# Patient Record
Sex: MALE | Race: WHITE | NOT HISPANIC OR LATINO | Employment: OTHER | URBAN - METROPOLITAN AREA
[De-identification: names, ages, dates, MRNs, and addresses within clinical notes are randomized per-mention and may not be internally consistent; named-entity substitution may affect disease eponyms.]

---

## 2017-01-24 ENCOUNTER — OFFICE VISIT (OUTPATIENT)
Dept: FAMILY MEDICINE | Facility: CLINIC | Age: 67
End: 2017-01-24
Payer: COMMERCIAL

## 2017-01-24 VITALS
SYSTOLIC BLOOD PRESSURE: 135 MMHG | OXYGEN SATURATION: 98 % | HEART RATE: 73 BPM | BODY MASS INDEX: 30.52 KG/M2 | WEIGHT: 194.44 LBS | HEIGHT: 67 IN | DIASTOLIC BLOOD PRESSURE: 80 MMHG | TEMPERATURE: 99 F

## 2017-01-24 DIAGNOSIS — I10 ESSENTIAL HYPERTENSION: ICD-10-CM

## 2017-01-24 DIAGNOSIS — E11.42 DIABETIC POLYNEUROPATHY ASSOCIATED WITH TYPE 2 DIABETES MELLITUS: ICD-10-CM

## 2017-01-24 DIAGNOSIS — Z00.00 ROUTINE MEDICAL EXAM: Primary | ICD-10-CM

## 2017-01-24 DIAGNOSIS — Z12.11 SCREENING FOR COLORECTAL CANCER: ICD-10-CM

## 2017-01-24 DIAGNOSIS — E78.5 HYPERLIPIDEMIA, UNSPECIFIED HYPERLIPIDEMIA TYPE: ICD-10-CM

## 2017-01-24 DIAGNOSIS — Z12.12 SCREENING FOR COLORECTAL CANCER: ICD-10-CM

## 2017-01-24 DIAGNOSIS — Z79.4 LONG-TERM INSULIN USE: ICD-10-CM

## 2017-01-24 DIAGNOSIS — G47.00 INSOMNIA, UNSPECIFIED TYPE: ICD-10-CM

## 2017-01-24 DIAGNOSIS — Z12.5 SCREENING FOR PROSTATE CANCER: ICD-10-CM

## 2017-01-24 DIAGNOSIS — K21.9 GASTROESOPHAGEAL REFLUX DISEASE, ESOPHAGITIS PRESENCE NOT SPECIFIED: ICD-10-CM

## 2017-01-24 PROCEDURE — 3046F HEMOGLOBIN A1C LEVEL >9.0%: CPT | Mod: S$GLB,,, | Performed by: INTERNAL MEDICINE

## 2017-01-24 PROCEDURE — 99387 INIT PM E/M NEW PAT 65+ YRS: CPT | Mod: S$GLB,,, | Performed by: INTERNAL MEDICINE

## 2017-01-24 PROCEDURE — 99999 PR PBB SHADOW E&M-NEW PATIENT-LVL III: CPT | Mod: PBBFAC,,, | Performed by: INTERNAL MEDICINE

## 2017-01-24 PROCEDURE — 3079F DIAST BP 80-89 MM HG: CPT | Mod: S$GLB,,, | Performed by: INTERNAL MEDICINE

## 2017-01-24 PROCEDURE — 1126F AMNT PAIN NOTED NONE PRSNT: CPT | Mod: S$GLB,,, | Performed by: INTERNAL MEDICINE

## 2017-01-24 PROCEDURE — 4010F ACE/ARB THERAPY RXD/TAKEN: CPT | Mod: S$GLB,,, | Performed by: INTERNAL MEDICINE

## 2017-01-24 PROCEDURE — 2022F DILAT RTA XM EVC RTNOPTHY: CPT | Mod: S$GLB,,, | Performed by: INTERNAL MEDICINE

## 2017-01-24 PROCEDURE — 99214 OFFICE O/P EST MOD 30 MIN: CPT | Mod: 25,S$GLB,, | Performed by: INTERNAL MEDICINE

## 2017-01-24 PROCEDURE — 3075F SYST BP GE 130 - 139MM HG: CPT | Mod: S$GLB,,, | Performed by: INTERNAL MEDICINE

## 2017-01-24 PROCEDURE — 1157F ADVNC CARE PLAN IN RCRD: CPT | Mod: S$GLB,,, | Performed by: INTERNAL MEDICINE

## 2017-01-24 PROCEDURE — 1159F MED LIST DOCD IN RCRD: CPT | Mod: S$GLB,,, | Performed by: INTERNAL MEDICINE

## 2017-01-24 PROCEDURE — 1160F RVW MEDS BY RX/DR IN RCRD: CPT | Mod: S$GLB,,, | Performed by: INTERNAL MEDICINE

## 2017-01-24 RX ORDER — TIZANIDINE 4 MG/1
4 TABLET ORAL EVERY 6 HOURS PRN
COMMUNITY
End: 2017-09-27 | Stop reason: DRUGHIGH

## 2017-01-24 RX ORDER — BLOOD SUGAR DIAGNOSTIC
STRIP MISCELLANEOUS
COMMUNITY
Start: 2016-11-26 | End: 2017-12-05 | Stop reason: SDUPTHER

## 2017-01-24 RX ORDER — METOPROLOL SUCCINATE 25 MG/1
25 TABLET, EXTENDED RELEASE ORAL
COMMUNITY
Start: 2016-11-28 | End: 2017-11-01 | Stop reason: SDUPTHER

## 2017-01-24 RX ORDER — GABAPENTIN 100 MG/1
CAPSULE ORAL 2 TIMES DAILY
COMMUNITY
Start: 2016-11-30 | End: 2018-01-15 | Stop reason: SDUPTHER

## 2017-01-24 RX ORDER — ISOPROPYL ALCOHOL 0.75 G/1
SWAB TOPICAL
COMMUNITY
Start: 2016-12-28

## 2017-01-24 RX ORDER — INSULIN ASPART 100 [IU]/ML
INJECTION, SUSPENSION SUBCUTANEOUS
COMMUNITY
End: 2018-07-24

## 2017-01-24 RX ORDER — ASPIRIN 325 MG
81 TABLET ORAL EVERY MORNING
Status: ON HOLD | COMMUNITY
End: 2020-12-10 | Stop reason: HOSPADM

## 2017-01-24 RX ORDER — METFORMIN HYDROCHLORIDE 500 MG/1
1000 TABLET, EXTENDED RELEASE ORAL 2 TIMES DAILY WITH MEALS
COMMUNITY
Start: 2016-12-26 | End: 2017-12-04 | Stop reason: SDUPTHER

## 2017-01-24 RX ORDER — TRIAZOLAM 0.25 MG/1
TABLET ORAL
Refills: 5 | COMMUNITY
Start: 2016-11-29 | End: 2019-08-06 | Stop reason: SDUPTHER

## 2017-01-24 RX ORDER — PEN NEEDLE, DIABETIC 31 GX5/16"
NEEDLE, DISPOSABLE MISCELLANEOUS
COMMUNITY
Start: 2016-11-18 | End: 2019-07-31

## 2017-01-24 RX ORDER — OMEPRAZOLE 20 MG/1
20 CAPSULE, DELAYED RELEASE ORAL
COMMUNITY
Start: 2016-11-28 | End: 2018-03-14 | Stop reason: SDUPTHER

## 2017-01-24 RX ORDER — FENOFIBRATE 160 MG/1
160 TABLET ORAL EVERY MORNING
COMMUNITY
Start: 2017-01-17 | End: 2017-11-01 | Stop reason: SDUPTHER

## 2017-01-24 RX ORDER — LIRAGLUTIDE 6 MG/ML
INJECTION SUBCUTANEOUS
Refills: 4 | COMMUNITY
Start: 2017-01-02 | End: 2017-05-08

## 2017-01-24 RX ORDER — INSULIN GLARGINE 100 [IU]/ML
13 INJECTION, SOLUTION SUBCUTANEOUS EVERY MORNING
COMMUNITY
Start: 2016-11-28 | End: 2017-10-18 | Stop reason: SDUPTHER

## 2017-01-24 RX ORDER — BLOOD GLUCOSE CONTROL HIGH,LOW
EACH MISCELLANEOUS
COMMUNITY
Start: 2016-12-03 | End: 2023-05-29

## 2017-01-24 RX ORDER — LANCETS
EACH MISCELLANEOUS
COMMUNITY
Start: 2016-11-22 | End: 2023-05-29

## 2017-01-24 RX ORDER — LISINOPRIL 40 MG/1
40 TABLET ORAL
COMMUNITY
Start: 2016-12-26 | End: 2017-12-12 | Stop reason: SDUPTHER

## 2017-01-24 RX ORDER — ATORVASTATIN CALCIUM 40 MG/1
40 TABLET, FILM COATED ORAL EVERY MORNING
COMMUNITY
Start: 2016-12-26 | End: 2018-08-21 | Stop reason: SDUPTHER

## 2017-01-24 NOTE — PROGRESS NOTES
Chief complaint: New patient physical    66-year-old white male new to the clinic.  Here with his wife.  His primary care was at [facility] but he now has a [employer] product and he needs to stay within the [facility] system.  He's exercising in the gym 4 times per week.  He reports having said some labs back in October which were unremarkable and possibly included a PSA, but he can't be sure.  He has not yet had a colonoscopy and we discussed the importance.      ROS:   CONST: weight stable. EYES: no vision change. ENT: no sore throat. CV: no chest pain w/ exertion. RESP: no shortness of breath. GI: no nausea, vomiting, diarrhea. No dysphagia. : no urinary issues. MUSCULOSKELETAL: no new myalgias or arthralgias. SKIN: no new changes. NEURO: no focal deficits. PSYCH: no new issues. ENDOCRINE: no polyuria. HEME: no lymph nodes. ALLERGY: no general pruritis.    Past medical history:  1.  Uncontrolled diabetes with neuropathy, followed by Dr. Agrawal  2.  Coronary artery disease with triple bypass 2016, followed by the heart clinic.  3.  Back surgery .  5.  Hyperlipidemia.  6.  Hypertension.  7.  Never had colonoscopy    Family history: Father  at 77 with stroke.  Mother  at 72 with heart problems, diabetes, hypertension.  2 sisters living in their 80s and one  at 82.  One brother  at 80 with some kidney disease and diabetes.  Sisters with diabetes, hypertension and stroke.  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  He drinks alcohol about 3 times.  Never smoked.          Gen: no distress  EYES: conjunctiva clear, non-icteric, PERRL  ENT: nose clear, nasal mucosa normal, oropharynx clear and moist, teeth good  NECK:supple, thyroid non-palpable  RESP: effort is good, lungs clear  CV: heart RRR w/o murmur, gallops or rubs; no carotid bruits, no edema  GI: abdomen soft, non-distended,  non-tender, no hepatosplenomegaly  MS: gait normal, no clubbing or cyanosis of the digits  SKIN: no rashes, warm to touch    Driss was seen today for establish care.    Diagnoses and all orders for this visit:    Routine medical exam, new to the clinic, discussed importance and procedure for colonoscopy and he is willing.  We'll present him.  He has not had a prostate cancer screening and will order with his new insurance along with other screening lab work.  Continue to exercise.  -     Prostate Specific Antigen, Diagnostic; Future    Screening for prostate cancer  -     Prostate Specific Antigen, Diagnostic; Future    Screening for colorectal cancer  -     Case request GI: COLONOSCOPY                                                    Additional evaluation and management issues:    Additionally, patient has other medical conditions to address today separate from his physical.  He does have diabetes which is uncontrolled and sometimes in the 6 range but only recently.  He is followed by Dr. Agrawal in endocrine.  He is not aware of any kidney problems, although did have some kidney problems as expected during his bypass.  He was not referred to nephrology.  He apparently does have neuropathic symptoms in his feet, which required gabapentin..  He is on insulin.  We will send lab work and urine studies to assess for kidney damage and nephropathy.  He is on an ACE inhibitor.      Regarding hypertension it appears to be under good control.  He is on statin and we will reassess his lipid status.    He is on PPI which as well as controlled.  His reflux, which was a nightly problem previously.      He apparently is on some Halcion for insomnia, but did not request a refill today.  We discussed that he can have his pharmacy switch his PCP and send any needed refills to me.  There are some discrepancies with the listed types of insulin.  He is on.  I will defer insulin management to his endocrinologist.  All these issues  "reviewed and patient counseled in the presence of his wife an evaluation and management will be based upon time counselingTotal time over 25 minutes with over 50% counseling.          Assessment and plan:    Uncontrolled type 2 diabetes mellitus without complication, with long-term current use of insulin, assess for diabetic-related endorgan damage.  Patient will need to check if he has had pneumonia vaccination  -     Microalbumin/creatinine urine ratio; Future  -     TSH; Future  -     Lipid panel; Future  -     CBC auto differential; Future  -     Hemoglobin A1c; Future  -     Comprehensive metabolic panel; Future    Essential hypertension, appears chronic and stable and on ACE inhibitor  -     Lipid panel; Future    Hyperlipidemia, unspecified hyperlipidemia type, reassess on 2 medicines  -     Lipid panel; Future    Long-term insulin use, defer management to endocrine    Gastroesophageal reflux disease, esophagitis presence not specified, controlled with PPI    Insomnia, unspecified type, apparently using medications    Diabetes mellitus with neurological manifestations, uncontrolled, symptoms controlled with medications    Diabetic polyneuropathy associated with type 2 diabetes mellitus     Clinical note sensitive as patient is new to the clinic and do not want any discrepancies in the history cause any problems at this point"This note will not be shared with the patient."    "

## 2017-01-28 ENCOUNTER — LAB VISIT (OUTPATIENT)
Dept: LAB | Facility: HOSPITAL | Age: 67
End: 2017-01-28
Attending: INTERNAL MEDICINE
Payer: COMMERCIAL

## 2017-01-28 DIAGNOSIS — Z12.5 SCREENING FOR PROSTATE CANCER: ICD-10-CM

## 2017-01-28 DIAGNOSIS — I10 ESSENTIAL HYPERTENSION: ICD-10-CM

## 2017-01-28 DIAGNOSIS — Z00.00 ROUTINE MEDICAL EXAM: ICD-10-CM

## 2017-01-28 DIAGNOSIS — E78.5 HYPERLIPIDEMIA, UNSPECIFIED HYPERLIPIDEMIA TYPE: ICD-10-CM

## 2017-01-28 LAB
ALBUMIN SERPL BCP-MCNC: 4 G/DL
ALP SERPL-CCNC: 37 U/L
ALT SERPL W/O P-5'-P-CCNC: 25 U/L
ANION GAP SERPL CALC-SCNC: 10 MMOL/L
AST SERPL-CCNC: 22 U/L
BASOPHILS # BLD AUTO: 0.03 K/UL
BASOPHILS NFR BLD: 0.5 %
BILIRUB SERPL-MCNC: 0.3 MG/DL
BUN SERPL-MCNC: 27 MG/DL
CALCIUM SERPL-MCNC: 10.1 MG/DL
CHLORIDE SERPL-SCNC: 108 MMOL/L
CHOLEST/HDLC SERPL: 5.3 {RATIO}
CO2 SERPL-SCNC: 22 MMOL/L
COMPLEXED PSA SERPL-MCNC: 0.97 NG/ML
CREAT SERPL-MCNC: 1.3 MG/DL
DIFFERENTIAL METHOD: ABNORMAL
EOSINOPHIL # BLD AUTO: 0.1 K/UL
EOSINOPHIL NFR BLD: 1.4 %
ERYTHROCYTE [DISTWIDTH] IN BLOOD BY AUTOMATED COUNT: 15.7 %
EST. GFR  (AFRICAN AMERICAN): >60 ML/MIN/1.73 M^2
EST. GFR  (NON AFRICAN AMERICAN): 56.5 ML/MIN/1.73 M^2
GLUCOSE SERPL-MCNC: 155 MG/DL
HCT VFR BLD AUTO: 41.9 %
HDL/CHOLESTEROL RATIO: 18.8 %
HDLC SERPL-MCNC: 154 MG/DL
HDLC SERPL-MCNC: 29 MG/DL
HGB BLD-MCNC: 13.1 G/DL
LDLC SERPL CALC-MCNC: 76.6 MG/DL
LYMPHOCYTES # BLD AUTO: 2.2 K/UL
LYMPHOCYTES NFR BLD: 37.2 %
MCH RBC QN AUTO: 25.1 PG
MCHC RBC AUTO-ENTMCNC: 31.3 %
MCV RBC AUTO: 80 FL
MONOCYTES # BLD AUTO: 0.5 K/UL
MONOCYTES NFR BLD: 8 %
NEUTROPHILS # BLD AUTO: 3.1 K/UL
NEUTROPHILS NFR BLD: 52.7 %
NONHDLC SERPL-MCNC: 125 MG/DL
PLATELET # BLD AUTO: 203 K/UL
PMV BLD AUTO: 10.3 FL
POTASSIUM SERPL-SCNC: 5.9 MMOL/L
PROT SERPL-MCNC: 7.4 G/DL
RBC # BLD AUTO: 5.21 M/UL
SODIUM SERPL-SCNC: 140 MMOL/L
TRIGL SERPL-MCNC: 242 MG/DL
TSH SERPL DL<=0.005 MIU/L-ACNC: 3.49 UIU/ML
WBC # BLD AUTO: 5.86 K/UL

## 2017-01-28 PROCEDURE — 84153 ASSAY OF PSA TOTAL: CPT

## 2017-01-28 PROCEDURE — 85025 COMPLETE CBC W/AUTO DIFF WBC: CPT

## 2017-01-28 PROCEDURE — 84443 ASSAY THYROID STIM HORMONE: CPT

## 2017-01-28 PROCEDURE — 80061 LIPID PANEL: CPT

## 2017-01-28 PROCEDURE — 36415 COLL VENOUS BLD VENIPUNCTURE: CPT | Mod: PO

## 2017-01-28 PROCEDURE — 80053 COMPREHEN METABOLIC PANEL: CPT

## 2017-01-28 PROCEDURE — 83036 HEMOGLOBIN GLYCOSYLATED A1C: CPT

## 2017-01-30 LAB
ESTIMATED AVG GLUCOSE: 177 MG/DL
HBA1C MFR BLD HPLC: 7.8 %

## 2017-02-14 ENCOUNTER — LAB VISIT (OUTPATIENT)
Dept: LAB | Facility: HOSPITAL | Age: 67
End: 2017-02-14
Attending: INTERNAL MEDICINE
Payer: MEDICARE

## 2017-02-14 DIAGNOSIS — R94.4 ABNORMAL RENAL FUNCTION TEST: Primary | ICD-10-CM

## 2017-02-14 DIAGNOSIS — E87.5 HYPERKALEMIA: ICD-10-CM

## 2017-02-14 LAB
ALBUMIN SERPL BCP-MCNC: 4 G/DL
ALP SERPL-CCNC: 32 U/L
ALT SERPL W/O P-5'-P-CCNC: 25 U/L
ANION GAP SERPL CALC-SCNC: 7 MMOL/L
AST SERPL-CCNC: 20 U/L
BILIRUB SERPL-MCNC: 0.4 MG/DL
BUN SERPL-MCNC: 31 MG/DL
CALCIUM SERPL-MCNC: 9.4 MG/DL
CHLORIDE SERPL-SCNC: 107 MMOL/L
CO2 SERPL-SCNC: 23 MMOL/L
CREAT SERPL-MCNC: 1.4 MG/DL
EST. GFR  (AFRICAN AMERICAN): 59.7 ML/MIN/1.73 M^2
EST. GFR  (NON AFRICAN AMERICAN): 51.6 ML/MIN/1.73 M^2
GLUCOSE SERPL-MCNC: 124 MG/DL
POTASSIUM SERPL-SCNC: 5.7 MMOL/L
PROT SERPL-MCNC: 7.2 G/DL
SODIUM SERPL-SCNC: 137 MMOL/L

## 2017-02-14 PROCEDURE — 80053 COMPREHEN METABOLIC PANEL: CPT

## 2017-02-14 PROCEDURE — 36415 COLL VENOUS BLD VENIPUNCTURE: CPT | Mod: PO

## 2017-02-21 ENCOUNTER — SURGERY (OUTPATIENT)
Age: 67
End: 2017-02-21

## 2017-02-21 ENCOUNTER — HOSPITAL ENCOUNTER (OUTPATIENT)
Facility: HOSPITAL | Age: 67
Discharge: HOME | End: 2017-02-21
Attending: INTERNAL MEDICINE | Admitting: INTERNAL MEDICINE
Payer: MEDICARE

## 2017-02-21 ENCOUNTER — ANESTHESIA (OUTPATIENT)
Dept: ENDOSCOPY | Facility: HOSPITAL | Age: 67
End: 2017-02-21
Payer: MEDICARE

## 2017-02-21 ENCOUNTER — ANESTHESIA EVENT (OUTPATIENT)
Dept: ENDOSCOPY | Facility: HOSPITAL | Age: 67
End: 2017-02-21
Payer: MEDICARE

## 2017-02-21 VITALS
HEART RATE: 62 BPM | RESPIRATION RATE: 18 BRPM | OXYGEN SATURATION: 98 % | TEMPERATURE: 98 F | DIASTOLIC BLOOD PRESSURE: 77 MMHG | SYSTOLIC BLOOD PRESSURE: 153 MMHG

## 2017-02-21 DIAGNOSIS — Z13.9 SCREENING: ICD-10-CM

## 2017-02-21 LAB — POCT GLUCOSE: 98 MG/DL (ref 70–110)

## 2017-02-21 PROCEDURE — D9220A PRA ANESTHESIA: Mod: PT,CRNA,, | Performed by: NURSE ANESTHETIST, CERTIFIED REGISTERED

## 2017-02-21 PROCEDURE — 88305 TISSUE EXAM BY PATHOLOGIST: CPT | Mod: 26,,,

## 2017-02-21 PROCEDURE — D9220A PRA ANESTHESIA: Mod: PT,ANES,, | Performed by: ANESTHESIOLOGY

## 2017-02-21 PROCEDURE — 45385 COLONOSCOPY W/LESION REMOVAL: CPT | Performed by: INTERNAL MEDICINE

## 2017-02-21 PROCEDURE — 27201089 HC SNARE, DISP (ANY): Performed by: INTERNAL MEDICINE

## 2017-02-21 PROCEDURE — 88305 TISSUE EXAM BY PATHOLOGIST: CPT

## 2017-02-21 PROCEDURE — 82962 GLUCOSE BLOOD TEST: CPT | Performed by: INTERNAL MEDICINE

## 2017-02-21 PROCEDURE — 37000009 HC ANESTHESIA EA ADD 15 MINS: Performed by: INTERNAL MEDICINE

## 2017-02-21 PROCEDURE — 63600175 PHARM REV CODE 636 W HCPCS

## 2017-02-21 PROCEDURE — 25000003 PHARM REV CODE 250

## 2017-02-21 PROCEDURE — 25000003 PHARM REV CODE 250: Performed by: INTERNAL MEDICINE

## 2017-02-21 PROCEDURE — 37000008 HC ANESTHESIA 1ST 15 MINUTES: Performed by: INTERNAL MEDICINE

## 2017-02-21 RX ORDER — SODIUM CHLORIDE 9 MG/ML
INJECTION, SOLUTION INTRAVENOUS CONTINUOUS
Status: DISCONTINUED | OUTPATIENT
Start: 2017-02-21 | End: 2017-02-21 | Stop reason: HOSPADM

## 2017-02-21 RX ORDER — LIDOCAINE HYDROCHLORIDE 20 MG/ML
INJECTION, SOLUTION INFILTRATION; PERINEURAL
Status: COMPLETED
Start: 2017-02-21 | End: 2017-02-21

## 2017-02-21 RX ORDER — PROPOFOL 10 MG/ML
VIAL (ML) INTRAVENOUS
Status: COMPLETED
Start: 2017-02-21 | End: 2017-02-21

## 2017-02-21 RX ADMIN — PROPOFOL 50 MG: 10 INJECTION, EMULSION INTRAVENOUS at 09:02

## 2017-02-21 RX ADMIN — SODIUM CHLORIDE 1000 ML: 0.9 INJECTION, SOLUTION INTRAVENOUS at 08:02

## 2017-02-21 RX ADMIN — LIDOCAINE HYDROCHLORIDE 5 ML: 20 INJECTION, SOLUTION INFILTRATION; PERINEURAL at 09:02

## 2017-02-21 RX ADMIN — PROPOFOL 100 MG: 10 INJECTION, EMULSION INTRAVENOUS at 09:02

## 2017-02-21 NOTE — TRANSFER OF CARE
Anesthesia Transfer of Care Note    Patient: Driss Hernandez Jr.    Procedure(s) Performed: Procedure(s) (LRB):  COLONOSCOPY (N/A)    Patient location: PACU    Anesthesia Type: general    Transport from OR: Transported from OR on room air with adequate spontaneous ventilation    Post pain: adequate analgesia    Post assessment: no apparent anesthetic complications and tolerated procedure well    Post vital signs: stable    Level of consciousness: awake, alert and oriented    Nausea/Vomiting: no nausea/vomiting    Complications: none          Last vitals:   Visit Vitals    /70    Pulse 62    Temp 36.7 °C (98.1 °F) (Oral)    Resp 14    SpO2 98%

## 2017-02-21 NOTE — INTERVAL H&P NOTE
The patient has been examined and the H&P has been reviewed:    I concur with the findings and no changes have occurred since H&P was written.    Anesthesia/Surgery risks, benefits and alternative options discussed and understood by patient/family.          Active Hospital Problems    Diagnosis  POA    Screening [Z13.9]  Not Applicable      Resolved Hospital Problems    Diagnosis Date Resolved POA   No resolved problems to display.

## 2017-02-21 NOTE — ANESTHESIA PREPROCEDURE EVALUATION
02/21/2017  Driss Hernandez Jr. is a 66 y.o., male.    [facility] Anesthesia Evaluation    I have reviewed the Patient Summary Reports.     I have reviewed the Medications.     Review of Systems  Anesthesia Hx:  Neg history of prior surgery.   Social:  Non-Smoker    Hematology/Oncology:         -- Anemia:   Cardiovascular:   Hypertension hyperlipidemia    Pulmonary:  Pulmonary Normal    Renal/:   Chronic Renal Disease, CRI    Hepatic/GI:   Bowel Prep. GERD    Neurological:   Peripheral Neuropathy    Endocrine:   Diabetes, poorly controlled        Physical Exam  General:  Obesity    Airway/Jaw/Neck:  Airway Findings: Mouth Opening: Normal Tongue: Normal  General Airway Assessment: Adult  Mallampati: II  TM Distance: 4 - 6 cm  Jaw/Neck Findings:  Neck ROM: Normal ROM      Dental:  Dental Findings: In tact   Chest/Lungs:  Chest/Lungs Findings: Normal Respiratory Rate     Heart/Vascular:  Heart Findings: Rate: Normal        Mental Status:  Mental Status Findings:  Cooperative         Anesthesia Plan  Type of Anesthesia, risks & benefits discussed:  Anesthesia Type:  general  Patient's Preference:   Intra-op Monitoring Plan: standard ASA monitors  Intra-op Monitoring Plan Comments:   Post Op Pain Control Plan:   Post Op Pain Control Plan Comments:   Induction:   IV  Beta Blocker:  Patient is on a Beta-Blocker and has received one dose within the past 24 hours (No further documentation required).       Informed Consent: Patient understands risks and agrees with Anesthesia plan.  Questions answered. Anesthesia consent signed with patient.  ASA Score: 3     Day of Surgery Review of History & Physical:    H&P update referred to the provider.         Ready For Surgery From Anesthesia Perspective.

## 2017-02-21 NOTE — IP AVS SNAPSHOT
[place] [address] [address] [place] [address]  Phone: [phone]           Patient Discharge Instructions     Our goal is to set you up for success. This packet includes information on your condition, medications, and your home care. It will help you to care for yourself so you don't get sicker and need to go back to the hospital.     Please ask your nurse if you have any questions.        There are many details to remember when preparing to leave the hospital. Here is what you will need to do:    1. Take your medicine. If you are prescribed medications, review your Medication List in the following pages. You may have new medications to  at the pharmacy and others that you'll need to stop taking. Review the instructions for how and when to take your medications. Talk with your doctor or nurses if you are unsure of what to do.     2. Go to your follow-up appointments. Specific follow-up information is listed in the following pages. Your may be contacted by a transition nurse or clinical provider about future appointments. Be sure we have all of the phone numbers to reach you, if needed. Please contact your provider's office if you are unable to make an appointment.     3. Watch for warning signs. Your doctor or nurse will give you detailed warning signs to watch for and when to call for assistance. These instructions may also include educational information about your condition. If you experience any of warning signs to your health, call your doctor.               ** Verify the list of medication(s) below is accurate and up to date. Carry this with you in case of emergency. If your medications have changed, please notify your healthcare provider.             Medication List      ASK your doctor about these medications        Additional Info                      ACCU-CHEK MOIZ CONTROL SOLN Soln   Refills:  0   Generic drug:  blood glucose control high,low      Begin Date    AM     "Noon    PM    Bedtime       ACCU-CHEK MOIZ PLUS TEST STRP Strp   Refills:  0   Generic drug:  blood sugar diagnostic      Begin Date    AM    Noon    PM    Bedtime       ACCU-CHEK SOFTCLIX LANCETS Misc   Refills:  0   Generic drug:  lancets      Begin Date    AM    Noon    PM    Bedtime       aspirin 325 MG tablet   Refills:  0   Dose:  325 mg    Instructions:  Take 325 mg by mouth once daily.     Begin Date    AM    Noon    PM    Bedtime       atorvastatin 40 MG tablet   Commonly known as:  LIPITOR   Refills:  0   Dose:  40 mg    Instructions:  Take 40 mg by mouth once daily.     Begin Date    AM    Noon    PM    Bedtime       BD ALCOHOL SWABS Padm   Refills:  0   Generic drug:  alcohol swabs      Begin Date    AM    Noon    PM    Bedtime       BD INSULIN PEN NEEDLE UF MINI 31 gauge x 3/16" Ndle   Refills:  0   Generic drug:  pen needle, diabetic      Begin Date    AM    Noon    PM    Bedtime       fenofibrate 160 MG Tab   Refills:  0   Dose:  160 mg    Instructions:  Take 160 mg by mouth once daily.     Begin Date    AM    Noon    PM    Bedtime       gabapentin 100 MG capsule   Commonly known as:  NEURONTIN   Refills:  0   Dose:  100 mg    Instructions:  Take 100 mg by mouth 2 (two) times daily.     Begin Date    AM    Noon    PM    Bedtime       insulin aspart protamine-insulin aspart 100 unit/mL (70-30) Inpn pen   Commonly known as:  NovoLOG 70/30   Refills:  0    Instructions:  Inject into the skin 2 (two) times daily before meals.     Begin Date    AM    Noon    PM    Bedtime       LANTUS SOLOSTAR 100 unit/mL (3 mL) Inpn pen   Refills:  0   Generic drug:  insulin glargine      Begin Date    AM    Noon    PM    Bedtime       lisinopril 40 MG tablet   Commonly known as:  PRINIVIL,ZESTRIL   Refills:  0   Dose:  40 mg    Instructions:  Take 40 mg by mouth once daily.     Begin Date    AM    Noon    PM    Bedtime       metformin 500 MG 24 hr tablet   Commonly known as:  GLUCOPHAGE-XR   Refills:  0   Dose:  500 mg "    Instructions:  Take 500 mg by mouth 2 (two) times daily with meals.     Begin Date    AM    Noon    PM    Bedtime       metoprolol succinate 25 MG 24 hr tablet   Commonly known as:  TOPROL-XL   Refills:  0    Instructions:  Take by mouth once daily.     Begin Date    AM    Noon    PM    Bedtime       omeprazole 20 MG capsule   Commonly known as:  PRILOSEC   Refills:  0   Dose:  20 mg    Instructions:  Take 20 mg by mouth once daily.     Begin Date    AM    Noon    PM    Bedtime       polyethylene glycol 240-22.72-6.72 -5.84 gram Solr   Commonly known as:  COLYTE   Quantity:  1 Bottle   Refills:  0   Dose:  4 L    Instructions:  Take 4,000 mLs (4 L total) by mouth as needed.     Begin Date    AM    Noon    PM    Bedtime       tizanidine 4 MG tablet   Commonly known as:  ZANAFLEX   Refills:  0   Dose:  4 mg    Instructions:  Take 4 mg by mouth every 6 (six) hours as needed.     Begin Date    AM    Noon    PM    Bedtime       triazolam 0.25 MG Tab   Commonly known as:  HALCION   Refills:  5    Instructions:  TK 1 T PO QHS     Begin Date    AM    Noon    PM    Bedtime       VICTOZA 2-RICHARD 0.6 mg/0.1 mL (18 mg/3 mL) Pnij   Refills:  4   Generic drug:  liraglutide 0.6 mg/0.1 mL (18 mg/3 mL) subq PNIJ    Instructions:  INJECT 1.8 MG UNDER THE SKIN QD     Begin Date    AM    Noon    PM    Bedtime                  Please bring to all follow up appointments:    1. A copy of your discharge instructions.  2. All medicines you are currently taking in their original bottles.  3. Identification and insurance card.    Please arrive 15 minutes ahead of scheduled appointment time.    Please call 24 hours in advance if you must reschedule your appointment and/or time.          Discharge References/Attachments     COLONOSCOPY  (ENGLISH)        Admission Information     Date & Time Provider Department CSN    2/21/2017  7:53 AM Robb Rosado MD [facility] [id number]      Care Providers     Provider Role Specialty  Primary office phone    Robb Rosado MD Attending Provider Gastroenterology [phone]    Robb Rosado MD Surgeon  Gastroenterology [phone]      Your Vitals Were     BP Pulse Temp SpO2          167/75 (BP Location: Right arm, Patient Position: Lying, BP Method: Manual) 65 98.6 °F (37 °C) (Oral) 100%        Recent Lab Values        1/28/2017                           8:16 AM           A1C 7.8 (H)           Comment for A1C at  8:16 AM on 1/28/2017:  According to ADA guidelines, hemoglobin A1C <7.0% represents  optimal control in non-pregnant diabetic patients.  Different  metrics may apply to specific populations.   Standards of Medical Care in Diabetes - 2016.  For the purpose of screening for the presence of diabetes:  <5.7%     Consistent with the absence of diabetes  5.7-6.4%  Consistent with increasing risk for diabetes   (prediabetes)  >or=6.5%  Consistent with diabetes  Currently no consensus exists for use of hemoglobin A1C  for diagnosis of diabetes for children.        Pending Labs     Order Current Status    Specimen to Pathology - Surgery Collected (02/21/17 0952)      Allergies as of 2/21/2017        Reactions    Penicillins       [facility]     [facility] Nurse Care Line - 24/7 Assistance  Unless otherwise directed by your provider, please contact [facility], our nurse care line that is available for 24/7 assistance.     Registered nurses in the [facility] provide clinical advisement, health education, appointment booking, and other advisory services.  Call for this free service at [phone].        Advance Directives     An advance directive is a document which, in the event you are no longer able to make decisions for yourself, tells your healthcare team what kind of treatment you do or do not want to receive, or who you would like to make those decisions for you.  If you do not currently have an advance directive, [facility] encourages you to create one.   For more information call:  [phone]), [phone] ([phone]),  or log on to [email or web address].        Language Assistance Services     ATTENTION: Language assistance services are available, free of charge. Please call [phone].      ATENCIÓN: Si habla español, tiene a tello disposición servicios gratuitos de asistencia lingüística. Llame al [phone].     CHÚ Ý: N?u b?n nói Ti?ng Vi?t, có các d?ch v? h? tr? ngôn ng? mi?n phí dành cho b?n. G?i s? [phone].        Diabetes Discharge Instructions                                    [facility] complies with applicable Federal civil rights laws and does not discriminate on the basis of race, color, national origin, age, disability, or sex.

## 2017-02-21 NOTE — ANESTHESIA POSTPROCEDURE EVALUATION
Anesthesia Post Evaluation    Patient: Driss Hernandez Jr.    Procedure(s) Performed: Procedure(s) (LRB):  COLONOSCOPY (N/A)    Final Anesthesia Type: general  Patient location during evaluation: GI PACU  Patient participation: Yes- Able to Participate  Level of consciousness: awake  Post-procedure vital signs: reviewed and stable  Pain management: adequate  Airway patency: patent  PONV status at discharge: No PONV  Anesthetic complications: no      Cardiovascular status: stable  Respiratory status: unassisted  Hydration status: euvolemic  Follow-up not needed.        Visit Vitals    BP (!) 153/77 (BP Location: Left arm, Patient Position: Lying, BP Method: Automatic)    Pulse 62    Temp 36.7 °C (98.1 °F) (Oral)    Resp 18    SpO2 98%       Pain/Jose Maria Score: Pain Assessment Performed: Yes (2/21/2017  8:15 AM)  Presence of Pain: denies (2/21/2017 10:17 AM)  Jose Maria Score: 10 (2/21/2017 10:17 AM)  Modified Jose Maria Score: 20 (2/21/2017  8:15 AM)

## 2017-02-21 NOTE — H&P (VIEW-ONLY)
Chief complaint: New patient physical    66-year-old white male new to the clinic.  Here with his wife.  His primary care was at [facility] but he now has a [employer] product and he needs to stay within the [facility] system.  He's exercising in the gym 4 times per week.  He reports having said some labs back in October which were unremarkable and possibly included a PSA, but he can't be sure.  He has not yet had a colonoscopy and we discussed the importance.      ROS:   CONST: weight stable. EYES: no vision change. ENT: no sore throat. CV: no chest pain w/ exertion. RESP: no shortness of breath. GI: no nausea, vomiting, diarrhea. No dysphagia. : no urinary issues. MUSCULOSKELETAL: no new myalgias or arthralgias. SKIN: no new changes. NEURO: no focal deficits. PSYCH: no new issues. ENDOCRINE: no polyuria. HEME: no lymph nodes. ALLERGY: no general pruritis.    Past medical history:  1.  Uncontrolled diabetes with neuropathy, followed by Dr. Agrawal  2.  Coronary artery disease with triple bypass 2016, followed by the heart clinic.  3.  Back surgery .  5.  Hyperlipidemia.  6.  Hypertension.  7.  Never had colonoscopy    Family history: Father  at 77 with stroke.  Mother  at 72 with heart problems, diabetes, hypertension.  2 sisters living in their 80s and one  at 82.  One brother  at 80 with some kidney disease and diabetes.  Sisters with diabetes, hypertension and stroke.  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  He drinks alcohol about 3 times.  Never smoked.          Gen: no distress  EYES: conjunctiva clear, non-icteric, PERRL  ENT: nose clear, nasal mucosa normal, oropharynx clear and moist, teeth good  NECK:supple, thyroid non-palpable  RESP: effort is good, lungs clear  CV: heart RRR w/o murmur, gallops or rubs; no carotid bruits, no edema  GI: abdomen soft, non-distended,  non-tender, no hepatosplenomegaly  MS: gait normal, no clubbing or cyanosis of the digits  SKIN: no rashes, warm to touch    Driss was seen today for establish care.    Diagnoses and all orders for this visit:    Routine medical exam, new to the clinic, discussed importance and procedure for colonoscopy and he is willing.  We'll present him.  He has not had a prostate cancer screening and will order with his new insurance along with other screening lab work.  Continue to exercise.  -     Prostate Specific Antigen, Diagnostic; Future    Screening for prostate cancer  -     Prostate Specific Antigen, Diagnostic; Future    Screening for colorectal cancer  -     Case request GI: COLONOSCOPY                                                    Additional evaluation and management issues:    Additionally, patient has other medical conditions to address today separate from his physical.  He does have diabetes which is uncontrolled and sometimes in the 6 range but only recently.  He is followed by Dr. Agrawal in endocrine.  He is not aware of any kidney problems, although did have some kidney problems as expected during his bypass.  He was not referred to nephrology.  He apparently does have neuropathic symptoms in his feet, which required gabapentin..  He is on insulin.  We will send lab work and urine studies to assess for kidney damage and nephropathy.  He is on an ACE inhibitor.      Regarding hypertension it appears to be under good control.  He is on statin and we will reassess his lipid status.    He is on PPI which as well as controlled.  His reflux, which was a nightly problem previously.      He apparently is on some Halcion for insomnia, but did not request a refill today.  We discussed that he can have his pharmacy switch his PCP and send any needed refills to me.  There are some discrepancies with the listed types of insulin.  He is on.  I will defer insulin management to his endocrinologist.  All these issues  "reviewed and patient counseled in the presence of his wife an evaluation and management will be based upon time counselingTotal time over 25 minutes with over 50% counseling.          Assessment and plan:    Uncontrolled type 2 diabetes mellitus without complication, with long-term current use of insulin, assess for diabetic-related endorgan damage.  Patient will need to check if he has had pneumonia vaccination  -     Microalbumin/creatinine urine ratio; Future  -     TSH; Future  -     Lipid panel; Future  -     CBC auto differential; Future  -     Hemoglobin A1c; Future  -     Comprehensive metabolic panel; Future    Essential hypertension, appears chronic and stable and on ACE inhibitor  -     Lipid panel; Future    Hyperlipidemia, unspecified hyperlipidemia type, reassess on 2 medicines  -     Lipid panel; Future    Long-term insulin use, defer management to endocrine    Gastroesophageal reflux disease, esophagitis presence not specified, controlled with PPI    Insomnia, unspecified type, apparently using medications    Diabetes mellitus with neurological manifestations, uncontrolled, symptoms controlled with medications    Diabetic polyneuropathy associated with type 2 diabetes mellitus     Clinical note sensitive as patient is new to the clinic and do not want any discrepancies in the history cause any problems at this point"This note will not be shared with the patient."    "

## 2017-02-23 ENCOUNTER — TELEPHONE (OUTPATIENT)
Dept: FAMILY MEDICINE | Facility: CLINIC | Age: 67
End: 2017-02-23

## 2017-02-23 NOTE — TELEPHONE ENCOUNTER
----- Message from Penny Werner sent at 2/23/2017  9:24 AM CST -----  Contact: wife  Pt needs a referral for the endocrinology doctor. Please call [phone]. Thanks

## 2017-05-08 ENCOUNTER — OFFICE VISIT (OUTPATIENT)
Dept: FAMILY MEDICINE | Facility: CLINIC | Age: 67
End: 2017-05-08
Payer: MEDICARE

## 2017-05-08 VITALS
OXYGEN SATURATION: 98 % | WEIGHT: 195.56 LBS | HEIGHT: 67 IN | BODY MASS INDEX: 30.69 KG/M2 | HEART RATE: 78 BPM | SYSTOLIC BLOOD PRESSURE: 132 MMHG | DIASTOLIC BLOOD PRESSURE: 76 MMHG | TEMPERATURE: 99 F

## 2017-05-08 DIAGNOSIS — I25.10 CORONARY ARTERY DISEASE, ANGINA PRESENCE UNSPECIFIED, UNSPECIFIED VESSEL OR LESION TYPE, UNSPECIFIED WHETHER NATIVE OR TRANSPLANTED HEART: ICD-10-CM

## 2017-05-08 DIAGNOSIS — N52.1 TYPE II DIABETES CIRCULATORY DISORDER CAUSING ERECTILE DYSFUNCTION: ICD-10-CM

## 2017-05-08 DIAGNOSIS — R07.9 CHEST PAIN, UNSPECIFIED TYPE: ICD-10-CM

## 2017-05-08 DIAGNOSIS — E11.59 TYPE II DIABETES CIRCULATORY DISORDER CAUSING ERECTILE DYSFUNCTION: ICD-10-CM

## 2017-05-08 DIAGNOSIS — I10 ESSENTIAL HYPERTENSION: Primary | ICD-10-CM

## 2017-05-08 PROCEDURE — 99999 PR PBB SHADOW E&M-EST. PATIENT-LVL III: CPT | Mod: PBBFAC,,, | Performed by: INTERNAL MEDICINE

## 2017-05-08 PROCEDURE — 3075F SYST BP GE 130 - 139MM HG: CPT | Mod: S$GLB,,, | Performed by: INTERNAL MEDICINE

## 2017-05-08 PROCEDURE — 1159F MED LIST DOCD IN RCRD: CPT | Mod: S$GLB,,, | Performed by: INTERNAL MEDICINE

## 2017-05-08 PROCEDURE — 3078F DIAST BP <80 MM HG: CPT | Mod: S$GLB,,, | Performed by: INTERNAL MEDICINE

## 2017-05-08 PROCEDURE — 3045F PR MOST RECENT HEMOGLOBIN A1C LEVEL 7.0-9.0%: CPT | Mod: S$GLB,,, | Performed by: INTERNAL MEDICINE

## 2017-05-08 PROCEDURE — 4010F ACE/ARB THERAPY RXD/TAKEN: CPT | Mod: S$GLB,,, | Performed by: INTERNAL MEDICINE

## 2017-05-08 PROCEDURE — 1126F AMNT PAIN NOTED NONE PRSNT: CPT | Mod: S$GLB,,, | Performed by: INTERNAL MEDICINE

## 2017-05-08 PROCEDURE — 1160F RVW MEDS BY RX/DR IN RCRD: CPT | Mod: S$GLB,,, | Performed by: INTERNAL MEDICINE

## 2017-05-08 PROCEDURE — 99215 OFFICE O/P EST HI 40 MIN: CPT | Mod: S$GLB,,, | Performed by: INTERNAL MEDICINE

## 2017-05-08 PROCEDURE — 99499 UNLISTED E&M SERVICE: CPT | Mod: S$GLB,,, | Performed by: INTERNAL MEDICINE

## 2017-05-08 RX ORDER — NATEGLINIDE 120 MG/1
TABLET ORAL
COMMUNITY
Start: 2017-02-23 | End: 2017-05-08

## 2017-05-08 RX ORDER — SILDENAFIL CITRATE 20 MG/1
TABLET ORAL
Qty: 30 TABLET | Refills: 11 | Status: SHIPPED | OUTPATIENT
Start: 2017-05-08 | End: 2018-11-13 | Stop reason: SDUPTHER

## 2017-05-08 RX ORDER — NATEGLINIDE 60 MG/1
60 TABLET ORAL
COMMUNITY
End: 2018-07-24

## 2017-05-08 NOTE — PROGRESS NOTES
Chief complaint: Chest pain on and off    66-year-old white male seen by me for the second time.  He exercises vigorously 4 times per week.  He has had no chest pains or shortness of breath with exertion.  3 weeks ago or so he had 2 weeks of a sharp chest pain over the left chest.  It occurred while sitting.  It was more of a sharp pin sensation.  From his prior angina before his cardiac bypass.  His heart was racing once for less than a minute.  None was exertion related.  Also of his concern is about for the past 3 months he goes to the gym he checks his blood pressure but he does not sit and relax.  A pressure was averaging in the 150s.  Typically his blood pressures in the 100 dirty is as it is today.  We discussed continued monitoring as well as obtaining an appropriate reading after.  If rest prior to exercise.  His blood pressures always good after exercise.  He's taking lisinopril 40 mg and metoprolol 25.  We discussed that there could be adjustments, addition of Norvasc and so forth.  Another issue is his erectile dysfunction.  He had partial response to Viagra in the past but not to Cialis.  We did discuss cost which is an issue in the past.  We discussed the lesser expensive Viagra.    He does desire to establish with a new cardiologist.  His been years since he had a stress test although he did have his cardiac bypass in March 2016.  We discussed a referral to cardiology as well as possible good idea to have a cardiac stress testing, perfusion study since his cardiac bypass to at least serve as a baseline in case he does develop true angina.  Patient counseled at length regarding all these issues.Total time over 45 minutes with over 50% counseling.    He was also counseled regarding diabetic meds.  His injection was too expensive and he has discontinued.  He was given Starlix but was not given proper pronation to take it with meals and I explained this to him.  It appears she was given this new  medication as a replacement over the phone.  He has an A1c and follow-up with Dr. Agrawal's outside endocrinologist.  He continues on long and short acting insulin      ROS:   CONST: weight stable. EYES: no vision change. ENT: no sore throat. CV: no chest pain w/ exertion. RESP: no shortness of breath. GI: no nausea, vomiting, diarrhea. No dysphagia. : no urinary issues. MUSCULOSKELETAL: no new myalgias or arthralgias. SKIN: no new changes. NEURO: no focal deficits. PSYCH: no new issues. ENDOCRINE: no polyuria. HEME: no lymph nodes. ALLERGY: no general pruritis.    Past medical history:  1.  Uncontrolled diabetes with neuropathy, followed by Dr. Agrawal  2.  Coronary artery disease with triple bypass 2016, followed by the heart clinic.  3.  Back surgery .  5.  Hyperlipidemia.  6.  Hypertension.  7.  Never had colonoscopy  8.  Pneumovax and Prevnar given  according to records    Family history: Father  at 77 with stroke.  Mother  at 72 with heart problems, diabetes, hypertension.  2 sisters living in their 80s and one  at 82.  One brother  at 80 with some kidney disease and diabetes.  Sisters with diabetes, hypertension and stroke.  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  He drinks alcohol about 3 times.  Never smoked.          Gen: no distress  EYES: conjunctiva clear, non-icteric, PERRL  ENT: nose clear, nasal mucosa normal, oropharynx clear and moist, teeth good  NECK:supple, thyroid non-palpable  RESP: effort is good, lungs clear, some reproducible left anterior chest pain  CV: heart RRR w/o murmur, gallops or rubs; no carotid bruits, no edema  GI: abdomen soft, non-distended, non-tender, no hepatosplenomegaly  MS: gait normal, no clubbing or cyanosis of the digits  SKIN: no rashes, warm to touch      Vaccinations in the computer system, Della Naqvi was seen  "today for hypertension.    Diagnoses and all orders for this visit:    Essential hypertension, suspect patient having overall adequate control and we need to get more accurate readings before we make medications adjustment    Coronary artery disease, angina presence unspecified, unspecified vessel or lesion type, unspecified whether native or transplanted heart, needs referral to a new cardiologist  -     Ambulatory referral to Cardiology    Chest pain, unspecified type, nonanginal and patient reassured that if he has reproducible chest pain he can consider this nonanginal.  He exerts vigorously without symptoms of angina  -     Ambulatory referral to Cardiology    Type II diabetes circulatory disorder causing erectile dysfunction, still likely uncontrolled, recent medication adjustments explained, follow-up with endocrine, we will initiate low-dose Viagra    Other orders  -     sildenafil (REVATIO) 20 mg Tab; 1-5 pills at a time per day prn  -          Clinical note will be sensitive based on the sensitive issues discussed, erectile dysfunction and so forth"This note will not be shared with the patient."   Clinical note sensitive as patient is new to the clinic and do not want any discrepancies in the history cause any problems at this point"This note will not be shared with the patient."    "

## 2017-05-12 ENCOUNTER — TELEPHONE (OUTPATIENT)
Dept: FAMILY MEDICINE | Facility: CLINIC | Age: 67
End: 2017-05-12

## 2017-05-15 ENCOUNTER — LAB VISIT (OUTPATIENT)
Dept: LAB | Facility: HOSPITAL | Age: 67
End: 2017-05-15
Attending: INTERNAL MEDICINE
Payer: MEDICARE

## 2017-05-15 ENCOUNTER — TELEPHONE (OUTPATIENT)
Dept: FAMILY MEDICINE | Facility: CLINIC | Age: 67
End: 2017-05-15

## 2017-05-15 LAB
ALBUMIN SERPL BCP-MCNC: 3.8 G/DL
ALP SERPL-CCNC: 27 U/L
ALT SERPL W/O P-5'-P-CCNC: 23 U/L
ANION GAP SERPL CALC-SCNC: 13 MMOL/L
AST SERPL-CCNC: 23 U/L
BILIRUB SERPL-MCNC: 0.5 MG/DL
BUN SERPL-MCNC: 36 MG/DL
CALCIUM SERPL-MCNC: 9.6 MG/DL
CHLORIDE SERPL-SCNC: 110 MMOL/L
CHOLEST/HDLC SERPL: 5.2 {RATIO}
CO2 SERPL-SCNC: 17 MMOL/L
CREAT SERPL-MCNC: 1.4 MG/DL
EST. GFR  (AFRICAN AMERICAN): 59.7 ML/MIN/1.73 M^2
EST. GFR  (NON AFRICAN AMERICAN): 51.6 ML/MIN/1.73 M^2
GLUCOSE SERPL-MCNC: 138 MG/DL
HDL/CHOLESTEROL RATIO: 19.4 %
HDLC SERPL-MCNC: 160 MG/DL
HDLC SERPL-MCNC: 31 MG/DL
LDLC SERPL CALC-MCNC: 105.4 MG/DL
NONHDLC SERPL-MCNC: 129 MG/DL
POTASSIUM SERPL-SCNC: 4.9 MMOL/L
PROT SERPL-MCNC: 6.9 G/DL
SODIUM SERPL-SCNC: 140 MMOL/L
T4 FREE SERPL-MCNC: 0.89 NG/DL
TRIGL SERPL-MCNC: 118 MG/DL
TSH SERPL DL<=0.005 MIU/L-ACNC: 3.62 UIU/ML

## 2017-05-15 PROCEDURE — 36415 COLL VENOUS BLD VENIPUNCTURE: CPT | Mod: PO

## 2017-05-15 PROCEDURE — 80061 LIPID PANEL: CPT

## 2017-05-15 PROCEDURE — 84439 ASSAY OF FREE THYROXINE: CPT

## 2017-05-15 PROCEDURE — 80053 COMPREHEN METABOLIC PANEL: CPT

## 2017-05-15 PROCEDURE — 83036 HEMOGLOBIN GLYCOSYLATED A1C: CPT

## 2017-05-15 PROCEDURE — 84443 ASSAY THYROID STIM HORMONE: CPT

## 2017-05-15 NOTE — TELEPHONE ENCOUNTER
"Call pharmacy and advise early refill okay and stress to patient that the prescription says only 5 pills at a time    "Dr. GLASS NEVER said to take 10 pills, her maximum is 100 mg which is 5 pills and that's why the prescription was written that way"      Okay for pharmacy to dispense 60 if patient desires to purchase 60  "

## 2017-05-15 NOTE — TELEPHONE ENCOUNTER
Stated, he was told to take 10 pills a day until he gets results from medication (Revatio). Patient has ran out of medication and he still did not get any results from medication. Requesting more pills. Pharmacy need providers office to call for a early refill.

## 2017-05-15 NOTE — TELEPHONE ENCOUNTER
----- Message from Diego Suarez sent at 5/15/2017  1:32 PM CDT -----  Contact: Self  Pt called to request change on directions for sildenafil (REVATIO) 20 mg Tab. Pt states he was told by pharmacy that he had taken too many pills. Pt states he took medication as prescribed .Pt can be reached  @ [phone].

## 2017-05-16 LAB
ESTIMATED AVG GLUCOSE: 166 MG/DL
HBA1C MFR BLD HPLC: 7.4 %

## 2017-05-16 NOTE — TELEPHONE ENCOUNTER
----- Message from Munir Dee sent at 5/16/2017 10:56 AM CDT -----  Contact: Self/[phone]  Patient would like to speak to the staff regarding his medication refill. Thank you.

## 2017-06-07 ENCOUNTER — TELEPHONE (OUTPATIENT)
Dept: CARDIOLOGY | Facility: CLINIC | Age: 67
End: 2017-06-07

## 2017-06-07 ENCOUNTER — INITIAL CONSULT (OUTPATIENT)
Dept: CARDIOLOGY | Facility: CLINIC | Age: 67
End: 2017-06-07
Payer: MEDICARE

## 2017-06-07 VITALS
DIASTOLIC BLOOD PRESSURE: 73 MMHG | WEIGHT: 195 LBS | SYSTOLIC BLOOD PRESSURE: 139 MMHG | BODY MASS INDEX: 30.61 KG/M2 | HEART RATE: 66 BPM | OXYGEN SATURATION: 96 % | HEIGHT: 67 IN

## 2017-06-07 DIAGNOSIS — I25.10 CORONARY ARTERY DISEASE INVOLVING NATIVE CORONARY ARTERY OF NATIVE HEART WITHOUT ANGINA PECTORIS: Primary | ICD-10-CM

## 2017-06-07 DIAGNOSIS — I10 ESSENTIAL HYPERTENSION: ICD-10-CM

## 2017-06-07 DIAGNOSIS — E78.2 MIXED HYPERLIPIDEMIA: ICD-10-CM

## 2017-06-07 PROCEDURE — 3045F PR MOST RECENT HEMOGLOBIN A1C LEVEL 7.0-9.0%: CPT | Mod: S$GLB,,, | Performed by: INTERNAL MEDICINE

## 2017-06-07 PROCEDURE — 99999 PR PBB SHADOW E&M-EST. PATIENT-LVL III: CPT | Mod: PBBFAC,,, | Performed by: INTERNAL MEDICINE

## 2017-06-07 PROCEDURE — 99499 UNLISTED E&M SERVICE: CPT | Mod: S$GLB,,, | Performed by: INTERNAL MEDICINE

## 2017-06-07 PROCEDURE — 99204 OFFICE O/P NEW MOD 45 MIN: CPT | Mod: S$GLB,,, | Performed by: INTERNAL MEDICINE

## 2017-06-07 PROCEDURE — 1126F AMNT PAIN NOTED NONE PRSNT: CPT | Mod: S$GLB,,, | Performed by: INTERNAL MEDICINE

## 2017-06-07 PROCEDURE — 93000 ELECTROCARDIOGRAM COMPLETE: CPT | Mod: S$GLB,,, | Performed by: INTERNAL MEDICINE

## 2017-06-07 PROCEDURE — 4010F ACE/ARB THERAPY RXD/TAKEN: CPT | Mod: S$GLB,,, | Performed by: INTERNAL MEDICINE

## 2017-06-07 PROCEDURE — 1159F MED LIST DOCD IN RCRD: CPT | Mod: S$GLB,,, | Performed by: INTERNAL MEDICINE

## 2017-06-07 NOTE — LETTER
June 8, 2017      Jono Willoughby MD  [address] [place] [address]           [facility]  [address] [place] [address]  Phone: [phone]          Patient: Driss Hernandez Jr.   MR Number: [id number]   YOB: 1950   Date of Visit: 6/7/2017       Dear Dr. Jono Willoughby:    Thank you for referring Driss Hernandez to me for evaluation. Attached you will find relevant portions of my assessment and plan of care.    If you have questions, please do not hesitate to call me. I look forward to following Driss Hernandez along with you.    Sincerely,    Kelvin Hernandez MD    Enclosure  CC:  No Recipients    If you would like to receive this communication electronically, please contact [email or web address] or [phone] to request more information on [employer] Link access.    For providers and/or their staff who would like to refer a patient to [facility], please contact us through our one-stop-shop provider referral line, [facility], at [phone].    If you feel you have received this communication in error or would no longer like to receive these types of communications, please e-mail [email or web address]

## 2017-06-07 NOTE — TELEPHONE ENCOUNTER
Patient has a copy of his EKG he has some question. I  have already transmitted.  Please call him on his cell

## 2017-06-07 NOTE — TELEPHONE ENCOUNTER
----- Message from Meena Santos sent at 6/7/2017  2:59 PM CDT -----  Contact: [phone]  Pt is requesting a call back in regards to concerns he has about his test results Please call pt at your earliest convenience.  Thanks !

## 2017-06-07 NOTE — PROGRESS NOTES
Subjective:    Patient ID:  Driss Hernandez Jr. is a 66 y.o. male who presents for evaluation of No chief complaint on file.      HPI  Here to establish care for history of coronary artery disease.  He had CABG ×3 in 2016 [facility] for unstable angina.  He stated that he possibly could have had 6 bypasses and has other branch vessel disease it's on revascularize.  He's had on and off mild chest pain symptoms which were lesser in severity in comparison to when he had his bypass surgery.  They are the same quality.  Says he does have exertional symptoms.  He also gets some discomfort in his abdomen which she says may be related.  He denies any other associated symptoms.  He's express no PND, orthopnea or lower edema.  He denies any dizziness, presyncope or syncope.  His daughter is Kike who is the palliative nurse at [employer] [employer].    Review of Systems   Constitution: Negative.   HENT: Negative.    Eyes: Negative.    Cardiovascular: Positive for chest pain and dyspnea on exertion. Negative for irregular heartbeat, leg swelling, near-syncope, orthopnea, palpitations, paroxysmal nocturnal dyspnea and syncope.   Respiratory: Positive for shortness of breath.    Skin: Negative.    Musculoskeletal: Negative.    Gastrointestinal: Positive for abdominal pain and heartburn. Negative for constipation and diarrhea.   Genitourinary: Negative for dysuria.   Neurological: Negative for dizziness.   Psychiatric/Behavioral: Negative.      Past Medical History:   Diagnosis Date    Diabetes mellitus with neurological manifestations, uncontrolled 1/24/2017    Diabetic polyneuropathy associated with type 2 diabetes mellitus 1/24/2017    Essential hypertension 1/24/2017    Gastroesophageal reflux disease 1/24/2017    Hyperlipidemia 1/24/2017    Insomnia 1/24/2017    Long-term insulin use 1/24/2017    Uncontrolled type 2 diabetes mellitus without complication, with long-term current use of insulin 1/24/2017      Past Surgical History:   Procedure Laterality Date    COLONOSCOPY N/A 2/21/2017    Procedure: COLONOSCOPY;  Surgeon: Robb Rosado MD;  [facility];  Service: Endoscopy;  Laterality: N/A;     Social History   Substance Use Topics    Smoking status: Never Smoker    Smokeless tobacco: Not on file    Alcohol use No   History reviewed. No pertinent family history.     Objective:    Physical Exam   Constitutional: He is oriented to person, place, and time. He appears well-developed and well-nourished. No distress.   HENT:   Head: Normocephalic and atraumatic.   Eyes: Conjunctivae and EOM are normal. Pupils are equal, round, and reactive to light.   Neck: Normal range of motion. Neck supple. No thyromegaly present.   Cardiovascular: Normal rate, regular rhythm and normal heart sounds.    No murmur heard.  Pulmonary/Chest: Effort normal and breath sounds normal. No respiratory distress. He has no wheezes. He has no rales. He exhibits no tenderness.   Abdominal: Soft. Bowel sounds are normal.   Musculoskeletal: He exhibits no edema.   Neurological: He is alert and oriented to person, place, and time.   Skin: Skin is warm and dry.   Psychiatric: He has a normal mood and affect. His behavior is normal.       ekg sinus rhythm nonspecific ST-T changes    Assessment:       1. Coronary artery disease involving native coronary artery of native heart without angina pectoris    2. Essential hypertension    3. Mixed hyperlipidemia    4. Uncontrolled type 2 diabetes mellitus without complication, with long-term current use of insulin         Plan:       -Known coronary artery disease with on revascularized branch vessel disease post CABG  -With current symptoms will plan for PET stress for risk stratification  -Check echocardiogram and Holter here  -Continue risk factor modification i.e. diet next size tolerated  -Get records from the heart clinic    Return to clinic in one month with testing ASAP

## 2017-06-12 ENCOUNTER — HOSPITAL ENCOUNTER (OUTPATIENT)
Dept: CARDIOLOGY | Facility: HOSPITAL | Age: 67
Discharge: HOME | End: 2017-06-12
Attending: INTERNAL MEDICINE
Payer: MEDICARE

## 2017-06-12 DIAGNOSIS — I25.10 CORONARY ARTERY DISEASE INVOLVING NATIVE CORONARY ARTERY OF NATIVE HEART WITHOUT ANGINA PECTORIS: ICD-10-CM

## 2017-06-12 LAB
DIASTOLIC DYSFUNCTION: YES
GLOBAL PERICARDIAL EFFUSION: ABNORMAL
MITRAL VALVE MOBILITY: NORMAL
MITRAL VALVE REGURGITATION: ABNORMAL
RETIRED EF AND QEF - SEE NOTES: 60 (ref 55–65)

## 2017-06-12 PROCEDURE — 93227 XTRNL ECG REC<48 HR R&I: CPT | Mod: ,,, | Performed by: INTERNAL MEDICINE

## 2017-06-12 PROCEDURE — 93226 XTRNL ECG REC<48 HR SCAN A/R: CPT

## 2017-06-12 PROCEDURE — 93306 TTE W/DOPPLER COMPLETE: CPT | Mod: 26,,, | Performed by: INTERNAL MEDICINE

## 2017-06-12 PROCEDURE — 93306 TTE W/DOPPLER COMPLETE: CPT

## 2017-06-19 ENCOUNTER — TELEPHONE (OUTPATIENT)
Dept: CARDIOLOGY | Facility: CLINIC | Age: 67
End: 2017-06-19

## 2017-06-19 ENCOUNTER — TELEPHONE (OUTPATIENT)
Dept: FAMILY MEDICINE | Facility: CLINIC | Age: 67
End: 2017-06-19

## 2017-06-19 NOTE — TELEPHONE ENCOUNTER
----- Message from Zakiya Varela sent at 6/19/2017  3:24 PM CDT -----  Contact: Self   Patient says he need his results. Please call at [phone].

## 2017-06-19 NOTE — TELEPHONE ENCOUNTER
----- Message from Ale Hernandez sent at 6/19/2017  1:24 PM CDT -----  Contact: Wife - ada  Pt wife calling to speak to nurse regarding pt health. Please call [phone]

## 2017-06-19 NOTE — TELEPHONE ENCOUNTER
Stated, called Dr Hernandez's office not Dr Willoughby. Caller very upset that call went to wrong doctor. Transferred call to  to transferred to correct location. Manager notified.

## 2017-07-03 ENCOUNTER — CLINICAL SUPPORT (OUTPATIENT)
Dept: CARDIOLOGY | Facility: CLINIC | Age: 67
End: 2017-07-03
Payer: MEDICARE

## 2017-07-03 DIAGNOSIS — I25.10 CORONARY ARTERY DISEASE INVOLVING NATIVE CORONARY ARTERY OF NATIVE HEART WITHOUT ANGINA PECTORIS: ICD-10-CM

## 2017-07-03 PROCEDURE — A9555 RB82 RUBIDIUM: HCPCS | Mod: S$GLB,,, | Performed by: INTERNAL MEDICINE

## 2017-07-03 PROCEDURE — 93015 CV STRESS TEST SUPVJ I&R: CPT | Mod: S$GLB,,, | Performed by: INTERNAL MEDICINE

## 2017-07-03 PROCEDURE — 78492 MYOCRD IMG PET MLT RST&STRS: CPT | Mod: S$GLB,,, | Performed by: INTERNAL MEDICINE

## 2017-07-10 ENCOUNTER — OFFICE VISIT (OUTPATIENT)
Dept: FAMILY MEDICINE | Facility: CLINIC | Age: 67
End: 2017-07-10
Payer: MEDICARE

## 2017-07-10 VITALS
BODY MASS INDEX: 30.65 KG/M2 | OXYGEN SATURATION: 98 % | HEART RATE: 54 BPM | HEIGHT: 67 IN | RESPIRATION RATE: 19 BRPM | WEIGHT: 195.31 LBS | SYSTOLIC BLOOD PRESSURE: 142 MMHG | DIASTOLIC BLOOD PRESSURE: 80 MMHG

## 2017-07-10 DIAGNOSIS — E11.42 DIABETIC POLYNEUROPATHY ASSOCIATED WITH TYPE 2 DIABETES MELLITUS: ICD-10-CM

## 2017-07-10 DIAGNOSIS — E78.2 MIXED HYPERLIPIDEMIA: ICD-10-CM

## 2017-07-10 DIAGNOSIS — K21.9 GASTROESOPHAGEAL REFLUX DISEASE, ESOPHAGITIS PRESENCE NOT SPECIFIED: ICD-10-CM

## 2017-07-10 DIAGNOSIS — Z00.00 ENCOUNTER FOR PREVENTIVE HEALTH EXAMINATION: Primary | ICD-10-CM

## 2017-07-10 DIAGNOSIS — Z79.4 LONG-TERM INSULIN USE: ICD-10-CM

## 2017-07-10 DIAGNOSIS — I10 ESSENTIAL HYPERTENSION: ICD-10-CM

## 2017-07-10 PROCEDURE — 99499 UNLISTED E&M SERVICE: CPT | Mod: S$GLB,,, | Performed by: NURSE PRACTITIONER

## 2017-07-10 PROCEDURE — G0439 PPPS, SUBSEQ VISIT: HCPCS | Mod: S$GLB,,, | Performed by: NURSE PRACTITIONER

## 2017-07-10 PROCEDURE — 99999 PR PBB SHADOW E&M-EST. PATIENT-LVL V: CPT | Mod: PBBFAC,,, | Performed by: NURSE PRACTITIONER

## 2017-07-10 NOTE — PROGRESS NOTES
"Driss Hernandez presented for a  Medicare AWV and comprehensive Health Risk Assessment today. The following components were reviewed and updated:    · Medical history  · Family History  · Social history  · Allergies and Current Medications  · Health Risk Assessment  · Health Maintenance  · Care Team     ** See Completed Assessments for Annual Wellness Visit within the encounter summary.**       The following assessments were completed:  · Living Situation  · CAGE  · Depression Screening  · Timed Get Up and Go  · Whisper Test  · Cognitive Function Screening  · Nutrition Screening  · ADL Screening  · PAQ Screening    Vitals:    07/10/17 0814   BP: (!) 142/80   BP Location: Left arm   Patient Position: Sitting   Pulse: (!) 54   Resp: 19   SpO2: 98%   Weight: 88.6 kg (195 lb 5.2 oz)   Height: 5' 7" (1.702 m)     Body mass index is 30.59 kg/m².  Physical Exam   Constitutional: He is oriented to person, place, and time.   Cardiovascular: Regular rhythm and normal heart sounds.  Bradycardia present.    Pulses:       Radial pulses are 2+ on the right side, and 2+ on the left side.   Pulmonary/Chest: Effort normal and breath sounds normal.   Musculoskeletal: Normal range of motion. He exhibits no edema.   Neurological: He is alert and oriented to person, place, and time.   Skin: Skin is warm. Capillary refill takes less than 2 seconds.   Vitals reviewed.        Diagnoses and health risks identified today and associated recommendations/orders:    1. Encounter for preventive health examination  Education provided about preventive health examinations and procedures; addressed and discussed patient's health concerns. Additionally, reviewed medical record for risk factors and documented the results during this encounter.    2. Uncontrolled type 2 diabetes mellitus with diabetic neuropathy, with long-term current use of insulin  Education provided about diabetes, management of blood glucose with diet and activities, monitoring for " worsening effects of diabetes.  Reviewed most recent Ha1c (7.4 - May 2017), complications associated with uncontrolled diabetes.     3. Diabetes mellitus with neurological manifestations, uncontrolled  Education provided about diabetes, management of blood glucose with diet and activities, monitoring for worsening effects of diabetes.  Reviewed most recent Ha1c (7.4 - May 2017), complications associated with uncontrolled diabetes.     4. Diabetic polyneuropathy associated with type 2 diabetes mellitus  Education provided about diabetes, management of blood glucose with diet and activities, monitoring for worsening effects of diabetes.  Reviewed most recent Ha1c (7.4 - May 2017), complications associated with uncontrolled diabetes.     5. Long-term insulin use  Education provided about diabetes, management of blood glucose with diet and activities, monitoring for worsening effects of diabetes.  Reviewed most recent Ha1c (7.4 - May 2017), complications associated with uncontrolled diabetes.     6. Gastroesophageal reflux disease, esophagitis presence not specified  Symptoms controlled. Education provided about reflux. Continue as advised.     7. Mixed hyperlipidemia  Stable, asymptomatic on ASA, cholesterol managing STATIN, and antihypertensive medication; monitor    8. Essential hypertension  Presently elevated. Discussed diet, activities, educated about blood pressure physiology. Encouraged to continue with medication compliance, engagement in structured fitness activities.       Provided Driss with a 5-10 year written screening schedule and personal prevention plan. Recommendations were developed using the USPSTF age appropriate recommendations. Education, counseling, and referrals were provided as needed. After Visit Summary printed and given to patient which includes a list of additional screenings\tests needed.    Clinical note sensitive as patient is new to the clinic and do not want any discrepancies in the  "history cause any problems at this point"This note will not be shared with the patient."    Return in about 1 year (around 7/10/2018) for Health Risk Assessment .    Alan Salazar Jr, NP  "

## 2017-07-10 NOTE — PATIENT INSTRUCTIONS
Counseling and Referral of Other Preventative  (Italic type indicates deductible and co-insurance are waived)    Patient Name: Driss Hernandez  Today's Date: 7/10/2017      SERVICE LIMITATIONS RECOMMENDATION    Vaccines    · Pneumococcal (once after 65)    · Influenza (annually)    · Hepatitis B (if medium/high risk)    · Prevnar 13      Hepatitis B medium/high risk factors:       - End-stage renal disease       - Hemophiliacs who received Factor VII or         IX concentrates       - Clients of institutions for the mentally             retarded       - Persons who live in the same house as          a HepB carrier       - Homosexual men       - Illicit injectable drug abusers     Pneumococcal: Done, no repeat necessary     Influenza: Done, repeat in one year     Hepatitis B: N/A     Prevnar 13: Done, no repeat necessary    Prostate cancer screening (annually to age 75)     Prostate specific antigen (PSA) Shared decision making with Provider. Sometimes a co-pay may be required if the patient decides to have this test. The USPSTF no longer recommends prostate cancer screening routinely in medicine: every 1 year    Colorectal cancer screening (to age 75)    · Fecal occult blood test (annual)  · Flexible sigmoidoscopy (5y)  · Screening colonoscopy (10y)  · Barium enema   Done this year, repeat every 3-5 years    Diabetes self-management training (no USPSTF recommendations)  Requires referral by treating physician for patient with diabetes or renal disease. 10 hours of initial DSMT sessions of no less than 30 minutes each in a continuous 12-month period. 2 hours of follow-up DSMT in subsequent years.  Requires referral by treating physician for patient with diabetes    Glaucoma screening (no USPSTF recommendation)  Diabetes mellitus, family history   , age 50 or over    American, age 65 or over  discussed with patient      Medical nutrition therapy for diabetes or renal disease (no recommended  schedule)  Requires referral by treating physician for patient with diabetes or renal disease or kidney transplant within the past 3 years.  Can be provided in same year as diabetes self-management training (DSMT), and CMS recommends medical nutrition therapy take place after DSMT. Up to 3 hours for initial year and 2 hours in subsequent years.  Requires referral by treating physician for patient with diabetes    Cardiovascular screening blood tests (every 5 years)  · Fasting lipid panel  Order as a panel if possible  Done this year, repeat every year    Diabetes screening tests (at least every 3 years, Medicare covers annually or at 6-month intervals for prediabetic patients)  · Fasting blood sugar (FBS) or glucose tolerance test (GTT)  Patient must be diagnosed with one of the following:       - Hypertension       - Dyslipidemia       - Obesity (BMI 30kg/m2)       - Previous elevated impaired FBS or GTT       ... or any two of the following:       - Overweight (BMI 25 but <30)       - Family history of diabetes       - Age 65 or older       - History of gestational diabetes or birth of baby weighing more than 9 pounds  Done this year, repeat every 6 months     Abdominal aortic aneurysm screening (once)  · Sonogram   Limited to patients who meet one of the following criteria:       - Men who are 65-75 years old and have smoked more than 100 cigarette in their lifetime       - Anyone with a family history of abdominal aortic aneurysm       - Anyone recommended for screening by the USPSTF Followed  By [facility] cardiology       HIV screening (annually for increased risk patients)  · HIV-1 and HIV-2 by EIA, or RONY, rapid antibody test or oral mucosa transudate  Patients must be at increased risk for HIV infection per USPSTF guidelines or pregnant. Tests covered annually for patient at increased risk or as requested by the patient. Pregnant patients may receive up to 3 tests during pregnancy.  no clinical risk  factors      Smoking cessation counseling (up to 8 sessions per year)  Patients must be asymptomatic of tobacco-related conditions to receive as a preventative service.  Non-smoker    Subsequent annual wellness visit  At least 12 months since last AWV  Return in one year     The following information is provided to all patients.  This information is to help you find resources for any of the problems found today that may be affecting your health:                Living healthy guide: www.[facility].[place].[facility]      Understanding Diabetes: www.diabetes.org      Eating healthy: www.cdc.gov/healthyweight      [facility] home safety checklist: www.cdc.gov/steadi/patient.html      Agency on Aging: www.goea.[place].[facility]      Alcoholics anonymous (AA): www.aa.org      Physical Activity: www.nam.nih.gov/cf8bopa      Tobacco use: www.quitwithusla.org

## 2017-07-17 ENCOUNTER — OFFICE VISIT (OUTPATIENT)
Dept: CARDIOLOGY | Facility: CLINIC | Age: 67
End: 2017-07-17
Payer: MEDICARE

## 2017-07-17 VITALS
HEIGHT: 67 IN | BODY MASS INDEX: 31.01 KG/M2 | SYSTOLIC BLOOD PRESSURE: 160 MMHG | OXYGEN SATURATION: 97 % | HEART RATE: 53 BPM | WEIGHT: 197.56 LBS | DIASTOLIC BLOOD PRESSURE: 73 MMHG

## 2017-07-17 DIAGNOSIS — I25.10 CORONARY ARTERY DISEASE INVOLVING NATIVE CORONARY ARTERY OF NATIVE HEART WITHOUT ANGINA PECTORIS: Primary | ICD-10-CM

## 2017-07-17 DIAGNOSIS — I10 ESSENTIAL HYPERTENSION: ICD-10-CM

## 2017-07-17 DIAGNOSIS — E78.2 MIXED HYPERLIPIDEMIA: ICD-10-CM

## 2017-07-17 PROCEDURE — 99999 PR PBB SHADOW E&M-EST. PATIENT-LVL III: CPT | Mod: PBBFAC,,, | Performed by: INTERNAL MEDICINE

## 2017-07-17 PROCEDURE — 99214 OFFICE O/P EST MOD 30 MIN: CPT | Mod: S$GLB,,, | Performed by: INTERNAL MEDICINE

## 2017-07-17 PROCEDURE — 1126F AMNT PAIN NOTED NONE PRSNT: CPT | Mod: S$GLB,,, | Performed by: INTERNAL MEDICINE

## 2017-07-17 PROCEDURE — 4010F ACE/ARB THERAPY RXD/TAKEN: CPT | Mod: S$GLB,,, | Performed by: INTERNAL MEDICINE

## 2017-07-17 PROCEDURE — 99499 UNLISTED E&M SERVICE: CPT | Mod: S$GLB,,, | Performed by: INTERNAL MEDICINE

## 2017-07-17 PROCEDURE — 1159F MED LIST DOCD IN RCRD: CPT | Mod: S$GLB,,, | Performed by: INTERNAL MEDICINE

## 2017-07-17 PROCEDURE — 3045F PR MOST RECENT HEMOGLOBIN A1C LEVEL 7.0-9.0%: CPT | Mod: S$GLB,,, | Performed by: INTERNAL MEDICINE

## 2017-07-17 NOTE — PROGRESS NOTES
Subjective:    Patient ID:  Driss Hernandez Jr. is a 66 y.o. male who presents for evaluation of Results      HPI   previous history:  Here to establish care for history of coronary artery disease.  He had CABG ×3 in 2016 [facility] [address] for unstable angina.  He stated that he possibly could have had 6 bypasses and has other branch vessel disease it's on revascularize.  He's had on and off mild chest pain symptoms which were lesser in severity in comparison to when he had his bypass surgery.  They are the same quality.  Says he does have exertional symptoms.  He also gets some discomfort in his abdomen which she says may be related.  He denies any other associated symptoms.  He's express no PND, orthopnea or lower edema.  He denies any dizziness, presyncope or syncope.  His daughter is Kike who is the palliative nurse at [employer].    Today:  Here for follow-up of coronary artery disease.  He underwent diagnostic testing as below.  He denies any repetitive chest pain episodes.  He says that he recently went several weeks but then expresses similar episode after eating red beans.  He felt like it was related to passing gas.  Says this did show some improvement.  He denies any other associated symptoms.  He underwent diagnostic testing including PET stress as below.  This was within normal limits.  Overall he has normal LV function.  His Holter was within normal limits.  He denies any PND, orthopnea or lower edema.  He's not expressing dizziness, presyncope or syncope.    Review of Systems   Constitution: Negative.   HENT: Negative.    Eyes: Negative.    Cardiovascular: Negative for chest pain, dyspnea on exertion, irregular heartbeat, leg swelling, near-syncope, orthopnea, palpitations, paroxysmal nocturnal dyspnea and syncope.   Respiratory: Negative for shortness of breath.    Skin: Negative.    Musculoskeletal: Negative.    Gastrointestinal: Positive for flatus. Negative for abdominal pain,  constipation, diarrhea and heartburn.   Genitourinary: Negative for dysuria.   Neurological: Negative for dizziness.   Psychiatric/Behavioral: Negative.           Objective:    Physical Exam   Constitutional: He is oriented to person, place, and time. He appears well-developed and well-nourished. No distress.   HENT:   Head: Normocephalic and atraumatic.   Eyes: Conjunctivae and EOM are normal. Pupils are equal, round, and reactive to light.   Neck: Normal range of motion. Neck supple. No thyromegaly present.   Cardiovascular: Normal rate, regular rhythm and normal heart sounds.    No murmur heard.  Pulmonary/Chest: Effort normal and breath sounds normal. No respiratory distress. He has no wheezes. He has no rales. He exhibits no tenderness.   Abdominal: Soft. Bowel sounds are normal.   Musculoskeletal: He exhibits no edema.   Neurological: He is alert and oriented to person, place, and time.   Skin: Skin is warm and dry.   Psychiatric: He has a normal mood and affect. His behavior is normal.           PET stress:  CONCLUSIONS: NORMAL MYOCARDIAL PERFUSION PET STRESS TEST  1. The perfusion scan is free of evidence for myocardial ischemia or injury.   2. Resting wall motion is physiologic. Stress wall motion is physiologic.   3. LV function is normal at rest and stress.  (normal is >= 51%)  4. The ventricular volumes are normal at rest and stress.   5. The extracardiac distribution of radioactivity is normal.   6. There was no previous study available to compare.    Echocardiogram:  CONCLUSIONS     1 - Normal left ventricular systolic function (EF 60-65%).     2 - No wall motion abnormalities.     3 - Concentric remodeling.     4 - Impaired LV relaxation, elevated LAP (grade 2 diastolic dysfunction).     5 - Trivial mitral regurgitation.     Holter within normal limits    Assessment:       1. Coronary artery disease involving native coronary artery of native heart without angina pectoris    2. Essential hypertension     3. Mixed hyperlipidemia    4. Uncontrolled type 2 diabetes mellitus without complication, with long-term current use of insulin         Plan:       -Mainly reassurance in light of testing  -Known coronary artery disease with one revascularized branch vessel disease post CABG  -Continue risk factor modification i.e. diet and exercise as tolerated  -Get records from the heart clinic    Return to clinic in 3 months

## 2017-07-31 ENCOUNTER — TELEPHONE (OUTPATIENT)
Dept: FAMILY MEDICINE | Facility: CLINIC | Age: 67
End: 2017-07-31

## 2017-07-31 DIAGNOSIS — H53.9 VISUAL DISTURBANCES: Primary | ICD-10-CM

## 2017-07-31 NOTE — TELEPHONE ENCOUNTER
----- Message from Munir Dee sent at 7/31/2017  9:23 AM CDT -----  Contact: Misa/Spouse/[phone]  Misa is requesting a referral for a Opthalmology  appointment. Thank you.

## 2017-07-31 NOTE — TELEPHONE ENCOUNTER
Call back, if indeed this is an acute vision change, he needs to be seen by ophthalmology or optometry ASAP    Need to know how urgently need to put this in, if blurry vision is persistent, this needs to be urgent.  Call optometry and make him an appointment in the next day or so

## 2017-07-31 NOTE — TELEPHONE ENCOUNTER
Stated,  is having problems seeing out of the right eye. Vision has become more blurred than before. Does wear eyeglasses. He gets his regular check up yearly. This is a new onset problem. Requesting a referral to see a doctor here at the [facility] location.

## 2017-08-02 NOTE — TELEPHONE ENCOUNTER
----- Message from Munir Dee sent at 8/2/2017  8:47 AM CDT -----  Contact: Ada/Spouse/[phone]  Misa is following up on an ophthalmology/optometry referral. Thank you.

## 2017-08-02 NOTE — TELEPHONE ENCOUNTER
I need a referral for optometry, patient has been scheduled. I will link it to the appointment once it has been placed. Thank you.

## 2017-08-07 ENCOUNTER — INITIAL CONSULT (OUTPATIENT)
Dept: OPTOMETRY | Facility: CLINIC | Age: 67
End: 2017-08-07
Payer: MEDICARE

## 2017-08-07 DIAGNOSIS — E11.9 TYPE 2 DIABETES MELLITUS WITHOUT RETINOPATHY: Primary | ICD-10-CM

## 2017-08-07 DIAGNOSIS — H53.131: ICD-10-CM

## 2017-08-07 DIAGNOSIS — H25.13 NUCLEAR SCLEROSIS, BILATERAL: ICD-10-CM

## 2017-08-07 DIAGNOSIS — H52.7 REFRACTIVE ERROR: ICD-10-CM

## 2017-08-07 PROCEDURE — 99999 PR PBB SHADOW E&M-EST. PATIENT-LVL II: CPT | Mod: PBBFAC,,, | Performed by: OPTOMETRIST

## 2017-08-07 PROCEDURE — 99499 UNLISTED E&M SERVICE: CPT | Mod: S$GLB,,, | Performed by: OPTOMETRIST

## 2017-08-07 PROCEDURE — 92004 COMPRE OPH EXAM NEW PT 1/>: CPT | Mod: S$GLB,,, | Performed by: OPTOMETRIST

## 2017-08-07 NOTE — LETTER
August 7, 2017      Jono Willoughby MD  [address] [place] [address]           [facility]  [address] [place] [address]  Phone: [phone]  Fax: [phone]          Patient: Driss Hernandez Jr.   MR Number: [id number]   YOB: 1950   Date of Visit: 8/7/2017       Dear Dr. Jono Willoughby:    Thank you for referring Driss Hernandez to me for evaluation. Attached you will find relevant portions of my assessment and plan of care.    If you have questions, please do not hesitate to call me. I look forward to following Driss Hernandez along with you.    Sincerely,    Iva Orantes, OD    Enclosure  CC:  No Recipients    If you would like to receive this communication electronically, please contact [email or web address] or [phone] to request more information on [employer] Link access.    For providers and/or their staff who would like to refer a patient to [facility], please contact us through our one-stop-shop provider referral line, [facility], at [phone].    If you feel you have received this communication in error or would no longer like to receive these types of communications, please e-mail [email or web address]

## 2017-08-07 NOTE — PROGRESS NOTES
Subjective:       Patient ID: Driss Hernandez Jr. is a 66 y.o. male      Chief Complaint   Patient presents with    Blurred Vision    Diabetic Eye Exam     IDDM 2; A1c = 7.4 (5/15/17), LBS 140s     History of Present Illness  Dls: 8 months     Pt here for diabetic eye exam.   Pt c/o blurry vision at distance od getting worse. Pt wears bifocal   glasses. Pt doesn't really wear them. Didn't bring them today. Pt states   no tearing no itching no burning no pain no ha's no floaters.     Eye meds:   None    Hemoglobin A1C       Date                     Value               Ref Range           Status                05/15/2017               7.4 (H)             4.5 - 6.2 %         Final           01/28/2017               7.8 (H)             4.5 - 6.2 %         Final             ----------        Assessment/Plan:     1. Type 2 diabetes mellitus without retinopathy  No diabetic retinopathy. Educated patient on importance of good blood sugar control, compliance with meds, and follow up care with PCP. Return in 1 year for dilated eye exam, sooner PRN.    2. Nuclear sclerosis, bilateral  VS to patient OD - no improvement with Rx, NVS OS. Discussed diagnosis with patient, different lens options, and surgical process. Referral to Dr. Hong for cataract evaluation.   - Ambulatory referral to Ophthalmology    3. Refractive error  No SRx at this time. Pt requests cataract surgery consult.     Return for Cataract consult with Dr. Hong.

## 2017-08-15 ENCOUNTER — OFFICE VISIT (OUTPATIENT)
Dept: FAMILY MEDICINE | Facility: CLINIC | Age: 67
End: 2017-08-15
Payer: MEDICARE

## 2017-08-15 VITALS
SYSTOLIC BLOOD PRESSURE: 130 MMHG | HEART RATE: 62 BPM | HEIGHT: 67 IN | DIASTOLIC BLOOD PRESSURE: 80 MMHG | TEMPERATURE: 98 F | BODY MASS INDEX: 31.28 KG/M2 | WEIGHT: 199.31 LBS | OXYGEN SATURATION: 97 %

## 2017-08-15 DIAGNOSIS — M54.31 RIGHT SIDED SCIATICA: Primary | ICD-10-CM

## 2017-08-15 DIAGNOSIS — M25.512 CHRONIC LEFT SHOULDER PAIN: ICD-10-CM

## 2017-08-15 DIAGNOSIS — G89.29 CHRONIC LEFT SHOULDER PAIN: ICD-10-CM

## 2017-08-15 PROCEDURE — 3079F DIAST BP 80-89 MM HG: CPT | Mod: S$GLB,,, | Performed by: INTERNAL MEDICINE

## 2017-08-15 PROCEDURE — 1159F MED LIST DOCD IN RCRD: CPT | Mod: S$GLB,,, | Performed by: INTERNAL MEDICINE

## 2017-08-15 PROCEDURE — 99999 PR PBB SHADOW E&M-EST. PATIENT-LVL III: CPT | Mod: PBBFAC,,, | Performed by: INTERNAL MEDICINE

## 2017-08-15 PROCEDURE — 3008F BODY MASS INDEX DOCD: CPT | Mod: S$GLB,,, | Performed by: INTERNAL MEDICINE

## 2017-08-15 PROCEDURE — 3075F SYST BP GE 130 - 139MM HG: CPT | Mod: S$GLB,,, | Performed by: INTERNAL MEDICINE

## 2017-08-15 PROCEDURE — 99214 OFFICE O/P EST MOD 30 MIN: CPT | Mod: S$GLB,,, | Performed by: INTERNAL MEDICINE

## 2017-08-15 RX ORDER — NATEGLINIDE 120 MG/1
TABLET ORAL
COMMUNITY
Start: 2017-08-07 | End: 2017-08-28 | Stop reason: DRUGHIGH

## 2017-08-15 NOTE — PROGRESS NOTES
Chief complaint: Leg pain and shoulder pain    66-year-old white male for 3 months he's had a pain in the right lower leg.  It starts in the shin that radiates down over the dorsal aspect of the right foot.  Moderate severity.  Sudden onset.  Only occurs when he goes to sit down on his couch.  He gets up and walks around it goes away.  He is active working out at the gym without problems.  Worse if he lays on the right side so he's been sleeping on the left side.  Better if he takes Naprosyn which he is aware he should not take her off due to his renal insufficiency.  His last back surgery an MRI and so forth was way back in .  Also several months of some pain in the left shoulder on abduction consistent with intermittent but not stopping him from going to the gym.  He does do a lot of repetitious abdominal work with weights at the gym.      ROS:   CONST: weight stable. EYES: no vision change. ENT: no sore throat. CV: no chest pain w/ exertion. RESP: no shortness of breath. GI: no nausea, vomiting, diarrhea. No dysphagia. : no urinary issues. MUSCULOSKELETAL: no new myalgias or arthralgias. SKIN: no new changes. NEURO: no focal deficits. PSYCH: no new issues. ENDOCRINE: no polyuria. HEME: no lymph nodes. ALLERGY: no general pruritis.    Past medical history:  1.  Uncontrolled diabetes with neuropathy, followed by Dr. Agrawal  2.  Coronary artery disease with triple bypass 2016, followed by the heart clinic. Now [facility], neg PET   3.  Back surgery .  5.  Hyperlipidemia.  6.  Hypertension.  7.  Never had colonoscopy  8.  Pneumovax and Prevnar given  according to records    Family history: Father  at 77 with stroke.  Mother  at 72 with heart problems, diabetes, hypertension.  2 sisters living in their 80s and one  at 82.  One brother  at 80 with some kidney disease and diabetes.  Sisters with diabetes, hypertension and stroke.  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   47 years with no primary Junius 2 children.  He drinks alcohol about 3 times.  Never smoked.          Gen: no distress  BACK: spine with no defect, good ROM near to floor, good extension, stable. Both legs full ROM, no defects, joints stable, strength 5/5. Negative straight leg testing.SHOULDER: both arms no defects, impingement on abduction on  the left     with reduced ROM and pain in supraspinatus. Other shoulder has good ROM. Both joints stable. Strength 5/5 both arms but slightly reduced due to pain on affected side.   SKIN no rashes, warm to touch.  NECK full ROM, no defect, no muscle pain      Driss was seen today for foot pain and shoulder pain.    Diagnoses and all orders for this visit:    Right sided sciatica, positional nerve impingement on the right with history of lumbar surgery remotely.  We'll pursue MRI to assess degree of nerve impingement.  We discussed its positional nature and it could be mechanical may require other x-rays such as flexion and extension x-rays in the future.  Discussed potential to refer to the spine clinic and pain management based upon results, he should limit anti-inflammatories or not taken at all and perhaps even try Tylenol arthritis  -     MRI Lumbar Spine Without Contrast; Future    Chronic left shoulder pain, some signs of impingement but no signs of rotator cuff tear or tendinitis significantly.  Refer to physical therapy for appropriate instruction on exercises.  Probably needs to discuss with physical therapy for reduction in his repetitious exercises at the gym  -     Ambulatory Referral to Physical/Occupational Therapy           Clinical note sensitive as patient is new to the clinic and do not want any discrepancies in the history cause any problems at this point"This note will not be shared with the patient."    "

## 2017-08-17 ENCOUNTER — TELEPHONE (OUTPATIENT)
Dept: FAMILY MEDICINE | Facility: CLINIC | Age: 67
End: 2017-08-17

## 2017-08-21 ENCOUNTER — TELEPHONE (OUTPATIENT)
Dept: FAMILY MEDICINE | Facility: CLINIC | Age: 67
End: 2017-08-21

## 2017-08-21 NOTE — TELEPHONE ENCOUNTER
----- Message from Kristyn Hernandez sent at 8/21/2017  8:43 AM CDT -----  Contact: Pt  Pt called to speak to the nurse regarding scheduling his MRI and physical therapy appts and would like a call back this morning asap. Pt stated that he has left several messages on last week and no one returned his call. Pt also stated that his telephone rang one time and then hung up.    Pt can be reached at [phone].    Thanks

## 2017-08-22 ENCOUNTER — CLINICAL SUPPORT (OUTPATIENT)
Dept: REHABILITATION | Facility: HOSPITAL | Age: 67
End: 2017-08-22
Attending: INTERNAL MEDICINE
Payer: MEDICARE

## 2017-08-22 DIAGNOSIS — M25.512 CHRONIC LEFT SHOULDER PAIN: ICD-10-CM

## 2017-08-22 DIAGNOSIS — R29.898 DECREASED STRENGTH OF UPPER EXTREMITY: ICD-10-CM

## 2017-08-22 DIAGNOSIS — M25.619 DECREASED RANGE OF MOTION (ROM) OF SHOULDER: ICD-10-CM

## 2017-08-22 DIAGNOSIS — G89.29 CHRONIC LEFT SHOULDER PAIN: ICD-10-CM

## 2017-08-22 DIAGNOSIS — R29.3 POSTURE IMBALANCE: ICD-10-CM

## 2017-08-22 PROCEDURE — 97110 THERAPEUTIC EXERCISES: CPT | Mod: PN

## 2017-08-22 PROCEDURE — G8979 MOBILITY GOAL STATUS: HCPCS | Mod: CI,PN

## 2017-08-22 PROCEDURE — 97161 PT EVAL LOW COMPLEX 20 MIN: CPT | Mod: PN

## 2017-08-22 PROCEDURE — G8978 MOBILITY CURRENT STATUS: HCPCS | Mod: CJ,PN

## 2017-08-22 NOTE — PROGRESS NOTES
"                                                  Physical Therapy Initial Evaluation       Date: 08/22/2017    Patient Name: Driss Hernandez Jr.  Clinic Number: [id number]  Age: 66 y.o.  Gender: male    Diagnosis:   Encounter Diagnoses   Name Primary?    Chronic left shoulder pain     Decreased strength of upper extremity     Decreased range of motion (ROM) of shoulder     Posture imbalance        Referring Physician: Jono Willoughby MD  Treatment Orders: PT Eval and Treat      History     Past Medical History:   Diagnosis Date    Diabetes mellitus with neurological manifestations, uncontrolled 1/24/2017    Diabetic polyneuropathy associated with type 2 diabetes mellitus 1/24/2017    Essential hypertension 1/24/2017    Gastroesophageal reflux disease 1/24/2017    Hyperlipidemia 1/24/2017    Insomnia 1/24/2017    Long-term insulin use 1/24/2017    Obesity     Uncontrolled type 2 diabetes mellitus without complication, with long-term current use of insulin 1/24/2017       Current Outpatient Prescriptions   Medication Sig    ACCU-CHEK MOIZ CONTROL SOLN Soln     ACCU-CHEK MOIZ PLUS TEST STRP Strp     ACCU-CHEK SOFTCLIX LANCETS Misc     aspirin 325 MG tablet Take 325 mg by mouth once daily.    atorvastatin (LIPITOR) 40 MG tablet Take 40 mg by mouth once daily.     BD ALCOHOL SWABS PadM     BD INSULIN PEN NEEDLE UF MINI 31 gauge x 3/16" Ndle     fenofibrate 160 MG Tab Take 160 mg by mouth once daily.     gabapentin (NEURONTIN) 100 MG capsule Take 100 mg by mouth 2 (two) times daily.     insulin aspart protamine-insulin aspart (NOVOLOG 70/30) 100 unit/mL (70-30) InPn pen Inject into the skin 2 (two) times daily before meals.    LANTUS SOLOSTAR 100 unit/mL (3 mL) InPn pen     lisinopril (PRINIVIL,ZESTRIL) 40 MG tablet Take 40 mg by mouth once daily.     metformin (GLUCOPHAGE-XR) 500 MG 24 hr tablet Take 500 mg by mouth 2 (two) times daily with meals.     metoprolol succinate (TOPROL-XL) 25 MG " "24 hr tablet Take by mouth once daily.     nateglinide (STARLIX) 120 MG tablet     nateglinide (STARLIX) 60 MG tablet Take 60 mg by mouth 3 (three) times daily before meals.    omeprazole (PRILOSEC) 20 MG capsule Take 20 mg by mouth once daily.     sildenafil (REVATIO) 20 mg Tab 1-5 pills at a time per day prn    tizanidine (ZANAFLEX) 4 MG tablet Take 4 mg by mouth every 6 (six) hours as needed.    triazolam (HALCION) 0.25 MG Tab TK 1 T PO QHS     No current facility-administered medications for this visit.        Review of patient's allergies indicates:   Allergen Reactions    Penicillins          Subjective     Patient states:  He started feeling L shoulder pain, beginning 3 months ago. He relates to increased exercise activity at gym (4-5x/week). He is experiencing a pinching pain in anterior/lateral L shoulder. Pt denies numbness/tingling. Pt had carpal tunnel surgery on L hand ~10 years ago. Lumbar fusion in 15 years ago. MRI tomorrow for low back. He was using ab-crunch machine with B UE assist and believes that increased pain. He is having most difficulty with reaching for seat belt with L UE. He has chronic low back pain, with surgical operation performed ~15 years ago. He is experiencing R foot pain, which he relates to "pinched nerve" in lumbar pain. Pt is retired, he occasionally helps company. He denies any strength discrepancies between either UE. He is not doing "as much resistance" on ab machine. He is performing resistance training on B UE that he wants to do. Pt feeling difficulty lowering arm from a raised position. Pt had by-pass surgery    Pain Scale: Driss rates pain on a scale of 0-10 to be 6 at worst; 0 currently; 0  at best .  Onset: gradual  Radicular symptoms:  None  Aggravating factors:   Reaching back on ipsilateral side with L UE (flexion/ER), reaching for seat belt on same side of L UE  Easing factors:  Tylenol, stopping L UE activity  Prior Therapy: None  Functional Deficits " "Leading to Referral: Difficulty reaching seat belt, reaching backwards, reaching overhead  Prior functional status: Independent  DME owned/used: None  Occupation:  Retired, desk-work                       Pts goals:  To reduce pain, reach back for seat belt, reach over-head pain-free    Objective     Posture: rounded shoulders, forward head  Dermatomes: Intact  Myotomes: Intact  Palpation: Tenderness at anterior GHJ at subacromial space, supraspinatus insertion lateral shoulder    Shoulder Range of Motion:   ACTIVE ROM LEFT RIGHT   Flexion 140 (painful arc ~80 deg flexion) 150    Abduction 145 p! Ant L     Extension 30 30   IR 75 80   ER 65 80     PASSIVE ROM LEFT RIGHT   Flexion 150 165   Abduction 155 165   IR/90deg 80 85   ER/90deg 70 85       STRENGTH LEFT RIGHT   Flexion 5/5 5/5   Abduction 4/5 5/5   Extension 4/5 4/5   IR (neutral) 4+/5 p! Ant L GHJ 5/5   ER (neutral) 4/5 p! Ant L GHJ 4/5   Middle Trap 3+/5 p! Lateral shoulder 3+/5   Lower Trap 3-/5 4-/5     Joint Mobility: L GHJ WFL    Scapular Control/Dyskinesis:    Normal / Subtle / Obvious   Left Subtle Medial scapular winging    Right Subtle Medial scapular winging     Special Tests:    Left Right   Empty Can (Supraspinatus) Negative Negative   Donna (Supraspinatus) Negative Negative   Neer Impingement Positive Negative   Fung Chele Positive Negative   Crossover Impingement Negative Negative   Lift Off (Subscap) Positive Negative   Anterior Apprehension Negative Negative   O'Briens (Labrum) Negative Negative     TREATMENT     Time In: 2:00pm  Time Out: 3:00pm    PT Evaluation Completed? Yes  Discussed Plan of Care with patient: Yes    Driss received 10 minutes of therapeutic exercise & instruction including:    Scap Retract 20x  Doorway Stretch 3x30"    Driss received 0 minutes of manual therapy including:    Written Home Exercises Provided: Yes - Scap Retract, Doorway Str  Driss demo good understanding of the education provided. Patient demo " good return demo of skill of exercises.    Assessment     Patient presents to Physical Therapy Evaluation with diagnosis of L Shoulder Pain, with signs and symptoms including: increased anterior/lateral L GHJ pain, decreased L UE strength, decreased L shoulder ROM, scapular dyskinesis, postural imbalance, muscle imbalance, and decreased tolerance to functional activities. Pt with majority of pain provoked in L anterior GHJ consistent with impingement of subacromial structures, with occasional lateral shoulder pain at supraspinatus insertion. Pt with mild postural deficits with forward head and rounded shoulders contributing to muscular imbalance and subacromial impingement of soft tissue structures. Pt with appropriate L glenohumeral joint mobility, with no significant deficits in glides in either direction. Pt with no sensory abnormalities throughout entire treatment session. Pt with signs and symptoms consistent with subacromial impingement of L UE. Pt with good motivation to perform physical activity and responds well to cueing.    Pt prognosis is Good.  Pt will benefit from skilled outpatient physical therapy to address the above stated deficits, provide pt/family education and to maximize pt's level of independence.       Medical necessity is demonstrated by the following IMPAIRMENTS/PROBLEMS:  weakness, impaired endurance, impaired self care skills, impaired functional mobility, decreased coordination, decreased upper extremity function, pain, abnormal tone, decreased ROM, impaired joint extensibility and impaired muscle length      History  Co-morbidities and personal factors that may impact the plan of care Examination  Body Structures and Functions, activity limitations and participation restrictions that may impact the plan of care Clinical Presentation   Decision Making/ Complexity Score   Co-morbidities:       T2DM          Personal Factors:    Body Regions: BUE, trunk/core     Body Systems:  musculoskeletal (ROM, strength, endurance, flexibility, gait); neuromuscular (balance, posture, motor control, coordination)          Activity limitations: reaching backwards, lifting, carrying, pushing/pulling, reaching overhead      Participation Restrictions: exercising, heavy household activities, driving         Stable, uncomplicated   Low Complexity    FOTO Shoulder Survey  Score: 27% Limitation        Pt's spiritual, cultural and educational needs considered and pt agreeable to plan of care and goals as stated below:     Short Term GOALS: 4 weeks. Pt agrees with goals set.  1. Patient demonstrates independence with HEP.   2. Patient demonstrates independence with Postural Awareness.   3. Pt will demonstrate ability to reach L UE to shoulder-height shelf, no symptom provocation, appropriate movement pattern    4. Patient will report pain of 4/10 at worst, on 0-10 pain scale, with all activity  5. Pt will increase strength to 1/3 muscle grade or greater to improve tolerance to all functional activities pain free.     Long Term Goals 8 Weeks. Pt agrees with goals set:  1. Pt will increase ROM to WNL to improve functional reach pain free.    2. Pt will increase strength to 4+/5 or greater to improve tolerance to all functional activities pain free.   3. Pt demonstrates improved function per FOTO Shoulder Survey to 20% Limitation or less.   4. Patient will report pain of 2/10 at worst, on 0-10 pain scale, with all activity  5. Pt will demonstrate ability to reach L UE to over-head shelf, no symptom provocation, appropriate movement pattern     Functional Limitations Reports - G Codes  Category: Mobility  Tool: FOTO Shoulder Survey  Score: 27% Limitation    TEST SCORE  Modifier  Impairment Limitation Restriction    0  CH  0 % impaired, limited or restricted   1-23  CI  @ least 1% but less than 20% impaired, limited or restricted   24-47  CJ  @ least 20%<40% impaired, limited or restricted   48-71  CK  @ least  40%<60% impaired, limited or restricted   72-95  CL  @ least 60% <80% impaired, limited or restricted     CM  @ least 80%<100% impaired limited or restricted   120  CN  100% impaired, limited or restricted     Current ():  CJ = 20-40% Limitation  Goal (): CI = 1-20% Limitation    Plan     Certification Period: 8/22/17 - 10/22/17    Outpatient physical therapy 1-2 times weekly to include: pt ed, hep, therapeutic exercises, neuromuscular re-education/ balance exercises, joint mobilizations, aquatic therapy and modalities prn. Cont PT for  6-8 weeks. Pt may be seen by PTA as part of the rehabilitation team.    Therapist: Akhil Guajardo, PT  8/22/2017

## 2017-08-22 NOTE — PLAN OF CARE
"                                                  Physical Therapy Initial Evaluation       Date: 08/22/2017    Patient Name: Driss Hernandez Jr.  Clinic Number: [id number]  Age: 66 y.o.  Gender: male    Diagnosis:   Encounter Diagnoses   Name Primary?    Chronic left shoulder pain     Decreased strength of upper extremity     Decreased range of motion (ROM) of shoulder     Posture imbalance      Referring Physician: Jono Willoughby MD  Treatment Orders: PT Eval and Treat    Subjective     Patient states:  He started feeling L shoulder pain, beginning 3 months ago. He relates to increased exercise activity at gym (4-5x/week). He is experiencing a pinching pain in anterior/lateral L shoulder. Pt denies numbness/tingling. Pt had carpal tunnel surgery on L hand ~10 years ago. Lumbar fusion in 15 years ago. MRI tomorrow for low back. He was using ab-crunch machine with B UE assist and believes that increased pain. He is having most difficulty with reaching for seat belt with L UE. He has chronic low back pain, with surgical operation performed ~15 years ago. He is experiencing R foot pain, which he relates to "pinched nerve" in lumbar pain. Pt is retired, he occasionally helps company. He denies any strength discrepancies between either UE. He is not doing "as much resistance" on ab machine. He is performing resistance training on B UE that he wants to do. Pt feeling difficulty lowering arm from a raised position. Pt had by-pass surgery    Pain Scale: Driss rates pain on a scale of 0-10 to be 6 at worst; 0 currently; 0  at best .  Onset: gradual  Radicular symptoms:  None  Aggravating factors:   Reaching back on ipsilateral side with L UE (flexion/ER), reaching for seat belt on same side of L UE  Easing factors:  Tylenol, stopping L UE activity  Prior Therapy: None  Functional Deficits Leading to Referral: Difficulty reaching seat belt, reaching backwards, reaching overhead  Prior functional status: " "Independent  DME owned/used: None  Occupation:  Retired, desk-work                       Pts goals:  To reduce pain, reach back for seat belt, reach over-head pain-free    Objective     Posture: rounded shoulders, forward head  Dermatomes: Intact  Myotomes: Intact  Palpation: Tenderness at anterior GHJ at subacromial space, supraspinatus insertion lateral shoulder    Shoulder Range of Motion:   ACTIVE ROM LEFT RIGHT   Flexion 140 (painful arc ~80 deg flexion) 150    Abduction 145 p! Ant L     Extension 30 30   IR 75 80   ER 65 80     PASSIVE ROM LEFT RIGHT   Flexion 150 165   Abduction 155 165   IR/90deg 80 85   ER/90deg 70 85       STRENGTH LEFT RIGHT   Flexion 5/5 5/5   Abduction 4/5 5/5   Extension 4/5 4/5   IR (neutral) 4+/5 p! Ant L GHJ 5/5   ER (neutral) 4/5 p! Ant L GHJ 4/5   Middle Trap 3+/5 p! Lateral shoulder 3+/5   Lower Trap 3-/5 4-/5     Joint Mobility: L GHJ WFL    Scapular Control/Dyskinesis:    Normal / Subtle / Obvious   Left Subtle Medial scapular winging    Right Subtle Medial scapular winging     Special Tests:    Left Right   Empty Can (Supraspinatus) Negative Negative   Donna (Supraspinatus) Negative Negative   Neer Impingement Positive Negative   Fung Chele Positive Negative   Crossover Impingement Negative Negative   Lift Off (Subscap) Positive Negative   Anterior Apprehension Negative Negative   O'Briens (Labrum) Negative Negative     TREATMENT     Time In: 2:00pm  Time Out: 3:00pm    PT Evaluation Completed? Yes  Discussed Plan of Care with patient: Yes    Driss received 10 minutes of therapeutic exercise & instruction including:    Scap Retract 20x  Doorway Stretch 3x30"    Driss received 0 minutes of manual therapy including:    Written Home Exercises Provided: Yes - Scap Retract, Doorway Str  Driss demo good understanding of the education provided. Patient demo good return demo of skill of exercises.    Assessment     Patient presents to Physical Therapy Evaluation with " diagnosis of L Shoulder Pain, with signs and symptoms including: increased anterior/lateral L GHJ pain, decreased L UE strength, decreased L shoulder ROM, scapular dyskinesis, postural imbalance, muscle imbalance, and decreased tolerance to functional activities. Pt with majority of pain provoked in L anterior GHJ consistent with impingement of subacromial structures, with occasional lateral shoulder pain at supraspinatus insertion. Pt with mild postural deficits with forward head and rounded shoulders contributing to muscular imbalance and subacromial impingement of soft tissue structures. Pt with appropriate L glenohumeral joint mobility, with no significant deficits in glides in either direction. Pt with no sensory abnormalities throughout entire treatment session. Pt with signs and symptoms consistent with subacromial impingement of L UE. Pt with good motivation to perform physical activity and responds well to cueing.    Pt prognosis is Good.  Pt will benefit from skilled outpatient physical therapy to address the above stated deficits, provide pt/family education and to maximize pt's level of independence.       Medical necessity is demonstrated by the following IMPAIRMENTS/PROBLEMS:  weakness, impaired endurance, impaired self care skills, impaired functional mobility, decreased coordination, decreased upper extremity function, pain, abnormal tone, decreased ROM, impaired joint extensibility and impaired muscle length      History  Co-morbidities and personal factors that may impact the plan of care Examination  Body Structures and Functions, activity limitations and participation restrictions that may impact the plan of care Clinical Presentation   Decision Making/ Complexity Score   Co-morbidities:       T2DM          Personal Factors:    Body Regions: BUE, trunk/core     Body Systems: musculoskeletal (ROM, strength, endurance, flexibility, gait); neuromuscular (balance, posture, motor control,  coordination)          Activity limitations: reaching backwards, lifting, carrying, pushing/pulling, reaching overhead      Participation Restrictions: exercising, heavy household activities, driving         Stable, uncomplicated   Low Complexity    FOTO Shoulder Survey  Score: 27% Limitation        Pt's spiritual, cultural and educational needs considered and pt agreeable to plan of care and goals as stated below:     Short Term GOALS: 4 weeks. Pt agrees with goals set.  1. Patient demonstrates independence with HEP.   2. Patient demonstrates independence with Postural Awareness.   3. Pt will demonstrate ability to reach L UE to shoulder-height shelf, no symptom provocation, appropriate movement pattern    4. Patient will report pain of 4/10 at worst, on 0-10 pain scale, with all activity  5. Pt will increase strength to 1/3 muscle grade or greater to improve tolerance to all functional activities pain free.     Long Term Goals 8 Weeks. Pt agrees with goals set:  1. Pt will increase ROM to WNL to improve functional reach pain free.    2. Pt will increase strength to 4+/5 or greater to improve tolerance to all functional activities pain free.   3. Pt demonstrates improved function per FOTO Shoulder Survey to 20% Limitation or less.   4. Patient will report pain of 2/10 at worst, on 0-10 pain scale, with all activity  5. Pt will demonstrate ability to reach L UE to over-head shelf, no symptom provocation, appropriate movement pattern     Functional Limitations Reports - G Codes  Category: Mobility  Tool: FOTO Shoulder Survey  Score: 27% Limitation    TEST SCORE  Modifier  Impairment Limitation Restriction    0  CH  0 % impaired, limited or restricted   1-23  CI  @ least 1% but less than 20% impaired, limited or restricted   24-47  CJ  @ least 20%<40% impaired, limited or restricted   48-71  CK  @ least 40%<60% impaired, limited or restricted   72-95  CL  @ least 60% <80% impaired, limited or restricted     CM   @ least 80%<100% impaired limited or restricted   120  CN  100% impaired, limited or restricted     Current ():  CJ = 20-40% Limitation  Goal (): CI = 1-20% Limitation    Plan     Certification Period: 8/22/17 - 10/22/17    Outpatient physical therapy 1-2 times weekly to include: pt ed, hep, therapeutic exercises, neuromuscular re-education/ balance exercises, joint mobilizations, aquatic therapy and modalities prn. Cont PT for  6-8 weeks. Pt may be seen by PTA as part of the rehabilitation team.    Therapist: Akhil Guajardo, PT  8/22/2017

## 2017-08-23 ENCOUNTER — HOSPITAL ENCOUNTER (OUTPATIENT)
Dept: RADIOLOGY | Facility: HOSPITAL | Age: 67
Discharge: HOME | End: 2017-08-23
Attending: INTERNAL MEDICINE
Payer: MEDICARE

## 2017-08-23 DIAGNOSIS — M54.31 RIGHT SIDED SCIATICA: ICD-10-CM

## 2017-08-23 PROCEDURE — 72148 MRI LUMBAR SPINE W/O DYE: CPT | Mod: TC

## 2017-08-23 PROCEDURE — 72148 MRI LUMBAR SPINE W/O DYE: CPT | Mod: 26,,, | Performed by: RADIOLOGY

## 2017-08-24 ENCOUNTER — TELEPHONE (OUTPATIENT)
Dept: OPHTHALMOLOGY | Facility: CLINIC | Age: 67
End: 2017-08-24

## 2017-08-24 ENCOUNTER — INITIAL CONSULT (OUTPATIENT)
Dept: OPHTHALMOLOGY | Facility: CLINIC | Age: 67
End: 2017-08-24
Payer: MEDICARE

## 2017-08-24 DIAGNOSIS — H25.13 NUCLEAR SCLEROSIS, BILATERAL: Primary | ICD-10-CM

## 2017-08-24 DIAGNOSIS — H52.7 REFRACTIVE ERROR: ICD-10-CM

## 2017-08-24 DIAGNOSIS — H25.041 POSTERIOR SUBCAPSULAR POLAR SENILE CATARACT, RIGHT: Primary | ICD-10-CM

## 2017-08-24 DIAGNOSIS — I10 ESSENTIAL HYPERTENSION: ICD-10-CM

## 2017-08-24 DIAGNOSIS — E11.9 DM TYPE 2 WITHOUT RETINOPATHY: ICD-10-CM

## 2017-08-24 DIAGNOSIS — H25.043 POSTERIOR SUBCAPSULAR AGE-RELATED CATARACT OF BOTH EYES: ICD-10-CM

## 2017-08-24 PROCEDURE — 99999 PR PBB SHADOW E&M-EST. PATIENT-LVL II: CPT | Mod: PBBFAC,,, | Performed by: OPHTHALMOLOGY

## 2017-08-24 PROCEDURE — 92014 COMPRE OPH EXAM EST PT 1/>: CPT | Mod: S$GLB,,, | Performed by: OPHTHALMOLOGY

## 2017-08-24 PROCEDURE — 99499 UNLISTED E&M SERVICE: CPT | Mod: S$GLB,,, | Performed by: OPHTHALMOLOGY

## 2017-08-24 PROCEDURE — 92136 OPHTHALMIC BIOMETRY: CPT | Mod: RT,S$GLB,, | Performed by: OPHTHALMOLOGY

## 2017-08-24 RX ORDER — CLINDAMYCIN HYDROCHLORIDE 150 MG/1
150 CAPSULE ORAL 3 TIMES DAILY
Qty: 30 CAPSULE | Refills: 1 | Status: SHIPPED | OUTPATIENT
Start: 2017-08-24 | End: 2017-08-28 | Stop reason: ALTCHOICE

## 2017-08-24 RX ORDER — NEPAFENAC 3 MG/ML
1 SUSPENSION/ DROPS OPHTHALMIC DAILY
Qty: 3 ML | Refills: 1 | Status: SHIPPED | OUTPATIENT
Start: 2017-08-26 | End: 2017-09-25

## 2017-08-24 RX ORDER — OFLOXACIN 3 MG/ML
1 SOLUTION/ DROPS OPHTHALMIC 4 TIMES DAILY
Qty: 5 ML | Refills: 1 | Status: SHIPPED | OUTPATIENT
Start: 2017-08-26 | End: 2017-09-05

## 2017-08-24 RX ORDER — DIFLUPREDNATE OPHTHALMIC 0.5 MG/ML
1 EMULSION OPHTHALMIC 4 TIMES DAILY
Qty: 5 ML | Refills: 1 | Status: SHIPPED | OUTPATIENT
Start: 2017-08-29 | End: 2017-09-28

## 2017-08-24 NOTE — TELEPHONE ENCOUNTER
----- Message from Dionne Perales sent at 8/24/2017  3:23 PM CDT -----  Contact: Misa Hernandez (Spouse)  Mrs. Bynum would like to speak with the nurse in regards to pt's upcoming surgery. She can be reached at [phone]

## 2017-08-24 NOTE — LETTER
August 24, 2017      Iva Orantes, OD  [address]  [place] [address]           [facility]  [address]  [place] [address]  Phone: [phone]  Fax: [phone]          Patient: Driss Hernandez Jr.   MR Number: [id number]   YOB: 1950   Date of Visit: 8/24/2017       Dear Dr. Iva Orantes:    Thank you for referring Driss Hernandez to me for evaluation. Attached you will find relevant portions of my assessment and plan of care.    If you have questions, please do not hesitate to call me. I look forward to following Driss Hernandez along with you.    Sincerely,    Nickolas Hong MD    Enclosure  CC:  No Recipients    If you would like to receive this communication electronically, please contact [email or web address] or [phone] to request more information on [employer] access.    For providers and/or their staff who would like to refer a patient to [facility], please contact us through our one-stop-shop provider referral line, [facility] [place], at [phone].    If you feel you have received this communication in error or would no longer like to receive these types of communications, please e-mail [email or web address]

## 2017-08-24 NOTE — PROGRESS NOTES
Subjective:       Patient ID: Driss Hernandez Jr. is a 66 y.o. male.    Chief Complaint: Cataract (Eval Per )    HPI  Review of Systems    Objective:      Physical Exam    Assessment:       1. Nuclear sclerosis, bilateral    2. Posterior subcapsular age-related cataract of both eyes    3. DM type 2 without retinopathy    4. Essential hypertension    5. Refractive error        Plan:       Visually significant cataract OD -Pt. Wants Sx.     Cataract OS- Not visually significant.  DM-No NPDR OU.  HTN-No retinopathy OU.  RE      CE OD 8/29/17 SN60WF 22.5.  Control DM & HTN.

## 2017-08-25 ENCOUNTER — TELEPHONE (OUTPATIENT)
Dept: OPHTHALMOLOGY | Facility: CLINIC | Age: 67
End: 2017-08-25

## 2017-08-25 ENCOUNTER — CLINICAL SUPPORT (OUTPATIENT)
Dept: REHABILITATION | Facility: HOSPITAL | Age: 67
End: 2017-08-25
Attending: INTERNAL MEDICINE
Payer: MEDICARE

## 2017-08-25 DIAGNOSIS — G89.29 CHRONIC LEFT SHOULDER PAIN: Primary | ICD-10-CM

## 2017-08-25 DIAGNOSIS — M25.512 CHRONIC LEFT SHOULDER PAIN: Primary | ICD-10-CM

## 2017-08-25 DIAGNOSIS — R29.898 DECREASED STRENGTH OF UPPER EXTREMITY: ICD-10-CM

## 2017-08-25 DIAGNOSIS — R29.3 POSTURE IMBALANCE: ICD-10-CM

## 2017-08-25 DIAGNOSIS — M25.619 DECREASED RANGE OF MOTION (ROM) OF SHOULDER: ICD-10-CM

## 2017-08-25 PROCEDURE — 97110 THERAPEUTIC EXERCISES: CPT | Mod: PN

## 2017-08-25 NOTE — PROGRESS NOTES
"                                                    Physical Therapy Daily Note     Name: Driss Hernandez Jr.  Clinic Number: [id number]  Diagnosis:   Encounter Diagnoses   Name Primary?    Chronic left shoulder pain Yes    Decreased strength of upper extremity     Decreased range of motion (ROM) of shoulder     Posture imbalance      Physician: Jono Willoughby MD     Visit #: 2 of 20  MAYEN: 12/31/17    Time In: 1:25pm  Time Out: 2:30pm  Total Treatment Time 1:1: 60 min (1:1 with PT 30 mins)    Subjective     Pt reports: His L shoulder is feeling improved overall. He has incorporated HEP into his daily exercise regimen.  Pain Scale: Driss rates pain on a scale of 0-10 to be 1 currently.    Objective     Driss received individual therapeutic exercises to develop strength, endurance, ROM, flexibility, posture and core stabilization for 60 minutes including:      UBE 3'/3'  Pulleys Flex/Abd 2'/2'  Scap Retract 20x  Doorway Stretch 3x30"  Shld Row c/ RTB 3x10  Shld Ext c/ RTB 3x10  Shld IR/ER c/ RTB 3x10  UT Stretch 3x30"    Serratus Punch c/ Red Wand 3x10  Prone Row 2# 2x15  Prone I, T, Y 2x10 ea  Shld ER Horiz Abd "No Money" c/ RTB 3x10  Scapular Clock c/ RTB 5x  SL ER  SL Abd    Driss received the following manual therapy techniques: Joint mobilizations and Soft tissue Mobilization were applied to the: L Shoulder for 0 minutes including:    Written Home Exercises Provided: Yes - Shld Row, Ext, IR, ER, Prone Row  Pt demo good understanding of the education provided. Driss demonstrated good return demonstration of activities.     PT/PTA face to face conference performed during this session    Assessment     Patient tolerated treatment session very well. Pt with no exacerbation of anterior/lateral L shoulder pain throughout entire treatment session. Pt with mild discomfort experienced during L shoulder resisted ER strength activities due to muscle fatigue, discomfort resolved with cessation of activity. Pt with easily " achieved therapeutic effect with upper trap stretch due to muscular and postural imbalance.    This is a 66 y.o. male referred to outpatient physical therapy and presents with a medical diagnosis of L shoulder pain and demonstrates limitations as described in the problem list. Pt prognosis is Good. Pt will continue to benefit from skilled outpatient physical therapy to address the deficits listed in the problem list, provide pt/family education and to maximize pt's level of independence in the home and community environment.     Goals as follows:  Short Term GOALS: 4 weeks. Pt agrees with goals set.  1. Patient demonstrates independence with HEP.   2. Patient demonstrates independence with Postural Awareness.   3. Pt will demonstrate ability to reach L UE to shoulder-height shelf, no symptom provocation, appropriate movement pattern    4. Patient will report pain of 4/10 at worst, on 0-10 pain scale, with all activity  5. Pt will increase strength to 1/3 muscle grade or greater to improve tolerance to all functional activities pain free.      Plan     Certification Period: 8/22/17 - 10/22/17    Continue with established Plan of Care towards PT goals.    Therapist: Akhil Guajardo, PT  8/25/2017

## 2017-08-27 NOTE — H&P
[facility]  History & Physical    Subjective:      Chief Complaint/Reason for Admission:     Driss Hernandez Jr. is a 66 y.o. male.    Past Medical History:   Diagnosis Date    Diabetes mellitus with neurological manifestations, uncontrolled 1/24/2017    Diabetic polyneuropathy associated with type 2 diabetes mellitus 1/24/2017    Essential hypertension 1/24/2017    Gastroesophageal reflux disease 1/24/2017    Hyperlipidemia 1/24/2017    Insomnia 1/24/2017    Long-term insulin use 1/24/2017    Nuclear sclerosis of both eyes 8/24/2017    Obesity     Uncontrolled type 2 diabetes mellitus without complication, with long-term current use of insulin 1/24/2017     Past Surgical History:   Procedure Laterality Date    BACK SURGERY      2002    COLONOSCOPY N/A 2/21/2017    Procedure: COLONOSCOPY;  Surgeon: Robb Rosado MD;  Location: [facility];  Service: Endoscopy;  Laterality: N/A;    CORONARY ARTERY BYPASS GRAFT      March 2016    TONSILLECTOMY       Family History   Problem Relation Age of Onset    Depression Mother     Heart disease Mother     Stroke Father     No Known Problems Sister     Blindness Brother     Diabetes Sister     No Known Problems Sister     No Known Problems Maternal Aunt     No Known Problems Maternal Uncle     No Known Problems Paternal Aunt     No Known Problems Paternal Uncle     No Known Problems Maternal Grandmother     No Known Problems Maternal Grandfather     No Known Problems Paternal Grandmother     No Known Problems Paternal Grandfather     Amblyopia Neg Hx     Cancer Neg Hx     Cataracts Neg Hx     Glaucoma Neg Hx     Hypertension Neg Hx     Macular degeneration Neg Hx     Retinal detachment Neg Hx     Strabismus Neg Hx     Thyroid disease Neg Hx      Social History   Substance Use Topics    Smoking status: Never Smoker    Smokeless tobacco: Never Used    Alcohol use No       No prescriptions prior to admission.     Review  of patient's allergies indicates:   Allergen Reactions    Penicillins         Review of Systems   Eyes: Positive for blurred vision.   All other systems reviewed and are negative.      Objective:      Vital Signs (Most Recent)       Vital Signs Range (Last 24H):  BP: ()/()   Arterial Line BP: ()/()     Physical Exam   Constitutional: He is oriented to person, place, and time. He appears well-developed and well-nourished.   HENT:   Head: Normocephalic.   Eyes: Conjunctivae and EOM are normal. Pupils are equal, round, and reactive to light.   Neck: Normal range of motion. Neck supple.   Cardiovascular: Normal rate.    Pulmonary/Chest: Effort normal and breath sounds normal.   Abdominal: Soft. Bowel sounds are normal.   Musculoskeletal: Normal range of motion.   Neurological: He is alert and oriented to person, place, and time.   Skin: Skin is warm.   Psychiatric: He has a normal mood and affect.       Data Review:    ECG:     Assessment:      Cataract OD.    Plan:    CE OD.

## 2017-08-28 NOTE — PRE-PROCEDURE INSTRUCTIONS
Spoke with Patient and Patient's Wife - Ada.  NPO, medication, and pre-op instructions reviewed.  Denies previous problems with Anesthesia.  Stated that he was dissatisfied with the Surgeon's office because he did not receive his pre-op instructions in the mail.  Verified his pre-op eye drops and their frequency with Flex (Dr. Hong).  Stated that his average morning glucose reading is 160.  Offered to send pre-op instructions via fax or e-mail.  Stated that he did not need the information now.  Both verbalized understanding of instructions.

## 2017-08-29 ENCOUNTER — HOSPITAL ENCOUNTER (OUTPATIENT)
Facility: HOSPITAL | Age: 67
Discharge: HOME | End: 2017-08-29
Attending: OPHTHALMOLOGY | Admitting: OPHTHALMOLOGY
Payer: MEDICARE

## 2017-08-29 ENCOUNTER — ANESTHESIA EVENT (OUTPATIENT)
Dept: SURGERY | Facility: HOSPITAL | Age: 67
End: 2017-08-29
Payer: MEDICARE

## 2017-08-29 ENCOUNTER — ANESTHESIA (OUTPATIENT)
Dept: SURGERY | Facility: HOSPITAL | Age: 67
End: 2017-08-29
Payer: MEDICARE

## 2017-08-29 ENCOUNTER — SURGERY (OUTPATIENT)
Age: 67
End: 2017-08-29

## 2017-08-29 VITALS
WEIGHT: 195 LBS | DIASTOLIC BLOOD PRESSURE: 64 MMHG | HEIGHT: 67 IN | SYSTOLIC BLOOD PRESSURE: 105 MMHG | HEART RATE: 60 BPM | TEMPERATURE: 99 F | BODY MASS INDEX: 30.61 KG/M2 | RESPIRATION RATE: 20 BRPM | OXYGEN SATURATION: 99 %

## 2017-08-29 DIAGNOSIS — H25.10 SENILE NUCLEAR SCLEROSIS: ICD-10-CM

## 2017-08-29 LAB
POCT GLUCOSE: 136 MG/DL (ref 70–110)
POCT GLUCOSE: 154 MG/DL (ref 70–110)

## 2017-08-29 PROCEDURE — V2632 POST CHMBR INTRAOCULAR LENS: HCPCS | Performed by: OPHTHALMOLOGY

## 2017-08-29 PROCEDURE — 25000003 PHARM REV CODE 250: Performed by: NURSE ANESTHETIST, CERTIFIED REGISTERED

## 2017-08-29 PROCEDURE — 63600175 PHARM REV CODE 636 W HCPCS: Performed by: NURSE ANESTHETIST, CERTIFIED REGISTERED

## 2017-08-29 PROCEDURE — 37000009 HC ANESTHESIA EA ADD 15 MINS: Performed by: OPHTHALMOLOGY

## 2017-08-29 PROCEDURE — C9447 INJ, PHENYLEPHRINE KETOROLAC: HCPCS | Performed by: OPHTHALMOLOGY

## 2017-08-29 PROCEDURE — 36000706: Performed by: OPHTHALMOLOGY

## 2017-08-29 PROCEDURE — 27201423 OPTIME MED/SURG SUP & DEVICES STERILE SUPPLY: Performed by: OPHTHALMOLOGY

## 2017-08-29 PROCEDURE — D9220A PRA ANESTHESIA: Mod: CRNA,,, | Performed by: NURSE ANESTHETIST, CERTIFIED REGISTERED

## 2017-08-29 PROCEDURE — 82962 GLUCOSE BLOOD TEST: CPT | Performed by: OPHTHALMOLOGY

## 2017-08-29 PROCEDURE — D9220A PRA ANESTHESIA: Mod: ANES,,, | Performed by: ANESTHESIOLOGY

## 2017-08-29 PROCEDURE — 37000008 HC ANESTHESIA 1ST 15 MINUTES: Performed by: OPHTHALMOLOGY

## 2017-08-29 PROCEDURE — 25000003 PHARM REV CODE 250

## 2017-08-29 PROCEDURE — 36000707: Performed by: OPHTHALMOLOGY

## 2017-08-29 PROCEDURE — 25000003 PHARM REV CODE 250: Performed by: OPHTHALMOLOGY

## 2017-08-29 PROCEDURE — 71000015 HC POSTOP RECOV 1ST HR: Performed by: OPHTHALMOLOGY

## 2017-08-29 PROCEDURE — 66984 XCAPSL CTRC RMVL W/O ECP: CPT | Mod: RT,,, | Performed by: OPHTHALMOLOGY

## 2017-08-29 PROCEDURE — 63600175 PHARM REV CODE 636 W HCPCS: Performed by: OPHTHALMOLOGY

## 2017-08-29 DEVICE — LENS 22.5 ACRYSOF: Type: IMPLANTABLE DEVICE | Site: EYE | Status: FUNCTIONAL

## 2017-08-29 RX ORDER — OFLOXACIN 3 MG/ML
SOLUTION/ DROPS OPHTHALMIC
Status: DISCONTINUED | OUTPATIENT
Start: 2017-08-29 | End: 2017-08-29 | Stop reason: HOSPADM

## 2017-08-29 RX ORDER — PREDNISOLONE ACETATE 10 MG/ML
SUSPENSION/ DROPS OPHTHALMIC
Status: DISCONTINUED | OUTPATIENT
Start: 2017-08-29 | End: 2017-08-29 | Stop reason: HOSPADM

## 2017-08-29 RX ORDER — DIPHENHYDRAMINE HYDROCHLORIDE 50 MG/ML
25 INJECTION INTRAMUSCULAR; INTRAVENOUS EVERY 6 HOURS PRN
Status: DISCONTINUED | OUTPATIENT
Start: 2017-08-29 | End: 2017-08-29 | Stop reason: HOSPADM

## 2017-08-29 RX ORDER — OFLOXACIN 3 MG/ML
SOLUTION/ DROPS OPHTHALMIC
Status: COMPLETED
Start: 2017-08-29 | End: 2017-08-29

## 2017-08-29 RX ORDER — PHENYLEPHRINE HYDROCHLORIDE 25 MG/ML
SOLUTION/ DROPS OPHTHALMIC
Status: COMPLETED
Start: 2017-08-29 | End: 2017-08-29

## 2017-08-29 RX ORDER — CYCLOPENTOLATE HYDROCHLORIDE 10 MG/ML
SOLUTION/ DROPS OPHTHALMIC
Status: COMPLETED
Start: 2017-08-29 | End: 2017-08-29

## 2017-08-29 RX ORDER — HYDROCODONE BITARTRATE AND ACETAMINOPHEN 5; 325 MG/1; MG/1
1 TABLET ORAL EVERY 4 HOURS PRN
Status: DISCONTINUED | OUTPATIENT
Start: 2017-08-29 | End: 2017-08-29 | Stop reason: HOSPADM

## 2017-08-29 RX ORDER — CYCLOPENTOLATE HYDROCHLORIDE 10 MG/ML
1 SOLUTION/ DROPS OPHTHALMIC
Status: COMPLETED | OUTPATIENT
Start: 2017-08-29 | End: 2017-08-29

## 2017-08-29 RX ORDER — PHENYLEPHRINE HYDROCHLORIDE 25 MG/ML
1 SOLUTION/ DROPS OPHTHALMIC
Status: DISCONTINUED | OUTPATIENT
Start: 2017-08-29 | End: 2017-08-29 | Stop reason: HOSPADM

## 2017-08-29 RX ORDER — TROPICAMIDE 10 MG/ML
1 SOLUTION/ DROPS OPHTHALMIC
Status: DISCONTINUED | OUTPATIENT
Start: 2017-08-29 | End: 2017-08-29 | Stop reason: HOSPADM

## 2017-08-29 RX ORDER — MIDAZOLAM HYDROCHLORIDE 1 MG/ML
INJECTION, SOLUTION INTRAMUSCULAR; INTRAVENOUS
Status: DISCONTINUED | OUTPATIENT
Start: 2017-08-29 | End: 2017-08-29

## 2017-08-29 RX ORDER — SODIUM CHLORIDE 9 MG/ML
INJECTION, SOLUTION INTRAVENOUS CONTINUOUS PRN
Status: DISCONTINUED | OUTPATIENT
Start: 2017-08-29 | End: 2017-08-29

## 2017-08-29 RX ORDER — PROPARACAINE HYDROCHLORIDE 5 MG/ML
1 SOLUTION/ DROPS OPHTHALMIC
Status: DISCONTINUED | OUTPATIENT
Start: 2017-08-29 | End: 2017-08-29 | Stop reason: HOSPADM

## 2017-08-29 RX ORDER — LIDOCAINE HYDROCHLORIDE 40 MG/ML
INJECTION, SOLUTION RETROBULBAR
Status: DISCONTINUED | OUTPATIENT
Start: 2017-08-29 | End: 2017-08-29 | Stop reason: HOSPADM

## 2017-08-29 RX ORDER — HYDROCODONE BITARTRATE AND ACETAMINOPHEN 5; 325 MG/1; MG/1
TABLET ORAL
Status: DISCONTINUED
Start: 2017-08-29 | End: 2017-08-29 | Stop reason: HOSPADM

## 2017-08-29 RX ORDER — TROPICAMIDE 10 MG/ML
SOLUTION/ DROPS OPHTHALMIC
Status: COMPLETED
Start: 2017-08-29 | End: 2017-08-29

## 2017-08-29 RX ORDER — OFLOXACIN 3 MG/ML
1 SOLUTION/ DROPS OPHTHALMIC
Status: COMPLETED | OUTPATIENT
Start: 2017-08-29 | End: 2017-08-29

## 2017-08-29 RX ORDER — PREDNISOLONE ACETATE 10 MG/ML
SUSPENSION/ DROPS OPHTHALMIC
Status: DISCONTINUED
Start: 2017-08-29 | End: 2017-08-29 | Stop reason: HOSPADM

## 2017-08-29 RX ORDER — PROPARACAINE HYDROCHLORIDE 5 MG/ML
SOLUTION/ DROPS OPHTHALMIC
Status: COMPLETED
Start: 2017-08-29 | End: 2017-08-29

## 2017-08-29 RX ORDER — LIDOCAINE HYDROCHLORIDE 40 MG/ML
INJECTION, SOLUTION RETROBULBAR
Status: DISCONTINUED
Start: 2017-08-29 | End: 2017-08-29 | Stop reason: HOSPADM

## 2017-08-29 RX ORDER — ACETAMINOPHEN 325 MG/1
650 TABLET ORAL EVERY 4 HOURS PRN
Status: DISCONTINUED | OUTPATIENT
Start: 2017-08-29 | End: 2017-08-29 | Stop reason: HOSPADM

## 2017-08-29 RX ORDER — HYDROMORPHONE HYDROCHLORIDE 1 MG/ML
0.2 INJECTION, SOLUTION INTRAMUSCULAR; INTRAVENOUS; SUBCUTANEOUS EVERY 5 MIN PRN
Status: DISCONTINUED | OUTPATIENT
Start: 2017-08-29 | End: 2017-08-29 | Stop reason: HOSPADM

## 2017-08-29 RX ADMIN — PHENYLEPHRINE HYDROCHLORIDE 1 DROP: 25 SOLUTION/ DROPS OPHTHALMIC at 12:08

## 2017-08-29 RX ADMIN — SODIUM CHONDROITIN SULFATE / SODIUM HYALURONATE 1.05 ML: 0.55-0.5 INJECTION INTRAOCULAR at 12:08

## 2017-08-29 RX ADMIN — TROPICAMIDE 1 DROP: 10 SOLUTION/ DROPS OPHTHALMIC at 12:08

## 2017-08-29 RX ADMIN — SODIUM CHLORIDE: 0.9 INJECTION, SOLUTION INTRAVENOUS at 12:08

## 2017-08-29 RX ADMIN — CYCLOPENTOLATE HYDROCHLORIDE 1 DROP: 10 SOLUTION/ DROPS OPHTHALMIC at 12:08

## 2017-08-29 RX ADMIN — MIDAZOLAM HYDROCHLORIDE 2 MG: 1 INJECTION, SOLUTION INTRAMUSCULAR; INTRAVENOUS at 12:08

## 2017-08-29 RX ADMIN — OFLOXACIN 2 DROP: 3 SOLUTION/ DROPS OPHTHALMIC at 12:08

## 2017-08-29 RX ADMIN — PROPARACAINE HYDROCHLORIDE 1 DROP: 5 SOLUTION/ DROPS OPHTHALMIC at 11:08

## 2017-08-29 RX ADMIN — PREDNISOLONE ACETATE 2 DROP: 10 SUSPENSION/ DROPS OPHTHALMIC at 12:08

## 2017-08-29 RX ADMIN — OFLOXACIN 1 DROP: 3 SOLUTION/ DROPS OPHTHALMIC at 12:08

## 2017-08-29 RX ADMIN — OFLOXACIN 1 DROP: 3 SOLUTION OPHTHALMIC at 12:08

## 2017-08-29 RX ADMIN — LIDOCAINE HYDROCHLORIDE 5 ML: 40 INJECTION, SOLUTION RETROBULBAR; TOPICAL at 12:08

## 2017-08-29 RX ADMIN — PHENYLEPHRINE AND KETOROLAC 4 ML: 10.16; 2.88 INJECTION, SOLUTION, CONCENTRATE INTRAOCULAR at 12:08

## 2017-08-29 RX ADMIN — HYDROCODONE BITARTRATE AND ACETAMINOPHEN 1 TABLET: 5; 325 TABLET ORAL at 01:08

## 2017-08-29 NOTE — TRANSFER OF CARE
"Anesthesia Transfer of Care Note    Patient: Driss Hernandez Jr.    Procedure(s) Performed: Procedure(s) (LRB):  PHACOEMULSIFICATION-ASPIRATION-CATARACT (Right)  INSERTION-INTRAOCULAR LENS (IOL) (Right)    Patient location: PACU    Anesthesia Type: MAC    Transport from OR: Transported from OR on room air with adequate spontaneous ventilation    Post pain: adequate analgesia    Post assessment: no apparent anesthetic complications    Post vital signs: stable    Level of consciousness: awake and alert    Nausea/Vomiting: no nausea/vomiting    Complications: none    Transfer of care protocol was followed      Last vitals:   Visit Vitals  BP (!) 144/76 (BP Location: Right arm, Patient Position: Lying)   Pulse 64   Temp 36.8 °C (98.3 °F) (Oral)   Resp 20   Ht 5' 7" (1.702 m)   Wt 88.5 kg (195 lb)   SpO2 98%   BMI 30.54 kg/m²     "

## 2017-08-29 NOTE — DISCHARGE INSTRUCTIONS
Discharge Instructions for Cataract Surgery  A surgeon removed the cloudy lens in your eye and replaced it with a clear man-made lens. Be sure to have an adult family member or friend drive you home after surgery. Heres what you can expect following surgery and tips for a healthy recovery.  It is normal to have the following:  · Bruised or bloodshot eye for 7 days  · Itching and mild discomfort for several days  · Some fluid discharge  · Sensitivity to light  · Scratchy, sandlike feeling in the eye for 2 weeks  · Feeling tired, especially during the first 24 hours  Activity level  · Do not drive for 2 days or as instructed by your doctor.  · Do not drink alcohol for at least 24 hours.  · Avoid bending at the waist to  objects or lifting anything heavy for 2 days.  · Relax for the first 24 hours after surgery. Watching TV and reading are OK and wont harm your eye.  Eye protection  · Do not rub or press on your eye.  · Sleep on your back or on your unoperated side for 2 nights.  · If instructed, wear a bandage over your eye for 2 days and 2 nights.  · If instructed, wear a shield to protect your eye for 2 days and 2 nights.  Using eyedrops  You may be given special eyedrops or ointment. Here is one way to use eyedrops:  · Tilt your head back.  · Pull your bottom eyelid down.  · Squeeze one drop into your eye. Do not touch your eye with the bottle tip.  · Close your eyes for a few seconds.  · If you need more than one drop, wait a few minutes before adding the next one.   Call your doctor right away if you have any of the following:  · Bleeding or discharge from the eye  · Your vision suddenly becomes worse  · Pain medicine you are told to take does not ease your pain  · Nausea or vomiting  · Chills or fever over 100.4°F (39.1°C)   Date Last Reviewed: 5/30/2015  © 3655-8349 [employer]. [address] [place] [address], [place] [address]. All rights reserved. This information is not intended as a  substitute for professional medical care. Always follow your healthcare professional's instructions.

## 2017-08-29 NOTE — BRIEF OP NOTE
"Operative Note     SUMMARY     Surgery Date: 8/29/2017    Surgeon(s) and Role:      Nickolas Hong MD - Primary    Pre-op Diagnosis:  Nuclear sclerosis     Post-op/ Diagnosis:  Same    Final Diagnosis: Cataract    Procedure(s) (LRB):  PHACOEMULSIFICATION-ASPIRATION-CATARACT   INSERTION-INTRAOCULAR LENS (IOL)     Anesthesia: Monitored Anesthesia Care    Findings/Key Components:  Cataract    Outcome: Tolerated procedure well    Estimated Blood Loss: None         Specimens     None          Follow-up:  Tomorrow in clinic      Discharge Note      SUMMARY     Admit Date: 8/29/2017    Attending Physician: Nickolas Hong MD    Discharge Physician: Nickolas Hong MD    Discharge Date: 8/29/2017    Final Diagnosis: Cataract    Outcome: Tolerated procedure well    Disposition: Discharge to Home.    Medications:       Driss Hernandez Jr.   Home Medication Instructions ALLI:[id number]    Printed on:08/29/17 1313   Medication Information                      ACCU-CHEK MOIZ CONTROL SOLN Soln               ACCU-CHEK MOIZ PLUS TEST STRP Strp               ACCU-CHEK SOFTCLIX LANCETS Misc               aspirin 325 MG tablet  Take 325 mg by mouth every morning.              atorvastatin (LIPITOR) 40 MG tablet  Take 40 mg by mouth every morning.              BD ALCOHOL SWABS PadM               BD INSULIN PEN NEEDLE UF MINI 31 gauge x 3/16" Ndle               difluprednate (DUREZOL) 0.05 % Drop ophthalmic solution  Place 1 drop into the right eye 4 (four) times daily. For 30 days             fenofibrate 160 MG Tab  Take 160 mg by mouth every morning.              gabapentin (NEURONTIN) 100 MG capsule  Take by mouth 2 (two) times daily. Takes two 100 mg capsules in the morning and three 100 mg capsules with dinner             ILEVRO 0.3 % DrpS  Place 1 drop into both eyes once daily. For 30 days             insulin aspart protamine-insulin aspart (NOVOLOG 70/30) 100 unit/mL (70-30) InPn pen  Inject into the skin " 2 (two) times daily before meals.             LANTUS SOLOSTAR 100 unit/mL (3 mL) InPn pen  Inject 13 Units into the skin every morning.              lisinopril (PRINIVIL,ZESTRIL) 40 MG tablet  Take 40 mg by mouth daily with dinner or evening meal.              metformin (GLUCOPHAGE-XR) 500 MG 24 hr tablet  Take 1,000 mg by mouth 2 (two) times daily with meals. Takes two 500 mg twice a day             metoprolol succinate (TOPROL-XL) 25 MG 24 hr tablet  Take 25 mg by mouth daily with dinner or evening meal.              nateglinide (STARLIX) 60 MG tablet  Take 60 mg by mouth 3 (three) times daily before meals.             ofloxacin (OCUFLOX) 0.3 % ophthalmic solution  Place 1 drop into the right eye 4 (four) times daily.             omeprazole (PRILOSEC) 20 MG capsule  Take 20 mg by mouth daily with dinner or evening meal.              sildenafil (REVATIO) 20 mg Tab  1-5 pills at a time per day prn             tizanidine (ZANAFLEX) 4 MG tablet  Take 4 mg by mouth every 6 (six) hours as needed.             triazolam (HALCION) 0.25 MG Tab  TK 1 T PO QHS, prn                   Patient Discharge Instructions:     Keep Krishnamurthy Shield over operated eye when not using drops.     DIET:  Regular    Activity: No heavy lifting or bending X 1 week.    Follow-up:  Tomorrow in clinic

## 2017-08-29 NOTE — ANESTHESIA PREPROCEDURE EVALUATION
08/29/2017  Driss Hernandez Jr. is a 66 y.o., male.    Anesthesia Evaluation         Review of Systems  Anesthesia Hx:  No problems with previous Anesthesia   Social:  Non-Smoker    Cardiovascular:   Exercise tolerance: good Hypertension Denies CAD.   CABG/stent   Denies Angina.  Functional Capacity Can you climb two flights of stairs? ==> Yes    Pulmonary:   Denies Asthma.  Denies Recent URI.  Denies Sleep Apnea.    Renal/:  Renal/ Normal     Hepatic/GI:   Denies PUD. Denies Hiatal Hernia. GERD Denies Liver Disease.  Denies Hepatitis.    Neurological:   Denies CVA. Denies Seizures.    Endocrine:   Diabetes Denies Hypothyroidism.        Physical Exam  General:  Well nourished    Airway/Jaw/Neck:  Airway Findings: Mouth Opening: Normal Tongue: Normal  General Airway Assessment: Adult  Mallampati: II  Improves to II with phonation.  TM Distance: Normal, at least 6 cm  Jaw/Neck Findings:  Neck ROM: Normal ROM  Neck Findings:     Eyes/Ears/Nose:  EYES/EARS/NOSE FINDINGS: Normal   Dental:  Dental Findings: In tact   Chest/Lungs:  Chest/Lungs Findings: Clear to auscultation     Heart/Vascular:  Heart Findings: Rate: Normal  Rhythm: Regular Rhythm  Sounds: Normal        Mental Status:  Mental Status Findings:  Alert and Oriented         Anesthesia Plan  Type of Anesthesia, risks & benefits discussed:  Anesthesia Type:  general, MAC  Patient's Preference: Proceed with anesthesia understanding that the risks are very small but could be serious or life threatening.  Intra-op Monitoring Plan: standard ASA monitors  Intra-op Monitoring Plan Comments:   Post Op Pain Control Plan:   Post Op Pain Control Plan Comments:   Induction:   IV  Beta Blocker:  Patient is not currently on a Beta-Blocker (No further documentation required).       Informed Consent: Patient understands risks and agrees with Anesthesia plan.   Questions answered. Anesthesia consent signed with patient.  ASA Score: 3     Day of Surgery Review of History & Physical: I have interviewed and examined the patient. I have reviewed the patient's H&P dated:            Ready For Surgery From Anesthesia Perspective.

## 2017-08-29 NOTE — PLAN OF CARE
Problem: Patient Care Overview  Goal: Plan of Care Review  Outcome: Outcome(s) achieved Date Met: 08/29/17    Discharge instructions given to patient and spouse. Verbalized understanding. Patient stable, tolerating fluids. No complaints at this time. Patient adequate for discharge.

## 2017-08-30 ENCOUNTER — OFFICE VISIT (OUTPATIENT)
Dept: OPHTHALMOLOGY | Facility: CLINIC | Age: 67
End: 2017-08-30
Payer: MEDICARE

## 2017-08-30 VITALS — DIASTOLIC BLOOD PRESSURE: 71 MMHG | SYSTOLIC BLOOD PRESSURE: 127 MMHG

## 2017-08-30 DIAGNOSIS — Z98.890 POST-OPERATIVE STATE: Primary | ICD-10-CM

## 2017-08-30 PROCEDURE — 99999 PR PBB SHADOW E&M-EST. PATIENT-LVL II: CPT | Mod: PBBFAC,,, | Performed by: OPHTHALMOLOGY

## 2017-08-30 PROCEDURE — 99024 POSTOP FOLLOW-UP VISIT: CPT | Mod: S$GLB,,, | Performed by: OPHTHALMOLOGY

## 2017-08-30 NOTE — PROGRESS NOTES
Subjective:       Patient ID: Driss Hernandez Jr. is a 66 y.o. male.    Chief Complaint: Post-op Evaluation    HPI  Review of Systems    Objective:      Physical Exam    Assessment:       1. Post-operative state        Plan:       S/p CE OD- Doing well.             CPM OD.  RTC 1 wk.

## 2017-08-31 ENCOUNTER — TELEPHONE (OUTPATIENT)
Dept: OPHTHALMOLOGY | Facility: CLINIC | Age: 67
End: 2017-08-31

## 2017-08-31 ENCOUNTER — CLINICAL SUPPORT (OUTPATIENT)
Dept: REHABILITATION | Facility: HOSPITAL | Age: 67
End: 2017-08-31
Attending: INTERNAL MEDICINE
Payer: MEDICARE

## 2017-08-31 ENCOUNTER — TELEPHONE (OUTPATIENT)
Dept: FAMILY MEDICINE | Facility: CLINIC | Age: 67
End: 2017-08-31

## 2017-08-31 DIAGNOSIS — R29.898 DECREASED STRENGTH OF UPPER EXTREMITY: ICD-10-CM

## 2017-08-31 DIAGNOSIS — M25.512 CHRONIC LEFT SHOULDER PAIN: Primary | ICD-10-CM

## 2017-08-31 DIAGNOSIS — M25.619 DECREASED RANGE OF MOTION (ROM) OF SHOULDER: ICD-10-CM

## 2017-08-31 DIAGNOSIS — G89.29 CHRONIC LEFT SHOULDER PAIN: Primary | ICD-10-CM

## 2017-08-31 DIAGNOSIS — R29.3 POSTURE IMBALANCE: ICD-10-CM

## 2017-08-31 PROCEDURE — 97110 THERAPEUTIC EXERCISES: CPT | Mod: PN

## 2017-08-31 NOTE — TELEPHONE ENCOUNTER
----- Message from Diego Suarez sent at 8/31/2017  1:25 PM CDT -----  Contact: Wife  Called regarding MRI results and pain management. Wife can be reached @ [phone].

## 2017-08-31 NOTE — TELEPHONE ENCOUNTER
MRI does show a bulging disks in the right does appear to be touching the nerve and may well explain your symptoms.  Next test step would likely to see someone in the spine clinic/pain management to be evaluated for an injection  Message back if agreeable    Patient is agreeable and please put in the order.

## 2017-08-31 NOTE — PROGRESS NOTES
"                                                    Physical Therapy Daily Note     Name: Driss Hernandez Jr.  Clinic Number: [id number]  Diagnosis:   Encounter Diagnoses   Name Primary?    Chronic left shoulder pain Yes    Decreased strength of upper extremity     Decreased range of motion (ROM) of shoulder     Posture imbalance      Physician: Jono Willoughby MD     Visit #: 3 of 20  MAYEN: 12/31/17    Time In: 2:30pm  Time Out: 3:30pm  Total Treatment Time 1:1: 60 min (1:1 with PT 30 mins)    Subjective     Pt reports: He reports experiencing "tunnel vision" in R eye following eye procedure. He had mild discomfort while stretching into full R GHJ ER/flexion.  Pain Scale: Driss rates pain on a scale of 0-10 to be 1 currently.    Objective     Driss received individual therapeutic exercises to develop strength, endurance, ROM, flexibility, posture and core stabilization for 60 minutes including:    UBE 3'/3' Lvl 3  Pulleys Flex/Abd 2'/2'  Scap Retract 20x  Doorway Stretch 3x30"  Shld IR Stretch c/ strap 5x20"  Shld ER stretch c/ wand 5x20"    Shld Row c/ GTB 3x10  Shld Ext c/ GTB 3x10  Shld IR/ER c/ RTB 3x10  UT Stretch 3x30"    Serratus Punch c/ Red Wand 3x10  Prone Row 2# 2x15 - NP  Prone I, T, Y 2x10 ea - NP  Shld ER Horiz Abd "No Money" c/ RTB 3x10  Scapular Clock c/ RTB 5x  SL ER 2x10 2#  SL Abd 2x10 2#    Driss received the following manual therapy techniques: Joint mobilizations and Soft tissue Mobilization were applied to the: L Shoulder for 0 minutes including:    Written Home Exercises Provided: Yes - Shld Row, Ext, IR, ER, Prone Row  Pt demo good understanding of the education provided. Driss demonstrated good return demonstration of activities.     PT/PTA face to face conference performed during this session    Assessment     Patient tolerated treatment session very well. Pt with easily achieved therapeutic effect with active-assisted shoulder IR/ER range of motion flexibility training due to restricted " soft tissue. Pt with improving UE strength and endurance to perform strength/endurance training with increased resistance this session; good maintenance of exercise form. Pt with no significant deficits in performance following vision procedure.    This is a 66 y.o. male referred to outpatient physical therapy and presents with a medical diagnosis of L shoulder pain and demonstrates limitations as described in the problem list. Pt prognosis is Good. Pt will continue to benefit from skilled outpatient physical therapy to address the deficits listed in the problem list, provide pt/family education and to maximize pt's level of independence in the home and community environment.     Goals as follows:  Short Term GOALS: 4 weeks. Pt agrees with goals set.  1. Patient demonstrates independence with HEP.   2. Patient demonstrates independence with Postural Awareness.   3. Pt will demonstrate ability to reach L UE to shoulder-height shelf, no symptom provocation, appropriate movement pattern    4. Patient will report pain of 4/10 at worst, on 0-10 pain scale, with all activity  5. Pt will increase strength to 1/3 muscle grade or greater to improve tolerance to all functional activities pain free.      Plan     Certification Period: 8/22/17 - 10/22/17    Continue with established Plan of Care towards PT goals.    Therapist: Akhil Guajardo, PT  8/31/2017

## 2017-09-05 ENCOUNTER — LAB VISIT (OUTPATIENT)
Dept: LAB | Facility: HOSPITAL | Age: 67
End: 2017-09-05
Payer: MEDICARE

## 2017-09-05 DIAGNOSIS — E78.5 HYPERLIPIDEMIA: ICD-10-CM

## 2017-09-05 LAB
ALBUMIN SERPL BCP-MCNC: 4 G/DL
ALP SERPL-CCNC: 29 U/L
ALT SERPL W/O P-5'-P-CCNC: 31 U/L
ANION GAP SERPL CALC-SCNC: 7 MMOL/L
AST SERPL-CCNC: 27 U/L
BILIRUB SERPL-MCNC: 0.5 MG/DL
BUN SERPL-MCNC: 33 MG/DL
CALCIUM SERPL-MCNC: 9.9 MG/DL
CHLORIDE SERPL-SCNC: 107 MMOL/L
CHOLEST SERPL-MCNC: 167 MG/DL
CHOLEST/HDLC SERPL: 5.6 {RATIO}
CO2 SERPL-SCNC: 24 MMOL/L
CREAT SERPL-MCNC: 1.5 MG/DL
EST. GFR  (AFRICAN AMERICAN): 54.9 ML/MIN/1.73 M^2
EST. GFR  (NON AFRICAN AMERICAN): 47.5 ML/MIN/1.73 M^2
ESTIMATED AVG GLUCOSE: 174 MG/DL
GLUCOSE SERPL-MCNC: 146 MG/DL
HBA1C MFR BLD HPLC: 7.7 %
HDLC SERPL-MCNC: 30 MG/DL
HDLC SERPL: 18 %
LDLC SERPL CALC-MCNC: 99.8 MG/DL
NONHDLC SERPL-MCNC: 137 MG/DL
POTASSIUM SERPL-SCNC: 5.6 MMOL/L
PROT SERPL-MCNC: 7.6 G/DL
SODIUM SERPL-SCNC: 138 MMOL/L
TRIGL SERPL-MCNC: 186 MG/DL

## 2017-09-05 PROCEDURE — 80053 COMPREHEN METABOLIC PANEL: CPT

## 2017-09-05 PROCEDURE — 83036 HEMOGLOBIN GLYCOSYLATED A1C: CPT

## 2017-09-05 PROCEDURE — 80061 LIPID PANEL: CPT

## 2017-09-05 PROCEDURE — 36415 COLL VENOUS BLD VENIPUNCTURE: CPT | Mod: PO

## 2017-09-06 ENCOUNTER — CLINICAL SUPPORT (OUTPATIENT)
Dept: REHABILITATION | Facility: HOSPITAL | Age: 67
End: 2017-09-06
Attending: INTERNAL MEDICINE
Payer: MEDICARE

## 2017-09-06 DIAGNOSIS — G89.29 CHRONIC LEFT SHOULDER PAIN: Primary | ICD-10-CM

## 2017-09-06 DIAGNOSIS — R29.3 POSTURE IMBALANCE: ICD-10-CM

## 2017-09-06 DIAGNOSIS — R29.898 DECREASED STRENGTH OF UPPER EXTREMITY: ICD-10-CM

## 2017-09-06 DIAGNOSIS — M25.619 DECREASED RANGE OF MOTION (ROM) OF SHOULDER: ICD-10-CM

## 2017-09-06 DIAGNOSIS — M25.512 CHRONIC LEFT SHOULDER PAIN: Primary | ICD-10-CM

## 2017-09-06 PROCEDURE — 97110 THERAPEUTIC EXERCISES: CPT | Mod: PN

## 2017-09-06 NOTE — PROGRESS NOTES
"                                                    Physical Therapy Daily Note     Name: Driss Hernandez Jr.  Clinic Number: [id number]  Diagnosis:   Encounter Diagnoses   Name Primary?    Chronic left shoulder pain Yes    Decreased strength of upper extremity     Decreased range of motion (ROM) of shoulder     Posture imbalance      Physician: Jono Willoughby MD     Visit #: 4 of 20  MAYEN: 12/31/17    Time In: 930  Time Out: 1030  Total Treatment Time 1:1: 60 min (1:1 with PT 30 mins)    Subjective     Pt reports: Yesterday he had increased pain with gym activity (UE and abdominal strength/endurance training)  Pain Scale: Driss rates pain on a scale of 0-10 to be 1 currently.    Objective     Driss received individual therapeutic exercises to develop strength, endurance, ROM, flexibility, posture and core stabilization for 60 minutes including:    UBE 3'/3' Lvl 3  Doorway Stretch 3x30"  Pulleys Flex/Abd 2'/2'  Scap Retract 15x  Shld IR Stretch c/ strap 5x20"  Shld ER stretch c/ wand 5x20"    Scapular Clock c/ RTB 7x  Shld Row c/ GTB 3x10  Shld Ext c/ GTB 3x10  Shld IR/ER c/ RTB 3x10  UT Stretch 3x30"    Supine 1/2 Foam 3 min  Serratus Punch c/ Red Wand 3x10  Prone Row 2# 2x15 - NP  Prone I, T, Y 2x10 ea  Shld ER Horiz Abd "No Money" c/ RTB 3x10  SL ER 2x10 2#  SL Abd 2x10     Driss received the following manual therapy techniques: Joint mobilizations and Soft tissue Mobilization were applied to the: L Shoulder for 0 minutes including:    Written Home Exercises Provided: Yes - Shld Row, Ext, IR, ER, Prone Row  Pt demo good understanding of the education provided. Driss demonstrated good return demonstration of activities.     PT/PTA face to face conference performed during this session    Assessment     Patient tolerated treatment session very well. Ability to progress to 90/90 deg abd/ER with doorway stretch for increased tissue stretch in pec and anterior shoulder/cervical musculature. Pt progressing " appropriately with suni-scapular strengthening without symptom provocation throughout treatment session. Pt steadily improving active-assisted IR stretch with strap due to improving overall joint and tissue mobility.    This is a 66 y.o. male referred to outpatient physical therapy and presents with a medical diagnosis of L shoulder pain and demonstrates limitations as described in the problem list. Pt prognosis is Good. Pt will continue to benefit from skilled outpatient physical therapy to address the deficits listed in the problem list, provide pt/family education and to maximize pt's level of independence in the home and community environment.     Goals as follows:  Short Term GOALS: 4 weeks. Pt agrees with goals set.  1. Patient demonstrates independence with HEP.   2. Patient demonstrates independence with Postural Awareness.   3. Pt will demonstrate ability to reach L UE to shoulder-height shelf, no symptom provocation, appropriate movement pattern    4. Patient will report pain of 4/10 at worst, on 0-10 pain scale, with all activity  5. Pt will increase strength to 1/3 muscle grade or greater to improve tolerance to all functional activities pain free.      Plan     Certification Period: 8/22/17 - 10/22/17    Continue with established Plan of Care towards PT goals.    Therapist: Akhil Guajardo, PT  9/6/2017

## 2017-09-07 ENCOUNTER — PATIENT MESSAGE (OUTPATIENT)
Dept: FAMILY MEDICINE | Facility: CLINIC | Age: 67
End: 2017-09-07

## 2017-09-07 ENCOUNTER — OFFICE VISIT (OUTPATIENT)
Dept: OPHTHALMOLOGY | Facility: CLINIC | Age: 67
End: 2017-09-07
Payer: MEDICARE

## 2017-09-07 DIAGNOSIS — Z98.890 POST-OPERATIVE STATE: Primary | ICD-10-CM

## 2017-09-07 DIAGNOSIS — M54.31 RIGHT SIDED SCIATICA: Primary | ICD-10-CM

## 2017-09-07 DIAGNOSIS — H25.12 NUCLEAR SCLEROSIS, LEFT: ICD-10-CM

## 2017-09-07 PROCEDURE — 99024 POSTOP FOLLOW-UP VISIT: CPT | Mod: S$GLB,,, | Performed by: OPHTHALMOLOGY

## 2017-09-07 PROCEDURE — 99999 PR PBB SHADOW E&M-EST. PATIENT-LVL II: CPT | Mod: PBBFAC,,, | Performed by: OPHTHALMOLOGY

## 2017-09-07 NOTE — PROGRESS NOTES
Subjective:       Patient ID: Driss Hernandez Jr. is a 66 y.o. male.    Chief Complaint: Post-op Evaluation    HPI  Review of Systems    Objective:      Physical Exam    Assessment:       1. Post-operative state    2. Nuclear sclerosis, left        Plan:       S/p CE OD- Doing well.     Cataract OS- Not visually significant.        Taper gtts OD.  RTC 3 wks.

## 2017-09-08 ENCOUNTER — PATIENT MESSAGE (OUTPATIENT)
Dept: FAMILY MEDICINE | Facility: CLINIC | Age: 67
End: 2017-09-08

## 2017-09-13 ENCOUNTER — CLINICAL SUPPORT (OUTPATIENT)
Dept: REHABILITATION | Facility: HOSPITAL | Age: 67
End: 2017-09-13
Attending: INTERNAL MEDICINE
Payer: MEDICARE

## 2017-09-13 DIAGNOSIS — M25.512 CHRONIC LEFT SHOULDER PAIN: ICD-10-CM

## 2017-09-13 DIAGNOSIS — R29.3 POSTURE IMBALANCE: ICD-10-CM

## 2017-09-13 DIAGNOSIS — M25.619 DECREASED RANGE OF MOTION (ROM) OF SHOULDER: ICD-10-CM

## 2017-09-13 DIAGNOSIS — G89.29 CHRONIC LEFT SHOULDER PAIN: ICD-10-CM

## 2017-09-13 DIAGNOSIS — R29.898 DECREASED STRENGTH OF UPPER EXTREMITY: ICD-10-CM

## 2017-09-13 PROCEDURE — 97110 THERAPEUTIC EXERCISES: CPT | Mod: PN

## 2017-09-13 NOTE — PROGRESS NOTES
"                                                    Physical Therapy Daily Note     Name: Driss Hernandez Jr.  Clinic Number: [id number]  Diagnosis:   Encounter Diagnoses   Name Primary?    Chronic left shoulder pain     Decreased strength of upper extremity     Decreased range of motion (ROM) of shoulder     Posture imbalance      Physician: Jono Willoughby MD     Visit #: 5 of 20  MAYEN: 12/31/17    Time In: 1:00pm  Time Out: 2:00pm  Total Treatment Time 1:1: 60 min (1:1 with PT 60 mins)    Subjective     Pt reports: He is incorporating increassed stretching and rotator cuff/suni-scapular strengthening into his gym routine.   Pain Scale: Driss rates pain on a scale of 0-10 to be 1 currently.    Objective     Driss received individual therapeutic exercises to develop strength, endurance, ROM, flexibility, posture and core stabilization for 60 minutes including:    UBE 3'/3' Lvl 3  Doorway Stretch 3x30"  Pulleys Flex/Abd 2'/2'  Shld Row c/ BTB 3x15  Shld Ext c/ BTB 3x15  Shld IR/ER c/ BTB 3x10  UT Stretch 3x30"  Shld IR Stretch c/ strap 5x20"    SL ER c/ ball 2x10  SL ER 3x10 3#  Prone I, W, Y 3x8 Bilateral UE (face-cradle)  Standing D2 Flex c/ YTB 2x10  Standing Lower Trap Lift-off 2x10    NP:  Supine 1/2 Foam Pec Stretch 3 min  Serratus Punch c/ Red Wand 3x10  Shld ER Horiz Abd "No Money" c/ RTB 3x10  SL Abd 2x10   Shld ER stretch c/ wand 5x20" - NP  Prone Row 2# 2x15   Scapular Clock c/ RTB 7x  Scap Retract 15x    Driss received the following manual therapy techniques: Joint mobilizations and Soft tissue Mobilization were applied to the: L Shoulder for 0 minutes including:    Written Home Exercises Provided: Yes - Shld Row, Ext, IR, ER, Prone Row  Pt demo good understanding of the education provided. Driss demonstrated good return demonstration of activities.     PT/PTA face to face conference performed during this session    Assessment     Patient tolerated treatment session very well. Pt with easily " achieved muscle fatigue in suni-scapular and rotator cuff musculature while performing strength and endurance training aimed specifically at L lower trapezius musculature, with increased global compensatory motion. Pt continues to demonstrate increased L shoulder hike due to excessive upper trap activation, easily achieved therapeutic effect following flexibility training. Pt with difficulty with side-lying external rotation strength/endurance due to catching ball with L hand.    This is a 66 y.o. male referred to outpatient physical therapy and presents with a medical diagnosis of L shoulder pain and demonstrates limitations as described in the problem list. Pt prognosis is Good. Pt will continue to benefit from skilled outpatient physical therapy to address the deficits listed in the problem list, provide pt/family education and to maximize pt's level of independence in the home and community environment.     Goals as follows:  Short Term GOALS: 4 weeks. Pt agrees with goals set.  1. Patient demonstrates independence with HEP.   2. Patient demonstrates independence with Postural Awareness.   3. Pt will demonstrate ability to reach L UE to shoulder-height shelf, no symptom provocation, appropriate movement pattern    4. Patient will report pain of 4/10 at worst, on 0-10 pain scale, with all activity  5. Pt will increase strength to 1/3 muscle grade or greater to improve tolerance to all functional activities pain free.      Plan     Certification Period: 8/22/17 - 10/22/17    Continue with established Plan of Care towards PT goals.    Therapist: Akhil Guajardo, PT  9/13/2017

## 2017-09-20 ENCOUNTER — CLINICAL SUPPORT (OUTPATIENT)
Dept: REHABILITATION | Facility: HOSPITAL | Age: 67
End: 2017-09-20
Attending: INTERNAL MEDICINE
Payer: MEDICARE

## 2017-09-20 DIAGNOSIS — G89.29 CHRONIC LEFT SHOULDER PAIN: ICD-10-CM

## 2017-09-20 DIAGNOSIS — M25.512 CHRONIC LEFT SHOULDER PAIN: ICD-10-CM

## 2017-09-20 DIAGNOSIS — M25.619 DECREASED RANGE OF MOTION (ROM) OF SHOULDER: ICD-10-CM

## 2017-09-20 DIAGNOSIS — R29.898 DECREASED STRENGTH OF UPPER EXTREMITY: ICD-10-CM

## 2017-09-20 DIAGNOSIS — R29.3 POSTURE IMBALANCE: ICD-10-CM

## 2017-09-20 PROCEDURE — 97110 THERAPEUTIC EXERCISES: CPT | Mod: PN

## 2017-09-20 NOTE — PROGRESS NOTES
"                                                    Physical Therapy Daily Note     Name: Driss Hernandez Jr.  Clinic Number: [id number]  Diagnosis:   Encounter Diagnoses   Name Primary?    Chronic left shoulder pain     Decreased strength of upper extremity     Decreased range of motion (ROM) of shoulder     Posture imbalance      Physician: Jono Willoughby MD     Visit #: 6 of 20  MAYEN: 12/31/17    Time In: 1030  Time Out: 1130  Total Treatment Time 1:1: 60 min (1:1 with PT 30 mins)    Subjective     Pt reports: He is incorporating increased stretching and rotator cuff/suni-scapular strengthening into his gym routine.   Pain Scale: Driss rates pain on a scale of 0-10 to be 1 currently.    Objective     Driss received individual therapeutic exercises to develop strength, endurance, ROM, flexibility, posture and core stabilization for 60 minutes including:    UBE 3'/3' Lvl 3  Doorway Stretch 3x30"  Pulleys Flex/Abd 2'/2'  Shld Row c/ BTB 3x15  Shld Ext c/ BTB 3x15  Shld IR/ER c/ BTB 3x10  UT Stretch 2x30"  Shld IR Stretch c/ strap 5x20"    SL ER c/ ball 2x10 - NP  SL ER 3x10 3#  Prone I, W, Y 3x8 UE (face-cradle)  Standing D2 Flex c/ YTB 2x10  Standing Lower Trap Lift-off 2x10  Serratus Wall Slides 10x c/ YTB at wrist  Shld ER stretch c/ wand 2x30"    NP:  Supine 1/2 Foam Pec Stretch 3 min   Serratus Punch c/ Red Wand 3x10  Shld ER Horiz Abd "No Money" c/ RTB 3x10  SL Abd 2x10     Prone Row 2# 2x15   Scapular Clock c/ RTB 7x  Scap Retract 15x    Driss received the following manual therapy techniques: Joint mobilizations and Soft tissue Mobilization were applied to the: L Shoulder for 0 minutes including:    Written Home Exercises Provided: Yes - Shld Row, Ext, IR, ER, Prone Row  Pt demo good understanding of the education provided. Driss demonstrated good return demonstration of activities.     PT/PTA face to face conference performed during this session    Assessment     Patient tolerated treatment session " very well. Pt with easily achieved muscle fatigue while performing prone suni-scapular strengthening this session, with modification of activity to alternating unilateral repetitions, rather than bilateral extremity, to decrease lumbar compensation. Pt with difficulty performing serratus wall slides with proper form due to muscle imbalance and preference to abduct shoulders.    This is a 66 y.o. male referred to outpatient physical therapy and presents with a medical diagnosis of L shoulder pain and demonstrates limitations as described in the problem list. Pt prognosis is Good. Pt will continue to benefit from skilled outpatient physical therapy to address the deficits listed in the problem list, provide pt/family education and to maximize pt's level of independence in the home and community environment.     Goals as follows:  Short Term GOALS: 4 weeks. Pt agrees with goals set.  1. Patient demonstrates independence with HEP.   2. Patient demonstrates independence with Postural Awareness.   3. Pt will demonstrate ability to reach L UE to shoulder-height shelf, no symptom provocation, appropriate movement pattern    4. Patient will report pain of 4/10 at worst, on 0-10 pain scale, with all activity  5. Pt will increase strength to 1/3 muscle grade or greater to improve tolerance to all functional activities pain free.      Plan     Certification Period: 8/22/17 - 10/22/17    Continue with established Plan of Care towards PT goals.    Therapist: Akhil Guajardo, PT  9/20/2017

## 2017-09-21 ENCOUNTER — TELEPHONE (OUTPATIENT)
Dept: PAIN MEDICINE | Facility: CLINIC | Age: 67
End: 2017-09-21

## 2017-09-21 NOTE — TELEPHONE ENCOUNTER
Reminded patient of Pain Management appointment scheduled for tomorrow at 8:30 am with Dr. Byrd- verbal confirmation received.

## 2017-09-22 ENCOUNTER — OFFICE VISIT (OUTPATIENT)
Dept: PAIN MEDICINE | Facility: CLINIC | Age: 67
End: 2017-09-22
Payer: MEDICARE

## 2017-09-22 VITALS
WEIGHT: 199 LBS | DIASTOLIC BLOOD PRESSURE: 71 MMHG | BODY MASS INDEX: 31.17 KG/M2 | SYSTOLIC BLOOD PRESSURE: 168 MMHG | RESPIRATION RATE: 18 BRPM | OXYGEN SATURATION: 98 % | HEART RATE: 65 BPM

## 2017-09-22 DIAGNOSIS — M51.36 DDD (DEGENERATIVE DISC DISEASE), LUMBAR: Primary | ICD-10-CM

## 2017-09-22 DIAGNOSIS — M48.061 LUMBAR STENOSIS: ICD-10-CM

## 2017-09-22 DIAGNOSIS — M47.816 LUMBAR SPONDYLOSIS: ICD-10-CM

## 2017-09-22 PROCEDURE — 99999 PR PBB SHADOW E&M-EST. PATIENT-LVL III: CPT | Mod: PBBFAC,,, | Performed by: PAIN MEDICINE

## 2017-09-22 PROCEDURE — 3077F SYST BP >= 140 MM HG: CPT | Mod: S$GLB,,, | Performed by: PAIN MEDICINE

## 2017-09-22 PROCEDURE — 3078F DIAST BP <80 MM HG: CPT | Mod: S$GLB,,, | Performed by: PAIN MEDICINE

## 2017-09-22 PROCEDURE — 99204 OFFICE O/P NEW MOD 45 MIN: CPT | Mod: S$GLB,,, | Performed by: PAIN MEDICINE

## 2017-09-22 PROCEDURE — 3008F BODY MASS INDEX DOCD: CPT | Mod: S$GLB,,, | Performed by: PAIN MEDICINE

## 2017-09-22 PROCEDURE — 1159F MED LIST DOCD IN RCRD: CPT | Mod: S$GLB,,, | Performed by: PAIN MEDICINE

## 2017-09-22 PROCEDURE — 1125F AMNT PAIN NOTED PAIN PRSNT: CPT | Mod: S$GLB,,, | Performed by: PAIN MEDICINE

## 2017-09-22 NOTE — LETTER
September 22, 2017      Jono Willoughby MD  [address] [facility]  [place] [address]           [facility]  [address] [facility]  [place] [address]  Phone: [phone]  Fax: [phone]          Patient: Driss Hernandez Jr.   MR Number: [id number]   YOB: 1950   Date of Visit: 9/22/2017       Dear Dr. Jono Willoughby:    Thank you for referring Driss Hernandez to me for evaluation. Attached you will find relevant portions of my assessment and plan of care.    If you have questions, please do not hesitate to call me. I look forward to following Driss Hernandez along with you.    Sincerely,    Eze Byrd Jr., MD    Enclosure  CC:  No Recipients    If you would like to receive this communication electronically, please contact [email or web address] or [phone] to request more information on [employer] Link access.    For providers and/or their staff who would like to refer a patient to [facility], please contact us through our one-stop-shop provider referral line, [facility], at [phone].    If you feel you have received this communication in error or would no longer like to receive these types of communications, please e-mail [email or web address]

## 2017-09-22 NOTE — PROGRESS NOTES
Subjective:     Patient ID: Driss Hernandez Jr. is a 66 y.o. male    Chief Complaint: Back Pain (Lower)      Referred by: Jono Willoughby MD      HPI:    Driss Hernandez Jr. is a 66 y.o. male who presents today with chronic bilateral low back pain R>>>L. Pain started over 40 years ago. No inciting injury or event noted. Pain is located mainly on the right side of the lower lumbar paraspinals. About 5 weeks ago, patient began to have right lwoer extremity pain that he felt was related to his back pain, but this has subsided. He denies any numbness, tingling, weakness, or b/b dysfunction. The pain is described as dull, but can become sharp at times. Patient states that his pain is worse in the morning. He does not sleep well. States that he wakes up every 45 minutes. Has taken Tizanidine PRN in the pat, but cannot recall how it affected him. Probably has not taken in over a year. Cannot take NSAIDs due to decreased renal function. Has taken in the past with mild relief.  This pain is described in detail below.    Physical Therapy: No    Non-pharmacologic Treatment: Nothing really helps         · TENS? No    Pain Medications:         · Currently taking: Nothing    · Has tried in the past:  NSAIDs, Tizanidine, Tylenol, gabapentin (well-controlled diabetic neuropathy)    · Has not tried: Opioids, Tylenol, TCAs, SNRIs, topical creams    Blood thinners: ASA 325mg daily    Interventional Therapies: None    Relevant Surgeries: Previous right L4 hemilaminectomy    Affecting sleep? Yes    Affecting daily activities? yes    Depressive symptoms? no          · SI/HI? No    Work status: Unemployed    Pain Scores:    Best:       3/10  Worst:     8/10  Usually:   4/10  Today:    4/10    Review of Systems   Constitutional: Negative for activity change, appetite change, chills, fatigue, fever and unexpected weight change.   HENT: Negative for hearing loss.    Eyes: Negative for visual disturbance.   Respiratory: Negative  for chest tightness and shortness of breath.    Cardiovascular: Negative for chest pain.   Gastrointestinal: Negative for abdominal pain, constipation, diarrhea, nausea and vomiting.   Genitourinary: Negative for difficulty urinating.   Musculoskeletal: Positive for back pain. Negative for gait problem and neck pain.   Skin: Negative for rash.   Neurological: Negative for dizziness, weakness, light-headedness, numbness and headaches.   Psychiatric/Behavioral: Positive for sleep disturbance. Negative for hallucinations and suicidal ideas. The patient is not nervous/anxious.        Past Medical History:   Diagnosis Date    Diabetes mellitus with neurological manifestations, uncontrolled 1/24/2017    Diabetic polyneuropathy associated with type 2 diabetes mellitus 1/24/2017    Diabetic polyneuropathy associated with type 2 diabetes mellitus     Essential hypertension 1/24/2017    Gastroesophageal reflux disease 1/24/2017    Hyperlipidemia 1/24/2017    Insomnia 1/24/2017    Long-term insulin use 1/24/2017    Nuclear sclerosis of both eyes 8/24/2017    Obesity     Uncontrolled type 2 diabetes mellitus without complication, with long-term current use of insulin 1/24/2017       Past Surgical History:   Procedure Laterality Date    BACK SURGERY      2002    COLONOSCOPY N/A 2/21/2017    Procedure: COLONOSCOPY;  Surgeon: Robb Rosado MD;  Location[facility];  Service: Endoscopy;  Laterality: N/A;    CORONARY ARTERY BYPASS GRAFT      March 2016    TONSILLECTOMY         Social History     Social History    Marital status:      Spouse name: N/A    Number of children: N/A    Years of education: N/A     Occupational History    Not on file.     Social History Main Topics    Smoking status: Never Smoker    Smokeless tobacco: Never Used    Alcohol use No    Drug use: No    Sexual activity: Yes     Partners: Female     Other Topics Concern    Not on file     Social History Narrative    No  "narrative on file       Review of patient's allergies indicates:   Allergen Reactions    Penicillins Other (See Comments)     Unknown reaction, Had a reaction as a child    Shrimp Itching     Hand itching       Current Outpatient Prescriptions on File Prior to Visit   Medication Sig Dispense Refill    ACCU-CHEK MOIZ CONTROL SOLN Soln       ACCU-CHEK MOIZ PLUS TEST STRP Strp       ACCU-CHEK SOFTCLIX LANCETS Misc       aspirin 325 MG tablet Take 325 mg by mouth every morning.       atorvastatin (LIPITOR) 40 MG tablet Take 40 mg by mouth every morning.       BD ALCOHOL SWABS PadM       BD INSULIN PEN NEEDLE UF MINI 31 gauge x 3/16" Ndle       difluprednate (DUREZOL) 0.05 % Drop ophthalmic solution Place 1 drop into the right eye 4 (four) times daily. For 30 days 5 mL 1    fenofibrate 160 MG Tab Take 160 mg by mouth every morning.       gabapentin (NEURONTIN) 100 MG capsule Take by mouth 2 (two) times daily. Takes two 100 mg capsules in the morning and three 100 mg capsules with dinner      ILEVRO 0.3 % DrpS Place 1 drop into both eyes once daily. For 30 days 3 mL 1    insulin aspart protamine-insulin aspart (NOVOLOG 70/30) 100 unit/mL (70-30) InPn pen Inject into the skin 2 (two) times daily before meals.      LANTUS SOLOSTAR 100 unit/mL (3 mL) InPn pen Inject 13 Units into the skin every morning.       lisinopril (PRINIVIL,ZESTRIL) 40 MG tablet Take 40 mg by mouth daily with dinner or evening meal.       metformin (GLUCOPHAGE-XR) 500 MG 24 hr tablet Take 1,000 mg by mouth 2 (two) times daily with meals. Takes two 500 mg twice a day      metoprolol succinate (TOPROL-XL) 25 MG 24 hr tablet Take 25 mg by mouth daily with dinner or evening meal.       nateglinide (STARLIX) 60 MG tablet Take 60 mg by mouth 3 (three) times daily before meals.      omeprazole (PRILOSEC) 20 MG capsule Take 20 mg by mouth daily with dinner or evening meal.       sildenafil (REVATIO) 20 mg Tab 1-5 pills at a time per day " prn 30 tablet 11    tizanidine (ZANAFLEX) 4 MG tablet Take 4 mg by mouth every 6 (six) hours as needed.      triazolam (HALCION) 0.25 MG Tab TK 1 T PO QHS, prn  5     No current facility-administered medications on file prior to visit.        Objective:      BP (!) 168/71 (BP Location: Right arm, Patient Position: Sitting)   Pulse 65   Resp 18   Wt 90.3 kg (199 lb)   SpO2 98%   BMI 31.17 kg/m²     Exam:  GEN:  Well developed, well nourished.  No acute distress. No pain behavior.  HEENT:  No trauma.  Mucous membranes moist.  Nares patent bilaterally.  PSYCH: Normal affect. Thought content appropriate.  CHEST:  Breathing symmetric.  No audible wheezing.  ABD: Soft, non-tender, non-distended.  SKIN:  Warm, pink, dry.  No rash on exposed areas.    EXT:  No cyanosis, clubbing, or edema.  No color change or changes in nail or hair growth.  NEURO/MUSCULOSKELETAL:  Fully alert, oriented, and appropriate. Speech normal ivania. No cranial nerve deficits.   Gait: Normal.  No trendelenburg sign bilaterally.   Motor Strength: 5/5 motor strength throughout lower extremities.   Sensory: No sensory deficit in the lower extremities.   Reflexes:  1+ and symmetric throughout.  Downgoing Babinski's bilaterally.  No clonus or spasticity.  L-Spine:  Very limited ROM with mild pain on flexion and extension. equivocal facet loading bilaterally.  Negative SLR bilaterally.    SI Joint/Hip: Negative BENJAMIN bilaterally.  Negative FADIR bilaterally.  No TTP over lumbar paraspinals, bilateral SI joints, hips, piriformis muscles, or GTB. Very tight hamstrings and lumbar paraspinals noted on exam.        Imaging:  Narrative     MRI lumbar spine without contrast.    Comparison: None.    Technique: Sagittal T1, T2, stir and axial T2 and T1-weighted images were obtained.  No IV contrast was utilized.    Results: The alignment of the lumbar spine is normal.  The vertebral body heights are well-maintained.  There is mild disc desiccation at all  level.  Moderate disc space narrowing at L4-L5.  No evidence of malignant bone marrow replacement process or infection.  The conus medullaris terminates in good position.    T12-L1: Mild diffuse disc bulge, no central canal or foraminal stenosis.  The facet joints appear normal.    L1-L2: Mild diffuse disc bulge, mild facet joint degenerative change, no central canal or foraminal stenosis.    L2-L3: Mild diffuse disc bulge, moderate facet joint osseous hypertrophy.  No central canal or significant foramina narrowing.    L3-L4: Mild diffuse disc bulge, moderate facet joint osseous hypertrophy.  There is no central canal stenosis.  There is no greater than mild foraminal narrowing.    L4-L5: Laminectomy defect on the right.  There is a diffuse disc bulge and what appears to be a right paracentral disc extrusion which could abut the descending and exiting nerve roots , there is good decompression of the canal posteriorly.  There is moderate to severe bilateral foraminal narrowing.    L5-S1: Diffuse disc bulge and moderate to severe facet joint osseous hypertrophy, no central canal stenosis.  There is severe bilateral foramina narrowing.    The paraspinal soft tissues appear normal.   Impression         Postoperative changes of right laminectomy at L4-L5.  There is a diffuse disc bulge with a right paracentral/foraminal disc extrusion likely to abut on the descending and right exiting nerve root.  There is moderate to severe bilateral foraminal narrowing at this level.  There is severe bilateral foraminal also seen at L5-S1 due to facet joint osseous hypertrophy.          Electronically signed by: ISAI FERNANDEZ MD  Date: 08/23/17  Time: 08:40          Assessment:       Encounter Diagnoses   Name Primary?    DDD (degenerative disc disease), lumbar Yes    Lumbar spondylosis     Lumbar stenosis          Plan:       Driss was seen today for back pain.    Diagnoses and all orders for this visit:    DDD (degenerative  disc disease), lumbar  -     Ambulatory consult to Physical Therapy    Lumbar spondylosis  -     Ambulatory consult to Physical Therapy    Lumbar stenosis  -     Ambulatory consult to Physical Therapy        Driss Hernandez Jr. is a 66 y.o. male with chronic low back pain of unclear etiology. Likely multifactorial. Multifactorial degenerative changes noted in the lumbar spine on MRI leading to multi-level foraminal stenosis, though exam not very indicative of neurologic etiology of pain. Very tight paraspinal muscles and limited ROM limited exam findings.     1. Start physical therapy. ROM, strengthening, stretching, and HEP. Modalities ok  2. Start Tizanidine 4mg qhs. He is to increase dose by 4mg per night every week until taking 16mg nightly. Patient advised to stop increasing dose if side effects are too problematic. This will hopefully help as a muscle relaxer, but also to help patient sleep as this medication can be sedating. Patient already with prescription. Patient will call if needs new prescription or refill.  3. Pertinent imaging studies reviewed by me. Imaging results were discussed with patient.  4. RTC in 6 weeks. Will discuss efficacy of PT and tizanidine. May consider L5-S1 ILESI vs. Facet injections if inadequate improvement with therapy and meds.

## 2017-09-25 ENCOUNTER — PATIENT MESSAGE (OUTPATIENT)
Dept: PAIN MEDICINE | Facility: CLINIC | Age: 67
End: 2017-09-25

## 2017-09-27 ENCOUNTER — PATIENT MESSAGE (OUTPATIENT)
Dept: PAIN MEDICINE | Facility: CLINIC | Age: 67
End: 2017-09-27

## 2017-09-27 RX ORDER — TIZANIDINE 4 MG/1
16 TABLET ORAL NIGHTLY
Qty: 120 TABLET | Refills: 0 | Status: SHIPPED | OUTPATIENT
Start: 2017-09-27 | End: 2017-10-26 | Stop reason: SDUPTHER

## 2017-09-28 ENCOUNTER — OFFICE VISIT (OUTPATIENT)
Dept: OPHTHALMOLOGY | Facility: CLINIC | Age: 67
End: 2017-09-28
Payer: MEDICARE

## 2017-09-28 DIAGNOSIS — H25.12 NUCLEAR SCLEROSIS, LEFT: ICD-10-CM

## 2017-09-28 DIAGNOSIS — Z98.890 POST-OPERATIVE STATE: Primary | ICD-10-CM

## 2017-09-28 PROCEDURE — 99999 PR PBB SHADOW E&M-EST. PATIENT-LVL II: CPT | Mod: PBBFAC,,, | Performed by: OPHTHALMOLOGY

## 2017-09-28 PROCEDURE — 99024 POSTOP FOLLOW-UP VISIT: CPT | Mod: S$GLB,,, | Performed by: OPHTHALMOLOGY

## 2017-09-28 NOTE — PROGRESS NOTES
Subjective:       Patient ID: Driss Hernandez Jr. is a 66 y.o. male.    Chief Complaint: Post-op Evaluation (3 weeks po od )    HPI  Review of Systems    Objective:      Physical Exam    Assessment:       1. Post-operative state    2. Nuclear sclerosis, left        Plan:       S/p CE OD- Doing well.     Cataract OS- Not visually significant.        Readers.  RTC Dr Orantes in 6 mos.

## 2017-10-02 ENCOUNTER — CLINICAL SUPPORT (OUTPATIENT)
Dept: REHABILITATION | Facility: HOSPITAL | Age: 67
End: 2017-10-02
Attending: PAIN MEDICINE
Payer: MEDICARE

## 2017-10-02 DIAGNOSIS — G89.29 CHRONIC MIDLINE LOW BACK PAIN WITHOUT SCIATICA: ICD-10-CM

## 2017-10-02 DIAGNOSIS — M62.89 MUSCLE TIGHTNESS: ICD-10-CM

## 2017-10-02 DIAGNOSIS — M62.81 MUSCLE WEAKNESS: ICD-10-CM

## 2017-10-02 DIAGNOSIS — M54.50 CHRONIC MIDLINE LOW BACK PAIN WITHOUT SCIATICA: ICD-10-CM

## 2017-10-02 PROCEDURE — G8978 MOBILITY CURRENT STATUS: HCPCS | Mod: CK,PN

## 2017-10-02 PROCEDURE — G8979 MOBILITY GOAL STATUS: HCPCS | Mod: CJ,PN

## 2017-10-02 PROCEDURE — 97161 PT EVAL LOW COMPLEX 20 MIN: CPT | Mod: PN

## 2017-10-02 PROCEDURE — 97110 THERAPEUTIC EXERCISES: CPT | Mod: PN

## 2017-10-02 NOTE — PLAN OF CARE
"                                                    Physical Therapy Initial Evaluation     Name: Driss Hernandez Jr.  Clinic Number: [id number]    Diagnosis:   Encounter Diagnoses   Name Primary?    Chronic midline low back pain without sciatica     Muscle weakness     Muscle tightness      Physician: Eze Byrd Jr*  Treatment Orders: PT Eval and Treat  Past Medical History:   Diagnosis Date    Chronic midline low back pain without sciatica 10/2/2017    Diabetes mellitus with neurological manifestations, uncontrolled 1/24/2017    Diabetic polyneuropathy associated with type 2 diabetes mellitus 1/24/2017    Diabetic polyneuropathy associated with type 2 diabetes mellitus     Essential hypertension 1/24/2017    Gastroesophageal reflux disease 1/24/2017    Hyperlipidemia 1/24/2017    Insomnia 1/24/2017    Long-term insulin use 1/24/2017    Nuclear sclerosis of both eyes 8/24/2017    Obesity     Uncontrolled type 2 diabetes mellitus without complication, with long-term current use of insulin 1/24/2017     Current Outpatient Prescriptions   Medication Sig    ACCU-CHEK MOIZ CONTROL SOLN Soln     ACCU-CHEK MOIZ PLUS TEST STRP Strp     ACCU-CHEK SOFTCLIX LANCETS Misc     aspirin 325 MG tablet Take 325 mg by mouth every morning.     atorvastatin (LIPITOR) 40 MG tablet Take 40 mg by mouth every morning.     BD ALCOHOL SWABS PadM     BD INSULIN PEN NEEDLE UF MINI 31 gauge x 3/16" Ndle     fenofibrate 160 MG Tab Take 160 mg by mouth every morning.     gabapentin (NEURONTIN) 100 MG capsule Take by mouth 2 (two) times daily. Takes two 100 mg capsules in the morning and three 100 mg capsules with dinner    insulin aspart protamine-insulin aspart (NOVOLOG 70/30) 100 unit/mL (70-30) InPn pen Inject into the skin 2 (two) times daily before meals.    LANTUS SOLOSTAR 100 unit/mL (3 mL) InPn pen Inject 13 Units into the skin every morning.     lisinopril (PRINIVIL,ZESTRIL) 40 MG tablet Take 40 " "mg by mouth daily with dinner or evening meal.     metformin (GLUCOPHAGE-XR) 500 MG 24 hr tablet Take 1,000 mg by mouth 2 (two) times daily with meals. Takes two 500 mg twice a day    metoprolol succinate (TOPROL-XL) 25 MG 24 hr tablet Take 25 mg by mouth daily with dinner or evening meal.     nateglinide (STARLIX) 60 MG tablet Take 60 mg by mouth 3 (three) times daily before meals.    omeprazole (PRILOSEC) 20 MG capsule Take 20 mg by mouth daily with dinner or evening meal.     sildenafil (REVATIO) 20 mg Tab 1-5 pills at a time per day prn    tizanidine (ZANAFLEX) 4 MG tablet Take 4 tablets (16 mg total) by mouth every evening.    triazolam (HALCION) 0.25 MG Tab TK 1 T PO QHS, prn     No current facility-administered medications for this visit.      Review of patient's allergies indicates:   Allergen Reactions    Penicillins Other (See Comments)     Unknown reaction, Had a reaction as a child    Shrimp Itching     Hand itching       Subjective     Patient states:  Pt has had chronic low back pain, which resulted in Pt having Lumbar Fusion performed in ~2001. He reports that LBP got worse ~1 month ago during increased activity. He has been lifting heavy ice chests recently and experiences increased pain. Significant difficulty with rotating L trunk. Pt with improved pain following low back surgery, however feels like R LE pain is returning. Prolonged sitting results in increased low back pain, which can radiate to R knee, lateral thigh, and around ankle. When he goes to local gym for aerobic/resistance training, he will experience pain in R ankle getting worse. R ankle pain significantly worsens with brisk walking on treadmill. Occasionally has pain in R gluteal region and "down" into leg. Pt denies strength discrepancies in each leg. Denies numbness or tingling. Pain is sharp in nature. sharp pain around ankle.    Diagnostic Imaging: MRI 8/23/17  "Postoperative changes of right laminectomy at L4-L5.  There " "is a diffuse disc bulge with a right paracentral/foraminal disc extrusion likely to abut on the descending and right exiting nerve root.  There is moderate to severe bilateral foraminal narrowing at this level.  There is severe bilateral foraminal also seen at L5-S1 due to facet joint osseous hypertrophy."    Pain Scale: Driss rates pain on a scale of 0-10 to be 6 at worst; 4 currently; 0 at best .  Onset: gradual  Radicular symptoms:  None  Aggravating factors:   Sitting, Brisk walking in foot, following increased activity, pain at night  Easing factors:  Changing positions, moving around  Prior Therapy: Yes - for shoulder with me at this clinic currently  Home Environment (Steps/Adaptations): 1-story house; no stairs  Functional Deficits Leading to Referral: Difficulty exercising, lifting, yard work, heavy household activities  Prior functional status: Independent  Bowel/Bladder Change: none  DME owned/used: None  Occupation:  Retired                       Pts goals:  To decrease pain in low back and leg, decrease pain at night     Objective     Posture Alignment: slouched sitting posture, forward head, rounded shoulders, flat back with decreased lumbar lordosis  Palpation: Significant hypertonicity; R paraspinal compared to L paraspinal, significant tenderness R QL, mild tenderness sacrum/PSIS, tenderness L1-3 spinous process    LUMBAR SPINE AROM:   Flexion: 60/35 - 25 p   Extension: 20/5- 16   Left Sidebend: 10   Right Sidebend: 10 p! R lumbar   Left Rotation: Decreased   Right Rotation: Decreased     SEGMENTAL MOBILITY: Hypomobility and pain L1-3; post-operative L4/5 not-tested    LOWER EXTREMITY STRENGTH:   Left Right   Quadriceps 4+/5 4+/5   Hamstrings 4/5 p! lumbar 4/5 p! lumbar     Iliopsoas 4+/5 4/5    PGM 4/5 4-/5   Hip IR 4+/5 4+/5   Hip ER 4+/5 4+/5   Hip Ext 4-/5 4-/5   Ankle DF 4+/5 4+/5   Ankle PF 4+/5 4+/5     Dermatomes: Sensation: Light Touch: mild paresthesia with B plantar feet  Saddle " Sensation: Intact  Myotomes: Intact  Flexibility:  Significant B Decreased HS Length, B Hip Flexor    Special Tests:   Left Right   Slump Negative Negative   SLR Negative Negative   Crossed SLR Negative Negative   Sacral Thrust Negative Negative   BENJAMIN Positive (motion restriction, no pain) Positive (motion restriction, pain R lateral hip)     GAIT: Driss ambulates with no assistive device with independently.     GAIT DEVIATIONS: Driss displays decreased trunk rotation, L pelvic drop  Pt/family was provided educational information, including: role of PT, goals for PT, scheduling - pt verbalized understanding. Discussed insurance limitations with pt.     TREATMENT     Time In: 800  Time Out: 900    PT Evaluation Completed? Yes  Discussed Plan of Care with patient: Yes    Driss received 15 minutes of therapeutic exercise & instruction including:    LTR c/ ball and UE reach  DKTC c/ ball and UE reach  Hamstring c/ strap  Hip Flexor c/ strap  R QL Stretch - Add next session  Seated Trunk Flexion - Add next session    Driss received 0 minutes of manual therapy including:    Written Home Exercises Provided: Yes - LTR, SKTC, HS Str, Hip Flexor Str  Driss demo good understanding of the education provided. Patient demo good return demo of skill of exercises.    Assessment     Patient presents to Physical Therapy Evaluation with diagnosis of Low Back Pain, with signs and symptoms including: increased pain in lumbar and R LE, decreased lumbar ROM, decreased LE strength, increased hypertonicity in lumbar musculature, posture abnormality, and decreased tolerance to functional mobility. Pt with pain localized towards lumbar region and R LE from anterior/lateral thigh and to entire R ankle; however no significant pain provoked during this evaluation in thigh or ankle. Pt with significant soft tissue restriction and decreased muscle length in B lumbar and LE musculature limiting active lumbar ROM. Pt with significant hypertonicity in R  paraspinal lumbar musculature compared to L side. Pt with exquisite tenderness in R QL musculature, likely contributing to pain provocation during prolonged sitting and improper sitting posture. Pt with no neural tension signs noted throughout special testing including Slump and SLR test. Pt with significant hamstring tightness limiting ability to perform forward bending and trunk flexion, as well significant stretching discomfort with SLR and Slump Test. Pt with no significant strength asymmetries with comparing either LE. Pt with good motivation to perform physical activity and responds well to cueing.    Pt prognosis is Good.  Pt will benefit from skilled outpatient physical therapy to address the above stated deficits, provide pt/family education and to maximize pt's level of independence.     Medical necessity is demonstrated by the following IMPAIRMENTS/PROBLEMS:  weakness, impaired endurance, impaired sensation, impaired self care skills, impaired functional mobility, gait instability, decreased coordination, decreased upper extremity function, decreased lower extremity function, pain, abnormal tone, decreased ROM, impared cardiopulmonary response to activity, impaired joint extensibility and impaired muscle length    History  Co-morbidities and personal factors that may impact the plan of care Examination  Body Structures and Functions, activity limitations and participation restrictions that may impact the plan of care Clinical Presentation   Decision Making/ Complexity Score   Co-morbidities:     L Shoulder Pain  Diabetic Neuropathy            Personal Factors:    Body Regions: BLE, trunk/core     Body Systems: musculoskeletal (ROM, strength, endurance, flexibility, gait); neuromuscular (balance, posture, motor control, coordination)          Activity limitations: sleeping, sitting, brisk walking, lifting, carrying, pushing/pulling, bending forward      Participation Restrictions: recreational activity,  exercising, heavy housework, social interaction, community participation         Stable, uncomplicated   Low Complexity    FOTO Lumbar Survey  Score: 42% Limitation       Pt's spiritual, cultural and educational needs considered and pt agreeable to plan of care and goals as stated below:     Short Term GOALS: 5 weeks. Pt agrees with goals set.  1. Patient demonstrates independence with HEP.   2. Patient demonstrates independence with Postural Awareness.   3. Patient demonstrates independence with body mechanics.   4. Patient will report pain of 4/10 at worst, on 0-10 pain scale, with all activity  5. Patient demonstrates increased lumbar active ROM by 5 degrees in all planes to improve tolerance to functional activities pain free.  6. Patient demonstrates increased strength BLE's by 1/3 muscle grade or greater to improve tolerance to functional activities pain free.     Long Term GOALS: 10 weeks. Pt agrees with goals set.  1. Patient demonstrates increased lumbar active ROM by 10 degrees in all planes to improve tolerance to functional activities pain free.   2. Patient demonstrates increased strength BLE's to 4+/5 or greater to improve tolerance to functional activities pain free.   3. Patient demonstrates improved overall function per FOTO Lumbar Survey to 40% Limitation or less.   4. Patient will report pain of 2/10 at worst, on 0-10 pain scale, with all activity  5. Patient demonstrates ability to perform brisk walking for 20 minutes for aerobic exercise, no symptom provocation, appropriate gait pattern  6. Patient demonstrates ability to perform heavy household activities, with mild difficulty, no significant symptom provocation    Functional Limitations Reports - G Codes  Category: Mobility  Tool: FOTO Lumbar Survey  Score: 42% Limitation   Modifier  Impairment Limitation Restriction    CH  0 % impaired, limited or restricted    CI  @ least 1% but less than 20% impaired, limited or restricted    CJ  @ least  20%<40% impaired, limited or restricted    CK  @ least 40%<60% impaired, limited or restricted    CL  @ least 60% <80% impaired, limited or restricted    CM  @ least 80%<100% impaired limited or restricted    CN  100% impaired, limited or restricted     Current/: CK = 40-60% Limitation   Goal/ : CJ = 20-40% Limitation    PLAN     Certification Period: 10/2/17 - 11/2/18    Outpatient physical therapy 1-2 times weekly to include: pt ed, hep, therapeutic exercises, neuromuscular re-education/ balance exercises, manual therapy, joint mobilizations, aquatic therapy and modalities prn. Cont PT for  10-12 weeks. Pt may be seen by PTA as part of the rehabilitation team.     Therapist: Akhil Guajardo, PT  10/2/2017    I have seen the patient, reviewed the therapist's plan of care, and I agree with the plan of care.      I certify the need for these services furnished under this plan of treatment and while under my care.     ___________________ ________ Physician/Referring Practitioner            ___________________________ Date of Signature

## 2017-10-02 NOTE — PROGRESS NOTES
See Full Physical Therapy Evaluation in Plan of Care                                                      Physical Therapy Initial Evaluation     Name: Driss Hernandez Jr.  Clinic Number: [id number]    Diagnosis:   Encounter Diagnoses   Name Primary?    Chronic midline low back pain without sciatica     Muscle weakness     Muscle tightness      Physician: Eze Byrd Jr*  Treatment Orders: PT Eval and Treat    TREATMENT     Time In: 800  Time Out: 900    PT Evaluation Completed? Yes  Discussed Plan of Care with patient: Yes    Driss received 15 minutes of therapeutic exercise & instruction including:    LTR c/ ball and UE reach  DKTC c/ ball and UE reach  Hamstring c/ strap  Hip Flexor c/ strap  R QL Stretch - Add next session  Seated Trunk Flexion - Add next session    Driss received 0 minutes of manual therapy including:    Written Home Exercises Provided: Yes - LTR, SKTC, HS Str, Hip Flexor Str  Driss demo good understanding of the education provided. Patient demo good return demo of skill of exercises.    Assessment     Short Term GOALS: 5 weeks. Pt agrees with goals set.  1. Patient demonstrates independence with HEP.   2. Patient demonstrates independence with Postural Awareness.   3. Patient demonstrates independence with body mechanics.   4. Patient will report pain of 4/10 at worst, on 0-10 pain scale, with all activity  5. Patient demonstrates increased lumbar active ROM by 5 degrees in all planes to improve tolerance to functional activities pain free.  6. Patient demonstrates increased strength BLE's by 1/3 muscle grade or greater to improve tolerance to functional activities pain free.     Long Term GOALS: 10 weeks. Pt agrees with goals set.  1. Patient demonstrates increased lumbar active ROM by 10 degrees in all planes to improve tolerance to functional activities pain free.   2. Patient demonstrates increased strength BLE's to 4+/5 or greater to improve tolerance to functional activities  pain free.   3. Patient demonstrates improved overall function per FOTO Lumbar Survey to 40% Limitation or less.   4. Patient will report pain of 2/10 at worst, on 0-10 pain scale, with all activity  5. Patient demonstrates ability to perform brisk walking for 20 minutes for aerobic exercise, no symptom provocation, appropriate gait pattern  6. Patient demonstrates ability to perform heavy household activities, with mild difficulty, no significant symptom provocation    Functional Limitations Reports - G Codes  Category: Mobility  Tool: FOTO Lumbar Survey  Score: 42% Limitation   Modifier  Impairment Limitation Restriction    CH  0 % impaired, limited or restricted    CI  @ least 1% but less than 20% impaired, limited or restricted    CJ  @ least 20%<40% impaired, limited or restricted    CK  @ least 40%<60% impaired, limited or restricted    CL  @ least 60% <80% impaired, limited or restricted    CM  @ least 80%<100% impaired limited or restricted    CN  100% impaired, limited or restricted     Current/: CK = 40-60% Limitation   Goal/ : CJ = 20-40% Limitation    PLAN     Certification Period: 10/2/17 - 11/2/18    Outpatient physical therapy 1-2 times weekly to include: pt ed, hep, therapeutic exercises, neuromuscular re-education/ balance exercises, manual therapy, joint mobilizations, aquatic therapy and modalities prn. Cont PT for  10-12 weeks. Pt may be seen by PTA as part of the rehabilitation team.     Therapist: Akhil Guajardo, PT  10/2/2017    I have seen the patient, reviewed the therapist's plan of care, and I agree with the plan of care.      I certify the need for these services furnished under this plan of treatment and while under my care.     ___________________ ________ Physician/Referring Practitioner            ___________________________ Date of Signature

## 2017-10-04 ENCOUNTER — CLINICAL SUPPORT (OUTPATIENT)
Dept: REHABILITATION | Facility: HOSPITAL | Age: 67
End: 2017-10-04
Attending: PAIN MEDICINE
Payer: MEDICARE

## 2017-10-04 DIAGNOSIS — R29.3 POSTURE IMBALANCE: ICD-10-CM

## 2017-10-04 DIAGNOSIS — G89.29 CHRONIC MIDLINE LOW BACK PAIN WITHOUT SCIATICA: ICD-10-CM

## 2017-10-04 DIAGNOSIS — R29.898 DECREASED STRENGTH OF UPPER EXTREMITY: ICD-10-CM

## 2017-10-04 DIAGNOSIS — G89.29 CHRONIC LEFT SHOULDER PAIN: ICD-10-CM

## 2017-10-04 DIAGNOSIS — M62.81 MUSCLE WEAKNESS: ICD-10-CM

## 2017-10-04 DIAGNOSIS — M25.512 CHRONIC LEFT SHOULDER PAIN: ICD-10-CM

## 2017-10-04 DIAGNOSIS — M25.612 DECREASED RANGE OF MOTION OF LEFT SHOULDER: ICD-10-CM

## 2017-10-04 DIAGNOSIS — M62.89 MUSCLE TIGHTNESS: ICD-10-CM

## 2017-10-04 DIAGNOSIS — M54.50 CHRONIC MIDLINE LOW BACK PAIN WITHOUT SCIATICA: ICD-10-CM

## 2017-10-04 PROCEDURE — 97110 THERAPEUTIC EXERCISES: CPT | Mod: PN

## 2017-10-04 NOTE — PROGRESS NOTES
"                                                    Physical Therapy Daily Note     Name: Driss Hernandez Jr.  Clinic Number: [id number]  Diagnosis:   Encounter Diagnoses   Name Primary?    Chronic midline low back pain without sciatica     Muscle weakness     Muscle tightness     Chronic left shoulder pain     Decreased strength of upper extremity     Decreased range of motion of left shoulder     Posture imbalance      Physician: Jono Willoughby MD     Visit #: 1 of 20 (new referral; total visit 7) MAYEN: 12/31/17    Time In: 1100  Time Out: 1200  Total Treatment Time 1:1: 60 min (1:1 with PT 30 mins)    Subjective     Pt reports: He is experiencing moderate R knee pain today, minimal discomfort in low back.  Pain Scale: Driss rates pain on a scale of 0-10 to be 1 currently.    Objective     Driss received individual therapeutic exercises to develop strength, endurance, ROM, flexibility, posture and core stabilization for 60 minutes including:    Shld Row c/ BTB 3x15  Shld IR/ER c/ BTB 3x10  Seated Trunk Flexion - 3-way 3 min  Calf Stretch c/ incline board 3x30"  Dynamic HS Stretch c/ OH Reach 15x ea LE    LTR c/ ball and UE reach 2x10 ea  DKTC c/ ball and UE reach 2x10  Hamstring c/ strap 2x30" ea  Hip Flexor c/ strap 2x30" ea  R QL Stretch - 3x30"  TrA Brace 15x  TrA Brace c/ March 15x  Bridges 2x10  Piriformis Str 2x30"    NP:  UBE 3'/3' Lvl 3  Doorway Stretch 3x30"  Pulleys Flex/Abd 2'/2'  Shld Ext c/ BTB 3x15  UT Stretch 2x30"  Shld IR Stretch c/ strap 5x20"  SL ER c/ ball 2x10 - NP  SL ER 3x10 3#  Prone I, W, Y 3x8 UE (face-cradle)  Standing D2 Flex c/ YTB 2x10  Standing Lower Trap Lift-off 2x10  Serratus Wall Slides 10x c/ YTB at wrist  Shld ER stretch c/ wand 2x30"  Supine 1/2 Foam Pec Stretch 3 min   Serratus Punch c/ Red Wand 3x10  Shld ER Horiz Abd "No Money" c/ RTB 3x10  SL Abd 2x10   Prone Row 2# 2x15   Scapular Clock c/ RTB 7x  Scap Retract 15x    Driss received the following manual therapy " techniques: Joint mobilizations and Soft tissue Mobilization were applied to the: L Shoulder for 0 minutes including:    Written Home Exercises Provided: Yes - Shld Row, Ext, IR, ER, Prone Row  Pt demo good understanding of the education provided. Driss demonstrated good return demonstration of activities.     PT/PTA face to face conference performed during this session    Assessment     Patient tolerated treatment session very well. Pt with easily achieved therapeutic effect with static and dynamic flexibility training in all positions this session, no significant symptom provocation in lumbar region. Pt with pain centralizing to lumbar spine compared from LE/gluteal region this session, with decreased severity and irritability in low back pain at conclusion of session.    This is a 66 y.o. male referred to outpatient physical therapy and presents with a medical diagnosis of L shoulder pain and demonstrates limitations as described in the problem list. Pt prognosis is Good. Pt will continue to benefit from skilled outpatient physical therapy to address the deficits listed in the problem list, provide pt/family education and to maximize pt's level of independence in the home and community environment.     Goals as follows:  Short Term GOALS: 4 weeks. Pt agrees with goals set.  1. Patient demonstrates independence with HEP.   2. Patient demonstrates independence with Postural Awareness.   3. Pt will demonstrate ability to reach L UE to shoulder-height shelf, no symptom provocation, appropriate movement pattern    4. Patient will report pain of 4/10 at worst, on 0-10 pain scale, with all activity  5. Pt will increase strength to 1/3 muscle grade or greater to improve tolerance to all functional activities pain free.      Plan     Certification Period: 10/2/17 - 11/2/18    Continue with established Plan of Care towards PT goals.    Therapist: Akhil Guajardo, PT  10/4/2017

## 2017-10-09 ENCOUNTER — CLINICAL SUPPORT (OUTPATIENT)
Dept: REHABILITATION | Facility: HOSPITAL | Age: 67
End: 2017-10-09
Attending: PAIN MEDICINE
Payer: MEDICARE

## 2017-10-09 DIAGNOSIS — M62.81 MUSCLE WEAKNESS: ICD-10-CM

## 2017-10-09 DIAGNOSIS — G89.29 CHRONIC MIDLINE LOW BACK PAIN WITHOUT SCIATICA: ICD-10-CM

## 2017-10-09 DIAGNOSIS — M25.512 CHRONIC LEFT SHOULDER PAIN: ICD-10-CM

## 2017-10-09 DIAGNOSIS — M54.50 CHRONIC MIDLINE LOW BACK PAIN WITHOUT SCIATICA: ICD-10-CM

## 2017-10-09 DIAGNOSIS — M62.89 MUSCLE TIGHTNESS: ICD-10-CM

## 2017-10-09 DIAGNOSIS — R29.3 POSTURE IMBALANCE: ICD-10-CM

## 2017-10-09 DIAGNOSIS — M25.612 DECREASED RANGE OF MOTION OF LEFT SHOULDER: ICD-10-CM

## 2017-10-09 DIAGNOSIS — R29.898 DECREASED STRENGTH OF UPPER EXTREMITY: ICD-10-CM

## 2017-10-09 DIAGNOSIS — G89.29 CHRONIC LEFT SHOULDER PAIN: ICD-10-CM

## 2017-10-09 PROCEDURE — 97140 MANUAL THERAPY 1/> REGIONS: CPT | Mod: PN

## 2017-10-09 PROCEDURE — 97110 THERAPEUTIC EXERCISES: CPT | Mod: PN

## 2017-10-09 NOTE — PROGRESS NOTES
"                                                    Physical Therapy Daily Note     Name: Driss Hernandez Jr.  Clinic Number: [id number]  Diagnosis:   Encounter Diagnoses   Name Primary?    Chronic midline low back pain without sciatica     Muscle weakness     Muscle tightness     Chronic left shoulder pain     Decreased strength of upper extremity     Decreased range of motion of left shoulder     Posture imbalance      Physician: Eze Byrd Jr*     Visit #: 2 of 20 (new referral; total visit 8) MAYEN: 12/31/17    Time In: 1403  Time Out: 1500  Total Treatment Time 1:1: 60 min (1:1 with PT for duration)    Subjective     Pt reports: He is experiencing moderate R knee pain today, minimal discomfort in low back.  Pain Scale: Driss rates pain on a scale of 0-10 to be 1 currently.    Objective     Driss received individual therapeutic exercises to develop strength, endurance, ROM, flexibility, posture and core stabilization for 60 minutes including:    Nustep 5 min  Calf Stretch c/ incline board 3x30"  Shld Row c/ Blue TB 3x15  Shld Ext c/ Blue TB 2x10  Shld IR/ER c/ Blue TB 3x10  Dynamic HS Stretch c/ OH Reach 15x ea LE  Seated Trunk Flexion - 3-way 3 min    LTR c/ ball and UE reach 2x10 ea  DKTC c/ ball and UE reach 2x10  Hamstring c/ strap 3x30" ea  Hip Flexor c/ strap 2x30" ea  R QL Stretch - 3x30"  TrA Brace 15x  TrA Brace c/ March 15x  Bridges 2x10  Piriformis Str 2x30"    Driss received the following manual therapy techniques: Joint mobilizations and Soft tissue Mobilization were applied to the: Lumbar region for 10 minutes including:  STM to B Lumbar Paraspinal, B QL      Written Home Exercises Provided: Yes - Shld Row, Ext, IR, ER, Prone Row  Pt demo good understanding of the education provided. Driss demonstrated good return demonstration of activities.     PT/PTA face to face conference performed during this session    Assessment     Patient tolerated treatment session very well.  Pt with " decreased muscle tightness and soft tissue restriction in B lumbar paraspinal musculature this session, R QL continues to have greater tenderness compared to L QL. Pt with fair ability to maintain consistent TrA activation and maintained contraction with LE movement, cues required for proper breathing sequencing.    This is a 66 y.o. male referred to outpatient physical therapy and presents with a medical diagnosis of L shoulder pain and demonstrates limitations as described in the problem list. Pt prognosis is Good. Pt will continue to benefit from skilled outpatient physical therapy to address the deficits listed in the problem list, provide pt/family education and to maximize pt's level of independence in the home and community environment.     Goals as follows:  Short Term GOALS: 4 weeks. Pt agrees with goals set.  1. Patient demonstrates independence with HEP.   2. Patient demonstrates independence with Postural Awareness.   3. Pt will demonstrate ability to reach L UE to shoulder-height shelf, no symptom provocation, appropriate movement pattern    4. Patient will report pain of 4/10 at worst, on 0-10 pain scale, with all activity  5. Pt will increase strength to 1/3 muscle grade or greater to improve tolerance to all functional activities pain free.      Plan     Certification Period: 10/2/17 - 11/2/18    Continue with established Plan of Care towards PT goals.    Therapist: Akhil Guajardo, PT  10/9/2017

## 2017-10-11 ENCOUNTER — CLINICAL SUPPORT (OUTPATIENT)
Dept: REHABILITATION | Facility: HOSPITAL | Age: 67
End: 2017-10-11
Attending: PAIN MEDICINE
Payer: MEDICARE

## 2017-10-11 DIAGNOSIS — M25.512 CHRONIC LEFT SHOULDER PAIN: ICD-10-CM

## 2017-10-11 DIAGNOSIS — M54.50 CHRONIC MIDLINE LOW BACK PAIN WITHOUT SCIATICA: ICD-10-CM

## 2017-10-11 DIAGNOSIS — G89.29 CHRONIC LEFT SHOULDER PAIN: ICD-10-CM

## 2017-10-11 DIAGNOSIS — M62.81 MUSCLE WEAKNESS: ICD-10-CM

## 2017-10-11 DIAGNOSIS — R29.898 DECREASED STRENGTH OF UPPER EXTREMITY: ICD-10-CM

## 2017-10-11 DIAGNOSIS — R29.3 POSTURE IMBALANCE: ICD-10-CM

## 2017-10-11 DIAGNOSIS — G89.29 CHRONIC MIDLINE LOW BACK PAIN WITHOUT SCIATICA: ICD-10-CM

## 2017-10-11 DIAGNOSIS — M25.612 DECREASED RANGE OF MOTION OF LEFT SHOULDER: ICD-10-CM

## 2017-10-11 DIAGNOSIS — M62.89 MUSCLE TIGHTNESS: ICD-10-CM

## 2017-10-11 PROCEDURE — 97110 THERAPEUTIC EXERCISES: CPT | Mod: PN

## 2017-10-11 NOTE — PROGRESS NOTES
"                                                    Physical Therapy Daily Note     Name: Driss Hernandez Jr.  Clinic Number: [id number]  Diagnosis:   Encounter Diagnoses   Name Primary?    Chronic midline low back pain without sciatica     Muscle weakness     Muscle tightness     Chronic left shoulder pain     Decreased strength of upper extremity     Decreased range of motion of left shoulder     Posture imbalance      Physician: Eze Byrd Jr*     Visit #: 3 of 20 (new referral; total visit 8) MAYEN: 12/31/17    Time In: 1400  Time Out: 1500  Total Treatment Time 1:1: 60 min (1:1 with PT for duration)    Subjective     Pt reports: He believes he is improving overall. His chief complaint has been R ankle pain recently.  Pain Scale: Driss rates pain on a scale of 0-10 to be 1 currently.    Objective     Driss received individual therapeutic exercises to develop strength, endurance, ROM, flexibility, posture and core stabilization for 60 minutes including:    Nustep 5 min  Calf Stretch c/ incline board 3x30"  Shld Row c/ Blue TB 3x15  Shld Ext c/ Blue TB 2x10  Shld IR/ER c/ Blue TB 3x10  Dynamic HS Stretch c/ OH Reach 15x ea LE  Seated Trunk Flexion - 3-way 3 min  Standing Hip Ext/Abd 15x ea  Shuttle Squat 2 black 1 red 3x10  Shuttle Piriformis Str 2x30" ea    LTR c/ ball and UE reach 2x10 ea  DKTC c/ ball and UE reach 2x10  Hamstring c/ strap 3x30" ea  Hip Flexor c/ strap 2x30" ea  R QL Stretch - 3x30"  TrA Brace 15x  TrA Brace c/ March 15x  Bridges 2x10  Piriformis Str 2x30"    Driss received the following manual therapy techniques: Joint mobilizations and Soft tissue Mobilization were applied to the: Lumbar region for 10 minutes including:  STM to B Lumbar Paraspinal, B QL      Written Home Exercises Provided: Yes - Shld Row, Ext, IR, ER, Prone Row  Pt demo good understanding of the education provided. Driss demonstrated good return demonstration of activities.     PT/PTA face to face conference " performed during this session    Assessment     Patient tolerated treatment session very well. Pt with good response to introduction of resistance LE training on shuttle, with no adverse effect, appropriate muscle fatigue achieved. Pt with improved symptom relief in R distal ankle during participation of lumbar stabilization and LE flexibility training this session.    This is a 66 y.o. male referred to outpatient physical therapy and presents with a medical diagnosis of L shoulder pain and demonstrates limitations as described in the problem list. Pt prognosis is Good. Pt will continue to benefit from skilled outpatient physical therapy to address the deficits listed in the problem list, provide pt/family education and to maximize pt's level of independence in the home and community environment.     Goals as follows:  Short Term GOALS: 4 weeks. Pt agrees with goals set.  1. Patient demonstrates independence with HEP.   2. Patient demonstrates independence with Postural Awareness.   3. Pt will demonstrate ability to reach L UE to shoulder-height shelf, no symptom provocation, appropriate movement pattern    4. Patient will report pain of 4/10 at worst, on 0-10 pain scale, with all activity  5. Pt will increase strength to 1/3 muscle grade or greater to improve tolerance to all functional activities pain free.      Plan     Certification Period: 10/2/17 - 11/2/18    Continue with established Plan of Care towards PT goals.    Therapist: Akhil Guajardo, PT  10/11/2017

## 2017-10-18 ENCOUNTER — OFFICE VISIT (OUTPATIENT)
Dept: CARDIOLOGY | Facility: CLINIC | Age: 67
End: 2017-10-18
Payer: MEDICARE

## 2017-10-18 ENCOUNTER — PATIENT MESSAGE (OUTPATIENT)
Dept: FAMILY MEDICINE | Facility: CLINIC | Age: 67
End: 2017-10-18

## 2017-10-18 VITALS
DIASTOLIC BLOOD PRESSURE: 70 MMHG | WEIGHT: 196.19 LBS | SYSTOLIC BLOOD PRESSURE: 125 MMHG | BODY MASS INDEX: 30.73 KG/M2 | HEART RATE: 64 BPM | OXYGEN SATURATION: 97 %

## 2017-10-18 DIAGNOSIS — E11.9 DIABETES MELLITUS WITHOUT COMPLICATION: Primary | ICD-10-CM

## 2017-10-18 DIAGNOSIS — I10 ESSENTIAL HYPERTENSION: ICD-10-CM

## 2017-10-18 DIAGNOSIS — E78.2 MIXED HYPERLIPIDEMIA: ICD-10-CM

## 2017-10-18 DIAGNOSIS — I25.10 CORONARY ARTERY DISEASE INVOLVING NATIVE CORONARY ARTERY OF NATIVE HEART WITHOUT ANGINA PECTORIS: Primary | ICD-10-CM

## 2017-10-18 PROCEDURE — 99999 PR PBB SHADOW E&M-EST. PATIENT-LVL III: CPT | Mod: PBBFAC,,, | Performed by: INTERNAL MEDICINE

## 2017-10-18 PROCEDURE — 99214 OFFICE O/P EST MOD 30 MIN: CPT | Mod: S$GLB,,, | Performed by: INTERNAL MEDICINE

## 2017-10-18 PROCEDURE — 99499 UNLISTED E&M SERVICE: CPT | Mod: S$GLB,,, | Performed by: INTERNAL MEDICINE

## 2017-10-18 NOTE — PROGRESS NOTES
Subjective:    Patient ID:  Driss Hernandez Jr. is a 66 y.o. male who presents for evaluation of Follow-up (3 months )      HPI   previous history:  Here for follow-up of coronary artery disease.  He underwent diagnostic testing as below.  He denies any repetitive chest pain episodes.  He says that he recently went several weeks but then expresses similar episode after eating red beans.  He felt like it was related to passing gas.  Says this did show some improvement.  He denies any other associated symptoms.  He underwent diagnostic testing including PET stress as below.  This was within normal limits.  Overall he has normal LV function.  His Holter was within normal limits.  He denies any PND, orthopnea or lower edema.  He's not expressing dizziness, presyncope or syncope.    Today:  Here for follow-up of coronary artery disease.  He's had no worsening cardiopulmonary complaints.  He's been exercising upwards of 3-4 times per week.  He mainly complains of some back and shoulder issues for which she seen pain management.  He's undergoing therapy for this.  He's not had to undergo any injections.  He denies any chest pain, shortness of breath or palpitations.  He's express no PND, orthopnea or lower edema.  He denies any dizziness, presyncope or syncope.  Otherwise able to do all his daily activities without any issues.    Review of Systems   Constitution: Negative.   HENT: Negative.    Eyes: Negative.    Cardiovascular: Negative for chest pain, dyspnea on exertion, irregular heartbeat, leg swelling, near-syncope, orthopnea, palpitations, paroxysmal nocturnal dyspnea and syncope.   Respiratory: Negative for shortness of breath.    Skin: Negative.    Musculoskeletal: Negative.    Gastrointestinal: Positive for flatus. Negative for abdominal pain, constipation, diarrhea and heartburn.   Genitourinary: Negative for dysuria.   Neurological: Negative for dizziness.   Psychiatric/Behavioral: Negative.            Objective:    Physical Exam   Constitutional: He is oriented to person, place, and time. He appears well-developed and well-nourished. No distress.   HENT:   Head: Normocephalic and atraumatic.   Eyes: Conjunctivae and EOM are normal. Pupils are equal, round, and reactive to light.   Neck: Normal range of motion. Neck supple. No thyromegaly present.   Cardiovascular: Normal rate, regular rhythm and normal heart sounds.    No murmur heard.  Pulmonary/Chest: Effort normal and breath sounds normal. No respiratory distress. He has no wheezes. He has no rales. He exhibits no tenderness.   Abdominal: Soft. Bowel sounds are normal.   Musculoskeletal: He exhibits no edema.   Neurological: He is alert and oriented to person, place, and time.   Skin: Skin is warm and dry.   Psychiatric: He has a normal mood and affect. His behavior is normal.           PET stress:  CONCLUSIONS: NORMAL MYOCARDIAL PERFUSION PET STRESS TEST  1. The perfusion scan is free of evidence for myocardial ischemia or injury.   2. Resting wall motion is physiologic. Stress wall motion is physiologic.   3. LV function is normal at rest and stress.  (normal is >= 51%)  4. The ventricular volumes are normal at rest and stress.   5. The extracardiac distribution of radioactivity is normal.   6. There was no previous study available to compare.    Echocardiogram:  CONCLUSIONS     1 - Normal left ventricular systolic function (EF 60-65%).     2 - No wall motion abnormalities.     3 - Concentric remodeling.     4 - Impaired LV relaxation, elevated LAP (grade 2 diastolic dysfunction).     5 - Trivial mitral regurgitation.     Holter within normal limits    LDL-99    Assessment:       1. Coronary artery disease involving native coronary artery of native heart without angina pectoris    2. Essential hypertension    3. Mixed hyperlipidemia    4. Uncontrolled type 2 diabetes mellitus without complication, with long-term current use of insulin         Plan:        -Mainly reassurance in light of testing  -Known coronary artery disease with one revascularized branch vessel disease post CABG  -Continue risk factor modification i.e. diet and exercise as tolerated  -PAD screen at next visit    Return to clinic in 6 months

## 2017-10-19 RX ORDER — INSULIN GLARGINE 100 [IU]/ML
13 INJECTION, SOLUTION SUBCUTANEOUS EVERY MORNING
Qty: 3.9 ML | Refills: 2 | Status: SHIPPED | OUTPATIENT
Start: 2017-10-19 | End: 2018-06-21 | Stop reason: SDUPTHER

## 2017-10-20 DIAGNOSIS — E11.9 DIABETES MELLITUS WITHOUT COMPLICATION: ICD-10-CM

## 2017-10-24 ENCOUNTER — CLINICAL SUPPORT (OUTPATIENT)
Dept: REHABILITATION | Facility: HOSPITAL | Age: 67
End: 2017-10-24
Attending: PAIN MEDICINE
Payer: MEDICARE

## 2017-10-24 DIAGNOSIS — R29.898 DECREASED STRENGTH OF UPPER EXTREMITY: ICD-10-CM

## 2017-10-24 DIAGNOSIS — G89.29 CHRONIC MIDLINE LOW BACK PAIN WITHOUT SCIATICA: ICD-10-CM

## 2017-10-24 DIAGNOSIS — G89.29 CHRONIC LEFT SHOULDER PAIN: ICD-10-CM

## 2017-10-24 DIAGNOSIS — M25.512 CHRONIC LEFT SHOULDER PAIN: ICD-10-CM

## 2017-10-24 DIAGNOSIS — M54.50 CHRONIC MIDLINE LOW BACK PAIN WITHOUT SCIATICA: ICD-10-CM

## 2017-10-24 DIAGNOSIS — M25.612 DECREASED RANGE OF MOTION OF LEFT SHOULDER: ICD-10-CM

## 2017-10-24 DIAGNOSIS — R29.3 POSTURE IMBALANCE: ICD-10-CM

## 2017-10-24 DIAGNOSIS — M62.89 MUSCLE TIGHTNESS: ICD-10-CM

## 2017-10-24 DIAGNOSIS — M62.81 MUSCLE WEAKNESS: ICD-10-CM

## 2017-10-24 PROCEDURE — 97110 THERAPEUTIC EXERCISES: CPT | Mod: PN

## 2017-10-24 NOTE — PROGRESS NOTES
"                                                    Physical Therapy Daily Note     Name: Driss Hernandez Jr.  Clinic Number: [id number]  Diagnosis:   Encounter Diagnoses   Name Primary?    Chronic midline low back pain without sciatica     Muscle weakness     Muscle tightness     Chronic left shoulder pain     Decreased strength of upper extremity     Decreased range of motion of left shoulder     Posture imbalance      Physician: Eze Byrd Jr*     Visit #: 4 of 20 (new referral; total visit 9) MAYEN: 12/31/17    Time In: 1400  Time Out: 1510  Total Treatment Time 1:1: 70 min (1:1 with PT for 30 min; 10 min heat)    Subjective     Pt reports: R foot and ankle pain/tingling is primary symptom. Feels R foot pain when sitting at home.  Pain Scale: Driss rates pain on a scale of 0-10 to be 1 currently.    Objective     Driss received individual therapeutic exercises to develop strength, endurance, ROM, flexibility, posture and core stabilization for 60 minutes including:    Nustep 5 min  Calf Stretch c/ incline board 4x30"  Shld Row c/ Blue TB 3x15    Dynamic HS Stretch c/ OH Reach 15x ea LE  Seated Trunk Flexion - 3-way 3 min  Standing Hip Ext/Abd 15x ea c/ YTB  Shuttle Squat 2 black 1 red 3x10  Shuttle Piriformis Str 2x30" ea    LTR c/ ball and opp UE reach 2x10 ea  DKTC c/ ball and B UE reach 2x10  Prone on Elbows 3 min  Prone Press-up 5x    Hamstring c/ strap 3x30" ea  TrA Brace 15x  TrA Brace c/ March 15x  Shld Ext c/ Blue TB 2x10  Shld IR/ER c/ Blue TB 3x10  Hip Flexor c/ strap 2x30" ea  R QL Stretch - 3x30"  Bridges 2x10  Piriformis Str 2x30"    Driss received the following manual therapy techniques: Joint mobilizations and Soft tissue Mobilization were applied to the: Lumbar region for 0 minutes including:  STM to B Lumbar Paraspinal, B QL  Manual Traction    Written Home Exercises Provided: Yes - Shld Row, Ext, IR, ER, Prone Row  Pt demo good understanding of the education provided. Driss " demonstrated good return demonstration of activities.     PT/PTA face to face conference performed during this session    Assessment     Patient tolerated treatment session very well. Pt with no foot pain provocation throughout entire treatment session. Pt with mild discomfort with performance of prone press-up activity due to increased extension positioning of lumbar spine, symptoms quickly resolved with cessation of activity. Good performance of hip strength and endurance training with increased resistance this session    This is a 67 y.o. male referred to outpatient physical therapy and presents with a medical diagnosis of L shoulder pain and demonstrates limitations as described in the problem list. Pt prognosis is Good. Pt will continue to benefit from skilled outpatient physical therapy to address the deficits listed in the problem list, provide pt/family education and to maximize pt's level of independence in the home and community environment.     Goals as follows:  Short Term GOALS: 4 weeks. Pt agrees with goals set.  1. Patient demonstrates independence with HEP.   2. Patient demonstrates independence with Postural Awareness.   3. Pt will demonstrate ability to reach L UE to shoulder-height shelf, no symptom provocation, appropriate movement pattern    4. Patient will report pain of 4/10 at worst, on 0-10 pain scale, with all activity  5. Pt will increase strength to 1/3 muscle grade or greater to improve tolerance to all functional activities pain free.      Plan     Certification Period: 10/2/17 - 11/2/18    Continue with established Plan of Care towards PT goals.    Therapist: Akhil Guajardo, PT  10/24/2017

## 2017-10-26 DIAGNOSIS — M47.816 LUMBAR SPONDYLOSIS: ICD-10-CM

## 2017-10-26 DIAGNOSIS — M48.061 SPINAL STENOSIS OF LUMBAR REGION, UNSPECIFIED WHETHER NEUROGENIC CLAUDICATION PRESENT: ICD-10-CM

## 2017-10-26 DIAGNOSIS — M51.36 DDD (DEGENERATIVE DISC DISEASE), LUMBAR: ICD-10-CM

## 2017-10-26 RX ORDER — TIZANIDINE 4 MG/1
16 TABLET ORAL NIGHTLY
Qty: 360 TABLET | Refills: 0 | Status: SHIPPED | OUTPATIENT
Start: 2017-10-26 | End: 2018-01-24 | Stop reason: SDUPTHER

## 2017-10-30 ENCOUNTER — CLINICAL SUPPORT (OUTPATIENT)
Dept: REHABILITATION | Facility: HOSPITAL | Age: 67
End: 2017-10-30
Attending: PAIN MEDICINE
Payer: MEDICARE

## 2017-10-30 DIAGNOSIS — R29.3 POSTURE IMBALANCE: ICD-10-CM

## 2017-10-30 DIAGNOSIS — G89.29 CHRONIC MIDLINE LOW BACK PAIN WITHOUT SCIATICA: ICD-10-CM

## 2017-10-30 DIAGNOSIS — G89.29 CHRONIC LEFT SHOULDER PAIN: ICD-10-CM

## 2017-10-30 DIAGNOSIS — M62.89 MUSCLE TIGHTNESS: ICD-10-CM

## 2017-10-30 DIAGNOSIS — M25.612 DECREASED RANGE OF MOTION OF LEFT SHOULDER: ICD-10-CM

## 2017-10-30 DIAGNOSIS — M62.81 MUSCLE WEAKNESS: ICD-10-CM

## 2017-10-30 DIAGNOSIS — R29.898 DECREASED STRENGTH OF UPPER EXTREMITY: ICD-10-CM

## 2017-10-30 DIAGNOSIS — M25.512 CHRONIC LEFT SHOULDER PAIN: ICD-10-CM

## 2017-10-30 DIAGNOSIS — M54.50 CHRONIC MIDLINE LOW BACK PAIN WITHOUT SCIATICA: ICD-10-CM

## 2017-10-30 PROCEDURE — 97110 THERAPEUTIC EXERCISES: CPT | Mod: PN

## 2017-10-30 NOTE — PROGRESS NOTES
"                                                    Physical Therapy Daily Note     Name: Driss Hernandez Jr.  Clinic Number: [id number]  Diagnosis:   Encounter Diagnoses   Name Primary?    Chronic midline low back pain without sciatica     Muscle weakness     Muscle tightness     Chronic left shoulder pain     Decreased strength of upper extremity     Decreased range of motion of left shoulder     Posture imbalance      Physician: Eze Byrd Jr*     Visit #: 5 of 20 (new referral; total visit 10) MAYEN: 12/31/17    Time In: 800  Time Out: 910  Total Treatment Time 1:1: 70 min (1:1 with PT for 30 min; 10 min heat)    Subjective     Pt reports: He is doing ok today. He will be moving large pieces of furniture after today's session.  Pain Scale: Driss rates pain on a scale of 0-10 to be 7 currently.    Objective     Driss received individual therapeutic exercises to develop strength, endurance, ROM, flexibility, posture and core stabilization for 60 minutes including:    Nustep 5 min  Calf Stretch c/ incline board 4x30"  Shld Row c/ Blue TB 3x15  +Anti-Rotation c/ GTB 15x ea  Seated TrA Brace 15x  Seated TrA Brace c/ March 15x    Dynamic HS Stretch c/ OH Reach 15x ea LE  Seated Trunk Flexion - 3-way 3 min  Standing Hip Ext/Abd 15x ea c/ YTB  Shuttle Squat 2 black 1 red 3x10  Shuttle Piriformis Str 2x30" ea    LTR c/ ball and opp UE reach 2x10 ea  DKTC c/ ball and B UE reach 2x10  Prone on Elbows 3 min  Prone Press-up 5x    Hamstring c/ strap 3x30" ea    Shld Ext c/ Blue TB 2x10  Shld IR/ER c/ Blue TB 3x10  Hip Flexor c/ strap 2x30" ea  R QL Stretch - 3x30"  Bridges 2x10  Piriformis Str 2x30"    Driss received the following manual therapy techniques: Joint mobilizations and Soft tissue Mobilization were applied to the: Lumbar region for 0 minutes including:  STM to B Lumbar Paraspinal, B QL  Manual Traction    Written Home Exercises Provided: Yes - Shld Row, Ext, IR, ER, Prone Row  Pt demo good " understanding of the education provided. Driss demonstrated good return demonstration of activities.     PT/PTA face to face conference performed during this session    Assessment     Patient tolerated treatment session very well. Pt with good response to progression of core strengthening with resistance band in standing this session, appropriate form maintained, no symptom provoked throughout. Pt with good therapeutic effect achieved with calf stretching, reports of relieved pain and improved muscle tightness.   This is a 67 y.o. male referred to outpatient physical therapy and presents with a medical diagnosis of L shoulder pain and demonstrates limitations as described in the problem list. Pt prognosis is Good. Pt will continue to benefit from skilled outpatient physical therapy to address the deficits listed in the problem list, provide pt/family education and to maximize pt's level of independence in the home and community environment.     Goals as follows:  Short Term GOALS: 4 weeks. Pt agrees with goals set.  1. Patient demonstrates independence with HEP.   2. Patient demonstrates independence with Postural Awareness.   3. Pt will demonstrate ability to reach L UE to shoulder-height shelf, no symptom provocation, appropriate movement pattern    4. Patient will report pain of 4/10 at worst, on 0-10 pain scale, with all activity  5. Pt will increase strength to 1/3 muscle grade or greater to improve tolerance to all functional activities pain free.      Plan     Certification Period: 10/2/17 - 11/2/18    Continue with established Plan of Care towards PT goals.    Therapist: Akhil Guajardo, PT  10/30/2017

## 2017-10-31 ENCOUNTER — PATIENT MESSAGE (OUTPATIENT)
Dept: FAMILY MEDICINE | Facility: CLINIC | Age: 67
End: 2017-10-31

## 2017-10-31 DIAGNOSIS — E78.49 OTHER HYPERLIPIDEMIA: ICD-10-CM

## 2017-10-31 DIAGNOSIS — I10 ESSENTIAL HYPERTENSION: Primary | ICD-10-CM

## 2017-11-01 RX ORDER — METOPROLOL SUCCINATE 25 MG/1
25 TABLET, EXTENDED RELEASE ORAL
Qty: 90 TABLET | Refills: 4 | Status: SHIPPED | OUTPATIENT
Start: 2017-11-01 | End: 2018-01-24 | Stop reason: SDUPTHER

## 2017-11-01 RX ORDER — FENOFIBRATE 160 MG/1
160 TABLET ORAL EVERY MORNING
Qty: 90 TABLET | Refills: 4 | Status: SHIPPED | OUTPATIENT
Start: 2017-11-01 | End: 2018-11-11 | Stop reason: SDUPTHER

## 2017-11-06 ENCOUNTER — CLINICAL SUPPORT (OUTPATIENT)
Dept: REHABILITATION | Facility: HOSPITAL | Age: 67
End: 2017-11-06
Attending: PAIN MEDICINE
Payer: MEDICARE

## 2017-11-06 DIAGNOSIS — M54.50 CHRONIC MIDLINE LOW BACK PAIN WITHOUT SCIATICA: ICD-10-CM

## 2017-11-06 DIAGNOSIS — M62.81 MUSCLE WEAKNESS: ICD-10-CM

## 2017-11-06 DIAGNOSIS — G89.29 CHRONIC LEFT SHOULDER PAIN: ICD-10-CM

## 2017-11-06 DIAGNOSIS — M25.512 CHRONIC LEFT SHOULDER PAIN: ICD-10-CM

## 2017-11-06 DIAGNOSIS — R29.898 DECREASED STRENGTH OF UPPER EXTREMITY: ICD-10-CM

## 2017-11-06 DIAGNOSIS — G89.29 CHRONIC MIDLINE LOW BACK PAIN WITHOUT SCIATICA: ICD-10-CM

## 2017-11-06 DIAGNOSIS — M62.89 MUSCLE TIGHTNESS: ICD-10-CM

## 2017-11-06 DIAGNOSIS — M25.612 DECREASED RANGE OF MOTION OF LEFT SHOULDER: ICD-10-CM

## 2017-11-06 DIAGNOSIS — R29.3 POSTURE IMBALANCE: ICD-10-CM

## 2017-11-06 PROCEDURE — 97110 THERAPEUTIC EXERCISES: CPT | Mod: PN

## 2017-11-06 PROCEDURE — G8979 MOBILITY GOAL STATUS: HCPCS | Mod: CJ,PN

## 2017-11-06 PROCEDURE — G8978 MOBILITY CURRENT STATUS: HCPCS | Mod: CJ,PN

## 2017-11-06 NOTE — PROGRESS NOTES
"                                                    Physical Therapy Daily Note     Name: Driss Hernandez Jr.  Clinic Number: [id number]  Diagnosis:   Encounter Diagnoses   Name Primary?    Chronic midline low back pain without sciatica     Muscle weakness     Muscle tightness     Chronic left shoulder pain     Decreased strength of upper extremity     Decreased range of motion of left shoulder     Posture imbalance      Physician: Eze Byrd Jr*     Visit #: 6 of 20 (new referral; total visit 11) MAYEN: 12/31/17    Time In: 800  Time Out: 910  Total Treatment Time 1:1: 70 min (1:1 with PT for 30 min; 10 min heat)    Subjective     Pt reports: He is not feeling significant pain currently. He felt pain yesterday while sitting. Pt experienced recent heaviness during breathing following intake of muscle relaxer medication..  Pain Scale: Driss rates pain on a scale of 0-10 to be 3 currently.    Objective     LUMBAR SPINE AROM:   Flexion: 60/35 - 25   Extension: 25/5- 20   Left Sidebend: 10   Right Sidebend: 10   Left Rotation: Decreased   Right Rotation: Decreased       LOWER EXTREMITY STRENGTH:    Left Right   Quadriceps 4+/5 4+/5   Hamstrings 4/5 p! lumbar 4/5 p! lumbar      Iliopsoas 4+/5 4/5    PGM 4/5 4-/5   Hip IR 4+/5 4+/5   Hip ER 4+/5 4+/5   Hip Ext 4/5 4/5   Ankle DF 4+/5 4+/5   Ankle PF 4+/5 4+/5       Driss received individual therapeutic exercises to develop strength, endurance, ROM, flexibility, posture and core stabilization for 60 minutes including:    Nustep 6 min  Calf Stretch c/ incline board 4x30"  Shld Row c/ Blue TB 3x15  Seated TrA Brace 15x  Seated TrA Brace c/ March 15x  Standing Anti-Rotation c/ GTB 15x ea  +Seated Anti-Rotation c/ GTB 2x15  +Seated Dying Bug c/ physioball 2 min    Dynamic HS Stretch c/ OH Reach 15x ea LE  Seated Trunk Flexion - 3-way 3 min  Prone Hip Ext 15x ea LE  Standing Hip Ext/Abd 15x ea c/ YTB  Shuttle Squat 2 black 1 red 3x10  Shuttle Piriformis Str 2x30" " "ea    LTR c/ ball and opp UE reach 2x10 ea  DKTC c/ ball and B UE reach 2x10  Prone on Elbows 3 min  Prone Press-up 5x    Hamstring c/ strap 3x30" ea    Shld Ext c/ Blue TB 2x10  Shld IR/ER c/ Blue TB 3x10  Hip Flexor c/ strap 2x30" ea  R QL Stretch - 3x30"  Bridges 2x10  Piriformis Str 2x30"    Driss received the following manual therapy techniques: Joint mobilizations and Soft tissue Mobilization were applied to the: Lumbar region for 0 minutes including:  STM to B Lumbar Paraspinal, B QL  Manual Traction    Written Home Exercises Provided: Yes - Shld Row, Ext, IR, ER, Prone Row  Pt demo good understanding of the education provided. Driss demonstrated good return demonstration of activities.     PT/PTA face to face conference performed during this session    Assessment     Patient tolerated treatment session very well, with progress note performed. Pt with minimal improvements in thoracolumbar AROM and LE strength during this episode of care, however Pt improvements in intensity/frequency of symptoms and decreased muscle tonicity/tightness noted since beginning treatment for lumbar pathology. Treatment sessions primarily focused on core/lumbar stabilization, lumbar and LE flexibility training rather than L UE strength/ROM in past ~1 month (6 treatments). Pt reports greatest symptom provocation while slouched sitting position, Pt with good response to increased core stabilization activities in seated and unstable sitting surfaces this session.    This is a 67 y.o. male referred to outpatient physical therapy and presents with a medical diagnosis of L shoulder pain and demonstrates limitations as described in the problem list. Pt prognosis is Good. Pt will continue to benefit from skilled outpatient physical therapy to address the deficits listed in the problem list, provide pt/family education and to maximize pt's level of independence in the home and community environment.     Goals as follows:  Short Term GOALS: 5 " weeks. Pt agrees with goals set.  1. Patient demonstrates independence with HEP. met  2. Patient demonstrates independence with Postural Awareness. met  3. Patient demonstrates independence with body mechanics. continue  4. Patient will report pain of 4/10 at worst, on 0-10 pain scale, with all activity not met, continue  5. Patient demonstrates increased lumbar active ROM by 5 degrees in all planes to improve tolerance to functional activities pain free. Not met, continue  6. Patient demonstrates increased strength BLE's by 1/3 muscle grade or greater to improve tolerance to functional activities pain free. Partially met, continue     Functional Limitations Reports - G Codes  Category: Mobility  Tool: FOTO Lumbar Survey  Score: 37% Limitation   Modifier  Impairment Limitation Restriction    CH  0 % impaired, limited or restricted    CI  @ least 1% but less than 20% impaired, limited or restricted    CJ  @ least 20%<40% impaired, limited or restricted    CK  @ least 40%<60% impaired, limited or restricted    CL  @ least 60% <80% impaired, limited or restricted    CM  @ least 80%<100% impaired limited or restricted    CN  100% impaired, limited or restricted     Current/: CJ = 20-40% Limitation   Goal/ : CJ = 20-40% Limitation    Plan     Certification Period: 10/2/17 - 11/2/18    Continue with established Plan of Care towards PT goals.    Therapist: Akhil Guajardo, PT  11/6/2017

## 2017-11-13 ENCOUNTER — TELEPHONE (OUTPATIENT)
Dept: PAIN MEDICINE | Facility: CLINIC | Age: 67
End: 2017-11-13

## 2017-11-13 ENCOUNTER — OFFICE VISIT (OUTPATIENT)
Dept: PAIN MEDICINE | Facility: CLINIC | Age: 67
End: 2017-11-13
Payer: MEDICARE

## 2017-11-13 VITALS
TEMPERATURE: 97 F | OXYGEN SATURATION: 100 % | WEIGHT: 197.19 LBS | SYSTOLIC BLOOD PRESSURE: 164 MMHG | HEART RATE: 66 BPM | DIASTOLIC BLOOD PRESSURE: 74 MMHG | RESPIRATION RATE: 17 BRPM | BODY MASS INDEX: 30.89 KG/M2

## 2017-11-13 DIAGNOSIS — M96.1 POSTLAMINECTOMY SYNDROME OF LUMBAR REGION: ICD-10-CM

## 2017-11-13 DIAGNOSIS — M54.16 LUMBAR RADICULOPATHY, RIGHT: ICD-10-CM

## 2017-11-13 DIAGNOSIS — M47.816 LUMBAR SPONDYLOSIS: Primary | ICD-10-CM

## 2017-11-13 DIAGNOSIS — M51.36 DDD (DEGENERATIVE DISC DISEASE), LUMBAR: ICD-10-CM

## 2017-11-13 PROBLEM — M51.369 DDD (DEGENERATIVE DISC DISEASE), LUMBAR: Status: ACTIVE | Noted: 2017-11-13

## 2017-11-13 PROCEDURE — 99499 UNLISTED E&M SERVICE: CPT | Mod: S$GLB,,, | Performed by: PAIN MEDICINE

## 2017-11-13 PROCEDURE — 99999 PR PBB SHADOW E&M-EST. PATIENT-LVL IV: CPT | Mod: PBBFAC,,, | Performed by: PAIN MEDICINE

## 2017-11-13 PROCEDURE — 99214 OFFICE O/P EST MOD 30 MIN: CPT | Mod: S$GLB,,, | Performed by: PAIN MEDICINE

## 2017-11-13 NOTE — PROGRESS NOTES
Mr. Naqvi will be scheduled for Right Transforaminal Epidural Steroid Injection L4 + L5 on 11/29/2017.  Reviewed the pre-procedure instructions listed below with Mr. Naqvi- copy also provided.  Instructed patient to notify clinic if he begins feeling ill, runs a fever, is prescribed antibiotics, and/or has any outpatient procedures within the two weeks leading up to this procedure.  Instructed patient to report to [facility], 2nd Floor Same Day Surgery.  All questions answered- patient verbalized understanding.  Medication clearance request will be sent to Dr. Hernandez to hold ASA 325mg x7 days prior to procedure.    Day of Procedure  - Ensure you have obtained an arrival time from the Pain Management Staff  o Procedure Area will call 1-3 days in advance with your arrival time.  Please check any voicemails received.  o If you arrive past your scheduled procedure time, you may be asked to reschedule your procedure.  - Ensure you have a  with you to remain present throughout your procedure for your safety.  o If you arrive without a responsible adult to stay with you and drive you home, you may be asked to reschedule your procedure.  - Take all of your prescribed medications (exceptions noted below) with a small amount of water  - This is NOT a fasting procedure, you may have a light meal before coming.  - Wear comfortable clothing (loose fitting pants).  - You may wear glasses, dentures, contact lenses, and/or hearing aids. Please remove all jewelry and metal hairpins.  - Notify the nurse during the intake process if you are allergic to any medications, if you are diabetic, or if you are not feeling well  - Contact the Pain Management Clinic with the following:  o A fever greater than 100° (degrees)   o Feel ill, have any type of infection, or are taking antibiotics now or within the two (2) weeks leading up to this procedure  o Have any outpatient procedures within the two (2) weeks leading up to  this procedure (colonoscopy, major dental work, etc.)    IF you are taking blood thinners: Only upon receiving clearance and notification from the Pain Management Department  7 Days Prior to Your Procedure:  - Stop taking Plavix/Clopridogrel, Effient/Prasugel   5 Days Prior to Your Procedure:  - Stop taking Coumadin/Warfarin.  An INR may be drawn prior to your procedure.  If labs are required, you will need to arrive earlier than your scheduled arrival time.  - Stop taking Pradaxa/Dabigatra,  Brilinta/ Ticagrelor  3 Days Prior to Your Procedure:  - Stop taking Xarelto/Rivaroxaban, Eliquis/Apixaban, Aggrenox/Dipyridamole, Reopro/Abciximab

## 2017-11-13 NOTE — PROGRESS NOTES
Subjective:     Patient ID: Driss Hernandez Jr. is a 67 y.o. male    Chief Complaint: Back Pain (Lower)      Referred by: No ref. provider found      HPI:    Interval History (11/13/17):  He returns today for follow up.  He reports that PT has been helpful for the low back pain. He still has significant right foot and ankle pain when sitting in a reclined postion. This is the most painful area. He also has low back pain that is constant but does not bother him as much. Otherwise he is doing well.       Driss Hernandez Jr. is a 67 y.o. male who presents today with chronic bilateral low back pain R>>>L. Pain started over 40 years ago. No inciting injury or event noted. Pain is located mainly on the right side of the lower lumbar paraspinals. About 5 weeks ago, patient began to have right lwoer extremity pain that he felt was related to his back pain, but this has subsided. He denies any numbness, tingling, weakness, or b/b dysfunction. The pain is described as dull, but can become sharp at times. Patient states that his pain is worse in the morning. He does not sleep well. States that he wakes up every 45 minutes. Has taken Tizanidine PRN in the pat, but cannot recall how it affected him. Probably has not taken in over a year. Cannot take NSAIDs due to decreased renal function. Has taken in the past with mild relief.  This pain is described in detail below.    Physical Therapy: Has completed course of PT and continues HEP    Non-pharmacologic Treatment: Nothing really helps         · TENS? No    Pain Medications:         · Currently taking: Gabapentin 500mg per day, Tizandine 16mg QHS    · Has tried in the past:  NSAIDs, Tylenol,     · Has not tried: Opioids, Tylenol, TCAs, SNRIs, topical creams    Blood thinners: ASA 325mg daily    Interventional Therapies: None    Relevant Surgeries: Previous right L4 hemilaminectomy    Affecting sleep? Yes    Affecting daily activities? yes    Depressive symptoms? no           · SI/HI? No    Work status: Unemployed    Pain Scores:    Best:       0/10  Worst:     8/10  Usually:   4/10  Today:    5/10    Review of Systems   Constitutional: Negative for activity change, appetite change, chills, fatigue, fever and unexpected weight change.   HENT: Negative for hearing loss.    Eyes: Negative for visual disturbance.   Respiratory: Negative for chest tightness and shortness of breath.    Cardiovascular: Negative for chest pain.   Gastrointestinal: Negative for abdominal pain, constipation, diarrhea, nausea and vomiting.   Genitourinary: Negative for difficulty urinating.   Musculoskeletal: Positive for back pain. Negative for gait problem and neck pain.   Skin: Negative for rash.   Neurological: Negative for dizziness, weakness, light-headedness, numbness and headaches.   Psychiatric/Behavioral: Positive for sleep disturbance. Negative for hallucinations and suicidal ideas. The patient is not nervous/anxious.        Past Medical History:   Diagnosis Date    Chronic midline low back pain without sciatica 10/2/2017    Diabetes mellitus with neurological manifestations, uncontrolled 1/24/2017    Diabetic polyneuropathy associated with type 2 diabetes mellitus 1/24/2017    Diabetic polyneuropathy associated with type 2 diabetes mellitus     Essential hypertension 1/24/2017    Gastroesophageal reflux disease 1/24/2017    Hyperlipidemia 1/24/2017    Insomnia 1/24/2017    Long-term insulin use 1/24/2017    Nuclear sclerosis of both eyes 8/24/2017    Obesity     Uncontrolled type 2 diabetes mellitus without complication, with long-term current use of insulin 1/24/2017       Past Surgical History:   Procedure Laterality Date    BACK SURGERY      2002    COLONOSCOPY N/A 2/21/2017    Procedure: COLONOSCOPY;  Surgeon: Robb Rosado MD;  Location: [facility];  Service: Endoscopy;  Laterality: N/A;    CORONARY ARTERY BYPASS GRAFT      March 2016    TONSILLECTOMY         Social History  "    Social History    Marital status:      Spouse name: N/A    Number of children: N/A    Years of education: N/A     Occupational History    Not on file.     Social History Main Topics    Smoking status: Never Smoker    Smokeless tobacco: Never Used    Alcohol use No    Drug use: No    Sexual activity: Yes     Partners: Female     Other Topics Concern    Not on file     Social History Narrative    No narrative on file       Review of patient's allergies indicates:   Allergen Reactions    Penicillins Other (See Comments)     Unknown reaction, Had a reaction as a child    Shrimp Itching     Hand itching       Current Outpatient Prescriptions on File Prior to Visit   Medication Sig Dispense Refill    ACCU-CHEK MOIZ CONTROL SOLN Soln       ACCU-CHEK MOIZ PLUS TEST STRP Strp       ACCU-CHEK SOFTCLIX LANCETS Misc       aspirin 325 MG tablet Take 325 mg by mouth every morning.       atorvastatin (LIPITOR) 40 MG tablet Take 40 mg by mouth every morning.       BD ALCOHOL SWABS PadM       BD INSULIN PEN NEEDLE UF MINI 31 gauge x 3/16" Ndle       fenofibrate 160 MG Tab Take 1 tablet (160 mg total) by mouth every morning. 90 tablet 4    gabapentin (NEURONTIN) 100 MG capsule Take by mouth 2 (two) times daily. Takes two 100 mg capsules in the morning and three 100 mg capsules with dinner      insulin aspart protamine-insulin aspart (NOVOLOG 70/30) 100 unit/mL (70-30) InPn pen Inject into the skin 2 (two) times daily before meals.      LANTUS SOLOSTAR 100 unit/mL (3 mL) InPn pen Inject 13 Units into the skin every morning. 3.9 mL 2    lisinopril (PRINIVIL,ZESTRIL) 40 MG tablet Take 40 mg by mouth daily with dinner or evening meal.       metformin (GLUCOPHAGE-XR) 500 MG 24 hr tablet Take 1,000 mg by mouth 2 (two) times daily with meals. Takes two 500 mg twice a day      metoprolol succinate (TOPROL-XL) 25 MG 24 hr tablet Take 1 tablet (25 mg total) by mouth daily with dinner or evening meal. 90 " tablet 4    nateglinide (STARLIX) 60 MG tablet Take 60 mg by mouth 3 (three) times daily before meals.      omeprazole (PRILOSEC) 20 MG capsule Take 20 mg by mouth daily with dinner or evening meal.       sildenafil (REVATIO) 20 mg Tab 1-5 pills at a time per day prn 30 tablet 11    tiZANidine (ZANAFLEX) 4 MG tablet Take 4 tablets (16 mg total) by mouth every evening. 360 tablet 0    triazolam (HALCION) 0.25 MG Tab TK 1 T PO QHS, prn  5     No current facility-administered medications on file prior to visit.        Objective:      BP (!) 164/74 (BP Location: Left arm, Patient Position: Sitting)   Pulse 66   Temp 97.3 °F (36.3 °C) (Oral)   Resp 17   Wt 89.4 kg (197 lb 3.2 oz)   SpO2 100%   BMI 30.89 kg/m²     Exam:  GEN:  Well developed, well nourished.  No acute distress. No pain behavior.  HEENT:  No trauma.  Mucous membranes moist.  Nares patent bilaterally.  PSYCH: Normal affect. Thought content appropriate.  CHEST:  Breathing symmetric.  No audible wheezing.  ABD: Soft, non-tender, non-distended.  SKIN:  Warm, pink, dry.  No rash on exposed areas.    EXT:  No cyanosis, clubbing, or edema.  No color change or changes in nail or hair growth.  NEURO/MUSCULOSKELETAL:  Fully alert, oriented, and appropriate. Speech normal ivania. No cranial nerve deficits.   Gait: Normal.  No trendelenburg sign bilaterally.   Motor Strength: 5/5 motor strength throughout lower extremities.   Sensory: No sensory deficit in the lower extremities.   Reflexes:  1+ and symmetric throughout.  Downgoing Babinski's bilaterally.  No clonus or spasticity.  L-Spine:  Slightly limited ROM with mild pain on flexion and extension. equivocal facet loading bilaterally.  Negative SLR bilaterally.    SI Joint/Hip: Negative BENJAMIN bilaterally.  Negative FADIR bilaterally.  No TTP over lumbar paraspinals, bilateral SI joints, hips, piriformis muscles, or GTB. Very tight hamstrings and lumbar paraspinals noted on  exam.        Imaging:  Narrative     MRI lumbar spine without contrast.    Comparison: None.    Technique: Sagittal T1, T2, stir and axial T2 and T1-weighted images were obtained.  No IV contrast was utilized.    Results: The alignment of the lumbar spine is normal.  The vertebral body heights are well-maintained.  There is mild disc desiccation at all level.  Moderate disc space narrowing at L4-L5.  No evidence of malignant bone marrow replacement process or infection.  The conus medullaris terminates in good position.    T12-L1: Mild diffuse disc bulge, no central canal or foraminal stenosis.  The facet joints appear normal.    L1-L2: Mild diffuse disc bulge, mild facet joint degenerative change, no central canal or foraminal stenosis.    L2-L3: Mild diffuse disc bulge, moderate facet joint osseous hypertrophy.  No central canal or significant foramina narrowing.    L3-L4: Mild diffuse disc bulge, moderate facet joint osseous hypertrophy.  There is no central canal stenosis.  There is no greater than mild foraminal narrowing.    L4-L5: Laminectomy defect on the right.  There is a diffuse disc bulge and what appears to be a right paracentral disc extrusion which could abut the descending and exiting nerve roots , there is good decompression of the canal posteriorly.  There is moderate to severe bilateral foraminal narrowing.    L5-S1: Diffuse disc bulge and moderate to severe facet joint osseous hypertrophy, no central canal stenosis.  There is severe bilateral foramina narrowing.    The paraspinal soft tissues appear normal.   Impression         Postoperative changes of right laminectomy at L4-L5.  There is a diffuse disc bulge with a right paracentral/foraminal disc extrusion likely to abut on the descending and right exiting nerve root.  There is moderate to severe bilateral foraminal narrowing at this level.  There is severe bilateral foraminal also seen at L5-S1 due to facet joint osseous  hypertrophy.          Electronically signed by: ISAI FERNANDEZ MD  Date: 08/23/17  Time: 08:40          Assessment:       Encounter Diagnoses   Name Primary?    Lumbar spondylosis Yes    DDD (degenerative disc disease), lumbar     Postlaminectomy syndrome of lumbar region     Lumbar radiculopathy, right          Plan:       Driss was seen today for back pain.    Diagnoses and all orders for this visit:    Lumbar spondylosis    DDD (degenerative disc disease), lumbar    Postlaminectomy syndrome of lumbar region    Lumbar radiculopathy, right        Driss Burttima Akbar is a 67 y.o. male with improved chronic low back pain. Improved ROM and muscle tone in low back. Still with what appear to be chronic radicular pain in the right L4 and L5 distribution. May also have facetogenic low back pain.     1. Continue HEP.  2. Schedule for right L4 and L5 TFESI.  3. Continue Tizanidine 16mg QHS as this appear to be helping.  4. RTC 2 weeks after injections. May consider lumbar MBB/RFA

## 2017-11-13 NOTE — TELEPHONE ENCOUNTER
Informed patient that Dr. Hernandez has cleared him to hold the ASA x7 days prior to procedure- stop taking on 11/2217.  Verbalized understanding.     ----- Message from Meera Vega MA sent at 11/13/2017 11:09 AM CST -----  Regarding: FW: Cardiac Medication Clearance  MD Meera Lozano MA         Ok to hold asa for procedure       ----- Message -----  From: Kelvin Hernandez MD  Sent: 11/13/2017  11:06 AM  To: Meera Vega MA  Subject: RE: Cardiac Medication Clearance                 Ok to hold asa for procedure      --David    ----- Message -----  From: Meera Vega MA  Sent: 11/13/2017  10:27 AM  To: Kelvin Hernandez MD  Subject: FW: Cardiac Medication Clearance                     ----- Message -----  From: Tonia Archer RN  Sent: 11/13/2017  10:15 AM  To: David SPIVEY Staff  Subject: Cardiac Medication Clearance                     Dr. Hernandez,    Mr. Hernandez is scheduled to have a Right Transforaminal Epidural Steroid Injection on 11/29/2017.  Dr. Byrd is requesting that the patient stop taking Aspirin 325 mg seven days prior to this procedure.  Please indicate whether or not he is able to stop taking the medication listed above.  Feel free to contact the clinic with any questions or concerns you may have.      Thank you in advance for your time and expertise,    CINDY Yu, RN

## 2017-11-21 ENCOUNTER — TELEPHONE (OUTPATIENT)
Dept: PAIN MEDICINE | Facility: CLINIC | Age: 67
End: 2017-11-21

## 2017-11-21 NOTE — TELEPHONE ENCOUNTER
Message left reminding Mr. Hernandez that we received clearance from Dr. Hernandez that he is able to hold ASA 325mg for 7 days prior to Pain Management procedure scheduled on 11/29/2017.  Confirmed that last dose will be 11/21/2017.  Clinic phone number included should patient have any questions or concerns.

## 2017-11-29 ENCOUNTER — HOSPITAL ENCOUNTER (OUTPATIENT)
Facility: HOSPITAL | Age: 67
Discharge: HOME | End: 2017-11-29
Attending: PAIN MEDICINE | Admitting: PAIN MEDICINE
Payer: MEDICARE

## 2017-11-29 ENCOUNTER — SURGERY (OUTPATIENT)
Age: 67
End: 2017-11-29

## 2017-11-29 VITALS
SYSTOLIC BLOOD PRESSURE: 145 MMHG | HEIGHT: 67 IN | HEART RATE: 60 BPM | RESPIRATION RATE: 18 BRPM | OXYGEN SATURATION: 98 % | BODY MASS INDEX: 30.92 KG/M2 | WEIGHT: 197 LBS | TEMPERATURE: 97 F | DIASTOLIC BLOOD PRESSURE: 79 MMHG

## 2017-11-29 DIAGNOSIS — M54.16 LUMBAR RADICULOPATHY, RIGHT: Primary | ICD-10-CM

## 2017-11-29 DIAGNOSIS — M54.16 LUMBAR RADICULOPATHY: ICD-10-CM

## 2017-11-29 LAB — POCT GLUCOSE: 93 MG/DL (ref 70–110)

## 2017-11-29 PROCEDURE — 64483 NJX AA&/STRD TFRM EPI L/S 1: CPT | Mod: RT,,, | Performed by: PAIN MEDICINE

## 2017-11-29 PROCEDURE — 71000015 HC POSTOP RECOV 1ST HR: Performed by: PAIN MEDICINE

## 2017-11-29 PROCEDURE — 64484 NJX AA&/STRD TFRM EPI L/S EA: CPT | Performed by: PAIN MEDICINE

## 2017-11-29 PROCEDURE — 25000003 PHARM REV CODE 250: Performed by: PAIN MEDICINE

## 2017-11-29 PROCEDURE — 82962 GLUCOSE BLOOD TEST: CPT | Performed by: PAIN MEDICINE

## 2017-11-29 PROCEDURE — 63600175 PHARM REV CODE 636 W HCPCS: Performed by: PAIN MEDICINE

## 2017-11-29 PROCEDURE — 25500020 PHARM REV CODE 255: Performed by: PAIN MEDICINE

## 2017-11-29 PROCEDURE — 64483 NJX AA&/STRD TFRM EPI L/S 1: CPT | Performed by: PAIN MEDICINE

## 2017-11-29 RX ORDER — LIDOCAINE HYDROCHLORIDE 20 MG/ML
10 INJECTION, SOLUTION INFILTRATION; PERINEURAL ONCE
Status: COMPLETED | OUTPATIENT
Start: 2017-11-29 | End: 2017-11-29

## 2017-11-29 RX ORDER — LIDOCAINE HYDROCHLORIDE 10 MG/ML
1 INJECTION, SOLUTION EPIDURAL; INFILTRATION; INTRACAUDAL; PERINEURAL ONCE
Status: COMPLETED | OUTPATIENT
Start: 2017-11-29 | End: 2017-11-29

## 2017-11-29 RX ORDER — ALPRAZOLAM 0.5 MG/1
1 TABLET, ORALLY DISINTEGRATING ORAL ONCE AS NEEDED
Status: COMPLETED | OUTPATIENT
Start: 2017-11-29 | End: 2017-11-29

## 2017-11-29 RX ORDER — DEXAMETHASONE SODIUM PHOSPHATE 10 MG/ML
10 INJECTION INTRAMUSCULAR; INTRAVENOUS ONCE
Status: COMPLETED | OUTPATIENT
Start: 2017-11-29 | End: 2017-11-29

## 2017-11-29 RX ADMIN — IOHEXOL 5 ML: 300 INJECTION, SOLUTION INTRAVENOUS at 01:11

## 2017-11-29 RX ADMIN — LIDOCAINE HYDROCHLORIDE 10 ML: 20 INJECTION, SOLUTION INFILTRATION; PERINEURAL at 01:11

## 2017-11-29 RX ADMIN — DEXAMETHASONE SODIUM PHOSPHATE 10 MG: 10 INJECTION, SOLUTION INTRAMUSCULAR; INTRAVENOUS at 01:11

## 2017-11-29 RX ADMIN — LIDOCAINE HYDROCHLORIDE 10 MG: 10 INJECTION, SOLUTION EPIDURAL; INFILTRATION; INTRACAUDAL; PERINEURAL at 01:11

## 2017-11-29 RX ADMIN — ALPRAZOLAM 1 MG: 0.5 TABLET, ORALLY DISINTEGRATING ORAL at 12:11

## 2017-11-29 NOTE — H&P (VIEW-ONLY)
Subjective:     Patient ID: Driss Hernandez Jr. is a 67 y.o. male    Chief Complaint: Back Pain (Lower)      Referred by: No ref. provider found      HPI:    Interval History (11/13/17):  He returns today for follow up.  He reports that PT has been helpful for the low back pain. He still has significant right foot and ankle pain when sitting in a reclined postion. This is the most painful area. He also has low back pain that is constant but does not bother him as much. Otherwise he is doing well.       Driss Hernandez Jr. is a 67 y.o. male who presents today with chronic bilateral low back pain R>>>L. Pain started over 40 years ago. No inciting injury or event noted. Pain is located mainly on the right side of the lower lumbar paraspinals. About 5 weeks ago, patient began to have right lwoer extremity pain that he felt was related to his back pain, but this has subsided. He denies any numbness, tingling, weakness, or b/b dysfunction. The pain is described as dull, but can become sharp at times. Patient states that his pain is worse in the morning. He does not sleep well. States that he wakes up every 45 minutes. Has taken Tizanidine PRN in the pat, but cannot recall how it affected him. Probably has not taken in over a year. Cannot take NSAIDs due to decreased renal function. Has taken in the past with mild relief.  This pain is described in detail below.    Physical Therapy: Has completed course of PT and continues HEP    Non-pharmacologic Treatment: Nothing really helps         · TENS? No    Pain Medications:         · Currently taking: Gabapentin 500mg per day, Tizandine 16mg QHS    · Has tried in the past:  NSAIDs, Tylenol,     · Has not tried: Opioids, Tylenol, TCAs, SNRIs, topical creams    Blood thinners: ASA 325mg daily    Interventional Therapies: None    Relevant Surgeries: Previous right L4 hemilaminectomy    Affecting sleep? Yes    Affecting daily activities? yes    Depressive symptoms? no           · SI/HI? No    Work status: Unemployed    Pain Scores:    Best:       0/10  Worst:     8/10  Usually:   4/10  Today:    5/10    Review of Systems   Constitutional: Negative for activity change, appetite change, chills, fatigue, fever and unexpected weight change.   HENT: Negative for hearing loss.    Eyes: Negative for visual disturbance.   Respiratory: Negative for chest tightness and shortness of breath.    Cardiovascular: Negative for chest pain.   Gastrointestinal: Negative for abdominal pain, constipation, diarrhea, nausea and vomiting.   Genitourinary: Negative for difficulty urinating.   Musculoskeletal: Positive for back pain. Negative for gait problem and neck pain.   Skin: Negative for rash.   Neurological: Negative for dizziness, weakness, light-headedness, numbness and headaches.   Psychiatric/Behavioral: Positive for sleep disturbance. Negative for hallucinations and suicidal ideas. The patient is not nervous/anxious.        Past Medical History:   Diagnosis Date    Chronic midline low back pain without sciatica 10/2/2017    Diabetes mellitus with neurological manifestations, uncontrolled 1/24/2017    Diabetic polyneuropathy associated with type 2 diabetes mellitus 1/24/2017    Diabetic polyneuropathy associated with type 2 diabetes mellitus     Essential hypertension 1/24/2017    Gastroesophageal reflux disease 1/24/2017    Hyperlipidemia 1/24/2017    Insomnia 1/24/2017    Long-term insulin use 1/24/2017    Nuclear sclerosis of both eyes 8/24/2017    Obesity     Uncontrolled type 2 diabetes mellitus without complication, with long-term current use of insulin 1/24/2017       Past Surgical History:   Procedure Laterality Date    BACK SURGERY      2002    COLONOSCOPY N/A 2/21/2017    Procedure: COLONOSCOPY;  Surgeon: Robb Rosado MD;  Location: [facility];  Service: Endoscopy;  Laterality: N/A;    CORONARY ARTERY BYPASS GRAFT      March 2016    TONSILLECTOMY         Social History  "    Social History    Marital status:      Spouse name: N/A    Number of children: N/A    Years of education: N/A     Occupational History    Not on file.     Social History Main Topics    Smoking status: Never Smoker    Smokeless tobacco: Never Used    Alcohol use No    Drug use: No    Sexual activity: Yes     Partners: Female     Other Topics Concern    Not on file     Social History Narrative    No narrative on file       Review of patient's allergies indicates:   Allergen Reactions    Penicillins Other (See Comments)     Unknown reaction, Had a reaction as a child    Shrimp Itching     Hand itching       Current Outpatient Prescriptions on File Prior to Visit   Medication Sig Dispense Refill    ACCU-CHEK MOIZ CONTROL SOLN Soln       ACCU-CHEK MOIZ PLUS TEST STRP Strp       ACCU-CHEK SOFTCLIX LANCETS Misc       aspirin 325 MG tablet Take 325 mg by mouth every morning.       atorvastatin (LIPITOR) 40 MG tablet Take 40 mg by mouth every morning.       BD ALCOHOL SWABS PadM       BD INSULIN PEN NEEDLE UF MINI 31 gauge x 3/16" Ndle       fenofibrate 160 MG Tab Take 1 tablet (160 mg total) by mouth every morning. 90 tablet 4    gabapentin (NEURONTIN) 100 MG capsule Take by mouth 2 (two) times daily. Takes two 100 mg capsules in the morning and three 100 mg capsules with dinner      insulin aspart protamine-insulin aspart (NOVOLOG 70/30) 100 unit/mL (70-30) InPn pen Inject into the skin 2 (two) times daily before meals.      LANTUS SOLOSTAR 100 unit/mL (3 mL) InPn pen Inject 13 Units into the skin every morning. 3.9 mL 2    lisinopril (PRINIVIL,ZESTRIL) 40 MG tablet Take 40 mg by mouth daily with dinner or evening meal.       metformin (GLUCOPHAGE-XR) 500 MG 24 hr tablet Take 1,000 mg by mouth 2 (two) times daily with meals. Takes two 500 mg twice a day      metoprolol succinate (TOPROL-XL) 25 MG 24 hr tablet Take 1 tablet (25 mg total) by mouth daily with dinner or evening meal. 90 " tablet 4    nateglinide (STARLIX) 60 MG tablet Take 60 mg by mouth 3 (three) times daily before meals.      omeprazole (PRILOSEC) 20 MG capsule Take 20 mg by mouth daily with dinner or evening meal.       sildenafil (REVATIO) 20 mg Tab 1-5 pills at a time per day prn 30 tablet 11    tiZANidine (ZANAFLEX) 4 MG tablet Take 4 tablets (16 mg total) by mouth every evening. 360 tablet 0    triazolam (HALCION) 0.25 MG Tab TK 1 T PO QHS, prn  5     No current facility-administered medications on file prior to visit.        Objective:      BP (!) 164/74 (BP Location: Left arm, Patient Position: Sitting)   Pulse 66   Temp 97.3 °F (36.3 °C) (Oral)   Resp 17   Wt 89.4 kg (197 lb 3.2 oz)   SpO2 100%   BMI 30.89 kg/m²     Exam:  GEN:  Well developed, well nourished.  No acute distress. No pain behavior.  HEENT:  No trauma.  Mucous membranes moist.  Nares patent bilaterally.  PSYCH: Normal affect. Thought content appropriate.  CHEST:  Breathing symmetric.  No audible wheezing.  ABD: Soft, non-tender, non-distended.  SKIN:  Warm, pink, dry.  No rash on exposed areas.    EXT:  No cyanosis, clubbing, or edema.  No color change or changes in nail or hair growth.  NEURO/MUSCULOSKELETAL:  Fully alert, oriented, and appropriate. Speech normal ivania. No cranial nerve deficits.   Gait: Normal.  No trendelenburg sign bilaterally.   Motor Strength: 5/5 motor strength throughout lower extremities.   Sensory: No sensory deficit in the lower extremities.   Reflexes:  1+ and symmetric throughout.  Downgoing Babinski's bilaterally.  No clonus or spasticity.  L-Spine:  Slightly limited ROM with mild pain on flexion and extension. equivocal facet loading bilaterally.  Negative SLR bilaterally.    SI Joint/Hip: Negative BENJAMIN bilaterally.  Negative FADIR bilaterally.  No TTP over lumbar paraspinals, bilateral SI joints, hips, piriformis muscles, or GTB. Very tight hamstrings and lumbar paraspinals noted on  exam.        Imaging:  Narrative     MRI lumbar spine without contrast.    Comparison: None.    Technique: Sagittal T1, T2, stir and axial T2 and T1-weighted images were obtained.  No IV contrast was utilized.    Results: The alignment of the lumbar spine is normal.  The vertebral body heights are well-maintained.  There is mild disc desiccation at all level.  Moderate disc space narrowing at L4-L5.  No evidence of malignant bone marrow replacement process or infection.  The conus medullaris terminates in good position.    T12-L1: Mild diffuse disc bulge, no central canal or foraminal stenosis.  The facet joints appear normal.    L1-L2: Mild diffuse disc bulge, mild facet joint degenerative change, no central canal or foraminal stenosis.    L2-L3: Mild diffuse disc bulge, moderate facet joint osseous hypertrophy.  No central canal or significant foramina narrowing.    L3-L4: Mild diffuse disc bulge, moderate facet joint osseous hypertrophy.  There is no central canal stenosis.  There is no greater than mild foraminal narrowing.    L4-L5: Laminectomy defect on the right.  There is a diffuse disc bulge and what appears to be a right paracentral disc extrusion which could abut the descending and exiting nerve roots , there is good decompression of the canal posteriorly.  There is moderate to severe bilateral foraminal narrowing.    L5-S1: Diffuse disc bulge and moderate to severe facet joint osseous hypertrophy, no central canal stenosis.  There is severe bilateral foramina narrowing.    The paraspinal soft tissues appear normal.   Impression         Postoperative changes of right laminectomy at L4-L5.  There is a diffuse disc bulge with a right paracentral/foraminal disc extrusion likely to abut on the descending and right exiting nerve root.  There is moderate to severe bilateral foraminal narrowing at this level.  There is severe bilateral foraminal also seen at L5-S1 due to facet joint osseous  hypertrophy.          Electronically signed by: ISAI FERNANDEZ MD  Date: 08/23/17  Time: 08:40          Assessment:       Encounter Diagnoses   Name Primary?    Lumbar spondylosis Yes    DDD (degenerative disc disease), lumbar     Postlaminectomy syndrome of lumbar region     Lumbar radiculopathy, right          Plan:       Driss was seen today for back pain.    Diagnoses and all orders for this visit:    Lumbar spondylosis    DDD (degenerative disc disease), lumbar    Postlaminectomy syndrome of lumbar region    Lumbar radiculopathy, right        Driss Burttima Akbar is a 67 y.o. male with improved chronic low back pain. Improved ROM and muscle tone in low back. Still with what appear to be chronic radicular pain in the right L4 and L5 distribution. May also have facetogenic low back pain.     1. Continue HEP.  2. Schedule for right L4 and L5 TFESI.  3. Continue Tizanidine 16mg QHS as this appear to be helping.  4. RTC 2 weeks after injections. May consider lumbar MBB/RFA

## 2017-11-29 NOTE — OP NOTE
Lumbar transforaminal ANNEI    Time-out taken to identify patient and procedure side prior to starting the procedure.   I attest that I have reviewed the patient's home medications prior to the procedure and no contraindication have been identified. I  re-evaluated the patient after the patient was positioned for the procedure in the procedure room immediately before the procedural time-out. The vital signs are current and represent the current state of the patient which has not significantly changed since the preprocedure assessment.                              Date of Service: 11/29/2017    PCP: Jono Willoughby MD    Referring Physician:                                     PROCEDURE: Right L4 transforaminal epidural steroid injection under fluoroscopy Right L5 level was also attempted, but unable to get epidural access to L5-S1 foramen due to degenerative changes.    REASON FOR PROCEDURE: Right Lumbar radiculopathy, right [M54.16]    PHYSICIAN: Eze Byrd MD  ASSISTANTS:None    MEDICATIONS INJECTED:  Preservative-free dexamethasone 10mg, Xylocaine 1% MPF 3-5ml. 3ml per level. Preservative free, sterile normal saline is used to get larger volume as needed.  LOCAL ANESTHETIC INJECTED:  Xylocaine 1% 3ml per site.    SEDATION MEDICATIONS: None  ESTIMATED BLOOD LOSS:  None.    COMPLICATIONS:  None.    TECHNIQUE:   Laying in a prone position, the patient was prepped and draped in the usual sterile fashion using ChloraPrep and fenestrated drape.  The area to be injected was determined under fluoroscopic guidance.  Local anesthetic was given by raising a wheel and going down to the hub of a 27-gauge 1.25 inch needle.  The 3.5inch 22-gauge spinal needle was introduced towards the transverse process of each above named nerve root level.  The needle was walked medially then hinged into the neural foramen.  Omnipaque was injected to confirm appropriate placement and that there was no vascular runoff.  The medication  was then injected after applying negative pressure. The patient tolerated the procedure well.    PAIN BEFORE THE PROCEDURE: 5/10.    PAIN AFTER THE PROCEDURE: 5/10.    The patient was monitored after the procedure.  Patient was given post procedure and discharge instructions to follow at home.  We will see the patient back in two weeks or the patient may call to inform of status. The patient was discharged in a stable condition.

## 2017-11-29 NOTE — OR NURSING
MD informed pt that he was unable to perform the second level (L5) as originally planned- L4 was done.

## 2017-11-29 NOTE — DISCHARGE INSTRUCTIONS
Home Care Instructions Pain Management:    1. DIET:   You may resume your normal diet today.   2. BATHING:   You may shower with luke warm water.  3. DRESSING:   You may remove your bandage today.   4. ACTIVITY LEVEL:   You may resume your normal activities 24 hrs after your procedure.  5. MEDICATIONS:   You may resume your normal medications today.   6. SPECIAL INSTRUCTIONS:   No heat to the injection site for 24 hrs including, bath or shower, heating pad, moist heat, or hot tubs.    Use ice pack to injection site for any pain or discomfort.  Apply ice packs for 20 minute intervals as needed.   If you have received any sedatives by mouth today you may not drive for 12 hours.    If you have received any sedation through your IV, you may not drive for 24 hrs.     PLEASE CALL YOUR DOCTOR IF:  1. Redness or swelling around the injection site.  2. Fever of 101 degrees  3. Drainage (pus) from the injection site.  4. For any continuous bleeding (some dried blood over the incision is normal.)    FOR EMERGENCIES:   If any unusual problems or difficulties occur during clinic hours, call [phone] or [phone].           Lumbar Epidural Injection: Recovery at Home    After a lumbar epidural injection, you dont need to stay in bed when you get home. In fact, its best to walk around if you feel up to it. Just be careful about being too active. Even if you feel better right away, avoid activities that may strain your back. And follow up on all treatment with your doctor.  What to know about pain relief  Keep in mind that some people feel more pain at first. It usually goes away within a few days. You may also have headaches or trouble sleeping, but if these symptoms are severe, you should call your doctor right away. These should also go away within a few days. In general:  · An injection to reduce inflammation takes a few days to work, sometimes even up to a week. There may even be more pain at first.  · An injection to help  locate the source of pain may give only brief pain relief. Later, youll feel the same as you did before the injection.  Tips for recovery  Whether you were injected for pain relief or diagnosis, these tips will help you recover:  · Take walks when you feel up to it.  · Rest if needed, but get up and move around after sitting for half an hour.  · Dont exercise vigorously.  · Dont drive the day of the procedure or until your doctor says its OK.  · Return to work or other activities when your doctor says youre ready.  When to call your doctor  Call right away if you notice any of the following symptoms:  · Severe pain or headache  · Loss of bladder or bowel control  · Fever or chills  · Redness or swelling around the injection site       Date Last Reviewed: 11/1/2015 © 2000-2017 [employer]. [address] [place] [address]. All rights reserved. This information is not intended as a substitute for professional medical care. Always follow your healthcare professional's instructions.        Fall Prevention  Millions of people fall every year and injure themselves. You may have had anesthesia or sedation which may increase your risk of falling. You may have health issues that put you at an increased risk of falling.     Here are ways to reduce your risk of falling.  ·   · Make your home safe by keeping walkways clear of objects you may trip over.  · Use non-slip pads under rugs. Do not use area rugs or small throw rugs.  · Use non-slip mats in bathtubs and showers.  · Install handrails and lights on staircases.  · Do not walk in poorly lit areas.  · Do not stand on chairs or wobbly ladders.  · Use caution when reaching overhead or looking upward. This position can cause a loss of balance.  · Be sure your shoes fit properly, have non-slip bottoms and are in good condition.   · Wear shoes both inside and out. Avoid going barefoot or wearing slippers.  · Be cautious when going up and down stairs,  curbs, and when walking on uneven sidewalks.  · If your balance is poor, consider using a cane or walker.  · If your fall was related to alcohol use, stop or limit alcohol intake.   · If your fall was related to use of sleeping medicines, talk to your doctor about this. You may need to reduce your dosage at bedtime if you awaken during the night to go to the bathroom.    · To reduce the need for nighttime bathroom trips:  ¨ Avoid drinking fluids for several hours before going to bed  ¨ Empty your bladder before going to bed  ¨ Men can keep a urinal at the bedside  · Stay as active as you can. Balance, flexibility, strength, and endurance all come from exercise. They all play a role in preventing falls. Ask your healthcare provider which types of activity are right for you.  · Get your vision checked on a regular basis.  · If you have pets, know where they are before you stand up or walk so you don't trip over them.  · Use night lights.

## 2017-11-30 ENCOUNTER — PATIENT MESSAGE (OUTPATIENT)
Dept: FAMILY MEDICINE | Facility: CLINIC | Age: 67
End: 2017-11-30

## 2017-11-30 ENCOUNTER — TELEPHONE (OUTPATIENT)
Dept: FAMILY MEDICINE | Facility: CLINIC | Age: 67
End: 2017-11-30

## 2017-12-01 RX ORDER — BLOOD SUGAR DIAGNOSTIC
STRIP MISCELLANEOUS
Status: CANCELLED | OUTPATIENT
Start: 2017-12-01

## 2017-12-03 ENCOUNTER — PATIENT MESSAGE (OUTPATIENT)
Dept: FAMILY MEDICINE | Facility: CLINIC | Age: 67
End: 2017-12-03

## 2017-12-05 RX ORDER — METFORMIN HYDROCHLORIDE 500 MG/1
1000 TABLET, EXTENDED RELEASE ORAL 2 TIMES DAILY WITH MEALS
Qty: 120 TABLET | Refills: 0 | Status: SHIPPED | OUTPATIENT
Start: 2017-12-05 | End: 2018-01-29 | Stop reason: SDUPTHER

## 2017-12-05 RX ORDER — BLOOD SUGAR DIAGNOSTIC
STRIP MISCELLANEOUS
Qty: 10000 STRIP | Refills: 12 | Status: SHIPPED | OUTPATIENT
Start: 2017-12-05 | End: 2018-12-05 | Stop reason: SDUPTHER

## 2017-12-05 NOTE — TELEPHONE ENCOUNTER
New message from patient by portal requesting refill request of a 90 day supply of Accu-Chek Ida Plus test strips.  Patient tests 3 times a day.

## 2017-12-12 ENCOUNTER — PATIENT MESSAGE (OUTPATIENT)
Dept: FAMILY MEDICINE | Facility: CLINIC | Age: 67
End: 2017-12-12

## 2017-12-13 RX ORDER — LISINOPRIL 40 MG/1
40 TABLET ORAL
Qty: 90 TABLET | Refills: 90 | Status: SHIPPED | OUTPATIENT
Start: 2017-12-13 | End: 2018-03-14 | Stop reason: SDUPTHER

## 2017-12-15 ENCOUNTER — TELEPHONE (OUTPATIENT)
Dept: PAIN MEDICINE | Facility: CLINIC | Age: 67
End: 2017-12-15

## 2017-12-15 NOTE — TELEPHONE ENCOUNTER
Message left reminding patient of Pain Management appointment scheduled for Monday at 8:30 am with Dr. Byrd.  Location information also included.

## 2017-12-18 ENCOUNTER — OFFICE VISIT (OUTPATIENT)
Dept: PAIN MEDICINE | Facility: CLINIC | Age: 67
End: 2017-12-18
Payer: MEDICARE

## 2017-12-18 VITALS
HEART RATE: 63 BPM | HEIGHT: 67 IN | WEIGHT: 195 LBS | DIASTOLIC BLOOD PRESSURE: 78 MMHG | RESPIRATION RATE: 18 BRPM | SYSTOLIC BLOOD PRESSURE: 156 MMHG | OXYGEN SATURATION: 98 % | BODY MASS INDEX: 30.61 KG/M2

## 2017-12-18 DIAGNOSIS — M96.1 POSTLAMINECTOMY SYNDROME OF LUMBAR REGION: Primary | ICD-10-CM

## 2017-12-18 DIAGNOSIS — M47.816 LUMBAR SPONDYLOSIS: ICD-10-CM

## 2017-12-18 DIAGNOSIS — M51.36 DDD (DEGENERATIVE DISC DISEASE), LUMBAR: ICD-10-CM

## 2017-12-18 DIAGNOSIS — M54.16 LUMBAR RADICULOPATHY, RIGHT: ICD-10-CM

## 2017-12-18 PROCEDURE — 99213 OFFICE O/P EST LOW 20 MIN: CPT | Mod: S$GLB,,, | Performed by: PAIN MEDICINE

## 2017-12-18 PROCEDURE — 99999 PR PBB SHADOW E&M-EST. PATIENT-LVL III: CPT | Mod: PBBFAC,,, | Performed by: PAIN MEDICINE

## 2017-12-18 NOTE — PROGRESS NOTES
Subjective:     Patient ID: Driss Hernandez Jr. is a 67 y.o. male    Chief Complaint: Follow-up (procedure successful- patient feels no pain)      Referred by: No ref. provider found      HPI:    Interval History (12/18/17):  He returns today for follow up.  He reports that right L4 TFESI has been helpful for the right lumbar radicular pain. He reports 100% relief. He still has some lower back pain, but would prefer to discuss this later. Otherwise he is doing well.       Interval History (11/13/17):  He returns today for follow up.  He reports that PT has been helpful for the low back pain. He still has significant right foot and ankle pain when sitting in a reclined postion. This is the most painful area. He also has low back pain that is constant but does not bother him as much. Otherwise he is doing well.       Driss Hernandez Jr. is a 67 y.o. male who presents today with chronic bilateral low back pain R>>>L. Pain started over 40 years ago. No inciting injury or event noted. Pain is located mainly on the right side of the lower lumbar paraspinals. About 5 weeks ago, patient began to have right lwoer extremity pain that he felt was related to his back pain, but this has subsided. He denies any numbness, tingling, weakness, or b/b dysfunction. The pain is described as dull, but can become sharp at times. Patient states that his pain is worse in the morning. He does not sleep well. States that he wakes up every 45 minutes. Has taken Tizanidine PRN in the pat, but cannot recall how it affected him. Probably has not taken in over a year. Cannot take NSAIDs due to decreased renal function. Has taken in the past with mild relief.  This pain is described in detail below.    Physical Therapy: Has completed course of PT and continues HEP    Non-pharmacologic Treatment: Nothing really helps         · TENS? No    Pain Medications:         · Currently taking: Gabapentin 500mg per day, Tizandine 16mg QHS    · Has  tried in the past:  NSAIDs, Tylenol,     · Has not tried: Opioids, Tylenol, TCAs, SNRIs, topical creams    Blood thinners: ASA 325mg daily    Interventional Therapies: None    Relevant Surgeries: Previous right L4 hemilaminectomy    Affecting sleep? Yes    Affecting daily activities? yes    Depressive symptoms? no          · SI/HI? No    Work status: Unemployed    Pain Scores:    Best:       0/10  Worst:     8/10  Usually:   4/10  Today:    0/10    Review of Systems   Constitutional: Negative for activity change, appetite change, chills, fatigue, fever and unexpected weight change.   HENT: Negative for hearing loss.    Eyes: Negative for visual disturbance.   Respiratory: Negative for chest tightness and shortness of breath.    Cardiovascular: Negative for chest pain.   Gastrointestinal: Negative for abdominal pain, constipation, diarrhea, nausea and vomiting.   Genitourinary: Negative for difficulty urinating.   Musculoskeletal: Positive for back pain. Negative for gait problem and neck pain.   Skin: Negative for rash.   Neurological: Negative for dizziness, weakness, light-headedness, numbness and headaches.   Psychiatric/Behavioral: Positive for sleep disturbance. Negative for hallucinations and suicidal ideas. The patient is not nervous/anxious.        Past Medical History:   Diagnosis Date    Chronic midline low back pain without sciatica 10/2/2017    Diabetes mellitus with neurological manifestations, uncontrolled 1/24/2017    Diabetic polyneuropathy associated with type 2 diabetes mellitus 1/24/2017    Diabetic polyneuropathy associated with type 2 diabetes mellitus     Essential hypertension 1/24/2017    Gastroesophageal reflux disease 1/24/2017    Hyperlipidemia 1/24/2017    Insomnia 1/24/2017    Long-term insulin use 1/24/2017    Lumbar spondylosis 11/13/2017    Nuclear sclerosis of both eyes 8/24/2017    Obesity     Uncontrolled type 2 diabetes mellitus without complication, with long-term  "current use of insulin 1/24/2017       Past Surgical History:   Procedure Laterality Date    BACK SURGERY      2002    COLONOSCOPY N/A 2/21/2017    Procedure: COLONOSCOPY;  Surgeon: Robb Rosado MD;  Location[facility];  Service: Endoscopy;  Laterality: N/A;    CORONARY ARTERY BYPASS GRAFT      March 2016    TONSILLECTOMY         Social History     Social History    Marital status:      Spouse name: N/A    Number of children: N/A    Years of education: N/A     Occupational History    Not on file.     Social History Main Topics    Smoking status: Never Smoker    Smokeless tobacco: Never Used    Alcohol use No    Drug use: No    Sexual activity: Yes     Partners: Female     Other Topics Concern    Not on file     Social History Narrative    No narrative on file       Review of patient's allergies indicates:   Allergen Reactions    Penicillins Other (See Comments)     Unknown reaction, Had a reaction as a child    Shrimp Itching     Hand itching       Current Outpatient Prescriptions on File Prior to Visit   Medication Sig Dispense Refill    ACCU-CHEK MOIZ CONTROL SOLN Soln       ACCU-CHEK MOIZ PLUS TEST STRP Strp tid 73124 strip 12    ACCU-CHEK SOFTCLIX LANCETS Misc       aspirin 325 MG tablet Take 325 mg by mouth every morning.       atorvastatin (LIPITOR) 40 MG tablet Take 40 mg by mouth every morning.       BD ALCOHOL SWABS PadM       BD INSULIN PEN NEEDLE UF MINI 31 gauge x 3/16" Ndle       fenofibrate 160 MG Tab Take 1 tablet (160 mg total) by mouth every morning. 90 tablet 4    gabapentin (NEURONTIN) 100 MG capsule Take by mouth 2 (two) times daily. Takes two 100 mg capsules in the morning and three 100 mg capsules with dinner      insulin aspart protamine-insulin aspart (NOVOLOG 70/30) 100 unit/mL (70-30) InPn pen Inject into the skin 2 (two) times daily before meals.      LANTUS SOLOSTAR 100 unit/mL (3 mL) InPn pen Inject 13 Units into the skin every morning. 3.9 mL 2 " "   lisinopril (PRINIVIL,ZESTRIL) 40 MG tablet Take 1 tablet (40 mg total) by mouth daily with dinner or evening meal. 90 tablet 90    metFORMIN (GLUCOPHAGE-XR) 500 MG 24 hr tablet Take 2 tablets (1,000 mg total) by mouth 2 (two) times daily with meals. Takes two 500 mg twice a day 120 tablet 0    metoprolol succinate (TOPROL-XL) 25 MG 24 hr tablet Take 1 tablet (25 mg total) by mouth daily with dinner or evening meal. 90 tablet 4    nateglinide (STARLIX) 60 MG tablet Take 60 mg by mouth 3 (three) times daily before meals.      omeprazole (PRILOSEC) 20 MG capsule Take 20 mg by mouth daily with dinner or evening meal.       sildenafil (REVATIO) 20 mg Tab 1-5 pills at a time per day prn 30 tablet 11    tiZANidine (ZANAFLEX) 4 MG tablet Take 4 tablets (16 mg total) by mouth every evening. 360 tablet 0    triazolam (HALCION) 0.25 MG Tab TK 1 T PO QHS, prn  5     No current facility-administered medications on file prior to visit.        Objective:      BP (!) 156/78   Pulse 63   Resp 18   Ht 5' 7" (1.702 m)   Wt 88.5 kg (195 lb)   SpO2 98%   BMI 30.54 kg/m²     Exam:  GEN:  Well developed, well nourished.  No acute distress.   HEENT:  No trauma.  Mucous membranes moist.  Nares patent bilaterally.  PSYCH: Normal affect. Thought content appropriate.  CHEST:  Breathing symmetric.  No audible wheezing.  ABD: Soft, non-tender, non-distended.  SKIN:  Warm, pink, dry.  No rash on exposed areas.    EXT:  No cyanosis, clubbing, or edema.  No color change or changes in nail or hair growth.  NEURO/MUSCULOSKELETAL:  Fully alert, oriented, and appropriate. Speech normal ivania. No cranial nerve deficits.   Gait: normal.  No focal motor deficits.       Imaging:  Narrative     MRI lumbar spine without contrast.    Comparison: None.    Technique: Sagittal T1, T2, stir and axial T2 and T1-weighted images were obtained.  No IV contrast was utilized.    Results: The alignment of the lumbar spine is normal.  The vertebral " body heights are well-maintained.  There is mild disc desiccation at all level.  Moderate disc space narrowing at L4-L5.  No evidence of malignant bone marrow replacement process or infection.  The conus medullaris terminates in good position.    T12-L1: Mild diffuse disc bulge, no central canal or foraminal stenosis.  The facet joints appear normal.    L1-L2: Mild diffuse disc bulge, mild facet joint degenerative change, no central canal or foraminal stenosis.    L2-L3: Mild diffuse disc bulge, moderate facet joint osseous hypertrophy.  No central canal or significant foramina narrowing.    L3-L4: Mild diffuse disc bulge, moderate facet joint osseous hypertrophy.  There is no central canal stenosis.  There is no greater than mild foraminal narrowing.    L4-L5: Laminectomy defect on the right.  There is a diffuse disc bulge and what appears to be a right paracentral disc extrusion which could abut the descending and exiting nerve roots , there is good decompression of the canal posteriorly.  There is moderate to severe bilateral foraminal narrowing.    L5-S1: Diffuse disc bulge and moderate to severe facet joint osseous hypertrophy, no central canal stenosis.  There is severe bilateral foramina narrowing.    The paraspinal soft tissues appear normal.   Impression         Postoperative changes of right laminectomy at L4-L5.  There is a diffuse disc bulge with a right paracentral/foraminal disc extrusion likely to abut on the descending and right exiting nerve root.  There is moderate to severe bilateral foraminal narrowing at this level.  There is severe bilateral foraminal also seen at L5-S1 due to facet joint osseous hypertrophy.          Electronically signed by: ISAI FERNANDEZ MD  Date: 08/23/17  Time: 08:40          Assessment:       Encounter Diagnoses   Name Primary?    Postlaminectomy syndrome of lumbar region Yes    Lumbar radiculopathy, right     DDD (degenerative disc disease), lumbar     Lumbar  spondylosis          Plan:       Driss was seen today for follow-up.    Diagnoses and all orders for this visit:    Postlaminectomy syndrome of lumbar region    Lumbar radiculopathy, right    DDD (degenerative disc disease), lumbar    Lumbar spondylosis        Driss Hernandez Jr. is a 67 y.o. male with improved chronic radicular pain in the right L4 and L5 distribution. May also have facetogenic low back pain, but patient would prefer to discuss this at a future appointment    1. Continue HEP.  2. No further interventions at this time.  3. Continue Tizanidine 16mg QHS. Can call for refills when needs them. Can also be filled by PCP.  4. RTC PRN.

## 2017-12-28 ENCOUNTER — LAB VISIT (OUTPATIENT)
Dept: LAB | Facility: HOSPITAL | Age: 67
End: 2017-12-28
Attending: INTERNAL MEDICINE
Payer: MEDICARE

## 2017-12-28 DIAGNOSIS — E78.5 HYPERLIPEMIA: ICD-10-CM

## 2017-12-28 LAB
ALBUMIN SERPL BCP-MCNC: 3.6 G/DL
ALP SERPL-CCNC: 31 U/L
ALT SERPL W/O P-5'-P-CCNC: 34 U/L
ANION GAP SERPL CALC-SCNC: 7 MMOL/L
AST SERPL-CCNC: 27 U/L
BILIRUB SERPL-MCNC: 0.2 MG/DL
BUN SERPL-MCNC: 36 MG/DL
CALCIUM SERPL-MCNC: 9.6 MG/DL
CHLORIDE SERPL-SCNC: 109 MMOL/L
CO2 SERPL-SCNC: 22 MMOL/L
CREAT SERPL-MCNC: 1.5 MG/DL
EST. GFR  (AFRICAN AMERICAN): 54.5 ML/MIN/1.73 M^2
EST. GFR  (NON AFRICAN AMERICAN): 47.2 ML/MIN/1.73 M^2
ESTIMATED AVG GLUCOSE: 183 MG/DL
GLUCOSE SERPL-MCNC: 234 MG/DL
HBA1C MFR BLD HPLC: 8 %
POTASSIUM SERPL-SCNC: 5.2 MMOL/L
PROT SERPL-MCNC: 7.1 G/DL
SODIUM SERPL-SCNC: 138 MMOL/L
T4 FREE SERPL-MCNC: 0.83 NG/DL
TSH SERPL DL<=0.005 MIU/L-ACNC: 4.64 UIU/ML

## 2017-12-28 PROCEDURE — 83036 HEMOGLOBIN GLYCOSYLATED A1C: CPT

## 2017-12-28 PROCEDURE — 84439 ASSAY OF FREE THYROXINE: CPT

## 2017-12-28 PROCEDURE — 80053 COMPREHEN METABOLIC PANEL: CPT

## 2017-12-28 PROCEDURE — 36415 COLL VENOUS BLD VENIPUNCTURE: CPT | Mod: PO

## 2017-12-28 PROCEDURE — 84443 ASSAY THYROID STIM HORMONE: CPT

## 2018-01-15 ENCOUNTER — PATIENT MESSAGE (OUTPATIENT)
Dept: FAMILY MEDICINE | Facility: CLINIC | Age: 68
End: 2018-01-15

## 2018-01-15 RX ORDER — GABAPENTIN 100 MG/1
CAPSULE ORAL
Qty: 150 CAPSULE | Refills: 3 | Status: SHIPPED | OUTPATIENT
Start: 2018-01-15 | End: 2018-04-13 | Stop reason: SDUPTHER

## 2018-01-20 ENCOUNTER — OFFICE VISIT (OUTPATIENT)
Dept: FAMILY MEDICINE | Facility: CLINIC | Age: 68
End: 2018-01-20
Payer: MEDICARE

## 2018-01-20 VITALS
SYSTOLIC BLOOD PRESSURE: 126 MMHG | HEART RATE: 64 BPM | WEIGHT: 197.56 LBS | TEMPERATURE: 98 F | BODY MASS INDEX: 31.01 KG/M2 | DIASTOLIC BLOOD PRESSURE: 80 MMHG | OXYGEN SATURATION: 97 % | HEIGHT: 67 IN

## 2018-01-20 DIAGNOSIS — K04.7 TOOTH ABSCESS: Primary | ICD-10-CM

## 2018-01-20 PROCEDURE — 99999 PR PBB SHADOW E&M-EST. PATIENT-LVL V: CPT | Mod: PBBFAC,,, | Performed by: NURSE PRACTITIONER

## 2018-01-20 PROCEDURE — 99213 OFFICE O/P EST LOW 20 MIN: CPT | Mod: S$GLB,,, | Performed by: NURSE PRACTITIONER

## 2018-01-20 RX ORDER — NATEGLINIDE 120 MG/1
TABLET ORAL
COMMUNITY
Start: 2018-01-15 | End: 2018-04-12 | Stop reason: DRUGHIGH

## 2018-01-20 RX ORDER — LEVOFLOXACIN 500 MG/1
500 TABLET, FILM COATED ORAL DAILY
Qty: 10 TABLET | Refills: 0 | Status: SHIPPED | OUTPATIENT
Start: 2018-01-20 | End: 2018-01-30

## 2018-01-20 RX ORDER — PEN NEEDLE, DIABETIC 31 GX5/16"
NEEDLE, DISPOSABLE MISCELLANEOUS
COMMUNITY
Start: 2018-01-18 | End: 2024-01-18

## 2018-01-20 NOTE — PATIENT INSTRUCTIONS
Dental Abscess  An abscess is a sac of pus. A dental abscess forms when a tooth or the tissue around it becomes infected with bacteria. The bacteria can enter through a cavity or a crack in a tooth. It can also infect the gum tissue or bone around a tooth. An untreated abscess can cause the loss of the tooth. It can even spread to other parts of the body and become life-threatening.    Symptoms of a dental abscess   Signs of a dental abscess include:  · Toothache, often severe  · Tooth pain with hot, cold, or pressure  · Pain in the gums, cheek, or jaw  · Bad breath or bitter taste in the mouth  · Trouble swallowing or opening the mouth  · Fever  · Swollen or enlarged glands in the neck  Diagnosing a dental abscess  An abscess is diagnosed by looking at your teeth and gums. You will be told if any tests, like dental X-rays, are needed.  Treating a dental abscess  Treatments for a dental abscess may include the following:  · Antibiotic medicines to treat the underlying infection.  · Pain relievers to help you feel more comfortable. Your health care provider may prescribe a medicine for you. Or, use over-the-counter pain relievers, like acetaminophen or ibuprofen.  · Warm saltwater rinses to soothe discomfort and help clear away pus.  · Root canal surgery if needed to save the tooth. With a root canal, the infected part of the tooth is removed. A special substance is then used to fill the empty space in the tooth.  · Drainage of the abscess if needed. Incisions are made to allow the infected material to drain from the tooth.  · Removal of the tooth in cases of severe infection that cant be treated another way.  If the infection is severe, has spread, or doesnt respond to treatment, you may need to be admitted to a hospital.        When to call the dentist  Call your dentist right away if you have any of the following:  · Fever of 100.4°F (38°C) or higher  · Increased pain, redness, drainage, or swelling in the  treated area  · Swelling of the face or jawbone  · Pain that cannot be controlled with medicines   Preventing dental abscess  To prevent another abscess in the future, keep your teeth clean and healthy. Brush twice a day and floss at least once daily. See your dentist for regular tooth cleanings. And avoid sugary foods and drinks that can lead to tooth decay.  Date Last Reviewed: 7/14/2015  © 2511-6419 [employer]. [address] [place] [address], [place] [address]. All rights reserved. This information is not intended as a substitute for professional medical care. Always follow your healthcare professional's instructions.

## 2018-01-20 NOTE — PROGRESS NOTES
Subjective:       Patient ID: Driss Hernandez Jr. is a 67 y.o. male who presents to urgent care with complaints of pain at the TMJ for several days. Had abscess from infected tooth several weeks ago, took antibiotics, went to dentist and was told root canal would cost $1600, could not afford, some tooth upper molar that needs the root canal, no fever      Chief Complaint: Abscess    HPI  Review of Systems   Constitutional: Negative for activity change, appetite change, chills, fatigue and fever.   Respiratory: Negative for cough, chest tightness, shortness of breath and wheezing.    Cardiovascular: Negative for chest pain and leg swelling.   Gastrointestinal: Negative for abdominal distention, abdominal pain, anal bleeding, blood in stool, constipation, diarrhea, nausea, rectal pain and vomiting.   Genitourinary: Negative for difficulty urinating, enuresis, flank pain, frequency, genital sores and hematuria.   Musculoskeletal: Negative for back pain, gait problem and joint swelling.   Skin: Negative.  Negative for color change.   Neurological: Negative for dizziness, syncope and numbness.   Psychiatric/Behavioral: Negative for agitation, confusion, decreased concentration, dysphoric mood and hallucinations. The patient is not nervous/anxious and is not hyperactive.        Objective:      Physical Exam   Constitutional: He is oriented to person, place, and time. He appears well-developed and well-nourished.   Cardiovascular: Normal rate, regular rhythm and normal heart sounds.    Pulmonary/Chest: Effort normal and breath sounds normal. No respiratory distress. He has no wheezes. He has no rales.   Abdominal: Soft. He exhibits no distension and no mass. There is no tenderness. There is no guarding.   Musculoskeletal: Normal range of motion.   Neurological: He is alert and oriented to person, place, and time.   Skin: Skin is warm and dry.        Psychiatric: He has a normal mood and affect. His behavior is normal.  Judgment and thought content normal.       Assessment:        tooth abscess     Plan:       There are no diagnoses linked to this encounter.    Education  Pt has been given instructions populated from [employer] database and those entered into patient instructions field and has verbalized understanding of the after visit summary and information contained wherein.    Follow Up  If no better 3-5 days fu with pcp  Need to fu with dentist in regards to tooth infection.    In Case of Emergency   May go to ER for acute shortness of breath, lightheadedness, fever, or any other emergent complaints or changes in condition.

## 2018-01-24 ENCOUNTER — OFFICE VISIT (OUTPATIENT)
Dept: FAMILY MEDICINE | Facility: CLINIC | Age: 68
End: 2018-01-24
Payer: MEDICARE

## 2018-01-24 VITALS
DIASTOLIC BLOOD PRESSURE: 80 MMHG | WEIGHT: 193.81 LBS | OXYGEN SATURATION: 99 % | SYSTOLIC BLOOD PRESSURE: 150 MMHG | TEMPERATURE: 98 F | HEIGHT: 67 IN | HEART RATE: 72 BPM | BODY MASS INDEX: 30.42 KG/M2

## 2018-01-24 DIAGNOSIS — N52.1 DIABETES WITH CIRCULATORY DISORDER CAUSING ERECTILE DYSFUNCTION: ICD-10-CM

## 2018-01-24 DIAGNOSIS — N18.30 STAGE 3 CHRONIC KIDNEY DISEASE: Primary | ICD-10-CM

## 2018-01-24 DIAGNOSIS — M48.061 SPINAL STENOSIS OF LUMBAR REGION, UNSPECIFIED WHETHER NEUROGENIC CLAUDICATION PRESENT: ICD-10-CM

## 2018-01-24 DIAGNOSIS — M47.816 LUMBAR SPONDYLOSIS: ICD-10-CM

## 2018-01-24 DIAGNOSIS — E78.2 MIXED HYPERLIPIDEMIA: ICD-10-CM

## 2018-01-24 DIAGNOSIS — R22.1 NECK MASS: ICD-10-CM

## 2018-01-24 DIAGNOSIS — Z79.4 LONG-TERM INSULIN USE: ICD-10-CM

## 2018-01-24 DIAGNOSIS — M47.816 LUMBAR ARTHROPATHY: ICD-10-CM

## 2018-01-24 DIAGNOSIS — M51.36 DDD (DEGENERATIVE DISC DISEASE), LUMBAR: ICD-10-CM

## 2018-01-24 DIAGNOSIS — T14.90XA TRAUMA: ICD-10-CM

## 2018-01-24 DIAGNOSIS — F13.20 SEDATIVE DEPENDENCE: ICD-10-CM

## 2018-01-24 DIAGNOSIS — I10 ESSENTIAL HYPERTENSION: ICD-10-CM

## 2018-01-24 DIAGNOSIS — E11.59 DIABETES WITH CIRCULATORY DISORDER CAUSING ERECTILE DYSFUNCTION: ICD-10-CM

## 2018-01-24 DIAGNOSIS — E11.42 DIABETIC POLYNEUROPATHY ASSOCIATED WITH TYPE 2 DIABETES MELLITUS: ICD-10-CM

## 2018-01-24 DIAGNOSIS — E11.9 DM TYPE 2 WITHOUT RETINOPATHY: ICD-10-CM

## 2018-01-24 DIAGNOSIS — L98.9 SKIN LESION: ICD-10-CM

## 2018-01-24 PROCEDURE — 99999 PR PBB SHADOW E&M-EST. PATIENT-LVL IV: CPT | Mod: PBBFAC,,, | Performed by: INTERNAL MEDICINE

## 2018-01-24 PROCEDURE — 99499 UNLISTED E&M SERVICE: CPT | Mod: S$GLB,,, | Performed by: INTERNAL MEDICINE

## 2018-01-24 PROCEDURE — 99215 OFFICE O/P EST HI 40 MIN: CPT | Mod: S$GLB,,, | Performed by: INTERNAL MEDICINE

## 2018-01-24 RX ORDER — METOPROLOL SUCCINATE 50 MG/1
50 TABLET, EXTENDED RELEASE ORAL
Qty: 90 TABLET | Refills: 4 | Status: SHIPPED | OUTPATIENT
Start: 2018-01-24 | End: 2019-03-08 | Stop reason: SDUPTHER

## 2018-01-24 RX ORDER — TIZANIDINE 4 MG/1
16 TABLET ORAL NIGHTLY
Qty: 360 TABLET | Refills: 0 | Status: SHIPPED | OUTPATIENT
Start: 2018-01-24 | End: 2018-04-24

## 2018-01-24 NOTE — PROGRESS NOTES
Chief complaint: Evaluate numerous issues    67-year-old white male here with a list of issues.  He was referred to his endocrinology for some renal insufficiency and hyperkalemia.  I have labs on him only for the past year and he has had some worsening renal insufficiency and some intermittent hyperkalemia.  He does not eat fruits but has been eating some dried apricots and lots of us.  We reviewed a sheet of foods high in potassium he will make adjustments.  I explained him at length chronic renal insufficiency has a relates to age, diabetes and hypertension.  His diabetes is still uncontrolled with an A1c of 8.  He is being seen by endocrine for this.  Blood pressure at endocrinology recently was 128 but was 150 today and encouraged him to check twice a day so that we get a better idea of his blood pressure.  Given that we can assume it might be elevated I will increase his metoprolol to 50 mg and he'll watch for bradycardia.  He also has a new mass on the left side of his neck.  He did have an abscess in the left upper tooth that had his whole face swollen and spontaneously drained into his mouth.  That was 3 months ago.  Just for about 1 week he has developed a nodule or mass in the left upper neck.  He did go to urgent care and has been on some antibiotics and it is noticeably getting smaller.  We discussed the possibility of malignancy.  He still has problems with the tooth and discussed it likely still has a tooth that needs to be pulled.  He did not want to pay $1600 for a root canal.  We discussed that if the mass or lymph node persists he may need to have labs in your removal so it's probably best for him to get the tooth addressed.    Also yesterday he injured his right fourth finger.  He hit the finger and it was okay but then he pulled straight.  He has decreased range of motion of bruising.  We discussed the need for x-ray to assess for fracture so that he can wear the proper splint for the proper  amount of time.  Labs reviewed and patient counseled at great length regarding the multitude of these issuesTotal time over 45 minutes with over 50% counseling.  Next patient also has a non-healing scab lesion for 3-6 months or more on the right chest.  We discussed the probability an assumption that it is an underlying cancer and we will refer to dermatology.  His daughter is a practitioner and apparently said the same thing next    Blood pressure may be elevated lately due to significant stress.  Apparently he is a lot of legal issues with his son and his son's family.      ROS:   CONST: weight stable. EYES: no vision change. ENT: no sore throat. CV: no chest pain w/ exertion. RESP: no shortness of breath. GI: no nausea, vomiting, diarrhea. No dysphagia. : no urinary issues. MUSCULOSKELETAL: no new myalgias or arthralgias. SKIN: no new changes. NEURO: no focal deficits. PSYCH: no new issues. ENDOCRINE: no polyuria. HEME: no lymph nodes. ALLERGY: no general pruritis.    Past medical history:  1.  Uncontrolled diabetes with neuropathy, followed by Dr. Agrawal  2.  Coronary artery disease with triple bypass 2016, followed by the heart clinic. Now [facility], neg PET   3.  Back surgery .  5.  Hyperlipidemia.  6.  Hypertension.  7.  Never had colonoscopy  8.  Pneumovax and Prevnar given  according to records    Family history: Father  at 77 with stroke.  Mother  at 72 with heart problems, diabetes, hypertension.  2 sisters living in their 80s and one  at 82.  One brother  at 80 with some kidney disease and diabetes.  Sisters with diabetes, hypertension and stroke.  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  He drinks alcohol about 3 times.  Never smoked.      Vital signs as above  Gen. no distress  Skin: Small tender scab on the right anterior chest.  Musculoskeletal: He has a  "bruise over the DIP of the right fourth finger and inability to fully flex or extend.    Neck: Patient does have a mobile slightly tender mass at the angle of the left jaw or so.  Probably 1 x 1"    Driss was seen today for discuss health.    Diagnoses and all orders for this visit:    Stage 3 chronic kidney disease, explained progression, causative factors and so forth.  We'll obtain ultrasound to rule out obstruction and so forth but also refer to nephrology  -     Ambulatory referral to Nephrology  -     US Retroperitoneal Complete; Future    Essential hypertension, presumably uncontrolled, increase Toprol to 50 if possible, continue ACE inhibitor  -     metoprolol succinate (TOPROL-XL) 50 MG 24 hr tablet; Take 1 tablet (50 mg total) by mouth daily with dinner or evening meal.  -     Ambulatory referral to Nephrology    Diabetic polyneuropathy associated with type 2 diabetes mellitus, being followed by endocrine    DM type 2 without retinopathy    Diabetes mellitus with neurological manifestations, uncontrolled    Mixed hyperlipidemia, continue medications    Long-term insulin use, managed by endocrine    Lumbar arthropathy, seeing pain management    Diabetes with circulatory disorder causing erectile dysfunction    Sedative dependence    Skin lesion, new problem, probable malignancy  -     Ambulatory referral to Dermatology    Trauma, assess for fracture  -     X-Ray Finger 2 or More Views; Future    Neck mass, hopefully it's a reactive lymph noted to the ongoing dental abscess which needs to be primarily treated.  Obviously if the mass persists over 6 weeks we may need to directly address the lymph node or mass with surgery/oncology                 Based on the differential diagnosis above including malignancy and so forth access would not be appropriate at this point"This note will not be shared with the patient."    "

## 2018-01-24 NOTE — TELEPHONE ENCOUNTER
----- Message from Charnessa Hill sent at 1/24/2018  2:01 PM CST -----  Contact: self  Refill: tiZANidine (ZANAFLEX) 4 MG tablet. Send to [place] [address]  [facility] [address] [phone]    Contact pt at [phone].    Thanks-

## 2018-01-25 ENCOUNTER — HOSPITAL ENCOUNTER (OUTPATIENT)
Dept: RADIOLOGY | Facility: HOSPITAL | Age: 68
Discharge: HOME | End: 2018-01-25
Attending: INTERNAL MEDICINE
Payer: MEDICARE

## 2018-01-25 DIAGNOSIS — T14.90XA TRAUMA: ICD-10-CM

## 2018-01-25 PROCEDURE — 73140 X-RAY EXAM OF FINGER(S): CPT | Mod: 26,LT,, | Performed by: RADIOLOGY

## 2018-01-25 PROCEDURE — 73140 X-RAY EXAM OF FINGER(S): CPT | Mod: TC,PO

## 2018-01-29 ENCOUNTER — TELEPHONE (OUTPATIENT)
Dept: FAMILY MEDICINE | Facility: CLINIC | Age: 68
End: 2018-01-29

## 2018-01-29 NOTE — TELEPHONE ENCOUNTER
----- Message from Codie Diaz sent at 1/29/2018  2:21 PM CST -----  Contact: wife  Refill: metFORMIN (GLUCOPHAGE-XR) 500 MG 24 hr tablet    [facility] on [address]    Patient's wife requests to get 30 day supply. She states he has one pill left. She can be reached at [phone]. Thank you!

## 2018-01-29 NOTE — LETTER
January 29, 2018    Driss Jason BurtHernandez Jr.  [address]  [place] [address]             [facility]  [address]  [place] [address]  Phone: [phone]  Fax: [phone] Dear Mr. Hernandez:    I have been unable to reach you by phone for your appointment to Nephrology .  Please call me at the clinic [phone] to book your appointment.      If you have any questions or concerns, please don't hesitate to call.    Sincerely,        Mirtha Stevenson MA

## 2018-01-30 ENCOUNTER — HOSPITAL ENCOUNTER (OUTPATIENT)
Dept: RADIOLOGY | Facility: HOSPITAL | Age: 68
Discharge: HOME | End: 2018-01-30
Attending: INTERNAL MEDICINE
Payer: MEDICARE

## 2018-01-30 DIAGNOSIS — N18.30 STAGE 3 CHRONIC KIDNEY DISEASE: ICD-10-CM

## 2018-01-30 PROCEDURE — 76770 US EXAM ABDO BACK WALL COMP: CPT | Mod: 26,,, | Performed by: RADIOLOGY

## 2018-01-30 PROCEDURE — 76770 US EXAM ABDO BACK WALL COMP: CPT | Mod: TC

## 2018-01-30 RX ORDER — METFORMIN HYDROCHLORIDE 500 MG/1
1000 TABLET, EXTENDED RELEASE ORAL 2 TIMES DAILY WITH MEALS
Qty: 120 TABLET | Refills: 3 | Status: SHIPPED | OUTPATIENT
Start: 2018-01-30 | End: 2019-03-08 | Stop reason: SDUPTHER

## 2018-02-07 ENCOUNTER — PATIENT MESSAGE (OUTPATIENT)
Dept: FAMILY MEDICINE | Facility: CLINIC | Age: 68
End: 2018-02-07

## 2018-02-08 ENCOUNTER — TELEPHONE (OUTPATIENT)
Dept: FAMILY MEDICINE | Facility: CLINIC | Age: 68
End: 2018-02-08

## 2018-02-08 NOTE — TELEPHONE ENCOUNTER
I see that a letter has already been sent regarding the referral to nephrology.  Apparently, as of today he has not got it and is trying to set up that appointment.  Can someone please give him a call, thanks

## 2018-02-09 ENCOUNTER — CLINICAL SUPPORT (OUTPATIENT)
Dept: FAMILY MEDICINE | Facility: CLINIC | Age: 68
End: 2018-02-09
Payer: MEDICARE

## 2018-02-09 DIAGNOSIS — Z23 NEED FOR PROPHYLACTIC VACCINATION AND INOCULATION AGAINST INFLUENZA: Primary | ICD-10-CM

## 2018-02-09 PROCEDURE — 90662 IIV NO PRSV INCREASED AG IM: CPT | Mod: S$GLB,,, | Performed by: INTERNAL MEDICINE

## 2018-02-09 PROCEDURE — G0008 ADMIN INFLUENZA VIRUS VAC: HCPCS | Mod: S$GLB,,, | Performed by: INTERNAL MEDICINE

## 2018-02-09 PROCEDURE — 99499 UNLISTED E&M SERVICE: CPT | Mod: S$GLB,,, | Performed by: INTERNAL MEDICINE

## 2018-02-10 NOTE — ANESTHESIA POSTPROCEDURE EVALUATION
"Anesthesia Post Evaluation    Patient: Driss Hernandez Jr.    Procedure(s) Performed: Procedure(s) (LRB):  PHACOEMULSIFICATION-ASPIRATION-CATARACT (Right)  INSERTION-INTRAOCULAR LENS (IOL) (Right)    Final Anesthesia Type: MAC  Patient location during evaluation: [facility]  Patient participation: Yes- Able to Participate  Level of consciousness: awake and alert  Post-procedure vital signs: reviewed and stable  Pain management: adequate  Airway patency: patent  PONV status at discharge: No PONV  Anesthetic complications: no      Cardiovascular status: blood pressure returned to baseline  Respiratory status: unassisted  Hydration status: euvolemic  Follow-up not needed.        Visit Vitals  /64   Pulse 60   Temp 37 °C (98.6 °F) (Temporal)   Resp 20   Ht 5' 7" (1.702 m)   Wt 88.5 kg (195 lb)   SpO2 99%   BMI 30.54 kg/m²       Pain/Jose Maria Score: Pain Assessment Performed: Yes (8/29/2017  1:50 PM)  Presence of Pain: denies (8/29/2017  1:50 PM)  Pain Rating Prior to Med Admin: 7 (8/29/2017  1:21 PM)  Jose Maria Score: 10 (8/29/2017  1:50 PM)      " EXAM DATE/TIME:  02/10/2018 08:29 

 

[facility] COMPARISON:     

CT BRAIN W/O CONTRAST, August 01, 2017, 21:28.

 

 

INDICATIONS :     

Dizziness and general weakness today. Posterior cephalgia.

                      

 

RADIATION DOSE:     

34.87 CTDIvol (mGy) 

 

 

 

MEDICAL HISTORY :     

Seizures. Hypertension. 

 

SURGICAL HISTORY :      

Tonsillectomy. anuerysm clip

 

ENCOUNTER:      

Initial

 

ACUITY:      

1 day

 

PAIN SCALE:      

6/10

 

LOCATION:       

Bilateral occipital head

 

TECHNIQUE:     

Multiple contiguous axial images were obtained of the head.  Using automated exposure control and adj
ustment of the mA and/or kV according to patient size, radiation dose was kept as low as reasonably a
chievable to obtain optimal diagnostic quality images.   DICOM format image data is available electro
nically for review and comparison.  

 

FINDINGS:     

 

CEREBRUM:     

The ventricles are normal for age.  No evidence of midline shift, mass lesion, hemorrhage or acute in
farction. Stable post surgical changes within the left frontal lobe adjacent to the craniotomy defect
. A aneurysm clip in the left supraclinoid region. No extra-axial fluid collections are seen.

 

POSTERIOR FOSSA:     

The cerebellum and brainstem are intact.  The 4th ventricle is midline.  The cerebellopontine angle i
s unremarkable.

 

EXTRACRANIAL:     

The visualized portion of the orbits is intact.

 

SKULL:     

Stable postsurgical changes of the left frontoparietal vertex. The sinuses are clear. The orbits are 
unremarkable.

 

CONCLUSION:     

No acute disease.  

 

 

 

 Theresa Buckley MD on February 10, 2018 at 8:38           

Board Certified Radiologist.

 This report was verified electronically.

## 2018-02-20 ENCOUNTER — TELEPHONE (OUTPATIENT)
Dept: FAMILY MEDICINE | Facility: CLINIC | Age: 68
End: 2018-02-20

## 2018-02-20 NOTE — TELEPHONE ENCOUNTER
Received letter from pt dentist and needs clearance, letter in Dr. MARIA LUISA amador please advise

## 2018-02-21 NOTE — TELEPHONE ENCOUNTER
Received a letter that he is going to have a tooth extraction of #5.  Chart reviewed.  It doesn't appear to be any contraindication and there is no special needs, no antibiotic prophylaxis since there are no major heart problems or joint replacements noted

## 2018-03-13 ENCOUNTER — OFFICE VISIT (OUTPATIENT)
Dept: NEPHROLOGY | Facility: CLINIC | Age: 68
End: 2018-03-13
Payer: MEDICARE

## 2018-03-13 VITALS
DIASTOLIC BLOOD PRESSURE: 60 MMHG | WEIGHT: 196.19 LBS | OXYGEN SATURATION: 99 % | BODY MASS INDEX: 30.79 KG/M2 | SYSTOLIC BLOOD PRESSURE: 120 MMHG | HEART RATE: 68 BPM | HEIGHT: 67 IN

## 2018-03-13 DIAGNOSIS — N18.30 CONTROLLED TYPE 2 DIABETES MELLITUS WITH STAGE 3 CHRONIC KIDNEY DISEASE, WITH LONG-TERM CURRENT USE OF INSULIN: Primary | ICD-10-CM

## 2018-03-13 DIAGNOSIS — Z79.4 CONTROLLED TYPE 2 DIABETES MELLITUS WITH STAGE 3 CHRONIC KIDNEY DISEASE, WITH LONG-TERM CURRENT USE OF INSULIN: Primary | ICD-10-CM

## 2018-03-13 DIAGNOSIS — E11.22 CONTROLLED TYPE 2 DIABETES MELLITUS WITH STAGE 3 CHRONIC KIDNEY DISEASE, WITH LONG-TERM CURRENT USE OF INSULIN: Primary | ICD-10-CM

## 2018-03-13 PROCEDURE — 3078F DIAST BP <80 MM HG: CPT | Mod: CPTII,S$GLB,, | Performed by: INTERNAL MEDICINE

## 2018-03-13 PROCEDURE — 3074F SYST BP LT 130 MM HG: CPT | Mod: CPTII,S$GLB,, | Performed by: INTERNAL MEDICINE

## 2018-03-13 PROCEDURE — 99499 UNLISTED E&M SERVICE: CPT | Mod: S$GLB,,, | Performed by: INTERNAL MEDICINE

## 2018-03-13 PROCEDURE — 99999 PR PBB SHADOW E&M-EST. PATIENT-LVL II: CPT | Mod: PBBFAC,,, | Performed by: INTERNAL MEDICINE

## 2018-03-13 PROCEDURE — 3045F PR MOST RECENT HEMOGLOBIN A1C LEVEL 7.0-9.0%: CPT | Mod: CPTII,S$GLB,, | Performed by: INTERNAL MEDICINE

## 2018-03-13 PROCEDURE — 99204 OFFICE O/P NEW MOD 45 MIN: CPT | Mod: S$GLB,,, | Performed by: INTERNAL MEDICINE

## 2018-03-13 NOTE — LETTER
March 19, 2018      Jono Willoughby MD  [address] [place] [address]           [facility] [facility]  [address] [place] [address]  Phone: [phone]          Patient: Driss Hernandez Jr.   MR Number: [id number]   YOB: 1950   Date of Visit: 3/13/2018       Dear Dr. Jono Willoughby:    Thank you for referring Driss Hernandez to me for evaluation. Attached you will find relevant portions of my assessment and plan of care.    If you have questions, please do not hesitate to call me. I look forward to following Driss Hernandez along with you.    Sincerely,    Kaitlin Piña  CC:  No Recipients    If you would like to receive this communication electronically, please contact [email or web address] or [phone] to request more information on Medicine in Practice Link access.    For providers and/or their staff who would like to refer a patient to [facility], please contact us through our one-stop-shop provider referral line, [facility], at [phone].    If you feel you have received this communication in error or would no longer like to receive these types of communications, please e-mail [email or web address]

## 2018-03-14 ENCOUNTER — PATIENT MESSAGE (OUTPATIENT)
Dept: FAMILY MEDICINE | Facility: CLINIC | Age: 68
End: 2018-03-14

## 2018-03-14 RX ORDER — LISINOPRIL 40 MG/1
40 TABLET ORAL
Qty: 90 TABLET | Refills: 90 | Status: SHIPPED | OUTPATIENT
Start: 2018-03-14 | End: 2019-03-15

## 2018-03-14 RX ORDER — OMEPRAZOLE 20 MG/1
20 CAPSULE, DELAYED RELEASE ORAL
Qty: 90 CAPSULE | Refills: 90 | Status: SHIPPED | OUTPATIENT
Start: 2018-03-14 | End: 2018-03-26 | Stop reason: SDUPTHER

## 2018-03-20 NOTE — PROGRESS NOTES
Subjective:       Patient ID: Driss Hernandez Jr. is a 67 y.o. White male who presents for new evaluation of Chronic Kidney Disease    HPI Mr. Hernandez is a 67 year old man with medical history of diabetes, hypertension presenting for evaluation of chronic kidney disease. Patient creatinine 1.3mg/dL January 2017, slowly trending up to 1.5mg/dL in December 2017.  Patient reports blood sugars previously elevated, reports has been better controlled since last visit with primary in January 2018.  He reports moderate daily fluid intake, denies any NSAID use.  He otherwise denies any fever, chest pain, shortness of breath, abdominal pain, diarrhea, dysuria/hematuria.     Review of Systems   Constitutional: Negative for appetite change, fatigue and fever.   Respiratory: Negative for cough and shortness of breath.    Cardiovascular: Negative for chest pain and leg swelling.   Gastrointestinal: Negative for abdominal pain, constipation, diarrhea, nausea and vomiting.   Genitourinary: Negative for dysuria, flank pain, frequency, hematuria and urgency.   Musculoskeletal: Negative for arthralgias, back pain and joint swelling.   Skin: Negative for rash.   Neurological: Negative for dizziness and light-headedness.   All other systems reviewed and are negative.      Objective:      Physical Exam   Constitutional: He appears well-developed and well-nourished.   Cardiovascular: Normal rate and regular rhythm.  Exam reveals no gallop and no friction rub.    No murmur heard.  Pulmonary/Chest: Effort normal and breath sounds normal. No respiratory distress. He has no wheezes. He has no rales.   Musculoskeletal: He exhibits no edema.   Neurological: He is alert.   Skin: Skin is warm and dry. No rash noted.   Vitals reviewed.      Assessment:       1. Controlled type 2 diabetes mellitus with stage 3 chronic kidney disease, with long-term current use of insulin        Plan:     Mr. Hernandez is a 67 year old man with medical history  of diabetes, hypertension presenting for evaluation of chronic kidney disease.  Patient creatinine 1.3mg/dL January 2017, slowly trending up to 1.5mg/dL in December 2017.  Suspect patient with CKD stage III due to previously uncontrolled diabetes.  Encouraged fluid intake, will repeat renal panel to trend; will obtain urine studies to rule out infectious/glomerular etiology, reviewed renal US to rule out obstructive/vascular etiology.  Stressed importance of blood pressure/glycemic control, along with weight loss, to prevent any further progression of kidney disease, patient voiced understanding.   - Hyperkalemia: discussed low potassium diet, patient given handout, will trend electrolytes    Return to clinic in 4-6 months pending renal panel within a month, then with renal/heme panel, iron/TIBC/ferritin, urinalysis/culture, urine protein/creatinine ratio, PTH/VitD prior to next visit

## 2018-03-26 ENCOUNTER — PATIENT MESSAGE (OUTPATIENT)
Dept: FAMILY MEDICINE | Facility: CLINIC | Age: 68
End: 2018-03-26

## 2018-03-26 DIAGNOSIS — K21.9 GASTROESOPHAGEAL REFLUX DISEASE, ESOPHAGITIS PRESENCE NOT SPECIFIED: Primary | ICD-10-CM

## 2018-03-26 NOTE — TELEPHONE ENCOUNTER
----- Message from La Nena Hogan sent at 3/26/2018  4:38 PM CDT -----  Contact: Monse with [employer] mail order  Need clarity on the direction for omeprazole (PRILOSEC) 20 MG capsule. Monse can be reached at [phone] refer#[id number].      Thanks,

## 2018-03-26 NOTE — TELEPHONE ENCOUNTER
Spoke with representative at [facility].  Please confirm refill amount.  Refill amount set at 90 refills.

## 2018-03-27 RX ORDER — OMEPRAZOLE 20 MG/1
20 CAPSULE, DELAYED RELEASE ORAL
Qty: 90 CAPSULE | Refills: 3 | Status: SHIPPED | OUTPATIENT
Start: 2018-03-27 | End: 2019-06-03 | Stop reason: SDUPTHER

## 2018-03-28 ENCOUNTER — OFFICE VISIT (OUTPATIENT)
Dept: CARDIOLOGY | Facility: CLINIC | Age: 68
End: 2018-03-28
Payer: MEDICARE

## 2018-03-28 ENCOUNTER — LAB VISIT (OUTPATIENT)
Dept: LAB | Facility: HOSPITAL | Age: 68
End: 2018-03-28
Attending: INTERNAL MEDICINE
Payer: MEDICARE

## 2018-03-28 VITALS
RESPIRATION RATE: 16 BRPM | OXYGEN SATURATION: 92 % | WEIGHT: 196.19 LBS | HEART RATE: 60 BPM | SYSTOLIC BLOOD PRESSURE: 138 MMHG | BODY MASS INDEX: 30.73 KG/M2 | DIASTOLIC BLOOD PRESSURE: 80 MMHG

## 2018-03-28 DIAGNOSIS — N18.30 CONTROLLED TYPE 2 DIABETES MELLITUS WITH STAGE 3 CHRONIC KIDNEY DISEASE, WITH LONG-TERM CURRENT USE OF INSULIN: ICD-10-CM

## 2018-03-28 DIAGNOSIS — Z79.4 CONTROLLED TYPE 2 DIABETES MELLITUS WITH STAGE 3 CHRONIC KIDNEY DISEASE, WITH LONG-TERM CURRENT USE OF INSULIN: ICD-10-CM

## 2018-03-28 DIAGNOSIS — I25.10 CORONARY ARTERY DISEASE INVOLVING NATIVE CORONARY ARTERY OF NATIVE HEART WITHOUT ANGINA PECTORIS: Primary | ICD-10-CM

## 2018-03-28 DIAGNOSIS — I10 ESSENTIAL HYPERTENSION: ICD-10-CM

## 2018-03-28 DIAGNOSIS — E78.2 MIXED HYPERLIPIDEMIA: ICD-10-CM

## 2018-03-28 DIAGNOSIS — E11.22 CONTROLLED TYPE 2 DIABETES MELLITUS WITH STAGE 3 CHRONIC KIDNEY DISEASE, WITH LONG-TERM CURRENT USE OF INSULIN: ICD-10-CM

## 2018-03-28 DIAGNOSIS — R06.02 SOB (SHORTNESS OF BREATH): ICD-10-CM

## 2018-03-28 LAB
ALBUMIN SERPL BCP-MCNC: 3.9 G/DL
ANION GAP SERPL CALC-SCNC: 8 MMOL/L
BUN SERPL-MCNC: 25 MG/DL
CALCIUM SERPL-MCNC: 9.5 MG/DL
CHLORIDE SERPL-SCNC: 109 MMOL/L
CO2 SERPL-SCNC: 22 MMOL/L
CREAT SERPL-MCNC: 1.2 MG/DL
EST. GFR  (AFRICAN AMERICAN): >60 ML/MIN/1.73 M^2
EST. GFR  (NON AFRICAN AMERICAN): >60 ML/MIN/1.73 M^2
GLUCOSE SERPL-MCNC: 152 MG/DL
PHOSPHATE SERPL-MCNC: 2.5 MG/DL
POTASSIUM SERPL-SCNC: 5.3 MMOL/L
SODIUM SERPL-SCNC: 139 MMOL/L

## 2018-03-28 PROCEDURE — 3075F SYST BP GE 130 - 139MM HG: CPT | Mod: CPTII,S$GLB,, | Performed by: INTERNAL MEDICINE

## 2018-03-28 PROCEDURE — 99214 OFFICE O/P EST MOD 30 MIN: CPT | Mod: S$GLB,,, | Performed by: INTERNAL MEDICINE

## 2018-03-28 PROCEDURE — 93000 ELECTROCARDIOGRAM COMPLETE: CPT | Mod: S$GLB,,, | Performed by: INTERNAL MEDICINE

## 2018-03-28 PROCEDURE — 80069 RENAL FUNCTION PANEL: CPT

## 2018-03-28 PROCEDURE — 99999 PR PBB SHADOW E&M-EST. PATIENT-LVL III: CPT | Mod: PBBFAC,,, | Performed by: INTERNAL MEDICINE

## 2018-03-28 PROCEDURE — 3045F PR MOST RECENT HEMOGLOBIN A1C LEVEL 7.0-9.0%: CPT | Mod: CPTII,S$GLB,, | Performed by: INTERNAL MEDICINE

## 2018-03-28 PROCEDURE — 3079F DIAST BP 80-89 MM HG: CPT | Mod: CPTII,S$GLB,, | Performed by: INTERNAL MEDICINE

## 2018-03-28 PROCEDURE — 99499 UNLISTED E&M SERVICE: CPT | Mod: S$GLB,,, | Performed by: INTERNAL MEDICINE

## 2018-03-28 NOTE — PROGRESS NOTES
Subjective:    Patient ID:  Driss Hernandez Jr. is a 67 y.o. male who presents for evaluation of Follow-up (6 mo )      HPI   previous history:  Here for follow-up of coronary artery disease.  He's had no worsening cardiopulmonary complaints.  He's been exercising upwards of 3-4 times per week.  He mainly complains of some back and shoulder issues for which she seen pain management.  He's undergoing therapy for this.  He's not had to undergo any injections.  He denies any chest pain, shortness of breath or palpitations.  He's express no PND, orthopnea or lower edema.  He denies any dizziness, presyncope or syncope.  Otherwise able to do all his daily activities without any issues.    Today:  Here follow-up of coronary artery disease.  He denies any worsening cardiopulmonary complaints.  He's not expressing chest pain, shortness of breath or palpitations.  His main limitation stools his back and he's been taking muscle relaxers to help him sleep at night.  He denies any PND, orthopnea or lower edema.  He's not expressing dizziness, presyncope or syncope.  Says he goes to the gym 4 times a week with his wife.  Otherwise been in his usual state of health.      Review of Systems   Constitution: Negative.   HENT: Negative.    Eyes: Negative.    Cardiovascular: Negative for chest pain, dyspnea on exertion, irregular heartbeat, leg swelling, near-syncope, orthopnea, palpitations, paroxysmal nocturnal dyspnea and syncope.   Respiratory: Negative for shortness of breath.    Skin: Negative.    Musculoskeletal: Positive for back pain.   Gastrointestinal: Negative for abdominal pain, constipation, diarrhea, flatus and heartburn.   Genitourinary: Negative for dysuria.   Neurological: Negative for dizziness.   Psychiatric/Behavioral: Negative.           Objective:    Physical Exam   Constitutional: He is oriented to person, place, and time. He appears well-developed and well-nourished. No distress.   HENT:   Head:  Normocephalic and atraumatic.   Eyes: Conjunctivae and EOM are normal. Pupils are equal, round, and reactive to light.   Neck: Normal range of motion. Neck supple. No thyromegaly present.   Cardiovascular: Normal rate, regular rhythm and normal heart sounds.    No murmur heard.  Pulmonary/Chest: Effort normal and breath sounds normal. No respiratory distress. He has no wheezes. He has no rales. He exhibits no tenderness.   Abdominal: Soft. Bowel sounds are normal.   Musculoskeletal: He exhibits no edema.   Neurological: He is alert and oriented to person, place, and time.   Skin: Skin is warm and dry.   Psychiatric: He has a normal mood and affect. His behavior is normal.           PET stress: 7-17  CONCLUSIONS: NORMAL MYOCARDIAL PERFUSION PET STRESS TEST  1. The perfusion scan is free of evidence for myocardial ischemia or injury.   2. Resting wall motion is physiologic. Stress wall motion is physiologic.   3. LV function is normal at rest and stress.  (normal is >= 51%)  4. The ventricular volumes are normal at rest and stress.   5. The extracardiac distribution of radioactivity is normal.   6. There was no previous study available to compare.    Echocardiogram:  CONCLUSIONS     1 - Normal left ventricular systolic function (EF 60-65%).     2 - No wall motion abnormalities.     3 - Concentric remodeling.     4 - Impaired LV relaxation, elevated LAP (grade 2 diastolic dysfunction).     5 - Trivial mitral regurgitation.     Holter within normal limits    LDL-99   9-17    Assessment:       1. Coronary artery disease involving native coronary artery of native heart without angina pectoris    2. Essential hypertension    3. Mixed hyperlipidemia    4. Uncontrolled type 2 diabetes mellitus without complication, with long-term current use of insulin         Plan:       -Mainly reassurance in light of testing  -Known coronary artery disease with one revascularized branch vessel disease post CABG  -Continue risk factor  modification i.e. diet and exercise as tolerated  -PAD screen     Return to clinic in 6 months

## 2018-04-06 ENCOUNTER — PES CALL (OUTPATIENT)
Dept: ADMINISTRATIVE | Facility: CLINIC | Age: 68
End: 2018-04-06

## 2018-04-12 ENCOUNTER — OFFICE VISIT (OUTPATIENT)
Dept: OPHTHALMOLOGY | Facility: CLINIC | Age: 68
End: 2018-04-12
Payer: MEDICARE

## 2018-04-12 DIAGNOSIS — H25.12 NUCLEAR SCLEROSIS, LEFT: Primary | ICD-10-CM

## 2018-04-12 DIAGNOSIS — Z96.1 PSEUDOPHAKIA: ICD-10-CM

## 2018-04-12 DIAGNOSIS — I10 ESSENTIAL HYPERTENSION: ICD-10-CM

## 2018-04-12 DIAGNOSIS — E11.9 DM TYPE 2 WITHOUT RETINOPATHY: ICD-10-CM

## 2018-04-12 PROCEDURE — 92014 COMPRE OPH EXAM EST PT 1/>: CPT | Mod: S$GLB,,, | Performed by: OPHTHALMOLOGY

## 2018-04-12 PROCEDURE — 99999 PR PBB SHADOW E&M-EST. PATIENT-LVL II: CPT | Mod: PBBFAC,,, | Performed by: OPHTHALMOLOGY

## 2018-04-12 NOTE — PROGRESS NOTES
Subjective:       Patient ID: Driss Hernandez Jr. is a 67 y.o. male.    Chief Complaint: After Cataract (Pt is here for 6 months F/U S/P CE OD & DFE for DM. )    HPI     After Cataract    Additional comments: Pt is here for 6 months F/U S/P CE OD & DFE for DM.              Comments   Pt is here for 6 months F/U S/P CE OD & DFE for DM.   Last BS was 154 this morning.  Denies eye pain and f/f.   No noticeable VA changes since last visit.   No problems with glare. Few days ago eyes were itching, burning and   tearing from pulling up carpet.     Meds: AT's prn OU        Last edited by ERIKA Fernández on 4/12/2018  8:11 AM. (History)             Assessment:       1. Nuclear sclerosis, left    2. DM type 2 without retinopathy    3. Essential hypertension    4. Pseudophakia        Plan:       Cataract OS- Not visually significant.     DM-No NPDR OU.  HTN-No retinopathy OU.            Control DM & HTN.  RTC 1 yr.

## 2018-04-13 RX ORDER — GABAPENTIN 100 MG/1
CAPSULE ORAL
Qty: 150 CAPSULE | Refills: 3 | Status: SHIPPED | OUTPATIENT
Start: 2018-04-13 | End: 2018-09-10 | Stop reason: SDUPTHER

## 2018-04-18 ENCOUNTER — HOSPITAL ENCOUNTER (OUTPATIENT)
Dept: CARDIOLOGY | Facility: HOSPITAL | Age: 68
Discharge: HOME | End: 2018-04-18
Attending: INTERNAL MEDICINE
Payer: MEDICARE

## 2018-04-18 DIAGNOSIS — I25.10 CORONARY ARTERY DISEASE INVOLVING NATIVE CORONARY ARTERY OF NATIVE HEART WITHOUT ANGINA PECTORIS: ICD-10-CM

## 2018-04-18 LAB
INTERNAL CAROTID STENOSIS: NORMAL
VASCULAR ANKLE BRACHIAL INDEX (ABI) RIGHT: 1.04 (ref 0.9–1.2)

## 2018-04-18 PROCEDURE — 93924 LWR XTR VASC STDY BILAT: CPT

## 2018-04-18 PROCEDURE — 93880 EXTRACRANIAL BILAT STUDY: CPT | Mod: 26,,, | Performed by: INTERNAL MEDICINE

## 2018-04-18 PROCEDURE — 93924 LWR XTR VASC STDY BILAT: CPT | Mod: 26,,, | Performed by: INTERNAL MEDICINE

## 2018-04-18 PROCEDURE — 93880 EXTRACRANIAL BILAT STUDY: CPT

## 2018-04-26 ENCOUNTER — OFFICE VISIT (OUTPATIENT)
Dept: FAMILY MEDICINE | Facility: CLINIC | Age: 68
End: 2018-04-26
Payer: MEDICARE

## 2018-04-26 VITALS
BODY MASS INDEX: 31.04 KG/M2 | WEIGHT: 197.75 LBS | DIASTOLIC BLOOD PRESSURE: 80 MMHG | HEART RATE: 79 BPM | HEIGHT: 67 IN | SYSTOLIC BLOOD PRESSURE: 142 MMHG

## 2018-04-26 DIAGNOSIS — E11.9 DM TYPE 2 WITHOUT RETINOPATHY: ICD-10-CM

## 2018-04-26 DIAGNOSIS — Z23 NEED FOR VACCINATION: ICD-10-CM

## 2018-04-26 DIAGNOSIS — Z00.00 ENCOUNTER FOR PREVENTIVE HEALTH EXAMINATION: Primary | ICD-10-CM

## 2018-04-26 DIAGNOSIS — E66.9 OBESITY (BMI 30.0-34.9): ICD-10-CM

## 2018-04-26 DIAGNOSIS — H61.22 IMPACTED CERUMEN OF LEFT EAR: ICD-10-CM

## 2018-04-26 DIAGNOSIS — Z79.4 LONG-TERM INSULIN USE: ICD-10-CM

## 2018-04-26 DIAGNOSIS — E11.42 DIABETIC POLYNEUROPATHY ASSOCIATED WITH TYPE 2 DIABETES MELLITUS: ICD-10-CM

## 2018-04-26 DIAGNOSIS — Z11.59 NEED FOR HEPATITIS C SCREENING TEST: ICD-10-CM

## 2018-04-26 DIAGNOSIS — K21.9 GASTROESOPHAGEAL REFLUX DISEASE, ESOPHAGITIS PRESENCE NOT SPECIFIED: ICD-10-CM

## 2018-04-26 DIAGNOSIS — I10 ESSENTIAL HYPERTENSION: ICD-10-CM

## 2018-04-26 DIAGNOSIS — E78.2 MIXED HYPERLIPIDEMIA: ICD-10-CM

## 2018-04-26 PROBLEM — Z98.890 POST-OPERATIVE STATE: Status: RESOLVED | Noted: 2017-08-30 | Resolved: 2018-04-26

## 2018-04-26 PROBLEM — M62.81 MUSCLE WEAKNESS: Status: RESOLVED | Noted: 2017-10-02 | Resolved: 2018-04-26

## 2018-04-26 PROBLEM — E66.811 OBESITY (BMI 30.0-34.9): Status: ACTIVE | Noted: 2018-04-26

## 2018-04-26 PROBLEM — M62.89 MUSCLE TIGHTNESS: Status: RESOLVED | Noted: 2017-10-02 | Resolved: 2018-04-26

## 2018-04-26 PROCEDURE — 3079F DIAST BP 80-89 MM HG: CPT | Mod: CPTII,S$GLB,, | Performed by: NURSE PRACTITIONER

## 2018-04-26 PROCEDURE — 3045F PR MOST RECENT HEMOGLOBIN A1C LEVEL 7.0-9.0%: CPT | Mod: CPTII,S$GLB,, | Performed by: NURSE PRACTITIONER

## 2018-04-26 PROCEDURE — 99999 PR PBB SHADOW E&M-EST. PATIENT-LVL IV: CPT | Mod: PBBFAC,,, | Performed by: NURSE PRACTITIONER

## 2018-04-26 PROCEDURE — G0439 PPPS, SUBSEQ VISIT: HCPCS | Mod: S$GLB,,, | Performed by: NURSE PRACTITIONER

## 2018-04-26 PROCEDURE — 99499 UNLISTED E&M SERVICE: CPT | Mod: HCNC,S$GLB,, | Performed by: NURSE PRACTITIONER

## 2018-04-26 PROCEDURE — 3077F SYST BP >= 140 MM HG: CPT | Mod: CPTII,S$GLB,, | Performed by: NURSE PRACTITIONER

## 2018-04-26 NOTE — PATIENT INSTRUCTIONS
Counseling and Referral of Other Preventative  (Italic type indicates deductible and co-insurance are waived)    Patient Name: Driss Hernandez  Today's Date: 4/26/2018    Health Maintenance       Date Due Completion Date    Hepatitis C Screening 1950 ---    TETANUS VACCINE 10/22/1967 ---    Foot Exam 03/06/2018 3/6/2017 (Done)    Override on 3/6/2017: Done (completed by Endocrinology (Dr. Agrawal's office))    Lipid Panel 09/05/2018 9/5/2017    Hemoglobin A1c 09/28/2018 3/28/2018    High Dose Statin 04/12/2019 4/12/2018    Eye Exam 04/12/2019 4/12/2018    Override on 1/10/2017: Done (External eye doctor)    Colonoscopy 02/21/2027 2/21/2017        No orders of the defined types were placed in this encounter.    The following information is provided to all patients.  This information is to help you find resources for any of the problems found today that may be affecting your health:                Living healthy guide: www.[facility].[place].gov      Understanding Diabetes: www.diabetes.org      Eating healthy: www.cdc.gov/healthyweight      CDC home safety checklist: www.cdc.gov/steadi/patient.html      Agency on Aging: www.goea.[place].gov      Alcoholics anonymous (AA): www.aa.org      Physical Activity: www.nam.nih.gov/ex6wats      Tobacco use: www.quitwithusla.org

## 2018-04-26 NOTE — PROGRESS NOTES
I offered to discuss end of life issues, including information on how to make advance directives that the patient could use to name someone who would make medical decisions on their behalf if they became too ill to make themselves.    ___Patient declined  _X_Patient is interested, I provided paper work and offered to discuss.

## 2018-04-26 NOTE — PROGRESS NOTES
"Driss Hernandez presented for a  Medicare AWV and comprehensive Health Risk Assessment today. The following components were reviewed and updated:    · Medical history  · Family History  · Social history  · Allergies and Current Medications  · Health Risk Assessment  · Health Maintenance  · Care Team     ** See Completed Assessments for Annual Wellness Visit within the encounter summary.**       The following assessments were completed:  · Living Situation  · CAGE  · Depression Screening  · Timed Get Up and Go  · Whisper Test  · Cognitive Function Screening  · Nutrition Screening  · ADL Screening  · PAQ Screening    Vitals:    04/26/18 0805   BP: (!) 142/80   BP Location: Left arm   Patient Position: Sitting   BP Method: Large (Manual)   Pulse: 79   Weight: 89.7 kg (197 lb 12 oz)   Height: 5' 7" (1.702 m)     Body mass index is 30.97 kg/m².  Physical Exam   Constitutional: He is oriented to person, place, and time.   HENT:   Left ear: impacted cerumen    Cardiovascular: Normal rate, regular rhythm and normal heart sounds.    Pulmonary/Chest: Effort normal and breath sounds normal.   Musculoskeletal: Normal range of motion.   Neurological: He is alert and oriented to person, place, and time.   Skin: Skin is warm.   Psychiatric: He has a normal mood and affect. His behavior is normal. Thought content normal.   Vitals reviewed.        Diagnoses and health risks identified today and associated recommendations/orders:    1. Encounter for preventive health examination  Education provided about preventive health examinations and procedures; addressed and discussed patient's health concerns. Additionally, reviewed medical record for risk factors and documented the results during this encounter.    2. DM type 2 without retinopathy  Stable, patient evaluated and followed by external endocrinologist: Dr. Agrawal; he's also followed by Dr. Hong ([facility] department); continue as advised.     3. Diabetes " mellitus with neurological manifestations, uncontrolled  Education provided about diabetes, management of blood glucose with diet and activities, monitoring for worsening effects of diabetes.  Reviewed most recent Ha1c and informed patient of complications associated with uncontrolled diabetes.     4. Diabetic polyneuropathy associated with type 2 diabetes mellitus  Education provided about diabetes, management of blood glucose with diet and activities, monitoring for worsening effects of diabetes.  Reviewed most recent Ha1c and informed patient of complications associated with uncontrolled diabetes.     5. Long-term insulin use  Stable, patient evaluated and followed by external endocrinologist: Dr. Agrawal    6. Essential hypertension  Presently not at goal. We discussed health risks associated with this issue.  Patient aware of dietary and lifestyle modifications and medicinal interventions.     7. Mixed hyperlipidemia  Stable, asymptomatic on ASA, cholesterol managing STATIN, and antihypertensive medication; monitor    8. Gastroesophageal reflux disease, esophagitis presence not specified  Stable, managed with omeprazole. Continue as advised regarding dietary and lifestyle modifications.     9. Obesity (BMI 30.0-34.9)  Patient engaged in structured fitness activities; encouraged to continue with dietary and lifestyle modifications.     10. Impacted cerumen of left ear  Discussed with patient OTC products to assist with cerumen removal.      11. Need for hepatitis C screening test  - Hep C antigen     12. Need for vaccination  Patient aware of need for updated tetanus vaccine.     Reviewed health maintenance with patient, educated about recommended examinations, procedures (labs & images), and immunizations.     Provided Driss with a 5-10 year written screening schedule and personal prevention plan. Recommendations were developed using the USPSTF age appropriate recommendations. Education, counseling, and  referrals were provided as needed. After Visit Summary printed and given to patient which includes a list of additional screenings\tests needed.    Follow-up in about 1 year (around 4/26/2019) for assessment .    Alan Salazar Jr, NP

## 2018-06-19 ENCOUNTER — TELEPHONE (OUTPATIENT)
Dept: NEPHROLOGY | Facility: CLINIC | Age: 68
End: 2018-06-19

## 2018-06-19 DIAGNOSIS — E87.5 HYPERKALEMIA: Primary | ICD-10-CM

## 2018-06-21 DIAGNOSIS — E11.9 DIABETES MELLITUS WITHOUT COMPLICATION: ICD-10-CM

## 2018-06-22 RX ORDER — INSULIN GLARGINE 100 [IU]/ML
13 INJECTION, SOLUTION SUBCUTANEOUS EVERY MORNING
Qty: 15 ML | Refills: 2 | Status: SHIPPED | OUTPATIENT
Start: 2018-06-22 | End: 2020-09-09

## 2018-07-24 ENCOUNTER — LAB VISIT (OUTPATIENT)
Dept: LAB | Facility: HOSPITAL | Age: 68
End: 2018-07-24
Attending: INTERNAL MEDICINE
Payer: MEDICARE

## 2018-07-24 ENCOUNTER — OFFICE VISIT (OUTPATIENT)
Dept: FAMILY MEDICINE | Facility: CLINIC | Age: 68
End: 2018-07-24
Payer: MEDICARE

## 2018-07-24 VITALS
WEIGHT: 194.69 LBS | BODY MASS INDEX: 30.56 KG/M2 | HEIGHT: 67 IN | OXYGEN SATURATION: 98 % | TEMPERATURE: 99 F | DIASTOLIC BLOOD PRESSURE: 70 MMHG | HEART RATE: 72 BPM | SYSTOLIC BLOOD PRESSURE: 132 MMHG

## 2018-07-24 DIAGNOSIS — M47.816 LUMBAR ARTHROPATHY: Primary | ICD-10-CM

## 2018-07-24 DIAGNOSIS — Z79.4 LONG-TERM INSULIN USE: ICD-10-CM

## 2018-07-24 DIAGNOSIS — E87.5 HYPERKALEMIA: ICD-10-CM

## 2018-07-24 DIAGNOSIS — M79.641 RIGHT HAND PAIN: ICD-10-CM

## 2018-07-24 DIAGNOSIS — M54.16 LUMBAR RADICULOPATHY, RIGHT: ICD-10-CM

## 2018-07-24 LAB
ALBUMIN SERPL BCP-MCNC: 4.1 G/DL
ANION GAP SERPL CALC-SCNC: 9 MMOL/L
BUN SERPL-MCNC: 44 MG/DL
CALCIUM SERPL-MCNC: 9.8 MG/DL
CHLORIDE SERPL-SCNC: 110 MMOL/L
CO2 SERPL-SCNC: 20 MMOL/L
CREAT SERPL-MCNC: 1.5 MG/DL
EST. GFR  (AFRICAN AMERICAN): 54.5 ML/MIN/1.73 M^2
EST. GFR  (NON AFRICAN AMERICAN): 47.2 ML/MIN/1.73 M^2
GLUCOSE SERPL-MCNC: 115 MG/DL
PHOSPHATE SERPL-MCNC: 2.9 MG/DL
POTASSIUM SERPL-SCNC: 5.4 MMOL/L
SODIUM SERPL-SCNC: 139 MMOL/L

## 2018-07-24 PROCEDURE — 3045F PR MOST RECENT HEMOGLOBIN A1C LEVEL 7.0-9.0%: CPT | Mod: CPTII,S$GLB,, | Performed by: INTERNAL MEDICINE

## 2018-07-24 PROCEDURE — 3078F DIAST BP <80 MM HG: CPT | Mod: CPTII,S$GLB,, | Performed by: INTERNAL MEDICINE

## 2018-07-24 PROCEDURE — 99215 OFFICE O/P EST HI 40 MIN: CPT | Mod: S$GLB,,, | Performed by: INTERNAL MEDICINE

## 2018-07-24 PROCEDURE — 36415 COLL VENOUS BLD VENIPUNCTURE: CPT | Mod: PO

## 2018-07-24 PROCEDURE — 99999 PR PBB SHADOW E&M-EST. PATIENT-LVL III: CPT | Mod: PBBFAC,,, | Performed by: INTERNAL MEDICINE

## 2018-07-24 PROCEDURE — 80069 RENAL FUNCTION PANEL: CPT

## 2018-07-24 PROCEDURE — 3075F SYST BP GE 130 - 139MM HG: CPT | Mod: CPTII,S$GLB,, | Performed by: INTERNAL MEDICINE

## 2018-07-24 RX ORDER — NATEGLINIDE 120 MG/1
TABLET ORAL
COMMUNITY
Start: 2018-07-10 | End: 2019-07-31

## 2018-07-24 NOTE — PROGRESS NOTES
Chief complaint: Right leg pain and had pain    67-year-old white male with a history of right-sided sciatica.  It went away with an epidural.  Only at night he gets a pain when he sits at his recliner for about an hour.  If the pain from the knee down the anterior leg over the anterior foot.  Sometimes he gets pretty bad.  Relieved when he gets up easily taking gabapentin 300 at night which suppresses it somewhat we discussed he can definitely increase that dose.  He also occasionally gets a sharp pain for a few seconds like an ice pick in the right hand.  Is not a particular spot.  Comes and goes very rapidly.  Sometimes worse after uses and hands a lot.  He has had carpal tunnel surgery on that side about 12 years ago and that was a different pain.  On exam today he has a completely normal had except for age-related arthritis but no pain and we therefore discussed that this could be neuropathic pain as well from an intermittent pinched nerve in the cervical spine at baby to be workup if it gets worse.    He also wishes to discuss his diabetes which is followed by endocrine for.  We discussed that sometimes after he eats certain things carbohydrates his sugar goes up and therefore he probably needs to adjust his sliding scale.  I told him that was appropriate if he knows how certain foods aggravate him.  He does continue to follow-up with outside endocrine he can discuss further with them.  We discussed the financial constraints regarding available diabetes medicines and he was counseled in great length today.Total time over 45 minutes with over 50% counseling.      ROS:   CONST: weight stable.  No hypoglycemia, no other new myalgias arthralgias or neurological symptoms except as above, no perceived weakness next      Past medical history:  1.  Uncontrolled diabetes with neuropathy, followed by Dr. Agrawal  2.  Coronary artery disease with triple bypass March 2016, followed by the heart clinic. Now [facility], neg  PET   3.  Back surgery .  5.  Hyperlipidemia.  6.  Hypertension.  7.  Never had colonoscopy  8.  Pneumovax and Prevnar given  according to records  9.  Right leg radiculopathy, confirmed by MRI, did respond epidural with [facility] pain management    Family history: Father  at 77 with stroke.  Mother  at 72 with heart problems, diabetes, hypertension.  2 sisters living in their 80s and one  at 82.  One brother  at 80 with some kidney disease and diabetes.  Sisters with diabetes, hypertension and stroke.  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  He drinks alcohol about 3 times.  Never smoked.      Vital signs as above  Musculoskeletal: Right lower extremity has no defects, full range of motion of the knees and ankles without pain, no edema, skin no rashes, warm to touch.  The right hand has the age-related expected arthritis but nothing severe.  I can move all the joints of his hands and fingers and wrists without any reproducible pain or crepitus.  No atrophy of the muscles.    Driss was seen today for back pain.    Diagnoses and all orders for this visit:    Lumbar arthropathy, he does have lower back pain and we discussed that he has severe facet disease for which payment management did discuss eventual treatment.  He currently does have the return of his right leg radiculopathy for which a trial of another epidural might be warranted or at least possibly a nerve root injection.  He'll call pain management system referral is still active since his appointment was 6 months ago.  He can increase gabapentin for neuropathic pain    Lumbar radiculopathy, right    Right hand pain, exam is normal so I suspect this could be neuropathic.  The acute nature could indicate cervical radiculopathy but consider the return of some carpal tunnel syndrome as well    Uncontrolled type 2 diabetes  "mellitus with diabetic neuropathy, with long-term current use of insulin, discussed regarding insulin dosing      Diabetes mellitus with neurological manifestations, uncontrolled    Long-term insulin use                  Clinical note be sensitive based upon his anxiety referable to these issues.  I reassured him to use the computer system whenever he has acute symptoms requiring an appointment is apparently his hand was bothering him, he called and did not quite get an acute appointment but then he himself had cancel going out of town."This note will not be shared with the patient."    "

## 2018-08-14 ENCOUNTER — LAB VISIT (OUTPATIENT)
Dept: LAB | Facility: HOSPITAL | Age: 68
End: 2018-08-14
Attending: INTERNAL MEDICINE
Payer: MEDICARE

## 2018-08-14 DIAGNOSIS — E78.5 HYPERLIPEMIA: ICD-10-CM

## 2018-08-14 LAB
ALBUMIN SERPL BCP-MCNC: 3.7 G/DL
ALP SERPL-CCNC: 33 U/L
ALT SERPL W/O P-5'-P-CCNC: 26 U/L
ANION GAP SERPL CALC-SCNC: 7 MMOL/L
AST SERPL-CCNC: 21 U/L
BILIRUB SERPL-MCNC: 0.2 MG/DL
BUN SERPL-MCNC: 37 MG/DL
CALCIUM SERPL-MCNC: 9.5 MG/DL
CHLORIDE SERPL-SCNC: 110 MMOL/L
CO2 SERPL-SCNC: 20 MMOL/L
CREAT SERPL-MCNC: 1.3 MG/DL
EST. GFR  (AFRICAN AMERICAN): >60 ML/MIN/1.73 M^2
EST. GFR  (NON AFRICAN AMERICAN): 56.1 ML/MIN/1.73 M^2
ESTIMATED AVG GLUCOSE: 189 MG/DL
GLUCOSE SERPL-MCNC: 192 MG/DL
HBA1C MFR BLD HPLC: 8.2 %
POTASSIUM SERPL-SCNC: 6 MMOL/L
PROT SERPL-MCNC: 6.9 G/DL
SODIUM SERPL-SCNC: 137 MMOL/L
T4 FREE SERPL-MCNC: 0.83 NG/DL
TSH SERPL DL<=0.005 MIU/L-ACNC: 4.85 UIU/ML

## 2018-08-14 PROCEDURE — 36415 COLL VENOUS BLD VENIPUNCTURE: CPT | Mod: PO

## 2018-08-14 PROCEDURE — 80053 COMPREHEN METABOLIC PANEL: CPT

## 2018-08-14 PROCEDURE — 83036 HEMOGLOBIN GLYCOSYLATED A1C: CPT

## 2018-08-14 PROCEDURE — 84439 ASSAY OF FREE THYROXINE: CPT

## 2018-08-14 PROCEDURE — 84443 ASSAY THYROID STIM HORMONE: CPT

## 2018-08-21 ENCOUNTER — PATIENT MESSAGE (OUTPATIENT)
Dept: FAMILY MEDICINE | Facility: CLINIC | Age: 68
End: 2018-08-21

## 2018-08-24 RX ORDER — ATORVASTATIN CALCIUM 40 MG/1
40 TABLET, FILM COATED ORAL EVERY MORNING
Qty: 90 TABLET | Refills: 3 | Status: SHIPPED | OUTPATIENT
Start: 2018-08-24 | End: 2019-07-15 | Stop reason: SDUPTHER

## 2018-09-10 RX ORDER — GABAPENTIN 100 MG/1
CAPSULE ORAL
Qty: 150 CAPSULE | Refills: 3 | Status: SHIPPED | OUTPATIENT
Start: 2018-09-10 | End: 2018-12-05 | Stop reason: SDUPTHER

## 2018-09-13 ENCOUNTER — TELEPHONE (OUTPATIENT)
Dept: NEPHROLOGY | Facility: CLINIC | Age: 68
End: 2018-09-13

## 2018-09-13 DIAGNOSIS — N18.30 CHRONIC KIDNEY DISEASE, STAGE III (MODERATE): Primary | ICD-10-CM

## 2018-09-19 ENCOUNTER — LAB VISIT (OUTPATIENT)
Dept: LAB | Facility: HOSPITAL | Age: 68
End: 2018-09-19
Attending: INTERNAL MEDICINE
Payer: MEDICARE

## 2018-09-19 DIAGNOSIS — N18.30 CHRONIC KIDNEY DISEASE, STAGE III (MODERATE): ICD-10-CM

## 2018-09-19 LAB
ALBUMIN SERPL BCP-MCNC: 4 G/DL
ANION GAP SERPL CALC-SCNC: 7 MMOL/L
BUN SERPL-MCNC: 28 MG/DL
CALCIUM SERPL-MCNC: 9.9 MG/DL
CHLORIDE SERPL-SCNC: 110 MMOL/L
CO2 SERPL-SCNC: 22 MMOL/L
CREAT SERPL-MCNC: 1.3 MG/DL
EST. GFR  (AFRICAN AMERICAN): >60 ML/MIN/1.73 M^2
EST. GFR  (NON AFRICAN AMERICAN): 56.1 ML/MIN/1.73 M^2
GLUCOSE SERPL-MCNC: 97 MG/DL
PHOSPHATE SERPL-MCNC: 3 MG/DL
POTASSIUM SERPL-SCNC: 4.9 MMOL/L
SODIUM SERPL-SCNC: 139 MMOL/L

## 2018-09-19 PROCEDURE — 36415 COLL VENOUS BLD VENIPUNCTURE: CPT | Mod: PO

## 2018-09-19 PROCEDURE — 80069 RENAL FUNCTION PANEL: CPT

## 2018-10-17 ENCOUNTER — OFFICE VISIT (OUTPATIENT)
Dept: PAIN MEDICINE | Facility: CLINIC | Age: 68
End: 2018-10-17
Payer: MEDICARE

## 2018-10-17 ENCOUNTER — TELEPHONE (OUTPATIENT)
Dept: CARDIOLOGY | Facility: CLINIC | Age: 68
End: 2018-10-17

## 2018-10-17 ENCOUNTER — TELEPHONE (OUTPATIENT)
Dept: PAIN MEDICINE | Facility: CLINIC | Age: 68
End: 2018-10-17

## 2018-10-17 VITALS
RESPIRATION RATE: 18 BRPM | HEIGHT: 67 IN | HEART RATE: 66 BPM | OXYGEN SATURATION: 99 % | BODY MASS INDEX: 30.24 KG/M2 | SYSTOLIC BLOOD PRESSURE: 175 MMHG | WEIGHT: 192.63 LBS | DIASTOLIC BLOOD PRESSURE: 83 MMHG

## 2018-10-17 DIAGNOSIS — M51.36 DDD (DEGENERATIVE DISC DISEASE), LUMBAR: Primary | ICD-10-CM

## 2018-10-17 DIAGNOSIS — M79.18 MYOFASCIAL PAIN: ICD-10-CM

## 2018-10-17 DIAGNOSIS — M47.816 LUMBAR SPONDYLOSIS: ICD-10-CM

## 2018-10-17 PROCEDURE — 99999 PR PBB SHADOW E&M-EST. PATIENT-LVL III: CPT | Mod: PBBFAC,,, | Performed by: PAIN MEDICINE

## 2018-10-17 PROCEDURE — 3079F DIAST BP 80-89 MM HG: CPT | Mod: CPTII,,, | Performed by: PAIN MEDICINE

## 2018-10-17 PROCEDURE — 99214 OFFICE O/P EST MOD 30 MIN: CPT | Mod: S$PBB,,, | Performed by: PAIN MEDICINE

## 2018-10-17 PROCEDURE — 99213 OFFICE O/P EST LOW 20 MIN: CPT | Mod: PBBFAC,PN | Performed by: PAIN MEDICINE

## 2018-10-17 PROCEDURE — 1101F PT FALLS ASSESS-DOCD LE1/YR: CPT | Mod: CPTII,,, | Performed by: PAIN MEDICINE

## 2018-10-17 PROCEDURE — 3077F SYST BP >= 140 MM HG: CPT | Mod: CPTII,,, | Performed by: PAIN MEDICINE

## 2018-10-17 RX ORDER — TIZANIDINE 4 MG/1
16 TABLET ORAL NIGHTLY
Qty: 360 TABLET | Refills: 3 | Status: SHIPPED | OUTPATIENT
Start: 2018-10-17 | End: 2021-02-01 | Stop reason: SDUPTHER

## 2018-10-17 NOTE — PROGRESS NOTES
Subjective:     Patient ID: Driss Hernandez Jr. is a 67 y.o. male    Chief Complaint: Low-back Pain (patient experiences continued lower back pain- patient experiences pulling sensation and sharp pain-pain having issues standing due to increased pain)      Referred by: No ref. provider found      HPI:    Interval History (10/16/18):  He returns today for follow up.  He reports that he has continued to have bilateral low back pain. He still has some right lower extremity pain, but this is not as bothersome as his low back pain. The pain is located across the lower lumbar region R>L. It does not radiate. It is not associated with any numbness, tingling, weakness or b/b dysfunction. The pain is worse with activity. He also requests refills of tizanidine.      Interval History (12/18/17):  He returns today for follow up.  He reports that right L4 TFESI has been helpful for the right lumbar radicular pain. He reports 100% relief. He still has some lower back pain, but would prefer to discuss this later. Otherwise he is doing well.       Interval History (11/13/17):  He returns today for follow up.  He reports that PT has been helpful for the low back pain. He still has significant right foot and ankle pain when sitting in a reclined postion. This is the most painful area. He also has low back pain that is constant but does not bother him as much. Otherwise he is doing well.       Driss Hernandez Jr. is a 67 y.o. male who presents today with chronic bilateral low back pain R>>>L. Pain started over 40 years ago. No inciting injury or event noted. Pain is located mainly on the right side of the lower lumbar paraspinals. About 5 weeks ago, patient began to have right lwoer extremity pain that he felt was related to his back pain, but this has subsided. He denies any numbness, tingling, weakness, or b/b dysfunction. The pain is described as dull, but can become sharp at times. Patient states that his pain is worse in  the morning. He does not sleep well. States that he wakes up every 45 minutes. Has taken Tizanidine PRN in the pat, but cannot recall how it affected him. Probably has not taken in over a year. Cannot take NSAIDs due to decreased renal function. Has taken in the past with mild relief.  This pain is described in detail below.    Physical Therapy: Has completed course of PT and continues HEP    Non-pharmacologic Treatment: Nothing really helps         · TENS? No    Pain Medications:         · Currently taking: Gabapentin 500mg per day, Tizandine 16mg QHS    · Has tried in the past:  NSAIDs, Tylenol,     · Has not tried: Opioids, Tylenol, TCAs, SNRIs, topical creams    Blood thinners: ASA 325mg daily    Interventional Therapies: None    Relevant Surgeries: Previous right L4 hemilaminectomy    Affecting sleep? Yes    Affecting daily activities? yes    Depressive symptoms? no          · SI/HI? No    Work status: Unemployed    Pain Scores:    Best:       0/10  Worst:     10/10  Usually:   6/10  Today:    7/10    Review of Systems   Constitutional: Negative for activity change, appetite change, chills, fatigue, fever and unexpected weight change.   HENT: Negative for hearing loss.    Eyes: Negative for visual disturbance.   Respiratory: Negative for chest tightness and shortness of breath.    Cardiovascular: Negative for chest pain.   Gastrointestinal: Negative for abdominal pain, constipation, diarrhea, nausea and vomiting.   Genitourinary: Negative for difficulty urinating.   Musculoskeletal: Positive for back pain. Negative for gait problem and neck pain.   Skin: Negative for rash.   Neurological: Negative for dizziness, weakness, light-headedness, numbness and headaches.   Psychiatric/Behavioral: Positive for sleep disturbance. Negative for hallucinations and suicidal ideas. The patient is not nervous/anxious.        Past Medical History:   Diagnosis Date    Chronic midline low back pain without sciatica 10/2/2017     Diabetes mellitus with neurological manifestations, uncontrolled 1/24/2017    Diabetic polyneuropathy associated with type 2 diabetes mellitus 1/24/2017    Diabetic polyneuropathy associated with type 2 diabetes mellitus     Essential hypertension 1/24/2017    Gastroesophageal reflux disease 1/24/2017    Hyperlipidemia 1/24/2017    Insomnia 1/24/2017    Long-term insulin use 1/24/2017    Lumbar spondylosis 11/13/2017    Nuclear sclerosis of both eyes 8/24/2017    Obesity     Uncontrolled type 2 diabetes mellitus without complication, with long-term current use of insulin 1/24/2017       Past Surgical History:   Procedure Laterality Date    BACK SURGERY      2002    CATARACT EXTRACTION W/  INTRAOCULAR LENS IMPLANT Right 08/29/2017    Dr. Hong    COLONOSCOPY N/A 2/21/2017    Procedure: COLONOSCOPY;  Surgeon: Robb Rosado MD;  Location: [facility] ENDO;  Service: Endoscopy;  Laterality: N/A;    COLONOSCOPY N/A 2/21/2017    Performed by Robb Rosado MD at [facility] ENDO    CORONARY ARTERY BYPASS GRAFT      March 2016    INJECTION-STEROID-EPIDURAL-TRANSFORAMINAL Right 11/29/2017    Performed by Eze Byrd Jr., MD at [facility] OR    INSERTION-INTRAOCULAR LENS (IOL) Right 8/29/2017    [facility] 1ST FLR    PHACOEMULSIFICATION-ASPIRATION-CATARACT Right 8/29/2017    [facility] at [facility] OR 1ST FLR    TONSILLECTOMY         Social History     Socioeconomic History    Marital status:      Spouse name: Not on file    Number of children: Not on file    Years of education: Not on file    Highest education level: Not on file   Social Needs    Financial resource strain: Not on file    Food insecurity - worry: Not on file    Food insecurity - inability: Not on file    Transportation needs - medical: Not on file    Transportation needs - non-medical: Not on file   Occupational History    Not on file   Tobacco Use    Smoking  "status: Never Smoker    Smokeless tobacco: Never Used   Substance and Sexual Activity    Alcohol use: No    Drug use: No    Sexual activity: Yes     Partners: Female   Other Topics Concern    Not on file   Social History Narrative    Not on file       Review of patient's allergies indicates:   Allergen Reactions    Penicillins Other (See Comments)     Unknown reaction, Had a reaction as a child    Shrimp Itching     Hand itching       Current Outpatient Medications on File Prior to Visit   Medication Sig Dispense Refill    ACCU-CHEK MOIZ CONTROL SOLN Soln       ACCU-CHEK MOIZ PLUS TEST STRP Strp tid 42126 strip 12    ACCU-CHEK SOFTCLIX LANCETS Misc       aspirin 325 MG tablet Take 325 mg by mouth every morning.       atorvastatin (LIPITOR) 40 MG tablet Take 1 tablet (40 mg total) by mouth every morning. 90 tablet 3    BD ALCOHOL SWABS PadM       BD INSULIN PEN NEEDLE UF MINI 31 gauge x 3/16" Ndle       BD ULTRA-FINE ROLAND PEN NEEDLES 32 gauge x 5/32" Ndle       fenofibrate 160 MG Tab Take 1 tablet (160 mg total) by mouth every morning. 90 tablet 4    gabapentin (NEURONTIN) 100 MG capsule Takes two 100 mg capsules in the morning and three 100 mg capsules with dinner 150 capsule 3    lisinopril (PRINIVIL,ZESTRIL) 40 MG tablet Take 1 tablet (40 mg total) by mouth daily with dinner or evening meal. 90 tablet 90    metFORMIN (GLUCOPHAGE-XR) 500 MG 24 hr tablet Take 2 tablets (1,000 mg total) by mouth 2 (two) times daily with meals. Takes two 500 mg twice a day 120 tablet 3    metoprolol succinate (TOPROL-XL) 50 MG 24 hr tablet Take 1 tablet (50 mg total) by mouth daily with dinner or evening meal. 90 tablet 4    nateglinide (STARLIX) 120 MG tablet       omeprazole (PRILOSEC) 20 MG capsule Take 1 capsule (20 mg total) by mouth daily with dinner or evening meal. 90 capsule 3    sildenafil (REVATIO) 20 mg Tab 1-5 pills at a time per day prn 30 tablet 11    triazolam (HALCION) 0.25 MG Tab TK 1 T PO " "QHS, prn  5    LANTUS SOLOSTAR U-100 INSULIN 100 unit/mL (3 mL) InPn pen INJECT 13 UNITS INTO THE SKIN EVERY MORNING. 15 mL 2     No current facility-administered medications on file prior to visit.        Objective:      BP (!) 175/83   Pulse 66   Resp 18   Ht 5' 7" (1.702 m)   Wt 87.4 kg (192 lb 9.6 oz)   SpO2 99%   BMI 30.17 kg/m²     Exam:  GEN:  Well developed, well nourished.  No acute distress. Normal pain behavior.  HEENT:  No trauma.  Mucous membranes moist.  Nares patent bilaterally.  PSYCH: Normal affect. Thought content appropriate.  CHEST:  Breathing symmetric.  No audible wheezing.  ABD: Soft, non-distended.  SKIN:  Warm, pink, dry.  No rash on exposed areas.    EXT:  No cyanosis, clubbing, or edema.  No color change or changes in nail or hair growth.  NEURO/MUSCULOSKELETAL:  Fully alert, oriented, and appropriate. Speech normal ivania. No cranial nerve deficits.   Gait: Normal.  No trendelenburg sign bilaterally.   Motor Strength: 5/5 motor strength throughout lower extremities.   Sensory: No sensory deficit in the lower extremities.   Reflexes:  2+ and symmetric throughout.  Downgoing Babinski's bilaterally.  No clonus or spasticity.  L-Spine:  Full ROM with pain on extension. Positive facet loading bilaterally.  Negative SLR bilaterally.    SI Joint/Hip: Negative BENJAMIN bilaterally.  Negative FADIR bilaterally.  TTP over lumbar paraspinals.          Imaging:  Narrative     MRI lumbar spine without contrast.    Comparison: None.    Technique: Sagittal T1, T2, stir and axial T2 and T1-weighted images were obtained.  No IV contrast was utilized.    Results: The alignment of the lumbar spine is normal.  The vertebral body heights are well-maintained.  There is mild disc desiccation at all level.  Moderate disc space narrowing at L4-L5.  No evidence of malignant bone marrow replacement process or infection.  The conus medullaris terminates in good position.    T12-L1: Mild diffuse disc bulge, no " central canal or foraminal stenosis.  The facet joints appear normal.    L1-L2: Mild diffuse disc bulge, mild facet joint degenerative change, no central canal or foraminal stenosis.    L2-L3: Mild diffuse disc bulge, moderate facet joint osseous hypertrophy.  No central canal or significant foramina narrowing.    L3-L4: Mild diffuse disc bulge, moderate facet joint osseous hypertrophy.  There is no central canal stenosis.  There is no greater than mild foraminal narrowing.    L4-L5: Laminectomy defect on the right.  There is a diffuse disc bulge and what appears to be a right paracentral disc extrusion which could abut the descending and exiting nerve roots , there is good decompression of the canal posteriorly.  There is moderate to severe bilateral foraminal narrowing.    L5-S1: Diffuse disc bulge and moderate to severe facet joint osseous hypertrophy, no central canal stenosis.  There is severe bilateral foramina narrowing.    The paraspinal soft tissues appear normal.   Impression         Postoperative changes of right laminectomy at L4-L5.  There is a diffuse disc bulge with a right paracentral/foraminal disc extrusion likely to abut on the descending and right exiting nerve root.  There is moderate to severe bilateral foraminal narrowing at this level.  There is severe bilateral foraminal also seen at L5-S1 due to facet joint osseous hypertrophy.          Electronically signed by: ISAI FERNANDEZ MD  Date: 08/23/17  Time: 08:40          Assessment:       Encounter Diagnoses   Name Primary?    DDD (degenerative disc disease), lumbar Yes    Lumbar spondylosis     Myofascial pain          Plan:       Driss was seen today for low-back pain.    Diagnoses and all orders for this visit:    DDD (degenerative disc disease), lumbar  -     tiZANidine (ZANAFLEX) 4 MG tablet; Take 4 tablets (16 mg total) by mouth every evening.    Lumbar spondylosis  -     tiZANidine (ZANAFLEX) 4 MG tablet; Take 4 tablets (16 mg  total) by mouth every evening.  -     Case request GI: Lumbar Medial Branch Blocks    Myofascial pain  -     tiZANidine (ZANAFLEX) 4 MG tablet; Take 4 tablets (16 mg total) by mouth every evening.        Driss Burttima Fermin. is a 67 y.o. male with improved chronic radicular pain in the right L4 and L5 distribution. Today c/o low back pain R>L. Appears to facet mediated.     1. Schedule for bilateral L2-5 MBBs. If diagnostic can proceed with right then left RFA 2 weeks apart.  2. RTC 2 weeks after procdures.  3. Tizanidine refilled today.

## 2018-10-17 NOTE — PROGRESS NOTES
Mr. Hernandez will be scheduled for bilateral L2, L3, L4, L5 MBB on 10/25/2018.  Reviewed the pre-procedure instructions listed below with him- copy also provided.  Instructed patient to notify clinic if he begins feeling ill, runs a fever, is prescribed antibiotics, and/or has any outpatient procedures within the two weeks leading up to this procedure.  Instructed patient to report to [facility], 2nd Floor Endoscopy Registration Desk.  All questions answered- patient verbalized understanding.  Request to hold ASA x 7 days will be sent to Dr. Hernandez via [email or web address].    Day of Procedure  - Ensure you have obtained an arrival time from the Pain Management Staff  o Procedure Area will call 1-3 days in advance with your arrival time.  Please check any voicemails received.  o If you arrive past your scheduled procedure time, you may be asked to reschedule your procedure.  - Ensure you have a  with you to remain present throughout your procedure for your safety.  o If you arrive without a responsible adult to stay with you and drive you home, you may be asked to reschedule your procedure.  - Take all of your prescribed medications (exceptions noted below) with a small amount of water  - This is NOT a fasting procedure, you may have a light meal before coming.  - Wear comfortable clothing (loose fitting pants).  - You may wear glasses, dentures, contact lenses, and/or hearing aids. Please remove all jewelry and metal hairpins.  - Notify the nurse during the intake process if you are allergic to any medications, if you are diabetic, or if you are not feeling well  - Contact the Pain Management Clinic with the following:  o A fever greater than 100° (degrees)   o Feel ill, have any type of infection, or are taking antibiotics now or within the two (2) weeks leading up to this procedure  o Have any outpatient procedures within the two (2) weeks leading up to this procedure (colonoscopy, major dental work,  etc.)    IF you are taking blood thinners: Only upon receiving clearance and notification from the Pain Management Department  7 Days Prior to Your Procedure:  - Stop taking Plavix/Clopridogrel, Effient/Prasugel, ASA  5 Days Prior to Your Procedure:  - Stop taking Coumadin/Warfarin.  An INR may be drawn prior to your procedure.  If labs are required, you will need to arrive earlier than your scheduled arrival time.  - Stop taking Pradaxa/Dabigatra,  Brilinta/ Ticagrelor  3 Days Prior to Your Procedure:  - Stop taking Xarelto/Rivaroxaban, Eliquis/Apixaban, Aggrenox/Dipyridamole, Reopro/Abciximab

## 2018-10-17 NOTE — H&P (VIEW-ONLY)
Subjective:     Patient ID: Driss Hernandez Jr. is a 67 y.o. male    Chief Complaint: Low-back Pain (patient experiences continued lower back pain- patient experiences pulling sensation and sharp pain-pain having issues standing due to increased pain)      Referred by: No ref. provider found      HPI:    Interval History (10/16/18):  He returns today for follow up.  He reports that he has continued to have bilateral low back pain. He still has some right lower extremity pain, but this is not as bothersome as his low back pain. The pain is located across the lower lumbar region R>L. It does not radiate. It is not associated with any numbness, tingling, weakness or b/b dysfunction. The pain is worse with activity. He also requests refills of tizanidine.      Interval History (12/18/17):  He returns today for follow up.  He reports that right L4 TFESI has been helpful for the right lumbar radicular pain. He reports 100% relief. He still has some lower back pain, but would prefer to discuss this later. Otherwise he is doing well.       Interval History (11/13/17):  He returns today for follow up.  He reports that PT has been helpful for the low back pain. He still has significant right foot and ankle pain when sitting in a reclined postion. This is the most painful area. He also has low back pain that is constant but does not bother him as much. Otherwise he is doing well.       Driss Hernandez Jr. is a 67 y.o. male who presents today with chronic bilateral low back pain R>>>L. Pain started over 40 years ago. No inciting injury or event noted. Pain is located mainly on the right side of the lower lumbar paraspinals. About 5 weeks ago, patient began to have right lwoer extremity pain that he felt was related to his back pain, but this has subsided. He denies any numbness, tingling, weakness, or b/b dysfunction. The pain is described as dull, but can become sharp at times. Patient states that his pain is worse in  the morning. He does not sleep well. States that he wakes up every 45 minutes. Has taken Tizanidine PRN in the pat, but cannot recall how it affected him. Probably has not taken in over a year. Cannot take NSAIDs due to decreased renal function. Has taken in the past with mild relief.  This pain is described in detail below.    Physical Therapy: Has completed course of PT and continues HEP    Non-pharmacologic Treatment: Nothing really helps         · TENS? No    Pain Medications:         · Currently taking: Gabapentin 500mg per day, Tizandine 16mg QHS    · Has tried in the past:  NSAIDs, Tylenol,     · Has not tried: Opioids, Tylenol, TCAs, SNRIs, topical creams    Blood thinners: ASA 325mg daily    Interventional Therapies: None    Relevant Surgeries: Previous right L4 hemilaminectomy    Affecting sleep? Yes    Affecting daily activities? yes    Depressive symptoms? no          · SI/HI? No    Work status: Unemployed    Pain Scores:    Best:       0/10  Worst:     10/10  Usually:   6/10  Today:    7/10    Review of Systems   Constitutional: Negative for activity change, appetite change, chills, fatigue, fever and unexpected weight change.   HENT: Negative for hearing loss.    Eyes: Negative for visual disturbance.   Respiratory: Negative for chest tightness and shortness of breath.    Cardiovascular: Negative for chest pain.   Gastrointestinal: Negative for abdominal pain, constipation, diarrhea, nausea and vomiting.   Genitourinary: Negative for difficulty urinating.   Musculoskeletal: Positive for back pain. Negative for gait problem and neck pain.   Skin: Negative for rash.   Neurological: Negative for dizziness, weakness, light-headedness, numbness and headaches.   Psychiatric/Behavioral: Positive for sleep disturbance. Negative for hallucinations and suicidal ideas. The patient is not nervous/anxious.        Past Medical History:   Diagnosis Date    Chronic midline low back pain without sciatica 10/2/2017     Diabetes mellitus with neurological manifestations, uncontrolled 1/24/2017    Diabetic polyneuropathy associated with type 2 diabetes mellitus 1/24/2017    Diabetic polyneuropathy associated with type 2 diabetes mellitus     Essential hypertension 1/24/2017    Gastroesophageal reflux disease 1/24/2017    Hyperlipidemia 1/24/2017    Insomnia 1/24/2017    Long-term insulin use 1/24/2017    Lumbar spondylosis 11/13/2017    Nuclear sclerosis of both eyes 8/24/2017    Obesity     Uncontrolled type 2 diabetes mellitus without complication, with long-term current use of insulin 1/24/2017       Past Surgical History:   Procedure Laterality Date    BACK SURGERY      2002    CATARACT EXTRACTION W/  INTRAOCULAR LENS IMPLANT Right 08/29/2017    Dr. Hong    COLONOSCOPY N/A 2/21/2017    Procedure: COLONOSCOPY;  Surgeon: Robb Rosado MD;  Location: [facility] ENDO;  Service: Endoscopy;  Laterality: N/A;    COLONOSCOPY N/A 2/21/2017    Performed by [facility]b Rosado MD at [facility]    CORONARY ARTERY BYPASS GRAFT      March 2016    INJECTION-STEROID-EPIDURAL-TRANSFORAMINAL Right 11/29/2017    Performed by Eze Byrd Jr., MD at [facility] OR    INSERTION-INTRAOCULAR LENS (IOL) Right 8/29/2017    [facility]ST [facility]    PHACOEMULSIFICATION-ASPIRATION-CATARACT Right 8/29/2017    [facility]    TONSILLECTOMY         Social History     Socioeconomic History    Marital status:      Spouse name: Not on file    Number of children: Not on file    Years of education: Not on file    Highest education level: Not on file   Social Needs    Financial resource strain: Not on file    Food insecurity - worry: Not on file    Food insecurity - inability: Not on file    Transportation needs - medical: Not on file    Transportation needs - non-medical: Not on file   Occupational History    Not on file   Tobacco Use    Smoking  "status: Never Smoker    Smokeless tobacco: Never Used   Substance and Sexual Activity    Alcohol use: No    Drug use: No    Sexual activity: Yes     Partners: Female   Other Topics Concern    Not on file   Social History Narrative    Not on file       Review of patient's allergies indicates:   Allergen Reactions    Penicillins Other (See Comments)     Unknown reaction, Had a reaction as a child    Shrimp Itching     Hand itching       Current Outpatient Medications on File Prior to Visit   Medication Sig Dispense Refill    ACCU-CHEK MOIZ CONTROL SOLN Soln       ACCU-CHEK MOIZ PLUS TEST STRP Strp tid 40992 strip 12    ACCU-CHEK SOFTCLIX LANCETS Misc       aspirin 325 MG tablet Take 325 mg by mouth every morning.       atorvastatin (LIPITOR) 40 MG tablet Take 1 tablet (40 mg total) by mouth every morning. 90 tablet 3    BD ALCOHOL SWABS PadM       BD INSULIN PEN NEEDLE UF MINI 31 gauge x 3/16" Ndle       BD ULTRA-FINE ROLAND PEN NEEDLES 32 gauge x 5/32" Ndle       fenofibrate 160 MG Tab Take 1 tablet (160 mg total) by mouth every morning. 90 tablet 4    gabapentin (NEURONTIN) 100 MG capsule Takes two 100 mg capsules in the morning and three 100 mg capsules with dinner 150 capsule 3    lisinopril (PRINIVIL,ZESTRIL) 40 MG tablet Take 1 tablet (40 mg total) by mouth daily with dinner or evening meal. 90 tablet 90    metFORMIN (GLUCOPHAGE-XR) 500 MG 24 hr tablet Take 2 tablets (1,000 mg total) by mouth 2 (two) times daily with meals. Takes two 500 mg twice a day 120 tablet 3    metoprolol succinate (TOPROL-XL) 50 MG 24 hr tablet Take 1 tablet (50 mg total) by mouth daily with dinner or evening meal. 90 tablet 4    nateglinide (STARLIX) 120 MG tablet       omeprazole (PRILOSEC) 20 MG capsule Take 1 capsule (20 mg total) by mouth daily with dinner or evening meal. 90 capsule 3    sildenafil (REVATIO) 20 mg Tab 1-5 pills at a time per day prn 30 tablet 11    triazolam (HALCION) 0.25 MG Tab TK 1 T PO " "QHS, prn  5    LANTUS SOLOSTAR U-100 INSULIN 100 unit/mL (3 mL) InPn pen INJECT 13 UNITS INTO THE SKIN EVERY MORNING. 15 mL 2     No current facility-administered medications on file prior to visit.        Objective:      BP (!) 175/83   Pulse 66   Resp 18   Ht 5' 7" (1.702 m)   Wt 87.4 kg (192 lb 9.6 oz)   SpO2 99%   BMI 30.17 kg/m²     Exam:  GEN:  Well developed, well nourished.  No acute distress. Normal pain behavior.  HEENT:  No trauma.  Mucous membranes moist.  Nares patent bilaterally.  PSYCH: Normal affect. Thought content appropriate.  CHEST:  Breathing symmetric.  No audible wheezing.  ABD: Soft, non-distended.  SKIN:  Warm, pink, dry.  No rash on exposed areas.    EXT:  No cyanosis, clubbing, or edema.  No color change or changes in nail or hair growth.  NEURO/MUSCULOSKELETAL:  Fully alert, oriented, and appropriate. Speech normal ivania. No cranial nerve deficits.   Gait: Normal.  No trendelenburg sign bilaterally.   Motor Strength: 5/5 motor strength throughout lower extremities.   Sensory: No sensory deficit in the lower extremities.   Reflexes:  2+ and symmetric throughout.  Downgoing Babinski's bilaterally.  No clonus or spasticity.  L-Spine:  Full ROM with pain on extension. Positive facet loading bilaterally.  Negative SLR bilaterally.    SI Joint/Hip: Negative BENJAMIN bilaterally.  Negative FADIR bilaterally.  TTP over lumbar paraspinals.          Imaging:  Narrative     MRI lumbar spine without contrast.    Comparison: None.    Technique: Sagittal T1, T2, stir and axial T2 and T1-weighted images were obtained.  No IV contrast was utilized.    Results: The alignment of the lumbar spine is normal.  The vertebral body heights are well-maintained.  There is mild disc desiccation at all level.  Moderate disc space narrowing at L4-L5.  No evidence of malignant bone marrow replacement process or infection.  The conus medullaris terminates in good position.    T12-L1: Mild diffuse disc bulge, no " central canal or foraminal stenosis.  The facet joints appear normal.    L1-L2: Mild diffuse disc bulge, mild facet joint degenerative change, no central canal or foraminal stenosis.    L2-L3: Mild diffuse disc bulge, moderate facet joint osseous hypertrophy.  No central canal or significant foramina narrowing.    L3-L4: Mild diffuse disc bulge, moderate facet joint osseous hypertrophy.  There is no central canal stenosis.  There is no greater than mild foraminal narrowing.    L4-L5: Laminectomy defect on the right.  There is a diffuse disc bulge and what appears to be a right paracentral disc extrusion which could abut the descending and exiting nerve roots , there is good decompression of the canal posteriorly.  There is moderate to severe bilateral foraminal narrowing.    L5-S1: Diffuse disc bulge and moderate to severe facet joint osseous hypertrophy, no central canal stenosis.  There is severe bilateral foramina narrowing.    The paraspinal soft tissues appear normal.   Impression         Postoperative changes of right laminectomy at L4-L5.  There is a diffuse disc bulge with a right paracentral/foraminal disc extrusion likely to abut on the descending and right exiting nerve root.  There is moderate to severe bilateral foraminal narrowing at this level.  There is severe bilateral foraminal also seen at L5-S1 due to facet joint osseous hypertrophy.          Electronically signed by: ISAI FERNANDEZ MD  Date: 08/23/17  Time: 08:40          Assessment:       Encounter Diagnoses   Name Primary?    DDD (degenerative disc disease), lumbar Yes    Lumbar spondylosis     Myofascial pain          Plan:       Driss was seen today for low-back pain.    Diagnoses and all orders for this visit:    DDD (degenerative disc disease), lumbar  -     tiZANidine (ZANAFLEX) 4 MG tablet; Take 4 tablets (16 mg total) by mouth every evening.    Lumbar spondylosis  -     tiZANidine (ZANAFLEX) 4 MG tablet; Take 4 tablets (16 mg  total) by mouth every evening.  -     Case request GI: Lumbar Medial Branch Blocks    Myofascial pain  -     tiZANidine (ZANAFLEX) 4 MG tablet; Take 4 tablets (16 mg total) by mouth every evening.        Driss Burttima Fermin. is a 67 y.o. male with improved chronic radicular pain in the right L4 and L5 distribution. Today c/o low back pain R>L. Appears to facet mediated.     1. Schedule for bilateral L2-5 MBBs. If diagnostic can proceed with right then left RFA 2 weeks apart.  2. RTC 2 weeks after procdures.  3. Tizanidine refilled today.

## 2018-10-17 NOTE — TELEPHONE ENCOUNTER
appt scheduled, spoke to pt  Done  ef    ----- Message from Kelvin Hernandez MD sent at 10/17/2018  8:24 AM CDT -----  Regarding: RE: Anticoagulant Clearance Request  He is due for follow-up.  I will see him prior to the procedure but likely should be able to if not overly symptomatic and still has good functional exercise tolerance.      --David Shoemaker,     Can you please add on to clinic on Friday?    ----- Message -----  From: Tonia Archer RN  Sent: 10/17/2018   7:52 AM  To: Kelvin Hernandez MD  Subject: Anticoagulant Clearance Request                  Dr. Hernandez,    Mr. Hernandez is scheduled to have Bilateral L2, L3, L4, L5 MBB on 10/25/2018.  Dr. Byrd is requesting that the patient stop taking ASA seven days prior to this procedure.  If the blocks are effective, the patient will then be scheduled for Left & Right Radiofrequency Ablations of the Nerves.  The medication would need to be held for those procedures as well.  Please indicate whether or not he is able to stop taking the medication listed above.  Feel free to contact the clinic with any questions or concerns you may have.      Thank you in advance for your time and expertise,    CINDY Yu, RN

## 2018-10-17 NOTE — TELEPHONE ENCOUNTER
Message left informing Mr. Hernandez that his procedure will be move to 10/31/18 due to cardiology appointment on 10/19/18- clearance needed to hold ASA x 7 days.  Phone number included for any questions.

## 2018-10-19 ENCOUNTER — OFFICE VISIT (OUTPATIENT)
Dept: CARDIOLOGY | Facility: CLINIC | Age: 68
End: 2018-10-19
Payer: MEDICARE

## 2018-10-19 VITALS
SYSTOLIC BLOOD PRESSURE: 124 MMHG | BODY MASS INDEX: 30.04 KG/M2 | RESPIRATION RATE: 20 BRPM | OXYGEN SATURATION: 92 % | WEIGHT: 191.81 LBS | DIASTOLIC BLOOD PRESSURE: 86 MMHG | HEART RATE: 70 BPM

## 2018-10-19 DIAGNOSIS — I25.10 CORONARY ARTERY DISEASE INVOLVING NATIVE CORONARY ARTERY OF NATIVE HEART WITHOUT ANGINA PECTORIS: Primary | ICD-10-CM

## 2018-10-19 DIAGNOSIS — E11.9 TYPE 2 DIABETES MELLITUS WITHOUT COMPLICATION: ICD-10-CM

## 2018-10-19 DIAGNOSIS — I10 ESSENTIAL HYPERTENSION: ICD-10-CM

## 2018-10-19 DIAGNOSIS — E78.2 MIXED HYPERLIPIDEMIA: ICD-10-CM

## 2018-10-19 DIAGNOSIS — R06.02 SOB (SHORTNESS OF BREATH): ICD-10-CM

## 2018-10-19 DIAGNOSIS — I25.10 CAD (CORONARY ARTERY DISEASE): ICD-10-CM

## 2018-10-19 PROCEDURE — 93005 ELECTROCARDIOGRAM TRACING: CPT | Mod: PBBFAC | Performed by: INTERNAL MEDICINE

## 2018-10-19 PROCEDURE — 99999 PR PBB SHADOW E&M-EST. PATIENT-LVL III: CPT | Mod: PBBFAC,,, | Performed by: INTERNAL MEDICINE

## 2018-10-19 PROCEDURE — 99214 OFFICE O/P EST MOD 30 MIN: CPT | Mod: S$PBB,,, | Performed by: INTERNAL MEDICINE

## 2018-10-19 PROCEDURE — 3074F SYST BP LT 130 MM HG: CPT | Mod: CPTII,,, | Performed by: INTERNAL MEDICINE

## 2018-10-19 PROCEDURE — 93010 ELECTROCARDIOGRAM REPORT: CPT | Mod: S$PBB,,, | Performed by: INTERNAL MEDICINE

## 2018-10-19 PROCEDURE — 99213 OFFICE O/P EST LOW 20 MIN: CPT | Mod: PBBFAC,25 | Performed by: INTERNAL MEDICINE

## 2018-10-19 PROCEDURE — 1101F PT FALLS ASSESS-DOCD LE1/YR: CPT | Mod: CPTII,,, | Performed by: INTERNAL MEDICINE

## 2018-10-19 PROCEDURE — 3045F PR MOST RECENT HEMOGLOBIN A1C LEVEL 7.0-9.0%: CPT | Mod: CPTII,,, | Performed by: INTERNAL MEDICINE

## 2018-10-19 PROCEDURE — 3079F DIAST BP 80-89 MM HG: CPT | Mod: CPTII,,, | Performed by: INTERNAL MEDICINE

## 2018-10-19 NOTE — PROGRESS NOTES
Subjective:    Patient ID:  Driss Hernandez Jr. is a 67 y.o. male who presents for evaluation of No chief complaint on file.      HPI   previous history:  Here follow-up of coronary artery disease.  He denies any worsening cardiopulmonary complaints.  He's not expressing chest pain, shortness of breath or palpitations.  His main limitation stools his back and he's been taking muscle relaxers to help him sleep at night.  He denies any PND, orthopnea or lower edema.  He's not expressing dizziness, presyncope or syncope.  Says he goes to the gym 4 times a week with his wife.  Otherwise been in his usual state of health.    Today:  Here for follow-up of coronary artery disease.  He is here for follow-up as well to obtain preop clearance for back procedure including nerve block and possible sympathectomy by pain management.  He denies any cardiopulmonary complaints.  He has experienced no chest pain, shortness of breath or palpitations.  He denies any PND, orthopnea or lower extremity edema. He has not experienced any dizziness, presyncope or syncope.        Review of Systems   Constitution: Negative.   HENT: Negative.    Eyes: Negative.    Cardiovascular: Negative for chest pain, dyspnea on exertion, irregular heartbeat, leg swelling, near-syncope, orthopnea, palpitations, paroxysmal nocturnal dyspnea and syncope.   Respiratory: Negative for shortness of breath.    Skin: Negative.    Musculoskeletal: Positive for back pain.   Gastrointestinal: Negative for abdominal pain, constipation, diarrhea, flatus and heartburn.   Genitourinary: Negative for dysuria.   Neurological: Negative for dizziness.   Psychiatric/Behavioral: Negative.           Objective:    Physical Exam   Constitutional: He is oriented to person, place, and time. He appears well-developed and well-nourished. No distress.   HENT:   Head: Normocephalic and atraumatic.   Eyes: Conjunctivae and EOM are normal. Pupils are equal, round, and reactive to  light.   Neck: Normal range of motion. Neck supple. No thyromegaly present.   Cardiovascular: Normal rate, regular rhythm and normal heart sounds.   No murmur heard.  Pulmonary/Chest: Effort normal and breath sounds normal. No respiratory distress. He has no wheezes. He has no rales. He exhibits no tenderness.   Abdominal: Soft. Bowel sounds are normal.   Musculoskeletal: He exhibits no edema.   Neurological: He is alert and oriented to person, place, and time.   Skin: Skin is warm and dry.   Psychiatric: He has a normal mood and affect. His behavior is normal.       EKG today showed normal sinus rhythm with nonspecific changes in comparison to the previous no change    PET stress: 7-17  CONCLUSIONS: NORMAL MYOCARDIAL PERFUSION PET STRESS TEST  1. The perfusion scan is free of evidence for myocardial ischemia or injury.   2. Resting wall motion is physiologic. Stress wall motion is physiologic.   3. LV function is normal at rest and stress.  (normal is >= 51%)  4. The ventricular volumes are normal at rest and stress.   5. The extracardiac distribution of radioactivity is normal.   6. There was no previous study available to compare.    Echocardiogram:  CONCLUSIONS     1 - Normal left ventricular systolic function (EF 60-65%).     2 - No wall motion abnormalities.     3 - Concentric remodeling.     4 - Impaired LV relaxation, elevated LAP (grade 2 diastolic dysfunction).     5 - Trivial mitral regurgitation.     Holter within normal limits    LDL-99   9-17    Assessment:       1. Coronary artery disease involving native coronary artery of native heart without angina pectoris    2. Essential hypertension    3. Mixed hyperlipidemia    4. Uncontrolled type 2 diabetes mellitus without complication, with long-term current use of insulin    5. SOB (shortness of breath)         Plan:       -Mainly reassurance in light of testing  -Known coronary artery disease with one revascularized branch vessel disease post CABG  * okay  to hold aspirin 5 days prior to the procedure and then resume postop when okay from their standpoint  -cleared at low to intermediate risk for nerve block and sympathectomy  -Continue risk factor modification i.e. diet and exercise as tolerated  -PAD screen   -repeat lipid profile pending per PCP    Return to clinic in 6 months

## 2018-10-19 NOTE — LETTER
October 19, 2018    Driss Hernandez Jr.  [address]  [place] [address]             [facility]  [address]  [facility] [address]  Phone: [phone]   Driss Hernandez Jr. was seen by me on 10/19/2018 and is cleared for procedure with low risk.  Ok to stop Asprin 5 to 7 days prior as needed.     If you have any questions or concerns, please don't hesitate to call.    Sincerely,      Kelvin Hernandez MD

## 2018-10-24 ENCOUNTER — OFFICE VISIT (OUTPATIENT)
Dept: NEPHROLOGY | Facility: CLINIC | Age: 68
End: 2018-10-24
Payer: MEDICARE

## 2018-10-24 VITALS
BODY MASS INDEX: 30.52 KG/M2 | HEART RATE: 76 BPM | OXYGEN SATURATION: 96 % | DIASTOLIC BLOOD PRESSURE: 70 MMHG | WEIGHT: 194.44 LBS | SYSTOLIC BLOOD PRESSURE: 120 MMHG | HEIGHT: 67 IN

## 2018-10-24 DIAGNOSIS — Z79.4 CONTROLLED TYPE 2 DIABETES MELLITUS WITH STAGE 3 CHRONIC KIDNEY DISEASE, WITH LONG-TERM CURRENT USE OF INSULIN: Primary | ICD-10-CM

## 2018-10-24 DIAGNOSIS — E87.5 HYPERKALEMIA: ICD-10-CM

## 2018-10-24 DIAGNOSIS — N18.30 CONTROLLED TYPE 2 DIABETES MELLITUS WITH STAGE 3 CHRONIC KIDNEY DISEASE, WITH LONG-TERM CURRENT USE OF INSULIN: Primary | ICD-10-CM

## 2018-10-24 DIAGNOSIS — E11.22 CONTROLLED TYPE 2 DIABETES MELLITUS WITH STAGE 3 CHRONIC KIDNEY DISEASE, WITH LONG-TERM CURRENT USE OF INSULIN: Primary | ICD-10-CM

## 2018-10-24 PROCEDURE — 3045F PR MOST RECENT HEMOGLOBIN A1C LEVEL 7.0-9.0%: CPT | Mod: CPTII,HCNC,, | Performed by: INTERNAL MEDICINE

## 2018-10-24 PROCEDURE — 3074F SYST BP LT 130 MM HG: CPT | Mod: CPTII,HCNC,, | Performed by: INTERNAL MEDICINE

## 2018-10-24 PROCEDURE — 99999 PR PBB SHADOW E&M-EST. PATIENT-LVL III: CPT | Mod: PBBFAC,HCNC,, | Performed by: INTERNAL MEDICINE

## 2018-10-24 PROCEDURE — 3078F DIAST BP <80 MM HG: CPT | Mod: CPTII,HCNC,, | Performed by: INTERNAL MEDICINE

## 2018-10-24 PROCEDURE — 1101F PT FALLS ASSESS-DOCD LE1/YR: CPT | Mod: CPTII,HCNC,, | Performed by: INTERNAL MEDICINE

## 2018-10-24 PROCEDURE — 99214 OFFICE O/P EST MOD 30 MIN: CPT | Mod: S$PBB,HCNC,, | Performed by: INTERNAL MEDICINE

## 2018-10-24 PROCEDURE — 99213 OFFICE O/P EST LOW 20 MIN: CPT | Mod: PBBFAC,HCNC,PO | Performed by: INTERNAL MEDICINE

## 2018-10-24 NOTE — PROGRESS NOTES
Subjective:       Patient ID: Driss Hernandez Jr. is a 68 y.o. White male who presents for follow up of Chronic Kidney Disease    HPI   Mr. Hernandez is a 68 year old man with medical history of diabetes, hypertension presenting for follow up of chronic kidney disease.  Patient creatinine 1.3mg/dL January 2017, slowly trending up to 1.5mg/dL in December 2017.  Patient reports blood sugars previously elevated, reports has been better controlled since last visit with primary in January 2018.  He reports more adequate daily fluid intake, denies any NSAID use.  He otherwise denies any fever, chest pain, shortness of breath, abdominal pain, diarrhea, dysuria/hematuria.     Review of Systems   Constitutional: Negative for appetite change, fatigue and fever.   Respiratory: Negative for cough and shortness of breath.    Cardiovascular: Negative for chest pain and leg swelling.   Gastrointestinal: Negative for abdominal pain, constipation, diarrhea, nausea and vomiting.   Genitourinary: Negative for dysuria, flank pain, frequency, hematuria and urgency.   Musculoskeletal: Negative for arthralgias, back pain and joint swelling.   Skin: Negative for rash.   Neurological: Negative for dizziness and light-headedness.   All other systems reviewed and are negative.      Objective:      Physical Exam   Constitutional: He appears well-developed and well-nourished.   Cardiovascular: Normal rate and regular rhythm. Exam reveals no gallop and no friction rub.   No murmur heard.  Pulmonary/Chest: Effort normal and breath sounds normal. No respiratory distress. He has no wheezes. He has no rales.   Musculoskeletal: He exhibits no edema.   Neurological: He is alert.   Skin: Skin is warm and dry. No rash noted.   Vitals reviewed.      Assessment:       1. Controlled type 2 diabetes mellitus with stage 3 chronic kidney disease, with long-term current use of insulin    2. Hyperkalemia        Plan:     Mr. Hernandez is a 68 year old man  with medical history of diabetes, hypertension presenting for evaluation of chronic kidney disease.  Patient creatinine 1.3mg/dL January 2017, slowly trending up to 1.5mg/dL in December 2017.  Suspect patient with CKD stage IIIa due to previously uncontrolled diabetes.  Encouraged fluid intake, improved since last visit, will repeat renal panel to trend; will obtain urine studies to rule out infectious/glomerular etiology, reviewed renal US to rule out obstructive/vascular etiology.  Stressed importance of blood pressure/glycemic control, along with weight loss, to prevent any further progression of kidney disease, patient voiced understanding.   - Hyperkalemia: improved, again discussed low potassium diet, patient given handout, will trend electrolytes    Return to clinic in 4-6 months with renal/heme panel, iron/TIBC/ferritin, urinalysis/culture, urine protein/creatinine ratio, PTH/VitD prior to next visit

## 2018-10-25 ENCOUNTER — TELEPHONE (OUTPATIENT)
Dept: PAIN MEDICINE | Facility: CLINIC | Age: 68
End: 2018-10-25

## 2018-10-25 NOTE — TELEPHONE ENCOUNTER
Informed Mr. Naqvi that we received clearance from Dr. Hernandez that he is able to hold ASA for 5 days prior to Pain Management procedure scheduled on 10/31/2018.  Confirmed that last dose will be today.  Patient verbalized understanding.

## 2018-10-29 ENCOUNTER — HOSPITAL ENCOUNTER (OUTPATIENT)
Facility: HOSPITAL | Age: 68
Discharge: HOME | End: 2018-10-31
Attending: EMERGENCY MEDICINE | Admitting: EMERGENCY MEDICINE
Payer: MEDICARE

## 2018-10-29 DIAGNOSIS — R10.13 EPIGASTRIC PAIN: ICD-10-CM

## 2018-10-29 DIAGNOSIS — K56.609 BOWEL OBSTRUCTION: ICD-10-CM

## 2018-10-29 DIAGNOSIS — K56.600 PARTIAL SMALL BOWEL OBSTRUCTION: Primary | ICD-10-CM

## 2018-10-29 LAB
ALBUMIN SERPL-MCNC: 3.8 G/DL (ref 3.3–5.5)
ALBUMIN SERPL-MCNC: 3.9 G/DL (ref 3.3–5.5)
ALP SERPL-CCNC: 28 U/L (ref 53–128)
ALP SERPL-CCNC: 31 U/L (ref 53–128)
BILIRUB SERPL-MCNC: 0.7 MG/DL (ref 0.2–1.6)
BILIRUB SERPL-MCNC: 0.8 MG/DL (ref 0.2–1.6)
BILIRUBIN, POC UA: NEGATIVE
BLOOD, POC UA: NEGATIVE
BUN SERPL-MCNC: 24 MG/DL (ref 7–22)
CALCIUM SERPL-MCNC: 9.6 MG/DL (ref 8–10.3)
CHLORIDE SERPL-SCNC: 106 MMOL/L (ref 98–108)
CLARITY, POC UA: CLEAR
COLOR, POC UA: YELLOW
CREAT SERPL-MCNC: 1 MG/DL (ref 0.6–1.2)
GLUCOSE SERPL-MCNC: 84 MG/DL (ref 73–118)
GLUCOSE, POC UA: NEGATIVE
KETONES, POC UA: NEGATIVE
LEUKOCYTE EST, POC UA: NEGATIVE
NITRITE, POC UA: NEGATIVE
PH UR STRIP: 5.5 [PH]
POC ALT (SGPT): 60 U/L (ref 10–47)
POC ALT (SGPT): 63 U/L (ref 10–47)
POC AMYLASE: 50 U/L (ref 14–97)
POC AST (SGOT): 92 U/L (ref 11–38)
POC AST (SGOT): 96 U/L (ref 11–38)
POC B-TYPE NATRIURETIC PEPTIDE: 46.5 PG/ML (ref 0–100)
POC CARDIAC TROPONIN I: 0.03 NG/ML
POC CARDIAC TROPONIN I: 0.03 NG/ML
POC GGT: 129 U/L (ref 5–65)
POC PTINR: 0.9 (ref 0.9–1.2)
POC PTWBT: 11 SEC (ref 9.7–14.3)
POC TCO2: 25 MMOL/L (ref 18–33)
POCT GLUCOSE: 120 MG/DL (ref 70–110)
POCT GLUCOSE: 89 MG/DL (ref 70–110)
POTASSIUM BLD-SCNC: 4.3 MMOL/L (ref 3.6–5.1)
PROTEIN, POC UA: NEGATIVE
PROTEIN, POC: 7.3 G/DL (ref 6.4–8.1)
PROTEIN, POC: 7.3 G/DL (ref 6.4–8.1)
SAMPLE: NORMAL
SODIUM BLD-SCNC: 147 MMOL/L (ref 128–145)
SPECIFIC GRAVITY, POC UA: >=1.03
UROBILINOGEN, POC UA: 0.2 E.U./DL

## 2018-10-29 PROCEDURE — 82150 ASSAY OF AMYLASE: CPT

## 2018-10-29 PROCEDURE — 82962 GLUCOSE BLOOD TEST: CPT | Mod: HCNC

## 2018-10-29 PROCEDURE — 93010 ELECTROCARDIOGRAM REPORT: CPT | Mod: HCNC,,, | Performed by: INTERNAL MEDICINE

## 2018-10-29 PROCEDURE — 99285 EMERGENCY DEPT VISIT HI MDM: CPT | Mod: 25,HCNC

## 2018-10-29 PROCEDURE — 25000003 PHARM REV CODE 250: Performed by: NURSE PRACTITIONER

## 2018-10-29 PROCEDURE — 11000001 HC ACUTE MED/SURG PRIVATE ROOM

## 2018-10-29 PROCEDURE — 83880 ASSAY OF NATRIURETIC PEPTIDE: CPT

## 2018-10-29 PROCEDURE — 96376 TX/PRO/DX INJ SAME DRUG ADON: CPT | Mod: HCNC,59

## 2018-10-29 PROCEDURE — 63600175 PHARM REV CODE 636 W HCPCS: Performed by: EMERGENCY MEDICINE

## 2018-10-29 PROCEDURE — 25500020 PHARM REV CODE 255: Performed by: EMERGENCY MEDICINE

## 2018-10-29 PROCEDURE — 84484 ASSAY OF TROPONIN QUANT: CPT

## 2018-10-29 PROCEDURE — 81003 URINALYSIS AUTO W/O SCOPE: CPT

## 2018-10-29 PROCEDURE — 85610 PROTHROMBIN TIME: CPT

## 2018-10-29 PROCEDURE — 96374 THER/PROPH/DIAG INJ IV PUSH: CPT | Mod: HCNC

## 2018-10-29 PROCEDURE — 85025 COMPLETE CBC W/AUTO DIFF WBC: CPT

## 2018-10-29 PROCEDURE — 80053 COMPREHEN METABOLIC PANEL: CPT

## 2018-10-29 PROCEDURE — 93005 ELECTROCARDIOGRAM TRACING: CPT | Mod: HCNC

## 2018-10-29 RX ORDER — MORPHINE SULFATE 8 MG/ML
2 INJECTION INTRAMUSCULAR; INTRAVENOUS; SUBCUTANEOUS
Status: COMPLETED | OUTPATIENT
Start: 2018-10-29 | End: 2018-10-29

## 2018-10-29 RX ORDER — NITROGLYCERIN 0.4 MG/1
0.4 TABLET SUBLINGUAL
Status: COMPLETED | OUTPATIENT
Start: 2018-10-29 | End: 2018-10-29

## 2018-10-29 RX ORDER — MORPHINE SULFATE 8 MG/ML
4 INJECTION INTRAMUSCULAR; INTRAVENOUS; SUBCUTANEOUS
Status: COMPLETED | OUTPATIENT
Start: 2018-10-29 | End: 2018-10-29

## 2018-10-29 RX ADMIN — LIDOCAINE HYDROCHLORIDE: 20 SOLUTION ORAL; TOPICAL at 05:10

## 2018-10-29 RX ADMIN — IOHEXOL 100 ML: 350 INJECTION, SOLUTION INTRAVENOUS at 08:10

## 2018-10-29 RX ADMIN — NITROGLYCERIN 0.4 MG: 0.4 TABLET SUBLINGUAL at 08:10

## 2018-10-29 RX ADMIN — MORPHINE SULFATE 4 MG: 8 INJECTION INTRAVENOUS at 08:10

## 2018-10-29 RX ADMIN — MORPHINE SULFATE 2 MG: 8 INJECTION INTRAVENOUS at 11:10

## 2018-10-29 NOTE — ED PROVIDER NOTES
Encounter Date: 10/29/2018    SCRIBE #1 NOTE: I, Helen Serrano, am scribing for, and in the presence of,  Dr. Cortés. I have scribed the following portions of the note - Other sections scribed: HPI, ROS, PE.   SCRIBE #2 NOTE: I, Mercedes Ha, am scribing for, and in the presence of, Dr. Cortés.     History     Chief Complaint   Patient presents with    Epigastric Pain     patient reports having upper abdominal area. patient reports pain started 2 weeks ago.       A 68-year-old male with history of CABG presents to the ED with complaints of sudden onset epigastric pain which started at 1:00 today while sitting down, which is about an hour after he ate meat pies for lunch.  Patient also reports pain to the left side of his chest when he attempted to lie down. Patient describes epigastric pain as a 8 out of 10 and chest pain as a 4 out of 10.He is experiencing symptoms of nausea, hiccups, diaphoresis, and chills.  He denies shortness of breath, diarrhea, black or bloody stool, hematuria, and changes in urination. Patient stopped taking  mg 4 days ago as requested by doctor, because he will be having lumbar injections in 2 days. Patient denies PSHx of abdominal surgeries.      The history is provided by the patient.     Review of patient's allergies indicates:   Allergen Reactions    Penicillins Other (See Comments)     Unknown reaction, Had a reaction as a child    Shrimp Itching     Hand itching     Past Medical History:   Diagnosis Date    Chronic midline low back pain without sciatica 10/2/2017    Diabetes mellitus with neurological manifestations, uncontrolled 1/24/2017    Diabetic polyneuropathy associated with type 2 diabetes mellitus 1/24/2017    Diabetic polyneuropathy associated with type 2 diabetes mellitus     Essential hypertension 1/24/2017    Gastroesophageal reflux disease 1/24/2017    Hyperlipidemia 1/24/2017    Insomnia 1/24/2017    Long-term insulin use 1/24/2017    Lumbar  spondylosis 11/13/2017    Nuclear sclerosis of both eyes 8/24/2017    Obesity     Uncontrolled type 2 diabetes mellitus without complication, with long-term current use of insulin 1/24/2017     Past Surgical History:   Procedure Laterality Date    BACK SURGERY      2002    CATARACT EXTRACTION W/  INTRAOCULAR LENS IMPLANT Right 08/29/2017    Dr. Hong    COLONOSCOPY N/A 2/21/2017    Procedure: COLONOSCOPY;  Surgeon: Robb Rosado MD;  Location: [facility];  Service: Endoscopy;  Laterality: N/A;    COLONOSCOPY N/A 2/21/2017    Performed by Robb Rosado MD at [facility]    CORONARY ARTERY BYPASS GRAFT      March 2016    INJECTION-STEROID-EPIDURAL-TRANSFORAMINAL Right 11/29/2017    Performed by Eze Byrd Jr., MD at [facility] OR    INSERTION-INTRAOCULAR LENS (IOL) Right 8/29/2017    Performed by Nickolas Hong [facility] at [facility] OR [facility]    PHACOEMULSIFICATION-ASPIRATION-CATARACT Right 8/29/2017    Performed by [facility]olas [facility] at [facility] OR [facility]    TONSILLECTOMY       Family History   Problem Relation Age of Onset    Depression Mother     Heart disease Mother     Stroke Father     No Known Problems Sister     Blindness Brother     Diabetes Sister     No Known Problems Sister     No Known Problems Maternal Aunt     No Known Problems Maternal Uncle     No Known Problems Paternal Aunt     No Known Problems Paternal Uncle     No Known Problems Maternal Grandmother     No Known Problems Maternal Grandfather     No Known Problems Paternal Grandmother     No Known Problems Paternal Grandfather     Amblyopia Neg Hx     Cancer Neg Hx     Cataracts Neg Hx     Glaucoma Neg Hx     Hypertension Neg Hx     Macular degeneration Neg Hx     Retinal detachment Neg Hx     Strabismus Neg Hx     Thyroid disease Neg Hx      Social History     Tobacco Use    Smoking status: Never Smoker    Smokeless tobacco: Never Used   Substance Use Topics    Alcohol use: No    Drug use: No      Review of Systems   Constitutional: Positive for chills and diaphoresis. Negative for activity change.   HENT: Negative for congestion.    Eyes: Negative for discharge.   Respiratory: Negative for shortness of breath.    Cardiovascular: Positive for chest pain.        Left-sided chest pain when he lies down    Gastrointestinal: Positive for abdominal pain, constipation (sometimes) and nausea. Negative for blood in stool and vomiting.        Epigastric pain   Genitourinary: Negative for difficulty urinating, dysuria and hematuria.   Neurological: Negative for weakness.   Hematological: Does not bruise/bleed easily.   All other systems reviewed and are negative.      Physical Exam     Initial Vitals [10/29/18 1659]   BP Pulse Resp Temp SpO2   (!) 198/82 (!) 58 20 97.7 °F (36.5 °C) 100 %      MAP       --         Physical Exam    Nursing note and vitals reviewed.  Constitutional: He appears well-developed and well-nourished. He is not diaphoretic. No distress.   HENT:   Head: Normocephalic and atraumatic.   Eyes: Conjunctivae are normal.   Neck: Normal range of motion and phonation normal. Neck supple.   Cardiovascular: Normal rate and intact distal pulses.   Pulmonary/Chest: Effort normal. No stridor. No respiratory distress.   Abdominal: He exhibits distension.   Musculoskeletal: Normal range of motion. He exhibits no edema.   Neurological: He is alert and oriented to person, place, and time.   Skin: Skin is warm and dry.   Psychiatric: He has a normal mood and affect. His behavior is normal.         ED Course   Procedures  Labs Reviewed   POCT CBC   POCT URINALYSIS W/O SCOPE   POCT CMP   POCT TROPONIN   POCT B-TYPE NATRIURETIC PEPTIDE (BNP)   POCT AMYLASE          Imaging Results    None          Medical Decision Making:   Clinical Tests:   Lab Tests: Ordered and Reviewed       <> Summary of Lab: WBC 6.7  H&H 13.9/47.7  MCV 79.9  RDW% 13.6                  Scribe Attestation:   Scribe #1: I performed the  above scribed service and the documentation accurately describes the services I performed. I attest to the accuracy of the note.  Scribe #2: I performed the above scribed service and the documentation accurately describes the services I performed. I attest to the accuracy of the note.        Labs Reviewed  Admission on 10/29/2018   Component Date Value Ref Range Status    Albumin, POC 10/29/2018 3.9  3.3 - 5.5 g/dL Final    Alkaline Phosphatase, POC 10/29/2018 28* 53 - 128 U/L Final    ALT (SGPT), POC 10/29/2018 60* 10 - 47 U/L Final    Amylase, POC 10/29/2018 50  14 - 97 U/L Final    AST (SGOT), POC 10/29/2018 96* 11 - 38 U/L Final    POC GGT 10/29/2018 129* 5 - 65 U/L Final    Bilirubin 10/29/2018 0.8  0.2 - 1.6 mg/dL Final    Protein 10/29/2018 7.3  6.4 - 8.1 g/dL Final    POCT Glucose 10/29/2018 89  70 - 110 mg/dL Final    POC PTWBT 10/29/2018 11.0  9.7 - 14.3 sec Final    POC PTINR 10/29/2018 0.9  0.9 - 1.2 Final    Sample 10/29/2018 UNK   Final    POC Cardiac Troponin I 10/29/2018 0.03  <0.09 ng/mL Final    Sample 10/29/2018 UNK   Final    POC B-Type Natriuretic Peptide 10/29/2018 46.5  0.0 - 100.0 pg/mL Final    Albumin, POC 10/29/2018 3.8  3.3 - 5.5 g/dL Final    Alkaline Phosphatase, POC 10/29/2018 31* 53 - 128 U/L Final    ALT (SGPT), POC 10/29/2018 63* 10 - 47 U/L Final    AST (SGOT), POC 10/29/2018 92* 11 - 38 U/L Final    POC BUN 10/29/2018 24* 7 - 22 mg/dL Final    Calcium, POC 10/29/2018 9.6  8.0 - 10.3 mg/dL Final    POC Chloride 10/29/2018 106  98 - 108 mmol/L Final    POC Creatinine 10/29/2018 1.0  0.6 - 1.2 mg/dL Final    POC Glucose 10/29/2018 84  73 - 118 mg/dL Final    POC Potassium 10/29/2018 4.3  3.6 - 5.1 mmol/L Final    POC Sodium 10/29/2018 147* 128 - 145 mmol/L Final    Bilirubin 10/29/2018 0.7  0.2 - 1.6 mg/dL Final    POC TCO2 10/29/2018 25  18 - 33 mmol/L Final    Protein 10/29/2018 7.3  6.4 - 8.1 g/dL Final    Glucose, UA 10/29/2018 Negative*  Final     Bilirubin, UA 10/29/2018 Negative*  Final    Ketones, UA 10/29/2018 Negative*  Final    Spec Grav UA 10/29/2018 >=1.030*  Final    Blood, UA 10/29/2018 Negative*  Final    PH, UA 10/29/2018 5.5   Final    Protein, UA 10/29/2018 Negative*  Final    Urobilinogen, UA 10/29/2018 0.2  E.U./dL Final    Nitrite, UA 10/29/2018 Negative*  Final    Leukocytes, UA 10/29/2018 Negative*  Final    Color, UA 10/29/2018 Yellow   Final    Clarity, UA 10/29/2018 Clear   Final    POCT Glucose 10/29/2018 120* 70 - 110 mg/dL Final    POC Cardiac Troponin I 10/29/2018 0.03  <0.09 ng/mL Final    Sample 10/29/2018 UNK   Final        Imaging Reviewed    Imaging Results          X-Ray Chest AP Portable (Final result)  Result time 10/29/18 23:45:51    Final result by Karen Piedra MD (10/29/18 23:45:51)                 Impression:      NG tube in place.    No acute cardiopulmonary process identified.      Electronically signed by: Karen iPedra MD  Date:    10/29/2018  Time:    23:45             Narrative:    EXAMINATION:  XR CHEST AP PORTABLE    CLINICAL HISTORY:  Unspecified intestinal obstruction, unspecified as to partial versus complete obstruction    TECHNIQUE:  Single frontal view of the chest was performed.    COMPARISON:  17:17.    FINDINGS:  Cardiac silhouette is normal in size.  Lungs are symmetrically expanded.  No evidence of focal consolidative process, pneumothorax, or significant effusion.  Interval placement of NG tube which extends into the gastric fundus..                               CTA Chest Non Coronary (Final result)  Result time 10/29/18 21:36:31   Procedure changed from CTA Chest Abdomen Pelvis     Final result by Karen Piedra MD (10/29/18 21:36:31)                 Impression:      1. No evidence of aortic aneurysm or dissection.  2. Prominence of small bowel loops within the left upper quadrant with air-fluid levels seen.  Findings could relate to partial developing small bowel obstruction  noting decompressed distal small bowel loops.  No mechanical cause for obstruction seen.  3. Multiple additional findings as detailed above.      Electronically signed by: Karen Piedra MD  Date:    10/29/2018  Time:    21:36             Narrative:    EXAMINATION:  CTA ABDOMEN AND PELVIS; CTA CHEST NON CORONARY    CLINICAL HISTORY:  epigastric pain and chest pain;    TECHNIQUE:  CTA of the chest abdomen and pelvis was performed following administration of 100 cc Omnipaque 350 IV contrast.    COMPARISON:  None    FINDINGS:  No evidence of aortic aneurysm or dissection.  No evidence of PE.  Heart is normal in size without pericardial effusion.  Coronary artery calcification is seen with sternotomy and postsurgical changes suggestive for prior CABG procedure.  Moderate atherosclerosis is seen within the distal abdominal aorta.  Small right inferior thyroid nodule is visualized measuring 0.8 cm.  Esophagus is unremarkable along its course.  Airways are patent.  Lungs are symmetrically expanded.  No evidence of pulmonary mass, consolidation, or effusion.  Calcified granuloma is noted on the right.    Hepatic cyst is visualized.  Stomach, gallbladder, spleen, pancreas, adrenal glands, and kidneys show no significant abnormalities.  Urinary bladder is nondistended.  Prostate is unremarkable.  There is prominence of small bowel loops within the left upper quadrant with air-fluid levels and measuring upwards of 3 cm in caliber.  Distal small bowel loops are relatively decompressed.  Colon is normal in appearance with moderate stool seen.  No free air or free fluid.  Kidneys are functioning.  Small fat containing bilateral inguinal hernias are noted.  No acute osseous abnormality identified.  Multilevel degenerative changes are visualized throughout the spine.                               CTA Abdomen and Pelvis (Final result)  Result time 10/29/18 21:36:31    Final result by Karen Piedra MD (10/29/18 21:36:31)                  Impression:      1. No evidence of aortic aneurysm or dissection.  2. Prominence of small bowel loops within the left upper quadrant with air-fluid levels seen.  Findings could relate to partial developing small bowel obstruction noting decompressed distal small bowel loops.  No mechanical cause for obstruction seen.  3. Multiple additional findings as detailed above.      Electronically signed by: Karen Piedra MD  Date:    10/29/2018  Time:    21:36             Narrative:    EXAMINATION:  CTA ABDOMEN AND PELVIS; CTA CHEST NON CORONARY    CLINICAL HISTORY:  epigastric pain and chest pain;    TECHNIQUE:  CTA of the chest abdomen and pelvis was performed following administration of 100 cc Omnipaque 350 IV contrast.    COMPARISON:  None    FINDINGS:  No evidence of aortic aneurysm or dissection.  No evidence of PE.  Heart is normal in size without pericardial effusion.  Coronary artery calcification is seen with sternotomy and postsurgical changes suggestive for prior CABG procedure.  Moderate atherosclerosis is seen within the distal abdominal aorta.  Small right inferior thyroid nodule is visualized measuring 0.8 cm.  Esophagus is unremarkable along its course.  Airways are patent.  Lungs are symmetrically expanded.  No evidence of pulmonary mass, consolidation, or effusion.  Calcified granuloma is noted on the right.    Hepatic cyst is visualized.  Stomach, gallbladder, spleen, pancreas, adrenal glands, and kidneys show no significant abnormalities.  Urinary bladder is nondistended.  Prostate is unremarkable.  There is prominence of small bowel loops within the left upper quadrant with air-fluid levels and measuring upwards of 3 cm in caliber.  Distal small bowel loops are relatively decompressed.  Colon is normal in appearance with moderate stool seen.  No free air or free fluid.  Kidneys are functioning.  Small fat containing bilateral inguinal hernias are noted.  No acute osseous abnormality  identified.  Multilevel degenerative changes are visualized throughout the spine.                               X-Ray Chest PA And Lateral (Final result)  Result time 10/29/18 17:25:00    Final result by Ponce Brothers MD (10/29/18 17:25:00)                 Impression:      Status post cardiac surgery.  No acute cardiopulmonary disease      Electronically signed by: Ponce Brothers MD  Date:    10/29/2018  Time:    17:25             Narrative:    EXAMINATION:  XR CHEST PA AND LATERAL    CLINICAL HISTORY:  pain;    TECHNIQUE:  PA and lateral views of the chest were performed.    COMPARISON:  None    FINDINGS:  Postoperative changes from sternotomy are present in the chest, possibly CABG.  Heart size and mediastinal contour are normal.  The lungs are expanded and clear.  No lung consolidation or pleural fluid is identified.  Skeletal structures show degenerative spine changes and sternal wires which are aligned.                                Medications given in ED    Medications   (pyxis) gi cocktail (mylanta 30 mL, lidocaine 2 % viscous 10 mL, dicyclomine 10 mL) 50 mL ( Oral Given 10/29/18 1715)   nitroGLYCERIN SL tablet 0.4 mg (0.4 mg Sublingual Given 10/29/18 2012)   morphine injection 4 mg (4 mg Intravenous Given 10/29/18 2029)   omnipaque 350 iohexol 100 mL (100 mLs Intravenous Given 10/29/18 2051)       This document was produced by a scribe under my direction and in my presence. I agree with the content of the note and have made any necessary edits.     Deborah Cortés MD         Note was created using voice recognition software. Note may have occasional typographical errors that may not have been identified and edited despite good tex initial review prior to signing.          ED Course as of Nov 04 2136   Mon Oct 29, 2018   2136 Pain resolved with meds given in ED.  [DL]   2206 Sample: UNK [DL]      ED Course User Index  [DL] Deborah Cortés MD     Clinical Impression:     1. Partial small bowel  obstruction    2. Epigastric pain    3. Bowel obstruction            Disposition:   Disposition: Admitted  Condition: Stable  Reason for referral: partial SBO  [facility] - accepted by Gen Surg - Dr. June Cortés MD  11/04/18 1100

## 2018-10-30 ENCOUNTER — TELEPHONE (OUTPATIENT)
Dept: PAIN MEDICINE | Facility: CLINIC | Age: 68
End: 2018-10-30

## 2018-10-30 LAB
POCT GLUCOSE: 105 MG/DL (ref 70–110)
POCT GLUCOSE: 85 MG/DL (ref 70–110)
POCT GLUCOSE: 86 MG/DL (ref 70–110)
POCT GLUCOSE: 92 MG/DL (ref 70–110)

## 2018-10-30 PROCEDURE — C9113 INJ PANTOPRAZOLE SODIUM, VIA: HCPCS | Mod: HCNC | Performed by: EMERGENCY MEDICINE

## 2018-10-30 PROCEDURE — 94761 N-INVAS EAR/PLS OXIMETRY MLT: CPT | Mod: HCNC

## 2018-10-30 PROCEDURE — G0378 HOSPITAL OBSERVATION PER HR: HCPCS | Mod: HCNC

## 2018-10-30 PROCEDURE — 11000001 HC ACUTE MED/SURG PRIVATE ROOM: Mod: HCNC

## 2018-10-30 PROCEDURE — 25500020 PHARM REV CODE 255: Mod: HCNC | Performed by: SURGERY

## 2018-10-30 PROCEDURE — 63600175 PHARM REV CODE 636 W HCPCS: Mod: HCNC | Performed by: SURGERY

## 2018-10-30 PROCEDURE — 25000003 PHARM REV CODE 250: Mod: HCNC | Performed by: EMERGENCY MEDICINE

## 2018-10-30 PROCEDURE — 63600175 PHARM REV CODE 636 W HCPCS: Mod: HCNC | Performed by: EMERGENCY MEDICINE

## 2018-10-30 RX ORDER — PANTOPRAZOLE SODIUM 40 MG/10ML
40 INJECTION, POWDER, LYOPHILIZED, FOR SOLUTION INTRAVENOUS DAILY
Status: DISCONTINUED | OUTPATIENT
Start: 2018-10-30 | End: 2018-10-31 | Stop reason: HOSPADM

## 2018-10-30 RX ORDER — KETOROLAC TROMETHAMINE 30 MG/ML
15 INJECTION, SOLUTION INTRAMUSCULAR; INTRAVENOUS EVERY 6 HOURS PRN
Status: DISCONTINUED | OUTPATIENT
Start: 2018-10-30 | End: 2018-10-31 | Stop reason: HOSPADM

## 2018-10-30 RX ORDER — GLUCAGON 1 MG
1 KIT INJECTION
Status: DISCONTINUED | OUTPATIENT
Start: 2018-10-30 | End: 2018-10-31 | Stop reason: HOSPADM

## 2018-10-30 RX ORDER — HYDRALAZINE HYDROCHLORIDE 20 MG/ML
10 INJECTION INTRAMUSCULAR; INTRAVENOUS EVERY 6 HOURS PRN
Status: DISCONTINUED | OUTPATIENT
Start: 2018-10-30 | End: 2018-10-31 | Stop reason: HOSPADM

## 2018-10-30 RX ORDER — SODIUM CHLORIDE 9 MG/ML
INJECTION, SOLUTION INTRAVENOUS CONTINUOUS
Status: DISCONTINUED | OUTPATIENT
Start: 2018-10-30 | End: 2018-10-31 | Stop reason: HOSPADM

## 2018-10-30 RX ORDER — INSULIN ASPART 100 [IU]/ML
0-5 INJECTION, SOLUTION INTRAVENOUS; SUBCUTANEOUS EVERY 6 HOURS PRN
Status: DISCONTINUED | OUTPATIENT
Start: 2018-10-30 | End: 2018-10-31 | Stop reason: HOSPADM

## 2018-10-30 RX ORDER — MORPHINE SULFATE 4 MG/ML
2 INJECTION, SOLUTION INTRAMUSCULAR; INTRAVENOUS EVERY 4 HOURS PRN
Status: DISCONTINUED | OUTPATIENT
Start: 2018-10-30 | End: 2018-10-31 | Stop reason: HOSPADM

## 2018-10-30 RX ORDER — ENOXAPARIN SODIUM 100 MG/ML
40 INJECTION SUBCUTANEOUS EVERY 24 HOURS
Status: DISCONTINUED | OUTPATIENT
Start: 2018-10-30 | End: 2018-10-31 | Stop reason: HOSPADM

## 2018-10-30 RX ORDER — ONDANSETRON 2 MG/ML
4 INJECTION INTRAMUSCULAR; INTRAVENOUS EVERY 8 HOURS PRN
Status: DISCONTINUED | OUTPATIENT
Start: 2018-10-30 | End: 2018-10-31 | Stop reason: HOSPADM

## 2018-10-30 RX ORDER — HYDROMORPHONE HYDROCHLORIDE 2 MG/ML
1 INJECTION, SOLUTION INTRAMUSCULAR; INTRAVENOUS; SUBCUTANEOUS EVERY 4 HOURS PRN
Status: DISCONTINUED | OUTPATIENT
Start: 2018-10-30 | End: 2018-10-31 | Stop reason: HOSPADM

## 2018-10-30 RX ADMIN — SODIUM CHLORIDE: 0.9 INJECTION, SOLUTION INTRAVENOUS at 03:10

## 2018-10-30 RX ADMIN — KETOROLAC TROMETHAMINE 15 MG: 30 INJECTION, SOLUTION INTRAMUSCULAR at 07:10

## 2018-10-30 RX ADMIN — HYDRALAZINE HYDROCHLORIDE 10 MG: 20 INJECTION INTRAMUSCULAR; INTRAVENOUS at 08:10

## 2018-10-30 RX ADMIN — SODIUM CHLORIDE: 0.9 INJECTION, SOLUTION INTRAVENOUS at 07:10

## 2018-10-30 RX ADMIN — PANTOPRAZOLE SODIUM 40 MG: 40 INJECTION, POWDER, FOR SOLUTION INTRAVENOUS at 08:10

## 2018-10-30 RX ADMIN — DIATRIZOATE MEGLUMINE AND DIATRIZOATE SODIUM 120 ML: 660; 100 LIQUID ORAL; RECTAL at 11:10

## 2018-10-30 RX ADMIN — ENOXAPARIN SODIUM 40 MG: 100 INJECTION SUBCUTANEOUS at 05:10

## 2018-10-30 RX ADMIN — KETOROLAC TROMETHAMINE 15 MG: 30 INJECTION, SOLUTION INTRAMUSCULAR at 09:10

## 2018-10-30 RX ADMIN — HYDRALAZINE HYDROCHLORIDE 10 MG: 20 INJECTION INTRAMUSCULAR; INTRAVENOUS at 11:10

## 2018-10-30 RX ADMIN — KETOROLAC TROMETHAMINE 15 MG: 30 INJECTION, SOLUTION INTRAMUSCULAR at 01:10

## 2018-10-30 NOTE — PROGRESS NOTES
Pt AOx4, educated on care, meds, communication, safety, rounding.  Pt home meds reviewed.   Pt refuses VTE measures. Safety in place. No s/s of distress.

## 2018-10-30 NOTE — PLAN OF CARE
Problem: Patient Care Overview  Goal: Plan of Care Review  Outcome: Ongoing (interventions implemented as appropriate)  Patient remains free from injuries this shift, AAO x 4, can make his needs known, NGT to left nare LIWS, no drainage this shift, c/o HA PRN medication given as ordered, ambulated to bathroom, several BM after Gastrogaffin admin, safety maintained, no other concerns voiced.

## 2018-10-30 NOTE — ED NOTES
Nurses at bedside to attempt NG insertion, pt tolerated well, ausculation to confirm placement, tube is at  60 at L nare

## 2018-10-30 NOTE — PLAN OF CARE
10/30/18 1246   Discharge Assessment   Assessment Type Discharge Planning Assessment   Confirmed/corrected address and phone number on facesheet? Yes   Assessment information obtained from? Patient   Prior to hospitilization cognitive status: Alert/Oriented   Prior to hospitalization functional status: Independent   Current cognitive status: Alert/Oriented   Current Functional Status: Independent   Lives With spouse   Able to Return to Prior Arrangements yes   Is patient able to care for self after discharge? Yes   Who are your caregiver(s) and their phone number(s)? Spouse Ada   Patient's perception of discharge disposition home or selfcare   Readmission Within The Last 30 Days no previous admission in last 30 days   Equipment Currently Used at Home glucometer   Do you have any problems affording any of your prescribed medications? No   Is the patient taking medications as prescribed? yes   Does the patient have transportation home? Yes   Transportation Available car;family or friend will provide   Discharge Plan A Home with family   Discharge Plan B Home with family   Patient/Family In Agreement With Plan yes       [facility] - [place], [place] - [address]  [address] [address]  Phone: [phone] Fax: [phone]    [facility] [id number]  [place] - [address] AT [facility] [address]  [address]  [place] [address]  Phone: [phone] Fax: [phone]

## 2018-10-30 NOTE — PLAN OF CARE
Problem: Patient Care Overview  Goal: Plan of Care Review  Pt states he has no nausea, no abd discomfort/ pain. Pt alert. Pt anxious and wishes to go home. Pt fever free. 0600 BG= 85. Pt has no symptoms of hypoglycemia. AOx4

## 2018-10-30 NOTE — ED NOTES
[facility] called to transport pt to [facility] within the hour, primary nurse notified

## 2018-10-30 NOTE — H&P
HISTORY OF PRESENT ILLNESS:  A 68-year-old male with the acute onset yesterday   of sharp left upper abdominal discomfort and nausea, which he has never had in   the past.  He denies vomiting.  Normally, he has good oral intake and regular   brown bowel movements.  He denies shortness of breath or chest pain.  He denies   fever or chills.  He denies hematemesis, melena or hematochezia.  He stopped   taking aspirin several days ago for a potential lumbar injection scheduled for   tomorrow.  He reports having had a colonoscopy about two years ago, which was   normal by his history.    PAST MEDICAL HISTORY:  Diabetes, hypertension, neuropathy, gastroesophageal   reflux disease, hyperlipidemia and back pain.    PAST SURGICAL HISTORY:  Back surgery, cataract extraction, colonoscopy, coronary   stent placement and tonsillectomy.    ALLERGIES:  PENICILLIN AND SHRIMP.    SOCIAL HISTORY:  The patient does not smoke or consume alcoholic beverages.    HOME MEDICATIONS:  Aspirin, Lipitor, Neurontin, Prinivil, Glucophage, Toprol,   Starlix, Prilosec, Revatio, Zanaflex, Halcion and Lantus.    PHYSICAL EXAMINATION:  VITAL SIGNS:  Blood pressure 141/74, respiratory rate 18, pulse 62, temperature   97.6.  GENERAL:  Alert and oriented x4.  HEENT:  No cervical or supraclavicular masses.  Nasogastric tube is in place   with no significant output-abdominal film confirms the tip of the nasogastric   tube was within the stomach.  LUNGS:  Clear to auscultation bilaterally.  CARDIOVASCULAR:  Regular rate and rhythm.  AXILLA:  No masses bilaterally.  GENITOURINARY:  No inguinal hernia bilaterally.  ABDOMEN:  Obese, soft, positive bowel sounds, nontender and nondistended.  No   masses.  No hernia.    LABORATORY DATA:  His BUN is 24, his creatinine is 1.0.  His CBC from yesterday   was normal.  His transaminases are minimally elevated.  His amylase is 50.  His   total bilirubin is 0.8.  CT scan of the chest revealed no aortic aneurysm or    dissection.  CT scan of his abdomen revealed prominence of the small bowel loops   in the left upper quadrant with air-fluid levels.  There is no transition   zone-I am currently unable to pull up and review of films.    ASSESSMENT AND PLAN:  Possible small bowel obstruction-he has no significant   nasogastric tube output and his presenting complaints of abdominal pain have   resolved-long discussion with the patient and multiple family members-we will   give Gastrografin challenge today, as no sign of mechanical obstruction may be   of remove nasogastric tube and start liquids later on today, if small bowel   obstruction present and has not resolved with nonoperative means could end up   requiring operative intervention this hospital stay, discussed with the patient   at length.      /branden [id number] shanon(s)        GABRIELLE/SHANIA  dd: 10/30/2018 07:39:45 (CDT)  td: 10/30/2018 08:12:33 (CDT)  Doc ID   #[id number]  Job ID #[id number]    CC:

## 2018-10-31 ENCOUNTER — TELEPHONE (OUTPATIENT)
Dept: CARDIOLOGY | Facility: CLINIC | Age: 68
End: 2018-10-31

## 2018-10-31 VITALS
OXYGEN SATURATION: 99 % | TEMPERATURE: 99 F | RESPIRATION RATE: 20 BRPM | SYSTOLIC BLOOD PRESSURE: 183 MMHG | BODY MASS INDEX: 30.24 KG/M2 | HEART RATE: 68 BPM | HEIGHT: 67 IN | DIASTOLIC BLOOD PRESSURE: 80 MMHG | WEIGHT: 192.63 LBS

## 2018-10-31 LAB — POCT GLUCOSE: 99 MG/DL (ref 70–110)

## 2018-10-31 PROCEDURE — 63600175 PHARM REV CODE 636 W HCPCS: Mod: HCNC | Performed by: SURGERY

## 2018-10-31 PROCEDURE — 25000003 PHARM REV CODE 250: Mod: HCNC | Performed by: EMERGENCY MEDICINE

## 2018-10-31 PROCEDURE — C9113 INJ PANTOPRAZOLE SODIUM, VIA: HCPCS | Mod: HCNC | Performed by: EMERGENCY MEDICINE

## 2018-10-31 PROCEDURE — 63600175 PHARM REV CODE 636 W HCPCS: Mod: HCNC | Performed by: EMERGENCY MEDICINE

## 2018-10-31 PROCEDURE — G0378 HOSPITAL OBSERVATION PER HR: HCPCS | Mod: HCNC

## 2018-10-31 RX ADMIN — KETOROLAC TROMETHAMINE 15 MG: 30 INJECTION, SOLUTION INTRAMUSCULAR at 04:10

## 2018-10-31 RX ADMIN — PANTOPRAZOLE SODIUM 40 MG: 40 INJECTION, POWDER, FOR SOLUTION INTRAVENOUS at 08:10

## 2018-10-31 RX ADMIN — SODIUM CHLORIDE: 0.9 INJECTION, SOLUTION INTRAVENOUS at 04:10

## 2018-10-31 RX ADMIN — SODIUM CHLORIDE: 0.9 INJECTION, SOLUTION INTRAVENOUS at 12:10

## 2018-10-31 NOTE — PLAN OF CARE
Problem: Patient Care Overview  Goal: Plan of Care Review  Outcome: Ongoing (interventions implemented as appropriate)  Pt AAOx4, no falls no injuries.  Family at bedside.  Afebrile.  Pt moves independently in bed.  Headache 4/10 controlled by PRN toradol.  No abdominal pain.  No N/V.  1BM.  No coverage needed for blood sugar.  NGT clamped, tolerated well.  Will continue to monitor.

## 2018-10-31 NOTE — PROGRESS NOTES
Surgery Progress Note    Primary Problem: Partial small bowel obstruction    Other problems:   Active Hospital Problems    Diagnosis  POA    *Partial small bowel obstruction [K56.600]  Yes      Resolved Hospital Problems   No resolved problems to display.       HD #  LOS: 2 days   POD #     Overnight events: NGT clamped overnight. No nausea  Multiple BM yesterday, one this am.  Denies any abdominal pain, feels much better than upon admission     ROS:  No chest pain  No shortness of breath    Diet- NPO    I/O last 3 completed shifts:  In: -   Out: 400 [Urine:400]    Intake/Output Summary (Last 24 hours) at 10/31/2018 0604  Last data filed at 10/30/2018 1000  Gross per 24 hour   Intake --   Output 200 ml   Net -200 ml       Temp:  [97.7 °F (36.5 °C)-98.4 °F (36.9 °C)] 97.9 °F (36.6 °C)  Pulse:  [62-68] 65  Resp:  [16-18] 18  SpO2:  [96 %-99 %] 98 %  BP: (138-192)/(67-86) 147/70    Gen: alert, well appearing, and in no distress,    Chest: Normal respiratory effort    CVS exam: normal rate and regular rhythm.    Abdomen: Soft nontender, non distended    Abd xray- Contrast in colon after 4 hours, no sign sbo    Assessment: Sbo, resolved    Plan: Pulled NGT  Clear liquid diet, adat  Home later today if he continues to improve    Luis M Borges MD

## 2018-10-31 NOTE — PROGRESS NOTES
WRITTEN HEALTHCARE DISCHARGE INFORMATION     Things that YOU are RESPONSIBLE for to Manage Your Care At Home:    1. Getting your prescriptions filled.  2. Taking you medications as directed. DO NOT MISS ANY DOSES!  3. Going to your follow-up doctor appointments. This is important because it allows the doctor to monitor your progress and to determine if any changes need to be made to your treatment plan.    If you are unable to make your follow up appointments, please call the number listed and reschedule this appointment.     ____________HELP AT HOME____________________    Experiencing any SIGNS or SYMPTOMS: YOU CAN    Schedule a same day appopintment with your Primary Care Doctor or  you can call [facility] On Call Nurse Care Line for 24/7 assistance at [phone]    If you are experience any signs or symptoms that have become severe, Call 911 and come to your nearest Emergency Room.    Thank you for choosing [facility] and allowing us to care for you.   From your care management team: RITA Lopez, [facility] [place] [phone]     You should receive a call from [facility] Discharge Department within 48-72 hours to help manage your care after discharge. Please try to make sure that you answer your phone for this important phone call.     Follow-up Information     Jono Willoughby MD On 11/13/2018.    Specialty:  Internal Medicine  Why:  Tuesday at 9:20am. Primary Care Appointment.   Contact information:  [address] [place] [address] [phone]             Eze Byrd Jr, MD On 11/14/2018.    Specialties:  Physical Medicine and Rehabilitation, Pain Medicine  Why:  Wednesday at 7:40am.   Contact information:  [address] [address] [phone]

## 2018-10-31 NOTE — NURSING
Pt name, , ID band and tele box [id number] verified at bedside with Georgina CURIEL.  Information correct on monitor, pt currently in normal sinus rhythm.

## 2018-10-31 NOTE — PLAN OF CARE
"   10/31/18 1402   Final Note   Assessment Type Final Discharge Note   Anticipated Discharge Disposition Home   What phone number can be called within the next 1-3 days to see how you are doing after discharge? [phone]   Hospital Follow Up  Appt(s) scheduled? Yes   Discharge plans and expectations educations in teach back method with documentation complete? Yes   Right Care Referral Info   Post Acute Recommendation No Care     EDUCATION:  Pt provided with educational information on " SBO ".  Information reviewed and placed in :My Healthcare Packet" to be brought home for pt to use as resource after discharge.  Information included:  signs and symptoms to look for and call the doctor if experiencing, and symptoms that may indicate a medical emergency: CALL 911.      All questions answered.  Teach back method used.  Pt  verbalized understanding of all information by stating, "  That he is aware of the signs and symptoms to watch for and when to contact MD and when to report to the ED immediately. Also SW took this time to ask pt to provide at least 3 symptoms. Pt stated Abdominal pain, SOB, or Fever.     "

## 2018-10-31 NOTE — TELEPHONE ENCOUNTER
Left vm to call back   ef      ----- Message from Kelvin Hernandez MD sent at 10/31/2018  1:25 PM CDT -----  Okay to hold until next procedure      --SD    ----- Message -----  From: Sahara Atwood MA  Sent: 10/31/2018   1:17 PM  To: Kelvin Hernandez MD    plz advise    ef  ----- Message -----  From: Tonia Venegas  Sent: 10/31/2018  12:21 PM  To: David SPIVEY Staff    This pt was admitted for SBO and is in observation. He was suppose to have a procedure today with Rochelle and he stop taking his ASA 7 days ago. The procedure has now been rescheduled to the 14th (two weeks from now) and wants to know if he should continue to not take his ASA or stop again 7 days before the procedure

## 2018-10-31 NOTE — PHYSICIAN QUERY
PT Name: Driss Hernandez Jr.  MR #: [id number]     Physician Query Form - Etiology of Condition Clarification      CDS Fatuma Gary RN, BSN        Contact Information:  [phone]    [email or web address]         This form is a permanent document in the medical record.     Query Date: October 31, 2018    By submitting this query, we are merely seeking further clarification of documentation.  Please utilize your independent clinical judgment when addressing the question(s) below.     The medical record contains the following:    Findings Supporting Clinical Information Location in Medical Record     Possible small bowel obstruction           no significant   nasogastric tube output and his presenting complaints of abdominal pain have resolved    Partial small bowel obstruction     Overnight events: NGT clamped overnight. No nausea  Multiple BM yesterday, one this am.  Denies any abdominal pain, feels much better than upon admission   Assessment: Sbo, resolved     Plan: Pulled NGT  Clear liquid diet, adat  Home later today if he continues to improve    Abd xray- Contrast in colon after 4 hours, no sign sbo    1. No evidence of aortic aneurysm or dissection.  2. Prominence of small bowel loops within the left upper quadrant with air-fluid levels seen.  Findings could relate to partial developing small bowel obstruction noting decompressed distal small bowel loops.  No mechanical cause for obstruction seen.   H&P              10/31 General surgery PN                                10/29 CT abdomen and pelvis            Please document your best medical opinion regarding the etiology of   Small bowel obstruction    for which the primary focus of treatment is/was directed.         Please specify etiology of small bowel obstruction:      _________________________________________________________________     _____   Small bowel obstruction ruled out           Clinically Undetermined  x     Please document in your  progress notes daily for the duration of treatment, until resolved, and include in your discharge summary.

## 2018-10-31 NOTE — NURSING
"Pt insisting that MD be called about having the NGT removed, saying he will let us put another one in if we need to but he "wants it out now".  Talking about removing it himself.  Refusing heart monitor.  Dr Cavazos called, no new orders, said to try to keep NGT in until results come back and to try to keep the heart monitor on.  "

## 2018-10-31 NOTE — NURSING
Discharge instructions reviewed with patient & wife, verbalized understanding, no concerns voiced, teley monitor removed, transported downstairs via wheelchair, safety maintained.

## 2018-11-04 LAB — POCT GLUCOSE: 103 MG/DL (ref 70–110)

## 2018-11-07 NOTE — HOSPITAL COURSE
Patient was admitted for SBO. NGT was placed, and he did not have much output. On HD 1 he underwent gastrografin challenge. Gastrograffin challenge was without evidence of obstruction, and his NGT removed. On HD 2 he denied abdominal pain or nausea. He was given a diet which he tolerated. Upon discharge he was without pain, had multiple BM and tolerated a diet.

## 2018-11-07 NOTE — DISCHARGE SUMMARY
[facility]  General Surgery  Discharge Summary      Patient Name: Driss Hernandez Jr.  MRN: [id number]  Admission Date: 10/29/2018  Hospital Length of Stay: 2 days  Discharge Date and Time: 10/31/2018  2:03 PM  Attending Physician: No att. providers found   Discharging Provider: Luis M Borges MD  Primary Care Provider: Jono Willoughby MD    HPI:   Patient presented to hospital with abdominal pain and discomfort. CT scan revealed distended small bowel loops, possible SBO    * No surgery found *      Indwelling Lines/Drains at time of discharge:   Lines/Drains/Airways          None        Hospital Course: Patient was admitted for SBO. NGT was placed, and he did not have much output. On HD 1 he underwent gastrografin challenge. Gastrograffin challenge was without evidence of obstruction, and his NGT removed. On HD 2 he denied abdominal pain or nausea. He was given a diet which he tolerated. Upon discharge he was without pain, had multiple BM and tolerated a diet.    Consults: None    Significant Diagnostic Studies: Radiology:   FINDINGS:  There is a enteric tube in place, the tip of which courses below the diaphragm, projecting over the anticipated gastric lumen.  Enteric contrast is seen within nondilated distal loops of small bowel as well as the colon and rectum.  No significantly dilated small bowel loops are identified.  Limited evaluation of free intraperitoneal air due to technique.  Visualized osseous structures are intact noting postoperative change of prior median sternotomy.  Visualized lung bases are grossly clear.    Pending Diagnostic Studies:     None        Final Active Diagnoses:    Diagnosis Date Noted POA    PRINCIPAL PROBLEM:  Partial small bowel obstruction [K56.600] 10/29/2018 Yes      Problems Resolved During this Admission:      Discharged Condition: good    Disposition: Home or Self Care    Follow Up:  Follow-up Information     Jono Willoughby MD On 11/13/2018.  "   Specialty:  Internal Medicine  Why:  Tuesday at 9:20am. Primary Care Appointment.   Contact information:  [address] [address] [phone]             Eze Byrd Jr, MD On 11/14/2018.    Specialties:  Physical Medicine and Rehabilitation, Pain Medicine  Why:  Wednesday at 7:40am.   Contact information:  [address] [address] [phone]                 Patient Instructions:      Diet Adult Regular     Notify your health care provider if you experience any of the following:  temperature >100.4     Notify your health care provider if you experience any of the following:  persistent nausea and vomiting or diarrhea     Activity as tolerated     Medications:  Reconciled Home Medications:      Medication List      CONTINUE taking these medications    ACCU-CHEK MOIZ CONTROL SOLN Soln  Generic drug:  blood glucose control high,low     ACCU-CHEK MOIZ PLUS TEST STRP Strp  Generic drug:  blood sugar diagnostic  tid     ACCU-CHEK SOFTCLIX LANCETS Misc  Generic drug:  lancets     aspirin 325 MG tablet  Take 325 mg by mouth every morning.     atorvastatin 40 MG tablet  Commonly known as:  LIPITOR  Take 1 tablet (40 mg total) by mouth every morning.     BD ALCOHOL SWABS Padm  Generic drug:  alcohol swabs     * BD ULTRA-FINE MINI PEN NEEDLE 31 gauge x 3/16" Ndle  Generic drug:  pen needle, diabetic     * BD ULTRA-FINE ROLAND PEN NEEDLE 32 gauge x 5/32" Ndle  Generic drug:  pen needle, diabetic     fenofibrate 160 MG Tab  Take 1 tablet (160 mg total) by mouth every morning.     gabapentin 100 MG capsule  Commonly known as:  NEURONTIN  Takes two 100 mg capsules in the morning and three 100 mg capsules with dinner     LANTUS SOLOSTAR U-100 INSULIN 100 unit/mL (3 mL) Inpn pen  Generic drug:  insulin glargine  INJECT 13 UNITS INTO THE SKIN EVERY MORNING.     lisinopril 40 MG tablet  Commonly known as:  PRINIVIL,ZESTRIL  Take 1 tablet (40 mg total) by mouth daily with dinner or evening meal.   "   metFORMIN 500 MG 24 hr tablet  Commonly known as:  GLUCOPHAGE-XR  Take 2 tablets (1,000 mg total) by mouth 2 (two) times daily with meals. Takes two 500 mg twice a day     metoprolol succinate 50 MG 24 hr tablet  Commonly known as:  TOPROL-XL  Take 1 tablet (50 mg total) by mouth daily with dinner or evening meal.     nateglinide 120 MG tablet  Commonly known as:  STARLIX     omeprazole 20 MG capsule  Commonly known as:  PRILOSEC  Take 1 capsule (20 mg total) by mouth daily with dinner or evening meal.     sildenafil 20 mg Tab  Commonly known as:  REVATIO  1-5 pills at a time per day prn     tiZANidine 4 MG tablet  Commonly known as:  ZANAFLEX  Take 4 tablets (16 mg total) by mouth every evening.     triazolam 0.25 MG Tab  Commonly known as:  HALCION  TK 1 T PO QHS, prn         * This list has 2 medication(s) that are the same as other medications prescribed for you. Read the directions carefully, and ask your doctor or other care provider to review them with you.              Time spent on the discharge of patient: 25 minutes    Luis M Borges MD  General Surgery  [facility]

## 2018-11-07 NOTE — HPI
Patient presented to hospital with abdominal pain and discomfort. CT scan revealed distended small bowel loops, possible SBO

## 2018-11-09 DIAGNOSIS — E11.22 CONTROLLED TYPE 2 DIABETES MELLITUS WITH STAGE 3 CHRONIC KIDNEY DISEASE, WITH LONG-TERM CURRENT USE OF INSULIN: ICD-10-CM

## 2018-11-09 DIAGNOSIS — N18.30 CONTROLLED TYPE 2 DIABETES MELLITUS WITH STAGE 3 CHRONIC KIDNEY DISEASE, WITH LONG-TERM CURRENT USE OF INSULIN: ICD-10-CM

## 2018-11-09 DIAGNOSIS — Z79.4 CONTROLLED TYPE 2 DIABETES MELLITUS WITH STAGE 3 CHRONIC KIDNEY DISEASE, WITH LONG-TERM CURRENT USE OF INSULIN: ICD-10-CM

## 2018-11-11 DIAGNOSIS — E78.49 OTHER HYPERLIPIDEMIA: ICD-10-CM

## 2018-11-12 ENCOUNTER — TELEPHONE (OUTPATIENT)
Dept: PAIN MEDICINE | Facility: CLINIC | Age: 68
End: 2018-11-12

## 2018-11-12 RX ORDER — FENOFIBRATE 160 MG/1
TABLET ORAL
Qty: 90 TABLET | Refills: 4 | Status: SHIPPED | OUTPATIENT
Start: 2018-11-12 | End: 2020-01-23 | Stop reason: SDUPTHER

## 2018-11-12 NOTE — TELEPHONE ENCOUNTER
----- Message from Yamilet Lynne sent at 11/12/2018  2:53 PM CST -----  Contact: pt's wife marcia [phone]            Name of Who is Calling: pt wife      What is the request in detail: wants to know when to come in for procedure. Call ada      Can the clinic reply by [facility]: no      What Number to Call Back if not in [facility]: pt's wife marcia [phone]

## 2018-11-13 ENCOUNTER — OFFICE VISIT (OUTPATIENT)
Dept: FAMILY MEDICINE | Facility: CLINIC | Age: 68
End: 2018-11-13
Payer: MEDICARE

## 2018-11-13 VITALS
HEART RATE: 72 BPM | HEIGHT: 67 IN | SYSTOLIC BLOOD PRESSURE: 116 MMHG | DIASTOLIC BLOOD PRESSURE: 78 MMHG | OXYGEN SATURATION: 98 % | WEIGHT: 193.13 LBS | TEMPERATURE: 98 F | BODY MASS INDEX: 30.31 KG/M2

## 2018-11-13 DIAGNOSIS — N52.9 ERECTILE DYSFUNCTION, UNSPECIFIED ERECTILE DYSFUNCTION TYPE: ICD-10-CM

## 2018-11-13 DIAGNOSIS — N18.30 STAGE 3 CHRONIC KIDNEY DISEASE: ICD-10-CM

## 2018-11-13 DIAGNOSIS — M47.816 LUMBAR ARTHROPATHY: ICD-10-CM

## 2018-11-13 DIAGNOSIS — I10 ESSENTIAL HYPERTENSION: ICD-10-CM

## 2018-11-13 DIAGNOSIS — K56.600 PARTIAL SMALL BOWEL OBSTRUCTION: Primary | ICD-10-CM

## 2018-11-13 DIAGNOSIS — Z79.4 LONG-TERM INSULIN USE: ICD-10-CM

## 2018-11-13 PROCEDURE — 99499 UNLISTED E&M SERVICE: CPT | Mod: HCNC,S$GLB,, | Performed by: INTERNAL MEDICINE

## 2018-11-13 PROCEDURE — 99215 OFFICE O/P EST HI 40 MIN: CPT | Mod: HCNC,S$GLB,, | Performed by: INTERNAL MEDICINE

## 2018-11-13 PROCEDURE — 3078F DIAST BP <80 MM HG: CPT | Mod: CPTII,HCNC,S$GLB, | Performed by: INTERNAL MEDICINE

## 2018-11-13 PROCEDURE — 1101F PT FALLS ASSESS-DOCD LE1/YR: CPT | Mod: CPTII,HCNC,S$GLB, | Performed by: INTERNAL MEDICINE

## 2018-11-13 PROCEDURE — 99999 PR PBB SHADOW E&M-EST. PATIENT-LVL III: CPT | Mod: PBBFAC,HCNC,, | Performed by: INTERNAL MEDICINE

## 2018-11-13 PROCEDURE — 3074F SYST BP LT 130 MM HG: CPT | Mod: CPTII,HCNC,S$GLB, | Performed by: INTERNAL MEDICINE

## 2018-11-13 PROCEDURE — 3045F PR MOST RECENT HEMOGLOBIN A1C LEVEL 7.0-9.0%: CPT | Mod: CPTII,HCNC,S$GLB, | Performed by: INTERNAL MEDICINE

## 2018-11-13 RX ORDER — SILDENAFIL CITRATE 20 MG/1
TABLET ORAL
Qty: 30 TABLET | Refills: 11 | Status: SHIPPED | OUTPATIENT
Start: 2018-11-13 | End: 2019-12-11 | Stop reason: SDUPTHER

## 2018-11-13 RX ORDER — SILDENAFIL CITRATE 20 MG/1
TABLET ORAL
Qty: 30 TABLET | Refills: 11 | Status: SHIPPED | OUTPATIENT
Start: 2018-11-13 | End: 2018-11-13 | Stop reason: SDUPTHER

## 2018-11-13 NOTE — PROGRESS NOTES
Chief complaint:  Follow-up from the hospital on discuss issues    68-year-old white male here to follow-up from the hospital.  He has never been in the hospital or been to the emergency room before.  He woke with some bilateral upper abdominal pain.  Fairly severe.  He went to the local freestanding emergency room.  I explained to him why they had to check an for his heart because he did have chest pain. He was diagnosed apparently with a partial small-bowel obstruction.  He verbalizes somewhat unpleasant experience.  I explained him that the NG tube and bowel rest is the typical treatment for the small-bowel obstruction and he does appear to have progressed and responded appropriately and that the time course in the hospital was actually fairly good.  He is happy with the surgeon who saw him.  For about several months he has had some cramps in the lower abdomen every morning and then he goes and has a bowel movement it resolves.  He is back to his 3 bowel movements per day.  We discussed using some MiraLax and stool softeners as possibly he has some relative constipation and excess bowel movements leading to the morning cramping.    He plans to follow up with endocrinology.  He says his sugars are running well since he started weekly injection.  We discussed at length the cost of his medications.  He is using Starlix as well as NovoLog.  He plans to try stopping the NovoLog such as sugars are running well.  I explained him the pathophysiology of how these mealtime medications work and there purpose.  He is apparently getting the weekly medication from a  otherwise he could not afford it.  The NovoLog is excessively expensive.    Continues with erectile dysfunction.  He believes he tried Viagra in the past.  He has not really vacation in a year.  He gets a but is not enough to finish.  We discussed retry the 20 mg Viagra titrating up to an effective dose, proper administration, timing  of administration, stimulation and so forth.  Patient counseled at length regarding the multitude of all these issues above and it was therapeutic for him to go over all these issues.Total time over 45 minutes with over 50% counseling.    We also reviewed that he is following up for pain management for what sounds like trial of facet injections and possible nerve ablation.  He away and get some significant back pain.  He is off aspirin prior to this procedure.    Hospital Course: Patient was admitted for SBO. NGT was placed, and he did not have much output. On HD 1 he underwent gastrografin challenge. Gastrograffin challenge was without evidence of obstruction, and his NGT removed. On HD 2 he denied abdominal pain or nausea. He was given a diet which he tolerated. Upon discharge he was without pain, had multiple BM and tolerated a diet.        ROS:   CONST: weight stable.  No further GI symptoms except as above      Past medical history:  1.  Uncontrolled diabetes with neuropathy, followed by Dr. Agrawal  2.  Coronary artery disease with triple bypass 2016, followed by the heart clinic. Now [facility], neg PET   3.  Back surgery .  5.  Hyperlipidemia.  6.  Hypertension.  7.  Never had colonoscopy  8.  Pneumovax and Prevnar given  according to records  9.  Right leg radiculopathy, confirmed by MRI, did respond epidural with [facility] pain management  10. Partial small-bowel obstruction 2018    Family history: Father  at 77 with stroke.  Mother  at 72 with heart problems, diabetes, hypertension.  2 sisters living in their 80s and one  at 82.  One brother  at 80 with some kidney disease and diabetes.  Sisters with diabetes, hypertension and stroke.  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  He drinks alcohol about 3 times.  Never smoked.      Vital signs as above  Abdomen  "soft and benign, no hepatosplenomegaly or masses, no reproducible pain       Driss was seen today for hospital follow up.    Diagnoses and all orders for this visit:    Partial small bowel obstruction, apparently resolved, suspect he may still have some residual relative constipation will encourage him to promote more bowel movement and assess response    Lumbar arthropathy, set to have trial of facet injections    Uncontrolled type 2 diabetes mellitus with diabetic neuropathy, with long-term current use of insulin, follows with endocrinology, medications and cost discussed    Essential hypertension, chronic and stable    Long-term insulin use, chronic and stable    Stage 3 chronic kidney disease, chronic and stable    Erectile dysfunction, unspecified erectile dysfunction type, new issue, ongoing, trial of the Viagra               Clinical note be sensitive based upon his anxiety referable to these issues.  "This note will not be shared with the patient."  "

## 2018-11-14 ENCOUNTER — HOSPITAL ENCOUNTER (OUTPATIENT)
Facility: HOSPITAL | Age: 68
Discharge: HOME | End: 2018-11-14
Attending: PAIN MEDICINE | Admitting: PAIN MEDICINE
Payer: MEDICARE

## 2018-11-14 ENCOUNTER — PATIENT MESSAGE (OUTPATIENT)
Dept: FAMILY MEDICINE | Facility: CLINIC | Age: 68
End: 2018-11-14

## 2018-11-14 VITALS
DIASTOLIC BLOOD PRESSURE: 88 MMHG | TEMPERATURE: 98 F | OXYGEN SATURATION: 99 % | SYSTOLIC BLOOD PRESSURE: 146 MMHG | RESPIRATION RATE: 16 BRPM | HEART RATE: 73 BPM

## 2018-11-14 DIAGNOSIS — M47.816 LUMBAR SPONDYLOSIS: Primary | ICD-10-CM

## 2018-11-14 PROCEDURE — 64495 INJ PARAVERT F JNT L/S 3 LEV: CPT | Mod: 50,HCNC | Performed by: PAIN MEDICINE

## 2018-11-14 PROCEDURE — 64494 INJ PARAVERT F JNT L/S 2 LEV: CPT | Mod: 50,HCNC,, | Performed by: PAIN MEDICINE

## 2018-11-14 PROCEDURE — 25000003 PHARM REV CODE 250: Mod: HCNC | Performed by: PAIN MEDICINE

## 2018-11-14 PROCEDURE — 64494 INJ PARAVERT F JNT L/S 2 LEV: CPT | Mod: 50,HCNC | Performed by: PAIN MEDICINE

## 2018-11-14 PROCEDURE — 64495 INJ PARAVERT F JNT L/S 3 LEV: CPT | Mod: 50,HCNC,, | Performed by: PAIN MEDICINE

## 2018-11-14 PROCEDURE — 64493 INJ PARAVERT F JNT L/S 1 LEV: CPT | Mod: 50,HCNC,, | Performed by: PAIN MEDICINE

## 2018-11-14 PROCEDURE — 64493 INJ PARAVERT F JNT L/S 1 LEV: CPT | Mod: 50,HCNC | Performed by: PAIN MEDICINE

## 2018-11-14 RX ORDER — BUPIVACAINE HYDROCHLORIDE 2.5 MG/ML
INJECTION, SOLUTION EPIDURAL; INFILTRATION; INTRACAUDAL
Status: DISCONTINUED
Start: 2018-11-14 | End: 2018-11-14 | Stop reason: HOSPADM

## 2018-11-14 RX ORDER — BUPIVACAINE HYDROCHLORIDE 2.5 MG/ML
10 INJECTION, SOLUTION EPIDURAL; INFILTRATION; INTRACAUDAL ONCE
Status: COMPLETED | OUTPATIENT
Start: 2018-11-14 | End: 2018-11-14

## 2018-11-14 RX ORDER — LIDOCAINE HYDROCHLORIDE 20 MG/ML
10 INJECTION, SOLUTION INFILTRATION; PERINEURAL ONCE
Status: COMPLETED | OUTPATIENT
Start: 2018-11-14 | End: 2018-11-14

## 2018-11-14 RX ORDER — LIDOCAINE HYDROCHLORIDE 20 MG/ML
INJECTION, SOLUTION INFILTRATION; PERINEURAL
Status: DISCONTINUED
Start: 2018-11-14 | End: 2018-11-14 | Stop reason: HOSPADM

## 2018-11-14 NOTE — DISCHARGE SUMMARY
Discharge Diagnosis:Lumbar spondylosis [M47.816]  Condition on Discharge: Stable.  Diet on Discharge: Same as before.  Activity: as per instruction sheet.  Discharge to: Home with a responsible adult.  Follow up: as per Discharge instructions

## 2018-11-14 NOTE — OP NOTE
lUMBAR Medial Branch Block Under Fluoroscopy  Time-out taken to identify patient and procedure side prior to starting the procedure.   I attest that I have reviewed the patient's home medications prior to the procedure and no contraindication have been identified. I  re-evaluated the patient after the patient was positioned for the procedure in the procedure room immediately before the procedural time-out. The vital signs are current and represent the current state of the patient which has not significantly changed since the preprocedure assessment.            Date of Service: 11/14/2018    PCP: Jono Willoughby MD    Referring Physician:                                                           PROCEDURE:  Bilateral  L2, L3, L4 and L5 medial branch block    REASON FOR PROCEDURE: Lumbar spondylosis [M47.816]    PHYSICIAN: Eze Byrd MD  ASSISTANTS: none    MEDICATIONS INJECTED: Bupivicaine 0.25% 1.5ml at each level      LOCAL ANESTHETIC USED: Xylocaine 2% 3ml each site.    SEDATION MEDICATIONS: None    ESTIMATED BLOOD LOSS:  None.    COMPLICATIONS:  None.    TECHNIQUE: Laying in a prone position, the patient was prepped and draped in the usual sterile fashion using ChloraPrep and fenestrated drape.  The level was determined under fluoroscopic guidance.  Local anesthetic was given by going down to the hub of the 27-gauge 1.25in needle and raising a wheel.  A 22-gauge 3.5inch needle was introduced to the anatomic local of the medial branch at the lateral mass utilizing live fluoroscopy. Medication was then injected slowly. The patient tolerated the procedure well.       The patient was monitored after the procedure.  Patient was given post procedure and discharge instructions to follow at home.  We will see the patient back in two weeks or the patient may call to inform of status. The patient was discharged in a stable condition

## 2018-11-14 NOTE — DISCHARGE INSTRUCTIONS
1. DIET:   You may resume your normal diet today.   2. BATHING:   You may shower with luke warm water.  3. DRESSING:   You may remove your bandage today.   4. ACTIVITY LEVEL:   You may resume your normal activities today. This procedure was a test. Do all of the normal activities that you would do, even if it causes you pain.     Keep a diary of your pain score.     5. MEDICATIONS:   You may resume your normal medications today.     6. SPECIAL INSTRUCTIONS:   No heat to the injection site for 24 hrs including, bath or shower, heating pad, moist heat, or hot tubs.    Use ice pack to injection site for any pain or discomfort.  Apply ice packs for 20 minute intervals as needed.   If you have received any sedatives by mouth today you may not drive for 12 hours.    If you have received any sedation through your IV, you may not drive for 24 hrs.     PLEASE CALL YOUR DOCTOR IF:  1. Redness or swelling around the injection site.  2. Fever of 101 degrees  3. Drainage (pus) from the injection site.  4. For any continuous bleeding (some dried blood over the incision is normal.)    FOR EMERGENCIES:   If any unusual problems or difficulties occur during clinic hours, call [phone] or [phone].     Fall Prevention  Millions of people fall every year and injure themselves. You may have had anesthesia or sedation which may increase your risk of falling. You may have health issues that put you at an increased risk of falling.     Here are ways to reduce your risk of falling.  ·   · Make your home safe by keeping walkways clear of objects you may trip over.  · Use non-slip pads under rugs. Do not use area rugs or small throw rugs.  · Use non-slip mats in bathtubs and showers.  · Install handrails and lights on staircases.  · Do not walk in poorly lit areas.  · Do not stand on chairs or wobbly ladders.  · Use caution when reaching overhead or looking upward. This position can cause a loss of balance.  · Be sure your shoes fit  properly, have non-slip bottoms and are in good condition.   · Wear shoes both inside and out. Avoid going barefoot or wearing slippers.  · Be cautious when going up and down stairs, curbs, and when walking on uneven sidewalks.  · If your balance is poor, consider using a cane or walker.  · If your fall was related to alcohol use, stop or limit alcohol intake.   · If your fall was related to use of sleeping medicines, talk to your doctor about this. You may need to reduce your dosage at bedtime if you awaken during the night to go to the bathroom.    · To reduce the need for nighttime bathroom trips:  ¨ Avoid drinking fluids for several hours before going to bed  ¨ Empty your bladder before going to bed  ¨ Men can keep a urinal at the bedside  · Stay as active as you can. Balance, flexibility, strength, and endurance all come from exercise. They all play a role in preventing falls. Ask your healthcare provider which types of activity are right for you.  · Get your vision checked on a regular basis.  · If you have pets, know where they are before you stand up or walk so you don't trip over them.  · Use night lights.

## 2018-11-15 ENCOUNTER — TELEPHONE (OUTPATIENT)
Dept: PAIN MEDICINE | Facility: CLINIC | Age: 68
End: 2018-11-15

## 2018-11-16 ENCOUNTER — TELEPHONE (OUTPATIENT)
Dept: PAIN MEDICINE | Facility: CLINIC | Age: 68
End: 2018-11-16

## 2018-11-16 NOTE — TELEPHONE ENCOUNTER
Patient having difficulty explaining pain relief experienced following MBB.  Clinic appointment scheduled to discuss with Dr. Byrd.

## 2018-11-19 ENCOUNTER — OFFICE VISIT (OUTPATIENT)
Dept: PAIN MEDICINE | Facility: CLINIC | Age: 68
End: 2018-11-19
Payer: MEDICARE

## 2018-11-19 VITALS
HEART RATE: 74 BPM | HEIGHT: 67 IN | DIASTOLIC BLOOD PRESSURE: 76 MMHG | SYSTOLIC BLOOD PRESSURE: 134 MMHG | WEIGHT: 197.38 LBS | BODY MASS INDEX: 30.98 KG/M2 | OXYGEN SATURATION: 98 % | RESPIRATION RATE: 18 BRPM

## 2018-11-19 DIAGNOSIS — M53.3 SACROILIAC JOINT PAIN: ICD-10-CM

## 2018-11-19 DIAGNOSIS — M46.1 BILATERAL SACROILIITIS: Primary | ICD-10-CM

## 2018-11-19 DIAGNOSIS — M47.897 OTHER OSTEOARTHRITIS OF SPINE, LUMBOSACRAL REGION: ICD-10-CM

## 2018-11-19 DIAGNOSIS — M47.816 LUMBAR SPONDYLOSIS: ICD-10-CM

## 2018-11-19 DIAGNOSIS — M51.36 DDD (DEGENERATIVE DISC DISEASE), LUMBAR: ICD-10-CM

## 2018-11-19 PROCEDURE — 3078F DIAST BP <80 MM HG: CPT | Mod: CPTII,HCNC,S$GLB, | Performed by: PAIN MEDICINE

## 2018-11-19 PROCEDURE — 1101F PT FALLS ASSESS-DOCD LE1/YR: CPT | Mod: CPTII,HCNC,S$GLB, | Performed by: PAIN MEDICINE

## 2018-11-19 PROCEDURE — 3075F SYST BP GE 130 - 139MM HG: CPT | Mod: CPTII,HCNC,S$GLB, | Performed by: PAIN MEDICINE

## 2018-11-19 PROCEDURE — 99213 OFFICE O/P EST LOW 20 MIN: CPT | Mod: HCNC,S$GLB,, | Performed by: PAIN MEDICINE

## 2018-11-19 PROCEDURE — 99999 PR PBB SHADOW E&M-EST. PATIENT-LVL III: CPT | Mod: PBBFAC,HCNC,, | Performed by: PAIN MEDICINE

## 2018-11-19 NOTE — PROGRESS NOTES
Subjective:     Patient ID: Driss Hernnadez Jr. is a 68 y.o. male    Chief Complaint: Follow-up (MBB - unsure of efficacy)      Referred by: No ref. provider found      HPI:    Interval History (11/19/18):  He returns today for follow up.  He reports that bilateral lumbar medial branch blocks were not helpful. Pain is unchanged in quality and location since last visit. He denies any new symptoms.       Interval History (10/16/18):  He returns today for follow up.  He reports that he has continued to have bilateral low back pain. He still has some right lower extremity pain, but this is not as bothersome as his low back pain. The pain is located across the lower lumbar region R>L. It does not radiate. It is not associated with any numbness, tingling, weakness or b/b dysfunction. The pain is worse with activity. He also requests refills of tizanidine.      Interval History (12/18/17):  He returns today for follow up.  He reports that right L4 TFESI has been helpful for the right lumbar radicular pain. He reports 100% relief. He still has some lower back pain, but would prefer to discuss this later. Otherwise he is doing well.       Interval History (11/13/17):  He returns today for follow up.  He reports that PT has been helpful for the low back pain. He still has significant right foot and ankle pain when sitting in a reclined postion. This is the most painful area. He also has low back pain that is constant but does not bother him as much. Otherwise he is doing well.       Driss Hernandez Jr. is a 68 y.o. male who presents today with chronic bilateral low back pain R>>>L. Pain started over 40 years ago. No inciting injury or event noted. Pain is located mainly on the right side of the lower lumbar paraspinals. About 5 weeks ago, patient began to have right lwoer extremity pain that he felt was related to his back pain, but this has subsided. He denies any numbness, tingling, weakness, or b/b dysfunction. The  pain is described as dull, but can become sharp at times. Patient states that his pain is worse in the morning. He does not sleep well. States that he wakes up every 45 minutes. Has taken Tizanidine PRN in the pat, but cannot recall how it affected him. Probably has not taken in over a year. Cannot take NSAIDs due to decreased renal function. Has taken in the past with mild relief.  This pain is described in detail below.    Physical Therapy: Has completed course of PT and continues HEP    Non-pharmacologic Treatment: Nothing really helps         · TENS? No    Pain Medications:         · Currently taking: Gabapentin 500mg per day, Tizandine 16mg QHS    · Has tried in the past:  NSAIDs, Tylenol,     · Has not tried: Opioids, Tylenol, TCAs, SNRIs, topical creams    Blood thinners: ASA 325mg daily    Interventional Therapies:   11/29/17 - Right L4 TFESI - 100% relief  11/14/18 - Bilateral L2, L3, L4 and L5 MBBs - No relief noted    Relevant Surgeries: Previous right L4 hemilaminectomy    Affecting sleep? Yes    Affecting daily activities? yes    Depressive symptoms? no          · SI/HI? No    Work status: Unemployed    Pain Scores:    Best:       0/10  Worst:     10/10  Usually:   6/10  Today:    5/10    Review of Systems   Constitutional: Negative for activity change, appetite change, chills, fatigue, fever and unexpected weight change.   HENT: Negative for hearing loss.    Eyes: Negative for visual disturbance.   Respiratory: Negative for chest tightness and shortness of breath.    Cardiovascular: Negative for chest pain.   Gastrointestinal: Negative for abdominal pain, constipation, diarrhea, nausea and vomiting.   Genitourinary: Negative for difficulty urinating.   Musculoskeletal: Positive for back pain. Negative for gait problem and neck pain.   Skin: Negative for rash.   Neurological: Negative for dizziness, weakness, light-headedness, numbness and headaches.   Psychiatric/Behavioral: Positive for sleep  disturbance. Negative for hallucinations and suicidal ideas. The patient is not nervous/anxious.        Past Medical History:   Diagnosis Date    Chronic midline low back pain without sciatica 10/2/2017    Diabetes mellitus with neurological manifestations, uncontrolled 1/24/2017    Diabetic polyneuropathy associated with type 2 diabetes mellitus 1/24/2017    Diabetic polyneuropathy associated with type 2 diabetes mellitus     Essential hypertension 1/24/2017    Gastroesophageal reflux disease 1/24/2017    Hyperlipidemia 1/24/2017    Insomnia 1/24/2017    Long-term insulin use 1/24/2017    Lumbar spondylosis 11/13/2017    Nuclear sclerosis of both eyes 8/24/2017    Obesity     Uncontrolled type 2 diabetes mellitus without complication, with long-term current use of insulin 1/24/2017       Past Surgical History:   Procedure Laterality Date    BACK SURGERY      2002    CATARACT EXTRACTION W/  INTRAOCULAR LENS IMPLANT Right 08/29/2017    Dr. Hong    COLONOSCOPY N/A 2/21/2017    Procedure: COLONOSCOPY;  Surgeon: Robb Rosado MD;  Location: [facility];  Service: Endoscopy;  Laterality: N/A;    COLONOSCOPY N/A 2/21/2017    Performed by Robb [facility]ado [facility] at [facility] ENDO    CORONARY ARTERY BYPASS GRAFT      March 2016    EPIDURAL STEROID INJECTION Bilateral 11/14/2018    Procedure: Lumbar Medial Branch Blocks;  Surgeon: Eze Byrd Jr., MD;  Location: [facility] ENDO;  Service: Pain Management;  Laterality: Bilateral;  Bilateral Lumbar Medial Branch Blocks L2, L3, L4, L5    [id number]  [id number]    Arrive @ 1150; NO Sedation    INJECTION-STEROID-EPIDURAL-TRANSFORAMINAL Right 11/29/2017    Performed by Eze Byrd Jr., MD at [facility] OR    INSERTION-INTRAOCULAR LENS (IOL) Right 8/29/2017    Performed by Nickolas Hong MD at [facility] OR 1ST FLR    Lumbar Medial Branch Blocks Bilateral 11/14/2018    Performed by Eze Byrd Jr., MD at [facility] ENDO     "PHACOEMULSIFICATION-ASPIRATION-CATARACT Right 8/29/2017    Performed by Nickolas Hong [facility] at [facility] OR [address] [place] [address]    TONSILLECTOMY         Social History     Socioeconomic History    Marital status:      Spouse name: Not on file    Number of children: Not on file    Years of education: Not on file    Highest education level: Not on file   Social Needs    Financial resource strain: Not on file    Food insecurity - worry: Not on file    Food insecurity - inability: Not on file    Transportation needs - medical: Not on file    Transportation needs - non-medical: Not on file   Occupational History    Not on file   Tobacco Use    Smoking status: Never Smoker    Smokeless tobacco: Never Used   Substance and Sexual Activity    Alcohol use: No    Drug use: No    Sexual activity: Yes     Partners: Female   Other Topics Concern    Not on file   Social History Narrative    Not on file       Review of patient's allergies indicates:   Allergen Reactions    Penicillins Other (See Comments)     Unknown reaction, Had a reaction as a child    Shrimp Itching     Hand itching       Current Outpatient Medications on File Prior to Visit   Medication Sig Dispense Refill    ACCU-CHEK MOIZ CONTROL SOLN Soln       ACCU-CHEK MOIZ PLUS TEST STRP Strp tid 69777 strip 12    ACCU-CHEK SOFTCLIX LANCETS Misc       aspirin 325 MG tablet Take 325 mg by mouth every morning.       atorvastatin (LIPITOR) 40 MG tablet Take 1 tablet (40 mg total) by mouth every morning. 90 tablet 3    BD ALCOHOL SWABS PadM       BD INSULIN PEN NEEDLE UF MINI 31 gauge x 3/16" Ndle       BD ULTRA-FINE ROLAND PEN NEEDLES 32 gauge x 5/32" Ndle       fenofibrate 160 MG Tab TAKE 1 TABLET EVERY MORNING 90 tablet 4    gabapentin (NEURONTIN) 100 MG capsule Takes two 100 mg capsules in the morning and three 100 mg capsules with dinner 150 capsule 3    lisinopril (PRINIVIL,ZESTRIL) 40 MG tablet Take 1 tablet (40 mg total) by mouth daily with " "dinner or evening meal. 90 tablet 90    metFORMIN (GLUCOPHAGE-XR) 500 MG 24 hr tablet Take 2 tablets (1,000 mg total) by mouth 2 (two) times daily with meals. Takes two 500 mg twice a day 120 tablet 3    metoprolol succinate (TOPROL-XL) 50 MG 24 hr tablet Take 1 tablet (50 mg total) by mouth daily with dinner or evening meal. 90 tablet 4    nateglinide (STARLIX) 120 MG tablet       omeprazole (PRILOSEC) 20 MG capsule Take 1 capsule (20 mg total) by mouth daily with dinner or evening meal. 90 capsule 3    sildenafil (REVATIO) 20 mg Tab 1-5 pills at a time per day prn 30 tablet 11    tiZANidine (ZANAFLEX) 4 MG tablet Take 4 tablets (16 mg total) by mouth every evening. 360 tablet 3    triazolam (HALCION) 0.25 MG Tab TK 1 T PO QHS, prn  5    LANTUS SOLOSTAR U-100 INSULIN 100 unit/mL (3 mL) InPn pen INJECT 13 UNITS INTO THE SKIN EVERY MORNING. (Patient taking differently: Inject 15 Units into the skin every morning. ) 15 mL 2     No current facility-administered medications on file prior to visit.        Objective:      /76   Pulse 74   Resp 18   Ht 5' 7" (1.702 m)   Wt 89.5 kg (197 lb 6.4 oz)   SpO2 98%   BMI 30.92 kg/m²     Exam:  GEN:  Well developed, well nourished.  No acute distress. Normal pain behavior.  HEENT:  No trauma.  Mucous membranes moist.  Nares patent bilaterally.  PSYCH: Normal affect. Thought content appropriate.  CHEST:  Breathing symmetric.  No audible wheezing.  ABD: Soft, non-distended.  SKIN:  Warm, pink, dry.  No rash on exposed areas.    EXT:  No cyanosis, clubbing, or edema.  No color change or changes in nail or hair growth.  NEURO/MUSCULOSKELETAL:  Fully alert, oriented, and appropriate. Speech normal ivania. No cranial nerve deficits.   Gait: Normal.  No trendelenburg sign bilaterally.   Motor Strength: 5/5 motor strength throughout lower extremities.   Sensory: No sensory deficit in the lower extremities.   Reflexes:  2+ and symmetric throughout.  Downgoing Babinski's " bilaterally.  No clonus or spasticity.  L-Spine:  Full ROM with pain on extension. Positive facet loading bilaterally.  Negative SLR bilaterally.    SI Joint/Hip: Positive BENJAMIN on the left.  Negative FADIR bilaterally.   TTP over lumbar paraspinals.          Imaging:  Narrative     MRI lumbar spine without contrast.    Comparison: None.    Technique: Sagittal T1, T2, stir and axial T2 and T1-weighted images were obtained.  No IV contrast was utilized.    Results: The alignment of the lumbar spine is normal.  The vertebral body heights are well-maintained.  There is mild disc desiccation at all level.  Moderate disc space narrowing at L4-L5.  No evidence of malignant bone marrow replacement process or infection.  The conus medullaris terminates in good position.    T12-L1: Mild diffuse disc bulge, no central canal or foraminal stenosis.  The facet joints appear normal.    L1-L2: Mild diffuse disc bulge, mild facet joint degenerative change, no central canal or foraminal stenosis.    L2-L3: Mild diffuse disc bulge, moderate facet joint osseous hypertrophy.  No central canal or significant foramina narrowing.    L3-L4: Mild diffuse disc bulge, moderate facet joint osseous hypertrophy.  There is no central canal stenosis.  There is no greater than mild foraminal narrowing.    L4-L5: Laminectomy defect on the right.  There is a diffuse disc bulge and what appears to be a right paracentral disc extrusion which could abut the descending and exiting nerve roots , there is good decompression of the canal posteriorly.  There is moderate to severe bilateral foraminal narrowing.    L5-S1: Diffuse disc bulge and moderate to severe facet joint osseous hypertrophy, no central canal stenosis.  There is severe bilateral foramina narrowing.    The paraspinal soft tissues appear normal.   Impression         Postoperative changes of right laminectomy at L4-L5.  There is a diffuse disc bulge with a right paracentral/foraminal disc  extrusion likely to abut on the descending and right exiting nerve root.  There is moderate to severe bilateral foraminal narrowing at this level.  There is severe bilateral foraminal also seen at L5-S1 due to facet joint osseous hypertrophy.          Electronically signed by: ISAI FERNANDEZ MD  Date: 08/23/17  Time: 08:40          Assessment:       Encounter Diagnoses   Name Primary?    Bilateral sacroiliitis Yes    Other osteoarthritis of spine, lumbosacral region     Sacroiliac joint pain     DDD (degenerative disc disease), lumbar     Lumbar spondylosis          Plan:       Driss was seen today for follow-up.    Diagnoses and all orders for this visit:    Bilateral sacroiliitis  -     Case request GI: Injection, Steroid, Epidural    Other osteoarthritis of spine, lumbosacral region    Sacroiliac joint pain    DDD (degenerative disc disease), lumbar    Lumbar spondylosis        Driss Hernandez  is a 68 y.o. male with improved chronic radicular pain in the right L4 and L5 distribution. Today c/o low back pain R>L. Appears to facet mediated > SIJ but no relief with lumbar MBBs.    1. Schedule for bilateral SIJ injections under fluoroscopy.  2. RTC 2 weeks after procedure.

## 2018-11-19 NOTE — PROGRESS NOTES
Mr. Nqavi will be scheduled for Bilateral SI Joint Steroid Injections on 11/28/2018.  Reviewed the pre-procedure instructions listed below with him- copy also provided.  Instructed patient to notify clinic if he begins feeling ill, runs a fever, is prescribed antibiotics, and/or has any outpatient procedures within the two weeks leading up to this procedure.  Instructed patient to report to [facility], 2nd Floor Endoscopy Registration Desk.  All questions answered- patient verbalized understanding.  ASA does not need to be held per MD.    Day of Procedure  - Ensure you have obtained an arrival time from the Pain Management Staff  o Procedure Area will call 1-3 days in advance with your arrival time.  Please check any voicemails received.  o If you arrive past your scheduled procedure time, you may be asked to reschedule your procedure.  - Ensure you have a  with you to remain present throughout your procedure for your safety.  o If you arrive without a responsible adult to stay with you and drive you home, you may be asked to reschedule your procedure.  - Take all of your prescribed medications (exceptions noted below) with a small amount of water  - This is NOT a fasting procedure, you may have a light meal before coming.  - Wear comfortable clothing (loose fitting pants).  - You may wear glasses, dentures, contact lenses, and/or hearing aids. Please remove all jewelry and metal hairpins.  - Notify the nurse during the intake process if you are allergic to any medications, if you are diabetic, or if you are not feeling well  - Contact the Pain Management Clinic with the following:  o A fever greater than 100° (degrees)   o Feel ill, have any type of infection, or are taking antibiotics now or within the two (2) weeks leading up to this procedure  o Have any outpatient procedures within the two (2) weeks leading up to this procedure (colonoscopy, major dental work, etc.)    IF you are taking  blood thinners: Only upon receiving clearance and notification from the Pain Management Department  7 Days Prior to Your Procedure:  - Stop taking Plavix/Clopridogrel, Effient/Prasugel   5 Days Prior to Your Procedure:  - Stop taking Coumadin/Warfarin.  An INR may be drawn prior to your procedure.  If labs are required, you will need to arrive earlier than your scheduled arrival time.  - Stop taking Pradaxa/Dabigatra,  Brilinta/ Ticagrelor  3 Days Prior to Your Procedure:  - Stop taking Xarelto/Rivaroxaban, Eliquis/Apixaban, Aggrenox/Dipyridamole, Reopro/Abciximab

## 2018-11-19 NOTE — H&P (VIEW-ONLY)
Subjective:     Patient ID: Driss Hernandez Jr. is a 68 y.o. male    Chief Complaint: Follow-up (MBB - unsure of efficacy)      Referred by: No ref. provider found      HPI:    Interval History (11/19/18):  He returns today for follow up.  He reports that bilateral lumbar medial branch blocks were not helpful. Pain is unchanged in quality and location since last visit. He denies any new symptoms.       Interval History (10/16/18):  He returns today for follow up.  He reports that he has continued to have bilateral low back pain. He still has some right lower extremity pain, but this is not as bothersome as his low back pain. The pain is located across the lower lumbar region R>L. It does not radiate. It is not associated with any numbness, tingling, weakness or b/b dysfunction. The pain is worse with activity. He also requests refills of tizanidine.      Interval History (12/18/17):  He returns today for follow up.  He reports that right L4 TFESI has been helpful for the right lumbar radicular pain. He reports 100% relief. He still has some lower back pain, but would prefer to discuss this later. Otherwise he is doing well.       Interval History (11/13/17):  He returns today for follow up.  He reports that PT has been helpful for the low back pain. He still has significant right foot and ankle pain when sitting in a reclined postion. This is the most painful area. He also has low back pain that is constant but does not bother him as much. Otherwise he is doing well.       Driss Hernandez Jr. is a 68 y.o. male who presents today with chronic bilateral low back pain R>>>L. Pain started over 40 years ago. No inciting injury or event noted. Pain is located mainly on the right side of the lower lumbar paraspinals. About 5 weeks ago, patient began to have right lwoer extremity pain that he felt was related to his back pain, but this has subsided. He denies any numbness, tingling, weakness, or b/b dysfunction. The  pain is described as dull, but can become sharp at times. Patient states that his pain is worse in the morning. He does not sleep well. States that he wakes up every 45 minutes. Has taken Tizanidine PRN in the pat, but cannot recall how it affected him. Probably has not taken in over a year. Cannot take NSAIDs due to decreased renal function. Has taken in the past with mild relief.  This pain is described in detail below.    Physical Therapy: Has completed course of PT and continues HEP    Non-pharmacologic Treatment: Nothing really helps         · TENS? No    Pain Medications:         · Currently taking: Gabapentin 500mg per day, Tizandine 16mg QHS    · Has tried in the past:  NSAIDs, Tylenol,     · Has not tried: Opioids, Tylenol, TCAs, SNRIs, topical creams    Blood thinners: ASA 325mg daily    Interventional Therapies:   11/29/17 - Right L4 TFESI - 100% relief  11/14/18 - Bilateral L2, L3, L4 and L5 MBBs - No relief noted    Relevant Surgeries: Previous right L4 hemilaminectomy    Affecting sleep? Yes    Affecting daily activities? yes    Depressive symptoms? no          · SI/HI? No    Work status: Unemployed    Pain Scores:    Best:       0/10  Worst:     10/10  Usually:   6/10  Today:    5/10    Review of Systems   Constitutional: Negative for activity change, appetite change, chills, fatigue, fever and unexpected weight change.   HENT: Negative for hearing loss.    Eyes: Negative for visual disturbance.   Respiratory: Negative for chest tightness and shortness of breath.    Cardiovascular: Negative for chest pain.   Gastrointestinal: Negative for abdominal pain, constipation, diarrhea, nausea and vomiting.   Genitourinary: Negative for difficulty urinating.   Musculoskeletal: Positive for back pain. Negative for gait problem and neck pain.   Skin: Negative for rash.   Neurological: Negative for dizziness, weakness, light-headedness, numbness and headaches.   Psychiatric/Behavioral: Positive for sleep  disturbance. Negative for hallucinations and suicidal ideas. The patient is not nervous/anxious.        Past Medical History:   Diagnosis Date    Chronic midline low back pain without sciatica 10/2/2017    Diabetes mellitus with neurological manifestations, uncontrolled 1/24/2017    Diabetic polyneuropathy associated with type 2 diabetes mellitus 1/24/2017    Diabetic polyneuropathy associated with type 2 diabetes mellitus     Essential hypertension 1/24/2017    Gastroesophageal reflux disease 1/24/2017    Hyperlipidemia 1/24/2017    Insomnia 1/24/2017    Long-term insulin use 1/24/2017    Lumbar spondylosis 11/13/2017    Nuclear sclerosis of both eyes 8/24/2017    Obesity     Uncontrolled type 2 diabetes mellitus without complication, with long-term current use of insulin 1/24/2017       Past Surgical History:   Procedure Laterality Date    BACK SURGERY      2002    CATARACT EXTRACTION W/  INTRAOCULAR LENS IMPLANT Right 08/29/2017    Dr. Hong    COLONOSCOPY N/A 2/21/2017    Procedure: COLONOSCOPY;  Surgeon: Robb Rosado MD;  Location: [facility];  Service: Endoscopy;  Laterality: N/A;    COLONOSCOPY N/A 2/21/2017    Performed by Robb [facility]ado MD at [facility] ENDO    CORONARY ARTERY BYPASS GRAFT      March 2016    EPIDURAL STEROID INJECTION Bilateral 11/14/2018    Procedure: Lumbar Medial Branch Blocks;  Surgeon: Eze Byrd Jr., MD;  Location: [facility] ENDO;  Service: Pain Management;  Laterality: Bilateral;  Bilateral Lumbar Medial Branch Blocks L2, L3, L4, L5    [id number]  [id number]    Arrive @ 1150; NO Sedation    INJECTION-STEROID-EPIDURAL-TRANSFORAMINAL Right 11/29/2017    Performed by Eze Byrd Jr., MD at [facility] OR    INSERTION-INTRAOCULAR LENS (IOL) Right 8/29/2017    Performed by Nickolas Hong [facility] at [facility] OR 1ST FLR    Lumbar Medial Branch Blocks Bilateral 11/14/2018    Performed by Eze Byrd Jr., MD at [facility] ENDO     "PHACOEMULSIFICATION-ASPIRATION-CATARACT Right 8/29/2017    Performed by Nickolas Hong MD at [facility] OR [address] [place] [address]    TONSILLECTOMY         Social History     Socioeconomic History    Marital status:      Spouse name: Not on file    Number of children: Not on file    Years of education: Not on file    Highest education level: Not on file   Social Needs    Financial resource strain: Not on file    Food insecurity - worry: Not on file    Food insecurity - inability: Not on file    Transportation needs - medical: Not on file    Transportation needs - non-medical: Not on file   Occupational History    Not on file   Tobacco Use    Smoking status: Never Smoker    Smokeless tobacco: Never Used   Substance and Sexual Activity    Alcohol use: No    Drug use: No    Sexual activity: Yes     Partners: Female   Other Topics Concern    Not on file   Social History Narrative    Not on file       Review of patient's allergies indicates:   Allergen Reactions    Penicillins Other (See Comments)     Unknown reaction, Had a reaction as a child    Shrimp Itching     Hand itching       Current Outpatient Medications on File Prior to Visit   Medication Sig Dispense Refill    ACCU-CHEK MOIZ CONTROL SOLN Soln       ACCU-CHEK MOIZ PLUS TEST STRP Strp tid 32448 strip 12    ACCU-CHEK SOFTCLIX LANCETS Misc       aspirin 325 MG tablet Take 325 mg by mouth every morning.       atorvastatin (LIPITOR) 40 MG tablet Take 1 tablet (40 mg total) by mouth every morning. 90 tablet 3    BD ALCOHOL SWABS PadM       BD INSULIN PEN NEEDLE UF MINI 31 gauge x 3/16" Ndle       BD ULTRA-FINE ROLAND PEN NEEDLES 32 gauge x 5/32" Ndle       fenofibrate 160 MG Tab TAKE 1 TABLET EVERY MORNING 90 tablet 4    gabapentin (NEURONTIN) 100 MG capsule Takes two 100 mg capsules in the morning and three 100 mg capsules with dinner 150 capsule 3    lisinopril (PRINIVIL,ZESTRIL) 40 MG tablet Take 1 tablet (40 mg total) by mouth daily with " "dinner or evening meal. 90 tablet 90    metFORMIN (GLUCOPHAGE-XR) 500 MG 24 hr tablet Take 2 tablets (1,000 mg total) by mouth 2 (two) times daily with meals. Takes two 500 mg twice a day 120 tablet 3    metoprolol succinate (TOPROL-XL) 50 MG 24 hr tablet Take 1 tablet (50 mg total) by mouth daily with dinner or evening meal. 90 tablet 4    nateglinide (STARLIX) 120 MG tablet       omeprazole (PRILOSEC) 20 MG capsule Take 1 capsule (20 mg total) by mouth daily with dinner or evening meal. 90 capsule 3    sildenafil (REVATIO) 20 mg Tab 1-5 pills at a time per day prn 30 tablet 11    tiZANidine (ZANAFLEX) 4 MG tablet Take 4 tablets (16 mg total) by mouth every evening. 360 tablet 3    triazolam (HALCION) 0.25 MG Tab TK 1 T PO QHS, prn  5    LANTUS SOLOSTAR U-100 INSULIN 100 unit/mL (3 mL) InPn pen INJECT 13 UNITS INTO THE SKIN EVERY MORNING. (Patient taking differently: Inject 15 Units into the skin every morning. ) 15 mL 2     No current facility-administered medications on file prior to visit.        Objective:      /76   Pulse 74   Resp 18   Ht 5' 7" (1.702 m)   Wt 89.5 kg (197 lb 6.4 oz)   SpO2 98%   BMI 30.92 kg/m²     Exam:  GEN:  Well developed, well nourished.  No acute distress. Normal pain behavior.  HEENT:  No trauma.  Mucous membranes moist.  Nares patent bilaterally.  PSYCH: Normal affect. Thought content appropriate.  CHEST:  Breathing symmetric.  No audible wheezing.  ABD: Soft, non-distended.  SKIN:  Warm, pink, dry.  No rash on exposed areas.    EXT:  No cyanosis, clubbing, or edema.  No color change or changes in nail or hair growth.  NEURO/MUSCULOSKELETAL:  Fully alert, oriented, and appropriate. Speech normal ivania. No cranial nerve deficits.   Gait: Normal.  No trendelenburg sign bilaterally.   Motor Strength: 5/5 motor strength throughout lower extremities.   Sensory: No sensory deficit in the lower extremities.   Reflexes:  2+ and symmetric throughout.  Downgoing Babinski's " bilaterally.  No clonus or spasticity.  L-Spine:  Full ROM with pain on extension. Positive facet loading bilaterally.  Negative SLR bilaterally.    SI Joint/Hip: Positive BENJAMIN on the left.  Negative FADIR bilaterally.   TTP over lumbar paraspinals.          Imaging:  Narrative     MRI lumbar spine without contrast.    Comparison: None.    Technique: Sagittal T1, T2, stir and axial T2 and T1-weighted images were obtained.  No IV contrast was utilized.    Results: The alignment of the lumbar spine is normal.  The vertebral body heights are well-maintained.  There is mild disc desiccation at all level.  Moderate disc space narrowing at L4-L5.  No evidence of malignant bone marrow replacement process or infection.  The conus medullaris terminates in good position.    T12-L1: Mild diffuse disc bulge, no central canal or foraminal stenosis.  The facet joints appear normal.    L1-L2: Mild diffuse disc bulge, mild facet joint degenerative change, no central canal or foraminal stenosis.    L2-L3: Mild diffuse disc bulge, moderate facet joint osseous hypertrophy.  No central canal or significant foramina narrowing.    L3-L4: Mild diffuse disc bulge, moderate facet joint osseous hypertrophy.  There is no central canal stenosis.  There is no greater than mild foraminal narrowing.    L4-L5: Laminectomy defect on the right.  There is a diffuse disc bulge and what appears to be a right paracentral disc extrusion which could abut the descending and exiting nerve roots , there is good decompression of the canal posteriorly.  There is moderate to severe bilateral foraminal narrowing.    L5-S1: Diffuse disc bulge and moderate to severe facet joint osseous hypertrophy, no central canal stenosis.  There is severe bilateral foramina narrowing.    The paraspinal soft tissues appear normal.   Impression         Postoperative changes of right laminectomy at L4-L5.  There is a diffuse disc bulge with a right paracentral/foraminal disc  extrusion likely to abut on the descending and right exiting nerve root.  There is moderate to severe bilateral foraminal narrowing at this level.  There is severe bilateral foraminal also seen at L5-S1 due to facet joint osseous hypertrophy.          Electronically signed by: ISAI FERNANDEZ MD  Date: 08/23/17  Time: 08:40          Assessment:       Encounter Diagnoses   Name Primary?    Bilateral sacroiliitis Yes    Other osteoarthritis of spine, lumbosacral region     Sacroiliac joint pain     DDD (degenerative disc disease), lumbar     Lumbar spondylosis          Plan:       Driss was seen today for follow-up.    Diagnoses and all orders for this visit:    Bilateral sacroiliitis  -     Case request GI: Injection, Steroid, Epidural    Other osteoarthritis of spine, lumbosacral region    Sacroiliac joint pain    DDD (degenerative disc disease), lumbar    Lumbar spondylosis        Driss Hernandez  is a 68 y.o. male with improved chronic radicular pain in the right L4 and L5 distribution. Today c/o low back pain R>L. Appears to facet mediated > SIJ but no relief with lumbar MBBs.    1. Schedule for bilateral SIJ injections under fluoroscopy.  2. RTC 2 weeks after procedure.

## 2018-11-26 ENCOUNTER — LAB VISIT (OUTPATIENT)
Dept: LAB | Facility: HOSPITAL | Age: 68
End: 2018-11-26
Attending: INTERNAL MEDICINE
Payer: MEDICARE

## 2018-11-26 DIAGNOSIS — E78.5 HYPERLIPEMIA: ICD-10-CM

## 2018-11-26 LAB
ALBUMIN SERPL BCP-MCNC: 3.7 G/DL
ALP SERPL-CCNC: 26 U/L
ALT SERPL W/O P-5'-P-CCNC: 25 U/L
ANION GAP SERPL CALC-SCNC: 9 MMOL/L
AST SERPL-CCNC: 23 U/L
BASOPHILS # BLD AUTO: 0.03 K/UL
BASOPHILS NFR BLD: 0.6 %
BILIRUB SERPL-MCNC: 0.4 MG/DL
BUN SERPL-MCNC: 28 MG/DL
CALCIUM SERPL-MCNC: 9.8 MG/DL
CHLORIDE SERPL-SCNC: 107 MMOL/L
CO2 SERPL-SCNC: 23 MMOL/L
CREAT SERPL-MCNC: 1.3 MG/DL
DIFFERENTIAL METHOD: ABNORMAL
EOSINOPHIL # BLD AUTO: 0.1 K/UL
EOSINOPHIL NFR BLD: 1.9 %
ERYTHROCYTE [DISTWIDTH] IN BLOOD BY AUTOMATED COUNT: 14.9 %
EST. GFR  (AFRICAN AMERICAN): >60 ML/MIN/1.73 M^2
EST. GFR  (NON AFRICAN AMERICAN): 55.7 ML/MIN/1.73 M^2
ESTIMATED AVG GLUCOSE: 163 MG/DL
GLUCOSE SERPL-MCNC: 163 MG/DL
HBA1C MFR BLD HPLC: 7.3 %
HCT VFR BLD AUTO: 43 %
HGB BLD-MCNC: 13.2 G/DL
IMM GRANULOCYTES # BLD AUTO: 0.03 K/UL
IMM GRANULOCYTES NFR BLD AUTO: 0.6 %
LYMPHOCYTES # BLD AUTO: 2 K/UL
LYMPHOCYTES NFR BLD: 38.7 %
MCH RBC QN AUTO: 26.4 PG
MCHC RBC AUTO-ENTMCNC: 30.7 G/DL
MCV RBC AUTO: 86 FL
MONOCYTES # BLD AUTO: 0.5 K/UL
MONOCYTES NFR BLD: 8.7 %
NEUTROPHILS # BLD AUTO: 2.6 K/UL
NEUTROPHILS NFR BLD: 49.5 %
NRBC BLD-RTO: 0 /100 WBC
PLATELET # BLD AUTO: 204 K/UL
PMV BLD AUTO: 10.8 FL
POTASSIUM SERPL-SCNC: 5.5 MMOL/L
PROT SERPL-MCNC: 6.8 G/DL
RBC # BLD AUTO: 5 M/UL
SODIUM SERPL-SCNC: 139 MMOL/L
T4 FREE SERPL-MCNC: 0.91 NG/DL
TSH SERPL DL<=0.005 MIU/L-ACNC: 3.63 UIU/ML
WBC # BLD AUTO: 5.2 K/UL

## 2018-11-26 PROCEDURE — 85025 COMPLETE CBC W/AUTO DIFF WBC: CPT | Mod: HCNC

## 2018-11-26 PROCEDURE — 84443 ASSAY THYROID STIM HORMONE: CPT | Mod: HCNC

## 2018-11-26 PROCEDURE — 80053 COMPREHEN METABOLIC PANEL: CPT | Mod: HCNC

## 2018-11-26 PROCEDURE — 36415 COLL VENOUS BLD VENIPUNCTURE: CPT | Mod: HCNC,PO

## 2018-11-26 PROCEDURE — 83036 HEMOGLOBIN GLYCOSYLATED A1C: CPT | Mod: HCNC

## 2018-11-26 PROCEDURE — 84439 ASSAY OF FREE THYROXINE: CPT | Mod: HCNC

## 2018-11-28 ENCOUNTER — HOSPITAL ENCOUNTER (OUTPATIENT)
Dept: RADIOLOGY | Facility: HOSPITAL | Age: 68
Discharge: HOME | End: 2018-11-28
Attending: PAIN MEDICINE | Admitting: PAIN MEDICINE
Payer: MEDICARE

## 2018-11-28 ENCOUNTER — HOSPITAL ENCOUNTER (OUTPATIENT)
Facility: HOSPITAL | Age: 68
Discharge: HOME | End: 2018-11-28
Attending: PAIN MEDICINE | Admitting: PAIN MEDICINE
Payer: MEDICARE

## 2018-11-28 VITALS
RESPIRATION RATE: 20 BRPM | SYSTOLIC BLOOD PRESSURE: 128 MMHG | TEMPERATURE: 98 F | DIASTOLIC BLOOD PRESSURE: 70 MMHG | HEART RATE: 68 BPM | OXYGEN SATURATION: 96 %

## 2018-11-28 DIAGNOSIS — M46.1 BILATERAL SACROILIITIS: ICD-10-CM

## 2018-11-28 DIAGNOSIS — M47.897 OTHER OSTEOARTHRITIS OF SPINE, LUMBOSACRAL REGION: Primary | ICD-10-CM

## 2018-11-28 DIAGNOSIS — M47.817 LUMBOSACRAL SPONDYLOSIS: ICD-10-CM

## 2018-11-28 LAB — POCT GLUCOSE: 104 MG/DL (ref 70–110)

## 2018-11-28 PROCEDURE — 27096 INJECT SACROILIAC JOINT: CPT | Mod: 50,HCNC | Performed by: PAIN MEDICINE

## 2018-11-28 PROCEDURE — 76000 FLUOROSCOPY <1 HR PHYS/QHP: CPT | Mod: TC,HCNC

## 2018-11-28 PROCEDURE — 27096 INJECT SACROILIAC JOINT: CPT | Mod: 50,HCNC,, | Performed by: PAIN MEDICINE

## 2018-11-28 PROCEDURE — 25000003 PHARM REV CODE 250: Mod: HCNC | Performed by: PAIN MEDICINE

## 2018-11-28 PROCEDURE — 25500020 PHARM REV CODE 255: Mod: HCNC | Performed by: PAIN MEDICINE

## 2018-11-28 PROCEDURE — 63600175 PHARM REV CODE 636 W HCPCS: Mod: HCNC | Performed by: PAIN MEDICINE

## 2018-11-28 RX ORDER — LIDOCAINE HYDROCHLORIDE 20 MG/ML
INJECTION, SOLUTION INFILTRATION; PERINEURAL
Status: DISPENSED
Start: 2018-11-28 | End: 2018-11-28

## 2018-11-28 RX ORDER — TRIAMCINOLONE ACETONIDE 40 MG/ML
INJECTION, SUSPENSION INTRA-ARTICULAR; INTRAMUSCULAR
Status: DISPENSED
Start: 2018-11-28 | End: 2018-11-28

## 2018-11-28 RX ORDER — LIDOCAINE HYDROCHLORIDE 20 MG/ML
10 INJECTION, SOLUTION INFILTRATION; PERINEURAL ONCE
Status: COMPLETED | OUTPATIENT
Start: 2018-11-28 | End: 2018-11-28

## 2018-11-28 RX ORDER — BUPIVACAINE HYDROCHLORIDE 2.5 MG/ML
INJECTION, SOLUTION EPIDURAL; INFILTRATION; INTRACAUDAL
Status: DISPENSED
Start: 2018-11-28 | End: 2018-11-28

## 2018-11-28 RX ORDER — TRIAMCINOLONE ACETONIDE 40 MG/ML
40 INJECTION, SUSPENSION INTRA-ARTICULAR; INTRAMUSCULAR ONCE
Status: COMPLETED | OUTPATIENT
Start: 2018-11-28 | End: 2018-11-28

## 2018-11-28 RX ORDER — BUPIVACAINE HYDROCHLORIDE 2.5 MG/ML
10 INJECTION, SOLUTION EPIDURAL; INFILTRATION; INTRACAUDAL ONCE
Status: COMPLETED | OUTPATIENT
Start: 2018-11-28 | End: 2018-11-28

## 2018-11-28 RX ORDER — ALPRAZOLAM 0.5 MG/1
1 TABLET, ORALLY DISINTEGRATING ORAL ONCE AS NEEDED
Status: COMPLETED | OUTPATIENT
Start: 2018-11-28 | End: 2018-11-28

## 2018-11-28 RX ADMIN — ALPRAZOLAM 1 MG: 0.5 TABLET, ORALLY DISINTEGRATING ORAL at 10:11

## 2018-11-28 NOTE — OP NOTE
Sacroiliac Joint Injection under Fluoroscopy  Time-out taken to identify patient and procedure side prior to starting the procedure.   I attest that I have reviewed the patient's home medications prior to the procedure and no contraindication have been identified. I  re-evaluated the patient after the patient was positioned for the procedure in the procedure room immediately before the procedural time-out. The vital signs are current and represent the current state of the patient which has not significantly changed since the preprocedure assessment.               Date of Service: 11/28/2018    PCP: Jono Willoughby MD    Referring Physician:                                                   PROCEDURE:  bilateralsacroiliac joint injection under fluoroscopy.    REASON FOR PROCEDURE: bilateral sacroiliac joint Bilateral sacroiliitis [M46.1]    PHYSICIAN: Eze Byrd MD  ASSISTANTS: none    MEDICATIONS INJECTED:  Kenalog 20mg and Bupivacaine 0.25% (1.5ml of bupivicaine per side).    LOCAL ANESTHETIC USED: Xylocaine 2% 3ml per site.  SEDATION MEDICATIONS: None    ESTIMATED BLOOD LOSS:  None.    COMPLICATIONS:  None.    TECHNIQUE:   Laying in the prone position, the patient was prepped and draped in the usual sterile fashion using ChloraPrep and fenestrated drape.  The area was determined under fluoroscopy.  Local Xylocaine was injected by raising a wheel and going down to the periosteum using a 27-gauge hypodermic needle.  The 3.5 inch 22-gauge spinal needle was introduce into the bilateral sacroiliac joint.  Negative pressure applied to confirm no intravascular placement.  Omnipaque was injected to confirm placement and to confirm that there was no vascular runoff.  The medication was then injected slowly.  The patient tolerated the procedure well.    PAIN BEFORE THE PROCEDURE:  4/10.    PAIN AFTER THE PROCEDURE: 2/10.    The patient was monitored for approximately 30 minutes after the procedure.  Patient was  given post procedure and discharge instructions to follow at home.  We will see the patient back in two weeks or the patient may call to inform of status. The patient was discharged in a stable condition

## 2018-11-28 NOTE — DISCHARGE INSTRUCTIONS
Home Care Instructions Pain Management:    1. DIET:   You may resume your normal diet today.   2. BATHING:   You may shower with luke warm water.  3. DRESSING:   You may remove your bandage today.   4. ACTIVITY LEVEL:   You may resume your normal activities 24 hrs after your procedure.  5. MEDICATIONS:   You may resume your normal medications today.   6. SPECIAL INSTRUCTIONS:   No heat to the injection site for 24 hrs including, bath or shower, heating pad, moist heat, or hot tubs.    Use ice pack to injection site for any pain or discomfort.  Apply ice packs for 20 minute intervals as needed.   If you have received any sedatives by mouth today you may not drive for 12 hours.    If you have received any sedation through your IV, you may not drive for 24 hrs.     PLEASE CALL YOUR DOCTOR IF:  1. Redness or swelling around the injection site.  2. Fever of 101 degrees  3. Drainage (pus) from the injection site.  4. For any continuous bleeding (some dried blood over the incision is normal.)    FOR EMERGENCIES:   If any unusual problems or difficulties occur during clinic hours, call [phone] or [phone].       Fall Prevention  Millions of people fall every year and injure themselves. You may have had anesthesia or sedation which may increase your risk of falling. You may have health issues that put you at an increased risk of falling.     Here are ways to reduce your risk of falling.  ·   · Make your home safe by keeping walkways clear of objects you may trip over.  · Use non-slip pads under rugs. Do not use area rugs or small throw rugs.  · Use non-slip mats in bathtubs and showers.  · Install handrails and lights on staircases.  · Do not walk in poorly lit areas.  · Do not stand on chairs or wobbly ladders.  · Use caution when reaching overhead or looking upward. This position can cause a loss of balance.  · Be sure your shoes fit properly, have non-slip bottoms and are in good condition.   · Wear shoes both inside and  out. Avoid going barefoot or wearing slippers.  · Be cautious when going up and down stairs, curbs, and when walking on uneven sidewalks.  · If your balance is poor, consider using a cane or walker.  · If your fall was related to alcohol use, stop or limit alcohol intake.   · If your fall was related to use of sleeping medicines, talk to your doctor about this. You may need to reduce your dosage at bedtime if you awaken during the night to go to the bathroom.    · To reduce the need for nighttime bathroom trips:  ¨ Avoid drinking fluids for several hours before going to bed  ¨ Empty your bladder before going to bed  ¨ Men can keep a urinal at the bedside  · Stay as active as you can. Balance, flexibility, strength, and endurance all come from exercise. They all play a role in preventing falls. Ask your healthcare provider which types of activity are right for you.  · Get your vision checked on a regular basis.  · If you have pets, know where they are before you stand up or walk so you don't trip over them.  · Use night lights.

## 2018-12-05 RX ORDER — BLOOD SUGAR DIAGNOSTIC
STRIP MISCELLANEOUS
Qty: 300 STRIP | Refills: 12 | Status: SHIPPED | OUTPATIENT
Start: 2018-12-05 | End: 2020-01-23 | Stop reason: SDUPTHER

## 2018-12-05 RX ORDER — GABAPENTIN 100 MG/1
CAPSULE ORAL
Qty: 450 CAPSULE | Refills: 3 | Status: SHIPPED | OUTPATIENT
Start: 2018-12-05 | End: 2020-08-02 | Stop reason: SDUPTHER

## 2018-12-14 ENCOUNTER — TELEPHONE (OUTPATIENT)
Dept: PAIN MEDICINE | Facility: CLINIC | Age: 68
End: 2018-12-14

## 2018-12-14 NOTE — TELEPHONE ENCOUNTER
Reminded patient of Pain Management appointment scheduled for Monday at 8.15a with Dr. Byrd- verbal confirmation received.  Location information also provided.

## 2018-12-17 ENCOUNTER — OFFICE VISIT (OUTPATIENT)
Dept: PAIN MEDICINE | Facility: CLINIC | Age: 68
End: 2018-12-17
Payer: MEDICARE

## 2018-12-17 VITALS
SYSTOLIC BLOOD PRESSURE: 120 MMHG | HEIGHT: 67 IN | OXYGEN SATURATION: 99 % | RESPIRATION RATE: 18 BRPM | HEART RATE: 80 BPM | WEIGHT: 195.38 LBS | DIASTOLIC BLOOD PRESSURE: 61 MMHG | BODY MASS INDEX: 30.66 KG/M2

## 2018-12-17 DIAGNOSIS — M47.816 LUMBAR SPONDYLOSIS: ICD-10-CM

## 2018-12-17 DIAGNOSIS — M47.897 OTHER OSTEOARTHRITIS OF SPINE, LUMBOSACRAL REGION: ICD-10-CM

## 2018-12-17 DIAGNOSIS — M53.3 SACROILIAC JOINT PAIN: ICD-10-CM

## 2018-12-17 DIAGNOSIS — M51.36 DDD (DEGENERATIVE DISC DISEASE), LUMBAR: Primary | ICD-10-CM

## 2018-12-17 PROCEDURE — 3074F SYST BP LT 130 MM HG: CPT | Mod: CPTII,HCNC,S$GLB, | Performed by: PAIN MEDICINE

## 2018-12-17 PROCEDURE — 99214 OFFICE O/P EST MOD 30 MIN: CPT | Mod: HCNC,S$GLB,, | Performed by: PAIN MEDICINE

## 2018-12-17 PROCEDURE — 3078F DIAST BP <80 MM HG: CPT | Mod: CPTII,HCNC,S$GLB, | Performed by: PAIN MEDICINE

## 2018-12-17 PROCEDURE — 99999 PR PBB SHADOW E&M-EST. PATIENT-LVL III: CPT | Mod: PBBFAC,HCNC,, | Performed by: PAIN MEDICINE

## 2018-12-17 PROCEDURE — 1101F PT FALLS ASSESS-DOCD LE1/YR: CPT | Mod: CPTII,HCNC,S$GLB, | Performed by: PAIN MEDICINE

## 2018-12-17 NOTE — PROGRESS NOTES
Subjective:     Patient ID: Driss Hernandez Jr. is a 68 y.o. male    Chief Complaint: Follow-up (S/P Bilat SI Joints 10.28.18)      Referred by: No ref. provider found      HPI:    Interval History (12/17/18):  He returns today for follow up.  He reports that bilateral SIJ injections has been helpful for the low back pain. He reports about 80% relief. He does not feel that he needs any further interventions at this time      Interval History (11/19/18):  He returns today for follow up.  He reports that bilateral lumbar medial branch blocks were not helpful. Pain is unchanged in quality and location since last visit. He denies any new symptoms.       Interval History (10/16/18):  He returns today for follow up.  He reports that he has continued to have bilateral low back pain. He still has some right lower extremity pain, but this is not as bothersome as his low back pain. The pain is located across the lower lumbar region R>L. It does not radiate. It is not associated with any numbness, tingling, weakness or b/b dysfunction. The pain is worse with activity. He also requests refills of tizanidine.      Interval History (12/18/17):  He returns today for follow up.  He reports that right L4 TFESI has been helpful for the right lumbar radicular pain. He reports 100% relief. He still has some lower back pain, but would prefer to discuss this later. Otherwise he is doing well.       Interval History (11/13/17):  He returns today for follow up.  He reports that PT has been helpful for the low back pain. He still has significant right foot and ankle pain when sitting in a reclined postion. This is the most painful area. He also has low back pain that is constant but does not bother him as much. Otherwise he is doing well.       Driss Hernandez Jr. is a 68 y.o. male who presents today with chronic bilateral low back pain R>>>L. Pain started over 40 years ago. No inciting injury or event noted. Pain is located mainly  on the right side of the lower lumbar paraspinals. About 5 weeks ago, patient began to have right lwoer extremity pain that he felt was related to his back pain, but this has subsided. He denies any numbness, tingling, weakness, or b/b dysfunction. The pain is described as dull, but can become sharp at times. Patient states that his pain is worse in the morning. He does not sleep well. States that he wakes up every 45 minutes. Has taken Tizanidine PRN in the pat, but cannot recall how it affected him. Probably has not taken in over a year. Cannot take NSAIDs due to decreased renal function. Has taken in the past with mild relief.  This pain is described in detail below.    Physical Therapy: Has completed course of PT and continues HEP    Non-pharmacologic Treatment: Nothing really helps         · TENS? No    Pain Medications:         · Currently taking: Gabapentin 500mg per day, Tizandine 16mg QHS    · Has tried in the past:  NSAIDs, Tylenol,     · Has not tried: Opioids, Tylenol, TCAs, SNRIs, topical creams    Blood thinners: ASA 325mg daily    Interventional Therapies:   10/28/18 - bilateral SIJ injections -  80% relief  11/29/17 - Right L4 TFESI - 100% relief  11/14/18 - Bilateral L2, L3, L4 and L5 MBBs - No relief noted    Relevant Surgeries: Previous right L4 hemilaminectomy    Affecting sleep? Yes    Affecting daily activities? yes    Depressive symptoms? no          · SI/HI? No    Work status: Unemployed    Pain Scores:    Best:       0/10  Worst:     8/10  Usually:   3/10  Today:    3/10    Review of Systems   Constitutional: Negative for activity change, appetite change, chills, fatigue, fever and unexpected weight change.   HENT: Negative for hearing loss.    Eyes: Negative for visual disturbance.   Respiratory: Negative for chest tightness and shortness of breath.    Cardiovascular: Negative for chest pain.   Gastrointestinal: Negative for abdominal pain, constipation, diarrhea, nausea and vomiting.    Genitourinary: Negative for difficulty urinating.   Musculoskeletal: Positive for back pain. Negative for gait problem and neck pain.   Skin: Negative for rash.   Neurological: Negative for dizziness, weakness, light-headedness, numbness and headaches.   Psychiatric/Behavioral: Positive for sleep disturbance. Negative for hallucinations and suicidal ideas. The patient is not nervous/anxious.        Past Medical History:   Diagnosis Date    Chronic midline low back pain without sciatica 10/2/2017    Diabetes mellitus with neurological manifestations, uncontrolled 1/24/2017    Diabetic polyneuropathy associated with type 2 diabetes mellitus 1/24/2017    Diabetic polyneuropathy associated with type 2 diabetes mellitus     Essential hypertension 1/24/2017    Gastroesophageal reflux disease 1/24/2017    Hyperlipidemia 1/24/2017    Insomnia 1/24/2017    Long-term insulin use 1/24/2017    Lumbar spondylosis 11/13/2017    Nuclear sclerosis of both eyes 8/24/2017    Obesity     Uncontrolled type 2 diabetes mellitus without complication, with long-term current use of insulin 1/24/2017       Past Surgical History:   Procedure Laterality Date    BACK SURGERY      2002    CATARACT EXTRACTION W/  INTRAOCULAR LENS IMPLANT Right 08/29/2017    Dr. Hong    COLONOSCOPY N/A 2/21/2017    Procedure: COLONOSCOPY;  Surgeon: Robb Rosado MD;  Location: [facility];  Service: Endoscopy;  Laterality: N/A;    COLONOSCOPY N/A 2/21/2017    Performed by [facility]b [facility]ado [facility] at [facility]O    CORONARY ARTERY BYPASS GRAFT      March 2016    EPIDURAL STEROID INJECTION Bilateral 11/14/2018    Procedure: Lumbar Medial Branch Blocks;  Surgeon: Eze Byrd Jr., MD;  Location: [facility];  Service: Pain Management;  Laterality: Bilateral;  Bilateral Lumbar Medial Branch Blocks L2, L3, L4, L5    [id number]  [id number]    Arrive @ 1150; NO Sedation    EPIDURAL STEROID INJECTION Bilateral 11/28/2018    Procedure: Injection,  Steroid, Epidural;  Surgeon: Eze Byrd Jr., MD;  Location: [facility] ENDO;  Service: Pain Management;  Laterality: Bilateral;  Bilateral Sacroiliac Joint Steroid Injections    [id number]    Arrive @ 0910    Injection, Steroid, Epidural Bilateral 11/28/2018    Performed by Eze Byrd Jr., MD at [facility] ENDO    INJECTION-STEROID-EPIDURAL-TRANSFORAMINAL Right 11/29/2017    Performed by Eze [facility]rd Jr., MD at [facility] OR    INSERTION-INTRAOCULAR LENS (IOL) Right 8/29/2017    Performed by Nickolas Hong [facility] at [facility] OR [facility]ST [facility]    Lumbar Medial Branch Blocks Bilateral 11/14/2018    Performed by Eze Byrd Jr., MD at [facility] ENDO    PHACOEMULSIFICATION-ASPIRATION-CATARACT Right 8/29/2017    Performed by [facility] at [facility] OR [facility]    TONSILLECTOMY         Social History     Socioeconomic History    Marital status:      Spouse name: Not on file    Number of children: Not on file    Years of education: Not on file    Highest education level: Not on file   Social Needs    Financial resource strain: Not on file    Food insecurity - worry: Not on file    Food insecurity - inability: Not on file    Transportation needs - medical: Not on file    Transportation needs - non-medical: Not on file   Occupational History    Not on file   Tobacco Use    Smoking status: Never Smoker    Smokeless tobacco: Never Used   Substance and Sexual Activity    Alcohol use: No    Drug use: No    Sexual activity: Yes     Partners: Female   Other Topics Concern    Not on file   Social History Narrative    Not on file       Review of patient's allergies indicates:   Allergen Reactions    Penicillins Other (See Comments)     Unknown reaction, Had a reaction as a child    Shrimp Itching     Hand itching       Current Outpatient Medications on File Prior to Visit   Medication Sig Dispense Refill    ACCU-CHEK MOIZ CONTROL SOLN Soln       ACCU-CHEK MOIZ PLUS TEST STRP Strp TEST THREE  "TIMES DAILY 300 strip 12    ACCU-CHEK SOFTCLIX LANCETS Misc       aspirin 325 MG tablet Take 325 mg by mouth every morning.       atorvastatin (LIPITOR) 40 MG tablet Take 1 tablet (40 mg total) by mouth every morning. 90 tablet 3    BD ALCOHOL SWABS PadM       BD INSULIN PEN NEEDLE UF MINI 31 gauge x 3/16" Ndle       BD ULTRA-FINE ROLAND PEN NEEDLES 32 gauge x 5/32" Ndle       fenofibrate 160 MG Tab TAKE 1 TABLET EVERY MORNING 90 tablet 4    gabapentin (NEURONTIN) 100 MG capsule TAKE 2 CAPSULES IN THE MORNING AND 3 CAPSULES WITH DINNER 450 capsule 3    lisinopril (PRINIVIL,ZESTRIL) 40 MG tablet Take 1 tablet (40 mg total) by mouth daily with dinner or evening meal. 90 tablet 90    metFORMIN (GLUCOPHAGE-XR) 500 MG 24 hr tablet Take 2 tablets (1,000 mg total) by mouth 2 (two) times daily with meals. Takes two 500 mg twice a day 120 tablet 3    metoprolol succinate (TOPROL-XL) 50 MG 24 hr tablet Take 1 tablet (50 mg total) by mouth daily with dinner or evening meal. 90 tablet 4    nateglinide (STARLIX) 120 MG tablet       omeprazole (PRILOSEC) 20 MG capsule Take 1 capsule (20 mg total) by mouth daily with dinner or evening meal. 90 capsule 3    sildenafil (REVATIO) 20 mg Tab 1-5 pills at a time per day prn 30 tablet 11    tiZANidine (ZANAFLEX) 4 MG tablet Take 4 tablets (16 mg total) by mouth every evening. 360 tablet 3    triazolam (HALCION) 0.25 MG Tab TK 1 T PO QHS, prn  5    LANTUS SOLOSTAR U-100 INSULIN 100 unit/mL (3 mL) InPn pen INJECT 13 UNITS INTO THE SKIN EVERY MORNING. (Patient taking differently: Inject 15 Units into the skin every morning. ) 15 mL 2     No current facility-administered medications on file prior to visit.        Objective:      /61   Pulse 80   Resp 18   Ht 5' 7" (1.702 m)   Wt 88.6 kg (195 lb 6.4 oz)   SpO2 99%   BMI 30.60 kg/m²     Exam:  GEN:  Well developed, well nourished.  No acute distress.   HEENT:  No trauma.  Mucous membranes moist.  Nares patent " bilaterally.  PSYCH: Normal affect. Thought content appropriate.  CHEST:  Breathing symmetric.  No audible wheezing.  ABD: Soft, non-distended.  SKIN:  Warm, pink, dry.  No rash on exposed areas.    EXT:  No cyanosis, clubbing, or edema.  No color change or changes in nail or hair growth.  NEURO/MUSCULOSKELETAL:  Fully alert, oriented, and appropriate. Speech normal ivania. No cranial nerve deficits.   Gait: Normal.  No focal motor deficits.       Imaging:  Narrative     MRI lumbar spine without contrast.    Comparison: None.    Technique: Sagittal T1, T2, stir and axial T2 and T1-weighted images were obtained.  No IV contrast was utilized.    Results: The alignment of the lumbar spine is normal.  The vertebral body heights are well-maintained.  There is mild disc desiccation at all level.  Moderate disc space narrowing at L4-L5.  No evidence of malignant bone marrow replacement process or infection.  The conus medullaris terminates in good position.    T12-L1: Mild diffuse disc bulge, no central canal or foraminal stenosis.  The facet joints appear normal.    L1-L2: Mild diffuse disc bulge, mild facet joint degenerative change, no central canal or foraminal stenosis.    L2-L3: Mild diffuse disc bulge, moderate facet joint osseous hypertrophy.  No central canal or significant foramina narrowing.    L3-L4: Mild diffuse disc bulge, moderate facet joint osseous hypertrophy.  There is no central canal stenosis.  There is no greater than mild foraminal narrowing.    L4-L5: Laminectomy defect on the right.  There is a diffuse disc bulge and what appears to be a right paracentral disc extrusion which could abut the descending and exiting nerve roots , there is good decompression of the canal posteriorly.  There is moderate to severe bilateral foraminal narrowing.    L5-S1: Diffuse disc bulge and moderate to severe facet joint osseous hypertrophy, no central canal stenosis.  There is severe bilateral foramina  narrowing.    The paraspinal soft tissues appear normal.   Impression         Postoperative changes of right laminectomy at L4-L5.  There is a diffuse disc bulge with a right paracentral/foraminal disc extrusion likely to abut on the descending and right exiting nerve root.  There is moderate to severe bilateral foraminal narrowing at this level.  There is severe bilateral foraminal also seen at L5-S1 due to facet joint osseous hypertrophy.          Electronically signed by: ISAI FERNANDEZ MD  Date: 08/23/17  Time: 08:40          Assessment:       Encounter Diagnoses   Name Primary?    DDD (degenerative disc disease), lumbar Yes    Lumbar spondylosis     Other osteoarthritis of spine, lumbosacral region     Sacroiliac joint pain          Plan:       Driss was seen today for follow-up.    Diagnoses and all orders for this visit:    DDD (degenerative disc disease), lumbar    Lumbar spondylosis    Other osteoarthritis of spine, lumbosacral region    Sacroiliac joint pain        Driss Burttima Akbar is a 68 y.o. male with improved chronic low back pain after SIJ injections.    1. Continue TIzanidine 16mg QHS PRN.  2. Continue gabapentin.  3. RTC PRN.

## 2019-01-17 ENCOUNTER — PES CALL (OUTPATIENT)
Dept: ADMINISTRATIVE | Facility: CLINIC | Age: 69
End: 2019-01-17

## 2019-02-07 ENCOUNTER — OFFICE VISIT (OUTPATIENT)
Dept: PAIN MEDICINE | Facility: CLINIC | Age: 69
End: 2019-02-07
Payer: MEDICARE

## 2019-02-07 VITALS
HEIGHT: 67 IN | HEART RATE: 71 BPM | RESPIRATION RATE: 18 BRPM | DIASTOLIC BLOOD PRESSURE: 77 MMHG | OXYGEN SATURATION: 97 % | WEIGHT: 195 LBS | SYSTOLIC BLOOD PRESSURE: 174 MMHG | BODY MASS INDEX: 30.61 KG/M2

## 2019-02-07 DIAGNOSIS — M51.36 DDD (DEGENERATIVE DISC DISEASE), LUMBAR: Primary | ICD-10-CM

## 2019-02-07 DIAGNOSIS — M48.061 SPINAL STENOSIS OF LUMBAR REGION, UNSPECIFIED WHETHER NEUROGENIC CLAUDICATION PRESENT: ICD-10-CM

## 2019-02-07 DIAGNOSIS — M47.816 LUMBAR SPONDYLOSIS: ICD-10-CM

## 2019-02-07 PROCEDURE — 1101F PT FALLS ASSESS-DOCD LE1/YR: CPT | Mod: HCNC,CPTII,S$GLB, | Performed by: PAIN MEDICINE

## 2019-02-07 PROCEDURE — 1101F PR PT FALLS ASSESS DOC 0-1 FALLS W/OUT INJ PAST YR: ICD-10-PCS | Mod: HCNC,CPTII,S$GLB, | Performed by: PAIN MEDICINE

## 2019-02-07 PROCEDURE — 99999 PR PBB SHADOW E&M-EST. PATIENT-LVL III: ICD-10-PCS | Mod: PBBFAC,HCNC,, | Performed by: PAIN MEDICINE

## 2019-02-07 PROCEDURE — 3077F SYST BP >= 140 MM HG: CPT | Mod: HCNC,CPTII,S$GLB, | Performed by: PAIN MEDICINE

## 2019-02-07 PROCEDURE — 3077F PR MOST RECENT SYSTOLIC BLOOD PRESSURE >= 140 MM HG: ICD-10-PCS | Mod: HCNC,CPTII,S$GLB, | Performed by: PAIN MEDICINE

## 2019-02-07 PROCEDURE — 99214 OFFICE O/P EST MOD 30 MIN: CPT | Mod: HCNC,S$GLB,, | Performed by: PAIN MEDICINE

## 2019-02-07 PROCEDURE — 3078F PR MOST RECENT DIASTOLIC BLOOD PRESSURE < 80 MM HG: ICD-10-PCS | Mod: HCNC,CPTII,S$GLB, | Performed by: PAIN MEDICINE

## 2019-02-07 PROCEDURE — 99214 PR OFFICE/OUTPT VISIT, EST, LEVL IV, 30-39 MIN: ICD-10-PCS | Mod: HCNC,S$GLB,, | Performed by: PAIN MEDICINE

## 2019-02-07 PROCEDURE — 99999 PR PBB SHADOW E&M-EST. PATIENT-LVL III: CPT | Mod: PBBFAC,HCNC,, | Performed by: PAIN MEDICINE

## 2019-02-07 PROCEDURE — 3078F DIAST BP <80 MM HG: CPT | Mod: HCNC,CPTII,S$GLB, | Performed by: PAIN MEDICINE

## 2019-02-07 RX ORDER — HYDROCODONE BITARTRATE AND ACETAMINOPHEN 10; 325 MG/1; MG/1
1 TABLET ORAL EVERY 8 HOURS PRN
Qty: 30 TABLET | Refills: 0 | Status: SHIPPED | OUTPATIENT
Start: 2019-02-07 | End: 2019-02-17

## 2019-02-07 NOTE — PROGRESS NOTES
Subjective:     Patient ID: Driss Hernandez Jr. is a 68 y.o. male    Chief Complaint: Back Pain (patient experiences stabbing and shooting pain in R side of lower back-possible pinched nerve)      Referred by: No ref. provider found      HPI:    Interval History (2/7/19):  He returns today for follow up.  He reports that he has been having acute right-sided low back pain for about 1 week.  He denies any specific inciting event or injury.  The pain is located in the lateral aspect of the right lumbosacral region.  It does not radiate.  He denies any associated numbness, tingling, weakness, bowel bladder dysfunction.  The pain varies in intensity from day-to-day.  The pain is much worse with coughing and sneezing and sitting with a forward flexed posture.  He is still able to go to the gym at times.      Interval History (12/17/18):  He returns today for follow up.  He reports that bilateral SIJ injections has been helpful for the low back pain. He reports about 80% relief. He does not feel that he needs any further interventions at this time      Interval History (11/19/18):  He returns today for follow up.  He reports that bilateral lumbar medial branch blocks were not helpful. Pain is unchanged in quality and location since last visit. He denies any new symptoms.       Interval History (10/16/18):  He returns today for follow up.  He reports that he has continued to have bilateral low back pain. He still has some right lower extremity pain, but this is not as bothersome as his low back pain. The pain is located across the lower lumbar region R>L. It does not radiate. It is not associated with any numbness, tingling, weakness or b/b dysfunction. The pain is worse with activity. He also requests refills of tizanidine.      Interval History (12/18/17):  He returns today for follow up.  He reports that right L4 TFESI has been helpful for the right lumbar radicular pain. He reports 100% relief. He still has some  lower back pain, but would prefer to discuss this later. Otherwise he is doing well.       Interval History (11/13/17):  He returns today for follow up.  He reports that PT has been helpful for the low back pain. He still has significant right foot and ankle pain when sitting in a reclined postion. This is the most painful area. He also has low back pain that is constant but does not bother him as much. Otherwise he is doing well.       Driss Hernandez Jr. is a 68 y.o. male who presents today with chronic bilateral low back pain R>>>L. Pain started over 40 years ago. No inciting injury or event noted. Pain is located mainly on the right side of the lower lumbar paraspinals. About 5 weeks ago, patient began to have right lwoer extremity pain that he felt was related to his back pain, but this has subsided. He denies any numbness, tingling, weakness, or b/b dysfunction. The pain is described as dull, but can become sharp at times. Patient states that his pain is worse in the morning. He does not sleep well. States that he wakes up every 45 minutes. Has taken Tizanidine PRN in the pat, but cannot recall how it affected him. Probably has not taken in over a year. Cannot take NSAIDs due to decreased renal function. Has taken in the past with mild relief.  This pain is described in detail below.    Physical Therapy: Has completed course of PT and continues HEP    Non-pharmacologic Treatment: Nothing really helps         · TENS? No    Pain Medications:         · Currently taking: Gabapentin 500mg per day, Tizandine 16mg QHS. Tylenol p.r.n.    · Has tried in the past:  NSAIDs,     · Has not tried: Opioids, Tylenol, TCAs, SNRIs, topical creams    Blood thinners: ASA 325mg daily    Interventional Therapies:   10/28/18 - bilateral SIJ injections -  80% relief  11/29/17 - Right L4 TFESI - 100% relief  11/14/18 - Bilateral L2, L3, L4 and L5 MBBs - No relief noted    Relevant Surgeries: Previous right L4  hemilaminectomy    Affecting sleep? Yes    Affecting daily activities? yes    Depressive symptoms? no          · SI/HI? No    Work status: Unemployed    Pain Scores:    Best:       0/10  Worst:     10/10  Usually:   3/10  Today:    10/10    Review of Systems   Constitutional: Negative for activity change, appetite change, chills, fatigue, fever and unexpected weight change.   HENT: Negative for hearing loss.    Eyes: Negative for visual disturbance.   Respiratory: Negative for chest tightness and shortness of breath.    Cardiovascular: Negative for chest pain.   Gastrointestinal: Negative for abdominal pain, constipation, diarrhea, nausea and vomiting.   Genitourinary: Negative for difficulty urinating.   Musculoskeletal: Positive for back pain. Negative for gait problem and neck pain.   Skin: Negative for rash.   Neurological: Negative for dizziness, weakness, light-headedness, numbness and headaches.   Psychiatric/Behavioral: Positive for sleep disturbance. Negative for hallucinations and suicidal ideas. The patient is not nervous/anxious.        Past Medical History:   Diagnosis Date    Chronic midline low back pain without sciatica 10/2/2017    Diabetes mellitus with neurological manifestations, uncontrolled 1/24/2017    Diabetic polyneuropathy associated with type 2 diabetes mellitus 1/24/2017    Diabetic polyneuropathy associated with type 2 diabetes mellitus     Essential hypertension 1/24/2017    Gastroesophageal reflux disease 1/24/2017    Hyperlipidemia 1/24/2017    Insomnia 1/24/2017    Long-term insulin use 1/24/2017    Lumbar spondylosis 11/13/2017    Nuclear sclerosis of both eyes 8/24/2017    Obesity     Uncontrolled type 2 diabetes mellitus without complication, with long-term current use of insulin 1/24/2017       Past Surgical History:   Procedure Laterality Date    BACK SURGERY      2002    CATARACT EXTRACTION W/  INTRAOCULAR LENS IMPLANT Right 08/29/2017    Dr. Hong     COLONOSCOPY N/A 2/21/2017    Performed [facility] Robb [facility]ado [facility] at [facility]    CORONARY ARTERY BYPASS GRAFT      March 2016    Injection, Steroid, Epidural Bilateral 11/28/2018    Performed by Eze Byrd Jr., MD at [facility] END[facility]    INJECTION-STEROID-EPIDURAL-TRANSFORAMINAL Right 11/29/2017    Performed [facility] Eze Byrd Jr[facility] at [facility] OR    INSERTION-INTRAOCULAR LENS (IOL) Right 8/29/2017    Per[facility] by Nickolas [facility]    Lumbar Medial Branch Blocks Bilateral 11/14/2018    Performed by Eze Byrd Jr., MD at [facility]O    PHACOEMULSIFICATION-ASPIRATION-CATARACT Right 8/29/2017    Per[facility]    TONSILLECTOMY         Social History     Socioeconomic History    Marital status:      Spouse name: Not on file    Number of children: Not on file    Years of education: Not on file    Highest education level: Not on file   Social Needs    Financial resource strain: Not on file    Food insecurity - worry: Not on file    Food insecurity - inability: Not on file    Transportation needs - medical: Not on file    Transportation needs - non-medical: Not on file   Occupational History    Not on file   Tobacco Use    Smoking status: Never Smoker    Smokeless tobacco: Never Used   Substance and Sexual Activity    Alcohol use: No    Drug use: No    Sexual activity: Yes     Partners: Female   Other Topics Concern    Not on file   Social History Narrative    Not on file       Review of patient's allergies indicates:   Allergen Reactions    Penicillins Other (See Comments)     Unknown reaction, Had a reaction as a child    Shrimp Itching     Hand itching       Current Outpatient Medications on File Prior to Visit   Medication Sig Dispense Refill    ACCU-CHEK MOIZ CONTROL SOLN Soln       ACCU-CHEK MOIZ PLUS TEST STRP Strp TEST THREE TIMES DAILY 300 strip 12    ACCU-CHEK SOFTCLIX LANCETS Misc       aspirin  "325 MG tablet Take 325 mg by mouth every morning.       atorvastatin (LIPITOR) 40 MG tablet Take 1 tablet (40 mg total) by mouth every morning. 90 tablet 3    BD ALCOHOL SWABS PadM       BD INSULIN PEN NEEDLE UF MINI 31 gauge x 3/16" Ndle       BD ULTRA-FINE ROLAND PEN NEEDLES 32 gauge x 5/32" Ndle       fenofibrate 160 MG Tab TAKE 1 TABLET EVERY MORNING 90 tablet 4    gabapentin (NEURONTIN) 100 MG capsule TAKE 2 CAPSULES IN THE MORNING AND 3 CAPSULES WITH DINNER 450 capsule 3    lisinopril (PRINIVIL,ZESTRIL) 40 MG tablet Take 1 tablet (40 mg total) by mouth daily with dinner or evening meal. 90 tablet 90    metFORMIN (GLUCOPHAGE-XR) 500 MG 24 hr tablet Take 2 tablets (1,000 mg total) by mouth 2 (two) times daily with meals. Takes two 500 mg twice a day 120 tablet 3    metoprolol succinate (TOPROL-XL) 50 MG 24 hr tablet Take 1 tablet (50 mg total) by mouth daily with dinner or evening meal. 90 tablet 4    nateglinide (STARLIX) 120 MG tablet       omeprazole (PRILOSEC) 20 MG capsule Take 1 capsule (20 mg total) by mouth daily with dinner or evening meal. 90 capsule 3    sildenafil (REVATIO) 20 mg Tab 1-5 pills at a time per day prn 30 tablet 11    tiZANidine (ZANAFLEX) 4 MG tablet Take 4 tablets (16 mg total) by mouth every evening. 360 tablet 3    triazolam (HALCION) 0.25 MG Tab TK 1 T PO QHS, prn  5    LANTUS SOLOSTAR U-100 INSULIN 100 unit/mL (3 mL) InPn pen INJECT 13 UNITS INTO THE SKIN EVERY MORNING. (Patient taking differently: Inject 15 Units into the skin every morning. ) 15 mL 2     No current facility-administered medications on file prior to visit.        Objective:      BP (!) 174/77   Pulse 71   Resp 18   Ht 5' 7" (1.702 m)   Wt 88.5 kg (195 lb)   SpO2 97%   BMI 30.54 kg/m²     Exam:  GEN:  Well developed, well nourished.  No acute distress.  Normal pain behavior.  HEENT:  No trauma.  Mucous membranes moist.  Nares patent bilaterally.  PSYCH: Normal affect. Thought content " appropriate.  CHEST:  Breathing symmetric.  No audible wheezing.  ABD: Soft, non-distended.  SKIN:  Warm, pink, dry.  No rash on exposed areas.    EXT:  No cyanosis, clubbing, or edema.  No color change or changes in nail or hair growth.  NEURO/MUSCULOSKELETAL:  Fully alert, oriented, and appropriate. Speech normal ivania. No cranial nerve deficits.   Gait:  Antalgic.  No trendelenburg sign bilaterally.   Motor Strength: 5/5 motor strength throughout lower extremities.   Sensory:  No sensory deficit in the lower extremities.   Reflexes:  2 + and symmetric throughout.  Downgoing Babinski's bilaterally.  No clonus or spasticity.  L-Spine:  Limited ROM with pain on flexion.  Unable to perform facet loading bilaterally.  Negative SLR bilaterally.  Right-sided low back pain reproduced with bilateral straight leg raise.    SI Joint/Hip:  Unable to perform BENJAMIN bilaterally.  Unable to perform FADIR bilaterally.  Pain reproduced with internal rotation of right hip.  Full range of motion of internal and external rotation of right hip.  No pain with external rotation of right hip.  TTP over right lumbar paraspinals  No TTP over bilateral SI joints, hips, piriformis muscles, or GTB.            Imaging:  Narrative     MRI lumbar spine without contrast.    Comparison: None.    Technique: Sagittal T1, T2, stir and axial T2 and T1-weighted images were obtained.  No IV contrast was utilized.    Results: The alignment of the lumbar spine is normal.  The vertebral body heights are well-maintained.  There is mild disc desiccation at all level.  Moderate disc space narrowing at L4-L5.  No evidence of malignant bone marrow replacement process or infection.  The conus medullaris terminates in good position.    T12-L1: Mild diffuse disc bulge, no central canal or foraminal stenosis.  The facet joints appear normal.    L1-L2: Mild diffuse disc bulge, mild facet joint degenerative change, no central canal or foraminal stenosis.    L2-L3:  Mild diffuse disc bulge, moderate facet joint osseous hypertrophy.  No central canal or significant foramina narrowing.    L3-L4: Mild diffuse disc bulge, moderate facet joint osseous hypertrophy.  There is no central canal stenosis.  There is no greater than mild foraminal narrowing.    L4-L5: Laminectomy defect on the right.  There is a diffuse disc bulge and what appears to be a right paracentral disc extrusion which could abut the descending and exiting nerve roots , there is good decompression of the canal posteriorly.  There is moderate to severe bilateral foraminal narrowing.    L5-S1: Diffuse disc bulge and moderate to severe facet joint osseous hypertrophy, no central canal stenosis.  There is severe bilateral foramina narrowing.    The paraspinal soft tissues appear normal.   Impression         Postoperative changes of right laminectomy at L4-L5.  There is a diffuse disc bulge with a right paracentral/foraminal disc extrusion likely to abut on the descending and right exiting nerve root.  There is moderate to severe bilateral foraminal narrowing at this level.  There is severe bilateral foraminal also seen at L5-S1 due to facet joint osseous hypertrophy.          Electronically signed by: ISAI FERNANDEZ MD  Date: 08/23/17  Time: 08:40          Assessment:       Encounter Diagnoses   Name Primary?    DDD (degenerative disc disease), lumbar Yes    Lumbar spondylosis     Spinal stenosis of lumbar region, unspecified whether neurogenic claudication present          Plan:       Driss was seen today for back pain.    Diagnoses and all orders for this visit:    DDD (degenerative disc disease), lumbar  -     HYDROcodone-acetaminophen (NORCO)  mg per tablet; Take 1 tablet by mouth every 8 (eight) hours as needed. MAY CAUSE, DEPENDENCE, SEDATION, COMA, OR  DEATH. DO NOT OPERATE MACHINERY.  -     X-Ray Hip 2 View Right; Future  -     X-Ray Lumbar Spine AP And Lateral; Future  -     MRI Lumbar Spine  Without Contrast; Future    Lumbar spondylosis  -     HYDROcodone-acetaminophen (NORCO)  mg per tablet; Take 1 tablet by mouth every 8 (eight) hours as needed. MAY CAUSE, DEPENDENCE, SEDATION, COMA, OR  DEATH. DO NOT OPERATE MACHINERY.  -     X-Ray Hip 2 View Right; Future  -     X-Ray Lumbar Spine AP And Lateral; Future  -     MRI Lumbar Spine Without Contrast; Future    Spinal stenosis of lumbar region, unspecified whether neurogenic claudication present  -     HYDROcodone-acetaminophen (NORCO)  mg per tablet; Take 1 tablet by mouth every 8 (eight) hours as needed. MAY CAUSE, DEPENDENCE, SEDATION, COMA, OR  DEATH. DO NOT OPERATE MACHINERY.  -     X-Ray Hip 2 View Right; Future  -     X-Ray Lumbar Spine AP And Lateral; Future  -     MRI Lumbar Spine Without Contrast; Future        Driss Hernandez Jr. is a 68 y.o. male with acute right-sided low back pain. Exact etiology is unclear at this time. Likely due to either acute lumbar disc herniation versus right hip pain.    1.  Right hip x-rays.  2.  Lumbar spine x-rays.  3.  Lumbar MRI to rule out acute disc herniation.  4.  [place] prescription monitoring program review today and no inconsistencies were noted.  5.  Start hydrocodone  mg q.8 hours p.r.n..  Patient was given a 10 day supply of 30 pills.  We discussed that this was only intended to help in the acute phase pain.   6.  Return to clinic after MRI to discuss imaging results and treatment options.

## 2019-02-08 ENCOUNTER — APPOINTMENT (OUTPATIENT)
Dept: RADIOLOGY | Facility: HOSPITAL | Age: 69
End: 2019-02-08
Attending: PAIN MEDICINE
Payer: MEDICARE

## 2019-02-08 DIAGNOSIS — M48.061 SPINAL STENOSIS OF LUMBAR REGION, UNSPECIFIED WHETHER NEUROGENIC CLAUDICATION PRESENT: ICD-10-CM

## 2019-02-08 DIAGNOSIS — M47.816 LUMBAR SPONDYLOSIS: ICD-10-CM

## 2019-02-08 DIAGNOSIS — M51.36 DDD (DEGENERATIVE DISC DISEASE), LUMBAR: ICD-10-CM

## 2019-02-08 PROCEDURE — 73502 X-RAY EXAM HIP UNI 2-3 VIEWS: CPT | Mod: 26,HCNC,RT, | Performed by: RADIOLOGY

## 2019-02-08 PROCEDURE — 73502 XR HIP 2 VIEW RIGHT: ICD-10-PCS | Mod: 26,HCNC,RT, | Performed by: RADIOLOGY

## 2019-02-08 PROCEDURE — 73502 X-RAY EXAM HIP UNI 2-3 VIEWS: CPT | Mod: TC,HCNC,FY,PN,RT

## 2019-02-11 ENCOUNTER — TELEPHONE (OUTPATIENT)
Dept: PAIN MEDICINE | Facility: CLINIC | Age: 69
End: 2019-02-11

## 2019-02-11 ENCOUNTER — HOSPITAL ENCOUNTER (OUTPATIENT)
Dept: RADIOLOGY | Facility: HOSPITAL | Age: 69
Discharge: HOME | End: 2019-02-11
Attending: PAIN MEDICINE
Payer: MEDICARE

## 2019-02-11 DIAGNOSIS — M47.816 LUMBAR SPONDYLOSIS: ICD-10-CM

## 2019-02-11 DIAGNOSIS — M48.061 SPINAL STENOSIS OF LUMBAR REGION, UNSPECIFIED WHETHER NEUROGENIC CLAUDICATION PRESENT: ICD-10-CM

## 2019-02-11 DIAGNOSIS — M51.36 DDD (DEGENERATIVE DISC DISEASE), LUMBAR: ICD-10-CM

## 2019-02-11 PROCEDURE — 72148 MRI LUMBAR SPINE W/O DYE: CPT | Mod: TC,HCNC

## 2019-02-11 PROCEDURE — 72148 MRI LUMBAR SPINE WITHOUT CONTRAST: ICD-10-PCS | Mod: 26,HCNC,, | Performed by: RADIOLOGY

## 2019-02-11 PROCEDURE — 72148 MRI LUMBAR SPINE W/O DYE: CPT | Mod: 26,HCNC,, | Performed by: RADIOLOGY

## 2019-02-11 NOTE — TELEPHONE ENCOUNTER
Message left reminding patient of Pain Management appointment scheduled for tomorrow at 9:15 am with Dr. Byrd.  Location information also included.

## 2019-02-12 ENCOUNTER — OFFICE VISIT (OUTPATIENT)
Dept: PAIN MEDICINE | Facility: CLINIC | Age: 69
End: 2019-02-12
Payer: MEDICARE

## 2019-02-12 VITALS
HEIGHT: 67 IN | BODY MASS INDEX: 31.55 KG/M2 | OXYGEN SATURATION: 99 % | SYSTOLIC BLOOD PRESSURE: 203 MMHG | WEIGHT: 201 LBS | RESPIRATION RATE: 18 BRPM | HEART RATE: 62 BPM | DIASTOLIC BLOOD PRESSURE: 90 MMHG

## 2019-02-12 DIAGNOSIS — M51.36 DDD (DEGENERATIVE DISC DISEASE), LUMBAR: Primary | ICD-10-CM

## 2019-02-12 DIAGNOSIS — M47.816 LUMBAR SPONDYLOSIS: ICD-10-CM

## 2019-02-12 DIAGNOSIS — M79.18 MYOFASCIAL PAIN ON RIGHT SIDE: ICD-10-CM

## 2019-02-12 DIAGNOSIS — M54.16 LUMBAR RADICULOPATHY: ICD-10-CM

## 2019-02-12 DIAGNOSIS — M48.061 SPINAL STENOSIS OF LUMBAR REGION, UNSPECIFIED WHETHER NEUROGENIC CLAUDICATION PRESENT: ICD-10-CM

## 2019-02-12 PROCEDURE — 20553 NJX 1/MLT TRIGGER POINTS 3/>: CPT | Mod: HCNC,S$GLB,, | Performed by: PAIN MEDICINE

## 2019-02-12 PROCEDURE — 3080F PR MOST RECENT DIASTOLIC BLOOD PRESSURE >= 90 MM HG: ICD-10-PCS | Mod: HCNC,CPTII,S$GLB, | Performed by: PAIN MEDICINE

## 2019-02-12 PROCEDURE — 1101F PT FALLS ASSESS-DOCD LE1/YR: CPT | Mod: HCNC,CPTII,S$GLB, | Performed by: PAIN MEDICINE

## 2019-02-12 PROCEDURE — 99214 OFFICE O/P EST MOD 30 MIN: CPT | Mod: 25,HCNC,S$GLB, | Performed by: PAIN MEDICINE

## 2019-02-12 PROCEDURE — 99999 PR PBB SHADOW E&M-EST. PATIENT-LVL III: CPT | Mod: PBBFAC,HCNC,, | Performed by: PAIN MEDICINE

## 2019-02-12 PROCEDURE — 1101F PR PT FALLS ASSESS DOC 0-1 FALLS W/OUT INJ PAST YR: ICD-10-PCS | Mod: HCNC,CPTII,S$GLB, | Performed by: PAIN MEDICINE

## 2019-02-12 PROCEDURE — 20553 PR INJECT TRIGGER POINTS, > 3: ICD-10-PCS | Mod: HCNC,S$GLB,, | Performed by: PAIN MEDICINE

## 2019-02-12 PROCEDURE — 3080F DIAST BP >= 90 MM HG: CPT | Mod: HCNC,CPTII,S$GLB, | Performed by: PAIN MEDICINE

## 2019-02-12 PROCEDURE — 99214 PR OFFICE/OUTPT VISIT, EST, LEVL IV, 30-39 MIN: ICD-10-PCS | Mod: 25,HCNC,S$GLB, | Performed by: PAIN MEDICINE

## 2019-02-12 PROCEDURE — 99999 PR PBB SHADOW E&M-EST. PATIENT-LVL III: ICD-10-PCS | Mod: PBBFAC,HCNC,, | Performed by: PAIN MEDICINE

## 2019-02-12 PROCEDURE — 3077F SYST BP >= 140 MM HG: CPT | Mod: HCNC,CPTII,S$GLB, | Performed by: PAIN MEDICINE

## 2019-02-12 PROCEDURE — 3077F PR MOST RECENT SYSTOLIC BLOOD PRESSURE >= 140 MM HG: ICD-10-PCS | Mod: HCNC,CPTII,S$GLB, | Performed by: PAIN MEDICINE

## 2019-02-12 RX ORDER — BETAMETHASONE SODIUM PHOSPHATE AND BETAMETHASONE ACETATE 3; 3 MG/ML; MG/ML
3 INJECTION, SUSPENSION INTRA-ARTICULAR; INTRALESIONAL; INTRAMUSCULAR; SOFT TISSUE
Status: COMPLETED | OUTPATIENT
Start: 2019-02-12 | End: 2019-02-12

## 2019-02-12 RX ADMIN — BETAMETHASONE SODIUM PHOSPHATE AND BETAMETHASONE ACETATE 3 MG: 3; 3 INJECTION, SUSPENSION INTRA-ARTICULAR; INTRALESIONAL; INTRAMUSCULAR; SOFT TISSUE at 09:02

## 2019-02-12 NOTE — PROGRESS NOTES
Subjective:     Patient ID: Driss Hernandez Jr. is a 68 y.o. male    Chief Complaint: Follow-up (Post MRI 02.11 & Xrays 02.08)      Referred by: No ref. provider found      HPI:    Interval History (2/12/19):  He returns today for follow up and imaging review.  He reports that his pain is unchanged since last encounter.  He denies any new or worsening neurologic symptoms.      Interval History (2/7/19):  He returns today for follow up.  He reports that he has been having acute right-sided low back pain for about 1 week.  He denies any specific inciting event or injury.  The pain is located in the lateral aspect of the right lumbosacral region.  It does not radiate.  He denies any associated numbness, tingling, weakness, bowel bladder dysfunction.  The pain varies in intensity from day-to-day.  The pain is much worse with coughing and sneezing and sitting with a forward flexed posture.  He is still able to go to the gym at times.      Interval History (12/17/18):  He returns today for follow up.  He reports that bilateral SIJ injections has been helpful for the low back pain. He reports about 80% relief. He does not feel that he needs any further interventions at this time      Interval History (11/19/18):  He returns today for follow up.  He reports that bilateral lumbar medial branch blocks were not helpful. Pain is unchanged in quality and location since last visit. He denies any new symptoms.       Interval History (10/16/18):  He returns today for follow up.  He reports that he has continued to have bilateral low back pain. He still has some right lower extremity pain, but this is not as bothersome as his low back pain. The pain is located across the lower lumbar region R>L. It does not radiate. It is not associated with any numbness, tingling, weakness or b/b dysfunction. The pain is worse with activity. He also requests refills of tizanidine.      Interval History (12/18/17):  He returns today for follow  up.  He reports that right L4 TFESI has been helpful for the right lumbar radicular pain. He reports 100% relief. He still has some lower back pain, but would prefer to discuss this later. Otherwise he is doing well.       Interval History (11/13/17):  He returns today for follow up.  He reports that PT has been helpful for the low back pain. He still has significant right foot and ankle pain when sitting in a reclined postion. This is the most painful area. He also has low back pain that is constant but does not bother him as much. Otherwise he is doing well.       Driss Hernandez Jr. is a 68 y.o. male who presents today with chronic bilateral low back pain R>>>L. Pain started over 40 years ago. No inciting injury or event noted. Pain is located mainly on the right side of the lower lumbar paraspinals. About 5 weeks ago, patient began to have right lwoer extremity pain that he felt was related to his back pain, but this has subsided. He denies any numbness, tingling, weakness, or b/b dysfunction. The pain is described as dull, but can become sharp at times. Patient states that his pain is worse in the morning. He does not sleep well. States that he wakes up every 45 minutes. Has taken Tizanidine PRN in the pat, but cannot recall how it affected him. Probably has not taken in over a year. Cannot take NSAIDs due to decreased renal function. Has taken in the past with mild relief.  This pain is described in detail below.    Physical Therapy: Has completed course of PT and continues HEP    Non-pharmacologic Treatment: Nothing really helps         · TENS? No    Pain Medications:         · Currently taking: Gabapentin 500mg per day, Tizandine 16mg QHS. Tylenol p.r.n.    · Has tried in the past:  NSAIDs,     · Has not tried: Opioids, Tylenol, TCAs, SNRIs, topical creams    Blood thinners: ASA 325mg daily    Interventional Therapies:   10/28/18 - bilateral SIJ injections -  80% relief  11/29/17 - Right L4 TFESI - 100%  relief  11/14/18 - Bilateral L2, L3, L4 and L5 MBBs - No relief noted    Relevant Surgeries: Previous right L4 hemilaminectomy    Affecting sleep? Yes    Affecting daily activities? yes    Depressive symptoms? no          · SI/HI? No    Work status: Unemployed    Pain Scores:    Best:       0/10  Worst:     10/10  Usually:   3/10  Today:    6/10    Review of Systems   Constitutional: Negative for activity change, appetite change, chills, fatigue, fever and unexpected weight change.   HENT: Negative for hearing loss.    Eyes: Negative for visual disturbance.   Respiratory: Negative for chest tightness and shortness of breath.    Cardiovascular: Negative for chest pain.   Gastrointestinal: Negative for abdominal pain, constipation, diarrhea, nausea and vomiting.   Genitourinary: Negative for difficulty urinating.   Musculoskeletal: Positive for back pain. Negative for gait problem and neck pain.   Skin: Negative for rash.   Neurological: Negative for dizziness, weakness, light-headedness, numbness and headaches.   Psychiatric/Behavioral: Positive for sleep disturbance. Negative for hallucinations and suicidal ideas. The patient is not nervous/anxious.        Past Medical History:   Diagnosis Date    Chronic midline low back pain without sciatica 10/2/2017    Diabetes mellitus with neurological manifestations, uncontrolled 1/24/2017    Diabetic polyneuropathy associated with type 2 diabetes mellitus 1/24/2017    Diabetic polyneuropathy associated with type 2 diabetes mellitus     Essential hypertension 1/24/2017    Gastroesophageal reflux disease 1/24/2017    Hyperlipidemia 1/24/2017    Insomnia 1/24/2017    Long-term insulin use 1/24/2017    Lumbar spondylosis 11/13/2017    Nuclear sclerosis of both eyes 8/24/2017    Obesity     Uncontrolled type 2 diabetes mellitus without complication, with long-term current use of insulin 1/24/2017       Past Surgical History:   Procedure Laterality Date    BACK  SURGERY      2002    CATARACT EXTRACTION W/  INTRAOCULAR LENS IMPLANT Right 08/29/2017    Dr. Hong    COLONOSCOPY N/A 2/21/2017    [facility]    CORONARY ARTERY BYPASS GRAFT      March 2016    Injection, Steroid, Epidural Bilateral 11/28/2018    [facility]ze [facility]rd Jr[facility] at [facility] END[facility]    INJECTION-STEROID-EPIDURAL-TRANSFORAMINAL Right 11/29/2017    [facility] at [facility] OR    INSERTION-INTRAOCULAR LENS (IOL) Right 8/29/2017    [facility]    Lumbar Medial Branch Blocks Bilateral 11/14/2018    [facility]ze [facility]rd Jr[facility] at [facility] ENDO    PHACOEMULSIFICATION-ASPIRATION-CATARACT Right 8/29/2017    [facility] OR [facility]    TONSILLECTOMY         Social History     Socioeconomic History    Marital status:      Spouse name: Not on file    Number of children: Not on file    Years of education: Not on file    Highest education level: Not on file   Social Needs    Financial resource strain: Not on file    Food insecurity - worry: Not on file    Food insecurity - inability: Not on file    Transportation needs - medical: Not on file    Transportation needs - non-medical: Not on file   Occupational History    Not on file   Tobacco Use    Smoking status: Never Smoker    Smokeless tobacco: Never Used   Substance and Sexual Activity    Alcohol use: No    Drug use: No    Sexual activity: Yes     Partners: Female   Other Topics Concern    Not on file   Social History Narrative    Not on file       Review of patient's allergies indicates:   Allergen Reactions    Penicillins Other (See Comments)     Unknown reaction, Had a reaction as a child    Shrimp Itching     Hand itching       Current Outpatient Medications on File Prior to Visit   Medication Sig Dispense Refill    ACCU-CHEK MOIZ CONTROL SOLN Soln       ACCU-CHEK MOIZ  "PLUS TEST STRP Strp TEST THREE TIMES DAILY 300 strip 12    ACCU-CHEK SOFTCLIX LANCETS Misc       aspirin 325 MG tablet Take 325 mg by mouth every morning.       atorvastatin (LIPITOR) 40 MG tablet Take 1 tablet (40 mg total) by mouth every morning. 90 tablet 3    BD ALCOHOL SWABS PadM       BD INSULIN PEN NEEDLE UF MINI 31 gauge x 3/16" Ndle       BD ULTRA-FINE ROLAND PEN NEEDLES 32 gauge x 5/32" Ndle       fenofibrate 160 MG Tab TAKE 1 TABLET EVERY MORNING 90 tablet 4    gabapentin (NEURONTIN) 100 MG capsule TAKE 2 CAPSULES IN THE MORNING AND 3 CAPSULES WITH DINNER 450 capsule 3    HYDROcodone-acetaminophen (NORCO)  mg per tablet Take 1 tablet by mouth every 8 (eight) hours as needed. MAY CAUSE, DEPENDENCE, SEDATION, COMA, OR  DEATH. DO NOT OPERATE MACHINERY. 30 tablet 0    LANTUS SOLOSTAR U-100 INSULIN 100 unit/mL (3 mL) InPn pen INJECT 13 UNITS INTO THE SKIN EVERY MORNING. (Patient taking differently: Inject 15 Units into the skin every morning. ) 15 mL 2    lisinopril (PRINIVIL,ZESTRIL) 40 MG tablet Take 1 tablet (40 mg total) by mouth daily with dinner or evening meal. 90 tablet 90    metFORMIN (GLUCOPHAGE-XR) 500 MG 24 hr tablet Take 2 tablets (1,000 mg total) by mouth 2 (two) times daily with meals. Takes two 500 mg twice a day 120 tablet 3    metoprolol succinate (TOPROL-XL) 50 MG 24 hr tablet Take 1 tablet (50 mg total) by mouth daily with dinner or evening meal. 90 tablet 4    nateglinide (STARLIX) 120 MG tablet       omeprazole (PRILOSEC) 20 MG capsule Take 1 capsule (20 mg total) by mouth daily with dinner or evening meal. 90 capsule 3    sildenafil (REVATIO) 20 mg Tab 1-5 pills at a time per day prn 30 tablet 11    tiZANidine (ZANAFLEX) 4 MG tablet Take 4 tablets (16 mg total) by mouth every evening. 360 tablet 3    triazolam (HALCION) 0.25 MG Tab TK 1 T PO QHS, prn  5     No current facility-administered medications on file prior to visit.        Objective:      BP (!) 203/90   " "Pulse 62   Resp 18   Ht 5' 7" (1.702 m)   Wt 91.2 kg (201 lb)   SpO2 99%   BMI 31.48 kg/m²     Exam:  GEN:  Well developed, well nourished.  No acute distress.  Normal pain behavior.  HEENT:  No trauma.  Mucous membranes moist.  Nares patent bilaterally.  PSYCH: Normal affect. Thought content appropriate.  CHEST:  Breathing symmetric.  No audible wheezing.  ABD: Soft, non-distended.  SKIN:  Warm, pink, dry.  No rash on exposed areas.    EXT:  No cyanosis, clubbing, or edema.  No color change or changes in nail or hair growth.  NEURO/MUSCULOSKELETAL:  Fully alert, oriented, and appropriate. Speech normal ivania. No cranial nerve deficits.   Gait:  Antalgic.  No trendelenburg sign bilaterally.   Motor Strength: 5/5 motor strength throughout lower extremities.   Sensory:  No sensory deficit in the lower extremities.   Reflexes:  2 + and symmetric throughout.  Downgoing Babinski's bilaterally.  No clonus or spasticity.  L-Spine:  Limited ROM with pain on extension.  Positive facet loading on the right.  Negative SLR bilaterally.  Right-sided low back pain reproduced with bilateral straight leg raise.    SI Joint/Hip:  Unable to perform BENJAMIN bilaterally.  Unable to perform FADIR bilaterally.  Pain reproduced with internal rotation of right hip.  Full range of motion of internal and external rotation of right hip.  No pain with external rotation of right hip.  TTP over right lumbar paraspinals  No TTP over bilateral SI joints, hips, piriformis muscles, or GTB.            Imaging:  Lumbar MRI without contrast performed yesterday:  No official report available at this time.  Images were reviewed by me compared to previous MRI.  No gross changes noted that would likely explain patient's current pain.    Assessment:       Encounter Diagnoses   Name Primary?    DDD (degenerative disc disease), lumbar Yes    Lumbar spondylosis     Spinal stenosis of lumbar region, unspecified whether neurogenic claudication present  "    Lumbar radiculopathy     Myofascial pain on right side          Plan:       Driss was seen today for follow-up.    Diagnoses and all orders for this visit:    DDD (degenerative disc disease), lumbar    Lumbar spondylosis    Spinal stenosis of lumbar region, unspecified whether neurogenic claudication present    Lumbar radiculopathy    Myofascial pain on right side  -     betamethasone acetate-betamethasone sodium phosphate injection 3 mg        Driss Hernandez Jr. is a 68 y.o. male with acute right-sided low back pain. Exact etiology is unclear at this time. Likely due to either acute lumbar disc herniation versus myofascial strain versus right hip pain versus lumbar facet pain.  No evidence of acute disc changes when comparing new MRI told one.    1.  Pertinent imaging studies reviewed by me. Imaging results were discussed with patient. Some degenerative changes to bilateral hip joints.  No significant changes noted by me to lumbar MRI.  2.  Right lumbar paraspinal trigger point injections done today.  3.  May consider right L3, L4 and L5 medial branch blocks if no improvement with trigger point injections.  4.  Patient will call if he decides to proceed with medial branch blocks.  Consent was obtained today.

## 2019-02-13 ENCOUNTER — OFFICE VISIT (OUTPATIENT)
Dept: FAMILY MEDICINE | Facility: CLINIC | Age: 69
End: 2019-02-13
Payer: MEDICARE

## 2019-02-13 VITALS
DIASTOLIC BLOOD PRESSURE: 80 MMHG | BODY MASS INDEX: 31.24 KG/M2 | HEIGHT: 67 IN | TEMPERATURE: 98 F | OXYGEN SATURATION: 99 % | SYSTOLIC BLOOD PRESSURE: 120 MMHG | WEIGHT: 199.06 LBS | HEART RATE: 67 BPM

## 2019-02-13 DIAGNOSIS — N18.30 STAGE 3 CHRONIC KIDNEY DISEASE: ICD-10-CM

## 2019-02-13 DIAGNOSIS — Z23 NEED FOR IMMUNIZATION AGAINST INFLUENZA: ICD-10-CM

## 2019-02-13 DIAGNOSIS — I10 ESSENTIAL HYPERTENSION: ICD-10-CM

## 2019-02-13 DIAGNOSIS — E11.42 DIABETIC POLYNEUROPATHY ASSOCIATED WITH TYPE 2 DIABETES MELLITUS: ICD-10-CM

## 2019-02-13 DIAGNOSIS — Z11.59 ENCOUNTER FOR HEPATITIS C SCREENING TEST FOR LOW RISK PATIENT: ICD-10-CM

## 2019-02-13 DIAGNOSIS — Z00.00 ENCOUNTER FOR PREVENTIVE HEALTH EXAMINATION: Primary | ICD-10-CM

## 2019-02-13 DIAGNOSIS — K56.600 PARTIAL SMALL BOWEL OBSTRUCTION: ICD-10-CM

## 2019-02-13 DIAGNOSIS — E11.9 DM TYPE 2 WITHOUT RETINOPATHY: ICD-10-CM

## 2019-02-13 DIAGNOSIS — E66.9 OBESITY (BMI 30.0-34.9): ICD-10-CM

## 2019-02-13 DIAGNOSIS — Z79.4 LONG-TERM INSULIN USE: ICD-10-CM

## 2019-02-13 DIAGNOSIS — M46.1 BILATERAL SACROILIITIS: ICD-10-CM

## 2019-02-13 PROCEDURE — 99499 UNLISTED E&M SERVICE: CPT | Mod: HCNC,S$GLB,, | Performed by: NURSE PRACTITIONER

## 2019-02-13 PROCEDURE — 90662 FLU VACCINE - HIGH DOSE (65+) PRESERVATIVE FREE IM: ICD-10-PCS | Mod: HCNC,S$GLB,, | Performed by: NURSE PRACTITIONER

## 2019-02-13 PROCEDURE — G0439 PPPS, SUBSEQ VISIT: HCPCS | Mod: HCNC,S$GLB,, | Performed by: NURSE PRACTITIONER

## 2019-02-13 PROCEDURE — G0008 FLU VACCINE - HIGH DOSE (65+) PRESERVATIVE FREE IM: ICD-10-PCS | Mod: HCNC,S$GLB,, | Performed by: NURSE PRACTITIONER

## 2019-02-13 PROCEDURE — 3079F DIAST BP 80-89 MM HG: CPT | Mod: HCNC,CPTII,S$GLB, | Performed by: NURSE PRACTITIONER

## 2019-02-13 PROCEDURE — 3079F PR MOST RECENT DIASTOLIC BLOOD PRESSURE 80-89 MM HG: ICD-10-PCS | Mod: HCNC,CPTII,S$GLB, | Performed by: NURSE PRACTITIONER

## 2019-02-13 PROCEDURE — 99999 PR PBB SHADOW E&M-EST. PATIENT-LVL V: CPT | Mod: PBBFAC,HCNC,, | Performed by: NURSE PRACTITIONER

## 2019-02-13 PROCEDURE — 99499 RISK ADDL DX/OHS AUDIT: ICD-10-PCS | Mod: HCNC,S$GLB,, | Performed by: NURSE PRACTITIONER

## 2019-02-13 PROCEDURE — 90662 IIV NO PRSV INCREASED AG IM: CPT | Mod: HCNC,S$GLB,, | Performed by: NURSE PRACTITIONER

## 2019-02-13 PROCEDURE — G0008 ADMIN INFLUENZA VIRUS VAC: HCPCS | Mod: HCNC,S$GLB,, | Performed by: NURSE PRACTITIONER

## 2019-02-13 PROCEDURE — 3045F PR MOST RECENT HEMOGLOBIN A1C LEVEL 7.0-9.0%: CPT | Mod: HCNC,CPTII,S$GLB, | Performed by: NURSE PRACTITIONER

## 2019-02-13 PROCEDURE — G0439 PR MEDICARE ANNUAL WELLNESS SUBSEQUENT VISIT: ICD-10-PCS | Mod: HCNC,S$GLB,, | Performed by: NURSE PRACTITIONER

## 2019-02-13 PROCEDURE — 3045F PR MOST RECENT HEMOGLOBIN A1C LEVEL 7.0-9.0%: ICD-10-PCS | Mod: HCNC,CPTII,S$GLB, | Performed by: NURSE PRACTITIONER

## 2019-02-13 PROCEDURE — 3074F PR MOST RECENT SYSTOLIC BLOOD PRESSURE < 130 MM HG: ICD-10-PCS | Mod: HCNC,CPTII,S$GLB, | Performed by: NURSE PRACTITIONER

## 2019-02-13 PROCEDURE — 99999 PR PBB SHADOW E&M-EST. PATIENT-LVL V: ICD-10-PCS | Mod: PBBFAC,HCNC,, | Performed by: NURSE PRACTITIONER

## 2019-02-13 PROCEDURE — 3074F SYST BP LT 130 MM HG: CPT | Mod: HCNC,CPTII,S$GLB, | Performed by: NURSE PRACTITIONER

## 2019-02-13 NOTE — PROGRESS NOTES
"Driss Hernandez presented for an initial Medicare AWV today. The following components were reviewed and updated:    · Medical history  · Family History  · Social history  · Allergies and Current Medications  · Health Risk Assessment  · Health Maintenance  · Care Team    **See Completed Assessments for Annual Wellness visit with in the encounter summary    The following assessments were completed:  · Depression Screening  · Cognitive function Screening  · Timed Get Up Test  · Whisper Test    Vitals:    02/13/19 0752   BP: 120/80   BP Location: Left arm   Patient Position: Sitting   BP Method: Large (Manual)   Pulse: 67   Temp: 98.3 °F (36.8 °C)   TempSrc: Oral   SpO2: 99%   Weight: 90.3 kg (199 lb 1.2 oz)   Height: 5' 7" (1.702 m)     Body mass index is 31.18 kg/m².   ]          Diagnoses and health risks identified today and associated recommendations/orders:  1. Encounter for preventive health examination  Counseled on age appropriate medical preventative services including age appropriate cancer screenings, age appropriate eye and dental exams, over all nutritional health, need for a consistent exercise regimen, and an over all push towards maintaining a vigorous and active lifestyle.  Counseled on age appropriate vaccines and discussed upcoming health care needs based on age/gender. Discussed good sleep hygiene and stress management.       2. Need for immunization against influenza  - Flu Vaccine - High Dose (PF) (65+)    3. Encounter for hepatitis C screening test for low risk patient  - Hepatitis C antibody; Future    4. Diabetes mellitus with neurological manifestations, uncontrolled  Diabetes currently is controlled. We discussed diabetic diet and regular exercise. We discussed home blood sugar monitoring, if appropriate. The current medical regimen is effective;  continue present plan and medications.        5. Diabetic polyneuropathy associated with type 2 diabetes mellitus  Most recent Ha1c 7.3 (decreased " from prev. 8.2). Discussed diet, continued participation in tolerable fitness activities.      6. DM type 2 without retinopathy  The current medical regimen is effective;  continue present plan and medications.      7. Essential hypertension  Discussed sodium restriction, maintaining ideal body weight and regular exercise program as physiologic means to achieve blood pressure control. The patient will strive towards this. The current medical regimen is effective; continue present plan and medications. Recommended patient to check home readings to monitor and see me for followup as scheduled or sooner as needed. Patient was educated that both decongestant and anti-inflammatory medication may raise blood pressure    8. Partial small bowel obstruction  Stable, asymptomatic, monitor    9. Long-term insulin use  The current medical regimen is effective;  continue present plan and medications.      10. Bilateral sacroiliitis  The current medical regimen is effective;  continue present plan and medications.      11. Stage 3 chronic kidney disease  The current medical regimen is effective;  continue present plan and medications.      12. Obesity (BMI 30.0-34.9)  Discussed diet, continued participation in tolerable fitness activities, health risks associated with obesity.       Provided Driss with a 5-10 year written screening schedule and personal prevention plan. Recommendations were developed using the USPSTF age appropriate recommendations. Education, counseling, and referrals were provided as needed.  After Visit Summary printed and given to patient which includes a list of additional screenings\tests needed.    No Follow-up on file.      Poncho Lei NP

## 2019-02-13 NOTE — PATIENT INSTRUCTIONS
Counseling and Referral of Other Preventative  (Italic type indicates deductible and co-insurance are waived)    Patient Name: Driss Hernandez  Today's Date: 2/13/2019    Health Maintenance       Date Due Completion Date    Hepatitis C Screening 1950 ---    Influenza Vaccine 08/01/2018 2/9/2018    Lipid Panel 09/05/2018 9/5/2017    Foot Exam 03/13/2019 3/13/2018 (Done)    Override on 3/13/2018: Done (patient deferred, reports it was done by Dr. Agrawal (endocrinologist))    Override on 3/6/2017: Done (completed by Endocrinology (Dr. Agrawal's office))    Eye Exam 04/12/2019 4/12/2018    Override on 1/10/2017: Done (External eye doctor)    Hemoglobin A1c 05/26/2019 11/26/2018    Colonoscopy 02/21/2027 2/21/2017    TETANUS VACCINE 04/26/2028 4/26/2018 (Declined)    Override on 4/26/2018: Declined        No orders of the defined types were placed in this encounter.    The following information is provided to all patients.  This information is to help you find resources for any of the problems found today that may be affecting your health:                Living healthy guide: www.[facility].[place].gov      Understanding Diabetes: www.diabetes.org      Eating healthy: www.cdc.gov/healthyweight      CDC home safety checklist: www.cdc.gov/steadi/patient.html      Agency on Aging: www.goea.[place].[facility]      Alcoholics anonymous (AA): www.aa.org      Physical Activity: www.nam.nih.gov/qn1fssh      Tobacco use: www.quitwithusla.org

## 2019-02-22 ENCOUNTER — LAB VISIT (OUTPATIENT)
Dept: LAB | Facility: HOSPITAL | Age: 69
End: 2019-02-22
Attending: INTERNAL MEDICINE
Payer: MEDICARE

## 2019-02-22 DIAGNOSIS — E78.5 HYPERLIPIDEMIA: ICD-10-CM

## 2019-02-22 DIAGNOSIS — I25.10 CVD (CARDIOVASCULAR DISEASE): ICD-10-CM

## 2019-02-22 DIAGNOSIS — R94.4 ABNORMAL KIDNEY FUNCTION STUDY: ICD-10-CM

## 2019-02-22 DIAGNOSIS — E87.5 HYPERKALEMIA: Primary | ICD-10-CM

## 2019-02-22 LAB
ALBUMIN SERPL BCP-MCNC: 3.8 G/DL
ALP SERPL-CCNC: 29 U/L
ALT SERPL W/O P-5'-P-CCNC: 32 U/L
ANION GAP SERPL CALC-SCNC: 11 MMOL/L
AST SERPL-CCNC: 23 U/L
BILIRUB SERPL-MCNC: 0.2 MG/DL
BUN SERPL-MCNC: 34 MG/DL
CALCIUM SERPL-MCNC: 10 MG/DL
CHLORIDE SERPL-SCNC: 108 MMOL/L
CHOLEST SERPL-MCNC: 129 MG/DL
CHOLEST/HDLC SERPL: 6.1 {RATIO}
CO2 SERPL-SCNC: 19 MMOL/L
CREAT SERPL-MCNC: 1.3 MG/DL
EST. GFR  (AFRICAN AMERICAN): >60 ML/MIN/1.73 M^2
EST. GFR  (NON AFRICAN AMERICAN): 55.7 ML/MIN/1.73 M^2
ESTIMATED AVG GLUCOSE: 183 MG/DL
GLUCOSE SERPL-MCNC: 205 MG/DL
HBA1C MFR BLD HPLC: 8 %
HDLC SERPL-MCNC: 21 MG/DL
HDLC SERPL: 16.3 %
LDLC SERPL CALC-MCNC: 30.4 MG/DL
NONHDLC SERPL-MCNC: 108 MG/DL
POTASSIUM SERPL-SCNC: 6.4 MMOL/L
PROT SERPL-MCNC: 6.7 G/DL
SODIUM SERPL-SCNC: 138 MMOL/L
T4 FREE SERPL-MCNC: 0.93 NG/DL
TRIGL SERPL-MCNC: 388 MG/DL
TSH SERPL DL<=0.005 MIU/L-ACNC: 2.5 UIU/ML

## 2019-02-22 PROCEDURE — 80061 LIPID PANEL: CPT | Mod: HCNC

## 2019-02-22 PROCEDURE — 84443 ASSAY THYROID STIM HORMONE: CPT | Mod: HCNC

## 2019-02-22 PROCEDURE — 80053 COMPREHEN METABOLIC PANEL: CPT | Mod: HCNC

## 2019-02-22 PROCEDURE — 83036 HEMOGLOBIN GLYCOSYLATED A1C: CPT | Mod: HCNC

## 2019-02-22 PROCEDURE — 84439 ASSAY OF FREE THYROXINE: CPT | Mod: HCNC

## 2019-02-25 ENCOUNTER — LAB VISIT (OUTPATIENT)
Dept: LAB | Facility: HOSPITAL | Age: 69
End: 2019-02-25
Attending: INTERNAL MEDICINE
Payer: MEDICARE

## 2019-02-25 DIAGNOSIS — E87.5 HYPERPOTASSEMIA: Primary | ICD-10-CM

## 2019-02-25 LAB
ANION GAP SERPL CALC-SCNC: 9 MMOL/L
BUN SERPL-MCNC: 26 MG/DL
CALCIUM SERPL-MCNC: 10 MG/DL
CHLORIDE SERPL-SCNC: 106 MMOL/L
CO2 SERPL-SCNC: 22 MMOL/L
CREAT SERPL-MCNC: 1.2 MG/DL
EST. GFR  (AFRICAN AMERICAN): >60 ML/MIN/1.73 M^2
EST. GFR  (NON AFRICAN AMERICAN): >60 ML/MIN/1.73 M^2
GLUCOSE SERPL-MCNC: 147 MG/DL
POTASSIUM SERPL-SCNC: 5.2 MMOL/L
SODIUM SERPL-SCNC: 137 MMOL/L

## 2019-02-25 PROCEDURE — 36415 COLL VENOUS BLD VENIPUNCTURE: CPT | Mod: HCNC,PO

## 2019-02-25 PROCEDURE — 80048 BASIC METABOLIC PNL TOTAL CA: CPT | Mod: HCNC

## 2019-03-08 DIAGNOSIS — I10 ESSENTIAL HYPERTENSION: ICD-10-CM

## 2019-03-08 RX ORDER — METFORMIN HYDROCHLORIDE 500 MG/1
1000 TABLET, EXTENDED RELEASE ORAL 2 TIMES DAILY WITH MEALS
Qty: 120 TABLET | Refills: 3 | Status: SHIPPED | OUTPATIENT
Start: 2019-03-08 | End: 2019-06-03 | Stop reason: SDUPTHER

## 2019-03-08 RX ORDER — METOPROLOL SUCCINATE 50 MG/1
50 TABLET, EXTENDED RELEASE ORAL
Qty: 90 TABLET | Refills: 4 | Status: SHIPPED | OUTPATIENT
Start: 2019-03-08 | End: 2020-03-03

## 2019-03-15 ENCOUNTER — OFFICE VISIT (OUTPATIENT)
Dept: FAMILY MEDICINE | Facility: CLINIC | Age: 69
End: 2019-03-15
Payer: MEDICARE

## 2019-03-15 VITALS
BODY MASS INDEX: 31.49 KG/M2 | HEART RATE: 72 BPM | SYSTOLIC BLOOD PRESSURE: 160 MMHG | WEIGHT: 200.63 LBS | OXYGEN SATURATION: 97 % | HEIGHT: 67 IN | DIASTOLIC BLOOD PRESSURE: 78 MMHG | TEMPERATURE: 98 F

## 2019-03-15 DIAGNOSIS — E87.5 HYPERKALEMIA: ICD-10-CM

## 2019-03-15 DIAGNOSIS — I10 ESSENTIAL HYPERTENSION: Primary | ICD-10-CM

## 2019-03-15 DIAGNOSIS — N18.30 STAGE 3 CHRONIC KIDNEY DISEASE: ICD-10-CM

## 2019-03-15 DIAGNOSIS — E78.2 MIXED HYPERLIPIDEMIA: ICD-10-CM

## 2019-03-15 PROCEDURE — 1101F PR PT FALLS ASSESS DOC 0-1 FALLS W/OUT INJ PAST YR: ICD-10-PCS | Mod: HCNC,CPTII,S$GLB, | Performed by: INTERNAL MEDICINE

## 2019-03-15 PROCEDURE — 3045F PR MOST RECENT HEMOGLOBIN A1C LEVEL 7.0-9.0%: CPT | Mod: HCNC,CPTII,S$GLB, | Performed by: INTERNAL MEDICINE

## 2019-03-15 PROCEDURE — 3078F PR MOST RECENT DIASTOLIC BLOOD PRESSURE < 80 MM HG: ICD-10-PCS | Mod: HCNC,CPTII,S$GLB, | Performed by: INTERNAL MEDICINE

## 2019-03-15 PROCEDURE — 99215 PR OFFICE/OUTPT VISIT, EST, LEVL V, 40-54 MIN: ICD-10-PCS | Mod: HCNC,S$GLB,, | Performed by: INTERNAL MEDICINE

## 2019-03-15 PROCEDURE — 99999 PR PBB SHADOW E&M-EST. PATIENT-LVL III: ICD-10-PCS | Mod: PBBFAC,HCNC,, | Performed by: INTERNAL MEDICINE

## 2019-03-15 PROCEDURE — 1101F PT FALLS ASSESS-DOCD LE1/YR: CPT | Mod: HCNC,CPTII,S$GLB, | Performed by: INTERNAL MEDICINE

## 2019-03-15 PROCEDURE — 99215 OFFICE O/P EST HI 40 MIN: CPT | Mod: HCNC,S$GLB,, | Performed by: INTERNAL MEDICINE

## 2019-03-15 PROCEDURE — 3077F PR MOST RECENT SYSTOLIC BLOOD PRESSURE >= 140 MM HG: ICD-10-PCS | Mod: HCNC,CPTII,S$GLB, | Performed by: INTERNAL MEDICINE

## 2019-03-15 PROCEDURE — 3045F PR MOST RECENT HEMOGLOBIN A1C LEVEL 7.0-9.0%: ICD-10-PCS | Mod: HCNC,CPTII,S$GLB, | Performed by: INTERNAL MEDICINE

## 2019-03-15 PROCEDURE — 99999 PR PBB SHADOW E&M-EST. PATIENT-LVL III: CPT | Mod: PBBFAC,HCNC,, | Performed by: INTERNAL MEDICINE

## 2019-03-15 PROCEDURE — 3077F SYST BP >= 140 MM HG: CPT | Mod: HCNC,CPTII,S$GLB, | Performed by: INTERNAL MEDICINE

## 2019-03-15 PROCEDURE — 3078F DIAST BP <80 MM HG: CPT | Mod: HCNC,CPTII,S$GLB, | Performed by: INTERNAL MEDICINE

## 2019-03-15 RX ORDER — AMLODIPINE BESYLATE 10 MG/1
10 TABLET ORAL DAILY
Qty: 30 TABLET | Refills: 11 | Status: SHIPPED | OUTPATIENT
Start: 2019-03-15 | End: 2020-03-02

## 2019-03-15 NOTE — PROGRESS NOTES
Chief complaint:   discuss issues, follow-up on blood pressure    68-year-old white male with hypertension.  Apparently recently told to go the emergency room for some high potassium.  He was taken off lisinopril.  Blood pressure obviously has been going up since that time.  At home it is typically 150.  He is otherwise asymptomatic.  Long discussion today regarding the pathophysiology of his renal insufficiency, hyperkalemia as it pertains to diet and lisinopril.  I would agree with discontinuing lisinopril any except reassurance that his blood pressure went up as expected.  No edema problems and discussed adding Norvasc 5 mg watching for edema which should not be a problem.  He can monitor blood pressure and pulse.  Perhaps we can increase his metoprolol from .  His pulse today was 72 and that might be possible.  Occasional gets like pressure in his chest but is better with exertion and is better when he takes Tums or reassured likely to be reflux.  His previous heartburn was well treated with omeprazole 20.  Cardiac testing and so forth is negative and he is followed by Cardiology and we both agree this is very unlikely to be any form of angina.  Still followed by endocrinology, had some medication adjustments lately, diabetes still somewhat a uncontrolled.      Patient counseled at length regarding the multitude of all these issues above and it was therapeutic for him to go over all these issues.Total time over 45 minutes with over 50% counseling.    We also reviewed that he is following up for pain management for what sounds like trial of facet injections and possible nerve ablation.  He away and get some significant back pain.  He is off aspirin prior to this procedure.        ROS:   CONST: weight stable.  No further GI symptoms except as above      Past medical history:  1.  Uncontrolled diabetes with neuropathy, followed by Dr. Agrawal  2.  Coronary artery disease with triple bypass March 2016,  followed by the heart clinic. Now [facility], neg PET   3.  Back surgery .  5.  Hyperlipidemia.  6.  Hypertension.  7.  Never had colonoscopy  8.  Pneumovax and Prevnar given  according to records  9.  Right leg radiculopathy, confirmed by MRI, did respond epidural with [facility] pain management  10. Partial small-bowel obstruction 2018    Family history: Father  at 77 with stroke.  Mother  at 72 with heart problems, diabetes, hypertension.  2 sisters living in their 80s and one  at 82.  One brother  at 80 with some kidney disease and diabetes.  Sisters with diabetes, hypertension and stroke.  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  He drinks alcohol about 3 times.  Never smoked.      Vital signs as above     No edema    Driss was seen today for hypertension.    Diagnoses and all orders for this visit:    Essential hypertension, uncontrolled secondary to withdrawal of medication, obviously stay off lisinopril with the renal insufficiency and hyperkalemia.  Looks like he has had a tendency towards hyperkalemia over the past 2 years that we have been checking labs.  Readdressed high potassium foods.  Add Norvasc, otherwise plan as above.    Stage 3 chronic kidney disease, stable, sees Nephrology I believe    Uncontrolled type 2 diabetes mellitus with diabetic neuropathy, with long-term current use of insulin, followed by Endocrine    Mixed hyperlipidemia, apparently had arise and triglyceride and he was told to hold temporarily his weekly diabetes medication but unsure if that directly relates to the triglyceride issue and I not sure that was the reason the hold the medicine but either way he will follow-up with endocrine    Hyperkalemia    Other orders  -     amLODIPine (NORVASC) 10 MG tablet; Take 1 tablet (10 mg total) by mouth once daily.               Clinical note be  "sensitive based upon his anxiety referable to these issues.  "This note will not be shared with the patient."  "

## 2019-03-18 ENCOUNTER — OFFICE VISIT (OUTPATIENT)
Dept: PAIN MEDICINE | Facility: CLINIC | Age: 69
End: 2019-03-18
Payer: MEDICARE

## 2019-03-18 VITALS
RESPIRATION RATE: 18 BRPM | WEIGHT: 197.19 LBS | DIASTOLIC BLOOD PRESSURE: 80 MMHG | SYSTOLIC BLOOD PRESSURE: 172 MMHG | HEART RATE: 77 BPM | BODY MASS INDEX: 30.95 KG/M2 | HEIGHT: 67 IN | OXYGEN SATURATION: 98 %

## 2019-03-18 DIAGNOSIS — M53.3 SACROILIAC JOINT PAIN: ICD-10-CM

## 2019-03-18 DIAGNOSIS — M47.897 OTHER OSTEOARTHRITIS OF SPINE, LUMBOSACRAL REGION: ICD-10-CM

## 2019-03-18 DIAGNOSIS — M51.36 DDD (DEGENERATIVE DISC DISEASE), LUMBAR: Primary | ICD-10-CM

## 2019-03-18 DIAGNOSIS — M47.816 LUMBAR SPONDYLOSIS: ICD-10-CM

## 2019-03-18 DIAGNOSIS — M46.1 BILATERAL SACROILIITIS: ICD-10-CM

## 2019-03-18 PROCEDURE — 3077F PR MOST RECENT SYSTOLIC BLOOD PRESSURE >= 140 MM HG: ICD-10-PCS | Mod: HCNC,CPTII,S$GLB, | Performed by: PAIN MEDICINE

## 2019-03-18 PROCEDURE — 99213 OFFICE O/P EST LOW 20 MIN: CPT | Mod: HCNC,S$GLB,, | Performed by: PAIN MEDICINE

## 2019-03-18 PROCEDURE — 3079F PR MOST RECENT DIASTOLIC BLOOD PRESSURE 80-89 MM HG: ICD-10-PCS | Mod: HCNC,CPTII,S$GLB, | Performed by: PAIN MEDICINE

## 2019-03-18 PROCEDURE — 99999 PR PBB SHADOW E&M-EST. PATIENT-LVL III: CPT | Mod: PBBFAC,HCNC,, | Performed by: PAIN MEDICINE

## 2019-03-18 PROCEDURE — 3077F SYST BP >= 140 MM HG: CPT | Mod: HCNC,CPTII,S$GLB, | Performed by: PAIN MEDICINE

## 2019-03-18 PROCEDURE — 1101F PT FALLS ASSESS-DOCD LE1/YR: CPT | Mod: HCNC,CPTII,S$GLB, | Performed by: PAIN MEDICINE

## 2019-03-18 PROCEDURE — 3079F DIAST BP 80-89 MM HG: CPT | Mod: HCNC,CPTII,S$GLB, | Performed by: PAIN MEDICINE

## 2019-03-18 PROCEDURE — 99213 PR OFFICE/OUTPT VISIT, EST, LEVL III, 20-29 MIN: ICD-10-PCS | Mod: HCNC,S$GLB,, | Performed by: PAIN MEDICINE

## 2019-03-18 PROCEDURE — 1101F PR PT FALLS ASSESS DOC 0-1 FALLS W/OUT INJ PAST YR: ICD-10-PCS | Mod: HCNC,CPTII,S$GLB, | Performed by: PAIN MEDICINE

## 2019-03-18 PROCEDURE — 99999 PR PBB SHADOW E&M-EST. PATIENT-LVL III: ICD-10-PCS | Mod: PBBFAC,HCNC,, | Performed by: PAIN MEDICINE

## 2019-03-18 NOTE — PROGRESS NOTES
Subjective:     Patient ID: Driss Hernandez Jr. is a 68 y.o. male    Chief Complaint: Follow-up (back pain)      Referred by: No ref. provider found      HPI:    Interval History (3/18/19):  He returns today for follow up.  He reports that trigger point injections have been helpful for the right-sided low back pain. He reports near complete resolution of the sharp right-sided low back pain. He does report that his previous low back pain starting to return.  He feels as though his current pain is exact same pain that was previously helped by sacroiliac joint injections.  He is interested in repeat injections if appropriate.      Interval History (2/12/19):  He returns today for follow up and imaging review.  He reports that his pain is unchanged since last encounter.  He denies any new or worsening neurologic symptoms.      Interval History (2/7/19):  He returns today for follow up.  He reports that he has been having acute right-sided low back pain for about 1 week.  He denies any specific inciting event or injury.  The pain is located in the lateral aspect of the right lumbosacral region.  It does not radiate.  He denies any associated numbness, tingling, weakness, bowel bladder dysfunction.  The pain varies in intensity from day-to-day.  The pain is much worse with coughing and sneezing and sitting with a forward flexed posture.  He is still able to go to the gym at times.      Interval History (12/17/18):  He returns today for follow up.  He reports that bilateral SIJ injections has been helpful for the low back pain. He reports about 80% relief. He does not feel that he needs any further interventions at this time      Interval History (11/19/18):  He returns today for follow up.  He reports that bilateral lumbar medial branch blocks were not helpful. Pain is unchanged in quality and location since last visit. He denies any new symptoms.       Interval History (10/16/18):  He returns today for follow up.  He  reports that he has continued to have bilateral low back pain. He still has some right lower extremity pain, but this is not as bothersome as his low back pain. The pain is located across the lower lumbar region R>L. It does not radiate. It is not associated with any numbness, tingling, weakness or b/b dysfunction. The pain is worse with activity. He also requests refills of tizanidine.      Interval History (12/18/17):  He returns today for follow up.  He reports that right L4 TFESI has been helpful for the right lumbar radicular pain. He reports 100% relief. He still has some lower back pain, but would prefer to discuss this later. Otherwise he is doing well.       Interval History (11/13/17):  He returns today for follow up.  He reports that PT has been helpful for the low back pain. He still has significant right foot and ankle pain when sitting in a reclined postion. This is the most painful area. He also has low back pain that is constant but does not bother him as much. Otherwise he is doing well.       Driss Hernandez Jr. is a 68 y.o. male who presents today with chronic bilateral low back pain R>>>L. Pain started over 40 years ago. No inciting injury or event noted. Pain is located mainly on the right side of the lower lumbar paraspinals. About 5 weeks ago, patient began to have right lwoer extremity pain that he felt was related to his back pain, but this has subsided. He denies any numbness, tingling, weakness, or b/b dysfunction. The pain is described as dull, but can become sharp at times. Patient states that his pain is worse in the morning. He does not sleep well. States that he wakes up every 45 minutes. Has taken Tizanidine PRN in the pat, but cannot recall how it affected him. Probably has not taken in over a year. Cannot take NSAIDs due to decreased renal function. Has taken in the past with mild relief.  This pain is described in detail below.    Physical Therapy: Has completed course of PT  and continues HEP    Non-pharmacologic Treatment: Nothing really helps         · TENS? No    Pain Medications:         · Currently taking: Gabapentin 500mg per day, Tizandine 16mg QHS. Tylenol p.r.n.    · Has tried in the past:  NSAIDs,     · Has not tried: Opioids, Tylenol, TCAs, SNRIs, topical creams    Blood thinners: ASA 325mg daily    Interventional Therapies:   11/29/17 - Right L4 TFESI - 100% relief  10/28/18 - bilateral SIJ injections -  80% relief  11/14/18 - Bilateral L2, L3, L4 and L5 MBBs - No relief noted    Relevant Surgeries: Previous right L4 hemilaminectomy    Affecting sleep? Yes    Affecting daily activities? yes    Depressive symptoms? no          · SI/HI? No    Work status: Unemployed    Pain Scores:    Best:       0/10  Worst:     10/10  Usually:   3/10  Today:    5/10    Review of Systems   Constitutional: Negative for activity change, appetite change, chills, fatigue, fever and unexpected weight change.   HENT: Negative for hearing loss.    Eyes: Negative for visual disturbance.   Respiratory: Negative for chest tightness and shortness of breath.    Cardiovascular: Negative for chest pain.   Gastrointestinal: Negative for abdominal pain, constipation, diarrhea, nausea and vomiting.   Genitourinary: Negative for difficulty urinating.   Musculoskeletal: Positive for back pain. Negative for gait problem and neck pain.   Skin: Negative for rash.   Neurological: Negative for dizziness, weakness, light-headedness, numbness and headaches.   Psychiatric/Behavioral: Positive for sleep disturbance. Negative for hallucinations and suicidal ideas. The patient is not nervous/anxious.        Past Medical History:   Diagnosis Date    Chronic midline low back pain without sciatica 10/2/2017    Diabetes mellitus with neurological manifestations, uncontrolled 1/24/2017    Diabetic polyneuropathy associated with type 2 diabetes mellitus 1/24/2017    Diabetic polyneuropathy associated with type 2 diabetes  mellitus     Essential hypertension 1/24/2017    Gastroesophageal reflux disease 1/24/2017    Hyperlipidemia 1/24/2017    Insomnia 1/24/2017    Long-term insulin use 1/24/2017    Lumbar spondylosis 11/13/2017    Nuclear sclerosis of both eyes 8/24/2017    Obesity     Stage 3 chronic kidney disease 2/13/2019    Uncontrolled type 2 diabetes mellitus without complication, with long-term current use of insulin 1/24/2017       Past Surgical History:   Procedure Laterality Date    BACK SURGERY      2002    CATARACT EXTRACTION W/  INTRAOCULAR LENS IMPLANT Right 08/29/2017    Dr. Hong    COLONOSCOPY N/A 2/21/2017    [facility]    CORONARY ARTERY BYPASS GRAFT      March 2016    Injection, Steroid, Epidural Bilateral 11/28/2018    Performed [facility] Eze Byrd Jr., [facility] at [facility] END[facility]    INJECTION-STEROID-EPIDURAL-TRANSFORAMINAL Right 11/29/2017    [facility]ze [facility] at [facility] OR    INSERTION-INTRAOCULAR LENS (IOL) Right 8/29/2017    [facility] Nickolas [facility] at [facility]    Lumbar Medial Branch Blocks Bilateral 11/14/2018    Performed by Eze Byrd Jr., MD at [facility]    PHACOEMULSIFICATION-ASPIRATION-CATARACT Right 8/29/2017    Performed [facility] at [facility] OR [facility]    TONSILLECTOMY         Social History     Socioeconomic History    Marital status:      Spouse name: Not on file    Number of children: Not on file    Years of education: Not on file    Highest education level: Not on file   Social Needs    Financial resource strain: Not on file    Food insecurity - worry: Not on file    Food insecurity - inability: Not on file    Transportation needs - medical: Not on file    Transportation needs - non-medical: Not on file   Occupational History    Not on file   Tobacco Use    Smoking status: Never Smoker    Smokeless tobacco: Never Used   Substance and Sexual Activity    Alcohol  "use: No    Drug use: No    Sexual activity: Yes     Partners: Female   Other Topics Concern    Not on file   Social History Narrative    Not on file       Review of patient's allergies indicates:   Allergen Reactions    Penicillins Other (See Comments)     Unknown reaction, Had a reaction as a child    Shrimp Itching     Hand itching       Current Outpatient Medications on File Prior to Visit   Medication Sig Dispense Refill    ACCU-CHEK MOIZ CONTROL SOLN Soln       ACCU-CHEK MOIZ PLUS TEST STRP Strp TEST THREE TIMES DAILY 300 strip 12    ACCU-CHEK SOFTCLIX LANCETS Misc       amLODIPine (NORVASC) 10 MG tablet Take 1 tablet (10 mg total) by mouth once daily. 30 tablet 11    aspirin 325 MG tablet Take 325 mg by mouth every morning.       atorvastatin (LIPITOR) 40 MG tablet Take 1 tablet (40 mg total) by mouth every morning. 90 tablet 3    BD ALCOHOL SWABS PadM       BD INSULIN PEN NEEDLE UF MINI 31 gauge x 3/16" Ndle       BD ULTRA-FINE ROLAND PEN NEEDLES 32 gauge x 5/32" Ndle       fenofibrate 160 MG Tab TAKE 1 TABLET EVERY MORNING 90 tablet 4    gabapentin (NEURONTIN) 100 MG capsule TAKE 2 CAPSULES IN THE MORNING AND 3 CAPSULES WITH DINNER 450 capsule 3    LANTUS SOLOSTAR U-100 INSULIN 100 unit/mL (3 mL) InPn pen INJECT 13 UNITS INTO THE SKIN EVERY MORNING. (Patient taking differently: Inject 20 Units into the skin every morning. ) 15 mL 2    metFORMIN (GLUCOPHAGE-XR) 500 MG 24 hr tablet Take 2 tablets (1,000 mg total) by mouth 2 (two) times daily with meals. Takes two 500 mg twice a day 120 tablet 3    metoprolol succinate (TOPROL-XL) 50 MG 24 hr tablet Take 1 tablet (50 mg total) by mouth daily with dinner or evening meal. 90 tablet 4    nateglinide (STARLIX) 120 MG tablet       omeprazole (PRILOSEC) 20 MG capsule Take 1 capsule (20 mg total) by mouth daily with dinner or evening meal. 90 capsule 3    sildenafil (REVATIO) 20 mg Tab 1-5 pills at a time per day prn 30 tablet 11    tiZANidine " "(ZANAFLEX) 4 MG tablet Take 4 tablets (16 mg total) by mouth every evening. 360 tablet 3    triazolam (HALCION) 0.25 MG Tab TK 1 T PO QHS, prn  5     No current facility-administered medications on file prior to visit.        Objective:      BP (!) 172/80   Pulse 77   Resp 18   Ht 5' 7" (1.702 m)   Wt 89.4 kg (197 lb 3.2 oz)   SpO2 98%   BMI 30.89 kg/m²     Exam:  GEN:  Well developed, well nourished.  No acute distress.   HEENT:  No trauma.  Mucous membranes moist.  Nares patent bilaterally.  PSYCH: Normal affect. Thought content appropriate.  CHEST:  Breathing symmetric.  No audible wheezing.  ABD: Soft, non-distended.  SKIN:  Warm, pink, dry.  No rash on exposed areas.    EXT:  No cyanosis, clubbing, or edema.  No color change or changes in nail or hair growth.  NEURO/MUSCULOSKELETAL:  Fully alert, oriented, and appropriate. Speech normal ivania. No cranial nerve deficits.   Gait:  Normal.  No focal motor deficits.           Imaging:  Narrative     EXAMINATION:  MRI LUMBAR SPINE WITHOUT CONTRAST    CLINICAL HISTORY:  lumbar ddd; Other intervertebral disc degeneration, lumbar region    TECHNIQUE:  Multiplanar, multisequence MR images were acquired from the thoracolumbar junction to the sacrum without the administration of contrast.    COMPARISON:  08/23/2017    FINDINGS:  There is no evidence of fracture or marrow replacement process.  There is disc desiccation at all lumbar levels with disc space narrowing at multiple levels but most significant at L4-5.  Sagittal alignment is maintained.  Conus terminates at T12-L1.  Visualized retroperitoneal structures demonstrate no significant abnormalities.    T12-L1, L1-L2: Mild diffuse disc bulge with no significant central canal stenosis or neural foraminal narrowing.    L2-L3: Mild diffuse disc bulge with moderate facet arthropathy.  No significant central canal stenosis or neural foraminal narrowing.    L3-4: Mild diffuse disc bulge extending into both neural " foramen with moderate facet arthropathy causing moderate right and mild left neural foraminal narrowing.  No significant central canal stenosis.    L4-5: Hemilaminectomy defect on the right.  There is a diffuse disc bulge extending into both neural foramen with a superimposed central extrusion with an annular fissure extending slightly below the level of the disc plane.  This causes mass effect on the lateral recess and may impinge on the descending nerve roots although the canal is decompressed posteriorly by the laminectomy defect with no significant central canal stenosis.  There is severe facet arthropathy contributing to severe bilateral neural foraminal narrowing.    L5-S1: There is severe facet arthropathy.  There is a diffuse disc bulge with no significant central canal stenosis.  There is severe bilateral neural foraminal narrowing..   Impression       Postoperative changes at L4 on the right with decompression of the central canal.  There is lumbar spondylosis most significant at L4-5 and L5-S1 where there is severe bilateral neural foraminal narrowing.  Specific details at each level are discussed above.      Electronically signed by: Quinton Goins MD  Date: 02/12/2019  Time: 10:10         Assessment:       Encounter Diagnoses   Name Primary?    DDD (degenerative disc disease), lumbar Yes    Lumbar spondylosis     Other osteoarthritis of spine, lumbosacral region     Sacroiliac joint pain          Plan:       Driss was seen today for follow-up.    Diagnoses and all orders for this visit:    DDD (degenerative disc disease), lumbar    Lumbar spondylosis    Other osteoarthritis of spine, lumbosacral region    Sacroiliac joint pain        Driss Hernandez  is a 68 y.o. male with chronic bilateral low back pain. Pain is consistent with sacroiliac joint pain. Also with improved acute right-sided low back pain following trigger point injections.    1.  Schedule for bilateral sacroiliac joint steroid  injections under fluoroscopy.  2.  Return to clinic 2 weeks after procedure discuss results.  May consider repeat trigger point injections in the future if needed.

## 2019-03-18 NOTE — H&P (VIEW-ONLY)
Subjective:     Patient ID: Driss Hernandez Jr. is a 68 y.o. male    Chief Complaint: Follow-up (back pain)      Referred by: No ref. provider found      HPI:    Interval History (3/18/19):  He returns today for follow up.  He reports that trigger point injections have been helpful for the right-sided low back pain. He reports near complete resolution of the sharp right-sided low back pain. He does report that his previous low back pain starting to return.  He feels as though his current pain is exact same pain that was previously helped by sacroiliac joint injections.  He is interested in repeat injections if appropriate.      Interval History (2/12/19):  He returns today for follow up and imaging review.  He reports that his pain is unchanged since last encounter.  He denies any new or worsening neurologic symptoms.      Interval History (2/7/19):  He returns today for follow up.  He reports that he has been having acute right-sided low back pain for about 1 week.  He denies any specific inciting event or injury.  The pain is located in the lateral aspect of the right lumbosacral region.  It does not radiate.  He denies any associated numbness, tingling, weakness, bowel bladder dysfunction.  The pain varies in intensity from day-to-day.  The pain is much worse with coughing and sneezing and sitting with a forward flexed posture.  He is still able to go to the gym at times.      Interval History (12/17/18):  He returns today for follow up.  He reports that bilateral SIJ injections has been helpful for the low back pain. He reports about 80% relief. He does not feel that he needs any further interventions at this time      Interval History (11/19/18):  He returns today for follow up.  He reports that bilateral lumbar medial branch blocks were not helpful. Pain is unchanged in quality and location since last visit. He denies any new symptoms.       Interval History (10/16/18):  He returns today for follow up.  He  reports that he has continued to have bilateral low back pain. He still has some right lower extremity pain, but this is not as bothersome as his low back pain. The pain is located across the lower lumbar region R>L. It does not radiate. It is not associated with any numbness, tingling, weakness or b/b dysfunction. The pain is worse with activity. He also requests refills of tizanidine.      Interval History (12/18/17):  He returns today for follow up.  He reports that right L4 TFESI has been helpful for the right lumbar radicular pain. He reports 100% relief. He still has some lower back pain, but would prefer to discuss this later. Otherwise he is doing well.       Interval History (11/13/17):  He returns today for follow up.  He reports that PT has been helpful for the low back pain. He still has significant right foot and ankle pain when sitting in a reclined postion. This is the most painful area. He also has low back pain that is constant but does not bother him as much. Otherwise he is doing well.       Driss Hernandez Jr. is a 68 y.o. male who presents today with chronic bilateral low back pain R>>>L. Pain started over 40 years ago. No inciting injury or event noted. Pain is located mainly on the right side of the lower lumbar paraspinals. About 5 weeks ago, patient began to have right lwoer extremity pain that he felt was related to his back pain, but this has subsided. He denies any numbness, tingling, weakness, or b/b dysfunction. The pain is described as dull, but can become sharp at times. Patient states that his pain is worse in the morning. He does not sleep well. States that he wakes up every 45 minutes. Has taken Tizanidine PRN in the pat, but cannot recall how it affected him. Probably has not taken in over a year. Cannot take NSAIDs due to decreased renal function. Has taken in the past with mild relief.  This pain is described in detail below.    Physical Therapy: Has completed course of PT  and continues HEP    Non-pharmacologic Treatment: Nothing really helps         · TENS? No    Pain Medications:         · Currently taking: Gabapentin 500mg per day, Tizandine 16mg QHS. Tylenol p.r.n.    · Has tried in the past:  NSAIDs,     · Has not tried: Opioids, Tylenol, TCAs, SNRIs, topical creams    Blood thinners: ASA 325mg daily    Interventional Therapies:   11/29/17 - Right L4 TFESI - 100% relief  10/28/18 - bilateral SIJ injections -  80% relief  11/14/18 - Bilateral L2, L3, L4 and L5 MBBs - No relief noted    Relevant Surgeries: Previous right L4 hemilaminectomy    Affecting sleep? Yes    Affecting daily activities? yes    Depressive symptoms? no          · SI/HI? No    Work status: Unemployed    Pain Scores:    Best:       0/10  Worst:     10/10  Usually:   3/10  Today:    5/10    Review of Systems   Constitutional: Negative for activity change, appetite change, chills, fatigue, fever and unexpected weight change.   HENT: Negative for hearing loss.    Eyes: Negative for visual disturbance.   Respiratory: Negative for chest tightness and shortness of breath.    Cardiovascular: Negative for chest pain.   Gastrointestinal: Negative for abdominal pain, constipation, diarrhea, nausea and vomiting.   Genitourinary: Negative for difficulty urinating.   Musculoskeletal: Positive for back pain. Negative for gait problem and neck pain.   Skin: Negative for rash.   Neurological: Negative for dizziness, weakness, light-headedness, numbness and headaches.   Psychiatric/Behavioral: Positive for sleep disturbance. Negative for hallucinations and suicidal ideas. The patient is not nervous/anxious.        Past Medical History:   Diagnosis Date    Chronic midline low back pain without sciatica 10/2/2017    Diabetes mellitus with neurological manifestations, uncontrolled 1/24/2017    Diabetic polyneuropathy associated with type 2 diabetes mellitus 1/24/2017    Diabetic polyneuropathy associated with type 2 diabetes  mellitus     Essential hypertension 1/24/2017    Gastroesophageal reflux disease 1/24/2017    Hyperlipidemia 1/24/2017    Insomnia 1/24/2017    Long-term insulin use 1/24/2017    Lumbar spondylosis 11/13/2017    Nuclear sclerosis of both eyes 8/24/2017    Obesity     Stage 3 chronic kidney disease 2/13/2019    Uncontrolled type 2 diabetes mellitus without complication, with long-term current use of insulin 1/24/2017       Past Surgical History:   Procedure Laterality Date    BACK SURGERY      2002    CATARACT EXTRACTION W/  INTRAOCULAR LENS IMPLANT Right 08/29/2017    Dr. Hong    COLONOSCOPY N/A 2/21/2017    Performed by Robb Rosado [facility] at [facility] ENDO    CORONARY ARTERY BYPASS GRAFT      March 2016    Injection, Steroid, Epidural Bilateral 11/28/2018    Performed by Eze Byrd Jr., MD at [facility] ENDO    INJECTION-STEROID-EPIDURAL-TRANSFORAMINAL Right 11/29/2017    Per[facility] by [facility]ze [facility]rd Jr[facility] at [facility] OR    INSERTION-INTRAOCULAR LENS (IOL) Right 8/29/2017    [facility] Nickolas [facility] at [facility] 1ST FLR    Lumbar Medial Branch Blocks Bilateral 11/14/2018    Performed by Eze Byrd Jr., MD at [facility] ENDO    PHACOEMULSIFICATION-ASPIRATION-CATARACT Right 8/29/2017    Performed by Nickolas [facility] at [facility] OR 1ST FLR    TONSILLECTOMY         Social History     Socioeconomic History    Marital status:      Spouse name: Not on file    Number of children: Not on file    Years of education: Not on file    Highest education level: Not on file   Social Needs    Financial resource strain: Not on file    Food insecurity - worry: Not on file    Food insecurity - inability: Not on file    Transportation needs - medical: Not on file    Transportation needs - non-medical: Not on file   Occupational History    Not on file   Tobacco Use    Smoking status: Never Smoker    Smokeless tobacco: Never Used   Substance and Sexual Activity    Alcohol  "use: No    Drug use: No    Sexual activity: Yes     Partners: Female   Other Topics Concern    Not on file   Social History Narrative    Not on file       Review of patient's allergies indicates:   Allergen Reactions    Penicillins Other (See Comments)     Unknown reaction, Had a reaction as a child    Shrimp Itching     Hand itching       Current Outpatient Medications on File Prior to Visit   Medication Sig Dispense Refill    ACCU-CHEK MOIZ CONTROL SOLN Soln       ACCU-CHEK MOIZ PLUS TEST STRP Strp TEST THREE TIMES DAILY 300 strip 12    ACCU-CHEK SOFTCLIX LANCETS Misc       amLODIPine (NORVASC) 10 MG tablet Take 1 tablet (10 mg total) by mouth once daily. 30 tablet 11    aspirin 325 MG tablet Take 325 mg by mouth every morning.       atorvastatin (LIPITOR) 40 MG tablet Take 1 tablet (40 mg total) by mouth every morning. 90 tablet 3    BD ALCOHOL SWABS PadM       BD INSULIN PEN NEEDLE UF MINI 31 gauge x 3/16" Ndle       BD ULTRA-FINE ROLAND PEN NEEDLES 32 gauge x 5/32" Ndle       fenofibrate 160 MG Tab TAKE 1 TABLET EVERY MORNING 90 tablet 4    gabapentin (NEURONTIN) 100 MG capsule TAKE 2 CAPSULES IN THE MORNING AND 3 CAPSULES WITH DINNER 450 capsule 3    LANTUS SOLOSTAR U-100 INSULIN 100 unit/mL (3 mL) InPn pen INJECT 13 UNITS INTO THE SKIN EVERY MORNING. (Patient taking differently: Inject 20 Units into the skin every morning. ) 15 mL 2    metFORMIN (GLUCOPHAGE-XR) 500 MG 24 hr tablet Take 2 tablets (1,000 mg total) by mouth 2 (two) times daily with meals. Takes two 500 mg twice a day 120 tablet 3    metoprolol succinate (TOPROL-XL) 50 MG 24 hr tablet Take 1 tablet (50 mg total) by mouth daily with dinner or evening meal. 90 tablet 4    nateglinide (STARLIX) 120 MG tablet       omeprazole (PRILOSEC) 20 MG capsule Take 1 capsule (20 mg total) by mouth daily with dinner or evening meal. 90 capsule 3    sildenafil (REVATIO) 20 mg Tab 1-5 pills at a time per day prn 30 tablet 11    tiZANidine " "(ZANAFLEX) 4 MG tablet Take 4 tablets (16 mg total) by mouth every evening. 360 tablet 3    triazolam (HALCION) 0.25 MG Tab TK 1 T PO QHS, prn  5     No current facility-administered medications on file prior to visit.        Objective:      BP (!) 172/80   Pulse 77   Resp 18   Ht 5' 7" (1.702 m)   Wt 89.4 kg (197 lb 3.2 oz)   SpO2 98%   BMI 30.89 kg/m²     Exam:  GEN:  Well developed, well nourished.  No acute distress.   HEENT:  No trauma.  Mucous membranes moist.  Nares patent bilaterally.  PSYCH: Normal affect. Thought content appropriate.  CHEST:  Breathing symmetric.  No audible wheezing.  ABD: Soft, non-distended.  SKIN:  Warm, pink, dry.  No rash on exposed areas.    EXT:  No cyanosis, clubbing, or edema.  No color change or changes in nail or hair growth.  NEURO/MUSCULOSKELETAL:  Fully alert, oriented, and appropriate. Speech normal iavnia. No cranial nerve deficits.   Gait:  Normal.  No focal motor deficits.           Imaging:  Narrative     EXAMINATION:  MRI LUMBAR SPINE WITHOUT CONTRAST    CLINICAL HISTORY:  lumbar ddd; Other intervertebral disc degeneration, lumbar region    TECHNIQUE:  Multiplanar, multisequence MR images were acquired from the thoracolumbar junction to the sacrum without the administration of contrast.    COMPARISON:  08/23/2017    FINDINGS:  There is no evidence of fracture or marrow replacement process.  There is disc desiccation at all lumbar levels with disc space narrowing at multiple levels but most significant at L4-5.  Sagittal alignment is maintained.  Conus terminates at T12-L1.  Visualized retroperitoneal structures demonstrate no significant abnormalities.    T12-L1, L1-L2: Mild diffuse disc bulge with no significant central canal stenosis or neural foraminal narrowing.    L2-L3: Mild diffuse disc bulge with moderate facet arthropathy.  No significant central canal stenosis or neural foraminal narrowing.    L3-4: Mild diffuse disc bulge extending into both neural " foramen with moderate facet arthropathy causing moderate right and mild left neural foraminal narrowing.  No significant central canal stenosis.    L4-5: Hemilaminectomy defect on the right.  There is a diffuse disc bulge extending into both neural foramen with a superimposed central extrusion with an annular fissure extending slightly below the level of the disc plane.  This causes mass effect on the lateral recess and may impinge on the descending nerve roots although the canal is decompressed posteriorly by the laminectomy defect with no significant central canal stenosis.  There is severe facet arthropathy contributing to severe bilateral neural foraminal narrowing.    L5-S1: There is severe facet arthropathy.  There is a diffuse disc bulge with no significant central canal stenosis.  There is severe bilateral neural foraminal narrowing..   Impression       Postoperative changes at L4 on the right with decompression of the central canal.  There is lumbar spondylosis most significant at L4-5 and L5-S1 where there is severe bilateral neural foraminal narrowing.  Specific details at each level are discussed above.      Electronically signed by: Quinton Goins MD  Date: 02/12/2019  Time: 10:10         Assessment:       Encounter Diagnoses   Name Primary?    DDD (degenerative disc disease), lumbar Yes    Lumbar spondylosis     Other osteoarthritis of spine, lumbosacral region     Sacroiliac joint pain          Plan:       Driss was seen today for follow-up.    Diagnoses and all orders for this visit:    DDD (degenerative disc disease), lumbar    Lumbar spondylosis    Other osteoarthritis of spine, lumbosacral region    Sacroiliac joint pain        Driss Hernandez  is a 68 y.o. male with chronic bilateral low back pain. Pain is consistent with sacroiliac joint pain. Also with improved acute right-sided low back pain following trigger point injections.    1.  Schedule for bilateral sacroiliac joint steroid  injections under fluoroscopy.  2.  Return to clinic 2 weeks after procedure discuss results.  May consider repeat trigger point injections in the future if needed.

## 2019-03-18 NOTE — PROGRESS NOTES
Mr. Naqvi will be scheduled for bilateral SIJ Steroid Injections on 03/20/2019.  Reviewed the pre-procedure instructions listed below with him- copy also provided.  Instructed patient to notify clinic if he begins feeling ill, runs a fever, is prescribed antibiotics, and/or has any outpatient procedures within the two weeks leading up to this procedure.  Instructed patient to report to [facility], 2nd Floor Endoscopy Registration Desk.  All questions answered- patient verbalized understanding.  ASA does not need to be held per Dr. Byrd.     Day of Procedure  - Ensure you have obtained an arrival time from the Pain Management Staff  o Procedure Area will call 1-3 days in advance with your arrival time.  Please check any voicemails received.  o If you arrive past your scheduled procedure time, you may be asked to reschedule your procedure.  - Ensure you have a  with you to remain present throughout your procedure for your safety.  o If you arrive without a responsible adult to stay with you and drive you home, you may be asked to reschedule your procedure.  - Take all of your prescribed medications (exceptions noted below) with a small amount of water  - This is NOT a fasting procedure, you may have a light meal before coming.  - Wear comfortable clothing (loose fitting pants).  - You may wear glasses, dentures, contact lenses, and/or hearing aids. Please remove all jewelry and metal hairpins.  - Notify the nurse during the intake process if you are allergic to any medications, if you are diabetic, or if you are not feeling well  - Contact the Pain Management Clinic with the following:  o A fever greater than 100° (degrees)   o Feel ill, have any type of infection, or are taking antibiotics now or within the two (2) weeks leading up to this procedure  o Have any outpatient procedures within the two (2) weeks leading up to this procedure (colonoscopy, major dental work, etc.)    IF you are  taking blood thinners: Only upon receiving clearance and notification from the Pain Management Department  7 Days Prior to Your Procedure:  - Stop taking Plavix/Clopridogrel, Effient/Prasugel, ASA  5 Days Prior to Your Procedure:  - Stop taking Coumadin/Warfarin.  An INR may be drawn prior to your procedure.  If labs are required, you will need to arrive earlier than your scheduled arrival time.  - Stop taking Pradaxa/Dabigatra,  Brilinta/ Ticagrelor  3 Days Prior to Your Procedure:  - Stop taking Xarelto/Rivaroxaban, Eliquis/Apixaban, Aggrenox/Dipyridamole, Reopro/Abciximab

## 2019-03-20 ENCOUNTER — HOSPITAL ENCOUNTER (OUTPATIENT)
Dept: RADIOLOGY | Facility: HOSPITAL | Age: 69
Discharge: HOME | End: 2019-03-20
Attending: PAIN MEDICINE | Admitting: PAIN MEDICINE
Payer: MEDICARE

## 2019-03-20 ENCOUNTER — TELEPHONE (OUTPATIENT)
Dept: PAIN MEDICINE | Facility: CLINIC | Age: 69
End: 2019-03-20

## 2019-03-20 ENCOUNTER — HOSPITAL ENCOUNTER (OUTPATIENT)
Facility: HOSPITAL | Age: 69
Discharge: HOME | End: 2019-03-20
Attending: PAIN MEDICINE | Admitting: PAIN MEDICINE
Payer: MEDICARE

## 2019-03-20 VITALS
SYSTOLIC BLOOD PRESSURE: 141 MMHG | OXYGEN SATURATION: 98 % | TEMPERATURE: 98 F | RESPIRATION RATE: 18 BRPM | DIASTOLIC BLOOD PRESSURE: 74 MMHG | HEART RATE: 71 BPM

## 2019-03-20 DIAGNOSIS — M47.817 LUMBOSACRAL SPONDYLOSIS: ICD-10-CM

## 2019-03-20 DIAGNOSIS — M47.897 OTHER OSTEOARTHRITIS OF SPINE, LUMBOSACRAL REGION: Primary | ICD-10-CM

## 2019-03-20 DIAGNOSIS — M51.36 DDD (DEGENERATIVE DISC DISEASE), LUMBAR: ICD-10-CM

## 2019-03-20 PROCEDURE — 25500020 PHARM REV CODE 255: Mod: HCNC | Performed by: PAIN MEDICINE

## 2019-03-20 PROCEDURE — 27096 INJECT SACROILIAC JOINT: CPT | Mod: 50,HCNC,, | Performed by: PAIN MEDICINE

## 2019-03-20 PROCEDURE — 25000003 PHARM REV CODE 250: Mod: HCNC | Performed by: PAIN MEDICINE

## 2019-03-20 PROCEDURE — 27096 PR INJECTION,SACROILIAC JOINT: ICD-10-PCS | Mod: 50,HCNC,, | Performed by: PAIN MEDICINE

## 2019-03-20 PROCEDURE — 27096 INJECT SACROILIAC JOINT: CPT | Mod: 50,HCNC | Performed by: PAIN MEDICINE

## 2019-03-20 PROCEDURE — 63600175 PHARM REV CODE 636 W HCPCS: Mod: HCNC | Performed by: PAIN MEDICINE

## 2019-03-20 RX ORDER — BUPIVACAINE HYDROCHLORIDE 2.5 MG/ML
10 INJECTION, SOLUTION EPIDURAL; INFILTRATION; INTRACAUDAL ONCE
Status: COMPLETED | OUTPATIENT
Start: 2019-03-20 | End: 2019-03-20

## 2019-03-20 RX ORDER — BUPIVACAINE HYDROCHLORIDE 2.5 MG/ML
INJECTION, SOLUTION EPIDURAL; INFILTRATION; INTRACAUDAL
Status: DISCONTINUED
Start: 2019-03-20 | End: 2019-03-20 | Stop reason: HOSPADM

## 2019-03-20 RX ORDER — ALPRAZOLAM 0.5 MG/1
1 TABLET, ORALLY DISINTEGRATING ORAL ONCE AS NEEDED
Status: COMPLETED | OUTPATIENT
Start: 2019-03-20 | End: 2019-03-20

## 2019-03-20 RX ORDER — TRIAMCINOLONE ACETONIDE 40 MG/ML
40 INJECTION, SUSPENSION INTRA-ARTICULAR; INTRAMUSCULAR ONCE
Status: COMPLETED | OUTPATIENT
Start: 2019-03-20 | End: 2019-03-20

## 2019-03-20 RX ORDER — ALPRAZOLAM 0.5 MG/1
TABLET, ORALLY DISINTEGRATING ORAL
Status: DISCONTINUED
Start: 2019-03-20 | End: 2019-03-20 | Stop reason: HOSPADM

## 2019-03-20 RX ORDER — TRIAMCINOLONE ACETONIDE 40 MG/ML
INJECTION, SUSPENSION INTRA-ARTICULAR; INTRAMUSCULAR
Status: DISCONTINUED
Start: 2019-03-20 | End: 2019-03-20 | Stop reason: HOSPADM

## 2019-03-20 RX ORDER — LIDOCAINE HYDROCHLORIDE 20 MG/ML
10 INJECTION, SOLUTION INFILTRATION; PERINEURAL ONCE
Status: COMPLETED | OUTPATIENT
Start: 2019-03-20 | End: 2019-03-20

## 2019-03-20 RX ORDER — LIDOCAINE HYDROCHLORIDE 20 MG/ML
INJECTION, SOLUTION INFILTRATION; PERINEURAL
Status: DISCONTINUED
Start: 2019-03-20 | End: 2019-03-20 | Stop reason: HOSPADM

## 2019-03-20 RX ADMIN — ALPRAZOLAM 1 MG: 0.5 TABLET, ORALLY DISINTEGRATING ORAL at 12:03

## 2019-03-20 NOTE — OP NOTE
Sacroiliac Joint Injection under Fluoroscopy  Time-out taken to identify patient and procedure side prior to starting the procedure.   I attest that I have reviewed the patient's home medications prior to the procedure and no contraindication have been identified. I  re-evaluated the patient after the patient was positioned for the procedure in the procedure room immediately before the procedural time-out. The vital signs are current and represent the current state of the patient which has not significantly changed since the preprocedure assessment.               Date of Service: 03/20/2019    PCP: Jono Willoughby MD    Referring Physician:                                                   PROCEDURE:  bilateral sacroiliac joint injection under fluoroscopy.    REASON FOR PROCEDURE: bilateral sacroiliac joint Sacroiliac joint pain [M53.3]  Bilateral sacroiliitis [M46.1]    PHYSICIAN: Eze Byrd MD  ASSISTANTS: None    MEDICATIONS INJECTED:  Kenalog 20mg and Bupivacaine 0.25% (1.5ml of bupivicaine per side).    LOCAL ANESTHETIC USED: Xylocaine 2% 9ml with Sodium Bicarbonate 1ml. 3ml per site.  SEDATION MEDICATIONS: None    ESTIMATED BLOOD LOSS:  None.    COMPLICATIONS:  None.    TECHNIQUE:   Laying in the prone position, the patient was prepped and draped in the usual sterile fashion using ChloraPrep and fenestrated drape.  The area was determined under fluoroscopy.  Local Xylocaine was injected by raising a wheel and going down to the periosteum using a 27-gauge hypodermic needle.  The 3.5 inch 22-gauge spinal needle was introduce into the bilateral sacroiliac joint.  Negative pressure applied to confirm no intravascular placement.  Omnipaque was injected to confirm placement and to confirm that there was no vascular runoff.  The medication was then injected slowly.  The patient tolerated the procedure well.    PAIN BEFORE THE PROCEDURE:  0/10.    PAIN AFTER THE PROCEDURE: 0/10.    The patient was monitored  for approximately 30 minutes after the procedure.  Patient was given post procedure and discharge instructions to follow at home.  We will see the patient back in two weeks or the patient may call to inform of status. The patient was discharged in a stable condition

## 2019-03-20 NOTE — INTERVAL H&P NOTE
The patient has been examined and the H&P has been reviewed:    I concur with the findings and no changes have occurred since H&P was written.    Anesthesia/Surgery risks, benefits and alternative options discussed and understood by patient/family.          Active Hospital Problems    Diagnosis  POA    Lumbosacral spondylosis [M47.817]  Yes      Resolved Hospital Problems   No resolved problems to display.

## 2019-03-20 NOTE — DISCHARGE INSTRUCTIONS

## 2019-03-20 NOTE — TELEPHONE ENCOUNTER
Made outbound call to patient to provide reminder for scheduled procedure 03.20.19 w/ arrival time of 11.45a with Dr. Byrd. Patient did not arrive on time for procedure and stated no one called him to provide arrival time.

## 2019-03-26 ENCOUNTER — LAB VISIT (OUTPATIENT)
Dept: LAB | Facility: HOSPITAL | Age: 69
End: 2019-03-26
Attending: INTERNAL MEDICINE
Payer: MEDICARE

## 2019-03-26 DIAGNOSIS — E78.5 HYPERLIPEMIA: Primary | ICD-10-CM

## 2019-03-26 LAB
ANION GAP SERPL CALC-SCNC: 10 MMOL/L (ref 8–16)
BUN SERPL-MCNC: 36 MG/DL (ref 8–23)
CALCIUM SERPL-MCNC: 10.2 MG/DL (ref 8.7–10.5)
CHLORIDE SERPL-SCNC: 104 MMOL/L (ref 95–110)
CHOLEST SERPL-MCNC: 179 MG/DL (ref 120–199)
CHOLEST/HDLC SERPL: 5.3 {RATIO} (ref 2–5)
CO2 SERPL-SCNC: 24 MMOL/L (ref 23–29)
CREAT SERPL-MCNC: 1.4 MG/DL (ref 0.5–1.4)
EST. GFR  (AFRICAN AMERICAN): 58.8 ML/MIN/1.73 M^2
EST. GFR  (NON AFRICAN AMERICAN): 50.9 ML/MIN/1.73 M^2
GLUCOSE SERPL-MCNC: 181 MG/DL (ref 70–110)
HDLC SERPL-MCNC: 34 MG/DL (ref 40–75)
HDLC SERPL: 19 % (ref 20–50)
LDLC SERPL CALC-MCNC: 115.2 MG/DL (ref 63–159)
NONHDLC SERPL-MCNC: 145 MG/DL
POTASSIUM SERPL-SCNC: 5.7 MMOL/L (ref 3.5–5.1)
SODIUM SERPL-SCNC: 138 MMOL/L (ref 136–145)
TRIGL SERPL-MCNC: 149 MG/DL (ref 30–150)

## 2019-03-26 PROCEDURE — 80061 LIPID PANEL: CPT | Mod: HCNC

## 2019-03-26 PROCEDURE — 36415 COLL VENOUS BLD VENIPUNCTURE: CPT | Mod: HCNC,PO

## 2019-03-26 PROCEDURE — 80048 BASIC METABOLIC PNL TOTAL CA: CPT | Mod: HCNC

## 2019-04-03 ENCOUNTER — TELEPHONE (OUTPATIENT)
Dept: NEPHROLOGY | Facility: CLINIC | Age: 69
End: 2019-04-03

## 2019-04-03 NOTE — TELEPHONE ENCOUNTER
----- Message from Judy Varela sent at 4/3/2019  9:17 AM CDT -----  Contact: PT  Needs Advice    Reason for call: Patient is calling to schedule an appointment he received a letter to come in for a follow up.visit        Communication Preference: [phone]    Additional Information:    Pt stated that his PCP informed him that if he was feeling fine he didn't need to schedule with nephrology ,so pt stated that he is doing well at the moment and will call us and schedule when needed

## 2019-05-06 ENCOUNTER — OFFICE VISIT (OUTPATIENT)
Dept: OPHTHALMOLOGY | Facility: CLINIC | Age: 69
End: 2019-05-06
Payer: MEDICARE

## 2019-05-06 DIAGNOSIS — E11.9 DM TYPE 2 WITHOUT RETINOPATHY: ICD-10-CM

## 2019-05-06 DIAGNOSIS — I10 ESSENTIAL HYPERTENSION: ICD-10-CM

## 2019-05-06 DIAGNOSIS — Z96.1 PSEUDOPHAKIA: ICD-10-CM

## 2019-05-06 DIAGNOSIS — H25.12 NUCLEAR SCLEROSIS, LEFT: Primary | ICD-10-CM

## 2019-05-06 LAB
LEFT EYE DM RETINOPATHY: NEGATIVE
RIGHT EYE DM RETINOPATHY: NEGATIVE

## 2019-05-06 PROCEDURE — 92014 COMPRE OPH EXAM EST PT 1/>: CPT | Mod: HCNC,S$GLB,, | Performed by: OPHTHALMOLOGY

## 2019-05-06 PROCEDURE — 99999 PR PBB SHADOW E&M-EST. PATIENT-LVL II: CPT | Mod: PBBFAC,HCNC,, | Performed by: OPHTHALMOLOGY

## 2019-05-06 PROCEDURE — 99999 PR PBB SHADOW E&M-EST. PATIENT-LVL II: ICD-10-PCS | Mod: PBBFAC,HCNC,, | Performed by: OPHTHALMOLOGY

## 2019-05-06 PROCEDURE — 92014 PR EYE EXAM, EST PATIENT,COMPREHESV: ICD-10-PCS | Mod: HCNC,S$GLB,, | Performed by: OPHTHALMOLOGY

## 2019-05-06 NOTE — PROGRESS NOTES
Subjective:       Patient ID: Driss Hernandez Jr. is a 68 y.o. male.    Chief Complaint: Diabetic Eye Exam    HPI     DSL- 4/12/18     67 y/o male is here for routine eye exam. Pt Va is well. Pt has eye   allergies. Denies floaters and flashes. BSL was 135 yesterday afternoon.     Eyemeds  At's OU PRN     Hemoglobin A1C       Date                     Value               Ref Range             Status                02/22/2019               8.0 (H)             4.0 - 5.6 %           Final                  Last edited by Flex Cantu on 5/6/2019  8:10 AM. (History)             Assessment:       1. Nuclear sclerosis, left    2. DM type 2 without retinopathy    3. Essential hypertension    4. Pseudophakia        Plan:       Cataract OS- Not visually significant.     DM-No NPDR OU.  HTN-No retinopathy OU.        Control DM & HTN.  RTC 1 yr.

## 2019-05-08 ENCOUNTER — OFFICE VISIT (OUTPATIENT)
Dept: NEPHROLOGY | Facility: CLINIC | Age: 69
End: 2019-05-08
Payer: MEDICARE

## 2019-05-08 ENCOUNTER — TELEPHONE (OUTPATIENT)
Dept: PAIN MEDICINE | Facility: CLINIC | Age: 69
End: 2019-05-08

## 2019-05-08 VITALS
HEIGHT: 67 IN | HEART RATE: 77 BPM | WEIGHT: 193.56 LBS | DIASTOLIC BLOOD PRESSURE: 70 MMHG | BODY MASS INDEX: 30.38 KG/M2 | SYSTOLIC BLOOD PRESSURE: 130 MMHG | OXYGEN SATURATION: 96 %

## 2019-05-08 DIAGNOSIS — N18.30 CONTROLLED TYPE 2 DIABETES MELLITUS WITH STAGE 3 CHRONIC KIDNEY DISEASE, WITH LONG-TERM CURRENT USE OF INSULIN: Primary | ICD-10-CM

## 2019-05-08 DIAGNOSIS — Z79.4 CONTROLLED TYPE 2 DIABETES MELLITUS WITH STAGE 3 CHRONIC KIDNEY DISEASE, WITH LONG-TERM CURRENT USE OF INSULIN: Primary | ICD-10-CM

## 2019-05-08 DIAGNOSIS — E87.5 HYPERKALEMIA: ICD-10-CM

## 2019-05-08 DIAGNOSIS — E11.22 CONTROLLED TYPE 2 DIABETES MELLITUS WITH STAGE 3 CHRONIC KIDNEY DISEASE, WITH LONG-TERM CURRENT USE OF INSULIN: Primary | ICD-10-CM

## 2019-05-08 PROCEDURE — 3075F SYST BP GE 130 - 139MM HG: CPT | Mod: HCNC,CPTII,S$GLB, | Performed by: INTERNAL MEDICINE

## 2019-05-08 PROCEDURE — 3078F DIAST BP <80 MM HG: CPT | Mod: HCNC,CPTII,S$GLB, | Performed by: INTERNAL MEDICINE

## 2019-05-08 PROCEDURE — 1101F PR PT FALLS ASSESS DOC 0-1 FALLS W/OUT INJ PAST YR: ICD-10-PCS | Mod: HCNC,CPTII,S$GLB, | Performed by: INTERNAL MEDICINE

## 2019-05-08 PROCEDURE — 99214 PR OFFICE/OUTPT VISIT, EST, LEVL IV, 30-39 MIN: ICD-10-PCS | Mod: HCNC,S$GLB,, | Performed by: INTERNAL MEDICINE

## 2019-05-08 PROCEDURE — 3045F PR MOST RECENT HEMOGLOBIN A1C LEVEL 7.0-9.0%: ICD-10-PCS | Mod: HCNC,CPTII,S$GLB, | Performed by: INTERNAL MEDICINE

## 2019-05-08 PROCEDURE — 99999 PR PBB SHADOW E&M-EST. PATIENT-LVL II: CPT | Mod: PBBFAC,HCNC,, | Performed by: INTERNAL MEDICINE

## 2019-05-08 PROCEDURE — 3045F PR MOST RECENT HEMOGLOBIN A1C LEVEL 7.0-9.0%: CPT | Mod: HCNC,CPTII,S$GLB, | Performed by: INTERNAL MEDICINE

## 2019-05-08 PROCEDURE — 1101F PT FALLS ASSESS-DOCD LE1/YR: CPT | Mod: HCNC,CPTII,S$GLB, | Performed by: INTERNAL MEDICINE

## 2019-05-08 PROCEDURE — 99214 OFFICE O/P EST MOD 30 MIN: CPT | Mod: HCNC,S$GLB,, | Performed by: INTERNAL MEDICINE

## 2019-05-08 PROCEDURE — 3078F PR MOST RECENT DIASTOLIC BLOOD PRESSURE < 80 MM HG: ICD-10-PCS | Mod: HCNC,CPTII,S$GLB, | Performed by: INTERNAL MEDICINE

## 2019-05-08 PROCEDURE — 99999 PR PBB SHADOW E&M-EST. PATIENT-LVL II: ICD-10-PCS | Mod: PBBFAC,HCNC,, | Performed by: INTERNAL MEDICINE

## 2019-05-08 PROCEDURE — 3075F PR MOST RECENT SYSTOLIC BLOOD PRESS GE 130-139MM HG: ICD-10-PCS | Mod: HCNC,CPTII,S$GLB, | Performed by: INTERNAL MEDICINE

## 2019-05-08 NOTE — TELEPHONE ENCOUNTER
----- Message from Zakiya Varela sent at 5/8/2019 11:02 AM CDT -----  Contact: Self   Type:  Patient Returning Call    Who Called: Self     Who Left Message for Patient: unknown    Does the patient know what this is regarding?: no     Would the patient rather a call back or a response via [facility]?  Call     Best Call Back Number:[phone]

## 2019-05-15 ENCOUNTER — LAB VISIT (OUTPATIENT)
Dept: LAB | Facility: HOSPITAL | Age: 69
End: 2019-05-15
Attending: INTERNAL MEDICINE
Payer: MEDICARE

## 2019-05-15 DIAGNOSIS — E78.5 HYPERLIPEMIA: ICD-10-CM

## 2019-05-15 LAB
ALBUMIN SERPL BCP-MCNC: 3.9 G/DL (ref 3.5–5.2)
ALP SERPL-CCNC: 36 U/L (ref 55–135)
ALT SERPL W/O P-5'-P-CCNC: 28 U/L (ref 10–44)
ANION GAP SERPL CALC-SCNC: 9 MMOL/L (ref 8–16)
AST SERPL-CCNC: 23 U/L (ref 10–40)
BILIRUB SERPL-MCNC: 0.3 MG/DL (ref 0.1–1)
BUN SERPL-MCNC: 29 MG/DL (ref 8–23)
CALCIUM SERPL-MCNC: 10.6 MG/DL (ref 8.7–10.5)
CHLORIDE SERPL-SCNC: 106 MMOL/L (ref 95–110)
CHOLEST SERPL-MCNC: 139 MG/DL (ref 120–199)
CHOLEST/HDLC SERPL: 6 {RATIO} (ref 2–5)
CO2 SERPL-SCNC: 24 MMOL/L (ref 23–29)
CREAT SERPL-MCNC: 1.4 MG/DL (ref 0.5–1.4)
EST. GFR  (AFRICAN AMERICAN): 58.8 ML/MIN/1.73 M^2
EST. GFR  (NON AFRICAN AMERICAN): 50.9 ML/MIN/1.73 M^2
ESTIMATED AVG GLUCOSE: 194 MG/DL (ref 68–131)
GLUCOSE SERPL-MCNC: 250 MG/DL (ref 70–110)
HBA1C MFR BLD HPLC: 8.4 % (ref 4–5.6)
HDLC SERPL-MCNC: 23 MG/DL (ref 40–75)
HDLC SERPL: 16.5 % (ref 20–50)
LDLC SERPL CALC-MCNC: ABNORMAL MG/DL (ref 63–159)
NONHDLC SERPL-MCNC: 116 MG/DL
POTASSIUM SERPL-SCNC: 5.4 MMOL/L (ref 3.5–5.1)
PROT SERPL-MCNC: 7.3 G/DL (ref 6–8.4)
SODIUM SERPL-SCNC: 139 MMOL/L (ref 136–145)
TRIGL SERPL-MCNC: 458 MG/DL (ref 30–150)

## 2019-05-15 PROCEDURE — 36415 COLL VENOUS BLD VENIPUNCTURE: CPT | Mod: HCNC,PO

## 2019-05-15 PROCEDURE — 83036 HEMOGLOBIN GLYCOSYLATED A1C: CPT | Mod: HCNC

## 2019-05-15 PROCEDURE — 80053 COMPREHEN METABOLIC PANEL: CPT | Mod: HCNC

## 2019-05-15 PROCEDURE — 80061 LIPID PANEL: CPT | Mod: HCNC

## 2019-05-15 NOTE — PROGRESS NOTES
Subjective:       Patient ID: Driss Hernandez Jr. is a 68 y.o. White male who presents for follow up of Chronic Kidney Disease    HPI   Mr. Hernandez is a 68 year old man with medical history of diabetes, hypertension presenting for follow up of chronic kidney disease.   Patient reports blood sugars have been better controlled since last visit.  He reports more adequate daily fluid intake, denies any NSAID use.  He otherwise denies any fever, chest pain, shortness of breath, abdominal pain, diarrhea, dysuria/hematuria.     Review of Systems   Constitutional: Negative for appetite change, fatigue and fever.   Respiratory: Negative for cough and shortness of breath.    Cardiovascular: Negative for chest pain and leg swelling.   Gastrointestinal: Negative for abdominal pain, constipation, diarrhea, nausea and vomiting.   Genitourinary: Negative for dysuria, flank pain, frequency, hematuria and urgency.   Musculoskeletal: Negative for arthralgias, back pain and joint swelling.   Skin: Negative for rash.   Neurological: Negative for dizziness and light-headedness.   All other systems reviewed and are negative.      Objective:      Physical Exam   Constitutional: He appears well-developed and well-nourished.   Cardiovascular: Normal rate and regular rhythm. Exam reveals no gallop and no friction rub.   No murmur heard.  Pulmonary/Chest: Effort normal and breath sounds normal. No respiratory distress. He has no wheezes. He has no rales.   Musculoskeletal: He exhibits no edema.   Neurological: He is alert.   Skin: Skin is warm and dry. No rash noted.   Vitals reviewed.      Assessment:       1. Controlled type 2 diabetes mellitus with stage 3 chronic kidney disease, with long-term current use of insulin    2. Hyperkalemia        Plan:     Mr. Hernandez is a 68 year old man with medical history of diabetes, hypertension presenting for evaluation of chronic kidney disease.  Patient creatinine 1.3mg/dL January 2017, slowly  trending up to 1.5mg/dL in December 2017.  Suspect patient with CKD stage IIIa due to previously uncontrolled diabetes.  Encouraged fluid intake, improved since last visit, will repeat renal panel to trend; obtained urine studies to rule out infectious/glomerular etiology, reviewed renal US 1.18 to rule out obstructive/vascular etiology.  Stressed importance of blood pressure/glycemic control, along with weight loss, to prevent any further progression of kidney disease, patient voiced understanding.   - Hyperkalemia: again discussed low potassium diet, patient previously given handout, lisinopril held, will trend electrolytes    Return to clinic in 4-6 months pending renal panel within 4-6 weeks, then with renal/heme panel, iron/TIBC/ferritin, urinalysis/culture, urine protein/creatinine ratio, PTH/VitD prior to next visit

## 2019-06-03 DIAGNOSIS — K21.9 GASTROESOPHAGEAL REFLUX DISEASE, ESOPHAGITIS PRESENCE NOT SPECIFIED: ICD-10-CM

## 2019-06-03 RX ORDER — METFORMIN HYDROCHLORIDE 500 MG/1
1000 TABLET, EXTENDED RELEASE ORAL 2 TIMES DAILY WITH MEALS
Qty: 120 TABLET | Refills: 3 | Status: SHIPPED | OUTPATIENT
Start: 2019-06-03 | End: 2019-08-26 | Stop reason: SDUPTHER

## 2019-06-03 RX ORDER — OMEPRAZOLE 20 MG/1
20 CAPSULE, DELAYED RELEASE ORAL
Qty: 90 CAPSULE | Refills: 3 | Status: SHIPPED | OUTPATIENT
Start: 2019-06-03 | End: 2020-06-28 | Stop reason: SDUPTHER

## 2019-06-12 ENCOUNTER — LAB VISIT (OUTPATIENT)
Dept: LAB | Facility: HOSPITAL | Age: 69
End: 2019-06-12
Attending: INTERNAL MEDICINE
Payer: MEDICARE

## 2019-06-12 DIAGNOSIS — N18.30 CONTROLLED TYPE 2 DIABETES MELLITUS WITH STAGE 3 CHRONIC KIDNEY DISEASE, WITH LONG-TERM CURRENT USE OF INSULIN: ICD-10-CM

## 2019-06-12 DIAGNOSIS — E11.22 CONTROLLED TYPE 2 DIABETES MELLITUS WITH STAGE 3 CHRONIC KIDNEY DISEASE, WITH LONG-TERM CURRENT USE OF INSULIN: ICD-10-CM

## 2019-06-12 DIAGNOSIS — E87.5 HYPERKALEMIA: ICD-10-CM

## 2019-06-12 DIAGNOSIS — Z79.4 CONTROLLED TYPE 2 DIABETES MELLITUS WITH STAGE 3 CHRONIC KIDNEY DISEASE, WITH LONG-TERM CURRENT USE OF INSULIN: ICD-10-CM

## 2019-06-12 LAB
ALBUMIN SERPL BCP-MCNC: 3.9 G/DL (ref 3.5–5.2)
ANION GAP SERPL CALC-SCNC: 11 MMOL/L (ref 8–16)
BUN SERPL-MCNC: 21 MG/DL (ref 8–23)
CALCIUM SERPL-MCNC: 9.7 MG/DL (ref 8.7–10.5)
CHLORIDE SERPL-SCNC: 107 MMOL/L (ref 95–110)
CO2 SERPL-SCNC: 22 MMOL/L (ref 23–29)
CREAT SERPL-MCNC: 1.3 MG/DL (ref 0.5–1.4)
EST. GFR  (AFRICAN AMERICAN): >60 ML/MIN/1.73 M^2
EST. GFR  (NON AFRICAN AMERICAN): 55.7 ML/MIN/1.73 M^2
GLUCOSE SERPL-MCNC: 150 MG/DL (ref 70–110)
PHOSPHATE SERPL-MCNC: 2.2 MG/DL (ref 2.7–4.5)
POTASSIUM SERPL-SCNC: 4.8 MMOL/L (ref 3.5–5.1)
SODIUM SERPL-SCNC: 140 MMOL/L (ref 136–145)

## 2019-06-12 PROCEDURE — 36415 COLL VENOUS BLD VENIPUNCTURE: CPT | Mod: HCNC,PO

## 2019-06-12 PROCEDURE — 80069 RENAL FUNCTION PANEL: CPT | Mod: HCNC

## 2019-07-10 ENCOUNTER — PATIENT MESSAGE (OUTPATIENT)
Dept: FAMILY MEDICINE | Facility: CLINIC | Age: 69
End: 2019-07-10

## 2019-07-10 RX ORDER — TRIAZOLAM 0.25 MG/1
TABLET ORAL
Refills: 5 | OUTPATIENT
Start: 2019-07-10

## 2019-07-15 RX ORDER — ATORVASTATIN CALCIUM 40 MG/1
40 TABLET, FILM COATED ORAL EVERY MORNING
Qty: 90 TABLET | Refills: 12 | Status: SHIPPED | OUTPATIENT
Start: 2019-07-15 | End: 2020-07-20

## 2019-07-17 ENCOUNTER — PATIENT OUTREACH (OUTPATIENT)
Dept: ADMINISTRATIVE | Facility: HOSPITAL | Age: 69
End: 2019-07-17

## 2019-07-31 ENCOUNTER — OFFICE VISIT (OUTPATIENT)
Dept: FAMILY MEDICINE | Facility: CLINIC | Age: 69
End: 2019-07-31
Payer: MEDICARE

## 2019-07-31 VITALS
HEART RATE: 64 BPM | OXYGEN SATURATION: 97 % | WEIGHT: 200.63 LBS | DIASTOLIC BLOOD PRESSURE: 78 MMHG | HEIGHT: 67 IN | SYSTOLIC BLOOD PRESSURE: 132 MMHG | TEMPERATURE: 99 F | BODY MASS INDEX: 31.49 KG/M2

## 2019-07-31 DIAGNOSIS — N18.30 STAGE 3 CHRONIC KIDNEY DISEASE: ICD-10-CM

## 2019-07-31 DIAGNOSIS — I10 ESSENTIAL HYPERTENSION: Primary | ICD-10-CM

## 2019-07-31 DIAGNOSIS — E78.2 MIXED HYPERLIPIDEMIA: ICD-10-CM

## 2019-07-31 DIAGNOSIS — Z79.4 LONG-TERM INSULIN USE: ICD-10-CM

## 2019-07-31 PROCEDURE — 3078F DIAST BP <80 MM HG: CPT | Mod: HCNC,CPTII,S$GLB, | Performed by: INTERNAL MEDICINE

## 2019-07-31 PROCEDURE — 99214 OFFICE O/P EST MOD 30 MIN: CPT | Mod: HCNC,S$GLB,, | Performed by: INTERNAL MEDICINE

## 2019-07-31 PROCEDURE — 1101F PR PT FALLS ASSESS DOC 0-1 FALLS W/OUT INJ PAST YR: ICD-10-PCS | Mod: HCNC,CPTII,S$GLB, | Performed by: INTERNAL MEDICINE

## 2019-07-31 PROCEDURE — 99999 PR PBB SHADOW E&M-EST. PATIENT-LVL IV: ICD-10-PCS | Mod: PBBFAC,HCNC,, | Performed by: INTERNAL MEDICINE

## 2019-07-31 PROCEDURE — 99999 PR PBB SHADOW E&M-EST. PATIENT-LVL IV: CPT | Mod: PBBFAC,HCNC,, | Performed by: INTERNAL MEDICINE

## 2019-07-31 PROCEDURE — 3045F PR MOST RECENT HEMOGLOBIN A1C LEVEL 7.0-9.0%: CPT | Mod: HCNC,CPTII,S$GLB, | Performed by: INTERNAL MEDICINE

## 2019-07-31 PROCEDURE — 3078F PR MOST RECENT DIASTOLIC BLOOD PRESSURE < 80 MM HG: ICD-10-PCS | Mod: HCNC,CPTII,S$GLB, | Performed by: INTERNAL MEDICINE

## 2019-07-31 PROCEDURE — 3075F SYST BP GE 130 - 139MM HG: CPT | Mod: HCNC,CPTII,S$GLB, | Performed by: INTERNAL MEDICINE

## 2019-07-31 PROCEDURE — 3045F PR MOST RECENT HEMOGLOBIN A1C LEVEL 7.0-9.0%: ICD-10-PCS | Mod: HCNC,CPTII,S$GLB, | Performed by: INTERNAL MEDICINE

## 2019-07-31 PROCEDURE — 99214 PR OFFICE/OUTPT VISIT, EST, LEVL IV, 30-39 MIN: ICD-10-PCS | Mod: HCNC,S$GLB,, | Performed by: INTERNAL MEDICINE

## 2019-07-31 PROCEDURE — 3075F PR MOST RECENT SYSTOLIC BLOOD PRESS GE 130-139MM HG: ICD-10-PCS | Mod: HCNC,CPTII,S$GLB, | Performed by: INTERNAL MEDICINE

## 2019-07-31 PROCEDURE — 1101F PT FALLS ASSESS-DOCD LE1/YR: CPT | Mod: HCNC,CPTII,S$GLB, | Performed by: INTERNAL MEDICINE

## 2019-07-31 RX ORDER — MAGNESIUM 250 MG
1 TABLET ORAL DAILY
COMMUNITY
Start: 2019-07-01

## 2019-07-31 RX ORDER — OMEGA-3-ACID ETHYL ESTERS 1 G/1
2 CAPSULE, LIQUID FILLED ORAL 2 TIMES DAILY
Refills: 3 | COMMUNITY
Start: 2019-06-05 | End: 2021-05-26 | Stop reason: SDUPTHER

## 2019-07-31 RX ORDER — ERGOCALCIFEROL 1.25 MG/1
CAPSULE ORAL
Refills: 3 | COMMUNITY
Start: 2019-07-05 | End: 2020-09-28 | Stop reason: SDUPTHER

## 2019-07-31 RX ORDER — TRIAZOLAM 0.25 MG/1
TABLET ORAL
Qty: 15 TABLET | Refills: 5 | Status: CANCELLED | OUTPATIENT
Start: 2019-07-31

## 2019-07-31 NOTE — PROGRESS NOTES
Chief complaint:   discuss issues, follow-up on blood pressure    68-year-old white male with hypertension.  Blood pressure running normal at office visits.  He checks it at the gym before exercise and sometimes higher.  I encouraged him to get his home monitor and also check his blood pressure after exercise and it should be lower.  We do not have any information which would causes to adjust medicines at this time.    Regarding diabetes he is managed by outside Endocrine.  His A1c has been uncontrolled chronically.  We discussed diet improvement.  He has chosen to use regular insulin due to cost.  We discussed that it may well need to be taken 30 min before the meal and could last for hours and he was not aware of that.  He can discuss in greater detail with his endocrinologist.  Continues on Lantus 20.    Apparently on a weekly injection and was discontinued due to the high triglycerides but I pointed out that is triglyceride elevation has been something that has fluctuated over the years and more so relates to diet and his uncontrolled diabetes.  He is now on fish oil as well as another pill for the high triglycerides.  The official may have been added lately.  He is tolerating it with only minimal reflux.  He will need reassessment in a couple of months.    Still followed by endocrinology, had some medication adjustments lately, diabetes still somewhat a uncontrolled.      Patient counseled at length regarding the multitude of all these issues above and it was therapeutic for him to go over all these issues.Total time over 25 minutes with over 50% counseling.    We also reviewed that he is following up for pain management for what sounds like trial of facet injections and possible nerve ablation.  He away and get some significant back pain.  He is off aspirin prior to this procedure.        ROS:   CONST: weight stable.  No further GI symptoms except as above      Past medical history:  1.  Uncontrolled diabetes  "with neuropathy, followed by Dr. Agrawal  2.  Coronary artery disease with triple bypass 2016, followed by the heart clinic. Now [facility], neg PET   3.  Back surgery .  5.  Hyperlipidemia.  6.  Hypertension.  7.  Never had colonoscopy  8.  Pneumovax and Prevnar given  according to records  9.  Right leg radiculopathy, confirmed by MRI, did respond epidural with [facility] pain management  10. Partial small-bowel obstruction 2018    Family history: Father  at 77 with stroke.  Mother  at 72 with heart problems, diabetes, hypertension.  2 sisters living in their 80s and one  at 82.  One brother  at 80 with some kidney disease and diabetes.  Sisters with diabetes, hypertension and stroke.  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  He drinks alcohol about 3 times.  Never smoked.      Vital signs as above     No edema, exam otherwise deferred    Driss was seen today for medication refill.    Diagnoses and all orders for this visit:    Essential hypertension, appears to be chronic and stable but he will confirm overall good control by monitoring more frequently    Stage 3 chronic kidney disease, chronic and stable    Uncontrolled type 2 diabetes mellitus with diabetic neuropathy, with long-term current use of insulin, still uncontrolled, insulin issues discussed, he will follow up with Endocrine and they can clarify whether not there is any contraindication to the weekly medication as it pertains to the triglycerides, I am not aware of any issues    Mixed hyperlipidemia, needs eventual reassessment on fish oil    Long-term insulin use    Other orders  -     Cancel: triazolam (HALCION) 0.25 MG Tab; TK 1/2 TABLET PO QHS, prn               Clinical note be sensitive based upon his anxiety referable to these issues.  "This note will not be shared with the patient."  "

## 2019-08-05 ENCOUNTER — PATIENT MESSAGE (OUTPATIENT)
Dept: FAMILY MEDICINE | Facility: CLINIC | Age: 69
End: 2019-08-05

## 2019-08-12 RX ORDER — TRIAZOLAM 0.25 MG/1
TABLET ORAL
Qty: 30 TABLET | Refills: 3 | Status: SHIPPED | OUTPATIENT
Start: 2019-08-12 | End: 2020-08-18 | Stop reason: SDUPTHER

## 2019-08-24 ENCOUNTER — PATIENT MESSAGE (OUTPATIENT)
Dept: FAMILY MEDICINE | Facility: CLINIC | Age: 69
End: 2019-08-24

## 2019-08-26 RX ORDER — METFORMIN HYDROCHLORIDE 500 MG/1
1000 TABLET, EXTENDED RELEASE ORAL 2 TIMES DAILY WITH MEALS
Qty: 120 TABLET | Refills: 12 | Status: SHIPPED | OUTPATIENT
Start: 2019-08-26 | End: 2020-09-13

## 2019-08-29 LAB
CHOL/HDLC RATIO: 4.7
CHOLESTEROL, TOTAL: 127
HBA1C MFR BLD: 7.7 %
HDLC SERPL-MCNC: 27 MG/DL
LDLC SERPL CALC-MCNC: 72 MG/DL
NONHDLC SERPL-MCNC: 100 MG/DL
TRIGL SERPL-MCNC: 188 MG/DL

## 2019-10-18 DIAGNOSIS — E11.9 TYPE 2 DIABETES MELLITUS WITHOUT COMPLICATION: ICD-10-CM

## 2019-11-06 ENCOUNTER — TELEPHONE (OUTPATIENT)
Dept: NEPHROLOGY | Facility: CLINIC | Age: 69
End: 2019-11-06

## 2019-11-06 NOTE — TELEPHONE ENCOUNTER
----- Message from Adria Cortés sent at 11/6/2019 12:53 PM CST -----  Contact: FREDY ROLLE JR. [[id number]]  Name of Who is Calling: FREDY ROLLE JR. [[id number]]      What is the request in detail: Patient would like to speak with staff in regards to scheduling a 6 mth f/u. No availability for . Please advise      Can the clinic reply by [facility]: no      What Number to Call Back if not in [facility]: [phone]    Called pt no answer l/m

## 2019-11-07 ENCOUNTER — TELEPHONE (OUTPATIENT)
Dept: NEPHROLOGY | Facility: CLINIC | Age: 69
End: 2019-11-07

## 2019-11-07 NOTE — TELEPHONE ENCOUNTER
----- Message from Yoly Cavzaos sent at 11/7/2019  8:43 AM CST -----  Contact:   Pt  [phone]  Pt  Received an letter to schedule an appt     Spoke to pt scheduled appt

## 2019-11-15 ENCOUNTER — OFFICE VISIT (OUTPATIENT)
Dept: FAMILY MEDICINE | Facility: CLINIC | Age: 69
End: 2019-11-15
Payer: MEDICARE

## 2019-11-15 ENCOUNTER — PATIENT OUTREACH (OUTPATIENT)
Dept: ADMINISTRATIVE | Facility: HOSPITAL | Age: 69
End: 2019-11-15

## 2019-11-15 VITALS
HEART RATE: 76 BPM | TEMPERATURE: 98 F | SYSTOLIC BLOOD PRESSURE: 138 MMHG | OXYGEN SATURATION: 96 % | HEIGHT: 67 IN | BODY MASS INDEX: 31.15 KG/M2 | DIASTOLIC BLOOD PRESSURE: 70 MMHG | WEIGHT: 198.44 LBS

## 2019-11-15 DIAGNOSIS — L29.9 EAR ITCHING: ICD-10-CM

## 2019-11-15 DIAGNOSIS — Z79.4 DIABETES MELLITUS DUE TO UNDERLYING CONDITION WITH STAGE 3 CHRONIC KIDNEY DISEASE, WITH LONG-TERM CURRENT USE OF INSULIN: ICD-10-CM

## 2019-11-15 DIAGNOSIS — R79.89 ABNORMAL TSH: ICD-10-CM

## 2019-11-15 DIAGNOSIS — Z79.4 LONG-TERM INSULIN USE: ICD-10-CM

## 2019-11-15 DIAGNOSIS — I10 ESSENTIAL HYPERTENSION: ICD-10-CM

## 2019-11-15 DIAGNOSIS — N18.30 STAGE 3 CHRONIC KIDNEY DISEASE: ICD-10-CM

## 2019-11-15 DIAGNOSIS — N18.30 DIABETES MELLITUS DUE TO UNDERLYING CONDITION WITH STAGE 3 CHRONIC KIDNEY DISEASE, WITH LONG-TERM CURRENT USE OF INSULIN: ICD-10-CM

## 2019-11-15 DIAGNOSIS — E08.22 DIABETES MELLITUS DUE TO UNDERLYING CONDITION WITH STAGE 3 CHRONIC KIDNEY DISEASE, WITH LONG-TERM CURRENT USE OF INSULIN: ICD-10-CM

## 2019-11-15 DIAGNOSIS — H91.90 HEARING LOSS, UNSPECIFIED HEARING LOSS TYPE, UNSPECIFIED LATERALITY: ICD-10-CM

## 2019-11-15 DIAGNOSIS — E11.42 DIABETIC POLYNEUROPATHY ASSOCIATED WITH TYPE 2 DIABETES MELLITUS: ICD-10-CM

## 2019-11-15 PROCEDURE — G0008 FLU VACCINE - HIGH DOSE (65+) PRESERVATIVE FREE IM: ICD-10-PCS | Mod: HCNC,S$GLB,, | Performed by: INTERNAL MEDICINE

## 2019-11-15 PROCEDURE — 90662 IIV NO PRSV INCREASED AG IM: CPT | Mod: HCNC,S$GLB,, | Performed by: INTERNAL MEDICINE

## 2019-11-15 PROCEDURE — 99999 PR PBB SHADOW E&M-EST. PATIENT-LVL III: CPT | Mod: PBBFAC,HCNC,, | Performed by: INTERNAL MEDICINE

## 2019-11-15 PROCEDURE — 3075F PR MOST RECENT SYSTOLIC BLOOD PRESS GE 130-139MM HG: ICD-10-PCS | Mod: HCNC,CPTII,S$GLB, | Performed by: INTERNAL MEDICINE

## 2019-11-15 PROCEDURE — 1101F PR PT FALLS ASSESS DOC 0-1 FALLS W/OUT INJ PAST YR: ICD-10-PCS | Mod: HCNC,CPTII,S$GLB, | Performed by: INTERNAL MEDICINE

## 2019-11-15 PROCEDURE — 3078F PR MOST RECENT DIASTOLIC BLOOD PRESSURE < 80 MM HG: ICD-10-PCS | Mod: HCNC,CPTII,S$GLB, | Performed by: INTERNAL MEDICINE

## 2019-11-15 PROCEDURE — 99499 RISK ADDL DX/OHS AUDIT: ICD-10-PCS | Mod: HCNC,S$GLB,, | Performed by: INTERNAL MEDICINE

## 2019-11-15 PROCEDURE — 99499 UNLISTED E&M SERVICE: CPT | Mod: HCNC,S$GLB,, | Performed by: INTERNAL MEDICINE

## 2019-11-15 PROCEDURE — 99999 PR PBB SHADOW E&M-EST. PATIENT-LVL III: ICD-10-PCS | Mod: PBBFAC,HCNC,, | Performed by: INTERNAL MEDICINE

## 2019-11-15 PROCEDURE — G0008 ADMIN INFLUENZA VIRUS VAC: HCPCS | Mod: HCNC,S$GLB,, | Performed by: INTERNAL MEDICINE

## 2019-11-15 PROCEDURE — 3078F DIAST BP <80 MM HG: CPT | Mod: HCNC,CPTII,S$GLB, | Performed by: INTERNAL MEDICINE

## 2019-11-15 PROCEDURE — 99214 OFFICE O/P EST MOD 30 MIN: CPT | Mod: HCNC,25,S$GLB, | Performed by: INTERNAL MEDICINE

## 2019-11-15 PROCEDURE — 1101F PT FALLS ASSESS-DOCD LE1/YR: CPT | Mod: HCNC,CPTII,S$GLB, | Performed by: INTERNAL MEDICINE

## 2019-11-15 PROCEDURE — 3075F SYST BP GE 130 - 139MM HG: CPT | Mod: HCNC,CPTII,S$GLB, | Performed by: INTERNAL MEDICINE

## 2019-11-15 PROCEDURE — 90662 FLU VACCINE - HIGH DOSE (65+) PRESERVATIVE FREE IM: ICD-10-PCS | Mod: HCNC,S$GLB,, | Performed by: INTERNAL MEDICINE

## 2019-11-15 PROCEDURE — 99214 PR OFFICE/OUTPT VISIT, EST, LEVL IV, 30-39 MIN: ICD-10-PCS | Mod: HCNC,25,S$GLB, | Performed by: INTERNAL MEDICINE

## 2019-11-15 RX ORDER — TIZANIDINE 4 MG/1
4 TABLET ORAL EVERY 6 HOURS PRN
COMMUNITY
End: 2023-07-14 | Stop reason: SDUPTHER

## 2019-11-15 NOTE — PROGRESS NOTES
Chief complaint:   discuss issues, follow-up on blood pressure and thyroid    69-year-old white male with hypertension.  Blood pressure running normal at office visits.  He checks it at the gym before exercise and sometimes higher.  I encouraged him to get his home monitor and also check his blood pressure after exercise and it should be lower.  We do not have any information which would causes to adjust medicines at this time.    Regarding diabetes he is managed by outside Endocrine.  His A1c has been uncontrolled chronically -8.4 in may.  He brings an outside records and his A1c improved to 7.7 as of the end of August..  We discussed diet improvement.  He has chosen to use regular insulin due to cost.  We discussed that it may well need to be taken 30 min before the meal and could last for hours and he was not aware of that.  He can discuss in greater detail with his endocrinologist.  Continues on Lantus 20.  He is also on a weekly injection.  He was thinking about stopping it but encouraged him to do so as it may well be helping with his appetite over eating.    Apparently on a weekly injection and was discontinued due to the high triglycerides but I pointed out that is triglyceride elevation has been something that has fluctuated over the years and more so relates to diet and his uncontrolled diabetes.  He is now on fish oil as well as another pill for the high triglycerides.  The official may have been added lately.  He is tolerating it with  continued reflux and we discussed trying a different preparation.  He has already put them in the freezer..  He will need reassessment in a couple of months.    The other issue is an elevated TSH.  It looks like his TSH went up into the seven range and then a repeat in October was down to 5.2.  I explained hypothyroidism to him at length.  He had a daughter who developed at age 17.  We discussed that it will not be a significant health issue for him to remain hypothyroid  until he figures this out.  He would prefer not to take medication and it does look like the TSH is trending down it did this in the past as well.  He will have an appointment early January with labs with his Endocrine and I agree with that plan.      Patient counseled at length regarding the multitude of all these issues above and it was therapeutic for him to go over all these issues.Total time over 25 minutes with over 50% counseling.    He did have some left sharp neck pain and reassured him that is not a sign of a stroke as his father had a stroke so he was anxious about that    Patient also has some reduced hearing and some itching in the ears any request a referral to ENT.    ROS:   CONST: weight stable.  No further GI symptoms except as above      Past medical history:  1.  Uncontrolled diabetes with neuropathy, followed by Dr. Agrawal  2.  Coronary artery disease with triple bypass 2016, followed by the heart clinic. Now [facility], neg PET   3.  Back surgery .  5.  Hyperlipidemia.  6.  Hypertension.  7.  Never had colonoscopy  8.  Pneumovax and Prevnar given  according to records  9.  Right leg radiculopathy, confirmed by MRI, did respond epidural with [facility] pain management  10. Partial small-bowel obstruction 2018  11.  Abnormal TSH on occasion    Family history: Father  at 77 with stroke.  Mother  at 72 with heart problems, diabetes, hypertension.  2 sisters living in their 80s and one  at 82.  One brother  at 80 with some kidney disease and diabetes.  Sisters with diabetes, hypertension and stroke.  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  He drinks alcohol about 3 times.  Never smoked.      Vital signs as above, exam otherwise deferred    Driss was seen today for diabetes and thyroid problem.    Diagnoses and all orders for this visit:    Uncontrolled  "type 2 diabetes mellitus with diabetic neuropathy, with long-term current use of insulin, improvement to 7.7, keep follow-up with endocrine and recommend continuing all medication outlined by Endocrine    Stage 3 chronic kidney disease, chronic and stable and followed by Nephrology    Essential hypertension, chronic and stable    Diabetic polyneuropathy associated with type 2 diabetes mellitus    Long-term insulin use    Diabetes mellitus with neurological manifestations, uncontrolled    Diabetes mellitus due to underlying condition with stage 3 chronic kidney disease, with long-term current use of insulin    Hearing loss, unspecified hearing loss type, unspecified laterality  -     Ambulatory referral to ENT    Ear itching  -     Ambulatory referral to ENT    Abnormal TSH, agree with following trend for now    Other orders  -     Influenza - High Dose (65+) (PF) (IM)                 Clinical note be sensitive based upon his anxiety referable to these issues.  "This note will not be shared with the patient."  "

## 2019-11-20 ENCOUNTER — OFFICE VISIT (OUTPATIENT)
Dept: NEPHROLOGY | Facility: CLINIC | Age: 69
End: 2019-11-20
Payer: MEDICARE

## 2019-11-20 VITALS
WEIGHT: 199.06 LBS | BODY MASS INDEX: 31.24 KG/M2 | DIASTOLIC BLOOD PRESSURE: 62 MMHG | HEIGHT: 67 IN | SYSTOLIC BLOOD PRESSURE: 120 MMHG

## 2019-11-20 DIAGNOSIS — E11.22 CONTROLLED TYPE 2 DIABETES MELLITUS WITH STAGE 3 CHRONIC KIDNEY DISEASE, WITH LONG-TERM CURRENT USE OF INSULIN: Primary | ICD-10-CM

## 2019-11-20 DIAGNOSIS — N18.30 CONTROLLED TYPE 2 DIABETES MELLITUS WITH STAGE 3 CHRONIC KIDNEY DISEASE, WITH LONG-TERM CURRENT USE OF INSULIN: Primary | ICD-10-CM

## 2019-11-20 DIAGNOSIS — Z79.4 CONTROLLED TYPE 2 DIABETES MELLITUS WITH STAGE 3 CHRONIC KIDNEY DISEASE, WITH LONG-TERM CURRENT USE OF INSULIN: Primary | ICD-10-CM

## 2019-11-20 DIAGNOSIS — E87.5 HYPERKALEMIA: ICD-10-CM

## 2019-11-20 PROCEDURE — 3074F SYST BP LT 130 MM HG: CPT | Mod: HCNC,CPTII,S$GLB, | Performed by: INTERNAL MEDICINE

## 2019-11-20 PROCEDURE — 1101F PR PT FALLS ASSESS DOC 0-1 FALLS W/OUT INJ PAST YR: ICD-10-PCS | Mod: HCNC,CPTII,S$GLB, | Performed by: INTERNAL MEDICINE

## 2019-11-20 PROCEDURE — 3074F PR MOST RECENT SYSTOLIC BLOOD PRESSURE < 130 MM HG: ICD-10-PCS | Mod: HCNC,CPTII,S$GLB, | Performed by: INTERNAL MEDICINE

## 2019-11-20 PROCEDURE — 1126F PR PAIN SEVERITY QUANTIFIED, NO PAIN PRESENT: ICD-10-PCS | Mod: HCNC,S$GLB,, | Performed by: INTERNAL MEDICINE

## 2019-11-20 PROCEDURE — 99999 PR PBB SHADOW E&M-EST. PATIENT-LVL III: CPT | Mod: PBBFAC,HCNC,, | Performed by: INTERNAL MEDICINE

## 2019-11-20 PROCEDURE — 1159F PR MEDICATION LIST DOCUMENTED IN MEDICAL RECORD: ICD-10-PCS | Mod: HCNC,S$GLB,, | Performed by: INTERNAL MEDICINE

## 2019-11-20 PROCEDURE — 3078F DIAST BP <80 MM HG: CPT | Mod: HCNC,CPTII,S$GLB, | Performed by: INTERNAL MEDICINE

## 2019-11-20 PROCEDURE — 1126F AMNT PAIN NOTED NONE PRSNT: CPT | Mod: HCNC,S$GLB,, | Performed by: INTERNAL MEDICINE

## 2019-11-20 PROCEDURE — 1101F PT FALLS ASSESS-DOCD LE1/YR: CPT | Mod: HCNC,CPTII,S$GLB, | Performed by: INTERNAL MEDICINE

## 2019-11-20 PROCEDURE — 99213 OFFICE O/P EST LOW 20 MIN: CPT | Mod: HCNC,S$GLB,, | Performed by: INTERNAL MEDICINE

## 2019-11-20 PROCEDURE — 99999 PR PBB SHADOW E&M-EST. PATIENT-LVL III: ICD-10-PCS | Mod: PBBFAC,HCNC,, | Performed by: INTERNAL MEDICINE

## 2019-11-20 PROCEDURE — 99213 PR OFFICE/OUTPT VISIT, EST, LEVL III, 20-29 MIN: ICD-10-PCS | Mod: HCNC,S$GLB,, | Performed by: INTERNAL MEDICINE

## 2019-11-20 PROCEDURE — 3078F PR MOST RECENT DIASTOLIC BLOOD PRESSURE < 80 MM HG: ICD-10-PCS | Mod: HCNC,CPTII,S$GLB, | Performed by: INTERNAL MEDICINE

## 2019-11-20 PROCEDURE — 1159F MED LIST DOCD IN RCRD: CPT | Mod: HCNC,S$GLB,, | Performed by: INTERNAL MEDICINE

## 2019-11-20 NOTE — PROGRESS NOTES
Subjective:       Patient ID: Driss Hernandez Jr. is a 69 y.o. White male who presents for follow up of Chronic Kidney Disease    HPI   Mr. Hernandez is a 69 year old man with medical history of diabetes, hypertension presenting for follow up of chronic kidney disease.   Patient reports blood sugars have been better controlled since last visit.  He reports more adequate daily fluid intake, denies any NSAID use.  He otherwise denies any fever, chest pain, shortness of breath, abdominal pain, diarrhea, dysuria/hematuria.     Review of Systems   Constitutional: Negative for appetite change, fatigue and fever.   Respiratory: Negative for cough and shortness of breath.    Cardiovascular: Negative for chest pain and leg swelling.   Gastrointestinal: Negative for abdominal pain, constipation, diarrhea, nausea and vomiting.   Genitourinary: Negative for dysuria, flank pain, frequency, hematuria and urgency.   Musculoskeletal: Negative for arthralgias, back pain and joint swelling.   Skin: Negative for rash.   Neurological: Negative for dizziness and light-headedness.   All other systems reviewed and are negative.      Objective:      Physical Exam   Constitutional: He appears well-developed and well-nourished.   Cardiovascular: Normal rate and regular rhythm. Exam reveals no gallop and no friction rub.   No murmur heard.  Pulmonary/Chest: Effort normal and breath sounds normal. No respiratory distress. He has no wheezes. He has no rales.   Musculoskeletal: He exhibits no edema.   Neurological: He is alert.   Skin: Skin is warm and dry. No rash noted.   Vitals reviewed.      Assessment:       1. Controlled type 2 diabetes mellitus with stage 3 chronic kidney disease, with long-term current use of insulin    2. Hyperkalemia        Plan:     Mr. Hernandez is a 69 year old man with medical history of diabetes, hypertension presenting for evaluation of chronic kidney disease.  Patient creatinine 1.3mg/dL January 2017, slowly  trending up to 1.5mg/dL in December 2017.  Suspect patient with CKD stage IIIa due to previously uncontrolled diabetes.  Encouraged fluid intake, improved since last visit (1.05mg/dL August 2019), will repeat renal panel to trend; obtained urine studies to rule out infectious/glomerular etiology, reviewed renal US 1.18 to rule out obstructive/vascular etiology.  Stressed importance of blood pressure/glycemic control, along with weight loss, to prevent any further progression of kidney disease, patient voiced understanding.   - Hyperkalemia: improved, again discussed low potassium diet, patient previously given handout, lisinopril held, will trend electrolytes    Return to clinic in 8-12 months with renal/heme panel, iron/TIBC/ferritin, urinalysis/culture, urine protein/creatinine ratio, PTH/VitD prior to next visit

## 2019-11-22 ENCOUNTER — PES CALL (OUTPATIENT)
Dept: ADMINISTRATIVE | Facility: CLINIC | Age: 69
End: 2019-11-22

## 2019-11-22 DIAGNOSIS — E11.22 CONTROLLED TYPE 2 DIABETES MELLITUS WITH STAGE 3 CHRONIC KIDNEY DISEASE, WITH LONG-TERM CURRENT USE OF INSULIN: ICD-10-CM

## 2019-11-22 DIAGNOSIS — N18.30 CONTROLLED TYPE 2 DIABETES MELLITUS WITH STAGE 3 CHRONIC KIDNEY DISEASE, WITH LONG-TERM CURRENT USE OF INSULIN: ICD-10-CM

## 2019-11-22 DIAGNOSIS — Z79.4 CONTROLLED TYPE 2 DIABETES MELLITUS WITH STAGE 3 CHRONIC KIDNEY DISEASE, WITH LONG-TERM CURRENT USE OF INSULIN: ICD-10-CM

## 2019-12-11 RX ORDER — SILDENAFIL CITRATE 20 MG/1
TABLET ORAL
Qty: 30 TABLET | Refills: 12 | Status: SHIPPED | OUTPATIENT
Start: 2019-12-11 | End: 2020-10-28

## 2019-12-20 ENCOUNTER — CLINICAL SUPPORT (OUTPATIENT)
Dept: AUDIOLOGY | Facility: CLINIC | Age: 69
End: 2019-12-20
Payer: MEDICARE

## 2019-12-20 ENCOUNTER — OFFICE VISIT (OUTPATIENT)
Dept: OTOLARYNGOLOGY | Facility: CLINIC | Age: 69
End: 2019-12-20
Payer: MEDICARE

## 2019-12-20 DIAGNOSIS — H61.23 BILATERAL IMPACTED CERUMEN: ICD-10-CM

## 2019-12-20 DIAGNOSIS — H90.A21 SENSORINEURAL HEARING LOSS (SNHL) OF RIGHT EAR WITH RESTRICTED HEARING OF LEFT EAR: Primary | ICD-10-CM

## 2019-12-20 DIAGNOSIS — H90.3 SENSORINEURAL HEARING LOSS (SNHL) OF BOTH EARS: Primary | ICD-10-CM

## 2019-12-20 DIAGNOSIS — H90.5 HIGH FREQUENCY SENSORINEURAL HEARING LOSS OF LEFT EAR: ICD-10-CM

## 2019-12-20 PROCEDURE — 99202 OFFICE O/P NEW SF 15 MIN: CPT | Mod: 25,HCNC,S$GLB, | Performed by: NURSE PRACTITIONER

## 2019-12-20 PROCEDURE — 1159F MED LIST DOCD IN RCRD: CPT | Mod: HCNC,S$GLB,, | Performed by: NURSE PRACTITIONER

## 2019-12-20 PROCEDURE — 1101F PR PT FALLS ASSESS DOC 0-1 FALLS W/OUT INJ PAST YR: ICD-10-PCS | Mod: HCNC,CPTII,S$GLB, | Performed by: NURSE PRACTITIONER

## 2019-12-20 PROCEDURE — 1159F PR MEDICATION LIST DOCUMENTED IN MEDICAL RECORD: ICD-10-PCS | Mod: HCNC,S$GLB,, | Performed by: NURSE PRACTITIONER

## 2019-12-20 PROCEDURE — 99202 PR OFFICE/OUTPT VISIT, NEW, LEVL II, 15-29 MIN: ICD-10-PCS | Mod: 25,HCNC,S$GLB, | Performed by: NURSE PRACTITIONER

## 2019-12-20 PROCEDURE — G0268 EAR CERUMEN REMOVAL: ICD-10-PCS | Mod: HCNC,S$GLB,, | Performed by: NURSE PRACTITIONER

## 2019-12-20 PROCEDURE — 99999 PR PBB SHADOW E&M-EST. PATIENT-LVL III: ICD-10-PCS | Mod: PBBFAC,HCNC,, | Performed by: NURSE PRACTITIONER

## 2019-12-20 PROCEDURE — 92567 PR TYMPA2METRY: ICD-10-PCS | Mod: HCNC,S$GLB,, | Performed by: AUDIOLOGIST

## 2019-12-20 PROCEDURE — G0268 REMOVAL OF IMPACTED WAX MD: HCPCS | Mod: HCNC,S$GLB,, | Performed by: NURSE PRACTITIONER

## 2019-12-20 PROCEDURE — 92567 TYMPANOMETRY: CPT | Mod: HCNC,S$GLB,, | Performed by: AUDIOLOGIST

## 2019-12-20 PROCEDURE — 99999 PR PBB SHADOW E&M-EST. PATIENT-LVL III: CPT | Mod: PBBFAC,HCNC,, | Performed by: NURSE PRACTITIONER

## 2019-12-20 PROCEDURE — 1126F PR PAIN SEVERITY QUANTIFIED, NO PAIN PRESENT: ICD-10-PCS | Mod: HCNC,S$GLB,, | Performed by: NURSE PRACTITIONER

## 2019-12-20 PROCEDURE — 1101F PT FALLS ASSESS-DOCD LE1/YR: CPT | Mod: HCNC,CPTII,S$GLB, | Performed by: NURSE PRACTITIONER

## 2019-12-20 PROCEDURE — 92557 PR COMPREHENSIVE HEARING TEST: ICD-10-PCS | Mod: HCNC,S$GLB,, | Performed by: AUDIOLOGIST

## 2019-12-20 PROCEDURE — 92557 COMPREHENSIVE HEARING TEST: CPT | Mod: HCNC,S$GLB,, | Performed by: AUDIOLOGIST

## 2019-12-20 PROCEDURE — 1126F AMNT PAIN NOTED NONE PRSNT: CPT | Mod: HCNC,S$GLB,, | Performed by: NURSE PRACTITIONER

## 2019-12-20 NOTE — PROCEDURES
Ear Cerumen Removal  Date/Time: 12/20/2019 9:00 AM  Performed by: Iglesia Weston NP  Authorized by: Iglesia Weston NP     Location details:  Both ears  Procedure type: curette    Cerumen  Removal Results:  Cerumen completely removed  Patient tolerance:  Patient tolerated the procedure well with no immediate complications     Procedure Note:    The patient was brought to the minor procedure room and placed under the operating microscope of the left ear canal which was cleaned of ceruminous debris. Using a combination of suction, curettes and cup forceps the patient's cerumen impaction was removed. The tympanic membrane was evaluated and was unremarkable. The patient tolerated the procedure well. There were no complications.  Procedure Note:    Patient was brought to the minor procedure room and using the operating microscope of the right ear canal which was cleaned of ceruminous debris. There was a significant cerumen impaction.  Using a combination of suction, curettes and cup forceps the patient's cerumen impaction was removed. Tympanic membrane intact. Pt tolerated well. There were no complications.

## 2019-12-20 NOTE — LETTER
December 20, 2019      Jono Willoughby MD  [address]  [place] [address]           [facility] - Otorhinolaryngology  [address]  [place] [address]  Phone: [phone]  Fax: [phone]          Patient: Driss Hernandez Jr.   MR Number: [id number]   YOB: 1950   Date of Visit: 12/20/2019       Dear Dr. Jono Willoughby:    Thank you for referring Driss Hernandez to me for evaluation. Attached you will find relevant portions of my assessment and plan of care.    If you have questions, please do not hesitate to call me. I look forward to following Driss Hernandez along with you.    Sincerely,    Iglesia Weston, NP    Enclosure  CC:  No Recipients    If you would like to receive this communication electronically, please contact [email or web address] or [phone] to request more information on [facility] access.    For providers and/or their staff who would like to refer a patient to [facility], please contact us through our one-stop-shop provider referral line, [facility], at [phone].    If you feel you have received this communication in error or would no longer like to receive these types of communications, please e-mail [email or web address]

## 2019-12-20 NOTE — PROGRESS NOTES
Subjective:      Driss Hernandez Jr. is a 69 y.o. male who was referred to me by Dr. Jono Willoughby in consultation for hearing loss.    Mr. Hernandez reports decreased hearing in both ears for the past several years. He states he has trouble hearing his wife speak. He denies tinnitus or dizziness. He denies ear pain or ear drainage.  There is not a family history of hearing loss at a young age.  There is not a prior history of ear surgery.  There is not a prior history of ear infections .  He denies a history of significant noise exposure.  He does not wear hearing aids currently.  He has not had a hearing test recently.     Past Medical History  He has a past medical history of Chronic midline low back pain without sciatica, Diabetes mellitus with neurological manifestations, uncontrolled, Diabetic polyneuropathy associated with type 2 diabetes mellitus, Diabetic polyneuropathy associated with type 2 diabetes mellitus, Essential hypertension, Gastroesophageal reflux disease, Hyperlipidemia, Insomnia, Long-term insulin use, Lumbar spondylosis, Nuclear sclerosis of both eyes, Obesity, Stage 3 chronic kidney disease, and Uncontrolled type 2 diabetes mellitus without complication, with long-term current use of insulin.    Past Surgical History  He has a past surgical history that includes Colonoscopy (N/A, 2/21/2017); Coronary artery bypass graft; Back surgery; Tonsillectomy; Cataract extraction w/  intraocular lens implant (Right, 08/29/2017); Epidural steroid injection (Bilateral, 11/14/2018); Epidural steroid injection (Bilateral, 11/28/2018); and Epidural steroid injection (Bilateral, 3/20/2019).    Family History  His family history includes Blindness in his brother; Depression in his mother; Diabetes in his sister; Heart disease in his mother; No Known Problems in his maternal aunt, maternal grandfather, maternal grandmother, maternal uncle, paternal aunt, paternal grandfather, paternal grandmother,  paternal uncle, sister, and sister; Stroke in his father.    Social History  He reports that he has never smoked. He has never used smokeless tobacco. He reports that he does not drink alcohol or use drugs.    Allergies  He is allergic to penicillins and shrimp.    Medications  He has a current medication list which includes the following prescription(s): accu-chek tish control soln, accu-chek tish plus test strp, accu-chek softclix lancets, amlodipine, aspirin, atorvastatin, bd alcohol swabs, bd ultra-fine diego pen needle, ergocalciferol, fenofibrate, gabapentin, insulin regular, lantus solostar u-100 insulin, magnesium, metformin, metoprolol succinate, omega-3 acid ethyl esters, omeprazole, semaglutide, sildenafil, tizanidine, and triazolam.    Review of Systems   Constitutional: Negative for chills, fever and unexpected weight change.   HENT: Positive for hearing loss. Negative for ear discharge, ear pain, sore throat, tinnitus and trouble swallowing.    Eyes: Negative for pain and visual disturbance.   Respiratory: Negative for apnea and shortness of breath.    Cardiovascular: Negative for chest pain and palpitations.   Gastrointestinal: Negative for abdominal pain and nausea.   Endocrine: Negative for cold intolerance and heat intolerance.   Musculoskeletal: Negative for joint swelling and neck stiffness.   Skin: Negative for color change and rash.   Neurological: Negative for dizziness, facial asymmetry and headaches.   Hematological: Negative for adenopathy. Does not bruise/bleed easily.   Psychiatric/Behavioral: Negative for agitation and sleep disturbance. The patient is not nervous/anxious.           Objective:     There were no vitals taken for this visit.     Constitutional:   Vital signs are normal. He appears well-developed and well-nourished.     Head:  Normocephalic and atraumatic.     Ears:    Right Ear: No lacerations. No drainage, swelling or tenderness. No foreign bodies. No mastoid tenderness.  Tympanic membrane is not injected, not scarred, not perforated, not erythematous, not retracted and not bulging. Tympanic membrane mobility is normal. No middle ear effusion. No hemotympanum. Decreased hearing is noted.   Left Ear: No lacerations. No drainage, swelling or tenderness. No foreign bodies. No mastoid tenderness. Tympanic membrane is not injected, not scarred, not perforated, not erythematous, not retracted and not bulging. Tympanic membrane mobility is normal.  No middle ear effusion. No hemotympanum. Decreased hearing is noted.     Nose:  Nose normal including turbinates, nasal mucosa, sinuses and nasal septum.     Mouth/Throat  Lips, teeth, and gums normal and oropharynx normal.     Neck:  Neck normal without thyromegaly masses, asymmetry, normal tracheal structure, crepitus, and tenderness and no adenopathy.     Psychiatric:   He has a normal mood and affect.       Procedure    None      Data Reviewed    WBC (K/uL)   Date Value   11/26/2018 5.20     Platelets (K/uL)   Date Value   11/26/2018 204      Creatinine (mg/dL)   Date Value   06/12/2019 1.3     TSH (uIU/mL)   Date Value   02/22/2019 2.496     Glucose (mg/dL)   Date Value   06/12/2019 150 (H)     Hemoglobin A1C (%)   Date Value   08/29/2019 7.7       I independently reviewed the tracings of the complete audiometric evaluation performed today.  I reviewed the audiogram with the patient as well.  Pertinent findings include bilateral mild to moderate-sever SNHL 250-8000Hz. Right SRT 20 with 92% discrimination at 60db. Left SRT 15 with 100% discrimination at 60db. Type A tympanogram in both ears with slight retraction in right..         Assessment:     1. Sensorineural hearing loss (SNHL) of both ears    2. Bilateral impacted cerumen         Plan:     I had a long discussion with the patient regarding his condition and the further workup and management options.    Cerumen impaction removed under microscope.  Audiogram Reviewed: Bilateral high  frequency SNHL.  Hearing conservation strongly recommended.  Patient declines hearing amplification at this time.  Re-check of hearing in 1 year or sooner if subjective change noted.    Follow up in about 1 year (around 12/20/2020), or if symptoms worsen or fail to improve.

## 2019-12-20 NOTE — PROGRESS NOTES
Driss Hernandez Jr. was seen in the clinic today for an audiological evaluation.  Mr. Hernandez reported that he occasionally experiences bilateral hearing loss.  He also stated that he has a history of recreational noise exposure (music).    Audiological testing revealed a mild to moderately-severe mid to high frequency sensorineural hearing loss for the right ear and a mild to moderately-severe high frequency sensorineural hearing loss for the left ear.  A speech reception threshold was obtained at 20 dBHL for the right ear and at 15 dBHL for the left ear.  Speech discrimination was 92% for the right ear and 100% for the left ear.      Tympanometry testing revealed a Type A tympanogram for the right ear and a Type A tympanogram for the left ear.      Recommendations:  1. Otologic evaluation  2. Annual audiological evaluation  3. Hearing protection when in noise   4. Hearing aid consultation (specifically for the right ear) following medical clearance

## 2020-01-06 ENCOUNTER — OFFICE VISIT (OUTPATIENT)
Dept: PAIN MEDICINE | Facility: CLINIC | Age: 70
End: 2020-01-06
Payer: MEDICARE

## 2020-01-06 VITALS
HEIGHT: 67 IN | HEART RATE: 69 BPM | OXYGEN SATURATION: 99 % | DIASTOLIC BLOOD PRESSURE: 89 MMHG | BODY MASS INDEX: 31.76 KG/M2 | WEIGHT: 202.38 LBS | SYSTOLIC BLOOD PRESSURE: 135 MMHG | TEMPERATURE: 98 F

## 2020-01-06 DIAGNOSIS — M51.36 DDD (DEGENERATIVE DISC DISEASE), LUMBAR: ICD-10-CM

## 2020-01-06 DIAGNOSIS — M47.816 LUMBAR SPONDYLOSIS: ICD-10-CM

## 2020-01-06 DIAGNOSIS — M96.1 POSTLAMINECTOMY SYNDROME OF LUMBAR REGION: Primary | ICD-10-CM

## 2020-01-06 DIAGNOSIS — M47.897 OTHER OSTEOARTHRITIS OF SPINE, LUMBOSACRAL REGION: ICD-10-CM

## 2020-01-06 DIAGNOSIS — M79.18 MYOFASCIAL PAIN: ICD-10-CM

## 2020-01-06 PROCEDURE — 1125F PR PAIN SEVERITY QUANTIFIED, PAIN PRESENT: ICD-10-PCS | Mod: HCNC,S$GLB,, | Performed by: PAIN MEDICINE

## 2020-01-06 PROCEDURE — 20553 NJX 1/MLT TRIGGER POINTS 3/>: CPT | Mod: HCNC,S$GLB,, | Performed by: PAIN MEDICINE

## 2020-01-06 PROCEDURE — 99999 PR PBB SHADOW E&M-EST. PATIENT-LVL III: ICD-10-PCS | Mod: PBBFAC,HCNC,, | Performed by: PAIN MEDICINE

## 2020-01-06 PROCEDURE — 3079F DIAST BP 80-89 MM HG: CPT | Mod: HCNC,CPTII,S$GLB, | Performed by: PAIN MEDICINE

## 2020-01-06 PROCEDURE — 99213 PR OFFICE/OUTPT VISIT, EST, LEVL III, 20-29 MIN: ICD-10-PCS | Mod: 25,HCNC,S$GLB, | Performed by: PAIN MEDICINE

## 2020-01-06 PROCEDURE — 3075F SYST BP GE 130 - 139MM HG: CPT | Mod: HCNC,CPTII,S$GLB, | Performed by: PAIN MEDICINE

## 2020-01-06 PROCEDURE — 99213 OFFICE O/P EST LOW 20 MIN: CPT | Mod: 25,HCNC,S$GLB, | Performed by: PAIN MEDICINE

## 2020-01-06 PROCEDURE — 1125F AMNT PAIN NOTED PAIN PRSNT: CPT | Mod: HCNC,S$GLB,, | Performed by: PAIN MEDICINE

## 2020-01-06 PROCEDURE — 20553 PR INJECT TRIGGER POINTS, > 3: ICD-10-PCS | Mod: HCNC,S$GLB,, | Performed by: PAIN MEDICINE

## 2020-01-06 PROCEDURE — 1159F MED LIST DOCD IN RCRD: CPT | Mod: HCNC,S$GLB,, | Performed by: PAIN MEDICINE

## 2020-01-06 PROCEDURE — 1101F PR PT FALLS ASSESS DOC 0-1 FALLS W/OUT INJ PAST YR: ICD-10-PCS | Mod: HCNC,CPTII,S$GLB, | Performed by: PAIN MEDICINE

## 2020-01-06 PROCEDURE — 3079F PR MOST RECENT DIASTOLIC BLOOD PRESSURE 80-89 MM HG: ICD-10-PCS | Mod: HCNC,CPTII,S$GLB, | Performed by: PAIN MEDICINE

## 2020-01-06 PROCEDURE — 3075F PR MOST RECENT SYSTOLIC BLOOD PRESS GE 130-139MM HG: ICD-10-PCS | Mod: HCNC,CPTII,S$GLB, | Performed by: PAIN MEDICINE

## 2020-01-06 PROCEDURE — 1159F PR MEDICATION LIST DOCUMENTED IN MEDICAL RECORD: ICD-10-PCS | Mod: HCNC,S$GLB,, | Performed by: PAIN MEDICINE

## 2020-01-06 PROCEDURE — 99999 PR PBB SHADOW E&M-EST. PATIENT-LVL III: CPT | Mod: PBBFAC,HCNC,, | Performed by: PAIN MEDICINE

## 2020-01-06 PROCEDURE — 1101F PT FALLS ASSESS-DOCD LE1/YR: CPT | Mod: HCNC,CPTII,S$GLB, | Performed by: PAIN MEDICINE

## 2020-01-06 RX ORDER — TRIAMCINOLONE ACETONIDE 40 MG/ML
40 INJECTION, SUSPENSION INTRA-ARTICULAR; INTRAMUSCULAR
Status: COMPLETED | OUTPATIENT
Start: 2020-01-06 | End: 2020-01-06

## 2020-01-06 RX ADMIN — TRIAMCINOLONE ACETONIDE 40 MG: 40 INJECTION, SUSPENSION INTRA-ARTICULAR; INTRAMUSCULAR at 11:01

## 2020-01-06 NOTE — PROGRESS NOTES
Subjective:     Patient ID: Driss Hernandez Jr. is a 69 y.o. male    Chief Complaint: Low-back Pain      Referred by: No ref. provider found      HPI:    Interval History (1/6/20):  He returns today for follow up.  He reports that lumbar trigger point injections have been helpful for the bilateral low back pain. He reports greater than 85% relief for over 9 months.  He states the pain has returned.  He denies any changes in the quality or location was pain. He would like repeat trigger point injections today.      Interval History (3/18/19):  He returns today for follow up.  He reports that trigger point injections have been helpful for the right-sided low back pain. He reports near complete resolution of the sharp right-sided low back pain. He does report that his previous low back pain starting to return.  He feels as though his current pain is exact same pain that was previously helped by sacroiliac joint injections.  He is interested in repeat injections if appropriate.      Interval History (2/12/19):  He returns today for follow up and imaging review.  He reports that his pain is unchanged since last encounter.  He denies any new or worsening neurologic symptoms.      Interval History (2/7/19):  He returns today for follow up.  He reports that he has been having acute right-sided low back pain for about 1 week.  He denies any specific inciting event or injury.  The pain is located in the lateral aspect of the right lumbosacral region.  It does not radiate.  He denies any associated numbness, tingling, weakness, bowel bladder dysfunction.  The pain varies in intensity from day-to-day.  The pain is much worse with coughing and sneezing and sitting with a forward flexed posture.  He is still able to go to the gym at times.      Interval History (12/17/18):  He returns today for follow up.  He reports that bilateral SIJ injections has been helpful for the low back pain. He reports about 80% relief. He does not  feel that he needs any further interventions at this time      Interval History (11/19/18):  He returns today for follow up.  He reports that bilateral lumbar medial branch blocks were not helpful. Pain is unchanged in quality and location since last visit. He denies any new symptoms.       Interval History (10/16/18):  He returns today for follow up.  He reports that he has continued to have bilateral low back pain. He still has some right lower extremity pain, but this is not as bothersome as his low back pain. The pain is located across the lower lumbar region R>L. It does not radiate. It is not associated with any numbness, tingling, weakness or b/b dysfunction. The pain is worse with activity. He also requests refills of tizanidine.      Interval History (12/18/17):  He returns today for follow up.  He reports that right L4 TFESI has been helpful for the right lumbar radicular pain. He reports 100% relief. He still has some lower back pain, but would prefer to discuss this later. Otherwise he is doing well.       Interval History (11/13/17):  He returns today for follow up.  He reports that PT has been helpful for the low back pain. He still has significant right foot and ankle pain when sitting in a reclined postion. This is the most painful area. He also has low back pain that is constant but does not bother him as much. Otherwise he is doing well.       Driss Hernandez  is a 69 y.o. male who presents today with chronic bilateral low back pain R>>>L. Pain started over 40 years ago. No inciting injury or event noted. Pain is located mainly on the right side of the lower lumbar paraspinals. About 5 weeks ago, patient began to have right lwoer extremity pain that he felt was related to his back pain, but this has subsided. He denies any numbness, tingling, weakness, or b/b dysfunction. The pain is described as dull, but can become sharp at times. Patient states that his pain is worse in the morning. He  does not sleep well. States that he wakes up every 45 minutes. Has taken Tizanidine PRN in the pat, but cannot recall how it affected him. Probably has not taken in over a year. Cannot take NSAIDs due to decreased renal function. Has taken in the past with mild relief.  This pain is described in detail below.    Physical Therapy: Has completed course of PT and continues HEP    Non-pharmacologic Treatment: Nothing really helps         · TENS? No    Pain Medications:         · Currently taking: Gabapentin 500mg per day, Tizandine 16mg QHS. Tylenol p.r.n.    · Has tried in the past:  NSAIDs,     · Has not tried: Opioids, Tylenol, TCAs, SNRIs, topical creams    Blood thinners: ASA 325mg daily    Interventional Therapies:   11/29/17 - Right L4 TFESI - 100% relief  10/28/18 - bilateral SIJ injections -  80% relief  11/14/18 - Bilateral L2, L3, L4 and L5 MBBs - No relief noted  3/20/19 - bilateral sacroiliac joint steroid injections    Relevant Surgeries: Previous right L4 hemilaminectomy    Affecting sleep? Yes    Affecting daily activities? yes    Depressive symptoms? no          · SI/HI? No    Work status: Unemployed    Pain Scores:    Best:       0/10  Worst:     10/10  Usually:   3/10  Today:    5/10    Review of Systems   Constitutional: Negative for activity change, appetite change, chills, fatigue, fever and unexpected weight change.   HENT: Negative for hearing loss.    Eyes: Negative for visual disturbance.   Respiratory: Negative for chest tightness and shortness of breath.    Cardiovascular: Negative for chest pain.   Gastrointestinal: Negative for abdominal pain, constipation, diarrhea, nausea and vomiting.   Genitourinary: Negative for difficulty urinating.   Musculoskeletal: Positive for back pain and myalgias. Negative for gait problem and neck pain.   Skin: Negative for rash.   Neurological: Negative for dizziness, weakness, light-headedness, numbness and headaches.   Psychiatric/Behavioral: Positive for  sleep disturbance. Negative for hallucinations and suicidal ideas. The patient is not nervous/anxious.        Past Medical History:   Diagnosis Date    Chronic midline low back pain without sciatica 10/2/2017    Diabetes mellitus with neurological manifestations, uncontrolled 1/24/2017    Diabetic polyneuropathy associated with type 2 diabetes mellitus 1/24/2017    Diabetic polyneuropathy associated with type 2 diabetes mellitus     Essential hypertension 1/24/2017    Gastroesophageal reflux disease 1/24/2017    Hyperlipidemia 1/24/2017    Insomnia 1/24/2017    Long-term insulin use 1/24/2017    Lumbar spondylosis 11/13/2017    Nuclear sclerosis of both eyes 8/24/2017    Obesity     Stage 3 chronic kidney disease 2/13/2019    Uncontrolled type 2 diabetes mellitus without complication, with long-term current use of insulin 1/24/2017       Past Surgical History:   Procedure Laterality Date    BACK SURGERY      2002    CATARACT EXTRACTION W/  INTRAOCULAR LENS IMPLANT Right 08/29/2017    Dr. Hong    COLONOSCOPY N/A 2/21/2017    Procedure: COLONOSCOPY;  Surgeon: Robb Rosado MD;  Location: [facility] ENDO;  Service: Endoscopy;  Laterality: N/A;    CORONARY ARTERY BYPASS GRAFT      March 2016    EPIDURAL STEROID INJECTION Bilateral 11/14/2018    Procedure: Lumbar Medial Branch Blocks;  Surgeon: Eze Byrd Jr., MD;  Location: [facility] ENDO;  Service: Pain Management;  Laterality: Bilateral;  Bilateral Lumbar Medial Branch Blocks L2, L3, L4, L5    [id number]  [id number]    Arrive @ 1150; NO Sedation    EPIDURAL STEROID INJECTION Bilateral 11/28/2018    Procedure: Injection, Steroid, Epidural;  Surgeon: Eze Byrd Jr., MD;  Location: [facility] ENDO;  Service: Pain Management;  Laterality: Bilateral;  Bilateral Sacroiliac Joint Steroid Injections    92215    Arrive @ 0910    EPIDURAL STEROID INJECTION Bilateral 3/20/2019    Procedure: Injection, Steroid, Sacroiliiac Joint;  Surgeon: Eze CRONIN  Rochelle Akbar MD;  Location: [facility];  Service: Pain Management;  Laterality: Bilateral;  Bilateral Sacroiliac Joint Steroid Injections    [id number]    Arrive @ 1145; Trigger point injections also?    TONSILLECTOMY         Social History     Socioeconomic History    Marital status:      Spouse name: Not on file    Number of children: Not on file    Years of education: Not on file    Highest education level: Not on file   Occupational History    Not on file   Social Needs    Financial resource strain: Not very hard    Food insecurity:     Worry: Never true     Inability: Never true    Transportation needs:     Medical: No     Non-medical: No   Tobacco Use    Smoking status: Never Smoker    Smokeless tobacco: Never Used   Substance and Sexual Activity    Alcohol use: No     Frequency: Monthly or less     Drinks per session: 1 or 2     Binge frequency: Never    Drug use: No    Sexual activity: Yes     Partners: Female   Lifestyle    Physical activity:     Days per week: 4 days     Minutes per session: 50 min    Stress: To some extent   Relationships    Social connections:     Talks on phone: More than three times a week     Gets together: Three times a week     Attends Muslim service: Not on file     Active member of club or organization: Yes     Attends meetings of clubs or organizations: More than 4 times per year     Relationship status:    Other Topics Concern    Not on file   Social History Narrative    Not on file       Review of patient's allergies indicates:   Allergen Reactions    Penicillins Other (See Comments)     Unknown reaction, Had a reaction as a child    Shrimp Itching     Hand itching       Current Outpatient Medications on File Prior to Visit   Medication Sig Dispense Refill    ACCU-CHEK MOIZ CONTROL SOLN Soln       ACCU-CHEK MOIZ PLUS TEST STRP Strp TEST THREE TIMES DAILY 300 strip 12    ACCU-CHEK SOFTCLIX LANCETS Misc       amLODIPine (NORVASC) 10 MG  "tablet Take 1 tablet (10 mg total) by mouth once daily. 30 tablet 11    aspirin 325 MG tablet Take 325 mg by mouth every morning.       atorvastatin (LIPITOR) 40 MG tablet TAKE 1 TABLET (40 MG TOTAL) BY MOUTH EVERY MORNING. 90 tablet 12    BD ALCOHOL SWABS PadM       BD ULTRA-FINE ROLAND PEN NEEDLES 32 gauge x 5/32" Ndle       ergocalciferol (ERGOCALCIFEROL) 50,000 unit Cap TK ONE C PO ONCE A WK  3    fenofibrate 160 MG Tab TAKE 1 TABLET EVERY MORNING 90 tablet 4    gabapentin (NEURONTIN) 100 MG capsule TAKE 2 CAPSULES IN THE MORNING AND 3 CAPSULES WITH DINNER 450 capsule 3    insulin regular (NOVOLIN R REGULAR U-100 INSULN) 100 unit/mL Inj injection       LANTUS SOLOSTAR U-100 INSULIN 100 unit/mL (3 mL) InPn pen INJECT 13 UNITS INTO THE SKIN EVERY MORNING. (Patient taking differently: Inject 20 Units into the skin every morning. ) 15 mL 2    magnesium 250 mg Tab       metFORMIN (GLUCOPHAGE-XR) 500 MG 24 hr tablet Take 2 tablets (1,000 mg total) by mouth 2 (two) times daily with meals. Takes two 500 mg twice a day 120 tablet 12    metoprolol succinate (TOPROL-XL) 50 MG 24 hr tablet Take 1 tablet (50 mg total) by mouth daily with dinner or evening meal. 90 tablet 4    omega-3 acid ethyl esters (LOVAZA) 1 gram capsule Take 2 capsules by mouth 2 (two) times daily.  3    omeprazole (PRILOSEC) 20 MG capsule Take 1 capsule (20 mg total) by mouth daily with dinner or evening meal. 90 capsule 3    semaglutide (OZEMPIC) 1 mg/dose (2 mg/1.5 mL) PnIj Inject into the skin.      sildenafil (REVATIO) 20 mg Tab TAKE ONE to 5 TABLETS AT A time per day AS NEEDED 30 tablet 12    tiZANidine (ZANAFLEX) 4 MG tablet Take 4 mg by mouth every 6 (six) hours as needed.      triazolam (HALCION) 0.25 MG Tab TK 1 T PO QHS, prn 30 tablet 3     No current facility-administered medications on file prior to visit.        Objective:      /89   Pulse 69   Temp 97.9 °F (36.6 °C) (Oral)   Ht 5' 7" (1.702 m)   Wt 91.8 kg (202 " lb 6.4 oz)   SpO2 99%   BMI 31.70 kg/m²     Exam:  GEN:  Well developed, well nourished.  No acute distress.   HEENT:  No trauma.  Mucous membranes moist.  Nares patent bilaterally.  PSYCH: Normal affect. Thought content appropriate.  CHEST:  Breathing symmetric.  No audible wheezing.  ABD: Soft, non-distended.  SKIN:  Warm, pink, dry.  No rash on exposed areas.    EXT:  No cyanosis, clubbing, or edema.  No color change or changes in nail or hair growth.  NEURO/MUSCULOSKELETAL:  Fully alert, oriented, and appropriate. Speech normal ivania. No cranial nerve deficits.   Gait:  Normal.  No focal motor deficits.           Imaging:  Narrative     EXAMINATION:  MRI LUMBAR SPINE WITHOUT CONTRAST    CLINICAL HISTORY:  lumbar ddd; Other intervertebral disc degeneration, lumbar region    TECHNIQUE:  Multiplanar, multisequence MR images were acquired from the thoracolumbar junction to the sacrum without the administration of contrast.    COMPARISON:  08/23/2017    FINDINGS:  There is no evidence of fracture or marrow replacement process.  There is disc desiccation at all lumbar levels with disc space narrowing at multiple levels but most significant at L4-5.  Sagittal alignment is maintained.  Conus terminates at T12-L1.  Visualized retroperitoneal structures demonstrate no significant abnormalities.    T12-L1, L1-L2: Mild diffuse disc bulge with no significant central canal stenosis or neural foraminal narrowing.    L2-L3: Mild diffuse disc bulge with moderate facet arthropathy.  No significant central canal stenosis or neural foraminal narrowing.    L3-4: Mild diffuse disc bulge extending into both neural foramen with moderate facet arthropathy causing moderate right and mild left neural foraminal narrowing.  No significant central canal stenosis.    L4-5: Hemilaminectomy defect on the right.  There is a diffuse disc bulge extending into both neural foramen with a superimposed central extrusion with an annular fissure  extending slightly below the level of the disc plane.  This causes mass effect on the lateral recess and may impinge on the descending nerve roots although the canal is decompressed posteriorly by the laminectomy defect with no significant central canal stenosis.  There is severe facet arthropathy contributing to severe bilateral neural foraminal narrowing.    L5-S1: There is severe facet arthropathy.  There is a diffuse disc bulge with no significant central canal stenosis.  There is severe bilateral neural foraminal narrowing..   Impression       Postoperative changes at L4 on the right with decompression of the central canal.  There is lumbar spondylosis most significant at L4-5 and L5-S1 where there is severe bilateral neural foraminal narrowing.  Specific details at each level are discussed above.      Electronically signed by: Quinton Goins MD  Date: 02/12/2019  Time: 10:10         Assessment:       Encounter Diagnoses   Name Primary?    Postlaminectomy syndrome of lumbar region Yes    Lumbar spondylosis     DDD (degenerative disc disease), lumbar     Other osteoarthritis of spine, lumbosacral region     Myofascial pain          Plan:       Driss was seen today for low-back pain.    Diagnoses and all orders for this visit:    Postlaminectomy syndrome of lumbar region    Lumbar spondylosis    DDD (degenerative disc disease), lumbar    Other osteoarthritis of spine, lumbosacral region    Myofascial pain  -     triamcinolone acetonide injection 40 mg        Driss Burttima Akbar is a 69 y.o. male with chronic bilateral low back pain. He has had this pain in the past that was very much improved following trigger point injection..    1.  Bilateral lumbar paraspinal trigger point injections done today.  2.  Patient consented for bilateral sacroiliac joint steroid injections.  He can call to schedule these procedures of trigger point injections do not provide adequate relief.  3.  Return to clinic as  needed.      Trigger Point Injection:   The procedure was discussed with the patient including complications of damage, bleeding, infection, and failure of pain relief.     All medications, allergies, and relevant histories were reviewed. No recent antibiotics or infections.  A time-out was taken to verify the correct patient, procedure, laterality, and appropriate medications/allergies.    Trigger points were identified by palpation and marked. CHG prep of sites done. A 27-gauge needle was advanced to the point of maximal tenderness, 1 mL of a mixture of 1% lidocaine 5 mL, bupivacaine 0.5% 5 mL and Kenalog 40 mg  was injected after negative aspiration in a fanlike distribution. All sites done in the same manner. Patient tolerated the procedure well and without complications. Sites injected included:  Bilateral lumbar paraspinals x6     The patient tolerated the procedure well and was discharged in excellent condition.

## 2020-01-23 DIAGNOSIS — E78.49 OTHER HYPERLIPIDEMIA: ICD-10-CM

## 2020-01-23 NOTE — TELEPHONE ENCOUNTER
Patient last visit was 11/15/2019    Please advise patient's fenofibrate 160 MG Tab refill request.    Thanks,  Owen

## 2020-01-23 NOTE — TELEPHONE ENCOUNTER
Patient last visit was 11/15/2019.    Please advise patient's ACCU-CHEK MOIZ PLUS TEST STRP Strp refill request.    Thanks,  Owen

## 2020-01-24 RX ORDER — BLOOD SUGAR DIAGNOSTIC
STRIP MISCELLANEOUS
Qty: 300 STRIP | Refills: 12 | Status: ON HOLD | OUTPATIENT
Start: 2020-01-24 | End: 2020-11-10 | Stop reason: SDUPTHER

## 2020-01-24 RX ORDER — FENOFIBRATE 160 MG/1
160 TABLET ORAL EVERY MORNING
Qty: 90 TABLET | Refills: 4 | Status: SHIPPED | OUTPATIENT
Start: 2020-01-24 | End: 2021-01-25

## 2020-02-05 ENCOUNTER — PES CALL (OUTPATIENT)
Dept: ADMINISTRATIVE | Facility: CLINIC | Age: 70
End: 2020-02-05

## 2020-02-10 ENCOUNTER — PES CALL (OUTPATIENT)
Dept: ADMINISTRATIVE | Facility: CLINIC | Age: 70
End: 2020-02-10

## 2020-03-02 DIAGNOSIS — I10 ESSENTIAL HYPERTENSION: ICD-10-CM

## 2020-03-02 RX ORDER — AMLODIPINE BESYLATE 10 MG/1
TABLET ORAL
Qty: 30 TABLET | Refills: 12 | Status: SHIPPED | OUTPATIENT
Start: 2020-03-02 | End: 2021-03-25 | Stop reason: SDUPTHER

## 2020-03-03 RX ORDER — METOPROLOL SUCCINATE 50 MG/1
TABLET, EXTENDED RELEASE ORAL
Qty: 90 TABLET | Refills: 4 | Status: SHIPPED | OUTPATIENT
Start: 2020-03-03 | End: 2020-12-16

## 2020-03-05 ENCOUNTER — OFFICE VISIT (OUTPATIENT)
Dept: CARDIOLOGY | Facility: CLINIC | Age: 70
End: 2020-03-05
Payer: MEDICARE

## 2020-03-05 VITALS
OXYGEN SATURATION: 97 % | BODY MASS INDEX: 29.66 KG/M2 | WEIGHT: 189 LBS | DIASTOLIC BLOOD PRESSURE: 64 MMHG | SYSTOLIC BLOOD PRESSURE: 120 MMHG | HEART RATE: 68 BPM | HEIGHT: 67 IN

## 2020-03-05 DIAGNOSIS — I10 ESSENTIAL HYPERTENSION: Primary | ICD-10-CM

## 2020-03-05 DIAGNOSIS — R07.89 CHEST PAIN, ATYPICAL: ICD-10-CM

## 2020-03-05 DIAGNOSIS — R06.02 SOB (SHORTNESS OF BREATH): ICD-10-CM

## 2020-03-05 DIAGNOSIS — E78.2 MIXED HYPERLIPIDEMIA: ICD-10-CM

## 2020-03-05 DIAGNOSIS — I25.10 CORONARY ARTERY DISEASE INVOLVING NATIVE CORONARY ARTERY OF NATIVE HEART WITHOUT ANGINA PECTORIS: ICD-10-CM

## 2020-03-05 DIAGNOSIS — Z95.1 HX OF CABG: ICD-10-CM

## 2020-03-05 PROCEDURE — 99214 OFFICE O/P EST MOD 30 MIN: CPT | Mod: S$GLB,,, | Performed by: INTERNAL MEDICINE

## 2020-03-05 PROCEDURE — 1159F PR MEDICATION LIST DOCUMENTED IN MEDICAL RECORD: ICD-10-PCS | Mod: S$GLB,,, | Performed by: INTERNAL MEDICINE

## 2020-03-05 PROCEDURE — 1101F PR PT FALLS ASSESS DOC 0-1 FALLS W/OUT INJ PAST YR: ICD-10-PCS | Mod: CPTII,S$GLB,, | Performed by: INTERNAL MEDICINE

## 2020-03-05 PROCEDURE — 99999 PR PBB SHADOW E&M-EST. PATIENT-LVL V: ICD-10-PCS | Mod: PBBFAC,,, | Performed by: INTERNAL MEDICINE

## 2020-03-05 PROCEDURE — 3051F PR MOST RECENT HEMOGLOBIN A1C LEVEL 7.0 - < 8.0%: ICD-10-PCS | Mod: CPTII,S$GLB,, | Performed by: INTERNAL MEDICINE

## 2020-03-05 PROCEDURE — 3051F HG A1C>EQUAL 7.0%<8.0%: CPT | Mod: CPTII,S$GLB,, | Performed by: INTERNAL MEDICINE

## 2020-03-05 PROCEDURE — 1101F PT FALLS ASSESS-DOCD LE1/YR: CPT | Mod: CPTII,S$GLB,, | Performed by: INTERNAL MEDICINE

## 2020-03-05 PROCEDURE — 3074F PR MOST RECENT SYSTOLIC BLOOD PRESSURE < 130 MM HG: ICD-10-PCS | Mod: CPTII,S$GLB,, | Performed by: INTERNAL MEDICINE

## 2020-03-05 PROCEDURE — 1159F MED LIST DOCD IN RCRD: CPT | Mod: S$GLB,,, | Performed by: INTERNAL MEDICINE

## 2020-03-05 PROCEDURE — 93000 ELECTROCARDIOGRAM COMPLETE: CPT | Mod: S$GLB,,, | Performed by: INTERNAL MEDICINE

## 2020-03-05 PROCEDURE — 99999 PR PBB SHADOW E&M-EST. PATIENT-LVL V: CPT | Mod: PBBFAC,,, | Performed by: INTERNAL MEDICINE

## 2020-03-05 PROCEDURE — 99214 PR OFFICE/OUTPT VISIT, EST, LEVL IV, 30-39 MIN: ICD-10-PCS | Mod: S$GLB,,, | Performed by: INTERNAL MEDICINE

## 2020-03-05 PROCEDURE — 3078F DIAST BP <80 MM HG: CPT | Mod: CPTII,S$GLB,, | Performed by: INTERNAL MEDICINE

## 2020-03-05 PROCEDURE — 1126F PR PAIN SEVERITY QUANTIFIED, NO PAIN PRESENT: ICD-10-PCS | Mod: S$GLB,,, | Performed by: INTERNAL MEDICINE

## 2020-03-05 PROCEDURE — 99499 UNLISTED E&M SERVICE: CPT | Mod: HCNC,S$GLB,, | Performed by: INTERNAL MEDICINE

## 2020-03-05 PROCEDURE — 93000 EKG 12-LEAD: ICD-10-PCS | Mod: S$GLB,,, | Performed by: INTERNAL MEDICINE

## 2020-03-05 PROCEDURE — 1126F AMNT PAIN NOTED NONE PRSNT: CPT | Mod: S$GLB,,, | Performed by: INTERNAL MEDICINE

## 2020-03-05 PROCEDURE — 3078F PR MOST RECENT DIASTOLIC BLOOD PRESSURE < 80 MM HG: ICD-10-PCS | Mod: CPTII,S$GLB,, | Performed by: INTERNAL MEDICINE

## 2020-03-05 PROCEDURE — 99499 RISK ADDL DX/OHS AUDIT: ICD-10-PCS | Mod: HCNC,S$GLB,, | Performed by: INTERNAL MEDICINE

## 2020-03-05 PROCEDURE — 3074F SYST BP LT 130 MM HG: CPT | Mod: CPTII,S$GLB,, | Performed by: INTERNAL MEDICINE

## 2020-03-05 RX ORDER — NITROGLYCERIN 0.4 MG/1
0.4 TABLET SUBLINGUAL EVERY 5 MIN PRN
Qty: 25 TABLET | Refills: 11 | Status: SHIPPED | OUTPATIENT
Start: 2020-03-05 | End: 2022-03-27 | Stop reason: SDUPTHER

## 2020-03-11 ENCOUNTER — OFFICE VISIT (OUTPATIENT)
Dept: FAMILY MEDICINE | Facility: CLINIC | Age: 70
End: 2020-03-11
Payer: MEDICARE

## 2020-03-11 VITALS
WEIGHT: 200.75 LBS | DIASTOLIC BLOOD PRESSURE: 82 MMHG | SYSTOLIC BLOOD PRESSURE: 126 MMHG | OXYGEN SATURATION: 97 % | BODY MASS INDEX: 31.51 KG/M2 | TEMPERATURE: 98 F | HEIGHT: 67 IN | HEART RATE: 65 BPM

## 2020-03-11 DIAGNOSIS — Z00.00 ENCOUNTER FOR PREVENTIVE HEALTH EXAMINATION: Primary | ICD-10-CM

## 2020-03-11 DIAGNOSIS — I70.0 ABDOMINAL AORTIC ATHEROSCLEROSIS: ICD-10-CM

## 2020-03-11 DIAGNOSIS — E08.22 DIABETES MELLITUS DUE TO UNDERLYING CONDITION WITH STAGE 3 CHRONIC KIDNEY DISEASE, WITH LONG-TERM CURRENT USE OF INSULIN: ICD-10-CM

## 2020-03-11 DIAGNOSIS — N18.30 STAGE 3 CHRONIC KIDNEY DISEASE: ICD-10-CM

## 2020-03-11 DIAGNOSIS — E11.42 DIABETIC POLYNEUROPATHY ASSOCIATED WITH TYPE 2 DIABETES MELLITUS: ICD-10-CM

## 2020-03-11 DIAGNOSIS — E11.9 DM TYPE 2 WITHOUT RETINOPATHY: ICD-10-CM

## 2020-03-11 DIAGNOSIS — N18.30 DIABETES MELLITUS DUE TO UNDERLYING CONDITION WITH STAGE 3 CHRONIC KIDNEY DISEASE, WITH LONG-TERM CURRENT USE OF INSULIN: ICD-10-CM

## 2020-03-11 DIAGNOSIS — Z79.4 DIABETES MELLITUS DUE TO UNDERLYING CONDITION WITH STAGE 3 CHRONIC KIDNEY DISEASE, WITH LONG-TERM CURRENT USE OF INSULIN: ICD-10-CM

## 2020-03-11 DIAGNOSIS — I10 ESSENTIAL HYPERTENSION: ICD-10-CM

## 2020-03-11 DIAGNOSIS — M46.1 BILATERAL SACROILIITIS: ICD-10-CM

## 2020-03-11 PROBLEM — K56.600 PARTIAL SMALL BOWEL OBSTRUCTION: Status: RESOLVED | Noted: 2018-10-29 | Resolved: 2020-03-11

## 2020-03-11 PROCEDURE — G0439 PR MEDICARE ANNUAL WELLNESS SUBSEQUENT VISIT: ICD-10-PCS | Mod: S$GLB,,, | Performed by: NURSE PRACTITIONER

## 2020-03-11 PROCEDURE — 99999 PR PBB SHADOW E&M-EST. PATIENT-LVL V: CPT | Mod: PBBFAC,,, | Performed by: NURSE PRACTITIONER

## 2020-03-11 PROCEDURE — 3051F HG A1C>EQUAL 7.0%<8.0%: CPT | Mod: CPTII,S$GLB,, | Performed by: NURSE PRACTITIONER

## 2020-03-11 PROCEDURE — 3079F PR MOST RECENT DIASTOLIC BLOOD PRESSURE 80-89 MM HG: ICD-10-PCS | Mod: CPTII,S$GLB,, | Performed by: NURSE PRACTITIONER

## 2020-03-11 PROCEDURE — 3074F PR MOST RECENT SYSTOLIC BLOOD PRESSURE < 130 MM HG: ICD-10-PCS | Mod: CPTII,S$GLB,, | Performed by: NURSE PRACTITIONER

## 2020-03-11 PROCEDURE — 3079F DIAST BP 80-89 MM HG: CPT | Mod: CPTII,S$GLB,, | Performed by: NURSE PRACTITIONER

## 2020-03-11 PROCEDURE — 99999 PR PBB SHADOW E&M-EST. PATIENT-LVL V: ICD-10-PCS | Mod: PBBFAC,,, | Performed by: NURSE PRACTITIONER

## 2020-03-11 PROCEDURE — 99499 RISK ADDL DX/OHS AUDIT: ICD-10-PCS | Mod: S$GLB,,, | Performed by: NURSE PRACTITIONER

## 2020-03-11 PROCEDURE — G0439 PPPS, SUBSEQ VISIT: HCPCS | Mod: S$GLB,,, | Performed by: NURSE PRACTITIONER

## 2020-03-11 PROCEDURE — 3074F SYST BP LT 130 MM HG: CPT | Mod: CPTII,S$GLB,, | Performed by: NURSE PRACTITIONER

## 2020-03-11 PROCEDURE — 99499 UNLISTED E&M SERVICE: CPT | Mod: S$GLB,,, | Performed by: NURSE PRACTITIONER

## 2020-03-11 PROCEDURE — 3051F PR MOST RECENT HEMOGLOBIN A1C LEVEL 7.0 - < 8.0%: ICD-10-PCS | Mod: CPTII,S$GLB,, | Performed by: NURSE PRACTITIONER

## 2020-03-11 NOTE — PATIENT INSTRUCTIONS
Counseling and Referral of Other Preventative  (Italic type indicates deductible and co-insurance are waived)    Patient Name: Driss Hernandez  Today's Date: 3/11/2020    Health Maintenance       Date Due Completion Date    Shingles Vaccine (1 of 2) 10/22/1999 Patient aware of recommendation for shingles vaccine.     Foot Exam 03/13/2019 3/13/2018 (Done)    Override on 3/13/2018: Done (patient deferred, reports it was done by Dr. Agrawal (endocrinologist))    Override on 3/6/2017: Done (completed by Endocrinology (Dr. Agrawal's office))    Eye Exam 05/06/2020 5/6/2019    Override on 1/10/2017: Done (External eye doctor)    Lipid Panel 01/03/2021 1/3/2020    Hemoglobin A1c 01/03/2021 1/3/2020    Aspirin/Antiplatelet Therapy 03/05/2021 3/5/2020    Colonoscopy 02/21/2027 2/21/2017    TETANUS VACCINE 04/26/2028 4/26/2018 (Declined)    Override on 4/26/2018: Declined        No orders of the defined types were placed in this encounter.    The following information is provided to all patients.  This information is to help you find resources for any of the problems found today that may be affecting your health:                Living healthy guide: www.[facility].[place].gov      Understanding Diabetes: www.diabetes.org      Eating healthy: www.cdc.gov/healthyweight      CDC home safety checklist: www.cdc.gov/steadi/patient.html      Agency on Aging: www.goea.[place].gov      Alcoholics anonymous (AA): www.aa.org      Physical Activity: www.nam.nih.gov/cs7etnn      Tobacco use: www.quitwithusla.org

## 2020-03-11 NOTE — PROGRESS NOTES
"Driss Hernandez presented for a  Medicare AWV and comprehensive Health Risk Assessment today. The following components were reviewed and updated:    · Medical history  · Family History  · Social history  · Allergies and Current Medications  · Health Risk Assessment  · Health Maintenance  · Care Team     ** See Completed Assessments for Annual Wellness Visit within the encounter summary.**       The following assessments were completed:  · Living Situation  · CAGE  · Depression Screening  · Timed Get Up and Go  · Whisper Test  · Cognitive Function Screening  · Nutrition Screening  · ADL Screening  · PAQ Screening    Vitals:    03/11/20 0751   BP: 126/82   BP Location: Left arm   Patient Position: Sitting   BP Method: Medium (Manual)   Pulse: 65   Temp: 98.2 °F (36.8 °C)   TempSrc: Oral   SpO2: 97%   Weight: 91 kg (200 lb 11.7 oz)   Height: 5' 7" (1.702 m)     Body mass index is 31.44 kg/m².  Physical Exam   Constitutional: He is oriented to person, place, and time.   Cardiovascular: Normal rate, regular rhythm and normal heart sounds.   Pulses:       Dorsalis pedis pulses are 2+ on the right side, and 2+ on the left side.        Posterior tibial pulses are 2+ on the right side, and 2+ on the left side.   Pulmonary/Chest: Effort normal and breath sounds normal.   Musculoskeletal: Normal range of motion. He exhibits no edema.        Right foot: There is normal range of motion and no deformity.        Left foot: There is normal range of motion and no deformity.   Feet:   Right Foot:   Protective Sensation: 9 sites tested. 9 sites sensed.   Skin Integrity: Negative for ulcer, blister, skin breakdown, erythema, warmth, callus or dry skin.   Left Foot:   Protective Sensation: 9 sites tested. 9 sites sensed.   Skin Integrity: Negative for ulcer, blister, skin breakdown, erythema, warmth, callus or dry skin.   Neurological: He is alert and oriented to person, place, and time.   Skin: Skin is warm. Capillary refill takes less " than 2 seconds.   Psychiatric: He has a normal mood and affect. His behavior is normal. Thought content normal.   Vitals reviewed.    Foot examination completed using medical monofilament sensory testing screening tool 5.07/10g filament       Diagnoses and health risks identified today and associated recommendations/orders:    1. Encounter for preventive health examination  Education provided about preventive health examinations and procedures; addressed and discussed patient's health concerns. Additionally, reviewed medical record for risk factors and documented the results during this encounter.    2. Abdominal aortic atherosclerosis  Stable and monitored. Continue current treatment plan as previously prescribed.     3. Diabetes mellitus with neurological manifestations, uncontrolled  Education provided about diabetes, management of blood glucose with diet and activities, monitoring for worsening effects of diabetes.  Reviewed most recent Ha1c and informed patient of complications associated with uncontrolled diabetes.     4. Diabetic polyneuropathy associated with type 2 diabetes mellitus  Education provided about diabetes, management of blood glucose with diet and activities, monitoring for worsening effects of diabetes.  Reviewed most recent Ha1c and informed patient of complications associated with uncontrolled diabetes.     5. DM type 2 without retinopathy  Education provided about diabetes, management of blood glucose with diet and activities, monitoring for worsening effects of diabetes.  Reviewed most recent Ha1c and informed patient of complications associated with uncontrolled diabetes.     6. Diabetes mellitus due to underlying condition with stage 3 chronic kidney disease, with long-term current use of insulin  Education provided about diabetes, management of blood glucose with diet and activities, monitoring for worsening effects of diabetes.  Reviewed most recent Ha1c and informed patient of  complications associated with uncontrolled diabetes.     7. Stage 3 chronic kidney disease  Stable, patient evaluated/monitored by [facility] nephrology dept; continue as advised.     8. Bilateral sacroiliitis  Stable and monitored. Continue current treatment plan as previously prescribed.     9. Essential hypertension  Presently at goal. Continue as advised regarding dietary and lifestyle modifications.     Reviewed health maintenance, educated about recommended examinations, procedures (labs & images), and immunizations.       Provided Driss with a 5-10 year written screening schedule and personal prevention plan. Recommendations were developed using the USPSTF age appropriate recommendations. Education, counseling, and referrals were provided as needed. After Visit Summary printed and given to patient which includes a list of additional screenings\tests needed.    Follow up in about 1 year (around 3/11/2021) for assessment .    Alan Salazar Jr, NP  I offered to discuss end of life issues, including information on how to make advance directives that the patient could use to name someone who would make medical decisions on their behalf if they became too ill to make themselves.    ___Patient declined  _X_Patient is interested, I provided paper work and offered to discuss.

## 2020-03-18 ENCOUNTER — HOSPITAL ENCOUNTER (OUTPATIENT)
Dept: CARDIOLOGY | Facility: HOSPITAL | Age: 70
Discharge: HOME | End: 2020-03-18
Attending: INTERNAL MEDICINE
Payer: MEDICARE

## 2020-03-18 ENCOUNTER — HOSPITAL ENCOUNTER (OUTPATIENT)
Dept: RADIOLOGY | Facility: HOSPITAL | Age: 70
Discharge: HOME | End: 2020-03-18
Attending: INTERNAL MEDICINE
Payer: MEDICARE

## 2020-03-18 VITALS — HEIGHT: 67 IN | BODY MASS INDEX: 31.39 KG/M2 | WEIGHT: 200 LBS

## 2020-03-18 DIAGNOSIS — I10 ESSENTIAL HYPERTENSION: ICD-10-CM

## 2020-03-18 DIAGNOSIS — I25.10 CORONARY ARTERY DISEASE INVOLVING NATIVE CORONARY ARTERY OF NATIVE HEART WITHOUT ANGINA PECTORIS: ICD-10-CM

## 2020-03-18 DIAGNOSIS — R07.89 CHEST PAIN, ATYPICAL: ICD-10-CM

## 2020-03-18 DIAGNOSIS — Z95.1 HX OF CABG: ICD-10-CM

## 2020-03-18 DIAGNOSIS — E78.2 MIXED HYPERLIPIDEMIA: ICD-10-CM

## 2020-03-18 LAB
AORTIC ROOT ANNULUS: 3.2 CM
AORTIC VALVE CUSP SEPERATION: 1.98 CM
ASCENDING AORTA: 3.11 CM
AV INDEX (PROSTH): 0.76
AV MEAN GRADIENT: 3 MMHG
AV PEAK GRADIENT: 5 MMHG
AV VALVE AREA: 2.77 CM2
AV VELOCITY RATIO: 0.63
BSA FOR ECHO PROCEDURE: 2.07 M2
CV ECHO LV RWT: 0.45 CM
CV STRESS BASE HR: 62 BPM
DIASTOLIC BLOOD PRESSURE: 81 MMHG
DOP CALC AO PEAK VEL: 1.13 M/S
DOP CALC AO VTI: 23.96 CM
DOP CALC LVOT AREA: 3.6 CM2
DOP CALC LVOT DIAMETER: 2.15 CM
DOP CALC LVOT PEAK VEL: 0.71 M/S
DOP CALC LVOT STROKE VOLUME: 66.26 CM3
DOP CALCLVOT PEAK VEL VTI: 18.26 CM
E WAVE DECELERATION TIME: 214.4 MSEC
E/A RATIO: 1.08
E/E' RATIO: 11.86 M/S
ECHO LV POSTERIOR WALL: 1.08 CM (ref 0.6–1.1)
FRACTIONAL SHORTENING: 34 % (ref 28–44)
INTERVENTRICULAR SEPTUM: 1.09 CM (ref 0.6–1.1)
IVRT: 101.5 MSEC
LA MAJOR: 5.36 CM
LA MINOR: 5.52 CM
LA WIDTH: 3.41 CM
LEFT ATRIUM SIZE: 4.57 CM
LEFT ATRIUM VOLUME INDEX: 35.6 ML/M2
LEFT ATRIUM VOLUME: 72.04 CM3
LEFT INTERNAL DIMENSION IN SYSTOLE: 3.17 CM (ref 2.1–4)
LEFT VENTRICLE DIASTOLIC VOLUME INDEX: 54 ML/M2
LEFT VENTRICLE DIASTOLIC VOLUME: 109.19 ML
LEFT VENTRICLE MASS INDEX: 95 G/M2
LEFT VENTRICLE SYSTOLIC VOLUME INDEX: 19.8 ML/M2
LEFT VENTRICLE SYSTOLIC VOLUME: 40 ML
LEFT VENTRICULAR INTERNAL DIMENSION IN DIASTOLE: 4.83 CM (ref 3.5–6)
LEFT VENTRICULAR MASS: 192.23 G
LV LATERAL E/E' RATIO: 9.22 M/S
LV SEPTAL E/E' RATIO: 16.6 M/S
MV PEAK A VEL: 0.77 M/S
MV PEAK E VEL: 0.83 M/S
NUC STRESS DIASTOLIC VOLUME INDEX: 93
NUC STRESS EJECTION FRACTION: 64 %
NUC STRESS SYSTOLIC VOLUME INDEX: 34
OHS CV CPX 85 PERCENT MAX PREDICTED HEART RATE MALE: 128
OHS CV CPX MAX PREDICTED HEART RATE: 150
OHS CV CPX PATIENT IS FEMALE: 0
OHS CV CPX PATIENT IS MALE: 1
OHS CV CPX PEAK DIASTOLIC BLOOD PRESSURE: 59 MMHG
OHS CV CPX PEAK HEAR RATE: 79 BPM
OHS CV CPX PEAK RATE PRESSURE PRODUCT: NORMAL
OHS CV CPX PEAK SYSTOLIC BLOOD PRESSURE: 141 MMHG
OHS CV CPX PERCENT MAX PREDICTED HEART RATE ACHIEVED: 53
OHS CV CPX RATE PRESSURE PRODUCT PRESENTING: 8990
PISA TR MAX VEL: 1.19 M/S
PV PEAK VELOCITY: 0.87 CM/S
RA MAJOR: 4.87 CM
RA WIDTH: 3.27 CM
RIGHT VENTRICULAR END-DIASTOLIC DIMENSION: 2.92 CM
RV TISSUE DOPPLER FREE WALL SYSTOLIC VELOCITY 1 (APICAL 4 CHAMBER VIEW): 10.89 CM/S
SINUS: 3.16 CM
STJ: 2.09 CM
STRESS ECHO TARGET HR: 128 BPM
SYSTOLIC BLOOD PRESSURE: 145 MMHG
TDI LATERAL: 0.09 M/S
TDI SEPTAL: 0.05 M/S
TDI: 0.07 M/S
TR MAX PG: 6 MMHG
TRICUSPID ANNULAR PLANE SYSTOLIC EXCURSION: 1.95 CM

## 2020-03-18 PROCEDURE — 93306 ECHO (CUPID ONLY): ICD-10-PCS | Mod: 26,HCNC,, | Performed by: INTERNAL MEDICINE

## 2020-03-18 PROCEDURE — 93306 TTE W/DOPPLER COMPLETE: CPT | Mod: 26,HCNC,, | Performed by: INTERNAL MEDICINE

## 2020-03-18 PROCEDURE — 93018 CV STRESS TEST I&R ONLY: CPT | Mod: HCNC,,, | Performed by: INTERNAL MEDICINE

## 2020-03-18 PROCEDURE — 93017 CV STRESS TEST TRACING ONLY: CPT | Mod: HCNC

## 2020-03-18 PROCEDURE — 78452 HT MUSCLE IMAGE SPECT MULT: CPT | Mod: 26,HCNC,, | Performed by: INTERNAL MEDICINE

## 2020-03-18 PROCEDURE — 93016 CV STRESS TEST SUPVJ ONLY: CPT | Mod: HCNC,,, | Performed by: INTERNAL MEDICINE

## 2020-03-18 PROCEDURE — A9502 TC99M TETROFOSMIN: HCPCS | Mod: HCNC

## 2020-03-18 PROCEDURE — 78452 STRESS TEST WITH MYOCARDIAL PERFUSION (CUPID ONLY): ICD-10-PCS | Mod: 26,HCNC,, | Performed by: INTERNAL MEDICINE

## 2020-03-18 PROCEDURE — 93016 STRESS TEST WITH MYOCARDIAL PERFUSION (CUPID ONLY): ICD-10-PCS | Mod: HCNC,,, | Performed by: INTERNAL MEDICINE

## 2020-03-18 PROCEDURE — C8929 TTE W OR WO FOL WCON,DOPPLER: HCPCS | Mod: HCNC

## 2020-03-18 PROCEDURE — 63600175 PHARM REV CODE 636 W HCPCS: Mod: HCNC | Performed by: INTERNAL MEDICINE

## 2020-03-18 PROCEDURE — 93018 STRESS TEST WITH MYOCARDIAL PERFUSION (CUPID ONLY): ICD-10-PCS | Mod: HCNC,,, | Performed by: INTERNAL MEDICINE

## 2020-03-18 RX ORDER — REGADENOSON 0.08 MG/ML
0.4 INJECTION, SOLUTION INTRAVENOUS ONCE
Status: COMPLETED | OUTPATIENT
Start: 2020-03-18 | End: 2020-03-18

## 2020-03-18 RX ADMIN — REGADENOSON 0.4 MG: 0.08 INJECTION, SOLUTION INTRAVENOUS at 08:03

## 2020-04-02 ENCOUNTER — PATIENT MESSAGE (OUTPATIENT)
Dept: CARDIOLOGY | Facility: CLINIC | Age: 70
End: 2020-04-02

## 2020-06-05 DIAGNOSIS — E11.9 DM TYPE 2 WITHOUT RETINOPATHY: ICD-10-CM

## 2020-06-28 DIAGNOSIS — K21.9 GASTROESOPHAGEAL REFLUX DISEASE, ESOPHAGITIS PRESENCE NOT SPECIFIED: ICD-10-CM

## 2020-06-29 RX ORDER — OMEPRAZOLE 20 MG/1
20 CAPSULE, DELAYED RELEASE ORAL
Qty: 90 CAPSULE | Refills: 3 | Status: SHIPPED | OUTPATIENT
Start: 2020-06-29 | End: 2020-10-28

## 2020-06-29 NOTE — TELEPHONE ENCOUNTER
Patient last office visit was 03/11/2020.    Please address patient's refill request.    Thanks,  Owen

## 2020-08-03 RX ORDER — GABAPENTIN 100 MG/1
CAPSULE ORAL
Qty: 450 CAPSULE | Refills: 0 | Status: SHIPPED | OUTPATIENT
Start: 2020-08-03 | End: 2022-03-27 | Stop reason: SDUPTHER

## 2020-08-18 ENCOUNTER — OFFICE VISIT (OUTPATIENT)
Dept: CARDIOLOGY | Facility: CLINIC | Age: 70
End: 2020-08-18
Payer: MEDICARE

## 2020-08-18 ENCOUNTER — OFFICE VISIT (OUTPATIENT)
Dept: FAMILY MEDICINE | Facility: CLINIC | Age: 70
End: 2020-08-18
Payer: MEDICARE

## 2020-08-18 ENCOUNTER — HOSPITAL ENCOUNTER (OUTPATIENT)
Dept: RADIOLOGY | Facility: HOSPITAL | Age: 70
Discharge: HOME | End: 2020-08-18
Attending: INTERNAL MEDICINE
Payer: MEDICARE

## 2020-08-18 ENCOUNTER — PATIENT OUTREACH (OUTPATIENT)
Dept: ADMINISTRATIVE | Facility: OTHER | Age: 70
End: 2020-08-18

## 2020-08-18 VITALS
HEART RATE: 75 BPM | BODY MASS INDEX: 32.35 KG/M2 | DIASTOLIC BLOOD PRESSURE: 82 MMHG | WEIGHT: 206.13 LBS | HEIGHT: 67 IN | TEMPERATURE: 99 F | SYSTOLIC BLOOD PRESSURE: 130 MMHG

## 2020-08-18 VITALS
BODY MASS INDEX: 32.18 KG/M2 | DIASTOLIC BLOOD PRESSURE: 72 MMHG | OXYGEN SATURATION: 98 % | HEIGHT: 67 IN | SYSTOLIC BLOOD PRESSURE: 120 MMHG | HEART RATE: 70 BPM | WEIGHT: 205 LBS

## 2020-08-18 DIAGNOSIS — I10 ESSENTIAL HYPERTENSION: ICD-10-CM

## 2020-08-18 DIAGNOSIS — R06.01 ORTHOPNEA: ICD-10-CM

## 2020-08-18 DIAGNOSIS — E11.42 DIABETIC POLYNEUROPATHY ASSOCIATED WITH TYPE 2 DIABETES MELLITUS: ICD-10-CM

## 2020-08-18 DIAGNOSIS — R06.02 SOB (SHORTNESS OF BREATH): ICD-10-CM

## 2020-08-18 DIAGNOSIS — I70.0 ABDOMINAL AORTIC ATHEROSCLEROSIS: ICD-10-CM

## 2020-08-18 DIAGNOSIS — R07.89 CHEST PRESSURE: ICD-10-CM

## 2020-08-18 DIAGNOSIS — R79.89 ABNORMAL TSH: ICD-10-CM

## 2020-08-18 DIAGNOSIS — Z95.1 HX OF CABG: ICD-10-CM

## 2020-08-18 DIAGNOSIS — I25.10 CORONARY ARTERY DISEASE INVOLVING NATIVE CORONARY ARTERY OF NATIVE HEART WITHOUT ANGINA PECTORIS: ICD-10-CM

## 2020-08-18 DIAGNOSIS — R07.89 CHEST PAIN, ATYPICAL: Primary | ICD-10-CM

## 2020-08-18 DIAGNOSIS — N40.0 BENIGN PROSTATIC HYPERPLASIA, UNSPECIFIED WHETHER LOWER URINARY TRACT SYMPTOMS PRESENT: ICD-10-CM

## 2020-08-18 DIAGNOSIS — E78.00 PURE HYPERCHOLESTEROLEMIA: ICD-10-CM

## 2020-08-18 DIAGNOSIS — N18.30 STAGE 3 CHRONIC KIDNEY DISEASE: ICD-10-CM

## 2020-08-18 DIAGNOSIS — F13.20 SEDATIVE DEPENDENCE: ICD-10-CM

## 2020-08-18 PROCEDURE — 99999 PR PBB SHADOW E&M-EST. PATIENT-LVL V: CPT | Mod: PBBFAC,HCNC,, | Performed by: INTERNAL MEDICINE

## 2020-08-18 PROCEDURE — 3008F BODY MASS INDEX DOCD: CPT | Mod: HCNC,CPTII,S$GLB, | Performed by: INTERNAL MEDICINE

## 2020-08-18 PROCEDURE — 3051F PR MOST RECENT HEMOGLOBIN A1C LEVEL 7.0 - < 8.0%: ICD-10-PCS | Mod: HCNC,CPTII,S$GLB, | Performed by: INTERNAL MEDICINE

## 2020-08-18 PROCEDURE — 3078F DIAST BP <80 MM HG: CPT | Mod: HCNC,CPTII,S$GLB, | Performed by: INTERNAL MEDICINE

## 2020-08-18 PROCEDURE — 99499 UNLISTED E&M SERVICE: CPT | Mod: HCNC,S$GLB,, | Performed by: INTERNAL MEDICINE

## 2020-08-18 PROCEDURE — 1125F PR PAIN SEVERITY QUANTIFIED, PAIN PRESENT: ICD-10-PCS | Mod: HCNC,S$GLB,, | Performed by: INTERNAL MEDICINE

## 2020-08-18 PROCEDURE — 1101F PT FALLS ASSESS-DOCD LE1/YR: CPT | Mod: HCNC,CPTII,S$GLB, | Performed by: INTERNAL MEDICINE

## 2020-08-18 PROCEDURE — 99214 PR OFFICE/OUTPT VISIT, EST, LEVL IV, 30-39 MIN: ICD-10-PCS | Mod: HCNC,S$GLB,, | Performed by: INTERNAL MEDICINE

## 2020-08-18 PROCEDURE — 1159F PR MEDICATION LIST DOCUMENTED IN MEDICAL RECORD: ICD-10-PCS | Mod: HCNC,S$GLB,, | Performed by: INTERNAL MEDICINE

## 2020-08-18 PROCEDURE — 3075F SYST BP GE 130 - 139MM HG: CPT | Mod: HCNC,CPTII,S$GLB, | Performed by: INTERNAL MEDICINE

## 2020-08-18 PROCEDURE — 71046 X-RAY EXAM CHEST 2 VIEWS: CPT | Mod: TC,HCNC,FY,PO

## 2020-08-18 PROCEDURE — 1125F AMNT PAIN NOTED PAIN PRSNT: CPT | Mod: HCNC,S$GLB,, | Performed by: INTERNAL MEDICINE

## 2020-08-18 PROCEDURE — 99999 PR PBB SHADOW E&M-EST. PATIENT-LVL V: ICD-10-PCS | Mod: PBBFAC,HCNC,, | Performed by: INTERNAL MEDICINE

## 2020-08-18 PROCEDURE — 3008F PR BODY MASS INDEX (BMI) DOCUMENTED: ICD-10-PCS | Mod: HCNC,CPTII,S$GLB, | Performed by: INTERNAL MEDICINE

## 2020-08-18 PROCEDURE — 93000 ELECTROCARDIOGRAM COMPLETE: CPT | Mod: HCNC,S$GLB,, | Performed by: INTERNAL MEDICINE

## 2020-08-18 PROCEDURE — 93000 EKG 12-LEAD: ICD-10-PCS | Mod: HCNC,S$GLB,, | Performed by: INTERNAL MEDICINE

## 2020-08-18 PROCEDURE — 3051F HG A1C>EQUAL 7.0%<8.0%: CPT | Mod: HCNC,CPTII,S$GLB, | Performed by: INTERNAL MEDICINE

## 2020-08-18 PROCEDURE — 99215 OFFICE O/P EST HI 40 MIN: CPT | Mod: HCNC,S$GLB,, | Performed by: INTERNAL MEDICINE

## 2020-08-18 PROCEDURE — 99215 PR OFFICE/OUTPT VISIT, EST, LEVL V, 40-54 MIN: ICD-10-PCS | Mod: HCNC,S$GLB,, | Performed by: INTERNAL MEDICINE

## 2020-08-18 PROCEDURE — 71046 XR CHEST PA AND LATERAL: ICD-10-PCS | Mod: 26,HCNC,, | Performed by: RADIOLOGY

## 2020-08-18 PROCEDURE — 3074F PR MOST RECENT SYSTOLIC BLOOD PRESSURE < 130 MM HG: ICD-10-PCS | Mod: HCNC,CPTII,S$GLB, | Performed by: INTERNAL MEDICINE

## 2020-08-18 PROCEDURE — 3075F PR MOST RECENT SYSTOLIC BLOOD PRESS GE 130-139MM HG: ICD-10-PCS | Mod: HCNC,CPTII,S$GLB, | Performed by: INTERNAL MEDICINE

## 2020-08-18 PROCEDURE — 99499 RISK ADDL DX/OHS AUDIT: ICD-10-PCS | Mod: HCNC,S$GLB,, | Performed by: INTERNAL MEDICINE

## 2020-08-18 PROCEDURE — 1159F MED LIST DOCD IN RCRD: CPT | Mod: HCNC,S$GLB,, | Performed by: INTERNAL MEDICINE

## 2020-08-18 PROCEDURE — 71046 X-RAY EXAM CHEST 2 VIEWS: CPT | Mod: 26,HCNC,, | Performed by: RADIOLOGY

## 2020-08-18 PROCEDURE — 3078F PR MOST RECENT DIASTOLIC BLOOD PRESSURE < 80 MM HG: ICD-10-PCS | Mod: HCNC,CPTII,S$GLB, | Performed by: INTERNAL MEDICINE

## 2020-08-18 PROCEDURE — 3079F DIAST BP 80-89 MM HG: CPT | Mod: HCNC,CPTII,S$GLB, | Performed by: INTERNAL MEDICINE

## 2020-08-18 PROCEDURE — 1101F PR PT FALLS ASSESS DOC 0-1 FALLS W/OUT INJ PAST YR: ICD-10-PCS | Mod: HCNC,CPTII,S$GLB, | Performed by: INTERNAL MEDICINE

## 2020-08-18 PROCEDURE — 3079F PR MOST RECENT DIASTOLIC BLOOD PRESSURE 80-89 MM HG: ICD-10-PCS | Mod: HCNC,CPTII,S$GLB, | Performed by: INTERNAL MEDICINE

## 2020-08-18 PROCEDURE — 99214 OFFICE O/P EST MOD 30 MIN: CPT | Mod: HCNC,S$GLB,, | Performed by: INTERNAL MEDICINE

## 2020-08-18 PROCEDURE — 3074F SYST BP LT 130 MM HG: CPT | Mod: HCNC,CPTII,S$GLB, | Performed by: INTERNAL MEDICINE

## 2020-08-18 RX ORDER — TRIAZOLAM 0.25 MG/1
TABLET ORAL
Qty: 30 TABLET | Refills: 3 | Status: SHIPPED | OUTPATIENT
Start: 2020-08-18 | End: 2022-01-13 | Stop reason: SDUPTHER

## 2020-08-18 NOTE — PROGRESS NOTES
Chief complaint:   discuss issues,    69-year-old white male with uncontrolled diabetes which he says is worse due to dietary issues related to the pandemic.  He needs to get a new endocrinologist as his one at [place] now works for [place] insurance requires him to see [facility].  We discussed some local options.  We will update all his blood work    For the past couple of months he has had some coughing and wheezing.  Using when he lays on his side but not on his back.  He has also had what could be orthopnea with having to sit up due to shortness of breath.  He has had cough and feel some mucus in the throat but really not productive.  No fevers or chills.  He still cutting his grass and yesterday cut his and his neighbor's yd which is usually no probably did have to stop several times due to some shortness of breath.  No chest pain however but he has had a pressure in his chest off and on for months.  He has an appointment with his cardiologist this afternoon.  He has never smoked and does not have any pre-existing lung disease.  We discussed updating lab work, assessing for CHF, pneumonia and so forth.  He is not on any diuretics.  He has had no edema.    He also occasionally take Halcion as Ambien we give him a hangover.  He does use Zanaflex at night to help him sleep.  We discussed the amnesia issues related to how she on any takes it rarely so all provided with prescription.      Nuc and Echo ok 3/20    Regarding diabetes he is managed by outside Endocrine.  His A1c has been uncontrolled chronically -8.4 in may, 7.7 august..  He brings an outside records and his A1c improved to 7.7 as of the end of August..  We discussed diet improvement.  He has chosen to use regular insulin due to cost.  We discussed that it may well need to be taken 30 min before the meal and could last for hours and he was not aware of that.  He can discuss in greater detail with his endocrinologist.  Continues on Lantus 20.   He is also on a weekly injection.  He was thinking about stopping it but encouraged him to do so as it may well be helping with his appetite over eating.    Apparently on a weekly injection and was discontinued due to the high triglycerides but I pointed out that is triglyceride elevation has been something that has fluctuated over the years and more so relates to diet and his uncontrolled diabetes.  He is now on fish oil as well as another pill for the high triglycerides.  The official may have been added lately.  He is tolerating it with  continued reflux and we discussed trying a different preparation.  He has already put them in the freezer..  He will need reassessment in a couple of months.    The other issue is an elevated TSH.  It looks like his TSH went up into the seven range and then a repeat in October was down to 5.2.  I explained hypothyroidism to him at length.  He had a daughter who developed at age 17.  We discussed that it will not be a significant health issue for him to remain hypothyroid until he figures this out.  He would prefer not to take medication and it does look like the TSH is trending down it did this in the past as well.  He will have an appointment early January with labs with his Endocrine and I agree with that plan.      Patient counseled at length regarding the multitude of all these issues above and it was therapeutic for him to go over all these issues.Total time over 45 minutes with over 50% counseling.    ROS:   CONST: weight stable.  No further GI symptoms except as above, no edema, no allergy symptoms, no URI symptoms      Past medical history:  1.  Uncontrolled diabetes with neuropathy, followed by Dr. Agrawal  2.  Coronary artery disease with triple bypass March 2016, followed by the heart clinic. Now [facility], neg PET 2017, Nuc/echo neg 3/20,  3.  Back surgery 2002.  5.  Hyperlipidemia.  6.  Hypertension.  7.  Never had colonoscopy  8.  Pneumovax and Prevnar given 2015  according to records  9.  Right leg radiculopathy, confirmed by MRI, did respond epidural with [facility] pain management  10. Partial small-bowel obstruction 2018  11.  Abnormal TSH on occasion    Family history: Father  at 77 with stroke.  Mother  at 72 with heart problems, diabetes, hypertension.  2 sisters living in their 80s and one  at 82.  One brother  at 80 with some kidney disease and diabetes.  Sisters with diabetes, hypertension and stroke.  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  He drinks alcohol about 3 times.  Never smoked.      Vital signs as above,   Gen: no distress  EYES: conjunctiva clear, non-icteric, PERRL  ENT: nose clear, nasal mucosa normal, oropharynx clear and moist, teeth good  NECK:supple, thyroid non-palpable  RESP: effort is good, lungs clear  CV: heart RRR w/o murmur, gallops or rubs; no carotid bruits, no edema  GI: abdomen soft, non-distended, non-tender, no hepatosplenomegaly  MS: gait normal, no clubbing or cyanosis of the digits  SKIN: no rashes, warm to touch    Driss was seen today for cough.    Diagnoses and all orders for this visit:    Diabetes mellitus with neurological manifestations, uncontrolled, apparently worse by his own monitoring, refer to new Endocrine, check A1c  -     Hemoglobin A1C; Future  -     Comprehensive metabolic panel; Future  -     Brain Natriuretic Peptide; Future  -     Lipid Panel; Future  -     CBC auto differential; Future  -     Ambulatory referral/consult to Endocrinology; Future    Diabetic polyneuropathy associated with type 2 diabetes mellitus    Stage 3 chronic kidney disease, reassess    Essential hypertension, chronic and stable    Abnormal TSH, followed previously by Endocrine and unsure how much it has been check lately    SOB (shortness of breath), new progressive cough and shortness of breath with some  "wheezing over the last couple of months and some features of orthopnea.  Need to consider cardiopulmonary causes.  He has an appoint with Cardiology today.  Check chest x-ray BNP level, CBC and so forth.  If it is suspected he might have more of a CHF component he may need a repeat ischemic assessment, echo and possibly some Lasix.  If we get surprise that he has pneumonia or something I will treat accordingly.  Obviously consider COVID but he really has had no fevers but obviously this could be lingering affects from COVID  -     Brain Natriuretic Peptide; Future  -     X-Ray Chest PA And Lateral; Future    Chest pressure  -     Brain Natriuretic Peptide; Future  -     X-Ray Chest PA And Lateral; Future    Orthopnea  -     Brain Natriuretic Peptide; Future  -     X-Ray Chest PA And Lateral; Future    Benign prostatic hyperplasia, unspecified whether lower urinary tract symptoms present, looks like last PSA in our system was 2017 and he does not have Urology.  -     PSA, total and free; Future    Sedative dependence    Other orders  -     triazolam (HALCION) 0.25 MG Tab; TK 1 T PO QHS, prn         Clinical note be sensitive based upon his anxiety referable to these issues.  "This note will not be shared with the patient."  "

## 2020-08-18 NOTE — PROGRESS NOTES
Subjective:    Patient ID:  Driss Hernandez Jr. is a 69 y.o. male who presents for follow-up of Chest Pain      HPI     CAD/CABG x 3 2016 WJ, HTN, DM, HLD     Previously followed by Dr Hernandez  Here follow-up of coronary artery disease.  He denies any worsening cardiopulmonary complaints.  He's not expressing chest pain, shortness of breath or palpitations.  His main limitation stools his back and he's been taking muscle relaxers to help him sleep at night.  He denies any PND, orthopnea or lower edema.  He's not expressing dizziness, presyncope or syncope.  Says he goes to the gym 4 times a week with his wife.  Otherwise been in his usual state of health.     Here for follow-up of coronary artery disease.  He is here for follow-up as well to obtain preop clearance for back procedure including nerve block and possible sympathectomy by pain management.  He denies any cardiopulmonary complaints.  He has experienced no chest pain, shortness of breath or palpitations.  He denies any PND, orthopnea or lower extremity edema. He has not experienced any dizziness, presyncope or syncope.      PET stress: 7-17  CONCLUSIONS: NORMAL MYOCARDIAL PERFUSION PET STRESS TEST  1. The perfusion scan is free of evidence for myocardial ischemia or injury.   2. Resting wall motion is physiologic. Stress wall motion is physiologic.   3. LV function is normal at rest and stress.  (normal is >= 51%)  4. The ventricular volumes are normal at rest and stress.   5. The extracardiac distribution of radioactivity is normal.   6. There was no previous study available to compare.      Stress test 3/18/20    Probably normal joseph myocardial perfusion study.    There is a mild intensity fixed defect in the mid anterior wall of the left ventricle, secondary to attenuation.    Gated perfusion images showed an ejection fraction of 64% post stress.    There is normal wall motion post stress.     Echo 3/18/20  · Normal left ventricular systolic  function. The estimated ejection fraction is 60%.  · Concentric left ventricular remodeling.  · No wall motion abnormalities.  · Normal right ventricular systolic function.     Holter within normal limits     3/5/20 Recently having some sharp left sided CP - different from angina prior to CABG  EKG NSR 1st degree AVB - otherwise ok  Echo and lexiscan myoview for CP and known CAD     8/18/20 Saw Dr Willoughby today - c/o SOB - CXR clear. Labs pending  Reports a severe dry cough - denies fever. Some rib soreness from coughing  EKG NSR PVCs NSSTT changes    Review of Systems   Constitution: Negative for decreased appetite.   HENT: Negative for ear discharge.    Eyes: Negative for blurred vision.   Endocrine: Negative for polyphagia.   Skin: Negative for nail changes.   Genitourinary: Negative for bladder incontinence.   Neurological: Negative for aphonia.   Psychiatric/Behavioral: Negative for hallucinations.   Allergic/Immunologic: Negative for hives.        Objective:    Physical Exam   Constitutional: He is oriented to person, place, and time. He appears well-developed and well-nourished. No distress.   HENT:   Head: Normocephalic and atraumatic.   Eyes: Pupils are equal, round, and reactive to light. Conjunctivae and EOM are normal.   Neck: Normal range of motion. Neck supple. No thyromegaly present.   Cardiovascular: Normal rate, regular rhythm and normal heart sounds.   No murmur heard.  Pulmonary/Chest: Effort normal and breath sounds normal. No respiratory distress. He has no wheezes. He has no rales. He exhibits no tenderness.   Abdominal: Soft. Bowel sounds are normal.   Musculoskeletal:         General: No edema.   Neurological: He is alert and oriented to person, place, and time.   Skin: Skin is warm and dry.   Psychiatric: He has a normal mood and affect. His behavior is normal.         Assessment:       1. Chest pain, atypical    2. Hx of CABG    3. Essential hypertension    4. Pure hypercholesterolemia     5. Coronary artery disease involving native coronary artery of native heart without angina pectoris    6. Abdominal aortic atherosclerosis         Plan:       Suspect cough and SOB are from bronchitis and not CHF or myocardial ischemia. Recent echo and stress test ok  F/U BNP  If symptoms improve then OV 3 months. Consider LHC if symptoms persist without explanation

## 2020-08-18 NOTE — PROGRESS NOTES
Health Maintenance Due   Topic Date Due    Shingles Vaccine (1 of 2) 10/22/1999    Eye Exam  05/06/2020     Updates were requested from care everywhere.  Chart was reviewed for overdue Proactive [facility] Encounters (FAREED) topics (CRS, Breast Cancer Screening, Eye exam)  Health Maintenance has been updated.  LINKS immunization registry triggered.  Immunizations were reconciled.

## 2020-08-29 ENCOUNTER — TELEPHONE (OUTPATIENT)
Dept: CARDIOLOGY | Facility: CLINIC | Age: 70
End: 2020-08-29

## 2020-08-29 NOTE — TELEPHONE ENCOUNTER
----- Message from Meera Vega MA sent at 8/28/2020  3:32 PM CDT -----  Regarding: FW: abnormal EKG results    ----- Message -----  From: Adama Brito  Sent: 8/28/2020   3:13 PM CDT  To: [facility] Staff  Subject: abnormal EKG results                             Type: Patient Call Back    Who called:Driss    What is the request in detail: The patient is requesting a call back from Dr. Cuellar. He stated that when he did the last EKG, he was told by Dr. Cuellar that it is normal. He has now stated that he has received a document on [facility] stating that the results were abnormal. He would like clarification on the matter.    Can the clinic reply by [facility]?no    Would the patient rather a call back or a response via [facility]? Call back    Best call back number:[phone]

## 2020-09-09 ENCOUNTER — OFFICE VISIT (OUTPATIENT)
Dept: ENDOCRINOLOGY | Facility: CLINIC | Age: 70
End: 2020-09-09
Payer: MEDICARE

## 2020-09-09 VITALS
DIASTOLIC BLOOD PRESSURE: 77 MMHG | SYSTOLIC BLOOD PRESSURE: 154 MMHG | HEART RATE: 61 BPM | WEIGHT: 202.31 LBS | BODY MASS INDEX: 31.68 KG/M2 | TEMPERATURE: 99 F

## 2020-09-09 DIAGNOSIS — I25.10 CORONARY ARTERY DISEASE INVOLVING NATIVE CORONARY ARTERY OF NATIVE HEART WITHOUT ANGINA PECTORIS: ICD-10-CM

## 2020-09-09 DIAGNOSIS — E66.9 OBESITY (BMI 30.0-34.9): ICD-10-CM

## 2020-09-09 DIAGNOSIS — R40.20 LOSS OF CONSCIOUSNESS: ICD-10-CM

## 2020-09-09 DIAGNOSIS — E11.9 DIABETES MELLITUS WITHOUT COMPLICATION: ICD-10-CM

## 2020-09-09 PROCEDURE — 1126F PR PAIN SEVERITY QUANTIFIED, NO PAIN PRESENT: ICD-10-PCS | Mod: HCNC,S$GLB,, | Performed by: NURSE PRACTITIONER

## 2020-09-09 PROCEDURE — 99999 PR PBB SHADOW E&M-EST. PATIENT-LVL V: ICD-10-PCS | Mod: PBBFAC,HCNC,, | Performed by: NURSE PRACTITIONER

## 2020-09-09 PROCEDURE — 3052F PR MOST RECENT HEMOGLOBIN A1C LEVEL 8.0 - < 9.0%: ICD-10-PCS | Mod: HCNC,CPTII,S$GLB, | Performed by: NURSE PRACTITIONER

## 2020-09-09 PROCEDURE — 3008F BODY MASS INDEX DOCD: CPT | Mod: HCNC,CPTII,S$GLB, | Performed by: NURSE PRACTITIONER

## 2020-09-09 PROCEDURE — 3078F PR MOST RECENT DIASTOLIC BLOOD PRESSURE < 80 MM HG: ICD-10-PCS | Mod: HCNC,CPTII,S$GLB, | Performed by: NURSE PRACTITIONER

## 2020-09-09 PROCEDURE — 99999 PR PBB SHADOW E&M-EST. PATIENT-LVL V: CPT | Mod: PBBFAC,HCNC,, | Performed by: NURSE PRACTITIONER

## 2020-09-09 PROCEDURE — 99204 OFFICE O/P NEW MOD 45 MIN: CPT | Mod: HCNC,S$GLB,, | Performed by: NURSE PRACTITIONER

## 2020-09-09 PROCEDURE — 1159F PR MEDICATION LIST DOCUMENTED IN MEDICAL RECORD: ICD-10-PCS | Mod: HCNC,S$GLB,, | Performed by: NURSE PRACTITIONER

## 2020-09-09 PROCEDURE — 1101F PR PT FALLS ASSESS DOC 0-1 FALLS W/OUT INJ PAST YR: ICD-10-PCS | Mod: HCNC,CPTII,S$GLB, | Performed by: NURSE PRACTITIONER

## 2020-09-09 PROCEDURE — 3077F PR MOST RECENT SYSTOLIC BLOOD PRESSURE >= 140 MM HG: ICD-10-PCS | Mod: HCNC,CPTII,S$GLB, | Performed by: NURSE PRACTITIONER

## 2020-09-09 PROCEDURE — 1159F MED LIST DOCD IN RCRD: CPT | Mod: HCNC,S$GLB,, | Performed by: NURSE PRACTITIONER

## 2020-09-09 PROCEDURE — 1126F AMNT PAIN NOTED NONE PRSNT: CPT | Mod: HCNC,S$GLB,, | Performed by: NURSE PRACTITIONER

## 2020-09-09 PROCEDURE — 99204 PR OFFICE/OUTPT VISIT, NEW, LEVL IV, 45-59 MIN: ICD-10-PCS | Mod: HCNC,S$GLB,, | Performed by: NURSE PRACTITIONER

## 2020-09-09 PROCEDURE — 1101F PT FALLS ASSESS-DOCD LE1/YR: CPT | Mod: HCNC,CPTII,S$GLB, | Performed by: NURSE PRACTITIONER

## 2020-09-09 PROCEDURE — 3052F HG A1C>EQUAL 8.0%<EQUAL 9.0%: CPT | Mod: HCNC,CPTII,S$GLB, | Performed by: NURSE PRACTITIONER

## 2020-09-09 PROCEDURE — 3077F SYST BP >= 140 MM HG: CPT | Mod: HCNC,CPTII,S$GLB, | Performed by: NURSE PRACTITIONER

## 2020-09-09 PROCEDURE — 3078F DIAST BP <80 MM HG: CPT | Mod: HCNC,CPTII,S$GLB, | Performed by: NURSE PRACTITIONER

## 2020-09-09 PROCEDURE — 3008F PR BODY MASS INDEX (BMI) DOCUMENTED: ICD-10-PCS | Mod: HCNC,CPTII,S$GLB, | Performed by: NURSE PRACTITIONER

## 2020-09-09 RX ORDER — INSULIN GLARGINE 100 [IU]/ML
30 INJECTION, SOLUTION SUBCUTANEOUS EVERY MORNING
Qty: 15 ML | Refills: 2
Start: 2020-09-09 | End: 2021-09-20

## 2020-09-09 NOTE — LETTER
September 9, 2020      Jono Willoughby MD  [address] [place] [address]           [facility]  [address] [place] [address]  Phone: [phone]  Fax: [phone]          Patient: Driss Hernandez Jr.   MR Number: [id number]   YOB: 1950   Date of Visit: 9/9/2020       Dear Dr. Jono Willoughby:    Thank you for referring Driss Hernandez to me for evaluation. Attached you will find relevant portions of my assessment and plan of care.    If you have questions, please do not hesitate to call me. I look forward to following Driss Hernandez along with you.    Sincerely,    Janie Guajardo NP    Enclosure  CC:  No Recipients    If you would like to receive this communication electronically, please contact [email or web address] or [phone] to request more information on [facility] Link access.    For providers and/or their staff who would like to refer a patient to [facility], please contact us through our one-stop-shop provider referral line, [facility], at [phone].    If you feel you have received this communication in error or would no longer like to receive these types of communications, please e-mail [email or web address]

## 2020-09-09 NOTE — PROGRESS NOTES
"CC: This 69 y.o. White male  is here for evaluation of  T2DM along with comorbidities indicated in the Visit Diagnosis section of this encounter.    HPI: Driss Hernandez Jr. was diagnosed with T2DM in ~ 2005.     Medical history: CAD s/p CABG     New to Endocrine. Referred by Dr. Willoughby. Pt was under care of Dr. Gao and TARI Werner, NP, previously.     He stopped Ozempic a few months ago d/t cost when he fell into donut hole. Finds that his appetite and his BGs have worsened since then. Believes his biggest problem controlling diabetes comes down to diet. "i'm not going to stop eating." Thinks he knows what to do to improve diet but cannot maintain it long term and he gets discouraged when BGs are still high after eating better. C/o historically high fasting BGs, even when bedtime BGs are relatively controlled.       Pt recalls an episode a few days ago when he had severe lower abdominal pain and passed out a few seconds.       LAST DIABETES EDUCATION: never --     HOSPITALIZED FOR DIABETES  -  No.      PRESCRIBED DIABETES MEDICATIONS: metformin ER 1000 mg bid, Lantus Solostar 26 units every morning ~ 7:30 am, Novolin R 9 units ac with ss: 150-200=+2, 201-250=+4; 251-300=+6; 301-350=+8, over 350=+10 units      Misses medication doses - No  But injects Novolin R in the morning without eating.     DM COMPLICATIONS: peripheral neuropathy and cardiovascular disease    SELF MONITORING BLOOD GLUCOSE: Checks blood glucose at home 3-4x/day.           HYPOGLYCEMIC EPISODES: none      CURRENT DIET: drinks mainly milk - about 4 cups a day, and water. Eats 3 meals/day - doesn't like breakfast but eats it anyway - egg and 1 slice of toast.  And snacks. Likes sugar less candy.     Dinner last night was canneloni, 2 small ribs. Lunch yesterday was 2 slices of pizza which is rare for him.     CURRENT EXERCISE: goes to gym once weekly (prior to pandemic went 3x/week). Also cuts grass 3x/week.     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      BP (!) 154/77 (BP Location: Left arm, Patient Position: Sitting, BP Method: Large (Automatic))   Pulse 61   Temp 98.6 °F (37 °C) (Temporal)   Wt 91.8 kg (202 lb 4.8 oz)   BMI 31.68 kg/m²       ROS:   CONSTITUTIONAL: Appetite good, denies fatigue  SKIN: No rash or pruritis   EYES: No visual disturbances  RESPIRATORY: No shortness of breath or cough  CARDIAC: No chest pain or palpitations  GI: No nausea, vomiting, or diarrhea  : No urinary frequency or dysuria   MS: No arthralgias or mylagias   NEURO: + paresthesias to feet at night   OTHER: n/a         PHYSICAL EXAM:  GENERAL: Well developed, well nourished. No acute distress.   PSYCH: AAOx3, appropriate mood and affect, conversant, well-groomed. Judgement and insight good.   NEURO: Cranial nerves grossly intact. Speech clear, no tremor.   NECK: Trachea midline, no thyromegaly or lymphadenopathy.   CHEST: Respirations even and unlabored. CTA bilaterally.  CARDIOVASCULAR: Regular rate and rhythm. No bruits. No murmur. No edema.   ABDOMEN: Soft, non-tender, non-distended. Bowel sounds present.   MS: Gait steady. No clubbing.   SKIN: Normal skin turgor. Skin warm and dry. No areas of breakdown. No acanthosis nigricans.            Hemoglobin A1C   Date Value Ref Range Status   08/18/2020 8.2 (H) 4.0 - 5.6 % Final     Comment:     ADA Screening Guidelines:  5.7-6.4%  Consistent with prediabetes  >or=6.5%  Consistent with diabetes  High levels of fetal hemoglobin interfere with the HbA1C  assay. Heterozygous hemoglobin variants (HbS, HgC, etc)do  not significantly interfere with this assay.   However, presence of multiple variants may affect accuracy.     08/29/2019 7.7 % Final     Comment:     Ania Werner   05/15/2019 8.4 (H) 4.0 - 5.6 % Final     Comment:     ADA Screening Guidelines:  5.7-6.4%  Consistent with prediabetes  >or=6.5%  Consistent with diabetes  High levels of fetal hemoglobin interfere with the HbA1C  assay. Heterozygous  "hemoglobin variants (HbS, HgC, etc)do  not significantly interfere with this assay.   However, presence of multiple variants may affect accuracy.     02/22/2019 8.0 (H) 4.0 - 5.6 % Final     Comment:     ADA Screening Guidelines:  5.7-6.4%  Consistent with prediabetes  >or=6.5%  Consistent with diabetes  High levels of fetal hemoglobin interfere with the HbA1C  assay. Heterozygous hemoglobin variants (HbS, HgC, etc)do  not significantly interfere with this assay.   However, presence of multiple variants may affect accuracy.             Chemistry        Component Value Date/Time     08/18/2020 0928    K 5.1 08/18/2020 0928     08/18/2020 0928    CO2 23 08/18/2020 0928    BUN 27 (H) 08/18/2020 0928    CREATININE 1.2 08/18/2020 0928     (H) 08/18/2020 0928        Component Value Date/Time    CALCIUM 9.8 08/18/2020 0928    ALKPHOS 41 (L) 08/18/2020 0928    AST 18 08/18/2020 0928    ALT 18 08/18/2020 0928    BILITOT 1.1 (H) 08/18/2020 0928    ESTGFRAFRICA >60.0 08/18/2020 0928    EGFRNONAA >60.0 08/18/2020 0928          Lab Results   Component Value Date    LDLCALC 87.0 08/18/2020       Lab Results   Component Value Date    MICALBCREAT 4.9 02/22/2019           STANDARDS of CARE:        ASA:               Last eye exam:       ASSESSMENT and PLAN:    A1C GOAL:     1. Diabetes mellitus with neurological manifestations, uncontrolled  Will hold off on visit to dietician since patient believes that he knows what to do but has trouble maintaining restrictions. Reminded him that portion control was key, not "stopping everything."     He will hold off on this time on starting personal  Continuous Glucose Monitoring (CGM) like the Harrison as well as Ozempic due to cost. He is willing however to undergo CGM study through the clinic primarily to rule out Orly Phenomenon.     Increase Lantus from 26 to 30 units once daily.   Increase Novolin R to 11 units before each meal with sliding scale.   Continue metformin. "     Test glucose 4x/day.     Follow up for CGM review.               Ambulatory referral/consult to Endocrinology    GLUCOSE MONITORING CONTINUOUS MIN 72 HOURS   2. Coronary artery disease involving native coronary artery of native heart without angina pectoris  Improve glycemic control.   Followed by Dr. Cuellar.    3. Obesity (BMI 30.0-34.9)  Increases insulin resistance.      4. Diabetes mellitus without complication  LANTUS SOLOSTAR U-100 INSULIN glargine 100 units/mL (3mL) SubQ pen   5. Loss of consciousness  F/u with PCP        Orders Placed This Encounter   Procedures    GLUCOSE MONITORING CONTINUOUS MIN 72 HOURS     Standing Status:   Future     Standing Expiration Date:   9/10/2021        No follow-ups on file.     Thank you very much for allowing me to participate in Driss Hernandez Jr.'s care.

## 2020-09-19 NOTE — TELEPHONE ENCOUNTER
----- Message from Rosa Pedraza sent at 2/5/2018 12:55 PM CST -----  Contact: self  Pt states he is returning a call. Please call [phone].   
The patient has been scheduled with Dr. Betancourt, Nephrology at the [facility] clinic 3/13/18  
Alert and oriented to person, place and time

## 2020-09-28 DIAGNOSIS — E55.9 HYPOVITAMINOSIS D: Primary | ICD-10-CM

## 2020-09-28 RX ORDER — ERGOCALCIFEROL 1.25 MG/1
CAPSULE ORAL
Qty: 4 CAPSULE | Refills: 0 | Status: SHIPPED | OUTPATIENT
Start: 2020-09-28 | End: 2020-09-28

## 2020-09-28 NOTE — TELEPHONE ENCOUNTER
----- Message from Blessing Cool sent at 9/28/2020 12:23 PM CDT -----  Caller: Mrs. Hernandez  tel: [phone]    /   Pt. Needs    [facility]    /  [facility]  tel:  [phone]  / would like to get the 3 mos. Supply.     Pls send this in TODAY.

## 2020-09-28 NOTE — TELEPHONE ENCOUNTER
Please ask pt to get vitamin D checked when he comes back for CGM on appt. No recent vitamin D lab available.

## 2020-09-29 ENCOUNTER — PATIENT MESSAGE (OUTPATIENT)
Dept: OTHER | Facility: OTHER | Age: 70
End: 2020-09-29

## 2020-09-30 ENCOUNTER — CLINICAL SUPPORT (OUTPATIENT)
Dept: ENDOCRINOLOGY | Facility: CLINIC | Age: 70
End: 2020-09-30
Payer: MEDICARE

## 2020-09-30 NOTE — CONSULTS
"PLACEMENT OF FREESTYLE TIARA PRO SENSOR    CONTINOUS GLUCOSE MONITORING SYSTEM (CGMS)    Patient is here in clinic today for placement of continuous glucose monitoring sensor.    Patient verified that they were here for CGMS procedure ordered by their provider and that they have a working glucose meter and supplies at home.    Patient will be provided with a Freestyle Tiara Sensor and a copy of the Continuous Glucose Monitoring Patient Log to fill out during the study.    A detailed explanation of Continuous Glucose Monitoring was provided. Patient informed that this is a blind procedure and that they will not actually see the blood sugar tracing in real time. Reviewed with patient the patient education handout called "Freestyle Tiara Pro Sensor User Instructions" to review self-care during the study to avoid sensor loosening or removal ie... Bathing, Swimming, dressing, and exercising.    Instructed patient to check blood sugar using home glucometer and to record the following on provided patient log sheets: Blood sugar taken at home, Meals and snacks, Activity, and Diabetes medications taken and dosage    Patient was brought to a private location. Arm for insertion was selected and prepared and allowed to dry. Glucose Sensor Serial Number [id number] inserted to back of patient's Left upper arm.    The following forms were given and reviewed in detail with patient and all questions answered.    · Continuous Glucose Monitoring Patient Log #[id number]    · Freestyle Manufacture Patient Handout "Your Freestyle Tiara Pro Sensor: What you need to know"    "

## 2020-09-30 NOTE — PROGRESS NOTES
"  PLACEMENT OF FREESTYLE TIARA PRO SENSOR    CONTINOUS GLUCOSE MONITORING SYSTEM (CGMS)    Patient is here in clinic today for placement of continuous glucose monitoring sensor.    Patient verified that they were here for CGMS procedure ordered by their provider and that they have a working glucose meter and supplies at home.    Patient will be provided with a Freestyle Tiara Sensor and a copy of the Continuous Glucose Monitoring Patient Log to fill out during the study.    A detailed explanation of Continuous Glucose Monitoring was provided. Patient informed that this is a blind procedure and that they will not actually see the blood sugar tracing in real time. Reviewed with patient the patient education handout called "Freestyle Tiara Pro Sensor User Instructions" to review self-care during the study to avoid sensor loosening or removal ie... Bathing, Swimming, dressing, and exercising.    Instructed patient to check blood sugar using home glucometer and to record the following on provided patient log sheets: Blood sugar taken at home, Meals and snacks, Activity, and Diabetes medications taken and dosage    Patient was brought to a private location. Arm for insertion was selected and prepared and allowed to dry. Glucose Sensor Serial Number [id number] inserted to back of patient's Left upper arm.    The following forms were given and reviewed in detail with patient and all questions answered.    · Continuous Glucose Monitoring Patient Log #[id number]    · Freestyle Manufacture Patient Handout "Your Freestyle Tiara Pro Sensor: What you need to know"    "

## 2020-10-06 ENCOUNTER — PATIENT OUTREACH (OUTPATIENT)
Dept: ADMINISTRATIVE | Facility: OTHER | Age: 70
End: 2020-10-06

## 2020-10-06 NOTE — PROGRESS NOTES
"CC: This 69 y.o. White male  is here for evaluation of  T2DM along with comorbidities indicated in the Visit Diagnosis section of this encounter.    HPI: Driss Hernandez Jr. was diagnosed with T2DM in ~ 2005.     Medical history: CAD s/p CABG     Initial visit 9/2020  New to Endocrine. Referred by Dr. Willoughby. Pt was under care of Dr. Agrawal and TARI Werner, NP, previously.   He stopped Ozempic a few months ago d/t cost when he fell into donut hole. Finds that his appetite and his BGs have worsened since then. Believes his biggest problem controlling diabetes comes down to diet. "i'm not going to stop eating." Thinks he knows what to do to improve diet but cannot maintain it long term and he gets discouraged when BGs are still high after eating better. C/o historically high fasting BGs, even when bedtime BGs are relatively controlled.   Pt recalls an episode a few days ago when he had severe lower abdominal pain and passed out a few seconds.   Plan Will hold off on visit to dietician since patient believes that he knows what to do but has trouble maintaining restrictions. Reminded him that portion control was key, not "stopping everything."   He will hold off on this time on starting personal  Continuous Glucose Monitoring (CGM) like the Harrison as well as Ozempic due to cost. He is willing however to undergo CGM study through the clinic primarily to rule out Orly Phenomenon.   Increase Lantus from 26 to 30 units once daily.   Increase Novolin R to 11 units before each meal with sliding scale.   Continue metformin.   Test glucose 4x/day. Follow up for CGM review.       Interval history  Pt returns for CGM study review. However, sensor did not work. Brings a detailed log. Patient would like to resume Ozempic to help with BGs and weight.   BGs have improve since last visit with better eating habits.       LAST DIABETES EDUCATION: never --     HOSPITALIZED FOR DIABETES  -  No.      PRESCRIBED DIABETES MEDICATIONS: " metformin ER 1000 mg bid, Lantus Solostar 30 units every morning ~ 7:30 am, Novolin R 11 units ac with ss: 150-200=+2, 201-250=+4; 251-300=+6; 301-350=+8, over 350=+10 units      Misses medication doses - No  But injects Novolin R in the morning without eating.     DM COMPLICATIONS: peripheral neuropathy and cardiovascular disease    SELF MONITORING BLOOD GLUCOSE: Checks blood glucose at home 3x a day.  3                  HYPOGLYCEMIC EPISODES: none      CURRENT DIET: has cut down on milk from 4 to about a cup of milk a day, and water. Eats 3 meals/day - doesn't like breakfast but eats it anyway - egg and 1 slice of toast.  And snacks. Likes sugar less candy.         CURRENT EXERCISE: goes to gym once weekly (prior to pandemic went 3x/week). Also cuts grass 3x/week.     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 No acute distress.   PSYCH: AAOx3, appropriate mood and affect, conversant, well-groomed. Judgement and insight good.   NEURO: Cranial nerves grossly intact. Speech clear, no tremor.   CHEST: Respirations even and unlabored.       Hemoglobin A1C   Date Value Ref Range Status   08/18/2020 8.2 (H) 4.0 - 5.6 % Final     Comment:     ADA Screening Guidelines:  5.7-6.4%  Consistent with prediabetes  >or=6.5%  Consistent with diabetes  High levels of fetal hemoglobin interfere with the HbA1C  assay. Heterozygous hemoglobin variants (HbS, HgC, etc)do  not significantly interfere with this assay.   However, presence of multiple variants may affect accuracy.     08/29/2019 7.7 % Final     Comment:     Ania Werner   05/15/2019 8.4 (H) 4.0  - 5.6 % Final     Comment:     ADA Screening Guidelines:  5.7-6.4%  Consistent with prediabetes  >or=6.5%  Consistent with diabetes  High levels of fetal hemoglobin interfere with the HbA1C  assay. Heterozygous hemoglobin variants (HbS, HgC, etc)do  not significantly interfere with this assay.   However, presence of multiple variants may affect accuracy.     02/22/2019 8.0 (H) 4.0 - 5.6 % Final     Comment:     ADA Screening Guidelines:  5.7-6.4%  Consistent with prediabetes  >or=6.5%  Consistent with diabetes  High levels of fetal hemoglobin interfere with the HbA1C  assay. Heterozygous hemoglobin variants (HbS, HgC, etc)do  not significantly interfere with this assay.   However, presence of multiple variants may affect accuracy.             Chemistry        Component Value Date/Time     08/18/2020 0928    K 5.1 08/18/2020 0928     08/18/2020 0928    CO2 23 08/18/2020 0928    BUN 27 (H) 08/18/2020 0928    CREATININE 1.2 08/18/2020 0928     (H) 08/18/2020 0928        Component Value Date/Time    CALCIUM 9.8 08/18/2020 0928    ALKPHOS 41 (L) 08/18/2020 0928    AST 18 08/18/2020 0928    ALT 18 08/18/2020 0928    BILITOT 1.1 (H) 08/18/2020 0928    ESTGFRAFRICA >60.0 08/18/2020 0928    EGFRNONAA >60.0 08/18/2020 0928          Lab Results   Component Value Date    LDLCALC 87.0 08/18/2020       Lab Results   Component Value Date    MICALBCREAT 4.9 02/22/2019           STANDARDS of CARE:        ASA:               Last eye exam:       ASSESSMENT and PLAN:    A1C GOAL: < 7.5 %       1. Diabetes mellitus with neurological manifestations, uncontrolled  Start Ozempic. Inject 0.25 mg once weekly for 4 weeks and then increase to 0.5 mg weekly.   After 4 weeks, then increase to 1 mg once weekly.     Continue Novolin R 11 units with sliding scale before most meals. Increase Novolin R to 15 units if eating larger meal. You will likely be able to reduce Novolin R dose to about 8 units before meals with Ozempic.    Change Lantus 30 units every evening instead of every morning. May help with higher fasting glucoses.     Test glucose 4x/day. Follow up in 3 months with labs prior.       Hemoglobin A1C    Basic Metabolic Panel   2. Obesity (BMI 30.0-34.9)  Increases insulin resistance.      3. Coronary artery disease involving native coronary artery of native heart without angina pectoris  Microalbumin/Creatinine Ratio, Urine   4. Hypovitaminosis D  Vitamin D       Orders Placed This Encounter   Procedures    Hemoglobin A1C     Standing Status:   Future     Standing Expiration Date:   12/6/2021    Basic Metabolic Panel     Standing Status:   Future     Standing Expiration Date:   12/6/2021    Microalbumin/Creatinine Ratio, Urine     Standing Status:   Future     Standing Expiration Date:   12/6/2021     Order Specific Question:   Specimen Source     Answer:   Urine    Vitamin D     Standing Status:   Future     Standing Expiration Date:   12/6/2021        Follow up in about 3 months (around 1/7/2021).

## 2020-10-07 ENCOUNTER — CLINICAL SUPPORT (OUTPATIENT)
Dept: ENDOCRINOLOGY | Facility: CLINIC | Age: 70
End: 2020-10-07
Payer: MEDICARE

## 2020-10-07 ENCOUNTER — OFFICE VISIT (OUTPATIENT)
Dept: ENDOCRINOLOGY | Facility: CLINIC | Age: 70
End: 2020-10-07
Payer: MEDICARE

## 2020-10-07 VITALS
WEIGHT: 203.38 LBS | BODY MASS INDEX: 31.86 KG/M2 | TEMPERATURE: 99 F | HEART RATE: 59 BPM | DIASTOLIC BLOOD PRESSURE: 82 MMHG | SYSTOLIC BLOOD PRESSURE: 159 MMHG

## 2020-10-07 DIAGNOSIS — I25.10 CORONARY ARTERY DISEASE INVOLVING NATIVE CORONARY ARTERY OF NATIVE HEART WITHOUT ANGINA PECTORIS: ICD-10-CM

## 2020-10-07 DIAGNOSIS — E66.9 OBESITY (BMI 30.0-34.9): ICD-10-CM

## 2020-10-07 DIAGNOSIS — Z09 FOLLOW UP: Primary | ICD-10-CM

## 2020-10-07 DIAGNOSIS — E55.9 HYPOVITAMINOSIS D: ICD-10-CM

## 2020-10-07 PROCEDURE — 3077F PR MOST RECENT SYSTOLIC BLOOD PRESSURE >= 140 MM HG: ICD-10-PCS | Mod: HCNC,CPTII,S$GLB, | Performed by: NURSE PRACTITIONER

## 2020-10-07 PROCEDURE — 99999 PR PBB SHADOW E&M-EST. PATIENT-LVL V: CPT | Mod: PBBFAC,HCNC,, | Performed by: NURSE PRACTITIONER

## 2020-10-07 PROCEDURE — 1159F MED LIST DOCD IN RCRD: CPT | Mod: HCNC,S$GLB,, | Performed by: NURSE PRACTITIONER

## 2020-10-07 PROCEDURE — 99999 PR PBB SHADOW E&M-EST. PATIENT-LVL V: ICD-10-PCS | Mod: PBBFAC,HCNC,, | Performed by: NURSE PRACTITIONER

## 2020-10-07 PROCEDURE — 1101F PT FALLS ASSESS-DOCD LE1/YR: CPT | Mod: HCNC,CPTII,S$GLB, | Performed by: NURSE PRACTITIONER

## 2020-10-07 PROCEDURE — 1126F PR PAIN SEVERITY QUANTIFIED, NO PAIN PRESENT: ICD-10-PCS | Mod: HCNC,S$GLB,, | Performed by: NURSE PRACTITIONER

## 2020-10-07 PROCEDURE — 1126F AMNT PAIN NOTED NONE PRSNT: CPT | Mod: HCNC,S$GLB,, | Performed by: NURSE PRACTITIONER

## 2020-10-07 PROCEDURE — 3008F PR BODY MASS INDEX (BMI) DOCUMENTED: ICD-10-PCS | Mod: HCNC,CPTII,S$GLB, | Performed by: NURSE PRACTITIONER

## 2020-10-07 PROCEDURE — 3077F SYST BP >= 140 MM HG: CPT | Mod: HCNC,CPTII,S$GLB, | Performed by: NURSE PRACTITIONER

## 2020-10-07 PROCEDURE — 3079F DIAST BP 80-89 MM HG: CPT | Mod: HCNC,CPTII,S$GLB, | Performed by: NURSE PRACTITIONER

## 2020-10-07 PROCEDURE — 99214 PR OFFICE/OUTPT VISIT, EST, LEVL IV, 30-39 MIN: ICD-10-PCS | Mod: HCNC,S$GLB,, | Performed by: NURSE PRACTITIONER

## 2020-10-07 PROCEDURE — 3052F PR MOST RECENT HEMOGLOBIN A1C LEVEL 8.0 - < 9.0%: ICD-10-PCS | Mod: HCNC,CPTII,S$GLB, | Performed by: NURSE PRACTITIONER

## 2020-10-07 PROCEDURE — 99999 PR PBB SHADOW E&M-EST. PATIENT-LVL I: ICD-10-PCS | Mod: PBBFAC,HCNC,,

## 2020-10-07 PROCEDURE — 99214 OFFICE O/P EST MOD 30 MIN: CPT | Mod: HCNC,S$GLB,, | Performed by: NURSE PRACTITIONER

## 2020-10-07 PROCEDURE — 3079F PR MOST RECENT DIASTOLIC BLOOD PRESSURE 80-89 MM HG: ICD-10-PCS | Mod: HCNC,CPTII,S$GLB, | Performed by: NURSE PRACTITIONER

## 2020-10-07 PROCEDURE — 1159F PR MEDICATION LIST DOCUMENTED IN MEDICAL RECORD: ICD-10-PCS | Mod: HCNC,S$GLB,, | Performed by: NURSE PRACTITIONER

## 2020-10-07 PROCEDURE — 3052F HG A1C>EQUAL 8.0%<EQUAL 9.0%: CPT | Mod: HCNC,CPTII,S$GLB, | Performed by: NURSE PRACTITIONER

## 2020-10-07 PROCEDURE — 1101F PR PT FALLS ASSESS DOC 0-1 FALLS W/OUT INJ PAST YR: ICD-10-PCS | Mod: HCNC,CPTII,S$GLB, | Performed by: NURSE PRACTITIONER

## 2020-10-07 PROCEDURE — 3008F BODY MASS INDEX DOCD: CPT | Mod: HCNC,CPTII,S$GLB, | Performed by: NURSE PRACTITIONER

## 2020-10-07 PROCEDURE — 99999 PR PBB SHADOW E&M-EST. PATIENT-LVL I: CPT | Mod: PBBFAC,HCNC,,

## 2020-10-07 RX ORDER — SEMAGLUTIDE 1.34 MG/ML
INJECTION, SOLUTION SUBCUTANEOUS
Qty: 2 SYRINGE | Refills: 2 | Status: SHIPPED | OUTPATIENT
Start: 2020-10-07 | End: 2021-01-06

## 2020-10-07 RX ORDER — SEMAGLUTIDE 1.34 MG/ML
0.5 INJECTION, SOLUTION SUBCUTANEOUS
Qty: 1 SYRINGE | Refills: 0 | Status: SHIPPED | OUTPATIENT
Start: 2020-10-07 | End: 2021-01-06

## 2020-10-07 NOTE — PROGRESS NOTES
Patient returned to clinic today to return Glucose Sensor and signed patient log form used in CMGS procedure.    The CGMS Sensor will be scanned and downloaded. All reports will be imported into the patient's electronic medical record.    Endocrine Nurse Practitioner will complete data interpretation and make recommendations to the ordering Provider for follow up with patient.     Pt did not tolerated CGMS on well as sensor did not work properly. MERLIN Wood notified.

## 2020-10-07 NOTE — PATIENT INSTRUCTIONS
Start Ozempic. Inject 0.25 mg once weekly for 4 weeks and then increase to 0.5 mg weekly.   After 4 weeks, then increase to 1 mg once weekly.     Continue Novolin R 11 units with sliding scale before most meals. Increase Novolin R to 15 units if eating larger meal. You will likely be able to reduce Novolin R dose to about 8 units before meals with Ozempic.   Change Lantus 30 units every evening instead of every morning. May help with higher fasting glucoses.     Test glucose 4x/day. Follow up in 3 months with labs prior.

## 2020-10-28 ENCOUNTER — OFFICE VISIT (OUTPATIENT)
Dept: CARDIOLOGY | Facility: CLINIC | Age: 70
End: 2020-10-28
Payer: MEDICARE

## 2020-10-28 VITALS
HEIGHT: 67 IN | OXYGEN SATURATION: 96 % | HEART RATE: 64 BPM | DIASTOLIC BLOOD PRESSURE: 98 MMHG | SYSTOLIC BLOOD PRESSURE: 162 MMHG | WEIGHT: 210.31 LBS | BODY MASS INDEX: 33.01 KG/M2

## 2020-10-28 DIAGNOSIS — I10 ESSENTIAL HYPERTENSION: ICD-10-CM

## 2020-10-28 DIAGNOSIS — E11.9 DM TYPE 2 WITHOUT RETINOPATHY: ICD-10-CM

## 2020-10-28 DIAGNOSIS — R07.89 CHEST PAIN, ATYPICAL: ICD-10-CM

## 2020-10-28 DIAGNOSIS — I70.0 ABDOMINAL AORTIC ATHEROSCLEROSIS: ICD-10-CM

## 2020-10-28 DIAGNOSIS — E78.00 PURE HYPERCHOLESTEROLEMIA: ICD-10-CM

## 2020-10-28 DIAGNOSIS — Z95.1 HX OF CABG: Primary | ICD-10-CM

## 2020-10-28 PROCEDURE — 1125F AMNT PAIN NOTED PAIN PRSNT: CPT | Mod: HCNC,S$GLB,, | Performed by: INTERNAL MEDICINE

## 2020-10-28 PROCEDURE — 3052F PR MOST RECENT HEMOGLOBIN A1C LEVEL 8.0 - < 9.0%: ICD-10-PCS | Mod: HCNC,CPTII,S$GLB, | Performed by: INTERNAL MEDICINE

## 2020-10-28 PROCEDURE — 3008F BODY MASS INDEX DOCD: CPT | Mod: HCNC,CPTII,S$GLB, | Performed by: INTERNAL MEDICINE

## 2020-10-28 PROCEDURE — 1159F MED LIST DOCD IN RCRD: CPT | Mod: HCNC,S$GLB,, | Performed by: INTERNAL MEDICINE

## 2020-10-28 PROCEDURE — 99999 PR PBB SHADOW E&M-EST. PATIENT-LVL V: ICD-10-PCS | Mod: PBBFAC,HCNC,, | Performed by: INTERNAL MEDICINE

## 2020-10-28 PROCEDURE — 99214 OFFICE O/P EST MOD 30 MIN: CPT | Mod: HCNC,S$GLB,, | Performed by: INTERNAL MEDICINE

## 2020-10-28 PROCEDURE — 99999 PR PBB SHADOW E&M-EST. PATIENT-LVL V: CPT | Mod: PBBFAC,HCNC,, | Performed by: INTERNAL MEDICINE

## 2020-10-28 PROCEDURE — 3008F PR BODY MASS INDEX (BMI) DOCUMENTED: ICD-10-PCS | Mod: HCNC,CPTII,S$GLB, | Performed by: INTERNAL MEDICINE

## 2020-10-28 PROCEDURE — 3080F DIAST BP >= 90 MM HG: CPT | Mod: HCNC,CPTII,S$GLB, | Performed by: INTERNAL MEDICINE

## 2020-10-28 PROCEDURE — 1101F PT FALLS ASSESS-DOCD LE1/YR: CPT | Mod: HCNC,CPTII,S$GLB, | Performed by: INTERNAL MEDICINE

## 2020-10-28 PROCEDURE — 99214 PR OFFICE/OUTPT VISIT, EST, LEVL IV, 30-39 MIN: ICD-10-PCS | Mod: HCNC,S$GLB,, | Performed by: INTERNAL MEDICINE

## 2020-10-28 PROCEDURE — 3052F HG A1C>EQUAL 8.0%<EQUAL 9.0%: CPT | Mod: HCNC,CPTII,S$GLB, | Performed by: INTERNAL MEDICINE

## 2020-10-28 PROCEDURE — 1125F PR PAIN SEVERITY QUANTIFIED, PAIN PRESENT: ICD-10-PCS | Mod: HCNC,S$GLB,, | Performed by: INTERNAL MEDICINE

## 2020-10-28 PROCEDURE — 1159F PR MEDICATION LIST DOCUMENTED IN MEDICAL RECORD: ICD-10-PCS | Mod: HCNC,S$GLB,, | Performed by: INTERNAL MEDICINE

## 2020-10-28 PROCEDURE — 1101F PR PT FALLS ASSESS DOC 0-1 FALLS W/OUT INJ PAST YR: ICD-10-PCS | Mod: HCNC,CPTII,S$GLB, | Performed by: INTERNAL MEDICINE

## 2020-10-28 PROCEDURE — 3077F PR MOST RECENT SYSTOLIC BLOOD PRESSURE >= 140 MM HG: ICD-10-PCS | Mod: HCNC,CPTII,S$GLB, | Performed by: INTERNAL MEDICINE

## 2020-10-28 PROCEDURE — 3077F SYST BP >= 140 MM HG: CPT | Mod: HCNC,CPTII,S$GLB, | Performed by: INTERNAL MEDICINE

## 2020-10-28 PROCEDURE — 3080F PR MOST RECENT DIASTOLIC BLOOD PRESSURE >= 90 MM HG: ICD-10-PCS | Mod: HCNC,CPTII,S$GLB, | Performed by: INTERNAL MEDICINE

## 2020-10-28 RX ORDER — SODIUM CHLORIDE 9 MG/ML
3 INJECTION, SOLUTION INTRAVENOUS CONTINUOUS
Status: CANCELLED | OUTPATIENT
Start: 2020-10-28 | End: 2020-10-28

## 2020-10-28 RX ORDER — CLOPIDOGREL BISULFATE 75 MG/1
75 TABLET ORAL DAILY
Qty: 38 TABLET | Refills: 11 | Status: SHIPPED | OUTPATIENT
Start: 2020-10-28 | End: 2022-01-21

## 2020-10-28 RX ORDER — DIPHENHYDRAMINE HCL 25 MG
50 CAPSULE ORAL ONCE
Status: CANCELLED | OUTPATIENT
Start: 2020-10-28 | End: 2020-10-28

## 2020-10-28 NOTE — H&P
[facility] - Cardiology  History & Physical    Subjective:      Chief Complaint/Reason for Admission: [facility] with graft restudy    Driss Hernandez Jr. is a 70 y.o. male.    CAD/CABG x 3 2016 WJ, HTN, DM, HLD     Previously followed by Dr Hernandez  Here follow-up of coronary artery disease.  He denies any worsening cardiopulmonary complaints.  He's not expressing chest pain, shortness of breath or palpitations.  His main limitation stools his back and he's been taking muscle relaxers to help him sleep at night.  He denies any PND, orthopnea or lower edema.  He's not expressing dizziness, presyncope or syncope.  Says he goes to the gym 4 times a week with his wife.  Otherwise been in his usual state of health.     Here for follow-up of coronary artery disease.  He is here for follow-up as well to obtain preop clearance for back procedure including nerve block and possible sympathectomy by pain management.  He denies any cardiopulmonary complaints.  He has experienced no chest pain, shortness of breath or palpitations.  He denies any PND, orthopnea or lower extremity edema. He has not experienced any dizziness, presyncope or syncope.      PET stress: 7-17  CONCLUSIONS: NORMAL MYOCARDIAL PERFUSION PET STRESS TEST  1. The perfusion scan is free of evidence for myocardial ischemia or injury.   2. Resting wall motion is physiologic. Stress wall motion is physiologic.   3. LV function is normal at rest and stress.  (normal is >= 51%)  4. The ventricular volumes are normal at rest and stress.   5. The extracardiac distribution of radioactivity is normal.   6. There was no previous study available to compare.      Stress test 3/18/20    Probably normal joseph myocardial perfusion study.    There is a mild intensity fixed defect in the mid anterior wall of the left ventricle, secondary to attenuation.    Gated perfusion images showed an ejection fraction of 64% post stress.    There is normal wall motion post stress.     Echo  3/18/20  · Normal left ventricular systolic function. The estimated ejection fraction is 60%.  · Concentric left ventricular remodeling.  · No wall motion abnormalities.  · Normal right ventricular systolic function.     Holter within normal limits     3/5/20 Recently having some sharp left sided CP - different from angina prior to CABG  EKG NSR 1st degree AVB - otherwise ok  Echo and lexiscan myoview for CP and known CAD     8/18/20 Saw Dr Willoughby today - c/o SOB - CXR clear. Labs pending  Reports a severe dry cough - denies fever. Some rib soreness from coughing  EKG NSR PVCs NSSTT changes  Suspect cough and SOB are from bronchitis and not CHF or myocardial ischemia. Recent echo and stress test ok  F/U BNP  If symptoms improve then OV 3 months. Consider LHC if symptoms persist without explanation     10/28/20 Continues to have episodes of CP and SOB    Past Medical History:   Diagnosis Date    Chronic midline low back pain without sciatica 10/2/2017    Coronary artery disease involving native coronary artery of native heart 3/5/2020    Diabetes mellitus with neurological manifestations, uncontrolled 1/24/2017    Diabetic polyneuropathy associated with type 2 diabetes mellitus 1/24/2017    Diabetic polyneuropathy associated with type 2 diabetes mellitus     Essential hypertension 1/24/2017    Gastroesophageal reflux disease 1/24/2017    Hyperlipidemia 1/24/2017    Insomnia 1/24/2017    Long-term insulin use 1/24/2017    Lumbar spondylosis 11/13/2017    Nuclear sclerosis of both eyes 8/24/2017    Obesity     Stage 3 chronic kidney disease 2/13/2019    Uncontrolled type 2 diabetes mellitus without complication, with long-term current use of insulin 1/24/2017     Past Surgical History:   Procedure Laterality Date    BACK SURGERY      2002    CATARACT EXTRACTION W/  INTRAOCULAR LENS IMPLANT Right 08/29/2017    Dr. Hong    COLONOSCOPY N/A 2/21/2017    Procedure: COLONOSCOPY;  Surgeon: Robb OSCAR  MD Ashlee;  Location: [facility] ENDO;  Service: Endoscopy;  Laterality: N/A;    CORONARY ARTERY BYPASS GRAFT      March 2016    EPIDURAL STEROID INJECTION Bilateral 11/14/2018    Procedure: Lumbar Medial Branch Blocks;  Surgeon: Eze Byrd Jr., MD;  Location: [facility] ENDO;  Service: Pain Management;  Laterality: Bilateral;  Bilateral Lumbar Medial Branch Blocks L2, L3, L4, L5    [id number]  [id number]    Arrive @ 1150; NO Sedation    EPIDURAL STEROID INJECTION Bilateral 11/28/2018    Procedure: Injection, Steroid, Epidural;  Surgeon: Eze Byrd Jr., MD;  Location: [facility] ENDO;  Service: Pain Management;  Laterality: Bilateral;  Bilateral Sacroiliac Joint Steroid Injections    [id number]    Arrive @ 0910    EPIDURAL STEROID INJECTION Bilateral 3/20/2019    Procedure: Injection, Steroid, Sacroiliiac Joint;  Surgeon: Eze Byrd Jr., MD;  Location: [facility] ENDO;  Service: Pain Management;  Laterality: Bilateral;  Bilateral Sacroiliac Joint Steroid Injections    [id number]    Arrive @ 1145; Trigger point injections also?    TONSILLECTOMY       Family History   Problem Relation Age of Onset    Depression Mother     Heart disease Mother     Stroke Father     No Known Problems Sister     Blindness Brother     Diabetes Sister     No Known Problems Sister     No Known Problems Maternal Aunt     No Known Problems Maternal Uncle     No Known Problems Paternal Aunt     No Known Problems Paternal Uncle     No Known Problems Maternal Grandmother     No Known Problems Maternal Grandfather     No Known Problems Paternal Grandmother     No Known Problems Paternal Grandfather     Amblyopia Neg Hx     Cancer Neg Hx     Cataracts Neg Hx     Glaucoma Neg Hx     Hypertension Neg Hx     Macular degeneration Neg Hx     Retinal detachment Neg Hx     Strabismus Neg Hx     Thyroid disease Neg Hx      Social History     Tobacco Use    Smoking status: Never Smoker    Smokeless tobacco: Never Used   Substance Use Topics     Alcohol use: No     Frequency: Never     Drinks per session: 1 or 2     Binge frequency: Never    Drug use: No       (Not in a hospital admission)    Review of patient's allergies indicates:   Allergen Reactions    Penicillins Other (See Comments)     Unknown reaction, Had a reaction as a child    Shrimp Itching     Hand itching        Review of Systems   All other systems reviewed and are negative.      Objective:      Vital Signs (Most Recent)  Pulse: 64 (10/28/20 0929)  BP: (!) 162/98 (10/28/20 0929)  SpO2: 96 % (10/28/20 0929)    Vital Signs Range (Last 24H):  [unfilled]    Physical Exam  Constitutional:       Appearance: He is well-developed.   HENT:      Head: Normocephalic and atraumatic.   Eyes:      Pupils: Pupils are equal, round, and reactive to light.   Neck:      Musculoskeletal: Normal range of motion and neck supple.   Cardiovascular:      Rate and Rhythm: Normal rate and regular rhythm.   Pulmonary:      Effort: Pulmonary effort is normal.      Breath sounds: Normal breath sounds.   Abdominal:      General: Bowel sounds are normal.      Palpations: Abdomen is soft.   Musculoskeletal: Normal range of motion.   Skin:     General: Skin is warm and dry.   Neurological:      Mental Status: He is alert and oriented to person, place, and time.         Data Review:    CBC:   Lab Results   Component Value Date    WBC 7.02 08/18/2020    RBC 4.64 08/18/2020    HGB 11.5 (L) 08/18/2020    HCT 39.7 (L) 08/18/2020     08/18/2020     BMP:   Lab Results   Component Value Date     (H) 08/18/2020     08/18/2020    K 5.1 08/18/2020     08/18/2020    CO2 23 08/18/2020    BUN 27 (H) 08/18/2020    CREATININE 1.2 08/18/2020    CALCIUM 9.8 08/18/2020      ECG: NSR NSSTT changes.     Assessment:      There are no hospital problems to display for this patient.    Recurrent CP on ASA/plavix and 2 antianginals    Plan:      C with graft restudy - hold metformin 1 day before and 2 days  after

## 2020-10-28 NOTE — PROGRESS NOTES
Subjective:    Patient ID:  Driss Hernandez Jr. is a 70 y.o. male who presents for follow-up of Coronary Artery Disease      HPI     CAD/CABG x 3 2016 WJ, HTN, DM, HLD     Previously followed by Dr Hernandez  Here follow-up of coronary artery disease.  He denies any worsening cardiopulmonary complaints.  He's not expressing chest pain, shortness of breath or palpitations.  His main limitation stools his back and he's been taking muscle relaxers to help him sleep at night.  He denies any PND, orthopnea or lower edema.  He's not expressing dizziness, presyncope or syncope.  Says he goes to the gym 4 times a week with his wife.  Otherwise been in his usual state of health.     Here for follow-up of coronary artery disease.  He is here for follow-up as well to obtain preop clearance for back procedure including nerve block and possible sympathectomy by pain management.  He denies any cardiopulmonary complaints.  He has experienced no chest pain, shortness of breath or palpitations.  He denies any PND, orthopnea or lower extremity edema. He has not experienced any dizziness, presyncope or syncope.      PET stress: 7-17  CONCLUSIONS: NORMAL MYOCARDIAL PERFUSION PET STRESS TEST  1. The perfusion scan is free of evidence for myocardial ischemia or injury.   2. Resting wall motion is physiologic. Stress wall motion is physiologic.   3. LV function is normal at rest and stress.  (normal is >= 51%)  4. The ventricular volumes are normal at rest and stress.   5. The extracardiac distribution of radioactivity is normal.   6. There was no previous study available to compare.      Stress test 3/18/20    Probably normal joseph myocardial perfusion study.    There is a mild intensity fixed defect in the mid anterior wall of the left ventricle, secondary to attenuation.    Gated perfusion images showed an ejection fraction of 64% post stress.    There is normal wall motion post stress.     Echo 3/18/20  · Normal left ventricular  systolic function. The estimated ejection fraction is 60%.  · Concentric left ventricular remodeling.  · No wall motion abnormalities.  · Normal right ventricular systolic function.     Holter within normal limits     3/5/20 Recently having some sharp left sided CP - different from angina prior to CABG  EKG NSR 1st degree AVB - otherwise ok  Echo and lexiscan myoview for CP and known CAD     8/18/20 Saw Dr Willoughby today - c/o SOB - CXR clear. Labs pending  Reports a severe dry cough - denies fever. Some rib soreness from coughing  EKG NSR PVCs NSSTT changes  Suspect cough and SOB are from bronchitis and not CHF or myocardial ischemia. Recent echo and stress test ok  F/U BNP  If symptoms improve then OV 3 months. Consider LHC if symptoms persist without explanation     10/28/20 Continues to have episodes of CP and SOB    Review of Systems   Constitution: Negative for decreased appetite.   HENT: Negative for ear discharge.    Eyes: Negative for blurred vision.   Endocrine: Negative for polyphagia.   Skin: Negative for nail changes.   Genitourinary: Negative for bladder incontinence.   Neurological: Negative for aphonia.   Psychiatric/Behavioral: Negative for hallucinations.   Allergic/Immunologic: Negative for hives.        Objective:    Physical Exam   Constitutional: He is oriented to person, place, and time. He appears well-developed and well-nourished. No distress.   HENT:   Head: Normocephalic and atraumatic.   Eyes: Pupils are equal, round, and reactive to light. Conjunctivae and EOM are normal.   Neck: Normal range of motion. Neck supple. No thyromegaly present.   Cardiovascular: Normal rate, regular rhythm and normal heart sounds.   No murmur heard.  Pulmonary/Chest: Effort normal and breath sounds normal. No respiratory distress. He has no wheezes. He has no rales. He exhibits no tenderness.   Abdominal: Soft. Bowel sounds are normal.   Musculoskeletal:         General: No edema.   Neurological: He is alert  and oriented to person, place, and time.   Skin: Skin is warm and dry.   Psychiatric: He has a normal mood and affect. His behavior is normal.         Assessment:       1. Hx of CABG    2. Abdominal aortic atherosclerosis    3. Essential hypertension    4. Pure hypercholesterolemia    5. DM type 2 without retinopathy    6. Chest pain, atypical         Plan:       Load plavix  Stop prilosec  C with graft restudy  Hold metformin 1 day before and 2 days after

## 2020-11-05 ENCOUNTER — HOSPITAL ENCOUNTER (OUTPATIENT)
Dept: PREADMISSION TESTING | Facility: HOSPITAL | Age: 70
Discharge: HOME | End: 2020-11-05
Attending: INTERNAL MEDICINE
Payer: MEDICARE

## 2020-11-05 ENCOUNTER — TELEPHONE (OUTPATIENT)
Dept: CARDIOLOGY | Facility: HOSPITAL | Age: 70
End: 2020-11-05

## 2020-11-05 VITALS
HEIGHT: 67 IN | WEIGHT: 198.88 LBS | BODY MASS INDEX: 31.21 KG/M2 | TEMPERATURE: 98 F | RESPIRATION RATE: 16 BRPM | OXYGEN SATURATION: 99 % | DIASTOLIC BLOOD PRESSURE: 81 MMHG | HEART RATE: 71 BPM | SYSTOLIC BLOOD PRESSURE: 163 MMHG

## 2020-11-05 DIAGNOSIS — E78.00 PURE HYPERCHOLESTEROLEMIA: ICD-10-CM

## 2020-11-05 DIAGNOSIS — E11.9 DM TYPE 2 WITHOUT RETINOPATHY: ICD-10-CM

## 2020-11-05 DIAGNOSIS — R07.89 CHEST PAIN, ATYPICAL: ICD-10-CM

## 2020-11-05 DIAGNOSIS — I10 ESSENTIAL HYPERTENSION: ICD-10-CM

## 2020-11-05 DIAGNOSIS — Z95.1 HX OF CABG: ICD-10-CM

## 2020-11-05 DIAGNOSIS — I70.0 ABDOMINAL AORTIC ATHEROSCLEROSIS: ICD-10-CM

## 2020-11-05 LAB
ANION GAP SERPL CALC-SCNC: 9 MMOL/L (ref 8–16)
APTT BLDCRRT: 26.8 SEC (ref 21–32)
BUN SERPL-MCNC: 26 MG/DL (ref 8–23)
CALCIUM SERPL-MCNC: 11 MG/DL (ref 8.7–10.5)
CHLORIDE SERPL-SCNC: 107 MMOL/L (ref 95–110)
CO2 SERPL-SCNC: 25 MMOL/L (ref 23–29)
CREAT SERPL-MCNC: 1.2 MG/DL (ref 0.5–1.4)
ERYTHROCYTE [DISTWIDTH] IN BLOOD BY AUTOMATED COUNT: 18.6 % (ref 11.5–14.5)
EST. GFR  (AFRICAN AMERICAN): >60 ML/MIN/1.73 M^2
EST. GFR  (NON AFRICAN AMERICAN): >60 ML/MIN/1.73 M^2
GLUCOSE SERPL-MCNC: 112 MG/DL (ref 70–110)
HCT VFR BLD AUTO: 39.2 % (ref 40–54)
HGB BLD-MCNC: 12.2 G/DL (ref 14–18)
INR PPP: 1 (ref 0.8–1.2)
MCH RBC QN AUTO: 24.4 PG (ref 27–31)
MCHC RBC AUTO-ENTMCNC: 31.1 G/DL (ref 32–36)
MCV RBC AUTO: 79 FL (ref 82–98)
PLATELET # BLD AUTO: 248 K/UL (ref 150–350)
PMV BLD AUTO: 9.2 FL (ref 9.2–12.9)
POTASSIUM SERPL-SCNC: 4.6 MMOL/L (ref 3.5–5.1)
PROTHROMBIN TIME: 10.7 SEC (ref 9–12.5)
RBC # BLD AUTO: 4.99 M/UL (ref 4.6–6.2)
SODIUM SERPL-SCNC: 141 MMOL/L (ref 136–145)
WBC # BLD AUTO: 7.99 K/UL (ref 3.9–12.7)

## 2020-11-05 PROCEDURE — 93010 EKG 12-LEAD: ICD-10-PCS | Mod: HCNC,,, | Performed by: INTERNAL MEDICINE

## 2020-11-05 PROCEDURE — 36415 COLL VENOUS BLD VENIPUNCTURE: CPT | Mod: HCNC

## 2020-11-05 PROCEDURE — 93010 ELECTROCARDIOGRAM REPORT: CPT | Mod: HCNC,,, | Performed by: INTERNAL MEDICINE

## 2020-11-05 PROCEDURE — 85027 COMPLETE CBC AUTOMATED: CPT | Mod: HCNC

## 2020-11-05 PROCEDURE — 93005 ELECTROCARDIOGRAM TRACING: CPT | Mod: HCNC

## 2020-11-05 PROCEDURE — 80048 BASIC METABOLIC PNL TOTAL CA: CPT | Mod: HCNC

## 2020-11-05 PROCEDURE — 85610 PROTHROMBIN TIME: CPT | Mod: HCNC

## 2020-11-05 PROCEDURE — 85730 THROMBOPLASTIN TIME PARTIAL: CPT | Mod: HCNC

## 2020-11-05 RX ORDER — PANTOPRAZOLE SODIUM 40 MG/1
40 TABLET, DELAYED RELEASE ORAL DAILY
Qty: 30 TABLET | Refills: 11 | Status: SHIPPED | OUTPATIENT
Start: 2020-11-05 | End: 2021-03-16 | Stop reason: SDUPTHER

## 2020-11-05 RX ORDER — PANTOPRAZOLE SODIUM 40 MG/1
40 TABLET, DELAYED RELEASE ORAL DAILY
Qty: 30 TABLET | Refills: 11 | Status: SHIPPED | OUTPATIENT
Start: 2020-11-05 | End: 2020-11-05 | Stop reason: SDUPTHER

## 2020-11-05 NOTE — DISCHARGE INSTRUCTIONS
"  Your surgery is scheduled for _Wednesday Nov. 11, 2020___________________.    Call [phone] between 2 p.m. and 5 p.m. on   _______________ to find out your arrival time for the day of your surgery.      Please report to SAME DAY SURGERY UNIT on the 2nd FLOOR at_6:00 a.m.  Use front door entrance. The doors open at 0530 am.      If you need WHEELCHAIR assistance please call  [phone] from your cell phone or "0"  from the  hospital courtesy phone located to the right after you enter the hospital lobby.      INSTRUCTIONS IMPORTANT!!!  ¨ Do not eat or drink after 12 midnight-including water. OK to brush teeth, no   gum, candy or mints!    ¨ Take only these medicines with a small swallow of water-morning of surgery.  Take only med's checked on MED LIST        _x___  Return to Hospital Lab on _Tuesday 11- at 8:10 am_forCovid test.    _x___  Prep instructions:    SHOWER     _x___  Please shower using Hibiclens soap the night before AND  the morning of  your surgery/procedure. Do not use Hibiclens on your face or genitals       x        If your surgery is around your belly button (Navel) be sure to wash inside your belly button also. Rinse hibiclens off completely.  _x___  No shaving of procedural area at least 4-5 days before surgery due to    increased risk of skin irritation and/or possible infection.  _x___  No powder, lotions or creams to your body.  _x___  You may wear only deodorant on the day of surgery.  _x___  Please remove all jewelry, including piercings and leave at home.  _x___  No money or valuables needed. Please leave at home.  You may bring your  cell phone.  _x___  If going home the same day, arrange for a ride home. You will not be able to   drive if Anesthesia was used.    _x___  Wear loose fitting clothing. Allow for dressings, bandages.  _x__  Stop Aspirin, Ibuprofen, Motrin and Aleve at least 3-5 days before  surgery, unless otherwise instructed by your doctor, or the nurse.              You " MAY use Tylenol/acetaminophen until day of surgery.  _x___  If you take diabetic medication, do not take am of surgery   _x___  Call MD for temperature above 101 degrees.        __x__ Stop taking any Fish Oil supplement or any Vitamins that contain Vitamin  E at least 5 days prior to surgery.          I have read or had read and explained to me, and understand the above information.  Additional comments or instructions:Please call   [phone] if you have any questions regarding the instructions above.

## 2020-11-05 NOTE — TELEPHONE ENCOUNTER
----- Message from Meera Vega MA sent at 11/5/2020 11:12 AM CST -----    ----- Message -----  From: Gardenia Allen  Sent: 11/5/2020  11:08 AM CST  To: [facility] Staff    Type: Patient Call Back    Who called: Pt wife     What is the request in detail: Pt states that Dr took her  off omeprazole (PRILOSEC) 20 MG capsule and would like to know what he can take for his heart burn. Pt wife would also like to know when will her  be put back on omeprazole (PRILOSEC) 20 MG capsule.     Can the clinic reply by [facility]? No     Would the patient rather a call back or a response via [facility]?  Call Back     Best call back number:  [phone]        Additional Information:      Thank You

## 2020-11-10 ENCOUNTER — HOSPITAL ENCOUNTER (OUTPATIENT)
Dept: PREADMISSION TESTING | Facility: HOSPITAL | Age: 70
Discharge: HOME | End: 2020-11-10
Attending: INTERNAL MEDICINE
Payer: MEDICARE

## 2020-11-10 DIAGNOSIS — Z01.818 PRE-OP TESTING: ICD-10-CM

## 2020-11-10 LAB — SARS-COV-2 RDRP RESP QL NAA+PROBE: NEGATIVE

## 2020-11-10 PROCEDURE — U0002 COVID-19 LAB TEST NON-CDC: HCPCS | Mod: HCNC

## 2020-11-11 ENCOUNTER — HOSPITAL ENCOUNTER (OUTPATIENT)
Facility: HOSPITAL | Age: 70
Discharge: HOME | End: 2020-11-11
Attending: INTERNAL MEDICINE | Admitting: INTERNAL MEDICINE
Payer: MEDICARE

## 2020-11-11 VITALS
BODY MASS INDEX: 31.15 KG/M2 | HEART RATE: 54 BPM | TEMPERATURE: 98 F | DIASTOLIC BLOOD PRESSURE: 70 MMHG | RESPIRATION RATE: 20 BRPM | SYSTOLIC BLOOD PRESSURE: 167 MMHG | OXYGEN SATURATION: 95 % | WEIGHT: 198.88 LBS

## 2020-11-11 DIAGNOSIS — E78.00 PURE HYPERCHOLESTEROLEMIA: ICD-10-CM

## 2020-11-11 DIAGNOSIS — Z79.4 DIABETES MELLITUS DUE TO UNDERLYING CONDITION WITH STAGE 3 CHRONIC KIDNEY DISEASE, WITH LONG-TERM CURRENT USE OF INSULIN, UNSPECIFIED WHETHER STAGE 3A OR 3B CKD: ICD-10-CM

## 2020-11-11 DIAGNOSIS — I10 ESSENTIAL HYPERTENSION: ICD-10-CM

## 2020-11-11 DIAGNOSIS — E11.9 DM TYPE 2 WITHOUT RETINOPATHY: ICD-10-CM

## 2020-11-11 DIAGNOSIS — Z01.818 PRE-OP TESTING: Primary | ICD-10-CM

## 2020-11-11 DIAGNOSIS — E08.22 DIABETES MELLITUS DUE TO UNDERLYING CONDITION WITH STAGE 3 CHRONIC KIDNEY DISEASE, WITH LONG-TERM CURRENT USE OF INSULIN, UNSPECIFIED WHETHER STAGE 3A OR 3B CKD: ICD-10-CM

## 2020-11-11 DIAGNOSIS — R07.89 CHEST PAIN, ATYPICAL: ICD-10-CM

## 2020-11-11 DIAGNOSIS — N18.30 DIABETES MELLITUS DUE TO UNDERLYING CONDITION WITH STAGE 3 CHRONIC KIDNEY DISEASE, WITH LONG-TERM CURRENT USE OF INSULIN, UNSPECIFIED WHETHER STAGE 3A OR 3B CKD: ICD-10-CM

## 2020-11-11 DIAGNOSIS — Z95.1 HX OF CABG: ICD-10-CM

## 2020-11-11 DIAGNOSIS — I70.0 ABDOMINAL AORTIC ATHEROSCLEROSIS: ICD-10-CM

## 2020-11-11 LAB — POCT GLUCOSE: 123 MG/DL (ref 70–110)

## 2020-11-11 PROCEDURE — 99153 MOD SED SAME PHYS/QHP EA: CPT | Mod: HCNC | Performed by: INTERNAL MEDICINE

## 2020-11-11 PROCEDURE — 99152 PR MOD CONSCIOUS SEDATION, SAME PHYS, 5+ YRS, FIRST 15 MIN: ICD-10-PCS | Mod: HCNC,,, | Performed by: INTERNAL MEDICINE

## 2020-11-11 PROCEDURE — 99152 MOD SED SAME PHYS/QHP 5/>YRS: CPT | Mod: HCNC,,, | Performed by: INTERNAL MEDICINE

## 2020-11-11 PROCEDURE — 93459 L HRT ART/GRFT ANGIO: CPT | Mod: HCNC | Performed by: INTERNAL MEDICINE

## 2020-11-11 PROCEDURE — 99152 MOD SED SAME PHYS/QHP 5/>YRS: CPT | Mod: HCNC | Performed by: INTERNAL MEDICINE

## 2020-11-11 PROCEDURE — 25000003 PHARM REV CODE 250: Mod: HCNC | Performed by: INTERNAL MEDICINE

## 2020-11-11 PROCEDURE — 27201423 OPTIME MED/SURG SUP & DEVICES STERILE SUPPLY: Mod: HCNC | Performed by: INTERNAL MEDICINE

## 2020-11-11 PROCEDURE — C1769 GUIDE WIRE: HCPCS | Mod: HCNC | Performed by: INTERNAL MEDICINE

## 2020-11-11 PROCEDURE — 25500020 PHARM REV CODE 255: Mod: HCNC | Performed by: INTERNAL MEDICINE

## 2020-11-11 PROCEDURE — 93459 L HRT ART/GRFT ANGIO: CPT | Mod: 26,HCNC,, | Performed by: INTERNAL MEDICINE

## 2020-11-11 PROCEDURE — C1894 INTRO/SHEATH, NON-LASER: HCPCS | Mod: HCNC | Performed by: INTERNAL MEDICINE

## 2020-11-11 PROCEDURE — 93459: ICD-10-PCS | Mod: 26,HCNC,, | Performed by: INTERNAL MEDICINE

## 2020-11-11 PROCEDURE — C1887 CATHETER, GUIDING: HCPCS | Mod: HCNC | Performed by: INTERNAL MEDICINE

## 2020-11-11 PROCEDURE — 63600175 PHARM REV CODE 636 W HCPCS: Mod: HCNC | Performed by: INTERNAL MEDICINE

## 2020-11-11 RX ORDER — SODIUM CHLORIDE 9 MG/ML
3 INJECTION, SOLUTION INTRAVENOUS CONTINUOUS
Status: ACTIVE | OUTPATIENT
Start: 2020-11-11 | End: 2020-11-11

## 2020-11-11 RX ORDER — LIDOCAINE HYDROCHLORIDE 10 MG/ML
INJECTION, SOLUTION EPIDURAL; INFILTRATION; INTRACAUDAL; PERINEURAL
Status: DISCONTINUED | OUTPATIENT
Start: 2020-11-11 | End: 2020-11-11 | Stop reason: HOSPADM

## 2020-11-11 RX ORDER — ACETAMINOPHEN 325 MG/1
650 TABLET ORAL EVERY 4 HOURS PRN
Status: DISCONTINUED | OUTPATIENT
Start: 2020-11-11 | End: 2020-11-11 | Stop reason: HOSPADM

## 2020-11-11 RX ORDER — NITROGLYCERIN 0.4 MG/1
0.4 TABLET SUBLINGUAL EVERY 5 MIN PRN
Status: DISCONTINUED | OUTPATIENT
Start: 2020-11-11 | End: 2020-11-11 | Stop reason: HOSPADM

## 2020-11-11 RX ORDER — DIPHENHYDRAMINE HCL 50 MG
50 CAPSULE ORAL ONCE
Status: COMPLETED | OUTPATIENT
Start: 2020-11-11 | End: 2020-11-11

## 2020-11-11 RX ORDER — ONDANSETRON 8 MG/1
8 TABLET, ORALLY DISINTEGRATING ORAL EVERY 8 HOURS PRN
Status: DISCONTINUED | OUTPATIENT
Start: 2020-11-11 | End: 2020-11-11 | Stop reason: HOSPADM

## 2020-11-11 RX ORDER — SODIUM CHLORIDE 9 MG/ML
INJECTION, SOLUTION INTRAVENOUS CONTINUOUS
Status: ACTIVE | OUTPATIENT
Start: 2020-11-11 | End: 2020-11-11

## 2020-11-11 RX ORDER — FENTANYL CITRATE 50 UG/ML
INJECTION, SOLUTION INTRAMUSCULAR; INTRAVENOUS
Status: DISCONTINUED | OUTPATIENT
Start: 2020-11-11 | End: 2020-11-11 | Stop reason: HOSPADM

## 2020-11-11 RX ORDER — MIDAZOLAM HYDROCHLORIDE 1 MG/ML
INJECTION, SOLUTION INTRAMUSCULAR; INTRAVENOUS
Status: DISCONTINUED | OUTPATIENT
Start: 2020-11-11 | End: 2020-11-11 | Stop reason: HOSPADM

## 2020-11-11 RX ORDER — HYDROCODONE BITARTRATE AND ACETAMINOPHEN 5; 325 MG/1; MG/1
1 TABLET ORAL EVERY 4 HOURS PRN
Status: DISCONTINUED | OUTPATIENT
Start: 2020-11-11 | End: 2020-11-11 | Stop reason: HOSPADM

## 2020-11-11 RX ORDER — BLOOD SUGAR DIAGNOSTIC
STRIP MISCELLANEOUS
Qty: 300 STRIP | Refills: 12 | Status: SHIPPED | OUTPATIENT
Start: 2020-11-11 | End: 2022-01-10

## 2020-11-11 RX ADMIN — ACETAMINOPHEN 650 MG: 325 TABLET ORAL at 01:11

## 2020-11-11 RX ADMIN — DIPHENHYDRAMINE HYDROCHLORIDE 50 MG: 50 CAPSULE ORAL at 07:11

## 2020-11-11 RX ADMIN — SODIUM CHLORIDE: 0.9 INJECTION, SOLUTION INTRAVENOUS at 08:11

## 2020-11-11 RX ADMIN — SODIUM CHLORIDE 3 ML/KG/HR: 0.9 INJECTION, SOLUTION INTRAVENOUS at 07:11

## 2020-11-11 NOTE — DISCHARGE INSTRUCTIONS
ANGIOGRAM INSTRUCTIONS                                           Drink plenty of fluids for the next 48 hours and follow your doctor's diet orders.    Rest for the next 72 hours.   Try not to keep the injected leg bent for a long period of time.  Remove the dressing in 24 hours, and you may shower. Clean the area with soap and water, and apply a band aid for the next 5 days.                                                                 No  Lifting over 5-10 lbs., that is, not more than 1 gallon of water, or straining for 72 hours.    No driving, no drinking alcohol, and no signing legal documents for the next 24 hours.    Call your doctor for elevated temperature, shortness of breath, chest pain, or cold discolored foot or leg.    If oozing occurs at the injections site, lie down.  Apply pressure with a clean wash cloth for 20 to 30 minutes and call your doctor.    If severe bleeding occurs, lie down, apply pressure.  Call 911 and request an ambulance to take you to the nearest hospital emergency room.    Continue to take your regular medications as instructed.        Discharge Instructions for Cardiac Catheterization  Cardiac catheterization is a procedure to look for blocked areas in the blood vessels that send blood to the heart. A thin, flexible tube (catheter) is put in a blood vessel in your groin or arm. The healthcare provider injects contrast fluid into your blood, which then flows to your heart. X-rays pictures are taken of your heart. Your provider will review the results with you. Be sure to ask any questions you have before you leave. This sheet will help you take care of yourself at home.  Home care  · Only do light and easy activities for the next 2 to 3 days. Ask for help with chores and errands while you recover. Have someone drive you to your appointments.  · Don't lift anything heavy for a while. Your healthcare team will tell you when it's safe to lift again.  · Ask your healthcare team  when you can expect to return to work. Unless your job involves lifting, you may be able to return to your normal activities within a couple of days.  · Take your medicines as directed. Don't skip doses.  · Drink 6 to 8 glasses of water a day. This is to help flush the contrast dye out of your body. Call your healthcare team if your urine has any change in color.  · Take your temperature each day for 7 days. If you feel cold and clammy or start sweating, take your temperature right away and call your healthcare team.  · Check your incisions every day for signs of infection. These include redness, swelling, and drainage. It is normal to have a small bruise or bump where the catheter was inserted. A bruise that is getting larger is not normal and should be reported to your healthcare team. If you see blood forming in the incision, call your healthcare team. Go to the emergency department if you have uncontrolled bleeding from the artery site. This is especially true if you take medicines that make it difficult for your blood to clot. Examples are aspirin, clopidogrel, and warfarin.  · Eat a healthy diet. Make sure it is low in fat, salt, and cholesterol. Ask your healthcare team for diet information.  · Stop smoking. Enroll in a stop-smoking program or ask your healthcare team for help. Stop-smoking programs can be life saving.  · Exercise as your healthcare team tells you to. Your healthcare team may recommend you start a cardiac rehabilitation program. Cardiac rehab is an exercise program in which trained healthcare staff watch your progress and stress on your heart while you exercise. Ask your team how to enroll.  · Don't swim or take baths until your healthcare team says its OK. You can shower the day after the procedure. Keep the site clean and dry. This keeps the incision from getting wet and infected until the skin and artery can heal.  Follow-up care  · Make a follow-up appointment as advised by our staff.  It's common to have a follow-up appointment 2 to 4 weeks after an angioplasty or coronary stent procedure.  · Make a yearly appointment, too. This is to make sure you are still doing well and not having any new symptoms.  · Don't wait for a follow-up appointment if your medicines aren't working or you are having heart-related symptoms.  When to seek medical care  Call your healthcare provider right away if you have any of the following:  · Chest pain  · Constant or increasing pain or numbness in your leg  · Fever of 100.4°F (38.0°C) or higher, or as directed by your healthcare provider  · Symptoms of infection. These include redness, swelling, drainage, or warmth at the incision site.  · Shortness of breath  · A leg that feels cold or appears blue  · Bleeding, bruising, or a lot of swelling where the catheter was inserted  · Blood in your urine  · Black or tarry stools  · Any unusual bleeding   Date Last Reviewed: 10/1/2016  © 8048-2924 [employer]. [address] [place] [address]. All rights reserved. This information is not intended as a substitute for professional medical care. Always follow your healthcare professional's instructions.        Bleeding or Hematoma After Cardiac Catheterization  You recently had cardiac catheterization. A catheter was put into your body through a puncture site in your groin or arm. You now have bleeding from this site. When bleeding occurs, it may drip or spurt from the site. Or it may collect in a lump (hematoma) under the skin. In the emergency department, pressure will be put on the site to stop bleeding. You will be sent home when the bleeding stops. To prevent repeat bleeding, take some precautions at home. If the bleeding starts again, follow the advice below.    Home care  For the next 48 hours:  · Don't do any strenuous activity.  · Don't climb stairs.  · Don't lift anything heavy.  · If the puncture site is in your arm or wrist, don't lie on that  "arm.  · If your puncture site is in the groin, don't strain at bowel movements.  · Don't scrub the site when bathing. It is fine to get it wet after your healthcare provider says it is OK, but don't scrub it or massage it.  If bleeding happens again, call 911. Take the following steps to stop the bleeding until help arrives:  · Lie on your back.  · Place a clean cloth or gauze pad over the puncture site. Then hold firm pressure right on the site. Or have someone else apply firm pressure using the gauze pad or washcloth.  · Keep your arm straight and raised above the level of your heart if the site is in your arm or wrist. If the site is in your groin, have someone else hold pressure on the site. If you dont have someone to do this, put a 5-pound bag of rice or flour on the site and apply pressure with your hand over the bag.  · Don't press too hard! If you press too hard, your leg and foot (or arm and hand) will not get blood flow and the skin under your toenails or fingernails may turn white. The skin should look pink, like the toenails on your other foot. When you (or someone) presses on the toenail it will turn white, but when you stop pressing on the nail it will turn pink again. This is called "capillary refill." If there is someone with you, he or she can check it.  · If your toes, foot, or leg start feeling numb, tingly, or cold, ease up on the pressure, because you are probably pressing too hard.  · As soon as emergency care arrives, they will take over your care.  Follow-up care  Follow up with your healthcare provider, or as advised. Ask your healthcare provider for a contact number to call.  Call 911  Call 911 if any of these occur:  · Chest pain or pressure  · Any bleeding from the site  · Feeling weak or faint  · Trouble breathing  · Lump (hematoma) is quickly getting larger  · Coolness, numbness, tingling, or skin color changes in the leg or arm with the puncture site  When to seek medical " advice  Call your healthcare provider right away if any of these occur:  · Increased pain, redness, swelling, or drainage from the puncture site  · Nausea or vomiting  Date Last Reviewed: 1/11/2016 © 2000-2017 [employer]. [address] [place] [address], [place] [address]. All rights reserved. This information is not intended as a substitute for professional medical care. Always follow your healthcare professional's instructions.      Fall Prevention  Millions of people fall every year and injure themselves. You may have had anesthesia or sedation which may increase your risk of falling. You may have health issues that put you at an increased risk of falling.     Here are ways to reduce your risk of falling.  ·   · Make your home safe by keeping walkways clear of objects you may trip over.  · Use non-slip pads under rugs. Do not use area rugs or small throw rugs.  · Use non-slip mats in bathtubs and showers.  · Install handrails and lights on staircases.  · Do not walk in poorly lit areas.  · Do not stand on chairs or wobbly ladders.  · Use caution when reaching overhead or looking upward. This position can cause a loss of balance.  · Be sure your shoes fit properly, have non-slip bottoms and are in good condition.   · Wear shoes both inside and out. Avoid going barefoot or wearing slippers.  · Be cautious when going up and down stairs, curbs, and when walking on uneven sidewalks.  · If your balance is poor, consider using a cane or walker.  · If your fall was related to alcohol use, stop or limit alcohol intake.   · If your fall was related to use of sleeping medicines, talk to your doctor about this. You may need to reduce your dosage at bedtime if you awaken during the night to go to the bathroom.    · To reduce the need for nighttime bathroom trips:  ¨ Avoid drinking fluids for several hours before going to bed  ¨ Empty your bladder before going to bed  ¨ Men can keep a urinal at the bedside  · Stay as  active as you can. Balance, flexibility, strength, and endurance all come from exercise. They all play a role in preventing falls. Ask your healthcare provider which types of activity are right for you.  · Get your vision checked on a regular basis.  · If you have pets, know where they are before you stand up or walk so you don't trip over them.  · Use night lights.

## 2020-11-11 NOTE — HPI
CAD/CABG x 3 2016 WJ, HTN, DM, HLD     Previously followed by Dr Hernandez  Here follow-up of coronary artery disease.  He denies any worsening cardiopulmonary complaints.  He's not expressing chest pain, shortness of breath or palpitations.  His main limitation stools his back and he's been taking muscle relaxers to help him sleep at night.  He denies any PND, orthopnea or lower edema.  He's not expressing dizziness, presyncope or syncope.  Says he goes to the gym 4 times a week with his wife.  Otherwise been in his usual state of health.     Here for follow-up of coronary artery disease.  He is here for follow-up as well to obtain preop clearance for back procedure including nerve block and possible sympathectomy by pain management.  He denies any cardiopulmonary complaints.  He has experienced no chest pain, shortness of breath or palpitations.  He denies any PND, orthopnea or lower extremity edema. He has not experienced any dizziness, presyncope or syncope.      PET stress: 7-17  CONCLUSIONS: NORMAL MYOCARDIAL PERFUSION PET STRESS TEST  1. The perfusion scan is free of evidence for myocardial ischemia or injury.   2. Resting wall motion is physiologic. Stress wall motion is physiologic.   3. LV function is normal at rest and stress.  (normal is >= 51%)  4. The ventricular volumes are normal at rest and stress.   5. The extracardiac distribution of radioactivity is normal.   6. There was no previous study available to compare.      Stress test 3/18/20    Probably normal joseph myocardial perfusion study.    There is a mild intensity fixed defect in the mid anterior wall of the left ventricle, secondary to attenuation.    Gated perfusion images showed an ejection fraction of 64% post stress.    There is normal wall motion post stress.     Echo 3/18/20  · Normal left ventricular systolic function. The estimated ejection fraction is 60%.  · Concentric left ventricular remodeling.  · No wall motion  abnormalities.  · Normal right ventricular systolic function.     Holter within normal limits     3/5/20 Recently having some sharp left sided CP - different from angina prior to CABG  EKG NSR 1st degree AVB - otherwise ok  Echo and lexiscan myoview for CP and known CAD     8/18/20 Saw Dr Willoughby today - c/o SOB - CXR clear. Labs pending  Reports a severe dry cough - denies fever. Some rib soreness from coughing  EKG NSR PVCs NSSTT changes  Suspect cough and SOB are from bronchitis and not CHF or myocardial ischemia. Recent echo and stress test ok  F/U BNP  If symptoms improve then OV 3 months. Consider LHC if symptoms persist without explanation     10/28/20 Continues to have episodes of CP and SOB

## 2020-11-11 NOTE — Clinical Note
100 ml injected throughout the case. 22 mL total wasted during the case. 122 mL total used in the case.

## 2020-11-11 NOTE — H&P
[facility] - Cardiology  History & Physical    Subjective:      Chief Complaint/Reason for Admission: [facility] with graft restudy    Driss Hernandez Jr. is a 70 y.o. male.    CAD/CABG x 3 2016 WJ, HTN, DM, HLD     Previously followed by Dr Hernandez  Here follow-up of coronary artery disease.  He denies any worsening cardiopulmonary complaints.  He's not expressing chest pain, shortness of breath or palpitations.  His main limitation stools his back and he's been taking muscle relaxers to help him sleep at night.  He denies any PND, orthopnea or lower edema.  He's not expressing dizziness, presyncope or syncope.  Says he goes to the gym 4 times a week with his wife.  Otherwise been in his usual state of health.     Here for follow-up of coronary artery disease.  He is here for follow-up as well to obtain preop clearance for back procedure including nerve block and possible sympathectomy by pain management.  He denies any cardiopulmonary complaints.  He has experienced no chest pain, shortness of breath or palpitations.  He denies any PND, orthopnea or lower extremity edema. He has not experienced any dizziness, presyncope or syncope.      PET stress: 7-17  CONCLUSIONS: NORMAL MYOCARDIAL PERFUSION PET STRESS TEST  1. The perfusion scan is free of evidence for myocardial ischemia or injury.   2. Resting wall motion is physiologic. Stress wall motion is physiologic.   3. LV function is normal at rest and stress.  (normal is >= 51%)  4. The ventricular volumes are normal at rest and stress.   5. The extracardiac distribution of radioactivity is normal.   6. There was no previous study available to compare.      Stress test 3/18/20    Probably normal joseph myocardial perfusion study.    There is a mild intensity fixed defect in the mid anterior wall of the left ventricle, secondary to attenuation.    Gated perfusion images showed an ejection fraction of 64% post stress.    There is normal wall motion post stress.     Echo  3/18/20  · Normal left ventricular systolic function. The estimated ejection fraction is 60%.  · Concentric left ventricular remodeling.  · No wall motion abnormalities.  · Normal right ventricular systolic function.     Holter within normal limits     3/5/20 Recently having some sharp left sided CP - different from angina prior to CABG  EKG NSR 1st degree AVB - otherwise ok  Echo and lexiscan myoview for CP and known CAD     8/18/20 Saw Dr Willoughby today - c/o SOB - CXR clear. Labs pending  Reports a severe dry cough - denies fever. Some rib soreness from coughing  EKG NSR PVCs NSSTT changes  Suspect cough and SOB are from bronchitis and not CHF or myocardial ischemia. Recent echo and stress test ok  F/U BNP  If symptoms improve then OV 3 months. Consider LHC if symptoms persist without explanation     10/28/20 Continues to have episodes of CP and SOB    Past Medical History:   Diagnosis Date    Chronic midline low back pain without sciatica 10/2/2017    Coronary artery disease involving native coronary artery of native heart 3/5/2020    Diabetes mellitus with neurological manifestations, uncontrolled 1/24/2017    Diabetic polyneuropathy associated with type 2 diabetes mellitus 1/24/2017    Diabetic polyneuropathy associated with type 2 diabetes mellitus     Essential hypertension 1/24/2017    Gastroesophageal reflux disease 1/24/2017    Hyperlipidemia 1/24/2017    Insomnia 1/24/2017    Long-term insulin use 1/24/2017    Lumbar spondylosis 11/13/2017    Nuclear sclerosis of both eyes 8/24/2017    Obesity     Stage 3 chronic kidney disease 2/13/2019    Uncontrolled type 2 diabetes mellitus without complication, with long-term current use of insulin 1/24/2017     Past Surgical History:   Procedure Laterality Date    BACK SURGERY      2002    CATARACT EXTRACTION W/  INTRAOCULAR LENS IMPLANT Right 08/29/2017    Dr. Hong    COLONOSCOPY N/A 2/21/2017    Procedure: COLONOSCOPY;  Surgeon: Robb OSCAR  MD Ashlee;  Location: [facility] ENDO;  Service: Endoscopy;  Laterality: N/A;    CORONARY ARTERY BYPASS GRAFT      March 2016    EPIDURAL STEROID INJECTION Bilateral 11/14/2018    Procedure: Lumbar Medial Branch Blocks;  Surgeon: Eze Byrd Jr., MD;  Location: [facility] ENDO;  Service: Pain Management;  Laterality: Bilateral;  Bilateral Lumbar Medial Branch Blocks L2, L3, L4, L5    [id number]  [id number]    Arrive @ 1150; NO Sedation    EPIDURAL STEROID INJECTION Bilateral 11/28/2018    Procedure: Injection, Steroid, Epidural;  Surgeon: Eze Byrd Jr., MD;  Location: [facility] ENDO;  Service: Pain Management;  Laterality: Bilateral;  Bilateral Sacroiliac Joint Steroid Injections    [id number]    Arrive @ 0910    EPIDURAL STEROID INJECTION Bilateral 3/20/2019    Procedure: Injection, Steroid, Sacroiliiac Joint;  Surgeon: Eze Byrd Jr., MD;  Location: [facility] ENDO;  Service: Pain Management;  Laterality: Bilateral;  Bilateral Sacroiliac Joint Steroid Injections    [id number]    Arrive @ 1145; Trigger point injections also?    TONSILLECTOMY       Family History   Problem Relation Age of Onset    Depression Mother     Heart disease Mother     Stroke Father     No Known Problems Sister     Blindness Brother     Diabetes Sister     No Known Problems Sister     No Known Problems Maternal Aunt     No Known Problems Maternal Uncle     No Known Problems Paternal Aunt     No Known Problems Paternal Uncle     No Known Problems Maternal Grandmother     No Known Problems Maternal Grandfather     No Known Problems Paternal Grandmother     No Known Problems Paternal Grandfather     Amblyopia Neg Hx     Cancer Neg Hx     Cataracts Neg Hx     Glaucoma Neg Hx     Hypertension Neg Hx     Macular degeneration Neg Hx     Retinal detachment Neg Hx     Strabismus Neg Hx     Thyroid disease Neg Hx      Social History     Tobacco Use    Smoking status: Never Smoker    Smokeless tobacco: Never Used   Substance Use Topics  "   Alcohol use: No     Frequency: Never     Drinks per session: 1 or 2     Binge frequency: Never    Drug use: No       PTA Medications   Medication Sig    ACCU-CHEK MOIZ CONTROL SOLN Soln     ACCU-CHEK MOIZ PLUS TEST STRP Strp TEST THREE TIMES DAILY    ACCU-CHEK SOFTCLIX LANCETS Misc     amLODIPine (NORVASC) 10 MG tablet TAKE 1 TABLET(10 MG) BY MOUTH EVERY DAY    aspirin 325 MG tablet Take 325 mg by mouth every morning.     atorvastatin (LIPITOR) 40 MG tablet TAKE 1 TABLET (40 MG TOTAL) BY MOUTH EVERY MORNING.    BD ALCOHOL SWABS PadM     BD ULTRA-FINE ROLAND PEN NEEDLES 32 gauge x 5/32" Ndle     clopidogreL (PLAVIX) 75 mg tablet Take 1 tablet (75 mg total) by mouth once daily. 8 pills the first day    ergocalciferol (ERGOCALCIFEROL) 50,000 unit Cap TAKE ONE CAPSULE BY MOUTH WEEKLY    fenofibrate 160 MG Tab Take 1 tablet (160 mg total) by mouth every morning.    gabapentin (NEURONTIN) 100 MG capsule TAKE 2 CAPSULES IN THE MORNING AND 3 CAPSULES WITH DINNER    insulin regular (NOVOLIN R REGULAR U-100 INSULN) 100 unit/mL Inj injection 11 Units 3 (three) times daily before meals.    LANTUS SOLOSTAR U-100 INSULIN glargine 100 units/mL (3mL) SubQ pen Inject 30 Units into the skin every morning.    magnesium 250 mg Tab     metFORMIN (GLUCOPHAGE-XR) 500 MG ER 24hr tablet TAKE 2 TABLETS TWICE DAILY WITH MEALS.     metoprolol succinate (TOPROL-XL) 50 MG 24 hr tablet TAKE 1 TABLET DAILY WITH DINNER OR EVENING MEAL.    nitroGLYCERIN (NITROSTAT) 0.4 MG SL tablet Place 1 tablet (0.4 mg total) under the tongue every 5 (five) minutes as needed for Chest pain.    omega-3 acid ethyl esters (LOVAZA) 1 gram capsule Take 2 capsules by mouth 2 (two) times daily.    pantoprazole (PROTONIX) 40 MG tablet Take 1 tablet (40 mg total) by mouth once daily.    semaglutide (OZEMPIC) 0.25 mg or 0.5 mg(2 mg/1.5 mL) PnIj Inject 0.5 mg into the skin every 7 days.    semaglutide (OZEMPIC) 1 mg/dose (2 mg/1.5 mL) PnIj Inject " increase to 0.5 mg weekly. After 4 weeks, increase to 1 mg weekly.    tiZANidine (ZANAFLEX) 4 MG tablet Take 4 mg by mouth every 6 (six) hours as needed.    triazolam (HALCION) 0.25 MG Tab TK 1 T PO QHS, prn     Review of patient's allergies indicates:   Allergen Reactions    Penicillins Other (See Comments)     Unknown reaction, Had a reaction as a child    Shrimp Itching     Hand itching        Review of Systems   All other systems reviewed and are negative.      Objective:      Vital Signs (Most Recent)       Vital Signs Range (Last 24H):  [unfilled]    Physical Exam  Constitutional:       Appearance: He is well-developed.   HENT:      Head: Normocephalic and atraumatic.   Eyes:      Pupils: Pupils are equal, round, and reactive to light.   Neck:      Musculoskeletal: Normal range of motion and neck supple.   Cardiovascular:      Rate and Rhythm: Normal rate and regular rhythm.   Pulmonary:      Effort: Pulmonary effort is normal.      Breath sounds: Normal breath sounds.   Abdominal:      General: Bowel sounds are normal.      Palpations: Abdomen is soft.   Musculoskeletal: Normal range of motion.   Skin:     General: Skin is warm and dry.   Neurological:      Mental Status: He is alert and oriented to person, place, and time.         Data Review:    CBC:   Lab Results   Component Value Date    WBC 7.99 11/05/2020    RBC 4.99 11/05/2020    HGB 12.2 (L) 11/05/2020    HCT 39.2 (L) 11/05/2020     11/05/2020     BMP:   Lab Results   Component Value Date     (H) 11/05/2020     11/05/2020    K 4.6 11/05/2020     11/05/2020    CO2 25 11/05/2020    BUN 26 (H) 11/05/2020    CREATININE 1.2 11/05/2020    CALCIUM 11.0 (H) 11/05/2020      ECG: NSR NSSTT changes.     Assessment:      There are no hospital problems to display for this patient.    Recurrent CP on ASA/plavix and 2 antianginals    Plan:      C with graft restudy - hold metformin 1 day before and 2 days after

## 2020-11-11 NOTE — PLAN OF CARE
Pt verbalized readiness to go home and understanding of discharge instructions. Pt and spouse verbalized an understanding to hold Metformin for 2 days resuming on 11/13/2020.

## 2020-11-11 NOTE — Clinical Note
The catheter is inserted and hemodynamics were recorded ostium   left main. Angiography performed of the left coronary arteries in multiple views.

## 2020-11-11 NOTE — DISCHARGE SUMMARY
[facility]  Cardiology  Discharge Summary      Patient Name: Driss Hernandez Jr.  MRN: [id number]  Admission Date: 11/11/2020  Hospital Length of Stay: 0 days  Discharge Date and Time:  11/11/2020 8:25 AM  Attending Physician: Lane Cuellar MD    Discharging Provider: Lane Cuellar MD  Primary Care Physician: Jono Willoughby MD    HPI:      CAD/CABG x 3 2016 WJ, HTN, DM, HLD     Previously followed by Dr Hernandez  Here follow-up of coronary artery disease.  He denies any worsening cardiopulmonary complaints.  He's not expressing chest pain, shortness of breath or palpitations.  His main limitation stools his back and he's been taking muscle relaxers to help him sleep at night.  He denies any PND, orthopnea or lower edema.  He's not expressing dizziness, presyncope or syncope.  Says he goes to the gym 4 times a week with his wife.  Otherwise been in his usual state of health.     Here for follow-up of coronary artery disease.  He is here for follow-up as well to obtain preop clearance for back procedure including nerve block and possible sympathectomy by pain management.  He denies any cardiopulmonary complaints.  He has experienced no chest pain, shortness of breath or palpitations.  He denies any PND, orthopnea or lower extremity edema. He has not experienced any dizziness, presyncope or syncope.      PET stress: 7-17  CONCLUSIONS: NORMAL MYOCARDIAL PERFUSION PET STRESS TEST  1. The perfusion scan is free of evidence for myocardial ischemia or injury.   2. Resting wall motion is physiologic. Stress wall motion is physiologic.   3. LV function is normal at rest and stress.  (normal is >= 51%)  4. The ventricular volumes are normal at rest and stress.   5. The extracardiac distribution of radioactivity is normal.   6. There was no previous study available to compare.      Stress test 3/18/20    Probably normal joseph myocardial perfusion study.    There is a mild intensity fixed defect  in the mid anterior wall of the left ventricle, secondary to attenuation.    Gated perfusion images showed an ejection fraction of 64% post stress.    There is normal wall motion post stress.     Echo 3/18/20  · Normal left ventricular systolic function. The estimated ejection fraction is 60%.  · Concentric left ventricular remodeling.  · No wall motion abnormalities.  · Normal right ventricular systolic function.     Holter within normal limits     3/5/20 Recently having some sharp left sided CP - different from angina prior to CABG  EKG NSR 1st degree AVB - otherwise ok  Echo and lexiscan myoview for CP and known CAD     8/18/20 Saw Dr Willoughby today - c/o SOB - CXR clear. Labs pending  Reports a severe dry cough - denies fever. Some rib soreness from coughing  EKG NSR PVCs NSSTT changes  Suspect cough and SOB are from bronchitis and not CHF or myocardial ischemia. Recent echo and stress test ok  F/U BNP  If symptoms improve then OV 3 months. Consider LHC if symptoms persist without explanation     10/28/20 Continues to have episodes of CP and SOB    Procedure(s) (LRB):  ANGIOGRAM, CORONARY, INCLUDING BYPASS GRAFT, WITH LEFT HEART CATHETERIZATION (N/A)     Indwelling Lines/Drains at time of discharge:  Lines/Drains/Airways     None                 Hospital Course:  11/11/20 [facility] - EDP 19, LAD subtotal mid - distal vessel diffusely diseased, Cx - mid long 80% between OM1 and OM2, OM1 90% mid, OM2 80% mid, RCA 80% mid. SVG to D1 patent with good runoff, SVG to PDA patent with good runoff. LIMA to LAD - mid to distal graft with diffuse 80-90% - Distal LAD diffusely diseased     Will review with interventional staff for possible staged PCI of Cx/OM1/OM2  Home today  Continue current Rx    Consults:     Significant Diagnostic Studies: Angiography:     Pending Diagnostic Studies:     Procedure Component Value Units Date/Time    Troponin I in 6 hours [[id number]]     Order Status: Sent Lab Status: No result      "Specimen: Blood           Final Active Diagnoses:    Diagnosis Date Noted POA    Pre-op testing [Z01.818] 11/11/2020 Not Applicable    Hx of CABG [Z95.1] 03/05/2020 Not Applicable      Problems Resolved During this Admission:     Hx of CABG  11/11/20 C - EDP 19, LAD subtotal mid - distal vessel diffusely diseased, Cx - mid long 80% between OM1 and OM2, OM1 90% mid, OM2 80% mid, RCA 80% mid. SVG to D1 patent with good runoff, SVG to PDA patent with good runoff. LIMA to LAD - mid to distal graft with diffuse 80-90% - Distal LAD diffusely diseased     Will review with interventional staff for possible staged PCI of Cx/OM1/OM2  Home today  Continue current Rx        Discharged Condition: good    Disposition: Home or Self Care    Follow Up:    Patient Instructions:   No discharge procedures on file.  Medications:  Reconciled Home Medications:      Medication List      ASK your doctor about these medications    ACCU-CHEK MOIZ CONTROL SOLN Soln  Generic drug: blood glucose control high,low     ACCU-CHEK MOIZ PLUS TEST STRP Strp  Generic drug: blood sugar diagnostic  TEST THREE TIMES DAILY     ACCU-CHEK SOFTCLIX LANCETS Misc  Generic drug: lancets     amLODIPine 10 MG tablet  Commonly known as: NORVASC  TAKE 1 TABLET(10 MG) BY MOUTH EVERY DAY     aspirin 325 MG tablet  Take 325 mg by mouth every morning.     atorvastatin 40 MG tablet  Commonly known as: LIPITOR  TAKE 1 TABLET (40 MG TOTAL) BY MOUTH EVERY MORNING.     BD ALCOHOL SWABS Padm  Generic drug: alcohol swabs     BD ULTRA-FINE ROLAND PEN NEEDLE 32 gauge x 5/32" Ndle  Generic drug: pen needle, diabetic     clopidogreL 75 mg tablet  Commonly known as: PLAVIX  Take 1 tablet (75 mg total) by mouth once daily. 8 pills the first day     ergocalciferol 50,000 unit Cap  Commonly known as: ERGOCALCIFEROL  TAKE ONE CAPSULE BY MOUTH WEEKLY     fenofibrate 160 MG Tab  Take 1 tablet (160 mg total) by mouth every morning.     gabapentin 100 MG capsule  Commonly known as: " NEURONTIN  TAKE 2 CAPSULES IN THE MORNING AND 3 CAPSULES WITH DINNER     LANTUS SOLOSTAR U-100 INSULIN glargine 100 units/mL (3mL) SubQ pen  Generic drug: insulin  Inject 30 Units into the skin every morning.     magnesium 250 mg Tab     metFORMIN 500 MG ER 24hr tablet  Commonly known as: GLUCOPHAGE-XR  TAKE 2 TABLETS TWICE DAILY WITH MEALS.     metoprolol succinate 50 MG 24 hr tablet  Commonly known as: TOPROL-XL  TAKE 1 TABLET DAILY WITH DINNER OR EVENING MEAL.     nitroGLYCERIN 0.4 MG SL tablet  Commonly known as: NITROSTAT  Place 1 tablet (0.4 mg total) under the tongue every 5 (five) minutes as needed for Chest pain.     NovoLIN R Regular U-100 Insuln 100 unit/mL injection  Generic drug: insulin regular  11 Units 3 (three) times daily before meals.     omega-3 acid ethyl esters 1 gram capsule  Commonly known as: LOVAZA  Take 2 capsules by mouth 2 (two) times daily.     * OZEMPIC 0.25 mg or 0.5 mg(2 mg/1.5 mL) Pnij  Generic drug: semaglutide  Inject 0.5 mg into the skin every 7 days.     * OZEMPIC 1 mg/dose (2 mg/1.5 mL) Pnij  Generic drug: semaglutide  Inject increase to 0.5 mg weekly. After 4 weeks, increase to 1 mg weekly.     pantoprazole 40 MG tablet  Commonly known as: PROTONIX  Take 1 tablet (40 mg total) by mouth once daily.     tiZANidine 4 MG tablet  Commonly known as: ZANAFLEX  Take 4 mg by mouth every 6 (six) hours as needed.     triazolam 0.25 MG Tab  Commonly known as: HALCION  TK 1 T PO QHS, prn         * This list has 2 medication(s) that are the same as other medications prescribed for you. Read the directions carefully, and ask your doctor or other care provider to review them with you.              Hold metformin 2 days      Time spent on the discharge of patient: 30 minutes    Lane Cuellar MD  Cardiology  [facility]

## 2020-11-11 NOTE — ASSESSMENT & PLAN NOTE
11/11/20 [facility] - EDP 19, LAD subtotal mid - distal vessel diffusely diseased, Cx - mid long 80% between OM1 and OM2, OM1 90% mid, OM2 80% mid, RCA 80% mid. SVG to D1 patent with good runoff, SVG to PDA patent with good runoff. LIMA to LAD - mid to distal graft with diffuse 80-90% - Distal LAD diffusely diseased     Will review with interventional staff for possible staged PCI of Cx/OM1/OM2  Home today  Continue current Rx

## 2020-11-11 NOTE — PROCEDURES
Driss Hernandez Jr. is a 70 y.o. male patient.    Temp: 98.6 °F (37 °C) (11/11/20 0723)  Pulse: (!) 53 (11/11/20 0723)  Resp: 17 (11/11/20 0723)  BP: (!) 184/91 (11/11/20 0723)  SpO2: 97 % (11/11/20 0723)  Weight: 90.2 kg (198 lb 13.7 oz) (11/10/20 1030)       Procedures     Pre-sedation Assessment:    1. ASA Score: ASA 3 - Patient with moderate systemic disease with functional limitations  2. Mallampati Class: III (soft and hard palate and base of uvula visible)  3. Patient or family history of any reaction to anesthesia or sedation: No  4. Plan for Sedation: Moderate  5. H&P within 30 days of the procedure and updated within 24 hrs of admission or registration: Yes    Lane Cuellar  11/11/2020

## 2020-11-11 NOTE — PROCEDURES
Driss Hernandez Jr. is a 70 y.o. male patient.    Temp: 98.6 °F (37 °C) (11/11/20 0723)  Pulse: (!) 53 (11/11/20 0723)  Resp: 17 (11/11/20 0723)  BP: (!) 184/91 (11/11/20 0723)  SpO2: 97 % (11/11/20 0723)  Weight: 90.2 kg (198 lb 13.7 oz) (11/10/20 1030)       Procedures     [facility] with graft restudy   Dr Cuellar  Pre-op Dx CAD, MAYEN  Post-op Dx same  Specimen none  EBL < 50 cc    11/11/20 [facility] - EDP 19, LAD subtotal mid - distal vessel diffusely diseased, Cx - mid long 80% between OM1 and OM2, OM1 90% mid, OM2 80% mid, RCA 80% mid. SVG to D1 patent with good runoff, SVG to PDA patent with good runoff. LIMA to LAD - mid to distal graft with diffuse 80-90% - Distal LAD diffusely diseased    Will review with interventional staff for possible staged PCI of Cx/OM1/OM2  Home today  Continue current Rx    Lane Cuellar  11/11/2020

## 2020-11-11 NOTE — HOSPITAL COURSE
11/11/20 [facility] - EDP 19, LAD subtotal mid - distal vessel diffusely diseased, Cx - mid long 80% between OM1 and OM2, OM1 90% mid, OM2 80% mid, RCA 80% mid. SVG to D1 patent with good runoff, SVG to PDA patent with good runoff. LIMA to LAD - mid to distal graft with diffuse 80-90% - Distal LAD diffusely diseased     Will review with interventional staff for possible staged PCI of Cx/OM1/OM2  Home today  Continue current Rx

## 2020-11-11 NOTE — Clinical Note
The catheter is inserted ostium   right coronary artery. Angiography performed of the right coronary arteries in multiple views.

## 2020-11-16 ENCOUNTER — PATIENT OUTREACH (OUTPATIENT)
Dept: ADMINISTRATIVE | Facility: HOSPITAL | Age: 70
End: 2020-11-16

## 2020-11-16 DIAGNOSIS — E11.9 DM TYPE 2 WITHOUT RETINOPATHY: Primary | ICD-10-CM

## 2020-11-18 ENCOUNTER — OFFICE VISIT (OUTPATIENT)
Dept: CARDIOLOGY | Facility: CLINIC | Age: 70
End: 2020-11-18
Payer: MEDICARE

## 2020-11-18 VITALS
HEART RATE: 41 BPM | WEIGHT: 198.44 LBS | HEIGHT: 67 IN | DIASTOLIC BLOOD PRESSURE: 78 MMHG | OXYGEN SATURATION: 97 % | BODY MASS INDEX: 31.15 KG/M2 | SYSTOLIC BLOOD PRESSURE: 128 MMHG

## 2020-11-18 DIAGNOSIS — E78.00 PURE HYPERCHOLESTEROLEMIA: ICD-10-CM

## 2020-11-18 DIAGNOSIS — Z95.1 HX OF CABG: Primary | ICD-10-CM

## 2020-11-18 DIAGNOSIS — I10 ESSENTIAL HYPERTENSION: ICD-10-CM

## 2020-11-18 DIAGNOSIS — I25.10 CORONARY ARTERY DISEASE INVOLVING NATIVE CORONARY ARTERY OF NATIVE HEART WITHOUT ANGINA PECTORIS: ICD-10-CM

## 2020-11-18 DIAGNOSIS — R07.89 CHEST PAIN, ATYPICAL: ICD-10-CM

## 2020-11-18 PROCEDURE — 1126F PR PAIN SEVERITY QUANTIFIED, NO PAIN PRESENT: ICD-10-PCS | Mod: HCNC,S$GLB,, | Performed by: INTERNAL MEDICINE

## 2020-11-18 PROCEDURE — 3074F SYST BP LT 130 MM HG: CPT | Mod: HCNC,CPTII,S$GLB, | Performed by: INTERNAL MEDICINE

## 2020-11-18 PROCEDURE — 3074F PR MOST RECENT SYSTOLIC BLOOD PRESSURE < 130 MM HG: ICD-10-PCS | Mod: HCNC,CPTII,S$GLB, | Performed by: INTERNAL MEDICINE

## 2020-11-18 PROCEDURE — 1101F PR PT FALLS ASSESS DOC 0-1 FALLS W/OUT INJ PAST YR: ICD-10-PCS | Mod: HCNC,CPTII,S$GLB, | Performed by: INTERNAL MEDICINE

## 2020-11-18 PROCEDURE — 1159F MED LIST DOCD IN RCRD: CPT | Mod: HCNC,S$GLB,, | Performed by: INTERNAL MEDICINE

## 2020-11-18 PROCEDURE — 3078F DIAST BP <80 MM HG: CPT | Mod: HCNC,CPTII,S$GLB, | Performed by: INTERNAL MEDICINE

## 2020-11-18 PROCEDURE — 3288F FALL RISK ASSESSMENT DOCD: CPT | Mod: HCNC,CPTII,S$GLB, | Performed by: INTERNAL MEDICINE

## 2020-11-18 PROCEDURE — 99999 PR PBB SHADOW E&M-EST. PATIENT-LVL V: ICD-10-PCS | Mod: PBBFAC,HCNC,, | Performed by: INTERNAL MEDICINE

## 2020-11-18 PROCEDURE — 3072F LOW RISK FOR RETINOPATHY: CPT | Mod: HCNC,S$GLB,, | Performed by: INTERNAL MEDICINE

## 2020-11-18 PROCEDURE — 1101F PT FALLS ASSESS-DOCD LE1/YR: CPT | Mod: HCNC,CPTII,S$GLB, | Performed by: INTERNAL MEDICINE

## 2020-11-18 PROCEDURE — 1126F AMNT PAIN NOTED NONE PRSNT: CPT | Mod: HCNC,S$GLB,, | Performed by: INTERNAL MEDICINE

## 2020-11-18 PROCEDURE — 99214 PR OFFICE/OUTPT VISIT, EST, LEVL IV, 30-39 MIN: ICD-10-PCS | Mod: HCNC,S$GLB,, | Performed by: INTERNAL MEDICINE

## 2020-11-18 PROCEDURE — 3078F PR MOST RECENT DIASTOLIC BLOOD PRESSURE < 80 MM HG: ICD-10-PCS | Mod: HCNC,CPTII,S$GLB, | Performed by: INTERNAL MEDICINE

## 2020-11-18 PROCEDURE — 3008F BODY MASS INDEX DOCD: CPT | Mod: HCNC,CPTII,S$GLB, | Performed by: INTERNAL MEDICINE

## 2020-11-18 PROCEDURE — 99214 OFFICE O/P EST MOD 30 MIN: CPT | Mod: HCNC,S$GLB,, | Performed by: INTERNAL MEDICINE

## 2020-11-18 PROCEDURE — 3072F PR LOW RISK FOR RETINOPATHY: ICD-10-PCS | Mod: HCNC,S$GLB,, | Performed by: INTERNAL MEDICINE

## 2020-11-18 PROCEDURE — 1159F PR MEDICATION LIST DOCUMENTED IN MEDICAL RECORD: ICD-10-PCS | Mod: HCNC,S$GLB,, | Performed by: INTERNAL MEDICINE

## 2020-11-18 PROCEDURE — 3008F PR BODY MASS INDEX (BMI) DOCUMENTED: ICD-10-PCS | Mod: HCNC,CPTII,S$GLB, | Performed by: INTERNAL MEDICINE

## 2020-11-18 PROCEDURE — 3288F PR FALLS RISK ASSESSMENT DOCUMENTED: ICD-10-PCS | Mod: HCNC,CPTII,S$GLB, | Performed by: INTERNAL MEDICINE

## 2020-11-18 PROCEDURE — 99999 PR PBB SHADOW E&M-EST. PATIENT-LVL V: CPT | Mod: PBBFAC,HCNC,, | Performed by: INTERNAL MEDICINE

## 2020-11-18 NOTE — PROGRESS NOTES
Subjective:    Patient ID:  Driss Hernandez Jr. is a 70 y.o. male who presents for follow-up of Post-op Evaluation      HPI     CAD/CABG x 3 2016 WJ, HTN, DM, HLD     Previously followed by Dr Hernandez  Here follow-up of coronary artery disease.  He denies any worsening cardiopulmonary complaints.  He's not expressing chest pain, shortness of breath or palpitations.  His main limitation stools his back and he's been taking muscle relaxers to help him sleep at night.  He denies any PND, orthopnea or lower edema.  He's not expressing dizziness, presyncope or syncope.  Says he goes to the gym 4 times a week with his wife.  Otherwise been in his usual state of health.     Here for follow-up of coronary artery disease.  He is here for follow-up as well to obtain preop clearance for back procedure including nerve block and possible sympathectomy by pain management.  He denies any cardiopulmonary complaints.  He has experienced no chest pain, shortness of breath or palpitations.  He denies any PND, orthopnea or lower extremity edema. He has not experienced any dizziness, presyncope or syncope.    11/11/20 [facility] - EDP 19, LAD subtotal mid - distal vessel diffusely diseased, Cx - mid long 80% between OM1 and OM2, OM1 90% mid, OM2 80% mid, RCA 80% mid. SVG to D1 patent with good runoff, SVG to PDA patent with good runoff. LIMA to LAD - mid to distal graft with diffuse 80-90% - Distal LAD diffusely diseased     Will review with interventional staff for possible staged PCI of Cx/OM1/OM2      PET stress: 7-17  CONCLUSIONS: NORMAL MYOCARDIAL PERFUSION PET STRESS TEST  1. The perfusion scan is free of evidence for myocardial ischemia or injury.   2. Resting wall motion is physiologic. Stress wall motion is physiologic.   3. LV function is normal at rest and stress.  (normal is >= 51%)  4. The ventricular volumes are normal at rest and stress.   5. The extracardiac distribution of radioactivity is normal.   6. There was no previous  study available to compare.      Stress test 3/18/20    Probably normal joseph myocardial perfusion study.    There is a mild intensity fixed defect in the mid anterior wall of the left ventricle, secondary to attenuation.    Gated perfusion images showed an ejection fraction of 64% post stress.    There is normal wall motion post stress.     Echo 3/18/20  · Normal left ventricular systolic function. The estimated ejection fraction is 60%.  · Concentric left ventricular remodeling.  · No wall motion abnormalities.  · Normal right ventricular systolic function.     Holter within normal limits     3/5/20 Recently having some sharp left sided CP - different from angina prior to CABG  EKG NSR 1st degree AVB - otherwise ok  Echo and lexiscan myoview for CP and known CAD     8/18/20 Saw Dr Willoughby today - c/o SOB - CXR clear. Labs pending  Reports a severe dry cough - denies fever. Some rib soreness from coughing  EKG NSR PVCs NSSTT changes  Suspect cough and SOB are from bronchitis and not CHF or myocardial ischemia. Recent echo and stress test ok  F/U BNP  If symptoms improve then OV 3 months. Consider LHC if symptoms persist without explanation     10/28/20 Continues to have episodes of CP and SOB    11/18/20 Did well after LHC. SOB improved some. Had some recent sharp sticking left sided CP - had been coughing 2 days before       Review of Systems   Constitution: Negative for decreased appetite.   HENT: Negative for ear discharge.    Eyes: Negative for blurred vision.   Endocrine: Negative for polyphagia.   Skin: Negative for nail changes.   Genitourinary: Negative for bladder incontinence.   Neurological: Negative for aphonia.   Psychiatric/Behavioral: Negative for hallucinations.   Allergic/Immunologic: Negative for hives.        Objective:    Physical Exam   Constitutional: He is oriented to person, place, and time. He appears well-developed and well-nourished. No distress.   HENT:   Head: Normocephalic and  atraumatic.   Eyes: Pupils are equal, round, and reactive to light. Conjunctivae and EOM are normal.   Neck: Normal range of motion. Neck supple. No thyromegaly present.   Cardiovascular: Normal rate, regular rhythm and normal heart sounds.   No murmur heard.  Pulmonary/Chest: Effort normal and breath sounds normal. No respiratory distress. He has no wheezes. He has no rales. He exhibits no tenderness.   Abdominal: Soft. Bowel sounds are normal.   Musculoskeletal:         General: No edema.   Neurological: He is alert and oriented to person, place, and time.   Skin: Skin is warm and dry.   Psychiatric: He has a normal mood and affect. His behavior is normal.         Assessment:       1. Hx of CABG    2. Coronary artery disease involving native coronary artery of native heart without angina pectoris    3. Essential hypertension    4. Pure hypercholesterolemia    5. Chest pain, atypical         Plan:       Will refer to Dr Nogueira to evaluate for PCI of Cx/OM1/OM2  Bradycardia well tolerated if this worsens will decrease metoprolol  OV 1 month

## 2020-11-20 ENCOUNTER — TELEPHONE (OUTPATIENT)
Dept: FAMILY MEDICINE | Facility: CLINIC | Age: 70
End: 2020-11-20

## 2020-11-27 ENCOUNTER — NURSE TRIAGE (OUTPATIENT)
Dept: ADMINISTRATIVE | Facility: CLINIC | Age: 70
End: 2020-11-27

## 2020-11-27 NOTE — TELEPHONE ENCOUNTER
Reason for Disposition   [1] Fever > 100.0 F (37.8 C) AND [2] diabetes mellitus or weak immune system (e.g., HIV positive, cancer chemo, splenectomy, organ transplant, chronic steroids)    Additional Information   Negative: Severe difficulty breathing (e.g., struggling for each breath, speaks in single words)   Negative: Bluish (or gray) lips or face now   Negative: [1] Rapid onset of cough AND [2] has hives   Negative: Coughing started suddenly after medicine, an allergic food or bee sting   Negative: [1] Difficulty breathing AND [2] exposure to flames, smoke, or fumes   Negative: [1] Stridor AND [2] difficulty breathing   Negative: Sounds like a life-threatening emergency to the triager   Negative: Choked on object of food that could be caught in the throat   Negative: Chest pain is main symptom   Negative: [1] Previous asthma attacks AND [2] this feels like asthma attack   Negative: Cough lasts > 3 weeks   Negative: Wet (productive) cough (i.e., white-yellow, yellow, green, or theodora colored sputum)   Negative: [1] Dry (non-productive) cough AND [2] within 14 days of COVID-19 Exposure   Negative: Chest pain  (Exception: MILD central chest pain, present only when coughing)   Negative: Difficulty breathing   Negative: Patient sounds very sick or weak to the triager   Negative: [1] Coughed up blood AND [2] > 1 tablespoon (15 ml) (Exception: blood-tinged sputum)   Negative: [1] Fever > 100.0 F (37.8 C) AND [2] bedridden (e.g., nursing home patient, CVA, chronic illness, recovering from surgery)   Negative: [1] Fever > 101 F (38.3 C) AND [2] age > 60   Negative: Fever > 103 F (39.4 C)    Protocols used: COUGH - ACUTE NON-PRODUCTIVE-A-AH  spouse reports pt with  started today, coughing started at 8pm, diarrhea and SOB has been having it, just had angiogram and due for stents. covid 19 negative three weeks ago. no CP, no SOB. gets SOB moving around. hx DM. hx CABG 5 years ago. rec ED/[facility].  Spouse states she will talk with pt. Office message sent to PCP to call pt at spouse request. Call back with questions.

## 2020-11-27 NOTE — TELEPHONE ENCOUNTER
Spoke with patient who has no symptoms of fever or SOB but insists on getting an order for COVID testing. Please advise

## 2020-11-28 ENCOUNTER — PATIENT OUTREACH (OUTPATIENT)
Dept: ADMINISTRATIVE | Facility: OTHER | Age: 70
End: 2020-11-28

## 2020-11-28 NOTE — TELEPHONE ENCOUNTER
Please advise pt to go to [facility] for testing, always explain that is where testing being done, will need to be seen there

## 2020-11-29 NOTE — PROGRESS NOTES
Health Maintenance Due   Topic Date Due    Shingles Vaccine (1 of 2) 10/22/1999     Updates were requested from care everywhere.  Chart was reviewed for overdue Proactive [facility] Encounters ([facility]ARE[facility]) topics (CRS, Breast Cancer Screening, Eye exam)  Health Maintenance has been updated.  LINKS immunization registry triggered.  Immunizations were reconciled.

## 2020-11-30 NOTE — TELEPHONE ENCOUNTER
Poke to patient about message below. Patient states he feels much better today and will wait until his pre op covid testing.

## 2020-12-01 ENCOUNTER — OFFICE VISIT (OUTPATIENT)
Dept: CARDIOLOGY | Facility: CLINIC | Age: 70
End: 2020-12-01
Payer: MEDICARE

## 2020-12-01 VITALS
BODY MASS INDEX: 31.11 KG/M2 | OXYGEN SATURATION: 98 % | WEIGHT: 198.19 LBS | HEIGHT: 67 IN | SYSTOLIC BLOOD PRESSURE: 151 MMHG | HEART RATE: 61 BPM | DIASTOLIC BLOOD PRESSURE: 70 MMHG | RESPIRATION RATE: 16 BRPM

## 2020-12-01 DIAGNOSIS — E66.09 CLASS 1 OBESITY DUE TO EXCESS CALORIES WITH SERIOUS COMORBIDITY AND BODY MASS INDEX (BMI) OF 31.0 TO 31.9 IN ADULT: ICD-10-CM

## 2020-12-01 DIAGNOSIS — R53.83 FATIGUE, UNSPECIFIED TYPE: ICD-10-CM

## 2020-12-01 DIAGNOSIS — I70.0 ABDOMINAL AORTIC ATHEROSCLEROSIS: ICD-10-CM

## 2020-12-01 DIAGNOSIS — E78.5 HYPERLIPIDEMIA ASSOCIATED WITH TYPE 2 DIABETES MELLITUS: ICD-10-CM

## 2020-12-01 DIAGNOSIS — Z79.4 DIABETES MELLITUS DUE TO UNDERLYING CONDITION WITH HYPEROSMOLARITY WITHOUT COMA, WITH LONG-TERM CURRENT USE OF INSULIN: ICD-10-CM

## 2020-12-01 DIAGNOSIS — E08.00 DIABETES MELLITUS DUE TO UNDERLYING CONDITION WITH HYPEROSMOLARITY WITHOUT COMA, WITH LONG-TERM CURRENT USE OF INSULIN: ICD-10-CM

## 2020-12-01 DIAGNOSIS — I25.119 CORONARY ARTERY DISEASE INVOLVING NATIVE CORONARY ARTERY OF NATIVE HEART WITH ANGINA PECTORIS: Primary | ICD-10-CM

## 2020-12-01 DIAGNOSIS — I10 ESSENTIAL HYPERTENSION: ICD-10-CM

## 2020-12-01 DIAGNOSIS — E11.69 HYPERLIPIDEMIA ASSOCIATED WITH TYPE 2 DIABETES MELLITUS: ICD-10-CM

## 2020-12-01 DIAGNOSIS — Z95.1 HX OF CABG: ICD-10-CM

## 2020-12-01 DIAGNOSIS — I48.92 ATRIAL FLUTTER, UNSPECIFIED TYPE: ICD-10-CM

## 2020-12-01 PROCEDURE — 1159F MED LIST DOCD IN RCRD: CPT | Mod: HCNC,S$GLB,, | Performed by: INTERNAL MEDICINE

## 2020-12-01 PROCEDURE — 3078F PR MOST RECENT DIASTOLIC BLOOD PRESSURE < 80 MM HG: ICD-10-PCS | Mod: HCNC,CPTII,S$GLB, | Performed by: INTERNAL MEDICINE

## 2020-12-01 PROCEDURE — 3072F PR LOW RISK FOR RETINOPATHY: ICD-10-PCS | Mod: HCNC,S$GLB,, | Performed by: INTERNAL MEDICINE

## 2020-12-01 PROCEDURE — 3077F SYST BP >= 140 MM HG: CPT | Mod: HCNC,CPTII,S$GLB, | Performed by: INTERNAL MEDICINE

## 2020-12-01 PROCEDURE — 3008F PR BODY MASS INDEX (BMI) DOCUMENTED: ICD-10-PCS | Mod: HCNC,CPTII,S$GLB, | Performed by: INTERNAL MEDICINE

## 2020-12-01 PROCEDURE — 99499 UNLISTED E&M SERVICE: CPT | Mod: S$GLB,,, | Performed by: INTERNAL MEDICINE

## 2020-12-01 PROCEDURE — 3008F BODY MASS INDEX DOCD: CPT | Mod: HCNC,CPTII,S$GLB, | Performed by: INTERNAL MEDICINE

## 2020-12-01 PROCEDURE — 3288F PR FALLS RISK ASSESSMENT DOCUMENTED: ICD-10-PCS | Mod: HCNC,CPTII,S$GLB, | Performed by: INTERNAL MEDICINE

## 2020-12-01 PROCEDURE — 3052F PR MOST RECENT HEMOGLOBIN A1C LEVEL 8.0 - < 9.0%: ICD-10-PCS | Mod: HCNC,CPTII,S$GLB, | Performed by: INTERNAL MEDICINE

## 2020-12-01 PROCEDURE — 1101F PR PT FALLS ASSESS DOC 0-1 FALLS W/OUT INJ PAST YR: ICD-10-PCS | Mod: HCNC,CPTII,S$GLB, | Performed by: INTERNAL MEDICINE

## 2020-12-01 PROCEDURE — 99499 RISK ADDL DX/OHS AUDIT: ICD-10-PCS | Mod: S$GLB,,, | Performed by: INTERNAL MEDICINE

## 2020-12-01 PROCEDURE — 1101F PT FALLS ASSESS-DOCD LE1/YR: CPT | Mod: HCNC,CPTII,S$GLB, | Performed by: INTERNAL MEDICINE

## 2020-12-01 PROCEDURE — 99214 OFFICE O/P EST MOD 30 MIN: CPT | Mod: HCNC,S$GLB,, | Performed by: INTERNAL MEDICINE

## 2020-12-01 PROCEDURE — 99999 PR PBB SHADOW E&M-EST. PATIENT-LVL V: ICD-10-PCS | Mod: PBBFAC,HCNC,, | Performed by: INTERNAL MEDICINE

## 2020-12-01 PROCEDURE — 3077F PR MOST RECENT SYSTOLIC BLOOD PRESSURE >= 140 MM HG: ICD-10-PCS | Mod: HCNC,CPTII,S$GLB, | Performed by: INTERNAL MEDICINE

## 2020-12-01 PROCEDURE — 99999 PR PBB SHADOW E&M-EST. PATIENT-LVL V: CPT | Mod: PBBFAC,HCNC,, | Performed by: INTERNAL MEDICINE

## 2020-12-01 PROCEDURE — 1159F PR MEDICATION LIST DOCUMENTED IN MEDICAL RECORD: ICD-10-PCS | Mod: HCNC,S$GLB,, | Performed by: INTERNAL MEDICINE

## 2020-12-01 PROCEDURE — 99214 PR OFFICE/OUTPT VISIT, EST, LEVL IV, 30-39 MIN: ICD-10-PCS | Mod: HCNC,S$GLB,, | Performed by: INTERNAL MEDICINE

## 2020-12-01 PROCEDURE — 3052F HG A1C>EQUAL 8.0%<EQUAL 9.0%: CPT | Mod: HCNC,CPTII,S$GLB, | Performed by: INTERNAL MEDICINE

## 2020-12-01 PROCEDURE — 3288F FALL RISK ASSESSMENT DOCD: CPT | Mod: HCNC,CPTII,S$GLB, | Performed by: INTERNAL MEDICINE

## 2020-12-01 PROCEDURE — 3078F DIAST BP <80 MM HG: CPT | Mod: HCNC,CPTII,S$GLB, | Performed by: INTERNAL MEDICINE

## 2020-12-01 PROCEDURE — 3072F LOW RISK FOR RETINOPATHY: CPT | Mod: HCNC,S$GLB,, | Performed by: INTERNAL MEDICINE

## 2020-12-01 PROCEDURE — 1126F PR PAIN SEVERITY QUANTIFIED, NO PAIN PRESENT: ICD-10-PCS | Mod: HCNC,S$GLB,, | Performed by: INTERNAL MEDICINE

## 2020-12-01 PROCEDURE — 1126F AMNT PAIN NOTED NONE PRSNT: CPT | Mod: HCNC,S$GLB,, | Performed by: INTERNAL MEDICINE

## 2020-12-01 RX ORDER — SODIUM CHLORIDE 9 MG/ML
INJECTION, SOLUTION INTRAVENOUS ONCE
Status: CANCELLED | OUTPATIENT
Start: 2020-12-10

## 2020-12-01 RX ORDER — ISOSORBIDE MONONITRATE 30 MG/1
30 TABLET, EXTENDED RELEASE ORAL DAILY
Qty: 30 TABLET | Refills: 0 | Status: SHIPPED | OUTPATIENT
Start: 2020-12-01 | End: 2020-12-01

## 2020-12-01 RX ORDER — SODIUM CHLORIDE 0.9 % (FLUSH) 0.9 %
10 SYRINGE (ML) INJECTION EVERY 8 HOURS PRN
Status: DISCONTINUED | OUTPATIENT
Start: 2020-12-10 | End: 2020-12-10 | Stop reason: HOSPADM

## 2020-12-01 RX ORDER — ISOSORBIDE MONONITRATE 30 MG/1
30 TABLET, EXTENDED RELEASE ORAL DAILY
Qty: 30 TABLET | Refills: 11 | Status: SHIPPED | OUTPATIENT
Start: 2020-12-01 | End: 2020-12-01 | Stop reason: SDUPTHER

## 2020-12-01 NOTE — PROGRESS NOTES
CARDIOLOGY CLINIC VISIT        HISTORY OF PRESENT ILLNESS:     Driss Hernandez is referred by Dr. Cuellar for evaluation for PCI.   He was having episodes of chest discomfort.  Decreased exercise capacity.  Difficulty performing ADLs.  Secondary to symptoms Dr. Cuellar performed coronary angiography.[facility] from 11/11/20 reviewed.  Since the procedure he states that the majority of chest discomfort has resolved.  He has occasional episodes of a sticking sensation.  He had a day he had an episode for which he took a nitroglycerin which provided some relief.  We reviewed and discussed his angiography.  We discussed PCI at length.  Patient is amenable and we will proceed with plans for intervention.  His also been feeling fatigued.  EKG from 11/05 showed atrial flutter with slow ventricular response.    Left heart catheterization 11/11/2020:    LAD subtotal mid - distal vessel diffusely diseased, Cx - mid long 80% between OM1 and OM2, OM1 90% mid, OM2 80% mid, RCA 80% mid. SVG to D1 patent with good runoff, SVG to PDA patent with good runoff. LIMA to LAD - mid to distal graft with diffuse 80-90% - Distal LAD diffusely diseased.      CARDIOVASCULAR HISTORY:     CAD/CABG x 3 2016 WJ, HTN, DM, HLD    PAST MEDICAL HISTORY:     Past Medical History:   Diagnosis Date    Chronic midline low back pain without sciatica 10/2/2017    Coronary artery disease involving native coronary artery of native heart 3/5/2020    Diabetes mellitus with neurological manifestations, uncontrolled 1/24/2017    Diabetic polyneuropathy associated with type 2 diabetes mellitus 1/24/2017    Diabetic polyneuropathy associated with type 2 diabetes mellitus     Essential hypertension 1/24/2017    Gastroesophageal reflux disease 1/24/2017    Hyperlipidemia 1/24/2017    Insomnia 1/24/2017    Long-term insulin use 1/24/2017    Lumbar spondylosis 11/13/2017    Nuclear sclerosis of both eyes 8/24/2017    Obesity     Stage 3 chronic kidney disease  2/13/2019    Uncontrolled type 2 diabetes mellitus without complication, with long-term current use of insulin 1/24/2017       PAST SURGICAL HISTORY:     Past Surgical History:   Procedure Laterality Date    BACK SURGERY      2002    CATARACT EXTRACTION W/  INTRAOCULAR LENS IMPLANT Right 08/29/2017    Dr. Hong    COLONOSCOPY N/A 2/21/2017    Procedure: COLONOSCOPY;  Surgeon: Robb Rosado MD;  Location: [facility] ENDO;  Service: Endoscopy;  Laterality: N/A;    CORONARY ANGIOGRAPHY INCLUDING BYPASS GRAFTS WITH CATHETERIZATION OF LEFT HEART N/A 11/11/2020    Procedure: ANGIOGRAM, CORONARY, INCLUDING BYPASS GRAFT, WITH LEFT HEART CATHETERIZATION;  Surgeon: Lane Cuellar MD;  Location: [facility] CATH LAB;  Service: Cardiology;  Laterality: N/A;  RN PRE OP 11-5-2020. --COVID NEGATIVE ON  11-. C A    CORONARY ARTERY BYPASS GRAFT      March 2016    EPIDURAL STEROID INJECTION Bilateral 11/14/2018    Procedure: Lumbar Medial Branch Blocks;  Surgeon: Eze Byrd Jr., MD;  Location: [facility] ENDO;  Service: Pain Management;  Laterality: Bilateral;  Bilateral Lumbar Medial Branch Blocks L2, L3, L4, L5    64994  12871    Arrive @ 1150; NO Sedation    EPIDURAL STEROID INJECTION Bilateral 11/28/2018    Procedure: Injection, Steroid, Epidural;  Surgeon: Eze Byrd Jr., MD;  Location: [facility];  Service: Pain Management;  Laterality: Bilateral;  Bilateral Sacroiliac Joint Steroid Injections    01129    Arrive @ 0910    EPIDURAL STEROID INJECTION Bilateral 3/20/2019    Procedure: Injection, Steroid, Sacroiliiac Joint;  Surgeon: Eze Byrd Jr., MD;  Location: [facility];  Service: Pain Management;  Laterality: Bilateral;  Bilateral Sacroiliac Joint Steroid Injections    80387    Arrive @ 1145; Trigger point injections also?    TONSILLECTOMY         ALLERGIES AND MEDICATION:     Review of patient's allergies indicates:   Allergen Reactions    Penicillins Other (See Comments)     Unknown  "reaction, Had a reaction as a child    Shrimp Itching     Hand itching        Medication List          Accurate as of December 1, 2020 11:45 AM. If you have any questions, ask your nurse or doctor.            START taking these medications    isosorbide mononitrate 30 MG 24 hr tablet  Commonly known as: IMDUR  Take 1 tablet (30 mg total) by mouth once daily.  Started by: Bob Nogueira MD        CONTINUE taking these medications    ACCU-CHEK MOIZ CONTROL SOLN Soln  Generic drug: blood glucose control high,low     ACCU-CHEK MOIZ PLUS TEST STRP Strp  Generic drug: blood sugar diagnostic  TEST THREE TIMES DAILY     ACCU-CHEK SOFTCLIX LANCETS Misc  Generic drug: lancets     amLODIPine 10 MG tablet  Commonly known as: NORVASC  TAKE 1 TABLET(10 MG) BY MOUTH EVERY DAY     aspirin 325 MG tablet     atorvastatin 40 MG tablet  Commonly known as: LIPITOR  TAKE 1 TABLET (40 MG TOTAL) BY MOUTH EVERY MORNING.     BD ALCOHOL SWABS Padm  Generic drug: alcohol swabs     BD ULTRA-FINE ROLAND PEN NEEDLE 32 gauge x 5/32" Ndle  Generic drug: pen needle, diabetic     clopidogreL 75 mg tablet  Commonly known as: PLAVIX  Take 1 tablet (75 mg total) by mouth once daily. 8 pills the first day     ergocalciferol 50,000 unit Cap  Commonly known as: ERGOCALCIFEROL  TAKE ONE CAPSULE BY MOUTH WEEKLY     fenofibrate 160 MG Tab  Take 1 tablet (160 mg total) by mouth every morning.     gabapentin 100 MG capsule  Commonly known as: NEURONTIN  TAKE 2 CAPSULES IN THE MORNING AND 3 CAPSULES WITH DINNER     LANTUS SOLOSTAR U-100 INSULIN glargine 100 units/mL (3mL) SubQ pen  Generic drug: insulin  Inject 30 Units into the skin every morning.     magnesium 250 mg Tab     metFORMIN 500 MG ER 24hr tablet  Commonly known as: GLUCOPHAGE-XR  TAKE 2 TABLETS TWICE DAILY WITH MEALS.     metoprolol succinate 50 MG 24 hr tablet  Commonly known as: TOPROL-XL  TAKE 1 TABLET DAILY WITH DINNER OR EVENING MEAL.     nitroGLYCERIN 0.4 MG SL tablet  Commonly known as: " NITROSTAT  Place 1 tablet (0.4 mg total) under the tongue every 5 (five) minutes as needed for Chest pain.     NovoLIN R Regular U-100 Insuln 100 unit/mL injection  Generic drug: insulin regular     omega-3 acid ethyl esters 1 gram capsule  Commonly known as: LOVAZA     * OZEMPIC 0.25 mg or 0.5 mg(2 mg/1.5 mL) Pnij  Generic drug: semaglutide  Inject 0.5 mg into the skin every 7 days.     * OZEMPIC 1 mg/dose (2 mg/1.5 mL) Pnij  Generic drug: semaglutide  Inject increase to 0.5 mg weekly. After 4 weeks, increase to 1 mg weekly.     pantoprazole 40 MG tablet  Commonly known as: PROTONIX  Take 1 tablet (40 mg total) by mouth once daily.     tiZANidine 4 MG tablet  Commonly known as: ZANAFLEX     triazolam 0.25 MG Tab  Commonly known as: HALCION  TK 1 T PO QHS, prn         * This list has 2 medication(s) that are the same as other medications prescribed for you. Read the directions carefully, and ask your doctor or other care provider to review them with you.               Where to Get Your Medications      These medications were sent to [id number] - [place] [address]  [address] [facility] [address]  [address] [id number]    Hours: 24-hours Phone: [phone]   · isosorbide mononitrate 30 MG 24 hr tablet         SOCIAL HISTORY:     Social History     Socioeconomic History    Marital status:      Spouse name: Not on file    Number of children: Not on file    Years of education: Not on file    Highest education level: Not on file   Occupational History    Not on file   Social Needs    Financial resource strain: Not hard at all    Food insecurity     Worry: Never true     Inability: Never true    Transportation needs     Medical: No     Non-medical: No   Tobacco Use    Smoking status: Never Smoker    Smokeless tobacco: Never Used   Substance and Sexual Activity    Alcohol use: No     Frequency: Never     Drinks per session: 1 or 2     Binge frequency: Never     Drug use: No    Sexual activity: Yes     Partners: Female   Lifestyle    Physical activity     Days per week: 1 day     Minutes per session: 60 min    Stress: Only a little   Relationships    Social connections     Talks on phone: More than three times a week     Gets together: Twice a week     Attends [employer] service: Not on file     Active member of club or organization: Yes     Attends meetings of clubs or organizations: More than 4 times per year     Relationship status:    Other Topics Concern    Not on file   Social History Narrative    Not on file       FAMILY HISTORY:     Family History   Problem Relation Age of Onset    Depression Mother     Heart disease Mother     Stroke Father     No Known Problems Sister     Blindness Brother     Diabetes Sister     No Known Problems Sister     No Known Problems Maternal Aunt     No Known Problems Maternal Uncle     No Known Problems Paternal Aunt     No Known Problems Paternal Uncle     No Known Problems Maternal Grandmother     No Known Problems Maternal Grandfather     No Known Problems Paternal Grandmother     No Known Problems Paternal Grandfather     Amblyopia Neg Hx     Cancer Neg Hx     Cataracts Neg Hx     Glaucoma Neg Hx     Hypertension Neg Hx     Macular degeneration Neg Hx     Retinal detachment Neg Hx     Strabismus Neg Hx     Thyroid disease Neg Hx        REVIEW OF SYSTEMS:   Review of Systems   Respiratory: Negative for sputum production, shortness of breath and wheezing.    Cardiovascular: Positive for chest pain. Negative for palpitations, orthopnea, claudication, leg swelling and PND.   Gastrointestinal: Negative for abdominal pain, heartburn, nausea and vomiting.   Neurological: Negative for dizziness, speech change, focal weakness, loss of consciousness, weakness and headaches.       PHYSICAL EXAM:     Vitals:    12/01/20 1029   BP: (!) 151/70   Pulse: 61   Resp: 16    Body mass index is 31.04 kg/m².  Weight:  "89.9 kg (198 lb 3.1 oz)   Height: 5' 7" (170.2 cm)     Physical Exam  Vitals signs reviewed.   Constitutional:       General: He is not in acute distress.     Appearance: He is well-developed and overweight. He is not diaphoretic.   Neck:      Vascular: No carotid bruit or JVD.   Cardiovascular:      Rate and Rhythm: Normal rate and regular rhythm.      Pulses: Normal pulses.      Heart sounds: Normal heart sounds.   Pulmonary:      Effort: Pulmonary effort is normal.      Breath sounds: Normal breath sounds.   Abdominal:      General: Bowel sounds are normal.      Palpations: Abdomen is soft.      Tenderness: There is no abdominal tenderness.   Musculoskeletal:      Right lower leg: No edema.      Left lower leg: No edema.   Neurological:      Mental Status: He is alert and oriented to person, place, and time.   Psychiatric:         Speech: Speech normal.         Behavior: Behavior normal.         DATA:   EKG: (personally reviewed tracing)  11/05/2020-atrial flutter with slow ventricular response.  Laboratory:  CBC:  Recent Labs   Lab 11/26/18  0845 08/18/20  0928 11/05/20  1305   WBC 5.20 7.02 7.99   Hemoglobin 13.2 L 11.5 L 12.2 L   Hematocrit 43.0 39.7 L 39.2 L   Platelets 204 227 248       CHEMISTRIES:  Recent Labs   Lab 06/12/19  0944 08/18/20  0928 11/05/20  1305   Glucose 150 H 147 H 112 H   Sodium 140 140 141   Potassium 4.8 5.1 4.6   BUN 21 27 H 26 H   Creatinine 1.3 1.2 1.2   eGFR if  >60.0 >60.0 >60   eGFR if non African American 55.7 A >60.0 >60   Calcium 9.7 9.8 11.0 H       CARDIAC BIOMARKERS:        COAGS:  Recent Labs   Lab 11/05/20  1305   INR 1.0       LIPIDS/LFTS:  Recent Labs   Lab 02/22/19  1546 03/26/19  0805 05/15/19  0828 08/29/19 08/18/20  0928   Cholesterol  --  179 139  --  137   Triglycerides  --  149 458 H 188 H 125   HDL  --  34 L 23 L 27 L 25 L   LDL Cholesterol  --  115.2 Invalid, Trig>400.0 72 87.0   Non-HDL Cholesterol  --  145 116 100 112   AST 26  --  23  --  18 "   ALT 33  --  28  --  18       Cardiovascular Testin/11/20 C - EDP 19, LAD subtotal mid - distal vessel diffusely diseased, Cx - mid long 80% between OM1 and OM2, OM1 90% mid, OM2 80% mid, RCA 80% mid. SVG to D1 patent with good runoff, SVG to PDA patent with good runoff. LIMA to LAD - mid to distal graft with diffuse 80-90% - Distal LAD diffusely diseased     Will review with interventional staff for possible staged PCI of Cx/OM1/OM2      PET stress: 7-17  CONCLUSIONS: NORMAL MYOCARDIAL PERFUSION PET STRESS TEST  1. The perfusion scan is free of evidence for myocardial ischemia or injury.   2. Resting wall motion is physiologic. Stress wall motion is physiologic.   3. LV function is normal at rest and stress.  (normal is >= 51%)  4. The ventricular volumes are normal at rest and stress.   5. The extracardiac distribution of radioactivity is normal.   6. There was no previous study available to compare.      Stress test 3/18/20    Probably normal joseph myocardial perfusion study.    There is a mild intensity fixed defect in the mid anterior wall of the left ventricle, secondary to attenuation.    Gated perfusion images showed an ejection fraction of 64% post stress.    There is normal wall motion post stress.     Echo 3/18/20  · Normal left ventricular systolic function. The estimated ejection fraction is 60%.  · Concentric left ventricular remodeling.  · No wall motion abnormalities.  · Normal right ventricular systolic function.    ASSESSMENT:     1. Coronary artery disease/Hx CABG:  Recent angina episodes  2. Hypertension:  Elevated  3. Hyperlipidemia:  LDL 87  4. Atrial flutter  5. Obesity    PLAN:     1. Coronary artery disease/Hx CABG/angina:  Discussed percutaneous coronary intervention.  Patient is amenable.  Plan is to intervene upon OM1 and left circumflex possibly into the 2nd obtuse marginal.  EBU 3.5 guide.  Right radial access.  Risks, benefits and alternatives of the catheterization  procedure were discussed with the patient.The risks of coronary angiography include but are not limited to: bleeding, infection, death, heart attack, arrhythmia, kidney injury or failure, potential need for dialysis, allergic reactions, stroke, need for emergency surgery, hematoma, pseudoaneurysm etc.  Should stenting be indicated, the patient has agreed to dual anti-platelet therapy for 1-consecutive year with a drug-eluting stent and a minimum of 1-month with the use of a bare metal stent. Additionally, pt is aware that non-compliance is likely to result in stent clotting with heart attack, heart failure, and/or death  The risks of moderate sedation include hypotension, respiratory depression, arrhythmias, bronchospasm, and death. Informed consent was obtained and the  patient is agreeable to proceed with the procedure. Consent was placed on the chart.  2. Hypertension:  Add Imdur 30 mg p.o. q.d..  3. Hyperlipidemia:  Continue current management  4.  Atrial flutter:  Follow-up EKG  5. Return to clinic postprocedure      Bob Nogueira MD, MPH, FACC, [facility]

## 2020-12-01 NOTE — H&P (VIEW-ONLY)
CARDIOLOGY CLINIC VISIT        HISTORY OF PRESENT ILLNESS:     Driss Hernandez is referred by Dr. Cuellar for evaluation for PCI.   He was having episodes of chest discomfort.  Decreased exercise capacity.  Difficulty performing ADLs.  Secondary to symptoms Dr. Cuellar performed coronary angiography.[facility] from 11/11/20 reviewed.  Since the procedure he states that the majority of chest discomfort has resolved.  He has occasional episodes of a sticking sensation.  He had a day he had an episode for which he took a nitroglycerin which provided some relief.  We reviewed and discussed his angiography.  We discussed PCI at length.  Patient is amenable and we will proceed with plans for intervention.  His also been feeling fatigued.  EKG from 11/05 showed atrial flutter with slow ventricular response.    Left heart catheterization 11/11/2020:    LAD subtotal mid - distal vessel diffusely diseased, Cx - mid long 80% between OM1 and OM2, OM1 90% mid, OM2 80% mid, RCA 80% mid. SVG to D1 patent with good runoff, SVG to PDA patent with good runoff. LIMA to LAD - mid to distal graft with diffuse 80-90% - Distal LAD diffusely diseased.      CARDIOVASCULAR HISTORY:     CAD/CABG x 3 2016 WJ, HTN, DM, HLD    PAST MEDICAL HISTORY:     Past Medical History:   Diagnosis Date    Chronic midline low back pain without sciatica 10/2/2017    Coronary artery disease involving native coronary artery of native heart 3/5/2020    Diabetes mellitus with neurological manifestations, uncontrolled 1/24/2017    Diabetic polyneuropathy associated with type 2 diabetes mellitus 1/24/2017    Diabetic polyneuropathy associated with type 2 diabetes mellitus     Essential hypertension 1/24/2017    Gastroesophageal reflux disease 1/24/2017    Hyperlipidemia 1/24/2017    Insomnia 1/24/2017    Long-term insulin use 1/24/2017    Lumbar spondylosis 11/13/2017    Nuclear sclerosis of both eyes 8/24/2017    Obesity     Stage 3 chronic kidney disease  2/13/2019    Uncontrolled type 2 diabetes mellitus without complication, with long-term current use of insulin 1/24/2017       PAST SURGICAL HISTORY:     Past Surgical History:   Procedure Laterality Date    BACK SURGERY      2002    CATARACT EXTRACTION W/  INTRAOCULAR LENS IMPLANT Right 08/29/2017    Dr. Hong    COLONOSCOPY N/A 2/21/2017    Procedure: COLONOSCOPY;  Surgeon: Robb Rosado MD;  Location: [facility] ENDO;  Service: Endoscopy;  Laterality: N/A;    CORONARY ANGIOGRAPHY INCLUDING BYPASS GRAFTS WITH CATHETERIZATION OF LEFT HEART N/A 11/11/2020    Procedure: ANGIOGRAM, CORONARY, INCLUDING BYPASS GRAFT, WITH LEFT HEART CATHETERIZATION;  Surgeon: Lane Cuellar MD;  Location: [facility] CATH LAB;  Service: Cardiology;  Laterality: N/A;  RN PRE OP 11-5-2020. --COVID NEGATIVE ON  11-. C A    CORONARY ARTERY BYPASS GRAFT      March 2016    EPIDURAL STEROID INJECTION Bilateral 11/14/2018    Procedure: Lumbar Medial Branch Blocks;  Surgeon: Eze Byrd Jr., MD;  Location: [facility] ENDO;  Service: Pain Management;  Laterality: Bilateral;  Bilateral Lumbar Medial Branch Blocks L2, L3, L4, L5    79689  12754    Arrive @ 1150; NO Sedation    EPIDURAL STEROID INJECTION Bilateral 11/28/2018    Procedure: Injection, Steroid, Epidural;  Surgeon: Eze Byrd Jr., MD;  Location: [facility];  Service: Pain Management;  Laterality: Bilateral;  Bilateral Sacroiliac Joint Steroid Injections    [id number]    Arrive @ 0910    EPIDURAL STEROID INJECTION Bilateral 3/20/2019    Procedure: Injection, Steroid, Sacroiliiac Joint;  Surgeon: Eze Byrd Jr., MD;  Location: [facility];  Service: Pain Management;  Laterality: Bilateral;  Bilateral Sacroiliac Joint Steroid Injections    [id number]    Arrive @ 1145; Trigger point injections also?    TONSILLECTOMY         ALLERGIES AND MEDICATION:     Review of patient's allergies indicates:   Allergen Reactions    Penicillins Other (See Comments)     Unknown  "reaction, Had a reaction as a child    Shrimp Itching     Hand itching        Medication List          Accurate as of December 1, 2020 11:45 AM. If you have any questions, ask your nurse or doctor.            START taking these medications    isosorbide mononitrate 30 MG 24 hr tablet  Commonly known as: IMDUR  Take 1 tablet (30 mg total) by mouth once daily.  Started by: Bob Nogueira MD        CONTINUE taking these medications    ACCU-CHEK MOIZ CONTROL SOLN Soln  Generic drug: blood glucose control high,low     ACCU-CHEK MOIZ PLUS TEST STRP Strp  Generic drug: blood sugar diagnostic  TEST THREE TIMES DAILY     ACCU-CHEK SOFTCLIX LANCETS Misc  Generic drug: lancets     amLODIPine 10 MG tablet  Commonly known as: NORVASC  TAKE 1 TABLET(10 MG) BY MOUTH EVERY DAY     aspirin 325 MG tablet     atorvastatin 40 MG tablet  Commonly known as: LIPITOR  TAKE 1 TABLET (40 MG TOTAL) BY MOUTH EVERY MORNING.     BD ALCOHOL SWABS Padm  Generic drug: alcohol swabs     BD ULTRA-FINE ROLAND PEN NEEDLE 32 gauge x 5/32" Ndle  Generic drug: pen needle, diabetic     clopidogreL 75 mg tablet  Commonly known as: PLAVIX  Take 1 tablet (75 mg total) by mouth once daily. 8 pills the first day     ergocalciferol 50,000 unit Cap  Commonly known as: ERGOCALCIFEROL  TAKE ONE CAPSULE BY MOUTH WEEKLY     fenofibrate 160 MG Tab  Take 1 tablet (160 mg total) by mouth every morning.     gabapentin 100 MG capsule  Commonly known as: NEURONTIN  TAKE 2 CAPSULES IN THE MORNING AND 3 CAPSULES WITH DINNER     LANTUS SOLOSTAR U-100 INSULIN glargine 100 units/mL (3mL) SubQ pen  Generic drug: insulin  Inject 30 Units into the skin every morning.     magnesium 250 mg Tab     metFORMIN 500 MG ER 24hr tablet  Commonly known as: GLUCOPHAGE-XR  TAKE 2 TABLETS TWICE DAILY WITH MEALS.     metoprolol succinate 50 MG 24 hr tablet  Commonly known as: TOPROL-XL  TAKE 1 TABLET DAILY WITH DINNER OR EVENING MEAL.     nitroGLYCERIN 0.4 MG SL tablet  Commonly known as: " NITROSTAT  Place 1 tablet (0.4 mg total) under the tongue every 5 (five) minutes as needed for Chest pain.     NovoLIN R Regular U-100 Insuln 100 unit/mL injection  Generic drug: insulin regular     omega-3 acid ethyl esters 1 gram capsule  Commonly known as: LOVAZA     * OZEMPIC 0.25 mg or 0.5 mg(2 mg/1.5 mL) Pnij  Generic drug: semaglutide  Inject 0.5 mg into the skin every 7 days.     * OZEMPIC 1 mg/dose (2 mg/1.5 mL) Pnij  Generic drug: semaglutide  Inject increase to 0.5 mg weekly. After 4 weeks, increase to 1 mg weekly.     pantoprazole 40 MG tablet  Commonly known as: PROTONIX  Take 1 tablet (40 mg total) by mouth once daily.     tiZANidine 4 MG tablet  Commonly known as: ZANAFLEX     triazolam 0.25 MG Tab  Commonly known as: HALCION  TK 1 T PO QHS, prn         * This list has 2 medication(s) that are the same as other medications prescribed for you. Read the directions carefully, and ask your doctor or other care provider to review them with you.               Where to Get Your Medications      These medications were sent to [id number] - [place] [address]  [address] [facility] [address]  [address] [facility] [address] [address]    Hours: 24-hours Phone: [phone]   · isosorbide mononitrate 30 MG 24 hr tablet         SOCIAL HISTORY:     Social History     Socioeconomic History    Marital status:      Spouse name: Not on file    Number of children: Not on file    Years of education: Not on file    Highest education level: Not on file   Occupational History    Not on file   Social Needs    Financial resource strain: Not hard at all    Food insecurity     Worry: Never true     Inability: Never true    Transportation needs     Medical: No     Non-medical: No   Tobacco Use    Smoking status: Never Smoker    Smokeless tobacco: Never Used   Substance and Sexual Activity    Alcohol use: No     Frequency: Never     Drinks per session: 1 or 2     Binge frequency: Never     Drug use: No    Sexual activity: Yes     Partners: Female   Lifestyle    Physical activity     Days per week: 1 day     Minutes per session: 60 min    Stress: Only a little   Relationships    Social connections     Talks on phone: More than three times a week     Gets together: Twice a week     Attends [employer] service: Not on file     Active member of club or organization: Yes     Attends meetings of clubs or organizations: More than 4 times per year     Relationship status:    Other Topics Concern    Not on file   Social History Narrative    Not on file       FAMILY HISTORY:     Family History   Problem Relation Age of Onset    Depression Mother     Heart disease Mother     Stroke Father     No Known Problems Sister     Blindness Brother     Diabetes Sister     No Known Problems Sister     No Known Problems Maternal Aunt     No Known Problems Maternal Uncle     No Known Problems Paternal Aunt     No Known Problems Paternal Uncle     No Known Problems Maternal Grandmother     No Known Problems Maternal Grandfather     No Known Problems Paternal Grandmother     No Known Problems Paternal Grandfather     Amblyopia Neg Hx     Cancer Neg Hx     Cataracts Neg Hx     Glaucoma Neg Hx     Hypertension Neg Hx     Macular degeneration Neg Hx     Retinal detachment Neg Hx     Strabismus Neg Hx     Thyroid disease Neg Hx        REVIEW OF SYSTEMS:   Review of Systems   Respiratory: Negative for sputum production, shortness of breath and wheezing.    Cardiovascular: Positive for chest pain. Negative for palpitations, orthopnea, claudication, leg swelling and PND.   Gastrointestinal: Negative for abdominal pain, heartburn, nausea and vomiting.   Neurological: Negative for dizziness, speech change, focal weakness, loss of consciousness, weakness and headaches.       PHYSICAL EXAM:     Vitals:    12/01/20 1029   BP: (!) 151/70   Pulse: 61   Resp: 16    Body mass index is 31.04 kg/m².  Weight:  "89.9 kg (198 lb 3.1 oz)   Height: 5' 7" (170.2 cm)     Physical Exam  Vitals signs reviewed.   Constitutional:       General: He is not in acute distress.     Appearance: He is well-developed and overweight. He is not diaphoretic.   Neck:      Vascular: No carotid bruit or JVD.   Cardiovascular:      Rate and Rhythm: Normal rate and regular rhythm.      Pulses: Normal pulses.      Heart sounds: Normal heart sounds.   Pulmonary:      Effort: Pulmonary effort is normal.      Breath sounds: Normal breath sounds.   Abdominal:      General: Bowel sounds are normal.      Palpations: Abdomen is soft.      Tenderness: There is no abdominal tenderness.   Musculoskeletal:      Right lower leg: No edema.      Left lower leg: No edema.   Neurological:      Mental Status: He is alert and oriented to person, place, and time.   Psychiatric:         Speech: Speech normal.         Behavior: Behavior normal.         DATA:   EKG: (personally reviewed tracing)  11/05/2020-atrial flutter with slow ventricular response.  Laboratory:  CBC:  Recent Labs   Lab 11/26/18  0845 08/18/20  0928 11/05/20  1305   WBC 5.20 7.02 7.99   Hemoglobin 13.2 L 11.5 L 12.2 L   Hematocrit 43.0 39.7 L 39.2 L   Platelets 204 227 248       CHEMISTRIES:  Recent Labs   Lab 06/12/19  0944 08/18/20  0928 11/05/20  1305   Glucose 150 H 147 H 112 H   Sodium 140 140 141   Potassium 4.8 5.1 4.6   BUN 21 27 H 26 H   Creatinine 1.3 1.2 1.2   eGFR if  >60.0 >60.0 >60   eGFR if non African American 55.7 A >60.0 >60   Calcium 9.7 9.8 11.0 H       CARDIAC BIOMARKERS:        COAGS:  Recent Labs   Lab 11/05/20  1305   INR 1.0       LIPIDS/LFTS:  Recent Labs   Lab 02/22/19  1546 03/26/19  0805 05/15/19  0828 08/29/19 08/18/20  0928   Cholesterol  --  179 139  --  137   Triglycerides  --  149 458 H 188 H 125   HDL  --  34 L 23 L 27 L 25 L   LDL Cholesterol  --  115.2 Invalid, Trig>400.0 72 87.0   Non-HDL Cholesterol  --  145 116 100 112   AST 26  --  23  --  18 "   ALT 33  --  28  --  18       Cardiovascular Testin/11/20 C - EDP 19, LAD subtotal mid - distal vessel diffusely diseased, Cx - mid long 80% between OM1 and OM2, OM1 90% mid, OM2 80% mid, RCA 80% mid. SVG to D1 patent with good runoff, SVG to PDA patent with good runoff. LIMA to LAD - mid to distal graft with diffuse 80-90% - Distal LAD diffusely diseased     Will review with interventional staff for possible staged PCI of Cx/OM1/OM2      PET stress: 7-17  CONCLUSIONS: NORMAL MYOCARDIAL PERFUSION PET STRESS TEST  1. The perfusion scan is free of evidence for myocardial ischemia or injury.   2. Resting wall motion is physiologic. Stress wall motion is physiologic.   3. LV function is normal at rest and stress.  (normal is >= 51%)  4. The ventricular volumes are normal at rest and stress.   5. The extracardiac distribution of radioactivity is normal.   6. There was no previous study available to compare.      Stress test 3/18/20    Probably normal joseph myocardial perfusion study.    There is a mild intensity fixed defect in the mid anterior wall of the left ventricle, secondary to attenuation.    Gated perfusion images showed an ejection fraction of 64% post stress.    There is normal wall motion post stress.     Echo 3/18/20  · Normal left ventricular systolic function. The estimated ejection fraction is 60%.  · Concentric left ventricular remodeling.  · No wall motion abnormalities.  · Normal right ventricular systolic function.    ASSESSMENT:     1. Coronary artery disease/Hx CABG:  Recent angina episodes  2. Hypertension:  Elevated  3. Hyperlipidemia:  LDL 87  4. Atrial flutter  5. Obesity    PLAN:     1. Coronary artery disease/Hx CABG/angina:  Discussed percutaneous coronary intervention.  Patient is amenable.  Plan is to intervene upon OM1 and left circumflex possibly into the 2nd obtuse marginal.  EBU 3.5 guide.  Right radial access.  Risks, benefits and alternatives of the catheterization  procedure were discussed with the patient.The risks of coronary angiography include but are not limited to: bleeding, infection, death, heart attack, arrhythmia, kidney injury or failure, potential need for dialysis, allergic reactions, stroke, need for emergency surgery, hematoma, pseudoaneurysm etc.  Should stenting be indicated, the patient has agreed to dual anti-platelet therapy for 1-consecutive year with a drug-eluting stent and a minimum of 1-month with the use of a bare metal stent. Additionally, pt is aware that non-compliance is likely to result in stent clotting with heart attack, heart failure, and/or death  The risks of moderate sedation include hypotension, respiratory depression, arrhythmias, bronchospasm, and death. Informed consent was obtained and the  patient is agreeable to proceed with the procedure. Consent was placed on the chart.  2. Hypertension:  Add Imdur 30 mg p.o. q.d..  3. Hyperlipidemia:  Continue current management  4.  Atrial flutter:  Follow-up EKG  5. Return to clinic postprocedure      Bob Nogueira MD, MPH, FACC, [facility]

## 2020-12-04 DIAGNOSIS — E78.5 HYPERLIPIDEMIA ASSOCIATED WITH TYPE 2 DIABETES MELLITUS: ICD-10-CM

## 2020-12-04 DIAGNOSIS — E11.69 HYPERLIPIDEMIA ASSOCIATED WITH TYPE 2 DIABETES MELLITUS: ICD-10-CM

## 2020-12-05 RX ORDER — ERGOCALCIFEROL 1.25 MG/1
CAPSULE ORAL
Qty: 12 CAPSULE | Refills: 0 | Status: CANCELLED | OUTPATIENT
Start: 2020-12-05

## 2020-12-07 ENCOUNTER — HOSPITAL ENCOUNTER (OUTPATIENT)
Dept: PREADMISSION TESTING | Facility: HOSPITAL | Age: 70
Discharge: HOME | End: 2020-12-07
Attending: INTERNAL MEDICINE
Payer: MEDICARE

## 2020-12-07 VITALS
OXYGEN SATURATION: 99 % | HEIGHT: 67 IN | SYSTOLIC BLOOD PRESSURE: 158 MMHG | RESPIRATION RATE: 17 BRPM | WEIGHT: 199.94 LBS | TEMPERATURE: 99 F | HEART RATE: 55 BPM | BODY MASS INDEX: 31.38 KG/M2 | DIASTOLIC BLOOD PRESSURE: 67 MMHG

## 2020-12-07 DIAGNOSIS — Z79.4 DIABETES MELLITUS DUE TO UNDERLYING CONDITION WITH HYPEROSMOLARITY WITHOUT COMA, WITH LONG-TERM CURRENT USE OF INSULIN: ICD-10-CM

## 2020-12-07 DIAGNOSIS — I25.119 CORONARY ARTERY DISEASE INVOLVING NATIVE CORONARY ARTERY OF NATIVE HEART WITH ANGINA PECTORIS: ICD-10-CM

## 2020-12-07 DIAGNOSIS — E08.00 DIABETES MELLITUS DUE TO UNDERLYING CONDITION WITH HYPEROSMOLARITY WITHOUT COMA, WITH LONG-TERM CURRENT USE OF INSULIN: ICD-10-CM

## 2020-12-07 DIAGNOSIS — Z01.818 PRE-OP TESTING: Primary | ICD-10-CM

## 2020-12-07 LAB
ANION GAP SERPL CALC-SCNC: 7 MMOL/L (ref 8–16)
APTT BLDCRRT: 25.9 SEC (ref 21–32)
BASOPHILS # BLD AUTO: 0.03 K/UL (ref 0–0.2)
BASOPHILS NFR BLD: 0.5 % (ref 0–1.9)
BUN SERPL-MCNC: 21 MG/DL (ref 8–23)
CALCIUM SERPL-MCNC: 9.3 MG/DL (ref 8.7–10.5)
CHLORIDE SERPL-SCNC: 112 MMOL/L (ref 95–110)
CO2 SERPL-SCNC: 23 MMOL/L (ref 23–29)
CREAT SERPL-MCNC: 1.2 MG/DL (ref 0.5–1.4)
DIFFERENTIAL METHOD: ABNORMAL
EOSINOPHIL # BLD AUTO: 0.2 K/UL (ref 0–0.5)
EOSINOPHIL NFR BLD: 3.4 % (ref 0–8)
ERYTHROCYTE [DISTWIDTH] IN BLOOD BY AUTOMATED COUNT: 18.3 % (ref 11.5–14.5)
EST. GFR  (AFRICAN AMERICAN): >60 ML/MIN/1.73 M^2
EST. GFR  (NON AFRICAN AMERICAN): >60 ML/MIN/1.73 M^2
GLUCOSE SERPL-MCNC: 102 MG/DL (ref 70–110)
HCT VFR BLD AUTO: 33.8 % (ref 40–54)
HGB BLD-MCNC: 10.6 G/DL (ref 14–18)
IMM GRANULOCYTES # BLD AUTO: 0.04 K/UL (ref 0–0.04)
IMM GRANULOCYTES NFR BLD AUTO: 0.7 % (ref 0–0.5)
INR PPP: 1 (ref 0.8–1.2)
LYMPHOCYTES # BLD AUTO: 1.6 K/UL (ref 1–4.8)
LYMPHOCYTES NFR BLD: 27.8 % (ref 18–48)
MCH RBC QN AUTO: 24.5 PG (ref 27–31)
MCHC RBC AUTO-ENTMCNC: 31.4 G/DL (ref 32–36)
MCV RBC AUTO: 78 FL (ref 82–98)
MONOCYTES # BLD AUTO: 0.4 K/UL (ref 0.3–1)
MONOCYTES NFR BLD: 7.6 % (ref 4–15)
NEUTROPHILS # BLD AUTO: 3.5 K/UL (ref 1.8–7.7)
NEUTROPHILS NFR BLD: 60 % (ref 38–73)
NRBC BLD-RTO: 0 /100 WBC
PLATELET # BLD AUTO: 231 K/UL (ref 150–350)
PMV BLD AUTO: 8.9 FL (ref 9.2–12.9)
POTASSIUM SERPL-SCNC: 5.1 MMOL/L (ref 3.5–5.1)
PROTHROMBIN TIME: 11.2 SEC (ref 9–12.5)
RBC # BLD AUTO: 4.33 M/UL (ref 4.6–6.2)
SARS-COV-2 RDRP RESP QL NAA+PROBE: NEGATIVE
SODIUM SERPL-SCNC: 142 MMOL/L (ref 136–145)
WBC # BLD AUTO: 5.82 K/UL (ref 3.9–12.7)

## 2020-12-07 PROCEDURE — 85730 THROMBOPLASTIN TIME PARTIAL: CPT | Mod: HCNC

## 2020-12-07 PROCEDURE — 80048 BASIC METABOLIC PNL TOTAL CA: CPT | Mod: HCNC

## 2020-12-07 PROCEDURE — U0002 COVID-19 LAB TEST NON-CDC: HCPCS | Mod: HCNC

## 2020-12-07 PROCEDURE — 85610 PROTHROMBIN TIME: CPT | Mod: HCNC

## 2020-12-07 PROCEDURE — 85025 COMPLETE CBC W/AUTO DIFF WBC: CPT | Mod: HCNC

## 2020-12-07 RX ORDER — METFORMIN HYDROCHLORIDE 500 MG/1
TABLET, EXTENDED RELEASE ORAL
Qty: 360 TABLET | Refills: 0 | Status: SHIPPED | OUTPATIENT
Start: 2020-12-07 | End: 2021-02-03 | Stop reason: SDUPTHER

## 2020-12-07 RX ORDER — MULTIVIT WITH MINERALS/HERBS
1 TABLET ORAL DAILY
COMMUNITY

## 2020-12-07 RX ORDER — ERGOCALCIFEROL 1.25 MG/1
CAPSULE ORAL
Qty: 12 CAPSULE | Refills: 0 | Status: SHIPPED | OUTPATIENT
Start: 2020-12-07 | End: 2020-12-15

## 2020-12-07 NOTE — DISCHARGE INSTRUCTIONS
Your angiogram is scheduled for__THURSDAY  DEC  10,  2020_____________    Call [phone] between 2pm and 5pm on __Wednesday  DEC 9,  2020__________to find out your arrival time for the day of your procedure.    IF YOU ARRIVE BEFORE 7:00AM  REPORT TO EMERGENCY ROOM    IF YOU ARRIVE AFTER  7:00AM ENTER  IN THE FRONT OF THE HOSPITAL  Report to Same Day Surgery Unit on the 2nd floor of the hospital.     IMPORTANT INSTRUCTIONS:  Do not eat or drink anything after midnight.  This includes water and coffee.  It is okay to brush your teeth.  Do not chew gum or have hard candy or mints.    Take your morning medications as instructed with small sips of water.   SEE MEDICATION LIST    Do not take any diabetic medication.  Metformin or Synjardy should be held for 2 days before the angiogram.    Shower the night before your procedure and the morning of your procedure with Hibiclens as directed.       You may wear deodorant, but do not wear body lotion, powder or perfume/cologne       Do not wear jewelry.     You do not need money or valuables.  You may bring your cell phone.     If you are going home the same day, you must arrange for someone to drive you home.     Wear comfortable, loose fitting clothes.      Call your doctor if you have sickness or fever before your angiogram.     Our number in [facility] is [phone] if you need to contact us.     If you are going home the same day, you must arrange for a family member or a friend to drive you home.  Public transportation is prohibited.

## 2020-12-10 ENCOUNTER — PES CALL (OUTPATIENT)
Dept: ADMINISTRATIVE | Facility: CLINIC | Age: 70
End: 2020-12-10

## 2020-12-10 ENCOUNTER — HOSPITAL ENCOUNTER (OUTPATIENT)
Facility: HOSPITAL | Age: 70
Discharge: HOME | End: 2020-12-10
Attending: INTERNAL MEDICINE | Admitting: INTERNAL MEDICINE
Payer: MEDICARE

## 2020-12-10 VITALS
DIASTOLIC BLOOD PRESSURE: 69 MMHG | HEART RATE: 51 BPM | RESPIRATION RATE: 20 BRPM | SYSTOLIC BLOOD PRESSURE: 147 MMHG | WEIGHT: 199.94 LBS | TEMPERATURE: 98 F | OXYGEN SATURATION: 99 % | BODY MASS INDEX: 31.32 KG/M2

## 2020-12-10 DIAGNOSIS — Z01.818 PRE-OP TESTING: ICD-10-CM

## 2020-12-10 DIAGNOSIS — I25.119 CORONARY ARTERY DISEASE INVOLVING NATIVE CORONARY ARTERY OF NATIVE HEART WITH ANGINA PECTORIS: Primary | ICD-10-CM

## 2020-12-10 LAB
ALLENS TEST: ABNORMAL
POC ACTIVATED CLOTTING TIME K: 213 SEC (ref 74–137)
POCT GLUCOSE: 143 MG/DL (ref 70–110)
SAMPLE: ABNORMAL
SITE: ABNORMAL

## 2020-12-10 PROCEDURE — 99152 MOD SED SAME PHYS/QHP 5/>YRS: CPT | Mod: HCNC,,, | Performed by: INTERNAL MEDICINE

## 2020-12-10 PROCEDURE — C1725 CATH, TRANSLUMIN NON-LASER: HCPCS | Mod: HCNC | Performed by: INTERNAL MEDICINE

## 2020-12-10 PROCEDURE — 93458 L HRT ARTERY/VENTRICLE ANGIO: CPT | Mod: 26,59,51,HCNC | Performed by: INTERNAL MEDICINE

## 2020-12-10 PROCEDURE — 93458 PR CATH PLACE/CORON ANGIO, IMG SUPER/INTERP,W LEFT HEART VENTRICULOGRAPHY: ICD-10-PCS | Mod: 26,59,51,HCNC | Performed by: INTERNAL MEDICINE

## 2020-12-10 PROCEDURE — 92928 PR STENT: ICD-10-PCS | Mod: LC,HCNC,, | Performed by: INTERNAL MEDICINE

## 2020-12-10 PROCEDURE — 85347 COAGULATION TIME ACTIVATED: CPT | Mod: HCNC | Performed by: INTERNAL MEDICINE

## 2020-12-10 PROCEDURE — C9600 PERC DRUG-EL COR STENT SING: HCPCS | Mod: LC,HCNC | Performed by: INTERNAL MEDICINE

## 2020-12-10 PROCEDURE — C1874 STENT, COATED/COV W/DEL SYS: HCPCS | Mod: HCNC | Performed by: INTERNAL MEDICINE

## 2020-12-10 PROCEDURE — C1894 INTRO/SHEATH, NON-LASER: HCPCS | Mod: HCNC | Performed by: INTERNAL MEDICINE

## 2020-12-10 PROCEDURE — 25000003 PHARM REV CODE 250: Mod: HCNC | Performed by: INTERNAL MEDICINE

## 2020-12-10 PROCEDURE — 25500020 PHARM REV CODE 255: Mod: HCNC | Performed by: INTERNAL MEDICINE

## 2020-12-10 PROCEDURE — 99152 PR MOD CONSCIOUS SEDATION, SAME PHYS, 5+ YRS, FIRST 15 MIN: ICD-10-PCS | Mod: HCNC,,, | Performed by: INTERNAL MEDICINE

## 2020-12-10 PROCEDURE — 27201423 OPTIME MED/SURG SUP & DEVICES STERILE SUPPLY: Mod: HCNC | Performed by: INTERNAL MEDICINE

## 2020-12-10 PROCEDURE — C1887 CATHETER, GUIDING: HCPCS | Mod: HCNC | Performed by: INTERNAL MEDICINE

## 2020-12-10 PROCEDURE — 82962 GLUCOSE BLOOD TEST: CPT | Mod: HCNC | Performed by: INTERNAL MEDICINE

## 2020-12-10 PROCEDURE — 93458 L HRT ARTERY/VENTRICLE ANGIO: CPT | Mod: HCNC,59 | Performed by: INTERNAL MEDICINE

## 2020-12-10 PROCEDURE — 63600175 PHARM REV CODE 636 W HCPCS: Mod: HCNC | Performed by: INTERNAL MEDICINE

## 2020-12-10 PROCEDURE — 92928 PRQ TCAT PLMT NTRAC ST 1 LES: CPT | Mod: LC,HCNC,, | Performed by: INTERNAL MEDICINE

## 2020-12-10 PROCEDURE — C1769 GUIDE WIRE: HCPCS | Mod: HCNC | Performed by: INTERNAL MEDICINE

## 2020-12-10 DEVICE — STENT RONYX22526UX RESOLUTE ONYX 2.25X26
Type: IMPLANTABLE DEVICE | Site: CORONARY | Status: FUNCTIONAL
Brand: RESOLUTE ONYX™

## 2020-12-10 RX ORDER — ACETAMINOPHEN 325 MG/1
650 TABLET ORAL EVERY 4 HOURS PRN
Status: DISCONTINUED | OUTPATIENT
Start: 2020-12-10 | End: 2020-12-10 | Stop reason: HOSPADM

## 2020-12-10 RX ORDER — NAPROXEN SODIUM 220 MG/1
81 TABLET, FILM COATED ORAL DAILY
Qty: 90 TABLET | Refills: 3 | Status: SHIPPED | OUTPATIENT
Start: 2020-12-10 | End: 2021-01-06

## 2020-12-10 RX ORDER — MIDAZOLAM HYDROCHLORIDE 1 MG/ML
INJECTION INTRAMUSCULAR; INTRAVENOUS
Status: DISCONTINUED | OUTPATIENT
Start: 2020-12-10 | End: 2020-12-10 | Stop reason: HOSPADM

## 2020-12-10 RX ORDER — CLOPIDOGREL 300 MG/1
TABLET, FILM COATED ORAL
Status: DISCONTINUED | OUTPATIENT
Start: 2020-12-10 | End: 2020-12-10 | Stop reason: HOSPADM

## 2020-12-10 RX ORDER — LIDOCAINE HYDROCHLORIDE 10 MG/ML
INJECTION, SOLUTION EPIDURAL; INFILTRATION; INTRACAUDAL; PERINEURAL
Status: DISCONTINUED | OUTPATIENT
Start: 2020-12-10 | End: 2020-12-10 | Stop reason: HOSPADM

## 2020-12-10 RX ORDER — VERAPAMIL HYDROCHLORIDE 2.5 MG/ML
INJECTION, SOLUTION INTRAVENOUS
Status: DISCONTINUED | OUTPATIENT
Start: 2020-12-10 | End: 2020-12-10 | Stop reason: HOSPADM

## 2020-12-10 RX ORDER — FENTANYL CITRATE 50 UG/ML
INJECTION, SOLUTION INTRAMUSCULAR; INTRAVENOUS
Status: DISCONTINUED | OUTPATIENT
Start: 2020-12-10 | End: 2020-12-10 | Stop reason: HOSPADM

## 2020-12-10 RX ORDER — HEPARIN SODIUM 1000 [USP'U]/ML
INJECTION, SOLUTION INTRAVENOUS; SUBCUTANEOUS
Status: DISCONTINUED | OUTPATIENT
Start: 2020-12-10 | End: 2020-12-10 | Stop reason: HOSPADM

## 2020-12-10 RX ORDER — NITROGLYCERIN 20 MG/100ML
INJECTION INTRAVENOUS
Status: DISCONTINUED | OUTPATIENT
Start: 2020-12-10 | End: 2020-12-10 | Stop reason: HOSPADM

## 2020-12-10 RX ORDER — SODIUM CHLORIDE 9 MG/ML
INJECTION, SOLUTION INTRAVENOUS ONCE
Status: COMPLETED | OUTPATIENT
Start: 2020-12-10 | End: 2020-12-10

## 2020-12-10 RX ORDER — ONDANSETRON 8 MG/1
8 TABLET, ORALLY DISINTEGRATING ORAL EVERY 8 HOURS PRN
Status: DISCONTINUED | OUTPATIENT
Start: 2020-12-10 | End: 2020-12-10 | Stop reason: HOSPADM

## 2020-12-10 RX ADMIN — SODIUM CHLORIDE 75 ML/HR: 0.9 INJECTION, SOLUTION INTRAVENOUS at 07:12

## 2020-12-10 NOTE — NURSING
Patient arrived to floor in stable condition from cath lab. Visualized patient and assessed patient's overall condition and appearance. No complaints. NAD noted. Frequent checks and VS in progress. Right wrist Vas band in place, site, clean, dry, intact, no redness, swelling or hematoma. Will continue to monitor

## 2020-12-10 NOTE — PLAN OF CARE
12/10/20 1349   Final Note   Assessment Type Final Discharge Note   Anticipated Discharge Disposition Home   What phone number can be called within the next 1-3 days to see how you are doing after discharge? [phone]   Hospital Follow Up  Appt(s) scheduled? Yes   Discharge plans and expectations educations in teach back method with documentation complete? Yes   Right Care Referral Info   Post Acute Recommendation No Care   Post-Acute Status   Post-Acute Authorization Other  (Home)   Other Status No Post-Acute Service Needs   Discharge Delays None known at this time     EDUCATION:  Pt provided with educational information on Post Op.  I Information included:  signs and symptoms to look for at discharge. JAY instructed pt to call the doctor if experiencing symptoms that may indicate a medical emergency: CALL 911 if signs and symptoms worsen. Reminded pt things he will be responsible for to manage his healthcare at home: getting Rx filled, attending follow up appointments, and taking medication as prescribed were discussed.   Teach back method used.  All questions answered.  Patient verbalized understanding of all information.       JAY discussed appointments times and dates.     AJY notified pt's nurse, Joyce, all CM needs have been met.

## 2020-12-10 NOTE — DISCHARGE SUMMARY
[facility]  Discharge Note  Short Stay    Procedure(s) (LRB):  Stent, Drug Eluting, Single Vessel, Coronary (Left)  Percutaneous coronary intervention (N/A)    HPI:      Driss Hernandez was referred by Dr. Cuellar for evaluation for PCI.   He was having episodes of chest discomfort.  Decreased exercise capacity.  Difficulty performing ADLs.  Secondary to symptoms Dr. Cuellar performed coronary angiography.[facility] from 11/11/20. reviewed.  Angiography was reviewed and discussed at length. Patient presents for angiography/intervention.  Patient underwent PCI this am. S/p 2.25/26mm RICO reducing mid 90% OM2 lesion to 0%. INGRIS 3 flow.  Patient noted to be in atrial fibrillation/flutter.  Rate controlled.  No prior history.  Will add Eliquis 5 mg p.o. b.i.d..       OUTCOME: Patient tolerated treatment/procedure well without complication and is now ready for discharge.    DISPOSITION: Home or Self Care    FINAL DIAGNOSIS:  Coronary artery disease involving native coronary artery of native heart with angina pectoris    FOLLOWUP: In clinic    DISCHARGE INSTRUCTIONS:    Discharge Procedure Orders   Call MD for:  temperature >100.4     Call MD for:  persistent nausea and vomiting     Call MD for:  difficulty breathing, headache or visual disturbances     Call MD for:  redness, tenderness, or signs of infection (pain, swelling, redness, odor or green/yellow discharge around incision site)     Call MD for:  persistent dizziness or light-headedness     Call MD for:  severe uncontrolled pain     Remove dressing in 24 hours     Cardiac rehab phase II   Standing Status: Future Standing Exp. Date: 12/10/21     Order Specific Question Answer Comments   Department External - Other    Select qualifying diagnosis: Z98.61 - Coronary angioplasty status      Activity as tolerated

## 2020-12-10 NOTE — PLAN OF CARE
12/10/20 1344   Discharge Assessment   Assessment Type Discharge Planning Assessment   Confirmed/corrected address and phone number on facesheet? Yes   Assessment information obtained from? Patient   Communicated expected length of stay with patient/caregiver yes   Prior to hospitilization cognitive status: Alert/Oriented   Prior to hospitalization functional status: Independent   Current cognitive status: Alert/Oriented   Current Functional Status: Independent   Facility Arrived From: Home   Lives With spouse   Able to Return to Prior Arrangements no   Is patient able to care for self after discharge? Yes   Who are your caregiver(s) and their phone number(s)? Ada Spouse [phone]   Patient's perception of discharge disposition home or selfcare   Readmission Within the Last 30 Days no previous admission in last 30 days   Patient currently being followed by outpatient case management? No   Patient currently receives any other outside agency services? No   Equipment Currently Used at Home none   Do you have any problems affording any of your prescribed medications? No   Is the patient taking medications as prescribed? yes   Does the patient have transportation home? Yes   Transportation Anticipated family or friend will provide   Dialysis Name and Scheduled days N/A   Does the patient receive services at the Coumadin Clinic? No   Discharge Plan A Home with family   DME Needed Upon Discharge  none   Patient/Family in Agreement with Plan yes

## 2020-12-10 NOTE — PROGRESS NOTES
WRITTEN HEALTHCARE DISCHARGE INFORMATION     Things that YOU are RESPONSIBLE for to Manage Your Care At Home:    1. Getting your prescriptions filled.  2. Taking you medications as directed. DO NOT MISS ANY DOSES!  3. Going to your follow-up doctor appointments. This is important because it allows the doctor to monitor your progress and to determine if any changes need to be made to your treatment plan.    If you are unable to make your follow up appointments, please call the number listed and reschedule this appointment.     ____________HELP AT HOME____________________    Experiencing any SIGNS or SYMPTOMS: YOU CAN    Schedule a same day appopintment with your Primary Care Doctor or  you can call [facility] On Call Nurse Care Line for 24/7 assistance at [phone]    If you are experience any signs or symptoms that have become severe, Call 911 and come to your nearest Emergency Room.    Thank you for choosing [facility] and allowing us to care for you.   From your care management team: Daria HIGH MSEMILIA [phone]    You should receive a call from [facility] Discharge Department within 48-72 hours to help manage your care after discharge. Please try to make sure that you answer your phone for this important phone call.  Follow-up Information     Bob Nogueira MD On 12/16/2020.    Specialties: Cardiovascular Disease, INTERVENTIONAL CARDIOLOGY, Cardiology  Why: Outpatient Services, follow-up appointment @ 10:20am   Contact information:  [address]  [address]  [facility] [address]  [phone]

## 2020-12-10 NOTE — NURSING
Patient complained of left-sided chest discomfort, 4/10. MD notified, no new orders at this time, Will Continue to monitor. Post Cath monitoring in progress.

## 2020-12-10 NOTE — INTERVAL H&P NOTE
The patient has been examined and the H&P has been reviewed:    I concur with the findings and no changes have occurred since H&P was written.    Anesthesia/Surgery risks, benefits and alternative options discussed and understood by patient/family.          Active Hospital Problems    Diagnosis  POA    Coronary artery disease involving native coronary artery of native heart [I25.10]  Yes      Resolved Hospital Problems   No resolved problems to display.

## 2020-12-11 ENCOUNTER — PATIENT MESSAGE (OUTPATIENT)
Dept: OTHER | Facility: OTHER | Age: 70
End: 2020-12-11

## 2020-12-11 ENCOUNTER — TELEPHONE (OUTPATIENT)
Dept: CARDIOLOGY | Facility: CLINIC | Age: 70
End: 2020-12-11

## 2020-12-11 NOTE — TELEPHONE ENCOUNTER
Called pt he didn't answer    ----- Message from Celena Salazar sent at 12/11/2020  3:16 PM CST -----  Contact: Patient [phone]  Type: Patient Call Back    Who called: Patient     What is the request in detail: Need to speak to nurse about his medications, Eliquis and Plavix. Discharge paperwork states to take Eliquis. Pharmacy asked if he was told to stop the Plavix. Pt was given Eliquis yesterday, while in the hospital, after getting stents placed. Patient states that he should not be on two blood thinners. Need to know what to do. Patient states it took him 2 days to get the Eliquis and 6 hours worth of phone calls. Please call pt ASAP! Left a message earlier and is still waiting on a call back.  Pt states that Dr Nogueira was worried about pt not taking the medication and getting a blood clot.... Please call ASAP!!    Would the patient rather a call back or a response via [facility]? Call back    Best call back number: [phone]

## 2020-12-11 NOTE — NURSING
Patients wife ([phone]) called to inform that trial for eliquis provided upon discharge is not correct and is requesting to speak to  regarding trial. Called joanne to inform, no answer left message.

## 2020-12-15 RX ORDER — ERGOCALCIFEROL 1.25 MG/1
CAPSULE ORAL
Qty: 12 CAPSULE | Refills: 0 | Status: SHIPPED | OUTPATIENT
Start: 2020-12-15 | End: 2021-01-23 | Stop reason: SDUPTHER

## 2020-12-15 NOTE — TELEPHONE ENCOUNTER
Spoke with the pt and informed him that the rx was actually sent to [facility] on 12/7.   It has been sent again today. The pt understood.

## 2020-12-15 NOTE — TELEPHONE ENCOUNTER
----- Message from Ivis Coates MA sent at 12/15/2020  2:27 PM CST -----    ----- Message -----  From: Zakiya Varela  Sent: 12/15/2020   2:25 PM CST  To: Casandra Lima Staff    Type: Patient Call Back    Who called: Wife     What is the request in detail:  patient 's wife would like to know why his vitamin D was denied. Please call     Can the clinic reply by [facility]? No     Would the patient rather a call back or a response via [facility]?  Call     Best call back number:.[phone]

## 2020-12-15 NOTE — TELEPHONE ENCOUNTER
Please let pt know rx was actually sent to [facility] 12/7.   It has been sent again.

## 2020-12-16 ENCOUNTER — TELEPHONE (OUTPATIENT)
Dept: CARDIAC REHAB | Facility: CLINIC | Age: 70
End: 2020-12-16

## 2020-12-16 ENCOUNTER — OFFICE VISIT (OUTPATIENT)
Dept: CARDIOLOGY | Facility: CLINIC | Age: 70
End: 2020-12-16
Payer: MEDICARE

## 2020-12-16 VITALS
SYSTOLIC BLOOD PRESSURE: 155 MMHG | WEIGHT: 201.06 LBS | OXYGEN SATURATION: 97 % | HEIGHT: 67 IN | HEART RATE: 52 BPM | RESPIRATION RATE: 16 BRPM | BODY MASS INDEX: 31.56 KG/M2 | DIASTOLIC BLOOD PRESSURE: 65 MMHG

## 2020-12-16 DIAGNOSIS — E08.00 DIABETES MELLITUS DUE TO UNDERLYING CONDITION WITH HYPEROSMOLARITY WITHOUT COMA, WITH LONG-TERM CURRENT USE OF INSULIN: ICD-10-CM

## 2020-12-16 DIAGNOSIS — I25.119 CORONARY ARTERY DISEASE INVOLVING NATIVE CORONARY ARTERY OF NATIVE HEART WITH ANGINA PECTORIS: Primary | ICD-10-CM

## 2020-12-16 DIAGNOSIS — I48.92 ATRIAL FLUTTER, UNSPECIFIED TYPE: ICD-10-CM

## 2020-12-16 DIAGNOSIS — Z79.4 DIABETES MELLITUS DUE TO UNDERLYING CONDITION WITH HYPEROSMOLARITY WITHOUT COMA, WITH LONG-TERM CURRENT USE OF INSULIN: ICD-10-CM

## 2020-12-16 DIAGNOSIS — I10 ESSENTIAL HYPERTENSION: ICD-10-CM

## 2020-12-16 DIAGNOSIS — E78.5 HYPERLIPIDEMIA ASSOCIATED WITH TYPE 2 DIABETES MELLITUS: ICD-10-CM

## 2020-12-16 DIAGNOSIS — I48.91 ATRIAL FIBRILLATION WITH SLOW VENTRICULAR RESPONSE: ICD-10-CM

## 2020-12-16 DIAGNOSIS — I70.0 ABDOMINAL AORTIC ATHEROSCLEROSIS: ICD-10-CM

## 2020-12-16 DIAGNOSIS — E66.09 CLASS 1 OBESITY DUE TO EXCESS CALORIES WITH SERIOUS COMORBIDITY AND BODY MASS INDEX (BMI) OF 31.0 TO 31.9 IN ADULT: ICD-10-CM

## 2020-12-16 DIAGNOSIS — Z95.1 HX OF CABG: ICD-10-CM

## 2020-12-16 DIAGNOSIS — R42 DIZZINESS: ICD-10-CM

## 2020-12-16 DIAGNOSIS — Z79.01 CURRENT USE OF LONG TERM ANTICOAGULATION: ICD-10-CM

## 2020-12-16 DIAGNOSIS — E11.69 HYPERLIPIDEMIA ASSOCIATED WITH TYPE 2 DIABETES MELLITUS: ICD-10-CM

## 2020-12-16 PROCEDURE — 93000 EKG 12-LEAD: ICD-10-PCS | Mod: HCNC,S$GLB,, | Performed by: INTERNAL MEDICINE

## 2020-12-16 PROCEDURE — 1159F MED LIST DOCD IN RCRD: CPT | Mod: HCNC,S$GLB,, | Performed by: INTERNAL MEDICINE

## 2020-12-16 PROCEDURE — 3077F SYST BP >= 140 MM HG: CPT | Mod: HCNC,CPTII,S$GLB, | Performed by: INTERNAL MEDICINE

## 2020-12-16 PROCEDURE — 3077F PR MOST RECENT SYSTOLIC BLOOD PRESSURE >= 140 MM HG: ICD-10-PCS | Mod: HCNC,CPTII,S$GLB, | Performed by: INTERNAL MEDICINE

## 2020-12-16 PROCEDURE — 3078F DIAST BP <80 MM HG: CPT | Mod: HCNC,CPTII,S$GLB, | Performed by: INTERNAL MEDICINE

## 2020-12-16 PROCEDURE — 99999 PR PBB SHADOW E&M-EST. PATIENT-LVL V: CPT | Mod: PBBFAC,HCNC,, | Performed by: INTERNAL MEDICINE

## 2020-12-16 PROCEDURE — 3008F BODY MASS INDEX DOCD: CPT | Mod: HCNC,CPTII,S$GLB, | Performed by: INTERNAL MEDICINE

## 2020-12-16 PROCEDURE — 3078F PR MOST RECENT DIASTOLIC BLOOD PRESSURE < 80 MM HG: ICD-10-PCS | Mod: HCNC,CPTII,S$GLB, | Performed by: INTERNAL MEDICINE

## 2020-12-16 PROCEDURE — 1101F PR PT FALLS ASSESS DOC 0-1 FALLS W/OUT INJ PAST YR: ICD-10-PCS | Mod: HCNC,CPTII,S$GLB, | Performed by: INTERNAL MEDICINE

## 2020-12-16 PROCEDURE — 3052F PR MOST RECENT HEMOGLOBIN A1C LEVEL 8.0 - < 9.0%: ICD-10-PCS | Mod: HCNC,CPTII,S$GLB, | Performed by: INTERNAL MEDICINE

## 2020-12-16 PROCEDURE — 3072F LOW RISK FOR RETINOPATHY: CPT | Mod: HCNC,S$GLB,, | Performed by: INTERNAL MEDICINE

## 2020-12-16 PROCEDURE — 3072F PR LOW RISK FOR RETINOPATHY: ICD-10-PCS | Mod: HCNC,S$GLB,, | Performed by: INTERNAL MEDICINE

## 2020-12-16 PROCEDURE — 3288F PR FALLS RISK ASSESSMENT DOCUMENTED: ICD-10-PCS | Mod: HCNC,CPTII,S$GLB, | Performed by: INTERNAL MEDICINE

## 2020-12-16 PROCEDURE — 1101F PT FALLS ASSESS-DOCD LE1/YR: CPT | Mod: HCNC,CPTII,S$GLB, | Performed by: INTERNAL MEDICINE

## 2020-12-16 PROCEDURE — 99999 PR PBB SHADOW E&M-EST. PATIENT-LVL V: ICD-10-PCS | Mod: PBBFAC,HCNC,, | Performed by: INTERNAL MEDICINE

## 2020-12-16 PROCEDURE — 93000 ELECTROCARDIOGRAM COMPLETE: CPT | Mod: HCNC,S$GLB,, | Performed by: INTERNAL MEDICINE

## 2020-12-16 PROCEDURE — 3288F FALL RISK ASSESSMENT DOCD: CPT | Mod: HCNC,CPTII,S$GLB, | Performed by: INTERNAL MEDICINE

## 2020-12-16 PROCEDURE — 99214 PR OFFICE/OUTPT VISIT, EST, LEVL IV, 30-39 MIN: ICD-10-PCS | Mod: HCNC,S$GLB,, | Performed by: INTERNAL MEDICINE

## 2020-12-16 PROCEDURE — 3008F PR BODY MASS INDEX (BMI) DOCUMENTED: ICD-10-PCS | Mod: HCNC,CPTII,S$GLB, | Performed by: INTERNAL MEDICINE

## 2020-12-16 PROCEDURE — 1159F PR MEDICATION LIST DOCUMENTED IN MEDICAL RECORD: ICD-10-PCS | Mod: HCNC,S$GLB,, | Performed by: INTERNAL MEDICINE

## 2020-12-16 PROCEDURE — 1126F AMNT PAIN NOTED NONE PRSNT: CPT | Mod: HCNC,S$GLB,, | Performed by: INTERNAL MEDICINE

## 2020-12-16 PROCEDURE — 1126F PR PAIN SEVERITY QUANTIFIED, NO PAIN PRESENT: ICD-10-PCS | Mod: HCNC,S$GLB,, | Performed by: INTERNAL MEDICINE

## 2020-12-16 PROCEDURE — 3052F HG A1C>EQUAL 8.0%<EQUAL 9.0%: CPT | Mod: HCNC,CPTII,S$GLB, | Performed by: INTERNAL MEDICINE

## 2020-12-16 PROCEDURE — 99214 OFFICE O/P EST MOD 30 MIN: CPT | Mod: HCNC,S$GLB,, | Performed by: INTERNAL MEDICINE

## 2020-12-16 RX ORDER — METOPROLOL SUCCINATE 25 MG/1
25 TABLET, EXTENDED RELEASE ORAL DAILY
Qty: 90 TABLET | Refills: 3 | Status: SHIPPED | OUTPATIENT
Start: 2020-12-16 | End: 2021-05-10

## 2020-12-16 RX ORDER — ISOSORBIDE MONONITRATE 60 MG/1
60 TABLET, EXTENDED RELEASE ORAL DAILY
Qty: 90 TABLET | Refills: 3 | Status: SHIPPED | OUTPATIENT
Start: 2020-12-16 | End: 2021-12-03 | Stop reason: SDUPTHER

## 2020-12-16 NOTE — PROGRESS NOTES
CARDIOLOGY CLINIC VISIT        HISTORY OF PRESENT ILLNESS:     Driss Hernandez presents for continued care.  Status post percutaneous coronary intervention of a 90% mid OM2 lesion reduced to 0% following placement of a 2.25 x 26 mm drug-eluting stent.  He was referred by Dr. Cuellar for evaluation for PCI.   He was having episodes of chest discomfort.  Decreased exercise capacity.  Difficulty performing ADLs.  Since the procedure the patient states that he is feeling worse.  He was also noted to be in atrial fibrillation.  We started him on Eliquis on discharge.  His heart rate was controlled.  His blood pressure is elevated today.  Today he says he is getting better.  No chest discomfort.  Some occasional dizziness.  Blood pressure is elevated.  Consistent with his home values.    CARDIOVASCULAR HISTORY:     Percutaneous coronary intervention 12/10/2020:    1.  90% mid OM2 stenosis.  2.  Successful PCI with placement of a 2.25 x 26 mm drug-eluting stent reducing the 90% stenosis to 0%.  INGRIS 3 flow.  No dissection.  Good angiographic results.    CAD/CABG x 3 2016 [facility],     PAST MEDICAL HISTORY:     Past Medical History:   Diagnosis Date    Chronic midline low back pain without sciatica 10/2/2017    Coronary artery disease involving native coronary artery of native heart 3/5/2020    Coronary artery disease involving native coronary artery of native heart with angina pectoris 12/10/2020    Diabetes mellitus with neurological manifestations, uncontrolled 1/24/2017    Diabetic polyneuropathy associated with type 2 diabetes mellitus 1/24/2017    Diabetic polyneuropathy associated with type 2 diabetes mellitus     Essential hypertension 1/24/2017    Gastroesophageal reflux disease 1/24/2017    Hyperlipidemia 1/24/2017    Insomnia 1/24/2017    Long-term insulin use 1/24/2017    Lumbar spondylosis 11/13/2017    Nuclear sclerosis of both eyes 8/24/2017    Obesity     Stage 3 chronic kidney disease 2/13/2019     Uncontrolled type 2 diabetes mellitus without complication, with long-term current use of insulin 1/24/2017       PAST SURGICAL HISTORY:     Past Surgical History:   Procedure Laterality Date    BACK SURGERY      2002    CATARACT EXTRACTION W/  INTRAOCULAR LENS IMPLANT Right 08/29/2017    Dr. Hong    COLONOSCOPY N/A 2/21/2017    Procedure: COLONOSCOPY;  Surgeon: Robb Rosado MD;  Location: [facility] ENDO;  Service: Endoscopy;  Laterality: N/A;    CORONARY ANGIOGRAPHY INCLUDING BYPASS GRAFTS WITH CATHETERIZATION OF LEFT HEART N/A 11/11/2020    Procedure: ANGIOGRAM, CORONARY, INCLUDING BYPASS GRAFT, WITH LEFT HEART CATHETERIZATION;  Surgeon: Lane Cuellar MD;  Location: [facility] CATH LAB;  Service: Cardiology;  Laterality: N/A;  RN PRE OP 11-5-2020. --COVID NEGATIVE ON  11-. C A    CORONARY ARTERY BYPASS GRAFT      March 2016    EPIDURAL STEROID INJECTION Bilateral 11/14/2018    Procedure: Lumbar Medial Branch Blocks;  Surgeon: Eze Byrd Jr., MD;  Location: [facility] ENDO;  Service: Pain Management;  Laterality: Bilateral;  Bilateral Lumbar Medial Branch Blocks L2, L3, L4, L5    15238  85222    Arrive @ 1150; NO Sedation    EPIDURAL STEROID INJECTION Bilateral 11/28/2018    Procedure: Injection, Steroid, Epidural;  Surgeon: Eze Byrd Jr., MD;  Location: [facility] ENDO;  Service: Pain Management;  Laterality: Bilateral;  Bilateral Sacroiliac Joint Steroid Injections    19189    Arrive @ 0910    EPIDURAL STEROID INJECTION Bilateral 3/20/2019    Procedure: Injection, Steroid, Sacroiliiac Joint;  Surgeon: Eze Byrd Jr., MD;  Location[facility];  Service: Pain Management;  Laterality: Bilateral;  Bilateral Sacroiliac Joint Steroid Injections    27808    Arrive @ 1145; Trigger point injections also?    TONSILLECTOMY         ALLERGIES AND MEDICATION:     Review of patient's allergies indicates:   Allergen Reactions    Penicillins Other (See Comments)     Unknown reaction, Had a  "reaction as a child    Shrimp Itching     Hand itching        Medication List          Accurate as of December 16, 2020 11:03 AM. If you have any questions, ask your nurse or doctor.            CHANGE how you take these medications    gabapentin 100 MG capsule  Commonly known as: NEURONTIN  TAKE 2 CAPSULES IN THE MORNING AND 3 CAPSULES WITH DINNER  What changed:   · how much to take  · how to take this  · when to take this     LANTUS SOLOSTAR U-100 INSULIN glargine 100 units/mL (3mL) SubQ pen  Generic drug: insulin  Inject 30 Units into the skin every morning.  What changed: when to take this        CONTINUE taking these medications    ACCU-CHEK MOIZ CONTROL SOLN Soln  Generic drug: blood glucose control high,low     ACCU-CHEK MOIZ PLUS TEST STRP Strp  Generic drug: blood sugar diagnostic  TEST THREE TIMES DAILY     ACCU-CHEK SOFTCLIX LANCETS Misc  Generic drug: lancets     amLODIPine 10 MG tablet  Commonly known as: NORVASC  TAKE 1 TABLET(10 MG) BY MOUTH EVERY DAY     apixaban 5 mg Tab  Commonly known as: ELIQUIS  Take 1 tablet (5 mg total) by mouth 2 (two) times daily.     aspirin 81 MG Chew  Take 1 tablet (81 mg total) by mouth once daily.     atorvastatin 40 MG tablet  Commonly known as: LIPITOR  TAKE 1 TABLET (40 MG TOTAL) BY MOUTH EVERY MORNING.     b complex vitamins tablet     BD ALCOHOL SWABS Padm  Generic drug: alcohol swabs     BD ULTRA-FINE ROLAND PEN NEEDLE 32 gauge x 5/32" Ndle  Generic drug: pen needle, diabetic     clopidogreL 75 mg tablet  Commonly known as: PLAVIX  Take 1 tablet (75 mg total) by mouth once daily. 8 pills the first day     ergocalciferol 50,000 unit Cap  Commonly known as: ERGOCALCIFEROL  TAKE ONE CAPSULE BY MOUTH WEEKLY     fenofibrate 160 MG Tab  Take 1 tablet (160 mg total) by mouth every morning.     isosorbide mononitrate 30 MG 24 hr tablet  Commonly known as: IMDUR  TAKE 1 TABLET(30 MG) BY MOUTH EVERY DAY     magnesium 250 mg Tab     metFORMIN 500 MG ER 24hr tablet  Commonly " known as: GLUCOPHAGE-XR  TAKE 2 TABLETS TWICE DAILY WITH MEALS.     metoprolol succinate 50 MG 24 hr tablet  Commonly known as: TOPROL-XL  TAKE 1 TABLET DAILY WITH DINNER OR EVENING MEAL.     nitroGLYCERIN 0.4 MG SL tablet  Commonly known as: NITROSTAT  Place 1 tablet (0.4 mg total) under the tongue every 5 (five) minutes as needed for Chest pain.     NovoLIN R Regular U-100 Insuln 100 unit/mL injection  Generic drug: insulin regular     omega-3 acid ethyl esters 1 gram capsule  Commonly known as: LOVAZA     * OZEMPIC 0.25 mg or 0.5 mg(2 mg/1.5 mL) Pnij  Generic drug: semaglutide  Inject 0.5 mg into the skin every 7 days.     * OZEMPIC 1 mg/dose (2 mg/1.5 mL) Pnij  Generic drug: semaglutide  Inject increase to 0.5 mg weekly. After 4 weeks, increase to 1 mg weekly.     pantoprazole 40 MG tablet  Commonly known as: PROTONIX  Take 1 tablet (40 mg total) by mouth once daily.     tiZANidine 4 MG tablet  Commonly known as: ZANAFLEX     triazolam 0.25 MG Tab  Commonly known as: HALCION  TK 1 T PO QHS, prn     VITAMIN C 100 MG tablet  Generic drug: ascorbic acid (vitamin C)         * This list has 2 medication(s) that are the same as other medications prescribed for you. Read the directions carefully, and ask your doctor or other care provider to review them with you.                SOCIAL HISTORY:     Social History     Socioeconomic History    Marital status:      Spouse name: Not on file    Number of children: Not on file    Years of education: Not on file    Highest education level: Not on file   Occupational History    Not on file   Social Needs    Financial resource strain: Not hard at all    Food insecurity     Worry: Never true     Inability: Never true    Transportation needs     Medical: No     Non-medical: No   Tobacco Use    Smoking status: Never Smoker    Smokeless tobacco: Never Used   Substance and Sexual Activity    Alcohol use: Yes     Frequency: Never     Drinks per session: 1 or 2      Binge frequency: Never     Comment: SOCIALLY    Drug use: No    Sexual activity: Yes     Partners: Female   Lifestyle    Physical activity     Days per week: 1 day     Minutes per session: 60 min    Stress: Only a little   Relationships    Social connections     Talks on phone: More than three times a week     Gets together: Twice a week     Attends Taoist service: Not on file     Active member of club or organization: Yes     Attends meetings of clubs or organizations: More than 4 times per year     Relationship status:    Other Topics Concern    Not on file   Social History Narrative    Not on file       FAMILY HISTORY:     Family History   Problem Relation Age of Onset    Depression Mother     Heart disease Mother     Stroke Father     No Known Problems Sister     Blindness Brother     Diabetes Sister     No Known Problems Sister     No Known Problems Maternal Aunt     No Known Problems Maternal Uncle     No Known Problems Paternal Aunt     No Known Problems Paternal Uncle     No Known Problems Maternal Grandmother     No Known Problems Maternal Grandfather     No Known Problems Paternal Grandmother     No Known Problems Paternal Grandfather     Amblyopia Neg Hx     Cancer Neg Hx     Cataracts Neg Hx     Glaucoma Neg Hx     Hypertension Neg Hx     Macular degeneration Neg Hx     Retinal detachment Neg Hx     Strabismus Neg Hx     Thyroid disease Neg Hx        REVIEW OF SYSTEMS:   Review of Systems   Respiratory: Negative for sputum production, shortness of breath and wheezing.    Cardiovascular: Negative for chest pain, palpitations, orthopnea, claudication, leg swelling and PND.   Gastrointestinal: Negative for abdominal pain, heartburn, nausea and vomiting.   Neurological: Positive for dizziness. Negative for speech change, focal weakness, loss of consciousness, weakness and headaches.       PHYSICAL EXAM:     Vitals:    12/16/20 1037   BP: (!) 155/65   Pulse: (!) 52  "  Resp: 16    Body mass index is 31.49 kg/m².  Weight: 91.2 kg (201 lb 1 oz)   Height: 5' 7" (170.2 cm)     Physical Exam  Vitals signs reviewed.   Constitutional:       General: He is not in acute distress.     Appearance: He is well-developed and overweight. He is not diaphoretic.   Neck:      Vascular: No carotid bruit or JVD.   Cardiovascular:      Rate and Rhythm: Bradycardia present. Rhythm irregularly irregular.      Pulses: Normal pulses.      Heart sounds: Normal heart sounds.   Pulmonary:      Effort: Pulmonary effort is normal.      Breath sounds: Normal breath sounds.   Abdominal:      General: Bowel sounds are normal.      Palpations: Abdomen is soft.      Tenderness: There is no abdominal tenderness.   Musculoskeletal:      Right lower leg: No edema.      Left lower leg: No edema.   Neurological:      Mental Status: He is alert and oriented to person, place, and time.   Psychiatric:         Speech: Speech normal.         Behavior: Behavior normal.         DATA:   EKG: (personally reviewed tracing)  12/16/2020-atrial fibrillation with slow ventricular response  Laboratory:  CBC:  Recent Labs   Lab 08/18/20  0928 11/05/20  1305 12/07/20  1200   WBC 7.02 7.99 5.82   Hemoglobin 11.5 L 12.2 L 10.6 L   Hematocrit 39.7 L 39.2 L 33.8 L   Platelets 227 248 231       CHEMISTRIES:  Recent Labs   Lab 08/18/20  0928 11/05/20  1305 12/07/20  1200   Glucose 147 H 112 H 102   Sodium 140 141 142   Potassium 5.1 4.6 5.1   BUN 27 H 26 H 21   Creatinine 1.2 1.2 1.2   eGFR if African American >60.0 >60 >60   eGFR if non African American >60.0 >60 >60   Calcium 9.8 11.0 H 9.3       CARDIAC BIOMARKERS:        COAGS:  Recent Labs   Lab 11/05/20  1305 12/07/20  1200   INR 1.0 1.0       LIPIDS/LFTS:  Recent Labs   Lab 02/22/19  1546 03/26/19  0805 05/15/19  0828 08/29/19 08/18/20  0928   Cholesterol  --  179 139  --  137   Triglycerides  --  149 458 H 188 H 125   HDL  --  34 L 23 L 27 L 25 L   LDL Cholesterol  --  115.2 " Invalid, Trig>400.0 72 87.0   Non-HDL Cholesterol  --  145 116 100 112   AST 26  --  23  --  18   ALT 33  --  28  --  18       Cardiovascular Testing:    Percutaneous coronary intervention 12/10/2020:    1.  90% mid OM2 stenosis.  2.  Successful PCI with placement of a 2.25 x 26 mm drug-eluting stent reducing the 90% stenosis to 0%.  INGRIS 3 flow.  No dissection.  Good angiographic results.       ASSESSMENT:     1. Coronary artery disease/Hx CABG:   status post PCI  2. Hypertension:  Elevated  3. Hyperlipidemia:  LDL 87  4. Atrial flutter/fibrillation  5. Obesity  6.  Dizziness  7.  Anticoagulation    PLAN:     1. Coronary artery disease:  Denies any recent angina.  Continue current management.  2. Hypertension:   will increase Imdur to 60 mg p.o. q.d..  3. Hyperlipidemia:   continue current management  4. Atrial flutter/fibrillation:  Will have him follow-up with Dr. Cuellar for discussion of MOMO/cardioversion.  Issues with Eliquis secondary to cost.  Also I will decrease his metoprolol secondary to dizziness.  Not sure if his heart rate is too slow.  5. Dizziness:  Decrease metoprolol  6.  Return to clinic with Dr. Cuellar in 2 weeks.      Bob Nogueira MD, MPH, FACC, [facility]

## 2020-12-21 DIAGNOSIS — I48.92 ATRIAL FLUTTER, UNSPECIFIED TYPE: Primary | ICD-10-CM

## 2020-12-21 RX ORDER — WARFARIN SODIUM 5 MG/1
5 TABLET ORAL DAILY
Qty: 30 TABLET | Refills: 11 | Status: SHIPPED | OUTPATIENT
Start: 2020-12-21 | End: 2021-05-10

## 2020-12-28 ENCOUNTER — LAB VISIT (OUTPATIENT)
Dept: LAB | Facility: HOSPITAL | Age: 70
End: 2020-12-28
Attending: INTERNAL MEDICINE
Payer: MEDICARE

## 2020-12-28 DIAGNOSIS — I48.92 ATRIAL FLUTTER, UNSPECIFIED TYPE: ICD-10-CM

## 2020-12-28 LAB
INR PPP: 1.1 (ref 0.8–1.2)
PROTHROMBIN TIME: 11.7 SEC (ref 9–12.5)

## 2020-12-28 PROCEDURE — 36415 COLL VENOUS BLD VENIPUNCTURE: CPT | Mod: HCNC

## 2020-12-28 PROCEDURE — 85610 PROTHROMBIN TIME: CPT | Mod: HCNC

## 2020-12-30 ENCOUNTER — OFFICE VISIT (OUTPATIENT)
Dept: CARDIOLOGY | Facility: CLINIC | Age: 70
End: 2020-12-30
Payer: MEDICARE

## 2020-12-30 VITALS
BODY MASS INDEX: 31.42 KG/M2 | WEIGHT: 200.19 LBS | SYSTOLIC BLOOD PRESSURE: 156 MMHG | HEART RATE: 64 BPM | HEIGHT: 67 IN | OXYGEN SATURATION: 98 % | DIASTOLIC BLOOD PRESSURE: 70 MMHG

## 2020-12-30 DIAGNOSIS — E11.9 DM TYPE 2 WITHOUT RETINOPATHY: ICD-10-CM

## 2020-12-30 DIAGNOSIS — I25.10 CORONARY ARTERY DISEASE INVOLVING NATIVE CORONARY ARTERY OF NATIVE HEART WITHOUT ANGINA PECTORIS: ICD-10-CM

## 2020-12-30 DIAGNOSIS — E78.5 HYPERLIPIDEMIA, UNSPECIFIED HYPERLIPIDEMIA TYPE: ICD-10-CM

## 2020-12-30 DIAGNOSIS — Z76.89 ESTABLISHING CARE WITH NEW DOCTOR, ENCOUNTER FOR: ICD-10-CM

## 2020-12-30 DIAGNOSIS — I10 ESSENTIAL HYPERTENSION: ICD-10-CM

## 2020-12-30 DIAGNOSIS — R07.89 CHEST PAIN, ATYPICAL: ICD-10-CM

## 2020-12-30 DIAGNOSIS — Z95.1 HX OF CABG: Primary | ICD-10-CM

## 2020-12-30 DIAGNOSIS — I48.11 LONGSTANDING PERSISTENT ATRIAL FIBRILLATION: ICD-10-CM

## 2020-12-30 PROCEDURE — 93000 EKG 12-LEAD: ICD-10-PCS | Mod: HCNC,S$GLB,, | Performed by: INTERNAL MEDICINE

## 2020-12-30 PROCEDURE — 1125F PR PAIN SEVERITY QUANTIFIED, PAIN PRESENT: ICD-10-PCS | Mod: HCNC,S$GLB,, | Performed by: INTERNAL MEDICINE

## 2020-12-30 PROCEDURE — 1125F AMNT PAIN NOTED PAIN PRSNT: CPT | Mod: HCNC,S$GLB,, | Performed by: INTERNAL MEDICINE

## 2020-12-30 PROCEDURE — 93000 ELECTROCARDIOGRAM COMPLETE: CPT | Mod: HCNC,S$GLB,, | Performed by: INTERNAL MEDICINE

## 2020-12-30 PROCEDURE — 3078F DIAST BP <80 MM HG: CPT | Mod: HCNC,CPTII,S$GLB, | Performed by: INTERNAL MEDICINE

## 2020-12-30 PROCEDURE — 3077F SYST BP >= 140 MM HG: CPT | Mod: HCNC,CPTII,S$GLB, | Performed by: INTERNAL MEDICINE

## 2020-12-30 PROCEDURE — 3288F PR FALLS RISK ASSESSMENT DOCUMENTED: ICD-10-PCS | Mod: HCNC,CPTII,S$GLB, | Performed by: INTERNAL MEDICINE

## 2020-12-30 PROCEDURE — 99214 PR OFFICE/OUTPT VISIT, EST, LEVL IV, 30-39 MIN: ICD-10-PCS | Mod: HCNC,S$GLB,, | Performed by: INTERNAL MEDICINE

## 2020-12-30 PROCEDURE — 3008F BODY MASS INDEX DOCD: CPT | Mod: HCNC,CPTII,S$GLB, | Performed by: INTERNAL MEDICINE

## 2020-12-30 PROCEDURE — 1101F PR PT FALLS ASSESS DOC 0-1 FALLS W/OUT INJ PAST YR: ICD-10-PCS | Mod: HCNC,CPTII,S$GLB, | Performed by: INTERNAL MEDICINE

## 2020-12-30 PROCEDURE — 3008F PR BODY MASS INDEX (BMI) DOCUMENTED: ICD-10-PCS | Mod: HCNC,CPTII,S$GLB, | Performed by: INTERNAL MEDICINE

## 2020-12-30 PROCEDURE — 3072F PR LOW RISK FOR RETINOPATHY: ICD-10-PCS | Mod: HCNC,S$GLB,, | Performed by: INTERNAL MEDICINE

## 2020-12-30 PROCEDURE — 3044F HG A1C LEVEL LT 7.0%: CPT | Mod: HCNC,CPTII,S$GLB, | Performed by: INTERNAL MEDICINE

## 2020-12-30 PROCEDURE — 3078F PR MOST RECENT DIASTOLIC BLOOD PRESSURE < 80 MM HG: ICD-10-PCS | Mod: HCNC,CPTII,S$GLB, | Performed by: INTERNAL MEDICINE

## 2020-12-30 PROCEDURE — 3072F LOW RISK FOR RETINOPATHY: CPT | Mod: HCNC,S$GLB,, | Performed by: INTERNAL MEDICINE

## 2020-12-30 PROCEDURE — 3044F PR MOST RECENT HEMOGLOBIN A1C LEVEL <7.0%: ICD-10-PCS | Mod: HCNC,CPTII,S$GLB, | Performed by: INTERNAL MEDICINE

## 2020-12-30 PROCEDURE — 99214 OFFICE O/P EST MOD 30 MIN: CPT | Mod: HCNC,S$GLB,, | Performed by: INTERNAL MEDICINE

## 2020-12-30 PROCEDURE — 3288F FALL RISK ASSESSMENT DOCD: CPT | Mod: HCNC,CPTII,S$GLB, | Performed by: INTERNAL MEDICINE

## 2020-12-30 PROCEDURE — 99999 PR PBB SHADOW E&M-EST. PATIENT-LVL III: CPT | Mod: PBBFAC,HCNC,, | Performed by: INTERNAL MEDICINE

## 2020-12-30 PROCEDURE — 3077F PR MOST RECENT SYSTOLIC BLOOD PRESSURE >= 140 MM HG: ICD-10-PCS | Mod: HCNC,CPTII,S$GLB, | Performed by: INTERNAL MEDICINE

## 2020-12-30 PROCEDURE — 1159F PR MEDICATION LIST DOCUMENTED IN MEDICAL RECORD: ICD-10-PCS | Mod: HCNC,S$GLB,, | Performed by: INTERNAL MEDICINE

## 2020-12-30 PROCEDURE — 99999 PR PBB SHADOW E&M-EST. PATIENT-LVL III: ICD-10-PCS | Mod: PBBFAC,HCNC,, | Performed by: INTERNAL MEDICINE

## 2020-12-30 PROCEDURE — 1159F MED LIST DOCD IN RCRD: CPT | Mod: HCNC,S$GLB,, | Performed by: INTERNAL MEDICINE

## 2020-12-30 PROCEDURE — 1101F PT FALLS ASSESS-DOCD LE1/YR: CPT | Mod: HCNC,CPTII,S$GLB, | Performed by: INTERNAL MEDICINE

## 2020-12-30 NOTE — PROGRESS NOTES
Subjective:    Patient ID:  Driss Hernandez Jr. is a 70 y.o. male who presents for follow-up of Follow-up      HPI     CAD/CABG x 3 2016 [facility] - [place] to OM2 12/10/20, HTN, DM, HLD, A-fib - on coumadin     Previously followed by Dr Hernandez  Here follow-up of coronary artery disease.  He denies any worsening cardiopulmonary complaints.  He's not expressing chest pain, shortness of breath or palpitations.  His main limitation stools his back and he's been taking muscle relaxers to help him sleep at night.  He denies any PND, orthopnea or lower edema.  He's not expressing dizziness, presyncope or syncope.  Says he goes to the gym 4 times a week with his wife.  Otherwise been in his usual state of health.     Here for follow-up of coronary artery disease.  He is here for follow-up as well to obtain preop clearance for back procedure including nerve block and possible sympathectomy by pain management.  He denies any cardiopulmonary complaints.  He has experienced no chest pain, shortness of breath or palpitations.  He denies any PND, orthopnea or lower extremity edema. He has not experienced any dizziness, presyncope or syncope.     11/11/20 [facility] - EDP 19, LAD subtotal mid - distal vessel diffusely diseased, Cx - mid long 80% between OM1 and OM2, OM1 90% mid, OM2 80% mid, RCA 80% mid. SVG to D1 patent with good runoff, SVG to PDA patent with good runoff. LIMA to LAD - mid to distal graft with diffuse 80-90% - Distal LAD diffusely diseased     Will review with interventional staff for possible staged PCI of Cx/OM1/OM2     [facility] 12/10/20  1.  90% mid OM2 stenosis.  2.  Successful PCI with placement of a 2.25 x 26 mm drug-eluting stent reducing the 90% stenosis to 0%.  INGRIS 3 flow.  No dissection.  Good angiographic results.     PET stress: 7-17  CONCLUSIONS: NORMAL MYOCARDIAL PERFUSION PET STRESS TEST  1. The perfusion scan is free of evidence for myocardial ischemia or injury.   2. Resting wall motion is physiologic. Stress  wall motion is physiologic.   3. LV function is normal at rest and stress.  (normal is >= 51%)  4. The ventricular volumes are normal at rest and stress.   5. The extracardiac distribution of radioactivity is normal.   6. There was no previous study available to compare.      Stress test 3/18/20    Probably normal joseph myocardial perfusion study.    There is a mild intensity fixed defect in the mid anterior wall of the left ventricle, secondary to attenuation.    Gated perfusion images showed an ejection fraction of 64% post stress.    There is normal wall motion post stress.     Echo 3/18/20  · Normal left ventricular systolic function. The estimated ejection fraction is 60%.  · Concentric left ventricular remodeling.  · No wall motion abnormalities.  · Normal right ventricular systolic function.     Holter within normal limits     3/5/20 Recently having some sharp left sided CP - different from angina prior to CABG  EKG NSR 1st degree AVB - otherwise ok  Echo and lexiscan myoview for CP and known CAD     8/18/20 Saw Dr Willoughby today - c/o SOB - CXR clear. Labs pending  Reports a severe dry cough - denies fever. Some rib soreness from coughing  EKG NSR PVCs NSSTT changes  Suspect cough and SOB are from bronchitis and not CHF or myocardial ischemia. Recent echo and stress test ok  F/U BNP  If symptoms improve then OV 3 months. Consider LHC if symptoms persist without explanation     10/28/20 Continues to have episodes of CP and SOB     11/18/20 Did well after LHC. SOB improved some. Had some recent sharp sticking left sided CP - had been coughing 2 days before   Will refer to Dr Nogueira to evaluate for PCI of Cx/OM1/OM2  Bradycardia well tolerated if this worsens will decrease metoprolol  OV 1 month      EKG 12/16/20 A-fib 47    Saw Dr Nogueira 12/16/20  1. Coronary artery disease:  Denies any recent angina.  Continue current management.  2. Hypertension:   will increase Imdur to 60 mg p.o. q.d..  3.  Hyperlipidemia:   continue current management  4. Atrial flutter/fibrillation:  Will have him follow-up with Dr. Cuellar for discussion of MOMO/cardioversion.  Issues with Eliquis secondary to cost.  Also I will decrease his metoprolol secondary to dizziness.  Not sure if his heart rate is too slow.  5. Dizziness:  Decrease metoprolol  6.  Return to clinic with Dr. Cuellar in 2 weeks.     12/30/20 Reports sharp CP - different from angina  EKG A-fib 64 NSSTT changes  Switching from eliquis to coumadin    Review of Systems   Constitution: Negative for decreased appetite.   HENT: Negative for ear discharge.    Eyes: Negative for blurred vision.   Endocrine: Negative for polyphagia.   Skin: Negative for nail changes.   Genitourinary: Negative for bladder incontinence.   Neurological: Negative for aphonia.   Psychiatric/Behavioral: Negative for hallucinations.   Allergic/Immunologic: Negative for hives.        Objective:    Physical Exam   Constitutional: He is oriented to person, place, and time. He appears well-developed and well-nourished. No distress.   HENT:   Head: Normocephalic and atraumatic.   Eyes: Pupils are equal, round, and reactive to light. Conjunctivae and EOM are normal.   Neck: Normal range of motion. Neck supple. No thyromegaly present.   Cardiovascular: Normal rate, regular rhythm and normal heart sounds.   No murmur heard.  Pulmonary/Chest: Effort normal and breath sounds normal. No respiratory distress. He has no wheezes. He has no rales. He exhibits no tenderness.   Abdominal: Soft. Bowel sounds are normal.   Musculoskeletal:         General: No edema.   Neurological: He is alert and oriented to person, place, and time.   Skin: Skin is warm and dry.   Psychiatric: He has a normal mood and affect. His behavior is normal.         Assessment:       1. Hx of CABG    2. Coronary artery disease involving native coronary artery of native heart without angina pectoris    3. Hyperlipidemia, unspecified  hyperlipidemia type    4. Essential hypertension    5. DM type 2 without retinopathy    6. Chest pain, atypical    7. Longstanding persistent atrial fibrillation         Plan:       A-fib rates improved. Discussed MOMO/CV - he would like to continue with rate control for now  CP mostly atypical  Continue Rx for HTN, CAD,HLD  Stop ASA to avoid bleeding risk with triple therapy  OV 1 month

## 2020-12-31 ENCOUNTER — ANTI-COAG VISIT (OUTPATIENT)
Dept: CARDIOLOGY | Facility: CLINIC | Age: 70
End: 2020-12-31

## 2020-12-31 DIAGNOSIS — Z79.01 LONG TERM (CURRENT) USE OF ANTICOAGULANTS: Primary | ICD-10-CM

## 2020-12-31 DIAGNOSIS — I48.11 LONGSTANDING PERSISTENT ATRIAL FIBRILLATION: ICD-10-CM

## 2020-12-31 NOTE — PROGRESS NOTES
Spoke with Mr. Hernandez. Pt education provided regarding when to call the coumadin clinic as well as diet while taking warfarin. Pt will start warfarin 5mg tonight and overlap with Eliquis thru 1/2. All questions answered and pt verbalized understanding of all information discussed today. First INR on 1/4/21 at [facility]

## 2020-12-31 NOTE — PROGRESS NOTES
71 y/o male on OAC for stroke prevention with atrial fibrillation - transition from DOAC 2/2 cost. Other PMH includes CAD, HTN, DM, HLD, GERD, CKD.    Recommend overlap DOAC with warfarin x3 days if possible (last dose Eliquis 1/2).

## 2021-01-04 ENCOUNTER — LAB VISIT (OUTPATIENT)
Dept: LAB | Facility: HOSPITAL | Age: 71
End: 2021-01-04
Attending: NURSE PRACTITIONER
Payer: MEDICARE

## 2021-01-04 ENCOUNTER — ANTI-COAG VISIT (OUTPATIENT)
Dept: CARDIOLOGY | Facility: CLINIC | Age: 71
End: 2021-01-04
Payer: MEDICARE

## 2021-01-04 DIAGNOSIS — I48.11 LONGSTANDING PERSISTENT ATRIAL FIBRILLATION: ICD-10-CM

## 2021-01-04 DIAGNOSIS — Z79.01 LONG TERM (CURRENT) USE OF ANTICOAGULANTS: ICD-10-CM

## 2021-01-04 LAB
INR PPP: 1.4 (ref 0.8–1.2)
PROTHROMBIN TIME: 15.8 SEC (ref 9–12.5)

## 2021-01-04 PROCEDURE — 85610 PROTHROMBIN TIME: CPT | Mod: HCNC

## 2021-01-04 PROCEDURE — 93793 ANTICOAG MGMT PT WARFARIN: CPT | Mod: S$GLB,,,

## 2021-01-04 PROCEDURE — 93793 PR ANTICOAGULANT MGMT FOR PT TAKING WARFARIN: ICD-10-PCS | Mod: S$GLB,,,

## 2021-01-04 PROCEDURE — 36415 COLL VENOUS BLD VENIPUNCTURE: CPT | Mod: HCNC,PO

## 2021-01-06 ENCOUNTER — ANTI-COAG VISIT (OUTPATIENT)
Dept: CARDIOLOGY | Facility: CLINIC | Age: 71
End: 2021-01-06
Payer: MEDICARE

## 2021-01-06 ENCOUNTER — LAB VISIT (OUTPATIENT)
Dept: LAB | Facility: HOSPITAL | Age: 71
End: 2021-01-06
Attending: INTERNAL MEDICINE
Payer: MEDICARE

## 2021-01-06 ENCOUNTER — OFFICE VISIT (OUTPATIENT)
Dept: ENDOCRINOLOGY | Facility: CLINIC | Age: 71
End: 2021-01-06
Payer: MEDICARE

## 2021-01-06 VITALS
WEIGHT: 202.19 LBS | TEMPERATURE: 98 F | BODY MASS INDEX: 31.67 KG/M2 | HEART RATE: 56 BPM | SYSTOLIC BLOOD PRESSURE: 138 MMHG | DIASTOLIC BLOOD PRESSURE: 64 MMHG

## 2021-01-06 DIAGNOSIS — E87.5 HYPERKALEMIA: ICD-10-CM

## 2021-01-06 DIAGNOSIS — I48.11 LONGSTANDING PERSISTENT ATRIAL FIBRILLATION: ICD-10-CM

## 2021-01-06 DIAGNOSIS — Z79.01 LONG TERM (CURRENT) USE OF ANTICOAGULANTS: ICD-10-CM

## 2021-01-06 DIAGNOSIS — I25.10 CORONARY ARTERY DISEASE INVOLVING NATIVE CORONARY ARTERY OF NATIVE HEART WITHOUT ANGINA PECTORIS: ICD-10-CM

## 2021-01-06 LAB
INR PPP: 1.8 (ref 0.8–1.2)
POTASSIUM SERPL-SCNC: 5.2 MMOL/L (ref 3.5–5.1)
PROTHROMBIN TIME: 19.4 SEC (ref 9–12.5)

## 2021-01-06 PROCEDURE — 99999 PR PBB SHADOW E&M-EST. PATIENT-LVL V: ICD-10-PCS | Mod: PBBFAC,HCNC,, | Performed by: NURSE PRACTITIONER

## 2021-01-06 PROCEDURE — 99214 OFFICE O/P EST MOD 30 MIN: CPT | Mod: HCNC,S$GLB,, | Performed by: NURSE PRACTITIONER

## 2021-01-06 PROCEDURE — 99999 PR PBB SHADOW E&M-EST. PATIENT-LVL V: CPT | Mod: PBBFAC,HCNC,, | Performed by: NURSE PRACTITIONER

## 2021-01-06 PROCEDURE — 1126F PR PAIN SEVERITY QUANTIFIED, NO PAIN PRESENT: ICD-10-PCS | Mod: HCNC,S$GLB,, | Performed by: NURSE PRACTITIONER

## 2021-01-06 PROCEDURE — 3075F PR MOST RECENT SYSTOLIC BLOOD PRESS GE 130-139MM HG: ICD-10-PCS | Mod: HCNC,CPTII,S$GLB, | Performed by: NURSE PRACTITIONER

## 2021-01-06 PROCEDURE — 1126F AMNT PAIN NOTED NONE PRSNT: CPT | Mod: HCNC,S$GLB,, | Performed by: NURSE PRACTITIONER

## 2021-01-06 PROCEDURE — 85610 PROTHROMBIN TIME: CPT | Mod: HCNC

## 2021-01-06 PROCEDURE — 84132 ASSAY OF SERUM POTASSIUM: CPT | Mod: HCNC

## 2021-01-06 PROCEDURE — 99214 PR OFFICE/OUTPT VISIT, EST, LEVL IV, 30-39 MIN: ICD-10-PCS | Mod: HCNC,S$GLB,, | Performed by: NURSE PRACTITIONER

## 2021-01-06 PROCEDURE — 3078F PR MOST RECENT DIASTOLIC BLOOD PRESSURE < 80 MM HG: ICD-10-PCS | Mod: HCNC,CPTII,S$GLB, | Performed by: NURSE PRACTITIONER

## 2021-01-06 PROCEDURE — 3008F PR BODY MASS INDEX (BMI) DOCUMENTED: ICD-10-PCS | Mod: HCNC,CPTII,S$GLB, | Performed by: NURSE PRACTITIONER

## 2021-01-06 PROCEDURE — 36415 COLL VENOUS BLD VENIPUNCTURE: CPT | Mod: HCNC,PN

## 2021-01-06 PROCEDURE — 3044F HG A1C LEVEL LT 7.0%: CPT | Mod: HCNC,CPTII,S$GLB, | Performed by: NURSE PRACTITIONER

## 2021-01-06 PROCEDURE — 3044F PR MOST RECENT HEMOGLOBIN A1C LEVEL <7.0%: ICD-10-PCS | Mod: HCNC,CPTII,S$GLB, | Performed by: NURSE PRACTITIONER

## 2021-01-06 PROCEDURE — 1159F MED LIST DOCD IN RCRD: CPT | Mod: HCNC,S$GLB,, | Performed by: NURSE PRACTITIONER

## 2021-01-06 PROCEDURE — 93793 ANTICOAG MGMT PT WARFARIN: CPT | Mod: S$GLB,,,

## 2021-01-06 PROCEDURE — 3008F BODY MASS INDEX DOCD: CPT | Mod: HCNC,CPTII,S$GLB, | Performed by: NURSE PRACTITIONER

## 2021-01-06 PROCEDURE — 3078F DIAST BP <80 MM HG: CPT | Mod: HCNC,CPTII,S$GLB, | Performed by: NURSE PRACTITIONER

## 2021-01-06 PROCEDURE — 1159F PR MEDICATION LIST DOCUMENTED IN MEDICAL RECORD: ICD-10-PCS | Mod: HCNC,S$GLB,, | Performed by: NURSE PRACTITIONER

## 2021-01-06 PROCEDURE — 3075F SYST BP GE 130 - 139MM HG: CPT | Mod: HCNC,CPTII,S$GLB, | Performed by: NURSE PRACTITIONER

## 2021-01-06 PROCEDURE — 93793 PR ANTICOAGULANT MGMT FOR PT TAKING WARFARIN: ICD-10-PCS | Mod: S$GLB,,,

## 2021-01-06 RX ORDER — GLIMEPIRIDE 4 MG/1
4 TABLET ORAL
Qty: 30 TABLET | Refills: 1 | Status: SHIPPED | OUTPATIENT
Start: 2021-01-06 | End: 2021-02-03

## 2021-01-06 RX ORDER — SEMAGLUTIDE 1.34 MG/ML
INJECTION, SOLUTION SUBCUTANEOUS
Qty: 6 SYRINGE | Refills: 2 | Status: SHIPPED | OUTPATIENT
Start: 2021-01-06 | End: 2021-09-20

## 2021-01-11 ENCOUNTER — LAB VISIT (OUTPATIENT)
Dept: LAB | Facility: HOSPITAL | Age: 71
End: 2021-01-11
Attending: INTERNAL MEDICINE
Payer: MEDICARE

## 2021-01-11 ENCOUNTER — ANTI-COAG VISIT (OUTPATIENT)
Dept: CARDIOLOGY | Facility: CLINIC | Age: 71
End: 2021-01-11
Payer: MEDICARE

## 2021-01-11 DIAGNOSIS — I48.11 LONGSTANDING PERSISTENT ATRIAL FIBRILLATION: ICD-10-CM

## 2021-01-11 DIAGNOSIS — Z79.01 LONG TERM (CURRENT) USE OF ANTICOAGULANTS: ICD-10-CM

## 2021-01-11 LAB
INR PPP: 2.5 (ref 0.8–1.2)
PROTHROMBIN TIME: 28.1 SEC (ref 9–12.5)

## 2021-01-11 PROCEDURE — 36415 COLL VENOUS BLD VENIPUNCTURE: CPT | Mod: HCNC,PN

## 2021-01-11 PROCEDURE — 93793 ANTICOAG MGMT PT WARFARIN: CPT | Mod: S$GLB,,,

## 2021-01-11 PROCEDURE — 85610 PROTHROMBIN TIME: CPT | Mod: HCNC

## 2021-01-11 PROCEDURE — 93793 PR ANTICOAGULANT MGMT FOR PT TAKING WARFARIN: ICD-10-PCS | Mod: S$GLB,,,

## 2021-01-15 ENCOUNTER — TELEPHONE (OUTPATIENT)
Dept: ENDOCRINOLOGY | Facility: CLINIC | Age: 71
End: 2021-01-15

## 2021-01-19 ENCOUNTER — ANTI-COAG VISIT (OUTPATIENT)
Dept: CARDIOLOGY | Facility: CLINIC | Age: 71
End: 2021-01-19
Payer: MEDICARE

## 2021-01-19 ENCOUNTER — LAB VISIT (OUTPATIENT)
Dept: LAB | Facility: HOSPITAL | Age: 71
End: 2021-01-19
Attending: INTERNAL MEDICINE
Payer: MEDICARE

## 2021-01-19 DIAGNOSIS — I48.11 LONGSTANDING PERSISTENT ATRIAL FIBRILLATION: ICD-10-CM

## 2021-01-19 DIAGNOSIS — Z79.01 LONG TERM (CURRENT) USE OF ANTICOAGULANTS: ICD-10-CM

## 2021-01-19 LAB
INR PPP: 2.3 (ref 0.8–1.2)
PROTHROMBIN TIME: 23.7 SEC (ref 9–12.5)

## 2021-01-19 PROCEDURE — 93793 PR ANTICOAGULANT MGMT FOR PT TAKING WARFARIN: ICD-10-PCS | Mod: S$GLB,,,

## 2021-01-19 PROCEDURE — 85610 PROTHROMBIN TIME: CPT

## 2021-01-19 PROCEDURE — 36415 COLL VENOUS BLD VENIPUNCTURE: CPT | Mod: PN

## 2021-01-19 PROCEDURE — 93793 ANTICOAG MGMT PT WARFARIN: CPT | Mod: S$GLB,,,

## 2021-01-22 ENCOUNTER — TELEPHONE (OUTPATIENT)
Dept: ENDOCRINOLOGY | Facility: CLINIC | Age: 71
End: 2021-01-22

## 2021-01-22 ENCOUNTER — PATIENT MESSAGE (OUTPATIENT)
Dept: ADMINISTRATIVE | Facility: OTHER | Age: 71
End: 2021-01-22

## 2021-01-25 RX ORDER — ERGOCALCIFEROL 1.25 MG/1
CAPSULE ORAL
Qty: 12 CAPSULE | Refills: 2 | Status: SHIPPED | OUTPATIENT
Start: 2021-01-25 | End: 2021-03-13 | Stop reason: SDUPTHER

## 2021-01-28 ENCOUNTER — LAB VISIT (OUTPATIENT)
Dept: LAB | Facility: HOSPITAL | Age: 71
End: 2021-01-28
Attending: INTERNAL MEDICINE
Payer: MEDICARE

## 2021-01-28 ENCOUNTER — OFFICE VISIT (OUTPATIENT)
Dept: OPHTHALMOLOGY | Facility: CLINIC | Age: 71
End: 2021-01-28
Payer: MEDICARE

## 2021-01-28 ENCOUNTER — ANTI-COAG VISIT (OUTPATIENT)
Dept: CARDIOLOGY | Facility: CLINIC | Age: 71
End: 2021-01-28
Payer: MEDICARE

## 2021-01-28 DIAGNOSIS — I48.11 LONGSTANDING PERSISTENT ATRIAL FIBRILLATION: ICD-10-CM

## 2021-01-28 DIAGNOSIS — H40.053 OHT (OCULAR HYPERTENSION), BILATERAL: ICD-10-CM

## 2021-01-28 DIAGNOSIS — H25.12 NUCLEAR SCLEROSIS, LEFT: Primary | ICD-10-CM

## 2021-01-28 DIAGNOSIS — Z79.01 LONG TERM (CURRENT) USE OF ANTICOAGULANTS: ICD-10-CM

## 2021-01-28 DIAGNOSIS — I48.11 LONGSTANDING PERSISTENT ATRIAL FIBRILLATION: Primary | ICD-10-CM

## 2021-01-28 DIAGNOSIS — Z96.1 PSEUDOPHAKIA: ICD-10-CM

## 2021-01-28 DIAGNOSIS — E11.9 DM TYPE 2 WITHOUT RETINOPATHY: ICD-10-CM

## 2021-01-28 DIAGNOSIS — H52.7 REFRACTIVE ERROR: ICD-10-CM

## 2021-01-28 DIAGNOSIS — I10 ESSENTIAL HYPERTENSION: ICD-10-CM

## 2021-01-28 LAB
INR PPP: 2.1 (ref 0.8–1.2)
PROTHROMBIN TIME: 21.3 SEC (ref 9–12.5)

## 2021-01-28 PROCEDURE — 2023F PR DILATED RETINAL EXAM W/O EVID OF RETINOPATHY: ICD-10-PCS | Mod: S$GLB,,, | Performed by: OPHTHALMOLOGY

## 2021-01-28 PROCEDURE — 1126F PR PAIN SEVERITY QUANTIFIED, NO PAIN PRESENT: ICD-10-PCS | Mod: S$GLB,,, | Performed by: OPHTHALMOLOGY

## 2021-01-28 PROCEDURE — 1126F AMNT PAIN NOTED NONE PRSNT: CPT | Mod: S$GLB,,, | Performed by: OPHTHALMOLOGY

## 2021-01-28 PROCEDURE — 1101F PT FALLS ASSESS-DOCD LE1/YR: CPT | Mod: CPTII,S$GLB,, | Performed by: OPHTHALMOLOGY

## 2021-01-28 PROCEDURE — 99999 PR PBB SHADOW E&M-EST. PATIENT-LVL IV: CPT | Mod: PBBFAC,,, | Performed by: OPHTHALMOLOGY

## 2021-01-28 PROCEDURE — 1101F PR PT FALLS ASSESS DOC 0-1 FALLS W/OUT INJ PAST YR: ICD-10-PCS | Mod: CPTII,S$GLB,, | Performed by: OPHTHALMOLOGY

## 2021-01-28 PROCEDURE — 93793 ANTICOAG MGMT PT WARFARIN: CPT | Mod: S$GLB,,,

## 2021-01-28 PROCEDURE — 3288F FALL RISK ASSESSMENT DOCD: CPT | Mod: CPTII,S$GLB,, | Performed by: OPHTHALMOLOGY

## 2021-01-28 PROCEDURE — 92014 PR EYE EXAM, EST PATIENT,COMPREHESV: ICD-10-PCS | Mod: S$GLB,,, | Performed by: OPHTHALMOLOGY

## 2021-01-28 PROCEDURE — 92014 COMPRE OPH EXAM EST PT 1/>: CPT | Mod: S$GLB,,, | Performed by: OPHTHALMOLOGY

## 2021-01-28 PROCEDURE — 85610 PROTHROMBIN TIME: CPT

## 2021-01-28 PROCEDURE — 99999 PR PBB SHADOW E&M-EST. PATIENT-LVL IV: ICD-10-PCS | Mod: PBBFAC,,, | Performed by: OPHTHALMOLOGY

## 2021-01-28 PROCEDURE — 36415 COLL VENOUS BLD VENIPUNCTURE: CPT | Mod: PO

## 2021-01-28 PROCEDURE — 3288F PR FALLS RISK ASSESSMENT DOCUMENTED: ICD-10-PCS | Mod: CPTII,S$GLB,, | Performed by: OPHTHALMOLOGY

## 2021-01-28 PROCEDURE — 2023F DILAT RTA XM W/O RTNOPTHY: CPT | Mod: S$GLB,,, | Performed by: OPHTHALMOLOGY

## 2021-01-28 PROCEDURE — 93793 PR ANTICOAGULANT MGMT FOR PT TAKING WARFARIN: ICD-10-PCS | Mod: S$GLB,,,

## 2021-01-29 ENCOUNTER — PATIENT MESSAGE (OUTPATIENT)
Dept: PAIN MEDICINE | Facility: CLINIC | Age: 71
End: 2021-01-29

## 2021-02-01 ENCOUNTER — PATIENT MESSAGE (OUTPATIENT)
Dept: FAMILY MEDICINE | Facility: CLINIC | Age: 71
End: 2021-02-01

## 2021-02-01 DIAGNOSIS — M47.816 LUMBAR SPONDYLOSIS: ICD-10-CM

## 2021-02-01 DIAGNOSIS — M51.36 DDD (DEGENERATIVE DISC DISEASE), LUMBAR: ICD-10-CM

## 2021-02-01 DIAGNOSIS — M79.18 MYOFASCIAL PAIN: ICD-10-CM

## 2021-02-01 RX ORDER — TIZANIDINE 4 MG/1
16 TABLET ORAL NIGHTLY
Qty: 360 TABLET | Refills: 3 | Status: SHIPPED | OUTPATIENT
Start: 2021-02-01 | End: 2022-02-01

## 2021-02-02 ENCOUNTER — OFFICE VISIT (OUTPATIENT)
Dept: CARDIOLOGY | Facility: CLINIC | Age: 71
End: 2021-02-02
Payer: MEDICARE

## 2021-02-02 VITALS
SYSTOLIC BLOOD PRESSURE: 143 MMHG | DIASTOLIC BLOOD PRESSURE: 71 MMHG | HEIGHT: 67 IN | WEIGHT: 202.19 LBS | BODY MASS INDEX: 31.73 KG/M2 | OXYGEN SATURATION: 78 % | HEART RATE: 49 BPM

## 2021-02-02 DIAGNOSIS — Z95.1 HX OF CABG: Primary | ICD-10-CM

## 2021-02-02 DIAGNOSIS — R07.89 CHEST PAIN, ATYPICAL: ICD-10-CM

## 2021-02-02 DIAGNOSIS — I25.10 CORONARY ARTERY DISEASE INVOLVING NATIVE CORONARY ARTERY OF NATIVE HEART WITHOUT ANGINA PECTORIS: ICD-10-CM

## 2021-02-02 DIAGNOSIS — E78.5 HYPERLIPIDEMIA, UNSPECIFIED HYPERLIPIDEMIA TYPE: ICD-10-CM

## 2021-02-02 DIAGNOSIS — I10 ESSENTIAL HYPERTENSION: ICD-10-CM

## 2021-02-02 PROCEDURE — 99999 PR PBB SHADOW E&M-EST. PATIENT-LVL V: CPT | Mod: PBBFAC,,, | Performed by: INTERNAL MEDICINE

## 2021-02-02 PROCEDURE — 3008F PR BODY MASS INDEX (BMI) DOCUMENTED: ICD-10-PCS | Mod: CPTII,S$GLB,, | Performed by: INTERNAL MEDICINE

## 2021-02-02 PROCEDURE — 1101F PT FALLS ASSESS-DOCD LE1/YR: CPT | Mod: CPTII,S$GLB,, | Performed by: INTERNAL MEDICINE

## 2021-02-02 PROCEDURE — 99214 PR OFFICE/OUTPT VISIT, EST, LEVL IV, 30-39 MIN: ICD-10-PCS | Mod: S$GLB,,, | Performed by: INTERNAL MEDICINE

## 2021-02-02 PROCEDURE — 3078F DIAST BP <80 MM HG: CPT | Mod: CPTII,S$GLB,, | Performed by: INTERNAL MEDICINE

## 2021-02-02 PROCEDURE — 1159F MED LIST DOCD IN RCRD: CPT | Mod: S$GLB,,, | Performed by: INTERNAL MEDICINE

## 2021-02-02 PROCEDURE — 3288F PR FALLS RISK ASSESSMENT DOCUMENTED: ICD-10-PCS | Mod: CPTII,S$GLB,, | Performed by: INTERNAL MEDICINE

## 2021-02-02 PROCEDURE — 3077F SYST BP >= 140 MM HG: CPT | Mod: CPTII,S$GLB,, | Performed by: INTERNAL MEDICINE

## 2021-02-02 PROCEDURE — 1126F AMNT PAIN NOTED NONE PRSNT: CPT | Mod: S$GLB,,, | Performed by: INTERNAL MEDICINE

## 2021-02-02 PROCEDURE — 3008F BODY MASS INDEX DOCD: CPT | Mod: CPTII,S$GLB,, | Performed by: INTERNAL MEDICINE

## 2021-02-02 PROCEDURE — 1101F PR PT FALLS ASSESS DOC 0-1 FALLS W/OUT INJ PAST YR: ICD-10-PCS | Mod: CPTII,S$GLB,, | Performed by: INTERNAL MEDICINE

## 2021-02-02 PROCEDURE — 3078F PR MOST RECENT DIASTOLIC BLOOD PRESSURE < 80 MM HG: ICD-10-PCS | Mod: CPTII,S$GLB,, | Performed by: INTERNAL MEDICINE

## 2021-02-02 PROCEDURE — 99999 PR PBB SHADOW E&M-EST. PATIENT-LVL V: ICD-10-PCS | Mod: PBBFAC,,, | Performed by: INTERNAL MEDICINE

## 2021-02-02 PROCEDURE — 1126F PR PAIN SEVERITY QUANTIFIED, NO PAIN PRESENT: ICD-10-PCS | Mod: S$GLB,,, | Performed by: INTERNAL MEDICINE

## 2021-02-02 PROCEDURE — 3077F PR MOST RECENT SYSTOLIC BLOOD PRESSURE >= 140 MM HG: ICD-10-PCS | Mod: CPTII,S$GLB,, | Performed by: INTERNAL MEDICINE

## 2021-02-02 PROCEDURE — 3288F FALL RISK ASSESSMENT DOCD: CPT | Mod: CPTII,S$GLB,, | Performed by: INTERNAL MEDICINE

## 2021-02-02 PROCEDURE — 99214 OFFICE O/P EST MOD 30 MIN: CPT | Mod: S$GLB,,, | Performed by: INTERNAL MEDICINE

## 2021-02-02 PROCEDURE — 1159F PR MEDICATION LIST DOCUMENTED IN MEDICAL RECORD: ICD-10-PCS | Mod: S$GLB,,, | Performed by: INTERNAL MEDICINE

## 2021-02-03 ENCOUNTER — OFFICE VISIT (OUTPATIENT)
Dept: ENDOCRINOLOGY | Facility: CLINIC | Age: 71
End: 2021-02-03
Payer: MEDICARE

## 2021-02-03 VITALS
HEART RATE: 82 BPM | SYSTOLIC BLOOD PRESSURE: 124 MMHG | BODY MASS INDEX: 31.4 KG/M2 | TEMPERATURE: 98 F | WEIGHT: 200.5 LBS | DIASTOLIC BLOOD PRESSURE: 70 MMHG

## 2021-02-03 DIAGNOSIS — I25.10 CORONARY ARTERY DISEASE INVOLVING NATIVE CORONARY ARTERY OF NATIVE HEART WITHOUT ANGINA PECTORIS: ICD-10-CM

## 2021-02-03 DIAGNOSIS — E11.649 HYPOGLYCEMIA ASSOCIATED WITH DIABETES: ICD-10-CM

## 2021-02-03 PROCEDURE — 3078F DIAST BP <80 MM HG: CPT | Mod: CPTII,S$GLB,, | Performed by: NURSE PRACTITIONER

## 2021-02-03 PROCEDURE — 99999 PR PBB SHADOW E&M-EST. PATIENT-LVL V: ICD-10-PCS | Mod: PBBFAC,,, | Performed by: NURSE PRACTITIONER

## 2021-02-03 PROCEDURE — 3074F SYST BP LT 130 MM HG: CPT | Mod: CPTII,S$GLB,, | Performed by: NURSE PRACTITIONER

## 2021-02-03 PROCEDURE — 99214 OFFICE O/P EST MOD 30 MIN: CPT | Mod: S$GLB,,, | Performed by: NURSE PRACTITIONER

## 2021-02-03 PROCEDURE — 3044F PR MOST RECENT HEMOGLOBIN A1C LEVEL <7.0%: ICD-10-PCS | Mod: CPTII,S$GLB,, | Performed by: NURSE PRACTITIONER

## 2021-02-03 PROCEDURE — 1159F PR MEDICATION LIST DOCUMENTED IN MEDICAL RECORD: ICD-10-PCS | Mod: S$GLB,,, | Performed by: NURSE PRACTITIONER

## 2021-02-03 PROCEDURE — 99999 PR PBB SHADOW E&M-EST. PATIENT-LVL V: CPT | Mod: PBBFAC,,, | Performed by: NURSE PRACTITIONER

## 2021-02-03 PROCEDURE — 1126F PR PAIN SEVERITY QUANTIFIED, NO PAIN PRESENT: ICD-10-PCS | Mod: S$GLB,,, | Performed by: NURSE PRACTITIONER

## 2021-02-03 PROCEDURE — 3008F BODY MASS INDEX DOCD: CPT | Mod: CPTII,S$GLB,, | Performed by: NURSE PRACTITIONER

## 2021-02-03 PROCEDURE — 3044F HG A1C LEVEL LT 7.0%: CPT | Mod: CPTII,S$GLB,, | Performed by: NURSE PRACTITIONER

## 2021-02-03 PROCEDURE — 1159F MED LIST DOCD IN RCRD: CPT | Mod: S$GLB,,, | Performed by: NURSE PRACTITIONER

## 2021-02-03 PROCEDURE — 99214 PR OFFICE/OUTPT VISIT, EST, LEVL IV, 30-39 MIN: ICD-10-PCS | Mod: S$GLB,,, | Performed by: NURSE PRACTITIONER

## 2021-02-03 PROCEDURE — 3078F PR MOST RECENT DIASTOLIC BLOOD PRESSURE < 80 MM HG: ICD-10-PCS | Mod: CPTII,S$GLB,, | Performed by: NURSE PRACTITIONER

## 2021-02-03 PROCEDURE — 3008F PR BODY MASS INDEX (BMI) DOCUMENTED: ICD-10-PCS | Mod: CPTII,S$GLB,, | Performed by: NURSE PRACTITIONER

## 2021-02-03 PROCEDURE — 1126F AMNT PAIN NOTED NONE PRSNT: CPT | Mod: S$GLB,,, | Performed by: NURSE PRACTITIONER

## 2021-02-03 PROCEDURE — 3074F PR MOST RECENT SYSTOLIC BLOOD PRESSURE < 130 MM HG: ICD-10-PCS | Mod: CPTII,S$GLB,, | Performed by: NURSE PRACTITIONER

## 2021-02-03 RX ORDER — GLIMEPIRIDE 2 MG/1
2 TABLET ORAL
Qty: 90 TABLET | Refills: 0 | Status: SHIPPED | OUTPATIENT
Start: 2021-02-03 | End: 2021-06-13 | Stop reason: SDUPTHER

## 2021-02-03 RX ORDER — METFORMIN HYDROCHLORIDE 500 MG/1
TABLET, EXTENDED RELEASE ORAL
Qty: 360 TABLET | Refills: 0 | Status: SHIPPED | OUTPATIENT
Start: 2021-02-03 | End: 2021-06-13 | Stop reason: SDUPTHER

## 2021-02-11 ENCOUNTER — LAB VISIT (OUTPATIENT)
Dept: LAB | Facility: HOSPITAL | Age: 71
End: 2021-02-11
Attending: INTERNAL MEDICINE
Payer: MEDICARE

## 2021-02-11 DIAGNOSIS — Z79.01 LONG TERM (CURRENT) USE OF ANTICOAGULANTS: ICD-10-CM

## 2021-02-11 DIAGNOSIS — I48.11 LONGSTANDING PERSISTENT ATRIAL FIBRILLATION: ICD-10-CM

## 2021-02-11 LAB
INR PPP: 1.8 (ref 0.8–1.2)
PROTHROMBIN TIME: 18.7 SEC (ref 9–12.5)

## 2021-02-11 PROCEDURE — 36415 COLL VENOUS BLD VENIPUNCTURE: CPT | Mod: PO

## 2021-02-11 PROCEDURE — 85610 PROTHROMBIN TIME: CPT

## 2021-02-12 ENCOUNTER — ANTI-COAG VISIT (OUTPATIENT)
Dept: CARDIOLOGY | Facility: CLINIC | Age: 71
End: 2021-02-12
Payer: MEDICARE

## 2021-02-12 DIAGNOSIS — Z79.01 LONG TERM (CURRENT) USE OF ANTICOAGULANTS: ICD-10-CM

## 2021-02-12 DIAGNOSIS — I48.11 LONGSTANDING PERSISTENT ATRIAL FIBRILLATION: Primary | ICD-10-CM

## 2021-02-12 PROCEDURE — 93793 PR ANTICOAGULANT MGMT FOR PT TAKING WARFARIN: ICD-10-PCS | Mod: S$GLB,,,

## 2021-02-12 PROCEDURE — 93793 ANTICOAG MGMT PT WARFARIN: CPT | Mod: S$GLB,,,

## 2021-02-18 ENCOUNTER — ANTI-COAG VISIT (OUTPATIENT)
Dept: CARDIOLOGY | Facility: CLINIC | Age: 71
End: 2021-02-18
Payer: MEDICARE

## 2021-02-18 ENCOUNTER — LAB VISIT (OUTPATIENT)
Dept: LAB | Facility: HOSPITAL | Age: 71
End: 2021-02-18
Attending: INTERNAL MEDICINE
Payer: MEDICARE

## 2021-02-18 DIAGNOSIS — Z79.01 LONG TERM (CURRENT) USE OF ANTICOAGULANTS: ICD-10-CM

## 2021-02-18 DIAGNOSIS — I48.11 LONGSTANDING PERSISTENT ATRIAL FIBRILLATION: ICD-10-CM

## 2021-02-18 DIAGNOSIS — I48.11 LONGSTANDING PERSISTENT ATRIAL FIBRILLATION: Primary | ICD-10-CM

## 2021-02-18 LAB
INR PPP: 2 (ref 0.8–1.2)
PROTHROMBIN TIME: 20 SEC (ref 9–12.5)

## 2021-02-18 PROCEDURE — 93793 ANTICOAG MGMT PT WARFARIN: CPT | Mod: S$GLB,,,

## 2021-02-18 PROCEDURE — 85610 PROTHROMBIN TIME: CPT

## 2021-02-18 PROCEDURE — 36415 COLL VENOUS BLD VENIPUNCTURE: CPT | Mod: PO

## 2021-02-18 PROCEDURE — 93793 PR ANTICOAGULANT MGMT FOR PT TAKING WARFARIN: ICD-10-PCS | Mod: S$GLB,,,

## 2021-03-04 ENCOUNTER — LAB VISIT (OUTPATIENT)
Dept: LAB | Facility: HOSPITAL | Age: 71
End: 2021-03-04
Attending: INTERNAL MEDICINE
Payer: MEDICARE

## 2021-03-04 DIAGNOSIS — Z79.01 LONG TERM (CURRENT) USE OF ANTICOAGULANTS: ICD-10-CM

## 2021-03-04 DIAGNOSIS — I48.11 LONGSTANDING PERSISTENT ATRIAL FIBRILLATION: ICD-10-CM

## 2021-03-04 LAB
INR PPP: 1.9 (ref 0.8–1.2)
PROTHROMBIN TIME: 19 SEC (ref 9–12.5)

## 2021-03-04 PROCEDURE — 85610 PROTHROMBIN TIME: CPT | Performed by: INTERNAL MEDICINE

## 2021-03-04 PROCEDURE — 36415 COLL VENOUS BLD VENIPUNCTURE: CPT | Mod: PO | Performed by: INTERNAL MEDICINE

## 2021-03-05 ENCOUNTER — ANTI-COAG VISIT (OUTPATIENT)
Dept: CARDIOLOGY | Facility: CLINIC | Age: 71
End: 2021-03-05
Payer: MEDICARE

## 2021-03-05 DIAGNOSIS — Z79.01 LONG TERM (CURRENT) USE OF ANTICOAGULANTS: ICD-10-CM

## 2021-03-05 DIAGNOSIS — I48.11 LONGSTANDING PERSISTENT ATRIAL FIBRILLATION: Primary | ICD-10-CM

## 2021-03-05 PROCEDURE — 93793 ANTICOAG MGMT PT WARFARIN: CPT | Mod: S$GLB,,,

## 2021-03-05 PROCEDURE — 93793 PR ANTICOAGULANT MGMT FOR PT TAKING WARFARIN: ICD-10-PCS | Mod: S$GLB,,,

## 2021-03-08 ENCOUNTER — TELEPHONE (OUTPATIENT)
Dept: ENDOCRINOLOGY | Facility: CLINIC | Age: 71
End: 2021-03-08

## 2021-03-10 ENCOUNTER — PATIENT MESSAGE (OUTPATIENT)
Dept: ENDOCRINOLOGY | Facility: CLINIC | Age: 71
End: 2021-03-10

## 2021-03-13 ENCOUNTER — PATIENT MESSAGE (OUTPATIENT)
Dept: ENDOCRINOLOGY | Facility: CLINIC | Age: 71
End: 2021-03-13

## 2021-03-16 RX ORDER — PANTOPRAZOLE SODIUM 40 MG/1
40 TABLET, DELAYED RELEASE ORAL DAILY
Qty: 90 TABLET | Refills: 3 | Status: SHIPPED | OUTPATIENT
Start: 2021-03-16 | End: 2021-03-16 | Stop reason: SDUPTHER

## 2021-03-17 RX ORDER — PANTOPRAZOLE SODIUM 40 MG/1
40 TABLET, DELAYED RELEASE ORAL DAILY
Qty: 90 TABLET | Refills: 3 | Status: SHIPPED | OUTPATIENT
Start: 2021-03-17 | End: 2021-03-18 | Stop reason: SDUPTHER

## 2021-03-18 ENCOUNTER — LAB VISIT (OUTPATIENT)
Dept: LAB | Facility: HOSPITAL | Age: 71
End: 2021-03-18
Attending: NURSE PRACTITIONER
Payer: MEDICARE

## 2021-03-18 ENCOUNTER — ANTI-COAG VISIT (OUTPATIENT)
Dept: CARDIOLOGY | Facility: CLINIC | Age: 71
End: 2021-03-18
Payer: MEDICARE

## 2021-03-18 DIAGNOSIS — Z79.01 LONG TERM (CURRENT) USE OF ANTICOAGULANTS: ICD-10-CM

## 2021-03-18 DIAGNOSIS — I48.11 LONGSTANDING PERSISTENT ATRIAL FIBRILLATION: Primary | ICD-10-CM

## 2021-03-18 DIAGNOSIS — I48.11 LONGSTANDING PERSISTENT ATRIAL FIBRILLATION: ICD-10-CM

## 2021-03-18 LAB
INR PPP: 2.4 (ref 0.8–1.2)
PROTHROMBIN TIME: 24.1 SEC (ref 9–12.5)

## 2021-03-18 PROCEDURE — 93793 ANTICOAG MGMT PT WARFARIN: CPT | Mod: S$GLB,,,

## 2021-03-18 PROCEDURE — 36415 COLL VENOUS BLD VENIPUNCTURE: CPT | Mod: PO | Performed by: INTERNAL MEDICINE

## 2021-03-18 PROCEDURE — 93793 PR ANTICOAGULANT MGMT FOR PT TAKING WARFARIN: ICD-10-PCS | Mod: S$GLB,,,

## 2021-03-18 PROCEDURE — 85610 PROTHROMBIN TIME: CPT | Performed by: INTERNAL MEDICINE

## 2021-03-18 RX ORDER — PANTOPRAZOLE SODIUM 40 MG/1
40 TABLET, DELAYED RELEASE ORAL DAILY
Qty: 90 TABLET | Refills: 3 | Status: SHIPPED | OUTPATIENT
Start: 2021-03-18 | End: 2021-03-24 | Stop reason: SDUPTHER

## 2021-03-24 RX ORDER — PANTOPRAZOLE SODIUM 40 MG/1
40 TABLET, DELAYED RELEASE ORAL DAILY
Qty: 90 TABLET | Refills: 3 | Status: SHIPPED | OUTPATIENT
Start: 2021-03-24 | End: 2022-05-28

## 2021-03-25 ENCOUNTER — TELEPHONE (OUTPATIENT)
Dept: ENDOCRINOLOGY | Facility: CLINIC | Age: 71
End: 2021-03-25

## 2021-03-25 RX ORDER — AMLODIPINE BESYLATE 10 MG/1
10 TABLET ORAL DAILY
Qty: 90 TABLET | Refills: 3 | Status: SHIPPED | OUTPATIENT
Start: 2021-03-25 | End: 2021-03-31

## 2021-03-29 ENCOUNTER — TELEPHONE (OUTPATIENT)
Dept: FAMILY MEDICINE | Facility: CLINIC | Age: 71
End: 2021-03-29

## 2021-03-30 ENCOUNTER — OFFICE VISIT (OUTPATIENT)
Dept: FAMILY MEDICINE | Facility: CLINIC | Age: 71
End: 2021-03-30
Payer: MEDICARE

## 2021-03-30 VITALS
BODY MASS INDEX: 30.63 KG/M2 | DIASTOLIC BLOOD PRESSURE: 60 MMHG | HEIGHT: 67 IN | WEIGHT: 195.13 LBS | OXYGEN SATURATION: 98 % | HEART RATE: 60 BPM | SYSTOLIC BLOOD PRESSURE: 128 MMHG | TEMPERATURE: 98 F

## 2021-03-30 DIAGNOSIS — E11.42 DIABETIC POLYNEUROPATHY ASSOCIATED WITH TYPE 2 DIABETES MELLITUS: ICD-10-CM

## 2021-03-30 DIAGNOSIS — I70.0 ABDOMINAL AORTIC ATHEROSCLEROSIS: ICD-10-CM

## 2021-03-30 DIAGNOSIS — E08.22 DIABETES MELLITUS DUE TO UNDERLYING CONDITION WITH STAGE 3 CHRONIC KIDNEY DISEASE, WITH LONG-TERM CURRENT USE OF INSULIN, UNSPECIFIED WHETHER STAGE 3A OR 3B CKD: ICD-10-CM

## 2021-03-30 DIAGNOSIS — F13.20 SEDATIVE DEPENDENCE: ICD-10-CM

## 2021-03-30 DIAGNOSIS — Z79.01 LONG TERM (CURRENT) USE OF ANTICOAGULANTS: ICD-10-CM

## 2021-03-30 DIAGNOSIS — N18.30 DIABETES MELLITUS DUE TO UNDERLYING CONDITION WITH STAGE 3 CHRONIC KIDNEY DISEASE, WITH LONG-TERM CURRENT USE OF INSULIN, UNSPECIFIED WHETHER STAGE 3A OR 3B CKD: ICD-10-CM

## 2021-03-30 DIAGNOSIS — E11.9 DM TYPE 2 WITHOUT RETINOPATHY: ICD-10-CM

## 2021-03-30 DIAGNOSIS — N18.30 STAGE 3 CHRONIC KIDNEY DISEASE, UNSPECIFIED WHETHER STAGE 3A OR 3B CKD: ICD-10-CM

## 2021-03-30 DIAGNOSIS — I48.11 LONGSTANDING PERSISTENT ATRIAL FIBRILLATION: ICD-10-CM

## 2021-03-30 DIAGNOSIS — Z79.4 DIABETES MELLITUS DUE TO UNDERLYING CONDITION WITH STAGE 3 CHRONIC KIDNEY DISEASE, WITH LONG-TERM CURRENT USE OF INSULIN, UNSPECIFIED WHETHER STAGE 3A OR 3B CKD: ICD-10-CM

## 2021-03-30 DIAGNOSIS — E78.2 MIXED HYPERLIPIDEMIA: ICD-10-CM

## 2021-03-30 DIAGNOSIS — I10 ESSENTIAL HYPERTENSION: ICD-10-CM

## 2021-03-30 DIAGNOSIS — R79.89 ABNORMAL TSH: ICD-10-CM

## 2021-03-30 DIAGNOSIS — Z79.4 LONG-TERM INSULIN USE: ICD-10-CM

## 2021-03-30 DIAGNOSIS — K12.1 MOUTH ULCERS: Primary | ICD-10-CM

## 2021-03-30 DIAGNOSIS — I25.119 CORONARY ARTERY DISEASE INVOLVING NATIVE CORONARY ARTERY OF NATIVE HEART WITH ANGINA PECTORIS: ICD-10-CM

## 2021-03-30 PROCEDURE — 3288F PR FALLS RISK ASSESSMENT DOCUMENTED: ICD-10-PCS | Mod: CPTII,S$GLB,, | Performed by: INTERNAL MEDICINE

## 2021-03-30 PROCEDURE — 99214 OFFICE O/P EST MOD 30 MIN: CPT | Mod: S$GLB,,, | Performed by: INTERNAL MEDICINE

## 2021-03-30 PROCEDURE — 99214 PR OFFICE/OUTPT VISIT, EST, LEVL IV, 30-39 MIN: ICD-10-PCS | Mod: S$GLB,,, | Performed by: INTERNAL MEDICINE

## 2021-03-30 PROCEDURE — 1159F MED LIST DOCD IN RCRD: CPT | Mod: S$GLB,,, | Performed by: INTERNAL MEDICINE

## 2021-03-30 PROCEDURE — 99499 RISK ADDL DX/OHS AUDIT: ICD-10-PCS | Mod: S$GLB,,, | Performed by: INTERNAL MEDICINE

## 2021-03-30 PROCEDURE — 3074F SYST BP LT 130 MM HG: CPT | Mod: CPTII,S$GLB,, | Performed by: INTERNAL MEDICINE

## 2021-03-30 PROCEDURE — 1159F PR MEDICATION LIST DOCUMENTED IN MEDICAL RECORD: ICD-10-PCS | Mod: S$GLB,,, | Performed by: INTERNAL MEDICINE

## 2021-03-30 PROCEDURE — 3078F DIAST BP <80 MM HG: CPT | Mod: CPTII,S$GLB,, | Performed by: INTERNAL MEDICINE

## 2021-03-30 PROCEDURE — 3044F PR MOST RECENT HEMOGLOBIN A1C LEVEL <7.0%: ICD-10-PCS | Mod: CPTII,S$GLB,, | Performed by: INTERNAL MEDICINE

## 2021-03-30 PROCEDURE — 3008F PR BODY MASS INDEX (BMI) DOCUMENTED: ICD-10-PCS | Mod: CPTII,S$GLB,, | Performed by: INTERNAL MEDICINE

## 2021-03-30 PROCEDURE — 3044F HG A1C LEVEL LT 7.0%: CPT | Mod: CPTII,S$GLB,, | Performed by: INTERNAL MEDICINE

## 2021-03-30 PROCEDURE — 3078F PR MOST RECENT DIASTOLIC BLOOD PRESSURE < 80 MM HG: ICD-10-PCS | Mod: CPTII,S$GLB,, | Performed by: INTERNAL MEDICINE

## 2021-03-30 PROCEDURE — 1101F PR PT FALLS ASSESS DOC 0-1 FALLS W/OUT INJ PAST YR: ICD-10-PCS | Mod: CPTII,S$GLB,, | Performed by: INTERNAL MEDICINE

## 2021-03-30 PROCEDURE — 1101F PT FALLS ASSESS-DOCD LE1/YR: CPT | Mod: CPTII,S$GLB,, | Performed by: INTERNAL MEDICINE

## 2021-03-30 PROCEDURE — 99999 PR PBB SHADOW E&M-EST. PATIENT-LVL IV: ICD-10-PCS | Mod: PBBFAC,,, | Performed by: INTERNAL MEDICINE

## 2021-03-30 PROCEDURE — 3074F PR MOST RECENT SYSTOLIC BLOOD PRESSURE < 130 MM HG: ICD-10-PCS | Mod: CPTII,S$GLB,, | Performed by: INTERNAL MEDICINE

## 2021-03-30 PROCEDURE — 3008F BODY MASS INDEX DOCD: CPT | Mod: CPTII,S$GLB,, | Performed by: INTERNAL MEDICINE

## 2021-03-30 PROCEDURE — 99499 UNLISTED E&M SERVICE: CPT | Mod: S$GLB,,, | Performed by: INTERNAL MEDICINE

## 2021-03-30 PROCEDURE — 3288F FALL RISK ASSESSMENT DOCD: CPT | Mod: CPTII,S$GLB,, | Performed by: INTERNAL MEDICINE

## 2021-03-30 PROCEDURE — 99999 PR PBB SHADOW E&M-EST. PATIENT-LVL IV: CPT | Mod: PBBFAC,,, | Performed by: INTERNAL MEDICINE

## 2021-03-31 RX ORDER — AMLODIPINE BESYLATE 10 MG/1
TABLET ORAL
Qty: 30 TABLET | Refills: 12 | Status: SHIPPED | OUTPATIENT
Start: 2021-03-31 | End: 2022-02-09

## 2021-04-01 ENCOUNTER — LAB VISIT (OUTPATIENT)
Dept: LAB | Facility: HOSPITAL | Age: 71
End: 2021-04-01
Attending: INTERNAL MEDICINE
Payer: MEDICARE

## 2021-04-01 ENCOUNTER — ANTI-COAG VISIT (OUTPATIENT)
Dept: CARDIOLOGY | Facility: CLINIC | Age: 71
End: 2021-04-01
Payer: MEDICARE

## 2021-04-01 DIAGNOSIS — Z79.01 LONG TERM (CURRENT) USE OF ANTICOAGULANTS: ICD-10-CM

## 2021-04-01 DIAGNOSIS — I48.11 LONGSTANDING PERSISTENT ATRIAL FIBRILLATION: Primary | ICD-10-CM

## 2021-04-01 DIAGNOSIS — I48.11 LONGSTANDING PERSISTENT ATRIAL FIBRILLATION: ICD-10-CM

## 2021-04-01 LAB
INR PPP: 2.4 (ref 0.8–1.2)
PROTHROMBIN TIME: 24.1 SEC (ref 9–12.5)

## 2021-04-01 PROCEDURE — 36415 COLL VENOUS BLD VENIPUNCTURE: CPT | Mod: PO | Performed by: INTERNAL MEDICINE

## 2021-04-01 PROCEDURE — 93793 ANTICOAG MGMT PT WARFARIN: CPT | Mod: S$GLB,,,

## 2021-04-01 PROCEDURE — 93793 PR ANTICOAGULANT MGMT FOR PT TAKING WARFARIN: ICD-10-PCS | Mod: S$GLB,,,

## 2021-04-01 PROCEDURE — 85610 PROTHROMBIN TIME: CPT | Performed by: INTERNAL MEDICINE

## 2021-04-12 ENCOUNTER — PATIENT MESSAGE (OUTPATIENT)
Dept: ADMINISTRATIVE | Facility: HOSPITAL | Age: 71
End: 2021-04-12

## 2021-04-12 ENCOUNTER — TELEPHONE (OUTPATIENT)
Dept: FAMILY MEDICINE | Facility: CLINIC | Age: 71
End: 2021-04-12

## 2021-04-13 ENCOUNTER — PATIENT MESSAGE (OUTPATIENT)
Dept: FAMILY MEDICINE | Facility: CLINIC | Age: 71
End: 2021-04-13

## 2021-04-16 ENCOUNTER — PATIENT MESSAGE (OUTPATIENT)
Dept: RESEARCH | Facility: HOSPITAL | Age: 71
End: 2021-04-16

## 2021-04-21 ENCOUNTER — ANTI-COAG VISIT (OUTPATIENT)
Dept: CARDIOLOGY | Facility: CLINIC | Age: 71
End: 2021-04-21
Payer: MEDICARE

## 2021-04-21 ENCOUNTER — LAB VISIT (OUTPATIENT)
Dept: LAB | Facility: HOSPITAL | Age: 71
End: 2021-04-21
Attending: INTERNAL MEDICINE
Payer: MEDICARE

## 2021-04-21 DIAGNOSIS — Z79.01 LONG TERM (CURRENT) USE OF ANTICOAGULANTS: ICD-10-CM

## 2021-04-21 DIAGNOSIS — I48.11 LONGSTANDING PERSISTENT ATRIAL FIBRILLATION: ICD-10-CM

## 2021-04-21 DIAGNOSIS — I48.11 LONGSTANDING PERSISTENT ATRIAL FIBRILLATION: Primary | ICD-10-CM

## 2021-04-21 LAB
INR PPP: 2 (ref 0.8–1.2)
PROTHROMBIN TIME: 20.1 SEC (ref 9–12.5)

## 2021-04-21 PROCEDURE — 93793 ANTICOAG MGMT PT WARFARIN: CPT | Mod: S$GLB,,,

## 2021-04-21 PROCEDURE — 85610 PROTHROMBIN TIME: CPT | Performed by: INTERNAL MEDICINE

## 2021-04-21 PROCEDURE — 93793 PR ANTICOAGULANT MGMT FOR PT TAKING WARFARIN: ICD-10-PCS | Mod: S$GLB,,,

## 2021-04-21 PROCEDURE — 36415 COLL VENOUS BLD VENIPUNCTURE: CPT | Mod: PO | Performed by: INTERNAL MEDICINE

## 2021-05-10 ENCOUNTER — ANTI-COAG VISIT (OUTPATIENT)
Dept: CARDIOLOGY | Facility: CLINIC | Age: 71
End: 2021-05-10
Payer: MEDICARE

## 2021-05-10 ENCOUNTER — OFFICE VISIT (OUTPATIENT)
Dept: CARDIOLOGY | Facility: CLINIC | Age: 71
End: 2021-05-10
Payer: MEDICARE

## 2021-05-10 ENCOUNTER — LAB VISIT (OUTPATIENT)
Dept: LAB | Facility: HOSPITAL | Age: 71
End: 2021-05-10
Attending: INTERNAL MEDICINE
Payer: MEDICARE

## 2021-05-10 VITALS
HEIGHT: 67 IN | WEIGHT: 194.88 LBS | OXYGEN SATURATION: 99 % | BODY MASS INDEX: 30.59 KG/M2 | DIASTOLIC BLOOD PRESSURE: 80 MMHG | SYSTOLIC BLOOD PRESSURE: 130 MMHG | HEART RATE: 47 BPM

## 2021-05-10 DIAGNOSIS — I70.0 ABDOMINAL AORTIC ATHEROSCLEROSIS: ICD-10-CM

## 2021-05-10 DIAGNOSIS — E55.9 HYPOVITAMINOSIS D: ICD-10-CM

## 2021-05-10 DIAGNOSIS — Z95.1 HX OF CABG: ICD-10-CM

## 2021-05-10 DIAGNOSIS — I10 ESSENTIAL HYPERTENSION: Primary | ICD-10-CM

## 2021-05-10 DIAGNOSIS — I48.11 LONGSTANDING PERSISTENT ATRIAL FIBRILLATION: ICD-10-CM

## 2021-05-10 DIAGNOSIS — Z79.01 LONG TERM (CURRENT) USE OF ANTICOAGULANTS: ICD-10-CM

## 2021-05-10 DIAGNOSIS — I48.11 LONGSTANDING PERSISTENT ATRIAL FIBRILLATION: Primary | ICD-10-CM

## 2021-05-10 DIAGNOSIS — E78.5 HYPERLIPIDEMIA, UNSPECIFIED HYPERLIPIDEMIA TYPE: ICD-10-CM

## 2021-05-10 DIAGNOSIS — R07.89 CHEST PAIN, ATYPICAL: ICD-10-CM

## 2021-05-10 LAB
25(OH)D3+25(OH)D2 SERPL-MCNC: 29 NG/ML (ref 30–96)
CREAT SERPL-MCNC: 1.3 MG/DL (ref 0.5–1.4)
EST. GFR  (AFRICAN AMERICAN): >60 ML/MIN/1.73 M^2
EST. GFR  (NON AFRICAN AMERICAN): 55 ML/MIN/1.73 M^2
ESTIMATED AVG GLUCOSE: 148 MG/DL (ref 68–131)
HBA1C MFR BLD: 6.8 % (ref 4–5.6)
INR PPP: 2.9 (ref 0.8–1.2)
PROTHROMBIN TIME: 28.6 SEC (ref 9–12.5)

## 2021-05-10 PROCEDURE — 83036 HEMOGLOBIN GLYCOSYLATED A1C: CPT | Performed by: NURSE PRACTITIONER

## 2021-05-10 PROCEDURE — 1126F AMNT PAIN NOTED NONE PRSNT: CPT | Mod: S$GLB,,, | Performed by: INTERNAL MEDICINE

## 2021-05-10 PROCEDURE — 3288F FALL RISK ASSESSMENT DOCD: CPT | Mod: CPTII,S$GLB,, | Performed by: INTERNAL MEDICINE

## 2021-05-10 PROCEDURE — 3075F PR MOST RECENT SYSTOLIC BLOOD PRESS GE 130-139MM HG: ICD-10-PCS | Mod: CPTII,S$GLB,, | Performed by: INTERNAL MEDICINE

## 2021-05-10 PROCEDURE — 3008F BODY MASS INDEX DOCD: CPT | Mod: CPTII,S$GLB,, | Performed by: INTERNAL MEDICINE

## 2021-05-10 PROCEDURE — 99999 PR PBB SHADOW E&M-EST. PATIENT-LVL V: ICD-10-PCS | Mod: PBBFAC,,, | Performed by: INTERNAL MEDICINE

## 2021-05-10 PROCEDURE — 1101F PT FALLS ASSESS-DOCD LE1/YR: CPT | Mod: CPTII,S$GLB,, | Performed by: INTERNAL MEDICINE

## 2021-05-10 PROCEDURE — 93000 EKG 12-LEAD: ICD-10-PCS | Mod: S$GLB,,, | Performed by: INTERNAL MEDICINE

## 2021-05-10 PROCEDURE — 36415 COLL VENOUS BLD VENIPUNCTURE: CPT | Mod: PO | Performed by: INTERNAL MEDICINE

## 2021-05-10 PROCEDURE — 3008F PR BODY MASS INDEX (BMI) DOCUMENTED: ICD-10-PCS | Mod: CPTII,S$GLB,, | Performed by: INTERNAL MEDICINE

## 2021-05-10 PROCEDURE — 99214 OFFICE O/P EST MOD 30 MIN: CPT | Mod: S$GLB,,, | Performed by: INTERNAL MEDICINE

## 2021-05-10 PROCEDURE — 82565 ASSAY OF CREATININE: CPT | Performed by: NURSE PRACTITIONER

## 2021-05-10 PROCEDURE — 85610 PROTHROMBIN TIME: CPT | Performed by: INTERNAL MEDICINE

## 2021-05-10 PROCEDURE — 1101F PR PT FALLS ASSESS DOC 0-1 FALLS W/OUT INJ PAST YR: ICD-10-PCS | Mod: CPTII,S$GLB,, | Performed by: INTERNAL MEDICINE

## 2021-05-10 PROCEDURE — 1126F PR PAIN SEVERITY QUANTIFIED, NO PAIN PRESENT: ICD-10-PCS | Mod: S$GLB,,, | Performed by: INTERNAL MEDICINE

## 2021-05-10 PROCEDURE — 99999 PR PBB SHADOW E&M-EST. PATIENT-LVL V: CPT | Mod: PBBFAC,,, | Performed by: INTERNAL MEDICINE

## 2021-05-10 PROCEDURE — 1159F MED LIST DOCD IN RCRD: CPT | Mod: S$GLB,,, | Performed by: INTERNAL MEDICINE

## 2021-05-10 PROCEDURE — 1159F PR MEDICATION LIST DOCUMENTED IN MEDICAL RECORD: ICD-10-PCS | Mod: S$GLB,,, | Performed by: INTERNAL MEDICINE

## 2021-05-10 PROCEDURE — 82306 VITAMIN D 25 HYDROXY: CPT | Performed by: NURSE PRACTITIONER

## 2021-05-10 PROCEDURE — 3079F PR MOST RECENT DIASTOLIC BLOOD PRESSURE 80-89 MM HG: ICD-10-PCS | Mod: CPTII,S$GLB,, | Performed by: INTERNAL MEDICINE

## 2021-05-10 PROCEDURE — 3288F PR FALLS RISK ASSESSMENT DOCUMENTED: ICD-10-PCS | Mod: CPTII,S$GLB,, | Performed by: INTERNAL MEDICINE

## 2021-05-10 PROCEDURE — 3075F SYST BP GE 130 - 139MM HG: CPT | Mod: CPTII,S$GLB,, | Performed by: INTERNAL MEDICINE

## 2021-05-10 PROCEDURE — 3079F DIAST BP 80-89 MM HG: CPT | Mod: CPTII,S$GLB,, | Performed by: INTERNAL MEDICINE

## 2021-05-10 PROCEDURE — 93000 ELECTROCARDIOGRAM COMPLETE: CPT | Mod: S$GLB,,, | Performed by: INTERNAL MEDICINE

## 2021-05-10 PROCEDURE — 99214 PR OFFICE/OUTPT VISIT, EST, LEVL IV, 30-39 MIN: ICD-10-PCS | Mod: S$GLB,,, | Performed by: INTERNAL MEDICINE

## 2021-05-13 ENCOUNTER — OFFICE VISIT (OUTPATIENT)
Dept: ENDOCRINOLOGY | Facility: CLINIC | Age: 71
End: 2021-05-13
Payer: MEDICARE

## 2021-05-13 VITALS
WEIGHT: 194.69 LBS | BODY MASS INDEX: 30.49 KG/M2 | DIASTOLIC BLOOD PRESSURE: 67 MMHG | TEMPERATURE: 98 F | HEART RATE: 61 BPM | SYSTOLIC BLOOD PRESSURE: 153 MMHG

## 2021-05-13 DIAGNOSIS — I25.10 CORONARY ARTERY DISEASE INVOLVING NATIVE CORONARY ARTERY OF NATIVE HEART WITHOUT ANGINA PECTORIS: ICD-10-CM

## 2021-05-13 DIAGNOSIS — E55.9 HYPOVITAMINOSIS D: ICD-10-CM

## 2021-05-13 PROCEDURE — 3008F BODY MASS INDEX DOCD: CPT | Mod: CPTII,S$GLB,, | Performed by: NURSE PRACTITIONER

## 2021-05-13 PROCEDURE — 99214 PR OFFICE/OUTPT VISIT, EST, LEVL IV, 30-39 MIN: ICD-10-PCS | Mod: S$GLB,,, | Performed by: NURSE PRACTITIONER

## 2021-05-13 PROCEDURE — 1126F PR PAIN SEVERITY QUANTIFIED, NO PAIN PRESENT: ICD-10-PCS | Mod: S$GLB,,, | Performed by: NURSE PRACTITIONER

## 2021-05-13 PROCEDURE — 1159F MED LIST DOCD IN RCRD: CPT | Mod: S$GLB,,, | Performed by: NURSE PRACTITIONER

## 2021-05-13 PROCEDURE — 1159F PR MEDICATION LIST DOCUMENTED IN MEDICAL RECORD: ICD-10-PCS | Mod: S$GLB,,, | Performed by: NURSE PRACTITIONER

## 2021-05-13 PROCEDURE — 1126F AMNT PAIN NOTED NONE PRSNT: CPT | Mod: S$GLB,,, | Performed by: NURSE PRACTITIONER

## 2021-05-13 PROCEDURE — 99999 PR PBB SHADOW E&M-EST. PATIENT-LVL V: ICD-10-PCS | Mod: PBBFAC,,, | Performed by: NURSE PRACTITIONER

## 2021-05-13 PROCEDURE — 3044F HG A1C LEVEL LT 7.0%: CPT | Mod: CPTII,S$GLB,, | Performed by: NURSE PRACTITIONER

## 2021-05-13 PROCEDURE — 99999 PR PBB SHADOW E&M-EST. PATIENT-LVL V: CPT | Mod: PBBFAC,,, | Performed by: NURSE PRACTITIONER

## 2021-05-13 PROCEDURE — 99214 OFFICE O/P EST MOD 30 MIN: CPT | Mod: S$GLB,,, | Performed by: NURSE PRACTITIONER

## 2021-05-13 PROCEDURE — 3044F PR MOST RECENT HEMOGLOBIN A1C LEVEL <7.0%: ICD-10-PCS | Mod: CPTII,S$GLB,, | Performed by: NURSE PRACTITIONER

## 2021-05-13 PROCEDURE — 3008F PR BODY MASS INDEX (BMI) DOCUMENTED: ICD-10-PCS | Mod: CPTII,S$GLB,, | Performed by: NURSE PRACTITIONER

## 2021-05-19 ENCOUNTER — HOSPITAL ENCOUNTER (OUTPATIENT)
Dept: CARDIOLOGY | Facility: HOSPITAL | Age: 71
Discharge: HOME | End: 2021-05-19
Attending: INTERNAL MEDICINE
Payer: MEDICARE

## 2021-05-19 ENCOUNTER — HOSPITAL ENCOUNTER (OUTPATIENT)
Dept: RADIOLOGY | Facility: HOSPITAL | Age: 71
Discharge: HOME | End: 2021-05-19
Attending: INTERNAL MEDICINE
Payer: MEDICARE

## 2021-05-19 DIAGNOSIS — R07.89 CHEST PAIN, ATYPICAL: ICD-10-CM

## 2021-05-19 DIAGNOSIS — I48.11 LONGSTANDING PERSISTENT ATRIAL FIBRILLATION: ICD-10-CM

## 2021-05-19 DIAGNOSIS — I10 ESSENTIAL HYPERTENSION: ICD-10-CM

## 2021-05-19 DIAGNOSIS — Z95.1 HX OF CABG: ICD-10-CM

## 2021-05-19 DIAGNOSIS — I70.0 ABDOMINAL AORTIC ATHEROSCLEROSIS: ICD-10-CM

## 2021-05-19 DIAGNOSIS — E78.5 HYPERLIPIDEMIA, UNSPECIFIED HYPERLIPIDEMIA TYPE: ICD-10-CM

## 2021-05-19 LAB
AORTIC ROOT ANNULUS: 2.88 CM
AORTIC VALVE CUSP SEPERATION: 1.98 CM
ASCENDING AORTA: 2.92 CM
AV INDEX (PROSTH): 0.61
AV MEAN GRADIENT: 4 MMHG
AV PEAK GRADIENT: 8 MMHG
AV VALVE AREA: 2.12 CM2
AV VELOCITY RATIO: 0.62
CV ECHO LV RWT: 0.8 CM
CV STRESS BASE HR: 48 BPM
DIASTOLIC BLOOD PRESSURE: 67 MMHG
DOP CALC AO PEAK VEL: 1.37 M/S
DOP CALC AO VTI: 34.9 CM
DOP CALC LVOT AREA: 3.5 CM2
DOP CALC LVOT DIAMETER: 2.11 CM
DOP CALC LVOT PEAK VEL: 0.85 M/S
DOP CALC LVOT STROKE VOLUME: 74.16 CM3
DOP CALCLVOT PEAK VEL VTI: 21.22 CM
ECHO LV POSTERIOR WALL: 1.61 CM (ref 0.6–1.1)
EJECTION FRACTION: 60 %
FRACTIONAL SHORTENING: 37 % (ref 28–44)
INTERVENTRICULAR SEPTUM: 1.43 CM (ref 0.6–1.1)
LA MAJOR: 5.18 CM
LA MINOR: 5.94 CM
LA WIDTH: 3.77 CM
LEFT ATRIUM SIZE: 4.97 CM
LEFT ATRIUM VOLUME: 88.14 CM3
LEFT INTERNAL DIMENSION IN SYSTOLE: 2.52 CM (ref 2.1–4)
LEFT VENTRICLE DIASTOLIC VOLUME: 71.09 ML
LEFT VENTRICLE SYSTOLIC VOLUME: 22.88 ML
LEFT VENTRICULAR INTERNAL DIMENSION IN DIASTOLE: 4.03 CM (ref 3.5–6)
LEFT VENTRICULAR MASS: 240.17 G
MV PEAK E VEL: 1.52 M/S
MV STENOSIS PRESSURE HALF TIME: 109.87 MS
MV VALVE AREA P 1/2 METHOD: 2 CM2
NUC STRESS EJECTION FRACTION: 61 %
OHS CV CPX 85 PERCENT MAX PREDICTED HEART RATE MALE: 127
OHS CV CPX MAX PREDICTED HEART RATE: 149
OHS CV CPX PATIENT IS FEMALE: 0
OHS CV CPX PATIENT IS MALE: 1
OHS CV CPX PEAK DIASTOLIC BLOOD PRESSURE: 88 MMHG
OHS CV CPX PEAK HEAR RATE: 61 BPM
OHS CV CPX PEAK RATE PRESSURE PRODUCT: 7808
OHS CV CPX PEAK SYSTOLIC BLOOD PRESSURE: 128 MMHG
OHS CV CPX PERCENT MAX PREDICTED HEART RATE ACHIEVED: 41
OHS CV CPX RATE PRESSURE PRODUCT PRESENTING: 8160
PISA MRMAX VEL: 0.05 M/S
PISA TR MAX VEL: 1.62 M/S
PV PEAK VELOCITY: 1.4 CM/S
RA MAJOR: 4.94 CM
RA PRESSURE: 3 MMHG
RA WIDTH: 3.33 CM
RIGHT VENTRICULAR END-DIASTOLIC DIMENSION: 3.41 CM
SINUS: 2.63 CM
STJ: 1.97 CM
SYSTOLIC BLOOD PRESSURE: 170 MMHG
TR MAX PG: 10 MMHG
TRICUSPID ANNULAR PLANE SYSTOLIC EXCURSION: 1.52 CM
TV REST PULMONARY ARTERY PRESSURE: 13 MMHG

## 2021-05-19 PROCEDURE — 93227 HOLTER MONITOR - 24 HOUR (CUPID ONLY): ICD-10-PCS | Mod: ,,, | Performed by: INTERNAL MEDICINE

## 2021-05-19 PROCEDURE — 93016 STRESS TEST WITH MYOCARDIAL PERFUSION (CUPID ONLY): ICD-10-PCS | Mod: ,,, | Performed by: INTERNAL MEDICINE

## 2021-05-19 PROCEDURE — 93016 CV STRESS TEST SUPVJ ONLY: CPT | Mod: ,,, | Performed by: INTERNAL MEDICINE

## 2021-05-19 PROCEDURE — 93227 XTRNL ECG REC<48 HR R&I: CPT | Mod: ,,, | Performed by: INTERNAL MEDICINE

## 2021-05-19 PROCEDURE — 78452 STRESS TEST WITH MYOCARDIAL PERFUSION (CUPID ONLY): ICD-10-PCS | Mod: 26,,, | Performed by: INTERNAL MEDICINE

## 2021-05-19 PROCEDURE — A9502 TC99M TETROFOSMIN: HCPCS

## 2021-05-19 PROCEDURE — 93018 CV STRESS TEST I&R ONLY: CPT | Mod: ,,, | Performed by: INTERNAL MEDICINE

## 2021-05-19 PROCEDURE — 78452 HT MUSCLE IMAGE SPECT MULT: CPT

## 2021-05-19 PROCEDURE — 93306 ECHO (CUPID ONLY): ICD-10-PCS | Mod: 26,,, | Performed by: INTERNAL MEDICINE

## 2021-05-19 PROCEDURE — 93306 TTE W/DOPPLER COMPLETE: CPT

## 2021-05-19 PROCEDURE — 78452 HT MUSCLE IMAGE SPECT MULT: CPT | Mod: 26,,, | Performed by: INTERNAL MEDICINE

## 2021-05-19 PROCEDURE — 93017 CV STRESS TEST TRACING ONLY: CPT

## 2021-05-19 PROCEDURE — 63600175 PHARM REV CODE 636 W HCPCS: Performed by: INTERNAL MEDICINE

## 2021-05-19 PROCEDURE — 93306 TTE W/DOPPLER COMPLETE: CPT | Mod: 26,,, | Performed by: INTERNAL MEDICINE

## 2021-05-19 PROCEDURE — 93225 XTRNL ECG REC<48 HRS REC: CPT

## 2021-05-19 PROCEDURE — 93018 STRESS TEST WITH MYOCARDIAL PERFUSION (CUPID ONLY): ICD-10-PCS | Mod: ,,, | Performed by: INTERNAL MEDICINE

## 2021-05-19 RX ORDER — REGADENOSON 0.08 MG/ML
0.4 INJECTION, SOLUTION INTRAVENOUS ONCE
Status: COMPLETED | OUTPATIENT
Start: 2021-05-19 | End: 2021-05-19

## 2021-05-19 RX ADMIN — REGADENOSON 0.4 MG: 0.08 INJECTION, SOLUTION INTRAVENOUS at 09:05

## 2021-05-20 LAB
OHS CV EVENT MONITOR DAY: 0
OHS CV HOLTER LENGTH DECIMAL HOURS: 23.98
OHS CV HOLTER LENGTH HOURS: 23
OHS CV HOLTER LENGTH MINUTES: 59

## 2021-05-25 ENCOUNTER — OFFICE VISIT (OUTPATIENT)
Dept: CARDIOLOGY | Facility: CLINIC | Age: 71
End: 2021-05-25
Payer: MEDICARE

## 2021-05-25 VITALS
SYSTOLIC BLOOD PRESSURE: 122 MMHG | BODY MASS INDEX: 30.45 KG/M2 | DIASTOLIC BLOOD PRESSURE: 64 MMHG | OXYGEN SATURATION: 98 % | HEIGHT: 67 IN | HEART RATE: 61 BPM | WEIGHT: 194 LBS

## 2021-05-25 DIAGNOSIS — R07.89 CHEST PAIN, ATYPICAL: ICD-10-CM

## 2021-05-25 DIAGNOSIS — I10 ESSENTIAL HYPERTENSION: ICD-10-CM

## 2021-05-25 DIAGNOSIS — I48.11 LONGSTANDING PERSISTENT ATRIAL FIBRILLATION: ICD-10-CM

## 2021-05-25 DIAGNOSIS — E78.5 HYPERLIPIDEMIA, UNSPECIFIED HYPERLIPIDEMIA TYPE: ICD-10-CM

## 2021-05-25 DIAGNOSIS — I25.10 CORONARY ARTERY DISEASE INVOLVING NATIVE CORONARY ARTERY OF NATIVE HEART WITHOUT ANGINA PECTORIS: ICD-10-CM

## 2021-05-25 DIAGNOSIS — Z95.1 HX OF CABG: Primary | ICD-10-CM

## 2021-05-25 PROCEDURE — 3008F PR BODY MASS INDEX (BMI) DOCUMENTED: ICD-10-PCS | Mod: CPTII,S$GLB,, | Performed by: INTERNAL MEDICINE

## 2021-05-25 PROCEDURE — 1126F PR PAIN SEVERITY QUANTIFIED, NO PAIN PRESENT: ICD-10-PCS | Mod: S$GLB,,, | Performed by: INTERNAL MEDICINE

## 2021-05-25 PROCEDURE — 1126F AMNT PAIN NOTED NONE PRSNT: CPT | Mod: S$GLB,,, | Performed by: INTERNAL MEDICINE

## 2021-05-25 PROCEDURE — 3288F FALL RISK ASSESSMENT DOCD: CPT | Mod: CPTII,S$GLB,, | Performed by: INTERNAL MEDICINE

## 2021-05-25 PROCEDURE — 99999 PR PBB SHADOW E&M-EST. PATIENT-LVL V: ICD-10-PCS | Mod: PBBFAC,,, | Performed by: INTERNAL MEDICINE

## 2021-05-25 PROCEDURE — 99214 OFFICE O/P EST MOD 30 MIN: CPT | Mod: S$GLB,,, | Performed by: INTERNAL MEDICINE

## 2021-05-25 PROCEDURE — 1159F PR MEDICATION LIST DOCUMENTED IN MEDICAL RECORD: ICD-10-PCS | Mod: S$GLB,,, | Performed by: INTERNAL MEDICINE

## 2021-05-25 PROCEDURE — 3008F BODY MASS INDEX DOCD: CPT | Mod: CPTII,S$GLB,, | Performed by: INTERNAL MEDICINE

## 2021-05-25 PROCEDURE — 3288F PR FALLS RISK ASSESSMENT DOCUMENTED: ICD-10-PCS | Mod: CPTII,S$GLB,, | Performed by: INTERNAL MEDICINE

## 2021-05-25 PROCEDURE — 99999 PR PBB SHADOW E&M-EST. PATIENT-LVL V: CPT | Mod: PBBFAC,,, | Performed by: INTERNAL MEDICINE

## 2021-05-25 PROCEDURE — 1101F PR PT FALLS ASSESS DOC 0-1 FALLS W/OUT INJ PAST YR: ICD-10-PCS | Mod: CPTII,S$GLB,, | Performed by: INTERNAL MEDICINE

## 2021-05-25 PROCEDURE — 1159F MED LIST DOCD IN RCRD: CPT | Mod: S$GLB,,, | Performed by: INTERNAL MEDICINE

## 2021-05-25 PROCEDURE — 99214 PR OFFICE/OUTPT VISIT, EST, LEVL IV, 30-39 MIN: ICD-10-PCS | Mod: S$GLB,,, | Performed by: INTERNAL MEDICINE

## 2021-05-25 PROCEDURE — 1101F PT FALLS ASSESS-DOCD LE1/YR: CPT | Mod: CPTII,S$GLB,, | Performed by: INTERNAL MEDICINE

## 2021-05-26 DIAGNOSIS — E78.5 HYPERLIPIDEMIA, UNSPECIFIED HYPERLIPIDEMIA TYPE: Primary | ICD-10-CM

## 2021-05-26 DIAGNOSIS — E78.2 MIXED HYPERLIPIDEMIA: ICD-10-CM

## 2021-05-26 RX ORDER — OMEGA-3-ACID ETHYL ESTERS 1 G/1
2 CAPSULE, LIQUID FILLED ORAL 2 TIMES DAILY
Qty: 120 CAPSULE | Refills: 12 | Status: SHIPPED | OUTPATIENT
Start: 2021-05-26 | End: 2022-06-13

## 2021-06-04 ENCOUNTER — OFFICE VISIT (OUTPATIENT)
Dept: PAIN MEDICINE | Facility: CLINIC | Age: 71
End: 2021-06-04
Payer: MEDICARE

## 2021-06-04 VITALS
OXYGEN SATURATION: 99 % | SYSTOLIC BLOOD PRESSURE: 121 MMHG | HEIGHT: 67 IN | HEART RATE: 42 BPM | DIASTOLIC BLOOD PRESSURE: 58 MMHG | BODY MASS INDEX: 30.39 KG/M2

## 2021-06-04 DIAGNOSIS — M47.812 CERVICAL SPONDYLOSIS: ICD-10-CM

## 2021-06-04 DIAGNOSIS — M54.12 CERVICAL RADICULOPATHY: ICD-10-CM

## 2021-06-04 DIAGNOSIS — M50.30 DDD (DEGENERATIVE DISC DISEASE), CERVICAL: Primary | ICD-10-CM

## 2021-06-04 PROCEDURE — 1125F AMNT PAIN NOTED PAIN PRSNT: CPT | Mod: S$GLB,,, | Performed by: PAIN MEDICINE

## 2021-06-04 PROCEDURE — 99999 PR PBB SHADOW E&M-EST. PATIENT-LVL V: ICD-10-PCS | Mod: PBBFAC,,, | Performed by: PAIN MEDICINE

## 2021-06-04 PROCEDURE — 3008F BODY MASS INDEX DOCD: CPT | Mod: CPTII,S$GLB,, | Performed by: PAIN MEDICINE

## 2021-06-04 PROCEDURE — 1125F PR PAIN SEVERITY QUANTIFIED, PAIN PRESENT: ICD-10-PCS | Mod: S$GLB,,, | Performed by: PAIN MEDICINE

## 2021-06-04 PROCEDURE — 1159F MED LIST DOCD IN RCRD: CPT | Mod: S$GLB,,, | Performed by: PAIN MEDICINE

## 2021-06-04 PROCEDURE — 99214 PR OFFICE/OUTPT VISIT, EST, LEVL IV, 30-39 MIN: ICD-10-PCS | Mod: S$GLB,,, | Performed by: PAIN MEDICINE

## 2021-06-04 PROCEDURE — 99214 OFFICE O/P EST MOD 30 MIN: CPT | Mod: S$GLB,,, | Performed by: PAIN MEDICINE

## 2021-06-04 PROCEDURE — 3008F PR BODY MASS INDEX (BMI) DOCUMENTED: ICD-10-PCS | Mod: CPTII,S$GLB,, | Performed by: PAIN MEDICINE

## 2021-06-04 PROCEDURE — 99999 PR PBB SHADOW E&M-EST. PATIENT-LVL V: CPT | Mod: PBBFAC,,, | Performed by: PAIN MEDICINE

## 2021-06-04 PROCEDURE — 1159F PR MEDICATION LIST DOCUMENTED IN MEDICAL RECORD: ICD-10-PCS | Mod: S$GLB,,, | Performed by: PAIN MEDICINE

## 2021-06-04 RX ORDER — LIDOCAINE HYDROCHLORIDE 20 MG/ML
SOLUTION ORAL; TOPICAL
COMMUNITY
Start: 2021-04-16 | End: 2022-03-21

## 2021-06-04 RX ORDER — METOPROLOL SUCCINATE 50 MG/1
TABLET, EXTENDED RELEASE ORAL
COMMUNITY
Start: 2021-02-27 | End: 2022-02-16 | Stop reason: CLARIF

## 2021-06-04 RX ORDER — INFLUENZA A VIRUS A/MICHIGAN/45/2015 X-275 (H1N1) ANTIGEN (FORMALDEHYDE INACTIVATED), INFLUENZA A VIRUS A/SINGAPORE/INFIMH-16-0019/2016 IVR-186 (H3N2) ANTIGEN (FORMALDEHYDE INACTIVATED), INFLUENZA B VIRUS B/PHUKET/3073/2013 ANTIGEN (FORMALDEHYDE INACTIVATED), AND INFLUENZA B VIRUS B/MARYLAND/15/2016 BX-69A ANTIGEN (FORMALDEHYDE INACTIVATED) 60; 60; 60; 60 UG/.7ML; UG/.7ML; UG/.7ML; UG/.7ML
INJECTION, SUSPENSION INTRAMUSCULAR
COMMUNITY
Start: 2020-12-30 | End: 2022-02-16 | Stop reason: CLARIF

## 2021-06-04 RX ORDER — DIPHENHYDRAMINE HYDROCHLORIDE 12.5 MG/5ML
LIQUID ORAL
COMMUNITY
Start: 2021-04-10 | End: 2022-03-21

## 2021-06-07 ENCOUNTER — TELEPHONE (OUTPATIENT)
Dept: PAIN MEDICINE | Facility: CLINIC | Age: 71
End: 2021-06-07

## 2021-06-14 RX ORDER — GLIMEPIRIDE 2 MG/1
2 TABLET ORAL
Qty: 90 TABLET | Refills: 0 | Status: SHIPPED | OUTPATIENT
Start: 2021-06-14 | End: 2021-07-28

## 2021-06-14 RX ORDER — METFORMIN HYDROCHLORIDE 500 MG/1
TABLET, EXTENDED RELEASE ORAL
Qty: 360 TABLET | Refills: 2 | Status: SHIPPED | OUTPATIENT
Start: 2021-06-14 | End: 2022-02-08

## 2021-06-24 ENCOUNTER — PATIENT MESSAGE (OUTPATIENT)
Dept: ADMINISTRATIVE | Facility: HOSPITAL | Age: 71
End: 2021-06-24

## 2021-06-24 ENCOUNTER — PATIENT OUTREACH (OUTPATIENT)
Dept: ADMINISTRATIVE | Facility: HOSPITAL | Age: 71
End: 2021-06-24

## 2021-06-24 ENCOUNTER — CLINICAL SUPPORT (OUTPATIENT)
Dept: REHABILITATION | Facility: HOSPITAL | Age: 71
End: 2021-06-24
Attending: PAIN MEDICINE
Payer: MEDICARE

## 2021-06-24 DIAGNOSIS — M54.12 CERVICAL RADICULOPATHY: ICD-10-CM

## 2021-06-24 DIAGNOSIS — M47.812 CERVICAL SPONDYLOSIS: ICD-10-CM

## 2021-06-24 DIAGNOSIS — M50.30 DDD (DEGENERATIVE DISC DISEASE), CERVICAL: ICD-10-CM

## 2021-06-24 DIAGNOSIS — G89.29 CHRONIC LEFT SHOULDER PAIN: Primary | ICD-10-CM

## 2021-06-24 DIAGNOSIS — M25.512 CHRONIC LEFT SHOULDER PAIN: Primary | ICD-10-CM

## 2021-06-24 PROCEDURE — 97110 THERAPEUTIC EXERCISES: CPT | Mod: PN

## 2021-06-24 PROCEDURE — 97161 PT EVAL LOW COMPLEX 20 MIN: CPT | Mod: PN

## 2021-06-25 ENCOUNTER — PES CALL (OUTPATIENT)
Dept: ADMINISTRATIVE | Facility: CLINIC | Age: 71
End: 2021-06-25

## 2021-07-07 ENCOUNTER — OFFICE VISIT (OUTPATIENT)
Dept: FAMILY MEDICINE | Facility: CLINIC | Age: 71
End: 2021-07-07
Payer: MEDICARE

## 2021-07-07 VITALS
DIASTOLIC BLOOD PRESSURE: 60 MMHG | WEIGHT: 190.25 LBS | HEART RATE: 58 BPM | TEMPERATURE: 99 F | HEIGHT: 67 IN | OXYGEN SATURATION: 97 % | BODY MASS INDEX: 29.86 KG/M2 | SYSTOLIC BLOOD PRESSURE: 130 MMHG

## 2021-07-07 DIAGNOSIS — I10 ESSENTIAL HYPERTENSION: ICD-10-CM

## 2021-07-07 DIAGNOSIS — M54.12 CERVICAL RADICULITIS: Primary | ICD-10-CM

## 2021-07-07 DIAGNOSIS — E11.9 DM TYPE 2 WITHOUT RETINOPATHY: ICD-10-CM

## 2021-07-07 DIAGNOSIS — N18.30 STAGE 3 CHRONIC KIDNEY DISEASE, UNSPECIFIED WHETHER STAGE 3A OR 3B CKD: ICD-10-CM

## 2021-07-07 PROCEDURE — 3044F PR MOST RECENT HEMOGLOBIN A1C LEVEL <7.0%: ICD-10-PCS | Mod: CPTII,S$GLB,, | Performed by: INTERNAL MEDICINE

## 2021-07-07 PROCEDURE — 99499 RISK ADDL DX/OHS AUDIT: ICD-10-PCS | Mod: S$GLB,,, | Performed by: INTERNAL MEDICINE

## 2021-07-07 PROCEDURE — 3008F PR BODY MASS INDEX (BMI) DOCUMENTED: ICD-10-PCS | Mod: CPTII,S$GLB,, | Performed by: INTERNAL MEDICINE

## 2021-07-07 PROCEDURE — 99214 OFFICE O/P EST MOD 30 MIN: CPT | Mod: S$GLB,,, | Performed by: INTERNAL MEDICINE

## 2021-07-07 PROCEDURE — 3288F FALL RISK ASSESSMENT DOCD: CPT | Mod: CPTII,S$GLB,, | Performed by: INTERNAL MEDICINE

## 2021-07-07 PROCEDURE — 3044F HG A1C LEVEL LT 7.0%: CPT | Mod: CPTII,S$GLB,, | Performed by: INTERNAL MEDICINE

## 2021-07-07 PROCEDURE — 3288F PR FALLS RISK ASSESSMENT DOCUMENTED: ICD-10-PCS | Mod: CPTII,S$GLB,, | Performed by: INTERNAL MEDICINE

## 2021-07-07 PROCEDURE — 1159F PR MEDICATION LIST DOCUMENTED IN MEDICAL RECORD: ICD-10-PCS | Mod: S$GLB,,, | Performed by: INTERNAL MEDICINE

## 2021-07-07 PROCEDURE — 1159F MED LIST DOCD IN RCRD: CPT | Mod: S$GLB,,, | Performed by: INTERNAL MEDICINE

## 2021-07-07 PROCEDURE — 99214 PR OFFICE/OUTPT VISIT, EST, LEVL IV, 30-39 MIN: ICD-10-PCS | Mod: S$GLB,,, | Performed by: INTERNAL MEDICINE

## 2021-07-07 PROCEDURE — 1101F PT FALLS ASSESS-DOCD LE1/YR: CPT | Mod: CPTII,S$GLB,, | Performed by: INTERNAL MEDICINE

## 2021-07-07 PROCEDURE — 1101F PR PT FALLS ASSESS DOC 0-1 FALLS W/OUT INJ PAST YR: ICD-10-PCS | Mod: CPTII,S$GLB,, | Performed by: INTERNAL MEDICINE

## 2021-07-07 PROCEDURE — 99999 PR PBB SHADOW E&M-EST. PATIENT-LVL V: ICD-10-PCS | Mod: PBBFAC,,, | Performed by: INTERNAL MEDICINE

## 2021-07-07 PROCEDURE — 99499 UNLISTED E&M SERVICE: CPT | Mod: S$GLB,,, | Performed by: INTERNAL MEDICINE

## 2021-07-07 PROCEDURE — 99999 PR PBB SHADOW E&M-EST. PATIENT-LVL V: CPT | Mod: PBBFAC,,, | Performed by: INTERNAL MEDICINE

## 2021-07-07 PROCEDURE — 3008F BODY MASS INDEX DOCD: CPT | Mod: CPTII,S$GLB,, | Performed by: INTERNAL MEDICINE

## 2021-07-07 RX ORDER — CELECOXIB 100 MG/1
100 CAPSULE ORAL DAILY
Qty: 30 CAPSULE | Refills: 6 | Status: SHIPPED | OUTPATIENT
Start: 2021-07-07 | End: 2022-01-12 | Stop reason: SDUPTHER

## 2021-07-16 ENCOUNTER — TELEPHONE (OUTPATIENT)
Dept: ENDOCRINOLOGY | Facility: CLINIC | Age: 71
End: 2021-07-16

## 2021-07-19 ENCOUNTER — CLINICAL SUPPORT (OUTPATIENT)
Dept: REHABILITATION | Facility: HOSPITAL | Age: 71
End: 2021-07-19
Attending: PAIN MEDICINE
Payer: MEDICARE

## 2021-07-19 DIAGNOSIS — M47.812 CERVICAL SPONDYLOSIS: ICD-10-CM

## 2021-07-19 DIAGNOSIS — M54.12 CERVICAL RADICULOPATHY: ICD-10-CM

## 2021-07-19 PROCEDURE — 97012 MECHANICAL TRACTION THERAPY: CPT | Mod: PN,CQ

## 2021-07-19 PROCEDURE — 97110 THERAPEUTIC EXERCISES: CPT | Mod: PN,CQ

## 2021-07-26 ENCOUNTER — CLINICAL SUPPORT (OUTPATIENT)
Dept: REHABILITATION | Facility: HOSPITAL | Age: 71
End: 2021-07-26
Attending: PAIN MEDICINE
Payer: MEDICARE

## 2021-07-26 DIAGNOSIS — M47.812 CERVICAL SPONDYLOSIS: ICD-10-CM

## 2021-07-26 DIAGNOSIS — M54.12 CERVICAL RADICULOPATHY: ICD-10-CM

## 2021-07-26 PROCEDURE — 97110 THERAPEUTIC EXERCISES: CPT | Mod: PN,CQ

## 2021-07-26 PROCEDURE — 97012 MECHANICAL TRACTION THERAPY: CPT | Mod: PN,CQ

## 2021-08-02 ENCOUNTER — CLINICAL SUPPORT (OUTPATIENT)
Dept: REHABILITATION | Facility: HOSPITAL | Age: 71
End: 2021-08-02
Attending: PAIN MEDICINE
Payer: MEDICARE

## 2021-08-02 DIAGNOSIS — M47.812 CERVICAL SPONDYLOSIS: ICD-10-CM

## 2021-08-02 DIAGNOSIS — M54.12 CERVICAL RADICULOPATHY: ICD-10-CM

## 2021-08-02 PROCEDURE — 97012 MECHANICAL TRACTION THERAPY: CPT | Mod: PN,CQ

## 2021-08-02 PROCEDURE — 97110 THERAPEUTIC EXERCISES: CPT | Mod: PN,CQ

## 2021-08-16 ENCOUNTER — CLINICAL SUPPORT (OUTPATIENT)
Dept: REHABILITATION | Facility: HOSPITAL | Age: 71
End: 2021-08-16
Attending: PAIN MEDICINE
Payer: MEDICARE

## 2021-08-16 DIAGNOSIS — M54.12 CERVICAL RADICULOPATHY: Primary | ICD-10-CM

## 2021-08-16 DIAGNOSIS — M47.812 CERVICAL SPONDYLOSIS: ICD-10-CM

## 2021-08-16 PROCEDURE — 97110 THERAPEUTIC EXERCISES: CPT | Mod: PN

## 2021-08-16 PROCEDURE — 97140 MANUAL THERAPY 1/> REGIONS: CPT | Mod: PN

## 2021-08-18 ENCOUNTER — HOSPITAL ENCOUNTER (OUTPATIENT)
Dept: RADIOLOGY | Facility: HOSPITAL | Age: 71
Discharge: HOME | End: 2021-08-18
Attending: PAIN MEDICINE
Payer: MEDICARE

## 2021-08-18 DIAGNOSIS — M50.30 DDD (DEGENERATIVE DISC DISEASE), CERVICAL: ICD-10-CM

## 2021-08-18 DIAGNOSIS — M47.812 CERVICAL SPONDYLOSIS: ICD-10-CM

## 2021-08-18 DIAGNOSIS — M54.12 CERVICAL RADICULOPATHY: ICD-10-CM

## 2021-08-18 PROCEDURE — 72050 X-RAY EXAM NECK SPINE 4/5VWS: CPT | Mod: 26,,, | Performed by: RADIOLOGY

## 2021-08-18 PROCEDURE — 72050 XR CERVICAL SPINE AP LAT WITH FLEX EXTEN: ICD-10-PCS | Mod: 26,,, | Performed by: RADIOLOGY

## 2021-08-18 PROCEDURE — 72050 X-RAY EXAM NECK SPINE 4/5VWS: CPT | Mod: TC,FY

## 2021-08-19 ENCOUNTER — OFFICE VISIT (OUTPATIENT)
Dept: PAIN MEDICINE | Facility: CLINIC | Age: 71
End: 2021-08-19
Payer: MEDICARE

## 2021-08-19 VITALS
TEMPERATURE: 98 F | BODY MASS INDEX: 29.86 KG/M2 | SYSTOLIC BLOOD PRESSURE: 170 MMHG | HEART RATE: 46 BPM | OXYGEN SATURATION: 98 % | HEIGHT: 67 IN | DIASTOLIC BLOOD PRESSURE: 71 MMHG | WEIGHT: 190.25 LBS

## 2021-08-19 DIAGNOSIS — M54.12 CERVICAL RADICULOPATHY: ICD-10-CM

## 2021-08-19 DIAGNOSIS — M47.812 CERVICAL SPONDYLOSIS: ICD-10-CM

## 2021-08-19 DIAGNOSIS — M25.511 CHRONIC RIGHT SHOULDER PAIN: ICD-10-CM

## 2021-08-19 DIAGNOSIS — M50.30 DDD (DEGENERATIVE DISC DISEASE), CERVICAL: Primary | ICD-10-CM

## 2021-08-19 DIAGNOSIS — G89.29 CHRONIC RIGHT SHOULDER PAIN: ICD-10-CM

## 2021-08-19 DIAGNOSIS — M96.1 POSTLAMINECTOMY SYNDROME OF LUMBAR REGION: ICD-10-CM

## 2021-08-19 PROCEDURE — 3008F BODY MASS INDEX DOCD: CPT | Mod: CPTII,S$GLB,, | Performed by: PAIN MEDICINE

## 2021-08-19 PROCEDURE — 3288F PR FALLS RISK ASSESSMENT DOCUMENTED: ICD-10-PCS | Mod: CPTII,S$GLB,, | Performed by: PAIN MEDICINE

## 2021-08-19 PROCEDURE — 1159F PR MEDICATION LIST DOCUMENTED IN MEDICAL RECORD: ICD-10-PCS | Mod: CPTII,S$GLB,, | Performed by: PAIN MEDICINE

## 2021-08-19 PROCEDURE — 3044F HG A1C LEVEL LT 7.0%: CPT | Mod: CPTII,S$GLB,, | Performed by: PAIN MEDICINE

## 2021-08-19 PROCEDURE — 99214 OFFICE O/P EST MOD 30 MIN: CPT | Mod: 25,S$GLB,, | Performed by: PAIN MEDICINE

## 2021-08-19 PROCEDURE — 1160F RVW MEDS BY RX/DR IN RCRD: CPT | Mod: CPTII,S$GLB,, | Performed by: PAIN MEDICINE

## 2021-08-19 PROCEDURE — 1160F PR REVIEW ALL MEDS BY PRESCRIBER/CLIN PHARMACIST DOCUMENTED: ICD-10-PCS | Mod: CPTII,S$GLB,, | Performed by: PAIN MEDICINE

## 2021-08-19 PROCEDURE — 3288F FALL RISK ASSESSMENT DOCD: CPT | Mod: CPTII,S$GLB,, | Performed by: PAIN MEDICINE

## 2021-08-19 PROCEDURE — 3078F DIAST BP <80 MM HG: CPT | Mod: CPTII,S$GLB,, | Performed by: PAIN MEDICINE

## 2021-08-19 PROCEDURE — 1125F AMNT PAIN NOTED PAIN PRSNT: CPT | Mod: CPTII,S$GLB,, | Performed by: PAIN MEDICINE

## 2021-08-19 PROCEDURE — 3077F PR MOST RECENT SYSTOLIC BLOOD PRESSURE >= 140 MM HG: ICD-10-PCS | Mod: CPTII,S$GLB,, | Performed by: PAIN MEDICINE

## 2021-08-19 PROCEDURE — 20553 PR INJECT TRIGGER POINTS, > 3: ICD-10-PCS | Mod: S$GLB,,, | Performed by: PAIN MEDICINE

## 2021-08-19 PROCEDURE — 1125F PR PAIN SEVERITY QUANTIFIED, PAIN PRESENT: ICD-10-PCS | Mod: CPTII,S$GLB,, | Performed by: PAIN MEDICINE

## 2021-08-19 PROCEDURE — 3044F PR MOST RECENT HEMOGLOBIN A1C LEVEL <7.0%: ICD-10-PCS | Mod: CPTII,S$GLB,, | Performed by: PAIN MEDICINE

## 2021-08-19 PROCEDURE — 1101F PR PT FALLS ASSESS DOC 0-1 FALLS W/OUT INJ PAST YR: ICD-10-PCS | Mod: CPTII,S$GLB,, | Performed by: PAIN MEDICINE

## 2021-08-19 PROCEDURE — 1159F MED LIST DOCD IN RCRD: CPT | Mod: CPTII,S$GLB,, | Performed by: PAIN MEDICINE

## 2021-08-19 PROCEDURE — 1101F PT FALLS ASSESS-DOCD LE1/YR: CPT | Mod: CPTII,S$GLB,, | Performed by: PAIN MEDICINE

## 2021-08-19 PROCEDURE — 99999 PR PBB SHADOW E&M-EST. PATIENT-LVL V: ICD-10-PCS | Mod: PBBFAC,,, | Performed by: PAIN MEDICINE

## 2021-08-19 PROCEDURE — 3077F SYST BP >= 140 MM HG: CPT | Mod: CPTII,S$GLB,, | Performed by: PAIN MEDICINE

## 2021-08-19 PROCEDURE — 99999 PR PBB SHADOW E&M-EST. PATIENT-LVL V: CPT | Mod: PBBFAC,,, | Performed by: PAIN MEDICINE

## 2021-08-19 PROCEDURE — 3078F PR MOST RECENT DIASTOLIC BLOOD PRESSURE < 80 MM HG: ICD-10-PCS | Mod: CPTII,S$GLB,, | Performed by: PAIN MEDICINE

## 2021-08-19 PROCEDURE — 99214 PR OFFICE/OUTPT VISIT, EST, LEVL IV, 30-39 MIN: ICD-10-PCS | Mod: 25,S$GLB,, | Performed by: PAIN MEDICINE

## 2021-08-19 PROCEDURE — 3008F PR BODY MASS INDEX (BMI) DOCUMENTED: ICD-10-PCS | Mod: CPTII,S$GLB,, | Performed by: PAIN MEDICINE

## 2021-08-19 PROCEDURE — 20553 NJX 1/MLT TRIGGER POINTS 3/>: CPT | Mod: S$GLB,,, | Performed by: PAIN MEDICINE

## 2021-08-19 RX ORDER — BETAMETHASONE SODIUM PHOSPHATE AND BETAMETHASONE ACETATE 3; 3 MG/ML; MG/ML
3 INJECTION, SUSPENSION INTRA-ARTICULAR; INTRALESIONAL; INTRAMUSCULAR; SOFT TISSUE
Status: COMPLETED | OUTPATIENT
Start: 2021-08-19 | End: 2021-08-19

## 2021-08-19 RX ORDER — REGADENOSON 0.08 MG/ML
INJECTION, SOLUTION INTRAVENOUS
COMMUNITY
Start: 2021-05-19 | End: 2022-03-21

## 2021-08-19 RX ADMIN — BETAMETHASONE SODIUM PHOSPHATE AND BETAMETHASONE ACETATE 3 MG: 3; 3 INJECTION, SUSPENSION INTRA-ARTICULAR; INTRALESIONAL; INTRAMUSCULAR; SOFT TISSUE at 10:08

## 2021-08-26 ENCOUNTER — TELEPHONE (OUTPATIENT)
Dept: ENDOCRINOLOGY | Facility: CLINIC | Age: 71
End: 2021-08-26

## 2021-08-27 ENCOUNTER — PES CALL (OUTPATIENT)
Dept: ADMINISTRATIVE | Facility: CLINIC | Age: 71
End: 2021-08-27

## 2021-09-09 ENCOUNTER — TELEPHONE (OUTPATIENT)
Dept: ENDOCRINOLOGY | Facility: CLINIC | Age: 71
End: 2021-09-09

## 2021-09-15 ENCOUNTER — LAB VISIT (OUTPATIENT)
Dept: LAB | Facility: HOSPITAL | Age: 71
End: 2021-09-15
Attending: NURSE PRACTITIONER
Payer: MEDICARE

## 2021-09-15 LAB
CREAT SERPL-MCNC: 1.1 MG/DL (ref 0.5–1.4)
EST. GFR  (AFRICAN AMERICAN): >60 ML/MIN/1.73 M^2
EST. GFR  (NON AFRICAN AMERICAN): >60 ML/MIN/1.73 M^2
ESTIMATED AVG GLUCOSE: 151 MG/DL (ref 68–131)
HBA1C MFR BLD: 6.9 % (ref 4–5.6)

## 2021-09-15 PROCEDURE — 36415 COLL VENOUS BLD VENIPUNCTURE: CPT | Mod: PO | Performed by: NURSE PRACTITIONER

## 2021-09-15 PROCEDURE — 82565 ASSAY OF CREATININE: CPT | Performed by: NURSE PRACTITIONER

## 2021-09-15 PROCEDURE — 83036 HEMOGLOBIN GLYCOSYLATED A1C: CPT | Performed by: NURSE PRACTITIONER

## 2021-09-17 ENCOUNTER — TELEPHONE (OUTPATIENT)
Dept: PAIN MEDICINE | Facility: CLINIC | Age: 71
End: 2021-09-17

## 2021-09-20 ENCOUNTER — OFFICE VISIT (OUTPATIENT)
Dept: ENDOCRINOLOGY | Facility: CLINIC | Age: 71
End: 2021-09-20
Payer: MEDICARE

## 2021-09-20 VITALS
BODY MASS INDEX: 30.7 KG/M2 | TEMPERATURE: 99 F | HEART RATE: 64 BPM | DIASTOLIC BLOOD PRESSURE: 58 MMHG | SYSTOLIC BLOOD PRESSURE: 130 MMHG | WEIGHT: 196 LBS

## 2021-09-20 DIAGNOSIS — K12.1 MOUTH ULCERS: ICD-10-CM

## 2021-09-20 DIAGNOSIS — I25.10 CORONARY ARTERY DISEASE INVOLVING NATIVE CORONARY ARTERY OF NATIVE HEART WITHOUT ANGINA PECTORIS: ICD-10-CM

## 2021-09-20 DIAGNOSIS — E11.649 HYPOGLYCEMIA ASSOCIATED WITH DIABETES: ICD-10-CM

## 2021-09-20 PROCEDURE — 1159F MED LIST DOCD IN RCRD: CPT | Mod: CPTII,S$GLB,, | Performed by: NURSE PRACTITIONER

## 2021-09-20 PROCEDURE — 3078F PR MOST RECENT DIASTOLIC BLOOD PRESSURE < 80 MM HG: ICD-10-PCS | Mod: CPTII,S$GLB,, | Performed by: NURSE PRACTITIONER

## 2021-09-20 PROCEDURE — 99214 OFFICE O/P EST MOD 30 MIN: CPT | Mod: S$GLB,,, | Performed by: NURSE PRACTITIONER

## 2021-09-20 PROCEDURE — 1159F PR MEDICATION LIST DOCUMENTED IN MEDICAL RECORD: ICD-10-PCS | Mod: CPTII,S$GLB,, | Performed by: NURSE PRACTITIONER

## 2021-09-20 PROCEDURE — 3044F PR MOST RECENT HEMOGLOBIN A1C LEVEL <7.0%: ICD-10-PCS | Mod: CPTII,S$GLB,, | Performed by: NURSE PRACTITIONER

## 2021-09-20 PROCEDURE — 99999 PR PBB SHADOW E&M-EST. PATIENT-LVL V: CPT | Mod: PBBFAC,,, | Performed by: NURSE PRACTITIONER

## 2021-09-20 PROCEDURE — 1160F PR REVIEW ALL MEDS BY PRESCRIBER/CLIN PHARMACIST DOCUMENTED: ICD-10-PCS | Mod: CPTII,S$GLB,, | Performed by: NURSE PRACTITIONER

## 2021-09-20 PROCEDURE — 1126F AMNT PAIN NOTED NONE PRSNT: CPT | Mod: CPTII,S$GLB,, | Performed by: NURSE PRACTITIONER

## 2021-09-20 PROCEDURE — 3075F PR MOST RECENT SYSTOLIC BLOOD PRESS GE 130-139MM HG: ICD-10-PCS | Mod: CPTII,S$GLB,, | Performed by: NURSE PRACTITIONER

## 2021-09-20 PROCEDURE — 3008F BODY MASS INDEX DOCD: CPT | Mod: CPTII,S$GLB,, | Performed by: NURSE PRACTITIONER

## 2021-09-20 PROCEDURE — 3044F HG A1C LEVEL LT 7.0%: CPT | Mod: CPTII,S$GLB,, | Performed by: NURSE PRACTITIONER

## 2021-09-20 PROCEDURE — 99999 PR PBB SHADOW E&M-EST. PATIENT-LVL V: ICD-10-PCS | Mod: PBBFAC,,, | Performed by: NURSE PRACTITIONER

## 2021-09-20 PROCEDURE — 3008F PR BODY MASS INDEX (BMI) DOCUMENTED: ICD-10-PCS | Mod: CPTII,S$GLB,, | Performed by: NURSE PRACTITIONER

## 2021-09-20 PROCEDURE — 3075F SYST BP GE 130 - 139MM HG: CPT | Mod: CPTII,S$GLB,, | Performed by: NURSE PRACTITIONER

## 2021-09-20 PROCEDURE — 3078F DIAST BP <80 MM HG: CPT | Mod: CPTII,S$GLB,, | Performed by: NURSE PRACTITIONER

## 2021-09-20 PROCEDURE — 1160F RVW MEDS BY RX/DR IN RCRD: CPT | Mod: CPTII,S$GLB,, | Performed by: NURSE PRACTITIONER

## 2021-09-20 PROCEDURE — 1126F PR PAIN SEVERITY QUANTIFIED, NO PAIN PRESENT: ICD-10-PCS | Mod: CPTII,S$GLB,, | Performed by: NURSE PRACTITIONER

## 2021-09-20 PROCEDURE — 99214 PR OFFICE/OUTPT VISIT, EST, LEVL IV, 30-39 MIN: ICD-10-PCS | Mod: S$GLB,,, | Performed by: NURSE PRACTITIONER

## 2021-09-20 RX ORDER — INSULIN DEGLUDEC 100 U/ML
30 INJECTION, SOLUTION SUBCUTANEOUS DAILY
Start: 2021-09-20 | End: 2022-09-20

## 2021-09-22 ENCOUNTER — OFFICE VISIT (OUTPATIENT)
Dept: PAIN MEDICINE | Facility: CLINIC | Age: 71
End: 2021-09-22
Payer: MEDICARE

## 2021-09-22 VITALS
BODY MASS INDEX: 30.04 KG/M2 | TEMPERATURE: 99 F | OXYGEN SATURATION: 98 % | RESPIRATION RATE: 18 BRPM | SYSTOLIC BLOOD PRESSURE: 130 MMHG | HEART RATE: 62 BPM | DIASTOLIC BLOOD PRESSURE: 62 MMHG | HEIGHT: 67 IN | WEIGHT: 191.38 LBS

## 2021-09-22 DIAGNOSIS — M25.511 CHRONIC RIGHT SHOULDER PAIN: Primary | ICD-10-CM

## 2021-09-22 DIAGNOSIS — G89.29 CHRONIC RIGHT SHOULDER PAIN: Primary | ICD-10-CM

## 2021-09-22 PROCEDURE — 3288F PR FALLS RISK ASSESSMENT DOCUMENTED: ICD-10-PCS | Mod: CPTII,S$GLB,, | Performed by: PAIN MEDICINE

## 2021-09-22 PROCEDURE — 3288F FALL RISK ASSESSMENT DOCD: CPT | Mod: CPTII,S$GLB,, | Performed by: PAIN MEDICINE

## 2021-09-22 PROCEDURE — 1101F PR PT FALLS ASSESS DOC 0-1 FALLS W/OUT INJ PAST YR: ICD-10-PCS | Mod: CPTII,S$GLB,, | Performed by: PAIN MEDICINE

## 2021-09-22 PROCEDURE — 1159F PR MEDICATION LIST DOCUMENTED IN MEDICAL RECORD: ICD-10-PCS | Mod: CPTII,S$GLB,, | Performed by: PAIN MEDICINE

## 2021-09-22 PROCEDURE — 99999 PR PBB SHADOW E&M-EST. PATIENT-LVL V: ICD-10-PCS | Mod: PBBFAC,,, | Performed by: PAIN MEDICINE

## 2021-09-22 PROCEDURE — 3044F HG A1C LEVEL LT 7.0%: CPT | Mod: CPTII,S$GLB,, | Performed by: PAIN MEDICINE

## 2021-09-22 PROCEDURE — 99213 OFFICE O/P EST LOW 20 MIN: CPT | Mod: S$GLB,,, | Performed by: PAIN MEDICINE

## 2021-09-22 PROCEDURE — 3044F PR MOST RECENT HEMOGLOBIN A1C LEVEL <7.0%: ICD-10-PCS | Mod: CPTII,S$GLB,, | Performed by: PAIN MEDICINE

## 2021-09-22 PROCEDURE — 1159F MED LIST DOCD IN RCRD: CPT | Mod: CPTII,S$GLB,, | Performed by: PAIN MEDICINE

## 2021-09-22 PROCEDURE — 1125F AMNT PAIN NOTED PAIN PRSNT: CPT | Mod: CPTII,S$GLB,, | Performed by: PAIN MEDICINE

## 2021-09-22 PROCEDURE — 3075F SYST BP GE 130 - 139MM HG: CPT | Mod: CPTII,S$GLB,, | Performed by: PAIN MEDICINE

## 2021-09-22 PROCEDURE — 1160F RVW MEDS BY RX/DR IN RCRD: CPT | Mod: CPTII,S$GLB,, | Performed by: PAIN MEDICINE

## 2021-09-22 PROCEDURE — 3075F PR MOST RECENT SYSTOLIC BLOOD PRESS GE 130-139MM HG: ICD-10-PCS | Mod: CPTII,S$GLB,, | Performed by: PAIN MEDICINE

## 2021-09-22 PROCEDURE — 3078F PR MOST RECENT DIASTOLIC BLOOD PRESSURE < 80 MM HG: ICD-10-PCS | Mod: CPTII,S$GLB,, | Performed by: PAIN MEDICINE

## 2021-09-22 PROCEDURE — 99213 PR OFFICE/OUTPT VISIT, EST, LEVL III, 20-29 MIN: ICD-10-PCS | Mod: S$GLB,,, | Performed by: PAIN MEDICINE

## 2021-09-22 PROCEDURE — 3008F PR BODY MASS INDEX (BMI) DOCUMENTED: ICD-10-PCS | Mod: CPTII,S$GLB,, | Performed by: PAIN MEDICINE

## 2021-09-22 PROCEDURE — 3078F DIAST BP <80 MM HG: CPT | Mod: CPTII,S$GLB,, | Performed by: PAIN MEDICINE

## 2021-09-22 PROCEDURE — 99999 PR PBB SHADOW E&M-EST. PATIENT-LVL V: CPT | Mod: PBBFAC,,, | Performed by: PAIN MEDICINE

## 2021-09-22 PROCEDURE — 3008F BODY MASS INDEX DOCD: CPT | Mod: CPTII,S$GLB,, | Performed by: PAIN MEDICINE

## 2021-09-22 PROCEDURE — 1160F PR REVIEW ALL MEDS BY PRESCRIBER/CLIN PHARMACIST DOCUMENTED: ICD-10-PCS | Mod: CPTII,S$GLB,, | Performed by: PAIN MEDICINE

## 2021-09-22 PROCEDURE — 1125F PR PAIN SEVERITY QUANTIFIED, PAIN PRESENT: ICD-10-PCS | Mod: CPTII,S$GLB,, | Performed by: PAIN MEDICINE

## 2021-09-22 PROCEDURE — 1101F PT FALLS ASSESS-DOCD LE1/YR: CPT | Mod: CPTII,S$GLB,, | Performed by: PAIN MEDICINE

## 2021-09-23 ENCOUNTER — TELEPHONE (OUTPATIENT)
Dept: ENDOCRINOLOGY | Facility: CLINIC | Age: 71
End: 2021-09-23

## 2021-10-05 ENCOUNTER — OFFICE VISIT (OUTPATIENT)
Dept: ORTHOPEDICS | Facility: CLINIC | Age: 71
End: 2021-10-05
Payer: MEDICARE

## 2021-10-05 VITALS
HEART RATE: 40 BPM | BODY MASS INDEX: 30.04 KG/M2 | WEIGHT: 191.38 LBS | HEIGHT: 67 IN | DIASTOLIC BLOOD PRESSURE: 60 MMHG | OXYGEN SATURATION: 98 % | RESPIRATION RATE: 18 BRPM | SYSTOLIC BLOOD PRESSURE: 122 MMHG

## 2021-10-05 DIAGNOSIS — G89.29 CHRONIC RIGHT SHOULDER PAIN: ICD-10-CM

## 2021-10-05 DIAGNOSIS — M25.511 CHRONIC RIGHT SHOULDER PAIN: ICD-10-CM

## 2021-10-05 PROCEDURE — 99999 PR PBB SHADOW E&M-EST. PATIENT-LVL V: CPT | Mod: PBBFAC,,, | Performed by: ORTHOPAEDIC SURGERY

## 2021-10-05 PROCEDURE — 3044F PR MOST RECENT HEMOGLOBIN A1C LEVEL <7.0%: ICD-10-PCS | Mod: CPTII,S$GLB,, | Performed by: ORTHOPAEDIC SURGERY

## 2021-10-05 PROCEDURE — 3044F HG A1C LEVEL LT 7.0%: CPT | Mod: CPTII,S$GLB,, | Performed by: ORTHOPAEDIC SURGERY

## 2021-10-05 PROCEDURE — 3288F PR FALLS RISK ASSESSMENT DOCUMENTED: ICD-10-PCS | Mod: CPTII,S$GLB,, | Performed by: ORTHOPAEDIC SURGERY

## 2021-10-05 PROCEDURE — 1159F PR MEDICATION LIST DOCUMENTED IN MEDICAL RECORD: ICD-10-PCS | Mod: CPTII,S$GLB,, | Performed by: ORTHOPAEDIC SURGERY

## 2021-10-05 PROCEDURE — 1125F PR PAIN SEVERITY QUANTIFIED, PAIN PRESENT: ICD-10-PCS | Mod: CPTII,S$GLB,, | Performed by: ORTHOPAEDIC SURGERY

## 2021-10-05 PROCEDURE — 99204 OFFICE O/P NEW MOD 45 MIN: CPT | Mod: S$GLB,,, | Performed by: ORTHOPAEDIC SURGERY

## 2021-10-05 PROCEDURE — 3074F SYST BP LT 130 MM HG: CPT | Mod: CPTII,S$GLB,, | Performed by: ORTHOPAEDIC SURGERY

## 2021-10-05 PROCEDURE — 1159F MED LIST DOCD IN RCRD: CPT | Mod: CPTII,S$GLB,, | Performed by: ORTHOPAEDIC SURGERY

## 2021-10-05 PROCEDURE — 99999 PR PBB SHADOW E&M-EST. PATIENT-LVL V: ICD-10-PCS | Mod: PBBFAC,,, | Performed by: ORTHOPAEDIC SURGERY

## 2021-10-05 PROCEDURE — 99204 PR OFFICE/OUTPT VISIT, NEW, LEVL IV, 45-59 MIN: ICD-10-PCS | Mod: S$GLB,,, | Performed by: ORTHOPAEDIC SURGERY

## 2021-10-05 PROCEDURE — 3074F PR MOST RECENT SYSTOLIC BLOOD PRESSURE < 130 MM HG: ICD-10-PCS | Mod: CPTII,S$GLB,, | Performed by: ORTHOPAEDIC SURGERY

## 2021-10-05 PROCEDURE — 3008F PR BODY MASS INDEX (BMI) DOCUMENTED: ICD-10-PCS | Mod: CPTII,S$GLB,, | Performed by: ORTHOPAEDIC SURGERY

## 2021-10-05 PROCEDURE — 3078F PR MOST RECENT DIASTOLIC BLOOD PRESSURE < 80 MM HG: ICD-10-PCS | Mod: CPTII,S$GLB,, | Performed by: ORTHOPAEDIC SURGERY

## 2021-10-05 PROCEDURE — 3078F DIAST BP <80 MM HG: CPT | Mod: CPTII,S$GLB,, | Performed by: ORTHOPAEDIC SURGERY

## 2021-10-05 PROCEDURE — 3008F BODY MASS INDEX DOCD: CPT | Mod: CPTII,S$GLB,, | Performed by: ORTHOPAEDIC SURGERY

## 2021-10-05 PROCEDURE — 1125F AMNT PAIN NOTED PAIN PRSNT: CPT | Mod: CPTII,S$GLB,, | Performed by: ORTHOPAEDIC SURGERY

## 2021-10-05 PROCEDURE — 1101F PR PT FALLS ASSESS DOC 0-1 FALLS W/OUT INJ PAST YR: ICD-10-PCS | Mod: CPTII,S$GLB,, | Performed by: ORTHOPAEDIC SURGERY

## 2021-10-05 PROCEDURE — 3288F FALL RISK ASSESSMENT DOCD: CPT | Mod: CPTII,S$GLB,, | Performed by: ORTHOPAEDIC SURGERY

## 2021-10-05 PROCEDURE — 1101F PT FALLS ASSESS-DOCD LE1/YR: CPT | Mod: CPTII,S$GLB,, | Performed by: ORTHOPAEDIC SURGERY

## 2021-10-11 ENCOUNTER — HOSPITAL ENCOUNTER (OUTPATIENT)
Dept: RADIOLOGY | Facility: HOSPITAL | Age: 71
Discharge: HOME | End: 2021-10-11
Attending: ORTHOPAEDIC SURGERY
Payer: MEDICARE

## 2021-10-11 DIAGNOSIS — G89.29 CHRONIC RIGHT SHOULDER PAIN: ICD-10-CM

## 2021-10-11 DIAGNOSIS — M25.511 CHRONIC RIGHT SHOULDER PAIN: ICD-10-CM

## 2021-10-11 PROCEDURE — 73221 MRI JOINT UPR EXTREM W/O DYE: CPT | Mod: 26,RT,, | Performed by: RADIOLOGY

## 2021-10-11 PROCEDURE — 73221 MRI SHOULDER WITHOUT CONTRAST RIGHT: ICD-10-PCS | Mod: 26,RT,, | Performed by: RADIOLOGY

## 2021-10-11 PROCEDURE — 73221 MRI JOINT UPR EXTREM W/O DYE: CPT | Mod: TC,PO,RT

## 2021-10-12 ENCOUNTER — TELEPHONE (OUTPATIENT)
Dept: FAMILY MEDICINE | Facility: CLINIC | Age: 71
End: 2021-10-12

## 2021-10-14 ENCOUNTER — OFFICE VISIT (OUTPATIENT)
Dept: ORTHOPEDICS | Facility: CLINIC | Age: 71
End: 2021-10-14
Payer: MEDICARE

## 2021-10-14 VITALS
HEIGHT: 67 IN | HEART RATE: 50 BPM | TEMPERATURE: 98 F | WEIGHT: 191.38 LBS | BODY MASS INDEX: 30.04 KG/M2 | OXYGEN SATURATION: 99 % | RESPIRATION RATE: 18 BRPM

## 2021-10-14 DIAGNOSIS — M25.511 CHRONIC RIGHT SHOULDER PAIN: Primary | ICD-10-CM

## 2021-10-14 DIAGNOSIS — G89.29 CHRONIC RIGHT SHOULDER PAIN: Primary | ICD-10-CM

## 2021-10-14 PROCEDURE — 99213 PR OFFICE/OUTPT VISIT, EST, LEVL III, 20-29 MIN: ICD-10-PCS | Mod: 25,S$GLB,, | Performed by: ORTHOPAEDIC SURGERY

## 2021-10-14 PROCEDURE — 1125F PR PAIN SEVERITY QUANTIFIED, PAIN PRESENT: ICD-10-PCS | Mod: CPTII,S$GLB,, | Performed by: ORTHOPAEDIC SURGERY

## 2021-10-14 PROCEDURE — 99213 OFFICE O/P EST LOW 20 MIN: CPT | Mod: 25,S$GLB,, | Performed by: ORTHOPAEDIC SURGERY

## 2021-10-14 PROCEDURE — 99999 PR PBB SHADOW E&M-EST. PATIENT-LVL V: ICD-10-PCS | Mod: PBBFAC,,, | Performed by: ORTHOPAEDIC SURGERY

## 2021-10-14 PROCEDURE — 3288F FALL RISK ASSESSMENT DOCD: CPT | Mod: CPTII,S$GLB,, | Performed by: ORTHOPAEDIC SURGERY

## 2021-10-14 PROCEDURE — 3008F BODY MASS INDEX DOCD: CPT | Mod: CPTII,S$GLB,, | Performed by: ORTHOPAEDIC SURGERY

## 2021-10-14 PROCEDURE — 1101F PT FALLS ASSESS-DOCD LE1/YR: CPT | Mod: CPTII,S$GLB,, | Performed by: ORTHOPAEDIC SURGERY

## 2021-10-14 PROCEDURE — 3044F PR MOST RECENT HEMOGLOBIN A1C LEVEL <7.0%: ICD-10-PCS | Mod: CPTII,S$GLB,, | Performed by: ORTHOPAEDIC SURGERY

## 2021-10-14 PROCEDURE — 1159F MED LIST DOCD IN RCRD: CPT | Mod: CPTII,S$GLB,, | Performed by: ORTHOPAEDIC SURGERY

## 2021-10-14 PROCEDURE — 1159F PR MEDICATION LIST DOCUMENTED IN MEDICAL RECORD: ICD-10-PCS | Mod: CPTII,S$GLB,, | Performed by: ORTHOPAEDIC SURGERY

## 2021-10-14 PROCEDURE — 1125F AMNT PAIN NOTED PAIN PRSNT: CPT | Mod: CPTII,S$GLB,, | Performed by: ORTHOPAEDIC SURGERY

## 2021-10-14 PROCEDURE — 3008F PR BODY MASS INDEX (BMI) DOCUMENTED: ICD-10-PCS | Mod: CPTII,S$GLB,, | Performed by: ORTHOPAEDIC SURGERY

## 2021-10-14 PROCEDURE — 20610 DRAIN/INJ JOINT/BURSA W/O US: CPT | Mod: RT,S$GLB,, | Performed by: ORTHOPAEDIC SURGERY

## 2021-10-14 PROCEDURE — 1160F PR REVIEW ALL MEDS BY PRESCRIBER/CLIN PHARMACIST DOCUMENTED: ICD-10-PCS | Mod: CPTII,S$GLB,, | Performed by: ORTHOPAEDIC SURGERY

## 2021-10-14 PROCEDURE — 20610 LARGE JOINT ASPIRATION/INJECTION: R SUBACROMIAL BURSA: ICD-10-PCS | Mod: RT,S$GLB,, | Performed by: ORTHOPAEDIC SURGERY

## 2021-10-14 PROCEDURE — 1101F PR PT FALLS ASSESS DOC 0-1 FALLS W/OUT INJ PAST YR: ICD-10-PCS | Mod: CPTII,S$GLB,, | Performed by: ORTHOPAEDIC SURGERY

## 2021-10-14 PROCEDURE — 3288F PR FALLS RISK ASSESSMENT DOCUMENTED: ICD-10-PCS | Mod: CPTII,S$GLB,, | Performed by: ORTHOPAEDIC SURGERY

## 2021-10-14 PROCEDURE — 3044F HG A1C LEVEL LT 7.0%: CPT | Mod: CPTII,S$GLB,, | Performed by: ORTHOPAEDIC SURGERY

## 2021-10-14 PROCEDURE — 99999 PR PBB SHADOW E&M-EST. PATIENT-LVL V: CPT | Mod: PBBFAC,,, | Performed by: ORTHOPAEDIC SURGERY

## 2021-10-14 PROCEDURE — 1160F RVW MEDS BY RX/DR IN RCRD: CPT | Mod: CPTII,S$GLB,, | Performed by: ORTHOPAEDIC SURGERY

## 2021-10-14 RX ORDER — TRIAMCINOLONE ACETONIDE 40 MG/ML
40 INJECTION, SUSPENSION INTRA-ARTICULAR; INTRAMUSCULAR
Status: DISCONTINUED | OUTPATIENT
Start: 2021-10-14 | End: 2021-10-14 | Stop reason: HOSPADM

## 2021-10-14 RX ADMIN — TRIAMCINOLONE ACETONIDE 40 MG: 40 INJECTION, SUSPENSION INTRA-ARTICULAR; INTRAMUSCULAR at 09:10

## 2021-10-26 ENCOUNTER — TELEPHONE (OUTPATIENT)
Dept: ENDOCRINOLOGY | Facility: CLINIC | Age: 71
End: 2021-10-26
Payer: MEDICARE

## 2021-10-29 ENCOUNTER — OFFICE VISIT (OUTPATIENT)
Dept: FAMILY MEDICINE | Facility: CLINIC | Age: 71
End: 2021-10-29
Payer: MEDICARE

## 2021-10-29 VITALS
HEART RATE: 65 BPM | BODY MASS INDEX: 29.72 KG/M2 | TEMPERATURE: 98 F | DIASTOLIC BLOOD PRESSURE: 76 MMHG | SYSTOLIC BLOOD PRESSURE: 110 MMHG | HEIGHT: 67 IN | WEIGHT: 189.38 LBS | OXYGEN SATURATION: 97 %

## 2021-10-29 DIAGNOSIS — I25.119 CORONARY ARTERY DISEASE INVOLVING NATIVE CORONARY ARTERY OF NATIVE HEART WITH ANGINA PECTORIS: ICD-10-CM

## 2021-10-29 DIAGNOSIS — E11.42 DIABETIC POLYNEUROPATHY ASSOCIATED WITH TYPE 2 DIABETES MELLITUS: ICD-10-CM

## 2021-10-29 DIAGNOSIS — R20.9 DISTURBANCE OF SKIN SENSATION: Primary | ICD-10-CM

## 2021-10-29 DIAGNOSIS — Z23 NEEDS FLU SHOT: ICD-10-CM

## 2021-10-29 DIAGNOSIS — I10 ESSENTIAL HYPERTENSION: ICD-10-CM

## 2021-10-29 DIAGNOSIS — N18.30 STAGE 3 CHRONIC KIDNEY DISEASE, UNSPECIFIED WHETHER STAGE 3A OR 3B CKD: ICD-10-CM

## 2021-10-29 DIAGNOSIS — Z79.4 LONG-TERM INSULIN USE: ICD-10-CM

## 2021-10-29 DIAGNOSIS — L29.9 ITCHING: ICD-10-CM

## 2021-10-29 PROCEDURE — 3008F BODY MASS INDEX DOCD: CPT | Mod: CPTII,S$GLB,, | Performed by: INTERNAL MEDICINE

## 2021-10-29 PROCEDURE — 1126F PR PAIN SEVERITY QUANTIFIED, NO PAIN PRESENT: ICD-10-PCS | Mod: CPTII,S$GLB,, | Performed by: INTERNAL MEDICINE

## 2021-10-29 PROCEDURE — 3074F PR MOST RECENT SYSTOLIC BLOOD PRESSURE < 130 MM HG: ICD-10-PCS | Mod: CPTII,S$GLB,, | Performed by: INTERNAL MEDICINE

## 2021-10-29 PROCEDURE — 3288F FALL RISK ASSESSMENT DOCD: CPT | Mod: CPTII,S$GLB,, | Performed by: INTERNAL MEDICINE

## 2021-10-29 PROCEDURE — 1101F PR PT FALLS ASSESS DOC 0-1 FALLS W/OUT INJ PAST YR: ICD-10-PCS | Mod: CPTII,S$GLB,, | Performed by: INTERNAL MEDICINE

## 2021-10-29 PROCEDURE — 99999 PR PBB SHADOW E&M-EST. PATIENT-LVL V: ICD-10-PCS | Mod: PBBFAC,,, | Performed by: INTERNAL MEDICINE

## 2021-10-29 PROCEDURE — 3074F SYST BP LT 130 MM HG: CPT | Mod: CPTII,S$GLB,, | Performed by: INTERNAL MEDICINE

## 2021-10-29 PROCEDURE — 99214 PR OFFICE/OUTPT VISIT, EST, LEVL IV, 30-39 MIN: ICD-10-PCS | Mod: S$GLB,,, | Performed by: INTERNAL MEDICINE

## 2021-10-29 PROCEDURE — 99999 PR PBB SHADOW E&M-EST. PATIENT-LVL V: CPT | Mod: PBBFAC,,, | Performed by: INTERNAL MEDICINE

## 2021-10-29 PROCEDURE — 1159F MED LIST DOCD IN RCRD: CPT | Mod: CPTII,S$GLB,, | Performed by: INTERNAL MEDICINE

## 2021-10-29 PROCEDURE — 3078F DIAST BP <80 MM HG: CPT | Mod: CPTII,S$GLB,, | Performed by: INTERNAL MEDICINE

## 2021-10-29 PROCEDURE — 1126F AMNT PAIN NOTED NONE PRSNT: CPT | Mod: CPTII,S$GLB,, | Performed by: INTERNAL MEDICINE

## 2021-10-29 PROCEDURE — 3044F PR MOST RECENT HEMOGLOBIN A1C LEVEL <7.0%: ICD-10-PCS | Mod: CPTII,S$GLB,, | Performed by: INTERNAL MEDICINE

## 2021-10-29 PROCEDURE — 99214 OFFICE O/P EST MOD 30 MIN: CPT | Mod: S$GLB,,, | Performed by: INTERNAL MEDICINE

## 2021-10-29 PROCEDURE — 1159F PR MEDICATION LIST DOCUMENTED IN MEDICAL RECORD: ICD-10-PCS | Mod: CPTII,S$GLB,, | Performed by: INTERNAL MEDICINE

## 2021-10-29 PROCEDURE — 3044F HG A1C LEVEL LT 7.0%: CPT | Mod: CPTII,S$GLB,, | Performed by: INTERNAL MEDICINE

## 2021-10-29 PROCEDURE — 90694 FLU VACCINE - QUADRIVALENT - ADJUVANTED: ICD-10-PCS | Mod: S$GLB,,, | Performed by: INTERNAL MEDICINE

## 2021-10-29 PROCEDURE — 90694 VACC AIIV4 NO PRSRV 0.5ML IM: CPT | Mod: S$GLB,,, | Performed by: INTERNAL MEDICINE

## 2021-10-29 PROCEDURE — G0008 ADMIN INFLUENZA VIRUS VAC: HCPCS | Mod: S$GLB,,, | Performed by: INTERNAL MEDICINE

## 2021-10-29 PROCEDURE — G0008 FLU VACCINE - QUADRIVALENT - ADJUVANTED: ICD-10-PCS | Mod: S$GLB,,, | Performed by: INTERNAL MEDICINE

## 2021-10-29 PROCEDURE — 1101F PT FALLS ASSESS-DOCD LE1/YR: CPT | Mod: CPTII,S$GLB,, | Performed by: INTERNAL MEDICINE

## 2021-10-29 PROCEDURE — 3078F PR MOST RECENT DIASTOLIC BLOOD PRESSURE < 80 MM HG: ICD-10-PCS | Mod: CPTII,S$GLB,, | Performed by: INTERNAL MEDICINE

## 2021-10-29 PROCEDURE — 3288F PR FALLS RISK ASSESSMENT DOCUMENTED: ICD-10-PCS | Mod: CPTII,S$GLB,, | Performed by: INTERNAL MEDICINE

## 2021-10-29 PROCEDURE — 3008F PR BODY MASS INDEX (BMI) DOCUMENTED: ICD-10-PCS | Mod: CPTII,S$GLB,, | Performed by: INTERNAL MEDICINE

## 2021-11-11 ENCOUNTER — OFFICE VISIT (OUTPATIENT)
Dept: OPHTHALMOLOGY | Facility: CLINIC | Age: 71
End: 2021-11-11
Payer: MEDICARE

## 2021-11-11 DIAGNOSIS — E11.9 DM TYPE 2 WITHOUT RETINOPATHY: ICD-10-CM

## 2021-11-11 DIAGNOSIS — H25.12 NUCLEAR SCLEROSIS, LEFT: Primary | ICD-10-CM

## 2021-11-11 DIAGNOSIS — H52.7 REFRACTIVE ERROR: ICD-10-CM

## 2021-11-11 DIAGNOSIS — H40.053 OHT (OCULAR HYPERTENSION), BILATERAL: ICD-10-CM

## 2021-11-11 DIAGNOSIS — H26.491 PCO (POSTERIOR CAPSULAR OPACIFICATION), RIGHT: ICD-10-CM

## 2021-11-11 DIAGNOSIS — H25.042 POSTERIOR SUBCAPSULAR AGE-RELATED CATARACT OF LEFT EYE: ICD-10-CM

## 2021-11-11 DIAGNOSIS — Z96.1 PSEUDOPHAKIA: ICD-10-CM

## 2021-11-11 DIAGNOSIS — I10 ESSENTIAL HYPERTENSION: ICD-10-CM

## 2021-11-11 PROCEDURE — 1126F AMNT PAIN NOTED NONE PRSNT: CPT | Mod: CPTII,S$GLB,, | Performed by: OPHTHALMOLOGY

## 2021-11-11 PROCEDURE — 1160F PR REVIEW ALL MEDS BY PRESCRIBER/CLIN PHARMACIST DOCUMENTED: ICD-10-PCS | Mod: CPTII,S$GLB,, | Performed by: OPHTHALMOLOGY

## 2021-11-11 PROCEDURE — 2023F DILAT RTA XM W/O RTNOPTHY: CPT | Mod: CPTII,S$GLB,, | Performed by: OPHTHALMOLOGY

## 2021-11-11 PROCEDURE — 1126F PR PAIN SEVERITY QUANTIFIED, NO PAIN PRESENT: ICD-10-PCS | Mod: CPTII,S$GLB,, | Performed by: OPHTHALMOLOGY

## 2021-11-11 PROCEDURE — 76514 ECHO EXAM OF EYE THICKNESS: CPT | Mod: S$GLB,,, | Performed by: OPHTHALMOLOGY

## 2021-11-11 PROCEDURE — 1101F PR PT FALLS ASSESS DOC 0-1 FALLS W/OUT INJ PAST YR: ICD-10-PCS | Mod: CPTII,S$GLB,, | Performed by: OPHTHALMOLOGY

## 2021-11-11 PROCEDURE — 3288F PR FALLS RISK ASSESSMENT DOCUMENTED: ICD-10-PCS | Mod: CPTII,S$GLB,, | Performed by: OPHTHALMOLOGY

## 2021-11-11 PROCEDURE — 92014 PR EYE EXAM, EST PATIENT,COMPREHESV: ICD-10-PCS | Mod: S$GLB,,, | Performed by: OPHTHALMOLOGY

## 2021-11-11 PROCEDURE — 3044F PR MOST RECENT HEMOGLOBIN A1C LEVEL <7.0%: ICD-10-PCS | Mod: CPTII,S$GLB,, | Performed by: OPHTHALMOLOGY

## 2021-11-11 PROCEDURE — 1101F PT FALLS ASSESS-DOCD LE1/YR: CPT | Mod: CPTII,S$GLB,, | Performed by: OPHTHALMOLOGY

## 2021-11-11 PROCEDURE — 76514 PR  US, EYE, FOR CORNEAL THICKNESS: ICD-10-PCS | Mod: S$GLB,,, | Performed by: OPHTHALMOLOGY

## 2021-11-11 PROCEDURE — 99499 UNLISTED E&M SERVICE: CPT | Mod: S$GLB,,, | Performed by: OPHTHALMOLOGY

## 2021-11-11 PROCEDURE — 1160F RVW MEDS BY RX/DR IN RCRD: CPT | Mod: CPTII,S$GLB,, | Performed by: OPHTHALMOLOGY

## 2021-11-11 PROCEDURE — 99999 PR PBB SHADOW E&M-EST. PATIENT-LVL IV: CPT | Mod: PBBFAC,,, | Performed by: OPHTHALMOLOGY

## 2021-11-11 PROCEDURE — 3044F HG A1C LEVEL LT 7.0%: CPT | Mod: CPTII,S$GLB,, | Performed by: OPHTHALMOLOGY

## 2021-11-11 PROCEDURE — 3288F FALL RISK ASSESSMENT DOCD: CPT | Mod: CPTII,S$GLB,, | Performed by: OPHTHALMOLOGY

## 2021-11-11 PROCEDURE — 92014 COMPRE OPH EXAM EST PT 1/>: CPT | Mod: S$GLB,,, | Performed by: OPHTHALMOLOGY

## 2021-11-11 PROCEDURE — 92136 BIOMETRY: ICD-10-PCS | Mod: LT,S$GLB,, | Performed by: OPHTHALMOLOGY

## 2021-11-11 PROCEDURE — 1159F PR MEDICATION LIST DOCUMENTED IN MEDICAL RECORD: ICD-10-PCS | Mod: CPTII,S$GLB,, | Performed by: OPHTHALMOLOGY

## 2021-11-11 PROCEDURE — 99999 PR PBB SHADOW E&M-EST. PATIENT-LVL IV: ICD-10-PCS | Mod: PBBFAC,,, | Performed by: OPHTHALMOLOGY

## 2021-11-11 PROCEDURE — 1159F MED LIST DOCD IN RCRD: CPT | Mod: CPTII,S$GLB,, | Performed by: OPHTHALMOLOGY

## 2021-11-11 PROCEDURE — 2023F PR DILATED RETINAL EXAM W/O EVID OF RETINOPATHY: ICD-10-PCS | Mod: CPTII,S$GLB,, | Performed by: OPHTHALMOLOGY

## 2021-11-11 PROCEDURE — 92136 OPHTHALMIC BIOMETRY: CPT | Mod: LT,S$GLB,, | Performed by: OPHTHALMOLOGY

## 2021-11-11 PROCEDURE — 99499 RISK ADDL DX/OHS AUDIT: ICD-10-PCS | Mod: S$GLB,,, | Performed by: OPHTHALMOLOGY

## 2021-11-15 ENCOUNTER — CLINICAL SUPPORT (OUTPATIENT)
Dept: REHABILITATION | Facility: HOSPITAL | Age: 71
End: 2021-11-15
Attending: ORTHOPAEDIC SURGERY
Payer: MEDICARE

## 2021-11-15 DIAGNOSIS — M25.511 CHRONIC RIGHT SHOULDER PAIN: ICD-10-CM

## 2021-11-15 DIAGNOSIS — Z74.09 IMPAIRED MOBILITY AND ADLS: ICD-10-CM

## 2021-11-15 DIAGNOSIS — M95.8 WINGING OF SCAPULA: ICD-10-CM

## 2021-11-15 DIAGNOSIS — G89.29 CHRONIC RIGHT SHOULDER PAIN: ICD-10-CM

## 2021-11-15 DIAGNOSIS — Z78.9 IMPAIRED MOBILITY AND ADLS: ICD-10-CM

## 2021-11-15 DIAGNOSIS — R29.898 WEAKNESS OF SHOULDER: ICD-10-CM

## 2021-11-15 PROCEDURE — 97110 THERAPEUTIC EXERCISES: CPT | Mod: PN

## 2021-11-15 PROCEDURE — 97162 PT EVAL MOD COMPLEX 30 MIN: CPT | Mod: PN

## 2021-11-22 DIAGNOSIS — E55.9 HYPOVITAMINOSIS D: ICD-10-CM

## 2021-11-22 RX ORDER — ERGOCALCIFEROL 1.25 MG/1
CAPSULE ORAL
Qty: 12 CAPSULE | Refills: 2 | Status: SHIPPED | OUTPATIENT
Start: 2021-11-22 | End: 2022-08-09

## 2021-12-03 ENCOUNTER — CLINICAL SUPPORT (OUTPATIENT)
Dept: REHABILITATION | Facility: HOSPITAL | Age: 71
End: 2021-12-03
Attending: ORTHOPAEDIC SURGERY
Payer: MEDICARE

## 2021-12-03 DIAGNOSIS — Z74.09 IMPAIRED MOBILITY AND ADLS: ICD-10-CM

## 2021-12-03 DIAGNOSIS — I10 ESSENTIAL HYPERTENSION: ICD-10-CM

## 2021-12-03 DIAGNOSIS — I25.119 CORONARY ARTERY DISEASE INVOLVING NATIVE CORONARY ARTERY OF NATIVE HEART WITH ANGINA PECTORIS: ICD-10-CM

## 2021-12-03 DIAGNOSIS — M95.8 WINGING OF SCAPULA: ICD-10-CM

## 2021-12-03 DIAGNOSIS — R29.898 WEAKNESS OF SHOULDER: ICD-10-CM

## 2021-12-03 DIAGNOSIS — Z78.9 IMPAIRED MOBILITY AND ADLS: ICD-10-CM

## 2021-12-03 PROCEDURE — 97110 THERAPEUTIC EXERCISES: CPT | Mod: PN

## 2021-12-04 RX ORDER — ISOSORBIDE MONONITRATE 60 MG/1
60 TABLET, EXTENDED RELEASE ORAL DAILY
Qty: 90 TABLET | Refills: 3 | Status: SHIPPED | OUTPATIENT
Start: 2021-12-04 | End: 2022-03-23 | Stop reason: SDUPTHER

## 2021-12-07 ENCOUNTER — IMMUNIZATION (OUTPATIENT)
Dept: OBSTETRICS AND GYNECOLOGY | Facility: CLINIC | Age: 71
End: 2021-12-07
Payer: MEDICARE

## 2021-12-07 DIAGNOSIS — Z23 NEED FOR VACCINATION: Primary | ICD-10-CM

## 2021-12-07 PROCEDURE — 0004A COVID-19, MRNA, LNP-S, PF, 30 MCG/0.3 ML DOSE VACCINE: CPT | Mod: PBBFAC | Performed by: FAMILY MEDICINE

## 2021-12-10 ENCOUNTER — TELEPHONE (OUTPATIENT)
Dept: FAMILY MEDICINE | Facility: CLINIC | Age: 71
End: 2021-12-10
Payer: MEDICARE

## 2021-12-10 ENCOUNTER — CLINICAL SUPPORT (OUTPATIENT)
Dept: REHABILITATION | Facility: HOSPITAL | Age: 71
End: 2021-12-10
Attending: ORTHOPAEDIC SURGERY
Payer: MEDICARE

## 2021-12-10 DIAGNOSIS — M95.8 WINGING OF SCAPULA: ICD-10-CM

## 2021-12-10 DIAGNOSIS — Z74.09 IMPAIRED MOBILITY AND ADLS: ICD-10-CM

## 2021-12-10 DIAGNOSIS — R29.898 WEAKNESS OF SHOULDER: ICD-10-CM

## 2021-12-10 DIAGNOSIS — Z78.9 IMPAIRED MOBILITY AND ADLS: ICD-10-CM

## 2021-12-10 PROCEDURE — 97110 THERAPEUTIC EXERCISES: CPT | Mod: PN

## 2021-12-13 ENCOUNTER — TELEPHONE (OUTPATIENT)
Dept: PAIN MEDICINE | Facility: CLINIC | Age: 71
End: 2021-12-13
Payer: MEDICARE

## 2021-12-17 ENCOUNTER — CLINICAL SUPPORT (OUTPATIENT)
Dept: REHABILITATION | Facility: HOSPITAL | Age: 71
End: 2021-12-17
Attending: ORTHOPAEDIC SURGERY
Payer: MEDICARE

## 2021-12-17 DIAGNOSIS — R29.898 WEAKNESS OF SHOULDER: ICD-10-CM

## 2021-12-17 DIAGNOSIS — M95.8 WINGING OF SCAPULA: ICD-10-CM

## 2021-12-17 DIAGNOSIS — Z78.9 IMPAIRED MOBILITY AND ADLS: ICD-10-CM

## 2021-12-17 DIAGNOSIS — Z74.09 IMPAIRED MOBILITY AND ADLS: ICD-10-CM

## 2021-12-17 PROCEDURE — 97140 MANUAL THERAPY 1/> REGIONS: CPT | Mod: PN

## 2021-12-17 PROCEDURE — 97110 THERAPEUTIC EXERCISES: CPT | Mod: PN

## 2022-01-03 ENCOUNTER — TELEPHONE (OUTPATIENT)
Dept: OPHTHALMOLOGY | Facility: CLINIC | Age: 72
End: 2022-01-03
Payer: MEDICARE

## 2022-01-03 NOTE — TELEPHONE ENCOUNTER
----- Message from Flex Cantu sent at 1/3/2022 10:58 AM CST -----  Contact: Misa @ [phone]    ----- Message -----  From: Ninoska Oropeza  Sent: 1/3/2022  10:47 AM CST  To: Gely CARIAS Staff    Pt wife calling regarding scheduling him for cat surgery

## 2022-01-07 ENCOUNTER — PATIENT OUTREACH (OUTPATIENT)
Dept: ADMINISTRATIVE | Facility: OTHER | Age: 72
End: 2022-01-07
Payer: MEDICARE

## 2022-01-07 NOTE — TELEPHONE ENCOUNTER
No new care gaps identified.  Powered by [employer] Reference number: [id number].   1/07/2022 11:35:40 AM CST

## 2022-01-10 ENCOUNTER — OFFICE VISIT (OUTPATIENT)
Dept: ORTHOPEDICS | Facility: CLINIC | Age: 72
End: 2022-01-10
Payer: MEDICARE

## 2022-01-10 VITALS
BODY MASS INDEX: 31.21 KG/M2 | HEIGHT: 67 IN | HEART RATE: 58 BPM | DIASTOLIC BLOOD PRESSURE: 72 MMHG | SYSTOLIC BLOOD PRESSURE: 128 MMHG | RESPIRATION RATE: 18 BRPM | WEIGHT: 198.88 LBS | OXYGEN SATURATION: 98 %

## 2022-01-10 DIAGNOSIS — M25.511 CHRONIC RIGHT SHOULDER PAIN: Primary | ICD-10-CM

## 2022-01-10 DIAGNOSIS — G89.29 CHRONIC RIGHT SHOULDER PAIN: Primary | ICD-10-CM

## 2022-01-10 PROCEDURE — 1160F RVW MEDS BY RX/DR IN RCRD: CPT | Mod: CPTII,S$GLB,, | Performed by: ORTHOPAEDIC SURGERY

## 2022-01-10 PROCEDURE — 1101F PR PT FALLS ASSESS DOC 0-1 FALLS W/OUT INJ PAST YR: ICD-10-PCS | Mod: CPTII,S$GLB,, | Performed by: ORTHOPAEDIC SURGERY

## 2022-01-10 PROCEDURE — 1101F PT FALLS ASSESS-DOCD LE1/YR: CPT | Mod: CPTII,S$GLB,, | Performed by: ORTHOPAEDIC SURGERY

## 2022-01-10 PROCEDURE — 3072F LOW RISK FOR RETINOPATHY: CPT | Mod: CPTII,S$GLB,, | Performed by: ORTHOPAEDIC SURGERY

## 2022-01-10 PROCEDURE — 3008F PR BODY MASS INDEX (BMI) DOCUMENTED: ICD-10-PCS | Mod: CPTII,S$GLB,, | Performed by: ORTHOPAEDIC SURGERY

## 2022-01-10 PROCEDURE — 1159F PR MEDICATION LIST DOCUMENTED IN MEDICAL RECORD: ICD-10-PCS | Mod: CPTII,S$GLB,, | Performed by: ORTHOPAEDIC SURGERY

## 2022-01-10 PROCEDURE — 99999 PR PBB SHADOW E&M-EST. PATIENT-LVL V: CPT | Mod: PBBFAC,,, | Performed by: ORTHOPAEDIC SURGERY

## 2022-01-10 PROCEDURE — 3288F PR FALLS RISK ASSESSMENT DOCUMENTED: ICD-10-PCS | Mod: CPTII,S$GLB,, | Performed by: ORTHOPAEDIC SURGERY

## 2022-01-10 PROCEDURE — 3078F DIAST BP <80 MM HG: CPT | Mod: CPTII,S$GLB,, | Performed by: ORTHOPAEDIC SURGERY

## 2022-01-10 PROCEDURE — 3074F PR MOST RECENT SYSTOLIC BLOOD PRESSURE < 130 MM HG: ICD-10-PCS | Mod: CPTII,S$GLB,, | Performed by: ORTHOPAEDIC SURGERY

## 2022-01-10 PROCEDURE — 1125F AMNT PAIN NOTED PAIN PRSNT: CPT | Mod: CPTII,S$GLB,, | Performed by: ORTHOPAEDIC SURGERY

## 2022-01-10 PROCEDURE — 99213 OFFICE O/P EST LOW 20 MIN: CPT | Mod: S$GLB,,, | Performed by: ORTHOPAEDIC SURGERY

## 2022-01-10 PROCEDURE — 3074F SYST BP LT 130 MM HG: CPT | Mod: CPTII,S$GLB,, | Performed by: ORTHOPAEDIC SURGERY

## 2022-01-10 PROCEDURE — 3072F PR LOW RISK FOR RETINOPATHY: ICD-10-PCS | Mod: CPTII,S$GLB,, | Performed by: ORTHOPAEDIC SURGERY

## 2022-01-10 PROCEDURE — 99999 PR PBB SHADOW E&M-EST. PATIENT-LVL V: ICD-10-PCS | Mod: PBBFAC,,, | Performed by: ORTHOPAEDIC SURGERY

## 2022-01-10 PROCEDURE — 1125F PR PAIN SEVERITY QUANTIFIED, PAIN PRESENT: ICD-10-PCS | Mod: CPTII,S$GLB,, | Performed by: ORTHOPAEDIC SURGERY

## 2022-01-10 PROCEDURE — 1159F MED LIST DOCD IN RCRD: CPT | Mod: CPTII,S$GLB,, | Performed by: ORTHOPAEDIC SURGERY

## 2022-01-10 PROCEDURE — 3078F PR MOST RECENT DIASTOLIC BLOOD PRESSURE < 80 MM HG: ICD-10-PCS | Mod: CPTII,S$GLB,, | Performed by: ORTHOPAEDIC SURGERY

## 2022-01-10 PROCEDURE — 3288F FALL RISK ASSESSMENT DOCD: CPT | Mod: CPTII,S$GLB,, | Performed by: ORTHOPAEDIC SURGERY

## 2022-01-10 PROCEDURE — 3008F BODY MASS INDEX DOCD: CPT | Mod: CPTII,S$GLB,, | Performed by: ORTHOPAEDIC SURGERY

## 2022-01-10 PROCEDURE — 1160F PR REVIEW ALL MEDS BY PRESCRIBER/CLIN PHARMACIST DOCUMENTED: ICD-10-PCS | Mod: CPTII,S$GLB,, | Performed by: ORTHOPAEDIC SURGERY

## 2022-01-10 PROCEDURE — 99213 PR OFFICE/OUTPT VISIT, EST, LEVL III, 20-29 MIN: ICD-10-PCS | Mod: S$GLB,,, | Performed by: ORTHOPAEDIC SURGERY

## 2022-01-10 RX ORDER — BLOOD SUGAR DIAGNOSTIC
STRIP MISCELLANEOUS
Qty: 300 STRIP | Refills: 3 | Status: SHIPPED | OUTPATIENT
Start: 2022-01-10 | End: 2023-05-29

## 2022-01-10 NOTE — TELEPHONE ENCOUNTER
Refill Authorization Note   Driss Hernandez  is requesting a refill authorization.  Brief Assessment and Rationale for Refill:  Approve     Medication Therapy Plan:  approve     Medication Reconciliation Completed: No   Comments:   --->Care Gap information included below if applicable.       Requested Prescriptions   Pending Prescriptions Disp Refills    ACCU-CHEK MOIZ PLUS TEST STRP Strp [Pharmacy Med Name: ACCU-CHEK MOIZ PLUS   Strip] 300 strip 3     Sig: TEST THREE TIMES DAILY       Endocrinology: Diabetes - Supplies Passed - 1/10/2022  2:19 PM        Passed - Patient is at least 18 years old        Passed - Valid encounter within last 15 months     Recent Visits  Date Type Provider Dept   10/29/21 Office Visit [facility]   07/07/21 Office Visit MD [facility]   03/30/21 Office Visit MD [facility]   08/18/20 Office Visit Jono Willoughby MD [facility]/ Internal Med/ Peds   Showing recent visits within past 720 days and meeting all other requirements  Future Appointments  No visits were found meeting these conditions.  Showing future appointments within next 150 days and meeting all other requirements      Future Appointments              In 3 months MI[facility] - Orthoped[facility], [facility] [place] -                 Passed - HBA1C within 1080 days     Lab Results   Component Value Date    HGBA1C 6.9 (H) 09/15/2021    HGBA1C 6.8 (H) 05/10/2021    HGBA1C 6.2 (H) 12/28/2020                  Appointments  past 12m or future 3m with PCP    Date Provider   Last Visit   10/29/2021 Jono Willoughby MD   Next Visit   Visit date not found Jono Willoughby MD   ED visits in past 90 days: 0     Note composed:2:20 PM 01/10/2022

## 2022-01-10 NOTE — PROGRESS NOTES
Chief Complaint   Patient presents with    Right Shoulder - Pain        HPI (10/5/21): Driss Hernandez Jr. is a 71 y.o. male who presents today complaining of right shoulder pain  Duration of symptoms:  About 6 months  Initially treated for cervical spine issues - no relief, patient believes pain is coming from the shoulder   Trauma or new activity: no  Pain is constant with progressive worsening throughout the day   Aggravating factors: has pain after activity  Does not have pain with reaching overhead, reaching behind his back etc  Has been doing some post melissa construction and he does not find this bothers him too much. Notes the pain more when he is at rest after work   First few months of pain he would feel better if he put his arm up over his head   Relieving factors: rest   Night pain is present and is disruptive to sleep  Radicular symptoms: no numbness, paresthesias   Pain radiates down arm but does not go past the elbow  Pain is localized to anterior shoulder   Prior treatment:  prescription NSAIDs (celebrex) with improvement in pain with doubled dose (done with ok of his PCP)  Pain does interfere with activities of daily living .    10/14/21  Still having pain   Says he lifted a 400 lb cabinet yesterday  He thinks his issues are correlated with coumadin use and multiple sticks for INR levels    1/10/22  Still having pain  Disruptive to sleep   Can sleep some with tizanidine   Not nearly as bad during the day   Only about 4-5 days of relief with the last injection   Therapy has been scheduled inconsistently   Was not compliant with HEP   Still doing a lot of heavylifting   Does not believe therapy will help, we have had this discussion multiple times and I have not be able to convince him otherwise   Reports biceps pain  As well as subdeltoid fossa pain, pain with reaching out in front and overhead     This is the extent of the patient's complaints at this time.     Hand dominance: Right    Review of  "Systems   All other systems reviewed and are negative.        Review of patient's allergies indicates:   Allergen Reactions    Penicillins Other (See Comments)     Unknown reaction, Had a reaction as a child    Shrimp Itching     Hand itching         Physical Exam:   Vitals:    01/10/22 1019   BP: 128/72   Pulse: (!) 58   Resp: 18   SpO2: 98%   Weight: 90.2 kg (198 lb 13.7 oz)   Height: 5' 7" (1.702 m)   PainSc:   4   PainLoc: Shoulder       General: Weight: 90.2 kg (198 lb 13.7 oz) Body mass index is 31.15 kg/m².  Patient is alert, awake and oriented to time, place and person. Mood and affect are appropriate.  Patient does not appear to be in any distress, denies any constitutional symptoms and appears stated age.   HEENT: Pupils are equal and round, sclera are not injected. External examination of ears and nose reveals no abnormalities. Cranial nerves II-X are grossly intact  Skin: no rashes, abrasions or open wounds on the affected extremity   Resp: No respiratory distress or audible wheezing   CV: 2+ pulses, all extremities warm and well perfused   Right Shoulder    Shoulder Range of Motion    Right     Left   (Active/Passive)       Forward Elevation     165/165            165/165  External rotation (arm at side)  45/45             45/45   Internal rotation behind the back  L5             L5     Range of motion is painful     Scapular winging no  Scapular dyskinesia yes    Acromioclavicular joint is tender  Crossbody test: negative    Neer's positive  Hawkin's positive    Donna's positive  Drop arm negative  Belly press negative      Cuff Strength     Right     Left   Supraspinatus        4/5    5/5  Infraspinatus     5/5    5/5  Subscapularis     5/5    5/5    Deltoid testing            5/5    5/5    Speeds negative  Yergasons negative    Elbow examination demonstrates no tenderness to palpation and has normal range of motion.     ltsi C5-T1  + epl, io, fds, fdp   2+ RP      Imaging: 3 views of the right " shoulder:  positive for degenerative changes of the AC joint. The humeral head is well centered on the AP and axillary views.  There is calcification and cystic changes at the rotator cuff insertion on the greater tuberosity. There is not significant degenerative change of the glenohumeral joint or posterior subluxation of the humeral head. No acute changes or fracture.      I personally reviewed and interpreted the patient's imaging obtained today in clinic     Assessment: 71 y.o. male with right rotator cuff tendinitis    I explained my diagnostic impression and the reasoning behind it in detail, using layman's terms.  Models and/or pictures were used to help in the explanation.  We discussed non operative and operative treatment modalities     Plan:   - Celebrex - gets good relief   - RTC in may     All questions were answered in detail. The patient is in full agreement with the treatment plan and will proceed accordingly.      This note was created by combination of typed  and M-Modal dictation. Transcription and phonetic errors may be present.  If there are any questions, please contact me.      Current Outpatient Medications:     (Magic mouthwash) 1:1:1 Benadryl 12.5mg/5ml liq, aluminum & magnesium hydroxide-simehticone (Maalox), LIDOcaine viscous 2%, Swish and spit 5 mLs every 4 (four) hours as needed. for mouth sores, Disp: 450 mL, Rfl: 12    ACCU-CHEK MOIZ CONTROL SOLN Soln, , Disp: , Rfl:     ACCU-CHEK MOIZ PLUS TEST STRP Strp, TEST THREE TIMES DAILY, Disp: 300 strip, Rfl: 12    ACCU-CHEK SOFTCLIX LANCETS Misc, , Disp: , Rfl:     amLODIPine (NORVASC) 10 MG tablet, TAKE 1 TABLET(10 MG) BY MOUTH EVERY DAY, Disp: 30 tablet, Rfl: 12    apixaban (ELIQUIS) 5 mg Tab, Take 1 tablet (5 mg total) by mouth 2 (two) times daily., Disp: 180 tablet, Rfl: 3    ascorbic acid, vitamin C, (VITAMIN C) 100 MG tablet, Take 100 mg by mouth daily as needed., Disp: , Rfl:     atorvastatin (LIPITOR) 40 MG  "tablet, TAKE 1 TABLET EVERY MORNING, Disp: 90 tablet, Rfl: 12    b complex vitamins tablet, Take 1 tablet by mouth once daily., Disp: , Rfl:     BD ALCOHOL SWABS PadM, , Disp: , Rfl:     BD ULTRA-FINE ROLAND PEN NEEDLES 32 gauge x 5/32" Ndle, , Disp: , Rfl:     celecoxib (CELEBREX) 100 MG capsule, Take 1 capsule (100 mg total) by mouth once daily., Disp: 30 capsule, Rfl: 6    ergocalciferol (ERGOCALCIFEROL) 50,000 unit Cap, TAKE ONE CAPSULE BY MOUTH WEEKLY, Disp: 12 capsule, Rfl: 2    fenofibrate 160 MG Tab, TAKE 1 TABLET (160 MG TOTAL) BY MOUTH EVERY MORNING., Disp: 90 tablet, Rfl: 4    FLUZONE HIGHDOSE QUAD 20-21  mcg/0.7 mL Syrg, TO BE ADMINISTERED HERE BY PHARMACIST, Disp: , Rfl:     gabapentin (NEURONTIN) 100 MG capsule, TAKE 2 CAPSULES IN THE MORNING AND 3 CAPSULES WITH DINNER (Patient taking differently: Take 300 mg by mouth every evening. TAKE 2 CAPSULES IN THE MORNING AND 3 CAPSULES WITH DINNER), Disp: 450 capsule, Rfl: 0    glimepiride (AMARYL) 2 MG tablet, TAKE 1 TABLET EVERY DAY BEFORE BREAKFAST, Disp: 90 tablet, Rfl: 0    insulin degludec (TRESIBA FLEXTOUCH U-100) 100 unit/mL (3 mL) insulin pen, Inject 30 Units into the skin once daily., Disp: , Rfl:     isosorbide mononitrate (IMDUR) 60 MG 24 hr tablet, Take 1 tablet (60 mg total) by mouth once daily., Disp: 90 tablet, Rfl: 3    LEXISCAN 0.4 mg/5 mL Syrg, , Disp: , Rfl:     LIDOCAINE VISCOUS 2 % solution, , Disp: , Rfl:     M-DRYL 12.5 mg/5 mL liquid, SWISH AND SPIT 5 ML BY MOUTH EVERY 4 HOURS AS NEEDED FOR MOUTH SORES, Disp: , Rfl:     magnesium 250 mg Tab, Take 1 tablet by mouth once daily. , Disp: , Rfl:     metFORMIN (GLUCOPHAGE-XR) 500 MG ER 24hr tablet, TAKE 2 TABLETS TWICE DAILY WITH MEALS., Disp: 360 tablet, Rfl: 2    metoprolol succinate (TOPROL-XL) 50 MG 24 hr tablet, , Disp: , Rfl:     omega-3 acid ethyl esters (LOVAZA) 1 gram capsule, Take 2 capsules (2 g total) by mouth 2 (two) times daily., Disp: 120 capsule, Rfl: " 12    pantoprazole (PROTONIX) 40 MG tablet, Take 1 tablet (40 mg total) by mouth once daily., Disp: 90 tablet, Rfl: 3    semaglutide (OZEMPIC) 1 mg/dose (4 mg/3 mL), Inject 1 mg into the skin every 7 days., Disp: 1 pen, Rfl: 11    tiZANidine (ZANAFLEX) 4 MG tablet, Take 4 mg by mouth every 6 (six) hours as needed., Disp: , Rfl:     tiZANidine (ZANAFLEX) 4 MG tablet, Take 4 tablets (16 mg total) by mouth every evening., Disp: 360 tablet, Rfl: 3    triazolam (HALCION) 0.25 MG Tab, TK 1 T PO QHS, prn, Disp: 30 tablet, Rfl: 3    clopidogreL (PLAVIX) 75 mg tablet, Take 1 tablet (75 mg total) by mouth once daily. 8 pills the first day, Disp: 38 tablet, Rfl: 11    nitroGLYCERIN (NITROSTAT) 0.4 MG SL tablet, Place 1 tablet (0.4 mg total) under the tongue every 5 (five) minutes as needed for Chest pain., Disp: 25 tablet, Rfl: 11    Past Medical History:   Diagnosis Date    Chronic midline low back pain without sciatica 10/2/2017    Colon polyps     Coronary artery disease involving native coronary artery of native heart 3/5/2020    Coronary artery disease involving native coronary artery of native heart with angina pectoris 12/10/2020    Diabetes mellitus with neurological manifestations, uncontrolled 1/24/2017    Diabetic polyneuropathy associated with type 2 diabetes mellitus 1/24/2017    Diabetic polyneuropathy associated with type 2 diabetes mellitus     Essential hypertension 1/24/2017    Gastroesophageal reflux disease 1/24/2017    Hyperlipidemia 1/24/2017    Insomnia 1/24/2017    Long-term insulin use 1/24/2017    Longstanding persistent atrial fibrillation 12/30/2020    Lumbar spondylosis 11/13/2017    Nuclear sclerosis of both eyes 8/24/2017    Obesity     Stage 3 chronic kidney disease 2/13/2019    Uncontrolled type 2 diabetes mellitus without complication, with long-term current use of insulin 1/24/2017       Active Problem List with Overview Notes    Diagnosis Date Noted    Weakness of  "shoulder 11/15/2021    Winging of scapula 11/15/2021    Impaired mobility and ADLs 11/15/2021    Cervical radiculopathy 06/04/2021    Cervical spondylosis 06/04/2021    DDD (degenerative disc disease), cervical 06/04/2021    Long term (current) use of anticoagulants 12/31/2020    Longstanding persistent atrial fibrillation 12/30/2020    Coronary artery disease involving native coronary artery of native heart with angina pectoris 12/10/2020    Pre-op testing 11/11/2020    Sedative dependence 08/18/2020    Abdominal aortic atherosclerosis 03/11/2020     "Moderate atherosclerosis is seen within the distal abdominal aorta" CTA Chest Non Coronary 10-      Chest pain, atypical 03/05/2020    Hx of CABG 03/05/2020     3 vessel WJ 2016      Coronary artery disease involving native coronary artery of native heart 03/05/2020    Diabetes mellitus due to underlying condition with stage 3 chronic kidney disease, with long-term current use of insulin 11/15/2019    Bilateral sacroiliitis 02/13/2019    Stage 3 chronic kidney disease 02/13/2019    Lumbosacral spondylosis 11/28/2018    Obesity (BMI 30.0-34.9) 04/26/2018    Pseudophakia 04/12/2018    Lumbar radiculopathy 11/29/2017    Lumbar spondylosis 11/13/2017    DDD (degenerative disc disease), lumbar 11/13/2017    Postlaminectomy syndrome of lumbar region 11/13/2017    Lumbar radiculopathy, right 11/13/2017    Chronic midline low back pain without sciatica 10/02/2017    Senile nuclear sclerosis 08/29/2017    Nuclear sclerosis, left 08/24/2017    Posterior subcapsular age-related cataract of both eyes 08/24/2017    Refractive error 08/24/2017    Chronic right shoulder pain 08/22/2017    Decreased strength of upper extremity 08/22/2017    Decreased range of motion (ROM) of shoulder 08/22/2017    Posture imbalance 08/22/2017    DM type 2 without retinopathy 01/24/2017    Diabetes mellitus with neurological manifestations, uncontrolled " 01/24/2017    Insomnia 01/24/2017    Gastroesophageal reflux disease 01/24/2017    Long-term insulin use 01/24/2017    Hyperlipidemia 01/24/2017    Essential hypertension 01/24/2017    Diabetic polyneuropathy associated with type 2 diabetes mellitus 01/24/2017       Past Surgical History:   Procedure Laterality Date    BACK SURGERY      2002    CATARACT EXTRACTION W/  INTRAOCULAR LENS IMPLANT Right 08/29/2017    Dr. Hong    COLONOSCOPY N/A 2/21/2017    Procedure: COLONOSCOPY;  Surgeon: Robb Rosado MD;  Location: [facility] ENDO;  Service: Endoscopy;  Laterality: N/A;    CORONARY ANGIOGRAPHY INCLUDING BYPASS GRAFTS WITH CATHETERIZATION OF LEFT HEART N/A 11/11/2020    Procedure: ANGIOGRAM, CORONARY, INCLUDING BYPASS GRAFT, WITH LEFT HEART CATHETERIZATION;  Surgeon: Lane Cuellar MD;  Location: [facility] CATH LAB;  Service: Cardiology;  Laterality: N/A;  RN PRE OP 11-5-2020. --COVID NEGATIVE ON  11-. C A    CORONARY ARTERY BYPASS GRAFT      March 2016    EPIDURAL STEROID INJECTION Bilateral 11/14/2018    Procedure: Lumbar Medial Branch Blocks;  Surgeon: Eze Byrd Jr., MD;  Location: [facility];  Service: Pain Management;  Laterality: Bilateral;  Bilateral Lumbar Medial Branch Blocks L2, L3, L4, L5    58840  [id number]    Arrive @ 1150; NO Sedation    EPIDURAL STEROID INJECTION Bilateral 11/28/2018    Procedure: Injection, Steroid, Epidural;  Surgeon: Eze Byrd Jr., MD;  Location: [facility];  Service: Pain Management;  Laterality: Bilateral;  Bilateral Sacroiliac Joint Steroid Injections    96931    Arrive @ 0910    EPIDURAL STEROID INJECTION Bilateral 3/20/2019    Procedure: Injection, Steroid, Sacroiliiac Joint;  Surgeon: Eze Byrd Jr., MD;  Location: [facility];  Service: Pain Management;  Laterality: Bilateral;  Bilateral Sacroiliac Joint Steroid Injections    [id number]    Arrive @ 1145; Trigger point injections also?    TONSILLECTOMY         Social History      Socioeconomic History    Marital status:    Tobacco Use    Smoking status: Never Smoker    Smokeless tobacco: Never Used   Substance and Sexual Activity    Alcohol use: Yes     Comment: SOCIALLY    Drug use: No    Sexual activity: Yes     Partners: Female     Social Determinants of Health     Financial Resource Strain: Low Risk     Difficulty of Paying Living Expenses: Not very hard   Food Insecurity: No Food Insecurity    Worried About Running Out of Food in the Last Year: Never true    Ran Out of Food in the Last Year: Never true   Transportation Needs: No Transportation Needs    Lack of Transportation (Medical): No    Lack of Transportation (Non-Medical): No   Physical Activity: Unknown    Days of Exercise per Week: 3 days   Stress: Stress Concern Present    Feeling of Stress : To some extent   Social Connections: Unknown    Frequency of Communication with Friends and Family: More than three times a week    Frequency of Social Gatherings with Friends and Family: Three times a week    Active Member of Clubs or Organizations: Yes    Attends Club or Organization Meetings: More than 4 times per year    Marital Status:    Housing Stability: Low Risk     Unable to Pay for Housing in the Last Year: No    Number of Places Lived in the Last Year: 1    Unstable Housing in the Last Year: No

## 2022-01-11 ENCOUNTER — PATIENT MESSAGE (OUTPATIENT)
Dept: FAMILY MEDICINE | Facility: CLINIC | Age: 72
End: 2022-01-11
Payer: MEDICARE

## 2022-01-12 ENCOUNTER — PATIENT MESSAGE (OUTPATIENT)
Dept: FAMILY MEDICINE | Facility: CLINIC | Age: 72
End: 2022-01-12
Payer: MEDICARE

## 2022-01-12 RX ORDER — INFLUENZA VACCINE, ADJUVANTED 15; 15; 15; 15 UG/.5ML; UG/.5ML; UG/.5ML; UG/.5ML
INJECTION, SUSPENSION INTRAMUSCULAR
COMMUNITY
Start: 2021-10-29 | End: 2022-02-16 | Stop reason: CLARIF

## 2022-01-12 RX ORDER — BETAMETHASONE SODIUM PHOSPHATE AND BETAMETHASONE ACETATE 3; 3 MG/ML; MG/ML
INJECTION, SUSPENSION INTRA-ARTICULAR; INTRALESIONAL; INTRAMUSCULAR; SOFT TISSUE
COMMUNITY
Start: 2021-08-19 | End: 2022-03-21

## 2022-01-12 RX ORDER — TRIAMCINOLONE ACETONIDE 40 MG/ML
INJECTION, SUSPENSION INTRA-ARTICULAR; INTRAMUSCULAR
COMMUNITY
Start: 2021-10-14 | End: 2022-02-16 | Stop reason: CLARIF

## 2022-01-12 NOTE — TELEPHONE ENCOUNTER
Care Due:                  Date            Visit Type   Department     Provider  --------------------------------------------------------------------------------                                [facility]                              FOLLOWUP/OF  MED/ INTERNAL  Jono Samuel  Last Visit: 10-      FICE VISIT   MED/ PEDS      [place]ren[place]  Next Visit: None Scheduled  None         None Found                                                            Last  Test          Frequency    Reason                     Performed    Due Date  --------------------------------------------------------------------------------    CBC.........  12 months..  fenofibrate..............  12- 12-    CMP.........  12 months..  atorvastatin,              Not Found    Overdue                             fenofibrate, omega-3.....    Lipid Panel.  12 months..  atorvastatin,              Not Found    Overdue                             fenofibrate, omega-3.....    Powered by [employer]. Reference number: [id number].   1/12/2022 9:04:10 AM CST

## 2022-01-13 ENCOUNTER — TELEPHONE (OUTPATIENT)
Dept: ORTHOPEDICS | Facility: CLINIC | Age: 72
End: 2022-01-13
Payer: MEDICARE

## 2022-01-13 ENCOUNTER — PATIENT MESSAGE (OUTPATIENT)
Dept: ORTHOPEDICS | Facility: CLINIC | Age: 72
End: 2022-01-13
Payer: MEDICARE

## 2022-01-13 ENCOUNTER — TELEPHONE (OUTPATIENT)
Dept: OPHTHALMOLOGY | Facility: CLINIC | Age: 72
End: 2022-01-13
Payer: MEDICARE

## 2022-01-13 DIAGNOSIS — H25.12 NUCLEAR SCLEROTIC CATARACT OF LEFT EYE: Primary | ICD-10-CM

## 2022-01-13 RX ORDER — TRIAZOLAM 0.25 MG/1
TABLET ORAL
Qty: 30 TABLET | Refills: 3 | OUTPATIENT
Start: 2022-01-13

## 2022-01-13 RX ORDER — NEPAFENAC 3 MG/ML
1 SUSPENSION/ DROPS OPHTHALMIC DAILY
Qty: 3 ML | Refills: 1 | Status: SHIPPED | OUTPATIENT
Start: 2022-02-21 | End: 2022-03-23

## 2022-01-13 RX ORDER — DIFLUPREDNATE OPHTHALMIC 0.5 MG/ML
1 EMULSION OPHTHALMIC 4 TIMES DAILY
Qty: 5 ML | Refills: 1 | Status: SHIPPED | OUTPATIENT
Start: 2022-02-24 | End: 2022-01-21

## 2022-01-13 RX ORDER — CELECOXIB 100 MG/1
100 CAPSULE ORAL DAILY
Qty: 30 CAPSULE | Refills: 6 | Status: SHIPPED | OUTPATIENT
Start: 2022-01-13 | End: 2023-01-23

## 2022-01-13 RX ORDER — CELECOXIB 100 MG/1
100 CAPSULE ORAL DAILY
Qty: 30 CAPSULE | Refills: 6 | Status: SHIPPED | OUTPATIENT
Start: 2022-01-13 | End: 2022-04-06

## 2022-01-13 RX ORDER — OFLOXACIN 3 MG/ML
1 SOLUTION/ DROPS OPHTHALMIC 4 TIMES DAILY
Qty: 5 ML | Refills: 1 | Status: SHIPPED | OUTPATIENT
Start: 2022-02-21 | End: 2022-03-03

## 2022-01-14 ENCOUNTER — TELEPHONE (OUTPATIENT)
Dept: ENDOCRINOLOGY | Facility: CLINIC | Age: 72
End: 2022-01-14
Payer: MEDICARE

## 2022-01-14 ENCOUNTER — TELEPHONE (OUTPATIENT)
Dept: OPHTHALMOLOGY | Facility: CLINIC | Age: 72
End: 2022-01-14
Payer: MEDICARE

## 2022-01-14 ENCOUNTER — TELEPHONE (OUTPATIENT)
Dept: FAMILY MEDICINE | Facility: CLINIC | Age: 72
End: 2022-01-14
Payer: MEDICARE

## 2022-01-14 NOTE — TELEPHONE ENCOUNTER
----- Message from Sofia Jacobo sent at 1/14/2022 11:26 AM CST -----      Name of Who is Calling: FREDY ROLLE JR. [[id number]]      What is the request in detail: Pt called regarding paperwork that was dropped off and needs it back was checking the status.Please contact to further discuss and advise.          Can the clinic reply by [facility]: N      What Number to Call Back if not in [facility]: Wife [phone]

## 2022-01-14 NOTE — TELEPHONE ENCOUNTER
----- Message from Yissel Low sent at 1/14/2022  9:08 AM CST -----  Contact: Pt @ [phone]  Pt is calling about the script Ilevro. His pharmacy has told him it will cost $900. Pt says he is already paying a lot for a different script he takes. H wants to know if there is an alternative the dr can prescribe? Pls call pt back to assist further.

## 2022-01-14 NOTE — TELEPHONE ENCOUNTER
Rt patients call and informed paper was received and will be faxed today .    Faxed twice to [facility].

## 2022-01-21 ENCOUNTER — TELEPHONE (OUTPATIENT)
Dept: OPHTHALMOLOGY | Facility: CLINIC | Age: 72
End: 2022-01-21
Payer: MEDICARE

## 2022-01-21 DIAGNOSIS — H25.12 NS (NUCLEAR SCLEROSIS), LEFT: Primary | ICD-10-CM

## 2022-01-21 RX ORDER — PREDNISOLONE ACETATE 10 MG/ML
1 SUSPENSION/ DROPS OPHTHALMIC 4 TIMES DAILY
Qty: 5 ML | Refills: 1 | Status: SHIPPED | OUTPATIENT
Start: 2022-02-24 | End: 2022-03-26

## 2022-01-27 ENCOUNTER — TELEPHONE (OUTPATIENT)
Dept: ENDOCRINOLOGY | Facility: CLINIC | Age: 72
End: 2022-01-27
Payer: MEDICARE

## 2022-01-27 NOTE — TELEPHONE ENCOUNTER
Rt call to number listed in previous note , caller answered stating their name was David . He didn't call the office .

## 2022-01-27 NOTE — TELEPHONE ENCOUNTER
----- Message from Ale Hernandez sent at 1/27/2022 10:39 AM CST -----  Type: Patient Call Back       What is the request in detail:  Pt calling to speak to a nurse regarding paper work.      Can the clinic reply by [facility]? No       Would the patient rather a call back or a response via [facility]? Call back       Best call back number: [phone]        Thank you.

## 2022-02-08 RX ORDER — GLIMEPIRIDE 2 MG/1
TABLET ORAL
Qty: 90 TABLET | Refills: 0 | Status: SHIPPED | OUTPATIENT
Start: 2022-02-08 | End: 2022-02-18 | Stop reason: SDUPTHER

## 2022-02-08 RX ORDER — METFORMIN HYDROCHLORIDE 500 MG/1
TABLET, EXTENDED RELEASE ORAL
Qty: 360 TABLET | Refills: 0 | Status: SHIPPED | OUTPATIENT
Start: 2022-02-08 | End: 2022-02-18 | Stop reason: SDUPTHER

## 2022-02-08 NOTE — TELEPHONE ENCOUNTER
Spoke to patient, scheduled appointment and CGM Study. Patient will take printed orders to get his labs done at [facility] when he comes for the CGM on apt on 2/11/22.

## 2022-02-09 ENCOUNTER — TELEPHONE (OUTPATIENT)
Dept: ENDOCRINOLOGY | Facility: CLINIC | Age: 72
End: 2022-02-09
Payer: MEDICARE

## 2022-02-09 NOTE — TELEPHONE ENCOUNTER
----- Message from Karena Carter MA sent at 2/9/2022  1:39 PM CST -----  Type: Patient Call Back    Who called:wife    What is the request in detail:she is asking for a nurse to call her back.. the phone hung up before I could get any info..    Can the clinic reply by [facility]?no    Would the patient rather a call back or a response via [facility]? yes    Best call back number:[phone] (home)

## 2022-02-09 NOTE — TELEPHONE ENCOUNTER
Spoke to patient scheduled lab appointment, previously stated he wanted labs at [facility] and was going to give him printed orders at his CGM apt on 2/11. Pt decided to have labs drawn at [facility] instead

## 2022-02-10 ENCOUNTER — CLINICAL SUPPORT (OUTPATIENT)
Dept: REHABILITATION | Facility: HOSPITAL | Age: 72
End: 2022-02-10
Attending: ORTHOPAEDIC SURGERY
Payer: MEDICARE

## 2022-02-10 ENCOUNTER — LAB VISIT (OUTPATIENT)
Dept: LAB | Facility: HOSPITAL | Age: 72
End: 2022-02-10
Attending: NURSE PRACTITIONER
Payer: MEDICARE

## 2022-02-10 DIAGNOSIS — M95.8 WINGING OF SCAPULA: ICD-10-CM

## 2022-02-10 DIAGNOSIS — M25.511 CHRONIC RIGHT SHOULDER PAIN: ICD-10-CM

## 2022-02-10 DIAGNOSIS — R29.898 WEAKNESS OF SHOULDER: ICD-10-CM

## 2022-02-10 DIAGNOSIS — Z78.9 IMPAIRED MOBILITY AND ADLS: ICD-10-CM

## 2022-02-10 DIAGNOSIS — Z74.09 IMPAIRED MOBILITY AND ADLS: ICD-10-CM

## 2022-02-10 DIAGNOSIS — G89.29 CHRONIC RIGHT SHOULDER PAIN: ICD-10-CM

## 2022-02-10 LAB
CHOLEST SERPL-MCNC: 163 MG/DL (ref 120–199)
CHOLEST/HDLC SERPL: 6 {RATIO} (ref 2–5)
CREAT SERPL-MCNC: 1.4 MG/DL (ref 0.5–1.4)
EST. GFR  (AFRICAN AMERICAN): 57.6 ML/MIN/1.73 M^2
EST. GFR  (NON AFRICAN AMERICAN): 49.8 ML/MIN/1.73 M^2
ESTIMATED AVG GLUCOSE: 174 MG/DL (ref 68–131)
HBA1C MFR BLD: 7.7 % (ref 4–5.6)
HDLC SERPL-MCNC: 27 MG/DL (ref 40–75)
HDLC SERPL: 16.6 % (ref 20–50)
LDLC SERPL CALC-MCNC: 111.4 MG/DL (ref 63–159)
NONHDLC SERPL-MCNC: 136 MG/DL
TRIGL SERPL-MCNC: 123 MG/DL (ref 30–150)

## 2022-02-10 PROCEDURE — 97162 PT EVAL MOD COMPLEX 30 MIN: CPT | Mod: PN

## 2022-02-10 PROCEDURE — 80061 LIPID PANEL: CPT | Performed by: NURSE PRACTITIONER

## 2022-02-10 PROCEDURE — 97110 THERAPEUTIC EXERCISES: CPT | Mod: PN

## 2022-02-10 PROCEDURE — 36415 COLL VENOUS BLD VENIPUNCTURE: CPT | Mod: PO | Performed by: NURSE PRACTITIONER

## 2022-02-10 PROCEDURE — 83036 HEMOGLOBIN GLYCOSYLATED A1C: CPT | Performed by: NURSE PRACTITIONER

## 2022-02-10 PROCEDURE — 82565 ASSAY OF CREATININE: CPT | Performed by: NURSE PRACTITIONER

## 2022-02-10 NOTE — PROGRESS NOTES
"  Please See Full Physical Therapy Evaluation in Plan of Care                                                        Physical Therapy Initial Evaluation     Name: Driss Hernandez Jr.  Clinic Number: [id number]    Therapy Diagnosis:   Encounter Diagnoses   Name Primary?    Chronic right shoulder pain     Weakness of shoulder     Winging of scapula     Impaired mobility and ADLs      Physician: Valerie Olivera MD    Physician Orders: PT Eval and Treat   Medical Diagnosis from Referral: Chronic right shoulder pain  Evaluation Date: 2/10/2022  Authorization Period Expiration: 1/10/23  Plan of Care Expiration: 4/10/22  Visit # / Visits authorized: 1/ pending    Time In: 900  Time Out: 945  Total Billable Time: 45 minutes    Precautions: Fall    Functional Limitations Reports - G Codes  Category: Mobility  Tool: FOTO Shoulder Survey  Score: 57% Limitation     TREATMENT     Treatment Time In: 920  Treatment Time Out: 945  Total Treatment time separate from Evaluation: 25 minutes    Driss received therapeutic exercises to develop strength, endurance, ROM, flexibility, posture and core stabilization for 25 minutes including:    R Upper Trap Str 3x30"  Standing Rows RTB 20x  Shld IR/ER RTB 20x ea  Standing Horiz Abd RTB 20x    Home Exercises and Patient Education Provided    Education provided:   Written Home Exercises Provided: yes.  Exercises were reviewed and Driss was able to demonstrate them prior to the end of the session.  Driss demonstrated good  understanding of the education provided.     See EMR under Patient Instructions for exercises provided 2/10/2022.      Assessment     Pt's spiritual, cultural and educational needs considered and pt agreeable to plan of care and goals as stated below:     Short Term GOALS: 4 weeks. Pt agrees with goals set.  1. Patient demonstrates independence with HEP.   2. Patient demonstrates independence with Postural Awareness.   3. Patient demonstrates independence with body " mechanics.   4. Patient will report pain of 5/10 at worst, on 0-10 pain scale, with all activity  5. Pt will increase R GHJ AROM by 10 degrees in elevation plane to improve functional reach pain free.   6. Pt will increase UE strength by 1/3 muscle grade or greater on MMT to improve tolerance to all functional activities pain free.     Long Term Goals 8 Weeks. Pt agrees with goals set:  1. Pt will increase R GHJ ROM symmetrical to L GHJ to improve functional reach pain free.   2. Pt will increase UE strength to 4/5 to improve tolerance to all functional activities pain free.   3. Pt demonstrates improved function per FOTO Shoulder Survey to 50% Limitation or less.   4. Patient will report pain of 2/10 at worst, on 0-10 pain scale, with all activity  5. Patient demonstrates ability to lift 10# object to overhead shelf with R UE, proper movement pattern, no pain provocation    Plan     Plan of care Certification: 2/10/2022 to 4/10/22.    Outpatient Physical Therapy 2 times weekly for 8 weeks to include the following interventions: Cervical/Lumbar Traction, Electrical Stimulation IFC/NMES, Manual Therapy, Moist Heat/ Ice, Neuromuscular Re-ed, Patient Education, Self Care, Therapeutic Activities, Therapeutic Exercise, Ultrasound and Dry Needling. Pt may be seen by PTA as part of the rehabilitation team.    Akhil Guajardo, PT  2/10/2022    I have seen the patient, reviewed the therapist's plan of care, and I agree with the plan of care.      I certify the need for these services furnished under this plan of treatment and while under my care.     ___________________ ________ Physician/Referring Practitioner            ___________________________ Date of Signature

## 2022-02-10 NOTE — PLAN OF CARE
Physical Therapy Initial Evaluation     Name: Driss Hernandez Jr.  Clinic Number: [id number]    Therapy Diagnosis:   Encounter Diagnoses   Name Primary?    Chronic right shoulder pain     Weakness of shoulder     Winging of scapula     Impaired mobility and ADLs      Physician: Valerie Olivera MD    Physician Orders: PT Eval and Treat   Medical Diagnosis from Referral: Chronic right shoulder pain  Evaluation Date: 2/10/2022  Authorization Period Expiration: 1/10/23  Plan of Care Expiration: 4/10/22  Visit # / Visits authorized: 1/ pending    Time In: 900  Time Out: 945  Total Billable Time: 45 minutes    Precautions: Fall    Subjective       Medical History:   Past Medical History:   Diagnosis Date    Chronic midline low back pain without sciatica 10/2/2017    Colon polyps     Coronary artery disease involving native coronary artery of native heart 3/5/2020    Coronary artery disease involving native coronary artery of native heart with angina pectoris 12/10/2020    Diabetes mellitus with neurological manifestations, uncontrolled 1/24/2017    Diabetic polyneuropathy associated with type 2 diabetes mellitus 1/24/2017    Diabetic polyneuropathy associated with type 2 diabetes mellitus     Essential hypertension 1/24/2017    Gastroesophageal reflux disease 1/24/2017    Hyperlipidemia 1/24/2017    Insomnia 1/24/2017    Long-term insulin use 1/24/2017    Longstanding persistent atrial fibrillation 12/30/2020    Lumbar spondylosis 11/13/2017    Nuclear sclerosis of both eyes 8/24/2017    Obesity     Stage 3 chronic kidney disease 2/13/2019    Uncontrolled type 2 diabetes mellitus without complication, with long-term current use of insulin 1/24/2017       Surgical History:   Driss Hernandez Jr.  has a past surgical history that includes Colonoscopy (N/A, 2/21/2017); Coronary artery bypass graft; Back surgery; Tonsillectomy; Cataract  extraction w/  intraocular lens implant (Right, 08/29/2017); Epidural steroid injection (Bilateral, 11/14/2018); Epidural steroid injection (Bilateral, 11/28/2018); Epidural steroid injection (Bilateral, 3/20/2019); and Coronary angiography including bypass grafts with catheterization of left heart (N/A, 11/11/2020).    Medications:   Driss has a current medication list which includes the following prescription(s): diphenhydramine hcl, accu-chek tish control soln, accu-chek tish plus test strp, accu-chek softclix lancets, amlodipine, apixaban, ascorbic acid (vitamin c), atorvastatin, b complex vitamins, bd alcohol swabs, bd ultra-fine diego pen needle, betamethasone acetate-betamethasone sodium phosphate, celecoxib, celecoxib, clopidogrel, ergocalciferol, fenofibrate, fluad quad 2021-22(65y up)(pf), fluzone highdose quad 20-21 pf, gabapentin, glimepiride, [START ON 2/21/2022] ilevro, tresiba flextouch u-100, isosorbide mononitrate, kenalog, lexiscan, lidocaine viscous, m-dryl, magnesium, metformin, metoprolol succinate, nitroglycerin, [START ON 2/21/2022] ofloxacin, omega-3 acid ethyl esters, pantoprazole, [START ON 2/24/2022] prednisolone acetate, semaglutide, tizanidine, and triazolam.    Allergies:   Review of patient's allergies indicates:   Allergen Reactions    Penicillins Other (See Comments)     Unknown reaction, Had a reaction as a child    Shrimp Itching     Hand itching        Date of onset: chronic in nature > 1 year  History of current condition - Driss reports: Returns to clinic for first time since 12/17. Took break to follow-up with orthopedic surgeon, decision was made to continue with PT and hold surgery for now, wait time for PT Eval until now. Pain is worse at night. Typically doesn't have pain during day time.  Sharp pain primarily at superior GH joint line, lateral shoulder. He is feeling some pain at neck and into R elbow. Plan is to continue PT until possible surgical intervention in ~3  "months. He believes shoulder is getting worse. Fell carrying furniture yesterday, tripped over curb, and landed on his R buttocks. Denies loss of consciousness. Has a cyst in R shoulder which can complicate surgical intervention in future    Per MD Note  "1/10/22  Still having pain  Disruptive to sleep   Can sleep some with tizanidine   Not nearly as bad during the day   Only about 4-5 days of relief with the last injection   Therapy has been scheduled inconsistently   Was not compliant with HEP   Still doing a lot of heavy lifting   Does not believe therapy will help, we have had this discussion multiple times and I have not be able to convince him otherwise   Reports biceps pain  As well as subdeltoid fossa pain, pain with reaching out in front and overhead"     Imaging X-ray   Imaging: 3 views of the right shoulder:  positive for degenerative changes of the AC joint. The humeral head is well centered on the AP and axillary views.  There is calcification and cystic changes at the rotator cuff insertion on the greater tuberosity. There is not significant degenerative change of the glenohumeral joint or posterior subluxation of the humeral head. No acute changes or fracture.      MRI  "Severe supraspinatus and infraspinatus tendinosis with high-grade partial-thickness articular sided tear as well as a small full-thickness component.  Subacromial/subdeltoid bursitis and bursal inflammation  Intra-articular long head biceps tendinosis and type 5 slap tear of the glenoid labrum involving the biceps anchor.  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   Prior Level of Function: independent  Current Level of Function: independent     Pain:  Current " "4/10, worst 10/10, best 0/10   Location: right shoulder   Description: Sharp  Aggravating Factors: sleep, sitting, occasionally with use  Easing Factors: steroid injection     Patients goals: reduce pain, improve sleep     Objective     Posture: forward head, rounded shoulders  Dermatomes: intact  Myotomes: decreased R abd/ER  Palpation: TTP anterior GHJ at subacromial region, superior GHJ line, subdeltoid fossa    Cervical ROM Screen: Full ROM, pain-free    Shoulder Range of Motion:   ACTIVE ROM LEFT RIGHT   Flexion 150 140   Abduction 140 140   IR Functional T12 Functional L3   ER Functional T2 Functional T2     PASSIVE ROM LEFT RIGHT   Flexion 170 160   Abduction 160 170     STRENGTH LEFT RIGHT   Flexion 5/5 4+/5   Abduction 4/5 3+/5 p! superolateral GHJ   Extension 4/5 4/5   IR (neutral) 4+/5 4-/5   ER (neutral) 4-/5 3+/5 p!   Middle Trap 3+/5 3+/5   Lower Trap 3+/5 3+/5   Elbow Flexion 5/5 5/5     Joint Mobility: Mild GHJ    Scapular Control/Dyskinesis:    Normal / Subtle / Obvious   Left Subtle cervical/thoracic ext with overhead ROM   Right Subtle cervical/thoracic ext with overhead ROM     Special Tests:    Left Right   Empty Can (Supraspinatus) Negative Negative   Donna (Supraspinatus) Negative Positive   Fung Chele Negative Positive   Lift Off (Subscap) Negative Negative   Speed's Test Negative Negative     Functional Limitations Reports - G Codes  Category: Mobility  Tool: FOTO Shoulder Survey  Score: 57% Limitation     TREATMENT     Treatment Time In: 920  Treatment Time Out: 945  Total Treatment time separate from Evaluation: 25 minutes    Driss received therapeutic exercises to develop strength, endurance, ROM, flexibility, posture and core stabilization for 25 minutes including:    R Upper Trap Str 3x30"  Standing Rows RTB 20x  Shld IR/ER RTB 20x ea  Standing Horiz Abd RTB 20x    Home Exercises and Patient Education Provided    Education provided:   Written Home Exercises Provided: yes.  Exercises " were reviewed and Driss was able to demonstrate them prior to the end of the session.  Driss demonstrated good  understanding of the education provided.     See EMR under Patient Instructions for exercises provided 2/10/2022.      Assessment     Driss is a 71 y.o. male referred to outpatient Physical Therapy with a medical diagnosis of Chronic right shoulder pain. with signs and symptoms including: increased shoulder pain, decreased UE ROM, decreased UE Strength, soft tissue dysfunction, postural imbalance,impaired joint mobility, and decreased tolerance to functional activities. Pt with pain primarily located at anterior/superior R GHJ at joint line, subjective report of radiation from R cervical region down to R elbow, however no radiation of symptoms noted today. No exacerbation of symptoms in R shoulder with cervical ROM. Poor strength noted in abduction/ER consistent with rotator cuff pathology. Pt with recent fall yesterday with impact onto R buttocks, demonstrates no significant adverse effects from that incident. Pt with good motivation to perform physical activity and responds well to cueing.    Pt prognosis is Fair.  Pt will benefit from skilled outpatient physical therapy to address the above stated deficits, provide pt/family education and to maximize pt's level of independence.     Plan of care discussed with patient: Yes  Pt's spiritual, cultural and educational needs considered and patient is agreeable to the plan of care and goals as stated below:     Anticipated Barriers for therapy: COVID-19 Concerns    Medical Necessity is demonstrated by the following  History  Co-morbidities and personal factors that may impact the plan of care Co-morbidities:   LBP, Neck Pain, Stage III Kidney Disease,  Diabetic Polyneuropathy, Vertigo    Personal Factors:   no deficits     high   Examination  Body Structures and Functions, activity limitations and participation restrictions that may impact the plan of care Body  Regions:   neck  back  upper extremities  trunk    Body Systems:    gross symmetry  ROM  strength  gross coordinated movement  transfers  transitions  motor control  motor learning    Participation Restrictions:   Exercise, Remodeling house, family activities    Activity limitations:   Learning and applying knowledge  no deficits    General Tasks and Commands  no deficits    Communication  no deficits    Mobility  lifting and carrying objects  fine hand use (grasping/picking up)  driving (bike, car, motorcycle)    Self care  washing oneself (bathing, drying, washing hands)  caring for body parts (brushing teeth, shaving, grooming)  dressing  looking after one's health    Domestic Life  shopping  cooking  doing house work (cleaning house, washing dishes, laundry)  assisting others    Interactions/Relationships  No deficits    Life Areas  No deficits    Community and Social Life  No deficits         high   Clinical Presentation evolving clinical presentation with changing clinical characteristics moderate   Decision Making/ Complexity Score: moderate     Pt's spiritual, cultural and educational needs considered and pt agreeable to plan of care and goals as stated below:     Short Term GOALS: 4 weeks. Pt agrees with goals set.  1. Patient demonstrates independence with HEP.   2. Patient demonstrates independence with Postural Awareness.   3. Patient demonstrates independence with body mechanics.   4. Patient will report pain of 5/10 at worst, on 0-10 pain scale, with all activity  5. Pt will increase R GHJ AROM by 10 degrees in elevation plane to improve functional reach pain free.   6. Pt will increase UE strength by 1/3 muscle grade or greater on MMT to improve tolerance to all functional activities pain free.     Long Term Goals 8 Weeks. Pt agrees with goals set:  1. Pt will increase R GHJ ROM symmetrical to L GHJ to improve functional reach pain free.   2. Pt will increase UE strength to 4/5 to improve tolerance to  all functional activities pain free.   3. Pt demonstrates improved function per FOTO Shoulder Survey to 50% Limitation or less.   4. Patient will report pain of 2/10 at worst, on 0-10 pain scale, with all activity  5. Patient demonstrates ability to lift 10# object to overhead shelf with R UE, proper movement pattern, no pain provocation    Plan     Plan of care Certification: 2/10/2022 to 4/10/22.    Outpatient Physical Therapy 2 times weekly for 8 weeks to include the following interventions: Cervical/Lumbar Traction, Electrical Stimulation IFC/NMES, Manual Therapy, Moist Heat/ Ice, Neuromuscular Re-ed, Patient Education, Self Care, Therapeutic Activities, Therapeutic Exercise, Ultrasound and Dry Needling. Pt may be seen by PTA as part of the rehabilitation team.    Akhil Guajardo, PT  2/10/2022    I have seen the patient, reviewed the therapist's plan of care, and I agree with the plan of care.      I certify the need for these services furnished under this plan of treatment and while under my care.     ___________________ ________ Physician/Referring Practitioner            ___________________________ Date of Signature

## 2022-02-11 ENCOUNTER — CLINICAL SUPPORT (OUTPATIENT)
Dept: ENDOCRINOLOGY | Facility: CLINIC | Age: 72
End: 2022-02-11
Payer: MEDICARE

## 2022-02-11 PROCEDURE — 99499 UNLISTED E&M SERVICE: CPT | Mod: S$GLB,,, | Performed by: NURSE PRACTITIONER

## 2022-02-11 PROCEDURE — 99999 PR PBB SHADOW E&M-EST. PATIENT-LVL I: ICD-10-PCS | Mod: PBBFAC,,,

## 2022-02-11 PROCEDURE — 99499 NO LOS: ICD-10-PCS | Mod: S$GLB,,, | Performed by: NURSE PRACTITIONER

## 2022-02-11 PROCEDURE — 99999 PR PBB SHADOW E&M-EST. PATIENT-LVL I: CPT | Mod: PBBFAC,,,

## 2022-02-11 NOTE — PROGRESS NOTES
Johnny is here today to participate in a Continuous Glucose Monitoring Study.    Patient will wear a Dexcom for 7 days in a blinded study.     Patient will be provided with a Dexcom sensor and transmitter and a copy of the Glucose Monitoring Patient Log to fill out during the study.     A detailed explanation of Continuous Glucose Monitoring was provided.      Instructed patient to record meals, drinks,  snacks, activity, and all diabetes medications on glucose log.     Site for insertion was selected and prepared with a sterile alcohol swab and allowed to dry. Glucose Sensor Serial Number [id number] was inserted to right lower quadrant.     Insertion of sensor done in clinic, individually, in private. Time: 25 minutes

## 2022-02-16 NOTE — PRE ADMISSION SCREENING
RN Phone Pre Op.  Instructed to remain NPO after MN except for water.  For Covid screen.  Surgery arrival 08:00 am.

## 2022-02-17 ENCOUNTER — CLINICAL SUPPORT (OUTPATIENT)
Dept: REHABILITATION | Facility: HOSPITAL | Age: 72
End: 2022-02-17
Attending: ORTHOPAEDIC SURGERY
Payer: MEDICARE

## 2022-02-17 DIAGNOSIS — M95.8 WINGING OF SCAPULA: ICD-10-CM

## 2022-02-17 DIAGNOSIS — Z74.09 IMPAIRED MOBILITY AND ADLS: ICD-10-CM

## 2022-02-17 DIAGNOSIS — Z78.9 IMPAIRED MOBILITY AND ADLS: ICD-10-CM

## 2022-02-17 DIAGNOSIS — R29.898 WEAKNESS OF SHOULDER: ICD-10-CM

## 2022-02-17 PROCEDURE — 97110 THERAPEUTIC EXERCISES: CPT | Mod: PN

## 2022-02-17 PROCEDURE — 97140 MANUAL THERAPY 1/> REGIONS: CPT | Mod: PN

## 2022-02-17 NOTE — PROGRESS NOTES
"  Physical Therapy Daily Treatment Note     Name: Driss Hernandez Jr.  Clinic Number: [id number]    Therapy Diagnosis:   Encounter Diagnoses   Name Primary?    Weakness of shoulder     Winging of scapula     Impaired mobility and ADLs      Physician: Valerie Olivera MD    Visit Date: 2/17/2022    Physician Orders: PT Eval and Treat   Medical Diagnosis from Referral: Chronic right shoulder pain  Evaluation Date: 2/17/2022  Authorization Period Expiration: 1/10/23  Plan of Care Expiration: 4/10/22  Visit # / Visits authorized: 1/20 (+ eval)    Time In: 1030  Time Out: 1115  Total Billable Time: 45 minutes    Precautions: Fall      Subjective     Pt reports: feeling worsening pain at night recently, unable to sleep through the night.  He was compliant with home exercise program.  Response to previous treatment: decreased pain with activity, still has pain at night after activity  Functional change: none to report yet    Pain: 4/10  Location: right shoulder      Objective     + Bakody's Sign    Driss received therapeutic exercises to develop strength, endurance, ROM, flexibility, posture and core stabilization for 35 minutes including:    UBE 3'/3'  Pulleys Scap/Abd 2'/2'  R Upper Trap Str 3x30"  Standing Rows RTB 3x10  Shld IR/ER RTB 3x10 ea  Standing Horiz Abd RTB 20x  Eccentric Bicep Curl 2# 3x10  Supine Chin Tuck 2x10     Next  Supine Serratus Punch 5# 3x10  SL ER 2#  SL Shld Abd  Prone Row    Driss received the following manual therapy techniques: Joint mobilizations and Soft tissue Mobilization were applied to the: R Shoulder for 10 minutes, including:  Cervical Distraction + Suboccipital Release  STM to R UT    Driss received hot pack for 0 minutes to R Shoulder.  Driss received cold pack for 0 minutes to R Shoulder.    Home Exercises Provided and Patient Education Provided     Education provided:   Written Home Exercises Provided: yes.  Exercises were reviewed and Driss was able to demonstrate them prior to " the end of the session.  Driss demonstrated good  understanding of the education provided.     See EMR under Patient Instructions for exercises provided prior visit.    Assessment     Pt tolerated treatment session well, with decreased R shoulder pain as session progressed. Pt with + Bakody's sign today with decreased R shoulder pain with placement of hand on top of head, indicative of shoulder pain correlated with cervical pathology. Full resolution of shoulder symptoms today following manual cervical distraction and STM to R UT. R shoulder pain with heavy cervical component, and multi-factorial in nature with diagnostic imaging confirming R shoulder soft tissue pathology.    Driss Is progressing well towards his goals.   Pt prognosis is Fair.     Pt will continue to benefit from skilled outpatient physical therapy to address the deficits listed in the problem list box on initial evaluation, provide pt/family education and to maximize pt's level of independence in the home and community environment.     Pt's spiritual, cultural and educational needs considered and pt agreeable to plan of care and goals.     Anticipated barriers to physical therapy: COVID-19 Concerns    Goals:   Short Term GOALS: 4 weeks. Pt agrees with goals set.  1. Patient demonstrates independence with HEP.   2. Patient demonstrates independence with Postural Awareness.   3. Patient demonstrates independence with body mechanics.   4. Patient will report pain of 5/10 at worst, on 0-10 pain scale, with all activity  5. Pt will increase R GHJ AROM by 10 degrees in elevation plane to improve functional reach pain free.   6. Pt will increase UE strength by 1/3 muscle grade or greater on MMT to improve tolerance to all functional activities pain free.     Long Term Goals 8 Weeks. Pt agrees with goals set:  1. Pt will increase R GHJ ROM symmetrical to L GHJ to improve functional reach pain free.   2. Pt will increase UE strength to 4/5 to improve  tolerance to all functional activities pain free.   3. Pt demonstrates improved function per FOTO Shoulder Survey to 50% Limitation or less.   4. Patient will report pain of 2/10 at worst, on 0-10 pain scale, with all activity  5. Patient demonstrates ability to lift 10# object to overhead shelf with R UE, proper movement pattern, no pain provocation    Plan     Plan of care Certification: 2/17/2022 to 4/10/22.    Incorporate R shoulder + cervical treatment including ROM, strength, and manual techniques    Akhil Guajardo, PT

## 2022-02-18 ENCOUNTER — CLINICAL SUPPORT (OUTPATIENT)
Dept: ENDOCRINOLOGY | Facility: CLINIC | Age: 72
End: 2022-02-18
Payer: MEDICARE

## 2022-02-18 ENCOUNTER — OFFICE VISIT (OUTPATIENT)
Dept: ENDOCRINOLOGY | Facility: CLINIC | Age: 72
End: 2022-02-18
Payer: MEDICARE

## 2022-02-18 VITALS
BODY MASS INDEX: 31.37 KG/M2 | WEIGHT: 200.31 LBS | TEMPERATURE: 98 F | HEART RATE: 58 BPM | SYSTOLIC BLOOD PRESSURE: 124 MMHG | DIASTOLIC BLOOD PRESSURE: 62 MMHG

## 2022-02-18 DIAGNOSIS — E11.649 HYPOGLYCEMIA ASSOCIATED WITH DIABETES: ICD-10-CM

## 2022-02-18 DIAGNOSIS — N28.9 RENAL INSUFFICIENCY: ICD-10-CM

## 2022-02-18 DIAGNOSIS — I25.10 CORONARY ARTERY DISEASE INVOLVING NATIVE CORONARY ARTERY OF NATIVE HEART WITHOUT ANGINA PECTORIS: ICD-10-CM

## 2022-02-18 PROCEDURE — 1160F PR REVIEW ALL MEDS BY PRESCRIBER/CLIN PHARMACIST DOCUMENTED: ICD-10-PCS | Mod: CPTII,S$GLB,, | Performed by: NURSE PRACTITIONER

## 2022-02-18 PROCEDURE — 3051F PR MOST RECENT HEMOGLOBIN A1C LEVEL 7.0 - < 8.0%: ICD-10-PCS | Mod: CPTII,S$GLB,, | Performed by: NURSE PRACTITIONER

## 2022-02-18 PROCEDURE — 1159F MED LIST DOCD IN RCRD: CPT | Mod: CPTII,S$GLB,, | Performed by: NURSE PRACTITIONER

## 2022-02-18 PROCEDURE — 99499 RISK ADDL DX/OHS AUDIT: ICD-10-PCS | Mod: S$GLB,,, | Performed by: NURSE PRACTITIONER

## 2022-02-18 PROCEDURE — 3072F PR LOW RISK FOR RETINOPATHY: ICD-10-PCS | Mod: CPTII,S$GLB,, | Performed by: NURSE PRACTITIONER

## 2022-02-18 PROCEDURE — 3066F PR DOCUMENTATION OF TREATMENT FOR NEPHROPATHY: ICD-10-PCS | Mod: CPTII,S$GLB,, | Performed by: NURSE PRACTITIONER

## 2022-02-18 PROCEDURE — 3074F PR MOST RECENT SYSTOLIC BLOOD PRESSURE < 130 MM HG: ICD-10-PCS | Mod: CPTII,S$GLB,, | Performed by: NURSE PRACTITIONER

## 2022-02-18 PROCEDURE — 3078F DIAST BP <80 MM HG: CPT | Mod: CPTII,S$GLB,, | Performed by: NURSE PRACTITIONER

## 2022-02-18 PROCEDURE — 1159F PR MEDICATION LIST DOCUMENTED IN MEDICAL RECORD: ICD-10-PCS | Mod: CPTII,S$GLB,, | Performed by: NURSE PRACTITIONER

## 2022-02-18 PROCEDURE — 1160F RVW MEDS BY RX/DR IN RCRD: CPT | Mod: CPTII,S$GLB,, | Performed by: NURSE PRACTITIONER

## 2022-02-18 PROCEDURE — 99215 OFFICE O/P EST HI 40 MIN: CPT | Mod: S$GLB,,, | Performed by: NURSE PRACTITIONER

## 2022-02-18 PROCEDURE — 3060F PR POS MICROALBUMINURIA RESULT DOCUMENTED/REVIEW: ICD-10-PCS | Mod: CPTII,S$GLB,, | Performed by: NURSE PRACTITIONER

## 2022-02-18 PROCEDURE — 1126F PR PAIN SEVERITY QUANTIFIED, NO PAIN PRESENT: ICD-10-PCS | Mod: CPTII,S$GLB,, | Performed by: NURSE PRACTITIONER

## 2022-02-18 PROCEDURE — 99499 NO LOS: ICD-10-PCS | Mod: S$GLB,,, | Performed by: NURSE PRACTITIONER

## 2022-02-18 PROCEDURE — 99999 PR PBB SHADOW E&M-EST. PATIENT-LVL V: CPT | Mod: PBBFAC,,, | Performed by: NURSE PRACTITIONER

## 2022-02-18 PROCEDURE — 99499 UNLISTED E&M SERVICE: CPT | Mod: S$GLB,,, | Performed by: NURSE PRACTITIONER

## 2022-02-18 PROCEDURE — 3008F BODY MASS INDEX DOCD: CPT | Mod: CPTII,S$GLB,, | Performed by: NURSE PRACTITIONER

## 2022-02-18 PROCEDURE — 99999 PR PBB SHADOW E&M-EST. PATIENT-LVL I: ICD-10-PCS | Mod: PBBFAC,,,

## 2022-02-18 PROCEDURE — 95250 PR GLUCOSE MONITORING,72 HRS,SUB-Q SENSOR: ICD-10-PCS | Mod: S$GLB,,, | Performed by: NURSE PRACTITIONER

## 2022-02-18 PROCEDURE — 95250 CONT GLUC MNTR PHYS/QHP EQP: CPT | Mod: S$GLB,,, | Performed by: NURSE PRACTITIONER

## 2022-02-18 PROCEDURE — 1126F AMNT PAIN NOTED NONE PRSNT: CPT | Mod: CPTII,S$GLB,, | Performed by: NURSE PRACTITIONER

## 2022-02-18 PROCEDURE — 3078F PR MOST RECENT DIASTOLIC BLOOD PRESSURE < 80 MM HG: ICD-10-PCS | Mod: CPTII,S$GLB,, | Performed by: NURSE PRACTITIONER

## 2022-02-18 PROCEDURE — 99999 PR PBB SHADOW E&M-EST. PATIENT-LVL I: CPT | Mod: PBBFAC,,,

## 2022-02-18 PROCEDURE — 95251 PR GLUCOSE MONITOR, 72 HOUR, PHYS INTERP: ICD-10-PCS | Mod: S$GLB,,, | Performed by: NURSE PRACTITIONER

## 2022-02-18 PROCEDURE — 99999 PR PBB SHADOW E&M-EST. PATIENT-LVL V: ICD-10-PCS | Mod: PBBFAC,,, | Performed by: NURSE PRACTITIONER

## 2022-02-18 PROCEDURE — 3051F HG A1C>EQUAL 7.0%<8.0%: CPT | Mod: CPTII,S$GLB,, | Performed by: NURSE PRACTITIONER

## 2022-02-18 PROCEDURE — 3072F LOW RISK FOR RETINOPATHY: CPT | Mod: CPTII,S$GLB,, | Performed by: NURSE PRACTITIONER

## 2022-02-18 PROCEDURE — 3066F NEPHROPATHY DOC TX: CPT | Mod: CPTII,S$GLB,, | Performed by: NURSE PRACTITIONER

## 2022-02-18 PROCEDURE — 99215 PR OFFICE/OUTPT VISIT, EST, LEVL V, 40-54 MIN: ICD-10-PCS | Mod: S$GLB,,, | Performed by: NURSE PRACTITIONER

## 2022-02-18 PROCEDURE — 95251 CONT GLUC MNTR ANALYSIS I&R: CPT | Mod: S$GLB,,, | Performed by: NURSE PRACTITIONER

## 2022-02-18 PROCEDURE — 3074F SYST BP LT 130 MM HG: CPT | Mod: CPTII,S$GLB,, | Performed by: NURSE PRACTITIONER

## 2022-02-18 PROCEDURE — 3008F PR BODY MASS INDEX (BMI) DOCUMENTED: ICD-10-PCS | Mod: CPTII,S$GLB,, | Performed by: NURSE PRACTITIONER

## 2022-02-18 PROCEDURE — 3060F POS MICROALBUMINURIA REV: CPT | Mod: CPTII,S$GLB,, | Performed by: NURSE PRACTITIONER

## 2022-02-18 RX ORDER — GLIMEPIRIDE 2 MG/1
TABLET ORAL
Qty: 90 TABLET | Refills: 2 | Status: SHIPPED | OUTPATIENT
Start: 2022-02-18 | End: 2023-02-27 | Stop reason: SDUPTHER

## 2022-02-18 RX ORDER — METFORMIN HYDROCHLORIDE 500 MG/1
TABLET, EXTENDED RELEASE ORAL
Qty: 360 TABLET | Refills: 2 | Status: SHIPPED | OUTPATIENT
Start: 2022-02-18 | End: 2023-02-27 | Stop reason: SDUPTHER

## 2022-02-18 NOTE — PROGRESS NOTES
CC: This 71 y.o. White male  is here for evaluation of  T2DM along with comorbidities indicated in the Visit Diagnosis section of this encounter.    HPI: Driss Hernandez Jr. was diagnosed with T2DM in ~ 2005.   Pt was under care of Dr. Agrawal and TARI Werner NP, previously.   Medical history: CAD s/p CABG, atrial fibrillation       Prior visit 9/2021  a1c is about the same, from 6.8 to now 6.9%. pt believes his DM control is doing ok.   He has been hungry at bedtime and has snacked at night sometimes, and his FBGs rises high as 140s.   Plan Continue current medications. Make sure to eat a breakfast to avoid midday lows. Reviewed hypoglycemia treatment.   Undergo  Continuous Glucose Monitoring (CGM) study.   Test glucose 3x/day.   Return to clinic in 3 months with CGM study and A1c prior.       Interval history  Returns for routine f/u and review of CGM unblinded study.   a1c is up from 6.9 to 7.7%.   Admits diet over holidays was poor.    9 lb weight gain since Sept.       CGM INTERPRETATION: BGs are overall in range even after high carb meals/snacks (for example, BG remained in range after eating 5 inch poboy, chips, and strawberries). However, BG tends to fall often to the 70s throughout the day and night, and presumably his BGs would've dropped even lower during the study had he not received low alert alarms.         LAST DIABETES EDUCATION: never --     HOSPITALIZED FOR DIABETES  -  No.    DIABETES MEDICATION HX:   ozempic started 10/2020  Novolin R switched to glimepiride in Jan 2021     PRESCRIBED DIABETES MEDICATIONS:   Ozempic 1 mg once weekly  (obtains from [facility])  Tresiba  30 units hs (obtains from [facility])  metformin ER 1000 mg bid  glimepiride 2 mg once daily around 3 pm before snack     Misses medication doses - No      DM COMPLICATIONS: peripheral neuropathy and cardiovascular disease    SELF MONITORING BLOOD GLUCOSE: Checks blood glucose at home 2-3x/day. Forgot logs. He  declines personal CGM d/t cost. He does not want to pay anything for his testing supplies.     HYPOGLYCEMIC EPISODES: rare usually, usually because of high physical activity and skipping meals.      CURRENT DIET: drinks water, 2% milk ~ 2 cup/day. Eats 2-3 meals/day, skips breakfast    drinks 2 cups coffee with milk with AS in AM   Likes to to get a poboy once a week.     Food diary shows frequent consumption of small portions of sweets and fruit.  However, pt states such items are unusual in his typical diet and he expects to resume his low carb diet again.     CURRENT EXERCISE: none recent. Activity limited by MAYEN    SOCIAL: his daughter is a palliative care NP      /62   Pulse (!) 58   Temp 97.8 °F (36.6 °C)   Wt 90.9 kg (200 lb 4.8 oz)   BMI 31.37 kg/m²       ROS:   CONSTITUTIONAL: Appetite good, denies fatigue  MS: + RIGHT SHOULDER PAIN       PHYSICAL EXAM:  GENERAL: Well developed, well nourished. No acute distress.   PSYCH: AAOx3, appropriate mood and affect, conversant, well-groomed. Judgement and insight good.   NEURO: Cranial nerves grossly intact. Speech clear, no tremor.   CHEST: Respirations even and unlabored.     Foot exam:     Protective Sensation (w/ 10 gram monofilament):  Right: Intact  Left: Intact    Visual Inspection:  Skin Breakdown -  Neither    Pedal Pulses:   Right: Present  Left: Present    Posterior tibialis:   Right:Present  Left: Present        Hemoglobin A1C   Date Value Ref Range Status   02/10/2022 7.7 (H) 4.0 - 5.6 % Final     Comment:     ADA Screening Guidelines:  5.7-6.4%  Consistent with prediabetes  >or=6.5%  Consistent with diabetes    High levels of fetal hemoglobin interfere with the HbA1C  assay. Heterozygous hemoglobin variants (HbS, HgC, etc)do  not significantly interfere with this assay.   However, presence of multiple variants may affect accuracy.     09/15/2021 6.9 (H) 4.0 - 5.6 % Final     Comment:     ADA Screening Guidelines:  5.7-6.4%  Consistent with  prediabetes  >or=6.5%  Consistent with diabetes    High levels of fetal hemoglobin interfere with the HbA1C  assay. Heterozygous hemoglobin variants (HbS, HgC, etc)do  not significantly interfere with this assay.   However, presence of multiple variants may affect accuracy.     05/10/2021 6.8 (H) 4.0 - 5.6 % Final     Comment:     ADA Screening Guidelines:  5.7-6.4%  Consistent with prediabetes  >or=6.5%  Consistent with diabetes    High levels of fetal hemoglobin interfere with the HbA1C  assay. Heterozygous hemoglobin variants (HbS, HgC, etc)do  not significantly interfere with this assay.   However, presence of multiple variants may affect accuracy.     08/29/2019 7.7 % Final     Comment:     Ania Werner           Chemistry        Component Value Date/Time     12/28/2020 0735    K 5.2 (H) 01/06/2021 0855     12/28/2020 0735    CO2 25 12/28/2020 0735    BUN 29 (H) 12/28/2020 0735    CREATININE 1.4 02/10/2022 1014     (H) 12/28/2020 0735        Component Value Date/Time    CALCIUM 9.6 12/28/2020 0735    ALKPHOS 41 (L) 08/18/2020 0928    AST 18 08/18/2020 0928    ALT 18 08/18/2020 0928    BILITOT 1.1 (H) 08/18/2020 0928    ESTGFRAFRICA 57.6 (A) 02/10/2022 1014    EGFRNONAA 49.8 (A) 02/10/2022 1014          Lab Results   Component Value Date    LDLCALC 111.4 02/10/2022          Ref. Range 2/10/2022 10:14   Cholesterol Latest Ref Range: 120 - 199 mg/dL 163   HDL Latest Ref Range: 40 - 75 mg/dL 27 (L)   HDL/Cholesterol Ratio Latest Ref Range: 20.0 - 50.0 % 16.6 (L)   LDL Cholesterol External Latest Ref Range: 63.0 - 159.0 mg/dL 111.4   Non-HDL Cholesterol Latest Units: mg/dL 136   Total Cholesterol/HDL Ratio Latest Ref Range: 2.0 - 5.0  6.0 (H)   Triglycerides Latest Ref Range: 30 - 150 mg/dL 123     Lab Results   Component Value Date    MICALBCREAT 141.2 (H) 02/10/2022             ASSESSMENT and PLAN:    A1C GOAL: < 7.5 %       1. Diabetes mellitus type 2, uncontrolled, with complications   Reviewed CGM data in detail with patient.   Reduce Tresiba to 24 units once daily.   Continue Ozempic, metformin and glimepiride.   Reduce amount of sweets.   Test glucose 2-3x/day.   Return to clinic in 3 months with labs prior.         Hemoglobin A1C   2. Coronary artery disease involving native coronary artery of native heart without angina pectoris  Lipid Panel   3. Hypoglycemia associated with diabetes  Reduce Tresiba as above. Call if glucoses continue to be less than 80.    4. Renal insufficiency  Creatinine, Serum     Patient requires a therapeutic CGM and is willing to use therapeutic CGM/SMBG for Necessary frequent adjustments in insulin therapy. I have assessed adherence to CGM regimen and to the plan. Due to COVID pandemic, patient requires Dexcom CGM to manage diabetes.         Orders Placed This Encounter   Procedures    Hemoglobin A1C     Standing Status:   Future     Standing Expiration Date:   4/19/2023    Creatinine, Serum     Standing Status:   Future     Standing Expiration Date:   4/19/2023    Lipid Panel     Standing Status:   Future     Standing Expiration Date:   2/18/2023        Follow up in about 3 months (around 5/18/2022).

## 2022-02-18 NOTE — PATIENT INSTRUCTIONS
Reviewed CGM data in detail with patient.   Reduce Tresiba to 24 units once daily.   Continue Ozempic, metformin and glimepiride.   Reduce amount of sweets.   Test glucose 2-3x/day.   Return to clinic in 3 months with labs prior.

## 2022-02-18 NOTE — PROGRESS NOTES
Patient in clinic today to return Glucose Sensor.  Patient tolerated removal well, site intact, no bleeding or drainage.  The CGMS Sensor will be scanned and downloaded. All reports will be imported into the patient's electronic medical record.  Endocrine provider will complete data interpretation and make recommendations.

## 2022-02-20 NOTE — H&P
[facility] - Surgery  History & Physical    Subjective:      Chief Complaint/Reason for Admission:     Driss Hernandez Jr. is a 71 y.o. male.    Past Medical History:   Diagnosis Date    Chronic midline low back pain without sciatica 10/2/2017    Colon polyps     Coronary artery disease involving native coronary artery of native heart 3/5/2020    Coronary artery disease involving native coronary artery of native heart with angina pectoris 12/10/2020    Diabetes mellitus with neurological manifestations, uncontrolled 1/24/2017    Diabetic polyneuropathy associated with type 2 diabetes mellitus 1/24/2017    Diabetic polyneuropathy associated with type 2 diabetes mellitus     Essential hypertension 1/24/2017    Gastroesophageal reflux disease 1/24/2017    Hyperlipidemia 1/24/2017    Insomnia 1/24/2017    Long-term insulin use 1/24/2017    Longstanding persistent atrial fibrillation 12/30/2020    Lumbar spondylosis 11/13/2017    Nuclear sclerosis of both eyes 8/24/2017    Obesity     Stage 3 chronic kidney disease 2/13/2019    Uncontrolled type 2 diabetes mellitus without complication, with long-term current use of insulin 1/24/2017     Past Surgical History:   Procedure Laterality Date    BACK SURGERY      2002    CATARACT EXTRACTION W/  INTRAOCULAR LENS IMPLANT Right 08/29/2017    Dr. Hong    COLONOSCOPY N/A 2/21/2017    Procedure: COLONOSCOPY;  Surgeon: Robb Rosado MD;  Location: [facility] ENDO;  Service: Endoscopy;  Laterality: N/A;    CORONARY ANGIOGRAPHY INCLUDING BYPASS GRAFTS WITH CATHETERIZATION OF LEFT HEART N/A 11/11/2020    Procedure: ANGIOGRAM, CORONARY, INCLUDING BYPASS GRAFT, WITH LEFT HEART CATHETERIZATION;  Surgeon: Lane Cuellar MD;  Location: [facility] CATH LAB;  Service: Cardiology;  Laterality: N/A;  RN PRE OP 11-5-2020. --COVID NEGATIVE ON  11-. C A    CORONARY ARTERY BYPASS GRAFT      March 2016    EPIDURAL STEROID INJECTION Bilateral 11/14/2018    Procedure:  Lumbar Medial Branch Blocks;  Surgeon: Eze Byrd Jr., MD;  Location: [facility] ENDO;  Service: Pain Management;  Laterality: Bilateral;  Bilateral Lumbar Medial Branch Blocks L2, L3, L4, L5    [id number]  [id number]    Arrive @ 1150; NO Sedation    EPIDURAL STEROID INJECTION Bilateral 11/28/2018    Procedure: Injection, Steroid, Epidural;  Surgeon: Eze Byrd Jr., MD;  Location: [facility] ENDO;  Service: Pain Management;  Laterality: Bilateral;  Bilateral Sacroiliac Joint Steroid Injections    00071    Arrive @ 0910    EPIDURAL STEROID INJECTION Bilateral 3/20/2019    Procedure: Injection, Steroid, Sacroiliiac Joint;  Surgeon: Eze Byrd Jr., MD;  Location: [facility] ENDO;  Service: Pain Management;  Laterality: Bilateral;  Bilateral Sacroiliac Joint Steroid Injections    01784    Arrive @ 1145; Trigger point injections also?    TONSILLECTOMY       Family History   Problem Relation Age of Onset    Depression Mother     Heart disease Mother     Stroke Father     No Known Problems Sister     Blindness Brother     Diabetes Sister     No Known Problems Sister     No Known Problems Maternal Aunt     No Known Problems Maternal Uncle     No Known Problems Paternal Aunt     No Known Problems Paternal Uncle     No Known Problems Maternal Grandmother     No Known Problems Maternal Grandfather     No Known Problems Paternal Grandmother     No Known Problems Paternal Grandfather     Amblyopia Neg Hx     Cancer Neg Hx     Cataracts Neg Hx     Glaucoma Neg Hx     Hypertension Neg Hx     Macular degeneration Neg Hx     Retinal detachment Neg Hx     Strabismus Neg Hx     Thyroid disease Neg Hx      Social History     Tobacco Use    Smoking status: Never Smoker    Smokeless tobacco: Never Used   Substance Use Topics    Alcohol use: Yes     Comment: SOCIALLY    Drug use: No       No medications prior to admission.     Review of patient's allergies indicates:   Allergen Reactions    Penicillins Other  (See Comments)     Unknown reaction, Had a reaction as a child    Shrimp Itching     Hand itching        Review of Systems   Eyes: Positive for blurred vision.   All other systems reviewed and are negative.      Objective:      Vital Signs (Most Recent)       Vital Signs Range (Last 24H):       Physical Exam  Constitutional:       Appearance: He is well-developed.   HENT:      Head: Normocephalic.   Eyes:      Conjunctiva/sclera: Conjunctivae normal.      Pupils: Pupils are equal, round, and reactive to light.   Cardiovascular:      Rate and Rhythm: Normal rate.   Pulmonary:      Effort: Pulmonary effort is normal.      Breath sounds: Normal breath sounds.   Abdominal:      General: Bowel sounds are normal.      Palpations: Abdomen is soft.   Musculoskeletal:         General: Normal range of motion.      Cervical back: Normal range of motion and neck supple.   Skin:     General: Skin is warm.   Neurological:      Mental Status: He is alert and oriented to person, place, and time.         Data Review:   ECG:     Assessment:      Cataract OS.    Plan:    CE OS.

## 2022-02-21 ENCOUNTER — TELEPHONE (OUTPATIENT)
Dept: ENDOCRINOLOGY | Facility: CLINIC | Age: 72
End: 2022-02-21
Payer: MEDICARE

## 2022-02-21 NOTE — TELEPHONE ENCOUNTER
Pt wife states they are coming earlier to  medication before we go on lunch. Understanding verbalized.

## 2022-02-21 NOTE — TELEPHONE ENCOUNTER
Pt Ozempic delivered to office. Pt states wife will  medication around 1:00. Understanding was verbalized by pt.

## 2022-02-21 NOTE — TELEPHONE ENCOUNTER
----- Message from Karena Carter MA sent at 2/21/2022 11:19 AM CST -----  Type:  Patient Returning Call    Who Called: wife - Misa Hernandez    Who Left Message for Patient: FINN GUADALUPE    Does the patient know what this is regarding?:no    Would the patient rather a call back or a response via [facility]? yes    Best Call Back Number:[phone]

## 2022-02-23 ENCOUNTER — HOSPITAL ENCOUNTER (OUTPATIENT)
Dept: PREADMISSION TESTING | Facility: HOSPITAL | Age: 72
Discharge: HOME | End: 2022-02-23
Attending: OPHTHALMOLOGY
Payer: MEDICARE

## 2022-02-23 ENCOUNTER — ANESTHESIA EVENT (OUTPATIENT)
Dept: SURGERY | Facility: HOSPITAL | Age: 72
End: 2022-02-23
Payer: MEDICARE

## 2022-02-23 DIAGNOSIS — Z01.812 ENCOUNTER FOR PREOPERATIVE SCREENING LABORATORY TESTING FOR COVID-19 VIRUS: ICD-10-CM

## 2022-02-23 DIAGNOSIS — Z11.52 ENCOUNTER FOR PREOPERATIVE SCREENING LABORATORY TESTING FOR COVID-19 VIRUS: ICD-10-CM

## 2022-02-23 LAB — SARS-COV-2 RDRP RESP QL NAA+PROBE: NEGATIVE

## 2022-02-23 PROCEDURE — U0002 COVID-19 LAB TEST NON-CDC: HCPCS | Performed by: OPHTHALMOLOGY

## 2022-02-24 ENCOUNTER — ANESTHESIA (OUTPATIENT)
Dept: SURGERY | Facility: HOSPITAL | Age: 72
End: 2022-02-24
Payer: MEDICARE

## 2022-02-24 ENCOUNTER — HOSPITAL ENCOUNTER (OUTPATIENT)
Facility: HOSPITAL | Age: 72
Discharge: HOME | End: 2022-02-24
Attending: OPHTHALMOLOGY | Admitting: OPHTHALMOLOGY
Payer: MEDICARE

## 2022-02-24 VITALS
RESPIRATION RATE: 17 BRPM | SYSTOLIC BLOOD PRESSURE: 134 MMHG | HEART RATE: 50 BPM | DIASTOLIC BLOOD PRESSURE: 61 MMHG | TEMPERATURE: 98 F | BODY MASS INDEX: 29.82 KG/M2 | WEIGHT: 190 LBS | OXYGEN SATURATION: 98 % | HEIGHT: 67 IN

## 2022-02-24 DIAGNOSIS — Z11.52 ENCOUNTER FOR PREOPERATIVE SCREENING LABORATORY TESTING FOR COVID-19 VIRUS: ICD-10-CM

## 2022-02-24 DIAGNOSIS — Z01.812 ENCOUNTER FOR PREOPERATIVE SCREENING LABORATORY TESTING FOR COVID-19 VIRUS: ICD-10-CM

## 2022-02-24 DIAGNOSIS — H25.12 NUCLEAR SCLEROTIC CATARACT OF LEFT EYE: Primary | ICD-10-CM

## 2022-02-24 PROCEDURE — 37000009 HC ANESTHESIA EA ADD 15 MINS: Performed by: OPHTHALMOLOGY

## 2022-02-24 PROCEDURE — V2632 POST CHMBR INTRAOCULAR LENS: HCPCS | Performed by: OPHTHALMOLOGY

## 2022-02-24 PROCEDURE — 82962 GLUCOSE BLOOD TEST: CPT | Performed by: OPHTHALMOLOGY

## 2022-02-24 PROCEDURE — 71000016 HC POSTOP RECOV ADDL HR: Performed by: OPHTHALMOLOGY

## 2022-02-24 PROCEDURE — 36000707: Performed by: OPHTHALMOLOGY

## 2022-02-24 PROCEDURE — 63600175 PHARM REV CODE 636 W HCPCS: Performed by: ANESTHESIOLOGY

## 2022-02-24 PROCEDURE — 66984 PR REMOVAL, CATARACT, W/INSRT INTRAOC LENS, W/O ENDO CYCLO: ICD-10-PCS | Mod: LT,,, | Performed by: OPHTHALMOLOGY

## 2022-02-24 PROCEDURE — 71000015 HC POSTOP RECOV 1ST HR: Performed by: OPHTHALMOLOGY

## 2022-02-24 PROCEDURE — D9220A PRA ANESTHESIA: ICD-10-PCS | Mod: ANES,,, | Performed by: ANESTHESIOLOGY

## 2022-02-24 PROCEDURE — 36000706: Performed by: OPHTHALMOLOGY

## 2022-02-24 PROCEDURE — D9220A PRA ANESTHESIA: Mod: CRNA,,, | Performed by: STUDENT IN AN ORGANIZED HEALTH CARE EDUCATION/TRAINING PROGRAM

## 2022-02-24 PROCEDURE — D9220A PRA ANESTHESIA: ICD-10-PCS | Mod: CRNA,,, | Performed by: STUDENT IN AN ORGANIZED HEALTH CARE EDUCATION/TRAINING PROGRAM

## 2022-02-24 PROCEDURE — 63600175 PHARM REV CODE 636 W HCPCS: Performed by: STUDENT IN AN ORGANIZED HEALTH CARE EDUCATION/TRAINING PROGRAM

## 2022-02-24 PROCEDURE — 37000008 HC ANESTHESIA 1ST 15 MINUTES: Performed by: OPHTHALMOLOGY

## 2022-02-24 PROCEDURE — 25000003 PHARM REV CODE 250: Performed by: OPHTHALMOLOGY

## 2022-02-24 PROCEDURE — D9220A PRA ANESTHESIA: Mod: ANES,,, | Performed by: ANESTHESIOLOGY

## 2022-02-24 PROCEDURE — 66984 XCAPSL CTRC RMVL W/O ECP: CPT | Mod: LT,,, | Performed by: OPHTHALMOLOGY

## 2022-02-24 PROCEDURE — 63600175 PHARM REV CODE 636 W HCPCS: Performed by: OPHTHALMOLOGY

## 2022-02-24 DEVICE — LENS 20.0: Type: IMPLANTABLE DEVICE | Site: EYE | Status: FUNCTIONAL

## 2022-02-24 RX ORDER — LIDOCAINE HYDROCHLORIDE 20 MG/ML
INJECTION, SOLUTION INFILTRATION; PERINEURAL
Status: DISCONTINUED | OUTPATIENT
Start: 2022-02-24 | End: 2022-02-24 | Stop reason: HOSPADM

## 2022-02-24 RX ORDER — PREDNISOLONE ACETATE 10 MG/ML
SUSPENSION/ DROPS OPHTHALMIC
Status: DISCONTINUED | OUTPATIENT
Start: 2022-02-24 | End: 2022-02-24 | Stop reason: HOSPADM

## 2022-02-24 RX ORDER — FENTANYL CITRATE 50 UG/ML
INJECTION, SOLUTION INTRAMUSCULAR; INTRAVENOUS
Status: DISCONTINUED | OUTPATIENT
Start: 2022-02-24 | End: 2022-02-24

## 2022-02-24 RX ORDER — SODIUM CHLORIDE 9 MG/ML
INJECTION, SOLUTION INTRAVENOUS CONTINUOUS
Status: DISCONTINUED | OUTPATIENT
Start: 2022-02-24 | End: 2022-02-24 | Stop reason: HOSPADM

## 2022-02-24 RX ORDER — SODIUM CHLORIDE 0.9 % (FLUSH) 0.9 %
10 SYRINGE (ML) INJECTION
Status: ACTIVE | OUTPATIENT
Start: 2022-02-24

## 2022-02-24 RX ORDER — ACETAMINOPHEN 325 MG/1
650 TABLET ORAL EVERY 4 HOURS PRN
Status: DISCONTINUED | OUTPATIENT
Start: 2022-02-24 | End: 2022-02-24 | Stop reason: HOSPADM

## 2022-02-24 RX ORDER — TROPICAMIDE 10 MG/ML
1 SOLUTION/ DROPS OPHTHALMIC
Status: COMPLETED | OUTPATIENT
Start: 2022-02-24 | End: 2022-02-24

## 2022-02-24 RX ORDER — OFLOXACIN 3 MG/ML
1 SOLUTION/ DROPS OPHTHALMIC
Status: DISPENSED | OUTPATIENT
Start: 2022-02-24

## 2022-02-24 RX ORDER — TETRACAINE HYDROCHLORIDE 5 MG/ML
1 SOLUTION OPHTHALMIC
Status: COMPLETED | OUTPATIENT
Start: 2022-02-24 | End: 2022-02-24

## 2022-02-24 RX ORDER — PHENYLEPHRINE HYDROCHLORIDE 25 MG/ML
1 SOLUTION/ DROPS OPHTHALMIC
Status: COMPLETED | OUTPATIENT
Start: 2022-02-24 | End: 2022-02-24

## 2022-02-24 RX ORDER — POVIDONE-IODINE 5 %
SOLUTION, NON-ORAL OPHTHALMIC (EYE)
Status: DISCONTINUED | OUTPATIENT
Start: 2022-02-24 | End: 2022-02-24 | Stop reason: HOSPADM

## 2022-02-24 RX ORDER — CYCLOPENTOLATE HYDROCHLORIDE 10 MG/ML
1 SOLUTION/ DROPS OPHTHALMIC
Status: DISPENSED | OUTPATIENT
Start: 2022-02-24

## 2022-02-24 RX ORDER — LIDOCAINE HYDROCHLORIDE 10 MG/ML
1 INJECTION, SOLUTION EPIDURAL; INFILTRATION; INTRACAUDAL; PERINEURAL ONCE
Status: DISCONTINUED | OUTPATIENT
Start: 2022-02-24 | End: 2022-02-24 | Stop reason: HOSPADM

## 2022-02-24 RX ORDER — SODIUM CHLORIDE, SODIUM LACTATE, POTASSIUM CHLORIDE, CALCIUM CHLORIDE 600; 310; 30; 20 MG/100ML; MG/100ML; MG/100ML; MG/100ML
INJECTION, SOLUTION INTRAVENOUS CONTINUOUS
Status: DISCONTINUED | OUTPATIENT
Start: 2022-02-24 | End: 2022-02-24 | Stop reason: HOSPADM

## 2022-02-24 RX ORDER — HYDROCODONE BITARTRATE AND ACETAMINOPHEN 5; 325 MG/1; MG/1
1 TABLET ORAL EVERY 4 HOURS PRN
Status: DISCONTINUED | OUTPATIENT
Start: 2022-02-24 | End: 2022-02-24 | Stop reason: HOSPADM

## 2022-02-24 RX ADMIN — TETRACAINE HYDROCHLORIDE 1 DROP: 5 SOLUTION OPHTHALMIC at 08:02

## 2022-02-24 RX ADMIN — CYCLOPENTOLATE HYDROCHLORIDE 1 DROP: 10 SOLUTION/ DROPS OPHTHALMIC at 08:02

## 2022-02-24 RX ADMIN — OFLOXACIN 1 DROP: 3 SOLUTION OPHTHALMIC at 09:02

## 2022-02-24 RX ADMIN — TROPICAMIDE 1 DROP: 10 SOLUTION/ DROPS OPHTHALMIC at 08:02

## 2022-02-24 RX ADMIN — FENTANYL CITRATE 25 MCG: 50 INJECTION, SOLUTION INTRAMUSCULAR; INTRAVENOUS at 10:02

## 2022-02-24 RX ADMIN — TROPICAMIDE 1 DROP: 10 SOLUTION/ DROPS OPHTHALMIC at 09:02

## 2022-02-24 RX ADMIN — SODIUM CHLORIDE, SODIUM LACTATE, POTASSIUM CHLORIDE, AND CALCIUM CHLORIDE: .6; .31; .03; .02 INJECTION, SOLUTION INTRAVENOUS at 10:02

## 2022-02-24 RX ADMIN — PHENYLEPHRINE HYDROCHLORIDE 1 DROP: 25 SOLUTION/ DROPS OPHTHALMIC at 09:02

## 2022-02-24 RX ADMIN — PHENYLEPHRINE HYDROCHLORIDE 1 DROP: 25 SOLUTION/ DROPS OPHTHALMIC at 08:02

## 2022-02-24 RX ADMIN — OFLOXACIN 1 DROP: 3 SOLUTION OPHTHALMIC at 08:02

## 2022-02-24 RX ADMIN — TETRACAINE HYDROCHLORIDE 1 DROP: 5 SOLUTION OPHTHALMIC at 09:02

## 2022-02-24 RX ADMIN — CYCLOPENTOLATE HYDROCHLORIDE 1 DROP: 10 SOLUTION/ DROPS OPHTHALMIC at 09:02

## 2022-02-24 RX ADMIN — FENTANYL CITRATE 50 MCG: 50 INJECTION, SOLUTION INTRAMUSCULAR; INTRAVENOUS at 10:02

## 2022-02-24 NOTE — DISCHARGE INSTRUCTIONS
ACTIVITY LEVEL:  If you received sedation or an anesthetic, you may feel sleepy for several hours. Rest until you are more awake. Gradually resume your normal activities. Normal activity will cause no undue risk to your eye. The white part of your eye might be red - this is nothing to worry about. Wear your old glasses or sunglasses that were given to you for protection during the day.    RESTRICTIONS - for the next 7 days  · Do not lift anything over 10 pounds.  · When bending, bend at the knees not the waist.  · Do not rub the eye.  · Do not get water in the eye.  · Do not sleep on the side that had surgery.  · Protect your eye at bedtime with the shield provided.    DIET: At home, continue with liquids. If there is no nausea, you may eat a soft diet and gradually resume your normal diet. Limit alcohol intake for 24 hours.    BATHING: You may shower or bathe as normal. You may take a warm wash cloth, which has been wrung out to remove excess water, and gently remove crusting on the lashes.    MEDICATIONS: You may take Extra Strength Tylenol every 4 hours for pain/headache.     Use your drops     Today: Pink- 2pm, 5pm, 8 pm     Beige - 2pm, 5pm, 8pm     Grey- 2 pm    Tomorrow:  Resume pink and beige cap drops four times a day.  Resume grey cap drops once a day.    Place one drop in the eye that had surgery from the first bottle. Wait 5 minutes and then use the second bottle. (It does not matter which bottle is used first.) CONTINUE all glaucoma drops.  No driving, alcoholic beverages or signing legal documents for next 24 hours or while taking pain medication      WHEN TO CALL THE DOCTOR:  · Redness that increases significantly  · Pain not relieved by Tylenol  RETURN APPOINTMENT:  You will need to see Dr. Hong on Friday at the [facility] clinic .  Bring your eye kit with you on your follow-up visit. Your kit contains sunglasses, eye shield, tape.  FOR EMERGENCIES:  If any unusual problems or difficulties occur,  contact Dr. Hong at the Clinic office at [phone] during normal business hours. If after hours, call [phone].

## 2022-02-24 NOTE — TRANSFER OF CARE
"Anesthesia Transfer of Care Note    Patient: Driss Hernandez Jr.    Procedure(s) Performed: Procedure(s) (LRB):  PHACOEMULSIFICATION, CATARACT (Left)  INSERTION, IOL PROSTHESIS (Left)    Patient location: [facility]    Anesthesia Type: MAC    Transport from OR: Transported from OR on room air with adequate spontaneous ventilation    Post pain: adequate analgesia    Post assessment: no apparent anesthetic complications and tolerated procedure well    Post vital signs: stable    Level of consciousness: awake and alert    Nausea/Vomiting: no nausea/vomiting    Complications: none    Transfer of care protocol was followed      Last vitals:   Visit Vitals  BP (!) 184/81   Pulse (!) 59   Temp 36.6 °C (97.8 °F) (Oral)   Resp 18   Ht 5' 7" (1.702 m)   Wt 86.2 kg (190 lb)   SpO2 97%   BMI 29.76 kg/m²     "

## 2022-02-24 NOTE — OP NOTE
Operative Date:  02/24/2022    Discharge Date:  02/24/2022    Discharge Patient Home    Report Title: Operative Note      SURGEON: Nickolas Hong MD     ASSISTANT:     PREOPERATIVE DIAGNOSIS: Visually significant NSC cataract,  Left Eye.    POSTOPERATIVE DIAGNOSIS: Visually-significant NSC cataract,  Left Eye.    PROCEDURE PERFORMED: Phacoemulsification of the cataract with posterior chamber intraocular lens Left Eye.    ANESTHESIA: Topical with MAC     COMPLICATIONS: None    ESTIMATED BLOOD LOSS: Minimal    INDICATIONS FOR PROCEDURE:   The patient is a pleasant 71 year old gentleman with increasing difficulties with activities of daily living secondary to a dense visually significant cataract in the Left Eye.  Discussions have been carried out with this patient concerning the options to surgery, risks, benefits and expectations.  The patient voiced good understanding and wished to proceed with the above procedure.    PROCEDURE IN DETAIL: The patient was brought to the operating room and received topical anesthetic to the eye and then was prepped and draped in the usual sterile fashion.  Using the Brandon ring and the guarded louie blade set at 0.37 mm, a partial thickness clear cornea incision was made temporally.  The paracentesis site was made at the six o'clock position.  Omidria was injected into the anterior chamber through the paracentesis.  Viscoat was then injected into the anterior chamber.  The eye was then reentered at the primary surgical site with a 2.4 mm keratome followed by continuous capsulotomy, hydrodissection, hydrodelineation and phacoemulsification of the cataract.  Residual cortical material was removed using automated irrigation-aspiration technique.  Healon was injected into the posterior chamber and an SN60WF 21.5 diopter lens was placed in the bag without difficulty. Residual viscoelastic was removed using automated irrigation-aspiration technique. The eye was re-pressurized  using BSS solution and both the paracentesis site and the primary surgical site were demonstrated to be watertight at the end of the case with Weck--Lissette manipulation.  One drop of Ofloxacin and one drop of Pred acetate 1% was applied to the Left Eye .The eye was closed, patched and a Krishnamurthy shield placed.  The patient was taken to the recovery room in good and stable condition.  The patient tolerated the procedure well.  The patient was instructed to refrain from any heavy lifting, bending, stooping or straining activities, discharged home  and to follow-up in the morning for routine postoperative care with Nickolas Hong MD.

## 2022-02-24 NOTE — PLAN OF CARE
Pt verbalized readiness to go home and understanding of discharge instructions. Eye shield on. Gray bag given to pt and spouse.

## 2022-02-24 NOTE — ANESTHESIA POSTPROCEDURE EVALUATION
Anesthesia Post Evaluation    Patient: Driss Hernandez     Procedure(s) Performed: Procedure(s) (LRB):  PHACOEMULSIFICATION, CATARACT (Left)  INSERTION, IOL PROSTHESIS (Left)    Final Anesthesia Type: MAC      Patient location during evaluation: [facility]  Patient participation: Yes- Able to Participate  Level of consciousness: awake and alert, oriented and awake  Post-procedure vital signs: reviewed and stable  Airway patency: patent    PONV status at discharge: No PONV  Anesthetic complications: no      Cardiovascular status: blood pressure returned to baseline  Respiratory status: unassisted, spontaneous ventilation and room air  Hydration status: euvolemic  Follow-up not needed.          Vitals Value Taken Time   /81 02/24/22 1044   Temp 36.6 °C (97.8 °F) 02/24/22 1044   Pulse 59 02/24/22 1044   Resp 18 02/24/22 1044   SpO2 97 % 02/24/22 1044         No case tracking events are documented in the log.      Pain/Jose Maria Score: No data recorded

## 2022-02-24 NOTE — BRIEF OP NOTE
[facility] - Surgery  Brief Operative Note    Surgery Date: 2/24/2022     Surgeon(s) and Role:     * Nickolas Hong MD - Primary    Assisting Surgeon: None    Pre-op Diagnosis:  NS (nuclear sclerosis), left [H25.12]    Post-op Diagnosis:  Post-Op Diagnosis Codes:     * NS (nuclear sclerosis), left [H25.12]    Procedure(s) (LRB):  PHACOEMULSIFICATION, CATARACT (Left)  INSERTION, IOL PROSTHESIS (Left)    Anesthesia: Local MAC    Operative Findings:     Estimated Blood Loss: * No values recorded between 2/24/2022 10:14 AM and 2/24/2022 10:38 AM *         Specimens:   Specimen (24h ago, onward)            None            Discharge Note    OUTCOME: Patient tolerated treatment/procedure well without complication and is now ready for discharge.    DISPOSITION: Home or Self Care    FINAL DIAGNOSIS:  Nuclear sclerotic cataract of left eye    FOLLOWUP: In clinic    DISCHARGE INSTRUCTIONS:    Discharge Procedure Orders   Other restrictions (specify):   Order Comments: No heavy lifting or bending for 1 week.

## 2022-02-24 NOTE — ANESTHESIA PREPROCEDURE EVALUATION
02/24/2022  Driss Hernandez Jr. is a 71 y.o., male.      Pre-op Assessment    I have reviewed the Patient Summary Reports.     I have reviewed the Nursing Notes.    I have reviewed the Medications.     Review of Systems  Anesthesia Hx:  No problems with previous Anesthesia Denies Hx of Anesthetic complications  Neg history of prior surgery. Denies Family Hx of Anesthesia complications.   Denies Personal Hx of Anesthesia complications.   Social:  Non-Smoker, No Alcohol Use    Hematology/Oncology:  Hematology Normal   Oncology Normal     EENT/Dental:EENT/Dental Normal   Cardiovascular:   Exercise tolerance: good Hypertension CAD   Angina hyperlipidemia    Pulmonary:  Pulmonary Normal    Renal/:   Chronic Renal Disease    Hepatic/GI:   GERD    Musculoskeletal:  Musculoskeletal Normal    Neurological:  Neurology Normal    Endocrine:   Diabetes, type 2    Dermatological:  Skin Normal    Psych:  Psychiatric Normal           Physical Exam  General: Well nourished    Airway:  Mallampati: II   Mouth Opening: Normal  Tongue: Normal  Neck ROM: Normal ROM    Dental:  Intact    Chest/Lungs:  Clear to auscultation    Heart:  Rate: Normal  Rhythm: Regular Rhythm  Sounds: Normal        Anesthesia Plan  Type of Anesthesia, risks & benefits discussed:    Anesthesia Type: MAC  Intra-op Monitoring Plan: Standard ASA Monitors  Induction:  IV  Informed Consent: Patient consented to blood products? Yes  ASA Score: 3    Ready For Surgery From Anesthesia Perspective.     .

## 2022-02-25 ENCOUNTER — OFFICE VISIT (OUTPATIENT)
Dept: OPHTHALMOLOGY | Facility: CLINIC | Age: 72
End: 2022-02-25
Payer: MEDICARE

## 2022-02-25 VITALS — HEART RATE: 52 BPM | DIASTOLIC BLOOD PRESSURE: 67 MMHG | SYSTOLIC BLOOD PRESSURE: 152 MMHG

## 2022-02-25 DIAGNOSIS — H40.053 OHT (OCULAR HYPERTENSION), BILATERAL: ICD-10-CM

## 2022-02-25 DIAGNOSIS — Z98.890 POST-OPERATIVE STATE: Primary | ICD-10-CM

## 2022-02-25 DIAGNOSIS — Z96.1 PSEUDOPHAKIA: ICD-10-CM

## 2022-02-25 LAB — POCT GLUCOSE: 141 MG/DL (ref 70–110)

## 2022-02-25 PROCEDURE — 3078F PR MOST RECENT DIASTOLIC BLOOD PRESSURE < 80 MM HG: ICD-10-PCS | Mod: CPTII,S$GLB,, | Performed by: OPHTHALMOLOGY

## 2022-02-25 PROCEDURE — 1101F PR PT FALLS ASSESS DOC 0-1 FALLS W/OUT INJ PAST YR: ICD-10-PCS | Mod: CPTII,S$GLB,, | Performed by: OPHTHALMOLOGY

## 2022-02-25 PROCEDURE — 1160F PR REVIEW ALL MEDS BY PRESCRIBER/CLIN PHARMACIST DOCUMENTED: ICD-10-PCS | Mod: CPTII,S$GLB,, | Performed by: OPHTHALMOLOGY

## 2022-02-25 PROCEDURE — 1101F PT FALLS ASSESS-DOCD LE1/YR: CPT | Mod: CPTII,S$GLB,, | Performed by: OPHTHALMOLOGY

## 2022-02-25 PROCEDURE — 3288F FALL RISK ASSESSMENT DOCD: CPT | Mod: CPTII,S$GLB,, | Performed by: OPHTHALMOLOGY

## 2022-02-25 PROCEDURE — 3078F DIAST BP <80 MM HG: CPT | Mod: CPTII,S$GLB,, | Performed by: OPHTHALMOLOGY

## 2022-02-25 PROCEDURE — 1126F AMNT PAIN NOTED NONE PRSNT: CPT | Mod: CPTII,S$GLB,, | Performed by: OPHTHALMOLOGY

## 2022-02-25 PROCEDURE — 3288F PR FALLS RISK ASSESSMENT DOCUMENTED: ICD-10-PCS | Mod: CPTII,S$GLB,, | Performed by: OPHTHALMOLOGY

## 2022-02-25 PROCEDURE — 1159F MED LIST DOCD IN RCRD: CPT | Mod: CPTII,S$GLB,, | Performed by: OPHTHALMOLOGY

## 2022-02-25 PROCEDURE — 3077F PR MOST RECENT SYSTOLIC BLOOD PRESSURE >= 140 MM HG: ICD-10-PCS | Mod: CPTII,S$GLB,, | Performed by: OPHTHALMOLOGY

## 2022-02-25 PROCEDURE — 3066F PR DOCUMENTATION OF TREATMENT FOR NEPHROPATHY: ICD-10-PCS | Mod: CPTII,S$GLB,, | Performed by: OPHTHALMOLOGY

## 2022-02-25 PROCEDURE — 3077F SYST BP >= 140 MM HG: CPT | Mod: CPTII,S$GLB,, | Performed by: OPHTHALMOLOGY

## 2022-02-25 PROCEDURE — 1160F RVW MEDS BY RX/DR IN RCRD: CPT | Mod: CPTII,S$GLB,, | Performed by: OPHTHALMOLOGY

## 2022-02-25 PROCEDURE — 99024 POSTOP FOLLOW-UP VISIT: CPT | Mod: S$GLB,,, | Performed by: OPHTHALMOLOGY

## 2022-02-25 PROCEDURE — 99024 PR POST-OP FOLLOW-UP VISIT: ICD-10-PCS | Mod: S$GLB,,, | Performed by: OPHTHALMOLOGY

## 2022-02-25 PROCEDURE — 99999 PR PBB SHADOW E&M-EST. PATIENT-LVL IV: ICD-10-PCS | Mod: PBBFAC,,, | Performed by: OPHTHALMOLOGY

## 2022-02-25 PROCEDURE — 3051F HG A1C>EQUAL 7.0%<8.0%: CPT | Mod: CPTII,S$GLB,, | Performed by: OPHTHALMOLOGY

## 2022-02-25 PROCEDURE — 99999 PR PBB SHADOW E&M-EST. PATIENT-LVL IV: CPT | Mod: PBBFAC,,, | Performed by: OPHTHALMOLOGY

## 2022-02-25 PROCEDURE — 99499 UNLISTED E&M SERVICE: CPT | Mod: S$GLB,,, | Performed by: OPHTHALMOLOGY

## 2022-02-25 PROCEDURE — 99499 RISK ADDL DX/OHS AUDIT: ICD-10-PCS | Mod: S$GLB,,, | Performed by: OPHTHALMOLOGY

## 2022-02-25 PROCEDURE — 3060F PR POS MICROALBUMINURIA RESULT DOCUMENTED/REVIEW: ICD-10-PCS | Mod: CPTII,S$GLB,, | Performed by: OPHTHALMOLOGY

## 2022-02-25 PROCEDURE — 3051F PR MOST RECENT HEMOGLOBIN A1C LEVEL 7.0 - < 8.0%: ICD-10-PCS | Mod: CPTII,S$GLB,, | Performed by: OPHTHALMOLOGY

## 2022-02-25 PROCEDURE — 1159F PR MEDICATION LIST DOCUMENTED IN MEDICAL RECORD: ICD-10-PCS | Mod: CPTII,S$GLB,, | Performed by: OPHTHALMOLOGY

## 2022-02-25 PROCEDURE — 1126F PR PAIN SEVERITY QUANTIFIED, NO PAIN PRESENT: ICD-10-PCS | Mod: CPTII,S$GLB,, | Performed by: OPHTHALMOLOGY

## 2022-02-25 PROCEDURE — 3066F NEPHROPATHY DOC TX: CPT | Mod: CPTII,S$GLB,, | Performed by: OPHTHALMOLOGY

## 2022-02-25 PROCEDURE — 3060F POS MICROALBUMINURIA REV: CPT | Mod: CPTII,S$GLB,, | Performed by: OPHTHALMOLOGY

## 2022-02-25 RX ORDER — KETOROLAC TROMETHAMINE 5 MG/ML
SOLUTION OPHTHALMIC 4 TIMES DAILY
COMMUNITY
Start: 2022-01-14 | End: 2022-03-21

## 2022-02-25 NOTE — PROGRESS NOTES
Subjective:       Patient ID: Driss Hernandez Jr. is a 71 y.o. male.    Chief Complaint: Post-op Evaluation    HPI     S/P Phaco w/IOL OS- 2/24/2022 Dr. Hong    71 y.o. male is here for 1 day PO OS. Denies eye pain and f/f. No   discomfort.     Eye Med's: Ofloxacin QID OS                     Pred QID OS                     Ketorolac QID OS     Last edited by ERIKA Fernández on 2/25/2022  9:41 AM. (History)             Assessment:       1. Post-operative state    2. OHT (ocular hypertension), bilateral    3. Pseudophakia        Plan:       S/p CE OS- Doing well.     OHT OU-IOP is elevated OS but IOP was lowered in office by burping incision.      CPM OS.  RTC 1 wk.

## 2022-03-02 ENCOUNTER — TELEPHONE (OUTPATIENT)
Dept: ENDOCRINOLOGY | Facility: CLINIC | Age: 72
End: 2022-03-02
Payer: MEDICARE

## 2022-03-02 NOTE — TELEPHONE ENCOUNTER
----- Message from Celena Salazar sent at 3/2/2022  2:17 PM CST -----  Contact: [facility]  [phone]  Type: Patient Call Back    Who called: [facility]    What is the request in detail: Calling in regards to patient's DexCom. Tried calling patient in regards to the DexCom, but patient is refusing to speak to them because he doesn't know who they are. Asha is calling to find out if someone from the office can call pt to let him know that it is okay to speak to them about the machine. Please call.     Would the patient rather a call back or a response via [facility]? Call back    Best call back number: [phone]

## 2022-03-02 NOTE — TELEPHONE ENCOUNTER
Left message notifying patient that [facility] is trying to contact him regarding Dexcom

## 2022-03-04 ENCOUNTER — OFFICE VISIT (OUTPATIENT)
Dept: OPHTHALMOLOGY | Facility: CLINIC | Age: 72
End: 2022-03-04
Payer: MEDICARE

## 2022-03-04 DIAGNOSIS — Z98.890 POST-OPERATIVE STATE: Primary | ICD-10-CM

## 2022-03-04 DIAGNOSIS — Z96.1 PSEUDOPHAKIA: ICD-10-CM

## 2022-03-04 PROCEDURE — 99024 POSTOP FOLLOW-UP VISIT: CPT | Mod: S$GLB,,, | Performed by: OPHTHALMOLOGY

## 2022-03-04 PROCEDURE — 3288F FALL RISK ASSESSMENT DOCD: CPT | Mod: CPTII,S$GLB,, | Performed by: OPHTHALMOLOGY

## 2022-03-04 PROCEDURE — 3288F PR FALLS RISK ASSESSMENT DOCUMENTED: ICD-10-PCS | Mod: CPTII,S$GLB,, | Performed by: OPHTHALMOLOGY

## 2022-03-04 PROCEDURE — 1101F PT FALLS ASSESS-DOCD LE1/YR: CPT | Mod: CPTII,S$GLB,, | Performed by: OPHTHALMOLOGY

## 2022-03-04 PROCEDURE — 1126F PR PAIN SEVERITY QUANTIFIED, NO PAIN PRESENT: ICD-10-PCS | Mod: CPTII,S$GLB,, | Performed by: OPHTHALMOLOGY

## 2022-03-04 PROCEDURE — 99024 PR POST-OP FOLLOW-UP VISIT: ICD-10-PCS | Mod: S$GLB,,, | Performed by: OPHTHALMOLOGY

## 2022-03-04 PROCEDURE — 1160F PR REVIEW ALL MEDS BY PRESCRIBER/CLIN PHARMACIST DOCUMENTED: ICD-10-PCS | Mod: CPTII,S$GLB,, | Performed by: OPHTHALMOLOGY

## 2022-03-04 PROCEDURE — 3066F NEPHROPATHY DOC TX: CPT | Mod: CPTII,S$GLB,, | Performed by: OPHTHALMOLOGY

## 2022-03-04 PROCEDURE — 3060F PR POS MICROALBUMINURIA RESULT DOCUMENTED/REVIEW: ICD-10-PCS | Mod: CPTII,S$GLB,, | Performed by: OPHTHALMOLOGY

## 2022-03-04 PROCEDURE — 99999 PR PBB SHADOW E&M-EST. PATIENT-LVL IV: ICD-10-PCS | Mod: PBBFAC,,, | Performed by: OPHTHALMOLOGY

## 2022-03-04 PROCEDURE — 3060F POS MICROALBUMINURIA REV: CPT | Mod: CPTII,S$GLB,, | Performed by: OPHTHALMOLOGY

## 2022-03-04 PROCEDURE — 1159F PR MEDICATION LIST DOCUMENTED IN MEDICAL RECORD: ICD-10-PCS | Mod: CPTII,S$GLB,, | Performed by: OPHTHALMOLOGY

## 2022-03-04 PROCEDURE — 3051F HG A1C>EQUAL 7.0%<8.0%: CPT | Mod: CPTII,S$GLB,, | Performed by: OPHTHALMOLOGY

## 2022-03-04 PROCEDURE — 1101F PR PT FALLS ASSESS DOC 0-1 FALLS W/OUT INJ PAST YR: ICD-10-PCS | Mod: CPTII,S$GLB,, | Performed by: OPHTHALMOLOGY

## 2022-03-04 PROCEDURE — 3051F PR MOST RECENT HEMOGLOBIN A1C LEVEL 7.0 - < 8.0%: ICD-10-PCS | Mod: CPTII,S$GLB,, | Performed by: OPHTHALMOLOGY

## 2022-03-04 PROCEDURE — 99999 PR PBB SHADOW E&M-EST. PATIENT-LVL IV: CPT | Mod: PBBFAC,,, | Performed by: OPHTHALMOLOGY

## 2022-03-04 PROCEDURE — 3066F PR DOCUMENTATION OF TREATMENT FOR NEPHROPATHY: ICD-10-PCS | Mod: CPTII,S$GLB,, | Performed by: OPHTHALMOLOGY

## 2022-03-04 PROCEDURE — 1159F MED LIST DOCD IN RCRD: CPT | Mod: CPTII,S$GLB,, | Performed by: OPHTHALMOLOGY

## 2022-03-04 PROCEDURE — 1126F AMNT PAIN NOTED NONE PRSNT: CPT | Mod: CPTII,S$GLB,, | Performed by: OPHTHALMOLOGY

## 2022-03-04 PROCEDURE — 1160F RVW MEDS BY RX/DR IN RCRD: CPT | Mod: CPTII,S$GLB,, | Performed by: OPHTHALMOLOGY

## 2022-03-04 NOTE — PROGRESS NOTES
Subjective:       Patient ID: Driss Hernandez Jr. is a 71 y.o. male.    Chief Complaint: Post-op Evaluation    HPI     S/p Phaco w/IOL OS- 2/24/2022    70 y/o male is here for 1 week post-op of the LT eye. Pt reports vision is   doing well. Denies pain and discomfort.     Eyemeds  PF/Ket TID OS    Last edited by Flex Cantu on 3/4/2022  8:14 AM. (History)             Assessment:       1. Post-operative state    2. Pseudophakia        Plan:       S/p CE OS- Doing well.         Taper gtts OS.  RTC 3 wks.

## 2022-03-08 NOTE — PROGRESS NOTES
"  Physical Therapy Daily Treatment Note     Name: Driss Hernandez Jr.  Clinic Number: [id number]    Therapy Diagnosis:   Encounter Diagnoses   Name Primary?    Weakness of shoulder Yes    Winging of scapula     Impaired mobility and ADLs      Physician: Valerie Olivera MD    Visit Date: 3/9/2022    Physician Orders: PT Eval and Treat   Medical Diagnosis from Referral: Chronic right shoulder pain  Evaluation Date: 2/17/2022  Authorization Period Expiration: 1/10/23  Plan of Care Expiration: 4/10/22  Visit # / Visits authorized: 2/20 (+ eval)    Time In: 935  Time Out: 1035  Total Billable Time: 60 minutes    Precautions: Fall      Subjective     Pt reports: Pt states he had to move furniture yesterday so his shoulder hurt a lot last night. Pt states it is feeling a little better this morning. Pt states his pain is never bad in the morning but is always bad at night.   He was compliant with home exercise program.  Response to previous treatment: no adverse response  Functional change: none to report yet    Pain: 3/10  Location: right shoulder      Objective         Driss received therapeutic exercises to develop strength, endurance, ROM, flexibility, posture and core stabilization for 48 minutes including:    UBE 3'/3'  Pulleys Scap/Abd 2'/2'  R Upper Trap Str 3x30"  Standing Rows RTB 3x10 (try GTB NV)  Shld IR/ER RTB 3x10 ea (try GTB with IR NV)  Standing Horiz Abd RTB 20x +1/2 foam on wall  Eccentric Bicep Curl +5# 3x10 (against wall for TC)  Supine Chin Tuck 2x10   +LS stretch 2x30s (added to HEP)  +scalene stretch 2x30s (Added to HEP)  +Supine Serratus Punch 5# 3x10  +SL ER 2# 2x10  +SL Shld Abd 2# 2x10  +Prone Row 5# 2x10    Driss received the following manual therapy techniques: Joint mobilizations and Soft tissue Mobilization were applied to the: R Shoulder for 12 minutes, including:  Cervical Distraction + Suboccipital Release  STM to R UT, LS, scalenes,   R 1st rib mobs        Home Exercises Provided " and Patient Education Provided     Education provided: provided pt with printout of scalene and LS stretch and instructed to add to HEP performing daily- pt verbalized understanding and agreement.   Written Home Exercises Provided: yes.  Exercises were reviewed and Driss was able to demonstrate them prior to the end of the session.  Driss demonstrated good  understanding of the education provided.     See EMR under Patient Instructions for exercises provided prior visit.    Assessment     Pt continues with positive response to manual intervention with decreased mm tightness noted as well as decreased symptoms reported post techniques. Tightness in scalenes and LS present as well so added stretches accordingly. Introduced additional exercises today per PT plan with good tolerance noted. Pt required cues for scap recruitment with various exercises but demonstrated understanding and improved form/control following cues. Performed eccentric bicep curls against wall for tactile cues to prevent forward shoulder posture. Will continue addressing cervical component and progressing strengthening exercises as tolerated in future sessions.       Dirss Is progressing well towards his goals.   Pt prognosis is Fair.     Pt will continue to benefit from skilled outpatient physical therapy to address the deficits listed in the problem list box on initial evaluation, provide pt/family education and to maximize pt's level of independence in the home and community environment.     Pt's spiritual, cultural and educational needs considered and pt agreeable to plan of care and goals.     Anticipated barriers to physical therapy: COVID-19 Concerns    Goals:   Short Term GOALS: 4 weeks. Pt agrees with goals set.  1. Patient demonstrates independence with HEP.   2. Patient demonstrates independence with Postural Awareness.   3. Patient demonstrates independence with body mechanics.   4. Patient will report pain of 5/10 at worst, on 0-10 pain  scale, with all activity  5. Pt will increase R GHJ AROM by 10 degrees in elevation plane to improve functional reach pain free.   6. Pt will increase UE strength by 1/3 muscle grade or greater on MMT to improve tolerance to all functional activities pain free.     Long Term Goals 8 Weeks. Pt agrees with goals set:  1. Pt will increase R GHJ ROM symmetrical to L GHJ to improve functional reach pain free.   2. Pt will increase UE strength to 4/5 to improve tolerance to all functional activities pain free.   3. Pt demonstrates improved function per FOTO Shoulder Survey to 50% Limitation or less.   4. Patient will report pain of 2/10 at worst, on 0-10 pain scale, with all activity  5. Patient demonstrates ability to lift 10# object to overhead shelf with R UE, proper movement pattern, no pain provocation    Plan     Plan of care Certification: 2/17/2022 to 4/10/22.    Cont PT POC and progress strengthening exercises as tolerated. Cont MT for cervical/R shoulder    Kin Stallworth, PTA

## 2022-03-09 ENCOUNTER — CLINICAL SUPPORT (OUTPATIENT)
Dept: REHABILITATION | Facility: HOSPITAL | Age: 72
End: 2022-03-09
Attending: ORTHOPAEDIC SURGERY
Payer: MEDICARE

## 2022-03-09 DIAGNOSIS — R29.898 WEAKNESS OF SHOULDER: Primary | ICD-10-CM

## 2022-03-09 DIAGNOSIS — Z78.9 IMPAIRED MOBILITY AND ADLS: ICD-10-CM

## 2022-03-09 DIAGNOSIS — Z74.09 IMPAIRED MOBILITY AND ADLS: ICD-10-CM

## 2022-03-09 DIAGNOSIS — M95.8 WINGING OF SCAPULA: ICD-10-CM

## 2022-03-09 PROCEDURE — 97110 THERAPEUTIC EXERCISES: CPT | Mod: PN,CQ

## 2022-03-09 PROCEDURE — 97140 MANUAL THERAPY 1/> REGIONS: CPT | Mod: PN,CQ

## 2022-03-16 NOTE — PROGRESS NOTES
"  Physical Therapy Daily Treatment Note     Name: Driss Hernandez Jr.  Clinic Number: [id number]    Therapy Diagnosis:   Encounter Diagnoses   Name Primary?    Weakness of shoulder Yes    Winging of scapula     Impaired mobility and ADLs      Physician: Valerie Olivera MD    Visit Date: 3/17/2022    Physician Orders: PT Eval and Treat   Medical Diagnosis from Referral: Chronic right shoulder pain  Evaluation Date: 2/17/2022  Authorization Period Expiration: 1/10/23  Plan of Care Expiration: 4/10/22  Visit # / Visits authorized: 3/20 (+ eval)    Time In: 0805  Time Out: 0855  Total Billable Time: 50 minutes    Precautions: Fall      Subjective     Pt reports: Pt states he has been having more pain lately. Pt states pain is mostly at night however he is having some pain this morning. Pt states he does not think it was anything he done in therapy but the pain has been worse.   He was compliant with home exercise program.  Response to previous treatment: increased pain  Functional change: none to report yet    Pain: 5/10  Location: right shoulder      Objective         Driss received therapeutic exercises to develop strength, endurance, ROM, flexibility, posture and core stabilization for 38 minutes including:    UBE 3'/3'  Pulleys Scap/Abd 2'/2'  R Upper Trap Str 3x30"  Standing Rows RTB 3x10 (2x10 today)  Shld IR/ER RTB 3x10 ea (2x10 today)  Standing Horiz Abd RTB 20x  Eccentric Bicep Curl +5# 3x10 (against wall for TC) (2x10 today)  Supine Chin Tuck 2x10   LS stretch 2x30s (added to HEP)  scalene stretch 2x30s (Added to HEP)  Supine Serratus Punch 5# 3x10 (2x10 today)  SL ER 2# 2x10  SL Shld Abd 2# 2x10  Prone Row 5# 2x10    Driss received the following manual therapy techniques: Joint mobilizations and Soft tissue Mobilization were applied to the: R Shoulder for 12 minutes, including:  Cervical Distraction + Suboccipital Release  STM to R UT, LS, scalenes,   R 1st rib mobs        Home Exercises Provided and " Patient Education Provided     Education provided: provided pt with printout of scalene and LS stretch and instructed to add to HEP performing daily- pt verbalized understanding and agreement.   Written Home Exercises Provided: yes.  Exercises were reviewed and Driss was able to demonstrate them prior to the end of the session.  Driss demonstrated good  understanding of the education provided.     See EMR under Patient Instructions for exercises provided prior visit.    Assessment       Pt arrived to therapy with complaint of increased shoulder pain since last visit. Modified reps with various exercises to prevent further exacerbation of symptoms. Pt had positive response to manual intervention with overall decreased pain reported at end of session today. Will benefit from gradual rep progressions in upcoming session.        Driss Is progressing well towards his goals.   Pt prognosis is Fair.     Pt will continue to benefit from skilled outpatient physical therapy to address the deficits listed in the problem list box on initial evaluation, provide pt/family education and to maximize pt's level of independence in the home and community environment.     Pt's spiritual, cultural and educational needs considered and pt agreeable to plan of care and goals.     Anticipated barriers to physical therapy: COVID-19 Concerns    Goals:   Short Term GOALS: 4 weeks. Pt agrees with goals set.  1. Patient demonstrates independence with HEP.   2. Patient demonstrates independence with Postural Awareness.   3. Patient demonstrates independence with body mechanics.   4. Patient will report pain of 5/10 at worst, on 0-10 pain scale, with all activity  5. Pt will increase R GHJ AROM by 10 degrees in elevation plane to improve functional reach pain free.   6. Pt will increase UE strength by 1/3 muscle grade or greater on MMT to improve tolerance to all functional activities pain free.     Long Term Goals 8 Weeks. Pt agrees with goals  set:  1. Pt will increase R GHJ ROM symmetrical to L GHJ to improve functional reach pain free.   2. Pt will increase UE strength to 4/5 to improve tolerance to all functional activities pain free.   3. Pt demonstrates improved function per FOTO Shoulder Survey to 50% Limitation or less.   4. Patient will report pain of 2/10 at worst, on 0-10 pain scale, with all activity  5. Patient demonstrates ability to lift 10# object to overhead shelf with R UE, proper movement pattern, no pain provocation    Plan     Plan of care Certification: 2/17/2022 to 4/10/22.    Cont PT POC and progress strengthening exercises as tolerated. Cont MT for cervical/R shoulder    Kin Stallworth, PTA

## 2022-03-17 ENCOUNTER — CLINICAL SUPPORT (OUTPATIENT)
Dept: REHABILITATION | Facility: HOSPITAL | Age: 72
End: 2022-03-17
Attending: ORTHOPAEDIC SURGERY
Payer: MEDICARE

## 2022-03-17 DIAGNOSIS — M95.8 WINGING OF SCAPULA: ICD-10-CM

## 2022-03-17 DIAGNOSIS — R29.898 WEAKNESS OF SHOULDER: Primary | ICD-10-CM

## 2022-03-17 DIAGNOSIS — Z78.9 IMPAIRED MOBILITY AND ADLS: ICD-10-CM

## 2022-03-17 DIAGNOSIS — Z74.09 IMPAIRED MOBILITY AND ADLS: ICD-10-CM

## 2022-03-17 PROCEDURE — 97110 THERAPEUTIC EXERCISES: CPT | Mod: PN,CQ

## 2022-03-17 PROCEDURE — 97140 MANUAL THERAPY 1/> REGIONS: CPT | Mod: PN,CQ

## 2022-03-21 ENCOUNTER — OFFICE VISIT (OUTPATIENT)
Dept: FAMILY MEDICINE | Facility: CLINIC | Age: 72
End: 2022-03-21
Payer: MEDICARE

## 2022-03-21 VITALS
SYSTOLIC BLOOD PRESSURE: 150 MMHG | HEART RATE: 47 BPM | HEIGHT: 67 IN | WEIGHT: 193.88 LBS | DIASTOLIC BLOOD PRESSURE: 64 MMHG | TEMPERATURE: 99 F | BODY MASS INDEX: 30.43 KG/M2 | OXYGEN SATURATION: 98 %

## 2022-03-21 DIAGNOSIS — I10 ESSENTIAL HYPERTENSION: ICD-10-CM

## 2022-03-21 DIAGNOSIS — R05.9 COUGH: ICD-10-CM

## 2022-03-21 DIAGNOSIS — Z95.1 HX OF CABG: ICD-10-CM

## 2022-03-21 DIAGNOSIS — R06.02 SOB (SHORTNESS OF BREATH): Primary | ICD-10-CM

## 2022-03-21 DIAGNOSIS — I25.119 CORONARY ARTERY DISEASE INVOLVING NATIVE CORONARY ARTERY OF NATIVE HEART WITH ANGINA PECTORIS: ICD-10-CM

## 2022-03-21 DIAGNOSIS — E66.9 OBESITY (BMI 30.0-34.9): ICD-10-CM

## 2022-03-21 PROCEDURE — 3060F POS MICROALBUMINURIA REV: CPT | Mod: CPTII,S$GLB,, | Performed by: NURSE PRACTITIONER

## 2022-03-21 PROCEDURE — 1101F PR PT FALLS ASSESS DOC 0-1 FALLS W/OUT INJ PAST YR: ICD-10-PCS | Mod: CPTII,S$GLB,, | Performed by: NURSE PRACTITIONER

## 2022-03-21 PROCEDURE — 3288F PR FALLS RISK ASSESSMENT DOCUMENTED: ICD-10-PCS | Mod: CPTII,S$GLB,, | Performed by: NURSE PRACTITIONER

## 2022-03-21 PROCEDURE — 3078F DIAST BP <80 MM HG: CPT | Mod: CPTII,S$GLB,, | Performed by: NURSE PRACTITIONER

## 2022-03-21 PROCEDURE — 3008F PR BODY MASS INDEX (BMI) DOCUMENTED: ICD-10-PCS | Mod: CPTII,S$GLB,, | Performed by: NURSE PRACTITIONER

## 2022-03-21 PROCEDURE — 99999 PR PBB SHADOW E&M-EST. PATIENT-LVL V: ICD-10-PCS | Mod: PBBFAC,,, | Performed by: NURSE PRACTITIONER

## 2022-03-21 PROCEDURE — 3060F PR POS MICROALBUMINURIA RESULT DOCUMENTED/REVIEW: ICD-10-PCS | Mod: CPTII,S$GLB,, | Performed by: NURSE PRACTITIONER

## 2022-03-21 PROCEDURE — 3008F BODY MASS INDEX DOCD: CPT | Mod: CPTII,S$GLB,, | Performed by: NURSE PRACTITIONER

## 2022-03-21 PROCEDURE — 1125F AMNT PAIN NOTED PAIN PRSNT: CPT | Mod: CPTII,S$GLB,, | Performed by: NURSE PRACTITIONER

## 2022-03-21 PROCEDURE — 3072F PR LOW RISK FOR RETINOPATHY: ICD-10-PCS | Mod: CPTII,S$GLB,, | Performed by: NURSE PRACTITIONER

## 2022-03-21 PROCEDURE — 99214 PR OFFICE/OUTPT VISIT, EST, LEVL IV, 30-39 MIN: ICD-10-PCS | Mod: S$GLB,,, | Performed by: NURSE PRACTITIONER

## 2022-03-21 PROCEDURE — 1125F PR PAIN SEVERITY QUANTIFIED, PAIN PRESENT: ICD-10-PCS | Mod: CPTII,S$GLB,, | Performed by: NURSE PRACTITIONER

## 2022-03-21 PROCEDURE — 3066F NEPHROPATHY DOC TX: CPT | Mod: CPTII,S$GLB,, | Performed by: NURSE PRACTITIONER

## 2022-03-21 PROCEDURE — 3072F LOW RISK FOR RETINOPATHY: CPT | Mod: CPTII,S$GLB,, | Performed by: NURSE PRACTITIONER

## 2022-03-21 PROCEDURE — 3077F PR MOST RECENT SYSTOLIC BLOOD PRESSURE >= 140 MM HG: ICD-10-PCS | Mod: CPTII,S$GLB,, | Performed by: NURSE PRACTITIONER

## 2022-03-21 PROCEDURE — 3051F PR MOST RECENT HEMOGLOBIN A1C LEVEL 7.0 - < 8.0%: ICD-10-PCS | Mod: CPTII,S$GLB,, | Performed by: NURSE PRACTITIONER

## 2022-03-21 PROCEDURE — 1160F PR REVIEW ALL MEDS BY PRESCRIBER/CLIN PHARMACIST DOCUMENTED: ICD-10-PCS | Mod: CPTII,S$GLB,, | Performed by: NURSE PRACTITIONER

## 2022-03-21 PROCEDURE — 1159F PR MEDICATION LIST DOCUMENTED IN MEDICAL RECORD: ICD-10-PCS | Mod: CPTII,S$GLB,, | Performed by: NURSE PRACTITIONER

## 2022-03-21 PROCEDURE — 3288F FALL RISK ASSESSMENT DOCD: CPT | Mod: CPTII,S$GLB,, | Performed by: NURSE PRACTITIONER

## 2022-03-21 PROCEDURE — 3066F PR DOCUMENTATION OF TREATMENT FOR NEPHROPATHY: ICD-10-PCS | Mod: CPTII,S$GLB,, | Performed by: NURSE PRACTITIONER

## 2022-03-21 PROCEDURE — 1159F MED LIST DOCD IN RCRD: CPT | Mod: CPTII,S$GLB,, | Performed by: NURSE PRACTITIONER

## 2022-03-21 PROCEDURE — 3078F PR MOST RECENT DIASTOLIC BLOOD PRESSURE < 80 MM HG: ICD-10-PCS | Mod: CPTII,S$GLB,, | Performed by: NURSE PRACTITIONER

## 2022-03-21 PROCEDURE — 99214 OFFICE O/P EST MOD 30 MIN: CPT | Mod: S$GLB,,, | Performed by: NURSE PRACTITIONER

## 2022-03-21 PROCEDURE — 3051F HG A1C>EQUAL 7.0%<8.0%: CPT | Mod: CPTII,S$GLB,, | Performed by: NURSE PRACTITIONER

## 2022-03-21 PROCEDURE — 1101F PT FALLS ASSESS-DOCD LE1/YR: CPT | Mod: CPTII,S$GLB,, | Performed by: NURSE PRACTITIONER

## 2022-03-21 PROCEDURE — 99999 PR PBB SHADOW E&M-EST. PATIENT-LVL V: CPT | Mod: PBBFAC,,, | Performed by: NURSE PRACTITIONER

## 2022-03-21 PROCEDURE — 3077F SYST BP >= 140 MM HG: CPT | Mod: CPTII,S$GLB,, | Performed by: NURSE PRACTITIONER

## 2022-03-21 PROCEDURE — 1160F RVW MEDS BY RX/DR IN RCRD: CPT | Mod: CPTII,S$GLB,, | Performed by: NURSE PRACTITIONER

## 2022-03-21 NOTE — DISCHARGE SUMMARY
Discharge Diagnosis:Bilateral sacroiliitis [M46.1]  Condition on Discharge: Stable.  Diet on Discharge: Same as before.  Activity: as per instruction sheet.  Discharge to: Home with a responsible adult.  Follow up: as per Discharge instructions       Staged Advancement Flap Text: The defect edges were debeveled with a #15 scalpel blade.  Given the location of the defect, shape of the defect and the proximity to free margins a staged advancement flap was deemed most appropriate.  Using a sterile surgical marker, an appropriate advancement flap was drawn incorporating the defect and placing the expected incisions within the relaxed skin tension lines where possible. The area thus outlined was incised deep to adipose tissue with a #15 scalpel blade.  The skin margins were undermined to an appropriate distance in all directions utilizing iris scissors.

## 2022-03-21 NOTE — PROGRESS NOTES
Chief Complaint  Chief Complaint   Patient presents with    Cough     For about 6 months.    Shortness of Breath     SOB for about 3 weeks. Dypsnea on exertion. Waking up from sleep cant catch his breath       HPI    HPI   Mr. Driss Hernandez is a 71 y.o. male with medical problems as listed below. The patient presents to clinic with c/o cough for about 6 months. The patient also states that he has been having SOB for about a week. He has been having dyspnea on exertion. He states that he has been waking up from sleep and can not catch his breath. He states that he feels like an elephant is sitting on his chest.    The patient has a history of CABG. He was last seen by cardiology in may of 2021. Was supposed to follow up with cardiology in august but never did.     PAST MEDICAL HISTORY:  Past Medical History:   Diagnosis Date    Chronic midline low back pain without sciatica 10/2/2017    Colon polyps     Coronary artery disease involving native coronary artery of native heart 3/5/2020    Coronary artery disease involving native coronary artery of native heart with angina pectoris 12/10/2020    Diabetes mellitus with neurological manifestations, uncontrolled 1/24/2017    Diabetic polyneuropathy associated with type 2 diabetes mellitus 1/24/2017    Diabetic polyneuropathy associated with type 2 diabetes mellitus     Essential hypertension 1/24/2017    Gastroesophageal reflux disease 1/24/2017    Hyperlipidemia 1/24/2017    Insomnia 1/24/2017    Long-term insulin use 1/24/2017    Longstanding persistent atrial fibrillation 12/30/2020    Lumbar spondylosis 11/13/2017    Nuclear sclerosis of both eyes 8/24/2017    Obesity     Stage 3 chronic kidney disease 2/13/2019    Uncontrolled type 2 diabetes mellitus without complication, with long-term current use of insulin 1/24/2017       PAST SURGICAL HISTORY:  Past Surgical History:   Procedure Laterality Date    BACK SURGERY      2002    CATARACT  EXTRACTION W/  INTRAOCULAR LENS IMPLANT Right 08/29/2017    Dr. Hong    CATARACT EXTRACTION W/  INTRAOCULAR LENS IMPLANT Left 02/24/2022    Dr. Hong    COLONOSCOPY N/A 2/21/2017    Procedure: COLONOSCOPY;  Surgeon: Robb Rosado MD;  Location: [facility] ENDO;  Service: Endoscopy;  Laterality: N/A;    CORONARY ANGIOGRAPHY INCLUDING BYPASS GRAFTS WITH CATHETERIZATION OF LEFT HEART N/A 11/11/2020    Procedure: ANGIOGRAM, CORONARY, INCLUDING BYPASS GRAFT, WITH LEFT HEART CATHETERIZATION;  Surgeon: Lane Cuellar MD;  Location: [facility] CATH LAB;  Service: Cardiology;  Laterality: N/A;  RN PRE OP 11-5-2020. --COVID NEGATIVE ON  11-. C A    CORONARY ARTERY BYPASS GRAFT      March 2016    EPIDURAL STEROID INJECTION Bilateral 11/14/2018    Procedure: Lumbar Medial Branch Blocks;  Surgeon: Eze Byrd Jr., MD;  Location: [facility] ENDO;  Service: Pain Management;  Laterality: Bilateral;  Bilateral Lumbar Medial Branch Blocks L2, L3, L4, L5    [id number]  [id number]    Arrive @ 1150; NO Sedation    EPIDURAL STEROID INJECTION Bilateral 11/28/2018    Procedure: Injection, Steroid, Epidural;  Surgeon: Eze Byrd Jr., MD;  Location: [facility] ENDO;  Service: Pain Management;  Laterality: Bilateral;  Bilateral Sacroiliac Joint Steroid Injections    [id number]    Arrive @ 0910    EPIDURAL STEROID INJECTION Bilateral 3/20/2019    Procedure: Injection, Steroid, Sacroiliiac Joint;  Surgeon: Eze Byrd Jr., MD;  Location: [facility] ENDO;  Service: Pain Management;  Laterality: Bilateral;  Bilateral Sacroiliac Joint Steroid Injections    [id number]    Arrive @ 1145; Trigger point injections also?    INTRAOCULAR PROSTHESES INSERTION Left 2/24/2022    Procedure: INSERTION, IOL PROSTHESIS;  Surgeon: Nickolas Hong MD;  Location: [facility] OR;  Service: Ophthalmology;  Laterality: Left;    PHACOEMULSIFICATION OF CATARACT Left 2/24/2022    Procedure: PHACOEMULSIFICATION, CATARACT;  Surgeon: Nickolas Hong MD;   Location: [facility];  Service: Ophthalmology;  Laterality: Left;  RN Phone Pre Op 2-16-22.  Covid NEGATIVE  2-23-22.  Arrival 08:00 am.    TONSILLECTOMY         SOCIAL HISTORY:  Social History     Socioeconomic History    Marital status:    Tobacco Use    Smoking status: Never Smoker    Smokeless tobacco: Never Used   Substance and Sexual Activity    Alcohol use: Yes     Comment: SOCIALLY    Drug use: No    Sexual activity: Yes     Partners: Female     Social Determinants of Health     Financial Resource Strain: Low Risk     Difficulty of Paying Living Expenses: Not hard at all   Food Insecurity: No Food Insecurity    Worried About Running Out of Food in the Last Year: Never true    Ran Out of Food in the Last Year: Never true   Transportation Needs: No Transportation Needs    Lack of Transportation (Medical): No    Lack of Transportation (Non-Medical): No   Physical Activity: Insufficiently Active    Days of Exercise per Week: 3 days    Minutes of Exercise per Session: 20 min   Stress: No Stress Concern Present    Feeling of Stress : Only a little   Social Connections: Unknown    Frequency of Communication with Friends and Family: More than three times a week    Frequency of Social Gatherings with Friends and Family: More than three times a week    Active Member of Clubs or Organizations: Yes    Attends Club or Organization Meetings: More than 4 times per year    Marital Status:    Housing Stability: Low Risk     Unable to Pay for Housing in the Last Year: No    Number of Places Lived in the Last Year: 1    Unstable Housing in the Last Year: No       FAMILY HISTORY:  Family History   Problem Relation Age of Onset    Depression Mother     Heart disease Mother     Stroke Father     No Known Problems Sister     Blindness Brother     Diabetes Sister     No Known Problems Sister     No Known Problems Maternal Aunt     No Known Problems Maternal Uncle     No Known Problems  "Paternal Aunt     No Known Problems Paternal Uncle     No Known Problems Maternal Grandmother     No Known Problems Maternal Grandfather     No Known Problems Paternal Grandmother     No Known Problems Paternal Grandfather     Amblyopia Neg Hx     Cancer Neg Hx     Cataracts Neg Hx     Glaucoma Neg Hx     Hypertension Neg Hx     Macular degeneration Neg Hx     Retinal detachment Neg Hx     Strabismus Neg Hx     Thyroid disease Neg Hx        ALLERGIES AND MEDICATIONS: updated and reviewed.  Review of patient's allergies indicates:   Allergen Reactions    Penicillins Other (See Comments)     Unknown reaction, Had a reaction as a child    Shrimp Itching     Hand itching     Current Outpatient Medications   Medication Sig Dispense Refill    ACCU-CHEK MOIZ CONTROL SOLN Soln       ACCU-CHEK MOIZ PLUS TEST STRP Strp TEST THREE TIMES DAILY 300 strip 3    ACCU-CHEK SOFTCLIX LANCETS Misc       amLODIPine (NORVASC) 10 MG tablet TAKE 1 TABLET EVERY DAY 90 tablet 12    apixaban (ELIQUIS) 5 mg Tab Take 1 tablet (5 mg total) by mouth 2 (two) times daily. 180 tablet 3    ascorbic acid, vitamin C, (VITAMIN C) 100 MG tablet Take 100 mg by mouth daily as needed.      atorvastatin (LIPITOR) 40 MG tablet TAKE 1 TABLET EVERY MORNING 90 tablet 12    b complex vitamins tablet Take 1 tablet by mouth once daily.      BD ALCOHOL SWABS PadM       BD ULTRA-FINE ROLAND PEN NEEDLES 32 gauge x 5/32" Ndle       celecoxib (CELEBREX) 100 MG capsule Take 1 capsule (100 mg total) by mouth once daily. 30 capsule 6    celecoxib (CELEBREX) 100 MG capsule Take 1 capsule (100 mg total) by mouth once daily. 30 capsule 6    clopidogreL (PLAVIX) 75 mg tablet Take 1 tablet (75 mg total) by mouth once daily. 90 tablet 3    ergocalciferol (ERGOCALCIFEROL) 50,000 unit Cap TAKE ONE CAPSULE BY MOUTH WEEKLY 12 capsule 2    fenofibrate 160 MG Tab TAKE 1 TABLET EVERY MORNING 90 tablet 4    gabapentin (NEURONTIN) 100 MG capsule TAKE 2 " CAPSULES IN THE MORNING AND 3 CAPSULES WITH DINNER (Patient taking differently: Take 300 mg by mouth every evening. TAKE 2 CAPSULES IN THE MORNING AND 3 CAPSULES WITH DINNER) 450 capsule 0    glimepiride (AMARYL) 2 MG tablet TAKE 1 TABLET EVERY DAY BEFORE BREAKFAST 90 tablet 2    ILEVRO 0.3 % DrpS Place 1 drop into both eyes once daily. For 30 days 3 mL 1    insulin degludec (TRESIBA FLEXTOUCH U-100) 100 unit/mL (3 mL) insulin pen Inject 30 Units into the skin once daily.      magnesium 250 mg Tab Take 1 tablet by mouth once daily.       metFORMIN (GLUCOPHAGE-XR) 500 MG ER 24hr tablet Take 2 tablets twice daily with food 360 tablet 2    omega-3 acid ethyl esters (LOVAZA) 1 gram capsule Take 2 capsules (2 g total) by mouth 2 (two) times daily. 120 capsule 12    pantoprazole (PROTONIX) 40 MG tablet Take 1 tablet (40 mg total) by mouth once daily. 90 tablet 3    prednisoLONE acetate (PRED FORTE) 1 % DrpS Place 1 drop into the left eye 4 (four) times daily. 5 mL 1    semaglutide (OZEMPIC) 1 mg/dose (4 mg/3 mL) Inject 1 mg into the skin every 7 days. 1 pen 11    tiZANidine (ZANAFLEX) 4 MG tablet Take 4 mg by mouth every 6 (six) hours as needed.      triazolam (HALCION) 0.25 MG Tab TK 1 T PO QHS, prn 30 tablet 3    isosorbide mononitrate (IMDUR) 60 MG 24 hr tablet Take 1 tablet (60 mg total) by mouth once daily. (Patient not taking: Reported on 3/21/2022) 90 tablet 3    ketorolac 0.5% (ACULAR) 0.5 % Drop 4 (four) times daily.      LEXISCAN 0.4 mg/5 mL Syrg       LIDOCAINE VISCOUS 2 % solution       M-DRYL 12.5 mg/5 mL liquid SWISH AND SPIT 5 ML BY MOUTH EVERY 4 HOURS AS NEEDED FOR MOUTH SORES      nitroGLYCERIN (NITROSTAT) 0.4 MG SL tablet Place 1 tablet (0.4 mg total) under the tongue every 5 (five) minutes as needed for Chest pain. 25 tablet 11     No current facility-administered medications for this visit.     Facility-Administered Medications Ordered in Other Visits   Medication Dose Route Frequency  "Provider Last Rate Last Admin    cyclopentolate 1% ophthalmic solution 1 drop  1 drop Left Eye On Call Procedure Nickolas Hong MD   1 drop at 02/24/22 0901    ofloxacin 0.3 % ophthalmic solution 1 drop  1 drop Left Eye On Call Procedure Nickolas Hong MD   2 drop at 02/24/22 1017    sodium chloride 0.9% flush 10 mL  10 mL Intravenous PRN Nickolas Hong MD           Patient Care Team:  Jono Willoughby MD as PCP - General (Internal Medicine)  Ania Werner NP (Endocrinology)  Shirlene Rivera MA as Care Coordinator    ROS  Review of Systems   Constitutional: Negative for chills, fatigue, fever and unexpected weight change.   HENT: Negative for congestion, ear pain, sore throat and voice change.    Eyes: Negative for photophobia, pain, discharge and visual disturbance.   Respiratory: Positive for shortness of breath and wheezing. Negative for cough.    Cardiovascular: Positive for chest pain (heaviness). Negative for palpitations and leg swelling.   Gastrointestinal: Negative for abdominal pain, blood in stool, constipation, diarrhea, nausea and vomiting.   Genitourinary: Negative for dysuria and frequency.   Musculoskeletal: Positive for arthralgias (chronic). Negative for gait problem, joint swelling and neck stiffness.   Skin: Negative for color change and rash.   Neurological: Negative for seizures, weakness and headaches.   Hematological: Negative for adenopathy. Does not bruise/bleed easily.   Psychiatric/Behavioral: Negative for behavioral problems and dysphoric mood. The patient is not nervous/anxious.        Physical Exam  Vitals:    03/21/22 1341   BP: (!) 150/64   Pulse: (!) 47   Temp: 99.2 °F (37.3 °C)   TempSrc: Oral   SpO2: 98%   Weight: 87.9 kg (193 lb 14.3 oz)   Height: 5' 7" (1.702 m)    Body mass index is 30.37 kg/m².  Weight: 87.9 kg (193 lb 14.3 oz)   Height: 5' 7" (170.2 cm)     Physical Exam  Constitutional:       Appearance: He is well-developed.   HENT:      " Head: Normocephalic and atraumatic.   Eyes:      Conjunctiva/sclera: Conjunctivae normal.      Pupils: Pupils are equal, round, and reactive to light.   Neck:      Thyroid: No thyromegaly.   Cardiovascular:      Rate and Rhythm: Bradycardia present. Rhythm irregular.      Heart sounds: No murmur heard.  Pulmonary:      Effort: Pulmonary effort is normal.      Breath sounds: Normal breath sounds. No wheezing.   Musculoskeletal:         General: Normal range of motion.      Cervical back: Normal range of motion and neck supple.   Lymphadenopathy:      Cervical: No cervical adenopathy.   Skin:     General: Skin is warm and dry.      Findings: No rash.   Neurological:      Mental Status: He is alert and oriented to person, place, and time.      Cranial Nerves: No cranial nerve deficit.   Psychiatric:         Behavior: Behavior normal.         Thought Content: Thought content normal.         Judgment: Judgment normal.         Health Maintenance       Date Due Completion Date    TETANUS VACCINE Never done ---    Shingles Vaccine (1 of 2) Never done ---    Hemoglobin A1c 08/10/2022 2/10/2022    Eye Exam 11/11/2022 11/11/2021    Override on 1/10/2017: Done (External eye doctor)    Diabetes Urine Screening 02/10/2023 2/10/2022    Lipid Panel 02/10/2023 2/10/2022    Foot Exam 02/18/2023 2/18/2022    Override on 3/11/2020: Done    Override on 3/13/2018: Done (patient deferred, reports it was done by Dr. Agrawal (endocrinologist))    Override on 3/6/2017: Done (completed by Endocrinology (Dr. Agrawal's office))    High Dose Statin 03/04/2023 3/4/2022    Colorectal Cancer Screening 02/21/2027 2/21/2017      Health maintenance reviewed at this time.     Assessment & Plan  SOB (shortness of breath)  -     X-Ray Chest PA And Lateral; Future; Expected date: 03/21/2022  -     IN OFFICE EKG 12-LEAD (to Muse); Future; Expected date: 03/22/2022  The patient could not stay for the EKG or chest x-ray today.   Will have patient come back  tomorrow to do test  Offered to give the patient albuterol for SOB but patient declined  Will have the patient f/u with cardiology for further evaluation. The patient states that he may not want to f/u with cardiology    ED precautions dicussed.     Coronary artery disease involving native coronary artery of native heart with angina pectoris/Hx of CABG  -     IN OFFICE EKG 12-LEAD (to Muse); Future; Expected date: 03/22/2022    Follow up with cardiology    Essential hypertension  Elevated in clinic. Will have BP checked tomorrow with EKG.    Cough  -     X-Ray Chest PA And Lateral; Future; Expected date: 03/21/2022    Obesity (BMI 30.0-34.9)  The patient is asked to make an attempt to improve diet and exercise patterns to aid in medical management of this problem.           Follow-up: Follow up in about 1 day (around 3/22/2022) for for  EKG and chest x-ray; go to the ED for worsening symptoms.

## 2022-03-22 ENCOUNTER — TELEPHONE (OUTPATIENT)
Dept: FAMILY MEDICINE | Facility: CLINIC | Age: 72
End: 2022-03-22
Payer: MEDICARE

## 2022-03-22 ENCOUNTER — HOSPITAL ENCOUNTER (OUTPATIENT)
Dept: RADIOLOGY | Facility: HOSPITAL | Age: 72
Discharge: HOME | End: 2022-03-22
Attending: NURSE PRACTITIONER
Payer: MEDICARE

## 2022-03-22 ENCOUNTER — PATIENT OUTREACH (OUTPATIENT)
Dept: ADMINISTRATIVE | Facility: OTHER | Age: 72
End: 2022-03-22
Payer: MEDICARE

## 2022-03-22 ENCOUNTER — CLINICAL SUPPORT (OUTPATIENT)
Dept: FAMILY MEDICINE | Facility: CLINIC | Age: 72
End: 2022-03-22
Payer: MEDICARE

## 2022-03-22 DIAGNOSIS — R07.89 CHEST PRESSURE: Primary | ICD-10-CM

## 2022-03-22 DIAGNOSIS — R06.02 SOB (SHORTNESS OF BREATH): ICD-10-CM

## 2022-03-22 DIAGNOSIS — R06.02 SHORTNESS OF BREATH: Primary | ICD-10-CM

## 2022-03-22 DIAGNOSIS — I51.7 CARDIOMEGALY: Primary | ICD-10-CM

## 2022-03-22 DIAGNOSIS — I25.119 CORONARY ARTERY DISEASE INVOLVING NATIVE CORONARY ARTERY OF NATIVE HEART WITH ANGINA PECTORIS: ICD-10-CM

## 2022-03-22 DIAGNOSIS — R05.9 COUGH: ICD-10-CM

## 2022-03-22 DIAGNOSIS — R94.31 ABNORMAL EKG: ICD-10-CM

## 2022-03-22 PROCEDURE — 71046 X-RAY EXAM CHEST 2 VIEWS: CPT | Mod: 26,,, | Performed by: RADIOLOGY

## 2022-03-22 PROCEDURE — 93010 EKG 12-LEAD: ICD-10-PCS | Mod: S$GLB,,, | Performed by: INTERNAL MEDICINE

## 2022-03-22 PROCEDURE — 71046 X-RAY EXAM CHEST 2 VIEWS: CPT | Mod: TC,FY,PO

## 2022-03-22 PROCEDURE — 99999 PR PBB SHADOW E&M-EST. PATIENT-LVL I: CPT | Mod: PBBFAC,,,

## 2022-03-22 PROCEDURE — 93005 EKG 12-LEAD: ICD-10-PCS | Mod: S$GLB,,, | Performed by: NURSE PRACTITIONER

## 2022-03-22 PROCEDURE — 99999 PR PBB SHADOW E&M-EST. PATIENT-LVL I: ICD-10-PCS | Mod: PBBFAC,,,

## 2022-03-22 PROCEDURE — 71046 XR CHEST PA AND LATERAL: ICD-10-PCS | Mod: 26,,, | Performed by: RADIOLOGY

## 2022-03-22 PROCEDURE — 93005 ELECTROCARDIOGRAM TRACING: CPT | Mod: S$GLB,,, | Performed by: NURSE PRACTITIONER

## 2022-03-22 PROCEDURE — 93010 ELECTROCARDIOGRAM REPORT: CPT | Mod: S$GLB,,, | Performed by: INTERNAL MEDICINE

## 2022-03-22 RX ORDER — ALBUTEROL SULFATE 90 UG/1
2 AEROSOL, METERED RESPIRATORY (INHALATION) EVERY 4 HOURS PRN
Qty: 17 G | Refills: 3 | Status: SHIPPED | OUTPATIENT
Start: 2022-03-22 | End: 2022-03-24 | Stop reason: SDUPTHER

## 2022-03-22 NOTE — TELEPHONE ENCOUNTER
Spoke with pt he states that he would like the inhaler if you are able to provide it.. Allergies and pharmacy have been reviewed.

## 2022-03-22 NOTE — TELEPHONE ENCOUNTER
----- Message from Natalee Cano sent at 3/21/2022  3:50 PM CDT -----  Regarding: self [phone]  Type: Patient Call Back    Who called: self    What is the request in detail: patient would like provider to call him about inhaler.     Can the clinic reply by [facility]? no    Would the patient rather a call back or a response via [facility]?  Call back    Best call back number: [phone]

## 2022-03-22 NOTE — PROGRESS NOTES
"  Physical Therapy Daily Treatment Note     Name: Driss Hernandez Jr.  Clinic Number: [id number]    Therapy Diagnosis:   No diagnosis found.  Physician: Valerie Olivera MD    Visit Date: 3/23/2022    Physician Orders: PT Eval and Treat   Medical Diagnosis from Referral: Chronic right shoulder pain  Evaluation Date: 2/17/2022  Authorization Period Expiration: 1/10/23  Plan of Care Expiration: 4/10/22  Visit # / Visits authorized: 4/20 (+ eval)    Time In: 0935  Time Out: 1030  Total Billable Time: 55 minutes    Precautions: Fall      Subjective     Pt reports: Pt states his shoulder does not feel any worse since last visit. Hard to tell if it feels better. Pt states he recently found out he has an enlarged heart so has been going to the doctor the past few days. Pt states he has to go back to the heart doctor this afternoon.   He was compliant with home exercise program.  Response to previous treatment: no adverse response  Functional change: none to report yet    Pain: 5/10  Location: right shoulder      Objective         Driss received therapeutic exercises to develop strength, endurance, ROM, flexibility, posture and core stabilization for 40 minutes including: bolded=performed    UBE 3'/3'  Pulleys Scap/Abd 2'/2'  R Upper Trap Str 3x30"  Standing Rows RTB 3x10   Shld IR/ER RTB 3x10 ea   Standing Horiz Abd RTB 20x   Eccentric Bicep Curl +5# 3x10 (against wall for TC)   Supine Chin Tuck +3x10   LS stretch 2x30s   scalene stretch 2x30s   Supine Serratus Punch 5# 3x10   SL ER 2# 2x10  SL Shld Abd 2# 2x10  Prone Row 5# 2x10  +serratus wall slides 2x10    Driss received the following manual therapy techniques: Joint mobilizations and Soft tissue Mobilization were applied to the: R Shoulder for 15 minutes, including:  Cervical Distraction + Suboccipital Release  STM to R UT, LS, scalenes,   R 1st rib mobs        Home Exercises Provided and Patient Education Provided     Education provided:   Written Home Exercises " Provided: yes.  Exercises were reviewed and Driss was able to demonstrate them prior to the end of the session.  Driss demonstrated good  understanding of the education provided.     See EMR under Patient Instructions for exercises provided prior visit.    Assessment       Pt arrived to therapy with no significant change in symptoms reported. We were able to resume all modified reps from last visit without further exacerbation of symptoms today. Continued manual intervention secondary to positive symptom response reported previous visits. R 1st rib elevation remains present but improved with manual techniques. Pt instructed to continue performing neck stretches regularly at home in order to decrease tightness. Will continue progressing as tolerated.         Driss Is progressing well towards his goals.   Pt prognosis is Fair.     Pt will continue to benefit from skilled outpatient physical therapy to address the deficits listed in the problem list box on initial evaluation, provide pt/family education and to maximize pt's level of independence in the home and community environment.     Pt's spiritual, cultural and educational needs considered and pt agreeable to plan of care and goals.     Anticipated barriers to physical therapy: COVID-19 Concerns    Goals:   Short Term GOALS: 4 weeks. Pt agrees with goals set.  1. Patient demonstrates independence with HEP.   2. Patient demonstrates independence with Postural Awareness.   3. Patient demonstrates independence with body mechanics.   4. Patient will report pain of 5/10 at worst, on 0-10 pain scale, with all activity  5. Pt will increase R GHJ AROM by 10 degrees in elevation plane to improve functional reach pain free.   6. Pt will increase UE strength by 1/3 muscle grade or greater on MMT to improve tolerance to all functional activities pain free.     Long Term Goals 8 Weeks. Pt agrees with goals set:  1. Pt will increase R GHJ ROM symmetrical to L GHJ to improve  functional reach pain free.   2. Pt will increase UE strength to 4/5 to improve tolerance to all functional activities pain free.   3. Pt demonstrates improved function per FOTO Shoulder Survey to 50% Limitation or less.   4. Patient will report pain of 2/10 at worst, on 0-10 pain scale, with all activity  5. Patient demonstrates ability to lift 10# object to overhead shelf with R UE, proper movement pattern, no pain provocation    Plan     Plan of care Certification: 2/17/2022 to 4/10/22.    Cont PT POC and progress strengthening exercises as tolerated. Cont MT for cervical/R shoulder    Kin Stallworth, PTA

## 2022-03-23 ENCOUNTER — CLINICAL SUPPORT (OUTPATIENT)
Dept: REHABILITATION | Facility: HOSPITAL | Age: 72
End: 2022-03-23
Attending: ORTHOPAEDIC SURGERY
Payer: MEDICARE

## 2022-03-23 ENCOUNTER — OFFICE VISIT (OUTPATIENT)
Dept: CARDIOLOGY | Facility: CLINIC | Age: 72
End: 2022-03-23
Payer: MEDICARE

## 2022-03-23 VITALS
RESPIRATION RATE: 15 BRPM | OXYGEN SATURATION: 99 % | HEART RATE: 67 BPM | WEIGHT: 196.88 LBS | DIASTOLIC BLOOD PRESSURE: 62 MMHG | HEIGHT: 67 IN | SYSTOLIC BLOOD PRESSURE: 154 MMHG | BODY MASS INDEX: 30.9 KG/M2

## 2022-03-23 DIAGNOSIS — I48.11 LONGSTANDING PERSISTENT ATRIAL FIBRILLATION: ICD-10-CM

## 2022-03-23 DIAGNOSIS — R94.31 ABNORMAL EKG: ICD-10-CM

## 2022-03-23 DIAGNOSIS — Z79.4 DIABETES MELLITUS DUE TO UNDERLYING CONDITION WITH STAGE 3A CHRONIC KIDNEY DISEASE, WITH LONG-TERM CURRENT USE OF INSULIN: ICD-10-CM

## 2022-03-23 DIAGNOSIS — E78.2 MIXED HYPERLIPIDEMIA: ICD-10-CM

## 2022-03-23 DIAGNOSIS — E66.9 OBESITY (BMI 30.0-34.9): ICD-10-CM

## 2022-03-23 DIAGNOSIS — I70.0 AORTIC ATHEROSCLEROSIS: ICD-10-CM

## 2022-03-23 DIAGNOSIS — Z95.1 HX OF CABG: ICD-10-CM

## 2022-03-23 DIAGNOSIS — E08.22 DIABETES MELLITUS DUE TO UNDERLYING CONDITION WITH STAGE 3A CHRONIC KIDNEY DISEASE, WITH LONG-TERM CURRENT USE OF INSULIN: ICD-10-CM

## 2022-03-23 DIAGNOSIS — I51.7 CARDIOMEGALY: ICD-10-CM

## 2022-03-23 DIAGNOSIS — N18.31 STAGE 3A CHRONIC KIDNEY DISEASE: ICD-10-CM

## 2022-03-23 DIAGNOSIS — Z78.9 IMPAIRED MOBILITY AND ADLS: ICD-10-CM

## 2022-03-23 DIAGNOSIS — R29.898 WEAKNESS OF SHOULDER: Primary | ICD-10-CM

## 2022-03-23 DIAGNOSIS — I25.700 CORONARY ARTERY DISEASE INVOLVING CORONARY BYPASS GRAFT OF NATIVE HEART WITH UNSTABLE ANGINA PECTORIS: Primary | ICD-10-CM

## 2022-03-23 DIAGNOSIS — Z79.01 LONG TERM (CURRENT) USE OF ANTICOAGULANTS: ICD-10-CM

## 2022-03-23 DIAGNOSIS — I65.23 ATHEROSCLEROSIS OF BOTH CAROTID ARTERIES: ICD-10-CM

## 2022-03-23 DIAGNOSIS — I73.9 PAD (PERIPHERAL ARTERY DISEASE): ICD-10-CM

## 2022-03-23 DIAGNOSIS — I10 ESSENTIAL HYPERTENSION: ICD-10-CM

## 2022-03-23 DIAGNOSIS — M95.8 WINGING OF SCAPULA: ICD-10-CM

## 2022-03-23 DIAGNOSIS — R06.02 SHORTNESS OF BREATH: ICD-10-CM

## 2022-03-23 DIAGNOSIS — Z74.09 IMPAIRED MOBILITY AND ADLS: ICD-10-CM

## 2022-03-23 DIAGNOSIS — I25.119 CORONARY ARTERY DISEASE INVOLVING NATIVE CORONARY ARTERY OF NATIVE HEART WITH ANGINA PECTORIS: ICD-10-CM

## 2022-03-23 DIAGNOSIS — N18.31 DIABETES MELLITUS DUE TO UNDERLYING CONDITION WITH STAGE 3A CHRONIC KIDNEY DISEASE, WITH LONG-TERM CURRENT USE OF INSULIN: ICD-10-CM

## 2022-03-23 PROCEDURE — 3077F PR MOST RECENT SYSTOLIC BLOOD PRESSURE >= 140 MM HG: ICD-10-PCS | Mod: CPTII,S$GLB,, | Performed by: INTERNAL MEDICINE

## 2022-03-23 PROCEDURE — 3060F PR POS MICROALBUMINURIA RESULT DOCUMENTED/REVIEW: ICD-10-PCS | Mod: CPTII,S$GLB,, | Performed by: INTERNAL MEDICINE

## 2022-03-23 PROCEDURE — 1101F PT FALLS ASSESS-DOCD LE1/YR: CPT | Mod: CPTII,S$GLB,, | Performed by: INTERNAL MEDICINE

## 2022-03-23 PROCEDURE — 99999 PR PBB SHADOW E&M-EST. PATIENT-LVL III: CPT | Mod: PBBFAC,,, | Performed by: INTERNAL MEDICINE

## 2022-03-23 PROCEDURE — 3072F PR LOW RISK FOR RETINOPATHY: ICD-10-PCS | Mod: CPTII,S$GLB,, | Performed by: INTERNAL MEDICINE

## 2022-03-23 PROCEDURE — 3078F PR MOST RECENT DIASTOLIC BLOOD PRESSURE < 80 MM HG: ICD-10-PCS | Mod: CPTII,S$GLB,, | Performed by: INTERNAL MEDICINE

## 2022-03-23 PROCEDURE — 3066F NEPHROPATHY DOC TX: CPT | Mod: CPTII,S$GLB,, | Performed by: INTERNAL MEDICINE

## 2022-03-23 PROCEDURE — 93000 ELECTROCARDIOGRAM COMPLETE: CPT | Mod: S$GLB,,, | Performed by: INTERNAL MEDICINE

## 2022-03-23 PROCEDURE — 3060F POS MICROALBUMINURIA REV: CPT | Mod: CPTII,S$GLB,, | Performed by: INTERNAL MEDICINE

## 2022-03-23 PROCEDURE — 93000 EKG 12-LEAD: ICD-10-PCS | Mod: S$GLB,,, | Performed by: INTERNAL MEDICINE

## 2022-03-23 PROCEDURE — 3008F BODY MASS INDEX DOCD: CPT | Mod: CPTII,S$GLB,, | Performed by: INTERNAL MEDICINE

## 2022-03-23 PROCEDURE — 99215 PR OFFICE/OUTPT VISIT, EST, LEVL V, 40-54 MIN: ICD-10-PCS | Mod: S$GLB,,, | Performed by: INTERNAL MEDICINE

## 2022-03-23 PROCEDURE — 99999 PR PBB SHADOW E&M-EST. PATIENT-LVL III: ICD-10-PCS | Mod: PBBFAC,,, | Performed by: INTERNAL MEDICINE

## 2022-03-23 PROCEDURE — 1160F RVW MEDS BY RX/DR IN RCRD: CPT | Mod: CPTII,S$GLB,, | Performed by: INTERNAL MEDICINE

## 2022-03-23 PROCEDURE — 3288F FALL RISK ASSESSMENT DOCD: CPT | Mod: CPTII,S$GLB,, | Performed by: INTERNAL MEDICINE

## 2022-03-23 PROCEDURE — 99215 OFFICE O/P EST HI 40 MIN: CPT | Mod: S$GLB,,, | Performed by: INTERNAL MEDICINE

## 2022-03-23 PROCEDURE — 3072F LOW RISK FOR RETINOPATHY: CPT | Mod: CPTII,S$GLB,, | Performed by: INTERNAL MEDICINE

## 2022-03-23 PROCEDURE — 1159F MED LIST DOCD IN RCRD: CPT | Mod: CPTII,S$GLB,, | Performed by: INTERNAL MEDICINE

## 2022-03-23 PROCEDURE — 1126F PR PAIN SEVERITY QUANTIFIED, NO PAIN PRESENT: ICD-10-PCS | Mod: CPTII,S$GLB,, | Performed by: INTERNAL MEDICINE

## 2022-03-23 PROCEDURE — 97140 MANUAL THERAPY 1/> REGIONS: CPT | Mod: PN,CQ

## 2022-03-23 PROCEDURE — 3288F PR FALLS RISK ASSESSMENT DOCUMENTED: ICD-10-PCS | Mod: CPTII,S$GLB,, | Performed by: INTERNAL MEDICINE

## 2022-03-23 PROCEDURE — 1101F PR PT FALLS ASSESS DOC 0-1 FALLS W/OUT INJ PAST YR: ICD-10-PCS | Mod: CPTII,S$GLB,, | Performed by: INTERNAL MEDICINE

## 2022-03-23 PROCEDURE — 1160F PR REVIEW ALL MEDS BY PRESCRIBER/CLIN PHARMACIST DOCUMENTED: ICD-10-PCS | Mod: CPTII,S$GLB,, | Performed by: INTERNAL MEDICINE

## 2022-03-23 PROCEDURE — 3008F PR BODY MASS INDEX (BMI) DOCUMENTED: ICD-10-PCS | Mod: CPTII,S$GLB,, | Performed by: INTERNAL MEDICINE

## 2022-03-23 PROCEDURE — 3077F SYST BP >= 140 MM HG: CPT | Mod: CPTII,S$GLB,, | Performed by: INTERNAL MEDICINE

## 2022-03-23 PROCEDURE — 97110 THERAPEUTIC EXERCISES: CPT | Mod: PN,CQ

## 2022-03-23 PROCEDURE — 3051F PR MOST RECENT HEMOGLOBIN A1C LEVEL 7.0 - < 8.0%: ICD-10-PCS | Mod: CPTII,S$GLB,, | Performed by: INTERNAL MEDICINE

## 2022-03-23 PROCEDURE — 3051F HG A1C>EQUAL 7.0%<8.0%: CPT | Mod: CPTII,S$GLB,, | Performed by: INTERNAL MEDICINE

## 2022-03-23 PROCEDURE — 3078F DIAST BP <80 MM HG: CPT | Mod: CPTII,S$GLB,, | Performed by: INTERNAL MEDICINE

## 2022-03-23 PROCEDURE — 1159F PR MEDICATION LIST DOCUMENTED IN MEDICAL RECORD: ICD-10-PCS | Mod: CPTII,S$GLB,, | Performed by: INTERNAL MEDICINE

## 2022-03-23 PROCEDURE — 1126F AMNT PAIN NOTED NONE PRSNT: CPT | Mod: CPTII,S$GLB,, | Performed by: INTERNAL MEDICINE

## 2022-03-23 PROCEDURE — 3066F PR DOCUMENTATION OF TREATMENT FOR NEPHROPATHY: ICD-10-PCS | Mod: CPTII,S$GLB,, | Performed by: INTERNAL MEDICINE

## 2022-03-23 RX ORDER — ALBUTEROL SULFATE 90 UG/1
2 AEROSOL, METERED RESPIRATORY (INHALATION) EVERY 4 HOURS PRN
Qty: 17 G | Refills: 3 | Status: CANCELLED | OUTPATIENT
Start: 2022-03-23

## 2022-03-23 RX ORDER — FUROSEMIDE 20 MG/1
20 TABLET ORAL 2 TIMES DAILY
Qty: 180 TABLET | Refills: 3 | Status: SHIPPED | OUTPATIENT
Start: 2022-03-23 | End: 2022-03-23 | Stop reason: SDUPTHER

## 2022-03-23 RX ORDER — ISOSORBIDE MONONITRATE 120 MG/1
120 TABLET, EXTENDED RELEASE ORAL DAILY
Qty: 90 TABLET | Refills: 3 | Status: SHIPPED | OUTPATIENT
Start: 2022-03-23 | End: 2022-03-25 | Stop reason: SDUPTHER

## 2022-03-23 NOTE — PROGRESS NOTES
CARDIOVASCULAR PROGRESS NOTE    REASON FOR CONSULT:   Driss Hernandez Jr. is a 71 y.o. male who presents for follow up of CAD.    PCP: Adileneensing  HISTORY OF PRESENT ILLNESS:   Prev followed by Dr. Cuellar, now asking to switch to my practice.    The patient comes in today at his urgent request.  He is describing progressive shortness of breath and dyspnea on exertion as well as exertional angina of a class 3 nature.  He is having no symptoms at rest.  He denies any palpitations or syncope.  He does report orthopnea and cough when lying flat.  There has been no paroxysmal nocturnal dyspnea.  He denies any melena, hematuria, or claudicant symptoms.  He does report bilateral lower extremity edema.  He is currently taking both Eliquis and clopidogrel.    CARDIOVASCULAR HISTORY:   CAD 2016 CABG x3 (LIMA-LAD, SVG-D, SVG-PDA)   11/11/20 cath with diffuse native disease, patent SVG x2, LIMA-LAD atretic   12/10/20: PCI OM2 2.25x26 Resolute Isiah RICO    HFpEF    PAD    Chr AF on eliquis 5mg bid    PAST MEDICAL HISTORY:     Past Medical History:   Diagnosis Date    Chronic midline low back pain without sciatica 10/2/2017    Colon polyps     Coronary artery disease involving native coronary artery of native heart 3/5/2020    Coronary artery disease involving native coronary artery of native heart with angina pectoris 12/10/2020    Diabetes mellitus with neurological manifestations, uncontrolled 1/24/2017    Diabetic polyneuropathy associated with type 2 diabetes mellitus 1/24/2017    Diabetic polyneuropathy associated with type 2 diabetes mellitus     Essential hypertension 1/24/2017    Gastroesophageal reflux disease 1/24/2017    Hyperlipidemia 1/24/2017    Insomnia 1/24/2017    Long-term insulin use 1/24/2017    Longstanding persistent atrial fibrillation 12/30/2020    Lumbar spondylosis 11/13/2017    Nuclear sclerosis of both eyes 8/24/2017    Obesity     Stage 3 chronic kidney disease 2/13/2019     Uncontrolled type 2 diabetes mellitus without complication, with long-term current use of insulin 1/24/2017       PAST SURGICAL HISTORY:     Past Surgical History:   Procedure Laterality Date    BACK SURGERY      2002    CATARACT EXTRACTION W/  INTRAOCULAR LENS IMPLANT Right 08/29/2017    Dr. Hong    CATARACT EXTRACTION W/  INTRAOCULAR LENS IMPLANT Left 02/24/2022    Dr. Hong    COLONOSCOPY N/A 2/21/2017    Procedure: COLONOSCOPY;  Surgeon: Robb Rosado MD;  Location: [facility] ENDO;  Service: Endoscopy;  Laterality: N/A;    CORONARY ANGIOGRAPHY INCLUDING BYPASS GRAFTS WITH CATHETERIZATION OF LEFT HEART N/A 11/11/2020    Procedure: ANGIOGRAM, CORONARY, INCLUDING BYPASS GRAFT, WITH LEFT HEART CATHETERIZATION;  Surgeon: Lane Cuellar MD;  Location: [facility] CATH LAB;  Service: Cardiology;  Laterality: N/A;  RN PRE OP 11-5-2020. --COVID NEGATIVE ON  11-. C A    CORONARY ARTERY BYPASS GRAFT      March 2016    EPIDURAL STEROID INJECTION Bilateral 11/14/2018    Procedure: Lumbar Medial Branch Blocks;  Surgeon: Eze Byrd Jr., MD;  Location: [facility];  Service: Pain Management;  Laterality: Bilateral;  Bilateral Lumbar Medial Branch Blocks L2, L3, L4, L5    [id number]  [id number]    Arrive @ 1150; NO Sedation    EPIDURAL STEROID INJECTION Bilateral 11/28/2018    Procedure: Injection, Steroid, Epidural;  Surgeon: Eze Byrd Jr., MD;  Location: [facility];  Service: Pain Management;  Laterality: Bilateral;  Bilateral Sacroiliac Joint Steroid Injections    [id number]    Arrive @ 0910    EPIDURAL STEROID INJECTION Bilateral 3/20/2019    Procedure: Injection, Steroid, Sacroiliiac Joint;  Surgeon: Eze Byrd Jr., MD;  Location: [facility];  Service: Pain Management;  Laterality: Bilateral;  Bilateral Sacroiliac Joint Steroid Injections    [id number]    Arrive @ 1145; Trigger point injections also?    INTRAOCULAR PROSTHESES INSERTION Left 2/24/2022    Procedure: INSERTION, IOL PROSTHESIS;   "Surgeon: Nikcolas Hong MD;  Location: [facility] OR;  Service: Ophthalmology;  Laterality: Left;    PHACOEMULSIFICATION OF CATARACT Left 2/24/2022    Procedure: PHACOEMULSIFICATION, CATARACT;  Surgeon: Nickolas Hong MD;  Location: [facility] OR;  Service: Ophthalmology;  Laterality: Left;  RN Phone Pre Op 2-16-22.  Covid NEGATIVE  2-23-22.  Arrival 08:00 am.    TONSILLECTOMY         ALLERGIES AND MEDICATION:     Review of patient's allergies indicates:   Allergen Reactions    Penicillins Other (See Comments)     Unknown reaction, Had a reaction as a child    Shrimp Itching     Hand itching        Medication List          Accurate as of March 23, 2022  1:13 PM. If you have any questions, ask your nurse or doctor.            CHANGE how you take these medications    gabapentin 100 MG capsule  Commonly known as: NEURONTIN  TAKE 2 CAPSULES IN THE MORNING AND 3 CAPSULES WITH DINNER  What changed:   · how much to take  · how to take this  · when to take this        CONTINUE taking these medications    ACCU-CHEK MOIZ CONTROL SOLN Soln  Generic drug: blood glucose control high,low     ACCU-CHEK MOIZ PLUS TEST STRP Strp  Generic drug: blood sugar diagnostic  TEST THREE TIMES DAILY     ACCU-CHEK SOFTCLIX LANCETS Misc  Generic drug: lancets     albuterol 90 mcg/actuation inhaler  Commonly known as: PROVENTIL/VENTOLIN HFA  Inhale 2 puffs into the lungs every 4 (four) hours as needed for Shortness of Breath. Rescue     amLODIPine 10 MG tablet  Commonly known as: NORVASC  TAKE 1 TABLET EVERY DAY     apixaban 5 mg Tab  Commonly known as: ELIQUIS  Take 1 tablet (5 mg total) by mouth 2 (two) times daily.     ascorbic acid (vitamin C) 100 MG tablet  Commonly known as: VITAMIN C     atorvastatin 40 MG tablet  Commonly known as: LIPITOR  TAKE 1 TABLET EVERY MORNING     b complex vitamins tablet     BD ALCOHOL SWABS Padm  Generic drug: alcohol swabs     BD ULTRA-FINE ROLAND PEN NEEDLE 32 gauge x 5/32" Ndle  Generic drug: pen " needle, diabetic     * celecoxib 100 MG capsule  Commonly known as: CeleBREX  Take 1 capsule (100 mg total) by mouth once daily.     * celecoxib 100 MG capsule  Commonly known as: CeleBREX  Take 1 capsule (100 mg total) by mouth once daily.     clopidogreL 75 mg tablet  Commonly known as: PLAVIX  Take 1 tablet (75 mg total) by mouth once daily.     ergocalciferol 50,000 unit Cap  Commonly known as: ERGOCALCIFEROL  TAKE ONE CAPSULE BY MOUTH WEEKLY     fenofibrate 160 MG Tab  TAKE 1 TABLET EVERY MORNING     glimepiride 2 MG tablet  Commonly known as: AMARYL  TAKE 1 TABLET EVERY DAY BEFORE BREAKFAST     ILEVRO 0.3 % Drps  Generic drug: nepafenac  Place 1 drop into both eyes once daily. For 30 days     isosorbide mononitrate 60 MG 24 hr tablet  Commonly known as: IMDUR  Take 1 tablet (60 mg total) by mouth once daily.     magnesium 250 mg Tab     metFORMIN 500 MG ER 24hr tablet  Commonly known as: GLUCOPHAGE-XR  Take 2 tablets twice daily with food     nitroGLYCERIN 0.4 MG SL tablet  Commonly known as: NITROSTAT  Place 1 tablet (0.4 mg total) under the tongue every 5 (five) minutes as needed for Chest pain.     omega-3 acid ethyl esters 1 gram capsule  Commonly known as: LOVAZA  Take 2 capsules (2 g total) by mouth 2 (two) times daily.     pantoprazole 40 MG tablet  Commonly known as: PROTONIX  Take 1 tablet (40 mg total) by mouth once daily.     prednisoLONE acetate 1 % Drps  Commonly known as: PRED FORTE  Place 1 drop into the left eye 4 (four) times daily.     semaglutide 1 mg/dose (4 mg/3 mL)  Commonly known as: OZEMPIC  Inject 1 mg into the skin every 7 days.     tiZANidine 4 MG tablet  Commonly known as: ZANAFLEX     TRESIBA FLEXTOUCH U-100 100 unit/mL (3 mL) insulin pen  Generic drug: insulin degludec  Inject 30 Units into the skin once daily.     triazolam 0.25 MG Tab  Commonly known as: HALCION  TK 1 T PO QHS, prn         * This list has 2 medication(s) that are the same as other medications prescribed for you.  Read the directions carefully, and ask your doctor or other care provider to review them with you.                SOCIAL HISTORY:     Social History     Socioeconomic History    Marital status:    Tobacco Use    Smoking status: Never Smoker    Smokeless tobacco: Never Used   Substance and Sexual Activity    Alcohol use: Yes     Comment: SOCIALLY    Drug use: No    Sexual activity: Yes     Partners: Female     Social Determinants of Health     Financial Resource Strain: Low Risk     Difficulty of Paying Living Expenses: Not hard at all   Food Insecurity: No Food Insecurity    Worried About Running Out of Food in the Last Year: Never true    Ran Out of Food in the Last Year: Never true   Transportation Needs: No Transportation Needs    Lack of Transportation (Medical): No    Lack of Transportation (Non-Medical): No   Physical Activity: Insufficiently Active    Days of Exercise per Week: 3 days    Minutes of Exercise per Session: 20 min   Stress: Stress Concern Present    Feeling of Stress : To some extent   Social Connections: Unknown    Frequency of Communication with Friends and Family: More than three times a week    Frequency of Social Gatherings with Friends and Family: More than three times a week    Active Member of Clubs or Organizations: Yes    Attends Club or Organization Meetings: More than 4 times per year    Marital Status:    Housing Stability: Low Risk     Unable to Pay for Housing in the Last Year: No    Number of Places Lived in the Last Year: 1    Unstable Housing in the Last Year: No       FAMILY HISTORY:     Family History   Problem Relation Age of Onset    Depression Mother     Heart disease Mother     Stroke Father     No Known Problems Sister     Blindness Brother     Diabetes Sister     No Known Problems Sister     No Known Problems Maternal Aunt     No Known Problems Maternal Uncle     No Known Problems Paternal Aunt     No Known Problems Paternal  "Uncle     No Known Problems Maternal Grandmother     No Known Problems Maternal Grandfather     No Known Problems Paternal Grandmother     No Known Problems Paternal Grandfather     Amblyopia Neg Hx     Cancer Neg Hx     Cataracts Neg Hx     Glaucoma Neg Hx     Hypertension Neg Hx     Macular degeneration Neg Hx     Retinal detachment Neg Hx     Strabismus Neg Hx     Thyroid disease Neg Hx        REVIEW OF SYSTEMS:   Review of Systems   Constitutional: Negative for chills, diaphoresis and fever.   HENT: Negative for nosebleeds.    Eyes: Negative for blurred vision, double vision and photophobia.   Respiratory: Positive for cough and shortness of breath. Negative for hemoptysis and wheezing.    Cardiovascular: Positive for chest pain, orthopnea and leg swelling. Negative for palpitations, claudication and PND.   Gastrointestinal: Negative for abdominal pain, blood in stool, heartburn, melena, nausea and vomiting.   Genitourinary: Negative for flank pain and hematuria.   Musculoskeletal: Negative for falls, myalgias and neck pain.   Skin: Negative for rash.   Neurological: Negative for dizziness, seizures, loss of consciousness, weakness and headaches.   Endo/Heme/Allergies: Negative for polydipsia. Does not bruise/bleed easily.   Psychiatric/Behavioral: Negative for depression and memory loss. The patient is not nervous/anxious.        PHYSICAL EXAM:     Vitals:    03/23/22 1303   BP: (!) 154/62   Pulse: 67   Resp: 15    Body mass index is 30.83 kg/m².  Weight: 89.3 kg (196 lb 13.9 oz)   Height: 5' 7" (170.2 cm)     Physical Exam  Vitals reviewed.   Constitutional:       General: He is not in acute distress.     Appearance: He is well-developed. He is obese. He is not ill-appearing, toxic-appearing or diaphoretic.   HENT:      Head: Normocephalic and atraumatic.   Eyes:      General: No scleral icterus.     Extraocular Movements: Extraocular movements intact.      Conjunctiva/sclera: Conjunctivae normal. "      Pupils: Pupils are equal, round, and reactive to light.   Neck:      Thyroid: No thyromegaly.      Vascular: Normal carotid pulses. No carotid bruit or JVD.      Trachea: Trachea normal.   Cardiovascular:      Rate and Rhythm: Bradycardia present. Rhythm irregularly irregular.      Heart sounds: S1 normal and S2 normal. Heart sounds are distant. No murmur heard.    No friction rub. No gallop.   Pulmonary:      Effort: Pulmonary effort is normal. No respiratory distress.      Breath sounds: Normal breath sounds. No stridor. No wheezing, rhonchi or rales.   Chest:      Chest wall: No tenderness.   Abdominal:      General: There is no distension.      Palpations: Abdomen is soft.   Musculoskeletal:         General: No tenderness. Normal range of motion.      Cervical back: Normal range of motion and neck supple. No edema or rigidity.      Right lower leg: Edema present.      Left lower leg: Edema present.   Feet:      Right foot:      Skin integrity: No ulcer.      Left foot:      Skin integrity: No ulcer.   Skin:     General: Skin is warm and dry.      Coloration: Skin is not jaundiced.   Neurological:      General: No focal deficit present.      Mental Status: He is alert and oriented to person, place, and time.      Cranial Nerves: No cranial nerve deficit.   Psychiatric:         Mood and Affect: Mood normal.         Speech: Speech normal.         Behavior: Behavior normal. Behavior is cooperative.         DATA:   EKG: (personally reviewed tracing)  3/23/22 AF 74, NSSTTW changes    Laboratory:  CBC:  Recent Labs   Lab 08/18/20  0928 11/05/20  1305 12/07/20  1200   WBC 7.02 7.99 5.82   Hemoglobin 11.5 L 12.2 L 10.6 L   Hematocrit 39.7 L 39.2 L 33.8 L   Platelets 227 248 231       CHEMISTRIES:  Recent Labs   Lab 11/05/20  1305 12/07/20  1200 12/28/20  0735 01/06/21  0855 05/10/21  0945 09/15/21  0734 02/10/22  1014   Glucose 112 H 102 133 H  --   --   --   --    Sodium 141 142 142  --   --   --   --    Potassium  4.6 5.1 5.4 H 5.2 H  --   --   --    BUN 26 H 21 29 H  --   --   --   --    Creatinine 1.2 1.2 1.3  --  1.3 1.1 1.4   eGFR if African American >60 >60 >60  --  >60 >60.0 57.6 A   eGFR if non  >60 >60 55 A  --  55 A >60.0 49.8 A   Calcium 11.0 H 9.3 9.6  --   --   --   --        CARDIAC BIOMARKERS:        COAGS:  Recent Labs   Lab 04/01/21  0033 04/21/21  0921 05/10/21  0945   INR 2.4 H 2.0 H 2.9 H       LIPIDS/LFTS:  Recent Labs   Lab 05/15/19  0828 08/29/19  0000 08/18/20  0928 02/10/22  1014   Cholesterol 139  --  137 163   Triglycerides 458 H 188 H 125 123   HDL 23 L 27 L 25 L 27 L   LDL Cholesterol Invalid, Trig>400.0 72 87.0 111.4   Non-HDL Cholesterol 116 100 112 136   AST 23  --  18  --    ALT 28  --  18  --        Cardiovascular Testing:  Holter 5/19/21  · Atrial fibrillation. Heart rates varied between 31 and 112 bpm with an average of 57 bpm.  · There were rare PVCs totalling 100 and averaging 4.17 per hour.  · Longest RR interval 3.3 seconds    L MPI 5/19/21    Abnormal myocardial perfusion scan.    There is a mild intensity, fixed defect consistent with scar in the anterior wall.    There is a second moderate intensity, reversible defect that is consistent with ischemia in the inferior wall.    The gated perfusion images showed an ejection fraction of 61% post stress.    The EKG portion of this study is negative for ischemia.    The patient reported no chest pain during the stress test.    There were no arrhythmias during stress.    Echo 5/19/21  · The left ventricle is normal in size with concentric hypertrophy and normal systolic function.  · The estimated ejection fraction is 60%.  · Normal left ventricular diastolic function.  · Normal right ventricular size with normal right ventricular systolic function.  · Mild left atrial enlargement.  · Mild mitral regurgitation.  · Normal central venous pressure (3 mmHg).  · The estimated PA systolic pressure is 13 mmHg.    PCI OM1  12/10/20 (images personally reviewed and interpreted)  1.  90% mid OM2 stenosis.  2.  Successful PCI with placement of a 2.25 x 26 mm drug-eluting stent reducing the 90% stenosis to 0%.  INGRIS 3 flow.  No dissection.  Good angiographic results.    Cath 11/11/20 (images personally reviewed and interpreted)  Left Main   The vessel is angiographically normal.   Left Anterior Descending   Subtotal mid occlusion. Diffusely diseased distal vessel   Left Circumflex   Long mid 80% between OM1 and OM2   First Obtuse Marginal Branch   90% mid   Second Obtuse Marginal Branch   80% mid   Right Coronary Artery   80% mid - moderate disffuse distal disease   LIMA Graft To Mid LAD   Mid to distal graft diffusely diseased 0 multile 80-90% lesions. Small diffusely diseased distal LAD   Saphenous Graft To RPDA   Patent with good runoff to PDA   Graft To 1st Diag   Patent to D1 with good runoff     Ex NUSRAT 4/18/18  Resting NUSRAT:   The right ankle brachial index was 1.04 which is normal.   The left ankle brachial index was 1.08 which is normal.   The right TBI is 0.55.   The left TBI is 0.98.   Exercise NUSRAT:   Post exercise right ankle pressure was 113 mmHg, resulting in a right post-exercise NUSRAT of 0.67.   Post exercise left ankle pressure was 150 mmHg, resulting in a left post-exercise NUSRAT of 0.89.     Carotid US 4/18/18  There is 20 - 39% right Internal Carotid stenosis.   There is 20 - 39% left Internal Carotid stenosis.     ASSESSMENT:   # CAD s/p CABG/PCI OM 12/2020, now with progressive MAYEN/anginal sxs, Class III  # HTN, uncontrolled  # HFpEF, vol overloaded on exam  # Chr AF on eliquis 5mg bid, HR controlled  # HLP on atorva 40mg  # PAD, abnl NUSRAT 4/18/18 (  # carotid atherosclerosis (CUS 4/2018)  # aortic atherosclerosis (CT Chest 10/29/18)    PLAN:   Cont med rx  Cont Plavix 75mg qd  Cont eliquis 5mg bid  Add lasix 20mg bid  Inc Imdur 120mg qd  Check BMP/CBC 1 week  Check echo and carotid US  ER precautions for unstable/resting  sxs  RTC 2 weeks for review.  If sxs persist despite improved vol status/BP control, will consider cath.  Consider statin titration  Consider PAD eval    Complex OV, multiple records reviewed.      Eze Purdy MD, FACC

## 2022-03-23 NOTE — TELEPHONE ENCOUNTER
Care Due:                  Date            Visit Type   Department     Provider  --------------------------------------------------------------------------------                                [facility]                              FOLLOWUP/OF  MED/ INTERNAL  [facility]  Last Visit: 10-      FICE VISIT   MED/ PEDS      Ehrensing                              [facility]                              FOLLOWUP/OF  MED/ INTERNAL  [facility]  Next Visit: 04-      FICE VISIT   MED/ PEDS      Ehrensing                                                            Last  Test          Frequency    Reason                     Performed    Due Date  --------------------------------------------------------------------------------    CBC.........  12 months..  fenofibrate..............  12- 12-    CMP.........  12 months..  atorvastatin,              08- 08-                             fenofibrate, omega-3.....    Powered by [employer]. Reference number: [id number].   3/23/2022 3:42:48 PM CDT

## 2022-03-23 NOTE — TELEPHONE ENCOUNTER
----- Message from Emma Pacheco sent at 3/23/2022  4:11 PM CDT -----  Type: RX Refill Request    Who Called: wife Ada     Have you contacted your pharmacy: no     Refill or New Rx: new rx     RX Name and Strength: furosemide (LASIX) 20 MG tablet    Preferred Pharmacy with phone number:   [id number] - [facility] [address] [facility]  [address] [place] [address]  Phone: [phone] Fax: [phone]    Local or Mail Order: local     Would the patient rather a call back or a response via [facility]? Call back     Best Call Back Number: [phone]    Additional Information: Meds were sent to the wrong pharmacy.

## 2022-03-24 ENCOUNTER — PATIENT MESSAGE (OUTPATIENT)
Dept: CARDIOLOGY | Facility: CLINIC | Age: 72
End: 2022-03-24
Payer: MEDICARE

## 2022-03-24 DIAGNOSIS — R06.02 SHORTNESS OF BREATH: ICD-10-CM

## 2022-03-24 RX ORDER — ALBUTEROL SULFATE 90 UG/1
2 AEROSOL, METERED RESPIRATORY (INHALATION) EVERY 4 HOURS PRN
Qty: 17 G | Refills: 3 | Status: SHIPPED | OUTPATIENT
Start: 2022-03-24

## 2022-03-24 RX ORDER — FUROSEMIDE 20 MG/1
20 TABLET ORAL 2 TIMES DAILY
Qty: 180 TABLET | Refills: 3 | Status: SHIPPED | OUTPATIENT
Start: 2022-03-24 | End: 2022-03-25 | Stop reason: SDUPTHER

## 2022-03-24 NOTE — TELEPHONE ENCOUNTER
----- Message from Lita Hall sent at 3/24/2022 10:20 AM CDT -----  Type: RX Refill Request    Who Called: wife - Ada  Wife called in regards to her 's prescription being sent to the wrong pharmacy. Please advise    Have you contacted your pharmacy: no     Refill or New Rx: refill    RX Name and Strength:albuterol (PROVENTIL/VENTOLIN HFA) 90 mcg/actuation inhaler        Preferred Pharmacy with phone number:[id number] [address] [facility]   Phone:  [phone]  Fax:  [phone]          Local or Mail Order: local    Ordering Provider: Chen Brothers    Would the patient rather a call back or a response via [facility]? Call     Best Call Back Number: [phone]

## 2022-03-24 NOTE — TELEPHONE ENCOUNTER
No new care gaps identified.  Powered by [employer]. Reference number: [id number].   3/24/2022 10:26:09 AM CDT

## 2022-03-25 ENCOUNTER — OFFICE VISIT (OUTPATIENT)
Dept: OPHTHALMOLOGY | Facility: CLINIC | Age: 72
End: 2022-03-25
Payer: MEDICARE

## 2022-03-25 DIAGNOSIS — I25.119 CORONARY ARTERY DISEASE INVOLVING NATIVE CORONARY ARTERY OF NATIVE HEART WITH ANGINA PECTORIS: ICD-10-CM

## 2022-03-25 DIAGNOSIS — H52.7 REFRACTIVE ERROR: ICD-10-CM

## 2022-03-25 DIAGNOSIS — Z98.890 POST-OPERATIVE STATE: Primary | ICD-10-CM

## 2022-03-25 DIAGNOSIS — H40.053 OHT (OCULAR HYPERTENSION), BILATERAL: ICD-10-CM

## 2022-03-25 DIAGNOSIS — Z96.1 PSEUDOPHAKIA: ICD-10-CM

## 2022-03-25 DIAGNOSIS — I10 ESSENTIAL HYPERTENSION: ICD-10-CM

## 2022-03-25 PROCEDURE — 99999 PR PBB SHADOW E&M-EST. PATIENT-LVL IV: ICD-10-PCS | Mod: PBBFAC,,, | Performed by: OPHTHALMOLOGY

## 2022-03-25 PROCEDURE — 3051F PR MOST RECENT HEMOGLOBIN A1C LEVEL 7.0 - < 8.0%: ICD-10-PCS | Mod: CPTII,S$GLB,, | Performed by: OPHTHALMOLOGY

## 2022-03-25 PROCEDURE — 3288F PR FALLS RISK ASSESSMENT DOCUMENTED: ICD-10-PCS | Mod: CPTII,S$GLB,, | Performed by: OPHTHALMOLOGY

## 2022-03-25 PROCEDURE — 99999 PR PBB SHADOW E&M-EST. PATIENT-LVL IV: CPT | Mod: PBBFAC,,, | Performed by: OPHTHALMOLOGY

## 2022-03-25 PROCEDURE — 3051F HG A1C>EQUAL 7.0%<8.0%: CPT | Mod: CPTII,S$GLB,, | Performed by: OPHTHALMOLOGY

## 2022-03-25 PROCEDURE — 99024 POSTOP FOLLOW-UP VISIT: CPT | Mod: S$GLB,,, | Performed by: OPHTHALMOLOGY

## 2022-03-25 PROCEDURE — 1160F RVW MEDS BY RX/DR IN RCRD: CPT | Mod: CPTII,S$GLB,, | Performed by: OPHTHALMOLOGY

## 2022-03-25 PROCEDURE — 3060F PR POS MICROALBUMINURIA RESULT DOCUMENTED/REVIEW: ICD-10-PCS | Mod: CPTII,S$GLB,, | Performed by: OPHTHALMOLOGY

## 2022-03-25 PROCEDURE — 1159F PR MEDICATION LIST DOCUMENTED IN MEDICAL RECORD: ICD-10-PCS | Mod: CPTII,S$GLB,, | Performed by: OPHTHALMOLOGY

## 2022-03-25 PROCEDURE — 3066F NEPHROPATHY DOC TX: CPT | Mod: CPTII,S$GLB,, | Performed by: OPHTHALMOLOGY

## 2022-03-25 PROCEDURE — 1126F AMNT PAIN NOTED NONE PRSNT: CPT | Mod: CPTII,S$GLB,, | Performed by: OPHTHALMOLOGY

## 2022-03-25 PROCEDURE — 1101F PT FALLS ASSESS-DOCD LE1/YR: CPT | Mod: CPTII,S$GLB,, | Performed by: OPHTHALMOLOGY

## 2022-03-25 PROCEDURE — 3288F FALL RISK ASSESSMENT DOCD: CPT | Mod: CPTII,S$GLB,, | Performed by: OPHTHALMOLOGY

## 2022-03-25 PROCEDURE — 1160F PR REVIEW ALL MEDS BY PRESCRIBER/CLIN PHARMACIST DOCUMENTED: ICD-10-PCS | Mod: CPTII,S$GLB,, | Performed by: OPHTHALMOLOGY

## 2022-03-25 PROCEDURE — 1159F MED LIST DOCD IN RCRD: CPT | Mod: CPTII,S$GLB,, | Performed by: OPHTHALMOLOGY

## 2022-03-25 PROCEDURE — 3060F POS MICROALBUMINURIA REV: CPT | Mod: CPTII,S$GLB,, | Performed by: OPHTHALMOLOGY

## 2022-03-25 PROCEDURE — 99024 PR POST-OP FOLLOW-UP VISIT: ICD-10-PCS | Mod: S$GLB,,, | Performed by: OPHTHALMOLOGY

## 2022-03-25 PROCEDURE — 1126F PR PAIN SEVERITY QUANTIFIED, NO PAIN PRESENT: ICD-10-PCS | Mod: CPTII,S$GLB,, | Performed by: OPHTHALMOLOGY

## 2022-03-25 PROCEDURE — 1101F PR PT FALLS ASSESS DOC 0-1 FALLS W/OUT INJ PAST YR: ICD-10-PCS | Mod: CPTII,S$GLB,, | Performed by: OPHTHALMOLOGY

## 2022-03-25 PROCEDURE — 3066F PR DOCUMENTATION OF TREATMENT FOR NEPHROPATHY: ICD-10-PCS | Mod: CPTII,S$GLB,, | Performed by: OPHTHALMOLOGY

## 2022-03-25 RX ORDER — ISOSORBIDE MONONITRATE 120 MG/1
120 TABLET, EXTENDED RELEASE ORAL DAILY
Qty: 90 TABLET | Refills: 3 | Status: SHIPPED | OUTPATIENT
Start: 2022-03-25 | End: 2022-04-06 | Stop reason: SDUPTHER

## 2022-03-25 RX ORDER — FUROSEMIDE 20 MG/1
20 TABLET ORAL 2 TIMES DAILY
Qty: 180 TABLET | Refills: 3 | Status: SHIPPED | OUTPATIENT
Start: 2022-03-25 | End: 2022-04-06 | Stop reason: SDUPTHER

## 2022-03-25 NOTE — TELEPHONE ENCOUNTER
"Pt sent message:  "I called the office about this prescription yesterday evening. It was sent to [facility] instead of [facility] on [address]. I thought I was supposed to start taking it now. The girl I spoke to said she changed it to [facility] but [facility] doesnt have it. Can you send a 30 day prescription to [facility], please?  [facility] has already processed the order. Thank you."  "

## 2022-03-25 NOTE — TELEPHONE ENCOUNTER
----- Message from Adria Cortés sent at 3/25/2022  9:10 AM CDT -----  Regarding: Medication  Contact: Ada (spouse)  Name of Who is Calling: Ada (spouse)      What is the request in detail:  1) furosemide (LASIX) 20 MG tablet needs to go to [id number] - [place] [address] [facility] instead of [facility].  States she told [facility] to cancel since patient needs to start taking it now. Please advise    2) Requesting a weeks worth of isosorbide mononitrate (IMDUR) 120 MG 24 hr tablet to go to [id number] - [place] [place] - [address] [facility]  due to prescription already shipped but will not be here in time.       Can the clinic reply by [facility]: yes      What Number to Call Back [facility] not [facility]: [phone]

## 2022-03-25 NOTE — PROGRESS NOTES
Subjective:       Patient ID: Driss Hernandez Jr. is a 71 y.o. male.    Chief Complaint: Post-op Evaluation    HPI     S/p Phaco w/IOL OS- 2/24/2022    70 y/o male is here for 3 week post-op of the LT eye. Pt reports vision is   doing well. Denies pain and discomfort.     Eyemeds  No gtts    Last edited by Flex Cantu on 3/25/2022  8:07 AM. (History)             Assessment:       1. Post-operative state    2. OHT (ocular hypertension), bilateral    3. Refractive error    4. Pseudophakia        Plan:       S/p CE OS- Doing well.     OHT-IOP is acceptable OS today.  RE-Doing well with readers.      RTC 6 mos.

## 2022-03-28 RX ORDER — NITROGLYCERIN 0.4 MG/1
0.4 TABLET SUBLINGUAL EVERY 5 MIN PRN
Qty: 25 TABLET | Refills: 11 | Status: SHIPPED | OUTPATIENT
Start: 2022-03-28 | End: 2023-06-16 | Stop reason: SDUPTHER

## 2022-03-29 ENCOUNTER — CLINICAL SUPPORT (OUTPATIENT)
Dept: REHABILITATION | Facility: HOSPITAL | Age: 72
End: 2022-03-29
Attending: ORTHOPAEDIC SURGERY
Payer: MEDICARE

## 2022-03-29 DIAGNOSIS — Z78.9 IMPAIRED MOBILITY AND ADLS: ICD-10-CM

## 2022-03-29 DIAGNOSIS — R29.898 WEAKNESS OF SHOULDER: Primary | ICD-10-CM

## 2022-03-29 DIAGNOSIS — Z74.09 IMPAIRED MOBILITY AND ADLS: ICD-10-CM

## 2022-03-29 DIAGNOSIS — M95.8 WINGING OF SCAPULA: ICD-10-CM

## 2022-03-29 PROCEDURE — 97140 MANUAL THERAPY 1/> REGIONS: CPT | Mod: PN

## 2022-03-29 PROCEDURE — 97110 THERAPEUTIC EXERCISES: CPT | Mod: PN

## 2022-03-29 NOTE — PROGRESS NOTES
"  Physical Therapy Daily Treatment Note     Name: Driss Hernandez Jr.  Clinic Number: [id number]    Therapy Diagnosis:   Encounter Diagnoses   Name Primary?    Weakness of shoulder Yes    Winging of scapula     Impaired mobility and ADLs      Physician: Valerie Olivera MD  Visit Date: 3/29/2022    Physician Orders: PT Eval and Treat   Medical Diagnosis from Referral: Chronic right shoulder pain  Evaluation Date: 2/17/2022  Authorization Period Expiration: 1/10/23  Plan of Care Expiration: 4/10/22  Visit # / Visits authorized: 5/20 (+ eval)    Time In: 1000  Time Out: 1055  Total Billable Time: 55 minutes    Precautions: Fall    Subjective     Pt reports: Pt will have possible shoulder surgery in future. He has been consulted for cardiovascular issues and recent SOB, enlarged heart. He continues to have GH joint line pain at night, but also cervical/upper trap symptoms. No long term improvement since beginning PT that he can report   He was compliant with home exercise program.  Response to previous treatment: no adverse response  Functional change: none to report yet    Pain: 5/10  Location: right shoulder      Objective     Cervical ROM Screen: Full ROM, pain-free     Shoulder Range of Motion:   ACTIVE ROM LEFT RIGHT   Flexion 150 130 AROM  150 PROM   Abduction 140 130 AROM  150 PROM   IR Functional T12 Functional L3   ER Functional T2 Functional T2        STRENGTH LEFT RIGHT   Flexion 5/5 4-/5   Abduction 4/5 4/5    IR (neutral) 4+/5 4/5   ER (neutral) 4-/5 3+/5 p       Driss received therapeutic exercises to develop strength, endurance, ROM, flexibility, posture and core stabilization for 30 minutes including: bolded=performed    UBE 3'/3'  R Upper Trap Str 3x30"  LS stretch 3x30s   SL ER 2# 3x10  SL Shld Abd 2# 3x10  Prone Row 5# 3x10  +Wall Slide Flex/Abd 2x10 ea    Standing Rows RTB 3x10   Shld IR/ER RTB 3x10 ea   Standing Horiz Abd RTB 20x   Eccentric Bicep Curl +5# 3x10 (against wall for TC)   Supine " Chin Tuck +3x10   scalene stretch 2x30s   Supine Serratus Punch 5# 3x10   +serratus wall slides 2x10  Pulleys Scap/Abd 2'/2'      Driss received the following manual therapy techniques: Joint mobilizations and Soft tissue Mobilization were applied to the: R Shoulder for 25 minutes, including:  Cervical Distraction + Suboccipital Release  STM to R UT, LS, scalenes   R 1st rib mobs    Dry Needling  Pt cleared of contraindications and verbal and written consent acquired. Pt given option of copy of consent form. Pt educated on benefits and potential side effects of DN. DN performed for 10 minutes with Pt in supine position with involved side R Upper Trap. DN performed at Upper Trap trigger point with fanning and winding.     Patient provided written and verbal consent to receive functional dry needling at today's visit (see consent form scanned into chart). FDN performed to reduce pain and muscle tension, promote blood flow, and improve ROM and function x 10 minutes. Pt tolerated tx well without adverse effects. Pt was educated on what to expect following the procedure and Pt verbalized understanding.      Home Exercises Provided and Patient Education Provided     Education provided:   Written Home Exercises Provided: yes.  Exercises were reviewed and Driss was able to demonstrate them prior to the end of the session.  Driss demonstrated good  understanding of the education provided.     See EMR under Patient Instructions for exercises provided prior visit.    Assessment       Pt tolerated treatment session well with reassessment performed. Pt with no improvement in GHJ AROM/PROM since beginning of episode of care; slight worsening of symptoms due to pain/weakness. Poor GHJ ER and elevation strength. Progress during episode of care impaired recently due to SOB and cardiovascular pathologies. Pt reports that possible surgical procedure/arthroscopy is being considered. Dry needling performed to R UT with good relief of  tightness and pain. Prepare for discharge in future session with possible surgical intervention to R shoulder.    Driss Is progressing well towards his goals.   Pt prognosis is Fair.     Pt will continue to benefit from skilled outpatient physical therapy to address the deficits listed in the problem list box on initial evaluation, provide pt/family education and to maximize pt's level of independence in the home and community environment.     Pt's spiritual, cultural and educational needs considered and pt agreeable to plan of care and goals.     Anticipated barriers to physical therapy: COVID-19 Concerns    Goals:   Short Term GOALS: 4 weeks. Pt agrees with goals set.  1. Patient demonstrates independence with HEP.   2. Patient demonstrates independence with Postural Awareness.   3. Patient demonstrates independence with body mechanics.   4. Patient will report pain of 5/10 at worst, on 0-10 pain scale, with all activity  5. Pt will increase R GHJ AROM by 10 degrees in elevation plane to improve functional reach pain free.   6. Pt will increase UE strength by 1/3 muscle grade or greater on MMT to improve tolerance to all functional activities pain free.     Long Term Goals 8 Weeks. Pt agrees with goals set:  1. Pt will increase R GHJ ROM symmetrical to L GHJ to improve functional reach pain free.   2. Pt will increase UE strength to 4/5 to improve tolerance to all functional activities pain free.   3. Pt demonstrates improved function per FOTO Shoulder Survey to 50% Limitation or less.   4. Patient will report pain of 2/10 at worst, on 0-10 pain scale, with all activity  5. Patient demonstrates ability to lift 10# object to overhead shelf with R UE, proper movement pattern, no pain provocation    Plan     Plan of care Certification: 2/17/2022 to 4/10/22.    Cont PT POC and progress strengthening exercises as tolerated. Cont MT for cervical/R shoulder    Akhil Guajardo, PT

## 2022-03-30 ENCOUNTER — HOSPITAL ENCOUNTER (OUTPATIENT)
Dept: CARDIOLOGY | Facility: HOSPITAL | Age: 72
Discharge: HOME | End: 2022-03-30
Attending: INTERNAL MEDICINE
Payer: MEDICARE

## 2022-03-30 DIAGNOSIS — I65.23 ATHEROSCLEROSIS OF BOTH CAROTID ARTERIES: ICD-10-CM

## 2022-03-30 DIAGNOSIS — I25.119 CORONARY ARTERY DISEASE INVOLVING NATIVE CORONARY ARTERY OF NATIVE HEART WITH ANGINA PECTORIS: ICD-10-CM

## 2022-03-30 DIAGNOSIS — I10 ESSENTIAL HYPERTENSION: ICD-10-CM

## 2022-03-30 LAB
AORTIC ROOT ANNULUS: 3.1 CM
AORTIC VALVE CUSP SEPERATION: 1.81 CM
ASCENDING AORTA: 3.35 CM
AV INDEX (PROSTH): 0.74
AV MEAN GRADIENT: 5 MMHG
AV PEAK GRADIENT: 8 MMHG
AV VALVE AREA: 2.59 CM2
AV VELOCITY RATIO: 0.72
CV ECHO LV RWT: 0.6 CM
DOP CALC AO PEAK VEL: 1.44 M/S
DOP CALC AO VTI: 30.86 CM
DOP CALC LVOT AREA: 3.5 CM2
DOP CALC LVOT DIAMETER: 2.11 CM
DOP CALC LVOT PEAK VEL: 1.03 M/S
DOP CALC LVOT STROKE VOLUME: 79.79 CM3
DOP CALCLVOT PEAK VEL VTI: 22.83 CM
E WAVE DECELERATION TIME: 225.32 MSEC
E/A RATIO: 4.46
E/E' RATIO: 17.86 M/S
ECHO LV POSTERIOR WALL: 1.34 CM (ref 0.6–1.1)
EJECTION FRACTION: 60 %
FRACTIONAL SHORTENING: 35 % (ref 28–44)
INTERVENTRICULAR SEPTUM: 1.3 CM (ref 0.6–1.1)
IVRT: 141.87 MSEC
LA MAJOR: 6.4 CM
LA MINOR: 6.68 CM
LA WIDTH: 4.15 CM
LEFT ATRIUM SIZE: 4.78 CM
LEFT ATRIUM VOLUME: 110.22 CM3
LEFT CBA DIAS: 14 CM/S
LEFT CBA SYS: 125 CM/S
LEFT CCA DIST DIAS: 13 CM/S
LEFT CCA DIST SYS: 78 CM/S
LEFT CCA MID DIAS: 10 CM/S
LEFT CCA MID SYS: 88 CM/S
LEFT CCA PROX DIAS: 11 CM/S
LEFT CCA PROX SYS: 101 CM/S
LEFT ECA DIAS: 22 CM/S
LEFT ECA SYS: 284 CM/S
LEFT ICA DIST DIAS: 2 CM/S
LEFT ICA DIST SYS: 81 CM/S
LEFT ICA MID DIAS: 31 CM/S
LEFT ICA MID SYS: 265 CM/S
LEFT ICA PROX DIAS: 45 CM/S
LEFT ICA PROX SYS: 261 CM/S
LEFT INTERNAL DIMENSION IN SYSTOLE: 2.9 CM (ref 2.1–4)
LEFT VENTRICLE DIASTOLIC VOLUME: 90.72 ML
LEFT VENTRICLE SYSTOLIC VOLUME: 32.18 ML
LEFT VENTRICULAR INTERNAL DIMENSION IN DIASTOLE: 4.46 CM (ref 3.5–6)
LEFT VENTRICULAR MASS: 224.57 G
LEFT VERTEBRAL DIAS: 21 CM/S
LEFT VERTEBRAL SYS: 104 CM/S
LV LATERAL E/E' RATIO: 13.89 M/S
LV SEPTAL E/E' RATIO: 25 M/S
MV PEAK A VEL: 0.28 M/S
MV PEAK E VEL: 1.25 M/S
OHS CV CAROTID RIGHT ICA EDV HIGHEST: 32
OHS CV CAROTID ULTRASOUND LEFT ICA/CCA RATIO: 3.4
OHS CV CAROTID ULTRASOUND RIGHT ICA/CCA RATIO: 1.98
OHS CV PV CAROTID LEFT HIGHEST CCA: 101
OHS CV PV CAROTID LEFT HIGHEST ICA: 265
OHS CV PV CAROTID RIGHT HIGHEST CCA: 94
OHS CV PV CAROTID RIGHT HIGHEST ICA: 172
OHS CV US CAROTID LEFT HIGHEST EDV: 45
PISA TR MAX VEL: 1.33 M/S
PV PEAK VELOCITY: 1.14 CM/S
RA MAJOR: 5.68 CM
RA PRESSURE: 15 MMHG
RA WIDTH: 3.61 CM
RIGHT CBA DIAS: 20 CM/S
RIGHT CBA SYS: 117 CM/S
RIGHT CCA DIST DIAS: 14 CM/S
RIGHT CCA DIST SYS: 87 CM/S
RIGHT CCA MID DIAS: 11 CM/S
RIGHT CCA MID SYS: 83 CM/S
RIGHT CCA PROX DIAS: 6 CM/S
RIGHT CCA PROX SYS: 94 CM/S
RIGHT ECA DIAS: 11 CM/S
RIGHT ECA SYS: 470 CM/S
RIGHT ICA DIST DIAS: 23 CM/S
RIGHT ICA DIST SYS: 76 CM/S
RIGHT ICA MID DIAS: 15 CM/S
RIGHT ICA MID SYS: 111 CM/S
RIGHT ICA PROX DIAS: 32 CM/S
RIGHT ICA PROX SYS: 172 CM/S
RIGHT VENTRICULAR END-DIASTOLIC DIMENSION: 4.25 CM
RIGHT VERTEBRAL DIAS: 11 CM/S
RIGHT VERTEBRAL SYS: 108 CM/S
RV TISSUE DOPPLER FREE WALL SYSTOLIC VELOCITY 1 (APICAL 4 CHAMBER VIEW): 9.26 CM/S
SINUS: 3.08 CM
STJ: 2.18 CM
TDI LATERAL: 0.09 M/S
TDI SEPTAL: 0.05 M/S
TDI: 0.07 M/S
TR MAX PG: 7 MMHG
TRICUSPID ANNULAR PLANE SYSTOLIC EXCURSION: 1.44 CM
TV REST PULMONARY ARTERY PRESSURE: 22 MMHG

## 2022-03-30 PROCEDURE — 93880 EXTRACRANIAL BILAT STUDY: CPT

## 2022-03-30 PROCEDURE — 93306 TTE W/DOPPLER COMPLETE: CPT | Mod: 26,,, | Performed by: INTERNAL MEDICINE

## 2022-03-30 PROCEDURE — 93880 CV US DOPPLER CAROTID (CUPID ONLY): ICD-10-PCS | Mod: 26,,, | Performed by: INTERNAL MEDICINE

## 2022-03-30 PROCEDURE — 93880 EXTRACRANIAL BILAT STUDY: CPT | Mod: 26,,, | Performed by: INTERNAL MEDICINE

## 2022-03-30 PROCEDURE — 93306 TTE W/DOPPLER COMPLETE: CPT

## 2022-03-30 PROCEDURE — 93306 ECHO (CUPID ONLY): ICD-10-PCS | Mod: 26,,, | Performed by: INTERNAL MEDICINE

## 2022-03-31 ENCOUNTER — CLINICAL SUPPORT (OUTPATIENT)
Dept: REHABILITATION | Facility: HOSPITAL | Age: 72
End: 2022-03-31
Attending: ORTHOPAEDIC SURGERY
Payer: MEDICARE

## 2022-03-31 DIAGNOSIS — M95.8 WINGING OF SCAPULA: ICD-10-CM

## 2022-03-31 DIAGNOSIS — Z78.9 IMPAIRED MOBILITY AND ADLS: ICD-10-CM

## 2022-03-31 DIAGNOSIS — Z74.09 IMPAIRED MOBILITY AND ADLS: ICD-10-CM

## 2022-03-31 DIAGNOSIS — R29.898 WEAKNESS OF SHOULDER: Primary | ICD-10-CM

## 2022-03-31 PROCEDURE — 97110 THERAPEUTIC EXERCISES: CPT | Mod: PN

## 2022-03-31 PROCEDURE — 97140 MANUAL THERAPY 1/> REGIONS: CPT | Mod: PN

## 2022-03-31 NOTE — PROGRESS NOTES
"  Physical Therapy Discharge Note     Name: Driss Hernandez Jr.  Clinic Number: [id number]    Therapy Diagnosis:   Encounter Diagnoses   Name Primary?    Weakness of shoulder Yes    Winging of scapula     Impaired mobility and ADLs      Physician: Valerie Olivera MD  Visit Date: 3/31/2022    Physician Orders: PT Eval and Treat   Medical Diagnosis from Referral: Chronic right shoulder pain  Evaluation Date: 2/17/2022  Authorization Period Expiration: 1/10/23  Plan of Care Expiration: 4/10/22  Visit # / Visits authorized: 6/20 (+ eval)    Time In: 900  Time Out: 1000  Total Billable Time: 60 minutes    Precautions: Fall    Subjective     Pt reports: He was sore in R UT after last session, likely related to dry needling. He noticed increased pain in R GHJ initially during activity, however significantly reduced pain last night. Night time is usually when he feels the most pain. He is hopeful to have surgical procedure to R shoulder in future.  He was compliant with home exercise program.  Response to previous treatment: no adverse response  Functional change: none to report yet    Pain: 2/10  Location: right shoulder      Objective     Cervical ROM Screen: Full ROM, pain-free     Shoulder Range of Motion:   ACTIVE ROM LEFT RIGHT   Flexion 150 130 AROM  150 PROM   Abduction 140 130 AROM  150 PROM   IR Functional T12 Functional L3   ER Functional T2 Functional T2        STRENGTH LEFT RIGHT   Flexion 5/5 4-/5   Abduction 4/5 4/5    IR (neutral) 4+/5 4/5   ER (neutral) 4-/5 3+/5 p     Driss received therapeutic exercises to develop strength, endurance, ROM, flexibility, posture and core stabilization for 50 minutes including: bolded=performed    UBE 3'/3'  R Upper Trap Str 3x30"  R LS stretch 3x30s     +Seated Row Matrix 25# 3x10  SL ER 2# 3x10  Prone Row 5# 3x10  Wall Slide Flex/Abd 2x10 ea  Sup Wand Flex (5#) 3x10  Sup Serratus Punch (5#) 3x10  Supine Chin Tuck +3x10       Driss received the following manual " therapy techniques: Joint mobilizations and Soft tissue Mobilization were applied to the: R Shoulder for 10 minutes, including:  Cervical Distraction + Suboccipital Release    Home Exercises Provided and Patient Education Provided     Education provided:   Written Home Exercises Provided: yes.  Exercises were reviewed and Driss was able to demonstrate them prior to the end of the session.  Driss demonstrated good  understanding of the education provided.     See EMR under Patient Instructions for exercises provided prior visit.    Assessment       Pt tolerated treatment session well with discharge note performed. Pt with mild symptom relief in R UT following dry needling last session, however GH joint pain has been relatively unchanged since beginning of episode of care. Pt continues to demonstrate Poor GHJ ER and elevation strength. Progress during episode of care impaired recently due to SOB and cardiovascular pathologies. Pt reports that possible surgical procedure/arthroscopy is being considered. Pt has multiple medical complexities at this time, and is prepared to discharge with likely return following surgical intervention if needed.    Driss Is progressing well towards his goals.   Pt prognosis is Fair.     Pt's spiritual, cultural and educational needs considered and pt agreeable to plan of care and goals.     Anticipated barriers to physical therapy: COVID-19 Concerns    Goals:   Short Term GOALS: 4 weeks. Pt agrees with goals set.  1. Patient demonstrates independence with HEP. met  2. Patient demonstrates independence with Postural Awareness. met  3. Patient demonstrates independence with body mechanics.  met  4. Patient will report pain of 5/10 at worst, on 0-10 pain scale, with all activity not met  5. Pt will increase R GHJ AROM by 10 degrees in elevation plane to improve functional reach pain free. not met  6. Pt will increase UE strength by 1/3 muscle grade or greater on MMT to improve tolerance to all  functional activities pain free. not met    Long Term Goals 8 Weeks. Pt agrees with goals set:  1. Pt will increase R GHJ ROM symmetrical to L GHJ to improve functional reach pain free.  not met  2. Pt will increase UE strength to 4/5 to improve tolerance to all functional activities pain free. not met  3. Pt demonstrates improved function per FOTO Shoulder Survey to 50% Limitation or less. not met  4. Patient will report pain of 2/10 at worst, on 0-10 pain scale, with all activity  not met  5. Patient demonstrates ability to lift 10# object to overhead shelf with R UE, proper movement pattern, no pain provocation  not met    Plan     Plan of care Certification: 2/17/2022 to 4/10/22.    Pt is discharged from out-patient PT, Pt will contact MD/PT for any significant decline in functional status    Akhil Guajrado, PT

## 2022-04-06 ENCOUNTER — OFFICE VISIT (OUTPATIENT)
Dept: CARDIOLOGY | Facility: CLINIC | Age: 72
End: 2022-04-06
Payer: MEDICARE

## 2022-04-06 VITALS
HEART RATE: 62 BPM | SYSTOLIC BLOOD PRESSURE: 146 MMHG | OXYGEN SATURATION: 95 % | RESPIRATION RATE: 15 BRPM | BODY MASS INDEX: 30.74 KG/M2 | HEIGHT: 67 IN | DIASTOLIC BLOOD PRESSURE: 70 MMHG | WEIGHT: 195.88 LBS

## 2022-04-06 DIAGNOSIS — E08.22 DIABETES MELLITUS DUE TO UNDERLYING CONDITION WITH STAGE 3A CHRONIC KIDNEY DISEASE, WITH LONG-TERM CURRENT USE OF INSULIN: ICD-10-CM

## 2022-04-06 DIAGNOSIS — Z79.01 LONG TERM (CURRENT) USE OF ANTICOAGULANTS: ICD-10-CM

## 2022-04-06 DIAGNOSIS — E78.2 MIXED HYPERLIPIDEMIA: ICD-10-CM

## 2022-04-06 DIAGNOSIS — Z79.4 DIABETES MELLITUS DUE TO UNDERLYING CONDITION WITH STAGE 3A CHRONIC KIDNEY DISEASE, WITH LONG-TERM CURRENT USE OF INSULIN: ICD-10-CM

## 2022-04-06 DIAGNOSIS — I70.0 AORTIC ATHEROSCLEROSIS: ICD-10-CM

## 2022-04-06 DIAGNOSIS — I10 ESSENTIAL HYPERTENSION: ICD-10-CM

## 2022-04-06 DIAGNOSIS — R60.0 BILATERAL LOWER EXTREMITY EDEMA: ICD-10-CM

## 2022-04-06 DIAGNOSIS — I65.23 ATHEROSCLEROSIS OF BOTH CAROTID ARTERIES: ICD-10-CM

## 2022-04-06 DIAGNOSIS — I50.31 ACUTE DIASTOLIC CONGESTIVE HEART FAILURE: Primary | ICD-10-CM

## 2022-04-06 DIAGNOSIS — Z95.1 HX OF CABG: ICD-10-CM

## 2022-04-06 DIAGNOSIS — N18.31 DIABETES MELLITUS DUE TO UNDERLYING CONDITION WITH STAGE 3A CHRONIC KIDNEY DISEASE, WITH LONG-TERM CURRENT USE OF INSULIN: ICD-10-CM

## 2022-04-06 DIAGNOSIS — I48.11 LONGSTANDING PERSISTENT ATRIAL FIBRILLATION: ICD-10-CM

## 2022-04-06 DIAGNOSIS — I25.119 CORONARY ARTERY DISEASE INVOLVING NATIVE CORONARY ARTERY OF NATIVE HEART WITH ANGINA PECTORIS: ICD-10-CM

## 2022-04-06 DIAGNOSIS — E66.9 OBESITY (BMI 30.0-34.9): ICD-10-CM

## 2022-04-06 PROCEDURE — 1159F MED LIST DOCD IN RCRD: CPT | Mod: CPTII,S$GLB,, | Performed by: INTERNAL MEDICINE

## 2022-04-06 PROCEDURE — 1159F PR MEDICATION LIST DOCUMENTED IN MEDICAL RECORD: ICD-10-PCS | Mod: CPTII,S$GLB,, | Performed by: INTERNAL MEDICINE

## 2022-04-06 PROCEDURE — 1160F RVW MEDS BY RX/DR IN RCRD: CPT | Mod: CPTII,S$GLB,, | Performed by: INTERNAL MEDICINE

## 2022-04-06 PROCEDURE — 99999 PR PBB SHADOW E&M-EST. PATIENT-LVL V: ICD-10-PCS | Mod: PBBFAC,,, | Performed by: INTERNAL MEDICINE

## 2022-04-06 PROCEDURE — 3077F PR MOST RECENT SYSTOLIC BLOOD PRESSURE >= 140 MM HG: ICD-10-PCS | Mod: CPTII,S$GLB,, | Performed by: INTERNAL MEDICINE

## 2022-04-06 PROCEDURE — 3288F PR FALLS RISK ASSESSMENT DOCUMENTED: ICD-10-PCS | Mod: CPTII,S$GLB,, | Performed by: INTERNAL MEDICINE

## 2022-04-06 PROCEDURE — 99214 OFFICE O/P EST MOD 30 MIN: CPT | Mod: S$GLB,,, | Performed by: INTERNAL MEDICINE

## 2022-04-06 PROCEDURE — 3078F PR MOST RECENT DIASTOLIC BLOOD PRESSURE < 80 MM HG: ICD-10-PCS | Mod: CPTII,S$GLB,, | Performed by: INTERNAL MEDICINE

## 2022-04-06 PROCEDURE — 3008F BODY MASS INDEX DOCD: CPT | Mod: CPTII,S$GLB,, | Performed by: INTERNAL MEDICINE

## 2022-04-06 PROCEDURE — 3072F LOW RISK FOR RETINOPATHY: CPT | Mod: CPTII,S$GLB,, | Performed by: INTERNAL MEDICINE

## 2022-04-06 PROCEDURE — 1101F PT FALLS ASSESS-DOCD LE1/YR: CPT | Mod: CPTII,S$GLB,, | Performed by: INTERNAL MEDICINE

## 2022-04-06 PROCEDURE — 99214 PR OFFICE/OUTPT VISIT, EST, LEVL IV, 30-39 MIN: ICD-10-PCS | Mod: S$GLB,,, | Performed by: INTERNAL MEDICINE

## 2022-04-06 PROCEDURE — 1126F AMNT PAIN NOTED NONE PRSNT: CPT | Mod: CPTII,S$GLB,, | Performed by: INTERNAL MEDICINE

## 2022-04-06 PROCEDURE — 99999 PR PBB SHADOW E&M-EST. PATIENT-LVL V: CPT | Mod: PBBFAC,,, | Performed by: INTERNAL MEDICINE

## 2022-04-06 PROCEDURE — 1160F PR REVIEW ALL MEDS BY PRESCRIBER/CLIN PHARMACIST DOCUMENTED: ICD-10-PCS | Mod: CPTII,S$GLB,, | Performed by: INTERNAL MEDICINE

## 2022-04-06 PROCEDURE — 3051F HG A1C>EQUAL 7.0%<8.0%: CPT | Mod: CPTII,S$GLB,, | Performed by: INTERNAL MEDICINE

## 2022-04-06 PROCEDURE — 3066F PR DOCUMENTATION OF TREATMENT FOR NEPHROPATHY: ICD-10-PCS | Mod: CPTII,S$GLB,, | Performed by: INTERNAL MEDICINE

## 2022-04-06 PROCEDURE — 3077F SYST BP >= 140 MM HG: CPT | Mod: CPTII,S$GLB,, | Performed by: INTERNAL MEDICINE

## 2022-04-06 PROCEDURE — 3066F NEPHROPATHY DOC TX: CPT | Mod: CPTII,S$GLB,, | Performed by: INTERNAL MEDICINE

## 2022-04-06 PROCEDURE — 3072F PR LOW RISK FOR RETINOPATHY: ICD-10-PCS | Mod: CPTII,S$GLB,, | Performed by: INTERNAL MEDICINE

## 2022-04-06 PROCEDURE — 3060F PR POS MICROALBUMINURIA RESULT DOCUMENTED/REVIEW: ICD-10-PCS | Mod: CPTII,S$GLB,, | Performed by: INTERNAL MEDICINE

## 2022-04-06 PROCEDURE — 3060F POS MICROALBUMINURIA REV: CPT | Mod: CPTII,S$GLB,, | Performed by: INTERNAL MEDICINE

## 2022-04-06 PROCEDURE — 3008F PR BODY MASS INDEX (BMI) DOCUMENTED: ICD-10-PCS | Mod: CPTII,S$GLB,, | Performed by: INTERNAL MEDICINE

## 2022-04-06 PROCEDURE — 1101F PR PT FALLS ASSESS DOC 0-1 FALLS W/OUT INJ PAST YR: ICD-10-PCS | Mod: CPTII,S$GLB,, | Performed by: INTERNAL MEDICINE

## 2022-04-06 PROCEDURE — 3288F FALL RISK ASSESSMENT DOCD: CPT | Mod: CPTII,S$GLB,, | Performed by: INTERNAL MEDICINE

## 2022-04-06 PROCEDURE — 3078F DIAST BP <80 MM HG: CPT | Mod: CPTII,S$GLB,, | Performed by: INTERNAL MEDICINE

## 2022-04-06 PROCEDURE — 1126F PR PAIN SEVERITY QUANTIFIED, NO PAIN PRESENT: ICD-10-PCS | Mod: CPTII,S$GLB,, | Performed by: INTERNAL MEDICINE

## 2022-04-06 PROCEDURE — 3051F PR MOST RECENT HEMOGLOBIN A1C LEVEL 7.0 - < 8.0%: ICD-10-PCS | Mod: CPTII,S$GLB,, | Performed by: INTERNAL MEDICINE

## 2022-04-06 RX ORDER — FUROSEMIDE 40 MG/1
40 TABLET ORAL 2 TIMES DAILY
Qty: 180 TABLET | Refills: 3 | Status: SHIPPED | OUTPATIENT
Start: 2022-04-06 | End: 2022-04-21 | Stop reason: SDUPTHER

## 2022-04-06 RX ORDER — ATORVASTATIN CALCIUM 80 MG/1
80 TABLET, FILM COATED ORAL NIGHTLY
Qty: 90 TABLET | Refills: 3 | Status: SHIPPED | OUTPATIENT
Start: 2022-04-06 | End: 2023-05-09

## 2022-04-06 RX ORDER — ISOSORBIDE MONONITRATE 120 MG/1
240 TABLET, EXTENDED RELEASE ORAL DAILY
Qty: 180 TABLET | Refills: 3 | Status: SHIPPED | OUTPATIENT
Start: 2022-04-06 | End: 2022-05-30 | Stop reason: SDUPTHER

## 2022-04-06 NOTE — PROGRESS NOTES
CARDIOVASCULAR PROGRESS NOTE    REASON FOR CONSULT:   Driss Hernandez Jr. is a 71 y.o. male who presents for follow up of CAD, HFpEF.    PCP: Ehrensing  HISTORY OF PRESENT ILLNESS:   The patient returns for follow-up.  He tells me he is still having shortness of breath and had a single episode of nitroglycerin responsive chest discomfort.  Both of these are improved versus his prior office visit.  He is having no symptoms at rest.  He denies any palpitations or syncope.  There has been improvement in his cough as well as his orthopnea.  He still has left greater than right lower extremity edema.  He denies melena, hematuria, or claudicant symptoms.  He continues take his Plavix and Eliquis anticoagulation.    I reviewed the results of his echocardiogram noting normal LV function.  His carotid ultrasound suggests progression of his bilateral carotid atherosclerosis.    CARDIOVASCULAR HISTORY:   CAD 2016 CABG x3 (LIMA-LAD, SVG-D, SVG-PDA)   11/11/20 cath with diffuse native disease, patent SVG x2, LIMA-LAD atretic   12/10/20: PCI OM2 2.25x26 Resolute Isiah RICO    HFpEF    PAD    Chr AF on eliquis 5mg bid    PAST MEDICAL HISTORY:     Past Medical History:   Diagnosis Date    Chronic midline low back pain without sciatica 10/2/2017    Colon polyps     Coronary artery disease involving native coronary artery of native heart 3/5/2020    Coronary artery disease involving native coronary artery of native heart with angina pectoris 12/10/2020    Diabetes mellitus with neurological manifestations, uncontrolled 1/24/2017    Diabetic polyneuropathy associated with type 2 diabetes mellitus 1/24/2017    Diabetic polyneuropathy associated with type 2 diabetes mellitus     Essential hypertension 1/24/2017    Gastroesophageal reflux disease 1/24/2017    Hyperlipidemia 1/24/2017    Insomnia 1/24/2017    Long-term insulin use 1/24/2017    Longstanding persistent atrial fibrillation 12/30/2020    Lumbar spondylosis  11/13/2017    Nuclear sclerosis of both eyes 8/24/2017    Obesity     PAD (peripheral artery disease) 3/23/2022    Stage 3 chronic kidney disease 2/13/2019    Uncontrolled type 2 diabetes mellitus without complication, with long-term current use of insulin 1/24/2017       PAST SURGICAL HISTORY:     Past Surgical History:   Procedure Laterality Date    BACK SURGERY      2002    CATARACT EXTRACTION W/  INTRAOCULAR LENS IMPLANT Right 08/29/2017    Dr. Hong    CATARACT EXTRACTION W/  INTRAOCULAR LENS IMPLANT Left 02/24/2022    Dr. Hong    COLONOSCOPY N/A 2/21/2017    Procedure: COLONOSCOPY;  Surgeon: Robb Rosado MD;  Location: [facility] ENDO;  Service: Endoscopy;  Laterality: N/A;    CORONARY ANGIOGRAPHY INCLUDING BYPASS GRAFTS WITH CATHETERIZATION OF LEFT HEART N/A 11/11/2020    Procedure: ANGIOGRAM, CORONARY, INCLUDING BYPASS GRAFT, WITH LEFT HEART CATHETERIZATION;  Surgeon: Lane Cuellar MD;  Location: [facility] CATH LAB;  Service: Cardiology;  Laterality: N/A;  RN PRE OP 11-5-2020. --COVID NEGATIVE ON  11-. C A    CORONARY ARTERY BYPASS GRAFT      March 2016    EPIDURAL STEROID INJECTION Bilateral 11/14/2018    Procedure: Lumbar Medial Branch Blocks;  Surgeon: Eze Byrd Jr., MD;  Location: [facility] ENDO;  Service: Pain Management;  Laterality: Bilateral;  Bilateral Lumbar Medial Branch Blocks L2, L3, L4, L5    [id number]  [id number]    Arrive @ 1150; NO Sedation    EPIDURAL STEROID INJECTION Bilateral 11/28/2018    Procedure: Injection, Steroid, Epidural;  Surgeon: Eze Byrd Jr., MD;  Location: [facility] ENDO;  Service: Pain Management;  Laterality: Bilateral;  Bilateral Sacroiliac Joint Steroid Injections    [id number]    Arrive @ 0910    EPIDURAL STEROID INJECTION Bilateral 3/20/2019    Procedure: Injection, Steroid, Sacroiliiac Joint;  Surgeon: Eze Byrd Jr., MD;  Location: [facility] ENDO;  Service: Pain Management;  Laterality: Bilateral;  Bilateral Sacroiliac Joint Steroid  "Injections    [id number]    Arrive @ 1145; Trigger point injections also?    INTRAOCULAR PROSTHESES INSERTION Left 2/24/2022    Procedure: INSERTION, IOL PROSTHESIS;  Surgeon: Nickolas Hong MD;  Location: [facility] OR;  Service: Ophthalmology;  Laterality: Left;    PHACOEMULSIFICATION OF CATARACT Left 2/24/2022    Procedure: PHACOEMULSIFICATION, CATARACT;  Surgeon: Nickolas Hong MD;  Location: [facility] OR;  Service: Ophthalmology;  Laterality: Left;  RN Phone Pre Op 2-16-22.  Covid NEGATIVE  2-23-22.  Arrival 08:00 am.    TONSILLECTOMY         ALLERGIES AND MEDICATION:     Review of patient's allergies indicates:   Allergen Reactions    Penicillins Other (See Comments)     Unknown reaction, Had a reaction as a child    Shrimp Itching     Hand itching        Medication List          Accurate as of April 6, 2022 11:30 AM. If you have any questions, ask your nurse or doctor.            CONTINUE taking these medications    ACCU-CHEK MOIZ CONTROL SOLN Soln  Generic drug: blood glucose control high,low     ACCU-CHEK MOIZ PLUS TEST STRP Strp  Generic drug: blood sugar diagnostic  TEST THREE TIMES DAILY     ACCU-CHEK SOFTCLIX LANCETS Misc  Generic drug: lancets     albuterol 90 mcg/actuation inhaler  Commonly known as: PROVENTIL/VENTOLIN HFA  Inhale 2 puffs into the lungs every 4 (four) hours as needed for Shortness of Breath. Rescue     amLODIPine 10 MG tablet  Commonly known as: NORVASC  TAKE 1 TABLET EVERY DAY     apixaban 5 mg Tab  Commonly known as: ELIQUIS  Take 1 tablet (5 mg total) by mouth 2 (two) times daily.     ascorbic acid (vitamin C) 100 MG tablet  Commonly known as: VITAMIN C     atorvastatin 40 MG tablet  Commonly known as: LIPITOR  TAKE 1 TABLET EVERY MORNING     b complex vitamins tablet     BD ALCOHOL SWABS Padm  Generic drug: alcohol swabs     BD ULTRA-FINE ROLAND PEN NEEDLE 32 gauge x 5/32" Ndle  Generic drug: pen needle, diabetic     * celecoxib 100 MG capsule  Commonly known as: " CeleBREX  Take 1 capsule (100 mg total) by mouth once daily.     * celecoxib 100 MG capsule  Commonly known as: CeleBREX  Take 1 capsule (100 mg total) by mouth once daily.     clopidogreL 75 mg tablet  Commonly known as: PLAVIX  Take 1 tablet (75 mg total) by mouth once daily.     ergocalciferol 50,000 unit Cap  Commonly known as: ERGOCALCIFEROL  TAKE ONE CAPSULE BY MOUTH WEEKLY     fenofibrate 160 MG Tab  TAKE 1 TABLET EVERY MORNING     furosemide 20 MG tablet  Commonly known as: LASIX  Take 1 tablet (20 mg total) by mouth 2 (two) times daily.     gabapentin 100 MG capsule  Commonly known as: NEURONTIN  TAKE 2 CAPSULES IN THE MORNING AND 3 CAPSULES WITH DINNER     glimepiride 2 MG tablet  Commonly known as: AMARYL  TAKE 1 TABLET EVERY DAY BEFORE BREAKFAST     isosorbide mononitrate 120 MG 24 hr tablet  Commonly known as: IMDUR  Take 1 tablet (120 mg total) by mouth once daily.     magnesium 250 mg Tab     metFORMIN 500 MG ER 24hr tablet  Commonly known as: GLUCOPHAGE-XR  Take 2 tablets twice daily with food     nitroGLYCERIN 0.4 MG SL tablet  Commonly known as: NITROSTAT  Place 1 tablet (0.4 mg total) under the tongue every 5 (five) minutes as needed for Chest pain.     omega-3 acid ethyl esters 1 gram capsule  Commonly known as: LOVAZA  Take 2 capsules (2 g total) by mouth 2 (two) times daily.     pantoprazole 40 MG tablet  Commonly known as: PROTONIX  Take 1 tablet (40 mg total) by mouth once daily.     semaglutide 1 mg/dose (4 mg/3 mL)  Commonly known as: OZEMPIC  Inject 1 mg into the skin every 7 days.     tiZANidine 4 MG tablet  Commonly known as: ZANAFLEX     TRESIBA FLEXTOUCH U-100 100 unit/mL (3 mL) insulin pen  Generic drug: insulin degludec  Inject 30 Units into the skin once daily.     triazolam 0.25 MG Tab  Commonly known as: HALCION  TK 1 T PO QHS, prn         * This list has 2 medication(s) that are the same as other medications prescribed for you. Read the directions carefully, and ask your doctor  or other care provider to review them with you.                SOCIAL HISTORY:     Social History     Socioeconomic History    Marital status:    Tobacco Use    Smoking status: Never Smoker    Smokeless tobacco: Never Used   Substance and Sexual Activity    Alcohol use: Yes     Comment: SOCIALLY    Drug use: No    Sexual activity: Yes     Partners: Female     Social Determinants of Health     Financial Resource Strain: Low Risk     Difficulty of Paying Living Expenses: Not hard at all   Food Insecurity: No Food Insecurity    Worried About Running Out of Food in the Last Year: Never true    Ran Out of Food in the Last Year: Never true   Transportation Needs: No Transportation Needs    Lack of Transportation (Medical): No    Lack of Transportation (Non-Medical): No   Physical Activity: Sufficiently Active    Days of Exercise per Week: 4 days    Minutes of Exercise per Session: 60 min   Stress: No Stress Concern Present    Feeling of Stress : Only a little   Social Connections: Unknown    Frequency of Communication with Friends and Family: More than three times a week    Frequency of Social Gatherings with Friends and Family: Three times a week    Active Member of Clubs or Organizations: Yes    Attends Club or Organization Meetings: More than 4 times per year    Marital Status:    Housing Stability: Low Risk     Unable to Pay for Housing in the Last Year: No    Number of Places Lived in the Last Year: 1    Unstable Housing in the Last Year: No       FAMILY HISTORY:     Family History   Problem Relation Age of Onset    Depression Mother     Heart disease Mother     Stroke Father     No Known Problems Sister     Blindness Brother     Diabetes Sister     No Known Problems Sister     No Known Problems Maternal Aunt     No Known Problems Maternal Uncle     No Known Problems Paternal Aunt     No Known Problems Paternal Uncle     No Known Problems Maternal Grandmother     No  "Known Problems Maternal Grandfather     No Known Problems Paternal Grandmother     No Known Problems Paternal Grandfather     Amblyopia Neg Hx     Cancer Neg Hx     Cataracts Neg Hx     Glaucoma Neg Hx     Hypertension Neg Hx     Macular degeneration Neg Hx     Retinal detachment Neg Hx     Strabismus Neg Hx     Thyroid disease Neg Hx        REVIEW OF SYSTEMS:   Review of Systems   Constitutional: Negative for chills, diaphoresis and fever.   HENT: Negative for nosebleeds.    Eyes: Negative for blurred vision, double vision and photophobia.   Respiratory: Positive for cough and shortness of breath. Negative for hemoptysis and wheezing.    Cardiovascular: Positive for chest pain and leg swelling. Negative for palpitations, orthopnea, claudication and PND.   Gastrointestinal: Negative for abdominal pain, blood in stool, heartburn, melena, nausea and vomiting.   Genitourinary: Negative for flank pain and hematuria.   Musculoskeletal: Negative for falls, myalgias and neck pain.   Skin: Negative for rash.   Neurological: Negative for dizziness, seizures, loss of consciousness, weakness and headaches.   Endo/Heme/Allergies: Negative for polydipsia. Does not bruise/bleed easily.   Psychiatric/Behavioral: Negative for depression and memory loss. The patient is not nervous/anxious.        PHYSICAL EXAM:     Vitals:    04/06/22 1112   BP: (!) 146/70   Pulse: 62   Resp: 15    Body mass index is 30.68 kg/m².  Weight: 88.9 kg (195 lb 14.1 oz)   Height: 5' 7" (170.2 cm)     Physical Exam  Vitals reviewed.   Constitutional:       General: He is not in acute distress.     Appearance: He is well-developed. He is obese. He is not ill-appearing, toxic-appearing or diaphoretic.   HENT:      Head: Normocephalic and atraumatic.   Eyes:      General: No scleral icterus.     Extraocular Movements: Extraocular movements intact.      Conjunctiva/sclera: Conjunctivae normal.      Pupils: Pupils are equal, round, and reactive to " light.   Neck:      Thyroid: No thyromegaly.      Vascular: Normal carotid pulses. No carotid bruit or JVD.      Trachea: Trachea normal.   Cardiovascular:      Rate and Rhythm: Bradycardia present. Rhythm irregularly irregular.      Heart sounds: S1 normal and S2 normal. Heart sounds are distant. No murmur heard.    No friction rub. No gallop.   Pulmonary:      Effort: Pulmonary effort is normal. No respiratory distress.      Breath sounds: Normal breath sounds. No stridor. No wheezing, rhonchi or rales.   Chest:      Chest wall: No tenderness.   Abdominal:      General: There is no distension.      Palpations: Abdomen is soft.   Musculoskeletal:         General: No tenderness. Normal range of motion.      Cervical back: Normal range of motion and neck supple. No edema or rigidity.      Right lower leg: Edema present.      Left lower leg: Edema present.   Feet:      Right foot:      Skin integrity: No ulcer.      Left foot:      Skin integrity: No ulcer.   Skin:     General: Skin is warm and dry.      Coloration: Skin is not jaundiced.   Neurological:      General: No focal deficit present.      Mental Status: He is alert and oriented to person, place, and time.      Cranial Nerves: No cranial nerve deficit.   Psychiatric:         Mood and Affect: Mood normal.         Speech: Speech normal.         Behavior: Behavior normal. Behavior is cooperative.         DATA:   EKG: (personally reviewed tracing)  3/23/22 AF 74, NSSTTW changes    Laboratory:  CBC:  Recent Labs   Lab 11/05/20  1305 12/07/20  1200 03/30/22  0908   WBC 7.99 5.82 4.72   Hemoglobin 12.2 L 10.6 L 9.5 L   Hematocrit 39.2 L 33.8 L 32.0 L   Platelets 248 231 188       CHEMISTRIES:  Recent Labs   Lab 12/07/20  1200 12/28/20  0735 01/06/21  0855 05/10/21  0945 09/15/21  0734 02/10/22  1014 03/30/22  0908   Glucose 102 133 H  --   --   --   --  121 H   Sodium 142 142  --   --   --   --  141   Potassium 5.1 5.4 H 5.2 H  --   --   --  5.0   BUN 21 29 H  --    --   --   --  18   Creatinine 1.2 1.3  --    < > 1.1 1.4 1.0   eGFR if  >60 >60  --    < > >60.0 57.6 A >60   eGFR if non  >60 55 A  --    < > >60.0 49.8 A >60   Calcium 9.3 9.6  --   --   --   --  9.3    < > = values in this interval not displayed.       CARDIAC BIOMARKERS:        COAGS:  Recent Labs   Lab 04/01/21  0033 04/21/21  0921 05/10/21  0945   INR 2.4 H 2.0 H 2.9 H       LIPIDS/LFTS:  Recent Labs   Lab 05/15/19  0828 08/29/19  0000 08/18/20  0928 02/10/22  1014   Cholesterol 139  --  137 163   Triglycerides 458 H 188 H 125 123   HDL 23 L 27 L 25 L 27 L   LDL Cholesterol Invalid, Trig>400.0 72 87.0 111.4   Non-HDL Cholesterol 116 100 112 136   AST 23  --  18  --    ALT 28  --  18  --        Cardiovascular Testing:  Echo 3/30/22  · The left ventricle is normal in size with mild concentric hypertrophy and normal systolic function.  · The estimated ejection fraction is 60%.  · Mild right ventricular enlargement with low normal right ventricular systolic function.  · Mild mitral regurgitation.  · Mild tricuspid regurgitation.  · The estimated PA systolic pressure is 22 mmHg.  · Atrial fibrillation observed.    Carotid US 3/30/22  · There is 40-49% right Internal Carotid Stenosis.  · There is 60-69% left Internal Carotid Stenosis.  · >50% bilateral ECA stenosis.  · Compared with prior report dated 4/18/18, ICA stenosis has progressed bilaterally.    Holter 5/19/21  · Atrial fibrillation. Heart rates varied between 31 and 112 bpm with an average of 57 bpm.  · There were rare PVCs totalling 100 and averaging 4.17 per hour.  · Longest RR interval 3.3 seconds    L MPI 5/19/21    Abnormal myocardial perfusion scan.    There is a mild intensity, fixed defect consistent with scar in the anterior wall.    There is a second moderate intensity, reversible defect that is consistent with ischemia in the inferior wall.    The gated perfusion images showed an ejection fraction of 61% post  stress.    The EKG portion of this study is negative for ischemia.    The patient reported no chest pain during the stress test.    There were no arrhythmias during stress.    PCI OM1 12/10/20 (images personally reviewed and interpreted)  1.  90% mid OM2 stenosis.  2.  Successful PCI with placement of a 2.25 x 26 mm drug-eluting stent reducing the 90% stenosis to 0%.  INGRIS 3 flow.  No dissection.  Good angiographic results.    Cath 11/11/20 (images personally reviewed and interpreted)  Left Main   The vessel is angiographically normal.   Left Anterior Descending   Subtotal mid occlusion. Diffusely diseased distal vessel   Left Circumflex   Long mid 80% between OM1 and OM2   First Obtuse Marginal Branch   90% mid   Second Obtuse Marginal Branch   80% mid   Right Coronary Artery   80% mid - moderate disffuse distal disease   LIMA Graft To Mid LAD   Mid to distal graft diffusely diseased 0 multile 80-90% lesions. Small diffusely diseased distal LAD   Saphenous Graft To RPDA   Patent with good runoff to PDA   Graft To 1st Diag   Patent to D1 with good runoff     Ex NUSRAT 4/18/18  Resting NUSRAT:   The right ankle brachial index was 1.04 which is normal.   The left ankle brachial index was 1.08 which is normal.   The right TBI is 0.55.   The left TBI is 0.98.   Exercise NUSRAT:   Post exercise right ankle pressure was 113 mmHg, resulting in a right post-exercise NUSRAT of 0.67.   Post exercise left ankle pressure was 150 mmHg, resulting in a left post-exercise NUSRAT of 0.89.       ASSESSMENT:   # CAD s/p CABG/PCI OM 12/2020, MAYEN and cough improved.  Had 1 episode of angina.  # HTN, uncontrolled  # HFpEF, vol overloaded on exam, but improved vs prior OV.  # Chr AF on eliquis 5mg bid, HR controlled  # HLP on atorva 40mg  # PAD, abnl NUSRAT 4/18/18  # carotid atherosclerosis (CUS 3/2022)  # L>R LE edema  # aortic atherosclerosis (CT Chest 10/29/18)    PLAN:   Cont med rx  Cont Plavix 75mg qd  Cont eliquis 5mg bid  Inc lasix 40mg  bid  Inc Imdur 240mg qd  Inc atorva 80mg qhs  Check BMP 2 weeks  Check LE venous US  RTC 4 weeks for review.  If sxs persist despite improved vol status/BP control, will consider cath.  Check lipids/LFT 3 months (July 2022)  Surveillance carotid US 6 months (Sept 2022)      Eze Purdy MD, FACC    Addendum 4/21/22    BMP  Lab Results   Component Value Date     04/20/2022    K 4.0 04/20/2022     04/20/2022    CO2 29 04/20/2022    BUN 32 (H) 04/20/2022    CREATININE 1.5 (H) 04/20/2022    CALCIUM 10.1 04/20/2022    ANIONGAP 11 04/20/2022    ESTGFRAFRICA 53 (A) 04/20/2022    EGFRNONAA 46 (A) 04/20/2022     Creat 1.0->1.5    Sent message to pt to decrease lasix back to 20mg bid and recheck BMP 1 week  Follow up as planned.

## 2022-04-11 ENCOUNTER — PES CALL (OUTPATIENT)
Dept: ADMINISTRATIVE | Facility: CLINIC | Age: 72
End: 2022-04-11
Payer: MEDICARE

## 2022-04-20 ENCOUNTER — HOSPITAL ENCOUNTER (OUTPATIENT)
Dept: CARDIOLOGY | Facility: HOSPITAL | Age: 72
Discharge: HOME | End: 2022-04-20
Attending: INTERNAL MEDICINE
Payer: MEDICARE

## 2022-04-20 DIAGNOSIS — R60.0 BILATERAL LOWER EXTREMITY EDEMA: ICD-10-CM

## 2022-04-20 PROCEDURE — 93970 CV US DOPPLER VENOUS LEGS BILATERAL (CUPID ONLY): ICD-10-PCS | Mod: 26,,, | Performed by: INTERNAL MEDICINE

## 2022-04-20 PROCEDURE — 93970 EXTREMITY STUDY: CPT | Mod: 50

## 2022-04-20 PROCEDURE — 93970 EXTREMITY STUDY: CPT | Mod: 26,,, | Performed by: INTERNAL MEDICINE

## 2022-04-21 ENCOUNTER — PATIENT MESSAGE (OUTPATIENT)
Dept: CARDIOLOGY | Facility: CLINIC | Age: 72
End: 2022-04-21
Payer: MEDICARE

## 2022-04-21 ENCOUNTER — TELEPHONE (OUTPATIENT)
Dept: CARDIOLOGY | Facility: CLINIC | Age: 72
End: 2022-04-21
Payer: MEDICARE

## 2022-04-21 RX ORDER — FUROSEMIDE 20 MG/1
20 TABLET ORAL 2 TIMES DAILY
Qty: 180 TABLET | Refills: 3
Start: 2022-04-21 | End: 2023-05-22

## 2022-04-21 NOTE — TELEPHONE ENCOUNTER
----- Message from Eze Purdy MD sent at 4/21/2022  8:31 AM CDT -----  Pls call pt and let him know his kidney fxn is a little off.  Have him decrease his lasix back to 20mg bid and recheck labs in 1 week (already ordered).  Follow up as planned in early May.    [facility]

## 2022-04-22 ENCOUNTER — PATIENT OUTREACH (OUTPATIENT)
Dept: ADMINISTRATIVE | Facility: OTHER | Age: 72
End: 2022-04-22
Payer: MEDICARE

## 2022-04-22 NOTE — PROGRESS NOTES
Health Maintenance Due   Topic Date Due    TETANUS VACCINE  Never done    Shingles Vaccine (1 of 2) Never done     Updates were requested from care everywhere.  Chart was reviewed for overdue Proactive [facility] Encounters (FAREED) topics (CRS, Breast Cancer Screening, Eye exam)  Health Maintenance has been updated.  LINKS immunization registry triggered.  Immunizations were reconciled.

## 2022-04-26 ENCOUNTER — OFFICE VISIT (OUTPATIENT)
Dept: ORTHOPEDICS | Facility: CLINIC | Age: 72
End: 2022-04-26
Payer: MEDICARE

## 2022-04-26 VITALS
WEIGHT: 192 LBS | OXYGEN SATURATION: 99 % | RESPIRATION RATE: 18 BRPM | SYSTOLIC BLOOD PRESSURE: 126 MMHG | BODY MASS INDEX: 30.07 KG/M2 | HEART RATE: 58 BPM | DIASTOLIC BLOOD PRESSURE: 58 MMHG

## 2022-04-26 DIAGNOSIS — G89.29 CHRONIC RIGHT SHOULDER PAIN: Primary | ICD-10-CM

## 2022-04-26 DIAGNOSIS — M75.112 NONTRAUMATIC INCOMPLETE TEAR OF LEFT ROTATOR CUFF: ICD-10-CM

## 2022-04-26 DIAGNOSIS — M25.511 CHRONIC RIGHT SHOULDER PAIN: Primary | ICD-10-CM

## 2022-04-26 PROCEDURE — 3078F DIAST BP <80 MM HG: CPT | Mod: CPTII,S$GLB,, | Performed by: PHYSICIAN ASSISTANT

## 2022-04-26 PROCEDURE — 3051F HG A1C>EQUAL 7.0%<8.0%: CPT | Mod: CPTII,S$GLB,, | Performed by: PHYSICIAN ASSISTANT

## 2022-04-26 PROCEDURE — 3066F NEPHROPATHY DOC TX: CPT | Mod: CPTII,S$GLB,, | Performed by: PHYSICIAN ASSISTANT

## 2022-04-26 PROCEDURE — 3074F PR MOST RECENT SYSTOLIC BLOOD PRESSURE < 130 MM HG: ICD-10-PCS | Mod: CPTII,S$GLB,, | Performed by: PHYSICIAN ASSISTANT

## 2022-04-26 PROCEDURE — 99999 PR PBB SHADOW E&M-EST. PATIENT-LVL V: ICD-10-PCS | Mod: PBBFAC,,, | Performed by: PHYSICIAN ASSISTANT

## 2022-04-26 PROCEDURE — 1125F AMNT PAIN NOTED PAIN PRSNT: CPT | Mod: CPTII,S$GLB,, | Performed by: PHYSICIAN ASSISTANT

## 2022-04-26 PROCEDURE — 1160F PR REVIEW ALL MEDS BY PRESCRIBER/CLIN PHARMACIST DOCUMENTED: ICD-10-PCS | Mod: CPTII,S$GLB,, | Performed by: PHYSICIAN ASSISTANT

## 2022-04-26 PROCEDURE — 1159F PR MEDICATION LIST DOCUMENTED IN MEDICAL RECORD: ICD-10-PCS | Mod: CPTII,S$GLB,, | Performed by: PHYSICIAN ASSISTANT

## 2022-04-26 PROCEDURE — 3072F LOW RISK FOR RETINOPATHY: CPT | Mod: CPTII,S$GLB,, | Performed by: PHYSICIAN ASSISTANT

## 2022-04-26 PROCEDURE — 99999 PR PBB SHADOW E&M-EST. PATIENT-LVL V: CPT | Mod: PBBFAC,,, | Performed by: PHYSICIAN ASSISTANT

## 2022-04-26 PROCEDURE — 99213 PR OFFICE/OUTPT VISIT, EST, LEVL III, 20-29 MIN: ICD-10-PCS | Mod: S$GLB,,, | Performed by: PHYSICIAN ASSISTANT

## 2022-04-26 PROCEDURE — 3051F PR MOST RECENT HEMOGLOBIN A1C LEVEL 7.0 - < 8.0%: ICD-10-PCS | Mod: CPTII,S$GLB,, | Performed by: PHYSICIAN ASSISTANT

## 2022-04-26 PROCEDURE — 3078F PR MOST RECENT DIASTOLIC BLOOD PRESSURE < 80 MM HG: ICD-10-PCS | Mod: CPTII,S$GLB,, | Performed by: PHYSICIAN ASSISTANT

## 2022-04-26 PROCEDURE — 3008F BODY MASS INDEX DOCD: CPT | Mod: CPTII,S$GLB,, | Performed by: PHYSICIAN ASSISTANT

## 2022-04-26 PROCEDURE — 1159F MED LIST DOCD IN RCRD: CPT | Mod: CPTII,S$GLB,, | Performed by: PHYSICIAN ASSISTANT

## 2022-04-26 PROCEDURE — 1125F PR PAIN SEVERITY QUANTIFIED, PAIN PRESENT: ICD-10-PCS | Mod: CPTII,S$GLB,, | Performed by: PHYSICIAN ASSISTANT

## 2022-04-26 PROCEDURE — 3008F PR BODY MASS INDEX (BMI) DOCUMENTED: ICD-10-PCS | Mod: CPTII,S$GLB,, | Performed by: PHYSICIAN ASSISTANT

## 2022-04-26 PROCEDURE — 3074F SYST BP LT 130 MM HG: CPT | Mod: CPTII,S$GLB,, | Performed by: PHYSICIAN ASSISTANT

## 2022-04-26 PROCEDURE — 3066F PR DOCUMENTATION OF TREATMENT FOR NEPHROPATHY: ICD-10-PCS | Mod: CPTII,S$GLB,, | Performed by: PHYSICIAN ASSISTANT

## 2022-04-26 PROCEDURE — 3072F PR LOW RISK FOR RETINOPATHY: ICD-10-PCS | Mod: CPTII,S$GLB,, | Performed by: PHYSICIAN ASSISTANT

## 2022-04-26 PROCEDURE — 99213 OFFICE O/P EST LOW 20 MIN: CPT | Mod: S$GLB,,, | Performed by: PHYSICIAN ASSISTANT

## 2022-04-26 PROCEDURE — 1160F RVW MEDS BY RX/DR IN RCRD: CPT | Mod: CPTII,S$GLB,, | Performed by: PHYSICIAN ASSISTANT

## 2022-04-26 PROCEDURE — 3060F POS MICROALBUMINURIA REV: CPT | Mod: CPTII,S$GLB,, | Performed by: PHYSICIAN ASSISTANT

## 2022-04-26 PROCEDURE — 3060F PR POS MICROALBUMINURIA RESULT DOCUMENTED/REVIEW: ICD-10-PCS | Mod: CPTII,S$GLB,, | Performed by: PHYSICIAN ASSISTANT

## 2022-04-26 NOTE — PROGRESS NOTES
Patient ID: Driss Hernandez Jr. is a 71 y.o. male.    Chief Complaint: Pain of the Right Shoulder      HISTORY:  HPI (10/5/21): Driss Hernandez Jr. is a 71 y.o. male who presents today complaining of right shoulder pain  Duration of symptoms:  About 6 months  Initially treated for cervical spine issues - no relief, patient believes pain is coming from the shoulder   Trauma or new activity: no  Pain is constant with progressive worsening throughout the day   Aggravating factors: has pain after activity  Does not have pain with reaching overhead, reaching behind his back etc  Has been doing some post melissa construction and he does not find this bothers him too much. Notes the pain more when he is at rest after work   First few months of pain he would feel better if he put his arm up over his head   Relieving factors: rest   Night pain is present and is disruptive to sleep  Radicular symptoms: no numbness, paresthesias   Pain radiates down arm but does not go past the elbow  Pain is localized to anterior shoulder   Prior treatment:  prescription NSAIDs (celebrex) with improvement in pain with doubled dose (done with ok of his PCP)  Pain does interfere with activities of daily living .     10/14/21  Still having pain   Says he lifted a 400 lb cabinet yesterday  He thinks his issues are correlated with coumadin use and multiple sticks for INR levels     1/10/22  Still having pain  Disruptive to sleep   Can sleep some with tizanidine   Not nearly as bad during the day   Only about 4-5 days of relief with the last injection   Therapy has been scheduled inconsistently   Was not compliant with HEP   Still doing a lot of heavylifting   Does not believe therapy will help, we have had this discussion multiple times and I have not be able to convince him otherwise   Reports biceps pain  As well as subdeltoid fossa pain, pain with reaching out in front and overhead     4/26/22  He continues to have pain in his right  shoulder.  Since his last visit, he has tried therapy.  He feels that he has not had any improvement.  He continues to complain of sleep disruption.  He has tried celebrex,which is helpful.  He is here today because he would like to discuss surgery.     This is the extent of the patient's complaints at this time.      Hand dominance: Right      PMH/PSH/FamHx/SocHx:    Unchanged from prior visit.    ROS:  Constitution: Negative for chills, fever and weakness.   Respiratory: Negative for cough and shortness of breath.   Musculoskeletal: Positive for right shoulder  Psychiatric/Behavioral: The patient is not nervous/anxious.       PHYSICAL EXAM:   BP (!) 126/58 (BP Location: Left arm, Patient Position: Sitting, BP Method: Large (Manual))   Pulse (!) 58   Resp 18   Wt 87.1 kg (192 lb 0.3 oz)   SpO2 99%   BMI 30.07 kg/m²   Right shoulder  Skin intact  ROM is painful  , ER 50, IR L5  + Jobes  + Fung  - drop arm    IMAGING: No new imaging today    ASSESSMENT/PLAN:    Driss was seen today for pain.    Diagnoses and all orders for this visit:    Chronic right shoulder pain    Nontraumatic incomplete tear of left rotator cuff    - Will discuss with Dr. Olivera upon her return this week.  He would like to have surgery on his shoulder.    - Patient will need cardiac clearance  - Will call to discuss pre op follow up

## 2022-04-28 ENCOUNTER — LAB VISIT (OUTPATIENT)
Dept: LAB | Facility: HOSPITAL | Age: 72
End: 2022-04-28
Attending: INTERNAL MEDICINE
Payer: MEDICARE

## 2022-04-28 DIAGNOSIS — I50.31 ACUTE DIASTOLIC CONGESTIVE HEART FAILURE: ICD-10-CM

## 2022-04-28 DIAGNOSIS — I10 ESSENTIAL HYPERTENSION: ICD-10-CM

## 2022-04-28 LAB
ANION GAP SERPL CALC-SCNC: 10 MMOL/L (ref 8–16)
BUN SERPL-MCNC: 30 MG/DL (ref 8–23)
CALCIUM SERPL-MCNC: 9.7 MG/DL (ref 8.7–10.5)
CHLORIDE SERPL-SCNC: 107 MMOL/L (ref 95–110)
CO2 SERPL-SCNC: 24 MMOL/L (ref 23–29)
CREAT SERPL-MCNC: 1.5 MG/DL (ref 0.5–1.4)
EST. GFR  (AFRICAN AMERICAN): 53 ML/MIN/1.73 M^2
EST. GFR  (NON AFRICAN AMERICAN): 46 ML/MIN/1.73 M^2
GLUCOSE SERPL-MCNC: 146 MG/DL (ref 70–110)
POTASSIUM SERPL-SCNC: 4.9 MMOL/L (ref 3.5–5.1)
SODIUM SERPL-SCNC: 141 MMOL/L (ref 136–145)

## 2022-04-28 PROCEDURE — 36415 COLL VENOUS BLD VENIPUNCTURE: CPT | Performed by: INTERNAL MEDICINE

## 2022-04-28 PROCEDURE — 80048 BASIC METABOLIC PNL TOTAL CA: CPT | Performed by: INTERNAL MEDICINE

## 2022-05-03 ENCOUNTER — TELEPHONE (OUTPATIENT)
Dept: ENDOCRINOLOGY | Facility: CLINIC | Age: 72
End: 2022-05-03
Payer: MEDICARE

## 2022-05-03 NOTE — TELEPHONE ENCOUNTER
Spoke to patient's wife, informed that medication was delivered to the clinic.    Patient came to  medication.

## 2022-05-05 ENCOUNTER — OFFICE VISIT (OUTPATIENT)
Dept: CARDIOLOGY | Facility: CLINIC | Age: 72
End: 2022-05-05
Payer: MEDICARE

## 2022-05-05 VITALS
RESPIRATION RATE: 15 BRPM | WEIGHT: 192.25 LBS | OXYGEN SATURATION: 99 % | HEART RATE: 60 BPM | BODY MASS INDEX: 30.17 KG/M2 | DIASTOLIC BLOOD PRESSURE: 76 MMHG | HEIGHT: 67 IN | SYSTOLIC BLOOD PRESSURE: 140 MMHG

## 2022-05-05 DIAGNOSIS — I10 ESSENTIAL HYPERTENSION: ICD-10-CM

## 2022-05-05 DIAGNOSIS — Z79.01 LONG TERM (CURRENT) USE OF ANTICOAGULANTS: ICD-10-CM

## 2022-05-05 DIAGNOSIS — Z95.1 HX OF CABG: ICD-10-CM

## 2022-05-05 DIAGNOSIS — I25.119 CORONARY ARTERY DISEASE INVOLVING NATIVE CORONARY ARTERY OF NATIVE HEART WITH ANGINA PECTORIS: ICD-10-CM

## 2022-05-05 DIAGNOSIS — Z01.810 PREOP CARDIOVASCULAR EXAM: ICD-10-CM

## 2022-05-05 DIAGNOSIS — I70.0 AORTIC ATHEROSCLEROSIS: ICD-10-CM

## 2022-05-05 DIAGNOSIS — N18.31 STAGE 3A CHRONIC KIDNEY DISEASE: ICD-10-CM

## 2022-05-05 DIAGNOSIS — E66.9 OBESITY (BMI 30.0-34.9): ICD-10-CM

## 2022-05-05 DIAGNOSIS — I73.9 PAD (PERIPHERAL ARTERY DISEASE): ICD-10-CM

## 2022-05-05 DIAGNOSIS — E11.9 DM TYPE 2 WITHOUT RETINOPATHY: ICD-10-CM

## 2022-05-05 DIAGNOSIS — I50.31 ACUTE DIASTOLIC CONGESTIVE HEART FAILURE: ICD-10-CM

## 2022-05-05 DIAGNOSIS — I48.11 LONGSTANDING PERSISTENT ATRIAL FIBRILLATION: ICD-10-CM

## 2022-05-05 DIAGNOSIS — I50.32 CHRONIC HEART FAILURE WITH PRESERVED EJECTION FRACTION: Primary | ICD-10-CM

## 2022-05-05 DIAGNOSIS — E78.2 MIXED HYPERLIPIDEMIA: ICD-10-CM

## 2022-05-05 DIAGNOSIS — I65.23 ATHEROSCLEROSIS OF BOTH CAROTID ARTERIES: ICD-10-CM

## 2022-05-05 PROCEDURE — 3077F SYST BP >= 140 MM HG: CPT | Mod: CPTII,S$GLB,, | Performed by: INTERNAL MEDICINE

## 2022-05-05 PROCEDURE — 1126F AMNT PAIN NOTED NONE PRSNT: CPT | Mod: CPTII,S$GLB,, | Performed by: INTERNAL MEDICINE

## 2022-05-05 PROCEDURE — 1126F PR PAIN SEVERITY QUANTIFIED, NO PAIN PRESENT: ICD-10-PCS | Mod: CPTII,S$GLB,, | Performed by: INTERNAL MEDICINE

## 2022-05-05 PROCEDURE — 3051F PR MOST RECENT HEMOGLOBIN A1C LEVEL 7.0 - < 8.0%: ICD-10-PCS | Mod: CPTII,S$GLB,, | Performed by: INTERNAL MEDICINE

## 2022-05-05 PROCEDURE — 3072F PR LOW RISK FOR RETINOPATHY: ICD-10-PCS | Mod: CPTII,S$GLB,, | Performed by: INTERNAL MEDICINE

## 2022-05-05 PROCEDURE — 1159F PR MEDICATION LIST DOCUMENTED IN MEDICAL RECORD: ICD-10-PCS | Mod: CPTII,S$GLB,, | Performed by: INTERNAL MEDICINE

## 2022-05-05 PROCEDURE — 3078F PR MOST RECENT DIASTOLIC BLOOD PRESSURE < 80 MM HG: ICD-10-PCS | Mod: CPTII,S$GLB,, | Performed by: INTERNAL MEDICINE

## 2022-05-05 PROCEDURE — 99214 PR OFFICE/OUTPT VISIT, EST, LEVL IV, 30-39 MIN: ICD-10-PCS | Mod: S$GLB,,, | Performed by: INTERNAL MEDICINE

## 2022-05-05 PROCEDURE — 1101F PT FALLS ASSESS-DOCD LE1/YR: CPT | Mod: CPTII,S$GLB,, | Performed by: INTERNAL MEDICINE

## 2022-05-05 PROCEDURE — 1101F PR PT FALLS ASSESS DOC 0-1 FALLS W/OUT INJ PAST YR: ICD-10-PCS | Mod: CPTII,S$GLB,, | Performed by: INTERNAL MEDICINE

## 2022-05-05 PROCEDURE — 3008F PR BODY MASS INDEX (BMI) DOCUMENTED: ICD-10-PCS | Mod: CPTII,S$GLB,, | Performed by: INTERNAL MEDICINE

## 2022-05-05 PROCEDURE — 3072F LOW RISK FOR RETINOPATHY: CPT | Mod: CPTII,S$GLB,, | Performed by: INTERNAL MEDICINE

## 2022-05-05 PROCEDURE — 3077F PR MOST RECENT SYSTOLIC BLOOD PRESSURE >= 140 MM HG: ICD-10-PCS | Mod: CPTII,S$GLB,, | Performed by: INTERNAL MEDICINE

## 2022-05-05 PROCEDURE — 3066F PR DOCUMENTATION OF TREATMENT FOR NEPHROPATHY: ICD-10-PCS | Mod: CPTII,S$GLB,, | Performed by: INTERNAL MEDICINE

## 2022-05-05 PROCEDURE — 3288F FALL RISK ASSESSMENT DOCD: CPT | Mod: CPTII,S$GLB,, | Performed by: INTERNAL MEDICINE

## 2022-05-05 PROCEDURE — 1160F RVW MEDS BY RX/DR IN RCRD: CPT | Mod: CPTII,S$GLB,, | Performed by: INTERNAL MEDICINE

## 2022-05-05 PROCEDURE — 3066F NEPHROPATHY DOC TX: CPT | Mod: CPTII,S$GLB,, | Performed by: INTERNAL MEDICINE

## 2022-05-05 PROCEDURE — 3288F PR FALLS RISK ASSESSMENT DOCUMENTED: ICD-10-PCS | Mod: CPTII,S$GLB,, | Performed by: INTERNAL MEDICINE

## 2022-05-05 PROCEDURE — 99999 PR PBB SHADOW E&M-EST. PATIENT-LVL V: ICD-10-PCS | Mod: PBBFAC,,, | Performed by: INTERNAL MEDICINE

## 2022-05-05 PROCEDURE — 99999 PR PBB SHADOW E&M-EST. PATIENT-LVL V: CPT | Mod: PBBFAC,,, | Performed by: INTERNAL MEDICINE

## 2022-05-05 PROCEDURE — 3060F POS MICROALBUMINURIA REV: CPT | Mod: CPTII,S$GLB,, | Performed by: INTERNAL MEDICINE

## 2022-05-05 PROCEDURE — 3008F BODY MASS INDEX DOCD: CPT | Mod: CPTII,S$GLB,, | Performed by: INTERNAL MEDICINE

## 2022-05-05 PROCEDURE — 1160F PR REVIEW ALL MEDS BY PRESCRIBER/CLIN PHARMACIST DOCUMENTED: ICD-10-PCS | Mod: CPTII,S$GLB,, | Performed by: INTERNAL MEDICINE

## 2022-05-05 PROCEDURE — 99214 OFFICE O/P EST MOD 30 MIN: CPT | Mod: S$GLB,,, | Performed by: INTERNAL MEDICINE

## 2022-05-05 PROCEDURE — 3051F HG A1C>EQUAL 7.0%<8.0%: CPT | Mod: CPTII,S$GLB,, | Performed by: INTERNAL MEDICINE

## 2022-05-05 PROCEDURE — 3060F PR POS MICROALBUMINURIA RESULT DOCUMENTED/REVIEW: ICD-10-PCS | Mod: CPTII,S$GLB,, | Performed by: INTERNAL MEDICINE

## 2022-05-05 PROCEDURE — 1159F MED LIST DOCD IN RCRD: CPT | Mod: CPTII,S$GLB,, | Performed by: INTERNAL MEDICINE

## 2022-05-05 PROCEDURE — 3078F DIAST BP <80 MM HG: CPT | Mod: CPTII,S$GLB,, | Performed by: INTERNAL MEDICINE

## 2022-05-05 RX ORDER — AMLODIPINE BESYLATE 5 MG/1
5 TABLET ORAL DAILY
Qty: 90 TABLET | Refills: 3 | Status: SHIPPED | OUTPATIENT
Start: 2022-05-05 | End: 2022-06-13

## 2022-05-05 RX ORDER — HYDRALAZINE HYDROCHLORIDE 50 MG/1
50 TABLET, FILM COATED ORAL 2 TIMES DAILY
Qty: 180 TABLET | Refills: 3 | Status: SHIPPED | OUTPATIENT
Start: 2022-05-05 | End: 2022-09-22

## 2022-05-05 NOTE — PROGRESS NOTES
CARDIOVASCULAR PROGRESS NOTE    REASON FOR CONSULT:   Driss Hernandez Jr. is a 71 y.o. male who presents for follow up of CAD, HFpEF.    PCP: Ehrensing  Ortho: Jarod  HISTORY OF PRESENT ILLNESS:   Patient returns for follow-up.  He tells me he is feeling much better and his cough has abated.  He is also able to sleep better.  He describes some dyspnea on exertion, although this is much improved.  He has had no angina, palpitations, or syncope.  There has been no PND, orthopnea, melena, hematuria, or claudicant symptoms.  His lower extremity edema persists but is also much improved.  He continues to take Eliquis for anticoagulation.    I reviewed the results of his lower extremity venous ultrasound which were negative.  His kidney function appears to be mildly abnormal after increasing his Lasix recently.  This persists despite us decreasing his Lasix from 40 mg b.i.d. back to 20 mg b.i.d..  I explained that we may have to accept some amount of azotemia in order to maintain euvolemia and improvement in his symptoms.  At this point, I suggested we decrease his amlodipine with the plan to discontinue it in the future.  We will add hydralazine 50 mg b.i.d. for afterload reduction.  I also discussed potentially adding Entresto, but the patient has some concerns about cost.    Lastly, the patient tells me he needs to have a shoulder arthroscopy by Dr. Olivera.  I took liberty exercise the patient the office today and he is able to climb a flight of stairs without any symptoms or limitations.      CARDIOVASCULAR HISTORY:   CAD 2016 CABG x3 (LIMA-LAD, SVG-D, SVG-PDA)   11/11/20 cath with diffuse native disease, patent SVG x2, LIMA-LAD atretic   12/10/20: PCI OM2 2.25x26 [facility] Isiah RICO    HFpEF    PAD    Chr AF on eliquis 5mg bid    PAST MEDICAL HISTORY:     Past Medical History:   Diagnosis Date    Chronic midline low back pain without sciatica 10/2/2017    Colon polyps     Coronary artery disease involving native  coronary artery of native heart 3/5/2020    Coronary artery disease involving native coronary artery of native heart with angina pectoris 12/10/2020    Diabetes mellitus with neurological manifestations, uncontrolled 1/24/2017    Diabetic polyneuropathy associated with type 2 diabetes mellitus 1/24/2017    Diabetic polyneuropathy associated with type 2 diabetes mellitus     Essential hypertension 1/24/2017    Gastroesophageal reflux disease 1/24/2017    Hyperlipidemia 1/24/2017    Insomnia 1/24/2017    Long-term insulin use 1/24/2017    Longstanding persistent atrial fibrillation 12/30/2020    Lumbar spondylosis 11/13/2017    Nuclear sclerosis of both eyes 8/24/2017    Obesity     PAD (peripheral artery disease) 3/23/2022    Stage 3 chronic kidney disease 2/13/2019    Uncontrolled type 2 diabetes mellitus without complication, with long-term current use of insulin 1/24/2017       PAST SURGICAL HISTORY:     Past Surgical History:   Procedure Laterality Date    BACK SURGERY      2002    CATARACT EXTRACTION W/  INTRAOCULAR LENS IMPLANT Right 08/29/2017    Dr. Hong    CATARACT EXTRACTION W/  INTRAOCULAR LENS IMPLANT Left 02/24/2022    Dr. Hong    COLONOSCOPY N/A 2/21/2017    Procedure: COLONOSCOPY;  Surgeon: Robb Rosado MD;  Location: [facility] ENDO;  Service: Endoscopy;  Laterality: N/A;    CORONARY ANGIOGRAPHY INCLUDING BYPASS GRAFTS WITH CATHETERIZATION OF LEFT HEART N/A 11/11/2020    Procedure: ANGIOGRAM, CORONARY, INCLUDING BYPASS GRAFT, WITH LEFT HEART CATHETERIZATION;  Surgeon: Lane Cuellar MD;  Location: [facility] CATH LAB;  Service: Cardiology;  Laterality: N/A;  RN PRE OP 11-5-2020. --COVID NEGATIVE ON  11-. C A    CORONARY ARTERY BYPASS GRAFT      March 2016    EPIDURAL STEROID INJECTION Bilateral 11/14/2018    Procedure: Lumbar Medial Branch Blocks;  Surgeon: Eze Byrd Jr., MD;  Location: [facility] ENDO;  Service: Pain Management;  Laterality: Bilateral;   Bilateral Lumbar Medial Branch Blocks L2, L3, L4, L5    14075  [id number]    Arrive @ 1150; NO Sedation    EPIDURAL STEROID INJECTION Bilateral 11/28/2018    Procedure: Injection, Steroid, Epidural;  Surgeon: Eze Byrd Jr., MD;  Location: [facility] ENDO;  Service: Pain Management;  Laterality: Bilateral;  Bilateral Sacroiliac Joint Steroid Injections    [id number]    Arrive @ 0910    EPIDURAL STEROID INJECTION Bilateral 3/20/2019    Procedure: Injection, Steroid, Sacroiliiac Joint;  Surgeon: Eze Byrd Jr., MD;  Location: [facility] ENDO;  Service: Pain Management;  Laterality: Bilateral;  Bilateral Sacroiliac Joint Steroid Injections    [id number]    Arrive @ 1145; Trigger point injections also?    INTRAOCULAR PROSTHESES INSERTION Left 2/24/2022    Procedure: INSERTION, IOL PROSTHESIS;  Surgeon: Nickolas Hong MD;  Location: [facility] OR;  Service: Ophthalmology;  Laterality: Left;    PHACOEMULSIFICATION OF CATARACT Left 2/24/2022    Procedure: PHACOEMULSIFICATION, CATARACT;  Surgeon: Nickolas Hong MD;  Location: [facility] OR;  Service: Ophthalmology;  Laterality: Left;  RN Phone Pre Op 2-16-22.  Covid NEGATIVE  2-23-22.  Arrival 08:00 am.    TONSILLECTOMY         ALLERGIES AND MEDICATION:     Review of patient's allergies indicates:   Allergen Reactions    Penicillins Other (See Comments)     Unknown reaction, Had a reaction as a child    Shrimp Itching     Hand itching        Medication List          Accurate as of May 5, 2022  9:18 AM. If you have any questions, ask your nurse or doctor.            CONTINUE taking these medications    ACCU-CHEK MOIZ CONTROL SOLN Soln  Generic drug: blood glucose control high,low     ACCU-CHEK MOIZ PLUS TEST STRP Strp  Generic drug: blood sugar diagnostic  TEST THREE TIMES DAILY     ACCU-CHEK SOFTCLIX LANCETS Misc  Generic drug: lancets     albuterol 90 mcg/actuation inhaler  Commonly known as: PROVENTIL/VENTOLIN HFA  Inhale 2 puffs into the lungs every 4 (four) hours as  "needed for Shortness of Breath. Rescue     amLODIPine 10 MG tablet  Commonly known as: NORVASC  TAKE 1 TABLET EVERY DAY     apixaban 5 mg Tab  Commonly known as: ELIQUIS  Take 1 tablet (5 mg total) by mouth 2 (two) times daily.     ascorbic acid (vitamin C) 100 MG tablet  Commonly known as: VITAMIN C     atorvastatin 80 MG tablet  Commonly known as: LIPITOR  Take 1 tablet (80 mg total) by mouth every evening.     b complex vitamins tablet     BD ALCOHOL SWABS Padm  Generic drug: alcohol swabs     BD ULTRA-FINE ROLAND PEN NEEDLE 32 gauge x 5/32" Ndle  Generic drug: pen needle, diabetic     celecoxib 100 MG capsule  Commonly known as: CeleBREX  Take 1 capsule (100 mg total) by mouth once daily.     clopidogreL 75 mg tablet  Commonly known as: PLAVIX  Take 1 tablet (75 mg total) by mouth once daily.     ergocalciferol 50,000 unit Cap  Commonly known as: ERGOCALCIFEROL  TAKE ONE CAPSULE BY MOUTH WEEKLY     fenofibrate 160 MG Tab  TAKE 1 TABLET EVERY MORNING     furosemide 20 MG tablet  Commonly known as: LASIX  Take 1 tablet (20 mg total) by mouth 2 (two) times daily.     gabapentin 100 MG capsule  Commonly known as: NEURONTIN  TAKE 2 CAPSULES IN THE MORNING AND 3 CAPSULES WITH DINNER     glimepiride 2 MG tablet  Commonly known as: AMARYL  TAKE 1 TABLET EVERY DAY BEFORE BREAKFAST     isosorbide mononitrate 120 MG 24 hr tablet  Commonly known as: IMDUR  Take 2 tablets (240 mg total) by mouth once daily.     magnesium 250 mg Tab     metFORMIN 500 MG ER 24hr tablet  Commonly known as: GLUCOPHAGE-XR  Take 2 tablets twice daily with food     nitroGLYCERIN 0.4 MG SL tablet  Commonly known as: NITROSTAT  Place 1 tablet (0.4 mg total) under the tongue every 5 (five) minutes as needed for Chest pain.     omega-3 acid ethyl esters 1 gram capsule  Commonly known as: LOVAZA  Take 2 capsules (2 g total) by mouth 2 (two) times daily.     pantoprazole 40 MG tablet  Commonly known as: PROTONIX  Take 1 tablet (40 mg total) by mouth once " daily.     semaglutide 1 mg/dose (4 mg/3 mL)  Commonly known as: OZEMPIC  Inject 1 mg into the skin every 7 days.     tiZANidine 4 MG tablet  Commonly known as: ZANAFLEX     TRESIBA FLEXTOUCH U-100 100 unit/mL (3 mL) insulin pen  Generic drug: insulin degludec  Inject 30 Units into the skin once daily.     triazolam 0.25 MG Tab  Commonly known as: HALCION  TK 1 T PO QHS, prn            SOCIAL HISTORY:     Social History     Socioeconomic History    Marital status:    Tobacco Use    Smoking status: Never Smoker    Smokeless tobacco: Never Used   Substance and Sexual Activity    Alcohol use: Yes     Comment: SOCIALLY    Drug use: No    Sexual activity: Yes     Partners: Female     Social Determinants of Health     Financial Resource Strain: Low Risk     Difficulty of Paying Living Expenses: Not hard at all   Food Insecurity: No Food Insecurity    Worried About Running Out of Food in the Last Year: Never true    Ran Out of Food in the Last Year: Never true   Transportation Needs: No Transportation Needs    Lack of Transportation (Medical): No    Lack of Transportation (Non-Medical): No   Physical Activity: Insufficiently Active    Days of Exercise per Week: 3 days    Minutes of Exercise per Session: 30 min   Stress: No Stress Concern Present    Feeling of Stress : Only a little   Social Connections: Unknown    Frequency of Communication with Friends and Family: More than three times a week    Frequency of Social Gatherings with Friends and Family: More than three times a week    Active Member of Clubs or Organizations: Yes    Attends Club or Organization Meetings: More than 4 times per year    Marital Status:    Housing Stability: Low Risk     Unable to Pay for Housing in the Last Year: No    Number of Places Lived in the Last Year: 1    Unstable Housing in the Last Year: No       FAMILY HISTORY:     Family History   Problem Relation Age of Onset    Depression Mother     Heart  "disease Mother     Stroke Father     No Known Problems Sister     Blindness Brother     Diabetes Sister     No Known Problems Sister     No Known Problems Maternal Aunt     No Known Problems Maternal Uncle     No Known Problems Paternal Aunt     No Known Problems Paternal Uncle     No Known Problems Maternal Grandmother     No Known Problems Maternal Grandfather     No Known Problems Paternal Grandmother     No Known Problems Paternal Grandfather     Amblyopia Neg Hx     Cancer Neg Hx     Cataracts Neg Hx     Glaucoma Neg Hx     Hypertension Neg Hx     Macular degeneration Neg Hx     Retinal detachment Neg Hx     Strabismus Neg Hx     Thyroid disease Neg Hx        REVIEW OF SYSTEMS:   Review of Systems   Constitutional: Negative for chills, diaphoresis and fever.   HENT: Negative for nosebleeds.    Eyes: Negative for blurred vision, double vision and photophobia.   Respiratory: Positive for shortness of breath. Negative for cough, hemoptysis and wheezing.    Cardiovascular: Positive for leg swelling. Negative for chest pain, palpitations, orthopnea, claudication and PND.   Gastrointestinal: Negative for abdominal pain, blood in stool, heartburn, melena, nausea and vomiting.   Genitourinary: Negative for flank pain and hematuria.   Musculoskeletal: Negative for falls, myalgias and neck pain.   Skin: Negative for rash.   Neurological: Negative for dizziness, seizures, loss of consciousness, weakness and headaches.   Endo/Heme/Allergies: Negative for polydipsia. Does not bruise/bleed easily.   Psychiatric/Behavioral: Negative for depression and memory loss. The patient is not nervous/anxious.        PHYSICAL EXAM:     Vitals:    05/05/22 0909   BP: (!) 140/76   Pulse: 60   Resp: 15    Body mass index is 30.11 kg/m².  Weight: 87.2 kg (192 lb 3.9 oz)   Height: 5' 7" (170.2 cm)     Physical Exam  Vitals reviewed.   Constitutional:       General: He is not in acute distress.     Appearance: He is " well-developed. He is obese. He is not ill-appearing, toxic-appearing or diaphoretic.   HENT:      Head: Normocephalic and atraumatic.   Eyes:      General: No scleral icterus.     Extraocular Movements: Extraocular movements intact.      Conjunctiva/sclera: Conjunctivae normal.      Pupils: Pupils are equal, round, and reactive to light.   Neck:      Thyroid: No thyromegaly.      Vascular: Normal carotid pulses. No carotid bruit or JVD.      Trachea: Trachea normal.   Cardiovascular:      Rate and Rhythm: Bradycardia present. Rhythm irregularly irregular.      Heart sounds: S1 normal and S2 normal. Heart sounds are distant. No murmur heard.    No friction rub. No gallop.   Pulmonary:      Effort: Pulmonary effort is normal. No respiratory distress.      Breath sounds: Normal breath sounds. No stridor. No wheezing, rhonchi or rales.   Chest:      Chest wall: No tenderness.   Abdominal:      General: There is no distension.      Palpations: Abdomen is soft.   Musculoskeletal:         General: No tenderness. Normal range of motion.      Cervical back: Normal range of motion and neck supple. No edema or rigidity.      Right lower leg: Edema present.      Left lower leg: Edema present.   Feet:      Right foot:      Skin integrity: No ulcer.      Left foot:      Skin integrity: No ulcer.   Skin:     General: Skin is warm and dry.      Coloration: Skin is not jaundiced.   Neurological:      General: No focal deficit present.      Mental Status: He is alert and oriented to person, place, and time.      Cranial Nerves: No cranial nerve deficit.   Psychiatric:         Mood and Affect: Mood normal.         Speech: Speech normal.         Behavior: Behavior normal. Behavior is cooperative.         DATA:   EKG: (personally reviewed tracing)  3/23/22 AF 74, NSSTTW changes    Laboratory:  CBC:  Recent Labs   Lab 11/05/20  1305 12/07/20  1200 03/30/22  0908   WBC 7.99 5.82 4.72   Hemoglobin 12.2 L 10.6 L 9.5 L   Hematocrit 39.2 L  33.8 L 32.0 L   Platelets 248 231 188       CHEMISTRIES:  Recent Labs   Lab 03/30/22  0908 04/20/22  0715 04/28/22  0923   Glucose 121 H 177 H 146 H   Sodium 141 140 141   Potassium 5.0 4.0 4.9   BUN 18 32 H 30 H   Creatinine 1.0 1.5 H 1.5 H   eGFR if  >60 53 A 53 A   eGFR if non  >60 46 A 46 A   Calcium 9.3 10.1 9.7       CARDIAC BIOMARKERS:        COAGS:  Recent Labs   Lab 04/01/21  0033 04/21/21  0921 05/10/21  0945   INR 2.4 H 2.0 H 2.9 H       LIPIDS/LFTS:  Recent Labs   Lab 05/15/19  0828 08/29/19  0000 08/18/20  0928 02/10/22  1014   Cholesterol 139  --  137 163   Triglycerides 458 H 188 H 125 123   HDL 23 L 27 L 25 L 27 L   LDL Cholesterol Invalid, Trig>400.0 72 87.0 111.4   Non-HDL Cholesterol 116 100 112 136   AST 23  --  18  --    ALT 28  --  18  --        Cardiovascular Testing:  LE venous US 4/20/22  · There is no evidence of a right lower extremity DVT.  · There is no evidence of a left lower extremity DVT.  · 1.2x0.6cm non vascular structure noted in left proximal calf.    Echo 3/30/22  · The left ventricle is normal in size with mild concentric hypertrophy and normal systolic function.  · The estimated ejection fraction is 60%.  · Mild right ventricular enlargement with low normal right ventricular systolic function.  · Mild mitral regurgitation.  · Mild tricuspid regurgitation.  · The estimated PA systolic pressure is 22 mmHg.  · Atrial fibrillation observed.    Carotid US 3/30/22  · There is 40-49% right Internal Carotid Stenosis.  · There is 60-69% left Internal Carotid Stenosis.  · >50% bilateral ECA stenosis.  · Compared with prior report dated 4/18/18, ICA stenosis has progressed bilaterally.    Holter 5/19/21  · Atrial fibrillation. Heart rates varied between 31 and 112 bpm with an average of 57 bpm.  · There were rare PVCs totalling 100 and averaging 4.17 per hour.  · Longest RR interval 3.3 seconds    L MPI 5/19/21    Abnormal myocardial perfusion scan.     There is a mild intensity, fixed defect consistent with scar in the anterior wall.    There is a second moderate intensity, reversible defect that is consistent with ischemia in the inferior wall.    The gated perfusion images showed an ejection fraction of 61% post stress.    The EKG portion of this study is negative for ischemia.    The patient reported no chest pain during the stress test.    There were no arrhythmias during stress.    PCI OM1 12/10/20 (images personally reviewed and interpreted)  1.  90% mid OM2 stenosis.  2.  Successful PCI with placement of a 2.25 x 26 mm drug-eluting stent reducing the 90% stenosis to 0%.  INGRIS 3 flow.  No dissection.  Good angiographic results.    Cath 11/11/20 (images personally reviewed and interpreted)  Left Main   The vessel is angiographically normal.   Left Anterior Descending   Subtotal mid occlusion. Diffusely diseased distal vessel   Left Circumflex   Long mid 80% between OM1 and OM2   First Obtuse Marginal Branch   90% mid   Second Obtuse Marginal Branch   80% mid   Right Coronary Artery   80% mid - moderate disffuse distal disease   LIMA Graft To Mid LAD   Mid to distal graft diffusely diseased 0 multile 80-90% lesions. Small diffusely diseased distal LAD   Saphenous Graft To RPDA   Patent with good runoff to PDA   Graft To 1st Diag   Patent to D1 with good runoff     Ex NUSRAT 4/18/18  Resting NUSRAT:   The right ankle brachial index was 1.04 which is normal.   The left ankle brachial index was 1.08 which is normal.   The right TBI is 0.55.   The left TBI is 0.98.   Exercise NUSRAT:   Post exercise right ankle pressure was 113 mmHg, resulting in a right post-exercise NUSRAT of 0.67.   Post exercise left ankle pressure was 150 mmHg, resulting in a left post-exercise NUSRAT of 0.89.       ASSESSMENT:   # CAD s/p CABG/PCI OM 12/2020, MAYEN and cough much improved.   # HTN, uncontrolled  # HFpEF, mildly vol overloaded on exam, but improved vs prior OV.  # Chr AF on eliquis 5mg  bid, HR controlled  # HLP on atorva 40mg  # PAD, abnl NUSRAT 4/18/18  # carotid atherosclerosis (CUS 3/2022)  # L>R LE edema, improved.  LE venous US 4/2022 neg.  # aortic atherosclerosis (CT Chest 10/29/18)  # preop for R shoulder ortho procedure.  The patient demonstrated good exercise capacity office today.    PLAN:   Cont med rx  The patient is at low cardiac risk for the planned orthopedic surgical procedure and associated anesthesia.  No further preoperative cardiac testing is required.  If needed, it is acceptable hold the Eliquis for 3 days prior to surgery and resume it as soon as possible thereafter.  If needed, it is acceptable to hold the Plavix for 5 days prior to surgery and resume it as soon as possible thereafter.  Cont Plavix 75mg qd  Cont eliquis 5mg bid  Cont lasix 20mg bid, may have to accept some degree of azotemia to maintain euvolemia.  Dec amlod 5mg qd (?contributing to edema/vol overload), consider stopping the future.  Add hydrala 50mg bid  Check BMP 2 weeks  Enroll in digital med programs  RTC 4 weeks for review and titration of hydrala/stopping amlod.  Consider addition of entresto (cost concerns at present).  Check lipids/LFT 2 months (July 2022)  Surveillance carotid US 5 months (Sept 2022)      Eze Purdy MD, FACC

## 2022-05-06 ENCOUNTER — PES CALL (OUTPATIENT)
Dept: ADMINISTRATIVE | Facility: CLINIC | Age: 72
End: 2022-05-06
Payer: MEDICARE

## 2022-05-06 ENCOUNTER — TELEPHONE (OUTPATIENT)
Dept: CARDIOLOGY | Facility: CLINIC | Age: 72
End: 2022-05-06
Payer: MEDICARE

## 2022-05-06 ENCOUNTER — PATIENT MESSAGE (OUTPATIENT)
Dept: CARDIOLOGY | Facility: CLINIC | Age: 72
End: 2022-05-06
Payer: MEDICARE

## 2022-05-06 NOTE — TELEPHONE ENCOUNTER
I called pt's wife as the pt requested. I informed her that Dr. Purdy sent out the pt's new prescriptions yesterday and read her Dr. Purdy's notes on the frequency to take said medications.

## 2022-05-09 ENCOUNTER — TELEPHONE (OUTPATIENT)
Dept: PAIN MEDICINE | Facility: CLINIC | Age: 72
End: 2022-05-09
Payer: MEDICARE

## 2022-05-09 NOTE — TELEPHONE ENCOUNTER
----- Message from Angie Burgos sent at 5/9/2022  3:30 PM CDT -----  Type: Patient Call Back    Who called:Self    What is the request in detail: Pt called to schedule appt soonest is 7/18. Pt has been having bad back pain. Please call    Can the clinic reply by [facility]? no    Would the patient rather a call back or a response via [facility]?  Call back    Best call back number: [phone] (home)

## 2022-05-10 ENCOUNTER — TELEPHONE (OUTPATIENT)
Dept: PAIN MEDICINE | Facility: CLINIC | Age: 72
End: 2022-05-10
Payer: MEDICARE

## 2022-05-10 NOTE — TELEPHONE ENCOUNTER
Sent called and spoke with pt in concerns of scheduling a sooner appt. Staff provided a number for pt to speak with someone in the clinic where he would need to schedule.pt verbalized understanding and confirmed SG      ----- Message from Angie Burgos sent at 5/10/2022  9:14 AM CDT -----  Type: Patient Call Back    Who called:Self    What is the request in detail: Pt states that he was in severe pain last night. Would like sooner appt     Can the clinic reply by [facility]? no    Would the patient rather a call back or a response via [facility]? Call back    Best call back number: [phone]

## 2022-05-11 ENCOUNTER — LAB VISIT (OUTPATIENT)
Dept: LAB | Facility: HOSPITAL | Age: 72
End: 2022-05-11
Attending: INTERNAL MEDICINE
Payer: MEDICARE

## 2022-05-11 DIAGNOSIS — N28.9 RENAL INSUFFICIENCY: ICD-10-CM

## 2022-05-11 DIAGNOSIS — I25.10 CORONARY ARTERY DISEASE INVOLVING NATIVE CORONARY ARTERY OF NATIVE HEART WITHOUT ANGINA PECTORIS: ICD-10-CM

## 2022-05-11 LAB
CHOLEST SERPL-MCNC: 123 MG/DL (ref 120–199)
CHOLEST/HDLC SERPL: 5.6 {RATIO} (ref 2–5)
CREAT SERPL-MCNC: 1.5 MG/DL (ref 0.5–1.4)
EST. GFR  (AFRICAN AMERICAN): 53 ML/MIN/1.73 M^2
EST. GFR  (NON AFRICAN AMERICAN): 45.9 ML/MIN/1.73 M^2
ESTIMATED AVG GLUCOSE: 166 MG/DL (ref 68–131)
HBA1C MFR BLD: 7.4 % (ref 4–5.6)
HDLC SERPL-MCNC: 22 MG/DL (ref 40–75)
HDLC SERPL: 17.9 % (ref 20–50)
LDLC SERPL CALC-MCNC: 77.8 MG/DL (ref 63–159)
NONHDLC SERPL-MCNC: 101 MG/DL
TRIGL SERPL-MCNC: 116 MG/DL (ref 30–150)

## 2022-05-11 PROCEDURE — 36415 COLL VENOUS BLD VENIPUNCTURE: CPT | Mod: PO | Performed by: NURSE PRACTITIONER

## 2022-05-11 PROCEDURE — 82565 ASSAY OF CREATININE: CPT | Performed by: NURSE PRACTITIONER

## 2022-05-11 PROCEDURE — 83036 HEMOGLOBIN GLYCOSYLATED A1C: CPT | Performed by: NURSE PRACTITIONER

## 2022-05-11 PROCEDURE — 80061 LIPID PANEL: CPT | Performed by: NURSE PRACTITIONER

## 2022-05-12 ENCOUNTER — PATIENT MESSAGE (OUTPATIENT)
Dept: CARDIOLOGY | Facility: CLINIC | Age: 72
End: 2022-05-12
Payer: MEDICARE

## 2022-05-12 RX ORDER — PANTOPRAZOLE SODIUM 40 MG/1
40 TABLET, DELAYED RELEASE ORAL DAILY
Qty: 90 TABLET | Refills: 3 | OUTPATIENT
Start: 2022-05-12 | End: 2023-05-12

## 2022-05-16 ENCOUNTER — OFFICE VISIT (OUTPATIENT)
Dept: ORTHOPEDICS | Facility: CLINIC | Age: 72
End: 2022-05-16
Payer: MEDICARE

## 2022-05-16 VITALS
WEIGHT: 192.25 LBS | SYSTOLIC BLOOD PRESSURE: 122 MMHG | OXYGEN SATURATION: 97 % | BODY MASS INDEX: 30.17 KG/M2 | RESPIRATION RATE: 18 BRPM | HEART RATE: 58 BPM | DIASTOLIC BLOOD PRESSURE: 60 MMHG | HEIGHT: 67 IN

## 2022-05-16 DIAGNOSIS — M75.112 NONTRAUMATIC INCOMPLETE TEAR OF LEFT ROTATOR CUFF: Primary | ICD-10-CM

## 2022-05-16 PROCEDURE — 1159F PR MEDICATION LIST DOCUMENTED IN MEDICAL RECORD: ICD-10-PCS | Mod: CPTII,S$GLB,, | Performed by: ORTHOPAEDIC SURGERY

## 2022-05-16 PROCEDURE — 3008F PR BODY MASS INDEX (BMI) DOCUMENTED: ICD-10-PCS | Mod: CPTII,S$GLB,, | Performed by: ORTHOPAEDIC SURGERY

## 2022-05-16 PROCEDURE — 3288F PR FALLS RISK ASSESSMENT DOCUMENTED: ICD-10-PCS | Mod: CPTII,S$GLB,, | Performed by: ORTHOPAEDIC SURGERY

## 2022-05-16 PROCEDURE — 3066F NEPHROPATHY DOC TX: CPT | Mod: CPTII,S$GLB,, | Performed by: ORTHOPAEDIC SURGERY

## 2022-05-16 PROCEDURE — 3072F PR LOW RISK FOR RETINOPATHY: ICD-10-PCS | Mod: CPTII,S$GLB,, | Performed by: ORTHOPAEDIC SURGERY

## 2022-05-16 PROCEDURE — 3060F PR POS MICROALBUMINURIA RESULT DOCUMENTED/REVIEW: ICD-10-PCS | Mod: CPTII,S$GLB,, | Performed by: ORTHOPAEDIC SURGERY

## 2022-05-16 PROCEDURE — 3074F PR MOST RECENT SYSTOLIC BLOOD PRESSURE < 130 MM HG: ICD-10-PCS | Mod: CPTII,S$GLB,, | Performed by: ORTHOPAEDIC SURGERY

## 2022-05-16 PROCEDURE — 1101F PR PT FALLS ASSESS DOC 0-1 FALLS W/OUT INJ PAST YR: ICD-10-PCS | Mod: CPTII,S$GLB,, | Performed by: ORTHOPAEDIC SURGERY

## 2022-05-16 PROCEDURE — 3078F DIAST BP <80 MM HG: CPT | Mod: CPTII,S$GLB,, | Performed by: ORTHOPAEDIC SURGERY

## 2022-05-16 PROCEDURE — 1125F AMNT PAIN NOTED PAIN PRSNT: CPT | Mod: CPTII,S$GLB,, | Performed by: ORTHOPAEDIC SURGERY

## 2022-05-16 PROCEDURE — 3060F POS MICROALBUMINURIA REV: CPT | Mod: CPTII,S$GLB,, | Performed by: ORTHOPAEDIC SURGERY

## 2022-05-16 PROCEDURE — 99213 OFFICE O/P EST LOW 20 MIN: CPT | Mod: S$GLB,,, | Performed by: ORTHOPAEDIC SURGERY

## 2022-05-16 PROCEDURE — 3072F LOW RISK FOR RETINOPATHY: CPT | Mod: CPTII,S$GLB,, | Performed by: ORTHOPAEDIC SURGERY

## 2022-05-16 PROCEDURE — 1101F PT FALLS ASSESS-DOCD LE1/YR: CPT | Mod: CPTII,S$GLB,, | Performed by: ORTHOPAEDIC SURGERY

## 2022-05-16 PROCEDURE — 1125F PR PAIN SEVERITY QUANTIFIED, PAIN PRESENT: ICD-10-PCS | Mod: CPTII,S$GLB,, | Performed by: ORTHOPAEDIC SURGERY

## 2022-05-16 PROCEDURE — 1159F MED LIST DOCD IN RCRD: CPT | Mod: CPTII,S$GLB,, | Performed by: ORTHOPAEDIC SURGERY

## 2022-05-16 PROCEDURE — 99213 PR OFFICE/OUTPT VISIT, EST, LEVL III, 20-29 MIN: ICD-10-PCS | Mod: S$GLB,,, | Performed by: ORTHOPAEDIC SURGERY

## 2022-05-16 PROCEDURE — 3066F PR DOCUMENTATION OF TREATMENT FOR NEPHROPATHY: ICD-10-PCS | Mod: CPTII,S$GLB,, | Performed by: ORTHOPAEDIC SURGERY

## 2022-05-16 PROCEDURE — 1160F RVW MEDS BY RX/DR IN RCRD: CPT | Mod: CPTII,S$GLB,, | Performed by: ORTHOPAEDIC SURGERY

## 2022-05-16 PROCEDURE — 3288F FALL RISK ASSESSMENT DOCD: CPT | Mod: CPTII,S$GLB,, | Performed by: ORTHOPAEDIC SURGERY

## 2022-05-16 PROCEDURE — 1160F PR REVIEW ALL MEDS BY PRESCRIBER/CLIN PHARMACIST DOCUMENTED: ICD-10-PCS | Mod: CPTII,S$GLB,, | Performed by: ORTHOPAEDIC SURGERY

## 2022-05-16 PROCEDURE — 3051F PR MOST RECENT HEMOGLOBIN A1C LEVEL 7.0 - < 8.0%: ICD-10-PCS | Mod: CPTII,S$GLB,, | Performed by: ORTHOPAEDIC SURGERY

## 2022-05-16 PROCEDURE — 3074F SYST BP LT 130 MM HG: CPT | Mod: CPTII,S$GLB,, | Performed by: ORTHOPAEDIC SURGERY

## 2022-05-16 PROCEDURE — 3078F PR MOST RECENT DIASTOLIC BLOOD PRESSURE < 80 MM HG: ICD-10-PCS | Mod: CPTII,S$GLB,, | Performed by: ORTHOPAEDIC SURGERY

## 2022-05-16 PROCEDURE — 99999 PR PBB SHADOW E&M-EST. PATIENT-LVL IV: ICD-10-PCS | Mod: PBBFAC,,, | Performed by: ORTHOPAEDIC SURGERY

## 2022-05-16 PROCEDURE — 3008F BODY MASS INDEX DOCD: CPT | Mod: CPTII,S$GLB,, | Performed by: ORTHOPAEDIC SURGERY

## 2022-05-16 PROCEDURE — 3051F HG A1C>EQUAL 7.0%<8.0%: CPT | Mod: CPTII,S$GLB,, | Performed by: ORTHOPAEDIC SURGERY

## 2022-05-16 PROCEDURE — 99999 PR PBB SHADOW E&M-EST. PATIENT-LVL IV: CPT | Mod: PBBFAC,,, | Performed by: ORTHOPAEDIC SURGERY

## 2022-05-16 NOTE — PROGRESS NOTES
Chief Complaint   Patient presents with    Right Shoulder - Pain        HPI (10/5/21): Driss Hernandez Jr. is a 71 y.o. male who presents today complaining of right shoulder pain  Duration of symptoms:  About 6 months  Initially treated for cervical spine issues - no relief, patient believes pain is coming from the shoulder   Trauma or new activity: no  Pain is constant with progressive worsening throughout the day   Aggravating factors: has pain after activity  Does not have pain with reaching overhead, reaching behind his back etc  Has been doing some post melissa construction and he does not find this bothers him too much. Notes the pain more when he is at rest after work   First few months of pain he would feel better if he put his arm up over his head   Relieving factors: rest   Night pain is present and is disruptive to sleep  Radicular symptoms: no numbness, paresthesias   Pain radiates down arm but does not go past the elbow  Pain is localized to anterior shoulder   Prior treatment:  prescription NSAIDs (celebrex) with improvement in pain with doubled dose (done with ok of his PCP)  Pain does interfere with activities of daily living .    10/14/21  Still having pain   Says he lifted a 400 lb cabinet yesterday  He thinks his issues are correlated with coumadin use and multiple sticks for INR levels    1/10/22  Still having pain  Disruptive to sleep   Can sleep some with tizanidine   Not nearly as bad during the day   Only about 4-5 days of relief with the last injection   Therapy has been scheduled inconsistently   Was not compliant with HEP   Still doing a lot of heavylifting   Does not believe therapy will help, we have had this discussion multiple times and I have not be able to convince him otherwise   Reports biceps pain  As well as subdeltoid fossa pain, pain with reaching out in front and overhead     5/16/22  Continued in the symptoms.  The tear to schedule surgery  Injection relieved pain  "temporarily     This is the extent of the patient's complaints at this time.     Hand dominance: Right    Review of Systems   All other systems reviewed and are negative.        Review of patient's allergies indicates:   Allergen Reactions    Penicillins Other (See Comments)     Unknown reaction, Had a reaction as a child    Shrimp Itching     Hand itching         Physical Exam:   Vitals:    05/16/22 0912   BP: 122/60   Pulse: (!) 58   Resp: 18   SpO2: 97%   Weight: 87.2 kg (192 lb 3.9 oz)   Height: 5' 7" (1.702 m)   PainSc:   5   PainLoc: Shoulder       General: Weight: 87.2 kg (192 lb 3.9 oz) Body mass index is 30.11 kg/m².  Patient is alert, awake and oriented to time, place and person. Mood and affect are appropriate.  Patient does not appear to be in any distress, denies any constitutional symptoms and appears stated age.   HEENT: Pupils are equal and round, sclera are not injected. External examination of ears and nose reveals no abnormalities. Cranial nerves II-X are grossly intact  Skin: no rashes, abrasions or open wounds on the affected extremity   Resp: No respiratory distress or audible wheezing   CV: 2+ pulses, all extremities warm and well perfused   Right Shoulder    Shoulder Range of Motion    Right     Left   (Active/Passive)       Forward Elevation     165/165            165/165  External rotation (arm at side)  45/45             45/45   Internal rotation behind the back  L5             L5     Range of motion is painful     Scapular winging no  Scapular dyskinesia yes    Acromioclavicular joint is tender  Crossbody test: negative    Neer's positive  Hawkin's positive    Donna's positive  Drop arm negative  Belly press negative      Cuff Strength     Right     Left   Supraspinatus        4/5    5/5  Infraspinatus     5/5    5/5  Subscapularis     5/5    5/5    Deltoid testing            5/5    5/5    Speeds negative  Yergasons negative    Elbow examination demonstrates no tenderness to palpation " and has normal range of motion.     ltsi C5-T1  + epl, io, fds, fdp   2+ RP      Imaging: 3 views of the right shoulder:  positive for degenerative changes of the AC joint. The humeral head is well centered on the AP and axillary views.  There is calcification and cystic changes at the rotator cuff insertion on the greater tuberosity. There is not significant degenerative change of the glenohumeral joint or posterior subluxation of the humeral head. No acute changes or fracture.      MRI: partial tear of supra and infra     I personally reviewed and interpreted the patient's imaging obtained today in clinic     Assessment: 71 y.o. male with right rotator cuff tear, failed non op treatment    I explained my diagnostic impression and the reasoning behind it in detail, using layman's terms.  Models and/or pictures were used to help in the explanation.  We discussed non operative and operative treatment modalities     The spectrum of treatment options were discussed with the patient, including nonoperative and operative options.  After thorough discussion, the patient has elected to undergo surgical treatment to include:    right shoulder arthroscopy with arthroscopic biceps tenodesis, rotator cuff repair, possible Regeneten    We have discussed the surgery and anticipated recovery.  He understands that there may be limited mobility up to several weeks after surgery depending on procedures that are performed at the time of surgery.    The details of the surgical procedure were explained, including the location of probable incisions and a description of likely hardware and/or grafts to be used.  The patient understands the likely convalescence after surgery, in particular the expected postop rehab and recovery course. Alternatives both operative and non-operative with associated risks and benefits discussed. The outlined risks and potential complications of the proposed procedure include but are not limited to: bleeding,  infection, vessel and/or nerve damage, pain, numbness, tingling, compartment syndrome, need for additional surgery, failure to return to pre-injury and/or preoperative functional status, inability to return to work, scar sensitivity, delayed healing, complex regional pain syndrome, weakness, partial and/or incomplete relief of symptoms, persistence of and/or worsening of symptoms, hardware and/or surgical failure, prominent and/or symptomatic hardware possibly necessitating future removal, loss of function, stiffness, functional debility, dysfunction, need for prolonged postoperative rehabilitation, deep venous thrombosis, pulmonary embolism, arthritis and death.  The patient states an understanding and wishes to proceed with surgery.   All questions were answered.  No guarantees were implied or stated.  Written informed consent was obtained.    He was also informed and understands the risks of surgery are greater for patients with a current condition or history of heart disease, obesity, clotting disorders, recurrent infections, steroid use, current or past smoking, and factors such as sedentary lifestyle and noncompliance with medications, therapy or follow-up. The degree of the increased risk is hard to estimate with any degree of precision.    All questions were answered. The patient has verbalized understanding of these issues and wishes to proceed as discussed.   Will proceed with medical clearance.      All questions were answered in detail. The patient is in full agreement with the treatment plan and will proceed accordingly.      This note was created by combination of typed  and M-Modal dictation. Transcription and phonetic errors may be present.  If there are any questions, please contact me.      Current Outpatient Medications:     ACCU-CHEK MOIZ CONTROL JONATHAN Ladd, , Disp: , Rfl:     ACCU-CHEK MOIZ PLUS TEST STRP Strp, TEST THREE TIMES DAILY, Disp: 300 strip, Rfl: 3    ACCU-CHEK SOFTCLIX  "LANCETS Misc, , Disp: , Rfl:     albuterol (PROVENTIL/VENTOLIN HFA) 90 mcg/actuation inhaler, Inhale 2 puffs into the lungs every 4 (four) hours as needed for Shortness of Breath. Rescue, Disp: 17 g, Rfl: 3    amLODIPine (NORVASC) 5 MG tablet, Take 1 tablet (5 mg total) by mouth once daily., Disp: 90 tablet, Rfl: 3    apixaban (ELIQUIS) 5 mg Tab, Take 1 tablet (5 mg total) by mouth 2 (two) times daily., Disp: 180 tablet, Rfl: 3    ascorbic acid, vitamin C, (VITAMIN C) 100 MG tablet, Take 100 mg by mouth daily as needed., Disp: , Rfl:     atorvastatin (LIPITOR) 80 MG tablet, Take 1 tablet (80 mg total) by mouth every evening., Disp: 90 tablet, Rfl: 3    b complex vitamins tablet, Take 1 tablet by mouth once daily., Disp: , Rfl:     BD ALCOHOL SWABS PadM, , Disp: , Rfl:     BD ULTRA-FINE ROLAND PEN NEEDLES 32 gauge x 5/32" Ndle, , Disp: , Rfl:     celecoxib (CELEBREX) 100 MG capsule, Take 1 capsule (100 mg total) by mouth once daily., Disp: 30 capsule, Rfl: 6    clopidogreL (PLAVIX) 75 mg tablet, Take 1 tablet (75 mg total) by mouth once daily., Disp: 90 tablet, Rfl: 3    ergocalciferol (ERGOCALCIFEROL) 50,000 unit Cap, TAKE ONE CAPSULE BY MOUTH WEEKLY, Disp: 12 capsule, Rfl: 2    fenofibrate 160 MG Tab, TAKE 1 TABLET EVERY MORNING, Disp: 90 tablet, Rfl: 4    furosemide (LASIX) 20 MG tablet, Take 1 tablet (20 mg total) by mouth 2 (two) times daily., Disp: 180 tablet, Rfl: 3    gabapentin (NEURONTIN) 100 MG capsule, TAKE 2 CAPSULES IN THE MORNING AND 3 CAPSULES WITH DINNER, Disp: 450 capsule, Rfl: 0    glimepiride (AMARYL) 2 MG tablet, TAKE 1 TABLET EVERY DAY BEFORE BREAKFAST, Disp: 90 tablet, Rfl: 2    hydrALAZINE (APRESOLINE) 50 MG tablet, Take 1 tablet (50 mg total) by mouth 2 (two) times a day., Disp: 180 tablet, Rfl: 3    insulin degludec (TRESIBA FLEXTOUCH U-100) 100 unit/mL (3 mL) insulin pen, Inject 30 Units into the skin once daily., Disp: , Rfl:     isosorbide mononitrate (IMDUR) 120 MG 24 hr " tablet, Take 2 tablets (240 mg total) by mouth once daily., Disp: 180 tablet, Rfl: 3    magnesium 250 mg Tab, Take 1 tablet by mouth once daily. , Disp: , Rfl:     metFORMIN (GLUCOPHAGE-XR) 500 MG ER 24hr tablet, Take 2 tablets twice daily with food, Disp: 360 tablet, Rfl: 2    nitroGLYCERIN (NITROSTAT) 0.4 MG SL tablet, Place 1 tablet (0.4 mg total) under the tongue every 5 (five) minutes as needed for Chest pain., Disp: 25 tablet, Rfl: 11    omega-3 acid ethyl esters (LOVAZA) 1 gram capsule, Take 2 capsules (2 g total) by mouth 2 (two) times daily., Disp: 120 capsule, Rfl: 12    semaglutide (OZEMPIC) 1 mg/dose (4 mg/3 mL), Inject 1 mg into the skin every 7 days., Disp: 1 pen, Rfl: 11    tiZANidine (ZANAFLEX) 4 MG tablet, Take 4 mg by mouth every 6 (six) hours as needed., Disp: , Rfl:     triazolam (HALCION) 0.25 MG Tab, TK 1 T PO QHS, prn, Disp: 30 tablet, Rfl: 3    pantoprazole (PROTONIX) 40 MG tablet, Take 1 tablet (40 mg total) by mouth once daily., Disp: 90 tablet, Rfl: 3  No current facility-administered medications for this visit.    Facility-Administered Medications Ordered in Other Visits:     cyclopentolate 1% ophthalmic solution 1 drop, 1 drop, Left Eye, On Call Procedure, Nickolas Hong MD, 1 drop at 02/24/22 0901    ofloxacin 0.3 % ophthalmic solution 1 drop, 1 drop, Left Eye, On Call Procedure, Nickolas Hong MD, 2 drop at 02/24/22 1017    sodium chloride 0.9% flush 10 mL, 10 mL, Intravenous, PRN, Nickolas Hong MD    Past Medical History:   Diagnosis Date    Chronic midline low back pain without sciatica 10/2/2017    Colon polyps     Coronary artery disease involving native coronary artery of native heart 3/5/2020    Coronary artery disease involving native coronary artery of native heart with angina pectoris 12/10/2020    Diabetes mellitus with neurological manifestations, uncontrolled 1/24/2017    Diabetic polyneuropathy associated with type 2 diabetes  "mellitus 1/24/2017    Diabetic polyneuropathy associated with type 2 diabetes mellitus     Essential hypertension 1/24/2017    Gastroesophageal reflux disease 1/24/2017    Hyperlipidemia 1/24/2017    Insomnia 1/24/2017    Long-term insulin use 1/24/2017    Longstanding persistent atrial fibrillation 12/30/2020    Lumbar spondylosis 11/13/2017    Nuclear sclerosis of both eyes 8/24/2017    Obesity     PAD (peripheral artery disease) 3/23/2022    Stage 3 chronic kidney disease 2/13/2019    Uncontrolled type 2 diabetes mellitus without complication, with long-term current use of insulin 1/24/2017       Active Problem List with Overview Notes    Diagnosis Date Noted    Atherosclerosis of both carotid arteries 03/23/2022    PAD (peripheral artery disease) 03/23/2022    Nuclear sclerotic cataract of left eye 02/24/2022    Weakness of shoulder 11/15/2021    Winging of scapula 11/15/2021    Impaired mobility and ADLs 11/15/2021    Cervical radiculopathy 06/04/2021    Cervical spondylosis 06/04/2021    DDD (degenerative disc disease), cervical 06/04/2021    Long term (current) use of anticoagulants 12/31/2020    Longstanding persistent atrial fibrillation 12/30/2020    Coronary artery disease involving coronary bypass graft of native heart with unstable angina pectoris 12/10/2020    Pre-op testing 11/11/2020    Sedative dependence 08/18/2020    Aortic atherosclerosis 03/11/2020     "Moderate atherosclerosis is seen within the distal abdominal aorta" CTA Chest Non Coronary 10-      Chest pain, atypical 03/05/2020    Hx of CABG 03/05/2020     3 vessel WJ 2016      Coronary artery disease involving native coronary artery of native heart 03/05/2020    Diabetes mellitus due to underlying condition with stage 3 chronic kidney disease, with long-term current use of insulin 11/15/2019    Bilateral sacroiliitis 02/13/2019    Stage 3a chronic kidney disease 02/13/2019    Lumbosacral " spondylosis 11/28/2018    Obesity (BMI 30.0-34.9) 04/26/2018    Pseudophakia 04/12/2018    Lumbar radiculopathy 11/29/2017    Lumbar spondylosis 11/13/2017    DDD (degenerative disc disease), lumbar 11/13/2017    Postlaminectomy syndrome of lumbar region 11/13/2017    Lumbar radiculopathy, right 11/13/2017    Chronic midline low back pain without sciatica 10/02/2017    Senile nuclear sclerosis 08/29/2017    Nuclear sclerosis, left 08/24/2017    Posterior subcapsular age-related cataract of both eyes 08/24/2017    Refractive error 08/24/2017    Chronic right shoulder pain 08/22/2017    Decreased strength of upper extremity 08/22/2017    Decreased range of motion (ROM) of shoulder 08/22/2017    Posture imbalance 08/22/2017    DM type 2 without retinopathy 01/24/2017    Diabetes mellitus with neurological manifestations, uncontrolled 01/24/2017    Insomnia 01/24/2017    Gastroesophageal reflux disease 01/24/2017    Long-term insulin use 01/24/2017    Mixed hyperlipidemia 01/24/2017    Essential hypertension 01/24/2017    Diabetic polyneuropathy associated with type 2 diabetes mellitus 01/24/2017       Past Surgical History:   Procedure Laterality Date    BACK SURGERY      2002    CATARACT EXTRACTION W/  INTRAOCULAR LENS IMPLANT Right 08/29/2017    Dr. Hong    CATARACT EXTRACTION W/  INTRAOCULAR LENS IMPLANT Left 02/24/2022    Dr. Hong    COLONOSCOPY N/A 2/21/2017    Procedure: COLONOSCOPY;  Surgeon: Robb Rosado MD;  Location: [facility] ENDO;  Service: Endoscopy;  Laterality: N/A;    CORONARY ANGIOGRAPHY INCLUDING BYPASS GRAFTS WITH CATHETERIZATION OF LEFT HEART N/A 11/11/2020    Procedure: ANGIOGRAM, CORONARY, INCLUDING BYPASS GRAFT, WITH LEFT HEART CATHETERIZATION;  Surgeon: Lane Cuellar MD;  Location: [facility] CATH LAB;  Service: Cardiology;  Laterality: N/A;  RN PRE OP 11-5-2020. --COVID NEGATIVE ON  11-. C A    CORONARY ARTERY BYPASS GRAFT      March 2016     EPIDURAL STEROID INJECTION Bilateral 11/14/2018    Procedure: Lumbar Medial Branch Blocks;  Surgeon: Eze Byrd Jr., MD;  Location: [facility] ENDO;  Service: Pain Management;  Laterality: Bilateral;  Bilateral Lumbar Medial Branch Blocks L2, L3, L4, L5    [id number]  [id number]    Arrive @ 1150; NO Sedation    EPIDURAL STEROID INJECTION Bilateral 11/28/2018    Procedure: Injection, Steroid, Epidural;  Surgeon: Eze Byrd Jr., MD;  Location: [facility] ENDO;  Service: Pain Management;  Laterality: Bilateral;  Bilateral Sacroiliac Joint Steroid Injections    [id number]    Arrive @ 0910    EPIDURAL STEROID INJECTION Bilateral 3/20/2019    Procedure: Injection, Steroid, Sacroiliiac Joint;  Surgeon: Eze Byrd Jr., MD;  Location: [facility] ENDO;  Service: Pain Management;  Laterality: Bilateral;  Bilateral Sacroiliac Joint Steroid Injections    [id number]    Arrive @ 1145; Trigger point injections also?    INTRAOCULAR PROSTHESES INSERTION Left 2/24/2022    Procedure: INSERTION, IOL PROSTHESIS;  Surgeon: Nickolas Hong MD;  Location: [facility] OR;  Service: Ophthalmology;  Laterality: Left;    PHACOEMULSIFICATION OF CATARACT Left 2/24/2022    Procedure: PHACOEMULSIFICATION, CATARACT;  Surgeon: Nickolas Hong MD;  Location: [facility] OR;  Service: Ophthalmology;  Laterality: Left;  RN Phone Pre Op 2-16-22.  Covid NEGATIVE  2-23-22.  Arrival 08:00 am.    TONSILLECTOMY         Social History     Socioeconomic History    Marital status:    Tobacco Use    Smoking status: Never Smoker    Smokeless tobacco: Never Used   Substance and Sexual Activity    Alcohol use: Yes     Comment: SOCIALLY    Drug use: No    Sexual activity: Yes     Partners: Female     Social Determinants of Health     Financial Resource Strain: Low Risk     Difficulty of Paying Living Expenses: Not hard at all   Food Insecurity: No Food Insecurity    Worried About Running Out of Food in the Last Year: Never true    Ran Out of Food in the  Last Year: Never true   Transportation Needs: No Transportation Needs    Lack of Transportation (Medical): No    Lack of Transportation (Non-Medical): No   Physical Activity: Insufficiently Active    Days of Exercise per Week: 3 days    Minutes of Exercise per Session: 30 min   Stress: No Stress Concern Present    Feeling of Stress : Only a little   Social Connections: Unknown    Frequency of Communication with Friends and Family: More than three times a week    Frequency of Social Gatherings with Friends and Family: More than three times a week    Active Member of Clubs or Organizations: Yes    Attends Club or Organization Meetings: More than 4 times per year    Marital Status:    Housing Stability: Low Risk     Unable to Pay for Housing in the Last Year: No    Number of Places Lived in the Last Year: 1    Unstable Housing in the Last Year: No

## 2022-05-17 ENCOUNTER — OFFICE VISIT (OUTPATIENT)
Dept: PAIN MEDICINE | Facility: CLINIC | Age: 72
End: 2022-05-17
Payer: MEDICARE

## 2022-05-17 VITALS
SYSTOLIC BLOOD PRESSURE: 143 MMHG | WEIGHT: 191.13 LBS | TEMPERATURE: 98 F | HEIGHT: 67 IN | DIASTOLIC BLOOD PRESSURE: 65 MMHG | BODY MASS INDEX: 30 KG/M2 | HEART RATE: 57 BPM

## 2022-05-17 DIAGNOSIS — M51.36 DDD (DEGENERATIVE DISC DISEASE), LUMBAR: ICD-10-CM

## 2022-05-17 DIAGNOSIS — M25.511 CHRONIC RIGHT SHOULDER PAIN: ICD-10-CM

## 2022-05-17 DIAGNOSIS — M54.12 CERVICAL RADICULOPATHY: ICD-10-CM

## 2022-05-17 DIAGNOSIS — M79.18 MYOFASCIAL PAIN: ICD-10-CM

## 2022-05-17 DIAGNOSIS — G89.29 CHRONIC RIGHT SHOULDER PAIN: ICD-10-CM

## 2022-05-17 DIAGNOSIS — M96.1 POSTLAMINECTOMY SYNDROME OF LUMBAR REGION: Primary | ICD-10-CM

## 2022-05-17 DIAGNOSIS — M47.816 LUMBAR SPONDYLOSIS: ICD-10-CM

## 2022-05-17 PROCEDURE — 1101F PR PT FALLS ASSESS DOC 0-1 FALLS W/OUT INJ PAST YR: ICD-10-PCS | Mod: CPTII,S$GLB,, | Performed by: NURSE PRACTITIONER

## 2022-05-17 PROCEDURE — 3051F HG A1C>EQUAL 7.0%<8.0%: CPT | Mod: CPTII,S$GLB,, | Performed by: NURSE PRACTITIONER

## 2022-05-17 PROCEDURE — 3072F LOW RISK FOR RETINOPATHY: CPT | Mod: CPTII,S$GLB,, | Performed by: NURSE PRACTITIONER

## 2022-05-17 PROCEDURE — 99999 PR PBB SHADOW E&M-EST. PATIENT-LVL V: ICD-10-PCS | Mod: PBBFAC,,, | Performed by: NURSE PRACTITIONER

## 2022-05-17 PROCEDURE — 3008F BODY MASS INDEX DOCD: CPT | Mod: CPTII,S$GLB,, | Performed by: NURSE PRACTITIONER

## 2022-05-17 PROCEDURE — 1159F MED LIST DOCD IN RCRD: CPT | Mod: CPTII,S$GLB,, | Performed by: NURSE PRACTITIONER

## 2022-05-17 PROCEDURE — 3060F PR POS MICROALBUMINURIA RESULT DOCUMENTED/REVIEW: ICD-10-PCS | Mod: CPTII,S$GLB,, | Performed by: NURSE PRACTITIONER

## 2022-05-17 PROCEDURE — 3066F PR DOCUMENTATION OF TREATMENT FOR NEPHROPATHY: ICD-10-PCS | Mod: CPTII,S$GLB,, | Performed by: NURSE PRACTITIONER

## 2022-05-17 PROCEDURE — 3078F DIAST BP <80 MM HG: CPT | Mod: CPTII,S$GLB,, | Performed by: NURSE PRACTITIONER

## 2022-05-17 PROCEDURE — 99999 PR PBB SHADOW E&M-EST. PATIENT-LVL V: CPT | Mod: PBBFAC,,, | Performed by: NURSE PRACTITIONER

## 2022-05-17 PROCEDURE — 1125F AMNT PAIN NOTED PAIN PRSNT: CPT | Mod: CPTII,S$GLB,, | Performed by: NURSE PRACTITIONER

## 2022-05-17 PROCEDURE — 3051F PR MOST RECENT HEMOGLOBIN A1C LEVEL 7.0 - < 8.0%: ICD-10-PCS | Mod: CPTII,S$GLB,, | Performed by: NURSE PRACTITIONER

## 2022-05-17 PROCEDURE — 3077F PR MOST RECENT SYSTOLIC BLOOD PRESSURE >= 140 MM HG: ICD-10-PCS | Mod: CPTII,S$GLB,, | Performed by: NURSE PRACTITIONER

## 2022-05-17 PROCEDURE — 3066F NEPHROPATHY DOC TX: CPT | Mod: CPTII,S$GLB,, | Performed by: NURSE PRACTITIONER

## 2022-05-17 PROCEDURE — 20553 NJX 1/MLT TRIGGER POINTS 3/>: CPT | Mod: S$GLB,,, | Performed by: NURSE PRACTITIONER

## 2022-05-17 PROCEDURE — 1160F RVW MEDS BY RX/DR IN RCRD: CPT | Mod: CPTII,S$GLB,, | Performed by: NURSE PRACTITIONER

## 2022-05-17 PROCEDURE — 1160F PR REVIEW ALL MEDS BY PRESCRIBER/CLIN PHARMACIST DOCUMENTED: ICD-10-PCS | Mod: CPTII,S$GLB,, | Performed by: NURSE PRACTITIONER

## 2022-05-17 PROCEDURE — 3078F PR MOST RECENT DIASTOLIC BLOOD PRESSURE < 80 MM HG: ICD-10-PCS | Mod: CPTII,S$GLB,, | Performed by: NURSE PRACTITIONER

## 2022-05-17 PROCEDURE — 3288F PR FALLS RISK ASSESSMENT DOCUMENTED: ICD-10-PCS | Mod: CPTII,S$GLB,, | Performed by: NURSE PRACTITIONER

## 2022-05-17 PROCEDURE — 3008F PR BODY MASS INDEX (BMI) DOCUMENTED: ICD-10-PCS | Mod: CPTII,S$GLB,, | Performed by: NURSE PRACTITIONER

## 2022-05-17 PROCEDURE — 99213 OFFICE O/P EST LOW 20 MIN: CPT | Mod: 25,S$GLB,, | Performed by: NURSE PRACTITIONER

## 2022-05-17 PROCEDURE — 3060F POS MICROALBUMINURIA REV: CPT | Mod: CPTII,S$GLB,, | Performed by: NURSE PRACTITIONER

## 2022-05-17 PROCEDURE — 1159F PR MEDICATION LIST DOCUMENTED IN MEDICAL RECORD: ICD-10-PCS | Mod: CPTII,S$GLB,, | Performed by: NURSE PRACTITIONER

## 2022-05-17 PROCEDURE — 3072F PR LOW RISK FOR RETINOPATHY: ICD-10-PCS | Mod: CPTII,S$GLB,, | Performed by: NURSE PRACTITIONER

## 2022-05-17 PROCEDURE — 3077F SYST BP >= 140 MM HG: CPT | Mod: CPTII,S$GLB,, | Performed by: NURSE PRACTITIONER

## 2022-05-17 PROCEDURE — 99213 PR OFFICE/OUTPT VISIT, EST, LEVL III, 20-29 MIN: ICD-10-PCS | Mod: 25,S$GLB,, | Performed by: NURSE PRACTITIONER

## 2022-05-17 PROCEDURE — 1125F PR PAIN SEVERITY QUANTIFIED, PAIN PRESENT: ICD-10-PCS | Mod: CPTII,S$GLB,, | Performed by: NURSE PRACTITIONER

## 2022-05-17 PROCEDURE — 20553 PR INJECT TRIGGER POINTS, > 3: ICD-10-PCS | Mod: S$GLB,,, | Performed by: NURSE PRACTITIONER

## 2022-05-17 PROCEDURE — 1101F PT FALLS ASSESS-DOCD LE1/YR: CPT | Mod: CPTII,S$GLB,, | Performed by: NURSE PRACTITIONER

## 2022-05-17 PROCEDURE — 3288F FALL RISK ASSESSMENT DOCD: CPT | Mod: CPTII,S$GLB,, | Performed by: NURSE PRACTITIONER

## 2022-05-17 RX ORDER — SODIUM CHLORIDE, SODIUM LACTATE, POTASSIUM CHLORIDE, CALCIUM CHLORIDE 600; 310; 30; 20 MG/100ML; MG/100ML; MG/100ML; MG/100ML
INJECTION, SOLUTION INTRAVENOUS
COMMUNITY
Start: 2022-02-24 | End: 2022-06-28 | Stop reason: CLARIF

## 2022-05-17 RX ORDER — BUPIVACAINE HYDROCHLORIDE 5 MG/ML
9 INJECTION, SOLUTION EPIDURAL; INTRACAUDAL
Status: COMPLETED | OUTPATIENT
Start: 2022-05-17 | End: 2022-05-17

## 2022-05-17 RX ORDER — FENTANYL CITRATE 50 UG/ML
INJECTION, SOLUTION INTRAMUSCULAR; INTRAVENOUS
COMMUNITY
Start: 2022-02-24 | End: 2022-06-28 | Stop reason: CLARIF

## 2022-05-17 RX ORDER — BETAMETHASONE SODIUM PHOSPHATE AND BETAMETHASONE ACETATE 3; 3 MG/ML; MG/ML
6 INJECTION, SUSPENSION INTRA-ARTICULAR; INTRALESIONAL; INTRAMUSCULAR; SOFT TISSUE
Status: COMPLETED | OUTPATIENT
Start: 2022-05-17 | End: 2022-05-17

## 2022-05-17 RX ORDER — DUREZOL 0.5 MG/ML
EMULSION OPHTHALMIC
COMMUNITY
Start: 2022-01-21 | End: 2022-06-28 | Stop reason: CLARIF

## 2022-05-17 RX ORDER — PHENYLEPHRINE AND KETOROLAC 10.16; 2.88 MG/ML; MG/ML
INJECTION, SOLUTION, CONCENTRATE INTRAOCULAR
COMMUNITY
Start: 2022-02-24 | End: 2022-06-28 | Stop reason: CLARIF

## 2022-05-17 RX ADMIN — BUPIVACAINE HYDROCHLORIDE 45 MG: 5 INJECTION, SOLUTION EPIDURAL; INTRACAUDAL at 09:05

## 2022-05-17 RX ADMIN — BETAMETHASONE SODIUM PHOSPHATE AND BETAMETHASONE ACETATE 6 MG: 3; 3 INJECTION, SUSPENSION INTRA-ARTICULAR; INTRALESIONAL; INTRAMUSCULAR; SOFT TISSUE at 09:05

## 2022-05-17 NOTE — PROGRESS NOTES
Subjective:     Patient ID: Driss Hernandez Jr. is a 71 y.o. male    Chief Complaint: Back Pain (Back pain to left leg)      Referred by: No ref. provider found      HPI:    Interval History NP (5/17/2022):  Mr Hernandez presents for delayed FU. Returning lower back pain where TPIs have done well in past and requesting repeat. He will be having shoulder surgery upcoming. Denies new areas of pain or neurological changes. No focal voicing of  myelopathic symptoms such as handwriting changes or difficulty with buttons/coins/keys. Denies perineal paresthesias, bowel/bladder dysfunction. Currently taking neurontin and zanaflex being taken minimally at this time.       Interval History (9/22/21):  He returns today for follow up.  He reports that TPIs done at last visit have been helpful. His low back is doing well and does not need any further attention at this time. He does continue to have intermittent severe right shoulder pain. He denies any changes in the quality or location of this pain since last encounter. He denies any new or worsened symptoms.       Interval History (8/19/21):  He returns today for follow up.  He reports that physical therapy has been helpful for the right neck and shoulder pain.  He states that his neck pain was never very severe and has now essentially resolved.  He states that the vast majority of his pain is located right anterior shoulder.  The pain is exacerbated with certain movements and is particularly bothersome while sleeping.  He denies any associated numbness tingling with right shoulder pain.  Patient also states that he has recurrent low back pain.  Similar in quality and location to previous back pain that was well treated with trigger point injections.  He would like repeat trigger point injections done today      Interval History (6/4/21):  He returns today for follow up.  He reports that he has had right-sided neck and upper extremity pain for the past 2 weeks.  He states  the pain started after handing his wife a heavy flower pot.  The pain is located the right lower cervical paraspinal region radiate to the right wrist with associated numbness and tingling in the fingers of the right hand.  He denies any focal weakness or bowel bladder dysfunction.  The pain is constant worse with activity.  The pain disrupts his sleep.       Interval History (1/6/20):  He returns today for follow up.  He reports that lumbar trigger point injections have been helpful for the bilateral low back pain. He reports greater than 85% relief for over 9 months.  He states the pain has returned.  He denies any changes in the quality or location was pain. He would like repeat trigger point injections today.      Interval History (3/18/19):  He returns today for follow up.  He reports that trigger point injections have been helpful for the right-sided low back pain. He reports near complete resolution of the sharp right-sided low back pain. He does report that his previous low back pain starting to return.  He feels as though his current pain is exact same pain that was previously helped by sacroiliac joint injections.  He is interested in repeat injections if appropriate.      Interval History (2/12/19):  He returns today for follow up and imaging review.  He reports that his pain is unchanged since last encounter.  He denies any new or worsening neurologic symptoms.      Interval History (2/7/19):  He returns today for follow up.  He reports that he has been having acute right-sided low back pain for about 1 week.  He denies any specific inciting event or injury.  The pain is located in the lateral aspect of the right lumbosacral region.  It does not radiate.  He denies any associated numbness, tingling, weakness, bowel bladder dysfunction.  The pain varies in intensity from day-to-day.  The pain is much worse with coughing and sneezing and sitting with a forward flexed posture.  He is still able to go to the  gym at times.      Interval History (12/17/18):  He returns today for follow up.  He reports that bilateral SIJ injections has been helpful for the low back pain. He reports about 80% relief. He does not feel that he needs any further interventions at this time      Interval History (11/19/18):  He returns today for follow up.  He reports that bilateral lumbar medial branch blocks were not helpful. Pain is unchanged in quality and location since last visit. He denies any new symptoms.       Interval History (10/16/18):  He returns today for follow up.  He reports that he has continued to have bilateral low back pain. He still has some right lower extremity pain, but this is not as bothersome as his low back pain. The pain is located across the lower lumbar region R>L. It does not radiate. It is not associated with any numbness, tingling, weakness or b/b dysfunction. The pain is worse with activity. He also requests refills of tizanidine.      Interval History (12/18/17):  He returns today for follow up.  He reports that right L4 TFESI has been helpful for the right lumbar radicular pain. He reports 100% relief. He still has some lower back pain, but would prefer to discuss this later. Otherwise he is doing well.       Interval History (11/13/17):  He returns today for follow up.  He reports that PT has been helpful for the low back pain. He still has significant right foot and ankle pain when sitting in a reclined postion. This is the most painful area. He also has low back pain that is constant but does not bother him as much. Otherwise he is doing well.       Driss Jasonlaila Hernandez Jr. is a 71 y.o. male who presents today with chronic bilateral low back pain R>>>L. Pain started over 40 years ago. No inciting injury or event noted. Pain is located mainly on the right side of the lower lumbar paraspinals. About 5 weeks ago, patient began to have right lwoer extremity pain that he felt was related to his back pain, but  this has subsided. He denies any numbness, tingling, weakness, or b/b dysfunction. The pain is described as dull, but can become sharp at times. Patient states that his pain is worse in the morning. He does not sleep well. States that he wakes up every 45 minutes. Has taken Tizanidine PRN in the pat, but cannot recall how it affected him. Probably has not taken in over a year. Cannot take NSAIDs due to decreased renal function. Has taken in the past with mild relief.  This pain is described in detail below.    Physical Therapy:  Yes.    Non-pharmacologic Treatment: Nothing really helps         · TENS? No    Pain Medications:         · Currently taking: Gabapentin, Tizandine. Tylenol p.r.n., Celebrex    · Has tried in the past:  NSAIDs    · Has not tried: Opioids, Tylenol, TCAs, SNRIs, topical creams    Blood thinners:  Plavix, Eliquis    Interventional Therapies:   11/29/17 - Right L4 TFESI - 100% relief  10/28/18 - bilateral SIJ injections -  80% relief  11/14/18 - Bilateral L2, L3, L4 and L5 MBBs - No relief noted  3/20/19 - bilateral sacroiliac joint steroid injections    Relevant Surgeries: Previous right L4 hemilaminectomy    Affecting sleep? Yes    Affecting daily activities? yes    Depressive symptoms? no          · SI/HI? No    Work status: Retired    Pain Scores:    Best:       3/10  Worst:     10/10  Usually:   5/10  Today:    5/10    Review of Systems   Constitutional: Negative for activity change, appetite change, chills, fatigue, fever and unexpected weight change.   HENT: Negative for hearing loss.    Eyes: Negative for visual disturbance.   Respiratory: Negative for chest tightness and shortness of breath.    Cardiovascular: Negative for chest pain.   Gastrointestinal: Negative for abdominal pain, constipation, diarrhea, nausea and vomiting.   Genitourinary: Negative for difficulty urinating.   Musculoskeletal: Positive for back pain and myalgias. Negative for gait problem and neck pain.   Skin:  Negative for rash.   Neurological: Negative for dizziness, weakness, light-headedness, numbness and headaches.   Psychiatric/Behavioral: Positive for sleep disturbance. Negative for hallucinations and suicidal ideas. The patient is not nervous/anxious.        Past Medical History:   Diagnosis Date    Chronic midline low back pain without sciatica 10/2/2017    Colon polyps     Coronary artery disease involving native coronary artery of native heart 3/5/2020    Coronary artery disease involving native coronary artery of native heart with angina pectoris 12/10/2020    Diabetes mellitus with neurological manifestations, uncontrolled 1/24/2017    Diabetic polyneuropathy associated with type 2 diabetes mellitus 1/24/2017    Diabetic polyneuropathy associated with type 2 diabetes mellitus     Essential hypertension 1/24/2017    Gastroesophageal reflux disease 1/24/2017    Hyperlipidemia 1/24/2017    Insomnia 1/24/2017    Long-term insulin use 1/24/2017    Longstanding persistent atrial fibrillation 12/30/2020    Lumbar spondylosis 11/13/2017    Nuclear sclerosis of both eyes 8/24/2017    Obesity     PAD (peripheral artery disease) 3/23/2022    Stage 3 chronic kidney disease 2/13/2019    Uncontrolled type 2 diabetes mellitus without complication, with long-term current use of insulin 1/24/2017       Past Surgical History:   Procedure Laterality Date    BACK SURGERY      2002    CATARACT EXTRACTION W/  INTRAOCULAR LENS IMPLANT Right 08/29/2017    Dr. Hong    CATARACT EXTRACTION W/  INTRAOCULAR LENS IMPLANT Left 02/24/2022    Dr. Hong    COLONOSCOPY N/A 2/21/2017    Procedure: COLONOSCOPY;  Surgeon: Robb Rosado MD;  Location: [facility];  Service: Endoscopy;  Laterality: N/A;    CORONARY ANGIOGRAPHY INCLUDING BYPASS GRAFTS WITH CATHETERIZATION OF LEFT HEART N/A 11/11/2020    Procedure: ANGIOGRAM, CORONARY, INCLUDING BYPASS GRAFT, WITH LEFT HEART CATHETERIZATION;  Surgeon: aLne DAMON  MD Polo;  Location: [facility] CATH LAB;  Service: Cardiology;  Laterality: N/A;  RN PRE OP 11-5-2020. --COVID NEGATIVE ON  11-. C A    CORONARY ARTERY BYPASS GRAFT      March 2016    EPIDURAL STEROID INJECTION Bilateral 11/14/2018    Procedure: Lumbar Medial Branch Blocks;  Surgeon: Eze Byrd Jr., MD;  Location: [facility] ENDO;  Service: Pain Management;  Laterality: Bilateral;  Bilateral Lumbar Medial Branch Blocks L2, L3, L4, L5    [id number]  [id number]    Arrive @ 1150; NO Sedation    EPIDURAL STEROID INJECTION Bilateral 11/28/2018    Procedure: Injection, Steroid, Epidural;  Surgeon: Eze Byrd Jr., MD;  Location: [facility] ENDO;  Service: Pain Management;  Laterality: Bilateral;  Bilateral Sacroiliac Joint Steroid Injections    [id number]    Arrive @ 0910    EPIDURAL STEROID INJECTION Bilateral 3/20/2019    Procedure: Injection, Steroid, Sacroiliiac Joint;  Surgeon: Eze Byrd Jr., MD;  Location: [facility] ENDO;  Service: Pain Management;  Laterality: Bilateral;  Bilateral Sacroiliac Joint Steroid Injections    [id number]    Arrive @ 1145; Trigger point injections also?    INTRAOCULAR PROSTHESES INSERTION Left 2/24/2022    Procedure: INSERTION, IOL PROSTHESIS;  Surgeon: Nickolas Hong MD;  Location: [facility] OR;  Service: Ophthalmology;  Laterality: Left;    PHACOEMULSIFICATION OF CATARACT Left 2/24/2022    Procedure: PHACOEMULSIFICATION, CATARACT;  Surgeon: Nickolas Hong MD;  Location: [facility] OR;  Service: Ophthalmology;  Laterality: Left;  RN Phone Pre Op 2-16-22.  Covid NEGATIVE  2-23-22.  Arrival 08:00 am.    TONSILLECTOMY         Social History     Socioeconomic History    Marital status:    Tobacco Use    Smoking status: Never Smoker    Smokeless tobacco: Never Used   Substance and Sexual Activity    Alcohol use: Yes     Comment: SOCIALLY    Drug use: No    Sexual activity: Yes     Partners: Female     Social Determinants of Health     Financial Resource Strain: Low Risk      Difficulty of Paying Living Expenses: Not hard at all   Food Insecurity: No Food Insecurity    Worried About Running Out of Food in the Last Year: Never true    Ran Out of Food in the Last Year: Never true   Transportation Needs: No Transportation Needs    Lack of Transportation (Medical): No    Lack of Transportation (Non-Medical): No   Physical Activity: Insufficiently Active    Days of Exercise per Week: 3 days    Minutes of Exercise per Session: 30 min   Stress: No Stress Concern Present    Feeling of Stress : Only a little   Social Connections: Unknown    Frequency of Communication with Friends and Family: More than three times a week    Frequency of Social Gatherings with Friends and Family: More than three times a week    Active Member of Clubs or Organizations: Yes    Attends Club or Organization Meetings: More than 4 times per year    Marital Status:    Housing Stability: Low Risk     Unable to Pay for Housing in the Last Year: No    Number of Places Lived in the Last Year: 1    Unstable Housing in the Last Year: No       Review of patient's allergies indicates:   Allergen Reactions    Penicillins Other (See Comments)     Unknown reaction, Had a reaction as a child    Shrimp Itching     Hand itching       Current Outpatient Medications on File Prior to Visit   Medication Sig Dispense Refill    ACCU-CHEK MOIZ CONTROL SOLN Soln       ACCU-CHEK MOIZ PLUS TEST STRP Strp TEST THREE TIMES DAILY 300 strip 3    ACCU-CHEK SOFTCLIX LANCETS Misc       albuterol (PROVENTIL/VENTOLIN HFA) 90 mcg/actuation inhaler Inhale 2 puffs into the lungs every 4 (four) hours as needed for Shortness of Breath. Rescue 17 g 3    amLODIPine (NORVASC) 5 MG tablet Take 1 tablet (5 mg total) by mouth once daily. 90 tablet 3    apixaban (ELIQUIS) 5 mg Tab Take 1 tablet (5 mg total) by mouth 2 (two) times daily. 180 tablet 3    ascorbic acid, vitamin C, (VITAMIN C) 100 MG tablet Take 100 mg by mouth daily as  "needed.      atorvastatin (LIPITOR) 80 MG tablet Take 1 tablet (80 mg total) by mouth every evening. 90 tablet 3    b complex vitamins tablet Take 1 tablet by mouth once daily.      BD ALCOHOL SWABS PadM       BD ULTRA-FINE ROLAND PEN NEEDLES 32 gauge x 5/32" Ndle       celecoxib (CELEBREX) 100 MG capsule Take 1 capsule (100 mg total) by mouth once daily. 30 capsule 6    clopidogreL (PLAVIX) 75 mg tablet Take 1 tablet (75 mg total) by mouth once daily. 90 tablet 3    DUREZOL 0.05 % Drop ophthalmic solution       ergocalciferol (ERGOCALCIFEROL) 50,000 unit Cap TAKE ONE CAPSULE BY MOUTH WEEKLY 12 capsule 2    fenofibrate 160 MG Tab TAKE 1 TABLET EVERY MORNING 90 tablet 4    fentaNYL (SUBLIMAZE) 50 mcg/mL injection       furosemide (LASIX) 20 MG tablet Take 1 tablet (20 mg total) by mouth 2 (two) times daily. 180 tablet 3    gabapentin (NEURONTIN) 100 MG capsule TAKE 2 CAPSULES IN THE MORNING AND 3 CAPSULES WITH DINNER 450 capsule 0    glimepiride (AMARYL) 2 MG tablet TAKE 1 TABLET EVERY DAY BEFORE BREAKFAST 90 tablet 2    hydrALAZINE (APRESOLINE) 50 MG tablet Take 1 tablet (50 mg total) by mouth 2 (two) times a day. 180 tablet 3    insulin degludec (TRESIBA FLEXTOUCH U-100) 100 unit/mL (3 mL) insulin pen Inject 30 Units into the skin once daily.      isosorbide mononitrate (IMDUR) 120 MG 24 hr tablet Take 2 tablets (240 mg total) by mouth once daily. 180 tablet 3    magnesium 250 mg Tab Take 1 tablet by mouth once daily.       metFORMIN (GLUCOPHAGE-XR) 500 MG ER 24hr tablet Take 2 tablets twice daily with food 360 tablet 2    nitroGLYCERIN (NITROSTAT) 0.4 MG SL tablet Place 1 tablet (0.4 mg total) under the tongue every 5 (five) minutes as needed for Chest pain. 25 tablet 11    omega-3 acid ethyl esters (LOVAZA) 1 gram capsule Take 2 capsules (2 g total) by mouth 2 (two) times daily. 120 capsule 12    OMIDRIA 1-0.3 % 500 mL irrigation       Ringer's solution,lactated (LACTATED RINGERS) infusion    " "   semaglutide (OZEMPIC) 1 mg/dose (4 mg/3 mL) Inject 1 mg into the skin every 7 days. 1 pen 11    tiZANidine (ZANAFLEX) 4 MG tablet Take 4 mg by mouth every 6 (six) hours as needed.      triazolam (HALCION) 0.25 MG Tab TK 1 T PO QHS, prn 30 tablet 3    pantoprazole (PROTONIX) 40 MG tablet Take 1 tablet (40 mg total) by mouth once daily. 90 tablet 3     Current Facility-Administered Medications on File Prior to Visit   Medication Dose Route Frequency Provider Last Rate Last Admin    cyclopentolate 1% ophthalmic solution 1 drop  1 drop Left Eye On Call Procedure Nickolas Hong MD   1 drop at 02/24/22 0901    ofloxacin 0.3 % ophthalmic solution 1 drop  1 drop Left Eye On Call Procedure Nickolas Hong MD   2 drop at 02/24/22 1017    sodium chloride 0.9% flush 10 mL  10 mL Intravenous PRN Nickolas Hong MD           Objective:      BP (!) 143/65 (BP Location: Right arm, Patient Position: Sitting, BP Method: Medium (Automatic))   Pulse (!) 57   Temp 98.4 °F (36.9 °C)   Ht 5' 7" (1.702 m)   Wt 86.7 kg (191 lb 1.6 oz)   BMI 29.93 kg/m²     Exam:  GEN:  Well developed, well nourished.  No acute distress.   HEENT:  No trauma.  Mucous membranes moist.  Nares patent bilaterally.  PSYCH: Normal affect. Thought content appropriate.  CHEST:  Breathing symmetric.  No audible wheezing.  ABD: Soft, non-distended.  SKIN:  Warm, pink, dry.  No rash on exposed areas.    EXT:  No cyanosis, clubbing, or edema.  No color change or changes in nail or hair growth.  NEURO/MUSCULOSKELETAL:  Fully alert, oriented, and appropriate. Speech normal ivania. No cranial nerve deficits.   Gait:  Normal.  No focal motor deficits.   Lumbar: paraspinal muscle TTP to left lower lumbar             Imaging:      Narrative & Impression    EXAMINATION:  XR CERVICAL SPINE AP LAT WITH FLEX EXTEN     CLINICAL HISTORY:  Other cervical disc degeneration, unspecified cervical region     TECHNIQUE:  Three views of the cervical " spine plus flexion and extension views were performed.     COMPARISON:  None     FINDINGS:  Cervical spine is normal in alignment, vertebral body heights are maintained.  No displaced fracture or subluxation.  No suspicious bone lesion.     No instability between the flexion and extension images.     Mild and moderate multilevel degenerative disc disease and uncovertebral DJD.  Anterior osteophytosis at levels C2-3, C4-5, and C5-6.  Mild multilevel facet DJD.     Unremarkable soft tissues.     Impression:     No acute abnormality.  No instability.     Mild and moderate multilevel DJD.        Electronically signed by: Marco Vásquez MD  Date:                                            08/18/2021  Time:                                           08:35           Narrative     EXAMINATION:  MRI LUMBAR SPINE WITHOUT CONTRAST    CLINICAL HISTORY:  lumbar ddd; Other intervertebral disc degeneration, lumbar region    TECHNIQUE:  Multiplanar, multisequence MR images were acquired from the thoracolumbar junction to the sacrum without the administration of contrast.    COMPARISON:  08/23/2017    FINDINGS:  There is no evidence of fracture or marrow replacement process.  There is disc desiccation at all lumbar levels with disc space narrowing at multiple levels but most significant at L4-5.  Sagittal alignment is maintained.  Conus terminates at T12-L1.  Visualized retroperitoneal structures demonstrate no significant abnormalities.    T12-L1, L1-L2: Mild diffuse disc bulge with no significant central canal stenosis or neural foraminal narrowing.    L2-L3: Mild diffuse disc bulge with moderate facet arthropathy.  No significant central canal stenosis or neural foraminal narrowing.    L3-4: Mild diffuse disc bulge extending into both neural foramen with moderate facet arthropathy causing moderate right and mild left neural foraminal narrowing.  No significant central canal stenosis.    L4-5: Hemilaminectomy defect on the right.   There is a diffuse disc bulge extending into both neural foramen with a superimposed central extrusion with an annular fissure extending slightly below the level of the disc plane.  This causes mass effect on the lateral recess and may impinge on the descending nerve roots although the canal is decompressed posteriorly by the laminectomy defect with no significant central canal stenosis.  There is severe facet arthropathy contributing to severe bilateral neural foraminal narrowing.    L5-S1: There is severe facet arthropathy.  There is a diffuse disc bulge with no significant central canal stenosis.  There is severe bilateral neural foraminal narrowing..   Impression       Postoperative changes at L4 on the right with decompression of the central canal.  There is lumbar spondylosis most significant at L4-5 and L5-S1 where there is severe bilateral neural foraminal narrowing.  Specific details at each level are discussed above.      Electronically signed by: Quinton Goins MD  Date: 02/12/2019  Time: 10:10         Assessment:       Encounter Diagnoses   Name Primary?    Postlaminectomy syndrome of lumbar region Yes    Myofascial pain     DDD (degenerative disc disease), lumbar     Lumbar spondylosis          Plan:       Driss was seen today for back pain.    Diagnoses and all orders for this visit:    Postlaminectomy syndrome of lumbar region    Myofascial pain    DDD (degenerative disc disease), lumbar    Lumbar spondylosis    Other orders  -     BUPivacaine (PF) 0.5% (5 mg/mL) injection 45 mg  -     betamethasone acetate-betamethasone sodium phosphate injection 6 mg        Driss Christiansonshantal Akbar is a 71 y.o. male with right anterior shoulder pain.  Some concern for right biceps tendinopathy.  Also with recurrent right-sided low back pain.  This has been well treated with trigger point injections.    - Prior records reviewed  - Presented to TPI which was given to left lower lumbar  - Advised to continue Ortho FU  and with surgical intervention  - I have stressed the importance of physical activity and a home exercise plan to help with pain and improve health.  - Patient can continue with medications for now since they are providing benefits, using them appropriately, and without side effects.  - Counseled patient regarding the importance of physical therapy after surgery .  - RTC PRN   .     Trigger Point Injection:   The procedure was discussed with the patient including complications of nerve damage,  bleeding, infection, and failure of pain relief.   Trigger points were identified by palpation and marked. Alcohol prep of sites done. A mixture of 9mL 0.25% bupivacaine +4mg celestone was prepared (10 mL total).   A 27-gauge needle was advanced to the point of maximal tenderness, and medication was injected after negative aspiration. All sites done in the same manner. Patient tolerated the procedure well and without complications. Sites injected included: left lower lumbar paraspinal 4 sites total). The patient was observed for 30 minutes after the procedure.  enies any adverse effects including dizziness or shortness or breath.

## 2022-05-18 ENCOUNTER — TELEPHONE (OUTPATIENT)
Dept: PHARMACY | Facility: CLINIC | Age: 72
End: 2022-05-18
Payer: MEDICARE

## 2022-05-18 ENCOUNTER — OFFICE VISIT (OUTPATIENT)
Dept: ENDOCRINOLOGY | Facility: CLINIC | Age: 72
End: 2022-05-18
Payer: MEDICARE

## 2022-05-18 ENCOUNTER — PATIENT MESSAGE (OUTPATIENT)
Dept: PHARMACY | Facility: CLINIC | Age: 72
End: 2022-05-18
Payer: MEDICARE

## 2022-05-18 VITALS
DIASTOLIC BLOOD PRESSURE: 76 MMHG | WEIGHT: 192 LBS | TEMPERATURE: 98 F | SYSTOLIC BLOOD PRESSURE: 132 MMHG | HEART RATE: 65 BPM | BODY MASS INDEX: 30.07 KG/M2

## 2022-05-18 DIAGNOSIS — I25.10 CORONARY ARTERY DISEASE INVOLVING NATIVE CORONARY ARTERY OF NATIVE HEART WITHOUT ANGINA PECTORIS: ICD-10-CM

## 2022-05-18 DIAGNOSIS — N18.31 STAGE 3A CHRONIC KIDNEY DISEASE: ICD-10-CM

## 2022-05-18 DIAGNOSIS — E78.5 HYPERLIPIDEMIA, UNSPECIFIED HYPERLIPIDEMIA TYPE: ICD-10-CM

## 2022-05-18 PROCEDURE — 3008F BODY MASS INDEX DOCD: CPT | Mod: CPTII,S$GLB,, | Performed by: NURSE PRACTITIONER

## 2022-05-18 PROCEDURE — 1159F MED LIST DOCD IN RCRD: CPT | Mod: CPTII,S$GLB,, | Performed by: NURSE PRACTITIONER

## 2022-05-18 PROCEDURE — 3060F PR POS MICROALBUMINURIA RESULT DOCUMENTED/REVIEW: ICD-10-PCS | Mod: CPTII,S$GLB,, | Performed by: NURSE PRACTITIONER

## 2022-05-18 PROCEDURE — 1126F PR PAIN SEVERITY QUANTIFIED, NO PAIN PRESENT: ICD-10-PCS | Mod: CPTII,S$GLB,, | Performed by: NURSE PRACTITIONER

## 2022-05-18 PROCEDURE — 3008F PR BODY MASS INDEX (BMI) DOCUMENTED: ICD-10-PCS | Mod: CPTII,S$GLB,, | Performed by: NURSE PRACTITIONER

## 2022-05-18 PROCEDURE — 3066F NEPHROPATHY DOC TX: CPT | Mod: CPTII,S$GLB,, | Performed by: NURSE PRACTITIONER

## 2022-05-18 PROCEDURE — 99999 PR PBB SHADOW E&M-EST. PATIENT-LVL V: ICD-10-PCS | Mod: PBBFAC,,, | Performed by: NURSE PRACTITIONER

## 2022-05-18 PROCEDURE — 99999 PR PBB SHADOW E&M-EST. PATIENT-LVL V: CPT | Mod: PBBFAC,,, | Performed by: NURSE PRACTITIONER

## 2022-05-18 PROCEDURE — 1160F PR REVIEW ALL MEDS BY PRESCRIBER/CLIN PHARMACIST DOCUMENTED: ICD-10-PCS | Mod: CPTII,S$GLB,, | Performed by: NURSE PRACTITIONER

## 2022-05-18 PROCEDURE — 3051F HG A1C>EQUAL 7.0%<8.0%: CPT | Mod: CPTII,S$GLB,, | Performed by: NURSE PRACTITIONER

## 2022-05-18 PROCEDURE — 1126F AMNT PAIN NOTED NONE PRSNT: CPT | Mod: CPTII,S$GLB,, | Performed by: NURSE PRACTITIONER

## 2022-05-18 PROCEDURE — 99214 PR OFFICE/OUTPT VISIT, EST, LEVL IV, 30-39 MIN: ICD-10-PCS | Mod: S$GLB,,, | Performed by: NURSE PRACTITIONER

## 2022-05-18 PROCEDURE — 3075F SYST BP GE 130 - 139MM HG: CPT | Mod: CPTII,S$GLB,, | Performed by: NURSE PRACTITIONER

## 2022-05-18 PROCEDURE — 3060F POS MICROALBUMINURIA REV: CPT | Mod: CPTII,S$GLB,, | Performed by: NURSE PRACTITIONER

## 2022-05-18 PROCEDURE — 3072F PR LOW RISK FOR RETINOPATHY: ICD-10-PCS | Mod: CPTII,S$GLB,, | Performed by: NURSE PRACTITIONER

## 2022-05-18 PROCEDURE — 3078F DIAST BP <80 MM HG: CPT | Mod: CPTII,S$GLB,, | Performed by: NURSE PRACTITIONER

## 2022-05-18 PROCEDURE — 3051F PR MOST RECENT HEMOGLOBIN A1C LEVEL 7.0 - < 8.0%: ICD-10-PCS | Mod: CPTII,S$GLB,, | Performed by: NURSE PRACTITIONER

## 2022-05-18 PROCEDURE — 3078F PR MOST RECENT DIASTOLIC BLOOD PRESSURE < 80 MM HG: ICD-10-PCS | Mod: CPTII,S$GLB,, | Performed by: NURSE PRACTITIONER

## 2022-05-18 PROCEDURE — 3075F PR MOST RECENT SYSTOLIC BLOOD PRESS GE 130-139MM HG: ICD-10-PCS | Mod: CPTII,S$GLB,, | Performed by: NURSE PRACTITIONER

## 2022-05-18 PROCEDURE — 1160F RVW MEDS BY RX/DR IN RCRD: CPT | Mod: CPTII,S$GLB,, | Performed by: NURSE PRACTITIONER

## 2022-05-18 PROCEDURE — 3066F PR DOCUMENTATION OF TREATMENT FOR NEPHROPATHY: ICD-10-PCS | Mod: CPTII,S$GLB,, | Performed by: NURSE PRACTITIONER

## 2022-05-18 PROCEDURE — 1159F PR MEDICATION LIST DOCUMENTED IN MEDICAL RECORD: ICD-10-PCS | Mod: CPTII,S$GLB,, | Performed by: NURSE PRACTITIONER

## 2022-05-18 PROCEDURE — 99214 OFFICE O/P EST MOD 30 MIN: CPT | Mod: S$GLB,,, | Performed by: NURSE PRACTITIONER

## 2022-05-18 PROCEDURE — 3072F LOW RISK FOR RETINOPATHY: CPT | Mod: CPTII,S$GLB,, | Performed by: NURSE PRACTITIONER

## 2022-05-18 RX ORDER — DAPAGLIFLOZIN 5 MG/1
5 TABLET, FILM COATED ORAL DAILY
Qty: 30 TABLET | Refills: 3
Start: 2022-05-18 | End: 2022-05-19

## 2022-05-18 NOTE — TELEPHONE ENCOUNTER
I have a voucher to get a 30 day suppy of Farxiga but a prescription has to be sent to a retail pharmacy.  The patient would like you to send it to [facility] on [address].   Also, informed patient I need his proof of income to submit an application to [employer](Farxiga).

## 2022-05-18 NOTE — PROGRESS NOTES
CC: This 71 y.o. White male  is here for evaluation of  T2DM along with comorbidities indicated in the Visit Diagnosis section of this encounter.    HPI: Driss Hernandez Jr. was diagnosed with T2DM in ~ 2005.   Pt was under care of Dr. Agrawal and TARI Werner NP, previously.   Medical history: CAD s/p CABG, atrial fibrillation, CHF           Prior visit 2/2022  Returns for routine f/u and review of CGM unblinded study.   a1c is up from 6.9 to 7.7%.   Admits diet over holidays was poor.    9 lb weight gain since Sept.   CGM INTERPRETATION: BGs are overall in range even after high carb meals/snacks (for example, BG remained in range after eating 5 inch poboy, chips, and strawberries). However, BG tends to fall often to the 70s throughout the day and night, and presumably his BGs would've dropped even lower during the study had he not received low alert alarms.   Plan Reviewed CGM data in detail with patient.   Reduce Tresiba to 24 units once daily.   Continue Ozempic, metformin and glimepiride.   Reduce amount of sweets.   Test glucose 2-3x/day.   Return to clinic in 3 months with labs prior.       Interval history  a1c is down from 7.7 to 7.4%.   He is scheduled for shoulder sx in July.   Diet worsened over the last month d/t diet. Has been eating baked goods at home.       LAST DIABETES EDUCATION: never --     HOSPITALIZED FOR DIABETES  -  No.    DIABETES MEDICATION HX:   ozempic started 10/2020  Novolin R switched to glimepiride in Jan 2021     PRESCRIBED DIABETES MEDICATIONS:   Ozempic 1 mg once weekly  (obtains from Pharmacy Assistance)  Tresiba  24 units hs (obtains from Pharmacy [facility])  metformin ER 1000 mg bid  glimepiride 2 mg once daily around 3 pm before snack     Misses medication doses - No      DM COMPLICATIONS: peripheral neuropathy and cardiovascular disease    SELF MONITORING BLOOD GLUCOSE: Checks blood glucose at home 2-3x/day. Forgot logs. He declines personal CGM d/t cost. He does not want  to pay anything for his testing supplies.               HYPOGLYCEMIC EPISODES: rare      CURRENT DIET: drinks water, 2% milk ~ 2 cup/day. Eats 2-3 meals/day, skips breakfast    drinks 2 cups coffee with milk with AS in AM   Likes to to get a poboy once a week.       CURRENT EXERCISE: none recent. Activity limited by MAYEN    SOCIAL: his daughter is a palliative care NP      /76   Pulse 65   Temp 98.3 °F (36.8 °C)   Wt 87.1 kg (192 lb)   BMI 30.07 kg/m²       ROS:   CONSTITUTIONAL: Appetite good, denies fatigue  MS: + RIGHT SHOULDER PAIN       PHYSICAL EXAM:  GENERAL: Well developed, well nourished. No acute distress.   PSYCH: AAOx3, appropriate mood and affect, conversant, well-groomed. Judgement and insight good.   NEURO: Cranial nerves grossly intact. Speech clear, no tremor.   CHEST: Respirations even and unlabored.         Hemoglobin A1C   Date Value Ref Range Status   05/11/2022 7.4 (H) 4.0 - 5.6 % Final     Comment:     ADA Screening Guidelines:  5.7-6.4%  Consistent with prediabetes  >or=6.5%  Consistent with diabetes    High levels of fetal hemoglobin interfere with the HbA1C  assay. Heterozygous hemoglobin variants (HbS, HgC, etc)do  not significantly interfere with this assay.   However, presence of multiple variants may affect accuracy.     02/10/2022 7.7 (H) 4.0 - 5.6 % Final     Comment:     ADA Screening Guidelines:  5.7-6.4%  Consistent with prediabetes  >or=6.5%  Consistent with diabetes    High levels of fetal hemoglobin interfere with the HbA1C  assay. Heterozygous hemoglobin variants (HbS, HgC, etc)do  not significantly interfere with this assay.   However, presence of multiple variants may affect accuracy.     09/15/2021 6.9 (H) 4.0 - 5.6 % Final     Comment:     ADA Screening Guidelines:  5.7-6.4%  Consistent with prediabetes  >or=6.5%  Consistent with diabetes    High levels of fetal hemoglobin interfere with the HbA1C  assay. Heterozygous hemoglobin variants (HbS, HgC, etc)do  not  significantly interfere with this assay.   However, presence of multiple variants may affect accuracy.     08/29/2019 7.7 % Final     Comment:     Ania Werner           Chemistry        Component Value Date/Time     04/28/2022 0923    K 4.9 04/28/2022 0923     04/28/2022 0923    CO2 24 04/28/2022 0923    BUN 30 (H) 04/28/2022 0923    CREATININE 1.5 (H) 05/11/2022 0722     (H) 04/28/2022 0923        Component Value Date/Time    CALCIUM 9.7 04/28/2022 0923    ALKPHOS 41 (L) 08/18/2020 0928    AST 18 08/18/2020 0928    ALT 18 08/18/2020 0928    BILITOT 1.1 (H) 08/18/2020 0928    ESTGFRAFRICA 53.0 (A) 05/11/2022 0722    EGFRNONAA 45.9 (A) 05/11/2022 0722          Lab Results   Component Value Date    LDLCALC 77.8 05/11/2022          Latest Reference Range & Units 05/11/22 07:22   Cholesterol 120 - 199 mg/dL 123 [1]   HDL 40 - 75 mg/dL 22 (L) [2]   HDL/Cholesterol Ratio 20.0 - 50.0 % 17.9 (L)   LDL Cholesterol External 63.0 - 159.0 mg/dL 77.8 [3]   Non-HDL Cholesterol mg/dL 101 [4]   Total Cholesterol/HDL Ratio 2.0 - 5.0  5.6 (H)   Triglycerides 30 - 150 mg/dL 116 [5]     Lab Results   Component Value Date    MICALBCREAT 141.2 (H) 02/10/2022             ASSESSMENT and PLAN:    A1C GOAL: < 7.5 %     1. Diabetes mellitus type 2, uncontrolled, with complications  Diabetes is fairly well controlled with A1c of 7.4%.   Recommending maintaining a healthy diet consistently, avoiding sweets.   Continue Tresiba at 24 units, metformin, glimepiride, and Ozempic.   Discussed starting SGLT2-inhibitor due to cardiovascular, heart failure, and renal benefits. Patient is interested if he can obtain through a financial assistance program.   Referral placed for Pharmacy Assistance to apply for Farxiga 5 mg daily. Potential side effects: genitourinary infections, dry mouth, and dizziness.     Return to clinic in 3 months with labs prior.     Ambulatory referral/consult to Pharmacy Assistance    Hemoglobin A1C   2.  Coronary artery disease involving native coronary artery of native heart without angina pectoris  Start Farxiga as above.    3. Hyperlipidemia, unspecified hyperlipidemia type  Continue atorvastatin    4.  CKD stage 3  Start Farxiga, f/u with nephrologist.        Orders Placed This Encounter   Procedures    Hemoglobin A1C     Standing Status:   Future     Standing Expiration Date:   7/17/2023    Creatinine, Serum     Standing Status:   Future     Standing Expiration Date:   7/17/2023    Ambulatory referral/consult to Pharmacy Assistance     Standing Status:   Future     Standing Expiration Date:   6/18/2023     Referral Priority:   Routine     Referral Type:   Consultation     Referral Reason:   Specialty Services Required     Number of Visits Requested:   1        Follow up in about 3 months (around 8/18/2022).

## 2022-05-18 NOTE — PATIENT INSTRUCTIONS
Diabetes is fairly well controlled with A1c of 7.4%.   Recommending maintaining a healthy diet consistently, avoiding sweets.   Continue Tresiba at 24 units, metformin, glimepiride, and Ozempic.   Discussed starting SGLT2-inhibitor due to cardiovascular, heart failure, and renal benefits. Patient is interested if he can obtain through a financial assistance program.   Referral placed for Pharmacy Assistance to apply for Farxiga 5 mg daily. Potential side effects: genitourinary infections, dry mouth, and dizziness.     Return to clinic in 3 months with labs prior.

## 2022-05-19 RX ORDER — DAPAGLIFLOZIN 5 MG/1
5 TABLET, FILM COATED ORAL DAILY
Qty: 30 TABLET | Refills: 0 | Status: SHIPPED | OUTPATIENT
Start: 2022-05-19 | End: 2023-02-27 | Stop reason: SDUPTHER

## 2022-05-25 ENCOUNTER — PES CALL (OUTPATIENT)
Dept: ADMINISTRATIVE | Facility: CLINIC | Age: 72
End: 2022-05-25
Payer: MEDICARE

## 2022-05-26 ENCOUNTER — TELEPHONE (OUTPATIENT)
Dept: SPINE | Facility: CLINIC | Age: 72
End: 2022-05-26
Payer: MEDICARE

## 2022-05-26 NOTE — TELEPHONE ENCOUNTER
----- Message from Angie Burgos sent at 5/26/2022 10:33 AM CDT -----  Type: Patient Call Back    Who called: Wife/Ada    What is the request in detail: Pt received a steroid shot in his back last week. Pt fell and would like to know if he can receive another shot.    Can the clinic reply by [facility]? no    Would the patient rather a call back or a response via [facility]? Call back    Best call back number: [phone] (home)  [phone]

## 2022-05-26 NOTE — TELEPHONE ENCOUNTER
Staff contacted and spoke with patient spouse in regards to pt receiving another steroid injection due to having a fall.      Pt was seen last week on 5/17 with BAILEY and received a tpi; staff informed pt spouse pt will have to wait until 2 weeks before another injection.      Pt spouse verbalized understanding and thank staff.

## 2022-05-28 NOTE — TELEPHONE ENCOUNTER
Care Due:                  Date            Visit Type   Department     Provider  --------------------------------------------------------------------------------                                ELOY      [facility]                              FOLLOWUP/OF  MED/ INTERNAL  Jono Lizzie  Last Visit: 10-      FICE VISIT   MED/ PEDS      Ehrensing                                           [facility]/ INTERNAL  [facility]  Next Visit: 06-      PRE-OP       MED/ PEDS      Ehrensing                                                            Last  Test          Frequency    Reason                     Performed    Due Date  --------------------------------------------------------------------------------    CMP.........  12 months..  fenofibrate, omega-3.....  08- 08-    [facility] Embedded Care Gaps. Reference number: [id number]. 5/27/2022   10:47:09 PM CDT

## 2022-05-30 DIAGNOSIS — I10 ESSENTIAL HYPERTENSION: ICD-10-CM

## 2022-05-30 DIAGNOSIS — I25.119 CORONARY ARTERY DISEASE INVOLVING NATIVE CORONARY ARTERY OF NATIVE HEART WITH ANGINA PECTORIS: ICD-10-CM

## 2022-05-31 RX ORDER — ISOSORBIDE MONONITRATE 120 MG/1
240 TABLET, EXTENDED RELEASE ORAL DAILY
Qty: 180 TABLET | Refills: 3 | Status: SHIPPED | OUTPATIENT
Start: 2022-05-31 | End: 2022-09-22

## 2022-06-01 RX ORDER — GABAPENTIN 100 MG/1
CAPSULE ORAL
Qty: 450 CAPSULE | Refills: 0 | Status: SHIPPED | OUTPATIENT
Start: 2022-06-01 | End: 2022-11-30

## 2022-06-07 ENCOUNTER — OFFICE VISIT (OUTPATIENT)
Dept: FAMILY MEDICINE | Facility: CLINIC | Age: 72
End: 2022-06-07
Payer: MEDICARE

## 2022-06-07 VITALS
HEIGHT: 67 IN | SYSTOLIC BLOOD PRESSURE: 124 MMHG | HEART RATE: 67 BPM | TEMPERATURE: 98 F | BODY MASS INDEX: 29.1 KG/M2 | OXYGEN SATURATION: 97 % | WEIGHT: 185.44 LBS | DIASTOLIC BLOOD PRESSURE: 62 MMHG

## 2022-06-07 DIAGNOSIS — Z79.4 DIABETES MELLITUS DUE TO UNDERLYING CONDITION WITH STAGE 3 CHRONIC KIDNEY DISEASE, WITH LONG-TERM CURRENT USE OF INSULIN, UNSPECIFIED WHETHER STAGE 3A OR 3B CKD: ICD-10-CM

## 2022-06-07 DIAGNOSIS — N18.30 DIABETES MELLITUS DUE TO UNDERLYING CONDITION WITH STAGE 3 CHRONIC KIDNEY DISEASE, WITH LONG-TERM CURRENT USE OF INSULIN, UNSPECIFIED WHETHER STAGE 3A OR 3B CKD: ICD-10-CM

## 2022-06-07 DIAGNOSIS — N18.30 STAGE 3 CHRONIC KIDNEY DISEASE, UNSPECIFIED WHETHER STAGE 3A OR 3B CKD: ICD-10-CM

## 2022-06-07 DIAGNOSIS — I70.0 ABDOMINAL AORTIC ATHEROSCLEROSIS: ICD-10-CM

## 2022-06-07 DIAGNOSIS — E78.2 MIXED HYPERLIPIDEMIA: ICD-10-CM

## 2022-06-07 DIAGNOSIS — D64.9 ANEMIA, UNSPECIFIED TYPE: ICD-10-CM

## 2022-06-07 DIAGNOSIS — M25.519 SHOULDER PAIN, UNSPECIFIED CHRONICITY, UNSPECIFIED LATERALITY: ICD-10-CM

## 2022-06-07 DIAGNOSIS — N40.0 BENIGN PROSTATIC HYPERPLASIA, UNSPECIFIED WHETHER LOWER URINARY TRACT SYMPTOMS PRESENT: ICD-10-CM

## 2022-06-07 DIAGNOSIS — E08.22 DIABETES MELLITUS DUE TO UNDERLYING CONDITION WITH STAGE 3 CHRONIC KIDNEY DISEASE, WITH LONG-TERM CURRENT USE OF INSULIN, UNSPECIFIED WHETHER STAGE 3A OR 3B CKD: ICD-10-CM

## 2022-06-07 DIAGNOSIS — E11.42 DIABETIC POLYNEUROPATHY ASSOCIATED WITH TYPE 2 DIABETES MELLITUS: ICD-10-CM

## 2022-06-07 DIAGNOSIS — Z86.010 HISTORY OF COLONIC POLYPS: ICD-10-CM

## 2022-06-07 DIAGNOSIS — Z12.5 SCREENING FOR PROSTATE CANCER: ICD-10-CM

## 2022-06-07 DIAGNOSIS — F13.20 SEDATIVE DEPENDENCE: ICD-10-CM

## 2022-06-07 DIAGNOSIS — I73.9 PAD (PERIPHERAL ARTERY DISEASE): ICD-10-CM

## 2022-06-07 DIAGNOSIS — Z79.4 LONG-TERM INSULIN USE: ICD-10-CM

## 2022-06-07 DIAGNOSIS — I48.11 LONGSTANDING PERSISTENT ATRIAL FIBRILLATION: ICD-10-CM

## 2022-06-07 DIAGNOSIS — E11.9 DM TYPE 2 WITHOUT RETINOPATHY: ICD-10-CM

## 2022-06-07 DIAGNOSIS — Z01.818 PREOPERATIVE EXAMINATION: ICD-10-CM

## 2022-06-07 DIAGNOSIS — G47.00 INSOMNIA, UNSPECIFIED TYPE: ICD-10-CM

## 2022-06-07 DIAGNOSIS — I25.119 CORONARY ARTERY DISEASE INVOLVING NATIVE CORONARY ARTERY OF NATIVE HEART WITH ANGINA PECTORIS: ICD-10-CM

## 2022-06-07 DIAGNOSIS — Z00.00 ROUTINE MEDICAL EXAM: Primary | ICD-10-CM

## 2022-06-07 PROCEDURE — 3008F BODY MASS INDEX DOCD: CPT | Mod: CPTII,S$GLB,, | Performed by: INTERNAL MEDICINE

## 2022-06-07 PROCEDURE — 99214 PR OFFICE/OUTPT VISIT, EST, LEVL IV, 30-39 MIN: ICD-10-PCS | Mod: 25,S$GLB,, | Performed by: INTERNAL MEDICINE

## 2022-06-07 PROCEDURE — 3060F PR POS MICROALBUMINURIA RESULT DOCUMENTED/REVIEW: ICD-10-PCS | Mod: CPTII,S$GLB,, | Performed by: INTERNAL MEDICINE

## 2022-06-07 PROCEDURE — 1101F PR PT FALLS ASSESS DOC 0-1 FALLS W/OUT INJ PAST YR: ICD-10-PCS | Mod: CPTII,S$GLB,, | Performed by: INTERNAL MEDICINE

## 2022-06-07 PROCEDURE — 99214 OFFICE O/P EST MOD 30 MIN: CPT | Mod: 25,S$GLB,, | Performed by: INTERNAL MEDICINE

## 2022-06-07 PROCEDURE — 3008F PR BODY MASS INDEX (BMI) DOCUMENTED: ICD-10-PCS | Mod: CPTII,S$GLB,, | Performed by: INTERNAL MEDICINE

## 2022-06-07 PROCEDURE — 3051F HG A1C>EQUAL 7.0%<8.0%: CPT | Mod: CPTII,S$GLB,, | Performed by: INTERNAL MEDICINE

## 2022-06-07 PROCEDURE — 99397 PR PREVENTIVE VISIT,EST,65 & OVER: ICD-10-PCS | Mod: S$GLB,,, | Performed by: INTERNAL MEDICINE

## 2022-06-07 PROCEDURE — 3288F PR FALLS RISK ASSESSMENT DOCUMENTED: ICD-10-PCS | Mod: CPTII,S$GLB,, | Performed by: INTERNAL MEDICINE

## 2022-06-07 PROCEDURE — 3051F PR MOST RECENT HEMOGLOBIN A1C LEVEL 7.0 - < 8.0%: ICD-10-PCS | Mod: CPTII,S$GLB,, | Performed by: INTERNAL MEDICINE

## 2022-06-07 PROCEDURE — 3072F PR LOW RISK FOR RETINOPATHY: ICD-10-PCS | Mod: CPTII,S$GLB,, | Performed by: INTERNAL MEDICINE

## 2022-06-07 PROCEDURE — 3060F POS MICROALBUMINURIA REV: CPT | Mod: CPTII,S$GLB,, | Performed by: INTERNAL MEDICINE

## 2022-06-07 PROCEDURE — 3078F PR MOST RECENT DIASTOLIC BLOOD PRESSURE < 80 MM HG: ICD-10-PCS | Mod: CPTII,S$GLB,, | Performed by: INTERNAL MEDICINE

## 2022-06-07 PROCEDURE — 99999 PR PBB SHADOW E&M-EST. PATIENT-LVL V: ICD-10-PCS | Mod: PBBFAC,,, | Performed by: INTERNAL MEDICINE

## 2022-06-07 PROCEDURE — 3288F FALL RISK ASSESSMENT DOCD: CPT | Mod: CPTII,S$GLB,, | Performed by: INTERNAL MEDICINE

## 2022-06-07 PROCEDURE — 3066F NEPHROPATHY DOC TX: CPT | Mod: CPTII,S$GLB,, | Performed by: INTERNAL MEDICINE

## 2022-06-07 PROCEDURE — 99999 PR PBB SHADOW E&M-EST. PATIENT-LVL V: CPT | Mod: PBBFAC,,, | Performed by: INTERNAL MEDICINE

## 2022-06-07 PROCEDURE — 1159F MED LIST DOCD IN RCRD: CPT | Mod: CPTII,S$GLB,, | Performed by: INTERNAL MEDICINE

## 2022-06-07 PROCEDURE — 3074F PR MOST RECENT SYSTOLIC BLOOD PRESSURE < 130 MM HG: ICD-10-PCS | Mod: CPTII,S$GLB,, | Performed by: INTERNAL MEDICINE

## 2022-06-07 PROCEDURE — 99397 PER PM REEVAL EST PAT 65+ YR: CPT | Mod: S$GLB,,, | Performed by: INTERNAL MEDICINE

## 2022-06-07 PROCEDURE — 3074F SYST BP LT 130 MM HG: CPT | Mod: CPTII,S$GLB,, | Performed by: INTERNAL MEDICINE

## 2022-06-07 PROCEDURE — 1159F PR MEDICATION LIST DOCUMENTED IN MEDICAL RECORD: ICD-10-PCS | Mod: CPTII,S$GLB,, | Performed by: INTERNAL MEDICINE

## 2022-06-07 PROCEDURE — 1101F PT FALLS ASSESS-DOCD LE1/YR: CPT | Mod: CPTII,S$GLB,, | Performed by: INTERNAL MEDICINE

## 2022-06-07 PROCEDURE — 3072F LOW RISK FOR RETINOPATHY: CPT | Mod: CPTII,S$GLB,, | Performed by: INTERNAL MEDICINE

## 2022-06-07 PROCEDURE — 3066F PR DOCUMENTATION OF TREATMENT FOR NEPHROPATHY: ICD-10-PCS | Mod: CPTII,S$GLB,, | Performed by: INTERNAL MEDICINE

## 2022-06-07 PROCEDURE — 3078F DIAST BP <80 MM HG: CPT | Mod: CPTII,S$GLB,, | Performed by: INTERNAL MEDICINE

## 2022-06-07 NOTE — PROGRESS NOTES
Chief complaint:   Physical exam    71-year-old white male.  colonoscopy 3 polyps 2/17 -5 yrs. PSA 2020 .  He is active and otherwise healthy      ROS:   CONST: weight stable. EYES: no vision change. ENT: no sore throat. CV: no chest pain w/ exertion. RESP: no shortness of breath. GI: no nausea, vomiting, diarrhea. No dysphagia. : no urinary issues. MUSCULOSKELETAL: no new myalgias or arthralgias. SKIN: no new changes. NEURO: no focal deficits. PSYCH: no new issues. ENDOCRINE: no polyuria. HEME: no lymph nodes. ALLERGY: no general pruritis.      Past medical history:  1.  Uncontrolled diabetes with neuropathy, followed by Dr. Agrawal  2.  Coronary artery disease with triple bypass March 2016, followed by the heart clinic. Now [facility], neg PET 2017, Nuc/echo neg 3/20,  3.  Back surgery 2002.  5.  Hyperlipidemia.  6.  Hypertension.  7.   colonoscopy 3 polyps 2/17 -5 yrs  8.  Pneumovax and Prevnar given 2015 according to records  9.  Right leg radiculopathy, confirmed by MRI, did respond epidural with [facility] pain management  10. Partial small-bowel obstruction October 2018  11.  Abnormal TSH on occasion    Past Surgical History:   Procedure Laterality Date    BACK SURGERY      2002    CATARACT EXTRACTION W/  INTRAOCULAR LENS IMPLANT Right 08/29/2017    Dr. Hong    CATARACT EXTRACTION W/  INTRAOCULAR LENS IMPLANT Left 02/24/2022    Dr. Hong    COLONOSCOPY N/A 2/21/2017    Procedure: COLONOSCOPY;  Surgeon: Robb Rosado MD;  Location: [facility] ENDO;  Service: Endoscopy;  Laterality: N/A;    CORONARY ANGIOGRAPHY INCLUDING BYPASS GRAFTS WITH CATHETERIZATION OF LEFT HEART N/A 11/11/2020    Procedure: ANGIOGRAM, CORONARY, INCLUDING BYPASS GRAFT, WITH LEFT HEART CATHETERIZATION;  Surgeon: Lane Cuellar MD;  Location: [facility] CATH LAB;  Service: Cardiology;  Laterality: N/A;  RN PRE OP 11-5-2020. --COVID NEGATIVE ON  11-. C A    CORONARY ARTERY BYPASS GRAFT      March 2016    EPIDURAL STEROID  INJECTION Bilateral 2018    Procedure: Lumbar Medial Branch Blocks;  Surgeon: Eze Byrd Jr., MD;  Location: [facility] ENDO;  Service: Pain Management;  Laterality: Bilateral;  Bilateral Lumbar Medial Branch Blocks L2, L3, L4, L5    [id number]  [id number]    Arrive @ 1150; NO Sedation    EPIDURAL STEROID INJECTION Bilateral 2018    Procedure: Injection, Steroid, Epidural;  Surgeon: Eze yBrd Jr., MD;  Location: [facility] ENDO;  Service: Pain Management;  Laterality: Bilateral;  Bilateral Sacroiliac Joint Steroid Injections    [id number]    Arrive @ 0910    EPIDURAL STEROID INJECTION Bilateral 3/20/2019    Procedure: Injection, Steroid, Sacroiliiac Joint;  Surgeon: Eze Byrd Jr., MD;  Location: [facility] ENDO;  Service: Pain Management;  Laterality: Bilateral;  Bilateral Sacroiliac Joint Steroid Injections    [id number]    Arrive @ 1145; Trigger point injections also?    INTRAOCULAR PROSTHESES INSERTION Left 2022    Procedure: INSERTION, IOL PROSTHESIS;  Surgeon: Nickolas Hong MD;  Location: [facility] OR;  Service: Ophthalmology;  Laterality: Left;    PHACOEMULSIFICATION OF CATARACT Left 2022    Procedure: PHACOEMULSIFICATION, CATARACT;  Surgeon: Nickolas Hong MD;  Location: [facility] OR;  Service: Ophthalmology;  Laterality: Left;  RN Phone Pre Op 22.  Covid NEGATIVE  22.  Arrival 08:00 am.    TONSILLECTOMY           Family history: Father  at 77 with stroke.  Mother  at 72 with heart problems, diabetes, hypertension.  2 sisters living in their 80s and one  at 82.  One brother  at 80 with some kidney disease and diabetes.  Sisters with diabetes, hypertension and stroke.  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  He drinks alcohol about 3 times.  Never smoked.      Vital signs as above,     Gen: no distress  EYES: conjunctiva clear, non-icteric,  PERRL  ENT: nose clear, nasal mucosa normal, oropharynx clear and moist, teeth good  NECK:supple, thyroid non-palpable  RESP: effort is good, lungs clear  CV: heart RRR w/o murmur, gallops or rubs; no carotid bruits, no edema  GI: abdomen soft, non-distended, non-tender, no hepatosplenomegaly  MS: gait normal, no clubbing or cyanosis of the digits  SKIN: no rashes, warm to touch    Driss was seen today for pre-op exam.    Diagnoses and all orders for this visit:    Routine medical exam    Screening for prostate cancer    History of colonic polyps                                                    Additional evaluation and management issues:    Additionally patient has other medical issues to address separate from his physical.  He is also seen today in preop consultation from [facility] needing a shoulder surgery in July.  He does have a Medicare product that does not formally cover preop assessment but his assessment will be done either way.  He has moderate pain in the shoulder.  Orthopedic notes reviewed.  Also reviewed he has been cleared by Cardiology.  He takes an occasional Celebrex 100 and sometimes 200. He does take some Advil once per week and everyone has already told him not to take the Advil for his renal insufficiency as well as his use of anticoagulants and he will stop doing so.  We discussed Tylenol arthritis would be safe as well.  He is active without chest pains or shortness of breath and will be medically cleared for surgery.    right shoulder arthroscopy with arthroscopic biceps tenodesis, rotator cuff repair, possible Regeneten    Seen [place]  ASSESSMENT:   # CAD s/p CABG/PCI OM 12/2020, MAYEN and cough much improved.   # HTN, uncontrolled  # HFpEF, mildly vol overloaded on exam, but improved vs prior OV.  # Chr AF on eliquis 5mg bid, HR controlled  # HLP on atorva 40mg  # PAD, abnl NUSRAT 4/18/18  # carotid atherosclerosis (CUS 3/2022)  # L>R LE edema, improved.  LE venous US 4/2022  neg.  # aortic atherosclerosis (CT Chest 10/29/18)  # preop for R shoulder ortho procedure.  The patient demonstrated good exercise capacity office today.     PLAN:   Cont med rx  The patient is at low cardiac risk for the planned orthopedic surgical procedure and associated anesthesia.  No further preoperative cardiac testing is required.  If needed, it is acceptable hold the Eliquis for 3 days prior to surgery and resume it as soon as possible thereafter.  If needed, it is acceptable to hold the Plavix for 5 days prior to surgery and resume it as soon as possible thereafter.  Cont Plavix 75mg qd  Cont eliquis 5mg bid  Cont lasix 20mg bid, may have to accept some degree of azotemia to maintain euvolemia.  Dec amlod 5mg qd (?contributing to edema/vol overload), consider stopping the future.  Add hydrala 50mg bid  Check BMP 2 weeks  Enroll in digital med programs  RTC 4 weeks for review and titration of hydrala/stopping amlod.  Consider addition of entresto (cost concerns at present).  Check lipids/LFT 2 months (July 2022)  Surveillance carotid US 5 months (Sept 2022)      Also with uncontrolled diabetes which he says is worse due to dietary issues related to the pandemic.  He needs to get a new endocrinologist as his one at [place] now works for [employer] insurance requires him to see [facility].  We discussed some local options- seen Endo NP in Feb.  A1c did increase some.    Reduce glimepiride 2 mg once daily before breakfast.   Continue lantus 30 units once daily, Ozempic 1 mg once weekly, and metformin.   Test glucose 2x/day.   Follow up in 3 months with labs prior    He also occasionally take Halcion as Ambien we give him a hangover.  He does use Zanaflex at night to help him sleep.  We discussed the amnesia issues related to how she on any takes it rarely       Nuc and Echo ok 3/20    Regarding diabetes he was managed by outside Endocrine.  His A1c has been uncontrolled chronically -8.4 in may, 7.7,  8.2, 6.2 , 6.9, 7.4.  .  We discussed diet improvement.  He has chosen to use regular insulin due to cost.  We discussed that it may well need to be taken 30 min before the meal and could last for hours and he was not aware of that.  He can discuss in greater detail with his endocrinologist.  Continues on Lantus 30.  He is also on a weekly injection.  He was thinking about stopping it but encouraged him to do so as it may well be helping with his appetite over eating.    Discussed his medications that will cause glucose loss of urine and he will need to supplement fluid intake for what increased output he may have otherwise he can worsen renal insufficiency.  He does not like drinking water but will do so and discussed other water alternatives.    Apparently on a weekly injection and was discontinued due to the high triglycerides but I pointed out that is triglyceride elevation has been something that has fluctuated over the years and more so relates to diet and his uncontrolled diabetes.  He is now on fish oil as well as another pill for the high triglycerides.  He is tolerating it with  continued reflux and we discussed trying a different preparation.  He has already put them in the freezer..  He will need reassessment in a couple of months.    The other issue is an elevated TSH.  It looks like his TSH went up into the seven range and then a repeat in October was down to 5.2.  I explained hypothyroidism to him at length.  He had a daughter who developed at age 17.  We discussed that it will not be a significant health issue for him to remain hypothyroid until he figures this out.  He would prefer not to take medication and it does look like the TSH is trending down it did this in the past as well.      He is due to update lab work.      Evaluation and management of all the separate issues would based on medical decision making.  Labs and x-ray reports reviewed          Assessment plan:    Shoulder pain,  unspecified chronicity, unspecified laterality, set to have surgery    Preoperative examination, medically cleared for surgery, hold metformin 48 hours after general anesthesia    Diabetes mellitus with neurological manifestations, uncontrolled, followed by Endocrine, farxiga added  -     Iron and TIBC; Future  -     Ferritin; Future  -     Comprehensive Metabolic Panel; Future  -     TSH; Future  -     T4, Free; Future  -     CBC Auto Differential; Future    Stage 3 chronic kidney disease, unspecified whether stage 3a or 3b CKD, reassess  -     Comprehensive Metabolic Panel; Future    Diabetic polyneuropathy associated with type 2 diabetes mellitus    Anemia, unspecified type, reassess on anticoagulants with use of intermittent anti-inflammatories, check iron studies, no clinical bleeding  -     Iron and TIBC; Future  -     Ferritin; Future  -     CBC Auto Differential; Future    Long-term insulin use    Coronary artery disease involving native coronary artery of native heart with angina pectoris  -     BNP; Future    Longstanding persistent atrial fibrillation    Abdominal aortic atherosclerosis    Insomnia, unspecified type    DM type 2 without retinopathy    Mixed hyperlipidemia    PAD (peripheral artery disease)    Diabetes mellitus due to underlying condition with stage 3 chronic kidney disease, with long-term current use of insulin, unspecified whether stage 3a or 3b CKD    Sedative dependence    Benign prostatic hyperplasia, unspecified whether lower urinary tract symptoms present

## 2022-06-09 ENCOUNTER — TELEPHONE (OUTPATIENT)
Dept: ADMINISTRATIVE | Facility: CLINIC | Age: 72
End: 2022-06-09
Payer: MEDICARE

## 2022-06-09 ENCOUNTER — LAB VISIT (OUTPATIENT)
Dept: LAB | Facility: HOSPITAL | Age: 72
End: 2022-06-09
Attending: INTERNAL MEDICINE
Payer: MEDICARE

## 2022-06-09 DIAGNOSIS — N18.30 STAGE 3 CHRONIC KIDNEY DISEASE, UNSPECIFIED WHETHER STAGE 3A OR 3B CKD: ICD-10-CM

## 2022-06-09 DIAGNOSIS — D64.9 ANEMIA, UNSPECIFIED TYPE: ICD-10-CM

## 2022-06-09 DIAGNOSIS — I25.119 CORONARY ARTERY DISEASE INVOLVING NATIVE CORONARY ARTERY OF NATIVE HEART WITH ANGINA PECTORIS: ICD-10-CM

## 2022-06-09 LAB
ALBUMIN SERPL BCP-MCNC: 3.8 G/DL (ref 3.5–5.2)
ALP SERPL-CCNC: 28 U/L (ref 55–135)
ALT SERPL W/O P-5'-P-CCNC: 13 U/L (ref 10–44)
ANION GAP SERPL CALC-SCNC: 12 MMOL/L (ref 8–16)
AST SERPL-CCNC: 19 U/L (ref 10–40)
BASOPHILS # BLD AUTO: 0.05 K/UL (ref 0–0.2)
BASOPHILS NFR BLD: 0.9 % (ref 0–1.9)
BILIRUB SERPL-MCNC: 0.4 MG/DL (ref 0.1–1)
BNP SERPL-MCNC: 128 PG/ML (ref 0–99)
BUN SERPL-MCNC: 33 MG/DL (ref 8–23)
CALCIUM SERPL-MCNC: 10.2 MG/DL (ref 8.7–10.5)
CHLORIDE SERPL-SCNC: 105 MMOL/L (ref 95–110)
CO2 SERPL-SCNC: 26 MMOL/L (ref 23–29)
CREAT SERPL-MCNC: 1.5 MG/DL (ref 0.5–1.4)
DIFFERENTIAL METHOD: ABNORMAL
EOSINOPHIL # BLD AUTO: 0.1 K/UL (ref 0–0.5)
EOSINOPHIL NFR BLD: 2.5 % (ref 0–8)
ERYTHROCYTE [DISTWIDTH] IN BLOOD BY AUTOMATED COUNT: 18.7 % (ref 11.5–14.5)
EST. GFR  (AFRICAN AMERICAN): 53 ML/MIN/1.73 M^2
EST. GFR  (NON AFRICAN AMERICAN): 45.9 ML/MIN/1.73 M^2
FERRITIN SERPL-MCNC: 20 NG/ML (ref 20–300)
GLUCOSE SERPL-MCNC: 126 MG/DL (ref 70–110)
HCT VFR BLD AUTO: 36.7 % (ref 40–54)
HGB BLD-MCNC: 10.8 G/DL (ref 14–18)
IMM GRANULOCYTES # BLD AUTO: 0.01 K/UL (ref 0–0.04)
IMM GRANULOCYTES NFR BLD AUTO: 0.2 % (ref 0–0.5)
IRON SERPL-MCNC: 28 UG/DL (ref 45–160)
LYMPHOCYTES # BLD AUTO: 1.8 K/UL (ref 1–4.8)
LYMPHOCYTES NFR BLD: 32.6 % (ref 18–48)
MCH RBC QN AUTO: 22.7 PG (ref 27–31)
MCHC RBC AUTO-ENTMCNC: 29.4 G/DL (ref 32–36)
MCV RBC AUTO: 77 FL (ref 82–98)
MONOCYTES # BLD AUTO: 0.6 K/UL (ref 0.3–1)
MONOCYTES NFR BLD: 11.3 % (ref 4–15)
NEUTROPHILS # BLD AUTO: 2.9 K/UL (ref 1.8–7.7)
NEUTROPHILS NFR BLD: 52.5 % (ref 38–73)
NRBC BLD-RTO: 0 /100 WBC
PLATELET # BLD AUTO: 319 K/UL (ref 150–450)
PMV BLD AUTO: 9.7 FL (ref 9.2–12.9)
POTASSIUM SERPL-SCNC: 4.9 MMOL/L (ref 3.5–5.1)
PROT SERPL-MCNC: 7.8 G/DL (ref 6–8.4)
RBC # BLD AUTO: 4.76 M/UL (ref 4.6–6.2)
SATURATED IRON: 5 % (ref 20–50)
SODIUM SERPL-SCNC: 143 MMOL/L (ref 136–145)
T4 FREE SERPL-MCNC: 1 NG/DL (ref 0.71–1.51)
TOTAL IRON BINDING CAPACITY: 610 UG/DL (ref 250–450)
TRANSFERRIN SERPL-MCNC: 412 MG/DL (ref 200–375)
TSH SERPL DL<=0.005 MIU/L-ACNC: 4.89 UIU/ML (ref 0.4–4)
WBC # BLD AUTO: 5.56 K/UL (ref 3.9–12.7)

## 2022-06-09 PROCEDURE — 82728 ASSAY OF FERRITIN: CPT | Performed by: INTERNAL MEDICINE

## 2022-06-09 PROCEDURE — 85025 COMPLETE CBC W/AUTO DIFF WBC: CPT | Performed by: INTERNAL MEDICINE

## 2022-06-09 PROCEDURE — 36415 COLL VENOUS BLD VENIPUNCTURE: CPT | Mod: PO | Performed by: INTERNAL MEDICINE

## 2022-06-09 PROCEDURE — 80053 COMPREHEN METABOLIC PANEL: CPT | Performed by: INTERNAL MEDICINE

## 2022-06-09 PROCEDURE — 83880 ASSAY OF NATRIURETIC PEPTIDE: CPT | Performed by: INTERNAL MEDICINE

## 2022-06-09 PROCEDURE — 84439 ASSAY OF FREE THYROXINE: CPT | Performed by: INTERNAL MEDICINE

## 2022-06-09 PROCEDURE — 84466 ASSAY OF TRANSFERRIN: CPT | Performed by: INTERNAL MEDICINE

## 2022-06-09 PROCEDURE — 84443 ASSAY THYROID STIM HORMONE: CPT | Performed by: INTERNAL MEDICINE

## 2022-06-09 NOTE — TELEPHONE ENCOUNTER
Called pt, informed pt I was calling to remind pt of his in office EAWV on 6/13/22; clinic location provided to patient; pt confirmed appointment

## 2022-06-10 ENCOUNTER — PATIENT MESSAGE (OUTPATIENT)
Dept: CARDIOLOGY | Facility: CLINIC | Age: 72
End: 2022-06-10
Payer: MEDICARE

## 2022-06-13 ENCOUNTER — OFFICE VISIT (OUTPATIENT)
Dept: FAMILY MEDICINE | Facility: CLINIC | Age: 72
End: 2022-06-13
Payer: MEDICARE

## 2022-06-13 ENCOUNTER — OFFICE VISIT (OUTPATIENT)
Dept: CARDIOLOGY | Facility: CLINIC | Age: 72
End: 2022-06-13
Payer: MEDICARE

## 2022-06-13 VITALS
OXYGEN SATURATION: 99 % | HEART RATE: 65 BPM | WEIGHT: 185.5 LBS | BODY MASS INDEX: 29.11 KG/M2 | DIASTOLIC BLOOD PRESSURE: 64 MMHG | SYSTOLIC BLOOD PRESSURE: 120 MMHG | HEIGHT: 67 IN

## 2022-06-13 VITALS
RESPIRATION RATE: 15 BRPM | BODY MASS INDEX: 29.1 KG/M2 | HEART RATE: 65 BPM | DIASTOLIC BLOOD PRESSURE: 64 MMHG | WEIGHT: 185.44 LBS | OXYGEN SATURATION: 99 % | HEIGHT: 67 IN | SYSTOLIC BLOOD PRESSURE: 120 MMHG

## 2022-06-13 DIAGNOSIS — E11.65 TYPE 2 DIABETES MELLITUS WITH HYPERGLYCEMIA, WITH LONG-TERM CURRENT USE OF INSULIN: ICD-10-CM

## 2022-06-13 DIAGNOSIS — I25.810 CORONARY ARTERY DISEASE INVOLVING CORONARY BYPASS GRAFT OF NATIVE HEART WITHOUT ANGINA PECTORIS: Primary | ICD-10-CM

## 2022-06-13 DIAGNOSIS — I50.32 CHRONIC HEART FAILURE WITH PRESERVED EJECTION FRACTION: ICD-10-CM

## 2022-06-13 DIAGNOSIS — E78.2 MIXED HYPERLIPIDEMIA: ICD-10-CM

## 2022-06-13 DIAGNOSIS — Z79.4 DIABETES MELLITUS DUE TO UNDERLYING CONDITION WITH STAGE 3A CHRONIC KIDNEY DISEASE, WITH LONG-TERM CURRENT USE OF INSULIN: ICD-10-CM

## 2022-06-13 DIAGNOSIS — Z95.1 HX OF CABG: ICD-10-CM

## 2022-06-13 DIAGNOSIS — Z98.61 HISTORY OF PERCUTANEOUS CORONARY INTERVENTION: ICD-10-CM

## 2022-06-13 DIAGNOSIS — I48.11 LONGSTANDING PERSISTENT ATRIAL FIBRILLATION: ICD-10-CM

## 2022-06-13 DIAGNOSIS — N18.31 STAGE 3A CHRONIC KIDNEY DISEASE: ICD-10-CM

## 2022-06-13 DIAGNOSIS — E11.42 DIABETIC POLYNEUROPATHY ASSOCIATED WITH TYPE 2 DIABETES MELLITUS: ICD-10-CM

## 2022-06-13 DIAGNOSIS — I25.700 CORONARY ARTERY DISEASE INVOLVING CORONARY BYPASS GRAFT OF NATIVE HEART WITH UNSTABLE ANGINA PECTORIS: ICD-10-CM

## 2022-06-13 DIAGNOSIS — I10 ESSENTIAL HYPERTENSION: ICD-10-CM

## 2022-06-13 DIAGNOSIS — R07.89 CHEST PAIN, ATYPICAL: ICD-10-CM

## 2022-06-13 DIAGNOSIS — E08.22 DIABETES MELLITUS DUE TO UNDERLYING CONDITION WITH STAGE 3A CHRONIC KIDNEY DISEASE, WITH LONG-TERM CURRENT USE OF INSULIN: ICD-10-CM

## 2022-06-13 DIAGNOSIS — K21.9 GASTROESOPHAGEAL REFLUX DISEASE, UNSPECIFIED WHETHER ESOPHAGITIS PRESENT: ICD-10-CM

## 2022-06-13 DIAGNOSIS — I73.9 PAD (PERIPHERAL ARTERY DISEASE): ICD-10-CM

## 2022-06-13 DIAGNOSIS — I65.23 ATHEROSCLEROSIS OF BOTH CAROTID ARTERIES: ICD-10-CM

## 2022-06-13 DIAGNOSIS — Z00.00 ENCOUNTER FOR PREVENTIVE HEALTH EXAMINATION: Primary | ICD-10-CM

## 2022-06-13 DIAGNOSIS — N18.31 DIABETES MELLITUS DUE TO UNDERLYING CONDITION WITH STAGE 3A CHRONIC KIDNEY DISEASE, WITH LONG-TERM CURRENT USE OF INSULIN: ICD-10-CM

## 2022-06-13 DIAGNOSIS — Z79.01 LONG TERM (CURRENT) USE OF ANTICOAGULANTS: ICD-10-CM

## 2022-06-13 DIAGNOSIS — I70.0 AORTIC ATHEROSCLEROSIS: ICD-10-CM

## 2022-06-13 DIAGNOSIS — F13.20 SEDATIVE DEPENDENCE: ICD-10-CM

## 2022-06-13 DIAGNOSIS — Z79.4 TYPE 2 DIABETES MELLITUS WITH HYPERGLYCEMIA, WITH LONG-TERM CURRENT USE OF INSULIN: ICD-10-CM

## 2022-06-13 DIAGNOSIS — Z79.4 LONG-TERM INSULIN USE: ICD-10-CM

## 2022-06-13 DIAGNOSIS — M46.1 BILATERAL SACROILIITIS: ICD-10-CM

## 2022-06-13 DIAGNOSIS — E11.9 DM TYPE 2 WITHOUT RETINOPATHY: ICD-10-CM

## 2022-06-13 DIAGNOSIS — M51.36 DDD (DEGENERATIVE DISC DISEASE), LUMBAR: ICD-10-CM

## 2022-06-13 DIAGNOSIS — Z01.810 PREOP CARDIOVASCULAR EXAM: ICD-10-CM

## 2022-06-13 PROCEDURE — 1159F PR MEDICATION LIST DOCUMENTED IN MEDICAL RECORD: ICD-10-PCS | Mod: CPTII,S$GLB,, | Performed by: NURSE PRACTITIONER

## 2022-06-13 PROCEDURE — 1101F PR PT FALLS ASSESS DOC 0-1 FALLS W/OUT INJ PAST YR: ICD-10-PCS | Mod: CPTII,S$GLB,, | Performed by: INTERNAL MEDICINE

## 2022-06-13 PROCEDURE — 3051F HG A1C>EQUAL 7.0%<8.0%: CPT | Mod: CPTII,S$GLB,, | Performed by: NURSE PRACTITIONER

## 2022-06-13 PROCEDURE — 99999 PR PBB SHADOW E&M-EST. PATIENT-LVL V: CPT | Mod: PBBFAC,,, | Performed by: NURSE PRACTITIONER

## 2022-06-13 PROCEDURE — 3066F NEPHROPATHY DOC TX: CPT | Mod: CPTII,S$GLB,, | Performed by: NURSE PRACTITIONER

## 2022-06-13 PROCEDURE — 3078F PR MOST RECENT DIASTOLIC BLOOD PRESSURE < 80 MM HG: ICD-10-PCS | Mod: CPTII,S$GLB,, | Performed by: INTERNAL MEDICINE

## 2022-06-13 PROCEDURE — 1101F PR PT FALLS ASSESS DOC 0-1 FALLS W/OUT INJ PAST YR: ICD-10-PCS | Mod: CPTII,S$GLB,, | Performed by: NURSE PRACTITIONER

## 2022-06-13 PROCEDURE — 3074F SYST BP LT 130 MM HG: CPT | Mod: CPTII,S$GLB,, | Performed by: INTERNAL MEDICINE

## 2022-06-13 PROCEDURE — 3008F PR BODY MASS INDEX (BMI) DOCUMENTED: ICD-10-PCS | Mod: CPTII,S$GLB,, | Performed by: INTERNAL MEDICINE

## 2022-06-13 PROCEDURE — 1101F PT FALLS ASSESS-DOCD LE1/YR: CPT | Mod: CPTII,S$GLB,, | Performed by: INTERNAL MEDICINE

## 2022-06-13 PROCEDURE — G0439 PR MEDICARE ANNUAL WELLNESS SUBSEQUENT VISIT: ICD-10-PCS | Mod: S$GLB,,, | Performed by: NURSE PRACTITIONER

## 2022-06-13 PROCEDURE — 1170F FXNL STATUS ASSESSED: CPT | Mod: CPTII,S$GLB,, | Performed by: NURSE PRACTITIONER

## 2022-06-13 PROCEDURE — 99999 PR PBB SHADOW E&M-EST. PATIENT-LVL V: ICD-10-PCS | Mod: PBBFAC,,, | Performed by: NURSE PRACTITIONER

## 2022-06-13 PROCEDURE — 99499 RISK ADDL DX/OHS AUDIT: ICD-10-PCS | Mod: S$GLB,,, | Performed by: NURSE PRACTITIONER

## 2022-06-13 PROCEDURE — 3060F POS MICROALBUMINURIA REV: CPT | Mod: CPTII,S$GLB,, | Performed by: NURSE PRACTITIONER

## 2022-06-13 PROCEDURE — 1159F MED LIST DOCD IN RCRD: CPT | Mod: CPTII,S$GLB,, | Performed by: INTERNAL MEDICINE

## 2022-06-13 PROCEDURE — 3051F PR MOST RECENT HEMOGLOBIN A1C LEVEL 7.0 - < 8.0%: ICD-10-PCS | Mod: CPTII,S$GLB,, | Performed by: NURSE PRACTITIONER

## 2022-06-13 PROCEDURE — 3078F PR MOST RECENT DIASTOLIC BLOOD PRESSURE < 80 MM HG: ICD-10-PCS | Mod: CPTII,S$GLB,, | Performed by: NURSE PRACTITIONER

## 2022-06-13 PROCEDURE — 1160F RVW MEDS BY RX/DR IN RCRD: CPT | Mod: CPTII,S$GLB,, | Performed by: INTERNAL MEDICINE

## 2022-06-13 PROCEDURE — 3074F PR MOST RECENT SYSTOLIC BLOOD PRESSURE < 130 MM HG: ICD-10-PCS | Mod: CPTII,S$GLB,, | Performed by: INTERNAL MEDICINE

## 2022-06-13 PROCEDURE — 3288F FALL RISK ASSESSMENT DOCD: CPT | Mod: CPTII,S$GLB,, | Performed by: NURSE PRACTITIONER

## 2022-06-13 PROCEDURE — 99999 PR PBB SHADOW E&M-EST. PATIENT-LVL V: ICD-10-PCS | Mod: PBBFAC,,, | Performed by: INTERNAL MEDICINE

## 2022-06-13 PROCEDURE — 3288F PR FALLS RISK ASSESSMENT DOCUMENTED: ICD-10-PCS | Mod: CPTII,S$GLB,, | Performed by: INTERNAL MEDICINE

## 2022-06-13 PROCEDURE — 1160F PR REVIEW ALL MEDS BY PRESCRIBER/CLIN PHARMACIST DOCUMENTED: ICD-10-PCS | Mod: CPTII,S$GLB,, | Performed by: NURSE PRACTITIONER

## 2022-06-13 PROCEDURE — 3051F HG A1C>EQUAL 7.0%<8.0%: CPT | Mod: CPTII,S$GLB,, | Performed by: INTERNAL MEDICINE

## 2022-06-13 PROCEDURE — 3060F PR POS MICROALBUMINURIA RESULT DOCUMENTED/REVIEW: ICD-10-PCS | Mod: CPTII,S$GLB,, | Performed by: INTERNAL MEDICINE

## 2022-06-13 PROCEDURE — 1159F MED LIST DOCD IN RCRD: CPT | Mod: CPTII,S$GLB,, | Performed by: NURSE PRACTITIONER

## 2022-06-13 PROCEDURE — 3078F DIAST BP <80 MM HG: CPT | Mod: CPTII,S$GLB,, | Performed by: INTERNAL MEDICINE

## 2022-06-13 PROCEDURE — 1125F AMNT PAIN NOTED PAIN PRSNT: CPT | Mod: CPTII,S$GLB,, | Performed by: NURSE PRACTITIONER

## 2022-06-13 PROCEDURE — 3008F BODY MASS INDEX DOCD: CPT | Mod: CPTII,S$GLB,, | Performed by: NURSE PRACTITIONER

## 2022-06-13 PROCEDURE — 99499 UNLISTED E&M SERVICE: CPT | Mod: S$GLB,,, | Performed by: NURSE PRACTITIONER

## 2022-06-13 PROCEDURE — 1125F AMNT PAIN NOTED PAIN PRSNT: CPT | Mod: CPTII,S$GLB,, | Performed by: INTERNAL MEDICINE

## 2022-06-13 PROCEDURE — 99214 PR OFFICE/OUTPT VISIT, EST, LEVL IV, 30-39 MIN: ICD-10-PCS | Mod: S$GLB,,, | Performed by: INTERNAL MEDICINE

## 2022-06-13 PROCEDURE — 3060F POS MICROALBUMINURIA REV: CPT | Mod: CPTII,S$GLB,, | Performed by: INTERNAL MEDICINE

## 2022-06-13 PROCEDURE — 1125F PR PAIN SEVERITY QUANTIFIED, PAIN PRESENT: ICD-10-PCS | Mod: CPTII,S$GLB,, | Performed by: NURSE PRACTITIONER

## 2022-06-13 PROCEDURE — 3008F PR BODY MASS INDEX (BMI) DOCUMENTED: ICD-10-PCS | Mod: CPTII,S$GLB,, | Performed by: NURSE PRACTITIONER

## 2022-06-13 PROCEDURE — 3051F PR MOST RECENT HEMOGLOBIN A1C LEVEL 7.0 - < 8.0%: ICD-10-PCS | Mod: CPTII,S$GLB,, | Performed by: INTERNAL MEDICINE

## 2022-06-13 PROCEDURE — 1125F PR PAIN SEVERITY QUANTIFIED, PAIN PRESENT: ICD-10-PCS | Mod: CPTII,S$GLB,, | Performed by: INTERNAL MEDICINE

## 2022-06-13 PROCEDURE — 3066F PR DOCUMENTATION OF TREATMENT FOR NEPHROPATHY: ICD-10-PCS | Mod: CPTII,S$GLB,, | Performed by: NURSE PRACTITIONER

## 2022-06-13 PROCEDURE — 3066F PR DOCUMENTATION OF TREATMENT FOR NEPHROPATHY: ICD-10-PCS | Mod: CPTII,S$GLB,, | Performed by: INTERNAL MEDICINE

## 2022-06-13 PROCEDURE — 1160F RVW MEDS BY RX/DR IN RCRD: CPT | Mod: CPTII,S$GLB,, | Performed by: NURSE PRACTITIONER

## 2022-06-13 PROCEDURE — 1160F PR REVIEW ALL MEDS BY PRESCRIBER/CLIN PHARMACIST DOCUMENTED: ICD-10-PCS | Mod: CPTII,S$GLB,, | Performed by: INTERNAL MEDICINE

## 2022-06-13 PROCEDURE — G0439 PPPS, SUBSEQ VISIT: HCPCS | Mod: S$GLB,,, | Performed by: NURSE PRACTITIONER

## 2022-06-13 PROCEDURE — 3074F PR MOST RECENT SYSTOLIC BLOOD PRESSURE < 130 MM HG: ICD-10-PCS | Mod: CPTII,S$GLB,, | Performed by: NURSE PRACTITIONER

## 2022-06-13 PROCEDURE — 3072F LOW RISK FOR RETINOPATHY: CPT | Mod: CPTII,S$GLB,, | Performed by: INTERNAL MEDICINE

## 2022-06-13 PROCEDURE — 3072F PR LOW RISK FOR RETINOPATHY: ICD-10-PCS | Mod: CPTII,S$GLB,, | Performed by: NURSE PRACTITIONER

## 2022-06-13 PROCEDURE — 3078F DIAST BP <80 MM HG: CPT | Mod: CPTII,S$GLB,, | Performed by: NURSE PRACTITIONER

## 2022-06-13 PROCEDURE — 99999 PR PBB SHADOW E&M-EST. PATIENT-LVL V: CPT | Mod: PBBFAC,,, | Performed by: INTERNAL MEDICINE

## 2022-06-13 PROCEDURE — 3074F SYST BP LT 130 MM HG: CPT | Mod: CPTII,S$GLB,, | Performed by: NURSE PRACTITIONER

## 2022-06-13 PROCEDURE — 3072F PR LOW RISK FOR RETINOPATHY: ICD-10-PCS | Mod: CPTII,S$GLB,, | Performed by: INTERNAL MEDICINE

## 2022-06-13 PROCEDURE — 1159F PR MEDICATION LIST DOCUMENTED IN MEDICAL RECORD: ICD-10-PCS | Mod: CPTII,S$GLB,, | Performed by: INTERNAL MEDICINE

## 2022-06-13 PROCEDURE — 3060F PR POS MICROALBUMINURIA RESULT DOCUMENTED/REVIEW: ICD-10-PCS | Mod: CPTII,S$GLB,, | Performed by: NURSE PRACTITIONER

## 2022-06-13 PROCEDURE — 1101F PT FALLS ASSESS-DOCD LE1/YR: CPT | Mod: CPTII,S$GLB,, | Performed by: NURSE PRACTITIONER

## 2022-06-13 PROCEDURE — 3288F FALL RISK ASSESSMENT DOCD: CPT | Mod: CPTII,S$GLB,, | Performed by: INTERNAL MEDICINE

## 2022-06-13 PROCEDURE — 3008F BODY MASS INDEX DOCD: CPT | Mod: CPTII,S$GLB,, | Performed by: INTERNAL MEDICINE

## 2022-06-13 PROCEDURE — 3066F NEPHROPATHY DOC TX: CPT | Mod: CPTII,S$GLB,, | Performed by: INTERNAL MEDICINE

## 2022-06-13 PROCEDURE — 3288F PR FALLS RISK ASSESSMENT DOCUMENTED: ICD-10-PCS | Mod: CPTII,S$GLB,, | Performed by: NURSE PRACTITIONER

## 2022-06-13 PROCEDURE — 99214 OFFICE O/P EST MOD 30 MIN: CPT | Mod: S$GLB,,, | Performed by: INTERNAL MEDICINE

## 2022-06-13 PROCEDURE — 3072F LOW RISK FOR RETINOPATHY: CPT | Mod: CPTII,S$GLB,, | Performed by: NURSE PRACTITIONER

## 2022-06-13 PROCEDURE — 1170F PR FUNCTIONAL STATUS ASSESSED: ICD-10-PCS | Mod: CPTII,S$GLB,, | Performed by: NURSE PRACTITIONER

## 2022-06-13 RX ORDER — OMEGA-3-ACID ETHYL ESTERS 1 G/1
2 CAPSULE, LIQUID FILLED ORAL 2 TIMES DAILY
Qty: 360 CAPSULE | Refills: 3 | Status: SHIPPED | OUTPATIENT
Start: 2022-06-13 | End: 2023-06-19 | Stop reason: SDUPTHER

## 2022-06-13 NOTE — TELEPHONE ENCOUNTER
No new care gaps identified.  [facility] Embedded Care Gaps. Reference number: [id number]. 6/13/2022   7:55:21 AM CDT

## 2022-06-13 NOTE — PROGRESS NOTES
"  Driss Hernandez presented for a  Medicare AWV and comprehensive Health Risk Assessment today. The following components were reviewed and updated:    · Medical history  · Family History  · Social history  · Allergies and Current Medications  · Health Risk Assessment  · Health Maintenance  · Care Team       ** See Completed Assessments for Annual Wellness Visit within the encounter summary.**       The following assessments were completed:  · Living Situation  · CAGE  · Depression Screening  · Timed Get Up and Go  · Whisper Test  · Cognitive Function Screening  · Nutrition Screening  · ADL Screening  · PAQ Screening          Vitals:    06/13/22 1039   BP: 120/64   BP Location: Right arm   Patient Position: Sitting   BP Method: Medium (Manual)   Pulse: 65   SpO2: 99%   Weight: 84.1 kg (185 lb 8.3 oz)   Height: 5' 7" (1.702 m)     Body mass index is 29.06 kg/m².  Physical Exam  Vitals and nursing note reviewed.   Constitutional:       Appearance: Normal appearance.   Cardiovascular:      Rate and Rhythm: Normal rate.      Pulses: Normal pulses.      Heart sounds: Normal heart sounds.   Pulmonary:      Effort: Pulmonary effort is normal.      Breath sounds: Normal breath sounds.   Abdominal:      General: Bowel sounds are normal.      Palpations: Abdomen is soft.   Musculoskeletal:         General: Normal range of motion.   Neurological:      Mental Status: He is alert and oriented to person, place, and time.   Psychiatric:         Mood and Affect: Mood normal.         Behavior: Behavior normal.               Diagnoses and health risks identified today and associated recommendations/orders:    1. Encounter for preventive health examination  Pt was seen today for an Annual Wellness visit. Healthcare maintenance and screening recommendations were discussed and updated as indicated. Return in one year for AWV.    Review current opioid prescriptions: yes  Screen for potential Substance Use Disorders: yes    2. Diabetes " mellitus with neurological manifestations, uncontrolled  3. Type 2 diabetes mellitus with hyperglycemia, with long-term current use of insulin  4. DM type 2 without retinopathy  5. Diabetes mellitus due to underlying condition with stage 3a chronic kidney disease, with long-term current use of insulin  6. Diabetic polyneuropathy associated with type 2 diabetes mellitus  7. Long-term insulin use  Hgb A1c not at goal. Hgb A1C 7.4 on 5/11/22 (trending down). Reports home fasting cbg 120 - 170s. Discussed Hgb A1C and home fasting cbg goals, diet, activity. The current medical regimen is effective;  continue present plan and medications.    8. Stage 3a chronic kidney disease  The current medical regimen is effective;  continue present plan and medications.    9. Sedative dependence  The current medical regimen is effective;  continue present plan and medications.    10. Aortic atherosclerosis  The current medical regimen is effective;  continue present plan and medications.    11. Longstanding persistent atrial fibrillation  The current medical regimen is effective;  continue present plan and medications.    12. Coronary artery disease involving coronary bypass graft of native heart with unstable angina pectoris  The current medical regimen is effective;  continue present plan and medications.    13. PAD (peripheral artery disease)  The current medical regimen is effective;  continue present plan and medications.    14. Bilateral sacroiliitis  The current medical regimen is effective;  continue present plan and medications.    15. Atherosclerosis of both carotid arteries  The current medical regimen is effective;  continue present plan and medications.    16. Essential hypertension  Stable. The current medical regimen is effective;  continue present plan and medications.    17. Mixed hyperlipidemia  The current medical regimen is effective;  continue present plan and medications.    18. DDD (degenerative disc disease),  lumbar  The current medical regimen is effective;  continue present plan and medications.    19. Gastroesophageal reflux disease, unspecified whether esophagitis present  The current medical regimen is effective;  continue present plan and medications.    20. Chest pain, atypical  The current medical regimen is effective;  continue present plan and medications.        Provided Driss with a 5-10 year written screening schedule and personal prevention plan. Recommendations were developed using the USPSTF age appropriate recommendations. Education, counseling, and referrals were provided as needed. After Visit Summary printed and given to patient which includes a list of additional screenings\tests needed.    Follow up in about 1 year (around 6/7/2023) with provider.    GEOVANNI Aggarwal  I offered to discuss advanced care planning, including how to pick a person who would make decisions for you if you were unable to make them for yourself, called a health care power of , and what kind of decisions you might make such as use of life sustaining treatments such as ventilators and tube feeding when faced with a life limiting illness recorded on a living will that they will need to know. (How you want to be cared for as you near the end of your natural life)     X Patient is interested in learning more about how to make advanced directives.  I provided them paperwork and offered to discuss this with them.

## 2022-06-13 NOTE — PROGRESS NOTES
CARDIOVASCULAR PROGRESS NOTE    REASON FOR CONSULT:   Driss Hernandez Jr. is a 71 y.o. male who presents for follow up of CAD, HFpEF.    PCP: Ehrensing  Ortho: [facility]  HISTORY OF PRESENT ILLNESS:   Patient returns for follow-up.  He denies intercurrent angina or dyspnea.  He is telling me he is feeling quite well.  He has not yet had a shoulder surgery and tells me his exercise capacity is stable, and at his previous office visit he was able to climb a flight of stairs without any symptoms and is currently feeling better.  He otherwise had no palpitations or syncope.  There has been no PND, orthopnea, melena, hematuria, or claudicant symptoms.  He continues take his Eliquis without any issues.  He tells me his lower extremity edema has essentially resolved.    CARDIOVASCULAR HISTORY:   CAD 2016 CABG x3 (LIMA-LAD, SVG-D, SVG-PDA)   11/11/20 cath with diffuse native disease, patent SVG x2, LIMA-LAD atretic   12/10/20: PCI OM2 2.25x26 [facility] [place]    HFpEF    PAD    Chr AF on eliquis 5mg bid    PAST MEDICAL HISTORY:     Past Medical History:   Diagnosis Date    Chronic midline low back pain without sciatica 10/2/2017    Colon polyps     Coronary artery disease involving native coronary artery of native heart 3/5/2020    Coronary artery disease involving native coronary artery of native heart with angina pectoris 12/10/2020    Diabetes mellitus with neurological manifestations, uncontrolled 1/24/2017    Diabetic polyneuropathy associated with type 2 diabetes mellitus 1/24/2017    Diabetic polyneuropathy associated with type 2 diabetes mellitus     Essential hypertension 1/24/2017    Gastroesophageal reflux disease 1/24/2017    Hyperlipidemia 1/24/2017    Insomnia 1/24/2017    Long-term insulin use 1/24/2017    Longstanding persistent atrial fibrillation 12/30/2020    Lumbar spondylosis 11/13/2017    Nuclear sclerosis of both eyes 8/24/2017    Obesity     PAD (peripheral artery disease)  3/23/2022    Stage 3 chronic kidney disease 2/13/2019    Uncontrolled type 2 diabetes mellitus without complication, with long-term current use of insulin 1/24/2017       PAST SURGICAL HISTORY:     Past Surgical History:   Procedure Laterality Date    BACK SURGERY      2002    CATARACT EXTRACTION W/  INTRAOCULAR LENS IMPLANT Right 08/29/2017    Dr. Hong    CATARACT EXTRACTION W/  INTRAOCULAR LENS IMPLANT Left 02/24/2022    Dr. Hong    COLONOSCOPY N/A 2/21/2017    Procedure: COLONOSCOPY;  Surgeon: Robb Rosado MD;  Location: [facility] ENDO;  Service: Endoscopy;  Laterality: N/A;    CORONARY ANGIOGRAPHY INCLUDING BYPASS GRAFTS WITH CATHETERIZATION OF LEFT HEART N/A 11/11/2020    Procedure: ANGIOGRAM, CORONARY, INCLUDING BYPASS GRAFT, WITH LEFT HEART CATHETERIZATION;  Surgeon: Lane Cuellar MD;  Location: [facility] CATH LAB;  Service: Cardiology;  Laterality: N/A;  RN PRE OP 11-5-2020. --COVID NEGATIVE ON  11-. C A    CORONARY ARTERY BYPASS GRAFT      March 2016    EPIDURAL STEROID INJECTION Bilateral 11/14/2018    Procedure: Lumbar Medial Branch Blocks;  Surgeon: Eze Byrd Jr., MD;  Location: [facility];  Service: Pain Management;  Laterality: Bilateral;  Bilateral Lumbar Medial Branch Blocks L2, L3, L4, L5    [id number]  [id number]    Arrive @ 1150; NO Sedation    EPIDURAL STEROID INJECTION Bilateral 11/28/2018    Procedure: Injection, Steroid, Epidural;  Surgeon: Eze Byrd Jr., MD;  Location: [facility];  Service: Pain Management;  Laterality: Bilateral;  Bilateral Sacroiliac Joint Steroid Injections    11517    Arrive @ 0910    EPIDURAL STEROID INJECTION Bilateral 3/20/2019    Procedure: Injection, Steroid, Sacroiliiac Joint;  Surgeon: Eze Byrd Jr., MD;  Location: [facility];  Service: Pain Management;  Laterality: Bilateral;  Bilateral Sacroiliac Joint Steroid Injections    67251    Arrive @ 1145; Trigger point injections also?    INTRAOCULAR PROSTHESES INSERTION Left  "2/24/2022    Procedure: INSERTION, IOL PROSTHESIS;  Surgeon: Nickolas Hong MD;  Location: [facility] OR;  Service: Ophthalmology;  Laterality: Left;    PHACOEMULSIFICATION OF CATARACT Left 2/24/2022    Procedure: PHACOEMULSIFICATION, CATARACT;  Surgeon: Nickolas Hong MD;  Location: [facility] OR;  Service: Ophthalmology;  Laterality: Left;  RN Phone Pre Op 2-16-22.  Covid NEGATIVE  2-23-22.  Arrival 08:00 am.    TONSILLECTOMY         ALLERGIES AND MEDICATION:     Review of patient's allergies indicates:   Allergen Reactions    Penicillins Other (See Comments)     Unknown reaction, Had a reaction as a child    Shrimp Itching     Hand itching        Medication List          Accurate as of June 13, 2022  9:22 AM. If you have any questions, ask your nurse or doctor.            CONTINUE taking these medications    ACCU-CHEK MOIZ CONTROL SOLN Soln  Generic drug: blood glucose control high,low     ACCU-CHEK MOIZ PLUS TEST STRP Strp  Generic drug: blood sugar diagnostic  TEST THREE TIMES DAILY     ACCU-CHEK SOFTCLIX LANCETS Misc  Generic drug: lancets     albuterol 90 mcg/actuation inhaler  Commonly known as: PROVENTIL/VENTOLIN HFA  Inhale 2 puffs into the lungs every 4 (four) hours as needed for Shortness of Breath. Rescue     amLODIPine 5 MG tablet  Commonly known as: NORVASC  Take 1 tablet (5 mg total) by mouth once daily.     ascorbic acid (vitamin C) 100 MG tablet  Commonly known as: VITAMIN C     atorvastatin 80 MG tablet  Commonly known as: LIPITOR  Take 1 tablet (80 mg total) by mouth every evening.     b complex vitamins tablet     BD ALCOHOL SWABS Padm  Generic drug: alcohol swabs     BD ULTRA-FINE ROLAND PEN NEEDLE 32 gauge x 5/32" Ndle  Generic drug: pen needle, diabetic     celecoxib 100 MG capsule  Commonly known as: CeleBREX  Take 1 capsule (100 mg total) by mouth once daily.     clopidogreL 75 mg tablet  Commonly known as: PLAVIX  Take 1 tablet (75 mg total) by mouth once daily.     dapagliflozin 5 " mg Tab tablet  Commonly known as: FARXIGA  Take 1 tablet (5 mg total) by mouth once daily.     DurezoL 0.05 % Drop ophthalmic solution  Generic drug: difluprednate     ELIQUIS 5 mg Tab  Generic drug: apixaban  TAKE 1 TABLET TWICE DAILY     ergocalciferol 50,000 unit Cap  Commonly known as: ERGOCALCIFEROL  TAKE ONE CAPSULE BY MOUTH WEEKLY     fenofibrate 160 MG Tab  TAKE 1 TABLET EVERY MORNING     fentaNYL 50 mcg/mL injection  Commonly known as: SUBLIMAZE     furosemide 20 MG tablet  Commonly known as: LASIX  Take 1 tablet (20 mg total) by mouth 2 (two) times daily.     gabapentin 100 MG capsule  Commonly known as: NEURONTIN  TAKE 2 CAPSULES EVERY MORNING AND TAKE 3 CAPSULES EVERY DAY WITH DINNER     glimepiride 2 MG tablet  Commonly known as: AMARYL  TAKE 1 TABLET EVERY DAY BEFORE BREAKFAST     hydrALAZINE 50 MG tablet  Commonly known as: APRESOLINE  Take 1 tablet (50 mg total) by mouth 2 (two) times a day.     isosorbide mononitrate 120 MG 24 hr tablet  Commonly known as: IMDUR  Take 2 tablets (240 mg total) by mouth once daily.     lactated ringers infusion     magnesium 250 mg Tab     metFORMIN 500 MG ER 24hr tablet  Commonly known as: GLUCOPHAGE-XR  Take 2 tablets twice daily with food     nitroGLYCERIN 0.4 MG SL tablet  Commonly known as: NITROSTAT  Place 1 tablet (0.4 mg total) under the tongue every 5 (five) minutes as needed for Chest pain.     omega-3 acid ethyl esters 1 gram capsule  Commonly known as: LOVAZA  Take 2 capsules (2 g total) by mouth 2 (two) times daily.     OMIDRIA 1-0.3 % 500 mL irrigation  Generic drug: phenylephrine-ketorolac     pantoprazole 40 MG tablet  Commonly known as: PROTONIX  TAKE 1 TABLET EVERY DAY     semaglutide 1 mg/dose (4 mg/3 mL)  Commonly known as: OZEMPIC  Inject 1 mg into the skin every 7 days.     tiZANidine 4 MG tablet  Commonly known as: ZANAFLEX     TRESIBA FLEXTOUCH U-100 100 unit/mL (3 mL) insulin pen  Generic drug: insulin degludec  Inject 30 Units into the skin  once daily.     triazolam 0.25 MG Tab  Commonly known as: HALCION  TK 1 T PO QHS, prn            SOCIAL HISTORY:     Social History     Socioeconomic History    Marital status:    Tobacco Use    Smoking status: Never Smoker    Smokeless tobacco: Never Used   Substance and Sexual Activity    Alcohol use: Yes     Comment: SOCIALLY    Drug use: No    Sexual activity: Yes     Partners: Female     Social Determinants of Health     Financial Resource Strain: Low Risk     Difficulty of Paying Living Expenses: Not hard at all   Food Insecurity: No Food Insecurity    Worried About Running Out of Food in the Last Year: Never true    Ran Out of Food in the Last Year: Never true   Transportation Needs: No Transportation Needs    Lack of Transportation (Medical): No    Lack of Transportation (Non-Medical): No   Physical Activity: Sufficiently Active    Days of Exercise per Week: 3 days    Minutes of Exercise per Session: 60 min   Stress: No Stress Concern Present    Feeling of Stress : Only a little   Social Connections: Unknown    Frequency of Communication with Friends and Family: More than three times a week    Frequency of Social Gatherings with Friends and Family: Three times a week    Active Member of Clubs or Organizations: Yes    Attends Club or Organization Meetings: More than 4 times per year    Marital Status:    Housing Stability: Low Risk     Unable to Pay for Housing in the Last Year: No    Number of Places Lived in the Last Year: 1    Unstable Housing in the Last Year: No       FAMILY HISTORY:     Family History   Problem Relation Age of Onset    Depression Mother     Heart disease Mother     Stroke Father     No Known Problems Sister     Blindness Brother     Diabetes Sister     No Known Problems Sister     No Known Problems Maternal Aunt     No Known Problems Maternal Uncle     No Known Problems Paternal Aunt     No Known Problems Paternal Uncle     No Known  "Problems Maternal Grandmother     No Known Problems Maternal Grandfather     No Known Problems Paternal Grandmother     No Known Problems Paternal Grandfather     Amblyopia Neg Hx     Cancer Neg Hx     Cataracts Neg Hx     Glaucoma Neg Hx     Hypertension Neg Hx     Macular degeneration Neg Hx     Retinal detachment Neg Hx     Strabismus Neg Hx     Thyroid disease Neg Hx        REVIEW OF SYSTEMS:   Review of Systems   Constitutional: Negative for chills, diaphoresis and fever.   HENT: Negative for nosebleeds.    Eyes: Negative for blurred vision, double vision and photophobia.   Respiratory: Negative for cough, hemoptysis, shortness of breath and wheezing.    Cardiovascular: Negative for chest pain, palpitations, orthopnea, claudication, leg swelling and PND.   Gastrointestinal: Negative for abdominal pain, blood in stool, heartburn, melena, nausea and vomiting.   Genitourinary: Negative for flank pain and hematuria.   Musculoskeletal: Positive for joint pain (R shoulder). Negative for falls, myalgias and neck pain.   Skin: Negative for rash.   Neurological: Negative for dizziness, seizures, loss of consciousness, weakness and headaches.   Endo/Heme/Allergies: Negative for polydipsia. Does not bruise/bleed easily.   Psychiatric/Behavioral: Negative for depression and memory loss. The patient is not nervous/anxious.        PHYSICAL EXAM:     Vitals:    06/13/22 0917   BP: 120/64   Pulse: 65   Resp: 15    Body mass index is 29.04 kg/m².  Weight: 84.1 kg (185 lb 6.5 oz)   Height: 5' 7" (170.2 cm)     Physical Exam  Vitals reviewed.   Constitutional:       General: He is not in acute distress.     Appearance: He is well-developed. He is not ill-appearing, toxic-appearing or diaphoretic.   HENT:      Head: Normocephalic and atraumatic.   Eyes:      General: No scleral icterus.     Extraocular Movements: Extraocular movements intact.      Conjunctiva/sclera: Conjunctivae normal.      Pupils: Pupils are equal, " round, and reactive to light.   Neck:      Thyroid: No thyromegaly.      Vascular: Normal carotid pulses. No carotid bruit or JVD.      Trachea: Trachea normal.   Cardiovascular:      Rate and Rhythm: Bradycardia present. Rhythm irregularly irregular.      Heart sounds: S1 normal and S2 normal. Heart sounds are distant. No murmur heard.    No friction rub. No gallop.   Pulmonary:      Effort: Pulmonary effort is normal. No respiratory distress.      Breath sounds: Normal breath sounds. No stridor. No wheezing, rhonchi or rales.   Chest:      Chest wall: No tenderness.   Abdominal:      General: There is no distension.      Palpations: Abdomen is soft.   Musculoskeletal:         General: No swelling or tenderness. Normal range of motion.      Cervical back: Normal range of motion and neck supple. No edema or rigidity.      Right lower leg: No edema.      Left lower leg: No edema.   Feet:      Right foot:      Skin integrity: No ulcer.      Left foot:      Skin integrity: No ulcer.   Skin:     General: Skin is warm and dry.      Coloration: Skin is not jaundiced.   Neurological:      General: No focal deficit present.      Mental Status: He is alert and oriented to person, place, and time.      Cranial Nerves: No cranial nerve deficit.   Psychiatric:         Mood and Affect: Mood normal.         Speech: Speech normal.         Behavior: Behavior normal. Behavior is cooperative.         DATA:   EKG: (personally reviewed tracing)  3/23/22 AF 74, NSSTTW changes    Laboratory:  CBC:  Recent Labs   Lab 12/07/20  1200 03/30/22  0908 06/09/22  0826   WBC 5.82 4.72 5.56   Hemoglobin 10.6 L 9.5 L 10.8 L   Hematocrit 33.8 L 32.0 L 36.7 L   Platelets 231 188 319       CHEMISTRIES:  Recent Labs   Lab 04/20/22  0715 04/28/22  0923 05/11/22  0722 06/09/22  0826   Glucose 177 H 146 H  --  126 H   Sodium 140 141  --  143   Potassium 4.0 4.9  --  4.9   BUN 32 H 30 H  --  33 H   Creatinine 1.5 H 1.5 H 1.5 H 1.5 H   eGFR if African  American 53 A 53 A 53.0 A 53.0 A   eGFR if non  46 A 46 A 45.9 A 45.9 A   Calcium 10.1 9.7  --  10.2       CARDIAC BIOMARKERS:        COAGS:  Recent Labs   Lab 04/01/21  0033 04/21/21  0921 05/10/21  0945   INR 2.4 H 2.0 H 2.9 H       LIPIDS/LFTS:  Recent Labs   Lab 08/18/20  0928 02/10/22  1014 05/11/22  0722 06/09/22  0826   Cholesterol 137 163 123  --    Triglycerides 125 123 116  --    HDL 25 L 27 L 22 L  --    LDL Cholesterol 87.0 111.4 77.8  --    Non-HDL Cholesterol 112 136 101  --    AST 18  --   --  19   ALT 18  --   --  13       Cardiovascular Testing:  LE venous US 4/20/22  · There is no evidence of a right lower extremity DVT.  · There is no evidence of a left lower extremity DVT.  · 1.2x0.6cm non vascular structure noted in left proximal calf.    Echo 3/30/22  · The left ventricle is normal in size with mild concentric hypertrophy and normal systolic function.  · The estimated ejection fraction is 60%.  · Mild right ventricular enlargement with low normal right ventricular systolic function.  · Mild mitral regurgitation.  · Mild tricuspid regurgitation.  · The estimated PA systolic pressure is 22 mmHg.  · Atrial fibrillation observed.    Carotid US 3/30/22  · There is 40-49% right Internal Carotid Stenosis.  · There is 60-69% left Internal Carotid Stenosis.  · >50% bilateral ECA stenosis.  · Compared with prior report dated 4/18/18, ICA stenosis has progressed bilaterally.    Holter 5/19/21  · Atrial fibrillation. Heart rates varied between 31 and 112 bpm with an average of 57 bpm.  · There were rare PVCs totalling 100 and averaging 4.17 per hour.  · Longest RR interval 3.3 seconds    L MPI 5/19/21    Abnormal myocardial perfusion scan.    There is a mild intensity, fixed defect consistent with scar in the anterior wall.    There is a second moderate intensity, reversible defect that is consistent with ischemia in the inferior wall.    The gated perfusion images showed an ejection  fraction of 61% post stress.    The EKG portion of this study is negative for ischemia.    The patient reported no chest pain during the stress test.    There were no arrhythmias during stress.    PCI OM1 12/10/20 (images personally reviewed and interpreted)  1.  90% mid OM2 stenosis.  2.  Successful PCI with placement of a 2.25 x 26 mm drug-eluting stent reducing the 90% stenosis to 0%.  INGRIS 3 flow.  No dissection.  Good angiographic results.    Cath 11/11/20 (images personally reviewed and interpreted)  Left Main   The vessel is angiographically normal.   Left Anterior Descending   Subtotal mid occlusion. Diffusely diseased distal vessel   Left Circumflex   Long mid 80% between OM1 and OM2   First Obtuse Marginal Branch   90% mid   Second Obtuse Marginal Branch   80% mid   Right Coronary Artery   80% mid - moderate disffuse distal disease   LIMA Graft To Mid LAD   Mid to distal graft diffusely diseased 0 multile 80-90% lesions. Small diffusely diseased distal LAD   Saphenous Graft To RPDA   Patent with good runoff to PDA   Graft To 1st Diag   Patent to D1 with good runoff     Ex NUSRAT 4/18/18  Resting NUSRAT:   The right ankle brachial index was 1.04 which is normal.   The left ankle brachial index was 1.08 which is normal.   The right TBI is 0.55.   The left TBI is 0.98.   Exercise NUSRAT:   Post exercise right ankle pressure was 113 mmHg, resulting in a right post-exercise NUSRAT of 0.67.   Post exercise left ankle pressure was 150 mmHg, resulting in a left post-exercise NUSRAT of 0.89.       ASSESSMENT:   # CAD s/p CABG/PCI OM 12/2020, MAYEN and cough much improved.   # HTN, controlled  # HFpEF, euvolemic, still taking 40mg bid of lasix.  # Chr AF on eliquis 5mg bid, HR controlled  # HLP on atorva 40mg  # PAD, abnl NUSRAT 4/18/18  # carotid atherosclerosis (CUS 3/2022)  # L>R LE edema, resolved.  LE venous US 4/2022 neg.  # aortic atherosclerosis (CT Chest 10/29/18)  # preop for R shoulder ortho procedure.  The patient  demonstrated good exercise capacity office previously and is feeling even better today.    PLAN:   Cont med rx  The patient is at low cardiac risk for the planned orthopedic surgical procedure and associated anesthesia.  No further preoperative cardiac testing is required.  If needed, it is acceptable hold the Eliquis for 3 days prior to surgery and resume it as soon as possible thereafter.  If needed, it is acceptable to hold the Plavix for 5 days prior to surgery and resume it as soon as possible thereafter.  Cont Plavix 75mg qd  Cont eliquis 5mg bid  Dec lasix 20mg bid, may have to accept some degree of azotemia to maintain euvolemia.  Stop amlod 5mg qd (?contributing to edema/vol overload)  Prev enrolled in digital med programs  RTC 3 months with surveillance carotid US and lipids/CMP (Sept 2022)      Eze Purdy MD, FACC

## 2022-06-13 NOTE — PATIENT INSTRUCTIONS
Counseling and Referral of Other Preventative  (Italic type indicates deductible and co-insurance are waived)    Patient Name: Driss Hernandez  Today's Date: 6/13/2022    Health Maintenance       Date Due Completion Date    TETANUS VACCINE Never done ---    Shingles Vaccine (1 of 2) Never done ---    COVID-19 Vaccine (4 - Booster for Pfizer series) 04/07/2022 12/7/2021    Hemoglobin A1c 11/11/2022 5/11/2022    Eye Exam 11/11/2022 11/11/2021    Override on 1/10/2017: Done (External eye doctor)    Diabetes Urine Screening 02/10/2023 2/10/2022    Foot Exam 02/18/2023 2/18/2022    Override on 3/11/2020: Done    Override on 3/13/2018: Done (patient deferred, reports it was done by Dr. Agrawal (endocrinologist))    Override on 3/6/2017: Done (completed by Endocrinology (Dr. Agrawal's office))    Colorectal Cancer Screening 02/21/2024 2/21/2017        No orders of the defined types were placed in this encounter.    The following information is provided to all patients.  This information is to help you find resources for any of the problems found today that may be affecting your health:                Living healthy guide: www.[facility].[place].gov      Understanding Diabetes: www.diabetes.org      Eating healthy: www.cdc.gov/healthyweight      CDC home safety checklist: www.cdc.gov/steadi/patient.html      Agency on Aging: www.goea.[place].gov      Alcoholics anonymous (AA): www.aa.org      Physical Activity: www.nam.nih.gov/yf1faep      Tobacco use: www.quitwithusla.org

## 2022-06-14 NOTE — TELEPHONE ENCOUNTER
Refill Authorization Note   Driss Hernandez  is requesting a refill authorization.  Brief Assessment and Rationale for Refill:  Approve     Medication Therapy Plan:       Medication Reconciliation Completed: No   Comments:     No Care Gaps recommended.     Note composed:7:31 PM 06/13/2022

## 2022-06-28 ENCOUNTER — ANESTHESIA EVENT (OUTPATIENT)
Dept: SURGERY | Facility: HOSPITAL | Age: 72
End: 2022-06-28
Payer: MEDICARE

## 2022-06-28 ENCOUNTER — HOSPITAL ENCOUNTER (OUTPATIENT)
Dept: PREADMISSION TESTING | Facility: HOSPITAL | Age: 72
Discharge: HOME | End: 2022-06-28
Attending: ORTHOPAEDIC SURGERY
Payer: MEDICARE

## 2022-06-28 VITALS
SYSTOLIC BLOOD PRESSURE: 116 MMHG | WEIGHT: 189.38 LBS | OXYGEN SATURATION: 98 % | DIASTOLIC BLOOD PRESSURE: 61 MMHG | BODY MASS INDEX: 29.72 KG/M2 | RESPIRATION RATE: 18 BRPM | TEMPERATURE: 98 F | HEIGHT: 67 IN

## 2022-06-28 RX ORDER — EPINEPHRINE 0.22MG
100 AEROSOL WITH ADAPTER (ML) INHALATION DAILY
COMMUNITY

## 2022-06-28 NOTE — ANESTHESIA PREPROCEDURE EVALUATION
06/28/2022  Driss Hernandez Jr. is a 71 y.o., male scheduled for  REPAIR, ROTATOR CUFF, ARTHROSCOPIC (Right Shoulder) - on 7/5/2022.    NPO >8  METS >4    Vitals:    07/05/22 0559   BP: (!) 141/68   Pulse: (!) 55   Resp: 16   Temp: 36.7 °C (98 °F)           Past Medical History:   Diagnosis Date    Chronic midline low back pain without sciatica 10/2/2017    Colon polyps     Coronary artery disease involving native coronary artery of native heart 3/5/2020    Coronary artery disease involving native coronary artery of native heart with angina pectoris 12/10/2020    Diabetes mellitus with neurological manifestations, uncontrolled 1/24/2017    Diabetic polyneuropathy associated with type 2 diabetes mellitus 1/24/2017    Diabetic polyneuropathy associated with type 2 diabetes mellitus     Essential hypertension 1/24/2017    Gastroesophageal reflux disease 1/24/2017    Hyperlipidemia 1/24/2017    Insomnia 1/24/2017    Long-term insulin use 1/24/2017    Longstanding persistent atrial fibrillation 12/30/2020    Lumbar spondylosis 11/13/2017    Nuclear sclerosis of both eyes 8/24/2017    Obesity     PAD (peripheral artery disease) 3/23/2022    Stage 3 chronic kidney disease 2/13/2019    Uncontrolled type 2 diabetes mellitus without complication, with long-term current use of insulin 1/24/2017       Past Surgical History:   Procedure Laterality Date    BACK SURGERY      2002    CATARACT EXTRACTION W/  INTRAOCULAR LENS IMPLANT Right 08/29/2017    Dr. Hong    CATARACT EXTRACTION W/  INTRAOCULAR LENS IMPLANT Left 02/24/2022    Dr. Hong    COLONOSCOPY N/A 2/21/2017    Procedure: COLONOSCOPY;  Surgeon: Robb Rosado MD;  Location: [facility];  Service: Endoscopy;  Laterality: N/A;    CORONARY ANGIOGRAPHY INCLUDING BYPASS GRAFTS WITH CATHETERIZATION OF LEFT HEART N/A 11/11/2020     Procedure: ANGIOGRAM, CORONARY, INCLUDING BYPASS GRAFT, WITH LEFT HEART CATHETERIZATION;  Surgeon: Lane Cuellar MD;  Location: [facility] CATH LAB;  Service: Cardiology;  Laterality: N/A;  RN PRE OP 11-5-2020. --COVID NEGATIVE ON  11-. C A    CORONARY ARTERY BYPASS GRAFT      March 2016    EPIDURAL STEROID INJECTION Bilateral 11/14/2018    Procedure: Lumbar Medial Branch Blocks;  Surgeon: Eze Byrd Jr., MD;  Location: [facility] ENDO;  Service: Pain Management;  Laterality: Bilateral;  Bilateral Lumbar Medial Branch Blocks L2, L3, L4, L5    62066  20079    Arrive @ 1150; NO Sedation    EPIDURAL STEROID INJECTION Bilateral 11/28/2018    Procedure: Injection, Steroid, Epidural;  Surgeon: Eze Byrd Jr., MD;  Location: [facility] ENDO;  Service: Pain Management;  Laterality: Bilateral;  Bilateral Sacroiliac Joint Steroid Injections    81725    Arrive @ 0910    EPIDURAL STEROID INJECTION Bilateral 3/20/2019    Procedure: Injection, Steroid, Sacroiliiac Joint;  Surgeon: Eze Byrd Jr., MD;  Location: [facility] ENDO;  Service: Pain Management;  Laterality: Bilateral;  Bilateral Sacroiliac Joint Steroid Injections    [id number]    Arrive @ 1145; Trigger point injections also?    INTRAOCULAR PROSTHESES INSERTION Left 2/24/2022    Procedure: INSERTION, IOL PROSTHESIS;  Surgeon: Nickolas Hong MD;  Location: [facility] OR;  Service: Ophthalmology;  Laterality: Left;    PHACOEMULSIFICATION OF CATARACT Left 2/24/2022    Procedure: PHACOEMULSIFICATION, CATARACT;  Surgeon: Nickolas Hong MD;  Location: [facility] OR;  Service: Ophthalmology;  Laterality: Left;  RN Phone Pre Op 2-16-22.  Covid NEGATIVE  2-23-22.  Arrival 08:00 am.    TONSILLECTOMY             Pre-op Assessment    I have reviewed the Patient Summary Reports.     I have reviewed the Nursing Notes. I have reviewed the NPO Status.   I have reviewed the Medications.     Review of Systems  Anesthesia Hx:  No problems with previous Anesthesia   History of prior surgery of interest to airway management or planning: Denies Family Hx of Anesthesia complications.   Denies Personal Hx of Anesthesia complications.   Social:  Non-Smoker, No Alcohol Use    Hematology/Oncology:  Hematology Normal   Oncology Normal     EENT/Dental:EENT/Dental Normal   Cardiovascular:   Exercise tolerance: good Hypertension CAD  CABG/stent Dysrhythmias atrial fibrillation Angina, at rest hyperlipidemia ECG has been reviewed. ECHO 3/23/22:  · The left ventricle is normal in size with mild concentric hypertrophy and normal systolic function.  · The estimated ejection fraction is 60%.  · Mild right ventricular enlargement with low normal right ventricular systolic function.  · Mild mitral regurgitation.  · Mild tricuspid regurgitation.  · The estimated PA systolic pressure is 22 mmHg.  · Atrial fibrillation observed.      EKG 3/23/22:  Atrial fibrillation with premature ventricular or aberrantly conducted   complexes   Rightward axis   Cannot rule out Anterior infarct ,age undetermined   Abnormal ECG   When compared with ECG of 22-MAR-2022 13:53,   Minimal criteria for Anterior infarct are now Present   Confirmed by Lane Cuellar MD ([id number]) on 3/24/2022 3:39:09 PM  Functional Capacity good / => 4 METS    Pulmonary:  Pulmonary Normal    Renal/:   Chronic Renal Disease, CRI    Hepatic/GI:   GERD    Musculoskeletal:   Arthritis   Nontraumatic incomplete tear of left rotator cuff (   Neurological:   Neuromuscular Disease,    Endocrine:   Diabetes, type 2  Obesity / BMI > 30  Dermatological:  Skin Normal    Psych:  Psychiatric Normal           Physical Exam  General: Cooperative, Alert and Oriented    Airway:  Mallampati: II   Mouth Opening: Normal  TM Distance: > 6 cm  Tongue: Normal    Dental:  Intact        Anesthesia Plan  Type of Anesthesia, risks & benefits discussed:    Anesthesia Type: Gen ETT  Intra-op Monitoring Plan: Standard ASA Monitors  Post Op Pain Control Plan: multimodal  "analgesia and peripheral nerve block  Induction:  IV  Airway Plan: Video, Post-Induction  Informed Consent: Informed consent signed with the Patient and all parties understand the risks and agree with anesthesia plan.  All questions answered. Patient consented to blood products? Yes  ASA Score: 3  Day of Surgery Review of History & Physical: H&P Update referred to the surgeon/provider.  Anesthesia Plan Notes: PMHx significant for CABG x3, HF, PAD, AF  Seen by Dr. Purdy on 6/13/22- "The patient is at low cardiac risk for the planned orthopedic surgical procedure and associated anesthesia.  No further preoperative cardiac testing is required.  If needed, it is acceptable hold the Eliquis for 3 days prior to surgery and resume it as soon as possible thereafter.  If needed, it is acceptable to hold the Plavix for 5 days prior to surgery and resume it as soon as possible thereafter." Anticoag held appropriately.     Ready For Surgery From Anesthesia Perspective.     .      "

## 2022-06-28 NOTE — DISCHARGE INSTRUCTIONS
Before 7 AM, enter through the Emergency Entrance..   After 7 AM enter through the Main Entrance.      Your procedure  is scheduled for __7/5/2022________.    Call [phone] between 2pm and 5pm on __7/1/2022____to find out your arrival time for the day of surgery.    You may use the main entrance to the hospital on the [facility] side, or the entrance that is next to the [facility].    You may have two visitors.  Visiting hours for non-COVID-19 patients expanded to 24/7 (still restricted to one visitor)  Youth visitation changed from age 18 to age 12.      You will be going to the Same Day Surgery Unit on the 2nd floor of the hospital.    Important instructions:  Do not eat anything after midnight.  You may have plain water, non carbonated.  You may also have Gatorade or Powerade after midnight.    Stop all fluids 2 hours before your surgery.    It is okay to brush your teeth.  Do not have gum, candy or mints.    SEE MEDICATION SHEET.   TAKE MEDICATIONS AS DIRECTED WITH SIPS OF WATER.      Do not take any diabetic medication on the morning of surgery unless instructed to do so by your doctor or pre op nurse.    STOP taking Aspirin, Ibuprofen,  Advil, Motrin, Mobic(meloxicam), Aleve (naproxen), Fish oil, and Vitamin E for at least 7 days before your surgery.     You may take Tylenol if needed which is not a blood thinner.    Please shower the night before and the morning of your surgery.      Use Hibiclens soap as instructed by your pre op nurse.   Please place clean linens on your bed the night before surgery. Please wear fresh clean clothing after each shower.    No shaving of procedural area at least 4-5 days before surgery due to increased risk of skin irritation and/or possible infection.    Contact lenses and removable denture work may not be worn during your procedure.    You may wear deodorant only. If you are having breast surgery, do not wear deodorant on the operative side.    Do not wear powder,  body lotion, perfume/cologne or make-up.    Do not wear any jewelry or have any metal on your body.    You will be asked to remove any dentures or partials for the procedure.    If you are going home on the same day of surgery, you must arrange for a family member or a friend to drive you home.  Public transportation is prohibited.  You will not be able to drive home if you were given anesthesia or sedation.    Patients who want to have their Post-op prescriptions filled from our in-house [facility], bring a Credit/Debit Card  or cash with you. A co-pay may be required.  The pharmacy closes at 5:30 pm.    Wear loose fitting clothes allowing for bandages.    Please leave money and valuables home.      You may bring your cell phone.    Call the doctor if fever or illness should occur before your surgery.    Call [phone] to contact us here if needed.

## 2022-07-05 ENCOUNTER — HOSPITAL ENCOUNTER (OUTPATIENT)
Facility: HOSPITAL | Age: 72
Discharge: HOME | End: 2022-07-05
Attending: ORTHOPAEDIC SURGERY | Admitting: ORTHOPAEDIC SURGERY
Payer: MEDICARE

## 2022-07-05 ENCOUNTER — ANESTHESIA (OUTPATIENT)
Dept: SURGERY | Facility: HOSPITAL | Age: 72
End: 2022-07-05
Payer: MEDICARE

## 2022-07-05 VITALS
SYSTOLIC BLOOD PRESSURE: 170 MMHG | TEMPERATURE: 98 F | OXYGEN SATURATION: 97 % | RESPIRATION RATE: 18 BRPM | DIASTOLIC BLOOD PRESSURE: 61 MMHG | WEIGHT: 189.38 LBS | HEART RATE: 53 BPM | BODY MASS INDEX: 29.66 KG/M2

## 2022-07-05 DIAGNOSIS — M75.112 NONTRAUMATIC INCOMPLETE TEAR OF LEFT ROTATOR CUFF: Primary | ICD-10-CM

## 2022-07-05 DIAGNOSIS — E11.65 TYPE 2 DIABETES MELLITUS WITH HYPERGLYCEMIA, WITH LONG-TERM CURRENT USE OF INSULIN: ICD-10-CM

## 2022-07-05 DIAGNOSIS — Z79.4 TYPE 2 DIABETES MELLITUS WITH HYPERGLYCEMIA, WITH LONG-TERM CURRENT USE OF INSULIN: ICD-10-CM

## 2022-07-05 LAB — POCT GLUCOSE: 91 MG/DL (ref 70–110)

## 2022-07-05 PROCEDURE — 29827 PR SHLDR ARTHROSCOP,SURG,W/ROTAT CUFF REPR: ICD-10-PCS | Mod: RT,,, | Performed by: ORTHOPAEDIC SURGERY

## 2022-07-05 PROCEDURE — D9220A PRA ANESTHESIA: ICD-10-PCS | Mod: CRNA,,, | Performed by: STUDENT IN AN ORGANIZED HEALTH CARE EDUCATION/TRAINING PROGRAM

## 2022-07-05 PROCEDURE — 29828 SHO ARTHRS SRG BICP TENODSIS: CPT | Mod: 51,RT,, | Performed by: ORTHOPAEDIC SURGERY

## 2022-07-05 PROCEDURE — 25000003 PHARM REV CODE 250: Performed by: ORTHOPAEDIC SURGERY

## 2022-07-05 PROCEDURE — C1889 IMPLANT/INSERT DEVICE, NOC: HCPCS | Performed by: ORTHOPAEDIC SURGERY

## 2022-07-05 PROCEDURE — 29827 SHO ARTHRS SRG RT8TR CUF RPR: CPT | Mod: RT,,, | Performed by: ORTHOPAEDIC SURGERY

## 2022-07-05 PROCEDURE — 36000711: Performed by: ORTHOPAEDIC SURGERY

## 2022-07-05 PROCEDURE — 63600175 PHARM REV CODE 636 W HCPCS: Performed by: STUDENT IN AN ORGANIZED HEALTH CARE EDUCATION/TRAINING PROGRAM

## 2022-07-05 PROCEDURE — 36000710: Performed by: ORTHOPAEDIC SURGERY

## 2022-07-05 PROCEDURE — 76942 ECHO GUIDE FOR BIOPSY: CPT | Mod: 26,,, | Performed by: ANESTHESIOLOGY

## 2022-07-05 PROCEDURE — 71000015 HC POSTOP RECOV 1ST HR: Performed by: ORTHOPAEDIC SURGERY

## 2022-07-05 PROCEDURE — 27201423 OPTIME MED/SURG SUP & DEVICES STERILE SUPPLY: Performed by: ORTHOPAEDIC SURGERY

## 2022-07-05 PROCEDURE — 71000033 HC RECOVERY, INTIAL HOUR: Performed by: ORTHOPAEDIC SURGERY

## 2022-07-05 PROCEDURE — 63600175 PHARM REV CODE 636 W HCPCS: Performed by: ORTHOPAEDIC SURGERY

## 2022-07-05 PROCEDURE — 82962 GLUCOSE BLOOD TEST: CPT | Performed by: ORTHOPAEDIC SURGERY

## 2022-07-05 PROCEDURE — 64415 NJX AA&/STRD BRCH PLXS IMG: CPT | Mod: 59,RT,, | Performed by: ANESTHESIOLOGY

## 2022-07-05 PROCEDURE — 76942 ECHO GUIDE FOR BIOPSY: CPT | Performed by: ANESTHESIOLOGY

## 2022-07-05 PROCEDURE — 29828 PR ARTHROSCOPY SHOULDER SURGICAL BICEPS TENODESIS: ICD-10-PCS | Mod: 51,RT,, | Performed by: ORTHOPAEDIC SURGERY

## 2022-07-05 PROCEDURE — 71000016 HC POSTOP RECOV ADDL HR: Performed by: ORTHOPAEDIC SURGERY

## 2022-07-05 PROCEDURE — 63600175 PHARM REV CODE 636 W HCPCS: Performed by: ANESTHESIOLOGY

## 2022-07-05 PROCEDURE — 25000003 PHARM REV CODE 250: Performed by: STUDENT IN AN ORGANIZED HEALTH CARE EDUCATION/TRAINING PROGRAM

## 2022-07-05 PROCEDURE — 37000008 HC ANESTHESIA 1ST 15 MINUTES: Performed by: ORTHOPAEDIC SURGERY

## 2022-07-05 PROCEDURE — C1713 ANCHOR/SCREW BN/BN,TIS/BN: HCPCS | Performed by: ORTHOPAEDIC SURGERY

## 2022-07-05 PROCEDURE — D9220A PRA ANESTHESIA: Mod: ANES,,, | Performed by: ANESTHESIOLOGY

## 2022-07-05 PROCEDURE — 76942 PR U/S GUIDANCE FOR NEEDLE GUIDANCE: ICD-10-PCS | Mod: 26,,, | Performed by: ANESTHESIOLOGY

## 2022-07-05 PROCEDURE — D9220A PRA ANESTHESIA: Mod: CRNA,,, | Performed by: STUDENT IN AN ORGANIZED HEALTH CARE EDUCATION/TRAINING PROGRAM

## 2022-07-05 PROCEDURE — 37000009 HC ANESTHESIA EA ADD 15 MINS: Performed by: ORTHOPAEDIC SURGERY

## 2022-07-05 PROCEDURE — 64415 PR NERVE BLOCK INJ, ANES/STEROID, BRACHIAL PLEXUS, INCL IMAG GUIDANCE: ICD-10-PCS | Mod: 59,RT,, | Performed by: ANESTHESIOLOGY

## 2022-07-05 PROCEDURE — C1769 GUIDE WIRE: HCPCS | Performed by: ORTHOPAEDIC SURGERY

## 2022-07-05 PROCEDURE — D9220A PRA ANESTHESIA: ICD-10-PCS | Mod: ANES,,, | Performed by: ANESTHESIOLOGY

## 2022-07-05 DEVICE — ANCHOR BONE ARTHSCP DEL SYS: Type: IMPLANTABLE DEVICE | Site: SHOULDER | Status: FUNCTIONAL

## 2022-07-05 DEVICE — ANCHOR TENDON 8: Type: IMPLANTABLE DEVICE | Site: SHOULDER | Status: FUNCTIONAL

## 2022-07-05 DEVICE — IMPLANT ARTHSCP BIOINDUCTV MED: Type: IMPLANTABLE DEVICE | Site: SHOULDER | Status: FUNCTIONAL

## 2022-07-05 RX ORDER — FENTANYL CITRATE 50 UG/ML
25 INJECTION, SOLUTION INTRAMUSCULAR; INTRAVENOUS EVERY 5 MIN PRN
Status: DISCONTINUED | OUTPATIENT
Start: 2022-07-05 | End: 2022-07-05 | Stop reason: HOSPADM

## 2022-07-05 RX ORDER — SODIUM CHLORIDE 0.9 % (FLUSH) 0.9 %
10 SYRINGE (ML) INJECTION
Status: DISCONTINUED | OUTPATIENT
Start: 2022-07-05 | End: 2022-07-05 | Stop reason: HOSPADM

## 2022-07-05 RX ORDER — LIDOCAINE HYDROCHLORIDE 20 MG/ML
INJECTION INTRAVENOUS
Status: DISCONTINUED | OUTPATIENT
Start: 2022-07-05 | End: 2022-07-05

## 2022-07-05 RX ORDER — PROPOFOL 10 MG/ML
VIAL (ML) INTRAVENOUS
Status: DISCONTINUED | OUTPATIENT
Start: 2022-07-05 | End: 2022-07-05

## 2022-07-05 RX ORDER — OXYCODONE HYDROCHLORIDE 5 MG/1
5 TABLET ORAL EVERY 6 HOURS PRN
Qty: 20 TABLET | Refills: 0 | Status: SHIPPED | OUTPATIENT
Start: 2022-07-05 | End: 2022-07-22 | Stop reason: SDUPTHER

## 2022-07-05 RX ORDER — ROPIVACAINE HYDROCHLORIDE 5 MG/ML
INJECTION, SOLUTION EPIDURAL; INFILTRATION; PERINEURAL
Status: DISCONTINUED | OUTPATIENT
Start: 2022-07-05 | End: 2022-07-05

## 2022-07-05 RX ORDER — ONDANSETRON 4 MG/1
4 TABLET, ORALLY DISINTEGRATING ORAL EVERY 6 HOURS PRN
Qty: 10 TABLET | Refills: 0 | Status: SHIPPED | OUTPATIENT
Start: 2022-07-05 | End: 2023-09-20

## 2022-07-05 RX ORDER — ONDANSETRON 2 MG/ML
INJECTION INTRAMUSCULAR; INTRAVENOUS
Status: DISCONTINUED | OUTPATIENT
Start: 2022-07-05 | End: 2022-07-05

## 2022-07-05 RX ORDER — MIDAZOLAM HYDROCHLORIDE 1 MG/ML
INJECTION, SOLUTION INTRAMUSCULAR; INTRAVENOUS
Status: DISCONTINUED | OUTPATIENT
Start: 2022-07-05 | End: 2022-07-05

## 2022-07-05 RX ORDER — EPINEPHRINE 0.1 MG/ML
INJECTION INTRAVENOUS
Status: DISCONTINUED | OUTPATIENT
Start: 2022-07-05 | End: 2022-07-05 | Stop reason: HOSPADM

## 2022-07-05 RX ORDER — ROCURONIUM BROMIDE 10 MG/ML
INJECTION, SOLUTION INTRAVENOUS
Status: DISCONTINUED | OUTPATIENT
Start: 2022-07-05 | End: 2022-07-05

## 2022-07-05 RX ORDER — ACETAMINOPHEN 500 MG
500 TABLET ORAL EVERY 6 HOURS
Qty: 28 TABLET | Refills: 0 | Status: SHIPPED | OUTPATIENT
Start: 2022-07-05 | End: 2022-07-12

## 2022-07-05 RX ORDER — HYDRALAZINE HYDROCHLORIDE 20 MG/ML
10 INJECTION INTRAMUSCULAR; INTRAVENOUS ONCE
Status: COMPLETED | OUTPATIENT
Start: 2022-07-05 | End: 2022-07-05

## 2022-07-05 RX ORDER — DEXAMETHASONE SODIUM PHOSPHATE 4 MG/ML
INJECTION, SOLUTION INTRA-ARTICULAR; INTRALESIONAL; INTRAMUSCULAR; INTRAVENOUS; SOFT TISSUE
Status: DISCONTINUED | OUTPATIENT
Start: 2022-07-05 | End: 2022-07-05

## 2022-07-05 RX ORDER — OXYCODONE HYDROCHLORIDE 5 MG/1
5 TABLET ORAL ONCE
Status: COMPLETED | OUTPATIENT
Start: 2022-07-05 | End: 2022-07-05

## 2022-07-05 RX ORDER — HALOPERIDOL 5 MG/ML
0.5 INJECTION INTRAMUSCULAR EVERY 10 MIN PRN
Status: DISCONTINUED | OUTPATIENT
Start: 2022-07-05 | End: 2022-07-05 | Stop reason: HOSPADM

## 2022-07-05 RX ORDER — FENTANYL CITRATE 50 UG/ML
INJECTION, SOLUTION INTRAMUSCULAR; INTRAVENOUS
Status: DISCONTINUED | OUTPATIENT
Start: 2022-07-05 | End: 2022-07-05

## 2022-07-05 RX ORDER — EPHEDRINE SULFATE 50 MG/ML
INJECTION, SOLUTION INTRAVENOUS
Status: DISCONTINUED | OUTPATIENT
Start: 2022-07-05 | End: 2022-07-05

## 2022-07-05 RX ORDER — CEFAZOLIN SODIUM 1 G/50ML
2 SOLUTION INTRAVENOUS ONCE
Status: COMPLETED | OUTPATIENT
Start: 2022-07-05 | End: 2022-07-05

## 2022-07-05 RX ADMIN — LIDOCAINE HYDROCHLORIDE 100 MG: 20 INJECTION, SOLUTION INTRAVENOUS at 07:07

## 2022-07-05 RX ADMIN — PHENYLEPHRINE HYDROCHLORIDE 30 MCG/MIN: 10 INJECTION INTRAVENOUS at 07:07

## 2022-07-05 RX ADMIN — EPHEDRINE SULFATE 5 MG: 50 INJECTION INTRAVENOUS at 09:07

## 2022-07-05 RX ADMIN — PROPOFOL 150 MG: 10 INJECTION, EMULSION INTRAVENOUS at 07:07

## 2022-07-05 RX ADMIN — EPHEDRINE SULFATE 10 MG: 50 INJECTION INTRAVENOUS at 08:07

## 2022-07-05 RX ADMIN — MIDAZOLAM HYDROCHLORIDE 1 MG: 1 INJECTION, SOLUTION INTRAMUSCULAR; INTRAVENOUS at 07:07

## 2022-07-05 RX ADMIN — SODIUM CHLORIDE, SODIUM LACTATE, POTASSIUM CHLORIDE, AND CALCIUM CHLORIDE: .6; .31; .03; .02 INJECTION, SOLUTION INTRAVENOUS at 07:07

## 2022-07-05 RX ADMIN — SUGAMMADEX 200 MG: 100 INJECTION, SOLUTION INTRAVENOUS at 10:07

## 2022-07-05 RX ADMIN — GLYCOPYRROLATE 0.2 MG: 0.2 INJECTION, SOLUTION INTRAMUSCULAR; INTRAVITREAL at 08:07

## 2022-07-05 RX ADMIN — FENTANYL CITRATE 25 MCG: 50 INJECTION, SOLUTION INTRAMUSCULAR; INTRAVENOUS at 10:07

## 2022-07-05 RX ADMIN — OXYCODONE 5 MG: 5 TABLET ORAL at 03:07

## 2022-07-05 RX ADMIN — ONDANSETRON 4 MG: 2 INJECTION, SOLUTION INTRAMUSCULAR; INTRAVENOUS at 09:07

## 2022-07-05 RX ADMIN — HYDRALAZINE HYDROCHLORIDE 10 MG: 20 INJECTION, SOLUTION INTRAMUSCULAR; INTRAVENOUS at 02:07

## 2022-07-05 RX ADMIN — CEFAZOLIN SODIUM 2 G: 1 SOLUTION INTRAVENOUS at 07:07

## 2022-07-05 RX ADMIN — SODIUM CHLORIDE, SODIUM LACTATE, POTASSIUM CHLORIDE, AND CALCIUM CHLORIDE: .6; .31; .03; .02 INJECTION, SOLUTION INTRAVENOUS at 09:07

## 2022-07-05 RX ADMIN — FENTANYL CITRATE 50 MCG: 50 INJECTION, SOLUTION INTRAMUSCULAR; INTRAVENOUS at 07:07

## 2022-07-05 RX ADMIN — ROPIVACAINE HYDROCHLORIDE 20 ML: 5 INJECTION, SOLUTION EPIDURAL; INFILTRATION; PERINEURAL at 07:07

## 2022-07-05 RX ADMIN — DEXAMETHASONE SODIUM PHOSPHATE 4 MG: 4 INJECTION, SOLUTION INTRAMUSCULAR; INTRAVENOUS at 08:07

## 2022-07-05 RX ADMIN — ROCURONIUM BROMIDE 50 MG: 10 INJECTION, SOLUTION INTRAVENOUS at 07:07

## 2022-07-05 NOTE — DISCHARGE INSTRUCTIONS
Rotator Cuff Tear Post-Operative Instructions      Valerie Olivera MD  Clinic phone number: [phone]     Call your surgeon for:   Uncontrolled nausea or vomiting  Persistent numbness or tingling in the arm after the block has worn off   Fevers greater than 101.4  Redness surrounding the incision (swelling is normal)  Shortness of breath/difficulty breathing   Chest pain  Any other concerns you may have   Pain:  You will be sent home from the hospital with a pain medication e-prescribed to your pharmacy   The anesthesiologist will perform a block which will numb your arm for several hours after the surgery. When you start to feel sensation return (in the form of pain or tingling) start taking your pain medication. The medicine takes about an hour to work do you do not want to wait until the block has fully worn off.   The first two days will be the most painful. After the pain starts to lessen you should start weaning the narcotic medication  An anti-nausea medicine will be sent with the pain medication as many people do experience nausea as a side effect  An over the counter stool softener such as Miralax can be taken to avoid constipation as a side effect of the pain medication   The pain medication is intended to make your pain more tolerable. It is not intended to relieve 100% of pain.  Do not drive while taking pain medications   Avoid taking other sedative medications such as sleeping pills while taking narcotics.   Ice  You will be sent home with a polar care wrap as part of your dressings. It will be hooked up to a polar care ice pump.   For the first 4 days, use the machine for 30 minutes on and then 30 minutes off. Turning the unit off is especially important when your arm is numb from the block to avoid thermal injury to the skin.  After day 5 use as needed for pain control.   To fill the unit:   Unlock handles and remove lid   Fill with cold water to the indicated line and then with ice  Replace lid  and lock   Keep unit upright   To use the polar care:  Before turning on make sure the pad is connected  Plug in  -- this will turn the unit on  Refilling:  Unplug unit to turn off  Disconnect the wrap by pushing down on the metal tab and gently pulling the connectors apart  Drain unit and refill  Reattach connector by pushing down on metal piece and pushing the connectors together   After the dressing is removed, keep the blue towel between the skin and the cooling pad so the pad does not directly contact your skin.    You will be sent home with instructions of how the pad should be re-applied after your dressing change.  Dressing care:    Leave your dressing on for three days. You may remove it and then recover each incision with a large band-aid   It is normal for the dressing to have a large amount of fluid on it.  This will be mixed with some blood.  Fluid is used during the surgery and will slowly leak out of the wounds over the first few days.  Do not get the dressing or incisions wet.  You will need to carefully shower or take a sponge bath to avoid wetting the dressing.  To wash under your arm safely, bend forward slightly to allow the arm to gently move forward so you can clean under your arm without raising it to the side   Sling Instructions:  Wear sling at all times until instructed otherwise. It will be worn a total of 4-6 weeks depending on the size of your rotator cuff tear  Keep the pillow between the sling and your body in place as well  The sling may be removed for hygiene and dressing.    You may take the sling off to perform elbow range of motion exercises.  If a biceps tenodesis has been performed this should be avoided the first two weeks.   Post op activity and precautions:  Keep sling on as described above   Do not actively move the arm at the shoulder until instructed to do so by your surgeon.   No lifting   When lying on your back, put a pillow or towel behind the elbow to support the arm    No pushing yourself up out of a chair or off of your bed with the operative arm  You may find it more comfortable to sleep in about 30-40 degrees of inclination - in a recliner or on multiple pillows  Do not lay on the same side of the operative arm   To wash under your arm safely, bend forward slightly to allow the arm to gently move forward so you can clean under your arm without raising it to the side  Avoid any activities or places that may result in a fall (walking on uneven ground, crowded places, etc)  Physical Therapy  Physical therapy will start at 2-6 weeks post op depending on the size of your tear   In general, strengthening is not started until 12 weeks and after motion has been restored   Follow up appointment  You will be seen in clinic in 2 weeks following your procedure.  This will be made before you leave the hospital   Sutures will be removed  More detailed activity instructions will be given at that time       ***  ACTIVITY LEVEL: If you have received sedation or an anesthetic, you may feel sleepy for several hours. Rest until you are more awake. Gradually resume your normal activities.              DIET: You may resume your home diet. If nausea is present, increase your diet gradually with fluids and bland foods.    Medications: Pain medication should be taken only if needed and as directed. If antibiotics are prescribed, the medication should be taken until completed. You will be given an updated list of you medications.    No driving, alcoholic beverages or signing legal documents for next 24 hours or while taking pain medication.  Elevate the affected extremity to a level above your heart and ice packs may be applied in intervals of 15-20 minutes on 15-20 minutes off while awake    CALL THE DOCTOR:   For any obvious bleeding (some dried blood over the incision is normal).     Redness, swelling, foul smell around incision or fever over 101.  Shortness of breath, Coughing up Bloody Sputum or  Pains or Swelling in your Calves.  Persistent pain or nausea not relieved by medication.  Impaired circulation such as tingling, change in color, numbness or tingling, coldness.    If any unusual problems or difficulties occur contact your doctor. If you cannot contact your doctor but feel your signs and symptoms warrant a physicians attention return to the emergency room.         Fall Prevention  Millions of people fall every year and injure themselves. You may have had anesthesia or sedation which may increase your risk of falling. You may have health issues that put you at an increased risk of falling.     Here are ways to reduce your risk of falling.    Make your home safe by keeping walkways clear of objects you may trip over.  Use non-slip pads under rugs. Do not use area rugs or small throw rugs.  Use non-slip mats in bathtubs and showers.  Install handrails and lights on staircases.  Do not walk in poorly lit areas.  Do not stand on chairs or wobbly ladders.  Use caution when reaching overhead or looking upward. This position can cause a loss of balance.  Be sure your shoes fit properly, have non-slip bottoms and are in good condition.   Wear shoes both inside and out. Avoid going barefoot or wearing slippers.  Be cautious when going up and down stairs, curbs, and when walking on uneven sidewalks.  If your balance is poor, consider using a cane or walker.  If your fall was related to alcohol use, stop or limit alcohol intake.   If your fall was related to use of sleeping medicines, talk to your doctor about this. You may need to reduce your dosage at bedtime if you awaken during the night to go to the bathroom.    To reduce the need for nighttime bathroom trips:  Avoid drinking fluids for several hours before going to bed  Empty your bladder before going to bed  Men can keep a urinal at the bedside  Stay as active as you can. Balance, flexibility, strength, and endurance all come from exercise. They all  play a role in preventing falls. Ask your healthcare provider which types of activity are right for you.  Get your vision checked on a regular basis.  If you have pets, know where they are before you stand up or walk so you don't trip over them.  Use night lights.

## 2022-07-05 NOTE — ANESTHESIA POSTPROCEDURE EVALUATION
Anesthesia Post Evaluation    Patient: Driss Hernandez     Procedure(s) Performed: Procedure(s) (LRB):  REPAIR, ROTATOR CUFF, ARTHROSCOPIC (Right)    Final Anesthesia Type: general      Patient location during evaluation: PACU  Patient participation: Yes- Able to Participate  Level of consciousness: awake and alert  Post-procedure vital signs: reviewed and stable  Pain management: adequate  Airway patency: patent  GENNARO mitigation strategies: Multimodal analgesia and Extubation while patient is awake  PONV status at discharge: No PONV  Anesthetic complications: no      Cardiovascular status: blood pressure returned to baseline  Respiratory status: unassisted and spontaneous ventilation  Hydration status: euvolemic  Follow-up not needed.          Vitals Value Taken Time   /75 07/05/22 1131   Temp 36.4 °C (97.5 °F) 07/05/22 1131   Pulse 61 07/05/22 1131   Resp 16 07/05/22 1131   SpO2 94 % 07/05/22 1131         Event Time   Out of Recovery 11:30:00         Pain/Jose Maria Score: Jose Maria Score: 10 (7/5/2022 11:31 AM)

## 2022-07-05 NOTE — ANESTHESIA PROCEDURE NOTES
Intubation    Date/Time: 7/5/2022 7:40 AM  Performed by: Chaya Lovell CRNA  Authorized by: Yamini Nails MD     Intubation:     Induction:  Intravenous    Intubated:  Postinduction    Mask Ventilation:  Easy mask    Attempts:  1    Attempted By:  CRNA    Method of Intubation:  Video laryngoscopy    Blade:  Weinsetin 3    Laryngeal View Grade: Grade I - full view of cords      Difficult Airway Encountered?: No      Complications:  None    Airway Device:  Oral endotracheal tube    Airway Device Size:  7.0    Style/Cuff Inflation:  Cuffed (inflated to minimal occlusive pressure)    Tube secured:  23    Secured at:  The lips    Placement Verified By:  Capnometry    Complicating Factors:  None    Findings Post-Intubation:  BS equal bilateral and atraumatic/condition of teeth unchanged

## 2022-07-05 NOTE — OR NURSING
Connected to cardiac monitor. Dr. Nails at bedside for time out prior to supraclavicular block. Patient tolerated well. Patient bradycardic, HR 45-52, Dr. Nails aware, otherwise VSS

## 2022-07-05 NOTE — ANESTHESIA PROCEDURE NOTES
Peripheral Block    Patient location during procedure: pre-op   Block not for primary anesthetic.  Reason for block: at surgeon's request and post-op pain management   Post-op Pain Location: Right   Start time: 7/5/2022 7:06 AM  Timeout: 7/5/2022 7:06 AM   End time: 7/5/2022 7:11 AM    Staffing  Authorizing Provider: Yamini Nails MD  Performing Provider: Yamini Nails MD    Preanesthetic Checklist  Completed: patient identified, IV checked, site marked, risks and benefits discussed, surgical consent, monitors and equipment checked, pre-op evaluation and timeout performed  Peripheral Block  Patient position: sitting  Prep: ChloraPrep and site prepped and draped  Patient monitoring: heart rate, cardiac monitor, continuous pulse ox, continuous capnometry and frequent blood pressure checks  Block type: interscalene  Laterality: right  Injection technique: single shot  Needle  Needle type: Tuohy   Needle gauge: 18 G  Needle length: 2 in  Needle localization: anatomical landmarks and ultrasound guidance  Catheter type: non-stimulating  Catheter size: 20 G  Test dose: lidocaine 1.5% with Epi 1-to-200,000 and negative   -ultrasound image captured on disc.  Assessment  Injection assessment: negative aspiration, negative parasthesia and local visualized surrounding nerve  Paresthesia pain: none  Heart rate change: no  Slow fractionated injection: yes  Pain Tolerance: comfortable throughout block and no complaints      Additional Notes  VSS.  [facility] RN monitoring vitals throughout procedure.  Patient tolerated procedure well.

## 2022-07-05 NOTE — TRANSFER OF CARE
Anesthesia Transfer of Care Note    Patient: Driss Hernandez JrCherise    Procedure(s) Performed: Procedure(s) (LRB):  REPAIR, ROTATOR CUFF, ARTHROSCOPIC (Right)    Patient location: PACU    Anesthesia Type: general    Transport from OR: Transported from OR on 6-10 L/min O2 by face mask with adequate spontaneous ventilation    Post pain: adequate analgesia    Post assessment: no apparent anesthetic complications and tolerated procedure well    Post vital signs: stable    Level of consciousness: sedated and responds to stimulation    Nausea/Vomiting: no nausea/vomiting    Complications: none    Transfer of care protocol was followed      Last vitals:   Visit Vitals  /61   Pulse 60   Temp 36.3 °C (97.4 °F) (Temporal)   Resp 20   Wt 85.9 kg (189 lb 6 oz)   SpO2 100%   BMI 29.66 kg/m²

## 2022-07-05 NOTE — OP NOTE
Surgeon: Valerie Olivera MD     PATIENT INFORMATION   Driss Hernandez Jr. 71 y.o. male 1950  MRN: [id number]  LOCATION: [facility]     DATE OF PROCEDURE: 5/5/2021    PREOPERATIVE DIAGNOSES:     1. Right rotator cuff tear   2. Right biceps tendinopathy  3. Right shoulder impingement, bursitis    POSTOPERATIVE DIAGNOSES:   Right   1. Rotator cuff tear, partial thickness tear involving the  supraspinatus  2. Biceps tendinopathy  3. Shoulder synovitis  4. Subacromial bursitis     OPERATION:   Right   1. Shoulder arthroscopic  supraspinatus rotator cuff repair  2. Arthroscopic biceps tenodesis   3. Arthroscopic subacromial decompression, bursectomy       Surgeon(s) and Role:     * Valerie Olivera MD - Primary     ANESTHESIA: Regional with General Anesthesia     ESTIMATED BLOOD LOSS: less than 50 mL     IMPLANTS:   Implant Name Type Inv. Item Serial No.  Lot No. LRB No. Used Action   ANCHOR TENDON 8 - [id number]  ANCHOR TENDON 8  [employer] [id number] Right 1 Implanted   IMPLANT ARTHSCP BIOINDUCTV MED - [id number]  IMPLANT ARTHSCP BIOINDUCTV MED  [facility] [employer] [id number] Right 1 Implanted   ANCHOR BONE ARTHSCP DEL SYS - [id number]  ANCHOR BONE ARTHSCP DEL SYS  [employer] [id number] Right 1 Implanted           FINDINGS:   * Thiickened and fibrotic and hemorrhagic bursa, subacromial space.  * Rotator cuff tear - full thickness  * Biceps tendinopathy     SPECIMENS:       Specimen (12h ago, onward)     None          COMPLICATIONS: None.      INTRAOPERATIVE COUNTS: Correct.      PROPHYLACTIC IV ANTIBIOTICS: Given per [facility] Protocol.     INDICATIONS FOR OPERATION:  Driss Hernandez Jr. 71 y.o. male has been seen and evaluated in the office for continued shoulder pain and mechanical symptoms not improved with extensive nonoperative treatment.   After a lengthy discussion with the patient, they wished to proceed with surgical intervention. The patient was fully informed of risks and  benefits.     DESCRIPTION OF PROCEDURE:  After informed consent was obtained, patient was taken to the operating room and placed in the supine fashion.  Regioal anesthesia was administered by anesthesia personnel in the pre operative holding area during the procedure.  The patient was brought to the operating room and underwent general anesthesia as well due to anesthesia concerns regarding the patients habitus and maintenance of the airway.  The patient was positioned in the beach chair position all all bony prominences were padded. The right upper extremity was prepped and draped in the normal sterile fashion. A time-out was called confirming the correct patient, site, side, procedure, and that appropriate antibiotics had been administered within 30 min of incision. Diagnostic arthroscopy was performed through the standard posterior portal.  Systematic review of the joint demonstrated intact subscapularis, partial articular sided tearing of the supraspinatus, small area of grade II chondromalacia of the glenoid, no chondromalacia of the humeral head, biceps tendinopathy, synovitis, degenerative labral fraying and small inferior osteophyte.    the biceps was tagged through thr rotator interval using fiberwire passed with a spinal needle and PDS suture. The joint was then extensively debrided.      The scope was then introduced into the subacromial space. A bursectomy was performed using a combination of the radiofrequency device and shaver.  No acriomioplasty indicated.  The biceps sutures were retrieved from the subscromial space and tied through the anterior portal.  The probe was used to size the affected area to assist in she using the patch size.  The guidewire was inserted into the lateral aspect of the supraspinatus footprint.  The implant  was inserted over this wire and deployed.  Accessory portals were localized using a spinal needle and a stapler was passed through these portals to affix the  implant to the rotator cuff.  Once the medial, anterior, and posterior borders were appropriately fixed to the rotator cuff, the  was removed and a lateral staple was applied to the lateral aspect of the implant.  It was noted to have good coverage of the affected area.     The instruments were removed from the shoulder.  Portal sites were closed.        Sterile dressings were applied followed by a polar Care wrap and Super Sling.  The patient was awakened from anesthesia and brought to recovery room without apparent complication     POSTOPERATIVE PLAN: [facility] + biceps tenodesis. AROM of elbow ok, no resistive elbow exercises until 8 weeks

## 2022-07-05 NOTE — BRIEF OP NOTE
[facility] [place]rief [place]ative [place]    Surgery Date: 7/5/2022     Surgeon(s) and Role:     * Valerie Olivera MD - Primary    Assisting Surgeon: None    Pre-op Diagnosis:  Nontraumatic incomplete tear of left rotator cuff [M75.112]    Post-op Diagnosis:  Post-Op Diagnosis Codes:     * Nontraumatic incomplete tear of left rotator cuff [M75.112]    Procedure(s) (LRB):  REPAIR, ROTATOR CUFF, ARTHROSCOPIC (Right)    Anesthesia: Regional    Operative Findings: subacromial bursitis, biceps tendonitis, partial rotator cuff tear    Estimated Blood Loss: <5 cc         Specimens: none    Discharge Note    OUTCOME: Patient tolerated treatment/procedure well without complication and is now ready for discharge.    DISPOSITION: Home or Self Care    FINAL DIAGNOSIS:  Nontraumatic incomplete tear of left rotator cuff    FOLLOWUP: In clinic    DISCHARGE INSTRUCTIONS:    Discharge Procedure Orders   Ice to affected area   Order Comments: Polar care 30 min on 30 min off     Other restrictions (specify):   Order Comments: Remain in sling until follow up visit     Lifting restrictions   Order Comments: No lifting RUE     Leave dressing on - Keep it clean, dry, and intact until clinic visit

## 2022-07-05 NOTE — ADDENDUM NOTE
Addendum  created 07/05/22 1431 by Biju Osorio MD    Order list changed, Pharmacy for encounter modified

## 2022-07-05 NOTE — PATIENT INSTRUCTIONS
Post operative pain control     You have been prescribed multiple medications to help with pain control and minimize use of narcotic pain medications. This is called multimodal pain control.   Take these medications scheduled for the first week (take on the below schedule whether or not you are having pain)  Tylenol 500 mg every 6 hours   Continue celebrex 100 mg once daily    Take oxycodone 5 mg every 6 hours as needed for breakthrough pain (only if pain is not well controlled with the above medications)    If you use a CPAP for sleep apnea it is extremely important to use this as instructed while taking the pain medication. Untreated sleep apnea and narcotic use can cause respiratory arrest and death

## 2022-07-05 NOTE — H&P
Chief Complaint   Patient presents with    Right Shoulder - Pain         HPI (10/5/21): Driss Hernandez Jr. is a 71 y.o. male who presents today complaining of right shoulder pain  Duration of symptoms:  About 6 months  Initially treated for cervical spine issues - no relief, patient believes pain is coming from the shoulder   Trauma or new activity: no  Pain is constant with progressive worsening throughout the day   Aggravating factors: has pain after activity  Does not have pain with reaching overhead, reaching behind his back etc  Has been doing some post melissa construction and he does not find this bothers him too much. Notes the pain more when he is at rest after work   First few months of pain he would feel better if he put his arm up over his head   Relieving factors: rest   Night pain is present and is disruptive to sleep  Radicular symptoms: no numbness, paresthesias   Pain radiates down arm but does not go past the elbow  Pain is localized to anterior shoulder   Prior treatment:  prescription NSAIDs (celebrex) with improvement in pain with doubled dose (done with ok of his PCP)  Pain does interfere with activities of daily living .     10/14/21  Still having pain   Says he lifted a 400 lb cabinet yesterday  He thinks his issues are correlated with coumadin use and multiple sticks for INR levels     1/10/22  Still having pain  Disruptive to sleep   Can sleep some with tizanidine   Not nearly as bad during the day   Only about 4-5 days of relief with the last injection   Therapy has been scheduled inconsistently   Was not compliant with HEP   Still doing a lot of heavylifting   Does not believe therapy will help, we have had this discussion multiple times and I have not be able to convince him otherwise   Reports biceps pain  As well as subdeltoid fossa pain, pain with reaching out in front and overhead      5/16/22  Continued in the symptoms.  The tear to schedule surgery  Injection relieved pain  "temporarily      7/5/22  Here for surgery   No changes since seen in clinic      This is the extent of the patient's complaints at this time.      Hand dominance: Right    Review of Systems   All other systems reviewed and are negative.              Review of patient's allergies indicates:   Allergen Reactions    Penicillins Other (See Comments)       Unknown reaction, Had a reaction as a child    Shrimp Itching       Hand itching            Physical Exam:       Vitals:     05/16/22 0912   BP: 122/60   Pulse: (!) 58   Resp: 18   SpO2: 97%   Weight: 87.2 kg (192 lb 3.9 oz)   Height: 5' 7" (1.702 m)   PainSc:   5   PainLoc: Shoulder         General: Weight: 87.2 kg (192 lb 3.9 oz) Body mass index is 30.11 kg/m².  Patient is alert, awake and oriented to time, place and person. Mood and affect are appropriate.  Patient does not appear to be in any distress, denies any constitutional symptoms and appears stated age.   HEENT: Pupils are equal and round, sclera are not injected. External examination of ears and nose reveals no abnormalities. Cranial nerves II-X are grossly intact  Skin: no rashes, abrasions or open wounds on the affected extremity   Resp: No respiratory distress or audible wheezing   CV: 2+ pulses, all extremities warm and well perfused   Right Shoulder     Shoulder Range of Motion                           Right                                       Left   (Active/Passive)                                        Forward Elevation                                           165/165                                 165/165  External rotation (arm at side)                        45/45                                     45/45       Internal rotation behind the back                    L5                                          L5               Range of motion is painful      Scapular winging no  Scapular dyskinesia yes     Acromioclavicular joint is tender  Crossbody test: negative     Neer's " positive  Hawkin's positive     Donna's positive  Drop arm negative  Belly press negative        Cuff Strength                                                 Right                                       Left   Supraspinatus                                                 4/5                                           5/5  Infraspinatus                                                   5/5                                           5/5  Subscapularis                                                 5/5                                           5/5     Deltoid testing                                                 5/5                                           5/5     Speeds negative  Yergasons negative     Elbow examination demonstrates no tenderness to palpation and has normal range of motion.      ltsi C5-T1  + epl, io, fds, fdp   2+ RP      Imaging: 3 views of the right shoulder:  positive for degenerative changes of the AC joint. The humeral head is well centered on the AP and axillary views.  There is calcification and cystic changes at the rotator cuff insertion on the greater tuberosity. There is not significant degenerative change of the glenohumeral joint or posterior subluxation of the humeral head. No acute changes or fracture.       MRI: partial tear of supra and infra      I personally reviewed and interpreted the patient's imaging obtained today in clinic     Assessment: 71 y.o. male with right rotator cuff tear, failed non op treatment     I explained my diagnostic impression and the reasoning behind it in detail, using layman's terms.  Models and/or pictures were used to help in the explanation.  We discussed non operative and operative treatment modalities      The spectrum of treatment options were discussed with the patient, including nonoperative and operative options.  After thorough discussion, the patient has elected to undergo surgical treatment to include:     right shoulder arthroscopy with  arthroscopic biceps tenodesis, rotator cuff repair, possible Regeneten     We have discussed the surgery and anticipated recovery.  He understands that there may be limited mobility up to several weeks after surgery depending on procedures that are performed at the time of surgery.     The details of the surgical procedure were explained, including the location of probable incisions and a description of likely hardware and/or grafts to be used.  The patient understands the likely convalescence after surgery, in particular the expected postop rehab and recovery course. Alternatives both operative and non-operative with associated risks and benefits discussed. The outlined risks and potential complications of the proposed procedure include but are not limited to: bleeding, infection, vessel and/or nerve damage, pain, numbness, tingling, compartment syndrome, need for additional surgery, failure to return to pre-injury and/or preoperative functional status, inability to return to work, scar sensitivity, delayed healing, complex regional pain syndrome, weakness, partial and/or incomplete relief of symptoms, persistence of and/or worsening of symptoms, hardware and/or surgical failure, prominent and/or symptomatic hardware possibly necessitating future removal, loss of function, stiffness, functional debility, dysfunction, need for prolonged postoperative rehabilitation, deep venous thrombosis, pulmonary embolism, arthritis and death.  The patient states an understanding and wishes to proceed with surgery.   All questions were answered.  No guarantees were implied or stated.  Written informed consent was obtained.     He was also informed and understands the risks of surgery are greater for patients with a current condition or history of heart disease, obesity, clotting disorders, recurrent infections, steroid use, current or past smoking, and factors such as sedentary lifestyle and noncompliance with medications,  "therapy or follow-up. The degree of the increased risk is hard to estimate with any degree of precision.    Does have mild OA of the shoulder - may have some persistent pain due to this      All questions were answered. The patient has verbalized understanding of these issues and wishes to proceed as discussed.   Will proceed with medical clearance.        All questions were answered in detail. The patient is in full agreement with the treatment plan and will proceed accordingly.      This note was created by combination of typed  and M-Modal dictation. Transcription and phonetic errors may be present.  If there are any questions, please contact me.        Current Outpatient Medications:     ACCU-CHEK MOIZ CONTROL SOLN Soln, , Disp: , Rfl:     ACCU-CHEK MOIZ PLUS TEST STRP Strp, TEST THREE TIMES DAILY, Disp: 300 strip, Rfl: 3    ACCU-CHEK SOFTCLIX LANCETS Misc, , Disp: , Rfl:     albuterol (PROVENTIL/VENTOLIN HFA) 90 mcg/actuation inhaler, Inhale 2 puffs into the lungs every 4 (four) hours as needed for Shortness of Breath. Rescue, Disp: 17 g, Rfl: 3    amLODIPine (NORVASC) 5 MG tablet, Take 1 tablet (5 mg total) by mouth once daily., Disp: 90 tablet, Rfl: 3    apixaban (ELIQUIS) 5 mg Tab, Take 1 tablet (5 mg total) by mouth 2 (two) times daily., Disp: 180 tablet, Rfl: 3    ascorbic acid, vitamin C, (VITAMIN C) 100 MG tablet, Take 100 mg by mouth daily as needed., Disp: , Rfl:     atorvastatin (LIPITOR) 80 MG tablet, Take 1 tablet (80 mg total) by mouth every evening., Disp: 90 tablet, Rfl: 3    b complex vitamins tablet, Take 1 tablet by mouth once daily., Disp: , Rfl:     BD ALCOHOL SWABS PadM, , Disp: , Rfl:     BD ULTRA-FINE ROLAND PEN NEEDLES 32 gauge x 5/32" Ndle, , Disp: , Rfl:     celecoxib (CELEBREX) 100 MG capsule, Take 1 capsule (100 mg total) by mouth once daily., Disp: 30 capsule, Rfl: 6    clopidogreL (PLAVIX) 75 mg tablet, Take 1 tablet (75 mg total) by mouth once daily., Disp: " 90 tablet, Rfl: 3    ergocalciferol (ERGOCALCIFEROL) 50,000 unit Cap, TAKE ONE CAPSULE BY MOUTH WEEKLY, Disp: 12 capsule, Rfl: 2    fenofibrate 160 MG Tab, TAKE 1 TABLET EVERY MORNING, Disp: 90 tablet, Rfl: 4    furosemide (LASIX) 20 MG tablet, Take 1 tablet (20 mg total) by mouth 2 (two) times daily., Disp: 180 tablet, Rfl: 3    gabapentin (NEURONTIN) 100 MG capsule, TAKE 2 CAPSULES IN THE MORNING AND 3 CAPSULES WITH DINNER, Disp: 450 capsule, Rfl: 0    glimepiride (AMARYL) 2 MG tablet, TAKE 1 TABLET EVERY DAY BEFORE BREAKFAST, Disp: 90 tablet, Rfl: 2    hydrALAZINE (APRESOLINE) 50 MG tablet, Take 1 tablet (50 mg total) by mouth 2 (two) times a day., Disp: 180 tablet, Rfl: 3    insulin degludec (TRESIBA FLEXTOUCH U-100) 100 unit/mL (3 mL) insulin pen, Inject 30 Units into the skin once daily., Disp: , Rfl:     isosorbide mononitrate (IMDUR) 120 MG 24 hr tablet, Take 2 tablets (240 mg total) by mouth once daily., Disp: 180 tablet, Rfl: 3    magnesium 250 mg Tab, Take 1 tablet by mouth once daily. , Disp: , Rfl:     metFORMIN (GLUCOPHAGE-XR) 500 MG ER 24hr tablet, Take 2 tablets twice daily with food, Disp: 360 tablet, Rfl: 2    nitroGLYCERIN (NITROSTAT) 0.4 MG SL tablet, Place 1 tablet (0.4 mg total) under the tongue every 5 (five) minutes as needed for Chest pain., Disp: 25 tablet, Rfl: 11    omega-3 acid ethyl esters (LOVAZA) 1 gram capsule, Take 2 capsules (2 g total) by mouth 2 (two) times daily., Disp: 120 capsule, Rfl: 12    semaglutide (OZEMPIC) 1 mg/dose (4 mg/3 mL), Inject 1 mg into the skin every 7 days., Disp: 1 pen, Rfl: 11    tiZANidine (ZANAFLEX) 4 MG tablet, Take 4 mg by mouth every 6 (six) hours as needed., Disp: , Rfl:     triazolam (HALCION) 0.25 MG Tab, TK 1 T PO QHS, prn, Disp: 30 tablet, Rfl: 3    pantoprazole (PROTONIX) 40 MG tablet, Take 1 tablet (40 mg total) by mouth once daily., Disp: 90 tablet, Rfl: 3  No current facility-administered medications for this visit.      Facility-Administered Medications Ordered in Other Visits:     cyclopentolate 1% ophthalmic solution 1 drop, 1 drop, Left Eye, On Call Procedure, Nickolas Hong MD, 1 drop at 02/24/22 0901    ofloxacin 0.3 % ophthalmic solution 1 drop, 1 drop, Left Eye, On Call Procedure, Nickolas Hong MD, 2 drop at 02/24/22 1017    sodium chloride 0.9% flush 10 mL, 10 mL, Intravenous, PRN, Nickolas Hong MD          Past Medical History:   Diagnosis Date    Chronic midline low back pain without sciatica 10/2/2017    Colon polyps      Coronary artery disease involving native coronary artery of native heart 3/5/2020    Coronary artery disease involving native coronary artery of native heart with angina pectoris 12/10/2020    Diabetes mellitus with neurological manifestations, uncontrolled 1/24/2017    Diabetic polyneuropathy associated with type 2 diabetes mellitus 1/24/2017    Diabetic polyneuropathy associated with type 2 diabetes mellitus      Essential hypertension 1/24/2017    Gastroesophageal reflux disease 1/24/2017    Hyperlipidemia 1/24/2017    Insomnia 1/24/2017    Long-term insulin use 1/24/2017    Longstanding persistent atrial fibrillation 12/30/2020    Lumbar spondylosis 11/13/2017    Nuclear sclerosis of both eyes 8/24/2017    Obesity      PAD (peripheral artery disease) 3/23/2022    Stage 3 chronic kidney disease 2/13/2019    Uncontrolled type 2 diabetes mellitus without complication, with long-term current use of insulin 1/24/2017         Active Problem List with Overview Notes     Diagnosis Date Noted    Atherosclerosis of both carotid arteries 03/23/2022    PAD (peripheral artery disease) 03/23/2022    Nuclear sclerotic cataract of left eye 02/24/2022    Weakness of shoulder 11/15/2021    Winging of scapula 11/15/2021    Impaired mobility and ADLs 11/15/2021    Cervical radiculopathy 06/04/2021    Cervical spondylosis 06/04/2021    DDD (degenerative disc  "disease), cervical 06/04/2021    Long term (current) use of anticoagulants 12/31/2020    Longstanding persistent atrial fibrillation 12/30/2020    Coronary artery disease involving coronary bypass graft of native heart with unstable angina pectoris 12/10/2020    Pre-op testing 11/11/2020    Sedative dependence 08/18/2020    Aortic atherosclerosis 03/11/2020       "Moderate atherosclerosis is seen within the distal abdominal aorta" CTA Chest Non Coronary 10-       Chest pain, atypical 03/05/2020    Hx of CABG 03/05/2020       3 vessel WJ 2016       Coronary artery disease involving native coronary artery of native heart 03/05/2020    Diabetes mellitus due to underlying condition with stage 3 chronic kidney disease, with long-term current use of insulin 11/15/2019    Bilateral sacroiliitis 02/13/2019    Stage 3a chronic kidney disease 02/13/2019    Lumbosacral spondylosis 11/28/2018    Obesity (BMI 30.0-34.9) 04/26/2018    Pseudophakia 04/12/2018    Lumbar radiculopathy 11/29/2017    Lumbar spondylosis 11/13/2017    DDD (degenerative disc disease), lumbar 11/13/2017    Postlaminectomy syndrome of lumbar region 11/13/2017    Lumbar radiculopathy, right 11/13/2017    Chronic midline low back pain without sciatica 10/02/2017    Senile nuclear sclerosis 08/29/2017    Nuclear sclerosis, left 08/24/2017    Posterior subcapsular age-related cataract of both eyes 08/24/2017    Refractive error 08/24/2017    Chronic right shoulder pain 08/22/2017    Decreased strength of upper extremity 08/22/2017    Decreased range of motion (ROM) of shoulder 08/22/2017    Posture imbalance 08/22/2017    DM type 2 without retinopathy 01/24/2017    Diabetes mellitus with neurological manifestations, uncontrolled 01/24/2017    Insomnia 01/24/2017    Gastroesophageal reflux disease 01/24/2017    Long-term insulin use 01/24/2017    Mixed hyperlipidemia 01/24/2017    Essential hypertension 01/24/2017    " Diabetic polyneuropathy associated with type 2 diabetes mellitus 01/24/2017               Past Surgical History:   Procedure Laterality Date    BACK SURGERY         2002    CATARACT EXTRACTION W/  INTRAOCULAR LENS IMPLANT Right 08/29/2017     Dr. Hong    CATARACT EXTRACTION W/  INTRAOCULAR LENS IMPLANT Left 02/24/2022     Dr. Hong    COLONOSCOPY N/A 2/21/2017     Procedure: COLONOSCOPY;  Surgeon: Robb Rosado MD;  Location: [facility] ENDO;  Service: Endoscopy;  Laterality: N/A;    CORONARY ANGIOGRAPHY INCLUDING BYPASS GRAFTS WITH CATHETERIZATION OF LEFT HEART N/A 11/11/2020     Procedure: ANGIOGRAM, CORONARY, INCLUDING BYPASS GRAFT, WITH LEFT HEART CATHETERIZATION;  Surgeon: Lane Cuellar MD;  Location: [facility] CATH LAB;  Service: Cardiology;  Laterality: N/A;  RN PRE OP 11-5-2020. --COVID NEGATIVE ON  11-. C A    CORONARY ARTERY BYPASS GRAFT         March 2016    EPIDURAL STEROID INJECTION Bilateral 11/14/2018     Procedure: Lumbar Medial Branch Blocks;  Surgeon: Eze Byrd Jr., MD;  Location: [facility] ENDO;  Service: Pain Management;  Laterality: Bilateral;  Bilateral Lumbar Medial Branch Blocks L2, L3, L4, L5     [id number]  [id number]     Arrive @ 1150; NO Sedation    EPIDURAL STEROID INJECTION Bilateral 11/28/2018     Procedure: Injection, Steroid, Epidural;  Surgeon: Eze Byrd Jr., MD;  Location: [facility] ENDO;  Service: Pain Management;  Laterality: Bilateral;  Bilateral Sacroiliac Joint Steroid Injections     [id number]     Arrive @ 0910    EPIDURAL STEROID INJECTION Bilateral 3/20/2019     Procedure: Injection, Steroid, Sacroiliiac Joint;  Surgeon: Eze Byrd Jr., MD;  Location: [facility];  Service: Pain Management;  Laterality: Bilateral;  Bilateral Sacroiliac Joint Steroid Injections     [id number]     Arrive @ 1145; Trigger point injections also?    INTRAOCULAR PROSTHESES INSERTION Left 2/24/2022     Procedure: INSERTION, IOL PROSTHESIS;  Surgeon: Nickolas Hong MD;   Location: [facility] OR;  Service: Ophthalmology;  Laterality: Left;    PHACOEMULSIFICATION OF CATARACT Left 2/24/2022     Procedure: PHACOEMULSIFICATION, CATARACT;  Surgeon: Nickolas Hong MD;  Location: [facility] OR;  Service: Ophthalmology;  Laterality: Left;  RN Phone Pre Op 2-16-22.  Covid NEGATIVE  2-23-22.  Arrival 08:00 am.    TONSILLECTOMY

## 2022-07-06 ENCOUNTER — TELEPHONE (OUTPATIENT)
Dept: ORTHOPEDICS | Facility: CLINIC | Age: 72
End: 2022-07-06
Payer: MEDICARE

## 2022-07-06 NOTE — TELEPHONE ENCOUNTER
Returned patients call   Pain better with [facility] (power was out yesterday and he couldn't use it)  Can take celebrex, additional oxycodone if needed.   Asked about taking triazolam to help him sleep. This is a benzo. I advised against this as it increases his risk of accidental OD.   He will let me know if he has any other issues

## 2022-07-08 ENCOUNTER — CLINICAL SUPPORT (OUTPATIENT)
Dept: REHABILITATION | Facility: HOSPITAL | Age: 72
End: 2022-07-08
Payer: MEDICARE

## 2022-07-08 DIAGNOSIS — M25.611 DECREASED RANGE OF MOTION OF RIGHT SHOULDER: ICD-10-CM

## 2022-07-08 DIAGNOSIS — M75.112 NONTRAUMATIC INCOMPLETE TEAR OF LEFT ROTATOR CUFF: ICD-10-CM

## 2022-07-08 DIAGNOSIS — R29.898 IMPAIRED STRENGTH OF SHOULDER MUSCLES: ICD-10-CM

## 2022-07-08 PROCEDURE — 97110 THERAPEUTIC EXERCISES: CPT | Mod: PN

## 2022-07-08 PROCEDURE — 97161 PT EVAL LOW COMPLEX 20 MIN: CPT | Mod: PN

## 2022-07-08 NOTE — PLAN OF CARE
[facility]  Physical Therapy Initial Evaluation    Name: Driss Hernandez Jr.  Clinic Number: [id number]    Therapy Diagnosis:   Encounter Diagnoses   Name Primary?    Nontraumatic incomplete tear of left rotator cuff     Decreased range of motion of right shoulder     Impaired strength of shoulder muscles      Physician: Allison Hemphill PA-C    Physician Orders: PT Eval and Treat   Medical Diagnosis from Referral: M75.112 (ICD-10-CM) - Nontraumatic incomplete tear of left rotator cuff  Evaluation Date: 7/8/2022  Plan of Care Expiration: 10/8/22    Authorization Period Expiration: 7/5/23  Visit # / Visits authorized: 1/ 1      Time In: 1345  Time Out: 1430    Total Billable Time: 45 minutes    POSTOPERATIVE DIAGNOSES:   Right   1. Rotator cuff tear, partial thickness tear involving the  supraspinatus  2. Biceps tendinopathy  3. Shoulder synovitis  4. Subacromial bursitis     OPERATION:   Right   1. Shoulder arthroscopic  supraspinatus rotator cuff repair  2. Arthroscopic biceps tenodesis   3. Arthroscopic subacromial decompression, bursectomy     Precautions: Standard, s/p Right partial thickness RTC repair with biceps tenodesis    POSTOPERATIVE PLAN: Plan to follow [facility] rehab protocol + biceps tenodesis. AROM of elbow ok, no resistive elbow exercises until 8     Subjective   Date of onset: 7/5/22    History of current condition:   Driss  is a 71 year old right handed male s/p Right partial thickness RTC repair with biceps tenodesis 7/5/22.  He presents in an immobilizer.  He states he showered today keeping the incisions dry. He states his wife helped him changed the bandages.  He states he is retired. He states he is involved in building and home projects.  He states he is involved in yardwork and is anxious to get back. His goal is to gain full functional mobility of the involved dominant right shoulder.      Medical History:   Past Medical History:   Diagnosis Date     Chronic midline low back pain without sciatica 10/2/2017    Colon polyps     Coronary artery disease involving native coronary artery of native heart 3/5/2020    Coronary artery disease involving native coronary artery of native heart with angina pectoris 12/10/2020    Diabetes mellitus with neurological manifestations, uncontrolled 1/24/2017    Diabetic polyneuropathy associated with type 2 diabetes mellitus 1/24/2017    Diabetic polyneuropathy associated with type 2 diabetes mellitus     Essential hypertension 1/24/2017    Gastroesophageal reflux disease 1/24/2017    Hyperlipidemia 1/24/2017    Insomnia 1/24/2017    Long-term insulin use 1/24/2017    Longstanding persistent atrial fibrillation 12/30/2020    Lumbar spondylosis 11/13/2017    Nuclear sclerosis of both eyes 8/24/2017    Obesity     PAD (peripheral artery disease) 3/23/2022    Stage 3 chronic kidney disease 2/13/2019    Uncontrolled type 2 diabetes mellitus without complication, with long-term current use of insulin 1/24/2017       Surgical History:   Driss Gomez Mary Akbar  has a past surgical history that includes Colonoscopy (N/A, 2/21/2017); Coronary artery bypass graft; Back surgery; Tonsillectomy; Epidural steroid injection (Bilateral, 11/14/2018); Epidural steroid injection (Bilateral, 11/28/2018); Epidural steroid injection (Bilateral, 3/20/2019); Coronary angiography including bypass grafts with catheterization of left heart (N/A, 11/11/2020); Cataract extraction w/  intraocular lens implant (Right, 08/29/2017); Cataract extraction w/  intraocular lens implant (Left, 02/24/2022); Phacoemulsification of cataract (Left, 2/24/2022); Intraocular prosthesis insertion (Left, 2/24/2022); and Arthroscopic repair of rotator cuff of shoulder (Right, 7/5/2022).    Medications:   Driss has a current medication list which includes the following prescription(s): accu-chek tish control soln, accu-chek tish plus test strp, accu-chek  softclix lancets, acetaminophen, albuterol, ascorbic acid (vitamin c), atorvastatin, b complex vitamins, bd alcohol swabs, bd ultra-fine diego pen needle, celecoxib, clopidogrel, coenzyme q10, dapagliflozin, eliquis, ergocalciferol, fenofibrate, furosemide, gabapentin, glimepiride, hydralazine, tresiba flextouch u-100, isosorbide mononitrate, isosorbide mononitrate, magnesium, metformin, nitroglycerin, omega-3 acid ethyl esters, ondansetron, oxycodone, pantoprazole, semaglutide, tizanidine, and triazolam, and the following Facility-Administered Medications: cyclopentolate 1%, ofloxacin, and sodium chloride 0.9%.    Allergies:   Review of patient's allergies indicates:   Allergen Reactions    Penicillins Other (See Comments)     Unknown reaction, Had a reaction as a child    Shrimp Itching     Hand itching        Prior Therapy:  Recent, right shoulder    Current Activity Level: active in yard, refinishing safes  Occupation: retired  Prior Level of Function: independent   Current Level of Function: difficulty with rec activities    Pain:  Current 4/10, worst 8/10, best 4/10   Location: right shoulder     Aggravating Factors: lifting, reaching, ADL's, yardwork  Easing Factors: oxydcodone    Pts goals: His goal is to gain full functional mobility of the involved dominant right shoulder.       Objective       Observation: Pt presents in an immobilizer with rounded shoulders, forward head posture.  Incisions covered with band-aids  Palpation:  Mild per-incision tenderness globally.      Range of Motion/Strength:     Shoulder Range of Motion:   ACTIVE ROM LEFT RIGHT   Flexion 150 NT   Abduction 140 NT   IR Functional T12 NT   ER Functional T2 NT      PASSIVE ROM LEFT RIGHT   Flexion NT 90   Abduction NT 45      STRENGTH LEFT RIGHT   Flexion 5/5 3-   Abduction 4/5 3-   Extension NT NT   IR (neutral) 4+/5 abdomen   ER (neutral) 4-/5 10 deg   Elbow Flexion 5/5 NT        AROM: C/S: WFL  : R: 4- L: 4    CMS  Impairment/Limitation/Restriction for FOTO Shoulder Survey  Status Limitation G-Code CMS Severity Modifier  Intake 20% 80% Current Status CM - At least 80 percent but less than 100 percent  Predicted 60% 40% Goal Status+ CK - At least 40 percent but less than 60 percent    TREATMENT     Treatment Time In: 1415  Treatment Time Out: 1430    Total Treatment time separate from Evaluation: 15 minutes    Driss received therapeutic exercises to develop strength, endurance, ROM, flexibility, posture and core stabilization for 10 minutes including:   -scapular retraction 2 x 10  -shrugs 2 x 10  -elbow/flex/ext, gentle AROM  -wrist flex, ext, pron, sup x 30  -upper trap stretch 20 sec x 4  -codman's x 30    Driss received the following manual therapy techniques:  were applied to the: right shoulder for 10 minutes, including:  -PROM      Education provided:   - postural awareness    Written Home Exercises Provided: yes.    Exercises were reviewed and Driss was able to demonstrate them prior to the end of the session.  Driss demonstrated good  understanding of the education provided.     See EMR under Patient Instructions for exercises provided 7/8/2022.    Assessment     Pt presents with signs and symptoms consistent with referring diagnosis. Evaluation has determined a decrease in functional status and subjective and objective deficits that can be addressed by physical therapy intervention. Pt demonstrates pain limiting functional activities. Decreased flexibility and strength limiting normal movement patterns. Decreased segmental motion. Decreased postural strength and awareness. Positive special testing. Decreased participation in functional and recreational activities. Subjective and objective measures are addressed by goals in the plan of care.  Patient/family are involved in the development of these goals. Patient/family are educated about current injury and treatment.       Plan of care was dicussed with patient. Pt will  benefit from skilled outpatient Physical Therapy to address the deficits stated above and in the chart below, provide pt/family education, and to maximize pt's level of independence. Pt's spiritual, cultural and educational needs considered and patient is agreeable to the plan of care and goals as stated below:     Pt prognosis is Good.  Anticipated Barriers for therapy: none    Medical Necessity is demonstrated by the following  History  Co-morbidities and personal factors that may impact the plan of care Co-morbidities:   Chronic midline low back pain without sciatica   Colon polyps   Coronary artery disease involving native coronary artery of native heart   Coronary artery disease involving native coronary artery of native heart with angina pectoris   Diabetes mellitus with neurological manifestations, uncontrolled   Diabetic polyneuropathy associated with type 2 diabetes mellitus   Diabetic polyneuropathy associated with type 2 diabetes mellitus   Essential hypertension   Gastroesophageal reflux disease   Hyperlipidemia   Insomnia   Long-term insulin use   Longstanding persistent atrial fibrillation   Lumbar spondylosis   Nuclear sclerosis of both eyes   Obesity   PAD (peripheral artery disease)   Stage 3 chronic kidney disease   Uncontrolled type 2 diabetes mellitus without complication, with long-term current use of insulin       Personal Factors:   no deficits     low   Examination  Body Structures and Functions, activity limitations and participation restrictions that may impact the plan of care Body Regions:   upper extremities    Body Systems:    gross symmetry  ROM  strength    Participation Restrictions:   Yardwork, housework    Activity limitations:   Learning and applying knowledge  no deficits    General Tasks and Commands  no deficits    Communication  no deficits    Mobility  lifting and carrying objects    Self care  washing oneself (bathing, drying, washing  hands)  toileting  dressing  eating  drinking    Domestic Life  shopping  cooking  doing house work (cleaning house, washing dishes, laundry)    Interactions/Relationships  no deficits    Life Areas  no deficits    Community and Social Life  community life  recreation and leisure         low   Clinical Presentation stable and uncomplicated low   Decision Making/ Complexity Score: low     Goals:    Short Term Goals (4 Weeks):     1.Pt to increase strength by a 1/2 grade of muscles test to allow for improvement in functional activities such as performing chores.  2.Pt to improve range of motion by 25% to allow for improved functional mobility to allow for improvement in IADLs.   3.Pt to report compliance with HEP and demonstrate proper exercise technique to PT to show competence with self management of condition.  4.Decrease pain by 25% during functional activities.    Long Term Goals (12 Weeks):     1. Increase ROM to allow improved joint biomechanics during functional activities.   2.Increase trunk and lower extremity strength to within normal limits during functional activities.   3. Independent with home exercise program.   4. Full return to functional activities with manageable complaints.  5. Patient to demonstrate improved posture and body mechanics.  6. Decrease pain by 75% during functional activities.    Plan     Plan of care Certification: 7/8/2022 to 10/8/22.    Recommended Treatment Plan: 2 times per week for 12 weeks with treatments to consist of:  Neuromuscular and postural re-education,  training, therapeutic exercise, therapeutic activities,balance training, gait training, manual therapy, soft tissue mobilization, ROM exercises, Cardiovascular,  Postural stabilization, manual traction, spinal mobilization, moist heat, cryotherapy, electrical stimulation, ultrasound, home exercise education and planning.    Steve Joyce, PT

## 2022-07-14 ENCOUNTER — CLINICAL SUPPORT (OUTPATIENT)
Dept: REHABILITATION | Facility: HOSPITAL | Age: 72
End: 2022-07-14
Payer: MEDICARE

## 2022-07-14 DIAGNOSIS — R29.898 IMPAIRED STRENGTH OF SHOULDER MUSCLES: ICD-10-CM

## 2022-07-14 DIAGNOSIS — M25.611 DECREASED RANGE OF MOTION OF RIGHT SHOULDER: Primary | ICD-10-CM

## 2022-07-14 PROCEDURE — 97110 THERAPEUTIC EXERCISES: CPT | Mod: PN

## 2022-07-14 PROCEDURE — 97140 MANUAL THERAPY 1/> REGIONS: CPT | Mod: PN

## 2022-07-14 NOTE — PROGRESS NOTES
[facility]   Physical Therapy Treatment Note     Name: Driss Hernandez Jr.  Clinic Number: [id number]    Therapy Diagnosis:   Encounter Diagnoses   Name Primary?    Decreased range of motion of right shoulder Yes    Impaired strength of shoulder muscles      Physician: Allison Hemphill PA-C    Visit Date: 7/14/2022    Physician: Allison Hemphill PA-C     Physician Orders: PT Eval and Treat   Medical Diagnosis from Referral: M75.112 (ICD-10-CM) - Nontraumatic incomplete tear of left rotator cuff  Evaluation Date: 7/8/2022  Plan of Care Expiration: 12/31/2022    Authorization Period Expiration: 7/5/23  Visit # / Visits authorized: 1/ 20    Precautions: Standard, s/p Right partial thickness RTC repair with biceps tenodesis     POSTOPERATIVE PLAN: Plan to follow [facility] rehab protocol + biceps tenodesis. AROM of elbow ok, no resistive elbow exercises until 8     DOS: 7-5-22         POW: 1 wee and 2 days (as of 7-14-22)    Time In: 10:00 am  Time Out: 10:49 am  Total Billable Time: 39 minutes      SUBJECTIVE     Pt reports: that he has minor pain today. Pt states he has increase in L shoulder pain when he fall asleep since he wakes up in different position.   He was compliant with home exercise program.  Response to previous treatment: None  Functional change: No change     Pain: 4/10  Location: left shoulder       OBJECTIVE     Objective Measures updated at progress report unless specified.     Updated 7-14-22  PROM shoulder flexion: 30 deg    TREATMENT     Driss received the treatments listed below:      received therapeutic exercises to develop strength and ROM for 29  minutes including:    scapular retraction 3 x 10  -shrugs 3 x 10  -elbow/flex/ext, gentle AAROM 1x15   Digiflex  (blue foam) 3x10  -wrist flex, ext, pron, sup x 30  -upper trap stretch 30 sec x 5  -codman's x 30    received the following manual therapy techniques: Soft tissue Mobilization were applied to the: R  shoulder for 10 minutes, including:  PROM shoulder flexion  Shoulder oscillation     PATIENT EDUCATION AND HOME EXERCISES     Education provided:   - postural awareness     Written Home Exercises Provided: yes.     Exercises were reviewed and Driss was able to demonstrate them prior to the end of the session.  Driss demonstrated good  understanding of the education provided.      See EMR under Patient Instructions for exercises provided 7/8/2022.    ASSESSMENT     Pt presented to therapy for first f/i. Pt ambulated with sling brace without abd pillow today. Pt required mod v/c's to perform therex with proper form. PROM was performed to 30 deg flexion. Pt demonstrated mod R shoulder guarding. Heavy v/c's to relax R UE during PROM. Minor provocation of pain during PROM today. Pt will benefit from skilled PT services to decrease pain to return to PLOF.    Driss Is progressing well towards his goals.   Pt prognosis is Good.     Pt will continue to benefit from skilled outpatient physical therapy to address the deficits listed in the problem list box on initial evaluation, provide pt/family education and to maximize pt's level of independence in the home and community environment.     Pt's spiritual, cultural and educational needs considered and pt agreeable to plan of care and goals.     Anticipated barriers to physical therapy: none    Short Term Goals (4 Weeks):      1.Pt to increase strength by a 1/2 grade of muscles test to allow for improvement in functional activities such as performing chores.  2.Pt to improve range of motion by 25% to allow for improved functional mobility to allow for improvement in IADLs.   3.Pt to report compliance with HEP and demonstrate proper exercise technique to PT to show competence with self management of condition.  4.Decrease pain by 25% during functional activities.     Long Term Goals (12 Weeks):      1. Increase ROM to allow improved joint biomechanics during functional activities.    2.Increase trunk and lower extremity strength to within normal limits during functional activities.   3. Independent with home exercise program.   4. Full return to functional activities with manageable complaints.  5. Patient to demonstrate improved posture and body mechanics.  6. Decrease pain by 75% during functional activities.    PLAN     Plan of care Certification: 7/8/2022 to 10/8/22.    Junior Hussein, PT

## 2022-07-19 ENCOUNTER — OFFICE VISIT (OUTPATIENT)
Dept: ORTHOPEDICS | Facility: CLINIC | Age: 72
End: 2022-07-19
Payer: MEDICARE

## 2022-07-19 VITALS
SYSTOLIC BLOOD PRESSURE: 128 MMHG | WEIGHT: 187.38 LBS | RESPIRATION RATE: 18 BRPM | HEART RATE: 80 BPM | OXYGEN SATURATION: 98 % | DIASTOLIC BLOOD PRESSURE: 56 MMHG | BODY MASS INDEX: 29.35 KG/M2

## 2022-07-19 DIAGNOSIS — M75.112 NONTRAUMATIC INCOMPLETE TEAR OF LEFT ROTATOR CUFF: Primary | ICD-10-CM

## 2022-07-19 PROCEDURE — 1159F MED LIST DOCD IN RCRD: CPT | Mod: CPTII,S$GLB,, | Performed by: ORTHOPAEDIC SURGERY

## 2022-07-19 PROCEDURE — 1160F RVW MEDS BY RX/DR IN RCRD: CPT | Mod: CPTII,S$GLB,, | Performed by: ORTHOPAEDIC SURGERY

## 2022-07-19 PROCEDURE — 3051F PR MOST RECENT HEMOGLOBIN A1C LEVEL 7.0 - < 8.0%: ICD-10-PCS | Mod: CPTII,S$GLB,, | Performed by: ORTHOPAEDIC SURGERY

## 2022-07-19 PROCEDURE — 3060F POS MICROALBUMINURIA REV: CPT | Mod: CPTII,S$GLB,, | Performed by: ORTHOPAEDIC SURGERY

## 2022-07-19 PROCEDURE — 3066F PR DOCUMENTATION OF TREATMENT FOR NEPHROPATHY: ICD-10-PCS | Mod: CPTII,S$GLB,, | Performed by: ORTHOPAEDIC SURGERY

## 2022-07-19 PROCEDURE — 99024 POSTOP FOLLOW-UP VISIT: CPT | Mod: S$GLB,,, | Performed by: ORTHOPAEDIC SURGERY

## 2022-07-19 PROCEDURE — 3060F PR POS MICROALBUMINURIA RESULT DOCUMENTED/REVIEW: ICD-10-PCS | Mod: CPTII,S$GLB,, | Performed by: ORTHOPAEDIC SURGERY

## 2022-07-19 PROCEDURE — 1160F PR REVIEW ALL MEDS BY PRESCRIBER/CLIN PHARMACIST DOCUMENTED: ICD-10-PCS | Mod: CPTII,S$GLB,, | Performed by: ORTHOPAEDIC SURGERY

## 2022-07-19 PROCEDURE — 99999 PR PBB SHADOW E&M-EST. PATIENT-LVL III: CPT | Mod: PBBFAC,,, | Performed by: ORTHOPAEDIC SURGERY

## 2022-07-19 PROCEDURE — 99999 PR PBB SHADOW E&M-EST. PATIENT-LVL III: ICD-10-PCS | Mod: PBBFAC,,, | Performed by: ORTHOPAEDIC SURGERY

## 2022-07-19 PROCEDURE — 99024 PR POST-OP FOLLOW-UP VISIT: ICD-10-PCS | Mod: S$GLB,,, | Performed by: ORTHOPAEDIC SURGERY

## 2022-07-19 PROCEDURE — 1125F AMNT PAIN NOTED PAIN PRSNT: CPT | Mod: CPTII,S$GLB,, | Performed by: ORTHOPAEDIC SURGERY

## 2022-07-19 PROCEDURE — 3072F PR LOW RISK FOR RETINOPATHY: ICD-10-PCS | Mod: CPTII,S$GLB,, | Performed by: ORTHOPAEDIC SURGERY

## 2022-07-19 PROCEDURE — 1159F PR MEDICATION LIST DOCUMENTED IN MEDICAL RECORD: ICD-10-PCS | Mod: CPTII,S$GLB,, | Performed by: ORTHOPAEDIC SURGERY

## 2022-07-19 PROCEDURE — 3074F PR MOST RECENT SYSTOLIC BLOOD PRESSURE < 130 MM HG: ICD-10-PCS | Mod: CPTII,S$GLB,, | Performed by: ORTHOPAEDIC SURGERY

## 2022-07-19 PROCEDURE — 3008F PR BODY MASS INDEX (BMI) DOCUMENTED: ICD-10-PCS | Mod: CPTII,S$GLB,, | Performed by: ORTHOPAEDIC SURGERY

## 2022-07-19 PROCEDURE — 3072F LOW RISK FOR RETINOPATHY: CPT | Mod: CPTII,S$GLB,, | Performed by: ORTHOPAEDIC SURGERY

## 2022-07-19 PROCEDURE — 3074F SYST BP LT 130 MM HG: CPT | Mod: CPTII,S$GLB,, | Performed by: ORTHOPAEDIC SURGERY

## 2022-07-19 PROCEDURE — 3066F NEPHROPATHY DOC TX: CPT | Mod: CPTII,S$GLB,, | Performed by: ORTHOPAEDIC SURGERY

## 2022-07-19 PROCEDURE — 3008F BODY MASS INDEX DOCD: CPT | Mod: CPTII,S$GLB,, | Performed by: ORTHOPAEDIC SURGERY

## 2022-07-19 PROCEDURE — 3078F DIAST BP <80 MM HG: CPT | Mod: CPTII,S$GLB,, | Performed by: ORTHOPAEDIC SURGERY

## 2022-07-19 PROCEDURE — 3078F PR MOST RECENT DIASTOLIC BLOOD PRESSURE < 80 MM HG: ICD-10-PCS | Mod: CPTII,S$GLB,, | Performed by: ORTHOPAEDIC SURGERY

## 2022-07-19 PROCEDURE — 3051F HG A1C>EQUAL 7.0%<8.0%: CPT | Mod: CPTII,S$GLB,, | Performed by: ORTHOPAEDIC SURGERY

## 2022-07-19 PROCEDURE — 1125F PR PAIN SEVERITY QUANTIFIED, PAIN PRESENT: ICD-10-PCS | Mod: CPTII,S$GLB,, | Performed by: ORTHOPAEDIC SURGERY

## 2022-07-19 NOTE — PROGRESS NOTES
Postoperative Visit    History of Present Illness:   Driss is 2 weeks s/p right shoulder arthroscopy , rotator cuff repair and arthroscopic biceps tenodesis  (DOS-7/5/22)  He is doing well -- pain is well controlled but has been worse over the last few days. Has been doing some activity with the R arm   She is compliant with activity restrictions and is wearing sling as instructed  Denies fevers, chills, constitutional symptoms     Physical Examination:    Vitals:    07/19/22 1143   BP: (!) 128/56   Pulse: 80   Resp: 18   SpO2: 98%   Weight: 85 kg (187 lb 6.3 oz)   PainSc:   9   PainLoc: Shoulder      NAD  right upper extremity:  Incisions over the shoulder healing well with suture in place   No erythema, drainageor other signs of infection. Appropriate post operative swelling is present  LTSI m/u/r  2+ RP  + EPL, IO, FDS, FDP     Assessment/Plan:  71 y.o. male   2  weeks s/p right shoulder arthroscopy , rotator cuff repair and arthroscopic biceps tenodesis  (DOS-7/5/22)    Plan  1. Sutures were removed today and steri strips were placed   2. Sling - use pillow. Do not use arm for activities. No active motion of the arm. Has not been compliant with post op restrictions.  3. PT - continue  4. Follow up in 4 weeks      All questions were answered in detail. The patient is in full agreement with the treatment plan and will proceed accordingly.

## 2022-07-20 ENCOUNTER — CLINICAL SUPPORT (OUTPATIENT)
Dept: REHABILITATION | Facility: HOSPITAL | Age: 72
End: 2022-07-20
Payer: MEDICARE

## 2022-07-20 DIAGNOSIS — M25.611 DECREASED RANGE OF MOTION OF RIGHT SHOULDER: Primary | ICD-10-CM

## 2022-07-20 DIAGNOSIS — R29.898 IMPAIRED STRENGTH OF SHOULDER MUSCLES: ICD-10-CM

## 2022-07-20 PROCEDURE — 97140 MANUAL THERAPY 1/> REGIONS: CPT | Mod: PN

## 2022-07-20 PROCEDURE — 97110 THERAPEUTIC EXERCISES: CPT | Mod: PN

## 2022-07-20 NOTE — PROGRESS NOTES
[facility]   Physical Therapy Treatment Note     Name: Driss Hernandez Jr.  Clinic Number: [id number]    Therapy Diagnosis:   Encounter Diagnoses   Name Primary?    Decreased range of motion of right shoulder Yes    Impaired strength of shoulder muscles      Physician: Allison Hemphill PA-C    Visit Date: 7/20/2022       Physician Orders: PT Eval and Treat   Medical Diagnosis from Referral: M75.112 (ICD-10-CM) - Nontraumatic incomplete tear of left rotator cuff  Evaluation Date: 7/8/2022  Plan of Care Expiration: 12/31/2022    Authorization Period Expiration: 7/5/23  Visit # / Visits authorized: 1/ 20    Precautions: Standard, s/p Right partial thickness RTC repair with biceps tenodesis     POSTOPERATIVE PLAN: Plan to follow [facility] rehab protocol + biceps tenodesis. AROM of elbow ok, no resistive elbow exercises until 8     DOS: 7-5-22         POW: 3    Time In: 1000  Time Out: 1048  Total Billable Time: 40 minutes      SUBJECTIVE     Pt reports: shoulder has been sore of late. He returns from follow up with MD with instructions to avoid using his arm and continue PT.  Pt states he was not compliant with avoiding moving his arm prior to MD follow up.      He was compliant with home exercise program.  Response to previous treatment: None  Functional change: No change     Pain: 4/10  Location: left shoulder       OBJECTIVE     Objective Measures updated at progress report unless specified.       TREATMENT     Driss received the treatments listed below:      received therapeutic exercises to develop strength and ROM for 30  minutes including:    scapular retraction 3 x 10  -shrugs 3 x 10  -elbow/flex/ext, gentle AAROM 1x15   -Digiflex  (blue foam) 3x10  -wrist flex, ext, pron, sup x 30  -gripper x 2 min  -upper trap stretch 30 sec x 5  -codman's x 30    received the following manual therapy techniques: Soft tissue Mobilization were applied to the: R shoulder for 10 minutes,  including:  PROM shoulder flexion  Shoulder oscillation     PATIENT EDUCATION AND HOME EXERCISES     Education provided:   - postural awareness     Written Home Exercises Provided: yes.     Exercises were reviewed and Driss was able to demonstrate them prior to the end of the session.  Driss demonstrated good  understanding of the education provided.      See EMR under Patient Instructions for exercises provided 7/8/2022.    ASSESSMENT     Pt presents today compliant with immobilizer. Continued therex consisting of sub-maximal passive GH and elbow PROM. Requires moderate cueing with postural awareness. Good response to exercise progression. Driss Is progressing well towards his goals.     Pt prognosis is Good.     Pt will continue to benefit from skilled outpatient physical therapy to address the deficits listed in the problem list box on initial evaluation, provide pt/family education and to maximize pt's level of independence in the home and community environment.     Pt's spiritual, cultural and educational needs considered and pt agreeable to plan of care and goals.     Anticipated barriers to physical therapy: none    Short Term Goals (4 Weeks):      1.Pt to increase strength by a 1/2 grade of muscles test to allow for improvement in functional activities such as performing chores.  2.Pt to improve range of motion by 25% to allow for improved functional mobility to allow for improvement in IADLs.   3.Pt to report compliance with HEP and demonstrate proper exercise technique to PT to show competence with self management of condition.  4.Decrease pain by 25% during functional activities.     Long Term Goals (12 Weeks):      1. Increase ROM to allow improved joint biomechanics during functional activities.   2.Increase trunk and lower extremity strength to within normal limits during functional activities.   3. Independent with home exercise program.   4. Full return to functional activities with manageable  complaints.  5. Patient to demonstrate improved posture and body mechanics.  6. Decrease pain by 75% during functional activities.    PLAN     Plan of care Certification: 7/8/2022 to 10/8/22.    Steve Joyce, PT

## 2022-07-21 ENCOUNTER — PATIENT MESSAGE (OUTPATIENT)
Dept: ORTHOPEDICS | Facility: CLINIC | Age: 72
End: 2022-07-21
Payer: MEDICARE

## 2022-07-22 ENCOUNTER — TELEPHONE (OUTPATIENT)
Dept: ORTHOPEDICS | Facility: CLINIC | Age: 72
End: 2022-07-22
Payer: MEDICARE

## 2022-07-22 RX ORDER — OXYCODONE HYDROCHLORIDE 5 MG/1
5 TABLET ORAL EVERY 6 HOURS PRN
Qty: 20 TABLET | Refills: 0 | Status: SHIPPED | OUTPATIENT
Start: 2022-07-22 | End: 2022-09-29

## 2022-07-22 NOTE — TELEPHONE ENCOUNTER
Spoke to patient and sent rx request and notified provider of patient`s condition.   ----- Message from Emma Junior sent at 7/22/2022  1:07 PM CDT -----  Type: Patient Call Back    Who called: self     What is the request in detail: Requesting to speak with a nurse regarding his medications, and states he notices his pain is at a level 10 when he wakes up in the morning and would like to know if this is normal.     Can the clinic reply by [facility]? Yes     Would the patient rather a call back or a response via [facility]? Call back     Best call back number: [phone]

## 2022-07-25 ENCOUNTER — CLINICAL SUPPORT (OUTPATIENT)
Dept: REHABILITATION | Facility: HOSPITAL | Age: 72
End: 2022-07-25
Payer: MEDICARE

## 2022-07-25 DIAGNOSIS — M25.611 DECREASED RANGE OF MOTION OF RIGHT SHOULDER: Primary | ICD-10-CM

## 2022-07-25 DIAGNOSIS — R29.898 IMPAIRED STRENGTH OF SHOULDER MUSCLES: ICD-10-CM

## 2022-07-25 PROCEDURE — 97110 THERAPEUTIC EXERCISES: CPT | Mod: PN

## 2022-07-25 NOTE — PROGRESS NOTES
[facility]   Physical Therapy Treatment Note     Name: Driss Hernandez Jr.  Clinic Number: [id number]    Therapy Diagnosis:   Encounter Diagnoses   Name Primary?    Decreased range of motion of right shoulder Yes    Impaired strength of shoulder muscles      Physician: Allison Hemphill PA-C    Visit Date: 7/25/2022       Physician Orders: PT Eval and Treat   Medical Diagnosis from Referral: M75.112 (ICD-10-CM) - Nontraumatic incomplete tear of left rotator cuff  Evaluation Date: 7/8/2022  Plan of Care Expiration: 12/31/2022    Authorization Period Expiration: 7/5/23  Visit # / Visits authorized: 1/ 20    Precautions: Standard, s/p Right partial thickness RTC repair with biceps tenodesis     POSTOPERATIVE PLAN: Plan to follow [facility] rehab protocol + biceps tenodesis. AROM of elbow ok, no resistive elbow exercises until 8     DOS: 7-5-22         POW: 3    Time In: 1130  Time Out: 19252  Total Billable Time: 40 minutes      SUBJECTIVE     Pt reports: pt states the shoulder has been feeling better of late since being more compliant with restrictions. .      He was compliant with home exercise program.  Response to previous treatment: None  Functional change: No change     Pain: 4/10  Location: left shoulder       OBJECTIVE     Objective Measures updated at progress report unless specified.       TREATMENT     Driss received the treatments listed below:      received therapeutic exercises to develop strength and ROM for 30  minutes including:    scapular retraction 3 x 10  -shrugs 3 x 10  -elbow/flex/ext, gentle AAROM 1x15   -Digiflex  (blue foam) 3x10  -wrist flex, ext, pron, sup x 30  -gripper x 2 min  -upper trap stretch 30 sec x 5  -codman's x 30  -Table slides 2 x 10  Scapular MRE's in sideyl    received the following manual therapy techniques: Soft tissue Mobilization were applied to the: R shoulder for 10 minutes, including:  PROM shoulder flexion,  Elbow flex/ext  Shoulder  oscillation     PATIENT EDUCATION AND HOME EXERCISES     Education provided:   - postural awareness     Written Home Exercises Provided: yes.     Exercises were reviewed and Driss was able to demonstrate them prior to the end of the session.  Driss demonstrated good  understanding of the education provided.      See EMR under Patient Instructions for exercises provided 7/8/2022.    ASSESSMENT     Pt presents today compliant with immobilizer. Continued therex consisting of sub-maximal passive GH and elbow PROM. Requires decreased cueing with postural awareness. Improved GH PROM in all planes.  Good response to exercise progression. Driss Is progressing well towards his goals.     Pt prognosis is Good.     Pt will continue to benefit from skilled outpatient physical therapy to address the deficits listed in the problem list box on initial evaluation, provide pt/family education and to maximize pt's level of independence in the home and community environment.     Pt's spiritual, cultural and educational needs considered and pt agreeable to plan of care and goals.     Anticipated barriers to physical therapy: none    Short Term Goals (4 Weeks):      1.Pt to increase strength by a 1/2 grade of muscles test to allow for improvement in functional activities such as performing chores.  2.Pt to improve range of motion by 25% to allow for improved functional mobility to allow for improvement in IADLs.   3.Pt to report compliance with HEP and demonstrate proper exercise technique to PT to show competence with self management of condition.  4.Decrease pain by 25% during functional activities.     Long Term Goals (12 Weeks):      1. Increase ROM to allow improved joint biomechanics during functional activities.   2.Increase trunk and lower extremity strength to within normal limits during functional activities.   3. Independent with home exercise program.   4. Full return to functional activities with manageable complaints.  5.  Patient to demonstrate improved posture and body mechanics.  6. Decrease pain by 75% during functional activities.    PLAN     Plan of care Certification: 7/8/2022 to 10/8/22.    Steve Joyce, PT

## 2022-08-01 ENCOUNTER — TELEPHONE (OUTPATIENT)
Dept: ENDOCRINOLOGY | Facility: CLINIC | Age: 72
End: 2022-08-01
Payer: MEDICARE

## 2022-08-04 ENCOUNTER — CLINICAL SUPPORT (OUTPATIENT)
Dept: REHABILITATION | Facility: HOSPITAL | Age: 72
End: 2022-08-04
Payer: MEDICARE

## 2022-08-04 DIAGNOSIS — R29.898 IMPAIRED STRENGTH OF SHOULDER MUSCLES: ICD-10-CM

## 2022-08-04 DIAGNOSIS — M25.611 DECREASED RANGE OF MOTION OF RIGHT SHOULDER: Primary | ICD-10-CM

## 2022-08-04 PROCEDURE — 97110 THERAPEUTIC EXERCISES: CPT | Mod: PN

## 2022-08-04 NOTE — PROGRESS NOTES
[facility]   Physical Therapy Treatment Note     Name: Driss Hernandez Jr.  Clinic Number: [id number]    Therapy Diagnosis:   Encounter Diagnoses   Name Primary?    Decreased range of motion of right shoulder Yes    Impaired strength of shoulder muscles      Physician: Allison Hemphill PA-C    Visit Date: 8/4/2022       Physician Orders: PT Eval and Treat   Medical Diagnosis from Referral: M75.112 (ICD-10-CM) - Nontraumatic incomplete tear of left rotator cuff  Evaluation Date: 7/8/2022  Plan of Care Expiration: 12/31/2022    Authorization Period Expiration: 7/5/23  Visit # / Visits authorized: 2/ 20    Precautions: Standard, s/p Right partial thickness RTC repair with biceps tenodesis     POSTOPERATIVE PLAN: Plan to follow [facility] rehab protocol + biceps tenodesis. AROM of elbow ok, no resistive elbow exercises until 8     DOS: 7-5-22         POW: 4    Time In: 1045  Time Out: 1130    Total Billable Time: 40 minutes      SUBJECTIVE     Pt reports: shoulder and biceps have been sore of late.      He was compliant with home exercise program.  Response to previous treatment: None  Functional change: No change     Pain: 4/10  Location: left shoulder       OBJECTIVE     Objective Measures updated at progress report unless specified.       TREATMENT     Driss received the treatments listed below:      received therapeutic exercises to develop strength and ROM for 35  minutes including:    Pulley x 5 min in scaption  4 way GH isometrics  scapular retraction 3 x 10  -shrugs 3 x 10  -elbow/flex/ext, gentle AAROM 1x15   -Digiflex  (blue foam) 3x10  -wrist flex, ext, pron, sup x 30  -gripper x 2 min  -upper trap stretch 30 sec x 5  -codman's x 30  -Table slides 2 x 10  -Scapular MRE's seated 2 x 10        received the following manual therapy techniques: Soft tissue Mobilization were applied to the: R shoulder for 10 minutes, including:  PROM GH,  Elbow flex/ext  GH oscillation      PATIENT EDUCATION AND HOME EXERCISES     Education provided:   - postural awareness     Written Home Exercises Provided: yes.     Exercises were reviewed and Driss was able to demonstrate them prior to the end of the session.  Driss demonstrated good  understanding of the education provided.      See EMR under Patient Instructions for exercises provided 7/8/2022.    ASSESSMENT     Pt presents today compliant with immobilizer. Continued therex consisting of sub-maximal GH and elbow AAROM. Requires min cueing with postural awareness. Improved GH PROM in all planes.  Good response to exercise progression. Driss Is progressing well towards his goals.     Pt prognosis is Good.     Pt will continue to benefit from skilled outpatient physical therapy to address the deficits listed in the problem list box on initial evaluation, provide pt/family education and to maximize pt's level of independence in the home and community environment.     Pt's spiritual, cultural and educational needs considered and pt agreeable to plan of care and goals.     Anticipated barriers to physical therapy: none    Short Term Goals (4 Weeks):      1.Pt to increase strength by a 1/2 grade of muscles test to allow for improvement in functional activities such as performing chores.  2.Pt to improve range of motion by 25% to allow for improved functional mobility to allow for improvement in IADLs.   3.Pt to report compliance with HEP and demonstrate proper exercise technique to PT to show competence with self management of condition.  4.Decrease pain by 25% during functional activities.     Long Term Goals (12 Weeks):      1. Increase ROM to allow improved joint biomechanics during functional activities.   2.Increase trunk and lower extremity strength to within normal limits during functional activities.   3. Independent with home exercise program.   4. Full return to functional activities with manageable complaints.  5. Patient to demonstrate  improved posture and body mechanics.  6. Decrease pain by 75% during functional activities.    PLAN     Plan of care Certification: 7/8/2022 to 10/8/22.    Steve Joyce, PT

## 2022-08-09 ENCOUNTER — CLINICAL SUPPORT (OUTPATIENT)
Dept: REHABILITATION | Facility: HOSPITAL | Age: 72
End: 2022-08-09
Payer: MEDICARE

## 2022-08-09 DIAGNOSIS — R29.898 IMPAIRED STRENGTH OF SHOULDER MUSCLES: ICD-10-CM

## 2022-08-09 DIAGNOSIS — M25.611 DECREASED RANGE OF MOTION OF RIGHT SHOULDER: Primary | ICD-10-CM

## 2022-08-09 PROCEDURE — 97110 THERAPEUTIC EXERCISES: CPT | Mod: PN

## 2022-08-09 NOTE — PROGRESS NOTES
[facility]   Physical Therapy Progress Note     Name: Driss Hernandez Jr.  Clinic Number: [id number]    Therapy Diagnosis:   Encounter Diagnoses   Name Primary?    Decreased range of motion of right shoulder Yes    Impaired strength of shoulder muscles      Physician: Allison Hemphill PA-C    Visit Date: 8/9/2022       Physician Orders: PT Eval and Treat   Medical Diagnosis from Referral: M75.112 (ICD-10-CM) - Nontraumatic incomplete tear of left rotator cuff  Evaluation Date: 7/8/2022  Plan of Care Expiration: 12/31/2022    Authorization Period Expiration: 7/5/23  Visit # / Visits authorized: 3/ 20    Precautions: Standard, s/p Right partial thickness RTC repair with biceps tenodesis     POSTOPERATIVE PLAN: Plan to follow [facility] rehab protocol + biceps tenodesis. AROM of elbow ok, no resistive elbow exercises until 8     DOS: 7-5-22         POW: 5    Time In: 1345  Time Out: 1430    Total Billable Time: 40 minutes      SUBJECTIVE     Pt reports: shoulder and biceps continue to be sore but doing better.      He was compliant with home exercise program.  Response to previous treatment: None  Functional change: No change     Pain: 4/10  Location: left shoulder       OBJECTIVE     Objective Measures updated at progress report unless specified.   Range of Motion/Strength:      Shoulder Range of Motion:   ACTIVE ROM LEFT RIGHT   Flexion 150 NT   Abduction 140 NT   IR Functional T12 NT   ER Functional T2 NT      PASSIVE ROM LEFT RIGHT   Flexion    Abduction NT 90      STRENGTH LEFT RIGHT   Flexion 5/5 3   Abduction 4/5 3   Extension NT NT   IR (neutral) 4+/5 25   ER (neutral) 4-/5 60   Elbow Flexion 5/5 NT          TREATMENT     Driss received the treatments listed below:      received therapeutic exercises to develop strength and ROM for 35  minutes including:    Pulley x 5 min in scaption  4 way GH isometrics  scapular retraction 3 x 10  -shrugs 3 x 10  -elbow/flex/ext, gentle  AAROM 1x15   -Digiflex  (blue foam) 3x10  -wrist flex, ext, pron, sup x 30  -gripper x 2 min  -upper trap stretch 30 sec x 5  -codman's x 30  -Table slides 2 x 10 incline  -supine wand ER x 15  -Scapular MRE's seated 2 x 10        received the following manual therapy techniques: Soft tissue Mobilization were applied to the: R shoulder for 10 minutes, including:  PROM GH,  Elbow flex/ext  GH oscillation     PATIENT EDUCATION AND HOME EXERCISES     Education provided:   - postural awareness     Written Home Exercises Provided: yes.     Exercises were reviewed and Driss was able to demonstrate them prior to the end of the session.  Driss demonstrated good  understanding of the education provided.      See EMR under Patient Instructions for exercises provided 7/8/2022.    ASSESSMENT     Pt presents today compliant with immobilizer. Continued therex consisting of sub-maximal GH and elbow AAROM. Requires decreased cueing with postural awareness.  Good response to exercise progression. Driss Is progressing well towards his goals.     Pt prognosis is Good.     Pt will continue to benefit from skilled outpatient physical therapy to address the deficits listed in the problem list box on initial evaluation, provide pt/family education and to maximize pt's level of independence in the home and community environment.     Pt's spiritual, cultural and educational needs considered and pt agreeable to plan of care and goals.     Anticipated barriers to physical therapy: none    Short Term Goals (4 Weeks):  Updated  8/9/22 MET     1.Pt to increase strength by a 1/2 grade of muscles test to allow for improvement in functional activities such as performing chores.  2.Pt to improve range of motion by 25% to allow for improved functional mobility to allow for improvement in IADLs.   3.Pt to report compliance with HEP and demonstrate proper exercise technique to PT to show competence with self management of condition.  4.Decrease pain  by 25% during functional activities.     Long Term Goals (12 Weeks):      1. Increase ROM to allow improved joint biomechanics during functional activities.   2.Increase trunk and lower extremity strength to within normal limits during functional activities.   3. Independent with home exercise program.   4. Full return to functional activities with manageable complaints.  5. Patient to demonstrate improved posture and body mechanics.  6. Decrease pain by 75% during functional activities.    PLAN     Plan of care Certification: 7/8/2022 to 10/8/22.    Steve Joyce, PT

## 2022-08-11 ENCOUNTER — LAB VISIT (OUTPATIENT)
Dept: LAB | Facility: HOSPITAL | Age: 72
End: 2022-08-11
Attending: NURSE PRACTITIONER
Payer: MEDICARE

## 2022-08-11 DIAGNOSIS — N18.31 STAGE 3A CHRONIC KIDNEY DISEASE: ICD-10-CM

## 2022-08-11 DIAGNOSIS — E55.9 HYPOVITAMINOSIS D: ICD-10-CM

## 2022-08-11 LAB
25(OH)D3+25(OH)D2 SERPL-MCNC: 34 NG/ML (ref 30–96)
CREAT SERPL-MCNC: 1.5 MG/DL (ref 0.5–1.4)
EST. GFR  (NO RACE VARIABLE): 49 ML/MIN/1.73 M^2
ESTIMATED AVG GLUCOSE: 134 MG/DL (ref 68–131)
HBA1C MFR BLD: 6.3 % (ref 4–5.6)

## 2022-08-11 PROCEDURE — 82565 ASSAY OF CREATININE: CPT | Performed by: NURSE PRACTITIONER

## 2022-08-11 PROCEDURE — 82306 VITAMIN D 25 HYDROXY: CPT | Performed by: NURSE PRACTITIONER

## 2022-08-11 PROCEDURE — 83036 HEMOGLOBIN GLYCOSYLATED A1C: CPT | Performed by: NURSE PRACTITIONER

## 2022-08-11 PROCEDURE — 36415 COLL VENOUS BLD VENIPUNCTURE: CPT | Mod: PN | Performed by: NURSE PRACTITIONER

## 2022-08-12 ENCOUNTER — CLINICAL SUPPORT (OUTPATIENT)
Dept: REHABILITATION | Facility: HOSPITAL | Age: 72
End: 2022-08-12
Payer: MEDICARE

## 2022-08-12 DIAGNOSIS — M25.611 DECREASED RANGE OF MOTION OF RIGHT SHOULDER: Primary | ICD-10-CM

## 2022-08-12 DIAGNOSIS — R29.898 IMPAIRED STRENGTH OF SHOULDER MUSCLES: ICD-10-CM

## 2022-08-12 PROCEDURE — 97110 THERAPEUTIC EXERCISES: CPT | Mod: PN

## 2022-08-12 NOTE — PROGRESS NOTES
[facility]   Physical Therapy Progress Note     Name: Driss Hernandez Jr.  Clinic Number: [id number]    Therapy Diagnosis:   Encounter Diagnoses   Name Primary?    Decreased range of motion of right shoulder Yes    Impaired strength of shoulder muscles      Physician: Allison Hemphill PA-C    Visit Date: 8/12/2022       Physician Orders: PT Eval and Treat   Medical Diagnosis from Referral: M75.112 (ICD-10-CM) - Nontraumatic incomplete tear of left rotator cuff  Evaluation Date: 7/8/2022  Plan of Care Expiration: 12/31/2022    Authorization Period Expiration: 7/5/23  Visit # / Visits authorized: 6/ 20    Precautions: Standard, s/p Right partial thickness RTC repair with biceps tenodesis     POSTOPERATIVE PLAN: Plan to follow [facility] rehab protocol + biceps tenodesis. AROM of elbow ok, no resistive elbow exercises until 8     DOS: 7-5-22         POW: 5    Time In: 1215  Time Out: 1305    Total Billable Time: 40 minutes      SUBJECTIVE     Pt reports: soreness from sleeping today.      He was compliant with home exercise program.  Response to previous treatment: None  Functional change: No change     Pain: 4/10  Location: left shoulder       OBJECTIVE     Objective Measures updated at progress report unless specified.     TREATMENT     Driss received the treatments listed below:      received therapeutic exercises to develop strength and ROM for 35  minutes including:    Pulley x 4 min in scaption  4 way GH isometrics 2 x 10  scapular retraction 3 x 10  shrugs 3 x 10  elbow/flex/ext, gentle AAROM 1x15   upper trap stretch 30 sec x 5  codman's x 30  Table slides 2 x 10 incline  supine wand ER x 15  Scapular MRE's seated 2 x 10      received the following manual therapy techniques: Soft tissue Mobilization were applied to the: R shoulder for 10 minutes, including:  PROM GH,  Elbow flex/ext  GH oscillation     PATIENT EDUCATION AND HOME EXERCISES     Education provided:   - postural  awareness     Written Home Exercises Provided: yes.     Exercises were reviewed and Driss was able to demonstrate them prior to the end of the session.  Driss demonstrated good  understanding of the education provided.      See EMR under Patient Instructions for exercises provided 7/8/2022.    ASSESSMENT     Pt presents today compliant with immobilizer. Continued therex consisting of sub-maximal GH and elbow AAROM. Improved performance with postural awareness.  Good response to exercise progression. Driss Is progressing well towards his goals.     Pt prognosis is Good.     Pt will continue to benefit from skilled outpatient physical therapy to address the deficits listed in the problem list box on initial evaluation, provide pt/family education and to maximize pt's level of independence in the home and community environment.     Pt's spiritual, cultural and educational needs considered and pt agreeable to plan of care and goals.     Anticipated barriers to physical therapy: none    Short Term Goals (4 Weeks):  Updated  8/9/22 MET     1.Pt to increase strength by a 1/2 grade of muscles test to allow for improvement in functional activities such as performing chores.  2.Pt to improve range of motion by 25% to allow for improved functional mobility to allow for improvement in IADLs.   3.Pt to report compliance with HEP and demonstrate proper exercise technique to PT to show competence with self management of condition.  4.Decrease pain by 25% during functional activities.     Long Term Goals (12 Weeks):      1. Increase ROM to allow improved joint biomechanics during functional activities.   2.Increase trunk and lower extremity strength to within normal limits during functional activities.   3. Independent with home exercise program.   4. Full return to functional activities with manageable complaints.  5. Patient to demonstrate improved posture and body mechanics.  6. Decrease pain by 75% during functional  activities.    PLAN     Plan of care Certification: 7/8/2022 to 10/8/22.    Steve Joyce, PT

## 2022-08-18 ENCOUNTER — OFFICE VISIT (OUTPATIENT)
Dept: ORTHOPEDICS | Facility: CLINIC | Age: 72
End: 2022-08-18
Payer: MEDICARE

## 2022-08-18 ENCOUNTER — OFFICE VISIT (OUTPATIENT)
Dept: ENDOCRINOLOGY | Facility: CLINIC | Age: 72
End: 2022-08-18
Payer: MEDICARE

## 2022-08-18 VITALS
SYSTOLIC BLOOD PRESSURE: 154 MMHG | DIASTOLIC BLOOD PRESSURE: 76 MMHG | OXYGEN SATURATION: 99 % | BODY MASS INDEX: 28.87 KG/M2 | RESPIRATION RATE: 18 BRPM | WEIGHT: 184.31 LBS | HEART RATE: 45 BPM

## 2022-08-18 VITALS
BODY MASS INDEX: 28.87 KG/M2 | HEART RATE: 49 BPM | SYSTOLIC BLOOD PRESSURE: 130 MMHG | TEMPERATURE: 98 F | WEIGHT: 184.31 LBS | DIASTOLIC BLOOD PRESSURE: 70 MMHG

## 2022-08-18 DIAGNOSIS — Z79.4 CONTROLLED TYPE 2 DIABETES MELLITUS WITH COMPLICATION, WITH LONG-TERM CURRENT USE OF INSULIN: Primary | ICD-10-CM

## 2022-08-18 DIAGNOSIS — N18.31 STAGE 3A CHRONIC KIDNEY DISEASE: ICD-10-CM

## 2022-08-18 DIAGNOSIS — E11.8 CONTROLLED TYPE 2 DIABETES MELLITUS WITH COMPLICATION, WITH LONG-TERM CURRENT USE OF INSULIN: Primary | ICD-10-CM

## 2022-08-18 DIAGNOSIS — M75.112 NONTRAUMATIC INCOMPLETE TEAR OF LEFT ROTATOR CUFF: Primary | ICD-10-CM

## 2022-08-18 DIAGNOSIS — I25.10 CORONARY ARTERY DISEASE INVOLVING NATIVE CORONARY ARTERY OF NATIVE HEART WITHOUT ANGINA PECTORIS: ICD-10-CM

## 2022-08-18 PROCEDURE — 1159F PR MEDICATION LIST DOCUMENTED IN MEDICAL RECORD: ICD-10-PCS | Mod: CPTII,S$GLB,, | Performed by: ORTHOPAEDIC SURGERY

## 2022-08-18 PROCEDURE — 3008F BODY MASS INDEX DOCD: CPT | Mod: CPTII,S$GLB,, | Performed by: NURSE PRACTITIONER

## 2022-08-18 PROCEDURE — 99999 PR PBB SHADOW E&M-EST. PATIENT-LVL III: CPT | Mod: PBBFAC,,, | Performed by: NURSE PRACTITIONER

## 2022-08-18 PROCEDURE — 3077F SYST BP >= 140 MM HG: CPT | Mod: CPTII,S$GLB,, | Performed by: ORTHOPAEDIC SURGERY

## 2022-08-18 PROCEDURE — 3060F POS MICROALBUMINURIA REV: CPT | Mod: CPTII,S$GLB,, | Performed by: NURSE PRACTITIONER

## 2022-08-18 PROCEDURE — 3072F LOW RISK FOR RETINOPATHY: CPT | Mod: CPTII,S$GLB,, | Performed by: NURSE PRACTITIONER

## 2022-08-18 PROCEDURE — 3077F PR MOST RECENT SYSTOLIC BLOOD PRESSURE >= 140 MM HG: ICD-10-PCS | Mod: CPTII,S$GLB,, | Performed by: ORTHOPAEDIC SURGERY

## 2022-08-18 PROCEDURE — 3044F PR MOST RECENT HEMOGLOBIN A1C LEVEL <7.0%: ICD-10-PCS | Mod: CPTII,S$GLB,, | Performed by: NURSE PRACTITIONER

## 2022-08-18 PROCEDURE — 3008F PR BODY MASS INDEX (BMI) DOCUMENTED: ICD-10-PCS | Mod: CPTII,S$GLB,, | Performed by: ORTHOPAEDIC SURGERY

## 2022-08-18 PROCEDURE — 1160F RVW MEDS BY RX/DR IN RCRD: CPT | Mod: CPTII,S$GLB,, | Performed by: NURSE PRACTITIONER

## 2022-08-18 PROCEDURE — 3066F PR DOCUMENTATION OF TREATMENT FOR NEPHROPATHY: ICD-10-PCS | Mod: CPTII,S$GLB,, | Performed by: NURSE PRACTITIONER

## 2022-08-18 PROCEDURE — 3072F LOW RISK FOR RETINOPATHY: CPT | Mod: CPTII,S$GLB,, | Performed by: ORTHOPAEDIC SURGERY

## 2022-08-18 PROCEDURE — 3044F HG A1C LEVEL LT 7.0%: CPT | Mod: CPTII,S$GLB,, | Performed by: ORTHOPAEDIC SURGERY

## 2022-08-18 PROCEDURE — 3072F PR LOW RISK FOR RETINOPATHY: ICD-10-PCS | Mod: CPTII,S$GLB,, | Performed by: ORTHOPAEDIC SURGERY

## 2022-08-18 PROCEDURE — 1159F MED LIST DOCD IN RCRD: CPT | Mod: CPTII,S$GLB,, | Performed by: NURSE PRACTITIONER

## 2022-08-18 PROCEDURE — 3075F PR MOST RECENT SYSTOLIC BLOOD PRESS GE 130-139MM HG: ICD-10-PCS | Mod: CPTII,S$GLB,, | Performed by: NURSE PRACTITIONER

## 2022-08-18 PROCEDURE — 3060F POS MICROALBUMINURIA REV: CPT | Mod: CPTII,S$GLB,, | Performed by: ORTHOPAEDIC SURGERY

## 2022-08-18 PROCEDURE — 3066F NEPHROPATHY DOC TX: CPT | Mod: CPTII,S$GLB,, | Performed by: ORTHOPAEDIC SURGERY

## 2022-08-18 PROCEDURE — 3078F PR MOST RECENT DIASTOLIC BLOOD PRESSURE < 80 MM HG: ICD-10-PCS | Mod: CPTII,S$GLB,, | Performed by: ORTHOPAEDIC SURGERY

## 2022-08-18 PROCEDURE — 1126F PR PAIN SEVERITY QUANTIFIED, NO PAIN PRESENT: ICD-10-PCS | Mod: CPTII,S$GLB,, | Performed by: NURSE PRACTITIONER

## 2022-08-18 PROCEDURE — 99999 PR PBB SHADOW E&M-EST. PATIENT-LVL III: ICD-10-PCS | Mod: PBBFAC,,, | Performed by: NURSE PRACTITIONER

## 2022-08-18 PROCEDURE — 99999 PR PBB SHADOW E&M-EST. PATIENT-LVL V: ICD-10-PCS | Mod: PBBFAC,,, | Performed by: ORTHOPAEDIC SURGERY

## 2022-08-18 PROCEDURE — 3044F PR MOST RECENT HEMOGLOBIN A1C LEVEL <7.0%: ICD-10-PCS | Mod: CPTII,S$GLB,, | Performed by: ORTHOPAEDIC SURGERY

## 2022-08-18 PROCEDURE — 3078F PR MOST RECENT DIASTOLIC BLOOD PRESSURE < 80 MM HG: ICD-10-PCS | Mod: CPTII,S$GLB,, | Performed by: NURSE PRACTITIONER

## 2022-08-18 PROCEDURE — 99024 POSTOP FOLLOW-UP VISIT: CPT | Mod: S$GLB,,, | Performed by: ORTHOPAEDIC SURGERY

## 2022-08-18 PROCEDURE — 99024 PR POST-OP FOLLOW-UP VISIT: ICD-10-PCS | Mod: S$GLB,,, | Performed by: ORTHOPAEDIC SURGERY

## 2022-08-18 PROCEDURE — 3078F DIAST BP <80 MM HG: CPT | Mod: CPTII,S$GLB,, | Performed by: NURSE PRACTITIONER

## 2022-08-18 PROCEDURE — 3075F SYST BP GE 130 - 139MM HG: CPT | Mod: CPTII,S$GLB,, | Performed by: NURSE PRACTITIONER

## 2022-08-18 PROCEDURE — 3066F NEPHROPATHY DOC TX: CPT | Mod: CPTII,S$GLB,, | Performed by: NURSE PRACTITIONER

## 2022-08-18 PROCEDURE — 1125F AMNT PAIN NOTED PAIN PRSNT: CPT | Mod: CPTII,S$GLB,, | Performed by: ORTHOPAEDIC SURGERY

## 2022-08-18 PROCEDURE — 3072F PR LOW RISK FOR RETINOPATHY: ICD-10-PCS | Mod: CPTII,S$GLB,, | Performed by: NURSE PRACTITIONER

## 2022-08-18 PROCEDURE — 3066F PR DOCUMENTATION OF TREATMENT FOR NEPHROPATHY: ICD-10-PCS | Mod: CPTII,S$GLB,, | Performed by: ORTHOPAEDIC SURGERY

## 2022-08-18 PROCEDURE — 1126F AMNT PAIN NOTED NONE PRSNT: CPT | Mod: CPTII,S$GLB,, | Performed by: NURSE PRACTITIONER

## 2022-08-18 PROCEDURE — 3060F PR POS MICROALBUMINURIA RESULT DOCUMENTED/REVIEW: ICD-10-PCS | Mod: CPTII,S$GLB,, | Performed by: NURSE PRACTITIONER

## 2022-08-18 PROCEDURE — 1160F RVW MEDS BY RX/DR IN RCRD: CPT | Mod: CPTII,S$GLB,, | Performed by: ORTHOPAEDIC SURGERY

## 2022-08-18 PROCEDURE — 1125F PR PAIN SEVERITY QUANTIFIED, PAIN PRESENT: ICD-10-PCS | Mod: CPTII,S$GLB,, | Performed by: ORTHOPAEDIC SURGERY

## 2022-08-18 PROCEDURE — 3008F BODY MASS INDEX DOCD: CPT | Mod: CPTII,S$GLB,, | Performed by: ORTHOPAEDIC SURGERY

## 2022-08-18 PROCEDURE — 1160F PR REVIEW ALL MEDS BY PRESCRIBER/CLIN PHARMACIST DOCUMENTED: ICD-10-PCS | Mod: CPTII,S$GLB,, | Performed by: NURSE PRACTITIONER

## 2022-08-18 PROCEDURE — 99214 PR OFFICE/OUTPT VISIT, EST, LEVL IV, 30-39 MIN: ICD-10-PCS | Mod: S$GLB,,, | Performed by: NURSE PRACTITIONER

## 2022-08-18 PROCEDURE — 1159F PR MEDICATION LIST DOCUMENTED IN MEDICAL RECORD: ICD-10-PCS | Mod: CPTII,S$GLB,, | Performed by: NURSE PRACTITIONER

## 2022-08-18 PROCEDURE — 3078F DIAST BP <80 MM HG: CPT | Mod: CPTII,S$GLB,, | Performed by: ORTHOPAEDIC SURGERY

## 2022-08-18 PROCEDURE — 3008F PR BODY MASS INDEX (BMI) DOCUMENTED: ICD-10-PCS | Mod: CPTII,S$GLB,, | Performed by: NURSE PRACTITIONER

## 2022-08-18 PROCEDURE — 3060F PR POS MICROALBUMINURIA RESULT DOCUMENTED/REVIEW: ICD-10-PCS | Mod: CPTII,S$GLB,, | Performed by: ORTHOPAEDIC SURGERY

## 2022-08-18 PROCEDURE — 1160F PR REVIEW ALL MEDS BY PRESCRIBER/CLIN PHARMACIST DOCUMENTED: ICD-10-PCS | Mod: CPTII,S$GLB,, | Performed by: ORTHOPAEDIC SURGERY

## 2022-08-18 PROCEDURE — 1159F MED LIST DOCD IN RCRD: CPT | Mod: CPTII,S$GLB,, | Performed by: ORTHOPAEDIC SURGERY

## 2022-08-18 PROCEDURE — 3044F HG A1C LEVEL LT 7.0%: CPT | Mod: CPTII,S$GLB,, | Performed by: NURSE PRACTITIONER

## 2022-08-18 PROCEDURE — 99999 PR PBB SHADOW E&M-EST. PATIENT-LVL V: CPT | Mod: PBBFAC,,, | Performed by: ORTHOPAEDIC SURGERY

## 2022-08-18 PROCEDURE — 99214 OFFICE O/P EST MOD 30 MIN: CPT | Mod: S$GLB,,, | Performed by: NURSE PRACTITIONER

## 2022-08-18 NOTE — PATIENT INSTRUCTIONS
Recommend starting Farxiga as previously discussed for cardiovascular, kidney, and heart failure benefits. Call Pharmacy Assistance at [phone] to apply for Farxiga.  Their hours of operation are Monday through Friday, 8:00am to 4:30pm.     Reduce Tresiba from 24 to 20 units once daily.   Continue metformin, glimepiride, and Ozempic.   If patient is able to start Farxiga, will need to reduce Tresiba or glimepiride dose/stop.     Test glucose 2x/day.     Return to clinic in 3 months with labs prior.

## 2022-08-18 NOTE — PROGRESS NOTES
CC: This 71 y.o. White male  is here for evaluation of  T2DM along with comorbidities indicated in the Visit Diagnosis section of this encounter.    HPI: Driss Hernandez Jr. was diagnosed with T2DM in ~ 2005.   Pt was under care of Dr. Agrawal and TARI Werner NP, previously.   Medical history: CAD s/p CABG, atrial fibrillation, CHF       Prior visit 5/2022  a1c is down from 7.7 to 7.4%.   He is scheduled for shoulder sx in July.   Diet worsened over the last month d/t diet. Has been eating baked goods at home.   Plan Diabetes is fairly well controlled with A1c of 7.4%.   Recommending maintaining a healthy diet consistently, avoiding sweets.   Continue Tresiba at 24 units, metformin, glimepiride, and Ozempic.   Discussed starting SGLT2-inhibitor due to cardiovascular, heart failure, and renal benefits. Patient is interested if he can obtain through a financial assistance program.   Referral placed for Pharmacy Assistance to apply for Farxiga 5 mg daily. Potential side effects: genitourinary infections, dry mouth, and dizziness.   Return to clinic in 3 months with labs prior.       Interval history  a1c is down from 7.4 to 6.3%.   Reports average BG ~ 110. He reports he is not eating nearly as much as before.   He has lost 8 lb since last visit.     LAST DIABETES EDUCATION: never --     HOSPITALIZED FOR DIABETES  -  No.    DIABETES MEDICATION HX:   ozempic started 10/2020  Novolin R switched to glimepiride in Jan 2021     PRESCRIBED DIABETES MEDICATIONS:   Ozempic 1 mg once weekly  (obtains from Pharmacy Assistance)  Tresiba  24 units hs (obtains from Pharmacy Assistance)  metformin ER 1000 mg bid  glimepiride 2 mg once daily around 3 pm before snack     Misses medication doses - No      DM COMPLICATIONS: peripheral neuropathy and cardiovascular disease    SELF MONITORING BLOOD GLUCOSE: Checks blood glucose at home 2-3x/day. Forgot logs. He declines personal CGM d/t cost. He does not want to pay anything for his  testing supplies.     HYPOGLYCEMIC EPISODES: lowest BG was 65 around noon, probably didn't eat breakfast.     CURRENT DIET: drinks water, 2% milk ~ 2 cup/day. Eats 2-3 meals/day,  Tries to eat breakfast with his meds in the AM.   drinks 2 cups coffee with milk with AS in AM   Eats small portions.       CURRENT EXERCISE:  None     SOCIAL: his daughter is a palliative care NP      /70   Pulse (!) 49   Temp 98 °F (36.7 °C)   Wt 83.6 kg (184 lb 4.8 oz)   BMI 28.87 kg/m²       ROS:   CONSTITUTIONAL: Appetite good, denies fatigue  MS: + RIGHT SHOULDER PAIN s/p sx.       PHYSICAL EXAM:  GENERAL: Well developed, well nourished. No acute distress.   PSYCH: AAOx3, appropriate mood and affect, conversant, well-groomed. Judgement and insight good.   NEURO: Cranial nerves grossly intact. Speech clear, no tremor.   CHEST: Respirations even and unlabored.         Hemoglobin A1C   Date Value Ref Range Status   08/11/2022 6.3 (H) 4.0 - 5.6 % Final     Comment:     ADA Screening Guidelines:  5.7-6.4%  Consistent with prediabetes  >or=6.5%  Consistent with diabetes    High levels of fetal hemoglobin interfere with the HbA1C  assay. Heterozygous hemoglobin variants (HbS, HgC, etc)do  not significantly interfere with this assay.   However, presence of multiple variants may affect accuracy.     05/11/2022 7.4 (H) 4.0 - 5.6 % Final     Comment:     ADA Screening Guidelines:  5.7-6.4%  Consistent with prediabetes  >or=6.5%  Consistent with diabetes    High levels of fetal hemoglobin interfere with the HbA1C  assay. Heterozygous hemoglobin variants (HbS, HgC, etc)do  not significantly interfere with this assay.   However, presence of multiple variants may affect accuracy.     02/10/2022 7.7 (H) 4.0 - 5.6 % Final     Comment:     ADA Screening Guidelines:  5.7-6.4%  Consistent with prediabetes  >or=6.5%  Consistent with diabetes    High levels of fetal hemoglobin interfere with the HbA1C  assay. Heterozygous hemoglobin variants  (HbS, HgC, etc)do  not significantly interfere with this assay.   However, presence of multiple variants may affect accuracy.     08/29/2019 7.7 % Final     Comment:     Ania Werner           Chemistry        Component Value Date/Time     06/09/2022 0826    K 4.9 06/09/2022 0826     06/09/2022 0826    CO2 26 06/09/2022 0826    BUN 33 (H) 06/09/2022 0826    CREATININE 1.5 (H) 08/11/2022 0931     (H) 06/09/2022 0826        Component Value Date/Time    CALCIUM 10.2 06/09/2022 0826    ALKPHOS 28 (L) 06/09/2022 0826    AST 19 06/09/2022 0826    ALT 13 06/09/2022 0826    BILITOT 0.4 06/09/2022 0826    ESTGFRAFRICA 53.0 (A) 06/09/2022 0826    EGFRNONAA 45.9 (A) 06/09/2022 0826         Latest Reference Range & Units 08/11/22 09:31   Creatinine 0.5 - 1.4 mg/dL 1.5 (H)   eGFR >60 mL/min/1.73 m^2 49 !   (H): Data is abnormally high  !: Data is abnormal      Lab Results   Component Value Date    LDLCALC 77.8 05/11/2022          Latest Reference Range & Units 05/11/22 07:22   Cholesterol 120 - 199 mg/dL 123 [1]   HDL 40 - 75 mg/dL 22 (L) [2]   HDL/Cholesterol Ratio 20.0 - 50.0 % 17.9 (L)   LDL Cholesterol External 63.0 - 159.0 mg/dL 77.8 [3]   Non-HDL Cholesterol mg/dL 101 [4]   Total Cholesterol/HDL Ratio 2.0 - 5.0  5.6 (H)   Triglycerides 30 - 150 mg/dL 116 [5]     Lab Results   Component Value Date    MICALBCREAT 141.2 (H) 02/10/2022             ASSESSMENT and PLAN:    A1C GOAL: < 7.5 %       1. Controlled type 2 diabetes mellitus with complication, with long-term current use of insulin  Recommend starting Farxiga as previously discussed for cardiovascular, kidney, and heart failure benefits. Call Pharmacy Assistance at [phone] to apply for Farxiga.  Their hours of operation are Monday through Friday, 8:00am to 4:30pm.     Reduce Tresiba from 24 to 20 units once daily.   Continue metformin, glimepiride, and Ozempic.   If patient is able to start Farxiga, will need to reduce Tresiba or glimepiride  dose/stop.     Test glucose 2x/day.     Return to clinic in 3 months with labs prior.     Hemoglobin A1C   2. Coronary artery disease involving native coronary artery of native heart without angina pectoris  Improve glycemic control.      3. Stage 3a chronic kidney disease  Start Farxiga if approved      Orders Placed This Encounter   Procedures    Hemoglobin A1C     Standing Status:   Future     Standing Expiration Date:   10/17/2023        Follow up in about 3 months (around 11/18/2022).

## 2022-08-18 NOTE — PROGRESS NOTES
Postoperative Visit    History of Present Illness:   Driss is 6  weeks s/p right shoulder arthroscopy , rotator cuff repair and arthroscopic biceps tenodesis  (DOS-7/5/22)  He is doing well -- only has significant pain sometimes at night   Has been doing PT    Physical Examination:    Vitals:    08/18/22 0955   BP: (!) 154/76   Pulse: (!) 45   Resp: 18   SpO2: 99%   Weight: 83.6 kg (184 lb 4.9 oz)   PainSc:   8   PainLoc: Shoulder      NAD  right upper extremity:  Incisions over the shoulder healing well with suture in place   No erythema, drainage or other signs of infection. Appropriate post operative swelling is present  FE to 130  LTSI m/u/r  2+ RP  + EPL, IO, FDS, FDP     Assessment/Plan:  71 y.o. male 6 weeks s/p right shoulder arthroscopy , rotator cuff repair and arthroscopic biceps tenodesis  (DOS-7/5/22)    Plan  1. Ok to DC sling  2. Ok to start AROM, no strengthening until 12 weeks  3. PT - continue  4. Follow up in 6 weeks      All questions were answered in detail. The patient is in full agreement with the treatment plan and will proceed accordingly.

## 2022-08-19 ENCOUNTER — CLINICAL SUPPORT (OUTPATIENT)
Dept: REHABILITATION | Facility: HOSPITAL | Age: 72
End: 2022-08-19
Payer: MEDICARE

## 2022-08-19 DIAGNOSIS — M25.611 DECREASED RANGE OF MOTION OF RIGHT SHOULDER: Primary | ICD-10-CM

## 2022-08-19 DIAGNOSIS — R29.898 IMPAIRED STRENGTH OF SHOULDER MUSCLES: ICD-10-CM

## 2022-08-19 PROCEDURE — 97140 MANUAL THERAPY 1/> REGIONS: CPT | Mod: PN

## 2022-08-19 PROCEDURE — 97110 THERAPEUTIC EXERCISES: CPT | Mod: PN

## 2022-08-19 NOTE — PROGRESS NOTES
[facility]   Physical Therapy Daily Treatment Note     Name: Driss Hernandez Jr.  Clinic Number: [id number]    Therapy Diagnosis:   Encounter Diagnoses   Name Primary?    Decreased range of motion of right shoulder Yes    Impaired strength of shoulder muscles      Physician: Allison Hemphill PA-C    Visit Date: 8/19/2022       Physician Orders: PT Eval and Treat   Medical Diagnosis from Referral: M75.112 (ICD-10-CM) - Nontraumatic incomplete tear of left rotator cuff  Evaluation Date: 7/8/2022  Plan of Care Expiration: 12/31/2022    Authorization Period Expiration: 7/5/23  Visit # / Visits authorized: 6/ 20    Precautions: Standard, s/p Right partial thickness RTC repair with biceps tenodesis     POSTOPERATIVE PLAN: Plan to follow [facility] rehab protocol + biceps tenodesis. AROM of elbow ok, no resistive elbow exercises until 8     DOS: 7-5-22         POW: 5    Time In: 1215  Time Out: 1305    Total Billable Time: 40 minutes      SUBJECTIVE     Pt reports: returns from follow up with MD with instructions to continue therapy.  D/c'd immobilizer.      He was compliant with home exercise program.  Response to previous treatment: None  Functional change: No change     Pain: 4/10  Location: left shoulder       OBJECTIVE     Objective Measures updated at progress report unless specified.     TREATMENT     Driss received the treatments listed below:      received therapeutic exercises to develop strength and ROM for 35  minutes including:    Pulley x 4 min in scaption  ER/IR isometric walkouts YTB  scapular retraction 3 x 10  shrugs 3 x 10  elbow/flex/ext AAROM 1x15   upper trap stretch 30 sec x 5  codman's x 30  Supine wand flexion x 15  supine wand ER x 15  Supine wand press-up 2 x 10 2lbs  Scapular MRE's seated 2 x 10      received the following manual therapy techniques: Soft tissue Mobilization were applied to the: R shoulder for 10 minutes, including:  PROM GH,  Elbow flex/ext  GH  oscillation     PATIENT EDUCATION AND HOME EXERCISES     Education provided:   - postural awareness     Written Home Exercises Provided: yes.     Exercises were reviewed and Driss was able to demonstrate them prior to the end of the session.  Driss demonstrated good  understanding of the education provided.      See EMR under Patient Instructions for exercises provided 7/8/2022.    ASSESSMENT     Pt presents today d/c'd from immobilizer. Continued therex consisting of sub-maximal GH and elbow AAROM. Improved passive GH ROM in all planes.  Good response to exercise progression per protocol.  Driss Is progressing well towards his goals.     Pt prognosis is Good.     Pt will continue to benefit from skilled outpatient physical therapy to address the deficits listed in the problem list box on initial evaluation, provide pt/family education and to maximize pt's level of independence in the home and community environment.     Pt's spiritual, cultural and educational needs considered and pt agreeable to plan of care and goals.     Anticipated barriers to physical therapy: none    Short Term Goals (4 Weeks):  Updated  8/9/22 MET     1.Pt to increase strength by a 1/2 grade of muscles test to allow for improvement in functional activities such as performing chores.  2.Pt to improve range of motion by 25% to allow for improved functional mobility to allow for improvement in IADLs.   3.Pt to report compliance with HEP and demonstrate proper exercise technique to PT to show competence with self management of condition.  4.Decrease pain by 25% during functional activities.     Long Term Goals (12 Weeks):      1. Increase ROM to allow improved joint biomechanics during functional activities.   2.Increase trunk and lower extremity strength to within normal limits during functional activities.   3. Independent with home exercise program.   4. Full return to functional activities with manageable complaints.  5. Patient to demonstrate  improved posture and body mechanics.  6. Decrease pain by 75% during functional activities.    PLAN     Plan of care Certification: 7/8/2022 to 10/8/22.    Steve Joyce, PT

## 2022-08-23 ENCOUNTER — TELEPHONE (OUTPATIENT)
Dept: ENDOCRINOLOGY | Facility: CLINIC | Age: 72
End: 2022-08-23
Payer: MEDICARE

## 2022-08-23 NOTE — TELEPHONE ENCOUNTER
----- Message from Angie Burgos sent at 8/23/2022  1:48 PM CDT -----  Type: Patient Call Back    Who called:Self    What is the request in detail: Pt is currently taking dapagliflozin (FARXIGA) 5 mg Tab tablet    Can the clinic reply by [facility]? no    Would the patient rather a call back or a response via [facility]? Call back    Best call back number: [phone] (home)       Additional Information:

## 2022-08-24 ENCOUNTER — CLINICAL SUPPORT (OUTPATIENT)
Dept: REHABILITATION | Facility: HOSPITAL | Age: 72
End: 2022-08-24
Payer: MEDICARE

## 2022-08-24 DIAGNOSIS — M25.611 DECREASED RANGE OF MOTION OF RIGHT SHOULDER: Primary | ICD-10-CM

## 2022-08-24 DIAGNOSIS — R29.898 IMPAIRED STRENGTH OF SHOULDER MUSCLES: ICD-10-CM

## 2022-08-24 PROCEDURE — 97110 THERAPEUTIC EXERCISES: CPT | Mod: PN,CQ

## 2022-08-24 PROCEDURE — 97140 MANUAL THERAPY 1/> REGIONS: CPT | Mod: PN,CQ

## 2022-08-24 NOTE — PROGRESS NOTES
[facility]   Physical Therapy Daily Treatment Note     Name: Driss Hernandez Jr.  Clinic Number: [id number]    Therapy Diagnosis:   Encounter Diagnoses   Name Primary?    Decreased range of motion of right shoulder Yes    Impaired strength of shoulder muscles      Physician: Allison Hemphill PA-C    Visit Date: 8/24/2022       Physician Orders: PT Eval and Treat   Medical Diagnosis from Referral: M75.112 (ICD-10-CM) - Nontraumatic incomplete tear of left rotator cuff  Evaluation Date: 7/8/2022  Plan of Care Expiration: 12/31/2022    Authorization Period Expiration: 7/5/23  Visit # / Visits authorized: 7/ 20    Precautions: Standard, s/p Right partial thickness RTC repair with biceps tenodesis     POSTOPERATIVE PLAN: Plan to follow [facility] rehab protocol + biceps tenodesis. AROM of elbow ok, no resistive elbow exercises until 8     DOS: 7-5-22         POW: 7    Time In: 745  Time Out: 0835    Total Billable Time: 40 minutes      SUBJECTIVE     Pt reports: having a lot of pain at night. His shoulder has been hurting him more the past week, and he feels like he has been doing less the past week     He was compliant with home exercise program.  Response to previous treatment: None  Functional change: No change     Pain: 4/10  Location: left shoulder       OBJECTIVE     Objective Measures updated at progress report unless specified.     TREATMENT     Driss received the treatments listed below:      received therapeutic exercises to develop strength and ROM for 35  minutes including:    Pulley x 4 min in scaption  ER/IR isometric walkouts YTB 2x8 each way   scapular retraction 3 x 10  shrugs 3 x 10  elbow/flex/ext AAROM 1x15   upper trap stretch 30 sec x 5  codman's x 30  Supine wand flexion x 15  supine wand ER x 15  Supine wand press-up 2 x 10 2lbs  Scapular MRE's seated 2 x 10 or side lying       received the following manual therapy techniques: Soft tissue Mobilization were applied  to the: R shoulder for 10 minutes, including:  PROM GH,  Elbow flex/ext  GH oscillation     PATIENT EDUCATION AND HOME EXERCISES     Education provided:   - postural awareness     Written Home Exercises Provided: yes.     Exercises were reviewed and Driss was able to demonstrate them prior to the end of the session.  Driss demonstrated good  understanding of the education provided.      See EMR under Patient Instructions for exercises provided 7/8/2022.    ASSESSMENT     Pt presents today with no sling/brace. Pt reports more pain as of this week. Followed protocol per MD. ROM is good, but painful near end range. Increased pain with ER walkouts for first repetition, but improved as exercises progressed   Driss Is progressing well towards his goals.     Pt prognosis is Good.     Pt will continue to benefit from skilled outpatient physical therapy to address the deficits listed in the problem list box on initial evaluation, provide pt/family education and to maximize pt's level of independence in the home and community environment.     Pt's spiritual, cultural and educational needs considered and pt agreeable to plan of care and goals.     Anticipated barriers to physical therapy: none    Short Term Goals (4 Weeks):  Updated  8/9/22 MET     1.Pt to increase strength by a 1/2 grade of muscles test to allow for improvement in functional activities such as performing chores.  2.Pt to improve range of motion by 25% to allow for improved functional mobility to allow for improvement in IADLs.   3.Pt to report compliance with HEP and demonstrate proper exercise technique to PT to show competence with self management of condition.  4.Decrease pain by 25% during functional activities.     Long Term Goals (12 Weeks):      1. Increase ROM to allow improved joint biomechanics during functional activities.   2.Increase trunk and lower extremity strength to within normal limits during functional activities.   3. Independent with home  exercise program.   4. Full return to functional activities with manageable complaints.  5. Patient to demonstrate improved posture and body mechanics.  6. Decrease pain by 75% during functional activities.    PLAN     Plan of care Certification: 7/8/2022 to 10/8/22.    aPul De Anda, PTA

## 2022-08-26 ENCOUNTER — CLINICAL SUPPORT (OUTPATIENT)
Dept: REHABILITATION | Facility: HOSPITAL | Age: 72
End: 2022-08-26
Payer: MEDICARE

## 2022-08-26 DIAGNOSIS — M25.611 DECREASED RANGE OF MOTION OF RIGHT SHOULDER: Primary | ICD-10-CM

## 2022-08-26 DIAGNOSIS — R29.898 WEAKNESS OF SHOULDER: ICD-10-CM

## 2022-08-26 DIAGNOSIS — Z74.09 IMPAIRED MOBILITY AND ADLS: ICD-10-CM

## 2022-08-26 DIAGNOSIS — Z78.9 IMPAIRED MOBILITY AND ADLS: ICD-10-CM

## 2022-08-26 DIAGNOSIS — M25.511 CHRONIC RIGHT SHOULDER PAIN: ICD-10-CM

## 2022-08-26 DIAGNOSIS — G89.29 CHRONIC RIGHT SHOULDER PAIN: ICD-10-CM

## 2022-08-26 DIAGNOSIS — M95.8 WINGING OF SCAPULA: ICD-10-CM

## 2022-08-26 DIAGNOSIS — M75.112 NONTRAUMATIC INCOMPLETE TEAR OF LEFT ROTATOR CUFF: ICD-10-CM

## 2022-08-26 DIAGNOSIS — R29.898 IMPAIRED STRENGTH OF SHOULDER MUSCLES: ICD-10-CM

## 2022-08-26 PROCEDURE — 97140 MANUAL THERAPY 1/> REGIONS: CPT | Mod: PN

## 2022-08-26 PROCEDURE — 97110 THERAPEUTIC EXERCISES: CPT | Mod: PN

## 2022-08-26 NOTE — PROGRESS NOTES
[facility]   Physical Therapy Daily Treatment Note     Name: Driss Hernandez Jr.  Clinic Number: [id number]    Therapy Diagnosis:   No diagnosis found.  Physician: Allison Hemphill PA-C    Visit Date: 2022       Physician Orders: PT Eval and Treat   Medical Diagnosis from Referral: M75.112 (ICD-10-CM) - Nontraumatic incomplete tear of left rotator cuff  Evaluation Date: 2022  Plan of Care Expiration: 2022    Authorization Period Expiration: 23  Visit # / Visits authorized:     Precautions: Standard, s/p Right partial thickness RTC repair with biceps tenodesis     POSTOPERATIVE PLAN: Plan to follow [facility] rehab protocol + biceps tenodesis. AROM of elbow ok, no resistive elbow exercises until 8     DOS: 22         POW: 7    Time In: 0800am  Time Out: 0835am    Total Billable Time: 35 minutes      SUBJECTIVE      Pt reports: having a lot of pain in the early morning around 3:30-4am. Pt states he tries to stay active during the day and believes his neck is bothering him less since being released from wearing the sling last week. .  He was somewhat compliant with home exercise program.  Response to previous treatment: None  Functional change: No change     Pain: 3/10  Location: left shoulder       OBJECTIVE     Objective Measures updated at progress report unless specified.     TREATMENT     Driss received the treatments listed below:      received therapeutic exercises to develop strength and ROM for 30  minutes includinmin unattended by DPT; 10min attended (exercises preceeded by - were supervised by DPT)    - Pulley x 4 min in scaption  ER/IR isometric walkouts YTB 2x8 each way   - scapular retraction 3 x 10  - shrugs 3 x 10  - elbow/flex/ext AAROM 1x15   - upper trap stretch 30 sec x 5  - codman's x 30  Supine wand flexion x 15  supine wand ER x 15  Supine wand press-up 2 x 10 2lbs  Scapular MRE's seated 2 x 10 or side lying       received the  following manual therapy techniques: Soft tissue Mobilization were applied to the: R shoulder for 15 minutes, including:  PROM GHJ in supine - all planes; gr III GHJ posterior/inferior mobs,    GHJ oscillation; L sidelying scapular mobilizations into retraction/protraction and upward rotation    PATIENT EDUCATION AND HOME EXERCISES     Education provided:   - postural awareness  - reducing compensations from UT/LS     Written Home Exercises Provided: yes.     Exercises were reviewed and Driss was able to demonstrate them prior to the end of the session.  Driss demonstrated good  understanding of the education provided.      See EMR under Patient Instructions for exercises provided 7/8/2022.    ASSESSMENT   Pt tolerated treatment well today. Improvements in PROM into IR at 90deg abduction to 65deg and scaption to 120deg following posterior and inferior GHJ glides. Pt initially having difficulty decreasing guarding however as treatment continued pt was able to relax which reduced pain during PROM. Pt is progressing well towards goals.       Driss Is progressing well towards his goals.     Pt prognosis is Good.     Pt will continue to benefit from skilled outpatient physical therapy to address the deficits listed in the problem list box on initial evaluation, provide pt/family education and to maximize pt's level of independence in the home and community environment.     Pt's spiritual, cultural and educational needs considered and pt agreeable to plan of care and goals.     Anticipated barriers to physical therapy: none    Short Term Goals (4 Weeks):  Updated  8/9/22 MET     1.Pt to increase strength by a 1/2 grade of muscles test to allow for improvement in functional activities such as performing chores.  2.Pt to improve range of motion by 25% to allow for improved functional mobility to allow for improvement in IADLs.   3.Pt to report compliance with HEP and demonstrate proper exercise technique to PT to show  competence with self management of condition.  4.Decrease pain by 25% during functional activities.     Long Term Goals (12 Weeks):      1. Increase ROM to allow improved joint biomechanics during functional activities.   2.Increase trunk and lower extremity strength to within normal limits during functional activities.   3. Independent with home exercise program.   4. Full return to functional activities with manageable complaints.  5. Patient to demonstrate improved posture and body mechanics.  6. Decrease pain by 75% during functional activities.    PLAN     Plan of care Certification: 7/8/2022 to 10/8/22.    Venita Harper, PT, DPT, Cert DN

## 2022-08-31 ENCOUNTER — CLINICAL SUPPORT (OUTPATIENT)
Dept: REHABILITATION | Facility: HOSPITAL | Age: 72
End: 2022-08-31
Payer: MEDICARE

## 2022-08-31 DIAGNOSIS — R29.898 IMPAIRED STRENGTH OF SHOULDER MUSCLES: ICD-10-CM

## 2022-08-31 DIAGNOSIS — M25.611 DECREASED RANGE OF MOTION OF RIGHT SHOULDER: Primary | ICD-10-CM

## 2022-08-31 PROCEDURE — 97140 MANUAL THERAPY 1/> REGIONS: CPT | Mod: PN,CQ

## 2022-08-31 PROCEDURE — 97110 THERAPEUTIC EXERCISES: CPT | Mod: PN,CQ

## 2022-08-31 NOTE — PROGRESS NOTES
[facility]   Physical Therapy Daily Treatment Note     Name: Driss Hernandez Jr.  Clinic Number: [id number]    Therapy Diagnosis:   Encounter Diagnoses   Name Primary?    Decreased range of motion of right shoulder Yes    Impaired strength of shoulder muscles      Physician: Allison Hemphill PA-C    Visit Date: 2022       Physician Orders: PT Eval and Treat   Medical Diagnosis from Referral: M75.112 (ICD-10-CM) - Nontraumatic incomplete tear of left rotator cuff  Evaluation Date: 2022  Plan of Care Expiration: 2022    Authorization Period Expiration: 23  Visit # / Visits authorized:     Precautions: Standard, s/p Right partial thickness RTC repair with biceps tenodesis     POSTOPERATIVE PLAN: Plan to follow [facility] rehab protocol + biceps tenodesis. AROM of elbow ok, no resistive elbow exercises until 8     DOS: 22         POW: 7    Time In: 0830  Time Out: 915    Total Billable Time: 35 minutes      SUBJECTIVE      Pt reports: he is feeling woozy today. Thinks he took medication too late last night. Shoulder is still very bothersome at night, but now feeling ok   He was somewhat compliant with home exercise program.  Response to previous treatment: None  Functional change: No change     Pain: 3/10  Location: left shoulder       OBJECTIVE     Objective Measures updated at progress report unless specified.     TREATMENT     Driss received the treatments listed below:      received therapeutic exercises to develop strength and ROM for 35  minutes includinmin unattended by DPT; 10min attended (exercises preceeded by - were supervised by DPT)    - Pulley x 4 min in scaption  ER/IR isometric walkouts YTB 2x8 each way   - scapular retraction 3 x 10  - shrugs 3 x 10  - elbow/flex/ext AAROM 1x15   - upper trap stretch 30 sec x 5  - codman's x 30  Supine wand flexion x 15  supine wand ER x 15  Supine wand press-up 2 x 10 2lbs  Scapular MRE's seated 2 x 10 or  "side lying       received the following manual therapy techniques: Soft tissue Mobilization were applied to the: R shoulder for 10 minutes, including:  PROM GHJ in supine - all planes; gr III GHJ posterior/inferior mobs,    GHJ oscillation; L sidelying scapular mobilizations into retraction/protraction and upward rotation    PATIENT EDUCATION AND HOME EXERCISES     Education provided:   - postural awareness  - reducing compensations from UT/LS     Written Home Exercises Provided: yes.     Exercises were reviewed and Driss was able to demonstrate them prior to the end of the session.  Driss demonstrated good  understanding of the education provided.      See EMR under Patient Instructions for exercises provided 7/8/2022.    ASSESSMENT   Pt tolerated treatment well today. Pt presents feeling "woozy". Pt states he took his muscle relaxer medication around 9pm last night which was contributing to woozy feeling. Pt had increased dizziness coming from supine to sit after exercises and required to lay supine again. Slowly raised head of bed to acclimate. Improved pain with PROM and AAROM in supine.   Pt is progressing well towards goals.       Driss Is progressing well towards his goals.     Pt prognosis is Good.     Pt will continue to benefit from skilled outpatient physical therapy to address the deficits listed in the problem list box on initial evaluation, provide pt/family education and to maximize pt's level of independence in the home and community environment.     Pt's spiritual, cultural and educational needs considered and pt agreeable to plan of care and goals.     Anticipated barriers to physical therapy: none    Short Term Goals (4 Weeks):  Updated  8/9/22 MET     1.Pt to increase strength by a 1/2 grade of muscles test to allow for improvement in functional activities such as performing chores.  2.Pt to improve range of motion by 25% to allow for improved functional mobility to allow for improvement in IADLs. "   3.Pt to report compliance with HEP and demonstrate proper exercise technique to PT to show competence with self management of condition.  4.Decrease pain by 25% during functional activities.     Long Term Goals (12 Weeks):      1. Increase ROM to allow improved joint biomechanics during functional activities.   2.Increase trunk and lower extremity strength to within normal limits during functional activities.   3. Independent with home exercise program.   4. Full return to functional activities with manageable complaints.  5. Patient to demonstrate improved posture and body mechanics.  6. Decrease pain by 75% during functional activities.    PLAN     Plan of care Certification: 7/8/2022 to 10/8/22.    Paul De Anda, PTA

## 2022-09-02 ENCOUNTER — CLINICAL SUPPORT (OUTPATIENT)
Dept: REHABILITATION | Facility: HOSPITAL | Age: 72
End: 2022-09-02
Payer: MEDICARE

## 2022-09-02 DIAGNOSIS — R29.898 IMPAIRED STRENGTH OF SHOULDER MUSCLES: ICD-10-CM

## 2022-09-02 DIAGNOSIS — M25.611 DECREASED RANGE OF MOTION OF RIGHT SHOULDER: Primary | ICD-10-CM

## 2022-09-02 PROCEDURE — 97110 THERAPEUTIC EXERCISES: CPT | Mod: PN

## 2022-09-02 PROCEDURE — 97140 MANUAL THERAPY 1/> REGIONS: CPT | Mod: PN

## 2022-09-02 NOTE — PROGRESS NOTES
[facility]   Physical Therapy Daily Treatment Note     Name: Driss Hernandez Jr.  Clinic Number: [id number]    Therapy Diagnosis:   Encounter Diagnoses   Name Primary?    Decreased range of motion of right shoulder Yes    Impaired strength of shoulder muscles      Physician: Allison Hemphill PA-C    Visit Date: 9/2/2022       Physician Orders: PT Eval and Treat   Medical Diagnosis from Referral: M75.112 (ICD-10-CM) - Nontraumatic incomplete tear of left rotator cuff  Evaluation Date: 7/8/2022  Plan of Care Expiration: 12/31/2022    Authorization Period Expiration: 7/5/23  Visit # / Visits authorized: 8/ 20    Precautions: Standard, s/p Right partial thickness RTC repair with biceps tenodesis     POSTOPERATIVE PLAN: Plan to follow [facility] rehab protocol + biceps tenodesis. AROM of elbow ok, no resistive elbow exercises until 8     DOS: 7-5-22         POW: 8    Time In: 0830  Time Out: 0915    Total Billable Time: 45 minutes      SUBJECTIVE      Pt reports: he is feeling better today.   He was somewhat compliant with home exercise program.  Response to previous treatment: None  Functional change: improvements with ADL's.     Pain: 3/10  Location: left shoulder       OBJECTIVE     Objective Measures updated at progress report unless specified.     TREATMENT     Driss received the treatments listed below:      received therapeutic exercises to develop strength and ROM for 35  minutes including:     - Pulley x 6 min in scaption/ abd  ER/IR isometric walkouts RTB x 10 each way  ER/IR  - elbow/flex/ext AAROM 1x15   - upper trap stretch 30 sec x 5: HEP  - codman's x 30 w physioball  Supine wand flexion x 15  supine wand ER x 15  Supine wand press-up 2 x 10 2lbs  Scapular MRE's seated 2 x 10 or side lying   Sidelying shoulder flexion to 90deg 2 x 10      received the following manual therapy techniques: Soft tissue Mobilization were applied to the: R shoulder for 10 minutes,  including:    PROM GHJ in supine - all planes; gr III GHJ posterior/inferior mobs,    GHJ oscillation; L sidelying scapular mobilizations into retraction/protraction and upward rotation    PATIENT EDUCATION AND HOME EXERCISES     Education provided:   - postural awareness  - reducing compensations from UT/LS     Written Home Exercises Provided: yes.     Exercises were reviewed and Driss was able to demonstrate them prior to the end of the session.  Driss demonstrated good  understanding of the education provided.      See EMR under Patient Instructions for exercises provided 7/8/2022.    ASSESSMENT     Pt demonstrates improved performance with scapular positioning with prescribed therex. Continued therex consisting of sub-maximal GH and elbow AAROM. Improved passive GH ROM in all planes.  Good response to exercise progression per protocol.   Pt is progressing well towards goals.       Driss Is progressing well towards his goals.     Pt prognosis is Good.     Pt will continue to benefit from skilled outpatient physical therapy to address the deficits listed in the problem list box on initial evaluation, provide pt/family education and to maximize pt's level of independence in the home and community environment.     Pt's spiritual, cultural and educational needs considered and pt agreeable to plan of care and goals.     Anticipated barriers to physical therapy: none    Short Term Goals (4 Weeks):  Updated  8/9/22 MET     1.Pt to increase strength by a 1/2 grade of muscles test to allow for improvement in functional activities such as performing chores.  2.Pt to improve range of motion by 25% to allow for improved functional mobility to allow for improvement in IADLs.   3.Pt to report compliance with HEP and demonstrate proper exercise technique to PT to show competence with self management of condition.  4.Decrease pain by 25% during functional activities.     Long Term Goals (12 Weeks):      1. Increase ROM to allow  improved joint biomechanics during functional activities.   2.Increase trunk and lower extremity strength to within normal limits during functional activities.   3. Independent with home exercise program.   4. Full return to functional activities with manageable complaints.  5. Patient to demonstrate improved posture and body mechanics.  6. Decrease pain by 75% during functional activities.    PLAN     Plan of care Certification: 7/8/2022 to 10/8/22.    Steve Joyce, PT

## 2022-09-07 ENCOUNTER — CLINICAL SUPPORT (OUTPATIENT)
Dept: REHABILITATION | Facility: HOSPITAL | Age: 72
End: 2022-09-07
Payer: MEDICARE

## 2022-09-07 DIAGNOSIS — M25.611 DECREASED RANGE OF MOTION OF RIGHT SHOULDER: Primary | ICD-10-CM

## 2022-09-07 DIAGNOSIS — R29.898 IMPAIRED STRENGTH OF SHOULDER MUSCLES: ICD-10-CM

## 2022-09-07 PROCEDURE — 97110 THERAPEUTIC EXERCISES: CPT | Mod: PN

## 2022-09-07 PROCEDURE — 97140 MANUAL THERAPY 1/> REGIONS: CPT | Mod: PN

## 2022-09-07 NOTE — PROGRESS NOTES
[facility]   Physical Therapy Progress Note     Name: Driss Hernandez Jr.  Clinic Number: [id number]    Therapy Diagnosis:   Encounter Diagnoses   Name Primary?    Decreased range of motion of right shoulder Yes    Impaired strength of shoulder muscles      Physician: Allison Hemphill PA-C    Visit Date: 9/7/2022       Physician Orders: PT Eval and Treat   Medical Diagnosis from Referral: M75.112 (ICD-10-CM) - Nontraumatic incomplete tear of left rotator cuff  Evaluation Date: 7/8/2022  Plan of Care Expiration: 12/31/2022    Authorization Period Expiration: 7/5/23  Visit # / Visits authorized: 8/ 20    Precautions: Standard, s/p Right partial thickness RTC repair with biceps tenodesis     POSTOPERATIVE PLAN: Plan to follow [facility] rehab protocol + biceps tenodesis. AROM of elbow ok, no resistive elbow exercises until 8     DOS: 7-5-22         POW: 8-9    Time In: 0745  Time Out: 0840    Total Billable Time: 45 minutes      SUBJECTIVE      Pt reports: shoulder is improving with therapy. Reports continued moderate pain in the evening.   He was somewhat compliant with home exercise program.  Response to previous treatment: None  Functional change: improvements with ADL's.     Pain: 3/10  Location: right shoulder       OBJECTIVE     Objective Measures updated at progress report unless specified.      Shoulder Range of Motion:   ACTIVE ROM LEFT RIGHT   Flexion 150 110   Abduction 140 76   IR Functional T12 NT   ER Functional T2 NT      PASSIVE ROM LEFT RIGHT   Flexion    Abduction       STRENGTH LEFT RIGHT   Flexion 5/5 3+   Abduction 4/5 3+   Extension NT NT   IR at 45 4+/5 25   ER at 45 4-/5 75   Elbow Flexion 5/5 NT        TREATMENT     Driss received the treatments listed below:      received therapeutic exercises to develop strength and ROM for 35  minutes including:     - Pulley x 6 min in scaption/ abd  ER/IR isometric walkouts YTB x 10 each way  ER/IR/Flex  Wall  climbs x 10  - elbow/flex/ext AAROM 1x15 HEP  - upper trap stretch 30 sec x 5: HEP  - codman's x 30 w physioball  Supine wand flexion x 15  supine wand ER x 15  Supine wand press-up 2 x 10 2lbs  Scapular MRE's seated 2 x 10 or side lying   Sidelying shoulder flexion to 90deg 2 x 10      received the following manual therapy techniques: Soft tissue Mobilization were applied to the: R shoulder for 10 minutes, including:    PROM GHJ in supine - all planes; gr III GHJ posterior/inferior mobs,    GHJ oscillation; L sidelying scapular mobilizations into retraction/protraction and upward rotation    PATIENT EDUCATION AND HOME EXERCISES     Education provided:   - postural awareness  - reducing compensations from UT/LS     Written Home Exercises Provided: yes.     Exercises were reviewed and Driss was able to demonstrate them prior to the end of the session.  Driss demonstrated good  understanding of the education provided.      See EMR under Patient Instructions for exercises provided 7/8/2022.    ASSESSMENT     Continued therex consisting of sub-maximal GH and elbow AAROM.   Pt demonstrates improved functional GH ROM in all planes.  Active elevation limited to 110 deg. Good response to exercise progression per protocol.   Pt is progressing well towards goals.       Driss Is progressing well towards his goals.     Pt prognosis is Good.     Pt will continue to benefit from skilled outpatient physical therapy to address the deficits listed in the problem list box on initial evaluation, provide pt/family education and to maximize pt's level of independence in the home and community environment.     Pt's spiritual, cultural and educational needs considered and pt agreeable to plan of care and goals.     Anticipated barriers to physical therapy: none    Short Term Goals (8 Weeks):  Updated  9/7/22 Partially MET     1.Pt to increase strength by a 1/2 grade of muscles test to allow for improvement in functional activities such as  performing chores.  2.Pt to improve range of motion by 50% to allow for improved functional mobility to allow for improvement in IADLs.   3.Pt to report compliance with HEP and demonstrate proper exercise technique to PT to show competence with self management of condition.  4.Decrease pain by 50% during functional activities.     Long Term Goals (16 Weeks):   In progress     1. Increase ROM to allow improved joint biomechanics during functional activities.   2.Increase trunk and lower extremity strength to within normal limits during functional activities.   3. Independent with home exercise program.   4. Full return to functional activities with manageable complaints.  5. Patient to demonstrate improved posture and body mechanics.  6. Decrease pain by 75% during functional activities.    PLAN     Plan of care Certification: 7/8/2022 to 10/8/22.    Steve Joyce, PT

## 2022-09-09 ENCOUNTER — CLINICAL SUPPORT (OUTPATIENT)
Dept: REHABILITATION | Facility: HOSPITAL | Age: 72
End: 2022-09-09
Payer: MEDICARE

## 2022-09-09 DIAGNOSIS — M25.611 DECREASED RANGE OF MOTION OF RIGHT SHOULDER: Primary | ICD-10-CM

## 2022-09-09 DIAGNOSIS — R29.898 IMPAIRED STRENGTH OF SHOULDER MUSCLES: ICD-10-CM

## 2022-09-09 PROCEDURE — 97110 THERAPEUTIC EXERCISES: CPT | Mod: PN

## 2022-09-09 NOTE — PROGRESS NOTES
[facility]   Physical Therapy Progress Note     Name: Driss Hernandez Jr.  Clinic Number: [id number]    Therapy Diagnosis:   Encounter Diagnoses   Name Primary?    Decreased range of motion of right shoulder Yes    Impaired strength of shoulder muscles      Physician: Allison Hemphill PA-C    Visit Date: 9/9/2022       Physician Orders: PT Eval and Treat   Medical Diagnosis from Referral: M75.112 (ICD-10-CM) - Nontraumatic incomplete tear of left rotator cuff  Evaluation Date: 7/8/2022  Plan of Care Expiration: 12/31/2022    Authorization Period Expiration: 7/5/23  Visit # / Visits authorized: 8/ 20    Precautions: Standard, s/p Right partial thickness RTC repair with biceps tenodesis     POSTOPERATIVE PLAN: Plan to follow [facility] rehab protocol + biceps tenodesis. AROM of elbow ok, no resistive elbow exercises until 8     DOS: 7-5-22         POW: 8-9    Time In: 0830  Time Out: 0920    Total Billable Time: 45 minutes      SUBJECTIVE      Pt reports: shoulder is doing better with therapy.   He was somewhat compliant with home exercise program.  Response to previous treatment: None  Functional change: improvements with ADL's.     Pain: 3/10  Location: right shoulder       OBJECTIVE     Objective Measures updated at progress report unless specified.        TREATMENT     Driss received the treatments listed below:      received therapeutic exercises to develop strength and ROM for 35  minutes including:     UBE x 4 min  - Pulley x 6 min in scaption/ abd  ER/IR isometric walkouts YTB x 10 each way  ER/IR/Flex  Wall climbs x 10  -codman's x 30 w physioball  Supine wand flexion x 15  supine wand ER x 15  Supine wand press-up 2 x 10 2lbs  Scapular MRE's seated 2 x 10 or side lying   Sidelying shoulder flexion to 90deg 2 x 10  Bilateral shoulder ext/rows YTB 3 x 10      received the following manual therapy techniques: Soft tissue Mobilization were applied to the: R shoulder for 10 minutes,  including:    PROM GHJ in supine - all planes; gr III GHJ posterior/inferior mobs,    GHJ oscillation; L sidelying scapular mobilizations into retraction/protraction and upward rotation    PATIENT EDUCATION AND HOME EXERCISES     Education provided:   - postural awareness  - reducing compensations from UT/LS     Written Home Exercises Provided: yes.     Exercises were reviewed and Driss was able to demonstrate them prior to the end of the session.  Driss demonstrated good  understanding of the education provided.      See EMR under Patient Instructions for exercises provided 7/8/2022.    ASSESSMENT     Pt presents ambulating with guarded posture, decreased arm swing.  Progressed therex today consisting of sub-maximal stabilization and AAROM activities. Mild c/o increased discomfort with prescribed activities.  Good response to exercise progression per protocol.   Pt is progressing well towards goals.       Driss Is progressing well towards his goals.     Pt prognosis is Good.     Pt will continue to benefit from skilled outpatient physical therapy to address the deficits listed in the problem list box on initial evaluation, provide pt/family education and to maximize pt's level of independence in the home and community environment.     Pt's spiritual, cultural and educational needs considered and pt agreeable to plan of care and goals.     Anticipated barriers to physical therapy: none    Short Term Goals (8 Weeks):  Updated  9/7/22 Partially MET     1.Pt to increase strength by a 1/2 grade of muscles test to allow for improvement in functional activities such as performing chores.  2.Pt to improve range of motion by 50% to allow for improved functional mobility to allow for improvement in IADLs.   3.Pt to report compliance with HEP and demonstrate proper exercise technique to PT to show competence with self management of condition.  4.Decrease pain by 50% during functional activities.     Long Term Goals (16 Weeks):    In progress     1. Increase ROM to allow improved joint biomechanics during functional activities.   2.Increase trunk and lower extremity strength to within normal limits during functional activities.   3. Independent with home exercise program.   4. Full return to functional activities with manageable complaints.  5. Patient to demonstrate improved posture and body mechanics.  6. Decrease pain by 75% during functional activities.    PLAN     Plan of care Certification: 7/8/2022 to 10/8/22.    Steve Joyce, PT

## 2022-09-13 ENCOUNTER — HOSPITAL ENCOUNTER (OUTPATIENT)
Dept: CARDIOLOGY | Facility: HOSPITAL | Age: 72
Discharge: HOME | End: 2022-09-13
Attending: INTERNAL MEDICINE
Payer: MEDICARE

## 2022-09-13 DIAGNOSIS — I65.23 ATHEROSCLEROSIS OF BOTH CAROTID ARTERIES: ICD-10-CM

## 2022-09-13 LAB
LEFT CBA DIAS: 11 CM/S
LEFT CBA SYS: 64 CM/S
LEFT CCA DIST DIAS: 9 CM/S
LEFT CCA DIST SYS: 45 CM/S
LEFT CCA MID DIAS: 12 CM/S
LEFT CCA MID SYS: 62 CM/S
LEFT CCA PROX DIAS: 9 CM/S
LEFT CCA PROX SYS: 63 CM/S
LEFT ECA DIAS: 10 CM/S
LEFT ECA SYS: 121 CM/S
LEFT ICA DIST DIAS: 31 CM/S
LEFT ICA DIST SYS: 96 CM/S
LEFT ICA MID DIAS: 20 CM/S
LEFT ICA MID SYS: 268 CM/S
LEFT ICA PROX DIAS: 40 CM/S
LEFT ICA PROX SYS: 202 CM/S
LEFT VERTEBRAL DIAS: 18 CM/S
LEFT VERTEBRAL SYS: 93 CM/S
OHS CV CAROTID RIGHT ICA EDV HIGHEST: 36
OHS CV CAROTID ULTRASOUND LEFT ICA/CCA RATIO: 5.96
OHS CV CAROTID ULTRASOUND RIGHT ICA/CCA RATIO: 2.63
OHS CV PV CAROTID LEFT HIGHEST CCA: 63
OHS CV PV CAROTID LEFT HIGHEST ICA: 268
OHS CV PV CAROTID RIGHT HIGHEST CCA: 69
OHS CV PV CAROTID RIGHT HIGHEST ICA: 158
OHS CV US CAROTID LEFT HIGHEST EDV: 40
RIGHT CBA DIAS: 14 CM/S
RIGHT CBA SYS: 78 CM/S
RIGHT CCA DIST DIAS: 12 CM/S
RIGHT CCA DIST SYS: 60 CM/S
RIGHT CCA MID DIAS: 14 CM/S
RIGHT CCA MID SYS: 64 CM/S
RIGHT CCA PROX DIAS: 9 CM/S
RIGHT CCA PROX SYS: 69 CM/S
RIGHT ECA DIAS: 8 CM/S
RIGHT ECA SYS: 168 CM/S
RIGHT ICA DIST DIAS: 30 CM/S
RIGHT ICA DIST SYS: 121 CM/S
RIGHT ICA MID DIAS: 36 CM/S
RIGHT ICA MID SYS: 158 CM/S
RIGHT ICA PROX DIAS: 34 CM/S
RIGHT ICA PROX SYS: 152 CM/S
RIGHT VERTEBRAL DIAS: 17 CM/S
RIGHT VERTEBRAL SYS: 74 CM/S

## 2022-09-13 PROCEDURE — 93880 EXTRACRANIAL BILAT STUDY: CPT | Mod: 26,,, | Performed by: INTERNAL MEDICINE

## 2022-09-13 PROCEDURE — 93880 CV US DOPPLER CAROTID (CUPID ONLY): ICD-10-PCS | Mod: 26,,, | Performed by: INTERNAL MEDICINE

## 2022-09-13 PROCEDURE — 93880 EXTRACRANIAL BILAT STUDY: CPT

## 2022-09-16 ENCOUNTER — CLINICAL SUPPORT (OUTPATIENT)
Dept: REHABILITATION | Facility: HOSPITAL | Age: 72
End: 2022-09-16
Payer: MEDICARE

## 2022-09-16 DIAGNOSIS — R29.898 IMPAIRED STRENGTH OF SHOULDER MUSCLES: ICD-10-CM

## 2022-09-16 DIAGNOSIS — M25.611 DECREASED RANGE OF MOTION OF RIGHT SHOULDER: Primary | ICD-10-CM

## 2022-09-16 PROCEDURE — 97110 THERAPEUTIC EXERCISES: CPT | Mod: PN

## 2022-09-16 NOTE — PROGRESS NOTES
"[facility]   Physical Therapy Progress Note     Name: Driss Hernandez Jr.  Clinic Number: [id number]    Therapy Diagnosis:   Encounter Diagnoses   Name Primary?    Decreased range of motion of right shoulder Yes    Impaired strength of shoulder muscles      Physician: Allison Hemphill PA-C    Visit Date: 9/16/2022       Physician Orders: PT Eval and Treat   Medical Diagnosis from Referral: M75.112 (ICD-10-CM) - Nontraumatic incomplete tear of left rotator cuff  Evaluation Date: 7/8/2022  Plan of Care Expiration: 12/31/2022    Authorization Period Expiration: 7/5/23  Visit # / Visits authorized: 8/ 20    Precautions: Standard, s/p Right partial thickness RTC repair with biceps tenodesis     POSTOPERATIVE PLAN: Plan to follow [facility] rehab protocol + biceps tenodesis. AROM of elbow ok, no resistive elbow exercises until 8     DOS: 7-5-22         POW: 8-9    Time In: 0915  Time Out: 1005    Total Billable Time: 40 minutes      SUBJECTIVE      Pt reports: shoulder has been moderately sore at nights.  Reports fair compliance with avoiding aggravating activities. States "you have to live your life".   He was somewhat compliant with home exercise program.  Response to previous treatment: None  Functional change: improvements with ADL's.     Pain: 3/10  Location: right shoulder       OBJECTIVE     Objective Measures updated at progress report unless specified.        TREATMENT     Driss received the treatments listed below:      received therapeutic exercises to develop strength and ROM for 30  minutes including:     UBE x 4 min  - Pulley x 6 min in scaption/ abd  ER/IR isometric walkouts YTB x 10 each way  ER/IR/Flex  Wall climbs x 10  -codman's x 30 w physioball  Supine wand flexion x 15  supine wand ER x 15  Supine wand press-up 2 x 10 2lbs  Scapular MRE's seated 2 x 10 or side lying   Sidelying shoulder flexion to 90deg 2 x 10  Bilateral shoulder ext/rows YTB 3 x 10      received the " following manual therapy techniques: Soft tissue Mobilization were applied to the: R shoulder for 10 minutes, including:    CP post treatment x 10 right shoulder post treatment.     PROM GHJ in supine - all planes; gr III GHJ posterior/inferior mobs,    GHJ oscillation; L sidelying scapular mobilizations into retraction/protraction and upward rotation    PATIENT EDUCATION AND HOME EXERCISES     Education provided:   - postural awareness  - reducing compensations from UT/LS     Written Home Exercises Provided: yes.     Exercises were reviewed and Driss was able to demonstrate them prior to the end of the session.  Driss demonstrated good  understanding of the education provided.      See EMR under Patient Instructions for exercises provided 7/8/2022.    ASSESSMENT     Unable to progress therex today due increased aggravation and pain.   Modified therex today consisting of sub-maximal stabilization and AAROM activities. Mild c/o increased discomfort with prescribed activities.  Fair response to exercise progression per protocol.   Pt is progressing well towards goals.       Driss Is progressing well towards his goals.     Pt prognosis is Good.     Pt will continue to benefit from skilled outpatient physical therapy to address the deficits listed in the problem list box on initial evaluation, provide pt/family education and to maximize pt's level of independence in the home and community environment.     Pt's spiritual, cultural and educational needs considered and pt agreeable to plan of care and goals.     Anticipated barriers to physical therapy: none    Short Term Goals (8 Weeks):  Updated  9/7/22 Partially MET     1.Pt to increase strength by a 1/2 grade of muscles test to allow for improvement in functional activities such as performing chores.  2.Pt to improve range of motion by 50% to allow for improved functional mobility to allow for improvement in IADLs.   3.Pt to report compliance with HEP and demonstrate  proper exercise technique to PT to show competence with self management of condition.  4.Decrease pain by 50% during functional activities.     Long Term Goals (16 Weeks):   In progress     1. Increase ROM to allow improved joint biomechanics during functional activities.   2.Increase trunk and lower extremity strength to within normal limits during functional activities.   3. Independent with home exercise program.   4. Full return to functional activities with manageable complaints.  5. Patient to demonstrate improved posture and body mechanics.  6. Decrease pain by 75% during functional activities.    PLAN     Plan of care Certification: 7/8/2022 to 10/8/22.    Steve Joyce, PT

## 2022-09-20 ENCOUNTER — CLINICAL SUPPORT (OUTPATIENT)
Dept: REHABILITATION | Facility: HOSPITAL | Age: 72
End: 2022-09-20
Payer: MEDICARE

## 2022-09-20 DIAGNOSIS — R29.898 IMPAIRED STRENGTH OF SHOULDER MUSCLES: ICD-10-CM

## 2022-09-20 DIAGNOSIS — M25.611 DECREASED RANGE OF MOTION OF RIGHT SHOULDER: Primary | ICD-10-CM

## 2022-09-20 PROCEDURE — 97110 THERAPEUTIC EXERCISES: CPT | Mod: PN

## 2022-09-20 NOTE — PROGRESS NOTES
[facility]   Physical Therapy Progress Note     Name: Driss Hernandez Jr.  Clinic Number: [id number]    Therapy Diagnosis:   Encounter Diagnoses   Name Primary?    Decreased range of motion of right shoulder Yes    Impaired strength of shoulder muscles      Physician: Allison Hemphill PA-C    Visit Date: 9/20/2022       Physician Orders: PT Eval and Treat   Medical Diagnosis from Referral: M75.112 (ICD-10-CM) - Nontraumatic incomplete tear of left rotator cuff  Evaluation Date: 7/8/2022  Plan of Care Expiration: 12/31/2022    Authorization Period Expiration: 7/5/23  Visit # / Visits authorized: 8/ 20    Precautions: Standard, s/p Right partial thickness RTC repair with biceps tenodesis     POSTOPERATIVE PLAN: Plan to follow [facility] rehab protocol + biceps tenodesis. AROM of elbow ok, no resistive elbow exercises until 8     DOS: 7-5-22         POW: 8-9    Time In: 0915  Time Out: 1005    Total Billable Time: 40 minutes      SUBJECTIVE      Pt reports: shoulder is feeling much better today. States she was able to rest it over the weekend.   He was somewhat compliant with home exercise program.  Response to previous treatment: None  Functional change: improvements with ADL's.     Pain: 3/10  Location: right shoulder       OBJECTIVE     Objective Measures updated at progress report unless specified.        TREATMENT     Driss received the treatments listed below:      received therapeutic exercises to develop strength and ROM for 30  minutes including:     UBE x 4 min  - Pulley x 6 min in scaption/ abd  ER/IR isometric walkouts YTB x 10 each way  ER/IR/Flex  Wall slides with pillow case x 2 x 10  Wall climbs x 10  -codman's x 30 w physioball  Supine wand flexion x 15  supine wand ER x 15  Supine wand press-up 2 x 10 2lbs  Scapular MRE's seated 2 x 10 or side lying   Sidelying shoulder flexion to 90deg 2 x 10  Bilateral shoulder ext/rows YTB 3 x 10      received the following manual  therapy techniques: Soft tissue Mobilization were applied to the: R shoulder for 10 minutes, including:    CP post treatment x 10 right shoulder post treatment.     PROM GHJ in supine - all planes; gr III GHJ posterior/inferior mobs,    GHJ oscillation; L sidelying scapular mobilizations into retraction/protraction and upward rotation    PATIENT EDUCATION AND HOME EXERCISES     Education provided:   - postural awareness  - reducing compensations from UT/LS     Written Home Exercises Provided: yes.     Exercises were reviewed and Driss was able to demonstrate them prior to the end of the session.  Driss demonstrated good  understanding of the education provided.      See EMR under Patient Instructions for exercises provided 7/8/2022.    ASSESSMENT     Progressed therex today consisting of sub-maximal stabilization and AAROM activities. No c/o increased discomfort with prescribed activities.  Improved response to exercise progression per protocol.   Pt is progressing well towards goals.       Driss Is progressing well towards his goals.     Pt prognosis is Good.     Pt will continue to benefit from skilled outpatient physical therapy to address the deficits listed in the problem list box on initial evaluation, provide pt/family education and to maximize pt's level of independence in the home and community environment.     Pt's spiritual, cultural and educational needs considered and pt agreeable to plan of care and goals.     Anticipated barriers to physical therapy: none    Short Term Goals (8 Weeks):  Updated  9/7/22 Partially MET     1.Pt to increase strength by a 1/2 grade of muscles test to allow for improvement in functional activities such as performing chores.  2.Pt to improve range of motion by 50% to allow for improved functional mobility to allow for improvement in IADLs.   3.Pt to report compliance with HEP and demonstrate proper exercise technique to PT to show competence with self management of  condition.  4.Decrease pain by 50% during functional activities.     Long Term Goals (16 Weeks):   In progress     1. Increase ROM to allow improved joint biomechanics during functional activities.   2.Increase trunk and lower extremity strength to within normal limits during functional activities.   3. Independent with home exercise program.   4. Full return to functional activities with manageable complaints.  5. Patient to demonstrate improved posture and body mechanics.  6. Decrease pain by 75% during functional activities.    PLAN     Plan of care Certification: 7/8/2022 to 10/8/22.    Steve Joyce, PT

## 2022-09-22 ENCOUNTER — OFFICE VISIT (OUTPATIENT)
Dept: CARDIOLOGY | Facility: CLINIC | Age: 72
End: 2022-09-22
Payer: MEDICARE

## 2022-09-22 ENCOUNTER — TELEPHONE (OUTPATIENT)
Dept: CARDIOLOGY | Facility: CLINIC | Age: 72
End: 2022-09-22
Payer: MEDICARE

## 2022-09-22 VITALS
BODY MASS INDEX: 28.88 KG/M2 | OXYGEN SATURATION: 98 % | DIASTOLIC BLOOD PRESSURE: 49 MMHG | WEIGHT: 184 LBS | HEIGHT: 67 IN | HEART RATE: 59 BPM | RESPIRATION RATE: 18 BRPM | SYSTOLIC BLOOD PRESSURE: 140 MMHG

## 2022-09-22 DIAGNOSIS — E08.22 DIABETES MELLITUS DUE TO UNDERLYING CONDITION WITH STAGE 3A CHRONIC KIDNEY DISEASE, WITH LONG-TERM CURRENT USE OF INSULIN: ICD-10-CM

## 2022-09-22 DIAGNOSIS — Z79.4 DIABETES MELLITUS DUE TO UNDERLYING CONDITION WITH STAGE 3A CHRONIC KIDNEY DISEASE, WITH LONG-TERM CURRENT USE OF INSULIN: ICD-10-CM

## 2022-09-22 DIAGNOSIS — I50.32 CHRONIC HEART FAILURE WITH PRESERVED EJECTION FRACTION: ICD-10-CM

## 2022-09-22 DIAGNOSIS — Z95.1 HX OF CABG: ICD-10-CM

## 2022-09-22 DIAGNOSIS — Z98.61 HISTORY OF PERCUTANEOUS CORONARY INTERVENTION: ICD-10-CM

## 2022-09-22 DIAGNOSIS — Z79.01 LONG TERM (CURRENT) USE OF ANTICOAGULANTS: ICD-10-CM

## 2022-09-22 DIAGNOSIS — I73.9 PAD (PERIPHERAL ARTERY DISEASE): ICD-10-CM

## 2022-09-22 DIAGNOSIS — N18.31 STAGE 3A CHRONIC KIDNEY DISEASE: ICD-10-CM

## 2022-09-22 DIAGNOSIS — N18.31 DIABETES MELLITUS DUE TO UNDERLYING CONDITION WITH STAGE 3A CHRONIC KIDNEY DISEASE, WITH LONG-TERM CURRENT USE OF INSULIN: ICD-10-CM

## 2022-09-22 DIAGNOSIS — I48.11 LONGSTANDING PERSISTENT ATRIAL FIBRILLATION: ICD-10-CM

## 2022-09-22 DIAGNOSIS — E78.2 MIXED HYPERLIPIDEMIA: ICD-10-CM

## 2022-09-22 DIAGNOSIS — I70.0 AORTIC ATHEROSCLEROSIS: ICD-10-CM

## 2022-09-22 DIAGNOSIS — I25.810 CORONARY ARTERY DISEASE INVOLVING CORONARY BYPASS GRAFT OF NATIVE HEART WITHOUT ANGINA PECTORIS: ICD-10-CM

## 2022-09-22 DIAGNOSIS — I10 ESSENTIAL HYPERTENSION: Primary | ICD-10-CM

## 2022-09-22 DIAGNOSIS — I65.23 ATHEROSCLEROSIS OF BOTH CAROTID ARTERIES: ICD-10-CM

## 2022-09-22 DIAGNOSIS — R94.31 ABNORMAL EKG: ICD-10-CM

## 2022-09-22 PROCEDURE — 3288F FALL RISK ASSESSMENT DOCD: CPT | Mod: CPTII,S$GLB,, | Performed by: INTERNAL MEDICINE

## 2022-09-22 PROCEDURE — 99999 PR PBB SHADOW E&M-EST. PATIENT-LVL III: ICD-10-PCS | Mod: PBBFAC,,, | Performed by: INTERNAL MEDICINE

## 2022-09-22 PROCEDURE — 99999 PR PBB SHADOW E&M-EST. PATIENT-LVL III: CPT | Mod: PBBFAC,,, | Performed by: INTERNAL MEDICINE

## 2022-09-22 PROCEDURE — 3060F PR POS MICROALBUMINURIA RESULT DOCUMENTED/REVIEW: ICD-10-PCS | Mod: CPTII,S$GLB,, | Performed by: INTERNAL MEDICINE

## 2022-09-22 PROCEDURE — 1101F PR PT FALLS ASSESS DOC 0-1 FALLS W/OUT INJ PAST YR: ICD-10-PCS | Mod: CPTII,S$GLB,, | Performed by: INTERNAL MEDICINE

## 2022-09-22 PROCEDURE — 3044F PR MOST RECENT HEMOGLOBIN A1C LEVEL <7.0%: ICD-10-PCS | Mod: CPTII,S$GLB,, | Performed by: INTERNAL MEDICINE

## 2022-09-22 PROCEDURE — 1126F PR PAIN SEVERITY QUANTIFIED, NO PAIN PRESENT: ICD-10-PCS | Mod: CPTII,S$GLB,, | Performed by: INTERNAL MEDICINE

## 2022-09-22 PROCEDURE — 3077F PR MOST RECENT SYSTOLIC BLOOD PRESSURE >= 140 MM HG: ICD-10-PCS | Mod: CPTII,S$GLB,, | Performed by: INTERNAL MEDICINE

## 2022-09-22 PROCEDURE — 3060F POS MICROALBUMINURIA REV: CPT | Mod: CPTII,S$GLB,, | Performed by: INTERNAL MEDICINE

## 2022-09-22 PROCEDURE — 3072F LOW RISK FOR RETINOPATHY: CPT | Mod: CPTII,S$GLB,, | Performed by: INTERNAL MEDICINE

## 2022-09-22 PROCEDURE — 3078F DIAST BP <80 MM HG: CPT | Mod: CPTII,S$GLB,, | Performed by: INTERNAL MEDICINE

## 2022-09-22 PROCEDURE — 99214 PR OFFICE/OUTPT VISIT, EST, LEVL IV, 30-39 MIN: ICD-10-PCS | Mod: S$GLB,,, | Performed by: INTERNAL MEDICINE

## 2022-09-22 PROCEDURE — 3066F PR DOCUMENTATION OF TREATMENT FOR NEPHROPATHY: ICD-10-PCS | Mod: CPTII,S$GLB,, | Performed by: INTERNAL MEDICINE

## 2022-09-22 PROCEDURE — 99214 OFFICE O/P EST MOD 30 MIN: CPT | Mod: S$GLB,,, | Performed by: INTERNAL MEDICINE

## 2022-09-22 PROCEDURE — 3044F HG A1C LEVEL LT 7.0%: CPT | Mod: CPTII,S$GLB,, | Performed by: INTERNAL MEDICINE

## 2022-09-22 PROCEDURE — 3072F PR LOW RISK FOR RETINOPATHY: ICD-10-PCS | Mod: CPTII,S$GLB,, | Performed by: INTERNAL MEDICINE

## 2022-09-22 PROCEDURE — 1101F PT FALLS ASSESS-DOCD LE1/YR: CPT | Mod: CPTII,S$GLB,, | Performed by: INTERNAL MEDICINE

## 2022-09-22 PROCEDURE — 3288F PR FALLS RISK ASSESSMENT DOCUMENTED: ICD-10-PCS | Mod: CPTII,S$GLB,, | Performed by: INTERNAL MEDICINE

## 2022-09-22 PROCEDURE — 1159F MED LIST DOCD IN RCRD: CPT | Mod: CPTII,S$GLB,, | Performed by: INTERNAL MEDICINE

## 2022-09-22 PROCEDURE — 1160F RVW MEDS BY RX/DR IN RCRD: CPT | Mod: CPTII,S$GLB,, | Performed by: INTERNAL MEDICINE

## 2022-09-22 PROCEDURE — 1160F PR REVIEW ALL MEDS BY PRESCRIBER/CLIN PHARMACIST DOCUMENTED: ICD-10-PCS | Mod: CPTII,S$GLB,, | Performed by: INTERNAL MEDICINE

## 2022-09-22 PROCEDURE — 3077F SYST BP >= 140 MM HG: CPT | Mod: CPTII,S$GLB,, | Performed by: INTERNAL MEDICINE

## 2022-09-22 PROCEDURE — 93000 ELECTROCARDIOGRAM COMPLETE: CPT | Mod: S$GLB,,, | Performed by: INTERNAL MEDICINE

## 2022-09-22 PROCEDURE — 93000 EKG 12-LEAD: ICD-10-PCS | Mod: S$GLB,,, | Performed by: INTERNAL MEDICINE

## 2022-09-22 PROCEDURE — 3008F PR BODY MASS INDEX (BMI) DOCUMENTED: ICD-10-PCS | Mod: CPTII,S$GLB,, | Performed by: INTERNAL MEDICINE

## 2022-09-22 PROCEDURE — 1126F AMNT PAIN NOTED NONE PRSNT: CPT | Mod: CPTII,S$GLB,, | Performed by: INTERNAL MEDICINE

## 2022-09-22 PROCEDURE — 3078F PR MOST RECENT DIASTOLIC BLOOD PRESSURE < 80 MM HG: ICD-10-PCS | Mod: CPTII,S$GLB,, | Performed by: INTERNAL MEDICINE

## 2022-09-22 PROCEDURE — 3008F BODY MASS INDEX DOCD: CPT | Mod: CPTII,S$GLB,, | Performed by: INTERNAL MEDICINE

## 2022-09-22 PROCEDURE — 3066F NEPHROPATHY DOC TX: CPT | Mod: CPTII,S$GLB,, | Performed by: INTERNAL MEDICINE

## 2022-09-22 PROCEDURE — 1159F PR MEDICATION LIST DOCUMENTED IN MEDICAL RECORD: ICD-10-PCS | Mod: CPTII,S$GLB,, | Performed by: INTERNAL MEDICINE

## 2022-09-22 RX ORDER — LOSARTAN POTASSIUM 50 MG/1
50 TABLET ORAL DAILY
Qty: 90 TABLET | Refills: 3 | Status: SHIPPED | OUTPATIENT
Start: 2022-09-22 | End: 2022-10-28

## 2022-09-22 NOTE — TELEPHONE ENCOUNTER
----- Message from Fallon Gill sent at 9/22/2022 11:38 AM CDT -----  Type: RX Refill Request    Who Called: wife     Have you contacted your pharmacy: yes , pt is supposed to start the Rx today but was sent to mail order pharmacy, please resend to pharmacy listed below     Refill or New Rx:     RX Name and Strength: losartan (COZAAR) 50 MG tablet    How is the patient currently taking it? (ex. 1XDay):    Is this a 30 day or 90 day RX:    Preferred Pharmacy with phone number:          [id number] - [place] - [address] [facility]  [address] [address]  Phone: [phone] Fax: [phone]

## 2022-09-22 NOTE — PROGRESS NOTES
CARDIOVASCULAR PROGRESS NOTE    REASON FOR CONSULT:   Driss Hernandez Jr. is a 71 y.o. male who presents for follow up of CAD, HFpEF, Chr AF.    PCP: Ehrensing  Ortho: [facility]  HISTORY OF PRESENT ILLNESS:   The patient returns for follow-up.  In the interim since last office visit, he underwent right shoulder surgery.  He continues to have pain in that shoulder with limited mobility.  The patient tells me this is not unexpected.  He denies angina, dyspnea, palpitations, or syncope.  There has been no PND, orthopnea, or lower extremity edema.  He denies melena, hematuria, or claudicant symptoms.  He continues take his Eliquis and Plavix without any issues.  He does complain about the volume of his medications.  I reviewed his medication list, and the patient is not on an ARB, but is on hydralazine and Imdur.  It is not clear to me why we never started an ARB in the past or if he had issues with potassium or creatinine given his CKD 3A.    CARDIOVASCULAR HISTORY:   CAD 2016 CABG x3 (LIMA-LAD, SVG-D, SVG-PDA)   11/11/20 cath with diffuse native disease, patent SVG x2, LIMA-LAD atretic   12/10/20: PCI OM2 2.25x26 [facility] [place]    HFpEF    PAD    Chr AF on eliquis 5mg bid    PAST MEDICAL HISTORY:     Past Medical History:   Diagnosis Date    Chronic midline low back pain without sciatica 10/2/2017    Colon polyps     Coronary artery disease involving native coronary artery of native heart 3/5/2020    Coronary artery disease involving native coronary artery of native heart with angina pectoris 12/10/2020    Diabetes mellitus with neurological manifestations, uncontrolled 1/24/2017    Diabetic polyneuropathy associated with type 2 diabetes mellitus 1/24/2017    Diabetic polyneuropathy associated with type 2 diabetes mellitus     Essential hypertension 1/24/2017    Gastroesophageal reflux disease 1/24/2017    Hyperlipidemia 1/24/2017    Insomnia 1/24/2017    Long-term insulin use 1/24/2017    Longstanding persistent  atrial fibrillation 12/30/2020    Lumbar spondylosis 11/13/2017    Nuclear sclerosis of both eyes 8/24/2017    Obesity     PAD (peripheral artery disease) 3/23/2022    Stage 3 chronic kidney disease 2/13/2019    Uncontrolled type 2 diabetes mellitus without complication, with long-term current use of insulin 1/24/2017       PAST SURGICAL HISTORY:     Past Surgical History:   Procedure Laterality Date    ARTHROSCOPIC REPAIR OF ROTATOR CUFF OF SHOULDER Right 7/5/2022    Procedure: REPAIR, ROTATOR CUFF, ARTHROSCOPIC;  Surgeon: Valerie Olivera MD;  Location: [facility] OR;  Service: Orthopedics;  Laterality: Right;  GUADALUPE JANEE NEPHJENNIFER LEMUS [phone]/ TEXTED ALLAN ON 6/23/2022 @ 9:47AM. ALLAN RESPONDED ON 6/23/2022 @ 10:33AM-LO  JEAN PAUL BABIN [phone] MY BE HERE FOR CASE SPOKE TO HIM @ 9:59AM ON 7-1-2022  RN PREOP 6/28/2022    BACK SURGERY      2002    CATARACT EXTRACTION W/  INTRAOCULAR LENS IMPLANT Right 08/29/2017    Dr. Hong    CATARACT EXTRACTION W/  INTRAOCULAR LENS IMPLANT Left 02/24/2022    Dr. Hong    COLONOSCOPY N/A 2/21/2017    Procedure: COLONOSCOPY;  Surgeon: Robb Rosado MD;  Location: [facility] ENDO;  Service: Endoscopy;  Laterality: N/A;    CORONARY ANGIOGRAPHY INCLUDING BYPASS GRAFTS WITH CATHETERIZATION OF LEFT HEART N/A 11/11/2020    Procedure: ANGIOGRAM, CORONARY, INCLUDING BYPASS GRAFT, WITH LEFT HEART CATHETERIZATION;  Surgeon: Lane Cuellar MD;  Location: [facility] CATH LAB;  Service: Cardiology;  Laterality: N/A;  RN PRE OP 11-5-2020. --COVID NEGATIVE ON  11-. C A    CORONARY ARTERY BYPASS GRAFT      March 2016    EPIDURAL STEROID INJECTION Bilateral 11/14/2018    Procedure: Lumbar Medial Branch Blocks;  Surgeon: Eze Byrd Jr., MD;  Location: [facility] ENDO;  Service: Pain Management;  Laterality: Bilateral;  Bilateral Lumbar Medial Branch Blocks L2, L3, L4, L5    [id number]  [id number]    Arrive @ 1150; NO Sedation    EPIDURAL STEROID INJECTION Bilateral 11/28/2018    Procedure:  Injection, Steroid, Epidural;  Surgeon: Eze Byrd Jr., MD;  Location: [facility] ENDO;  Service: Pain Management;  Laterality: Bilateral;  Bilateral Sacroiliac Joint Steroid Injections    31390    Arrive @ 0910    EPIDURAL STEROID INJECTION Bilateral 3/20/2019    Procedure: Injection, Steroid, Sacroiliiac Joint;  Surgeon: Eze Byrd Jr., MD;  Location: [facility] ENDO;  Service: Pain Management;  Laterality: Bilateral;  Bilateral Sacroiliac Joint Steroid Injections    06596    Arrive @ 1145; Trigger point injections also?    INTRAOCULAR PROSTHESES INSERTION Left 2/24/2022    Procedure: INSERTION, IOL PROSTHESIS;  Surgeon: Nickolas Hong MD;  Location: [facility] OR;  Service: Ophthalmology;  Laterality: Left;    PHACOEMULSIFICATION OF CATARACT Left 2/24/2022    Procedure: PHACOEMULSIFICATION, CATARACT;  Surgeon: Nickolas Hong MD;  Location: [facility] OR;  Service: Ophthalmology;  Laterality: Left;  RN Phone Pre Op 2-16-22.  Covid NEGATIVE  2-23-22.  Arrival 08:00 am.    TONSILLECTOMY         ALLERGIES AND MEDICATION:     Review of patient's allergies indicates:   Allergen Reactions    Penicillins Other (See Comments)     Unknown reaction, Had a reaction as a child    Shrimp Itching     Hand itching        Medication List            Accurate as of September 22, 2022  9:18 AM. If you have any questions, ask your nurse or doctor.                CHANGE how you take these medications      isosorbide mononitrate 120 MG 24 hr tablet  Commonly known as: IMDUR  Take 2 tablets (240 mg total) by mouth once daily.  What changed: Another medication with the same name was removed. Continue taking this medication, and follow the directions you see here.  Changed by: Eze Purdy MD            CONTINUE taking these medications      ACCU-CHEK MOIZ CONTROL SOLN Soln  Generic drug: blood glucose control high,low     ACCU-CHEK MOIZ PLUS TEST STRP Strp  Generic drug: blood sugar diagnostic  TEST THREE TIMES DAILY    "  ACCU-CHEK SOFTCLIX LANCETS Misc  Generic drug: lancets     albuterol 90 mcg/actuation inhaler  Commonly known as: PROVENTIL/VENTOLIN HFA  Inhale 2 puffs into the lungs every 4 (four) hours as needed for Shortness of Breath. Rescue     ascorbic acid (vitamin C) 100 MG tablet  Commonly known as: VITAMIN C     atorvastatin 80 MG tablet  Commonly known as: LIPITOR  Take 1 tablet (80 mg total) by mouth every evening.     b complex vitamins tablet     BD ALCOHOL SWABS Padm  Generic drug: alcohol swabs     BD ULTRA-FINE ROLAND PEN NEEDLE 32 gauge x 5/32" Ndle  Generic drug: pen needle, diabetic     celecoxib 100 MG capsule  Commonly known as: CeleBREX  Take 1 capsule (100 mg total) by mouth once daily.     clopidogreL 75 mg tablet  Commonly known as: PLAVIX  Take 1 tablet (75 mg total) by mouth once daily.     coenzyme Q10 100 mg capsule     dapagliflozin 5 mg Tab tablet  Commonly known as: FARXIGA  Take 1 tablet (5 mg total) by mouth once daily.     ELIQUIS 5 mg Tab  Generic drug: apixaban  TAKE 1 TABLET TWICE DAILY     ergocalciferol 50,000 unit Cap  Commonly known as: VITAMIN D2  TAKE ONE CAPSULE BY MOUTH WEEKLY     fenofibrate 160 MG Tab  TAKE 1 TABLET EVERY MORNING     furosemide 20 MG tablet  Commonly known as: LASIX  Take 1 tablet (20 mg total) by mouth 2 (two) times daily.     gabapentin 100 MG capsule  Commonly known as: NEURONTIN  TAKE 2 CAPSULES EVERY MORNING AND TAKE 3 CAPSULES EVERY DAY WITH DINNER     glimepiride 2 MG tablet  Commonly known as: AMARYL  TAKE 1 TABLET EVERY DAY BEFORE BREAKFAST     hydrALAZINE 50 MG tablet  Commonly known as: APRESOLINE  Take 1 tablet (50 mg total) by mouth 2 (two) times a day.     magnesium 250 mg Tab     metFORMIN 500 MG ER 24hr tablet  Commonly known as: GLUCOPHAGE-XR  Take 2 tablets twice daily with food     nitroGLYCERIN 0.4 MG SL tablet  Commonly known as: NITROSTAT  Place 1 tablet (0.4 mg total) under the tongue every 5 (five) minutes as needed for Chest pain.   "   omega-3 acid ethyl esters 1 gram capsule  Commonly known as: LOVAZA  Take 2 capsules (2 g total) by mouth 2 (two) times daily.     ondansetron 4 MG Tbdl  Commonly known as: ZOFRAN-ODT  Dissolve 1 tablet (4 mg total) by mouth every 6 (six) hours as needed (nausea).     oxyCODONE 5 MG immediate release tablet  Commonly known as: ROXICODONE  Take 1 tablet (5 mg total) by mouth every 6 (six) hours as needed for Pain.     pantoprazole 40 MG tablet  Commonly known as: PROTONIX  TAKE 1 TABLET EVERY DAY     tiZANidine 4 MG tablet  Commonly known as: ZANAFLEX     triazolam 0.25 MG Tab  Commonly known as: HALCION  TK 1 T PO QHS, prn              SOCIAL HISTORY:     Social History     Socioeconomic History    Marital status:    Tobacco Use    Smoking status: Never    Smokeless tobacco: Never   Substance and Sexual Activity    Alcohol use: Yes     Comment: rare occassion    Drug use: No    Sexual activity: Yes     Partners: Female     Social Determinants of Health     Financial Resource Strain: Low Risk     Difficulty of Paying Living Expenses: Not very hard   Food Insecurity: No Food Insecurity    Worried About Running Out of Food in the Last Year: Never true    Ran Out of Food in the Last Year: Never true   Transportation Needs: No Transportation Needs    Lack of Transportation (Medical): No    Lack of Transportation (Non-Medical): No   Physical Activity: Unknown    Days of Exercise per Week: Patient refused    Minutes of Exercise per Session: 60 min   Stress: Unknown    Feeling of Stress : Patient refused   Social Connections: Unknown    Frequency of Communication with Friends and Family: More than three times a week    Frequency of Social Gatherings with Friends and Family: More than three times a week    Active Member of Clubs or Organizations: Yes    Attends Club or Organization Meetings: More than 4 times per year    Marital Status:    Housing Stability: Low Risk     Unable to Pay for Housing in the Last  Year: No    Number of Places Lived in the Last Year: 1    Unstable Housing in the Last Year: No       FAMILY HISTORY:     Family History   Problem Relation Age of Onset    Depression Mother     Heart disease Mother     Stroke Father     No Known Problems Sister     Diabetes Sister     No Known Problems Sister     Blindness Brother     No Known Problems Maternal Aunt     No Known Problems Maternal Uncle     No Known Problems Paternal Aunt     No Known Problems Paternal Uncle     No Known Problems Maternal Grandmother     No Known Problems Maternal Grandfather     No Known Problems Paternal Grandmother     No Known Problems Paternal Grandfather     Amblyopia Neg Hx     Cancer Neg Hx     Cataracts Neg Hx     Glaucoma Neg Hx     Hypertension Neg Hx     Macular degeneration Neg Hx     Retinal detachment Neg Hx     Strabismus Neg Hx     Thyroid disease Neg Hx        REVIEW OF SYSTEMS:   Review of Systems   Constitutional:  Negative for chills, diaphoresis and fever.   HENT:  Negative for nosebleeds.    Eyes:  Negative for blurred vision, double vision and photophobia.   Respiratory:  Negative for cough, hemoptysis, shortness of breath and wheezing.    Cardiovascular:  Negative for chest pain, palpitations, orthopnea, claudication, leg swelling and PND.   Gastrointestinal:  Negative for abdominal pain, blood in stool, heartburn, melena, nausea and vomiting.   Genitourinary:  Negative for flank pain and hematuria.   Musculoskeletal:  Positive for joint pain (R shoulder). Negative for falls, myalgias and neck pain.   Skin:  Negative for rash.   Neurological:  Negative for dizziness, seizures, loss of consciousness, weakness and headaches.   Endo/Heme/Allergies:  Negative for polydipsia. Does not bruise/bleed easily.   Psychiatric/Behavioral:  Negative for depression and memory loss. The patient is not nervous/anxious.      PHYSICAL EXAM:     Vitals:    09/22/22 0904   BP: (!) 140/49   Pulse: (!) 59   Resp: 18    Body mass  "index is 28.81 kg/m².  Weight: 83.4 kg (183 lb 15.6 oz)   Height: 5' 7" (170.2 cm)     Physical Exam  Vitals reviewed.   Constitutional:       General: He is not in acute distress.     Appearance: He is well-developed. He is not ill-appearing, toxic-appearing or diaphoretic.   HENT:      Head: Normocephalic and atraumatic.   Eyes:      General: No scleral icterus.     Extraocular Movements: Extraocular movements intact.      Conjunctiva/sclera: Conjunctivae normal.      Pupils: Pupils are equal, round, and reactive to light.   Neck:      Thyroid: No thyromegaly.      Vascular: Normal carotid pulses. No carotid bruit or JVD.      Trachea: Trachea normal.   Cardiovascular:      Rate and Rhythm: Bradycardia present. Rhythm irregularly irregular.      Heart sounds: S1 normal and S2 normal. Heart sounds are distant. No murmur heard.    No friction rub. No gallop.   Pulmonary:      Effort: Pulmonary effort is normal. No respiratory distress.      Breath sounds: Normal breath sounds. No stridor. No wheezing, rhonchi or rales.   Chest:      Chest wall: No tenderness.   Abdominal:      General: There is no distension.      Palpations: Abdomen is soft.   Musculoskeletal:         General: No swelling or tenderness.      Cervical back: Normal range of motion and neck supple. No edema or rigidity.      Right lower leg: No edema.      Left lower leg: No edema.      Comments: R shoulder limited ROM   Feet:      Right foot:      Skin integrity: No ulcer.      Left foot:      Skin integrity: No ulcer.   Skin:     General: Skin is warm and dry.      Coloration: Skin is not jaundiced.   Neurological:      General: No focal deficit present.      Mental Status: He is alert and oriented to person, place, and time.      Cranial Nerves: No cranial nerve deficit.   Psychiatric:         Mood and Affect: Mood normal.         Speech: Speech normal.         Behavior: Behavior normal. Behavior is cooperative.       DATA:   EKG: (personally " reviewed tracing(s))  9/22/22 AF 54, NSSTTW changes, similar to 3/23/22    Laboratory:  CBC:  Recent Labs   Lab 12/07/20  1200 03/30/22  0908 06/09/22  0826   WBC 5.82 4.72 5.56   Hemoglobin 10.6 L 9.5 L 10.8 L   Hematocrit 33.8 L 32.0 L 36.7 L   Platelets 231 188 319         CHEMISTRIES:  Recent Labs   Lab 04/28/22  0923 05/11/22  0722 06/09/22  0826 08/11/22  0931 09/13/22  0658   Glucose 146 H  --  126 H  --  99   Sodium 141  --  143  --  140   Potassium 4.9  --  4.9  --  4.5   BUN 30 H  --  33 H  --  26 H   Creatinine 1.5 H 1.5 H 1.5 H 1.5 H 1.5 H   eGFR if  53 A 53.0 A 53.0 A  --   --    eGFR if non  46 A 45.9 A 45.9 A  --   --    Calcium 9.7  --  10.2  --  9.9         CARDIAC BIOMARKERS:        COAGS:  Recent Labs   Lab 04/01/21  0033 04/21/21  0921 05/10/21  0945   INR 2.4 H 2.0 H 2.9 H         LIPIDS/LFTS:  Recent Labs   Lab 08/18/20  0928 02/10/22  1014 05/11/22  0722 06/09/22  0826 09/13/22  0658   Cholesterol 137 163 123  --  133   Triglycerides 125 123 116  --  144   HDL 25 L 27 L 22 L  --  25 L   LDL Cholesterol 87.0 111.4 77.8  --  79.2   Non-HDL Cholesterol 112 136 101  --  108   AST 18  --   --  19 18   ALT 18  --   --  13 12         Cardiovascular Testing:  Carotid US 9/13/22  There is 40-49% right Internal Carotid Stenosis.  There is 60-69% left Internal Carotid Stenosis.  >50% R ECA stenosis.  Similar ICA findings noted on report 3/30/22.    LE venous US 4/20/22  There is no evidence of a right lower extremity DVT.  There is no evidence of a left lower extremity DVT.  1.2x0.6cm non vascular structure noted in left proximal calf.    Echo 3/30/22  The left ventricle is normal in size with mild concentric hypertrophy and normal systolic function.  The estimated ejection fraction is 60%.  Mild right ventricular enlargement with low normal right ventricular systolic function.  Mild mitral regurgitation.  Mild tricuspid regurgitation.  The estimated PA systolic pressure  is 22 mmHg.  Atrial fibrillation observed.    Holter 5/19/21  Atrial fibrillation. Heart rates varied between 31 and 112 bpm with an average of 57 bpm.  There were rare PVCs totalling 100 and averaging 4.17 per hour.  Longest RR interval 3.3 seconds    L MPI 5/19/21    Abnormal myocardial perfusion scan.    There is a mild intensity, fixed defect consistent with scar in the anterior wall.    There is a second moderate intensity, reversible defect that is consistent with ischemia in the inferior wall.    The gated perfusion images showed an ejection fraction of 61% post stress.    The EKG portion of this study is negative for ischemia.    The patient reported no chest pain during the stress test.    There were no arrhythmias during stress.    PCI OM1 12/10/20 (images personally reviewed and interpreted)  1.  90% mid OM2 stenosis.  2.  Successful PCI with placement of a 2.25 x 26 mm drug-eluting stent reducing the 90% stenosis to 0%.  INGRIS 3 flow.  No dissection.  Good angiographic results.    Cath 11/11/20 (images personally reviewed and interpreted)  Left Main   The vessel is angiographically normal.   Left Anterior Descending   Subtotal mid occlusion. Diffusely diseased distal vessel   Left Circumflex   Long mid 80% between OM1 and OM2   First Obtuse Marginal Branch   90% mid   Second Obtuse Marginal Branch   80% mid   Right Coronary Artery   80% mid - moderate disffuse distal disease   LIMA Graft To Mid LAD   Mid to distal graft diffusely diseased 0 multile 80-90% lesions. Small diffusely diseased distal LAD   Saphenous Graft To RPDA   Patent with good runoff to PDA   Graft To 1st Diag   Patent to D1 with good runoff     Ex NUSRAT 4/18/18  Resting NUSRAT:   The right ankle brachial index was 1.04 which is normal.   The left ankle brachial index was 1.08 which is normal.   The right TBI is 0.55.   The left TBI is 0.98.   Exercise NUSRAT:   Post exercise right ankle pressure was 113 mmHg, resulting in a right post-exercise  NUSRAT of 0.67.   Post exercise left ankle pressure was 150 mmHg, resulting in a left post-exercise NUSRAT of 0.89.       ASSESSMENT:   # CAD s/p CABG/PCI OM 12/2020, asymptomatic  # HTN, controlled  # HFpEF, euvolemic  # CKD3a  # Chr AF on eliquis 5mg bid, HR controlled  # HLP on atorva 80mg  # PAD, abnl NUSRAT 4/18/18  # carotid atherosclerosis (CUS 9/2022)  # L>R LE edema, resolved.  LE venous US 4/2022 neg.  # aortic atherosclerosis (CT Chest 10/29/18)    PLAN:   Cont med rx  Cont Plavix 75mg qd  Cont eliquis 5mg bid  Stop hydrala/imdur  Start losartan 50mg qd, titrate prn  Re-enrolled in digital med programs, patient instructed to respond to emails.  RTC 1 month for BP check/lab review  Surveillance MPI 12/2022  Surveillance Carotid US 6 months (Mar 2023)      Eze Purdy MD, FACC

## 2022-09-23 ENCOUNTER — CLINICAL SUPPORT (OUTPATIENT)
Dept: REHABILITATION | Facility: HOSPITAL | Age: 72
End: 2022-09-23
Payer: MEDICARE

## 2022-09-23 DIAGNOSIS — M25.611 DECREASED RANGE OF MOTION OF RIGHT SHOULDER: Primary | ICD-10-CM

## 2022-09-23 DIAGNOSIS — R29.898 IMPAIRED STRENGTH OF SHOULDER MUSCLES: ICD-10-CM

## 2022-09-23 PROCEDURE — 97140 MANUAL THERAPY 1/> REGIONS: CPT | Mod: PN

## 2022-09-23 PROCEDURE — 97110 THERAPEUTIC EXERCISES: CPT | Mod: PN

## 2022-09-23 NOTE — PROGRESS NOTES
[facility]   Physical Therapy Progress Note     Name: Driss Hernandez Jr.  Clinic Number: [id number]    Therapy Diagnosis:   Encounter Diagnoses   Name Primary?    Decreased range of motion of right shoulder Yes    Impaired strength of shoulder muscles      Physician: Allison Hemphill PA-C    Visit Date: 9/23/2022       Physician Orders: PT Eval and Treat   Medical Diagnosis from Referral: M75.112 (ICD-10-CM) - Nontraumatic incomplete tear of left rotator cuff  Evaluation Date: 7/8/2022  Plan of Care Expiration: 12/31/2022    Authorization Period Expiration: 7/5/23  Visit # / Visits authorized: 8/ 20    Precautions: Standard, s/p Right partial thickness RTC repair with biceps tenodesis     POSTOPERATIVE PLAN: Plan to follow [facility] rehab protocol + biceps tenodesis. AROM of elbow ok, no resistive elbow exercises until 8     DOS: 7-5-22         POW: 8-9    Time In: 0915  Time Out: 1005    Total Billable Time: 40 minutes      SUBJECTIVE      Pt reports: shoulder continues to improve. Reports avoiding aggravating activities.   He was somewhat compliant with home exercise program.  Response to previous treatment: None  Functional change: improvements with ADL's.     Pain: 3/10  Location: right shoulder       OBJECTIVE     Objective Measures updated at progress report unless specified.        TREATMENT     Driss received the treatments listed below:      received therapeutic exercises to develop strength and ROM for 35  minutes including:     UBE x 4 min  ER/IR isometric walkouts GTB x 10 each way  ER/IR/Flex  Wall slides with pillow case x 2 x 10  codman's x 30 w physioball  supine wand ER x 15  Supine wand press-up 2 x 10 2lbs reclined  Sidelying shoulder flexion to 90deg 2 x 10  Bilateral shoulder ext/rows RTB 3 x 10  Sidleying ER 1lb 3 x 10  Supine horiz abd YTB 3 x 10      received the following manual therapy techniques: Soft tissue Mobilization were applied to the: R shoulder for  10 minutes, including:    CP post treatment x 10 right shoulder post treatment.     PROM GHJ in supine - all planes; gr III GHJ posterior/inferior mobs,    GHJ oscillation; L sidelying scapular mobilizations into retraction/protraction and upward rotation    PATIENT EDUCATION AND HOME EXERCISES     Education provided:   - postural awareness  - reducing compensations from UT/LS     Written Home Exercises Provided: yes.     Exercises were reviewed and Driss was able to demonstrate them prior to the end of the session.  Driss demonstrated good  understanding of the education provided.      See EMR under Patient Instructions for exercises provided 7/8/2022.    ASSESSMENT     Progressed therex today consisting of sub-maximal stabilization and AAROM activities. No c/o increased discomfort with prescribed activities.   Initiated sub-maximal isotonic therex with good response. Pt is progressing well towards goals.       Driss Is progressing well towards his goals.     Pt prognosis is Good.     Pt will continue to benefit from skilled outpatient physical therapy to address the deficits listed in the problem list box on initial evaluation, provide pt/family education and to maximize pt's level of independence in the home and community environment.     Pt's spiritual, cultural and educational needs considered and pt agreeable to plan of care and goals.     Anticipated barriers to physical therapy: none    Short Term Goals (8 Weeks):  Updated  9/7/22 Partially MET     1.Pt to increase strength by a 1/2 grade of muscles test to allow for improvement in functional activities such as performing chores.  2.Pt to improve range of motion by 50% to allow for improved functional mobility to allow for improvement in IADLs.   3.Pt to report compliance with HEP and demonstrate proper exercise technique to PT to show competence with self management of condition.  4.Decrease pain by 50% during functional activities.     Long Term Goals (16  Weeks):   In progress     1. Increase ROM to allow improved joint biomechanics during functional activities.   2.Increase trunk and lower extremity strength to within normal limits during functional activities.   3. Independent with home exercise program.   4. Full return to functional activities with manageable complaints.  5. Patient to demonstrate improved posture and body mechanics.  6. Decrease pain by 75% during functional activities.    PLAN     Plan of care Certification: 7/8/2022 to 10/8/22.    Steve Joyce, PT

## 2022-09-26 NOTE — PROGRESS NOTES
[facility]   Physical Therapy Progress Note     Name: Driss Hernandez Jr.  Clinic Number: [id number]    Therapy Diagnosis:   Encounter Diagnoses   Name Primary?    Decreased range of motion of right shoulder Yes    Impaired strength of shoulder muscles        Physician: Allison Hemphill PA-C    Visit Date: 9/27/2022       Physician Orders: PT Eval and Treat   Medical Diagnosis from Referral: M75.112 (ICD-10-CM) - Nontraumatic incomplete tear of left rotator cuff  Evaluation Date: 7/8/2022  Plan of Care Expiration: 12/31/2022    Authorization Period Expiration: 7/5/23  Visit # / Visits authorized: 8/ 20    Precautions: Standard, s/p Right partial thickness RTC repair with biceps tenodesis     POSTOPERATIVE PLAN: Plan to follow [facility] rehab protocol + biceps tenodesis. AROM of elbow ok, no resistive elbow exercises until 8     DOS: 7-5-22         POW: 12 weeks    Time In: 900AM  Time Out: 947AM    Total Billable Time: 47 minutes      SUBJECTIVE      Pt reports: His shoulder never stops hurting. He is seeing his MD tomorrow and he is going to mention it. He has been doing some of his exercises but only if he isn't hurting. Previously he was doing a lot at home but he has stopped that.   He was somewhat compliant with home exercise program.  Response to previous treatment: Soreness   Functional change: improvements with ADL's.     Pain: 3/10  Location: right shoulder       OBJECTIVE     Objective Measures updated at progress report unless specified.        TREATMENT     Driss received the treatments listed below:      received therapeutic exercises to develop strength and ROM for 32 minutes including:     UBE x 4 min  ER/IR isometric walkouts GTB x 10 each way  ER/IR  Wall slides with pillow case x 2 x 10  supine wand ER x 15  Supine wand press-up 2 x 10 2lbs reclined  Sidelying shoulder flexion to 90deg 2 x [id number]  Bilateral shoulder ext/rows RTB 3 x 10  Sidleying ER 1lb 3 x  10  Supine horiz abd YTB 3 x 10      received the following manual therapy techniques: Soft tissue Mobilization were applied to the: R shoulder for 15 minutes, including:    CP post treatment x 10 right shoulder post treatment. (Untimed and unbilled)    PROM GHJ in supine - all planes; gr III GHJ posterior/inferior mobs,    GHJ oscillation;   STM UT, pec    PATIENT EDUCATION AND HOME EXERCISES     Education provided:   - postural awareness  - reducing compensations from UT/LS     Written Home Exercises Provided: yes.     Exercises were reviewed and Driss was able to demonstrate them prior to the end of the session.  Dirss demonstrated good  understanding of the education provided.      See EMR under Patient Instructions for exercises provided 7/8/2022.    ASSESSMENT   Driss tolerated session well.  Continued previously prescribed without issue. Some discomfort with walkouts in bicep but not intolerable. Some muscle guarding with passive range of motion. Soft tissue dysfunction noted on pecs and upper traps. Pt would benefit from continued skilled physical therapy in order to reach pt's goals.           Driss Is progressing well towards his goals.     Pt prognosis is Good.     Pt will continue to benefit from skilled outpatient physical therapy to address the deficits listed in the problem list box on initial evaluation, provide pt/family education and to maximize pt's level of independence in the home and community environment.     Pt's spiritual, cultural and educational needs considered and pt agreeable to plan of care and goals.     Anticipated barriers to physical therapy: none    Short Term Goals (8 Weeks):  Updated  9/7/22 Partially MET     1.Pt to increase strength by a 1/2 grade of muscles test to allow for improvement in functional activities such as performing chores.  2.Pt to improve range of motion by 50% to allow for improved functional mobility to allow for improvement in IADLs.   3.Pt to report  compliance with HEP and demonstrate proper exercise technique to PT to show competence with self management of condition.  4.Decrease pain by 50% during functional activities.     Long Term Goals (16 Weeks):   In progress     1. Increase ROM to allow improved joint biomechanics during functional activities.   2.Increase trunk and lower extremity strength to within normal limits during functional activities.   3. Independent with home exercise program.   4. Full return to functional activities with manageable complaints.  5. Patient to demonstrate improved posture and body mechanics.  6. Decrease pain by 75% during functional activities.    PLAN     Plan of care Certification: 7/8/2022 to 10/8/22.    Gustavo Talley, PTA  09/27/2022

## 2022-09-27 ENCOUNTER — CLINICAL SUPPORT (OUTPATIENT)
Dept: REHABILITATION | Facility: HOSPITAL | Age: 72
End: 2022-09-27
Payer: MEDICARE

## 2022-09-27 DIAGNOSIS — M25.611 DECREASED RANGE OF MOTION OF RIGHT SHOULDER: Primary | ICD-10-CM

## 2022-09-27 DIAGNOSIS — R29.898 IMPAIRED STRENGTH OF SHOULDER MUSCLES: ICD-10-CM

## 2022-09-27 PROCEDURE — 97140 MANUAL THERAPY 1/> REGIONS: CPT | Mod: PN,CQ

## 2022-09-27 PROCEDURE — 97110 THERAPEUTIC EXERCISES: CPT | Mod: PN,CQ

## 2022-09-29 ENCOUNTER — OFFICE VISIT (OUTPATIENT)
Dept: ORTHOPEDICS | Facility: CLINIC | Age: 72
End: 2022-09-29
Payer: MEDICARE

## 2022-09-29 VITALS
RESPIRATION RATE: 18 BRPM | OXYGEN SATURATION: 99 % | WEIGHT: 183 LBS | HEART RATE: 49 BPM | SYSTOLIC BLOOD PRESSURE: 126 MMHG | DIASTOLIC BLOOD PRESSURE: 68 MMHG | BODY MASS INDEX: 28.66 KG/M2

## 2022-09-29 DIAGNOSIS — L72.3 SEBACEOUS CYST: ICD-10-CM

## 2022-09-29 DIAGNOSIS — M75.112 NONTRAUMATIC INCOMPLETE TEAR OF LEFT ROTATOR CUFF: Primary | ICD-10-CM

## 2022-09-29 PROCEDURE — 3074F SYST BP LT 130 MM HG: CPT | Mod: CPTII,S$GLB,, | Performed by: ORTHOPAEDIC SURGERY

## 2022-09-29 PROCEDURE — 1159F PR MEDICATION LIST DOCUMENTED IN MEDICAL RECORD: ICD-10-PCS | Mod: CPTII,S$GLB,, | Performed by: ORTHOPAEDIC SURGERY

## 2022-09-29 PROCEDURE — 3066F PR DOCUMENTATION OF TREATMENT FOR NEPHROPATHY: ICD-10-PCS | Mod: CPTII,S$GLB,, | Performed by: ORTHOPAEDIC SURGERY

## 2022-09-29 PROCEDURE — 3008F PR BODY MASS INDEX (BMI) DOCUMENTED: ICD-10-PCS | Mod: CPTII,S$GLB,, | Performed by: ORTHOPAEDIC SURGERY

## 2022-09-29 PROCEDURE — 99999 PR PBB SHADOW E&M-EST. PATIENT-LVL III: CPT | Mod: PBBFAC,,, | Performed by: ORTHOPAEDIC SURGERY

## 2022-09-29 PROCEDURE — 99024 PR POST-OP FOLLOW-UP VISIT: ICD-10-PCS | Mod: S$GLB,,, | Performed by: ORTHOPAEDIC SURGERY

## 2022-09-29 PROCEDURE — 3060F POS MICROALBUMINURIA REV: CPT | Mod: CPTII,S$GLB,, | Performed by: ORTHOPAEDIC SURGERY

## 2022-09-29 PROCEDURE — 4010F PR ACE/ARB THEARPY RXD/TAKEN: ICD-10-PCS | Mod: CPTII,S$GLB,, | Performed by: ORTHOPAEDIC SURGERY

## 2022-09-29 PROCEDURE — 99999 PR PBB SHADOW E&M-EST. PATIENT-LVL III: ICD-10-PCS | Mod: PBBFAC,,, | Performed by: ORTHOPAEDIC SURGERY

## 2022-09-29 PROCEDURE — 3066F NEPHROPATHY DOC TX: CPT | Mod: CPTII,S$GLB,, | Performed by: ORTHOPAEDIC SURGERY

## 2022-09-29 PROCEDURE — 1160F PR REVIEW ALL MEDS BY PRESCRIBER/CLIN PHARMACIST DOCUMENTED: ICD-10-PCS | Mod: CPTII,S$GLB,, | Performed by: ORTHOPAEDIC SURGERY

## 2022-09-29 PROCEDURE — 3060F PR POS MICROALBUMINURIA RESULT DOCUMENTED/REVIEW: ICD-10-PCS | Mod: CPTII,S$GLB,, | Performed by: ORTHOPAEDIC SURGERY

## 2022-09-29 PROCEDURE — 3078F PR MOST RECENT DIASTOLIC BLOOD PRESSURE < 80 MM HG: ICD-10-PCS | Mod: CPTII,S$GLB,, | Performed by: ORTHOPAEDIC SURGERY

## 2022-09-29 PROCEDURE — 99024 POSTOP FOLLOW-UP VISIT: CPT | Mod: S$GLB,,, | Performed by: ORTHOPAEDIC SURGERY

## 2022-09-29 PROCEDURE — 3044F HG A1C LEVEL LT 7.0%: CPT | Mod: CPTII,S$GLB,, | Performed by: ORTHOPAEDIC SURGERY

## 2022-09-29 PROCEDURE — 1125F PR PAIN SEVERITY QUANTIFIED, PAIN PRESENT: ICD-10-PCS | Mod: CPTII,S$GLB,, | Performed by: ORTHOPAEDIC SURGERY

## 2022-09-29 PROCEDURE — 3288F FALL RISK ASSESSMENT DOCD: CPT | Mod: CPTII,S$GLB,, | Performed by: ORTHOPAEDIC SURGERY

## 2022-09-29 PROCEDURE — 3008F BODY MASS INDEX DOCD: CPT | Mod: CPTII,S$GLB,, | Performed by: ORTHOPAEDIC SURGERY

## 2022-09-29 PROCEDURE — 1125F AMNT PAIN NOTED PAIN PRSNT: CPT | Mod: CPTII,S$GLB,, | Performed by: ORTHOPAEDIC SURGERY

## 2022-09-29 PROCEDURE — 4010F ACE/ARB THERAPY RXD/TAKEN: CPT | Mod: CPTII,S$GLB,, | Performed by: ORTHOPAEDIC SURGERY

## 2022-09-29 PROCEDURE — 3044F PR MOST RECENT HEMOGLOBIN A1C LEVEL <7.0%: ICD-10-PCS | Mod: CPTII,S$GLB,, | Performed by: ORTHOPAEDIC SURGERY

## 2022-09-29 PROCEDURE — 1160F RVW MEDS BY RX/DR IN RCRD: CPT | Mod: CPTII,S$GLB,, | Performed by: ORTHOPAEDIC SURGERY

## 2022-09-29 PROCEDURE — 1159F MED LIST DOCD IN RCRD: CPT | Mod: CPTII,S$GLB,, | Performed by: ORTHOPAEDIC SURGERY

## 2022-09-29 PROCEDURE — 3072F PR LOW RISK FOR RETINOPATHY: ICD-10-PCS | Mod: CPTII,S$GLB,, | Performed by: ORTHOPAEDIC SURGERY

## 2022-09-29 PROCEDURE — 3288F PR FALLS RISK ASSESSMENT DOCUMENTED: ICD-10-PCS | Mod: CPTII,S$GLB,, | Performed by: ORTHOPAEDIC SURGERY

## 2022-09-29 PROCEDURE — 1101F PT FALLS ASSESS-DOCD LE1/YR: CPT | Mod: CPTII,S$GLB,, | Performed by: ORTHOPAEDIC SURGERY

## 2022-09-29 PROCEDURE — 1101F PR PT FALLS ASSESS DOC 0-1 FALLS W/OUT INJ PAST YR: ICD-10-PCS | Mod: CPTII,S$GLB,, | Performed by: ORTHOPAEDIC SURGERY

## 2022-09-29 PROCEDURE — 3078F DIAST BP <80 MM HG: CPT | Mod: CPTII,S$GLB,, | Performed by: ORTHOPAEDIC SURGERY

## 2022-09-29 PROCEDURE — 3072F LOW RISK FOR RETINOPATHY: CPT | Mod: CPTII,S$GLB,, | Performed by: ORTHOPAEDIC SURGERY

## 2022-09-29 PROCEDURE — 3074F PR MOST RECENT SYSTOLIC BLOOD PRESSURE < 130 MM HG: ICD-10-PCS | Mod: CPTII,S$GLB,, | Performed by: ORTHOPAEDIC SURGERY

## 2022-09-29 RX ORDER — HYDROCODONE BITARTRATE AND ACETAMINOPHEN 5; 325 MG/1; MG/1
1 TABLET ORAL EVERY 8 HOURS PRN
Qty: 15 TABLET | Refills: 0 | Status: SHIPPED | OUTPATIENT
Start: 2022-09-29 | End: 2023-09-20

## 2022-09-29 RX ORDER — METHYLPREDNISOLONE 4 MG/1
TABLET ORAL
Qty: 21 EACH | Refills: 0 | Status: SHIPPED | OUTPATIENT
Start: 2022-09-29 | End: 2022-10-20

## 2022-09-29 RX ORDER — METHYLPREDNISOLONE 4 MG/1
TABLET ORAL
Qty: 21 EACH | Refills: 0 | Status: SHIPPED | OUTPATIENT
Start: 2022-09-29 | End: 2022-09-29

## 2022-09-29 NOTE — PROGRESS NOTES
Postoperative Visit    History of Present Illness:   Driss is 12 weeks s/p right shoulder ATS, regeneten, arthroscopic biceps tenodesis (DOS-7/5/22)  Pain is worse than when I saw him last time   Has been doing a lot of activity - helping his friends  clean out their house, mopping etc. This worsens pain  Pain at night       Physical Examination:    Vitals:    09/29/22 0853   BP: 126/68   Pulse: (!) 49   Resp: 18   SpO2: 99%   Weight: 83 kg (182 lb 15.7 oz)   PainSc:   4   PainLoc: Shoulder      NAD  right upper extremity:  Incisions over the shoulder well healed  Active FE to 140  Supine can get to 150 with passive motion  ER in abduction to 30, IR to 10   LTSI m/u/r  2+ RP  + EPL, IO, FDS, FDP     Assessment/Plan:  71 y.o. male 12 weeks s/p right shoulder arthroscopy , rotator cuff repair and arthroscopic biceps tenodesis  (DOS-7/5/22)    Plan  Pain likely due to stiffness - continue PT  Continue tylenol PRN  Focus on gentle stretching, no strengthening or excessive activity for now   RTC 6 weeks    All questions were answered in detail. The patient is in full agreement with the treatment plan and will proceed accordingly.

## 2022-09-30 ENCOUNTER — CLINICAL SUPPORT (OUTPATIENT)
Dept: REHABILITATION | Facility: HOSPITAL | Age: 72
End: 2022-09-30
Payer: MEDICARE

## 2022-09-30 DIAGNOSIS — R29.898 IMPAIRED STRENGTH OF SHOULDER MUSCLES: ICD-10-CM

## 2022-09-30 DIAGNOSIS — M25.611 DECREASED RANGE OF MOTION OF RIGHT SHOULDER: Primary | ICD-10-CM

## 2022-09-30 PROCEDURE — 97140 MANUAL THERAPY 1/> REGIONS: CPT | Mod: PN

## 2022-09-30 PROCEDURE — 97110 THERAPEUTIC EXERCISES: CPT | Mod: PN

## 2022-09-30 NOTE — PROGRESS NOTES
[facility]   Physical Therapy Progress Note     Name: Driss Hernandez Jr.  Clinic Number: [id number]    Therapy Diagnosis:   Encounter Diagnoses   Name Primary?    Decreased range of motion of right shoulder Yes    Impaired strength of shoulder muscles        Physician: Allison Hemphill PA-C    Visit Date: 9/30/2022       Physician Orders: PT Eval and Treat   Medical Diagnosis from Referral: M75.112 (ICD-10-CM) - Nontraumatic incomplete tear of left rotator cuff  Evaluation Date: 7/8/2022  Plan of Care Expiration: 12/31/2022    Authorization Period Expiration: 7/5/23  Visit # / Visits authorized: 9/ 20    Precautions: Standard, s/p Right partial thickness RTC repair with biceps tenodesis     POSTOPERATIVE PLAN: Plan to follow [facility] rehab protocol + biceps tenodesis. AROM of elbow ok, no resistive elbow exercises until 8     DOS: 7-5-22         POW: 12 weeks    Time In: 900  Time Out: 950    Total Billable Time: 45 minutes      SUBJECTIVE     Pt reports: pt returns after follow up with MD to continue therapy.  Reports continued night pain.     He was somewhat compliant with home exercise program.  Response to previous treatment: good  Functional change: improvements with ADL's.     Pain: 3/10  Location: right shoulder       OBJECTIVE     Objective Measures updated at progress report unless specified.      TREATMENT     Driss received the treatments listed below:      received therapeutic exercises to develop strength and ROM for 40 minutes including:     UBE x 6 min  ER/IR YTB 2 x 10  Wall slides with pillow case x 2 x 10  supine wand ER x 15  Supine wand press-up 2 x 10 2lbs reclined  Supine wand flexion x 10  Sidelying shoulder flexion to 90deg 2 x 10  Bilateral shoulder ext/rows RTB 3 x 10  Sidleying ER 1lb 3 x 10  Supine horiz abd RTB 3 x 10  Bicep curls 3 x 10 YTB    received the following manual therapy techniques: Soft tissue Mobilization were applied to the: R shoulder for  10 minutes, including:    CP post treatment x 10 right shoulder post treatment.     PROM GHJ in supine - all planes; gr III GHJ posterior/inferior mobs,    GHJ oscillation;   STM UT, pec    PATIENT EDUCATION AND HOME EXERCISES     Education provided:   - postural awareness  - reducing compensations from UT/LS     Written Home Exercises Provided: yes.     Exercises were reviewed and Driss was able to demonstrate them prior to the end of the session.  Driss demonstrated good  understanding of the education provided.      See EMR under Patient Instructions for exercises provided 7/8/2022.    ASSESSMENT     Progressed therex today consisting of sub-maximal stabilization and AAROM activities. No c/o increased discomfort with prescribed activities.  active GH flexion limited to 115 deg. Good response to exercise progression.   Driss Is progressing well towards his goals.     Pt prognosis is Good.     Pt will continue to benefit from skilled outpatient physical therapy to address the deficits listed in the problem list box on initial evaluation, provide pt/family education and to maximize pt's level of independence in the home and community environment.     Pt's spiritual, cultural and educational needs considered and pt agreeable to plan of care and goals.     Anticipated barriers to physical therapy: none    Short Term Goals (8 Weeks):  Updated  9/7/22 Partially MET     1.Pt to increase strength by a 1/2 grade of muscles test to allow for improvement in functional activities such as performing chores.  2.Pt to improve range of motion by 50% to allow for improved functional mobility to allow for improvement in IADLs.   3.Pt to report compliance with HEP and demonstrate proper exercise technique to PT to show competence with self management of condition.  4.Decrease pain by 50% during functional activities.     Long Term Goals (16 Weeks):   In progress     1. Increase ROM to allow improved joint biomechanics during  functional activities.   2.Increase trunk and lower extremity strength to within normal limits during functional activities.   3. Independent with home exercise program.   4. Full return to functional activities with manageable complaints.  5. Patient to demonstrate improved posture and body mechanics.  6. Decrease pain by 75% during functional activities.    PLAN     Plan of care Certification: 7/8/2022 to 10/8/22.    Steve Joyce, PT  09/30/2022

## 2022-10-03 ENCOUNTER — CLINICAL SUPPORT (OUTPATIENT)
Dept: REHABILITATION | Facility: HOSPITAL | Age: 72
End: 2022-10-03
Payer: MEDICARE

## 2022-10-03 DIAGNOSIS — M25.611 DECREASED RANGE OF MOTION OF RIGHT SHOULDER: Primary | ICD-10-CM

## 2022-10-03 DIAGNOSIS — R29.898 IMPAIRED STRENGTH OF SHOULDER MUSCLES: ICD-10-CM

## 2022-10-03 PROCEDURE — 97110 THERAPEUTIC EXERCISES: CPT | Mod: PN

## 2022-10-03 NOTE — PROGRESS NOTES
[facility]   Physical Therapy Progress Note     Name: Driss Hernandez Jr.  Clinic Number: [id number]    Therapy Diagnosis:   Encounter Diagnoses   Name Primary?    Decreased range of motion of right shoulder Yes    Impaired strength of shoulder muscles        Physician: Allison Hemphill PA-C    Visit Date: 10/3/2022       Physician Orders: PT Eval and Treat   Medical Diagnosis from Referral: M75.112 (ICD-10-CM) - Nontraumatic incomplete tear of left rotator cuff  Evaluation Date: 7/8/2022  Plan of Care Expiration: 12/31/2022    Authorization Period Expiration: 7/5/23  Visit # / Visits authorized: 9/ 20    Precautions: Standard, s/p Right partial thickness RTC repair with biceps tenodesis     POSTOPERATIVE PLAN: Plan to follow [facility] rehab protocol + biceps tenodesis. AROM of elbow ok, no resistive elbow exercises until 8     DOS: 7-5-22         POW: 12 weeks    Time In: 1042  Time Out: 1131    Total Billable Time: 45 minutes      SUBJECTIVE     Pt reports: overall the shoulder is doing better. Reports fair compliance avoiding strenuous activities at home.     He was somewhat compliant with home exercise program.  Response to previous treatment: good  Functional change: improvements with ADL's.     Pain: 3/10  Location: right shoulder       OBJECTIVE     Objective Measures updated at progress report unless specified.      TREATMENT     Driss received the treatments listed below:      received therapeutic exercises to develop strength and ROM for 40 minutes including:     UBE x 6 min  ER/IR YTB 2 x 10: np  Wall slides with pillow case x 2 x 10  supine wand ER x 15  Supine wand press-up 3 x 10 5 lbs  Supine wand flexion x 10  Sidelying shoulder flexion to 90deg 2 x 10  Bilateral shoulder ext/rows RTB 3 x 10  Sidleying ER 1lb 3 x 10  Supine horiz abd RTB 3 x 10  Bicep curls 3 x 10 YTB    received the following manual therapy techniques: Soft tissue Mobilization were applied to the: R  shoulder for 10 minutes, including:    CP post treatment x 10 right shoulder post treatment.     PROM GHJ in supine - all planes; gr III GHJ posterior/inferior mobs,    GHJ oscillation;   STM UT, pec    PATIENT EDUCATION AND HOME EXERCISES     Education provided:   - postural awareness  - reducing compensations from UT/LS     Written Home Exercises Provided: yes.     Exercises were reviewed and Driss was able to demonstrate them prior to the end of the session.  Driss demonstrated good  understanding of the education provided.      See EMR under Patient Instructions for exercises provided 7/8/2022.    ASSESSMENT     Progressed therex today consisting of sub-maximal stabilization and AAROM activities. No c/o increased discomfort with prescribed activities.  unable to progress to CKC activities at 90 deg flexion. Good response to exercise progression.   Driss Is progressing well towards his goals.     Pt prognosis is Good.     Pt will continue to benefit from skilled outpatient physical therapy to address the deficits listed in the problem list box on initial evaluation, provide pt/family education and to maximize pt's level of independence in the home and community environment.     Pt's spiritual, cultural and educational needs considered and pt agreeable to plan of care and goals.     Anticipated barriers to physical therapy: none    Short Term Goals (8 Weeks):  Updated  9/7/22 Partially MET     1.Pt to increase strength by a 1/2 grade of muscles test to allow for improvement in functional activities such as performing chores.  2.Pt to improve range of motion by 50% to allow for improved functional mobility to allow for improvement in IADLs.   3.Pt to report compliance with HEP and demonstrate proper exercise technique to PT to show competence with self management of condition.  4.Decrease pain by 50% during functional activities.     Long Term Goals (16 Weeks):   In progress     1. Increase ROM to allow improved  joint biomechanics during functional activities.   2.Increase trunk and lower extremity strength to within normal limits during functional activities.   3. Independent with home exercise program.   4. Full return to functional activities with manageable complaints.  5. Patient to demonstrate improved posture and body mechanics.  6. Decrease pain by 75% during functional activities.    PLAN     Plan of care Certification: 7/8/2022 to 10/8/22.    Steve Joyce, PT  10/03/2022

## 2022-10-05 ENCOUNTER — LAB VISIT (OUTPATIENT)
Dept: LAB | Facility: HOSPITAL | Age: 72
End: 2022-10-05
Attending: INTERNAL MEDICINE
Payer: MEDICARE

## 2022-10-05 DIAGNOSIS — I10 ESSENTIAL HYPERTENSION: ICD-10-CM

## 2022-10-05 LAB
ANION GAP SERPL CALC-SCNC: 6 MMOL/L (ref 8–16)
BUN SERPL-MCNC: 52 MG/DL (ref 8–23)
CALCIUM SERPL-MCNC: 10.6 MG/DL (ref 8.7–10.5)
CHLORIDE SERPL-SCNC: 102 MMOL/L (ref 95–110)
CO2 SERPL-SCNC: 28 MMOL/L (ref 23–29)
CREAT SERPL-MCNC: 1.8 MG/DL (ref 0.5–1.4)
EST. GFR  (NO RACE VARIABLE): 39 ML/MIN/1.73 M^2
GLUCOSE SERPL-MCNC: 267 MG/DL (ref 70–110)
POTASSIUM SERPL-SCNC: 5.7 MMOL/L (ref 3.5–5.1)
SODIUM SERPL-SCNC: 136 MMOL/L (ref 136–145)

## 2022-10-05 PROCEDURE — 36415 COLL VENOUS BLD VENIPUNCTURE: CPT | Performed by: INTERNAL MEDICINE

## 2022-10-05 PROCEDURE — 80048 BASIC METABOLIC PNL TOTAL CA: CPT | Performed by: INTERNAL MEDICINE

## 2022-10-19 NOTE — PROGRESS NOTES
[facility]   Physical Therapy Progress Note     Name: Driss Hernandez Jr.  Clinic Number: [id number]    Therapy Diagnosis:   Encounter Diagnoses   Name Primary?    Decreased range of motion of right shoulder Yes    Impaired strength of shoulder muscles          Physician: Allison Hemphill PA-C    Visit Date: 10/20/2022       Physician Orders: PT Eval and Treat   Medical Diagnosis from Referral: M75.112 (ICD-10-CM) - Nontraumatic incomplete tear of left rotator cuff  Evaluation Date: 2022  Plan of Care Expiration: 2022    Authorization Period Expiration: 23  Visit # / Visits authorized: 10/ 20    Precautions: Standard, s/p Right partial thickness RTC repair with biceps tenodesis     POSTOPERATIVE PLAN: Plan to follow [facility] rehab protocol + biceps tenodesis. AROM of elbow ok, no resistive elbow exercises until 8     DOS: 22         POW: 12 weeks    Time In: 1047AM  Time Out: 1133AM    Total Billable Time: 46 minutes      SUBJECTIVE     Pt reports: He doesn't understand the pain. It hurts when he stops moving and at night. His bicep started hurting recently which might be from picking up stuff like his tool box. His friend recently  and he has been helping clean out all his stuff and some are a 1000 pounds. He has the most difficulty and pain when reaching across the body turning on a light switch.    He was somewhat compliant with home exercise program.  Response to previous treatment: Fine  Functional change: improvements with ADL's.     Pain: 3/10  Location: right shoulder       OBJECTIVE     Objective Measures updated at progress report unless specified.      TREATMENT     Driss received the treatments listed below:      received therapeutic exercises to develop strength and ROM for 31 minutes including:     UBE x 6 min  ER/IR YTB 2 x 10: np  Wall slides with pillow case x 2 x 10  supine wand ER x 15  Supine wand press-up 3 x 10 5 lbs  Supine wand flexion x  10  Sidelying shoulder flexion to 90deg 2 x 10  Bilateral shoulder ext/rows RTB 3 x 10  Sidleying ER 2lb 2 x 10  Supine horiz abd RTB 3 x 10  Bicep curls 3 x 10 YTB    received the following manual therapy techniques: Soft tissue Mobilization were applied to the: R shoulder for 15 minutes, including:    CP post treatment x 10 right shoulder post treatment.     PROM GHJ in supine - all planes; gr III GHJ posterior/inferior mobs,    GHJ oscillation;   STM UT, pec    PATIENT EDUCATION AND HOME EXERCISES     Education provided:   - postural awareness  - reducing compensations from UT/LS     Written Home Exercises Provided: yes.     Exercises were reviewed and Driss was able to demonstrate them prior to the end of the session.  Driss demonstrated good  understanding of the education provided.      See EMR under Patient Instructions for exercises provided 7/8/2022.    ASSESSMENT     Continued previous progressions without c/o pain. Pt limited in active ROM of shoulder. Pt reports recent pain in biceps though pt tolerated resisted elbow flexion without issue. Driss Is progressing well towards his goals.     Pt prognosis is Good.     Pt will continue to benefit from skilled outpatient physical therapy to address the deficits listed in the problem list box on initial evaluation, provide pt/family education and to maximize pt's level of independence in the home and community environment.     Pt's spiritual, cultural and educational needs considered and pt agreeable to plan of care and goals.     Anticipated barriers to physical therapy: none    Short Term Goals (8 Weeks):  Updated  9/7/22 Partially MET     1.Pt to increase strength by a 1/2 grade of muscles test to allow for improvement in functional activities such as performing chores.  2.Pt to improve range of motion by 50% to allow for improved functional mobility to allow for improvement in IADLs.   3.Pt to report compliance with HEP and demonstrate proper exercise  technique to PT to show competence with self management of condition.  4.Decrease pain by 50% during functional activities.     Long Term Goals (16 Weeks):   In progress     1. Increase ROM to allow improved joint biomechanics during functional activities.   2.Increase trunk and lower extremity strength to within normal limits during functional activities.   3. Independent with home exercise program.   4. Full return to functional activities with manageable complaints.  5. Patient to demonstrate improved posture and body mechanics.  6. Decrease pain by 75% during functional activities.    PLAN     Plan of care Certification: 7/8/2022 to 10/8/22.    Gustavo Talley, PTA  10/20/2022

## 2022-10-20 ENCOUNTER — CLINICAL SUPPORT (OUTPATIENT)
Dept: REHABILITATION | Facility: HOSPITAL | Age: 72
End: 2022-10-20
Payer: MEDICARE

## 2022-10-20 DIAGNOSIS — M25.611 DECREASED RANGE OF MOTION OF RIGHT SHOULDER: Primary | ICD-10-CM

## 2022-10-20 DIAGNOSIS — R29.898 IMPAIRED STRENGTH OF SHOULDER MUSCLES: ICD-10-CM

## 2022-10-20 PROCEDURE — 97110 THERAPEUTIC EXERCISES: CPT | Mod: PN,CQ

## 2022-10-20 PROCEDURE — 97140 MANUAL THERAPY 1/> REGIONS: CPT | Mod: PN,CQ

## 2022-10-21 ENCOUNTER — PATIENT MESSAGE (OUTPATIENT)
Dept: ENDOCRINOLOGY | Facility: CLINIC | Age: 72
End: 2022-10-21
Payer: MEDICARE

## 2022-10-25 ENCOUNTER — CLINICAL SUPPORT (OUTPATIENT)
Dept: REHABILITATION | Facility: HOSPITAL | Age: 72
End: 2022-10-25
Payer: MEDICARE

## 2022-10-25 DIAGNOSIS — M25.611 DECREASED RANGE OF MOTION OF RIGHT SHOULDER: Primary | ICD-10-CM

## 2022-10-25 DIAGNOSIS — R29.898 IMPAIRED STRENGTH OF SHOULDER MUSCLES: ICD-10-CM

## 2022-10-25 PROCEDURE — 97110 THERAPEUTIC EXERCISES: CPT | Mod: PN

## 2022-10-25 PROCEDURE — 97140 MANUAL THERAPY 1/> REGIONS: CPT | Mod: PN

## 2022-10-25 NOTE — PROGRESS NOTES
[facility]   Physical Therapy Progress Note     Name: Driss Hernandez Jr.  Clinic Number: [id number]    Therapy Diagnosis:   Encounter Diagnoses   Name Primary?    Decreased range of motion of right shoulder Yes    Impaired strength of shoulder muscles          Physician: Allison Hemphill PA-C    Visit Date: 10/25/2022       Physician Orders: PT Eval and Treat   Medical Diagnosis from Referral: M75.112 (ICD-10-CM) - Nontraumatic incomplete tear of left rotator cuff  Evaluation Date: 7/8/2022  Plan of Care Expiration: 12/31/2022    Authorization Period Expiration: 7/5/23  Visit # / Visits authorized: 11/ 20    Precautions: Standard, s/p Right partial thickness RTC repair with biceps tenodesis     POSTOPERATIVE PLAN: Plan to follow [facility] rehab protocol + biceps tenodesis. AROM of elbow ok, no resistive elbow exercises until 8     DOS: 7-5-22         POW: 14 weeks    Time In: 0830  Time Out: 0915    Total Billable Time: 40 minutes      SUBJECTIVE     Pt reports:  pt reports he is doing better overall but c/o pain and restriction reaching overhead.  Reports following up with MD next week.     He was somewhat compliant with home exercise program.  Response to previous treatment: fair  Functional change: improvements with ADL's.     Pain: 3/10  Location: right shoulder       OBJECTIVE     Objective Measures updated at progress report unless specified.      Shoulder Range of Motion:   ACTIVE ROM LEFT RIGHT   Flexion 150 115   Abduction 140 90   IR Functional T12 NT   ER Functional T2 NT      PASSIVE ROM LEFT RIGHT   Flexion    Abduction       STRENGTH LEFT RIGHT   Flexion 5/5 3+   Abduction 4/5 3+   Extension NT NT   IR at 90 4/5 3+   ER at 90 4/5 3+   Elbow Flexion 5/5 NT        TREATMENT     Driss received the treatments listed below:      received therapeutic exercises to develop strength and ROM for 35 minutes including:     UBE x 6 min  ER/IR YTB 2 x 10: np  Wall slides  with pillow case x 2 x 10  supine wand ER x 15 5lbs  Supine wand press-up 3 x 10 5 lbs  Supine wand flexion x 10  Sidelying shoulder flexion to 90deg 2 x 10  Bilateral shoulder ext/rows RTB 3 x 10  Sidleying ER 2lb 2 x 10  Supine horiz abd RTB 3 x 10  Bicep curls 3 x 10 GTB    received the following manual therapy techniques: Soft tissue Mobilization were applied to the: R shoulder for 10 minutes, including:  PROM GHJ in supine - all planes; gr III GHJ posterior/inferior mobs,    GHJ oscillation;     CP post treatment x 10 right shoulder post treatment.     PATIENT EDUCATION AND HOME EXERCISES     Education provided:   - postural awareness  - reducing compensations from UT/LS     Written Home Exercises Provided: yes.     Exercises were reviewed and Driss was able to demonstrate them prior to the end of the session.  Driss demonstrated good  understanding of the education provided.      See EMR under Patient Instructions for exercises provided 7/8/2022.    ASSESSMENT     Pt continues to demonstrate moderate limitations in Active GH functional range of motion.  AA GH elevation limited to 115 deg with c/o pain. Progressed therex today consisting of sub-maximal stabilization and AAROM activities. No c/o increased discomfort with prescribed activities. Good response to exercise progression.  Driss Is progressing fair towards his goals.     Pt prognosis is Good.     Pt will continue to benefit from skilled outpatient physical therapy to address the deficits listed in the problem list box on initial evaluation, provide pt/family education and to maximize pt's level of independence in the home and community environment.     Pt's spiritual, cultural and educational needs considered and pt agreeable to plan of care and goals.     Anticipated barriers to physical therapy: none    Short Term Goals (8 Weeks):  Updated  10/25/22 Partially MET     1.Pt to increase strength by a 1/2 grade of muscles test to allow for improvement in  functional activities such as performing chores.  MET  2.Pt to improve range of motion by 50% to allow for improved functional mobility to allow for improvement in IADLs.   3.Pt to report compliance with HEP and demonstrate proper exercise technique to PT to show competence with self management of condition.  4.Decrease pain by 50% during functional activities.  MET     Long Term Goals (16 Weeks):   In progress     1. Increase ROM to allow improved joint biomechanics during functional activities.   2.Increase trunk and lower extremity strength to within normal limits during functional activities.   3. Independent with home exercise program.   4. Full return to functional activities with manageable complaints.  5. Patient to demonstrate improved posture and body mechanics.  6. Decrease pain by 75% during functional activities.    PLAN     Continue with established  PT Plan of Care towards patient goals.  Progress functional strengthening next visit.      Steve Joyce, PT  10/25/2022

## 2022-10-28 ENCOUNTER — OFFICE VISIT (OUTPATIENT)
Dept: CARDIOLOGY | Facility: CLINIC | Age: 72
End: 2022-10-28
Payer: MEDICARE

## 2022-10-28 VITALS
RESPIRATION RATE: 18 BRPM | HEIGHT: 67 IN | WEIGHT: 182 LBS | HEART RATE: 54 BPM | SYSTOLIC BLOOD PRESSURE: 159 MMHG | DIASTOLIC BLOOD PRESSURE: 80 MMHG | BODY MASS INDEX: 28.56 KG/M2 | OXYGEN SATURATION: 99 %

## 2022-10-28 DIAGNOSIS — E08.22 DIABETES MELLITUS DUE TO UNDERLYING CONDITION WITH STAGE 3A CHRONIC KIDNEY DISEASE, WITH LONG-TERM CURRENT USE OF INSULIN: ICD-10-CM

## 2022-10-28 DIAGNOSIS — I25.810 CORONARY ARTERY DISEASE INVOLVING CORONARY BYPASS GRAFT OF NATIVE HEART WITHOUT ANGINA PECTORIS: Primary | ICD-10-CM

## 2022-10-28 DIAGNOSIS — Z95.1 HX OF CABG: ICD-10-CM

## 2022-10-28 DIAGNOSIS — Z79.4 DIABETES MELLITUS DUE TO UNDERLYING CONDITION WITH STAGE 3A CHRONIC KIDNEY DISEASE, WITH LONG-TERM CURRENT USE OF INSULIN: ICD-10-CM

## 2022-10-28 DIAGNOSIS — N18.31 DIABETES MELLITUS DUE TO UNDERLYING CONDITION WITH STAGE 3A CHRONIC KIDNEY DISEASE, WITH LONG-TERM CURRENT USE OF INSULIN: ICD-10-CM

## 2022-10-28 DIAGNOSIS — I70.0 AORTIC ATHEROSCLEROSIS: ICD-10-CM

## 2022-10-28 DIAGNOSIS — Z98.61 HISTORY OF PERCUTANEOUS CORONARY INTERVENTION: ICD-10-CM

## 2022-10-28 DIAGNOSIS — R94.31 ABNORMAL EKG: ICD-10-CM

## 2022-10-28 DIAGNOSIS — N18.31 STAGE 3A CHRONIC KIDNEY DISEASE: ICD-10-CM

## 2022-10-28 DIAGNOSIS — E78.2 MIXED HYPERLIPIDEMIA: ICD-10-CM

## 2022-10-28 DIAGNOSIS — I65.23 ATHEROSCLEROSIS OF BOTH CAROTID ARTERIES: ICD-10-CM

## 2022-10-28 DIAGNOSIS — I10 ESSENTIAL HYPERTENSION: ICD-10-CM

## 2022-10-28 DIAGNOSIS — I48.11 LONGSTANDING PERSISTENT ATRIAL FIBRILLATION: ICD-10-CM

## 2022-10-28 DIAGNOSIS — Z79.01 LONG TERM (CURRENT) USE OF ANTICOAGULANTS: ICD-10-CM

## 2022-10-28 PROCEDURE — 4010F PR ACE/ARB THEARPY RXD/TAKEN: ICD-10-PCS | Mod: CPTII,S$GLB,, | Performed by: INTERNAL MEDICINE

## 2022-10-28 PROCEDURE — 3079F DIAST BP 80-89 MM HG: CPT | Mod: CPTII,S$GLB,, | Performed by: INTERNAL MEDICINE

## 2022-10-28 PROCEDURE — 3044F HG A1C LEVEL LT 7.0%: CPT | Mod: CPTII,S$GLB,, | Performed by: INTERNAL MEDICINE

## 2022-10-28 PROCEDURE — 1160F PR REVIEW ALL MEDS BY PRESCRIBER/CLIN PHARMACIST DOCUMENTED: ICD-10-PCS | Mod: CPTII,S$GLB,, | Performed by: INTERNAL MEDICINE

## 2022-10-28 PROCEDURE — 3066F PR DOCUMENTATION OF TREATMENT FOR NEPHROPATHY: ICD-10-PCS | Mod: CPTII,S$GLB,, | Performed by: INTERNAL MEDICINE

## 2022-10-28 PROCEDURE — 3072F LOW RISK FOR RETINOPATHY: CPT | Mod: CPTII,S$GLB,, | Performed by: INTERNAL MEDICINE

## 2022-10-28 PROCEDURE — 99499 RISK ADDL DX/OHS AUDIT: ICD-10-PCS | Mod: S$GLB,,, | Performed by: INTERNAL MEDICINE

## 2022-10-28 PROCEDURE — 3288F FALL RISK ASSESSMENT DOCD: CPT | Mod: CPTII,S$GLB,, | Performed by: INTERNAL MEDICINE

## 2022-10-28 PROCEDURE — 3288F PR FALLS RISK ASSESSMENT DOCUMENTED: ICD-10-PCS | Mod: CPTII,S$GLB,, | Performed by: INTERNAL MEDICINE

## 2022-10-28 PROCEDURE — 4010F ACE/ARB THERAPY RXD/TAKEN: CPT | Mod: CPTII,S$GLB,, | Performed by: INTERNAL MEDICINE

## 2022-10-28 PROCEDURE — 3066F NEPHROPATHY DOC TX: CPT | Mod: CPTII,S$GLB,, | Performed by: INTERNAL MEDICINE

## 2022-10-28 PROCEDURE — 3079F PR MOST RECENT DIASTOLIC BLOOD PRESSURE 80-89 MM HG: ICD-10-PCS | Mod: CPTII,S$GLB,, | Performed by: INTERNAL MEDICINE

## 2022-10-28 PROCEDURE — 3077F PR MOST RECENT SYSTOLIC BLOOD PRESSURE >= 140 MM HG: ICD-10-PCS | Mod: CPTII,S$GLB,, | Performed by: INTERNAL MEDICINE

## 2022-10-28 PROCEDURE — 3060F PR POS MICROALBUMINURIA RESULT DOCUMENTED/REVIEW: ICD-10-PCS | Mod: CPTII,S$GLB,, | Performed by: INTERNAL MEDICINE

## 2022-10-28 PROCEDURE — 1159F MED LIST DOCD IN RCRD: CPT | Mod: CPTII,S$GLB,, | Performed by: INTERNAL MEDICINE

## 2022-10-28 PROCEDURE — 3077F SYST BP >= 140 MM HG: CPT | Mod: CPTII,S$GLB,, | Performed by: INTERNAL MEDICINE

## 2022-10-28 PROCEDURE — 3044F PR MOST RECENT HEMOGLOBIN A1C LEVEL <7.0%: ICD-10-PCS | Mod: CPTII,S$GLB,, | Performed by: INTERNAL MEDICINE

## 2022-10-28 PROCEDURE — 99215 PR OFFICE/OUTPT VISIT, EST, LEVL V, 40-54 MIN: ICD-10-PCS | Mod: S$GLB,,, | Performed by: INTERNAL MEDICINE

## 2022-10-28 PROCEDURE — 3072F PR LOW RISK FOR RETINOPATHY: ICD-10-PCS | Mod: CPTII,S$GLB,, | Performed by: INTERNAL MEDICINE

## 2022-10-28 PROCEDURE — 1101F PR PT FALLS ASSESS DOC 0-1 FALLS W/OUT INJ PAST YR: ICD-10-PCS | Mod: CPTII,S$GLB,, | Performed by: INTERNAL MEDICINE

## 2022-10-28 PROCEDURE — 1160F RVW MEDS BY RX/DR IN RCRD: CPT | Mod: CPTII,S$GLB,, | Performed by: INTERNAL MEDICINE

## 2022-10-28 PROCEDURE — 99999 PR PBB SHADOW E&M-EST. PATIENT-LVL III: CPT | Mod: PBBFAC,,, | Performed by: INTERNAL MEDICINE

## 2022-10-28 PROCEDURE — 1101F PT FALLS ASSESS-DOCD LE1/YR: CPT | Mod: CPTII,S$GLB,, | Performed by: INTERNAL MEDICINE

## 2022-10-28 PROCEDURE — 3060F POS MICROALBUMINURIA REV: CPT | Mod: CPTII,S$GLB,, | Performed by: INTERNAL MEDICINE

## 2022-10-28 PROCEDURE — 1125F PR PAIN SEVERITY QUANTIFIED, PAIN PRESENT: ICD-10-PCS | Mod: CPTII,S$GLB,, | Performed by: INTERNAL MEDICINE

## 2022-10-28 PROCEDURE — 1125F AMNT PAIN NOTED PAIN PRSNT: CPT | Mod: CPTII,S$GLB,, | Performed by: INTERNAL MEDICINE

## 2022-10-28 PROCEDURE — 99215 OFFICE O/P EST HI 40 MIN: CPT | Mod: S$GLB,,, | Performed by: INTERNAL MEDICINE

## 2022-10-28 PROCEDURE — 99499 UNLISTED E&M SERVICE: CPT | Mod: S$GLB,,, | Performed by: INTERNAL MEDICINE

## 2022-10-28 PROCEDURE — 99999 PR PBB SHADOW E&M-EST. PATIENT-LVL III: ICD-10-PCS | Mod: PBBFAC,,, | Performed by: INTERNAL MEDICINE

## 2022-10-28 PROCEDURE — 1159F PR MEDICATION LIST DOCUMENTED IN MEDICAL RECORD: ICD-10-PCS | Mod: CPTII,S$GLB,, | Performed by: INTERNAL MEDICINE

## 2022-10-28 RX ORDER — HYDRALAZINE HYDROCHLORIDE 50 MG/1
50 TABLET, FILM COATED ORAL 2 TIMES DAILY
Qty: 180 TABLET | Refills: 3 | Status: SHIPPED | OUTPATIENT
Start: 2022-10-28 | End: 2023-10-16 | Stop reason: SDUPTHER

## 2022-10-28 NOTE — PROGRESS NOTES
CARDIOVASCULAR PROGRESS NOTE    REASON FOR CONSULT:   Driss Hernandez Jr. is a 72 y.o. male who presents for follow up of CAD, HFpEF, Chr AF.    PCP: Ehrensing  Ortho: [facility]  HISTORY OF PRESENT ILLNESS:   The patient returns for follow-up.  He reports generally stable status without angina, dyspnea, palpitations, or syncope.  There has been no PND, orthopnea, or lower extremity edema.  Denies melena, hematuria, or claudicant symptoms.  Unfortunately, appears his creatinine jerald with hyperkalemia on losartan.  I plan to discontinue losartan and resume hydralazine 50 mg b.i.d..    CARDIOVASCULAR HISTORY:   CAD 2016 CABG x3 (LIMA-LAD, SVG-D, SVG-PDA)   11/11/20 cath with diffuse native disease, patent SVG x2, LIMA-LAD atretic   12/10/20: PCI OM2 2.25x26 [facility] [place]    HFpEF    PAD    Chr AF on eliquis 5mg bid    PAST MEDICAL HISTORY:     Past Medical History:   Diagnosis Date    Chronic midline low back pain without sciatica 10/2/2017    Colon polyps     Coronary artery disease involving native coronary artery of native heart 3/5/2020    Coronary artery disease involving native coronary artery of native heart with angina pectoris 12/10/2020    Diabetes mellitus with neurological manifestations, uncontrolled 1/24/2017    Diabetic polyneuropathy associated with type 2 diabetes mellitus 1/24/2017    Diabetic polyneuropathy associated with type 2 diabetes mellitus     Essential hypertension 1/24/2017    Gastroesophageal reflux disease 1/24/2017    Hyperlipidemia 1/24/2017    Insomnia 1/24/2017    Long-term insulin use 1/24/2017    Longstanding persistent atrial fibrillation 12/30/2020    Lumbar spondylosis 11/13/2017    Nuclear sclerosis of both eyes 8/24/2017    Obesity     PAD (peripheral artery disease) 3/23/2022    Stage 3 chronic kidney disease 2/13/2019    Uncontrolled type 2 diabetes mellitus without complication, with long-term current use of insulin 1/24/2017       PAST SURGICAL HISTORY:     Past  Surgical History:   Procedure Laterality Date    ARTHROSCOPIC REPAIR OF ROTATOR CUFF OF SHOULDER Right 7/5/2022    Procedure: REPAIR, ROTATOR CUFF, ARTHROSCOPIC;  Surgeon: Valerie Olivera MD;  Location: [facility] OR;  Service: Orthopedics;  Laterality: Right;  HETAL LEMUS [phone]/ TEXTED ALLAN ON 6/23/2022 @ 9:47AM. ALLAN RESPONDED ON 6/23/2022 @ 10:33AM-BREANNA BABIN [phone] MY BE HERE FOR CASE SPOKE TO HIM @ 9:59AM ON 7-1-2022  RN PREOP 6/28/2022    BACK SURGERY      2002    CATARACT EXTRACTION W/  INTRAOCULAR LENS IMPLANT Right 08/29/2017    Dr. Hong    CATARACT EXTRACTION W/  INTRAOCULAR LENS IMPLANT Left 02/24/2022    Dr. Hong    COLONOSCOPY N/A 2/21/2017    Procedure: COLONOSCOPY;  Surgeon: Robb Rosado MD;  Location: [facility] ENDO;  Service: Endoscopy;  Laterality: N/A;    CORONARY ANGIOGRAPHY INCLUDING BYPASS GRAFTS WITH CATHETERIZATION OF LEFT HEART N/A 11/11/2020    Procedure: ANGIOGRAM, CORONARY, INCLUDING BYPASS GRAFT, WITH LEFT HEART CATHETERIZATION;  Surgeon: Lane Cuellar MD;  Location: [facility] CATH LAB;  Service: Cardiology;  Laterality: N/A;  RN PRE OP 11-5-2020. --COVID NEGATIVE ON  11-. C A    CORONARY ARTERY BYPASS GRAFT      March 2016    EPIDURAL STEROID INJECTION Bilateral 11/14/2018    Procedure: Lumbar Medial Branch Blocks;  Surgeon: Eze Byrd Jr., MD;  Location: [facility] ENDO;  Service: Pain Management;  Laterality: Bilateral;  Bilateral Lumbar Medial Branch Blocks L2, L3, L4, L5    [id number]  [id number]    Arrive @ 1150; NO Sedation    EPIDURAL STEROID INJECTION Bilateral 11/28/2018    Procedure: Injection, Steroid, Epidural;  Surgeon: Eze Byrd Jr., MD;  Location: [facility] ENDO;  Service: Pain Management;  Laterality: Bilateral;  Bilateral Sacroiliac Joint Steroid Injections    00420    Arrive @ 0910    EPIDURAL STEROID INJECTION Bilateral 3/20/2019    Procedure: Injection, Steroid, Sacroiliiac Joint;  Surgeon: Eze Byrd Jr., MD;   "Location: [facility] ENDO;  Service: Pain Management;  Laterality: Bilateral;  Bilateral Sacroiliac Joint Steroid Injections    61964    Arrive @ 1145; Trigger point injections also?    INTRAOCULAR PROSTHESES INSERTION Left 2/24/2022    Procedure: INSERTION, IOL PROSTHESIS;  Surgeon: Nickolas Hong MD;  Location: [facility] OR;  Service: Ophthalmology;  Laterality: Left;    PHACOEMULSIFICATION OF CATARACT Left 2/24/2022    Procedure: PHACOEMULSIFICATION, CATARACT;  Surgeon: Nickolas Hong MD;  Location: [facility] OR;  Service: Ophthalmology;  Laterality: Left;  RN Phone Pre Op 2-16-22.  Covid NEGATIVE  2-23-22.  Arrival 08:00 am.    TONSILLECTOMY         ALLERGIES AND MEDICATION:     Review of patient's allergies indicates:   Allergen Reactions    Penicillins Other (See Comments)     Unknown reaction, Had a reaction as a child    Shrimp Itching     Hand itching        Medication List            Accurate as of October 28, 2022  9:28 AM. If you have any questions, ask your nurse or doctor.                CONTINUE taking these medications      ACCU-CHEK MOIZ CONTROL SOLN Soln  Generic drug: blood glucose control high,low     ACCU-CHEK MOIZ PLUS TEST STRP Strp  Generic drug: blood sugar diagnostic  TEST THREE TIMES DAILY     ACCU-CHEK SOFTCLIX LANCETS Misc  Generic drug: lancets     albuterol 90 mcg/actuation inhaler  Commonly known as: PROVENTIL/VENTOLIN HFA  Inhale 2 puffs into the lungs every 4 (four) hours as needed for Shortness of Breath. Rescue     ascorbic acid (vitamin C) 100 MG tablet  Commonly known as: VITAMIN C     atorvastatin 80 MG tablet  Commonly known as: LIPITOR  Take 1 tablet (80 mg total) by mouth every evening.     b complex vitamins tablet     BD ALCOHOL SWABS Padm  Generic drug: alcohol swabs     BD ULTRA-FINE ROLAND PEN NEEDLE 32 gauge x 5/32" Ndle  Generic drug: pen needle, diabetic     celecoxib 100 MG capsule  Commonly known as: CeleBREX  Take 1 capsule (100 mg total) by mouth once daily.   "   clopidogreL 75 mg tablet  Commonly known as: PLAVIX  Take 1 tablet (75 mg total) by mouth once daily.     coenzyme Q10 100 mg capsule     dapagliflozin 5 mg Tab tablet  Commonly known as: FARXIGA  Take 1 tablet (5 mg total) by mouth once daily.     ELIQUIS 5 mg Tab  Generic drug: apixaban  TAKE 1 TABLET TWICE DAILY     ergocalciferol 50,000 unit Cap  Commonly known as: VITAMIN D2  TAKE ONE CAPSULE BY MOUTH WEEKLY     fenofibrate 160 MG Tab  TAKE 1 TABLET EVERY MORNING     furosemide 20 MG tablet  Commonly known as: LASIX  Take 1 tablet (20 mg total) by mouth 2 (two) times daily.     gabapentin 100 MG capsule  Commonly known as: NEURONTIN  TAKE 2 CAPSULES EVERY MORNING AND TAKE 3 CAPSULES EVERY DAY WITH DINNER     glimepiride 2 MG tablet  Commonly known as: AMARYL  TAKE 1 TABLET EVERY DAY BEFORE BREAKFAST     HYDROcodone-acetaminophen 5-325 mg per tablet  Commonly known as: NORCO  Take 1 tablet by mouth every 8 (eight) hours as needed for Pain.     losartan 50 MG tablet  Commonly known as: COZAAR  Take 1 tablet (50 mg total) by mouth once daily.     magnesium 250 mg Tab     metFORMIN 500 MG ER 24hr tablet  Commonly known as: GLUCOPHAGE-XR  Take 2 tablets twice daily with food     nitroGLYCERIN 0.4 MG SL tablet  Commonly known as: NITROSTAT  Place 1 tablet (0.4 mg total) under the tongue every 5 (five) minutes as needed for Chest pain.     omega-3 acid ethyl esters 1 gram capsule  Commonly known as: LOVAZA  Take 2 capsules (2 g total) by mouth 2 (two) times daily.     ondansetron 4 MG Tbdl  Commonly known as: ZOFRAN-ODT  Dissolve 1 tablet (4 mg total) by mouth every 6 (six) hours as needed (nausea).     pantoprazole 40 MG tablet  Commonly known as: PROTONIX  TAKE 1 TABLET EVERY DAY     tiZANidine 4 MG tablet  Commonly known as: ZANAFLEX     triazolam 0.25 MG Tab  Commonly known as: HALCION  TAKE 1 TABLET BY MOUTH EVERY NIGHT AT BEDTIME AS NEEDED              SOCIAL HISTORY:     Social History     Socioeconomic  History    Marital status:    Tobacco Use    Smoking status: Never    Smokeless tobacco: Never   Substance and Sexual Activity    Alcohol use: Yes     Comment: rare occassion    Drug use: No    Sexual activity: Yes     Partners: Female     Social Determinants of Health     Financial Resource Strain: Low Risk     Difficulty of Paying Living Expenses: Not hard at all   Food Insecurity: No Food Insecurity    Worried About Running Out of Food in the Last Year: Never true    Ran Out of Food in the Last Year: Never true   Transportation Needs: No Transportation Needs    Lack of Transportation (Medical): No    Lack of Transportation (Non-Medical): No   Physical Activity: Insufficiently Active    Days of Exercise per Week: 3 days    Minutes of Exercise per Session: 20 min   Stress: No Stress Concern Present    Feeling of Stress : Only a little   Social Connections: Unknown    Frequency of Communication with Friends and Family: More than three times a week    Frequency of Social Gatherings with Friends and Family: More than three times a week    Active Member of Clubs or Organizations: Yes    Attends Club or Organization Meetings: More than 4 times per year    Marital Status:    Housing Stability: Low Risk     Unable to Pay for Housing in the Last Year: No    Number of Places Lived in the Last Year: 1    Unstable Housing in the Last Year: No       FAMILY HISTORY:     Family History   Problem Relation Age of Onset    Depression Mother     Heart disease Mother     Stroke Father     No Known Problems Sister     Diabetes Sister     No Known Problems Sister     Blindness Brother     No Known Problems Maternal Aunt     No Known Problems Maternal Uncle     No Known Problems Paternal Aunt     No Known Problems Paternal Uncle     No Known Problems Maternal Grandmother     No Known Problems Maternal Grandfather     No Known Problems Paternal Grandmother     No Known Problems Paternal Grandfather     Amblyopia Neg Hx      "Cancer Neg Hx     Cataracts Neg Hx     Glaucoma Neg Hx     Hypertension Neg Hx     Macular degeneration Neg Hx     Retinal detachment Neg Hx     Strabismus Neg Hx     Thyroid disease Neg Hx        REVIEW OF SYSTEMS:   Review of Systems   Constitutional:  Negative for chills, diaphoresis and fever.   HENT:  Negative for nosebleeds.    Eyes:  Negative for blurred vision, double vision and photophobia.   Respiratory:  Negative for cough, hemoptysis, shortness of breath and wheezing.    Cardiovascular:  Negative for chest pain, palpitations, orthopnea, claudication, leg swelling and PND.   Gastrointestinal:  Negative for abdominal pain, blood in stool, heartburn, melena, nausea and vomiting.   Genitourinary:  Negative for flank pain and hematuria.   Musculoskeletal:  Positive for joint pain (R shoulder). Negative for falls, myalgias and neck pain.   Skin:  Negative for rash.   Neurological:  Negative for dizziness, seizures, loss of consciousness, weakness and headaches.   Endo/Heme/Allergies:  Negative for polydipsia. Does not bruise/bleed easily.   Psychiatric/Behavioral:  Negative for depression and memory loss. The patient is not nervous/anxious.      PHYSICAL EXAM:     Vitals:    10/28/22 0924   BP: (!) 159/80   Pulse: (!) 54   Resp: 18    Body mass index is 28.5 kg/m².  Weight: 82.6 kg (181 lb 15.8 oz)   Height: 5' 7" (170.2 cm)     Physical Exam  Vitals reviewed.   Constitutional:       General: He is not in acute distress.     Appearance: Normal appearance. He is well-developed. He is not ill-appearing, toxic-appearing or diaphoretic.   HENT:      Head: Normocephalic and atraumatic.   Eyes:      General: No scleral icterus.     Extraocular Movements: Extraocular movements intact.      Conjunctiva/sclera: Conjunctivae normal.      Pupils: Pupils are equal, round, and reactive to light.   Neck:      Thyroid: No thyromegaly.      Vascular: Normal carotid pulses. No carotid bruit or JVD.      Trachea: Trachea " normal.   Cardiovascular:      Rate and Rhythm: Bradycardia present. Rhythm irregularly irregular.      Heart sounds: S1 normal and S2 normal. Heart sounds are distant. No murmur heard.    No friction rub. No gallop.   Pulmonary:      Effort: Pulmonary effort is normal. No respiratory distress.      Breath sounds: Normal breath sounds. No stridor. No wheezing, rhonchi or rales.   Chest:      Chest wall: No tenderness.   Abdominal:      General: There is no distension.      Palpations: Abdomen is soft.   Musculoskeletal:         General: No swelling or tenderness.      Cervical back: Normal range of motion and neck supple. No edema or rigidity.      Right lower leg: No edema.      Left lower leg: No edema.      Comments: R shoulder limited ROM   Feet:      Right foot:      Skin integrity: No ulcer.      Left foot:      Skin integrity: No ulcer.   Skin:     General: Skin is warm and dry.      Coloration: Skin is not jaundiced.   Neurological:      General: No focal deficit present.      Mental Status: He is alert and oriented to person, place, and time.      Cranial Nerves: No cranial nerve deficit.   Psychiatric:         Mood and Affect: Mood normal.         Speech: Speech normal.         Behavior: Behavior normal. Behavior is cooperative.       DATA:   EKG: (personally reviewed tracing(s))  9/22/22 AF 54, NSSTTW changes, similar to 3/23/22    Laboratory:  CBC:  Recent Labs   Lab 12/07/20  1200 03/30/22  0908 06/09/22  0826   WBC 5.82 4.72 5.56   Hemoglobin 10.6 L 9.5 L 10.8 L   Hematocrit 33.8 L 32.0 L 36.7 L   Platelets 231 188 319         CHEMISTRIES:  Recent Labs   Lab 04/28/22  0923 05/11/22  0722 06/09/22  0826 08/11/22  0931 09/13/22  0658 10/05/22  1116   Glucose 146 H  --  126 H  --  99 267 H   Sodium 141  --  143  --  140 136   Potassium 4.9  --  4.9  --  4.5 5.7 H   BUN 30 H  --  33 H  --  26 H 52 H   Creatinine 1.5 H 1.5 H 1.5 H 1.5 H 1.5 H 1.8 H   eGFR if  53 A 53.0 A 53.0 A  --   --   --     eGFR if non  46 A 45.9 A 45.9 A  --   --   --    Calcium 9.7  --  10.2  --  9.9 10.6 H         CARDIAC BIOMARKERS:        COAGS:  Recent Labs   Lab 04/01/21  0033 04/21/21  0921 05/10/21  0945   INR 2.4 H 2.0 H 2.9 H         LIPIDS/LFTS:  Recent Labs   Lab 08/18/20  0928 02/10/22  1014 05/11/22  0722 06/09/22  0826 09/13/22  0658   Cholesterol 137 163 123  --  133   Triglycerides 125 123 116  --  144   HDL 25 L 27 L 22 L  --  25 L   LDL Cholesterol 87.0 111.4 77.8  --  79.2   Non-HDL Cholesterol 112 136 101  --  108   AST 18  --   --  19 18   ALT 18  --   --  13 12         Cardiovascular Testing:  Carotid US 9/13/22  There is 40-49% right Internal Carotid Stenosis.  There is 60-69% left Internal Carotid Stenosis.  >50% R ECA stenosis.  Similar ICA findings noted on report 3/30/22.    LE venous US 4/20/22  There is no evidence of a right lower extremity DVT.  There is no evidence of a left lower extremity DVT.  1.2x0.6cm non vascular structure noted in left proximal calf.    Echo 3/30/22  The left ventricle is normal in size with mild concentric hypertrophy and normal systolic function.  The estimated ejection fraction is 60%.  Mild right ventricular enlargement with low normal right ventricular systolic function.  Mild mitral regurgitation.  Mild tricuspid regurgitation.  The estimated PA systolic pressure is 22 mmHg.  Atrial fibrillation observed.    Holter 5/19/21  Atrial fibrillation. Heart rates varied between 31 and 112 bpm with an average of 57 bpm.  There were rare PVCs totalling 100 and averaging 4.17 per hour.  Longest RR interval 3.3 seconds    L MPI 5/19/21    Abnormal myocardial perfusion scan.    There is a mild intensity, fixed defect consistent with scar in the anterior wall.    There is a second moderate intensity, reversible defect that is consistent with ischemia in the inferior wall.    The gated perfusion images showed an ejection fraction of 61% post stress.    The EKG portion  of this study is negative for ischemia.    The patient reported no chest pain during the stress test.    There were no arrhythmias during stress.    PCI OM1 12/10/20 (images personally reviewed and interpreted)  1.  90% mid OM2 stenosis.  2.  Successful PCI with placement of a 2.25 x 26 mm drug-eluting stent reducing the 90% stenosis to 0%.  INGRIS 3 flow.  No dissection.  Good angiographic results.    Cath 11/11/20 (images personally reviewed and interpreted)  Left Main   The vessel is angiographically normal.   Left Anterior Descending   Subtotal mid occlusion. Diffusely diseased distal vessel   Left Circumflex   Long mid 80% between OM1 and OM2   First Obtuse Marginal Branch   90% mid   Second Obtuse Marginal Branch   80% mid   Right Coronary Artery   80% mid - moderate disffuse distal disease   LIMA Graft To Mid LAD   Mid to distal graft diffusely diseased 0 multile 80-90% lesions. Small diffusely diseased distal LAD   Saphenous Graft To RPDA   Patent with good runoff to PDA   Graft To 1st Diag   Patent to D1 with good runoff     Ex NUSRAT 4/18/18  Resting NUSRAT:   The right ankle brachial index was 1.04 which is normal.   The left ankle brachial index was 1.08 which is normal.   The right TBI is 0.55.   The left TBI is 0.98.   Exercise NUSRAT:   Post exercise right ankle pressure was 113 mmHg, resulting in a right post-exercise NUSRAT of 0.67.   Post exercise left ankle pressure was 150 mmHg, resulting in a left post-exercise NUSRAT of 0.89.       ASSESSMENT:   # CAD s/p CABG/PCI OM 12/2020, asymptomatic  # HTN, uncontrolled  # HFpEF, euvolemic  # CKD3a, elev creat/K+ with losartan  # Chr AF on eliquis 5mg bid, HR controlled  # HLP on atorva 80mg  # PAD, abnl NUSRAT 4/18/18  # carotid atherosclerosis (CUS 9/2022)  # L>R LE edema, resolved.  LE venous US 4/2022 neg.  # aortic atherosclerosis (CT Chest 10/29/18)    PLAN:   Cont med rx  Cont Plavix 75mg qd  Cont eliquis 5mg bid  Stop losartan  Resume hydrala 50mg bid, titrate as  needed  Re-enrolled in digital htn program (again)  Check BMP 1 week  RN BP check 2 weeks (Ehrensing)  RTC 6 months with surveillance MPI and carotid US (Apr 2023)      Eze Purdy MD, FACC

## 2022-11-01 ENCOUNTER — CLINICAL SUPPORT (OUTPATIENT)
Dept: REHABILITATION | Facility: HOSPITAL | Age: 72
End: 2022-11-01
Payer: MEDICARE

## 2022-11-01 DIAGNOSIS — R29.898 IMPAIRED STRENGTH OF SHOULDER MUSCLES: ICD-10-CM

## 2022-11-01 DIAGNOSIS — M25.611 DECREASED RANGE OF MOTION OF RIGHT SHOULDER: Primary | ICD-10-CM

## 2022-11-01 PROCEDURE — 97110 THERAPEUTIC EXERCISES: CPT | Mod: PN

## 2022-11-01 NOTE — PROGRESS NOTES
[facility]   Physical Therapy Daily Treatment Note     Name: Driss Hernandez Jr.  Clinic Number: [id number]    Therapy Diagnosis:   Encounter Diagnoses   Name Primary?    Decreased range of motion of right shoulder Yes    Impaired strength of shoulder muscles          Physician: Allison Hemphill PA-C    Visit Date: 11/1/2022       Physician Orders: PT Eval and Treat   Medical Diagnosis from Referral: M75.112 (ICD-10-CM) - Nontraumatic incomplete tear of left rotator cuff  Evaluation Date: 7/8/2022  Plan of Care Expiration: 12/31/2022    Authorization Period Expiration: 7/5/23  Visit # / Visits authorized: 11/ 20    Precautions: Standard, s/p Right partial thickness RTC repair with biceps tenodesis     POSTOPERATIVE PLAN: Plan to follow [facility] rehab protocol + biceps tenodesis. AROM of elbow ok, no resistive elbow exercises until 8     DOS: 7-5-22         POW: 14 weeks    Time In: 0815  Time Out: 0910    Total Billable Time: 40 minutes      SUBJECTIVE     Pt reports: shoulder is improving with therapy. States he still gets moderate pain with certain movements.     He was somewhat compliant with home exercise program.  Response to previous treatment: fair  Functional change: improvements with ADL's.     Pain: 3/10  Location: right shoulder       OBJECTIVE     Objective Measures updated at progress report unless specified.      TREATMENT     Driss received the treatments listed below:      received therapeutic exercises to develop strength and ROM for 45 minutes including:     UBE x 6 min  ER/IR YTB 2 x 10: np  Wall slides with pillow case x 2 x 10  supine wand ER x 15 5lbs  Supine wand press-up 3 x 10 5 lbs  Supine wand flexion x 10  Sidelying shoulder flexion to 90deg 2 x 10  Bilateral shoulder ext/rows RTB 3 x 10  Sidleying ER 2lb 2 x 10  Supine horiz abd RTB 3 x 10  Bicep curls 3 x 10 GTB  1lbs ball on wall, wax on, wax off x 30    received the following manual therapy techniques:  Soft tissue Mobilization were applied to the: R shoulder for 10 minutes, including:  PROM GHJ in supine - all planes; gr III GHJ posterior/inferior mobs,    GHJ oscillation;     CP post treatment x 10 right shoulder post treatment.     PATIENT EDUCATION AND HOME EXERCISES     Education provided:   - postural awareness  - reducing compensations from UT/LS     Written Home Exercises Provided: yes.     Exercises were reviewed and Driss was able to demonstrate them prior to the end of the session.  Driss demonstrated good  understanding of the education provided.      See EMR under Patient Instructions for exercises provided 7/8/2022.    ASSESSMENT     Pt continues to demonstrate moderate limitations in Active GH functional range of motion.  Improved tolerance to exercise progression today consisting of sub-maximal stabilization and AAROM activities. No c/o increased discomfort with prescribed activities.  Driss Is progressing fair towards his goals.     Pt prognosis is Good.     Pt will continue to benefit from skilled outpatient physical therapy to address the deficits listed in the problem list box on initial evaluation, provide pt/family education and to maximize pt's level of independence in the home and community environment.     Pt's spiritual, cultural and educational needs considered and pt agreeable to plan of care and goals.     Anticipated barriers to physical therapy: none    Short Term Goals (8 Weeks):  Updated  10/25/22 Partially MET     1.Pt to increase strength by a 1/2 grade of muscles test to allow for improvement in functional activities such as performing chores.  MET  2.Pt to improve range of motion by 50% to allow for improved functional mobility to allow for improvement in IADLs.   3.Pt to report compliance with HEP and demonstrate proper exercise technique to PT to show competence with self management of condition.  4.Decrease pain by 50% during functional activities.  MET     Long Term Goals (16  Weeks):   In progress     1. Increase ROM to allow improved joint biomechanics during functional activities.   2.Increase trunk and lower extremity strength to within normal limits during functional activities.   3. Independent with home exercise program.   4. Full return to functional activities with manageable complaints.  5. Patient to demonstrate improved posture and body mechanics.  6. Decrease pain by 75% during functional activities.    PLAN     Continue with established  PT Plan of Care towards patient goals.  Progress functional strengthening next visit.      Steve Joyce, PT  11/01/2022

## 2022-11-10 ENCOUNTER — OFFICE VISIT (OUTPATIENT)
Dept: ORTHOPEDICS | Facility: CLINIC | Age: 72
End: 2022-11-10
Payer: MEDICARE

## 2022-11-10 DIAGNOSIS — M75.111 NONTRAUMATIC INCOMPLETE TEAR OF RIGHT ROTATOR CUFF: Primary | ICD-10-CM

## 2022-11-10 PROCEDURE — 20610 LARGE JOINT ASPIRATION/INJECTION: R SUBACROMIAL BURSA: ICD-10-PCS | Mod: RT,S$GLB,, | Performed by: ORTHOPAEDIC SURGERY

## 2022-11-10 PROCEDURE — 1101F PR PT FALLS ASSESS DOC 0-1 FALLS W/OUT INJ PAST YR: ICD-10-PCS | Mod: CPTII,S$GLB,, | Performed by: ORTHOPAEDIC SURGERY

## 2022-11-10 PROCEDURE — 3072F LOW RISK FOR RETINOPATHY: CPT | Mod: CPTII,S$GLB,, | Performed by: ORTHOPAEDIC SURGERY

## 2022-11-10 PROCEDURE — 3066F PR DOCUMENTATION OF TREATMENT FOR NEPHROPATHY: ICD-10-PCS | Mod: CPTII,S$GLB,, | Performed by: ORTHOPAEDIC SURGERY

## 2022-11-10 PROCEDURE — 1160F RVW MEDS BY RX/DR IN RCRD: CPT | Mod: CPTII,S$GLB,, | Performed by: ORTHOPAEDIC SURGERY

## 2022-11-10 PROCEDURE — 3066F NEPHROPATHY DOC TX: CPT | Mod: CPTII,S$GLB,, | Performed by: ORTHOPAEDIC SURGERY

## 2022-11-10 PROCEDURE — 1101F PT FALLS ASSESS-DOCD LE1/YR: CPT | Mod: CPTII,S$GLB,, | Performed by: ORTHOPAEDIC SURGERY

## 2022-11-10 PROCEDURE — 99999 PR PBB SHADOW E&M-EST. PATIENT-LVL II: ICD-10-PCS | Mod: PBBFAC,,, | Performed by: ORTHOPAEDIC SURGERY

## 2022-11-10 PROCEDURE — 3288F FALL RISK ASSESSMENT DOCD: CPT | Mod: CPTII,S$GLB,, | Performed by: ORTHOPAEDIC SURGERY

## 2022-11-10 PROCEDURE — 3060F PR POS MICROALBUMINURIA RESULT DOCUMENTED/REVIEW: ICD-10-PCS | Mod: CPTII,S$GLB,, | Performed by: ORTHOPAEDIC SURGERY

## 2022-11-10 PROCEDURE — 99213 PR OFFICE/OUTPT VISIT, EST, LEVL III, 20-29 MIN: ICD-10-PCS | Mod: 25,S$GLB,, | Performed by: ORTHOPAEDIC SURGERY

## 2022-11-10 PROCEDURE — 3044F HG A1C LEVEL LT 7.0%: CPT | Mod: CPTII,S$GLB,, | Performed by: ORTHOPAEDIC SURGERY

## 2022-11-10 PROCEDURE — 1160F PR REVIEW ALL MEDS BY PRESCRIBER/CLIN PHARMACIST DOCUMENTED: ICD-10-PCS | Mod: CPTII,S$GLB,, | Performed by: ORTHOPAEDIC SURGERY

## 2022-11-10 PROCEDURE — 99213 OFFICE O/P EST LOW 20 MIN: CPT | Mod: 25,S$GLB,, | Performed by: ORTHOPAEDIC SURGERY

## 2022-11-10 PROCEDURE — 4010F ACE/ARB THERAPY RXD/TAKEN: CPT | Mod: CPTII,S$GLB,, | Performed by: ORTHOPAEDIC SURGERY

## 2022-11-10 PROCEDURE — 99999 PR PBB SHADOW E&M-EST. PATIENT-LVL II: CPT | Mod: PBBFAC,,, | Performed by: ORTHOPAEDIC SURGERY

## 2022-11-10 PROCEDURE — 3072F PR LOW RISK FOR RETINOPATHY: ICD-10-PCS | Mod: CPTII,S$GLB,, | Performed by: ORTHOPAEDIC SURGERY

## 2022-11-10 PROCEDURE — 3288F PR FALLS RISK ASSESSMENT DOCUMENTED: ICD-10-PCS | Mod: CPTII,S$GLB,, | Performed by: ORTHOPAEDIC SURGERY

## 2022-11-10 PROCEDURE — 3044F PR MOST RECENT HEMOGLOBIN A1C LEVEL <7.0%: ICD-10-PCS | Mod: CPTII,S$GLB,, | Performed by: ORTHOPAEDIC SURGERY

## 2022-11-10 PROCEDURE — 1159F PR MEDICATION LIST DOCUMENTED IN MEDICAL RECORD: ICD-10-PCS | Mod: CPTII,S$GLB,, | Performed by: ORTHOPAEDIC SURGERY

## 2022-11-10 PROCEDURE — 3060F POS MICROALBUMINURIA REV: CPT | Mod: CPTII,S$GLB,, | Performed by: ORTHOPAEDIC SURGERY

## 2022-11-10 PROCEDURE — 1159F MED LIST DOCD IN RCRD: CPT | Mod: CPTII,S$GLB,, | Performed by: ORTHOPAEDIC SURGERY

## 2022-11-10 PROCEDURE — 4010F PR ACE/ARB THEARPY RXD/TAKEN: ICD-10-PCS | Mod: CPTII,S$GLB,, | Performed by: ORTHOPAEDIC SURGERY

## 2022-11-10 PROCEDURE — 20610 DRAIN/INJ JOINT/BURSA W/O US: CPT | Mod: RT,S$GLB,, | Performed by: ORTHOPAEDIC SURGERY

## 2022-11-10 RX ORDER — TRIAMCINOLONE ACETONIDE 40 MG/ML
40 INJECTION, SUSPENSION INTRA-ARTICULAR; INTRAMUSCULAR
Status: DISCONTINUED | OUTPATIENT
Start: 2022-11-10 | End: 2022-11-10 | Stop reason: HOSPADM

## 2022-11-10 RX ADMIN — TRIAMCINOLONE ACETONIDE 40 MG: 40 INJECTION, SUSPENSION INTRA-ARTICULAR; INTRAMUSCULAR at 09:11

## 2022-11-10 NOTE — PROGRESS NOTES
Postoperative Visit    History of Present Illness:   Driss is 4 months s/p right shoulder ATS, regeneten, arthroscopic biceps tenodesis (DOS-7/5/22)  Pain is better  Does have pain sometimes at night   Is doing PT once per week  Doing HEP      Physical Examination:    Pain today is 3/10  Worst is 8/10  Best is 1/10     NAD  right upper extremity:  Incisions over the shoulder well healed  Active FE to 140  Supine can get to 150 with passive motion  ER in abduction to 30, IR to 10   LTSI m/u/r  2+ RP  + EPL, IO, FDS, FDP     Assessment/Plan:  72 y.o. male 12 weeks s/p right shoulder arthroscopy , rotator cuff repair and arthroscopic biceps tenodesis  (DOS-7/5/22)    Plan  Pain likely due to stiffness - continue PT  Continue tylenol PRN  Focus on gentle stretching, no strengthening or excessive activity for now   RTC 6 weeks    All questions were answered in detail. The patient is in full agreement with the treatment plan and will proceed accordingly.

## 2022-11-10 NOTE — PROCEDURES
Large Joint Aspiration/Injection: R subacromial bursa    Date/Time: 11/10/2022 9:15 AM  Performed by: Valerie Olivera MD  Authorized by: Valerie Olivera MD     Consent Done?:  Yes (Verbal)  Indications:  Pain  Timeout: prior to procedure the correct patient, procedure, and site was verified    Prep: patient was prepped and draped in usual sterile fashion      Local anesthesia used?: Yes    Local anesthetic:  Topical anesthetic    Details:  Needle Size:  22 G  Approach:  Posterior  Location:  Shoulder  Site:  R subacromial bursa  Medications:  40 mg triamcinolone acetonide 40 mg/mL  Patient tolerance:  Patient tolerated the procedure well with no immediate complications

## 2022-11-11 ENCOUNTER — LAB VISIT (OUTPATIENT)
Dept: LAB | Facility: HOSPITAL | Age: 72
End: 2022-11-11
Attending: INTERNAL MEDICINE
Payer: MEDICARE

## 2022-11-11 DIAGNOSIS — I10 ESSENTIAL HYPERTENSION: ICD-10-CM

## 2022-11-11 DIAGNOSIS — N18.31 STAGE 3A CHRONIC KIDNEY DISEASE: ICD-10-CM

## 2022-11-11 LAB
ANION GAP SERPL CALC-SCNC: 11 MMOL/L (ref 8–16)
BUN SERPL-MCNC: 25 MG/DL (ref 8–23)
CALCIUM SERPL-MCNC: 9.5 MG/DL (ref 8.7–10.5)
CHLORIDE SERPL-SCNC: 104 MMOL/L (ref 95–110)
CO2 SERPL-SCNC: 24 MMOL/L (ref 23–29)
CREAT SERPL-MCNC: 1.4 MG/DL (ref 0.5–1.4)
EST. GFR  (NO RACE VARIABLE): 53 ML/MIN/1.73 M^2
GLUCOSE SERPL-MCNC: 196 MG/DL (ref 70–110)
POTASSIUM SERPL-SCNC: 4.9 MMOL/L (ref 3.5–5.1)
SODIUM SERPL-SCNC: 139 MMOL/L (ref 136–145)

## 2022-11-11 PROCEDURE — 80048 BASIC METABOLIC PNL TOTAL CA: CPT | Performed by: INTERNAL MEDICINE

## 2022-11-17 ENCOUNTER — CLINICAL SUPPORT (OUTPATIENT)
Dept: REHABILITATION | Facility: HOSPITAL | Age: 72
End: 2022-11-17
Payer: MEDICARE

## 2022-11-17 DIAGNOSIS — M25.611 DECREASED RANGE OF MOTION OF RIGHT SHOULDER: Primary | ICD-10-CM

## 2022-11-17 DIAGNOSIS — R29.898 IMPAIRED STRENGTH OF SHOULDER MUSCLES: ICD-10-CM

## 2022-11-17 PROCEDURE — 97140 MANUAL THERAPY 1/> REGIONS: CPT | Mod: PN

## 2022-11-17 PROCEDURE — 97110 THERAPEUTIC EXERCISES: CPT | Mod: PN

## 2022-11-17 NOTE — PROGRESS NOTES
[facility]   Physical Therapy Daily Treatment Note     Name: Driss Hernandez Jr.  Clinic Number: [id number]    Therapy Diagnosis:   Encounter Diagnoses   Name Primary?    Decreased range of motion of right shoulder Yes    Impaired strength of shoulder muscles          Physician: Allison Hemphill PA-C    Visit Date: 11/17/2022       Physician Orders: PT Eval and Treat   Medical Diagnosis from Referral: M75.112 (ICD-10-CM) - Nontraumatic incomplete tear of left rotator cuff  Evaluation Date: 7/8/2022  Plan of Care Expiration: 12/31/2022    Authorization Period Expiration: 7/5/23  Visit # / Visits authorized: 11/ 20    Precautions: Standard, s/p Right partial thickness RTC repair with biceps tenodesis     POSTOPERATIVE PLAN: Plan to follow [facility] rehab protocol + biceps tenodesis. AROM of elbow ok, no resistive elbow exercises until 8     DOS: 7-5-22         POW: 14 weeks    Time In: 0815  Time Out: 0910    Total Billable Time: 40 minutes      SUBJECTIVE     Pt reports: shoulder is improving with therapy. States he still gets moderate pain with certain movements.     He was somewhat compliant with home exercise program.  Response to previous treatment: fair  Functional change: improvements with ADL's.     Pain: 3/10  Location: right shoulder       OBJECTIVE     Objective Measures updated at progress report unless specified.      TREATMENT     Driss received the treatments listed below:      received therapeutic exercises to develop strength and ROM for 45 minutes including:     UBE x 6 min  ER/IR green tubing 3 x 10  Wall slides with pillow case x 2 x 10  supine wand ER x 15 5lbs  Supine wand press-up 3 x 10 5 lbs  Supine wand flexion x 10 5lbs  Bilateral shoulder ext/rows RTB 3 x 10  Sidleying ER 2lb 3 x 10  2 lbs ball on wall, wax on, wax off x 30  Wall climb with lift off x 10  Wall clock with YTB 2, 3 o clock 2 x 10    received the following manual therapy techniques: Soft tissue  Mobilization were applied to the: R shoulder for 10 minutes, including:  PROM GHJ in supine - all planes; gr III GHJ posterior/inferior mobs,    GHJ oscillation;     CP post treatment x 10 right shoulder post treatment.     PATIENT EDUCATION AND HOME EXERCISES     Education provided:   - postural awareness  - reducing compensations from UT/LS     Written Home Exercises Provided: yes.     Exercises were reviewed and Driss was able to demonstrate them prior to the end of the session.  Driss demonstrated good  understanding of the education provided.      See EMR under Patient Instructions for exercises provided 7/8/2022.    ASSESSMENT     Pt demonstrates moderate improvements in functional ROM today.  Good tolerance to exercise progression today consisting of GH stabilization and AAROM activities. No c/o increased discomfort with prescribed activities.  Driss Is progressing fair towards his goals.     Pt prognosis is Good.     Pt will continue to benefit from skilled outpatient physical therapy to address the deficits listed in the problem list box on initial evaluation, provide pt/family education and to maximize pt's level of independence in the home and community environment.     Pt's spiritual, cultural and educational needs considered and pt agreeable to plan of care and goals.     Anticipated barriers to physical therapy: none    Short Term Goals (8 Weeks):  Updated  10/25//22 Partially MET     1.Pt to increase strength by a 1/2 grade of muscles test to allow for improvement in functional activities such as performing chores.  MET  2.Pt to improve range of motion by 50% to allow for improved functional mobility to allow for improvement in IADLs.   3.Pt to report compliance with HEP and demonstrate proper exercise technique to PT to show competence with self management of condition.  4.Decrease pain by 50% during functional activities.  MET     Long Term Goals (16 Weeks):   In progress     1. Increase ROM to allow  improved joint biomechanics during functional activities.   2.Increase trunk and lower extremity strength to within normal limits during functional activities.   3. Independent with home exercise program.   4. Full return to functional activities with manageable complaints.  5. Patient to demonstrate improved posture and body mechanics.  6. Decrease pain by 75% during functional activities.    PLAN     Continue with established  PT Plan of Care towards patient goals.  Progress functional strengthening next visit.      Steve Joyce, PT  11/17/2022

## 2022-11-17 NOTE — TELEPHONE ENCOUNTER
Offered pt an appt with MERLIN Vega next Tuesday at 9:010a- he accepted.    Benign essential HTN HLD (hyperlipidemia)

## 2022-11-22 ENCOUNTER — OFFICE VISIT (OUTPATIENT)
Dept: ENDOCRINOLOGY | Facility: CLINIC | Age: 72
End: 2022-11-22
Payer: MEDICARE

## 2022-11-22 VITALS
DIASTOLIC BLOOD PRESSURE: 82 MMHG | WEIGHT: 186 LBS | BODY MASS INDEX: 29.13 KG/M2 | SYSTOLIC BLOOD PRESSURE: 173 MMHG | TEMPERATURE: 98 F | HEART RATE: 56 BPM

## 2022-11-22 DIAGNOSIS — I10 HYPERTENSION, UNSPECIFIED TYPE: ICD-10-CM

## 2022-11-22 DIAGNOSIS — E11.65 TYPE 2 DIABETES MELLITUS WITH HYPERGLYCEMIA, WITH LONG-TERM CURRENT USE OF INSULIN: Primary | ICD-10-CM

## 2022-11-22 DIAGNOSIS — N18.31 STAGE 3A CHRONIC KIDNEY DISEASE: ICD-10-CM

## 2022-11-22 DIAGNOSIS — I25.10 CORONARY ARTERY DISEASE INVOLVING NATIVE CORONARY ARTERY OF NATIVE HEART WITHOUT ANGINA PECTORIS: ICD-10-CM

## 2022-11-22 DIAGNOSIS — Z79.4 TYPE 2 DIABETES MELLITUS WITH HYPERGLYCEMIA, WITH LONG-TERM CURRENT USE OF INSULIN: Primary | ICD-10-CM

## 2022-11-22 PROCEDURE — 3066F PR DOCUMENTATION OF TREATMENT FOR NEPHROPATHY: ICD-10-PCS | Mod: CPTII,S$GLB,, | Performed by: NURSE PRACTITIONER

## 2022-11-22 PROCEDURE — 3051F PR MOST RECENT HEMOGLOBIN A1C LEVEL 7.0 - < 8.0%: ICD-10-PCS | Mod: CPTII,S$GLB,, | Performed by: NURSE PRACTITIONER

## 2022-11-22 PROCEDURE — 3060F POS MICROALBUMINURIA REV: CPT | Mod: CPTII,S$GLB,, | Performed by: NURSE PRACTITIONER

## 2022-11-22 PROCEDURE — 3072F LOW RISK FOR RETINOPATHY: CPT | Mod: CPTII,S$GLB,, | Performed by: NURSE PRACTITIONER

## 2022-11-22 PROCEDURE — 4010F PR ACE/ARB THEARPY RXD/TAKEN: ICD-10-PCS | Mod: CPTII,S$GLB,, | Performed by: NURSE PRACTITIONER

## 2022-11-22 PROCEDURE — 1160F PR REVIEW ALL MEDS BY PRESCRIBER/CLIN PHARMACIST DOCUMENTED: ICD-10-PCS | Mod: CPTII,S$GLB,, | Performed by: NURSE PRACTITIONER

## 2022-11-22 PROCEDURE — 3008F PR BODY MASS INDEX (BMI) DOCUMENTED: ICD-10-PCS | Mod: CPTII,S$GLB,, | Performed by: NURSE PRACTITIONER

## 2022-11-22 PROCEDURE — 3079F PR MOST RECENT DIASTOLIC BLOOD PRESSURE 80-89 MM HG: ICD-10-PCS | Mod: CPTII,S$GLB,, | Performed by: NURSE PRACTITIONER

## 2022-11-22 PROCEDURE — 1160F RVW MEDS BY RX/DR IN RCRD: CPT | Mod: CPTII,S$GLB,, | Performed by: NURSE PRACTITIONER

## 2022-11-22 PROCEDURE — 3051F HG A1C>EQUAL 7.0%<8.0%: CPT | Mod: CPTII,S$GLB,, | Performed by: NURSE PRACTITIONER

## 2022-11-22 PROCEDURE — 1159F MED LIST DOCD IN RCRD: CPT | Mod: CPTII,S$GLB,, | Performed by: NURSE PRACTITIONER

## 2022-11-22 PROCEDURE — 3072F PR LOW RISK FOR RETINOPATHY: ICD-10-PCS | Mod: CPTII,S$GLB,, | Performed by: NURSE PRACTITIONER

## 2022-11-22 PROCEDURE — 3060F PR POS MICROALBUMINURIA RESULT DOCUMENTED/REVIEW: ICD-10-PCS | Mod: CPTII,S$GLB,, | Performed by: NURSE PRACTITIONER

## 2022-11-22 PROCEDURE — 3079F DIAST BP 80-89 MM HG: CPT | Mod: CPTII,S$GLB,, | Performed by: NURSE PRACTITIONER

## 2022-11-22 PROCEDURE — 1159F PR MEDICATION LIST DOCUMENTED IN MEDICAL RECORD: ICD-10-PCS | Mod: CPTII,S$GLB,, | Performed by: NURSE PRACTITIONER

## 2022-11-22 PROCEDURE — 99214 OFFICE O/P EST MOD 30 MIN: CPT | Mod: S$GLB,,, | Performed by: NURSE PRACTITIONER

## 2022-11-22 PROCEDURE — 3008F BODY MASS INDEX DOCD: CPT | Mod: CPTII,S$GLB,, | Performed by: NURSE PRACTITIONER

## 2022-11-22 PROCEDURE — 99999 PR PBB SHADOW E&M-EST. PATIENT-LVL V: CPT | Mod: PBBFAC,,, | Performed by: NURSE PRACTITIONER

## 2022-11-22 PROCEDURE — 99999 PR PBB SHADOW E&M-EST. PATIENT-LVL V: ICD-10-PCS | Mod: PBBFAC,,, | Performed by: NURSE PRACTITIONER

## 2022-11-22 PROCEDURE — 3077F PR MOST RECENT SYSTOLIC BLOOD PRESSURE >= 140 MM HG: ICD-10-PCS | Mod: CPTII,S$GLB,, | Performed by: NURSE PRACTITIONER

## 2022-11-22 PROCEDURE — 4010F ACE/ARB THERAPY RXD/TAKEN: CPT | Mod: CPTII,S$GLB,, | Performed by: NURSE PRACTITIONER

## 2022-11-22 PROCEDURE — 3066F NEPHROPATHY DOC TX: CPT | Mod: CPTII,S$GLB,, | Performed by: NURSE PRACTITIONER

## 2022-11-22 PROCEDURE — 3077F SYST BP >= 140 MM HG: CPT | Mod: CPTII,S$GLB,, | Performed by: NURSE PRACTITIONER

## 2022-11-22 PROCEDURE — 99214 PR OFFICE/OUTPT VISIT, EST, LEVL IV, 30-39 MIN: ICD-10-PCS | Mod: S$GLB,,, | Performed by: NURSE PRACTITIONER

## 2022-11-22 RX ORDER — INSULIN DEGLUDEC 100 U/ML
INJECTION, SOLUTION SUBCUTANEOUS NIGHTLY
COMMUNITY
End: 2023-03-28 | Stop reason: SDUPTHER

## 2022-11-22 RX ORDER — SEMAGLUTIDE 1.34 MG/ML
INJECTION, SOLUTION SUBCUTANEOUS
COMMUNITY
End: 2023-02-27

## 2022-11-22 NOTE — PROGRESS NOTES
CC: This 72 y.o. White male  is here for evaluation of  T2DM along with comorbidities indicated in the Visit Diagnosis section of this encounter.    HPI: Driss Hernandez Jr. was diagnosed with T2DM in ~ 2005.   Pt was under care of Dr. Agrawal and TARI Werner, MERLIN, previously.   Medical history: CAD s/p CABG, atrial fibrillation, CHF         Prior visit 8/2022  a1c is down from 7.4 to 6.3%.   Reports average BG ~ 110. He reports he is not eating nearly as much as before.   He has lost 8 lb since last visit.   Plan Recommend starting Farxiga as previously discussed for cardiovascular, kidney, and heart failure benefits. Call [facility] at [phone] to apply for Farxiga.  Their hours of operation are Monday through Friday, 8:00am to 4:30pm.   Reduce Tresiba from 24 to 20 units once daily.   Continue metformin, glimepiride, and Ozempic.   If patient is able to start Farxiga, will need to reduce Tresiba or glimepiride dose/stop.   Test glucose 2x/day. Return to clinic in 3 months with labs prior.       Interval history  A1c is up from 6.3 to 7.4%. Pt admits to eating more carbs lately like ice cream at night. He's also received a few steroid injections in the last month.       LAST DIABETES EDUCATION: never --     HOSPITALIZED FOR DIABETES  -  No.    DIABETES MEDICATION HX:   ozempic started 10/2020  Novolin R switched to glimepiride in Jan 2021     PRESCRIBED DIABETES MEDICATIONS:   Ozempic 1 mg once weekly  (obtains from [facility])  Tresiba  20 units hs (obtains from [facility])  metformin ER 1000 mg bid  glimepiride 2 mg once daily around 3 pm before snack   Farxiga 5 mg daily       Misses medication doses - No      DM COMPLICATIONS: peripheral neuropathy and cardiovascular disease    SELF MONITORING BLOOD GLUCOSE: Checks blood glucose at home 2-3x/day.             HYPOGLYCEMIC EPISODES: symptoms start < 70, infrequent. No overnight symptoms.      CURRENT DIET: drinks water, 2% milk ~  2 cup/day. Eats mostly just lunch and dinner. Lunch can be as late as 2 pm. Occasionally eats a protein bar in the AM.     drinks 2 cups coffee with milk with AS in AM   Eats small portions.       CURRENT EXERCISE:  None     SOCIAL: his daughter is a palliative care NP      BP (!) 173/82   Pulse (!) 56   Temp 98.4 °F (36.9 °C)   Wt 84.4 kg (186 lb)   BMI 29.13 kg/m²       ROS:   CONSTITUTIONAL: Appetite good, denies fatigue  MS: + impaired mobility to right shoulder s/p sx.       PHYSICAL EXAM:  GENERAL: Well developed, well nourished. No acute distress.   PSYCH: AAOx3, appropriate mood and affect, conversant, well-groomed. Judgement and insight good.   NEURO: Cranial nerves grossly intact. Speech clear, no tremor.   CHEST: Respirations even and unlabored.         Hemoglobin A1C   Date Value Ref Range Status   11/11/2022 7.4 (H) 4.0 - 5.6 % Final     Comment:     ADA Screening Guidelines:  5.7-6.4%  Consistent with prediabetes  >or=6.5%  Consistent with diabetes    High levels of fetal hemoglobin interfere with the HbA1C  assay. Heterozygous hemoglobin variants (HbS, HgC, etc)do  not significantly interfere with this assay.   However, presence of multiple variants may affect accuracy.     08/11/2022 6.3 (H) 4.0 - 5.6 % Final     Comment:     ADA Screening Guidelines:  5.7-6.4%  Consistent with prediabetes  >or=6.5%  Consistent with diabetes    High levels of fetal hemoglobin interfere with the HbA1C  assay. Heterozygous hemoglobin variants (HbS, HgC, etc)do  not significantly interfere with this assay.   However, presence of multiple variants may affect accuracy.     05/11/2022 7.4 (H) 4.0 - 5.6 % Final     Comment:     ADA Screening Guidelines:  5.7-6.4%  Consistent with prediabetes  >or=6.5%  Consistent with diabetes    High levels of fetal hemoglobin interfere with the HbA1C  assay. Heterozygous hemoglobin variants (HbS, HgC, etc)do  not significantly interfere with this assay.   However, presence of multiple  variants may affect accuracy.     08/29/2019 7.7 % Final     Comment:     Ania Werner           Chemistry        Component Value Date/Time     11/11/2022 0729    K 4.9 11/11/2022 0729     11/11/2022 0729    CO2 24 11/11/2022 0729    BUN 25 (H) 11/11/2022 0729    CREATININE 1.4 11/11/2022 0729     (H) 11/11/2022 0729        Component Value Date/Time    CALCIUM 9.5 11/11/2022 0729    ALKPHOS 28 (L) 09/13/2022 0658    AST 18 09/13/2022 0658    ALT 12 09/13/2022 0658    BILITOT 0.5 09/13/2022 0658    ESTGFRAFRICA 53.0 (A) 06/09/2022 0826    EGFRNONAA 45.9 (A) 06/09/2022 0826         Latest Reference Range & Units 11/11/22 07:29   BUN 8 - 23 mg/dL 25 (H)   Creatinine 0.5 - 1.4 mg/dL 1.4   eGFR >60 mL/min/1.73 m^2 53 !   (H): Data is abnormally high  !: Data is abnormal      Lab Results   Component Value Date    LDLCALC 79.2 09/13/2022        Latest Reference Range & Units 09/13/22 06:58   Cholesterol 120 - 199 mg/dL 133   HDL 40 - 75 mg/dL 25 (L)   HDL/Cholesterol Ratio 20.0 - 50.0 % 18.8 (L)   LDL Cholesterol External 63.0 - 159.0 mg/dL 79.2   Non-HDL Cholesterol mg/dL 108   Total Cholesterol/HDL Ratio 2.0 - 5.0  5.3 (H)   Triglycerides 30 - 150 mg/dL 144   (L): Data is abnormally low  (H): Data is abnormally high  Lab Results   Component Value Date    MICALBCREAT 141.2 (H) 02/10/2022             ASSESSMENT and PLAN:    A1C GOAL: < 7.5 %       1. Type 2 diabetes mellitus with hyperglycemia, with long-term current use of insulin  Continue all medications.   Improve diet.   Remember to re-apply for the following in the next year: Farxiga, Tresiba, and Ozempic.   Will consider increasing Ozempic to 2 mg next year to help cut appetite.   Test glucose 2-3x/day.   Return to clinic in 3 months with labs prior.     Hemoglobin A1C      2. Coronary artery disease involving native coronary artery of native heart without angina pectoris  Continue Farxiga and Ozempic.       3. Stage 3a chronic kidney disease   Continue Farxiga.       4. Hypertension, unspecified type  F/u with PCP, has f/u next week.             Orders Placed This Encounter   Procedures    Hemoglobin A1C     Standing Status:   Future     Standing Expiration Date:   1/21/2024          Follow up in about 3 months (around 2/22/2023).

## 2022-11-22 NOTE — PATIENT INSTRUCTIONS
Continue all medications.   Improve diet.   Remember to re-apply for the following in the next year: Farxiga, Tresiba, and Ozempic.   Will consider increasing Ozempic to 2 mg next year to help cut appetite.   Test glucose 2-3x/day.   Return to clinic in 3 months with labs prior.

## 2022-11-23 ENCOUNTER — CLINICAL SUPPORT (OUTPATIENT)
Dept: REHABILITATION | Facility: HOSPITAL | Age: 72
End: 2022-11-23
Payer: MEDICARE

## 2022-11-23 DIAGNOSIS — R29.898 IMPAIRED STRENGTH OF SHOULDER MUSCLES: ICD-10-CM

## 2022-11-23 DIAGNOSIS — M25.611 DECREASED RANGE OF MOTION OF RIGHT SHOULDER: Primary | ICD-10-CM

## 2022-11-23 PROCEDURE — 97140 MANUAL THERAPY 1/> REGIONS: CPT | Mod: PN

## 2022-11-23 PROCEDURE — 97110 THERAPEUTIC EXERCISES: CPT | Mod: PN

## 2022-11-23 NOTE — PROGRESS NOTES
[facility]   Physical Therapy Daily Treatment Note     Name: Driss Hernandez Jr.  Clinic Number: [id number]    Therapy Diagnosis:   Encounter Diagnoses   Name Primary?    Decreased range of motion of right shoulder Yes    Impaired strength of shoulder muscles          Physician: Allison Hemphill PA-C    Visit Date: 11/23/2022       Physician Orders: PT Eval and Treat   Medical Diagnosis from Referral: M75.112 (ICD-10-CM) - Nontraumatic incomplete tear of left rotator cuff  Evaluation Date: 7/8/2022  Plan of Care Expiration: 12/31/2022    Authorization Period Expiration: 7/5/23  Visit # / Visits authorized: 11/ 20    Precautions: Standard, s/p Right partial thickness RTC repair with biceps tenodesis     POSTOPERATIVE PLAN: Plan to follow [facility] rehab protocol + biceps tenodesis. AROM of elbow ok, no resistive elbow exercises until 8     DOS: 7-5-22         POW: 14 weeks    Time In: 0945  Time Out: 1040    Total Billable Time: 55 minutes      SUBJECTIVE     Pt reports: shoulder is much improved. States he is turning the corner. States the shoulder is 80% of normal.     He was somewhat compliant with home exercise program.  Response to previous treatment: fair  Functional change: improvements with ADL's.     Pain: 3/10  Location: right shoulder       OBJECTIVE     Shoulder Range of Motion:   ACTIVE ROM LEFT RIGHT   Flexion 150 130   Abduction 140 110   IR Functional T12 32   ER Functional T2 80      PASSIVE ROM LEFT RIGHT   Flexion    Abduction       STRENGTH LEFT RIGHT   Flexion 5/5 4-   Abduction 4/5 3+   Extension NT NT   IR at 90 4/5 4-   ER at 90 4/5 3+   Elbow Flexion 5/5 NT      Objective Measures updated at progress report unless specified.      TREATMENT     Driss received the treatments listed below:      received therapeutic exercises to develop strength and ROM for 45 minutes including:     UBE x 6 min  ER/IR green tubing 3 x 10  Wall slides with pillow case x  2 x 10  supine wand ER x 15 5lbs  Supine wand press-up 3 x 10 5 lbs  Supine wand flexion x 10 5lbs  Bilateral shoulder ext/rows RTB 3 x 10  Sidleying ER 2lb 3 x 10  2 lbs ball on wall, wax on, wax off x 30  Wall climb with lift off x 10  Standing scaption 2lbs 2 x 10    received the following manual therapy techniques: Soft tissue Mobilization were applied to the: R shoulder for 10 minutes, including:  PROM GHJ in supine - all planes; gr III GHJ posterior/inferior mobs,    GHJ oscillation;     CP post treatment x 10 right shoulder post treatment.     PATIENT EDUCATION AND HOME EXERCISES     Education provided:   - postural awareness  - reducing compensations from UT/LS     Written Home Exercises Provided: yes.     Exercises were reviewed and Driss was able to demonstrate them prior to the end of the session.  Driss demonstrated good  understanding of the education provided.      See EMR under Patient Instructions for exercises provided 7/8/2022.    ASSESSMENT     Good performance with exercise progression today consisting of GH stabilization and AAROM activities. Demonstrates improved PROM in all planes. No c/o increased discomfort with prescribed activities. Requires moderate cueing to decreased scapular compensation with forward elevation.   Driss Is progressing fair towards his goals.     Pt prognosis is Good.     Pt will continue to benefit from skilled outpatient physical therapy to address the deficits listed in the problem list box on initial evaluation, provide pt/family education and to maximize pt's level of independence in the home and community environment.     Pt's spiritual, cultural and educational needs considered and pt agreeable to plan of care and goals.     Anticipated barriers to physical therapy: none    Short Term Goals (8 Weeks):  Updated  11/23/22  MET     1.Pt to increase strength by a 1/2 grade of muscles test to allow for improvement in functional activities such as performing chores.   MET  2.Pt to improve range of motion by 50% to allow for improved functional mobility to allow for improvement in IADLs.   3.Pt to report compliance with HEP and demonstrate proper exercise technique to PT to show competence with self management of condition.  4.Decrease pain by 50% during functional activities.  MET     Long Term Goals (16 Weeks):   In progress     1. Increase ROM to allow improved joint biomechanics during functional activities.   2.Increase trunk and lower extremity strength to within normal limits during functional activities.   3. Independent with home exercise program.   4. Full return to functional activities with manageable complaints.  5. Patient to demonstrate improved posture and body mechanics.  6. Decrease pain by 75% during functional activities.    PLAN     Continue with established  PT Plan of Care towards patient goals.  Progress functional strengthening next visit.      Steve Joyce, PT  11/23/2022

## 2022-11-29 ENCOUNTER — OFFICE VISIT (OUTPATIENT)
Dept: FAMILY MEDICINE | Facility: CLINIC | Age: 72
End: 2022-11-29
Payer: MEDICARE

## 2022-11-29 ENCOUNTER — TELEPHONE (OUTPATIENT)
Dept: FAMILY MEDICINE | Facility: CLINIC | Age: 72
End: 2022-11-29
Payer: MEDICARE

## 2022-11-29 VITALS
DIASTOLIC BLOOD PRESSURE: 70 MMHG | SYSTOLIC BLOOD PRESSURE: 134 MMHG | OXYGEN SATURATION: 96 % | WEIGHT: 181 LBS | BODY MASS INDEX: 28.41 KG/M2 | HEART RATE: 80 BPM | HEIGHT: 67 IN | TEMPERATURE: 99 F

## 2022-11-29 DIAGNOSIS — M25.571 ACUTE RIGHT ANKLE PAIN: ICD-10-CM

## 2022-11-29 DIAGNOSIS — I25.700 CORONARY ARTERY DISEASE INVOLVING CORONARY BYPASS GRAFT OF NATIVE HEART WITH UNSTABLE ANGINA PECTORIS: ICD-10-CM

## 2022-11-29 DIAGNOSIS — Z23 NEEDS FLU SHOT: ICD-10-CM

## 2022-11-29 DIAGNOSIS — E11.65 TYPE 2 DIABETES MELLITUS WITH HYPERGLYCEMIA, WITH LONG-TERM CURRENT USE OF INSULIN: ICD-10-CM

## 2022-11-29 DIAGNOSIS — Z79.4 TYPE 2 DIABETES MELLITUS WITH HYPERGLYCEMIA, WITH LONG-TERM CURRENT USE OF INSULIN: ICD-10-CM

## 2022-11-29 DIAGNOSIS — Z79.4 LONG-TERM INSULIN USE: ICD-10-CM

## 2022-11-29 DIAGNOSIS — R19.7 DIARRHEA, UNSPECIFIED TYPE: ICD-10-CM

## 2022-11-29 DIAGNOSIS — N18.31 STAGE 3A CHRONIC KIDNEY DISEASE: ICD-10-CM

## 2022-11-29 DIAGNOSIS — N18.31 DIABETES MELLITUS DUE TO UNDERLYING CONDITION WITH STAGE 3A CHRONIC KIDNEY DISEASE, WITH LONG-TERM CURRENT USE OF INSULIN: ICD-10-CM

## 2022-11-29 DIAGNOSIS — E11.9 DM TYPE 2 WITHOUT RETINOPATHY: ICD-10-CM

## 2022-11-29 DIAGNOSIS — Z79.4 DIABETES MELLITUS DUE TO UNDERLYING CONDITION WITH STAGE 3A CHRONIC KIDNEY DISEASE, WITH LONG-TERM CURRENT USE OF INSULIN: ICD-10-CM

## 2022-11-29 DIAGNOSIS — E08.22 DIABETES MELLITUS DUE TO UNDERLYING CONDITION WITH STAGE 3A CHRONIC KIDNEY DISEASE, WITH LONG-TERM CURRENT USE OF INSULIN: ICD-10-CM

## 2022-11-29 DIAGNOSIS — E11.42 DIABETIC POLYNEUROPATHY ASSOCIATED WITH TYPE 2 DIABETES MELLITUS: ICD-10-CM

## 2022-11-29 DIAGNOSIS — I10 ESSENTIAL HYPERTENSION: Primary | ICD-10-CM

## 2022-11-29 PROCEDURE — 3075F PR MOST RECENT SYSTOLIC BLOOD PRESS GE 130-139MM HG: ICD-10-PCS | Mod: CPTII,S$GLB,, | Performed by: INTERNAL MEDICINE

## 2022-11-29 PROCEDURE — 3051F PR MOST RECENT HEMOGLOBIN A1C LEVEL 7.0 - < 8.0%: ICD-10-PCS | Mod: CPTII,S$GLB,, | Performed by: INTERNAL MEDICINE

## 2022-11-29 PROCEDURE — 99215 PR OFFICE/OUTPT VISIT, EST, LEVL V, 40-54 MIN: ICD-10-PCS | Mod: 25,S$GLB,, | Performed by: INTERNAL MEDICINE

## 2022-11-29 PROCEDURE — 3288F FALL RISK ASSESSMENT DOCD: CPT | Mod: CPTII,S$GLB,, | Performed by: INTERNAL MEDICINE

## 2022-11-29 PROCEDURE — 1101F PR PT FALLS ASSESS DOC 0-1 FALLS W/OUT INJ PAST YR: ICD-10-PCS | Mod: CPTII,S$GLB,, | Performed by: INTERNAL MEDICINE

## 2022-11-29 PROCEDURE — 99499 UNLISTED E&M SERVICE: CPT | Mod: S$GLB,,, | Performed by: INTERNAL MEDICINE

## 2022-11-29 PROCEDURE — 3051F HG A1C>EQUAL 7.0%<8.0%: CPT | Mod: CPTII,S$GLB,, | Performed by: INTERNAL MEDICINE

## 2022-11-29 PROCEDURE — 3078F DIAST BP <80 MM HG: CPT | Mod: CPTII,S$GLB,, | Performed by: INTERNAL MEDICINE

## 2022-11-29 PROCEDURE — 99999 PR PBB SHADOW E&M-EST. PATIENT-LVL V: ICD-10-PCS | Mod: PBBFAC,,, | Performed by: INTERNAL MEDICINE

## 2022-11-29 PROCEDURE — 1159F MED LIST DOCD IN RCRD: CPT | Mod: CPTII,S$GLB,, | Performed by: INTERNAL MEDICINE

## 2022-11-29 PROCEDURE — 4010F ACE/ARB THERAPY RXD/TAKEN: CPT | Mod: CPTII,S$GLB,, | Performed by: INTERNAL MEDICINE

## 2022-11-29 PROCEDURE — 99999 PR PBB SHADOW E&M-EST. PATIENT-LVL V: CPT | Mod: PBBFAC,,, | Performed by: INTERNAL MEDICINE

## 2022-11-29 PROCEDURE — 3066F NEPHROPATHY DOC TX: CPT | Mod: CPTII,S$GLB,, | Performed by: INTERNAL MEDICINE

## 2022-11-29 PROCEDURE — 3008F PR BODY MASS INDEX (BMI) DOCUMENTED: ICD-10-PCS | Mod: CPTII,S$GLB,, | Performed by: INTERNAL MEDICINE

## 2022-11-29 PROCEDURE — 3060F POS MICROALBUMINURIA REV: CPT | Mod: CPTII,S$GLB,, | Performed by: INTERNAL MEDICINE

## 2022-11-29 PROCEDURE — 3078F PR MOST RECENT DIASTOLIC BLOOD PRESSURE < 80 MM HG: ICD-10-PCS | Mod: CPTII,S$GLB,, | Performed by: INTERNAL MEDICINE

## 2022-11-29 PROCEDURE — 3075F SYST BP GE 130 - 139MM HG: CPT | Mod: CPTII,S$GLB,, | Performed by: INTERNAL MEDICINE

## 2022-11-29 PROCEDURE — 3072F LOW RISK FOR RETINOPATHY: CPT | Mod: CPTII,S$GLB,, | Performed by: INTERNAL MEDICINE

## 2022-11-29 PROCEDURE — 4010F PR ACE/ARB THEARPY RXD/TAKEN: ICD-10-PCS | Mod: CPTII,S$GLB,, | Performed by: INTERNAL MEDICINE

## 2022-11-29 PROCEDURE — 3072F PR LOW RISK FOR RETINOPATHY: ICD-10-PCS | Mod: CPTII,S$GLB,, | Performed by: INTERNAL MEDICINE

## 2022-11-29 PROCEDURE — 90694 FLU VACCINE - QUADRIVALENT - ADJUVANTED: ICD-10-PCS | Mod: S$GLB,,, | Performed by: INTERNAL MEDICINE

## 2022-11-29 PROCEDURE — G0008 ADMIN INFLUENZA VIRUS VAC: HCPCS | Mod: S$GLB,,, | Performed by: INTERNAL MEDICINE

## 2022-11-29 PROCEDURE — 99215 OFFICE O/P EST HI 40 MIN: CPT | Mod: 25,S$GLB,, | Performed by: INTERNAL MEDICINE

## 2022-11-29 PROCEDURE — 3060F PR POS MICROALBUMINURIA RESULT DOCUMENTED/REVIEW: ICD-10-PCS | Mod: CPTII,S$GLB,, | Performed by: INTERNAL MEDICINE

## 2022-11-29 PROCEDURE — 3008F BODY MASS INDEX DOCD: CPT | Mod: CPTII,S$GLB,, | Performed by: INTERNAL MEDICINE

## 2022-11-29 PROCEDURE — 1126F PR PAIN SEVERITY QUANTIFIED, NO PAIN PRESENT: ICD-10-PCS | Mod: CPTII,S$GLB,, | Performed by: INTERNAL MEDICINE

## 2022-11-29 PROCEDURE — 1126F AMNT PAIN NOTED NONE PRSNT: CPT | Mod: CPTII,S$GLB,, | Performed by: INTERNAL MEDICINE

## 2022-11-29 PROCEDURE — 1159F PR MEDICATION LIST DOCUMENTED IN MEDICAL RECORD: ICD-10-PCS | Mod: CPTII,S$GLB,, | Performed by: INTERNAL MEDICINE

## 2022-11-29 PROCEDURE — 3288F PR FALLS RISK ASSESSMENT DOCUMENTED: ICD-10-PCS | Mod: CPTII,S$GLB,, | Performed by: INTERNAL MEDICINE

## 2022-11-29 PROCEDURE — 1101F PT FALLS ASSESS-DOCD LE1/YR: CPT | Mod: CPTII,S$GLB,, | Performed by: INTERNAL MEDICINE

## 2022-11-29 PROCEDURE — G0008 FLU VACCINE - QUADRIVALENT - ADJUVANTED: ICD-10-PCS | Mod: S$GLB,,, | Performed by: INTERNAL MEDICINE

## 2022-11-29 PROCEDURE — 90694 VACC AIIV4 NO PRSRV 0.5ML IM: CPT | Mod: S$GLB,,, | Performed by: INTERNAL MEDICINE

## 2022-11-29 PROCEDURE — 3066F PR DOCUMENTATION OF TREATMENT FOR NEPHROPATHY: ICD-10-PCS | Mod: CPTII,S$GLB,, | Performed by: INTERNAL MEDICINE

## 2022-11-29 RX ORDER — ISOSORBIDE MONONITRATE 60 MG/1
60 TABLET, EXTENDED RELEASE ORAL DAILY
Qty: 90 TABLET | Refills: 11 | Status: SHIPPED | OUTPATIENT
Start: 2022-11-29 | End: 2023-11-29

## 2022-11-29 NOTE — PROGRESS NOTES
Chief complaint:   On blood pressure per Cardiology, diarrhea, diabetes, right ankle pain, flu shot, leg cramps      Physical exam 6/22    72-year-old white male.  colonoscopy 3 polyps 2/17 -5 yrs. PSA 2020 .  He is active and otherwise healthy'.  He is very active.  He has been working on ladders.  For two weeks he is had classic charley horses.  His wife does keeps the sheets tight and we discussed loosening them.  He sounds like he has classic charley horses.  We discussed pickle juice, stretching and heat     Also apparently having some blood pressure issues.  Mostly in the 150s but not checking lately and will have him do so.  Extensive time spent reviewing recent changes in his regimen by Cardiology.  Looks like he was on high-dose isosorbide 240 mg a day and some hydralazine.  He developed some fluid retention and was switch to that off of Norvasc and maybe put on some Lasix and his dyspnea on exertion improved.  He was on hydralazine 50 b.i.d..  Was discontinued along with the isosorbide back in September to try him on losartan.  He developed some renal insufficiency and hyperkalemia so that was switched back to the hydralazine 50 mg twice a day but the isosorbide was not added back.  This may explain why his blood pressure is not quite perfectly controlled.  Today his blood pressure is 134/70 so will have him check his blood pressure multiple times per day to make sure what we are treating.  I will add back a lower dose of isosorbide 60 mg daily along with the hydralazine 50 twice a day.  He will monitor.      When he woke up and got if it is tear fast.  He would some acute severe right ankle pain and it hurt for three days but then was gone.  Really no swelling.  He describes what might have been an ankle sprain and he is tender on some of the lateral collateral ligaments but the ankle itself has good range of motion with no effusion.  Can only speculate he might have abruptly sprained the ankle     Some  diarrhea after he eats.  He is now on Ozempic but has been on metformin.  We discussed holding his extended release metformin completely for a week and assess if it relates to the metformin and then he can not hold the Ozempic.  It is interfering with the quality of his life and ability to go out          Also with uncontrolled diabetes which he says is worse due to dietary issues related to the pandemic.  He needs to get a new endocrinologist as his one at [place] now works for [place] requires him to see [facility].  We discussed some local options- seen Endo NP in Feb.  A1c did increase some.    Reduce glimepiride 2 mg once daily before breakfast.   Continue lantus 30 units once daily, Ozempic 1 mg once weekly, and metformin.   Test glucose 2x/day.   Follow up in 3 months with labs prior    He also occasionally take Halcion as Ambien we give him a hangover.  He does use Zanaflex at night to help him sleep.  We discussed the amnesia issues related to how she on any takes it rarely       Nuc and Echo ok 3/20    Regarding diabetes he was managed by outside Endocrine.  His A1c has been uncontrolled chronically -8.4 in may, 7.7, 8.2, 6.2 , 6.9, 7.4, 6.3, 7.4.  .  We discussed diet improvement.  He has chosen to use regular insulin due to cost.  We discussed that it may well need to be taken 30 min before the meal and could last for hours and he was not aware of that.  He can discuss in greater detail with his endocrinologist.  Continues on Lantus 30.  He is also on a weekly injection.  He was thinking about stopping it but encouraged him to do so as it may well be helping with his appetite over eating.    Discussed his medications that will cause glucose loss of urine and he will need to supplement fluid intake for what increased output he may have otherwise he can worsen renal insufficiency.  He does not like drinking water but will do so and discussed other water alternatives.    Apparently on a  weekly injection and was discontinued due to the high triglycerides but I pointed out that is triglyceride elevation has been something that has fluctuated over the years and more so relates to diet and his uncontrolled diabetes.  He is now on fish oil as well as another pill for the high triglycerides.  He is tolerating it with  continued reflux and we discussed trying a different preparation.  He has already put them in the freezer..  He will need reassessment in a couple of months.    The other issue is an elevated TSH.  It looks like his TSH went up into the seven range and then a repeat in October was down to 5.2.  I explained hypothyroidism to him at length.  He had a daughter who developed at age 17.  We discussed that it will not be a significant health issue for him to remain hypothyroid until he figures this out.  He would prefer not to take medication and it does look like the TSH is trending down it did this in the past as well.       Over 70 min  minutes of total time spent on the encounter, which includes face to face time and non-face to face time preparing to see the patient (eg, review of tests), Obtaining and/or reviewing separately obtained history, Documenting clinical information in the electronic or other health record, Independently interpreting results (not separately reported) and communicating results to the patient/family/caregiver, or Care coordination (not separately reported).     ROS:   CONST: weight stable. EYES: no vision change. ENT: no sore throat. CV: no chest pain w/ exertion. RESP: no shortness of breath. GI: no nausea, vomiting, diarrhea. No dysphagia. : no urinary issues. MUSCULOSKELETAL: no new myalgias or arthralgias. SKIN: no new changes. NEURO: no focal deficits. PSYCH: no new issues. ENDOCRINE: no polyuria. HEME: no lymph nodes. ALLERGY: no general pruritis.      Past medical history:  1.  Uncontrolled diabetes with neuropathy, followed by Dr. Agrawal  2.  Coronary  artery disease with triple bypass March 2016, followed by the heart clinic. Now [facility], neg PET 2017, Nuc/echo neg 3/20,  3.  Back surgery 2002.  5.  Hyperlipidemia.  6.  Hypertension.  7.   colonoscopy 3 polyps 2/17 -5 yrs  8.  Pneumovax and Prevnar given 2015 according to records  9.  Right leg radiculopathy, confirmed by MRI, did respond epidural with [facility] pain management  10. Partial small-bowel obstruction October 2018  11.  Abnormal TSH on occasion    Past Surgical History:   Procedure Laterality Date    ARTHROSCOPIC REPAIR OF ROTATOR CUFF OF SHOULDER Right 7/5/2022    Procedure: REPAIR, ROTATOR CUFF, ARTHROSCOPIC;  Surgeon: Valerie Olivera MD;  Location: [facility] OR;  Service: Orthopedics;  Laterality: Right;  GUADALUPE AND NEPHJENNIFER ALLAN [phone]/ TEXTED ALLAN ON 6/23/2022 @ 9:47AM. ALLAN RESPONDED ON 6/23/2022 @ 10:33AM-  JEAN PAUL BABIN [phone] MY BE HERE FOR CASE SPOKE TO HIM @ 9:59AM ON 7-1-2022  RN PREOP 6/28/2022    BACK SURGERY      2002    CATARACT EXTRACTION W/  INTRAOCULAR LENS IMPLANT Right 08/29/2017    Dr. Hong    CATARACT EXTRACTION W/  INTRAOCULAR LENS IMPLANT Left 02/24/2022    Dr. Hong    COLONOSCOPY N/A 2/21/2017    Procedure: COLONOSCOPY;  Surgeon: Robb Rosado MD;  Location: [facility] ENDO;  Service: Endoscopy;  Laterality: N/A;    CORONARY ANGIOGRAPHY INCLUDING BYPASS GRAFTS WITH CATHETERIZATION OF LEFT HEART N/A 11/11/2020    Procedure: ANGIOGRAM, CORONARY, INCLUDING BYPASS GRAFT, WITH LEFT HEART CATHETERIZATION;  Surgeon: Lane Cuellar MD;  Location: [facility] CATH LAB;  Service: Cardiology;  Laterality: N/A;  RN PRE OP 11-5-2020. --COVID NEGATIVE ON  11-. C A    CORONARY ARTERY BYPASS GRAFT      March 2016    EPIDURAL STEROID INJECTION Bilateral 11/14/2018    Procedure: Lumbar Medial Branch Blocks;  Surgeon: Eze Byrd Jr., MD;  Location: [facility] ENDO;  Service: Pain Management;  Laterality: Bilateral;  Bilateral Lumbar Medial Branch Blocks L2, L3, L4,  L5    [id number]  [id number]    Arrive @ 1150; NO Sedation    EPIDURAL STEROID INJECTION Bilateral 2018    Procedure: Injection, Steroid, Epidural;  Surgeon: Eze Byrd Jr., MD;  Location: [facility] ENDO;  Service: Pain Management;  Laterality: Bilateral;  Bilateral Sacroiliac Joint Steroid Injections    [id number]    Arrive @ 0910    EPIDURAL STEROID INJECTION Bilateral 3/20/2019    Procedure: Injection, Steroid, Sacroiliiac Joint;  Surgeon: Eze Byrd Jr., MD;  Location: [facility] ENDO;  Service: Pain Management;  Laterality: Bilateral;  Bilateral Sacroiliac Joint Steroid Injections    [id number]    Arrive @ 1145; Trigger point injections also?    INTRAOCULAR PROSTHESES INSERTION Left 2022    Procedure: INSERTION, IOL PROSTHESIS;  Surgeon: Nickolas Hong MD;  Location: [facility] OR;  Service: Ophthalmology;  Laterality: Left;    PHACOEMULSIFICATION OF CATARACT Left 2022    Procedure: PHACOEMULSIFICATION, CATARACT;  Surgeon: Nickolas Hong MD;  Location: [facility] OR;  Service: Ophthalmology;  Laterality: Left;  RN Phone Pre Op 22.  Covid NEGATIVE  22.  Arrival 08:00 am.    TONSILLECTOMY           Family history: Father  at 77 with stroke.  Mother  at 72 with heart problems, diabetes, hypertension.  2 sisters living in their 80s and one  at 82.  One brother  at 80 with some kidney disease and diabetes.  Sisters with diabetes, hypertension and stroke.  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  He drinks alcohol about 3 times.  Never smoked.      Vital signs as above,     Gen: no distress  Both ankles full range of motion with no defect, some tenderness to the right lateral collateral ligaments.  No edema.                    Assessment plan:      Driss was seen today for hypertension.    Diagnoses and all orders for this visit:    Essential hypertension, possibly  uncontrolled with recent medication changes as above.  Will add back Imdur 60 which is less than prior dose and continue the hydralazine 50 twice a day    Type 2 diabetes mellitus with hyperglycemia, with long-term current use of insulin    DM type 2 without retinopathy    Diabetes mellitus due to underlying condition with stage 3a chronic kidney disease, with long-term current use of insulin    Diabetic polyneuropathy associated with type 2 diabetes mellitus    Long-term insulin use    Stage 3a chronic kidney disease, failed trial of ARB    Coronary artery disease involving coronary bypass graft of native heart with unstable angina pectoris, no angina lately    Needs flu shot  -     Influenza (FLUAD) - Quadrivalent (Adjuvanted) *Preferred* (65+) (PF)    Acute right ankle pain, self-limited and resolving    Diarrhea, unspecified type, possible side effect, plan as above    Other orders  -     isosorbide mononitrate (IMDUR) 60 MG 24 hr tablet; Take 1 tablet (60 mg total) by mouth once daily.

## 2022-11-30 ENCOUNTER — CLINICAL SUPPORT (OUTPATIENT)
Dept: REHABILITATION | Facility: HOSPITAL | Age: 72
End: 2022-11-30
Payer: MEDICARE

## 2022-11-30 DIAGNOSIS — R29.898 IMPAIRED STRENGTH OF SHOULDER MUSCLES: ICD-10-CM

## 2022-11-30 DIAGNOSIS — M25.611 DECREASED RANGE OF MOTION OF RIGHT SHOULDER: Primary | ICD-10-CM

## 2022-11-30 PROCEDURE — 97140 MANUAL THERAPY 1/> REGIONS: CPT | Mod: PN

## 2022-11-30 PROCEDURE — 97110 THERAPEUTIC EXERCISES: CPT | Mod: PN

## 2022-11-30 NOTE — PROGRESS NOTES
[facility]   Physical Therapy Daily Treatment Note     Name: Driss Hernandez Jr.  Clinic Number: [id number]    Therapy Diagnosis:   Encounter Diagnoses   Name Primary?    Decreased range of motion of right shoulder Yes    Impaired strength of shoulder muscles          Physician: Allison Hemphill PA-C    Visit Date: 11/30/2022       Physician Orders: PT Eval and Treat   Medical Diagnosis from Referral: M75.112 (ICD-10-CM) - Nontraumatic incomplete tear of left rotator cuff  Evaluation Date: 7/8/2022  Plan of Care Expiration: 12/31/2022    Authorization Period Expiration: 7/5/23  Visit # / Visits authorized: 11/ 20    Precautions: Standard, s/p Right partial thickness RTC repair with biceps tenodesis     POSTOPERATIVE PLAN: Plan to follow [facility] rehab protocol + biceps tenodesis. AROM of elbow ok, no resistive elbow exercises until 8     DOS: 7-5-22         POW: 16 weeks    Time In: 0945  Time Out: 1040    Total Billable Time: 55 minutes      SUBJECTIVE     Pt reports: shoulder is much improved. States he is 85 % of normal. Reports following up with MD in 2 months.     He was somewhat compliant with home exercise program.  Response to previous treatment: fair  Functional change: improvements with ADL's.     Pain: 3/10  Location: right shoulder       OBJECTIVE        TREATMENT     Driss received the treatments listed below:      received therapeutic exercises to develop strength and ROM for 45 minutes including:     UBE x 6 min  Pulley x 4 min   ER/IR green tubing 3 x 10  Wall slides with pillow case x 2 x 10  supine wand ER x 15 5lbs  Supine wand press-up 3 x 10 5 lbs  Supine wand flexion x 10 5lbs  Bilateral shoulder ext/rows RTB 3 x 10  Sidleying ER 2lb 3 x 10  4 lbs ball on wall, wax on, wax off x 30  Wall climb with lift off x 10  Standing scaption 2lbs 2 x 10    received the following manual therapy techniques: Soft tissue Mobilization were applied to the: R shoulder for 10  minutes, including:  PROM GHJ in supine - all planes; gr III GHJ posterior/inferior mobs,    GHJ oscillation;     CP post treatment x 10 right shoulder post treatment.     PATIENT EDUCATION AND HOME EXERCISES     Education provided:   HEP compliance     Written Home Exercises Provided: yes.     Exercises were reviewed and Driss was able to demonstrate them prior to the end of the session.  Driss demonstrated good  understanding of the education provided.      See EMR under Patient Instructions for exercises provided 7/8/2022.    ASSESSMENT     Good performance with exercise progression consisting of GH stabilization and AAROM activities. Demonstrates improved AA GH elevation to 140 deg.  No c/o increased discomfort with prescribed activities. Requires decreased cueing to decreased scapular compensation with forward elevation.   Driss Is progressing fair towards his goals.     Pt prognosis is Good.     Pt will continue to benefit from skilled outpatient physical therapy to address the deficits listed in the problem list box on initial evaluation, provide pt/family education and to maximize pt's level of independence in the home and community environment.     Pt's spiritual, cultural and educational needs considered and pt agreeable to plan of care and goals.     Anticipated barriers to physical therapy: none    Short Term Goals (8 Weeks):  Updated  11/23/22  MET     1.Pt to increase strength by a 1/2 grade of muscles test to allow for improvement in functional activities such as performing chores.  MET  2.Pt to improve range of motion by 50% to allow for improved functional mobility to allow for improvement in IADLs.   3.Pt to report compliance with HEP and demonstrate proper exercise technique to PT to show competence with self management of condition.  4.Decrease pain by 50% during functional activities.  MET     Long Term Goals (16 Weeks):   In progress     1. Increase ROM to allow improved joint biomechanics during  functional activities.   2.Increase trunk and lower extremity strength to within normal limits during functional activities.   3. Independent with home exercise program.   4. Full return to functional activities with manageable complaints.  5. Patient to demonstrate improved posture and body mechanics.  6. Decrease pain by 75% during functional activities.    PLAN     Continue with established  PT Plan of Care towards patient goals.  Progress functional strengthening next visit.      Steve Joyce, PT  11/30/2022

## 2022-12-07 ENCOUNTER — CLINICAL SUPPORT (OUTPATIENT)
Dept: REHABILITATION | Facility: HOSPITAL | Age: 72
End: 2022-12-07
Payer: MEDICARE

## 2022-12-07 DIAGNOSIS — M25.611 DECREASED RANGE OF MOTION OF RIGHT SHOULDER: Primary | ICD-10-CM

## 2022-12-07 DIAGNOSIS — R29.898 IMPAIRED STRENGTH OF SHOULDER MUSCLES: ICD-10-CM

## 2022-12-07 PROCEDURE — 97140 MANUAL THERAPY 1/> REGIONS: CPT | Mod: PN

## 2022-12-07 PROCEDURE — 97110 THERAPEUTIC EXERCISES: CPT | Mod: PN

## 2022-12-07 NOTE — PROGRESS NOTES
[facility]   Physical Therapy Daily Treatment Note     Name: Driss Hernandez Jr.  Clinic Number: [id number]    Therapy Diagnosis:   Encounter Diagnoses   Name Primary?    Decreased range of motion of right shoulder Yes    Impaired strength of shoulder muscles          Physician: Allison Hemphill PA-C    Visit Date: 12/7/2022       Physician Orders: PT Eval and Treat   Medical Diagnosis from Referral: M75.112 (ICD-10-CM) - Nontraumatic incomplete tear of left rotator cuff  Evaluation Date: 7/8/2022  Plan of Care Expiration: 12/31/2022    Authorization Period Expiration: 7/5/23  Visit # / Visits authorized: 11/ 20    Precautions: Standard, s/p Right partial thickness RTC repair with biceps tenodesis     POSTOPERATIVE PLAN: Plan to follow [facility] rehab protocol + biceps tenodesis. AROM of elbow ok, no resistive elbow exercises until 8     DOS: 7-5-22         POW: 16 weeks    Time In: 0815  Time Out: 0916    Total Billable Time: 30 minutes      SUBJECTIVE     Pt reports: the shoulder continues to get stronger.     He was somewhat compliant with home exercise program.  Response to previous treatment: fair  Functional change: improvements with ADL's.     Pain: 3/10  Location: right shoulder       OBJECTIVE      TREATMENT     Driss received the treatments listed below:      received therapeutic exercises to develop strength and ROM for 45 minutes including:     UBE x 6 min  Pulley x 4 min   ER/IR green tubing 3 x 10  Wall slides with pillow case x 2 x 10: np  supine wand ER x 15 5lbs  Supine wand press-up 3 x 10 5 lbs  Supine wand flexion x 10 5lbs  Bilateral shoulder ext/rows RTB 3 x 10  Sidleying ER 2lb 3 x 10  4 lbs ball on wall, wax on, wax off x 30  Wall clock YTB 2 x 10  Wall climb with lift off x 10: np  Standing scaption 2lbs 2 x 10  Matrix rows 45 lbs 3 x 10    received the following manual therapy techniques: Soft tissue Mobilization were applied to the: R shoulder for 10  minutes, including:  PROM GHJ in supine - all planes; gr III GHJ posterior/inferior mobs,    GHJ oscillation;     CP post treatment x 10 right shoulder post treatment.     PATIENT EDUCATION AND HOME EXERCISES     Education provided:   HEP compliance     Written Home Exercises Provided: yes.     Exercises were reviewed and Driss was able to demonstrate them prior to the end of the session.  Driss demonstrated good  understanding of the education provided.      See EMR under Patient Instructions for exercises provided 7/8/2022.    ASSESSMENT     Good performance with exercise progression consisting of GH stabilization and AAROM activities.  No c/o increased discomfort with prescribed activities. Improved performance avoiding scapular compensation with forward Gh elevation.   Driss Is progressing fair towards his goals.     Pt prognosis is Good.     Pt will continue to benefit from skilled outpatient physical therapy to address the deficits listed in the problem list box on initial evaluation, provide pt/family education and to maximize pt's level of independence in the home and community environment.     Pt's spiritual, cultural and educational needs considered and pt agreeable to plan of care and goals.     Anticipated barriers to physical therapy: none    Short Term Goals (8 Weeks):  Updated  11/23/22  MET     1.Pt to increase strength by a 1/2 grade of muscles test to allow for improvement in functional activities such as performing chores.  MET  2.Pt to improve range of motion by 50% to allow for improved functional mobility to allow for improvement in IADLs.   3.Pt to report compliance with HEP and demonstrate proper exercise technique to PT to show competence with self management of condition.  4.Decrease pain by 50% during functional activities.  MET     Long Term Goals (16 Weeks):   In progress     1. Increase ROM to allow improved joint biomechanics during functional activities.   2.Increase trunk and lower  extremity strength to within normal limits during functional activities.   3. Independent with home exercise program.   4. Full return to functional activities with manageable complaints.  5. Patient to demonstrate improved posture and body mechanics.  6. Decrease pain by 75% during functional activities.    PLAN     Continue with established  PT Plan of Care towards patient goals.  Progress functional strengthening next visit.      Steve Joyce, PT  12/07/2022

## 2022-12-09 ENCOUNTER — PATIENT MESSAGE (OUTPATIENT)
Dept: FAMILY MEDICINE | Facility: CLINIC | Age: 72
End: 2022-12-09
Payer: MEDICARE

## 2022-12-16 ENCOUNTER — CLINICAL SUPPORT (OUTPATIENT)
Dept: REHABILITATION | Facility: HOSPITAL | Age: 72
End: 2022-12-16
Payer: MEDICARE

## 2022-12-16 DIAGNOSIS — M25.611 DECREASED RANGE OF MOTION OF RIGHT SHOULDER: Primary | ICD-10-CM

## 2022-12-16 DIAGNOSIS — R29.898 IMPAIRED STRENGTH OF SHOULDER MUSCLES: ICD-10-CM

## 2022-12-16 PROCEDURE — 97110 THERAPEUTIC EXERCISES: CPT | Mod: PN

## 2022-12-16 PROCEDURE — 97140 MANUAL THERAPY 1/> REGIONS: CPT | Mod: PN

## 2022-12-16 NOTE — PROGRESS NOTES
[facility]   Physical Therapy Daily Treatment Note     Name: Driss Hernandez Jr.  Clinic Number: [id number]    Therapy Diagnosis:   Encounter Diagnoses   Name Primary?    Decreased range of motion of right shoulder Yes    Impaired strength of shoulder muscles          Physician: Allison Hemphill PA-C    Visit Date: 12/16/2022       Physician Orders: PT Eval and Treat   Medical Diagnosis from Referral: M75.112 (ICD-10-CM) - Nontraumatic incomplete tear of left rotator cuff  Evaluation Date: 7/8/2022  Plan of Care Expiration: 12/31/2022    Authorization Period Expiration: 7/5/23  Visit # / Visits authorized: 11/ 20    Precautions: Standard, s/p Right partial thickness RTC repair with biceps tenodesis     DOS: 7-5-22       Time In: 0815  Time Out: 0916    Total Billable Time: 55 minutes      SUBJECTIVE     Pt reports: the shoulder continues to get stronger.  States he still has some pain from time to time.     He was somewhat compliant with home exercise program.  Response to previous treatment: fair  Functional change: improvements with ADL's.     Pain: 3/10  Location: right shoulder       OBJECTIVE      TREATMENT     Driss received the treatments listed below:      received therapeutic exercises to develop strength and ROM for 45 minutes including:     UBE x 6 min  Pulley x 4 min   ER/IR green tubing 3 x 10  Wall slides with pillow case x 2 x 10: np  supine wand ER x 15 5lbs  Supine wand press-up 3 x 10 5 lbs  Supine wand flexion x 10 5lbs  Bilateral shoulder ext/rows RTB 3 x 10  Sidleying ER 2lb 3 x 10  4 lbs ball on wall, wax on, wax off x 30  Wall clock YTB 2 x 10  Standing scaption 2lbs 2 x 10  Matrix rows 45 lbs 3 x 10    received the following manual therapy techniques: Soft tissue Mobilization were applied to the: R shoulder for 10 minutes, including:  PROM GHJ in supine - all planes; gr III GHJ posterior/inferior mobs,    GHJ oscillation;     CP post treatment x 10 right  shoulder post treatment.     PATIENT EDUCATION AND HOME EXERCISES     Education provided:   HEP compliance     Written Home Exercises Provided: yes.     Exercises were reviewed and Driss was able to demonstrate them prior to the end of the session.  Driss demonstrated good  understanding of the education provided.      See EMR under Patient Instructions for exercises provided 7/8/2022.    ASSESSMENT     Good performance with exercise progression consisting of GH stabilization and AAROM activities.  Improved shoulder strength in all planes. Improved performance avoiding scapular compensation with forward Gh elevation. Driss Is progressing fair towards his goals.     Pt prognosis is Good.     Pt will continue to benefit from skilled outpatient physical therapy to address the deficits listed in the problem list box on initial evaluation, provide pt/family education and to maximize pt's level of independence in the home and community environment.     Pt's spiritual, cultural and educational needs considered and pt agreeable to plan of care and goals.     Anticipated barriers to physical therapy: none    Short Term Goals (8 Weeks):  Updated  11/23/22  MET     1.Pt to increase strength by a 1/2 grade of muscles test to allow for improvement in functional activities such as performing chores.  MET  2.Pt to improve range of motion by 50% to allow for improved functional mobility to allow for improvement in IADLs.   3.Pt to report compliance with HEP and demonstrate proper exercise technique to PT to show competence with self management of condition.  4.Decrease pain by 50% during functional activities.  MET     Long Term Goals (16 Weeks):   In progress     1. Increase ROM to allow improved joint biomechanics during functional activities.   2.Increase trunk and lower extremity strength to within normal limits during functional activities.   3. Independent with home exercise program.   4. Full return to functional activities  with manageable complaints.  5. Patient to demonstrate improved posture and body mechanics.  6. Decrease pain by 75% during functional activities.    PLAN     Continue with established  PT Plan of Care towards patient goals.  Progress functional strengthening next visit.      Steve Joyce, PT  12/16/2022                                            [facility]   Physical Therapy Daily Treatment Note     Name: Driss Hernandez Jr.  Clinic Number: [id number]    Therapy Diagnosis:   Encounter Diagnoses   Name Primary?    Decreased range of motion of right shoulder Yes    Impaired strength of shoulder muscles          Physician: Allison Hemphill PA-C    Visit Date: 12/16/2022       Physician Orders: PT Eval and Treat   Medical Diagnosis from Referral: M75.112 (ICD-10-CM) - Nontraumatic incomplete tear of left rotator cuff  Evaluation Date: 7/8/2022  Plan of Care Expiration: 12/31/2022    Authorization Period Expiration: 7/5/23  Visit # / Visits authorized: 11/ 20    Precautions: Standard, s/p Right partial thickness RTC repair with biceps tenodesis     POSTOPERATIVE PLAN: Plan to follow [facility] rehab protocol + biceps tenodesis. AROM of elbow ok, no resistive elbow exercises until 8     DOS: 7-5-22         POW: 16 weeks    Time In: 0815  Time Out: 0916    Total Billable Time: 30 minutes      SUBJECTIVE     Pt reports: the shoulder continues to get stronger.     He was somewhat compliant with home exercise program.  Response to previous treatment: fair  Functional change: improvements with ADL's.     Pain: 3/10  Location: right shoulder       OBJECTIVE      TREATMENT     Driss received the treatments listed below:      received therapeutic exercises to develop strength and ROM for 45 minutes including:     UBE x 6 min  Pulley x 4 min   ER/IR green tubing 3 x 10  Wall slides with pillow case x 2 x 10: np  supine wand ER x 15 5lbs  Supine wand press-up 3 x 10 5 lbs  Supine wand flexion x  10 5lbs  Bilateral shoulder ext/rows RTB 3 x 10  Sidleying ER 2lb 3 x 10  4 lbs ball on wall, wax on, wax off x 30  Wall clock YTB 2 x 10  Wall climb with lift off x 10: np  Standing scaption 2lbs 2 x 10  Matrix rows 45 lbs 3 x 10    received the following manual therapy techniques: Soft tissue Mobilization were applied to the: R shoulder for 10 minutes, including:  PROM GHJ in supine - all planes; gr III GHJ posterior/inferior mobs,    GHJ oscillation;     CP post treatment x 10 right shoulder post treatment.     PATIENT EDUCATION AND HOME EXERCISES     Education provided:   HEP compliance     Written Home Exercises Provided: yes.     Exercises were reviewed and Driss was able to demonstrate them prior to the end of the session.  Driss demonstrated good  understanding of the education provided.      See EMR under Patient Instructions for exercises provided 7/8/2022.    ASSESSMENT     Good performance with exercise progression consisting of GH stabilization and AAROM activities.  No c/o increased discomfort with prescribed activities. Improved performance avoiding scapular compensation with forward Gh elevation.   Driss Is progressing fair towards his goals.     Pt prognosis is Good.     Pt will continue to benefit from skilled outpatient physical therapy to address the deficits listed in the problem list box on initial evaluation, provide pt/family education and to maximize pt's level of independence in the home and community environment.     Pt's spiritual, cultural and educational needs considered and pt agreeable to plan of care and goals.     Anticipated barriers to physical therapy: none    Short Term Goals (8 Weeks):  Updated  11/23/22  MET     1.Pt to increase strength by a 1/2 grade of muscles test to allow for improvement in functional activities such as performing chores.  MET  2.Pt to improve range of motion by 50% to allow for improved functional mobility to allow for improvement in IADLs.   3.Pt to  report compliance with HEP and demonstrate proper exercise technique to PT to show competence with self management of condition.  4.Decrease pain by 50% during functional activities.  MET     Long Term Goals (16 Weeks):   In progress     1. Increase ROM to allow improved joint biomechanics during functional activities.   2.Increase trunk and lower extremity strength to within normal limits during functional activities.   3. Independent with home exercise program.   4. Full return to functional activities with manageable complaints.  5. Patient to demonstrate improved posture and body mechanics.  6. Decrease pain by 75% during functional activities.    PLAN     Continue with established  PT Plan of Care towards patient goals.  Progress functional strengthening next visit.      Steve Joyce, PT  12/16/2022

## 2022-12-21 ENCOUNTER — CLINICAL SUPPORT (OUTPATIENT)
Dept: REHABILITATION | Facility: HOSPITAL | Age: 72
End: 2022-12-21
Payer: MEDICARE

## 2022-12-21 DIAGNOSIS — R29.898 IMPAIRED STRENGTH OF SHOULDER MUSCLES: ICD-10-CM

## 2022-12-21 DIAGNOSIS — M25.611 DECREASED RANGE OF MOTION OF RIGHT SHOULDER: Primary | ICD-10-CM

## 2022-12-21 PROCEDURE — 97110 THERAPEUTIC EXERCISES: CPT | Mod: PN

## 2022-12-21 NOTE — PROGRESS NOTES
[facility]   Physical Therapy Daily Treatment Note     Name: Driss Hernandez Jr.  Clinic Number: [id number]    Therapy Diagnosis:   Encounter Diagnoses   Name Primary?    Decreased range of motion of right shoulder Yes    Impaired strength of shoulder muscles          Physician: Allison Hemphill PA-C    Visit Date: 12/21/2022       Physician Orders: PT Eval and Treat   Medical Diagnosis from Referral: M75.112 (ICD-10-CM) - Nontraumatic incomplete tear of left rotator cuff  Evaluation Date: 7/8/2022  Plan of Care Expiration: 12/31/2022    Authorization Period Expiration: 7/5/23  Visit # / Visits authorized: 11/ 20    Precautions: Standard, s/p Right partial thickness RTC repair with biceps tenodesis     DOS: 7-5-22       Time In: 0815  Time Out: 0916    Total Billable Time: 60 minutes      SUBJECTIVE     Pt reports: the shoulder continues to improve with therapy.     He was somewhat compliant with home exercise program.  Response to previous treatment: fair  Functional change: improvements with ADL's.     Pain: 3/10  Location: right shoulder       OBJECTIVE      TREATMENT     Driss received the treatments listed below:      received therapeutic exercises to develop strength and ROM for 55 minutes including:     UBE x 6 min  Pulley x 4 min   ER/IR green tubing 3 x 10  Wall slides with pillow case x 2 x 10: np  supine wand ER x 15 5lbs  Supine wand press-up 3 x 10 5 lbs  Supine wand flexion x 10 5lbs  Bilateral shoulder ext/rows RTB 3 x 10  Sidleying ER 2lb 3 x 10  4 lbs ball on wall, wax on, wax off x 30  Wall clock YTB 2 x 10  Standing scaption 2lbs 2 x 10  Matrix rows 40 lbs 3 x 10  Body blade 30 sec x 2 @ 0 deg and scaption    received the following manual therapy techniques: Soft tissue Mobilization were applied to the: R shoulder for 5 minutes, including:  PROM GHJ in supine - all planes; gr III GHJ posterior/inferior mobs,    GHJ oscillation;     CP post treatment x 10 right  shoulder post treatment.     PATIENT EDUCATION AND HOME EXERCISES     Education provided:   HEP compliance     Written Home Exercises Provided: yes.     Exercises were reviewed and Driss was able to demonstrate them prior to the end of the session.  Driss demonstrated good  understanding of the education provided.      See EMR under Patient Instructions for exercises provided 7/8/2022.    ASSESSMENT     Good performance with exercise progression consisting of GH stabilization and AAROM activities. No c/o increased discomfort with prescribed activities.  Improved GH elevation avoiding scapular compensation. Driss Is progressing fair towards his goals.     Pt prognosis is Good.     Pt will continue to benefit from skilled outpatient physical therapy to address the deficits listed in the problem list box on initial evaluation, provide pt/family education and to maximize pt's level of independence in the home and community environment.     Pt's spiritual, cultural and educational needs considered and pt agreeable to plan of care and goals.     Anticipated barriers to physical therapy: none    Short Term Goals (8 Weeks):  Updated  11/23/22  MET     1.Pt to increase strength by a 1/2 grade of muscles test to allow for improvement in functional activities such as performing chores.  MET  2.Pt to improve range of motion by 50% to allow for improved functional mobility to allow for improvement in IADLs.   3.Pt to report compliance with HEP and demonstrate proper exercise technique to PT to show competence with self management of condition.  4.Decrease pain by 50% during functional activities.  MET     Long Term Goals (16 Weeks):   In progress     1. Increase ROM to allow improved joint biomechanics during functional activities.   2.Increase trunk and lower extremity strength to within normal limits during functional activities.   3. Independent with home exercise program.   4. Full return to functional activities with  manageable complaints.  5. Patient to demonstrate improved posture and body mechanics.  6. Decrease pain by 75% during functional activities.    PLAN     Continue with established  PT Plan of Care towards patient goals.  Progress functional strengthening next visit.      Steve Joyce, PT  12/21/2022

## 2022-12-28 ENCOUNTER — CLINICAL SUPPORT (OUTPATIENT)
Dept: REHABILITATION | Facility: HOSPITAL | Age: 72
End: 2022-12-28
Payer: MEDICARE

## 2022-12-28 DIAGNOSIS — R29.898 IMPAIRED STRENGTH OF SHOULDER MUSCLES: ICD-10-CM

## 2022-12-28 DIAGNOSIS — M25.611 DECREASED RANGE OF MOTION OF RIGHT SHOULDER: Primary | ICD-10-CM

## 2022-12-28 PROCEDURE — 97110 THERAPEUTIC EXERCISES: CPT | Mod: PN

## 2022-12-28 NOTE — PLAN OF CARE
[facility]   Physical Therapy Progress Note     Name: Driss Hernandez Jr.  Clinic Number: [id number]    Therapy Diagnosis:   Encounter Diagnoses   Name Primary?    Decreased range of motion of right shoulder Yes    Impaired strength of shoulder muscles          Physician: Allison Hemphill PA-C    Visit Date: 12/28/2022       Physician Orders: PT Eval and Treat   Medical Diagnosis from Referral: M75.112 (ICD-10-CM) - Nontraumatic incomplete tear of left rotator cuff  Evaluation Date: 7/8/2022  Plan of Care Expiration: 3/31/2023    Authorization Period Expiration: 7/5/23  Visit # / Visits authorized: 11/ 20    Precautions: Standard, s/p Right partial thickness RTC repair with biceps tenodesis     DOS: 7-5-22       Time In: 0815  Time Out: 0916    Total Billable Time: 60 minutes      SUBJECTIVE     Pt reports: the shoulder continues to improve with therapy. States he is getting stronger. States he is able to lift most things but carefully.     He was somewhat compliant with home exercise program.  Response to previous treatment: fair  Functional change: improvements with ADL's.     Pain: 3/10  Location: right shoulder       OBJECTIVE        Shoulder Range of Motion:   ACTIVE ROM LEFT RIGHT   Flexion 150 135   Abduction 140 125   IR Functional T12 34   ER Functional T2 85      PASSIVE ROM LEFT RIGHT   Flexion    Abduction       STRENGTH LEFT RIGHT   Flexion 5/5 4   Abduction 4/5 3+   Extension NT NT   IR at 90 4/5 4   ER at 90 4/5 3+   Elbow Flexion 5/5 NT      TREATMENT     Driss received the treatments listed below:      received therapeutic exercises to develop strength and ROM for 55 minutes including:     UBE x 6 min  Pulley x 4 min   ER/IR green tubing 3 x 10  Wall slides with pillow case x 2 x 10: np  supine wand ER x 15 5lbs  Supine wand press-up 3 x 10 5 lbs  Supine wand flexion x 10 5lbs  Bilateral shoulder ext/rows RTB 3 x 10  Sidleying ER 2lb 3 x 10  4 lbs ball on  wall, wax on, wax off x 30  Wall clock YTB 2 x 10  Standing scaption 2lbs 2 x 10  Matrix rows 40 lbs 3 x 10  Body blade 30 sec x 2 @ 0 deg and scaption    received the following manual therapy techniques: Soft tissue Mobilization were applied to the: R shoulder for 5 minutes, including:  PROM GHJ in supine - all planes; gr III GHJ posterior/inferior mobs,    GHJ oscillation;     CP post treatment x 10 right shoulder post treatment.     PATIENT EDUCATION AND HOME EXERCISES     Education provided:   HEP compliance     Written Home Exercises Provided: yes.     Exercises were reviewed and Driss was able to demonstrate them prior to the end of the session.  Driss demonstrated good  understanding of the education provided.      See EMR under Patient Instructions for exercises provided 7/8/2022.    ASSESSMENT      No sign of scapular compensation with forward GH elevation. No c/o increased discomfort with prescribed activities. Good performance with exercise progression consisting of GH stabilization and AAROM activities.  Driss Is progressing fair towards his goals.     Pt prognosis is Good.     Pt will continue to benefit from skilled outpatient physical therapy to address the deficits listed in the problem list box on initial evaluation, provide pt/family education and to maximize pt's level of independence in the home and community environment.     Pt's spiritual, cultural and educational needs considered and pt agreeable to plan of care and goals.     Anticipated barriers to physical therapy: none      Long Term Goals (16 Weeks):   In progress, partially MET. Updated 12/28/23     1. Increase ROM to allow improved joint biomechanics during functional activities.   2.Increase trunk and lower extremity strength to within normal limits during functional activities.   3. Independent with home exercise program.   4. Full return to functional activities with manageable complaints.  5. Patient to demonstrate improved posture and  body mechanics.  6. Decrease pain by 75% during functional activities.    PLAN     Recommended Treatment Plan: 1x times per week for 12 weeks with treatments to consist of:  Neuromuscular and postural re-education,  training, therapeutic exercise, therapeutic activities,balance training, gait training, manual therapy, soft tissue mobilization, ROM exercises, Cardiovascular,  Postural stabilization, manual traction, spinal mobilization, moist heat, cryotherapy, electrical stimulation, ultrasound, home exercise education and planning.     Continue with established  PT Plan of Care towards patient goals.  Progress functional strengthening next visit.      Steve Joyce, PT  12/28/2022

## 2022-12-28 NOTE — PROGRESS NOTES
[facility]   Physical Therapy Progress Note     Name: Driss Hernandez Jr.  Clinic Number: [id number]    Therapy Diagnosis:   Encounter Diagnoses   Name Primary?    Decreased range of motion of right shoulder Yes    Impaired strength of shoulder muscles          Physician: Allison Hemphill PA-C    Visit Date: 12/28/2022       Physician Orders: PT Eval and Treat   Medical Diagnosis from Referral: M75.112 (ICD-10-CM) - Nontraumatic incomplete tear of left rotator cuff  Evaluation Date: 7/8/2022  Plan of Care Expiration: 3/31/2023    Authorization Period Expiration: 7/5/23  Visit # / Visits authorized: 11/ 20    Precautions: Standard, s/p Right partial thickness RTC repair with biceps tenodesis     DOS: 7-5-22       Time In: 0815  Time Out: 0916    Total Billable Time: 60 minutes      SUBJECTIVE     Pt reports: the shoulder continues to improve with therapy. States he is getting stronger. States he is able to lift most things but carefully.     He was somewhat compliant with home exercise program.  Response to previous treatment: fair  Functional change: improvements with ADL's.     Pain: 3/10  Location: right shoulder       OBJECTIVE        Shoulder Range of Motion:   ACTIVE ROM LEFT RIGHT   Flexion 150 135   Abduction 140 125   IR Functional T12 34   ER Functional T2 85      PASSIVE ROM LEFT RIGHT   Flexion    Abduction       STRENGTH LEFT RIGHT   Flexion 5/5 4   Abduction 4/5 3+   Extension NT NT   IR at 90 4/5 4   ER at 90 4/5 3+   Elbow Flexion 5/5 NT      TREATMENT     Driss received the treatments listed below:      received therapeutic exercises to develop strength and ROM for 55 minutes including:     UBE x 6 min  Pulley x 4 min   ER/IR green tubing 3 x 10  Wall slides with pillow case x 2 x 10: np  supine wand ER x 15 5lbs  Supine wand press-up 3 x 10 5 lbs  Supine wand flexion x 10 5lbs  Bilateral shoulder ext/rows RTB 3 x 10  Sidleying ER 2lb 3 x 10  4 lbs ball on  wall, wax on, wax off x 30  Wall clock YTB 2 x 10  Standing scaption 2lbs 2 x 10  Matrix rows 40 lbs 3 x 10  Body blade 30 sec x 2 @ 0 deg and scaption    received the following manual therapy techniques: Soft tissue Mobilization were applied to the: R shoulder for 5 minutes, including:  PROM GHJ in supine - all planes; gr III GHJ posterior/inferior mobs,    GHJ oscillation;     CP post treatment x 10 right shoulder post treatment.     PATIENT EDUCATION AND HOME EXERCISES     Education provided:   HEP compliance     Written Home Exercises Provided: yes.     Exercises were reviewed and Driss was able to demonstrate them prior to the end of the session.  Driss demonstrated good  understanding of the education provided.      See EMR under Patient Instructions for exercises provided 7/8/2022.    ASSESSMENT      No sign of scapular compensation with forward GH elevation. No c/o increased discomfort with prescribed activities. Good performance with exercise progression consisting of GH stabilization and AAROM activities. . Driss Is progressing fair towards his goals.     Pt prognosis is Good.     Pt will continue to benefit from skilled outpatient physical therapy to address the deficits listed in the problem list box on initial evaluation, provide pt/family education and to maximize pt's level of independence in the home and community environment.     Pt's spiritual, cultural and educational needs considered and pt agreeable to plan of care and goals.     Anticipated barriers to physical therapy: none      Long Term Goals (16 Weeks):   In progress, partially MET. Updated 12/28/23     1. Increase ROM to allow improved joint biomechanics during functional activities.   2.Increase trunk and lower extremity strength to within normal limits during functional activities.   3. Independent with home exercise program.   4. Full return to functional activities with manageable complaints.  5. Patient to demonstrate improved posture  and body mechanics.  6. Decrease pain by 75% during functional activities.    PLAN     Recommended Treatment Plan: 1x times per week for 12 weeks with treatments to consist of:  Neuromuscular and postural re-education,  training, therapeutic exercise, therapeutic activities,balance training, gait training, manual therapy, soft tissue mobilization, ROM exercises, Cardiovascular,  Postural stabilization, manual traction, spinal mobilization, moist heat, cryotherapy, electrical stimulation, ultrasound, home exercise education and planning.     Continue with established  PT Plan of Care towards patient goals.  Progress functional strengthening next visit.      Steve Joyce, PT  12/28/2022

## 2023-01-03 ENCOUNTER — CLINICAL SUPPORT (OUTPATIENT)
Dept: REHABILITATION | Facility: HOSPITAL | Age: 73
End: 2023-01-03
Payer: MEDICARE

## 2023-01-03 DIAGNOSIS — M25.611 DECREASED RANGE OF MOTION OF RIGHT SHOULDER: Primary | ICD-10-CM

## 2023-01-03 DIAGNOSIS — R29.898 IMPAIRED STRENGTH OF SHOULDER MUSCLES: ICD-10-CM

## 2023-01-03 PROCEDURE — 97110 THERAPEUTIC EXERCISES: CPT | Mod: PN

## 2023-01-03 PROCEDURE — 97140 MANUAL THERAPY 1/> REGIONS: CPT | Mod: PN

## 2023-01-03 NOTE — PROGRESS NOTES
[facility]   Physical Therapy Progress Note     Name: Driss Hernandez Jr.  Clinic Number: [id number]    Therapy Diagnosis:   Encounter Diagnoses   Name Primary?    Decreased range of motion of right shoulder Yes    Impaired strength of shoulder muscles          Physician: Allison Hemphill PA-C    Visit Date: 1/3/2023       Physician Orders: PT Eval and Treat   Medical Diagnosis from Referral: M75.112 (ICD-10-CM) - Nontraumatic incomplete tear of left rotator cuff  Evaluation Date: 7/8/2022  Plan of Care Expiration: 3/31/2023    Authorization Period Expiration: 7/5/23  Visit # / Visits authorized: 11/ 20    Precautions: Standard, s/p Right partial thickness RTC repair with biceps tenodesis     DOS: 7-5-22       Time In: 0945  Time Out: 1040    Total Billable Time: 55 minutes      SUBJECTIVE     Pt reports: the shoulder has been a little sore the past week but states that is to be expected.      He was somewhat compliant with home exercise program.  Response to previous treatment: good  Functional change: improvements with ADL's.     Pain: 3/10  Location: right shoulder       OBJECTIVE        TREATMENT     Driss received the treatments listed below:      received therapeutic exercises to develop strength and ROM for 55 minutes including:     UBE x 6 min  Pulley x 4 min   ER/IR green tubing 3 x 10  Wall slides with pillow case x 2 x 10: np  supine wand ER x 15 5lbs  Supine wand press-up 3 x 10 5 lbs  Supine wand flexion x 10 5lbs  Bilateral shoulder ext/rows RTB 3 x 10  Sidleying ER 2lb 3 x 10  4 lbs ball on wall, wax on, wax off x 30  Wall clock YTB 2 x 10  Standing scaption 2lbs 2 x 10  Matrix rows 40 lbs 3 x 10  Body blade 30 sec x 2 @ 0 deg and scaption    received the following manual therapy techniques: Soft tissue Mobilization were applied to the: R shoulder for 5 minutes, including:  PROM GHJ in supine - all planes; gr III GHJ posterior/inferior mobs,    GHJ oscillation;     CP  post treatment x 10 right shoulder post treatment.     PATIENT EDUCATION AND HOME EXERCISES     Education provided:   HEP compliance     Written Home Exercises Provided: yes.     Exercises were reviewed and Driss was able to demonstrate them prior to the end of the session.  Driss demonstrated good  understanding of the education provided.      See EMR under Patient Instructions for exercises provided 7/8/2022.    ASSESSMENT    No c/o increased discomfort with prescribed activities.  Demonstrates improved GH ROM and strength in all planes. Good performance with exercise progression consisting of GH stabilization and AAROM activities.  Driss Is progressing well  towards his goals.     Pt prognosis is Good.     Pt will continue to benefit from skilled outpatient physical therapy to address the deficits listed in the problem list box on initial evaluation, provide pt/family education and to maximize pt's level of independence in the home and community environment.     Pt's spiritual, cultural and educational needs considered and pt agreeable to plan of care and goals.     Anticipated barriers to physical therapy: none      Long Term Goals (16 Weeks):   In progress, partially MET. Updated 12/28/23     1. Increase ROM to allow improved joint biomechanics during functional activities.   2.Increase trunk and lower extremity strength to within normal limits during functional activities.   3. Independent with home exercise program.   4. Full return to functional activities with manageable complaints.  5. Patient to demonstrate improved posture and body mechanics.  6. Decrease pain by 75% during functional activities.    PLAN     Recommended Treatment Plan: 1x times per week for 12 weeks with treatments to consist of:  Neuromuscular and postural re-education,  training, therapeutic exercise, therapeutic activities,balance training, gait training, manual therapy, soft tissue mobilization, ROM exercises,  Cardiovascular,  Postural stabilization, manual traction, spinal mobilization, moist heat, cryotherapy, electrical stimulation, ultrasound, home exercise education and planning.     Continue with established  PT Plan of Care towards patient goals.  Progress functional strengthening next visit.      Steve Joyce, PT  01/03/2023

## 2023-01-10 ENCOUNTER — CLINICAL SUPPORT (OUTPATIENT)
Dept: REHABILITATION | Facility: HOSPITAL | Age: 73
End: 2023-01-10
Payer: MEDICARE

## 2023-01-10 DIAGNOSIS — M25.611 DECREASED RANGE OF MOTION OF RIGHT SHOULDER: Primary | ICD-10-CM

## 2023-01-10 DIAGNOSIS — R29.898 IMPAIRED STRENGTH OF SHOULDER MUSCLES: ICD-10-CM

## 2023-01-10 PROCEDURE — 97140 MANUAL THERAPY 1/> REGIONS: CPT | Mod: PN

## 2023-01-10 PROCEDURE — 97110 THERAPEUTIC EXERCISES: CPT | Mod: PN

## 2023-01-10 NOTE — PROGRESS NOTES
[facility]   Physical Therapy Progress Note     Name: Driss Hernandez Jr.  Clinic Number: [id number]    Therapy Diagnosis:   Encounter Diagnoses   Name Primary?    Decreased range of motion of right shoulder Yes    Impaired strength of shoulder muscles          Physician: Allison Hemphill PA-C    Visit Date: 1/10/2023       Physician Orders: PT Eval and Treat   Medical Diagnosis from Referral: M75.112 (ICD-10-CM) - Nontraumatic incomplete tear of left rotator cuff  Evaluation Date: 7/8/2022  Plan of Care Expiration: 3/31/2023    Authorization Period Expiration: 7/5/23  Visit # / Visits authorized: 11/ 20    Precautions: Standard, s/p Right partial thickness RTC repair with biceps tenodesis     DOS: 7-5-22       Time In: 0945  Time Out: 1040    Total Billable Time: 55 minutes      SUBJECTIVE     Pt reports: c/o pain at night. Not sure if he slept wrong.     He was somewhat compliant with home exercise program.  Response to previous treatment: good  Functional change: improvements with ADL's.     Pain: 3/10  Location: right shoulder       OBJECTIVE        TREATMENT     Driss received the treatments listed below:      received therapeutic exercises to develop strength and ROM for 55 minutes including:     UBE x 6 min  Pulley x 4 min   ER/IR green tubing 3 x 10  Wall slides with pillow case x 2 x 10: np  supine wand ER x 15 5lbs  Supine wand press-up 3 x 10 5 lbs  Supine wand flexion x 10 5lbs  Bilateral shoulder ext/rows RTB 3 x 10  Sidleying ER 2lb 3 x 10  4 lbs ball on wall, wax on, wax off x 30  Wall clock YTB 2 x 10  Standing scaption 2lbs 2 x 10  Matrix rows 40 lbs 3 x 10  Body blade 30 sec x 2 @ 0 deg and scaption    received the following manual therapy techniques: Soft tissue Mobilization were applied to the: R shoulder for 5 minutes, including:  PROM GHJ in supine - all planes; gr III GHJ posterior/inferior mobs,    GHJ oscillation;     CP post treatment x 10 right shoulder post  treatment.     PATIENT EDUCATION AND HOME EXERCISES     Education provided:   HEP compliance     Written Home Exercises Provided: yes.     Exercises were reviewed and Driss was able to demonstrate them prior to the end of the session.  Driss demonstrated good  understanding of the education provided.      See EMR under Patient Instructions for exercises provided 7/8/2022.    ASSESSMENT      Good performance with exercise progression consisting of GH stabilization and AAROM activities.  Pt demonstrates appropriate response to advanced RTC and scapular stabilization therex. No c/o increased discomfort with prescribed activities.  Driss Is progressing well  towards his goals.     Pt prognosis is Good.     Pt will continue to benefit from skilled outpatient physical therapy to address the deficits listed in the problem list box on initial evaluation, provide pt/family education and to maximize pt's level of independence in the home and community environment.     Pt's spiritual, cultural and educational needs considered and pt agreeable to plan of care and goals.     Anticipated barriers to physical therapy: none      Long Term Goals (16 Weeks):   In progress, partially MET. Updated 12/28/23     1. Increase ROM to allow improved joint biomechanics during functional activities.   2.Increase trunk and lower extremity strength to within normal limits during functional activities.   3. Independent with home exercise program.   4. Full return to functional activities with manageable complaints.  5. Patient to demonstrate improved posture and body mechanics.  6. Decrease pain by 75% during functional activities.    PLAN     Continue with established  PT Plan of Care towards patient goals.  Progress functional strengthening next visit.      Steve Joyce, PT  01/10/2023                                                                [facility]   Physical Therapy Progress Note     Name: Driss Gomez  Mary FerminCherise  Clinic Number: [id number]    Therapy Diagnosis:   Encounter Diagnoses   Name Primary?    Decreased range of motion of right shoulder Yes    Impaired strength of shoulder muscles          Physician: Allison Hemphill PA-C    Visit Date: 1/10/2023       Physician Orders: PT Eval and Treat   Medical Diagnosis from Referral: M75.112 (ICD-10-CM) - Nontraumatic incomplete tear of left rotator cuff  Evaluation Date: 7/8/2022  Plan of Care Expiration: 3/31/2023    Authorization Period Expiration: 7/5/23  Visit # / Visits authorized: 11/ 20    Precautions: Standard, s/p Right partial thickness RTC repair with biceps tenodesis     DOS: 7-5-22       Time In: 0945  Time Out: 1040    Total Billable Time: 55 minutes      SUBJECTIVE     Pt reports: the shoulder has been a little sore the past week but states that is to be expected.      He was somewhat compliant with home exercise program.  Response to previous treatment: good  Functional change: improvements with ADL's.     Pain: 3/10  Location: right shoulder       OBJECTIVE        TREATMENT     Driss received the treatments listed below:      received therapeutic exercises to develop strength and ROM for 55 minutes including:     UBE x 6 min  Pulley x 4 min   ER/IR green tubing 3 x 10  Wall slides with pillow case x 2 x 10: np  supine wand ER x 15 5lbs  Supine wand press-up 3 x 10 5 lbs  Supine wand flexion x 10 5lbs  Bilateral shoulder ext/rows RTB 3 x 10  Sidleying ER 2lb 3 x 10  4 lbs ball on wall, wax on, wax off x 30  Wall clock YTB 2 x 10  Standing scaption 2lbs 2 x 10  Matrix rows 40 lbs 3 x 10  Body blade 30 sec x 2 @ 0 deg and scaption    received the following manual therapy techniques: Soft tissue Mobilization were applied to the: R shoulder for 5 minutes, including:  PROM GHJ in supine - all planes; gr III GHJ posterior/inferior mobs,    GHJ oscillation;     CP post treatment x 10 right shoulder post treatment.     PATIENT EDUCATION AND HOME EXERCISES      Education provided:   HEP compliance     Written Home Exercises Provided: yes.     Exercises were reviewed and Driss was able to demonstrate them prior to the end of the session.  Driss demonstrated good  understanding of the education provided.      See EMR under Patient Instructions for exercises provided 7/8/2022.    ASSESSMENT    No c/o increased discomfort with prescribed activities.  Demonstrates improved GH ROM and strength in all planes. Good performance with exercise progression consisting of GH stabilization and AAROM activities.  Driss Is progressing well  towards his goals.     Pt prognosis is Good.     Pt will continue to benefit from skilled outpatient physical therapy to address the deficits listed in the problem list box on initial evaluation, provide pt/family education and to maximize pt's level of independence in the home and community environment.     Pt's spiritual, cultural and educational needs considered and pt agreeable to plan of care and goals.     Anticipated barriers to physical therapy: none      Long Term Goals (16 Weeks):   In progress, partially MET. Updated 12/28/23     1. Increase ROM to allow improved joint biomechanics during functional activities.   2.Increase trunk and lower extremity strength to within normal limits during functional activities.   3. Independent with home exercise program.   4. Full return to functional activities with manageable complaints.  5. Patient to demonstrate improved posture and body mechanics.  6. Decrease pain by 75% during functional activities.    PLAN     Recommended Treatment Plan: 1x times per week for 12 weeks with treatments to consist of:  Neuromuscular and postural re-education,  training, therapeutic exercise, therapeutic activities,balance training, gait training, manual therapy, soft tissue mobilization, ROM exercises, Cardiovascular,  Postural stabilization, manual traction, spinal mobilization, moist heat, cryotherapy,  electrical stimulation, ultrasound, home exercise education and planning.     Continue with established  PT Plan of Care towards patient goals.  Progress functional strengthening next visit.      Steve Joyce, PT  01/10/2023

## 2023-01-11 ENCOUNTER — PES CALL (OUTPATIENT)
Dept: ADMINISTRATIVE | Facility: CLINIC | Age: 73
End: 2023-01-11
Payer: MEDICARE

## 2023-01-17 ENCOUNTER — CLINICAL SUPPORT (OUTPATIENT)
Dept: REHABILITATION | Facility: HOSPITAL | Age: 73
End: 2023-01-17
Payer: MEDICARE

## 2023-01-17 DIAGNOSIS — R29.898 IMPAIRED STRENGTH OF SHOULDER MUSCLES: ICD-10-CM

## 2023-01-17 DIAGNOSIS — M25.611 DECREASED RANGE OF MOTION OF RIGHT SHOULDER: Primary | ICD-10-CM

## 2023-01-17 PROCEDURE — 97110 THERAPEUTIC EXERCISES: CPT | Mod: PN

## 2023-01-17 NOTE — PROGRESS NOTES
[facility]   Physical Therapy Progress Note     Name: Driss Hernandez Jr.  Clinic Number: [id number]    Therapy Diagnosis:   Encounter Diagnoses   Name Primary?    Decreased range of motion of right shoulder Yes    Impaired strength of shoulder muscles          Physician: Allison Hemphill PA-C    Visit Date: 1/17/2023       Physician Orders: PT Eval and Treat   Medical Diagnosis from Referral: M75.112 (ICD-10-CM) - Nontraumatic incomplete tear of left rotator cuff  Evaluation Date: 7/8/2022  Plan of Care Expiration: 3/31/2023    Authorization Period Expiration: 7/5/23  Visit # / Visits authorized: 11/ 20    Precautions: Standard, s/p Right partial thickness RTC repair with biceps tenodesis     DOS: 7-5-22       Time In: 0945  Time Out: 1040    Total Billable Time: 35 minutes  (1:1)      SUBJECTIVE     Pt reports: the shoulder is much improved. States he returns to [place] in a couple of weeks.     He was somewhat compliant with home exercise program.  Response to previous treatment: good  Functional change: improvements with ADL's.     Pain: 3/10  Location: right shoulder       OBJECTIVE      TREATMENT     Driss received the treatments listed below:      received therapeutic exercises to develop strength and ROM for 50 minutes including:     UBE x 6 min  Pulley x 4 min   ER/IR green tubing 3 x 10  Wall slides with pillow case x 2 x 10: np  supine wand ER x 15 5lbs  Supine wand press-up 3 x 10 5 lbs  Supine wand flexion x 10 5lbs  Bilateral shoulder ext/rows RTB 3 x 10  Sidleying ER 2lb 3 x 10  4 lbs ball on wall, wax on, wax off x 30  Wall clock YTB 2 x 10  Standing scaption 2lbs 2 x 10  Matrix rows 40 lbs 3 x 10  Body blade 30 sec x 2 @ 0 deg and scaption    received the following manual therapy techniques: Soft tissue Mobilization were applied to the: R shoulder for 5 minutes, including:  PROM GHJ in supine - all planes; gr III GHJ posterior/inferior mobs,    GHJ oscillation;     CP post  treatment x 10 right shoulder post treatment.     PATIENT EDUCATION AND HOME EXERCISES     Education provided:   HEP compliance     Written Home Exercises Provided: yes.     Exercises were reviewed and Dirss was able to demonstrate them prior to the end of the session.  Driss demonstrated good  understanding of the education provided.      See EMR under Patient Instructions for exercises provided 7/8/2022.    ASSESSMENT      No c/o increased discomfort with prescribed activities.  Pt demonstrates appropriate response to advanced RTC and scapular stabilization therex. Demonstrates improved passive GH ROM in all planes.  Driss Is progressing well  towards his goals.     Pt prognosis is Good.     Pt will continue to benefit from skilled outpatient physical therapy to address the deficits listed in the problem list box on initial evaluation, provide pt/family education and to maximize pt's level of independence in the home and community environment.     Pt's spiritual, cultural and educational needs considered and pt agreeable to plan of care and goals.     Anticipated barriers to physical therapy: none      Long Term Goals (16 Weeks):   In progress, partially MET. Updated 12/28/23     1. Increase ROM to allow improved joint biomechanics during functional activities.   2.Increase trunk and lower extremity strength to within normal limits during functional activities.   3. Independent with home exercise program.   4. Full return to functional activities with manageable complaints.  5. Patient to demonstrate improved posture and body mechanics.  6. Decrease pain by 75% during functional activities.    PLAN     Continue with established  PT Plan of Care towards patient goals.  Progress functional strengthening next visit.      Steve Joyce, PT  01/17/2023                                                                [facility]   Physical Therapy Progress Note     Name: Driss Hernandez    Clinic Number: [id number]    Therapy Diagnosis:   Encounter Diagnoses   Name Primary?    Decreased range of motion of right shoulder Yes    Impaired strength of shoulder muscles          Physician: Allison Hemphill PA-C    Visit Date: 1/17/2023       Physician Orders: PT Eval and Treat   Medical Diagnosis from Referral: M75.112 (ICD-10-CM) - Nontraumatic incomplete tear of left rotator cuff  Evaluation Date: 7/8/2022  Plan of Care Expiration: 3/31/2023    Authorization Period Expiration: 7/5/23  Visit # / Visits authorized: 11/ 20    Precautions: Standard, s/p Right partial thickness RTC repair with biceps tenodesis     DOS: 7-5-22       Time In: 0945  Time Out: 1040    Total Billable Time: 55 minutes      SUBJECTIVE     Pt reports: the shoulder has been a little sore the past week but states that is to be expected.      He was somewhat compliant with home exercise program.  Response to previous treatment: good  Functional change: improvements with ADL's.     Pain: 3/10  Location: right shoulder       OBJECTIVE        TREATMENT     Driss received the treatments listed below:      received therapeutic exercises to develop strength and ROM for 55 minutes including:     UBE x 6 min  Pulley x 4 min   ER/IR green tubing 3 x 10  Wall slides with pillow case x 2 x 10: np  supine wand ER x 15 5lbs  Supine wand press-up 3 x 10 5 lbs  Supine wand flexion x 10 5lbs  Bilateral shoulder ext/rows RTB 3 x 10  Sidleying ER 2lb 3 x 10  4 lbs ball on wall, wax on, wax off x 30  Wall clock YTB 2 x 10  Standing scaption 2lbs 2 x 10  Matrix rows 40 lbs 3 x 10  Body blade 30 sec x 2 @ 0 deg and scaption    received the following manual therapy techniques: Soft tissue Mobilization were applied to the: R shoulder for 5 minutes, including:  PROM GHJ in supine - all planes; gr III GHJ posterior/inferior mobs,    GHJ oscillation;     CP post treatment x 10 right shoulder post treatment.     PATIENT EDUCATION AND HOME EXERCISES      Education provided:   HEP compliance     Written Home Exercises Provided: yes.     Exercises were reviewed and Driss was able to demonstrate them prior to the end of the session.  Driss demonstrated good  understanding of the education provided.      See EMR under Patient Instructions for exercises provided 7/8/2022.    ASSESSMENT    No c/o increased discomfort with prescribed activities.  Demonstrates improved GH ROM and strength in all planes. Good performance with exercise progression consisting of GH stabilization and AAROM activities.  Driss Is progressing well  towards his goals.     Pt prognosis is Good.     Pt will continue to benefit from skilled outpatient physical therapy to address the deficits listed in the problem list box on initial evaluation, provide pt/family education and to maximize pt's level of independence in the home and community environment.     Pt's spiritual, cultural and educational needs considered and pt agreeable to plan of care and goals.     Anticipated barriers to physical therapy: none      Long Term Goals (16 Weeks):   In progress, partially MET. Updated 12/28/23     1. Increase ROM to allow improved joint biomechanics during functional activities.   2.Increase trunk and lower extremity strength to within normal limits during functional activities.   3. Independent with home exercise program.   4. Full return to functional activities with manageable complaints.  5. Patient to demonstrate improved posture and body mechanics.  6. Decrease pain by 75% during functional activities.    PLAN     Recommended Treatment Plan: 1x times per week for 12 weeks with treatments to consist of:  Neuromuscular and postural re-education,  training, therapeutic exercise, therapeutic activities,balance training, gait training, manual therapy, soft tissue mobilization, ROM exercises, Cardiovascular,  Postural stabilization, manual traction, spinal mobilization, moist heat, cryotherapy,  electrical stimulation, ultrasound, home exercise education and planning.     Continue with established  PT Plan of Care towards patient goals.  Progress functional strengthening next visit.      Steve Joyce, PT  01/17/2023

## 2023-01-18 ENCOUNTER — TELEPHONE (OUTPATIENT)
Dept: PHARMACY | Facility: CLINIC | Age: 73
End: 2023-01-18
Payer: MEDICARE

## 2023-01-18 ENCOUNTER — PES CALL (OUTPATIENT)
Dept: ADMINISTRATIVE | Facility: CLINIC | Age: 73
End: 2023-01-18
Payer: MEDICARE

## 2023-01-18 NOTE — TELEPHONE ENCOUNTER
I have informed Driss Hernandez Jr. he has been approved in the [employer] Patient Assistance Program through 12/31/23. A 90 day supply of Farxiga will be shipped to [facility] in 7-10 business days. Patient has 3 refills patient is responsible for calling in her refills.  Updated proof of eligibility is required to re-enroll for 2024 calendar year.  I'm not sure who submitted his application but I never received his re-enrollment documents.

## 2023-01-24 ENCOUNTER — CLINICAL SUPPORT (OUTPATIENT)
Dept: REHABILITATION | Facility: HOSPITAL | Age: 73
End: 2023-01-24
Payer: MEDICARE

## 2023-01-24 DIAGNOSIS — R29.898 IMPAIRED STRENGTH OF SHOULDER MUSCLES: ICD-10-CM

## 2023-01-24 DIAGNOSIS — M25.611 DECREASED RANGE OF MOTION OF RIGHT SHOULDER: Primary | ICD-10-CM

## 2023-01-24 PROCEDURE — 97110 THERAPEUTIC EXERCISES: CPT | Mod: PN

## 2023-01-24 PROCEDURE — 97140 MANUAL THERAPY 1/> REGIONS: CPT | Mod: PN

## 2023-01-24 NOTE — PROGRESS NOTES
[facility]   Physical Therapy Progress Note     Name: Driss Hernandez Jr.  Clinic Number: [id number]    Therapy Diagnosis:   Encounter Diagnoses   Name Primary?    Decreased range of motion of right shoulder Yes    Impaired strength of shoulder muscles          Physician: Allison Hemphill PA-C    Visit Date: 1/24/2023       Physician Orders: PT Eval and Treat   Medical Diagnosis from Referral: M75.112 (ICD-10-CM) - Nontraumatic incomplete tear of left rotator cuff  Evaluation Date: 7/8/2022  Plan of Care Expiration: 3/31/2023    Authorization Period Expiration: 7/5/23  Visit # / Visits authorized: 11/ 20    Precautions: Standard, s/p Right partial thickness RTC repair with biceps tenodesis     DOS: 7-5-22       Time In: 0945  Time Out: 1040    Total Billable Time: 55 minutes      SUBJECTIVE     Pt reports: the shoulder is sore from overdoing things at home. States he returns to MD next week.     He was somewhat compliant with home exercise program.  Response to previous treatment: good  Functional change: improvements with ADL's.     Pain: 3/10  Location: right shoulder       OBJECTIVE      TREATMENT     Driss received the treatments listed below:      received therapeutic exercises to develop strength and ROM for 50 minutes including:     UBE x 6 min  Pulley x 4 min   ER/IR green tubing 3 x 10  Wall slides with pillow case x 2 x 10: np  supine wand ER x 15 5lbs  Supine wand press-up 3 x 10 5 lbs  Supine wand flexion x 10 5lbs  Bilateral shoulder ext/rows RTB 3 x 10  Sidleying ER 2lb 3 x 10  4 lbs ball on wall, wax on, wax off x 30  Wall clock YTB 2 x 10  Standing scaption 2lbs 2 x 10  Matrix rows 40 lbs 3 x 10  Body blade 30 sec x 2 @ 0 deg and scaption    received the following manual therapy techniques: Soft tissue Mobilization were applied to the: R shoulder for 5 minutes, including:  PROM GHJ in supine - all planes; gr III GHJ posterior/inferior mobs,    GHJ oscillation;     CP  post treatment x 10 right shoulder post treatment.     PATIENT EDUCATION AND HOME EXERCISES     Education provided:   HEP compliance     Written Home Exercises Provided: yes.     Exercises were reviewed and Driss was able to demonstrate them prior to the end of the session.  Driss demonstrated good  understanding of the education provided.      See EMR under Patient Instructions for exercises provided 7/8/2022.    ASSESSMENT      Pt demonstrates appropriate response to advanced RTC and scapular stabilization therex. Demonstrates improved passive GH ROM in all planes.   Continued good response to exercise progression without c/o pain. Driss Is progressing well  towards his goals.     Pt prognosis is Good.     Pt will continue to benefit from skilled outpatient physical therapy to address the deficits listed in the problem list box on initial evaluation, provide pt/family education and to maximize pt's level of independence in the home and community environment.     Pt's spiritual, cultural and educational needs considered and pt agreeable to plan of care and goals.     Anticipated barriers to physical therapy: none      Long Term Goals (16 Weeks):   In progress, partially MET. Updated 12/28/23     1. Increase ROM to allow improved joint biomechanics during functional activities.   2.Increase trunk and lower extremity strength to within normal limits during functional activities.   3. Independent with home exercise program.   4. Full return to functional activities with manageable complaints.  5. Patient to demonstrate improved posture and body mechanics.  6. Decrease pain by 75% during functional activities.    PLAN     Continue with established  PT Plan of Care towards patient goals.  Progress functional strengthening next visit.      Steve Joyce, PT  01/24/2023

## 2023-01-31 ENCOUNTER — CLINICAL SUPPORT (OUTPATIENT)
Dept: REHABILITATION | Facility: HOSPITAL | Age: 73
End: 2023-01-31
Payer: MEDICARE

## 2023-01-31 DIAGNOSIS — R29.898 IMPAIRED STRENGTH OF SHOULDER MUSCLES: ICD-10-CM

## 2023-01-31 DIAGNOSIS — M25.611 DECREASED RANGE OF MOTION OF RIGHT SHOULDER: Primary | ICD-10-CM

## 2023-01-31 PROCEDURE — 97140 MANUAL THERAPY 1/> REGIONS: CPT | Mod: PN

## 2023-01-31 PROCEDURE — 97110 THERAPEUTIC EXERCISES: CPT | Mod: PN

## 2023-01-31 NOTE — PROGRESS NOTES
[facility]   Physical Therapy Progress Note     Name: Driss Hernandez Jr.  Clinic Number: [id number]    Therapy Diagnosis:   Encounter Diagnoses   Name Primary?    Decreased range of motion of right shoulder Yes    Impaired strength of shoulder muscles          Physician: Allison Hemphill PA-C    Visit Date: 1/31/2023       Physician Orders: PT Eval and Treat   Medical Diagnosis from Referral: M75.112 (ICD-10-CM) - Nontraumatic incomplete tear of left rotator cuff  Evaluation Date: 7/8/2022  Plan of Care Expiration: 3/31/2023    Authorization Period Expiration: 7/5/23  Visit # / Visits authorized: 11/ 20    Precautions: Standard, s/p Right partial thickness RTC repair with biceps tenodesis     DOS: 7-5-22       Time In: 0945  Time Out: 1040    Total Billable Time: 55 minutes      SUBJECTIVE     Pt reports: shoulder has been painful at nights again. States he returns to [place] next week.     He was somewhat compliant with home exercise program.  Response to previous treatment: good  Functional change: improvements with ADL's.     Pain: 3/10  Location: right shoulder       OBJECTIVE            Shoulder Range of Motion:   ACTIVE ROM LEFT RIGHT   Flexion 150 138   Abduction 140 130   IR Functional T12 34   ER Functional T2 85      PASSIVE ROM LEFT RIGHT   Flexion    Abduction       STRENGTH LEFT RIGHT   Flexion 5/5 4   Abduction 4/5 4-   Extension NT NT   IR at 90 4/5 4   ER at 90 4/5 3+   Elbow Flexion 5/5 NT      TREATMENT     Driss received the treatments listed below:      received therapeutic exercises to develop strength and ROM for 50 minutes including:     UBE x 6 min  Pulley x 4 min   ER/IR green tubing 3 x 10  Wall slides with pillow case x 2 x 10: np  supine wand ER x 15 5lbs  Supine wand press-up 3 x 10 5 lbs  Supine wand flexion x 10 5lbs  Bilateral shoulder ext/rows RTB 3 x 10  Sidleying ER 2lb 3 x 10  4 lbs ball on wall, wax on, wax off x 30  Wall clock YTB 2 x  10  Standing scaption 2lbs 2 x 10  Matrix rows 40 lbs 3 x 10  Body blade 30 sec x 2 @ 0 deg and scaption    received the following manual therapy techniques: Soft tissue Mobilization were applied to the: R shoulder for 5 minutes, including:  PROM GHJ in supine - all planes; gr III GHJ posterior/inferior mobs,    GHJ oscillation;     CP post treatment x 10 right shoulder post treatment.     PATIENT EDUCATION AND HOME EXERCISES     Education provided:   HEP compliance     Written Home Exercises Provided: yes.     Exercises were reviewed and Driss was able to demonstrate them prior to the end of the session.  Driss demonstrated good  understanding of the education provided.      See EMR under Patient Instructions for exercises provided 7/8/2022.    ASSESSMENT      Pt demonstrates appropriate response to advanced RTC and scapular stabilization therex.  Active GH elevation limited to 136 deg with mild scapular compensation.   Continued good response to exercise progression without c/o pain. Driss Is progressing well  towards his goals.     Pt prognosis is Good.     Pt will continue to benefit from skilled outpatient physical therapy to address the deficits listed in the problem list box on initial evaluation, provide pt/family education and to maximize pt's level of independence in the home and community environment.     Pt's spiritual, cultural and educational needs considered and pt agreeable to plan of care and goals.     Anticipated barriers to physical therapy: none      Long Term Goals (16 Weeks):   In progress, partially MET. Updated 1/31/23       1. Increase ROM to allow improved joint biomechanics during functional activities. Partially MET  2.Increase trunk and upper extremity strength to within normal limits during functional activities. Partially MET  3. Independent with home exercise program. Inconsistently MET  4. Full return to functional activities with manageable complaints. Not MET  5. Patient to  demonstrate improved posture and body mechanics. MET  6. Decrease pain by 75% during functional activities. MET    PLAN     Continue with established  PT Plan of Care towards patient goals.  Will continue pending MD recommendations.     Steve Joyce, PT  01/31/2023                                                                                          [facility]   Physical Therapy Progress Note     Name: Driss Hernandez Jr.  Clinic Number: [id number]    Therapy Diagnosis:   Encounter Diagnoses   Name Primary?    Decreased range of motion of right shoulder Yes    Impaired strength of shoulder muscles          Physician: Allison Hemphill PA-C    Visit Date: 1/31/2023       Physician Orders: PT Eval and Treat   Medical Diagnosis from Referral: M75.112 (ICD-10-CM) - Nontraumatic incomplete tear of left rotator cuff  Evaluation Date: 7/8/2022  Plan of Care Expiration: 3/31/2023    Authorization Period Expiration: 7/5/23  Visit # / Visits authorized: 11/ 20    Precautions: Standard, s/p Right partial thickness RTC repair with biceps tenodesis     DOS: 7-5-22       Time In: 0945  Time Out: 1040    Total Billable Time: 55 minutes      SUBJECTIVE     Pt reports: the shoulder is sore from overdoing things at home. States he returns to [place] next week.     He was somewhat compliant with home exercise program.  Response to previous treatment: good  Functional change: improvements with ADL's.     Pain: 3/10  Location: right shoulder       OBJECTIVE      TREATMENT     Driss received the treatments listed below:      received therapeutic exercises to develop strength and ROM for 50 minutes including:     UBE x 6 min  Pulley x 4 min   ER/IR green tubing 3 x 10  Wall slides with pillow case x 2 x 10: np  supine wand ER x 15 5lbs  Supine wand press-up 3 x 10 5 lbs  Supine wand flexion x 10 5lbs  Bilateral shoulder ext/rows RTB 3 x 10  Sidleying ER 2lb 3 x 10  4 lbs ball on wall, wax on, wax off x  30  Wall clock YTB 2 x 10  Standing scaption 2lbs 2 x 10  Matrix rows 40 lbs 3 x 10  Body blade 30 sec x 2 @ 0 deg and scaption    received the following manual therapy techniques: Soft tissue Mobilization were applied to the: R shoulder for 5 minutes, including:  PROM GHJ in supine - all planes; gr III GHJ posterior/inferior mobs,    GHJ oscillation;     CP post treatment x 10 right shoulder post treatment.     PATIENT EDUCATION AND HOME EXERCISES     Education provided:   HEP compliance     Written Home Exercises Provided: yes.     Exercises were reviewed and Driss was able to demonstrate them prior to the end of the session.  Driss demonstrated good  understanding of the education provided.      See EMR under Patient Instructions for exercises provided 7/8/2022.    ASSESSMENT      Pt demonstrates appropriate response to advanced RTC and scapular stabilization therex. Demonstrates improved passive GH ROM in all planes.   Continued good response to exercise progression without c/o pain. Driss Is progressing well  towards his goals.     Pt prognosis is Good.     Pt will continue to benefit from skilled outpatient physical therapy to address the deficits listed in the problem list box on initial evaluation, provide pt/family education and to maximize pt's level of independence in the home and community environment.     Pt's spiritual, cultural and educational needs considered and pt agreeable to plan of care and goals.     Anticipated barriers to physical therapy: none      Long Term Goals (16 Weeks):   In progress, partially MET. Updated 12/28/23     1. Increase ROM to allow improved joint biomechanics during functional activities.   2.Increase trunk and lower extremity strength to within normal limits during functional activities.   3. Independent with home exercise program.   4. Full return to functional activities with manageable complaints.  5. Patient to demonstrate improved posture and body mechanics.  6.  Decrease pain by 75% during functional activities.    PLAN     Continue with established  PT Plan of Care towards patient goals.  Progress functional strengthening next visit.      Steve Joyce, PT  01/31/2023

## 2023-02-02 ENCOUNTER — LAB VISIT (OUTPATIENT)
Dept: LAB | Facility: HOSPITAL | Age: 73
End: 2023-02-02
Attending: NURSE PRACTITIONER
Payer: MEDICARE

## 2023-02-02 DIAGNOSIS — E11.65 TYPE 2 DIABETES MELLITUS WITH HYPERGLYCEMIA, WITH LONG-TERM CURRENT USE OF INSULIN: ICD-10-CM

## 2023-02-02 DIAGNOSIS — Z79.4 TYPE 2 DIABETES MELLITUS WITH HYPERGLYCEMIA, WITH LONG-TERM CURRENT USE OF INSULIN: ICD-10-CM

## 2023-02-02 PROCEDURE — 36415 COLL VENOUS BLD VENIPUNCTURE: CPT | Mod: PN | Performed by: NURSE PRACTITIONER

## 2023-02-02 PROCEDURE — 83036 HEMOGLOBIN GLYCOSYLATED A1C: CPT | Performed by: NURSE PRACTITIONER

## 2023-02-03 LAB
ESTIMATED AVG GLUCOSE: 160 MG/DL (ref 68–131)
HBA1C MFR BLD: 7.2 % (ref 4–5.6)

## 2023-02-07 ENCOUNTER — TELEPHONE (OUTPATIENT)
Dept: ADMINISTRATIVE | Facility: CLINIC | Age: 73
End: 2023-02-07
Payer: MEDICARE

## 2023-02-07 DIAGNOSIS — Z00.00 ENCOUNTER FOR MEDICARE ANNUAL WELLNESS EXAM: ICD-10-CM

## 2023-02-07 NOTE — TELEPHONE ENCOUNTER
Called pt, no answer; left message informing pt I was calling to remind pt of his in office EAWV on 2/9/23 and to return my call if he had any questions; left my name and number

## 2023-02-09 ENCOUNTER — OFFICE VISIT (OUTPATIENT)
Dept: FAMILY MEDICINE | Facility: CLINIC | Age: 73
End: 2023-02-09
Payer: MEDICARE

## 2023-02-09 ENCOUNTER — OFFICE VISIT (OUTPATIENT)
Dept: ORTHOPEDICS | Facility: CLINIC | Age: 73
End: 2023-02-09
Payer: MEDICARE

## 2023-02-09 VITALS
TEMPERATURE: 97 F | HEIGHT: 67 IN | WEIGHT: 184.31 LBS | DIASTOLIC BLOOD PRESSURE: 60 MMHG | BODY MASS INDEX: 28.93 KG/M2 | OXYGEN SATURATION: 99 % | SYSTOLIC BLOOD PRESSURE: 130 MMHG | HEART RATE: 60 BPM

## 2023-02-09 DIAGNOSIS — I25.700 CORONARY ARTERY DISEASE INVOLVING CORONARY BYPASS GRAFT OF NATIVE HEART WITH UNSTABLE ANGINA PECTORIS: ICD-10-CM

## 2023-02-09 DIAGNOSIS — K21.9 GASTROESOPHAGEAL REFLUX DISEASE, UNSPECIFIED WHETHER ESOPHAGITIS PRESENT: ICD-10-CM

## 2023-02-09 DIAGNOSIS — F13.20 SEDATIVE DEPENDENCE: ICD-10-CM

## 2023-02-09 DIAGNOSIS — M54.16 LUMBAR RADICULOPATHY, RIGHT: ICD-10-CM

## 2023-02-09 DIAGNOSIS — Z79.4 TYPE 2 DIABETES MELLITUS WITH HYPERGLYCEMIA, WITH LONG-TERM CURRENT USE OF INSULIN: ICD-10-CM

## 2023-02-09 DIAGNOSIS — Z00.00 ENCOUNTER FOR PREVENTIVE HEALTH EXAMINATION: Primary | ICD-10-CM

## 2023-02-09 DIAGNOSIS — M51.36 DDD (DEGENERATIVE DISC DISEASE), LUMBAR: ICD-10-CM

## 2023-02-09 DIAGNOSIS — N18.31 STAGE 3A CHRONIC KIDNEY DISEASE: ICD-10-CM

## 2023-02-09 DIAGNOSIS — E78.2 MIXED HYPERLIPIDEMIA: ICD-10-CM

## 2023-02-09 DIAGNOSIS — Z79.01 LONG TERM (CURRENT) USE OF ANTICOAGULANTS: ICD-10-CM

## 2023-02-09 DIAGNOSIS — E11.65 TYPE 2 DIABETES MELLITUS WITH HYPERGLYCEMIA, WITH LONG-TERM CURRENT USE OF INSULIN: ICD-10-CM

## 2023-02-09 DIAGNOSIS — M47.897 OTHER OSTEOARTHRITIS OF SPINE, LUMBOSACRAL REGION: ICD-10-CM

## 2023-02-09 DIAGNOSIS — Z95.1 HX OF CABG: ICD-10-CM

## 2023-02-09 DIAGNOSIS — I10 ESSENTIAL HYPERTENSION: ICD-10-CM

## 2023-02-09 DIAGNOSIS — I65.23 ATHEROSCLEROSIS OF BOTH CAROTID ARTERIES: ICD-10-CM

## 2023-02-09 DIAGNOSIS — I25.10 CORONARY ARTERY DISEASE INVOLVING NATIVE CORONARY ARTERY OF NATIVE HEART WITHOUT ANGINA PECTORIS: ICD-10-CM

## 2023-02-09 DIAGNOSIS — M75.111 NONTRAUMATIC INCOMPLETE TEAR OF RIGHT ROTATOR CUFF: Primary | ICD-10-CM

## 2023-02-09 DIAGNOSIS — I48.11 LONGSTANDING PERSISTENT ATRIAL FIBRILLATION: ICD-10-CM

## 2023-02-09 DIAGNOSIS — Z98.61 HISTORY OF PERCUTANEOUS CORONARY INTERVENTION: ICD-10-CM

## 2023-02-09 DIAGNOSIS — I25.709 ATHEROSCLEROSIS OF CORONARY ARTERY BYPASS GRAFT WITH ANGINA PECTORIS, UNSPECIFIED WHETHER NATIVE OR TRANSPLANTED HEART: ICD-10-CM

## 2023-02-09 DIAGNOSIS — M46.1 BILATERAL SACROILIITIS: ICD-10-CM

## 2023-02-09 DIAGNOSIS — I70.0 AORTIC ATHEROSCLEROSIS: ICD-10-CM

## 2023-02-09 DIAGNOSIS — M47.812 CERVICAL SPONDYLOSIS: ICD-10-CM

## 2023-02-09 DIAGNOSIS — M47.816 LUMBAR SPONDYLOSIS: ICD-10-CM

## 2023-02-09 DIAGNOSIS — I73.9 PAD (PERIPHERAL ARTERY DISEASE): ICD-10-CM

## 2023-02-09 DIAGNOSIS — M50.30 DDD (DEGENERATIVE DISC DISEASE), CERVICAL: ICD-10-CM

## 2023-02-09 DIAGNOSIS — Z79.4 LONG-TERM INSULIN USE: ICD-10-CM

## 2023-02-09 DIAGNOSIS — E11.42 DIABETIC POLYNEUROPATHY ASSOCIATED WITH TYPE 2 DIABETES MELLITUS: ICD-10-CM

## 2023-02-09 DIAGNOSIS — E11.9 DM TYPE 2 WITHOUT RETINOPATHY: ICD-10-CM

## 2023-02-09 DIAGNOSIS — M54.12 CERVICAL RADICULOPATHY: ICD-10-CM

## 2023-02-09 DIAGNOSIS — I50.32 CHRONIC HEART FAILURE WITH PRESERVED EJECTION FRACTION: ICD-10-CM

## 2023-02-09 DIAGNOSIS — Z00.00 ENCOUNTER FOR MEDICARE ANNUAL WELLNESS EXAM: ICD-10-CM

## 2023-02-09 DIAGNOSIS — M54.16 LUMBAR RADICULOPATHY: ICD-10-CM

## 2023-02-09 PROCEDURE — 1159F MED LIST DOCD IN RCRD: CPT | Mod: CPTII,S$GLB,, | Performed by: NURSE PRACTITIONER

## 2023-02-09 PROCEDURE — 1101F PT FALLS ASSESS-DOCD LE1/YR: CPT | Mod: CPTII,S$GLB,, | Performed by: NURSE PRACTITIONER

## 2023-02-09 PROCEDURE — 3078F PR MOST RECENT DIASTOLIC BLOOD PRESSURE < 80 MM HG: ICD-10-PCS | Mod: CPTII,S$GLB,, | Performed by: NURSE PRACTITIONER

## 2023-02-09 PROCEDURE — 3008F BODY MASS INDEX DOCD: CPT | Mod: CPTII,S$GLB,, | Performed by: NURSE PRACTITIONER

## 2023-02-09 PROCEDURE — 1160F RVW MEDS BY RX/DR IN RCRD: CPT | Mod: CPTII,S$GLB,, | Performed by: NURSE PRACTITIONER

## 2023-02-09 PROCEDURE — 1170F PR FUNCTIONAL STATUS ASSESSED: ICD-10-PCS | Mod: CPTII,S$GLB,, | Performed by: NURSE PRACTITIONER

## 2023-02-09 PROCEDURE — 1101F PT FALLS ASSESS-DOCD LE1/YR: CPT | Mod: CPTII,S$GLB,, | Performed by: ORTHOPAEDIC SURGERY

## 2023-02-09 PROCEDURE — 99999 PR PBB SHADOW E&M-EST. PATIENT-LVL V: CPT | Mod: PBBFAC,,, | Performed by: NURSE PRACTITIONER

## 2023-02-09 PROCEDURE — 3075F PR MOST RECENT SYSTOLIC BLOOD PRESS GE 130-139MM HG: ICD-10-PCS | Mod: CPTII,S$GLB,, | Performed by: NURSE PRACTITIONER

## 2023-02-09 PROCEDURE — 3051F HG A1C>EQUAL 7.0%<8.0%: CPT | Mod: CPTII,S$GLB,, | Performed by: NURSE PRACTITIONER

## 2023-02-09 PROCEDURE — 1101F PR PT FALLS ASSESS DOC 0-1 FALLS W/OUT INJ PAST YR: ICD-10-PCS | Mod: CPTII,S$GLB,, | Performed by: ORTHOPAEDIC SURGERY

## 2023-02-09 PROCEDURE — 1170F FXNL STATUS ASSESSED: CPT | Mod: CPTII,S$GLB,, | Performed by: NURSE PRACTITIONER

## 2023-02-09 PROCEDURE — 3288F PR FALLS RISK ASSESSMENT DOCUMENTED: ICD-10-PCS | Mod: CPTII,S$GLB,, | Performed by: ORTHOPAEDIC SURGERY

## 2023-02-09 PROCEDURE — 3051F PR MOST RECENT HEMOGLOBIN A1C LEVEL 7.0 - < 8.0%: ICD-10-PCS | Mod: CPTII,S$GLB,, | Performed by: NURSE PRACTITIONER

## 2023-02-09 PROCEDURE — 1160F PR REVIEW ALL MEDS BY PRESCRIBER/CLIN PHARMACIST DOCUMENTED: ICD-10-PCS | Mod: CPTII,S$GLB,, | Performed by: ORTHOPAEDIC SURGERY

## 2023-02-09 PROCEDURE — 3008F PR BODY MASS INDEX (BMI) DOCUMENTED: ICD-10-PCS | Mod: CPTII,S$GLB,, | Performed by: NURSE PRACTITIONER

## 2023-02-09 PROCEDURE — 3075F SYST BP GE 130 - 139MM HG: CPT | Mod: CPTII,S$GLB,, | Performed by: NURSE PRACTITIONER

## 2023-02-09 PROCEDURE — 1101F PR PT FALLS ASSESS DOC 0-1 FALLS W/OUT INJ PAST YR: ICD-10-PCS | Mod: CPTII,S$GLB,, | Performed by: NURSE PRACTITIONER

## 2023-02-09 PROCEDURE — 1160F PR REVIEW ALL MEDS BY PRESCRIBER/CLIN PHARMACIST DOCUMENTED: ICD-10-PCS | Mod: CPTII,S$GLB,, | Performed by: NURSE PRACTITIONER

## 2023-02-09 PROCEDURE — 1159F PR MEDICATION LIST DOCUMENTED IN MEDICAL RECORD: ICD-10-PCS | Mod: CPTII,S$GLB,, | Performed by: ORTHOPAEDIC SURGERY

## 2023-02-09 PROCEDURE — 3288F FALL RISK ASSESSMENT DOCD: CPT | Mod: CPTII,S$GLB,, | Performed by: ORTHOPAEDIC SURGERY

## 2023-02-09 PROCEDURE — 99213 PR OFFICE/OUTPT VISIT, EST, LEVL III, 20-29 MIN: ICD-10-PCS | Mod: 25,S$GLB,, | Performed by: ORTHOPAEDIC SURGERY

## 2023-02-09 PROCEDURE — 3051F HG A1C>EQUAL 7.0%<8.0%: CPT | Mod: CPTII,S$GLB,, | Performed by: ORTHOPAEDIC SURGERY

## 2023-02-09 PROCEDURE — 3078F DIAST BP <80 MM HG: CPT | Mod: CPTII,S$GLB,, | Performed by: NURSE PRACTITIONER

## 2023-02-09 PROCEDURE — 1159F MED LIST DOCD IN RCRD: CPT | Mod: CPTII,S$GLB,, | Performed by: ORTHOPAEDIC SURGERY

## 2023-02-09 PROCEDURE — 1125F PR PAIN SEVERITY QUANTIFIED, PAIN PRESENT: ICD-10-PCS | Mod: CPTII,S$GLB,, | Performed by: ORTHOPAEDIC SURGERY

## 2023-02-09 PROCEDURE — 1125F AMNT PAIN NOTED PAIN PRSNT: CPT | Mod: CPTII,S$GLB,, | Performed by: ORTHOPAEDIC SURGERY

## 2023-02-09 PROCEDURE — 3051F PR MOST RECENT HEMOGLOBIN A1C LEVEL 7.0 - < 8.0%: ICD-10-PCS | Mod: CPTII,S$GLB,, | Performed by: ORTHOPAEDIC SURGERY

## 2023-02-09 PROCEDURE — 99999 PR PBB SHADOW E&M-EST. PATIENT-LVL IV: CPT | Mod: PBBFAC,,, | Performed by: ORTHOPAEDIC SURGERY

## 2023-02-09 PROCEDURE — 99213 OFFICE O/P EST LOW 20 MIN: CPT | Mod: 25,S$GLB,, | Performed by: ORTHOPAEDIC SURGERY

## 2023-02-09 PROCEDURE — 99999 PR PBB SHADOW E&M-EST. PATIENT-LVL IV: ICD-10-PCS | Mod: PBBFAC,,, | Performed by: ORTHOPAEDIC SURGERY

## 2023-02-09 PROCEDURE — G0439 PPPS, SUBSEQ VISIT: HCPCS | Mod: S$GLB,,, | Performed by: NURSE PRACTITIONER

## 2023-02-09 PROCEDURE — 1159F PR MEDICATION LIST DOCUMENTED IN MEDICAL RECORD: ICD-10-PCS | Mod: CPTII,S$GLB,, | Performed by: NURSE PRACTITIONER

## 2023-02-09 PROCEDURE — 3288F FALL RISK ASSESSMENT DOCD: CPT | Mod: CPTII,S$GLB,, | Performed by: NURSE PRACTITIONER

## 2023-02-09 PROCEDURE — 20610 LARGE JOINT ASPIRATION/INJECTION: R SUBACROMIAL BURSA: ICD-10-PCS | Mod: RT,S$GLB,, | Performed by: ORTHOPAEDIC SURGERY

## 2023-02-09 PROCEDURE — 20610 DRAIN/INJ JOINT/BURSA W/O US: CPT | Mod: RT,S$GLB,, | Performed by: ORTHOPAEDIC SURGERY

## 2023-02-09 PROCEDURE — 1160F RVW MEDS BY RX/DR IN RCRD: CPT | Mod: CPTII,S$GLB,, | Performed by: ORTHOPAEDIC SURGERY

## 2023-02-09 PROCEDURE — 3288F PR FALLS RISK ASSESSMENT DOCUMENTED: ICD-10-PCS | Mod: CPTII,S$GLB,, | Performed by: NURSE PRACTITIONER

## 2023-02-09 PROCEDURE — 99999 PR PBB SHADOW E&M-EST. PATIENT-LVL V: ICD-10-PCS | Mod: PBBFAC,,, | Performed by: NURSE PRACTITIONER

## 2023-02-09 PROCEDURE — G0439 PR MEDICARE ANNUAL WELLNESS SUBSEQUENT VISIT: ICD-10-PCS | Mod: S$GLB,,, | Performed by: NURSE PRACTITIONER

## 2023-02-09 RX ORDER — TRIAMCINOLONE ACETONIDE 40 MG/ML
40 INJECTION, SUSPENSION INTRA-ARTICULAR; INTRAMUSCULAR
Status: SHIPPED | OUTPATIENT
Start: 2023-02-09

## 2023-02-09 RX ADMIN — TRIAMCINOLONE ACETONIDE 40 MG: 40 INJECTION, SUSPENSION INTRA-ARTICULAR; INTRAMUSCULAR at 10:02

## 2023-02-09 NOTE — PROCEDURES
Large Joint Aspiration/Injection: R subacromial bursa    Date/Time: 2/9/2023 10:30 AM  Performed by: Valerie Olivera MD  Authorized by: Valerie Olivera MD     Consent Done?:  Yes (Verbal)  Indications:  Pain  Timeout: prior to procedure the correct patient, procedure, and site was verified    Prep: patient was prepped and draped in usual sterile fashion      Local anesthesia used?: Yes    Local anesthetic:  Topical anesthetic    Details:  Needle Size:  22 G  Approach:  Posterior  Location:  Shoulder  Site:  R subacromial bursa  Medications:  40 mg triamcinolone acetonide 40 mg/mL  Patient tolerance:  Patient tolerated the procedure well with no immediate complications

## 2023-02-09 NOTE — PROGRESS NOTES
Postoperative Visit    History of Present Illness:   Driss is 8 months s/p right shoulder ATS, regeneten, arthroscopic biceps tenodesis (DOS-7/5/22)  Stopped PT last week  Still having pain at night  Pain does not stop him from doing anything   Injection was helpful - pain relief for 3 months   Motion is better     Physical Examination:    Pain today is 3/10  Worst is 6/10  Best is 0/10     NAD  right upper extremity:  Incisions over the shoulder well healed  Active FE to 140  Supine can get to 150 with passive motion  ER in abduction to 30, IR to 10   LTSI m/u/r  2+ RP  + EPL, IO, FDS, FDP     Assessment/Plan:  72 y.o. male 8 months s/p right shoulder arthroscopy , rotator cuff repair and arthroscopic biceps tenodesis  (DOS-7/5/22)    Plan  Continue HEP  Continue tylenol PRN  Activity as tolerated  Injection of the right subacromial space  performed, please see procedure note for more details.   RTC at 1 year    All questions were answered in detail. The patient is in full agreement with the treatment plan and will proceed accordingly.

## 2023-02-09 NOTE — PROGRESS NOTES
"  Driss Hernandez presented for a  Medicare AWV and comprehensive Health Risk Assessment today. The following components were reviewed and updated:    Medical history  Family History  Social history  Allergies and Current Medications  Health Risk Assessment  Health Maintenance  Care Team         ** See Completed Assessments for Annual Wellness Visit within the encounter summary.**         The following assessments were completed:  Living Situation  CAGE  Depression Screening  Timed Get Up and Go  Whisper Test  Cognitive Function Screening  Nutrition Screening  ADL Screening  PAQ Screening        Vitals:    02/09/23 0826 02/09/23 0840   BP:  130/60   Pulse:  60   Temp:  97.3 °F (36.3 °C)   SpO2:  99%   Weight: 83.6 kg (184 lb 4.9 oz)    Height: 5' 7" (1.702 m)      Body mass index is 28.87 kg/m².  Physical Exam  Vitals reviewed.   Constitutional:       Appearance: Normal appearance.   Pulmonary:      Effort: Pulmonary effort is normal.   Skin:     General: Skin is warm and dry.   Neurological:      General: No focal deficit present.      Mental Status: He is alert and oriented to person, place, and time.   Psychiatric:         Mood and Affect: Mood normal.         Behavior: Behavior normal.               Diagnoses and health risks identified today and associated recommendations/orders:    1. Encounter for preventive health examination  Patient was seen today for AWV.  Healthcare maintenance and screening recommendations were discussed and updated as indicated.  Return in one year for AWV.    2. Chronic heart failure with preserved ejection fraction  The current medical regimen is effective;  continue present plan and medications.    3. 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    4. Diabetic polyneuropathy associated with type 2 diabetes mellitus  The current " medical regimen is effective;  continue present plan and medications.    5. Bilateral sacroiliitis  The current medical regimen is effective;  continue present plan and medications.    6. Sedative dependence  The current medical regimen is effective;  continue present plan and medications.    7. Longstanding persistent atrial fibrillation  The current medical regimen is effective;  continue present plan and medications.    8. Aortic atherosclerosis  Stable. Monitor.    9. Stage 3a chronic kidney disease  The current medical regimen is effective;  continue present plan and medications.    10. Lumbar spondylosis  The current medical regimen is effective;  continue present plan and medications.    11. DDD (degenerative disc disease), lumbar  The current medical regimen is effective;  continue present plan and medications.    12. Lumbar radiculopathy, right  The current medical regimen is effective;  continue present plan and medications.    13. Lumbar radiculopathy  The current medical regimen is effective;  continue present plan and medications.    14. Other osteoarthritis of spine, lumbosacral region  The current medical regimen is effective;  continue present plan and medications.    15. Cervical radiculopathy  The current medical regimen is effective;  continue present plan and medications.    16. Cervical spondylosis  The current medical regimen is effective;  continue present plan and medications.    17. DDD (degenerative disc disease), cervical  The current medical regimen is effective;  continue present plan and medications.    18. Mixed hyperlipidemia  The current medical regimen is effective;  continue present plan and medications.    19. Essential hypertension  The current medical regimen is effective;  continue present plan and medications.    20. Coronary artery disease involving native coronary artery of native heart without angina pectoris  The current medical regimen is effective;  continue present plan  and medications.    21. Hx of CABG  The current medical regimen is effective;  continue present plan and medications.    22. Coronary artery disease involving coronary bypass graft of native heart with unstable angina pectoris  The current medical regimen is effective;  continue present plan and medications.    23. Atherosclerosis of both carotid arteries  The current medical regimen is effective;  continue present plan and medications.    24. PAD (peripheral artery disease)  The current medical regimen is effective;  continue present plan and medications.    25. History of percutaneous coronary intervention  The current medical regimen is effective;  continue present plan and medications.    26. Long term (current) use of anticoagulants  The current medical regimen is effective;  continue present plan and medications.    27. DM type 2 without retinopathy  The current medical regimen is effective;  continue present plan and medications.    28. Type 2 diabetes mellitus with hyperglycemia, with long-term current use of insulin  The current medical regimen is effective;  continue present plan and medications.    29. Long-term insulin use  The current medical regimen is effective;  continue present plan and medications.    30. Gastroesophageal reflux disease, unspecified whether esophagitis present  The current medical regimen is effective;  continue present plan and medications.      Provided Driss with a 5-10 year written screening schedule and personal prevention plan. Recommendations were developed using the USPSTF age appropriate recommendations. Education, counseling, and referrals were provided as needed. After Visit Summary printed and given to patient which includes a list of additional screenings\tests needed.    Joyce Merchant NP    I offered to discuss advanced care planning, including how to pick a person who would make decisions for you if you were unable to make them for yourself, called a health care  power of , and what kind of decisions you might make such as use of life sustaining treatments such as ventilators and tube feeding when faced with a life limiting illness recorded on a living will that they will need to know. (How you want to be cared for as you near the end of your natural life)     X Patient is interested in learning more about how to make advanced directives.  I provided them paperwork and offered to discuss this with them.

## 2023-02-09 NOTE — PATIENT INSTRUCTIONS
Counseling and Referral of Other Preventative  (Italic type indicates deductible and co-insurance are waived)    Patient Name: Driss Hernandez  Today's Date: 2/9/2023    Health Maintenance       Date Due Completion Date    TETANUS VACCINE Never done ---    Shingles Vaccine (1 of 2) Never done ---    COVID-19 Vaccine (4 - Booster for Pfizer series) 02/01/2022 12/7/2021    Eye Exam 11/11/2022 11/11/2021    Override on 1/10/2017: Done (External eye doctor)    Diabetes Urine Screening 02/10/2023 2/10/2022    Foot Exam 02/18/2023 2/18/2022    Override on 3/11/2020: Done    Override on 3/13/2018: Done (patient deferred, reports it was done by Dr. Agrawal (endocrinologist))    Override on 3/6/2017: Done (completed by Endocrinology (Dr. Agrawal's office))    Hemoglobin A1c 08/02/2023 2/2/2023    Colorectal Cancer Screening 02/21/2024 2/21/2017        No orders of the defined types were placed in this encounter.      The following information is provided to all patients.  This information is to help you find resources for any of the problems found today that may be affecting your health:                Living healthy guide: www.[facility].[place].gov      Understanding Diabetes: www.diabetes.org      Eating healthy: www.cdc.gov/healthyweight      CDC home safety checklist: www.cdc.gov/steadi/patient.html      Agency on Aging: www.goea.[place].gov      Alcoholics anonymous (AA): www.aa.org      Physical Activity: www.nam.nih.gov/rh5cbju      Tobacco use: www.quitwithusla.org

## 2023-02-27 ENCOUNTER — TELEPHONE (OUTPATIENT)
Dept: ENDOCRINOLOGY | Facility: CLINIC | Age: 73
End: 2023-02-27

## 2023-02-27 ENCOUNTER — OFFICE VISIT (OUTPATIENT)
Dept: ENDOCRINOLOGY | Facility: CLINIC | Age: 73
End: 2023-02-27
Payer: MEDICARE

## 2023-02-27 VITALS
BODY MASS INDEX: 28.13 KG/M2 | HEART RATE: 60 BPM | OXYGEN SATURATION: 95 % | SYSTOLIC BLOOD PRESSURE: 140 MMHG | DIASTOLIC BLOOD PRESSURE: 70 MMHG | TEMPERATURE: 98 F | WEIGHT: 179.63 LBS

## 2023-02-27 DIAGNOSIS — E11.65 TYPE 2 DIABETES MELLITUS WITH HYPERGLYCEMIA, WITH LONG-TERM CURRENT USE OF INSULIN: Primary | ICD-10-CM

## 2023-02-27 DIAGNOSIS — N18.31 STAGE 3A CHRONIC KIDNEY DISEASE: ICD-10-CM

## 2023-02-27 DIAGNOSIS — I25.10 CORONARY ARTERY DISEASE INVOLVING NATIVE CORONARY ARTERY OF NATIVE HEART WITHOUT ANGINA PECTORIS: ICD-10-CM

## 2023-02-27 DIAGNOSIS — Z79.4 TYPE 2 DIABETES MELLITUS WITH HYPERGLYCEMIA, WITH LONG-TERM CURRENT USE OF INSULIN: Primary | ICD-10-CM

## 2023-02-27 PROCEDURE — 1101F PR PT FALLS ASSESS DOC 0-1 FALLS W/OUT INJ PAST YR: ICD-10-PCS | Mod: CPTII,S$GLB,, | Performed by: NURSE PRACTITIONER

## 2023-02-27 PROCEDURE — 1160F PR REVIEW ALL MEDS BY PRESCRIBER/CLIN PHARMACIST DOCUMENTED: ICD-10-PCS | Mod: CPTII,S$GLB,, | Performed by: NURSE PRACTITIONER

## 2023-02-27 PROCEDURE — 99999 PR PBB SHADOW E&M-EST. PATIENT-LVL V: ICD-10-PCS | Mod: PBBFAC,,, | Performed by: NURSE PRACTITIONER

## 2023-02-27 PROCEDURE — 3077F SYST BP >= 140 MM HG: CPT | Mod: CPTII,S$GLB,, | Performed by: NURSE PRACTITIONER

## 2023-02-27 PROCEDURE — 3008F BODY MASS INDEX DOCD: CPT | Mod: CPTII,S$GLB,, | Performed by: NURSE PRACTITIONER

## 2023-02-27 PROCEDURE — 3008F PR BODY MASS INDEX (BMI) DOCUMENTED: ICD-10-PCS | Mod: CPTII,S$GLB,, | Performed by: NURSE PRACTITIONER

## 2023-02-27 PROCEDURE — 1160F RVW MEDS BY RX/DR IN RCRD: CPT | Mod: CPTII,S$GLB,, | Performed by: NURSE PRACTITIONER

## 2023-02-27 PROCEDURE — 3288F FALL RISK ASSESSMENT DOCD: CPT | Mod: CPTII,S$GLB,, | Performed by: NURSE PRACTITIONER

## 2023-02-27 PROCEDURE — 1159F MED LIST DOCD IN RCRD: CPT | Mod: CPTII,S$GLB,, | Performed by: NURSE PRACTITIONER

## 2023-02-27 PROCEDURE — 1159F PR MEDICATION LIST DOCUMENTED IN MEDICAL RECORD: ICD-10-PCS | Mod: CPTII,S$GLB,, | Performed by: NURSE PRACTITIONER

## 2023-02-27 PROCEDURE — 1126F PR PAIN SEVERITY QUANTIFIED, NO PAIN PRESENT: ICD-10-PCS | Mod: CPTII,S$GLB,, | Performed by: NURSE PRACTITIONER

## 2023-02-27 PROCEDURE — 3078F PR MOST RECENT DIASTOLIC BLOOD PRESSURE < 80 MM HG: ICD-10-PCS | Mod: CPTII,S$GLB,, | Performed by: NURSE PRACTITIONER

## 2023-02-27 PROCEDURE — 1101F PT FALLS ASSESS-DOCD LE1/YR: CPT | Mod: CPTII,S$GLB,, | Performed by: NURSE PRACTITIONER

## 2023-02-27 PROCEDURE — 3051F HG A1C>EQUAL 7.0%<8.0%: CPT | Mod: CPTII,S$GLB,, | Performed by: NURSE PRACTITIONER

## 2023-02-27 PROCEDURE — 99999 PR PBB SHADOW E&M-EST. PATIENT-LVL V: CPT | Mod: PBBFAC,,, | Performed by: NURSE PRACTITIONER

## 2023-02-27 PROCEDURE — 3078F DIAST BP <80 MM HG: CPT | Mod: CPTII,S$GLB,, | Performed by: NURSE PRACTITIONER

## 2023-02-27 PROCEDURE — 99214 PR OFFICE/OUTPT VISIT, EST, LEVL IV, 30-39 MIN: ICD-10-PCS | Mod: S$GLB,,, | Performed by: NURSE PRACTITIONER

## 2023-02-27 PROCEDURE — 1126F AMNT PAIN NOTED NONE PRSNT: CPT | Mod: CPTII,S$GLB,, | Performed by: NURSE PRACTITIONER

## 2023-02-27 PROCEDURE — 99214 OFFICE O/P EST MOD 30 MIN: CPT | Mod: S$GLB,,, | Performed by: NURSE PRACTITIONER

## 2023-02-27 PROCEDURE — 3051F PR MOST RECENT HEMOGLOBIN A1C LEVEL 7.0 - < 8.0%: ICD-10-PCS | Mod: CPTII,S$GLB,, | Performed by: NURSE PRACTITIONER

## 2023-02-27 PROCEDURE — 3288F PR FALLS RISK ASSESSMENT DOCUMENTED: ICD-10-PCS | Mod: CPTII,S$GLB,, | Performed by: NURSE PRACTITIONER

## 2023-02-27 PROCEDURE — 3077F PR MOST RECENT SYSTOLIC BLOOD PRESSURE >= 140 MM HG: ICD-10-PCS | Mod: CPTII,S$GLB,, | Performed by: NURSE PRACTITIONER

## 2023-02-27 RX ORDER — DAPAGLIFLOZIN 5 MG/1
5 TABLET, FILM COATED ORAL DAILY
Qty: 30 TABLET | Refills: 3 | Status: SHIPPED | OUTPATIENT
Start: 2023-02-27

## 2023-02-27 RX ORDER — METFORMIN HYDROCHLORIDE 500 MG/1
TABLET, EXTENDED RELEASE ORAL
Qty: 270 TABLET | Refills: 2 | Status: SHIPPED | OUTPATIENT
Start: 2023-02-27 | End: 2023-09-26 | Stop reason: SDUPTHER

## 2023-02-27 RX ORDER — GLIMEPIRIDE 2 MG/1
TABLET ORAL
Qty: 90 TABLET | Refills: 2 | Status: SHIPPED | OUTPATIENT
Start: 2023-02-27 | End: 2023-09-26

## 2023-02-27 NOTE — TELEPHONE ENCOUNTER
A Patient Assistance Application for Driss Hernandez JrCherise - MRN  [id number] was faxed to your office @[phone].  Please have MERLIN Guajardo review the application to ensure the prescription is correct. If correct, sign and fax the application back to the Pharmacy Patient Assistance Team @[phone].       If changes need to be made to this application, please let me know so corrections can be made, and the application will be faxed back to you for approval and signature.

## 2023-02-27 NOTE — PROGRESS NOTES
CC: This 72 y.o. White male  is here for evaluation of  T2DM along with comorbidities indicated in the Visit Diagnosis section of this encounter.    HPI: Driss Hernandez Jr. was diagnosed with T2DM in ~ 2005.   Pt was under care of Dr. Agrawal and TARI Werner NP, previously.   Medical history: CAD s/p CABG, atrial fibrillation, CHF       Prior visit 11/2022  A1c is up from 6.3 to 7.4%. Pt admits to eating more carbs lately like ice cream at night. He's also received a few steroid injections in the last month.   Plan Continue all medications.   Improve diet.   Remember to re-apply for the following in the next year: Farxiga, Tresiba, and Ozempic.   Will consider increasing Ozempic to 2 mg next year to help cut appetite.   Test glucose 2-3x/day.   Return to clinic in 3 months with labs prior.     Interval hx  A1c is a bit down from 7.4 to now 7.2%.   C/o urgent BM after meals so it's inconvenient when he dines out.         LAST DIABETES EDUCATION: never --     HOSPITALIZED FOR DIABETES  -  No.    DIABETES MEDICATION HX:   ozempic started 10/2020  Novolin R switched to glimepiride in Jan 2021     PRESCRIBED DIABETES MEDICATIONS:   Ozempic 1 mg once weekly  (obtains from [facility]acy [facility])  Tresiba  20 units hs (obtains from [facility])  metformin ER 1000 mg bid  glimepiride 2 mg once daily around 3 pm before snack   Farxiga 5 mg daily       Misses medication doses - No      DM COMPLICATIONS: peripheral neuropathy and cardiovascular disease    SELF MONITORING BLOOD GLUCOSE: Checks blood glucose at home 2-3x/day.               HYPOGLYCEMIC EPISODES: symptoms start < 70, infrequent. Very infrequent.     Lowest BG was 62 in the morning. He had woken up sweaty the night before and didn't test BG or consume glucose. Hypoglycemia secondary to low food intake the day before.      CURRENT DIET: drinks water, 2% milk ~ 2 cup/day. Eats mostly just lunch and dinner. Lunch can be as late as 2 pm. Occasionally eats a  protein bar in the AM.     drinks 2 cups coffee with milk with AS in AM   Eats small portions.       CURRENT EXERCISE:  None     SOCIAL: his daughter is a palliative care NP      BP (!) 140/70 (BP Location: Right arm, Patient Position: Sitting, BP Method: Medium (Manual))   Pulse 60   Temp 98.1 °F (36.7 °C) (Oral)   Wt 81.5 kg (179 lb 9.6 oz)   SpO2 95%   BMI 28.13 kg/m²       ROS:   CONSTITUTIONAL: Appetite good, denies fatigue  MS: + impaired mobility to right shoulder s/p sx.       PHYSICAL EXAM:  GENERAL: Well developed, well nourished. No acute distress.   PSYCH: AAOx3, appropriate mood and affect, conversant, well-groomed. Judgement and insight good.   NEURO: Cranial nerves grossly intact. Speech clear, no tremor.   CHEST: Respirations even and unlabored.   Other: pt declines foot exam today.       Hemoglobin A1C   Date Value Ref Range Status   02/02/2023 7.2 (H) 4.0 - 5.6 % Final     Comment:     ADA Screening Guidelines:  5.7-6.4%  Consistent with prediabetes  >or=6.5%  Consistent with diabetes    High levels of fetal hemoglobin interfere with the HbA1C  assay. Heterozygous hemoglobin variants (HbS, HgC, etc)do  not significantly interfere with this assay.   However, presence of multiple variants may affect accuracy.     11/11/2022 7.4 (H) 4.0 - 5.6 % Final     Comment:     ADA Screening Guidelines:  5.7-6.4%  Consistent with prediabetes  >or=6.5%  Consistent with diabetes    High levels of fetal hemoglobin interfere with the HbA1C  assay. Heterozygous hemoglobin variants (HbS, HgC, etc)do  not significantly interfere with this assay.   However, presence of multiple variants may affect accuracy.     08/11/2022 6.3 (H) 4.0 - 5.6 % Final     Comment:     ADA Screening Guidelines:  5.7-6.4%  Consistent with prediabetes  >or=6.5%  Consistent with diabetes    High levels of fetal hemoglobin interfere with the HbA1C  assay. Heterozygous hemoglobin variants (HbS, HgC, etc)do  not significantly interfere with  this assay.   However, presence of multiple variants may affect accuracy.     08/29/2019 7.7 % Final     Comment:     Ania Werner           Chemistry        Component Value Date/Time     11/11/2022 0729    K 4.9 11/11/2022 0729     11/11/2022 0729    CO2 24 11/11/2022 0729    BUN 25 (H) 11/11/2022 0729    CREATININE 1.4 11/11/2022 0729     (H) 11/11/2022 0729        Component Value Date/Time    CALCIUM 9.5 11/11/2022 0729    ALKPHOS 28 (L) 09/13/2022 0658    AST 18 09/13/2022 0658    ALT 12 09/13/2022 0658    BILITOT 0.5 09/13/2022 0658    ESTGFRAFRICA 53.0 (A) 06/09/2022 0826    EGFRNONAA 45.9 (A) 06/09/2022 0826         Latest Reference Range & Units 11/11/22 07:29   BUN 8 - 23 mg/dL 25 (H)   Creatinine 0.5 - 1.4 mg/dL 1.4   eGFR >60 mL/min/1.73 m^2 53 !   (H): Data is abnormally high  !: Data is abnormal      Lab Results   Component Value Date    LDLCALC 79.2 09/13/2022        Latest Reference Range & Units 09/13/22 06:58   Cholesterol 120 - 199 mg/dL 133   HDL 40 - 75 mg/dL 25 (L)   HDL/Cholesterol Ratio 20.0 - 50.0 % 18.8 (L)   LDL Cholesterol External 63.0 - 159.0 mg/dL 79.2   Non-HDL Cholesterol mg/dL 108   Total Cholesterol/HDL Ratio 2.0 - 5.0  5.3 (H)   Triglycerides 30 - 150 mg/dL 144   (L): Data is abnormally low  (H): Data is abnormally high  Lab Results   Component Value Date    MICALBCREAT 141.2 (H) 02/10/2022             ASSESSMENT and PLAN:    A1C GOAL: < 7.5 %     1. Type 2 diabetes mellitus with hyperglycemia, with long-term current use of insulin  Increase Ozempic to 2 mg once weekly. Reviewed potential side effects. If unable to tolerate 2 mg due to side effects, then ok to reduce back to 1 mg weekly which would equal 36 clicks on the 2 mg pen.     Reduce metformin from 4 tablets a day to 3 tablets a day, to reduce urgency of BMs. Take metformin 1 tablet with breakfast and 2 tablets with supper.   Continue Farxiga, glimepiride, Tresiba 20 units.     If blood sugars drop  less than 80, then contact office. May need to reduce Tresiba and/or glimepiride.     Test glucose 2x/day. Keep log.     Referral placed for Pharmacy Assistance for help applying for Farxiga, Tresiba, and Ozempic 2 mg.     Return to clinic in 3 months with labs prior.     Ambulatory referral/consult to Pharmacy Assistance    semaglutide (OZEMPIC) 2 mg/dose (8 mg/3 mL) PnIj    dapagliflozin (FARXIGA) 5 mg Tab tablet    Microalbumin/Creatinine Ratio, Urine    Hemoglobin A1C    Creatinine, Serum      2. Coronary artery disease involving native coronary artery of native heart without angina pectoris  semaglutide (OZEMPIC) 2 mg/dose (8 mg/3 mL) PnIj      3. Stage 3a chronic kidney disease  dapagliflozin (FARXIGA) 5 mg Tab tablet    Microalbumin/Creatinine Ratio, Urine        Orders Placed This Encounter   Procedures    Microalbumin/Creatinine Ratio, Urine     Standing Status:   Future     Standing Expiration Date:   4/27/2024     Order Specific Question:   Specimen Source     Answer:   Urine    Hemoglobin A1C     Standing Status:   Future     Standing Expiration Date:   4/27/2024    Creatinine, Serum     Standing Status:   Future     Standing Expiration Date:   4/27/2024    Ambulatory referral/consult to Pharmacy Assistance     Standing Status:   Future     Standing Expiration Date:   3/27/2024     Referral Priority:   Routine     Referral Type:   Consultation     Referral Reason:   Specialty Services Required     Number of Visits Requested:   1          Follow up in about 3 months (around 5/27/2023).

## 2023-02-27 NOTE — PATIENT INSTRUCTIONS
Increase Ozempic to 2 mg once weekly. Reviewed potential side effects. If unable to tolerate 2 mg due to side effects, then ok to reduce back to 1 mg weekly which would equal 36 clicks on the 2 mg pen.     Reduce metformin from 4 tablets a day to 3 tablets a day, to reduce urgency of BMs. Take metformin 1 tablet with breakfast and 2 tablets with supper.   Continue Farxiga, glimepiride, Tresiba 20 units.     If blood sugars drop less than 80, then contact office. May need to reduce Tresiba and/or glimepiride.     Test glucose 2x/day. Keep log.     Referral placed for Pharmacy Assistance for help applying for Farxiga, Tresiba, and Ozempic 2 mg.     Return to clinic in 3 months with labs prior.

## 2023-02-27 NOTE — TELEPHONE ENCOUNTER
----- Message from Janie Guajardo NP sent at 2/27/2023 12:41 PM CST -----  Pt should call pharmacy and ask for specific name of prior needles so that the correct prescription can be sent.   ----- Message -----  From: Mary Mosquera MA  Sent: 2/27/2023  10:40 AM CST  To: Janie Guajardo NP     said he forgot to mention to you that he is having trouble with his meter and needles. The needles that are currently being prescribed doesn't work as well and hurt. Is it possible to get the old needles again that he was previously using?

## 2023-03-08 ENCOUNTER — OFFICE VISIT (OUTPATIENT)
Dept: CARDIOLOGY | Facility: CLINIC | Age: 73
End: 2023-03-08
Payer: MEDICARE

## 2023-03-08 VITALS
OXYGEN SATURATION: 99 % | BODY MASS INDEX: 27.68 KG/M2 | HEIGHT: 67 IN | WEIGHT: 176.38 LBS | HEART RATE: 61 BPM | RESPIRATION RATE: 18 BRPM | SYSTOLIC BLOOD PRESSURE: 138 MMHG | DIASTOLIC BLOOD PRESSURE: 78 MMHG

## 2023-03-08 DIAGNOSIS — I73.9 PAD (PERIPHERAL ARTERY DISEASE): ICD-10-CM

## 2023-03-08 DIAGNOSIS — R06.09 DOE (DYSPNEA ON EXERTION): ICD-10-CM

## 2023-03-08 DIAGNOSIS — N18.31 STAGE 3A CHRONIC KIDNEY DISEASE: ICD-10-CM

## 2023-03-08 DIAGNOSIS — I10 ESSENTIAL HYPERTENSION: ICD-10-CM

## 2023-03-08 DIAGNOSIS — Z79.01 LONG TERM (CURRENT) USE OF ANTICOAGULANTS: ICD-10-CM

## 2023-03-08 DIAGNOSIS — R94.31 ABNORMAL EKG: ICD-10-CM

## 2023-03-08 DIAGNOSIS — R53.83 FATIGUE, UNSPECIFIED TYPE: ICD-10-CM

## 2023-03-08 DIAGNOSIS — E78.2 MIXED HYPERLIPIDEMIA: ICD-10-CM

## 2023-03-08 DIAGNOSIS — I48.11 LONGSTANDING PERSISTENT ATRIAL FIBRILLATION: ICD-10-CM

## 2023-03-08 DIAGNOSIS — N18.31 DIABETES MELLITUS DUE TO UNDERLYING CONDITION WITH STAGE 3A CHRONIC KIDNEY DISEASE, WITH LONG-TERM CURRENT USE OF INSULIN: ICD-10-CM

## 2023-03-08 DIAGNOSIS — I65.23 ATHEROSCLEROSIS OF BOTH CAROTID ARTERIES: ICD-10-CM

## 2023-03-08 DIAGNOSIS — I25.810 CORONARY ARTERY DISEASE INVOLVING CORONARY BYPASS GRAFT OF NATIVE HEART WITHOUT ANGINA PECTORIS: Primary | ICD-10-CM

## 2023-03-08 DIAGNOSIS — Z79.4 DIABETES MELLITUS DUE TO UNDERLYING CONDITION WITH STAGE 3A CHRONIC KIDNEY DISEASE, WITH LONG-TERM CURRENT USE OF INSULIN: ICD-10-CM

## 2023-03-08 DIAGNOSIS — Z98.61 HISTORY OF PERCUTANEOUS CORONARY INTERVENTION: ICD-10-CM

## 2023-03-08 DIAGNOSIS — I70.0 AORTIC ATHEROSCLEROSIS: ICD-10-CM

## 2023-03-08 DIAGNOSIS — I50.32 CHRONIC HEART FAILURE WITH PRESERVED EJECTION FRACTION: ICD-10-CM

## 2023-03-08 DIAGNOSIS — Z95.1 HX OF CABG: ICD-10-CM

## 2023-03-08 DIAGNOSIS — E08.22 DIABETES MELLITUS DUE TO UNDERLYING CONDITION WITH STAGE 3A CHRONIC KIDNEY DISEASE, WITH LONG-TERM CURRENT USE OF INSULIN: ICD-10-CM

## 2023-03-08 PROCEDURE — 1125F AMNT PAIN NOTED PAIN PRSNT: CPT | Mod: CPTII,S$GLB,, | Performed by: INTERNAL MEDICINE

## 2023-03-08 PROCEDURE — 1101F PR PT FALLS ASSESS DOC 0-1 FALLS W/OUT INJ PAST YR: ICD-10-PCS | Mod: CPTII,S$GLB,, | Performed by: INTERNAL MEDICINE

## 2023-03-08 PROCEDURE — 3075F PR MOST RECENT SYSTOLIC BLOOD PRESS GE 130-139MM HG: ICD-10-PCS | Mod: CPTII,S$GLB,, | Performed by: INTERNAL MEDICINE

## 2023-03-08 PROCEDURE — 99215 PR OFFICE/OUTPT VISIT, EST, LEVL V, 40-54 MIN: ICD-10-PCS | Mod: S$GLB,,, | Performed by: INTERNAL MEDICINE

## 2023-03-08 PROCEDURE — 3051F PR MOST RECENT HEMOGLOBIN A1C LEVEL 7.0 - < 8.0%: ICD-10-PCS | Mod: CPTII,S$GLB,, | Performed by: INTERNAL MEDICINE

## 2023-03-08 PROCEDURE — 3051F HG A1C>EQUAL 7.0%<8.0%: CPT | Mod: CPTII,S$GLB,, | Performed by: INTERNAL MEDICINE

## 2023-03-08 PROCEDURE — 3078F PR MOST RECENT DIASTOLIC BLOOD PRESSURE < 80 MM HG: ICD-10-PCS | Mod: CPTII,S$GLB,, | Performed by: INTERNAL MEDICINE

## 2023-03-08 PROCEDURE — 3288F PR FALLS RISK ASSESSMENT DOCUMENTED: ICD-10-PCS | Mod: CPTII,S$GLB,, | Performed by: INTERNAL MEDICINE

## 2023-03-08 PROCEDURE — 99999 PR PBB SHADOW E&M-EST. PATIENT-LVL V: CPT | Mod: PBBFAC,,, | Performed by: INTERNAL MEDICINE

## 2023-03-08 PROCEDURE — 3078F DIAST BP <80 MM HG: CPT | Mod: CPTII,S$GLB,, | Performed by: INTERNAL MEDICINE

## 2023-03-08 PROCEDURE — 1160F PR REVIEW ALL MEDS BY PRESCRIBER/CLIN PHARMACIST DOCUMENTED: ICD-10-PCS | Mod: CPTII,S$GLB,, | Performed by: INTERNAL MEDICINE

## 2023-03-08 PROCEDURE — 3008F BODY MASS INDEX DOCD: CPT | Mod: CPTII,S$GLB,, | Performed by: INTERNAL MEDICINE

## 2023-03-08 PROCEDURE — 1160F RVW MEDS BY RX/DR IN RCRD: CPT | Mod: CPTII,S$GLB,, | Performed by: INTERNAL MEDICINE

## 2023-03-08 PROCEDURE — 3288F FALL RISK ASSESSMENT DOCD: CPT | Mod: CPTII,S$GLB,, | Performed by: INTERNAL MEDICINE

## 2023-03-08 PROCEDURE — 3075F SYST BP GE 130 - 139MM HG: CPT | Mod: CPTII,S$GLB,, | Performed by: INTERNAL MEDICINE

## 2023-03-08 PROCEDURE — 1101F PT FALLS ASSESS-DOCD LE1/YR: CPT | Mod: CPTII,S$GLB,, | Performed by: INTERNAL MEDICINE

## 2023-03-08 PROCEDURE — 1125F PR PAIN SEVERITY QUANTIFIED, PAIN PRESENT: ICD-10-PCS | Mod: CPTII,S$GLB,, | Performed by: INTERNAL MEDICINE

## 2023-03-08 PROCEDURE — 99215 OFFICE O/P EST HI 40 MIN: CPT | Mod: S$GLB,,, | Performed by: INTERNAL MEDICINE

## 2023-03-08 PROCEDURE — 93000 EKG 12-LEAD: ICD-10-PCS | Mod: S$GLB,,, | Performed by: INTERNAL MEDICINE

## 2023-03-08 PROCEDURE — 93000 ELECTROCARDIOGRAM COMPLETE: CPT | Mod: S$GLB,,, | Performed by: INTERNAL MEDICINE

## 2023-03-08 PROCEDURE — 99999 PR PBB SHADOW E&M-EST. PATIENT-LVL V: ICD-10-PCS | Mod: PBBFAC,,, | Performed by: INTERNAL MEDICINE

## 2023-03-08 PROCEDURE — 1159F PR MEDICATION LIST DOCUMENTED IN MEDICAL RECORD: ICD-10-PCS | Mod: CPTII,S$GLB,, | Performed by: INTERNAL MEDICINE

## 2023-03-08 PROCEDURE — 1159F MED LIST DOCD IN RCRD: CPT | Mod: CPTII,S$GLB,, | Performed by: INTERNAL MEDICINE

## 2023-03-08 PROCEDURE — 3008F PR BODY MASS INDEX (BMI) DOCUMENTED: ICD-10-PCS | Mod: CPTII,S$GLB,, | Performed by: INTERNAL MEDICINE

## 2023-03-08 NOTE — PROGRESS NOTES
CARDIOVASCULAR PROGRESS NOTE    REASON FOR CONSULT:   Driss Hernandez Jr. is a 72 y.o. male who presents for follow up of CAD, HFpEF, Chr AF.    PCP: Ehrensing  Ortho: [facility]  HISTORY OF PRESENT ILLNESS:   The patient comes in today for follow-up sooner than expected.  He is describing some dyspnea on exertion and exertional fatigue.  He did report doing some construction work recently and thinks he may have inhaled some dust.  He otherwise has had no esteban  angina, palpitations, or syncope.  There has been no PND, orthopnea, or lower extremity edema.  Denies melena, hematuria, or claudicant symptoms.    CARDIOVASCULAR HISTORY:   CAD 2016 CABG x3 (LIMA-LAD, SVG-D, SVG-PDA)   11/11/20 cath with diffuse native disease, patent SVG x2, LIMA-LAD atretic   12/10/20: PCI OM2 2.25x26 [facility]    HFpEF    PAD    Chr AF on eliquis 5mg bid    PAST MEDICAL HISTORY:     Past Medical History:   Diagnosis Date    Chronic heart failure with preserved ejection fraction 2/9/2023    Chronic midline low back pain without sciatica 10/2/2017    Colon polyps     Coronary artery disease involving native coronary artery of native heart 3/5/2020    Coronary artery disease involving native coronary artery of native heart with angina pectoris 12/10/2020    Diabetes mellitus with neurological manifestations, uncontrolled 1/24/2017    Diabetic polyneuropathy associated with type 2 diabetes mellitus 1/24/2017    Diabetic polyneuropathy associated with type 2 diabetes mellitus     Essential hypertension 1/24/2017    Gastroesophageal reflux disease 1/24/2017    Hyperlipidemia 1/24/2017    Insomnia 1/24/2017    Long-term insulin use 1/24/2017    Longstanding persistent atrial fibrillation 12/30/2020    Lumbar spondylosis 11/13/2017    Nuclear sclerosis of both eyes 8/24/2017    Obesity     PAD (peripheral artery disease) 3/23/2022    Stage 3 chronic kidney disease 2/13/2019    Uncontrolled type 2 diabetes mellitus without complication,  with long-term current use of insulin 1/24/2017       PAST SURGICAL HISTORY:     Past Surgical History:   Procedure Laterality Date    ARTHROSCOPIC REPAIR OF ROTATOR CUFF OF SHOULDER Right 7/5/2022    Procedure: REPAIR, ROTATOR CUFF, ARTHROSCOPIC;  Surgeon: Valerie Olivera MD;  Location: [facility] OR;  Service: Orthopedics;  Laterality: Right;  HETAL LEMUS [phone]/ TEXTED ALLAN ON 6/23/2022 @ 9:47AM. ALLAN RESPONDED ON 6/23/2022 @ 10:33AM-BREANNA BABIN [phone] MY BE HERE FOR CASE SPOKE TO HIM @ 9:59AM ON 7-1-2022  RN PREOP 6/28/2022    BACK SURGERY      2002    CATARACT EXTRACTION W/  INTRAOCULAR LENS IMPLANT Right 08/29/2017    Dr. Hong    CATARACT EXTRACTION W/  INTRAOCULAR LENS IMPLANT Left 02/24/2022    Dr. Hong    COLONOSCOPY N/A 2/21/2017    Procedure: COLONOSCOPY;  Surgeon: Robb Rosado MD;  Location: [facility] ENDO;  Service: Endoscopy;  Laterality: N/A;    CORONARY ANGIOGRAPHY INCLUDING BYPASS GRAFTS WITH CATHETERIZATION OF LEFT HEART N/A 11/11/2020    Procedure: ANGIOGRAM, CORONARY, INCLUDING BYPASS GRAFT, WITH LEFT HEART CATHETERIZATION;  Surgeon: Lane Cuellar MD;  Location: [facility] CATH LAB;  Service: Cardiology;  Laterality: N/A;  RN PRE OP 11-5-2020. --COVID NEGATIVE ON  11-. C A    CORONARY ARTERY BYPASS GRAFT      March 2016    EPIDURAL STEROID INJECTION Bilateral 11/14/2018    Procedure: Lumbar Medial Branch Blocks;  Surgeon: Eze Byrd Jr., MD;  Location: [facility] ENDO;  Service: Pain Management;  Laterality: Bilateral;  Bilateral Lumbar Medial Branch Blocks L2, L3, L4, L5    99529  00913    Arrive @ 1150; NO Sedation    EPIDURAL STEROID INJECTION Bilateral 11/28/2018    Procedure: Injection, Steroid, Epidural;  Surgeon: Eze Byrd Jr., MD;  Location: [facility] ENDO;  Service: Pain Management;  Laterality: Bilateral;  Bilateral Sacroiliac Joint Steroid Injections    28516    Arrive @ 0910    EPIDURAL STEROID INJECTION Bilateral 3/20/2019     "Procedure: Injection, Steroid, Sacroiliiac Joint;  Surgeon: Eze Byrd Jr., MD;  Location: [facility] ENDO;  Service: Pain Management;  Laterality: Bilateral;  Bilateral Sacroiliac Joint Steroid Injections    59984    Arrive @ 1145; Trigger point injections also?    INTRAOCULAR PROSTHESES INSERTION Left 2/24/2022    Procedure: INSERTION, IOL PROSTHESIS;  Surgeon: Nickolas Hong MD;  Location: [facility] OR;  Service: Ophthalmology;  Laterality: Left;    PHACOEMULSIFICATION OF CATARACT Left 2/24/2022    Procedure: PHACOEMULSIFICATION, CATARACT;  Surgeon: Nickolas Hong MD;  Location: [facility] OR;  Service: Ophthalmology;  Laterality: Left;  RN Phone Pre Op 2-16-22.  Covid NEGATIVE  2-23-22.  Arrival 08:00 am.    TONSILLECTOMY         ALLERGIES AND MEDICATION:     Review of patient's allergies indicates:   Allergen Reactions    Penicillins Other (See Comments)     Unknown reaction, Had a reaction as a child    Shrimp Itching     Hand itching        Medication List            Accurate as of March 8, 2023 10:08 AM. If you have any questions, ask your nurse or doctor.                CONTINUE taking these medications      ACCU-CHEK MOIZ CONTROL SOLN Soln  Generic drug: blood glucose control high,low     ACCU-CHEK MOIZ PLUS TEST STRP Strp  Generic drug: blood sugar diagnostic  TEST THREE TIMES DAILY     ACCU-CHEK SOFTCLIX LANCETS Misc  Generic drug: lancets     albuterol 90 mcg/actuation inhaler  Commonly known as: PROVENTIL/VENTOLIN HFA  Inhale 2 puffs into the lungs every 4 (four) hours as needed for Shortness of Breath. Rescue     ascorbic acid (vitamin C) 100 MG tablet  Commonly known as: VITAMIN C     atorvastatin 80 MG tablet  Commonly known as: LIPITOR  Take 1 tablet (80 mg total) by mouth every evening.     b complex vitamins tablet     BD ALCOHOL SWABS Padm  Generic drug: alcohol swabs     BD ULTRA-FINE ROLAND PEN NEEDLE 32 gauge x 5/32" Ndle  Generic drug: pen needle, diabetic     celecoxib 100 MG " capsule  Commonly known as: CeleBREX  TAKE 1 CAPSULE(100 MG) BY MOUTH EVERY DAY     clopidogreL 75 mg tablet  Commonly known as: PLAVIX  TAKE 1 TABLET(75 MG) BY MOUTH EVERY DAY     coenzyme Q10 100 mg capsule     dapagliflozin 5 mg Tab tablet  Commonly known as: FARXIGA  Take 1 tablet (5 mg total) by mouth once daily.     ELIQUIS 5 mg Tab  Generic drug: apixaban  TAKE 1 TABLET TWICE DAILY     ergocalciferol 50,000 unit Cap  Commonly known as: VITAMIN D2  TAKE ONE CAPSULE BY MOUTH WEEKLY     fenofibrate 160 MG Tab  TAKE 1 TABLET EVERY MORNING     furosemide 20 MG tablet  Commonly known as: LASIX  Take 1 tablet (20 mg total) by mouth 2 (two) times daily.     gabapentin 100 MG capsule  Commonly known as: NEURONTIN  TAKE 2 CAPSULES EVERY MORNING AND TAKE 3 CAPSULES EVERY DAY WITH DINNER     glimepiride 2 MG tablet  Commonly known as: AMARYL  TAKE 1 TABLET EVERY DAY BEFORE BREAKFAST     hydrALAZINE 50 MG tablet  Commonly known as: APRESOLINE  Take 1 tablet (50 mg total) by mouth 2 (two) times a day.     HYDROcodone-acetaminophen 5-325 mg per tablet  Commonly known as: NORCO  Take 1 tablet by mouth every 8 (eight) hours as needed for Pain.     insulin degludec 100 unit/mL (3 mL) insulin pen  Commonly known as: TRESIBA FLEXTOUCH U-100     isosorbide mononitrate 60 MG 24 hr tablet  Commonly known as: IMDUR  Take 1 tablet (60 mg total) by mouth once daily.     magnesium 250 mg Tab     metFORMIN 500 MG ER 24hr tablet  Commonly known as: GLUCOPHAGE-XR  Take 1 tablet with breakfast and 2 tablets with supper.     nitroGLYCERIN 0.4 MG SL tablet  Commonly known as: NITROSTAT  Place 1 tablet (0.4 mg total) under the tongue every 5 (five) minutes as needed for Chest pain.     omega-3 acid ethyl esters 1 gram capsule  Commonly known as: LOVAZA  Take 2 capsules (2 g total) by mouth 2 (two) times daily.     ondansetron 4 MG Tbdl  Commonly known as: ZOFRAN-ODT  Dissolve 1 tablet (4 mg total) by mouth every 6 (six) hours as needed  (nausea).     pantoprazole 40 MG tablet  Commonly known as: PROTONIX  TAKE 1 TABLET EVERY DAY     semaglutide 2 mg/dose (8 mg/3 mL) Pnij  Commonly known as: OZEMPIC  Inject 2 mg into the skin every 7 days.     tiZANidine 4 MG tablet  Commonly known as: ZANAFLEX     triazolam 0.25 MG Tab  Commonly known as: HALCION  TAKE 1 TABLET BY MOUTH EVERY NIGHT AT BEDTIME AS NEEDED              SOCIAL HISTORY:     Social History     Socioeconomic History    Marital status:    Tobacco Use    Smoking status: Never    Smokeless tobacco: Never   Substance and Sexual Activity    Alcohol use: Yes     Comment: rare occassion    Drug use: No    Sexual activity: Yes     Partners: Female     Social Determinants of Health     Financial Resource Strain: Low Risk     Difficulty of Paying Living Expenses: Not hard at all   Food Insecurity: No Food Insecurity    Worried About Running Out of Food in the Last Year: Never true    Ran Out of Food in the Last Year: Never true   Transportation Needs: No Transportation Needs    Lack of Transportation (Medical): No    Lack of Transportation (Non-Medical): No   Physical Activity: Sufficiently Active    Days of Exercise per Week: 5 days    Minutes of Exercise per Session: 60 min   Stress: Stress Concern Present    Feeling of Stress : To some extent   Social Connections: Moderately Integrated    Frequency of Communication with Friends and Family: More than three times a week    Frequency of Social Gatherings with Friends and Family: Three times a week    Attends L[employer] day Services: Never    Active Member of Clubs or Organizations: Yes    Attends Club or Organization Meetings: More than 4 times per year    Marital Status:    Housing Stability: Low Risk     Unable to Pay for Housing in the Last Year: No    Number of Places Lived in the Last Year: 1    Unstable Housing in the Last Year: No       FAMILY HISTORY:     Family History   Problem Relation Age of Onset    Depression Mother     Heart  "disease Mother     Stroke Father     No Known Problems Sister     Diabetes Sister     No Known Problems Sister     Blindness Brother     No Known Problems Maternal Aunt     No Known Problems Maternal Uncle     No Known Problems Paternal Aunt     No Known Problems Paternal Uncle     No Known Problems Maternal Grandmother     No Known Problems Maternal Grandfather     No Known Problems Paternal Grandmother     No Known Problems Paternal Grandfather     Amblyopia Neg Hx     Cancer Neg Hx     Cataracts Neg Hx     Glaucoma Neg Hx     Hypertension Neg Hx     Macular degeneration Neg Hx     Retinal detachment Neg Hx     Strabismus Neg Hx     Thyroid disease Neg Hx        REVIEW OF SYSTEMS:   Review of Systems   Constitutional:  Negative for chills, diaphoresis and fever.   HENT:  Negative for nosebleeds.    Eyes:  Negative for blurred vision, double vision and photophobia.   Respiratory:  Positive for cough and shortness of breath. Negative for hemoptysis and wheezing.    Cardiovascular:  Negative for chest pain, palpitations, orthopnea, claudication, leg swelling and PND.   Gastrointestinal:  Negative for abdominal pain, blood in stool, heartburn, melena, nausea and vomiting.   Genitourinary:  Negative for flank pain and hematuria.   Musculoskeletal:  Negative for falls, joint pain, myalgias and neck pain.   Skin:  Negative for rash.   Neurological:  Negative for dizziness, seizures, loss of consciousness, weakness and headaches.   Endo/Heme/Allergies:  Negative for polydipsia. Does not bruise/bleed easily.   Psychiatric/Behavioral:  Negative for depression and memory loss. The patient is not nervous/anxious.      PHYSICAL EXAM:     Vitals:    03/08/23 1004   BP: 138/78   Pulse: 61   Resp: 18    Body mass index is 27.62 kg/m².  Weight: 80 kg (176 lb 5.9 oz)   Height: 5' 7" (170.2 cm)     Physical Exam  Vitals reviewed.   Constitutional:       General: He is not in acute distress.     Appearance: Normal appearance. He is " well-developed. He is not ill-appearing, toxic-appearing or diaphoretic.   HENT:      Head: Normocephalic and atraumatic.   Eyes:      General: No scleral icterus.     Extraocular Movements: Extraocular movements intact.      Conjunctiva/sclera: Conjunctivae normal.      Pupils: Pupils are equal, round, and reactive to light.   Neck:      Thyroid: No thyromegaly.      Vascular: Normal carotid pulses. No carotid bruit or JVD.      Trachea: Trachea normal.   Cardiovascular:      Rate and Rhythm: Bradycardia present. Rhythm irregularly irregular.      Heart sounds: S1 normal and S2 normal. Heart sounds are distant. No murmur heard.    No friction rub. No gallop.   Pulmonary:      Effort: Pulmonary effort is normal. No respiratory distress.      Breath sounds: Normal breath sounds. No stridor. No wheezing, rhonchi or rales.   Chest:      Chest wall: No tenderness.   Abdominal:      General: There is no distension.      Palpations: Abdomen is soft.   Musculoskeletal:         General: No swelling or tenderness.      Cervical back: Normal range of motion and neck supple. No edema or rigidity.      Right lower leg: No edema.      Left lower leg: No edema.      Comments: R shoulder limited ROM   Feet:      Right foot:      Skin integrity: No ulcer.      Left foot:      Skin integrity: No ulcer.   Skin:     General: Skin is warm and dry.      Coloration: Skin is not jaundiced.   Neurological:      General: No focal deficit present.      Mental Status: He is alert and oriented to person, place, and time.      Cranial Nerves: No cranial nerve deficit.   Psychiatric:         Mood and Affect: Mood normal.         Speech: Speech normal.         Behavior: Behavior normal. Behavior is cooperative.       DATA:   EKG: (personally reviewed tracing(s))  3/8/23 AF 54    Laboratory:  CBC:  Recent Labs   Lab 12/07/20  1200 03/30/22  0908 06/09/22  0826   WBC 5.82 4.72 5.56   Hemoglobin 10.6 L 9.5 L 10.8 L   Hematocrit 33.8 L 32.0 L 36.7 L    Platelets 231 188 319         CHEMISTRIES:  Recent Labs   Lab 04/28/22  0923 05/11/22  0722 06/09/22  0826 08/11/22  0931 09/13/22  0658 10/05/22  1116 11/11/22  0729   Glucose 146 H  --  126 H  --  99 267 H 196 H   Sodium 141  --  143  --  140 136 139   Potassium 4.9  --  4.9  --  4.5 5.7 H 4.9   BUN 30 H  --  33 H  --  26 H 52 H 25 H   Creatinine 1.5 H 1.5 H 1.5 H   < > 1.5 H 1.8 H 1.4   eGFR if  53 A 53.0 A 53.0 A  --   --   --   --    eGFR if non  46 A 45.9 A 45.9 A  --   --   --   --    Calcium 9.7  --  10.2  --  9.9 10.6 H 9.5    < > = values in this interval not displayed.         CARDIAC BIOMARKERS:        COAGS:  Recent Labs   Lab 04/01/21  0033 04/21/21  0921 05/10/21  0945   INR 2.4 H 2.0 H 2.9 H         LIPIDS/LFTS:  Recent Labs   Lab 08/18/20  0928 02/10/22  1014 05/11/22  0722 06/09/22  0826 09/13/22  0658   Cholesterol 137 163 123  --  133   Triglycerides 125 123 116  --  144   HDL 25 L 27 L 22 L  --  25 L   LDL Cholesterol 87.0 111.4 77.8  --  79.2   Non-HDL Cholesterol 112 136 101  --  108   AST 18  --   --  19 18   ALT 18  --   --  13 12         Cardiovascular Testing:  Carotid US 9/13/22  There is 40-49% right Internal Carotid Stenosis.  There is 60-69% left Internal Carotid Stenosis.  >50% R ECA stenosis.  Similar ICA findings noted on report 3/30/22.    LE venous US 4/20/22  There is no evidence of a right lower extremity DVT.  There is no evidence of a left lower extremity DVT.  1.2x0.6cm non vascular structure noted in left proximal calf.    Echo 3/30/22  The left ventricle is normal in size with mild concentric hypertrophy and normal systolic function.  The estimated ejection fraction is 60%.  Mild right ventricular enlargement with low normal right ventricular systolic function.  Mild mitral regurgitation.  Mild tricuspid regurgitation.  The estimated PA systolic pressure is 22 mmHg.  Atrial fibrillation observed.    Holter 5/19/21  Atrial fibrillation.  Heart rates varied between 31 and 112 bpm with an average of 57 bpm.  There were rare PVCs totalling 100 and averaging 4.17 per hour.  Longest RR interval 3.3 seconds    L MPI 5/19/21    Abnormal myocardial perfusion scan.    There is a mild intensity, fixed defect consistent with scar in the anterior wall.    There is a second moderate intensity, reversible defect that is consistent with ischemia in the inferior wall.    The gated perfusion images showed an ejection fraction of 61% post stress.    The EKG portion of this study is negative for ischemia.    The patient reported no chest pain during the stress test.    There were no arrhythmias during stress.    PCI OM1 12/10/20 (images personally reviewed and interpreted)  1.  90% mid OM2 stenosis.  2.  Successful PCI with placement of a 2.25 x 26 mm drug-eluting stent reducing the 90% stenosis to 0%.  INGRIS 3 flow.  No dissection.  Good angiographic results.    Cath 11/11/20 (images personally reviewed and interpreted)  Left Main   The vessel is angiographically normal.   Left Anterior Descending   Subtotal mid occlusion. Diffusely diseased distal vessel   Left Circumflex   Long mid 80% between OM1 and OM2   First Obtuse Marginal Branch   90% mid   Second Obtuse Marginal Branch   80% mid   Right Coronary Artery   80% mid - moderate disffuse distal disease   LIMA Graft To Mid LAD   Mid to distal graft diffusely diseased 0 multile 80-90% lesions. Small diffusely diseased distal LAD   Saphenous Graft To RPDA   Patent with good runoff to PDA   Graft To 1st Diag   Patent to D1 with good runoff     Ex NUSRAT 4/18/18  Resting NUSRAT:   The right ankle brachial index was 1.04 which is normal.   The left ankle brachial index was 1.08 which is normal.   The right TBI is 0.55.   The left TBI is 0.98.   Exercise NUSRAT:   Post exercise right ankle pressure was 113 mmHg, resulting in a right post-exercise NUSRAT of 0.67.   Post exercise left ankle pressure was 150 mmHg, resulting in a left  post-exercise NUSRAT of 0.89.       ASSESSMENT:   # CAD s/p CABG/PCI OM 12/2020, now with MAYEN.  # HTN, controlled  # HFpEF, euvolemic  # CKD3a, elev creat/K+ with losartan, normalized when stopped losartan.  # Chr AF on eliquis 5mg bid, HR controlled (?too slow)  # HLP on atorva 80mg  # PAD, abnl NUSRAT 4/18/18  # carotid atherosclerosis (CUS 9/2022)  # L>R LE edema, resolved.  LE venous US 4/2022 neg.  # aortic atherosclerosis (CT Chest 10/29/18)    PLAN:   Cont med rx  Cont Plavix 75mg qd  Cont eliquis 5mg bid  Ex MPI  Echo  Holter (?needs PPM with slow AF not on AVN blockers)  Carotid US  RTC 1 month    Eze Purdy MD, FACC

## 2023-03-13 ENCOUNTER — HOSPITAL ENCOUNTER (OUTPATIENT)
Dept: RADIOLOGY | Facility: HOSPITAL | Age: 73
Discharge: HOME | End: 2023-03-13
Attending: INTERNAL MEDICINE
Payer: MEDICARE

## 2023-03-13 ENCOUNTER — HOSPITAL ENCOUNTER (OUTPATIENT)
Dept: CARDIOLOGY | Facility: HOSPITAL | Age: 73
Discharge: HOME | End: 2023-03-13
Attending: INTERNAL MEDICINE
Payer: MEDICARE

## 2023-03-13 ENCOUNTER — TELEPHONE (OUTPATIENT)
Dept: CARDIOLOGY | Facility: CLINIC | Age: 73
End: 2023-03-13

## 2023-03-13 DIAGNOSIS — Z95.1 HX OF CABG: ICD-10-CM

## 2023-03-13 DIAGNOSIS — I65.23 ATHEROSCLEROSIS OF BOTH CAROTID ARTERIES: ICD-10-CM

## 2023-03-13 DIAGNOSIS — I25.810 CORONARY ARTERY DISEASE INVOLVING CORONARY BYPASS GRAFT OF NATIVE HEART WITHOUT ANGINA PECTORIS: ICD-10-CM

## 2023-03-13 DIAGNOSIS — Z98.61 HISTORY OF PERCUTANEOUS CORONARY INTERVENTION: ICD-10-CM

## 2023-03-13 DIAGNOSIS — R94.31 ABNORMAL EKG: ICD-10-CM

## 2023-03-13 DIAGNOSIS — R06.09 DOE (DYSPNEA ON EXERTION): ICD-10-CM

## 2023-03-13 DIAGNOSIS — I48.11 LONGSTANDING PERSISTENT ATRIAL FIBRILLATION: ICD-10-CM

## 2023-03-13 LAB
ASCENDING AORTA: 3.21 CM
AV INDEX (PROSTH): 0.77
AV MEAN GRADIENT: 6 MMHG
AV PEAK GRADIENT: 10 MMHG
AV VALVE AREA: 2.41 CM2
AV VELOCITY RATIO: 0.69
CV ECHO LV RWT: 0.45 CM
CV STRESS BASE HR: 52 BPM
DIASTOLIC BLOOD PRESSURE: 61 MMHG
DOP CALC AO PEAK VEL: 1.56 M/S
DOP CALC AO VTI: 32.6 CM
DOP CALC LVOT AREA: 3.1 CM2
DOP CALC LVOT DIAMETER: 2 CM
DOP CALC LVOT PEAK VEL: 1.07 M/S
DOP CALC LVOT STROKE VOLUME: 78.5 CM3
DOP CALCLVOT PEAK VEL VTI: 25 CM
ECHO LV POSTERIOR WALL: 1.14 CM (ref 0.6–1.1)
EJECTION FRACTION: 55 %
FRACTIONAL SHORTENING: 36 % (ref 28–44)
INTERVENTRICULAR SEPTUM: 1.1 CM (ref 0.6–1.1)
IVC DIAMETER: 20 CM
IVRT: 83.04 MSEC
LA MAJOR: 6.02 CM
LA MINOR: 5.79 CM
LA WIDTH: 4.6 CM
LEFT ATRIUM SIZE: 5.14 CM
LEFT ATRIUM VOLUME: 118.63 CM3
LEFT CBA DIAS: 13 CM/S
LEFT CBA SYS: 86 CM/S
LEFT CCA DIST DIAS: 9 CM/S
LEFT CCA DIST SYS: 84 CM/S
LEFT CCA MID DIAS: 11 CM/S
LEFT CCA MID SYS: 81 CM/S
LEFT CCA PROX DIAS: 10 CM/S
LEFT CCA PROX SYS: 77 CM/S
LEFT ECA DIAS: 7 CM/S
LEFT ECA SYS: 215 CM/S
LEFT ICA DIST DIAS: 21 CM/S
LEFT ICA DIST SYS: 85 CM/S
LEFT ICA MID DIAS: 31 CM/S
LEFT ICA MID SYS: 316 CM/S
LEFT ICA PROX DIAS: 73 CM/S
LEFT ICA PROX SYS: 253 CM/S
LEFT INTERNAL DIMENSION IN SYSTOLE: 3.25 CM (ref 2.1–4)
LEFT VENTRICLE DIASTOLIC VOLUME: 120.43 ML
LEFT VENTRICLE SYSTOLIC VOLUME: 42.56 ML
LEFT VENTRICULAR INTERNAL DIMENSION IN DIASTOLE: 5.04 CM (ref 3.5–6)
LEFT VENTRICULAR MASS: 215.09 G
LEFT VERTEBRAL DIAS: 17 CM/S
LEFT VERTEBRAL SYS: 101 CM/S
LVOT MG: 2.4 MMHG
LVOT MV: 0.7 CM/S
MV PEAK E VEL: 1.28 M/S
NUC STRESS DIASTOLIC VOLUME INDEX: 71
NUC STRESS EJECTION FRACTION: 66 %
NUC STRESS SYSTOLIC VOLUME INDEX: 24
OHS CV CAROTID RIGHT ICA EDV HIGHEST: 51
OHS CV CAROTID ULTRASOUND LEFT ICA/CCA RATIO: 3.76
OHS CV CAROTID ULTRASOUND RIGHT ICA/CCA RATIO: 2.29
OHS CV CPX 1 MINUTE RECOVERY HEART RATE: 103 BPM
OHS CV CPX 85 PERCENT MAX PREDICTED HEART RATE MALE: 125
OHS CV CPX ESTIMATED METS: 9
OHS CV CPX MAX PREDICTED HEART RATE: 147
OHS CV CPX PATIENT IS FEMALE: 0
OHS CV CPX PATIENT IS MALE: 1
OHS CV CPX PEAK DIASTOLIC BLOOD PRESSURE: 69 MMHG
OHS CV CPX PEAK HEAR RATE: 127 BPM
OHS CV CPX PEAK RATE PRESSURE PRODUCT: NORMAL
OHS CV CPX PEAK SYSTOLIC BLOOD PRESSURE: 166 MMHG
OHS CV CPX PERCENT MAX PREDICTED HEART RATE ACHIEVED: 86
OHS CV CPX RATE PRESSURE PRODUCT PRESENTING: 7228
OHS CV PV CAROTID LEFT HIGHEST CCA: 84
OHS CV PV CAROTID LEFT HIGHEST ICA: 316
OHS CV PV CAROTID RIGHT HIGHEST CCA: 87
OHS CV PV CAROTID RIGHT HIGHEST ICA: 199
OHS CV US CAROTID LEFT HIGHEST EDV: 73
PISA TR MAX VEL: 1.4 M/S
PV PEAK VELOCITY: 1.1 CM/S
RA MAJOR: 5.62 CM
RA PRESSURE: 3 MMHG
RA WIDTH: 4 CM
RIGHT CBA DIAS: 20 CM/S
RIGHT CBA SYS: 129 CM/S
RIGHT CCA DIST DIAS: 14 CM/S
RIGHT CCA DIST SYS: 87 CM/S
RIGHT CCA MID DIAS: 7 CM/S
RIGHT CCA MID SYS: 62 CM/S
RIGHT CCA PROX DIAS: 11 CM/S
RIGHT CCA PROX SYS: 82 CM/S
RIGHT ECA DIAS: 3 CM/S
RIGHT ECA SYS: 225 CM/S
RIGHT ICA DIST DIAS: 44 CM/S
RIGHT ICA DIST SYS: 163 CM/S
RIGHT ICA MID DIAS: 23 CM/S
RIGHT ICA MID SYS: 145 CM/S
RIGHT ICA PROX DIAS: 51 CM/S
RIGHT ICA PROX SYS: 199 CM/S
RIGHT VENTRICULAR END-DIASTOLIC DIMENSION: 4.01 CM
RIGHT VERTEBRAL DIAS: 9 CM/S
RIGHT VERTEBRAL SYS: 54 CM/S
SINUS: 3.3 CM
STJ: 1.72 CM
STRESS ECHO POST EXERCISE DUR MIN: 8 MINUTES
STRESS ECHO POST EXERCISE DUR SEC: 16 SECONDS
SYSTOLIC BLOOD PRESSURE: 139 MMHG
TR MAX PG: 8 MMHG
TRICUSPID ANNULAR PLANE SYSTOLIC EXCURSION: 1.6 CM
TV REST PULMONARY ARTERY PRESSURE: 11 MMHG

## 2023-03-13 PROCEDURE — 78452 NUCLEAR STRESS - CARDIOLOGY INTERPRETED (CUPID ONLY): ICD-10-PCS | Mod: 26,,, | Performed by: INTERNAL MEDICINE

## 2023-03-13 PROCEDURE — 93016 NUCLEAR STRESS - CARDIOLOGY INTERPRETED (CUPID ONLY): ICD-10-PCS | Mod: ,,, | Performed by: INTERNAL MEDICINE

## 2023-03-13 PROCEDURE — 93306 ECHO (CUPID ONLY): ICD-10-PCS | Mod: 26,,, | Performed by: INTERNAL MEDICINE

## 2023-03-13 PROCEDURE — 93880 EXTRACRANIAL BILAT STUDY: CPT

## 2023-03-13 PROCEDURE — 93018 CV STRESS TEST I&R ONLY: CPT | Mod: ,,, | Performed by: INTERNAL MEDICINE

## 2023-03-13 PROCEDURE — 93306 TTE W/DOPPLER COMPLETE: CPT | Mod: 26,,, | Performed by: INTERNAL MEDICINE

## 2023-03-13 PROCEDURE — 78452 HT MUSCLE IMAGE SPECT MULT: CPT

## 2023-03-13 PROCEDURE — 93018 NUCLEAR STRESS - CARDIOLOGY INTERPRETED (CUPID ONLY): ICD-10-PCS | Mod: ,,, | Performed by: INTERNAL MEDICINE

## 2023-03-13 PROCEDURE — A9502 TC99M TETROFOSMIN: HCPCS

## 2023-03-13 PROCEDURE — 93880 EXTRACRANIAL BILAT STUDY: CPT | Mod: 26,,, | Performed by: INTERNAL MEDICINE

## 2023-03-13 PROCEDURE — 93016 CV STRESS TEST SUPVJ ONLY: CPT | Mod: ,,, | Performed by: INTERNAL MEDICINE

## 2023-03-13 PROCEDURE — 93225 XTRNL ECG REC<48 HRS REC: CPT

## 2023-03-13 PROCEDURE — 93227 XTRNL ECG REC<48 HR R&I: CPT | Mod: ,,, | Performed by: INTERNAL MEDICINE

## 2023-03-13 PROCEDURE — 93227 HOLTER MONITOR - 24 HOUR (CUPID ONLY): ICD-10-PCS | Mod: ,,, | Performed by: INTERNAL MEDICINE

## 2023-03-13 PROCEDURE — 93306 TTE W/DOPPLER COMPLETE: CPT

## 2023-03-13 PROCEDURE — 93017 CV STRESS TEST TRACING ONLY: CPT

## 2023-03-13 PROCEDURE — 78452 HT MUSCLE IMAGE SPECT MULT: CPT | Mod: 26,,, | Performed by: INTERNAL MEDICINE

## 2023-03-13 PROCEDURE — 93880 CV US DOPPLER CAROTID (CUPID ONLY): ICD-10-PCS | Mod: 26,,, | Performed by: INTERNAL MEDICINE

## 2023-03-13 NOTE — TELEPHONE ENCOUNTER
----- Message from Eze Purdy MD sent at 3/13/2023  4:27 PM CDT -----  Call pt to let him know his studies stress test and carotid US were abnormal and offer earlier visit if available.

## 2023-03-13 NOTE — TELEPHONE ENCOUNTER
Called patient on today patient will keep scheduled appointment for abnormal results with Dr. Purdy

## 2023-03-14 LAB
OHS CV EVENT MONITOR DAY: 0
OHS CV HOLTER LENGTH DECIMAL HOURS: 23.98
OHS CV HOLTER LENGTH HOURS: 23
OHS CV HOLTER LENGTH MINUTES: 59
OHS CV HOLTER SINUS AVERAGE HR: 58
OHS CV HOLTER SINUS MAX HR: 99
OHS CV HOLTER SINUS MIN HR: 32

## 2023-03-20 ENCOUNTER — OFFICE VISIT (OUTPATIENT)
Dept: CARDIOLOGY | Facility: CLINIC | Age: 73
End: 2023-03-20
Payer: MEDICARE

## 2023-03-20 VITALS
HEIGHT: 67 IN | OXYGEN SATURATION: 99 % | DIASTOLIC BLOOD PRESSURE: 72 MMHG | SYSTOLIC BLOOD PRESSURE: 136 MMHG | WEIGHT: 181.69 LBS | RESPIRATION RATE: 18 BRPM | BODY MASS INDEX: 28.52 KG/M2 | HEART RATE: 61 BPM

## 2023-03-20 DIAGNOSIS — I65.23 ATHEROSCLEROSIS OF BOTH CAROTID ARTERIES: ICD-10-CM

## 2023-03-20 DIAGNOSIS — I10 ESSENTIAL HYPERTENSION: ICD-10-CM

## 2023-03-20 DIAGNOSIS — E08.22 DIABETES MELLITUS DUE TO UNDERLYING CONDITION WITH STAGE 3A CHRONIC KIDNEY DISEASE, WITH LONG-TERM CURRENT USE OF INSULIN: ICD-10-CM

## 2023-03-20 DIAGNOSIS — Z98.61 HISTORY OF PERCUTANEOUS CORONARY INTERVENTION: ICD-10-CM

## 2023-03-20 DIAGNOSIS — R94.31 ABNORMAL EKG: ICD-10-CM

## 2023-03-20 DIAGNOSIS — Z79.01 LONG TERM (CURRENT) USE OF ANTICOAGULANTS: ICD-10-CM

## 2023-03-20 DIAGNOSIS — I25.810 CORONARY ARTERY DISEASE INVOLVING CORONARY BYPASS GRAFT OF NATIVE HEART WITHOUT ANGINA PECTORIS: Primary | ICD-10-CM

## 2023-03-20 DIAGNOSIS — Z79.4 DIABETES MELLITUS DUE TO UNDERLYING CONDITION WITH STAGE 3A CHRONIC KIDNEY DISEASE, WITH LONG-TERM CURRENT USE OF INSULIN: ICD-10-CM

## 2023-03-20 DIAGNOSIS — N18.31 STAGE 3A CHRONIC KIDNEY DISEASE: ICD-10-CM

## 2023-03-20 DIAGNOSIS — I48.11 LONGSTANDING PERSISTENT ATRIAL FIBRILLATION: ICD-10-CM

## 2023-03-20 DIAGNOSIS — I70.0 AORTIC ATHEROSCLEROSIS: ICD-10-CM

## 2023-03-20 DIAGNOSIS — N18.31 DIABETES MELLITUS DUE TO UNDERLYING CONDITION WITH STAGE 3A CHRONIC KIDNEY DISEASE, WITH LONG-TERM CURRENT USE OF INSULIN: ICD-10-CM

## 2023-03-20 DIAGNOSIS — Z95.1 HX OF CABG: ICD-10-CM

## 2023-03-20 DIAGNOSIS — I50.32 CHRONIC HEART FAILURE WITH PRESERVED EJECTION FRACTION: ICD-10-CM

## 2023-03-20 DIAGNOSIS — E78.2 MIXED HYPERLIPIDEMIA: ICD-10-CM

## 2023-03-20 PROCEDURE — 99999 PR PBB SHADOW E&M-EST. PATIENT-LVL V: CPT | Mod: PBBFAC,,, | Performed by: INTERNAL MEDICINE

## 2023-03-20 PROCEDURE — 3075F SYST BP GE 130 - 139MM HG: CPT | Mod: CPTII,S$GLB,, | Performed by: INTERNAL MEDICINE

## 2023-03-20 PROCEDURE — 99214 OFFICE O/P EST MOD 30 MIN: CPT | Mod: S$GLB,,, | Performed by: INTERNAL MEDICINE

## 2023-03-20 PROCEDURE — 1126F PR PAIN SEVERITY QUANTIFIED, NO PAIN PRESENT: ICD-10-PCS | Mod: CPTII,S$GLB,, | Performed by: INTERNAL MEDICINE

## 2023-03-20 PROCEDURE — 1126F AMNT PAIN NOTED NONE PRSNT: CPT | Mod: CPTII,S$GLB,, | Performed by: INTERNAL MEDICINE

## 2023-03-20 PROCEDURE — 3288F FALL RISK ASSESSMENT DOCD: CPT | Mod: CPTII,S$GLB,, | Performed by: INTERNAL MEDICINE

## 2023-03-20 PROCEDURE — 1159F MED LIST DOCD IN RCRD: CPT | Mod: CPTII,S$GLB,, | Performed by: INTERNAL MEDICINE

## 2023-03-20 PROCEDURE — 1101F PT FALLS ASSESS-DOCD LE1/YR: CPT | Mod: CPTII,S$GLB,, | Performed by: INTERNAL MEDICINE

## 2023-03-20 PROCEDURE — 3078F DIAST BP <80 MM HG: CPT | Mod: CPTII,S$GLB,, | Performed by: INTERNAL MEDICINE

## 2023-03-20 PROCEDURE — 3008F PR BODY MASS INDEX (BMI) DOCUMENTED: ICD-10-PCS | Mod: CPTII,S$GLB,, | Performed by: INTERNAL MEDICINE

## 2023-03-20 PROCEDURE — 1160F RVW MEDS BY RX/DR IN RCRD: CPT | Mod: CPTII,S$GLB,, | Performed by: INTERNAL MEDICINE

## 2023-03-20 PROCEDURE — 3288F PR FALLS RISK ASSESSMENT DOCUMENTED: ICD-10-PCS | Mod: CPTII,S$GLB,, | Performed by: INTERNAL MEDICINE

## 2023-03-20 PROCEDURE — 99214 PR OFFICE/OUTPT VISIT, EST, LEVL IV, 30-39 MIN: ICD-10-PCS | Mod: S$GLB,,, | Performed by: INTERNAL MEDICINE

## 2023-03-20 PROCEDURE — 3051F HG A1C>EQUAL 7.0%<8.0%: CPT | Mod: CPTII,S$GLB,, | Performed by: INTERNAL MEDICINE

## 2023-03-20 PROCEDURE — 1160F PR REVIEW ALL MEDS BY PRESCRIBER/CLIN PHARMACIST DOCUMENTED: ICD-10-PCS | Mod: CPTII,S$GLB,, | Performed by: INTERNAL MEDICINE

## 2023-03-20 PROCEDURE — 99999 PR PBB SHADOW E&M-EST. PATIENT-LVL V: ICD-10-PCS | Mod: PBBFAC,,, | Performed by: INTERNAL MEDICINE

## 2023-03-20 PROCEDURE — 1159F PR MEDICATION LIST DOCUMENTED IN MEDICAL RECORD: ICD-10-PCS | Mod: CPTII,S$GLB,, | Performed by: INTERNAL MEDICINE

## 2023-03-20 PROCEDURE — 3051F PR MOST RECENT HEMOGLOBIN A1C LEVEL 7.0 - < 8.0%: ICD-10-PCS | Mod: CPTII,S$GLB,, | Performed by: INTERNAL MEDICINE

## 2023-03-20 PROCEDURE — 3078F PR MOST RECENT DIASTOLIC BLOOD PRESSURE < 80 MM HG: ICD-10-PCS | Mod: CPTII,S$GLB,, | Performed by: INTERNAL MEDICINE

## 2023-03-20 PROCEDURE — 3075F PR MOST RECENT SYSTOLIC BLOOD PRESS GE 130-139MM HG: ICD-10-PCS | Mod: CPTII,S$GLB,, | Performed by: INTERNAL MEDICINE

## 2023-03-20 PROCEDURE — 3008F BODY MASS INDEX DOCD: CPT | Mod: CPTII,S$GLB,, | Performed by: INTERNAL MEDICINE

## 2023-03-20 PROCEDURE — 1101F PR PT FALLS ASSESS DOC 0-1 FALLS W/OUT INJ PAST YR: ICD-10-PCS | Mod: CPTII,S$GLB,, | Performed by: INTERNAL MEDICINE

## 2023-03-20 NOTE — PROGRESS NOTES
CARDIOVASCULAR PROGRESS NOTE    REASON FOR CONSULT:   Driss Hernandez Jr. is a 72 y.o. male who presents for follow up of CAD, HFpEF, Chr AF.    PCP: Ehrensing  Ortho: [facility]  HISTORY OF PRESENT ILLNESS:   The patient returns for follow-up.  In the interim since his last office visit, he underwent testing as outlined below.  He tells me he had a single episode of shortness of breath at rest but no dyspnea on exertion.  He denied any angina or limiting dyspnea during his treadmill stress test.  He otherwise has had no palpitations or syncope.  There has been no PND, orthopnea, melena, hematuria, or claudicant symptoms.  He continues take his Plavix and Eliquis without any issues.      I reviewed the results of his cardiac testing as outlined below.  The nuclear stress test was negative at a good workload.  His Holter did not reveal any evidence of high-degree AV block.  He would no evidence of chronotropic incompetence during his exercise stress test.  The carotid ultrasound did reveal progressive carotid stenosis.    CARDIOVASCULAR HISTORY:   CAD 2016 CABG x3 (LIMA-LAD, SVG-D, SVG-PDA)   11/11/20 cath with diffuse native disease, patent SVG x2, LIMA-LAD atretic   12/10/20: PCI OM2 2.25x26 [facility] [place]    HFpEF    PAD    Chr AF on eliquis 5mg bid    PAST MEDICAL HISTORY:     Past Medical History:   Diagnosis Date    Chronic heart failure with preserved ejection fraction 2/9/2023    Chronic midline low back pain without sciatica 10/2/2017    Colon polyps     Coronary artery disease involving native coronary artery of native heart 3/5/2020    Coronary artery disease involving native coronary artery of native heart with angina pectoris 12/10/2020    Diabetes mellitus with neurological manifestations, uncontrolled 1/24/2017    Diabetic polyneuropathy associated with type 2 diabetes mellitus 1/24/2017    Diabetic polyneuropathy associated with type 2 diabetes mellitus     Essential hypertension 1/24/2017     Gastroesophageal reflux disease 1/24/2017    Hyperlipidemia 1/24/2017    Insomnia 1/24/2017    Long-term insulin use 1/24/2017    Longstanding persistent atrial fibrillation 12/30/2020    Lumbar spondylosis 11/13/2017    Nuclear sclerosis of both eyes 8/24/2017    Obesity     PAD (peripheral artery disease) 3/23/2022    Stage 3 chronic kidney disease 2/13/2019    Uncontrolled type 2 diabetes mellitus without complication, with long-term current use of insulin 1/24/2017       PAST SURGICAL HISTORY:     Past Surgical History:   Procedure Laterality Date    ARTHROSCOPIC REPAIR OF ROTATOR CUFF OF SHOULDER Right 7/5/2022    Procedure: REPAIR, ROTATOR CUFF, ARTHROSCOPIC;  Surgeon: Valerie Olivera MD;  Location: [facility] OR;  Service: Orthopedics;  Laterality: Right;  GUADALUPE AND NEPHJENNIFER LEMUS [phone]/ TEXTED ALLAN ON 6/23/2022 @ 9:47AM. ALLAN RESPONDED ON 6/23/2022 @ 10:33AM-  JEAN PAUL BABIN [phone] MY BE HERE FOR CASE SPOKE TO HIM @ 9:59AM ON 7-1-2022  RN PREOP 6/28/2022    BACK SURGERY      2002    CATARACT EXTRACTION W/  INTRAOCULAR LENS IMPLANT Right 08/29/2017    Dr. Hong    CATARACT EXTRACTION W/  INTRAOCULAR LENS IMPLANT Left 02/24/2022    Dr. Hong    COLONOSCOPY N/A 2/21/2017    Procedure: COLONOSCOPY;  Surgeon: Robb Rosado MD;  Location: [facility] ENDO;  Service: Endoscopy;  Laterality: N/A;    CORONARY ANGIOGRAPHY INCLUDING BYPASS GRAFTS WITH CATHETERIZATION OF LEFT HEART N/A 11/11/2020    Procedure: ANGIOGRAM, CORONARY, INCLUDING BYPASS GRAFT, WITH LEFT HEART CATHETERIZATION;  Surgeon: Lane Cuellar MD;  Location: [facility] CATH LAB;  Service: Cardiology;  Laterality: N/A;  RN PRE OP 11-5-2020. --COVID NEGATIVE ON  11-. C A    CORONARY ARTERY BYPASS GRAFT      March 2016    EPIDURAL STEROID INJECTION Bilateral 11/14/2018    Procedure: Lumbar Medial Branch Blocks;  Surgeon: Eze Byrd Jr., MD;  Location: [facility] ENDO;  Service: Pain Management;  Laterality: Bilateral;  Bilateral  Lumbar Medial Branch Blocks L2, L3, L4, L5    [id number]  [id number]    Arrive @ 1150; NO Sedation    EPIDURAL STEROID INJECTION Bilateral 11/28/2018    Procedure: Injection, Steroid, Epidural;  Surgeon: Eze Byrd Jr., MD;  Location: [facility] ENDO;  Service: Pain Management;  Laterality: Bilateral;  Bilateral Sacroiliac Joint Steroid Injections    02943    Arrive @ 0910    EPIDURAL STEROID INJECTION Bilateral 3/20/2019    Procedure: Injection, Steroid, Sacroiliiac Joint;  Surgeon: Eze Byrd Jr., MD;  Location: [facility] ENDO;  Service: Pain Management;  Laterality: Bilateral;  Bilateral Sacroiliac Joint Steroid Injections    [id number]    Arrive @ 1145; Trigger point injections also?    INTRAOCULAR PROSTHESES INSERTION Left 2/24/2022    Procedure: INSERTION, IOL PROSTHESIS;  Surgeon: Nickolas Hong MD;  Location: [facility] OR;  Service: Ophthalmology;  Laterality: Left;    PHACOEMULSIFICATION OF CATARACT Left 2/24/2022    Procedure: PHACOEMULSIFICATION, CATARACT;  Surgeon: Nickolas Hong MD;  Location: [facility] OR;  Service: Ophthalmology;  Laterality: Left;  RN Phone Pre Op 2-16-22.  Covid NEGATIVE  2-23-22.  Arrival 08:00 am.    TONSILLECTOMY         ALLERGIES AND MEDICATION:     Review of patient's allergies indicates:   Allergen Reactions    Penicillins Other (See Comments)     Unknown reaction, Had a reaction as a child    Shrimp Itching     Hand itching        Medication List            Accurate as of March 20, 2023  9:49 AM. If you have any questions, ask your nurse or doctor.                CONTINUE taking these medications      ACCU-CHEK MOIZ CONTROL SOLN Soln  Generic drug: blood glucose control high,low     ACCU-CHEK MOIZ PLUS TEST STRP Strp  Generic drug: blood sugar diagnostic  TEST THREE TIMES DAILY     ACCU-CHEK SOFTCLIX LANCETS Misc  Generic drug: lancets     albuterol 90 mcg/actuation inhaler  Commonly known as: PROVENTIL/VENTOLIN HFA  Inhale 2 puffs into the lungs every 4 (four) hours as  "needed for Shortness of Breath. Rescue     ascorbic acid (vitamin C) 100 MG tablet  Commonly known as: VITAMIN C     atorvastatin 80 MG tablet  Commonly known as: LIPITOR  Take 1 tablet (80 mg total) by mouth every evening.     b complex vitamins tablet     BD ALCOHOL SWABS Padm  Generic drug: alcohol swabs     BD ULTRA-FINE ROLAND PEN NEEDLE 32 gauge x 5/32" Ndle  Generic drug: pen needle, diabetic     celecoxib 100 MG capsule  Commonly known as: CeleBREX  TAKE 1 CAPSULE(100 MG) BY MOUTH EVERY DAY     clopidogreL 75 mg tablet  Commonly known as: PLAVIX  TAKE 1 TABLET(75 MG) BY MOUTH EVERY DAY     coenzyme Q10 100 mg capsule     dapagliflozin 5 mg Tab tablet  Commonly known as: FARXIGA  Take 1 tablet (5 mg total) by mouth once daily.     ELIQUIS 5 mg Tab  Generic drug: apixaban  TAKE 1 TABLET TWICE DAILY     ergocalciferol 50,000 unit Cap  Commonly known as: VITAMIN D2  TAKE ONE CAPSULE BY MOUTH WEEKLY     fenofibrate 160 MG Tab  TAKE 1 TABLET EVERY MORNING     furosemide 20 MG tablet  Commonly known as: LASIX  Take 1 tablet (20 mg total) by mouth 2 (two) times daily.     gabapentin 100 MG capsule  Commonly known as: NEURONTIN  TAKE 2 CAPSULES EVERY MORNING AND TAKE 3 CAPSULES EVERY DAY WITH DINNER     glimepiride 2 MG tablet  Commonly known as: AMARYL  TAKE 1 TABLET EVERY DAY BEFORE BREAKFAST     hydrALAZINE 50 MG tablet  Commonly known as: APRESOLINE  Take 1 tablet (50 mg total) by mouth 2 (two) times a day.     HYDROcodone-acetaminophen 5-325 mg per tablet  Commonly known as: NORCO  Take 1 tablet by mouth every 8 (eight) hours as needed for Pain.     insulin degludec 100 unit/mL (3 mL) insulin pen  Commonly known as: TRESIBA FLEXTOUCH U-100     isosorbide mononitrate 60 MG 24 hr tablet  Commonly known as: IMDUR  Take 1 tablet (60 mg total) by mouth once daily.     magnesium 250 mg Tab     metFORMIN 500 MG ER 24hr tablet  Commonly known as: GLUCOPHAGE-XR  Take 1 tablet with breakfast and 2 tablets with supper.   "   nitroGLYCERIN 0.4 MG SL tablet  Commonly known as: NITROSTAT  Place 1 tablet (0.4 mg total) under the tongue every 5 (five) minutes as needed for Chest pain.     omega-3 acid ethyl esters 1 gram capsule  Commonly known as: LOVAZA  Take 2 capsules (2 g total) by mouth 2 (two) times daily.     ondansetron 4 MG Tbdl  Commonly known as: ZOFRAN-ODT  Dissolve 1 tablet (4 mg total) by mouth every 6 (six) hours as needed (nausea).     pantoprazole 40 MG tablet  Commonly known as: PROTONIX  TAKE 1 TABLET EVERY DAY     semaglutide 2 mg/dose (8 mg/3 mL) Pnij  Commonly known as: OZEMPIC  Inject 2 mg into the skin every 7 days.     tiZANidine 4 MG tablet  Commonly known as: ZANAFLEX     triazolam 0.25 MG Tab  Commonly known as: HALCION  TAKE 1 TABLET BY MOUTH EVERY NIGHT AT BEDTIME AS NEEDED              SOCIAL HISTORY:     Social History     Socioeconomic History    Marital status:    Tobacco Use    Smoking status: Never    Smokeless tobacco: Never   Substance and Sexual Activity    Alcohol use: Yes     Comment: rare occassion    Drug use: No    Sexual activity: Yes     Partners: Female     Social Determinants of Health     Financial Resource Strain: Low Risk     Difficulty of Paying Living Expenses: Not hard at all   Food Insecurity: No Food Insecurity    Worried About Running Out of Food in the Last Year: Never true    Ran Out of Food in the Last Year: Never true   Transportation Needs: No Transportation Needs    Lack of Transportation (Medical): No    Lack of Transportation (Non-Medical): No   Physical Activity: Insufficiently Active    Days of Exercise per Week: 3 days    Minutes of Exercise per Session: 30 min   Stress: No Stress Concern Present    Feeling of Stress : Only a little   Social Connections: Moderately Integrated    Frequency of Communication with Friends and Family: More than three times a week    Frequency of Social Gatherings with Friends and Family: Three times a week    Attends Congregation Services:  Never    Active Member of Clubs or Organizations: Yes    Attends Club or Organization Meetings: More than 4 times per year    Marital Status:    Housing Stability: Low Risk     Unable to Pay for Housing in the Last Year: No    Number of Places Lived in the Last Year: 1    Unstable Housing in the Last Year: No       FAMILY HISTORY:     Family History   Problem Relation Age of Onset    Depression Mother     Heart disease Mother     Stroke Father     No Known Problems Sister     Diabetes Sister     No Known Problems Sister     Blindness Brother     No Known Problems Maternal Aunt     No Known Problems Maternal Uncle     No Known Problems Paternal Aunt     No Known Problems Paternal Uncle     No Known Problems Maternal Grandmother     No Known Problems Maternal Grandfather     No Known Problems Paternal Grandmother     No Known Problems Paternal Grandfather     Amblyopia Neg Hx     Cancer Neg Hx     Cataracts Neg Hx     Glaucoma Neg Hx     Hypertension Neg Hx     Macular degeneration Neg Hx     Retinal detachment Neg Hx     Strabismus Neg Hx     Thyroid disease Neg Hx        REVIEW OF SYSTEMS:   Review of Systems   Constitutional:  Negative for chills, diaphoresis and fever.   HENT:  Negative for nosebleeds.    Eyes:  Negative for blurred vision, double vision and photophobia.   Respiratory:  Negative for cough, hemoptysis, shortness of breath and wheezing.    Cardiovascular:  Negative for chest pain, palpitations, orthopnea, claudication, leg swelling and PND.   Gastrointestinal:  Negative for abdominal pain, blood in stool, heartburn, melena, nausea and vomiting.   Genitourinary:  Negative for flank pain and hematuria.   Musculoskeletal:  Negative for falls, joint pain, myalgias and neck pain.   Skin:  Negative for rash.   Neurological:  Negative for dizziness, seizures, loss of consciousness, weakness and headaches.   Endo/Heme/Allergies:  Negative for polydipsia. Does not bruise/bleed easily.  "  Psychiatric/Behavioral:  Negative for depression and memory loss. The patient is not nervous/anxious.      PHYSICAL EXAM:     Vitals:    03/20/23 0943   BP: 136/72   Pulse: 61   Resp: 18    Body mass index is 28.45 kg/m².  Weight: 82.4 kg (181 lb 10.5 oz)   Height: 5' 7" (170.2 cm)     Physical Exam  Vitals reviewed.   Constitutional:       General: He is not in acute distress.     Appearance: Normal appearance. He is well-developed. He is not ill-appearing, toxic-appearing or diaphoretic.   HENT:      Head: Normocephalic and atraumatic.   Eyes:      General: No scleral icterus.     Extraocular Movements: Extraocular movements intact.      Conjunctiva/sclera: Conjunctivae normal.      Pupils: Pupils are equal, round, and reactive to light.   Neck:      Thyroid: No thyromegaly.      Vascular: Normal carotid pulses. No carotid bruit or JVD.      Trachea: Trachea normal.   Cardiovascular:      Rate and Rhythm: Bradycardia present. Rhythm irregularly irregular.      Heart sounds: S1 normal and S2 normal. Heart sounds are distant. No murmur heard.    No friction rub. No gallop.   Pulmonary:      Effort: Pulmonary effort is normal. No respiratory distress.      Breath sounds: Normal breath sounds. No stridor. No wheezing, rhonchi or rales.   Chest:      Chest wall: No tenderness.   Abdominal:      General: There is no distension.      Palpations: Abdomen is soft.   Musculoskeletal:         General: No swelling or tenderness.      Cervical back: Normal range of motion and neck supple. No edema or rigidity.      Right lower leg: No edema.      Left lower leg: No edema.      Comments: R shoulder limited ROM   Feet:      Right foot:      Skin integrity: No ulcer.      Left foot:      Skin integrity: No ulcer.   Skin:     General: Skin is warm and dry.      Coloration: Skin is not jaundiced.   Neurological:      General: No focal deficit present.      Mental Status: He is alert and oriented to person, place, and time.      " Cranial Nerves: No cranial nerve deficit.   Psychiatric:         Mood and Affect: Mood normal.         Speech: Speech normal.         Behavior: Behavior normal. Behavior is cooperative.       DATA:   EKG: (personally reviewed tracing(s))  3/8/23 AF 54    Laboratory:  CBC:  Recent Labs   Lab 12/07/20  1200 03/30/22  0908 06/09/22  0826   WBC 5.82 4.72 5.56   Hemoglobin 10.6 L 9.5 L 10.8 L   Hematocrit 33.8 L 32.0 L 36.7 L   Platelets 231 188 319         CHEMISTRIES:  Recent Labs   Lab 04/28/22  0923 05/11/22  0722 06/09/22  0826 08/11/22  0931 09/13/22  0658 10/05/22  1116 11/11/22  0729   Glucose 146 H  --  126 H  --  99 267 H 196 H   Sodium 141  --  143  --  140 136 139   Potassium 4.9  --  4.9  --  4.5 5.7 H 4.9   BUN 30 H  --  33 H  --  26 H 52 H 25 H   Creatinine 1.5 H 1.5 H 1.5 H   < > 1.5 H 1.8 H 1.4   eGFR if  53 A 53.0 A 53.0 A  --   --   --   --    eGFR if non  46 A 45.9 A 45.9 A  --   --   --   --    Calcium 9.7  --  10.2  --  9.9 10.6 H 9.5    < > = values in this interval not displayed.         CARDIAC BIOMARKERS:        COAGS:  Recent Labs   Lab 04/01/21  0033 04/21/21  0921 05/10/21  0945   INR 2.4 H 2.0 H 2.9 H         LIPIDS/LFTS:  Recent Labs   Lab 08/18/20  0928 02/10/22  1014 05/11/22  0722 06/09/22  0826 09/13/22  0658   Cholesterol 137 163 123  --  133   Triglycerides 125 123 116  --  144   HDL 25 L 27 L 22 L  --  25 L   LDL Cholesterol 87.0 111.4 77.8  --  79.2   Non-HDL Cholesterol 112 136 101  --  108   AST 18  --   --  19 18   ALT 18  --   --  13 12         Cardiovascular Testing:  Holter 3/13/23  Atrial fibrillation  Heart rates varied between 32 and 99 BPM with an average of 58 BPM.  There were frequent PVCs.  Diary entries correlate with heart rates between 45-61bpm.    Ex MPI 3/13/23 (images personally reviewed and interpreted)    The patient exercised for 8 minutes 16 seconds on a Marcial protocol, corresponding to a functional capacity of 9 METS, achieving  a peak heart rate of 127 bpm, which is 86 % of the age predicted maximum heart rate. Their exercise capacity was above average.    Normal myocardial perfusion scan. There is no evidence of myocardial ischemia or infarction.    There is a mild to moderate intensity perfusion abnormality in the anteroseptal wall of the left ventricle consistent with the RV insertion site.    The gated perfusion images showed an ejection fraction of 66% post stress.    There is normal wall motion post stress.    The ECG portion of the study is negative for ischemia.    The patient reported no chest pain during the stress test.    During stress, rare PVCs are noted.    The exercise capacity was above average.    Carotid US 3/13/23  There is 40-49% right Internal Carotid Stenosis.  There is 70-79% left Internal Carotid Stenosis.  >50% bilat ECA stenosis.  Compared to prior report dated 09/13/2022, right ICA stenosis has progressed.    LE venous US 4/20/22  There is no evidence of a right lower extremity DVT.  There is no evidence of a left lower extremity DVT.  1.2x0.6cm non vascular structure noted in left proximal calf.    Echo 3/30/22  The left ventricle is normal in size with mild concentric hypertrophy and normal systolic function.  The estimated ejection fraction is 60%.  Mild right ventricular enlargement with low normal right ventricular systolic function.  Mild mitral regurgitation.  Mild tricuspid regurgitation.  The estimated PA systolic pressure is 22 mmHg.  Atrial fibrillation observed.    PCI OM1 12/10/20  1.  90% mid OM2 stenosis.  2.  Successful PCI with placement of a 2.25 x 26 mm drug-eluting stent reducing the 90% stenosis to 0%.  INGRIS 3 flow.  No dissection.  Good angiographic results.    Cath 11/11/20   Left Main   The vessel is angiographically normal.   Left Anterior Descending   Subtotal mid occlusion. Diffusely diseased distal vessel   Left Circumflex   Long mid 80% between OM1 and OM2   First Obtuse Marginal  Branch   90% mid   Second Obtuse Marginal Branch   80% mid   Right Coronary Artery   80% mid - moderate disffuse distal disease   LIMA Graft To Mid LAD   Mid to distal graft diffusely diseased 0 multile 80-90% lesions. Small diffusely diseased distal LAD   Saphenous Graft To RPDA   Patent with good runoff to PDA   Graft To 1st Diag   Patent to D1 with good runoff     Ex NUSRAT 4/18/18  Resting NUSRAT:   The right ankle brachial index was 1.04 which is normal.   The left ankle brachial index was 1.08 which is normal.   The right TBI is 0.55.   The left TBI is 0.98.   Exercise NUSRAT:   Post exercise right ankle pressure was 113 mmHg, resulting in a right post-exercise NUSRAT of 0.67.   Post exercise left ankle pressure was 150 mmHg, resulting in a left post-exercise NUSRAT of 0.89.       ASSESSMENT:   # CAD s/p CABG/PCI OM 12/2020, asymtomatic.  MPI 3/2023 neg.  # HTN, controlled  # HFpEF, euvolemic  # CKD3a, elev creat/K+ with losartan, normalized when stopped losartan.  # Chr AF on eliquis 5mg bid, HR controlled (?too slow).  Holter 3/2023 OK, no evidence of chronotropic incompetence on Ex MPI 3/2023.  # HLP on atorva 80mg  # PAD, abnl NUSRAT 4/18/18  # carotid atherosclerosis (CUS 3/2023)  # L>R LE edema, resolved.  LE venous US 4/2022 neg.  # aortic atherosclerosis (CT Chest 10/29/18)    PLAN:   Cont med rx  Cont Plavix 75mg qd  Cont eliquis 5mg bid  RTC 6 months with surveillance Carotid US (Sept 2023)    Eze Purdy MD, FACC

## 2023-03-22 ENCOUNTER — PATIENT MESSAGE (OUTPATIENT)
Dept: PHARMACY | Facility: CLINIC | Age: 73
End: 2023-03-22
Payer: MEDICARE

## 2023-03-22 NOTE — TELEPHONE ENCOUNTER
I have informed Driss Hernandez Jr. he has been approved in the [employer] Patient Assistance Program through 11/30/23. A 120 day supply of Ozempic, Pen Needles, and Tresiba will be shipped to [facility] in 6 weeks. Patient has 3 refills remaining and enrolled in auto-refills.  Updated proof of eligibility is required to re-enroll for 2024 calendar year.

## 2023-03-25 DIAGNOSIS — E78.49 OTHER HYPERLIPIDEMIA: ICD-10-CM

## 2023-03-26 RX ORDER — FENOFIBRATE 160 MG/1
TABLET ORAL
Qty: 90 TABLET | Refills: 1 | Status: SHIPPED | OUTPATIENT
Start: 2023-03-26 | End: 2023-12-19

## 2023-03-26 NOTE — TELEPHONE ENCOUNTER
Care Due:                  Date            Visit Type   Department     Provider  --------------------------------------------------------------------------------                                [facility]                              FOLLOWUP/OF  MED/ INTERNAL  Jono Samuel  Last Visit: 11-      FICE VISIT   MED/ PEDS      Ehrensing  Next Visit: None Scheduled  None         None Found                                                            Last  Test          Frequency    Reason                     Performed    Due Date  --------------------------------------------------------------------------------    CBC.........  12 months..  fenofibrate..............  06- 06-    [employer] Embedded Care Gaps. Reference number: [id number]. 3/26/2023   11:25:23 AM CDT

## 2023-03-26 NOTE — TELEPHONE ENCOUNTER
Refill Decision Note   Driss Hernandez  is requesting a refill authorization.  Brief Assessment and Rationale for Refill:  Approve     Medication Therapy Plan:       Medication Reconciliation Completed: No   Comments:     Provider Staff:     Action is required for this patient.   Please see care gap opportunities below in Care Due Message.     Thanks!  [facility]     Appointments      Date Provider   Last Visit   11/29/2022 Jono Willoughby MD   Next Visit   Visit date not found Jono Willoughby MD     Note composed:11:29 AM 03/26/2023           Note composed:11:29 AM 03/26/2023

## 2023-03-28 ENCOUNTER — TELEPHONE (OUTPATIENT)
Dept: ENDOCRINOLOGY | Facility: CLINIC | Age: 73
End: 2023-03-28
Payer: MEDICARE

## 2023-03-28 RX ORDER — INSULIN DEGLUDEC 100 U/ML
20 INJECTION, SOLUTION SUBCUTANEOUS DAILY
Qty: 10 PEN | Refills: 4 | Status: SHIPPED | OUTPATIENT
Start: 2023-03-28 | End: 2023-07-07 | Stop reason: SDUPTHER

## 2023-03-28 NOTE — TELEPHONE ENCOUNTER
----- Message from Mita Hernandez PharmD sent at 3/28/2023 11:54 AM CDT -----  Regarding: Insulin Degludec Flextouch U-100  [facility] has received medication from [facility] patient assistance program for your patient FREDY ROLLE JR (MRN [id number]).  At your earliest convenience please send to [facility] escript for INSULIN DEGLUDEC FLEXTOUCH U-100 (qty 2 boxes or 10 pens).  Thanks

## 2023-04-12 ENCOUNTER — TELEPHONE (OUTPATIENT)
Dept: ENDOCRINOLOGY | Facility: CLINIC | Age: 73
End: 2023-04-12
Payer: MEDICARE

## 2023-04-12 DIAGNOSIS — E11.65 TYPE 2 DIABETES MELLITUS WITH HYPERGLYCEMIA, WITH LONG-TERM CURRENT USE OF INSULIN: ICD-10-CM

## 2023-04-12 DIAGNOSIS — Z79.4 TYPE 2 DIABETES MELLITUS WITH HYPERGLYCEMIA, WITH LONG-TERM CURRENT USE OF INSULIN: ICD-10-CM

## 2023-04-12 DIAGNOSIS — I25.10 CORONARY ARTERY DISEASE INVOLVING NATIVE CORONARY ARTERY OF NATIVE HEART WITHOUT ANGINA PECTORIS: ICD-10-CM

## 2023-04-12 NOTE — TELEPHONE ENCOUNTER
----- Message from Mita Hernandez PharmD sent at 4/12/2023  2:13 PM CDT -----  Regarding: Ozempic 2mg manufacture assistance program  [facility] has received medication from [employer] patient assistance program for your patient FREDY ROLLE JR (MRN [id number]).  At your earliest convenience please send to [facility] escript for OZEMPIC 2MG ( qty 4 pens).  Thanks

## 2023-04-14 DIAGNOSIS — Z79.4 TYPE 2 DIABETES MELLITUS WITH HYPERGLYCEMIA, WITH LONG-TERM CURRENT USE OF INSULIN: ICD-10-CM

## 2023-04-14 DIAGNOSIS — E11.65 TYPE 2 DIABETES MELLITUS WITH HYPERGLYCEMIA, WITH LONG-TERM CURRENT USE OF INSULIN: ICD-10-CM

## 2023-04-14 DIAGNOSIS — I25.10 CORONARY ARTERY DISEASE INVOLVING NATIVE CORONARY ARTERY OF NATIVE HEART WITHOUT ANGINA PECTORIS: ICD-10-CM

## 2023-04-21 ENCOUNTER — OFFICE VISIT (OUTPATIENT)
Dept: OPHTHALMOLOGY | Facility: CLINIC | Age: 73
End: 2023-04-21
Payer: MEDICARE

## 2023-04-21 DIAGNOSIS — E11.9 DM TYPE 2 WITHOUT RETINOPATHY: Primary | ICD-10-CM

## 2023-04-21 DIAGNOSIS — I10 ESSENTIAL HYPERTENSION: ICD-10-CM

## 2023-04-21 DIAGNOSIS — Z96.1 PSEUDOPHAKIA: ICD-10-CM

## 2023-04-21 DIAGNOSIS — H52.7 REFRACTIVE ERROR: ICD-10-CM

## 2023-04-21 PROCEDURE — 1159F MED LIST DOCD IN RCRD: CPT | Mod: CPTII,S$GLB,, | Performed by: OPHTHALMOLOGY

## 2023-04-21 PROCEDURE — 2023F DILAT RTA XM W/O RTNOPTHY: CPT | Mod: CPTII,S$GLB,, | Performed by: OPHTHALMOLOGY

## 2023-04-21 PROCEDURE — 3051F PR MOST RECENT HEMOGLOBIN A1C LEVEL 7.0 - < 8.0%: ICD-10-PCS | Mod: CPTII,S$GLB,, | Performed by: OPHTHALMOLOGY

## 2023-04-21 PROCEDURE — 1160F RVW MEDS BY RX/DR IN RCRD: CPT | Mod: CPTII,S$GLB,, | Performed by: OPHTHALMOLOGY

## 2023-04-21 PROCEDURE — 99999 PR PBB SHADOW E&M-EST. PATIENT-LVL IV: ICD-10-PCS | Mod: PBBFAC,,, | Performed by: OPHTHALMOLOGY

## 2023-04-21 PROCEDURE — 3288F FALL RISK ASSESSMENT DOCD: CPT | Mod: CPTII,S$GLB,, | Performed by: OPHTHALMOLOGY

## 2023-04-21 PROCEDURE — 3051F HG A1C>EQUAL 7.0%<8.0%: CPT | Mod: CPTII,S$GLB,, | Performed by: OPHTHALMOLOGY

## 2023-04-21 PROCEDURE — 3288F PR FALLS RISK ASSESSMENT DOCUMENTED: ICD-10-PCS | Mod: CPTII,S$GLB,, | Performed by: OPHTHALMOLOGY

## 2023-04-21 PROCEDURE — 1126F PR PAIN SEVERITY QUANTIFIED, NO PAIN PRESENT: ICD-10-PCS | Mod: CPTII,S$GLB,, | Performed by: OPHTHALMOLOGY

## 2023-04-21 PROCEDURE — 1101F PR PT FALLS ASSESS DOC 0-1 FALLS W/OUT INJ PAST YR: ICD-10-PCS | Mod: CPTII,S$GLB,, | Performed by: OPHTHALMOLOGY

## 2023-04-21 PROCEDURE — 99999 PR PBB SHADOW E&M-EST. PATIENT-LVL IV: CPT | Mod: PBBFAC,,, | Performed by: OPHTHALMOLOGY

## 2023-04-21 PROCEDURE — 2023F PR DILATED RETINAL EXAM W/O EVID OF RETINOPATHY: ICD-10-PCS | Mod: CPTII,S$GLB,, | Performed by: OPHTHALMOLOGY

## 2023-04-21 PROCEDURE — 1160F PR REVIEW ALL MEDS BY PRESCRIBER/CLIN PHARMACIST DOCUMENTED: ICD-10-PCS | Mod: CPTII,S$GLB,, | Performed by: OPHTHALMOLOGY

## 2023-04-21 PROCEDURE — 1159F PR MEDICATION LIST DOCUMENTED IN MEDICAL RECORD: ICD-10-PCS | Mod: CPTII,S$GLB,, | Performed by: OPHTHALMOLOGY

## 2023-04-21 PROCEDURE — 1126F AMNT PAIN NOTED NONE PRSNT: CPT | Mod: CPTII,S$GLB,, | Performed by: OPHTHALMOLOGY

## 2023-04-21 PROCEDURE — 1101F PT FALLS ASSESS-DOCD LE1/YR: CPT | Mod: CPTII,S$GLB,, | Performed by: OPHTHALMOLOGY

## 2023-04-21 PROCEDURE — 92014 PR EYE EXAM, EST PATIENT,COMPREHESV: ICD-10-PCS | Mod: S$GLB,,, | Performed by: OPHTHALMOLOGY

## 2023-04-21 PROCEDURE — 92014 COMPRE OPH EXAM EST PT 1/>: CPT | Mod: S$GLB,,, | Performed by: OPHTHALMOLOGY

## 2023-04-22 NOTE — PROGRESS NOTES
Subjective:       Patient ID: Driss Hernandez Jr. is a 72 y.o. male.    Chief Complaint: Concerns About Ocular Health    HPI    Dls: 3/2/22 Dr. Hong    73 y/o male presents today for  over due 6 month CE OS.   Pt c/o blurry vision at distance and near. Pt rarely wears otc readers.     No pain  No floaters  No flashes    Eye meds  None    PoHX:  S/p CE od 2017  S/p CE os 2022     Last edited by Nupur Castanon MA on 4/21/2023 10:24 AM.             Assessment:       1. DM type 2 without retinopathy    2. Essential hypertension    3. Refractive error    4. Pseudophakia        Plan:       DM-No NPDR OU.  HTN-No retinopathy OU.  RE-Wears readers prn.      Control DM & HTN.  RTC Dr Orantes in 1 yr.

## 2023-05-09 RX ORDER — ATORVASTATIN CALCIUM 80 MG/1
TABLET, FILM COATED ORAL
Qty: 90 TABLET | Refills: 3 | Status: SHIPPED | OUTPATIENT
Start: 2023-05-09 | End: 2023-05-10 | Stop reason: SDUPTHER

## 2023-05-10 RX ORDER — ATORVASTATIN CALCIUM 80 MG/1
80 TABLET, FILM COATED ORAL NIGHTLY
Qty: 90 TABLET | Refills: 3 | Status: SHIPPED | OUTPATIENT
Start: 2023-05-10

## 2023-05-10 NOTE — TELEPHONE ENCOUNTER
----- Message from Bubba Espinoza sent at 5/10/2023  9:17 AM CDT -----  Regarding: Self  [phone]  Type: RX Refill Request    Who Called:  Self     Have you contacted your pharmacy: yes     Refill    RX Name and Strength: atorvastatin (LIPITOR) 80 MG tablet    Preferred Pharmacy with phone number:   [facility] - [facility] [address]  [address] [address]  Phone: [phone] Fax: [phone]    Local or Mail Order: mail     Would the patient rather a call back or a response via [facility]? Call back     Best Call Back Number: [phone]     Additional Information:  medication was sent to the wrong pharmacy. Send to pharmacy listed in message     Thank you.

## 2023-05-22 ENCOUNTER — LAB VISIT (OUTPATIENT)
Dept: LAB | Facility: HOSPITAL | Age: 73
End: 2023-05-22
Attending: NURSE PRACTITIONER
Payer: MEDICARE

## 2023-05-22 DIAGNOSIS — E11.65 TYPE 2 DIABETES MELLITUS WITH HYPERGLYCEMIA, WITH LONG-TERM CURRENT USE OF INSULIN: ICD-10-CM

## 2023-05-22 DIAGNOSIS — Z79.4 TYPE 2 DIABETES MELLITUS WITH HYPERGLYCEMIA, WITH LONG-TERM CURRENT USE OF INSULIN: ICD-10-CM

## 2023-05-22 LAB
CREAT SERPL-MCNC: 1.5 MG/DL (ref 0.5–1.4)
EST. GFR  (NO RACE VARIABLE): 49 ML/MIN/1.73 M^2
ESTIMATED AVG GLUCOSE: 143 MG/DL (ref 68–131)
HBA1C MFR BLD: 6.6 % (ref 4–5.6)

## 2023-05-22 PROCEDURE — 83036 HEMOGLOBIN GLYCOSYLATED A1C: CPT | Performed by: NURSE PRACTITIONER

## 2023-05-22 PROCEDURE — 36415 COLL VENOUS BLD VENIPUNCTURE: CPT | Performed by: NURSE PRACTITIONER

## 2023-05-22 PROCEDURE — 82565 ASSAY OF CREATININE: CPT | Performed by: NURSE PRACTITIONER

## 2023-05-22 RX ORDER — FUROSEMIDE 20 MG/1
TABLET ORAL
Qty: 180 TABLET | Refills: 3 | Status: SHIPPED | OUTPATIENT
Start: 2023-05-22

## 2023-05-29 ENCOUNTER — OFFICE VISIT (OUTPATIENT)
Dept: ENDOCRINOLOGY | Facility: CLINIC | Age: 73
End: 2023-05-29
Payer: MEDICARE

## 2023-05-29 VITALS
SYSTOLIC BLOOD PRESSURE: 156 MMHG | TEMPERATURE: 98 F | BODY MASS INDEX: 28.51 KG/M2 | WEIGHT: 182 LBS | DIASTOLIC BLOOD PRESSURE: 71 MMHG | HEART RATE: 67 BPM

## 2023-05-29 DIAGNOSIS — I25.10 CORONARY ARTERY DISEASE INVOLVING NATIVE CORONARY ARTERY OF NATIVE HEART WITHOUT ANGINA PECTORIS: ICD-10-CM

## 2023-05-29 DIAGNOSIS — N18.31 STAGE 3A CHRONIC KIDNEY DISEASE: ICD-10-CM

## 2023-05-29 DIAGNOSIS — E11.8 DIABETES MELLITUS TYPE 2 WITH COMPLICATIONS: Primary | ICD-10-CM

## 2023-05-29 DIAGNOSIS — E78.5 HYPERLIPIDEMIA, UNSPECIFIED HYPERLIPIDEMIA TYPE: ICD-10-CM

## 2023-05-29 PROCEDURE — 3008F BODY MASS INDEX DOCD: CPT | Mod: CPTII,S$GLB,, | Performed by: NURSE PRACTITIONER

## 2023-05-29 PROCEDURE — 1159F MED LIST DOCD IN RCRD: CPT | Mod: CPTII,S$GLB,, | Performed by: NURSE PRACTITIONER

## 2023-05-29 PROCEDURE — 99999 PR PBB SHADOW E&M-EST. PATIENT-LVL V: CPT | Mod: PBBFAC,,, | Performed by: NURSE PRACTITIONER

## 2023-05-29 PROCEDURE — 3066F NEPHROPATHY DOC TX: CPT | Mod: CPTII,S$GLB,, | Performed by: NURSE PRACTITIONER

## 2023-05-29 PROCEDURE — 3061F PR NEG MICROALBUMINURIA RESULT DOCUMENTED/REVIEW: ICD-10-PCS | Mod: CPTII,S$GLB,, | Performed by: NURSE PRACTITIONER

## 2023-05-29 PROCEDURE — 1160F RVW MEDS BY RX/DR IN RCRD: CPT | Mod: CPTII,S$GLB,, | Performed by: NURSE PRACTITIONER

## 2023-05-29 PROCEDURE — 3061F NEG MICROALBUMINURIA REV: CPT | Mod: CPTII,S$GLB,, | Performed by: NURSE PRACTITIONER

## 2023-05-29 PROCEDURE — 3078F DIAST BP <80 MM HG: CPT | Mod: CPTII,S$GLB,, | Performed by: NURSE PRACTITIONER

## 2023-05-29 PROCEDURE — 3008F PR BODY MASS INDEX (BMI) DOCUMENTED: ICD-10-PCS | Mod: CPTII,S$GLB,, | Performed by: NURSE PRACTITIONER

## 2023-05-29 PROCEDURE — 3078F PR MOST RECENT DIASTOLIC BLOOD PRESSURE < 80 MM HG: ICD-10-PCS | Mod: CPTII,S$GLB,, | Performed by: NURSE PRACTITIONER

## 2023-05-29 PROCEDURE — 3077F PR MOST RECENT SYSTOLIC BLOOD PRESSURE >= 140 MM HG: ICD-10-PCS | Mod: CPTII,S$GLB,, | Performed by: NURSE PRACTITIONER

## 2023-05-29 PROCEDURE — 3044F PR MOST RECENT HEMOGLOBIN A1C LEVEL <7.0%: ICD-10-PCS | Mod: CPTII,S$GLB,, | Performed by: NURSE PRACTITIONER

## 2023-05-29 PROCEDURE — 1160F PR REVIEW ALL MEDS BY PRESCRIBER/CLIN PHARMACIST DOCUMENTED: ICD-10-PCS | Mod: CPTII,S$GLB,, | Performed by: NURSE PRACTITIONER

## 2023-05-29 PROCEDURE — 3077F SYST BP >= 140 MM HG: CPT | Mod: CPTII,S$GLB,, | Performed by: NURSE PRACTITIONER

## 2023-05-29 PROCEDURE — 3044F HG A1C LEVEL LT 7.0%: CPT | Mod: CPTII,S$GLB,, | Performed by: NURSE PRACTITIONER

## 2023-05-29 PROCEDURE — 99214 PR OFFICE/OUTPT VISIT, EST, LEVL IV, 30-39 MIN: ICD-10-PCS | Mod: S$GLB,,, | Performed by: NURSE PRACTITIONER

## 2023-05-29 PROCEDURE — 99214 OFFICE O/P EST MOD 30 MIN: CPT | Mod: S$GLB,,, | Performed by: NURSE PRACTITIONER

## 2023-05-29 PROCEDURE — 99999 PR PBB SHADOW E&M-EST. PATIENT-LVL V: ICD-10-PCS | Mod: PBBFAC,,, | Performed by: NURSE PRACTITIONER

## 2023-05-29 PROCEDURE — 3066F PR DOCUMENTATION OF TREATMENT FOR NEPHROPATHY: ICD-10-PCS | Mod: CPTII,S$GLB,, | Performed by: NURSE PRACTITIONER

## 2023-05-29 PROCEDURE — 1159F PR MEDICATION LIST DOCUMENTED IN MEDICAL RECORD: ICD-10-PCS | Mod: CPTII,S$GLB,, | Performed by: NURSE PRACTITIONER

## 2023-05-29 RX ORDER — LANCETS
EACH MISCELLANEOUS
Qty: 200 EACH | Refills: 3 | Status: SHIPPED | OUTPATIENT
Start: 2023-05-29

## 2023-05-29 RX ORDER — INSULIN PUMP SYRINGE, 3 ML
EACH MISCELLANEOUS
Qty: 1 EACH | Refills: 0 | Status: SHIPPED | OUTPATIENT
Start: 2023-05-29 | End: 2024-05-28

## 2023-05-29 NOTE — PROGRESS NOTES
CC: This 72 y.o. White male  is here for evaluation of  T2DM along with comorbidities indicated in the Visit Diagnosis section of this encounter.    HPI: Driss Hernandez Jr. was diagnosed with T2DM in ~ 2005.   Pt was under care of Dr. Agrawal and TARI Werner, [place], previously.   Medical history: CAD s/p CABG, atrial fibrillation, CHF, CKD      Prior visit 2/2023  A1c is a bit down from 7.4 to now 7.2%.   C/o urgent BM after meals so it's inconvenient when he dines out.   Plan Increase Ozempic to 2 mg once weekly. Reviewed potential side effects. If unable to tolerate 2 mg due to side effects, then ok to reduce back to 1 mg weekly which would equal 36 clicks on the 2 mg pen.   Reduce metformin from 4 tablets a day to 3 tablets a day, to reduce urgency of BMs. Take metformin 1 tablet with breakfast and 2 tablets with supper.   Continue Farxiga, glimepiride, Tresiba 20 units.   If blood sugars drop less than 80, then contact office. May need to reduce Tresiba and/or glimepiride.   Test glucose 2x/day. Keep log.   Referral placed for Pharmacy Assistance for help applying for Farxiga, Tresiba, and Ozempic 2 mg.   Return to clinic in 3 months with labs prior.     Interval hx  A1c is down from 7.2 to 6.6%.   He started Ozempic at 2 mg about 4-5 weeks ago. Doesn't appreciate any appetite suppression. Tolerating it well. However, he has been craving sweets more.   Doing better with reduced dose of metformin. Urgency greatly improved.       LAST DIABETES EDUCATION: never --     HOSPITALIZED FOR DIABETES  -  No.    DIABETES MEDICATION HX:   ozempic started 10/2020  Novolin R switched to glimepiride in Jan 2021     PRESCRIBED DIABETES MEDICATIONS:   Ozempic 2 mg once weekly  (obtains from Pharmacy Assistance)  Tresiba  20 units hs (obtains from Pharmacy Assistance)  metformin  mg AM and 1000 mg PM   glimepiride 2 mg once daily around 3 pm before snack   Farxiga 5 mg daily       Misses medication doses - No      DM  COMPLICATIONS: nephropathy, peripheral neuropathy, and cardiovascular disease    SELF MONITORING BLOOD GLUCOSE: Checks blood glucose at home 2-3x/day.   Has a hard time using small strips with his accuchek meter.             HYPOGLYCEMIC EPISODES: symptoms start < 70, infrequent.          CURRENT DIET: drinks water, 2% milk ~ 2 cup/day. Eats mostly just lunch and dinner. Lunch can be as late as 2 pm. Occasionally eats a protein bar in the AM.     drinks 2 cups coffee with milk with AS in AM     Dinner last night was 2 ears of corn and later had a big cookie.     CURRENT EXERCISE:  None     SOCIAL: his daughter is a palliative care NP      BP (!) 156/71   Pulse 67   Temp 97.9 °F (36.6 °C)   Wt 82.6 kg (182 lb)   BMI 28.51 kg/m²       ROS:   CONSTITUTIONAL: Appetite good, denies fatigue  Denies n/v, constipation, or diarrhea.   + cramping and numbness to feet      PHYSICAL EXAM:  GENERAL: Well developed, well nourished. No acute distress.   PSYCH: AAOx3, appropriate mood and affect, conversant, well-groomed. Judgement and insight good.   NEURO: Cranial nerves grossly intact. Speech clear, no tremor.   CHEST: Respirations even and unlabored.   Foot exam:     Protective Sensation (w/ 10 gram monofilament):  Right: Intact  Left: Intact    Visual Inspection:  Skin Breakdown -  Neither    Pedal Pulses:   Right: Present  Left: Present    Posterior Tibialis Pulses:   Right:Present  Left: Present        Hemoglobin A1C   Date Value Ref Range Status   05/22/2023 6.6 (H) 4.0 - 5.6 % Final     Comment:     ADA Screening Guidelines:  5.7-6.4%  Consistent with prediabetes  >or=6.5%  Consistent with diabetes    High levels of fetal hemoglobin interfere with the HbA1C  assay. Heterozygous hemoglobin variants (HbS, HgC, etc)do  not significantly interfere with this assay.   However, presence of multiple variants may affect accuracy.     02/02/2023 7.2 (H) 4.0 - 5.6 % Final     Comment:     ADA Screening Guidelines:  5.7-6.4%   Consistent with prediabetes  >or=6.5%  Consistent with diabetes    High levels of fetal hemoglobin interfere with the HbA1C  assay. Heterozygous hemoglobin variants (HbS, HgC, etc)do  not significantly interfere with this assay.   However, presence of multiple variants may affect accuracy.     11/11/2022 7.4 (H) 4.0 - 5.6 % Final     Comment:     ADA Screening Guidelines:  5.7-6.4%  Consistent with prediabetes  >or=6.5%  Consistent with diabetes    High levels of fetal hemoglobin interfere with the HbA1C  assay. Heterozygous hemoglobin variants (HbS, HgC, etc)do  not significantly interfere with this assay.   However, presence of multiple variants may affect accuracy.     08/29/2019 7.7 % Final     Comment:     Ania Werner           Chemistry        Component Value Date/Time     11/11/2022 0729    K 4.9 11/11/2022 0729     11/11/2022 0729    CO2 24 11/11/2022 0729    BUN 25 (H) 11/11/2022 0729    CREATININE 1.5 (H) 05/22/2023 0906     (H) 11/11/2022 0729        Component Value Date/Time    CALCIUM 9.5 11/11/2022 0729    ALKPHOS 28 (L) 09/13/2022 0658    AST 18 09/13/2022 0658    ALT 12 09/13/2022 0658    BILITOT 0.5 09/13/2022 0658    ESTGFRAFRICA 53.0 (A) 06/09/2022 0826    EGFRNONAA 45.9 (A) 06/09/2022 0826         Latest Reference Range & Units 05/22/23 09:06   Creatinine 0.5 - 1.4 mg/dL 1.5 (H)   eGFR >60 mL/min/1.73 m^2 49 !   (H): Data is abnormally high  !: Data is abnormal    Lab Results   Component Value Date    LDLCALC 79.2 09/13/2022      Latest Reference Range & Units Most Recent   Cholesterol 120 - 199 mg/dL 133  9/13/22 06:58   Cholesterol, Total <200  127 (E)  8/29/19 00:00   HDL 40 - 75 mg/dL 25 (L)  9/13/22 06:58   HDL/Cholesterol Ratio 20.0 - 50.0 % 18.8 (L)  9/13/22 06:58   CHOL/HDLC Ratio < OR = 5.0  4.7 (E)  8/29/19 00:00   LDL Cholesterol External 63.0 - 159.0 mg/dL 79.2  9/13/22 06:58   Non-HDL Cholesterol mg/dL 108  9/13/22 06:58   Total Cholesterol/HDL Ratio 2.0 - 5.0   5.3 (H)  9/13/22 06:58   Triglycerides 30 - 150 mg/dL 144  9/13/22 06:58     Lab Results   Component Value Date    MICALBCREAT 23.8 05/22/2023             ASSESSMENT and PLAN:    A1C GOAL: < 7.5 %     1. Diabetes mellitus type 2 with complications  Continue current medication regimen.   Test glucose 2x/day. New rx sent for glucose meter.   Order for Harrison 3 CGM also sent. Pt will contact clinic for education visit for training if it's affordable.   Avoid sweets. Discussed alternative healthier snacks like raw veggies and nuts.   Return to clinic in 4 months with labs prior    Hemoglobin A1C      2. Coronary artery disease involving native coronary artery of native heart without angina pectoris  Improve glycemic control.   Continue Farxiga and Oempic.       3. Stage 3a chronic kidney disease  Stable. Continue Farxiga.       4. Hyperlipidemia, unspecified hyperlipidemia type  Continue atorvastatin         Orders Placed This Encounter   Procedures    Hemoglobin A1C     Standing Status:   Future     Standing Expiration Date:   7/27/2024          Follow up in about 4 months (around 9/29/2023).

## 2023-05-29 NOTE — PATIENT INSTRUCTIONS
Continue current medication regimen.   Test glucose 2x/day. New rx sent for glucose meter.   Avoid sweets. Discussed alternative healthier snacks like raw veggies and nuts.   Return to clinic in 4 months with labs prior.

## 2023-06-12 ENCOUNTER — OFFICE VISIT (OUTPATIENT)
Dept: ORTHOPEDICS | Facility: CLINIC | Age: 73
End: 2023-06-12
Payer: MEDICARE

## 2023-06-12 DIAGNOSIS — M75.111 NONTRAUMATIC INCOMPLETE TEAR OF RIGHT ROTATOR CUFF: Primary | ICD-10-CM

## 2023-06-12 PROCEDURE — 3044F PR MOST RECENT HEMOGLOBIN A1C LEVEL <7.0%: ICD-10-PCS | Mod: CPTII,S$GLB,, | Performed by: ORTHOPAEDIC SURGERY

## 2023-06-12 PROCEDURE — 3061F PR NEG MICROALBUMINURIA RESULT DOCUMENTED/REVIEW: ICD-10-PCS | Mod: CPTII,S$GLB,, | Performed by: ORTHOPAEDIC SURGERY

## 2023-06-12 PROCEDURE — 1159F MED LIST DOCD IN RCRD: CPT | Mod: CPTII,S$GLB,, | Performed by: ORTHOPAEDIC SURGERY

## 2023-06-12 PROCEDURE — 1160F RVW MEDS BY RX/DR IN RCRD: CPT | Mod: CPTII,S$GLB,, | Performed by: ORTHOPAEDIC SURGERY

## 2023-06-12 PROCEDURE — 1160F PR REVIEW ALL MEDS BY PRESCRIBER/CLIN PHARMACIST DOCUMENTED: ICD-10-PCS | Mod: CPTII,S$GLB,, | Performed by: ORTHOPAEDIC SURGERY

## 2023-06-12 PROCEDURE — 99212 PR OFFICE/OUTPT VISIT, EST, LEVL II, 10-19 MIN: ICD-10-PCS | Mod: S$GLB,,, | Performed by: ORTHOPAEDIC SURGERY

## 2023-06-12 PROCEDURE — 3066F NEPHROPATHY DOC TX: CPT | Mod: CPTII,S$GLB,, | Performed by: ORTHOPAEDIC SURGERY

## 2023-06-12 PROCEDURE — 1125F AMNT PAIN NOTED PAIN PRSNT: CPT | Mod: CPTII,S$GLB,, | Performed by: ORTHOPAEDIC SURGERY

## 2023-06-12 PROCEDURE — 99212 OFFICE O/P EST SF 10 MIN: CPT | Mod: S$GLB,,, | Performed by: ORTHOPAEDIC SURGERY

## 2023-06-12 PROCEDURE — 99999 PR PBB SHADOW E&M-EST. PATIENT-LVL IV: CPT | Mod: PBBFAC,,, | Performed by: ORTHOPAEDIC SURGERY

## 2023-06-12 PROCEDURE — 1159F PR MEDICATION LIST DOCUMENTED IN MEDICAL RECORD: ICD-10-PCS | Mod: CPTII,S$GLB,, | Performed by: ORTHOPAEDIC SURGERY

## 2023-06-12 PROCEDURE — 3288F FALL RISK ASSESSMENT DOCD: CPT | Mod: CPTII,S$GLB,, | Performed by: ORTHOPAEDIC SURGERY

## 2023-06-12 PROCEDURE — 3061F NEG MICROALBUMINURIA REV: CPT | Mod: CPTII,S$GLB,, | Performed by: ORTHOPAEDIC SURGERY

## 2023-06-12 PROCEDURE — 1101F PR PT FALLS ASSESS DOC 0-1 FALLS W/OUT INJ PAST YR: ICD-10-PCS | Mod: CPTII,S$GLB,, | Performed by: ORTHOPAEDIC SURGERY

## 2023-06-12 PROCEDURE — 99999 PR PBB SHADOW E&M-EST. PATIENT-LVL IV: ICD-10-PCS | Mod: PBBFAC,,, | Performed by: ORTHOPAEDIC SURGERY

## 2023-06-12 PROCEDURE — 3066F PR DOCUMENTATION OF TREATMENT FOR NEPHROPATHY: ICD-10-PCS | Mod: CPTII,S$GLB,, | Performed by: ORTHOPAEDIC SURGERY

## 2023-06-12 PROCEDURE — 3044F HG A1C LEVEL LT 7.0%: CPT | Mod: CPTII,S$GLB,, | Performed by: ORTHOPAEDIC SURGERY

## 2023-06-12 PROCEDURE — 1125F PR PAIN SEVERITY QUANTIFIED, PAIN PRESENT: ICD-10-PCS | Mod: CPTII,S$GLB,, | Performed by: ORTHOPAEDIC SURGERY

## 2023-06-12 PROCEDURE — 3288F PR FALLS RISK ASSESSMENT DOCUMENTED: ICD-10-PCS | Mod: CPTII,S$GLB,, | Performed by: ORTHOPAEDIC SURGERY

## 2023-06-12 PROCEDURE — 1101F PT FALLS ASSESS-DOCD LE1/YR: CPT | Mod: CPTII,S$GLB,, | Performed by: ORTHOPAEDIC SURGERY

## 2023-06-12 NOTE — PROGRESS NOTES
Postoperative Visit    History of Present Illness:   Driss is 8 months s/p right shoulder ATS, regeneten, arthroscopic biceps tenodesis (DOS-7/5/22)  Is better than pre op but still has some limiations in activity limited to pain   Some sleep disturbance  Better after injection at last visit  Has some cramping to biceps muscle belly    Physical Examination:    Worst pain 4/10       NAD  right upper extremity:  Active FE to 150  Good cuff strength   TTP over anterior shoulder, pain not re created with sofiya brown   LTSI m/u/r  2+ RP  + EPL, IO, FDS, FDP     Assessment/Plan:  72 y.o. male 11 months s/p right shoulder arthroscopy , rotator cuff repair and arthroscopic biceps tenodesis  (DOS-7/5/22)    Plan  Continue HEP  Continue tylenol PRN  Activity as tolerated  Residual pain may be related to OA vs failure of tenodesis fixation.  He is ok with the pain and function level at this time, will observe for now   RTC for knees    All questions were answered in detail. The patient is in full agreement with the treatment plan and will proceed accordingly.

## 2023-06-16 ENCOUNTER — LAB VISIT (OUTPATIENT)
Dept: LAB | Facility: HOSPITAL | Age: 73
End: 2023-06-16
Attending: INTERNAL MEDICINE
Payer: MEDICARE

## 2023-06-16 ENCOUNTER — TELEPHONE (OUTPATIENT)
Dept: ENDOCRINOLOGY | Facility: CLINIC | Age: 73
End: 2023-06-16
Payer: MEDICARE

## 2023-06-16 ENCOUNTER — OFFICE VISIT (OUTPATIENT)
Dept: FAMILY MEDICINE | Facility: CLINIC | Age: 73
End: 2023-06-16
Payer: MEDICARE

## 2023-06-16 VITALS
SYSTOLIC BLOOD PRESSURE: 138 MMHG | OXYGEN SATURATION: 99 % | HEIGHT: 67 IN | HEART RATE: 68 BPM | BODY MASS INDEX: 28.3 KG/M2 | TEMPERATURE: 68 F | DIASTOLIC BLOOD PRESSURE: 72 MMHG | WEIGHT: 180.31 LBS

## 2023-06-16 DIAGNOSIS — I70.0 AORTIC ATHEROSCLEROSIS: ICD-10-CM

## 2023-06-16 DIAGNOSIS — E55.9 VITAMIN D DEFICIENCY DISEASE: ICD-10-CM

## 2023-06-16 DIAGNOSIS — L29.9 ITCHING: ICD-10-CM

## 2023-06-16 DIAGNOSIS — Z79.4 TYPE 2 DIABETES MELLITUS WITH HYPERGLYCEMIA, WITH LONG-TERM CURRENT USE OF INSULIN: ICD-10-CM

## 2023-06-16 DIAGNOSIS — I73.9 PAD (PERIPHERAL ARTERY DISEASE): ICD-10-CM

## 2023-06-16 DIAGNOSIS — B35.3 TINEA PEDIS, UNSPECIFIED LATERALITY: ICD-10-CM

## 2023-06-16 DIAGNOSIS — Z00.00 ROUTINE MEDICAL EXAM: Primary | ICD-10-CM

## 2023-06-16 DIAGNOSIS — N18.31 DIABETES MELLITUS DUE TO UNDERLYING CONDITION WITH STAGE 3A CHRONIC KIDNEY DISEASE, WITH LONG-TERM CURRENT USE OF INSULIN: ICD-10-CM

## 2023-06-16 DIAGNOSIS — M70.61 TROCHANTERIC BURSITIS OF RIGHT HIP: ICD-10-CM

## 2023-06-16 DIAGNOSIS — E08.22 DIABETES MELLITUS DUE TO UNDERLYING CONDITION WITH STAGE 3A CHRONIC KIDNEY DISEASE, WITH LONG-TERM CURRENT USE OF INSULIN: ICD-10-CM

## 2023-06-16 DIAGNOSIS — E11.42 DIABETIC POLYNEUROPATHY ASSOCIATED WITH TYPE 2 DIABETES MELLITUS: ICD-10-CM

## 2023-06-16 DIAGNOSIS — E11.9 DM TYPE 2 WITHOUT RETINOPATHY: ICD-10-CM

## 2023-06-16 DIAGNOSIS — I10 ESSENTIAL HYPERTENSION: ICD-10-CM

## 2023-06-16 DIAGNOSIS — F50.89 PICA: ICD-10-CM

## 2023-06-16 DIAGNOSIS — I25.709 ATHEROSCLEROSIS OF CORONARY ARTERY BYPASS GRAFT WITH ANGINA PECTORIS, UNSPECIFIED WHETHER NATIVE OR TRANSPLANTED HEART: ICD-10-CM

## 2023-06-16 DIAGNOSIS — N40.0 BENIGN PROSTATIC HYPERPLASIA, UNSPECIFIED WHETHER LOWER URINARY TRACT SYMPTOMS PRESENT: ICD-10-CM

## 2023-06-16 DIAGNOSIS — N18.31 STAGE 3A CHRONIC KIDNEY DISEASE: ICD-10-CM

## 2023-06-16 DIAGNOSIS — Z00.00 ROUTINE MEDICAL EXAM: ICD-10-CM

## 2023-06-16 DIAGNOSIS — Z12.5 SCREENING FOR PROSTATE CANCER: ICD-10-CM

## 2023-06-16 DIAGNOSIS — E78.2 MIXED HYPERLIPIDEMIA: ICD-10-CM

## 2023-06-16 DIAGNOSIS — I50.32 CHRONIC HEART FAILURE WITH PRESERVED EJECTION FRACTION: ICD-10-CM

## 2023-06-16 DIAGNOSIS — E78.6 HIGH-DENSITY LIPOPROTEIN DEFICIENCY: ICD-10-CM

## 2023-06-16 DIAGNOSIS — I65.23 ATHEROSCLEROSIS OF BOTH CAROTID ARTERIES: ICD-10-CM

## 2023-06-16 DIAGNOSIS — Z86.010 HISTORY OF COLONIC POLYPS: ICD-10-CM

## 2023-06-16 DIAGNOSIS — M47.816 LUMBAR SPONDYLOSIS: ICD-10-CM

## 2023-06-16 DIAGNOSIS — E11.65 TYPE 2 DIABETES MELLITUS WITH HYPERGLYCEMIA, WITH LONG-TERM CURRENT USE OF INSULIN: ICD-10-CM

## 2023-06-16 DIAGNOSIS — F13.20 SEDATIVE DEPENDENCE: ICD-10-CM

## 2023-06-16 DIAGNOSIS — Z79.4 LONG-TERM INSULIN USE: ICD-10-CM

## 2023-06-16 DIAGNOSIS — I48.11 LONGSTANDING PERSISTENT ATRIAL FIBRILLATION: ICD-10-CM

## 2023-06-16 DIAGNOSIS — H61.23 BILATERAL IMPACTED CERUMEN: ICD-10-CM

## 2023-06-16 DIAGNOSIS — M46.1 BILATERAL SACROILIITIS: ICD-10-CM

## 2023-06-16 DIAGNOSIS — Z79.4 DIABETES MELLITUS DUE TO UNDERLYING CONDITION WITH STAGE 3A CHRONIC KIDNEY DISEASE, WITH LONG-TERM CURRENT USE OF INSULIN: ICD-10-CM

## 2023-06-16 LAB
ALBUMIN SERPL BCP-MCNC: 3.7 G/DL (ref 3.5–5.2)
ALP SERPL-CCNC: 34 U/L (ref 55–135)
ALT SERPL W/O P-5'-P-CCNC: 13 U/L (ref 10–44)
ANION GAP SERPL CALC-SCNC: 10 MMOL/L (ref 8–16)
AST SERPL-CCNC: 18 U/L (ref 10–40)
BASOPHILS # BLD AUTO: 0.04 K/UL (ref 0–0.2)
BASOPHILS NFR BLD: 0.7 % (ref 0–1.9)
BILIRUB SERPL-MCNC: 0.6 MG/DL (ref 0.1–1)
BUN SERPL-MCNC: 27 MG/DL (ref 8–23)
CALCIUM SERPL-MCNC: 10.3 MG/DL (ref 8.7–10.5)
CHLORIDE SERPL-SCNC: 101 MMOL/L (ref 95–110)
CHOLEST SERPL-MCNC: 141 MG/DL (ref 120–199)
CHOLEST/HDLC SERPL: 5 {RATIO} (ref 2–5)
CO2 SERPL-SCNC: 29 MMOL/L (ref 23–29)
COMPLEXED PSA SERPL-MCNC: 0.88 NG/ML (ref 0–4)
CREAT SERPL-MCNC: 1.5 MG/DL (ref 0.5–1.4)
DIFFERENTIAL METHOD: ABNORMAL
EOSINOPHIL # BLD AUTO: 0.5 K/UL (ref 0–0.5)
EOSINOPHIL NFR BLD: 10 % (ref 0–8)
ERYTHROCYTE [DISTWIDTH] IN BLOOD BY AUTOMATED COUNT: 20.5 % (ref 11.5–14.5)
EST. GFR  (NO RACE VARIABLE): 48.9 ML/MIN/1.73 M^2
FERRITIN SERPL-MCNC: 17 NG/ML (ref 20–300)
GLUCOSE SERPL-MCNC: 112 MG/DL (ref 70–110)
HCT VFR BLD AUTO: 41.4 % (ref 40–54)
HDLC SERPL-MCNC: 28 MG/DL (ref 40–75)
HDLC SERPL: 19.9 % (ref 20–50)
HGB BLD-MCNC: 11.7 G/DL (ref 14–18)
IMM GRANULOCYTES # BLD AUTO: 0.01 K/UL (ref 0–0.04)
IMM GRANULOCYTES NFR BLD AUTO: 0.2 % (ref 0–0.5)
IRON SERPL-MCNC: 35 UG/DL (ref 45–160)
LDLC SERPL CALC-MCNC: 92.2 MG/DL (ref 63–159)
LYMPHOCYTES # BLD AUTO: 1.7 K/UL (ref 1–4.8)
LYMPHOCYTES NFR BLD: 31.4 % (ref 18–48)
MCH RBC QN AUTO: 19.6 PG (ref 27–31)
MCHC RBC AUTO-ENTMCNC: 28.3 G/DL (ref 32–36)
MCV RBC AUTO: 69 FL (ref 82–98)
MONOCYTES # BLD AUTO: 0.6 K/UL (ref 0.3–1)
MONOCYTES NFR BLD: 11.6 % (ref 4–15)
NEUTROPHILS # BLD AUTO: 2.5 K/UL (ref 1.8–7.7)
NEUTROPHILS NFR BLD: 46.1 % (ref 38–73)
NONHDLC SERPL-MCNC: 113 MG/DL
NRBC BLD-RTO: 0 /100 WBC
PLATELET # BLD AUTO: 308 K/UL (ref 150–450)
PMV BLD AUTO: 9.9 FL (ref 9.2–12.9)
POTASSIUM SERPL-SCNC: 4.6 MMOL/L (ref 3.5–5.1)
PROT SERPL-MCNC: 7.8 G/DL (ref 6–8.4)
RBC # BLD AUTO: 5.98 M/UL (ref 4.6–6.2)
SATURATED IRON: 6 % (ref 20–50)
SODIUM SERPL-SCNC: 140 MMOL/L (ref 136–145)
TOTAL IRON BINDING CAPACITY: 616 UG/DL (ref 250–450)
TRANSFERRIN SERPL-MCNC: 416 MG/DL (ref 200–375)
TRIGL SERPL-MCNC: 104 MG/DL (ref 30–150)
TSH SERPL DL<=0.005 MIU/L-ACNC: 3.95 UIU/ML (ref 0.4–4)
WBC # BLD AUTO: 5.42 K/UL (ref 3.9–12.7)

## 2023-06-16 PROCEDURE — 99397 PR PREVENTIVE VISIT,EST,65 & OVER: ICD-10-PCS | Mod: S$GLB,,, | Performed by: INTERNAL MEDICINE

## 2023-06-16 PROCEDURE — 80061 LIPID PANEL: CPT | Performed by: INTERNAL MEDICINE

## 2023-06-16 PROCEDURE — 3008F BODY MASS INDEX DOCD: CPT | Mod: CPTII,S$GLB,, | Performed by: INTERNAL MEDICINE

## 2023-06-16 PROCEDURE — 80053 COMPREHEN METABOLIC PANEL: CPT | Performed by: INTERNAL MEDICINE

## 2023-06-16 PROCEDURE — 3066F NEPHROPATHY DOC TX: CPT | Mod: CPTII,S$GLB,, | Performed by: INTERNAL MEDICINE

## 2023-06-16 PROCEDURE — 36415 COLL VENOUS BLD VENIPUNCTURE: CPT | Mod: PO | Performed by: INTERNAL MEDICINE

## 2023-06-16 PROCEDURE — 1126F AMNT PAIN NOTED NONE PRSNT: CPT | Mod: CPTII,S$GLB,, | Performed by: INTERNAL MEDICINE

## 2023-06-16 PROCEDURE — 1101F PR PT FALLS ASSESS DOC 0-1 FALLS W/OUT INJ PAST YR: ICD-10-PCS | Mod: CPTII,S$GLB,, | Performed by: INTERNAL MEDICINE

## 2023-06-16 PROCEDURE — 84153 ASSAY OF PSA TOTAL: CPT | Performed by: INTERNAL MEDICINE

## 2023-06-16 PROCEDURE — 3061F NEG MICROALBUMINURIA REV: CPT | Mod: CPTII,S$GLB,, | Performed by: INTERNAL MEDICINE

## 2023-06-16 PROCEDURE — 99214 PR OFFICE/OUTPT VISIT, EST, LEVL IV, 30-39 MIN: ICD-10-PCS | Mod: 25,S$GLB,, | Performed by: INTERNAL MEDICINE

## 2023-06-16 PROCEDURE — 82728 ASSAY OF FERRITIN: CPT | Performed by: INTERNAL MEDICINE

## 2023-06-16 PROCEDURE — 3288F PR FALLS RISK ASSESSMENT DOCUMENTED: ICD-10-PCS | Mod: CPTII,S$GLB,, | Performed by: INTERNAL MEDICINE

## 2023-06-16 PROCEDURE — 3075F SYST BP GE 130 - 139MM HG: CPT | Mod: CPTII,S$GLB,, | Performed by: INTERNAL MEDICINE

## 2023-06-16 PROCEDURE — 3008F PR BODY MASS INDEX (BMI) DOCUMENTED: ICD-10-PCS | Mod: CPTII,S$GLB,, | Performed by: INTERNAL MEDICINE

## 2023-06-16 PROCEDURE — 99999 PR PBB SHADOW E&M-EST. PATIENT-LVL V: ICD-10-PCS | Mod: PBBFAC,,, | Performed by: INTERNAL MEDICINE

## 2023-06-16 PROCEDURE — 3066F PR DOCUMENTATION OF TREATMENT FOR NEPHROPATHY: ICD-10-PCS | Mod: CPTII,S$GLB,, | Performed by: INTERNAL MEDICINE

## 2023-06-16 PROCEDURE — 1101F PT FALLS ASSESS-DOCD LE1/YR: CPT | Mod: CPTII,S$GLB,, | Performed by: INTERNAL MEDICINE

## 2023-06-16 PROCEDURE — 85025 COMPLETE CBC W/AUTO DIFF WBC: CPT | Performed by: INTERNAL MEDICINE

## 2023-06-16 PROCEDURE — 99214 OFFICE O/P EST MOD 30 MIN: CPT | Mod: 25,S$GLB,, | Performed by: INTERNAL MEDICINE

## 2023-06-16 PROCEDURE — 1159F PR MEDICATION LIST DOCUMENTED IN MEDICAL RECORD: ICD-10-PCS | Mod: CPTII,S$GLB,, | Performed by: INTERNAL MEDICINE

## 2023-06-16 PROCEDURE — 1126F PR PAIN SEVERITY QUANTIFIED, NO PAIN PRESENT: ICD-10-PCS | Mod: CPTII,S$GLB,, | Performed by: INTERNAL MEDICINE

## 2023-06-16 PROCEDURE — 84466 ASSAY OF TRANSFERRIN: CPT | Performed by: INTERNAL MEDICINE

## 2023-06-16 PROCEDURE — 3061F PR NEG MICROALBUMINURIA RESULT DOCUMENTED/REVIEW: ICD-10-PCS | Mod: CPTII,S$GLB,, | Performed by: INTERNAL MEDICINE

## 2023-06-16 PROCEDURE — 1159F MED LIST DOCD IN RCRD: CPT | Mod: CPTII,S$GLB,, | Performed by: INTERNAL MEDICINE

## 2023-06-16 PROCEDURE — 3288F FALL RISK ASSESSMENT DOCD: CPT | Mod: CPTII,S$GLB,, | Performed by: INTERNAL MEDICINE

## 2023-06-16 PROCEDURE — 3044F HG A1C LEVEL LT 7.0%: CPT | Mod: CPTII,S$GLB,, | Performed by: INTERNAL MEDICINE

## 2023-06-16 PROCEDURE — 84443 ASSAY THYROID STIM HORMONE: CPT | Performed by: INTERNAL MEDICINE

## 2023-06-16 PROCEDURE — 3078F DIAST BP <80 MM HG: CPT | Mod: CPTII,S$GLB,, | Performed by: INTERNAL MEDICINE

## 2023-06-16 PROCEDURE — 99397 PER PM REEVAL EST PAT 65+ YR: CPT | Mod: S$GLB,,, | Performed by: INTERNAL MEDICINE

## 2023-06-16 PROCEDURE — 3044F PR MOST RECENT HEMOGLOBIN A1C LEVEL <7.0%: ICD-10-PCS | Mod: CPTII,S$GLB,, | Performed by: INTERNAL MEDICINE

## 2023-06-16 PROCEDURE — 3078F PR MOST RECENT DIASTOLIC BLOOD PRESSURE < 80 MM HG: ICD-10-PCS | Mod: CPTII,S$GLB,, | Performed by: INTERNAL MEDICINE

## 2023-06-16 PROCEDURE — 99999 PR PBB SHADOW E&M-EST. PATIENT-LVL V: CPT | Mod: PBBFAC,,, | Performed by: INTERNAL MEDICINE

## 2023-06-16 PROCEDURE — 3075F PR MOST RECENT SYSTOLIC BLOOD PRESS GE 130-139MM HG: ICD-10-PCS | Mod: CPTII,S$GLB,, | Performed by: INTERNAL MEDICINE

## 2023-06-16 RX ORDER — LANCETS 33 GAUGE
EACH MISCELLANEOUS
COMMUNITY
Start: 2023-05-30

## 2023-06-16 RX ORDER — KETOCONAZOLE 20 MG/G
CREAM TOPICAL DAILY
Qty: 60 G | Refills: 6 | Status: SHIPPED | OUTPATIENT
Start: 2023-06-16

## 2023-06-16 RX ORDER — NITROGLYCERIN 0.4 MG/1
0.4 TABLET SUBLINGUAL EVERY 5 MIN PRN
Qty: 25 TABLET | Refills: 11 | Status: SHIPPED | OUTPATIENT
Start: 2023-06-16 | End: 2024-06-15

## 2023-06-16 NOTE — TELEPHONE ENCOUNTER
Pts wife notified that orders were sent to [facility] medical on 5/30 and number given for he to call and check the status

## 2023-06-16 NOTE — TELEPHONE ENCOUNTER
----- Message from Rosa Portillo sent at 6/16/2023  1:31 PM CDT -----  Type: Patient Call Back    Who called: wife/ Mrs Hernandez     What is the request in detail: would like to know if monitor was order. Please call    Can the clinic reply by KITANER? no    Would the patient rather a call back or a response via [facility]?  call    Best call back number: [phone]    Additional Information:

## 2023-06-16 NOTE — PROGRESS NOTES
Chief complaint:   Physical exam    72-year-old white male.  colonoscopy 3 polyps 2/17 -5 yrs. PSA 2020 .  He is active and otherwise healthy      ROS:   CONST: weight stable. EYES: no vision change. ENT: no sore throat. CV: no chest pain w/ exertion. RESP: no shortness of breath. GI: no nausea, vomiting, diarrhea. No dysphagia. : no urinary issues. MUSCULOSKELETAL: no new myalgias or arthralgias. SKIN: no new changes. NEURO: no focal deficits. PSYCH: no new issues. ENDOCRINE: no polyuria. HEME: no lymph nodes. ALLERGY: no general pruritis.      Past medical history:  1.  Uncontrolled diabetes with neuropathy, followed by Dr. Agrawal  2.  Coronary artery disease with triple bypass March 2016, followed by the heart clinic. Now [facility], neg PET 2017, Nuc/echo neg 3/20,  3.  Back surgery 2002.  5.  Hyperlipidemia.  6.  Hypertension.  7.   colonoscopy 3 polyps 2/17 -5 yrs  8.  Pneumovax and Prevnar given 2015 according to records  9.  Right leg radiculopathy, confirmed by MRI, did respond epidural with [facility] pain management  10. Partial small-bowel obstruction October 2018  11.  Abnormal TSH on occasion    Past Surgical History:   Procedure Laterality Date    ARTHROSCOPIC REPAIR OF ROTATOR CUFF OF SHOULDER Right 7/5/2022    Procedure: REPAIR, ROTATOR CUFF, ARTHROSCOPIC;  Surgeon: Valerie Olivera MD;  Location: [facility] OR;  Service: Orthopedics;  Laterality: Right;  GUADALUPE AND NEPHJENNIFER LEMUS [phone]/ TEXTED ALLAN ON 6/23/2022 @ 9:47AM. ALLAN RESPONDED ON 6/23/2022 @ 10:33AM-  JEAN PAUL BABIN [phone] MY BE HERE FOR CASE SPOKE TO HIM @ 9:59AM ON 7-1-2022  RN PREOP 6/28/2022    BACK SURGERY      2002    CATARACT EXTRACTION W/  INTRAOCULAR LENS IMPLANT Right 08/29/2017    Dr. Hong    CATARACT EXTRACTION W/  INTRAOCULAR LENS IMPLANT Left 02/24/2022    Dr. Hong    COLONOSCOPY N/A 2/21/2017    Procedure: COLONOSCOPY;  Surgeon: Robb Rosado MD;  Location: [facility] ENDO;  Service: Endoscopy;   Laterality: N/A;    CORONARY ANGIOGRAPHY INCLUDING BYPASS GRAFTS WITH CATHETERIZATION OF LEFT HEART N/A 2020    Procedure: ANGIOGRAM, CORONARY, INCLUDING BYPASS GRAFT, WITH LEFT HEART CATHETERIZATION;  Surgeon: Lane Cuellar MD;  Location: [facility] CATH LAB;  Service: Cardiology;  Laterality: N/A;  RN PRE OP 2020. --COVID NEGATIVE ON  11-. C A    CORONARY ARTERY BYPASS GRAFT      2016    EPIDURAL STEROID INJECTION Bilateral 2018    Procedure: Lumbar Medial Branch Blocks;  Surgeon: Eze Byrd Jr., MD;  Location: [facility] ENDO;  Service: Pain Management;  Laterality: Bilateral;  Bilateral Lumbar Medial Branch Blocks L2, L3, L4, L5    79254  11331    Arrive @ 1150; NO Sedation    EPIDURAL STEROID INJECTION Bilateral 2018    Procedure: Injection, Steroid, Epidural;  Surgeon: Eze Byrd Jr., MD;  Location: [facility] ENDO;  Service: Pain Management;  Laterality: Bilateral;  Bilateral Sacroiliac Joint Steroid Injections    39323    Arrive @ 0910    EPIDURAL STEROID INJECTION Bilateral 3/20/2019    Procedure: Injection, Steroid, Sacroiliiac Joint;  Surgeon: Eze Byrd Jr., MD;  Location: [facility] ENDO;  Service: Pain Management;  Laterality: Bilateral;  Bilateral Sacroiliac Joint Steroid Injections    [id number]    Arrive @ 1145; Trigger point injections also?    INTRAOCULAR PROSTHESES INSERTION Left 2022    Procedure: INSERTION, IOL PROSTHESIS;  Surgeon: Nickolas Hong MD;  Location: [facility] OR;  Service: Ophthalmology;  Laterality: Left;    PHACOEMULSIFICATION OF CATARACT Left 2022    Procedure: PHACOEMULSIFICATION, CATARACT;  Surgeon: Nickolas Hong MD;  Location: [facility] OR;  Service: Ophthalmology;  Laterality: Left;  RN Phone Pre Op 22.  Covid NEGATIVE  22.  Arrival 08:00 am.    TONSILLECTOMY           Family history: Father  at 77 with stroke.  Mother  at 72 with heart problems, diabetes, hypertension.  2 sisters living in their 80s and  one  at 82.  One brother  at 80 with some kidney disease and diabetes.  Sisters with diabetes, hypertension and stroke.  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  He drinks alcohol about 3 times.  Never smoked.      Vital signs as above,     Gen: no distress  EYES: conjunctiva clear, non-icteric, PERRL  ENT: nose clear, nasal mucosa normal, oropharynx clear and moist, teeth good  NECK:supple, thyroid non-palpable  RESP: effort is good, lungs clear  CV: heart RRR w/o murmur, gallops or rubs; no carotid bruits, no edema  GI: abdomen soft, non-distended, non-tender, no hepatosplenomegaly  MS: gait normal, no clubbing or cyanosis of the digits  SKIN: no rashes, warm to touch    Driss was seen today for pre-op exam.    Diagnoses and all orders for this visit:    Routine medical exam    Screening for prostate cancer- overdue    History of colonic polyps- due                                              Additional evaluation and management issues:    Additionally patient has other medical issues to address separate from his physical.  Patient has a list of issue.  He has a sore patch on the arch of the right foot which is about a quarter-sized area of redness that is blanching with a pustular to appears that is dry and fungal.  We will use Nizoral cream     Eating more ice will check for anemia     Some pain in the right lateral hip when laying on that side.  Reassured it was trochanteric bursitis and discussed reducing direct pressure on it.    Also reports an unusual itching all over that has been ongoing for years.  It only occurs in the evening.  He has chairs that he sits in it usually occurs then.  He does have cats who sit on the chair.  Also occurs when he lays in bed.  It is on both arms and both legs at times sometimes one of the other and the back of his head.  He never gets any rash.  It feels  as if bugs are biting gum but there are no bug bites.  We discussed it could be a contact reaction to the cats who are getting on those services, the CT here and also possibly laundry detergent fabric softener given the location and so forth.  He might need to try a different detergent.  Also could be psychological although wife gets some itching to when laying in the bed.    He does get intermittent congestion in the ears.  It does look like he has some significant cerumen impaction deep within the ears.  Will refer him to ENT.  He has had cerumen impactions before.        Seen Chambers  ASSESSMENT:   # CAD s/p CABG/PCI OM 12/2020, MAYEN and cough much improved.   # HTN, uncontrolled  # HFpEF, mildly vol overloaded on exam, but improved vs prior OV.  # Chr AF on eliquis 5mg bid, HR controlled  # HLP on atorva 40mg  # PAD, abnl NUSRAT 4/18/18  # carotid atherosclerosis (CUS 3/2022)  # L>R LE edema, improved.  LE venous US 4/2022 neg.  # aortic atherosclerosis (CT Chest 10/29/18)  # preop for R shoulder ortho procedure.  The patient demonstrated good exercise capacity office today.     PLAN:   Cont med rx  The patient is at low cardiac risk for the planned orthopedic surgical procedure and associated anesthesia.  No further preoperative cardiac testing is required.  If needed, it is acceptable hold the Eliquis for 3 days prior to surgery and resume it as soon as possible thereafter.  If needed, it is acceptable to hold the Plavix for 5 days prior to surgery and resume it as soon as possible thereafter.  Cont Plavix 75mg qd  Cont eliquis 5mg bid  Cont lasix 20mg bid, may have to accept some degree of azotemia to maintain euvolemia.  Dec amlod 5mg qd (?contributing to edema/vol overload), consider stopping the future.  Add hydrala 50mg bid  Check BMP 2 weeks  Enroll in digital med programs  RTC 4 weeks for review and titration of hydrala/stopping amlod.  Consider addition of entresto (cost concerns at present).  Check  lipids/LFT 2 months (July 2022)  Surveillance carotid US 5 months (Sept 2022)      Also with uncontrolled diabetes which he says is worse due to dietary issues related to the pandemic but then 5/23 down to 6.6.  He needs to get a new endocrinologist as his one at [place] now works for [employer] insurance requires him to see [facility].  We discussed some local options- seen Endo NP in Feb.  A1c did increase some.    Reduce glimepiride 2 mg once daily before breakfast.   Continue lantus 30 units once daily, Ozempic 1 mg once weekly, and metformin.   Test glucose 2x/day.   Follow up in 3 months with labs prior    He also occasionally take Halcion as Ambien we give him a hangover.  He does use Zanaflex at night to help him sleep.  We discussed the amnesia issues related to how she on any takes it rarely       Nuc and Echo ok 3/20    Regarding diabetes he was managed by outside Endocrine.  His A1c has been uncontrolled chronically -8.4 in may, 7.7, 8.2, 6.2 , 6.9, 7.4, 6.6  .  We discussed diet improvement.  He has chosen to use regular insulin due to cost.  We discussed that it may well need to be taken 30 min before the meal and could last for hours and he was not aware of that.  He can discuss in greater detail with his endocrinologist.  Continues on tresiba 20  He is also on a weekly injection.  He was thinking about stopping it but encouraged him to do so as it may well be helping with his appetite over eating.    Discussed his medications that will cause glucose loss of urine and he will need to supplement fluid intake for what increased output he may have otherwise he can worsen renal insufficiency.  He does not like drinking water but will do so and discussed other water alternatives.    Apparently on a weekly injection and was discontinued due to the high triglycerides but I pointed out that is triglyceride elevation has been something that has fluctuated over the years and more so relates to diet and  his uncontrolled diabetes.  He is now on fish oil as well as another pill for the high triglycerides.  He is tolerating it with  continued reflux and we discussed trying a different preparation.  He has already put them in the freezer..  He will need reassessment in a couple of months.    The other issue is an elevated TSH.  It looks like his TSH went up into the seven range and then 4.2 a year ago.  I explained hypothyroidism to him at length.  He had a daughter who developed at age 17.  We discussed that it will not be a significant health issue for him to remain hypothyroid until he figures this out.  He would prefer not to take medication and it does look like the TSH is trending down it did this in the past as well.      He is due to update lab work.      Evaluation and management of all the separate issues would based on medical decision making.  Labs and x-ray reports reviewed          Assessment plan:        Itching, unusual itching pattern as above but it has been ongoing for years so consider some ongoing contact reaction although no rash and could also be psychogenic in some way but he needs to address the CT exposure and detergents based upon location and so forth.  Unusual that it occurs only in the evening.    Essential hypertension, chronic and stable  -     TSH; Future  -     Lipid Panel; Future  -     CBC Auto Differential; Future  -     Comprehensive Metabolic Panel; Future    Type 2 diabetes mellitus with hyperglycemia, with long-term current use of insulin, followed by Endocrine appears better    DM type 2 without retinopathy    Long-term insulin use    Chronic heart failure with preserved ejection fraction, followed by Cardiology    Stage 3a chronic kidney disease, reassess    Diabetic polyneuropathy associated with type 2 diabetes mellitus    Atherosclerosis of coronary artery bypass graft with angina pectoris, unspecified whether native or transplanted heart    Aortic atherosclerosis    Lumbar  spondylosis    Longstanding persistent atrial fibrillation    Sedative dependence    Mixed hyperlipidemia    Atherosclerosis of both carotid arteries    PAD (peripheral artery disease)    Bilateral sacroiliitis    Diabetes mellitus due to underlying condition with stage 3a chronic kidney disease, with long-term current use of insulin    High-density lipoprotein deficiency    Vitamin D deficiency disease    Benign prostatic hyperplasia, unspecified whether lower urinary tract symptoms present  -     Prostate Specific Antigen, Diagnostic; Future    Tinea pedis, unspecified laterality, medication prescribed    Pica  -     Iron and TIBC; Future  -     Ferritin; Future    Bilateral impacted cerumen  -     Ambulatory referral/consult to ENT; Future    Trochanteric bursitis of right hip, new diagnosis, discussed    Other orders  -     nitroGLYCERIN (NITROSTAT) 0.4 MG SL tablet; Place 1 tablet (0.4 mg total) under the tongue every 5 (five) minutes as needed for Chest pain.  -     ketoconazole (NIZORAL) 2 % cream; Apply topically once daily.

## 2023-06-17 ENCOUNTER — PATIENT MESSAGE (OUTPATIENT)
Dept: FAMILY MEDICINE | Facility: CLINIC | Age: 73
End: 2023-06-17
Payer: MEDICARE

## 2023-06-17 DIAGNOSIS — Z86.010 HISTORY OF COLONIC POLYPS: Primary | ICD-10-CM

## 2023-06-17 DIAGNOSIS — D50.9 IRON DEFICIENCY ANEMIA, UNSPECIFIED IRON DEFICIENCY ANEMIA TYPE: ICD-10-CM

## 2023-06-19 DIAGNOSIS — E78.2 MIXED HYPERLIPIDEMIA: ICD-10-CM

## 2023-06-19 NOTE — TELEPHONE ENCOUNTER
No care due was identified.  [facility] Embedded Care Due Messages. Reference number: [id number].   6/19/2023 2:15:14 PM CDT

## 2023-06-20 DIAGNOSIS — E78.2 MIXED HYPERLIPIDEMIA: ICD-10-CM

## 2023-06-20 RX ORDER — OMEGA-3-ACID ETHYL ESTERS 1 G/1
2 CAPSULE, LIQUID FILLED ORAL 2 TIMES DAILY
Qty: 360 CAPSULE | Refills: 3 | Status: SHIPPED | OUTPATIENT
Start: 2023-06-20 | End: 2023-11-30 | Stop reason: SDUPTHER

## 2023-06-20 RX ORDER — OMEGA-3-ACID ETHYL ESTERS 1 G/1
2 CAPSULE, LIQUID FILLED ORAL 2 TIMES DAILY
Qty: 360 CAPSULE | Refills: 3 | OUTPATIENT
Start: 2023-06-20

## 2023-06-20 NOTE — TELEPHONE ENCOUNTER
Refill Decision Note   Driss Hernandez  is requesting a refill authorization.  Brief Assessment and Rationale for Refill:  Quick Discontinue     Medication Therapy Plan:       Medication Reconciliation Completed: Yes   Comments:     No Care Gaps recommended.     Duplicate Pended Encounter(s)/ Last Prescribed Details:    Pharmacy    [id number] - [place] - [address]   [address] [place] [address]   Phone:  [phone]  Fax:  [phone]   DUNIA #:  --   TYREL Reason: --     Outpatient Medication Detail     Disp Refills Start End TYREL   omega-3 acid ethyl esters (LOVAZA) 1 gram capsule 360 capsule 3 6/20/2023  No   Sig - Route: Take 2 capsules (2 g total) by mouth 2 (two) times daily. - Oral   Sent to pharmacy as: omega-3 acid ethyl esters (LOVAZA) 1 gram capsule   Class: Normal   Order: [id number]   Cosign for Ordering: Required by Jono Willoughby MD   Date/Time Signed: 6/20/2023 13:56       E-Prescribing Status: Receipt confirmed by pharmacy (6/20/2023  1:57 PM CDT)     Ordering Encounter Report    Associated Reports   View Encounter            Note composed:2:07 PM 06/20/2023   Note composed:2:07 PM 06/20/2023

## 2023-06-20 NOTE — TELEPHONE ENCOUNTER
No care due was identified.  [facility] Embedded Care Due Messages. Reference number: [id number].   6/20/2023 10:36:45 AM CDT

## 2023-06-20 NOTE — TELEPHONE ENCOUNTER
No care due was identified.  [facility] Embedded Care Due Messages. Reference number: [id number].   6/20/2023 10:38:13 AM CDT

## 2023-06-20 NOTE — TELEPHONE ENCOUNTER
Refill Decision Note   Driss BurtHernandez  is requesting a refill authorization.  Brief Assessment and Rationale for Refill:  Approve     Medication Therapy Plan:       Medication Reconciliation Completed: No    Comments:     No Care Gaps recommended.     Note composed:1:56 PM 06/20/2023

## 2023-06-21 RX ORDER — OMEGA-3-ACID ETHYL ESTERS 1 G/1
CAPSULE, LIQUID FILLED ORAL
Qty: 360 CAPSULE | Refills: 0 | OUTPATIENT
Start: 2023-06-21

## 2023-06-21 NOTE — TELEPHONE ENCOUNTER
Refill Decision Note   Driss Hernandez  is requesting a refill authorization.  Brief Assessment and Rationale for Refill:  Quick Discontinue     Medication Therapy Plan:  Receipt confirmed by pharmacy (6/20/2023  1:57 PM CDT)      Comments:     Note composed:8:54 AM 06/21/2023

## 2023-06-22 ENCOUNTER — TELEPHONE (OUTPATIENT)
Dept: ENDOSCOPY | Facility: HOSPITAL | Age: 73
End: 2023-06-22

## 2023-06-22 ENCOUNTER — CLINICAL SUPPORT (OUTPATIENT)
Dept: ENDOSCOPY | Facility: HOSPITAL | Age: 73
End: 2023-06-22
Attending: INTERNAL MEDICINE
Payer: MEDICARE

## 2023-06-22 VITALS — HEIGHT: 67 IN | WEIGHT: 180 LBS | BODY MASS INDEX: 28.25 KG/M2

## 2023-06-22 DIAGNOSIS — D50.9 IRON DEFICIENCY ANEMIA, UNSPECIFIED IRON DEFICIENCY ANEMIA TYPE: ICD-10-CM

## 2023-06-22 DIAGNOSIS — Z12.11 COLON CANCER SCREENING: Primary | ICD-10-CM

## 2023-06-22 DIAGNOSIS — Z86.010 HISTORY OF COLONIC POLYPS: ICD-10-CM

## 2023-06-22 RX ORDER — SODIUM, POTASSIUM,MAG SULFATES 17.5-3.13G
1 SOLUTION, RECONSTITUTED, ORAL ORAL DAILY
Qty: 1 KIT | Refills: 0 | Status: SHIPPED | OUTPATIENT
Start: 2023-06-22 | End: 2023-06-24

## 2023-06-22 NOTE — TELEPHONE ENCOUNTER
Spoke to pt to schedule procedure(s) Colonoscopy       Physician to perform procedure(s) Dr. MEL Varela  Date of Procedure (s) 7/5/23  Arrival Time 1:00 PM  Time of Procedure(s) 2:00 PM   Location of Procedure(s) [address]  Type of Rx Prep sent to patient: Suprep  Instructions provided to patient via Email    Patient was informed on the following information and verbalized understanding. Screening questionnaire reviewed with patient and complete. If procedure requires anesthesia, a responsible adult needs to be present to accompany the patient home, patient cannot drive after receiving anesthesia. Appointment details are tentative, especially check-in time. Patient will receive a prep-op call 4 days prior to confirm check-in time for procedure. If applicable the patient should contact their pharmacy to verify Rx for procedure prep is ready for pick-up. Patient was advised to call the scheduling department at [phone] if pharmacy states no Rx is available. Patient was advised to call the endoscopy scheduling department if any questions or concerns arise.       Endoscopy Scheduling Department        Message sent to Endoscopy clearance nurse per protocol to submit Eliquis and Plavix hold to Dr. Purdy

## 2023-06-22 NOTE — TELEPHONE ENCOUNTER
Dear Dr Purdy-    Patient has a scheduled procedure Colonoscopy on 7/5/23 and is currently taking a blood thinner prescribed by your office. In order to ensure patient safety, we would like to confirm that the patient can place their blood thinner medication on hold for the procedure. Can he discontinue Plavix (clopidogrel) for a minimum of 5 days and Eliquis 2 days prior to the procedure?     Thank you for your prompt reply.    [facility] Endoscopy Scheduling

## 2023-06-23 ENCOUNTER — TELEPHONE (OUTPATIENT)
Dept: ENDOSCOPY | Facility: HOSPITAL | Age: 73
End: 2023-06-23
Payer: MEDICARE

## 2023-06-23 NOTE — TELEPHONE ENCOUNTER
----- Message from Stephanie Espinoza sent at 2023 11:56 AM CDT -----  Regardin/5 BT  The patient is currently under an internal cardiologist Eze sexton and requires a blood thinner Eliquis (apixaban) and Plavix hold for their upcoming scheduled Colonoscopy on 23.

## 2023-06-23 NOTE — TELEPHONE ENCOUNTER
Patient has been approved to hold Plavix (clopidogrel) for 5 days day(s) and Eliquis 2 days per Dr. Purdy.

## 2023-06-24 ENCOUNTER — HOSPITAL ENCOUNTER (EMERGENCY)
Facility: HOSPITAL | Age: 73
Discharge: HOME | End: 2023-06-25
Attending: EMERGENCY MEDICINE
Payer: MEDICARE

## 2023-06-24 DIAGNOSIS — R07.9 CHEST PAIN: ICD-10-CM

## 2023-06-24 DIAGNOSIS — R10.13 EPIGASTRIC ABDOMINAL PAIN: Primary | ICD-10-CM

## 2023-06-24 LAB
ALBUMIN SERPL BCP-MCNC: 3.7 G/DL (ref 3.5–5.2)
ALP SERPL-CCNC: 29 U/L (ref 55–135)
ALT SERPL W/O P-5'-P-CCNC: 15 U/L (ref 10–44)
ANION GAP SERPL CALC-SCNC: 10 MMOL/L (ref 8–16)
AST SERPL-CCNC: 22 U/L (ref 10–40)
BACTERIA #/AREA URNS HPF: NORMAL /HPF
BASOPHILS # BLD AUTO: 0.02 K/UL (ref 0–0.2)
BASOPHILS NFR BLD: 0.3 % (ref 0–1.9)
BILIRUB SERPL-MCNC: 0.4 MG/DL (ref 0.1–1)
BILIRUB UR QL STRIP: NEGATIVE
BNP SERPL-MCNC: 182 PG/ML (ref 0–99)
BUN SERPL-MCNC: 25 MG/DL (ref 8–23)
CALCIUM SERPL-MCNC: 9.4 MG/DL (ref 8.7–10.5)
CHLORIDE SERPL-SCNC: 107 MMOL/L (ref 95–110)
CLARITY UR: CLEAR
CO2 SERPL-SCNC: 23 MMOL/L (ref 23–29)
COLOR UR: YELLOW
CREAT SERPL-MCNC: 1.4 MG/DL (ref 0.5–1.4)
D DIMER PPP IA.FEU-MCNC: 0.52 MG/L FEU
DIFFERENTIAL METHOD: ABNORMAL
EOSINOPHIL # BLD AUTO: 0.4 K/UL (ref 0–0.5)
EOSINOPHIL NFR BLD: 5.1 % (ref 0–8)
ERYTHROCYTE [DISTWIDTH] IN BLOOD BY AUTOMATED COUNT: 20.7 % (ref 11.5–14.5)
EST. GFR  (NO RACE VARIABLE): 53 ML/MIN/1.73 M^2
GLUCOSE SERPL-MCNC: 81 MG/DL (ref 70–110)
GLUCOSE UR QL STRIP: ABNORMAL
HCT VFR BLD AUTO: 37 % (ref 40–54)
HGB BLD-MCNC: 10.8 G/DL (ref 14–18)
HGB UR QL STRIP: NEGATIVE
IMM GRANULOCYTES # BLD AUTO: 0.02 K/UL (ref 0–0.04)
IMM GRANULOCYTES NFR BLD AUTO: 0.3 % (ref 0–0.5)
INR PPP: 1.1 (ref 0.8–1.2)
KETONES UR QL STRIP: NEGATIVE
LACTATE SERPL-SCNC: 0.8 MMOL/L (ref 0.5–2.2)
LEUKOCYTE ESTERASE UR QL STRIP: NEGATIVE
LIPASE SERPL-CCNC: 78 U/L (ref 4–60)
LYMPHOCYTES # BLD AUTO: 1.5 K/UL (ref 1–4.8)
LYMPHOCYTES NFR BLD: 21.7 % (ref 18–48)
MCH RBC QN AUTO: 20.1 PG (ref 27–31)
MCHC RBC AUTO-ENTMCNC: 29.2 G/DL (ref 32–36)
MCV RBC AUTO: 69 FL (ref 82–98)
MICROSCOPIC COMMENT: NORMAL
MONOCYTES # BLD AUTO: 0.7 K/UL (ref 0.3–1)
MONOCYTES NFR BLD: 9.3 % (ref 4–15)
NEUTROPHILS # BLD AUTO: 4.5 K/UL (ref 1.8–7.7)
NEUTROPHILS NFR BLD: 63.3 % (ref 38–73)
NITRITE UR QL STRIP: NEGATIVE
NRBC BLD-RTO: 0 /100 WBC
PH UR STRIP: 6 [PH] (ref 5–8)
PLATELET # BLD AUTO: 245 K/UL (ref 150–450)
PMV BLD AUTO: 9.1 FL (ref 9.2–12.9)
POTASSIUM SERPL-SCNC: 4.2 MMOL/L (ref 3.5–5.1)
PROT SERPL-MCNC: 7.3 G/DL (ref 6–8.4)
PROT UR QL STRIP: ABNORMAL
PROTHROMBIN TIME: 11.9 SEC (ref 9–12.5)
RBC # BLD AUTO: 5.37 M/UL (ref 4.6–6.2)
SODIUM SERPL-SCNC: 140 MMOL/L (ref 136–145)
SP GR UR STRIP: 1.02 (ref 1–1.03)
TROPONIN I SERPL DL<=0.01 NG/ML-MCNC: 0.05 NG/ML (ref 0–0.03)
URN SPEC COLLECT METH UR: ABNORMAL
UROBILINOGEN UR STRIP-ACNC: NEGATIVE EU/DL
WBC # BLD AUTO: 7.09 K/UL (ref 3.9–12.7)
YEAST URNS QL MICRO: NORMAL

## 2023-06-24 PROCEDURE — 80053 COMPREHEN METABOLIC PANEL: CPT | Performed by: EMERGENCY MEDICINE

## 2023-06-24 PROCEDURE — 85379 FIBRIN DEGRADATION QUANT: CPT | Performed by: EMERGENCY MEDICINE

## 2023-06-24 PROCEDURE — 93010 ELECTROCARDIOGRAM REPORT: CPT | Mod: ,,, | Performed by: INTERNAL MEDICINE

## 2023-06-24 PROCEDURE — 85025 COMPLETE CBC W/AUTO DIFF WBC: CPT | Performed by: EMERGENCY MEDICINE

## 2023-06-24 PROCEDURE — 99285 EMERGENCY DEPT VISIT HI MDM: CPT | Mod: 25

## 2023-06-24 PROCEDURE — 63600175 PHARM REV CODE 636 W HCPCS: Performed by: EMERGENCY MEDICINE

## 2023-06-24 PROCEDURE — 93005 ELECTROCARDIOGRAM TRACING: CPT

## 2023-06-24 PROCEDURE — 83605 ASSAY OF LACTIC ACID: CPT | Performed by: EMERGENCY MEDICINE

## 2023-06-24 PROCEDURE — 83880 ASSAY OF NATRIURETIC PEPTIDE: CPT | Performed by: EMERGENCY MEDICINE

## 2023-06-24 PROCEDURE — 81000 URINALYSIS NONAUTO W/SCOPE: CPT | Performed by: EMERGENCY MEDICINE

## 2023-06-24 PROCEDURE — 96375 TX/PRO/DX INJ NEW DRUG ADDON: CPT

## 2023-06-24 PROCEDURE — 85610 PROTHROMBIN TIME: CPT | Performed by: EMERGENCY MEDICINE

## 2023-06-24 PROCEDURE — 84484 ASSAY OF TROPONIN QUANT: CPT | Mod: 91 | Performed by: EMERGENCY MEDICINE

## 2023-06-24 PROCEDURE — 83690 ASSAY OF LIPASE: CPT | Performed by: EMERGENCY MEDICINE

## 2023-06-24 PROCEDURE — 96374 THER/PROPH/DIAG INJ IV PUSH: CPT

## 2023-06-24 PROCEDURE — 25000003 PHARM REV CODE 250: Performed by: EMERGENCY MEDICINE

## 2023-06-24 PROCEDURE — 93010 EKG 12-LEAD: ICD-10-PCS | Mod: ,,, | Performed by: INTERNAL MEDICINE

## 2023-06-24 RX ORDER — MAG HYDROX/ALUMINUM HYD/SIMETH 200-200-20
5 SUSPENSION, ORAL (FINAL DOSE FORM) ORAL
Status: COMPLETED | OUTPATIENT
Start: 2023-06-24 | End: 2023-06-24

## 2023-06-24 RX ORDER — HYDRALAZINE HYDROCHLORIDE 20 MG/ML
10 INJECTION INTRAMUSCULAR; INTRAVENOUS
Status: COMPLETED | OUTPATIENT
Start: 2023-06-24 | End: 2023-06-24

## 2023-06-24 RX ORDER — FAMOTIDINE 10 MG/ML
20 INJECTION INTRAVENOUS
Status: COMPLETED | OUTPATIENT
Start: 2023-06-24 | End: 2023-06-24

## 2023-06-24 RX ORDER — ONDANSETRON 2 MG/ML
4 INJECTION INTRAMUSCULAR; INTRAVENOUS
Status: COMPLETED | OUTPATIENT
Start: 2023-06-24 | End: 2023-06-24

## 2023-06-24 RX ORDER — SUCRALFATE 1 G/10ML
1 SUSPENSION ORAL
Status: COMPLETED | OUTPATIENT
Start: 2023-06-24 | End: 2023-06-24

## 2023-06-24 RX ORDER — NITROGLYCERIN 0.4 MG/1
0.4 TABLET SUBLINGUAL EVERY 5 MIN PRN
Status: DISCONTINUED | OUTPATIENT
Start: 2023-06-24 | End: 2023-06-25 | Stop reason: HOSPADM

## 2023-06-24 RX ORDER — ASPIRIN 325 MG
325 TABLET ORAL
Status: COMPLETED | OUTPATIENT
Start: 2023-06-24 | End: 2023-06-24

## 2023-06-24 RX ADMIN — SUCRALFATE 1 G: 1 SUSPENSION ORAL at 10:06

## 2023-06-24 RX ADMIN — HYDRALAZINE HYDROCHLORIDE 10 MG: 20 INJECTION INTRAMUSCULAR; INTRAVENOUS at 10:06

## 2023-06-24 RX ADMIN — ALUMINUM HYDROXIDE, MAGNESIUM HYDROXIDE, AND DIMETHICONE 5 ML: 200; 20; 200 SUSPENSION ORAL at 10:06

## 2023-06-24 RX ADMIN — NITROGLYCERIN 1 INCH: 20 OINTMENT TOPICAL at 09:06

## 2023-06-24 RX ADMIN — FAMOTIDINE 20 MG: 10 INJECTION INTRAVENOUS at 10:06

## 2023-06-24 RX ADMIN — ONDANSETRON 4 MG: 2 INJECTION INTRAMUSCULAR; INTRAVENOUS at 10:06

## 2023-06-24 RX ADMIN — ASPIRIN 325 MG ORAL TABLET 325 MG: 325 PILL ORAL at 09:06

## 2023-06-25 VITALS
DIASTOLIC BLOOD PRESSURE: 66 MMHG | RESPIRATION RATE: 23 BRPM | WEIGHT: 181 LBS | SYSTOLIC BLOOD PRESSURE: 150 MMHG | TEMPERATURE: 98 F | HEIGHT: 67 IN | OXYGEN SATURATION: 100 % | BODY MASS INDEX: 28.41 KG/M2 | HEART RATE: 68 BPM

## 2023-06-25 LAB — TROPONIN I SERPL DL<=0.01 NG/ML-MCNC: 0.05 NG/ML (ref 0–0.03)

## 2023-06-25 RX ORDER — METOCLOPRAMIDE 10 MG/1
10 TABLET ORAL EVERY 6 HOURS PRN
Qty: 30 TABLET | Refills: 0 | Status: SHIPPED | OUTPATIENT
Start: 2023-06-25 | End: 2023-07-13 | Stop reason: SDUPTHER

## 2023-06-25 RX ORDER — PANTOPRAZOLE SODIUM 40 MG/1
40 TABLET, DELAYED RELEASE ORAL DAILY
Qty: 30 TABLET | Refills: 0 | Status: SHIPPED | OUTPATIENT
Start: 2023-06-25 | End: 2023-09-20

## 2023-06-25 NOTE — ED PROVIDER NOTES
"Encounter Date: 6/24/2023    SCRIBE #1 NOTE: I, WALDO Rodríguez, am scribing for, and in the presence of,  Kin Rodriguez MD. I have scribed the following portions of the note - Other sections scribed: HPI, ROS, PE.     History     Chief Complaint   Patient presents with    Chest Pain     Pt presents to the ED with c/o intermittent sub-sternal CP that feels like "heaviness" for the last few hours. Took 2 sublingual nitro with no relief, last dose was approx 10 minutes. Hx of stents and CABG x3. Denies n/v or radiation of pain or hx of MI.      Driss Hernandez Jr. is a 72 y.o. male, with a PMHx of HTN, HLD, A Fib, CABG, cardiac stent placement, PAD, and DM, who presents to the ED with chest pain which started ~ 5 hours ago. He described chest pain as "heavy pressure". He also complains of epigastric abdominal pain. Associated symptoms include nausea, vomiting, and SOB. Symptoms started when patient was coming inside after working on a door. However, pt feels this is not a heat related issue. Pt also reports that current symptoms feel different from previous cardiac issues. His last stress test was performed 1 month ago. Cardiologist is Dr. Eze Purdy. Pt received nitroglycerin PTA which provided temporary relief. No other exacerbating or alleviating factors. Denies hematemesis, leg swelling, difficulty urinating, or other associated symptoms. Pt is scheduled for a colonoscopy on 07/05/23. Daily medications include eliquis and clavix.    The history is provided by the patient. No  was used.   Review of patient's allergies indicates:   Allergen Reactions    Penicillins Other (See Comments)     Unknown reaction, Had a reaction as a child    Shrimp Itching     Hand itching     Past Medical History:   Diagnosis Date    Chronic heart failure with preserved ejection fraction 2/9/2023    Chronic midline low back pain without sciatica 10/2/2017    Colon polyps     Coronary artery disease " involving native coronary artery of native heart 3/5/2020    Coronary artery disease involving native coronary artery of native heart with angina pectoris 12/10/2020    Diabetes mellitus with neurological manifestations, uncontrolled 1/24/2017    Diabetic polyneuropathy associated with type 2 diabetes mellitus 1/24/2017    Diabetic polyneuropathy associated with type 2 diabetes mellitus     Essential hypertension 1/24/2017    Gastroesophageal reflux disease 1/24/2017    Hyperlipidemia 1/24/2017    Insomnia 1/24/2017    Long-term insulin use 1/24/2017    Longstanding persistent atrial fibrillation 12/30/2020    Lumbar spondylosis 11/13/2017    Nuclear sclerosis of both eyes 8/24/2017    Obesity     PAD (peripheral artery disease) 3/23/2022    Stage 3 chronic kidney disease 2/13/2019    Uncontrolled type 2 diabetes mellitus without complication, with long-term current use of insulin 1/24/2017     Past Surgical History:   Procedure Laterality Date    ARTHROSCOPIC REPAIR OF ROTATOR CUFF OF SHOULDER Right 7/5/2022    Procedure: REPAIR, ROTATOR CUFF, ARTHROSCOPIC;  Surgeon: Valerie Olivera MD;  Location: [facility] OR;  Service: Orthopedics;  Laterality: Right;  GUADALUPE AND NEPHJENNIFER LEMUS [phone]/ TEXTED ALLAN ON 6/23/2022 @ 9:47AM. ALLAN RESPONDED ON 6/23/2022 @ 10:33AM-  JEAN PAUL TALYA [phone] MY BE HERE FOR CASE SPOKE TO HIM @ 9:59AM ON 7-1-2022  RN PREOP 6/28/2022    BACK SURGERY      2002    CATARACT EXTRACTION W/  INTRAOCULAR LENS IMPLANT Right 08/29/2017    Dr. Hong    CATARACT EXTRACTION W/  INTRAOCULAR LENS IMPLANT Left 02/24/2022    Dr. Hong    COLONOSCOPY N/A 2/21/2017    Procedure: COLONOSCOPY;  Surgeon: Robb Rosado MD;  Location: [facility] ENDO;  Service: Endoscopy;  Laterality: N/A;    CORONARY ANGIOGRAPHY INCLUDING BYPASS GRAFTS WITH CATHETERIZATION OF LEFT HEART N/A 11/11/2020    Procedure: ANGIOGRAM, CORONARY, INCLUDING BYPASS GRAFT, WITH LEFT HEART CATHETERIZATION;  Surgeon: Lane DAMON  MD Polo;  Location: [facility] CATH LAB;  Service: Cardiology;  Laterality: N/A;  RN PRE OP 11-5-2020. --COVID NEGATIVE ON  11-. C A    CORONARY ARTERY BYPASS GRAFT      March 2016    EPIDURAL STEROID INJECTION Bilateral 11/14/2018    Procedure: Lumbar Medial Branch Blocks;  Surgeon: Eze Byrd Jr., MD;  Location: [facility] ENDO;  Service: Pain Management;  Laterality: Bilateral;  Bilateral Lumbar Medial Branch Blocks L2, L3, L4, L5    [id number]  [id number]    Arrive @ 1150; NO Sedation    EPIDURAL STEROID INJECTION Bilateral 11/28/2018    Procedure: Injection, Steroid, Epidural;  Surgeon: Eze Byrd Jr., MD;  Location: [facility] ENDO;  Service: Pain Management;  Laterality: Bilateral;  Bilateral Sacroiliac Joint Steroid Injections    [id number]    Arrive @ 0910    EPIDURAL STEROID INJECTION Bilateral 3/20/2019    Procedure: Injection, Steroid, Sacroiliiac Joint;  Surgeon: Eze Byrd Jr., MD;  Location: [facility] ENDO;  Service: Pain Management;  Laterality: Bilateral;  Bilateral Sacroiliac Joint Steroid Injections    [id number]    Arrive @ 1145; Trigger point injections also?    INTRAOCULAR PROSTHESES INSERTION Left 2/24/2022    Procedure: INSERTION, IOL PROSTHESIS;  Surgeon: Nickolas Hong MD;  Location: [facility] OR;  Service: Ophthalmology;  Laterality: Left;    PHACOEMULSIFICATION OF CATARACT Left 2/24/2022    Procedure: PHACOEMULSIFICATION, CATARACT;  Surgeon: Nickolas Hong MD;  Location: [facility] OR;  Service: Ophthalmology;  Laterality: Left;  RN Phone Pre Op 2-16-22.  Covid NEGATIVE  2-23-22.  Arrival 08:00 am.    TONSILLECTOMY       Family History   Problem Relation Age of Onset    Depression Mother     Heart disease Mother     Stroke Father     No Known Problems Sister     Diabetes Sister     No Known Problems Sister     Blindness Brother     No Known Problems Maternal Aunt     No Known Problems Maternal Uncle     No Known Problems Paternal Aunt     No Known Problems Paternal Uncle     No Known  Problems Maternal Grandmother     No Known Problems Maternal Grandfather     No Known Problems Paternal Grandmother     No Known Problems Paternal Grandfather     Amblyopia Neg Hx     Cancer Neg Hx     Cataracts Neg Hx     Glaucoma Neg Hx     Hypertension Neg Hx     Macular degeneration Neg Hx     Retinal detachment Neg Hx     Strabismus Neg Hx     Thyroid disease Neg Hx      Social History     Tobacco Use    Smoking status: Never     Passive exposure: Never    Smokeless tobacco: Never   Substance Use Topics    Alcohol use: Yes     Comment: rare occassion    Drug use: No     Review of Systems   Constitutional:  Negative for fever.   HENT:  Negative for sore throat.    Eyes:  Negative for visual disturbance.   Respiratory:  Positive for shortness of breath.    Cardiovascular:  Positive for chest pain. Negative for leg swelling.   Gastrointestinal:  Positive for abdominal pain, nausea and vomiting.   Genitourinary:  Negative for difficulty urinating and dysuria.   Musculoskeletal:  Negative for back pain.   Skin:  Negative for rash.   Neurological:  Negative for headaches.     Physical Exam     Initial Vitals [06/24/23 2056]   BP Pulse Resp Temp SpO2   (!) 231/100 (!) 59 16 98.6 °F (37 °C) 100 %      MAP       --         Physical Exam    Nursing note and vitals reviewed.  Constitutional: He appears well-developed and well-nourished.   HENT:   Head: Normocephalic and atraumatic.   Eyes: EOM are normal. Pupils are equal, round, and reactive to light.   Neck: Neck supple. No thyromegaly present. No JVD present.   Normal range of motion.  Cardiovascular:  Normal rate. An irregular rhythm present.     Exam reveals no gallop and no friction rub.       No murmur heard.  Rate of 60. A fib.   Pulmonary/Chest: Breath sounds normal. No respiratory distress.   Abdominal: Abdomen is soft. Bowel sounds are normal. There is abdominal tenderness in the epigastric area.   Musculoskeletal:         General: No tenderness or edema.  Normal range of motion.      Cervical back: Normal range of motion and neck supple.     Neurological: He is alert and oriented to person, place, and time. He has normal strength. GCS score is 15. GCS eye subscore is 4. GCS verbal subscore is 5. GCS motor subscore is 6.   Skin: Skin is warm. Capillary refill takes less than 2 seconds.   Psychiatric: He has a normal mood and affect. His behavior is normal. Thought content normal.       ED Course   Procedures  Labs Reviewed   CBC W/ AUTO DIFFERENTIAL - Abnormal; Notable for the following components:       Result Value    Hemoglobin 10.8 (*)     Hematocrit 37.0 (*)     MCV 69 (*)     MCH 20.1 (*)     MCHC 29.2 (*)     RDW 20.7 (*)     MPV 9.1 (*)     All other components within normal limits   COMPREHENSIVE METABOLIC PANEL - Abnormal; Notable for the following components:    BUN 25 (*)     Alkaline Phosphatase 29 (*)     eGFR 53 (*)     All other components within normal limits   TROPONIN I - Abnormal; Notable for the following components:    Troponin I 0.050 (*)     All other components within normal limits   B-TYPE NATRIURETIC PEPTIDE - Abnormal; Notable for the following components:     (*)     All other components within normal limits   D DIMER, QUANTITATIVE - Abnormal; Notable for the following components:    D-Dimer 0.52 (*)     All other components within normal limits   LIPASE - Abnormal; Notable for the following components:    Lipase 78 (*)     All other components within normal limits   URINALYSIS, REFLEX TO URINE CULTURE - Abnormal; Notable for the following components:    Protein, UA Trace (*)     Glucose, UA 4+ (*)     All other components within normal limits    Narrative:     Specimen Source->Urine   TROPONIN I - Abnormal; Notable for the following components:    Troponin I 0.054 (*)     All other components within normal limits   PROTIME-INR   LACTIC ACID, PLASMA   URINALYSIS MICROSCOPIC    Narrative:     Specimen Source->Urine     EKG Readings:  (Independently Interpreted)   Initial Reading: No STEMI. Rhythm: Atrial Fibrillation. Heart Rate: 55. Ectopy: No Ectopy. Conduction: Normal. ST Segments: Normal ST Segments. T Waves: Normal. Clinical Impression: Normal Sinus Rhythm and Non-specific ST Depression with PVCs     Imaging Results              CT Abdomen Pelvis  Without Contrast (Final result)  Result time 06/25/23 00:23:58      Final result by Billie Carty MD (06/25/23 00:23:58)                   Impression:      No acute findings on CT abdomen and pelvis without contrast.    Hepatic cyst.    Colonic diverticulosis.      Electronically signed by: Billie Carty  Date:    06/25/2023  Time:    00:23               Narrative:    EXAMINATION:  CT ABDOMEN PELVIS WITHOUT    CLINICAL HISTORY:  Complaining of in minute substernal chest pain.  Epigastric abdominal pain.    TECHNIQUE:  5 mm unenhanced axial images from the lung bases through the greater trochanters were performed.  Coronal and sagittal reformatted images were provided.    COMPARISON:  10/29/2018    FINDINGS:  Within the limits of a noncontrast examination, the liver contains a 4.2 rectal absent cyst within the liver.  Spleen, pancreas, kidneys, and adrenal glands are unremarkable.  The gallbladder contains calcification.    There is no gross abdominal adenopathy or ascites. Moderate atherosclerotic changes are present.    There is mild colonic diverticulosis.  There are no pelvic masses or adenopathy.  There is no free pelvic fluid.  There are small fat containing inguinal hernias left greater than right.  Small bilateral inguinal fat containing hernias are present small bilateral fat containing inguinal hernias are present.  There is colonic diverticulosis.    At the lung bases, there is mild bibasilar atelectatic change.                                       X-Ray Chest AP Portable (Final result)  Result time 06/24/23 21:48:30      Final result by Kevin Erickson MD (06/24/23 21:48:30)                    Impression:      Stable cardiomegaly.  No evidence of failure.      Electronically signed by: Kevin Erickson MD  Date:    06/24/2023  Time:    21:48               Narrative:    EXAMINATION:  XR CHEST AP PORTABLE    CLINICAL HISTORY:  Chest Pain;    TECHNIQUE:  Single frontal view of the chest was performed.    COMPARISON:  03/22/2022.    FINDINGS:  Monitoring EKG leads are present.  There are postoperative changes of median sternotomy.  The sternal wires are intact.    The trachea is unremarkable.  The cardiomediastinal silhouette is stable.  There is no evidence of free air beneath the hemidiaphragms.  There are no pleural effusions.  There is no evidence of a pneumothorax.  There is no evidence of pneumomediastinum.  No airspace opacity is present.  There is a stable granuloma in the right lung base.  There are degenerative changes in the osseous structures.                                       Medications   nitroGLYCERIN SL tablet 0.4 mg (has no administration in time range)   aspirin tablet 325 mg (325 mg Oral Given 6/24/23 2117)   nitroGLYCERIN 2% TD oint ointment 1 inch (1 inch Topical (Top) Given 6/24/23 2117)   famotidine (PF) injection 20 mg (20 mg Intravenous Given 6/24/23 2220)   sucralfate 100 mg/mL suspension 1 g (1 g Oral Given 6/24/23 2225)   aluminum-magnesium hydroxide-simethicone 200-200-20 mg/5 mL suspension 5 mL (5 mLs Oral Given 6/24/23 2225)   hydrALAZINE injection 10 mg (10 mg Intravenous Given 6/24/23 2222)   ondansetron injection 4 mg (4 mg Intravenous Given 6/24/23 2220)     Medical Decision Making:   History:   Old Medical Records: I decided to obtain old medical records.  Independently Interpreted Test(s):   I have ordered and independently interpreted EKG Reading(s) - see prior notes  Clinical Tests:   Lab Tests: Ordered and Reviewed  Radiological Study: Ordered and Reviewed  Medical Tests: Ordered and Reviewed   Patient presents complaining of chest pain.  It is actually  more of an epigastric discomfort.  Associated with nausea vomiting.  Nonbloody nonbilious.  EKG shows AFib which is chronic.  Rate controlled.  Troponin minimally elevated but static.  Do not feel this is acute coronary syndrome.  Patient had a recent normal stress test.  Patient received improvement in his symptomatology with GERD treatment.  CT abd pelvis shows no acute abnormalities. Pt's stomach is full. However pt states he has not eaten much at all. No evidence of gastric outlet obstruction. Will treat nausea with reglan. Refill protonix if needed. BP trending downward without evidence of end organ damage. Stable for discharge.      Scribe Attestation:   Scribe #1: I performed the above scribed service and the documentation accurately describes the services I performed. I attest to the accuracy of the note.                 I, Kin Rodriguez, personally performed the services described in this documentation.  All medical record entries made by the scribe were at my direction and in my presence.  I have reviewed the chart and agree that the record reflects my personal performance and is accurate and complete.  Clinical Impression:   Final diagnoses:  [R07.9] Chest pain  [R10.13] Epigastric abdominal pain (Primary)        ED Disposition Condition    Discharge Stable          ED Prescriptions       Medication Sig Dispense Start Date End Date Auth. Provider    metoclopramide HCl (REGLAN) 10 MG tablet Take 1 tablet (10 mg total) by mouth every 6 (six) hours as needed (Nausea). 30 tablet 6/25/2023 -- Kin Rodriguez MD    pantoprazole (PROTONIX) 40 MG tablet Take 1 tablet (40 mg total) by mouth once daily. 30 tablet 6/25/2023 6/24/2024 Kin Rodriguez MD          Follow-up Information       Follow up With Specialties Details Why Contact Info    Jono Willoughby MD Internal Medicine In 2 days if not resolved. [address] [facility]  [place] [address]  [phone]      [facility] - Emergency Dept Emergency  Medicine  As needed [address] [place], [place] [address] [address]  [phone]             Kin Rodriguez MD  06/25/23 0058

## 2023-06-26 ENCOUNTER — PATIENT OUTREACH (OUTPATIENT)
Dept: EMERGENCY MEDICINE | Facility: HOSPITAL | Age: 73
End: 2023-06-26

## 2023-06-27 ENCOUNTER — TELEPHONE (OUTPATIENT)
Dept: FAMILY MEDICINE | Facility: CLINIC | Age: 73
End: 2023-06-27
Payer: MEDICARE

## 2023-06-27 NOTE — TELEPHONE ENCOUNTER
Pt called to inform Dr. Willoughby of ER visit. Stated he was put on Reglan and feels the meds aren't working but he hasn't been taking meds every 6 hours like instructed. Stated er told him it was a gastro issue he was having.

## 2023-06-27 NOTE — TELEPHONE ENCOUNTER
----- Message from Chaya Hurtado sent at 6/27/2023  9:54 AM CDT -----  .Type: Patient Call Back    Who called: wife    What is the request in detail:patient was seen at the Er this past Saturday and would like a call to discuss    Can the clinic reply by [facility]? call    Would the patient rather a call back or a response via [facility]? call    Best call back number:.[phone]     Additional Information:

## 2023-06-28 NOTE — TELEPHONE ENCOUNTER
Ask patient if he is active on the portal or uses the portal since it is apparently active.  Might be a family member.      Otherwise reassure that Dr. Willoughby did review and it looks like the stomach was full on the CT which might suggest a stomach that is not working.  The Reglan was to trying get the stomach moving but the Ozempic shots definitely can cause this.    Dr. Willoughby does see you have a upper scope set for next week with Dr. Varela and that is a good idea but all of this could be the Ozempic if having more reflux lately.

## 2023-06-29 NOTE — TELEPHONE ENCOUNTER
Message delivered from provider and pt states that the colonoscopy was already scheduled because he told him to do it and that he was still not feeling well.

## 2023-07-05 ENCOUNTER — ANESTHESIA EVENT (OUTPATIENT)
Dept: ENDOSCOPY | Facility: HOSPITAL | Age: 73
End: 2023-07-05
Payer: MEDICARE

## 2023-07-05 ENCOUNTER — HOSPITAL ENCOUNTER (OUTPATIENT)
Facility: HOSPITAL | Age: 73
Discharge: HOME | End: 2023-07-05
Attending: INTERNAL MEDICINE | Admitting: INTERNAL MEDICINE
Payer: MEDICARE

## 2023-07-05 ENCOUNTER — ANESTHESIA (OUTPATIENT)
Dept: ENDOSCOPY | Facility: HOSPITAL | Age: 73
End: 2023-07-05
Payer: MEDICARE

## 2023-07-05 VITALS
RESPIRATION RATE: 20 BRPM | HEART RATE: 57 BPM | SYSTOLIC BLOOD PRESSURE: 141 MMHG | DIASTOLIC BLOOD PRESSURE: 65 MMHG | OXYGEN SATURATION: 99 % | TEMPERATURE: 98 F

## 2023-07-05 DIAGNOSIS — Z12.11 COLON CANCER SCREENING: ICD-10-CM

## 2023-07-05 LAB
GLUCOSE SERPL-MCNC: 59 MG/DL (ref 70–110)
POCT GLUCOSE: 51 MG/DL (ref 70–110)
POCT GLUCOSE: 59 MG/DL (ref 70–110)
POCT GLUCOSE: 59 MG/DL (ref 70–110)

## 2023-07-05 PROCEDURE — 25000003 PHARM REV CODE 250: Performed by: INTERNAL MEDICINE

## 2023-07-05 PROCEDURE — D9220A PRA ANESTHESIA: ICD-10-PCS | Mod: PT,ANES,, | Performed by: ANESTHESIOLOGY

## 2023-07-05 PROCEDURE — 88305 TISSUE EXAM BY PATHOLOGIST: CPT | Mod: 26,,, | Performed by: PATHOLOGY

## 2023-07-05 PROCEDURE — 45385 COLONOSCOPY W/LESION REMOVAL: CPT | Mod: PT,,, | Performed by: INTERNAL MEDICINE

## 2023-07-05 PROCEDURE — 88305 TISSUE EXAM BY PATHOLOGIST: ICD-10-PCS | Mod: 26,,, | Performed by: PATHOLOGY

## 2023-07-05 PROCEDURE — 63600175 PHARM REV CODE 636 W HCPCS: Performed by: NURSE ANESTHETIST, CERTIFIED REGISTERED

## 2023-07-05 PROCEDURE — 82962 GLUCOSE BLOOD TEST: CPT | Performed by: INTERNAL MEDICINE

## 2023-07-05 PROCEDURE — 88305 TISSUE EXAM BY PATHOLOGIST: CPT | Performed by: PATHOLOGY

## 2023-07-05 PROCEDURE — 37000008 HC ANESTHESIA 1ST 15 MINUTES: Performed by: INTERNAL MEDICINE

## 2023-07-05 PROCEDURE — 45385 PR COLONOSCOPY,REMV LESN,SNARE: ICD-10-PCS | Mod: PT,,, | Performed by: INTERNAL MEDICINE

## 2023-07-05 PROCEDURE — 25000003 PHARM REV CODE 250: Performed by: NURSE ANESTHETIST, CERTIFIED REGISTERED

## 2023-07-05 PROCEDURE — 27201089 HC SNARE, DISP (ANY): Performed by: INTERNAL MEDICINE

## 2023-07-05 PROCEDURE — D9220A PRA ANESTHESIA: ICD-10-PCS | Mod: PT,CRNA,, | Performed by: NURSE ANESTHETIST, CERTIFIED REGISTERED

## 2023-07-05 PROCEDURE — 37000009 HC ANESTHESIA EA ADD 15 MINS: Performed by: INTERNAL MEDICINE

## 2023-07-05 PROCEDURE — 45385 COLONOSCOPY W/LESION REMOVAL: CPT | Mod: PT | Performed by: INTERNAL MEDICINE

## 2023-07-05 PROCEDURE — D9220A PRA ANESTHESIA: Mod: PT,ANES,, | Performed by: ANESTHESIOLOGY

## 2023-07-05 PROCEDURE — D9220A PRA ANESTHESIA: Mod: PT,CRNA,, | Performed by: NURSE ANESTHETIST, CERTIFIED REGISTERED

## 2023-07-05 RX ORDER — LIDOCAINE HYDROCHLORIDE 10 MG/ML
INJECTION, SOLUTION EPIDURAL; INFILTRATION; INTRACAUDAL; PERINEURAL
Status: DISCONTINUED
Start: 2023-07-05 | End: 2023-07-05 | Stop reason: HOSPADM

## 2023-07-05 RX ORDER — PROPOFOL 10 MG/ML
INJECTION, EMULSION INTRAVENOUS
Status: DISCONTINUED
Start: 2023-07-05 | End: 2023-07-05 | Stop reason: HOSPADM

## 2023-07-05 RX ORDER — PROPOFOL 10 MG/ML
VIAL (ML) INTRAVENOUS
Status: DISCONTINUED | OUTPATIENT
Start: 2023-07-05 | End: 2023-07-05

## 2023-07-05 RX ORDER — SODIUM CHLORIDE 9 MG/ML
INJECTION, SOLUTION INTRAVENOUS CONTINUOUS
Status: DISCONTINUED | OUTPATIENT
Start: 2023-07-05 | End: 2023-07-05 | Stop reason: HOSPADM

## 2023-07-05 RX ORDER — LIDOCAINE HYDROCHLORIDE 20 MG/ML
INJECTION INTRAVENOUS
Status: DISCONTINUED | OUTPATIENT
Start: 2023-07-05 | End: 2023-07-05

## 2023-07-05 RX ORDER — SODIUM CHLORIDE 9 MG/ML
INJECTION, SOLUTION INTRAVENOUS CONTINUOUS
Status: CANCELLED | OUTPATIENT
Start: 2023-07-05

## 2023-07-05 RX ADMIN — PROPOFOL 90 MG: 10 INJECTION, EMULSION INTRAVENOUS at 02:07

## 2023-07-05 RX ADMIN — PROPOFOL 30 MG: 10 INJECTION, EMULSION INTRAVENOUS at 02:07

## 2023-07-05 RX ADMIN — LIDOCAINE HYDROCHLORIDE 20 MG: 20 INJECTION, SOLUTION INTRAVENOUS at 02:07

## 2023-07-05 RX ADMIN — SODIUM CHLORIDE: 0.9 INJECTION, SOLUTION INTRAVENOUS at 01:07

## 2023-07-05 RX ADMIN — SODIUM CHLORIDE: 0.9 INJECTION, SOLUTION INTRAVENOUS at 02:07

## 2023-07-05 RX ADMIN — LIDOCAINE HYDROCHLORIDE 50 MG: 20 INJECTION, SOLUTION INTRAVENOUS at 02:07

## 2023-07-05 NOTE — ANESTHESIA PREPROCEDURE EVALUATION
07/05/2023  Driss Hernandez Jr. is a 72 y.o., male.  To undergo Procedure(s) (LRB):  COLONOSCOPY (N/A)     Denies CP/SOB/GERD/MI/CVA/URI symptoms.  METS > 4  NPO > 8    Past Medical History:  Past Medical History:   Diagnosis Date    Chronic heart failure with preserved ejection fraction 2/9/2023    Chronic midline low back pain without sciatica 10/2/2017    Colon polyps     Coronary artery disease involving native coronary artery of native heart 3/5/2020    Coronary artery disease involving native coronary artery of native heart with angina pectoris 12/10/2020    Diabetes mellitus with neurological manifestations, uncontrolled 1/24/2017    Diabetic polyneuropathy associated with type 2 diabetes mellitus 1/24/2017    Diabetic polyneuropathy associated with type 2 diabetes mellitus     Essential hypertension 1/24/2017    Gastroesophageal reflux disease 1/24/2017    Hyperlipidemia 1/24/2017    Insomnia 1/24/2017    Long-term insulin use 1/24/2017    Longstanding persistent atrial fibrillation 12/30/2020    Lumbar spondylosis 11/13/2017    Nuclear sclerosis of both eyes 8/24/2017    Obesity     PAD (peripheral artery disease) 3/23/2022    Stage 3 chronic kidney disease 2/13/2019    Uncontrolled type 2 diabetes mellitus without complication, with long-term current use of insulin 1/24/2017       Past Surgical History:  Past Surgical History:   Procedure Laterality Date    ARTHROSCOPIC REPAIR OF ROTATOR CUFF OF SHOULDER Right 7/5/2022    Procedure: REPAIR, ROTATOR CUFF, ARTHROSCOPIC;  Surgeon: Valerie Olivera MD;  Location: [facility];  Service: Orthopedics;  Laterality: Right;  GUADALUPE AND NEPHJENNIFER LEMUS [phone]/ TEXTED ALLAN ON 6/23/2022 @ 9:47AM. ALLAN RESPONDED ON 6/23/2022 @ 10:33AM-BREANNA BABIN [phone] MY BE HERE FOR CASE SPOKE TO HIM @ 9:59AM ON 7-1-2022  RN PREOP  6/28/2022    BACK SURGERY      2002    CATARACT EXTRACTION W/  INTRAOCULAR LENS IMPLANT Right 08/29/2017    Dr. Hong    CATARACT EXTRACTION W/  INTRAOCULAR LENS IMPLANT Left 02/24/2022    Dr. Hong    COLONOSCOPY N/A 2/21/2017    Procedure: COLONOSCOPY;  Surgeon: Robb Rosado MD;  Location: [facility] ENDO;  Service: Endoscopy;  Laterality: N/A;    CORONARY ANGIOGRAPHY INCLUDING BYPASS GRAFTS WITH CATHETERIZATION OF LEFT HEART N/A 11/11/2020    Procedure: ANGIOGRAM, CORONARY, INCLUDING BYPASS GRAFT, WITH LEFT HEART CATHETERIZATION;  Surgeon: Lane Cuellar MD;  Location: [facility] CATH LAB;  Service: Cardiology;  Laterality: N/A;  RN PRE OP 11-5-2020. --COVID NEGATIVE ON  11-. C A    CORONARY ARTERY BYPASS GRAFT      March 2016    EPIDURAL STEROID INJECTION Bilateral 11/14/2018    Procedure: Lumbar Medial Branch Blocks;  Surgeon: Eze Byrd Jr., MD;  Location: [facility] ENDO;  Service: Pain Management;  Laterality: Bilateral;  Bilateral Lumbar Medial Branch Blocks L2, L3, L4, L5    [id number]  59905    Arrive @ 1150; NO Sedation    EPIDURAL STEROID INJECTION Bilateral 11/28/2018    Procedure: Injection, Steroid, Epidural;  Surgeon: Eze Byrd Jr., MD;  Location: [facility] ENDO;  Service: Pain Management;  Laterality: Bilateral;  Bilateral Sacroiliac Joint Steroid Injections    [id number]    Arrive @ 0910    EPIDURAL STEROID INJECTION Bilateral 3/20/2019    Procedure: Injection, Steroid, Sacroiliiac Joint;  Surgeon: Eze Byrd Jr., MD;  Location: [facility] ENDO;  Service: Pain Management;  Laterality: Bilateral;  Bilateral Sacroiliac Joint Steroid Injections    [id number]    Arrive @ 1145; Trigger point injections also?    INTRAOCULAR PROSTHESES INSERTION Left 2/24/2022    Procedure: INSERTION, IOL PROSTHESIS;  Surgeon: Nickolas Hong MD;  Location: [facility] OR;  Service: Ophthalmology;  Laterality: Left;    PHACOEMULSIFICATION OF CATARACT Left 2/24/2022    Procedure: PHACOEMULSIFICATION,  CATARACT;  Surgeon: Nickolas Hong MD;  Location: [facility];  Service: Ophthalmology;  Laterality: Left;  RN Phone Pre Op 2-16-22.  Covid NEGATIVE  2-23-22.  Arrival 08:00 am.    TONSILLECTOMY         Social History:  Social History     Socioeconomic History    Marital status:    Tobacco Use    Smoking status: Never     Passive exposure: Never    Smokeless tobacco: Never   Substance and Sexual Activity    Alcohol use: Yes     Comment: rare occassion    Drug use: No    Sexual activity: Yes     Partners: Female     Social Determinants of Health     Financial Resource Strain: Low Risk     Difficulty of Paying Living Expenses: Not hard at all   Food Insecurity: No Food Insecurity    Worried About Running Out of Food in the Last Year: Never true    Ran Out of Food in the Last Year: Never true   Transportation Needs: No Transportation Needs    Lack of Transportation (Medical): No    Lack of Transportation (Non-Medical): No   Physical Activity: Inactive    Days of Exercise per Week: 0 days    Minutes of Exercise per Session: 0 min   Stress: Stress Concern Present    Feeling of Stress : Rather much   Social Connections: Moderately Integrated    Frequency of Communication with Friends and Family: More than three times a week    Frequency of Social Gatherings with Friends and Family: Three times a week    Attends Sabianist Services: Never    Active Member of Clubs or Organizations: Yes    Attends Club or Organization Meetings: More than 4 times per year    Marital Status:    Housing Stability: Low Risk     Unable to Pay for Housing in the Last Year: No    Number of Places Lived in the Last Year: 1    Unstable Housing in the Last Year: No       Medications:  Current Facility-Administered Medications on File Prior to Encounter   Medication Dose Route Frequency Provider Last Rate Last Admin    cyclopentolate 1% ophthalmic solution 1 drop  1 drop Left Eye On Call Procedure Nickolas MARC  "MD Gely   1 drop at 02/24/22 0901    ofloxacin 0.3 % ophthalmic solution 1 drop  1 drop Left Eye On Call Procedure Nickolas Hong MD   2 drop at 02/24/22 1017    sodium chloride 0.9% flush 10 mL  10 mL Intravenous PRN Nickolas Hong MD        triamcinolone acetonide injection 40 mg  40 mg Intra-articular  Valerie Olivera MD   40 mg at 02/09/23 1030     Current Outpatient Medications on File Prior to Encounter   Medication Sig Dispense Refill    albuterol (PROVENTIL/VENTOLIN HFA) 90 mcg/actuation inhaler Inhale 2 puffs into the lungs every 4 (four) hours as needed for Shortness of Breath. Rescue 17 g 3    ascorbic acid, vitamin C, (VITAMIN C) 100 MG tablet Take 100 mg by mouth daily as needed.      atorvastatin (LIPITOR) 80 MG tablet Take 1 tablet (80 mg total) by mouth every evening. 90 tablet 3    b complex vitamins tablet Take 1 tablet by mouth once daily.      BD ALCOHOL SWABS PadM       BD ULTRA-FINE ROLAND PEN NEEDLES 32 gauge x 5/32" [facility]       blood sugar diagnostic Strp Check blood glucose 2 times a day. True metrix. E11.65 200 strip 3    blood-glucose meter kit Test glucose 2-3x/day. True metrix 1 each 0    celecoxib (CELEBREX) 100 MG capsule TAKE 1 CAPSULE(100 MG) BY MOUTH EVERY DAY 30 capsule 6    clopidogreL (PLAVIX) 75 mg tablet TAKE 1 TABLET(75 MG) BY MOUTH EVERY DAY 90 tablet 3    coenzyme Q10 100 mg capsule Take 100 mg by mouth once daily.      dapagliflozin (FARXIGA) 5 mg Tab tablet Take 1 tablet (5 mg total) by mouth once daily. 30 tablet 3    ELIQUIS 5 mg Tab TAKE 1 TABLET TWICE DAILY 180 tablet 3    ergocalciferol (VITAMIN D2) 50,000 unit Cap TAKE ONE CAPSULE BY MOUTH WEEKLY 12 capsule 2    fenofibrate 160 MG Tab TAKE 1 TABLET EVERY MORNING 90 tablet 1    furosemide (LASIX) 20 MG tablet TAKE 1 TABLET TWICE DAILY 180 tablet 3    gabapentin (NEURONTIN) 100 MG capsule TAKE 2 CAPSULES EVERY MORNING AND TAKE 3 CAPSULES EVERY DAY WITH DINNER 450 capsule 12 "    glimepiride (AMARYL) 2 MG tablet TAKE 1 TABLET EVERY DAY BEFORE BREAKFAST 90 tablet 2    hydrALAZINE (APRESOLINE) 50 MG tablet Take 1 tablet (50 mg total) by mouth 2 (two) times a day. 180 tablet 3    HYDROcodone-acetaminophen (NORCO) 5-325 mg per tablet Take 1 tablet by mouth every 8 (eight) hours as needed for Pain. 15 tablet 0    insulin degludec (TRESIBA FLEXTOUCH U-100) 100 unit/mL (3 mL) insulin pen Inject 20 Units into the skin once daily. 10 pen 4    isosorbide mononitrate (IMDUR) 60 MG 24 hr tablet Take 1 tablet (60 mg total) by mouth once daily. 90 tablet 11    ketoconazole (NIZORAL) 2 % cream Apply topically once daily. 60 g 6    lancets Misc Check blood glucose 2x/day. True metrix. E11.65 200 each 3    magnesium 250 mg Tab Take 1 tablet by mouth once daily.       metFORMIN (GLUCOPHAGE-XR) 500 MG ER 24hr tablet Take 1 tablet with breakfast and 2 tablets with supper. 270 tablet 2    nitroGLYCERIN (NITROSTAT) 0.4 MG SL tablet Place 1 tablet (0.4 mg total) under the tongue every 5 (five) minutes as needed for Chest pain. 25 tablet 11    omega-3 acid ethyl esters (LOVAZA) 1 gram capsule Take 2 capsules (2 g total) by mouth 2 (two) times daily. 360 capsule 3    ondansetron (ZOFRAN-ODT) 4 MG TbDL Dissolve 1 tablet (4 mg total) by mouth every 6 (six) hours as needed (nausea). 10 tablet 0    pantoprazole (PROTONIX) 40 MG tablet TAKE 1 TABLET EVERY DAY 90 tablet 3    semaglutide (OZEMPIC) 2 mg/dose (8 mg/3 mL) PnIj Inject 2 mg into the skin every 7 days. 4 each 3    semaglutide (OZEMPIC) 2 mg/dose (8 mg/3 mL) PnIj Inject 2 mg into the skin every 7 days. 1 each 0    tiZANidine (ZANAFLEX) 4 MG tablet Take 4 mg by mouth every 6 (six) hours as needed.      triazolam (HALCION) 0.25 MG Tab TAKE 1 TABLET BY MOUTH EVERY NIGHT AT BEDTIME AS NEEDED 30 tablet 5    TRUEPLUS LANCETS 33 gauge Misc          Allergies:  Review of patient's allergies indicates:   Allergen Reactions    Penicillins Other (See  Comments)     Unknown reaction, Had a reaction as a child    Shrimp Itching     Hand itching       Active Problems:  Patient Active Problem List   Diagnosis    DM type 2 without retinopathy    Diabetes mellitus with neurological manifestations, uncontrolled    Insomnia    Gastroesophageal reflux disease    Long-term insulin use    Mixed hyperlipidemia    Essential hypertension    Diabetic polyneuropathy associated with type 2 diabetes mellitus    Chronic right shoulder pain    Decreased strength of upper extremity    Decreased range of motion (ROM) of shoulder    Posture imbalance    Nuclear sclerosis, left    Posterior subcapsular age-related cataract of both eyes    Refractive error    Senile nuclear sclerosis    Chronic midline low back pain without sciatica    Lumbar spondylosis    DDD (degenerative disc disease), lumbar    Postlaminectomy syndrome of lumbar region    Lumbar radiculopathy, right    Lumbar radiculopathy    Pseudophakia    Obesity (BMI 30.0-34.9)    Lumbosacral spondylosis    Bilateral sacroiliitis    Stage 3a chronic kidney disease    Diabetes mellitus due to underlying condition with stage 3 chronic kidney disease, with long-term current use of insulin    Chest pain, atypical    Hx of CABG    Coronary artery disease involving native coronary artery of native heart    Aortic atherosclerosis    Sedative dependence    Pre-op testing    Coronary artery disease involving coronary bypass graft of native heart with unstable angina pectoris    Longstanding persistent atrial fibrillation    Long term (current) use of anticoagulants    Cervical radiculopathy    Cervical spondylosis    DDD (degenerative disc disease), cervical    Weakness of shoulder    Winging of scapula    Impaired mobility and ADLs    Nuclear sclerotic cataract of left eye    Atherosclerosis of both carotid arteries    PAD (peripheral artery disease)    History of percutaneous coronary  intervention    Type 2 diabetes mellitus with hyperglycemia, with long-term current use of insulin    Nontraumatic incomplete tear of left rotator cuff    Decreased range of motion of right shoulder    Impaired strength of shoulder muscles    Chronic heart failure with preserved ejection fraction    Atherosclerosis of coronary artery bypass graft with angina pectoris, unspecified whether native or transplanted heart       Diagnostic Studies:   Latest Reference Range & Units 06/24/23 21:18   WBC 3.90 - 12.70 K/uL 7.09   RBC 4.60 - 6.20 M/uL 5.37   Hemoglobin 14.0 - 18.0 g/dL 10.8 (L)   Hematocrit 40.0 - 54.0 % 37.0 (L)   MCV 82 - 98 fL 69 (L)   MCH 27.0 - 31.0 pg 20.1 (L)   MCHC 32.0 - 36.0 g/dL 29.2 (L)   RDW 11.5 - 14.5 % 20.7 (H)   Platelets 150 - 450 K/uL 245   MPV 9.2 - 12.9 fL 9.1 (L)   Gran % 38.0 - 73.0 % 63.3   Lymph % 18.0 - 48.0 % 21.7   Mono % 4.0 - 15.0 % 9.3   Eosinophil % 0.0 - 8.0 % 5.1   Basophil % 0.0 - 1.9 % 0.3   Immature Granulocytes 0.0 - 0.5 % 0.3   Gran # (ANC) 1.8 - 7.7 K/uL 4.5   Lymph # 1.0 - 4.8 K/uL 1.5   Mono # 0.3 - 1.0 K/uL 0.7   Eos # 0.0 - 0.5 K/uL 0.4   Baso # 0.00 - 0.20 K/uL 0.02   Immature Grans (Abs) 0.00 - 0.04 K/uL 0.02   nRBC 0 /100 WBC 0   Differential Method  Automated      Latest Reference Range & Units 06/24/23 21:18   Sodium 136 - 145 mmol/L 140   Potassium 3.5 - 5.1 mmol/L 4.2   Chloride 95 - 110 mmol/L 107   CO2 23 - 29 mmol/L 23   Anion Gap 8 - 16 mmol/L 10   BUN 8 - 23 mg/dL 25 (H)   Creatinine 0.5 - 1.4 mg/dL 1.4   eGFR >60 mL/min/1.73 m^2 53 !     EKG (6/24/23):  Atrial fibrillation with slow ventricular response with premature   ventricular or aberrantly conducted complexes   Rightward axis   ST and T wave abnormality, consider inferior ischemia     Nuclear Stress (3/13/23):    The patient exercised for 8 minutes 16 seconds on a Marcial protocol, corresponding to a functional capacity of 9 METS, achieving a peak heart rate of 127 bpm, which is 86 % of the age  predicted maximum heart rate. Their exercise capacity was above average.    Normal myocardial perfusion scan. There is no evidence of myocardial ischemia or infarction.    There is a mild to moderate intensity perfusion abnormality in the anteroseptal wall of the left ventricle consistent with the RV insertion site.    The gated perfusion images showed an ejection fraction of 66% post stress.    There is normal wall motion post stress.    The ECG portion of the study is negative for ischemia.    The patient reported no chest pain during the stress test.    During stress, rare PVCs are noted.    The exercise capacity was above average.    TTE (3/13/23):   The left ventricle is normal in size with concentric hypertrophy and normal systolic function.   The estimated ejection fraction is 55%.   Normal left ventricular diastolic function.   Normal right ventricular size with normal right ventricular systolic function.   Moderate left atrial enlargement.   Mild right atrial enlargement.   Normal central venous pressure (3 mmHg).   The estimated PA systolic pressure is 11 mmHg.   There is no pulmonary hypertension.    Cath (12/10/20):  1.  90% mid OM2 stenosis.  2.  Successful PCI with placement of a 2.25 x 26 mm drug-eluting stent reducing the 90% stenosis to 0%.  INGRIS 3 flow.  No dissection.  Good angiographic results.    24 Hour Vitals:      See Nursing Charting For Additional Vitals      Pre-op Assessment    I have reviewed the Patient Summary Reports.     I have reviewed the Nursing Notes.       Review of Systems  Anesthesia Hx:  No problems with previous Anesthesia   Denies Personal Hx of Anesthesia complications.   Social:  Non-Smoker, Social Alcohol Use    Cardiovascular:   Exercise tolerance: good Hypertension CAD  CABG/stent Dysrhythmias atrial fibrillation CHF PVD hyperlipidemia ECG has been reviewed. Plavix/Eliquis   Pulmonary:  Pulmonary Normal    Renal/:   Chronic Renal Disease, CKD     Hepatic/GI:  Hepatic/GI Normal    Musculoskeletal:  Spine Disorders: lumbar Degenerative disease and Disc disease    Neurological:  Neurology Normal    Endocrine:   Diabetes ozempic q 7 days       Physical Exam  General: Well nourished and Cooperative    Airway:  Mallampati: III   Mouth Opening: Normal  TM Distance: Normal      Dental:  Intact    Chest/Lungs:  Clear to auscultation, Normal Respiratory Rate    Heart:  Rate: Normal  Rhythm: Regular Rhythm        Anesthesia Plan  Type of Anesthesia, risks & benefits discussed:    Anesthesia Type: Gen Natural Airway, MAC, Gen ETT  Intra-op Monitoring Plan: Standard ASA Monitors  Post Op Pain Control Plan: multimodal analgesia  Induction:  IV  Informed Consent: Informed consent signed with the Patient and all parties understand the risks and agree with anesthesia plan.  All questions answered.   ASA Score: 3    Ready For Surgery From Anesthesia Perspective.     .

## 2023-07-05 NOTE — PLAN OF CARE
Procedure and recovery complete. Awake and alert. No c/o dizziness or weakness. Skin warm and dry. CBG 59. 480 cc orange juice and 2 packs of grham crackers PO. Tolerated well. Wife at bedside. Discharge instructions given. Verbalized understanding. No acute distress noted.

## 2023-07-05 NOTE — H&P
[facility] Endoscopy  Gastroenterology  H&P    Patient Name: Driss Hernandez Jr.  MRN: [id number]  Admission Date: 7/5/2023  Code Status: Prior    Attending Provider: kayla [email or web address]  Primary Care Physician: Jono Willoughby MD  Principal Problem:<principal problem not specified>    Subjective:     History of Present Illness: prior colon polyps    Past Medical History:   Diagnosis Date    Chronic heart failure with preserved ejection fraction 2/9/2023    Chronic midline low back pain without sciatica 10/2/2017    Colon polyps     Coronary artery disease involving native coronary artery of native heart 3/5/2020    Coronary artery disease involving native coronary artery of native heart with angina pectoris 12/10/2020    Diabetes mellitus with neurological manifestations, uncontrolled 1/24/2017    Diabetic polyneuropathy associated with type 2 diabetes mellitus 1/24/2017    Diabetic polyneuropathy associated with type 2 diabetes mellitus     Essential hypertension 1/24/2017    Gastroesophageal reflux disease 1/24/2017    Hyperlipidemia 1/24/2017    Insomnia 1/24/2017    Long-term insulin use 1/24/2017    Longstanding persistent atrial fibrillation 12/30/2020    Lumbar spondylosis 11/13/2017    Nuclear sclerosis of both eyes 8/24/2017    Obesity     PAD (peripheral artery disease) 3/23/2022    Stage 3 chronic kidney disease 2/13/2019    Uncontrolled type 2 diabetes mellitus without complication, with long-term current use of insulin 1/24/2017       Past Surgical History:   Procedure Laterality Date    ARTHROSCOPIC REPAIR OF ROTATOR CUFF OF SHOULDER Right 7/5/2022    Procedure: REPAIR, ROTATOR CUFF, ARTHROSCOPIC;  Surgeon: Valerie Olivera MD;  Location: [facility];  Service: Orthopedics;  Laterality: Right;  MCCLURE AND SAADIA LEMUS [phone]/ TEXTED ALLAN ON 6/23/2022 @ 9:47AM. ALLAN RESPONDED ON 6/23/2022 @ 10:33AM-LO  JEAN PAUL BABIN [phone] MY BE HERE FOR CASE SPOKE TO HIM @ 9:59AM ON 7-1-2022  RN PREOP  6/28/2022    BACK SURGERY      2002    CATARACT EXTRACTION W/  INTRAOCULAR LENS IMPLANT Right 08/29/2017    Dr. Hong    CATARACT EXTRACTION W/  INTRAOCULAR LENS IMPLANT Left 02/24/2022    Dr. Hong    COLONOSCOPY N/A 2/21/2017    Procedure: COLONOSCOPY;  Surgeon: Robb Rosado MD;  Location: [facility] ENDO;  Service: Endoscopy;  Laterality: N/A;    CORONARY ANGIOGRAPHY INCLUDING BYPASS GRAFTS WITH CATHETERIZATION OF LEFT HEART N/A 11/11/2020    Procedure: ANGIOGRAM, CORONARY, INCLUDING BYPASS GRAFT, WITH LEFT HEART CATHETERIZATION;  Surgeon: Lane Cuellar MD;  Location: [facility] CATH LAB;  Service: Cardiology;  Laterality: N/A;  RN PRE OP 11-5-2020. --COVID NEGATIVE ON  11-. C A    CORONARY ARTERY BYPASS GRAFT      March 2016    EPIDURAL STEROID INJECTION Bilateral 11/14/2018    Procedure: Lumbar Medial Branch Blocks;  Surgeon: Eze Byrd Jr., MD;  Location: [facility] ENDO;  Service: Pain Management;  Laterality: Bilateral;  Bilateral Lumbar Medial Branch Blocks L2, L3, L4, L5    [id number]  32101    Arrive @ 1150; NO Sedation    EPIDURAL STEROID INJECTION Bilateral 11/28/2018    Procedure: Injection, Steroid, Epidural;  Surgeon: Eze Byrd Jr., MD;  Location: [facility] ENDO;  Service: Pain Management;  Laterality: Bilateral;  Bilateral Sacroiliac Joint Steroid Injections    [id number]    Arrive @ 0910    EPIDURAL STEROID INJECTION Bilateral 3/20/2019    Procedure: Injection, Steroid, Sacroiliiac Joint;  Surgeon: Eze Byrd Jr., MD;  Location: [facility] ENDO;  Service: Pain Management;  Laterality: Bilateral;  Bilateral Sacroiliac Joint Steroid Injections    [id number]    Arrive @ 1145; Trigger point injections also?    INTRAOCULAR PROSTHESES INSERTION Left 2/24/2022    Procedure: INSERTION, IOL PROSTHESIS;  Surgeon: Nickolas Hong MD;  Location: [facility] OR;  Service: Ophthalmology;  Laterality: Left;    PHACOEMULSIFICATION OF CATARACT Left 2/24/2022    Procedure: PHACOEMULSIFICATION, CATARACT;   Surgeon: Nickolas Hong MD;  Location: [facility];  Service: Ophthalmology;  Laterality: Left;  RN Phone Pre Op 2-16-22.  Covid NEGATIVE  2-23-22.  Arrival 08:00 am.    TONSILLECTOMY         Review of patient's allergies indicates:   Allergen Reactions    Penicillins Other (See Comments)     Unknown reaction, Had a reaction as a child    Shrimp Itching     Hand itching     Family History       Problem Relation (Age of Onset)    Blindness Brother    Depression Mother    Diabetes Sister    Heart disease Mother    No Known Problems Sister, Sister, Maternal Aunt, Maternal Uncle, Paternal Aunt, Paternal Uncle, Maternal Grandmother, Maternal Grandfather, Paternal Grandmother, Paternal Grandfather    Stroke Father          Tobacco Use    Smoking status: Never     Passive exposure: Never    Smokeless tobacco: Never   Substance and Sexual Activity    Alcohol use: Yes     Comment: rare occassion    Drug use: No    Sexual activity: Yes     Partners: Female     Review of Systems   Respiratory: Negative.     Cardiovascular: Negative.    Objective:     Vital Signs (Most Recent):  Temp: 98 °F (36.7 °C) (07/05/23 1330)  Pulse: (!) 54 (07/05/23 1330)  Resp: 20 (07/05/23 1330)  BP: (!) 144/58 (07/05/23 1330)  SpO2: 98 % (07/05/23 1330) Vital Signs (24h Range):  Temp:  [98 °F (36.7 °C)] 98 °F (36.7 °C)  Pulse:  [54] 54  Resp:  [20] 20  SpO2:  [98 %] 98 %  BP: (144)/(58) 144/58        There is no height or weight on file to calculate BMI.    No intake or output data in the 24 hours ending 07/05/23 1355    Lines/Drains/Airways       Peripheral Intravenous Line  Duration                  Peripheral IV - Single Lumen 07/05/23 1338 22 G Right Hand <1 day                    Physical Exam  Cardiovascular:      Rate and Rhythm: Normal rate and regular rhythm.   Pulmonary:      Effort: Pulmonary effort is normal.      Breath sounds: Normal breath sounds.       Significant Labs:      Significant Imaging:      Assessment/Plan:     There are  no hospital problems to display for this patient.      Indication for procedure:    ASA:III  Airway normal  Malampati class:    Personal and family history negative for anesthesia problems    Plan:colonoscopy  Anesthesia plan: general      Aston Varela MD  Gastroenterology  [facility] [place] [facility]

## 2023-07-05 NOTE — TRANSFER OF CARE
Anesthesia Transfer of Care Note    Patient: Driss Hernandez JrCherise    Procedure(s) Performed: Procedure(s) (LRB):  COLONOSCOPY (N/A)    Patient location: GI    Anesthesia Type: general    Transport from OR: Transported from OR on room air with adequate spontaneous ventilation    Post assessment: no apparent anesthetic complications and tolerated procedure well    Post vital signs: stable    Level of consciousness: awake, alert and oriented    Nausea/Vomiting: no nausea/vomiting    Complications: none    Transfer of care protocol was followed      Last vitals:   Visit Vitals  BP (!) 106/56   Pulse (!) 53   Temp 36.4 °C (97.6 °F) (Oral)   Resp 20   SpO2 100%

## 2023-07-05 NOTE — PROVATION PATIENT INSTRUCTIONS
Discharge Summary/Instructions after an Endoscopic Procedure  Patient Name: Driss Hernandez  Patient MRN: [id number]  Patient YOB: 1950  Wednesday, July 5, 2023  Aston Varela MD  Dear patient,  As a result of recent federal legislation (The Federal Cures Act), you may   receive lab or pathology results from your procedure in your [facility]   account before your physician is able to contact you. Your physician or   their representative will relay the results to you with their   recommendations at their soonest availability.  Thank you,  RESTRICTIONS:  During your procedure today, you received medications for sedation.  These   medications may affect your judgment, balance and coordination.  Therefore,   for 24 hours, you have the following restrictions:   - DO NOT drive a car, operate machinery, make legal/financial decisions,   sign important papers or drink alcohol.    ACTIVITY:  Today: no heavy lifting, straining or running due to procedural   sedation/anesthesia.  The following day: return to full activity including work.  DIET:  Eat and drink normally unless instructed otherwise.     TREATMENT FOR COMMON SIDE EFFECTS:  - Mild abdominal pain, nausea, belching, bloating or excessive gas:  rest,   eat lightly and use a heating pad.  - Sore Throat: treat with throat lozenges and/or gargle with warm salt   water.  - Because air was used during the procedure, expelling large amounts of air   from your rectum or belching is normal.  - If a bowel prep was taken, you may not have a bowel movement for 1-3 days.    This is normal.  SYMPTOMS TO WATCH FOR AND REPORT TO YOUR PHYSICIAN:  1. Abdominal pain or bloating, other than gas cramps.  2. Chest pain.  3. Back pain.  4. Signs of infection such as: chills or fever occurring within 24 hours   after the procedure.  5. Rectal bleeding, which would show as bright red, maroon, or black stools.   (A tablespoon of blood from the rectum is not serious, especially if    hemorrhoids are present.)  6. Vomiting.  7. Weakness or dizziness.  GO DIRECTLY TO THE NEAREST EMERGENCY ROOM IF YOU HAVE ANY OF THE FOLLOWING:      Difficulty breathing              Chills and/or fever over 101 F   Persistent vomiting and/or vomiting blood   Severe abdominal pain   Severe chest pain   Black, tarry stools   Bleeding- more than one tablespoon   Any other symptom or condition that you feel may need urgent attention  Your doctor recommends these additional instructions:  If any biopsies were taken, your doctors clinic will contact you in 1 to 2   weeks with any results.  - Discharge patient to home (ambulatory).   - Patient has a contact number available for emergencies.  The signs and   symptoms of potential delayed complications were discussed with the   patient.  Return to normal activities tomorrow.  Written discharge   instructions were provided to the patient.   - Resume previous diet.   - Continue present medications.   - Return to primary care physician as previously scheduled.   - Repeat colonoscopy in 5 years for surveillance.   - Resume Eliquis (apixaban) at prior dose tomorrow.   - Await pathology results.  For questions, problems or results please call your physician - Aston Varela MD at Work:  [phone].  [facility] can be reached at [phone]     IF A COMPLICATION OR EMERGENCY SITUATION ARISES AND YOU ARE UNABLE TO REACH   YOUR PHYSICIAN - GO DIRECTLY TO THE EMERGENCY ROOM.  Aston Varela MD  7/5/2023 2:24:30 PM  This report has been verified and signed electronically.  Dear patient,  As a result of recent federal legislation (The Federal Cures Act), you may   receive lab or pathology results from your procedure in your [facility]   account before your physician is able to contact you. Your physician or   their representative will relay the results to you with their   recommendations at their soonest availability.  Thank you,  PROVATION

## 2023-07-06 NOTE — ANESTHESIA POSTPROCEDURE EVALUATION
Anesthesia Post Evaluation    Patient: Driss Hernandez     Procedure(s) Performed: Procedure(s) (LRB):  COLONOSCOPY (N/A)    Final Anesthesia Type: general      Patient location during evaluation: GI PACU  Patient participation: Yes- Able to Participate  Level of consciousness: awake and alert and oriented  Post-procedure vital signs: reviewed and stable  Pain management: adequate  Airway patency: patent    PONV status at discharge: No PONV  Anesthetic complications: no      Cardiovascular status: hemodynamically stable and blood pressure returned to baseline  Respiratory status: spontaneous ventilation, room air and unassisted  Hydration status: euvolemic  Follow-up not needed.          Vitals Value Taken Time   /65 07/05/23 1458   Temp 36.4 °C (97.6 °F) 07/05/23 1428   Pulse 57 07/05/23 1458   Resp 20 07/05/23 1458   SpO2 99 % 07/05/23 1458         Event Time   Out of Recovery 15:29:45         Pain/Jose Maria Score: Jose Maria Score: 10 (7/5/2023  2:58 PM)

## 2023-07-07 LAB
FINAL PATHOLOGIC DIAGNOSIS: NORMAL
GROSS: NORMAL
Lab: NORMAL

## 2023-07-07 RX ORDER — INSULIN DEGLUDEC 100 U/ML
20 INJECTION, SOLUTION SUBCUTANEOUS DAILY
Qty: 30 ML | Refills: 4 | Status: SHIPPED | OUTPATIENT
Start: 2023-07-07

## 2023-07-08 ENCOUNTER — PATIENT MESSAGE (OUTPATIENT)
Dept: GASTROENTEROLOGY | Facility: HOSPITAL | Age: 73
End: 2023-07-08
Payer: MEDICARE

## 2023-07-09 ENCOUNTER — PATIENT MESSAGE (OUTPATIENT)
Dept: PAIN MEDICINE | Facility: CLINIC | Age: 73
End: 2023-07-09
Payer: MEDICARE

## 2023-07-10 DIAGNOSIS — G47.00 INSOMNIA, UNSPECIFIED TYPE: ICD-10-CM

## 2023-07-10 NOTE — TELEPHONE ENCOUNTER
----- Message from Sofia Jacobo sent at 7/10/2023 11:16 AM CDT -----      Can the clinic reply in [facility]:Y        Please refill the medication(s) listed below. Please call the patient when the prescription(s) is ready for  at this phone number         Medication #1 metoclopramide HCl (REGLAN) 10 MG tablet    Medication #2       Preferred Pharmacy: [id number] - [place] - [address] AT [facility]

## 2023-07-10 NOTE — TELEPHONE ENCOUNTER
No care due was identified.  [facility] Embedded Care Due Messages. Reference number: [id number].   7/10/2023 7:09:56 AM CDT

## 2023-07-13 ENCOUNTER — PATIENT MESSAGE (OUTPATIENT)
Dept: FAMILY MEDICINE | Facility: CLINIC | Age: 73
End: 2023-07-13
Payer: MEDICARE

## 2023-07-13 ENCOUNTER — OFFICE VISIT (OUTPATIENT)
Dept: ORTHOPEDICS | Facility: CLINIC | Age: 73
End: 2023-07-13
Payer: MEDICARE

## 2023-07-13 DIAGNOSIS — M75.111 NONTRAUMATIC INCOMPLETE TEAR OF RIGHT ROTATOR CUFF: Primary | ICD-10-CM

## 2023-07-13 PROCEDURE — 20610 LARGE JOINT ASPIRATION/INJECTION: R SUBACROMIAL BURSA: ICD-10-PCS | Mod: RT,S$GLB,, | Performed by: ORTHOPAEDIC SURGERY

## 2023-07-13 PROCEDURE — 3288F FALL RISK ASSESSMENT DOCD: CPT | Mod: CPTII,S$GLB,, | Performed by: ORTHOPAEDIC SURGERY

## 2023-07-13 PROCEDURE — 99999 PR PBB SHADOW E&M-EST. PATIENT-LVL IV: CPT | Mod: PBBFAC,,, | Performed by: ORTHOPAEDIC SURGERY

## 2023-07-13 PROCEDURE — 1101F PT FALLS ASSESS-DOCD LE1/YR: CPT | Mod: CPTII,S$GLB,, | Performed by: ORTHOPAEDIC SURGERY

## 2023-07-13 PROCEDURE — 3044F HG A1C LEVEL LT 7.0%: CPT | Mod: CPTII,S$GLB,, | Performed by: ORTHOPAEDIC SURGERY

## 2023-07-13 PROCEDURE — 3066F PR DOCUMENTATION OF TREATMENT FOR NEPHROPATHY: ICD-10-PCS | Mod: CPTII,S$GLB,, | Performed by: ORTHOPAEDIC SURGERY

## 2023-07-13 PROCEDURE — 3288F PR FALLS RISK ASSESSMENT DOCUMENTED: ICD-10-PCS | Mod: CPTII,S$GLB,, | Performed by: ORTHOPAEDIC SURGERY

## 2023-07-13 PROCEDURE — 99213 OFFICE O/P EST LOW 20 MIN: CPT | Mod: 25,S$GLB,, | Performed by: ORTHOPAEDIC SURGERY

## 2023-07-13 PROCEDURE — 1101F PR PT FALLS ASSESS DOC 0-1 FALLS W/OUT INJ PAST YR: ICD-10-PCS | Mod: CPTII,S$GLB,, | Performed by: ORTHOPAEDIC SURGERY

## 2023-07-13 PROCEDURE — 1125F PR PAIN SEVERITY QUANTIFIED, PAIN PRESENT: ICD-10-PCS | Mod: CPTII,S$GLB,, | Performed by: ORTHOPAEDIC SURGERY

## 2023-07-13 PROCEDURE — 1160F PR REVIEW ALL MEDS BY PRESCRIBER/CLIN PHARMACIST DOCUMENTED: ICD-10-PCS | Mod: CPTII,S$GLB,, | Performed by: ORTHOPAEDIC SURGERY

## 2023-07-13 PROCEDURE — 3044F PR MOST RECENT HEMOGLOBIN A1C LEVEL <7.0%: ICD-10-PCS | Mod: CPTII,S$GLB,, | Performed by: ORTHOPAEDIC SURGERY

## 2023-07-13 PROCEDURE — 1159F MED LIST DOCD IN RCRD: CPT | Mod: CPTII,S$GLB,, | Performed by: ORTHOPAEDIC SURGERY

## 2023-07-13 PROCEDURE — 99999 PR PBB SHADOW E&M-EST. PATIENT-LVL IV: ICD-10-PCS | Mod: PBBFAC,,, | Performed by: ORTHOPAEDIC SURGERY

## 2023-07-13 PROCEDURE — 3061F NEG MICROALBUMINURIA REV: CPT | Mod: CPTII,S$GLB,, | Performed by: ORTHOPAEDIC SURGERY

## 2023-07-13 PROCEDURE — 3061F PR NEG MICROALBUMINURIA RESULT DOCUMENTED/REVIEW: ICD-10-PCS | Mod: CPTII,S$GLB,, | Performed by: ORTHOPAEDIC SURGERY

## 2023-07-13 PROCEDURE — 1159F PR MEDICATION LIST DOCUMENTED IN MEDICAL RECORD: ICD-10-PCS | Mod: CPTII,S$GLB,, | Performed by: ORTHOPAEDIC SURGERY

## 2023-07-13 PROCEDURE — 99213 PR OFFICE/OUTPT VISIT, EST, LEVL III, 20-29 MIN: ICD-10-PCS | Mod: 25,S$GLB,, | Performed by: ORTHOPAEDIC SURGERY

## 2023-07-13 PROCEDURE — 3066F NEPHROPATHY DOC TX: CPT | Mod: CPTII,S$GLB,, | Performed by: ORTHOPAEDIC SURGERY

## 2023-07-13 PROCEDURE — 1125F AMNT PAIN NOTED PAIN PRSNT: CPT | Mod: CPTII,S$GLB,, | Performed by: ORTHOPAEDIC SURGERY

## 2023-07-13 PROCEDURE — 1160F RVW MEDS BY RX/DR IN RCRD: CPT | Mod: CPTII,S$GLB,, | Performed by: ORTHOPAEDIC SURGERY

## 2023-07-13 PROCEDURE — 20610 DRAIN/INJ JOINT/BURSA W/O US: CPT | Mod: RT,S$GLB,, | Performed by: ORTHOPAEDIC SURGERY

## 2023-07-13 RX ORDER — METOCLOPRAMIDE 10 MG/1
10 TABLET ORAL EVERY 6 HOURS PRN
Qty: 30 TABLET | Refills: 0 | Status: SHIPPED | OUTPATIENT
Start: 2023-07-13 | End: 2023-08-31

## 2023-07-13 RX ADMIN — TRIAMCINOLONE ACETONIDE 40 MG: 40 INJECTION, SUSPENSION INTRA-ARTICULAR; INTRAMUSCULAR at 09:07

## 2023-07-13 NOTE — PROGRESS NOTES
Follow up visit    History of Present Illness:   Driss comes to the office for follow up evaluation of right shoulder pain. Pain at night, localized to anterior shoulder.    Injection was helpful, seems to have worn off.  He continues to perform heavy lifting and heavy activities with the right arm.  He agrees that this is likely exacerbating his pain but has not modified these activities.  At the last visit he did not feel that he needed to repeat the injection.  He would like to repeat the injection today     Also complaining of some leg pain localized to the buttocks with radiation down the leg.  \    ROS: unremarkable and no change since last visit    Physical Examination:    NAD  Right shoulder  Forward elevation to 150, external rotation to 45, internal rotation to L5  Good strength with cuff testing  Minimal pain with Donna's    Radiographic imaging:  No new    Assessment/Plan:  72 y.o. male  with Right rotator cuff pathology    We discussed the etiology of persistent pain and further treatment options.  Injection of the right subacromial space  performed, please see procedure note for more details.  Prior to the injection risks and benefits of corticosteroid injection were discussed with the patient including pain, infection, bleeding, skin color changes, swelling, steroid flare. We discussed that over time injections can result in chondral damage, acceleration of arthritis formation, damage to tendons and damage to joints.  The patient consented for the procedure.  Post-injection instructions were given to the patient in writing.   Referred to pain management for leg pain, suspect lumbar radiculopathy      All questions were answered in detail. The patient  verbalized the understanding of the treatment plan and is in full agreement with the treatment plan.

## 2023-07-13 NOTE — TELEPHONE ENCOUNTER
No care due was identified.  [facility] Embedded Care Due Messages. Reference number: [id number].   7/13/2023 7:57:15 AM CDT

## 2023-07-14 ENCOUNTER — OFFICE VISIT (OUTPATIENT)
Dept: PAIN MEDICINE | Facility: CLINIC | Age: 73
End: 2023-07-14
Payer: MEDICARE

## 2023-07-14 VITALS
OXYGEN SATURATION: 100 % | HEART RATE: 43 BPM | DIASTOLIC BLOOD PRESSURE: 74 MMHG | RESPIRATION RATE: 20 BRPM | SYSTOLIC BLOOD PRESSURE: 174 MMHG

## 2023-07-14 DIAGNOSIS — M54.16 LUMBAR RADICULOPATHY: ICD-10-CM

## 2023-07-14 DIAGNOSIS — M47.816 LUMBAR SPONDYLOSIS: Primary | ICD-10-CM

## 2023-07-14 DIAGNOSIS — M54.16 LUMBAR RADICULOPATHY, CHRONIC: ICD-10-CM

## 2023-07-14 DIAGNOSIS — M51.36 DDD (DEGENERATIVE DISC DISEASE), LUMBAR: ICD-10-CM

## 2023-07-14 PROCEDURE — 3077F PR MOST RECENT SYSTOLIC BLOOD PRESSURE >= 140 MM HG: ICD-10-PCS | Mod: CPTII,S$GLB,, | Performed by: PAIN MEDICINE

## 2023-07-14 PROCEDURE — 1159F MED LIST DOCD IN RCRD: CPT | Mod: CPTII,S$GLB,, | Performed by: PAIN MEDICINE

## 2023-07-14 PROCEDURE — 3066F NEPHROPATHY DOC TX: CPT | Mod: CPTII,S$GLB,, | Performed by: PAIN MEDICINE

## 2023-07-14 PROCEDURE — 3044F PR MOST RECENT HEMOGLOBIN A1C LEVEL <7.0%: ICD-10-PCS | Mod: CPTII,S$GLB,, | Performed by: PAIN MEDICINE

## 2023-07-14 PROCEDURE — 1159F PR MEDICATION LIST DOCUMENTED IN MEDICAL RECORD: ICD-10-PCS | Mod: CPTII,S$GLB,, | Performed by: PAIN MEDICINE

## 2023-07-14 PROCEDURE — 3066F PR DOCUMENTATION OF TREATMENT FOR NEPHROPATHY: ICD-10-PCS | Mod: CPTII,S$GLB,, | Performed by: PAIN MEDICINE

## 2023-07-14 PROCEDURE — 99214 PR OFFICE/OUTPT VISIT, EST, LEVL IV, 30-39 MIN: ICD-10-PCS | Mod: S$GLB,,, | Performed by: PAIN MEDICINE

## 2023-07-14 PROCEDURE — 99214 OFFICE O/P EST MOD 30 MIN: CPT | Mod: S$GLB,,, | Performed by: PAIN MEDICINE

## 2023-07-14 PROCEDURE — 1125F AMNT PAIN NOTED PAIN PRSNT: CPT | Mod: CPTII,S$GLB,, | Performed by: PAIN MEDICINE

## 2023-07-14 PROCEDURE — 99999 PR PBB SHADOW E&M-EST. PATIENT-LVL V: CPT | Mod: PBBFAC,,, | Performed by: PAIN MEDICINE

## 2023-07-14 PROCEDURE — 3044F HG A1C LEVEL LT 7.0%: CPT | Mod: CPTII,S$GLB,, | Performed by: PAIN MEDICINE

## 2023-07-14 PROCEDURE — 1160F PR REVIEW ALL MEDS BY PRESCRIBER/CLIN PHARMACIST DOCUMENTED: ICD-10-PCS | Mod: CPTII,S$GLB,, | Performed by: PAIN MEDICINE

## 2023-07-14 PROCEDURE — 3061F NEG MICROALBUMINURIA REV: CPT | Mod: CPTII,S$GLB,, | Performed by: PAIN MEDICINE

## 2023-07-14 PROCEDURE — 3288F FALL RISK ASSESSMENT DOCD: CPT | Mod: CPTII,S$GLB,, | Performed by: PAIN MEDICINE

## 2023-07-14 PROCEDURE — 1160F RVW MEDS BY RX/DR IN RCRD: CPT | Mod: CPTII,S$GLB,, | Performed by: PAIN MEDICINE

## 2023-07-14 PROCEDURE — 1125F PR PAIN SEVERITY QUANTIFIED, PAIN PRESENT: ICD-10-PCS | Mod: CPTII,S$GLB,, | Performed by: PAIN MEDICINE

## 2023-07-14 PROCEDURE — 3288F PR FALLS RISK ASSESSMENT DOCUMENTED: ICD-10-PCS | Mod: CPTII,S$GLB,, | Performed by: PAIN MEDICINE

## 2023-07-14 PROCEDURE — 99999 PR PBB SHADOW E&M-EST. PATIENT-LVL V: ICD-10-PCS | Mod: PBBFAC,,, | Performed by: PAIN MEDICINE

## 2023-07-14 PROCEDURE — 1101F PR PT FALLS ASSESS DOC 0-1 FALLS W/OUT INJ PAST YR: ICD-10-PCS | Mod: CPTII,S$GLB,, | Performed by: PAIN MEDICINE

## 2023-07-14 PROCEDURE — 3078F DIAST BP <80 MM HG: CPT | Mod: CPTII,S$GLB,, | Performed by: PAIN MEDICINE

## 2023-07-14 PROCEDURE — 3077F SYST BP >= 140 MM HG: CPT | Mod: CPTII,S$GLB,, | Performed by: PAIN MEDICINE

## 2023-07-14 PROCEDURE — 3061F PR NEG MICROALBUMINURIA RESULT DOCUMENTED/REVIEW: ICD-10-PCS | Mod: CPTII,S$GLB,, | Performed by: PAIN MEDICINE

## 2023-07-14 PROCEDURE — 3078F PR MOST RECENT DIASTOLIC BLOOD PRESSURE < 80 MM HG: ICD-10-PCS | Mod: CPTII,S$GLB,, | Performed by: PAIN MEDICINE

## 2023-07-14 PROCEDURE — 1101F PT FALLS ASSESS-DOCD LE1/YR: CPT | Mod: CPTII,S$GLB,, | Performed by: PAIN MEDICINE

## 2023-07-14 RX ORDER — TRIAMCINOLONE ACETONIDE 40 MG/ML
40 INJECTION, SUSPENSION INTRA-ARTICULAR; INTRAMUSCULAR
Status: DISCONTINUED | OUTPATIENT
Start: 2023-07-13 | End: 2023-07-14 | Stop reason: HOSPADM

## 2023-07-14 RX ORDER — TIZANIDINE 4 MG/1
4 TABLET ORAL EVERY 6 HOURS PRN
Qty: 360 TABLET | Refills: 3 | Status: SHIPPED | OUTPATIENT
Start: 2023-07-14 | End: 2024-07-13

## 2023-07-14 NOTE — PROGRESS NOTES
Subjective:     Patient ID: Driss Hernandez Jr. is a 72 y.o. male    Chief Complaint: Leg Pain (Right sided achy pain)      Referred by: No ref. provider found      HPI:    Interval History (7/14/23):  He returns today for follow up.  He reports that he has been having worsening low back and lower extremity pain.  States he continues to have bilateral low back pain as before though it has worsened since last encounter.  Also states that he is now having right-sided hip and thigh pain associated with his back pain.  These symptoms have been present for roughly 6 months.  This is typically present when sitting for prolonged periods of time.  States when he gets up he states that his thigh feels like it is being twisted.  The pain extends in a distribution consistent with the L3 dermatome.  Denies any associated paresthesias.  Does feel that the right lower extremity is weak when getting up after sitting though strength will return to normal after a few minutes.  Denies any associated bowel or bladder dysfunction.      Interval History NP (5/17/2022):  Mr Hernandez presents for delayed FU. Returning lower back pain where TPIs have done well in past and requesting repeat. He will be having shoulder surgery upcoming. Denies new areas of pain or neurological changes. No focal voicing of  myelopathic symptoms such as handwriting changes or difficulty with buttons/coins/keys. Denies perineal paresthesias, bowel/bladder dysfunction. Currently taking neurontin and zanaflex being taken minimally at this time.       Interval History (9/22/21):  He returns today for follow up.  He reports that TPIs done at last visit have been helpful. His low back is doing well and does not need any further attention at this time. He does continue to have intermittent severe right shoulder pain. He denies any changes in the quality or location of this pain since last encounter. He denies any new or worsened symptoms.       Interval History  (8/19/21):  He returns today for follow up.  He reports that physical therapy has been helpful for the right neck and shoulder pain.  He states that his neck pain was never very severe and has now essentially resolved.  He states that the vast majority of his pain is located right anterior shoulder.  The pain is exacerbated with certain movements and is particularly bothersome while sleeping.  He denies any associated numbness tingling with right shoulder pain.  Patient also states that he has recurrent low back pain.  Similar in quality and location to previous back pain that was well treated with trigger point injections.  He would like repeat trigger point injections done today      Interval History (6/4/21):  He returns today for follow up.  He reports that he has had right-sided neck and upper extremity pain for the past 2 weeks.  He states the pain started after handing his wife a heavy flower pot.  The pain is located the right lower cervical paraspinal region radiate to the right wrist with associated numbness and tingling in the fingers of the right hand.  He denies any focal weakness or bowel bladder dysfunction.  The pain is constant worse with activity.  The pain disrupts his sleep.       Interval History (1/6/20):  He returns today for follow up.  He reports that lumbar trigger point injections have been helpful for the bilateral low back pain. He reports greater than 85% relief for over 9 months.  He states the pain has returned.  He denies any changes in the quality or location was pain. He would like repeat trigger point injections today.      Interval History (3/18/19):  He returns today for follow up.  He reports that trigger point injections have been helpful for the right-sided low back pain. He reports near complete resolution of the sharp right-sided low back pain. He does report that his previous low back pain starting to return.  He feels as though his current pain is exact same pain that was  previously helped by sacroiliac joint injections.  He is interested in repeat injections if appropriate.      Interval History (2/12/19):  He returns today for follow up and imaging review.  He reports that his pain is unchanged since last encounter.  He denies any new or worsening neurologic symptoms.      Interval History (2/7/19):  He returns today for follow up.  He reports that he has been having acute right-sided low back pain for about 1 week.  He denies any specific inciting event or injury.  The pain is located in the lateral aspect of the right lumbosacral region.  It does not radiate.  He denies any associated numbness, tingling, weakness, bowel bladder dysfunction.  The pain varies in intensity from day-to-day.  The pain is much worse with coughing and sneezing and sitting with a forward flexed posture.  He is still able to go to the gym at times.      Interval History (12/17/18):  He returns today for follow up.  He reports that bilateral SIJ injections has been helpful for the low back pain. He reports about 80% relief. He does not feel that he needs any further interventions at this time      Interval History (11/19/18):  He returns today for follow up.  He reports that bilateral lumbar medial branch blocks were not helpful. Pain is unchanged in quality and location since last visit. He denies any new symptoms.       Interval History (10/16/18):  He returns today for follow up.  He reports that he has continued to have bilateral low back pain. He still has some right lower extremity pain, but this is not as bothersome as his low back pain. The pain is located across the lower lumbar region R>L. It does not radiate. It is not associated with any numbness, tingling, weakness or b/b dysfunction. The pain is worse with activity. He also requests refills of tizanidine.      Interval History (12/18/17):  He returns today for follow up.  He reports that right L4 TFESI has been helpful for the right lumbar  radicular pain. He reports 100% relief. He still has some lower back pain, but would prefer to discuss this later. Otherwise he is doing well.       Interval History (11/13/17):  He returns today for follow up.  He reports that PT has been helpful for the low back pain. He still has significant right foot and ankle pain when sitting in a reclined postion. This is the most painful area. He also has low back pain that is constant but does not bother him as much. Otherwise he is doing well.       Driss Hernandez Jr. is a 72 y.o. male who presents today with chronic bilateral low back pain R>>>L. Pain started over 40 years ago. No inciting injury or event noted. Pain is located mainly on the right side of the lower lumbar paraspinals. About 5 weeks ago, patient began to have right lwoer extremity pain that he felt was related to his back pain, but this has subsided. He denies any numbness, tingling, weakness, or b/b dysfunction. The pain is described as dull, but can become sharp at times. Patient states that his pain is worse in the morning. He does not sleep well. States that he wakes up every 45 minutes. Has taken Tizanidine PRN in the pat, but cannot recall how it affected him. Probably has not taken in over a year. Cannot take NSAIDs due to decreased renal function. Has taken in the past with mild relief.  This pain is described in detail below.    Physical Therapy:  Yes.    Non-pharmacologic Treatment: Nothing really helps         TENS? No    Pain Medications:         Currently taking: Gabapentin, Tizandine. Tylenol p.r.n., Celebrex    Has tried in the past:  NSAIDs    Has not tried: Opioids, Tylenol, TCAs, SNRIs, topical creams    Blood thinners:  Plavix, Eliquis    Interventional Therapies:   11/29/17 - Right L4 TFESI - 100% relief  10/28/18 - bilateral SIJ injections -  80% relief  11/14/18 - Bilateral L2, L3, L4 and L5 MBBs - No relief noted  3/20/19 - bilateral sacroiliac joint steroid  injections    Relevant Surgeries: Previous right L4 hemilaminectomy    Affecting sleep? Yes    Affecting daily activities? yes    Depressive symptoms? no          SI/HI? No    Work status: Retired    Pain Scores:    Best:       5/10  Worst:     10/10  Usually:   6/10  Today:    3/10    Review of Systems   Constitutional:  Negative for activity change, appetite change, chills, fatigue, fever and unexpected weight change.   HENT:  Negative for hearing loss.    Eyes:  Negative for visual disturbance.   Respiratory:  Negative for chest tightness and shortness of breath.    Cardiovascular:  Negative for chest pain.   Gastrointestinal:  Negative for abdominal pain, constipation, diarrhea, nausea and vomiting.   Genitourinary:  Negative for difficulty urinating.   Musculoskeletal:  Positive for back pain, gait problem and myalgias. Negative for neck pain.   Skin:  Negative for rash.   Neurological:  Positive for weakness. Negative for dizziness, light-headedness, numbness and headaches.   Psychiatric/Behavioral:  Positive for sleep disturbance. Negative for hallucinations and suicidal ideas. The patient is not nervous/anxious.      Past Medical History:   Diagnosis Date    Chronic heart failure with preserved ejection fraction 2/9/2023    Chronic midline low back pain without sciatica 10/2/2017    Colon polyps     Coronary artery disease involving native coronary artery of native heart 3/5/2020    Coronary artery disease involving native coronary artery of native heart with angina pectoris 12/10/2020    Diabetes mellitus with neurological manifestations, uncontrolled 1/24/2017    Diabetic polyneuropathy associated with type 2 diabetes mellitus 1/24/2017    Diabetic polyneuropathy associated with type 2 diabetes mellitus     Essential hypertension 1/24/2017    Gastroesophageal reflux disease 1/24/2017    Hyperlipidemia 1/24/2017    Insomnia 1/24/2017    Long-term insulin use 1/24/2017    Longstanding persistent atrial  fibrillation 12/30/2020    Lumbar spondylosis 11/13/2017    Nuclear sclerosis of both eyes 8/24/2017    Obesity     PAD (peripheral artery disease) 3/23/2022    Stage 3 chronic kidney disease 2/13/2019    Uncontrolled type 2 diabetes mellitus without complication, with long-term current use of insulin 1/24/2017       Past Surgical History:   Procedure Laterality Date    ARTHROSCOPIC REPAIR OF ROTATOR CUFF OF SHOULDER Right 7/5/2022    Procedure: REPAIR, ROTATOR CUFF, ARTHROSCOPIC;  Surgeon: Valerie Olivera MD;  Location: [facility] OR;  Service: Orthopedics;  Laterality: Right;  HETAL LEMUS [phone]/ TEXTED ALLAN ON 6/23/2022 @ 9:47AM. ALLAN RESPONDED ON 6/23/2022 @ 10:33AM-LO  JEAN PAUL BABIN [phone] MY BE HERE FOR CASE SPOKE TO HIM @ 9:59AM ON 7-1-2022  RN PREOP 6/28/2022    BACK SURGERY      2002    CATARACT EXTRACTION W/  INTRAOCULAR LENS IMPLANT Right 08/29/2017    Dr. Hong    CATARACT EXTRACTION W/  INTRAOCULAR LENS IMPLANT Left 02/24/2022    Dr. Hong    COLONOSCOPY N/A 2/21/2017    Procedure: COLONOSCOPY;  Surgeon: Robb Rosado MD;  Location: [facility] ENDO;  Service: Endoscopy;  Laterality: N/A;    COLONOSCOPY N/A 7/5/2023    Procedure: COLONOSCOPY;  Surgeon: Aston Varela MD;  Location: [facility] ENDO;  Service: Endoscopy;  Laterality: N/A;  Referral: JOVANNI SCANLON  ok to hold Plavix 5 days and Eliquis 2 days per Dr Purdy-OFELIA   Meds  Suprep   Inst portal   LW    CORONARY ANGIOGRAPHY INCLUDING BYPASS GRAFTS WITH CATHETERIZATION OF LEFT HEART N/A 11/11/2020    Procedure: ANGIOGRAM, CORONARY, INCLUDING BYPASS GRAFT, WITH LEFT HEART CATHETERIZATION;  Surgeon: Lane Cuellar MD;  Location: [facility] CATH LAB;  Service: Cardiology;  Laterality: N/A;  RN PRE OP 11-5-2020. --COVID NEGATIVE ON  11-. C A    CORONARY ARTERY BYPASS GRAFT      March 2016    EPIDURAL STEROID INJECTION Bilateral 11/14/2018    Procedure: Lumbar Medial Branch Blocks;  Surgeon: Eze Byrd Jr.,  MD;  Location: [facility] ENDO;  Service: Pain Management;  Laterality: Bilateral;  Bilateral Lumbar Medial Branch Blocks L2, L3, L4, L5    [id number]  [id number]    Arrive @ 1150; NO Sedation    EPIDURAL STEROID INJECTION Bilateral 11/28/2018    Procedure: Injection, Steroid, Epidural;  Surgeon: Eze Byrd Jr., MD;  Location: [facility] ENDO;  Service: Pain Management;  Laterality: Bilateral;  Bilateral Sacroiliac Joint Steroid Injections    [id number]    Arrive @ 0910    EPIDURAL STEROID INJECTION Bilateral 3/20/2019    Procedure: Injection, Steroid, Sacroiliiac Joint;  Surgeon: Eze Byrd Jr., MD;  Location: [facility] ENDO;  Service: Pain Management;  Laterality: Bilateral;  Bilateral Sacroiliac Joint Steroid Injections    [id number]    Arrive @ 1145; Trigger point injections also?    INTRAOCULAR PROSTHESES INSERTION Left 2/24/2022    Procedure: INSERTION, IOL PROSTHESIS;  Surgeon: Nickolas Hong MD;  Location: [facility] OR;  Service: Ophthalmology;  Laterality: Left;    PHACOEMULSIFICATION OF CATARACT Left 2/24/2022    Procedure: PHACOEMULSIFICATION, CATARACT;  Surgeon: Nickolas Hong MD;  Location: [facility] OR;  Service: Ophthalmology;  Laterality: Left;  RN Phone Pre Op 2-16-22.  Covid NEGATIVE  2-23-22.  Arrival 08:00 am.    TONSILLECTOMY         Social History     Socioeconomic History    Marital status:    Tobacco Use    Smoking status: Never     Passive exposure: Never    Smokeless tobacco: Never   Substance and Sexual Activity    Alcohol use: Yes     Comment: rare occassion    Drug use: No    Sexual activity: Yes     Partners: Female     Social Determinants of Health     Financial Resource Strain: Low Risk     Difficulty of Paying Living Expenses: Not hard at all   Food Insecurity: No Food Insecurity    Worried About Running Out of Food in the Last Year: Never true    Ran Out of Food in the Last Year: Never true   Transportation Needs: No Transportation Needs    Lack of Transportation (Medical): No    Lack of  "Transportation (Non-Medical): No   Physical Activity: Insufficiently Active    Days of Exercise per Week: 2 days    Minutes of Exercise per Session: 60 min   Stress: No Stress Concern Present    Feeling of Stress : Only a little   Social Connections: Moderately Integrated    Frequency of Communication with Friends and Family: More than three times a week    Frequency of Social Gatherings with Friends and Family: Three times a week    Attends Anabaptism Services: Never    Active Member of Clubs or Organizations: Yes    Attends Club or Organization Meetings: More than 4 times per year    Marital Status:    Housing Stability: Low Risk     Unable to Pay for Housing in the Last Year: No    Number of Places Lived in the Last Year: 1    Unstable Housing in the Last Year: No       Review of patient's allergies indicates:   Allergen Reactions    Penicillins Other (See Comments)     Unknown reaction, Had a reaction as a child    Shrimp Itching     Hand itching       Current Outpatient Medications on File Prior to Visit   Medication Sig Dispense Refill    albuterol (PROVENTIL/VENTOLIN HFA) 90 mcg/actuation inhaler Inhale 2 puffs into the lungs every 4 (four) hours as needed for Shortness of Breath. Rescue 17 g 3    apixaban (ELIQUIS) 5 mg Tab Take 1 tablet (5 mg total) by mouth 2 (two) times daily. 180 tablet 3    ascorbic acid, vitamin C, (VITAMIN C) 100 MG tablet Take 100 mg by mouth daily as needed.      atorvastatin (LIPITOR) 80 MG tablet Take 1 tablet (80 mg total) by mouth every evening. 90 tablet 3    b complex vitamins tablet Take 1 tablet by mouth once daily.      BD ALCOHOL SWABS PadM       BD ULTRA-FINE ROLAND PEN NEEDLES 32 gauge x 5/32" Ndle       blood sugar diagnostic Strp Check blood glucose 2 times a day. True metrix. E11.65 200 strip 3    blood-glucose meter kit Test glucose 2-3x/day. True metrix 1 each 0    celecoxib (CELEBREX) 100 MG capsule TAKE 1 CAPSULE(100 MG) BY MOUTH EVERY DAY 30 capsule 6    " clopidogreL (PLAVIX) 75 mg tablet TAKE 1 TABLET(75 MG) BY MOUTH EVERY DAY 90 tablet 3    coenzyme Q10 100 mg capsule Take 100 mg by mouth once daily.      dapagliflozin (FARXIGA) 5 mg Tab tablet Take 1 tablet (5 mg total) by mouth once daily. 30 tablet 3    ergocalciferol (VITAMIN D2) 50,000 unit Cap TAKE ONE CAPSULE BY MOUTH WEEKLY 12 capsule 2    fenofibrate 160 MG Tab TAKE 1 TABLET EVERY MORNING 90 tablet 1    furosemide (LASIX) 20 MG tablet TAKE 1 TABLET TWICE DAILY 180 tablet 3    gabapentin (NEURONTIN) 100 MG capsule TAKE 2 CAPSULES EVERY MORNING AND TAKE 3 CAPSULES EVERY DAY WITH DINNER 450 capsule 12    glimepiride (AMARYL) 2 MG tablet TAKE 1 TABLET EVERY DAY BEFORE BREAKFAST 90 tablet 2    hydrALAZINE (APRESOLINE) 50 MG tablet Take 1 tablet (50 mg total) by mouth 2 (two) times a day. 180 tablet 3    HYDROcodone-acetaminophen (NORCO) 5-325 mg per tablet Take 1 tablet by mouth every 8 (eight) hours as needed for Pain. 15 tablet 0    insulin degludec (TRESIBA FLEXTOUCH U-100) 100 unit/mL (3 mL) insulin pen Inject 20 Units into the skin once daily. 30 mL 4    isosorbide mononitrate (IMDUR) 60 MG 24 hr tablet Take 1 tablet (60 mg total) by mouth once daily. 90 tablet 11    ketoconazole (NIZORAL) 2 % cream Apply topically once daily. 60 g 6    lancets Misc Check blood glucose 2x/day. True metrix. E11.65 200 each 3    magnesium 250 mg Tab Take 1 tablet by mouth once daily.       metFORMIN (GLUCOPHAGE-XR) 500 MG ER 24hr tablet Take 1 tablet with breakfast and 2 tablets with supper. 270 tablet 2    metoclopramide HCl (REGLAN) 10 MG tablet Take 1 tablet (10 mg total) by mouth every 6 (six) hours as needed (Nausea). 30 tablet 0    nitroGLYCERIN (NITROSTAT) 0.4 MG SL tablet Place 1 tablet (0.4 mg total) under the tongue every 5 (five) minutes as needed for Chest pain. 25 tablet 11    omega-3 acid ethyl esters (LOVAZA) 1 gram capsule Take 2 capsules (2 g total) by mouth 2 (two) times daily. 360 capsule 3    ondansetron  (ZOFRAN-ODT) 4 MG TbDL Dissolve 1 tablet (4 mg total) by mouth every 6 (six) hours as needed (nausea). 10 tablet 0    pantoprazole (PROTONIX) 40 MG tablet TAKE 1 TABLET EVERY DAY 90 tablet 3    pantoprazole (PROTONIX) 40 MG tablet Take 1 tablet (40 mg total) by mouth once daily. 30 tablet 0    semaglutide (OZEMPIC) 2 mg/dose (8 mg/3 mL) PnIj Inject 2 mg into the skin every 7 days. 4 each 3    semaglutide (OZEMPIC) 2 mg/dose (8 mg/3 mL) PnIj Inject 2 mg into the skin every 7 days. 1 each 0    triazolam (HALCION) 0.25 MG Tab TAKE 1 TABLET BY MOUTH EVERY NIGHT AT BEDTIME AS NEEDED 30 tablet 5    TRUEPLUS LANCETS 33 gauge Misc       [DISCONTINUED] tiZANidine (ZANAFLEX) 4 MG tablet Take 4 mg by mouth every 6 (six) hours as needed.       Current Facility-Administered Medications on File Prior to Visit   Medication Dose Route Frequency Provider Last Rate Last Admin    cyclopentolate 1% ophthalmic solution 1 drop  1 drop Left Eye On Call Procedure Nickolas Hong MD   1 drop at 02/24/22 0901    ofloxacin 0.3 % ophthalmic solution 1 drop  1 drop Left Eye On Call Procedure Nickolas Hong MD   2 drop at 02/24/22 1017    sodium chloride 0.9% flush 10 mL  10 mL Intravenous PRN Nickolas Hong MD        triamcinolone acetonide injection 40 mg  40 mg Intra-articular  Valerie Olivera MD   40 mg at 02/09/23 1030       Objective:      BP (!) 174/74 (BP Location: Right arm, Patient Position: Sitting, BP Method: Medium (Automatic))   Pulse (!) 43   Resp 20   SpO2 100%     Exam:  GEN:  Well developed, well nourished.  No acute distress.  Normal pain behavior.  HEENT:  No trauma.  Mucous membranes moist.  Nares patent bilaterally.  PSYCH: Normal affect. Thought content appropriate.  CHEST:  Breathing symmetric.  No audible wheezing.  ABD: Soft, non-distended.  SKIN:  Warm, pink, dry.  No rash on exposed areas.    EXT:  No cyanosis, clubbing, or edema.  No color change or changes in nail or hair  growth.  NEURO/MUSCULOSKELETAL:  Fully alert, oriented, and appropriate. Speech normal ivania. No cranial nerve deficits.   Gait:  Normal.  No trendelenburg sign bilaterally.   Motor Strength:  5/5 motor strength throughout lower extremities.   Sensory:  No sensory deficit in the lower extremities.   Reflexes:  1+ and symmetric patellar DTRs.  Unable to elicit bilateral Achilles DTRs.  No clonus or spasticity.  L-Spine:  Limited ROM with pain on flexion.  Positive pain with axial/facet loading bilaterally.  Positive SLR on the right.  Positive femoral stretch on the right.   Positive TTP over midline lower lumbar spine and right lower lumbar paraspinals            Imaging:      Narrative & Impression    EXAMINATION:  XR CERVICAL SPINE AP LAT WITH FLEX EXTEN     CLINICAL HISTORY:  Other cervical disc degeneration, unspecified cervical region     TECHNIQUE:  Three views of the cervical spine plus flexion and extension views were performed.     COMPARISON:  None     FINDINGS:  Cervical spine is normal in alignment, vertebral body heights are maintained.  No displaced fracture or subluxation.  No suspicious bone lesion.     No instability between the flexion and extension images.     Mild and moderate multilevel degenerative disc disease and uncovertebral DJD.  Anterior osteophytosis at levels C2-3, C4-5, and C5-6.  Mild multilevel facet DJD.     Unremarkable soft tissues.     Impression:     No acute abnormality.  No instability.     Mild and moderate multilevel DJD.        Electronically signed by: Marco Vásquez MD  Date:                                            08/18/2021  Time:                                           08:35           Narrative     EXAMINATION:  MRI LUMBAR SPINE WITHOUT CONTRAST    CLINICAL HISTORY:  lumbar ddd; Other intervertebral disc degeneration, lumbar region    TECHNIQUE:  Multiplanar, multisequence MR images were acquired from the thoracolumbar junction to the sacrum without the  administration of contrast.    COMPARISON:  08/23/2017    FINDINGS:  There is no evidence of fracture or marrow replacement process.  There is disc desiccation at all lumbar levels with disc space narrowing at multiple levels but most significant at L4-5.  Sagittal alignment is maintained.  Conus terminates at T12-L1.  Visualized retroperitoneal structures demonstrate no significant abnormalities.    T12-L1, L1-L2: Mild diffuse disc bulge with no significant central canal stenosis or neural foraminal narrowing.    L2-L3: Mild diffuse disc bulge with moderate facet arthropathy.  No significant central canal stenosis or neural foraminal narrowing.    L3-4: Mild diffuse disc bulge extending into both neural foramen with moderate facet arthropathy causing moderate right and mild left neural foraminal narrowing.  No significant central canal stenosis.    L4-5: Hemilaminectomy defect on the right.  There is a diffuse disc bulge extending into both neural foramen with a superimposed central extrusion with an annular fissure extending slightly below the level of the disc plane.  This causes mass effect on the lateral recess and may impinge on the descending nerve roots although the canal is decompressed posteriorly by the laminectomy defect with no significant central canal stenosis.  There is severe facet arthropathy contributing to severe bilateral neural foraminal narrowing.    L5-S1: There is severe facet arthropathy.  There is a diffuse disc bulge with no significant central canal stenosis.  There is severe bilateral neural foraminal narrowing..   Impression       Postoperative changes at L4 on the right with decompression of the central canal.  There is lumbar spondylosis most significant at L4-5 and L5-S1 where there is severe bilateral neural foraminal narrowing.  Specific details at each level are discussed above.      Electronically signed by: Quinton Goins MD  Date: 02/12/2019  Time: 10:10         Assessment:        Encounter Diagnoses   Name Primary?    Lumbar spondylosis Yes    DDD (degenerative disc disease), lumbar     Lumbar radiculopathy     Lumbar radiculopathy, chronic            Plan:       Driss was seen today for leg pain.    Diagnoses and all orders for this visit:    Lumbar spondylosis  -     MRI Lumbar Spine Without Contrast; Future  -     tiZANidine (ZANAFLEX) 4 MG tablet; Take 1 tablet (4 mg total) by mouth every 6 (six) hours as needed (pain).    DDD (degenerative disc disease), lumbar  -     MRI Lumbar Spine Without Contrast; Future  -     tiZANidine (ZANAFLEX) 4 MG tablet; Take 1 tablet (4 mg total) by mouth every 6 (six) hours as needed (pain).    Lumbar radiculopathy  -     MRI Lumbar Spine Without Contrast; Future  -     tiZANidine (ZANAFLEX) 4 MG tablet; Take 1 tablet (4 mg total) by mouth every 6 (six) hours as needed (pain).    Lumbar radiculopathy, chronic  -     MRI Lumbar Spine Without Contrast; Future  -     tiZANidine (ZANAFLEX) 4 MG tablet; Take 1 tablet (4 mg total) by mouth every 6 (six) hours as needed (pain).          Driss Hernandez Jr. is a 72 y.o. male with worsening chronic bilateral low back pain.  Also with relatively new or symptoms concerning for right L3 radicular pain.  No overt neurologic deficits noted on examination.     Pertinent imaging studies reviewed by me. Imaging results were discussed with patient.  Refill tizanidine.  Lumbar MRI to get updated imaging given new symptoms consistent with L3 radiculopathy.  Return to clinic after MRI to review results.  May consider epidural steroid injection if appropriate.

## 2023-07-14 NOTE — PROCEDURES
Large Joint Aspiration/Injection: R subacromial bursa    Date/Time: 7/13/2023 9:00 AM  Performed by: Valerie Olivera MD  Authorized by: Valerie Olivera MD     Consent Done?:  Yes (Verbal)  Indications:  Pain  Timeout: prior to procedure the correct patient, procedure, and site was verified    Prep: patient was prepped and draped in usual sterile fashion      Local anesthesia used?: Yes    Local anesthetic:  Topical anesthetic    Details:  Needle Size:  22 G  Approach:  Posterior  Location:  Shoulder  Site:  R subacromial bursa  Medications:  40 mg triamcinolone acetonide 40 mg/mL  Patient tolerance:  Patient tolerated the procedure well with no immediate complications

## 2023-07-17 ENCOUNTER — HOSPITAL ENCOUNTER (OUTPATIENT)
Dept: RADIOLOGY | Facility: HOSPITAL | Age: 73
Discharge: HOME | End: 2023-07-17
Attending: PAIN MEDICINE
Payer: MEDICARE

## 2023-07-17 ENCOUNTER — TELEPHONE (OUTPATIENT)
Dept: FAMILY MEDICINE | Facility: CLINIC | Age: 73
End: 2023-07-17
Payer: MEDICARE

## 2023-07-17 DIAGNOSIS — M54.16 LUMBAR RADICULOPATHY, CHRONIC: ICD-10-CM

## 2023-07-17 DIAGNOSIS — M51.36 DDD (DEGENERATIVE DISC DISEASE), LUMBAR: ICD-10-CM

## 2023-07-17 DIAGNOSIS — M47.816 LUMBAR SPONDYLOSIS: ICD-10-CM

## 2023-07-17 DIAGNOSIS — M54.16 LUMBAR RADICULOPATHY: ICD-10-CM

## 2023-07-17 DIAGNOSIS — G47.00 INSOMNIA, UNSPECIFIED TYPE: ICD-10-CM

## 2023-07-17 PROCEDURE — 72148 MRI LUMBAR SPINE W/O DYE: CPT | Mod: TC

## 2023-07-17 PROCEDURE — 72148 MRI LUMBAR SPINE WITHOUT CONTRAST: ICD-10-PCS | Mod: 26,,, | Performed by: RADIOLOGY

## 2023-07-17 PROCEDURE — 72148 MRI LUMBAR SPINE W/O DYE: CPT | Mod: 26,,, | Performed by: RADIOLOGY

## 2023-07-17 RX ORDER — METOCLOPRAMIDE 10 MG/1
10 TABLET ORAL EVERY 6 HOURS PRN
Qty: 30 TABLET | Refills: 0 | Status: SHIPPED | OUTPATIENT
Start: 2023-07-17 | End: 2023-09-20

## 2023-07-17 RX ORDER — TRIAZOLAM 0.25 MG/1
TABLET ORAL
Qty: 30 TABLET | Refills: 5 | Status: CANCELLED | OUTPATIENT
Start: 2023-07-17

## 2023-07-17 RX ORDER — TRIAZOLAM 0.25 MG/1
TABLET ORAL
Qty: 90 TABLET | Refills: 5 | Status: SHIPPED | OUTPATIENT
Start: 2023-07-17

## 2023-07-18 ENCOUNTER — OFFICE VISIT (OUTPATIENT)
Dept: PAIN MEDICINE | Facility: CLINIC | Age: 73
End: 2023-07-18
Payer: MEDICARE

## 2023-07-18 ENCOUNTER — TELEPHONE (OUTPATIENT)
Dept: PAIN MEDICINE | Facility: CLINIC | Age: 73
End: 2023-07-18

## 2023-07-18 VITALS
RESPIRATION RATE: 18 BRPM | HEART RATE: 59 BPM | OXYGEN SATURATION: 97 % | SYSTOLIC BLOOD PRESSURE: 173 MMHG | DIASTOLIC BLOOD PRESSURE: 77 MMHG

## 2023-07-18 DIAGNOSIS — M47.816 LUMBAR SPONDYLOSIS: ICD-10-CM

## 2023-07-18 DIAGNOSIS — M51.36 DDD (DEGENERATIVE DISC DISEASE), LUMBAR: Primary | ICD-10-CM

## 2023-07-18 DIAGNOSIS — M54.16 LUMBAR RADICULOPATHY: ICD-10-CM

## 2023-07-18 PROCEDURE — 3078F PR MOST RECENT DIASTOLIC BLOOD PRESSURE < 80 MM HG: ICD-10-PCS | Mod: CPTII,S$GLB,, | Performed by: PAIN MEDICINE

## 2023-07-18 PROCEDURE — 3066F PR DOCUMENTATION OF TREATMENT FOR NEPHROPATHY: ICD-10-PCS | Mod: CPTII,S$GLB,, | Performed by: PAIN MEDICINE

## 2023-07-18 PROCEDURE — 3077F PR MOST RECENT SYSTOLIC BLOOD PRESSURE >= 140 MM HG: ICD-10-PCS | Mod: CPTII,S$GLB,, | Performed by: PAIN MEDICINE

## 2023-07-18 PROCEDURE — 99999 PR PBB SHADOW E&M-EST. PATIENT-LVL V: ICD-10-PCS | Mod: PBBFAC,,, | Performed by: PAIN MEDICINE

## 2023-07-18 PROCEDURE — 3288F PR FALLS RISK ASSESSMENT DOCUMENTED: ICD-10-PCS | Mod: CPTII,S$GLB,, | Performed by: PAIN MEDICINE

## 2023-07-18 PROCEDURE — 3066F NEPHROPATHY DOC TX: CPT | Mod: CPTII,S$GLB,, | Performed by: PAIN MEDICINE

## 2023-07-18 PROCEDURE — 1159F MED LIST DOCD IN RCRD: CPT | Mod: CPTII,S$GLB,, | Performed by: PAIN MEDICINE

## 2023-07-18 PROCEDURE — 3288F FALL RISK ASSESSMENT DOCD: CPT | Mod: CPTII,S$GLB,, | Performed by: PAIN MEDICINE

## 2023-07-18 PROCEDURE — 3061F NEG MICROALBUMINURIA REV: CPT | Mod: CPTII,S$GLB,, | Performed by: PAIN MEDICINE

## 2023-07-18 PROCEDURE — 1125F PR PAIN SEVERITY QUANTIFIED, PAIN PRESENT: ICD-10-PCS | Mod: CPTII,S$GLB,, | Performed by: PAIN MEDICINE

## 2023-07-18 PROCEDURE — 1159F PR MEDICATION LIST DOCUMENTED IN MEDICAL RECORD: ICD-10-PCS | Mod: CPTII,S$GLB,, | Performed by: PAIN MEDICINE

## 2023-07-18 PROCEDURE — 1125F AMNT PAIN NOTED PAIN PRSNT: CPT | Mod: CPTII,S$GLB,, | Performed by: PAIN MEDICINE

## 2023-07-18 PROCEDURE — 99214 PR OFFICE/OUTPT VISIT, EST, LEVL IV, 30-39 MIN: ICD-10-PCS | Mod: S$GLB,,, | Performed by: PAIN MEDICINE

## 2023-07-18 PROCEDURE — 3044F PR MOST RECENT HEMOGLOBIN A1C LEVEL <7.0%: ICD-10-PCS | Mod: CPTII,S$GLB,, | Performed by: PAIN MEDICINE

## 2023-07-18 PROCEDURE — 3044F HG A1C LEVEL LT 7.0%: CPT | Mod: CPTII,S$GLB,, | Performed by: PAIN MEDICINE

## 2023-07-18 PROCEDURE — 3061F PR NEG MICROALBUMINURIA RESULT DOCUMENTED/REVIEW: ICD-10-PCS | Mod: CPTII,S$GLB,, | Performed by: PAIN MEDICINE

## 2023-07-18 PROCEDURE — 1101F PR PT FALLS ASSESS DOC 0-1 FALLS W/OUT INJ PAST YR: ICD-10-PCS | Mod: CPTII,S$GLB,, | Performed by: PAIN MEDICINE

## 2023-07-18 PROCEDURE — 99214 OFFICE O/P EST MOD 30 MIN: CPT | Mod: S$GLB,,, | Performed by: PAIN MEDICINE

## 2023-07-18 PROCEDURE — 1160F PR REVIEW ALL MEDS BY PRESCRIBER/CLIN PHARMACIST DOCUMENTED: ICD-10-PCS | Mod: CPTII,S$GLB,, | Performed by: PAIN MEDICINE

## 2023-07-18 PROCEDURE — 1160F RVW MEDS BY RX/DR IN RCRD: CPT | Mod: CPTII,S$GLB,, | Performed by: PAIN MEDICINE

## 2023-07-18 PROCEDURE — 99999 PR PBB SHADOW E&M-EST. PATIENT-LVL V: CPT | Mod: PBBFAC,,, | Performed by: PAIN MEDICINE

## 2023-07-18 PROCEDURE — 3078F DIAST BP <80 MM HG: CPT | Mod: CPTII,S$GLB,, | Performed by: PAIN MEDICINE

## 2023-07-18 PROCEDURE — 3077F SYST BP >= 140 MM HG: CPT | Mod: CPTII,S$GLB,, | Performed by: PAIN MEDICINE

## 2023-07-18 PROCEDURE — 1101F PT FALLS ASSESS-DOCD LE1/YR: CPT | Mod: CPTII,S$GLB,, | Performed by: PAIN MEDICINE

## 2023-07-18 NOTE — PROGRESS NOTES
Mr. Naqvi will be scheduled for right L5 TF ANNIE on 8/2/23- requests an arrival time of 8:30am.  Reviewed the pre-procedure instructions listed below with the patient- copy also provided.  Instructed patient to notify clinic if she begins feeling ill, runs a fever, is prescribed antibiotics, and/or has any outpatient procedures (colonoscopy, endoscopy, OBGYN, dental work, etc.), and/or any vaccinations or booster shots within the two weeks leading up to this procedure.  Instructed patient to report to [facility], 2nd Floor Endoscopy Registration Desk.  All questions answered- patient verbalized understanding. Clearance request to hold Plavix x5 and Eliquis x3 days sent to Dr. Purdy.    Day of Procedure  Ensure you have obtained an arrival time from the Pain Management Staff  You will be contacted the week before your scheduled date to review these instructions and receive your arrival time.  Please check any voicemails received.  If you arrive past your scheduled procedure time, you may be asked to reschedule your procedure.    Ensure you have a  with you.  If you arrive without a responsible adult to stay with you and drive you home, you may be asked to reschedule your procedure.     Take all of your prescribed medications that you routinely take for blood pressure, heart medications, thyroid, cholesterol, etc.  You will be informed if blood thinners should be held.    This is NOT a fasting procedure, you may eat the day of your procedure.    Wear comfortable clothing (loose fitting pants).  You may wear glasses, dentures, contact lenses, and/or hearing aids.     Notify the nurse during the intake process if you are allergic to any medications, if you are diabetic, or if you are not feeling well      Diabetic Patients  Please check your blood sugar prior to coming in for your procedure.  If the value is greater than 200, contact the clinic [phone] as the procedure may need to be  rescheduled.  The procedure may not be performed if your blood sugar is above 200 even after checking into the hospital.      IF you are taking blood thinners, please make sure our staff is aware so that we can obtain clearance and have you hold the medication accordingly.

## 2023-07-18 NOTE — TELEPHONE ENCOUNTER
Informed Mr. Naqvi that we did not contact him today, but I did relay the msg from Dr. Willoughby's staff re: prescription.

## 2023-07-18 NOTE — TELEPHONE ENCOUNTER
No care due was identified.  [facility] Embedded Care Due Messages. Reference number: [id number].   7/17/2023 8:44:45 PM CDT

## 2023-07-18 NOTE — TELEPHONE ENCOUNTER
Done yesterday    Let pt know pharmacy has the refill      triazolam (HALCION) 0.25 MG Tab 90 tablet 5 7/17/2023  No   Sig: TAKE 1 TABLET BY MOUTH EVERY NIGHT AT BEDTIME AS NEEDED   Sent to pharmacy as: triazolam (HALCION) 0.25 MG Tab   Class: Normal   Order: [id number]   Date/Time Signed: 7/17/2023 21:49       E-Prescribing Status: Receipt confirmed by pharmacy (7/17/2023  9:50 PM CDT)   Prior authorization: Payer Waiting for Response     Pharmacy    [id number] - [place], [place] - [address] AT [facility]

## 2023-07-18 NOTE — TELEPHONE ENCOUNTER
----- Message from Chaya Bryant sent at 7/18/2023  2:55 PM CDT -----  .Type: Patient Call Back    Who called:self     What is the request in detail: patient is requesting a callback states he missed a call but doesn't know what the call is in regards to    Can the clinic reply by [facility]?  call    Would the patient rather a call back or a response via [facility]? call    Best call back number: [phone]    Additional Information:

## 2023-07-18 NOTE — PROGRESS NOTES
Subjective:     Patient ID: Driss Hernandez Jr. is a 72 y.o. male    Chief Complaint: Follow-up (MRI Review 7/17)      Referred by: No ref. provider found      HPI:    Interval History (7/18/23):  He returns today for follow up and MRI review.  He reports that his pain is unchanged in quality and location since last encounter.  He denies any new or worsening symptoms.      Interval History (7/14/23):  He returns today for follow up.  He reports that he has been having worsening low back and lower extremity pain.  States he continues to have bilateral low back pain as before though it has worsened since last encounter.  Also states that he is now having right-sided hip and thigh pain associated with his back pain.  These symptoms have been present for roughly 6 months.  This is typically present when sitting for prolonged periods of time.  States when he gets up he states that his thigh feels like it is being twisted.  The pain extends in a distribution consistent with the L3 dermatome.  Denies any associated paresthesias.  Does feel that the right lower extremity is weak when getting up after sitting though strength will return to normal after a few minutes.  Denies any associated bowel or bladder dysfunction.      Interval History NP (5/17/2022):  Mr Hernandez presents for delayed FU. Returning lower back pain where TPIs have done well in past and requesting repeat. He will be having shoulder surgery upcoming. Denies new areas of pain or neurological changes. No focal voicing of  myelopathic symptoms such as handwriting changes or difficulty with buttons/coins/keys. Denies perineal paresthesias, bowel/bladder dysfunction. Currently taking neurontin and zanaflex being taken minimally at this time.       Interval History (9/22/21):  He returns today for follow up.  He reports that TPIs done at last visit have been helpful. His low back is doing well and does not need any further attention at this time. He does  continue to have intermittent severe right shoulder pain. He denies any changes in the quality or location of this pain since last encounter. He denies any new or worsened symptoms.       Interval History (8/19/21):  He returns today for follow up.  He reports that physical therapy has been helpful for the right neck and shoulder pain.  He states that his neck pain was never very severe and has now essentially resolved.  He states that the vast majority of his pain is located right anterior shoulder.  The pain is exacerbated with certain movements and is particularly bothersome while sleeping.  He denies any associated numbness tingling with right shoulder pain.  Patient also states that he has recurrent low back pain.  Similar in quality and location to previous back pain that was well treated with trigger point injections.  He would like repeat trigger point injections done today      Interval History (6/4/21):  He returns today for follow up.  He reports that he has had right-sided neck and upper extremity pain for the past 2 weeks.  He states the pain started after handing his wife a heavy flower pot.  The pain is located the right lower cervical paraspinal region radiate to the right wrist with associated numbness and tingling in the fingers of the right hand.  He denies any focal weakness or bowel bladder dysfunction.  The pain is constant worse with activity.  The pain disrupts his sleep.       Interval History (1/6/20):  He returns today for follow up.  He reports that lumbar trigger point injections have been helpful for the bilateral low back pain. He reports greater than 85% relief for over 9 months.  He states the pain has returned.  He denies any changes in the quality or location was pain. He would like repeat trigger point injections today.      Interval History (3/18/19):  He returns today for follow up.  He reports that trigger point injections have been helpful for the right-sided low back pain. He  reports near complete resolution of the sharp right-sided low back pain. He does report that his previous low back pain starting to return.  He feels as though his current pain is exact same pain that was previously helped by sacroiliac joint injections.  He is interested in repeat injections if appropriate.      Interval History (2/12/19):  He returns today for follow up and imaging review.  He reports that his pain is unchanged since last encounter.  He denies any new or worsening neurologic symptoms.      Interval History (2/7/19):  He returns today for follow up.  He reports that he has been having acute right-sided low back pain for about 1 week.  He denies any specific inciting event or injury.  The pain is located in the lateral aspect of the right lumbosacral region.  It does not radiate.  He denies any associated numbness, tingling, weakness, bowel bladder dysfunction.  The pain varies in intensity from day-to-day.  The pain is much worse with coughing and sneezing and sitting with a forward flexed posture.  He is still able to go to the gym at times.      Interval History (12/17/18):  He returns today for follow up.  He reports that bilateral SIJ injections has been helpful for the low back pain. He reports about 80% relief. He does not feel that he needs any further interventions at this time      Interval History (11/19/18):  He returns today for follow up.  He reports that bilateral lumbar medial branch blocks were not helpful. Pain is unchanged in quality and location since last visit. He denies any new symptoms.       Interval History (10/16/18):  He returns today for follow up.  He reports that he has continued to have bilateral low back pain. He still has some right lower extremity pain, but this is not as bothersome as his low back pain. The pain is located across the lower lumbar region R>L. It does not radiate. It is not associated with any numbness, tingling, weakness or b/b dysfunction. The  pain is worse with activity. He also requests refills of tizanidine.      Interval History (12/18/17):  He returns today for follow up.  He reports that right L4 TFESI has been helpful for the right lumbar radicular pain. He reports 100% relief. He still has some lower back pain, but would prefer to discuss this later. Otherwise he is doing well.       Interval History (11/13/17):  He returns today for follow up.  He reports that PT has been helpful for the low back pain. He still has significant right foot and ankle pain when sitting in a reclined postion. This is the most painful area. He also has low back pain that is constant but does not bother him as much. Otherwise he is doing well.       Driss Hernandez Jr. is a 72 y.o. male who presents today with chronic bilateral low back pain R>>>L. Pain started over 40 years ago. No inciting injury or event noted. Pain is located mainly on the right side of the lower lumbar paraspinals. About 5 weeks ago, patient began to have right lwoer extremity pain that he felt was related to his back pain, but this has subsided. He denies any numbness, tingling, weakness, or b/b dysfunction. The pain is described as dull, but can become sharp at times. Patient states that his pain is worse in the morning. He does not sleep well. States that he wakes up every 45 minutes. Has taken Tizanidine PRN in the pat, but cannot recall how it affected him. Probably has not taken in over a year. Cannot take NSAIDs due to decreased renal function. Has taken in the past with mild relief.  This pain is described in detail below.    Physical Therapy:  Yes.    Non-pharmacologic Treatment: Nothing really helps         TENS? No    Pain Medications:         Currently taking: Gabapentin, Tizandine. Tylenol p.r.n., Celebrex    Has tried in the past:  NSAIDs    Has not tried: Opioids, Tylenol, TCAs, SNRIs, topical creams    Blood thinners:  Plavix, Eliquis    Interventional Therapies:   11/29/17 -  Right L4 TFESI - 100% relief  10/28/18 - bilateral SIJ injections -  80% relief  11/14/18 - Bilateral L2, L3, L4 and L5 MBBs - No relief noted  3/20/19 - bilateral sacroiliac joint steroid injections    Relevant Surgeries: Previous right L4 hemilaminectomy    Affecting sleep? Yes    Affecting daily activities? yes    Depressive symptoms? no          SI/HI? No    Work status: Retired    Pain Scores:    Best:       3/10  Worst:     9/10  Usually:   5/10  Today:    3/10    Review of Systems   Constitutional:  Negative for activity change, appetite change, chills, fatigue, fever and unexpected weight change.   HENT:  Negative for hearing loss.    Eyes:  Negative for visual disturbance.   Respiratory:  Negative for chest tightness and shortness of breath.    Cardiovascular:  Negative for chest pain.   Gastrointestinal:  Negative for abdominal pain, constipation, diarrhea, nausea and vomiting.   Genitourinary:  Negative for difficulty urinating.   Musculoskeletal:  Positive for back pain, gait problem and myalgias. Negative for neck pain.   Skin:  Negative for rash.   Neurological:  Positive for weakness. Negative for dizziness, light-headedness, numbness and headaches.   Psychiatric/Behavioral:  Positive for sleep disturbance. Negative for hallucinations and suicidal ideas. The patient is not nervous/anxious.      Past Medical History:   Diagnosis Date    Chronic heart failure with preserved ejection fraction 2/9/2023    Chronic midline low back pain without sciatica 10/2/2017    Colon polyps     Coronary artery disease involving native coronary artery of native heart 3/5/2020    Coronary artery disease involving native coronary artery of native heart with angina pectoris 12/10/2020    Diabetes mellitus with neurological manifestations, uncontrolled 1/24/2017    Diabetic polyneuropathy associated with type 2 diabetes mellitus 1/24/2017    Diabetic polyneuropathy associated with type 2 diabetes mellitus     Essential  hypertension 1/24/2017    Gastroesophageal reflux disease 1/24/2017    Hyperlipidemia 1/24/2017    Insomnia 1/24/2017    Long-term insulin use 1/24/2017    Longstanding persistent atrial fibrillation 12/30/2020    Lumbar spondylosis 11/13/2017    Nuclear sclerosis of both eyes 8/24/2017    Obesity     PAD (peripheral artery disease) 3/23/2022    Stage 3 chronic kidney disease 2/13/2019    Uncontrolled type 2 diabetes mellitus without complication, with long-term current use of insulin 1/24/2017       Past Surgical History:   Procedure Laterality Date    ARTHROSCOPIC REPAIR OF ROTATOR CUFF OF SHOULDER Right 7/5/2022    Procedure: REPAIR, ROTATOR CUFF, ARTHROSCOPIC;  Surgeon: Valerie Olivera MD;  Location: [facility] OR;  Service: Orthopedics;  Laterality: Right;  HETAL LEMUS [phone]/ TEXTED ALLAN ON 6/23/2022 @ 9:47AM. ALLAN RESPONDED ON 6/23/2022 @ 10:33AM-LO  JEAN PAUL BABIN [phone] MY BE HERE FOR CASE SPOKE TO HIM @ 9:59AM ON 7-1-2022  RN PREOP 6/28/2022    BACK SURGERY      2002    CATARACT EXTRACTION W/  INTRAOCULAR LENS IMPLANT Right 08/29/2017    Dr. Hong    CATARACT EXTRACTION W/  INTRAOCULAR LENS IMPLANT Left 02/24/2022    Dr. Hong    COLONOSCOPY N/A 2/21/2017    Procedure: COLONOSCOPY;  Surgeon: Robb Rosado MD;  Location: [facility] ENDO;  Service: Endoscopy;  Laterality: N/A;    COLONOSCOPY N/A 7/5/2023    Procedure: COLONOSCOPY;  Surgeon: Aston Varela MD;  Location: [facility] ENDO;  Service: Endoscopy;  Laterality: N/A;  Referral: JOVANNI SCANLON  ok to hold Plavix 5 days and Eliquis 2 days per Dr Purdy-OFELIA   Meds  Suprep   Inst portal   LW    CORONARY ANGIOGRAPHY INCLUDING BYPASS GRAFTS WITH CATHETERIZATION OF LEFT HEART N/A 11/11/2020    Procedure: ANGIOGRAM, CORONARY, INCLUDING BYPASS GRAFT, WITH LEFT HEART CATHETERIZATION;  Surgeon: Lane Cuellar MD;  Location: [facility] CATH LAB;  Service: Cardiology;  Laterality: N/A;  RN PRE OP 11-5-2020. --COVID NEGATIVE ON   11-. C A    CORONARY ARTERY BYPASS GRAFT      March 2016    EPIDURAL STEROID INJECTION Bilateral 11/14/2018    Procedure: Lumbar Medial Branch Blocks;  Surgeon: Eze Byrd Jr., MD;  Location: [facility] ENDO;  Service: Pain Management;  Laterality: Bilateral;  Bilateral Lumbar Medial Branch Blocks L2, L3, L4, L5    [id number]  [id number]    Arrive @ 1150; NO Sedation    EPIDURAL STEROID INJECTION Bilateral 11/28/2018    Procedure: Injection, Steroid, Epidural;  Surgeon: Eze Byrd Jr., MD;  Location: [facility] ENDO;  Service: Pain Management;  Laterality: Bilateral;  Bilateral Sacroiliac Joint Steroid Injections    [id number]    Arrive @ 0910    EPIDURAL STEROID INJECTION Bilateral 3/20/2019    Procedure: Injection, Steroid, Sacroiliiac Joint;  Surgeon: Eze Byrd Jr., MD;  Location: [facility] ENDO;  Service: Pain Management;  Laterality: Bilateral;  Bilateral Sacroiliac Joint Steroid Injections    [id number]    Arrive @ 1145; Trigger point injections also?    INTRAOCULAR PROSTHESES INSERTION Left 2/24/2022    Procedure: INSERTION, IOL PROSTHESIS;  Surgeon: Nickolas Hong MD;  Location: [facility] OR;  Service: Ophthalmology;  Laterality: Left;    PHACOEMULSIFICATION OF CATARACT Left 2/24/2022    Procedure: PHACOEMULSIFICATION, CATARACT;  Surgeon: Nickolas Hong MD;  Location: [facility] OR;  Service: Ophthalmology;  Laterality: Left;  RN Phone Pre Op 2-16-22.  Covid NEGATIVE  2-23-22.  Arrival 08:00 am.    TONSILLECTOMY         Social History     Socioeconomic History    Marital status:    Tobacco Use    Smoking status: Never     Passive exposure: Never    Smokeless tobacco: Never   Substance and Sexual Activity    Alcohol use: Yes     Comment: rare occassion    Drug use: No    Sexual activity: Yes     Partners: Female     Social Determinants of Health     Financial Resource Strain: Low Risk     Difficulty of Paying Living Expenses: Not hard at all   Food Insecurity: No Food Insecurity    Worried About  "Running Out of Food in the Last Year: Never true    Ran Out of Food in the Last Year: Never true   Transportation Needs: No Transportation Needs    Lack of Transportation (Medical): No    Lack of Transportation (Non-Medical): No   Physical Activity: Insufficiently Active    Days of Exercise per Week: 3 days    Minutes of Exercise per Session: 30 min   Stress: No Stress Concern Present    Feeling of Stress : Only a little   Social Connections: Moderately Integrated    Frequency of Communication with Friends and Family: More than three times a week    Frequency of Social Gatherings with Friends and Family: Three times a week    Attends Qu[employer] Services: Never    Active Member of Clubs or Organizations: Yes    Attends Club or Organization Meetings: More than 4 times per year    Marital Status:    Housing Stability: Low Risk     Unable to Pay for Housing in the Last Year: No    Number of Places Lived in the Last Year: 1    Unstable Housing in the Last Year: No       Review of patient's allergies indicates:   Allergen Reactions    Penicillins Other (See Comments)     Unknown reaction, Had a reaction as a child    Shrimp Itching     Hand itching       Current Outpatient Medications on File Prior to Visit   Medication Sig Dispense Refill    albuterol (PROVENTIL/VENTOLIN HFA) 90 mcg/actuation inhaler Inhale 2 puffs into the lungs every 4 (four) hours as needed for Shortness of Breath. Rescue 17 g 3    apixaban (ELIQUIS) 5 mg Tab Take 1 tablet (5 mg total) by mouth 2 (two) times daily. 180 tablet 3    ascorbic acid, vitamin C, (VITAMIN C) 100 MG tablet Take 100 mg by mouth daily as needed.      atorvastatin (LIPITOR) 80 MG tablet Take 1 tablet (80 mg total) by mouth every evening. 90 tablet 3    b complex vitamins tablet Take 1 tablet by mouth once daily.      BD ALCOHOL SWABS PadM       BD ULTRA-FINE ROLAND PEN NEEDLES 32 gauge x 5/32" Ndle       blood sugar diagnostic Strp Check blood glucose 2 times a day. True " metrix. E11.65 200 strip 3    blood-glucose meter kit Test glucose 2-3x/day. True metrix 1 each 0    celecoxib (CELEBREX) 100 MG capsule TAKE 1 CAPSULE(100 MG) BY MOUTH EVERY DAY 30 capsule 6    clopidogreL (PLAVIX) 75 mg tablet TAKE 1 TABLET(75 MG) BY MOUTH EVERY DAY 90 tablet 3    coenzyme Q10 100 mg capsule Take 100 mg by mouth once daily.      dapagliflozin (FARXIGA) 5 mg Tab tablet Take 1 tablet (5 mg total) by mouth once daily. 30 tablet 3    ergocalciferol (VITAMIN D2) 50,000 unit Cap TAKE ONE CAPSULE BY MOUTH WEEKLY 12 capsule 2    fenofibrate 160 MG Tab TAKE 1 TABLET EVERY MORNING 90 tablet 1    furosemide (LASIX) 20 MG tablet TAKE 1 TABLET TWICE DAILY 180 tablet 3    gabapentin (NEURONTIN) 100 MG capsule TAKE 2 CAPSULES EVERY MORNING AND TAKE 3 CAPSULES EVERY DAY WITH DINNER 450 capsule 12    glimepiride (AMARYL) 2 MG tablet TAKE 1 TABLET EVERY DAY BEFORE BREAKFAST 90 tablet 2    hydrALAZINE (APRESOLINE) 50 MG tablet Take 1 tablet (50 mg total) by mouth 2 (two) times a day. 180 tablet 3    HYDROcodone-acetaminophen (NORCO) 5-325 mg per tablet Take 1 tablet by mouth every 8 (eight) hours as needed for Pain. 15 tablet 0    insulin degludec (TRESIBA FLEXTOUCH U-100) 100 unit/mL (3 mL) insulin pen Inject 20 Units into the skin once daily. 30 mL 4    isosorbide mononitrate (IMDUR) 60 MG 24 hr tablet Take 1 tablet (60 mg total) by mouth once daily. 90 tablet 11    ketoconazole (NIZORAL) 2 % cream Apply topically once daily. 60 g 6    lancets Misc Check blood glucose 2x/day. True metrix. E11.65 200 each 3    magnesium 250 mg Tab Take 1 tablet by mouth once daily.       metFORMIN (GLUCOPHAGE-XR) 500 MG ER 24hr tablet Take 1 tablet with breakfast and 2 tablets with supper. 270 tablet 2    metoclopramide HCl (REGLAN) 10 MG tablet Take 1 tablet (10 mg total) by mouth every 6 (six) hours as needed (Nausea). 30 tablet 0    metoclopramide HCl (REGLAN) 10 MG tablet Take 1 tablet (10 mg total) by mouth every 6 (six) hours  as needed (Nausea). 30 tablet 0    nitroGLYCERIN (NITROSTAT) 0.4 MG SL tablet Place 1 tablet (0.4 mg total) under the tongue every 5 (five) minutes as needed for Chest pain. 25 tablet 11    omega-3 acid ethyl esters (LOVAZA) 1 gram capsule Take 2 capsules (2 g total) by mouth 2 (two) times daily. 360 capsule 3    ondansetron (ZOFRAN-ODT) 4 MG TbDL Dissolve 1 tablet (4 mg total) by mouth every 6 (six) hours as needed (nausea). 10 tablet 0    pantoprazole (PROTONIX) 40 MG tablet TAKE 1 TABLET EVERY DAY 90 tablet 3    pantoprazole (PROTONIX) 40 MG tablet Take 1 tablet (40 mg total) by mouth once daily. 30 tablet 0    semaglutide (OZEMPIC) 2 mg/dose (8 mg/3 mL) PnIj Inject 2 mg into the skin every 7 days. 4 each 3    semaglutide (OZEMPIC) 2 mg/dose (8 mg/3 mL) PnIj Inject 2 mg into the skin every 7 days. 1 each 0    tiZANidine (ZANAFLEX) 4 MG tablet Take 1 tablet (4 mg total) by mouth every 6 (six) hours as needed (pain). 360 tablet 3    triazolam (HALCION) 0.25 MG Tab TAKE 1 TABLET BY MOUTH EVERY NIGHT AT BEDTIME AS NEEDED 90 tablet 5    TRUEPLUS LANCETS 33 gauge [facility]        Current Facility-Administered Medications on File Prior to Visit   Medication Dose Route Frequency Provider Last Rate Last Admin    cyclopentolate 1% ophthalmic solution 1 drop  1 drop Left Eye On Call Procedure Nickolas Hong MD   1 drop at 02/24/22 0901    ofloxacin 0.3 % ophthalmic solution 1 drop  1 drop Left Eye On Call Procedure Nickolas Hong MD   2 drop at 02/24/22 1017    sodium chloride 0.9% flush 10 mL  10 mL Intravenous PRN Nickolas Hong MD        triamcinolone acetonide injection 40 mg  40 mg Intra-articular  Valerie Olivera MD   40 mg at 02/09/23 1030       Objective:      BP (!) 173/77 (BP Location: Left arm, Patient Position: Sitting, BP Method: Medium (Automatic))   Pulse (!) 59   Resp 18   SpO2 97%     Exam:  GEN:  Well developed, well nourished.  No acute distress.   HEENT:  No trauma.  Mucous  membranes moist.  Nares patent bilaterally.  PSYCH: Normal affect. Thought content appropriate.  CHEST:  Breathing symmetric.  No audible wheezing.  ABD: Soft, non-distended.  SKIN:  Warm, pink, dry.  No rash on exposed areas.    EXT:  No cyanosis, clubbing, or edema.  No color change or changes in nail or hair growth.  NEURO/MUSCULOSKELETAL:  Fully alert, oriented, and appropriate. Speech normal ivania. No cranial nerve deficits.   Gait: Normal normal.  No focal motor deficits.                 Imaging:    Narrative & Impression    EXAMINATION:  MRI LUMBAR SPINE WITHOUT CONTRAST     CLINICAL HISTORY:  Lumbar radiculopathy, symptoms persist with conservative treatment; Spondylosis without myelopathy or radiculopathy, lumbar region     TECHNIQUE:  Multiplanar, multisequence MR images were acquired from the thoracolumbar junction to the sacrum without the administration of contrast.     COMPARISON:  02/11/2019.     FINDINGS:  There is susceptibility weighted artifact within the midline lower lumbar spine, suggestive of prior instrumentation.     The lumbar alignment is within normal limits.  There are scattered Schmorl's nodes.  The vertebral body heights are maintained.  The bone marrow signal is within normal limits.     The conus terminates at the level of T12.  The cauda equina nerve roots are within normal limits.     There is multilevel disc desiccation.  Evaluation of the individual disc levels reveals the following:     L1-L2, there is a disc bulge along with facet hypertrophy and ligamentum flavum hypertrophy.  The spinal canal and neural foramina are unremarkable.     L2-L3, there is a disc bulge along with facet hypertrophy and ligamentum flavum hypertrophy.  The spinal canal and neural foramina are unremarkable There is small amount of fluid within the facet joints.     L3-L4, there is diffuse disc bulge along with facet hypertrophy and ligamentum flavum.  The spinal canal is within normal limits.  There  is mild spinal canal narrowing.     L4-L5, there is diffuse disc bulge along with facet hypertrophy and ligamentum flavum hypertrophy.  There is superimposed central disc protrusion.  There is marked narrowing of the lateral recesses.  There is mild to moderate narrowing the spinal canal.  There is moderate bilateral neural foraminal narrowing.     L5-S1, there is diffuse disc bulge along with facet hypertrophy and ligamentum flavum.  The spinal canal is within normal limits.  There is moderate neural foraminal narrowing.     The paraspinal soft tissues are unremarkable.     Impression:     Changes of prior instrumentation in the lower lumbar spine at the L4-L5 level.  Unchanged appearance of soft tissue at the prior disc site at the L4-L5 level.     Mild to moderate narrowing of the spinal canal at the L4-L5 level.     Mild-to-moderate neural foraminal narrowing the lower lumbar spine as above.        Electronically signed by: Kevin Erickson MD  Date:                                            07/18/2023  Time:                                           09:04       Narrative & Impression    EXAMINATION:  XR CERVICAL SPINE AP LAT WITH FLEX EXTEN     CLINICAL HISTORY:  Other cervical disc degeneration, unspecified cervical region     TECHNIQUE:  Three views of the cervical spine plus flexion and extension views were performed.     COMPARISON:  None     FINDINGS:  Cervical spine is normal in alignment, vertebral body heights are maintained.  No displaced fracture or subluxation.  No suspicious bone lesion.     No instability between the flexion and extension images.     Mild and moderate multilevel degenerative disc disease and uncovertebral DJD.  Anterior osteophytosis at levels C2-3, C4-5, and C5-6.  Mild multilevel facet DJD.     Unremarkable soft tissues.     Impression:     No acute abnormality.  No instability.     Mild and moderate multilevel DJD.        Electronically signed by: Marco Vásquez MD  Date:                                             08/18/2021  Time:                                           08:35           Narrative     EXAMINATION:  MRI LUMBAR SPINE WITHOUT CONTRAST    CLINICAL HISTORY:  lumbar ddd; Other intervertebral disc degeneration, lumbar region    TECHNIQUE:  Multiplanar, multisequence MR images were acquired from the thoracolumbar junction to the sacrum without the administration of contrast.    COMPARISON:  08/23/2017    FINDINGS:  There is no evidence of fracture or marrow replacement process.  There is disc desiccation at all lumbar levels with disc space narrowing at multiple levels but most significant at L4-5.  Sagittal alignment is maintained.  Conus terminates at T12-L1.  Visualized retroperitoneal structures demonstrate no significant abnormalities.    T12-L1, L1-L2: Mild diffuse disc bulge with no significant central canal stenosis or neural foraminal narrowing.    L2-L3: Mild diffuse disc bulge with moderate facet arthropathy.  No significant central canal stenosis or neural foraminal narrowing.    L3-4: Mild diffuse disc bulge extending into both neural foramen with moderate facet arthropathy causing moderate right and mild left neural foraminal narrowing.  No significant central canal stenosis.    L4-5: Hemilaminectomy defect on the right.  There is a diffuse disc bulge extending into both neural foramen with a superimposed central extrusion with an annular fissure extending slightly below the level of the disc plane.  This causes mass effect on the lateral recess and may impinge on the descending nerve roots although the canal is decompressed posteriorly by the laminectomy defect with no significant central canal stenosis.  There is severe facet arthropathy contributing to severe bilateral neural foraminal narrowing.    L5-S1: There is severe facet arthropathy.  There is a diffuse disc bulge with no significant central canal stenosis.  There is severe bilateral neural foraminal  narrowing..   Impression       Postoperative changes at L4 on the right with decompression of the central canal.  There is lumbar spondylosis most significant at L4-5 and L5-S1 where there is severe bilateral neural foraminal narrowing.  Specific details at each level are discussed above.      Electronically signed by: Quinton Goins MD  Date: 02/12/2019  Time: 10:10         Assessment:       Encounter Diagnoses   Name Primary?    DDD (degenerative disc disease), lumbar Yes    Lumbar radiculopathy     Lumbar spondylosis              Plan:       Driss was seen today for follow-up.    Diagnoses and all orders for this visit:    DDD (degenerative disc disease), lumbar  -     Case Request Endoscopy: Right L5 Transforaminal Epidural Steroid Injection    Lumbar radiculopathy  -     Case Request Endoscopy: Right L5 Transforaminal Epidural Steroid Injection    Lumbar spondylosis            Driss Hernandez . is a 72 y.o. male with worsening chronic bilateral low back pain.  Also with relatively new or symptoms concerning for right L3/L4 radicular pain.  No overt neurologic deficits noted on examination.  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  This is chronic.     Pertinent imaging studies reviewed by me. Imaging results were discussed with patient.  Schedule for right L5 TFESI.   Return to clinic after procedure to discuss results.

## 2023-07-18 NOTE — H&P (VIEW-ONLY)
Subjective:     Patient ID: Driss Hernandez Jr. is a 72 y.o. male    Chief Complaint: Follow-up (MRI Review 7/17)      Referred by: No ref. provider found      HPI:    Interval History (7/18/23):  He returns today for follow up and MRI review.  He reports that his pain is unchanged in quality and location since last encounter.  He denies any new or worsening symptoms.      Interval History (7/14/23):  He returns today for follow up.  He reports that he has been having worsening low back and lower extremity pain.  States he continues to have bilateral low back pain as before though it has worsened since last encounter.  Also states that he is now having right-sided hip and thigh pain associated with his back pain.  These symptoms have been present for roughly 6 months.  This is typically present when sitting for prolonged periods of time.  States when he gets up he states that his thigh feels like it is being twisted.  The pain extends in a distribution consistent with the L3 dermatome.  Denies any associated paresthesias.  Does feel that the right lower extremity is weak when getting up after sitting though strength will return to normal after a few minutes.  Denies any associated bowel or bladder dysfunction.      Interval History NP (5/17/2022):  Mr Hernandez presents for delayed FU. Returning lower back pain where TPIs have done well in past and requesting repeat. He will be having shoulder surgery upcoming. Denies new areas of pain or neurological changes. No focal voicing of  myelopathic symptoms such as handwriting changes or difficulty with buttons/coins/keys. Denies perineal paresthesias, bowel/bladder dysfunction. Currently taking neurontin and zanaflex being taken minimally at this time.       Interval History (9/22/21):  He returns today for follow up.  He reports that TPIs done at last visit have been helpful. His low back is doing well and does not need any further attention at this time. He does  continue to have intermittent severe right shoulder pain. He denies any changes in the quality or location of this pain since last encounter. He denies any new or worsened symptoms.       Interval History (8/19/21):  He returns today for follow up.  He reports that physical therapy has been helpful for the right neck and shoulder pain.  He states that his neck pain was never very severe and has now essentially resolved.  He states that the vast majority of his pain is located right anterior shoulder.  The pain is exacerbated with certain movements and is particularly bothersome while sleeping.  He denies any associated numbness tingling with right shoulder pain.  Patient also states that he has recurrent low back pain.  Similar in quality and location to previous back pain that was well treated with trigger point injections.  He would like repeat trigger point injections done today      Interval History (6/4/21):  He returns today for follow up.  He reports that he has had right-sided neck and upper extremity pain for the past 2 weeks.  He states the pain started after handing his wife a heavy flower pot.  The pain is located the right lower cervical paraspinal region radiate to the right wrist with associated numbness and tingling in the fingers of the right hand.  He denies any focal weakness or bowel bladder dysfunction.  The pain is constant worse with activity.  The pain disrupts his sleep.       Interval History (1/6/20):  He returns today for follow up.  He reports that lumbar trigger point injections have been helpful for the bilateral low back pain. He reports greater than 85% relief for over 9 months.  He states the pain has returned.  He denies any changes in the quality or location was pain. He would like repeat trigger point injections today.      Interval History (3/18/19):  He returns today for follow up.  He reports that trigger point injections have been helpful for the right-sided low back pain. He  reports near complete resolution of the sharp right-sided low back pain. He does report that his previous low back pain starting to return.  He feels as though his current pain is exact same pain that was previously helped by sacroiliac joint injections.  He is interested in repeat injections if appropriate.      Interval History (2/12/19):  He returns today for follow up and imaging review.  He reports that his pain is unchanged since last encounter.  He denies any new or worsening neurologic symptoms.      Interval History (2/7/19):  He returns today for follow up.  He reports that he has been having acute right-sided low back pain for about 1 week.  He denies any specific inciting event or injury.  The pain is located in the lateral aspect of the right lumbosacral region.  It does not radiate.  He denies any associated numbness, tingling, weakness, bowel bladder dysfunction.  The pain varies in intensity from day-to-day.  The pain is much worse with coughing and sneezing and sitting with a forward flexed posture.  He is still able to go to the gym at times.      Interval History (12/17/18):  He returns today for follow up.  He reports that bilateral SIJ injections has been helpful for the low back pain. He reports about 80% relief. He does not feel that he needs any further interventions at this time      Interval History (11/19/18):  He returns today for follow up.  He reports that bilateral lumbar medial branch blocks were not helpful. Pain is unchanged in quality and location since last visit. He denies any new symptoms.       Interval History (10/16/18):  He returns today for follow up.  He reports that he has continued to have bilateral low back pain. He still has some right lower extremity pain, but this is not as bothersome as his low back pain. The pain is located across the lower lumbar region R>L. It does not radiate. It is not associated with any numbness, tingling, weakness or b/b dysfunction. The  pain is worse with activity. He also requests refills of tizanidine.      Interval History (12/18/17):  He returns today for follow up.  He reports that right L4 TFESI has been helpful for the right lumbar radicular pain. He reports 100% relief. He still has some lower back pain, but would prefer to discuss this later. Otherwise he is doing well.       Interval History (11/13/17):  He returns today for follow up.  He reports that PT has been helpful for the low back pain. He still has significant right foot and ankle pain when sitting in a reclined postion. This is the most painful area. He also has low back pain that is constant but does not bother him as much. Otherwise he is doing well.       Driss Hernandez Jr. is a 72 y.o. male who presents today with chronic bilateral low back pain R>>>L. Pain started over 40 years ago. No inciting injury or event noted. Pain is located mainly on the right side of the lower lumbar paraspinals. About 5 weeks ago, patient began to have right lwoer extremity pain that he felt was related to his back pain, but this has subsided. He denies any numbness, tingling, weakness, or b/b dysfunction. The pain is described as dull, but can become sharp at times. Patient states that his pain is worse in the morning. He does not sleep well. States that he wakes up every 45 minutes. Has taken Tizanidine PRN in the pat, but cannot recall how it affected him. Probably has not taken in over a year. Cannot take NSAIDs due to decreased renal function. Has taken in the past with mild relief.  This pain is described in detail below.    Physical Therapy:  Yes.    Non-pharmacologic Treatment: Nothing really helps         TENS? No    Pain Medications:         Currently taking: Gabapentin, Tizandine. Tylenol p.r.n., Celebrex    Has tried in the past:  NSAIDs    Has not tried: Opioids, Tylenol, TCAs, SNRIs, topical creams    Blood thinners:  Plavix, Eliquis    Interventional Therapies:   11/29/17 -  Right L4 TFESI - 100% relief  10/28/18 - bilateral SIJ injections -  80% relief  11/14/18 - Bilateral L2, L3, L4 and L5 MBBs - No relief noted  3/20/19 - bilateral sacroiliac joint steroid injections    Relevant Surgeries: Previous right L4 hemilaminectomy    Affecting sleep? Yes    Affecting daily activities? yes    Depressive symptoms? no          SI/HI? No    Work status: Retired    Pain Scores:    Best:       3/10  Worst:     9/10  Usually:   5/10  Today:    3/10    Review of Systems   Constitutional:  Negative for activity change, appetite change, chills, fatigue, fever and unexpected weight change.   HENT:  Negative for hearing loss.    Eyes:  Negative for visual disturbance.   Respiratory:  Negative for chest tightness and shortness of breath.    Cardiovascular:  Negative for chest pain.   Gastrointestinal:  Negative for abdominal pain, constipation, diarrhea, nausea and vomiting.   Genitourinary:  Negative for difficulty urinating.   Musculoskeletal:  Positive for back pain, gait problem and myalgias. Negative for neck pain.   Skin:  Negative for rash.   Neurological:  Positive for weakness. Negative for dizziness, light-headedness, numbness and headaches.   Psychiatric/Behavioral:  Positive for sleep disturbance. Negative for hallucinations and suicidal ideas. The patient is not nervous/anxious.      Past Medical History:   Diagnosis Date    Chronic heart failure with preserved ejection fraction 2/9/2023    Chronic midline low back pain without sciatica 10/2/2017    Colon polyps     Coronary artery disease involving native coronary artery of native heart 3/5/2020    Coronary artery disease involving native coronary artery of native heart with angina pectoris 12/10/2020    Diabetes mellitus with neurological manifestations, uncontrolled 1/24/2017    Diabetic polyneuropathy associated with type 2 diabetes mellitus 1/24/2017    Diabetic polyneuropathy associated with type 2 diabetes mellitus     Essential  hypertension 1/24/2017    Gastroesophageal reflux disease 1/24/2017    Hyperlipidemia 1/24/2017    Insomnia 1/24/2017    Long-term insulin use 1/24/2017    Longstanding persistent atrial fibrillation 12/30/2020    Lumbar spondylosis 11/13/2017    Nuclear sclerosis of both eyes 8/24/2017    Obesity     PAD (peripheral artery disease) 3/23/2022    Stage 3 chronic kidney disease 2/13/2019    Uncontrolled type 2 diabetes mellitus without complication, with long-term current use of insulin 1/24/2017       Past Surgical History:   Procedure Laterality Date    ARTHROSCOPIC REPAIR OF ROTATOR CUFF OF SHOULDER Right 7/5/2022    Procedure: REPAIR, ROTATOR CUFF, ARTHROSCOPIC;  Surgeon: Valerie Olivera MD;  Location: [facility] OR;  Service: Orthopedics;  Laterality: Right;  HETAL LEMUS [phone]/ TEXTED ALLAN ON 6/23/2022 @ 9:47AM. ALLAN RESPONDED ON 6/23/2022 @ 10:33AM-LO  JEAN PAUL BABIN [phone] MY BE HERE FOR CASE SPOKE TO HIM @ 9:59AM ON 7-1-2022  RN PREOP 6/28/2022    BACK SURGERY      2002    CATARACT EXTRACTION W/  INTRAOCULAR LENS IMPLANT Right 08/29/2017    Dr. Hong    CATARACT EXTRACTION W/  INTRAOCULAR LENS IMPLANT Left 02/24/2022    Dr. Hong    COLONOSCOPY N/A 2/21/2017    Procedure: COLONOSCOPY;  Surgeon: Robb Rosado MD;  Location: [facility] ENDO;  Service: Endoscopy;  Laterality: N/A;    COLONOSCOPY N/A 7/5/2023    Procedure: COLONOSCOPY;  Surgeon: Aston Varela MD;  Location: [facility] ENDO;  Service: Endoscopy;  Laterality: N/A;  Referral: JOVANNI SCANLON  ok to hold Plavix 5 days and Eliquis 2 days per Dr Purdy-OFELIA   Meds  Suprep   Inst portal   LW    CORONARY ANGIOGRAPHY INCLUDING BYPASS GRAFTS WITH CATHETERIZATION OF LEFT HEART N/A 11/11/2020    Procedure: ANGIOGRAM, CORONARY, INCLUDING BYPASS GRAFT, WITH LEFT HEART CATHETERIZATION;  Surgeon: Lane Cuellar MD;  Location: [facility] CATH LAB;  Service: Cardiology;  Laterality: N/A;  RN PRE OP 11-5-2020. --COVID NEGATIVE ON   11-. C A    CORONARY ARTERY BYPASS GRAFT      March 2016    EPIDURAL STEROID INJECTION Bilateral 11/14/2018    Procedure: Lumbar Medial Branch Blocks;  Surgeon: Eze Byrd Jr., MD;  Location: [facility] ENDO;  Service: Pain Management;  Laterality: Bilateral;  Bilateral Lumbar Medial Branch Blocks L2, L3, L4, L5    [id number]  [id number]    Arrive @ 1150; NO Sedation    EPIDURAL STEROID INJECTION Bilateral 11/28/2018    Procedure: Injection, Steroid, Epidural;  Surgeon: Eze Byrd Jr., MD;  Location: [facility] ENDO;  Service: Pain Management;  Laterality: Bilateral;  Bilateral Sacroiliac Joint Steroid Injections    [id number]    Arrive @ 0910    EPIDURAL STEROID INJECTION Bilateral 3/20/2019    Procedure: Injection, Steroid, Sacroiliiac Joint;  Surgeon: Eze Byrd Jr., MD;  Location: [facility] ENDO;  Service: Pain Management;  Laterality: Bilateral;  Bilateral Sacroiliac Joint Steroid Injections    [id number]    Arrive @ 1145; Trigger point injections also?    INTRAOCULAR PROSTHESES INSERTION Left 2/24/2022    Procedure: INSERTION, IOL PROSTHESIS;  Surgeon: Nickolas Hong MD;  Location: [facility] OR;  Service: Ophthalmology;  Laterality: Left;    PHACOEMULSIFICATION OF CATARACT Left 2/24/2022    Procedure: PHACOEMULSIFICATION, CATARACT;  Surgeon: Nickolas Hong MD;  Location: [facility] OR;  Service: Ophthalmology;  Laterality: Left;  RN Phone Pre Op 2-16-22.  Covid NEGATIVE  2-23-22.  Arrival 08:00 am.    TONSILLECTOMY         Social History     Socioeconomic History    Marital status:    Tobacco Use    Smoking status: Never     Passive exposure: Never    Smokeless tobacco: Never   Substance and Sexual Activity    Alcohol use: Yes     Comment: rare occassion    Drug use: No    Sexual activity: Yes     Partners: Female     Social Determinants of Health     Financial Resource Strain: Low Risk     Difficulty of Paying Living Expenses: Not hard at all   Food Insecurity: No Food Insecurity    Worried About  "Running Out of Food in the Last Year: Never true    Ran Out of Food in the Last Year: Never true   Transportation Needs: No Transportation Needs    Lack of Transportation (Medical): No    Lack of Transportation (Non-Medical): No   Physical Activity: Insufficiently Active    Days of Exercise per Week: 3 days    Minutes of Exercise per Session: 30 min   Stress: No Stress Concern Present    Feeling of Stress : Only a little   Social Connections: Moderately Integrated    Frequency of Communication with Friends and Family: More than three times a week    Frequency of Social Gatherings with Friends and Family: Three times a week    Attends Mosque Services: Never    Active Member of Clubs or Organizations: Yes    Attends Club or Organization Meetings: More than 4 times per year    Marital Status:    Housing Stability: Low Risk     Unable to Pay for Housing in the Last Year: No    Number of Places Lived in the Last Year: 1    Unstable Housing in the Last Year: No       Review of patient's allergies indicates:   Allergen Reactions    Penicillins Other (See Comments)     Unknown reaction, Had a reaction as a child    Shrimp Itching     Hand itching       Current Outpatient Medications on File Prior to Visit   Medication Sig Dispense Refill    albuterol (PROVENTIL/VENTOLIN HFA) 90 mcg/actuation inhaler Inhale 2 puffs into the lungs every 4 (four) hours as needed for Shortness of Breath. Rescue 17 g 3    apixaban (ELIQUIS) 5 mg Tab Take 1 tablet (5 mg total) by mouth 2 (two) times daily. 180 tablet 3    ascorbic acid, vitamin C, (VITAMIN C) 100 MG tablet Take 100 mg by mouth daily as needed.      atorvastatin (LIPITOR) 80 MG tablet Take 1 tablet (80 mg total) by mouth every evening. 90 tablet 3    b complex vitamins tablet Take 1 tablet by mouth once daily.      BD ALCOHOL SWABS PadM       BD ULTRA-FINE ROLAND PEN NEEDLES 32 gauge x 5/32" Ndle       blood sugar diagnostic Strp Check blood glucose 2 times a day. True " metrix. E11.65 200 strip 3    blood-glucose meter kit Test glucose 2-3x/day. True metrix 1 each 0    celecoxib (CELEBREX) 100 MG capsule TAKE 1 CAPSULE(100 MG) BY MOUTH EVERY DAY 30 capsule 6    clopidogreL (PLAVIX) 75 mg tablet TAKE 1 TABLET(75 MG) BY MOUTH EVERY DAY 90 tablet 3    coenzyme Q10 100 mg capsule Take 100 mg by mouth once daily.      dapagliflozin (FARXIGA) 5 mg Tab tablet Take 1 tablet (5 mg total) by mouth once daily. 30 tablet 3    ergocalciferol (VITAMIN D2) 50,000 unit Cap TAKE ONE CAPSULE BY MOUTH WEEKLY 12 capsule 2    fenofibrate 160 MG Tab TAKE 1 TABLET EVERY MORNING 90 tablet 1    furosemide (LASIX) 20 MG tablet TAKE 1 TABLET TWICE DAILY 180 tablet 3    gabapentin (NEURONTIN) 100 MG capsule TAKE 2 CAPSULES EVERY MORNING AND TAKE 3 CAPSULES EVERY DAY WITH DINNER 450 capsule 12    glimepiride (AMARYL) 2 MG tablet TAKE 1 TABLET EVERY DAY BEFORE BREAKFAST 90 tablet 2    hydrALAZINE (APRESOLINE) 50 MG tablet Take 1 tablet (50 mg total) by mouth 2 (two) times a day. 180 tablet 3    HYDROcodone-acetaminophen (NORCO) 5-325 mg per tablet Take 1 tablet by mouth every 8 (eight) hours as needed for Pain. 15 tablet 0    insulin degludec (TRESIBA FLEXTOUCH U-100) 100 unit/mL (3 mL) insulin pen Inject 20 Units into the skin once daily. 30 mL 4    isosorbide mononitrate (IMDUR) 60 MG 24 hr tablet Take 1 tablet (60 mg total) by mouth once daily. 90 tablet 11    ketoconazole (NIZORAL) 2 % cream Apply topically once daily. 60 g 6    lancets Misc Check blood glucose 2x/day. True metrix. E11.65 200 each 3    magnesium 250 mg Tab Take 1 tablet by mouth once daily.       metFORMIN (GLUCOPHAGE-XR) 500 MG ER 24hr tablet Take 1 tablet with breakfast and 2 tablets with supper. 270 tablet 2    metoclopramide HCl (REGLAN) 10 MG tablet Take 1 tablet (10 mg total) by mouth every 6 (six) hours as needed (Nausea). 30 tablet 0    metoclopramide HCl (REGLAN) 10 MG tablet Take 1 tablet (10 mg total) by mouth every 6 (six) hours  as needed (Nausea). 30 tablet 0    nitroGLYCERIN (NITROSTAT) 0.4 MG SL tablet Place 1 tablet (0.4 mg total) under the tongue every 5 (five) minutes as needed for Chest pain. 25 tablet 11    omega-3 acid ethyl esters (LOVAZA) 1 gram capsule Take 2 capsules (2 g total) by mouth 2 (two) times daily. 360 capsule 3    ondansetron (ZOFRAN-ODT) 4 MG TbDL Dissolve 1 tablet (4 mg total) by mouth every 6 (six) hours as needed (nausea). 10 tablet 0    pantoprazole (PROTONIX) 40 MG tablet TAKE 1 TABLET EVERY DAY 90 tablet 3    pantoprazole (PROTONIX) 40 MG tablet Take 1 tablet (40 mg total) by mouth once daily. 30 tablet 0    semaglutide (OZEMPIC) 2 mg/dose (8 mg/3 mL) PnIj Inject 2 mg into the skin every 7 days. 4 each 3    semaglutide (OZEMPIC) 2 mg/dose (8 mg/3 mL) PnIj Inject 2 mg into the skin every 7 days. 1 each 0    tiZANidine (ZANAFLEX) 4 MG tablet Take 1 tablet (4 mg total) by mouth every 6 (six) hours as needed (pain). 360 tablet 3    triazolam (HALCION) 0.25 MG Tab TAKE 1 TABLET BY MOUTH EVERY NIGHT AT BEDTIME AS NEEDED 90 tablet 5    TRUEPLUS LANCETS 33 gauge [facility]        Current Facility-Administered Medications on File Prior to Visit   Medication Dose Route Frequency Provider Last Rate Last Admin    cyclopentolate 1% ophthalmic solution 1 drop  1 drop Left Eye On Call Procedure Nickolas Hong MD   1 drop at 02/24/22 0901    ofloxacin 0.3 % ophthalmic solution 1 drop  1 drop Left Eye On Call Procedure Nickolas Hong MD   2 drop at 02/24/22 1017    sodium chloride 0.9% flush 10 mL  10 mL Intravenous PRN Nickolas Hong MD        triamcinolone acetonide injection 40 mg  40 mg Intra-articular  Valerie Olivera MD   40 mg at 02/09/23 1030       Objective:      BP (!) 173/77 (BP Location: Left arm, Patient Position: Sitting, BP Method: Medium (Automatic))   Pulse (!) 59   Resp 18   SpO2 97%     Exam:  GEN:  Well developed, well nourished.  No acute distress.   HEENT:  No trauma.  Mucous  membranes moist.  Nares patent bilaterally.  PSYCH: Normal affect. Thought content appropriate.  CHEST:  Breathing symmetric.  No audible wheezing.  ABD: Soft, non-distended.  SKIN:  Warm, pink, dry.  No rash on exposed areas.    EXT:  No cyanosis, clubbing, or edema.  No color change or changes in nail or hair growth.  NEURO/MUSCULOSKELETAL:  Fully alert, oriented, and appropriate. Speech normal ivania. No cranial nerve deficits.   Gait: Normal normal.  No focal motor deficits.                 Imaging:    Narrative & Impression    EXAMINATION:  MRI LUMBAR SPINE WITHOUT CONTRAST     CLINICAL HISTORY:  Lumbar radiculopathy, symptoms persist with conservative treatment; Spondylosis without myelopathy or radiculopathy, lumbar region     TECHNIQUE:  Multiplanar, multisequence MR images were acquired from the thoracolumbar junction to the sacrum without the administration of contrast.     COMPARISON:  02/11/2019.     FINDINGS:  There is susceptibility weighted artifact within the midline lower lumbar spine, suggestive of prior instrumentation.     The lumbar alignment is within normal limits.  There are scattered Schmorl's nodes.  The vertebral body heights are maintained.  The bone marrow signal is within normal limits.     The conus terminates at the level of T12.  The cauda equina nerve roots are within normal limits.     There is multilevel disc desiccation.  Evaluation of the individual disc levels reveals the following:     L1-L2, there is a disc bulge along with facet hypertrophy and ligamentum flavum hypertrophy.  The spinal canal and neural foramina are unremarkable.     L2-L3, there is a disc bulge along with facet hypertrophy and ligamentum flavum hypertrophy.  The spinal canal and neural foramina are unremarkable There is small amount of fluid within the facet joints.     L3-L4, there is diffuse disc bulge along with facet hypertrophy and ligamentum flavum.  The spinal canal is within normal limits.  There  is mild spinal canal narrowing.     L4-L5, there is diffuse disc bulge along with facet hypertrophy and ligamentum flavum hypertrophy.  There is superimposed central disc protrusion.  There is marked narrowing of the lateral recesses.  There is mild to moderate narrowing the spinal canal.  There is moderate bilateral neural foraminal narrowing.     L5-S1, there is diffuse disc bulge along with facet hypertrophy and ligamentum flavum.  The spinal canal is within normal limits.  There is moderate neural foraminal narrowing.     The paraspinal soft tissues are unremarkable.     Impression:     Changes of prior instrumentation in the lower lumbar spine at the L4-L5 level.  Unchanged appearance of soft tissue at the prior disc site at the L4-L5 level.     Mild to moderate narrowing of the spinal canal at the L4-L5 level.     Mild-to-moderate neural foraminal narrowing the lower lumbar spine as above.        Electronically signed by: Kevin Erickson MD  Date:                                            07/18/2023  Time:                                           09:04       Narrative & Impression    EXAMINATION:  XR CERVICAL SPINE AP LAT WITH FLEX EXTEN     CLINICAL HISTORY:  Other cervical disc degeneration, unspecified cervical region     TECHNIQUE:  Three views of the cervical spine plus flexion and extension views were performed.     COMPARISON:  None     FINDINGS:  Cervical spine is normal in alignment, vertebral body heights are maintained.  No displaced fracture or subluxation.  No suspicious bone lesion.     No instability between the flexion and extension images.     Mild and moderate multilevel degenerative disc disease and uncovertebral DJD.  Anterior osteophytosis at levels C2-3, C4-5, and C5-6.  Mild multilevel facet DJD.     Unremarkable soft tissues.     Impression:     No acute abnormality.  No instability.     Mild and moderate multilevel DJD.        Electronically signed by: Marco Vásquez MD  Date:                                             08/18/2021  Time:                                           08:35           Narrative     EXAMINATION:  MRI LUMBAR SPINE WITHOUT CONTRAST    CLINICAL HISTORY:  lumbar ddd; Other intervertebral disc degeneration, lumbar region    TECHNIQUE:  Multiplanar, multisequence MR images were acquired from the thoracolumbar junction to the sacrum without the administration of contrast.    COMPARISON:  08/23/2017    FINDINGS:  There is no evidence of fracture or marrow replacement process.  There is disc desiccation at all lumbar levels with disc space narrowing at multiple levels but most significant at L4-5.  Sagittal alignment is maintained.  Conus terminates at T12-L1.  Visualized retroperitoneal structures demonstrate no significant abnormalities.    T12-L1, L1-L2: Mild diffuse disc bulge with no significant central canal stenosis or neural foraminal narrowing.    L2-L3: Mild diffuse disc bulge with moderate facet arthropathy.  No significant central canal stenosis or neural foraminal narrowing.    L3-4: Mild diffuse disc bulge extending into both neural foramen with moderate facet arthropathy causing moderate right and mild left neural foraminal narrowing.  No significant central canal stenosis.    L4-5: Hemilaminectomy defect on the right.  There is a diffuse disc bulge extending into both neural foramen with a superimposed central extrusion with an annular fissure extending slightly below the level of the disc plane.  This causes mass effect on the lateral recess and may impinge on the descending nerve roots although the canal is decompressed posteriorly by the laminectomy defect with no significant central canal stenosis.  There is severe facet arthropathy contributing to severe bilateral neural foraminal narrowing.    L5-S1: There is severe facet arthropathy.  There is a diffuse disc bulge with no significant central canal stenosis.  There is severe bilateral neural foraminal  narrowing..   Impression       Postoperative changes at L4 on the right with decompression of the central canal.  There is lumbar spondylosis most significant at L4-5 and L5-S1 where there is severe bilateral neural foraminal narrowing.  Specific details at each level are discussed above.      Electronically signed by: Quinton Goins MD  Date: 02/12/2019  Time: 10:10         Assessment:       Encounter Diagnoses   Name Primary?    DDD (degenerative disc disease), lumbar Yes    Lumbar radiculopathy     Lumbar spondylosis              Plan:       Driss was seen today for follow-up.    Diagnoses and all orders for this visit:    DDD (degenerative disc disease), lumbar  -     Case Request Endoscopy: Right L5 Transforaminal Epidural Steroid Injection    Lumbar radiculopathy  -     Case Request Endoscopy: Right L5 Transforaminal Epidural Steroid Injection    Lumbar spondylosis            Driss Hernandez . is a 72 y.o. male with worsening chronic bilateral low back pain.  Also with relatively new or symptoms concerning for right L3/L4 radicular pain.  No overt neurologic deficits noted on examination.  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  This is chronic.     Pertinent imaging studies reviewed by me. Imaging results were discussed with patient.  Schedule for right L5 TFESI.   Return to clinic after procedure to discuss results.

## 2023-07-25 ENCOUNTER — TELEPHONE (OUTPATIENT)
Dept: CARDIOLOGY | Facility: CLINIC | Age: 73
End: 2023-07-25
Payer: MEDICARE

## 2023-07-25 ENCOUNTER — TELEPHONE (OUTPATIENT)
Dept: PAIN MEDICINE | Facility: CLINIC | Age: 73
End: 2023-07-25
Payer: MEDICARE

## 2023-07-25 NOTE — TELEPHONE ENCOUNTER
----- Message from Tonia Archer RN sent at 7/25/2023  1:29 PM CDT -----  Regarding: Clearance to hold Anticoagulants  Dr. Purdy,     Mr. Hernandez is scheduled to have a right L5 TF ANNIE on 8/2/23.  Dr. Byrd is requesting that the patient stop taking Plavix five days and Eliquis three days prior to this procedure.  Please indicate whether or not he is able to stop taking the medications listed above.  Feel free to contact the clinic with any questions or concerns you may have.       Thank you in advance for your time and expertise,     CINDY Yu, RN

## 2023-07-25 NOTE — TELEPHONE ENCOUNTER
Reviewed pre-procedure instructions with Mrs. Hernandez (pt's wife), as well as provided arrival time of 8:30am for 8/2/23.  All questions answered- verbalized understanding.    Discussed that his last dose of Plavix will be 7/27/23 and last dose of Eliquis will be Saturday.  Clearance request #2 sent to Dr. Purdy's office today.  Will contact pt/wife should clearance not be received.

## 2023-08-01 RX ORDER — ALPRAZOLAM 0.5 MG/1
1 TABLET, ORALLY DISINTEGRATING ORAL ONCE AS NEEDED
Status: CANCELLED | OUTPATIENT
Start: 2023-08-01 | End: 2034-12-28

## 2023-08-01 NOTE — DISCHARGE INSTRUCTIONS

## 2023-08-02 ENCOUNTER — HOSPITAL ENCOUNTER (OUTPATIENT)
Facility: HOSPITAL | Age: 73
Discharge: HOME | End: 2023-08-02
Attending: PAIN MEDICINE | Admitting: PAIN MEDICINE
Payer: MEDICARE

## 2023-08-02 VITALS
TEMPERATURE: 98 F | DIASTOLIC BLOOD PRESSURE: 74 MMHG | RESPIRATION RATE: 18 BRPM | SYSTOLIC BLOOD PRESSURE: 162 MMHG | HEART RATE: 62 BPM | OXYGEN SATURATION: 98 %

## 2023-08-02 DIAGNOSIS — M54.16 LUMBAR RADICULOPATHY: Primary | ICD-10-CM

## 2023-08-02 LAB — POCT GLUCOSE: 126 MG/DL (ref 70–110)

## 2023-08-02 PROCEDURE — 82962 GLUCOSE BLOOD TEST: CPT | Performed by: PAIN MEDICINE

## 2023-08-02 PROCEDURE — 63600175 PHARM REV CODE 636 W HCPCS: Performed by: PAIN MEDICINE

## 2023-08-02 PROCEDURE — 25500020 PHARM REV CODE 255: Performed by: PAIN MEDICINE

## 2023-08-02 PROCEDURE — 25000003 PHARM REV CODE 250: Performed by: PAIN MEDICINE

## 2023-08-02 PROCEDURE — 64483 NJX AA&/STRD TFRM EPI L/S 1: CPT | Mod: RT | Performed by: PAIN MEDICINE

## 2023-08-02 PROCEDURE — 64483 NJX AA&/STRD TFRM EPI L/S 1: CPT | Mod: RT,,, | Performed by: PAIN MEDICINE

## 2023-08-02 PROCEDURE — 64483 PR EPIDURAL INJ, ANES/STEROID, TRANSFORAMINAL, LUMB/SACR, SNGL LEVL: ICD-10-PCS | Mod: RT,,, | Performed by: PAIN MEDICINE

## 2023-08-02 RX ORDER — LIDOCAINE HYDROCHLORIDE 10 MG/ML
INJECTION, SOLUTION EPIDURAL; INFILTRATION; INTRACAUDAL; PERINEURAL
Status: DISCONTINUED
Start: 2023-08-02 | End: 2023-08-02 | Stop reason: HOSPADM

## 2023-08-02 RX ORDER — LIDOCAINE HYDROCHLORIDE 10 MG/ML
10 INJECTION INFILTRATION; PERINEURAL ONCE
Status: COMPLETED | OUTPATIENT
Start: 2023-08-02 | End: 2023-08-02

## 2023-08-02 RX ORDER — LIDOCAINE HYDROCHLORIDE 10 MG/ML
INJECTION INFILTRATION; PERINEURAL
Status: DISCONTINUED
Start: 2023-08-02 | End: 2023-08-02 | Stop reason: HOSPADM

## 2023-08-02 RX ORDER — LIDOCAINE HYDROCHLORIDE 20 MG/ML
10 INJECTION, SOLUTION INFILTRATION; PERINEURAL ONCE
Status: DISCONTINUED | OUTPATIENT
Start: 2023-08-02 | End: 2023-08-02 | Stop reason: ALTCHOICE

## 2023-08-02 RX ORDER — DEXAMETHASONE SODIUM PHOSPHATE 10 MG/ML
10 INJECTION INTRAMUSCULAR; INTRAVENOUS ONCE
Status: COMPLETED | OUTPATIENT
Start: 2023-08-02 | End: 2023-08-02

## 2023-08-02 RX ORDER — DEXAMETHASONE SODIUM PHOSPHATE 10 MG/ML
INJECTION INTRAMUSCULAR; INTRAVENOUS
Status: DISCONTINUED
Start: 2023-08-02 | End: 2023-08-02 | Stop reason: HOSPADM

## 2023-08-02 RX ORDER — LIDOCAINE HYDROCHLORIDE 10 MG/ML
1 INJECTION, SOLUTION EPIDURAL; INFILTRATION; INTRACAUDAL; PERINEURAL ONCE
Status: COMPLETED | OUTPATIENT
Start: 2023-08-02 | End: 2023-08-02

## 2023-08-02 NOTE — DISCHARGE SUMMARY
[facility] [place] [place]  Short Stay    Procedure(s) (LRB):  Right L5 Transforaminal Epidural Steroid Injection (Right)      OUTCOME: Patient tolerated treatment/procedure well without complication and is now ready for discharge.    DISPOSITION: Home or Self Care    FINAL DIAGNOSIS:  <principal problem not specified>    FOLLOWUP: In clinic    DISCHARGE INSTRUCTIONS:  No discharge procedures on file.       Discharge Diagnosis:DDD (degenerative disc disease), lumbar [M51.36]  Lumbar radiculopathy [M54.16]  Condition on Discharge: Stable.  Diet on Discharge: Same as before.  Activity: as per instruction sheet.  Discharge to: Home with a responsible adult.  Follow up: as per Discharge instructions

## 2023-08-02 NOTE — OP NOTE
Lumbar transforaminal ANNIE    Time-out taken to identify patient and procedure side prior to starting the procedure.   I attest that I have reviewed the patient's home medications prior to the procedure and no contraindication have been identified. I  re-evaluated the patient after the patient was positioned for the procedure in the procedure room immediately before the procedural time-out. The vital signs are current and represent the current state of the patient which has not significantly changed since the preprocedure assessment.                              Date of Service: 08/02/2023    PCP: Jono Willoughby MD    Referring Physician:                                     PROCEDURE: Right L5 transforaminal epidural steroid injection under fluoroscopy    REASON FOR PROCEDURE: Right DDD (degenerative disc disease), lumbar [M51.36]  Lumbar radiculopathy [M54.16]    PHYSICIAN: Eze Byrd MD    ASSISTANTS:None    MEDICATIONS INJECTED:  Preservative-free dexamethasone 10mg, Xylocaine 1% MPF 3-5ml. 3ml per level. Preservative free, sterile normal saline is used to get larger volume as needed.    LOCAL ANESTHETIC INJECTED:  Xylocaine 1% 3ml per site.    SEDATION MEDICATIONS: None    ESTIMATED BLOOD LOSS:  None.    COMPLICATIONS:  None.    TECHNIQUE:   Laying in a prone position, the patient was prepped and draped in the usual sterile fashion using ChloraPrep and fenestrated drape.  The area to be injected was determined under fluoroscopic guidance.  Local anesthetic was given by raising a wheel and going down to the hub of a 27-gauge 1.25 inch needle.  The 4.75 inch 25-gauge spinal needle was introduced towards the transverse process of each above named nerve root level.  The needle was walked medially then hinged into the neural foramen.  Omnipaque was injected to confirm appropriate placement and that there was no vascular runoff.  The medication was then injected after applying negative pressure. The patient  tolerated the procedure well.    PAIN BEFORE THE PROCEDURE: 5/10.    PAIN AFTER THE PROCEDURE: 4/10.    The patient was monitored after the procedure.  Patient was given post procedure and discharge instructions to follow at home.  We will see the patient back in two weeks or the patient may call to inform of status. The patient was discharged in a stable condition.

## 2023-08-02 NOTE — PLAN OF CARE
Patient tolerated procedure well. Patient reports 3/10 pain after procedure. Assisted patient up for first time. Gait steady. All discharge instructions given.

## 2023-08-17 ENCOUNTER — OFFICE VISIT (OUTPATIENT)
Dept: PAIN MEDICINE | Facility: CLINIC | Age: 73
End: 2023-08-17
Payer: MEDICARE

## 2023-08-17 VITALS
SYSTOLIC BLOOD PRESSURE: 140 MMHG | HEART RATE: 52 BPM | RESPIRATION RATE: 20 BRPM | OXYGEN SATURATION: 100 % | DIASTOLIC BLOOD PRESSURE: 65 MMHG

## 2023-08-17 DIAGNOSIS — M54.16 LUMBAR RADICULOPATHY: ICD-10-CM

## 2023-08-17 DIAGNOSIS — M51.36 DDD (DEGENERATIVE DISC DISEASE), LUMBAR: Primary | ICD-10-CM

## 2023-08-17 DIAGNOSIS — M47.816 LUMBAR SPONDYLOSIS: ICD-10-CM

## 2023-08-17 DIAGNOSIS — M54.16 LUMBAR RADICULOPATHY, CHRONIC: ICD-10-CM

## 2023-08-17 PROCEDURE — 1101F PT FALLS ASSESS-DOCD LE1/YR: CPT | Mod: CPTII,S$GLB,,

## 2023-08-17 PROCEDURE — 99999 PR PBB SHADOW E&M-EST. PATIENT-LVL V: ICD-10-PCS | Mod: PBBFAC,,,

## 2023-08-17 PROCEDURE — 1159F PR MEDICATION LIST DOCUMENTED IN MEDICAL RECORD: ICD-10-PCS | Mod: CPTII,S$GLB,,

## 2023-08-17 PROCEDURE — 3288F PR FALLS RISK ASSESSMENT DOCUMENTED: ICD-10-PCS | Mod: CPTII,S$GLB,,

## 2023-08-17 PROCEDURE — 3078F DIAST BP <80 MM HG: CPT | Mod: CPTII,S$GLB,,

## 2023-08-17 PROCEDURE — 3044F PR MOST RECENT HEMOGLOBIN A1C LEVEL <7.0%: ICD-10-PCS | Mod: CPTII,S$GLB,,

## 2023-08-17 PROCEDURE — 3044F HG A1C LEVEL LT 7.0%: CPT | Mod: CPTII,S$GLB,,

## 2023-08-17 PROCEDURE — 1160F RVW MEDS BY RX/DR IN RCRD: CPT | Mod: CPTII,S$GLB,,

## 2023-08-17 PROCEDURE — 3066F NEPHROPATHY DOC TX: CPT | Mod: CPTII,S$GLB,,

## 2023-08-17 PROCEDURE — 3061F NEG MICROALBUMINURIA REV: CPT | Mod: CPTII,S$GLB,,

## 2023-08-17 PROCEDURE — 99214 OFFICE O/P EST MOD 30 MIN: CPT | Mod: S$GLB,,,

## 2023-08-17 PROCEDURE — 3077F SYST BP >= 140 MM HG: CPT | Mod: CPTII,S$GLB,,

## 2023-08-17 PROCEDURE — 3077F PR MOST RECENT SYSTOLIC BLOOD PRESSURE >= 140 MM HG: ICD-10-PCS | Mod: CPTII,S$GLB,,

## 2023-08-17 PROCEDURE — 1160F PR REVIEW ALL MEDS BY PRESCRIBER/CLIN PHARMACIST DOCUMENTED: ICD-10-PCS | Mod: CPTII,S$GLB,,

## 2023-08-17 PROCEDURE — 3066F PR DOCUMENTATION OF TREATMENT FOR NEPHROPATHY: ICD-10-PCS | Mod: CPTII,S$GLB,,

## 2023-08-17 PROCEDURE — 3061F PR NEG MICROALBUMINURIA RESULT DOCUMENTED/REVIEW: ICD-10-PCS | Mod: CPTII,S$GLB,,

## 2023-08-17 PROCEDURE — 1101F PR PT FALLS ASSESS DOC 0-1 FALLS W/OUT INJ PAST YR: ICD-10-PCS | Mod: CPTII,S$GLB,,

## 2023-08-17 PROCEDURE — 1159F MED LIST DOCD IN RCRD: CPT | Mod: CPTII,S$GLB,,

## 2023-08-17 PROCEDURE — 1125F AMNT PAIN NOTED PAIN PRSNT: CPT | Mod: CPTII,S$GLB,,

## 2023-08-17 PROCEDURE — 99214 PR OFFICE/OUTPT VISIT, EST, LEVL IV, 30-39 MIN: ICD-10-PCS | Mod: S$GLB,,,

## 2023-08-17 PROCEDURE — 3288F FALL RISK ASSESSMENT DOCD: CPT | Mod: CPTII,S$GLB,,

## 2023-08-17 PROCEDURE — 3078F PR MOST RECENT DIASTOLIC BLOOD PRESSURE < 80 MM HG: ICD-10-PCS | Mod: CPTII,S$GLB,,

## 2023-08-17 PROCEDURE — 99999 PR PBB SHADOW E&M-EST. PATIENT-LVL V: CPT | Mod: PBBFAC,,,

## 2023-08-17 PROCEDURE — 1125F PR PAIN SEVERITY QUANTIFIED, PAIN PRESENT: ICD-10-PCS | Mod: CPTII,S$GLB,,

## 2023-08-17 NOTE — PROGRESS NOTES
Subjective:     Patient ID: Driss Hernandez Jr. is a 72 y.o. male    Chief Complaint: Follow-up (8/2 S\P Right L5 TF ANNIE )      Referred by: No ref. provider found      HPI:    Interval History (7/18/23):  He returns today for follow up and MRI review.  He reports that his pain is unchanged in quality and location since last encounter.  He denies any new or worsening symptoms.      Interval History (7/14/23):  He returns today for follow up.  He reports that he has been having worsening low back and lower extremity pain.  States he continues to have bilateral low back pain as before though it has worsened since last encounter.  Also states that he is now having right-sided hip and thigh pain associated with his back pain.  These symptoms have been present for roughly 6 months.  This is typically present when sitting for prolonged periods of time.  States when he gets up he states that his thigh feels like it is being twisted.  The pain extends in a distribution consistent with the L3 dermatome.  Denies any associated paresthesias.  Does feel that the right lower extremity is weak when getting up after sitting though strength will return to normal after a few minutes.  Denies any associated bowel or bladder dysfunction.      Interval History NP (5/17/2022):  Mr Hernandez presents for delayed FU. Returning lower back pain where TPIs have done well in past and requesting repeat. He will be having shoulder surgery upcoming. Denies new areas of pain or neurological changes. No focal voicing of  myelopathic symptoms such as handwriting changes or difficulty with buttons/coins/keys. Denies perineal paresthesias, bowel/bladder dysfunction. Currently taking neurontin and zanaflex being taken minimally at this time.       Interval History (9/22/21):  He returns today for follow up.  He reports that TPIs done at last visit have been helpful. His low back is doing well and does not need any further attention at this time.  He does continue to have intermittent severe right shoulder pain. He denies any changes in the quality or location of this pain since last encounter. He denies any new or worsened symptoms.       Interval History (8/19/21):  He returns today for follow up.  He reports that physical therapy has been helpful for the right neck and shoulder pain.  He states that his neck pain was never very severe and has now essentially resolved.  He states that the vast majority of his pain is located right anterior shoulder.  The pain is exacerbated with certain movements and is particularly bothersome while sleeping.  He denies any associated numbness tingling with right shoulder pain.  Patient also states that he has recurrent low back pain.  Similar in quality and location to previous back pain that was well treated with trigger point injections.  He would like repeat trigger point injections done today      Interval History (6/4/21):  He returns today for follow up.  He reports that he has had right-sided neck and upper extremity pain for the past 2 weeks.  He states the pain started after handing his wife a heavy flower pot.  The pain is located the right lower cervical paraspinal region radiate to the right wrist with associated numbness and tingling in the fingers of the right hand.  He denies any focal weakness or bowel bladder dysfunction.  The pain is constant worse with activity.  The pain disrupts his sleep.       Interval History (1/6/20):  He returns today for follow up.  He reports that lumbar trigger point injections have been helpful for the bilateral low back pain. He reports greater than 85% relief for over 9 months.  He states the pain has returned.  He denies any changes in the quality or location was pain. He would like repeat trigger point injections today.      Interval History (3/18/19):  He returns today for follow up.  He reports that trigger point injections have been helpful for the right-sided low back  pain. He reports near complete resolution of the sharp right-sided low back pain. He does report that his previous low back pain starting to return.  He feels as though his current pain is exact same pain that was previously helped by sacroiliac joint injections.  He is interested in repeat injections if appropriate.      Interval History (2/12/19):  He returns today for follow up and imaging review.  He reports that his pain is unchanged since last encounter.  He denies any new or worsening neurologic symptoms.      Interval History (2/7/19):  He returns today for follow up.  He reports that he has been having acute right-sided low back pain for about 1 week.  He denies any specific inciting event or injury.  The pain is located in the lateral aspect of the right lumbosacral region.  It does not radiate.  He denies any associated numbness, tingling, weakness, bowel bladder dysfunction.  The pain varies in intensity from day-to-day.  The pain is much worse with coughing and sneezing and sitting with a forward flexed posture.  He is still able to go to the gym at times.      Interval History (12/17/18):  He returns today for follow up.  He reports that bilateral SIJ injections has been helpful for the low back pain. He reports about 80% relief. He does not feel that he needs any further interventions at this time      Interval History (11/19/18):  He returns today for follow up.  He reports that bilateral lumbar medial branch blocks were not helpful. Pain is unchanged in quality and location since last visit. He denies any new symptoms.       Interval History (10/16/18):  He returns today for follow up.  He reports that he has continued to have bilateral low back pain. He still has some right lower extremity pain, but this is not as bothersome as his low back pain. The pain is located across the lower lumbar region R>L. It does not radiate. It is not associated with any numbness, tingling, weakness or b/b  dysfunction. The pain is worse with activity. He also requests refills of tizanidine.      Interval History (12/18/17):  He returns today for follow up.  He reports that right L4 TFESI has been helpful for the right lumbar radicular pain. He reports 100% relief. He still has some lower back pain, but would prefer to discuss this later. Otherwise he is doing well.       Interval History (11/13/17):  He returns today for follow up.  He reports that PT has been helpful for the low back pain. He still has significant right foot and ankle pain when sitting in a reclined postion. This is the most painful area. He also has low back pain that is constant but does not bother him as much. Otherwise he is doing well.       Driss Hernandez Jr. is a 72 y.o. male who presents today with chronic bilateral low back pain R>>>L. Pain started over 40 years ago. No inciting injury or event noted. Pain is located mainly on the right side of the lower lumbar paraspinals. About 5 weeks ago, patient began to have right lwoer extremity pain that he felt was related to his back pain, but this has subsided. He denies any numbness, tingling, weakness, or b/b dysfunction. The pain is described as dull, but can become sharp at times. Patient states that his pain is worse in the morning. He does not sleep well. States that he wakes up every 45 minutes. Has taken Tizanidine PRN in the pat, but cannot recall how it affected him. Probably has not taken in over a year. Cannot take NSAIDs due to decreased renal function. Has taken in the past with mild relief.  This pain is described in detail below.    Physical Therapy:  Yes.    Non-pharmacologic Treatment: Nothing really helps         TENS? No    Pain Medications:         Currently taking: Gabapentin, Tizandine. Tylenol p.r.n., Celebrex    Has tried in the past:  NSAIDs    Has not tried: Opioids, Tylenol, TCAs, SNRIs, topical creams    Blood thinners:  Plavix, Eliquis    Interventional  Therapies:   11/29/17 - Right L4 TFESI - 100% relief  10/28/18 - bilateral SIJ injections -  80% relief  11/14/18 - Bilateral L2, L3, L4 and L5 MBBs - No relief noted  3/20/19 - bilateral sacroiliac joint steroid injections    Relevant Surgeries: Previous right L4 hemilaminectomy    Affecting sleep? Yes    Affecting daily activities? yes    Depressive symptoms? no          SI/HI? No    Work status: Retired    Pain Scores:    Best:       3/10  Worst:     9/10  Usually:   5/10  Today:    5/10    Review of Systems   Constitutional:  Negative for activity change, appetite change, chills, fatigue, fever and unexpected weight change.   HENT:  Negative for hearing loss.    Eyes:  Negative for visual disturbance.   Respiratory:  Negative for chest tightness and shortness of breath.    Cardiovascular:  Negative for chest pain.   Gastrointestinal:  Negative for abdominal pain, constipation, diarrhea, nausea and vomiting.   Genitourinary:  Negative for difficulty urinating.   Musculoskeletal:  Positive for back pain, gait problem and myalgias. Negative for neck pain.   Skin:  Negative for rash.   Neurological:  Positive for weakness. Negative for dizziness, light-headedness, numbness and headaches.   Psychiatric/Behavioral:  Positive for sleep disturbance. Negative for hallucinations and suicidal ideas. The patient is not nervous/anxious.        Past Medical History:   Diagnosis Date    Chronic heart failure with preserved ejection fraction 2/9/2023    Chronic midline low back pain without sciatica 10/2/2017    Colon polyps     Coronary artery disease involving native coronary artery of native heart 3/5/2020    Coronary artery disease involving native coronary artery of native heart with angina pectoris 12/10/2020    Diabetes mellitus with neurological manifestations, uncontrolled 1/24/2017    Diabetic polyneuropathy associated with type 2 diabetes mellitus 1/24/2017    Diabetic polyneuropathy associated with type 2 diabetes  mellitus     Essential hypertension 1/24/2017    Gastroesophageal reflux disease 1/24/2017    Hyperlipidemia 1/24/2017    Insomnia 1/24/2017    Long-term insulin use 1/24/2017    Longstanding persistent atrial fibrillation 12/30/2020    Lumbar spondylosis 11/13/2017    Nuclear sclerosis of both eyes 8/24/2017    Obesity     PAD (peripheral artery disease) 3/23/2022    Stage 3 chronic kidney disease 2/13/2019    Uncontrolled type 2 diabetes mellitus without complication, with long-term current use of insulin 1/24/2017       Past Surgical History:   Procedure Laterality Date    ARTHROSCOPIC REPAIR OF ROTATOR CUFF OF SHOULDER Right 7/5/2022    Procedure: REPAIR, ROTATOR CUFF, ARTHROSCOPIC;  Surgeon: Valerie Olivera MD;  Location: [facility] OR;  Service: Orthopedics;  Laterality: Right;  ANAYELI JANEE SAADIA LEMUS [phone]/ TEXTED ALLAN ON 6/23/2022 @ 9:47AM. ALLAN RESPONDED ON 6/23/2022 @ 10:33AM-LO  JEAN PAUL BABIN [phone] MY BE HERE FOR CASE SPOKE TO HIM @ 9:59AM ON 7-1-2022  RN PREOP 6/28/2022    BACK SURGERY      2002    CATARACT EXTRACTION W/  INTRAOCULAR LENS IMPLANT Right 08/29/2017    Dr. Hong    CATARACT EXTRACTION W/  INTRAOCULAR LENS IMPLANT Left 02/24/2022    Dr. Hong    COLONOSCOPY N/A 2/21/2017    Procedure: COLONOSCOPY;  Surgeon: Robb Rosado MD;  Location: [facility] ENDO;  Service: Endoscopy;  Laterality: N/A;    COLONOSCOPY N/A 7/5/2023    Procedure: COLONOSCOPY;  Surgeon: Aston Varela MD;  Location: [facility] ENDO;  Service: Endoscopy;  Laterality: N/A;  Referral: JOVANNI SCANLON  ok to hold Plavix 5 days and Eliquis 2 days per Dr Ford   Meds  Suprep   Inst portal   LW    CORONARY ANGIOGRAPHY INCLUDING BYPASS GRAFTS WITH CATHETERIZATION OF LEFT HEART N/A 11/11/2020    Procedure: ANGIOGRAM, CORONARY, INCLUDING BYPASS GRAFT, WITH LEFT HEART CATHETERIZATION;  Surgeon: Lane Cuellar MD;  Location: [facility] CATH LAB;  Service: Cardiology;  Laterality: N/A;  RN PRE OP 11-5-2020.  --COVID NEGATIVE ON  11-. C A    CORONARY ARTERY BYPASS GRAFT      March 2016    EPIDURAL STEROID INJECTION Bilateral 11/14/2018    Procedure: Lumbar Medial Branch Blocks;  Surgeon: Eze Byrd Jr., MD;  Location: [facility] ENDO;  Service: Pain Management;  Laterality: Bilateral;  Bilateral Lumbar Medial Branch Blocks L2, L3, L4, L5    87922  [id number]    Arrive @ 1150; NO Sedation    EPIDURAL STEROID INJECTION Bilateral 11/28/2018    Procedure: Injection, Steroid, Epidural;  Surgeon: Eze Byrd Jr., MD;  Location: [facility] ENDO;  Service: Pain Management;  Laterality: Bilateral;  Bilateral Sacroiliac Joint Steroid Injections    50022    Arrive @ 0910    EPIDURAL STEROID INJECTION Bilateral 3/20/2019    Procedure: Injection, Steroid, Sacroiliiac Joint;  Surgeon: Eze Byrd Jr., MD;  Location: [facility] ENDO;  Service: Pain Management;  Laterality: Bilateral;  Bilateral Sacroiliac Joint Steroid Injections    12541    Arrive @ 1145; Trigger point injections also?    EPIDURAL STEROID INJECTION Right 8/2/2023    Procedure: Right L5 Transforaminal Epidural Steroid Injection;  Surgeon: Eze Byrd Jr., MD;  Location: [facility] ENDO;  Service: Pain Management;  Laterality: Right;  @0830 (given)  Eliquis last 7/29  Plavix last 7/27  Check BG    INTRAOCULAR PROSTHESES INSERTION Left 2/24/2022    Procedure: INSERTION, IOL PROSTHESIS;  Surgeon: Nickolas Hong MD;  Location: [facility] OR;  Service: Ophthalmology;  Laterality: Left;    PHACOEMULSIFICATION OF CATARACT Left 2/24/2022    Procedure: PHACOEMULSIFICATION, CATARACT;  Surgeon: Nickolas Hong MD;  Location: [facility] OR;  Service: Ophthalmology;  Laterality: Left;  RN Phone Pre Op 2-16-22.  Covid NEGATIVE  2-23-22.  Arrival 08:00 am.    TONSILLECTOMY         Social History     Socioeconomic History    Marital status:    Tobacco Use    Smoking status: Never     Passive exposure: Never    Smokeless tobacco: Never   Substance and Sexual  Activity    Alcohol use: Yes     Comment: rare occassion    Drug use: No    Sexual activity: Yes     Partners: Female     Social Determinants of Health     Financial Resource Strain: Low Risk  (8/14/2023)    Overall Financial Resource Strain (CARDIA)     Difficulty of Paying Living Expenses: Not hard at all   Food Insecurity: No Food Insecurity (8/14/2023)    Hunger Vital Sign     Worried About Running Out of Food in the Last Year: Never true     Ran Out of Food in the Last Year: Never true   Transportation Needs: No Transportation Needs (8/14/2023)    PRAPARE - Transportation     Lack of Transportation (Medical): No     Lack of Transportation (Non-Medical): No   Physical Activity: Insufficiently Active (8/14/2023)    Exercise Vital Sign     Days of Exercise per Week: 3 days     Minutes of Exercise per Session: 40 min   Stress: No Stress Concern Present (8/14/2023)    [employer] - Occupational Stress Questionnaire     Feeling of Stress : Not at all   Recent Concern: Stress - Stress Concern Present (6/12/2023)    [employer] - Occupational Stress Questionnaire     Feeling of Stress : Rather much   Social Connections: Moderately Integrated (8/14/2023)    Social Connection and Isolation Panel [NHANES]     Frequency of Communication with Friends and Family: More than three times a week     Frequency of Social Gatherings with Friends and Family: Three times a week     Attends Zoroastrianism Services: Never     Active Member of Clubs or Organizations: Yes     Attends Club or Organization Meetings: More than 4 times per year     Marital Status:    Housing Stability: Low Risk  (8/14/2023)    Housing Stability Vital Sign     Unable to Pay for Housing in the Last Year: No     Number of Places Lived in the Last Year: 1     Unstable Housing in the Last Year: No       Review of patient's allergies indicates:   Allergen Reactions    Penicillins Other (See Comments)      "Unknown reaction, Had a reaction as a child    Shrimp Itching     Hand itching       Current Outpatient Medications on File Prior to Visit   Medication Sig Dispense Refill    albuterol (PROVENTIL/VENTOLIN HFA) 90 mcg/actuation inhaler Inhale 2 puffs into the lungs every 4 (four) hours as needed for Shortness of Breath. Rescue 17 g 3    apixaban (ELIQUIS) 5 mg Tab Take 1 tablet (5 mg total) by mouth 2 (two) times daily. 180 tablet 3    ascorbic acid, vitamin C, (VITAMIN C) 100 MG tablet Take 100 mg by mouth daily as needed.      atorvastatin (LIPITOR) 80 MG tablet Take 1 tablet (80 mg total) by mouth every evening. 90 tablet 3    b complex vitamins tablet Take 1 tablet by mouth once daily.      BD ALCOHOL SWABS PadM       BD ULTRA-FINE ROLAND PEN NEEDLES 32 gauge x 5/32" Ndle       blood sugar diagnostic Strp Check blood glucose 2 times a day. True metrix. E11.65 200 strip 3    blood-glucose meter kit Test glucose 2-3x/day. True metrix 1 each 0    celecoxib (CELEBREX) 100 MG capsule TAKE 1 CAPSULE(100 MG) BY MOUTH EVERY DAY 30 capsule 6    clopidogreL (PLAVIX) 75 mg tablet TAKE 1 TABLET(75 MG) BY MOUTH EVERY DAY 90 tablet 3    coenzyme Q10 100 mg capsule Take 100 mg by mouth once daily.      dapagliflozin (FARXIGA) 5 mg Tab tablet Take 1 tablet (5 mg total) by mouth once daily. 30 tablet 3    ergocalciferol (VITAMIN D2) 50,000 unit Cap TAKE ONE CAPSULE BY MOUTH WEEKLY 12 capsule 2    fenofibrate 160 MG Tab TAKE 1 TABLET EVERY MORNING 90 tablet 1    furosemide (LASIX) 20 MG tablet TAKE 1 TABLET TWICE DAILY 180 tablet 3    gabapentin (NEURONTIN) 100 MG capsule TAKE 2 CAPSULES EVERY MORNING AND TAKE 3 CAPSULES EVERY DAY WITH DINNER 450 capsule 12    glimepiride (AMARYL) 2 MG tablet TAKE 1 TABLET EVERY DAY BEFORE BREAKFAST 90 tablet 2    hydrALAZINE (APRESOLINE) 50 MG tablet Take 1 tablet (50 mg total) by mouth 2 (two) times a day. 180 tablet 3    HYDROcodone-acetaminophen (NORCO) 5-325 mg per tablet Take 1 tablet by mouth " every 8 (eight) hours as needed for Pain. 15 tablet 0    insulin degludec (TRESIBA FLEXTOUCH U-100) 100 unit/mL (3 mL) insulin pen Inject 20 Units into the skin once daily. 30 mL 4    isosorbide mononitrate (IMDUR) 60 MG 24 hr tablet Take 1 tablet (60 mg total) by mouth once daily. 90 tablet 11    ketoconazole (NIZORAL) 2 % cream Apply topically once daily. 60 g 6    lancets Misc Check blood glucose 2x/day. True metrix. E11.65 200 each 3    magnesium 250 mg Tab Take 1 tablet by mouth once daily.       metFORMIN (GLUCOPHAGE-XR) 500 MG ER 24hr tablet Take 1 tablet with breakfast and 2 tablets with supper. 270 tablet 2    metoclopramide HCl (REGLAN) 10 MG tablet Take 1 tablet (10 mg total) by mouth every 6 (six) hours as needed (Nausea). 30 tablet 0    metoclopramide HCl (REGLAN) 10 MG tablet Take 1 tablet (10 mg total) by mouth every 6 (six) hours as needed (Nausea). 30 tablet 0    nitroGLYCERIN (NITROSTAT) 0.4 MG SL tablet Place 1 tablet (0.4 mg total) under the tongue every 5 (five) minutes as needed for Chest pain. 25 tablet 11    omega-3 acid ethyl esters (LOVAZA) 1 gram capsule Take 2 capsules (2 g total) by mouth 2 (two) times daily. 360 capsule 3    ondansetron (ZOFRAN-ODT) 4 MG TbDL Dissolve 1 tablet (4 mg total) by mouth every 6 (six) hours as needed (nausea). 10 tablet 0    pantoprazole (PROTONIX) 40 MG tablet TAKE 1 TABLET EVERY DAY 90 tablet 3    pantoprazole (PROTONIX) 40 MG tablet Take 1 tablet (40 mg total) by mouth once daily. 30 tablet 0    semaglutide (OZEMPIC) 2 mg/dose (8 mg/3 mL) PnIj Inject 2 mg into the skin every 7 days. 4 each 3    semaglutide (OZEMPIC) 2 mg/dose (8 mg/3 mL) PnIj Inject 2 mg into the skin every 7 days. 1 each 0    tiZANidine (ZANAFLEX) 4 MG tablet Take 1 tablet (4 mg total) by mouth every 6 (six) hours as needed (pain). 360 tablet 3    triazolam (HALCION) 0.25 MG Tab TAKE 1 TABLET BY MOUTH EVERY NIGHT AT BEDTIME AS NEEDED 90 tablet 5    TRUEPLUS LANCETS 33 gauge Misc         Current Facility-Administered Medications on File Prior to Visit   Medication Dose Route Frequency Provider Last Rate Last Admin    cyclopentolate 1% ophthalmic solution 1 drop  1 drop Left Eye On Call Procedure Nickolas Hong MD   1 drop at 02/24/22 0901    ofloxacin 0.3 % ophthalmic solution 1 drop  1 drop Left Eye On Call Procedure Nickolas Hong MD   2 drop at 02/24/22 1017    sodium chloride 0.9% flush 10 mL  10 mL Intravenous PRN Nickolas Hong MD        triamcinolone acetonide injection 40 mg  40 mg Intra-articular  Valerie Olivera MD   40 mg at 02/09/23 1030       Objective:      BP (!) 140/65 (BP Location: Right arm, Patient Position: Sitting, BP Method: Medium (Automatic))   Pulse (!) 52   Resp 20   SpO2 100%     Exam:  GEN:  Well developed, well nourished.  No acute distress.   HEENT:  No trauma.  Mucous membranes moist.  Nares patent bilaterally.  PSYCH: Normal affect. Thought content appropriate.  CHEST:  Breathing symmetric.  No audible wheezing.  ABD: Soft, non-distended.  SKIN:  Warm, pink, dry.  No rash on exposed areas.    EXT:  No cyanosis, clubbing, or edema.  No color change or changes in nail or hair growth.  NEURO/MUSCULOSKELETAL:  Fully alert, oriented, and appropriate. Speech normal ivania. No cranial nerve deficits.   Gait: Normal normal.  No focal motor deficits.                 Imaging:    Narrative & Impression    EXAMINATION:  MRI LUMBAR SPINE WITHOUT CONTRAST     CLINICAL HISTORY:  Lumbar radiculopathy, symptoms persist with conservative treatment; Spondylosis without myelopathy or radiculopathy, lumbar region     TECHNIQUE:  Multiplanar, multisequence MR images were acquired from the thoracolumbar junction to the sacrum without the administration of contrast.     COMPARISON:  02/11/2019.     FINDINGS:  There is susceptibility weighted artifact within the midline lower lumbar spine, suggestive of prior instrumentation.     The lumbar alignment is  within normal limits.  There are scattered Schmorl's nodes.  The vertebral body heights are maintained.  The bone marrow signal is within normal limits.     The conus terminates at the level of T12.  The cauda equina nerve roots are within normal limits.     There is multilevel disc desiccation.  Evaluation of the individual disc levels reveals the following:     L1-L2, there is a disc bulge along with facet hypertrophy and ligamentum flavum hypertrophy.  The spinal canal and neural foramina are unremarkable.     L2-L3, there is a disc bulge along with facet hypertrophy and ligamentum flavum hypertrophy.  The spinal canal and neural foramina are unremarkable There is small amount of fluid within the facet joints.     L3-L4, there is diffuse disc bulge along with facet hypertrophy and ligamentum flavum.  The spinal canal is within normal limits.  There is mild spinal canal narrowing.     L4-L5, there is diffuse disc bulge along with facet hypertrophy and ligamentum flavum hypertrophy.  There is superimposed central disc protrusion.  There is marked narrowing of the lateral recesses.  There is mild to moderate narrowing the spinal canal.  There is moderate bilateral neural foraminal narrowing.     L5-S1, there is diffuse disc bulge along with facet hypertrophy and ligamentum flavum.  The spinal canal is within normal limits.  There is moderate neural foraminal narrowing.     The paraspinal soft tissues are unremarkable.     Impression:     Changes of prior instrumentation in the lower lumbar spine at the L4-L5 level.  Unchanged appearance of soft tissue at the prior disc site at the L4-L5 level.     Mild to moderate narrowing of the spinal canal at the L4-L5 level.     Mild-to-moderate neural foraminal narrowing the lower lumbar spine as above.        Electronically signed by: Kevin Erickson MD  Date:                                            07/18/2023  Time:                                           09:04        Narrative & Impression    EXAMINATION:  XR CERVICAL SPINE AP LAT WITH FLEX EXTEN     CLINICAL HISTORY:  Other cervical disc degeneration, unspecified cervical region     TECHNIQUE:  Three views of the cervical spine plus flexion and extension views were performed.     COMPARISON:  None     FINDINGS:  Cervical spine is normal in alignment, vertebral body heights are maintained.  No displaced fracture or subluxation.  No suspicious bone lesion.     No instability between the flexion and extension images.     Mild and moderate multilevel degenerative disc disease and uncovertebral DJD.  Anterior osteophytosis at levels C2-3, C4-5, and C5-6.  Mild multilevel facet DJD.     Unremarkable soft tissues.     Impression:     No acute abnormality.  No instability.     Mild and moderate multilevel DJD.        Electronically signed by: Marco Vásquez MD  Date:                                            08/18/2021  Time:                                           08:35           Narrative     EXAMINATION:  MRI LUMBAR SPINE WITHOUT CONTRAST    CLINICAL HISTORY:  lumbar ddd; Other intervertebral disc degeneration, lumbar region    TECHNIQUE:  Multiplanar, multisequence MR images were acquired from the thoracolumbar junction to the sacrum without the administration of contrast.    COMPARISON:  08/23/2017    FINDINGS:  There is no evidence of fracture or marrow replacement process.  There is disc desiccation at all lumbar levels with disc space narrowing at multiple levels but most significant at L4-5.  Sagittal alignment is maintained.  Conus terminates at T12-L1.  Visualized retroperitoneal structures demonstrate no significant abnormalities.    T12-L1, L1-L2: Mild diffuse disc bulge with no significant central canal stenosis or neural foraminal narrowing.    L2-L3: Mild diffuse disc bulge with moderate facet arthropathy.  No significant central canal stenosis or neural foraminal narrowing.    L3-4: Mild diffuse disc bulge  extending into both neural foramen with moderate facet arthropathy causing moderate right and mild left neural foraminal narrowing.  No significant central canal stenosis.    L4-5: Hemilaminectomy defect on the right.  There is a diffuse disc bulge extending into both neural foramen with a superimposed central extrusion with an annular fissure extending slightly below the level of the disc plane.  This causes mass effect on the lateral recess and may impinge on the descending nerve roots although the canal is decompressed posteriorly by the laminectomy defect with no significant central canal stenosis.  There is severe facet arthropathy contributing to severe bilateral neural foraminal narrowing.    L5-S1: There is severe facet arthropathy.  There is a diffuse disc bulge with no significant central canal stenosis.  There is severe bilateral neural foraminal narrowing..   Impression       Postoperative changes at L4 on the right with decompression of the central canal.  There is lumbar spondylosis most significant at L4-5 and L5-S1 where there is severe bilateral neural foraminal narrowing.  Specific details at each level are discussed above.      Electronically signed by: Quinton Goins MD  Date: 02/12/2019  Time: 10:10         Assessment:       No diagnosis found.            Plan:       There are no diagnoses linked to this encounter.          Driss Hernandez . is a 72 y.o. male with worsening chronic bilateral low back pain.  Also with relatively new or symptoms concerning for right L3/L4 radicular pain.  No overt neurologic deficits noted on examination.  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  This is chronic.     Pertinent imaging studies reviewed by me. Imaging results were discussed with patient.  Schedule for right L5 TFESI.   Return to clinic after procedure to discuss results.

## 2023-08-17 NOTE — PROGRESS NOTES
Subjective:     Patient ID: Driss Hernandez Jr. is a 72 y.o. male    Chief Complaint: Follow-up (8/2 S\P Right L5 TF ANNIE )      Referred by: No ref. provider found      HPI:    Interval History PA (08/17/2023):  Patient returns to clinic for follow up.  Patient is s/p right L5 transforaminal epidural steroid injection done on 08/02/2023 reporting some initial relief with the 1st few days following the procedure.  Overall continued 30% relief.  Denies any changes in the quality or location of his pain.  States that he has been having minimal back pain, only notices a pressure sensation in his lower lumbar spine.  The majority of his pain is located between his right lateral hip into his anterior thigh stopping at the knee.  States his pain is intermittent, typically only present when getting out of bed, or standing up from a chair.  Notes minimal to no pain with activity or rest.  States symptoms still bothersome but overall tolerable at this time.  Denies any numbness, tingling, weakness, bowel or bladder dysfunction.    Interval History (7/18/23):  He returns today for follow up and MRI review.  He reports that his pain is unchanged in quality and location since last encounter.  He denies any new or worsening symptoms.      Interval History (7/14/23):  He returns today for follow up.  He reports that he has been having worsening low back and lower extremity pain.  States he continues to have bilateral low back pain as before though it has worsened since last encounter.  Also states that he is now having right-sided hip and thigh pain associated with his back pain.  These symptoms have been present for roughly 6 months.  This is typically present when sitting for prolonged periods of time.  States when he gets up he states that his thigh feels like it is being twisted.  The pain extends in a distribution consistent with the L3 dermatome.  Denies any associated paresthesias.  Does feel that the right lower  extremity is weak when getting up after sitting though strength will return to normal after a few minutes.  Denies any associated bowel or bladder dysfunction.      Interval History NP (5/17/2022):  Mr Hernandez presents for delayed FU. Returning lower back pain where TPIs have done well in past and requesting repeat. He will be having shoulder surgery upcoming. Denies new areas of pain or neurological changes. No focal voicing of  myelopathic symptoms such as handwriting changes or difficulty with buttons/coins/keys. Denies perineal paresthesias, bowel/bladder dysfunction. Currently taking neurontin and zanaflex being taken minimally at this time.       Interval History (9/22/21):  He returns today for follow up.  He reports that TPIs done at last visit have been helpful. His low back is doing well and does not need any further attention at this time. He does continue to have intermittent severe right shoulder pain. He denies any changes in the quality or location of this pain since last encounter. He denies any new or worsened symptoms.       Interval History (8/19/21):  He returns today for follow up.  He reports that physical therapy has been helpful for the right neck and shoulder pain.  He states that his neck pain was never very severe and has now essentially resolved.  He states that the vast majority of his pain is located right anterior shoulder.  The pain is exacerbated with certain movements and is particularly bothersome while sleeping.  He denies any associated numbness tingling with right shoulder pain.  Patient also states that he has recurrent low back pain.  Similar in quality and location to previous back pain that was well treated with trigger point injections.  He would like repeat trigger point injections done today      Interval History (6/4/21):  He returns today for follow up.  He reports that he has had right-sided neck and upper extremity pain for the past 2 weeks.  He states the pain  started after handing his wife a heavy flower pot.  The pain is located the right lower cervical paraspinal region radiate to the right wrist with associated numbness and tingling in the fingers of the right hand.  He denies any focal weakness or bowel bladder dysfunction.  The pain is constant worse with activity.  The pain disrupts his sleep.       Interval History (1/6/20):  He returns today for follow up.  He reports that lumbar trigger point injections have been helpful for the bilateral low back pain. He reports greater than 85% relief for over 9 months.  He states the pain has returned.  He denies any changes in the quality or location was pain. He would like repeat trigger point injections today.      Interval History (3/18/19):  He returns today for follow up.  He reports that trigger point injections have been helpful for the right-sided low back pain. He reports near complete resolution of the sharp right-sided low back pain. He does report that his previous low back pain starting to return.  He feels as though his current pain is exact same pain that was previously helped by sacroiliac joint injections.  He is interested in repeat injections if appropriate.      Interval History (2/12/19):  He returns today for follow up and imaging review.  He reports that his pain is unchanged since last encounter.  He denies any new or worsening neurologic symptoms.      Interval History (2/7/19):  He returns today for follow up.  He reports that he has been having acute right-sided low back pain for about 1 week.  He denies any specific inciting event or injury.  The pain is located in the lateral aspect of the right lumbosacral region.  It does not radiate.  He denies any associated numbness, tingling, weakness, bowel bladder dysfunction.  The pain varies in intensity from day-to-day.  The pain is much worse with coughing and sneezing and sitting with a forward flexed posture.  He is still able to go to the gym at  times.      Interval History (12/17/18):  He returns today for follow up.  He reports that bilateral SIJ injections has been helpful for the low back pain. He reports about 80% relief. He does not feel that he needs any further interventions at this time      Interval History (11/19/18):  He returns today for follow up.  He reports that bilateral lumbar medial branch blocks were not helpful. Pain is unchanged in quality and location since last visit. He denies any new symptoms.       Interval History (10/16/18):  He returns today for follow up.  He reports that he has continued to have bilateral low back pain. He still has some right lower extremity pain, but this is not as bothersome as his low back pain. The pain is located across the lower lumbar region R>L. It does not radiate. It is not associated with any numbness, tingling, weakness or b/b dysfunction. The pain is worse with activity. He also requests refills of tizanidine.      Interval History (12/18/17):  He returns today for follow up.  He reports that right L4 TFESI has been helpful for the right lumbar radicular pain. He reports 100% relief. He still has some lower back pain, but would prefer to discuss this later. Otherwise he is doing well.       Interval History (11/13/17):  He returns today for follow up.  He reports that PT has been helpful for the low back pain. He still has significant right foot and ankle pain when sitting in a reclined postion. This is the most painful area. He also has low back pain that is constant but does not bother him as much. Otherwise he is doing well.       Driss Hernandez Jr. is a 72 y.o. male who presents today with chronic bilateral low back pain R>>>L. Pain started over 40 years ago. No inciting injury or event noted. Pain is located mainly on the right side of the lower lumbar paraspinals. About 5 weeks ago, patient began to have right lwoer extremity pain that he felt was related to his back pain, but this  has subsided. He denies any numbness, tingling, weakness, or b/b dysfunction. The pain is described as dull, but can become sharp at times. Patient states that his pain is worse in the morning. He does not sleep well. States that he wakes up every 45 minutes. Has taken Tizanidine PRN in the pat, but cannot recall how it affected him. Probably has not taken in over a year. Cannot take NSAIDs due to decreased renal function. Has taken in the past with mild relief.  This pain is described in detail below.    Physical Therapy:  Yes.    Non-pharmacologic Treatment: Nothing really helps         TENS? No    Pain Medications:         Currently taking: Gabapentin, Tizandine. Tylenol p.r.n., Celebrex    Has tried in the past:  NSAIDs    Has not tried: Opioids, Tylenol, TCAs, SNRIs, topical creams    Blood thinners:  Plavix, Eliquis    Interventional Therapies:   11/29/17 - Right L4 TFESI - 100% relief  10/28/18 - bilateral SIJ injections -  80% relief  11/14/18 - Bilateral L2, L3, L4 and L5 MBBs - No relief noted  3/20/19 - bilateral sacroiliac joint steroid injections  08/02/2023 - right L5 transforaminal epidural steroid injection - 30% relief    Relevant Surgeries: Previous right L4 hemilaminectomy    Affecting sleep? Yes    Affecting daily activities? yes    Depressive symptoms? no          SI/HI? No    Work status: Retired    Pain Scores:    Best:       5/10  Worst:     8/10  Usually:   5/10  Today:    5/10     (     Review of Systems   Constitutional:  Negative for activity change, appetite change, chills, fatigue, fever and unexpected weight change.   HENT:  Negative for hearing loss.    Eyes:  Negative for visual disturbance.   Respiratory:  Negative for chest tightness and shortness of breath.    Cardiovascular:  Negative for chest pain.   Gastrointestinal:  Negative for abdominal pain, constipation, diarrhea, nausea and vomiting.   Genitourinary:  Negative for difficulty urinating.   Musculoskeletal:  Positive for  back pain, gait problem and myalgias. Negative for neck pain.   Skin:  Negative for rash.   Neurological:  Positive for weakness. Negative for dizziness, light-headedness, numbness and headaches.   Psychiatric/Behavioral:  Positive for sleep disturbance. Negative for hallucinations and suicidal ideas. The patient is not nervous/anxious.        Past Medical History:   Diagnosis Date    Chronic heart failure with preserved ejection fraction 2/9/2023    Chronic midline low back pain without sciatica 10/2/2017    Colon polyps     Coronary artery disease involving native coronary artery of native heart 3/5/2020    Coronary artery disease involving native coronary artery of native heart with angina pectoris 12/10/2020    Diabetes mellitus with neurological manifestations, uncontrolled 1/24/2017    Diabetic polyneuropathy associated with type 2 diabetes mellitus 1/24/2017    Diabetic polyneuropathy associated with type 2 diabetes mellitus     Essential hypertension 1/24/2017    Gastroesophageal reflux disease 1/24/2017    Hyperlipidemia 1/24/2017    Insomnia 1/24/2017    Long-term insulin use 1/24/2017    Longstanding persistent atrial fibrillation 12/30/2020    Lumbar spondylosis 11/13/2017    Nuclear sclerosis of both eyes 8/24/2017    Obesity     PAD (peripheral artery disease) 3/23/2022    Stage 3 chronic kidney disease 2/13/2019    Uncontrolled type 2 diabetes mellitus without complication, with long-term current use of insulin 1/24/2017       Past Surgical History:   Procedure Laterality Date    ARTHROSCOPIC REPAIR OF ROTATOR CUFF OF SHOULDER Right 7/5/2022    Procedure: REPAIR, ROTATOR CUFF, ARTHROSCOPIC;  Surgeon: Valerie Olivera MD;  Location: [facility];  Service: Orthopedics;  Laterality: Right;  HETAL LEMUS [phone]/ TEXTED ALLAN ON 6/23/2022 @ 9:47AM. ALLAN RESPONDED ON 6/23/2022 @ 10:33AM-  JEAN PAUL BABIN [phone] MY BE HERE FOR CASE SPOKE TO HIM @ 9:59AM ON 7-1-2022  RN PREOP 6/28/2022     BACK SURGERY      2002    CATARACT EXTRACTION W/  INTRAOCULAR LENS IMPLANT Right 08/29/2017    Dr. Hong    CATARACT EXTRACTION W/  INTRAOCULAR LENS IMPLANT Left 02/24/2022    Dr. Hong    COLONOSCOPY N/A 2/21/2017    Procedure: COLONOSCOPY;  Surgeon: Robb Rosado MD;  Location: [facility] ENDO;  Service: Endoscopy;  Laterality: N/A;    COLONOSCOPY N/A 7/5/2023    Procedure: COLONOSCOPY;  Surgeon: Aston Varela MD;  Location: [facility] ENDO;  Service: Endoscopy;  Laterality: N/A;  Referral: JOVANNI SCANLON  ok to hold Plavix 5 days and Eliquis 2 days per Dr Purdy-GT  [facility]  Suprep   Inst portal   LW    CORONARY ANGIOGRAPHY INCLUDING BYPASS GRAFTS WITH CATHETERIZATION OF LEFT HEART N/A 11/11/2020    Procedure: ANGIOGRAM, CORONARY, INCLUDING BYPASS GRAFT, WITH LEFT HEART CATHETERIZATION;  Surgeon: Lane Cuellar MD;  Location: [facility] CATH LAB;  Service: Cardiology;  Laterality: N/A;  RN PRE OP 11-5-2020. --COVID NEGATIVE ON  11-. C A    CORONARY ARTERY BYPASS GRAFT      March 2016    EPIDURAL STEROID INJECTION Bilateral 11/14/2018    Procedure: Lumbar Medial Branch Blocks;  Surgeon: Eze Byrd Jr., MD;  Location: [facility] ENDO;  Service: Pain Management;  Laterality: Bilateral;  Bilateral Lumbar Medial Branch Blocks L2, L3, L4, L5    [id number]  [id number]    Arrive @ 1150; NO Sedation    EPIDURAL STEROID INJECTION Bilateral 11/28/2018    Procedure: Injection, Steroid, Epidural;  Surgeon: Eze Byrd Jr., MD;  Location: [facility];  Service: Pain Management;  Laterality: Bilateral;  Bilateral Sacroiliac Joint Steroid Injections    [id number]    Arrive @ 0910    EPIDURAL STEROID INJECTION Bilateral 3/20/2019    Procedure: Injection, Steroid, Sacroiliiac Joint;  Surgeon: Eze Byrd Jr., MD;  Location: [facility] ENDO;  Service: Pain Management;  Laterality: Bilateral;  Bilateral Sacroiliac Joint Steroid Injections    [id number]    Arrive @ 1145; Trigger point injections also?    EPIDURAL STEROID  INJECTION Right 8/2/2023    Procedure: Right L5 Transforaminal Epidural Steroid Injection;  Surgeon: Eze Byrd Jr., MD;  Location: [facility] ENDO;  Service: Pain Management;  Laterality: Right;  @0830 (given)  Eliquis last 7/29  Plavix last 7/27  Check BG    INTRAOCULAR PROSTHESES INSERTION Left 2/24/2022    Procedure: INSERTION, IOL PROSTHESIS;  Surgeon: Nickolas Hong MD;  Location: [facility] OR;  Service: Ophthalmology;  Laterality: Left;    PHACOEMULSIFICATION OF CATARACT Left 2/24/2022    Procedure: PHACOEMULSIFICATION, CATARACT;  Surgeon: Nickolas Hong MD;  Location: [facility] OR;  Service: Ophthalmology;  Laterality: Left;  RN Phone Pre Op 2-16-22.  Covid NEGATIVE  2-23-22.  Arrival 08:00 am.    TONSILLECTOMY         Social History     Socioeconomic History    Marital status:    Tobacco Use    Smoking status: Never     Passive exposure: Never    Smokeless tobacco: Never   Substance and Sexual Activity    Alcohol use: Yes     Comment: rare occassion    Drug use: No    Sexual activity: Yes     Partners: Female     Social Determinants of Health     Financial Resource Strain: Low Risk  (8/14/2023)    Overall Financial Resource Strain (CARDIA)     Difficulty of Paying Living Expenses: Not hard at all   Food Insecurity: No Food Insecurity (8/14/2023)    Hunger Vital Sign     Worried About Running Out of Food in the Last Year: Never true     Ran Out of Food in the Last Year: Never true   Transportation Needs: No Transportation Needs (8/14/2023)    PRAPARE - Transportation     Lack of Transportation (Medical): No     Lack of Transportation (Non-Medical): No   Physical Activity: Insufficiently Active (8/14/2023)    Exercise Vital Sign     Days of Exercise per Week: 3 days     Minutes of Exercise per Session: 40 min   Stress: No Stress Concern Present (8/14/2023)    [place] [employer] - Occupational Stress Questionnaire     Feeling of Stress : Not at all   Recent Concern:  "Stress - Stress Concern Present (6/12/2023)    [place] citizen [place] of Occupational [employer] - Occupational Stress Questionnaire     Feeling of Stress : Rather much   Social Connections: Moderately Integrated (8/14/2023)    Social Connection and Isolation Panel [NHANES]     Frequency of Communication with Friends and Family: More than three times a week     Frequency of Social Gatherings with Friends and Family: Three times a week     Attends [employer]ology Services: Never     Active Member of Clubs or Organizations: Yes     Attends Club or Organization Meetings: More than 4 times per year     Marital Status:    Housing Stability: Low Risk  (8/14/2023)    Housing Stability Vital Sign     Unable to Pay for Housing in the Last Year: No     Number of Places Lived in the Last Year: 1     Unstable Housing in the Last Year: No       Review of patient's allergies indicates:   Allergen Reactions    Penicillins Other (See Comments)     Unknown reaction, Had a reaction as a child    Shrimp Itching     Hand itching       Current Outpatient Medications on File Prior to Visit   Medication Sig Dispense Refill    albuterol (PROVENTIL/VENTOLIN HFA) 90 mcg/actuation inhaler Inhale 2 puffs into the lungs every 4 (four) hours as needed for Shortness of Breath. Rescue 17 g 3    apixaban (ELIQUIS) 5 mg Tab Take 1 tablet (5 mg total) by mouth 2 (two) times daily. 180 tablet 3    ascorbic acid, vitamin C, (VITAMIN C) 100 MG tablet Take 100 mg by mouth daily as needed.      atorvastatin (LIPITOR) 80 MG tablet Take 1 tablet (80 mg total) by mouth every evening. 90 tablet 3    b complex vitamins tablet Take 1 tablet by mouth once daily.      BD ALCOHOL SWABS PadM       BD ULTRA-FINE ROLAND PEN NEEDLES 32 gauge x 5/32" Ndle       blood sugar diagnostic Strp Check blood glucose 2 times a day. True metrix. E11.65 200 strip 3    blood-glucose meter kit Test glucose 2-3x/day. True metrix 1 each 0    celecoxib (CELEBREX) 100 MG capsule TAKE 1 " CAPSULE(100 MG) BY MOUTH EVERY DAY 30 capsule 6    clopidogreL (PLAVIX) 75 mg tablet TAKE 1 TABLET(75 MG) BY MOUTH EVERY DAY 90 tablet 3    coenzyme Q10 100 mg capsule Take 100 mg by mouth once daily.      dapagliflozin (FARXIGA) 5 mg Tab tablet Take 1 tablet (5 mg total) by mouth once daily. 30 tablet 3    ergocalciferol (VITAMIN D2) 50,000 unit Cap TAKE ONE CAPSULE BY MOUTH WEEKLY 12 capsule 2    fenofibrate 160 MG Tab TAKE 1 TABLET EVERY MORNING 90 tablet 1    furosemide (LASIX) 20 MG tablet TAKE 1 TABLET TWICE DAILY 180 tablet 3    gabapentin (NEURONTIN) 100 MG capsule TAKE 2 CAPSULES EVERY MORNING AND TAKE 3 CAPSULES EVERY DAY WITH DINNER 450 capsule 12    glimepiride (AMARYL) 2 MG tablet TAKE 1 TABLET EVERY DAY BEFORE BREAKFAST 90 tablet 2    hydrALAZINE (APRESOLINE) 50 MG tablet Take 1 tablet (50 mg total) by mouth 2 (two) times a day. 180 tablet 3    HYDROcodone-acetaminophen (NORCO) 5-325 mg per tablet Take 1 tablet by mouth every 8 (eight) hours as needed for Pain. 15 tablet 0    insulin degludec (TRESIBA FLEXTOUCH U-100) 100 unit/mL (3 mL) insulin pen Inject 20 Units into the skin once daily. 30 mL 4    isosorbide mononitrate (IMDUR) 60 MG 24 hr tablet Take 1 tablet (60 mg total) by mouth once daily. 90 tablet 11    ketoconazole (NIZORAL) 2 % cream Apply topically once daily. 60 g 6    lancets Misc Check blood glucose 2x/day. True metrix. E11.65 200 each 3    magnesium 250 mg Tab Take 1 tablet by mouth once daily.       metFORMIN (GLUCOPHAGE-XR) 500 MG ER 24hr tablet Take 1 tablet with breakfast and 2 tablets with supper. 270 tablet 2    metoclopramide HCl (REGLAN) 10 MG tablet Take 1 tablet (10 mg total) by mouth every 6 (six) hours as needed (Nausea). 30 tablet 0    metoclopramide HCl (REGLAN) 10 MG tablet Take 1 tablet (10 mg total) by mouth every 6 (six) hours as needed (Nausea). 30 tablet 0    nitroGLYCERIN (NITROSTAT) 0.4 MG SL tablet Place 1 tablet (0.4 mg total) under the tongue every 5 (five)  minutes as needed for Chest pain. 25 tablet 11    omega-3 acid ethyl esters (LOVAZA) 1 gram capsule Take 2 capsules (2 g total) by mouth 2 (two) times daily. 360 capsule 3    ondansetron (ZOFRAN-ODT) 4 MG TbDL Dissolve 1 tablet (4 mg total) by mouth every 6 (six) hours as needed (nausea). 10 tablet 0    pantoprazole (PROTONIX) 40 MG tablet TAKE 1 TABLET EVERY DAY 90 tablet 3    pantoprazole (PROTONIX) 40 MG tablet Take 1 tablet (40 mg total) by mouth once daily. 30 tablet 0    semaglutide (OZEMPIC) 2 mg/dose (8 mg/3 mL) PnIj Inject 2 mg into the skin every 7 days. 4 each 3    semaglutide (OZEMPIC) 2 mg/dose (8 mg/3 mL) PnIj Inject 2 mg into the skin every 7 days. 1 each 0    tiZANidine (ZANAFLEX) 4 MG tablet Take 1 tablet (4 mg total) by mouth every 6 (six) hours as needed (pain). 360 tablet 3    triazolam (HALCION) 0.25 MG Tab TAKE 1 TABLET BY MOUTH EVERY NIGHT AT BEDTIME AS NEEDED 90 tablet 5    TRUEPLUS LANCETS 33 gauge [facility]        Current Facility-Administered Medications on File Prior to Visit   Medication Dose Route Frequency Provider Last Rate Last Admin    cyclopentolate 1% ophthalmic solution 1 drop  1 drop Left Eye On Call Procedure Nickolas Hong MD   1 drop at 02/24/22 0901    ofloxacin 0.3 % ophthalmic solution 1 drop  1 drop Left Eye On Call Procedure Nickolas Hong MD   2 drop at 02/24/22 1017    sodium chloride 0.9% flush 10 mL  10 mL Intravenous PRN Nickolas Hong MD        triamcinolone acetonide injection 40 mg  40 mg Intra-articular  Valerie Olivera MD   40 mg at 02/09/23 1030       Objective:      BP (!) 140/65 (BP Location: Right arm, Patient Position: Sitting, BP Method: Medium (Automatic))   Pulse (!) 52   Resp 20   SpO2 100%     Exam:  GEN:  Well developed, well nourished.  No acute distress.   HEENT:  No trauma.  Mucous membranes moist.  Nares patent bilaterally.  PSYCH: Normal affect. Thought content appropriate.  CHEST:  Breathing symmetric.  No audible  wheezing.  ABD: Soft, non-distended.  SKIN:  Warm, pink, dry.  No rash on exposed areas.    EXT:  No cyanosis, clubbing, or edema.  No color change or changes in nail or hair growth.  NEURO/MUSCULOSKELETAL:  Fully alert, oriented, and appropriate. Speech normal ivania. No cranial nerve deficits.   Gait: Normal normal.  No focal motor deficits.         Imaging:    Narrative & Impression    EXAMINATION:  MRI LUMBAR SPINE WITHOUT CONTRAST     CLINICAL HISTORY:  Lumbar radiculopathy, symptoms persist with conservative treatment; Spondylosis without myelopathy or radiculopathy, lumbar region     TECHNIQUE:  Multiplanar, multisequence MR images were acquired from the thoracolumbar junction to the sacrum without the administration of contrast.     COMPARISON:  02/11/2019.     FINDINGS:  There is susceptibility weighted artifact within the midline lower lumbar spine, suggestive of prior instrumentation.     The lumbar alignment is within normal limits.  There are scattered Schmorl's nodes.  The vertebral body heights are maintained.  The bone marrow signal is within normal limits.     The conus terminates at the level of T12.  The cauda equina nerve roots are within normal limits.     There is multilevel disc desiccation.  Evaluation of the individual disc levels reveals the following:     L1-L2, there is a disc bulge along with facet hypertrophy and ligamentum flavum hypertrophy.  The spinal canal and neural foramina are unremarkable.     L2-L3, there is a disc bulge along with facet hypertrophy and ligamentum flavum hypertrophy.  The spinal canal and neural foramina are unremarkable There is small amount of fluid within the facet joints.     L3-L4, there is diffuse disc bulge along with facet hypertrophy and ligamentum flavum.  The spinal canal is within normal limits.  There is mild spinal canal narrowing.     L4-L5, there is diffuse disc bulge along with facet hypertrophy and ligamentum flavum hypertrophy.  There is  superimposed central disc protrusion.  There is marked narrowing of the lateral recesses.  There is mild to moderate narrowing the spinal canal.  There is moderate bilateral neural foraminal narrowing.     L5-S1, there is diffuse disc bulge along with facet hypertrophy and ligamentum flavum.  The spinal canal is within normal limits.  There is moderate neural foraminal narrowing.     The paraspinal soft tissues are unremarkable.     Impression:     Changes of prior instrumentation in the lower lumbar spine at the L4-L5 level.  Unchanged appearance of soft tissue at the prior disc site at the L4-L5 level.     Mild to moderate narrowing of the spinal canal at the L4-L5 level.     Mild-to-moderate neural foraminal narrowing the lower lumbar spine as above.        Electronically signed by: Kevin Erickson MD  Date:                                            07/18/2023  Time:                                           09:04       Narrative & Impression    EXAMINATION:  XR CERVICAL SPINE AP LAT WITH FLEX EXTEN     CLINICAL HISTORY:  Other cervical disc degeneration, unspecified cervical region     TECHNIQUE:  Three views of the cervical spine plus flexion and extension views were performed.     COMPARISON:  None     FINDINGS:  Cervical spine is normal in alignment, vertebral body heights are maintained.  No displaced fracture or subluxation.  No suspicious bone lesion.     No instability between the flexion and extension images.     Mild and moderate multilevel degenerative disc disease and uncovertebral DJD.  Anterior osteophytosis at levels C2-3, C4-5, and C5-6.  Mild multilevel facet DJD.     Unremarkable soft tissues.     Impression:     No acute abnormality.  No instability.     Mild and moderate multilevel DJD.        Electronically signed by: Marco Vásquez MD  Date:                                            08/18/2021  Time:                                           08:35           Narrative     EXAMINATION:  MRI  LUMBAR SPINE WITHOUT CONTRAST    CLINICAL HISTORY:  lumbar ddd; Other intervertebral disc degeneration, lumbar region    TECHNIQUE:  Multiplanar, multisequence MR images were acquired from the thoracolumbar junction to the sacrum without the administration of contrast.    COMPARISON:  08/23/2017    FINDINGS:  There is no evidence of fracture or marrow replacement process.  There is disc desiccation at all lumbar levels with disc space narrowing at multiple levels but most significant at L4-5.  Sagittal alignment is maintained.  Conus terminates at T12-L1.  Visualized retroperitoneal structures demonstrate no significant abnormalities.    T12-L1, L1-L2: Mild diffuse disc bulge with no significant central canal stenosis or neural foraminal narrowing.    L2-L3: Mild diffuse disc bulge with moderate facet arthropathy.  No significant central canal stenosis or neural foraminal narrowing.    L3-4: Mild diffuse disc bulge extending into both neural foramen with moderate facet arthropathy causing moderate right and mild left neural foraminal narrowing.  No significant central canal stenosis.    L4-5: Hemilaminectomy defect on the right.  There is a diffuse disc bulge extending into both neural foramen with a superimposed central extrusion with an annular fissure extending slightly below the level of the disc plane.  This causes mass effect on the lateral recess and may impinge on the descending nerve roots although the canal is decompressed posteriorly by the laminectomy defect with no significant central canal stenosis.  There is severe facet arthropathy contributing to severe bilateral neural foraminal narrowing.    L5-S1: There is severe facet arthropathy.  There is a diffuse disc bulge with no significant central canal stenosis.  There is severe bilateral neural foraminal narrowing..   Impression       Postoperative changes at L4 on the right with decompression of the central canal.  There is lumbar spondylosis most  significant at L4-5 and L5-S1 where there is severe bilateral neural foraminal narrowing.  Specific details at each level are discussed above.      Electronically signed by: Quinton Goins MD  Date: 02/12/2019  Time: 10:10         Assessment:       Encounter Diagnoses   Name Primary?    DDD (degenerative disc disease), lumbar Yes    Lumbar radiculopathy     Lumbar radiculopathy, chronic     Lumbar spondylosis      Plan:       Driss was seen today for follow-up.    Diagnoses and all orders for this visit:    DDD (degenerative disc disease), lumbar    Lumbar radiculopathy    Lumbar radiculopathy, chronic    Lumbar spondylosis      Driss Hernandez Jr. is a 72 y.o. male with worsening chronic bilateral low back pain.  Also with relatively new or symptoms concerning for right L3/L4 radicular pain.  No overt neurologic deficits noted on examination.  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  This is chronic.  Mild improvement following recent right L5 TF ANNIE.    Prior records reviewed.  Pertinent imaging studies reviewed by me. Imaging results were discussed with patient.  Patient is s/p right L5 transforaminal epidural steroid injection with 30% relief.  Patient continues to note significant pain mainly located in his right anterior thigh from his hip to his knee.  Discussed various treatment options including additional physical therapy, and neurosurgical referral.  Patient deferred at this time as he feels his symptoms are overall tolerable.  We may consider alternative interventional procedure to target the right-sided foraminal stenosis noted at L4-5.  May consider right Infraneural L3, S1 TF ANNIE in the future.  Patient can continue with medications since they are providing benefit, using them appropriately, and without adverse effects.  Return to clinic in 8 weeks or sooner if needed.  May consider alternative lumbar ANNIE versus referral to Neurosurgery at that time.    Grant German PA-C  [facility]  Interventional Pain Management   08/17/2023    This note was created by combination of typed  and M-Modal dictation.  Transcription and phonetic errors may be present.  If there are any questions, please contact me.

## 2023-08-18 ENCOUNTER — OFFICE VISIT (OUTPATIENT)
Dept: OTOLARYNGOLOGY | Facility: CLINIC | Age: 73
End: 2023-08-18
Payer: MEDICARE

## 2023-08-18 ENCOUNTER — CLINICAL SUPPORT (OUTPATIENT)
Dept: AUDIOLOGY | Facility: CLINIC | Age: 73
End: 2023-08-18
Payer: MEDICARE

## 2023-08-18 VITALS — BODY MASS INDEX: 28.41 KG/M2 | HEIGHT: 67 IN | WEIGHT: 181 LBS

## 2023-08-18 DIAGNOSIS — H61.22 IMPACTED CERUMEN OF LEFT EAR: ICD-10-CM

## 2023-08-18 DIAGNOSIS — H90.A22 SENSORINEURAL HEARING LOSS (SNHL) OF LEFT EAR WITH RESTRICTED HEARING OF RIGHT EAR: ICD-10-CM

## 2023-08-18 DIAGNOSIS — H69.91 DYSFUNCTION OF RIGHT EUSTACHIAN TUBE: Primary | ICD-10-CM

## 2023-08-18 DIAGNOSIS — H69.91 EUSTACHIAN TUBE DYSFUNCTION, RIGHT: ICD-10-CM

## 2023-08-18 DIAGNOSIS — J34.3 HYPERTROPHY OF INFERIOR NASAL TURBINATE: ICD-10-CM

## 2023-08-18 DIAGNOSIS — H90.A31 MIXED CONDUCTIVE AND SENSORINEURAL HEARING LOSS OF RIGHT EAR WITH RESTRICTED HEARING OF LEFT EAR: Primary | ICD-10-CM

## 2023-08-18 PROCEDURE — 3066F NEPHROPATHY DOC TX: CPT | Mod: CPTII,S$GLB,, | Performed by: OTOLARYNGOLOGY

## 2023-08-18 PROCEDURE — 69210 PR REMOVAL IMPACTED CERUMEN REQUIRING INSTRUMENTATION, UNILATERAL: ICD-10-PCS | Mod: S$GLB,,, | Performed by: OTOLARYNGOLOGY

## 2023-08-18 PROCEDURE — 3288F FALL RISK ASSESSMENT DOCD: CPT | Mod: CPTII,S$GLB,, | Performed by: OTOLARYNGOLOGY

## 2023-08-18 PROCEDURE — 92550 PR TYMPANOMETRY AND REFLEX THRESHOLD MEASUREMENTS: ICD-10-PCS | Mod: S$GLB,,,

## 2023-08-18 PROCEDURE — 3061F NEG MICROALBUMINURIA REV: CPT | Mod: CPTII,S$GLB,, | Performed by: OTOLARYNGOLOGY

## 2023-08-18 PROCEDURE — 99204 OFFICE O/P NEW MOD 45 MIN: CPT | Mod: 25,S$GLB,, | Performed by: OTOLARYNGOLOGY

## 2023-08-18 PROCEDURE — 3044F HG A1C LEVEL LT 7.0%: CPT | Mod: CPTII,S$GLB,, | Performed by: OTOLARYNGOLOGY

## 2023-08-18 PROCEDURE — 3044F PR MOST RECENT HEMOGLOBIN A1C LEVEL <7.0%: ICD-10-PCS | Mod: CPTII,S$GLB,, | Performed by: OTOLARYNGOLOGY

## 2023-08-18 PROCEDURE — 99204 PR OFFICE/OUTPT VISIT, NEW, LEVL IV, 45-59 MIN: ICD-10-PCS | Mod: 25,S$GLB,, | Performed by: OTOLARYNGOLOGY

## 2023-08-18 PROCEDURE — 3008F BODY MASS INDEX DOCD: CPT | Mod: CPTII,S$GLB,, | Performed by: OTOLARYNGOLOGY

## 2023-08-18 PROCEDURE — 92550 TYMPANOMETRY & REFLEX THRESH: CPT | Mod: S$GLB,,,

## 2023-08-18 PROCEDURE — 69210 REMOVE IMPACTED EAR WAX UNI: CPT | Mod: S$GLB,,, | Performed by: OTOLARYNGOLOGY

## 2023-08-18 PROCEDURE — 92557 PR COMPREHENSIVE HEARING TEST: ICD-10-PCS | Mod: S$GLB,,,

## 2023-08-18 PROCEDURE — 1101F PR PT FALLS ASSESS DOC 0-1 FALLS W/OUT INJ PAST YR: ICD-10-PCS | Mod: CPTII,S$GLB,, | Performed by: OTOLARYNGOLOGY

## 2023-08-18 PROCEDURE — 1101F PT FALLS ASSESS-DOCD LE1/YR: CPT | Mod: CPTII,S$GLB,, | Performed by: OTOLARYNGOLOGY

## 2023-08-18 PROCEDURE — 1126F PR PAIN SEVERITY QUANTIFIED, NO PAIN PRESENT: ICD-10-PCS | Mod: CPTII,S$GLB,, | Performed by: OTOLARYNGOLOGY

## 2023-08-18 PROCEDURE — 3008F PR BODY MASS INDEX (BMI) DOCUMENTED: ICD-10-PCS | Mod: CPTII,S$GLB,, | Performed by: OTOLARYNGOLOGY

## 2023-08-18 PROCEDURE — 3061F PR NEG MICROALBUMINURIA RESULT DOCUMENTED/REVIEW: ICD-10-PCS | Mod: CPTII,S$GLB,, | Performed by: OTOLARYNGOLOGY

## 2023-08-18 PROCEDURE — 1126F AMNT PAIN NOTED NONE PRSNT: CPT | Mod: CPTII,S$GLB,, | Performed by: OTOLARYNGOLOGY

## 2023-08-18 PROCEDURE — 3288F PR FALLS RISK ASSESSMENT DOCUMENTED: ICD-10-PCS | Mod: CPTII,S$GLB,, | Performed by: OTOLARYNGOLOGY

## 2023-08-18 PROCEDURE — 3066F PR DOCUMENTATION OF TREATMENT FOR NEPHROPATHY: ICD-10-PCS | Mod: CPTII,S$GLB,, | Performed by: OTOLARYNGOLOGY

## 2023-08-18 PROCEDURE — 92557 COMPREHENSIVE HEARING TEST: CPT | Mod: S$GLB,,,

## 2023-08-18 RX ORDER — FLUTICASONE PROPIONATE 50 MCG
2 SPRAY, SUSPENSION (ML) NASAL 2 TIMES DAILY
Qty: 18.2 ML | Refills: 3 | Status: SHIPPED | OUTPATIENT
Start: 2023-08-18

## 2023-08-18 RX ORDER — AZELASTINE 1 MG/ML
1 SPRAY, METERED NASAL 2 TIMES DAILY
Qty: 30 ML | Refills: 3 | Status: SHIPPED | OUTPATIENT
Start: 2023-08-18

## 2023-08-18 NOTE — PROGRESS NOTES
Mr. Driss Hernandez Jr., a 72 y.o. male, was seen in the clinic today for an audiological evaluation. Mr. Hernandez's main complaint was muffled hearing in right ear.    Otoscopy clear bilaterally. Tympanometry testing revealed a Type C tympanogram for the right ear and a Type A tympanogram for the left ear. Ipsilateral acoustic reflexes were present at 8803-2952 Hz for the right ear and were present at 1000 Hz for the left ear.    Audiological testing revealed a mild to moderately severe mixed hearing loss (MHL) for the right ear and a mild to moderate high frequency sensorineural hearing loss (SNHL) for the left ear. A speech reception threshold was obtained at 25 dBHL for the right ear and at 20 dBHL for the left ear. Speech discrimination was 92% for the right ear and 96% for the left ear.      Recommendations:  1. Otologic evaluation  2. Audiological testing in conjunction with medical treatment  3. Hearing protection when in noise   4. Hearing aid consultation following medical clearance if Mr. Hernandez feels hearing loss negatively impacts quality of life     Please click on link to view Audiogram:  Document on 8/18/2023  3:37 PM by Buffy Cornelius AU.D: Audiogram

## 2023-08-18 NOTE — PROGRESS NOTES
OTOLARYNGOLOGY CLINIC NOTE  Date:  08/18/2023     Chief complaint:  Chief Complaint   Patient presents with    Cerumen Impaction     Left > right       History of Present Illness  Driss Hernandez Jr. is a 72 y.o. male  presenting today for a new evaluation and treatment of   Hearing loss.  Removed wax on right side with a nano pin   Unsure if hearing has improved since wax came out     Had some congestion a few weeks ago in his nose but he thinks he may have had covid. Normally his left ear bothers him more than the right ear.   Hearing feels muffled on right   No ear pain   No otorrhea. Sometimes ears itch   Left ear typically is the one that has popping sensations    Past Medical History  Past Medical History:   Diagnosis Date    Chronic heart failure with preserved ejection fraction 2/9/2023    Chronic midline low back pain without sciatica 10/2/2017    Colon polyps     Coronary artery disease involving native coronary artery of native heart 3/5/2020    Coronary artery disease involving native coronary artery of native heart with angina pectoris 12/10/2020    Diabetes mellitus with neurological manifestations, uncontrolled 1/24/2017    Diabetic polyneuropathy associated with type 2 diabetes mellitus 1/24/2017    Diabetic polyneuropathy associated with type 2 diabetes mellitus     Essential hypertension 1/24/2017    Gastroesophageal reflux disease 1/24/2017    Hyperlipidemia 1/24/2017    Insomnia 1/24/2017    Long-term insulin use 1/24/2017    Longstanding persistent atrial fibrillation 12/30/2020    Lumbar spondylosis 11/13/2017    Nuclear sclerosis of both eyes 8/24/2017    Obesity     PAD (peripheral artery disease) 3/23/2022    Stage 3 chronic kidney disease 2/13/2019    Uncontrolled type 2 diabetes mellitus without complication, with long-term current use of insulin 1/24/2017        Past Surgical History  Past Surgical History:   Procedure Laterality Date    ARTHROSCOPIC REPAIR OF ROTATOR CUFF OF  SHOULDER Right 7/5/2022    Procedure: REPAIR, ROTATOR CUFF, ARTHROSCOPIC;  Surgeon: Valerie Olivera MD;  Location: [facility] OR;  Service: Orthopedics;  Laterality: Right;  HETAL LEMUS [phone]/ TEXTED ALLAN ON 6/23/2022 @ 9:47AM. ALLAN RESPONDED ON 6/23/2022 @ 10:33AM-BREANNA BABIN [phone] MY BE HERE FOR CASE SPOKE TO HIM @ 9:59AM ON 7-1-2022  RN PREOP 6/28/2022    BACK SURGERY      2002    CATARACT EXTRACTION W/  INTRAOCULAR LENS IMPLANT Right 08/29/2017    Dr. Hong    CATARACT EXTRACTION W/  INTRAOCULAR LENS IMPLANT Left 02/24/2022    Dr. Hong    COLONOSCOPY N/A 2/21/2017    Procedure: COLONOSCOPY;  Surgeon: Robb Rosado MD;  Location: [facility] ENDO;  Service: Endoscopy;  Laterality: N/A;    COLONOSCOPY N/A 7/5/2023    Procedure: COLONOSCOPY;  Surgeon: Aston Varela MD;  Location: [facility] ENDO;  Service: Endoscopy;  Laterality: N/A;  Referral: JOVANNI SCANLON  ok to hold Plavix 5 days and Eliquis 2 days per Dr Ford   Meds  Suprep   Inst portal   LW    CORONARY ANGIOGRAPHY INCLUDING BYPASS GRAFTS WITH CATHETERIZATION OF LEFT HEART N/A 11/11/2020    Procedure: ANGIOGRAM, CORONARY, INCLUDING BYPASS GRAFT, WITH LEFT HEART CATHETERIZATION;  Surgeon: Lane Cuellar MD;  Location: [facility] CATH LAB;  Service: Cardiology;  Laterality: N/A;  RN PRE OP 11-5-2020. --COVID NEGATIVE ON  11-. C A    CORONARY ARTERY BYPASS GRAFT      March 2016    EPIDURAL STEROID INJECTION Bilateral 11/14/2018    Procedure: Lumbar Medial Branch Blocks;  Surgeon: Eze Byrd Jr., MD;  Location: [facility] ENDO;  Service: Pain Management;  Laterality: Bilateral;  Bilateral Lumbar Medial Branch Blocks L2, L3, L4, L5    [id number]  [id number]    Arrive @ 1150; NO Sedation    EPIDURAL STEROID INJECTION Bilateral 11/28/2018    Procedure: Injection, Steroid, Epidural;  Surgeon: Eze Byrd Jr., MD;  Location: [facility] ENDO;  Service: Pain Management;  Laterality: Bilateral;  Bilateral Sacroiliac Joint  "Steroid Injections    [id number]    Arrive @ 0910    EPIDURAL STEROID INJECTION Bilateral 3/20/2019    Procedure: Injection, Steroid, Sacroiliiac Joint;  Surgeon: Eze Byrd Jr., MD;  Location: [facility] ENDO;  Service: Pain Management;  Laterality: Bilateral;  Bilateral Sacroiliac Joint Steroid Injections    [id number]    Arrive @ 1145; Trigger point injections also?    EPIDURAL STEROID INJECTION Right 8/2/2023    Procedure: Right L5 Transforaminal Epidural Steroid Injection;  Surgeon: Eze Byrd Jr., MD;  Location: [facility] ENDO;  Service: Pain Management;  Laterality: Right;  @0830 (given)  Eliquis last 7/29  Plavix last 7/27  Check BG    INTRAOCULAR PROSTHESES INSERTION Left 2/24/2022    Procedure: INSERTION, IOL PROSTHESIS;  Surgeon: Nickolas Hong MD;  Location: [facility] OR;  Service: Ophthalmology;  Laterality: Left;    PHACOEMULSIFICATION OF CATARACT Left 2/24/2022    Procedure: PHACOEMULSIFICATION, CATARACT;  Surgeon: Nickolas Hong MD;  Location: [facility] OR;  Service: Ophthalmology;  Laterality: Left;  RN Phone Pre Op 2-16-22.  Covid NEGATIVE  2-23-22.  Arrival 08:00 am.    TONSILLECTOMY          Medications  Current Outpatient Medications on File Prior to Visit   Medication Sig Dispense Refill    albuterol (PROVENTIL/VENTOLIN HFA) 90 mcg/actuation inhaler Inhale 2 puffs into the lungs every 4 (four) hours as needed for Shortness of Breath. Rescue 17 g 3    apixaban (ELIQUIS) 5 mg Tab Take 1 tablet (5 mg total) by mouth 2 (two) times daily. 180 tablet 3    ascorbic acid, vitamin C, (VITAMIN C) 100 MG tablet Take 100 mg by mouth daily as needed.      atorvastatin (LIPITOR) 80 MG tablet Take 1 tablet (80 mg total) by mouth every evening. 90 tablet 3    b complex vitamins tablet Take 1 tablet by mouth once daily.      BD ALCOHOL SWABS PadM       BD ULTRA-FINE ROLAND PEN NEEDLES 32 gauge x 5/32" Ndle       blood sugar diagnostic Strp Check blood glucose 2 times a day. True metrix. E11.65 200 strip 3    " blood-glucose meter kit Test glucose 2-3x/day. True metrix 1 each 0    celecoxib (CELEBREX) 100 MG capsule TAKE 1 CAPSULE(100 MG) BY MOUTH EVERY DAY 30 capsule 6    clopidogreL (PLAVIX) 75 mg tablet TAKE 1 TABLET(75 MG) BY MOUTH EVERY DAY 90 tablet 3    coenzyme Q10 100 mg capsule Take 100 mg by mouth once daily.      dapagliflozin (FARXIGA) 5 mg Tab tablet Take 1 tablet (5 mg total) by mouth once daily. 30 tablet 3    ergocalciferol (VITAMIN D2) 50,000 unit Cap TAKE ONE CAPSULE BY MOUTH WEEKLY 12 capsule 2    fenofibrate 160 MG Tab TAKE 1 TABLET EVERY MORNING 90 tablet 1    furosemide (LASIX) 20 MG tablet TAKE 1 TABLET TWICE DAILY 180 tablet 3    gabapentin (NEURONTIN) 100 MG capsule TAKE 2 CAPSULES EVERY MORNING AND TAKE 3 CAPSULES EVERY DAY WITH DINNER 450 capsule 12    glimepiride (AMARYL) 2 MG tablet TAKE 1 TABLET EVERY DAY BEFORE BREAKFAST 90 tablet 2    hydrALAZINE (APRESOLINE) 50 MG tablet Take 1 tablet (50 mg total) by mouth 2 (two) times a day. 180 tablet 3    HYDROcodone-acetaminophen (NORCO) 5-325 mg per tablet Take 1 tablet by mouth every 8 (eight) hours as needed for Pain. 15 tablet 0    insulin degludec (TRESIBA FLEXTOUCH U-100) 100 unit/mL (3 mL) insulin pen Inject 20 Units into the skin once daily. 30 mL 4    isosorbide mononitrate (IMDUR) 60 MG 24 hr tablet Take 1 tablet (60 mg total) by mouth once daily. 90 tablet 11    ketoconazole (NIZORAL) 2 % cream Apply topically once daily. 60 g 6    lancets Misc Check blood glucose 2x/day. True metrix. E11.65 200 each 3    magnesium 250 mg Tab Take 1 tablet by mouth once daily.       metFORMIN (GLUCOPHAGE-XR) 500 MG ER 24hr tablet Take 1 tablet with breakfast and 2 tablets with supper. 270 tablet 2    metoclopramide HCl (REGLAN) 10 MG tablet Take 1 tablet (10 mg total) by mouth every 6 (six) hours as needed (Nausea). 30 tablet 0    metoclopramide HCl (REGLAN) 10 MG tablet Take 1 tablet (10 mg total) by mouth every 6 (six) hours as needed (Nausea). 30 tablet  0    nitroGLYCERIN (NITROSTAT) 0.4 MG SL tablet Place 1 tablet (0.4 mg total) under the tongue every 5 (five) minutes as needed for Chest pain. 25 tablet 11    omega-3 acid ethyl esters (LOVAZA) 1 gram capsule Take 2 capsules (2 g total) by mouth 2 (two) times daily. 360 capsule 3    ondansetron (ZOFRAN-ODT) 4 MG TbDL Dissolve 1 tablet (4 mg total) by mouth every 6 (six) hours as needed (nausea). 10 tablet 0    pantoprazole (PROTONIX) 40 MG tablet TAKE 1 TABLET EVERY DAY 90 tablet 3    pantoprazole (PROTONIX) 40 MG tablet Take 1 tablet (40 mg total) by mouth once daily. 30 tablet 0    semaglutide (OZEMPIC) 2 mg/dose (8 mg/3 mL) PnIj Inject 2 mg into the skin every 7 days. 4 each 3    semaglutide (OZEMPIC) 2 mg/dose (8 mg/3 mL) PnIj Inject 2 mg into the skin every 7 days. 1 each 0    tiZANidine (ZANAFLEX) 4 MG tablet Take 1 tablet (4 mg total) by mouth every 6 (six) hours as needed (pain). 360 tablet 3    triazolam (HALCION) 0.25 MG Tab TAKE 1 TABLET BY MOUTH EVERY NIGHT AT BEDTIME AS NEEDED 90 tablet 5    TRUEPLUS LANCETS 33 gauge [facility]        Current Facility-Administered Medications on File Prior to Visit   Medication Dose Route Frequency Provider Last Rate Last Admin    cyclopentolate 1% ophthalmic solution 1 drop  1 drop Left Eye On Call Procedure Nickolas Hong MD   1 drop at 02/24/22 0901    ofloxacin 0.3 % ophthalmic solution 1 drop  1 drop Left Eye On Call Procedure Nickolas Hong MD   2 drop at 02/24/22 1017    sodium chloride 0.9% flush 10 mL  10 mL Intravenous PRN Nickolas Hong MD        triamcinolone acetonide injection 40 mg  40 mg Intra-articular  Valerie Olivera MD   40 mg at 02/09/23 1030       Review of Systems  Review of Systems   Constitutional: Negative.    HENT:  Positive for hearing loss.    Eyes: Negative.    Respiratory: Negative.     Cardiovascular: Negative.    Gastrointestinal:  Positive for abdominal pain and diarrhea.   Genitourinary: Negative.   "  Musculoskeletal:  Positive for back pain.   Skin:  Positive for rash.   Neurological: Negative.    Endo/Heme/Allergies:  Bruises/bleeds easily.   Psychiatric/Behavioral: Negative.          Social History   reports that he has never smoked. He has never been exposed to tobacco smoke. He has never used smokeless tobacco. He reports current alcohol use. He reports that he does not use drugs.     Family History  Family History   Problem Relation Age of Onset    Depression Mother     Heart disease Mother     Stroke Father     No Known Problems Sister     Diabetes Sister     No Known Problems Sister     Blindness Brother     No Known Problems Maternal Aunt     No Known Problems Maternal Uncle     No Known Problems Paternal Aunt     No Known Problems Paternal Uncle     No Known Problems Maternal Grandmother     No Known Problems Maternal Grandfather     No Known Problems Paternal Grandmother     No Known Problems Paternal Grandfather     Amblyopia Neg Hx     Cancer Neg Hx     Cataracts Neg Hx     Glaucoma Neg Hx     Hypertension Neg Hx     Macular degeneration Neg Hx     Retinal detachment Neg Hx     Strabismus Neg Hx     Thyroid disease Neg Hx         Physical Exam   There were no vitals filed for this visit. Body mass index is 28.35 kg/m².  Weight: 82.1 kg (181 lb)   Height: 5' 7" (170.2 cm)     GENERAL: no acute distress.  HEAD: normocephalic.   EYES: lids and lashes normal. No scleral icterus  EARS: external ear without lesion, normal pinna shape and position. Right  External auditory canal with normal cerumen, tympanic membrane fully visible, no perforation , no retraction. No middle ear effusion. Ossicles intact.micro exam also performed- no cholesteatoma or middle ear effusion or other abnormality  Left cerumen impaction -removed, see below  NOSE: external nose without significant bony abnormality mild turbinate hypertrophy  ORAL CAVITY/OROPHARYNX: tongue  mobile.   NECK: trachea midline.   RESPIRATORY: no " stridor, no stertor. Voice normal. Respirations nonlabored.  NEURO: alert, responds to questions appropriately. AC> BC. Lateralized to right  PSYCH:mood appropriate    Procedure Note   Procedure performed:examination of ears with cerumen disimpaction    Indication for procedure: left cerumen impaction     Description of procedure:  After verbal consent was obtained and with the patient in seated position and the operating head otoscope was inserted into the left ear.  Otologic instruments including various size otologic suctions and curette were used to remove the cerumen from the right external auditory canals under visualization with the operating head otoscope. After cleaning, visualization was again performed  of the ear canal, tympanic membrane, ossicles and middle ear space. Findings as indicated below. All portions of the procedure and examination were tolerated well without complication.     Findings:  Left ear: Complete cerumen impaction removed entirely revealing normal external auditory canal; tympanic membrane without bulging, retraction, or perforation; no evidence of middle ear fluid or effusion.     AUDIOLOGY RESULTS  Audiometric evaluation including audiogram, tympanometry, acoustic reflexes, and speech discrimination which was performed  8-18-23was personally reviewed and interpreted.  Notable findings on the audiogram were normal sloping to moderate/moderate-severe sensorineural hearing loss (SNHL) for the left ear and mild sloping to moderate/moderate-severe hearing loss on the left- possible mixed component -  Air bone gap at 1 khz and 4 khz for right ear. Air bone gap for left ear at 4 khz only. Tympanometry revealed Type A tympanogram on the left and Type C tympanogram on the right. Speech discrimination was 96%  on the left, and 92% on the right.     Report of the audiologist performing this audiometric testing was also reviewed   Imaging:  The patient does not have any pertinent and/or recent  imaging of the head and neck.     Labs:  CBC  Recent Labs   Lab 06/09/22  0826 06/16/23  0953 06/24/23  2118   WBC 5.56 5.42 7.09   Hemoglobin 10.8 L 11.7 L 10.8 L   Hematocrit 36.7 L 41.4 37.0 L   MCV 77 L 69 L 69 L   Platelets 319 308 245     BMP  Recent Labs   Lab 11/11/22  0729 05/22/23  0906 06/16/23  0953 06/24/23  2118   Glucose 196 H  --  112 H 81   Sodium 139  --  140 140   Potassium 4.9  --  4.6 4.2   Chloride 104  --  101 107   CO2 24  --  29 23   BUN 25 H  --  27 H 25 H   Creatinine 1.4 1.5 H 1.5 H 1.4   Calcium 9.5  --  10.3 9.4     COAGS  Recent Labs   Lab 04/21/21  0921 05/10/21  0945 06/24/23  2217   INR 2.0 H 2.9 H 1.1       Assessment  1. Impacted cerumen of left ear  - Ambulatory referral/consult to ENT    2. Dysfunction of right eustachian tube    3. Hypertrophy of inferior nasal turbinate    4. Sensorineural hearing loss (SNHL) of left ear with restricted hearing of right ear       Plan:  Discussed plan of care with patient in detail and all questions answered. Patient reported understanding of plan of care. I gave the patient the opportunity to ask questions and patient confirmed all questions answered to satisfaction.     Trial of nasal spray regimen for at least 2 weeks to see if ear symptoms change. Counseled on importance of saline and how to do regimen   Hearing test again in 6 months  Discussed possible ct temporal bone, eval with otologist if desires  Counseled on potential for hearing aid to help prevent alzheimers. He does not want a hearing aid at this time.   Counseled not to use afrin and risk of rhinitis medicamentosa  Notify me if muffled hearing on right improved with nasal sprays, if does improve can continue.     F/u 6 months with hearing test, sooner if issue        Please be aware that this note has been generated with the assistance of MModal voice-to-text.  Please excuse any spelling or grammatical errors.

## 2023-08-18 NOTE — PATIENT INSTRUCTIONS
"Information and instructions from your visit with me today:  Use saline each time before medication sprays  , use for at least 2 weeks to see if helps with ear fullness. If better can continue to use if desired.   Start using the following medication nasal sprays:   Fluticasone spray:    This medication is a steroid spray. It stays within the nose and does not have absorption into the body that leads to side effects that one has with oral steroid medication. Fluticasone nasal spray is the same as the Flonase brand nasal spray. Discuss with your pharmacist if the price is lower over the counter or with a prescription ( this varies depending on insurance). The medication that is over the counter is the same as the prescription medication. Use this medication as instructed on the prescription, 1-2 sprays on each side of your nose twice daily.     Azelastine  spray:  This medication is an antihistamine used to treat nasal symptoms of allergy, which works specifically in the nose unlike antihistamine pills which have more of an effect on the whole body. Use this medication as instructed on the prescription, 1 spray on each side of your nose twice daily.     Additional instructions for medication sprays  Place the tip of the medication bottle in your nose and aim slightly up and out on each side to get medication high and deep into your nose and sinuses, and not have it all deposit in the very front of your nose. Aim the tip of the nozzle towards the outer corner of your eye . You can imagine aiming towards the back of your eyeball on each side for this, as opposed to straight back to the center of your nose and head.     You need to use this medication every day regardless of symptoms, as it takes time ( a few weeks) to work and get the benefits. It does not work on an "as needed" basis like taking a decongestant. If your symptoms only occur in a particular season, then the medication can be used seasonally instead of " year long. For seasonal symptoms, you should start using the spray twice daily a month before when you normally have symptoms ( for example, if symptoms start in August, should start at the end of June).     Start nasal irrigations with saline solution- you can either use a rinse or a mist spray:        NASAL SALINE SPRAY ( simply saline and arm and hammer are examples) There are several different brands found in the cold and flu aisle of the pharmacy. You can use any brand of saline spray - this will deliver the saline by a gentle mist ( if you have difficulty or discomfort with nasal rinse/ a lot of fluid in the nose, this will be more comfortable).       Always rinse your nose with saline prior to using medication sprays and wait a couple of hours before using again. You can use the saline throughout the day to help with stuffy nose or dry nose.    Do not use nasal decongestant sprays such as Afrin or similar products long term ( over 3 days) .  This can cause long term physical nasal addiction. Afrin should only be used if having nose bleeds, severe nasal congestion , or severe ear pain/fullness and should not be used for more than 2-3 days in a row . It is a not a medication that should be used for a long period of time.     It was nice meeting you today, and I look forward to helping you feel better soon. Please don't hesitate to call if you have any other questions or concerns, or if I can be of any assistance in the meantime.      Aye Carlisle MD    [facility]     Phone  [phone]    Fax      [phone]        Aye Carlisle MD  Otorhinolaryngology

## 2023-08-28 RX ORDER — PANTOPRAZOLE SODIUM 40 MG/1
TABLET, DELAYED RELEASE ORAL
Qty: 90 TABLET | Refills: 3 | Status: SHIPPED | OUTPATIENT
Start: 2023-08-28

## 2023-08-31 ENCOUNTER — OFFICE VISIT (OUTPATIENT)
Dept: GASTROENTEROLOGY | Facility: CLINIC | Age: 73
End: 2023-08-31
Payer: MEDICARE

## 2023-08-31 VITALS
BODY MASS INDEX: 27.78 KG/M2 | HEART RATE: 54 BPM | SYSTOLIC BLOOD PRESSURE: 154 MMHG | DIASTOLIC BLOOD PRESSURE: 82 MMHG | WEIGHT: 177 LBS | HEIGHT: 67 IN

## 2023-08-31 DIAGNOSIS — K21.9 GASTROESOPHAGEAL REFLUX DISEASE, UNSPECIFIED WHETHER ESOPHAGITIS PRESENT: ICD-10-CM

## 2023-08-31 DIAGNOSIS — D50.9 IRON DEFICIENCY ANEMIA, UNSPECIFIED IRON DEFICIENCY ANEMIA TYPE: Primary | ICD-10-CM

## 2023-08-31 PROCEDURE — 99204 OFFICE O/P NEW MOD 45 MIN: CPT | Mod: S$GLB,,, | Performed by: INTERNAL MEDICINE

## 2023-08-31 PROCEDURE — 99204 PR OFFICE/OUTPT VISIT, NEW, LEVL IV, 45-59 MIN: ICD-10-PCS | Mod: S$GLB,,, | Performed by: INTERNAL MEDICINE

## 2023-08-31 PROCEDURE — 1101F PT FALLS ASSESS-DOCD LE1/YR: CPT | Mod: CPTII,S$GLB,, | Performed by: INTERNAL MEDICINE

## 2023-08-31 PROCEDURE — 3077F SYST BP >= 140 MM HG: CPT | Mod: CPTII,S$GLB,, | Performed by: INTERNAL MEDICINE

## 2023-08-31 PROCEDURE — 99999 PR PBB SHADOW E&M-EST. PATIENT-LVL V: ICD-10-PCS | Mod: PBBFAC,,, | Performed by: INTERNAL MEDICINE

## 2023-08-31 PROCEDURE — 3066F PR DOCUMENTATION OF TREATMENT FOR NEPHROPATHY: ICD-10-PCS | Mod: CPTII,S$GLB,, | Performed by: INTERNAL MEDICINE

## 2023-08-31 PROCEDURE — 3061F PR NEG MICROALBUMINURIA RESULT DOCUMENTED/REVIEW: ICD-10-PCS | Mod: CPTII,S$GLB,, | Performed by: INTERNAL MEDICINE

## 2023-08-31 PROCEDURE — 1126F PR PAIN SEVERITY QUANTIFIED, NO PAIN PRESENT: ICD-10-PCS | Mod: CPTII,S$GLB,, | Performed by: INTERNAL MEDICINE

## 2023-08-31 PROCEDURE — 1126F AMNT PAIN NOTED NONE PRSNT: CPT | Mod: CPTII,S$GLB,, | Performed by: INTERNAL MEDICINE

## 2023-08-31 PROCEDURE — 3288F PR FALLS RISK ASSESSMENT DOCUMENTED: ICD-10-PCS | Mod: CPTII,S$GLB,, | Performed by: INTERNAL MEDICINE

## 2023-08-31 PROCEDURE — 1159F MED LIST DOCD IN RCRD: CPT | Mod: CPTII,S$GLB,, | Performed by: INTERNAL MEDICINE

## 2023-08-31 PROCEDURE — 3044F HG A1C LEVEL LT 7.0%: CPT | Mod: CPTII,S$GLB,, | Performed by: INTERNAL MEDICINE

## 2023-08-31 PROCEDURE — 3079F PR MOST RECENT DIASTOLIC BLOOD PRESSURE 80-89 MM HG: ICD-10-PCS | Mod: CPTII,S$GLB,, | Performed by: INTERNAL MEDICINE

## 2023-08-31 PROCEDURE — 3061F NEG MICROALBUMINURIA REV: CPT | Mod: CPTII,S$GLB,, | Performed by: INTERNAL MEDICINE

## 2023-08-31 PROCEDURE — 3077F PR MOST RECENT SYSTOLIC BLOOD PRESSURE >= 140 MM HG: ICD-10-PCS | Mod: CPTII,S$GLB,, | Performed by: INTERNAL MEDICINE

## 2023-08-31 PROCEDURE — 3079F DIAST BP 80-89 MM HG: CPT | Mod: CPTII,S$GLB,, | Performed by: INTERNAL MEDICINE

## 2023-08-31 PROCEDURE — 3008F BODY MASS INDEX DOCD: CPT | Mod: CPTII,S$GLB,, | Performed by: INTERNAL MEDICINE

## 2023-08-31 PROCEDURE — 1101F PR PT FALLS ASSESS DOC 0-1 FALLS W/OUT INJ PAST YR: ICD-10-PCS | Mod: CPTII,S$GLB,, | Performed by: INTERNAL MEDICINE

## 2023-08-31 PROCEDURE — 3008F PR BODY MASS INDEX (BMI) DOCUMENTED: ICD-10-PCS | Mod: CPTII,S$GLB,, | Performed by: INTERNAL MEDICINE

## 2023-08-31 PROCEDURE — 1159F PR MEDICATION LIST DOCUMENTED IN MEDICAL RECORD: ICD-10-PCS | Mod: CPTII,S$GLB,, | Performed by: INTERNAL MEDICINE

## 2023-08-31 PROCEDURE — 99999 PR PBB SHADOW E&M-EST. PATIENT-LVL V: CPT | Mod: PBBFAC,,, | Performed by: INTERNAL MEDICINE

## 2023-08-31 PROCEDURE — 3044F PR MOST RECENT HEMOGLOBIN A1C LEVEL <7.0%: ICD-10-PCS | Mod: CPTII,S$GLB,, | Performed by: INTERNAL MEDICINE

## 2023-08-31 PROCEDURE — 3288F FALL RISK ASSESSMENT DOCD: CPT | Mod: CPTII,S$GLB,, | Performed by: INTERNAL MEDICINE

## 2023-08-31 PROCEDURE — 3066F NEPHROPATHY DOC TX: CPT | Mod: CPTII,S$GLB,, | Performed by: INTERNAL MEDICINE

## 2023-08-31 NOTE — PROGRESS NOTES
[facility]   Clinic Note              Patient Name: Driss Hernandez Jr.  Age: 72 y.o.  Sex: male  MRN: [id number]    TODAY'S DATE:  8/31/2023  REFERRING PROVIDER:  Self, Aaareferral     Diagnosis:   1. Iron deficiency anemia, unspecified iron deficiency anemia type    2. Gastroesophageal reflux disease, unspecified whether esophagitis present      HPI:  Driss Hernandez Jr. is a 72 y.o. male with HTN, HLD, IDDM, CAD s/p CABG '16 on plavix, Afib on eliquis  who was referred for evaluation and treatment of NICHOLE.    For review, patient has not been seen in GI Clinic in the past.  He follows with Dr. Willoughby for cardiac disease, who in June noted PICA.  Labs were checked with Hgb 10.5, iron 35 and ferritin 17.  Colon has since been done with results below.    Today, patient states since reporting pica he has begun iron pill.  He did go to ER recently with chest pain and concern for cardiac event, with normal cardiac eval and recommendation to see GI.  Denies heartburn while on PPI.   He did lose 70 lbs over 6 year period, which has also helped heartburn.    CABG in the past.  Was admitted with partial SBO in 2018 but did not require surgery.      PRIOR ENDOSCOPY:  -Colonoscopy: 07/05/23:  Findings:        A 4 mm polyp was found in the cecum. The polyp was sessile. The        polyp was removed with a cold snare. Resection and retrieval were        complete.        A 5 mm polyp was found in the ascending colon. The polyp was        sessile. The polyp was removed with a cold snare. Resection and        retrieval were complete.        Three sessile polyps were found in the transverse colon. The polyps        were 4 to 6 mm in size. These polyps were removed with a cold snare.        Resection and retrieval were complete.        A few small and large-mouthed diverticula were found in the sigmoid        colon.        The exam was otherwise without abnormality.   Recommendation:        - Discharge patient  "to home (ambulatory).                          - Patient has a contact number available for                          emergencies. The signs and symptoms of potential                          delayed complications were discussed with the                          patient. Return to normal activities tomorrow.                          Written discharge instructions were provided to                          the patient.                          - Resume previous diet.                          - Continue present medications.                          - Return to primary care physician as previously                          scheduled.                          - Repeat colonoscopy in 5 years for surveillance.                          - Resume Eliquis (apixaban) at prior dose tomorrow.                          - Await pathology results.   Aston Varela MD     -EGD: --      ROS: Negative other than above      Outpatient Medications Marked as Taking for the 8/31/23 encounter (Office Visit) with Kev Light MD   Medication Sig Dispense Refill    albuterol (PROVENTIL/VENTOLIN HFA) 90 mcg/actuation inhaler Inhale 2 puffs into the lungs every 4 (four) hours as needed for Shortness of Breath. Rescue 17 g 3    apixaban (ELIQUIS) 5 mg Tab Take 1 tablet (5 mg total) by mouth 2 (two) times daily. 180 tablet 3    ascorbic acid, vitamin C, (VITAMIN C) 100 MG tablet Take 100 mg by mouth daily as needed.      atorvastatin (LIPITOR) 80 MG tablet Take 1 tablet (80 mg total) by mouth every evening. 90 tablet 3    b complex vitamins tablet Take 1 tablet by mouth once daily.      BD ALCOHOL SWABS PadM       BD ULTRA-FINE ROLAND PEN NEEDLES 32 gauge x 5/32" Ndle       blood sugar diagnostic Strp Check blood glucose 2 times a day. True metrix. E11.65 200 strip 3    blood-glucose meter kit Test glucose 2-3x/day. True metrix 1 each 0    clopidogreL (PLAVIX) 75 mg tablet TAKE 1 TABLET(75 MG) BY MOUTH EVERY DAY 90 tablet 3    coenzyme Q10 100 " mg capsule Take 100 mg by mouth once daily.      dapagliflozin (FARXIGA) 5 mg Tab tablet Take 1 tablet (5 mg total) by mouth once daily. 30 tablet 3    ergocalciferol (VITAMIN D2) 50,000 unit Cap TAKE ONE CAPSULE BY MOUTH WEEKLY 12 capsule 2    fenofibrate 160 MG Tab TAKE 1 TABLET EVERY MORNING 90 tablet 1    fluticasone propionate (FLONASE) 50 mcg/actuation nasal spray 2 sprays (100 mcg total) by Each Nostril route 2 (two) times daily. 18.2 mL 3    furosemide (LASIX) 20 MG tablet TAKE 1 TABLET TWICE DAILY 180 tablet 3    gabapentin (NEURONTIN) 100 MG capsule TAKE 2 CAPSULES EVERY MORNING AND TAKE 3 CAPSULES EVERY DAY WITH DINNER 450 capsule 12    glimepiride (AMARYL) 2 MG tablet TAKE 1 TABLET EVERY DAY BEFORE BREAKFAST 90 tablet 2    hydrALAZINE (APRESOLINE) 50 MG tablet Take 1 tablet (50 mg total) by mouth 2 (two) times a day. 180 tablet 3    insulin degludec (TRESIBA FLEXTOUCH U-100) 100 unit/mL (3 mL) insulin pen Inject 20 Units into the skin once daily. 30 mL 4    isosorbide mononitrate (IMDUR) 60 MG 24 hr tablet Take 1 tablet (60 mg total) by mouth once daily. 90 tablet 11    ketoconazole (NIZORAL) 2 % cream Apply topically once daily. 60 g 6    lancets Misc Check blood glucose 2x/day. True metrix. E11.65 200 each 3    magnesium 250 mg Tab Take 1 tablet by mouth once daily.       metFORMIN (GLUCOPHAGE-XR) 500 MG ER 24hr tablet Take 1 tablet with breakfast and 2 tablets with supper. 270 tablet 2    nitroGLYCERIN (NITROSTAT) 0.4 MG SL tablet Place 1 tablet (0.4 mg total) under the tongue every 5 (five) minutes as needed for Chest pain. 25 tablet 11    omega-3 acid ethyl esters (LOVAZA) 1 gram capsule Take 2 capsules (2 g total) by mouth 2 (two) times daily. 360 capsule 3    pantoprazole (PROTONIX) 40 MG tablet TAKE 1 TABLET EVERY DAY 90 tablet 3    semaglutide (OZEMPIC) 2 mg/dose (8 mg/3 mL) PnIj Inject 2 mg into the skin every 7 days. 4 each 3    semaglutide (OZEMPIC) 2 mg/dose (8 mg/3 mL) PnIj Inject 2 mg into  "the skin every 7 days. 1 each 0    tiZANidine (ZANAFLEX) 4 MG tablet Take 1 tablet (4 mg total) by mouth every 6 (six) hours as needed (pain). 360 tablet 3    triazolam (HALCION) 0.25 MG Tab TAKE 1 TABLET BY MOUTH EVERY NIGHT AT BEDTIME AS NEEDED 90 tablet 5    TRUEPLUS LANCETS 33 gauge Misc          Past Medical History:   Diagnosis Date    Chronic heart failure with preserved ejection fraction 2/9/2023    Chronic midline low back pain without sciatica 10/2/2017    Colon polyps     Coronary artery disease involving native coronary artery of native heart 3/5/2020    Coronary artery disease involving native coronary artery of native heart with angina pectoris 12/10/2020    Diabetes mellitus with neurological manifestations, uncontrolled 1/24/2017    Diabetic polyneuropathy associated with type 2 diabetes mellitus 1/24/2017    Diabetic polyneuropathy associated with type 2 diabetes mellitus     Essential hypertension 1/24/2017    Gastroesophageal reflux disease 1/24/2017    Hyperlipidemia 1/24/2017    Insomnia 1/24/2017    Long-term insulin use 1/24/2017    Longstanding persistent atrial fibrillation 12/30/2020    Lumbar spondylosis 11/13/2017    Nuclear sclerosis of both eyes 8/24/2017    Obesity     PAD (peripheral artery disease) 3/23/2022    Stage 3 chronic kidney disease 2/13/2019    Uncontrolled type 2 diabetes mellitus without complication, with long-term current use of insulin 1/24/2017           Vital Signs:  BP (!) 188/63   Pulse 67   Ht 5' 7" (1.702 m)   Wt 80.3 kg (177 lb 0.5 oz)   BMI 27.73 kg/m²      General: Awoke and orientedx3, in no acute distress  HEENT: Normocephalic, atruamatic, Moist mucous membranes  CV: Regular rate and rhythm, no JVD  Pulm: Normal inspiratory effort, no audible wheezing  Abdomen: nondistended abdomen   Extremities: No clubbing, cyanosis or edema  Psych: Normal affect. Good eye contact     Labs:   No results found for: "CRP", "CALPROTECTIN"    Lab Results   Component Value " Date    [id number] 34 08/11/2022     Lab Results   Component Value Date    WBC 7.09 06/24/2023    HGB 10.8 (L) 06/24/2023    HCT 37.0 (L) 06/24/2023    MCV 69 (L) 06/24/2023     06/24/2023     Lab Results   Component Value Date    CREATININE 1.4 06/24/2023    ALBUMIN 3.7 06/24/2023    BILITOT 0.4 06/24/2023    ALKPHOS 29 (L) 06/24/2023    AST 22 06/24/2023    ALT 15 06/24/2023       Assessment/Plan:  Driss Hernandez Jr. is a 72 y.o. male with HTN, HLD, IDDM, CAD s/p CABG '16 on plavix, Afib on eliquis  who was referred for evaluation and treatment of NICHOLE.    # Iron deficiency anemia, unspecified iron deficiency anemia type [D50.9]    No prior EGD,  but recent colonoscopy with a few polyps.  He previously had severe GERD for years, but at present on daily PPI has no pyrosis.  Between longstanding GERD, NICHOLE and abdominal pain I feel that next best step would be EGD to further evaluate.    -- EGD next available  -- Continue protonix daily; low threshold to optimize antacids based on EGD    RTC 3-6 mo    Thank you for involving us in the care of this patient.        Kev Light MD  Department of Gastroenterology & Hepatology

## 2023-09-01 ENCOUNTER — TELEPHONE (OUTPATIENT)
Dept: ENDOSCOPY | Facility: HOSPITAL | Age: 73
End: 2023-09-01
Payer: MEDICARE

## 2023-09-01 VITALS — HEIGHT: 67 IN | WEIGHT: 177 LBS | BODY MASS INDEX: 27.78 KG/M2

## 2023-09-01 DIAGNOSIS — D64.9 ANEMIA, UNSPECIFIED TYPE: ICD-10-CM

## 2023-09-01 DIAGNOSIS — R10.9 ABDOMINAL PAIN, UNSPECIFIED ABDOMINAL LOCATION: Primary | ICD-10-CM

## 2023-09-01 NOTE — TELEPHONE ENCOUNTER
Dear Dr Purdy-     Patient has a scheduled procedure EGD on 10/17/23 and is currently taking a blood thinner prescribed by your office. In order to ensure patient safety, we would like to confirm that the patient can place their blood thinner medication on hold for the procedure. Can he discontinue Plavix (clopidogrel) for a minimum of 5 days and Eliquis 2 days prior to the procedure?     Thank you for your prompt reply.     [facility] Endoscopy Scheduling

## 2023-09-01 NOTE — TELEPHONE ENCOUNTER
Spoke to patient to schedule procedure(s) Upper Endoscopy (EGD)       Physician to perform procedure(s) Dr. LENNOX Rangel   Date of Procedure (s) 10/17/23  Arrival Time 1:00 PM  Time of Procedure(s) 12:00 PM   Location of Procedure(s) [address]  Type of Rx Prep sent to patient: Other  Instructions provided to patient via [facility]    Patient was informed on the following information and verbalized understanding. Screening questionnaire reviewed with patient and complete. If procedure requires anesthesia, a responsible adult needs to be present to accompany the patient home, patient cannot drive after receiving anesthesia. Appointment details are tentative, especially check-in time. Patient will receive a prep-op call 4 days prior to confirm check-in time for procedure. If applicable the patient should contact their pharmacy to verify Rx for procedure prep is ready for pick-up. Patient was advised to call the scheduling department at [phone] if pharmacy states no Rx is available. Patient was advised to call the endoscopy scheduling department if any questions or concerns arise.      [facility]oscopy Scheduling Department

## 2023-09-01 NOTE — TELEPHONE ENCOUNTER
"----- Message from Yessi Benitez sent at 2023 12:51 PM CDT -----    ----- Message -----  From: Kev Light MD  Sent: 2023   9:54 AM CDT  To: [facility] Endoscop[facility] Patients    Procedure: EGD    Diagnosis: Abdominal pain / anemia    Procedure Timin-4 weeks    #If within 4 weeks selected, please christy as high priority#    #If greater than 12 weeks, please select "5-12 weeks" and delay sending until 2 months prior to requested date#     Provider: Any GI provider    Location: No Preference    Additional Scheduling Information: Blood thinners    Prep Specifications:N/A    Is the patient taking a GLP-1 Agonist:yes    Have you attached a patient to this message: yes       "

## 2023-09-05 ENCOUNTER — TELEPHONE (OUTPATIENT)
Dept: ENDOSCOPY | Facility: HOSPITAL | Age: 73
End: 2023-09-05
Payer: MEDICARE

## 2023-09-05 NOTE — TELEPHONE ENCOUNTER
Patient has been approved to hold Plavix (clopidogrel) for 5 days and Eliquis 2 days per Dr. Purdy.

## 2023-09-05 NOTE — TELEPHONE ENCOUNTER
----- Message from Orquidea Cavazos RN sent at 9/1/2023  2:22 PM CDT -----  Regarding: 10/17 BT  The patient is currently under an internal cardiologist Dr. Eze Purdy care and requires a blood thinner Eliquis (apixaban) for their upcoming scheduled Upper Endoscopy (EGD) on 10/17/23.     Additional clearances required:  internal   cardiologist Dr. Eze Purdy  blood thinner   Plavix (clopidogrel)

## 2023-09-19 ENCOUNTER — HOSPITAL ENCOUNTER (OUTPATIENT)
Dept: CARDIOLOGY | Facility: HOSPITAL | Age: 73
Discharge: HOME | End: 2023-09-19
Attending: INTERNAL MEDICINE
Payer: MEDICARE

## 2023-09-19 DIAGNOSIS — I65.23 ATHEROSCLEROSIS OF BOTH CAROTID ARTERIES: ICD-10-CM

## 2023-09-19 LAB
LEFT CBA DIAS: 11 CM/S
LEFT CBA SYS: 77 CM/S
LEFT CCA DIST DIAS: 10 CM/S
LEFT CCA DIST SYS: 59 CM/S
LEFT CCA MID DIAS: 11 CM/S
LEFT CCA MID SYS: 54 CM/S
LEFT CCA PROX DIAS: 8 CM/S
LEFT CCA PROX SYS: 60 CM/S
LEFT ECA DIAS: 5 CM/S
LEFT ECA SYS: 188 CM/S
LEFT ICA DIST DIAS: 29 CM/S
LEFT ICA DIST SYS: 127 CM/S
LEFT ICA MID DIAS: 25 CM/S
LEFT ICA MID SYS: 193 CM/S
LEFT ICA PROX DIAS: 32 CM/S
LEFT ICA PROX SYS: 150 CM/S
LEFT VERTEBRAL DIAS: 11 CM/S
LEFT VERTEBRAL SYS: 67 CM/S
OHS CV CAROTID RIGHT ICA EDV HIGHEST: 37
OHS CV CAROTID ULTRASOUND LEFT ICA/CCA RATIO: 3.27
OHS CV CAROTID ULTRASOUND RIGHT ICA/CCA RATIO: 2.14
OHS CV PV CAROTID LEFT HIGHEST CCA: 60
OHS CV PV CAROTID LEFT HIGHEST ICA: 193
OHS CV PV CAROTID RIGHT HIGHEST CCA: 70
OHS CV PV CAROTID RIGHT HIGHEST ICA: 150
OHS CV US CAROTID LEFT HIGHEST EDV: 32
RIGHT CBA DIAS: 17 CM/S
RIGHT CBA SYS: 106 CM/S
RIGHT CCA DIST DIAS: 11 CM/S
RIGHT CCA DIST SYS: 70 CM/S
RIGHT CCA MID DIAS: 10 CM/S
RIGHT CCA MID SYS: 65 CM/S
RIGHT CCA PROX DIAS: 9 CM/S
RIGHT CCA PROX SYS: 56 CM/S
RIGHT ECA DIAS: 3 CM/S
RIGHT ECA SYS: 154 CM/S
RIGHT ICA DIST DIAS: 20 CM/S
RIGHT ICA DIST SYS: 134 CM/S
RIGHT ICA MID DIAS: 15 CM/S
RIGHT ICA MID SYS: 144 CM/S
RIGHT ICA PROX DIAS: 37 CM/S
RIGHT ICA PROX SYS: 150 CM/S
RIGHT VERTEBRAL DIAS: 11 CM/S
RIGHT VERTEBRAL SYS: 51 CM/S

## 2023-09-19 PROCEDURE — 93880 EXTRACRANIAL BILAT STUDY: CPT

## 2023-09-19 PROCEDURE — 93880 CV US DOPPLER CAROTID (CUPID ONLY): ICD-10-PCS | Mod: 26,,, | Performed by: INTERNAL MEDICINE

## 2023-09-19 PROCEDURE — 93880 EXTRACRANIAL BILAT STUDY: CPT | Mod: 26,,, | Performed by: INTERNAL MEDICINE

## 2023-09-20 ENCOUNTER — OFFICE VISIT (OUTPATIENT)
Dept: CARDIOLOGY | Facility: CLINIC | Age: 73
End: 2023-09-20
Payer: MEDICARE

## 2023-09-20 VITALS
RESPIRATION RATE: 18 BRPM | DIASTOLIC BLOOD PRESSURE: 60 MMHG | HEART RATE: 49 BPM | BODY MASS INDEX: 27.56 KG/M2 | SYSTOLIC BLOOD PRESSURE: 118 MMHG | WEIGHT: 175.63 LBS | HEIGHT: 67 IN | OXYGEN SATURATION: 96 %

## 2023-09-20 DIAGNOSIS — Z95.1 HX OF CABG: ICD-10-CM

## 2023-09-20 DIAGNOSIS — I65.23 ATHEROSCLEROSIS OF BOTH CAROTID ARTERIES: ICD-10-CM

## 2023-09-20 DIAGNOSIS — E11.59 HYPERTENSION ASSOCIATED WITH DIABETES: ICD-10-CM

## 2023-09-20 DIAGNOSIS — E08.22 DIABETES MELLITUS DUE TO UNDERLYING CONDITION WITH STAGE 3A CHRONIC KIDNEY DISEASE, WITH LONG-TERM CURRENT USE OF INSULIN: ICD-10-CM

## 2023-09-20 DIAGNOSIS — E11.69 HYPERLIPIDEMIA ASSOCIATED WITH TYPE 2 DIABETES MELLITUS: ICD-10-CM

## 2023-09-20 DIAGNOSIS — Z98.61 HISTORY OF PERCUTANEOUS CORONARY INTERVENTION: ICD-10-CM

## 2023-09-20 DIAGNOSIS — Z79.01 LONG TERM (CURRENT) USE OF ANTICOAGULANTS: ICD-10-CM

## 2023-09-20 DIAGNOSIS — R94.31 ABNORMAL EKG: ICD-10-CM

## 2023-09-20 DIAGNOSIS — E78.5 HYPERLIPIDEMIA ASSOCIATED WITH TYPE 2 DIABETES MELLITUS: ICD-10-CM

## 2023-09-20 DIAGNOSIS — Z79.4 DIABETES MELLITUS DUE TO UNDERLYING CONDITION WITH STAGE 3A CHRONIC KIDNEY DISEASE, WITH LONG-TERM CURRENT USE OF INSULIN: ICD-10-CM

## 2023-09-20 DIAGNOSIS — I50.32 CHRONIC HEART FAILURE WITH PRESERVED EJECTION FRACTION: ICD-10-CM

## 2023-09-20 DIAGNOSIS — E78.2 MIXED HYPERLIPIDEMIA: ICD-10-CM

## 2023-09-20 DIAGNOSIS — N18.31 DIABETES MELLITUS DUE TO UNDERLYING CONDITION WITH STAGE 3A CHRONIC KIDNEY DISEASE, WITH LONG-TERM CURRENT USE OF INSULIN: ICD-10-CM

## 2023-09-20 DIAGNOSIS — I25.810 CORONARY ARTERY DISEASE INVOLVING CORONARY BYPASS GRAFT OF NATIVE HEART WITHOUT ANGINA PECTORIS: Primary | ICD-10-CM

## 2023-09-20 DIAGNOSIS — I70.0 AORTIC ATHEROSCLEROSIS: ICD-10-CM

## 2023-09-20 DIAGNOSIS — I48.11 LONGSTANDING PERSISTENT ATRIAL FIBRILLATION: ICD-10-CM

## 2023-09-20 DIAGNOSIS — I10 ESSENTIAL HYPERTENSION: ICD-10-CM

## 2023-09-20 DIAGNOSIS — N18.31 STAGE 3A CHRONIC KIDNEY DISEASE: ICD-10-CM

## 2023-09-20 DIAGNOSIS — I15.2 HYPERTENSION ASSOCIATED WITH DIABETES: ICD-10-CM

## 2023-09-20 PROCEDURE — 3008F PR BODY MASS INDEX (BMI) DOCUMENTED: ICD-10-PCS | Mod: CPTII,S$GLB,, | Performed by: INTERNAL MEDICINE

## 2023-09-20 PROCEDURE — 3044F PR MOST RECENT HEMOGLOBIN A1C LEVEL <7.0%: ICD-10-PCS | Mod: CPTII,S$GLB,, | Performed by: INTERNAL MEDICINE

## 2023-09-20 PROCEDURE — 3074F PR MOST RECENT SYSTOLIC BLOOD PRESSURE < 130 MM HG: ICD-10-PCS | Mod: CPTII,S$GLB,, | Performed by: INTERNAL MEDICINE

## 2023-09-20 PROCEDURE — 99999 PR PBB SHADOW E&M-EST. PATIENT-LVL V: ICD-10-PCS | Mod: PBBFAC,,, | Performed by: INTERNAL MEDICINE

## 2023-09-20 PROCEDURE — 3074F SYST BP LT 130 MM HG: CPT | Mod: CPTII,S$GLB,, | Performed by: INTERNAL MEDICINE

## 2023-09-20 PROCEDURE — 3044F HG A1C LEVEL LT 7.0%: CPT | Mod: CPTII,S$GLB,, | Performed by: INTERNAL MEDICINE

## 2023-09-20 PROCEDURE — 3288F PR FALLS RISK ASSESSMENT DOCUMENTED: ICD-10-PCS | Mod: CPTII,S$GLB,, | Performed by: INTERNAL MEDICINE

## 2023-09-20 PROCEDURE — 3288F FALL RISK ASSESSMENT DOCD: CPT | Mod: CPTII,S$GLB,, | Performed by: INTERNAL MEDICINE

## 2023-09-20 PROCEDURE — 3066F PR DOCUMENTATION OF TREATMENT FOR NEPHROPATHY: ICD-10-PCS | Mod: CPTII,S$GLB,, | Performed by: INTERNAL MEDICINE

## 2023-09-20 PROCEDURE — 3078F DIAST BP <80 MM HG: CPT | Mod: CPTII,S$GLB,, | Performed by: INTERNAL MEDICINE

## 2023-09-20 PROCEDURE — 3008F BODY MASS INDEX DOCD: CPT | Mod: CPTII,S$GLB,, | Performed by: INTERNAL MEDICINE

## 2023-09-20 PROCEDURE — 1101F PR PT FALLS ASSESS DOC 0-1 FALLS W/OUT INJ PAST YR: ICD-10-PCS | Mod: CPTII,S$GLB,, | Performed by: INTERNAL MEDICINE

## 2023-09-20 PROCEDURE — 99214 OFFICE O/P EST MOD 30 MIN: CPT | Mod: S$GLB,,, | Performed by: INTERNAL MEDICINE

## 2023-09-20 PROCEDURE — 93000 EKG 12-LEAD: ICD-10-PCS | Mod: S$GLB,,, | Performed by: INTERNAL MEDICINE

## 2023-09-20 PROCEDURE — 3061F PR NEG MICROALBUMINURIA RESULT DOCUMENTED/REVIEW: ICD-10-PCS | Mod: CPTII,S$GLB,, | Performed by: INTERNAL MEDICINE

## 2023-09-20 PROCEDURE — 99999 PR PBB SHADOW E&M-EST. PATIENT-LVL V: CPT | Mod: PBBFAC,,, | Performed by: INTERNAL MEDICINE

## 2023-09-20 PROCEDURE — 1160F RVW MEDS BY RX/DR IN RCRD: CPT | Mod: CPTII,S$GLB,, | Performed by: INTERNAL MEDICINE

## 2023-09-20 PROCEDURE — 1160F PR REVIEW ALL MEDS BY PRESCRIBER/CLIN PHARMACIST DOCUMENTED: ICD-10-PCS | Mod: CPTII,S$GLB,, | Performed by: INTERNAL MEDICINE

## 2023-09-20 PROCEDURE — 93000 ELECTROCARDIOGRAM COMPLETE: CPT | Mod: S$GLB,,, | Performed by: INTERNAL MEDICINE

## 2023-09-20 PROCEDURE — 3078F PR MOST RECENT DIASTOLIC BLOOD PRESSURE < 80 MM HG: ICD-10-PCS | Mod: CPTII,S$GLB,, | Performed by: INTERNAL MEDICINE

## 2023-09-20 PROCEDURE — 1159F MED LIST DOCD IN RCRD: CPT | Mod: CPTII,S$GLB,, | Performed by: INTERNAL MEDICINE

## 2023-09-20 PROCEDURE — 99214 PR OFFICE/OUTPT VISIT, EST, LEVL IV, 30-39 MIN: ICD-10-PCS | Mod: S$GLB,,, | Performed by: INTERNAL MEDICINE

## 2023-09-20 PROCEDURE — 1101F PT FALLS ASSESS-DOCD LE1/YR: CPT | Mod: CPTII,S$GLB,, | Performed by: INTERNAL MEDICINE

## 2023-09-20 PROCEDURE — 3061F NEG MICROALBUMINURIA REV: CPT | Mod: CPTII,S$GLB,, | Performed by: INTERNAL MEDICINE

## 2023-09-20 PROCEDURE — 3066F NEPHROPATHY DOC TX: CPT | Mod: CPTII,S$GLB,, | Performed by: INTERNAL MEDICINE

## 2023-09-20 PROCEDURE — 1159F PR MEDICATION LIST DOCUMENTED IN MEDICAL RECORD: ICD-10-PCS | Mod: CPTII,S$GLB,, | Performed by: INTERNAL MEDICINE

## 2023-09-20 PROCEDURE — 1126F AMNT PAIN NOTED NONE PRSNT: CPT | Mod: CPTII,S$GLB,, | Performed by: INTERNAL MEDICINE

## 2023-09-20 PROCEDURE — 1126F PR PAIN SEVERITY QUANTIFIED, NO PAIN PRESENT: ICD-10-PCS | Mod: CPTII,S$GLB,, | Performed by: INTERNAL MEDICINE

## 2023-09-20 NOTE — PROGRESS NOTES
CARDIOVASCULAR PROGRESS NOTE    REASON FOR CONSULT:   Driss Hernandez Jr. is a 72 y.o. male who presents for follow up of CAD, HFpEF, Chr AF.    PCP: Ehrensing  Ortho: [facility]  HISTORY OF PRESENT ILLNESS:   The patient returns for follow up.  He denies intercurrent angina, dyspnea, palpitations, or syncope.  There has been no PND, orthopnea, melena, hematuria, or claudication symptoms.  He continues take his Eliquis without any issues.    I reviewed the results of his carotid ultrasound as outlined below.    CARDIOVASCULAR HISTORY:   CAD 2016 CABG x3 (LIMA-LAD, SVG-D, SVG-PDA)   11/11/20 cath with diffuse native disease, patent SVG x2, LIMA-LAD atretic   12/10/20: PCI OM2 2.25x26 Resolute Glenn RICO    HFpEF    PAD    Chr AF on eliquis 5mg bid    Carotid atherosclerosis, bilat (US 9/2023)    PAST MEDICAL HISTORY:     Past Medical History:   Diagnosis Date    Chronic heart failure with preserved ejection fraction 2/9/2023    Chronic midline low back pain without sciatica 10/2/2017    Colon polyps     Coronary artery disease involving native coronary artery of native heart 3/5/2020    Coronary artery disease involving native coronary artery of native heart with angina pectoris 12/10/2020    Diabetes mellitus with neurological manifestations, uncontrolled 1/24/2017    Diabetic polyneuropathy associated with type 2 diabetes mellitus 1/24/2017    Diabetic polyneuropathy associated with type 2 diabetes mellitus     Essential hypertension 1/24/2017    Gastroesophageal reflux disease 1/24/2017    Hyperlipidemia 1/24/2017    Insomnia 1/24/2017    Long-term insulin use 1/24/2017    Longstanding persistent atrial fibrillation 12/30/2020    Lumbar spondylosis 11/13/2017    Nuclear sclerosis of both eyes 8/24/2017    Obesity     PAD (peripheral artery disease) 3/23/2022    Stage 3 chronic kidney disease 2/13/2019    Uncontrolled type 2 diabetes mellitus without complication, with long-term current use of insulin 1/24/2017        PAST SURGICAL HISTORY:     Past Surgical History:   Procedure Laterality Date    ARTHROSCOPIC REPAIR OF ROTATOR CUFF OF SHOULDER Right 7/5/2022    Procedure: REPAIR, ROTATOR CUFF, ARTHROSCOPIC;  Surgeon: Valerie Olivera MD;  Location: [facility] OR;  Service: Orthopedics;  Laterality: Right;  HETAL LEMUS [phone]/ TEXTED ALLAN ON 6/23/2022 @ 9:47AM. ALLAN RESPONDED ON 6/23/2022 @ 10:33AM-  JEAN PAUL BABIN [phone] MY BE HERE FOR CASE SPOKE TO HIM @ 9:59AM ON 7-1-2022  RN PREOP 6/28/2022    BACK SURGERY      2002    CATARACT EXTRACTION W/  INTRAOCULAR LENS IMPLANT Right 08/29/2017    Dr. Hong    CATARACT EXTRACTION W/  INTRAOCULAR LENS IMPLANT Left 02/24/2022    Dr. Hong    COLONOSCOPY N/A 2/21/2017    Procedure: COLONOSCOPY;  Surgeon: Robb Rosado MD;  Location: [facility] ENDO;  Service: Endoscopy;  Laterality: N/A;    COLONOSCOPY N/A 7/5/2023    Procedure: COLONOSCOPY;  Surgeon: Aston Varela MD;  Location: [facility] ENDO;  Service: Endoscopy;  Laterality: N/A;  Referral: JOVANNI SCANLON  ok to hold Plavix 5 days and Eliquis 2 days per Dr Ford   Meds  Suprep   Inst portal   LW    CORONARY ANGIOGRAPHY INCLUDING BYPASS GRAFTS WITH CATHETERIZATION OF LEFT HEART N/A 11/11/2020    Procedure: ANGIOGRAM, CORONARY, INCLUDING BYPASS GRAFT, WITH LEFT HEART CATHETERIZATION;  Surgeon: Lane Cuellar MD;  Location: [facility] CATH LAB;  Service: Cardiology;  Laterality: N/A;  RN PRE OP 11-5-2020. --COVID NEGATIVE ON  11-. C A    CORONARY ARTERY BYPASS GRAFT      March 2016    EPIDURAL STEROID INJECTION Bilateral 11/14/2018    Procedure: Lumbar Medial Branch Blocks;  Surgeon: Eze Byrd Jr., MD;  Location: [facility] ENDO;  Service: Pain Management;  Laterality: Bilateral;  Bilateral Lumbar Medial Branch Blocks L2, L3, L4, L5    [id number]  [id number]    Arrive @ 1150; NO Sedation    EPIDURAL STEROID INJECTION Bilateral 11/28/2018    Procedure: Injection, Steroid, Epidural;  Surgeon:  Eze Byrd Jr., MD;  Location: [facility] ENDO;  Service: Pain Management;  Laterality: Bilateral;  Bilateral Sacroiliac Joint Steroid Injections    [id number]    Arrive @ 0910    EPIDURAL STEROID INJECTION Bilateral 3/20/2019    Procedure: Injection, Steroid, Sacroiliiac Joint;  Surgeon: Eze Byrd Jr., MD;  Location: [facility] ENDO;  Service: Pain Management;  Laterality: Bilateral;  Bilateral Sacroiliac Joint Steroid Injections    [id number]    Arrive @ 1145; Trigger point injections also?    EPIDURAL STEROID INJECTION Right 8/2/2023    Procedure: Right L5 Transforaminal Epidural Steroid Injection;  Surgeon: Eze Byrd Jr., MD;  Location: [facility] ENDO;  Service: Pain Management;  Laterality: Right;  @0830 (given)  Eliquis last 7/29  Plavix last 7/27  Check BG    INTRAOCULAR PROSTHESES INSERTION Left 2/24/2022    Procedure: INSERTION, IOL PROSTHESIS;  Surgeon: Nickolas Hong MD;  Location: [facility] OR;  Service: Ophthalmology;  Laterality: Left;    PHACOEMULSIFICATION OF CATARACT Left 2/24/2022    Procedure: PHACOEMULSIFICATION, CATARACT;  Surgeon: Nickolas Hong MD;  Location: [facility] OR;  Service: Ophthalmology;  Laterality: Left;  RN Phone Pre Op 2-16-22.  Covid NEGATIVE  2-23-22.  Arrival 08:00 am.    TONSILLECTOMY         ALLERGIES AND MEDICATION:     Review of patient's allergies indicates:   Allergen Reactions    Penicillins Other (See Comments)     Unknown reaction, Had a reaction as a child    Shrimp Itching     Hand itching        Medication List            Accurate as of September 20, 2023  8:57 AM. If you have any questions, ask your nurse or doctor.                CHANGE how you take these medications      pantoprazole 40 MG tablet  Commonly known as: PROTONIX  TAKE 1 TABLET EVERY DAY  What changed: Another medication with the same name was removed. Continue taking this medication, and follow the directions you see here.  Changed by: Eze Purdy MD            CONTINUE taking these  "medications      albuterol 90 mcg/actuation inhaler  Commonly known as: PROVENTIL/VENTOLIN HFA  Inhale 2 puffs into the lungs every 4 (four) hours as needed for Shortness of Breath. Rescue     apixaban 5 mg Tab  Commonly known as: ELIQUIS  Take 1 tablet (5 mg total) by mouth 2 (two) times daily.     ascorbic acid (vitamin C) 100 MG tablet  Commonly known as: VITAMIN C     atorvastatin 80 MG tablet  Commonly known as: LIPITOR  Take 1 tablet (80 mg total) by mouth every evening.     azelastine 137 mcg (0.1 %) nasal spray  Commonly known as: ASTELIN  1 spray (137 mcg total) by Nasal route 2 (two) times daily.     b complex vitamins tablet     BD ALCOHOL SWABS Padm  Generic drug: alcohol swabs     BD ULTRA-FINE ROLAND PEN NEEDLE 32 gauge x 5/32" Ndle  Generic drug: pen needle, diabetic     blood sugar diagnostic Strp  Check blood glucose 2 times a day. True metrix. E11.65     blood-glucose meter kit  Test glucose 2-3x/day. True metrix     celecoxib 100 MG capsule  Commonly known as: CeleBREX  TAKE 1 CAPSULE(100 MG) BY MOUTH EVERY DAY     clopidogreL 75 mg tablet  Commonly known as: PLAVIX  TAKE 1 TABLET(75 MG) BY MOUTH EVERY DAY     coenzyme Q10 100 mg capsule     dapagliflozin propanediol 5 mg Tab tablet  Commonly known as: Farxiga  Take 1 tablet (5 mg total) by mouth once daily.     ergocalciferol 50,000 unit Cap  Commonly known as: VITAMIN D2  TAKE ONE CAPSULE BY MOUTH WEEKLY     fenofibrate 160 MG Tab  TAKE 1 TABLET EVERY MORNING     fluticasone propionate 50 mcg/actuation nasal spray  Commonly known as: FLONASE  2 sprays (100 mcg total) by Each Nostril route 2 (two) times daily.     furosemide 20 MG tablet  Commonly known as: LASIX  TAKE 1 TABLET TWICE DAILY     gabapentin 100 MG capsule  Commonly known as: NEURONTIN  TAKE 2 CAPSULES EVERY MORNING AND TAKE 3 CAPSULES EVERY DAY WITH DINNER     glimepiride 2 MG tablet  Commonly known as: AMARYL  TAKE 1 TABLET EVERY DAY BEFORE BREAKFAST     hydrALAZINE 50 MG " tablet  Commonly known as: APRESOLINE  Take 1 tablet (50 mg total) by mouth 2 (two) times a day.     insulin degludec 100 unit/mL (3 mL) insulin pen  Commonly known as: TRESIBA FLEXTOUCH U-100  Inject 20 Units into the skin once daily.     isosorbide mononitrate 60 MG 24 hr tablet  Commonly known as: IMDUR  Take 1 tablet (60 mg total) by mouth once daily.     ketoconazole 2 % cream  Commonly known as: NIZORAL  Apply topically once daily.     * lancets Misc  Check blood glucose 2x/day. True metrix. E11.65     * TRUEPLUS LANCETS 33 gauge Misc  Generic drug: lancets     magnesium 250 mg Tab     metFORMIN 500 MG ER 24hr tablet  Commonly known as: GLUCOPHAGE-XR  Take 1 tablet with breakfast and 2 tablets with supper.     nitroGLYCERIN 0.4 MG SL tablet  Commonly known as: NITROSTAT  Place 1 tablet (0.4 mg total) under the tongue every 5 (five) minutes as needed for Chest pain.     omega-3 acid ethyl esters 1 gram capsule  Commonly known as: LOVAZA  Take 2 capsules (2 g total) by mouth 2 (two) times daily.     * OZEMPIC 2 mg/dose (8 mg/3 mL) Pnij  Generic drug: semaglutide  Inject 2 mg into the skin every 7 days.     * semaglutide 2 mg/dose (8 mg/3 mL) Pnij  Commonly known as: OZEMPIC  Inject 2 mg into the skin every 7 days.     tiZANidine 4 MG tablet  Commonly known as: ZANAFLEX  Take 1 tablet (4 mg total) by mouth every 6 (six) hours as needed (pain).     triazolam 0.25 MG Tab  Commonly known as: HALCION  TAKE 1 TABLET BY MOUTH EVERY NIGHT AT BEDTIME AS NEEDED           * This list has 4 medication(s) that are the same as other medications prescribed for you. Read the directions carefully, and ask your doctor or other care provider to review them with you.                STOP taking these medications      HYDROcodone-acetaminophen 5-325 mg per tablet  Commonly known as: NORCO  Stopped by: Eze Purdy MD     metoclopramide HCl 10 MG tablet  Commonly known as: REGLAN  Stopped by: Eze Purdy MD      ondansetron 4 MG Tbdl  Commonly known as: ZOFRAN-ODT  Stopped by: Eze Purdy MD              SOCIAL HISTORY:     Social History     Socioeconomic History    Marital status:    Tobacco Use    Smoking status: Never     Passive exposure: Never    Smokeless tobacco: Never   Substance and Sexual Activity    Alcohol use: Yes     Comment: rare occassion    Drug use: No    Sexual activity: Yes     Partners: Female     Social Determinants of Health     Financial Resource Strain: Low Risk  (9/17/2023)    Overall Financial Resource Strain (CARDIA)     Difficulty of Paying Living Expenses: Not hard at all   Food Insecurity: No Food Insecurity (9/17/2023)    Hunger Vital Sign     Worried About Running Out of Food in the Last Year: Never true     Ran Out of Food in the Last Year: Never true   Transportation Needs: No Transportation Needs (9/17/2023)    PRAPARE - Transportation     Lack of Transportation (Medical): No     Lack of Transportation (Non-Medical): No   Physical Activity: Insufficiently Active (9/17/2023)    Exercise Vital Sign     Days of Exercise per Week: 3 days     Minutes of Exercise per Session: 40 min   Stress: No Stress Concern Present (9/17/2023)    [employer] [place] [employer] - Occupational Stress Questionnaire     Feeling of Stress : Only a little   Social Connections: Moderately Integrated (9/17/2023)    Social Connection and Isolation Panel [NHANES]     Frequency of Communication with Friends and Family: More than three times a week     Frequency of Social Gatherings with Friends and Family: Twice a week     Attends Alevism Services: Never     Active Member of Clubs or Organizations: Yes     Attends Club or Organization Meetings: More than 4 times per year     Marital Status:    Housing Stability: Low Risk  (9/17/2023)    Housing Stability Vital Sign     Unable to Pay for Housing in the Last Year: No     Number of Places Lived in the Last Year: 1     Unstable Housing  "in the Last Year: No       FAMILY HISTORY:     Family History   Problem Relation Age of Onset    Depression Mother     Heart disease Mother     Stroke Father     No Known Problems Sister     Diabetes Sister     No Known Problems Sister     Blindness Brother     No Known Problems Maternal Aunt     No Known Problems Maternal Uncle     No Known Problems Paternal Aunt     No Known Problems Paternal Uncle     No Known Problems Maternal Grandmother     No Known Problems Maternal Grandfather     No Known Problems Paternal Grandmother     No Known Problems Paternal Grandfather     Amblyopia Neg Hx     Cancer Neg Hx     Cataracts Neg Hx     Glaucoma Neg Hx     Hypertension Neg Hx     Macular degeneration Neg Hx     Retinal detachment Neg Hx     Strabismus Neg Hx     Thyroid disease Neg Hx        REVIEW OF SYSTEMS:   Review of Systems   Constitutional:  Negative for chills, diaphoresis and fever.   HENT:  Negative for nosebleeds.    Eyes:  Negative for blurred vision, double vision and photophobia.   Respiratory:  Negative for cough, hemoptysis, shortness of breath and wheezing.    Cardiovascular:  Negative for chest pain, palpitations, orthopnea, claudication, leg swelling and PND.   Gastrointestinal:  Negative for abdominal pain, blood in stool, heartburn, melena, nausea and vomiting.   Genitourinary:  Negative for flank pain and hematuria.   Musculoskeletal:  Negative for falls, joint pain, myalgias and neck pain.   Skin:  Negative for rash.   Neurological:  Negative for dizziness, seizures, loss of consciousness, weakness and headaches.   Endo/Heme/Allergies:  Negative for polydipsia. Does not bruise/bleed easily.   Psychiatric/Behavioral:  Negative for depression and memory loss. The patient is not nervous/anxious.        PHYSICAL EXAM:     Vitals:    09/20/23 0846   BP: 118/60   Pulse: (!) 49   Resp: 18    Body mass index is 27.5 kg/m².  Weight: 79.6 kg (175 lb 9.5 oz)   Height: 5' 7" (170.2 cm)     Physical " Exam  Vitals reviewed.   Constitutional:       General: He is not in acute distress.     Appearance: Normal appearance. He is well-developed. He is not ill-appearing, toxic-appearing or diaphoretic.   HENT:      Head: Normocephalic and atraumatic.   Eyes:      General: No scleral icterus.     Extraocular Movements: Extraocular movements intact.      Conjunctiva/sclera: Conjunctivae normal.      Pupils: Pupils are equal, round, and reactive to light.   Neck:      Thyroid: No thyromegaly.      Vascular: Normal carotid pulses. No carotid bruit or JVD.      Trachea: Trachea normal.   Cardiovascular:      Rate and Rhythm: Bradycardia present. Rhythm irregularly irregular.      Heart sounds: S1 normal and S2 normal. Heart sounds are distant. No murmur heard.     No friction rub. No gallop.   Pulmonary:      Effort: Pulmonary effort is normal. No respiratory distress.      Breath sounds: Normal breath sounds. No stridor. No wheezing, rhonchi or rales.   Chest:      Chest wall: No tenderness.   Abdominal:      General: There is no distension.      Palpations: Abdomen is soft.   Musculoskeletal:         General: No swelling or tenderness.      Cervical back: Normal range of motion and neck supple. No edema or rigidity.      Right lower leg: No edema.      Left lower leg: No edema.      Comments: R shoulder limited ROM   Feet:      Right foot:      Skin integrity: No ulcer.      Left foot:      Skin integrity: No ulcer.   Skin:     General: Skin is warm and dry.      Coloration: Skin is not jaundiced.   Neurological:      General: No focal deficit present.      Mental Status: He is alert and oriented to person, place, and time.      Cranial Nerves: No cranial nerve deficit.   Psychiatric:         Mood and Affect: Mood normal.         Speech: Speech normal.         Behavior: Behavior normal. Behavior is cooperative.         DATA:   EKG: (personally reviewed tracing(s))  9/20/23 AF 54    Laboratory:  CBC:  Recent Labs   Lab  06/09/22  0826 06/16/23  0953 06/24/23 2118   WBC 5.56 5.42 7.09   Hemoglobin 10.8 L 11.7 L 10.8 L   Hematocrit 36.7 L 41.4 37.0 L   Platelets 319 308 245         CHEMISTRIES:  Recent Labs   Lab 04/28/22  0923 05/11/22  0722 06/09/22  0826 08/11/22  0931 11/11/22  0729 05/22/23  0906 06/16/23  0953 06/24/23 2118   Glucose 146 H  --  126 H   < > 196 H  --  112 H 81   Sodium 141  --  143   < > 139  --  140 140   Potassium 4.9  --  4.9   < > 4.9  --  4.6 4.2   BUN 30 H  --  33 H   < > 25 H  --  27 H 25 H   Creatinine 1.5 H 1.5 H 1.5 H   < > 1.4 1.5 H 1.5 H 1.4   eGFR if  53 A 53.0 A 53.0 A  --   --   --   --   --    eGFR if non  46 A 45.9 A 45.9 A  --   --   --   --   --    Calcium 9.7  --  10.2   < > 9.5  --  10.3 9.4    < > = values in this interval not displayed.         CARDIAC BIOMARKERS:  Recent Labs   Lab 06/24/23 2118 06/24/23  2329   Troponin I 0.050 H 0.054 H         COAGS:  Recent Labs   Lab 04/21/21  0921 05/10/21  0945 06/24/23  2217   INR 2.0 H 2.9 H 1.1         LIPIDS/LFTS:  Recent Labs   Lab 05/11/22 0722 06/09/22  0826 09/13/22  0658 06/16/23  0953 06/24/23 2118   Cholesterol 123  --  133 141  --    Triglycerides 116  --  144 104  --    HDL 22 L  --  25 L 28 L  --    LDL Cholesterol 77.8  --  79.2 92.2  --    Non-HDL Cholesterol 101  --  108 113  --    AST  --    < > 18 18 22   ALT  --    < > 12 13 15    < > = values in this interval not displayed.         Cardiovascular Testing:  Carotid US 9/20/23    There is 40-49% right Internal Carotid Stenosis.    There is 40-49% left Internal Carotid Stenosis.    >50% bilat ECA stenosis.    When compared with report dated 3/13/23, L ICA stenosis is now less severe (previously 70-79%).    Holter 3/13/23  Atrial fibrillation  Heart rates varied between 32 and 99 BPM with an average of 58 BPM.  There were frequent PVCs.  Diary entries correlate with heart rates between 45-61bpm.    Ex MPI 3/13/23 (images personally reviewed and  interpreted)    The patient exercised for 8 minutes 16 seconds on a Marcial protocol, corresponding to a functional capacity of 9 METS, achieving a peak heart rate of 127 bpm, which is 86 % of the age predicted maximum heart rate. Their exercise capacity was above average.    Normal myocardial perfusion scan. There is no evidence of myocardial ischemia or infarction.    There is a mild to moderate intensity perfusion abnormality in the anteroseptal wall of the left ventricle consistent with the RV insertion site.    The gated perfusion images showed an ejection fraction of 66% post stress.    There is normal wall motion post stress.    The ECG portion of the study is negative for ischemia.    The patient reported no chest pain during the stress test.    During stress, rare PVCs are noted.    The exercise capacity was above average.    LE venous US 4/20/22  There is no evidence of a right lower extremity DVT.  There is no evidence of a left lower extremity DVT.  1.2x0.6cm non vascular structure noted in left proximal calf.    Echo 3/30/22  The left ventricle is normal in size with mild concentric hypertrophy and normal systolic function.  The estimated ejection fraction is 60%.  Mild right ventricular enlargement with low normal right ventricular systolic function.  Mild mitral regurgitation.  Mild tricuspid regurgitation.  The estimated PA systolic pressure is 22 mmHg.  Atrial fibrillation observed.    PCI OM1 12/10/20  1.  90% mid OM2 stenosis.  2.  Successful PCI with placement of a 2.25 x 26 mm drug-eluting stent reducing the 90% stenosis to 0%.  INGRIS 3 flow.  No dissection.  Good angiographic results.    Cath 11/11/20   Left Main   The vessel is angiographically normal.   Left Anterior Descending   Subtotal mid occlusion. Diffusely diseased distal vessel   Left Circumflex   Long mid 80% between OM1 and OM2   First Obtuse Marginal Branch   90% mid   Second Obtuse Marginal Branch   80% mid   Right Coronary Artery    80% mid - moderate disffuse distal disease   LIMA Graft To Mid LAD   Mid to distal graft diffusely diseased 0 multile 80-90% lesions. Small diffusely diseased distal LAD   Saphenous Graft To RPDA   Patent with good runoff to PDA   Graft To 1st Diag   Patent to D1 with good runoff     Ex NUSRAT 4/18/18  Resting NUSRAT:   The right ankle brachial index was 1.04 which is normal.   The left ankle brachial index was 1.08 which is normal.   The right TBI is 0.55.   The left TBI is 0.98.   Exercise NUSRAT:   Post exercise right ankle pressure was 113 mmHg, resulting in a right post-exercise NUSRAT of 0.67.   Post exercise left ankle pressure was 150 mmHg, resulting in a left post-exercise NUSRAT of 0.89.       ASSESSMENT:   # CAD s/p CABG/PCI OM 12/2020, asymtomatic.  MPI 3/2023 neg.  # HTN, controlled  # HFpEF, euvolemic  # CKD3a, elev creat/K+ with losartan, normalized when stopped losartan.  # Chr AF on eliquis 5mg bid, HR controlled (?too slow).  Holter 3/2023 OK, no evidence of chronotropic incompetence on Ex MPI 3/2023.  # HLP on atorva 80mg  # PAD, abnl NUSRAT 4/18/18  # carotid atherosclerosis (CUS 9/2023)  # L>R LE edema, resolved.  LE venous US 4/2022 neg.  # aortic atherosclerosis (CT Chest 10/29/18)    PLAN:   Cont med rx  Cont Plavix 75mg qd  Cont eliquis 5mg bid  RTC 6 months with surveillance Carotid US (Mar 2024)    Eze Purdy MD, FACC

## 2023-09-21 ENCOUNTER — OFFICE VISIT (OUTPATIENT)
Dept: PAIN MEDICINE | Facility: CLINIC | Age: 73
End: 2023-09-21
Payer: MEDICARE

## 2023-09-21 VITALS
SYSTOLIC BLOOD PRESSURE: 161 MMHG | OXYGEN SATURATION: 98 % | RESPIRATION RATE: 18 BRPM | HEART RATE: 61 BPM | DIASTOLIC BLOOD PRESSURE: 73 MMHG

## 2023-09-21 DIAGNOSIS — M51.36 DDD (DEGENERATIVE DISC DISEASE), LUMBAR: ICD-10-CM

## 2023-09-21 DIAGNOSIS — M54.16 LUMBAR RADICULOPATHY: Primary | ICD-10-CM

## 2023-09-21 DIAGNOSIS — M96.1 POSTLAMINECTOMY SYNDROME OF LUMBAR REGION: ICD-10-CM

## 2023-09-21 DIAGNOSIS — M47.816 LUMBAR SPONDYLOSIS: ICD-10-CM

## 2023-09-21 PROCEDURE — 3078F PR MOST RECENT DIASTOLIC BLOOD PRESSURE < 80 MM HG: ICD-10-PCS | Mod: CPTII,S$GLB,,

## 2023-09-21 PROCEDURE — 1160F PR REVIEW ALL MEDS BY PRESCRIBER/CLIN PHARMACIST DOCUMENTED: ICD-10-PCS | Mod: CPTII,S$GLB,,

## 2023-09-21 PROCEDURE — 3288F FALL RISK ASSESSMENT DOCD: CPT | Mod: CPTII,S$GLB,,

## 2023-09-21 PROCEDURE — 3288F PR FALLS RISK ASSESSMENT DOCUMENTED: ICD-10-PCS | Mod: CPTII,S$GLB,,

## 2023-09-21 PROCEDURE — 3061F PR NEG MICROALBUMINURIA RESULT DOCUMENTED/REVIEW: ICD-10-PCS | Mod: CPTII,S$GLB,,

## 2023-09-21 PROCEDURE — 3044F PR MOST RECENT HEMOGLOBIN A1C LEVEL <7.0%: ICD-10-PCS | Mod: CPTII,S$GLB,,

## 2023-09-21 PROCEDURE — 99214 OFFICE O/P EST MOD 30 MIN: CPT | Mod: S$GLB,,,

## 2023-09-21 PROCEDURE — 3078F DIAST BP <80 MM HG: CPT | Mod: CPTII,S$GLB,,

## 2023-09-21 PROCEDURE — 99999 PR PBB SHADOW E&M-EST. PATIENT-LVL V: CPT | Mod: PBBFAC,,,

## 2023-09-21 PROCEDURE — 1101F PR PT FALLS ASSESS DOC 0-1 FALLS W/OUT INJ PAST YR: ICD-10-PCS | Mod: CPTII,S$GLB,,

## 2023-09-21 PROCEDURE — 1160F RVW MEDS BY RX/DR IN RCRD: CPT | Mod: CPTII,S$GLB,,

## 2023-09-21 PROCEDURE — 1125F AMNT PAIN NOTED PAIN PRSNT: CPT | Mod: CPTII,S$GLB,,

## 2023-09-21 PROCEDURE — 3061F NEG MICROALBUMINURIA REV: CPT | Mod: CPTII,S$GLB,,

## 2023-09-21 PROCEDURE — 99999 PR PBB SHADOW E&M-EST. PATIENT-LVL V: ICD-10-PCS | Mod: PBBFAC,,,

## 2023-09-21 PROCEDURE — 1101F PT FALLS ASSESS-DOCD LE1/YR: CPT | Mod: CPTII,S$GLB,,

## 2023-09-21 PROCEDURE — 3077F PR MOST RECENT SYSTOLIC BLOOD PRESSURE >= 140 MM HG: ICD-10-PCS | Mod: CPTII,S$GLB,,

## 2023-09-21 PROCEDURE — 1159F PR MEDICATION LIST DOCUMENTED IN MEDICAL RECORD: ICD-10-PCS | Mod: CPTII,S$GLB,,

## 2023-09-21 PROCEDURE — 3066F PR DOCUMENTATION OF TREATMENT FOR NEPHROPATHY: ICD-10-PCS | Mod: CPTII,S$GLB,,

## 2023-09-21 PROCEDURE — 3044F HG A1C LEVEL LT 7.0%: CPT | Mod: CPTII,S$GLB,,

## 2023-09-21 PROCEDURE — 3066F NEPHROPATHY DOC TX: CPT | Mod: CPTII,S$GLB,,

## 2023-09-21 PROCEDURE — 1125F PR PAIN SEVERITY QUANTIFIED, PAIN PRESENT: ICD-10-PCS | Mod: CPTII,S$GLB,,

## 2023-09-21 PROCEDURE — 99214 PR OFFICE/OUTPT VISIT, EST, LEVL IV, 30-39 MIN: ICD-10-PCS | Mod: S$GLB,,,

## 2023-09-21 PROCEDURE — 3077F SYST BP >= 140 MM HG: CPT | Mod: CPTII,S$GLB,,

## 2023-09-21 PROCEDURE — 1159F MED LIST DOCD IN RCRD: CPT | Mod: CPTII,S$GLB,,

## 2023-09-21 NOTE — H&P (VIEW-ONLY)
Subjective:     Patient ID: Driss Hernandez Jr. is a 72 y.o. male    Chief Complaint: Follow-up      Referred by: No ref. provider found      HPI:    Interval History PA (09/21/2023):  Patient returns to clinic for follow up.  Patient notes some worsening of his chronic right-sided lower back and extremity pain.  Notes previous right L5 TF ANNIE done on August did provide some initial relief although short-lived.  States pain was manageable for a few weeks however has since been worsening.  Continues to endorse pain located in his right lumbar paraspinal region.  Majority of his pain is located from his right lateral hip radiating into his right anterior thigh, down to his knee.  Denies radiation distal to this point.  Denies any numbness, tingling, weakness, bowel bladder dysfunction.  Notes difficulty sleeping due to increased pain, worsened when lying supine.  Patient interested in proceeding with the previously discussed procedure.  States that he is not overtly interested in exploring surgical options at this time.    Interval History PA (08/17/2023):  Patient returns to clinic for follow up.  Patient is s/p right L5 transforaminal epidural steroid injection done on 08/02/2023 reporting some initial relief with the 1st few days following the procedure.  Overall continued 30% relief.  Denies any changes in the quality or location of his pain.  States that he has been having minimal back pain, only notices a pressure sensation in his lower lumbar spine.  The majority of his pain is located between his right lateral hip into his anterior thigh stopping at the knee.  States his pain is intermittent, typically only present when getting out of bed, or standing up from a chair.  Notes minimal to no pain with activity or rest.  States symptoms still bothersome but overall tolerable at this time.  Denies any numbness, tingling, weakness, bowel or bladder dysfunction.    Interval History (7/18/23):  He returns today  for follow up and MRI review.  He reports that his pain is unchanged in quality and location since last encounter.  He denies any new or worsening symptoms.      Interval History (7/14/23):  He returns today for follow up.  He reports that he has been having worsening low back and lower extremity pain.  States he continues to have bilateral low back pain as before though it has worsened since last encounter.  Also states that he is now having right-sided hip and thigh pain associated with his back pain.  These symptoms have been present for roughly 6 months.  This is typically present when sitting for prolonged periods of time.  States when he gets up he states that his thigh feels like it is being twisted.  The pain extends in a distribution consistent with the L3 dermatome.  Denies any associated paresthesias.  Does feel that the right lower extremity is weak when getting up after sitting though strength will return to normal after a few minutes.  Denies any associated bowel or bladder dysfunction.      Interval History NP (5/17/2022):  Mr Hernandez presents for delayed FU. Returning lower back pain where TPIs have done well in past and requesting repeat. He will be having shoulder surgery upcoming. Denies new areas of pain or neurological changes. No focal voicing of  myelopathic symptoms such as handwriting changes or difficulty with buttons/coins/keys. Denies perineal paresthesias, bowel/bladder dysfunction. Currently taking neurontin and zanaflex being taken minimally at this time.       Interval History (9/22/21):  He returns today for follow up.  He reports that TPIs done at last visit have been helpful. His low back is doing well and does not need any further attention at this time. He does continue to have intermittent severe right shoulder pain. He denies any changes in the quality or location of this pain since last encounter. He denies any new or worsened symptoms.       Interval History (8/19/21):  He  returns today for follow up.  He reports that physical therapy has been helpful for the right neck and shoulder pain.  He states that his neck pain was never very severe and has now essentially resolved.  He states that the vast majority of his pain is located right anterior shoulder.  The pain is exacerbated with certain movements and is particularly bothersome while sleeping.  He denies any associated numbness tingling with right shoulder pain.  Patient also states that he has recurrent low back pain.  Similar in quality and location to previous back pain that was well treated with trigger point injections.  He would like repeat trigger point injections done today      Interval History (6/4/21):  He returns today for follow up.  He reports that he has had right-sided neck and upper extremity pain for the past 2 weeks.  He states the pain started after handing his wife a heavy flower pot.  The pain is located the right lower cervical paraspinal region radiate to the right wrist with associated numbness and tingling in the fingers of the right hand.  He denies any focal weakness or bowel bladder dysfunction.  The pain is constant worse with activity.  The pain disrupts his sleep.       Interval History (1/6/20):  He returns today for follow up.  He reports that lumbar trigger point injections have been helpful for the bilateral low back pain. He reports greater than 85% relief for over 9 months.  He states the pain has returned.  He denies any changes in the quality or location was pain. He would like repeat trigger point injections today.      Interval History (3/18/19):  He returns today for follow up.  He reports that trigger point injections have been helpful for the right-sided low back pain. He reports near complete resolution of the sharp right-sided low back pain. He does report that his previous low back pain starting to return.  He feels as though his current pain is exact same pain that was previously  helped by sacroiliac joint injections.  He is interested in repeat injections if appropriate.      Interval History (2/12/19):  He returns today for follow up and imaging review.  He reports that his pain is unchanged since last encounter.  He denies any new or worsening neurologic symptoms.      Interval History (2/7/19):  He returns today for follow up.  He reports that he has been having acute right-sided low back pain for about 1 week.  He denies any specific inciting event or injury.  The pain is located in the lateral aspect of the right lumbosacral region.  It does not radiate.  He denies any associated numbness, tingling, weakness, bowel bladder dysfunction.  The pain varies in intensity from day-to-day.  The pain is much worse with coughing and sneezing and sitting with a forward flexed posture.  He is still able to go to the gym at times.      Interval History (12/17/18):  He returns today for follow up.  He reports that bilateral SIJ injections has been helpful for the low back pain. He reports about 80% relief. He does not feel that he needs any further interventions at this time      Interval History (11/19/18):  He returns today for follow up.  He reports that bilateral lumbar medial branch blocks were not helpful. Pain is unchanged in quality and location since last visit. He denies any new symptoms.       Interval History (10/16/18):  He returns today for follow up.  He reports that he has continued to have bilateral low back pain. He still has some right lower extremity pain, but this is not as bothersome as his low back pain. The pain is located across the lower lumbar region R>L. It does not radiate. It is not associated with any numbness, tingling, weakness or b/b dysfunction. The pain is worse with activity. He also requests refills of tizanidine.      Interval History (12/18/17):  He returns today for follow up.  He reports that right L4 TFESI has been helpful for the right lumbar radicular  pain. He reports 100% relief. He still has some lower back pain, but would prefer to discuss this later. Otherwise he is doing well.       Interval History (11/13/17):  He returns today for follow up.  He reports that PT has been helpful for the low back pain. He still has significant right foot and ankle pain when sitting in a reclined postion. This is the most painful area. He also has low back pain that is constant but does not bother him as much. Otherwise he is doing well.       Driss Hernandez Jr. is a 72 y.o. male who presents today with chronic bilateral low back pain R>>>L. Pain started over 40 years ago. No inciting injury or event noted. Pain is located mainly on the right side of the lower lumbar paraspinals. About 5 weeks ago, patient began to have right lwoer extremity pain that he felt was related to his back pain, but this has subsided. He denies any numbness, tingling, weakness, or b/b dysfunction. The pain is described as dull, but can become sharp at times. Patient states that his pain is worse in the morning. He does not sleep well. States that he wakes up every 45 minutes. Has taken Tizanidine PRN in the pat, but cannot recall how it affected him. Probably has not taken in over a year. Cannot take NSAIDs due to decreased renal function. Has taken in the past with mild relief.  This pain is described in detail below.    Physical Therapy:  Yes.    Non-pharmacologic Treatment: Nothing really helps         TENS? No    Pain Medications:         Currently taking: Gabapentin, Tizandine. Tylenol p.r.n., Celebrex    Has tried in the past:  NSAIDs    Has not tried: Opioids, Tylenol, TCAs, SNRIs, topical creams    Blood thinners:  Plavix, Eliquis    Interventional Therapies:   11/29/17 - Right L4 TFESI - 100% relief  10/28/18 - bilateral SIJ injections -  80% relief  11/14/18 - Bilateral L2, L3, L4 and L5 MBBs - No relief noted  3/20/19 - bilateral sacroiliac joint steroid injections  08/02/2023 -  right L5 transforaminal epidural steroid injection - 30% relief    Relevant Surgeries: Previous right L4 hemilaminectomy    Affecting sleep? Yes    Affecting daily activities? yes    Depressive symptoms? no          SI/HI? No    Work status: Retired    Pain Scores:    Best:       2/10  Worst:     10/10  Usually:   7/10  Today:    4/10    Pain Disability Index  Family/Home Responsibilities:: 4  Recreation:: 4  Social Activity:: 4  Occupation:: 5  Sexual Behavior:: 4  Self Care:: 6  Life-Support Activities:: 10  Pain Disability Index (PDI): 37(     Review of Systems   Constitutional:  Negative for activity change, appetite change, chills, fatigue, fever and unexpected weight change.   HENT:  Negative for hearing loss.    Eyes:  Negative for visual disturbance.   Respiratory:  Negative for chest tightness and shortness of breath.    Cardiovascular:  Negative for chest pain.   Gastrointestinal:  Negative for abdominal pain, constipation, diarrhea, nausea and vomiting.   Genitourinary:  Negative for difficulty urinating.   Musculoskeletal:  Positive for back pain, gait problem and myalgias. Negative for neck pain.   Skin:  Negative for rash.   Neurological:  Positive for weakness. Negative for dizziness, light-headedness, numbness and headaches.   Psychiatric/Behavioral:  Positive for sleep disturbance. Negative for hallucinations and suicidal ideas. The patient is not nervous/anxious.        Past Medical History:   Diagnosis Date    Chronic heart failure with preserved ejection fraction 2/9/2023    Chronic midline low back pain without sciatica 10/2/2017    Colon polyps     Coronary artery disease involving native coronary artery of native heart 3/5/2020    Coronary artery disease involving native coronary artery of native heart with angina pectoris 12/10/2020    Diabetes mellitus with neurological manifestations, uncontrolled 1/24/2017    Diabetic polyneuropathy associated with type 2 diabetes mellitus 1/24/2017     Diabetic polyneuropathy associated with type 2 diabetes mellitus     Essential hypertension 1/24/2017    Gastroesophageal reflux disease 1/24/2017    Hyperlipidemia 1/24/2017    Insomnia 1/24/2017    Long-term insulin use 1/24/2017    Longstanding persistent atrial fibrillation 12/30/2020    Lumbar spondylosis 11/13/2017    Nuclear sclerosis of both eyes 8/24/2017    Obesity     PAD (peripheral artery disease) 3/23/2022    Stage 3 chronic kidney disease 2/13/2019    Uncontrolled type 2 diabetes mellitus without complication, with long-term current use of insulin 1/24/2017       Past Surgical History:   Procedure Laterality Date    ARTHROSCOPIC REPAIR OF ROTATOR CUFF OF SHOULDER Right 7/5/2022    Procedure: REPAIR, ROTATOR CUFF, ARTHROSCOPIC;  Surgeon: Valerie Olivera MD;  Location: [facility] OR;  Service: Orthopedics;  Laterality: Right;  HETAL LEMUS [phone]/ TEXTED ALLAN ON 6/23/2022 @ 9:47AM. ALLAN RESPONDED ON 6/23/2022 @ 10:33AM-  JEAN PAUL SALAZARTIER [phone] MY BE HERE FOR CASE SPOKE TO HIM @ 9:59AM ON 7-1-2022  RN PREOP 6/28/2022    BACK SURGERY      2002    CATARACT EXTRACTION W/  INTRAOCULAR LENS IMPLANT Right 08/29/2017    Dr. Hong    CATARACT EXTRACTION W/  INTRAOCULAR LENS IMPLANT Left 02/24/2022    Dr. Hong    COLONOSCOPY N/A 2/21/2017    Procedure: COLONOSCOPY;  Surgeon: Robb Rosado MD;  Location: [facility] ENDO;  Service: Endoscopy;  Laterality: N/A;    COLONOSCOPY N/A 7/5/2023    Procedure: COLONOSCOPY;  Surgeon: Aston Varela MD;  Location: [facility] ENDO;  Service: Endoscopy;  Laterality: N/A;  Referral: JOVANNI SCANLON to hold Plavix 5 days and Eliquis 2 days per Dr Purdy-OFELIA   Meds  Suprep   Inst portal   LW    CORONARY ANGIOGRAPHY INCLUDING BYPASS GRAFTS WITH CATHETERIZATION OF LEFT HEART N/A 11/11/2020    Procedure: ANGIOGRAM, CORONARY, INCLUDING BYPASS GRAFT, WITH LEFT HEART CATHETERIZATION;  Surgeon: Lane Cuellar MD;  Location: [facility] CATH LAB;  Service:  Cardiology;  Laterality: N/A;  RN PRE OP 11-5-2020. --COVID NEGATIVE ON  11-. C A    CORONARY ARTERY BYPASS GRAFT      March 2016    EPIDURAL STEROID INJECTION Bilateral 11/14/2018    Procedure: Lumbar Medial Branch Blocks;  Surgeon: Eze Byrd Jr., MD;  Location: [facility] ENDO;  Service: Pain Management;  Laterality: Bilateral;  Bilateral Lumbar Medial Branch Blocks L2, L3, L4, L5    [id number]  [id number]    Arrive @ 1150; NO Sedation    EPIDURAL STEROID INJECTION Bilateral 11/28/2018    Procedure: Injection, Steroid, Epidural;  Surgeon: Eze Byrd Jr., MD;  Location: [facility] ENDO;  Service: Pain Management;  Laterality: Bilateral;  Bilateral Sacroiliac Joint Steroid Injections    [id number]    Arrive @ 0910    EPIDURAL STEROID INJECTION Bilateral 3/20/2019    Procedure: Injection, Steroid, Sacroiliiac Joint;  Surgeon: Eze Byrd Jr., MD;  Location: [facility] ENDO;  Service: Pain Management;  Laterality: Bilateral;  Bilateral Sacroiliac Joint Steroid Injections    [id number]    Arrive @ 1145; Trigger point injections also?    EPIDURAL STEROID INJECTION Right 8/2/2023    Procedure: Right L5 Transforaminal Epidural Steroid Injection;  Surgeon: Eze Byrd Jr., MD;  Location: [facility] ENDO;  Service: Pain Management;  Laterality: Right;  @0830 (given)  Eliquis last 7/29  Plavix last 7/27  Check BG    INTRAOCULAR PROSTHESES INSERTION Left 2/24/2022    Procedure: INSERTION, IOL PROSTHESIS;  Surgeon: Nickolas Hong MD;  Location: [facility] OR;  Service: Ophthalmology;  Laterality: Left;    PHACOEMULSIFICATION OF CATARACT Left 2/24/2022    Procedure: PHACOEMULSIFICATION, CATARACT;  Surgeon: Nickolas Hong MD;  Location: [facility] OR;  Service: Ophthalmology;  Laterality: Left;  RN Phone Pre Op 2-16-22.  Covid NEGATIVE  2-23-22.  Arrival 08:00 am.    TONSILLECTOMY         Social History     Socioeconomic History    Marital status:    Tobacco Use    Smoking status: Never     Passive exposure: Never     Smokeless tobacco: Never   Substance and Sexual Activity    Alcohol use: Yes     Comment: rare occassion    Drug use: No    Sexual activity: Yes     Partners: Female     Social Determinants of Health     Financial Resource Strain: Low Risk  (9/20/2023)    Overall Financial Resource Strain (CARDIA)     Difficulty of Paying Living Expenses: Not hard at all   Food Insecurity: No Food Insecurity (9/20/2023)    Hunger Vital Sign     Worried About Running Out of Food in the Last Year: Never true     Ran Out of Food in the Last Year: Never true   Transportation Needs: No Transportation Needs (9/20/2023)    PRAPARE - Transportation     Lack of Transportation (Medical): No     Lack of Transportation (Non-Medical): No   Physical Activity: Insufficiently Active (9/20/2023)    Exercise Vital Sign     Days of Exercise per Week: 3 days     Minutes of Exercise per Session: 30 min   Stress: Stress Concern Present (9/20/2023)    [place] [employer] - Occupational Stress Questionnaire     Feeling of Stress : To some extent   Social Connections: Moderately Integrated (9/20/2023)    Social Connection and Isolation Panel [NHANES]     Frequency of Communication with Friends and Family: More than three times a week     Frequency of Social Gatherings with Friends and Family: Twice a week     Attends Jewish Services: Never     Active Member of Clubs or Organizations: Yes     Attends Club or Organization Meetings: More than 4 times per year     Marital Status:    Housing Stability: Low Risk  (9/20/2023)    Housing Stability Vital Sign     Unable to Pay for Housing in the Last Year: No     Number of Places Lived in the Last Year: 1     Unstable Housing in the Last Year: No       Review of patient's allergies indicates:   Allergen Reactions    Penicillins Other (See Comments)     Unknown reaction, Had a reaction as a child    Shrimp Itching     Hand itching       Current Outpatient Medications on File Prior to  "Visit   Medication Sig Dispense Refill    albuterol (PROVENTIL/VENTOLIN HFA) 90 mcg/actuation inhaler Inhale 2 puffs into the lungs every 4 (four) hours as needed for Shortness of Breath. Rescue 17 g 3    apixaban (ELIQUIS) 5 mg Tab Take 1 tablet (5 mg total) by mouth 2 (two) times daily. 180 tablet 3    ascorbic acid, vitamin C, (VITAMIN C) 100 MG tablet Take 100 mg by mouth daily as needed.      atorvastatin (LIPITOR) 80 MG tablet Take 1 tablet (80 mg total) by mouth every evening. 90 tablet 3    azelastine (ASTELIN) 137 mcg (0.1 %) nasal spray 1 spray (137 mcg total) by Nasal route 2 (two) times daily. 30 mL 3    b complex vitamins tablet Take 1 tablet by mouth once daily.      BD ALCOHOL SWABS PadM       BD ULTRA-FINE ROLAND PEN NEEDLES 32 gauge x 5/32" Ndle       blood sugar diagnostic Strp Check blood glucose 2 times a day. True metrix. E11.65 200 strip 3    blood-glucose meter kit Test glucose 2-3x/day. True metrix 1 each 0    celecoxib (CELEBREX) 100 MG capsule TAKE 1 CAPSULE(100 MG) BY MOUTH EVERY DAY 30 capsule 6    clopidogreL (PLAVIX) 75 mg tablet TAKE 1 TABLET(75 MG) BY MOUTH EVERY DAY 90 tablet 3    coenzyme Q10 100 mg capsule Take 100 mg by mouth once daily.      dapagliflozin (FARXIGA) 5 mg Tab tablet Take 1 tablet (5 mg total) by mouth once daily. 30 tablet 3    ergocalciferol (VITAMIN D2) 50,000 unit Cap TAKE ONE CAPSULE BY MOUTH WEEKLY 12 capsule 2    fenofibrate 160 MG Tab TAKE 1 TABLET EVERY MORNING 90 tablet 1    fluticasone propionate (FLONASE) 50 mcg/actuation nasal spray 2 sprays (100 mcg total) by Each Nostril route 2 (two) times daily. 18.2 mL 3    furosemide (LASIX) 20 MG tablet TAKE 1 TABLET TWICE DAILY 180 tablet 3    gabapentin (NEURONTIN) 100 MG capsule TAKE 2 CAPSULES EVERY MORNING AND TAKE 3 CAPSULES EVERY DAY WITH DINNER 450 capsule 12    glimepiride (AMARYL) 2 MG tablet TAKE 1 TABLET EVERY DAY BEFORE BREAKFAST 90 tablet 2    hydrALAZINE (APRESOLINE) 50 MG tablet Take 1 tablet (50 mg " total) by mouth 2 (two) times a day. 180 tablet 3    insulin degludec (TRESIBA FLEXTOUCH U-100) 100 unit/mL (3 mL) insulin pen Inject 20 Units into the skin once daily. 30 mL 4    isosorbide mononitrate (IMDUR) 60 MG 24 hr tablet Take 1 tablet (60 mg total) by mouth once daily. 90 tablet 11    ketoconazole (NIZORAL) 2 % cream Apply topically once daily. 60 g 6    lancets Misc Check blood glucose 2x/day. True metrix. E11.65 200 each 3    magnesium 250 mg Tab Take 1 tablet by mouth once daily.       metFORMIN (GLUCOPHAGE-XR) 500 MG ER 24hr tablet Take 1 tablet with breakfast and 2 tablets with supper. 270 tablet 2    nitroGLYCERIN (NITROSTAT) 0.4 MG SL tablet Place 1 tablet (0.4 mg total) under the tongue every 5 (five) minutes as needed for Chest pain. 25 tablet 11    omega-3 acid ethyl esters (LOVAZA) 1 gram capsule Take 2 capsules (2 g total) by mouth 2 (two) times daily. 360 capsule 3    pantoprazole (PROTONIX) 40 MG tablet TAKE 1 TABLET EVERY DAY 90 tablet 3    semaglutide (OZEMPIC) 2 mg/dose (8 mg/3 mL) PnIj Inject 2 mg into the skin every 7 days. 4 each 3    semaglutide (OZEMPIC) 2 mg/dose (8 mg/3 mL) PnIj Inject 2 mg into the skin every 7 days. 1 each 0    tiZANidine (ZANAFLEX) 4 MG tablet Take 1 tablet (4 mg total) by mouth every 6 (six) hours as needed (pain). 360 tablet 3    triazolam (HALCION) 0.25 MG Tab TAKE 1 TABLET BY MOUTH EVERY NIGHT AT BEDTIME AS NEEDED 90 tablet 5    TRUEPLUS LANCETS 33 gauge [facility]        Current Facility-Administered Medications on File Prior to Visit   Medication Dose Route Frequency Provider Last Rate Last Admin    cyclopentolate 1% ophthalmic solution 1 drop  1 drop Left Eye On Call Procedure Nickolas Hong MD   1 drop at 02/24/22 0901    ofloxacin 0.3 % ophthalmic solution 1 drop  1 drop Left Eye On Call Procedure Nickolas Hong MD   2 drop at 02/24/22 1017    sodium chloride 0.9% flush 10 mL  10 mL Intravenous PRN Nickolas Hong MD         triamcinolone acetonide injection 40 mg  40 mg Intra-articular  Valerie Olivera MD   40 mg at 02/09/23 1030       Objective:      BP (!) 161/73 (BP Location: Right arm, Patient Position: Sitting, BP Method: Medium (Automatic))   Pulse 61   Resp 18   SpO2 98%     Exam:  GEN:  Well developed, well nourished.  No acute distress.  Normal pain behavior.  HEENT:  No trauma.  Mucous membranes moist.  Nares patent bilaterally.  PSYCH: Normal affect. Thought content appropriate.  CHEST:  Breathing symmetric.  No audible wheezing.  ABD: Soft, non-distended.  SKIN:  Warm, pink, dry.  No rash on exposed areas.    EXT:  No cyanosis, clubbing, or edema.  No color change or changes in nail or hair growth.  NEURO/MUSCULOSKELETAL:  Fully alert, oriented, and appropriate. Speech normal ivania. No cranial nerve deficits.   Gait:  Antalgic.  No trendelenburg sign bilaterally.   Motor Strength:  5/5 motor strength throughout lower extremities.   Sensory:  No sensory deficit in the lower extremities.   L-Spine:  Limited ROM with pain on flexion more than extension.  Negative pain with axial/facet loading bilaterally.  Positive SLR on the right.    Positive TTP over midline lower lumbar spine          Imaging:    Narrative & Impression    EXAMINATION:  MRI LUMBAR SPINE WITHOUT CONTRAST     CLINICAL HISTORY:  Lumbar radiculopathy, symptoms persist with conservative treatment; Spondylosis without myelopathy or radiculopathy, lumbar region     TECHNIQUE:  Multiplanar, multisequence MR images were acquired from the thoracolumbar junction to the sacrum without the administration of contrast.     COMPARISON:  02/11/2019.     FINDINGS:  There is susceptibility weighted artifact within the midline lower lumbar spine, suggestive of prior instrumentation.     The lumbar alignment is within normal limits.  There are scattered Schmorl's nodes.  The vertebral body heights are maintained.  The bone marrow signal is within normal limits.     The  conus terminates at the level of T12.  The cauda equina nerve roots are within normal limits.     There is multilevel disc desiccation.  Evaluation of the individual disc levels reveals the following:     L1-L2, there is a disc bulge along with facet hypertrophy and ligamentum flavum hypertrophy.  The spinal canal and neural foramina are unremarkable.     L2-L3, there is a disc bulge along with facet hypertrophy and ligamentum flavum hypertrophy.  The spinal canal and neural foramina are unremarkable There is small amount of fluid within the facet joints.     L3-L4, there is diffuse disc bulge along with facet hypertrophy and ligamentum flavum.  The spinal canal is within normal limits.  There is mild spinal canal narrowing.     L4-L5, there is diffuse disc bulge along with facet hypertrophy and ligamentum flavum hypertrophy.  There is superimposed central disc protrusion.  There is marked narrowing of the lateral recesses.  There is mild to moderate narrowing the spinal canal.  There is moderate bilateral neural foraminal narrowing.     L5-S1, there is diffuse disc bulge along with facet hypertrophy and ligamentum flavum.  The spinal canal is within normal limits.  There is moderate neural foraminal narrowing.     The paraspinal soft tissues are unremarkable.     Impression:     Changes of prior instrumentation in the lower lumbar spine at the L4-L5 level.  Unchanged appearance of soft tissue at the prior disc site at the L4-L5 level.     Mild to moderate narrowing of the spinal canal at the L4-L5 level.     Mild-to-moderate neural foraminal narrowing the lower lumbar spine as above.        Electronically signed by: Kevin Erickson MD  Date:                                            07/18/2023  Time:                                           09:04       Narrative & Impression    EXAMINATION:  XR CERVICAL SPINE AP LAT WITH FLEX EXTEN     CLINICAL HISTORY:  Other cervical disc degeneration, unspecified cervical  region     TECHNIQUE:  Three views of the cervical spine plus flexion and extension views were performed.     COMPARISON:  None     FINDINGS:  Cervical spine is normal in alignment, vertebral body heights are maintained.  No displaced fracture or subluxation.  No suspicious bone lesion.     No instability between the flexion and extension images.     Mild and moderate multilevel degenerative disc disease and uncovertebral DJD.  Anterior osteophytosis at levels C2-3, C4-5, and C5-6.  Mild multilevel facet DJD.     Unremarkable soft tissues.     Impression:     No acute abnormality.  No instability.     Mild and moderate multilevel DJD.        Electronically signed by: Marco Vásquez MD  Date:                                            08/18/2021  Time:                                           08:35           Narrative     EXAMINATION:  MRI LUMBAR SPINE WITHOUT CONTRAST    CLINICAL HISTORY:  lumbar ddd; Other intervertebral disc degeneration, lumbar region    TECHNIQUE:  Multiplanar, multisequence MR images were acquired from the thoracolumbar junction to the sacrum without the administration of contrast.    COMPARISON:  08/23/2017    FINDINGS:  There is no evidence of fracture or marrow replacement process.  There is disc desiccation at all lumbar levels with disc space narrowing at multiple levels but most significant at L4-5.  Sagittal alignment is maintained.  Conus terminates at T12-L1.  Visualized retroperitoneal structures demonstrate no significant abnormalities.    T12-L1, L1-L2: Mild diffuse disc bulge with no significant central canal stenosis or neural foraminal narrowing.    L2-L3: Mild diffuse disc bulge with moderate facet arthropathy.  No significant central canal stenosis or neural foraminal narrowing.    L3-4: Mild diffuse disc bulge extending into both neural foramen with moderate facet arthropathy causing moderate right and mild left neural foraminal narrowing.  No significant central canal  stenosis.    L4-5: Hemilaminectomy defect on the right.  There is a diffuse disc bulge extending into both neural foramen with a superimposed central extrusion with an annular fissure extending slightly below the level of the disc plane.  This causes mass effect on the lateral recess and may impinge on the descending nerve roots although the canal is decompressed posteriorly by the laminectomy defect with no significant central canal stenosis.  There is severe facet arthropathy contributing to severe bilateral neural foraminal narrowing.    L5-S1: There is severe facet arthropathy.  There is a diffuse disc bulge with no significant central canal stenosis.  There is severe bilateral neural foraminal narrowing..   Impression       Postoperative changes at L4 on the right with decompression of the central canal.  There is lumbar spondylosis most significant at L4-5 and L5-S1 where there is severe bilateral neural foraminal narrowing.  Specific details at each level are discussed above.      Electronically signed by: Quinton Goins MD  Date: 02/12/2019  Time: 10:10         Assessment:       No diagnosis found.    Plan:       There are no diagnoses linked to this encounter.    Driss Burttima Fermin. is a 72 y.o. male with worsening chronic bilateral low back pain.  Also with relatively new or symptoms concerning for right L3/L4 radicular pain.  No overt neurologic deficits noted on examination.  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  This is chronic.  Mild improvement following right L5 TF ANNIE.    Prior records reviewed.  Pertinent imaging studies reviewed by me. Imaging results were discussed with patient.  Schedule for right Infraneural L3, supraneural S1 transforaminal epidural steroid injection.  If limited benefit may consider referral to Neurosurgery in the future.  Although patient not overtly interested in exploring surgical options at this time.  Patient can continue with medications since they are  providing benefit, using them appropriately, and without adverse effects.  Return to clinic 2 weeks postprocedure to discuss efficacy.      Grant German PA-C  [facility]  Interventional Pain Management   09/21/2023    This note was created by combination of typed  and M-Modal dictation.  Transcription and phonetic errors may be present.  If there are any questions, please contact me.

## 2023-09-21 NOTE — PROGRESS NOTES
Subjective:     Patient ID: Driss Hernandez Jr. is a 72 y.o. male    Chief Complaint: Follow-up      Referred by: No ref. provider found      HPI:    Interval History PA (09/21/2023):  Patient returns to clinic for follow up.  Patient notes some worsening of his chronic right-sided lower back and extremity pain.  Notes previous right L5 TF ANNIE done on August did provide some initial relief although short-lived.  States pain was manageable for a few weeks however has since been worsening.  Continues to endorse pain located in his right lumbar paraspinal region.  Majority of his pain is located from his right lateral hip radiating into his right anterior thigh, down to his knee.  Denies radiation distal to this point.  Denies any numbness, tingling, weakness, bowel bladder dysfunction.  Notes difficulty sleeping due to increased pain, worsened when lying supine.  Patient interested in proceeding with the previously discussed procedure.  States that he is not overtly interested in exploring surgical options at this time.    Interval History PA (08/17/2023):  Patient returns to clinic for follow up.  Patient is s/p right L5 transforaminal epidural steroid injection done on 08/02/2023 reporting some initial relief with the 1st few days following the procedure.  Overall continued 30% relief.  Denies any changes in the quality or location of his pain.  States that he has been having minimal back pain, only notices a pressure sensation in his lower lumbar spine.  The majority of his pain is located between his right lateral hip into his anterior thigh stopping at the knee.  States his pain is intermittent, typically only present when getting out of bed, or standing up from a chair.  Notes minimal to no pain with activity or rest.  States symptoms still bothersome but overall tolerable at this time.  Denies any numbness, tingling, weakness, bowel or bladder dysfunction.    Interval History (7/18/23):  He returns today  for follow up and MRI review.  He reports that his pain is unchanged in quality and location since last encounter.  He denies any new or worsening symptoms.      Interval History (7/14/23):  He returns today for follow up.  He reports that he has been having worsening low back and lower extremity pain.  States he continues to have bilateral low back pain as before though it has worsened since last encounter.  Also states that he is now having right-sided hip and thigh pain associated with his back pain.  These symptoms have been present for roughly 6 months.  This is typically present when sitting for prolonged periods of time.  States when he gets up he states that his thigh feels like it is being twisted.  The pain extends in a distribution consistent with the L3 dermatome.  Denies any associated paresthesias.  Does feel that the right lower extremity is weak when getting up after sitting though strength will return to normal after a few minutes.  Denies any associated bowel or bladder dysfunction.      Interval History NP (5/17/2022):  Mr Hernandez presents for delayed FU. Returning lower back pain where TPIs have done well in past and requesting repeat. He will be having shoulder surgery upcoming. Denies new areas of pain or neurological changes. No focal voicing of  myelopathic symptoms such as handwriting changes or difficulty with buttons/coins/keys. Denies perineal paresthesias, bowel/bladder dysfunction. Currently taking neurontin and zanaflex being taken minimally at this time.       Interval History (9/22/21):  He returns today for follow up.  He reports that TPIs done at last visit have been helpful. His low back is doing well and does not need any further attention at this time. He does continue to have intermittent severe right shoulder pain. He denies any changes in the quality or location of this pain since last encounter. He denies any new or worsened symptoms.       Interval History (8/19/21):  He  returns today for follow up.  He reports that physical therapy has been helpful for the right neck and shoulder pain.  He states that his neck pain was never very severe and has now essentially resolved.  He states that the vast majority of his pain is located right anterior shoulder.  The pain is exacerbated with certain movements and is particularly bothersome while sleeping.  He denies any associated numbness tingling with right shoulder pain.  Patient also states that he has recurrent low back pain.  Similar in quality and location to previous back pain that was well treated with trigger point injections.  He would like repeat trigger point injections done today      Interval History (6/4/21):  He returns today for follow up.  He reports that he has had right-sided neck and upper extremity pain for the past 2 weeks.  He states the pain started after handing his wife a heavy flower pot.  The pain is located the right lower cervical paraspinal region radiate to the right wrist with associated numbness and tingling in the fingers of the right hand.  He denies any focal weakness or bowel bladder dysfunction.  The pain is constant worse with activity.  The pain disrupts his sleep.       Interval History (1/6/20):  He returns today for follow up.  He reports that lumbar trigger point injections have been helpful for the bilateral low back pain. He reports greater than 85% relief for over 9 months.  He states the pain has returned.  He denies any changes in the quality or location was pain. He would like repeat trigger point injections today.      Interval History (3/18/19):  He returns today for follow up.  He reports that trigger point injections have been helpful for the right-sided low back pain. He reports near complete resolution of the sharp right-sided low back pain. He does report that his previous low back pain starting to return.  He feels as though his current pain is exact same pain that was previously  helped by sacroiliac joint injections.  He is interested in repeat injections if appropriate.      Interval History (2/12/19):  He returns today for follow up and imaging review.  He reports that his pain is unchanged since last encounter.  He denies any new or worsening neurologic symptoms.      Interval History (2/7/19):  He returns today for follow up.  He reports that he has been having acute right-sided low back pain for about 1 week.  He denies any specific inciting event or injury.  The pain is located in the lateral aspect of the right lumbosacral region.  It does not radiate.  He denies any associated numbness, tingling, weakness, bowel bladder dysfunction.  The pain varies in intensity from day-to-day.  The pain is much worse with coughing and sneezing and sitting with a forward flexed posture.  He is still able to go to the gym at times.      Interval History (12/17/18):  He returns today for follow up.  He reports that bilateral SIJ injections has been helpful for the low back pain. He reports about 80% relief. He does not feel that he needs any further interventions at this time      Interval History (11/19/18):  He returns today for follow up.  He reports that bilateral lumbar medial branch blocks were not helpful. Pain is unchanged in quality and location since last visit. He denies any new symptoms.       Interval History (10/16/18):  He returns today for follow up.  He reports that he has continued to have bilateral low back pain. He still has some right lower extremity pain, but this is not as bothersome as his low back pain. The pain is located across the lower lumbar region R>L. It does not radiate. It is not associated with any numbness, tingling, weakness or b/b dysfunction. The pain is worse with activity. He also requests refills of tizanidine.      Interval History (12/18/17):  He returns today for follow up.  He reports that right L4 TFESI has been helpful for the right lumbar radicular  pain. He reports 100% relief. He still has some lower back pain, but would prefer to discuss this later. Otherwise he is doing well.       Interval History (11/13/17):  He returns today for follow up.  He reports that PT has been helpful for the low back pain. He still has significant right foot and ankle pain when sitting in a reclined postion. This is the most painful area. He also has low back pain that is constant but does not bother him as much. Otherwise he is doing well.       Driss Hernadnez Jr. is a 72 y.o. male who presents today with chronic bilateral low back pain R>>>L. Pain started over 40 years ago. No inciting injury or event noted. Pain is located mainly on the right side of the lower lumbar paraspinals. About 5 weeks ago, patient began to have right lwoer extremity pain that he felt was related to his back pain, but this has subsided. He denies any numbness, tingling, weakness, or b/b dysfunction. The pain is described as dull, but can become sharp at times. Patient states that his pain is worse in the morning. He does not sleep well. States that he wakes up every 45 minutes. Has taken Tizanidine PRN in the pat, but cannot recall how it affected him. Probably has not taken in over a year. Cannot take NSAIDs due to decreased renal function. Has taken in the past with mild relief.  This pain is described in detail below.    Physical Therapy:  Yes.    Non-pharmacologic Treatment: Nothing really helps         TENS? No    Pain Medications:         Currently taking: Gabapentin, Tizandine. Tylenol p.r.n., Celebrex    Has tried in the past:  NSAIDs    Has not tried: Opioids, Tylenol, TCAs, SNRIs, topical creams    Blood thinners:  Plavix, Eliquis    Interventional Therapies:   11/29/17 - Right L4 TFESI - 100% relief  10/28/18 - bilateral SIJ injections -  80% relief  11/14/18 - Bilateral L2, L3, L4 and L5 MBBs - No relief noted  3/20/19 - bilateral sacroiliac joint steroid injections  08/02/2023 -  right L5 transforaminal epidural steroid injection - 30% relief    Relevant Surgeries: Previous right L4 hemilaminectomy    Affecting sleep? Yes    Affecting daily activities? yes    Depressive symptoms? no          SI/HI? No    Work status: Retired    Pain Scores:    Best:       2/10  Worst:     10/10  Usually:   7/10  Today:    4/10    Pain Disability Index  Family/Home Responsibilities:: 4  Recreation:: 4  Social Activity:: 4  Occupation:: 5  Sexual Behavior:: 4  Self Care:: 6  Life-Support Activities:: 10  Pain Disability Index (PDI): 37(     Review of Systems   Constitutional:  Negative for activity change, appetite change, chills, fatigue, fever and unexpected weight change.   HENT:  Negative for hearing loss.    Eyes:  Negative for visual disturbance.   Respiratory:  Negative for chest tightness and shortness of breath.    Cardiovascular:  Negative for chest pain.   Gastrointestinal:  Negative for abdominal pain, constipation, diarrhea, nausea and vomiting.   Genitourinary:  Negative for difficulty urinating.   Musculoskeletal:  Positive for back pain, gait problem and myalgias. Negative for neck pain.   Skin:  Negative for rash.   Neurological:  Positive for weakness. Negative for dizziness, light-headedness, numbness and headaches.   Psychiatric/Behavioral:  Positive for sleep disturbance. Negative for hallucinations and suicidal ideas. The patient is not nervous/anxious.        Past Medical History:   Diagnosis Date    Chronic heart failure with preserved ejection fraction 2/9/2023    Chronic midline low back pain without sciatica 10/2/2017    Colon polyps     Coronary artery disease involving native coronary artery of native heart 3/5/2020    Coronary artery disease involving native coronary artery of native heart with angina pectoris 12/10/2020    Diabetes mellitus with neurological manifestations, uncontrolled 1/24/2017    Diabetic polyneuropathy associated with type 2 diabetes mellitus 1/24/2017     Diabetic polyneuropathy associated with type 2 diabetes mellitus     Essential hypertension 1/24/2017    Gastroesophageal reflux disease 1/24/2017    Hyperlipidemia 1/24/2017    Insomnia 1/24/2017    Long-term insulin use 1/24/2017    Longstanding persistent atrial fibrillation 12/30/2020    Lumbar spondylosis 11/13/2017    Nuclear sclerosis of both eyes 8/24/2017    Obesity     PAD (peripheral artery disease) 3/23/2022    Stage 3 chronic kidney disease 2/13/2019    Uncontrolled type 2 diabetes mellitus without complication, with long-term current use of insulin 1/24/2017       Past Surgical History:   Procedure Laterality Date    ARTHROSCOPIC REPAIR OF ROTATOR CUFF OF SHOULDER Right 7/5/2022    Procedure: REPAIR, ROTATOR CUFF, ARTHROSCOPIC;  Surgeon: Valerie Olivera MD;  Location: [facility] OR;  Service: Orthopedics;  Laterality: Right;  HETAL LMEUS [phone]/ TEXTED ALLAN ON 6/23/2022 @ 9:47AM. ALLAN RESPONDED ON 6/23/2022 @ 10:33AM-  JEAN PAUL SALAZARTIER [phone] MY BE HERE FOR CASE SPOKE TO HIM @ 9:59AM ON 7-1-2022  RN PREOP 6/28/2022    BACK SURGERY      2002    CATARACT EXTRACTION W/  INTRAOCULAR LENS IMPLANT Right 08/29/2017    Dr. Hong    CATARACT EXTRACTION W/  INTRAOCULAR LENS IMPLANT Left 02/24/2022    Dr. Hong    COLONOSCOPY N/A 2/21/2017    Procedure: COLONOSCOPY;  Surgeon: Robb Rosado MD;  Location: [facility] ENDO;  Service: Endoscopy;  Laterality: N/A;    COLONOSCOPY N/A 7/5/2023    Procedure: COLONOSCOPY;  Surgeon: Aston Varela MD;  Location: [facility] ENDO;  Service: Endoscopy;  Laterality: N/A;  Referral: JOVANNI SCANLON to hold Plavix 5 days and Eliquis 2 days per Dr Purdy-OFELIA   Meds  Suprep   Inst portal   LW    CORONARY ANGIOGRAPHY INCLUDING BYPASS GRAFTS WITH CATHETERIZATION OF LEFT HEART N/A 11/11/2020    Procedure: ANGIOGRAM, CORONARY, INCLUDING BYPASS GRAFT, WITH LEFT HEART CATHETERIZATION;  Surgeon: Lane Cuellar MD;  Location: [facility] CATH LAB;  Service:  Cardiology;  Laterality: N/A;  RN PRE OP 11-5-2020. --COVID NEGATIVE ON  11-. C A    CORONARY ARTERY BYPASS GRAFT      March 2016    EPIDURAL STEROID INJECTION Bilateral 11/14/2018    Procedure: Lumbar Medial Branch Blocks;  Surgeon: Eze Byrd Jr., MD;  Location: [facility] ENDO;  Service: Pain Management;  Laterality: Bilateral;  Bilateral Lumbar Medial Branch Blocks L2, L3, L4, L5    [id number]  [id number]    Arrive @ 1150; NO Sedation    EPIDURAL STEROID INJECTION Bilateral 11/28/2018    Procedure: Injection, Steroid, Epidural;  Surgeon: Eze Byrd Jr., MD;  Location: [facility] ENDO;  Service: Pain Management;  Laterality: Bilateral;  Bilateral Sacroiliac Joint Steroid Injections    [id number]    Arrive @ 0910    EPIDURAL STEROID INJECTION Bilateral 3/20/2019    Procedure: Injection, Steroid, Sacroiliiac Joint;  Surgeon: Eze Byrd Jr., MD;  Location: [facility] ENDO;  Service: Pain Management;  Laterality: Bilateral;  Bilateral Sacroiliac Joint Steroid Injections    [id number]    Arrive @ 1145; Trigger point injections also?    EPIDURAL STEROID INJECTION Right 8/2/2023    Procedure: Right L5 Transforaminal Epidural Steroid Injection;  Surgeon: Eze Byrd Jr., MD;  Location: [facility] ENDO;  Service: Pain Management;  Laterality: Right;  @0830 (given)  Eliquis last 7/29  Plavix last 7/27  Check BG    INTRAOCULAR PROSTHESES INSERTION Left 2/24/2022    Procedure: INSERTION, IOL PROSTHESIS;  Surgeon: Nickolas Hong MD;  Location: [facility] OR;  Service: Ophthalmology;  Laterality: Left;    PHACOEMULSIFICATION OF CATARACT Left 2/24/2022    Procedure: PHACOEMULSIFICATION, CATARACT;  Surgeon: Nickolas Hong MD;  Location: [facility] OR;  Service: Ophthalmology;  Laterality: Left;  RN Phone Pre Op 2-16-22.  Covid NEGATIVE  2-23-22.  Arrival 08:00 am.    TONSILLECTOMY         Social History     Socioeconomic History    Marital status:    Tobacco Use    Smoking status: Never     Passive exposure: Never     Smokeless tobacco: Never   Substance and Sexual Activity    Alcohol use: Yes     Comment: rare occassion    Drug use: No    Sexual activity: Yes     Partners: Female     Social Determinants of Health     Financial Resource Strain: Low Risk  (9/20/2023)    Overall Financial Resource Strain (CARDIA)     Difficulty of Paying Living Expenses: Not hard at all   Food Insecurity: No Food Insecurity (9/20/2023)    Hunger Vital Sign     Worried About Running Out of Food in the Last Year: Never true     Ran Out of Food in the Last Year: Never true   Transportation Needs: No Transportation Needs (9/20/2023)    PRAPARE - Transportation     Lack of Transportation (Medical): No     Lack of Transportation (Non-Medical): No   Physical Activity: Insufficiently Active (9/20/2023)    Exercise Vital Sign     Days of Exercise per Week: 3 days     Minutes of Exercise per Session: 30 min   Stress: Stress Concern Present (9/20/2023)    [employer] - Occupational Stress Questionnaire     Feeling of Stress : To some extent   Social Connections: Moderately Integrated (9/20/2023)    Social Connection and Isolation Panel [NHANES]     Frequency of Communication with Friends and Family: More than three times a week     Frequency of Social Gatherings with Friends and Family: Twice a week     Attends Mandaeism Services: Never     Active Member of Clubs or Organizations: Yes     Attends Club or Organization Meetings: More than 4 times per year     Marital Status:    Housing Stability: Low Risk  (9/20/2023)    Housing Stability Vital Sign     Unable to Pay for Housing in the Last Year: No     Number of Places Lived in the Last Year: 1     Unstable Housing in the Last Year: No       Review of patient's allergies indicates:   Allergen Reactions    Penicillins Other (See Comments)     Unknown reaction, Had a reaction as a child    Shrimp Itching     Hand itching       Current Outpatient Medications on File Prior to  "Visit   Medication Sig Dispense Refill    albuterol (PROVENTIL/VENTOLIN HFA) 90 mcg/actuation inhaler Inhale 2 puffs into the lungs every 4 (four) hours as needed for Shortness of Breath. Rescue 17 g 3    apixaban (ELIQUIS) 5 mg Tab Take 1 tablet (5 mg total) by mouth 2 (two) times daily. 180 tablet 3    ascorbic acid, vitamin C, (VITAMIN C) 100 MG tablet Take 100 mg by mouth daily as needed.      atorvastatin (LIPITOR) 80 MG tablet Take 1 tablet (80 mg total) by mouth every evening. 90 tablet 3    azelastine (ASTELIN) 137 mcg (0.1 %) nasal spray 1 spray (137 mcg total) by Nasal route 2 (two) times daily. 30 mL 3    b complex vitamins tablet Take 1 tablet by mouth once daily.      BD ALCOHOL SWABS PadM       BD ULTRA-FINE ROLAND PEN NEEDLES 32 gauge x 5/32" Ndle       blood sugar diagnostic Strp Check blood glucose 2 times a day. True metrix. E11.65 200 strip 3    blood-glucose meter kit Test glucose 2-3x/day. True metrix 1 each 0    celecoxib (CELEBREX) 100 MG capsule TAKE 1 CAPSULE(100 MG) BY MOUTH EVERY DAY 30 capsule 6    clopidogreL (PLAVIX) 75 mg tablet TAKE 1 TABLET(75 MG) BY MOUTH EVERY DAY 90 tablet 3    coenzyme Q10 100 mg capsule Take 100 mg by mouth once daily.      dapagliflozin (FARXIGA) 5 mg Tab tablet Take 1 tablet (5 mg total) by mouth once daily. 30 tablet 3    ergocalciferol (VITAMIN D2) 50,000 unit Cap TAKE ONE CAPSULE BY MOUTH WEEKLY 12 capsule 2    fenofibrate 160 MG Tab TAKE 1 TABLET EVERY MORNING 90 tablet 1    fluticasone propionate (FLONASE) 50 mcg/actuation nasal spray 2 sprays (100 mcg total) by Each Nostril route 2 (two) times daily. 18.2 mL 3    furosemide (LASIX) 20 MG tablet TAKE 1 TABLET TWICE DAILY 180 tablet 3    gabapentin (NEURONTIN) 100 MG capsule TAKE 2 CAPSULES EVERY MORNING AND TAKE 3 CAPSULES EVERY DAY WITH DINNER 450 capsule 12    glimepiride (AMARYL) 2 MG tablet TAKE 1 TABLET EVERY DAY BEFORE BREAKFAST 90 tablet 2    hydrALAZINE (APRESOLINE) 50 MG tablet Take 1 tablet (50 mg " total) by mouth 2 (two) times a day. 180 tablet 3    insulin degludec (TRESIBA FLEXTOUCH U-100) 100 unit/mL (3 mL) insulin pen Inject 20 Units into the skin once daily. 30 mL 4    isosorbide mononitrate (IMDUR) 60 MG 24 hr tablet Take 1 tablet (60 mg total) by mouth once daily. 90 tablet 11    ketoconazole (NIZORAL) 2 % cream Apply topically once daily. 60 g 6    lancets Misc Check blood glucose 2x/day. True metrix. E11.65 200 each 3    magnesium 250 mg Tab Take 1 tablet by mouth once daily.       metFORMIN (GLUCOPHAGE-XR) 500 MG ER 24hr tablet Take 1 tablet with breakfast and 2 tablets with supper. 270 tablet 2    nitroGLYCERIN (NITROSTAT) 0.4 MG SL tablet Place 1 tablet (0.4 mg total) under the tongue every 5 (five) minutes as needed for Chest pain. 25 tablet 11    omega-3 acid ethyl esters (LOVAZA) 1 gram capsule Take 2 capsules (2 g total) by mouth 2 (two) times daily. 360 capsule 3    pantoprazole (PROTONIX) 40 MG tablet TAKE 1 TABLET EVERY DAY 90 tablet 3    semaglutide (OZEMPIC) 2 mg/dose (8 mg/3 mL) PnIj Inject 2 mg into the skin every 7 days. 4 each 3    semaglutide (OZEMPIC) 2 mg/dose (8 mg/3 mL) PnIj Inject 2 mg into the skin every 7 days. 1 each 0    tiZANidine (ZANAFLEX) 4 MG tablet Take 1 tablet (4 mg total) by mouth every 6 (six) hours as needed (pain). 360 tablet 3    triazolam (HALCION) 0.25 MG Tab TAKE 1 TABLET BY MOUTH EVERY NIGHT AT BEDTIME AS NEEDED 90 tablet 5    TRUEPLUS LANCETS 33 gauge [facility]        Current Facility-Administered Medications on File Prior to Visit   Medication Dose Route Frequency Provider Last Rate Last Admin    cyclopentolate 1% ophthalmic solution 1 drop  1 drop Left Eye On Call Procedure Nickolas Hong MD   1 drop at 02/24/22 0901    ofloxacin 0.3 % ophthalmic solution 1 drop  1 drop Left Eye On Call Procedure Nickolas Hong MD   2 drop at 02/24/22 1017    sodium chloride 0.9% flush 10 mL  10 mL Intravenous PRN Nickolas Hong MD         triamcinolone acetonide injection 40 mg  40 mg Intra-articular  Valerie Olivera MD   40 mg at 02/09/23 1030       Objective:      BP (!) 161/73 (BP Location: Right arm, Patient Position: Sitting, BP Method: Medium (Automatic))   Pulse 61   Resp 18   SpO2 98%     Exam:  GEN:  Well developed, well nourished.  No acute distress.  Normal pain behavior.  HEENT:  No trauma.  Mucous membranes moist.  Nares patent bilaterally.  PSYCH: Normal affect. Thought content appropriate.  CHEST:  Breathing symmetric.  No audible wheezing.  ABD: Soft, non-distended.  SKIN:  Warm, pink, dry.  No rash on exposed areas.    EXT:  No cyanosis, clubbing, or edema.  No color change or changes in nail or hair growth.  NEURO/MUSCULOSKELETAL:  Fully alert, oriented, and appropriate. Speech normal ivania. No cranial nerve deficits.   Gait:  Antalgic.  No trendelenburg sign bilaterally.   Motor Strength:  5/5 motor strength throughout lower extremities.   Sensory:  No sensory deficit in the lower extremities.   L-Spine:  Limited ROM with pain on flexion more than extension.  Negative pain with axial/facet loading bilaterally.  Positive SLR on the right.    Positive TTP over midline lower lumbar spine          Imaging:    Narrative & Impression    EXAMINATION:  MRI LUMBAR SPINE WITHOUT CONTRAST     CLINICAL HISTORY:  Lumbar radiculopathy, symptoms persist with conservative treatment; Spondylosis without myelopathy or radiculopathy, lumbar region     TECHNIQUE:  Multiplanar, multisequence MR images were acquired from the thoracolumbar junction to the sacrum without the administration of contrast.     COMPARISON:  02/11/2019.     FINDINGS:  There is susceptibility weighted artifact within the midline lower lumbar spine, suggestive of prior instrumentation.     The lumbar alignment is within normal limits.  There are scattered Schmorl's nodes.  The vertebral body heights are maintained.  The bone marrow signal is within normal limits.     The  conus terminates at the level of T12.  The cauda equina nerve roots are within normal limits.     There is multilevel disc desiccation.  Evaluation of the individual disc levels reveals the following:     L1-L2, there is a disc bulge along with facet hypertrophy and ligamentum flavum hypertrophy.  The spinal canal and neural foramina are unremarkable.     L2-L3, there is a disc bulge along with facet hypertrophy and ligamentum flavum hypertrophy.  The spinal canal and neural foramina are unremarkable There is small amount of fluid within the facet joints.     L3-L4, there is diffuse disc bulge along with facet hypertrophy and ligamentum flavum.  The spinal canal is within normal limits.  There is mild spinal canal narrowing.     L4-L5, there is diffuse disc bulge along with facet hypertrophy and ligamentum flavum hypertrophy.  There is superimposed central disc protrusion.  There is marked narrowing of the lateral recesses.  There is mild to moderate narrowing the spinal canal.  There is moderate bilateral neural foraminal narrowing.     L5-S1, there is diffuse disc bulge along with facet hypertrophy and ligamentum flavum.  The spinal canal is within normal limits.  There is moderate neural foraminal narrowing.     The paraspinal soft tissues are unremarkable.     Impression:     Changes of prior instrumentation in the lower lumbar spine at the L4-L5 level.  Unchanged appearance of soft tissue at the prior disc site at the L4-L5 level.     Mild to moderate narrowing of the spinal canal at the L4-L5 level.     Mild-to-moderate neural foraminal narrowing the lower lumbar spine as above.        Electronically signed by: Kevin Erickson MD  Date:                                            07/18/2023  Time:                                           09:04       Narrative & Impression    EXAMINATION:  XR CERVICAL SPINE AP LAT WITH FLEX EXTEN     CLINICAL HISTORY:  Other cervical disc degeneration, unspecified cervical  region     TECHNIQUE:  Three views of the cervical spine plus flexion and extension views were performed.     COMPARISON:  None     FINDINGS:  Cervical spine is normal in alignment, vertebral body heights are maintained.  No displaced fracture or subluxation.  No suspicious bone lesion.     No instability between the flexion and extension images.     Mild and moderate multilevel degenerative disc disease and uncovertebral DJD.  Anterior osteophytosis at levels C2-3, C4-5, and C5-6.  Mild multilevel facet DJD.     Unremarkable soft tissues.     Impression:     No acute abnormality.  No instability.     Mild and moderate multilevel DJD.        Electronically signed by: Marco Vásquez MD  Date:                                            08/18/2021  Time:                                           08:35           Narrative     EXAMINATION:  MRI LUMBAR SPINE WITHOUT CONTRAST    CLINICAL HISTORY:  lumbar ddd; Other intervertebral disc degeneration, lumbar region    TECHNIQUE:  Multiplanar, multisequence MR images were acquired from the thoracolumbar junction to the sacrum without the administration of contrast.    COMPARISON:  08/23/2017    FINDINGS:  There is no evidence of fracture or marrow replacement process.  There is disc desiccation at all lumbar levels with disc space narrowing at multiple levels but most significant at L4-5.  Sagittal alignment is maintained.  Conus terminates at T12-L1.  Visualized retroperitoneal structures demonstrate no significant abnormalities.    T12-L1, L1-L2: Mild diffuse disc bulge with no significant central canal stenosis or neural foraminal narrowing.    L2-L3: Mild diffuse disc bulge with moderate facet arthropathy.  No significant central canal stenosis or neural foraminal narrowing.    L3-4: Mild diffuse disc bulge extending into both neural foramen with moderate facet arthropathy causing moderate right and mild left neural foraminal narrowing.  No significant central canal  stenosis.    L4-5: Hemilaminectomy defect on the right.  There is a diffuse disc bulge extending into both neural foramen with a superimposed central extrusion with an annular fissure extending slightly below the level of the disc plane.  This causes mass effect on the lateral recess and may impinge on the descending nerve roots although the canal is decompressed posteriorly by the laminectomy defect with no significant central canal stenosis.  There is severe facet arthropathy contributing to severe bilateral neural foraminal narrowing.    L5-S1: There is severe facet arthropathy.  There is a diffuse disc bulge with no significant central canal stenosis.  There is severe bilateral neural foraminal narrowing..   Impression       Postoperative changes at L4 on the right with decompression of the central canal.  There is lumbar spondylosis most significant at L4-5 and L5-S1 where there is severe bilateral neural foraminal narrowing.  Specific details at each level are discussed above.      Electronically signed by: Quinton Goins MD  Date: 02/12/2019  Time: 10:10         Assessment:       No diagnosis found.    Plan:       There are no diagnoses linked to this encounter.    Driss Burttima Fermin. is a 72 y.o. male with worsening chronic bilateral low back pain.  Also with relatively new or symptoms concerning for right L3/L4 radicular pain.  No overt neurologic deficits noted on examination.  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  This is chronic.  Mild improvement following right L5 TF ANNIE.    Prior records reviewed.  Pertinent imaging studies reviewed by me. Imaging results were discussed with patient.  Schedule for right Infraneural L3, supraneural S1 transforaminal epidural steroid injection.  If limited benefit may consider referral to Neurosurgery in the future.  Although patient not overtly interested in exploring surgical options at this time.  Patient can continue with medications since they are  providing benefit, using them appropriately, and without adverse effects.  Return to clinic 2 weeks postprocedure to discuss efficacy.      Grant German PA-C  [facility]  Interventional Pain Management   09/21/2023    This note was created by combination of typed  and M-Modal dictation.  Transcription and phonetic errors may be present.  If there are any questions, please contact me.

## 2023-09-21 NOTE — PROGRESS NOTES
Mr. Naqvi will be scheduled for right L3 (infraneural) + S1 TF ANNIE on 10/11/2023.  Reviewed the pre-procedure instructions listed below with the patient- copy also provided.  Instructed patient to notify clinic if he begins feeling ill, runs a fever, is prescribed antibiotics, and/or has any outpatient procedures (colonoscopy, endoscopy, OBGYN, dental work, etc.), and/or any vaccinations or booster shots within the two weeks leading up to this procedure.  Instructed patient to report to [facility], 2nd Floor Endoscopy Registration Desk.  All questions answered- patient verbalized understanding. Clearance to hold Eliquis x3 and Plavix x5 previously received.    Day of Procedure  Ensure you have obtained an arrival time from the Pain Management Staff  You will be contacted the week before your scheduled date to review these instructions and receive your arrival time.  Please check any voicemails received.  If you arrive past your scheduled procedure time, you may be asked to reschedule your procedure.    Ensure you have a  with you.  If you arrive without a responsible adult to stay with you and drive you home, you may be asked to reschedule your procedure.     Take all of your prescribed medications that you routinely take for blood pressure, heart medications, thyroid, cholesterol, etc.  You will be informed if blood thinners should be held.    This is NOT a fasting procedure, you may eat the day of your procedure.    Wear comfortable clothing (loose fitting pants).  You may wear glasses, dentures, contact lenses, and/or hearing aids.     Notify the nurse during the intake process if you are allergic to any medications, if you are diabetic, or if you are not feeling well      Diabetic Patients  Please check your blood sugar prior to coming in for your procedure.  If the value is greater than 200, contact the clinic [phone] as the procedure may need to be rescheduled.  The procedure may  not be performed if your blood sugar is above 200 even after checking into the hospital.      IF you are taking blood thinners, please make sure our staff is aware so that we can obtain clearance and have you hold the medication accordingly.

## 2023-09-26 ENCOUNTER — OFFICE VISIT (OUTPATIENT)
Dept: ENDOCRINOLOGY | Facility: CLINIC | Age: 73
End: 2023-09-26
Payer: MEDICARE

## 2023-09-26 VITALS
SYSTOLIC BLOOD PRESSURE: 104 MMHG | DIASTOLIC BLOOD PRESSURE: 60 MMHG | HEART RATE: 41 BPM | WEIGHT: 176 LBS | TEMPERATURE: 99 F | BODY MASS INDEX: 27.57 KG/M2

## 2023-09-26 DIAGNOSIS — I25.10 CORONARY ARTERY DISEASE INVOLVING NATIVE CORONARY ARTERY OF NATIVE HEART WITHOUT ANGINA PECTORIS: ICD-10-CM

## 2023-09-26 DIAGNOSIS — N18.31 STAGE 3A CHRONIC KIDNEY DISEASE: ICD-10-CM

## 2023-09-26 DIAGNOSIS — E11.8 DIABETES MELLITUS TYPE 2 WITH COMPLICATIONS: Primary | ICD-10-CM

## 2023-09-26 PROCEDURE — 3044F HG A1C LEVEL LT 7.0%: CPT | Mod: CPTII,S$GLB,, | Performed by: NURSE PRACTITIONER

## 2023-09-26 PROCEDURE — 99214 PR OFFICE/OUTPT VISIT, EST, LEVL IV, 30-39 MIN: ICD-10-PCS | Mod: S$GLB,,, | Performed by: NURSE PRACTITIONER

## 2023-09-26 PROCEDURE — 99214 OFFICE O/P EST MOD 30 MIN: CPT | Mod: S$GLB,,, | Performed by: NURSE PRACTITIONER

## 2023-09-26 PROCEDURE — 3078F PR MOST RECENT DIASTOLIC BLOOD PRESSURE < 80 MM HG: ICD-10-PCS | Mod: CPTII,S$GLB,, | Performed by: NURSE PRACTITIONER

## 2023-09-26 PROCEDURE — 3074F PR MOST RECENT SYSTOLIC BLOOD PRESSURE < 130 MM HG: ICD-10-PCS | Mod: CPTII,S$GLB,, | Performed by: NURSE PRACTITIONER

## 2023-09-26 PROCEDURE — 99999 PR PBB SHADOW E&M-EST. PATIENT-LVL V: ICD-10-PCS | Mod: PBBFAC,,, | Performed by: NURSE PRACTITIONER

## 2023-09-26 PROCEDURE — 3061F PR NEG MICROALBUMINURIA RESULT DOCUMENTED/REVIEW: ICD-10-PCS | Mod: CPTII,S$GLB,, | Performed by: NURSE PRACTITIONER

## 2023-09-26 PROCEDURE — 3066F PR DOCUMENTATION OF TREATMENT FOR NEPHROPATHY: ICD-10-PCS | Mod: CPTII,S$GLB,, | Performed by: NURSE PRACTITIONER

## 2023-09-26 PROCEDURE — 3066F NEPHROPATHY DOC TX: CPT | Mod: CPTII,S$GLB,, | Performed by: NURSE PRACTITIONER

## 2023-09-26 PROCEDURE — 3008F BODY MASS INDEX DOCD: CPT | Mod: CPTII,S$GLB,, | Performed by: NURSE PRACTITIONER

## 2023-09-26 PROCEDURE — 1159F PR MEDICATION LIST DOCUMENTED IN MEDICAL RECORD: ICD-10-PCS | Mod: CPTII,S$GLB,, | Performed by: NURSE PRACTITIONER

## 2023-09-26 PROCEDURE — 3044F PR MOST RECENT HEMOGLOBIN A1C LEVEL <7.0%: ICD-10-PCS | Mod: CPTII,S$GLB,, | Performed by: NURSE PRACTITIONER

## 2023-09-26 PROCEDURE — 1159F MED LIST DOCD IN RCRD: CPT | Mod: CPTII,S$GLB,, | Performed by: NURSE PRACTITIONER

## 2023-09-26 PROCEDURE — 3008F PR BODY MASS INDEX (BMI) DOCUMENTED: ICD-10-PCS | Mod: CPTII,S$GLB,, | Performed by: NURSE PRACTITIONER

## 2023-09-26 PROCEDURE — 3061F NEG MICROALBUMINURIA REV: CPT | Mod: CPTII,S$GLB,, | Performed by: NURSE PRACTITIONER

## 2023-09-26 PROCEDURE — 99999 PR PBB SHADOW E&M-EST. PATIENT-LVL V: CPT | Mod: PBBFAC,,, | Performed by: NURSE PRACTITIONER

## 2023-09-26 PROCEDURE — 1160F PR REVIEW ALL MEDS BY PRESCRIBER/CLIN PHARMACIST DOCUMENTED: ICD-10-PCS | Mod: CPTII,S$GLB,, | Performed by: NURSE PRACTITIONER

## 2023-09-26 PROCEDURE — 1160F RVW MEDS BY RX/DR IN RCRD: CPT | Mod: CPTII,S$GLB,, | Performed by: NURSE PRACTITIONER

## 2023-09-26 PROCEDURE — 3078F DIAST BP <80 MM HG: CPT | Mod: CPTII,S$GLB,, | Performed by: NURSE PRACTITIONER

## 2023-09-26 PROCEDURE — 3074F SYST BP LT 130 MM HG: CPT | Mod: CPTII,S$GLB,, | Performed by: NURSE PRACTITIONER

## 2023-09-26 RX ORDER — GLIMEPIRIDE 1 MG/1
1 TABLET ORAL
Qty: 90 TABLET | Refills: 1 | Status: SHIPPED | OUTPATIENT
Start: 2023-09-26 | End: 2024-09-25

## 2023-09-26 RX ORDER — METFORMIN HYDROCHLORIDE 500 MG/1
TABLET, EXTENDED RELEASE ORAL
Qty: 270 TABLET | Refills: 2 | Status: SHIPPED | OUTPATIENT
Start: 2023-09-26 | End: 2024-01-30 | Stop reason: SDUPTHER

## 2023-09-26 NOTE — PROGRESS NOTES
CC: This 72 y.o. White male  is here for evaluation of  T2DM along with comorbidities indicated in the Visit Diagnosis section of this encounter.    HPI: Driss Hernandez Jr. was diagnosed with T2DM in ~ 2005.   Pt was under care of Dr. Agrawal and TARI Werner NP, previously.   Medical history: CAD s/p CABG, atrial fibrillation, CHF, CKD      Prior visit 5/2023  A1c is down from 7.2 to 6.6%.   He started Ozempic at 2 mg about 4-5 weeks ago. Doesn't appreciate any appetite suppression. Tolerating it well. However, he has been craving sweets more.   Doing better with reduced dose of metformin. Urgency greatly improved.   Plan Continue current medication regimen.   Test glucose 2x/day. New rx sent for glucose meter.   Order for Harrison 3 CGM also sent. Pt will contact clinic for education visit for training if it's affordable.   Avoid sweets. Discussed alternative healthier snacks like raw veggies and nuts.   Return to clinic in 4 months with labs prior    Interval hx  A1c is down from 6.6 to 6.4%.   6 lb weight loss since lov.     Never got a call from DME re: Harrison CGM. Reports he's eating more sweets but less carbs at meal.       LAST DIABETES EDUCATION: never --     HOSPITALIZED FOR DIABETES  -  No.    DIABETES MEDICATION HX:   ozempic started 10/2020  Novolin R switched to glimepiride in Jan 2021     PRESCRIBED DIABETES MEDICATIONS:   Ozempic 2 mg once weekly  (obtains from [facility])  Tresiba  20 units hs (obtains from [facility])  metformin  mg AM and 1000 mg PM   glimepiride 2 mg once daily around 3 pm before snack   Farxiga 5 mg daily       Misses medication doses - No      DM COMPLICATIONS: nephropathy, peripheral neuropathy, and cardiovascular disease    SELF MONITORING BLOOD GLUCOSE: Checks blood glucose at home 2-3x/day.                 HYPOGLYCEMIC EPISODES: symptoms start < 70, infrequent.          CURRENT DIET: drinks water, 2% milk ~ 2 cup/day. Eats mostly just lunch and  dinner. Lunch can be as late as 2 pm. Occasionally eats a protein bar in the AM.     drinks 2 cups coffee with milk with AS in AM     CURRENT EXERCISE:  None     SOCIAL: his daughter is a palliative care NP      /60   Pulse (!) 41   Temp 98.9 °F (37.2 °C)   Wt 79.8 kg (176 lb)   BMI 27.57 kg/m²       ROS:   CONSTITUTIONAL: Appetite good, denies fatigue  Denies n/v, constipation, or diarrhea.         PHYSICAL EXAM:  GENERAL: Well developed, well nourished. No acute distress.   PSYCH: AAOx3, appropriate mood and affect, conversant, well-groomed. Judgement and insight good.   NEURO: Cranial nerves grossly intact. Speech clear, no tremor.   CHEST: Respirations even and unlabored.         Hemoglobin A1C   Date Value Ref Range Status   09/19/2023 6.4 (H) 4.0 - 5.6 % Final     Comment:     ADA Screening Guidelines:  5.7-6.4%  Consistent with prediabetes  >or=6.5%  Consistent with diabetes    High levels of fetal hemoglobin interfere with the HbA1C  assay. Heterozygous hemoglobin variants (HbS, HgC, etc)do  not significantly interfere with this assay.   However, presence of multiple variants may affect accuracy.     05/22/2023 6.6 (H) 4.0 - 5.6 % Final     Comment:     ADA Screening Guidelines:  5.7-6.4%  Consistent with prediabetes  >or=6.5%  Consistent with diabetes    High levels of fetal hemoglobin interfere with the HbA1C  assay. Heterozygous hemoglobin variants (HbS, HgC, etc)do  not significantly interfere with this assay.   However, presence of multiple variants may affect accuracy.     02/02/2023 7.2 (H) 4.0 - 5.6 % Final     Comment:     ADA Screening Guidelines:  5.7-6.4%  Consistent with prediabetes  >or=6.5%  Consistent with diabetes    High levels of fetal hemoglobin interfere with the HbA1C  assay. Heterozygous hemoglobin variants (HbS, HgC, etc)do  not significantly interfere with this assay.   However, presence of multiple variants may affect accuracy.     08/29/2019 7.7 % Final     Comment:      Ania Werner             Component Value Date/Time     06/24/2023 2118    K 4.2 06/24/2023 2118     06/24/2023 2118    CO2 23 06/24/2023 2118    BUN 25 (H) 06/24/2023 2118    CREATININE 1.4 06/24/2023 2118    GLU 81 06/24/2023 2118    CALCIUM 9.4 06/24/2023 2118    ALKPHOS 29 (L) 06/24/2023 2118    AST 22 06/24/2023 2118    ALT 15 06/24/2023 2118    BILITOT 0.4 06/24/2023 2118    EGFRNORACEVR 53 (A) 06/24/2023 2118    ESTGFRAFRICA 53.0 (A) 06/09/2022 0826         Lab Results   Component Value Date    LDLCALC 92.2 06/16/2023      Latest Reference Range & Units 06/16/23 09:53   Cholesterol 120 - 199 mg/dL 141   HDL 40 - 75 mg/dL 28 (L)   HDL/Cholesterol Ratio 20.0 - 50.0 % 19.9 (L)   LDL Cholesterol External 63.0 - 159.0 mg/dL 92.2   Non-HDL Cholesterol mg/dL 113   Total Cholesterol/HDL Ratio 2.0 - 5.0  5.0   Triglycerides 30 - 150 mg/dL 104   (L): Data is abnormally low    Lab Results   Component Value Date    MICALBCREAT 23.8 05/22/2023             ASSESSMENT and PLAN:    A1C GOAL: < 7.5 %     1. Diabetes mellitus type 2 with complications  Reduce glimepiride to 1 mg once daily before lunch.   Will send orders out for Dexcom G7.   Return to clinic in 4 months with labs prior.     Hemoglobin A1C      2. Stage 3a chronic kidney disease  Continue Farxiga.       3. Coronary artery disease involving native coronary artery of native heart without angina pectoris  Continue Farxiga and Ozempic.   Maintain glucose control.                Orders Placed This Encounter   Procedures    Hemoglobin A1C     Standing Status:   Future     Standing Expiration Date:   11/24/2024          Follow up in about 4 months (around 1/26/2024).

## 2023-09-26 NOTE — PATIENT INSTRUCTIONS
Reduce glimepiride to 1 mg once daily before lunch.   Return to clinic in 4 months with labs prior.

## 2023-10-03 ENCOUNTER — TELEPHONE (OUTPATIENT)
Dept: PAIN MEDICINE | Facility: CLINIC | Age: 73
End: 2023-10-03
Payer: MEDICARE

## 2023-10-03 DIAGNOSIS — E55.9 HYPOVITAMINOSIS D: ICD-10-CM

## 2023-10-03 NOTE — TELEPHONE ENCOUNTER
Reviewed pre-procedure instructions with Cherise Driss, as well as provided arrival time of 7:45a for 10/11/2023.  All questions answered- verbalized understanding.    Instructed him to begin holding Plavix starting Friday 10/06/2023, and Eliquis starting Leonard 10/08/2023- verbalized understanding.

## 2023-10-04 RX ORDER — ERGOCALCIFEROL 1.25 MG/1
CAPSULE ORAL
Qty: 12 CAPSULE | Refills: 2 | Status: SHIPPED | OUTPATIENT
Start: 2023-10-04

## 2023-10-10 NOTE — DISCHARGE INSTRUCTIONS

## 2023-10-11 ENCOUNTER — HOSPITAL ENCOUNTER (OUTPATIENT)
Facility: HOSPITAL | Age: 73
Discharge: HOME | End: 2023-10-11
Attending: PAIN MEDICINE | Admitting: PAIN MEDICINE
Payer: MEDICARE

## 2023-10-11 VITALS
HEART RATE: 42 BPM | OXYGEN SATURATION: 98 % | TEMPERATURE: 98 F | DIASTOLIC BLOOD PRESSURE: 69 MMHG | RESPIRATION RATE: 17 BRPM | SYSTOLIC BLOOD PRESSURE: 141 MMHG

## 2023-10-11 DIAGNOSIS — M54.16 LUMBAR RADICULOPATHY: Primary | ICD-10-CM

## 2023-10-11 DIAGNOSIS — M54.50 LUMBAR PAIN: ICD-10-CM

## 2023-10-11 PROCEDURE — 64483 PR EPIDURAL INJ, ANES/STEROID, TRANSFORAMINAL, LUMB/SACR, SNGL LEVL: ICD-10-PCS | Mod: RT,,, | Performed by: PAIN MEDICINE

## 2023-10-11 PROCEDURE — 25000003 PHARM REV CODE 250: Performed by: PAIN MEDICINE

## 2023-10-11 PROCEDURE — 64483 NJX AA&/STRD TFRM EPI L/S 1: CPT | Mod: RT | Performed by: PAIN MEDICINE

## 2023-10-11 PROCEDURE — 64484 NJX AA&/STRD TFRM EPI L/S EA: CPT | Mod: RT,,, | Performed by: PAIN MEDICINE

## 2023-10-11 PROCEDURE — 63600175 PHARM REV CODE 636 W HCPCS: Performed by: PAIN MEDICINE

## 2023-10-11 PROCEDURE — 64484 PRA INJECT ANES/STEROID FORAMEN LUMBAR/SACRAL W IMG GUIDE ,EA ADD LEVEL: ICD-10-PCS | Mod: RT,,, | Performed by: PAIN MEDICINE

## 2023-10-11 PROCEDURE — 64484 NJX AA&/STRD TFRM EPI L/S EA: CPT | Mod: RT | Performed by: PAIN MEDICINE

## 2023-10-11 PROCEDURE — 25500020 PHARM REV CODE 255: Performed by: PAIN MEDICINE

## 2023-10-11 PROCEDURE — 64483 NJX AA&/STRD TFRM EPI L/S 1: CPT | Mod: RT,,, | Performed by: PAIN MEDICINE

## 2023-10-11 RX ORDER — ALPRAZOLAM 0.5 MG/1
1 TABLET, ORALLY DISINTEGRATING ORAL ONCE AS NEEDED
Status: DISCONTINUED | OUTPATIENT
Start: 2023-10-11 | End: 2023-10-11 | Stop reason: HOSPADM

## 2023-10-11 RX ORDER — LIDOCAINE HYDROCHLORIDE 10 MG/ML
INJECTION INFILTRATION; PERINEURAL
Status: DISCONTINUED
Start: 2023-10-11 | End: 2023-10-11 | Stop reason: HOSPADM

## 2023-10-11 RX ORDER — DEXAMETHASONE SODIUM PHOSPHATE 10 MG/ML
10 INJECTION INTRAMUSCULAR; INTRAVENOUS ONCE
Status: COMPLETED | OUTPATIENT
Start: 2023-10-11 | End: 2023-10-11

## 2023-10-11 RX ORDER — LIDOCAINE HYDROCHLORIDE 20 MG/ML
10 INJECTION, SOLUTION INFILTRATION; PERINEURAL ONCE
Status: DISCONTINUED | OUTPATIENT
Start: 2023-10-11 | End: 2023-10-11 | Stop reason: ALTCHOICE

## 2023-10-11 RX ORDER — DEXAMETHASONE SODIUM PHOSPHATE 10 MG/ML
INJECTION INTRAMUSCULAR; INTRAVENOUS
Status: DISCONTINUED
Start: 2023-10-11 | End: 2023-10-11 | Stop reason: HOSPADM

## 2023-10-11 RX ORDER — LIDOCAINE HYDROCHLORIDE 10 MG/ML
INJECTION, SOLUTION EPIDURAL; INFILTRATION; INTRACAUDAL; PERINEURAL
Status: DISCONTINUED
Start: 2023-10-11 | End: 2023-10-11 | Stop reason: HOSPADM

## 2023-10-11 RX ORDER — LIDOCAINE HYDROCHLORIDE 10 MG/ML
10 INJECTION INFILTRATION; PERINEURAL ONCE
Status: COMPLETED | OUTPATIENT
Start: 2023-10-11 | End: 2023-10-11

## 2023-10-11 RX ORDER — LIDOCAINE HYDROCHLORIDE 10 MG/ML
1 INJECTION, SOLUTION EPIDURAL; INFILTRATION; INTRACAUDAL; PERINEURAL ONCE
Status: COMPLETED | OUTPATIENT
Start: 2023-10-11 | End: 2023-10-11

## 2023-10-11 NOTE — PLAN OF CARE
Patient tolerated procedure well. Patient reports 2/10 pain after procedure. Assisted patient up for first time. Gait unsteady. Left leg weak. All discharge instructions given. Will continue to monitor.    0904: Left leg weakness unchanged, will continue to monitor.  0914: Left leg weakness unchanged, assisted from stretcher to chair, instructed patient not to ambulate without my assistance.  0928: Left leg weakness resolved, PRS 0/10.

## 2023-10-11 NOTE — OP NOTE
Lumbar transforaminal ANNIE    Time-out taken to identify patient and procedure side prior to starting the procedure.   I attest that I have reviewed the patient's home medications prior to the procedure and no contraindication have been identified. I  re-evaluated the patient after the patient was positioned for the procedure in the procedure room immediately before the procedural time-out. The vital signs are current and represent the current state of the patient which has not significantly changed since the preprocedure assessment.                              Date of Service: 10/11/2023    PCP: Jono Willoughby MD    Referring Physician:                                     PROCEDURE: Right L3 and S1 transforaminal epidural steroid injection under fluoroscopy    REASON FOR PROCEDURE: Right Lumbar radiculopathy [M54.16]  DDD (degenerative disc disease), lumbar [M51.36]    PHYSICIAN: Eze Byrd MD    ASSISTANTS:None    MEDICATIONS INJECTED:  Preservative-free dexamethasone 10mg, Xylocaine 1% MPF 3-5ml. 3ml per level. Preservative free, sterile normal saline is used to get larger volume as needed.    LOCAL ANESTHETIC INJECTED:  Xylocaine 1% 3ml per site.    SEDATION MEDICATIONS: None    ESTIMATED BLOOD LOSS:  None.    COMPLICATIONS:  None.    TECHNIQUE:   Laying in a prone position, the patient was prepped and draped in the usual sterile fashion using ChloraPrep and fenestrated drape.  The area to be injected was determined under fluoroscopic guidance.  Local anesthetic was given by raising a wheel and going down to the hub of a 27-gauge 1.25 inch needle.  A 3.5 inch 26-gauge spinal needle was introduced towards the right superior articular process of L4 and the right posterior S1 foramen. The needle was walked medially then hinged into the neural foramen.  Omnipaque was injected to confirm appropriate placement and that there was no vascular runoff.  The medication was then injected after applying negative  pressure. The patient tolerated the procedure well.    PAIN BEFORE THE PROCEDURE: 3/10.    PAIN AFTER THE PROCEDURE: 4/10.    The patient was monitored after the procedure.  Patient was given post procedure and discharge instructions to follow at home.  We will see the patient back in two weeks or the patient may call to inform of status. The patient was discharged in a stable condition.

## 2023-10-11 NOTE — DISCHARGE SUMMARY
[facility] - Endoscopy  Discharge Note  Short Stay    Procedure(s) (LRB):  Right L3 (infraneural) + S1 Transforaminal Epidural Steroid Injections (Right)      OUTCOME: Patient tolerated treatment/procedure well without complication and is now ready for discharge.    DISPOSITION: Home or Self Care    FINAL DIAGNOSIS:  <principal problem not specified>    FOLLOWUP: In clinic    DISCHARGE INSTRUCTIONS:  No discharge procedures on file.       Discharge Diagnosis:Lumbar radiculopathy [M54.16]  DDD (degenerative disc disease), lumbar [M51.36]  Condition on Discharge: Stable.  Diet on Discharge: Same as before.  Activity: as per instruction sheet.  Discharge to: Home with a responsible adult.  Follow up: as per Discharge instructions

## 2023-10-15 DIAGNOSIS — I25.119 CORONARY ARTERY DISEASE INVOLVING NATIVE CORONARY ARTERY OF NATIVE HEART WITH ANGINA PECTORIS: ICD-10-CM

## 2023-10-15 DIAGNOSIS — I10 ESSENTIAL HYPERTENSION: ICD-10-CM

## 2023-10-16 ENCOUNTER — TELEPHONE (OUTPATIENT)
Dept: GASTROENTEROLOGY | Facility: CLINIC | Age: 73
End: 2023-10-16
Payer: MEDICARE

## 2023-10-16 RX ORDER — HYDRALAZINE HYDROCHLORIDE 50 MG/1
50 TABLET, FILM COATED ORAL 2 TIMES DAILY
Qty: 180 TABLET | Refills: 3 | Status: SHIPPED | OUTPATIENT
Start: 2023-10-16 | End: 2024-10-15

## 2023-10-16 RX ORDER — CLOPIDOGREL BISULFATE 75 MG/1
75 TABLET ORAL DAILY
Qty: 90 TABLET | Refills: 3 | Status: SHIPPED | OUTPATIENT
Start: 2023-10-16

## 2023-10-16 RX ORDER — ISOSORBIDE MONONITRATE 60 MG/1
60 TABLET, EXTENDED RELEASE ORAL DAILY
Qty: 90 TABLET | Refills: 3 | Status: SHIPPED | OUTPATIENT
Start: 2023-10-16 | End: 2024-03-20 | Stop reason: SDUPTHER

## 2023-10-17 NOTE — TELEPHONE ENCOUNTER
No care due was identified.  [facility] Embedded Care Due Messages. Reference number: [id number].   10/17/2023 4:56:33 PM CDT

## 2023-10-18 RX ORDER — CELECOXIB 100 MG/1
100 CAPSULE ORAL DAILY
Qty: 30 CAPSULE | Refills: 6 | Status: SHIPPED | OUTPATIENT
Start: 2023-10-18 | End: 2024-10-02

## 2023-10-23 ENCOUNTER — TELEPHONE (OUTPATIENT)
Dept: GASTROENTEROLOGY | Facility: CLINIC | Age: 73
End: 2023-10-23
Payer: MEDICARE

## 2023-11-01 ENCOUNTER — OFFICE VISIT (OUTPATIENT)
Dept: PAIN MEDICINE | Facility: CLINIC | Age: 73
End: 2023-11-01
Payer: MEDICARE

## 2023-11-01 VITALS
HEART RATE: 71 BPM | HEIGHT: 67 IN | OXYGEN SATURATION: 99 % | WEIGHT: 175.5 LBS | DIASTOLIC BLOOD PRESSURE: 82 MMHG | SYSTOLIC BLOOD PRESSURE: 178 MMHG | BODY MASS INDEX: 27.55 KG/M2

## 2023-11-01 DIAGNOSIS — M51.36 DDD (DEGENERATIVE DISC DISEASE), LUMBAR: Primary | ICD-10-CM

## 2023-11-01 DIAGNOSIS — M54.16 LUMBAR RADICULOPATHY: ICD-10-CM

## 2023-11-01 DIAGNOSIS — M96.1 POSTLAMINECTOMY SYNDROME OF LUMBAR REGION: ICD-10-CM

## 2023-11-01 DIAGNOSIS — M79.18 MYOFASCIAL PAIN: ICD-10-CM

## 2023-11-01 DIAGNOSIS — M47.816 LUMBAR SPONDYLOSIS: ICD-10-CM

## 2023-11-01 PROCEDURE — 3066F NEPHROPATHY DOC TX: CPT | Mod: CPTII,S$GLB,,

## 2023-11-01 PROCEDURE — 3061F PR NEG MICROALBUMINURIA RESULT DOCUMENTED/REVIEW: ICD-10-PCS | Mod: CPTII,S$GLB,,

## 2023-11-01 PROCEDURE — 3077F PR MOST RECENT SYSTOLIC BLOOD PRESSURE >= 140 MM HG: ICD-10-PCS | Mod: CPTII,S$GLB,,

## 2023-11-01 PROCEDURE — 1160F PR REVIEW ALL MEDS BY PRESCRIBER/CLIN PHARMACIST DOCUMENTED: ICD-10-PCS | Mod: CPTII,S$GLB,,

## 2023-11-01 PROCEDURE — 3288F PR FALLS RISK ASSESSMENT DOCUMENTED: ICD-10-PCS | Mod: CPTII,S$GLB,,

## 2023-11-01 PROCEDURE — 99214 PR OFFICE/OUTPT VISIT, EST, LEVL IV, 30-39 MIN: ICD-10-PCS | Mod: S$GLB,,,

## 2023-11-01 PROCEDURE — 3044F HG A1C LEVEL LT 7.0%: CPT | Mod: CPTII,S$GLB,,

## 2023-11-01 PROCEDURE — 3008F PR BODY MASS INDEX (BMI) DOCUMENTED: ICD-10-PCS | Mod: CPTII,S$GLB,,

## 2023-11-01 PROCEDURE — 3079F PR MOST RECENT DIASTOLIC BLOOD PRESSURE 80-89 MM HG: ICD-10-PCS | Mod: CPTII,S$GLB,,

## 2023-11-01 PROCEDURE — 1159F PR MEDICATION LIST DOCUMENTED IN MEDICAL RECORD: ICD-10-PCS | Mod: CPTII,S$GLB,,

## 2023-11-01 PROCEDURE — 3288F FALL RISK ASSESSMENT DOCD: CPT | Mod: CPTII,S$GLB,,

## 2023-11-01 PROCEDURE — 1159F MED LIST DOCD IN RCRD: CPT | Mod: CPTII,S$GLB,,

## 2023-11-01 PROCEDURE — 3044F PR MOST RECENT HEMOGLOBIN A1C LEVEL <7.0%: ICD-10-PCS | Mod: CPTII,S$GLB,,

## 2023-11-01 PROCEDURE — 99999 PR PBB SHADOW E&M-EST. PATIENT-LVL V: CPT | Mod: PBBFAC,,,

## 2023-11-01 PROCEDURE — 99999 PR PBB SHADOW E&M-EST. PATIENT-LVL V: ICD-10-PCS | Mod: PBBFAC,,,

## 2023-11-01 PROCEDURE — 1100F PTFALLS ASSESS-DOCD GE2>/YR: CPT | Mod: CPTII,S$GLB,,

## 2023-11-01 PROCEDURE — 1125F PR PAIN SEVERITY QUANTIFIED, PAIN PRESENT: ICD-10-PCS | Mod: CPTII,S$GLB,,

## 2023-11-01 PROCEDURE — 3066F PR DOCUMENTATION OF TREATMENT FOR NEPHROPATHY: ICD-10-PCS | Mod: CPTII,S$GLB,,

## 2023-11-01 PROCEDURE — 1100F PR PT FALLS ASSESS DOC 2+ FALLS/FALL W/INJURY/YR: ICD-10-PCS | Mod: CPTII,S$GLB,,

## 2023-11-01 PROCEDURE — 3008F BODY MASS INDEX DOCD: CPT | Mod: CPTII,S$GLB,,

## 2023-11-01 PROCEDURE — 99214 OFFICE O/P EST MOD 30 MIN: CPT | Mod: S$GLB,,,

## 2023-11-01 PROCEDURE — 1125F AMNT PAIN NOTED PAIN PRSNT: CPT | Mod: CPTII,S$GLB,,

## 2023-11-01 PROCEDURE — 3061F NEG MICROALBUMINURIA REV: CPT | Mod: CPTII,S$GLB,,

## 2023-11-01 PROCEDURE — 3079F DIAST BP 80-89 MM HG: CPT | Mod: CPTII,S$GLB,,

## 2023-11-01 PROCEDURE — 3077F SYST BP >= 140 MM HG: CPT | Mod: CPTII,S$GLB,,

## 2023-11-01 PROCEDURE — 1160F RVW MEDS BY RX/DR IN RCRD: CPT | Mod: CPTII,S$GLB,,

## 2023-11-01 RX ORDER — DEXAMETHASONE SODIUM PHOSPHATE 10 MG/ML
INJECTION INTRAMUSCULAR; INTRAVENOUS
COMMUNITY
Start: 2023-08-02

## 2023-11-01 RX ORDER — IOHEXOL 300 MG/ML
INJECTION, SOLUTION INTRAVENOUS
COMMUNITY
Start: 2023-08-02

## 2023-11-01 NOTE — PROGRESS NOTES
Subjective:     Patient ID: Driss Hernandez Jr. is a 73 y.o. male    Chief Complaint: Low-back Pain, Shoulder Pain, Knee Pain, and Foot Pain      Referred by: No ref. provider found      HPI:    Interval History PA (11/01/2023):  Patient returns to clinic for follow up.  Patient is s/p right infraneural L3, S1 transforaminal epidural steroid injection done on 10/11/2023 with 80% relief of right-sided lower back and extremity pain.  Notes improvement started approximately 1 week following the procedure.  Notes pain in his right lower extremity has been overall manageable.  Denies any numbness, tingling, weakness, bowel or bladder dysfunction.  Patient does note a recent fall where he landed on his knees and feels he strained his lower back.  Notes diffuse increased pain across his lumbar paraspinal region.  Denies any radiation of pain.  Also with increased pain in his bilateral knees, abrasions and bruising noted.  Notes the incident occurred approximately 1 week ago and symptoms have been improving since.  Patient has been taking over-the-counter pain medication with benefit.    Interval History PA (09/21/2023):  Patient returns to clinic for follow up.  Patient notes some worsening of his chronic right-sided lower back and extremity pain.  Notes previous right L5 TF ANNIE done on August did provide some initial relief although short-lived.  States pain was manageable for a few weeks however has since been worsening.  Continues to endorse pain located in his right lumbar paraspinal region.  Majority of his pain is located from his right lateral hip radiating into his right anterior thigh, down to his knee.  Denies radiation distal to this point.  Denies any numbness, tingling, weakness, bowel bladder dysfunction.  Notes difficulty sleeping due to increased pain, worsened when lying supine.  Patient interested in proceeding with the previously discussed procedure.  States that he is not overtly interested in  exploring surgical options at this time.    Interval History PA (08/17/2023):  Patient returns to clinic for follow up.  Patient is s/p right L5 transforaminal epidural steroid injection done on 08/02/2023 reporting some initial relief with the 1st few days following the procedure.  Overall continued 30% relief.  Denies any changes in the quality or location of his pain.  States that he has been having minimal back pain, only notices a pressure sensation in his lower lumbar spine.  The majority of his pain is located between his right lateral hip into his anterior thigh stopping at the knee.  States his pain is intermittent, typically only present when getting out of bed, or standing up from a chair.  Notes minimal to no pain with activity or rest.  States symptoms still bothersome but overall tolerable at this time.  Denies any numbness, tingling, weakness, bowel or bladder dysfunction.    Interval History (7/18/23):  He returns today for follow up and MRI review.  He reports that his pain is unchanged in quality and location since last encounter.  He denies any new or worsening symptoms.      Interval History (7/14/23):  He returns today for follow up.  He reports that he has been having worsening low back and lower extremity pain.  States he continues to have bilateral low back pain as before though it has worsened since last encounter.  Also states that he is now having right-sided hip and thigh pain associated with his back pain.  These symptoms have been present for roughly 6 months.  This is typically present when sitting for prolonged periods of time.  States when he gets up he states that his thigh feels like it is being twisted.  The pain extends in a distribution consistent with the L3 dermatome.  Denies any associated paresthesias.  Does feel that the right lower extremity is weak when getting up after sitting though strength will return to normal after a few minutes.  Denies any associated bowel or  bladder dysfunction.      Interval History NP (5/17/2022):  Mr Hernandez presents for delayed FU. Returning lower back pain where TPIs have done well in past and requesting repeat. He will be having shoulder surgery upcoming. Denies new areas of pain or neurological changes. No focal voicing of  myelopathic symptoms such as handwriting changes or difficulty with buttons/coins/keys. Denies perineal paresthesias, bowel/bladder dysfunction. Currently taking neurontin and zanaflex being taken minimally at this time.       Interval History (9/22/21):  He returns today for follow up.  He reports that TPIs done at last visit have been helpful. His low back is doing well and does not need any further attention at this time. He does continue to have intermittent severe right shoulder pain. He denies any changes in the quality or location of this pain since last encounter. He denies any new or worsened symptoms.       Interval History (8/19/21):  He returns today for follow up.  He reports that physical therapy has been helpful for the right neck and shoulder pain.  He states that his neck pain was never very severe and has now essentially resolved.  He states that the vast majority of his pain is located right anterior shoulder.  The pain is exacerbated with certain movements and is particularly bothersome while sleeping.  He denies any associated numbness tingling with right shoulder pain.  Patient also states that he has recurrent low back pain.  Similar in quality and location to previous back pain that was well treated with trigger point injections.  He would like repeat trigger point injections done today      Interval History (6/4/21):  He returns today for follow up.  He reports that he has had right-sided neck and upper extremity pain for the past 2 weeks.  He states the pain started after handing his wife a heavy flower pot.  The pain is located the right lower cervical paraspinal region radiate to the right wrist  with associated numbness and tingling in the fingers of the right hand.  He denies any focal weakness or bowel bladder dysfunction.  The pain is constant worse with activity.  The pain disrupts his sleep.       Interval History (1/6/20):  He returns today for follow up.  He reports that lumbar trigger point injections have been helpful for the bilateral low back pain. He reports greater than 85% relief for over 9 months.  He states the pain has returned.  He denies any changes in the quality or location was pain. He would like repeat trigger point injections today.      Interval History (3/18/19):  He returns today for follow up.  He reports that trigger point injections have been helpful for the right-sided low back pain. He reports near complete resolution of the sharp right-sided low back pain. He does report that his previous low back pain starting to return.  He feels as though his current pain is exact same pain that was previously helped by sacroiliac joint injections.  He is interested in repeat injections if appropriate.      Interval History (2/12/19):  He returns today for follow up and imaging review.  He reports that his pain is unchanged since last encounter.  He denies any new or worsening neurologic symptoms.      Interval History (2/7/19):  He returns today for follow up.  He reports that he has been having acute right-sided low back pain for about 1 week.  He denies any specific inciting event or injury.  The pain is located in the lateral aspect of the right lumbosacral region.  It does not radiate.  He denies any associated numbness, tingling, weakness, bowel bladder dysfunction.  The pain varies in intensity from day-to-day.  The pain is much worse with coughing and sneezing and sitting with a forward flexed posture.  He is still able to go to the gym at times.      Interval History (12/17/18):  He returns today for follow up.  He reports that bilateral SIJ injections has been helpful for the  low back pain. He reports about 80% relief. He does not feel that he needs any further interventions at this time      Interval History (11/19/18):  He returns today for follow up.  He reports that bilateral lumbar medial branch blocks were not helpful. Pain is unchanged in quality and location since last visit. He denies any new symptoms.       Interval History (10/16/18):  He returns today for follow up.  He reports that he has continued to have bilateral low back pain. He still has some right lower extremity pain, but this is not as bothersome as his low back pain. The pain is located across the lower lumbar region R>L. It does not radiate. It is not associated with any numbness, tingling, weakness or b/b dysfunction. The pain is worse with activity. He also requests refills of tizanidine.      Interval History (12/18/17):  He returns today for follow up.  He reports that right L4 TFESI has been helpful for the right lumbar radicular pain. He reports 100% relief. He still has some lower back pain, but would prefer to discuss this later. Otherwise he is doing well.       Interval History (11/13/17):  He returns today for follow up.  He reports that PT has been helpful for the low back pain. He still has significant right foot and ankle pain when sitting in a reclined postion. This is the most painful area. He also has low back pain that is constant but does not bother him as much. Otherwise he is doing well.       Driss Christiansonshantal Akbar is a 73 y.o. male who presents today with chronic bilateral low back pain R>>>L. Pain started over 40 years ago. No inciting injury or event noted. Pain is located mainly on the right side of the lower lumbar paraspinals. About 5 weeks ago, patient began to have right lwoer extremity pain that he felt was related to his back pain, but this has subsided. He denies any numbness, tingling, weakness, or b/b dysfunction. The pain is described as dull, but can become sharp at times.  Patient states that his pain is worse in the morning. He does not sleep well. States that he wakes up every 45 minutes. Has taken Tizanidine PRN in the pat, but cannot recall how it affected him. Probably has not taken in over a year. Cannot take NSAIDs due to decreased renal function. Has taken in the past with mild relief.  This pain is described in detail below.    Physical Therapy:  Yes.    Non-pharmacologic Treatment: Nothing really helps         TENS? No    Pain Medications:         Currently taking: Gabapentin, Tizandine. Tylenol p.r.n., Celebrex    Has tried in the past:  NSAIDs    Has not tried: Opioids, Tylenol, TCAs, SNRIs, topical creams    Blood thinners:  Plavix, Eliquis    Interventional Therapies:   11/29/17 - Right L4 TFESI - 100% relief  10/28/18 - bilateral SIJ injections -  80% relief  11/14/18 - Bilateral L2, L3, L4 and L5 MBBs - No relief noted  3/20/19 - bilateral sacroiliac joint steroid injections  08/02/2023 - right L5 transforaminal epidural steroid injection - 30% relief  10/11/2023 - right L3 (infraneural), S1 TF ANNIE - 80% relief    Relevant Surgeries: Previous right L4 hemilaminectomy    Affecting sleep? Yes    Affecting daily activities? yes    Depressive symptoms? no          SI/HI? No    Work status: Retired    Pain Scores:    Best:       2/10  Worst:     10/10  Usually:   7/10  Today:    4/10    Pain Disability Index  Family/Home Responsibilities:: 5  Recreation:: 6  Social Activity:: 6  Occupation:: 6  Sexual Behavior:: 8  Self Care:: 5  Life-Support Activities:: 5  Pain Disability Index (PDI): 41(     Review of Systems   Constitutional:  Negative for activity change, appetite change, chills, fatigue, fever and unexpected weight change.   HENT:  Negative for hearing loss.    Eyes:  Negative for visual disturbance.   Respiratory:  Negative for chest tightness and shortness of breath.    Cardiovascular:  Negative for chest pain.   Gastrointestinal:  Negative for abdominal pain,  constipation, diarrhea, nausea and vomiting.   Genitourinary:  Negative for difficulty urinating.   Musculoskeletal:  Positive for back pain, gait problem and myalgias. Negative for neck pain.   Skin:  Negative for rash.   Neurological:  Positive for weakness. Negative for dizziness, light-headedness, numbness and headaches.   Psychiatric/Behavioral:  Positive for sleep disturbance. Negative for hallucinations and suicidal ideas. The patient is not nervous/anxious.        Past Medical History:   Diagnosis Date    Chronic heart failure with preserved ejection fraction 2/9/2023    Chronic midline low back pain without sciatica 10/2/2017    Colon polyps     Coronary artery disease involving native coronary artery of native heart 3/5/2020    Coronary artery disease involving native coronary artery of native heart with angina pectoris 12/10/2020    Diabetes mellitus with neurological manifestations, uncontrolled 1/24/2017    Diabetic polyneuropathy associated with type 2 diabetes mellitus 1/24/2017    Diabetic polyneuropathy associated with type 2 diabetes mellitus     Essential hypertension 1/24/2017    Gastroesophageal reflux disease 1/24/2017    Hyperlipidemia 1/24/2017    Insomnia 1/24/2017    Long-term insulin use 1/24/2017    Longstanding persistent atrial fibrillation 12/30/2020    Lumbar spondylosis 11/13/2017    Nuclear sclerosis of both eyes 8/24/2017    Obesity     PAD (peripheral artery disease) 3/23/2022    Stage 3 chronic kidney disease 2/13/2019    Uncontrolled type 2 diabetes mellitus without complication, with long-term current use of insulin 1/24/2017       Past Surgical History:   Procedure Laterality Date    ARTHROSCOPIC REPAIR OF ROTATOR CUFF OF SHOULDER Right 7/5/2022    Procedure: REPAIR, ROTATOR CUFF, ARTHROSCOPIC;  Surgeon: Valerie Olivera MD;  Location: [facility];  Service: Orthopedics;  Laterality: Right;  HETAL LEMUS [phone]/ TEXTED ALLAN ON 6/23/2022 @ 9:47AM. ALLAN RESPONDED  ON 6/23/2022 @ 10:33AMBREANNA BABIN [phone] MY BE HERE FOR CASE SPOKE TO HIM @ 9:59AM ON 7-1-2022  RN PREOP 6/28/2022    BACK SURGERY      2002    CATARACT EXTRACTION W/  INTRAOCULAR LENS IMPLANT Right 08/29/2017    Dr. Hong    CATARACT EXTRACTION W/  INTRAOCULAR LENS IMPLANT Left 02/24/2022    Dr. Hong    COLONOSCOPY N/A 2/21/2017    Procedure: COLONOSCOPY;  Surgeon: Robb Rosado MD;  Location: [facility] ENDO;  Service: Endoscopy;  Laterality: N/A;    COLONOSCOPY N/A 7/5/2023    Procedure: COLONOSCOPY;  Surgeon: Aston Varela MD;  Location: [facility] ENDO;  Service: Endoscopy;  Laterality: N/A;  Referral: JOVANNI SCANLON  ok to hold Plavix 5 days and Eliquis 2 days per Dr Purdy-GT  [facility]  Suprep   Inst portal   LW    CORONARY ANGIOGRAPHY INCLUDING BYPASS GRAFTS WITH CATHETERIZATION OF LEFT HEART N/A 11/11/2020    Procedure: ANGIOGRAM, CORONARY, INCLUDING BYPASS GRAFT, WITH LEFT HEART CATHETERIZATION;  Surgeon: Lane Cuellar MD;  Location: [facility] CATH LAB;  Service: Cardiology;  Laterality: N/A;  RN PRE OP 11-5-2020. --COVID NEGATIVE ON  11-. C A    CORONARY ARTERY BYPASS GRAFT      March 2016    EPIDURAL STEROID INJECTION Bilateral 11/14/2018    Procedure: Lumbar Medial Branch Blocks;  Surgeon: Eze Byrd Jr., MD;  Location: [facility];  Service: Pain Management;  Laterality: Bilateral;  Bilateral Lumbar Medial Branch Blocks L2, L3, L4, L5    05433  [id number]    Arrive @ 1150; NO Sedation    EPIDURAL STEROID INJECTION Bilateral 11/28/2018    Procedure: Injection, Steroid, Epidural;  Surgeon: Eze Byrd Jr., MD;  Location: [facility];  Service: Pain Management;  Laterality: Bilateral;  Bilateral Sacroiliac Joint Steroid Injections    95171    Arrive @ 0910    EPIDURAL STEROID INJECTION Bilateral 3/20/2019    Procedure: Injection, Steroid, Sacroiliiac Joint;  Surgeon: Eze Byrd Jr., MD;  Location: [facility];  Service: Pain Management;  Laterality: Bilateral;   Bilateral Sacroiliac Joint Steroid Injections    04911    Arrive @ 1145; Trigger point injections also?    EPIDURAL STEROID INJECTION Right 8/2/2023    Procedure: Right L5 Transforaminal Epidural Steroid Injection;  Surgeon: Eze Byrd Jr., MD;  Location: [facility] ENDO;  Service: Pain Management;  Laterality: Right;  @0830 (given)  Eliquis last 7/29  Plavix last 7/27  Check BG    EPIDURAL STEROID INJECTION Right 10/11/2023    Procedure: Right L3 (infraneural) + S1 Transforaminal Epidural Steroid Injections;  Surgeon: Eze Byrd Jr., MD;  Location: [facility] ENDO;  Service: Pain Management;  Laterality: Right;  @0745 (requests morning)  Eliquis 10/7 & Plavix 10/5  Check BG    INTRAOCULAR PROSTHESES INSERTION Left 2/24/2022    Procedure: INSERTION, IOL PROSTHESIS;  Surgeon: Nickolas Hong MD;  Location: [facility] OR;  Service: Ophthalmology;  Laterality: Left;    PHACOEMULSIFICATION OF CATARACT Left 2/24/2022    Procedure: PHACOEMULSIFICATION, CATARACT;  Surgeon: Nickolas Hong MD;  Location: [facility] OR;  Service: Ophthalmology;  Laterality: Left;  RN Phone Pre Op 2-16-22.  Covid NEGATIVE  2-23-22.  Arrival 08:00 am.    TONSILLECTOMY         Social History     Socioeconomic History    Marital status:    Tobacco Use    Smoking status: Never     Passive exposure: Never    Smokeless tobacco: Never   Substance and Sexual Activity    Alcohol use: Yes     Comment: rare occassion    Drug use: No    Sexual activity: Yes     Partners: Female     Social Determinants of Health     Financial Resource Strain: Low Risk  (10/29/2023)    Overall Financial Resource Strain (CARDIA)     Difficulty of Paying Living Expenses: Not hard at all   Food Insecurity: No Food Insecurity (10/29/2023)    Hunger Vital Sign     Worried About Running Out of Food in the Last Year: Never true     Ran Out of Food in the Last Year: Never true   Transportation Needs: No Transportation Needs (10/29/2023)    PRAPARE - Transportation      Lack of Transportation (Medical): No     Lack of Transportation (Non-Medical): No   Physical Activity: Insufficiently Active (10/29/2023)    Exercise Vital Sign     Days of Exercise per Week: 3 days     Minutes of Exercise per Session: 40 min   Stress: No Stress Concern Present (10/29/2023)    [employer] - Occupational Stress Questionnaire     Feeling of Stress : Only a little   Recent Concern: Stress - Stress Concern Present (9/20/2023)    [employer] - Occupational Stress Questionnaire     Feeling of Stress : To some extent   Social Connections: Moderately Integrated (10/29/2023)    Social Connection and Isolation Panel [NHANES]     Frequency of Communication with Friends and Family: More than three times a week     Frequency of Social Gatherings with Friends and Family: Three times a week     Attends [employer] Services: Never     Active Member of Clubs or Organizations: Yes     Attends Club or Organization Meetings: More than 4 times per year     Marital Status:    Housing Stability: Low Risk  (10/29/2023)    Housing Stability Vital Sign     Unable to Pay for Housing in the Last Year: No     Number of Places Lived in the Last Year: 1     Unstable Housing in the Last Year: No       Review of patient's allergies indicates:   Allergen Reactions    Penicillins Other (See Comments)     Unknown reaction, Had a reaction as a child    Shrimp Itching     Hand itching       Current Outpatient Medications on File Prior to Visit   Medication Sig Dispense Refill    albuterol (PROVENTIL/VENTOLIN HFA) 90 mcg/actuation inhaler Inhale 2 puffs into the lungs every 4 (four) hours as needed for Shortness of Breath. Rescue 17 g 3    apixaban (ELIQUIS) 5 mg Tab Take 1 tablet (5 mg total) by mouth 2 (two) times daily. 180 tablet 3    ascorbic acid, vitamin C, (VITAMIN C) 100 MG tablet Take 100 mg by mouth daily as needed.      atorvastatin (LIPITOR) 80 MG tablet Take  "1 tablet (80 mg total) by mouth every evening. 90 tablet 3    azelastine (ASTELIN) 137 mcg (0.1 %) nasal spray 1 spray (137 mcg total) by Nasal route 2 (two) times daily. 30 mL 3    b complex vitamins tablet Take 1 tablet by mouth once daily.      BD ALCOHOL SWABS PadM       BD ULTRA-FINE ROLAND PEN NEEDLES 32 gauge x 5/32" Ndle       blood sugar diagnostic Strp Check blood glucose 2 times a day. True metrix. E11.65 200 strip 3    blood-glucose meter kit Test glucose 2-3x/day. True metrix 1 each 0    celecoxib (CELEBREX) 100 MG capsule Take 1 capsule (100 mg total) by mouth once daily. 30 capsule 6    clopidogreL (PLAVIX) 75 mg tablet Take 1 tablet (75 mg total) by mouth once daily. 90 tablet 3    coenzyme Q10 100 mg capsule Take 100 mg by mouth once daily.      dapagliflozin (FARXIGA) 5 mg Tab tablet Take 1 tablet (5 mg total) by mouth once daily. 30 tablet 3    dexamethasone sodium phosp/PF (DEXAMETHASONE SODIUM PHOS, PF,) 10 mg/mL Soln       ergocalciferol (VITAMIN D2) 50,000 unit Cap TAKE ONE CAPSULE BY MOUTH WEEKLY 12 capsule 2    fenofibrate 160 MG Tab TAKE 1 TABLET EVERY MORNING 90 tablet 1    fluticasone propionate (FLONASE) 50 mcg/actuation nasal spray 2 sprays (100 mcg total) by Each Nostril route 2 (two) times daily. 18.2 mL 3    furosemide (LASIX) 20 MG tablet TAKE 1 TABLET TWICE DAILY 180 tablet 3    gabapentin (NEURONTIN) 100 MG capsule TAKE 2 CAPSULES EVERY MORNING AND TAKE 3 CAPSULES EVERY DAY WITH DINNER 450 capsule 12    glimepiride (AMARYL) 1 MG tablet Take 1 tablet (1 mg total) by mouth before breakfast. 90 tablet 1    hydrALAZINE (APRESOLINE) 50 MG tablet Take 1 tablet (50 mg total) by mouth 2 (two) times a day. 180 tablet 3    insulin degludec (TRESIBA FLEXTOUCH U-100) 100 unit/mL (3 mL) insulin pen Inject 20 Units into the skin once daily. 30 mL 4    isosorbide mononitrate (IMDUR) 60 MG 24 hr tablet Take 1 tablet (60 mg total) by mouth once daily. 90 tablet 11    isosorbide mononitrate (IMDUR) " "60 MG 24 hr tablet Take 1 tablet (60 mg total) by mouth once daily. 90 tablet 3    ketoconazole (NIZORAL) 2 % cream Apply topically once daily. 60 g 6    lancets Misc Check blood glucose 2x/day. True metrix. E11.65 200 each 3    magnesium 250 mg Tab Take 1 tablet by mouth once daily.       metFORMIN (GLUCOPHAGE-XR) 500 MG ER 24hr tablet Take 1 tablet with breakfast and 2 tablets with supper. 270 tablet 2    nitroGLYCERIN (NITROSTAT) 0.4 MG SL tablet Place 1 tablet (0.4 mg total) under the tongue every 5 (five) minutes as needed for Chest pain. 25 tablet 11    omega-3 acid ethyl esters (LOVAZA) 1 gram capsule Take 2 capsules (2 g total) by mouth 2 (two) times daily. 360 capsule 3    OMNIPAQUE 300 300 mg iodine/mL injection       pantoprazole (PROTONIX) 40 MG tablet TAKE 1 TABLET EVERY DAY 90 tablet 3    semaglutide (OZEMPIC) 2 mg/dose (8 mg/3 mL) PnIj Inject 2 mg into the skin every 7 days. 4 each 3    tiZANidine (ZANAFLEX) 4 MG tablet Take 1 tablet (4 mg total) by mouth every 6 (six) hours as needed (pain). 360 tablet 3    triazolam (HALCION) 0.25 MG Tab TAKE 1 TABLET BY MOUTH EVERY NIGHT AT BEDTIME AS NEEDED 90 tablet 5    TRUEPLUS LANCETS 33 gauge [facility]        Current Facility-Administered Medications on File Prior to Visit   Medication Dose Route Frequency Provider Last Rate Last Admin    cyclopentolate 1% ophthalmic solution 1 drop  1 drop Left Eye On Call Procedure Nickolas Hong MD   1 drop at 02/24/22 0901    ofloxacin 0.3 % ophthalmic solution 1 drop  1 drop Left Eye On Call Procedure Nickolas Hong MD   2 drop at 02/24/22 1017    sodium chloride 0.9% flush 10 mL  10 mL Intravenous PRN Nickolas Hong MD        triamcinolone acetonide injection 40 mg  40 mg Intra-articular  Valerie Olivera MD   40 mg at 02/09/23 1030       Objective:      BP (!) 194/88 (BP Location: Right arm, Patient Position: Sitting, BP Method: Large (Automatic))   Pulse 71   Ht 5' 7" (1.702 m)   Wt 79.6 kg " (175 lb 7.8 oz)   SpO2 99%   BMI 27.49 kg/m²     Exam:  GEN:  Well developed, well nourished.  No acute distress.  Normal pain behavior.  HEENT:  No trauma.  Mucous membranes moist.  Nares patent bilaterally.  PSYCH: Normal affect. Thought content appropriate.  CHEST:  Breathing symmetric.  No audible wheezing.  ABD: Soft, non-distended.  SKIN:  Warm, pink, dry.  No rash on exposed areas.    EXT:  No cyanosis, clubbing, or edema.  No color change or changes in nail or hair growth.  NEURO/MUSCULOSKELETAL:  Fully alert, oriented, and appropriate. Speech normal ivania. No cranial nerve deficits.   Gait:  Antalgic.  No trendelenburg sign bilaterally.   Motor Strength:  5/5 motor strength throughout lower extremities.   Sensory:  No sensory deficit in the lower extremities.   L-Spine:  Limited ROM with pain on extension.  Negative pain with axial/facet loading bilaterally.  Negative SLR bilaterally.    Diffusely TTP over bilateral upper and lower lumbar paraspinals    Knee:  Full active ROM without pain  Bruising and abrasion noted to bilateral knees  TTP along the medial joint line of the left knee  TTP along lateral joint line of right knee      Imaging:    Narrative & Impression    EXAMINATION:  MRI LUMBAR SPINE WITHOUT CONTRAST     CLINICAL HISTORY:  Lumbar radiculopathy, symptoms persist with conservative treatment; Spondylosis without myelopathy or radiculopathy, lumbar region     TECHNIQUE:  Multiplanar, multisequence MR images were acquired from the thoracolumbar junction to the sacrum without the administration of contrast.     COMPARISON:  02/11/2019.     FINDINGS:  There is susceptibility weighted artifact within the midline lower lumbar spine, suggestive of prior instrumentation.     The lumbar alignment is within normal limits.  There are scattered Schmorl's nodes.  The vertebral body heights are maintained.  The bone marrow signal is within normal limits.     The conus terminates at the level of T12.   The cauda equina nerve roots are within normal limits.     There is multilevel disc desiccation.  Evaluation of the individual disc levels reveals the following:     L1-L2, there is a disc bulge along with facet hypertrophy and ligamentum flavum hypertrophy.  The spinal canal and neural foramina are unremarkable.     L2-L3, there is a disc bulge along with facet hypertrophy and ligamentum flavum hypertrophy.  The spinal canal and neural foramina are unremarkable There is small amount of fluid within the facet joints.     L3-L4, there is diffuse disc bulge along with facet hypertrophy and ligamentum flavum.  The spinal canal is within normal limits.  There is mild spinal canal narrowing.     L4-L5, there is diffuse disc bulge along with facet hypertrophy and ligamentum flavum hypertrophy.  There is superimposed central disc protrusion.  There is marked narrowing of the lateral recesses.  There is mild to moderate narrowing the spinal canal.  There is moderate bilateral neural foraminal narrowing.     L5-S1, there is diffuse disc bulge along with facet hypertrophy and ligamentum flavum.  The spinal canal is within normal limits.  There is moderate neural foraminal narrowing.     The paraspinal soft tissues are unremarkable.     Impression:     Changes of prior instrumentation in the lower lumbar spine at the L4-L5 level.  Unchanged appearance of soft tissue at the prior disc site at the L4-L5 level.     Mild to moderate narrowing of the spinal canal at the L4-L5 level.     Mild-to-moderate neural foraminal narrowing the lower lumbar spine as above.        Electronically signed by: Kevin Erickson MD  Date:                                            07/18/2023  Time:                                           09:04       Narrative & Impression    EXAMINATION:  XR CERVICAL SPINE AP LAT WITH FLEX EXTEN     CLINICAL HISTORY:  Other cervical disc degeneration, unspecified cervical region     TECHNIQUE:  Three views of the  cervical spine plus flexion and extension views were performed.     COMPARISON:  None     FINDINGS:  Cervical spine is normal in alignment, vertebral body heights are maintained.  No displaced fracture or subluxation.  No suspicious bone lesion.     No instability between the flexion and extension images.     Mild and moderate multilevel degenerative disc disease and uncovertebral DJD.  Anterior osteophytosis at levels C2-3, C4-5, and C5-6.  Mild multilevel facet DJD.     Unremarkable soft tissues.     Impression:     No acute abnormality.  No instability.     Mild and moderate multilevel DJD.        Electronically signed by: Marco Vásquez MD  Date:                                            08/18/2021  Time:                                           08:35           Narrative     EXAMINATION:  MRI LUMBAR SPINE WITHOUT CONTRAST    CLINICAL HISTORY:  lumbar ddd; Other intervertebral disc degeneration, lumbar region    TECHNIQUE:  Multiplanar, multisequence MR images were acquired from the thoracolumbar junction to the sacrum without the administration of contrast.    COMPARISON:  08/23/2017    FINDINGS:  There is no evidence of fracture or marrow replacement process.  There is disc desiccation at all lumbar levels with disc space narrowing at multiple levels but most significant at L4-5.  Sagittal alignment is maintained.  Conus terminates at T12-L1.  Visualized retroperitoneal structures demonstrate no significant abnormalities.    T12-L1, L1-L2: Mild diffuse disc bulge with no significant central canal stenosis or neural foraminal narrowing.    L2-L3: Mild diffuse disc bulge with moderate facet arthropathy.  No significant central canal stenosis or neural foraminal narrowing.    L3-4: Mild diffuse disc bulge extending into both neural foramen with moderate facet arthropathy causing moderate right and mild left neural foraminal narrowing.  No significant central canal stenosis.    L4-5: Hemilaminectomy defect on the  right.  There is a diffuse disc bulge extending into both neural foramen with a superimposed central extrusion with an annular fissure extending slightly below the level of the disc plane.  This causes mass effect on the lateral recess and may impinge on the descending nerve roots although the canal is decompressed posteriorly by the laminectomy defect with no significant central canal stenosis.  There is severe facet arthropathy contributing to severe bilateral neural foraminal narrowing.    L5-S1: There is severe facet arthropathy.  There is a diffuse disc bulge with no significant central canal stenosis.  There is severe bilateral neural foraminal narrowing..   Impression       Postoperative changes at L4 on the right with decompression of the central canal.  There is lumbar spondylosis most significant at L4-5 and L5-S1 where there is severe bilateral neural foraminal narrowing.  Specific details at each level are discussed above.      Electronically signed by: Quinton Goins MD  Date: 02/12/2019  Time: 10:10         Assessment:       Encounter Diagnoses   Name Primary?    DDD (degenerative disc disease), lumbar Yes    Lumbar radiculopathy     Lumbar spondylosis     Postlaminectomy syndrome of lumbar region     Myofascial pain        Plan:       Driss was seen today for low-back pain, shoulder pain, knee pain and foot pain.    Diagnoses and all orders for this visit:    DDD (degenerative disc disease), lumbar    Lumbar radiculopathy    Lumbar spondylosis    Postlaminectomy syndrome of lumbar region    Myofascial pain        Driss Gomez Mary Akbar is a 73 y.o. male with worsening chronic bilateral low back pain.  Also with relatively new or symptoms concerning for right L3/L4 radicular pain.  No overt neurologic deficits noted on examination.  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  This is chronic.  Mild improvement following right L5 TF ANNIE.  Significant improvement with right L3 infraneural, S1 TF  ANNIE.  Today with acute pain following recent incident.  Pain appears to be mostly myofascial possibly related to acute muscle strain.  Notes improvement since incident.    Prior records reviewed.  Pertinent imaging studies reviewed by me. Imaging results were discussed with patient.  Patient is s/p right L3 infraneural, S1 transforaminal epidural steroid injection with 80% relief of lower back and extremity pain.  Patient notes improvement with ADLs.  Patient with acute bilateral lower back pain following recent fall.  Appears to be mostly myofascial possibly related to an acute muscle strain.  Discussed continuing with conservative measures.  Patient notes overall improvement since the incident.  May consider trigger point injections throughout bilateral lumbar paraspinals if pain persists/worsens.  Patient can continue with current medications since they are providing benefit, using them appropriately, and without adverse effects.  Patient with elevated blood pressure today's visit.  Denies any vision changes, nausea, vomiting, dizziness.  Advised to follow up with PCP regarding this.  Return to clinic as needed.        Grant German PA-C  [facility]  Interventional Pain Management   11/01/2023    This note was created by combination of typed  and M-Modal dictation.  Transcription and phonetic errors may be present.  If there are any questions, please contact me.

## 2023-11-16 ENCOUNTER — TELEPHONE (OUTPATIENT)
Dept: PHARMACY | Facility: CLINIC | Age: 73
End: 2023-11-16
Payer: MEDICARE

## 2023-11-16 NOTE — TELEPHONE ENCOUNTER
Hello,       A [employer] Re-Enrollment Patient Assistance Application for Driss Hernandez JrCherise - MRN  [id number] was faxed to your office @ [phone]  Please have MERLIN Guajardo  review the application to ensure the prescription is correct. If correct, sign and fax the application back to the Pharmacy Patient Assistance Team @[phone].      If changes need to be made to this application, please let me know so corrections can be made, and the application will be faxed back to you for approval and signature.

## 2023-11-16 NOTE — TELEPHONE ENCOUNTER
The signed and completed patient assistance application was received from the providers office and  has been submitted to [employer] ([facility])for consideration of eligibility.  The signed and completed patient assistance application was received from the providers office and  has been submitted to [employer]([facility]) for consideration of eligibility.

## 2023-11-24 NOTE — PATIENT INSTRUCTIONS
Need for prophylactic measure Will hold off on visit to dietician since patient believes that he knows what to do but has trouble maintaining restrictions.    He will hold off on this time on starting personal  Continuous Glucose Monitoring (CGM) like the Harrison and Ozempic due to cost. He is willing however to undergo CGM study through the clinic primarily to rule out Orly Phenomenon.     Increase Lantus from 26 to 30 units once daily.   Increase Novolin R to 11 units before each meal with sliding scale.   Continue metformin.     Test glucose 4x/day.     Follow up for CGM review.            Need for prophylactic measure Need for prophylactic measure Need for prophylactic measure Need for prophylactic measure Need for prophylactic measure Need for prophylactic measure

## 2023-11-30 DIAGNOSIS — E78.2 MIXED HYPERLIPIDEMIA: ICD-10-CM

## 2023-11-30 RX ORDER — OMEGA-3-ACID ETHYL ESTERS 1 G/1
2 CAPSULE, LIQUID FILLED ORAL 2 TIMES DAILY
Qty: 360 CAPSULE | Refills: 3 | Status: SHIPPED | OUTPATIENT
Start: 2023-11-30 | End: 2024-02-06 | Stop reason: SDUPTHER

## 2023-11-30 NOTE — TELEPHONE ENCOUNTER
No care due was identified.  [facility] Embedded Care Due Messages. Reference number: [id number].   11/30/2023 2:03:44 PM CST

## 2023-12-04 ENCOUNTER — TELEPHONE (OUTPATIENT)
Dept: GASTROENTEROLOGY | Facility: CLINIC | Age: 73
End: 2023-12-04
Payer: MEDICARE

## 2023-12-05 ENCOUNTER — ANESTHESIA EVENT (OUTPATIENT)
Dept: ENDOSCOPY | Facility: HOSPITAL | Age: 73
End: 2023-12-05
Payer: MEDICARE

## 2023-12-06 ENCOUNTER — ANESTHESIA (OUTPATIENT)
Dept: ENDOSCOPY | Facility: HOSPITAL | Age: 73
End: 2023-12-06
Payer: MEDICARE

## 2023-12-06 ENCOUNTER — HOSPITAL ENCOUNTER (OUTPATIENT)
Facility: HOSPITAL | Age: 73
Discharge: HOME | End: 2023-12-06
Attending: INTERNAL MEDICINE | Admitting: INTERNAL MEDICINE
Payer: MEDICARE

## 2023-12-06 VITALS
RESPIRATION RATE: 20 BRPM | HEART RATE: 48 BPM | SYSTOLIC BLOOD PRESSURE: 161 MMHG | DIASTOLIC BLOOD PRESSURE: 70 MMHG | TEMPERATURE: 98 F | OXYGEN SATURATION: 100 %

## 2023-12-06 DIAGNOSIS — R10.9 ABDOMINAL PAIN: ICD-10-CM

## 2023-12-06 DIAGNOSIS — D50.8 OTHER IRON DEFICIENCY ANEMIA: Primary | ICD-10-CM

## 2023-12-06 LAB — POCT GLUCOSE: 103 MG/DL (ref 70–110)

## 2023-12-06 PROCEDURE — 82962 GLUCOSE BLOOD TEST: CPT | Performed by: INTERNAL MEDICINE

## 2023-12-06 PROCEDURE — 88305 TISSUE EXAM BY PATHOLOGIST: CPT | Mod: 26,,, | Performed by: PATHOLOGY

## 2023-12-06 PROCEDURE — D9220A PRA ANESTHESIA: ICD-10-PCS | Mod: CRNA,,, | Performed by: NURSE ANESTHETIST, CERTIFIED REGISTERED

## 2023-12-06 PROCEDURE — 88305 TISSUE EXAM BY PATHOLOGIST: CPT | Mod: 59 | Performed by: PATHOLOGY

## 2023-12-06 PROCEDURE — 43239 PR EGD, FLEX, W/BIOPSY, SGL/MULTI: ICD-10-PCS | Mod: ,,, | Performed by: INTERNAL MEDICINE

## 2023-12-06 PROCEDURE — D9220A PRA ANESTHESIA: Mod: CRNA,,, | Performed by: NURSE ANESTHETIST, CERTIFIED REGISTERED

## 2023-12-06 PROCEDURE — 27201012 HC FORCEPS, HOT/COLD, DISP: Performed by: INTERNAL MEDICINE

## 2023-12-06 PROCEDURE — 88305 TISSUE EXAM BY PATHOLOGIST: ICD-10-PCS | Mod: 26,,, | Performed by: PATHOLOGY

## 2023-12-06 PROCEDURE — 37000009 HC ANESTHESIA EA ADD 15 MINS: Performed by: INTERNAL MEDICINE

## 2023-12-06 PROCEDURE — 88342 CHG IMMUNOCYTOCHEMISTRY: ICD-10-PCS | Mod: 26,,, | Performed by: PATHOLOGY

## 2023-12-06 PROCEDURE — 43239 EGD BIOPSY SINGLE/MULTIPLE: CPT | Performed by: INTERNAL MEDICINE

## 2023-12-06 PROCEDURE — 43239 EGD BIOPSY SINGLE/MULTIPLE: CPT | Mod: ,,, | Performed by: INTERNAL MEDICINE

## 2023-12-06 PROCEDURE — 37000008 HC ANESTHESIA 1ST 15 MINUTES: Performed by: INTERNAL MEDICINE

## 2023-12-06 PROCEDURE — 63600175 PHARM REV CODE 636 W HCPCS: Performed by: NURSE ANESTHETIST, CERTIFIED REGISTERED

## 2023-12-06 PROCEDURE — 25000003 PHARM REV CODE 250: Performed by: NURSE ANESTHETIST, CERTIFIED REGISTERED

## 2023-12-06 PROCEDURE — 88342 IMHCHEM/IMCYTCHM 1ST ANTB: CPT | Mod: 26,,, | Performed by: PATHOLOGY

## 2023-12-06 PROCEDURE — 25000003 PHARM REV CODE 250: Performed by: ANESTHESIOLOGY

## 2023-12-06 PROCEDURE — D9220A PRA ANESTHESIA: ICD-10-PCS | Mod: ANES,,, | Performed by: ANESTHESIOLOGY

## 2023-12-06 PROCEDURE — D9220A PRA ANESTHESIA: Mod: ANES,,, | Performed by: ANESTHESIOLOGY

## 2023-12-06 RX ORDER — LIDOCAINE HYDROCHLORIDE 40 MG/ML
SOLUTION TOPICAL
Status: DISCONTINUED | OUTPATIENT
Start: 2023-12-06 | End: 2023-12-06

## 2023-12-06 RX ORDER — PROPOFOL 10 MG/ML
VIAL (ML) INTRAVENOUS
Status: DISCONTINUED | OUTPATIENT
Start: 2023-12-06 | End: 2023-12-06

## 2023-12-06 RX ORDER — LIDOCAINE HYDROCHLORIDE 20 MG/ML
INJECTION INTRAVENOUS
Status: DISCONTINUED | OUTPATIENT
Start: 2023-12-06 | End: 2023-12-06

## 2023-12-06 RX ORDER — LIDOCAINE HYDROCHLORIDE 10 MG/ML
1 INJECTION, SOLUTION EPIDURAL; INFILTRATION; INTRACAUDAL; PERINEURAL ONCE
Status: DISCONTINUED | OUTPATIENT
Start: 2023-12-06 | End: 2023-12-06 | Stop reason: HOSPADM

## 2023-12-06 RX ORDER — PROPOFOL 10 MG/ML
INJECTION, EMULSION INTRAVENOUS
Status: DISCONTINUED
Start: 2023-12-06 | End: 2023-12-06 | Stop reason: HOSPADM

## 2023-12-06 RX ORDER — SODIUM CHLORIDE 9 MG/ML
INJECTION, SOLUTION INTRAVENOUS CONTINUOUS
Status: DISCONTINUED | OUTPATIENT
Start: 2023-12-06 | End: 2023-12-06 | Stop reason: HOSPADM

## 2023-12-06 RX ORDER — LIDOCAINE HYDROCHLORIDE 20 MG/ML
INJECTION, SOLUTION EPIDURAL; INFILTRATION; INTRACAUDAL; PERINEURAL
Status: DISCONTINUED
Start: 2023-12-06 | End: 2023-12-06 | Stop reason: HOSPADM

## 2023-12-06 RX ADMIN — LIDOCAINE HYDROCHLORIDE 1 ML: 40 SOLUTION TOPICAL at 10:12

## 2023-12-06 RX ADMIN — PROPOFOL 60 MG: 10 INJECTION, EMULSION INTRAVENOUS at 10:12

## 2023-12-06 RX ADMIN — LIDOCAINE HYDROCHLORIDE 50 MG: 20 INJECTION, SOLUTION INTRAVENOUS at 10:12

## 2023-12-06 RX ADMIN — SODIUM CHLORIDE: 9 INJECTION, SOLUTION INTRAVENOUS at 10:12

## 2023-12-06 RX ADMIN — PROPOFOL 20 MG: 10 INJECTION, EMULSION INTRAVENOUS at 10:12

## 2023-12-06 NOTE — TRANSFER OF CARE
Anesthesia Transfer of Care Note    Patient: Driss Hernandez JrCherise    Procedure(s) Performed: Procedure(s) (LRB):  EGD (ESOPHAGOGASTRODUODENOSCOPY) (N/A)    Patient location: GI    Anesthesia Type: general    Transport from OR: Transported from OR on room air with adequate spontaneous ventilation    Post pain: adequate analgesia    Post assessment: no apparent anesthetic complications and tolerated procedure well    Post vital signs: stable    Level of consciousness: awake    Nausea/Vomiting: no nausea/vomiting    Complications: none    Transfer of care protocol was followed    Last vitals: Visit Vitals  BP (!) 114/59 (BP Location: Left arm, Patient Position: Lying)   Pulse (!) 47   Temp 36.4 °C (97.5 °F) (Oral)   Resp 16   SpO2 100%

## 2023-12-06 NOTE — H&P
Chief complaints:Iron deficient anemia    History of Presenting Illness   Patient has a history of iron deficiency. He has also been having pica.   He has had a recent colonoscopy.  No other complaints.    Review of Systems   Constitutional: Negative for fever and appetite change.   HENT: Negative for sore throat, trouble swallowing and neck pain.   Eyes: Negative for photophobia and visual disturbance.   Respiratory: Negative for wheezing.   Cardiovascular: Negative for chest pain and palpitations.   Gastrointestinal: See HPI for details   Musculoskeletal: Negative for joint swelling and arthralgias.   Skin: Negative for rash and wound.   Neurological: Negative for dizziness, tremors and weakness.   Hematological: Negative.   Psychiatric/Behavioral: Negative for suicidal ideas and behavioral problems.     Past Medical History:   Diagnosis Date    Chronic heart failure with preserved ejection fraction 2/9/2023    Chronic midline low back pain without sciatica 10/2/2017    Colon polyps     Coronary artery disease involving native coronary artery of native heart 3/5/2020    Coronary artery disease involving native coronary artery of native heart with angina pectoris 12/10/2020    Diabetes mellitus with neurological manifestations, uncontrolled 1/24/2017    Diabetic polyneuropathy associated with type 2 diabetes mellitus 1/24/2017    Diabetic polyneuropathy associated with type 2 diabetes mellitus     Essential hypertension 1/24/2017    Gastroesophageal reflux disease 1/24/2017    Hyperlipidemia 1/24/2017    Insomnia 1/24/2017    Long-term insulin use 1/24/2017    Longstanding persistent atrial fibrillation 12/30/2020    Lumbar spondylosis 11/13/2017    Nuclear sclerosis of both eyes 8/24/2017    Obesity     PAD (peripheral artery disease) 3/23/2022    Stage 3 chronic kidney disease 2/13/2019    Uncontrolled type 2 diabetes mellitus without complication, with long-term current use of insulin 1/24/2017       Past  Surgical History:   Procedure Laterality Date    ARTHROSCOPIC REPAIR OF ROTATOR CUFF OF SHOULDER Right 7/5/2022    Procedure: REPAIR, ROTATOR CUFF, ARTHROSCOPIC;  Surgeon: Valerie Olivera MD;  Location: [facility] OR;  Service: Orthopedics;  Laterality: Right;  HETAL LEMUS [phone]/ TEXTED ALLAN ON 6/23/2022 @ 9:47AM. ALLAN RESPONDED ON 6/23/2022 @ 10:33AM-BREANNA BABIN [phone] MY BE HERE FOR CASE SPOKE TO HIM @ 9:59AM ON 7-1-2022  RN PREOP 6/28/2022    BACK SURGERY      2002    CATARACT EXTRACTION W/  INTRAOCULAR LENS IMPLANT Right 08/29/2017    Dr. Hong    CATARACT EXTRACTION W/  INTRAOCULAR LENS IMPLANT Left 02/24/2022    Dr. Hong    COLONOSCOPY N/A 2/21/2017    Procedure: COLONOSCOPY;  Surgeon: Robb Rosado MD;  Location: [facility] ENDO;  Service: Endoscopy;  Laterality: N/A;    COLONOSCOPY N/A 7/5/2023    Procedure: COLONOSCOPY;  Surgeon: Aston Varela MD;  Location: [facility] ENDO;  Service: Endoscopy;  Laterality: N/A;  Referral: JOVANNI SCANLON  ok to hold Plavix 5 days and Eliquis 2 days per Dr Ford  [facility]  Suprep   Inst portal   LW    CORONARY ANGIOGRAPHY INCLUDING BYPASS GRAFTS WITH CATHETERIZATION OF LEFT HEART N/A 11/11/2020    Procedure: ANGIOGRAM, CORONARY, INCLUDING BYPASS GRAFT, WITH LEFT HEART CATHETERIZATION;  Surgeon: Lane Cuellar MD;  Location: [facility] CATH LAB;  Service: Cardiology;  Laterality: N/A;  RN PRE OP 11-5-2020. --COVID NEGATIVE ON  11-. C A    CORONARY ARTERY BYPASS GRAFT      March 2016    EPIDURAL STEROID INJECTION Bilateral 11/14/2018    Procedure: Lumbar Medial Branch Blocks;  Surgeon: Eze Byrd Jr., MD;  Location: [facility] ENDO;  Service: Pain Management;  Laterality: Bilateral;  Bilateral Lumbar Medial Branch Blocks L2, L3, L4, L5    [id number]  [id number]    Arrive @ 1150; NO Sedation    EPIDURAL STEROID INJECTION Bilateral 11/28/2018    Procedure: Injection, Steroid, Epidural;  Surgeon: Eze Byrd Jr., MD;  Location:  [facility] ENDO;  Service: Pain Management;  Laterality: Bilateral;  Bilateral Sacroiliac Joint Steroid Injections    [id number]    Arrive @ 0910    EPIDURAL STEROID INJECTION Bilateral 3/20/2019    Procedure: Injection, Steroid, Sacroiliiac Joint;  Surgeon: Eze Byrd Jr., MD;  Location: [facility] ENDO;  Service: Pain Management;  Laterality: Bilateral;  Bilateral Sacroiliac Joint Steroid Injections    [id number]    Arrive @ 1145; Trigger point injections also?    EPIDURAL STEROID INJECTION Right 8/2/2023    Procedure: Right L5 Transforaminal Epidural Steroid Injection;  Surgeon: Eze Byrd Jr., MD;  Location: [facility] ENDO;  Service: Pain Management;  Laterality: Right;  @0830 (given)  Eliquis last 7/29  Plavix last 7/27  Check BG    EPIDURAL STEROID INJECTION Right 10/11/2023    Procedure: Right L3 (infraneural) + S1 Transforaminal Epidural Steroid Injections;  Surgeon: Eze Byrd Jr., MD;  Location: [facility] ENDO;  Service: Pain Management;  Laterality: Right;  @0745 (requests morning)  Eliquis 10/7 & Plavix 10/5  Check BG    INTRAOCULAR PROSTHESES INSERTION Left 2/24/2022    Procedure: INSERTION, IOL PROSTHESIS;  Surgeon: Nickolas Hong MD;  Location: [facility] OR;  Service: Ophthalmology;  Laterality: Left;    PHACOEMULSIFICATION OF CATARACT Left 2/24/2022    Procedure: PHACOEMULSIFICATION, CATARACT;  Surgeon: Nickolas Hong MD;  Location: [facility] OR;  Service: Ophthalmology;  Laterality: Left;  RN Phone Pre Op 2-16-22.  Covid NEGATIVE  2-23-22.  Arrival 08:00 am.    TONSILLECTOMY         Family History   Problem Relation Age of Onset    Depression Mother     Heart disease Mother     Stroke Father     No Known Problems Sister     Diabetes Sister     No Known Problems Sister     Blindness Brother     No Known Problems Maternal Aunt     No Known Problems Maternal Uncle     No Known Problems Paternal Aunt     No Known Problems Paternal Uncle     No Known Problems Maternal Grandmother     No Known Problems  Maternal Grandfather     No Known Problems Paternal Grandmother     No Known Problems Paternal Grandfather     Amblyopia Neg Hx     Cancer Neg Hx     Cataracts Neg Hx     Glaucoma Neg Hx     Hypertension Neg Hx     Macular degeneration Neg Hx     Retinal detachment Neg Hx     Strabismus Neg Hx     Thyroid disease Neg Hx        Social History     Socioeconomic History    Marital status:    Tobacco Use    Smoking status: Never     Passive exposure: Never    Smokeless tobacco: Never   Substance and Sexual Activity    Alcohol use: Yes     Comment: rare occassion    Drug use: No    Sexual activity: Yes     Partners: Female     Social Determinants of Health     Financial Resource Strain: Low Risk  (10/29/2023)    Overall Financial Resource Strain (CARDIA)     Difficulty of Paying Living Expenses: Not hard at all   Food Insecurity: No Food Insecurity (10/29/2023)    Hunger Vital Sign     Worried About Running Out of Food in the Last Year: Never true     Ran Out of Food in the Last Year: Never true   Transportation Needs: No Transportation Needs (10/29/2023)    PRAPARE - Transportation     Lack of Transportation (Medical): No     Lack of Transportation (Non-Medical): No   Physical Activity: Insufficiently Active (10/29/2023)    Exercise Vital Sign     Days of Exercise per Week: 3 days     Minutes of Exercise per Session: 40 min   Stress: No Stress Concern Present (10/29/2023)    [employer] - Occupational Stress Questionnaire     Feeling of Stress : Only a little   Recent Concern: Stress - Stress Concern Present (9/20/2023)    [employer] - Occupational Stress Questionnaire     Feeling of Stress : To some extent   Social Connections: Moderately Integrated (10/29/2023)    Social Connection and Isolation Panel [NHANES]     Frequency of Communication with Friends and Family: More than three times a week     Frequency of Social Gatherings with Friends and Family:  Three times a week     Attends [employer] Services: Never     Active Member of Clubs or Organizations: Yes     Attends Club or Organization Meetings: More than 4 times per year     Marital Status:    Housing Stability: Low Risk  (10/29/2023)    Housing Stability Vital Sign     Unable to Pay for Housing in the Last Year: No     Number of Places Lived in the Last Year: 1     Unstable Housing in the Last Year: No       Current Facility-Administered Medications   Medication Dose Route Frequency Provider Last Rate Last Admin    0.9%  NaCl infusion   Intravenous Continuous Steve Nelson MD 10 mL/hr at 12/06/23 1033 New Bag at 12/06/23 1033    LIDOcaine (PF) 10 mg/ml (1%) injection 10 mg  1 mL Intradermal Once Steve Nelson MD         Facility-Administered Medications Ordered in Other Encounters   Medication Dose Route Frequency Provider Last Rate Last Admin    cyclopentolate 1% ophthalmic solution 1 drop  1 drop Left Eye On Call Procedure Nickolas Hong MD   1 drop at 02/24/22 0901    ofloxacin 0.3 % ophthalmic solution 1 drop  1 drop Left Eye On Call Procedure Nickolas Hong MD   2 drop at 02/24/22 1017    sodium chloride 0.9% flush 10 mL  10 mL Intravenous PRN Nickolas Hong MD        triamcinolone acetonide injection 40 mg  40 mg Intra-articular  Valerie Olivera MD   40 mg at 02/09/23 1030       Review of patient's allergies indicates:   Allergen Reactions    Penicillins Other (See Comments)     Unknown reaction, Had a reaction as a child    Shrimp Itching     Hand itching     Objective:      Vitals:    12/06/23 1026   BP: (!) 140/65   Pulse: (!) 47   Resp: 17   Temp: 98.1 °F (36.7 °C)     Physical Exam   Constitutional: Patient is oriented to person, place, and time. Appears well-nourished.   HENT:   Mouth/Throat: Oropharynx is clear and moist.   Eyes: Pupils are equal, round, and reactive to light.   Neck: Neck supple.   Cardiovascular: Normal heart sounds.   Pulmonary/Chest:  Effort normal and breath sounds normal.   Abdominal: Soft. Exhibits no mass. There is no tenderness. There is no guarding.   Musculoskeletal: Normal range of motion.   Lymphadenopathy: Has no cervical adenopathy.   Neurological:Alert and oriented to person, place, and time.   Skin: Skin is warm. No rash noted.   Psychiatric: Has a normal mood and affect.     Assessment:  Iron Def Anemia    Plan:  EGD       I have reviewed the patient's medical history in detail and updated the computerized patient record

## 2023-12-06 NOTE — PLAN OF CARE
Procedure and recovery complete. Awake and alert. No c/o pain or discomfort. Resp. Even and unlabored. Wife at bedside. 240 cc orange juice and 2 packs of sung crackers Po. Tolerated well. Discharge instructions given. Verbalized understanding. No acute distress noted.

## 2023-12-06 NOTE — PROVATION PATIENT INSTRUCTIONS
Discharge Summary/Instructions after an Endoscopic Procedure  Patient Name: Driss Hernandez  Patient MRN: [id number]  Patient YOB: 1950  Wednesday, December 6, 2023  Anita Oswald MD  Dear patient,  As a result of recent federal legislation (The Federal Cures Act), you may   receive lab or pathology results from your procedure in your [facility]   account before your physician is able to contact you. Your physician or   their representative will relay the results to you with their   recommendations at their soonest availability.  Thank you,  RESTRICTIONS:  During your procedure today, you received medications for sedation.  These   medications may affect your judgment, balance and coordination.  Therefore,   for 24 hours, you have the following restrictions:   - DO NOT drive a car, operate machinery, make legal/financial decisions,   sign important papers or drink alcohol.    ACTIVITY:  Today: no heavy lifting, straining or running due to procedural   sedation/anesthesia.  The following day: return to full activity including work.  DIET:  Eat and drink normally unless instructed otherwise.     TREATMENT FOR COMMON SIDE EFFECTS:  - Mild abdominal pain, nausea, belching, bloating or excessive gas:  rest,   eat lightly and use a heating pad.  - Sore Throat: treat with throat lozenges and/or gargle with warm salt   water.  - Because air was used during the procedure, expelling large amounts of air   from your rectum or belching is normal.  - If a bowel prep was taken, you may not have a bowel movement for 1-3 days.    This is normal.  SYMPTOMS TO WATCH FOR AND REPORT TO YOUR PHYSICIAN:  1. Abdominal pain or bloating, other than gas cramps.  2. Chest pain.  3. Back pain.  4. Signs of infection such as: chills or fever occurring within 24 hours   after the procedure.  5. Rectal bleeding, which would show as bright red, maroon, or black stools.   (A tablespoon of blood from the rectum is not serious,  especially if   hemorrhoids are present.)  6. Vomiting.  7. Weakness or dizziness.  GO DIRECTLY TO THE NEAREST EMERGENCY ROOM IF YOU HAVE ANY OF THE FOLLOWING:      Difficulty breathing              Chills and/or fever over 101 F   Persistent vomiting and/or vomiting blood   Severe abdominal pain   Severe chest pain   Black, tarry stools   Bleeding- more than one tablespoon   Any other symptom or condition that you feel may need urgent attention  Your doctor recommends these additional instructions:  If any biopsies were taken, your doctors clinic will contact you in 1 to 2   weeks with any results.  - Await pathology results.   - Discharge patient to home.  For questions, problems or results please call your physician - Anita Oswald MD at Work:  [phone].  [facility] can be reached at [phone]     IF A COMPLICATION OR EMERGENCY SITUATION ARISES AND YOU ARE UNABLE TO REACH   YOUR PHYSICIAN - GO DIRECTLY TO THE EMERGENCY ROOM.  Anita Oswald MD  12/6/2023 11:33:05 AM  This report has been verified and signed electronically.  Dear patient,  As a result of recent federal legislation (The Federal Cures Act), you may   receive lab or pathology results from your procedure in your [facility]   account before your physician is able to contact you. Your physician or   their representative will relay the results to you with their   recommendations at their soonest availability.  Thank you,  PROVATION

## 2023-12-06 NOTE — ANESTHESIA POSTPROCEDURE EVALUATION
Anesthesia Post Evaluation    Patient: Driss Hrenandez     Procedure(s) Performed: Procedure(s) (LRB):  EGD (ESOPHAGOGASTRODUODENOSCOPY) (N/A)    Final Anesthesia Type: general      Patient location during evaluation: GI PACU  Patient participation: Yes- Able to Participate  Level of consciousness: awake and alert, oriented and awake  Post-procedure vital signs: reviewed and stable  Airway patency: patent    PONV status at discharge: No PONV  Anesthetic complications: no      Cardiovascular status: blood pressure returned to baseline  Respiratory status: unassisted, spontaneous ventilation and room air  Hydration status: euvolemic  Follow-up not needed.              Vitals Value Taken Time   /70 12/06/23 1142   Temp 36.4 °C (97.5 °F) 12/06/23 1112   Pulse 48 12/06/23 1142   Resp 20 12/06/23 1142   SpO2 100 % 12/06/23 1142         Event Time   Out of Recovery 12:14:40         Pain/Jose Maria Score: Jose Maria Score: 10 (12/6/2023 11:42 AM)

## 2023-12-06 NOTE — ANESTHESIA PREPROCEDURE EVALUATION
07/05/2023  Driss Hernandez Jr. is a 72 y.o., male.  To undergo Procedure(s) (LRB):  COLONOSCOPY (N/A)     Denies CP/SOB/GERD/MI/CVA/URI symptoms.  METS > 4  NPO > 8    Past Medical History:  Past Medical History:   Diagnosis Date    Chronic heart failure with preserved ejection fraction 2/9/2023    Chronic midline low back pain without sciatica 10/2/2017    Colon polyps     Coronary artery disease involving native coronary artery of native heart 3/5/2020    Coronary artery disease involving native coronary artery of native heart with angina pectoris 12/10/2020    Diabetes mellitus with neurological manifestations, uncontrolled 1/24/2017    Diabetic polyneuropathy associated with type 2 diabetes mellitus 1/24/2017    Diabetic polyneuropathy associated with type 2 diabetes mellitus     Essential hypertension 1/24/2017    Gastroesophageal reflux disease 1/24/2017    Hyperlipidemia 1/24/2017    Insomnia 1/24/2017    Long-term insulin use 1/24/2017    Longstanding persistent atrial fibrillation 12/30/2020    Lumbar spondylosis 11/13/2017    Nuclear sclerosis of both eyes 8/24/2017    Obesity     PAD (peripheral artery disease) 3/23/2022    Stage 3 chronic kidney disease 2/13/2019    Uncontrolled type 2 diabetes mellitus without complication, with long-term current use of insulin 1/24/2017       Past Surgical History:  Past Surgical History:   Procedure Laterality Date    ARTHROSCOPIC REPAIR OF ROTATOR CUFF OF SHOULDER Right 7/5/2022    Procedure: REPAIR, ROTATOR CUFF, ARTHROSCOPIC;  Surgeon: Valerie Olivera MD;  Location: [facility];  Service: Orthopedics;  Laterality: Right;  GUADALUPE AND NEPHJENNIFER LEMUS [phone]/ TEXTED ALLAN ON 6/23/2022 @ 9:47AM. ALLAN RESPONDED ON 6/23/2022 @ 10:33AM-BREANNA BABIN [phone] MY BE HERE FOR CASE SPOKE TO HIM @ 9:59AM ON 7-1-2022  RN PREOP 6/28/2022    BACK SURGERY       2002    CATARACT EXTRACTION W/  INTRAOCULAR LENS IMPLANT Right 08/29/2017    Dr. Hong    CATARACT EXTRACTION W/  INTRAOCULAR LENS IMPLANT Left 02/24/2022    Dr. Hong    COLONOSCOPY N/A 2/21/2017    Procedure: COLONOSCOPY;  Surgeon: Robb Rosado MD;  Location: [facility] ENDO;  Service: Endoscopy;  Laterality: N/A;    CORONARY ANGIOGRAPHY INCLUDING BYPASS GRAFTS WITH CATHETERIZATION OF LEFT HEART N/A 11/11/2020    Procedure: ANGIOGRAM, CORONARY, INCLUDING BYPASS GRAFT, WITH LEFT HEART CATHETERIZATION;  Surgeon: Lane Cuellar MD;  Location: [facility] CATH LAB;  Service: Cardiology;  Laterality: N/A;  RN PRE OP 11-5-2020. --COVID NEGATIVE ON  11-. C A    CORONARY ARTERY BYPASS GRAFT      March 2016    EPIDURAL STEROID INJECTION Bilateral 11/14/2018    Procedure: Lumbar Medial Branch Blocks;  Surgeon: Eze Byrd Jr., MD;  Location: [facility] ENDO;  Service: Pain Management;  Laterality: Bilateral;  Bilateral Lumbar Medial Branch Blocks L2, L3, L4, L5    [id number]  [id number]    Arrive @ 1150; NO Sedation    EPIDURAL STEROID INJECTION Bilateral 11/28/2018    Procedure: Injection, Steroid, Epidural;  Surgeon: Eze Byrd Jr., MD;  Location: [facility] ENDO;  Service: Pain Management;  Laterality: Bilateral;  Bilateral Sacroiliac Joint Steroid Injections    30711    Arrive @ 0910    EPIDURAL STEROID INJECTION Bilateral 3/20/2019    Procedure: Injection, Steroid, Sacroiliiac Joint;  Surgeon: Eze Byrd Jr., MD;  Location: [facility] ENDO;  Service: Pain Management;  Laterality: Bilateral;  Bilateral Sacroiliac Joint Steroid Injections    53532    Arrive @ 1145; Trigger point injections also?    INTRAOCULAR PROSTHESES INSERTION Left 2/24/2022    Procedure: INSERTION, IOL PROSTHESIS;  Surgeon: Nickolas Hong MD;  Location: [facility] OR;  Service: Ophthalmology;  Laterality: Left;    PHACOEMULSIFICATION OF CATARACT Left 2/24/2022    Procedure: PHACOEMULSIFICATION, CATARACT;  Surgeon: Nickolas MARC  MD Gely;  Location: [facility];  Service: Ophthalmology;  Laterality: Left;  RN Phone Pre Op 2-16-22.  Covid NEGATIVE  2-23-22.  Arrival 08:00 am.    TONSILLECTOMY         Social History:  Social History     Socioeconomic History    Marital status:    Tobacco Use    Smoking status: Never     Passive exposure: Never    Smokeless tobacco: Never   Substance and Sexual Activity    Alcohol use: Yes     Comment: rare occassion    Drug use: No    Sexual activity: Yes     Partners: Female     Social Determinants of Health     Financial Resource Strain: Low Risk     Difficulty of Paying Living Expenses: Not hard at all   Food Insecurity: No Food Insecurity    Worried About Running Out of Food in the Last Year: Never true    Ran Out of Food in the Last Year: Never true   Transportation Needs: No Transportation Needs    Lack of Transportation (Medical): No    Lack of Transportation (Non-Medical): No   Physical Activity: Inactive    Days of Exercise per Week: 0 days    Minutes of Exercise per Session: 0 min   Stress: Stress Concern Present    Feeling of Stress : Rather much   Social Connections: Moderately Integrated    Frequency of Communication with Friends and Family: More than three times a week    Frequency of Social Gatherings with Friends and Family: Three times a week    Attends [employer] Services: Never    Active Member of Clubs or Organizations: Yes    Attends Club or Organization Meetings: More than 4 times per year    Marital Status:    Housing Stability: Low Risk     Unable to Pay for Housing in the Last Year: No    Number of Places Lived in the Last Year: 1    Unstable Housing in the Last Year: No       Medications:  Current Facility-Administered Medications on File Prior to Encounter   Medication Dose Route Frequency Provider Last Rate Last Admin    cyclopentolate 1% ophthalmic solution 1 drop  1 drop Left Eye On Call Procedure Nickolas Hong MD   1 drop at 02/24/22 0901    ofloxacin 0.3  "% ophthalmic solution 1 drop  1 drop Left Eye On Call Procedure Nickolas Hong MD   2 drop at 02/24/22 1017    sodium chloride 0.9% flush 10 mL  10 mL Intravenous PRN Nickolas Hong MD        triamcinolone acetonide injection 40 mg  40 mg Intra-articular  Valerie Olivera MD   40 mg at 02/09/23 1030     Current Outpatient Medications on File Prior to Encounter   Medication Sig Dispense Refill    albuterol (PROVENTIL/VENTOLIN HFA) 90 mcg/actuation inhaler Inhale 2 puffs into the lungs every 4 (four) hours as needed for Shortness of Breath. Rescue 17 g 3    ascorbic acid, vitamin C, (VITAMIN C) 100 MG tablet Take 100 mg by mouth daily as needed.      atorvastatin (LIPITOR) 80 MG tablet Take 1 tablet (80 mg total) by mouth every evening. 90 tablet 3    b complex vitamins tablet Take 1 tablet by mouth once daily.      BD ALCOHOL SWABS PadM       BD ULTRA-FINE ROLAND PEN NEEDLES 32 gauge x 5/32" Ndle       blood sugar diagnostic Strp Check blood glucose 2 times a day. True metrix. E11.65 200 strip 3    blood-glucose meter kit Test glucose 2-3x/day. True metrix 1 each 0    celecoxib (CELEBREX) 100 MG capsule TAKE 1 CAPSULE(100 MG) BY MOUTH EVERY DAY 30 capsule 6    clopidogreL (PLAVIX) 75 mg tablet TAKE 1 TABLET(75 MG) BY MOUTH EVERY DAY 90 tablet 3    coenzyme Q10 100 mg capsule Take 100 mg by mouth once daily.      dapagliflozin (FARXIGA) 5 mg Tab tablet Take 1 tablet (5 mg total) by mouth once daily. 30 tablet 3    ELIQUIS 5 mg Tab TAKE 1 TABLET TWICE DAILY 180 tablet 3    ergocalciferol (VITAMIN D2) 50,000 unit Cap TAKE ONE CAPSULE BY MOUTH WEEKLY 12 capsule 2    fenofibrate 160 MG Tab TAKE 1 TABLET EVERY MORNING 90 tablet 1    furosemide (LASIX) 20 MG tablet TAKE 1 TABLET TWICE DAILY 180 tablet 3    gabapentin (NEURONTIN) 100 MG capsule TAKE 2 CAPSULES EVERY MORNING AND TAKE 3 CAPSULES EVERY DAY WITH DINNER 450 capsule 12    glimepiride (AMARYL) 2 MG tablet TAKE 1 TABLET EVERY DAY BEFORE BREAKFAST 90 " tablet 2    hydrALAZINE (APRESOLINE) 50 MG tablet Take 1 tablet (50 mg total) by mouth 2 (two) times a day. 180 tablet 3    HYDROcodone-acetaminophen (NORCO) 5-325 mg per tablet Take 1 tablet by mouth every 8 (eight) hours as needed for Pain. 15 tablet 0    insulin degludec (TRESIBA FLEXTOUCH U-100) 100 unit/mL (3 mL) insulin pen Inject 20 Units into the skin once daily. 10 pen 4    isosorbide mononitrate (IMDUR) 60 MG 24 hr tablet Take 1 tablet (60 mg total) by mouth once daily. 90 tablet 11    ketoconazole (NIZORAL) 2 % cream Apply topically once daily. 60 g 6    lancets Misc Check blood glucose 2x/day. True metrix. E11.65 200 each 3    magnesium 250 mg Tab Take 1 tablet by mouth once daily.       metFORMIN (GLUCOPHAGE-XR) 500 MG ER 24hr tablet Take 1 tablet with breakfast and 2 tablets with supper. 270 tablet 2    nitroGLYCERIN (NITROSTAT) 0.4 MG SL tablet Place 1 tablet (0.4 mg total) under the tongue every 5 (five) minutes as needed for Chest pain. 25 tablet 11    omega-3 acid ethyl esters (LOVAZA) 1 gram capsule Take 2 capsules (2 g total) by mouth 2 (two) times daily. 360 capsule 3    ondansetron (ZOFRAN-ODT) 4 MG TbDL Dissolve 1 tablet (4 mg total) by mouth every 6 (six) hours as needed (nausea). 10 tablet 0    pantoprazole (PROTONIX) 40 MG tablet TAKE 1 TABLET EVERY DAY 90 tablet 3    semaglutide (OZEMPIC) 2 mg/dose (8 mg/3 mL) PnIj Inject 2 mg into the skin every 7 days. 4 each 3    semaglutide (OZEMPIC) 2 mg/dose (8 mg/3 mL) PnIj Inject 2 mg into the skin every 7 days. 1 each 0    tiZANidine (ZANAFLEX) 4 MG tablet Take 4 mg by mouth every 6 (six) hours as needed.      triazolam (HALCION) 0.25 MG Tab TAKE 1 TABLET BY MOUTH EVERY NIGHT AT BEDTIME AS NEEDED 30 tablet 5    TRUEPLUS LANCETS 33 gauge Misc          Allergies:  Review of patient's allergies indicates:   Allergen Reactions    Penicillins Other (See Comments)     Unknown reaction, Had a reaction as a child    Shrimp Itching     Hand itching        Active Problems:  Patient Active Problem List   Diagnosis    DM type 2 without retinopathy    Diabetes mellitus with neurological manifestations, uncontrolled    Insomnia    Gastroesophageal reflux disease    Long-term insulin use    Mixed hyperlipidemia    Essential hypertension    Diabetic polyneuropathy associated with type 2 diabetes mellitus    Chronic right shoulder pain    Decreased strength of upper extremity    Decreased range of motion (ROM) of shoulder    Posture imbalance    Nuclear sclerosis, left    Posterior subcapsular age-related cataract of both eyes    Refractive error    Senile nuclear sclerosis    Chronic midline low back pain without sciatica    Lumbar spondylosis    DDD (degenerative disc disease), lumbar    Postlaminectomy syndrome of lumbar region    Lumbar radiculopathy, right    Lumbar radiculopathy    Pseudophakia    Obesity (BMI 30.0-34.9)    Lumbosacral spondylosis    Bilateral sacroiliitis    Stage 3a chronic kidney disease    Diabetes mellitus due to underlying condition with stage 3 chronic kidney disease, with long-term current use of insulin    Chest pain, atypical    Hx of CABG    Coronary artery disease involving native coronary artery of native heart    Aortic atherosclerosis    Sedative dependence    Pre-op testing    Coronary artery disease involving coronary bypass graft of native heart with unstable angina pectoris    Longstanding persistent atrial fibrillation    Long term (current) use of anticoagulants    Cervical radiculopathy    Cervical spondylosis    DDD (degenerative disc disease), cervical    Weakness of shoulder    Winging of scapula    Impaired mobility and ADLs    Nuclear sclerotic cataract of left eye    Atherosclerosis of both carotid arteries    PAD (peripheral artery disease)    History of percutaneous coronary intervention    Type 2 diabetes mellitus with hyperglycemia, with long-term current use of insulin    Nontraumatic incomplete tear of left  rotator cuff    Decreased range of motion of right shoulder    Impaired strength of shoulder muscles    Chronic heart failure with preserved ejection fraction    Atherosclerosis of coronary artery bypass graft with angina pectoris, unspecified whether native or transplanted heart       Diagnostic Studies:   Latest Reference Range & Units 06/24/23 21:18   WBC 3.90 - 12.70 K/uL 7.09   RBC 4.60 - 6.20 M/uL 5.37   Hemoglobin 14.0 - 18.0 g/dL 10.8 (L)   Hematocrit 40.0 - 54.0 % 37.0 (L)   MCV 82 - 98 fL 69 (L)   MCH 27.0 - 31.0 pg 20.1 (L)   MCHC 32.0 - 36.0 g/dL 29.2 (L)   RDW 11.5 - 14.5 % 20.7 (H)   Platelets 150 - 450 K/uL 245   MPV 9.2 - 12.9 fL 9.1 (L)   Gran % 38.0 - 73.0 % 63.3   Lymph % 18.0 - 48.0 % 21.7   Mono % 4.0 - 15.0 % 9.3   Eosinophil % 0.0 - 8.0 % 5.1   Basophil % 0.0 - 1.9 % 0.3   Immature Granulocytes 0.0 - 0.5 % 0.3   Gran # (ANC) 1.8 - 7.7 K/uL 4.5   Lymph # 1.0 - 4.8 K/uL 1.5   Mono # 0.3 - 1.0 K/uL 0.7   Eos # 0.0 - 0.5 K/uL 0.4   Baso # 0.00 - 0.20 K/uL 0.02   Immature Grans (Abs) 0.00 - 0.04 K/uL 0.02   nRBC 0 /100 WBC 0   Differential Method  Automated      Latest Reference Range & Units 06/24/23 21:18   Sodium 136 - 145 mmol/L 140   Potassium 3.5 - 5.1 mmol/L 4.2   Chloride 95 - 110 mmol/L 107   CO2 23 - 29 mmol/L 23   Anion Gap 8 - 16 mmol/L 10   BUN 8 - 23 mg/dL 25 (H)   Creatinine 0.5 - 1.4 mg/dL 1.4   eGFR >60 mL/min/1.73 m^2 53 !     EKG (6/24/23):  Atrial fibrillation with slow ventricular response with premature   ventricular or aberrantly conducted complexes   Rightward axis   ST and T wave abnormality, consider inferior ischemia     Nuclear Stress (3/13/23):    The patient exercised for 8 minutes 16 seconds on a Marcial protocol, corresponding to a functional capacity of 9 METS, achieving a peak heart rate of 127 bpm, which is 86 % of the age predicted maximum heart rate. Their exercise capacity was above average.    Normal myocardial perfusion scan. There is no evidence of myocardial  ischemia or infarction.    There is a mild to moderate intensity perfusion abnormality in the anteroseptal wall of the left ventricle consistent with the RV insertion site.    The gated perfusion images showed an ejection fraction of 66% post stress.    There is normal wall motion post stress.    The ECG portion of the study is negative for ischemia.    The patient reported no chest pain during the stress test.    During stress, rare PVCs are noted.    The exercise capacity was above average.    TTE (3/13/23):  The left ventricle is normal in size with concentric hypertrophy and normal systolic function.  The estimated ejection fraction is 55%.  Normal left ventricular diastolic function.  Normal right ventricular size with normal right ventricular systolic function.  Moderate left atrial enlargement.  Mild right atrial enlargement.  Normal central venous pressure (3 mmHg).  The estimated PA systolic pressure is 11 mmHg.  There is no pulmonary hypertension.    Cath (12/10/20):  1.  90% mid OM2 stenosis.  2.  Successful PCI with placement of a 2.25 x 26 mm drug-eluting stent reducing the 90% stenosis to 0%.  INGRIS 3 flow.  No dissection.  Good angiographic results.    24 Hour Vitals:      See Nursing Charting For Additional Vitals      Pre-op Assessment    I have reviewed the Patient Summary Reports.     I have reviewed the Nursing Notes.       Review of Systems  Anesthesia Hx:  No problems with previous Anesthesia   Denies Personal Hx of Anesthesia complications.   Social:  Non-Smoker, Social Alcohol Use    Cardiovascular:   Exercise tolerance: good Hypertension CAD  CABG/stent Dysrhythmias atrial fibrillation CHF PVD hyperlipidemia ECG has been reviewed. Plavix/Eliquis   Pulmonary:  Pulmonary Normal    Renal/:   Chronic Renal Disease, CKD    Hepatic/GI:  Hepatic/GI Normal    Musculoskeletal:  Spine Disorders: lumbar Degenerative disease and Disc disease    Neurological:  Neurology Normal    Endocrine:   Diabetes  ozempic q 7 days       Physical Exam  General: Well nourished and Cooperative    Airway:  Mallampati: III   Mouth Opening: Normal  TM Distance: Normal      Dental:  Intact    Chest/Lungs:  Clear to auscultation, Normal Respiratory Rate    Heart:  Rate: Normal  Rhythm: Regular Rhythm        Anesthesia Plan  Type of Anesthesia, risks & benefits discussed:    Anesthesia Type: Gen Natural Airway, MAC, Gen ETT  Intra-op Monitoring Plan: Standard ASA Monitors  Post Op Pain Control Plan: multimodal analgesia  Induction:  IV  Informed Consent: Informed consent signed with the Patient and all parties understand the risks and agree with anesthesia plan.  All questions answered.   ASA Score: 3    Ready For Surgery From Anesthesia Perspective.     .                                                                                                               12/06/2023  Driss Hernandez Jr. is a 73 y.o., male.      Pre-op Assessment          Review of Systems  Cardiovascular:     Hypertension   CAD      Angina                                  Hepatic/GI:     GERD             Musculoskeletal:  Arthritis               Neurological:    Neuromuscular Disease,                                   Endocrine:  Diabetes, type 2                  Anesthesia Plan  Type of Anesthesia, risks & benefits discussed:    Anesthesia Type: Gen Natural Airway  Intra-op Monitoring Plan: Standard ASA Monitors  Induction:  IV  Informed Consent: Informed consent signed with the Patient and all parties understand the risks and agree with anesthesia plan.  All questions answered.   ASA Score: 3    Ready For Surgery From Anesthesia Perspective.     .

## 2023-12-13 LAB
FINAL PATHOLOGIC DIAGNOSIS: NORMAL
Lab: NORMAL

## 2023-12-17 DIAGNOSIS — E78.49 OTHER HYPERLIPIDEMIA: ICD-10-CM

## 2023-12-18 ENCOUNTER — PATIENT MESSAGE (OUTPATIENT)
Dept: GASTROENTEROLOGY | Facility: CLINIC | Age: 73
End: 2023-12-18
Payer: MEDICARE

## 2023-12-19 RX ORDER — FENOFIBRATE 160 MG/1
TABLET ORAL
Qty: 90 TABLET | Refills: 12 | Status: SHIPPED | OUTPATIENT
Start: 2023-12-19

## 2024-01-02 NOTE — TELEPHONE ENCOUNTER
No care due was identified.  [facility] Embedded Care Due Messages. Reference number: [id number].   1/01/2024 9:20:50 PM CST

## 2024-01-04 RX ORDER — GABAPENTIN 100 MG/1
CAPSULE ORAL
Qty: 450 CAPSULE | Refills: 12 | Status: SHIPPED | OUTPATIENT
Start: 2024-01-04

## 2024-01-05 ENCOUNTER — PATIENT MESSAGE (OUTPATIENT)
Dept: PHARMACY | Facility: CLINIC | Age: 74
End: 2024-01-05
Payer: MEDICARE

## 2024-01-05 ENCOUNTER — TELEPHONE (OUTPATIENT)
Dept: PHARMACY | Facility: CLINIC | Age: 74
End: 2024-01-05
Payer: MEDICARE

## 2024-01-05 NOTE — TELEPHONE ENCOUNTER
Hello,       A 2024 [employer](Tresiba, Ozempic, [place]) re-enrollment Patient Assistance Application for Driss Hernandez JrCherise - MRN  [id number] was faxed to your office @[phone]. Please have Anastacio Guajardo  review the application to ensure the prescription is correct. If correct, sign and fax the application back to the Pharmacy Patient Assistance Team @[phone].      If changes need to be made to this application, please let me know so corrections can be made, and the application will be faxed back to you for approval and signature.

## 2024-01-05 NOTE — TELEPHONE ENCOUNTER
We are pleased to inform you Driss Christiansonshantal Akbar has been approved in the [employer] Patient Assistance Program.  [employer] has shared this information with your patient.   Approval Details  Eligibility start Date: 01/01/2024  Eligibility end date:12/31/2024 (Updated proof of eligibility required to re-enroll for 2025 calendar year).  Approved Medication: Farxiga  Day supply: 90 day supply   Allotted Refills: 3 refills) (Please be advised Program allows only 3 refills per eligibility period regardless of changes in strength).   Estimated Shipping: 10-14 business days.  Shipping Location: Patient's Home (You and your patient will receive further communication from an [facility] once Medication is received)                                            Important Note  It is imperative that any changes to strength or discontinuation of this medication be referred to the Pharmacy Patient Assistance Team Pool Via [employer] to prevent Gaps in therapy.

## 2024-01-08 ENCOUNTER — TELEPHONE (OUTPATIENT)
Dept: ENDOCRINOLOGY | Facility: CLINIC | Age: 74
End: 2024-01-08
Payer: MEDICARE

## 2024-01-08 NOTE — TELEPHONE ENCOUNTER
Spoke to patient states that he finally spoke to someone in pharmacy assistance and they began the renewal process for his ozempic

## 2024-01-08 NOTE — TELEPHONE ENCOUNTER
----- Message from Roshan Jacobo sent at 1/8/2024  9:42 AM CST -----  Type: Patient Call Back    Who called:Self    What is the request in detail:In regards to medication refill, pt needs paper work filled out    Can the clinic reply by [facility]?No    Would the patient rather a call back or a response via [facility]? Call    Best call back number:.[phone] (home)       Additional Information:

## 2024-01-10 ENCOUNTER — PATIENT MESSAGE (OUTPATIENT)
Dept: ADMINISTRATIVE | Facility: HOSPITAL | Age: 74
End: 2024-01-10
Payer: MEDICARE

## 2024-01-10 ENCOUNTER — PATIENT OUTREACH (OUTPATIENT)
Dept: ADMINISTRATIVE | Facility: HOSPITAL | Age: 74
End: 2024-01-10
Payer: MEDICARE

## 2024-01-18 ENCOUNTER — TELEPHONE (OUTPATIENT)
Dept: ENDOCRINOLOGY | Facility: CLINIC | Age: 74
End: 2024-01-18
Payer: MEDICARE

## 2024-01-18 RX ORDER — BLOOD SUGAR DIAGNOSTIC
STRIP MISCELLANEOUS
Qty: 200 EACH | Refills: 3 | Status: SHIPPED | OUTPATIENT
Start: 2024-01-18

## 2024-01-18 NOTE — TELEPHONE ENCOUNTER
----- Message from Shashank Morales sent at 1/18/2024 12:11 PM CST -----  Regarding: NovoFine 32G Pen [place] Patient Assistance Program  [facility] has received medication from [facility] patient assistance program for your patient Driss Jason Mary Akbar ([id number]).  At your earliest convenience please send to [facility] escript for.   NovoFine 32G Pen Needles (qty 200) Thanks

## 2024-01-22 ENCOUNTER — TELEPHONE (OUTPATIENT)
Dept: OTOLARYNGOLOGY | Facility: CLINIC | Age: 74
End: 2024-01-22
Payer: MEDICARE

## 2024-01-22 NOTE — TELEPHONE ENCOUNTER
Patient states he now has a humming in his ears and feel like hearing is getting worse. Schedule for Thursday with repeat audio

## 2024-01-22 NOTE — TELEPHONE ENCOUNTER
----- Message from Slick Mello sent at 1/22/2024  9:59 AM CST -----  Regarding: pt called  Type: Patient Call Back         Who called: FREDY ROLLE JR. [[id number]]         What is the request in detail:  Pt states he constantly have humming in his ear and it is bothering him.  Pt states he also feels like he is losing his hearing in the same ear.          Can the clinic reply by [facility]? No         Would the patient rather a call back or a response via [facility]? Call back         Best call back number:  Telephone Information:  Mobile          [phone]             Additional Information:         Thank you.

## 2024-01-23 ENCOUNTER — LAB VISIT (OUTPATIENT)
Dept: LAB | Facility: HOSPITAL | Age: 74
End: 2024-01-23
Payer: MEDICARE

## 2024-01-23 DIAGNOSIS — E11.8 DIABETES MELLITUS TYPE 2 WITH COMPLICATIONS: ICD-10-CM

## 2024-01-23 LAB
ESTIMATED AVG GLUCOSE: 131 MG/DL (ref 68–131)
HBA1C MFR BLD: 6.2 % (ref 4–5.6)

## 2024-01-23 PROCEDURE — 36415 COLL VENOUS BLD VENIPUNCTURE: CPT | Mod: PO | Performed by: NURSE PRACTITIONER

## 2024-01-23 PROCEDURE — 83036 HEMOGLOBIN GLYCOSYLATED A1C: CPT | Performed by: NURSE PRACTITIONER

## 2024-01-25 ENCOUNTER — CLINICAL SUPPORT (OUTPATIENT)
Dept: AUDIOLOGY | Facility: CLINIC | Age: 74
End: 2024-01-25
Payer: MEDICARE

## 2024-01-25 ENCOUNTER — OFFICE VISIT (OUTPATIENT)
Dept: OTOLARYNGOLOGY | Facility: CLINIC | Age: 74
End: 2024-01-25
Payer: MEDICARE

## 2024-01-25 VITALS
DIASTOLIC BLOOD PRESSURE: 76 MMHG | SYSTOLIC BLOOD PRESSURE: 144 MMHG | HEIGHT: 67 IN | BODY MASS INDEX: 27.47 KG/M2 | WEIGHT: 175 LBS

## 2024-01-25 DIAGNOSIS — H69.93 DYSFUNCTION OF BOTH EUSTACHIAN TUBES: ICD-10-CM

## 2024-01-25 DIAGNOSIS — H93.13 TINNITUS OF BOTH EARS: ICD-10-CM

## 2024-01-25 DIAGNOSIS — J34.3 HYPERTROPHY OF INFERIOR NASAL TURBINATE: ICD-10-CM

## 2024-01-25 DIAGNOSIS — H90.3 SENSORINEURAL HEARING LOSS (SNHL) OF BOTH EARS: Primary | ICD-10-CM

## 2024-01-25 DIAGNOSIS — H90.A22 SENSORINEURAL HEARING LOSS (SNHL) OF LEFT EAR WITH RESTRICTED HEARING OF RIGHT EAR: Primary | ICD-10-CM

## 2024-01-25 DIAGNOSIS — J30.2 SEASONAL ALLERGIC RHINITIS, UNSPECIFIED TRIGGER: ICD-10-CM

## 2024-01-25 PROCEDURE — 1159F MED LIST DOCD IN RCRD: CPT | Mod: CPTII,S$GLB,, | Performed by: OTOLARYNGOLOGY

## 2024-01-25 PROCEDURE — 3072F LOW RISK FOR RETINOPATHY: CPT | Mod: CPTII,S$GLB,, | Performed by: OTOLARYNGOLOGY

## 2024-01-25 PROCEDURE — 99214 OFFICE O/P EST MOD 30 MIN: CPT | Mod: S$GLB,,, | Performed by: OTOLARYNGOLOGY

## 2024-01-25 PROCEDURE — 3078F DIAST BP <80 MM HG: CPT | Mod: CPTII,S$GLB,, | Performed by: OTOLARYNGOLOGY

## 2024-01-25 PROCEDURE — 1101F PT FALLS ASSESS-DOCD LE1/YR: CPT | Mod: CPTII,S$GLB,, | Performed by: OTOLARYNGOLOGY

## 2024-01-25 PROCEDURE — 3077F SYST BP >= 140 MM HG: CPT | Mod: CPTII,S$GLB,, | Performed by: OTOLARYNGOLOGY

## 2024-01-25 PROCEDURE — 3008F BODY MASS INDEX DOCD: CPT | Mod: CPTII,S$GLB,, | Performed by: OTOLARYNGOLOGY

## 2024-01-25 PROCEDURE — 3288F FALL RISK ASSESSMENT DOCD: CPT | Mod: CPTII,S$GLB,, | Performed by: OTOLARYNGOLOGY

## 2024-01-25 PROCEDURE — 3044F HG A1C LEVEL LT 7.0%: CPT | Mod: CPTII,S$GLB,, | Performed by: OTOLARYNGOLOGY

## 2024-01-25 PROCEDURE — 1126F AMNT PAIN NOTED NONE PRSNT: CPT | Mod: CPTII,S$GLB,, | Performed by: OTOLARYNGOLOGY

## 2024-01-25 PROCEDURE — 92557 COMPREHENSIVE HEARING TEST: CPT | Mod: S$GLB,,,

## 2024-01-25 PROCEDURE — 92550 TYMPANOMETRY & REFLEX THRESH: CPT | Mod: S$GLB,,,

## 2024-01-25 NOTE — PROGRESS NOTES
OTOLARYNGOLOGY CLINIC NOTE  Date:  01/25/2024     Chief complaint:  Chief Complaint   Patient presents with    Hearing Loss     Tinnitus to R ear constant but worse at times       History of Present Illness  Driss Hernandez Jr. is a 73 y.o. male  presenting today for a followup.  Thought he was hring getting worse but has aconstant hissing in the right ear and very high pitched   First happened a couple of months ago and when it itinitally happened it was reaelly   Having harder time hearing his wife sincve that time as well. If lays on left side can't hear as well  Has been using nasal sprays had trouble with one of them but not sure which one    I last saw the patient on 8-18 - 23. Here for 6 month follow up with repeat audio. Last visti had air bone gap and tuning forks AC> BC. Lateralized to right  but no effusion or cholesteatoma  Below text is copied from  note on that date describing history of present illness at that time :  Hearing loss.  Removed wax on right side with a nano pin   Unsure if hearing has improved since wax came out      Had some congestion a few weeks ago in his nose but he thinks he may have had covid. Normally his left ear bothers him more than the right ear.   Hearing feels muffled on right   No ear pain   No otorrhea. Sometimes ears itch   Left ear typically is the one that has popping sensations    Past Medical History  Past Medical History:   Diagnosis Date    Chronic heart failure with preserved ejection fraction 2/9/2023    Chronic midline low back pain without sciatica 10/2/2017    Colon polyps     Coronary artery disease involving native coronary artery of native heart 3/5/2020    Coronary artery disease involving native coronary artery of native heart with angina pectoris 12/10/2020    Diabetes mellitus with neurological manifestations, uncontrolled 1/24/2017    Diabetic polyneuropathy associated with type 2 diabetes mellitus 1/24/2017    Diabetic polyneuropathy associated  with type 2 diabetes mellitus     Essential hypertension 1/24/2017    Gastroesophageal reflux disease 1/24/2017    Hyperlipidemia 1/24/2017    Insomnia 1/24/2017    Long-term insulin use 1/24/2017    Longstanding persistent atrial fibrillation 12/30/2020    Lumbar spondylosis 11/13/2017    Nuclear sclerosis of both eyes 8/24/2017    Obesity     PAD (peripheral artery disease) 3/23/2022    Stage 3 chronic kidney disease 2/13/2019    Uncontrolled type 2 diabetes mellitus without complication, with long-term current use of insulin 1/24/2017        Past Surgical History  Past Surgical History:   Procedure Laterality Date    ARTHROSCOPIC REPAIR OF ROTATOR CUFF OF SHOULDER Right 7/5/2022    Procedure: REPAIR, ROTATOR CUFF, ARTHROSCOPIC;  Surgeon: Valerie Olivera MD;  Location: [facility] OR;  Service: Orthopedics;  Laterality: Right;  HETAL LEMUS [phone]/ TEXTED ALLAN ON 6/23/2022 @ 9:47AM. ALLAN RESPONDED ON 6/23/2022 @ 10:33AM-  JEAN PAUL BABIN [phone] MY BE HERE FOR CASE SPOKE TO HIM @ 9:59AM ON 7-1-2022  RN PREOP 6/28/2022    BACK SURGERY      2002    CATARACT EXTRACTION W/  INTRAOCULAR LENS IMPLANT Right 08/29/2017    Dr. Hong    CATARACT EXTRACTION W/  INTRAOCULAR LENS IMPLANT Left 02/24/2022    Dr. Hong    COLONOSCOPY N/A 2/21/2017    Procedure: COLONOSCOPY;  Surgeon: Robb Rosado MD;  Location: [facility] ENDO;  Service: Endoscopy;  Laterality: N/A;    COLONOSCOPY N/A 7/5/2023    Procedure: COLONOSCOPY;  Surgeon: Aston Varela MD;  Location: [facility] ENDO;  Service: Endoscopy;  Laterality: N/A;  Referral: JOVANNI SCANLON  ok to hold Plavix 5 days and Eliquis 2 days per Dr Purdy-OFELIA   Meds  Suprep   Inst portal   LW    CORONARY ANGIOGRAPHY INCLUDING BYPASS GRAFTS WITH CATHETERIZATION OF LEFT HEART N/A 11/11/2020    Procedure: ANGIOGRAM, CORONARY, INCLUDING BYPASS GRAFT, WITH LEFT HEART CATHETERIZATION;  Surgeon: Lane Cuellar MD;  Location: [facility] CATH LAB;  Service: Cardiology;   Laterality: N/A;  RN PRE OP 2020. --COVID NEGATIVE ON  11-. C A    CORONARY ARTERY BYPASS GRAFT      2016    EPIDURAL STEROID INJECTION Bilateral 2018    Procedure: Lumbar Medial Branch Blocks;  Surgeon: Eze Byrd Jr., MD;  Location: [facility] ENDO;  Service: Pain Management;  Laterality: Bilateral;  Bilateral Lumbar Medial Branch Blocks L2, L3, L4, L5    44772  [id number]    Arrive @ 1150; NO Sedation    EPIDURAL STEROID INJECTION Bilateral 2018    Procedure: Injection, Steroid, Epidural;  Surgeon: Eze Byrd Jr., MD;  Location: [facility] ENDO;  Service: Pain Management;  Laterality: Bilateral;  Bilateral Sacroiliac Joint Steroid Injections    30167    Arrive @ 0910    EPIDURAL STEROID INJECTION Bilateral 3/20/2019    Procedure: Injection, Steroid, Sacroiliiac Joint;  Surgeon: Eze Byrd Jr., MD;  Location: [facility] ENDO;  Service: Pain Management;  Laterality: Bilateral;  Bilateral Sacroiliac Joint Steroid Injections    90505    Arrive @ 1145; Trigger point injections also?    EPIDURAL STEROID INJECTION Right 2023    Procedure: Right L5 Transforaminal Epidural Steroid Injection;  Surgeon: Eze Byrd Jr., MD;  Location: [facility] ENDO;  Service: Pain Management;  Laterality: Right;  @0830 (given)  Eliquis last   Plavix last   Check BG    EPIDURAL STEROID INJECTION Right 10/11/2023    Procedure: Right L3 (infraneural) + S1 Transforaminal Epidural Steroid Injections;  Surgeon: Eze Byrd Jr., MD;  Location: [facility] ENDO;  Service: Pain Management;  Laterality: Right;  @0745 (requests morning)  Eliquis 10/7 & Plavix 10/5  Check BG    ESOPHAGOGASTRODUODENOSCOPY N/A 2023    Procedure: EGD (ESOPHAGOGASTRODUODENOSCOPY);  Surgeon: Anita Oswald MD;  Location: [facility] ENDO;  Service: Endoscopy;  Laterality: N/A;  Procedure Timin-4 weeks  Provider: Any GI provider  Location: No Preference  Additional Scheduling Information: Blood thinners  Prep  Specifications:N/A  9/1 ref. by Dr. Light, instr. to portal, , [facility] meds-st  ok to hold Plavix 5 days and Eliquis 2 day    INTRAOCULAR PROSTHESES INSERTION Left 2/24/2022    Procedure: INSERTION, IOL PROSTHESIS;  Surgeon: Nickolas Hong MD;  Location: [facility] OR;  Service: Ophthalmology;  Laterality: Left;    PHACOEMULSIFICATION OF CATARACT Left 2/24/2022    Procedure: PHACOEMULSIFICATION, CATARACT;  Surgeon: Nickolas Hong MD;  Location: [facility] OR;  Service: Ophthalmology;  Laterality: Left;  RN Phone Pre Op 2-16-22.  Covid NEGATIVE  2-23-22.  Arrival 08:00 am.    TONSILLECTOMY          Medications  Current Outpatient Medications on File Prior to Visit   Medication Sig Dispense Refill    albuterol (PROVENTIL/VENTOLIN HFA) 90 mcg/actuation inhaler Inhale 2 puffs into the lungs every 4 (four) hours as needed for Shortness of Breath. Rescue 17 g 3    apixaban (ELIQUIS) 5 mg Tab Take 1 tablet (5 mg total) by mouth 2 (two) times daily. 180 tablet 3    ascorbic acid, vitamin C, (VITAMIN C) 100 MG tablet Take 100 mg by mouth daily as needed.      atorvastatin (LIPITOR) 80 MG tablet Take 1 tablet (80 mg total) by mouth every evening. 90 tablet 3    azelastine (ASTELIN) 137 mcg (0.1 %) nasal spray 1 spray (137 mcg total) by Nasal route 2 (two) times daily. 30 mL 3    b complex vitamins tablet Take 1 tablet by mouth once daily.      BD ALCOHOL SWABS Pad       blood sugar diagnostic Strp Check blood glucose 2 times a day. True metrix. E11.65 200 strip 3    blood-glucose meter kit Test glucose 2-3x/day. True metrix 1 each 0    celecoxib (CELEBREX) 100 MG capsule Take 1 capsule (100 mg total) by mouth once daily. 30 capsule 6    clopidogreL (PLAVIX) 75 mg tablet Take 1 tablet (75 mg total) by mouth once daily. 90 tablet 3    coenzyme Q10 100 mg capsule Take 100 mg by mouth once daily.      dapagliflozin (FARXIGA) 5 mg Tab tablet Take 1 tablet (5 mg total) by mouth once daily. 30 tablet 3    dexamethasone sodium  "phosp/PF (DEXAMETHASONE SODIUM PHOS, PF,) 10 mg/mL Soln       ergocalciferol (VITAMIN D2) 50,000 unit Cap TAKE ONE CAPSULE BY MOUTH WEEKLY 12 capsule 2    fenofibrate 160 MG Tab TAKE 1 TABLET EVERY MORNING 90 tablet 12    fluticasone propionate (FLONASE) 50 mcg/actuation nasal spray 2 sprays (100 mcg total) by Each Nostril route 2 (two) times daily. 18.2 mL 3    furosemide (LASIX) 20 MG tablet TAKE 1 TABLET TWICE DAILY 180 tablet 3    gabapentin (NEURONTIN) 100 MG capsule TAKE 2 CAPSULES EVERY MORNING AND TAKE 3 CAPSULES EVERY DAY WITH DINNER 450 capsule 12    glimepiride (AMARYL) 1 MG tablet Take 1 tablet (1 mg total) by mouth before breakfast. 90 tablet 1    hydrALAZINE (APRESOLINE) 50 MG tablet Take 1 tablet (50 mg total) by mouth 2 (two) times a day. 180 tablet 3    insulin degludec (TRESIBA FLEXTOUCH U-100) 100 unit/mL (3 mL) insulin pen Inject 20 Units into the skin once daily. 30 mL 4    isosorbide mononitrate (IMDUR) 60 MG 24 hr tablet Take 1 tablet (60 mg total) by mouth once daily. 90 tablet 3    ketoconazole (NIZORAL) 2 % cream Apply topically once daily. 60 g 6    lancets Misc Check blood glucose 2x/day. True metrix. E11.65 200 each 3    magnesium 250 mg Tab Take 1 tablet by mouth once daily.       metFORMIN (GLUCOPHAGE-XR) 500 MG ER 24hr tablet Take 1 tablet with breakfast and 2 tablets with supper. 270 tablet 2    nitroGLYCERIN (NITROSTAT) 0.4 MG SL tablet Place 1 tablet (0.4 mg total) under the tongue every 5 (five) minutes as needed for Chest pain. 25 tablet 11    omega-3 acid ethyl esters (LOVAZA) 1 gram capsule Take 2 capsules (2 g total) by mouth 2 (two) times daily. 360 capsule 3    OMNIPAQUE 300 300 mg iodine/mL injection       pantoprazole (PROTONIX) 40 MG tablet TAKE 1 TABLET EVERY DAY 90 tablet 3    pen needle, diabetic 32 gauge x 1/4" Ndle Use daily 200 each 3    semaglutide (OZEMPIC) 2 mg/dose (8 mg/3 mL) PnIj Inject 2 mg into the skin every 7 days. 4 each 3    tiZANidine (ZANAFLEX) 4 MG " tablet Take 1 tablet (4 mg total) by mouth every 6 (six) hours as needed (pain). 360 tablet 3    triazolam (HALCION) 0.25 MG Tab TAKE 1 TABLET BY MOUTH EVERY NIGHT AT BEDTIME AS NEEDED 90 tablet 5    TRUEPLUS LANCETS 33 gauge [facility]        Current Facility-Administered Medications on File Prior to Visit   Medication Dose Route Frequency Provider Last Rate Last Admin    cyclopentolate 1% ophthalmic solution 1 drop  1 drop Left Eye On Call Procedure Nickolas Hong MD   1 drop at 02/24/22 0901    ofloxacin 0.3 % ophthalmic solution 1 drop  1 drop Left Eye On Call Procedure Nickolas Hong MD   2 drop at 02/24/22 1017    sodium chloride 0.9% flush 10 mL  10 mL Intravenous PRN Nickolas Hong MD        triamcinolone acetonide injection 40 mg  40 mg Intra-articular  Valerie Olivera MD   40 mg at 02/09/23 1030       Review of Systems  Review of Systems   Constitutional:  Positive for malaise/fatigue.   HENT:  Positive for hearing loss.    Eyes: Negative.    Respiratory:  Positive for cough.    Gastrointestinal:  Positive for constipation and diarrhea.   Musculoskeletal:  Positive for back pain.   Skin: Negative.    Neurological:  Positive for dizziness.   Endo/Heme/Allergies:  Bruises/bleeds easily.   Psychiatric/Behavioral:  Positive for depression.       Answers submitted by the patient for this visit:  Review of Symptoms Questionnaire  (Submitted on 1/22/2024)  Snoring?: Yes  Foot swelling?: Yes  Muscle aches / pain?: Yes  Food Allergies?: Yes  Cold all of the time? : Yes  Light-headedness: Yes    Social History   reports that he has never smoked. He has never been exposed to tobacco smoke. He has never used smokeless tobacco. He reports current alcohol use. He reports that he does not use drugs.     Family History  Family History   Problem Relation Age of Onset    Depression Mother     Heart disease Mother     Stroke Father     No Known Problems Sister     Diabetes Sister     No Known  "Problems Sister     Blindness Brother     No Known Problems Maternal Aunt     No Known Problems Maternal Uncle     No Known Problems Paternal Aunt     No Known Problems Paternal Uncle     No Known Problems Maternal Grandmother     No Known Problems Maternal Grandfather     No Known Problems Paternal Grandmother     No Known Problems Paternal Grandfather     Amblyopia Neg Hx     Cancer Neg Hx     Cataracts Neg Hx     Glaucoma Neg Hx     Hypertension Neg Hx     Macular degeneration Neg Hx     Retinal detachment Neg Hx     Strabismus Neg Hx     Thyroid disease Neg Hx         Physical Exam   There were no vitals filed for this visit. Body mass index is 27.41 kg/m².  Weight: 79.4 kg (175 lb)   Height: 5' 7" (170.2 cm)     GENERAL: no acute distress.  HEAD: normocephalic.   EYES: No scleral icterus  EARS: external ear without lesion, normal pinna shape and position.  External auditory canal with normal cerumen, tympanic membrane fully visible, no perforation , no retraction. No middle ear effusion. Ossicles intact.   NOSE: external nose without significant bony abnormality  ORAL CAVITY/OROPHARYNX: tongue mobile.   NECK: trachea midline.   LYMPH NODES:No cervical lymphadenopathy.  RESPIRATORY: no stridor, no stertor. Voice normal. Respirations nonlabored.  NEURO: alert, responds to questions appropriately.  Tuning forks today showed lateralize to the right again but ac over bc.   PSYCH:mood appropriate    AUDIOLOGY RESULTS  Audiometric evaluation including audiogram, tympanometry, acoustic reflexes, and speech discrimination which was performed 1-25-24 was personally reviewed and interpreted.  Notable findings on the audiogram were mild sloping to moderate sensorineural hearing loss (SNHL) bilaterally with asymmetry of right ear with mild snhl at 250 hz and normal hearing on left side.  Tympanometry revealed Type A tympanogram on the left and Type A/borderline C tympanogram on the right. Speech discrimination was 100%  on " the left, and 100% on the right.     Report of the audiologist performing this audiometric testing was also reviewed   8-2023 audio       audio    Imaging:  The patient does not have any new imaging of the head and neck since last visit.     Labs:  CBC  Recent Labs   Lab 06/09/22  0826 06/16/23  0953 06/24/23  2118   WBC 5.56 5.42 7.09   Hemoglobin 10.8 L 11.7 L 10.8 L   Hematocrit 36.7 L 41.4 37.0 L   MCV 77 L 69 L 69 L   Platelets 319 308 245     BMP  Recent Labs   Lab 11/11/22  0729 05/22/23  0906 06/16/23  0953 06/24/23  2118   Glucose 196 H  --  112 H 81   Sodium 139  --  140 140   Potassium 4.9  --  4.6 4.2   Chloride 104  --  101 107   CO2 24  --  29 23   BUN 25 H  --  27 H 25 H   Creatinine 1.4 1.5 H 1.5 H 1.4   Calcium 9.5  --  10.3 9.4     COAGS  Recent Labs   Lab 04/21/21  0921 05/10/21  0945 06/24/23  2217   INR 2.0 H 2.9 H 1.1       Assessment  1. Sensorineural hearing loss (SNHL) of left ear with restricted hearing of right ear    2. Hypertrophy of inferior nasal turbinate    3. Dysfunction of both eustachian tubes    4. Seasonal allergic rhinitis, unspecified trigger       Plan:  Discussed plan of care with patient in detail and all questions answered. Patient reported understanding of plan of care. I gave the patient the opportunity to ask questions and patient confirmed all questions answered to satisfaction.     Recheck hearing again in 6 months  Discussed mri   If still with diffference will get since having tinnitus. I recommended getting mri now because of the unilateral tinitus and persistent asymmetry . We again discussed ddx of this and about acoustic neuroma . he would like to hold off on mri   Also discussed with him about sudden hearing loss and need to notify me as can only do steroids within certain time frame  Does not want hearing aid at this time    F/u 6 months with hearing test sooner if issue    I spent a total of 30 minutes on the day of the visit.  This includes face to  face time and non-face to face time preparing to see the patient (eg, review of tests), obtaining and/or reviewing separately obtained history, documenting clinical information in the electronic or other health record, independently interpreting results and communicating results to the patient/family/caregiver, or care coordinator.   Please be aware that this note has been generated with the assistance of [employer] voice-to-text.  Please excuse any spelling or grammatical errors.

## 2024-01-25 NOTE — PROGRESS NOTES
Please click on link to view Audiogram:  Document on 1/25/2024 10:17 AM by Buffy Cornelius AU.D: Audiogram    Mr. Driss Hernandez Jr., a 73 y.o. male, was seen in the clinic today for a follow-up audiological evaluation. Previous audiogram from 8/18/23 indicated a mild to moderately severe mixed hearing loss (MHL) for the right ear and a mild to moderate high frequency sensorineural hearing loss (SNHL) for the left ear.     Otoscopy clear bilaterally. Tympanometry testing revealed a Type A tympanogram for the right ear and a Type A tympanogram for the left ear. Ipsilateral acoustic reflexes were present at 500 Hz for the right ear and were present at 1000 Hz for the left ear.    Audiological testing revealed a mild hearing loss at 250 Hz rising to normal hearing sloping to a mild to moderate SNHL at 5376-3131 Hz for the right ear. Audiological testing revealed a mild to moderate high frequency SNHL for the left ear. A speech reception threshold was obtained at 25 dBHL for the right ear and at 20 dBHL for the left ear. Speech discrimination was 100% for the right ear and 100% for the left ear.      Today's results were discussed with the patient. Patient expressed understanding of hearing test results and recommendations.    Recommendations:  1. Otologic evaluation  2. Annual audiological evaluation or sooner if hearing changes  3. Hearing protection when in noise   4. Hearing aid consultation following medical clearance  5. Utilize ReSound Relief imtiaz and other tinnitus management strategies discussed during appointment (lower salt/caffeine intake, monitor stress/anxiety levels, soft background noise in quiet)

## 2024-01-26 ENCOUNTER — PATIENT MESSAGE (OUTPATIENT)
Dept: PHARMACY | Facility: CLINIC | Age: 74
End: 2024-01-26
Payer: MEDICARE

## 2024-01-26 NOTE — TELEPHONE ENCOUNTER
We are pleased to inform you Driss Burttima Akbar has been approved in the [employer] Patient Assistance Program.  [employer] has shared this information with your patient.   Approval Details  Eligibility start Date: 01/13/2024  Eligibility end date: 12/31/2024 (Updated proof of eligibility required to re-enroll for 2025 calendar year).  Approved Medication: Tresiba  Day supply: 120 day supply   Allotted Refills: 3 refills) (Please be advised Program allows only 3 refills per eligibility period regardless of changes in strength).   Estimated Shipping: 10-14 business days.  Shipping Location: [facility] (You and your patient will receive further communication from an [facility] Rep once Medication is received)                                            Important Note  It is imperative that any changes to strength or discontinuation of this medication be referred to the Pharmacy Patient Assistance Team Pool Via [employer] to prevent Gaps in therapy.

## 2024-01-30 ENCOUNTER — OFFICE VISIT (OUTPATIENT)
Dept: ENDOCRINOLOGY | Facility: CLINIC | Age: 74
End: 2024-01-30
Payer: MEDICARE

## 2024-01-30 ENCOUNTER — PATIENT MESSAGE (OUTPATIENT)
Dept: ENDOCRINOLOGY | Facility: CLINIC | Age: 74
End: 2024-01-30

## 2024-01-30 VITALS
DIASTOLIC BLOOD PRESSURE: 48 MMHG | WEIGHT: 181 LBS | HEART RATE: 65 BPM | SYSTOLIC BLOOD PRESSURE: 140 MMHG | BODY MASS INDEX: 28.35 KG/M2 | TEMPERATURE: 98 F

## 2024-01-30 DIAGNOSIS — Z79.4 CONTROLLED TYPE 2 DIABETES MELLITUS WITH COMPLICATION, WITH LONG-TERM CURRENT USE OF INSULIN: Primary | ICD-10-CM

## 2024-01-30 DIAGNOSIS — N18.31 STAGE 3A CHRONIC KIDNEY DISEASE: ICD-10-CM

## 2024-01-30 DIAGNOSIS — E11.8 CONTROLLED TYPE 2 DIABETES MELLITUS WITH COMPLICATION, WITH LONG-TERM CURRENT USE OF INSULIN: Primary | ICD-10-CM

## 2024-01-30 DIAGNOSIS — I25.10 CORONARY ARTERY DISEASE INVOLVING NATIVE CORONARY ARTERY OF NATIVE HEART WITHOUT ANGINA PECTORIS: ICD-10-CM

## 2024-01-30 PROCEDURE — 95251 CONT GLUC MNTR ANALYSIS I&R: CPT | Mod: S$GLB,,, | Performed by: NURSE PRACTITIONER

## 2024-01-30 PROCEDURE — 99999 PR PBB SHADOW E&M-EST. PATIENT-LVL V: CPT | Mod: PBBFAC,,, | Performed by: NURSE PRACTITIONER

## 2024-01-30 PROCEDURE — 3008F BODY MASS INDEX DOCD: CPT | Mod: CPTII,S$GLB,, | Performed by: NURSE PRACTITIONER

## 2024-01-30 PROCEDURE — 99214 OFFICE O/P EST MOD 30 MIN: CPT | Mod: S$GLB,,, | Performed by: NURSE PRACTITIONER

## 2024-01-30 PROCEDURE — 3072F LOW RISK FOR RETINOPATHY: CPT | Mod: CPTII,S$GLB,, | Performed by: NURSE PRACTITIONER

## 2024-01-30 PROCEDURE — 1160F RVW MEDS BY RX/DR IN RCRD: CPT | Mod: CPTII,S$GLB,, | Performed by: NURSE PRACTITIONER

## 2024-01-30 PROCEDURE — 1159F MED LIST DOCD IN RCRD: CPT | Mod: CPTII,S$GLB,, | Performed by: NURSE PRACTITIONER

## 2024-01-30 PROCEDURE — 3077F SYST BP >= 140 MM HG: CPT | Mod: CPTII,S$GLB,, | Performed by: NURSE PRACTITIONER

## 2024-01-30 PROCEDURE — 3044F HG A1C LEVEL LT 7.0%: CPT | Mod: CPTII,S$GLB,, | Performed by: NURSE PRACTITIONER

## 2024-01-30 PROCEDURE — 3078F DIAST BP <80 MM HG: CPT | Mod: CPTII,S$GLB,, | Performed by: NURSE PRACTITIONER

## 2024-01-30 RX ORDER — METFORMIN HYDROCHLORIDE 500 MG/1
TABLET, EXTENDED RELEASE ORAL
Qty: 270 TABLET | Refills: 2 | Status: SHIPPED | OUTPATIENT
Start: 2024-01-30

## 2024-01-30 NOTE — PROGRESS NOTES
CC: This 73 y.o. White male  is here for evaluation of  T2DM along with comorbidities indicated in the Visit Diagnosis section of this encounter.    HPI: Driss Hernandez Jr. was diagnosed with T2DM in ~ 2005.   Pt was under care of Dr. Agrawal and TARI Werner NP, previously.   Medical history: CAD s/p CABG, atrial fibrillation, CHF, CKD      Prior visit 9/2023  A1c is down from 6.6 to 6.4%.   6 lb weight loss since lov.   Never got a call from DME re: [facility] CGM. Reports he's eating more sweets but less carbs at meal.   Plan Reduce glimepiride to 1 mg once daily before lunch.   Will send orders out for Dexcom G7.   Return to clinic in 4 months with labs prior.     Interval hx  A1c is down from 6.4 to 6.2%.   90 day CGM average 143.   He is taking glimepiride 1/2 tablet daily but unsure what dosage the tablet is.   He has gained 5 lb since lov. Ate more sweets during holidays and now with grey cake.       LAST DIABETES EDUCATION: never --     HOSPITALIZED FOR DIABETES  -  No.    DIABETES MEDICATION HX:   ozempic started 10/2020  Novolin R switched to glimepiride in Jan 2021     PRESCRIBED DIABETES MEDICATIONS:   Ozempic 2 mg once weekly  (obtains from [facility])  Tresiba  20 units hs (obtains from [facility])  Farxiga 5 mg daily   metformin  mg AM and 1000 mg PM   glimepiride 1 mg once daily around 3 pm before snack     Misses medication doses - No      DM COMPLICATIONS: nephropathy, peripheral neuropathy, and cardiovascular disease    SELF MONITORING BLOOD GLUCOSE: Checks blood glucose at home 2-3x/day.   CGM interpretation: glucoses overall at goal, some mild postprandial spikes in the evenings. No hypoglycemia.       HYPOGLYCEMIC EPISODES: rare. symptoms start < 70, infrequent.          CURRENT DIET: drinks water, 2% milk ~ 2 cup/day. Eats mostly just lunch and dinner. Lunch can be as late as 2 pm. Occasionally eats a protein bar in the AM.     drinks 2 cups coffee with milk with AS in  AM     CURRENT EXERCISE:  None     SOCIAL: his daughter is a palliative care NP      BP (!) 140/48   Pulse 65   Temp 98.2 °F (36.8 °C)   Wt 82.1 kg (181 lb)   BMI 28.35 kg/m²       ROS:   CONSTITUTIONAL: Appetite good, denies fatigue  Denies n/v;  + urgent BMs after meal, + constipation improved with increased fiber.        PHYSICAL EXAM:  GENERAL: Well developed, well nourished. No acute distress.   PSYCH: AAOx3, appropriate mood and affect, conversant, well-groomed. Judgement and insight good.   NEURO: Cranial nerves grossly intact. Speech clear, no tremor.   CHEST: Respirations even and unlabored.         Hemoglobin A1C   Date Value Ref Range Status   01/23/2024 6.2 (H) 4.0 - 5.6 % Final     Comment:     ADA Screening Guidelines:  5.7-6.4%  Consistent with prediabetes  >or=6.5%  Consistent with diabetes    High levels of fetal hemoglobin interfere with the HbA1C  assay. Heterozygous hemoglobin variants (HbS, HgC, etc)do  not significantly interfere with this assay.   However, presence of multiple variants may affect accuracy.     09/19/2023 6.4 (H) 4.0 - 5.6 % Final     Comment:     ADA Screening Guidelines:  5.7-6.4%  Consistent with prediabetes  >or=6.5%  Consistent with diabetes    High levels of fetal hemoglobin interfere with the HbA1C  assay. Heterozygous hemoglobin variants (HbS, HgC, etc)do  not significantly interfere with this assay.   However, presence of multiple variants may affect accuracy.     05/22/2023 6.6 (H) 4.0 - 5.6 % Final     Comment:     ADA Screening Guidelines:  5.7-6.4%  Consistent with prediabetes  >or=6.5%  Consistent with diabetes    High levels of fetal hemoglobin interfere with the HbA1C  assay. Heterozygous hemoglobin variants (HbS, HgC, etc)do  not significantly interfere with this assay.   However, presence of multiple variants may affect accuracy.     08/29/2019 7.7 % Final     Comment:     Ania Werner             Component Value Date/Time     06/24/2023 8177     K 4.2 06/24/2023 2118     06/24/2023 2118    CO2 23 06/24/2023 2118    BUN 25 (H) 06/24/2023 2118    CREATININE 1.4 06/24/2023 2118    GLU 81 06/24/2023 2118    CALCIUM 9.4 06/24/2023 2118    ALKPHOS 29 (L) 06/24/2023 2118    AST 22 06/24/2023 2118    ALT 15 06/24/2023 2118    BILITOT 0.4 06/24/2023 2118    EGFRNORACEVR 53 (A) 06/24/2023 2118    ESTGFRAFRICA 53.0 (A) 06/09/2022 0826         Lab Results   Component Value Date    LDLCALC 92.2 06/16/2023      Latest Reference Range & Units 06/16/23 09:53   Cholesterol 120 - 199 mg/dL 141   HDL 40 - 75 mg/dL 28 (L)   HDL/Cholesterol Ratio 20.0 - 50.0 % 19.9 (L)   LDL Cholesterol External 63.0 - 159.0 mg/dL 92.2   Non-HDL Cholesterol mg/dL 113   Total Cholesterol/HDL Ratio 2.0 - 5.0  5.0   Triglycerides 30 - 150 mg/dL 104   (L): Data is abnormally low    Lab Results   Component Value Date    MICALBCREAT 23.8 05/22/2023             ASSESSMENT and PLAN:    A1C GOAL: < 7.5 %     1. Controlled type 2 diabetes mellitus with complication, with long-term current use of insulin  Continue current meds.   Diabetes is well-controlled. Pt will send msg re: what dose his glimepride rx is so that we can update medical record.   Return to clinic in 6 months with labs prior.   Obtain yearly eye exam with Dr. Iva Orantes around April.     Hemoglobin A1C    Microalbumin/Creatinine Ratio, Urine    Creatinine, Serum      2. Coronary artery disease involving native coronary artery of native heart without angina pectoris  Lipid Panel      3. Stage 3a chronic kidney disease  Continue Farxiga.             Orders Placed This Encounter   Procedures    Hemoglobin A1C     Standing Status:   Future     Standing Expiration Date:   3/30/2025    Microalbumin/Creatinine Ratio, Urine     Standing Status:   Future     Standing Expiration Date:   3/30/2025     Order Specific Question:   Specimen Source     Answer:   Urine    Lipid Panel     Standing Status:   Future     Standing Expiration Date:    1/29/2025    Creatinine, Serum     Standing Status:   Future     Standing Expiration Date:   3/30/2025          Follow up in about 6 months (around 7/30/2024).

## 2024-01-30 NOTE — PATIENT INSTRUCTIONS
Continue current meds.   Diabetes is well-controlled. Pt will send msg re: what dose his glimepride rx is so that we can update medical record.   Return to clinic in 6 months with labs prior. Obtain yearly eye exam with Dr. Iva Orantes around April.

## 2024-02-06 DIAGNOSIS — E78.2 MIXED HYPERLIPIDEMIA: ICD-10-CM

## 2024-02-06 NOTE — TELEPHONE ENCOUNTER
No care due was identified.  [facility] Embedded Care Due Messages. Reference number: [id number].   2/06/2024 8:37:52 AM CST

## 2024-02-11 RX ORDER — OMEGA-3-ACID ETHYL ESTERS 1 G/1
2 CAPSULE, LIQUID FILLED ORAL 2 TIMES DAILY
Qty: 360 CAPSULE | Refills: 12 | Status: SHIPPED | OUTPATIENT
Start: 2024-02-11

## 2024-02-19 ENCOUNTER — PATIENT MESSAGE (OUTPATIENT)
Dept: ADMINISTRATIVE | Facility: HOSPITAL | Age: 74
End: 2024-02-19
Payer: MEDICARE

## 2024-02-20 ENCOUNTER — PATIENT OUTREACH (OUTPATIENT)
Dept: ADMINISTRATIVE | Facility: HOSPITAL | Age: 74
End: 2024-02-20
Payer: MEDICARE

## 2024-03-01 ENCOUNTER — PATIENT MESSAGE (OUTPATIENT)
Dept: ADMINISTRATIVE | Facility: HOSPITAL | Age: 74
End: 2024-03-01
Payer: MEDICARE

## 2024-03-19 ENCOUNTER — HOSPITAL ENCOUNTER (OUTPATIENT)
Dept: CARDIOLOGY | Facility: HOSPITAL | Age: 74
Discharge: HOME | End: 2024-03-19
Attending: INTERNAL MEDICINE
Payer: MEDICARE

## 2024-03-19 DIAGNOSIS — I65.23 ATHEROSCLEROSIS OF BOTH CAROTID ARTERIES: ICD-10-CM

## 2024-03-19 LAB
LEFT CBA DIAS: 7 CM/S
LEFT CBA SYS: 118 CM/S
LEFT CCA DIST DIAS: 12 CM/S
LEFT CCA DIST SYS: 77 CM/S
LEFT CCA MID DIAS: 9 CM/S
LEFT CCA MID SYS: 87 CM/S
LEFT CCA PROX DIAS: 8 CM/S
LEFT CCA PROX SYS: 83 CM/S
LEFT ECA DIAS: 1 CM/S
LEFT ECA SYS: 284 CM/S
LEFT ICA DIST DIAS: 21 CM/S
LEFT ICA DIST SYS: 99 CM/S
LEFT ICA MID DIAS: 27 CM/S
LEFT ICA MID SYS: 236 CM/S
LEFT ICA PROX DIAS: 40 CM/S
LEFT ICA PROX SYS: 212 CM/S
LEFT VERTEBRAL DIAS: 17 CM/S
LEFT VERTEBRAL SYS: 87 CM/S
OHS CV CAROTID RIGHT ICA EDV HIGHEST: 27
OHS CV CAROTID ULTRASOUND LEFT ICA/CCA RATIO: 3.06
OHS CV CAROTID ULTRASOUND RIGHT ICA/CCA RATIO: 2.14
OHS CV PV CAROTID LEFT HIGHEST CCA: 87
OHS CV PV CAROTID LEFT HIGHEST ICA: 236
OHS CV PV CAROTID RIGHT HIGHEST CCA: 91
OHS CV PV CAROTID RIGHT HIGHEST ICA: 195
OHS CV US CAROTID LEFT HIGHEST EDV: 40
RIGHT CBA DIAS: 15 CM/S
RIGHT CBA SYS: 112 CM/S
RIGHT CCA DIST DIAS: 9 CM/S
RIGHT CCA DIST SYS: 91 CM/S
RIGHT CCA MID DIAS: 11 CM/S
RIGHT CCA MID SYS: 83 CM/S
RIGHT CCA PROX DIAS: 9 CM/S
RIGHT CCA PROX SYS: 77 CM/S
RIGHT ECA DIAS: 2 CM/S
RIGHT ECA SYS: 295 CM/S
RIGHT ICA DIST DIAS: 21 CM/S
RIGHT ICA DIST SYS: 137 CM/S
RIGHT ICA MID DIAS: 19 CM/S
RIGHT ICA MID SYS: 138 CM/S
RIGHT ICA PROX DIAS: 27 CM/S
RIGHT ICA PROX SYS: 195 CM/S
RIGHT VERTEBRAL DIAS: 8 CM/S
RIGHT VERTEBRAL SYS: 64 CM/S

## 2024-03-19 PROCEDURE — 93880 EXTRACRANIAL BILAT STUDY: CPT | Mod: 26,,, | Performed by: INTERNAL MEDICINE

## 2024-03-19 PROCEDURE — 93880 EXTRACRANIAL BILAT STUDY: CPT

## 2024-03-20 ENCOUNTER — OFFICE VISIT (OUTPATIENT)
Dept: CARDIOLOGY | Facility: CLINIC | Age: 74
End: 2024-03-20
Payer: MEDICARE

## 2024-03-20 VITALS
HEART RATE: 60 BPM | WEIGHT: 176.81 LBS | DIASTOLIC BLOOD PRESSURE: 70 MMHG | RESPIRATION RATE: 18 BRPM | OXYGEN SATURATION: 98 % | HEIGHT: 67 IN | SYSTOLIC BLOOD PRESSURE: 152 MMHG | BODY MASS INDEX: 27.75 KG/M2

## 2024-03-20 DIAGNOSIS — I48.11 LONGSTANDING PERSISTENT ATRIAL FIBRILLATION: ICD-10-CM

## 2024-03-20 DIAGNOSIS — I10 ESSENTIAL HYPERTENSION: ICD-10-CM

## 2024-03-20 DIAGNOSIS — R00.1 SYMPTOMATIC BRADYCARDIA: Primary | ICD-10-CM

## 2024-03-20 DIAGNOSIS — I15.2 HYPERTENSION ASSOCIATED WITH DIABETES: ICD-10-CM

## 2024-03-20 DIAGNOSIS — E78.5 DYSLIPIDEMIA ASSOCIATED WITH TYPE 2 DIABETES MELLITUS: ICD-10-CM

## 2024-03-20 DIAGNOSIS — E11.69 DYSLIPIDEMIA ASSOCIATED WITH TYPE 2 DIABETES MELLITUS: ICD-10-CM

## 2024-03-20 DIAGNOSIS — I65.23 ATHEROSCLEROSIS OF BOTH CAROTID ARTERIES: ICD-10-CM

## 2024-03-20 DIAGNOSIS — Z95.1 HX OF CABG: ICD-10-CM

## 2024-03-20 DIAGNOSIS — Z79.01 LONG TERM (CURRENT) USE OF ANTICOAGULANTS: ICD-10-CM

## 2024-03-20 DIAGNOSIS — I25.10 CORONARY ARTERY DISEASE WITHOUT ANGINA PECTORIS, UNSPECIFIED VESSEL OR LESION TYPE, UNSPECIFIED WHETHER NATIVE OR TRANSPLANTED HEART: ICD-10-CM

## 2024-03-20 DIAGNOSIS — I73.9 PAD (PERIPHERAL ARTERY DISEASE): ICD-10-CM

## 2024-03-20 DIAGNOSIS — I50.32 CHRONIC HEART FAILURE WITH PRESERVED EJECTION FRACTION: ICD-10-CM

## 2024-03-20 DIAGNOSIS — I25.810 CORONARY ARTERY DISEASE INVOLVING CORONARY BYPASS GRAFT OF NATIVE HEART WITHOUT ANGINA PECTORIS: ICD-10-CM

## 2024-03-20 DIAGNOSIS — E11.59 HYPERTENSION ASSOCIATED WITH DIABETES: ICD-10-CM

## 2024-03-20 DIAGNOSIS — I73.9 PERIPHERAL VASCULAR DISEASE, UNSPECIFIED: ICD-10-CM

## 2024-03-20 PROBLEM — I25.709: Status: RESOLVED | Noted: 2023-02-09 | Resolved: 2024-03-20

## 2024-03-20 PROCEDURE — 99214 OFFICE O/P EST MOD 30 MIN: CPT | Mod: S$GLB,,, | Performed by: INTERNAL MEDICINE

## 2024-03-20 PROCEDURE — 3044F HG A1C LEVEL LT 7.0%: CPT | Mod: CPTII,S$GLB,, | Performed by: INTERNAL MEDICINE

## 2024-03-20 PROCEDURE — 1126F AMNT PAIN NOTED NONE PRSNT: CPT | Mod: CPTII,S$GLB,, | Performed by: INTERNAL MEDICINE

## 2024-03-20 PROCEDURE — 3078F DIAST BP <80 MM HG: CPT | Mod: CPTII,S$GLB,, | Performed by: INTERNAL MEDICINE

## 2024-03-20 PROCEDURE — 3077F SYST BP >= 140 MM HG: CPT | Mod: CPTII,S$GLB,, | Performed by: INTERNAL MEDICINE

## 2024-03-20 PROCEDURE — 3288F FALL RISK ASSESSMENT DOCD: CPT | Mod: CPTII,S$GLB,, | Performed by: INTERNAL MEDICINE

## 2024-03-20 PROCEDURE — 1159F MED LIST DOCD IN RCRD: CPT | Mod: CPTII,S$GLB,, | Performed by: INTERNAL MEDICINE

## 2024-03-20 PROCEDURE — 1160F RVW MEDS BY RX/DR IN RCRD: CPT | Mod: CPTII,S$GLB,, | Performed by: INTERNAL MEDICINE

## 2024-03-20 PROCEDURE — 3072F LOW RISK FOR RETINOPATHY: CPT | Mod: CPTII,S$GLB,, | Performed by: INTERNAL MEDICINE

## 2024-03-20 PROCEDURE — 3008F BODY MASS INDEX DOCD: CPT | Mod: CPTII,S$GLB,, | Performed by: INTERNAL MEDICINE

## 2024-03-20 PROCEDURE — 99999 PR PBB SHADOW E&M-EST. PATIENT-LVL V: CPT | Mod: PBBFAC,,, | Performed by: INTERNAL MEDICINE

## 2024-03-20 PROCEDURE — 1101F PT FALLS ASSESS-DOCD LE1/YR: CPT | Mod: CPTII,S$GLB,, | Performed by: INTERNAL MEDICINE

## 2024-03-20 RX ORDER — ISOSORBIDE MONONITRATE 120 MG/1
120 TABLET, EXTENDED RELEASE ORAL DAILY
Qty: 90 TABLET | Refills: 3 | Status: SHIPPED | OUTPATIENT
Start: 2024-03-20

## 2024-03-20 NOTE — PROGRESS NOTES
CARDIOVASCULAR PROGRESS NOTE    REASON FOR CONSULT:   Driss Hernandez Jr. is a 73 y.o. male who presents for follow up of CAD, HFpEF, Chr AF.    PCP: Ehrensing  Ortho: [facility]  HISTORY OF PRESENT ILLNESS:   The patient returns for follow up.  His main complaint today is fatigue.  He denies any angina, dyspnea, palpitations, or syncope.  There has been no PND, orthopnea, melena, hematuria, or claudication symptoms.  He continues take his Eliquis anticoagulation without any issues.  I reviewed the results of his carotid ultrasound noting predominantly stable bilateral carotid atherosclerosis.  His EKG today notes atrial fibrillation with a heart rate of 30.  He is not on any rate-controlling medications.    CARDIOVASCULAR HISTORY:   CAD 2016 CABG x3 (LIMA-LAD, SVG-D, SVG-PDA)   11/11/20 cath with diffuse native disease, patent SVG x2, LIMA-LAD atretic   12/10/20: PCI OM2 2.25x26 Resolute Isiah RICO    HFpEF    PAD    Chr AF on eliquis 5mg bid    Carotid atherosclerosis, bilat (US 3/2024)    PAST MEDICAL HISTORY:     Past Medical History:   Diagnosis Date    Chronic heart failure with preserved ejection fraction 2/9/2023    Chronic midline low back pain without sciatica 10/2/2017    Colon polyps     Coronary artery disease involving native coronary artery of native heart 3/5/2020    Coronary artery disease involving native coronary artery of native heart with angina pectoris 12/10/2020    Diabetes mellitus with neurological manifestations, uncontrolled 1/24/2017    Diabetic polyneuropathy associated with type 2 diabetes mellitus 1/24/2017    Diabetic polyneuropathy associated with type 2 diabetes mellitus     Essential hypertension 1/24/2017    Gastroesophageal reflux disease 1/24/2017    Hyperlipidemia 1/24/2017    Insomnia 1/24/2017    Long-term insulin use 1/24/2017    Longstanding persistent atrial fibrillation 12/30/2020    Lumbar spondylosis 11/13/2017    Nuclear sclerosis of both eyes 8/24/2017    Obesity      PAD (peripheral artery disease) 3/23/2022    Stage 3 chronic kidney disease 2/13/2019    Uncontrolled type 2 diabetes mellitus without complication, with long-term current use of insulin 1/24/2017       PAST SURGICAL HISTORY:     Past Surgical History:   Procedure Laterality Date    ARTHROSCOPIC REPAIR OF ROTATOR CUFF OF SHOULDER Right 7/5/2022    Procedure: REPAIR, ROTATOR CUFF, ARTHROSCOPIC;  Surgeon: Valerie Olivera MD;  Location: [facility] OR;  Service: Orthopedics;  Laterality: Right;  ANAYELI WELLER NEPHJENNIFER LEMUS [phone]/ TEXTED ALLAN ON 6/23/2022 @ 9:47AM. ALLAN RESPONDED ON 6/23/2022 @ 10:33AM-LO  JEAN PAUL BABIN [phone] MY BE HERE FOR CASE SPOKE TO HIM @ 9:59AM ON 7-1-2022  RN PREOP 6/28/2022    BACK SURGERY      2002    CATARACT EXTRACTION W/  INTRAOCULAR LENS IMPLANT Right 08/29/2017    Dr. Hong    CATARACT EXTRACTION W/  INTRAOCULAR LENS IMPLANT Left 02/24/2022    Dr. Hong    COLONOSCOPY N/A 2/21/2017    Procedure: COLONOSCOPY;  Surgeon: Robb Rosado MD;  Location: [facility] ENDO;  Service: Endoscopy;  Laterality: N/A;    COLONOSCOPY N/A 7/5/2023    Procedure: COLONOSCOPY;  Surgeon: Aston Varela MD;  Location: [facility] ENDO;  Service: Endoscopy;  Laterality: N/A;  Referral: JOVANNI SCANLON  ok to hold Plavix 5 days and Eliquis 2 days per Dr Purdy-OFELIA   Meds  Suprep   Inst portal   LW    CORONARY ANGIOGRAPHY INCLUDING BYPASS GRAFTS WITH CATHETERIZATION OF LEFT HEART N/A 11/11/2020    Procedure: ANGIOGRAM, CORONARY, INCLUDING BYPASS GRAFT, WITH LEFT HEART CATHETERIZATION;  Surgeon: Lane Cuellar MD;  Location: [facility] CATH LAB;  Service: Cardiology;  Laterality: N/A;  RN PRE OP 11-5-2020. --COVID NEGATIVE ON  11-. C A    CORONARY ARTERY BYPASS GRAFT      March 2016    EPIDURAL STEROID INJECTION Bilateral 11/14/2018    Procedure: Lumbar Medial Branch Blocks;  Surgeon: Eze Byrd Jr., MD;  Location: [facility] ENDO;  Service: Pain Management;  Laterality: Bilateral;  Bilateral  Lumbar Medial Branch Blocks L2, L3, L4, L5    [id number]  [id number]    Arrive @ 1150; NO Sedation    EPIDURAL STEROID INJECTION Bilateral 2018    Procedure: Injection, Steroid, Epidural;  Surgeon: Eze Byrd Jr., MD;  Location: [facility] ENDO;  Service: Pain Management;  Laterality: Bilateral;  Bilateral Sacroiliac Joint Steroid Injections    [id number]    Arrive @ 0910    EPIDURAL STEROID INJECTION Bilateral 3/20/2019    Procedure: Injection, Steroid, Sacroiliiac Joint;  Surgeon: Eze Byrd Jr., MD;  Location: [facility] ENDO;  Service: Pain Management;  Laterality: Bilateral;  Bilateral Sacroiliac Joint Steroid Injections    [id number]    Arrive @ 1145; Trigger point injections also?    EPIDURAL STEROID INJECTION Right 2023    Procedure: Right L5 Transforaminal Epidural Steroid Injection;  Surgeon: Eze Byrd Jr., MD;  Location: [facility] ENDO;  Service: Pain Management;  Laterality: Right;  @0830 (given)  Eliquis last   Plavix last   Check BG    EPIDURAL STEROID INJECTION Right 10/11/2023    Procedure: Right L3 (infraneural) + S1 Transforaminal Epidural Steroid Injections;  Surgeon: Eze Byrd Jr., MD;  Location: [facility] ENDO;  Service: Pain Management;  Laterality: Right;  @0745 (requests morning)  Eliquis 10/7 & Plavix 10/5  Check BG    ESOPHAGOGASTRODUODENOSCOPY N/A 2023    Procedure: EGD (ESOPHAGOGASTRODUODENOSCOPY);  Surgeon: Anita Oswald MD;  Location: [facility] ENDO;  Service: Endoscopy;  Laterality: N/A;  Procedure Timin-4 weeks  Provider: Any GI provider  Location: No Preference  Additional Scheduling Information: Blood thinners  Prep Specifications:N/A   ref. by Dr. Light, instr. to portal, ,  meds-st  ok to hold Plavix 5 days and Eliquis 2 day    INTRAOCULAR PROSTHESES INSERTION Left 2022    Procedure: INSERTION, IOL PROSTHESIS;  Surgeon: Nickolas Hong MD;  Location: [facility];  Service: Ophthalmology;  Laterality: Left;    PHACOEMULSIFICATION OF  CATARACT Left 2/24/2022    Procedure: PHACOEMULSIFICATION, CATARACT;  Surgeon: Nickolas Hong MD;  Location: [facility];  Service: Ophthalmology;  Laterality: Left;  RN Phone Pre Op 2-16-22.  Covid NEGATIVE  2-23-22.  Arrival 08:00 am.    TONSILLECTOMY         ALLERGIES AND MEDICATION:     Review of patient's allergies indicates:   Allergen Reactions    Penicillins Other (See Comments)     Unknown reaction, Had a reaction as a child    Shrimp Itching     Hand itching        Medication List            Accurate as of March 20, 2024  9:17 AM. If you have any questions, ask your nurse or doctor.                CONTINUE taking these medications      albuterol 90 mcg/actuation inhaler  Commonly known as: PROVENTIL/VENTOLIN HFA  Inhale 2 puffs into the lungs every 4 (four) hours as needed for Shortness of Breath. Rescue     apixaban 5 mg Tab  Commonly known as: ELIQUIS  Take 1 tablet (5 mg total) by mouth 2 (two) times daily.     ascorbic acid (vitamin C) 100 MG tablet  Commonly known as: VITAMIN C     atorvastatin 80 MG tablet  Commonly known as: LIPITOR  Take 1 tablet (80 mg total) by mouth every evening.     azelastine 137 mcg (0.1 %) nasal spray  Commonly known as: ASTELIN  1 spray (137 mcg total) by Nasal route 2 (two) times daily.     b complex vitamins tablet     BD ALCOHOL SWABS Padm  Generic drug: alcohol swabs     blood sugar diagnostic Strp  Check blood glucose 2 times a day. True metrix. E11.65     blood-glucose meter kit  Test glucose 2-3x/day. True metrix     celecoxib 100 MG capsule  Commonly known as: CeleBREX  Take 1 capsule (100 mg total) by mouth once daily.     clopidogreL 75 mg tablet  Commonly known as: PLAVIX  Take 1 tablet (75 mg total) by mouth once daily.     coenzyme Q10 100 mg capsule     dapagliflozin propanediol 5 mg Tab tablet  Commonly known as: Farxiga  Take 1 tablet (5 mg total) by mouth once daily.     dexAMETHasone sodium phos (PF) 10 mg/mL Soln     ergocalciferol 50,000 unit  "Cap  Commonly known as: VITAMIN D2  TAKE ONE CAPSULE BY MOUTH WEEKLY     fenofibrate 160 MG Tab  TAKE 1 TABLET EVERY MORNING     fluticasone propionate 50 mcg/actuation nasal spray  Commonly known as: FLONASE  2 sprays (100 mcg total) by Each Nostril route 2 (two) times daily.     furosemide 20 MG tablet  Commonly known as: LASIX  TAKE 1 TABLET TWICE DAILY     gabapentin 100 MG capsule  Commonly known as: NEURONTIN  TAKE 2 CAPSULES EVERY MORNING AND TAKE 3 CAPSULES EVERY DAY WITH DINNER     glimepiride 1 MG tablet  Commonly known as: AMARYL  Take 1 tablet (1 mg total) by mouth before breakfast.     hydrALAZINE 50 MG tablet  Commonly known as: APRESOLINE  Take 1 tablet (50 mg total) by mouth 2 (two) times a day.     insulin degludec 100 unit/mL (3 mL) insulin pen  Commonly known as: TRESIBA FLEXTOUCH U-100  Inject 20 Units into the skin once daily.     isosorbide mononitrate 60 MG 24 hr tablet  Commonly known as: IMDUR  Take 1 tablet (60 mg total) by mouth once daily.     ketoconazole 2 % cream  Commonly known as: NIZORAL  Apply topically once daily.     * lancets Misc  Check blood glucose 2x/day. True metrix. E11.65     * TRUEPLUS LANCETS 33 gauge Misc  Generic drug: lancets     magnesium 250 mg Tab     metFORMIN 500 MG ER 24hr tablet  Commonly known as: GLUCOPHAGE-XR  Take 1 tablet with breakfast and 2 tablets with supper.     nitroGLYCERIN 0.4 MG SL tablet  Commonly known as: NITROSTAT  Place 1 tablet (0.4 mg total) under the tongue every 5 (five) minutes as needed for Chest pain.     NOVOFINE 32 32 gauge x 1/4" Ndle  Generic drug: pen needle, diabetic  Use daily     omega-3 acid ethyl esters 1 gram capsule  Commonly known as: LOVAZA  Take 2 capsules (2 g total) by mouth 2 (two) times daily.     OMNIPAQUE 300 300 mg iodine/mL injection  Generic drug: iohexoL     OZEMPIC 2 mg/dose (8 mg/3 mL) Pnij  Generic drug: semaglutide  Inject 2 mg into the skin every 7 days.     pantoprazole 40 MG tablet  Commonly known as: " PROTONIX  TAKE 1 TABLET EVERY DAY     tiZANidine 4 MG tablet  Commonly known as: ZANAFLEX  Take 1 tablet (4 mg total) by mouth every 6 (six) hours as needed (pain).     triazolam 0.25 MG Tab  Commonly known as: HALCION  TAKE 1 TABLET BY MOUTH EVERY NIGHT AT BEDTIME AS NEEDED           * This list has 2 medication(s) that are the same as other medications prescribed for you. Read the directions carefully, and ask your doctor or other care provider to review them with you.                  SOCIAL HISTORY:     Social History     Socioeconomic History    Marital status:    Tobacco Use    Smoking status: Never     Passive exposure: Never    Smokeless tobacco: Never   Substance and Sexual Activity    Alcohol use: Yes     Comment: rare occassion    Drug use: No    Sexual activity: Yes     Partners: Female     Social Determinants of Health     Financial Resource Strain: Low Risk  (1/22/2024)    Overall Financial Resource Strain (CARDIA)     Difficulty of Paying Living Expenses: Not hard at all   Food Insecurity: No Food Insecurity (1/22/2024)    Hunger Vital Sign     Worried About Running Out of Food in the Last Year: Never true     Ran Out of Food in the Last Year: Never true   Transportation Needs: No Transportation Needs (1/22/2024)    PRAPARE - Transportation     Lack of Transportation (Medical): No     Lack of Transportation (Non-Medical): No   Physical Activity: Insufficiently Active (1/22/2024)    Exercise Vital Sign     Days of Exercise per Week: 3 days     Minutes of Exercise per Session: 30 min   Stress: Stress Concern Present (1/22/2024)    [employer] [place] [employer] - Occupational Stress Questionnaire     Feeling of Stress : To some extent   Social Connections: Unknown (1/22/2024)    Social Connection and Isolation Panel [NHANES]     Frequency of Communication with Friends and Family: More than three times a week     Frequency of Social Gatherings with Friends and Family: Three times a week      Active Member of Clubs or Organizations: Yes     Attends Club or Organization Meetings: More than 4 times per year     Marital Status:    Housing Stability: Low Risk  (1/22/2024)    Housing Stability Vital Sign     Unable to Pay for Housing in the Last Year: No     Number of Places Lived in the Last Year: 1     Unstable Housing in the Last Year: No       FAMILY HISTORY:     Family History   Problem Relation Age of Onset    Depression Mother     Heart disease Mother     Stroke Father     No Known Problems Sister     Diabetes Sister     No Known Problems Sister     Blindness Brother     No Known Problems Maternal Aunt     No Known Problems Maternal Uncle     No Known Problems Paternal Aunt     No Known Problems Paternal Uncle     No Known Problems Maternal Grandmother     No Known Problems Maternal Grandfather     No Known Problems Paternal Grandmother     No Known Problems Paternal Grandfather     Amblyopia Neg Hx     Cancer Neg Hx     Cataracts Neg Hx     Glaucoma Neg Hx     Hypertension Neg Hx     Macular degeneration Neg Hx     Retinal detachment Neg Hx     Strabismus Neg Hx     Thyroid disease Neg Hx        REVIEW OF SYSTEMS:   Review of Systems   Constitutional:  Positive for malaise/fatigue. Negative for chills, diaphoresis and fever.   HENT:  Negative for nosebleeds.    Eyes:  Negative for blurred vision, double vision and photophobia.   Respiratory:  Negative for cough, hemoptysis, shortness of breath and wheezing.    Cardiovascular:  Negative for chest pain, palpitations, orthopnea, claudication, leg swelling and PND.   Gastrointestinal:  Negative for abdominal pain, blood in stool, heartburn, melena, nausea and vomiting.   Genitourinary:  Negative for flank pain and hematuria.   Musculoskeletal:  Negative for falls, joint pain, myalgias and neck pain.   Skin:  Negative for rash.   Neurological:  Negative for dizziness, seizures, loss of consciousness, weakness and headaches.  "  Endo/Heme/Allergies:  Negative for polydipsia. Does not bruise/bleed easily.   Psychiatric/Behavioral:  Negative for depression and memory loss. The patient is not nervous/anxious.        PHYSICAL EXAM:     Vitals:    03/20/24 0904   BP: (!) 152/70   Pulse: 60   Resp: 18    Body mass index is 27.69 kg/m².  Weight: 80.2 kg (176 lb 12.9 oz)   Height: 5' 7" (170.2 cm)     Physical Exam  Vitals reviewed.   Constitutional:       General: He is not in acute distress.     Appearance: Normal appearance. He is well-developed. He is not ill-appearing, toxic-appearing or diaphoretic.   HENT:      Head: Normocephalic and atraumatic.   Eyes:      General: No scleral icterus.     Extraocular Movements: Extraocular movements intact.      Conjunctiva/sclera: Conjunctivae normal.      Pupils: Pupils are equal, round, and reactive to light.   Neck:      Thyroid: No thyromegaly.      Vascular: Normal carotid pulses. No carotid bruit or JVD.      Trachea: Trachea normal.   Cardiovascular:      Rate and Rhythm: Bradycardia present. Rhythm irregularly irregular.      Heart sounds: S1 normal and S2 normal. Heart sounds are distant. No murmur heard.     No friction rub. No gallop.   Pulmonary:      Effort: Pulmonary effort is normal. No respiratory distress.      Breath sounds: Normal breath sounds. No stridor. No wheezing, rhonchi or rales.   Chest:      Chest wall: No tenderness.   Abdominal:      General: There is no distension.      Palpations: Abdomen is soft.   Musculoskeletal:         General: No swelling or tenderness.      Cervical back: Normal range of motion and neck supple. No edema or rigidity.      Right lower leg: No edema.      Left lower leg: No edema.      Comments: R shoulder limited ROM   Feet:      Right foot:      Skin integrity: No ulcer.      Left foot:      Skin integrity: No ulcer.   Skin:     General: Skin is warm and dry.      Coloration: Skin is not jaundiced.   Neurological:      General: No focal deficit " present.      Mental Status: He is alert and oriented to person, place, and time.      Cranial Nerves: No cranial nerve deficit.   Psychiatric:         Mood and Affect: Mood normal.         Speech: Speech normal.         Behavior: Behavior normal. Behavior is cooperative.         DATA:   EKG: (personally reviewed tracing(s))  3/20/24 AF 39    Laboratory:  CBC:  Recent Labs   Lab 06/09/22  0826 06/16/23  0953 06/24/23 2118   WBC 5.56 5.42 7.09   Hemoglobin 10.8 L 11.7 L 10.8 L   Hematocrit 36.7 L 41.4 37.0 L   Platelets 319 308 245         CHEMISTRIES:  Recent Labs   Lab 04/28/22  0923 05/11/22  0722 06/09/22  0826 08/11/22  0931 11/11/22  0729 05/22/23  0906 06/16/23  0953 06/24/23 2118   Glucose 146 H  --  126 H   < > 196 H  --  112 H 81   Sodium 141  --  143   < > 139  --  140 140   Potassium 4.9  --  4.9   < > 4.9  --  4.6 4.2   BUN 30 H  --  33 H   < > 25 H  --  27 H 25 H   Creatinine 1.5 H 1.5 H 1.5 H   < > 1.4 1.5 H 1.5 H 1.4   eGFR if  53 A 53.0 A 53.0 A  --   --   --   --   --    eGFR if non  46 A 45.9 A 45.9 A  --   --   --   --   --    Calcium 9.7  --  10.2   < > 9.5  --  10.3 9.4    < > = values in this interval not displayed.         CARDIAC BIOMARKERS:  Recent Labs   Lab 06/24/23 2118 06/24/23  2329   Troponin I 0.050 H 0.054 H         COAGS:  Recent Labs   Lab 04/21/21  0921 05/10/21  0945 06/24/23  2217   INR 2.0 H 2.9 H 1.1         LIPIDS/LFTS:  Recent Labs   Lab 05/11/22  0722 06/09/22  0826 09/13/22  0658 06/16/23  0953 06/24/23 2118   Cholesterol 123  --  133 141  --    Triglycerides 116  --  144 104  --    HDL 22 L  --  25 L 28 L  --    LDL Cholesterol 77.8  --  79.2 92.2  --    Non-HDL Cholesterol 101  --  108 113  --    AST  --    < > 18 18 22   ALT  --    < > 12 13 15    < > = values in this interval not displayed.         Cardiovascular Testing:  Carotid US 3/19/24    There is 40-49% right Internal Carotid Stenosis.    There is 50-59% left Internal Carotid  Stenosis.    >50% bilat ECA stenosis.    Compared with prior report dated 9/19/23: L ICA stenosis has progressed.    Holter 3/13/23  Atrial fibrillation  Heart rates varied between 32 and 99 BPM with an average of 58 BPM.  There were frequent PVCs.  Diary entries correlate with heart rates between 45-61bpm.    Ex MPI 3/13/23     The patient exercised for 8 minutes 16 seconds on a Marcial protocol, corresponding to a functional capacity of 9 METS, achieving a peak heart rate of 127 bpm, which is 86 % of the age predicted maximum heart rate. Their exercise capacity was above average.    Normal myocardial perfusion scan. There is no evidence of myocardial ischemia or infarction.    There is a mild to moderate intensity perfusion abnormality in the anteroseptal wall of the left ventricle consistent with the RV insertion site.    The gated perfusion images showed an ejection fraction of 66% post stress.    There is normal wall motion post stress.    The ECG portion of the study is negative for ischemia.    The patient reported no chest pain during the stress test.    During stress, rare PVCs are noted.    The exercise capacity was above average.    LE venous US 4/20/22  There is no evidence of a right lower extremity DVT.  There is no evidence of a left lower extremity DVT.  1.2x0.6cm non vascular structure noted in left proximal calf.    Echo 3/30/22  The left ventricle is normal in size with mild concentric hypertrophy and normal systolic function.  The estimated ejection fraction is 60%.  Mild right ventricular enlargement with low normal right ventricular systolic function.  Mild mitral regurgitation.  Mild tricuspid regurgitation.  The estimated PA systolic pressure is 22 mmHg.  Atrial fibrillation observed.    PCI OM1 12/10/20  1.  90% mid OM2 stenosis.  2.  Successful PCI with placement of a 2.25 x 26 mm drug-eluting stent reducing the 90% stenosis to 0%.  INGRIS 3 flow.  No dissection.  Good angiographic  results.    Cath 11/11/20   Left Main   The vessel is angiographically normal.   Left Anterior Descending   Subtotal mid occlusion. Diffusely diseased distal vessel   Left Circumflex   Long mid 80% between OM1 and OM2   First Obtuse Marginal Branch   90% mid   Second Obtuse Marginal Branch   80% mid   Right Coronary Artery   80% mid - moderate disffuse distal disease   LIMA Graft To Mid LAD   Mid to distal graft diffusely diseased 0 multile 80-90% lesions. Small diffusely diseased distal LAD   Saphenous Graft To RPDA   Patent with good runoff to PDA   Graft To 1st Diag   Patent to D1 with good runoff     Ex NUSRAT 4/18/18  Resting NUSRAT:   The right ankle brachial index was 1.04 which is normal.   The left ankle brachial index was 1.08 which is normal.   The right TBI is 0.55.   The left TBI is 0.98.   Exercise NUSRAT:   Post exercise right ankle pressure was 113 mmHg, resulting in a right post-exercise NUSRAT of 0.67.   Post exercise left ankle pressure was 150 mmHg, resulting in a left post-exercise NUSRAT of 0.89.       ASSESSMENT:   # Chr AF on eliquis 5mg bid, HR controlled (?too slow).  Holter 3/2023 OK, no evidence of chronotropic incompetence on Ex MPI 3/2023. Complaining of fatigue, not on any BBL/CCB, ?needs PPM.  # CAD s/p CABG/PCI OM 12/2020, asymtomatic.  MPI 3/2023 neg.  # HTN, uncontrolled  # HFpEF, euvolemic  # CKD3a, elev creat/K+ with losartan, normalized when stopped losartan.  # HLP on atorva 80mg  # PAD, abnl NUSRAT 4/18/18  # DM  # carotid atherosclerosis (CUS 9/2023)  # L>R LE edema, resolved.  LE venous US 4/2022 neg.  # aortic atherosclerosis (CT Chest 10/29/18)    PLAN:   Cont med rx  Cont Plavix 75mg qd  Cont eliquis 5mg bid  Inc imdur 120mg qd  Check holter and echo, ?needs EPS erefrral for PPM  RTC 1 month (Apr 2024)  Surveillance Carotid US 6 months (Sept 2024)    Eze Purdy MD, [facility]

## 2024-04-02 ENCOUNTER — HOSPITAL ENCOUNTER (OUTPATIENT)
Dept: CARDIOLOGY | Facility: HOSPITAL | Age: 74
Discharge: HOME | End: 2024-04-02
Attending: INTERNAL MEDICINE
Payer: MEDICARE

## 2024-04-02 VITALS — WEIGHT: 176 LBS | BODY MASS INDEX: 27.62 KG/M2 | HEIGHT: 67 IN

## 2024-04-02 DIAGNOSIS — R00.1 SYMPTOMATIC BRADYCARDIA: ICD-10-CM

## 2024-04-02 DIAGNOSIS — I48.11 LONGSTANDING PERSISTENT ATRIAL FIBRILLATION: ICD-10-CM

## 2024-04-02 LAB
ASCENDING AORTA: 3.25 CM
AV INDEX (PROSTH): 0.75
AV MEAN GRADIENT: 4 MMHG
AV PEAK GRADIENT: 7 MMHG
AV VALVE AREA BY VELOCITY RATIO: 2.07 CM²
AV VALVE AREA: 2.39 CM²
AV VELOCITY RATIO: 0.65
BSA FOR ECHO PROCEDURE: 1.94 M2
CV ECHO LV RWT: 0.57 CM
DOP CALC AO PEAK VEL: 1.33 M/S
DOP CALC AO VTI: 25.2 CM
DOP CALC LVOT AREA: 3.2 CM2
DOP CALC LVOT DIAMETER: 2.01 CM
DOP CALC LVOT PEAK VEL: 0.87 M/S
DOP CALC LVOT STROKE VOLUME: 60.26 CM3
DOP CALCLVOT PEAK VEL VTI: 19 CM
ECHO LV POSTERIOR WALL: 1.25 CM (ref 0.6–1.1)
FRACTIONAL SHORTENING: 26 % (ref 28–44)
INTERVENTRICULAR SEPTUM: 1.28 CM (ref 0.6–1.1)
IVC DIAMETER: 2.09 CM
IVRT: 83.73 MSEC
LA MAJOR: 5.92 CM
LA MINOR: 5.99 CM
LA WIDTH: 3.9 CM
LEFT ATRIUM SIZE: 5.27 CM
LEFT ATRIUM VOLUME INDEX: 54.2 ML/M2
LEFT ATRIUM VOLUME: 104.03 CM3
LEFT INTERNAL DIMENSION IN SYSTOLE: 3.22 CM (ref 2.1–4)
LEFT VENTRICLE DIASTOLIC VOLUME INDEX: 44.91 ML/M2
LEFT VENTRICLE DIASTOLIC VOLUME: 86.23 ML
LEFT VENTRICLE MASS INDEX: 107 G/M2
LEFT VENTRICLE SYSTOLIC VOLUME INDEX: 21.6 ML/M2
LEFT VENTRICLE SYSTOLIC VOLUME: 41.46 ML
LEFT VENTRICULAR INTERNAL DIMENSION IN DIASTOLE: 4.37 CM (ref 3.5–6)
LEFT VENTRICULAR MASS: 204.49 G
LVOT MG: 1.54 MMHG
LVOT MV: 0.59 CM/S
MV PEAK E VEL: 0.93 M/S
PISA TR MAX VEL: 1.89 M/S
PV PEAK GRADIENT: 4 MMHG
PV PEAK VELOCITY: 0.98 M/S
RA MAJOR: 5.45 CM
RA PRESSURE ESTIMATED: 15 MMHG
RA WIDTH: 4.2 CM
RIGHT VENTRICULAR END-DIASTOLIC DIMENSION: 3.7 CM
RV TB RVSP: 17 MMHG
RV TISSUE DOPPLER FREE WALL SYSTOLIC VELOCITY 1 (APICAL 4 CHAMBER VIEW): 9.38 CM/S
SINUS: 3.13 CM
STJ: 1.65 CM
TR MAX PG: 14 MMHG
TRICUSPID ANNULAR PLANE SYSTOLIC EXCURSION: 1.57 CM
TV REST PULMONARY ARTERY PRESSURE: 29 MMHG
Z-SCORE OF LEFT VENTRICULAR DIMENSION IN END DIASTOLE: -2.15
Z-SCORE OF LEFT VENTRICULAR DIMENSION IN END SYSTOLE: -0.28

## 2024-04-02 PROCEDURE — 93306 TTE W/DOPPLER COMPLETE: CPT

## 2024-04-02 PROCEDURE — 93306 TTE W/DOPPLER COMPLETE: CPT | Mod: 26,,, | Performed by: INTERNAL MEDICINE

## 2024-04-02 PROCEDURE — 93227 XTRNL ECG REC<48 HR R&I: CPT | Mod: ,,, | Performed by: INTERNAL MEDICINE

## 2024-04-02 PROCEDURE — 93226 XTRNL ECG REC<48 HR SCAN A/R: CPT

## 2024-04-03 LAB
OHS CV EVENT MONITOR DAY: 1
OHS CV HOLTER LENGTH DECIMAL HOURS: 48
OHS CV HOLTER LENGTH HOURS: 24
OHS CV HOLTER LENGTH MINUTES: 0
OHS CV HOLTER SINUS AVERAGE HR: 57
OHS CV HOLTER SINUS MAX HR: 103
OHS CV HOLTER SINUS MIN HR: 31

## 2024-04-18 ENCOUNTER — TELEPHONE (OUTPATIENT)
Dept: ENDOCRINOLOGY | Facility: CLINIC | Age: 74
End: 2024-04-18
Payer: MEDICARE

## 2024-04-18 DIAGNOSIS — E11.65 TYPE 2 DIABETES MELLITUS WITH HYPERGLYCEMIA, WITH LONG-TERM CURRENT USE OF INSULIN: ICD-10-CM

## 2024-04-18 DIAGNOSIS — Z79.4 TYPE 2 DIABETES MELLITUS WITH HYPERGLYCEMIA, WITH LONG-TERM CURRENT USE OF INSULIN: ICD-10-CM

## 2024-04-18 DIAGNOSIS — I25.10 CORONARY ARTERY DISEASE INVOLVING NATIVE CORONARY ARTERY OF NATIVE HEART WITHOUT ANGINA PECTORIS: ICD-10-CM

## 2024-04-18 NOTE — TELEPHONE ENCOUNTER
----- Message from Shashank Morales sent at 4/18/2024  1:48 PM CDT -----  Regarding: Ozempic 2mg Patient Assistance Program  [facility] has received medication from [facility] patient assistance program for your patient Driss Jason Mary Akbar ([phone]).  At your earliest convenience please send to [facility] escript for.  Ozempic 2mg (qty 4 pens)  Thanks

## 2024-05-01 RX ORDER — GLIMEPIRIDE 1 MG/1
1 TABLET ORAL
Qty: 90 TABLET | Refills: 3 | Status: SHIPPED | OUTPATIENT
Start: 2024-05-01

## 2024-05-01 RX ORDER — ATORVASTATIN CALCIUM 80 MG/1
80 TABLET, FILM COATED ORAL NIGHTLY
Qty: 90 TABLET | Refills: 3 | Status: SHIPPED | OUTPATIENT
Start: 2024-05-01

## 2024-05-02 RX ORDER — APIXABAN 5 MG/1
5 TABLET, FILM COATED ORAL 2 TIMES DAILY
Qty: 180 TABLET | Refills: 3 | Status: SHIPPED | OUTPATIENT
Start: 2024-05-02

## 2024-05-15 ENCOUNTER — PATIENT MESSAGE (OUTPATIENT)
Dept: FAMILY MEDICINE | Facility: CLINIC | Age: 74
End: 2024-05-15
Payer: MEDICARE

## 2024-05-15 DIAGNOSIS — G47.00 INSOMNIA, UNSPECIFIED TYPE: ICD-10-CM

## 2024-05-15 NOTE — TELEPHONE ENCOUNTER
No care due was identified.  [facility] Embedded Care Due Messages. Reference number: [id number].   5/15/2024 8:22:14 AM CDT

## 2024-05-17 DIAGNOSIS — G47.00 INSOMNIA, UNSPECIFIED TYPE: ICD-10-CM

## 2024-05-17 RX ORDER — TRIAZOLAM 0.25 MG/1
TABLET ORAL
Qty: 90 TABLET | Refills: 5 | Status: SHIPPED | OUTPATIENT
Start: 2024-05-17

## 2024-05-17 NOTE — TELEPHONE ENCOUNTER
No care due was identified.  [facility] Embedded Care Due Messages. Reference number: [id number].   5/17/2024 3:55:45 PM CDT

## 2024-05-23 RX ORDER — TRIAZOLAM 0.25 MG/1
TABLET ORAL
Qty: 90 TABLET | Refills: 5 | Status: SHIPPED | OUTPATIENT
Start: 2024-05-23 | End: 2024-05-28 | Stop reason: SDUPTHER

## 2024-05-28 ENCOUNTER — TELEPHONE (OUTPATIENT)
Dept: FAMILY MEDICINE | Facility: CLINIC | Age: 74
End: 2024-05-28
Payer: MEDICARE

## 2024-05-28 DIAGNOSIS — G47.00 INSOMNIA, UNSPECIFIED TYPE: ICD-10-CM

## 2024-05-28 NOTE — TELEPHONE ENCOUNTER
No care due was identified.  [facility] Embedded Care Due Messages. Reference number: [id number].   5/28/2024 2:55:37 PM CDT

## 2024-05-28 NOTE — TELEPHONE ENCOUNTER
----- Message from Karena Carter MA sent at 5/28/2024  1:42 PM CDT -----  Type: Patient Call Back    Who called: Spouse - Ada    What is the request in detail: pt.'s medication was sent to the wrong pharmacy it needs to go to:     triazolam (HALCION) 0.25 MG Tab    [id number] - [place], [place] - [address] AT [facility]   Phone: [phone]  Fax: [phone]          Can the clinic reply by [facility]?No    Would the patient rather a call back or a response via [facility]? Yes, call     Best call back number: [phone]

## 2024-05-30 RX ORDER — TRIAZOLAM 0.25 MG/1
TABLET ORAL
Qty: 30 TABLET | Refills: 1 | Status: SHIPPED | OUTPATIENT
Start: 2024-05-30

## 2024-06-04 NOTE — TELEPHONE ENCOUNTER
Let [facility] know patient will have to just pay for the medication as I am sure he has done in the past

## 2024-06-04 NOTE — TELEPHONE ENCOUNTER
Per Alyssa Triazolam 0.25 MG Tablets are not covered by the patient's plan. The preferred alternative is Trazodone, Belsomra, Zolpidem Tartrate. Pharmacy confirmed.

## 2024-06-10 ENCOUNTER — OFFICE VISIT (OUTPATIENT)
Dept: CARDIOLOGY | Facility: CLINIC | Age: 74
End: 2024-06-10
Payer: MEDICARE

## 2024-06-10 VITALS
WEIGHT: 177.56 LBS | OXYGEN SATURATION: 97 % | DIASTOLIC BLOOD PRESSURE: 68 MMHG | HEART RATE: 73 BPM | BODY MASS INDEX: 27.87 KG/M2 | RESPIRATION RATE: 18 BRPM | HEIGHT: 67 IN | SYSTOLIC BLOOD PRESSURE: 128 MMHG

## 2024-06-10 DIAGNOSIS — I73.9 PERIPHERAL VASCULAR DISEASE, UNSPECIFIED: ICD-10-CM

## 2024-06-10 DIAGNOSIS — N18.31 STAGE 3A CHRONIC KIDNEY DISEASE: ICD-10-CM

## 2024-06-10 DIAGNOSIS — E78.5 DYSLIPIDEMIA ASSOCIATED WITH TYPE 2 DIABETES MELLITUS: ICD-10-CM

## 2024-06-10 DIAGNOSIS — E11.69 DYSLIPIDEMIA ASSOCIATED WITH TYPE 2 DIABETES MELLITUS: ICD-10-CM

## 2024-06-10 DIAGNOSIS — I10 HYPERTENSION, UNSPECIFIED TYPE: ICD-10-CM

## 2024-06-10 DIAGNOSIS — E78.2 MIXED HYPERLIPIDEMIA: ICD-10-CM

## 2024-06-10 DIAGNOSIS — I50.32 CHRONIC HEART FAILURE WITH PRESERVED EJECTION FRACTION: ICD-10-CM

## 2024-06-10 DIAGNOSIS — I10 ESSENTIAL HYPERTENSION: ICD-10-CM

## 2024-06-10 DIAGNOSIS — R94.31 ABNORMAL EKG: ICD-10-CM

## 2024-06-10 DIAGNOSIS — Z98.61 HISTORY OF PERCUTANEOUS CORONARY INTERVENTION: ICD-10-CM

## 2024-06-10 DIAGNOSIS — I70.0 AORTIC ATHEROSCLEROSIS: ICD-10-CM

## 2024-06-10 DIAGNOSIS — Z95.1 HX OF CABG: ICD-10-CM

## 2024-06-10 DIAGNOSIS — E11.59 HYPERTENSION ASSOCIATED WITH DIABETES: ICD-10-CM

## 2024-06-10 DIAGNOSIS — I48.20 CHRONIC ATRIAL FIBRILLATION: Primary | ICD-10-CM

## 2024-06-10 DIAGNOSIS — I65.23 ATHEROSCLEROSIS OF BOTH CAROTID ARTERIES: ICD-10-CM

## 2024-06-10 DIAGNOSIS — I25.810 CORONARY ARTERY DISEASE INVOLVING CORONARY BYPASS GRAFT OF NATIVE HEART WITHOUT ANGINA PECTORIS: ICD-10-CM

## 2024-06-10 DIAGNOSIS — Z79.01 LONG TERM (CURRENT) USE OF ANTICOAGULANTS: ICD-10-CM

## 2024-06-10 DIAGNOSIS — I15.2 HYPERTENSION ASSOCIATED WITH DIABETES: ICD-10-CM

## 2024-06-10 PROCEDURE — 99999 PR PBB SHADOW E&M-EST. PATIENT-LVL V: CPT | Mod: PBBFAC,HCNC,, | Performed by: INTERNAL MEDICINE

## 2024-06-10 PROCEDURE — 93000 ELECTROCARDIOGRAM COMPLETE: CPT | Mod: HCNC,S$GLB,, | Performed by: INTERNAL MEDICINE

## 2024-06-10 PROCEDURE — 99214 OFFICE O/P EST MOD 30 MIN: CPT | Mod: HCNC,S$GLB,, | Performed by: INTERNAL MEDICINE

## 2024-06-10 PROCEDURE — 1101F PT FALLS ASSESS-DOCD LE1/YR: CPT | Mod: HCNC,CPTII,S$GLB, | Performed by: INTERNAL MEDICINE

## 2024-06-10 PROCEDURE — 3044F HG A1C LEVEL LT 7.0%: CPT | Mod: HCNC,CPTII,S$GLB, | Performed by: INTERNAL MEDICINE

## 2024-06-10 PROCEDURE — 3074F SYST BP LT 130 MM HG: CPT | Mod: HCNC,CPTII,S$GLB, | Performed by: INTERNAL MEDICINE

## 2024-06-10 PROCEDURE — 3078F DIAST BP <80 MM HG: CPT | Mod: HCNC,CPTII,S$GLB, | Performed by: INTERNAL MEDICINE

## 2024-06-10 PROCEDURE — 1159F MED LIST DOCD IN RCRD: CPT | Mod: HCNC,CPTII,S$GLB, | Performed by: INTERNAL MEDICINE

## 2024-06-10 PROCEDURE — 3288F FALL RISK ASSESSMENT DOCD: CPT | Mod: HCNC,CPTII,S$GLB, | Performed by: INTERNAL MEDICINE

## 2024-06-10 PROCEDURE — G2211 COMPLEX E/M VISIT ADD ON: HCPCS | Mod: HCNC,S$GLB,, | Performed by: INTERNAL MEDICINE

## 2024-06-10 PROCEDURE — 1160F RVW MEDS BY RX/DR IN RCRD: CPT | Mod: HCNC,CPTII,S$GLB, | Performed by: INTERNAL MEDICINE

## 2024-06-10 PROCEDURE — 1126F AMNT PAIN NOTED NONE PRSNT: CPT | Mod: HCNC,CPTII,S$GLB, | Performed by: INTERNAL MEDICINE

## 2024-06-10 PROCEDURE — 3072F LOW RISK FOR RETINOPATHY: CPT | Mod: HCNC,CPTII,S$GLB, | Performed by: INTERNAL MEDICINE

## 2024-06-10 NOTE — PROGRESS NOTES
CARDIOVASCULAR PROGRESS NOTE    REASON FOR CONSULT:   Driss Hernandez Jr. is a 73 y.o. male who presents for follow up of CAD, HFpEF, Chr AF.    PCP: Ehrensing  Ortho: [facility]  HISTORY OF PRESENT ILLNESS:   The patient returns for follow up of his testing.  He tells me his fatigue is more or less resolved.  He denies any lightheadedness, dizziness, or syncope.  There has been no angina or dyspnea.  He tells me he has been able to cut his grass in his neighbor's lawn without any issues.  There has otherwise been no PND, orthopnea, melena, hematuria, or claudication symptoms.  Continues take Eliquis without any issues.    CARDIOVASCULAR HISTORY:   CAD 2016 CABG x3 (LIMA-LAD, SVG-D, SVG-PDA)   11/11/20 cath with diffuse native disease, patent SVG x2, LIMA-LAD atretic   12/10/20: PCI OM2 2.25x26 [facility] [place]    HFpEF    PAD    Chr AF on eliquis 5mg bid    Carotid atherosclerosis, bilat (US 3/2024)    PAST MEDICAL HISTORY:     Past Medical History:   Diagnosis Date    Chronic heart failure with preserved ejection fraction 2/9/2023    Chronic midline low back pain without sciatica 10/2/2017    Colon polyps     Coronary artery disease involving native coronary artery of native heart 3/5/2020    Coronary artery disease involving native coronary artery of native heart with angina pectoris 12/10/2020    Diabetes mellitus with neurological manifestations, uncontrolled 1/24/2017    Diabetic polyneuropathy associated with type 2 diabetes mellitus 1/24/2017    Diabetic polyneuropathy associated with type 2 diabetes mellitus     Essential hypertension 1/24/2017    Gastroesophageal reflux disease 1/24/2017    Hyperlipidemia 1/24/2017    Insomnia 1/24/2017    Long-term insulin use 1/24/2017    Longstanding persistent atrial fibrillation 12/30/2020    Lumbar spondylosis 11/13/2017    Nuclear sclerosis of both eyes 8/24/2017    Obesity     PAD (peripheral artery disease) 3/23/2022    Stage 3 chronic kidney disease 2/13/2019     Uncontrolled type 2 diabetes mellitus without complication, with long-term current use of insulin 1/24/2017       PAST SURGICAL HISTORY:     Past Surgical History:   Procedure Laterality Date    ARTHROSCOPIC REPAIR OF ROTATOR CUFF OF SHOULDER Right 7/5/2022    Procedure: REPAIR, ROTATOR CUFF, ARTHROSCOPIC;  Surgeon: Valerie Olivera MD;  Location: [facility] OR;  Service: Orthopedics;  Laterality: Right;  HETAL LEMUS [phone]/ TEXTED ALLAN ON 6/23/2022 @ 9:47AM. ALLAN RESPONDED ON 6/23/2022 @ 10:33AM-LO  JEAN PAUL BABIN [phone] MY BE HERE FOR CASE SPOKE TO HIM @ 9:59AM ON 7-1-2022  RN PREOP 6/28/2022    BACK SURGERY      2002    CATARACT EXTRACTION W/  INTRAOCULAR LENS IMPLANT Right 08/29/2017    Dr. Hong    CATARACT EXTRACTION W/  INTRAOCULAR LENS IMPLANT Left 02/24/2022    Dr. Hong    COLONOSCOPY N/A 2/21/2017    Procedure: COLONOSCOPY;  Surgeon: Robb Rosado MD;  Location: [facility] ENDO;  Service: Endoscopy;  Laterality: N/A;    COLONOSCOPY N/A 7/5/2023    Procedure: COLONOSCOPY;  Surgeon: Astno Varela MD;  Location: [facility] ENDO;  Service: Endoscopy;  Laterality: N/A;  Referral: JOVANNI SCANLON  ok to hold Plavix 5 days and Eliquis 2 days per Dr Purdy-OFELIA   Meds  Suprep   Inst portal   LW    CORONARY ANGIOGRAPHY INCLUDING BYPASS GRAFTS WITH CATHETERIZATION OF LEFT HEART N/A 11/11/2020    Procedure: ANGIOGRAM, CORONARY, INCLUDING BYPASS GRAFT, WITH LEFT HEART CATHETERIZATION;  Surgeon: Lane Cuellar MD;  Location: [facility] CATH LAB;  Service: Cardiology;  Laterality: N/A;  RN PRE OP 11-5-2020. --COVID NEGATIVE ON  11-. C A    CORONARY ARTERY BYPASS GRAFT      March 2016    EPIDURAL STEROID INJECTION Bilateral 11/14/2018    Procedure: Lumbar Medial Branch Blocks;  Surgeon: Eze Byrd Jr., MD;  Location: [facility] ENDO;  Service: Pain Management;  Laterality: Bilateral;  Bilateral Lumbar Medial Branch Blocks L2, L3, L4, L5    [id number]  [id number]    Arrive @ 1150; NO Sedation     EPIDURAL STEROID INJECTION Bilateral 2018    Procedure: Injection, Steroid, Epidural;  Surgeon: Eze Byrd Jr., MD;  Location: [facility] ENDO;  Service: Pain Management;  Laterality: Bilateral;  Bilateral Sacroiliac Joint Steroid Injections    [id number]    Arrive @ 0910    EPIDURAL STEROID INJECTION Bilateral 3/20/2019    Procedure: Injection, Steroid, Sacroiliiac Joint;  Surgeon: Eze Byrd Jr., MD;  Location: [facility] ENDO;  Service: Pain Management;  Laterality: Bilateral;  Bilateral Sacroiliac Joint Steroid Injections    27670    Arrive @ 1145; Trigger point injections also?    EPIDURAL STEROID INJECTION Right 2023    Procedure: Right L5 Transforaminal Epidural Steroid Injection;  Surgeon: Eze Byrd Jr., MD;  Location: [facility] ENDO;  Service: Pain Management;  Laterality: Right;  @0830 (given)  Eliquis last   Plavix last   Check BG    EPIDURAL STEROID INJECTION Right 10/11/2023    Procedure: Right L3 (infraneural) + S1 Transforaminal Epidural Steroid Injections;  Surgeon: Eze Byrd Jr., MD;  Location: [facility] ENDO;  Service: Pain Management;  Laterality: Right;  @0745 (requests morning)  Eliquis 10/7 & Plavix 10/5  Check BG    ESOPHAGOGASTRODUODENOSCOPY N/A 2023    Procedure: EGD (ESOPHAGOGASTRODUODENOSCOPY);  Surgeon: Anita Oswald MD;  Location: [facility] ENDO;  Service: Endoscopy;  Laterality: N/A;  Procedure Timin-4 weeks  Provider: Any GI provider  Location: No Preference  Additional Scheduling Information: Blood thinners  Prep Specifications:N/A   ref. by Dr. Light, instr. to portal, , [facility] meds-st  ok to hold Plavix 5 days and Eliquis 2 day    INTRAOCULAR PROSTHESES INSERTION Left 2022    Procedure: INSERTION, IOL PROSTHESIS;  Surgeon: Nickolas Hong MD;  Location: [facility] OR;  Service: Ophthalmology;  Laterality: Left;    PHACOEMULSIFICATION OF CATARACT Left 2022    Procedure: PHACOEMULSIFICATION, CATARACT;  Surgeon: Nickolas MARC  MD Gely;  Location: [facility] OR;  Service: Ophthalmology;  Laterality: Left;  RN Phone Pre Op 2-16-22.  Covid NEGATIVE  2-23-22.  Arrival 08:00 am.    TONSILLECTOMY         ALLERGIES AND MEDICATION:     Review of patient's allergies indicates:   Allergen Reactions    Penicillins Other (See Comments)     Unknown reaction, Had a reaction as a child    Shrimp Itching     Hand itching        Medication List            Accurate as of Carley 10, 2024 10:26 AM. If you have any questions, ask your nurse or doctor.                CONTINUE taking these medications      albuterol 90 mcg/actuation inhaler  Commonly known as: PROVENTIL/VENTOLIN HFA  Inhale 2 puffs into the lungs every 4 (four) hours as needed for Shortness of Breath. Rescue     ALCOHOL PREP PAD SPACER  Generic drug: alcohol swabs     ascorbic acid (vitamin C) 100 MG tablet  Commonly known as: VITAMIN C     atorvastatin 80 MG tablet  Commonly known as: LIPITOR  TAKE 1 TABLET EVERY EVENING     azelastine 137 mcg (0.1 %) nasal spray  Commonly known as: ASTELIN  1 spray (137 mcg total) by Nasal route 2 (two) times daily.     b complex vitamins tablet     blood sugar diagnostic Strp  Check blood glucose 2 times a day. True metrix. E11.65     blood-glucose meter kit  Test glucose 2-3x/day. True metrix     celecoxib 100 MG capsule  Commonly known as: CeleBREX  Take 1 capsule (100 mg total) by mouth once daily.     clopidogreL 75 mg tablet  Commonly known as: PLAVIX  Take 1 tablet (75 mg total) by mouth once daily.     coenzyme Q10 100 mg capsule     dapagliflozin propanediol 5 mg Tab tablet  Commonly known as: Farxiga  Take 1 tablet (5 mg total) by mouth once daily.     dexAMETHasone sodium phos (PF) 10 mg/mL Soln     ELIQUIS 5 mg Tab  Generic drug: apixaban  TAKE 1 TABLET TWICE DAILY     ergocalciferol 50,000 unit Cap  Commonly known as: VITAMIN D2  TAKE ONE CAPSULE BY MOUTH WEEKLY     fenofibrate 160 MG Tab  TAKE 1 TABLET EVERY MORNING     fluticasone propionate 50  "mcg/actuation nasal spray  Commonly known as: FLONASE  2 sprays (100 mcg total) by Each Nostril route 2 (two) times daily.     furosemide 20 MG tablet  Commonly known as: LASIX  TAKE 1 TABLET TWICE DAILY     gabapentin 100 MG capsule  Commonly known as: NEURONTIN  TAKE 2 CAPSULES EVERY MORNING AND TAKE 3 CAPSULES EVERY DAY WITH DINNER     glimepiride 1 MG tablet  Commonly known as: AMARYL  TAKE 1 TABLET BEFORE BREAKFAST     hydrALAZINE 50 MG tablet  Commonly known as: APRESOLINE  Take 1 tablet (50 mg total) by mouth 2 (two) times a day.     insulin degludec 100 unit/mL (3 mL) insulin pen  Commonly known as: TRESIBA FLEXTOUCH U-100  Inject 20 Units into the skin once daily.     isosorbide mononitrate 120 MG 24 hr tablet  Commonly known as: IMDUR  Take 1 tablet (120 mg total) by mouth once daily.     ketoconazole 2 % cream  Commonly known as: NIZORAL  Apply topically once daily.     * lancets Misc  Check blood glucose 2x/day. True metrix. E11.65     * TRUEPLUS LANCETS 33 gauge Misc  Generic drug: lancets     magnesium 250 mg Tab     metFORMIN 500 MG ER 24hr tablet  Commonly known as: GLUCOPHAGE-XR  Take 1 tablet with breakfast and 2 tablets with supper.     nitroGLYCERIN 0.4 MG SL tablet  Commonly known as: NITROSTAT  Place 1 tablet (0.4 mg total) under the tongue every 5 (five) minutes as needed for Chest pain.     NOVOFINE 32 32 gauge x 1/4" Ndle  Generic drug: pen needle, diabetic  Use daily     omega-3 acid ethyl esters 1 gram capsule  Commonly known as: LOVAZA  Take 2 capsules (2 g total) by mouth 2 (two) times daily.     OMNIPAQUE 300 300 mg iodine/mL injection  Generic drug: iohexoL     OZEMPIC 2 mg/dose (8 mg/3 mL) Pnij  Generic drug: semaglutide  Inject 2 mg into the skin every 7 days.     pantoprazole 40 MG tablet  Commonly known as: PROTONIX  TAKE 1 TABLET EVERY DAY     tiZANidine 4 MG tablet  Commonly known as: ZANAFLEX  Take 1 tablet (4 mg total) by mouth every 6 (six) hours as needed (pain).     * " triazolam 0.25 MG tablet  TAKE 1 TABLET BY MOUTH EVERY NIGHT AT BEDTIME AS NEEDED     * triazolam 0.25 MG tablet  TAKE 1 TABLET BY MOUTH EVERY NIGHT AT BEDTIME AS NEEDED           * This list has 4 medication(s) that are the same as other medications prescribed for you. Read the directions carefully, and ask your doctor or other care provider to review them with you.                  SOCIAL HISTORY:     Social History     Socioeconomic History    Marital status:    Tobacco Use    Smoking status: Never     Passive exposure: Never    Smokeless tobacco: Never   Substance and Sexual Activity    Alcohol use: Yes     Comment: rare occassion    Drug use: No    Sexual activity: Yes     Partners: Female     Social Determinants of Health     Financial Resource Strain: Low Risk  (1/22/2024)    Overall Financial Resource Strain (CARDIA)     Difficulty of Paying Living Expenses: Not hard at all   Food Insecurity: No Food Insecurity (1/22/2024)    Hunger Vital Sign     Worried About Running Out of Food in the Last Year: Never true     Ran Out of Food in the Last Year: Never true   Transportation Needs: No Transportation Needs (1/22/2024)    PRAPARE - Transportation     Lack of Transportation (Medical): No     Lack of Transportation (Non-Medical): No   Physical Activity: Insufficiently Active (1/22/2024)    Exercise Vital Sign     Days of Exercise per Week: 3 days     Minutes of Exercise per Session: 30 min   Stress: Stress Concern Present (1/22/2024)    [employer] [place] [employer] - Occupational Stress Questionnaire     Feeling of Stress : To some extent   Housing Stability: Low Risk  (1/22/2024)    Housing Stability Vital Sign     Unable to Pay for Housing in the Last Year: No     Number of Places Lived in the Last Year: 1     Unstable Housing in the Last Year: No       FAMILY HISTORY:     Family History   Problem Relation Name Age of Onset    Depression Mother      Heart disease Mother      Stroke Father    "   No Known Problems Sister Elaine     Diabetes Sister Dilcia     No Known Problems Sister Idalia     Blindness Brother Burt     No Known Problems Maternal Aunt      No Known Problems Maternal Uncle      No Known Problems Paternal Aunt      No Known Problems Paternal Uncle      No Known Problems Maternal Grandmother      No Known Problems Maternal Grandfather      No Known Problems Paternal Grandmother      No Known Problems Paternal Grandfather      Amblyopia Neg Hx      Cancer Neg Hx      Cataracts Neg Hx      Glaucoma Neg Hx      Hypertension Neg Hx      Macular degeneration Neg Hx      Retinal detachment Neg Hx      Strabismus Neg Hx      Thyroid disease Neg Hx         REVIEW OF SYSTEMS:   Review of Systems   Constitutional:  Negative for chills, diaphoresis, fever and malaise/fatigue.   HENT:  Negative for nosebleeds.    Eyes:  Negative for blurred vision, double vision and photophobia.   Respiratory:  Negative for cough, hemoptysis, shortness of breath and wheezing.    Cardiovascular:  Negative for chest pain, palpitations, orthopnea, claudication, leg swelling and PND.   Gastrointestinal:  Negative for abdominal pain, blood in stool, heartburn, melena, nausea and vomiting.   Genitourinary:  Negative for flank pain and hematuria.   Musculoskeletal:  Negative for falls, joint pain, myalgias and neck pain.   Skin:  Negative for rash.   Neurological:  Negative for dizziness, seizures, loss of consciousness, weakness and headaches.   Endo/Heme/Allergies:  Negative for polydipsia. Does not bruise/bleed easily.   Psychiatric/Behavioral:  Negative for depression and memory loss. The patient is not nervous/anxious.        PHYSICAL EXAM:     Vitals:    06/10/24 1021   BP: 128/68   Pulse: 73   Resp: 18    Body mass index is 27.81 kg/m².  Weight: 80.6 kg (177 lb 9.3 oz)   Height: 5' 7" (170.2 cm)     Physical Exam  Vitals reviewed.   Constitutional:       General: He is not in acute distress.     Appearance: Normal " appearance. He is well-developed. He is not ill-appearing, toxic-appearing or diaphoretic.   HENT:      Head: Normocephalic and atraumatic.   Eyes:      General: No scleral icterus.     Extraocular Movements: Extraocular movements intact.      Conjunctiva/sclera: Conjunctivae normal.      Pupils: Pupils are equal, round, and reactive to light.   Neck:      Thyroid: No thyromegaly.      Vascular: Normal carotid pulses. No carotid bruit or JVD.      Trachea: Trachea normal.   Cardiovascular:      Rate and Rhythm: Normal rate. Rhythm irregularly irregular.      Heart sounds: S1 normal and S2 normal. Heart sounds are distant. No murmur heard.     No friction rub. No gallop.   Pulmonary:      Effort: Pulmonary effort is normal. No respiratory distress.      Breath sounds: Normal breath sounds. No stridor. No wheezing, rhonchi or rales.   Chest:      Chest wall: No tenderness.   Abdominal:      General: There is no distension.      Palpations: Abdomen is soft.   Musculoskeletal:         General: No swelling or tenderness.      Cervical back: Normal range of motion and neck supple. No edema or rigidity.      Right lower leg: No edema.      Left lower leg: No edema.      Comments: R shoulder limited ROM   Feet:      Right foot:      Skin integrity: No ulcer.      Left foot:      Skin integrity: No ulcer.   Skin:     General: Skin is warm and dry.      Coloration: Skin is not jaundiced.   Neurological:      General: No focal deficit present.      Mental Status: He is alert and oriented to person, place, and time.      Cranial Nerves: No cranial nerve deficit.   Psychiatric:         Mood and Affect: Mood normal.         Speech: Speech normal.         Behavior: Behavior normal. Behavior is cooperative.         DATA:   EKG: (personally reviewed tracing(s))  6/10/24 AF 67, PRWP, NSSTTW changes    Laboratory:  CBC:  Recent Labs   Lab 06/09/22  0826 06/16/23  0953 06/24/23  2118   WBC 5.56 5.42 7.09   Hemoglobin 10.8 L 11.7 L 10.8  L   Hematocrit 36.7 L 41.4 37.0 L   Platelets 319 308 245       CHEMISTRIES:  Recent Labs   Lab 04/28/22  0923 05/11/22  0722 06/09/22  0826 08/11/22  0931 11/11/22  0729 05/22/23  0906 06/16/23  0953 06/24/23  2118   Glucose 146 H  --  126 H   < > 196 H  --  112 H 81   Sodium 141  --  143   < > 139  --  140 140   Potassium 4.9  --  4.9   < > 4.9  --  4.6 4.2   BUN 30 H  --  33 H   < > 25 H  --  27 H 25 H   Creatinine 1.5 H 1.5 H 1.5 H   < > 1.4 1.5 H 1.5 H 1.4   eGFR if  53 A 53.0 A 53.0 A  --   --   --   --   --    eGFR if non  46 A 45.9 A 45.9 A  --   --   --   --   --    Calcium 9.7  --  10.2   < > 9.5  --  10.3 9.4    < > = values in this interval not displayed.       CARDIAC BIOMARKERS:  Recent Labs   Lab 06/24/23 2118 06/24/23  2329   Troponin I 0.050 H 0.054 H       COAGS:  Recent Labs   Lab 06/24/23  2217   INR 1.1       LIPIDS/LFTS:  Recent Labs   Lab 05/11/22  0722 06/09/22  0826 09/13/22  0658 06/16/23  0953 06/24/23  2118   Cholesterol 123  --  133 141  --    Triglycerides 116  --  144 104  --    HDL 22 L  --  25 L 28 L  --    LDL Cholesterol 77.8  --  79.2 92.2  --    Non-HDL Cholesterol 101  --  108 113  --    AST  --    < > 18 18 22   ALT  --    < > 12 13 15    < > = values in this interval not displayed.       Cardiovascular Testing:  Echo 4/2/24    Left Ventricle: The left ventricle is normal in size. Mildly increased wall thickness. There is mild concentric hypertrophy. Septal motion is consistent with post-operative status. There is normal systolic function with a visually estimated ejection fraction of 55 - 60%. Unable to assess diastolic function due to atrial fibrillation.    Right Ventricle: Normal right ventricular cavity size. Systolic function is normal.    Pt in AFib throughout exam.    Holter 4/2/24    Atrial fibrillation    Heart rates varied between 31 and 103 BPM with an average of 57 BPM.    Longest R-R interval 3.6sec at 12:39am.    There were  occasional PVCs totalling 1185 and averaging 24.69 per hour. There were 10 couplets. There were 3 bigeminal cycles.    The diary was not returned.    Carotid US 3/19/24    There is 40-49% right Internal Carotid Stenosis.    There is 50-59% left Internal Carotid Stenosis.    >50% bilat ECA stenosis.    Compared with prior report dated 9/19/23: L ICA stenosis has progressed.    Ex MPI 3/13/23     The patient exercised for 8 minutes 16 seconds on a Marcial protocol, corresponding to a functional capacity of 9 METS, achieving a peak heart rate of 127 bpm, which is 86 % of the age predicted maximum heart rate. Their exercise capacity was above average.    Normal myocardial perfusion scan. There is no evidence of myocardial ischemia or infarction.    There is a mild to moderate intensity perfusion abnormality in the anteroseptal wall of the left ventricle consistent with the RV insertion site.    The gated perfusion images showed an ejection fraction of 66% post stress.    There is normal wall motion post stress.    The ECG portion of the study is negative for ischemia.    The patient reported no chest pain during the stress test.    During stress, rare PVCs are noted.    The exercise capacity was above average.    LE venous US 4/20/22  There is no evidence of a right lower extremity DVT.  There is no evidence of a left lower extremity DVT.  1.2x0.6cm non vascular structure noted in left proximal calf.    PCI OM1 12/10/20  1.  90% mid OM2 stenosis.  2.  Successful PCI with placement of a 2.25 x 26 mm drug-eluting stent reducing the 90% stenosis to 0%.  INGRIS 3 flow.  No dissection.  Good angiographic results.    Cath 11/11/20   Left Main   The vessel is angiographically normal.   Left Anterior Descending   Subtotal mid occlusion. Diffusely diseased distal vessel   Left Circumflex   Long mid 80% between OM1 and OM2   First Obtuse Marginal Branch   90% mid   Second Obtuse Marginal Branch   80% mid   Right Coronary Artery   80%  mid - moderate disffuse distal disease   LIMA Graft To Mid LAD   Mid to distal graft diffusely diseased 0 multile 80-90% lesions. Small diffusely diseased distal LAD   Saphenous Graft To RPDA   Patent with good runoff to PDA   Graft To 1st Diag   Patent to D1 with good runoff     Ex NUSRAT 4/18/18  Resting NUSRAT:   The right ankle brachial index was 1.04 which is normal.   The left ankle brachial index was 1.08 which is normal.   The right TBI is 0.55.   The left TBI is 0.98.   Exercise NUSRAT:   Post exercise right ankle pressure was 113 mmHg, resulting in a right post-exercise NUSRAT of 0.67.   Post exercise left ankle pressure was 150 mmHg, resulting in a left post-exercise NUSRAT of 0.89.       ASSESSMENT:   # Chr AF on eliquis 5mg bid, HR controlled (?too slow).  Holter 3/2023 OK, no evidence of chronotropic incompetence on Ex MPI 3/2023. Fatigue resolved.  No significant bradycardia on holter 4/2024 (longets RR interval during sleeping hours).  # CAD s/p CABG/PCI OM 12/2020, asymtomatic.  MPI 3/2023 neg.  # HTN, controlled  # HFpEF, euvolemic  # CKD3a, elev creat/K+ with losartan, normalized when stopped losartan.  # HLP on atorva 80mg  # PAD, abnl NUSRAT 4/18/18  # DM  # carotid atherosclerosis (CUS 9/2023)  # L>R LE edema, resolved.  LE venous US 4/2022 neg.  # aortic atherosclerosis (CT Chest 10/29/18)    PLAN:   Cont med rx  Cont Plavix 75mg qd  Cont eliquis 5mg bid  RTC 3 months with surveillance Carotid US 3 months (Sept 2024)    The above documents medical care services that are part of ongoing care related to this patient's serious/complex condition (Code ) (AF, HTN, CAD/HFpEF).        Eze Purdy MD, FACC

## 2024-06-11 LAB
OHS QRS DURATION: 92 MS
OHS QTC CALCULATION: 433 MS

## 2024-06-22 DIAGNOSIS — E55.9 HYPOVITAMINOSIS D: ICD-10-CM

## 2024-06-24 RX ORDER — ERGOCALCIFEROL 1.25 MG/1
CAPSULE ORAL
Qty: 12 CAPSULE | Refills: 0 | Status: SHIPPED | OUTPATIENT
Start: 2024-06-24 | End: 2024-06-27

## 2024-06-25 ENCOUNTER — PATIENT MESSAGE (OUTPATIENT)
Dept: ENDOCRINOLOGY | Facility: CLINIC | Age: 74
End: 2024-06-25
Payer: MEDICARE

## 2024-06-27 DIAGNOSIS — E55.9 HYPOVITAMINOSIS D: ICD-10-CM

## 2024-06-27 RX ORDER — ERGOCALCIFEROL 1.25 MG/1
CAPSULE ORAL
Qty: 12 CAPSULE | Refills: 0 | Status: SHIPPED | OUTPATIENT
Start: 2024-06-27

## 2024-07-16 RX ORDER — FUROSEMIDE 20 MG/1
TABLET ORAL
Qty: 180 TABLET | Refills: 3 | Status: SHIPPED | OUTPATIENT
Start: 2024-07-16

## 2024-07-24 ENCOUNTER — LAB VISIT (OUTPATIENT)
Dept: LAB | Facility: HOSPITAL | Age: 74
End: 2024-07-24
Payer: MEDICARE

## 2024-07-24 DIAGNOSIS — I25.10 CORONARY ARTERY DISEASE INVOLVING NATIVE CORONARY ARTERY OF NATIVE HEART WITHOUT ANGINA PECTORIS: ICD-10-CM

## 2024-07-24 DIAGNOSIS — E11.8 CONTROLLED TYPE 2 DIABETES MELLITUS WITH COMPLICATION, WITH LONG-TERM CURRENT USE OF INSULIN: ICD-10-CM

## 2024-07-24 DIAGNOSIS — Z79.4 CONTROLLED TYPE 2 DIABETES MELLITUS WITH COMPLICATION, WITH LONG-TERM CURRENT USE OF INSULIN: ICD-10-CM

## 2024-07-24 DIAGNOSIS — E55.9 HYPOVITAMINOSIS D: ICD-10-CM

## 2024-07-24 LAB
25(OH)D3+25(OH)D2 SERPL-MCNC: 31 NG/ML (ref 30–96)
CHOLEST SERPL-MCNC: 138 MG/DL (ref 120–199)
CHOLEST/HDLC SERPL: 4.8 {RATIO} (ref 2–5)
CREAT SERPL-MCNC: 1.6 MG/DL (ref 0.5–1.4)
EST. GFR  (NO RACE VARIABLE): 44.9 ML/MIN/1.73 M^2
ESTIMATED AVG GLUCOSE: 140 MG/DL (ref 68–131)
HBA1C MFR BLD: 6.5 % (ref 4–5.6)
HDLC SERPL-MCNC: 29 MG/DL (ref 40–75)
HDLC SERPL: 21 % (ref 20–50)
LDLC SERPL CALC-MCNC: 70 MG/DL (ref 63–159)
NONHDLC SERPL-MCNC: 109 MG/DL
TRIGL SERPL-MCNC: 195 MG/DL (ref 30–150)

## 2024-07-24 PROCEDURE — 80061 LIPID PANEL: CPT | Mod: HCNC | Performed by: NURSE PRACTITIONER

## 2024-07-24 PROCEDURE — 36415 COLL VENOUS BLD VENIPUNCTURE: CPT | Mod: HCNC,PO | Performed by: NURSE PRACTITIONER

## 2024-07-24 PROCEDURE — 82306 VITAMIN D 25 HYDROXY: CPT | Mod: HCNC | Performed by: NURSE PRACTITIONER

## 2024-07-24 PROCEDURE — 83036 HEMOGLOBIN GLYCOSYLATED A1C: CPT | Mod: HCNC | Performed by: NURSE PRACTITIONER

## 2024-07-24 PROCEDURE — 82565 ASSAY OF CREATININE: CPT | Mod: HCNC | Performed by: NURSE PRACTITIONER

## 2024-07-31 ENCOUNTER — OFFICE VISIT (OUTPATIENT)
Dept: ENDOCRINOLOGY | Facility: CLINIC | Age: 74
End: 2024-07-31
Payer: MEDICARE

## 2024-07-31 VITALS
HEART RATE: 52 BPM | DIASTOLIC BLOOD PRESSURE: 50 MMHG | BODY MASS INDEX: 27.57 KG/M2 | WEIGHT: 176 LBS | SYSTOLIC BLOOD PRESSURE: 110 MMHG | TEMPERATURE: 98 F

## 2024-07-31 DIAGNOSIS — N18.31 STAGE 3A CHRONIC KIDNEY DISEASE: ICD-10-CM

## 2024-07-31 DIAGNOSIS — Z79.4 CONTROLLED TYPE 2 DIABETES MELLITUS WITH COMPLICATION, WITH LONG-TERM CURRENT USE OF INSULIN: Primary | ICD-10-CM

## 2024-07-31 DIAGNOSIS — I25.10 CORONARY ARTERY DISEASE INVOLVING NATIVE CORONARY ARTERY OF NATIVE HEART WITHOUT ANGINA PECTORIS: ICD-10-CM

## 2024-07-31 DIAGNOSIS — E11.8 CONTROLLED TYPE 2 DIABETES MELLITUS WITH COMPLICATION, WITH LONG-TERM CURRENT USE OF INSULIN: Primary | ICD-10-CM

## 2024-07-31 PROCEDURE — 3066F NEPHROPATHY DOC TX: CPT | Mod: HCNC,CPTII,S$GLB, | Performed by: NURSE PRACTITIONER

## 2024-07-31 PROCEDURE — 3061F NEG MICROALBUMINURIA REV: CPT | Mod: HCNC,CPTII,S$GLB, | Performed by: NURSE PRACTITIONER

## 2024-07-31 PROCEDURE — 3078F DIAST BP <80 MM HG: CPT | Mod: HCNC,CPTII,S$GLB, | Performed by: NURSE PRACTITIONER

## 2024-07-31 PROCEDURE — 3008F BODY MASS INDEX DOCD: CPT | Mod: HCNC,CPTII,S$GLB, | Performed by: NURSE PRACTITIONER

## 2024-07-31 PROCEDURE — 3044F HG A1C LEVEL LT 7.0%: CPT | Mod: HCNC,CPTII,S$GLB, | Performed by: NURSE PRACTITIONER

## 2024-07-31 PROCEDURE — 1159F MED LIST DOCD IN RCRD: CPT | Mod: HCNC,CPTII,S$GLB, | Performed by: NURSE PRACTITIONER

## 2024-07-31 PROCEDURE — 99999 PR PBB SHADOW E&M-EST. PATIENT-LVL V: CPT | Mod: PBBFAC,HCNC,, | Performed by: NURSE PRACTITIONER

## 2024-07-31 PROCEDURE — 3074F SYST BP LT 130 MM HG: CPT | Mod: HCNC,CPTII,S$GLB, | Performed by: NURSE PRACTITIONER

## 2024-07-31 PROCEDURE — 99214 OFFICE O/P EST MOD 30 MIN: CPT | Mod: HCNC,S$GLB,, | Performed by: NURSE PRACTITIONER

## 2024-07-31 PROCEDURE — 1160F RVW MEDS BY RX/DR IN RCRD: CPT | Mod: HCNC,CPTII,S$GLB, | Performed by: NURSE PRACTITIONER

## 2024-07-31 PROCEDURE — 3072F LOW RISK FOR RETINOPATHY: CPT | Mod: HCNC,CPTII,S$GLB, | Performed by: NURSE PRACTITIONER

## 2024-07-31 RX ORDER — METFORMIN HYDROCHLORIDE 500 MG/1
TABLET, EXTENDED RELEASE ORAL
Qty: 270 TABLET | Refills: 2 | Status: SHIPPED | OUTPATIENT
Start: 2024-07-31

## 2024-07-31 NOTE — PROGRESS NOTES
CC: This 73 y.o. White male  is here for evaluation of  T2DM along with comorbidities indicated in the Visit Diagnosis section of this encounter.    HPI: Driss Hernandez Jr. was diagnosed with T2DM in ~ 2005.   Pt was under care of Dr. Agrawal and TARI Werner NP, previously.   Medical history: CAD s/p CABG, atrial fibrillation, CHF, CKD        Prior visit 1/30/24  A1c is down from 6.4 to 6.2%.   90 day CGM average 143.   He is taking glimepiride 1/2 tablet daily but unsure what dosage the tablet is.   He has gained 5 lb since lov. Ate more sweets during holidays and now with grey cake.   Plan Continue current meds.   Diabetes is well-controlled. Pt will send msg re: what dose his glimepride rx is so that we can update medical record.   Return to clinic in 6 months with labs prior.   Obtain yearly eye exam with Dr. Iva Orantes around April.       Interval hx  A1c remains about the same 6.2% in Jan to now 6.5%. Pt reports eating a little bit more.         LAST DIABETES EDUCATION: never --     HOSPITALIZED FOR DIABETES  -  No.    DIABETES MEDICATION HX:   ozempic started 10/2020  Novolin R switched to glimepiride in Jan 2021     PRESCRIBED DIABETES MEDICATIONS: obtains Farxiga, Ozempic and Tresiba from [facility]  Ozempic 2 mg once weekly  on Saturdays   Tresiba  20 units hs   Farxiga 5 mg daily   metformin  mg AM and 1000 mg PM   glimepiride 1 mg once daily around 3 pm before snack     Misses medication doses - No      DM COMPLICATIONS: nephropathy, peripheral neuropathy, and cardiovascular disease    SELF MONITORING BLOOD GLUCOSE: uses Dexcom G7 reader.   Forgot reader today.   Recalls FBGs 130s; postprandial BGs are 160-170s, as high as 250 depending on diet.     HYPOGLYCEMIC EPISODES:  none recent; symptoms start < 70, infrequent.      CURRENT DIET: drinks water, 2% milk ~ 2 cup/day. Eats mostly just lunch and dinner. Lunch can be as late as 2 pm. Occasionally eats a protein bar in the AM.     drinks  2 cups coffee with milk with AS in AM     SOCIAL: his daughter is a palliative care NP      BP (!) 110/50   Pulse (!) 52   Temp 98.2 °F (36.8 °C)   Wt 79.8 kg (176 lb)   BMI 27.57 kg/m²       ROS:   CONSTITUTIONAL: Appetite good, denies fatigue  GI: Denies n/v;  + urgent BMs after meal, + intermittent constipation stable;       PHYSICAL EXAM:  GENERAL: Well developed, well nourished. No acute distress.   PSYCH: AAOx3, appropriate mood and affect, conversant, well-groomed. Judgement and insight good.   NEURO: Cranial nerves grossly intact. Speech clear, no tremor.   CHEST: Respirations even and unlabored.     Foot exam:     Protective Sensation (w/ 10 gram monofilament):  Right: Intact  Left: Intact    Visual Inspection:  Skin Breakdown -  Neither    Pedal Pulses:   Right: Present  Left: Present    Posterior Tibialis Pulses:   Right:Diminished  Left: Diminished      Hemoglobin A1C   Date Value Ref Range Status   07/24/2024 6.5 (H) 4.0 - 5.6 % Final     Comment:     ADA Screening Guidelines:  5.7-6.4%  Consistent with prediabetes  >or=6.5%  Consistent with diabetes    High levels of fetal hemoglobin interfere with the HbA1C  assay. Heterozygous hemoglobin variants (HbS, HgC, etc)do  not significantly interfere with this assay.   However, presence of multiple variants may affect accuracy.     01/23/2024 6.2 (H) 4.0 - 5.6 % Final     Comment:     ADA Screening Guidelines:  5.7-6.4%  Consistent with prediabetes  >or=6.5%  Consistent with diabetes    High levels of fetal hemoglobin interfere with the HbA1C  assay. Heterozygous hemoglobin variants (HbS, HgC, etc)do  not significantly interfere with this assay.   However, presence of multiple variants may affect accuracy.     09/19/2023 6.4 (H) 4.0 - 5.6 % Final     Comment:     ADA Screening Guidelines:  5.7-6.4%  Consistent with prediabetes  >or=6.5%  Consistent with diabetes    High levels of fetal hemoglobin interfere with the HbA1C  assay. Heterozygous hemoglobin  variants (HbS, HgC, etc)do  not significantly interfere with this assay.   However, presence of multiple variants may affect accuracy.     08/29/2019 7.7 % Final     Comment:     Ania Werner             Component Value Date/Time     06/24/2023 2118    K 4.2 06/24/2023 2118     06/24/2023 2118    CO2 23 06/24/2023 2118    BUN 25 (H) 06/24/2023 2118    CREATININE 1.6 (H) 07/24/2024 0735    GLU 81 06/24/2023 2118    CALCIUM 9.4 06/24/2023 2118    ALKPHOS 29 (L) 06/24/2023 2118    AST 22 06/24/2023 2118    ALT 15 06/24/2023 2118    BILITOT 0.4 06/24/2023 2118    EGFRNORACEVR 44.9 (A) 07/24/2024 0735    ESTGFRAFRICA 53.0 (A) 06/09/2022 0826          Lab Results   Component Value Date    CHOL 138 07/24/2024    CHOL 141 06/16/2023    CHOL 133 09/13/2022     Lab Results   Component Value Date    HDL 29 (L) 07/24/2024    HDL 28 (L) 06/16/2023    HDL 25 (L) 09/13/2022     Lab Results   Component Value Date    LDLCALC 70.0 07/24/2024    LDLCALC 92.2 06/16/2023    LDLCALC 79.2 09/13/2022     Lab Results   Component Value Date    TRIG 195 (H) 07/24/2024    TRIG 104 06/16/2023    TRIG 144 09/13/2022       Lab Results   Component Value Date    CHOLHDL 21.0 07/24/2024    CHOLHDL 19.9 (L) 06/16/2023    CHOLHDL 18.8 (L) 09/13/2022         Lab Results   Component Value Date    MICALBCREAT 8.5 07/24/2024             ASSESSMENT and PLAN:    A1C GOAL: < 7.5 %        1. Controlled type 2 diabetes mellitus with complication, with long-term current use of insulin  Schedule dilated eye exam.   Glucoses remain controlled. Continue current medications. Pt will need to re-apply for [facility] yearly to continue his medications.   Rtc prn   F/u with Dr. Ehrensing for chronic DM management.       2. Stage 3a chronic kidney disease  Stable. Continue ozempic and Farxiga.       3. Coronary artery disease involving native coronary artery of native heart without angina pectoris  Continue Ozempic and Farxiga.  Maintain glucose  control.             No orders of the defined types were placed in this encounter.         Follow up if symptoms worsen or fail to improve.

## 2024-07-31 NOTE — Clinical Note
José Willoughby, pt is transferred back to [facility] for chronic diabetes management. DM is controlled. He will need to re-apply for DM meds every year with [facility]. Thanks, Janie

## 2024-08-05 ENCOUNTER — TELEPHONE (OUTPATIENT)
Dept: OPTOMETRY | Facility: CLINIC | Age: 74
End: 2024-08-05
Payer: MEDICARE

## 2024-08-29 NOTE — Clinical Note
Physical Therapy Evaluation    Visit Type: Initial Evaluation  Visit: 1  Referring Provider: MARY Olsen*  Medical Diagnosis (from order): N94.10 - Dyspareunia, female  N94.6 - Dysmenorrhea  R10.2 - Pelvic pain in female   Treatment Diagnosis: pelvic health - impaired range of motion, impaired tissue mobility, increased pain/symptoms, impaired sexual health, impaired activity tolerance and impaired motor function/performance/coordination.  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  midodrine (PROAMATINE) 5 MG tablet, Take 1 tablet by mouth 3 times daily (before meals).  nitrofurantoin, macrocrystal-monohydrate, (MACROBID) 100 MG capsule, Take 1 capsule by mouth daily as needed (after intercourse).  cephalexin (Keflex) 500 MG capsule, Take 1 capsule by mouth 3 times daily.  predniSONE (DELTASONE) 2.5 MG tablet, Take 1 tablet by mouth daily.  Probiotic Product (Align) 4 MG Cap, Take 4 mg by mouth 2 times daily.  Multiple Vitamins-Minerals (Hair Skin and Nails Formula) Tab,   DISPENSE, Hair growth  VITAMIN D PO, Take 2 capsules by mouth daily.  cycloSPORINE (RESTASIS) 0.05 % ophthalmic emulsion, Place 1 drop into both eyes 2 times daily.  riTUXimab (RITUXAN) 500 MG/50ML injection, Inject 1,000 mg into the vein every 6 months. Due next month for injection.  acetaminophen (TYLENOL) 500 MG  The catheter is removed ostium   right coronary artery.  tablet, Take 1,000 mg by mouth daily as needed for Pain.  aspirin 81 MG tablet, Take 81 mg by mouth daily.    No current facility-administered medications for this visit.          SUBJECTIVE                                                                                                               Patient reports onset of vaginal pain during intercourse and only in certain positions - like when she is on top.  Her partner hits a spot on the left side with deeper penetration this area feels sharp in nature.  She has some soreness after intercourse also.      Patient reports this pain started about three years ago and started to increase after her tubal ligation.  She also reports increased vaginal dryness over this time period also.  She reports trying a vaginal lubricant but hasn't found one yet that she likes.  She has a history of recurrent UTIs.      Three children - 2 vaginal deliveries, 1     Pain / Symptoms  - Location: Left vaginal pain, vaginal dryness  - Quality / Description:        • Bladder: frequency (frequency depends on day - sometimes every 30 minutes)       Pelvic bowel symptoms: no bowel issues.     - If she drinks more water, she will void more often.    - Alleviating Factors: avoiding movement in involved area, change in position    Function:   Limitations / Exacerbation Factors:   - , during sex and insertive intercourse  Prior Level of Function: change in symptoms intensity/frequency,    Patient Goals: decrease symptoms and decreased pain.    Prior treatment  - no therapies  - Discharged from hospital, home health, or skilled nursing facility in last 30 days: no  Home Environment   - Patient lives with: significant other and children  - Type of home: single level home  - Assistance available: as needed  - Denies 2 or more falls or an unexplained fall with injury in the last year.  - Feel safe at home / work / school: yes      OBJECTIVE                                                                                                                     Scar:   - Location:  scar , pliable and nonadherent    Observation   Special Tests:  In standing iliac crests level  minimal myofascial restrictions in low back  Lumbar ROM WFLs except forward flexion, extension and right rotation limited by lumbar restrictions.     Positive carnette's test for left lower abdominal trigger point.               Muscle Length Tests  - Piriformis Test:   Left: positive  Right: positive  - Parker Test:   Left: hip flexors positive   - 90/90 Hamstring at knee:  - Left:  25° - Right: 20°             Outcome/Assessments  Patient Specific Functional Scale:   Activity: Vaginal intercourse without pain , Score: 8  Activity: Pelvic exam without pain, Score: 8  Average Score: 8  Each activity is scored: 0=unable to perform activity to 10=able to perform activity at the same level as before injury or problem      Treatment     Therapeutic Exercise  Seated hamstring stretch  Seated piriformis stretch    Activities of Daily Living/Self Care  Patient educated about normal voiding frequency of every 2-4 hours and encouraged to urge delay and allow bladder to fill to a normal amount before.      Patient was educated about personal lubrication to decrease pain and discomfort during intercourse.  She was educated about water based vs silicone based lubricants and educated about lubricants that can improve vaginal tissue hydration.        Skilled input: as detailed above    Writer verbally educated and received verbal consent for hand placement, positioning of patient, and techniques to be performed today from patient   Home Exercise Program  Seated hamstring and piriformis stretch      ASSESSMENT                                                                                                          39 year old patient has reported functional limitations listed above impacted by signs and symptoms consistent with treatment  diagnosis below.  Treatment Diagnosis:   - Involved: pelvic health.  - Symptoms/impairments: impaired range of motion, impaired tissue mobility, increased pain/symptoms, impaired sexual health, impaired activity tolerance and impaired motor function/performance/coordination.    Patient with deep dyspareunia and vaginal dryness creating discomfort during intercourse.  She was educated about vaginal moisturizers and silicone based lubricants. She has some left lower abdominal trigger points and tightness around hips/pelvis.  She will start with some basic leg stretches and we will consider a pelvic floor exam if pain/discomfort persist.      Prognosis: Patient will benefit from skilled therapy.  Rehabilitative potential is: good.  Predicted patient presentation: Moderate (evolving) - Patient comorbidities and complexities, as defined above, may have varying impact on steady progress for prescribed plan of care.  Education:   - Present and ready to learn: patient  - Results of above outlined education: Verbalizes understanding    PLAN                                                                                                                         The following skilled interventions to be implemented to achieve goals listed below:  Neuromuscular Re-Education (04406)  Therapeutic Activity (66844)  Therapeutic Exercise (16460)  Manual Therapy (76020)  Gait Training (34199)  Activities of Daily Living/Self Care (83807)    Frequency / Duration  2 times per month tapering as patient progresses for 6 months for an estimated total of 12 visits    Patient involved in and agreed to plan of care and goals.    Suggestions for next session as indicated: Progress per plan of care, consider pelvic floor exam if dyspareunia persists.   Add abdominal stretching    Goals  Long Term Goals: to be met by end of plan of care  1. Patient to be independent with home exercise program to address pelvic pain symptoms.   2. Patient to  tolerated pelvic exam without pain.   3. Patient will demonstrate appropriate  strategies to reduce vaginal pain during intercourse without pain/symptoms.  4. Patient Specific Functional Scale (PSFS) will improve an average score 10/10 (minimal detectable change (90%CI) for average score is 2 points, for single activity score is 3 points)      Therapy procedure time and total treatment time can be found documented on the Time Entry flowsheet

## 2024-09-05 ENCOUNTER — OFFICE VISIT (OUTPATIENT)
Dept: FAMILY MEDICINE | Facility: CLINIC | Age: 74
End: 2024-09-05
Payer: MEDICARE

## 2024-09-05 ENCOUNTER — LAB VISIT (OUTPATIENT)
Dept: LAB | Facility: HOSPITAL | Age: 74
End: 2024-09-05
Attending: INTERNAL MEDICINE
Payer: MEDICARE

## 2024-09-05 VITALS
HEIGHT: 67 IN | BODY MASS INDEX: 27.09 KG/M2 | WEIGHT: 172.63 LBS | TEMPERATURE: 98 F | HEART RATE: 55 BPM | OXYGEN SATURATION: 97 % | DIASTOLIC BLOOD PRESSURE: 66 MMHG | SYSTOLIC BLOOD PRESSURE: 126 MMHG

## 2024-09-05 DIAGNOSIS — N40.0 BENIGN PROSTATIC HYPERPLASIA, UNSPECIFIED WHETHER LOWER URINARY TRACT SYMPTOMS PRESENT: ICD-10-CM

## 2024-09-05 DIAGNOSIS — F13.20 SEDATIVE DEPENDENCE: ICD-10-CM

## 2024-09-05 DIAGNOSIS — E55.9 VITAMIN D DEFICIENCY DISEASE: ICD-10-CM

## 2024-09-05 DIAGNOSIS — N18.31 DIABETES MELLITUS DUE TO UNDERLYING CONDITION WITH STAGE 3A CHRONIC KIDNEY DISEASE, WITH LONG-TERM CURRENT USE OF INSULIN: ICD-10-CM

## 2024-09-05 DIAGNOSIS — I50.32 CHRONIC HEART FAILURE WITH PRESERVED EJECTION FRACTION: ICD-10-CM

## 2024-09-05 DIAGNOSIS — E11.9 DM TYPE 2 WITHOUT RETINOPATHY: ICD-10-CM

## 2024-09-05 DIAGNOSIS — E11.42 DIABETIC POLYNEUROPATHY ASSOCIATED WITH TYPE 2 DIABETES MELLITUS: ICD-10-CM

## 2024-09-05 DIAGNOSIS — M47.816 LUMBAR SPONDYLOSIS: ICD-10-CM

## 2024-09-05 DIAGNOSIS — E11.65 TYPE 2 DIABETES MELLITUS WITH HYPERGLYCEMIA, WITH LONG-TERM CURRENT USE OF INSULIN: ICD-10-CM

## 2024-09-05 DIAGNOSIS — I25.709 ATHEROSCLEROSIS OF CORONARY ARTERY BYPASS GRAFT WITH ANGINA PECTORIS, UNSPECIFIED WHETHER NATIVE OR TRANSPLANTED HEART: ICD-10-CM

## 2024-09-05 DIAGNOSIS — Z79.4 TYPE 2 DIABETES MELLITUS WITH HYPERGLYCEMIA, WITH LONG-TERM CURRENT USE OF INSULIN: ICD-10-CM

## 2024-09-05 DIAGNOSIS — N18.31 STAGE 3A CHRONIC KIDNEY DISEASE: ICD-10-CM

## 2024-09-05 DIAGNOSIS — Z00.00 ROUTINE MEDICAL EXAM: Primary | ICD-10-CM

## 2024-09-05 DIAGNOSIS — Z12.5 SCREENING FOR PROSTATE CANCER: ICD-10-CM

## 2024-09-05 DIAGNOSIS — M46.1 BILATERAL SACROILIITIS: ICD-10-CM

## 2024-09-05 DIAGNOSIS — I48.11 LONGSTANDING PERSISTENT ATRIAL FIBRILLATION: ICD-10-CM

## 2024-09-05 DIAGNOSIS — Z86.010 HISTORY OF COLONIC POLYPS: ICD-10-CM

## 2024-09-05 DIAGNOSIS — D50.9 IRON DEFICIENCY ANEMIA, UNSPECIFIED IRON DEFICIENCY ANEMIA TYPE: ICD-10-CM

## 2024-09-05 DIAGNOSIS — Z79.4 LONG-TERM INSULIN USE: ICD-10-CM

## 2024-09-05 DIAGNOSIS — I10 ESSENTIAL HYPERTENSION: ICD-10-CM

## 2024-09-05 DIAGNOSIS — Z79.4 DIABETES MELLITUS DUE TO UNDERLYING CONDITION WITH STAGE 3A CHRONIC KIDNEY DISEASE, WITH LONG-TERM CURRENT USE OF INSULIN: ICD-10-CM

## 2024-09-05 DIAGNOSIS — E08.22 DIABETES MELLITUS DUE TO UNDERLYING CONDITION WITH STAGE 3A CHRONIC KIDNEY DISEASE, WITH LONG-TERM CURRENT USE OF INSULIN: ICD-10-CM

## 2024-09-05 LAB
ALBUMIN SERPL BCP-MCNC: 4.1 G/DL (ref 3.5–5.2)
ALP SERPL-CCNC: 26 U/L (ref 55–135)
ALT SERPL W/O P-5'-P-CCNC: 17 U/L (ref 10–44)
ANION GAP SERPL CALC-SCNC: 13 MMOL/L (ref 8–16)
AST SERPL-CCNC: 18 U/L (ref 10–40)
BASOPHILS # BLD AUTO: 0.02 K/UL (ref 0–0.2)
BASOPHILS NFR BLD: 0.4 % (ref 0–1.9)
BILIRUB SERPL-MCNC: 0.6 MG/DL (ref 0.1–1)
BUN SERPL-MCNC: 30 MG/DL (ref 8–23)
CALCIUM SERPL-MCNC: 10.3 MG/DL (ref 8.7–10.5)
CHLORIDE SERPL-SCNC: 102 MMOL/L (ref 95–110)
CO2 SERPL-SCNC: 26 MMOL/L (ref 23–29)
COMPLEXED PSA SERPL-MCNC: 0.92 NG/ML (ref 0–4)
CREAT SERPL-MCNC: 1.6 MG/DL (ref 0.5–1.4)
DIFFERENTIAL METHOD BLD: ABNORMAL
EOSINOPHIL # BLD AUTO: 0.1 K/UL (ref 0–0.5)
EOSINOPHIL NFR BLD: 1.9 % (ref 0–8)
ERYTHROCYTE [DISTWIDTH] IN BLOOD BY AUTOMATED COUNT: 17.3 % (ref 11.5–14.5)
EST. GFR  (NO RACE VARIABLE): 44.9 ML/MIN/1.73 M^2
FERRITIN SERPL-MCNC: 50 NG/ML (ref 20–300)
GLUCOSE SERPL-MCNC: 91 MG/DL (ref 70–110)
HCT VFR BLD AUTO: 50.6 % (ref 40–54)
HGB BLD-MCNC: 15.2 G/DL (ref 14–18)
IMM GRANULOCYTES # BLD AUTO: 0.01 K/UL (ref 0–0.04)
IMM GRANULOCYTES NFR BLD AUTO: 0.2 % (ref 0–0.5)
IRON SERPL-MCNC: 62 UG/DL (ref 45–160)
LYMPHOCYTES # BLD AUTO: 1.9 K/UL (ref 1–4.8)
LYMPHOCYTES NFR BLD: 32.6 % (ref 18–48)
MCH RBC QN AUTO: 25.2 PG (ref 27–31)
MCHC RBC AUTO-ENTMCNC: 30 G/DL (ref 32–36)
MCV RBC AUTO: 84 FL (ref 82–98)
MONOCYTES # BLD AUTO: 0.6 K/UL (ref 0.3–1)
MONOCYTES NFR BLD: 10.2 % (ref 4–15)
NEUTROPHILS # BLD AUTO: 3.1 K/UL (ref 1.8–7.7)
NEUTROPHILS NFR BLD: 54.7 % (ref 38–73)
NRBC BLD-RTO: 0 /100 WBC
PLATELET # BLD AUTO: 226 K/UL (ref 150–450)
PMV BLD AUTO: 9.8 FL (ref 9.2–12.9)
POTASSIUM SERPL-SCNC: 5.1 MMOL/L (ref 3.5–5.1)
PROT SERPL-MCNC: 7.5 G/DL (ref 6–8.4)
RBC # BLD AUTO: 6.03 M/UL (ref 4.6–6.2)
SATURATED IRON: 12 % (ref 20–50)
SODIUM SERPL-SCNC: 141 MMOL/L (ref 136–145)
T4 FREE SERPL-MCNC: 0.98 NG/DL (ref 0.71–1.51)
TOTAL IRON BINDING CAPACITY: 506 UG/DL (ref 250–450)
TRANSFERRIN SERPL-MCNC: 342 MG/DL (ref 200–375)
TSH SERPL DL<=0.005 MIU/L-ACNC: 4.73 UIU/ML (ref 0.4–4)
WBC # BLD AUTO: 5.67 K/UL (ref 3.9–12.7)

## 2024-09-05 PROCEDURE — 36415 COLL VENOUS BLD VENIPUNCTURE: CPT | Mod: HCNC,PO | Performed by: INTERNAL MEDICINE

## 2024-09-05 PROCEDURE — 84153 ASSAY OF PSA TOTAL: CPT | Mod: HCNC | Performed by: INTERNAL MEDICINE

## 2024-09-05 PROCEDURE — 85025 COMPLETE CBC W/AUTO DIFF WBC: CPT | Mod: HCNC | Performed by: INTERNAL MEDICINE

## 2024-09-05 PROCEDURE — 99999 PR PBB SHADOW E&M-EST. PATIENT-LVL III: CPT | Mod: PBBFAC,HCNC,, | Performed by: INTERNAL MEDICINE

## 2024-09-05 PROCEDURE — 80053 COMPREHEN METABOLIC PANEL: CPT | Mod: HCNC | Performed by: INTERNAL MEDICINE

## 2024-09-05 PROCEDURE — 83540 ASSAY OF IRON: CPT | Mod: HCNC | Performed by: INTERNAL MEDICINE

## 2024-09-05 PROCEDURE — 84439 ASSAY OF FREE THYROXINE: CPT | Mod: HCNC | Performed by: INTERNAL MEDICINE

## 2024-09-05 PROCEDURE — 84443 ASSAY THYROID STIM HORMONE: CPT | Mod: HCNC | Performed by: INTERNAL MEDICINE

## 2024-09-05 PROCEDURE — 82728 ASSAY OF FERRITIN: CPT | Mod: HCNC | Performed by: INTERNAL MEDICINE

## 2024-09-05 NOTE — PROGRESS NOTES
Chief complaint:   Physical exam    73-year-old white male.  colonoscopy 3 polyps 2/17 -5 polyps 7/23- 5 yrs. PSA 6/23 .  He is active and otherwise healthy      ROS:   CONST: weight stable. EYES: no vision change. ENT: no sore throat. CV: no chest pain w/ exertion. RESP: no shortness of breath. GI: no nausea, vomiting, diarrhea. No dysphagia. : no urinary issues. MUSCULOSKELETAL: no new myalgias or arthralgias. SKIN: no new changes. NEURO: no focal deficits. PSYCH: no new issues. ENDOCRINE: no polyuria. HEME: no lymph nodes. ALLERGY: no general pruritis.      Past medical history:  1.  Uncontrolled diabetes with neuropathy, followed by Dr. Agrawal  2.  Coronary artery disease with triple bypass March 2016, followed by the heart clinic. Now [facility], neg PET 2017, Nuc/echo neg 3/20,  3.  Back surgery 2002.  5.  Hyperlipidemia.  6.  Hypertension.  7.   colonoscopy 3 polyps 2/17 -5 polyps 7/23- 5 yrs  8.  Pneumovax and Prevnar given 2015 according to records  9.  Right leg radiculopathy, confirmed by MRI, did respond epidural with [facility] pain management  10. Partial small-bowel obstruction October 2018  11.  Abnormal TSH on occasion    Past Surgical History:   Procedure Laterality Date    ARTHROSCOPIC REPAIR OF ROTATOR CUFF OF SHOULDER Right 7/5/2022    Procedure: REPAIR, ROTATOR CUFF, ARTHROSCOPIC;  Surgeon: Valerie Olivera MD;  Location: [facility] OR;  Service: Orthopedics;  Laterality: Right;  GUADALUPE [facility] NEPHEW ALLAN [phone]/ TEXTED ALLAN ON 6/23/2022 @ 9:47AM. ALLAN RESPONDED ON 6/23/2022 @ 10:33AM-LO  JEAN PAUL BABIN [phone] MY BE HERE FOR CASE SPOKE TO HIM @ 9:59AM ON 7-1-2022  RN PREOP 6/28/2022    BACK SURGERY      2002    CATARACT EXTRACTION W/  INTRAOCULAR LENS IMPLANT Right 08/29/2017    Dr. Hong    CATARACT EXTRACTION W/  INTRAOCULAR LENS IMPLANT Left 02/24/2022    Dr. Hong    COLONOSCOPY N/A 2/21/2017    Procedure: COLONOSCOPY;  Surgeon: Robb Rosado MD;  Location: [facility] ENDO;   Service: Endoscopy;  Laterality: N/A;    COLONOSCOPY N/A 7/5/2023    Procedure: COLONOSCOPY;  Surgeon: Aston Varela MD;  Location: [facility] ENDO;  Service: Endoscopy;  Laterality: N/A;  Referral: JOVANNI SCANLON  ok to hold Plavix 5 days and Eliquis 2 days per Dr Ford   Meds  Suprep   Inst portal   LW    CORONARY ANGIOGRAPHY INCLUDING BYPASS GRAFTS WITH CATHETERIZATION OF LEFT HEART N/A 11/11/2020    Procedure: ANGIOGRAM, CORONARY, INCLUDING BYPASS GRAFT, WITH LEFT HEART CATHETERIZATION;  Surgeon: Lane Cuellar MD;  Location: [facility] CATH LAB;  Service: Cardiology;  Laterality: N/A;  RN PRE OP 11-5-2020. --COVID NEGATIVE ON  11-. C A    CORONARY ARTERY BYPASS GRAFT      March 2016    EPIDURAL STEROID INJECTION Bilateral 11/14/2018    Procedure: Lumbar Medial Branch Blocks;  Surgeon: Eze Byrd Jr., MD;  Location: [facility] ENDO;  Service: Pain Management;  Laterality: Bilateral;  Bilateral Lumbar Medial Branch Blocks L2, L3, L4, L5    [id number]  [id number]    Arrive @ 1150; NO Sedation    EPIDURAL STEROID INJECTION Bilateral 11/28/2018    Procedure: Injection, Steroid, Epidural;  Surgeon: Eze Byrd Jr., MD;  Location: [facility] ENDO;  Service: Pain Management;  Laterality: Bilateral;  Bilateral Sacroiliac Joint Steroid Injections    [id number]    Arrive @ 0910    EPIDURAL STEROID INJECTION Bilateral 3/20/2019    Procedure: Injection, Steroid, Sacroiliiac Joint;  Surgeon: Eze Byrd Jr., MD;  Location: [facility] ENDO;  Service: Pain Management;  Laterality: Bilateral;  Bilateral Sacroiliac Joint Steroid Injections    [id number]    Arrive @ 1145; Trigger point injections also?    EPIDURAL STEROID INJECTION Right 8/2/2023    Procedure: Right L5 Transforaminal Epidural Steroid Injection;  Surgeon: Eze Byrd Jr., MD;  Location: [facility] ENDO;  Service: Pain Management;  Laterality: Right;  @0830 (given)  Eliquis last 7/29  Plavix last 7/27  Check BG    EPIDURAL STEROID INJECTION Right 10/11/2023     Procedure: Right L3 (infraneural) + S1 Transforaminal Epidural Steroid Injections;  Surgeon: Eze Byrd Jr., MD;  Location: [facility] ENDO;  Service: Pain Management;  Laterality: Right;  @0745 (requests morning)  Eliquis 10/7 & Plavix 10  Check BG    ESOPHAGOGASTRODUODENOSCOPY N/A 2023    Procedure: EGD (ESOPHAGOGASTRODUODENOSCOPY);  Surgeon: Anita Oswald MD;  Location: [facility] ENDO;  Service: Endoscopy;  Laterality: N/A;  Procedure Timin-4 weeks  Provider: Any GI provider  Location: No Preference  Additional Scheduling Information: Blood thinners  Prep Specifications:N/A   ref. by Dr. Light, instr. to portal, , [facility]  ok to hold Plavix 5 days and Eliquis 2 day    INTRAOCULAR PROSTHESES INSERTION Left 2022    Procedure: INSERTION, IOL PROSTHESIS;  Surgeon: Nickolas Hong MD;  Location: [facility] OR;  Service: Ophthalmology;  Laterality: Left;    PHACOEMULSIFICATION OF CATARACT Left 2022    Procedure: PHACOEMULSIFICATION, CATARACT;  Surgeon: Nickolas Hong MD;  Location: [facility] OR;  Service: Ophthalmology;  Laterality: Left;  RN Phone Pre Op 22.  Covid NEGATIVE  22.  Arrival 08:00 am.    TONSILLECTOMY           Family history: Father  at 77 with stroke.  Mother  at 72 with heart problems, diabetes, hypertension.  2 sisters living in their 80s and one  at 82.  One brother  at 80 with some kidney disease and diabetes.  Sisters with diabetes, hypertension and stroke.  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  He drinks alcohol about 3 times.  Never smoked.      Vital signs as above,     Gen: no distress  EYES: conjunctiva clear, non-icteric, PERRL  ENT: nose clear, nasal mucosa normal, oropharynx clear and moist, teeth good  NECK:supple, thyroid non-palpable  RESP: effort is good, lungs clear  CV: heart RRR w/o murmur, gallops or  rubs; no carotid bruits, no edema  GI: abdomen soft, non-distended, non-tender, no hepatosplenomegaly  MS: gait normal, no clubbing or cyanosis of the digits  SKIN:  Circular erythematous patch on the arch of the right foot with some overlying flaking.  No secondary infection, warm to touch    Driss was seen today for pre-op exam.    Diagnoses and all orders for this visit:    Routine medical exam    Screening for prostate cancer- overdue    History of colonic polyps- UTD                                              Additional evaluation and management issues:    Additionally patient has other medical issues to address separate from his physical.  Patient has a list of issues.  Iron def in 2023 and no cbc in a year. UTD on colon. Gastritis on EGD 12/23.  Seen GI prior to EGD but plan presumed was cont PPI    Eating more ice will check for anemia , picca gone. On iron QOD            Seen [place]  ASSESSMENT:   # CAD s/p CABG/PCI OM 12/2020, MAYEN and cough much improved.   # HTN, uncontrolled  # HFpEF, mildly vol overloaded on exam, but improved vs prior OV.  # Chr AF on eliquis 5mg bid, HR controlled  # HLP on atorva 40mg  # PAD, abnl NUSRAT 4/18/18  # carotid atherosclerosis (CUS 3/2022)  # L>R LE edema, improved.  LE venous US 4/2022 neg.  # aortic atherosclerosis (CT Chest 10/29/18)  # preop for R shoulder ortho procedure.  The patient demonstrated good exercise capacity office today.     PLAN:   Cont med rx  The patient is at low cardiac risk for the planned orthopedic surgical procedure and associated anesthesia.  No further preoperative cardiac testing is required.  If needed, it is acceptable hold the Eliquis for 3 days prior to surgery and resume it as soon as possible thereafter.  If needed, it is acceptable to hold the Plavix for 5 days prior to surgery and resume it as soon as possible thereafter.  Cont Plavix 75mg qd  Cont eliquis 5mg bid  Cont lasix 20mg bid, may have to accept some degree of azotemia to  maintain euvolemia.  Dec amlod 5mg qd (?contributing to edema/vol overload), consider stopping the future.  Add hydrala 50mg bid  Check BMP 2 weeks  Enroll in digital med programs  RTC 4 weeks for review and titration of hydrala/stopping amlod.  Consider addition of entresto (cost concerns at present).  Check lipids/LFT 2 months (July 2022)  Surveillance carotid US 5 months (Sept 2022)      Also with uncontrolled diabetes which he says is worse due to dietary issues related to the pandemic but then 5/23 down to 6.6.  He got a new endocrinologist w [facility] as his one at [facility] now works for [employer] insurance requires him to see [facility].  We discussed some local options- seen Endo NP      He also occasionally take Halcion as Ambien we give him a hangover.  He does use Zanaflex at night to help him sleep.  We discussed the amnesia issues related to how she on any takes it rarely     Nuc and Echo ok 3/20    Regarding diabetes he was managed by outside Endocrine.  His A1c has been uncontrolled chronically -8.4 in may, 7.7, 8.2, 6.2 , 6.9, 7.4, 6.6, 6.4, 6.2, 6.5  .  We discussed diet improvement.  He has chosen to use regular insulin due to cost.  We discussed that it may well need to be taken 30 min before the meal and could last for hours and he was not aware of that.  He can discuss in greater detail with his endocrinologist.  Continues on tresiba 20  He is also on a weekly injection.  He was thinking about stopping it but encouraged him to do so as it may well be helping with his appetite over eating.    Discussed his medications that will cause glucose loss of urine and he will need to supplement fluid intake for what increased output he may have otherwise he can worsen renal insufficiency.  He does not like drinking water but will do so and discussed other water alternatives.    Apparently on a weekly injection and was discontinued due to the high triglycerides but I pointed out that is triglyceride  elevation has been something that has fluctuated over the years and more so relates to diet and his uncontrolled diabetes.  He is now on fish oil as well as another pill for the high triglycerides.  He is tolerating it with  continued reflux and we discussed trying a different preparation.  He has already put them in the freezer..  He will need reassessment in a couple of months.    The other issue is an elevated TSH.  It looks like his TSH went up into the seven range and then 4.2 a year ago.  I explained hypothyroidism to him at length.  He had a daughter who developed at age 17.  We discussed that it will not be a significant health issue for him to remain hypothyroid until he figures this out.  He would prefer not to take medication and it does look like the TSH is trending down it did this in the past as well.      He is due to update some lab work.    He has a sore patch on the arch of the right foot which is about a quarter-sized area of redness that appears fungal.  Hurts to touch probably from cracking.  He did use Nizoral cream and it improved but was taking so long so he quit.  Discussed it does need to be used for an extended period of time possibly with an occlusive dressing      Evaluation and management of all the separate issues would based on medical decision making.  Labs and x-ray reports reviewed          Assessment plan:            Type 2 diabetes mellitus with hyperglycemia, with long-term current use of insulin, chronic and stable and prior so Endocrine who apparently has deferred management to PCP.  Discussed getting A1c every three months    DM type 2 without retinopathy    Long-term insulin use    Essential hypertension, chronic and stable  -     TSH; Future    Stage 3a chronic kidney disease, last creatinine was a little bit higher, reassess  -     Comprehensive Metabolic Panel; Future    Chronic heart failure with preserved ejection fraction, appears compensated    Diabetic polyneuropathy  associated with type 2 diabetes mellitus    Atherosclerosis of coronary artery bypass graft with angina pectoris, unspecified whether native or transplanted heart, lipids under good control    Lumbar spondylosis, follows with pain management and current pain level is tolerable.  Sometimes when he lays a certain way he will get pain from the knee down which is not a Charley horse and he turns over and it goes away within a minute.  Not bad enough to pursue another epidural at this time    Longstanding persistent atrial fibrillation, chronic and stable    Sedative dependence, chronic and stable    Diabetes mellitus due to underlying condition with stage 3a chronic kidney disease, with long-term current use of insulin    Benign prostatic hyperplasia, unspecified whether lower urinary tract symptoms present  -     Prostate Specific Antigen, Diagnostic; Future    Vitamin D deficiency disease, chronic and stable    Bilateral sacroiliitis, stable at this time    Iron deficiency anemia, unspecified iron deficiency anemia type, reassess, on iron every other day with resolution of PICA so perhaps iron-deficiency improved.  He had an EGD and colonoscopy but never had stool testing.  If still significantly anemic may need to do stool testing and refer to GI for capsule endoscopy if positive  -     Comprehensive Metabolic Panel; Future  -     CBC Auto Differential; Future  -     TSH; Future  -     Iron and TIBC; Future  -     Ferritin; Future

## 2024-09-10 ENCOUNTER — HOSPITAL ENCOUNTER (OUTPATIENT)
Dept: CARDIOLOGY | Facility: HOSPITAL | Age: 74
Discharge: HOME | End: 2024-09-10
Attending: INTERNAL MEDICINE
Payer: MEDICARE

## 2024-09-10 DIAGNOSIS — I65.23 ATHEROSCLEROSIS OF BOTH CAROTID ARTERIES: ICD-10-CM

## 2024-09-10 LAB
LEFT CBA DIAS: 7 CM/S
LEFT CBA SYS: 67 CM/S
LEFT CCA DIST DIAS: 6 CM/S
LEFT CCA DIST SYS: 58 CM/S
LEFT CCA MID DIAS: 10 CM/S
LEFT CCA MID SYS: 77 CM/S
LEFT CCA PROX DIAS: 10 CM/S
LEFT CCA PROX SYS: 85 CM/S
LEFT ECA DIAS: 15 CM/S
LEFT ECA SYS: 218 CM/S
LEFT ICA DIST DIAS: 16 CM/S
LEFT ICA DIST SYS: 70 CM/S
LEFT ICA MID DIAS: 16 CM/S
LEFT ICA MID SYS: 107 CM/S
LEFT ICA PROX DIAS: 33 CM/S
LEFT ICA PROX SYS: 252 CM/S
LEFT VERTEBRAL DIAS: 8 CM/S
LEFT VERTEBRAL SYS: 61 CM/S
OHS CV CAROTID RIGHT ICA EDV HIGHEST: 32
OHS CV CAROTID ULTRASOUND LEFT ICA/CCA RATIO: 4.34
OHS CV CAROTID ULTRASOUND RIGHT ICA/CCA RATIO: 2.81
OHS CV PV CAROTID LEFT HIGHEST CCA: 85
OHS CV PV CAROTID LEFT HIGHEST ICA: 252
OHS CV PV CAROTID RIGHT HIGHEST CCA: 88
OHS CV PV CAROTID RIGHT HIGHEST ICA: 191
OHS CV US CAROTID LEFT HIGHEST EDV: 33
RIGHT CBA DIAS: 16 CM/S
RIGHT CBA SYS: 123 CM/S
RIGHT CCA DIST DIAS: 10 CM/S
RIGHT CCA DIST SYS: 68 CM/S
RIGHT CCA MID DIAS: 8 CM/S
RIGHT CCA MID SYS: 75 CM/S
RIGHT CCA PROX DIAS: 10 CM/S
RIGHT CCA PROX SYS: 88 CM/S
RIGHT ECA DIAS: 14 CM/S
RIGHT ECA SYS: 296 CM/S
RIGHT ICA DIST DIAS: 15 CM/S
RIGHT ICA DIST SYS: 87 CM/S
RIGHT ICA MID DIAS: 13 CM/S
RIGHT ICA MID SYS: 81 CM/S
RIGHT ICA PROX DIAS: 32 CM/S
RIGHT ICA PROX SYS: 191 CM/S
RIGHT VERTEBRAL DIAS: 18 CM/S
RIGHT VERTEBRAL SYS: 61 CM/S

## 2024-09-10 PROCEDURE — 93880 EXTRACRANIAL BILAT STUDY: CPT | Mod: 26,HCNC,, | Performed by: INTERNAL MEDICINE

## 2024-09-10 PROCEDURE — 93880 EXTRACRANIAL BILAT STUDY: CPT | Mod: HCNC

## 2024-09-12 RX ORDER — PANTOPRAZOLE SODIUM 40 MG/1
TABLET, DELAYED RELEASE ORAL
Qty: 90 TABLET | Refills: 3 | Status: SHIPPED | OUTPATIENT
Start: 2024-09-12

## 2024-09-14 DIAGNOSIS — E55.9 HYPOVITAMINOSIS D: ICD-10-CM

## 2024-09-17 RX ORDER — ERGOCALCIFEROL 1.25 MG/1
CAPSULE ORAL
Qty: 12 CAPSULE | Refills: 0 | Status: SHIPPED | OUTPATIENT
Start: 2024-09-17

## 2024-09-18 ENCOUNTER — OFFICE VISIT (OUTPATIENT)
Dept: PAIN MEDICINE | Facility: CLINIC | Age: 74
End: 2024-09-18
Payer: MEDICARE

## 2024-09-18 VITALS — OXYGEN SATURATION: 97 % | HEART RATE: 66 BPM | SYSTOLIC BLOOD PRESSURE: 165 MMHG | DIASTOLIC BLOOD PRESSURE: 79 MMHG

## 2024-09-18 DIAGNOSIS — M47.816 LUMBAR SPONDYLOSIS: Primary | ICD-10-CM

## 2024-09-18 DIAGNOSIS — M54.16 LUMBAR RADICULOPATHY: ICD-10-CM

## 2024-09-18 DIAGNOSIS — M51.36 DDD (DEGENERATIVE DISC DISEASE), LUMBAR: ICD-10-CM

## 2024-09-18 PROCEDURE — 3044F HG A1C LEVEL LT 7.0%: CPT | Mod: HCNC,CPTII,S$GLB, | Performed by: PAIN MEDICINE

## 2024-09-18 PROCEDURE — 1125F AMNT PAIN NOTED PAIN PRSNT: CPT | Mod: HCNC,CPTII,S$GLB, | Performed by: PAIN MEDICINE

## 2024-09-18 PROCEDURE — 1159F MED LIST DOCD IN RCRD: CPT | Mod: HCNC,CPTII,S$GLB, | Performed by: PAIN MEDICINE

## 2024-09-18 PROCEDURE — 1101F PT FALLS ASSESS-DOCD LE1/YR: CPT | Mod: HCNC,CPTII,S$GLB, | Performed by: PAIN MEDICINE

## 2024-09-18 PROCEDURE — 1160F RVW MEDS BY RX/DR IN RCRD: CPT | Mod: HCNC,CPTII,S$GLB, | Performed by: PAIN MEDICINE

## 2024-09-18 PROCEDURE — 3288F FALL RISK ASSESSMENT DOCD: CPT | Mod: HCNC,CPTII,S$GLB, | Performed by: PAIN MEDICINE

## 2024-09-18 PROCEDURE — 3072F LOW RISK FOR RETINOPATHY: CPT | Mod: HCNC,CPTII,S$GLB, | Performed by: PAIN MEDICINE

## 2024-09-18 PROCEDURE — 99214 OFFICE O/P EST MOD 30 MIN: CPT | Mod: HCNC,S$GLB,, | Performed by: PAIN MEDICINE

## 2024-09-18 PROCEDURE — 3066F NEPHROPATHY DOC TX: CPT | Mod: HCNC,CPTII,S$GLB, | Performed by: PAIN MEDICINE

## 2024-09-18 PROCEDURE — 3061F NEG MICROALBUMINURIA REV: CPT | Mod: HCNC,CPTII,S$GLB, | Performed by: PAIN MEDICINE

## 2024-09-18 PROCEDURE — 3078F DIAST BP <80 MM HG: CPT | Mod: HCNC,CPTII,S$GLB, | Performed by: PAIN MEDICINE

## 2024-09-18 PROCEDURE — 99999 PR PBB SHADOW E&M-EST. PATIENT-LVL IV: CPT | Mod: PBBFAC,HCNC,, | Performed by: PAIN MEDICINE

## 2024-09-18 PROCEDURE — 3077F SYST BP >= 140 MM HG: CPT | Mod: HCNC,CPTII,S$GLB, | Performed by: PAIN MEDICINE

## 2024-09-18 NOTE — H&P (VIEW-ONLY)
Subjective:     Patient ID: Driss Hernandez Jr. is a 73 y.o. male    Chief Complaint: Low-back Pain (Sharp pain in the center of lower back) and Leg Pain (Pain radiating down both legs from the knee down)      Referred by: No ref. provider found      HPI:    Interval History (9/18/24):  He returns today for follow up.  He reports that that he has been having worsening low back and lower extremity pain. This pain is different in quality and location from previous pain.  It is located in the midline lower lumbar region and will radiate to the b/l lower extremities from the knees to the feet/ankles. He denies any associated numbness/tingling, weakness or b/b dysfunction. Pain is mostly present in the mornings when getting out of bed or when initiating activity after rest. States that he has minimal pain when standing/walking. Also continues to have some right lower extremity radicular pain, but this is improved since last ANNIE.     Interval History PA (11/01/2023):  Patient returns to clinic for follow up.  Patient is s/p right infraneural L3, S1 transforaminal epidural steroid injection done on 10/11/2023 with 80% relief of right-sided lower back and extremity pain.  Notes improvement started approximately 1 week following the procedure.  Notes pain in his right lower extremity has been overall manageable.  Denies any numbness, tingling, weakness, bowel or bladder dysfunction.  Patient does note a recent fall where he landed on his knees and feels he strained his lower back.  Notes diffuse increased pain across his lumbar paraspinal region.  Denies any radiation of pain.  Also with increased pain in his bilateral knees, abrasions and bruising noted.  Notes the incident occurred approximately 1 week ago and symptoms have been improving since.  Patient has been taking over-the-counter pain medication with benefit.    Interval History PA (09/21/2023):  Patient returns to clinic for follow up.  Patient notes some  worsening of his chronic right-sided lower back and extremity pain.  Notes previous right L5 TF ANNIE done on August did provide some initial relief although short-lived.  States pain was manageable for a few weeks however has since been worsening.  Continues to endorse pain located in his right lumbar paraspinal region.  Majority of his pain is located from his right lateral hip radiating into his right anterior thigh, down to his knee.  Denies radiation distal to this point.  Denies any numbness, tingling, weakness, bowel bladder dysfunction.  Notes difficulty sleeping due to increased pain, worsened when lying supine.  Patient interested in proceeding with the previously discussed procedure.  States that he is not overtly interested in exploring surgical options at this time.    Interval History PA (08/17/2023):  Patient returns to clinic for follow up.  Patient is s/p right L5 transforaminal epidural steroid injection done on 08/02/2023 reporting some initial relief with the 1st few days following the procedure.  Overall continued 30% relief.  Denies any changes in the quality or location of his pain.  States that he has been having minimal back pain, only notices a pressure sensation in his lower lumbar spine.  The majority of his pain is located between his right lateral hip into his anterior thigh stopping at the knee.  States his pain is intermittent, typically only present when getting out of bed, or standing up from a chair.  Notes minimal to no pain with activity or rest.  States symptoms still bothersome but overall tolerable at this time.  Denies any numbness, tingling, weakness, bowel or bladder dysfunction.    Interval History (7/18/23):  He returns today for follow up and MRI review.  He reports that his pain is unchanged in quality and location since last encounter.  He denies any new or worsening symptoms.      Interval History (7/14/23):  He returns today for follow up.  He reports that he has been  having worsening low back and lower extremity pain.  States he continues to have bilateral low back pain as before though it has worsened since last encounter.  Also states that he is now having right-sided hip and thigh pain associated with his back pain.  These symptoms have been present for roughly 6 months.  This is typically present when sitting for prolonged periods of time.  States when he gets up he states that his thigh feels like it is being twisted.  The pain extends in a distribution consistent with the L3 dermatome.  Denies any associated paresthesias.  Does feel that the right lower extremity is weak when getting up after sitting though strength will return to normal after a few minutes.  Denies any associated bowel or bladder dysfunction.      Interval History NP (5/17/2022):  Mr Hernandez presents for delayed FU. Returning lower back pain where TPIs have done well in past and requesting repeat. He will be having shoulder surgery upcoming. Denies new areas of pain or neurological changes. No focal voicing of  myelopathic symptoms such as handwriting changes or difficulty with buttons/coins/keys. Denies perineal paresthesias, bowel/bladder dysfunction. Currently taking neurontin and zanaflex being taken minimally at this time.       Interval History (9/22/21):  He returns today for follow up.  He reports that TPIs done at last visit have been helpful. His low back is doing well and does not need any further attention at this time. He does continue to have intermittent severe right shoulder pain. He denies any changes in the quality or location of this pain since last encounter. He denies any new or worsened symptoms.       Interval History (8/19/21):  He returns today for follow up.  He reports that physical therapy has been helpful for the right neck and shoulder pain.  He states that his neck pain was never very severe and has now essentially resolved.  He states that the vast majority of his pain is  located right anterior shoulder.  The pain is exacerbated with certain movements and is particularly bothersome while sleeping.  He denies any associated numbness tingling with right shoulder pain.  Patient also states that he has recurrent low back pain.  Similar in quality and location to previous back pain that was well treated with trigger point injections.  He would like repeat trigger point injections done today      Interval History (6/4/21):  He returns today for follow up.  He reports that he has had right-sided neck and upper extremity pain for the past 2 weeks.  He states the pain started after handing his wife a heavy flower pot.  The pain is located the right lower cervical paraspinal region radiate to the right wrist with associated numbness and tingling in the fingers of the right hand.  He denies any focal weakness or bowel bladder dysfunction.  The pain is constant worse with activity.  The pain disrupts his sleep.       Interval History (1/6/20):  He returns today for follow up.  He reports that lumbar trigger point injections have been helpful for the bilateral low back pain. He reports greater than 85% relief for over 9 months.  He states the pain has returned.  He denies any changes in the quality or location was pain. He would like repeat trigger point injections today.      Interval History (3/18/19):  He returns today for follow up.  He reports that trigger point injections have been helpful for the right-sided low back pain. He reports near complete resolution of the sharp right-sided low back pain. He does report that his previous low back pain starting to return.  He feels as though his current pain is exact same pain that was previously helped by sacroiliac joint injections.  He is interested in repeat injections if appropriate.      Interval History (2/12/19):  He returns today for follow up and imaging review.  He reports that his pain is unchanged since last encounter.  He denies any  new or worsening neurologic symptoms.      Interval History (2/7/19):  He returns today for follow up.  He reports that he has been having acute right-sided low back pain for about 1 week.  He denies any specific inciting event or injury.  The pain is located in the lateral aspect of the right lumbosacral region.  It does not radiate.  He denies any associated numbness, tingling, weakness, bowel bladder dysfunction.  The pain varies in intensity from day-to-day.  The pain is much worse with coughing and sneezing and sitting with a forward flexed posture.  He is still able to go to the gym at times.      Interval History (12/17/18):  He returns today for follow up.  He reports that bilateral SIJ injections has been helpful for the low back pain. He reports about 80% relief. He does not feel that he needs any further interventions at this time      Interval History (11/19/18):  He returns today for follow up.  He reports that bilateral lumbar medial branch blocks were not helpful. Pain is unchanged in quality and location since last visit. He denies any new symptoms.       Interval History (10/16/18):  He returns today for follow up.  He reports that he has continued to have bilateral low back pain. He still has some right lower extremity pain, but this is not as bothersome as his low back pain. The pain is located across the lower lumbar region R>L. It does not radiate. It is not associated with any numbness, tingling, weakness or b/b dysfunction. The pain is worse with activity. He also requests refills of tizanidine.      Interval History (12/18/17):  He returns today for follow up.  He reports that right L4 TFESI has been helpful for the right lumbar radicular pain. He reports 100% relief. He still has some lower back pain, but would prefer to discuss this later. Otherwise he is doing well.       Interval History (11/13/17):  He returns today for follow up.  He reports that PT has been helpful for the low back  pain. He still has significant right foot and ankle pain when sitting in a reclined postion. This is the most painful area. He also has low back pain that is constant but does not bother him as much. Otherwise he is doing well.       Driss Hernandez Jr. is a 73 y.o. male who presents today with chronic bilateral low back pain R>>>L. Pain started over 40 years ago. No inciting injury or event noted. Pain is located mainly on the right side of the lower lumbar paraspinals. About 5 weeks ago, patient began to have right lwoer extremity pain that he felt was related to his back pain, but this has subsided. He denies any numbness, tingling, weakness, or b/b dysfunction. The pain is described as dull, but can become sharp at times. Patient states that his pain is worse in the morning. He does not sleep well. States that he wakes up every 45 minutes. Has taken Tizanidine PRN in the pat, but cannot recall how it affected him. Probably has not taken in over a year. Cannot take NSAIDs due to decreased renal function. Has taken in the past with mild relief.  This pain is described in detail below.    Physical Therapy:  Yes.    Non-pharmacologic Treatment: Nothing really helps         TENS? No    Pain Medications:         Currently taking: Gabapentin, Tizandine. Tylenol p.r.n., Celebrex    Has tried in the past:  NSAIDs    Has not tried: Opioids, Tylenol, TCAs, SNRIs, topical creams    Blood thinners:  Plavix, Eliquis    Interventional Therapies:   11/29/17 - Right L4 TFESI - 100% relief  10/28/18 - bilateral SIJ injections -  80% relief  11/14/18 - Bilateral L2, L3, L4 and L5 MBBs - No relief noted  3/20/19 - bilateral sacroiliac joint steroid injections  08/02/2023 - right L5 transforaminal epidural steroid injection - 30% relief  10/11/2023 - right L3 (infraneural), S1 TF ANNIE - 80% relief    Relevant Surgeries: Previous right L4 hemilaminectomy    Affecting sleep? Yes    Affecting daily activities? yes    Depressive  symptoms? no          SI/HI? No    Work status: Retired    Pain Scores:    Best:       3/10  Worst:     10/10  Usually:   3/10  Today:    3/10    Pain Disability Index  Family/Home Responsibilities:: 2  Recreation:: 2  Social Activity:: 2  Occupation:: 0  Sexual Behavior:: 0  Self Care:: 8  Life-Support Activities:: 10  Pain Disability Index (PDI): 24    Review of Systems   Constitutional:  Negative for activity change, appetite change, chills, fatigue, fever and unexpected weight change.   HENT:  Negative for hearing loss.    Eyes:  Negative for visual disturbance.   Respiratory:  Negative for chest tightness and shortness of breath.    Cardiovascular:  Negative for chest pain.   Gastrointestinal:  Negative for abdominal pain, constipation, diarrhea, nausea and vomiting.   Genitourinary:  Negative for difficulty urinating.   Musculoskeletal:  Positive for back pain, gait problem and myalgias. Negative for neck pain.   Skin:  Negative for rash.   Neurological:  Positive for weakness. Negative for dizziness, light-headedness, numbness and headaches.   Psychiatric/Behavioral:  Positive for sleep disturbance. Negative for hallucinations and suicidal ideas. The patient is not nervous/anxious.        Past Medical History:   Diagnosis Date    Chronic heart failure with preserved ejection fraction 2/9/2023    Chronic midline low back pain without sciatica 10/2/2017    Colon polyps     Coronary artery disease involving native coronary artery of native heart 3/5/2020    Coronary artery disease involving native coronary artery of native heart with angina pectoris 12/10/2020    Diabetes mellitus with neurological manifestations, uncontrolled 1/24/2017    Diabetic polyneuropathy associated with type 2 diabetes mellitus 1/24/2017    Diabetic polyneuropathy associated with type 2 diabetes mellitus     Essential hypertension 1/24/2017    Gastroesophageal reflux disease 1/24/2017    Hyperlipidemia 1/24/2017    Insomnia 1/24/2017     Long-term insulin use 1/24/2017    Longstanding persistent atrial fibrillation 12/30/2020    Lumbar spondylosis 11/13/2017    Nuclear sclerosis of both eyes 8/24/2017    Obesity     PAD (peripheral artery disease) 3/23/2022    Stage 3 chronic kidney disease 2/13/2019    Uncontrolled type 2 diabetes mellitus without complication, with long-term current use of insulin 1/24/2017       Past Surgical History:   Procedure Laterality Date    ARTHROSCOPIC REPAIR OF ROTATOR CUFF OF SHOULDER Right 7/5/2022    Procedure: REPAIR, ROTATOR CUFF, ARTHROSCOPIC;  Surgeon: Valerie Olivera MD;  Location: [facility] OR;  Service: Orthopedics;  Laterality: Right;  VARELA AND NEPHJENNIFER LEMUS [phone]/ TEXTED ALLAN ON 6/23/2022 @ 9:47AM. ALLAN RESPONDED ON 6/23/2022 @ 10:33AM-LO  JEAN PAUL BABIN [phone] MY BE HERE FOR CASE SPOKE TO HIM @ 9:59AM ON 7-1-2022  RN PREOP 6/28/2022    BACK SURGERY      2002    CATARACT EXTRACTION W/  INTRAOCULAR LENS IMPLANT Right 08/29/2017    Dr. Hong    CATARACT EXTRACTION W/  INTRAOCULAR LENS IMPLANT Left 02/24/2022    Dr. Hong    COLONOSCOPY N/A 2/21/2017    Procedure: COLONOSCOPY;  Surgeon: Robb Rosado MD;  Location: [facility] ENDO;  Service: Endoscopy;  Laterality: N/A;    COLONOSCOPY N/A 7/5/2023    Procedure: COLONOSCOPY;  Surgeon: Aston Varela MD;  Location: [facility] ENDO;  Service: Endoscopy;  Laterality: N/A;  Referral: JOVANNI SCANLON to hold Plavix 5 days and Eliquis 2 days per Dr [facility] Meds  Suprep   Inst portal   LW    CORONARY ANGIOGRAPHY INCLUDING BYPASS GRAFTS WITH CATHETERIZATION OF LEFT HEART N/A 11/11/2020    Procedure: ANGIOGRAM, CORONARY, INCLUDING BYPASS GRAFT, WITH LEFT HEART CATHETERIZATION;  Surgeon: Lane Cuellar MD;  Location: [facility] CATH LAB;  Service: Cardiology;  Laterality: N/A;  RN PRE OP 11-5-2020. --COVID NEGATIVE ON  11-. C A    CORONARY ARTERY BYPASS GRAFT      March 2016    EPIDURAL STEROID INJECTION Bilateral 11/14/2018     Procedure: Lumbar Medial Branch Blocks;  Surgeon: Eze Byrd Jr., MD;  Location: [facility] ENDO;  Service: Pain Management;  Laterality: Bilateral;  Bilateral Lumbar Medial Branch Blocks L2, L3, L4, L5    [id number]  [id number]    Arrive @ 1150; NO Sedation    EPIDURAL STEROID INJECTION Bilateral 2018    Procedure: Injection, Steroid, Epidural;  Surgeon: Eze Byrd Jr., MD;  Location: [facility] ENDO;  Service: Pain Management;  Laterality: Bilateral;  Bilateral Sacroiliac Joint Steroid Injections    90832    Arrive @ 0910    EPIDURAL STEROID INJECTION Bilateral 3/20/2019    Procedure: Injection, Steroid, Sacroiliiac Joint;  Surgeon: Eze Byrd Jr., MD;  Location: [facility] ENDO;  Service: Pain Management;  Laterality: Bilateral;  Bilateral Sacroiliac Joint Steroid Injections    13310    Arrive @ 1145; Trigger point injections also?    EPIDURAL STEROID INJECTION Right 2023    Procedure: Right L5 Transforaminal Epidural Steroid Injection;  Surgeon: Eze Byrd Jr., MD;  Location: [facility] ENDO;  Service: Pain Management;  Laterality: Right;  @0830 (given)  Eliquis last   Plavix last   Check BG    EPIDURAL STEROID INJECTION Right 10/11/2023    Procedure: Right L3 (infraneural) + S1 Transforaminal Epidural Steroid Injections;  Surgeon: Eze Byrd Jr., MD;  Location: [facility];  Service: Pain Management;  Laterality: Right;  @0745 (requests morning)  Eliquis 10/7 & Plavix 10/5  Check BG    ESOPHAGOGASTRODUODENOSCOPY N/A 2023    Procedure: EGD (ESOPHAGOGASTRODUODENOSCOPY);  Surgeon: Anita Oswald MD;  Location: [facility];  Service: Endoscopy;  Laterality: N/A;  Procedure Timin-4 weeks  Provider: Any GI provider  Location: No Preference  Additional Scheduling Information: Blood thinners  Prep Specifications:N/A   ref. by Dr. Light, instr. to portal, ,  meds-st  ok to hold Plavix 5 days and Eliquis 2 day    INTRAOCULAR PROSTHESES INSERTION Left 2022     Procedure: INSERTION, IOL PROSTHESIS;  Surgeon: Nickolas Hong MD;  Location: [facility] OR;  Service: Ophthalmology;  Laterality: Left;    PHACOEMULSIFICATION OF CATARACT Left 2/24/2022    Procedure: PHACOEMULSIFICATION, CATARACT;  Surgeon: Nickolas Hong MD;  Location: [facility] OR;  Service: Ophthalmology;  Laterality: Left;  RN Phone Pre Op 2-16-22.  Covid NEGATIVE  2-23-22.  Arrival 08:00 am.    TONSILLECTOMY         Social History     Socioeconomic History    Marital status:    Tobacco Use    Smoking status: Never     Passive exposure: Never    Smokeless tobacco: Never   Substance and Sexual Activity    Alcohol use: Yes     Comment: rare occassion    Drug use: No    Sexual activity: Yes     Partners: Female     Social Determinants of Health     Financial Resource Strain: Low Risk  (1/22/2024)    Overall Financial Resource Strain (CARDIA)     Difficulty of Paying Living Expenses: Not hard at all   Food Insecurity: No Food Insecurity (1/22/2024)    Hunger Vital Sign     Worried About Running Out of Food in the Last Year: Never true     Ran Out of Food in the Last Year: Never true   Transportation Needs: No Transportation Needs (1/22/2024)    PRAPARE - Transportation     Lack of Transportation (Medical): No     Lack of Transportation (Non-Medical): No   Physical Activity: Insufficiently Active (1/22/2024)    Exercise Vital Sign     Days of Exercise per Week: 3 days     Minutes of Exercise per Session: 30 min   Stress: Stress Concern Present (1/22/2024)    [employer] - Occupational Stress Questionnaire     Feeling of Stress : To some extent   Housing Stability: Low Risk  (1/22/2024)    Housing Stability Vital Sign     Unable to Pay for Housing in the Last Year: No     Number of Places Lived in the Last Year: 1     Unstable Housing in the Last Year: No       Review of patient's allergies indicates:   Allergen Reactions    Penicillins Other (See Comments)     Unknown  reaction, Had a reaction as a child    Shrimp Itching     Hand itching       Current Outpatient Medications on File Prior to Visit   Medication Sig Dispense Refill    albuterol (PROVENTIL/VENTOLIN HFA) 90 mcg/actuation inhaler Inhale 2 puffs into the lungs every 4 (four) hours as needed for Shortness of Breath. Rescue 17 g 3    ascorbic acid, vitamin C, (VITAMIN C) 100 MG tablet Take 100 mg by mouth daily as needed.      atorvastatin (LIPITOR) 80 MG tablet TAKE 1 TABLET EVERY EVENING 90 tablet 3    azelastine (ASTELIN) 137 mcg (0.1 %) nasal spray 1 spray (137 mcg total) by Nasal route 2 (two) times daily. 30 mL 3    b complex vitamins tablet Take 1 tablet by mouth once daily.      BD ALCOHOL SWABS PadM       blood sugar diagnostic Strp Check blood glucose 2 times a day. True metrix. E11.65 200 strip 3    celecoxib (CELEBREX) 100 MG capsule Take 1 capsule (100 mg total) by mouth once daily. 30 capsule 6    clopidogreL (PLAVIX) 75 mg tablet Take 1 tablet (75 mg total) by mouth once daily. 90 tablet 3    coenzyme Q10 100 mg capsule Take 100 mg by mouth once daily.      dapagliflozin (FARXIGA) 5 mg Tab tablet Take 1 tablet (5 mg total) by mouth once daily. 30 tablet 3    dexamethasone sodium phosp/PF (DEXAMETHASONE SODIUM PHOS, PF,) 10 mg/mL Soln       ELIQUIS 5 mg Tab TAKE 1 TABLET TWICE DAILY 180 tablet 3    ergocalciferol (ERGOCALCIFEROL) 50,000 unit Cap TAKE 1 CAPSULE BY MOUTH WEEKLY 12 capsule 0    fenofibrate 160 MG Tab TAKE 1 TABLET EVERY MORNING 90 tablet 12    fluticasone propionate (FLONASE) 50 mcg/actuation nasal spray 2 sprays (100 mcg total) by Each Nostril route 2 (two) times daily. 18.2 mL 3    furosemide (LASIX) 20 MG tablet TAKE 1 TABLET TWICE DAILY 180 tablet 3    gabapentin (NEURONTIN) 100 MG capsule TAKE 2 CAPSULES EVERY MORNING AND TAKE 3 CAPSULES EVERY DAY WITH DINNER 450 capsule 12    glimepiride (AMARYL) 1 MG tablet TAKE 1 TABLET BEFORE BREAKFAST 90 tablet 3    hydrALAZINE (APRESOLINE) 50 MG  "tablet Take 1 tablet (50 mg total) by mouth 2 (two) times a day. 180 tablet 3    insulin degludec (TRESIBA FLEXTOUCH U-100) 100 unit/mL (3 mL) insulin pen Inject 20 Units into the skin once daily. 30 mL 4    isosorbide mononitrate (IMDUR) 120 MG 24 hr tablet Take 1 tablet (120 mg total) by mouth once daily. 90 tablet 3    ketoconazole (NIZORAL) 2 % cream Apply topically once daily. 60 g 6    lancets Misc Check blood glucose 2x/day. True metrix. E11.65 200 each 3    magnesium 250 mg Tab Take 1 tablet by mouth once daily.       metFORMIN (GLUCOPHAGE-XR) 500 MG ER 24hr tablet Take 1 tablet with breakfast and 2 tablets with supper. 270 tablet 2    omega-3 acid ethyl esters (LOVAZA) 1 gram capsule Take 2 capsules (2 g total) by mouth 2 (two) times daily. 360 capsule 12    OMNIPAQUE 300 300 mg iodine/mL injection       pantoprazole (PROTONIX) 40 MG tablet TAKE 1 TABLET EVERY DAY 90 tablet 3    pen needle, diabetic 32 gauge x 1/4" Ndle Use daily 200 each 3    semaglutide (OZEMPIC) 2 mg/dose (8 mg/3 mL) PnIj Inject 2 mg into the skin every 7 days. 4 each 3    triazolam (HALCION) 0.25 MG Tab TAKE 1 TABLET BY MOUTH EVERY NIGHT AT BEDTIME AS NEEDED 90 tablet 5    triazolam (HALCION) 0.25 MG Tab TAKE 1 TABLET BY MOUTH EVERY NIGHT AT BEDTIME AS NEEDED 30 tablet 1    TRUEPLUS LANCETS 33 gauge Misc       blood-glucose meter kit Test glucose 2-3x/day. True metrix 1 each 0    nitroGLYCERIN (NITROSTAT) 0.4 MG SL tablet Place 1 tablet (0.4 mg total) under the tongue every 5 (five) minutes as needed for Chest pain. 25 tablet 11     Current Facility-Administered Medications on File Prior to Visit   Medication Dose Route Frequency Provider Last Rate Last Admin    cyclopentolate 1% ophthalmic solution 1 drop  1 drop Left Eye On Call Procedure Nickolas Hong MD   1 drop at 02/24/22 0901    ofloxacin 0.3 % ophthalmic solution 1 drop  1 drop Left Eye On Call Procedure Nickolas Hong MD   2 drop at 02/24/22 1017    sodium " chloride 0.9% flush 10 mL  10 mL Intravenous PRN Nickolas Hong MD        triamcinolone acetonide injection 40 mg  40 mg Intra-articular  Valerie Olivera MD   40 mg at 02/09/23 1030       Objective:      BP (!) 165/79 (BP Location: Left arm, Patient Position: Sitting, BP Method: Medium (Automatic))   Pulse 66   SpO2 97%     Exam:  GEN:  Well developed, well nourished.  No acute distress.  Normal pain behavior.  HEENT:  No trauma.  Mucous membranes moist.  Nares patent bilaterally.  PSYCH: Normal affect. Thought content appropriate.  CHEST:  Breathing symmetric.  No audible wheezing.  ABD: Soft, non-distended.  SKIN:  Warm, pink, dry.  No rash on exposed areas.    EXT:  No cyanosis, clubbing, or edema.  No color change or changes in nail or hair growth.  NEURO/MUSCULOSKELETAL:  Fully alert, oriented, and appropriate. Speech normal ivania. No cranial nerve deficits.   Gait:  Antalgic.  No trendelenburg sign bilaterally.   Motor Strength:  5/5 motor strength throughout lower extremities.   Sensory:  No sensory deficit in the lower extremities.   L-Spine:  Limited ROM with pain on extension.  Negative pain with axial/facet loading bilaterally.  Negative SLR bilaterally.    Diffusely TTP over bilateral upper and lower lumbar paraspinals        Imaging:    Narrative & Impression    EXAMINATION:  MRI LUMBAR SPINE WITHOUT CONTRAST     CLINICAL HISTORY:  Lumbar radiculopathy, symptoms persist with conservative treatment; Spondylosis without myelopathy or radiculopathy, lumbar region     TECHNIQUE:  Multiplanar, multisequence MR images were acquired from the thoracolumbar junction to the sacrum without the administration of contrast.     COMPARISON:  02/11/2019.     FINDINGS:  There is susceptibility weighted artifact within the midline lower lumbar spine, suggestive of prior instrumentation.     The lumbar alignment is within normal limits.  There are scattered Schmorl's nodes.  The vertebral body heights are  maintained.  The bone marrow signal is within normal limits.     The conus terminates at the level of T12.  The cauda equina nerve roots are within normal limits.     There is multilevel disc desiccation.  Evaluation of the individual disc levels reveals the following:     L1-L2, there is a disc bulge along with facet hypertrophy and ligamentum flavum hypertrophy.  The spinal canal and neural foramina are unremarkable.     L2-L3, there is a disc bulge along with facet hypertrophy and ligamentum flavum hypertrophy.  The spinal canal and neural foramina are unremarkable There is small amount of fluid within the facet joints.     L3-L4, there is diffuse disc bulge along with facet hypertrophy and ligamentum flavum.  The spinal canal is within normal limits.  There is mild spinal canal narrowing.     L4-L5, there is diffuse disc bulge along with facet hypertrophy and ligamentum flavum hypertrophy.  There is superimposed central disc protrusion.  There is marked narrowing of the lateral recesses.  There is mild to moderate narrowing the spinal canal.  There is moderate bilateral neural foraminal narrowing.     L5-S1, there is diffuse disc bulge along with facet hypertrophy and ligamentum flavum.  The spinal canal is within normal limits.  There is moderate neural foraminal narrowing.     The paraspinal soft tissues are unremarkable.     Impression:     Changes of prior instrumentation in the lower lumbar spine at the L4-L5 level.  Unchanged appearance of soft tissue at the prior disc site at the L4-L5 level.     Mild to moderate narrowing of the spinal canal at the L4-L5 level.     Mild-to-moderate neural foraminal narrowing the lower lumbar spine as above.        Electronically signed by: Kevin Erickson MD  Date:                                            07/18/2023  Time:                                           09:04       Narrative & Impression    EXAMINATION:  XR CERVICAL SPINE AP LAT WITH FLEX EXTEN     CLINICAL  HISTORY:  Other cervical disc degeneration, unspecified cervical region     TECHNIQUE:  Three views of the cervical spine plus flexion and extension views were performed.     COMPARISON:  None     FINDINGS:  Cervical spine is normal in alignment, vertebral body heights are maintained.  No displaced fracture or subluxation.  No suspicious bone lesion.     No instability between the flexion and extension images.     Mild and moderate multilevel degenerative disc disease and uncovertebral DJD.  Anterior osteophytosis at levels C2-3, C4-5, and C5-6.  Mild multilevel facet DJD.     Unremarkable soft tissues.     Impression:     No acute abnormality.  No instability.     Mild and moderate multilevel DJD.        Electronically signed by: Marco Vásquez MD  Date:                                            08/18/2021  Time:                                           08:35           Narrative     EXAMINATION:  MRI LUMBAR SPINE WITHOUT CONTRAST    CLINICAL HISTORY:  lumbar ddd; Other intervertebral disc degeneration, lumbar region    TECHNIQUE:  Multiplanar, multisequence MR images were acquired from the thoracolumbar junction to the sacrum without the administration of contrast.    COMPARISON:  08/23/2017    FINDINGS:  There is no evidence of fracture or marrow replacement process.  There is disc desiccation at all lumbar levels with disc space narrowing at multiple levels but most significant at L4-5.  Sagittal alignment is maintained.  Conus terminates at T12-L1.  Visualized retroperitoneal structures demonstrate no significant abnormalities.    T12-L1, L1-L2: Mild diffuse disc bulge with no significant central canal stenosis or neural foraminal narrowing.    L2-L3: Mild diffuse disc bulge with moderate facet arthropathy.  No significant central canal stenosis or neural foraminal narrowing.    L3-4: Mild diffuse disc bulge extending into both neural foramen with moderate facet arthropathy causing moderate right and mild left  neural foraminal narrowing.  No significant central canal stenosis.    L4-5: Hemilaminectomy defect on the right.  There is a diffuse disc bulge extending into both neural foramen with a superimposed central extrusion with an annular fissure extending slightly below the level of the disc plane.  This causes mass effect on the lateral recess and may impinge on the descending nerve roots although the canal is decompressed posteriorly by the laminectomy defect with no significant central canal stenosis.  There is severe facet arthropathy contributing to severe bilateral neural foraminal narrowing.    L5-S1: There is severe facet arthropathy.  There is a diffuse disc bulge with no significant central canal stenosis.  There is severe bilateral neural foraminal narrowing..   Impression       Postoperative changes at L4 on the right with decompression of the central canal.  There is lumbar spondylosis most significant at L4-5 and L5-S1 where there is severe bilateral neural foraminal narrowing.  Specific details at each level are discussed above.      Electronically signed by: Quinton Goins MD  Date: 02/12/2019  Time: 10:10         Assessment:       Encounter Diagnoses   Name Primary?    Lumbar spondylosis Yes    DDD (degenerative disc disease), lumbar     Lumbar radiculopathy      Plan:       Driss was seen today for low-back pain and leg pain.    Diagnoses and all orders for this visit:    Lumbar spondylosis    DDD (degenerative disc disease), lumbar    Lumbar radiculopathy      Driss Burttima Akbar is a 73 y.o. male with worsening chronic midline/bilateral low back pain.  No overt neurologic deficits noted on examination.  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  This is chronic.  Mild improvement following right L5 TF ANNIE.  Significant improvement with right L3 infraneural, S1 TF ANNIE.  Today with more recent exacerbation of midline low back and b/l lower extremity pain.  I suspect that this might be related  to acute intervertebral disc herniation and associated radiculitis/radiculopathy.    Prior records reviewed.  Pertinent imaging studies reviewed by me. Imaging results were discussed with patient.  Schedule for caudal ANNIE.  Patient can continue with current medications since they are providing benefit, using them appropriately, and without adverse effects.  Return to clinic after procedure to discuss results. May consider updating lumbar MRI if pain persists or worsens.         This note was created by combination of typed  and M-Modal dictation.  Transcription and phonetic errors may be present.  If there are any questions, please contact me.

## 2024-09-18 NOTE — PROGRESS NOTES
Subjective:     Patient ID: Driss Hernandez Jr. is a 73 y.o. male    Chief Complaint: Low-back Pain (Sharp pain in the center of lower back) and Leg Pain (Pain radiating down both legs from the knee down)      Referred by: No ref. provider found      HPI:    Interval History (9/18/24):  He returns today for follow up.  He reports that that he has been having worsening low back and lower extremity pain. This pain is different in quality and location from previous pain.  It is located in the midline lower lumbar region and will radiate to the b/l lower extremities from the knees to the feet/ankles. He denies any associated numbness/tingling, weakness or b/b dysfunction. Pain is mostly present in the mornings when getting out of bed or when initiating activity after rest. States that he has minimal pain when standing/walking. Also continues to have some right lower extremity radicular pain, but this is improved since last ANNIE.     Interval History PA (11/01/2023):  Patient returns to clinic for follow up.  Patient is s/p right infraneural L3, S1 transforaminal epidural steroid injection done on 10/11/2023 with 80% relief of right-sided lower back and extremity pain.  Notes improvement started approximately 1 week following the procedure.  Notes pain in his right lower extremity has been overall manageable.  Denies any numbness, tingling, weakness, bowel or bladder dysfunction.  Patient does note a recent fall where he landed on his knees and feels he strained his lower back.  Notes diffuse increased pain across his lumbar paraspinal region.  Denies any radiation of pain.  Also with increased pain in his bilateral knees, abrasions and bruising noted.  Notes the incident occurred approximately 1 week ago and symptoms have been improving since.  Patient has been taking over-the-counter pain medication with benefit.    Interval History PA (09/21/2023):  Patient returns to clinic for follow up.  Patient notes some  worsening of his chronic right-sided lower back and extremity pain.  Notes previous right L5 TF ANNIE done on August did provide some initial relief although short-lived.  States pain was manageable for a few weeks however has since been worsening.  Continues to endorse pain located in his right lumbar paraspinal region.  Majority of his pain is located from his right lateral hip radiating into his right anterior thigh, down to his knee.  Denies radiation distal to this point.  Denies any numbness, tingling, weakness, bowel bladder dysfunction.  Notes difficulty sleeping due to increased pain, worsened when lying supine.  Patient interested in proceeding with the previously discussed procedure.  States that he is not overtly interested in exploring surgical options at this time.    Interval History PA (08/17/2023):  Patient returns to clinic for follow up.  Patient is s/p right L5 transforaminal epidural steroid injection done on 08/02/2023 reporting some initial relief with the 1st few days following the procedure.  Overall continued 30% relief.  Denies any changes in the quality or location of his pain.  States that he has been having minimal back pain, only notices a pressure sensation in his lower lumbar spine.  The majority of his pain is located between his right lateral hip into his anterior thigh stopping at the knee.  States his pain is intermittent, typically only present when getting out of bed, or standing up from a chair.  Notes minimal to no pain with activity or rest.  States symptoms still bothersome but overall tolerable at this time.  Denies any numbness, tingling, weakness, bowel or bladder dysfunction.    Interval History (7/18/23):  He returns today for follow up and MRI review.  He reports that his pain is unchanged in quality and location since last encounter.  He denies any new or worsening symptoms.      Interval History (7/14/23):  He returns today for follow up.  He reports that he has been  having worsening low back and lower extremity pain.  States he continues to have bilateral low back pain as before though it has worsened since last encounter.  Also states that he is now having right-sided hip and thigh pain associated with his back pain.  These symptoms have been present for roughly 6 months.  This is typically present when sitting for prolonged periods of time.  States when he gets up he states that his thigh feels like it is being twisted.  The pain extends in a distribution consistent with the L3 dermatome.  Denies any associated paresthesias.  Does feel that the right lower extremity is weak when getting up after sitting though strength will return to normal after a few minutes.  Denies any associated bowel or bladder dysfunction.      Interval History NP (5/17/2022):  Mr Hernandez presents for delayed FU. Returning lower back pain where TPIs have done well in past and requesting repeat. He will be having shoulder surgery upcoming. Denies new areas of pain or neurological changes. No focal voicing of  myelopathic symptoms such as handwriting changes or difficulty with buttons/coins/keys. Denies perineal paresthesias, bowel/bladder dysfunction. Currently taking neurontin and zanaflex being taken minimally at this time.       Interval History (9/22/21):  He returns today for follow up.  He reports that TPIs done at last visit have been helpful. His low back is doing well and does not need any further attention at this time. He does continue to have intermittent severe right shoulder pain. He denies any changes in the quality or location of this pain since last encounter. He denies any new or worsened symptoms.       Interval History (8/19/21):  He returns today for follow up.  He reports that physical therapy has been helpful for the right neck and shoulder pain.  He states that his neck pain was never very severe and has now essentially resolved.  He states that the vast majority of his pain is  located right anterior shoulder.  The pain is exacerbated with certain movements and is particularly bothersome while sleeping.  He denies any associated numbness tingling with right shoulder pain.  Patient also states that he has recurrent low back pain.  Similar in quality and location to previous back pain that was well treated with trigger point injections.  He would like repeat trigger point injections done today      Interval History (6/4/21):  He returns today for follow up.  He reports that he has had right-sided neck and upper extremity pain for the past 2 weeks.  He states the pain started after handing his wife a heavy flower pot.  The pain is located the right lower cervical paraspinal region radiate to the right wrist with associated numbness and tingling in the fingers of the right hand.  He denies any focal weakness or bowel bladder dysfunction.  The pain is constant worse with activity.  The pain disrupts his sleep.       Interval History (1/6/20):  He returns today for follow up.  He reports that lumbar trigger point injections have been helpful for the bilateral low back pain. He reports greater than 85% relief for over 9 months.  He states the pain has returned.  He denies any changes in the quality or location was pain. He would like repeat trigger point injections today.      Interval History (3/18/19):  He returns today for follow up.  He reports that trigger point injections have been helpful for the right-sided low back pain. He reports near complete resolution of the sharp right-sided low back pain. He does report that his previous low back pain starting to return.  He feels as though his current pain is exact same pain that was previously helped by sacroiliac joint injections.  He is interested in repeat injections if appropriate.      Interval History (2/12/19):  He returns today for follow up and imaging review.  He reports that his pain is unchanged since last encounter.  He denies any  new or worsening neurologic symptoms.      Interval History (2/7/19):  He returns today for follow up.  He reports that he has been having acute right-sided low back pain for about 1 week.  He denies any specific inciting event or injury.  The pain is located in the lateral aspect of the right lumbosacral region.  It does not radiate.  He denies any associated numbness, tingling, weakness, bowel bladder dysfunction.  The pain varies in intensity from day-to-day.  The pain is much worse with coughing and sneezing and sitting with a forward flexed posture.  He is still able to go to the gym at times.      Interval History (12/17/18):  He returns today for follow up.  He reports that bilateral SIJ injections has been helpful for the low back pain. He reports about 80% relief. He does not feel that he needs any further interventions at this time      Interval History (11/19/18):  He returns today for follow up.  He reports that bilateral lumbar medial branch blocks were not helpful. Pain is unchanged in quality and location since last visit. He denies any new symptoms.       Interval History (10/16/18):  He returns today for follow up.  He reports that he has continued to have bilateral low back pain. He still has some right lower extremity pain, but this is not as bothersome as his low back pain. The pain is located across the lower lumbar region R>L. It does not radiate. It is not associated with any numbness, tingling, weakness or b/b dysfunction. The pain is worse with activity. He also requests refills of tizanidine.      Interval History (12/18/17):  He returns today for follow up.  He reports that right L4 TFESI has been helpful for the right lumbar radicular pain. He reports 100% relief. He still has some lower back pain, but would prefer to discuss this later. Otherwise he is doing well.       Interval History (11/13/17):  He returns today for follow up.  He reports that PT has been helpful for the low back  pain. He still has significant right foot and ankle pain when sitting in a reclined postion. This is the most painful area. He also has low back pain that is constant but does not bother him as much. Otherwise he is doing well.       Driss Hernandez Jr. is a 73 y.o. male who presents today with chronic bilateral low back pain R>>>L. Pain started over 40 years ago. No inciting injury or event noted. Pain is located mainly on the right side of the lower lumbar paraspinals. About 5 weeks ago, patient began to have right lwoer extremity pain that he felt was related to his back pain, but this has subsided. He denies any numbness, tingling, weakness, or b/b dysfunction. The pain is described as dull, but can become sharp at times. Patient states that his pain is worse in the morning. He does not sleep well. States that he wakes up every 45 minutes. Has taken Tizanidine PRN in the pat, but cannot recall how it affected him. Probably has not taken in over a year. Cannot take NSAIDs due to decreased renal function. Has taken in the past with mild relief.  This pain is described in detail below.    Physical Therapy:  Yes.    Non-pharmacologic Treatment: Nothing really helps         TENS? No    Pain Medications:         Currently taking: Gabapentin, Tizandine. Tylenol p.r.n., Celebrex    Has tried in the past:  NSAIDs    Has not tried: Opioids, Tylenol, TCAs, SNRIs, topical creams    Blood thinners:  Plavix, Eliquis    Interventional Therapies:   11/29/17 - Right L4 TFESI - 100% relief  10/28/18 - bilateral SIJ injections -  80% relief  11/14/18 - Bilateral L2, L3, L4 and L5 MBBs - No relief noted  3/20/19 - bilateral sacroiliac joint steroid injections  08/02/2023 - right L5 transforaminal epidural steroid injection - 30% relief  10/11/2023 - right L3 (infraneural), S1 TF ANNIE - 80% relief    Relevant Surgeries: Previous right L4 hemilaminectomy    Affecting sleep? Yes    Affecting daily activities? yes    Depressive  symptoms? no          SI/HI? No    Work status: Retired    Pain Scores:    Best:       3/10  Worst:     10/10  Usually:   3/10  Today:    3/10    Pain Disability Index  Family/Home Responsibilities:: 2  Recreation:: 2  Social Activity:: 2  Occupation:: 0  Sexual Behavior:: 0  Self Care:: 8  Life-Support Activities:: 10  Pain Disability Index (PDI): 24    Review of Systems   Constitutional:  Negative for activity change, appetite change, chills, fatigue, fever and unexpected weight change.   HENT:  Negative for hearing loss.    Eyes:  Negative for visual disturbance.   Respiratory:  Negative for chest tightness and shortness of breath.    Cardiovascular:  Negative for chest pain.   Gastrointestinal:  Negative for abdominal pain, constipation, diarrhea, nausea and vomiting.   Genitourinary:  Negative for difficulty urinating.   Musculoskeletal:  Positive for back pain, gait problem and myalgias. Negative for neck pain.   Skin:  Negative for rash.   Neurological:  Positive for weakness. Negative for dizziness, light-headedness, numbness and headaches.   Psychiatric/Behavioral:  Positive for sleep disturbance. Negative for hallucinations and suicidal ideas. The patient is not nervous/anxious.        Past Medical History:   Diagnosis Date    Chronic heart failure with preserved ejection fraction 2/9/2023    Chronic midline low back pain without sciatica 10/2/2017    Colon polyps     Coronary artery disease involving native coronary artery of native heart 3/5/2020    Coronary artery disease involving native coronary artery of native heart with angina pectoris 12/10/2020    Diabetes mellitus with neurological manifestations, uncontrolled 1/24/2017    Diabetic polyneuropathy associated with type 2 diabetes mellitus 1/24/2017    Diabetic polyneuropathy associated with type 2 diabetes mellitus     Essential hypertension 1/24/2017    Gastroesophageal reflux disease 1/24/2017    Hyperlipidemia 1/24/2017    Insomnia 1/24/2017     Long-term insulin use 1/24/2017    Longstanding persistent atrial fibrillation 12/30/2020    Lumbar spondylosis 11/13/2017    Nuclear sclerosis of both eyes 8/24/2017    Obesity     PAD (peripheral artery disease) 3/23/2022    Stage 3 chronic kidney disease 2/13/2019    Uncontrolled type 2 diabetes mellitus without complication, with long-term current use of insulin 1/24/2017       Past Surgical History:   Procedure Laterality Date    ARTHROSCOPIC REPAIR OF ROTATOR CUFF OF SHOULDER Right 7/5/2022    Procedure: REPAIR, ROTATOR CUFF, ARTHROSCOPIC;  Surgeon: Valerie Olivera MD;  Location: [facility] OR;  Service: Orthopedics;  Laterality: Right;  VARELA AND NEPHJENNIFER LEMUS [phone]/ TEXTED ALLAN ON 6/23/2022 @ 9:47AM. ALLAN RESPONDED ON 6/23/2022 @ 10:33AM-LO  JEAN PAUL BABIN [phone] MY BE HERE FOR CASE SPOKE TO HIM @ 9:59AM ON 7-1-2022  RN PREOP 6/28/2022    BACK SURGERY      2002    CATARACT EXTRACTION W/  INTRAOCULAR LENS IMPLANT Right 08/29/2017    Dr. Hong    CATARACT EXTRACTION W/  INTRAOCULAR LENS IMPLANT Left 02/24/2022    Dr. Hong    COLONOSCOPY N/A 2/21/2017    Procedure: COLONOSCOPY;  Surgeon: Robb Rosado MD;  Location: [facility] ENDO;  Service: Endoscopy;  Laterality: N/A;    COLONOSCOPY N/A 7/5/2023    Procedure: COLONOSCOPY;  Surgeon: Aston Varela MD;  Location: [facility] ENDO;  Service: Endoscopy;  Laterality: N/A;  Referral: JOVANNI SCANLON to hold Plavix 5 days and Eliquis 2 days per Dr [facility] Meds  Suprep   Inst portal   LW    CORONARY ANGIOGRAPHY INCLUDING BYPASS GRAFTS WITH CATHETERIZATION OF LEFT HEART N/A 11/11/2020    Procedure: ANGIOGRAM, CORONARY, INCLUDING BYPASS GRAFT, WITH LEFT HEART CATHETERIZATION;  Surgeon: Lane Cuellar MD;  Location: [facility] CATH LAB;  Service: Cardiology;  Laterality: N/A;  RN PRE OP 11-5-2020. --COVID NEGATIVE ON  11-. C A    CORONARY ARTERY BYPASS GRAFT      March 2016    EPIDURAL STEROID INJECTION Bilateral 11/14/2018     Procedure: Lumbar Medial Branch Blocks;  Surgeon: Eze Byrd Jr., MD;  Location: [facility] ENDO;  Service: Pain Management;  Laterality: Bilateral;  Bilateral Lumbar Medial Branch Blocks L2, L3, L4, L5    [id number]  [id number]    Arrive @ 1150; NO Sedation    EPIDURAL STEROID INJECTION Bilateral 2018    Procedure: Injection, Steroid, Epidural;  Surgeon: Eze Byrd Jr., MD;  Location: [facility] ENDO;  Service: Pain Management;  Laterality: Bilateral;  Bilateral Sacroiliac Joint Steroid Injections    [id number]    Arrive @ 0910    EPIDURAL STEROID INJECTION Bilateral 3/20/2019    Procedure: Injection, Steroid, Sacroiliiac Joint;  Surgeon: Eze Byrd Jr., MD;  Location: [facility] ENDO;  Service: Pain Management;  Laterality: Bilateral;  Bilateral Sacroiliac Joint Steroid Injections    [id number]    Arrive @ 1145; Trigger point injections also?    EPIDURAL STEROID INJECTION Right 2023    Procedure: Right L5 Transforaminal Epidural Steroid Injection;  Surgeon: Eze Byrd Jr., MD;  Location: [facility] ENDO;  Service: Pain Management;  Laterality: Right;  @0830 (given)  Eliquis last   Plavix last   Check BG    EPIDURAL STEROID INJECTION Right 10/11/2023    Procedure: Right L3 (infraneural) + S1 Transforaminal Epidural Steroid Injections;  Surgeon: Eze Byrd Jr., MD;  Location: [facility];  Service: Pain Management;  Laterality: Right;  @0745 (requests morning)  Eliquis 10/7 & Plavix 10/5  Check BG    ESOPHAGOGASTRODUODENOSCOPY N/A 2023    Procedure: EGD (ESOPHAGOGASTRODUODENOSCOPY);  Surgeon: Anita Oswald MD;  Location: [facility];  Service: Endoscopy;  Laterality: N/A;  Procedure Timin-4 weeks  Provider: Any GI provider  Location: No Preference  Additional Scheduling Information: Blood thinners  Prep Specifications:N/A   ref. by Dr. Light, instr. to portal, ,  meds-st  ok to hold Plavix 5 days and Eliquis 2 day    INTRAOCULAR PROSTHESES INSERTION Left 2022     Procedure: INSERTION, IOL PROSTHESIS;  Surgeon: Nickolas Hong MD;  Location: [facility] OR;  Service: Ophthalmology;  Laterality: Left;    PHACOEMULSIFICATION OF CATARACT Left 2/24/2022    Procedure: PHACOEMULSIFICATION, CATARACT;  Surgeon: Nickolas Hong MD;  Location: [facility] OR;  Service: Ophthalmology;  Laterality: Left;  RN Phone Pre Op 2-16-22.  Covid NEGATIVE  2-23-22.  Arrival 08:00 am.    TONSILLECTOMY         Social History     Socioeconomic History    Marital status:    Tobacco Use    Smoking status: Never     Passive exposure: Never    Smokeless tobacco: Never   Substance and Sexual Activity    Alcohol use: Yes     Comment: rare occassion    Drug use: No    Sexual activity: Yes     Partners: Female     Social Determinants of Health     Financial Resource Strain: Low Risk  (1/22/2024)    Overall Financial Resource Strain (CARDIA)     Difficulty of Paying Living Expenses: Not hard at all   Food Insecurity: No Food Insecurity (1/22/2024)    Hunger Vital Sign     Worried About Running Out of Food in the Last Year: Never true     Ran Out of Food in the Last Year: Never true   Transportation Needs: No Transportation Needs (1/22/2024)    PRAPARE - Transportation     Lack of Transportation (Medical): No     Lack of Transportation (Non-Medical): No   Physical Activity: Insufficiently Active (1/22/2024)    Exercise Vital Sign     Days of Exercise per Week: 3 days     Minutes of Exercise per Session: 30 min   Stress: Stress Concern Present (1/22/2024)    [employer] [employer] - Occupational Stress Questionnaire     Feeling of Stress : To some extent   Housing Stability: Low Risk  (1/22/2024)    Housing Stability Vital Sign     Unable to Pay for Housing in the Last Year: No     Number of Places Lived in the Last Year: 1     Unstable Housing in the Last Year: No       Review of patient's allergies indicates:   Allergen Reactions    Penicillins Other (See Comments)     Unknown  reaction, Had a reaction as a child    Shrimp Itching     Hand itching       Current Outpatient Medications on File Prior to Visit   Medication Sig Dispense Refill    albuterol (PROVENTIL/VENTOLIN HFA) 90 mcg/actuation inhaler Inhale 2 puffs into the lungs every 4 (four) hours as needed for Shortness of Breath. Rescue 17 g 3    ascorbic acid, vitamin C, (VITAMIN C) 100 MG tablet Take 100 mg by mouth daily as needed.      atorvastatin (LIPITOR) 80 MG tablet TAKE 1 TABLET EVERY EVENING 90 tablet 3    azelastine (ASTELIN) 137 mcg (0.1 %) nasal spray 1 spray (137 mcg total) by Nasal route 2 (two) times daily. 30 mL 3    b complex vitamins tablet Take 1 tablet by mouth once daily.      BD ALCOHOL SWABS PadM       blood sugar diagnostic Strp Check blood glucose 2 times a day. True metrix. E11.65 200 strip 3    celecoxib (CELEBREX) 100 MG capsule Take 1 capsule (100 mg total) by mouth once daily. 30 capsule 6    clopidogreL (PLAVIX) 75 mg tablet Take 1 tablet (75 mg total) by mouth once daily. 90 tablet 3    coenzyme Q10 100 mg capsule Take 100 mg by mouth once daily.      dapagliflozin (FARXIGA) 5 mg Tab tablet Take 1 tablet (5 mg total) by mouth once daily. 30 tablet 3    dexamethasone sodium phosp/PF (DEXAMETHASONE SODIUM PHOS, PF,) 10 mg/mL Soln       ELIQUIS 5 mg Tab TAKE 1 TABLET TWICE DAILY 180 tablet 3    ergocalciferol (ERGOCALCIFEROL) 50,000 unit Cap TAKE 1 CAPSULE BY MOUTH WEEKLY 12 capsule 0    fenofibrate 160 MG Tab TAKE 1 TABLET EVERY MORNING 90 tablet 12    fluticasone propionate (FLONASE) 50 mcg/actuation nasal spray 2 sprays (100 mcg total) by Each Nostril route 2 (two) times daily. 18.2 mL 3    furosemide (LASIX) 20 MG tablet TAKE 1 TABLET TWICE DAILY 180 tablet 3    gabapentin (NEURONTIN) 100 MG capsule TAKE 2 CAPSULES EVERY MORNING AND TAKE 3 CAPSULES EVERY DAY WITH DINNER 450 capsule 12    glimepiride (AMARYL) 1 MG tablet TAKE 1 TABLET BEFORE BREAKFAST 90 tablet 3    hydrALAZINE (APRESOLINE) 50 MG  "tablet Take 1 tablet (50 mg total) by mouth 2 (two) times a day. 180 tablet 3    insulin degludec (TRESIBA FLEXTOUCH U-100) 100 unit/mL (3 mL) insulin pen Inject 20 Units into the skin once daily. 30 mL 4    isosorbide mononitrate (IMDUR) 120 MG 24 hr tablet Take 1 tablet (120 mg total) by mouth once daily. 90 tablet 3    ketoconazole (NIZORAL) 2 % cream Apply topically once daily. 60 g 6    lancets Misc Check blood glucose 2x/day. True metrix. E11.65 200 each 3    magnesium 250 mg Tab Take 1 tablet by mouth once daily.       metFORMIN (GLUCOPHAGE-XR) 500 MG ER 24hr tablet Take 1 tablet with breakfast and 2 tablets with supper. 270 tablet 2    omega-3 acid ethyl esters (LOVAZA) 1 gram capsule Take 2 capsules (2 g total) by mouth 2 (two) times daily. 360 capsule 12    OMNIPAQUE 300 300 mg iodine/mL injection       pantoprazole (PROTONIX) 40 MG tablet TAKE 1 TABLET EVERY DAY 90 tablet 3    pen needle, diabetic 32 gauge x 1/4" Ndle Use daily 200 each 3    semaglutide (OZEMPIC) 2 mg/dose (8 mg/3 mL) PnIj Inject 2 mg into the skin every 7 days. 4 each 3    triazolam (HALCION) 0.25 MG Tab TAKE 1 TABLET BY MOUTH EVERY NIGHT AT BEDTIME AS NEEDED 90 tablet 5    triazolam (HALCION) 0.25 MG Tab TAKE 1 TABLET BY MOUTH EVERY NIGHT AT BEDTIME AS NEEDED 30 tablet 1    TRUEPLUS LANCETS 33 gauge Misc       blood-glucose meter kit Test glucose 2-3x/day. True metrix 1 each 0    nitroGLYCERIN (NITROSTAT) 0.4 MG SL tablet Place 1 tablet (0.4 mg total) under the tongue every 5 (five) minutes as needed for Chest pain. 25 tablet 11     Current Facility-Administered Medications on File Prior to Visit   Medication Dose Route Frequency Provider Last Rate Last Admin    cyclopentolate 1% ophthalmic solution 1 drop  1 drop Left Eye On Call Procedure Nickolas Hong MD   1 drop at 02/24/22 0901    ofloxacin 0.3 % ophthalmic solution 1 drop  1 drop Left Eye On Call Procedure Nickolas Hong MD   2 drop at 02/24/22 1017    sodium " chloride 0.9% flush 10 mL  10 mL Intravenous PRN Nickolas Hong MD        triamcinolone acetonide injection 40 mg  40 mg Intra-articular  Valerie Olivera MD   40 mg at 02/09/23 1030       Objective:      BP (!) 165/79 (BP Location: Left arm, Patient Position: Sitting, BP Method: Medium (Automatic))   Pulse 66   SpO2 97%     Exam:  GEN:  Well developed, well nourished.  No acute distress.  Normal pain behavior.  HEENT:  No trauma.  Mucous membranes moist.  Nares patent bilaterally.  PSYCH: Normal affect. Thought content appropriate.  CHEST:  Breathing symmetric.  No audible wheezing.  ABD: Soft, non-distended.  SKIN:  Warm, pink, dry.  No rash on exposed areas.    EXT:  No cyanosis, clubbing, or edema.  No color change or changes in nail or hair growth.  NEURO/MUSCULOSKELETAL:  Fully alert, oriented, and appropriate. Speech normal ivania. No cranial nerve deficits.   Gait:  Antalgic.  No trendelenburg sign bilaterally.   Motor Strength:  5/5 motor strength throughout lower extremities.   Sensory:  No sensory deficit in the lower extremities.   L-Spine:  Limited ROM with pain on extension.  Negative pain with axial/facet loading bilaterally.  Negative SLR bilaterally.    Diffusely TTP over bilateral upper and lower lumbar paraspinals        Imaging:    Narrative & Impression    EXAMINATION:  MRI LUMBAR SPINE WITHOUT CONTRAST     CLINICAL HISTORY:  Lumbar radiculopathy, symptoms persist with conservative treatment; Spondylosis without myelopathy or radiculopathy, lumbar region     TECHNIQUE:  Multiplanar, multisequence MR images were acquired from the thoracolumbar junction to the sacrum without the administration of contrast.     COMPARISON:  02/11/2019.     FINDINGS:  There is susceptibility weighted artifact within the midline lower lumbar spine, suggestive of prior instrumentation.     The lumbar alignment is within normal limits.  There are scattered Schmorl's nodes.  The vertebral body heights are  maintained.  The bone marrow signal is within normal limits.     The conus terminates at the level of T12.  The cauda equina nerve roots are within normal limits.     There is multilevel disc desiccation.  Evaluation of the individual disc levels reveals the following:     L1-L2, there is a disc bulge along with facet hypertrophy and ligamentum flavum hypertrophy.  The spinal canal and neural foramina are unremarkable.     L2-L3, there is a disc bulge along with facet hypertrophy and ligamentum flavum hypertrophy.  The spinal canal and neural foramina are unremarkable There is small amount of fluid within the facet joints.     L3-L4, there is diffuse disc bulge along with facet hypertrophy and ligamentum flavum.  The spinal canal is within normal limits.  There is mild spinal canal narrowing.     L4-L5, there is diffuse disc bulge along with facet hypertrophy and ligamentum flavum hypertrophy.  There is superimposed central disc protrusion.  There is marked narrowing of the lateral recesses.  There is mild to moderate narrowing the spinal canal.  There is moderate bilateral neural foraminal narrowing.     L5-S1, there is diffuse disc bulge along with facet hypertrophy and ligamentum flavum.  The spinal canal is within normal limits.  There is moderate neural foraminal narrowing.     The paraspinal soft tissues are unremarkable.     Impression:     Changes of prior instrumentation in the lower lumbar spine at the L4-L5 level.  Unchanged appearance of soft tissue at the prior disc site at the L4-L5 level.     Mild to moderate narrowing of the spinal canal at the L4-L5 level.     Mild-to-moderate neural foraminal narrowing the lower lumbar spine as above.        Electronically signed by: Kevin Erickson MD  Date:                                            07/18/2023  Time:                                           09:04       Narrative & Impression    EXAMINATION:  XR CERVICAL SPINE AP LAT WITH FLEX EXTEN     CLINICAL  HISTORY:  Other cervical disc degeneration, unspecified cervical region     TECHNIQUE:  Three views of the cervical spine plus flexion and extension views were performed.     COMPARISON:  None     FINDINGS:  Cervical spine is normal in alignment, vertebral body heights are maintained.  No displaced fracture or subluxation.  No suspicious bone lesion.     No instability between the flexion and extension images.     Mild and moderate multilevel degenerative disc disease and uncovertebral DJD.  Anterior osteophytosis at levels C2-3, C4-5, and C5-6.  Mild multilevel facet DJD.     Unremarkable soft tissues.     Impression:     No acute abnormality.  No instability.     Mild and moderate multilevel DJD.        Electronically signed by: Marco Vásquez MD  Date:                                            08/18/2021  Time:                                           08:35           Narrative     EXAMINATION:  MRI LUMBAR SPINE WITHOUT CONTRAST    CLINICAL HISTORY:  lumbar ddd; Other intervertebral disc degeneration, lumbar region    TECHNIQUE:  Multiplanar, multisequence MR images were acquired from the thoracolumbar junction to the sacrum without the administration of contrast.    COMPARISON:  08/23/2017    FINDINGS:  There is no evidence of fracture or marrow replacement process.  There is disc desiccation at all lumbar levels with disc space narrowing at multiple levels but most significant at L4-5.  Sagittal alignment is maintained.  Conus terminates at T12-L1.  Visualized retroperitoneal structures demonstrate no significant abnormalities.    T12-L1, L1-L2: Mild diffuse disc bulge with no significant central canal stenosis or neural foraminal narrowing.    L2-L3: Mild diffuse disc bulge with moderate facet arthropathy.  No significant central canal stenosis or neural foraminal narrowing.    L3-4: Mild diffuse disc bulge extending into both neural foramen with moderate facet arthropathy causing moderate right and mild left  neural foraminal narrowing.  No significant central canal stenosis.    L4-5: Hemilaminectomy defect on the right.  There is a diffuse disc bulge extending into both neural foramen with a superimposed central extrusion with an annular fissure extending slightly below the level of the disc plane.  This causes mass effect on the lateral recess and may impinge on the descending nerve roots although the canal is decompressed posteriorly by the laminectomy defect with no significant central canal stenosis.  There is severe facet arthropathy contributing to severe bilateral neural foraminal narrowing.    L5-S1: There is severe facet arthropathy.  There is a diffuse disc bulge with no significant central canal stenosis.  There is severe bilateral neural foraminal narrowing..   Impression       Postoperative changes at L4 on the right with decompression of the central canal.  There is lumbar spondylosis most significant at L4-5 and L5-S1 where there is severe bilateral neural foraminal narrowing.  Specific details at each level are discussed above.      Electronically signed by: Quinton Goins MD  Date: 02/12/2019  Time: 10:10         Assessment:       Encounter Diagnoses   Name Primary?    Lumbar spondylosis Yes    DDD (degenerative disc disease), lumbar     Lumbar radiculopathy      Plan:       Driss was seen today for low-back pain and leg pain.    Diagnoses and all orders for this visit:    Lumbar spondylosis    DDD (degenerative disc disease), lumbar    Lumbar radiculopathy      Driss Burttima Akbar is a 73 y.o. male with worsening chronic midline/bilateral low back pain.  No overt neurologic deficits noted on examination.  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  This is chronic.  Mild improvement following right L5 TF ANNIE.  Significant improvement with right L3 infraneural, S1 TF ANNIE.  Today with more recent exacerbation of midline low back and b/l lower extremity pain.  I suspect that this might be related  to acute intervertebral disc herniation and associated radiculitis/radiculopathy.    Prior records reviewed.  Pertinent imaging studies reviewed by me. Imaging results were discussed with patient.  Schedule for caudal ANNIE.  Patient can continue with current medications since they are providing benefit, using them appropriately, and without adverse effects.  Return to clinic after procedure to discuss results. May consider updating lumbar MRI if pain persists or worsens.         This note was created by combination of typed  and M-Modal dictation.  Transcription and phonetic errors may be present.  If there are any questions, please contact me.

## 2024-09-19 ENCOUNTER — TELEPHONE (OUTPATIENT)
Dept: PAIN MEDICINE | Facility: CLINIC | Age: 74
End: 2024-09-19
Payer: MEDICARE

## 2024-09-19 ENCOUNTER — E-CONSULT (OUTPATIENT)
Dept: CARDIOLOGY | Facility: HOSPITAL | Age: 74
End: 2024-09-19
Payer: MEDICARE

## 2024-09-19 DIAGNOSIS — M51.36 DDD (DEGENERATIVE DISC DISEASE), LUMBAR: ICD-10-CM

## 2024-09-19 DIAGNOSIS — Z01.810 PREOP CARDIOVASCULAR EXAM: Primary | ICD-10-CM

## 2024-09-19 DIAGNOSIS — M54.16 LUMBAR RADICULOPATHY: Primary | ICD-10-CM

## 2024-09-19 NOTE — TELEPHONE ENCOUNTER
Mr. Naqvi would like to schedule the Caudal ANNIE on 9/25/2024- provider updated.     Reviewed pre-procedure instructions with him, as well as provided arrival time of 7:15a for 9/25/2024.  All questions answered- verbalized understanding.    Clearance request to hold Plavix x5 days and Eliquis x3 days prior sent to Dr. Purdy via e-consult.

## 2024-09-19 NOTE — CONSULTS
[facility] CARDIOLOGY  Response for E-Consult     Patient Name: Driss Hernandez Jr.  MRN: [id number]  Primary Care Provider: Jono Willoughby MD   Requesting Provider: Eze Byrd Jr*  E-Consult to Cardiology  Consult performed by: Eze Purdy MD  Consult ordered by: Eze Byrd Jr., MD  Reason for consult: Perioperative antiplatelet and anticoagulant management.          Recommendation:  It is acceptable hold the patient's Plavix for 5 days and Eliquis for 3 days prior to the planned ANNIE procedure and resume them as soon as possible thereafter.    Contingency that warrants a repeat eConsult or referral:  As needed    Total time of Consultation: 5 minute    I did not speak to the requesting provider verbally about this.     *This eConsult is based on the clinical data available to me and is furnished without benefit of a physical examination. The eConsult will need to be interpreted in light of any clinical issues or changes in patient status not available to me at the time of filing this eConsults. Significant changes in patient condition or level of acuity should result in immediate formal consultation and reevaluation. Please alert me if you have further questions.    Thank you for this eConsult referral.     Eze Purdy MD  [facility] CARDIOLOGY

## 2024-09-20 ENCOUNTER — TELEPHONE (OUTPATIENT)
Dept: ENDOCRINOLOGY | Facility: CLINIC | Age: 74
End: 2024-09-20
Payer: MEDICARE

## 2024-09-20 NOTE — TELEPHONE ENCOUNTER
----- Message from Karena Carter MA sent at 9/20/2024  8:55 AM CDT -----  Type: Patient Call Back    Who called: Spouse - Misa Hernandez    What is the request in detail:is asking for a call regarding him being placed back in a program for him to receive his medication for free..       dapagliflozin (FARXIGA) 5 mg Tab tablet    Can the clinic reply by [facility]?NO    Would the patient rather a call back or a response via [facility]? Yes, call     Best call back number: [phone]

## 2024-09-20 NOTE — TELEPHONE ENCOUNTER
----- Message from Ruby Keith sent at 9/20/2024  2:17 PM CDT -----  .Type:  Patient Returning Call    Who Called: Ada - wife     Who Left Message for Patient: Erick    Does the patient know what this is regarding?: Yes     Would the patient rather a call back or a response via [facility]? Call Back     Best Call Back Number:[phone]    Additional Information:

## 2024-09-23 ENCOUNTER — TELEPHONE (OUTPATIENT)
Dept: PAIN MEDICINE | Facility: CLINIC | Age: 74
End: 2024-09-23
Payer: MEDICARE

## 2024-09-23 NOTE — TELEPHONE ENCOUNTER
Spoke with pt to change his arrival time for the procedure scheduled 9/25/2024. He confirmed his new arrival time of 11:00am.

## 2024-09-24 ENCOUNTER — PATIENT MESSAGE (OUTPATIENT)
Dept: PAIN MEDICINE | Facility: CLINIC | Age: 74
End: 2024-09-24
Payer: MEDICARE

## 2024-09-24 NOTE — DISCHARGE INSTRUCTIONS
Home Care Instructions Pain Management:    1. DIET:   You may resume your normal diet today.   2. BATHING:   You may shower with luke warm water.  3. DRESSING:   You may remove your bandage today.   4. ACTIVITY LEVEL:   You may resume your normal activities 24 hrs after your procedure.  5. MEDICATIONS:   You may resume your normal medications today.   6. SPECIAL INSTRUCTIONS:   No heat to the injection site for 24 hrs including, bath or shower, heating pad, moist heat, or hot tubs.    Use ice pack to injection site for any pain or discomfort.  Apply ice packs for 20 minute intervals as needed.   If you have received any sedatives by mouth today you may not drive for 12 hours.    If you have received any sedation through your IV, you may not drive for 24 hrs.     PLEASE CALL YOUR DOCTOR IF:  1. Redness or swelling around the injection site.  2. Fever of 101 degrees  3. Drainage (pus) from the injection site.  4. For any continuous bleeding (some dried blood over the incision is normal.)    FOR EMERGENCIES:   If any unusual problems or difficulties occur during clinic hours, call [phone] or [phone].       Adult Procedural Sedation Instructions    Recovery After Procedural Sedation (Adult)  You have been given medicine by vein to make you sleep during your surgery. This may have included both a pain medicine and sleeping medicine. Most of the effects have worn off. But you may still have some drowsiness for the next 6 to 8 hours.  Home care  Follow these guidelines when you get home:  For the next 8 hours, you should be watched by a responsible adult. This person should make sure your condition is not getting worse.  Don't drink any alcohol for the next 24 hours.  Don't drive, operate dangerous machinery, or make important business or personal decisions during the next 24 hours.  Note: Your healthcare provider may tell you not to take any medicine by mouth for pain or sleep in the next 4 hours. These medicines may  react with the medicines you were given in the hospital. This could cause a much stronger response than usual.  Follow-up care  Follow up with your healthcare provider if you are not alert and back to your usual level of activity within 12 hours.  When to seek medical advice  Call your healthcare provider right away if any of these occur:  Drowsiness gets worse  Weakness or dizziness gets worse  Repeated vomiting  You can't be awakened   Date Last Reviewed: 10/18/2016  © 1915-4505 [employer]. [address] [place] [address], [place] [address]. All rights reserved. This information is not intended as a substitute for professional medical care. Always follow your healthcare professional's instructions.

## 2024-09-25 ENCOUNTER — HOSPITAL ENCOUNTER (OUTPATIENT)
Facility: HOSPITAL | Age: 74
Discharge: HOME | End: 2024-09-25
Attending: PAIN MEDICINE | Admitting: PAIN MEDICINE
Payer: MEDICARE

## 2024-09-25 VITALS
RESPIRATION RATE: 16 BRPM | OXYGEN SATURATION: 96 % | DIASTOLIC BLOOD PRESSURE: 74 MMHG | TEMPERATURE: 98 F | HEART RATE: 37 BPM | SYSTOLIC BLOOD PRESSURE: 139 MMHG

## 2024-09-25 DIAGNOSIS — M54.16 LUMBAR RADICULOPATHY: Primary | ICD-10-CM

## 2024-09-25 LAB — POCT GLUCOSE: 73 MG/DL (ref 70–110)

## 2024-09-25 PROCEDURE — 25000003 PHARM REV CODE 250: Mod: HCNC | Performed by: PAIN MEDICINE

## 2024-09-25 PROCEDURE — 25500020 PHARM REV CODE 255: Mod: HCNC | Performed by: PAIN MEDICINE

## 2024-09-25 PROCEDURE — 63600175 PHARM REV CODE 636 W HCPCS: Mod: HCNC | Performed by: PAIN MEDICINE

## 2024-09-25 PROCEDURE — 62323 NJX INTERLAMINAR LMBR/SAC: CPT | Mod: HCNC | Performed by: PAIN MEDICINE

## 2024-09-25 PROCEDURE — 62323 NJX INTERLAMINAR LMBR/SAC: CPT | Mod: HCNC,,, | Performed by: PAIN MEDICINE

## 2024-09-25 RX ORDER — DEXAMETHASONE SODIUM PHOSPHATE 10 MG/ML
10 INJECTION INTRAMUSCULAR; INTRAVENOUS ONCE
Status: COMPLETED | OUTPATIENT
Start: 2024-09-25 | End: 2024-09-25

## 2024-09-25 RX ORDER — DEXAMETHASONE SODIUM PHOSPHATE 10 MG/ML
INJECTION INTRAMUSCULAR; INTRAVENOUS
Status: DISCONTINUED
Start: 2024-09-25 | End: 2024-09-25 | Stop reason: HOSPADM

## 2024-09-25 RX ORDER — FENTANYL CITRATE 50 UG/ML
INJECTION, SOLUTION INTRAMUSCULAR; INTRAVENOUS
Status: DISCONTINUED
Start: 2024-09-25 | End: 2024-09-25 | Stop reason: HOSPADM

## 2024-09-25 RX ORDER — LIDOCAINE HYDROCHLORIDE 10 MG/ML
1 INJECTION, SOLUTION EPIDURAL; INFILTRATION; INTRACAUDAL; PERINEURAL ONCE
Status: COMPLETED | OUTPATIENT
Start: 2024-09-25 | End: 2024-09-25

## 2024-09-25 RX ORDER — SODIUM CHLORIDE 9 MG/ML
INJECTION, SOLUTION INTRAVENOUS CONTINUOUS
Status: DISCONTINUED | OUTPATIENT
Start: 2024-09-25 | End: 2024-09-25 | Stop reason: HOSPADM

## 2024-09-25 RX ORDER — MIDAZOLAM HYDROCHLORIDE 2 MG/2ML
5 INJECTION, SOLUTION INTRAMUSCULAR; INTRAVENOUS
Status: DISCONTINUED | OUTPATIENT
Start: 2024-09-25 | End: 2024-09-25 | Stop reason: HOSPADM

## 2024-09-25 RX ORDER — FENTANYL CITRATE 50 UG/ML
100 INJECTION, SOLUTION INTRAMUSCULAR; INTRAVENOUS
Status: DISCONTINUED | OUTPATIENT
Start: 2024-09-25 | End: 2024-09-25 | Stop reason: HOSPADM

## 2024-09-25 RX ORDER — LIDOCAINE HYDROCHLORIDE 10 MG/ML
20 INJECTION, SOLUTION INFILTRATION; PERINEURAL ONCE
Status: COMPLETED | OUTPATIENT
Start: 2024-09-25 | End: 2024-09-25

## 2024-09-25 RX ORDER — MIDAZOLAM HYDROCHLORIDE 2 MG/2ML
INJECTION, SOLUTION INTRAMUSCULAR; INTRAVENOUS
Status: DISCONTINUED
Start: 2024-09-25 | End: 2024-09-25 | Stop reason: HOSPADM

## 2024-09-25 RX ORDER — LIDOCAINE HYDROCHLORIDE 10 MG/ML
INJECTION, SOLUTION EPIDURAL; INFILTRATION; INTRACAUDAL; PERINEURAL
Status: DISCONTINUED
Start: 2024-09-25 | End: 2024-09-25 | Stop reason: HOSPADM

## 2024-09-25 RX ORDER — LIDOCAINE HYDROCHLORIDE 10 MG/ML
INJECTION, SOLUTION INFILTRATION; PERINEURAL
Status: DISCONTINUED
Start: 2024-09-25 | End: 2024-09-25 | Stop reason: HOSPADM

## 2024-09-25 RX ADMIN — SODIUM CHLORIDE: 9 INJECTION, SOLUTION INTRAVENOUS at 11:09

## 2024-09-25 NOTE — DISCHARGE SUMMARY
[facility] - Pain Management  Discharge Note  Short Stay    Procedure(s) (LRB):  Caudal Epidural Steroid Injection (N/A)      OUTCOME: Patient tolerated treatment/procedure well without complication and is now ready for discharge.    DISPOSITION: Home or Self Care    FINAL DIAGNOSIS:  <principal problem not specified>    FOLLOWUP: In clinic    DISCHARGE INSTRUCTIONS:  No discharge procedures on file.       Discharge Diagnosis:Lumbar radiculopathy [M54.16]  DDD (degenerative disc disease), lumbar [M51.36]  Condition on Discharge: Stable.  Diet on Discharge: Same as before.  Activity: as per instruction sheet.  Discharge to: Home with a responsible adult.  Follow up: as per Discharge instructions

## 2024-09-25 NOTE — OP NOTE
Caudal Epidural Steroid Injection under Fluoroscopy    Time-out taken to identify patient and procedure side prior to starting the procedure.   I attest that I have reviewed the patient's home medications prior to the procedure and no contraindication have been identified. I re-evaluated the patient after the patient was positioned for the procedure in the procedure room immediately before the procedural time-out. The vital signs are current and represent the current state of the patient which has not significantly changed since the preprocedure assessment        Date of Service: 09/25/2024    PCP: Jono Willoughby MD    Referring Physician:                                                PROCEDURE:  Caudal epidural steroid injection under fluoroscopy with insertion of flexible catheter    REASON FOR PROCEDURE:  M51.16 Intervertebral disc disorders with radiculopathy, lumbar region    PHYSICIAN: Eze Byrd MD    ASSISTANTS: None    MEDICATIONS INJECTED:  Preservative-free dexamethasone 10mg, sterile preservative free normal saline 2-4ml and preservative free sterile Lidocaine 1% 5ml.    LOCAL ANESTHETIC GIVEN:  Xylocaine 1% 2ml.     SEDATION MEDICATIONS: Versed 1 mg and fentanyl 50 mcg IV.  Conscious sedation provided by MD and monitored by RN.   (See nurse documentation and case log for sedation time)      ESTIMATED BLOOD LOSS:  None.    COMPLICATIONS:  None.    TECHNIQUE:   Laying in the prone position, the patient was prepped and draped in the usual sterile fashion using ChloraPrep and fenestrated drape.  Appropriate anatomic landmarks were determined including the superior LS-spine and sacral hiatus.  Local anesthetic was given by raising a wheal and going down to the periosteum.  A 3.5in 16-gauge spinal needle was introduced through the sacral hiatus.  Omnipaque was injected to confirm placement in the appropriate area and that there was no vascular runoff.  The flexible catheter threaded through to  the desired levels. Omnipaque was reinjected to confirm placement in the appropriate area. The medication was then injected slowly.  The patient tolerated the procedure well.    PAIN BEFORE THE PROCEDURE:  3/10.    PAIN AFTER THE PROCEDURE: 3/10.    The patient was monitored after the procedure.  Patient was given post procedure and discharge instructions to follow at home.  We will see the patient back in two weeks or the patient may call to inform of status. The patient was discharged in a stable condition.

## 2024-09-27 RX ORDER — HYDRALAZINE HYDROCHLORIDE 50 MG/1
50 TABLET, FILM COATED ORAL 2 TIMES DAILY
Qty: 180 TABLET | Refills: 3 | Status: SHIPPED | OUTPATIENT
Start: 2024-09-27

## 2024-10-08 ENCOUNTER — OFFICE VISIT (OUTPATIENT)
Dept: OPTOMETRY | Facility: CLINIC | Age: 74
End: 2024-10-08
Payer: MEDICARE

## 2024-10-08 DIAGNOSIS — E11.9 TYPE 2 DIABETES MELLITUS WITHOUT COMPLICATION, WITHOUT LONG-TERM CURRENT USE OF INSULIN: Primary | ICD-10-CM

## 2024-10-08 DIAGNOSIS — Z96.1 PSEUDOPHAKIA: ICD-10-CM

## 2024-10-08 PROBLEM — H25.12 NUCLEAR SCLEROSIS, LEFT: Status: RESOLVED | Noted: 2017-08-24 | Resolved: 2024-10-08

## 2024-10-08 PROBLEM — H25.10 SENILE NUCLEAR SCLEROSIS: Status: RESOLVED | Noted: 2017-08-29 | Resolved: 2024-10-08

## 2024-10-08 PROBLEM — H25.043 POSTERIOR SUBCAPSULAR AGE-RELATED CATARACT OF BOTH EYES: Status: RESOLVED | Noted: 2017-08-24 | Resolved: 2024-10-08

## 2024-10-08 PROBLEM — H25.12 NUCLEAR SCLEROTIC CATARACT OF LEFT EYE: Status: RESOLVED | Noted: 2022-02-24 | Resolved: 2024-10-08

## 2024-10-08 PROCEDURE — 1126F AMNT PAIN NOTED NONE PRSNT: CPT | Mod: HCNC,CPTII,S$GLB, | Performed by: OPTOMETRIST

## 2024-10-08 PROCEDURE — 1101F PT FALLS ASSESS-DOCD LE1/YR: CPT | Mod: HCNC,CPTII,S$GLB, | Performed by: OPTOMETRIST

## 2024-10-08 PROCEDURE — 3061F NEG MICROALBUMINURIA REV: CPT | Mod: HCNC,CPTII,S$GLB, | Performed by: OPTOMETRIST

## 2024-10-08 PROCEDURE — 3288F FALL RISK ASSESSMENT DOCD: CPT | Mod: HCNC,CPTII,S$GLB, | Performed by: OPTOMETRIST

## 2024-10-08 PROCEDURE — 99999 PR PBB SHADOW E&M-EST. PATIENT-LVL III: CPT | Mod: PBBFAC,HCNC,, | Performed by: OPTOMETRIST

## 2024-10-08 PROCEDURE — 3066F NEPHROPATHY DOC TX: CPT | Mod: HCNC,CPTII,S$GLB, | Performed by: OPTOMETRIST

## 2024-10-08 PROCEDURE — 2023F DILAT RTA XM W/O RTNOPTHY: CPT | Mod: HCNC,CPTII,S$GLB, | Performed by: OPTOMETRIST

## 2024-10-08 PROCEDURE — 92004 COMPRE OPH EXAM NEW PT 1/>: CPT | Mod: HCNC,S$GLB,, | Performed by: OPTOMETRIST

## 2024-10-08 PROCEDURE — 3044F HG A1C LEVEL LT 7.0%: CPT | Mod: HCNC,CPTII,S$GLB, | Performed by: OPTOMETRIST

## 2024-10-08 PROCEDURE — 1159F MED LIST DOCD IN RCRD: CPT | Mod: HCNC,CPTII,S$GLB, | Performed by: OPTOMETRIST

## 2024-10-08 PROCEDURE — 1160F RVW MEDS BY RX/DR IN RCRD: CPT | Mod: HCNC,CPTII,S$GLB, | Performed by: OPTOMETRIST

## 2024-10-08 NOTE — PROGRESS NOTES
Subjective:       Patient ID: Driss Hernandez Jr. is a 73 y.o. male      Chief Complaint   Patient presents with    Concerns About Ocular Health    Diabetic Eye Exam     History of Present Illness   Dls: 4/21/23 Dr. Hong    72 y/o male presents today for diabetic eye exam.   Pt c/o blurry vision at near ou.   Pt does not wear any glasses.     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 Type 2 diabetes mellitus without complication, without long-term current use of insulin (Primary)  No diabetic retinopathy. Discussed with pt the effects of diabetes on vision, importance of good blood sugar control, compliance with meds, and follow up care with PCP. Return in 1 year for dilated eye exam, sooner PRN.    2. Pseudophakia  Well-centered. Clear.   Readers PRN    Follow up in about 1 year (around 10/8/2025) for Diabetic Eye Exam.

## 2024-10-10 ENCOUNTER — OFFICE VISIT (OUTPATIENT)
Dept: PAIN MEDICINE | Facility: CLINIC | Age: 74
End: 2024-10-10
Payer: MEDICARE

## 2024-10-10 VITALS — OXYGEN SATURATION: 98 % | HEART RATE: 43 BPM | DIASTOLIC BLOOD PRESSURE: 54 MMHG | SYSTOLIC BLOOD PRESSURE: 128 MMHG

## 2024-10-10 DIAGNOSIS — M54.16 LUMBAR RADICULOPATHY, CHRONIC: ICD-10-CM

## 2024-10-10 DIAGNOSIS — M96.1 POSTLAMINECTOMY SYNDROME OF LUMBAR REGION: ICD-10-CM

## 2024-10-10 DIAGNOSIS — M51.362 DEGENERATION OF INTERVERTEBRAL DISC OF LUMBAR REGION WITH DISCOGENIC BACK PAIN AND LOWER EXTREMITY PAIN: Primary | ICD-10-CM

## 2024-10-10 DIAGNOSIS — M47.816 LUMBAR SPONDYLOSIS: ICD-10-CM

## 2024-10-10 DIAGNOSIS — M54.16 LUMBAR RADICULOPATHY: ICD-10-CM

## 2024-10-10 PROCEDURE — 99999 PR PBB SHADOW E&M-EST. PATIENT-LVL V: CPT | Mod: PBBFAC,HCNC,,

## 2024-10-10 NOTE — PROGRESS NOTES
Subjective:     Patient ID: Driss Hernandez Jr. is a 73 y.o. male    Chief Complaint: Follow-up      Referred by: No ref. provider found      HPI:    Interval History PA (10/10/2024):  Patient returns to clinic for follow up of bilateral lower back and extremity pain.  Patient is s/p caudal epidural steroid injection done on 09/25/2024 with initial good relief.  Patient reporting about 75% relief following the procedure.  However over the past few days his pain has been returning/worsening.  Denies significant changes in the quality or location of his pain.  States pain primarily located in his midline lower lumbar spine and we will extend into his bilateral lower extremities, worse on the right.  Notes his left lower extremity pain has significant improvement with the recent injection.  However persistent pain mainly along his right distal extremity from his knee to his ankle.  Denies any associated numbness, tingling, weakness, bowel or bladder dysfunction.  His pain is worse in the morning when getting out of bed or with activity.  Also pain lying supine at night causing difficulty sleeping.  Notes increased pain when bending or leaning forward.  Specifically having pain when trying to put on socks.    Interval History (9/18/24):  He returns today for follow up.  He reports that that he has been having worsening low back and lower extremity pain. This pain is different in quality and location from previous pain.  It is located in the midline lower lumbar region and will radiate to the b/l lower extremities from the knees to the feet/ankles. He denies any associated numbness/tingling, weakness or b/b dysfunction. Pain is mostly present in the mornings when getting out of bed or when initiating activity after rest. States that he has minimal pain when standing/walking. Also continues to have some right lower extremity radicular pain, but this is improved since last ANNIE.     Interval History PA  (11/01/2023):  Patient returns to clinic for follow up.  Patient is s/p right infraneural L3, S1 transforaminal epidural steroid injection done on 10/11/2023 with 80% relief of right-sided lower back and extremity pain.  Notes improvement started approximately 1 week following the procedure.  Notes pain in his right lower extremity has been overall manageable.  Denies any numbness, tingling, weakness, bowel or bladder dysfunction.  Patient does note a recent fall where he landed on his knees and feels he strained his lower back.  Notes diffuse increased pain across his lumbar paraspinal region.  Denies any radiation of pain.  Also with increased pain in his bilateral knees, abrasions and bruising noted.  Notes the incident occurred approximately 1 week ago and symptoms have been improving since.  Patient has been taking over-the-counter pain medication with benefit.    Interval History PA (09/21/2023):  Patient returns to clinic for follow up.  Patient notes some worsening of his chronic right-sided lower back and extremity pain.  Notes previous right L5 TF ANNIE done on August did provide some initial relief although short-lived.  States pain was manageable for a few weeks however has since been worsening.  Continues to endorse pain located in his right lumbar paraspinal region.  Majority of his pain is located from his right lateral hip radiating into his right anterior thigh, down to his knee.  Denies radiation distal to this point.  Denies any numbness, tingling, weakness, bowel bladder dysfunction.  Notes difficulty sleeping due to increased pain, worsened when lying supine.  Patient interested in proceeding with the previously discussed procedure.  States that he is not overtly interested in exploring surgical options at this time.    Interval History PA (08/17/2023):  Patient returns to clinic for follow up.  Patient is s/p right L5 transforaminal epidural steroid injection done on 08/02/2023 reporting some  initial relief with the 1st few days following the procedure.  Overall continued 30% relief.  Denies any changes in the quality or location of his pain.  States that he has been having minimal back pain, only notices a pressure sensation in his lower lumbar spine.  The majority of his pain is located between his right lateral hip into his anterior thigh stopping at the knee.  States his pain is intermittent, typically only present when getting out of bed, or standing up from a chair.  Notes minimal to no pain with activity or rest.  States symptoms still bothersome but overall tolerable at this time.  Denies any numbness, tingling, weakness, bowel or bladder dysfunction.    Interval History (7/18/23):  He returns today for follow up and MRI review.  He reports that his pain is unchanged in quality and location since last encounter.  He denies any new or worsening symptoms.      Interval History (7/14/23):  He returns today for follow up.  He reports that he has been having worsening low back and lower extremity pain.  States he continues to have bilateral low back pain as before though it has worsened since last encounter.  Also states that he is now having right-sided hip and thigh pain associated with his back pain.  These symptoms have been present for roughly 6 months.  This is typically present when sitting for prolonged periods of time.  States when he gets up he states that his thigh feels like it is being twisted.  The pain extends in a distribution consistent with the L3 dermatome.  Denies any associated paresthesias.  Does feel that the right lower extremity is weak when getting up after sitting though strength will return to normal after a few minutes.  Denies any associated bowel or bladder dysfunction.      Interval History NP (5/17/2022):  Mr Hernandez presents for delayed FU. Returning lower back pain where TPIs have done well in past and requesting repeat. He will be having shoulder surgery upcoming.  Denies new areas of pain or neurological changes. No focal voicing of  myelopathic symptoms such as handwriting changes or difficulty with buttons/coins/keys. Denies perineal paresthesias, bowel/bladder dysfunction. Currently taking neurontin and zanaflex being taken minimally at this time.       Interval History (9/22/21):  He returns today for follow up.  He reports that TPIs done at last visit have been helpful. His low back is doing well and does not need any further attention at this time. He does continue to have intermittent severe right shoulder pain. He denies any changes in the quality or location of this pain since last encounter. He denies any new or worsened symptoms.       Interval History (8/19/21):  He returns today for follow up.  He reports that physical therapy has been helpful for the right neck and shoulder pain.  He states that his neck pain was never very severe and has now essentially resolved.  He states that the vast majority of his pain is located right anterior shoulder.  The pain is exacerbated with certain movements and is particularly bothersome while sleeping.  He denies any associated numbness tingling with right shoulder pain.  Patient also states that he has recurrent low back pain.  Similar in quality and location to previous back pain that was well treated with trigger point injections.  He would like repeat trigger point injections done today      Interval History (6/4/21):  He returns today for follow up.  He reports that he has had right-sided neck and upper extremity pain for the past 2 weeks.  He states the pain started after handing his wife a heavy flower pot.  The pain is located the right lower cervical paraspinal region radiate to the right wrist with associated numbness and tingling in the fingers of the right hand.  He denies any focal weakness or bowel bladder dysfunction.  The pain is constant worse with activity.  The pain disrupts his sleep.       Interval History  (1/6/20):  He returns today for follow up.  He reports that lumbar trigger point injections have been helpful for the bilateral low back pain. He reports greater than 85% relief for over 9 months.  He states the pain has returned.  He denies any changes in the quality or location was pain. He would like repeat trigger point injections today.      Interval History (3/18/19):  He returns today for follow up.  He reports that trigger point injections have been helpful for the right-sided low back pain. He reports near complete resolution of the sharp right-sided low back pain. He does report that his previous low back pain starting to return.  He feels as though his current pain is exact same pain that was previously helped by sacroiliac joint injections.  He is interested in repeat injections if appropriate.      Interval History (2/12/19):  He returns today for follow up and imaging review.  He reports that his pain is unchanged since last encounter.  He denies any new or worsening neurologic symptoms.      Interval History (2/7/19):  He returns today for follow up.  He reports that he has been having acute right-sided low back pain for about 1 week.  He denies any specific inciting event or injury.  The pain is located in the lateral aspect of the right lumbosacral region.  It does not radiate.  He denies any associated numbness, tingling, weakness, bowel bladder dysfunction.  The pain varies in intensity from day-to-day.  The pain is much worse with coughing and sneezing and sitting with a forward flexed posture.  He is still able to go to the gym at times.      Interval History (12/17/18):  He returns today for follow up.  He reports that bilateral SIJ injections has been helpful for the low back pain. He reports about 80% relief. He does not feel that he needs any further interventions at this time      Interval History (11/19/18):  He returns today for follow up.  He reports that bilateral lumbar medial branch  blocks were not helpful. Pain is unchanged in quality and location since last visit. He denies any new symptoms.       Interval History (10/16/18):  He returns today for follow up.  He reports that he has continued to have bilateral low back pain. He still has some right lower extremity pain, but this is not as bothersome as his low back pain. The pain is located across the lower lumbar region R>L. It does not radiate. It is not associated with any numbness, tingling, weakness or b/b dysfunction. The pain is worse with activity. He also requests refills of tizanidine.      Interval History (12/18/17):  He returns today for follow up.  He reports that right L4 TFESI has been helpful for the right lumbar radicular pain. He reports 100% relief. He still has some lower back pain, but would prefer to discuss this later. Otherwise he is doing well.       Interval History (11/13/17):  He returns today for follow up.  He reports that PT has been helpful for the low back pain. He still has significant right foot and ankle pain when sitting in a reclined postion. This is the most painful area. He also has low back pain that is constant but does not bother him as much. Otherwise he is doing well.       Driss Christiansonshantal Fermin. is a 73 y.o. male who presents today with chronic bilateral low back pain R>>>L. Pain started over 40 years ago. No inciting injury or event noted. Pain is located mainly on the right side of the lower lumbar paraspinals. About 5 weeks ago, patient began to have right lwoer extremity pain that he felt was related to his back pain, but this has subsided. He denies any numbness, tingling, weakness, or b/b dysfunction. The pain is described as dull, but can become sharp at times. Patient states that his pain is worse in the morning. He does not sleep well. States that he wakes up every 45 minutes. Has taken Tizanidine PRN in the pat, but cannot recall how it affected him. Probably has not taken in over a  year. Cannot take NSAIDs due to decreased renal function. Has taken in the past with mild relief.  This pain is described in detail below.    Physical Therapy:  Yes.    Non-pharmacologic Treatment: Nothing really helps         TENS? No    Pain Medications:         Currently taking: Gabapentin, Tizandine. Tylenol p.r.n., Celebrex    Has tried in the past:  NSAIDs    Has not tried: Opioids, Tylenol, TCAs, SNRIs, topical creams    Blood thinners:  Plavix, Eliquis    Interventional Therapies:   11/29/17 - Right L4 TFESI - 100% relief  10/28/18 - bilateral SIJ injections -  80% relief  11/14/18 - Bilateral L2, L3, L4 and L5 MBBs - No relief noted  3/20/19 - bilateral sacroiliac joint steroid injections  08/02/2023 - right L5 transforaminal epidural steroid injection - 30% relief  10/11/2023 - right L3 (infraneural), S1 TF ANNIE - 80% relief  09/25/2024 - caudal epidural steroid injection - 75% relief    Relevant Surgeries: Previous right L4 hemilaminectomy    Affecting sleep? Yes    Affecting daily activities? yes    Depressive symptoms? no          SI/HI? No    Work status: Retired    Pain Scores:    Best:       3/10  Worst:     8/10  Usually:   4/10  Today:    4/10    Pain Disability Index  Family/Home Responsibilities:: 4  Recreation:: 4  Social Activity:: 4  Occupation:: 4  Sexual Behavior:: 8  Self Care:: 4  Life-Support Activities:: 8  Pain Disability Index (PDI): 36    Review of Systems   Constitutional:  Negative for activity change, appetite change, chills, fatigue, fever and unexpected weight change.   HENT:  Negative for hearing loss.    Eyes:  Negative for visual disturbance.   Respiratory:  Negative for chest tightness and shortness of breath.    Cardiovascular:  Negative for chest pain.   Gastrointestinal:  Negative for abdominal pain, constipation, diarrhea, nausea and vomiting.   Genitourinary:  Negative for difficulty urinating.   Musculoskeletal:  Positive for back pain, gait problem and myalgias.  Negative for neck pain.   Skin:  Negative for rash.   Neurological:  Positive for weakness. Negative for dizziness, light-headedness, numbness and headaches.   Psychiatric/Behavioral:  Positive for sleep disturbance. Negative for hallucinations and suicidal ideas. The patient is not nervous/anxious.        Past Medical History:   Diagnosis Date    Chronic heart failure with preserved ejection fraction 2/9/2023    Chronic midline low back pain without sciatica 10/2/2017    Colon polyps     Coronary artery disease involving native coronary artery of native heart 3/5/2020    Coronary artery disease involving native coronary artery of native heart with angina pectoris 12/10/2020    Diabetes mellitus with neurological manifestations, uncontrolled 1/24/2017    Diabetic polyneuropathy associated with type 2 diabetes mellitus 1/24/2017    Diabetic polyneuropathy associated with type 2 diabetes mellitus     Essential hypertension 1/24/2017    Gastroesophageal reflux disease 1/24/2017    Hyperlipidemia 1/24/2017    Insomnia 1/24/2017    Long-term insulin use 1/24/2017    Longstanding persistent atrial fibrillation 12/30/2020    Lumbar spondylosis 11/13/2017    Nuclear sclerosis of both eyes 8/24/2017    Obesity     PAD (peripheral artery disease) 3/23/2022    Stage 3 chronic kidney disease 2/13/2019    Uncontrolled type 2 diabetes mellitus without complication, with long-term current use of insulin 1/24/2017       Past Surgical History:   Procedure Laterality Date    ARTHROSCOPIC REPAIR OF ROTATOR CUFF OF SHOULDER Right 7/5/2022    Procedure: REPAIR, ROTATOR CUFF, ARTHROSCOPIC;  Surgeon: Valerie Olivera MD;  Location: [facility];  Service: Orthopedics;  Laterality: Right;  GUADALUPE AND SAADIA LEMUS [phone]/ TEXTED ALLAN ON 6/23/2022 @ 9:47AM. ALLAN RESPONDED ON 6/23/2022 @ 10:33AM-  JEAN PAUL BABIN [phone] MY BE HERE FOR CASE SPOKE TO HIM @ 9:59AM ON 7-1-2022  RN PREOP 6/28/2022    BACK SURGERY      2002    CATARACT  EXTRACTION W/  INTRAOCULAR LENS IMPLANT Right 08/29/2017    Dr. Hong    CATARACT EXTRACTION W/  INTRAOCULAR LENS IMPLANT Left 02/24/2022    Dr. Hong    CAUDAL EPIDURAL STEROID INJECTION N/A 9/25/2024    Procedure: Caudal Epidural Steroid Injection;  Surgeon: Eze Byrd Jr., MD;  Location: [facility] PAIN MANAGEMENT;  Service: Pain Management;  Laterality: N/A;  @1100(prev. 715)  Eliquis last 9/21  Plavix last 9/19  Check BG  E-Consent    COLONOSCOPY N/A 2/21/2017    Procedure: COLONOSCOPY;  Surgeon: Robb Rosado MD;  Location: [facility] ENDO;  Service: Endoscopy;  Laterality: N/A;    COLONOSCOPY N/A 7/5/2023    Procedure: COLONOSCOPY;  Surgeon: Aston Varela MD;  Location: [facility] ENDO;  Service: Endoscopy;  Laterality: N/A;  Referral: JOVANNI SCANLON  ok to hold Plavix 5 days and Eliquis 2 days per Dr Purdy-OFELIA  [facility]  Suprep   Inst portal   [facility]    CORONARY ANGIOGRAPHY INCLUDING BYPASS GRAFTS WITH CATHETERIZATION OF LEFT HEART N/A 11/11/2020    Procedure: ANGIOGRAM, CORONARY, INCLUDING BYPASS GRAFT, WITH LEFT HEART CATHETERIZATION;  Surgeon: Lane Cuellar MD;  Location: [facility] CATH LAB;  Service: Cardiology;  Laterality: N/A;  RN PRE OP 11-5-2020. --COVID NEGATIVE ON  11-. C A    CORONARY ARTERY BYPASS GRAFT      March 2016    EPIDURAL STEROID INJECTION Bilateral 11/14/2018    Procedure: Lumbar Medial Branch Blocks;  Surgeon: Eze Byrd Jr., MD;  Location: [facility] ENDO;  Service: Pain Management;  Laterality: Bilateral;  Bilateral Lumbar Medial Branch Blocks L2, L3, L4, L5    [id number]  [id number]    Arrive @ 1150; NO Sedation    EPIDURAL STEROID INJECTION Bilateral 11/28/2018    Procedure: Injection, Steroid, Epidural;  Surgeon: Eze Byrd Jr., MD;  Location: [facility] ENDO;  Service: Pain Management;  Laterality: Bilateral;  Bilateral Sacroiliac Joint Steroid Injections    [id number]    Arrive @ 0910    EPIDURAL STEROID INJECTION Bilateral 3/20/2019    Procedure: Injection, Steroid,  Sacroiliiac Joint;  Surgeon: Eze Byrd Jr., MD;  Location: [facility] ENDO;  Service: Pain Management;  Laterality: Bilateral;  Bilateral Sacroiliac Joint Steroid Injections    [id number]    Arrive @ 1145; Trigger point injections also?    EPIDURAL STEROID INJECTION Right 2023    Procedure: Right L5 Transforaminal Epidural Steroid Injection;  Surgeon: Eze Byrd Jr., MD;  Location: [facility] ENDO;  Service: Pain Management;  Laterality: Right;  @0830 (given)  Eliquis last   Plavix last   Check BG    EPIDURAL STEROID INJECTION Right 10/11/2023    Procedure: Right L3 (infraneural) + S1 Transforaminal Epidural Steroid Injections;  Surgeon: Eze Byrd Jr., MD;  Location: [facility] ENDO;  Service: Pain Management;  Laterality: Right;  @0745 (requests morning)  Eliquis 10/7 & Plavix 10/5  Check BG    ESOPHAGOGASTRODUODENOSCOPY N/A 2023    Procedure: EGD (ESOPHAGOGASTRODUODENOSCOPY);  Surgeon: Anita Oswald MD;  Location: [facility] ENDO;  Service: Endoscopy;  Laterality: N/A;  Procedure Timin-4 weeks  Provider: Any GI provider  Location: No Preference  Additional Scheduling Information: Blood thinners  Prep Specifications:N/A   ref. by Dr. Light, instr. to portal, ,  meds-st  ok to hold Plavix 5 days and Eliquis 2 day    INTRAOCULAR PROSTHESES INSERTION Left 2022    Procedure: INSERTION, IOL PROSTHESIS;  Surgeon: Nickolas Hong MD;  Location: [facility] OR;  Service: Ophthalmology;  Laterality: Left;    PHACOEMULSIFICATION OF CATARACT Left 2022    Procedure: PHACOEMULSIFICATION, CATARACT;  Surgeon: Nickolas Hong MD;  Location: [facility] OR;  Service: Ophthalmology;  Laterality: Left;  RN Phone Pre Op 22.  Covid NEGATIVE  22.  Arrival 08:00 am.    TONSILLECTOMY         Social History     Socioeconomic History    Marital status:    Tobacco Use    Smoking status: Never     Passive exposure: Never    Smokeless tobacco: Never   Substance and Sexual  Activity    Alcohol use: Yes     Comment: rare occassion    Drug use: No    Sexual activity: Yes     Partners: Female     Social Drivers of Health     Financial Resource Strain: Low Risk  (1/22/2024)    Overall Financial Resource Strain (CARDIA)     Difficulty of Paying Living Expenses: Not hard at all   Food Insecurity: No Food Insecurity (1/22/2024)    Hunger Vital Sign     Worried About Running Out of Food in the Last Year: Never true     Ran Out of Food in the Last Year: Never true   Transportation Needs: No Transportation Needs (1/22/2024)    PRAPARE - Transportation     Lack of Transportation (Medical): No     Lack of Transportation (Non-Medical): No   Physical Activity: Insufficiently Active (1/22/2024)    Exercise Vital Sign     Days of Exercise per Week: 3 days     Minutes of Exercise per Session: 30 min   Stress: Stress Concern Present (1/22/2024)    [employer] - Occupational Stress Questionnaire     Feeling of Stress : To some extent   Housing Stability: Low Risk  (1/22/2024)    Housing Stability Vital Sign     Unable to Pay for Housing in the Last Year: No     Number of Places Lived in the Last Year: 1     Unstable Housing in the Last Year: No       Review of patient's allergies indicates:   Allergen Reactions    Penicillins Other (See Comments)     Unknown reaction, Had a reaction as a child    Shrimp Itching     Hand itching       Current Outpatient Medications on File Prior to Visit   Medication Sig Dispense Refill    albuterol (PROVENTIL/VENTOLIN HFA) 90 mcg/actuation inhaler Inhale 2 puffs into the lungs every 4 (four) hours as needed for Shortness of Breath. Rescue 17 g 3    ascorbic acid, vitamin C, (VITAMIN C) 100 MG tablet Take 100 mg by mouth daily as needed.      atorvastatin (LIPITOR) 80 MG tablet TAKE 1 TABLET EVERY EVENING 90 tablet 3    azelastine (ASTELIN) 137 mcg (0.1 %) nasal spray 1 spray (137 mcg total) by Nasal route 2 (two) times daily. 30 mL 3    b  complex vitamins tablet Take 1 tablet by mouth once daily.      BD ALCOHOL SWABS PadM       blood sugar diagnostic Strp Check blood glucose 2 times a day. True metrix. E11.65 200 strip 3    clopidogreL (PLAVIX) 75 mg tablet Take 1 tablet (75 mg total) by mouth once daily. 90 tablet 3    coenzyme Q10 100 mg capsule Take 100 mg by mouth once daily.      dapagliflozin (FARXIGA) 5 mg Tab tablet Take 1 tablet (5 mg total) by mouth once daily. 30 tablet 3    dexamethasone sodium phosp/PF (DEXAMETHASONE SODIUM PHOS, PF,) 10 mg/mL Soln       ELIQUIS 5 mg Tab TAKE 1 TABLET TWICE DAILY 180 tablet 3    ergocalciferol (ERGOCALCIFEROL) 50,000 unit Cap TAKE 1 CAPSULE BY MOUTH WEEKLY 12 capsule 0    fenofibrate 160 MG Tab TAKE 1 TABLET EVERY MORNING 90 tablet 12    fluticasone propionate (FLONASE) 50 mcg/actuation nasal spray 2 sprays (100 mcg total) by Each Nostril route 2 (two) times daily. 18.2 mL 3    furosemide (LASIX) 20 MG tablet TAKE 1 TABLET TWICE DAILY 180 tablet 3    gabapentin (NEURONTIN) 100 MG capsule TAKE 2 CAPSULES EVERY MORNING AND TAKE 3 CAPSULES EVERY DAY WITH DINNER 450 capsule 12    glimepiride (AMARYL) 1 MG tablet TAKE 1 TABLET BEFORE BREAKFAST 90 tablet 3    hydrALAZINE (APRESOLINE) 50 MG tablet TAKE 1 TABLET TWICE DAILY 180 tablet 3    insulin degludec (TRESIBA FLEXTOUCH U-100) 100 unit/mL (3 mL) insulin pen Inject 20 Units into the skin once daily. 30 mL 4    isosorbide mononitrate (IMDUR) 120 MG 24 hr tablet Take 1 tablet (120 mg total) by mouth once daily. 90 tablet 3    ketoconazole (NIZORAL) 2 % cream Apply topically once daily. 60 g 6    lancets Misc Check blood glucose 2x/day. True metrix. E11.65 200 each 3    magnesium 250 mg Tab Take 1 tablet by mouth once daily.       metFORMIN (GLUCOPHAGE-XR) 500 MG ER 24hr tablet Take 1 tablet with breakfast and 2 tablets with supper. 270 tablet 2    omega-3 acid ethyl esters (LOVAZA) 1 gram capsule Take 2 capsules (2 g total) by mouth 2 (two) times daily. 360  "capsule 12    OMNIPAQUE 300 300 mg iodine/mL injection       pantoprazole (PROTONIX) 40 MG tablet TAKE 1 TABLET EVERY DAY 90 tablet 3    pen needle, diabetic 32 gauge x 1/4" Ndle Use daily 200 each 3    semaglutide (OZEMPIC) 2 mg/dose (8 mg/3 mL) PnIj Inject 2 mg into the skin every 7 days. 4 each 3    triazolam (HALCION) 0.25 MG Tab TAKE 1 TABLET BY MOUTH EVERY NIGHT AT BEDTIME AS NEEDED 90 tablet 5    TRUEPLUS LANCETS 33 gauge Misc       blood-glucose meter kit Test glucose 2-3x/day. True metrix 1 each 0    nitroGLYCERIN (NITROSTAT) 0.4 MG SL tablet Place 1 tablet (0.4 mg total) under the tongue every 5 (five) minutes as needed for Chest pain. 25 tablet 11    triazolam (HALCION) 0.25 MG Tab TAKE 1 TABLET BY MOUTH EVERY NIGHT AT BEDTIME AS NEEDED 30 tablet 1     Current Facility-Administered Medications on File Prior to Visit   Medication Dose Route Frequency Provider Last Rate Last Admin    cyclopentolate 1% ophthalmic solution 1 drop  1 drop Left Eye On Call Procedure Nickolas Hong MD   1 drop at 02/24/22 0901    ofloxacin 0.3 % ophthalmic solution 1 drop  1 drop Left Eye On Call Procedure Nickolas Hong MD   2 drop at 02/24/22 1017    sodium chloride 0.9% flush 10 mL  10 mL Intravenous PRN Nickolas Hong MD        triamcinolone acetonide injection 40 mg  40 mg Intra-articular  Valerie Olivera MD   40 mg at 02/09/23 1030       Objective:      BP (!) 128/54   Pulse (!) 43   SpO2 98%     Exam:  GEN:  Well developed, well nourished.  No acute distress.  Normal pain behavior.  HEENT:  No trauma.  Mucous membranes moist.  Nares patent bilaterally.  PSYCH: Normal affect. Thought content appropriate.  CHEST:  Breathing symmetric.  No audible wheezing.  ABD: Soft, non-distended.  SKIN:  Warm, pink, dry.  No rash on exposed areas.    EXT:  No cyanosis, clubbing, or edema.  No color change or changes in nail or hair growth.  NEURO/MUSCULOSKELETAL:  Fully alert, oriented, and appropriate. " Speech normal ivania. No cranial nerve deficits.   Gait:  Antalgic.  No trendelenburg sign bilaterally.   Motor Strength:  5/5 motor strength throughout lower extremities.   Sensory:  No sensory deficit in the lower extremities.   L-Spine:  Limited ROM with pain on flexion more than extension.  Negative pain with axial/facet loading bilaterally.  Negative SLR bilaterally.    TTP over bilateral lower lumbar paraspinals        Imaging:    Narrative & Impression    EXAMINATION:  MRI LUMBAR SPINE WITHOUT CONTRAST     CLINICAL HISTORY:  Lumbar radiculopathy, symptoms persist with conservative treatment; Spondylosis without myelopathy or radiculopathy, lumbar region     TECHNIQUE:  Multiplanar, multisequence MR images were acquired from the thoracolumbar junction to the sacrum without the administration of contrast.     COMPARISON:  02/11/2019.     FINDINGS:  There is susceptibility weighted artifact within the midline lower lumbar spine, suggestive of prior instrumentation.     The lumbar alignment is within normal limits.  There are scattered Schmorl's nodes.  The vertebral body heights are maintained.  The bone marrow signal is within normal limits.     The conus terminates at the level of T12.  The cauda equina nerve roots are within normal limits.     There is multilevel disc desiccation.  Evaluation of the individual disc levels reveals the following:     L1-L2, there is a disc bulge along with facet hypertrophy and ligamentum flavum hypertrophy.  The spinal canal and neural foramina are unremarkable.     L2-L3, there is a disc bulge along with facet hypertrophy and ligamentum flavum hypertrophy.  The spinal canal and neural foramina are unremarkable There is small amount of fluid within the facet joints.     L3-L4, there is diffuse disc bulge along with facet hypertrophy and ligamentum flavum.  The spinal canal is within normal limits.  There is mild spinal canal narrowing.     L4-L5, there is diffuse disc bulge  along with facet hypertrophy and ligamentum flavum hypertrophy.  There is superimposed central disc protrusion.  There is marked narrowing of the lateral recesses.  There is mild to moderate narrowing the spinal canal.  There is moderate bilateral neural foraminal narrowing.     L5-S1, there is diffuse disc bulge along with facet hypertrophy and ligamentum flavum.  The spinal canal is within normal limits.  There is moderate neural foraminal narrowing.     The paraspinal soft tissues are unremarkable.     Impression:     Changes of prior instrumentation in the lower lumbar spine at the L4-L5 level.  Unchanged appearance of soft tissue at the prior disc site at the L4-L5 level.     Mild to moderate narrowing of the spinal canal at the L4-L5 level.     Mild-to-moderate neural foraminal narrowing the lower lumbar spine as above.        Electronically signed by: Kevin Erickson MD  Date:                                            07/18/2023  Time:                                           09:04       Narrative & Impression    EXAMINATION:  XR CERVICAL SPINE AP LAT WITH FLEX EXTEN     CLINICAL HISTORY:  Other cervical disc degeneration, unspecified cervical region     TECHNIQUE:  Three views of the cervical spine plus flexion and extension views were performed.     COMPARISON:  None     FINDINGS:  Cervical spine is normal in alignment, vertebral body heights are maintained.  No displaced fracture or subluxation.  No suspicious bone lesion.     No instability between the flexion and extension images.     Mild and moderate multilevel degenerative disc disease and uncovertebral DJD.  Anterior osteophytosis at levels C2-3, C4-5, and C5-6.  Mild multilevel facet DJD.     Unremarkable soft tissues.     Impression:     No acute abnormality.  No instability.     Mild and moderate multilevel DJD.        Electronically signed by: Marco Vásquez MD  Date:                                            08/18/2021  Time:                                            08:35           Narrative     EXAMINATION:  MRI LUMBAR SPINE WITHOUT CONTRAST    CLINICAL HISTORY:  lumbar ddd; Other intervertebral disc degeneration, lumbar region    TECHNIQUE:  Multiplanar, multisequence MR images were acquired from the thoracolumbar junction to the sacrum without the administration of contrast.    COMPARISON:  08/23/2017    FINDINGS:  There is no evidence of fracture or marrow replacement process.  There is disc desiccation at all lumbar levels with disc space narrowing at multiple levels but most significant at L4-5.  Sagittal alignment is maintained.  Conus terminates at T12-L1.  Visualized retroperitoneal structures demonstrate no significant abnormalities.    T12-L1, L1-L2: Mild diffuse disc bulge with no significant central canal stenosis or neural foraminal narrowing.    L2-L3: Mild diffuse disc bulge with moderate facet arthropathy.  No significant central canal stenosis or neural foraminal narrowing.    L3-4: Mild diffuse disc bulge extending into both neural foramen with moderate facet arthropathy causing moderate right and mild left neural foraminal narrowing.  No significant central canal stenosis.    L4-5: Hemilaminectomy defect on the right.  There is a diffuse disc bulge extending into both neural foramen with a superimposed central extrusion with an annular fissure extending slightly below the level of the disc plane.  This causes mass effect on the lateral recess and may impinge on the descending nerve roots although the canal is decompressed posteriorly by the laminectomy defect with no significant central canal stenosis.  There is severe facet arthropathy contributing to severe bilateral neural foraminal narrowing.    L5-S1: There is severe facet arthropathy.  There is a diffuse disc bulge with no significant central canal stenosis.  There is severe bilateral neural foraminal narrowing..   Impression       Postoperative changes at L4 on the right with  decompression of the central canal.  There is lumbar spondylosis most significant at L4-5 and L5-S1 where there is severe bilateral neural foraminal narrowing.  Specific details at each level are discussed above.      Electronically signed by: Quinton Goins MD  Date: 02/12/2019  Time: 10:10         Assessment:       Encounter Diagnoses   Name Primary?    Degeneration of intervertebral disc of lumbar region with discogenic back pain and lower extremity pain Yes    Lumbar radiculopathy     Lumbar spondylosis     Postlaminectomy syndrome of lumbar region     Lumbar radiculopathy, chronic        Plan:       Driss was seen today for follow-up.    Diagnoses and all orders for this visit:    Degeneration of intervertebral disc of lumbar region with discogenic back pain and lower extremity pain    Lumbar radiculopathy    Lumbar spondylosis    Postlaminectomy syndrome of lumbar region  -     MRI Lumbar Spine Without Contrast; Future    Lumbar radiculopathy, chronic  -     MRI Lumbar Spine Without Contrast; Future        Driss Hernandez Jr. is a 73 y.o. male with worsening chronic midline/bilateral low back pain.  No overt neurologic deficits noted on examination.  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  This is chronic.  Mild improvement following right L5 TF ANNIE.  Significant improvement with right L3 infraneural, S1 TF ANNIE.  Today with more recent exacerbation of midline low back and b/l lower extremity pain.  I suspect that this might be related to acute intervertebral disc herniation and associated radiculitis/radiculopathy.  Moderate relief with caudal ANNIE.    Prior records reviewed.  Pertinent imaging studies reviewed by me. Imaging results were discussed with patient.  Patient is s/p caudal epidural steroid injection with 75% relief initially.  However over recent days reporting returned/worsening of pain.  Ordered lumbar MRI to get updated imaging.  Discussed with the patient that the exact etiology of  his pain is somewhat unclear.  Suspect the majority of his pain is related to an acute disc abnormality with associated radiculitis/radiculopathy.  Patient having specific pain from his knee down to his ankle/foot.  Also with more focal midline lower back pain.  Suspect his midline back pain may be related to a discogenic/vertebral genic source.  Previous MRI does show significant disc degeneration at L4-5 with an annular tear.  Also Modic type 2 changes noted.  Patient may be candidate for via disc although this would primarily target his midline lower back pain.    Stressed the importance of maintaining regular home exercise program and being mindful of how they use their back throughout the day.  Patient expressed understanding and agreement.  Return to clinic after imaging to discuss results.  May consider additional interventional procedures versus referral to Neurosurgery.      Grant German PA-C  [facility]  Interventional Pain Management   10/10/2024    This note was created by combination of typed  and M-Modal dictation.  Transcription and phonetic errors may be present.  If there are any questions, please contact me.

## 2024-10-17 RX ORDER — CLOPIDOGREL BISULFATE 75 MG/1
75 TABLET ORAL
Qty: 90 TABLET | Refills: 3 | Status: SHIPPED | OUTPATIENT
Start: 2024-10-17

## 2024-10-23 ENCOUNTER — PATIENT MESSAGE (OUTPATIENT)
Dept: FAMILY MEDICINE | Facility: CLINIC | Age: 74
End: 2024-10-23
Payer: MEDICARE

## 2024-10-25 ENCOUNTER — TELEPHONE (OUTPATIENT)
Dept: PAIN MEDICINE | Facility: CLINIC | Age: 74
End: 2024-10-25
Payer: MEDICARE

## 2024-10-25 NOTE — TELEPHONE ENCOUNTER
"Spoke with Pt's wife Misa. She stated the received a message about the MRI from the radiology department but everything has been resolved and the pt is scheduled for his MRI.        ----- Message from Miguel A sent at 10/25/2024  9:32 AM CDT -----  Regarding: Misa "Wife"  Type:Patient Callback     Who called: Misa "Wife"    What is the request in detail: stated that the insurance is not covering the MRI. She would like for someone in the office to reach out to her about this. It may be something on the Prior Authorization side. Please reach out to her as soon as possible.       Can the clinic reply by [facility]? Yes     Would the patient rather a call back or a response via [facility]? Callback     Best call back number: .[phone]      Additional Information:  "

## 2024-10-28 ENCOUNTER — HOSPITAL ENCOUNTER (OUTPATIENT)
Dept: RADIOLOGY | Facility: HOSPITAL | Age: 74
Discharge: HOME | End: 2024-10-28
Payer: MEDICARE

## 2024-10-28 DIAGNOSIS — M96.1 POSTLAMINECTOMY SYNDROME OF LUMBAR REGION: ICD-10-CM

## 2024-10-28 DIAGNOSIS — M54.16 LUMBAR RADICULOPATHY, CHRONIC: ICD-10-CM

## 2024-10-28 PROCEDURE — 72148 MRI LUMBAR SPINE W/O DYE: CPT | Mod: 26,,, | Performed by: STUDENT IN AN ORGANIZED HEALTH CARE EDUCATION/TRAINING PROGRAM

## 2024-10-28 PROCEDURE — 72148 MRI LUMBAR SPINE W/O DYE: CPT | Mod: TC

## 2024-10-30 ENCOUNTER — OFFICE VISIT (OUTPATIENT)
Dept: PAIN MEDICINE | Facility: CLINIC | Age: 74
End: 2024-10-30
Payer: MEDICARE

## 2024-10-30 VITALS — DIASTOLIC BLOOD PRESSURE: 72 MMHG | HEART RATE: 44 BPM | SYSTOLIC BLOOD PRESSURE: 171 MMHG | OXYGEN SATURATION: 100 %

## 2024-10-30 DIAGNOSIS — M54.16 LUMBAR RADICULOPATHY: ICD-10-CM

## 2024-10-30 DIAGNOSIS — M51.362 DEGENERATION OF INTERVERTEBRAL DISC OF LUMBAR REGION WITH DISCOGENIC BACK PAIN AND LOWER EXTREMITY PAIN: Primary | ICD-10-CM

## 2024-10-30 DIAGNOSIS — M47.816 LUMBAR SPONDYLOSIS: ICD-10-CM

## 2024-10-30 DIAGNOSIS — M96.1 POSTLAMINECTOMY SYNDROME OF LUMBAR REGION: ICD-10-CM

## 2024-10-30 PROCEDURE — 3288F FALL RISK ASSESSMENT DOCD: CPT | Mod: CPTII,S$GLB,,

## 2024-10-30 PROCEDURE — 3078F DIAST BP <80 MM HG: CPT | Mod: CPTII,S$GLB,,

## 2024-10-30 PROCEDURE — 99214 OFFICE O/P EST MOD 30 MIN: CPT | Mod: S$GLB,,,

## 2024-10-30 PROCEDURE — 1125F AMNT PAIN NOTED PAIN PRSNT: CPT | Mod: CPTII,S$GLB,,

## 2024-10-30 PROCEDURE — 1159F MED LIST DOCD IN RCRD: CPT | Mod: CPTII,S$GLB,,

## 2024-10-30 PROCEDURE — 1160F RVW MEDS BY RX/DR IN RCRD: CPT | Mod: CPTII,S$GLB,,

## 2024-10-30 PROCEDURE — 3044F HG A1C LEVEL LT 7.0%: CPT | Mod: CPTII,S$GLB,,

## 2024-10-30 PROCEDURE — 3061F NEG MICROALBUMINURIA REV: CPT | Mod: CPTII,S$GLB,,

## 2024-10-30 PROCEDURE — 3077F SYST BP >= 140 MM HG: CPT | Mod: CPTII,S$GLB,,

## 2024-10-30 PROCEDURE — 1101F PT FALLS ASSESS-DOCD LE1/YR: CPT | Mod: CPTII,S$GLB,,

## 2024-10-30 PROCEDURE — 99999 PR PBB SHADOW E&M-EST. PATIENT-LVL V: CPT | Mod: PBBFAC,,,

## 2024-10-30 PROCEDURE — 3066F NEPHROPATHY DOC TX: CPT | Mod: CPTII,S$GLB,,

## 2024-10-30 RX ORDER — GABAPENTIN 300 MG/1
600 CAPSULE ORAL 2 TIMES DAILY
Qty: 120 CAPSULE | Refills: 5 | Status: SHIPPED | OUTPATIENT
Start: 2024-10-30 | End: 2025-04-28

## 2024-11-11 ENCOUNTER — TELEPHONE (OUTPATIENT)
Dept: NEUROSURGERY | Facility: CLINIC | Age: 74
End: 2024-11-11
Payer: MEDICARE

## 2024-11-13 ENCOUNTER — OFFICE VISIT (OUTPATIENT)
Dept: NEUROSURGERY | Facility: CLINIC | Age: 74
End: 2024-11-13
Attending: STUDENT IN AN ORGANIZED HEALTH CARE EDUCATION/TRAINING PROGRAM
Payer: MEDICARE

## 2024-11-13 VITALS
HEIGHT: 67 IN | HEART RATE: 57 BPM | SYSTOLIC BLOOD PRESSURE: 153 MMHG | BODY MASS INDEX: 26.99 KG/M2 | OXYGEN SATURATION: 99 % | WEIGHT: 171.94 LBS | DIASTOLIC BLOOD PRESSURE: 72 MMHG

## 2024-11-13 DIAGNOSIS — M96.1 POSTLAMINECTOMY SYNDROME OF LUMBAR REGION: ICD-10-CM

## 2024-11-13 DIAGNOSIS — M51.362 DEGENERATION OF INTERVERTEBRAL DISC OF LUMBAR REGION WITH DISCOGENIC BACK PAIN AND LOWER EXTREMITY PAIN: ICD-10-CM

## 2024-11-13 DIAGNOSIS — M54.16 LUMBAR RADICULOPATHY: ICD-10-CM

## 2024-11-13 PROCEDURE — 3288F FALL RISK ASSESSMENT DOCD: CPT | Mod: CPTII,S$GLB,, | Performed by: STUDENT IN AN ORGANIZED HEALTH CARE EDUCATION/TRAINING PROGRAM

## 2024-11-13 PROCEDURE — 1125F AMNT PAIN NOTED PAIN PRSNT: CPT | Mod: CPTII,S$GLB,, | Performed by: STUDENT IN AN ORGANIZED HEALTH CARE EDUCATION/TRAINING PROGRAM

## 2024-11-13 PROCEDURE — 3066F NEPHROPATHY DOC TX: CPT | Mod: CPTII,S$GLB,, | Performed by: STUDENT IN AN ORGANIZED HEALTH CARE EDUCATION/TRAINING PROGRAM

## 2024-11-13 PROCEDURE — 1159F MED LIST DOCD IN RCRD: CPT | Mod: CPTII,S$GLB,, | Performed by: STUDENT IN AN ORGANIZED HEALTH CARE EDUCATION/TRAINING PROGRAM

## 2024-11-13 PROCEDURE — 1101F PT FALLS ASSESS-DOCD LE1/YR: CPT | Mod: CPTII,S$GLB,, | Performed by: STUDENT IN AN ORGANIZED HEALTH CARE EDUCATION/TRAINING PROGRAM

## 2024-11-13 PROCEDURE — 3061F NEG MICROALBUMINURIA REV: CPT | Mod: CPTII,S$GLB,, | Performed by: STUDENT IN AN ORGANIZED HEALTH CARE EDUCATION/TRAINING PROGRAM

## 2024-11-13 PROCEDURE — 3077F SYST BP >= 140 MM HG: CPT | Mod: CPTII,S$GLB,, | Performed by: STUDENT IN AN ORGANIZED HEALTH CARE EDUCATION/TRAINING PROGRAM

## 2024-11-13 PROCEDURE — 3078F DIAST BP <80 MM HG: CPT | Mod: CPTII,S$GLB,, | Performed by: STUDENT IN AN ORGANIZED HEALTH CARE EDUCATION/TRAINING PROGRAM

## 2024-11-13 PROCEDURE — 99204 OFFICE O/P NEW MOD 45 MIN: CPT | Mod: S$GLB,,, | Performed by: STUDENT IN AN ORGANIZED HEALTH CARE EDUCATION/TRAINING PROGRAM

## 2024-11-13 PROCEDURE — 3044F HG A1C LEVEL LT 7.0%: CPT | Mod: CPTII,S$GLB,, | Performed by: STUDENT IN AN ORGANIZED HEALTH CARE EDUCATION/TRAINING PROGRAM

## 2024-11-13 NOTE — PROGRESS NOTES
[facility]  Neurosurgery    SUBJECTIVE:     History of Present Illness:  Driss Hernandez Jr. is a 74 y.o. male past medical history significant for coronary artery disease status post bypass, diabetes mellitus (A1C 6.2), prior right L4/5 laminectomy 15 years ago, who presents for evaluation of 8 months of right lower extremity pain.    Patient states he has been dealing with this for about 8 months.  He has had numerous epidural steroid injections which have provided some relief.  He currently takes a sleeping pill at night followed by 3 gabapentin pills and this helps him get to bed comfortably, but he wakes up around 3:00 a.m. with severe right lower extremity pain.  He describes this is coming down the right leg to the anterior lower leg toward the anterior shin and ankle.  This sounds like a combination of L4 and L5.    So far this year, he has been through physical therapy as well as numerous epidural steroid injections, most recently a caudal epidural.  This did provide some pain relief.  However, he has severe right lower extremity pain which bothers him significantly.  He does have low back pain which is something he has dealt with for years.  It is worse when he is sitting.  It is okay when he is up and moving.  He states that if his right lower extremity pain could be improved this would be a successful surgical result for him.    (Not in a hospital admission)      Review of patient's allergies indicates:   Allergen Reactions    Penicillins Other (See Comments)     Unknown reaction, Had a reaction as a child    Shrimp Itching     Hand itching       Past Medical History:   Diagnosis Date    Chronic heart failure with preserved ejection fraction 2/9/2023    Chronic midline low back pain without sciatica 10/2/2017    Colon polyps     Coronary artery disease involving native coronary artery of native heart 3/5/2020    Coronary artery disease involving native coronary artery of native heart with  angina pectoris 12/10/2020    Diabetes mellitus with neurological manifestations, uncontrolled 1/24/2017    Diabetic polyneuropathy associated with type 2 diabetes mellitus 1/24/2017    Diabetic polyneuropathy associated with type 2 diabetes mellitus     Essential hypertension 1/24/2017    Gastroesophageal reflux disease 1/24/2017    Hyperlipidemia 1/24/2017    Insomnia 1/24/2017    Long-term insulin use 1/24/2017    Longstanding persistent atrial fibrillation 12/30/2020    Lumbar spondylosis 11/13/2017    Nuclear sclerosis of both eyes 8/24/2017    Obesity     PAD (peripheral artery disease) 3/23/2022    Stage 3 chronic kidney disease 2/13/2019    Uncontrolled type 2 diabetes mellitus without complication, with long-term current use of insulin 1/24/2017     Past Surgical History:   Procedure Laterality Date    ARTHROSCOPIC REPAIR OF ROTATOR CUFF OF SHOULDER Right 7/5/2022    Procedure: REPAIR, ROTATOR CUFF, ARTHROSCOPIC;  Surgeon: Valerie Olivera MD;  Location: [facility] OR;  Service: Orthopedics;  Laterality: Right;  GUADALUPE AND NEPHJENNIFER LEMUS [phone]/ TEXTED ALLAN ON 6/23/2022 @ 9:47AM. ALLAN RESPONDED ON 6/23/2022 @ 10:33AM-LO  JEAN PAUL BABIN [phone] MY BE HERE FOR CASE SPOKE TO HIM @ 9:59AM ON 7-1-2022  RN PREOP 6/28/2022    BACK SURGERY      2002    CATARACT EXTRACTION W/  INTRAOCULAR LENS IMPLANT Right 08/29/2017    Dr. Hong    CATARACT EXTRACTION W/  INTRAOCULAR LENS IMPLANT Left 02/24/2022    Dr. Hong    CAUDAL EPIDURAL STEROID INJECTION N/A 9/25/2024    Procedure: Caudal Epidural Steroid Injection;  Surgeon: Eze Byrd Jr., MD;  Location: [facility] PAIN MANAGEMENT;  Service: Pain Management;  Laterality: N/A;  @1100(prev. 715)  Eliquis last 9/21  Plavix last 9/19  Check BG  E-Consent    COLONOSCOPY N/A 2/21/2017    Procedure: COLONOSCOPY;  Surgeon: Robb Rosado MD;  Location: [facility] ENDO;  Service: Endoscopy;  Laterality: N/A;    COLONOSCOPY N/A 7/5/2023    Procedure: COLONOSCOPY;   Surgeon: Aston Varela MD;  Location: [facility] ENDO;  Service: Endoscopy;  Laterality: N/A;  Referral: GHISLAINE JOVANNI A.  ok to hold Plavix 5 days and Eliquis 2 days per Dr Purdy-OFELIA  [facility]  Suprep   Inst portal   LW    CORONARY ANGIOGRAPHY INCLUDING BYPASS GRAFTS WITH CATHETERIZATION OF LEFT HEART N/A 11/11/2020    Procedure: ANGIOGRAM, CORONARY, INCLUDING BYPASS GRAFT, WITH LEFT HEART CATHETERIZATION;  Surgeon: Lane Cuellar MD;  Location: [facility] CATH LAB;  Service: Cardiology;  Laterality: N/A;  RN PRE OP 11-5-2020. --COVID NEGATIVE ON  11-. C A    CORONARY ARTERY BYPASS GRAFT      March 2016    EPIDURAL STEROID INJECTION Bilateral 11/14/2018    Procedure: Lumbar Medial Branch Blocks;  Surgeon: Eze Byrd Jr., MD;  Location: [facility] ENDO;  Service: Pain Management;  Laterality: Bilateral;  Bilateral Lumbar Medial Branch Blocks L2, L3, L4, L5    [id number]  [id number]    Arrive @ 1150; NO Sedation    EPIDURAL STEROID INJECTION Bilateral 11/28/2018    Procedure: Injection, Steroid, Epidural;  Surgeon: zEe Byrd Jr., MD;  Location: [facility];  Service: Pain Management;  Laterality: Bilateral;  Bilateral Sacroiliac Joint Steroid Injections    43630    Arrive @ 0910    EPIDURAL STEROID INJECTION Bilateral 3/20/2019    Procedure: Injection, Steroid, Sacroiliiac Joint;  Surgeon: Eze Byrd Jr., MD;  Location: [facility];  Service: Pain Management;  Laterality: Bilateral;  Bilateral Sacroiliac Joint Steroid Injections    71253    Arrive @ 1145; Trigger point injections also?    EPIDURAL STEROID INJECTION Right 8/2/2023    Procedure: Right L5 Transforaminal Epidural Steroid Injection;  Surgeon: Eze Byrd Jr., MD;  Location: [facility];  Service: Pain Management;  Laterality: Right;  @0830 (given)  Eliquis last 7/29  Plavix last 7/27  Check BG    EPIDURAL STEROID INJECTION Right 10/11/2023    Procedure: Right L3 (infraneural) + S1 Transforaminal Epidural Steroid Injections;  Surgeon:  Eze Byrd Jr., MD;  Location: [facility] ENDO;  Service: Pain Management;  Laterality: Right;  @0745 (requests morning)  Eliquis 10/7 & Plavix 10  Check BG    ESOPHAGOGASTRODUODENOSCOPY N/A 2023    Procedure: EGD (ESOPHAGOGASTRODUODENOSCOPY);  Surgeon: Anita Oswald MD;  Location: [facility] ENDO;  Service: Endoscopy;  Laterality: N/A;  Procedure Timin-4 weeks  Provider: Any GI provider  Location: No Preference  Additional Scheduling Information: Blood thinners  Prep Specifications:N/A   ref. by Dr. Light, instr. to portal, , [facility] meds-st  ok to hold Plavix 5 days and Eliquis 2 day    INTRAOCULAR PROSTHESES INSERTION Left 2022    Procedure: INSERTION, IOL PROSTHESIS;  Surgeon: Nickolas Hong MD;  Location: [facility] OR;  Service: Ophthalmology;  Laterality: Left;    PHACOEMULSIFICATION OF CATARACT Left 2022    Procedure: PHACOEMULSIFICATION, CATARACT;  Surgeon: Nickolas Hong MD;  Location: [facility] OR;  Service: Ophthalmology;  Laterality: Left;  RN Phone Pre Op 22.  Covid NEGATIVE  22.  Arrival 08:00 am.    TONSILLECTOMY       Family History   Problem Relation Name Age of Onset    Depression Mother      Heart disease Mother      Stroke Father      No Known Problems Sister Elaine     Diabetes Sister Dilcia     No Known Problems Sister Idalia     Blindness Brother Burt     No Known Problems Maternal Aunt      No Known Problems Maternal Uncle      No Known Problems Paternal Aunt      No Known Problems Paternal Uncle      No Known Problems Maternal Grandmother      No Known Problems Maternal Grandfather      No Known Problems Paternal Grandmother      No Known Problems Paternal Grandfather      Amblyopia Neg Hx      Cancer Neg Hx      Cataracts Neg Hx      Glaucoma Neg Hx      Hypertension Neg Hx      Macular degeneration Neg Hx      Retinal detachment Neg Hx      Strabismus Neg Hx      Thyroid disease Neg Hx       Social History     Tobacco Use    Smoking status:  Never     Passive exposure: Never    Smokeless tobacco: Never   Substance Use Topics    Alcohol use: Yes     Comment: rare occassion    Drug use: No        Review of Systems:  As noted in HPI    OBJECTIVE:     Vital Signs (Most Recent):       Physical Exam:  General: well developed, well nourished, no distress  Head: normocephalic, atraumatic  Neurologic: Alert and oriented. Thought content appropriate  Speech: Fluent  Cranial nerves: face symmetric   Eyes: pupils equal  Pulmonary: normal respirations  Sensory: intact to light touch throughout  Motor Strength: Moves all extremities spontaneously with good tone.     Delt Bi Tri Wrist ext.  IO  RUE:   5    5   5        5          5    5  LUE:      5    5   5        5          5    5   HF KE EHL DF PF  RLE   5    5     4     4    5  LLE:  5    5     4     4    5    DTR's - diminished patellar reflexes, left worse than right  Alva: absent    Gait: normal  Tandem Gait: No difficulty     Midline Bony Tenderness: none  Paraspinous muscle tenderness: none  Straight Leg Raise: positive on right at 45 deg    Diagnostic Results:  I have personally reviewed imaging. My impression is as follows.    MRI of the lumbar Spine without contrast was performed on 10/28/2024.  This shows spondylosis at every level in the lumbar spine.  There is evidence of a prior laminectomy at the L4-5 level.  There has also been a right-sided medial facetectomy with 1/3 of the facet removed.  There is lateral recess stenosis bilaterally at this level with compression of the L5 nerve roots.  There does appear to be a right-sided disc extrusion which proceed somewhat caudally and compresses the L5 nerve root in the right.  In terms of foraminal stenosis, I believe the L4 root is free.    Previous CT chest abdomen pelvis from 06/24/2023 again show spondylosis.  The disc bulge causing lateral recess stenosis at L4-5 appears to be calcified.      ASSESSMENT/PLAN:     Driss Hernandez Jr. is a 74  y.o. male who presents with continued right-greater-than-left radiculopathy in the setting of prior L4-5 laminectomy.  He has failed conservative management including therapy, medications, injections.  -patient is doing okay with his pain right now, but it is frustrating enough that he is leaning towards surgery  -I discussed with him his options are to 1) do nothing, 2) perform fusion at that level, or 3) perform a repeat laminectomy/microdiskectomy.  -patient has had a significant amount of facet removed, and we would need to remove more in order to visualize normal tissue and dissect nerves away from the scar tissue from the last surgery.  I discussed with him that there is risk he could have to proceed to a fusion if he developed worsening back pain or instability after laminectomy  -patient is going to think about his options and get back to us when he has decided.  He states he is leaning toward going with surgery  -patient does need flexion-extension x-rays      Please feel free to call with any further questions.      Time spent on this encounter: 45 minutes. This includes face-to-face time and non-face to face time preparing to see the patient (eg, review of tests), obtaining and/or reviewing separately obtained history, documenting clinical information in the electronic or other health record, independently interpreting results and communicating results to the patient/family/caregiver, or care coordinator.      Luis M HIGH [place]  [facility]  Department of Neurosurgery  [phone]    Disclaimer: This note was dictated by speech recognition. Minor errors in transcription may be present.  Please call with any questions.

## 2024-11-18 DIAGNOSIS — Z79.4 TYPE 2 DIABETES MELLITUS WITH HYPERGLYCEMIA, WITH LONG-TERM CURRENT USE OF INSULIN: ICD-10-CM

## 2024-11-18 DIAGNOSIS — N18.31 STAGE 3A CHRONIC KIDNEY DISEASE: ICD-10-CM

## 2024-11-18 DIAGNOSIS — E11.65 TYPE 2 DIABETES MELLITUS WITH HYPERGLYCEMIA, WITH LONG-TERM CURRENT USE OF INSULIN: ICD-10-CM

## 2024-11-18 NOTE — TELEPHONE ENCOUNTER
No care due was identified.  [facility] Embedded Care Due Messages. Reference number: [id number].   11/18/2024 1:20:18 PM CST

## 2024-11-18 NOTE — TELEPHONE ENCOUNTER
----- Message from [email or web address] sent at 11/18/2024  1:02 PM CST -----  Name of Who is Calling: Pt wife Ada is calling on behalf of FREDY ROLLE JR. [[id number]]          What is the request in detail: pt wife is requesting a call back regarding medication Farxiga. Please assist.           Can the clinic reply by [facility]: No          What Number to Call Back if not in [facility]: [phone]

## 2024-11-18 NOTE — TELEPHONE ENCOUNTER
I spoke with the patient's wife, Misa, who requested for the Farxiga Rx to be faxed to [facility] [phone]. Please sign and put in the basket to be faxed. Thank you.

## 2024-11-19 RX ORDER — DAPAGLIFLOZIN 5 MG/1
5 TABLET, FILM COATED ORAL DAILY
Qty: 90 TABLET | Refills: 3 | Status: SHIPPED | OUTPATIENT
Start: 2024-11-19

## 2024-11-29 ENCOUNTER — OFFICE VISIT (OUTPATIENT)
Dept: PAIN MEDICINE | Facility: CLINIC | Age: 74
End: 2024-11-29
Payer: MEDICARE

## 2024-11-29 VITALS — HEART RATE: 64 BPM | DIASTOLIC BLOOD PRESSURE: 71 MMHG | SYSTOLIC BLOOD PRESSURE: 152 MMHG | OXYGEN SATURATION: 99 %

## 2024-11-29 DIAGNOSIS — M51.362 DEGENERATION OF INTERVERTEBRAL DISC OF LUMBAR REGION WITH DISCOGENIC BACK PAIN AND LOWER EXTREMITY PAIN: Primary | ICD-10-CM

## 2024-11-29 DIAGNOSIS — M47.816 LUMBAR SPONDYLOSIS: ICD-10-CM

## 2024-11-29 DIAGNOSIS — M96.1 POSTLAMINECTOMY SYNDROME OF LUMBAR REGION: ICD-10-CM

## 2024-11-29 DIAGNOSIS — M54.16 LUMBAR RADICULOPATHY: ICD-10-CM

## 2024-11-29 PROCEDURE — 99999 PR PBB SHADOW E&M-EST. PATIENT-LVL IV: CPT | Mod: PBBFAC,HCNC,,

## 2024-11-29 NOTE — PROGRESS NOTES
Subjective:     Patient ID: Driss Hernandez Jr. is a 74 y.o. male    Chief Complaint: Follow-up      Referred by: No ref. provider found      HPI:    Interval History PA (11/29/2024):  Patient returns to clinic for follow up of bilateral lower back and extremity pain.  Patient has discussed treatment options with Neurosurgery.  Currently discussing a lumbar fusion versus repeat laminectomy/microdiskectomy patient does plan on moving forward with surgery in his going to contact Neurosurgery about scheduling.  Otherwise he denies significant changes in the quality or location of his pain.  Continues to have midline lower back pain that radiates into his bilateral lower extremities, worse in the right.  Worsened his pain is primarily from the knee down to his ankle on the right side.  Similar left-sided lower extremity pain although less severe and more intermittent.  He does note some continued benefit from the previous caudal ANNIE performed.  Denies any weakness, bowel or bladder dysfunction.    Interval History PA (10/30/2024):  Patient returns to clinic for follow up of bilateral lower back and extremity pain and MRI review.  Patient denies changes in the quality or location of his pain since previous encounter.  Denies new or worsening symptoms.  He does continue to note midline lower lumbar pain into his bilateral lower extremities, worse on the right.  Worst of his pain is into his right lower extremity primarily from his knee down to his ankle.  Previously he did report left lower extremity pain although this has improved with recent caudal ANNIE.  He denies any numbness, tingling, weakness, bowel or bladder dysfunction.  His pain is worsened when initiating activity.  Worse when getting up from a seated position or when getting up in the morning.  He also continues to note increased pain when bending forward or with spinal flexion activities.  He does note some benefit from gabapentin.  Currently taking 400  mg q.h.s., denies adverse effects from the medication.    Interval History PA (10/10/2024):  Patient returns to clinic for follow up of bilateral lower back and extremity pain.  Patient is s/p caudal epidural steroid injection done on 09/25/2024 with initial good relief.  Patient reporting about 75% relief following the procedure.  However over the past few days his pain has been returning/worsening.  Denies significant changes in the quality or location of his pain.  States pain primarily located in his midline lower lumbar spine and we will extend into his bilateral lower extremities, worse on the right.  Notes his left lower extremity pain has significant improvement with the recent injection.  However persistent pain mainly along his right distal extremity from his knee to his ankle.  Denies any associated numbness, tingling, weakness, bowel or bladder dysfunction.  His pain is worse in the morning when getting out of bed or with activity.  Also pain lying supine at night causing difficulty sleeping.  Notes increased pain when bending or leaning forward.  Specifically having pain when trying to put on socks.    Interval History (9/18/24):  He returns today for follow up.  He reports that that he has been having worsening low back and lower extremity pain. This pain is different in quality and location from previous pain.  It is located in the midline lower lumbar region and will radiate to the b/l lower extremities from the knees to the feet/ankles. He denies any associated numbness/tingling, weakness or b/b dysfunction. Pain is mostly present in the mornings when getting out of bed or when initiating activity after rest. States that he has minimal pain when standing/walking. Also continues to have some right lower extremity radicular pain, but this is improved since last ANNIE.     Interval History PA (11/01/2023):  Patient returns to clinic for follow up.  Patient is s/p right infraneural L3, S1 transforaminal  epidural steroid injection done on 10/11/2023 with 80% relief of right-sided lower back and extremity pain.  Notes improvement started approximately 1 week following the procedure.  Notes pain in his right lower extremity has been overall manageable.  Denies any numbness, tingling, weakness, bowel or bladder dysfunction.  Patient does note a recent fall where he landed on his knees and feels he strained his lower back.  Notes diffuse increased pain across his lumbar paraspinal region.  Denies any radiation of pain.  Also with increased pain in his bilateral knees, abrasions and bruising noted.  Notes the incident occurred approximately 1 week ago and symptoms have been improving since.  Patient has been taking over-the-counter pain medication with benefit.    Interval History PA (09/21/2023):  Patient returns to clinic for follow up.  Patient notes some worsening of his chronic right-sided lower back and extremity pain.  Notes previous right L5 TF ANNIE done on August did provide some initial relief although short-lived.  States pain was manageable for a few weeks however has since been worsening.  Continues to endorse pain located in his right lumbar paraspinal region.  Majority of his pain is located from his right lateral hip radiating into his right anterior thigh, down to his knee.  Denies radiation distal to this point.  Denies any numbness, tingling, weakness, bowel bladder dysfunction.  Notes difficulty sleeping due to increased pain, worsened when lying supine.  Patient interested in proceeding with the previously discussed procedure.  States that he is not overtly interested in exploring surgical options at this time.    Interval History PA (08/17/2023):  Patient returns to clinic for follow up.  Patient is s/p right L5 transforaminal epidural steroid injection done on 08/02/2023 reporting some initial relief with the 1st few days following the procedure.  Overall continued 30% relief.  Denies any changes in  the quality or location of his pain.  States that he has been having minimal back pain, only notices a pressure sensation in his lower lumbar spine.  The majority of his pain is located between his right lateral hip into his anterior thigh stopping at the knee.  States his pain is intermittent, typically only present when getting out of bed, or standing up from a chair.  Notes minimal to no pain with activity or rest.  States symptoms still bothersome but overall tolerable at this time.  Denies any numbness, tingling, weakness, bowel or bladder dysfunction.    Interval History (7/18/23):  He returns today for follow up and MRI review.  He reports that his pain is unchanged in quality and location since last encounter.  He denies any new or worsening symptoms.      Interval History (7/14/23):  He returns today for follow up.  He reports that he has been having worsening low back and lower extremity pain.  States he continues to have bilateral low back pain as before though it has worsened since last encounter.  Also states that he is now having right-sided hip and thigh pain associated with his back pain.  These symptoms have been present for roughly 6 months.  This is typically present when sitting for prolonged periods of time.  States when he gets up he states that his thigh feels like it is being twisted.  The pain extends in a distribution consistent with the L3 dermatome.  Denies any associated paresthesias.  Does feel that the right lower extremity is weak when getting up after sitting though strength will return to normal after a few minutes.  Denies any associated bowel or bladder dysfunction.      Interval History NP (5/17/2022):  Mr Hernandez presents for delayed FU. Returning lower back pain where TPIs have done well in past and requesting repeat. He will be having shoulder surgery upcoming. Denies new areas of pain or neurological changes. No focal voicing of  myelopathic symptoms such as handwriting  changes or difficulty with buttons/coins/keys. Denies perineal paresthesias, bowel/bladder dysfunction. Currently taking neurontin and zanaflex being taken minimally at this time.       Interval History (9/22/21):  He returns today for follow up.  He reports that TPIs done at last visit have been helpful. His low back is doing well and does not need any further attention at this time. He does continue to have intermittent severe right shoulder pain. He denies any changes in the quality or location of this pain since last encounter. He denies any new or worsened symptoms.       Interval History (8/19/21):  He returns today for follow up.  He reports that physical therapy has been helpful for the right neck and shoulder pain.  He states that his neck pain was never very severe and has now essentially resolved.  He states that the vast majority of his pain is located right anterior shoulder.  The pain is exacerbated with certain movements and is particularly bothersome while sleeping.  He denies any associated numbness tingling with right shoulder pain.  Patient also states that he has recurrent low back pain.  Similar in quality and location to previous back pain that was well treated with trigger point injections.  He would like repeat trigger point injections done today      Interval History (6/4/21):  He returns today for follow up.  He reports that he has had right-sided neck and upper extremity pain for the past 2 weeks.  He states the pain started after handing his wife a heavy flower pot.  The pain is located the right lower cervical paraspinal region radiate to the right wrist with associated numbness and tingling in the fingers of the right hand.  He denies any focal weakness or bowel bladder dysfunction.  The pain is constant worse with activity.  The pain disrupts his sleep.       Interval History (1/6/20):  He returns today for follow up.  He reports that lumbar trigger point injections have been helpful  for the bilateral low back pain. He reports greater than 85% relief for over 9 months.  He states the pain has returned.  He denies any changes in the quality or location was pain. He would like repeat trigger point injections today.      Interval History (3/18/19):  He returns today for follow up.  He reports that trigger point injections have been helpful for the right-sided low back pain. He reports near complete resolution of the sharp right-sided low back pain. He does report that his previous low back pain starting to return.  He feels as though his current pain is exact same pain that was previously helped by sacroiliac joint injections.  He is interested in repeat injections if appropriate.      Interval History (2/12/19):  He returns today for follow up and imaging review.  He reports that his pain is unchanged since last encounter.  He denies any new or worsening neurologic symptoms.      Interval History (2/7/19):  He returns today for follow up.  He reports that he has been having acute right-sided low back pain for about 1 week.  He denies any specific inciting event or injury.  The pain is located in the lateral aspect of the right lumbosacral region.  It does not radiate.  He denies any associated numbness, tingling, weakness, bowel bladder dysfunction.  The pain varies in intensity from day-to-day.  The pain is much worse with coughing and sneezing and sitting with a forward flexed posture.  He is still able to go to the gym at times.      Interval History (12/17/18):  He returns today for follow up.  He reports that bilateral SIJ injections has been helpful for the low back pain. He reports about 80% relief. He does not feel that he needs any further interventions at this time      Interval History (11/19/18):  He returns today for follow up.  He reports that bilateral lumbar medial branch blocks were not helpful. Pain is unchanged in quality and location since last visit. He denies any new  symptoms.       Interval History (10/16/18):  He returns today for follow up.  He reports that he has continued to have bilateral low back pain. He still has some right lower extremity pain, but this is not as bothersome as his low back pain. The pain is located across the lower lumbar region R>L. It does not radiate. It is not associated with any numbness, tingling, weakness or b/b dysfunction. The pain is worse with activity. He also requests refills of tizanidine.      Interval History (12/18/17):  He returns today for follow up.  He reports that right L4 TFESI has been helpful for the right lumbar radicular pain. He reports 100% relief. He still has some lower back pain, but would prefer to discuss this later. Otherwise he is doing well.       Interval History (11/13/17):  He returns today for follow up.  He reports that PT has been helpful for the low back pain. He still has significant right foot and ankle pain when sitting in a reclined postion. This is the most painful area. He also has low back pain that is constant but does not bother him as much. Otherwise he is doing well.       Driss Hernandez Jr. is a 74 y.o. male who presents today with chronic bilateral low back pain R>>>L. Pain started over 40 years ago. No inciting injury or event noted. Pain is located mainly on the right side of the lower lumbar paraspinals. About 5 weeks ago, patient began to have right lwoer extremity pain that he felt was related to his back pain, but this has subsided. He denies any numbness, tingling, weakness, or b/b dysfunction. The pain is described as dull, but can become sharp at times. Patient states that his pain is worse in the morning. He does not sleep well. States that he wakes up every 45 minutes. Has taken Tizanidine PRN in the pat, but cannot recall how it affected him. Probably has not taken in over a year. Cannot take NSAIDs due to decreased renal function. Has taken in the past with mild relief.  This  pain is described in detail below.    Physical Therapy:  Yes.    Non-pharmacologic Treatment: Nothing really helps         TENS? No    Pain Medications:         Currently taking: Gabapentin, Tizandine. Tylenol p.r.n., Celebrex    Has tried in the past:  NSAIDs    Has not tried: Opioids, Tylenol, TCAs, SNRIs, topical creams    Blood thinners:  Plavix, Eliquis    Interventional Therapies:   11/29/17 - Right L4 TFESI - 100% relief  10/28/18 - bilateral SIJ injections -  80% relief  11/14/18 - Bilateral L2, L3, L4 and L5 MBBs - No relief noted  3/20/19 - bilateral sacroiliac joint steroid injections  08/02/2023 - right L5 transforaminal epidural steroid injection - 30% relief  10/11/2023 - right L3 (infraneural), S1 TF ANNIE - 80% relief  09/25/2024 - caudal epidural steroid injection - 75% relief    Relevant Surgeries: Previous right L4 hemilaminectomy    Affecting sleep? Yes    Affecting daily activities? yes    Depressive symptoms? no          SI/HI? No    Work status: Retired    Pain Scores:    Best:       4/10  Worst:     8/10  Usually:   5/10  Today:    4/10    Pain Disability Index  Family/Home Responsibilities:: 4  Recreation:: 5  Social Activity:: 4  Occupation:: 5  Sexual Behavior:: 5  Self Care:: 4  Life-Support Activities:: 6  Pain Disability Index (PDI): 33    Review of Systems   Constitutional:  Negative for activity change, appetite change, chills, fatigue, fever and unexpected weight change.   HENT:  Negative for hearing loss.    Eyes:  Negative for visual disturbance.   Respiratory:  Negative for chest tightness and shortness of breath.    Cardiovascular:  Negative for chest pain.   Gastrointestinal:  Negative for abdominal pain, constipation, diarrhea, nausea and vomiting.   Genitourinary:  Negative for difficulty urinating.   Musculoskeletal:  Positive for back pain, gait problem and myalgias. Negative for neck pain.   Skin:  Negative for rash.   Neurological:  Positive for weakness. Negative for  dizziness, light-headedness, numbness and headaches.   Psychiatric/Behavioral:  Positive for sleep disturbance. Negative for hallucinations and suicidal ideas. The patient is not nervous/anxious.        Past Medical History:   Diagnosis Date    Chronic heart failure with preserved ejection fraction 2/9/2023    Chronic midline low back pain without sciatica 10/2/2017    Colon polyps     Coronary artery disease involving native coronary artery of native heart 3/5/2020    Coronary artery disease involving native coronary artery of native heart with angina pectoris 12/10/2020    Diabetes mellitus with neurological manifestations, uncontrolled 1/24/2017    Diabetic polyneuropathy associated with type 2 diabetes mellitus 1/24/2017    Diabetic polyneuropathy associated with type 2 diabetes mellitus     Essential hypertension 1/24/2017    Gastroesophageal reflux disease 1/24/2017    Hyperlipidemia 1/24/2017    Insomnia 1/24/2017    Long-term insulin use 1/24/2017    Longstanding persistent atrial fibrillation 12/30/2020    Lumbar spondylosis 11/13/2017    Nuclear sclerosis of both eyes 8/24/2017    Obesity     PAD (peripheral artery disease) 3/23/2022    Stage 3 chronic kidney disease 2/13/2019    Uncontrolled type 2 diabetes mellitus without complication, with long-term current use of insulin 1/24/2017       Past Surgical History:   Procedure Laterality Date    ARTHROSCOPIC REPAIR OF ROTATOR CUFF OF SHOULDER Right 7/5/2022    Procedure: REPAIR, ROTATOR CUFF, ARTHROSCOPIC;  Surgeon: Valerei Olivera MD;  Location: [facility];  Service: Orthopedics;  Laterality: Right;  GUADALUPE AND NEPHJENNIFER LEMUS [phone]/ TEXTED ALLAN ON 6/23/2022 @ 9:47AM. ALLAN RESPONDED ON 6/23/2022 @ 10:33AM-  JEAN PAUL BABIN [phone] MY BE HERE FOR CASE SPOKE TO HIM @ 9:59AM ON 7-1-2022  RN PREOP 6/28/2022    BACK SURGERY      2002    CATARACT EXTRACTION W/  INTRAOCULAR LENS IMPLANT Right 08/29/2017    Dr. Hong    CATARACT EXTRACTION W/   INTRAOCULAR LENS IMPLANT Left 02/24/2022    Dr. Hong    CAUDAL EPIDURAL STEROID INJECTION N/A 9/25/2024    Procedure: Caudal Epidural Steroid Injection;  Surgeon: Eze Byrd Jr., MD;  Location: [facility] PAIN MANAGEMENT;  Service: Pain Management;  Laterality: N/A;  @1100(prev. 715)  Eliquis last 9/21  Plavix last 9/19  Check BG  E-Consent    COLONOSCOPY N/A 2/21/2017    Procedure: COLONOSCOPY;  Surgeon: Robb Rosado MD;  Location: [facility] ENDO;  Service: Endoscopy;  Laterality: N/A;    COLONOSCOPY N/A 7/5/2023    Procedure: COLONOSCOPY;  Surgeon: Aston Varela MD;  Location: [facility] ENDO;  Service: Endoscopy;  Laterality: N/A;  Referral: JOVANNI SCANLON to hold Plavix 5 days and Eliquis 2 days per Dr Ford  [facility]  Suprep   Inst portal   [facility]    CORONARY ANGIOGRAPHY INCLUDING BYPASS GRAFTS WITH CATHETERIZATION OF LEFT HEART N/A 11/11/2020    Procedure: ANGIOGRAM, CORONARY, INCLUDING BYPASS GRAFT, WITH LEFT HEART CATHETERIZATION;  Surgeon: Lane Cuellar MD;  Location: [facility] CATH LAB;  Service: Cardiology;  Laterality: N/A;  RN PRE OP 11-5-2020. --COVID NEGATIVE ON  11-. C A    CORONARY ARTERY BYPASS GRAFT      March 2016    EPIDURAL STEROID INJECTION Bilateral 11/14/2018    Procedure: Lumbar Medial Branch Blocks;  Surgeon: Eze Byrd Jr., MD;  Location: [facility];  Service: Pain Management;  Laterality: Bilateral;  Bilateral Lumbar Medial Branch Blocks L2, L3, L4, L5    [id number]  [id number]    Arrive @ 1150; NO Sedation    EPIDURAL STEROID INJECTION Bilateral 11/28/2018    Procedure: Injection, Steroid, Epidural;  Surgeon: Eze Byrd Jr., MD;  Location: [facility];  Service: Pain Management;  Laterality: Bilateral;  Bilateral Sacroiliac Joint Steroid Injections    [id number]    Arrive @ 0910    EPIDURAL STEROID INJECTION Bilateral 3/20/2019    Procedure: Injection, Steroid, Sacroiliiac Joint;  Surgeon: Eze Byrd Jr., MD;  Location: [facility];  Service: Pain Management;   Laterality: Bilateral;  Bilateral Sacroiliac Joint Steroid Injections    [id number]    Arrive @ 1145; Trigger point injections also?    EPIDURAL STEROID INJECTION Right 2023    Procedure: Right L5 Transforaminal Epidural Steroid Injection;  Surgeon: Eze Byrd Jr., MD;  Location: [facility] ENDO;  Service: Pain Management;  Laterality: Right;  @0830 (given)  Eliquis last   Plavix last   Check BG    EPIDURAL STEROID INJECTION Right 10/11/2023    Procedure: Right L3 (infraneural) + S1 Transforaminal Epidural Steroid Injections;  Surgeon: Eze Byrd Jr., MD;  Location: [facility] ENDO;  Service: Pain Management;  Laterality: Right;  @0745 (requests morning)  Eliquis 10/7 & Plavix 10/5  Check BG    ESOPHAGOGASTRODUODENOSCOPY N/A 2023    Procedure: EGD (ESOPHAGOGASTRODUODENOSCOPY);  Surgeon: Anita Oswald MD;  Location: [facility] ENDO;  Service: Endoscopy;  Laterality: N/A;  Procedure Timin-4 weeks  Provider: Any GI provider  Location: No Preference  Additional Scheduling Information: Blood thinners  Prep Specifications:N/A   ref. by Dr. Light, instr. to portal, ,  meds-st  ok to hold Plavix 5 days and Eliquis 2 day    INTRAOCULAR PROSTHESES INSERTION Left 2022    Procedure: INSERTION, IOL PROSTHESIS;  Surgeon: Nickolas Hong MD;  Location: [facility] OR;  Service: Ophthalmology;  Laterality: Left;    PHACOEMULSIFICATION OF CATARACT Left 2022    Procedure: PHACOEMULSIFICATION, CATARACT;  Surgeon: Nickolas Hong MD;  Location: [facility] OR;  Service: Ophthalmology;  Laterality: Left;  RN Phone Pre Op 22.  Covid NEGATIVE  22.  Arrival 08:00 am.    TONSILLECTOMY         Social History     Socioeconomic History    Marital status:    Tobacco Use    Smoking status: Never     Passive exposure: Never    Smokeless tobacco: Never   Substance and Sexual Activity    Alcohol use: Not Currently    Drug use: No    Sexual activity: Yes     Partners: Female     Social  Drivers of Health     Financial Resource Strain: Low Risk  (1/22/2024)    Overall Financial Resource Strain (CARDIA)     Difficulty of Paying Living Expenses: Not hard at all   Food Insecurity: No Food Insecurity (1/22/2024)    Hunger Vital Sign     Worried About Running Out of Food in the Last Year: Never true     Ran Out of Food in the Last Year: Never true   Transportation Needs: No Transportation Needs (1/22/2024)    PRAPARE - Transportation     Lack of Transportation (Medical): No     Lack of Transportation (Non-Medical): No   Physical Activity: Insufficiently Active (1/22/2024)    Exercise Vital Sign     Days of Exercise per Week: 3 days     Minutes of Exercise per Session: 30 min   Stress: Stress Concern Present (1/22/2024)    [employer] - Occupational Stress Questionnaire     Feeling of Stress : To some extent   Housing Stability: Low Risk  (1/22/2024)    Housing Stability Vital Sign     Unable to Pay for Housing in the Last Year: No     Number of Places Lived in the Last Year: 1     Unstable Housing in the Last Year: No       Review of patient's allergies indicates:   Allergen Reactions    Penicillins Other (See Comments)     Unknown reaction, Had a reaction as a child    Shrimp Itching     Hand itching       Current Outpatient Medications on File Prior to Visit   Medication Sig Dispense Refill    albuterol (PROVENTIL/VENTOLIN HFA) 90 mcg/actuation inhaler Inhale 2 puffs into the lungs every 4 (four) hours as needed for Shortness of Breath. Rescue 17 g 3    ascorbic acid, vitamin C, (VITAMIN C) 100 MG tablet Take 100 mg by mouth daily as needed.      atorvastatin (LIPITOR) 80 MG tablet TAKE 1 TABLET EVERY EVENING 90 tablet 3    azelastine (ASTELIN) 137 mcg (0.1 %) nasal spray 1 spray (137 mcg total) by Nasal route 2 (two) times daily. 30 mL 3    b complex vitamins tablet Take 1 tablet by mouth once daily.      clopidogreL (PLAVIX) 75 mg tablet TAKE 1 TABLET ONE TIME DAILY 90  "tablet 3    coenzyme Q10 100 mg capsule Take 100 mg by mouth once daily.      dapagliflozin propanediol (FARXIGA) 5 mg Tab tablet Take 1 tablet (5 mg total) by mouth once daily. 90 tablet 3    ELIQUIS 5 mg Tab TAKE 1 TABLET TWICE DAILY 180 tablet 3    ergocalciferol (ERGOCALCIFEROL) 50,000 unit Cap TAKE 1 CAPSULE BY MOUTH WEEKLY 12 capsule 0    fenofibrate 160 MG Tab TAKE 1 TABLET EVERY MORNING 90 tablet 12    fluticasone propionate (FLONASE) 50 mcg/actuation nasal spray 2 sprays (100 mcg total) by Each Nostril route 2 (two) times daily. 18.2 mL 3    furosemide (LASIX) 20 MG tablet TAKE 1 TABLET TWICE DAILY 180 tablet 3    gabapentin (NEURONTIN) 300 MG capsule Take 2 capsules (600 mg total) by mouth 2 (two) times daily. 120 capsule 5    glimepiride (AMARYL) 1 MG tablet TAKE 1 TABLET BEFORE BREAKFAST 90 tablet 3    hydrALAZINE (APRESOLINE) 50 MG tablet TAKE 1 TABLET TWICE DAILY 180 tablet 3    insulin degludec (TRESIBA FLEXTOUCH U-100) 100 unit/mL (3 mL) insulin pen Inject 20 Units into the skin once daily. 30 mL 4    isosorbide mononitrate (IMDUR) 120 MG 24 hr tablet Take 1 tablet (120 mg total) by mouth once daily. 90 tablet 3    ketoconazole (NIZORAL) 2 % cream Apply topically once daily. 60 g 6    lancets Misc Check blood glucose 2x/day. True metrix. E11.65 200 each 3    magnesium 250 mg Tab Take 1 tablet by mouth once daily.       metFORMIN (GLUCOPHAGE-XR) 500 MG ER 24hr tablet Take 1 tablet with breakfast and 2 tablets with supper. 270 tablet 2    omega-3 acid ethyl esters (LOVAZA) 1 gram capsule Take 2 capsules (2 g total) by mouth 2 (two) times daily. 360 capsule 12    pantoprazole (PROTONIX) 40 MG tablet TAKE 1 TABLET EVERY DAY 90 tablet 3    pen needle, diabetic 32 gauge x 1/4" Ndle Use daily 200 each 3    semaglutide (OZEMPIC) 2 mg/dose (8 mg/3 mL) PnIj Inject 2 mg into the skin every 7 days. 4 each 3    triazolam (HALCION) 0.25 MG Tab TAKE 1 TABLET BY MOUTH EVERY NIGHT AT BEDTIME AS NEEDED 90 tablet 5    " BD ALCOHOL SWABS PadM       blood sugar diagnostic Strp Check blood glucose 2 times a day. True metrix. E11.65 200 strip 3    blood-glucose meter kit Test glucose 2-3x/day. True metrix 1 each 0    dexamethasone sodium phosp/PF (DEXAMETHASONE SODIUM PHOS, PF,) 10 mg/mL Soln       nitroGLYCERIN (NITROSTAT) 0.4 MG SL tablet Place 1 tablet (0.4 mg total) under the tongue every 5 (five) minutes as needed for Chest pain. 25 tablet 11    OMNIPAQUE 300 300 mg iodine/mL injection       [facility]PLUS LANCETS 33 gauge [facility]        Current Facility-Administered Medications on File Prior to Visit   Medication Dose Route Frequency Provider Last Rate Last Admin    cyclopentolate 1% ophthalmic solution 1 drop  1 drop Left Eye On Call Procedure Nickolas Hong MD   1 drop at 02/24/22 0901    ofloxacin 0.3 % ophthalmic solution 1 drop  1 drop Left Eye On Call Procedure Nickolas Hong MD   2 drop at 02/24/22 1017    sodium chloride 0.9% flush 10 mL  10 mL Intravenous PRN Nickolas Hong MD        triamcinolone acetonide injection 40 mg  40 mg Intra-articular  Valerie Olivera MD   40 mg at 02/09/23 1030       Objective:      BP (!) 152/71 (BP Location: Left arm, Patient Position: Sitting)   Pulse 64   SpO2 99%     Exam:  GEN:  Well developed, well nourished.  No acute distress.  Normal pain behavior.  HEENT:  No trauma.  Mucous membranes moist.  Nares patent bilaterally.  PSYCH: Normal affect. Thought content appropriate.  CHEST:  Breathing symmetric.  No audible wheezing.  ABD: Soft, non-distended.  SKIN:  Warm, pink, dry.  No rash on exposed areas.    EXT:  No cyanosis, clubbing, or edema.  No color change or changes in nail or hair growth.  NEURO/MUSCULOSKELETAL:  Fully alert, oriented, and appropriate. Speech normal ivania. No cranial nerve deficits.   Gait:  Antalgic.  No trendelenburg sign bilaterally.         Imaging:  Narrative & Impression  EXAMINATION:  MRI LUMBAR SPINE WITHOUT CONTRAST     CLINICAL  HISTORY:  Lumbar radiculopathy, symptoms persist with conservative treatment; Postlaminectomy syndrome, not elsewhere classified     TECHNIQUE:  Multiplanar, multisequence MR images were acquired from the thoracolumbar junction to the sacrum without the administration of contrast.     COMPARISON:  MRI lumbar spine without contrast 07/17/2023.     FINDINGS:  Postoperative change including right decompressive hemilaminectomy at L4-L5.     Alignment: Normal.     Vertebrae: Normal marrow signal. No fracture.     Discs: Mild multilevel disc space height loss and disc desiccation, similar from comparison imaging.     Cord: Normal.  Conus terminates at L1-L2.     Degenerative findings:     T12-L1: No significant spinal canal stenosis or neural foraminal narrowing.     L1-L2: No significant spinal canal stenosis or neural foraminal narrowing.     L2-L3: Disc bulge with mild bilateral facet joint arthropathy.  No significant spinal canal stenosis or neural foraminal narrowing.     L3-L4: Disc bulge and bilateral facet joint arthropathy contribute to moderate bilateral neural foraminal narrowing.  No significant spinal canal stenosis.     L4-L5: Postoperative change at this level.  Diffuse disc bulge with extension about the bilateral neural foramen with superimposed central disc extrusion and annular fissure causing mass effect on the bilateral lateral recesses.  Additional bilateral facet joint arthropathy contribute to moderate spinal canal stenosis as well as moderate right and severe left neural foraminal narrowing.     L5-S1: Disc bulge with superimposed central disc protrusion and annular fissure, as well as bilateral facet joint arthropathy contributing to severe bilateral neural foraminal narrowing.  No significant spinal canal stenosis.     Mild atrophy of the paraspinal musculature.     Sacroiliac joints are symmetric.     Partially visualized intra-abdominal organs are unremarkable.     Impression:      Postoperative change including right decompressive hemilaminectomy at L4-L5.     Similar multilevel lumbar spondylosis as detailed above, worst at L4-L5 contributing to moderate spinal canal stenosis as well as moderate to severe bilateral neural foraminal narrowing.     Additional severe bilateral neural foraminal narrowing at L5-S1.        Electronically signed by:Gumaro Weeks  Date:                                            10/28/2024  Time:                                           08:44  Narrative & Impression    EXAMINATION:  MRI LUMBAR SPINE WITHOUT CONTRAST     CLINICAL HISTORY:  Lumbar radiculopathy, symptoms persist with conservative treatment; Spondylosis without myelopathy or radiculopathy, lumbar region     TECHNIQUE:  Multiplanar, multisequence MR images were acquired from the thoracolumbar junction to the sacrum without the administration of contrast.     COMPARISON:  02/11/2019.     FINDINGS:  There is susceptibility weighted artifact within the midline lower lumbar spine, suggestive of prior instrumentation.     The lumbar alignment is within normal limits.  There are scattered Schmorl's nodes.  The vertebral body heights are maintained.  The bone marrow signal is within normal limits.     The conus terminates at the level of T12.  The cauda equina nerve roots are within normal limits.     There is multilevel disc desiccation.  Evaluation of the individual disc levels reveals the following:     L1-L2, there is a disc bulge along with facet hypertrophy and ligamentum flavum hypertrophy.  The spinal canal and neural foramina are unremarkable.     L2-L3, there is a disc bulge along with facet hypertrophy and ligamentum flavum hypertrophy.  The spinal canal and neural foramina are unremarkable There is small amount of fluid within the facet joints.     L3-L4, there is diffuse disc bulge along with facet hypertrophy and ligamentum flavum.  The spinal canal is within normal limits.  There is mild spinal  canal narrowing.     L4-L5, there is diffuse disc bulge along with facet hypertrophy and ligamentum flavum hypertrophy.  There is superimposed central disc protrusion.  There is marked narrowing of the lateral recesses.  There is mild to moderate narrowing the spinal canal.  There is moderate bilateral neural foraminal narrowing.     L5-S1, there is diffuse disc bulge along with facet hypertrophy and ligamentum flavum.  The spinal canal is within normal limits.  There is moderate neural foraminal narrowing.     The paraspinal soft tissues are unremarkable.     Impression:     Changes of prior instrumentation in the lower lumbar spine at the L4-L5 level.  Unchanged appearance of soft tissue at the prior disc site at the L4-L5 level.     Mild to moderate narrowing of the spinal canal at the L4-L5 level.     Mild-to-moderate neural foraminal narrowing the lower lumbar spine as above.        Electronically signed by: Kevin Erickson MD  Date:                                            07/18/2023  Time:                                           09:04       Narrative & Impression    EXAMINATION:  XR CERVICAL SPINE AP LAT WITH FLEX EXTEN     CLINICAL HISTORY:  Other cervical disc degeneration, unspecified cervical region     TECHNIQUE:  Three views of the cervical spine plus flexion and extension views were performed.     COMPARISON:  None     FINDINGS:  Cervical spine is normal in alignment, vertebral body heights are maintained.  No displaced fracture or subluxation.  No suspicious bone lesion.     No instability between the flexion and extension images.     Mild and moderate multilevel degenerative disc disease and uncovertebral DJD.  Anterior osteophytosis at levels C2-3, C4-5, and C5-6.  Mild multilevel facet DJD.     Unremarkable soft tissues.     Impression:     No acute abnormality.  No instability.     Mild and moderate multilevel DJD.        Electronically signed by: Marco Vásquez MD  Date:                                             08/18/2021  Time:                                           08:35           Narrative     EXAMINATION:  MRI LUMBAR SPINE WITHOUT CONTRAST    CLINICAL HISTORY:  lumbar ddd; Other intervertebral disc degeneration, lumbar region    TECHNIQUE:  Multiplanar, multisequence MR images were acquired from the thoracolumbar junction to the sacrum without the administration of contrast.    COMPARISON:  08/23/2017    FINDINGS:  There is no evidence of fracture or marrow replacement process.  There is disc desiccation at all lumbar levels with disc space narrowing at multiple levels but most significant at L4-5.  Sagittal alignment is maintained.  Conus terminates at T12-L1.  Visualized retroperitoneal structures demonstrate no significant abnormalities.    T12-L1, L1-L2: Mild diffuse disc bulge with no significant central canal stenosis or neural foraminal narrowing.    L2-L3: Mild diffuse disc bulge with moderate facet arthropathy.  No significant central canal stenosis or neural foraminal narrowing.    L3-4: Mild diffuse disc bulge extending into both neural foramen with moderate facet arthropathy causing moderate right and mild left neural foraminal narrowing.  No significant central canal stenosis.    L4-5: Hemilaminectomy defect on the right.  There is a diffuse disc bulge extending into both neural foramen with a superimposed central extrusion with an annular fissure extending slightly below the level of the disc plane.  This causes mass effect on the lateral recess and may impinge on the descending nerve roots although the canal is decompressed posteriorly by the laminectomy defect with no significant central canal stenosis.  There is severe facet arthropathy contributing to severe bilateral neural foraminal narrowing.    L5-S1: There is severe facet arthropathy.  There is a diffuse disc bulge with no significant central canal stenosis.  There is severe bilateral neural foraminal narrowing..   Impression        Postoperative changes at L4 on the right with decompression of the central canal.  There is lumbar spondylosis most significant at L4-5 and L5-S1 where there is severe bilateral neural foraminal narrowing.  Specific details at each level are discussed above.      Electronically signed by: Quinton Goins MD  Date: 02/12/2019  Time: 10:10         Assessment:       Encounter Diagnoses   Name Primary?    Degeneration of intervertebral disc of lumbar region with discogenic back pain and lower extremity pain Yes    Postlaminectomy syndrome of lumbar region     Lumbar radiculopathy     Lumbar spondylosis            Plan:       Driss was seen today for follow-up.    Diagnoses and all orders for this visit:    Degeneration of intervertebral disc of lumbar region with discogenic back pain and lower extremity pain    Postlaminectomy syndrome of lumbar region    Lumbar radiculopathy    Lumbar spondylosis            Driss Hernandez . is a 74 y.o. male with worsening chronic midline/bilateral low back pain.  No overt neurologic deficits noted on examination.  MRI with multilevel degenerative changes, most notable at L4-5 with diffuse disc bulge creating at least moderate spinal stenosis as well as severe bilateral foraminal narrowing.  Annular tear L4-5.  Also severe bilateral foraminal narrowing at L5-S1.  Mild improvement following right L5 TF ANNIE.  Significant improvement with right L3 infraneural, S1 TF ANNIE.  More recently with exacerbation of midline low back and b/l lower extremity pain.  I suspect that this might be related to acute intervertebral disc herniation and associated radiculitis/radiculopathy.  Moderate relief with caudal ANNIE.    Prior records reviewed.  Pertinent imaging studies reviewed by me. Imaging results were discussed with patient.  Discussed with the patient that the exact etiology of his pain is somewhat unclear.  Suspect the majority of his continued pain in his related to a disc abnormality  with associated radiculitis.  Suspect this is attributed to advanced degeneration at L4-5 and L5-S1.  Patient does also have a more focal midline lower back pain which may be attributed to a discogenic versus vertebral genic source.  He does appear to have significant degeneration of the disc L4-5 with annular tear.  Patient would be a good candidate for via disc although insurance likely a barrier to this.  Also this would only target his midline lower back pain when he does continue to express radicular symptoms.  Continue to follow up with Neurosurgery.  Patient does plan on undergoing spinal surgery in the near future.  Stressed the importance of maintaining regular home exercise program and being mindful of how they use their back throughout the day.  Patient expressed understanding and agreement.  Continue gabapentin.  Patient currently taking 600 mg q.h.s. with benefit, denies adverse effects.  Patient can increase to 600 mg b.i.d. as tolerated/needed.  Medication renally dosed.  Return to clinic as needed.    Grant German PA-C  [facility]  Interventional Pain Management   11/29/2024    This note was created by combination of typed  and M-Modal dictation.  Transcription and phonetic errors may be present.  If there are any questions, please contact me.

## 2024-12-03 DIAGNOSIS — M54.16 LUMBAR RADICULOPATHY: Primary | ICD-10-CM

## 2024-12-06 ENCOUNTER — OFFICE VISIT (OUTPATIENT)
Dept: OTOLARYNGOLOGY | Facility: CLINIC | Age: 74
End: 2024-12-06
Payer: MEDICARE

## 2024-12-06 ENCOUNTER — CLINICAL SUPPORT (OUTPATIENT)
Dept: AUDIOLOGY | Facility: CLINIC | Age: 74
End: 2024-12-06
Payer: MEDICARE

## 2024-12-06 VITALS
SYSTOLIC BLOOD PRESSURE: 128 MMHG | WEIGHT: 171.94 LBS | BODY MASS INDEX: 26.99 KG/M2 | HEIGHT: 67 IN | DIASTOLIC BLOOD PRESSURE: 62 MMHG

## 2024-12-06 DIAGNOSIS — H90.3 ASYMMETRICAL SENSORINEURAL HEARING LOSS: Primary | ICD-10-CM

## 2024-12-06 DIAGNOSIS — H61.23 BILATERAL IMPACTED CERUMEN: ICD-10-CM

## 2024-12-06 DIAGNOSIS — J30.2 SEASONAL ALLERGIC RHINITIS, UNSPECIFIED TRIGGER: ICD-10-CM

## 2024-12-06 DIAGNOSIS — J34.3 HYPERTROPHY OF INFERIOR NASAL TURBINATE: ICD-10-CM

## 2024-12-06 PROCEDURE — 1101F PT FALLS ASSESS-DOCD LE1/YR: CPT | Mod: CPTII,S$GLB,, | Performed by: OTOLARYNGOLOGY

## 2024-12-06 PROCEDURE — 3288F FALL RISK ASSESSMENT DOCD: CPT | Mod: CPTII,S$GLB,, | Performed by: OTOLARYNGOLOGY

## 2024-12-06 PROCEDURE — 3061F NEG MICROALBUMINURIA REV: CPT | Mod: CPTII,S$GLB,, | Performed by: OTOLARYNGOLOGY

## 2024-12-06 PROCEDURE — 1126F AMNT PAIN NOTED NONE PRSNT: CPT | Mod: CPTII,S$GLB,, | Performed by: OTOLARYNGOLOGY

## 2024-12-06 PROCEDURE — 3078F DIAST BP <80 MM HG: CPT | Mod: CPTII,S$GLB,, | Performed by: OTOLARYNGOLOGY

## 2024-12-06 PROCEDURE — G0268 REMOVAL OF IMPACTED WAX MD: HCPCS | Mod: S$GLB,,, | Performed by: OTOLARYNGOLOGY

## 2024-12-06 PROCEDURE — 3074F SYST BP LT 130 MM HG: CPT | Mod: CPTII,S$GLB,, | Performed by: OTOLARYNGOLOGY

## 2024-12-06 PROCEDURE — 3044F HG A1C LEVEL LT 7.0%: CPT | Mod: CPTII,S$GLB,, | Performed by: OTOLARYNGOLOGY

## 2024-12-06 PROCEDURE — 3066F NEPHROPATHY DOC TX: CPT | Mod: CPTII,S$GLB,, | Performed by: OTOLARYNGOLOGY

## 2024-12-06 PROCEDURE — 99214 OFFICE O/P EST MOD 30 MIN: CPT | Mod: 25,S$GLB,, | Performed by: OTOLARYNGOLOGY

## 2024-12-06 NOTE — PROGRESS NOTES
OTOLARYNGOLOGY CLINIC NOTE  Date:  12/06/2024     Chief complaint:  Chief Complaint   Patient presents with    Cerumen Impaction     bilateral       History of Present Illness  Driss Hernandez Jr. is a 74 y.o. male  presenting today for a followup.  Tinnitus does not seem worse   No ringing in the left ear  Had a room spinning     Room spinning was before last visit would spin lasted seconds to minutes ; had got really sick lasted for 2 days and went on for hours difficult to clarify room spinning time frame has not come back     I last saw the patient on 1-25 - 24. Below text is copied from  note on that date describing history of present illness at that time :  Thought he was hring getting worse but has aconstant hissing in the right ear and very high pitched   First happened a couple of months ago and when it itinitally happened it was reaelly   Having harder time hearing his wife sincve that time as well. If lays on left side can't hear as well  Has been using nasal sprays had trouble with one of them but not sure which one     I last saw the patient on 8-18 - 23. Here for 6 month follow up with repeat audio. Last visti had air bone gap and tuning forks AC> BC. Lateralized to right  but no effusion or cholesteatoma  Below text is copied from  note on that date describing history of present illness at that time :  Hearing loss.  Removed wax on right side with a nano pin   Unsure if hearing has improved since wax came out      Had some congestion a few weeks ago in his nose but he thinks he may have had covid. Normally his left ear bothers him more than the right ear.   Hearing feels muffled on right   No ear pain   No otorrhea. Sometimes ears itch   Left ear typically is the one that has popping sensations    Past Medical History  Past Medical History:   Diagnosis Date    Chronic heart failure with preserved ejection fraction 2/9/2023    Chronic midline low back pain without sciatica 10/2/2017    Colon  polyps     Coronary artery disease involving native coronary artery of native heart 3/5/2020    Coronary artery disease involving native coronary artery of native heart with angina pectoris 12/10/2020    Diabetes mellitus with neurological manifestations, uncontrolled 1/24/2017    Diabetic polyneuropathy associated with type 2 diabetes mellitus 1/24/2017    Diabetic polyneuropathy associated with type 2 diabetes mellitus     Essential hypertension 1/24/2017    Gastroesophageal reflux disease 1/24/2017    Hyperlipidemia 1/24/2017    Insomnia 1/24/2017    Long-term insulin use 1/24/2017    Longstanding persistent atrial fibrillation 12/30/2020    Lumbar spondylosis 11/13/2017    Nuclear sclerosis of both eyes 8/24/2017    Obesity     PAD (peripheral artery disease) 3/23/2022    Stage 3 chronic kidney disease 2/13/2019    Uncontrolled type 2 diabetes mellitus without complication, with long-term current use of insulin 1/24/2017        Past Surgical History  Past Surgical History:   Procedure Laterality Date    ARTHROSCOPIC REPAIR OF ROTATOR CUFF OF SHOULDER Right 7/5/2022    Procedure: REPAIR, ROTATOR CUFF, ARTHROSCOPIC;  Surgeon: Valerie Olivera MD;  Location: [facility] OR;  Service: Orthopedics;  Laterality: Right;  GUADALUPE AND NEPHJENNIFER LEMUS [phone]/ TEXTED ALLAN ON 6/23/2022 @ 9:47AM. ALLAN RESPONDED ON 6/23/2022 @ 10:33AM-  JEAN PAUL TALYA [phone] MY BE HERE FOR CASE SPOKE TO HIM @ 9:59AM ON 7-1-2022  RN PREOP 6/28/2022    BACK SURGERY      2002    CATARACT EXTRACTION W/  INTRAOCULAR LENS IMPLANT Right 08/29/2017    Dr. Hong    CATARACT EXTRACTION W/  INTRAOCULAR LENS IMPLANT Left 02/24/2022    Dr. Hong    CAUDAL EPIDURAL STEROID INJECTION N/A 9/25/2024    Procedure: Caudal Epidural Steroid Injection;  Surgeon: Eze Byrd Jr., MD;  Location: [facility] PAIN MANAGEMENT;  Service: Pain Management;  Laterality: N/A;  @1100(prev. 715)  Eliquis last 9/21  Plavix last 9/19  Check BG  E-Consent     COLONOSCOPY N/A 2/21/2017    Procedure: COLONOSCOPY;  Surgeon: Robb Rosado MD;  Location: [facility] ENDO;  Service: Endoscopy;  Laterality: N/A;    COLONOSCOPY N/A 7/5/2023    Procedure: COLONOSCOPY;  Surgeon: Aston Varela MD;  Location: [facility] ENDO;  Service: Endoscopy;  Laterality: N/A;  Referral: BETHELANDRESPATRICJOVANNI  ok to hold Plavix 5 days and Eliquis 2 days per Dr Purdy-OFELIA   Meds  Suprep   Inst portal   LW    CORONARY ANGIOGRAPHY INCLUDING BYPASS GRAFTS WITH CATHETERIZATION OF LEFT HEART N/A 11/11/2020    Procedure: ANGIOGRAM, CORONARY, INCLUDING BYPASS GRAFT, WITH LEFT HEART CATHETERIZATION;  Surgeon: Lane Cuellar MD;  Location: [facility] CATH LAB;  Service: Cardiology;  Laterality: N/A;  RN PRE OP 11-5-2020. --COVID NEGATIVE ON  11-. C A    CORONARY ARTERY BYPASS GRAFT      March 2016    EPIDURAL STEROID INJECTION Bilateral 11/14/2018    Procedure: Lumbar Medial Branch Blocks;  Surgeon: Eze Byrd Jr., MD;  Location: [facility];  Service: Pain Management;  Laterality: Bilateral;  Bilateral Lumbar Medial Branch Blocks L2, L3, L4, L5    20985  [id number]    Arrive @ 1150; NO Sedation    EPIDURAL STEROID INJECTION Bilateral 11/28/2018    Procedure: Injection, Steroid, Epidural;  Surgeon: Eze Byrd Jr., MD;  Location: [facility];  Service: Pain Management;  Laterality: Bilateral;  Bilateral Sacroiliac Joint Steroid Injections    05235    Arrive @ 0910    EPIDURAL STEROID INJECTION Bilateral 3/20/2019    Procedure: Injection, Steroid, Sacroiliiac Joint;  Surgeon: Eze Byrd Jr., MD;  Location: [facility];  Service: Pain Management;  Laterality: Bilateral;  Bilateral Sacroiliac Joint Steroid Injections    71753    Arrive @ 1145; Trigger point injections also?    EPIDURAL STEROID INJECTION Right 8/2/2023    Procedure: Right L5 Transforaminal Epidural Steroid Injection;  Surgeon: Eze Byrd Jr., MD;  Location: [facility];  Service: Pain Management;  Laterality: Right;  @0830  (given)  Eliquis last   Plavix last   Check BG    EPIDURAL STEROID INJECTION Right 10/11/2023    Procedure: Right L3 (infraneural) + S1 Transforaminal Epidural Steroid Injections;  Surgeon: Eze Byrd Jr., MD;  Location: [facility] ENDO;  Service: Pain Management;  Laterality: Right;  @0745 (requests morning)  Eliquis 10/7 & Plavix 10/5  Check BG    ESOPHAGOGASTRODUODENOSCOPY N/A 2023    Procedure: EGD (ESOPHAGOGASTRODUODENOSCOPY);  Surgeon: Anita Oswald MD;  Location: [facility] ENDO;  Service: Endoscopy;  Laterality: N/A;  Procedure Timin-4 weeks  Provider: Any GI provider  Location: No Preference  Additional Scheduling Information: Blood thinners  Prep Specifications:N/A   ref. by Dr. Light, instr. to portal, , [facility]  ok to hold Plavix 5 days and Eliquis 2 day    INTRAOCULAR PROSTHESES INSERTION Left 2022    Procedure: INSERTION, IOL PROSTHESIS;  Surgeon: Nickolas Hong MD;  Location: [facility] OR;  Service: Ophthalmology;  Laterality: Left;    PHACOEMULSIFICATION OF CATARACT Left 2022    Procedure: PHACOEMULSIFICATION, CATARACT;  Surgeon: Nickolas Hong MD;  Location: [facility] OR;  Service: Ophthalmology;  Laterality: Left;  RN Phone Pre Op 22.  Covid NEGATIVE  22.  Arrival 08:00 am.    TONSILLECTOMY          Medications  Current Outpatient Medications on File Prior to Visit   Medication Sig Dispense Refill    albuterol (PROVENTIL/VENTOLIN HFA) 90 mcg/actuation inhaler Inhale 2 puffs into the lungs every 4 (four) hours as needed for Shortness of Breath. Rescue 17 g 3    ascorbic acid, vitamin C, (VITAMIN C) 100 MG tablet Take 100 mg by mouth daily as needed.      atorvastatin (LIPITOR) 80 MG tablet TAKE 1 TABLET EVERY EVENING 90 tablet 3    azelastine (ASTELIN) 137 mcg (0.1 %) nasal spray 1 spray (137 mcg total) by Nasal route 2 (two) times daily. 30 mL 3    b complex vitamins tablet Take 1 tablet by mouth once daily.      clopidogreL (PLAVIX) 75 mg  "tablet TAKE 1 TABLET ONE TIME DAILY 90 tablet 3    coenzyme Q10 100 mg capsule Take 100 mg by mouth once daily.      dapagliflozin propanediol (FARXIGA) 5 mg Tab tablet Take 1 tablet (5 mg total) by mouth once daily. 90 tablet 3    ELIQUIS 5 mg Tab TAKE 1 TABLET TWICE DAILY 180 tablet 3    ergocalciferol (ERGOCALCIFEROL) 50,000 unit Cap TAKE 1 CAPSULE BY MOUTH WEEKLY 12 capsule 0    fenofibrate 160 MG Tab TAKE 1 TABLET EVERY MORNING 90 tablet 12    fluticasone propionate (FLONASE) 50 mcg/actuation nasal spray 2 sprays (100 mcg total) by Each Nostril route 2 (two) times daily. 18.2 mL 3    furosemide (LASIX) 20 MG tablet TAKE 1 TABLET TWICE DAILY 180 tablet 3    gabapentin (NEURONTIN) 300 MG capsule Take 2 capsules (600 mg total) by mouth 2 (two) times daily. 120 capsule 5    glimepiride (AMARYL) 1 MG tablet TAKE 1 TABLET BEFORE BREAKFAST 90 tablet 3    hydrALAZINE (APRESOLINE) 50 MG tablet TAKE 1 TABLET TWICE DAILY 180 tablet 3    insulin degludec (TRESIBA FLEXTOUCH U-100) 100 unit/mL (3 mL) insulin pen Inject 20 Units into the skin once daily. 30 mL 4    isosorbide mononitrate (IMDUR) 120 MG 24 hr tablet Take 1 tablet (120 mg total) by mouth once daily. 90 tablet 3    ketoconazole (NIZORAL) 2 % cream Apply topically once daily. 60 g 6    lancets Misc Check blood glucose 2x/day. True metrix. E11.65 200 each 3    magnesium 250 mg Tab Take 1 tablet by mouth once daily.       metFORMIN (GLUCOPHAGE-XR) 500 MG ER 24hr tablet Take 1 tablet with breakfast and 2 tablets with supper. 270 tablet 2    omega-3 acid ethyl esters (LOVAZA) 1 gram capsule Take 2 capsules (2 g total) by mouth 2 (two) times daily. 360 capsule 12    pantoprazole (PROTONIX) 40 MG tablet TAKE 1 TABLET EVERY DAY 90 tablet 3    pen needle, diabetic 32 gauge x 1/4" Ndle Use daily 200 each 3    semaglutide (OZEMPIC) 2 mg/dose (8 mg/3 mL) PnIj Inject 2 mg into the skin every 7 days. 4 each 3    triazolam (HALCION) 0.25 MG Tab TAKE 1 TABLET BY MOUTH EVERY " NIGHT AT BEDTIME AS NEEDED 90 tablet 5    BD ALCOHOL SWABS PadM       blood sugar diagnostic Strp Check blood glucose 2 times a day. True metrix. E11.65 200 strip 3    blood-glucose meter kit Test glucose 2-3x/day. True metrix 1 each 0    dexamethasone sodium phosp/PF (DEXAMETHASONE SODIUM PHOS, PF,) 10 mg/mL Soln       nitroGLYCERIN (NITROSTAT) 0.4 MG SL tablet Place 1 tablet (0.4 mg total) under the tongue every 5 (five) minutes as needed for Chest pain. 25 tablet 11    OMNIPAQUE 300 300 mg iodine/mL injection       TRUEPLUS LANCETS 33 gauge [facility]        Current Facility-Administered Medications on File Prior to Visit   Medication Dose Route Frequency Provider Last Rate Last Admin    cyclopentolate 1% ophthalmic solution 1 drop  1 drop Left Eye On Call Procedure Nickolas Hong MD   1 drop at 02/24/22 0901    ofloxacin 0.3 % ophthalmic solution 1 drop  1 drop Left Eye On Call Procedure Nickolas Hong MD   2 drop at 02/24/22 1017    sodium chloride 0.9% flush 10 mL  10 mL Intravenous PRN Nickolas Hong MD        triamcinolone acetonide injection 40 mg  40 mg Intra-articular  Valerie Olivera MD   40 mg at 02/09/23 1030       Review of Systems  Review of Systems   Constitutional:  Positive for malaise/fatigue.   HENT:  Positive for ear pain and hearing loss.    Eyes: Negative.    Respiratory: Negative.     Cardiovascular:  Positive for chest pain.   Gastrointestinal:  Positive for diarrhea.   Musculoskeletal:  Positive for back pain and neck pain.   Skin: Negative.    Neurological: Negative.    Endo/Heme/Allergies:  Bruises/bleeds easily.   Psychiatric/Behavioral:  Positive for depression.         Answers submitted by the patient for this visit:  Review of Symptoms Questionnaire  (Submitted on 12/3/2024)  sinus pressure : Yes  Foot swelling?: Yes  Muscle aches / pain?: Yes  Food Allergies?: Yes  decreased concentration: Yes  sleep disturbance: Yes  Social History   reports that he has  "never smoked. He has never been exposed to tobacco smoke. He has never used smokeless tobacco. He reports that he does not currently use alcohol. He reports that he does not use drugs.     Family History  Family History   Problem Relation Name Age of Onset    Depression Mother      Heart disease Mother      Stroke Father      No Known Problems Sister Elaine     Diabetes Sister Dilcia     No Known Problems Sister Idalia     Blindness Brother Burt     No Known Problems Maternal Aunt      No Known Problems Maternal Uncle      No Known Problems Paternal Aunt      No Known Problems Paternal Uncle      No Known Problems Maternal Grandmother      No Known Problems Maternal Grandfather      No Known Problems Paternal Grandmother      No Known Problems Paternal Grandfather      Amblyopia Neg Hx      Cancer Neg Hx      Cataracts Neg Hx      Glaucoma Neg Hx      Hypertension Neg Hx      Macular degeneration Neg Hx      Retinal detachment Neg Hx      Strabismus Neg Hx      Thyroid disease Neg Hx          Physical Exam   Vitals:    12/06/24 1351   BP: 128/62    Body mass index is 26.93 kg/m².  Weight: 78 kg (171 lb 15.3 oz)   Height: 5' 7" (170.2 cm)     GENERAL: no acute distress.  HEAD: normocephalic.   EYES: No scleral icterus  EARS: external ear without lesion, normal pinna shape and position.  External auditory canal with bilateral cerumen impaction   NOSE: external nose without significant bony abnormality, turbinate hypertrophy   ORAL CAVITY/OROPHARYNX: tongue mobile.   NECK: trachea midline.   LYMPH NODES:No cervical lymphadenopathy.  RESPIRATORY: no stridor, no stertor. Voice normal. Respirations nonlabored.  NEURO: alert, responds to questions appropriately.    PSYCH:mood appropriate    Procedure Note   Procedure performed:microscopic examination of ears with cerumen disimpaction    Indication for procedure: bilateral cerumen impaction     Description of procedure:  After verbal consent was obtained, the patient was " positioned in semi recumbent position and speculum was placed in the right ear and microscope brought into the field.  The microscope was positioned and magnification adjusted for appropriate visualization. Otologic instruments including various size otologic suctions and curette were used to remove the cerumen from the right external auditory canals under visualization with the operating microscope. After cleaning, visualization was again performed and at various levels of higher magnification to optimize views of the ear canal, tympanic membrane, ossicles and middle ear space. The same procedure was then repeated for the left ear. Findings as indicated below. All portions of the procedure and examination by otomicroscopy were tolerated well without complication.     Findings:  Right ear: Complete cerumen impaction removed entirely revealing normal external auditory canal; tympanic membrane without bulging, retraction, or perforation; no evidence of middle ear fluid or effusion.   Left ear: Complete cerumen impaction removed entirely revealing normal external auditory canal; tympanic membrane without bulging, retraction, or perforation; no evidence of middle ear fluid or effusion.       AUDIOLOGY RESULTS  Audiometric evaluation including audiogram, tympanometry, acoustic reflexes, and speech discrimination which was performed  was personally reviewed and interpreted.  Notable findings on the audiogram were asymmetric sensorineural hearing loss (SNHL). Left with snhl started at 3-4 lhzwith moderate high freq snhl. Right ear with normal sloping to mid snhl at 3 khzand then moderate snhl  Tympanometry revealed Type A tympanogram on the left and Type A tympanogram on the right. Speech discrimination was 96%  on the left, and 96% on the right.     Report of the audiologist performing this audiometric testing was also reviewed   Imaging:  The patient does not have any new imaging of the head and neck since last visit.      Labs:  CBC  Recent Labs   Lab 06/16/23  0953 06/24/23 2118 09/05/24  0920   WBC 5.42 7.09 5.67   Hemoglobin 11.7 L 10.8 L 15.2   Hematocrit 41.4 37.0 L 50.6   MCV 69 L 69 L 84   Platelets 308 245 226     BMP  Recent Labs   Lab 06/16/23  0953 06/24/23 2118 07/24/24  0735 09/05/24  0920   Glucose 112 H 81  --  91   Sodium 140 140  --  141   Potassium 4.6 4.2  --  5.1   Chloride 101 107  --  102   CO2 29 23  --  26   BUN 27 H 25 H  --  30 H   Creatinine 1.5 H 1.4 1.6 H 1.6 H   Calcium 10.3 9.4  --  10.3     COAGS  Recent Labs   Lab 06/24/23 2217   INR 1.1       Assessment  1. Asymmetrical sensorineural hearing loss  - MRI IAC/Temporal Bones W W/O Contrast; Future  - CREATININE, SERUM; Future    2. Bilateral impacted cerumen    3. Seasonal allergic rhinitis, unspecified trigger    4. Hypertrophy of inferior nasal turbinate       Plan:  Discussed plan of care with patient in detail and all questions answered. Patient reported understanding of plan of care. I gave the patient the opportunity to ask questions and patient confirmed all questions answered to satisfaction.     Cleaned left ear in 2023    Saline and flonase and asltein   Can do debrox prn   Reviewed hearing test   Mri iac for asym snhl - discussed about possibility of acoustic neuroma, discussed potential lessened risk of dementia with hearing aid s  F/u 1 year , sooner if issue. Discussed with patient to message me for results of mri . If no iac pathology check with [employer] to see if has hearing aid benefit       Please be aware that this note has been generated with the assistance of [employer] voice-to-text.  Please excuse any spelling or grammatical errors.

## 2024-12-06 NOTE — PROGRESS NOTES
Please click on link to view Audiogram:  Document on 12/6/2024 2:17 PM by Buffy Cornelius AU.D: Audiogram    Mr. Driss Hernandez Jr., a 74 y.o. male, was seen in the clinic today for an audiological evaluation. Previous audiogram from 1/25/24 indicated a mild to moderate essentially high frequency sensorineural hearing loss (SNHL) bilaterally, slightly worse for the right ear.     Otoscopy clear bilaterally. Tympanometry testing revealed a Type A tympanogram for the right ear and a Type A tympanogram for the left ear. Ipsilateral acoustic reflexes were present at 500-1000 Hz for the right ear and were absent at all tested frequencies for the left ear.    Audiological testing revealed a mild to moderate high frequency SNHL bilaterally, slightly worse for the right ear. A speech reception threshold was obtained at 25 dBHL for the right ear and at 20 dBHL for the left ear. Speech discrimination was 96% for the right ear and 96% for the left ear.      Recommendations:  1. Otologic evaluation  2. Annual audiological evaluation, sooner if medically necessary or if hearing changes  3. Hearing protection when in noise   4. Utilize ReSound Relief imtiaz and other tinnitus management strategies discussed during appointment (lower salt/caffeine intake, monitor stress/anxiety levels, soft background noise in quiet)  5. Hearing aid consultation following medical clearance if Mr. Hernandez feels hearing loss negatively impacts quality of life

## 2024-12-07 DIAGNOSIS — E55.9 HYPOVITAMINOSIS D: ICD-10-CM

## 2024-12-10 ENCOUNTER — TELEPHONE (OUTPATIENT)
Dept: PHARMACY | Facility: CLINIC | Age: 74
End: 2024-12-10
Payer: MEDICARE

## 2024-12-10 RX ORDER — ERGOCALCIFEROL 1.25 MG/1
CAPSULE ORAL
Qty: 12 CAPSULE | Refills: 0 | Status: SHIPPED | OUTPATIENT
Start: 2024-12-10

## 2024-12-10 NOTE — TELEPHONE ENCOUNTER
A AZ&ME(Farxiga), Coni Nordisk(Tresiba, Ozempic & [place]) Renewal Patient Assistance Application for   - MRN   was faxed to your office @ [phone].  Please have Dr. Jono Willoughby review the application to ensure the prescription is correct. If correct, sign and fax the application back to the Pharmacy Patient Assistance Team @[phone]. Do not fax to the Manufacture Program    Please do not write any corrections on this application.  If changes are needed, let me know ASAP and the corrected application will be re-faxed to your office for Provider signature.      Sherrie Lindsay

## 2024-12-10 NOTE — TELEPHONE ENCOUNTER
Patient has provided the necessary documents to begin the enrollment process into the Az&Me and Coni Nordisk program. The prescription portion of the Farxiga, Ozempic 2mg, Pen Needles, and Tresiba Pen application has been sent to the providers office for approval and signature.       Sherrie Lindsay  Pharmacy Patient Assistance Team

## 2024-12-10 NOTE — TELEPHONE ENCOUNTER
We have reached out to Driss Hernandez Jr. to inform him of the Az&Me and Coni Nordisk Patient Assistance Program re-enrollment process for Farxiga, Ozempic 2mg, Pen Needles, and Tresiba Pen. Patient must provided the following documentation to re-apply for 2025: Proof of household Income( such as social security statement, 1099 form, pension statement or 3 consecutive pay stubs and Copy of all Insurance cards( front and back)        Sherrie Lindsay  Pharmacy Patient Assistance

## 2024-12-11 ENCOUNTER — OFFICE VISIT (OUTPATIENT)
Dept: FAMILY MEDICINE | Facility: CLINIC | Age: 74
End: 2024-12-11
Payer: MEDICARE

## 2024-12-11 ENCOUNTER — PATIENT MESSAGE (OUTPATIENT)
Dept: PHARMACY | Facility: CLINIC | Age: 74
End: 2024-12-11
Payer: MEDICARE

## 2024-12-11 ENCOUNTER — TELEPHONE (OUTPATIENT)
Dept: NEUROSURGERY | Facility: CLINIC | Age: 74
End: 2024-12-11
Payer: MEDICARE

## 2024-12-11 VITALS
SYSTOLIC BLOOD PRESSURE: 138 MMHG | HEART RATE: 58 BPM | TEMPERATURE: 98 F | WEIGHT: 173.75 LBS | DIASTOLIC BLOOD PRESSURE: 64 MMHG | BODY MASS INDEX: 27.27 KG/M2 | OXYGEN SATURATION: 99 % | HEIGHT: 67 IN

## 2024-12-11 DIAGNOSIS — D50.9 IRON DEFICIENCY ANEMIA, UNSPECIFIED IRON DEFICIENCY ANEMIA TYPE: ICD-10-CM

## 2024-12-11 DIAGNOSIS — N18.31 STAGE 3A CHRONIC KIDNEY DISEASE: ICD-10-CM

## 2024-12-11 DIAGNOSIS — F13.20 SEDATIVE DEPENDENCE: ICD-10-CM

## 2024-12-11 DIAGNOSIS — Z79.4 TYPE 2 DIABETES MELLITUS WITH HYPERGLYCEMIA, WITH LONG-TERM CURRENT USE OF INSULIN: Primary | ICD-10-CM

## 2024-12-11 DIAGNOSIS — I10 ESSENTIAL HYPERTENSION: ICD-10-CM

## 2024-12-11 DIAGNOSIS — R42 VERTIGO: ICD-10-CM

## 2024-12-11 DIAGNOSIS — Z79.4 LONG-TERM INSULIN USE: ICD-10-CM

## 2024-12-11 DIAGNOSIS — G47.00 INSOMNIA, UNSPECIFIED TYPE: ICD-10-CM

## 2024-12-11 DIAGNOSIS — E11.65 TYPE 2 DIABETES MELLITUS WITH HYPERGLYCEMIA, WITH LONG-TERM CURRENT USE OF INSULIN: Primary | ICD-10-CM

## 2024-12-11 DIAGNOSIS — Z63.79 STRESS DUE TO ILLNESS OF FAMILY MEMBER: ICD-10-CM

## 2024-12-11 DIAGNOSIS — E11.42 DIABETIC POLYNEUROPATHY ASSOCIATED WITH TYPE 2 DIABETES MELLITUS: ICD-10-CM

## 2024-12-11 PROCEDURE — 99999 PR PBB SHADOW E&M-EST. PATIENT-LVL V: CPT | Mod: PBBFAC,,, | Performed by: INTERNAL MEDICINE

## 2024-12-11 PROCEDURE — 1101F PT FALLS ASSESS-DOCD LE1/YR: CPT | Mod: CPTII,S$GLB,, | Performed by: INTERNAL MEDICINE

## 2024-12-11 PROCEDURE — 3078F DIAST BP <80 MM HG: CPT | Mod: CPTII,S$GLB,, | Performed by: INTERNAL MEDICINE

## 2024-12-11 PROCEDURE — 3075F SYST BP GE 130 - 139MM HG: CPT | Mod: CPTII,S$GLB,, | Performed by: INTERNAL MEDICINE

## 2024-12-11 PROCEDURE — 3066F NEPHROPATHY DOC TX: CPT | Mod: CPTII,S$GLB,, | Performed by: INTERNAL MEDICINE

## 2024-12-11 PROCEDURE — 1159F MED LIST DOCD IN RCRD: CPT | Mod: CPTII,S$GLB,, | Performed by: INTERNAL MEDICINE

## 2024-12-11 PROCEDURE — 1125F AMNT PAIN NOTED PAIN PRSNT: CPT | Mod: CPTII,S$GLB,, | Performed by: INTERNAL MEDICINE

## 2024-12-11 PROCEDURE — 3044F HG A1C LEVEL LT 7.0%: CPT | Mod: CPTII,S$GLB,, | Performed by: INTERNAL MEDICINE

## 2024-12-11 PROCEDURE — 99215 OFFICE O/P EST HI 40 MIN: CPT | Mod: S$GLB,,, | Performed by: INTERNAL MEDICINE

## 2024-12-11 PROCEDURE — 3288F FALL RISK ASSESSMENT DOCD: CPT | Mod: CPTII,S$GLB,, | Performed by: INTERNAL MEDICINE

## 2024-12-11 PROCEDURE — 3061F NEG MICROALBUMINURIA REV: CPT | Mod: CPTII,S$GLB,, | Performed by: INTERNAL MEDICINE

## 2024-12-11 PROCEDURE — G2211 COMPLEX E/M VISIT ADD ON: HCPCS | Mod: S$GLB,,, | Performed by: INTERNAL MEDICINE

## 2024-12-11 RX ORDER — MECLIZINE HYDROCHLORIDE 25 MG/1
25 TABLET ORAL 3 TIMES DAILY PRN
Qty: 60 TABLET | Refills: 12 | Status: SHIPPED | OUTPATIENT
Start: 2024-12-11

## 2024-12-11 NOTE — PROGRESS NOTES
Chief complaint:  Discussed diabetes and low sugars, vertigo       Physical exam 9/24    74-year-old white male.  Patient followed by Endocrine nurse practitioner.  In order for him to get off mealtime insulin it appears they put him on Amaryl 1 mg in addition to his Ozempic 2 mg.  He was eating less and taking his Amaryl in the evening and having some low sugars in the evening.  He has increased his started to take slightly in his had no more low sugars.  We discussed that if indeed it is a recurrent problem is clearly the Amaryl he may need to discontinue.  He continues on the Ozempic and his Tresiba.    Also reports a remote history years ago of some vertigo he got up and looked up in the room was spinning and he had acute nausea and vomiting for two days.  For the past three days when he goes to get up and turns his head he will get some vertigo type symptoms although not as severe.  He has had some ringing in his ears and so does have an MRI scheduled and has seen ENT.  We discussed the importance of following through on that is since he has some new tinnitus and vertigo and unclear if he has hearing loss but I would imagine he would.  We discussed suppression with meclizine.  He also is working at his large having to get up on high ladders and advised him against that and until vertigo is quiet he should avoid getting on ladders, roofs and so forth.      Regarding diabetes he was managed by outside Endocrine.  His A1c has been uncontrolled chronically -8.4 in may, 7.7, 8.2, 6.2 , 6.9, 7.4, 6.6, 6.4, 6.2, 6.5 July .  We discussed diet improvement.  He has chosen to use regular insulin due to cost.  We discussed that it may well need to be taken 30 min before the meal and could last for hours and he was not aware of that.  He can discuss in greater detail with his endocrinologist.  Continues on tresiba 20  He is also on a weekly injection.  He was thinking about stopping it but encouraged him to do so as it may  well be helping with his appetite over eating.    Discussed his medications that will cause glucose loss of urine and he will need to supplement fluid intake for what increased output he may have otherwise he can worsen renal insufficiency.  He does not like drinking water but will do so and discussed other water alternatives.    Apparently on a weekly injection and was discontinued due to the high triglycerides but I pointed out that is triglyceride elevation has been something that has fluctuated over the years and more so relates to diet and his uncontrolled diabetes.  He is now on fish oil as well as another pill for the high triglycerides.  He is tolerating it with  continued reflux and we discussed trying a different preparation.  He has already put them in the freezer..  He will need reassessment in a couple of months.    Other issues to discuss his wife's memory.  She is a patient of mine and I saw her yesterday and so we did discussed and it does not appear that she is having any signs or symptoms of dementia.  May need to get her hearing tested.  Counseled regarding all these issues at length today. Over 70 min  minutes of total time spent on the encounter, which includes face to face time and non-face to face time preparing to see the patient (eg, review of tests), Obtaining and/or reviewing separately obtained history, Documenting clinical information in the electronic or other health record, Independently interpreting results (not separately reported) and communicating results to the patient/family/caregiver, or Care coordination (not separately reported).    ROS:   CONST: weight stable. EYES: no vision change. ENT: no sore throat. CV: no chest pain w/ exertion. RESP: no shortness of breath. GI: no nausea, vomiting, diarrhea. No dysphagia. : no urinary issues. MUSCULOSKELETAL: no new myalgias or arthralgias. SKIN: no new changes. NEURO: no focal deficits. PSYCH: no new issues. ENDOCRINE: no polyuria.  HEME: no lymph nodes. ALLERGY: no general pruritis.      Past medical history:  1.  Uncontrolled diabetes with neuropathy, followed by Dr. Agrawal  2.  Coronary artery disease with triple bypass March 2016, followed by the heart clinic. Now [facility], neg PET 2017, Nuc/echo neg 3/20,  3.  Back surgery 2002.  5.  Hyperlipidemia.  6.  Hypertension.  7.   colonoscopy 3 polyps 2/17 -5 polyps 7/23- 5 yrs  8.  Pneumovax and Prevnar given 2015 according to records  9.  Right leg radiculopathy, confirmed by MRI, did respond epidural with [facility] pain management  10. Partial small-bowel obstruction October 2018  11.  Abnormal TSH on occasion    Past Surgical History:   Procedure Laterality Date    ARTHROSCOPIC REPAIR OF ROTATOR CUFF OF SHOULDER Right 7/5/2022    Procedure: REPAIR, ROTATOR CUFF, ARTHROSCOPIC;  Surgeon: Valerei Olivera MD;  Location: [facility] OR;  Service: Orthopedics;  Laterality: Right;  HETAL LEMUS [phone]/ TEXTED ALLAN ON 6/23/2022 @ 9:47AM. ALLAN RESPONDED ON 6/23/2022 @ 10:33AM-  JEAN PAUL BABIN [phone] MY BE HERE FOR CASE SPOKE TO HIM @ 9:59AM ON 7-1-2022  RN PREOP 6/28/2022    BACK SURGERY      2002    CATARACT EXTRACTION W/  INTRAOCULAR LENS IMPLANT Right 08/29/2017    Dr. Hong    CATARACT EXTRACTION W/  INTRAOCULAR LENS IMPLANT Left 02/24/2022    Dr. Hong    CAUDAL EPIDURAL STEROID INJECTION N/A 9/25/2024    Procedure: Caudal Epidural Steroid Injection;  Surgeon: Eze Byrd Jr., MD;  Location: [facility] PAIN MANAGEMENT;  Service: Pain Management;  Laterality: N/A;  @1100(prev. 715)  Eliquis last 9/21  Plavix last 9/19  Check BG  E-Consent    COLONOSCOPY N/A 2/21/2017    Procedure: COLONOSCOPY;  Surgeon: Robb Rosado MD;  Location: [facility] ENDO;  Service: Endoscopy;  Laterality: N/A;    COLONOSCOPY N/A 7/5/2023    Procedure: COLONOSCOPY;  Surgeon: Aston Varela MD;  Location: [facility] ENDO;  Service: Endoscopy;  Laterality: N/A;  Referral: JOVANNI SCANLON  to hold Plavix 5 days and Eliquis 2 days per Dr Purdy-GT  [facility]  Suprep   Inst portal   LW    CORONARY ANGIOGRAPHY INCLUDING BYPASS GRAFTS WITH CATHETERIZATION OF LEFT HEART N/A 11/11/2020    Procedure: ANGIOGRAM, CORONARY, INCLUDING BYPASS GRAFT, WITH LEFT HEART CATHETERIZATION;  Surgeon: Lane Cuellar MD;  Location: [facility] CATH LAB;  Service: Cardiology;  Laterality: N/A;  RN PRE OP 11-5-2020. --COVID NEGATIVE ON  11-. C A    CORONARY ARTERY BYPASS GRAFT      March 2016    EPIDURAL STEROID INJECTION Bilateral 11/14/2018    Procedure: Lumbar Medial Branch Blocks;  Surgeon: Eze Byrd Jr., MD;  Location: [facility] ENDO;  Service: Pain Management;  Laterality: Bilateral;  Bilateral Lumbar Medial Branch Blocks L2, L3, L4, L5    [id number]  [id number]    Arrive @ 1150; NO Sedation    EPIDURAL STEROID INJECTION Bilateral 11/28/2018    Procedure: Injection, Steroid, Epidural;  Surgeon: Eze Byrd Jr., MD;  Location: [facility] ENDO;  Service: Pain Management;  Laterality: Bilateral;  Bilateral Sacroiliac Joint Steroid Injections    [id number]    Arrive @ 0910    EPIDURAL STEROID INJECTION Bilateral 3/20/2019    Procedure: Injection, Steroid, Sacroiliiac Joint;  Surgeon: Eze Byrd Jr., MD;  Location: [facility] ENDO;  Service: Pain Management;  Laterality: Bilateral;  Bilateral Sacroiliac Joint Steroid Injections    43500    Arrive @ 1145; Trigger point injections also?    EPIDURAL STEROID INJECTION Right 8/2/2023    Procedure: Right L5 Transforaminal Epidural Steroid Injection;  Surgeon: Eze Byrd Jr., MD;  Location: [facility] ENDO;  Service: Pain Management;  Laterality: Right;  @0830 (given)  Eliquis last 7/29  Plavix last 7/27  Check BG    EPIDURAL STEROID INJECTION Right 10/11/2023    Procedure: Right L3 (infraneural) + S1 Transforaminal Epidural Steroid Injections;  Surgeon: Eze Byrd Jr., MD;  Location: [facility] ENDO;  Service: Pain Management;  Laterality: Right;  @0745 (requests  morning)  Eliquis 10/ & Plavix 10/  Check BG    ESOPHAGOGASTRODUODENOSCOPY N/A 2023    Procedure: EGD (ESOPHAGOGASTRODUODENOSCOPY);  Surgeon: Anita Oswald MD;  Location: [facility] ENDO;  Service: Endoscopy;  Laterality: N/A;  Procedure Timin-4 weeks  Provider: Any GI provider  Location: No Preference  Additional Scheduling Information: Blood thinners  Prep Specifications:N/A   ref. by Dr. Light, instr. to portal, , [facility]  ok to hold Plavix 5 days and Eliquis 2 day    INTRAOCULAR PROSTHESES INSERTION Left 2022    Procedure: INSERTION, IOL PROSTHESIS;  Surgeon: Nickolas Hong MD;  Location: [facility] OR;  Service: Ophthalmology;  Laterality: Left;    PHACOEMULSIFICATION OF CATARACT Left 2022    Procedure: PHACOEMULSIFICATION, CATARACT;  Surgeon: Nickolas Hong MD;  Location: [facility] OR;  Service: Ophthalmology;  Laterality: Left;  RN Phone Pre Op 22.  Covid NEGATIVE  22.  Arrival 08:00 am.    TONSILLECTOMY           Family history: Father  at 77 with stroke.  Mother  at 72 with heart problems, diabetes, hypertension.  2 sisters living in their 80s and one  at 82.  One brother  at 80 with some kidney disease and diabetes.  Sisters with diabetes, hypertension and stroke.  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  He drinks alcohol about 3 times.  Never smoked.      Vital signs as above,     Gen: no distress  No ataxia          Assessment plan:        Diagnoses and all orders for this visit:    Type 2 diabetes mellitus with hyperglycemia, with long-term current use of insulin, improving, followed by nurse practitioner but discussed management today in that if indeed sugars continue to drop you may need to stop the Amaryl.  I would prefer him not to eat extra calories just to keep his sugar up and it probably would be better to discontinue  the Amaryl depending upon A1c results and so forth.  Further weight loss would be better overall for his diabetes in the multitude of his other medical condition so I would prefer him to eat less    Stage 3a chronic kidney disease, chronic and stable    Diabetic polyneuropathy associated with type 2 diabetes mellitus    Long-term insulin use, continue Tresiba    Essential hypertension, chronic and stable    Iron deficiency anemia, unspecified iron deficiency anemia type, chronic and stable    Insomnia, unspecified type, chronic and stable    Sedative dependence    Vertigo, active, suppress with meclizine    Stress due to illness of family member, discuss memory issue    Other orders  -     meclizine (ANTIVERT) 25 mg tablet; Take 1 tablet (25 mg total) by mouth 3 (three) times daily as needed for Dizziness (or at least one HS for 1 week when vertigo active).

## 2024-12-12 ENCOUNTER — HOSPITAL ENCOUNTER (OUTPATIENT)
Dept: RADIOLOGY | Facility: HOSPITAL | Age: 74
Discharge: HOME | End: 2024-12-12
Attending: STUDENT IN AN ORGANIZED HEALTH CARE EDUCATION/TRAINING PROGRAM
Payer: MEDICARE

## 2024-12-12 ENCOUNTER — OFFICE VISIT (OUTPATIENT)
Dept: NEUROSURGERY | Facility: CLINIC | Age: 74
End: 2024-12-12
Attending: STUDENT IN AN ORGANIZED HEALTH CARE EDUCATION/TRAINING PROGRAM
Payer: MEDICARE

## 2024-12-12 VITALS
HEART RATE: 48 BPM | OXYGEN SATURATION: 100 % | HEIGHT: 67 IN | DIASTOLIC BLOOD PRESSURE: 60 MMHG | SYSTOLIC BLOOD PRESSURE: 130 MMHG | BODY MASS INDEX: 27.51 KG/M2 | WEIGHT: 175.25 LBS

## 2024-12-12 DIAGNOSIS — M54.16 LUMBAR RADICULOPATHY: Primary | ICD-10-CM

## 2024-12-12 DIAGNOSIS — M54.16 LUMBAR RADICULOPATHY: ICD-10-CM

## 2024-12-12 PROCEDURE — 1159F MED LIST DOCD IN RCRD: CPT | Mod: CPTII,S$GLB,, | Performed by: STUDENT IN AN ORGANIZED HEALTH CARE EDUCATION/TRAINING PROGRAM

## 2024-12-12 PROCEDURE — 1125F AMNT PAIN NOTED PAIN PRSNT: CPT | Mod: CPTII,S$GLB,, | Performed by: STUDENT IN AN ORGANIZED HEALTH CARE EDUCATION/TRAINING PROGRAM

## 2024-12-12 PROCEDURE — 3078F DIAST BP <80 MM HG: CPT | Mod: CPTII,S$GLB,, | Performed by: STUDENT IN AN ORGANIZED HEALTH CARE EDUCATION/TRAINING PROGRAM

## 2024-12-12 PROCEDURE — 99214 OFFICE O/P EST MOD 30 MIN: CPT | Mod: S$GLB,,, | Performed by: STUDENT IN AN ORGANIZED HEALTH CARE EDUCATION/TRAINING PROGRAM

## 2024-12-12 PROCEDURE — 3061F NEG MICROALBUMINURIA REV: CPT | Mod: CPTII,S$GLB,, | Performed by: STUDENT IN AN ORGANIZED HEALTH CARE EDUCATION/TRAINING PROGRAM

## 2024-12-12 PROCEDURE — 3044F HG A1C LEVEL LT 7.0%: CPT | Mod: CPTII,S$GLB,, | Performed by: STUDENT IN AN ORGANIZED HEALTH CARE EDUCATION/TRAINING PROGRAM

## 2024-12-12 PROCEDURE — 3066F NEPHROPATHY DOC TX: CPT | Mod: CPTII,S$GLB,, | Performed by: STUDENT IN AN ORGANIZED HEALTH CARE EDUCATION/TRAINING PROGRAM

## 2024-12-12 PROCEDURE — 72114 X-RAY EXAM L-S SPINE BENDING: CPT | Mod: 26,,, | Performed by: RADIOLOGY

## 2024-12-12 PROCEDURE — 72114 X-RAY EXAM L-S SPINE BENDING: CPT | Mod: TC,FY

## 2024-12-12 PROCEDURE — 3075F SYST BP GE 130 - 139MM HG: CPT | Mod: CPTII,S$GLB,, | Performed by: STUDENT IN AN ORGANIZED HEALTH CARE EDUCATION/TRAINING PROGRAM

## 2024-12-12 NOTE — H&P (VIEW-ONLY)
[facility]  Neurosurgery    SUBJECTIVE:     Interval history 12/12/24:  Patient has been considering his options and would like to pursue surgical intervention.  He has been dealing with this pain for about 9 months at this point.  He has severe pain in the right leg.  He does have back pain as well which bothers him a lot.  The back pain is mostly off to the right side of the back.    History of Present Illness:  Driss Hernandez Jr. is a 74 y.o. male past medical history significant for coronary artery disease status post bypass, diabetes mellitus (A1C 6.2), prior right L4/5 laminectomy 15 years ago, who presents for evaluation of 8 months of right lower extremity pain.    Patient states he has been dealing with this for about 8 months.  He has had numerous epidural steroid injections which have provided some relief.  He currently takes a sleeping pill at night followed by 3 gabapentin pills and this helps him get to bed comfortably, but he wakes up around 3:00 a.m. with severe right lower extremity pain.  He describes this is coming down the right leg to the anterior lower leg toward the anterior shin and ankle.  This sounds like a combination of L4 and L5.    So far this year, he has been through physical therapy as well as numerous epidural steroid injections, most recently a caudal epidural.  This did provide some pain relief.  However, he has severe right lower extremity pain which bothers him significantly.  He does have low back pain which is something he has dealt with for years.  It is worse when he is sitting.  It is okay when he is up and moving.  He states that if his right lower extremity pain could be improved this would be a successful surgical result for him.    (Not in a hospital admission)      Review of patient's allergies indicates:   Allergen Reactions    Penicillins Other (See Comments)     Unknown reaction, Had a reaction as a child    Shrimp Itching     Hand Itching       Past  Medical History:   Diagnosis Date    Chronic heart failure with preserved ejection fraction 2/9/2023    Chronic midline low back pain without sciatica 10/2/2017    Colon polyps     Coronary artery disease involving native coronary artery of native heart 3/5/2020    Coronary artery disease involving native coronary artery of native heart with angina pectoris 12/10/2020    Diabetes mellitus with neurological manifestations, uncontrolled 1/24/2017    Diabetic polyneuropathy associated with type 2 diabetes mellitus 1/24/2017    Diabetic polyneuropathy associated with type 2 diabetes mellitus     Essential hypertension 1/24/2017    Gastroesophageal reflux disease 1/24/2017    Hyperlipidemia 1/24/2017    Insomnia 1/24/2017    Long-term insulin use 1/24/2017    Longstanding persistent atrial fibrillation 12/30/2020    Lumbar spondylosis 11/13/2017    Nuclear sclerosis of both eyes 8/24/2017    Obesity     PAD (peripheral artery disease) 3/23/2022    Stage 3 chronic kidney disease 2/13/2019    Uncontrolled type 2 diabetes mellitus without complication, with long-term current use of insulin 1/24/2017     Past Surgical History:   Procedure Laterality Date    ARTHROSCOPIC REPAIR OF ROTATOR CUFF OF SHOULDER Right 7/5/2022    Procedure: REPAIR, ROTATOR CUFF, ARTHROSCOPIC;  Surgeon: Valerie Olivera MD;  Location: [facility];  Service: Orthopedics;  Laterality: Right;  GUADALUPE AND NEPHJENNIFER LEMUS [phone]/ TEXTED ALLAN ON 6/23/2022 @ 9:47AM. ALLAN RESPONDED ON 6/23/2022 @ 10:33AM-  JEAN PAUL BABIN [phone] MY BE HERE FOR CASE SPOKE TO HIM @ 9:59AM ON 7-1-2022  RN PREOP 6/28/2022    BACK SURGERY      2002    CATARACT EXTRACTION W/  INTRAOCULAR LENS IMPLANT Right 08/29/2017    Dr. Hong    CATARACT EXTRACTION W/  INTRAOCULAR LENS IMPLANT Left 02/24/2022    Dr. Hong    CAUDAL EPIDURAL STEROID INJECTION N/A 9/25/2024    Procedure: Caudal Epidural Steroid Injection;  Surgeon: Eze Byrd Jr., MD;  Location:  [facility] PAIN MANAGEMENT;  Service: Pain Management;  Laterality: N/A;  @1100(prev. 715)  Eliquis last 9/21  Plavix last 9/19  Check BG  E-Consent    COLONOSCOPY N/A 2/21/2017    Procedure: COLONOSCOPY;  Surgeon: Robb Rosado MD;  Location: [facility] ENDO;  Service: Endoscopy;  Laterality: N/A;    COLONOSCOPY N/A 7/5/2023    Procedure: COLONOSCOPY;  Surgeon: Aston Varela MD;  Location: [facility] ENDO;  Service: Endoscopy;  Laterality: N/A;  Referral: JOVANNI SCANLON to hold Plavix 5 days and Eliquis 2 days per Dr Ford   Meds  Suprep   Inst portal   LW    CORONARY ANGIOGRAPHY INCLUDING BYPASS GRAFTS WITH CATHETERIZATION OF LEFT HEART N/A 11/11/2020    Procedure: ANGIOGRAM, CORONARY, INCLUDING BYPASS GRAFT, WITH LEFT HEART CATHETERIZATION;  Surgeon: Lane Cuellar MD;  Location: [facility] CATH LAB;  Service: Cardiology;  Laterality: N/A;  RN PRE OP 11-5-2020. --COVID NEGATIVE ON  11-. C A    CORONARY ARTERY BYPASS GRAFT      March 2016    EPIDURAL STEROID INJECTION Bilateral 11/14/2018    Procedure: Lumbar Medial Branch Blocks;  Surgeon: Eze Byrd Jr., MD;  Location: [facility] ENDO;  Service: Pain Management;  Laterality: Bilateral;  Bilateral Lumbar Medial Branch Blocks L2, L3, L4, L5    [id number]  99264    Arrive @ 1150; NO Sedation    EPIDURAL STEROID INJECTION Bilateral 11/28/2018    Procedure: Injection, Steroid, Epidural;  Surgeon: Eze Byrd Jr., MD;  Location: [facility];  Service: Pain Management;  Laterality: Bilateral;  Bilateral Sacroiliac Joint Steroid Injections    [id number]    Arrive @ 0910    EPIDURAL STEROID INJECTION Bilateral 3/20/2019    Procedure: Injection, Steroid, Sacroiliiac Joint;  Surgeon: Eze Byrd Jr., MD;  Location: [facility] ENDO;  Service: Pain Management;  Laterality: Bilateral;  Bilateral Sacroiliac Joint Steroid Injections    [id number]    Arrive @ 1145; Trigger point injections also?    EPIDURAL STEROID INJECTION Right 8/2/2023    Procedure: Right L5 Transforaminal  Epidural Steroid Injection;  Surgeon: Eze Byrd Jr., MD;  Location: [facility] ENDO;  Service: Pain Management;  Laterality: Right;  @0830 (given)  Eliquis last   Plavix last   Check BG    EPIDURAL STEROID INJECTION Right 10/11/2023    Procedure: Right L3 (infraneural) + S1 Transforaminal Epidural Steroid Injections;  Surgeon: Eze Byrd Jr., MD;  Location: [facility] ENDO;  Service: Pain Management;  Laterality: Right;  @0745 (requests morning)  Eliquis 10/7 & Plavix 10/5  Check BG    ESOPHAGOGASTRODUODENOSCOPY N/A 2023    Procedure: EGD (ESOPHAGOGASTRODUODENOSCOPY);  Surgeon: Anita Oswald MD;  Location: [facility] ENDO;  Service: Endoscopy;  Laterality: N/A;  Procedure Timin-4 weeks  Provider: Any GI provider  Location: No Preference  Additional Scheduling Information: Blood thinners  Prep Specifications:N/A   ref. by Dr. Light, instr. to portal, , [facility]  ok to hold Plavix 5 days and Eliquis 2 day    INTRAOCULAR PROSTHESES INSERTION Left 2022    Procedure: INSERTION, IOL PROSTHESIS;  Surgeon: Nickolas Hong MD;  Location: [facility] OR;  Service: Ophthalmology;  Laterality: Left;    PHACOEMULSIFICATION OF CATARACT Left 2022    Procedure: PHACOEMULSIFICATION, CATARACT;  Surgeon: Nickolas Hong MD;  Location: [facility] OR;  Service: Ophthalmology;  Laterality: Left;  RN Phone Pre Op 22.  Covid NEGATIVE  22.  Arrival 08:00 am.    TONSILLECTOMY       Family History   Problem Relation Name Age of Onset    Depression Mother      Heart disease Mother      Stroke Father      No Known Problems Sister Elaine     Diabetes Sister Dilcia     No Known Problems Sister Idalia     Blindness Brother Burt     No Known Problems Maternal Aunt      No Known Problems Maternal Uncle      No Known Problems Paternal Aunt      No Known Problems Paternal Uncle      No Known Problems Maternal Grandmother      No Known Problems Maternal Grandfather      No Known Problems  Paternal Grandmother      No Known Problems Paternal Grandfather      Amblyopia Neg Hx      Cancer Neg Hx      Cataracts Neg Hx      Glaucoma Neg Hx      Hypertension Neg Hx      Macular degeneration Neg Hx      Retinal detachment Neg Hx      Strabismus Neg Hx      Thyroid disease Neg Hx       Social History     Tobacco Use    Smoking status: Never     Passive exposure: Never    Smokeless tobacco: Never   Substance Use Topics    Alcohol use: Not Currently    Drug use: No        Review of Systems:  As noted in HPI    OBJECTIVE:     Vital Signs (Most Recent):  Pulse: (!) 48 (12/12/24 0845)  BP: 130/60 (12/12/24 0845)  SpO2: 100 % (12/12/24 0845)    Physical Exam:  General: well developed, well nourished, no distress  Head: normocephalic, atraumatic  Neurologic: Alert and oriented. Thought content appropriate  Speech: Fluent  Cranial nerves: face symmetric   Eyes: pupils equal  Pulmonary: normal respirations  Sensory: intact to light touch throughout  Motor Strength: Moves all extremities spontaneously with good tone.     Delt Bi Tri Wrist ext.  IO  RUE:   5    5   5        5          5    5  LUE:      5    5   5        5          5    5   HF KE EHL DF PF  RLE   5    5     4     4    5  LLE:  5    5     4     4    5    DTR's - diminished patellar reflexes  Alva: absent    Gait: normal  Tandem Gait: No difficulty     Midline Bony Tenderness: none  Paraspinous muscle tenderness: none  Straight Leg Raise: positive on right at 45 deg    Diagnostic Results:  I have personally reviewed imaging. My impression is as follows.    MRI of the lumbar Spine without contrast was performed on 10/28/2024.  This shows spondylosis at every level in the lumbar spine.  There is evidence of a prior laminectomy at the L4-5 level.  There has also been a right-sided medial facetectomy with 1/3 of the facet removed.  There is lateral recess stenosis bilaterally at this level with compression of the L5 nerve roots.  There does appear to  be a right-sided disc extrusion which proceed somewhat caudally and compresses the L5 nerve root in the right.  In terms of foraminal stenosis, I believe the L4 root is free.    Previous CT chest abdomen pelvis from 06/24/2023 again show spondylosis.  The disc bulge causing lateral recess stenosis at L4-5 appears to be calcified.    Flexion-extension x-rays were obtained and do not show any instability.      ASSESSMENT/PLAN:     Driss Hernandez Jr. is a 74 y.o. male who presents with continued right-greater-than-left radiculopathy in the setting of prior L4-5 laminectomy.  He has failed conservative management including therapy, medications, injections.  -patient has failed an extensive course of conservative management over the last 9 months and would like to proceed with surgery  -we discussed the options of fusion versus redo microdiskectomy.  Patient would prefer to undergo redo microdiskectomy with the understanding that this is not expected to cure his back pain and that he could eventually require lumbar fusion.  I discussed with him that there is risk he could have to proceed to a fusion if he developed worsening back pain or instability after laminectomy  -signed informed consent documented today.  We specifically discussed risk of CSF leak in addition to the risk of continued pain and instability  -asked patient to discuss medical and cardiac clearance with Dr. Willoughby and Dr. Purdy  -tentatively planned for January 8, 2025      Please feel free to call with any further questions.      Time spent on this encounter: 45 minutes. This includes face-to-face time and non-face to face time preparing to see the patient (eg, review of tests), obtaining and/or reviewing separately obtained history, documenting clinical information in the electronic or other health record, independently interpreting results and communicating results to the patient/family/caregiver, or care coordinator.      Luis M HIGH  [facility]  Department of Neurosurgery  [phone]    Disclaimer: This note was dictated by speech recognition. Minor errors in transcription may be present.  Please call with any questions.

## 2024-12-12 NOTE — PROGRESS NOTES
[facility]  Neurosurgery    SUBJECTIVE:     Interval history 12/12/24:  Patient has been considering his options and would like to pursue surgical intervention.  He has been dealing with this pain for about 9 months at this point.  He has severe pain in the right leg.  He does have back pain as well which bothers him a lot.  The back pain is mostly off to the right side of the back.    History of Present Illness:  Driss Hernandez Jr. is a 74 y.o. male past medical history significant for coronary artery disease status post bypass, diabetes mellitus (A1C 6.2), prior right L4/5 laminectomy 15 years ago, who presents for evaluation of 8 months of right lower extremity pain.    Patient states he has been dealing with this for about 8 months.  He has had numerous epidural steroid injections which have provided some relief.  He currently takes a sleeping pill at night followed by 3 gabapentin pills and this helps him get to bed comfortably, but he wakes up around 3:00 a.m. with severe right lower extremity pain.  He describes this is coming down the right leg to the anterior lower leg toward the anterior shin and ankle.  This sounds like a combination of L4 and L5.    So far this year, he has been through physical therapy as well as numerous epidural steroid injections, most recently a caudal epidural.  This did provide some pain relief.  However, he has severe right lower extremity pain which bothers him significantly.  He does have low back pain which is something he has dealt with for years.  It is worse when he is sitting.  It is okay when he is up and moving.  He states that if his right lower extremity pain could be improved this would be a successful surgical result for him.    (Not in a hospital admission)      Review of patient's allergies indicates:   Allergen Reactions    Penicillins Other (See Comments)     Unknown reaction, Had a reaction as a child    Shrimp Itching     Hand Itching       Past  Medical History:   Diagnosis Date    Chronic heart failure with preserved ejection fraction 2/9/2023    Chronic midline low back pain without sciatica 10/2/2017    Colon polyps     Coronary artery disease involving native coronary artery of native heart 3/5/2020    Coronary artery disease involving native coronary artery of native heart with angina pectoris 12/10/2020    Diabetes mellitus with neurological manifestations, uncontrolled 1/24/2017    Diabetic polyneuropathy associated with type 2 diabetes mellitus 1/24/2017    Diabetic polyneuropathy associated with type 2 diabetes mellitus     Essential hypertension 1/24/2017    Gastroesophageal reflux disease 1/24/2017    Hyperlipidemia 1/24/2017    Insomnia 1/24/2017    Long-term insulin use 1/24/2017    Longstanding persistent atrial fibrillation 12/30/2020    Lumbar spondylosis 11/13/2017    Nuclear sclerosis of both eyes 8/24/2017    Obesity     PAD (peripheral artery disease) 3/23/2022    Stage 3 chronic kidney disease 2/13/2019    Uncontrolled type 2 diabetes mellitus without complication, with long-term current use of insulin 1/24/2017     Past Surgical History:   Procedure Laterality Date    ARTHROSCOPIC REPAIR OF ROTATOR CUFF OF SHOULDER Right 7/5/2022    Procedure: REPAIR, ROTATOR CUFF, ARTHROSCOPIC;  Surgeon: Valerie Olivera MD;  Location: [facility];  Service: Orthopedics;  Laterality: Right;  GUADALUPE AND NEPHJENNIFER LEMUS [phone]/ TEXTED ALLAN ON 6/23/2022 @ 9:47AM. ALLAN RESPONDED ON 6/23/2022 @ 10:33AM-  JEAN PAUL BABIN [phone] MY BE HERE FOR CASE SPOKE TO HIM @ 9:59AM ON 7-1-2022  RN PREOP 6/28/2022    BACK SURGERY      2002    CATARACT EXTRACTION W/  INTRAOCULAR LENS IMPLANT Right 08/29/2017    Dr. Hong    CATARACT EXTRACTION W/  INTRAOCULAR LENS IMPLANT Left 02/24/2022    Dr. Hong    CAUDAL EPIDURAL STEROID INJECTION N/A 9/25/2024    Procedure: Caudal Epidural Steroid Injection;  Surgeon: Eze Byrd Jr., MD;  Location:  Health system [facility] MANAGEMENT;  Service: Pain Management;  Laterality: N/A;  @1100(prev. 715)  Eliquis last 9/21  Plavix last 9/19  Check BG  E-Consent    COLONOSCOPY N/A 2/21/2017    Procedure: COLONOSCOPY;  Surgeon: Robb Rosado MD;  Location: Health system ENDO;  Service: Endoscopy;  Laterality: N/A;    COLONOSCOPY N/A 7/5/2023    Procedure: COLONOSCOPY;  Surgeon: Aston Varela MD;  Location: Health system ENDO;  Service: Endoscopy;  Laterality: N/A;  Referral: JOVANNI SCANLON to hold Plavix 5 days and Eliquis 2 days per Dr Ford   Meds  Suprep   Inst portal   LW    CORONARY ANGIOGRAPHY INCLUDING BYPASS GRAFTS WITH CATHETERIZATION OF LEFT HEART N/A 11/11/2020    Procedure: ANGIOGRAM, CORONARY, INCLUDING BYPASS GRAFT, WITH LEFT HEART CATHETERIZATION;  Surgeon: Lane Cuellar MD;  Location: Health system CATH LAB;  Service: Cardiology;  Laterality: N/A;  RN PRE OP 11-5-2020. --COVID NEGATIVE ON  11-. C A    CORONARY ARTERY BYPASS GRAFT      March 2016    EPIDURAL STEROID INJECTION Bilateral 11/14/2018    Procedure: Lumbar Medial Branch Blocks;  Surgeon: Eze Byrd Jr., MD;  Location: Health system ENDO;  Service: Pain Management;  Laterality: Bilateral;  Bilateral Lumbar Medial Branch Blocks L2, L3, L4, L5    27267  30799    Arrive @ 1150; NO Sedation    EPIDURAL STEROID INJECTION Bilateral 11/28/2018    Procedure: Injection, Steroid, Epidural;  Surgeon: Eze Byrd Jr., MD;  Location: [facility];  Service: Pain Management;  Laterality: Bilateral;  Bilateral Sacroiliac Joint Steroid Injections    31595    Arrive @ 0910    EPIDURAL STEROID INJECTION Bilateral 3/20/2019    Procedure: Injection, Steroid, Sacroiliiac Joint;  Surgeon: Eze Byrd Jr., MD;  Location: Health system ENDO;  Service: Pain Management;  Laterality: Bilateral;  Bilateral Sacroiliac Joint Steroid Injections    35055    Arrive @ 1145; Trigger point injections also?    EPIDURAL STEROID INJECTION Right 8/2/2023    Procedure: Right L5 Transforaminal  Epidural Steroid Injection;  Surgeon: Eze Byrd Jr., MD;  Location: [facility] ENDO;  Service: Pain Management;  Laterality: Right;  @0830 (given)  Eliquis last   Plavix last   Check BG    EPIDURAL STEROID INJECTION Right 10/11/2023    Procedure: Right L3 (infraneural) + S1 Transforaminal Epidural Steroid Injections;  Surgeon: Eze Byrd Jr., MD;  Location: [facility] ENDO;  Service: Pain Management;  Laterality: Right;  @0745 (requests morning)  Eliquis 10/7 & Plavix 10/5  Check BG    ESOPHAGOGASTRODUODENOSCOPY N/A 2023    Procedure: EGD (ESOPHAGOGASTRODUODENOSCOPY);  Surgeon: Anita Oswald MD;  Location: [facility] ENDO;  Service: Endoscopy;  Laterality: N/A;  Procedure Timin-4 weeks  Provider: Any GI provider  Location: No Preference  Additional Scheduling Information: Blood thinners  Prep Specifications:N/A   ref. by Dr. Light, instr. to portal, , [facility] med[facility]  ok to hold Plavix 5 days and Eliquis 2 day    INTRAOCULAR PROSTHESES INSERTION Left 2022    Procedure: INSERTION, IOL PROSTHESIS;  Surgeon: Nickolas Hong MD;  Location: [facility] OR;  Service: Ophthalmology;  Laterality: Left;    PHACOEMULSIFICATION OF CATARACT Left 2022    Procedure: PHACOEMULSIFICATION, CATARACT;  Surgeon: Nickolas Hong MD;  Location: [facility] OR;  Service: Ophthalmology;  Laterality: Left;  RN Phone Pre Op 22.  Covid NEGATIVE  22.  Arrival 08:00 am.    TONSILLECTOMY       Family History   Problem Relation Name Age of Onset    Depression Mother      Heart disease Mother      Stroke Father      No Known Problems Sister Elaine     Diabetes Sister Dilcia     No Known Problems Sister Idalia     Blindness Brother Burt     No Known Problems Maternal Aunt      No Known Problems Maternal Uncle      No Known Problems Paternal Aunt      No Known Problems Paternal Uncle      No Known Problems Maternal Grandmother      No Known Problems Maternal Grandfather      No Known Problems  Paternal Grandmother      No Known Problems Paternal Grandfather      Amblyopia Neg Hx      Cancer Neg Hx      Cataracts Neg Hx      Glaucoma Neg Hx      Hypertension Neg Hx      Macular degeneration Neg Hx      Retinal detachment Neg Hx      Strabismus Neg Hx      Thyroid disease Neg Hx       Social History     Tobacco Use    Smoking status: Never     Passive exposure: Never    Smokeless tobacco: Never   Substance Use Topics    Alcohol use: Not Currently    Drug use: No        Review of Systems:  As noted in HPI    OBJECTIVE:     Vital Signs (Most Recent):  Pulse: (!) 48 (12/12/24 0845)  BP: 130/60 (12/12/24 0845)  SpO2: 100 % (12/12/24 0845)    Physical Exam:  General: well developed, well nourished, no distress  Head: normocephalic, atraumatic  Neurologic: Alert and oriented. Thought content appropriate  Speech: Fluent  Cranial nerves: face symmetric   Eyes: pupils equal  Pulmonary: normal respirations  Sensory: intact to light touch throughout  Motor Strength: Moves all extremities spontaneously with good tone.     Delt Bi Tri Wrist ext.  IO  RUE:   5    5   5        5          5    5  LUE:      5    5   5        5          5    5   HF KE EHL DF PF  RLE   5    5     4     4    5  LLE:  5    5     4     4    5    DTR's - diminished patellar reflexes  Alva: absent    Gait: normal  Tandem Gait: No difficulty     Midline Bony Tenderness: none  Paraspinous muscle tenderness: none  Straight Leg Raise: positive on right at 45 deg    Diagnostic Results:  I have personally reviewed imaging. My impression is as follows.    MRI of the lumbar Spine without contrast was performed on 10/28/2024.  This shows spondylosis at every level in the lumbar spine.  There is evidence of a prior laminectomy at the L4-5 level.  There has also been a right-sided medial facetectomy with 1/3 of the facet removed.  There is lateral recess stenosis bilaterally at this level with compression of the L5 nerve roots.  There does appear to  be a right-sided disc extrusion which proceed somewhat caudally and compresses the L5 nerve root in the right.  In terms of foraminal stenosis, I believe the L4 root is free.    Previous CT chest abdomen pelvis from 06/24/2023 again show spondylosis.  The disc bulge causing lateral recess stenosis at L4-5 appears to be calcified.    Flexion-extension x-rays were obtained and do not show any instability.      ASSESSMENT/PLAN:     Driss Hernandez Jr. is a 74 y.o. male who presents with continued right-greater-than-left radiculopathy in the setting of prior L4-5 laminectomy.  He has failed conservative management including therapy, medications, injections.  -patient has failed an extensive course of conservative management over the last 9 months and would like to proceed with surgery  -we discussed the options of fusion versus redo microdiskectomy.  Patient would prefer to undergo redo microdiskectomy with the understanding that this is not expected to cure his back pain and that he could eventually require lumbar fusion.  I discussed with him that there is risk he could have to proceed to a fusion if he developed worsening back pain or instability after laminectomy  -signed informed consent documented today.  We specifically discussed risk of CSF leak in addition to the risk of continued pain and instability  -asked patient to discuss medical and cardiac clearance with Dr. Willoughby and Dr. Purdy  -tentatively planned for January 8, 2025      Please feel free to call with any further questions.      Time spent on this encounter: 45 minutes. This includes face-to-face time and non-face to face time preparing to see the patient (eg, review of tests), obtaining and/or reviewing separately obtained history, documenting clinical information in the electronic or other health record, independently interpreting results and communicating results to the patient/family/caregiver, or care coordinator.      Luis M HIGH  [facility]  Department of Neurosurgery  [phone]    Disclaimer: This note was dictated by speech recognition. Minor errors in transcription may be present.  Please call with any questions.

## 2024-12-17 ENCOUNTER — TELEPHONE (OUTPATIENT)
Dept: FAMILY MEDICINE | Facility: CLINIC | Age: 74
End: 2024-12-17
Payer: MEDICARE

## 2024-12-17 ENCOUNTER — PATIENT MESSAGE (OUTPATIENT)
Dept: CARDIOLOGY | Facility: CLINIC | Age: 74
End: 2024-12-17
Payer: MEDICARE

## 2024-12-17 NOTE — TELEPHONE ENCOUNTER
Let patient know that we will send a message to the neurosurgeon.      Patient just recently seen by me    Dr. Willoughby says it as long as you are not having any new cardiac symptoms or infectious symptoms that would need to be addressed, you are cleared medically by Dr. Willoughby.      Dr. Willoughby says you last saw cardiology Dr. Fletcher back in June so probably good that you make an appointment for an official exam and clearance with a cardiologist before surgery

## 2024-12-17 NOTE — TELEPHONE ENCOUNTER
Pt having Right L4/5 minimally invasive laminotomy/microdiscectomy on 1/15 but he states it will be 0n 1/8. No pre- op clearance was sent to this office.            ----- Message from [facility] sent at 12/17/2024  1:59 PM CST -----  Who called:ada-wife    What is the request in detail:pt states he is having a procedure done on Jan 8 and needed to check in with his pcp     Can the clinic reply by [facility]?no    Would the patient rather a call back or a response via [facility]?call    Best call back number:.[phone]    Additional Information:

## 2024-12-18 ENCOUNTER — TELEPHONE (OUTPATIENT)
Dept: CARDIOLOGY | Facility: CLINIC | Age: 74
End: 2024-12-18

## 2024-12-18 ENCOUNTER — PATIENT MESSAGE (OUTPATIENT)
Dept: CARDIOLOGY | Facility: CLINIC | Age: 74
End: 2024-12-18

## 2024-12-18 ENCOUNTER — OFFICE VISIT (OUTPATIENT)
Dept: CARDIOLOGY | Facility: CLINIC | Age: 74
End: 2024-12-18
Payer: MEDICARE

## 2024-12-18 VITALS
HEIGHT: 67 IN | RESPIRATION RATE: 18 BRPM | OXYGEN SATURATION: 98 % | SYSTOLIC BLOOD PRESSURE: 122 MMHG | WEIGHT: 173.06 LBS | BODY MASS INDEX: 27.16 KG/M2 | DIASTOLIC BLOOD PRESSURE: 72 MMHG | HEART RATE: 61 BPM

## 2024-12-18 DIAGNOSIS — I70.0 AORTIC ATHEROSCLEROSIS: ICD-10-CM

## 2024-12-18 DIAGNOSIS — I65.23 ATHEROSCLEROSIS OF BOTH CAROTID ARTERIES: ICD-10-CM

## 2024-12-18 DIAGNOSIS — M54.16 LUMBAR RADICULOPATHY: ICD-10-CM

## 2024-12-18 DIAGNOSIS — Z01.810 PREOP CARDIOVASCULAR EXAM: Primary | ICD-10-CM

## 2024-12-18 DIAGNOSIS — Z95.1 HX OF CABG: ICD-10-CM

## 2024-12-18 DIAGNOSIS — I10 ESSENTIAL HYPERTENSION: ICD-10-CM

## 2024-12-18 DIAGNOSIS — Z79.01 LONG TERM (CURRENT) USE OF ANTICOAGULANTS: ICD-10-CM

## 2024-12-18 DIAGNOSIS — E78.2 MIXED HYPERLIPIDEMIA: ICD-10-CM

## 2024-12-18 DIAGNOSIS — I48.20 CHRONIC ATRIAL FIBRILLATION: ICD-10-CM

## 2024-12-18 DIAGNOSIS — I73.9 PERIPHERAL VASCULAR DISEASE, UNSPECIFIED: ICD-10-CM

## 2024-12-18 DIAGNOSIS — R94.31 ABNORMAL EKG: ICD-10-CM

## 2024-12-18 DIAGNOSIS — M96.1 POSTLAMINECTOMY SYNDROME OF LUMBAR REGION: ICD-10-CM

## 2024-12-18 DIAGNOSIS — Z98.61 HISTORY OF PERCUTANEOUS CORONARY INTERVENTION: ICD-10-CM

## 2024-12-18 LAB
OHS QRS DURATION: 96 MS
OHS QTC CALCULATION: 381 MS

## 2024-12-18 PROCEDURE — 99214 OFFICE O/P EST MOD 30 MIN: CPT | Mod: HCNC,S$GLB,, | Performed by: INTERNAL MEDICINE

## 2024-12-18 PROCEDURE — 3061F NEG MICROALBUMINURIA REV: CPT | Mod: HCNC,CPTII,S$GLB, | Performed by: INTERNAL MEDICINE

## 2024-12-18 PROCEDURE — 93000 ELECTROCARDIOGRAM COMPLETE: CPT | Mod: HCNC,S$GLB,, | Performed by: INTERNAL MEDICINE

## 2024-12-18 PROCEDURE — 1159F MED LIST DOCD IN RCRD: CPT | Mod: HCNC,CPTII,S$GLB, | Performed by: INTERNAL MEDICINE

## 2024-12-18 PROCEDURE — 1126F AMNT PAIN NOTED NONE PRSNT: CPT | Mod: HCNC,CPTII,S$GLB, | Performed by: INTERNAL MEDICINE

## 2024-12-18 PROCEDURE — 3044F HG A1C LEVEL LT 7.0%: CPT | Mod: HCNC,CPTII,S$GLB, | Performed by: INTERNAL MEDICINE

## 2024-12-18 PROCEDURE — G2211 COMPLEX E/M VISIT ADD ON: HCPCS | Mod: HCNC,S$GLB,, | Performed by: INTERNAL MEDICINE

## 2024-12-18 PROCEDURE — 99999 PR PBB SHADOW E&M-EST. PATIENT-LVL III: CPT | Mod: PBBFAC,HCNC,, | Performed by: INTERNAL MEDICINE

## 2024-12-18 PROCEDURE — 3078F DIAST BP <80 MM HG: CPT | Mod: HCNC,CPTII,S$GLB, | Performed by: INTERNAL MEDICINE

## 2024-12-18 PROCEDURE — 3074F SYST BP LT 130 MM HG: CPT | Mod: HCNC,CPTII,S$GLB, | Performed by: INTERNAL MEDICINE

## 2024-12-18 PROCEDURE — 3066F NEPHROPATHY DOC TX: CPT | Mod: HCNC,CPTII,S$GLB, | Performed by: INTERNAL MEDICINE

## 2024-12-18 PROCEDURE — 3288F FALL RISK ASSESSMENT DOCD: CPT | Mod: HCNC,CPTII,S$GLB, | Performed by: INTERNAL MEDICINE

## 2024-12-18 PROCEDURE — 1101F PT FALLS ASSESS-DOCD LE1/YR: CPT | Mod: HCNC,CPTII,S$GLB, | Performed by: INTERNAL MEDICINE

## 2024-12-18 PROCEDURE — 1160F RVW MEDS BY RX/DR IN RCRD: CPT | Mod: HCNC,CPTII,S$GLB, | Performed by: INTERNAL MEDICINE

## 2024-12-18 RX ORDER — GABAPENTIN 300 MG/1
600 CAPSULE ORAL 2 TIMES DAILY
Start: 2024-12-18 | End: 2025-06-16

## 2024-12-18 NOTE — TELEPHONE ENCOUNTER
----- Message from Andriy sent at 12/18/2024 11:40 AM CST -----  Who called:self      What is the request in detail:pt is calling in regurds of his meds and would like for you to give him a call     Can the clinic reply by [facility]?     Would the patient rather a call back or a response via [facility]?callback     Best call back number:.[phone]       Additional Information:

## 2024-12-18 NOTE — PROGRESS NOTES
CARDIOVASCULAR PROGRESS NOTE    REASON FOR CONSULT:   Driss Hernandez Jr. is a 74 y.o. male who presents for follow up of CAD, HFpEF, Chr AF.    PCP: Ehrensing  Ortho: Jarod Ellis  HISTORY OF PRESENT ILLNESS:   Last seen June 2024    The patient comes in today for follow up.  He reports generally stable status without angina dyspnea, palpitations, or syncope.  There has been no PND, orthopnea, melena, hematuria, or claudication symptoms.  He continues take his Plavix and Eliquis.  It has been 4 years since his last coronary intervention, and I plan to continue Eliquis and we will discontinue his Plavix.      The patient requires preoperative cardiac risk stratification prior to planned neurosurgery.  I took the liberty exercising him in the office today and he is able to climb up a flight of stairs without any symptoms or limitations.    CARDIOVASCULAR HISTORY:   CAD 2016 CABG x3 (LIMA-LAD, SVG-D, SVG-PDA)   11/11/20 cath with diffuse native disease, patent SVG x2, LIMA-LAD atretic   12/10/20: PCI OM2 2.25x26 Resolute Isiah RICO    HFpEF    PAD    Chr AF on eliquis 5mg bid    Carotid atherosclerosis, bilat (US 9/2024)    PAST MEDICAL HISTORY:     Past Medical History:   Diagnosis Date    Chronic heart failure with preserved ejection fraction 2/9/2023    Chronic midline low back pain without sciatica 10/2/2017    Colon polyps     Coronary artery disease involving native coronary artery of native heart 3/5/2020    Coronary artery disease involving native coronary artery of native heart with angina pectoris 12/10/2020    Diabetes mellitus with neurological manifestations, uncontrolled 1/24/2017    Diabetic polyneuropathy associated with type 2 diabetes mellitus 1/24/2017    Diabetic polyneuropathy associated with type 2 diabetes mellitus     Essential hypertension 1/24/2017    Gastroesophageal reflux disease 1/24/2017    Hyperlipidemia 1/24/2017    Insomnia 1/24/2017    Long-term insulin use 1/24/2017     Longstanding persistent atrial fibrillation 12/30/2020    Lumbar spondylosis 11/13/2017    Nuclear sclerosis of both eyes 8/24/2017    Obesity     PAD (peripheral artery disease) 3/23/2022    Stage 3 chronic kidney disease 2/13/2019    Uncontrolled type 2 diabetes mellitus without complication, with long-term current use of insulin 1/24/2017       PAST SURGICAL HISTORY:     Past Surgical History:   Procedure Laterality Date    ARTHROSCOPIC REPAIR OF ROTATOR CUFF OF SHOULDER Right 7/5/2022    Procedure: REPAIR, ROTATOR CUFF, ARTHROSCOPIC;  Surgeon: Valerie Olivera MD;  Location: [facility] OR;  Service: Orthopedics;  Laterality: Right;  HETAL LEMUS [phone]/ TEXTED ALLAN ON 6/23/2022 @ 9:47AM. ALLAN RESPONDED ON 6/23/2022 @ 10:33AM-LO  JEAN PAUL BABIN [phone] MY BE HERE FOR CASE SPOKE TO HIM @ 9:59AM ON 7-1-2022  RN PREOP 6/28/2022    BACK SURGERY      2002    CATARACT EXTRACTION W/  INTRAOCULAR LENS IMPLANT Right 08/29/2017    Dr. Hong    CATARACT EXTRACTION W/  INTRAOCULAR LENS IMPLANT Left 02/24/2022    Dr. Hong    CAUDAL EPIDURAL STEROID INJECTION N/A 9/25/2024    Procedure: Caudal Epidural Steroid Injection;  Surgeon: Eze Byrd Jr., MD;  Location: [facility] PAIN MANAGEMENT;  Service: Pain Management;  Laterality: N/A;  @1100(prev. 715)  Eliquis last 9/21  Plavix last 9/19  Check BG  E-Consent    COLONOSCOPY N/A 2/21/2017    Procedure: COLONOSCOPY;  Surgeon: Robb Rosado MD;  Location: [facility] ENDO;  Service: Endoscopy;  Laterality: N/A;    COLONOSCOPY N/A 7/5/2023    Procedure: COLONOSCOPY;  Surgeon: Aston Varela MD;  Location: [facility] ENDO;  Service: Endoscopy;  Laterality: N/A;  Referral: JOVANNI SCANLON to hold Plavix 5 days and Eliquis 2 days per Dr Purdy-OFELIA   Meds  Suprep   Inst portal   LW    CORONARY ANGIOGRAPHY INCLUDING BYPASS GRAFTS WITH CATHETERIZATION OF LEFT HEART N/A 11/11/2020    Procedure: ANGIOGRAM, CORONARY, INCLUDING BYPASS GRAFT, WITH LEFT  HEART CATHETERIZATION;  Surgeon: Lane Cuellar MD;  Location: [facility] CATH LAB;  Service: Cardiology;  Laterality: N/A;  RN PRE OP 2020. --COVID NEGATIVE ON  11-. C A    CORONARY ARTERY BYPASS GRAFT      2016    EPIDURAL STEROID INJECTION Bilateral 2018    Procedure: Lumbar Medial Branch Blocks;  Surgeon: Eze Byrd Jr., MD;  Location: [facility] ENDO;  Service: Pain Management;  Laterality: Bilateral;  Bilateral Lumbar Medial Branch Blocks L2, L3, L4, L5    [id number]  14687    Arrive @ 1150; NO Sedation    EPIDURAL STEROID INJECTION Bilateral 2018    Procedure: Injection, Steroid, Epidural;  Surgeon: Eze Byrd Jr., MD;  Location: [facility] ENDO;  Service: Pain Management;  Laterality: Bilateral;  Bilateral Sacroiliac Joint Steroid Injections    [id number]    Arrive @ 0910    EPIDURAL STEROID INJECTION Bilateral 3/20/2019    Procedure: Injection, Steroid, Sacroiliiac Joint;  Surgeon: Eze Byrd Jr., MD;  Location: [facility] ENDO;  Service: Pain Management;  Laterality: Bilateral;  Bilateral Sacroiliac Joint Steroid Injections    11840    Arrive @ 1145; Trigger point injections also?    EPIDURAL STEROID INJECTION Right 2023    Procedure: Right L5 Transforaminal Epidural Steroid Injection;  Surgeon: Eze Byrd Jr., MD;  Location: [facility] ENDO;  Service: Pain Management;  Laterality: Right;  @0830 (given)  Eliquis last   Plavix last   Check BG    EPIDURAL STEROID INJECTION Right 10/11/2023    Procedure: Right L3 (infraneural) + S1 Transforaminal Epidural Steroid Injections;  Surgeon: Eze Byrd Jr., MD;  Location: [facility] ENDO;  Service: Pain Management;  Laterality: Right;  @0745 (requests morning)  Eliquis 10/7 & Plavix 10/5  Check BG    ESOPHAGOGASTRODUODENOSCOPY N/A 2023    Procedure: EGD (ESOPHAGOGASTRODUODENOSCOPY);  Surgeon: Anita Oswald MD;  Location: [facility] ENDO;  Service: Endoscopy;  Laterality: N/A;  Procedure Timin-4  weeks  Provider: Any GI provider  Location: No Preference  Additional Scheduling Information: Blood thinners  Prep Specifications:N/A  9/1 ref. by Dr. Light, instr. to portal, , [facility] meds-st  ok to hold Plavix 5 days and Eliquis 2 day    INTRAOCULAR PROSTHESES INSERTION Left 2/24/2022    Procedure: INSERTION, IOL PROSTHESIS;  Surgeon: Nickolas Hong MD;  Location: [facility] OR;  Service: Ophthalmology;  Laterality: Left;    PHACOEMULSIFICATION OF CATARACT Left 2/24/2022    Procedure: PHACOEMULSIFICATION, CATARACT;  Surgeon: Nickolas Hong MD;  Location: [facility] OR;  Service: Ophthalmology;  Laterality: Left;  RN Phone Pre Op 2-16-22.  Covid NEGATIVE  2-23-22.  Arrival 08:00 am.    TONSILLECTOMY         ALLERGIES AND MEDICATION:     Review of patient's allergies indicates:   Allergen Reactions    Penicillins Other (See Comments)     Unknown reaction, Had a reaction as a child    Shrimp Itching     Hand Itching        Medication List            Accurate as of December 18, 2024 10:40 AM. If you have any questions, ask your nurse or doctor.                CHANGE how you take these medications      lancets Misc  Check blood glucose 2x/day. True metrix. E11.65  What changed: Another medication with the same name was removed. Continue taking this medication, and follow the directions you see here.  Changed by: Eze Purdy MD            CONTINUE taking these medications      albuterol 90 mcg/actuation inhaler  Commonly known as: PROVENTIL/VENTOLIN HFA  Inhale 2 puffs into the lungs every 4 (four) hours as needed for Shortness of Breath. Rescue     ascorbic acid (vitamin C) 100 MG tablet  Commonly known as: VITAMIN C     atorvastatin 80 MG tablet  Commonly known as: LIPITOR  TAKE 1 TABLET EVERY EVENING     azelastine 137 mcg (0.1 %) nasal spray  Commonly known as: ASTELIN  1 spray (137 mcg total) by Nasal route 2 (two) times daily.     b complex vitamins tablet     blood-glucose meter kit  Test glucose  "2-3x/day. True metrix     clopidogreL 75 mg tablet  Commonly known as: PLAVIX  TAKE 1 TABLET ONE TIME DAILY     coenzyme Q10 100 mg capsule     dapagliflozin propanediol 5 mg Tab tablet  Commonly known as: Farxiga  Take 1 tablet (5 mg total) by mouth once daily.     ELIQUIS 5 mg Tab  Generic drug: apixaban  TAKE 1 TABLET TWICE DAILY     ergocalciferol 50,000 unit Cap  Commonly known as: ERGOCALCIFEROL  TAKE 1 CAPSULE BY MOUTH WEEKLY     fenofibrate 160 MG Tab  TAKE 1 TABLET EVERY MORNING     fluticasone propionate 50 mcg/actuation nasal spray  Commonly known as: FLONASE  2 sprays (100 mcg total) by Each Nostril route 2 (two) times daily.     furosemide 20 MG tablet  Commonly known as: LASIX  TAKE 1 TABLET TWICE DAILY     glimepiride 1 MG tablet  Commonly known as: AMARYL  TAKE 1 TABLET BEFORE BREAKFAST     hydrALAZINE 50 MG tablet  Commonly known as: APRESOLINE  TAKE 1 TABLET TWICE DAILY     insulin degludec 100 unit/mL (3 mL) insulin pen  Commonly known as: TRESIBA FLEXTOUCH U-100  Inject 20 Units into the skin once daily.     isosorbide mononitrate 120 MG 24 hr tablet  Commonly known as: IMDUR  Take 1 tablet (120 mg total) by mouth once daily.     ketoconazole 2 % cream  Commonly known as: NIZORAL  Apply topically once daily.     magnesium 250 mg Tab     meclizine 25 mg tablet  Commonly known as: ANTIVERT  Take 1 tablet (25 mg total) by mouth 3 (three) times daily as needed for Dizziness (or at least one HS for 1 week when vertigo active).     metFORMIN 500 MG ER 24hr tablet  Commonly known as: GLUCOPHAGE-XR  Take 1 tablet with breakfast and 2 tablets with supper.     nitroGLYCERIN 0.4 MG SL tablet  Commonly known as: NITROSTAT  Place 1 tablet (0.4 mg total) under the tongue every 5 (five) minutes as needed for Chest pain.     NOVOFINE 32 32 gauge x 1/4" Ndle  Generic drug: pen needle, diabetic  Use daily     omega-3 acid ethyl esters 1 gram capsule  Commonly known as: LOVAZA  Take 2 capsules (2 g total) by mouth " 2 (two) times daily.     OZEMPIC 2 mg/dose (8 mg/3 mL) Pnij  Generic drug: semaglutide  Inject 2 mg into the skin every 7 days.     pantoprazole 40 MG tablet  Commonly known as: PROTONIX  TAKE 1 TABLET EVERY DAY     triazolam 0.25 MG tablet  TAKE 1 TABLET BY MOUTH EVERY NIGHT AT BEDTIME AS NEEDED            STOP taking these medications      ALCOHOL PREP PAD SPACER  Generic drug: alcohol swabs  Stopped by: Eze Purdy MD     blood sugar diagnostic Strp  Stopped by: Eze Purdy MD     dexAMETHasone sodium phos (PF) 10 mg/mL Soln  Stopped by: Eze Purdy MD     gabapentin 300 MG capsule  Commonly known as: NEURONTIN  Stopped by: Eze Purdy MD     OMNIPAQUE 300 300 mg iodine/mL injection  Generic drug: iohexoL  Stopped by: Eze Purdy MD              SOCIAL HISTORY:     Social History     Socioeconomic History    Marital status:    Tobacco Use    Smoking status: Never     Passive exposure: Never    Smokeless tobacco: Never   Substance and Sexual Activity    Alcohol use: Not Currently    Drug use: No    Sexual activity: Yes     Partners: Female     Social Drivers of Health     Financial Resource Strain: Low Risk  (1/22/2024)    Overall Financial Resource Strain (CARDIA)     Difficulty of Paying Living Expenses: Not hard at all   Food Insecurity: No Food Insecurity (1/22/2024)    Hunger Vital Sign     Worried About Running Out of Food in the Last Year: Never true     Ran Out of Food in the Last Year: Never true   Transportation Needs: No Transportation Needs (1/22/2024)    PRAPARE - Transportation     Lack of Transportation (Medical): No     Lack of Transportation (Non-Medical): No   Physical Activity: Insufficiently Active (1/22/2024)    Exercise Vital Sign     Days of Exercise per Week: 3 days     Minutes of Exercise per Session: 30 min   Stress: Stress Concern Present (1/22/2024)    [employer] - Occupational Stress Questionnaire     Feeling of  Stress : To some extent   Housing Stability: Low Risk  (1/22/2024)    Housing Stability Vital Sign     Unable to Pay for Housing in the Last Year: No     Number of Places Lived in the Last Year: 1     Unstable Housing in the Last Year: No       FAMILY HISTORY:     Family History   Problem Relation Name Age of Onset    Depression Mother      Heart disease Mother      Stroke Father      No Known Problems Sister Elaine     Diabetes Sister Dilcia     No Known Problems Sister Idalia     Blindness Brother Burt     No Known Problems Maternal Aunt      No Known Problems Maternal Uncle      No Known Problems Paternal Aunt      No Known Problems Paternal Uncle      No Known Problems Maternal Grandmother      No Known Problems Maternal Grandfather      No Known Problems Paternal Grandmother      No Known Problems Paternal Grandfather      Amblyopia Neg Hx      Cancer Neg Hx      Cataracts Neg Hx      Glaucoma Neg Hx      Hypertension Neg Hx      Macular degeneration Neg Hx      Retinal detachment Neg Hx      Strabismus Neg Hx      Thyroid disease Neg Hx         REVIEW OF SYSTEMS:   Review of Systems   Constitutional:  Negative for chills, diaphoresis, fever and malaise/fatigue.   HENT:  Negative for nosebleeds.    Eyes:  Negative for blurred vision, double vision and photophobia.   Respiratory:  Negative for cough, hemoptysis, shortness of breath and wheezing.    Cardiovascular:  Negative for chest pain, palpitations, orthopnea, claudication, leg swelling and PND.   Gastrointestinal:  Negative for abdominal pain, blood in stool, heartburn, melena, nausea and vomiting.   Genitourinary:  Negative for flank pain and hematuria.   Musculoskeletal:  Negative for falls, joint pain, myalgias and neck pain.   Skin:  Negative for rash.   Neurological:  Negative for dizziness, seizures, loss of consciousness, weakness and headaches.   Endo/Heme/Allergies:  Negative for polydipsia. Does not bruise/bleed easily.   Psychiatric/Behavioral:  " Negative for depression and memory loss. The patient is not nervous/anxious.        PHYSICAL EXAM:     Vitals:    12/18/24 1034   BP: 122/72   Pulse: 61   Resp: 18    Body mass index is 27.11 kg/m².  Weight: 78.5 kg (173 lb 1 oz)   Height: 5' 7" (170.2 cm)     Physical Exam  Vitals reviewed.   Constitutional:       General: He is not in acute distress.     Appearance: Normal appearance. He is well-developed. He is not ill-appearing, toxic-appearing or diaphoretic.   HENT:      Head: Normocephalic and atraumatic.   Eyes:      General: No scleral icterus.     Extraocular Movements: Extraocular movements intact.      Conjunctiva/sclera: Conjunctivae normal.      Pupils: Pupils are equal, round, and reactive to light.   Neck:      Thyroid: No thyromegaly.      Vascular: Normal carotid pulses. No carotid bruit or JVD.      Trachea: Trachea normal.   Cardiovascular:      Rate and Rhythm: Bradycardia present. Rhythm irregularly irregular.      Heart sounds: S1 normal and S2 normal. Heart sounds are distant. No murmur heard.     No friction rub. No gallop.   Pulmonary:      Effort: Pulmonary effort is normal. No respiratory distress.      Breath sounds: Normal breath sounds. No stridor. No wheezing, rhonchi or rales.   Chest:      Chest wall: No tenderness.   Abdominal:      General: There is no distension.      Palpations: Abdomen is soft.   Musculoskeletal:         General: No swelling or tenderness.      Cervical back: Normal range of motion and neck supple. No edema or rigidity.      Right lower leg: No edema.      Left lower leg: No edema.      Comments: R shoulder limited ROM   Feet:      Right foot:      Skin integrity: No ulcer.      Left foot:      Skin integrity: No ulcer.   Skin:     General: Skin is warm and dry.      Coloration: Skin is not jaundiced.   Neurological:      General: No focal deficit present.      Mental Status: He is alert and oriented to person, place, and time.      Cranial Nerves: No cranial " nerve deficit.   Psychiatric:         Mood and Affect: Mood normal.         Speech: Speech normal.         Behavior: Behavior normal. Behavior is cooperative.         DATA:   EKG: (personally reviewed tracing(s))  12/18/24 AF 44, PRWP, similar to 6/10/24    Laboratory:  CBC:  Recent Labs   Lab 06/16/23  0953 06/24/23 2118 09/05/24  0920   WBC 5.42 7.09 5.67   Hemoglobin 11.7 L 10.8 L 15.2   Hematocrit 41.4 37.0 L 50.6   Platelets 308 245 226       CHEMISTRIES:  Recent Labs   Lab 04/28/22  0923 05/11/22  0722 06/09/22  0826 08/11/22  0931 06/16/23 0953 06/24/23 2118 07/24/24  0735 09/05/24  0920   Glucose 146 H  --  126 H   < > 112 H 81  --  91   Sodium 141  --  143   < > 140 140  --  141   Potassium 4.9  --  4.9   < > 4.6 4.2  --  5.1   BUN 30 H  --  33 H   < > 27 H 25 H  --  30 H   Creatinine 1.5 H 1.5 H 1.5 H   < > 1.5 H 1.4 1.6 H 1.6 H   eGFR if  53 A 53.0 A 53.0 A  --   --   --   --   --    eGFR if non  46 A 45.9 A 45.9 A  --   --   --   --   --    Calcium 9.7  --  10.2   < > 10.3 9.4  --  10.3    < > = values in this interval not displayed.       CARDIAC BIOMARKERS:  Recent Labs   Lab 06/24/23 2118 06/24/23  2329   Troponin I 0.050 H 0.054 H       COAGS:  Recent Labs   Lab 06/24/23  2217   INR 1.1       LIPIDS/LFTS:  Recent Labs   Lab 09/13/22  0658 06/16/23 0953 06/24/23 2118 07/24/24  0735 09/05/24  0920   Cholesterol 133 141  --  138  --    Triglycerides 144 104  --  195 H  --    HDL 25 L 28 L  --  29 L  --    LDL Cholesterol 79.2 92.2  --  70.0  --    Non-HDL Cholesterol 108 113  --  109  --    AST 18 18 22  --  18   ALT 12 13 15  --  17       Cardiovascular Testing:  Carotid US 9/10/24    There is 40-49% right Internal Carotid Stenosis.    There is 60-69% left Internal Carotid Stenosis.    >50% bilat ECA stenosis.    Compared with prior report dated 03/19/2024: left ICA stenosis has progressed; right ICA stenosis is stable.    Echo 4/2/24    Left Ventricle: The left  ventricle is normal in size. Mildly increased wall thickness. There is mild concentric hypertrophy. Septal motion is consistent with post-operative status. There is normal systolic function with a visually estimated ejection fraction of 55 - 60%. Unable to assess diastolic function due to atrial fibrillation.    Right Ventricle: Normal right ventricular cavity size. Systolic function is normal.    Pt in AFib throughout exam.    Holter 4/2/24    Atrial fibrillation    Heart rates varied between 31 and 103 BPM with an average of 57 BPM.    Longest R-R interval 3.6sec at 12:39am.    There were occasional PVCs totalling 1185 and averaging 24.69 per hour. There were 10 couplets. There were 3 bigeminal cycles.    The diary was not returned.    Ex MPI 3/13/23     The patient exercised for 8 minutes 16 seconds on a Marcial protocol, corresponding to a functional capacity of 9 METS, achieving a peak heart rate of 127 bpm, which is 86 % of the age predicted maximum heart rate. Their exercise capacity was above average.    Normal myocardial perfusion scan. There is no evidence of myocardial ischemia or infarction.    There is a mild to moderate intensity perfusion abnormality in the anteroseptal wall of the left ventricle consistent with the RV insertion site.    The gated perfusion images showed an ejection fraction of 66% post stress.    There is normal wall motion post stress.    The ECG portion of the study is negative for ischemia.    The patient reported no chest pain during the stress test.    During stress, rare PVCs are noted.    The exercise capacity was above average.    LE venous US 4/20/22  There is no evidence of a right lower extremity DVT.  There is no evidence of a left lower extremity DVT.  1.2x0.6cm non vascular structure noted in left proximal calf.    PCI OM1 12/10/20  1.  90% mid OM2 stenosis.  2.  Successful PCI with placement of a 2.25 x 26 mm drug-eluting stent reducing the 90% stenosis to 0%.  INGRIS 3  flow.  No dissection.  Good angiographic results.    Cath 11/11/20   Left Main   The vessel is angiographically normal.   Left Anterior Descending   Subtotal mid occlusion. Diffusely diseased distal vessel   Left Circumflex   Long mid 80% between OM1 and OM2   First Obtuse Marginal Branch   90% mid   Second Obtuse Marginal Branch   80% mid   Right Coronary Artery   80% mid - moderate disffuse distal disease   LIMA Graft To Mid LAD   Mid to distal graft diffusely diseased 0 multile 80-90% lesions. Small diffusely diseased distal LAD   Saphenous Graft To RPDA   Patent with good runoff to PDA   Graft To 1st Diag   Patent to D1 with good runoff     Ex NUSRAT 4/18/18  Resting NUSRAT:   The right ankle brachial index was 1.04 which is normal.   The left ankle brachial index was 1.08 which is normal.   The right TBI is 0.55.   The left TBI is 0.98.   Exercise NUSRAT:   Post exercise right ankle pressure was 113 mmHg, resulting in a right post-exercise NUSRAT of 0.67.   Post exercise left ankle pressure was 150 mmHg, resulting in a left post-exercise NUSRAT of 0.89.       ASSESSMENT:   # Chr AF on eliquis 5mg bid, HR controlled (?too slow).  Holter 3/2023 OK, no evidence of chronotropic incompetence on Ex MPI 3/2023. Fatigue resolved.  No significant bradycardia on holter 4/2024 (longest RR interval during sleeping hours).  # CAD s/p CABG/PCI OM 12/2020, asymtomatic.  MPI 3/2023 neg.  # HTN, controlled  # HFpEF, euvolemic  # CKD3a, elev creat/K+ with losartan, normalized when stopped losartan.  # HLP on atorva 80mg  # PAD, abnl NUSRAT 4/18/18  # DM  # carotid atherosclerosis (CUS 9/2024)  # L>R LE edema, resolved.  LE venous US 4/2022 neg.  # aortic atherosclerosis (CT Chest 10/29/18)  # preop CV eval prior to [facility].  The patient demonstrated good exercise capacity.    PLAN:   Cont med rx  Stop Plavix  Cont eliquis 5mg bid  The patient is at low cardiac risk for the planned neurosurgical procedure and associated anesthesia.  No further  preoperative cardiac testing is required.  I plan to discontinue his Plavix today.  It is acceptable to hold his Eliquis for 3-5 days prior to surgery and resume it as soon as possible thereafter.  RTC 6 months with surveillance Carotid US (June 2025)    The above documents medical care services that are part of ongoing care related to this patient's serious/complex condition (Code ) (AF, HTN, CAD/HFpEF).        Eze Purdy MD, FACC

## 2024-12-18 NOTE — TELEPHONE ENCOUNTER
Patient wants to inform you he does take gabapentin. He wants to make sure you know this because on his AVS it states you told him to stop taking that medication. silvia

## 2024-12-19 ENCOUNTER — ANESTHESIA EVENT (OUTPATIENT)
Dept: SURGERY | Facility: HOSPITAL | Age: 74
End: 2024-12-19
Payer: MEDICARE

## 2024-12-20 ENCOUNTER — HOSPITAL ENCOUNTER (OUTPATIENT)
Dept: RADIOLOGY | Facility: HOSPITAL | Age: 74
Discharge: HOME | End: 2024-12-20
Attending: OTOLARYNGOLOGY
Payer: MEDICARE

## 2024-12-20 ENCOUNTER — TELEPHONE (OUTPATIENT)
Dept: PREADMISSION TESTING | Facility: HOSPITAL | Age: 74
End: 2024-12-20
Payer: MEDICARE

## 2024-12-20 DIAGNOSIS — H90.3 ASYMMETRICAL SENSORINEURAL HEARING LOSS: ICD-10-CM

## 2024-12-20 PROCEDURE — 70553 MRI BRAIN STEM W/O & W/DYE: CPT | Mod: 26,HCNC,, | Performed by: RADIOLOGY

## 2024-12-20 PROCEDURE — 70553 MRI BRAIN STEM W/O & W/DYE: CPT | Mod: TC,HCNC

## 2024-12-20 PROCEDURE — 25500020 PHARM REV CODE 255: Mod: HCNC | Performed by: OTOLARYNGOLOGY

## 2024-12-20 PROCEDURE — A9585 GADOBUTROL INJECTION: HCPCS | Mod: HCNC | Performed by: OTOLARYNGOLOGY

## 2024-12-20 RX ORDER — GADOBUTROL 604.72 MG/ML
8 INJECTION INTRAVENOUS
Status: COMPLETED | OUTPATIENT
Start: 2024-12-20 | End: 2024-12-20

## 2024-12-20 RX ADMIN — GADOBUTROL 8 ML: 604.72 INJECTION INTRAVENOUS at 12:12

## 2024-12-26 ENCOUNTER — DOCUMENTATION ONLY (OUTPATIENT)
Dept: OTOLARYNGOLOGY | Facility: CLINIC | Age: 74
End: 2024-12-26
Payer: MEDICARE

## 2024-12-30 ENCOUNTER — HOSPITAL ENCOUNTER (OUTPATIENT)
Dept: RADIOLOGY | Facility: HOSPITAL | Age: 74
Discharge: HOME | End: 2024-12-30
Attending: STUDENT IN AN ORGANIZED HEALTH CARE EDUCATION/TRAINING PROGRAM
Payer: MEDICARE

## 2024-12-30 ENCOUNTER — HOSPITAL ENCOUNTER (OUTPATIENT)
Dept: PREADMISSION TESTING | Facility: HOSPITAL | Age: 74
Discharge: HOME | End: 2024-12-30
Attending: STUDENT IN AN ORGANIZED HEALTH CARE EDUCATION/TRAINING PROGRAM
Payer: MEDICARE

## 2024-12-30 VITALS
HEART RATE: 58 BPM | BODY MASS INDEX: 27.44 KG/M2 | HEIGHT: 67 IN | TEMPERATURE: 96 F | SYSTOLIC BLOOD PRESSURE: 153 MMHG | DIASTOLIC BLOOD PRESSURE: 74 MMHG | RESPIRATION RATE: 18 BRPM | WEIGHT: 174.81 LBS | OXYGEN SATURATION: 96 %

## 2024-12-30 DIAGNOSIS — Z01.818 PREOP TESTING: Primary | ICD-10-CM

## 2024-12-30 DIAGNOSIS — E11.9 DM (DIABETES MELLITUS): ICD-10-CM

## 2024-12-30 DIAGNOSIS — Z79.01 ANTICOAGULANT LONG-TERM USE: ICD-10-CM

## 2024-12-30 DIAGNOSIS — K11.8 MASS OF BOTH PAROTID GLANDS: Primary | ICD-10-CM

## 2024-12-30 LAB
ABO + RH BLD: NORMAL
ALBUMIN SERPL BCP-MCNC: 4.1 G/DL (ref 3.5–5.2)
ALP SERPL-CCNC: 24 U/L (ref 40–150)
ALT SERPL W/O P-5'-P-CCNC: 18 U/L (ref 10–44)
ANION GAP SERPL CALC-SCNC: 9 MMOL/L (ref 8–16)
APTT PPP: 50.1 SEC (ref 21–32)
AST SERPL-CCNC: 22 U/L (ref 10–40)
BASOPHILS # BLD AUTO: 0.05 K/UL (ref 0–0.2)
BASOPHILS NFR BLD: 0.8 % (ref 0–1.9)
BILIRUB SERPL-MCNC: 0.6 MG/DL (ref 0.1–1)
BLD GP AB SCN CELLS X3 SERPL QL: NORMAL
BUN SERPL-MCNC: 26 MG/DL (ref 8–23)
CALCIUM SERPL-MCNC: 9.8 MG/DL (ref 8.7–10.5)
CHLORIDE SERPL-SCNC: 106 MMOL/L (ref 95–110)
CO2 SERPL-SCNC: 26 MMOL/L (ref 23–29)
CREAT SERPL-MCNC: 1.5 MG/DL (ref 0.5–1.4)
DIFFERENTIAL METHOD BLD: ABNORMAL
EOSINOPHIL # BLD AUTO: 0.3 K/UL (ref 0–0.5)
EOSINOPHIL NFR BLD: 4.6 % (ref 0–8)
ERYTHROCYTE [DISTWIDTH] IN BLOOD BY AUTOMATED COUNT: 15.2 % (ref 11.5–14.5)
EST. GFR  (NO RACE VARIABLE): 48 ML/MIN/1.73 M^2
ESTIMATED AVG GLUCOSE: 134 MG/DL (ref 68–131)
GLUCOSE SERPL-MCNC: 98 MG/DL (ref 70–110)
HBA1C MFR BLD: 6.3 % (ref 4–5.6)
HCT VFR BLD AUTO: 46.8 % (ref 40–54)
HGB BLD-MCNC: 14.8 G/DL (ref 14–18)
IMM GRANULOCYTES # BLD AUTO: 0.02 K/UL (ref 0–0.04)
IMM GRANULOCYTES NFR BLD AUTO: 0.3 % (ref 0–0.5)
INR PPP: 3.3 (ref 0.8–1.2)
LYMPHOCYTES # BLD AUTO: 1.9 K/UL (ref 1–4.8)
LYMPHOCYTES NFR BLD: 29.2 % (ref 18–48)
MCH RBC QN AUTO: 26.9 PG (ref 27–31)
MCHC RBC AUTO-ENTMCNC: 31.6 G/DL (ref 32–36)
MCV RBC AUTO: 85 FL (ref 82–98)
MONOCYTES # BLD AUTO: 0.6 K/UL (ref 0.3–1)
MONOCYTES NFR BLD: 9.1 % (ref 4–15)
NEUTROPHILS # BLD AUTO: 3.6 K/UL (ref 1.8–7.7)
NEUTROPHILS NFR BLD: 56 % (ref 38–73)
NRBC BLD-RTO: 0 /100 WBC
PLATELET # BLD AUTO: 207 K/UL (ref 150–450)
PMV BLD AUTO: 9.7 FL (ref 9.2–12.9)
POTASSIUM SERPL-SCNC: 4.6 MMOL/L (ref 3.5–5.1)
PROT SERPL-MCNC: 7.5 G/DL (ref 6–8.4)
PROTHROMBIN TIME: 34.5 SEC (ref 9–12.5)
RBC # BLD AUTO: 5.51 M/UL (ref 4.6–6.2)
SODIUM SERPL-SCNC: 141 MMOL/L (ref 136–145)
WBC # BLD AUTO: 6.48 K/UL (ref 3.9–12.7)

## 2024-12-30 PROCEDURE — 83036 HEMOGLOBIN GLYCOSYLATED A1C: CPT | Mod: HCNC | Performed by: STUDENT IN AN ORGANIZED HEALTH CARE EDUCATION/TRAINING PROGRAM

## 2024-12-30 PROCEDURE — 85610 PROTHROMBIN TIME: CPT | Mod: HCNC | Performed by: STUDENT IN AN ORGANIZED HEALTH CARE EDUCATION/TRAINING PROGRAM

## 2024-12-30 PROCEDURE — 85025 COMPLETE CBC W/AUTO DIFF WBC: CPT | Mod: HCNC | Performed by: STUDENT IN AN ORGANIZED HEALTH CARE EDUCATION/TRAINING PROGRAM

## 2024-12-30 PROCEDURE — 85730 THROMBOPLASTIN TIME PARTIAL: CPT | Mod: HCNC | Performed by: STUDENT IN AN ORGANIZED HEALTH CARE EDUCATION/TRAINING PROGRAM

## 2024-12-30 PROCEDURE — 71046 X-RAY EXAM CHEST 2 VIEWS: CPT | Mod: TC,HCNC,FY

## 2024-12-30 PROCEDURE — 80053 COMPREHEN METABOLIC PANEL: CPT | Mod: HCNC | Performed by: STUDENT IN AN ORGANIZED HEALTH CARE EDUCATION/TRAINING PROGRAM

## 2024-12-30 PROCEDURE — 86901 BLOOD TYPING SEROLOGIC RH(D): CPT | Mod: HCNC | Performed by: STUDENT IN AN ORGANIZED HEALTH CARE EDUCATION/TRAINING PROGRAM

## 2024-12-30 PROCEDURE — 71046 X-RAY EXAM CHEST 2 VIEWS: CPT | Mod: 26,HCNC,, | Performed by: RADIOLOGY

## 2024-12-30 NOTE — ANESTHESIA PREPROCEDURE EVALUATION
12/30/2024  Driss Hernandez Jr. is a 74 y.o., male  To undergo Procedure(s) (LRB):  LAMINOFORAMINOTOMY, SPINE, MINIMALLY INVASIVE (Right)       Past Medical History:  Past Medical History:   Diagnosis Date    Chronic heart failure with preserved ejection fraction 2/9/2023    Chronic midline low back pain without sciatica 10/2/2017    Colon polyps     Coronary artery disease involving native coronary artery of native heart 3/5/2020    Coronary artery disease involving native coronary artery of native heart with angina pectoris 12/10/2020    Diabetes mellitus with neurological manifestations, uncontrolled 1/24/2017    Diabetic polyneuropathy associated with type 2 diabetes mellitus 1/24/2017    Diabetic polyneuropathy associated with type 2 diabetes mellitus     Essential hypertension 1/24/2017    Gastroesophageal reflux disease 1/24/2017    Hyperlipidemia 1/24/2017    Insomnia 1/24/2017    Long-term insulin use 1/24/2017    Longstanding persistent atrial fibrillation 12/30/2020    Lumbar spondylosis 11/13/2017    Nuclear sclerosis of both eyes 8/24/2017    Obesity     PAD (peripheral artery disease) 3/23/2022    Stage 3 chronic kidney disease 2/13/2019    Uncontrolled type 2 diabetes mellitus without complication, with long-term current use of insulin 1/24/2017       Past Surgical History:  Past Surgical History:   Procedure Laterality Date    ARTHROSCOPIC REPAIR OF ROTATOR CUFF OF SHOULDER Right 7/5/2022    Procedure: REPAIR, ROTATOR CUFF, ARTHROSCOPIC;  Surgeon: Valerie Olivera MD;  Location: [facility];  Service: Orthopedics;  Laterality: Right;  HETAL LEMUS [phone]/ TEXTED ALLAN ON 6/23/2022 @ 9:47AM. ALLAN RESPONDED ON 6/23/2022 @ 10:33AM-  JEAN PAUL BABIN [phone] MY BE HERE FOR CASE SPOKE TO HIM @ 9:59AM ON 7-1-2022  RN PREOP 6/28/2022    BACK SURGERY      2002    CATARACT  EXTRACTION W/  INTRAOCULAR LENS IMPLANT Right 08/29/2017    Dr. Hong    CATARACT EXTRACTION W/  INTRAOCULAR LENS IMPLANT Left 02/24/2022    Dr. Hong    CAUDAL EPIDURAL STEROID INJECTION N/A 9/25/2024    Procedure: Caudal Epidural Steroid Injection;  Surgeon: Eze Byrd Jr., MD;  Location: [facility] PAIN MANAGEMENT;  Service: Pain Management;  Laterality: N/A;  @1100(prev. 715)  Eliquis last 9/21  Plavix last 9/19  Check BG  E-Consent    COLONOSCOPY N/A 2/21/2017    Procedure: COLONOSCOPY;  Surgeon: Robb Rosado MD;  Location: [facility] ENDO;  Service: Endoscopy;  Laterality: N/A;    COLONOSCOPY N/A 7/5/2023    Procedure: COLONOSCOPY;  Surgeon: Aston Varela MD;  Location: [facility] ENDO;  Service: Endoscopy;  Laterality: N/A;  Referral: JOVANNI SCANLON  ok to hold Plavix 5 days and Eliquis 2 days per Dr Purdy-OFELIA  [facility]  Suprep   Inst portal   [facility]    CORONARY ANGIOGRAPHY INCLUDING BYPASS GRAFTS WITH CATHETERIZATION OF LEFT HEART N/A 11/11/2020    Procedure: ANGIOGRAM, CORONARY, INCLUDING BYPASS GRAFT, WITH LEFT HEART CATHETERIZATION;  Surgeon: Lane Cuellar MD;  Location: [facility] CATH LAB;  Service: Cardiology;  Laterality: N/A;  RN PRE OP 11-5-2020. --COVID NEGATIVE ON  11-. C A    CORONARY ARTERY BYPASS GRAFT      March 2016    EPIDURAL STEROID INJECTION Bilateral 11/14/2018    Procedure: Lumbar Medial Branch Blocks;  Surgeon: Eze Byrd Jr., MD;  Location: [facility] ENDO;  Service: Pain Management;  Laterality: Bilateral;  Bilateral Lumbar Medial Branch Blocks L2, L3, L4, L5    [id number]  14645    Arrive @ 1150; NO Sedation    EPIDURAL STEROID INJECTION Bilateral 11/28/2018    Procedure: Injection, Steroid, Epidural;  Surgeon: Eze Byrd Jr., MD;  Location: [facility] ENDO;  Service: Pain Management;  Laterality: Bilateral;  Bilateral Sacroiliac Joint Steroid Injections    [id number]    Arrive @ 0910    EPIDURAL STEROID INJECTION Bilateral 3/20/2019    Procedure: Injection, Steroid,  Sacroiliiac Joint;  Surgeon: Eze Byrd Jr., MD;  Location: [facility] ENDO;  Service: Pain Management;  Laterality: Bilateral;  Bilateral Sacroiliac Joint Steroid Injections    [id number]    Arrive @ 1145; Trigger point injections also?    EPIDURAL STEROID INJECTION Right 2023    Procedure: Right L5 Transforaminal Epidural Steroid Injection;  Surgeon: Eze Byrd Jr., MD;  Location: [facility] ENDO;  Service: Pain Management;  Laterality: Right;  @0830 (given)  Eliquis last   Plavix last   Check BG    EPIDURAL STEROID INJECTION Right 10/11/2023    Procedure: Right L3 (infraneural) + S1 Transforaminal Epidural Steroid Injections;  Surgeon: Eze Byrd Jr., MD;  Location: [facility] ENDO;  Service: Pain Management;  Laterality: Right;  @0745 (requests morning)  Eliquis 10/7 & Plavix 10/5  Check BG    ESOPHAGOGASTRODUODENOSCOPY N/A 2023    Procedure: EGD (ESOPHAGOGASTRODUODENOSCOPY);  Surgeon: Anita Oswald MD;  Location: [facility] ENDO;  Service: Endoscopy;  Laterality: N/A;  Procedure Timin-4 weeks  Provider: Any GI provider  Location: No Preference  Additional Scheduling Information: Blood thinners  Prep Specifications:N/A   ref. by Dr. Light, instr. to portal, ,  meds-st  ok to hold Plavix 5 days and Eliquis 2 day    INTRAOCULAR PROSTHESES INSERTION Left 2022    Procedure: INSERTION, IOL PROSTHESIS;  Surgeon: Nickolas Hong MD;  Location: [facility] OR;  Service: Ophthalmology;  Laterality: Left;    PHACOEMULSIFICATION OF CATARACT Left 2022    Procedure: PHACOEMULSIFICATION, CATARACT;  Surgeon: Nickolas Hong MD;  Location: [facility] OR;  Service: Ophthalmology;  Laterality: Left;  RN Phone Pre Op 22.  Covid NEGATIVE  22.  Arrival 08:00 am.    TONSILLECTOMY         Social History:  Social History     Socioeconomic History    Marital status:    Tobacco Use    Smoking status: Never     Passive exposure: Never    Smokeless tobacco: Never   Substance  and Sexual Activity    Alcohol use: Not Currently    Drug use: No    Sexual activity: Yes     Partners: Female     Social Drivers of Health     Financial Resource Strain: Low Risk  (1/22/2024)    Overall Financial Resource Strain (CARDIA)     Difficulty of Paying Living Expenses: Not hard at all   Food Insecurity: No Food Insecurity (1/22/2024)    Hunger Vital Sign     Worried About Running Out of Food in the Last Year: Never true     Ran Out of Food in the Last Year: Never true   Transportation Needs: No Transportation Needs (1/22/2024)    PRAPARE - Transportation     Lack of Transportation (Medical): No     Lack of Transportation (Non-Medical): No   Physical Activity: Insufficiently Active (1/22/2024)    Exercise Vital Sign     Days of Exercise per Week: 3 days     Minutes of Exercise per Session: 30 min   Stress: Stress Concern Present (1/22/2024)    [employer] [place] [employer] - Occupational Stress Questionnaire     Feeling of Stress : To some extent   Housing Stability: Low Risk  (1/22/2024)    Housing Stability Vital Sign     Unable to Pay for Housing in the Last Year: No     Number of Places Lived in the Last Year: 1     Unstable Housing in the Last Year: No       Medications:  Current Facility-Administered Medications on File Prior to Encounter   Medication Dose Route Frequency Provider Last Rate Last Admin    cyclopentolate 1% ophthalmic solution 1 drop  1 drop Left Eye On Call Procedure Nickolas Hong MD   1 drop at 02/24/22 0901    ofloxacin 0.3 % ophthalmic solution 1 drop  1 drop Left Eye On Call Procedure Nickolas Hong MD   2 drop at 02/24/22 1017    sodium chloride 0.9% flush 10 mL  10 mL Intravenous PRN Nickolas Hong MD        triamcinolone acetonide injection 40 mg  40 mg Intra-articular  Valerie Olivera MD   40 mg at 02/09/23 1030     Current Outpatient Medications on File Prior to Encounter   Medication Sig Dispense Refill    acetaminophen (TYLENOL)  650 MG TbSR Take 650 mg by mouth every 8 (eight) hours.      ascorbic acid, vitamin C, (VITAMIN C) 100 MG tablet Take 100 mg by mouth daily as needed.      azelastine (ASTELIN) 137 mcg (0.1 %) nasal spray 1 spray (137 mcg total) by Nasal route 2 (two) times daily. 30 mL 3    b complex vitamins tablet Take 1 tablet by mouth once daily.      coenzyme Q10 100 mg capsule Take 100 mg by mouth once daily.      dapagliflozin propanediol (FARXIGA) 5 mg Tab tablet Take 1 tablet (5 mg total) by mouth once daily. 90 tablet 3    fluticasone propionate (FLONASE) 50 mcg/actuation nasal spray 2 sprays (100 mcg total) by Each Nostril route 2 (two) times daily. 18.2 mL 3    furosemide (LASIX) 20 MG tablet TAKE 1 TABLET TWICE DAILY 180 tablet 3    glimepiride (AMARYL) 1 MG tablet TAKE 1 TABLET BEFORE BREAKFAST 90 tablet 3    hydrALAZINE (APRESOLINE) 50 MG tablet TAKE 1 TABLET TWICE DAILY 180 tablet 3    insulin degludec (TRESIBA FLEXTOUCH U-100) 100 unit/mL (3 mL) insulin pen Inject 20 Units into the skin once daily. 30 mL 4    isosorbide mononitrate (IMDUR) 120 MG 24 hr tablet Take 1 tablet (120 mg total) by mouth once daily. 90 tablet 3    magnesium 250 mg Tab Take 1 tablet by mouth once daily.       metFORMIN (GLUCOPHAGE-XR) 500 MG ER 24hr tablet Take 1 tablet with breakfast and 2 tablets with supper. 270 tablet 2    pantoprazole (PROTONIX) 40 MG tablet TAKE 1 TABLET EVERY DAY 90 tablet 3    triazolam (HALCION) 0.25 MG Tab TAKE 1 TABLET BY MOUTH EVERY NIGHT AT BEDTIME AS NEEDED 90 tablet 5    albuterol (PROVENTIL/VENTOLIN HFA) 90 mcg/actuation inhaler Inhale 2 puffs into the lungs every 4 (four) hours as needed for Shortness of Breath. Rescue 17 g 3    atorvastatin (LIPITOR) 80 MG tablet TAKE 1 TABLET EVERY EVENING 90 tablet 3    blood-glucose meter kit Test glucose 2-3x/day. True metrix 1 each 0    ELIQUIS 5 mg Tab TAKE 1 TABLET TWICE DAILY 180 tablet 3    ergocalciferol (ERGOCALCIFEROL) 50,000 unit Cap TAKE 1 CAPSULE BY MOUTH  "WEEKLY 12 capsule 0    ketoconazole (NIZORAL) 2 % cream Apply topically once daily. 60 g 6    lancets Misc Check blood glucose 2x/day. True metrix. E11.65 200 each 3    meclizine (ANTIVERT) 25 mg tablet Take 1 tablet (25 mg total) by mouth 3 (three) times daily as needed for Dizziness (or at least one HS for 1 week when vertigo active). 60 tablet 12    nitroGLYCERIN (NITROSTAT) 0.4 MG SL tablet Place 1 tablet (0.4 mg total) under the tongue every 5 (five) minutes as needed for Chest pain. 25 tablet 11    omega-3 acid ethyl esters (LOVAZA) 1 gram capsule Take 2 capsules (2 g total) by mouth 2 (two) times daily. 360 capsule 12    pen needle, diabetic 32 gauge x 1/4" Ndle Use daily 200 each 3    semaglutide (OZEMPIC) 2 mg/dose (8 mg/3 mL) PnIj Inject 2 mg into the skin every 7 days. 4 each 3       Allergies:  Review of patient's allergies indicates:   Allergen Reactions    Penicillins Other (See Comments)     Unknown reaction, Had a reaction as a child    Shrimp Itching     Hand Itching       Active Problems:  Patient Active Problem List   Diagnosis    DM type 2 without retinopathy    Diabetes mellitus with neurological manifestations, uncontrolled    Insomnia    Gastroesophageal reflux disease    Long-term insulin use    Mixed hyperlipidemia    Essential hypertension    Diabetic polyneuropathy associated with type 2 diabetes mellitus    Chronic right shoulder pain    Decreased strength of upper extremity    Decreased range of motion (ROM) of shoulder    Posture imbalance    Refractive error    Chronic midline low back pain without sciatica    Lumbar spondylosis    DDD (degenerative disc disease), lumbar    Postlaminectomy syndrome of lumbar region    Lumbar radiculopathy, right    Lumbar radiculopathy    Pseudophakia    Obesity (BMI 30.0-34.9)    Lumbosacral spondylosis    Bilateral sacroiliitis    Stage 3a chronic kidney disease    Diabetes mellitus due to underlying condition with stage 3 chronic kidney disease, " with long-term current use of insulin    Chest pain, atypical    Hx of CABG    Coronary artery disease involving native coronary artery of native heart    Aortic atherosclerosis    Sedative dependence    Pre-op testing    Coronary artery disease involving coronary bypass graft of native heart with unstable angina pectoris    Longstanding persistent atrial fibrillation    Long term (current) use of anticoagulants    Cervical radiculopathy    Cervical spondylosis    DDD (degenerative disc disease), cervical    Weakness of shoulder    Winging of scapula    Impaired mobility and ADLs    Atherosclerosis of both carotid arteries    PAD (peripheral artery disease)    History of percutaneous coronary intervention    Type 2 diabetes mellitus with hyperglycemia, with long-term current use of insulin    Nontraumatic incomplete tear of left rotator cuff    Decreased range of motion of right shoulder    Impaired strength of shoulder muscles    Chronic heart failure with preserved ejection fraction       Diagnostic Studies:   Latest Reference Range & Units 12/30/24 12:00   WBC 3.90 - 12.70 K/uL 6.48   RBC 4.60 - 6.20 M/uL 5.51   Hemoglobin 14.0 - 18.0 g/dL 14.8   Hematocrit 40.0 - 54.0 % 46.8   MCV 82 - 98 fL 85   MCH 27.0 - 31.0 pg 26.9 (L)   MCHC 32.0 - 36.0 g/dL 31.6 (L)   RDW 11.5 - 14.5 % 15.2 (H)   Platelet Count 150 - 450 K/uL 207   MPV 9.2 - 12.9 fL 9.7   Gran % 38.0 - 73.0 % 56.0   Lymph % 18.0 - 48.0 % 29.2   Mono % 4.0 - 15.0 % 9.1   Eos % 0.0 - 8.0 % 4.6   Basophil % 0.0 - 1.9 % 0.8   Immature Granulocytes 0.0 - 0.5 % 0.3   Gran # (ANC) 1.8 - 7.7 K/uL 3.6   Lymph # 1.0 - 4.8 K/uL 1.9   Mono # 0.3 - 1.0 K/uL 0.6   Eos # 0.0 - 0.5 K/uL 0.3   Baso # 0.00 - 0.20 K/uL 0.05   Immature Grans (Abs) 0.00 - 0.04 K/uL 0.02   nRBC 0 /100 WBC 0   Differential Method  Automated      Latest Reference Range & Units 12/30/24 12:00   Sodium 136 - 145 mmol/L 141   Potassium 3.5 - 5.1 mmol/L 4.6   Chloride 95 - 110 mmol/L 106   CO2 23 -  29 mmol/L 26   Anion Gap 8 - 16 mmol/L 9   BUN 8 - 23 mg/dL 26 (H)   Creatinine 0.5 - 1.4 mg/dL 1.5 (H)   eGFR >60 mL/min/1.73 m^2 48 !     EKG (12/18/24):  Atrial fibrillation with slow ventricular response   Right axis deviation   Anterior infarct ,age undetermined   ST and T wave abnormality, consider inferior ischemia     TTE (4/2/24):    Left Ventricle: The left ventricle is normal in size. Mildly increased wall thickness. There is mild concentric hypertrophy. Septal motion is consistent with post-operative status. There is normal systolic function with a visually estimated ejection fraction of 55 - 60%. Unable to assess diastolic function due to atrial fibrillation.    Right Ventricle: Normal right ventricular cavity size. Systolic function is normal.    Pt in AFib throughout exam.    24 Hour Vitals:  Temp:  [36.4 °C (97.6 °F)] 36.4 °C (97.6 °F)  Pulse:  [50] 50  Resp:  [16] 16  SpO2:  [100 %] 100 %  BP: (141)/(66) 141/66   See Nursing Charting For Additional Vitals      Pre-op Assessment    I have reviewed the Patient Summary Reports.     I have reviewed the Nursing Notes. I have reviewed the NPO Status.   I have reviewed the Medications.     Review of Systems  Anesthesia Hx:  No problems with previous Anesthesia             Denies Family Hx of Anesthesia complications.    Denies Personal Hx of Anesthesia complications.                    Social:  Non-Smoker, No Alcohol Use       Hematology/Oncology:    Oncology Normal                                   EENT/Dental:  EENT/Dental Normal           Cardiovascular:  Exercise tolerance: good   Hypertension   CAD   CABG/stent Dysrhythmias atrial fibrillation  Denies Angina. CHF   PVD hyperlipidemia   ECG has been reviewed. Eliquis                           Pulmonary:  Pulmonary Normal                       Renal/:  Chronic Renal Disease, CKD                Hepatic/GI:     GERD                Musculoskeletal:  Arthritis          Spine Disorders: lumbar           "  Neurological:  Neurology Normal                                      Endocrine:  Diabetes           Dermatological:  Skin Normal    Psych:  Psychiatric Normal                    Physical Exam  General: Well nourished, Cooperative, Oriented and Alert    Airway:  Mallampati: II   Mouth Opening: Normal  TM Distance: Normal  Tongue: Normal  Neck ROM: Normal ROM    Dental:  Intact    Chest/Lungs:  Normal Respiratory Rate    Heart:  Rate: Normal  Rhythm: Regular Rhythm        Anesthesia Plan  Type of Anesthesia, risks & benefits discussed:    Anesthesia Type: Gen ETT  Intra-op Monitoring Plan: Standard ASA Monitors  Induction:  IV  Informed Consent: Informed consent signed with the Patient and all parties understand the risks and agree with anesthesia plan.  All questions answered.   ASA Score: 3  Anesthesia Plan Notes: PMHx significant for A-Fib, CAD s/p CABG, HTN, HFpEF, CKD 3, HLP, PAD, DM, Carotid atherosclerosis  Cleared by Dr. Purdy- "The patient is at low cardiac risk for the planned neurosurgical procedure and associated anesthesia. No further preoperative cardiac testing is required. I plan to discontinue his Plavix today. It is acceptable to hold his Eliquis for 3-5 days prior to surgery and resume it as soon as possible thereafter.     Ready For Surgery From Anesthesia Perspective.     .      "

## 2024-12-30 NOTE — DISCHARGE INSTRUCTIONS
YOUR PROCEDURE WILL BE AT [facility] at [address] [place] [address]                 Enter through the Main Entrance facing Tamara Romano.                 Report to the Same Day Surgery Registration Desk in the hallway.(Just beside the Same Day Surgery Unit)      Your procedure  is scheduled for __1/6/2025________.    Call [phone] between 2pm and 5pm on _1/3/2025______to find out your arrival time for the day of surgery.    You may have two visitors.  No children under 12 years old.     You will be going to the Same Day Surgery Unit on the 2nd floor of the hospital.    Important instructions:  Do not eat anything after midnight.  You may have plain water, non carbonated.  You may also have Gatorade or Powerade after midnight.    Stop all fluids 2 hours before your surgery.    It is okay to brush your teeth.  Do not have gum, candy or mints.    SEE MEDICATION SHEET.   TAKE MEDICATIONS AS DIRECTED.      Do not take any diabetic medication on the morning of surgery unless instructed to do so by your doctor or pre op nurse.      All GLP-1 weekly diabetic/weight loss medications must not be taken for one week before your surgery, or your surgery could be canceled.      STOP taking for 7 days before surgery:    Aspirin           Voltaren (Diclofenac)  Ibuprofen  (Advil, Motrin)                Indomethocin  Mobic (meloxicam, celebrex)      Etodolac   Aleve (naproxen)          Toradol (ketoralac)  Fish oil, Krill oil and Vitamin E  Headache Powders (BC Powder, Goody's Powder, [place])                           You may take Tylenol if needed which is not a blood thinner.    Please shower the night before and the morning of your surgery.      Follow any Prep Instructions given by your surgeon.    Use Chlorhexidine soap as instructed by your pre op nurse.   Please place clean linens on your bed the night before surgery. Please wear fresh clean clothing after each shower.    No  shaving of procedural area at least 4-5 days before surgery due to increased risk of skin irritation and/or possible infection.    Female patients may be asked for a urine specimen on the morning of the surgery.  Please check with your nurse before using the restroom.    Contact lenses and removable denture work may not be worn during your procedure.    You may wear deodorant only. If you are having breast surgery, do not wear deodorant on the operative side.    Do not wear powder, body lotion, perfume/cologne or make-up.    Do not wear any jewelry or have any metal on your body.    You will be asked to remove any dentures or partials for the procedure.    If you are going home on the same day of surgery, you must arrange for a family member or a friend to drive you home.  Public transportation is prohibited.  You will not be able to drive home if you were given anesthesia or sedation.    Patients who want to have their Post-op prescriptions filled from our in-house [facility], bring a Credit/Debit Card or cash with you. A co-pay may be required.  The pharmacy closes at 5:30 pm.    Wear loose fitting clothes allowing for bandages.    Please leave money and valuables home.      You may bring your cell phone.    Call the doctor if fever or illness should occur before your surgery.    Call [phone] to contact us here if needed.                            CLOTHES ON DAY OF SURGERY    SHOULDER surgery:  you must have a very oversized shirt.  Very, Very large.  You will probably have a large sling on with your arm strapped to your chest.  You will not be able to put the arm of the operated shoulder into a sleeve.  You can put the arm of the un-operated shoulder into the sleeve, but the shirt will need to be draped over the operated shoulder.       ARM or HAND surgery:  make sure that your sleeves are large and loose enough to pass over large dressings or cast.      BREAST or UNDERARM surgery:  wear a loose, button  down shirt so that you can dress without raising your arms over your head.    ABDOMINAL surgery:  wear loose, comfortable clothing.  Nothing tight around the abdomen.  NO JEANS    PENIS or SCROTAL surgery:  loose comfortable clothing.  Large sweat pants, pajama pants or a robe.  ABSOLUTELY NO JEANS      LEG or FOOT surgery:  wear large loose pants that are able to pass over any large dressings or casts.  You could also wear loose shorts or a skirt.

## 2025-01-03 ENCOUNTER — TELEPHONE (OUTPATIENT)
Dept: SURGERY | Facility: HOSPITAL | Age: 75
End: 2025-01-03
Payer: MEDICARE

## 2025-01-03 NOTE — PRE ADMISSION SCREENING
Luis M Ellis MD [place], Patti A, RN  Caller: Unspecified (Yesterday,  8:33 AM)  Thank you saurabh. He should be holding his eliquis so I think all will be well.          Previous Messages       ----- Message -----  From: Saurabh Marr RN  Sent: 1/2/2025   8:34 AM CST  To: Luis M Ellis MD  Subject: Abn. Labs                                        Hello, please check the labs on this pt--surgery is 1/6/25--thanksSaurabh

## 2025-01-06 ENCOUNTER — PATIENT MESSAGE (OUTPATIENT)
Dept: NEUROSURGERY | Facility: HOSPITAL | Age: 75
End: 2025-01-06
Payer: MEDICARE

## 2025-01-06 ENCOUNTER — HOSPITAL ENCOUNTER (OUTPATIENT)
Facility: HOSPITAL | Age: 75
Discharge: HOME | End: 2025-01-06
Attending: STUDENT IN AN ORGANIZED HEALTH CARE EDUCATION/TRAINING PROGRAM | Admitting: STUDENT IN AN ORGANIZED HEALTH CARE EDUCATION/TRAINING PROGRAM
Payer: MEDICARE

## 2025-01-06 VITALS
DIASTOLIC BLOOD PRESSURE: 75 MMHG | BODY MASS INDEX: 27.28 KG/M2 | WEIGHT: 174.19 LBS | HEART RATE: 60 BPM | TEMPERATURE: 98 F | OXYGEN SATURATION: 95 % | SYSTOLIC BLOOD PRESSURE: 158 MMHG | RESPIRATION RATE: 16 BRPM

## 2025-01-06 DIAGNOSIS — Z98.890 S/P LUMBAR LAMINECTOMY: Primary | ICD-10-CM

## 2025-01-06 DIAGNOSIS — E11.65 TYPE 2 DIABETES MELLITUS WITH HYPERGLYCEMIA, WITH LONG-TERM CURRENT USE OF INSULIN: ICD-10-CM

## 2025-01-06 DIAGNOSIS — E78.2 MIXED HYPERLIPIDEMIA: ICD-10-CM

## 2025-01-06 DIAGNOSIS — I50.32 CHRONIC HEART FAILURE WITH PRESERVED EJECTION FRACTION: ICD-10-CM

## 2025-01-06 DIAGNOSIS — Z79.4 TYPE 2 DIABETES MELLITUS WITH HYPERGLYCEMIA, WITH LONG-TERM CURRENT USE OF INSULIN: ICD-10-CM

## 2025-01-06 DIAGNOSIS — M51.16 LUMBAR DISC HERNIATION WITH RADICULOPATHY: ICD-10-CM

## 2025-01-06 LAB
APTT PPP: 25.4 SEC (ref 21–32)
INR PPP: 1.1 (ref 0.8–1.2)
POCT GLUCOSE: 104 MG/DL (ref 70–110)
PROTHROMBIN TIME: 12.2 SEC (ref 9–12.5)

## 2025-01-06 PROCEDURE — 27201423 OPTIME MED/SURG SUP & DEVICES STERILE SUPPLY: Mod: HCNC | Performed by: STUDENT IN AN ORGANIZED HEALTH CARE EDUCATION/TRAINING PROGRAM

## 2025-01-06 PROCEDURE — 36000711: Mod: HCNC | Performed by: STUDENT IN AN ORGANIZED HEALTH CARE EDUCATION/TRAINING PROGRAM

## 2025-01-06 PROCEDURE — 25000003 PHARM REV CODE 250: Mod: HCNC | Performed by: ANESTHESIOLOGY

## 2025-01-06 PROCEDURE — 63600175 PHARM REV CODE 636 W HCPCS: Mod: HCNC | Performed by: NURSE ANESTHETIST, CERTIFIED REGISTERED

## 2025-01-06 PROCEDURE — 85610 PROTHROMBIN TIME: CPT | Mod: HCNC | Performed by: STUDENT IN AN ORGANIZED HEALTH CARE EDUCATION/TRAINING PROGRAM

## 2025-01-06 PROCEDURE — 85730 THROMBOPLASTIN TIME PARTIAL: CPT | Mod: HCNC | Performed by: STUDENT IN AN ORGANIZED HEALTH CARE EDUCATION/TRAINING PROGRAM

## 2025-01-06 PROCEDURE — 63600175 PHARM REV CODE 636 W HCPCS: Mod: HCNC | Performed by: ANESTHESIOLOGY

## 2025-01-06 PROCEDURE — 63047 LAM FACETEC & FORAMOT LUMBAR: CPT | Mod: AS,HCNC,, | Performed by: PHYSICIAN ASSISTANT

## 2025-01-06 PROCEDURE — 63047 LAM FACETEC & FORAMOT LUMBAR: CPT | Mod: HCNC,,, | Performed by: STUDENT IN AN ORGANIZED HEALTH CARE EDUCATION/TRAINING PROGRAM

## 2025-01-06 PROCEDURE — 25000003 PHARM REV CODE 250: Mod: HCNC | Performed by: PHYSICIAN ASSISTANT

## 2025-01-06 PROCEDURE — 63600175 PHARM REV CODE 636 W HCPCS: Mod: HCNC | Performed by: STUDENT IN AN ORGANIZED HEALTH CARE EDUCATION/TRAINING PROGRAM

## 2025-01-06 PROCEDURE — 36000710: Mod: HCNC | Performed by: STUDENT IN AN ORGANIZED HEALTH CARE EDUCATION/TRAINING PROGRAM

## 2025-01-06 PROCEDURE — 25000003 PHARM REV CODE 250: Mod: HCNC | Performed by: STUDENT IN AN ORGANIZED HEALTH CARE EDUCATION/TRAINING PROGRAM

## 2025-01-06 PROCEDURE — 71000016 HC POSTOP RECOV ADDL HR: Mod: HCNC | Performed by: STUDENT IN AN ORGANIZED HEALTH CARE EDUCATION/TRAINING PROGRAM

## 2025-01-06 PROCEDURE — 36415 COLL VENOUS BLD VENIPUNCTURE: CPT | Mod: HCNC | Performed by: STUDENT IN AN ORGANIZED HEALTH CARE EDUCATION/TRAINING PROGRAM

## 2025-01-06 PROCEDURE — 71000033 HC RECOVERY, INTIAL HOUR: Mod: HCNC | Performed by: STUDENT IN AN ORGANIZED HEALTH CARE EDUCATION/TRAINING PROGRAM

## 2025-01-06 PROCEDURE — 25000003 PHARM REV CODE 250: Mod: HCNC | Performed by: NURSE ANESTHETIST, CERTIFIED REGISTERED

## 2025-01-06 PROCEDURE — 71000015 HC POSTOP RECOV 1ST HR: Mod: HCNC | Performed by: STUDENT IN AN ORGANIZED HEALTH CARE EDUCATION/TRAINING PROGRAM

## 2025-01-06 PROCEDURE — 63600175 PHARM REV CODE 636 W HCPCS: Mod: HCNC | Performed by: PHYSICIAN ASSISTANT

## 2025-01-06 PROCEDURE — 37000009 HC ANESTHESIA EA ADD 15 MINS: Mod: HCNC | Performed by: STUDENT IN AN ORGANIZED HEALTH CARE EDUCATION/TRAINING PROGRAM

## 2025-01-06 PROCEDURE — 37000008 HC ANESTHESIA 1ST 15 MINUTES: Mod: HCNC | Performed by: STUDENT IN AN ORGANIZED HEALTH CARE EDUCATION/TRAINING PROGRAM

## 2025-01-06 RX ORDER — ACETAMINOPHEN 500 MG
1000 TABLET ORAL
Status: COMPLETED | OUTPATIENT
Start: 2025-01-06 | End: 2025-01-06

## 2025-01-06 RX ORDER — GLUCAGON 1 MG
1 KIT INJECTION
Status: DISCONTINUED | OUTPATIENT
Start: 2025-01-06 | End: 2025-01-06 | Stop reason: HOSPADM

## 2025-01-06 RX ORDER — EPINEPHRINE 0.22MG
100 AEROSOL WITH ADAPTER (ML) INHALATION DAILY
Status: ON HOLD
Start: 2025-01-11

## 2025-01-06 RX ORDER — SODIUM CHLORIDE, SODIUM LACTATE, POTASSIUM CHLORIDE, CALCIUM CHLORIDE 600; 310; 30; 20 MG/100ML; MG/100ML; MG/100ML; MG/100ML
INJECTION, SOLUTION INTRAVENOUS CONTINUOUS
Status: DISCONTINUED | OUTPATIENT
Start: 2025-01-06 | End: 2025-01-06 | Stop reason: HOSPADM

## 2025-01-06 RX ORDER — HYDROMORPHONE HYDROCHLORIDE 2 MG/ML
0.2 INJECTION, SOLUTION INTRAMUSCULAR; INTRAVENOUS; SUBCUTANEOUS EVERY 5 MIN PRN
Status: DISCONTINUED | OUTPATIENT
Start: 2025-01-06 | End: 2025-01-06 | Stop reason: HOSPADM

## 2025-01-06 RX ORDER — DEXTROMETHORPHAN HYDROBROMIDE, GUAIFENESIN 5; 100 MG/5ML; MG/5ML
650 LIQUID ORAL EVERY 8 HOURS
Status: ON HOLD | COMMUNITY

## 2025-01-06 RX ORDER — OMEGA-3-ACID ETHYL ESTERS 1 G/1
2 CAPSULE, LIQUID FILLED ORAL 2 TIMES DAILY
Status: ON HOLD
Start: 2025-01-11

## 2025-01-06 RX ORDER — ONDANSETRON 4 MG/1
4 TABLET, ORALLY DISINTEGRATING ORAL EVERY 6 HOURS PRN
Qty: 15 TABLET | Refills: 0 | Status: ON HOLD | OUTPATIENT
Start: 2025-01-06

## 2025-01-06 RX ORDER — ONDANSETRON HYDROCHLORIDE 2 MG/ML
INJECTION, SOLUTION INTRAVENOUS
Status: DISCONTINUED | OUTPATIENT
Start: 2025-01-06 | End: 2025-01-06

## 2025-01-06 RX ORDER — PROPOFOL 10 MG/ML
VIAL (ML) INTRAVENOUS
Status: DISCONTINUED | OUTPATIENT
Start: 2025-01-06 | End: 2025-01-06

## 2025-01-06 RX ORDER — MIDAZOLAM HYDROCHLORIDE 1 MG/ML
INJECTION INTRAMUSCULAR; INTRAVENOUS
Status: DISCONTINUED | OUTPATIENT
Start: 2025-01-06 | End: 2025-01-06

## 2025-01-06 RX ORDER — HYDROCODONE BITARTRATE AND ACETAMINOPHEN 10; 325 MG/1; MG/1
1 TABLET ORAL EVERY 4 HOURS PRN
Qty: 42 TABLET | Refills: 0 | Status: ON HOLD | OUTPATIENT
Start: 2025-01-06

## 2025-01-06 RX ORDER — FENTANYL CITRATE 50 UG/ML
INJECTION, SOLUTION INTRAMUSCULAR; INTRAVENOUS
Status: DISCONTINUED | OUTPATIENT
Start: 2025-01-06 | End: 2025-01-06

## 2025-01-06 RX ORDER — GENTAMICIN 40 MG/ML
INJECTION, SOLUTION INTRAMUSCULAR; INTRAVENOUS
Status: DISCONTINUED | OUTPATIENT
Start: 2025-01-06 | End: 2025-01-06 | Stop reason: HOSPADM

## 2025-01-06 RX ORDER — MUPIROCIN 20 MG/G
1 OINTMENT TOPICAL
Status: COMPLETED | OUTPATIENT
Start: 2025-01-06 | End: 2025-01-06

## 2025-01-06 RX ORDER — SODIUM CHLORIDE 9 MG/ML
INJECTION, SOLUTION INTRAVENOUS CONTINUOUS
Status: DISCONTINUED | OUTPATIENT
Start: 2025-01-06 | End: 2025-01-06 | Stop reason: HOSPADM

## 2025-01-06 RX ORDER — METHOCARBAMOL 750 MG/1
750 TABLET, FILM COATED ORAL 3 TIMES DAILY PRN
Qty: 60 TABLET | Refills: 0 | Status: ON HOLD | OUTPATIENT
Start: 2025-01-06

## 2025-01-06 RX ORDER — EPHEDRINE SULFATE 50 MG/ML
INJECTION, SOLUTION INTRAVENOUS
Status: DISCONTINUED | OUTPATIENT
Start: 2025-01-06 | End: 2025-01-06

## 2025-01-06 RX ORDER — DEXAMETHASONE SODIUM PHOSPHATE 4 MG/ML
INJECTION, SOLUTION INTRA-ARTICULAR; INTRALESIONAL; INTRAMUSCULAR; INTRAVENOUS; SOFT TISSUE
Status: DISCONTINUED | OUTPATIENT
Start: 2025-01-06 | End: 2025-01-06

## 2025-01-06 RX ORDER — METHYLPREDNISOLONE ACETATE 40 MG/ML
INJECTION, SUSPENSION INTRA-ARTICULAR; INTRALESIONAL; INTRAMUSCULAR; SOFT TISSUE
Status: DISCONTINUED | OUTPATIENT
Start: 2025-01-06 | End: 2025-01-06 | Stop reason: HOSPADM

## 2025-01-06 RX ORDER — LIDOCAINE HYDROCHLORIDE 20 MG/ML
INJECTION INTRAVENOUS
Status: DISCONTINUED | OUTPATIENT
Start: 2025-01-06 | End: 2025-01-06

## 2025-01-06 RX ORDER — ROCURONIUM BROMIDE 10 MG/ML
INJECTION, SOLUTION INTRAVENOUS
Status: DISCONTINUED | OUTPATIENT
Start: 2025-01-06 | End: 2025-01-06

## 2025-01-06 RX ORDER — MUPIROCIN 20 MG/G
OINTMENT TOPICAL
Status: DISCONTINUED | OUTPATIENT
Start: 2025-01-06 | End: 2025-01-06 | Stop reason: SDUPTHER

## 2025-01-06 RX ORDER — LIDOCAINE HYDROCHLORIDE 10 MG/ML
1 INJECTION, SOLUTION EPIDURAL; INFILTRATION; INTRACAUDAL; PERINEURAL ONCE
Status: DISCONTINUED | OUTPATIENT
Start: 2025-01-06 | End: 2025-01-06 | Stop reason: HOSPADM

## 2025-01-06 RX ORDER — SODIUM CHLORIDE 0.9 % (FLUSH) 0.9 %
10 SYRINGE (ML) INJECTION
Status: DISCONTINUED | OUTPATIENT
Start: 2025-01-06 | End: 2025-01-06 | Stop reason: HOSPADM

## 2025-01-06 RX ORDER — LIDOCAINE HYDROCHLORIDE AND EPINEPHRINE 10; 10 UG/ML; MG/ML
INJECTION, SOLUTION INFILTRATION; PERINEURAL
Status: DISCONTINUED | OUTPATIENT
Start: 2025-01-06 | End: 2025-01-06 | Stop reason: HOSPADM

## 2025-01-06 RX ADMIN — EPHEDRINE SULFATE 10 MG: 50 INJECTION INTRAVENOUS at 07:01

## 2025-01-06 RX ADMIN — ONDANSETRON 4 MG: 2 INJECTION, SOLUTION INTRAMUSCULAR; INTRAVENOUS at 09:01

## 2025-01-06 RX ADMIN — SODIUM CHLORIDE, SODIUM LACTATE, POTASSIUM CHLORIDE, AND CALCIUM CHLORIDE: .6; .31; .03; .02 INJECTION, SOLUTION INTRAVENOUS at 07:01

## 2025-01-06 RX ADMIN — ROCURONIUM BROMIDE 30 MG: 10 INJECTION, SOLUTION INTRAVENOUS at 07:01

## 2025-01-06 RX ADMIN — HYDROMORPHONE HYDROCHLORIDE 0.2 MG: 2 INJECTION INTRAMUSCULAR; INTRAVENOUS; SUBCUTANEOUS at 10:01

## 2025-01-06 RX ADMIN — ACETAMINOPHEN 1000 MG: 500 TABLET ORAL at 06:01

## 2025-01-06 RX ADMIN — PROPOFOL 130 MG: 10 INJECTION, EMULSION INTRAVENOUS at 07:01

## 2025-01-06 RX ADMIN — FENTANYL CITRATE 25 MCG: 50 INJECTION, SOLUTION INTRAMUSCULAR; INTRAVENOUS at 07:01

## 2025-01-06 RX ADMIN — GLYCOPYRROLATE 0.1 MG: 0.2 INJECTION, SOLUTION INTRAMUSCULAR; INTRAVITREAL at 07:01

## 2025-01-06 RX ADMIN — LIDOCAINE HYDROCHLORIDE 100 MG: 20 INJECTION, SOLUTION INTRAVENOUS at 07:01

## 2025-01-06 RX ADMIN — ROCURONIUM BROMIDE 10 MG: 10 INJECTION, SOLUTION INTRAVENOUS at 08:01

## 2025-01-06 RX ADMIN — MUPIROCIN 1 G: 20 OINTMENT TOPICAL at 06:01

## 2025-01-06 RX ADMIN — VANCOMYCIN HYDROCHLORIDE 1000 MG: 1 INJECTION, POWDER, LYOPHILIZED, FOR SOLUTION INTRAVENOUS at 07:01

## 2025-01-06 RX ADMIN — FENTANYL CITRATE 50 MCG: 50 INJECTION, SOLUTION INTRAMUSCULAR; INTRAVENOUS at 07:01

## 2025-01-06 RX ADMIN — DEXAMETHASONE SODIUM PHOSPHATE 8 MG: 4 INJECTION, SOLUTION INTRAMUSCULAR; INTRAVENOUS at 07:01

## 2025-01-06 RX ADMIN — SUGAMMADEX 200 MG: 100 INJECTION, SOLUTION INTRAVENOUS at 09:01

## 2025-01-06 RX ADMIN — FENTANYL CITRATE 25 MCG: 50 INJECTION, SOLUTION INTRAMUSCULAR; INTRAVENOUS at 09:01

## 2025-01-06 RX ADMIN — ROCURONIUM BROMIDE 60 MG: 10 INJECTION, SOLUTION INTRAVENOUS at 07:01

## 2025-01-06 RX ADMIN — MIDAZOLAM HYDROCHLORIDE 2 MG: 1 INJECTION INTRAMUSCULAR; INTRAVENOUS at 07:01

## 2025-01-06 NOTE — BRIEF OP NOTE
[facility] [place] - Surgery  Brief Operative Note    SUMMARY     Surgery Date: 1/6/2025     Surgeons and Role:     * Luis M Ellis MD - Primary    Assisting Surgeon: Bing Antonio PA-C     Pre-op Diagnosis:  Lumbar radiculopathy [M54.16]    Post-op Diagnosis:  Post-Op Diagnosis Codes:     * Lumbar radiculopathy [M54.16]    Procedure(s) (LRB):  LAMINOFORAMINOTOMY, SPINE, MINIMALLY INVASIVE (Right)    Anesthesia: General    Implants:  * No implants in log *    Operative Findings: MIS Right L4-5 laminoforaminotomy     Estimated Blood Loss: 15 mL    Estimated Blood Loss has been documented.         Specimens:   Specimen (24h ago, onward)      None            [id number]

## 2025-01-06 NOTE — DISCHARGE SUMMARY
[facility] - Surgery  Neurosurgery  Discharge Summary      Patient Name: Driss Hernandez Jr.  MRN: [id number]  Admission Date: 1/6/2025  Hospital Length of Stay: 0 days  Discharge Date and Time: 1/6/2025 12:35 PM  Attending Physician: Luis M Ellis MD    Discharging Provider: Bing Antonio PA-C  Primary Care Provider: Jono Willoughby MD     HPI:   Driss Hernandez Jr. is a 74 y.o. male past medical history significant for coronary artery disease status post bypass, diabetes mellitus (A1C 6.2), prior right L4/5 laminectomy 15 years ago, who presents for evaluation of 8 months of right lower extremity pain.     Patient states he has been dealing with this for about 8 months.  He has had numerous epidural steroid injections which have provided some relief.  He currently takes a sleeping pill at night followed by 3 gabapentin pills and this helps him get to bed comfortably, but he wakes up around 3:00 a.m. with severe right lower extremity pain.  He describes this is coming down the right leg to the anterior lower leg toward the anterior shin and ankle.  This sounds like a combination of L4 and L5.     So far this year, he has been through physical therapy as well as numerous epidural steroid injections, most recently a caudal epidural.  This did provide some pain relief.  However, he has severe right lower extremity pain which bothers him significantly.  He does have low back pain which is something he has dealt with for years.  It is worse when he is sitting.  It is okay when he is up and moving.  He states that if his right lower extremity pain could be improved this would be a successful surgical result for him.    Procedure(s) (LRB):  LAMINOFORAMINOTOMY, SPINE, MINIMALLY INVASIVE (Right)     Hospital Course:   1/6: MIS Right L4-5 laminectomy with Dr. Ellis. Tolerated procedure well. Moving all extremities in PACU and reporting no pain. Discharge instructions reviewed with patient. All  questions answered. Prescriptions sent to hospital pharmacy for bedside delivery. Discharged home.       Pending Diagnostic Studies:       None          Final Active Diagnoses:    Diagnosis Date Noted POA    PRINCIPAL PROBLEM:  S/P lumbar laminectomy [Z98.890] 01/06/2025 Not Applicable      Problems Resolved During this Admission:      Discharged Condition: stable    Disposition: Home or Self Care    Follow Up:   Follow-up Information       Bing Antonio PA-C Follow up on 1/22/2025.    Specialty: Neurosurgery  Why: For wound re-check at 10:40am  Contact information:  [address]  [address]  [facility] [address]  [phone]                           Patient Instructions:      Diet diabetic     Lifting restrictions     Other restrictions (specify):     No driving until:      Remove dressing in 72 hours     Notify your health care provider if you experience any of the following:  temperature >100.4     Notify your health care provider if you experience any of the following:  persistent nausea and vomiting or diarrhea     Notify your health care provider if you experience any of the following:  severe uncontrolled pain     Notify your health care provider if you experience any of the following:  redness, tenderness, or signs of infection (pain, swelling, redness, odor or green/yellow discharge around incision site)     Notify your health care provider if you experience any of the following:  difficulty breathing or increased cough     Notify your health care provider if you experience any of the following:  severe persistent headache     Notify your health care provider if you experience any of the following:  worsening rash     Notify your health care provider if you experience any of the following:  persistent dizziness, light-headedness, or visual disturbances     Notify your health care provider if you experience any of the following:  increased confusion or weakness     Shower on day dressing removed (No  bath)     Medications:  Reconciled Home Medications:      Medication List        START taking these medications      HYDROcodone-acetaminophen  mg per tablet  Commonly known as: NORCO  Take 1 tablet by mouth every 4 (four) hours as needed for Pain (7-10/10 pain).     methocarbamoL 750 MG Tab  Commonly known as: ROBAXIN  Take 1 tablet (750 mg total) by mouth 3 (three) times daily as needed (muscle spasms).     ondansetron 4 MG Tbdl  Commonly known as: ZOFRAN-ODT  Dissolvxe 1 tablet (4 mg total) by mouth every 6 (six) hours as needed (nausea).            CHANGE how you take these medications      apixaban 5 mg Tab  Commonly known as: ELIQUIS  Take 1 tablet (5 mg total) by mouth 2 (two) times daily.  Start taking on: January 11, 2025  What changed: These instructions start on January 11, 2025. If you are unsure what to do until then, ask your doctor or other care provider.     coenzyme Q10 100 mg capsule  Take 1 capsule (100 mg total) by mouth once daily.  Start taking on: January 11, 2025  What changed: These instructions start on January 11, 2025. If you are unsure what to do until then, ask your doctor or other care provider.     omega-3 acid ethyl esters 1 gram capsule  Commonly known as: LOVAZA  Take 2 capsules (2 g total) by mouth 2 (two) times daily.  Start taking on: January 11, 2025  What changed: These instructions start on January 11, 2025. If you are unsure what to do until then, ask your doctor or other care provider.            CONTINUE taking these medications      acetaminophen 650 MG Tbsr  Commonly known as: TYLENOL  Take 650 mg by mouth every 8 (eight) hours.     albuterol 90 mcg/actuation inhaler  Commonly known as: PROVENTIL/VENTOLIN HFA  Inhale 2 puffs into the lungs every 4 (four) hours as needed for Shortness of Breath. Rescue     ascorbic acid (vitamin C) 100 MG tablet  Commonly known as: VITAMIN C  Take 100 mg by mouth daily as needed.     atorvastatin 80 MG tablet  Commonly known as:  LIPITOR  TAKE 1 TABLET EVERY EVENING     azelastine 137 mcg (0.1 %) nasal spray  Commonly known as: ASTELIN  1 spray (137 mcg total) by Nasal route 2 (two) times daily.     b complex vitamins tablet  Take 1 tablet by mouth once daily.     blood-glucose meter kit  Test glucose 2-3x/day. True metrix     dapagliflozin propanediol 5 mg Tab tablet  Commonly known as: Farxiga  Take 1 tablet (5 mg total) by mouth once daily.     ergocalciferol 50,000 unit Cap  Commonly known as: ERGOCALCIFEROL  TAKE 1 CAPSULE BY MOUTH WEEKLY     fenofibrate 160 MG Tab  TAKE 1 TABLET EVERY MORNING     fluticasone propionate 50 mcg/actuation nasal spray  Commonly known as: FLONASE  2 sprays (100 mcg total) by Each Nostril route 2 (two) times daily.     furosemide 20 MG tablet  Commonly known as: LASIX  TAKE 1 TABLET TWICE DAILY     gabapentin 300 MG capsule  Commonly known as: NEURONTIN  Take 2 capsules (600 mg total) by mouth 2 (two) times daily.     glimepiride 1 MG tablet  Commonly known as: AMARYL  TAKE 1 TABLET BEFORE BREAKFAST     hydrALAZINE 50 MG tablet  Commonly known as: APRESOLINE  TAKE 1 TABLET TWICE DAILY     insulin degludec 100 unit/mL (3 mL) insulin pen  Commonly known as: TRESIBA FLEXTOUCH U-100  Inject 20 Units into the skin once daily.     isosorbide mononitrate 120 MG 24 hr tablet  Commonly known as: IMDUR  Take 1 tablet (120 mg total) by mouth once daily.     ketoconazole 2 % cream  Commonly known as: NIZORAL  Apply topically once daily.     lancets Misc  Check blood glucose 2x/day. True metrix. E11.65     magnesium 250 mg Tab  Take 1 tablet by mouth once daily.     meclizine 25 mg tablet  Commonly known as: ANTIVERT  Take 1 tablet (25 mg total) by mouth 3 (three) times daily as needed for Dizziness (or at least one HS for 1 week when vertigo active).     metFORMIN 500 MG ER 24hr tablet  Commonly known as: GLUCOPHAGE-XR  Take 1 tablet with breakfast and 2 tablets with supper.     nitroGLYCERIN 0.4 MG SL tablet  Commonly  "known as: NITROSTAT  Place 1 tablet (0.4 mg total) under the tongue every 5 (five) minutes as needed for Chest pain.     NOVOFINE 32 32 gauge x 1/4" Ndle  Generic drug: pen needle, diabetic  Use daily     OZEMPIC 2 mg/dose (8 mg/3 mL) Pnij  Generic drug: semaglutide  Inject 2 mg into the skin every 7 days.     pantoprazole 40 MG tablet  Commonly known as: PROTONIX  TAKE 1 TABLET EVERY DAY     triazolam 0.25 MG tablet  TAKE 1 TABLET BY MOUTH EVERY NIGHT AT BEDTIME AS NEEDED              Bing Antonio PA-C  Neurosurgery  [facility] - Surgery    "

## 2025-01-06 NOTE — DISCHARGE INSTRUCTIONS
"If you have any questions about this form, please call [phone] or send us a message on [employer].    Activity Restrictions:  [x]  Return to work will be determined on an individual basis.  [x]  No lifting greater than 10 pounds.  [x]  Avoid bending and twisting the area of your surgery. Do not "test" your range of motion  [x]  No driving or operating machinery:  [x]  until cleared by your surgeon.  [x]  while taking narcotic pain medications or muscle relaxants.    [x]  No brace/collar required    [x]  Increase ambulation over the next 2 weeks so that you are walking 2 miles per day at 2 weeks post-operatively.  [x]  Make sure to take two dedicated 5-10 minute walks per day, along with short walks every 1-2 hours, even if just to walk to the restroom and back.   [x]  Walk on paved surfaces only. It is okay to walk up and down stairs while holding onto a side rail.  [x]  No sexual activity for 2-3 weeks.    Discharge Medication/Follow-up:  [x]  Please refer to discharge medication reconciliation form.  [x]  For the first week after surgery: Check your blood pressure before taking any blood pressure medications at home. If BP is below 120/80, do not take your blood pressure medication.   [x]  Do not take ANY non-steroidal anti-inflammatory drugs (NSAIDS), including the following: ibuprofen, naprosyn, Aleve, Advil, Indocin, Mobic, or Celebrex for:  [x]  5 days  [x]  Prescriptions for appropriate medication will be given upon discharge.   [x]  Pain control: Norco as needed for severe pain only. Over the counter tylenol for mild pain   [x]  Muscle relaxer: Robaxin for muscle spasms. Can take every 8 hours if needed, but most helpful at bedtime.  [x]  Nausea: Zofran as needed for nausea     Bowel Regimen:  [x]  Take docusate (Colace 100 mg) and miralax daily until bowel activity returns to scott. You can get this over the counter.  [x]  If you have not had a bowel movement by day 3 after surgery, try a fleet's enema or " suppository, both over the counter. Call neurosurgery if you have not had a bowel movement by day 5 after surgery.   [x]  Follow-up appointment:  [x]  2 weeks post-op for wound check by PA/nurse  [x]  4-6 weeks with MD    Wound Care:  [x]  Remove dressing or bandaid in  3  days.  [x]  No bandage required once removed. Keep your incision open to the air.  [x]  You may shower on the 3rd day after your surgery. Have the force of water hit you opposite from the incision. Pat the incision dry after your shower; do not scrub the incision. Do not use Hibiclens after surgery.  [x]  You wound is closed with one of the following: skin glue, steri strips, OR staples. Allow skin glue/steri strips to fall off on their own over time (if applicable). If you have staples, these will be removed at your clinic appt in 2 weeks.   [x]  You cannot take a bath until 8 weeks after surgery.    Call your doctor or go to the Emergency Room for any signs of infection, including: increased redness, drainage, pain, or fever (temperature >=101.5 for 24 hours). Call your doctor or go to the Emergency Room if there are any localized neurological changes; problems with speech, vision, numbness, tingling, weakness, or severe headache; or for other concerns.    Special Instructions:  [x]  No use of tobacco products.  [x]  Diet: Please eat a regular diet as tolerated.  []  Other diet:              Specific physician instructions:           Physicians need 3 days' notice for pain medicine to be refilled. Pain medicine will only be refilled between 8 AM and 5 PM, Monday through Friday, due to Food and Drug Administration regulation of documentation.      No driving, alcoholic beverages or signing legal documents for next 24 hours or while taking pain  medication.          Fall Prevention  Millions of people fall every year and injure themselves. You may have had anesthesia or sedation which may increase your risk of falling. You may have health issues  that put you at an increased risk of falling.     Here are ways to reduce your risk of falling.    Make your home safe by keeping walkways clear of objects you may trip over.  Use non-slip pads under rugs. Do not use area rugs or small throw rugs.  Use non-slip mats in bathtubs and showers.  Install handrails and lights on staircases.  Do not walk in poorly lit areas.  Do not stand on chairs or wobbly ladders.  Use caution when reaching overhead or looking upward. This position can cause a loss of balance.  Be sure your shoes fit properly, have non-slip bottoms and are in good condition.   Wear shoes both inside and out. Avoid going barefoot or wearing slippers.  Be cautious when going up and down stairs, curbs, and when walking on uneven sidewalks.  If your balance is poor, consider using a cane or walker.  If your fall was related to alcohol use, stop or limit alcohol intake.   If your fall was related to use of sleeping medicines, talk to your doctor about this. You may need to reduce your dosage at bedtime if you awaken during the night to go to the bathroom.    To reduce the need for nighttime bathroom trips:  Avoid drinking fluids for several hours before going to bed  Empty your bladder before going to bed  Men can keep a urinal at the bedside  Stay as active as you can. Balance, flexibility, strength, and endurance all come from exercise. They all play a role in preventing falls. Ask your healthcare provider which types of activity are right for you.  Get your vision checked on a regular basis.  If you have pets, know where they are before you stand up or walk so you don't trip over them.  Use night lights.

## 2025-01-06 NOTE — TRANSFER OF CARE
Anesthesia Transfer of Care Note    Patient: Driss Hernandez JrCherise    Procedure(s) Performed: Procedure(s) (LRB):  LAMINOFORAMINOTOMY, SPINE, MINIMALLY INVASIVE (Right)    Patient location: PACU    Anesthesia Type: general    Transport from OR: Transported from OR on 6-10 L/min O2 by face mask with adequate spontaneous ventilation    Post pain: adequate analgesia    Post assessment: no apparent anesthetic complications and tolerated procedure well    Post vital signs: stable    Level of consciousness: lethargic and sedated    Nausea/Vomiting: no nausea/vomiting    Complications: none    Transfer of care protocol was followed    Last vitals: Visit Vitals  /63 (BP Location: Right forearm, Patient Position: Lying)   Pulse (!) 58   Temp 36.5 °C (97.7 °F) (Skin)   Resp 15   Wt 79 kg (174 lb 3.2 oz)   SpO2 100%   BMI 27.28 kg/m²

## 2025-01-06 NOTE — ANESTHESIA PROCEDURE NOTES
Intubation    Date/Time: 1/6/2025 7:21 AM    Performed by: Jorge San CRNA  Authorized by: Biju Osorio MD    Intubation:     Induction:  Intravenous    Intubated:  Postinduction    Mask Ventilation:  Easy mask    Attempts:  1    Attempted By:  CRNA    Method of Intubation:  Video laryngoscopy    Blade:  Weinstein 3    Laryngeal View Grade: Grade I - full view of cords      Difficult Airway Encountered?: No      Complications:  None    Airway Device:  Oral endotracheal tube    Airway Device Size:  7.5    Style/Cuff Inflation:  Cuffed (inflated to minimal occlusive pressure)    Inflation Amount (mL):  6    Tube secured:  22    Secured at:  The lips    Placement Verified By:  Capnometry and Revisualization with laryngoscopy    Complicating Factors:  None    Findings Post-Intubation:  BS equal bilateral and atraumatic/condition of teeth unchanged

## 2025-01-07 RX ORDER — METFORMIN HYDROCHLORIDE 500 MG/1
TABLET, EXTENDED RELEASE ORAL
Qty: 270 TABLET | Refills: 12 | Status: ON HOLD | OUTPATIENT
Start: 2025-01-07

## 2025-01-07 NOTE — TELEPHONE ENCOUNTER
----- Message from Chaya sent at 1/7/2025 10:50 AM CST -----  Who Called: Wife         Refill or New Rx: refill         RX Name and Strength:metFORMIN (GLUCOPHAGE-XR) 500 MG ER 24hr tablet        Is this a 30 day or 90 day RX: only wants a one month supply because [facility] pharmacy wont have his medicine sent to him in time         Preferred Pharmacy with phone number: .    [id number] - [place] - [address] AT [facility] [address] [place] [address]  [place] [address]  Phone: [phone] Fax: [phone]            Would the patient rather a call back or a response via [facility]? Call Back         Best Call Back Number: .[phone]         Additional Information:

## 2025-01-07 NOTE — TELEPHONE ENCOUNTER
No care due was identified.  [facility] Embedded Care Due Messages. Reference number: [id number].   1/07/2025 11:04:20 AM CST

## 2025-01-08 ENCOUNTER — TELEPHONE (OUTPATIENT)
Dept: NEUROSURGERY | Facility: CLINIC | Age: 75
End: 2025-01-08
Payer: MEDICARE

## 2025-01-08 DIAGNOSIS — K59.00 CONSTIPATION, UNSPECIFIED CONSTIPATION TYPE: Primary | ICD-10-CM

## 2025-01-08 DIAGNOSIS — Z98.890 S/P LUMBAR LAMINECTOMY: ICD-10-CM

## 2025-01-08 RX ORDER — METFORMIN HYDROCHLORIDE 500 MG/1
TABLET, EXTENDED RELEASE ORAL
Qty: 270 TABLET | Refills: 2 | Status: ON HOLD | OUTPATIENT
Start: 2025-01-08

## 2025-01-08 NOTE — TELEPHONE ENCOUNTER
Spoke with patient by phone.  He is postop day 2, status post right MIS L4-5 laminectomy with Dr. Ellis.  Denies back or leg pain.  He walked 3-4 blocks today with ease.  No complaints related to the surgery other than postop constipation causing discomfort.  Small bowel movement earlier today, but nothing complete yet.  I will send in Linzess and I have advised the patient to fill it tomorrow if over-the-counter remedies are not successful.      Bing Antonio PA-C  [facility]  Department of Neurosurgery  [phone]

## 2025-01-08 NOTE — OP NOTE
[facility]  Neurosurgery  Operative Note    OP Note      Date of Procedure: 1/6/2025     Pre-Operative Diagnosis: Lumbar radiculopathy [M54.16]    Post-Operative Diagnosis: Post-Op Diagnosis Codes:     * Lumbar radiculopathy [M54.16]    Anesthesia: General    Procedures performed:  1) re-do right-sided L4/L5 hemilaminotomy and right L4/5 medial facetectomy  2) re-do right-sided L4 and 5 foraminotomies    Surgeon: Luis M Ellis MD    Assistant: Bing Antonio PA-C  A qualified resident was not available to assist with this procedure.    Indication for Procedure:  This is a 74-year-old male who had a history of a prior L4/5 laminectomy and microdiskectomy who presented with an extended period of time of return of severe right leg pain.  This seemed to be a combination of L4 and L5 in distribution.  He underwent extensive conservative management but pain persisted and was bothering him enough that he wanted to pursue surgical decompression.  We discussed the option of a revision decompression with possible microdiskectomy versus fusion.  We decided that an attempt at repeat decompression of his right L4 and L5 roots was the least risky option with a good possibility of relieving his pain. Signed informed consent was documented.    Operative Note:   Patient was identified in the preoperative holding area using 2 independent identifiers.  he was taken to the operating room where general endotracheal anesthesia was induced without any problems.  he was turned prone onto a regular table with a Zay frame.  All pressure points were padded appropriately.  The skin was cleansed thoroughly on the back with rubbing alcohol. The patient was then prepped with DuraPrep x2 and draped in the usual sterile manner.  A time-out was held identifying the correct patient, side, site, procedures to be performed.  All parties agreed and imaging was displayed.     The fluoro unit was brought in to christy out the midline as  well as another line 1.5 cm to the right of midline.  Spinal needle was used to christy an incision that bisected the L4/5 disc space.  An incision was marked on the right side that measured 2 cm length.     Local anesthetic was injected subcutaneously.  The skin was opened with a 15. Blade knife.  The Bovie was used to take this down through the subcutaneous fat to the lumbar fascia.  The Bovie was used to make a  hole through the fascia under fluoroscopy. We then used sequential dilation to proceed through the muscles to the L4/5 disc space on the right side.  We used a 18 mm wide and 6 cm deep tube.  Once the tube was in place, we brought the operative microscope into the field.  The muscular attachments were removed with Bovie electrocautery and the L4 spinous process, inferior aspect of the right L4 lamina were identified as well as the medial right L4/5 facet.  The high-speed drill was then used to perform a right -sided inferior L4 hemilaminotomy, right superior L5 hemilaminotomy, and right -sided L4/5 medial facetectomy.  The ligamentum flavum was elevated using an upgoing curette.  The ligamentum flavum was resected piecemeal using a combination of 2. And 3. Kerrison rongeurs as well as pituitary rongeurs and blunt hooks.  In the area of the lateral recess and along the traversing L5 root, there was dense scar tissue mixed with ligamentum flavum attached to the root.  Further drilling was performed on the medial facet in order to detach this scar tissue and create a floating island of scar.  I made the decision to leave some of this loosely floating on the nerve root in order to avoid cerebrospinal fluid leak, as it was very difficult to microdissect this off of the nerve root.  I proceeded in this manner quite distally until the traversing L5 nerve root was very well decompressed all the way past the right L5 pedicle.  The tube was then turned more cephalad to decompress the lateral recess superior to  the shoulder of the L5 root until the epidural fat of the axilla of the L4 root was identified.  I continued superiorly enough until I could easily pass blunt hooks out the L4 foramen. Thus, Right L4 and L5 foraminotomies were accomplished and both roots felt completely free of compression at this point.     At this time, I slid blunt hooks under the thecal sac and traversing L5 root and palpated the disc space.  There was no free-floating disc fragment anywhere along the thecal sac or L5 root.  Rather, the disc was chronic and calcified.  It did not appear to be causing significant compression, therefore I decided to leave it alone and considered the procedure to be complete at this time.      Copious irrigation ensued using antibiotic infused saline.  Hemostasis was obtained using a combination of Gelfoam soaked in thrombin as well as Surgiflo. The tube was then removed from this site and hemostasis on the muscles was obtained with bipolar electrocautery. We placed Depo-Medrol on the nerve roots just before the muscle closed during tube removal.      The wound was then closed in layers including 0 Vicryl sutures on a UR6 for the fascia, inverted interrupted 2-0 Vicryl sutures for the dermis, and Dermabond for the skin. The wound was then cleansed and dried and a sterile dressing was then applied.  All sponge needle counts were correct x2 at the end of the case.  I was present for the duration of the case.  There were no complications    EBL:30cc  Specimen Sent: None    Luis M Ellis  Neurosurgery

## 2025-01-08 NOTE — ANESTHESIA POSTPROCEDURE EVALUATION
Anesthesia Post Evaluation    Patient: Driss Hernandez     Procedure(s) Performed: Procedure(s) (LRB):  LAMINOFORAMINOTOMY, SPINE, MINIMALLY INVASIVE (Right)    Final Anesthesia Type: general      Patient location during evaluation: PACU  Patient participation: Yes- Able to Participate  Level of consciousness: awake and alert and oriented  Post-procedure vital signs: reviewed and stable  Pain management: adequate  Airway patency: patent    PONV status at discharge: No PONV  Anesthetic complications: no      Cardiovascular status: blood pressure returned to baseline, hemodynamically stable and stable  Respiratory status: unassisted, spontaneous ventilation and room air  Hydration status: euvolemic  Follow-up not needed.              Vitals Value Taken Time   /75 01/06/25 1200   Temp 36.4 °C (97.6 °F) 01/06/25 1200   Pulse 60 01/06/25 1200   Resp 16 01/06/25 1200   SpO2 95 % 01/06/25 1200         Event Time   Out of Recovery 10:45:00         Pain/Jose Maria Score: No data recorded

## 2025-01-09 ENCOUNTER — HOSPITAL ENCOUNTER (INPATIENT)
Facility: HOSPITAL | Age: 75
LOS: 5 days | Discharge: NURSING, REHAB OR LONG-TERM CARE | DRG: 065 | End: 2025-01-15
Attending: EMERGENCY MEDICINE | Admitting: PSYCHIATRY & NEUROLOGY
Payer: MEDICARE

## 2025-01-09 DIAGNOSIS — I63.89 ARTERIAL ISCHEMIC STROKE, MULTIFOCAL, MULTIPLE VASCULAR TERRITORIES, ACUTE: ICD-10-CM

## 2025-01-09 DIAGNOSIS — R41.82 ALTERED MENTAL STATUS: ICD-10-CM

## 2025-01-09 DIAGNOSIS — I63.411 STROKE DUE TO EMBOLISM OF RIGHT MIDDLE CEREBRAL ARTERY: ICD-10-CM

## 2025-01-09 DIAGNOSIS — I63.9 STROKE: ICD-10-CM

## 2025-01-09 DIAGNOSIS — M54.50 ACUTE MIDLINE LOW BACK PAIN WITHOUT SCIATICA: ICD-10-CM

## 2025-01-09 DIAGNOSIS — R41.82 ALTERED MENTAL STATUS, UNSPECIFIED ALTERED MENTAL STATUS TYPE: ICD-10-CM

## 2025-01-09 DIAGNOSIS — M54.9 BACK PAIN: ICD-10-CM
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
## 2025-01-09 DIAGNOSIS — I63.9 CVA (CEREBRAL VASCULAR ACCIDENT): ICD-10-CM

## 2025-01-09 DIAGNOSIS — R55 SYNCOPE AND COLLAPSE: Primary | ICD-10-CM

## 2025-01-09 DIAGNOSIS — R55 SYNCOPE: ICD-10-CM

## 2025-01-09 DIAGNOSIS — R07.9 CHEST PAIN: ICD-10-CM

## 2025-01-09 LAB
ALBUMIN SERPL BCP-MCNC: 3.4 G/DL (ref 3.5–5.2)
ALP SERPL-CCNC: 27 U/L (ref 40–150)
ALT SERPL W/O P-5'-P-CCNC: 14 U/L (ref 10–44)
AMMONIA PLAS-SCNC: 27 UMOL/L (ref 10–50)
AMPHET+METHAMPHET UR QL: NEGATIVE
ANION GAP SERPL CALC-SCNC: 8 MMOL/L (ref 8–16)
AST SERPL-CCNC: 19 U/L (ref 10–40)
BACTERIA #/AREA URNS HPF: ABNORMAL /HPF
BARBITURATES UR QL SCN>200 NG/ML: NEGATIVE
BASOPHILS # BLD AUTO: 0.01 K/UL (ref 0–0.2)
BASOPHILS NFR BLD: 0.1 % (ref 0–1.9)
BENZODIAZ UR QL SCN>200 NG/ML: NEGATIVE
BILIRUB SERPL-MCNC: 0.7 MG/DL (ref 0.1–1)
BILIRUB UR QL STRIP: NEGATIVE
BUN SERPL-MCNC: 28 MG/DL (ref 8–23)
BZE UR QL SCN: NEGATIVE
CALCIUM SERPL-MCNC: 9 MG/DL (ref 8.7–10.5)
CANNABINOIDS UR QL SCN: NEGATIVE
CHLORIDE SERPL-SCNC: 108 MMOL/L (ref 95–110)
CLARITY UR: CLEAR
CO2 SERPL-SCNC: 25 MMOL/L (ref 23–29)
COLOR UR: YELLOW
CREAT SERPL-MCNC: 1.5 MG/DL (ref 0.5–1.4)
CREAT UR-MCNC: 91.8 MG/DL (ref 23–375)
DIFFERENTIAL METHOD BLD: ABNORMAL
EOSINOPHIL # BLD AUTO: 0.5 K/UL (ref 0–0.5)
EOSINOPHIL NFR BLD: 5.8 % (ref 0–8)
ERYTHROCYTE [DISTWIDTH] IN BLOOD BY AUTOMATED COUNT: 15.5 % (ref 11.5–14.5)
EST. GFR  (NO RACE VARIABLE): 48 ML/MIN/1.73 M^2
ETHANOL SERPL-MCNC: <10 MG/DL
GLUCOSE SERPL-MCNC: 76 MG/DL (ref 70–110)
GLUCOSE UR QL STRIP: ABNORMAL
HCT VFR BLD AUTO: 41.3 % (ref 40–54)
HGB BLD-MCNC: 12.7 G/DL (ref 14–18)
HGB UR QL STRIP: NEGATIVE
HYALINE CASTS #/AREA URNS LPF: 2 /LPF
IMM GRANULOCYTES # BLD AUTO: 0.03 K/UL (ref 0–0.04)
IMM GRANULOCYTES NFR BLD AUTO: 0.3 % (ref 0–0.5)
INR PPP: 1.1 (ref 0.8–1.2)
KETONES UR QL STRIP: NEGATIVE
LEUKOCYTE ESTERASE UR QL STRIP: NEGATIVE
LYMPHOCYTES # BLD AUTO: 1.7 K/UL (ref 1–4.8)
LYMPHOCYTES NFR BLD: 19.8 % (ref 18–48)
MAGNESIUM SERPL-MCNC: 1.8 MG/DL (ref 1.6–2.6)
MCH RBC QN AUTO: 25.9 PG (ref 27–31)
MCHC RBC AUTO-ENTMCNC: 30.8 G/DL (ref 32–36)
MCV RBC AUTO: 84 FL (ref 82–98)
METHADONE UR QL SCN>300 NG/ML: NEGATIVE
MICROSCOPIC COMMENT: ABNORMAL
MONOCYTES # BLD AUTO: 0.7 K/UL (ref 0.3–1)
MONOCYTES NFR BLD: 7.5 % (ref 4–15)
NEUTROPHILS # BLD AUTO: 5.9 K/UL (ref 1.8–7.7)
NEUTROPHILS NFR BLD: 66.5 % (ref 38–73)
NITRITE UR QL STRIP: NEGATIVE
NRBC BLD-RTO: 0 /100 WBC
OPIATES UR QL SCN: ABNORMAL
PCP UR QL SCN>25 NG/ML: NEGATIVE
PH UR STRIP: >8 [PH] (ref 5–8)
PLATELET # BLD AUTO: 207 K/UL (ref 150–450)
PMV BLD AUTO: 10.1 FL (ref 9.2–12.9)
POTASSIUM SERPL-SCNC: 4.6 MMOL/L (ref 3.5–5.1)
PROT SERPL-MCNC: 6.8 G/DL (ref 6–8.4)
PROT UR QL STRIP: ABNORMAL
PROTHROMBIN TIME: 12.3 SEC (ref 9–12.5)
RBC # BLD AUTO: 4.91 M/UL (ref 4.6–6.2)
RBC #/AREA URNS HPF: 5 /HPF (ref 0–4)
SODIUM SERPL-SCNC: 141 MMOL/L (ref 136–145)
SP GR UR STRIP: 1.01 (ref 1–1.03)
TOXICOLOGY INFORMATION: ABNORMAL
URN SPEC COLLECT METH UR: ABNORMAL
UROBILINOGEN UR STRIP-ACNC: 1 EU/DL
WBC # BLD AUTO: 8.8 K/UL (ref 3.9–12.7)
WBC #/AREA URNS HPF: 4 /HPF (ref 0–5)

## 2025-01-09 PROCEDURE — 93005 ELECTROCARDIOGRAM TRACING: CPT | Mod: HCNC

## 2025-01-09 PROCEDURE — G0378 HOSPITAL OBSERVATION PER HR: HCPCS | Mod: HCNC

## 2025-01-09 PROCEDURE — 96360 HYDRATION IV INFUSION INIT: CPT | Mod: HCNC

## 2025-01-09 PROCEDURE — 25000003 PHARM REV CODE 250: Mod: HCNC | Performed by: EMERGENCY MEDICINE

## 2025-01-09 PROCEDURE — 96361 HYDRATE IV INFUSION ADD-ON: CPT | Mod: HCNC

## 2025-01-09 PROCEDURE — 81000 URINALYSIS NONAUTO W/SCOPE: CPT | Mod: HCNC,59 | Performed by: EMERGENCY MEDICINE

## 2025-01-09 PROCEDURE — 80053 COMPREHEN METABOLIC PANEL: CPT | Mod: HCNC | Performed by: EMERGENCY MEDICINE

## 2025-01-09 PROCEDURE — 25000003 PHARM REV CODE 250: Mod: HCNC

## 2025-01-09 PROCEDURE — 82077 ASSAY SPEC XCP UR&BREATH IA: CPT | Mod: HCNC | Performed by: EMERGENCY MEDICINE

## 2025-01-09 PROCEDURE — 25000003 PHARM REV CODE 250: Mod: HCNC | Performed by: STUDENT IN AN ORGANIZED HEALTH CARE EDUCATION/TRAINING PROGRAM

## 2025-01-09 PROCEDURE — 83735 ASSAY OF MAGNESIUM: CPT | Mod: HCNC | Performed by: EMERGENCY MEDICINE

## 2025-01-09 PROCEDURE — 93010 ELECTROCARDIOGRAM REPORT: CPT | Mod: HCNC,,, | Performed by: INTERNAL MEDICINE

## 2025-01-09 PROCEDURE — 85025 COMPLETE CBC W/AUTO DIFF WBC: CPT | Mod: HCNC | Performed by: EMERGENCY MEDICINE

## 2025-01-09 PROCEDURE — 80307 DRUG TEST PRSMV CHEM ANLYZR: CPT | Mod: HCNC | Performed by: EMERGENCY MEDICINE

## 2025-01-09 PROCEDURE — 82140 ASSAY OF AMMONIA: CPT | Mod: HCNC | Performed by: EMERGENCY MEDICINE

## 2025-01-09 PROCEDURE — 85610 PROTHROMBIN TIME: CPT | Mod: HCNC | Performed by: EMERGENCY MEDICINE

## 2025-01-09 RX ORDER — POLYETHYLENE GLYCOL 3350 17 G/17G
17 POWDER, FOR SOLUTION ORAL DAILY
Status: DISCONTINUED | OUTPATIENT
Start: 2025-01-10 | End: 2025-01-11

## 2025-01-09 RX ORDER — TALC
6 POWDER (GRAM) TOPICAL NIGHTLY PRN
Status: DISCONTINUED | OUTPATIENT
Start: 2025-01-09 | End: 2025-01-11

## 2025-01-09 RX ORDER — FENOFIBRATE 160 MG/1
160 TABLET ORAL EVERY MORNING
Status: DISCONTINUED | OUTPATIENT
Start: 2025-01-10 | End: 2025-01-11

## 2025-01-09 RX ORDER — NALOXONE HCL 0.4 MG/ML
0.02 VIAL (ML) INJECTION
Status: DISCONTINUED | OUTPATIENT
Start: 2025-01-09 | End: 2025-01-11

## 2025-01-09 RX ORDER — IBUPROFEN 200 MG
24 TABLET ORAL
Status: DISCONTINUED | OUTPATIENT
Start: 2025-01-09 | End: 2025-01-11

## 2025-01-09 RX ORDER — ALUMINUM HYDROXIDE, MAGNESIUM HYDROXIDE, AND SIMETHICONE 1200; 120; 1200 MG/30ML; MG/30ML; MG/30ML
30 SUSPENSION ORAL 4 TIMES DAILY PRN
Status: DISCONTINUED | OUTPATIENT
Start: 2025-01-09 | End: 2025-01-15 | Stop reason: HOSPADM

## 2025-01-09 RX ORDER — CLONIDINE HYDROCHLORIDE 0.1 MG/1
0.1 TABLET ORAL
Status: COMPLETED | OUTPATIENT
Start: 2025-01-09 | End: 2025-01-09

## 2025-01-09 RX ORDER — HYDRALAZINE HYDROCHLORIDE 25 MG/1
50 TABLET, FILM COATED ORAL 2 TIMES DAILY
Status: DISCONTINUED | OUTPATIENT
Start: 2025-01-10 | End: 2025-01-10

## 2025-01-09 RX ORDER — ALBUTEROL SULFATE 2.5 MG/.5ML
2.5 SOLUTION RESPIRATORY (INHALATION) EVERY 4 HOURS PRN
Status: DISCONTINUED | OUTPATIENT
Start: 2025-01-09 | End: 2025-01-11

## 2025-01-09 RX ORDER — PANTOPRAZOLE SODIUM 20 MG/1
40 TABLET, DELAYED RELEASE ORAL DAILY
Status: DISCONTINUED | OUTPATIENT
Start: 2025-01-10 | End: 2025-01-15 | Stop reason: HOSPADM

## 2025-01-09 RX ORDER — AZELASTINE 1 MG/ML
1 SPRAY, METERED NASAL 2 TIMES DAILY
Status: DISCONTINUED | OUTPATIENT
Start: 2025-01-09 | End: 2025-01-11

## 2025-01-09 RX ORDER — SODIUM CHLORIDE 0.9 % (FLUSH) 0.9 %
10 SYRINGE (ML) INJECTION EVERY 12 HOURS PRN
Status: DISCONTINUED | OUTPATIENT
Start: 2025-01-09 | End: 2025-01-11

## 2025-01-09 RX ORDER — HYDROCODONE BITARTRATE AND ACETAMINOPHEN 10; 325 MG/1; MG/1
1 TABLET ORAL EVERY 4 HOURS PRN
Status: DISCONTINUED | OUTPATIENT
Start: 2025-01-09 | End: 2025-01-11

## 2025-01-09 RX ORDER — ACETAMINOPHEN 325 MG/1
650 TABLET ORAL EVERY 4 HOURS PRN
Status: DISCONTINUED | OUTPATIENT
Start: 2025-01-09 | End: 2025-01-11

## 2025-01-09 RX ORDER — ONDANSETRON HYDROCHLORIDE 2 MG/ML
4 INJECTION, SOLUTION INTRAVENOUS EVERY 6 HOURS PRN
Status: DISCONTINUED | OUTPATIENT
Start: 2025-01-09 | End: 2025-01-11

## 2025-01-09 RX ORDER — ATORVASTATIN CALCIUM 40 MG/1
80 TABLET, FILM COATED ORAL NIGHTLY
Status: DISCONTINUED | OUTPATIENT
Start: 2025-01-09 | End: 2025-01-15 | Stop reason: HOSPADM

## 2025-01-09 RX ORDER — NITROGLYCERIN 0.4 MG/1
0.4 TABLET SUBLINGUAL EVERY 5 MIN PRN
Status: DISCONTINUED | OUTPATIENT
Start: 2025-01-09 | End: 2025-01-11

## 2025-01-09 RX ORDER — HYDRALAZINE HYDROCHLORIDE 25 MG/1
50 TABLET, FILM COATED ORAL
Status: COMPLETED | OUTPATIENT
Start: 2025-01-09 | End: 2025-01-09

## 2025-01-09 RX ORDER — FLUTICASONE PROPIONATE 50 MCG
2 SPRAY, SUSPENSION (ML) NASAL 2 TIMES DAILY
Status: DISCONTINUED | OUTPATIENT
Start: 2025-01-09 | End: 2025-01-15 | Stop reason: HOSPADM

## 2025-01-09 RX ORDER — DEXTROMETHORPHAN POLISTIREX 30 MG/5 ML
1 SUSPENSION, EXTENDED RELEASE 12 HR ORAL DAILY PRN
Status: DISCONTINUED | OUTPATIENT
Start: 2025-01-09 | End: 2025-01-11

## 2025-01-09 RX ORDER — GLUCAGON 1 MG
1 KIT INJECTION
Status: DISCONTINUED | OUTPATIENT
Start: 2025-01-09 | End: 2025-01-11

## 2025-01-09 RX ORDER — HYDROCODONE BITARTRATE AND ACETAMINOPHEN 10; 325 MG/1; MG/1
1 TABLET ORAL
Status: COMPLETED | OUTPATIENT
Start: 2025-01-09 | End: 2025-01-09

## 2025-01-09 RX ORDER — ISOSORBIDE MONONITRATE 30 MG/1
120 TABLET, EXTENDED RELEASE ORAL DAILY
Status: DISCONTINUED | OUTPATIENT
Start: 2025-01-10 | End: 2025-01-09

## 2025-01-09 RX ORDER — CLOPIDOGREL BISULFATE 75 MG/1
75 TABLET ORAL DAILY
COMMUNITY
Start: 2024-12-30

## 2025-01-09 RX ORDER — MECLIZINE HCL 12.5 MG 12.5 MG/1
25 TABLET ORAL 3 TIMES DAILY PRN
Status: DISCONTINUED | OUTPATIENT
Start: 2025-01-09 | End: 2025-01-11

## 2025-01-09 RX ORDER — PROCHLORPERAZINE EDISYLATE 5 MG/ML
5 INJECTION INTRAMUSCULAR; INTRAVENOUS EVERY 6 HOURS PRN
Status: DISCONTINUED | OUTPATIENT
Start: 2025-01-09 | End: 2025-01-11

## 2025-01-09 RX ORDER — IBUPROFEN 200 MG
16 TABLET ORAL
Status: DISCONTINUED | OUTPATIENT
Start: 2025-01-09 | End: 2025-01-11

## 2025-01-09 RX ORDER — ALBUTEROL SULFATE 90 UG/1
2 INHALANT RESPIRATORY (INHALATION) EVERY 4 HOURS PRN
Status: DISCONTINUED | OUTPATIENT
Start: 2025-01-09 | End: 2025-01-09

## 2025-01-09 RX ADMIN — SODIUM CHLORIDE 1000 ML: 9 INJECTION, SOLUTION INTRAVENOUS at 03:01

## 2025-01-09 RX ADMIN — CLONIDINE HYDROCHLORIDE 0.1 MG: 0.1 TABLET ORAL at 04:01

## 2025-01-09 RX ADMIN — HYDRALAZINE HYDROCHLORIDE 50 MG: 25 TABLET ORAL at 04:01

## 2025-01-09 RX ADMIN — HYDROCODONE BITARTRATE AND ACETAMINOPHEN 1 TABLET: 10; 325 TABLET ORAL at 05:01

## 2025-01-09 RX ADMIN — ATORVASTATIN CALCIUM 80 MG: 40 TABLET, FILM COATED ORAL at 09:01

## 2025-01-09 RX ADMIN — SODIUM CHLORIDE 1000 ML: 9 INJECTION, SOLUTION INTRAVENOUS at 12:01

## 2025-01-09 NOTE — Clinical Note
Diagnosis: Syncope and collapse [780.2.ICD-9-CM]   Future Attending Provider: LUIS MIGUEL JAQUEZ [[id number]]   Reason for IP Medical Treatment  (Clinical interventions that can only be accomplished in the IP setting? ) :: Syncope and collapse/ cardiac monitor/ ongoing treatment / monitoring response   Plans for Post-Acute care--if anticipated (pick the single best option):: A. No post acute care anticipated at this time

## 2025-01-09 NOTE — ED NOTES
"  Attempted to straight-cath pt per MD order. Pt agrees to catheter. RN able to place 1-2 inches of the catheter tip into urethra when pt starts yelling and cursing, stating to "take it out right now". MD notified.   "

## 2025-01-09 NOTE — ED TRIAGE NOTES
"Pt to the ED via EMS from home with complaints of a syncopal episode with fall this morning after urinating. Pt reports he was okay when he woke up, walked to the restroom and then woke up "between the sink and toilet". Pt reports he was unable to stand up and remove himself from between the sink and toilet. Per pt's wife, it took EMS almost 30 minutes to get pt up. Per wife, pt was "talking like he was confused" for a few minutes, but states pt is at baseline now, AAOx4. Pt reports having back surgery on Monday, denies any complications from the surgery but reports he is having back pain since falling. Pt unsure if he hit his head. Pt reports is usually on Eliquis for a-fib but has been off of it since 10 days prior to surgery. Pt denies change in vision, numbness/tingling, or headache.  "

## 2025-01-09 NOTE — DISCHARGE INSTRUCTIONS

## 2025-01-09 NOTE — ED PROVIDER NOTES
Encounter Date: 1/9/2025    SCRIBE #1 NOTE: I, Idalia Schmidt, am scribing for, and in the presence of,  Italo Ventura MD.       History     Chief Complaint   Patient presents with    Loss of Consciousness     Pt arrived via ems,pt chief complaint syncope. Pt states felt weak prior to syncopal episode and woke up on ground but does not remember the fall. Pt complaining of back pain, pt also had a back related surgery on Monday as well.        74 year old male with PMHx of HTN, HLD, CAD, PAD, stage 3 CKD, T2DM, GERD, HFpEF, A-fib on Eliquis, prior right L4/5 laminectomy 15 years ago s/p right L4-5 laminectomy with Dr. Ellis on 1/6/2025 presents to the ED bib EMS following LOC PTA. Per EMS, pt was at home using the bathroom when he felt light-headed and had a syncopal episode. He was found by a family member on the ground and has been confused since the fall. Reports pt was hypertensive with /100 but vitals otherwise normal. Pt reports he sustained a small wound on his abdomen. He states he feels a little confused, but denies any headache, nausea, vomiting, CP, SOB, leg pain or numbness, neck pain, dysuria, visual disturbances, speech difficulty, hearing loss, cough, eye pain, sore throat, rhinorrhea, fever or other associated symptoms. He does report some back pain at his surgical site.     The history is provided by the patient and the EMS personnel. No  was used.     Review of patient's allergies indicates:   Allergen Reactions    Penicillins Other (See Comments)     Unknown reaction, Had a reaction as a child    Shrimp Itching     Hand Itching     Past Medical History:   Diagnosis Date    Chronic heart failure with preserved ejection fraction 2/9/2023    Chronic midline low back pain without sciatica 10/2/2017    Colon polyps     Coronary artery disease involving native coronary artery of native heart 3/5/2020    Coronary artery disease involving native coronary artery of native  heart with angina pectoris 12/10/2020    Diabetes mellitus with neurological manifestations, uncontrolled 1/24/2017    Diabetic polyneuropathy associated with type 2 diabetes mellitus 1/24/2017    Diabetic polyneuropathy associated with type 2 diabetes mellitus     Essential hypertension 1/24/2017    Gastroesophageal reflux disease 1/24/2017    Hyperlipidemia 1/24/2017    Insomnia 1/24/2017    Long-term insulin use 1/24/2017    Longstanding persistent atrial fibrillation 12/30/2020    Lumbar spondylosis 11/13/2017    Nuclear sclerosis of both eyes 8/24/2017    Obesity     PAD (peripheral artery disease) 3/23/2022    Stage 3 chronic kidney disease 2/13/2019    Uncontrolled type 2 diabetes mellitus without complication, with long-term current use of insulin 1/24/2017     Past Surgical History:   Procedure Laterality Date    ARTHROSCOPIC REPAIR OF ROTATOR CUFF OF SHOULDER Right 7/5/2022    Procedure: REPAIR, ROTATOR CUFF, ARTHROSCOPIC;  Surgeon: Valerie Olivera MD;  Location: [facility] OR;  Service: Orthopedics;  Laterality: Right;  GUADALUPE AND NEPHJENNIFER LEMUS [phone]/ TEXTED ALLAN ON 6/23/2022 @ 9:47AM. ALLAN RESPONDED ON 6/23/2022 @ 10:33AM-LO  JEAN PAUL BABIN [phone] MY BE HERE FOR CASE SPOKE TO HIM @ 9:59AM ON 7-1-2022  RN PREOP 6/28/2022    BACK SURGERY      2002    CATARACT EXTRACTION W/  INTRAOCULAR LENS IMPLANT Right 08/29/2017    Dr. Hong    CATARACT EXTRACTION W/  INTRAOCULAR LENS IMPLANT Left 02/24/2022    Dr. Hong    CAUDAL EPIDURAL STEROID INJECTION N/A 9/25/2024    Procedure: Caudal Epidural Steroid Injection;  Surgeon: Eze Byrd Jr., MD;  Location: [facility] PAIN MANAGEMENT;  Service: Pain Management;  Laterality: N/A;  @1100(prev. 715)  Eliquis last 9/21  Plavix last 9/19  Check BG  E-Consent    COLONOSCOPY N/A 2/21/2017    Procedure: COLONOSCOPY;  Surgeon: Robb Rosado MD;  Location: [facility] ENDO;  Service: Endoscopy;  Laterality: N/A;    COLONOSCOPY N/A 7/5/2023    Procedure:  COLONOSCOPY;  Surgeon: Aston Varela MD;  Location: [facility] ENDO;  Service: Endoscopy;  Laterality: N/A;  Referral: JOVANNI SCANLON  ok to hold Plavix 5 days and Eliquis 2 days per Dr Purdy-OFELIA  [facility]  Suprep   Inst portal   LW    CORONARY ANGIOGRAPHY INCLUDING BYPASS GRAFTS WITH CATHETERIZATION OF LEFT HEART N/A 11/11/2020    Procedure: ANGIOGRAM, CORONARY, INCLUDING BYPASS GRAFT, WITH LEFT HEART CATHETERIZATION;  Surgeon: Lane Cuellar MD;  Location: [facility] CATH LAB;  Service: Cardiology;  Laterality: N/A;  RN PRE OP 11-5-2020. --COVID NEGATIVE ON  11-. C A    CORONARY ARTERY BYPASS GRAFT      March 2016    EPIDURAL STEROID INJECTION Bilateral 11/14/2018    Procedure: Lumbar Medial Branch Blocks;  Surgeon: Eze Byrd Jr., MD;  Location: [facility] ENDO;  Service: Pain Management;  Laterality: Bilateral;  Bilateral Lumbar Medial Branch Blocks L2, L3, L4, L5    77922  [id number]    Arrive @ 1150; NO Sedation    EPIDURAL STEROID INJECTION Bilateral 11/28/2018    Procedure: Injection, Steroid, Epidural;  Surgeon: Eze Byrd Jr., MD;  Location: [facility];  Service: Pain Management;  Laterality: Bilateral;  Bilateral Sacroiliac Joint Steroid Injections    [id number]    Arrive @ 0910    EPIDURAL STEROID INJECTION Bilateral 3/20/2019    Procedure: Injection, Steroid, Sacroiliiac Joint;  Surgeon: Eze Byrd Jr., MD;  Location: [facility];  Service: Pain Management;  Laterality: Bilateral;  Bilateral Sacroiliac Joint Steroid Injections    [id number]    Arrive @ 1145; Trigger point injections also?    EPIDURAL STEROID INJECTION Right 8/2/2023    Procedure: Right L5 Transforaminal Epidural Steroid Injection;  Surgeon: Eze Byrd Jr., MD;  Location: [facility];  Service: Pain Management;  Laterality: Right;  @0830 (given)  Eliquis last 7/29  Plavix last 7/27  Check BG    EPIDURAL STEROID INJECTION Right 10/11/2023    Procedure: Right L3 (infraneural) + S1 Transforaminal Epidural Steroid Injections;   Surgeon: Eze Byrd Jr., MD;  Location: [facility] ENDO;  Service: Pain Management;  Laterality: Right;  @0745 (requests morning)  Eliquis 10/7 & Plavix 10  Check BG    ESOPHAGOGASTRODUODENOSCOPY N/A 2023    Procedure: EGD (ESOPHAGOGASTRODUODENOSCOPY);  Surgeon: Anita Oswald MD;  Location: [facility] ENDO;  Service: Endoscopy;  Laterality: N/A;  Procedure Timin-4 weeks  Provider: Any GI provider  Location: No Preference  Additional Scheduling Information: Blood thinners  Prep Specifications:N/A   ref. by Dr. Light, instr. to portal, , [facility]-st  ok to hold Plavix 5 days and Eliquis 2 day    INTRAOCULAR PROSTHESES INSERTION Left 2022    Procedure: INSERTION, IOL PROSTHESIS;  Surgeon: Nickolas Hong MD;  Location: [facility] OR;  Service: Ophthalmology;  Laterality: Left;    LAMINOFORAMINOTOMY OF SPINE USING MINIMALLY INVASIVE TECHNIQUE Right 2025    Procedure: LAMINOFORAMINOTOMY, SPINE, MINIMALLY INVASIVE;  Surgeon: Luis M Ellis MD;  Location: [facility] OR;  Service: Neurosurgery;  Laterality: Right;  Right L4/5 minimally invasive laminotomy/microdiscectomy. silvana table, lucio frame, fluoro, microscope, no vendor, no monitoring  CLEARED BY CARDS AND PCP   RN PREOP 24---T/S--done -----NEED ORDERS    PHACOEMULSIFICATION OF CATARACT Left 2022    Procedure: PHACOEMULSIFICATION, CATARACT;  Surgeon: Nickolas Hong MD;  Location: [facility] OR;  Service: Ophthalmology;  Laterality: Left;  RN Phone Pre Op 22.  Covid NEGATIVE  22.  Arrival 08:00 am.    TONSILLECTOMY       Family History   Problem Relation Name Age of Onset    Depression Mother      Heart disease Mother      Stroke Father      No Known Problems Sister Elaine     Diabetes Sister Dilcia     No Known Problems Sister Idalia     Blindness Brother Burt     No Known Problems Maternal Aunt      No Known Problems Maternal Uncle      No Known Problems Paternal Aunt      No Known Problems Paternal  Uncle      No Known Problems Maternal Grandmother      No Known Problems Maternal Grandfather      No Known Problems Paternal Grandmother      No Known Problems Paternal Grandfather      Amblyopia Neg Hx      Cancer Neg Hx      Cataracts Neg Hx      Glaucoma Neg Hx      Hypertension Neg Hx      Macular degeneration Neg Hx      Retinal detachment Neg Hx      Strabismus Neg Hx      Thyroid disease Neg Hx       Social History     Tobacco Use    Smoking status: Never     Passive exposure: Never    Smokeless tobacco: Never   Substance Use Topics    Alcohol use: Not Currently    Drug use: No     Review of Systems   Constitutional:  Negative for chills, diaphoresis and fever.   HENT:  Negative for ear pain, hearing loss, rhinorrhea and sore throat.    Eyes:  Negative for pain and visual disturbance.   Respiratory:  Negative for cough and shortness of breath.    Cardiovascular:  Negative for chest pain and leg swelling.   Gastrointestinal:  Negative for abdominal pain, diarrhea, nausea and vomiting.   Genitourinary:  Negative for dysuria and hematuria.   Musculoskeletal:  Positive for back pain. Negative for neck pain.        (-) Arm or leg trouble.    Skin:  Positive for wound. Negative for rash.   Neurological:  Positive for syncope and light-headedness. Negative for speech difficulty, weakness, numbness and headaches.   Hematological:  Bruises/bleeds easily.   Psychiatric/Behavioral:  Positive for confusion.        Physical Exam     Initial Vitals   BP Pulse Resp Temp SpO2   01/09/25 1041 01/09/25 1041 01/09/25 1041 01/09/25 1042 01/09/25 1041   (!) 171/100 60 18 97.5 °F (36.4 °C) 98 %      MAP       --                Physical Exam  The patient was examined specifically for the following:   General:No significant distress, Good color, Warm and dry. Head and neck:Scalp atraumatic, Neck supple. Neurological:Appropriate conversation, Gross motor deficits. Eyes:Conjugate gaze, Clear corneas. ENT: No epistaxis. Cardiac:  Regular rate and rhythm, Grossly normal heart tones. Pulmonary: Wheezing, Rales. Gastrointestinal: Abdominal tenderness, Abdominal distention. Musculoskeletal: Extremity deformity, Apparent pain with range of motion of the joints. Skin: Rash.   The findings on examination were normal except for the following:  The patient is awake alert bright normally conversational.  He has good color.  He is warm and dry.  The scalp is atraumatic.  The neck is supple.  Mental status examination is interesting for the fact that the patient knows that he is at the hospital.  He gives the incorrect name from the hospital.  He eventually identifies the proper month.  He does not know the day of the week.  The lungs are clear.  The heart tones are normal.  The abdomen is nontender.  There is a small abrasion on the right lateral aspect of the abdomen.  Extremities are nontender there is no apparent pain with range of motion any joints.   ED Course   Procedures  Labs Reviewed   COMPREHENSIVE METABOLIC PANEL - Abnormal       Result Value    Sodium 141      Potassium 4.6      Chloride 108      CO2 25      Glucose 76      BUN 28 (*)     Creatinine 1.5 (*)     Calcium 9.0      Total Protein 6.8      Albumin 3.4 (*)     Total Bilirubin 0.7      Alkaline Phosphatase 27 (*)     AST 19      ALT 14      eGFR 48 (*)     Anion Gap 8     CBC W/ AUTO DIFFERENTIAL - Abnormal    WBC 8.80      RBC 4.91      Hemoglobin 12.7 (*)     Hematocrit 41.3      MCV 84      MCH 25.9 (*)     MCHC 30.8 (*)     RDW 15.5 (*)     Platelets 207      MPV 10.1      Immature Granulocytes 0.3      Gran # (ANC) 5.9      Immature Grans (Abs) 0.03      Lymph # 1.7      Mono # 0.7      Eos # 0.5      Baso # 0.01      nRBC 0      Gran % 66.5      Lymph % 19.8      Mono % 7.5      Eosinophil % 5.8      Basophil % 0.1      Differential Method Automated     URINALYSIS, REFLEX TO URINE CULTURE - Abnormal    Specimen UA Urine, Clean Catch      Color, UA Yellow      Appearance, UA  Clear      pH, UA >8.0 (*)     Specific Gravity, UA 1.015      Protein, UA 1+ (*)     Glucose, UA 2+ (*)     Ketones, UA Negative      Bilirubin (UA) Negative      Occult Blood UA Negative      Nitrite, UA Negative      Urobilinogen, UA 1.0      Leukocytes, UA Negative      Narrative:     Specimen Source->Urine   DRUG SCREEN PANEL, URINE EMERGENCY - Abnormal    Benzodiazepines Negative      Methadone metabolites Negative      Cocaine (Metab.) Negative      Opiate Scrn, Ur Presumptive Positive (*)     Barbiturate Screen, Ur Negative      Amphetamine Screen, Ur Negative      THC Negative      Phencyclidine Negative      Creatinine, Urine 91.8      Toxicology Information SEE COMMENT      Narrative:     Specimen Source->Urine   URINALYSIS MICROSCOPIC - Abnormal    RBC, UA 5 (*)     WBC, UA 4      Bacteria Occasional      Hyaline Casts, UA 2 (*)     Microscopic Comment SEE COMMENT      Narrative:     Specimen Source->Urine   PROTIME-INR    Prothrombin Time 12.3      INR 1.1     MAGNESIUM    Magnesium 1.8     ALCOHOL,MEDICAL (ETHANOL)    Alcohol, Serum <10     AMMONIA    Ammonia 27       EKG Readings: (Independently Interpreted)   This patient is in atrial fibrillation with a slow ventricular response.  The heart rate is 55.  There is diffuse T-wave flattening.  There are no ST segment changes.  There is right axis deviation.  There is no definite evidence of acute myocardial infarction or malignant arrhythmia.       Imaging Results              CT Lumbar Spine Without Contrast (Final result)  Result time 01/09/25 16:33:02      Final result by Manas Sorenson MD (01/09/25 16:33:02)                   Impression:      No CT evidence of lumbar spine acute osseous traumatic injury.    Lumbar spondylosis most prominent at L2-3, L4-5 and L5-S1 levels, as further outlined in the body of the report.    Other incidental/nonemergent findings in the body of the report.      Electronically signed by: Manas Sorenson  MD  Date:    01/09/2025  Time:    16:33               Narrative:    EXAMINATION:  CT LUMBAR SPINE WITHOUT CONTRAST    CLINICAL HISTORY:  Low back pain, trauma;    TECHNIQUE:  Low-dose axial, sagittal and coronal reformations are obtained through the lumbar spine.  Contrast was not administered.    COMPARISON:  Lumbar spine series 12/12/2024, MRI lumbar spine 07/17/2023, CT abdomen and pelvis 06/24/2023    FINDINGS:  Five non-rib-bearing lumbar type vertebral bodies.  Mild straightening of the usual lumbar lordosis.  Similar chronic minimal anterior wedge deformity of L1 vertebral body.  No evidence for acute vertebral compression fracture.  No displaced fracture, dislocation or significant listhesis.  Grossly stable nonspecific subcentimeter lucent lesion at the right iliac body near the SI joint.  No destructive osseous process.  No pars defect.  Multilevel degenerative disc disease with loss of disc height, endplate changes, marginal osteophytes with posterior disc osteophyte complexes and facet arthrosis most prominent at L4-5 level, not significantly progressed from most previous imaging.  Suspected multilevel small Schmorl's nodes, similar to prior.  No significant prevertebral soft tissue thickening.  No paraspinal mass or fluid collection.  No subcutaneous emphysema or radiopaque foreign body.    L1-2: Minimal bilateral neural foraminal narrowing, more so on the right.  No significant spinal canal stenosis.    L2-3: Posterior broad-based disc bulge resulting in mild acquired canal stenosis.  Mild bilateral neural foraminal narrowing, right more so than left.    L3-4: Posterior broad-based disc bulge resulting in minimal acquired canal stenosis.  Mild right and minimal left neural foraminal narrowing.    L4-5: Posterior disc osteophyte complex resulting in at least moderate acquired canal stenosis.  Moderate bilateral neural foraminal narrowing, left more so than right.    L5-S1: Posterior disc osteophyte  complex asymmetric to the right resulting in minimal acquired canal stenosis.  Moderate to severe bilateral foraminal narrowing, left more so than right.    Scattered atherosclerotic vascular calcifications.  No acute abnormality seen within the imaged abdomen or pelvis.  Age-related mild to moderate degenerative change at the bilateral SI joints.                                       CT Head Without Contrast (Final result)  Result time 01/09/25 12:09:59      Final result by Rivera Raza MD (01/09/25 12:09:59)                   Impression:      1. No definite acute intracranial findings by noncontrast CT.  2. Partially imaged soft tissue lesions within the superficial lobes of the bilateral parotid glands, with differential considerations to include intraparotid lymph nodes versus benign or malignant primary parotid neoplasms.  Further characterization with ultrasound and possible tissue sampling would be recommended.      Electronically signed by: Rivera Raza  Date:    01/09/2025  Time:    12:09               Narrative:    EXAMINATION:  CT HEAD WITHOUT CONTRAST    CLINICAL HISTORY:  Mental status change, unknown cause; Altered mental status, unspecified    TECHNIQUE:  Low dose axial images were obtained through the head.  Coronal and sagittal reformations were also performed. Contrast was not administered.    COMPARISON:  MRI of the brain performed 12/20/2024.    FINDINGS:  Blood: No acute intracranial hemorrhage.    Parenchyma: No definite loss of gray-white differentiation to suggest acute or subacute transcortical infarct. Generalized pattern of age-related parenchymal volume loss.  Nonspecific areas of white matter hypoattenuation may reflect sequela of chronic small vessel ischemic disease.    Ventricles/Extra-axial spaces: No abnormal extra-axial fluid collection. Basal cisterns are patent.    Vessels: Moderate atherosclerotic calcifications.    Orbits: Status post bilateral lens  replacements.    Scalp: Unremarkable.    Skull: There are no depressed skull fractures or destructive bone lesions.  Chronic appearing nasal bone deformity.    Sinuses and mastoids: Relatively minimal paranasal sinus mucosal thickening with small retention cysts.    Other findings: Partially imaged ovoid soft tissue lesions in the superficial lobes of bilateral parotid glands, measuring up to 11 mm on the right and 12 mm on the left (for example as seen on series 3, image 1).                                       Medications   HYDROcodone-acetaminophen  mg per tablet 1 tablet (has no administration in time range)   sodium chloride 0.9% bolus 1,000 mL 1,000 mL (0 mLs Intravenous Stopped 1/9/25 1612)   sodium chloride 0.9% bolus 1,000 mL 1,000 mL (1,000 mLs Intravenous New Bag 1/9/25 1515)   cloNIDine tablet 0.1 mg (0.1 mg Oral Given 1/9/25 1614)   hydrALAZINE tablet 50 mg (50 mg Oral Given 1/9/25 1614)     Medical Decision Making  Amount and/or Complexity of Data Reviewed  Independent Historian: EMS     Details: See HPI  Labs: ordered. Decision-making details documented in ED Course.  Radiology: ordered. Decision-making details documented in ED Course.  ECG/medicine tests: ordered and independent interpretation performed. Decision-making details documented in ED Course.    Risk  Prescription drug management.    Given the above, this patient presents emergency room after what sounds like an episode of near-syncope.  The patient reports that he lowered himself to the floor.  He reports that he did not hit hard.  His wife reports that he was confused after the event.  The patient did seem to be oriented on arrival.  He was appropriate conversation with me.  He does have trouble remembering the day of the week.  The scalp is atraumatic.  The patient has a surgery on Monday in his lumbar spine.  He did not have lumbar back pain before the fall.  He now has back pain after the fall.  The patient was seen and  evaluated by neuro surgery in the emergency room who requested a CT scan of the lumbar spine.  The study was done.  The CT scan looks good and neuro surgery has no recommendations for further intervention with regard to the patient's lumbar spine.  The patient reports that he was walking without difficulty before the fall and now has a lot of pain in his lumbar spine and can not walk.  At the same time there are no neurologic deficits when the patient is examined in the bed.  The patient reports she can not urinate laying down.  We tried to do a straight catheterization.  The patient could not tolerate catheterization.  We will run IV fluids wide open until the patient voids.  I find no evidence of trauma to the chest abdomen extremities remainder of the spine or the scalp.  A CT head was negative for significant abnormalities.  Chemistries revealed a slightly elevated BUN and creatinine.  Perhaps this patient is slightly dehydrated.  He has a mild anemia with a hemoglobin of 12.7.   I examined the patient again at 3:48 p.m..  He has a normal mental status.  He was normal in conversation.  His daughter was in the room.  She is concerned that something is not right .  She has concerns about him not being able to walk at home.  I will offer pain medicine, a urinal, and a fracture pan.   This patient will be signed out to Dr. Yost, pending final disposition.         Scribe Attestation:   Scribe #1: I performed the above scribed service and the documentation accurately describes the services I performed. I attest to the accuracy of the note.        ED Course as of 01/09/25 2117   Thu Jan 09, 2025   1730 On re-evaluation, patient is alert, oriented and reports having back pain that is at similar levels to before his fall.  He denies syncope but does endorse near-syncope with his fall.  His workup is overall reassuring.  He has not had any pain medication while in the ED. we will give patient dose of his home pain  medication.  Patient will be discharged with PCP and neurosurgery follow up.  Patient's wife at the bedside had all of her questions answered.  She is in agreement with the plan. [KI]   1840 Nursing went to discharge patient, patient unable to ambulate due to pain. Will discuss admission with hospital medicine for PT/OT, pain control [KI]   1906 Patient has been accepted by Hospital Medicine [KI]      ED Course User Index  [KI] Kiersten Yost MD                           Clinical Impression:  Final diagnoses:  [W19.XXXA] Fall  [R41.82] Altered mental status  [R55] Syncope and collapse (Primary)  [M54.50] Acute midline low back pain without sciatica - post fall, sp laminoforaminotomy  [R41.82] Altered mental status, unspecified altered mental status type - Transient after syncope     Please note that the documentation on this chart was provided by the scribe above on the date of service noted above, and that the documentation in the chart accurately reflects the work and decisions made by me alone.  Signed, Dr. Ventura     ED Disposition Condition    Discharge Stable          ED Prescriptions    None       Follow-up Information       Follow up With Specialties Details Why Contact Info    Jono Willoughby MD Internal Medicine Schedule an appointment as soon as possible for a visit   [address] [facility]  [place] [id number] [phone]      Luis M Ellis MD Neurosurgery Schedule an appointment as soon as possible for a visit   [address]  [facility] [address]  [phone]               Kiersten Yost MD  01/09/25 8727

## 2025-01-09 NOTE — ED NOTES
Pt tolerated lying and sitting orthostatic vitals well. Pt reports his back is hurting too much for him to stand up. MD notified.

## 2025-01-10 ENCOUNTER — PATIENT OUTREACH (OUTPATIENT)
Dept: ADMINISTRATIVE | Facility: OTHER | Age: 75
End: 2025-01-10
Payer: MEDICARE
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
## 2025-01-10 PROBLEM — R55 SYNCOPE AND COLLAPSE: Status: ACTIVE | Noted: 2025-01-10

## 2025-01-10 PROBLEM — R00.1 BRADYCARDIA: Status: ACTIVE | Noted: 2025-01-10

## 2025-01-10 PROBLEM — G93.40 ENCEPHALOPATHY: Status: ACTIVE | Noted: 2025-01-10

## 2025-01-10 PROBLEM — E11.42 TYPE 2 DIABETES MELLITUS WITH PERIPHERAL NEUROPATHY: Status: ACTIVE | Noted: 2017-01-24
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
## 2025-01-10 LAB
ALBUMIN SERPL BCP-MCNC: 3.3 G/DL (ref 3.5–5.2)
ALP SERPL-CCNC: 26 U/L (ref 40–150)
ALT SERPL W/O P-5'-P-CCNC: 14 U/L (ref 10–44)
ANION GAP SERPL CALC-SCNC: 9 MMOL/L (ref 8–16)
AORTIC ROOT ANNULUS: 3.47 CM
AORTIC VALVE CUSP SEPERATION: 1.85 CM
AST SERPL-CCNC: 16 U/L (ref 10–40)
AV INDEX (PROSTH): 0.59
AV MEAN GRADIENT: 5.3 MMHG
AV PEAK GRADIENT: 9 MMHG
AV VALVE AREA BY VELOCITY RATIO: 1.9 CM²
AV VALVE AREA: 1.9 CM²
AV VELOCITY RATIO: 0.6
BASOPHILS # BLD AUTO: 0.01 K/UL (ref 0–0.2)
BASOPHILS NFR BLD: 0.1 % (ref 0–1.9)
BILIRUB SERPL-MCNC: 0.8 MG/DL (ref 0.1–1)
BSA FOR ECHO PROCEDURE: 1.94 M2
BUN SERPL-MCNC: 24 MG/DL (ref 8–23)
CALCIUM SERPL-MCNC: 9.4 MG/DL (ref 8.7–10.5)
CHLORIDE SERPL-SCNC: 111 MMOL/L (ref 95–110)
CO2 SERPL-SCNC: 20 MMOL/L (ref 23–29)
CREAT SERPL-MCNC: 1.1 MG/DL (ref 0.5–1.4)
CV ECHO LV RWT: 0.62 CM
DIFFERENTIAL METHOD BLD: ABNORMAL
DOP CALC AO PEAK VEL: 1.5 M/S
DOP CALC AO VTI: 31.9 CM
DOP CALC LVOT AREA: 3.1 CM2
DOP CALC LVOT DIAMETER: 2 CM
DOP CALC LVOT PEAK VEL: 0.9 M/S
DOP CALC LVOT STROKE VOLUME: 59.3 CM3
DOP CALCLVOT PEAK VEL VTI: 18.9 CM
ECHO LV POSTERIOR WALL: 1.3 CM (ref 0.6–1.1)
EOSINOPHIL # BLD AUTO: 0.5 K/UL (ref 0–0.5)
EOSINOPHIL NFR BLD: 6.2 % (ref 0–8)
ERYTHROCYTE [DISTWIDTH] IN BLOOD BY AUTOMATED COUNT: 15.2 % (ref 11.5–14.5)
EST. GFR  (NO RACE VARIABLE): >60 ML/MIN/1.73 M^2
FRACTIONAL SHORTENING: 38.1 % (ref 28–44)
GLUCOSE SERPL-MCNC: 76 MG/DL (ref 70–110)
HCT VFR BLD AUTO: 42.8 % (ref 40–54)
HGB BLD-MCNC: 13.4 G/DL (ref 14–18)
IMM GRANULOCYTES # BLD AUTO: 0.02 K/UL (ref 0–0.04)
IMM GRANULOCYTES NFR BLD AUTO: 0.3 % (ref 0–0.5)
INTERVENTRICULAR SEPTUM: 1.3 CM (ref 0.6–1.1)
IVC DIAMETER: 2.4 CM
IVRT: 100.86 MSEC
LA MAJOR: 6.15 CM
LA MINOR: 5.84 CM
LA WIDTH: 4.1 CM
LEFT ATRIUM SIZE: 4.85 CM
LEFT ATRIUM VOLUME INDEX: 53 ML/M2
LEFT ATRIUM VOLUME: 101.26 CM3
LEFT INTERNAL DIMENSION IN SYSTOLE: 2.6 CM (ref 2.1–4)
LEFT VENTRICLE DIASTOLIC VOLUME INDEX: 41.13 ML/M2
LEFT VENTRICLE DIASTOLIC VOLUME: 78.56 ML
LEFT VENTRICLE MASS INDEX: 105 G/M2
LEFT VENTRICLE SYSTOLIC VOLUME INDEX: 12.9 ML/M2
LEFT VENTRICLE SYSTOLIC VOLUME: 24.66 ML
LEFT VENTRICULAR INTERNAL DIMENSION IN DIASTOLE: 4.2 CM (ref 3.5–6)
LEFT VENTRICULAR MASS: 200.6 G
LVED V (TEICH): 78.56 ML
LVES V (TEICH): 24.66 ML
LVOT MG: 1.65 MMHG
LVOT MV: 0.61 CM/S
LYMPHOCYTES # BLD AUTO: 1.7 K/UL (ref 1–4.8)
LYMPHOCYTES NFR BLD: 22.3 % (ref 18–48)
MAGNESIUM SERPL-MCNC: 1.7 MG/DL (ref 1.6–2.6)
MCH RBC QN AUTO: 26.5 PG (ref 27–31)
MCHC RBC AUTO-ENTMCNC: 31.3 G/DL (ref 32–36)
MCV RBC AUTO: 85 FL (ref 82–98)
MONOCYTES # BLD AUTO: 0.6 K/UL (ref 0.3–1)
MONOCYTES NFR BLD: 8 % (ref 4–15)
MV PEAK E VEL: 1.24 M/S
NEUTROPHILS # BLD AUTO: 4.9 K/UL (ref 1.8–7.7)
NEUTROPHILS NFR BLD: 63.1 % (ref 38–73)
NRBC BLD-RTO: 0 /100 WBC
OHS CV RV/LV RATIO: 1 CM
PHOSPHATE SERPL-MCNC: 3.6 MG/DL (ref 2.7–4.5)
PISA TR MAX VEL: 4.43 M/S
PLATELET # BLD AUTO: 210 K/UL (ref 150–450)
PMV BLD AUTO: 9.7 FL (ref 9.2–12.9)
POCT GLUCOSE: 115 MG/DL (ref 70–110)
POCT GLUCOSE: 138 MG/DL (ref 70–110)
POCT GLUCOSE: 81 MG/DL (ref 70–110)
POCT GLUCOSE: 84 MG/DL (ref 70–110)
POTASSIUM SERPL-SCNC: 4.5 MMOL/L (ref 3.5–5.1)
PROT SERPL-MCNC: 6.4 G/DL (ref 6–8.4)
PV PEAK GRADIENT: 5 MMHG
PV PEAK VELOCITY: 1.17 M/S
RA MAJOR: 5.73 CM
RA PRESSURE ESTIMATED: 15 MMHG
RA WIDTH: 4.5 CM
RBC # BLD AUTO: 5.06 M/UL (ref 4.6–6.2)
RIGHT VENTRICLE DIASTOLIC BASEL DIMENSION: 4.2 CM
RIGHT VENTRICULAR END-DIASTOLIC DIMENSION: 4.23 CM
RV TB RVSP: 19 MMHG
RV TISSUE DOPPLER FREE WALL SYSTOLIC VELOCITY 1 (APICAL 4 CHAMBER VIEW): 8.53 CM/S
SINUS: 3.18 CM
SODIUM SERPL-SCNC: 140 MMOL/L (ref 136–145)
STJ: 2 CM
T4 FREE SERPL-MCNC: 1.12 NG/DL (ref 0.71–1.51)
TR MAX PG: 78 MMHG
TRICUSPID ANNULAR PLANE SYSTOLIC EXCURSION: 1.12 CM
TSH SERPL DL<=0.005 MIU/L-ACNC: 8.72 UIU/ML (ref 0.4–4)
TV REST PULMONARY ARTERY PRESSURE: 93 MMHG
WBC # BLD AUTO: 7.76 K/UL (ref 3.9–12.7)
Z-SCORE OF LEFT VENTRICULAR DIMENSION IN END DIASTOLE: -2.47
Z-SCORE OF LEFT VENTRICULAR DIMENSION IN END SYSTOLE: -1.91

## 2025-01-10 PROCEDURE — 94799 UNLISTED PULMONARY SVC/PX: CPT | Mod: HCNC

## 2025-01-10 PROCEDURE — 80053 COMPREHEN METABOLIC PANEL: CPT | Mod: HCNC

## 2025-01-10 PROCEDURE — 4A03X5D MEASUREMENT OF ARTERIAL FLOW, INTRACRANIAL, EXTERNAL APPROACH: ICD-10-PCS | Performed by: RADIOLOGY

## 2025-01-10 PROCEDURE — 63600175 PHARM REV CODE 636 W HCPCS: Mod: HCNC

## 2025-01-10 PROCEDURE — 25000242 PHARM REV CODE 250 ALT 637 W/ HCPCS: Mod: HCNC

## 2025-01-10 PROCEDURE — 25000003 PHARM REV CODE 250: Mod: HCNC

## 2025-01-10 PROCEDURE — 99900035 HC TECH TIME PER 15 MIN (STAT): Mod: HCNC

## 2025-01-10 PROCEDURE — 95816 EEG AWAKE AND DROWSY: CPT | Mod: 26,HCNC,, | Performed by: INTERNAL MEDICINE

## 2025-01-10 PROCEDURE — 11000001 HC ACUTE MED/SURG PRIVATE ROOM: Mod: HCNC

## 2025-01-10 PROCEDURE — 99223 1ST HOSP IP/OBS HIGH 75: CPT | Mod: HCNC,,, | Performed by: STUDENT IN AN ORGANIZED HEALTH CARE EDUCATION/TRAINING PROGRAM

## 2025-01-10 PROCEDURE — 84439 ASSAY OF FREE THYROXINE: CPT | Mod: HCNC

## 2025-01-10 PROCEDURE — 25500020 PHARM REV CODE 255: Mod: HCNC | Performed by: HOSPITALIST

## 2025-01-10 PROCEDURE — 85025 COMPLETE CBC W/AUTO DIFF WBC: CPT | Mod: HCNC

## 2025-01-10 PROCEDURE — 97162 PT EVAL MOD COMPLEX 30 MIN: CPT | Mod: HCNC

## 2025-01-10 PROCEDURE — 25000003 PHARM REV CODE 250: Mod: HCNC | Performed by: PHYSICIAN ASSISTANT

## 2025-01-10 PROCEDURE — 83735 ASSAY OF MAGNESIUM: CPT | Mod: HCNC

## 2025-01-10 PROCEDURE — 25000003 PHARM REV CODE 250: Mod: HCNC | Performed by: STUDENT IN AN ORGANIZED HEALTH CARE EDUCATION/TRAINING PROGRAM

## 2025-01-10 PROCEDURE — 97165 OT EVAL LOW COMPLEX 30 MIN: CPT | Mod: HCNC

## 2025-01-10 PROCEDURE — 84100 ASSAY OF PHOSPHORUS: CPT | Mod: HCNC

## 2025-01-10 PROCEDURE — 95816 EEG AWAKE AND DROWSY: CPT | Mod: HCNC

## 2025-01-10 PROCEDURE — 84443 ASSAY THYROID STIM HORMONE: CPT | Mod: HCNC

## 2025-01-10 RX ORDER — HYDRALAZINE HYDROCHLORIDE 20 MG/ML
10 INJECTION INTRAMUSCULAR; INTRAVENOUS EVERY 6 HOURS PRN
Status: DISCONTINUED | OUTPATIENT
Start: 2025-01-10 | End: 2025-01-10

## 2025-01-10 RX ORDER — CLOPIDOGREL BISULFATE 75 MG/1
75 TABLET ORAL DAILY
Status: DISCONTINUED | OUTPATIENT
Start: 2025-01-11 | End: 2025-01-11

## 2025-01-10 RX ORDER — DIPHENHYDRAMINE HCL 25 MG
25 CAPSULE ORAL EVERY 6 HOURS PRN
Status: DISCONTINUED | OUTPATIENT
Start: 2025-01-10 | End: 2025-01-11

## 2025-01-10 RX ORDER — HYDRALAZINE HYDROCHLORIDE 20 MG/ML
5 INJECTION INTRAMUSCULAR; INTRAVENOUS EVERY 6 HOURS PRN
Status: DISCONTINUED | OUTPATIENT
Start: 2025-01-10 | End: 2025-01-11

## 2025-01-10 RX ORDER — INSULIN ASPART 100 [IU]/ML
0-5 INJECTION, SOLUTION INTRAVENOUS; SUBCUTANEOUS EVERY 4 HOURS PRN
Status: DISCONTINUED | OUTPATIENT
Start: 2025-01-10 | End: 2025-01-11

## 2025-01-10 RX ORDER — HYDRALAZINE HYDROCHLORIDE 25 MG/1
50 TABLET, FILM COATED ORAL 2 TIMES DAILY
Status: DISCONTINUED | OUTPATIENT
Start: 2025-01-10 | End: 2025-01-10

## 2025-01-10 RX ORDER — HYDROMORPHONE HYDROCHLORIDE 1 MG/ML
1 INJECTION, SOLUTION INTRAMUSCULAR; INTRAVENOUS; SUBCUTANEOUS
Status: DISCONTINUED | OUTPATIENT
Start: 2025-01-10 | End: 2025-01-10

## 2025-01-10 RX ORDER — OLANZAPINE 10 MG/2ML
2.5 INJECTION, POWDER, FOR SOLUTION INTRAMUSCULAR ONCE AS NEEDED
Status: DISCONTINUED | OUTPATIENT
Start: 2025-01-10 | End: 2025-01-11

## 2025-01-10 RX ORDER — HYDROMORPHONE HYDROCHLORIDE 1 MG/ML
1 INJECTION, SOLUTION INTRAMUSCULAR; INTRAVENOUS; SUBCUTANEOUS ONCE
Status: COMPLETED | OUTPATIENT
Start: 2025-01-10 | End: 2025-01-10

## 2025-01-10 RX ORDER — ISOSORBIDE MONONITRATE 30 MG/1
120 TABLET, EXTENDED RELEASE ORAL DAILY
Status: DISCONTINUED | OUTPATIENT
Start: 2025-01-10 | End: 2025-01-10

## 2025-01-10 RX ORDER — OLANZAPINE 10 MG/2ML
5 INJECTION, POWDER, FOR SOLUTION INTRAMUSCULAR ONCE AS NEEDED
Status: DISCONTINUED | OUTPATIENT
Start: 2025-01-10 | End: 2025-01-10

## 2025-01-10 RX ADMIN — HYDRALAZINE HYDROCHLORIDE 50 MG: 25 TABLET ORAL at 12:01

## 2025-01-10 RX ADMIN — ATORVASTATIN CALCIUM 80 MG: 40 TABLET, FILM COATED ORAL at 09:01

## 2025-01-10 RX ADMIN — IOHEXOL 100 ML: 350 INJECTION, SOLUTION INTRAVENOUS at 06:01

## 2025-01-10 RX ADMIN — FLUTICASONE PROPIONATE 100 MCG: 50 SPRAY, METERED NASAL at 09:01

## 2025-01-10 RX ADMIN — HYDRALAZINE HYDROCHLORIDE 50 MG: 25 TABLET ORAL at 08:01

## 2025-01-10 RX ADMIN — HYDROCODONE BITARTRATE AND ACETAMINOPHEN 1 TABLET: 10; 325 TABLET ORAL at 06:01

## 2025-01-10 RX ADMIN — DIPHENHYDRAMINE HYDROCHLORIDE 25 MG: 25 CAPSULE ORAL at 05:01

## 2025-01-10 RX ADMIN — APIXABAN 5 MG: 5 TABLET, FILM COATED ORAL at 10:01

## 2025-01-10 RX ADMIN — HYDROMORPHONE HYDROCHLORIDE 1 MG: 1 INJECTION, SOLUTION INTRAMUSCULAR; INTRAVENOUS; SUBCUTANEOUS at 01:01

## 2025-01-10 RX ADMIN — HYDRALAZINE HYDROCHLORIDE 10 MG: 20 INJECTION INTRAMUSCULAR; INTRAVENOUS at 06:01

## 2025-01-10 RX ADMIN — HYDROCODONE BITARTRATE AND ACETAMINOPHEN 1 TABLET: 10; 325 TABLET ORAL at 12:01

## 2025-01-10 RX ADMIN — HYDROCODONE BITARTRATE AND ACETAMINOPHEN 1 TABLET: 10; 325 TABLET ORAL at 10:01

## 2025-01-10 RX ADMIN — Medication 6 MG: at 10:01

## 2025-01-10 RX ADMIN — POLYETHYLENE GLYCOL 3350 17 G: 17 POWDER, FOR SOLUTION ORAL at 08:01

## 2025-01-10 RX ADMIN — FENOFIBRATE 160 MG: 160 TABLET ORAL at 06:01

## 2025-01-10 RX ADMIN — ISOSORBIDE MONONITRATE 120 MG: 30 TABLET, EXTENDED RELEASE ORAL at 11:01

## 2025-01-10 RX ADMIN — AZELASTINE HYDROCHLORIDE 137 MCG: 137 SPRAY, METERED NASAL at 09:01

## 2025-01-10 NOTE — PT/OT/SLP EVAL
Physical Therapy Evaluation    Patient Name:  Driss Hernandez Jr.   MRN:  [id number]    Recommendations:     Discharge Recommendations: Low Intensity Therapy   Discharge Equipment Recommendations: walker, rolling   Barriers to discharge: None    Assessment:     Driss Hernandez Jr. is a 74 y.o. male admitted with a medical diagnosis of Encephalopathy.  He presents with the following impairments/functional limitations: weakness, impaired functional mobility, gait instability, pain, decreased lower extremity function.    Did not perform longer distance ambulation due to increased BP. At rest /89, sitting /88, and once pt was returned to supine 195/79.    Rehab Prognosis: Good; patient would benefit from acute skilled PT services to address these deficits and reach maximum level of function.    Recent Surgery: * No surgery found *      Plan:     During this hospitalization, patient to be seen 2 x/week to address the identified rehab impairments via gait training, therapeutic activities, therapeutic exercises, neuromuscular re-education and progress toward the following goals:    Plan of Care Expires:  01/31/25    Subjective     Chief Complaint: Pt states that his pain has gotten quite a bit better since he has been in the hospital   Patient/Family Comments/goals: Wife states that she thinks he slightly cognitively impaired because he keeps asking some of the same questions over and over.   Pain/Comfort:  Pain Rating 1: 5/10  Location - Side 1: Bilateral  Location - Orientation 1: lower  Location 1: back  Pain Addressed 1: Reposition    Patients cultural, spiritual, Roman [employer] conflicts given the current situation: no    Living Environment:  Lives home with wife with one threshold to enter  Prior to admission, patients level of function was independent.  Equipment used at home: none.  DME owned (not currently used): none.  Upon discharge, patient will have assistance from wife.    Objective:      Communicated with nursing prior to session.  Patient found HOB elevated with telemetry, blood pressure cuff  upon PT entry to room.    General Precautions: Standard, fall  Orthopedic Precautions:spinal precautions   Braces: N/A  Respiratory Status: Room air    Exams:  Cognitive Exam:  Patient is oriented to Person, Place, Time, and Situation  Gross Motor Coordination:  WFL  Sensation:    -       Intact  RLE ROM: WFL  RLE Strength: WFL  LLE ROM: WFL  LLE Strength: WFL    Functional Mobility:  Bed Mobility:     Rolling Right: stand by assistance and verbal cues for log rolling technique  Scooting: modified independence  Supine to Sit: minimum assistance  Sit to Supine: minimum assistance  Transfers:     Sit to Stand:  supervision with rolling walker  Gait: side step to HOB approx 2 ft, RW, CGA , overall pt stable, did not walk further due to elevated BP  Balance: Static Sit and Stand with RW Good       AM-PAC 6 CLICK MOBILITY  Total Score:20       Treatment & Education:  Eval    Patient left HOB elevated with all lines intact, call button in reach, nursing notified, and wife present.    GOALS:   Multidisciplinary Problems       Physical Therapy Goals          Problem: Physical Therapy    Goal Priority Disciplines Outcome Interventions   Physical Therapy Goal     PT, PT/OT Progressing    Description: Goals to be met by: 25     Patient will increase functional independence with mobility by performin. Supine to sit with Modified [place]  2. Sit to supine with Modified [place]  3. Gait  x 150 feet with Modified [place] using Rolling Walker.                          History:     Past Medical History:   Diagnosis Date    Chronic heart failure with preserved ejection fraction 2023    Chronic midline low back pain without sciatica 10/2/2017    Colon polyps     Coronary artery disease involving native coronary artery of native heart 3/5/2020    Coronary artery disease involving native  coronary artery of native heart with angina pectoris 12/10/2020    Diabetes mellitus with neurological manifestations, uncontrolled 1/24/2017    Diabetic polyneuropathy associated with type 2 diabetes mellitus 1/24/2017    Diabetic polyneuropathy associated with type 2 diabetes mellitus     Encephalopathy 1/10/2025    Essential hypertension 1/24/2017    Gastroesophageal reflux disease 1/24/2017    Hyperlipidemia 1/24/2017    Insomnia 1/24/2017    Long-term insulin use 1/24/2017    Longstanding persistent atrial fibrillation 12/30/2020    Lumbar spondylosis 11/13/2017    Nuclear sclerosis of both eyes 8/24/2017    Obesity     PAD (peripheral artery disease) 3/23/2022    Stage 3 chronic kidney disease 2/13/2019    Uncontrolled type 2 diabetes mellitus without complication, with long-term current use of insulin 1/24/2017       Past Surgical History:   Procedure Laterality Date    ARTHROSCOPIC REPAIR OF ROTATOR CUFF OF SHOULDER Right 7/5/2022    Procedure: REPAIR, ROTATOR CUFF, ARTHROSCOPIC;  Surgeon: Valerie Olivera MD;  Location: [facility] OR;  Service: Orthopedics;  Laterality: Right;  GUADALUPE AND NEPHJENNIFER LEMUS [phone]/ TEXTED ALLAN ON 6/23/2022 @ 9:47AM. ALLAN RESPONDED ON 6/23/2022 @ 10:33AM-LO  JEAN PAUL BABIN [phone] MY BE HERE FOR CASE SPOKE TO HIM @ 9:59AM ON 7-1-2022  RN PREOP 6/28/2022    BACK SURGERY      2002    CATARACT EXTRACTION W/  INTRAOCULAR LENS IMPLANT Right 08/29/2017    Dr. Hong    CATARACT EXTRACTION W/  INTRAOCULAR LENS IMPLANT Left 02/24/2022    Dr. Hogn    CAUDAL EPIDURAL STEROID INJECTION N/A 9/25/2024    Procedure: Caudal Epidural Steroid Injection;  Surgeon: Eze Byrd Jr., MD;  Location: [facility] PAIN MANAGEMENT;  Service: Pain Management;  Laterality: N/A;  @1100(prev. 715)  Eliquis last 9/21  Plavix last 9/19  Check BG  E-Consent    COLONOSCOPY N/A 2/21/2017    Procedure: COLONOSCOPY;  Surgeon: Robb Rosado MD;  Location: [facility] ENDO;  Service: Endoscopy;   Laterality: N/A;    COLONOSCOPY N/A 7/5/2023    Procedure: COLONOSCOPY;  Surgeon: Aston Varela MD;  Location: [facility] ENDO;  Service: Endoscopy;  Laterality: N/A;  Referral: JOVANNI SCANLON  ok to hold Plavix 5 days and Eliquis 2 days per Dr Ford   Meds  Suprep   Inst portal   LW    CORONARY ANGIOGRAPHY INCLUDING BYPASS GRAFTS WITH CATHETERIZATION OF LEFT HEART N/A 11/11/2020    Procedure: ANGIOGRAM, CORONARY, INCLUDING BYPASS GRAFT, WITH LEFT HEART CATHETERIZATION;  Surgeon: Lane Cuellar MD;  Location: [facility] CATH LAB;  Service: Cardiology;  Laterality: N/A;  RN PRE OP 11-5-2020. --COVID NEGATIVE ON  11-. C A    CORONARY ARTERY BYPASS GRAFT      March 2016    EPIDURAL STEROID INJECTION Bilateral 11/14/2018    Procedure: Lumbar Medial Branch Blocks;  Surgeon: Eze Byrd Jr., MD;  Location: [facility] ENDO;  Service: Pain Management;  Laterality: Bilateral;  Bilateral Lumbar Medial Branch Blocks L2, L3, L4, L5    98510  72010    Arrive @ 1150; NO Sedation    EPIDURAL STEROID INJECTION Bilateral 11/28/2018    Procedure: Injection, Steroid, Epidural;  Surgeon: Eze Byrd Jr., MD;  Location: [facility] ENDO;  Service: Pain Management;  Laterality: Bilateral;  Bilateral Sacroiliac Joint Steroid Injections    31889    Arrive @ 0910    EPIDURAL STEROID INJECTION Bilateral 3/20/2019    Procedure: Injection, Steroid, Sacroiliiac Joint;  Surgeon: zEe Byrd Jr., MD;  Location: [facility];  Service: Pain Management;  Laterality: Bilateral;  Bilateral Sacroiliac Joint Steroid Injections    [id number]    Arrive @ 1145; Trigger point injections also?    EPIDURAL STEROID INJECTION Right 8/2/2023    Procedure: Right L5 Transforaminal Epidural Steroid Injection;  Surgeon: Eze Byrd Jr., MD;  Location: [facility] ENDO;  Service: Pain Management;  Laterality: Right;  @0830 (given)  Eliquis last 7/29  Plavix last 7/27  Check BG    EPIDURAL STEROID INJECTION Right 10/11/2023    Procedure: Right L3  (infraneural) + S1 Transforaminal Epidural Steroid Injections;  Surgeon: Eze Byrd Jr., MD;  Location: [facility] ENDO;  Service: Pain Management;  Laterality: Right;  @0745 (requests morning)  Eliquis 10/7 & Plavix 10  Check BG    ESOPHAGOGASTRODUODENOSCOPY N/A 2023    Procedure: EGD (ESOPHAGOGASTRODUODENOSCOPY);  Surgeon: Anita Oswald MD;  Location: [facility] ENDO;  Service: Endoscopy;  Laterality: N/A;  Procedure Timin-4 weeks  Provider: Any GI provider  Location: No Preference  Additional Scheduling Information: Blood thinners  Prep Specifications:N/A   ref. by Dr. Light, instr. to portal, , [facility] meds-st  ok to hold Plavix 5 days and Eliquis 2 day    INTRAOCULAR PROSTHESES INSERTION Left 2022    Procedure: INSERTION, IOL PROSTHESIS;  Surgeon: Nickolas Hong MD;  Location: [facility] OR;  Service: Ophthalmology;  Laterality: Left;    LAMINOFORAMINOTOMY OF SPINE USING MINIMALLY INVASIVE TECHNIQUE Right 2025    Procedure: LAMINOFORAMINOTOMY, SPINE, MINIMALLY INVASIVE;  Surgeon: Luis M Ellis MD;  Location: [facility] OR;  Service: Neurosurgery;  Laterality: Right;  Right L4/5 minimally invasive laminotomy/microdiscectomy. silvana table, lucio frame, fluoro, microscope, no vendor, no monitoring  CLEARED BY CARDS AND PCP   RN PREOP 24---T/S--done -----NEED ORDERS    PHACOEMULSIFICATION OF CATARACT Left 2022    Procedure: PHACOEMULSIFICATION, CATARACT;  Surgeon: Nickolas Hong MD;  Location: [facility] OR;  Service: Ophthalmology;  Laterality: Left;  RN Phone Pre Op 22.  Covid NEGATIVE  22.  Arrival 08:00 am.    TONSILLECTOMY         Time Tracking:     PT Received On:    PT Start Time: 949     PT Stop Time:   PT Total Time (min): 13 min     Billable Minutes: Evaluation 1      01/10/2025

## 2025-01-10 NOTE — ASSESSMENT & PLAN NOTE
Presented after a fall vs near syncope at home. Oriented to most topics of conversation, but disoriented to specifics and not baseline mental status per wife. CT head without abnormality. Unclear as to etiology, possibly polypharmacy due to opiates, robaxin, halcion, gabapentin. Or head trauma with fall and post concussive?  Neuro consulted  Offending medications held  MRI brain  Check echo for causes of syncope

## 2025-01-10 NOTE — ASSESSMENT & PLAN NOTE
Patient has paroxysmal (<7 days) atrial fibrillation. [id number] Score: 4. The patients heart rate in the last 24 hours is as follows:  Pulse  Min: 47  Max: 61     Antiarrhythmics       Anticoagulants       Plan  - Replete lytes with a goal of K>4, Mg >2  - Patient is anticoagulated, see medications listed above.  - Patient's afib is currently controlled  - resume eliquis, not on BB history of previous bradycardia per outpt cardio notes

## 2025-01-10 NOTE — PLAN OF CARE
Problem: Adult Inpatient Plan of Care  Goal: Plan of Care Review  Outcome: Progressing     Problem: Fall Injury Risk  Goal: Absence of Fall and Fall-Related Injury  Outcome: Progressing     Problem: Skin Injury Risk Increased  Goal: Skin Health and Integrity  Outcome: Progressing     Problem: Pain Acute  Goal: Optimal Pain Control and Function  Outcome: Progressing

## 2025-01-10 NOTE — HOSPITAL COURSE
Driss Hernandez Jr. 74 y.o. male placed in observation for fall at home and associated altered mental status.  He has been after a fall versus near syncopal episode that has unwitnessed by his family.  In the ED his CTA was without acute intracranial abnormality his CT lumbar spine and no evidence of osseous traumatic injury.  He had difficulty ambulating.  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 No evidence of stenosis or thombosis on CTA. Resumed on eliquis. Seen by PT/OT and recommended for .

## 2025-01-10 NOTE — SUBJECTIVE & OBJECTIVE
Past Medical History:   Diagnosis Date    Chronic heart failure with preserved ejection fraction 2/9/2023    Chronic midline low back pain without sciatica 10/2/2017    Colon polyps     Coronary artery disease involving native coronary artery of native heart 3/5/2020    Coronary artery disease involving native coronary artery of native heart with angina pectoris 12/10/2020    Diabetes mellitus with neurological manifestations, uncontrolled 1/24/2017    Diabetic polyneuropathy associated with type 2 diabetes mellitus 1/24/2017    Diabetic polyneuropathy associated with type 2 diabetes mellitus     Encephalopathy 1/10/2025    Essential hypertension 1/24/2017    Gastroesophageal reflux disease 1/24/2017    Hyperlipidemia 1/24/2017    Insomnia 1/24/2017    Long-term insulin use 1/24/2017    Longstanding persistent atrial fibrillation 12/30/2020    Lumbar spondylosis 11/13/2017    Nuclear sclerosis of both eyes 8/24/2017    Obesity     PAD (peripheral artery disease) 3/23/2022    Stage 3 chronic kidney disease 2/13/2019    Uncontrolled type 2 diabetes mellitus without complication, with long-term current use of insulin 1/24/2017       Past Surgical History:   Procedure Laterality Date    ARTHROSCOPIC REPAIR OF ROTATOR CUFF OF SHOULDER Right 7/5/2022    Procedure: REPAIR, ROTATOR CUFF, ARTHROSCOPIC;  Surgeon: Valerie Olivera MD;  Location: [facility];  Service: Orthopedics;  Laterality: Right;  ANAYELI AND NEPHJENNIFER LEMUS [phone]/ TEXTED ALLAN ON 6/23/2022 @ 9:47AM. ALLAN RESPONDED ON 6/23/2022 @ 10:33AM-BREANNA BABIN [phone] MY BE HERE FOR CASE SPOKE TO HIM @ 9:59AM ON 7-1-2022  RN PREOP 6/28/2022    BACK SURGERY      2002    CATARACT EXTRACTION W/  INTRAOCULAR LENS IMPLANT Right 08/29/2017    Dr. Hong    CATARACT EXTRACTION W/  INTRAOCULAR LENS IMPLANT Left 02/24/2022    Dr. Hong    CAUDAL EPIDURAL STEROID INJECTION N/A 9/25/2024    Procedure: Caudal Epidural Steroid Injection;  Surgeon:  Eze Byrd Jr., MD;  Location: [facility] PAIN MANAGEMENT;  Service: Pain Management;  Laterality: N/A;  @1100(prev. 715)  Eliquis last 9/21  Plavix last 9/19  Check BG  E-Consent    COLONOSCOPY N/A 2/21/2017    Procedure: COLONOSCOPY;  Surgeon: Robb Rosado MD;  Location: [facility] ENDO;  Service: Endoscopy;  Laterality: N/A;    COLONOSCOPY N/A 7/5/2023    Procedure: COLONOSCOPY;  Surgeon: Aston Varela MD;  Location: [facility] ENDO;  Service: Endoscopy;  Laterality: N/A;  Referral: JOVANNI SCANLON  ok to hold Plavix 5 days and Eliquis 2 days per Dr Purdy-OFELIA   Meds  Suprep   Inst portal   LW    CORONARY ANGIOGRAPHY INCLUDING BYPASS GRAFTS WITH CATHETERIZATION OF LEFT HEART N/A 11/11/2020    Procedure: ANGIOGRAM, CORONARY, INCLUDING BYPASS GRAFT, WITH LEFT HEART CATHETERIZATION;  Surgeon: Lane Cuellar MD;  Location: [facility] CATH LAB;  Service: Cardiology;  Laterality: N/A;  RN PRE OP 11-5-2020. --COVID NEGATIVE ON  11-. C A    CORONARY ARTERY BYPASS GRAFT      March 2016    EPIDURAL STEROID INJECTION Bilateral 11/14/2018    Procedure: Lumbar Medial Branch Blocks;  Surgeon: Eze Byrd Jr., MD;  Location: [facility];  Service: Pain Management;  Laterality: Bilateral;  Bilateral Lumbar Medial Branch Blocks L2, L3, L4, L5    [id number]  [id number]    Arrive @ 1150; NO Sedation    EPIDURAL STEROID INJECTION Bilateral 11/28/2018    Procedure: Injection, Steroid, Epidural;  Surgeon: Eze Byrd Jr., MD;  Location: [facility];  Service: Pain Management;  Laterality: Bilateral;  Bilateral Sacroiliac Joint Steroid Injections    [id number]    Arrive @ 0910    EPIDURAL STEROID INJECTION Bilateral 3/20/2019    Procedure: Injection, Steroid, Sacroiliiac Joint;  Surgeon: Eze Byrd Jr., MD;  Location: [facility];  Service: Pain Management;  Laterality: Bilateral;  Bilateral Sacroiliac Joint Steroid Injections    [id number]    Arrive @ 1145; Trigger point injections also?    EPIDURAL STEROID INJECTION Right  2023    Procedure: Right L5 Transforaminal Epidural Steroid Injection;  Surgeon: Eze Byrd Jr., MD;  Location: [facility] ENDO;  Service: Pain Management;  Laterality: Right;  @0830 (given)  Eliquis last   Plavix last   Check BG    EPIDURAL STEROID INJECTION Right 10/11/2023    Procedure: Right L3 (infraneural) + S1 Transforaminal Epidural Steroid Injections;  Surgeon: Eze Byrd Jr., MD;  Location: [facility] ENDO;  Service: Pain Management;  Laterality: Right;  @0745 (requests morning)  Eliquis 10/7 & Plavix 10/5  Check BG    ESOPHAGOGASTRODUODENOSCOPY N/A 2023    Procedure: EGD (ESOPHAGOGASTRODUODENOSCOPY);  Surgeon: Anita Oswald MD;  Location: [facility] ENDO;  Service: Endoscopy;  Laterality: N/A;  Procedure Timin-4 weeks  Provider: Any GI provider  Location: No Preference  Additional Scheduling Information: Blood thinners  Prep Specifications:N/A   ref. by Dr. Light, instr. to portal, ,  meds-st  ok to hold Plavix 5 days and Eliquis 2 day    INTRAOCULAR PROSTHESES INSERTION Left 2022    Procedure: INSERTION, IOL PROSTHESIS;  Surgeon: Nickolas Hong MD;  Location: [facility] OR;  Service: Ophthalmology;  Laterality: Left;    LAMINOFORAMINOTOMY OF SPINE USING MINIMALLY INVASIVE TECHNIQUE Right 2025    Procedure: LAMINOFORAMINOTOMY, SPINE, MINIMALLY INVASIVE;  Surgeon: Luis M Ellis MD;  Location: [facility] OR;  Service: Neurosurgery;  Laterality: Right;  Right L4/5 minimally invasive laminotomy/microdiscectomy. silvana table, lucio frame, fluoro, microscope, no vendor, no monitoring  CLEARED BY CARDS AND PCP   RN PREOP 24---T/S--done -----NEED ORDERS    PHACOEMULSIFICATION OF CATARACT Left 2022    Procedure: PHACOEMULSIFICATION, CATARACT;  Surgeon: Nickolas Hong MD;  Location: [facility] OR;  Service: Ophthalmology;  Laterality: Left;  RN Phone Pre Op 22.  Covid NEGATIVE  22.  Arrival 08:00 am.    TONSILLECTOMY         Review of  patient's allergies indicates:   Allergen Reactions    Penicillins Other (See Comments)     Unknown reaction, Had a reaction as a child    Shrimp Itching     Hand Itching       Current Neurological Medications:     Current Facility-Administered Medications on File Prior to Encounter   Medication    cyclopentolate 1% ophthalmic solution 1 drop    ofloxacin 0.3 % ophthalmic solution 1 drop    sodium chloride 0.9% flush 10 mL    triamcinolone acetonide injection 40 mg     Current Outpatient Medications on File Prior to Encounter   Medication Sig    HYDROcodone-acetaminophen (NORCO)  mg per tablet Take 1 tablet by mouth every 4 (four) hours as needed for Pain (7-10/10 pain).    acetaminophen (TYLENOL) 650 MG TbSR Take 650 mg by mouth every 8 (eight) hours.    albuterol (PROVENTIL/VENTOLIN HFA) 90 mcg/actuation inhaler Inhale 2 puffs into the lungs every 4 (four) hours as needed for Shortness of Breath. Rescue    [START ON 1/11/2025] apixaban (ELIQUIS) 5 mg Tab Take 1 tablet (5 mg total) by mouth 2 (two) times daily.    ascorbic acid, vitamin C, (VITAMIN C) 100 MG tablet Take 100 mg by mouth daily as needed.    atorvastatin (LIPITOR) 80 MG tablet TAKE 1 TABLET EVERY EVENING    b complex vitamins tablet Take 1 tablet by mouth once daily.    blood-glucose meter kit Test glucose 2-3x/day. True metrix    clopidogreL (PLAVIX) 75 mg tablet Take 75 mg by mouth once daily.    [START ON 1/11/2025] coenzyme Q10 100 mg capsule Take 1 capsule (100 mg total) by mouth once daily.    dapagliflozin propanediol (FARXIGA) 5 mg Tab tablet Take 1 tablet (5 mg total) by mouth once daily.    ergocalciferol (ERGOCALCIFEROL) 50,000 unit Cap TAKE 1 CAPSULE BY MOUTH WEEKLY    fenofibrate 160 MG Tab TAKE 1 TABLET EVERY MORNING    fluticasone propionate (FLONASE) 50 mcg/actuation nasal spray 2 sprays (100 mcg total) by Each Nostril route 2 (two) times daily.    furosemide (LASIX) 20 MG tablet TAKE 1 TABLET TWICE DAILY    gabapentin  "(NEURONTIN) 300 MG capsule Take 2 capsules (600 mg total) by mouth 2 (two) times daily.    glimepiride (AMARYL) 1 MG tablet TAKE 1 TABLET BEFORE BREAKFAST    hydrALAZINE (APRESOLINE) 50 MG tablet TAKE 1 TABLET TWICE DAILY    insulin degludec (TRESIBA FLEXTOUCH U-100) 100 unit/mL (3 mL) insulin pen Inject 20 Units into the skin once daily.    isosorbide mononitrate (IMDUR) 120 MG 24 hr tablet Take 1 tablet (120 mg total) by mouth once daily.    lancets Misc Check blood glucose 2x/day. True metrix. E11.65    linaCLOtide (LINZESS) 145 mcg Cap capsule Take 1 capsule (145 mcg total) by mouth before breakfast.    meclizine (ANTIVERT) 25 mg tablet Take 1 tablet (25 mg total) by mouth 3 (three) times daily as needed for Dizziness (or at least one HS for 1 week when vertigo active).    metFORMIN (GLUCOPHAGE-XR) 500 MG ER 24hr tablet Take 1 tablet with breakfast and 2 tablets with supper.    metFORMIN (GLUCOPHAGE-XR) 500 MG ER 24hr tablet Take 1 tablet with breakfast and 2 tablets with supper.    methocarbamoL (ROBAXIN) 750 MG Tab Take 1 tablet (750 mg total) by mouth 3 (three) times daily as needed (muscle spasms).    nitroGLYCERIN (NITROSTAT) 0.4 MG SL tablet Place 1 tablet (0.4 mg total) under the tongue every 5 (five) minutes as needed for Chest pain.    [START ON 1/11/2025] omega-3 acid ethyl esters (LOVAZA) 1 gram capsule Take 2 capsules (2 g total) by mouth 2 (two) times daily.    ondansetron (ZOFRAN-ODT) 4 MG TbDL Dissolvxe 1 tablet (4 mg total) by mouth every 6 (six) hours as needed (nausea).    pantoprazole (PROTONIX) 40 MG tablet TAKE 1 TABLET EVERY DAY    pen needle, diabetic 32 gauge x 1/4" Ndle Use daily    semaglutide (OZEMPIC) 2 mg/dose (8 mg/3 mL) PnIj Inject 2 mg into the skin every 7 days.    triazolam (HALCION) 0.25 MG Tab TAKE 1 TABLET BY MOUTH EVERY NIGHT AT BEDTIME AS NEEDED     Family History       Problem Relation (Age of Onset)    Blindness Brother    Depression Mother    Diabetes Sister    Heart " disease Mother    No Known Problems Sister, Sister, Maternal Aunt, Maternal Uncle, Paternal Aunt, Paternal Uncle, Maternal Grandmother, Maternal Grandfather, Paternal Grandmother, Paternal Grandfather    Stroke Father          Tobacco Use    Smoking status: Never     Passive exposure: Never    Smokeless tobacco: Never   Substance and Sexual Activity    Alcohol use: Not Currently    Drug use: No    Sexual activity: Yes     Partners: Female     Review of Systems   Constitutional:  Negative for chills and fever.   Respiratory:  Negative for shortness of breath.    Cardiovascular:  Negative for chest pain.   Neurological:  Negative for seizures, facial asymmetry, weakness and numbness.   Psychiatric/Behavioral:  Positive for confusion. Negative for agitation.      Objective:     Vital Signs (Most Recent):  Temp: 98.3 °F (36.8 °C) (01/10/25 1047)  Pulse: (!) 51 (01/10/25 0722)  Resp: (!) 22 (01/10/25 0618)  BP: (!) 199/83 (01/10/25 0722)  SpO2: 97 % (01/10/25 0722) Vital Signs (24h Range):  Temp:  [97.7 °F (36.5 °C)-98.6 °F (37 °C)] 98.3 °F (36.8 °C)  Pulse:  [47-61] 51  Resp:  [15-22] 22  SpO2:  [92 %-99 %] 97 %  BP: (136-205)/() 199/83     Weight: 79.4 kg (175 lb)  Body mass index is 27.41 kg/m².     Physical Exam  HENT:      Head: Normocephalic.   Eyes:      Extraocular Movements: Extraocular movements intact and EOM normal.   Pulmonary:      Effort: No respiratory distress.   Neurological:      Mental Status: He is alert.   Psychiatric:         Speech: Speech normal.          NEUROLOGICAL EXAMINATION:     MENTAL STATUS   Oriented to person.   Disoriented to month. Oriented to year.   Attention: decreased. Concentration: decreased.   Speech: speech is normal   Level of consciousness: alert  Normal comprehension.     CRANIAL NERVES     CN III, IV, VI   Extraocular motions are normal.   Nystagmus: none   Ophthalmoparesis: none    CN VII   Facial expression full, symmetric.     MOTOR EXAM        No new focal motor  "deficits       SENSORY EXAM        No new focal sensory deficits       GAIT AND COORDINATION     Tremor   Resting tremor: absent      Significant Labs: Hemoglobin A1c:   Recent Labs   Lab 12/30/24  1200   HGBA1C 6.3*     Blood Culture: No results for input(s): "LABBLOO" in the last 48 hours.  BMP:   Recent Labs   Lab 01/09/25  1113 01/10/25  0328   GLU 76 76    140   K 4.6 4.5    111*   CO2 25 20*   BUN 28* 24*   CREATININE 1.5* 1.1   CALCIUM 9.0 9.4   MG 1.8 1.7     CBC:   Recent Labs   Lab 01/09/25 1113 01/10/25  0328   WBC 8.80 7.76   HGB 12.7* 13.4*   HCT 41.3 42.8    210     CMP:   Recent Labs   Lab 01/09/25  1113 01/10/25  0328   GLU 76 76    140   K 4.6 4.5    111*   CO2 25 20*   BUN 28* 24*   CREATININE 1.5* 1.1   CALCIUM 9.0 9.4   MG 1.8 1.7   PROT 6.8 6.4   ALBUMIN 3.4* 3.3*   BILITOT 0.7 0.8   ALKPHOS 27* 26*   AST 19 16   ALT 14 14   ANIONGAP 8 9     CSF Culture: No results for input(s): "CSFCULTURE" in the last 48 hours.  CSF Studies: No results for input(s): "ALIQUT", "APPEARCSF", "COLORCSF", "CSFWBC", "CSFRBC", "GLUCCSF", "LDHCSF", "PROTEINCSF", "VDRLCSF" in the last 48 hours.  Inflammatory Markers: No results for input(s): "SEDRATE", "CRP", "PROCAL" in the last 48 hours.  POCT Glucose: No results for input(s): "POCTGLUCOSE" in the last 24 hours.  Prealbumin: No results for input(s): "PREALBUMIN" in the last 48 hours.  Respiratory Culture: No results for input(s): "GSRESP", "RESPIRATORYC" in the last 48 hours.  Urine Culture: No results for input(s): "LABURIN" in the last 48 hours.  Urine Studies:   Recent Labs   Lab 01/09/25  1619   COLORU Yellow   APPEARANCEUA Clear   PHUR >8.0*   SPECGRAV 1.015   PROTEINUA 1+*   GLUCUA 2+*   KETONESU Negative   BILIRUBINUA Negative   OCCULTUA Negative   NITRITE Negative   UROBILINOGEN 1.0   LEUKOCYTESUR Negative   RBCUA 5*   WBCUA 4   BACTERIA Occasional   HYALINECASTS 2*     Recent Lab Results         01/10/25  0328   01/09/25  1619 "   01/09/25  1113        Benzodiazepines   Negative         Methadone metabolites   Negative         Phencyclidine   Negative         Albumin 3.3     3.4       Alcohol, Serum     <10       ALP 26     27       ALT 14     14       Ammonia     27       Amphetamines, Urine   Negative         Anion Gap 9     8       Appearance, UA   Clear         AST 16     19       Bacteria, UA   Occasional         Barbituates, Urine   Negative         Baso # 0.01     0.01       Basophil % 0.1     0.1       Bilirubin (UA)   Negative         BILIRUBIN TOTAL 0.8  Comment: For infants and newborns, interpretation of results should be based  on gestational age, weight and in agreement with clinical  observations.    Premature Infant recommended reference ranges:  Up to 24 hours.............<8.0 mg/dL  Up to 48 hours............<12.0 mg/dL  3-5 days..................<15.0 mg/dL  6-29 days.................<15.0 mg/dL       0.7  Comment: For infants and newborns, interpretation of results should be based  on gestational age, weight and in agreement with clinical  observations.    Premature Infant recommended reference ranges:  Up to 24 hours.............<8.0 mg/dL  Up to 48 hours............<12.0 mg/dL  3-5 days..................<15.0 mg/dL  6-29 days.................<15.0 mg/dL         BUN 24     28       Calcium 9.4     9.0       Chloride 111     108       CO2 20     25       Cocaine, Urine   Negative         Color, UA   Yellow         Creatinine 1.1     1.5       Urine Creatinine   91.8         Differential Method Automated     Automated       eGFR >60     48       Eos # 0.5     0.5       Eos % 6.2     5.8       Free T4 1.12           Glucose 76     76       Glucose, UA   2+         Gran # (ANC) 4.9     5.9       Gran % 63.1     66.5       Hematocrit 42.8     41.3       Hemoglobin 13.4     12.7       Hyaline Casts, UA   2         Immature Grans (Abs) 0.02  Comment: Mild elevation in immature granulocytes is non specific and   can be seen in  a variety of conditions including stress response,   acute inflammation, trauma and pregnancy. Correlation with other   laboratory and clinical findings is essential.       0.03  Comment: Mild elevation in immature granulocytes is non specific and   can be seen in a variety of conditions including stress response,   acute inflammation, trauma and pregnancy. Correlation with other   laboratory and clinical findings is essential.         Immature Granulocytes 0.3     0.3       INR     1.1  Comment: Coumadin Therapy:  2.0 - 3.0 for INR for all indicators except mechanical heart valves  and antiphospholipid syndromes which should use 2.5 - 3.5.         Ketones, UA   Negative         Leukocyte Esterase, UA   Negative         Lymph # 1.7     1.7       Lymph % 22.3     19.8       Magnesium  1.7     1.8       MCH 26.5     25.9       MCHC 31.3     30.8       MCV 85     84       Microscopic Comment   SEE COMMENT  Comment: Other formed elements not mentioned in the report are not   present in the microscopic examination.            Mono # 0.6     0.7       Mono % 8.0     7.5       MPV 9.7     10.1       NITRITE UA   Negative         nRBC 0     0       Blood, UA   Negative         Opiates, Urine   Presumptive Positive         pH, UA   >8.0         Phosphorus Level 3.6           Platelet Count 210     207       Potassium 4.5     4.6  Comment: Specimen slightly hemolyzed       PROTEIN TOTAL 6.4     6.8       Protein, UA   1+  Comment: Recommend a 24 hour urine protein or a urine   protein/creatinine ratio if globulin induced proteinuria is  clinically suspected.           PT     12.3       RBC 5.06     4.91       RBC, UA   5         RDW 15.2     15.5       Sodium 140     141       Spec Grav UA   1.015         Specimen UA   Urine, Clean Catch         Marijuana (THC) Metabolite   Negative         Toxicology Information   SEE COMMENT  Comment: This screen includes the following classes of drugs at the listed    cut-off:    Benzodiazepines 200 ng/ml  Methadone 300 ng/ml  Cocaine metabolite 300 ng/ml  Opiates 300 ng/ml  Barbiturates 200 ng/ml  Amphetamines 1000 ng/ml  Marijuana metabs (THC) 50 ng/ml  Phencyclidine (PCP) 25 ng/ml    This is a screening test. If results do not correlate with clinical   presentation, then a confirmatory send out test is advised.     This report is intended for use in clinical monitoring and management   of   patients. It is not intended for use in employment related drug   testing.           TSH 8.716           UROBILINOGEN UA   1.0         WBC, UA   4         WBC 7.76     8.80             All pertinent lab results from the past 24 hours have been reviewed.    Significant Imaging: I have reviewed and interpreted all pertinent imaging results/findings within the past 24 hours.

## 2025-01-10 NOTE — ASSESSMENT & PLAN NOTE
Patient's blood pressure range in the last 24 hours was: BP  Min: 136/65  Max: 205/91.The patient's inpatient anti-hypertensive regimen is listed below:  Current Antihypertensives  nitroGLYCERIN SL tablet 0.4 mg, Every 5 min PRN, Sublingual  hydrALAZINE injection 10 mg, Every 6 hours PRN, Intravenous  hydrALAZINE tablet 50 mg, 2 times daily, Oral    Plan  - BP is controlled, no changes needed to their regimen  - continue home hydralazine

## 2025-01-10 NOTE — PLAN OF CARE
Case Management Assessment     PCP: Jono Willoughby  [facility]    Patient Arrived From: home   Existing Help at Home: spouse    Barriers to Discharge: none    Discharge Plan:    A. Home Health   B. Home with family    PT/OT recommended Home Health anf rolling walker.     01/10/25 1518   Discharge Planning   Assessment Type Discharge Planning Brief Assessment   Resource/Environmental Concerns none   Support Systems Spouse/significant other   Equipment Currently Used at Home none   Current Living Arrangements home   Patient/Family Anticipates Transition to home with family   Patient/Family Anticipated Services at Transition none   DME Needed Upon Discharge  none   Discharge Plan A Home with family   Discharge Plan B Home with family

## 2025-01-10 NOTE — PROGRESS NOTES
[facility] Emergency Dept  Hospital Medicine  Progress Note    Patient Name: Driss Hernandez Jr.  MRN: [id number]  Patient Class: OP- Observation   Admission Date: 1/9/2025  Length of Stay: 0 days  Attending Physician: Keyur Mello MD  Primary Care Provider: Jono Willoughby MD        Subjective     Principal Problem:Encephalopathy        HPI:  Driss Hernandez Jr. is a 74 y.o. male with  Several spinal and balance issues, Chronic pain on oxycodone, Non-Insulin-dependent DM2 with neuropathic complications, CAD s/p CABG, HTN, HLD, AFib on Eliquis, and CKD 3  who presented to [facility] ED on 01/09/2025 for eval and treatment of a fall and near syncopal event.  Per EMS, pt was at home using the bathroom when he felt light-headed and had a syncopal episode. He was found by a family member on the ground and has been confused since the fall. Reports pt was hypertensive with /100 but vitals otherwise normal. Pt reports he sustained a small wound on his abdomen. He states he feels a little confused, but denies any headache, nausea, vomiting, CP, SOB, leg pain or numbness, neck pain, dysuria, visual disturbances, speech difficulty, hearing loss, cough, eye pain, sore throat, rhinorrhea, fever or other associated symptoms. He does report some back pain at his surgical site.    ED course notable for the following: Confused, and kept trying to stand up to leave.  Hypertensive and bradycardic to the 40s.  Exam with abrasion on forehead.  Labs BUN 28 Crt 1.5 eGFR 48 UA w hyaline casts.  EKG w bradycardia and AFib w slow ventricular response.  Imaging: CTH without acute intracranial abnormality.  ED therapy/stabilization measures: pain control.  They were placed in observation under the care of [facility] for further evaluation and treatment.        Overview/Hospital Course:  Driss Hernandez Jr. 74 y.o. male placed in observation for fall at home and associated altered mental  status.  He has been after a fall versus near syncopal episode that has unwitnessed by his family.  In the ED his CTA was without acute intracranial abnormality his CT lumbar spine and no evidence of osseous traumatic injury.  He had difficulty ambulating.  His urine drug screen was positive for opiates previously prescribed per .    Interval History: feels well, unsure of the events that brought him to the hospital.     Review of Systems   Unable to perform ROS: Mental status change     Objective:     Vital Signs (Most Recent):  Temp: 97.8 °F (36.6 °C) (01/10/25 0745)  Pulse: (!) 51 (01/10/25 0722)  Resp: (!) 22 (01/10/25 0618)  BP: (!) 199/83 (01/10/25 0722)  SpO2: 97 % (01/10/25 0722) Vital Signs (24h Range):  Temp:  [97.5 °F (36.4 °C)-98.6 °F (37 °C)] 97.8 °F (36.6 °C)  Pulse:  [47-61] 51  Resp:  [15-22] 22  SpO2:  [92 %-99 %] 97 %  BP: (136-205)/() 199/83     Weight: 79.4 kg (175 lb)  Body mass index is 27.41 kg/m².    Intake/Output Summary (Last 24 hours) at 1/10/2025 0903  Last data filed at 1/10/2025 0622  Gross per 24 hour   Intake 2000 ml   Output 200 ml   Net 1800 ml         Physical Exam  Vitals and nursing note reviewed.   Constitutional:       General: He is not in acute distress.     Appearance: He is well-developed. He is not diaphoretic.   HENT:      Head: Normocephalic and atraumatic.      Right Ear: External ear normal.      Left Ear: External ear normal.   Eyes:      General:         Right eye: No discharge.         Left eye: No discharge.      Conjunctiva/sclera: Conjunctivae normal.   Neck:      Thyroid: No thyromegaly.   Cardiovascular:      Rate and Rhythm: Normal rate and regular rhythm.      Heart sounds: No murmur heard.  Pulmonary:      Effort: Pulmonary effort is normal. No respiratory distress.      Breath sounds: Normal breath sounds.   Abdominal:      General: Bowel sounds are normal. There is no distension.      Palpations: Abdomen is soft. There is no mass.      Tenderness:  There is no abdominal tenderness.   Musculoskeletal:         General: No deformity.      Cervical back: Normal range of motion and neck supple.   Skin:     General: Skin is warm and dry.   Neurological:      Mental Status: He is alert.      Sensory: No sensory deficit.      Comments: Symmetric movement of BUE and BLE against gravity no facial droop or slurred speech. Oriented to wife, self, birthday. Not oriented to year or place.    Psychiatric:         Behavior: Behavior normal.             Significant Labs: CBC:   Recent Labs   Lab 01/09/25  1113 01/10/25  0328   WBC 8.80 7.76   HGB 12.7* 13.4*   HCT 41.3 42.8    210     CMP:   Recent Labs   Lab 01/09/25  1113 01/10/25  0328    140   K 4.6 4.5    111*   CO2 25 20*   GLU 76 76   BUN 28* 24*   CREATININE 1.5* 1.1   CALCIUM 9.0 9.4   PROT 6.8 6.4   ALBUMIN 3.4* 3.3*   BILITOT 0.7 0.8   ALKPHOS 27* 26*   AST 19 16   ALT 14 14   ANIONGAP 8 9       Significant Imaging:   Imaging Results              CT Lumbar Spine Without Contrast (Final result)  Result time 01/09/25 16:33:02      Final result by Manas Sorenson MD (01/09/25 16:33:02)                   Impression:      No CT evidence of lumbar spine acute osseous traumatic injury.    Lumbar spondylosis most prominent at L2-3, L4-5 and L5-S1 levels, as further outlined in the body of the report.    Other incidental/nonemergent findings in the body of the report.      Electronically signed by: Manas Sorenson MD  Date:    01/09/2025  Time:    16:33               Narrative:    EXAMINATION:  CT LUMBAR SPINE WITHOUT CONTRAST    CLINICAL HISTORY:  Low back pain, trauma;    TECHNIQUE:  Low-dose axial, sagittal and coronal reformations are obtained through the lumbar spine.  Contrast was not administered.    COMPARISON:  Lumbar spine series 12/12/2024, MRI lumbar spine 07/17/2023, CT abdomen and pelvis 06/24/2023    FINDINGS:  Five non-rib-bearing lumbar type vertebral bodies.  Mild straightening of the usual  lumbar lordosis.  Similar chronic minimal anterior wedge deformity of L1 vertebral body.  No evidence for acute vertebral compression fracture.  No displaced fracture, dislocation or significant listhesis.  Grossly stable nonspecific subcentimeter lucent lesion at the right iliac body near the SI joint.  No destructive osseous process.  No pars defect.  Multilevel degenerative disc disease with loss of disc height, endplate changes, marginal osteophytes with posterior disc osteophyte complexes and facet arthrosis most prominent at L4-5 level, not significantly progressed from most previous imaging.  Suspected multilevel small Schmorl's nodes, similar to prior.  No significant prevertebral soft tissue thickening.  No paraspinal mass or fluid collection.  No subcutaneous emphysema or radiopaque foreign body.    L1-2: Minimal bilateral neural foraminal narrowing, more so on the right.  No significant spinal canal stenosis.    L2-3: Posterior broad-based disc bulge resulting in mild acquired canal stenosis.  Mild bilateral neural foraminal narrowing, right more so than left.    L3-4: Posterior broad-based disc bulge resulting in minimal acquired canal stenosis.  Mild right and minimal left neural foraminal narrowing.    L4-5: Posterior disc osteophyte complex resulting in at least moderate acquired canal stenosis.  Moderate bilateral neural foraminal narrowing, left more so than right.    L5-S1: Posterior disc osteophyte complex asymmetric to the right resulting in minimal acquired canal stenosis.  Moderate to severe bilateral foraminal narrowing, left more so than right.    Scattered atherosclerotic vascular calcifications.  No acute abnormality seen within the imaged abdomen or pelvis.  Age-related mild to moderate degenerative change at the bilateral SI joints.                                       CT Head Without Contrast (Final result)  Result time 01/09/25 12:09:59      Final result by Rivera Raza MD  (01/09/25 12:09:59)                   Impression:      1. No definite acute intracranial findings by noncontrast CT.  2. Partially imaged soft tissue lesions within the superficial lobes of the bilateral parotid glands, with differential considerations to include intraparotid lymph nodes versus benign or malignant primary parotid neoplasms.  Further characterization with ultrasound and possible tissue sampling would be recommended.      Electronically signed by: Rivera Raza  Date:    01/09/2025  Time:    12:09               Narrative:    EXAMINATION:  CT HEAD WITHOUT CONTRAST    CLINICAL HISTORY:  Mental status change, unknown cause; Altered mental status, unspecified    TECHNIQUE:  Low dose axial images were obtained through the head.  Coronal and sagittal reformations were also performed. Contrast was not administered.    COMPARISON:  MRI of the brain performed 12/20/2024.    FINDINGS:  Blood: No acute intracranial hemorrhage.    Parenchyma: No definite loss of gray-white differentiation to suggest acute or subacute transcortical infarct. Generalized pattern of age-related parenchymal volume loss.  Nonspecific areas of white matter hypoattenuation may reflect sequela of chronic small vessel ischemic disease.    Ventricles/Extra-axial spaces: No abnormal extra-axial fluid collection. Basal cisterns are patent.    Vessels: Moderate atherosclerotic calcifications.    Orbits: Status post bilateral lens replacements.    Scalp: Unremarkable.    Skull: There are no depressed skull fractures or destructive bone lesions.  Chronic appearing nasal bone deformity.    Sinuses and mastoids: Relatively minimal paranasal sinus mucosal thickening with small retention cysts.    Other findings: Partially imaged ovoid soft tissue lesions in the superficial lobes of bilateral parotid glands, measuring up to 11 mm on the right and 12 mm on the left (for example as seen on series 3, image 1).                                         Assessment and Plan     * Encephalopathy  Presented after a fall vs near syncope at home. Oriented to most topics of conversation, but disoriented to specifics and not baseline mental status per wife. CT head without abnormality. Unclear as to etiology, possibly polypharmacy due to opiates, robaxin, halcion, gabapentin. Or head trauma with fall and post concussive?  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 Suspect  to AMS and confusion. BP medications stopped to allow permissive HTN. Continue eliquis, not a candidate for TNK as last known normal yesterday. Night hospitalitis aware.     Bradycardia  Per outpatient cardiology notes history of bradycardia. Not currently on BB outpt      Fall at home  Impaired mobility  Posture imbalance  Bradycardia  AFib  HTN  Insomnia    Suspect polypharmacy.    Place in Obs  Neuro checks q4h  Holding several of his many medications, including Eliquis 5 for 24h in setting of head trauma, Imdur, Halcion  PT OT        Impaired mobility and ADLs  PT/oT consulted      Longstanding persistent atrial fibrillation  Patient has paroxysmal (<7 days) atrial fibrillation. [id number] Score: 4. The patients heart rate in the last 24 hours is as follows:  Pulse  Min: 47  Max: 61     Antiarrhythmics       Anticoagulants       Plan  - Replete lytes with a goal of K>4, Mg >2  - Patient is anticoagulated, see medications listed above.  - Patient's afib is currently controlled  - resume eliquis, not on BB history of previous bradycardia per outpt cardio notes        Hx of CABG  HLD    Continue home statin      Posture imbalance  PT/OT consulted      Essential hypertension  Patient's blood pressure range in the last 24 hours was: BP  Min: 136/65  Max: 205/91.The patient's  "inpatient anti-hypertensive regimen is listed below:  Current Antihypertensives  nitroGLYCERIN SL tablet 0.4 mg, Every 5 min PRN, Sublingual  hydrALAZINE injection 10 mg, Every 6 hours PRN, Intravenous  hydrALAZINE tablet 50 mg, 2 times daily, Oral    Plan  - BP is controlled, no changes needed to their regimen  - continue home hydralazine    Mixed hyperlipidemia  Continue statin    Gastroesophageal reflux disease  Continue protonix    Type 2 diabetes mellitus with peripheral neuropathy  Last A1c reviewed-   Lab Results   Component Value Date    HGBA1C 6.3 (H) 12/30/2024     Home antihyperglycemic regimen: metformin, Ozempic, amaryl    No results for input(s): "POCTGLUCOSE" in the last 72 hours.  Most recent inpatient antihyperglycemic regimen:   Antihyperglycemics (From admission, onward)      Start     Stop Route Frequency Ordered    01/10/25 0124  insulin aspart U-100 pen 0-5 Units         -- SubQ Every 4 hours PRN 01/10/25 0024          Goal blood glucose range while admitted: 140-180 per the NICE-SUGAR trial and American Diabetes Association guidelines  Diabetic diet 2000 jim  Rechecking A1c if most recent result above > 6 months ago  Diabetic counseling and education (eg nutrition, insulin) to be given prior to discharge              VTE Risk Mitigation (From admission, onward)           Ordered     Reason for No Pharmacological VTE Prophylaxis  Once        Question:  Reasons:  Answer:  Risk of Bleeding    01/09/25 1911     IP VTE HIGH RISK PATIENT  Once         01/09/25 1911     Place sequential compression device  Until discontinued         01/09/25 1911                    Discharge Planning   SCOTT:  1/11    Code Status: Full Code   Medical Readiness for Discharge Date:              Please place Justification for DME        Biju Talavera PA-C  Department of Hospital Medicine   [facility] - Emergency Dept    "

## 2025-01-10 NOTE — PROCEDURES
EEG Extended Monitoring up to one hour    Date/Time: 1/9/2025 10:43 AM    Performed by: Alvaro Escoto MD  Authorized by: Arjun Franco MD      ELECTROENCEPHALOGRAM REPORT    DATE OF SERVICE: 01/10/2025  EEG NUMBER: OW   LOCATION OF SERVICE: Neurology    METHODOLOGY   Electroencephalographic (EEG) recording is with electrodes placed according to the International 10-20 placement system.  Thirty two (32) channels of digital signal (sampling rate of 512/sec) including T1 and T2 was simultaneously recorded from the scalp and may include  EKG, EMG, and/or eye monitors.  Recording band pass was 0.1 to 512 hz.  Digital video recording of the patient is simultaneously recorded with the EEG.  The patient is instructed report clinical symptoms which may occur during the recording session.  EEG and video recording is stored and archived in digital format. Activation procedures which include photic stimulation, hyperventilation and instructing patients to perform simple task are done in selected patients.    The EEG is displayed on a monitor screen and can be reviewed using different montages.  Computer assisted analysis is employed to detect spike and electrographic seizure activity.   The entire record is submitted for computer analysis.  The entire recording is visually reviewed and the times identified by computer analysis as being spikes or seizures are reviewed again.  Compresses spectral analysis (CSA) is also performed on the activity recorded from each individual channel.  This is displayed as a power display of frequencies from 0 to 30 Hz over time.   The CSA is reviewed looking for asymmetries in power between homologous areas of the scalp and then compared with the original EEG recording.     iSentium software was also utilized in the review of this study.  This software suite analyzes the EEG recording in multiple domains.  Coherence and rhythmicity is computed to identify EEG sections which may  contain organized seizures.  Each channel undergoes analysis to detect presence of spike and sharp waves which have special and morphological characteristic of epileptic activity.  The routine EEG recording is converted from spacial into frequency domain.  This is then displayed comparing homologous areas to identify areas of significant asymmetry.  Algorithm to identify non-cortically generated artifact is used to separate eye movement, EMG and other artifact from the EEG    EEG FINDINGS  During the maximally alert state, a 6-7 Hz microvolt posterior dominant rhythm was seen which was symmetrical, well-regulated and briskly attenuated to eye opening. In the more anterior head regions, symmetric frontocentral beta frequencies predominated. Sleep was not captured during this study.     Intermittent slowing in then form of high amplitude quasi rhythmic 1-2 Hz activity was noted independently in bitemporal region maximal in F7>F3>T1 and T2>T4. (FIRDA) Frontal Intermittent Rhythmic Delta Activity was seen.     No epileptiform findings were noted, no electrographic seizures were seen, and no clinical events were reported.    Activation Procedures were not performed.      IMPRESSION:  Posterior background slowing  Intermittent reactive delta activity, regional, bitemporal    CLINICAL CORRELATION:  The intermittent focal slowing in the bitemporal region is suggestive of structural or functional abnormality in the region. The mild background slowing seen is a nonspecific finding but does suggest a mild degree of encephalopathy. There were no epileptiform findings, electrographic seizures, or no clinical events recorded. Clinical correlation is recommended.      Alvaro Escoto MD  Neurology  [facility]

## 2025-01-10 NOTE — ASSESSMENT & PLAN NOTE
75 yo male with medical history of L4/5 laminectomy; S/p recent MIS Right L4-5 laminectomy; chronic balance issues, chronic pain on oxycodone, DM, CAD s/p CABG, HTN, HLD, AFib on Eliquis, and CKD 3  - with admission concerns of encephalopathy. And neurology has been consulted for the same.     History from patient / family and medical records review. Patient was found slided down on the side in the bathroom - with wife reporting patient was noted to be confused / starred off / with delay in responses. No obvious evidence of clinical seizures / loss of bowel or bladder continence / tongue biting - had bruising and bleeding from the arm. Background includes recent surgery / pain medication management - however no clear report of any pain related vagal event. No report of any preceding diaphoresis / felling pale / generalized weakness. No clear emotional / situational / orthostatic trigger. No associated cardiogenic triggers. Gradual improvement in symptoms over the time and level of awareness.. No focal motor or sensory or cranial nerve deficits. No history of primary autonomic failure / cardiac arrhyhtmia pathology. No history of stroke / seizures / complex migraines. No similar event  in the past.     Investigations - mild MADDIE on admission. Afib on AC. CT head negative for any acute findings.   Exam awake alert, oriented to person / family /  / current affairs / not to month but year / no focal motor or sensory or cranial nerve deficits    Recs:   Suspect symptomatology in the spectrum of any atypical vagal event at the background of pain with confounders of medication related vs metabolic triggers of mild encephalopathy + undiagnosed spectrum of cognitive dysfunction. Low suspicion for cerebrovascular / epileptic etiology - however needs further evaluation.     MRI brain wo contrast  MRA brain wo contrast   MRA neck wo contrast   EEG routine / 1hr   - If investigations unrevealing can be followed up on OP basis      Avoid hypotension / maintain perfusion - hydration / long term goals < 140/90     Check ortho stats / PT-OT assistance on the same / correlate with any triggers related to home medications and modify accordingly if positive / modify daily activities and lifestyle as needed / prn use compression stockings and abdominal binders / maximize on non pharmacological measures    Continue home AC / control cardiogenic triggers    Cognitive evaluation on OP basis     -- Correct lytes as needed / control infection if any / avoid hypoxia or hypercapnia / avoid hypoglycemia   -- Correct any nutritional deficiencies / correct anemia / provide nutritional support as appropriate / ambulate when appropriate   -- PT/OT recs / gait assistance and fall precautions    -- delirium precautions

## 2025-01-10 NOTE — PLAN OF CARE
Problem: Occupational Therapy  Goal: Occupational Therapy Goal  Description: Goals to be met by: 1/24/2025     Patient will increase functional independence with ADLs by performing:    UE Dressing with Modified [place].  LE Dressing with Modified [place].  Grooming while standing at sink with Modified [place].  Toileting from toilet with Modified [place] for hygiene and clothing management.   Step transfer with Modified [place]  Toilet transfer to toilet with Modified [place].  100% reciprocation and integration of 3/3 spinal precautions, DME recommendations, and ADL/task modifications post-sx to support safe d/c at highest level of function.      Outcome: Progressing     OT initial eval completed. Pt presenting with increased pain, need of assist, and elevated BP from baseline and would benefit from skilled OT to maximize function prior to d/c. Recommend LIT once medically stable and RW.

## 2025-01-10 NOTE — HPI
73 yo male with medical history of L4/5 laminectomy; S/p recent MIS Right L4-5 laminectomy; chronic balance issues, chronic pain on oxycodone, DM, CAD s/p CABG, HTN, HLD, AFib on Eliquis, and CKD 3  - with admission concerns of encephalopathy. And neurology has been consulted for the same.     History from patient / family and medical records review. Patient was found slided down on the side in the bathroom - with wife reporting patient was noted to be confused / starred off / with delay in responses. No obvious evidence of clinical seizures / loss of bowel or bladder continence / tongue biting - had bruising and bleeding from the arm. Background includes recent surgery / pain medication management - however no clear report of any pain related vagal event. No report of any preceding diaphoresis / felling pale / generalized weakness. No clear emotional / situational / orthostatic trigger. No associated cardiogenic triggers. Gradual improvement in symptoms over the time and level of awareness.. No focal motor or sensory or cranial nerve deficits. No history of primary autonomic failure / cardiac arrhyhtmia pathology. No history of stroke / seizures / complex migraines. No similar event  in the past.     Investigations - mild MADDIE on admission. Afib on AC. CT head negative for any acute findings.   Exam awake alert, oriented to person / family /  / current affairs / not to month but year / no focal motor or sensory or cranial nerve deficits

## 2025-01-10 NOTE — SUBJECTIVE & OBJECTIVE
Past Medical History:   Diagnosis Date    Chronic heart failure with preserved ejection fraction 2/9/2023    Chronic midline low back pain without sciatica 10/2/2017    Colon polyps     Coronary artery disease involving native coronary artery of native heart 3/5/2020    Coronary artery disease involving native coronary artery of native heart with angina pectoris 12/10/2020    Diabetes mellitus with neurological manifestations, uncontrolled 1/24/2017    Diabetic polyneuropathy associated with type 2 diabetes mellitus 1/24/2017    Diabetic polyneuropathy associated with type 2 diabetes mellitus     Essential hypertension 1/24/2017    Gastroesophageal reflux disease 1/24/2017    Hyperlipidemia 1/24/2017    Insomnia 1/24/2017    Long-term insulin use 1/24/2017    Longstanding persistent atrial fibrillation 12/30/2020    Lumbar spondylosis 11/13/2017    Nuclear sclerosis of both eyes 8/24/2017    Obesity     PAD (peripheral artery disease) 3/23/2022    Stage 3 chronic kidney disease 2/13/2019    Uncontrolled type 2 diabetes mellitus without complication, with long-term current use of insulin 1/24/2017       Past Surgical History:   Procedure Laterality Date    ARTHROSCOPIC REPAIR OF ROTATOR CUFF OF SHOULDER Right 7/5/2022    Procedure: REPAIR, ROTATOR CUFF, ARTHROSCOPIC;  Surgeon: Valerie Olivera MD;  Location: [facility];  Service: Orthopedics;  Laterality: Right;  GUADALUPE AND NEPHJENNIFER LEMUS [phone]/ TEXTED ALLAN ON 6/23/2022 @ 9:47AM. ALLAN RESPONDED ON 6/23/2022 @ 10:33AM-LO  JEAN PAUL SALAZARTIER [phone] MY BE HERE FOR CASE SPOKE TO HIM @ 9:59AM ON 7-1-2022  RN PREOP 6/28/2022    BACK SURGERY      2002    CATARACT EXTRACTION W/  INTRAOCULAR LENS IMPLANT Right 08/29/2017    Dr. Hong    CATARACT EXTRACTION W/  INTRAOCULAR LENS IMPLANT Left 02/24/2022    Dr. Hong    CAUDAL EPIDURAL STEROID INJECTION N/A 9/25/2024    Procedure: Caudal Epidural Steroid Injection;  Surgeon: Eze Byrd Jr., MD;   Location: [facility] PAIN MANAGEMENT;  Service: Pain Management;  Laterality: N/A;  @1100(prev. 715)  Eliquis last 9/21  Plavix last 9/19  Check BG  E-Consent    COLONOSCOPY N/A 2/21/2017    Procedure: COLONOSCOPY;  Surgeon: Robb Rosado MD;  Location: [facility] ENDO;  Service: Endoscopy;  Laterality: N/A;    COLONOSCOPY N/A 7/5/2023    Procedure: COLONOSCOPY;  Surgeon: Aston Varela MD;  Location: [facility] ENDO;  Service: Endoscopy;  Laterality: N/A;  Referral: JOVANNI SCANLON to hold Plavix 5 days and Eliquis 2 days per Dr Ford   Meds  Suprep   Inst portal   LW    CORONARY ANGIOGRAPHY INCLUDING BYPASS GRAFTS WITH CATHETERIZATION OF LEFT HEART N/A 11/11/2020    Procedure: ANGIOGRAM, CORONARY, INCLUDING BYPASS GRAFT, WITH LEFT HEART CATHETERIZATION;  Surgeon: Lane Cuellar MD;  Location: [facility] CATH LAB;  Service: Cardiology;  Laterality: N/A;  RN PRE OP 11-5-2020. --COVID NEGATIVE ON  11-. C A    CORONARY ARTERY BYPASS GRAFT      March 2016    EPIDURAL STEROID INJECTION Bilateral 11/14/2018    Procedure: Lumbar Medial Branch Blocks;  Surgeon: Eze Byrd Jr., MD;  Location: [facility] ENDO;  Service: Pain Management;  Laterality: Bilateral;  Bilateral Lumbar Medial Branch Blocks L2, L3, L4, L5    53204  [id number]    Arrive @ 1150; NO Sedation    EPIDURAL STEROID INJECTION Bilateral 11/28/2018    Procedure: Injection, Steroid, Epidural;  Surgeon: Eze Byrd Jr., MD;  Location: [facility];  Service: Pain Management;  Laterality: Bilateral;  Bilateral Sacroiliac Joint Steroid Injections    11089    Arrive @ 0910    EPIDURAL STEROID INJECTION Bilateral 3/20/2019    Procedure: Injection, Steroid, Sacroiliiac Joint;  Surgeon: Eze Byrd Jr., MD;  Location: [facility] ENDO;  Service: Pain Management;  Laterality: Bilateral;  Bilateral Sacroiliac Joint Steroid Injections    64473    Arrive @ 1145; Trigger point injections also?    EPIDURAL STEROID INJECTION Right 8/2/2023    Procedure: Right L5  Transforaminal Epidural Steroid Injection;  Surgeon: Eze Byrd Jr., MD;  Location: [facility] ENDO;  Service: Pain Management;  Laterality: Right;  @0830 (given)  Eliquis last   Plavix last   Check BG    EPIDURAL STEROID INJECTION Right 10/11/2023    Procedure: Right L3 (infraneural) + S1 Transforaminal Epidural Steroid Injections;  Surgeon: Eze Byrd Jr., MD;  Location: [facility] ENDO;  Service: Pain Management;  Laterality: Right;  @0745 (requests morning)  Eliquis 10/7 & Plavix 10/5  Check BG    ESOPHAGOGASTRODUODENOSCOPY N/A 2023    Procedure: EGD (ESOPHAGOGASTRODUODENOSCOPY);  Surgeon: Anita Oswald MD;  Location: [facility] ENDO;  Service: Endoscopy;  Laterality: N/A;  Procedure Timin-4 weeks  Provider: Any GI provider  Location: No Preference  Additional Scheduling Information: Blood thinners  Prep Specifications:N/A   ref. by Dr. Light, instr. to portal, ,  meds-st  ok to hold Plavix 5 days and Eliquis 2 day    INTRAOCULAR PROSTHESES INSERTION Left 2022    Procedure: INSERTION, IOL PROSTHESIS;  Surgeon: Nickolas Hong MD;  Location: [facility] OR;  Service: Ophthalmology;  Laterality: Left;    LAMINOFORAMINOTOMY OF SPINE USING MINIMALLY INVASIVE TECHNIQUE Right 2025    Procedure: LAMINOFORAMINOTOMY, SPINE, MINIMALLY INVASIVE;  Surgeon: Luis M Ellis MD;  Location: [facility] OR;  Service: Neurosurgery;  Laterality: Right;  Right L4/5 minimally invasive laminotomy/microdiscectomy. silvana table, lucio frame, fluoro, microscope, no vendor, no monitoring  CLEARED BY CARDS AND PCP   RN PREOP 24---T/S--done -----NEED ORDERS    PHACOEMULSIFICATION OF CATARACT Left 2022    Procedure: PHACOEMULSIFICATION, CATARACT;  Surgeon: Nickolas Hong MD;  Location: [facility] OR;  Service: Ophthalmology;  Laterality: Left;  RN Phone Pre Op 22.  Covid NEGATIVE  22.  Arrival 08:00 am.    TONSILLECTOMY         Review of patient's allergies indicates:    Allergen Reactions    Penicillins Other (See Comments)     Unknown reaction, Had a reaction as a child    Shrimp Itching     Hand Itching       Current Facility-Administered Medications on File Prior to Encounter   Medication    cyclopentolate 1% ophthalmic solution 1 drop    ofloxacin 0.3 % ophthalmic solution 1 drop    sodium chloride 0.9% flush 10 mL    triamcinolone acetonide injection 40 mg     Current Outpatient Medications on File Prior to Encounter   Medication Sig    HYDROcodone-acetaminophen (NORCO)  mg per tablet Take 1 tablet by mouth every 4 (four) hours as needed for Pain (7-10/10 pain).    acetaminophen (TYLENOL) 650 MG TbSR Take 650 mg by mouth every 8 (eight) hours.    albuterol (PROVENTIL/VENTOLIN HFA) 90 mcg/actuation inhaler Inhale 2 puffs into the lungs every 4 (four) hours as needed for Shortness of Breath. Rescue    [START ON 1/11/2025] apixaban (ELIQUIS) 5 mg Tab Take 1 tablet (5 mg total) by mouth 2 (two) times daily.    ascorbic acid, vitamin C, (VITAMIN C) 100 MG tablet Take 100 mg by mouth daily as needed.    atorvastatin (LIPITOR) 80 MG tablet TAKE 1 TABLET EVERY EVENING    b complex vitamins tablet Take 1 tablet by mouth once daily.    blood-glucose meter kit Test glucose 2-3x/day. True metrix    clopidogreL (PLAVIX) 75 mg tablet Take 75 mg by mouth once daily.    [START ON 1/11/2025] coenzyme Q10 100 mg capsule Take 1 capsule (100 mg total) by mouth once daily.    dapagliflozin propanediol (FARXIGA) 5 mg Tab tablet Take 1 tablet (5 mg total) by mouth once daily.    ergocalciferol (ERGOCALCIFEROL) 50,000 unit Cap TAKE 1 CAPSULE BY MOUTH WEEKLY    fenofibrate 160 MG Tab TAKE 1 TABLET EVERY MORNING    fluticasone propionate (FLONASE) 50 mcg/actuation nasal spray 2 sprays (100 mcg total) by Each Nostril route 2 (two) times daily.    furosemide (LASIX) 20 MG tablet TAKE 1 TABLET TWICE DAILY    gabapentin (NEURONTIN) 300 MG capsule Take 2 capsules (600 mg total) by mouth 2 (two)  "times daily.    glimepiride (AMARYL) 1 MG tablet TAKE 1 TABLET BEFORE BREAKFAST    hydrALAZINE (APRESOLINE) 50 MG tablet TAKE 1 TABLET TWICE DAILY    insulin degludec (TRESIBA FLEXTOUCH U-100) 100 unit/mL (3 mL) insulin pen Inject 20 Units into the skin once daily.    isosorbide mononitrate (IMDUR) 120 MG 24 hr tablet Take 1 tablet (120 mg total) by mouth once daily.    lancets Misc Check blood glucose 2x/day. True metrix. E11.65    linaCLOtide (LINZESS) 145 mcg Cap capsule Take 1 capsule (145 mcg total) by mouth before breakfast.    meclizine (ANTIVERT) 25 mg tablet Take 1 tablet (25 mg total) by mouth 3 (three) times daily as needed for Dizziness (or at least one HS for 1 week when vertigo active).    metFORMIN (GLUCOPHAGE-XR) 500 MG ER 24hr tablet Take 1 tablet with breakfast and 2 tablets with supper.    metFORMIN (GLUCOPHAGE-XR) 500 MG ER 24hr tablet Take 1 tablet with breakfast and 2 tablets with supper.    methocarbamoL (ROBAXIN) 750 MG Tab Take 1 tablet (750 mg total) by mouth 3 (three) times daily as needed (muscle spasms).    nitroGLYCERIN (NITROSTAT) 0.4 MG SL tablet Place 1 tablet (0.4 mg total) under the tongue every 5 (five) minutes as needed for Chest pain.    [START ON 1/11/2025] omega-3 acid ethyl esters (LOVAZA) 1 gram capsule Take 2 capsules (2 g total) by mouth 2 (two) times daily.    ondansetron (ZOFRAN-ODT) 4 MG TbDL Dissolvxe 1 tablet (4 mg total) by mouth every 6 (six) hours as needed (nausea).    pantoprazole (PROTONIX) 40 MG tablet TAKE 1 TABLET EVERY DAY    pen needle, diabetic 32 gauge x 1/4" Ndle Use daily    semaglutide (OZEMPIC) 2 mg/dose (8 mg/3 mL) PnIj Inject 2 mg into the skin every 7 days.    triazolam (HALCION) 0.25 MG Tab TAKE 1 TABLET BY MOUTH EVERY NIGHT AT BEDTIME AS NEEDED     Family History       Problem Relation (Age of Onset)    Blindness Brother    Depression Mother    Diabetes Sister    Heart disease Mother    No Known Problems Sister, Sister, Maternal Aunt, Maternal " Uncle, Paternal Aunt, Paternal Uncle, Maternal Grandmother, Maternal Grandfather, Paternal Grandmother, Paternal Grandfather    Stroke Father          Tobacco Use    Smoking status: Never     Passive exposure: Never    Smokeless tobacco: Never   Substance and Sexual Activity    Alcohol use: Not Currently    Drug use: No    Sexual activity: Yes     Partners: Female     Review of Systems   Reason unable to perform ROS: ROS was performed and pertinent +s and -s are listed in HPI.     Objective:     Vital Signs (Most Recent):  Temp: 98.3 °F (36.8 °C) (01/10/25 0000)  Pulse: 61 (01/10/25 0000)  Resp: 18 (01/09/25 2103)  BP: (!) 217/93 (01/10/25 0000)  SpO2: 96 % (01/10/25 0000) Vital Signs (24h Range):  Temp:  [97.5 °F (36.4 °C)-98.3 °F (36.8 °C)] 98.3 °F (36.8 °C)  Pulse:  [48-61] 61  Resp:  [15-20] 18  SpO2:  [95 %-99 %] 96 %  BP: (136-217)/() 217/93     Weight: 79.4 kg (175 lb)  Body mass index is 27.41 kg/m².     Physical Exam  Constitutional:       General: He is not in acute distress.     Appearance: Normal appearance.   Neck:      Comments: JVP not elevated  Cardiovascular:      Rate and Rhythm: Normal rate and regular rhythm.      Pulses: Normal pulses.      Heart sounds: Normal heart sounds.   Pulmonary:      Effort: Pulmonary effort is normal.      Breath sounds: Normal breath sounds.   Abdominal:      General: Abdomen is flat. Bowel sounds are normal.      Tenderness: There is no abdominal tenderness. There is no guarding.   Musculoskeletal:         General: Tenderness and signs of injury (R temporal area) present.      Right lower leg: No edema.      Left lower leg: No edema.   Skin:     General: Skin is warm and dry.      Capillary Refill: Capillary refill takes less than 2 seconds.      Coloration: Skin is not jaundiced.   Neurological:      General: No focal deficit present.      Mental Status: He is alert. He is confused.   Psychiatric:         Mood and Affect: Mood normal.                 Significant Labs: All pertinent labs within the past 24 hours have been reviewed.  CBC:   Recent Labs   Lab 01/09/25  1113   WBC 8.80   HGB 12.7*   HCT 41.3        CMP:   Recent Labs   Lab 01/09/25  1113      K 4.6      CO2 25   GLU 76   BUN 28*   CREATININE 1.5*   CALCIUM 9.0   PROT 6.8   ALBUMIN 3.4*   BILITOT 0.7   ALKPHOS 27*   AST 19   ALT 14   ANIONGAP 8       Significant Imaging: I have reviewed all pertinent imaging results/findings within the past 24 hours.

## 2025-01-10 NOTE — ASSESSMENT & PLAN NOTE
"Last A1c reviewed-   Lab Results   Component Value Date    HGBA1C 6.3 (H) 12/30/2024     Home antihyperglycemic regimen: metformin, Ozempic, amaryl    No results for input(s): "POCTGLUCOSE" in the last 72 hours.  Most recent inpatient antihyperglycemic regimen:   Antihyperglycemics (From admission, onward)      Start     Stop Route Frequency Ordered    01/10/25 0124  insulin aspart U-100 pen 0-5 Units         -- SubQ Every 4 hours PRN 01/10/25 0024          Goal blood glucose range while admitted: 140-180 per the NICE-SUGAR trial and American Diabetes Association guidelines  Diabetic diet 2000 jim  Rechecking A1c if most recent result above > 6 months ago  Diabetic counseling and education (eg nutrition, insulin) to be given prior to discharge          "

## 2025-01-10 NOTE — CONSULTS
[facility] Emergency Dept  Neurology  Consult Note    Patient Name: Driss Hernandez Jr.  MRN: [id number]  Admission Date: 2025  Hospital Length of Stay: 0 days  Code Status: Full Code   Attending Provider: Keyur Mello MD   Consulting Provider: Arjun Franco MD  Primary Care Physician: Jono Willoughby MD  Principal Problem:Encephalopathy    Inpatient consult to Neurology  Consult performed by: Arjun Franco MD  Consult ordered by: Biju Talavera PA-C         Subjective:     Chief Complaint:  Encephalopathy      HPI:   75 yo male with medical history of L4/5 laminectomy; S/p recent MIS Right L4-5 laminectomy; chronic balance issues, chronic pain on oxycodone, DM, CAD s/p CABG, HTN, HLD, AFib on Eliquis, and CKD 3  - with admission concerns of encephalopathy. And neurology has been consulted for the same.     History from patient / family and medical records review. Patient was found slided down on the side in the bathroom - with wife reporting patient was noted to be confused / starred off / with delay in responses. No obvious evidence of clinical seizures / loss of bowel or bladder continence / tongue biting - had bruising and bleeding from the arm. Background includes recent surgery / pain medication management - however no clear report of any pain related vagal event. No report of any preceding diaphoresis / felling pale / generalized weakness. No clear emotional / situational / orthostatic trigger. No associated cardiogenic triggers. Gradual improvement in symptoms over the time and level of awareness.. No focal motor or sensory or cranial nerve deficits. No history of primary autonomic failure / cardiac arrhyhtmia pathology. No history of stroke / seizures / complex migraines. No similar event  in the past.     Investigations - mild MADDIE on admission. Afib on AC. CT head negative for any acute findings.   Exam awake alert, oriented to person / family /  / current affairs /  not to month but year / no focal motor or sensory or cranial nerve deficits     Past Medical History:   Diagnosis Date    Chronic heart failure with preserved ejection fraction 2/9/2023    Chronic midline low back pain without sciatica 10/2/2017    Colon polyps     Coronary artery disease involving native coronary artery of native heart 3/5/2020    Coronary artery disease involving native coronary artery of native heart with angina pectoris 12/10/2020    Diabetes mellitus with neurological manifestations, uncontrolled 1/24/2017    Diabetic polyneuropathy associated with type 2 diabetes mellitus 1/24/2017    Diabetic polyneuropathy associated with type 2 diabetes mellitus     Encephalopathy 1/10/2025    Essential hypertension 1/24/2017    Gastroesophageal reflux disease 1/24/2017    Hyperlipidemia 1/24/2017    Insomnia 1/24/2017    Long-term insulin use 1/24/2017    Longstanding persistent atrial fibrillation 12/30/2020    Lumbar spondylosis 11/13/2017    Nuclear sclerosis of both eyes 8/24/2017    Obesity     PAD (peripheral artery disease) 3/23/2022    Stage 3 chronic kidney disease 2/13/2019    Uncontrolled type 2 diabetes mellitus without complication, with long-term current use of insulin 1/24/2017       Past Surgical History:   Procedure Laterality Date    ARTHROSCOPIC REPAIR OF ROTATOR CUFF OF SHOULDER Right 7/5/2022    Procedure: REPAIR, ROTATOR CUFF, ARTHROSCOPIC;  Surgeon: Valerie Olivera MD;  Location: [facility];  Service: Orthopedics;  Laterality: Right;  ANAYELI AND SAADIA LEMUS [phone]/ TEXTED ALLAN ON 6/23/2022 @ 9:47AM. ALLAN RESPONDED ON 6/23/2022 @ 10:33AM-BREANNA BABIN [phone] MY BE HERE FOR CASE SPOKE TO HIM @ 9:59AM ON 7-1-2022  RN PREOP 6/28/2022    BACK SURGERY      2002    CATARACT EXTRACTION W/  INTRAOCULAR LENS IMPLANT Right 08/29/2017    Dr. Hong    CATARACT EXTRACTION W/  INTRAOCULAR LENS IMPLANT Left 02/24/2022    Dr. Hong    CAUDAL EPIDURAL STEROID INJECTION  N/A 9/25/2024    Procedure: Caudal Epidural Steroid Injection;  Surgeon: Eze Byrd Jr., MD;  Location: [facility] PAIN MANAGEMENT;  Service: Pain Management;  Laterality: N/A;  @1100(prev. 715)  Eliquis last 9/21  Plavix last 9/19  Check BG  E-Consent    COLONOSCOPY N/A 2/21/2017    Procedure: COLONOSCOPY;  Surgeon: Robb Rosado MD;  Location: [facility] ENDO;  Service: Endoscopy;  Laterality: N/A;    COLONOSCOPY N/A 7/5/2023    Procedure: COLONOSCOPY;  Surgeon: Aston Varela MD;  Location: [facility] ENDO;  Service: Endoscopy;  Laterality: N/A;  Referral: JOVANNI SCANLON to hold Plavix 5 days and Eliquis 2 days per Dr Ford   Meds  Suprep   Inst portal   LW    CORONARY ANGIOGRAPHY INCLUDING BYPASS GRAFTS WITH CATHETERIZATION OF LEFT HEART N/A 11/11/2020    Procedure: ANGIOGRAM, CORONARY, INCLUDING BYPASS GRAFT, WITH LEFT HEART CATHETERIZATION;  Surgeon: Lane Cuellar MD;  Location: [facility] CATH LAB;  Service: Cardiology;  Laterality: N/A;  RN PRE OP 11-5-2020. --COVID NEGATIVE ON  11-. C A    CORONARY ARTERY BYPASS GRAFT      March 2016    EPIDURAL STEROID INJECTION Bilateral 11/14/2018    Procedure: Lumbar Medial Branch Blocks;  Surgeon: Eze Byrd Jr., MD;  Location: [facility];  Service: Pain Management;  Laterality: Bilateral;  Bilateral Lumbar Medial Branch Blocks L2, L3, L4, L5    91100  [id number]    Arrive @ 1150; NO Sedation    EPIDURAL STEROID INJECTION Bilateral 11/28/2018    Procedure: Injection, Steroid, Epidural;  Surgeon: Eze Byrd Jr., MD;  Location: [facility];  Service: Pain Management;  Laterality: Bilateral;  Bilateral Sacroiliac Joint Steroid Injections    [id number]    Arrive @ 0910    EPIDURAL STEROID INJECTION Bilateral 3/20/2019    Procedure: Injection, Steroid, Sacroiliiac Joint;  Surgeon: Eze Byrd Jr., MD;  Location: [facility];  Service: Pain Management;  Laterality: Bilateral;  Bilateral Sacroiliac Joint Steroid Injections    [id number]    Arrive @ 1145;  Trigger point injections also?    EPIDURAL STEROID INJECTION Right 2023    Procedure: Right L5 Transforaminal Epidural Steroid Injection;  Surgeon: Eze Byrd Jr., MD;  Location: [facility] ENDO;  Service: Pain Management;  Laterality: Right;  @0830 (given)  Eliquis last   Plavix last   Check BG    EPIDURAL STEROID INJECTION Right 10/11/2023    Procedure: Right L3 (infraneural) + S1 Transforaminal Epidural Steroid Injections;  Surgeon: Eze Byrd Jr., MD;  Location: [facility] ENDO;  Service: Pain Management;  Laterality: Right;  @0745 (requests morning)  Eliquis 10/7 & Plavix 10/5  Check BG    ESOPHAGOGASTRODUODENOSCOPY N/A 2023    Procedure: EGD (ESOPHAGOGASTRODUODENOSCOPY);  Surgeon: Anita Oswald MD;  Location: [facility] ENDO;  Service: Endoscopy;  Laterality: N/A;  Procedure Timin-4 weeks  Provider: Any GI provider  Location: No Preference  Additional Scheduling Information: Blood thinners  Prep Specifications:N/A   ref. by Dr. Light, instr. to portal, , [facility] meds-st  ok to hold Plavix 5 days and Eliquis 2 day    INTRAOCULAR PROSTHESES INSERTION Left 2022    Procedure: INSERTION, IOL PROSTHESIS;  Surgeon: Nickolas Hong MD;  Location: [facility] OR;  Service: Ophthalmology;  Laterality: Left;    LAMINOFORAMINOTOMY OF SPINE USING MINIMALLY INVASIVE TECHNIQUE Right 2025    Procedure: LAMINOFORAMINOTOMY, SPINE, MINIMALLY INVASIVE;  Surgeon: Luis M Ellis MD;  Location: [facility] OR;  Service: Neurosurgery;  Laterality: Right;  Right L4/5 minimally invasive laminotomy/microdiscectomy. silvana table, lucio frame, fluoro, microscope, no vendor, no monitoring  CLEARED BY CARDS AND PCP   RN PREOP 24---T/S--done -----NEED ORDERS    PHACOEMULSIFICATION OF CATARACT Left 2022    Procedure: PHACOEMULSIFICATION, CATARACT;  Surgeon: Nickolas Hong MD;  Location: [facility] OR;  Service: Ophthalmology;  Laterality: Left;  RN Phone Pre Op -.  Covid NEGATIVE   2-23-22.  Arrival 08:00 am.    TONSILLECTOMY         Review of patient's allergies indicates:   Allergen Reactions    Penicillins Other (See Comments)     Unknown reaction, Had a reaction as a child    Shrimp Itching     Hand Itching       Current Neurological Medications:     Current Facility-Administered Medications on File Prior to Encounter   Medication    cyclopentolate 1% ophthalmic solution 1 drop    ofloxacin 0.3 % ophthalmic solution 1 drop    sodium chloride 0.9% flush 10 mL    triamcinolone acetonide injection 40 mg     Current Outpatient Medications on File Prior to Encounter   Medication Sig    HYDROcodone-acetaminophen (NORCO)  mg per tablet Take 1 tablet by mouth every 4 (four) hours as needed for Pain (7-10/10 pain).    acetaminophen (TYLENOL) 650 MG TbSR Take 650 mg by mouth every 8 (eight) hours.    albuterol (PROVENTIL/VENTOLIN HFA) 90 mcg/actuation inhaler Inhale 2 puffs into the lungs every 4 (four) hours as needed for Shortness of Breath. Rescue    [START ON 1/11/2025] apixaban (ELIQUIS) 5 mg Tab Take 1 tablet (5 mg total) by mouth 2 (two) times daily.    ascorbic acid, vitamin C, (VITAMIN C) 100 MG tablet Take 100 mg by mouth daily as needed.    atorvastatin (LIPITOR) 80 MG tablet TAKE 1 TABLET EVERY EVENING    b complex vitamins tablet Take 1 tablet by mouth once daily.    blood-glucose meter kit Test glucose 2-3x/day. True metrix    clopidogreL (PLAVIX) 75 mg tablet Take 75 mg by mouth once daily.    [START ON 1/11/2025] coenzyme Q10 100 mg capsule Take 1 capsule (100 mg total) by mouth once daily.    dapagliflozin propanediol (FARXIGA) 5 mg Tab tablet Take 1 tablet (5 mg total) by mouth once daily.    ergocalciferol (ERGOCALCIFEROL) 50,000 unit Cap TAKE 1 CAPSULE BY MOUTH WEEKLY    fenofibrate 160 MG Tab TAKE 1 TABLET EVERY MORNING    fluticasone propionate (FLONASE) 50 mcg/actuation nasal spray 2 sprays (100 mcg total) by Each Nostril route 2 (two) times daily.    furosemide (LASIX)  "20 MG tablet TAKE 1 TABLET TWICE DAILY    gabapentin (NEURONTIN) 300 MG capsule Take 2 capsules (600 mg total) by mouth 2 (two) times daily.    glimepiride (AMARYL) 1 MG tablet TAKE 1 TABLET BEFORE BREAKFAST    hydrALAZINE (APRESOLINE) 50 MG tablet TAKE 1 TABLET TWICE DAILY    insulin degludec (TRESIBA FLEXTOUCH U-100) 100 unit/mL (3 mL) insulin pen Inject 20 Units into the skin once daily.    isosorbide mononitrate (IMDUR) 120 MG 24 hr tablet Take 1 tablet (120 mg total) by mouth once daily.    lancets Misc Check blood glucose 2x/day. True metrix. E11.65    linaCLOtide (LINZESS) 145 mcg Cap capsule Take 1 capsule (145 mcg total) by mouth before breakfast.    meclizine (ANTIVERT) 25 mg tablet Take 1 tablet (25 mg total) by mouth 3 (three) times daily as needed for Dizziness (or at least one HS for 1 week when vertigo active).    metFORMIN (GLUCOPHAGE-XR) 500 MG ER 24hr tablet Take 1 tablet with breakfast and 2 tablets with supper.    metFORMIN (GLUCOPHAGE-XR) 500 MG ER 24hr tablet Take 1 tablet with breakfast and 2 tablets with supper.    methocarbamoL (ROBAXIN) 750 MG Tab Take 1 tablet (750 mg total) by mouth 3 (three) times daily as needed (muscle spasms).    nitroGLYCERIN (NITROSTAT) 0.4 MG SL tablet Place 1 tablet (0.4 mg total) under the tongue every 5 (five) minutes as needed for Chest pain.    [START ON 1/11/2025] omega-3 acid ethyl esters (LOVAZA) 1 gram capsule Take 2 capsules (2 g total) by mouth 2 (two) times daily.    ondansetron (ZOFRAN-ODT) 4 MG TbDL Dissolvxe 1 tablet (4 mg total) by mouth every 6 (six) hours as needed (nausea).    pantoprazole (PROTONIX) 40 MG tablet TAKE 1 TABLET EVERY DAY    pen needle, diabetic 32 gauge x 1/4" Ndle Use daily    semaglutide (OZEMPIC) 2 mg/dose (8 mg/3 mL) PnIj Inject 2 mg into the skin every 7 days.    triazolam (HALCION) 0.25 MG Tab TAKE 1 TABLET BY MOUTH EVERY NIGHT AT BEDTIME AS NEEDED     Family History       Problem Relation (Age of Onset)    Blindness Brother "    Depression Mother    Diabetes Sister    Heart disease Mother    No Known Problems Sister, Sister, Maternal Aunt, Maternal Uncle, Paternal Aunt, Paternal Uncle, Maternal Grandmother, Maternal Grandfather, Paternal Grandmother, Paternal Grandfather    Stroke Father          Tobacco Use    Smoking status: Never     Passive exposure: Never    Smokeless tobacco: Never   Substance and Sexual Activity    Alcohol use: Not Currently    Drug use: No    Sexual activity: Yes     Partners: Female     Review of Systems   Constitutional:  Negative for chills and fever.   Respiratory:  Negative for shortness of breath.    Cardiovascular:  Negative for chest pain.   Neurological:  Negative for seizures, facial asymmetry, weakness and numbness.   Psychiatric/Behavioral:  Positive for confusion. Negative for agitation.      Objective:     Vital Signs (Most Recent):  Temp: 98.3 °F (36.8 °C) (01/10/25 1047)  Pulse: (!) 51 (01/10/25 0722)  Resp: (!) 22 (01/10/25 0618)  BP: (!) 199/83 (01/10/25 0722)  SpO2: 97 % (01/10/25 0722) Vital Signs (24h Range):  Temp:  [97.7 °F (36.5 °C)-98.6 °F (37 °C)] 98.3 °F (36.8 °C)  Pulse:  [47-61] 51  Resp:  [15-22] 22  SpO2:  [92 %-99 %] 97 %  BP: (136-205)/() 199/83     Weight: 79.4 kg (175 lb)  Body mass index is 27.41 kg/m².     Physical Exam  HENT:      Head: Normocephalic.   Eyes:      Extraocular Movements: Extraocular movements intact and EOM normal.   Pulmonary:      Effort: No respiratory distress.   Neurological:      Mental Status: He is alert.   Psychiatric:         Speech: Speech normal.          NEUROLOGICAL EXAMINATION:     MENTAL STATUS   Oriented to person.   Disoriented to month. Oriented to year.   Attention: decreased. Concentration: decreased.   Speech: speech is normal   Level of consciousness: alert  Normal comprehension.     CRANIAL NERVES     CN III, IV, VI   Extraocular motions are normal.   Nystagmus: none   Ophthalmoparesis: none    CN VII   Facial expression full,  "symmetric.     MOTOR EXAM        No new focal motor deficits       SENSORY EXAM        No new focal sensory deficits       GAIT AND COORDINATION     Tremor   Resting tremor: absent      Significant Labs: Hemoglobin A1c:   Recent Labs   Lab 12/30/24  1200   HGBA1C 6.3*     Blood Culture: No results for input(s): "LABBLOO" in the last 48 hours.  BMP:   Recent Labs   Lab 01/09/25  1113 01/10/25  0328   GLU 76 76    140   K 4.6 4.5    111*   CO2 25 20*   BUN 28* 24*   CREATININE 1.5* 1.1   CALCIUM 9.0 9.4   MG 1.8 1.7     CBC:   Recent Labs   Lab 01/09/25  1113 01/10/25  0328   WBC 8.80 7.76   HGB 12.7* 13.4*   HCT 41.3 42.8    210     CMP:   Recent Labs   Lab 01/09/25  1113 01/10/25  0328   GLU 76 76    140   K 4.6 4.5    111*   CO2 25 20*   BUN 28* 24*   CREATININE 1.5* 1.1   CALCIUM 9.0 9.4   MG 1.8 1.7   PROT 6.8 6.4   ALBUMIN 3.4* 3.3*   BILITOT 0.7 0.8   ALKPHOS 27* 26*   AST 19 16   ALT 14 14   ANIONGAP 8 9     CSF Culture: No results for input(s): "CSFCULTURE" in the last 48 hours.  CSF Studies: No results for input(s): "ALIQUT", "APPEARCSF", "COLORCSF", "CSFWBC", "CSFRBC", "GLUCCSF", "LDHCSF", "PROTEINCSF", "VDRLCSF" in the last 48 hours.  Inflammatory Markers: No results for input(s): "SEDRATE", "CRP", "PROCAL" in the last 48 hours.  POCT Glucose: No results for input(s): "POCTGLUCOSE" in the last 24 hours.  Prealbumin: No results for input(s): "PREALBUMIN" in the last 48 hours.  Respiratory Culture: No results for input(s): "GSRESP", "RESPIRATORYC" in the last 48 hours.  Urine Culture: No results for input(s): "LABURIN" in the last 48 hours.  Urine Studies:   Recent Labs   Lab 01/09/25  1619   COLORU Yellow   APPEARANCEUA Clear   PHUR >8.0*   SPECGRAV 1.015   PROTEINUA 1+*   GLUCUA 2+*   KETONESU Negative   BILIRUBINUA Negative   OCCULTUA Negative   NITRITE Negative   UROBILINOGEN 1.0   LEUKOCYTESUR Negative   RBCUA 5*   WBCUA 4   BACTERIA Occasional   HYALINECASTS 2*     Recent " Lab Results         01/10/25  0328   01/09/25  1619   01/09/25  1113        Benzodiazepines   Negative         Methadone metabolites   Negative         Phencyclidine   Negative         Albumin 3.3     3.4       Alcohol, Serum     <10       ALP 26     27       ALT 14     14       Ammonia     27       Amphetamines, Urine   Negative         Anion Gap 9     8       Appearance, UA   Clear         AST 16     19       Bacteria, UA   Occasional         Barbituates, Urine   Negative         Baso # 0.01     0.01       Basophil % 0.1     0.1       Bilirubin (UA)   Negative         BILIRUBIN TOTAL 0.8  Comment: For infants and newborns, interpretation of results should be based  on gestational age, weight and in agreement with clinical  observations.    Premature Infant recommended reference ranges:  Up to 24 hours.............<8.0 mg/dL  Up to 48 hours............<12.0 mg/dL  3-5 days..................<15.0 mg/dL  6-29 days.................<15.0 mg/dL       0.7  Comment: For infants and newborns, interpretation of results should be based  on gestational age, weight and in agreement with clinical  observations.    Premature Infant recommended reference ranges:  Up to 24 hours.............<8.0 mg/dL  Up to 48 hours............<12.0 mg/dL  3-5 days..................<15.0 mg/dL  6-29 days.................<15.0 mg/dL         BUN 24     28       Calcium 9.4     9.0       Chloride 111     108       CO2 20     25       Cocaine, Urine   Negative         Color, UA   Yellow         Creatinine 1.1     1.5       Urine Creatinine   91.8         Differential Method Automated     Automated       eGFR >60     48       Eos # 0.5     0.5       Eos % 6.2     5.8       Free T4 1.12           Glucose 76     76       Glucose, UA   2+         Gran # (ANC) 4.9     5.9       Gran % 63.1     66.5       Hematocrit 42.8     41.3       Hemoglobin 13.4     12.7       Hyaline Casts, UA   2         Immature Grans (Abs) 0.02  Comment: Mild elevation in  immature granulocytes is non specific and   can be seen in a variety of conditions including stress response,   acute inflammation, trauma and pregnancy. Correlation with other   laboratory and clinical findings is essential.       0.03  Comment: Mild elevation in immature granulocytes is non specific and   can be seen in a variety of conditions including stress response,   acute inflammation, trauma and pregnancy. Correlation with other   laboratory and clinical findings is essential.         Immature Granulocytes 0.3     0.3       INR     1.1  Comment: Coumadin Therapy:  2.0 - 3.0 for INR for all indicators except mechanical heart valves  and antiphospholipid syndromes which should use 2.5 - 3.5.         Ketones, UA   Negative         Leukocyte Esterase, UA   Negative         Lymph # 1.7     1.7       Lymph % 22.3     19.8       Magnesium  1.7     1.8       MCH 26.5     25.9       MCHC 31.3     30.8       MCV 85     84       Microscopic Comment   SEE COMMENT  Comment: Other formed elements not mentioned in the report are not   present in the microscopic examination.            Mono # 0.6     0.7       Mono % 8.0     7.5       MPV 9.7     10.1       NITRITE UA   Negative         nRBC 0     0       Blood, UA   Negative         Opiates, Urine   Presumptive Positive         pH, UA   >8.0         Phosphorus Level 3.6           Platelet Count 210     207       Potassium 4.5     4.6  Comment: Specimen slightly hemolyzed       PROTEIN TOTAL 6.4     6.8       Protein, UA   1+  Comment: Recommend a 24 hour urine protein or a urine   protein/creatinine ratio if globulin induced proteinuria is  clinically suspected.           PT     12.3       RBC 5.06     4.91       RBC, UA   5         RDW 15.2     15.5       Sodium 140     141       Spec Grav UA   1.015         Specimen UA   Urine, Clean Catch         Marijuana (THC) Metabolite   Negative         Toxicology Information   SEE COMMENT  Comment: This screen includes the  following classes of drugs at the listed   cut-off:    Benzodiazepines 200 ng/ml  Methadone 300 ng/ml  Cocaine metabolite 300 ng/ml  Opiates 300 ng/ml  Barbiturates 200 ng/ml  Amphetamines 1000 ng/ml  Marijuana metabs (THC) 50 ng/ml  Phencyclidine (PCP) 25 ng/ml    This is a screening test. If results do not correlate with clinical   presentation, then a confirmatory send out test is advised.     This report is intended for use in clinical monitoring and management   of   patients. It is not intended for use in employment related drug   testing.           TSH 8.716           UROBILINOGEN UA   1.0         WBC, UA   4         WBC 7.76     8.80             All pertinent lab results from the past 24 hours have been reviewed.    Significant Imaging: I have reviewed and interpreted all pertinent imaging results/findings within the past 24 hours.  Assessment and Plan:     * Encephalopathy  73 yo male with medical history of L4/5 laminectomy; S/p recent MIS Right L4-5 laminectomy; chronic balance issues, chronic pain on oxycodone, DM, CAD s/p CABG, HTN, HLD, AFib on Eliquis, and CKD 3  - with admission concerns of encephalopathy. And neurology has been consulted for the same.     History from patient / family and medical records review. Patient was found slided down on the side in the bathroom - with wife reporting patient was noted to be confused / starred off / with delay in responses. No obvious evidence of clinical seizures / loss of bowel or bladder continence / tongue biting - had bruising and bleeding from the arm. Background includes recent surgery / pain medication management - however no clear report of any pain related vagal event. No report of any preceding diaphoresis / felling pale / generalized weakness. No clear emotional / situational / orthostatic trigger. No associated cardiogenic triggers. Gradual improvement in symptoms over the time and level of awareness.. No focal motor or sensory or cranial nerve  deficits. No history of primary autonomic failure / cardiac arrhyhtmia pathology. No history of stroke / seizures / complex migraines. No similar event  in the past.     Investigations - mild MADDIE on admission. Afib on AC. CT head negative for any acute findings.   Exam awake alert, oriented to person / family /  / current affairs / not to month but year / no focal motor or sensory or cranial nerve deficits    Recs:   Suspect symptomatology in the spectrum of any atypical vagal event at the background of pain with confounders of medication related vs metabolic triggers of mild encephalopathy + undiagnosed spectrum of cognitive dysfunction. Low suspicion for cerebrovascular / epileptic etiology - however needs further evaluation.     MRI brain wo contrast  MRA brain wo contrast   MRA neck wo contrast   EEG routine / 1hr   - If investigations unrevealing can be followed up on OP basis     Avoid hypotension / maintain perfusion - hydration / long term goals < 140/90     Check ortho stats / PT-OT assistance on the same / correlate with any triggers related to home medications and modify accordingly if positive / modify daily activities and lifestyle as needed / prn use compression stockings and abdominal binders / maximize on non pharmacological measures    Continue home AC / control cardiogenic triggers    Cognitive evaluation on OP basis     -- Correct lytes as needed / control infection if any / avoid hypoxia or hypercapnia / avoid hypoglycemia   -- Correct any nutritional deficiencies / correct anemia / provide nutritional support as appropriate / ambulate when appropriate   -- PT/OT recs / gait assistance and fall precautions    -- delirium precautions          VTE Risk Mitigation (From admission, onward)           Ordered     Reason for No Pharmacological VTE Prophylaxis  Once        Question:  Reasons:  Answer:  Risk of Bleeding    25     IP VTE HIGH RISK PATIENT  Once         25     Place  sequential compression device  Until discontinued         01/09/25 3271                    Thank you for your consult.     Arjun Franco MD  Neurology  [facility] [place] - Emergency Dept

## 2025-01-10 NOTE — PROGRESS NOTES
CHW - Initial Contact    This Community Health Worker completed the Social Determinant of Health questionnaire with patient  at bedside  today.    Pt identified barriers of most importance are: has no needs at this time.   Referrals to community agencies completed with patient/caregiver consent outside of [facility] include: no: none at this time.  Referrals were put through [facility] - no: none at this time.  Support and Services: has no support at this time.  Other information discussed the patient needs / wants help with: Completed SDOH with patient at bedside today, pt has no needs at this time. Will follow up in one week to check pt's future needs.    Follow up required: yes.  Follow-up Outreach - Due: 1/16/2025

## 2025-01-10 NOTE — PLAN OF CARE
Consult received for HH.  No orders noted.  1906 patient placed in OBS with orders for PT&OT to evaluate.  CM to continue to follow and assist with DC planning as needed.

## 2025-01-10 NOTE — NURSING
Pt lying in bed, denies any pain to back or head. Just uncomfortable and wants monitors to stop beeping. Urinal and call light in reach, instructed pt to call for assistance.

## 2025-01-10 NOTE — H&P
[facility] [place] Emergency Dept  [facility] Medicine  History & Physical    Patient Name: Driss Hernandez Jr.  MRN: [id number]  Patient Class: OP- Observation  Admission Date: 1/9/2025  Attending Physician: Keyur Mello MD   Primary Care Provider: Jono Willoughby MD         Patient information was obtained from patient, spouse/SO, past medical records, and ER records.     Subjective:     Principal Problem:Fall at home    Chief Complaint:   Chief Complaint   Patient presents with    Loss of Consciousness     Pt arrived via ems,pt chief complaint syncope. Pt states felt weak prior to syncopal episode and woke up on ground but does not remember the fall. Pt complaining of back pain, pt also had a back related surgery on Monday as well.           HPI: Driss Hernandez Jr. is a 74 y.o. male with  Several spinal and balance issues, Chronic pain on oxycodone, Non-Insulin-dependent DM2 with neuropathic complications, CAD s/p CABG, HTN, HLD, AFib on Eliquis, and CKD 3  who presented to [facility] ED on 01/09/2025 for eval and treatment of a fall and near syncopal event.  Per EMS, pt was at home using the bathroom when he felt light-headed and had a syncopal episode. He was found by a family member on the ground and has been confused since the fall. Reports pt was hypertensive with /100 but vitals otherwise normal. Pt reports he sustained a small wound on his abdomen. He states he feels a little confused, but denies any headache, nausea, vomiting, CP, SOB, leg pain or numbness, neck pain, dysuria, visual disturbances, speech difficulty, hearing loss, cough, eye pain, sore throat, rhinorrhea, fever or other associated symptoms. He does report some back pain at his surgical site.    ED course notable for the following: Confused, and kept trying to stand up to leave.  Hypertensive and bradycardic to the 40s.  Exam with abrasion on forehead.  Labs BUN 28 Crt 1.5 eGFR 48 UA w hyaline casts.  EKG w bradycardia  and AFib w slow ventricular response.  Imaging: CTH without acute intracranial abnormality.  ED therapy/stabilization measures: pain control.  They were placed in observation under the care of [facility] for further evaluation and treatment.        Past Medical History:   Diagnosis Date    Chronic heart failure with preserved ejection fraction 2/9/2023    Chronic midline low back pain without sciatica 10/2/2017    Colon polyps     Coronary artery disease involving native coronary artery of native heart 3/5/2020    Coronary artery disease involving native coronary artery of native heart with angina pectoris 12/10/2020    Diabetes mellitus with neurological manifestations, uncontrolled 1/24/2017    Diabetic polyneuropathy associated with type 2 diabetes mellitus 1/24/2017    Diabetic polyneuropathy associated with type 2 diabetes mellitus     Essential hypertension 1/24/2017    Gastroesophageal reflux disease 1/24/2017    Hyperlipidemia 1/24/2017    Insomnia 1/24/2017    Long-term insulin use 1/24/2017    Longstanding persistent atrial fibrillation 12/30/2020    Lumbar spondylosis 11/13/2017    Nuclear sclerosis of both eyes 8/24/2017    Obesity     PAD (peripheral artery disease) 3/23/2022    Stage 3 chronic kidney disease 2/13/2019    Uncontrolled type 2 diabetes mellitus without complication, with long-term current use of insulin 1/24/2017       Past Surgical History:   Procedure Laterality Date    ARTHROSCOPIC REPAIR OF ROTATOR CUFF OF SHOULDER Right 7/5/2022    Procedure: REPAIR, ROTATOR CUFF, ARTHROSCOPIC;  Surgeon: Valerie Olivera MD;  Location: [facility];  Service: Orthopedics;  Laterality: Right;  HETAL LEMUS [phone]/ TEXTED ALLAN ON 6/23/2022 @ 9:47AM. ALLAN RESPONDED ON 6/23/2022 @ 10:33AM-  JEAN PAUL BABIN [phone] MY BE HERE FOR CASE SPOKE TO HIM @ 9:59AM ON 7-1-2022  RN PREOP 6/28/2022    BACK SURGERY      2002    CATARACT EXTRACTION W/  INTRAOCULAR LENS  IMPLANT Right 08/29/2017    Dr. Hong    CATARACT EXTRACTION W/  INTRAOCULAR LENS IMPLANT Left 02/24/2022    Dr. Hong    CAUDAL EPIDURAL STEROID INJECTION N/A 9/25/2024    Procedure: Caudal Epidural Steroid Injection;  Surgeon: Eze Byrd Jr., MD;  Location: [facility] PAIN MANAGEMENT;  Service: Pain Management;  Laterality: N/A;  @1100(prev. 715)  Eliquis last 9/21  Plavix last 9/19  Check BG  E-Consent    COLONOSCOPY N/A 2/21/2017    Procedure: COLONOSCOPY;  Surgeon: Robb Rosado MD;  Location: [facility] ENDO;  Service: Endoscopy;  Laterality: N/A;    COLONOSCOPY N/A 7/5/2023    Procedure: COLONOSCOPY;  Surgeon: Aston Varela MD;  Location: [facility] ENDO;  Service: Endoscopy;  Laterality: N/A;  Referral: JOVANNI SCANLON  ok to hold Plavix 5 days and Eliquis 2 days per Dr Purdy-OFELIA   Meds  Suprep   Inst portal   LW    CORONARY ANGIOGRAPHY INCLUDING BYPASS GRAFTS WITH CATHETERIZATION OF LEFT HEART N/A 11/11/2020    Procedure: ANGIOGRAM, CORONARY, INCLUDING BYPASS GRAFT, WITH LEFT HEART CATHETERIZATION;  Surgeon: Lane Cuellar MD;  Location: [facility] CATH LAB;  Service: Cardiology;  Laterality: N/A;  RN PRE OP 11-5-2020. --COVID NEGATIVE ON  11-. C A    CORONARY ARTERY BYPASS GRAFT      March 2016    EPIDURAL STEROID INJECTION Bilateral 11/14/2018    Procedure: Lumbar Medial Branch Blocks;  Surgeon: Eze Byrd Jr., MD;  Location: [facility] ENDO;  Service: Pain Management;  Laterality: Bilateral;  Bilateral Lumbar Medial Branch Blocks L2, L3, L4, L5    51333  [id number]    Arrive @ 1150; NO Sedation    EPIDURAL STEROID INJECTION Bilateral 11/28/2018    Procedure: Injection, Steroid, Epidural;  Surgeon: Eze Byrd Jr., MD;  Location: [facility] ENDO;  Service: Pain Management;  Laterality: Bilateral;  Bilateral Sacroiliac Joint Steroid Injections    03887    Arrive @ 0910    EPIDURAL STEROID INJECTION Bilateral 3/20/2019    Procedure: Injection, Steroid, Sacroiliiac Joint;  Surgeon: Eze  ROSEANNE Byrd Jr., MD;  Location: [facility] ENDO;  Service: Pain Management;  Laterality: Bilateral;  Bilateral Sacroiliac Joint Steroid Injections    42129    Arrive @ 1145; Trigger point injections also?    EPIDURAL STEROID INJECTION Right 2023    Procedure: Right L5 Transforaminal Epidural Steroid Injection;  Surgeon: Eze Byrd Jr., MD;  Location: [facility] ENDO;  Service: Pain Management;  Laterality: Right;  @0830 (given)  Eliquis last   Plavix last   Check BG    EPIDURAL STEROID INJECTION Right 10/11/2023    Procedure: Right L3 (infraneural) + S1 Transforaminal Epidural Steroid Injections;  Surgeon: Eze Byrd Jr., MD;  Location: [facility] ENDO;  Service: Pain Management;  Laterality: Right;  @0745 (requests morning)  Eliquis 10/7 & Plavix 10/5  Check BG    ESOPHAGOGASTRODUODENOSCOPY N/A 2023    Procedure: EGD (ESOPHAGOGASTRODUODENOSCOPY);  Surgeon: Anita Oswald MD;  Location: [facility] ENDO;  Service: Endoscopy;  Laterality: N/A;  Procedure Timin-4 weeks  Provider: Any GI provider  Location: No Preference  Additional Scheduling Information: Blood thinners  Prep Specifications:N/A   ref. by Dr. Light, instr. to portal, ,  meds-st  ok to hold Plavix 5 days and Eliquis 2 day    INTRAOCULAR PROSTHESES INSERTION Left 2022    Procedure: INSERTION, IOL PROSTHESIS;  Surgeon: Nickolas Hong MD;  Location: [facility] OR;  Service: Ophthalmology;  Laterality: Left;    LAMINOFORAMINOTOMY OF SPINE USING MINIMALLY INVASIVE TECHNIQUE Right 2025    Procedure: LAMINOFORAMINOTOMY, SPINE, MINIMALLY INVASIVE;  Surgeon: Luis M Ellis MD;  Location: [facility] OR;  Service: Neurosurgery;  Laterality: Right;  Right L4/5 minimally invasive laminotomy/microdiscectomy. silvana crisostomo, lucio frame, fluoro, microscope, no vendor, no monitoring  CLEARED BY CARDS AND PCP   RN PREOP 24---T/S--done -----NEED ORDERS    PHACOEMULSIFICATION OF CATARACT Left 2022    Procedure:  PHACOEMULSIFICATION, CATARACT;  Surgeon: Nickolas Hong MD;  Location: [facility];  Service: Ophthalmology;  Laterality: Left;  RN Phone Pre Op 2-16-22.  Covid NEGATIVE  2-23-22.  Arrival 08:00 am.    TONSILLECTOMY         Review of patient's allergies indicates:   Allergen Reactions    Penicillins Other (See Comments)     Unknown reaction, Had a reaction as a child    Shrimp Itching     Hand Itching       Current Facility-Administered Medications on File Prior to Encounter   Medication    cyclopentolate 1% ophthalmic solution 1 drop    ofloxacin 0.3 % ophthalmic solution 1 drop    sodium chloride 0.9% flush 10 mL    triamcinolone acetonide injection 40 mg     Current Outpatient Medications on File Prior to Encounter   Medication Sig    HYDROcodone-acetaminophen (NORCO)  mg per tablet Take 1 tablet by mouth every 4 (four) hours as needed for Pain (7-10/10 pain).    acetaminophen (TYLENOL) 650 MG TbSR Take 650 mg by mouth every 8 (eight) hours.    albuterol (PROVENTIL/VENTOLIN HFA) 90 mcg/actuation inhaler Inhale 2 puffs into the lungs every 4 (four) hours as needed for Shortness of Breath. Rescue    [START ON 1/11/2025] apixaban (ELIQUIS) 5 mg Tab Take 1 tablet (5 mg total) by mouth 2 (two) times daily.    ascorbic acid, vitamin C, (VITAMIN C) 100 MG tablet Take 100 mg by mouth daily as needed.    atorvastatin (LIPITOR) 80 MG tablet TAKE 1 TABLET EVERY EVENING    b complex vitamins tablet Take 1 tablet by mouth once daily.    blood-glucose meter kit Test glucose 2-3x/day. True metrix    clopidogreL (PLAVIX) 75 mg tablet Take 75 mg by mouth once daily.    [START ON 1/11/2025] coenzyme Q10 100 mg capsule Take 1 capsule (100 mg total) by mouth once daily.    dapagliflozin propanediol (FARXIGA) 5 mg Tab tablet Take 1 tablet (5 mg total) by mouth once daily.    ergocalciferol (ERGOCALCIFEROL) 50,000 unit Cap TAKE 1 CAPSULE BY MOUTH WEEKLY    fenofibrate 160 MG Tab TAKE 1 TABLET EVERY MORNING    fluticasone  "propionate (FLONASE) 50 mcg/actuation nasal spray 2 sprays (100 mcg total) by Each Nostril route 2 (two) times daily.    furosemide (LASIX) 20 MG tablet TAKE 1 TABLET TWICE DAILY    gabapentin (NEURONTIN) 300 MG capsule Take 2 capsules (600 mg total) by mouth 2 (two) times daily.    glimepiride (AMARYL) 1 MG tablet TAKE 1 TABLET BEFORE BREAKFAST    hydrALAZINE (APRESOLINE) 50 MG tablet TAKE 1 TABLET TWICE DAILY    insulin degludec (TRESIBA FLEXTOUCH U-100) 100 unit/mL (3 mL) insulin pen Inject 20 Units into the skin once daily.    isosorbide mononitrate (IMDUR) 120 MG 24 hr tablet Take 1 tablet (120 mg total) by mouth once daily.    lancets Misc Check blood glucose 2x/day. True metrix. E11.65    linaCLOtide (LINZESS) 145 mcg Cap capsule Take 1 capsule (145 mcg total) by mouth before breakfast.    meclizine (ANTIVERT) 25 mg tablet Take 1 tablet (25 mg total) by mouth 3 (three) times daily as needed for Dizziness (or at least one HS for 1 week when vertigo active).    metFORMIN (GLUCOPHAGE-XR) 500 MG ER 24hr tablet Take 1 tablet with breakfast and 2 tablets with supper.    metFORMIN (GLUCOPHAGE-XR) 500 MG ER 24hr tablet Take 1 tablet with breakfast and 2 tablets with supper.    methocarbamoL (ROBAXIN) 750 MG Tab Take 1 tablet (750 mg total) by mouth 3 (three) times daily as needed (muscle spasms).    nitroGLYCERIN (NITROSTAT) 0.4 MG SL tablet Place 1 tablet (0.4 mg total) under the tongue every 5 (five) minutes as needed for Chest pain.    [START ON 1/11/2025] omega-3 acid ethyl esters (LOVAZA) 1 gram capsule Take 2 capsules (2 g total) by mouth 2 (two) times daily.    ondansetron (ZOFRAN-ODT) 4 MG TbDL Dissolvxe 1 tablet (4 mg total) by mouth every 6 (six) hours as needed (nausea).    pantoprazole (PROTONIX) 40 MG tablet TAKE 1 TABLET EVERY DAY    pen needle, diabetic 32 gauge x 1/4" Ndle Use daily    semaglutide (OZEMPIC) 2 mg/dose (8 mg/3 mL) PnIj Inject 2 mg into the skin every 7 days.    triazolam (HALCION) 0.25 " MG Tab TAKE 1 TABLET BY MOUTH EVERY NIGHT AT BEDTIME AS NEEDED     Family History       Problem Relation (Age of Onset)    Blindness Brother    Depression Mother    Diabetes Sister    Heart disease Mother    No Known Problems Sister, Sister, Maternal Aunt, Maternal Uncle, Paternal Aunt, Paternal Uncle, Maternal Grandmother, Maternal Grandfather, Paternal Grandmother, Paternal Grandfather    Stroke Father          Tobacco Use    Smoking status: Never     Passive exposure: Never    Smokeless tobacco: Never   Substance and Sexual Activity    Alcohol use: Not Currently    Drug use: No    Sexual activity: Yes     Partners: Female     Review of Systems   Reason unable to perform ROS: ROS was performed and pertinent +s and -s are listed in HPI.     Objective:     Vital Signs (Most Recent):  Temp: 98.3 °F (36.8 °C) (01/10/25 0000)  Pulse: 61 (01/10/25 0000)  Resp: 18 (01/09/25 2103)  BP: (!) 217/93 (01/10/25 0000)  SpO2: 96 % (01/10/25 0000) Vital Signs (24h Range):  Temp:  [97.5 °F (36.4 °C)-98.3 °F (36.8 °C)] 98.3 °F (36.8 °C)  Pulse:  [48-61] 61  Resp:  [15-20] 18  SpO2:  [95 %-99 %] 96 %  BP: (136-217)/() 217/93     Weight: 79.4 kg (175 lb)  Body mass index is 27.41 kg/m².     Physical Exam  Constitutional:       General: He is not in acute distress.     Appearance: Normal appearance.   Neck:      Comments: JVP not elevated  Cardiovascular:      Rate and Rhythm: Normal rate and regular rhythm.      Pulses: Normal pulses.      Heart sounds: Normal heart sounds.   Pulmonary:      Effort: Pulmonary effort is normal.      Breath sounds: Normal breath sounds.   Abdominal:      General: Abdomen is flat. Bowel sounds are normal.      Tenderness: There is no abdominal tenderness. There is no guarding.   Musculoskeletal:         General: Tenderness and signs of injury (R temporal area) present.      Right lower leg: No edema.      Left lower leg: No edema.   Skin:     General: Skin is warm and dry.      Capillary Refill:  "Capillary refill takes less than 2 seconds.      Coloration: Skin is not jaundiced.   Neurological:      General: No focal deficit present.      Mental Status: He is alert. He is confused.   Psychiatric:         Mood and Affect: Mood normal.                Significant Labs: All pertinent labs within the past 24 hours have been reviewed.  CBC:   Recent Labs   Lab 01/09/25  1113   WBC 8.80   HGB 12.7*   HCT 41.3        CMP:   Recent Labs   Lab 01/09/25  1113      K 4.6      CO2 25   GLU 76   BUN 28*   CREATININE 1.5*   CALCIUM 9.0   PROT 6.8   ALBUMIN 3.4*   BILITOT 0.7   ALKPHOS 27*   AST 19   ALT 14   ANIONGAP 8       Significant Imaging: I have reviewed all pertinent imaging results/findings within the past 24 hours.      Assessment/Plan:     * Fall at home  Impaired mobility  Posture imbalance  Bradycardia  AFib  HTN  Insomnia    Suspect polypharmacy.    Place in Obs  Neuro checks q4h  Holding several of his many medications, including Eliquis 5 for 24h in setting of head trauma, Imdur, Halcion  PT OT        Hx of CABG  HLD    Continue home statin      Type 2 diabetes mellitus with peripheral neuropathy  Last A1c reviewed-   Lab Results   Component Value Date    HGBA1C 6.3 (H) 12/30/2024     Home antihyperglycemic regimen: metformin, Ozempic, amaryl    No results for input(s): "POCTGLUCOSE" in the last 72 hours.  Most recent inpatient antihyperglycemic regimen:   Antihyperglycemics (From admission, onward)      Start     Stop Route Frequency Ordered    01/10/25 0124  insulin aspart U-100 pen 0-5 Units         -- SubQ Every 4 hours PRN 01/10/25 0024          Goal blood glucose range while admitted: 140-180 per the NICE-SUGAR trial and American Diabetes Association guidelines  Diabetic diet 2000 jim  Rechecking A1c if most recent result above > 6 months ago  Diabetic counseling and education (eg nutrition, insulin) to be given prior to discharge              VTE Risk Mitigation (From admission, " onward)           Ordered     Reason for No Pharmacological VTE Prophylaxis  Once        Question:  Reasons:  Answer:  Risk of Bleeding    01/09/25 1911     IP VTE HIGH RISK PATIENT  Once         01/09/25 1911     Place sequential compression device  Until discontinued         01/09/25 1911                       On 01/10/2025, patient should be placed in hospital observation services under my care.             Agustin Brooks MD  Department of Hospital Medicine  [facility] - Emergency Dept

## 2025-01-10 NOTE — ED NOTES
Pt yelling to turn off the monitor because of the beeping. Pt stating that he wants to call his wife and leave. Provided pt spec line to call wife. Call light within reach, side rails x2.

## 2025-01-10 NOTE — PLAN OF CARE
Discharge Recommendations: Low Intensity and Rolling Walker   Problem: Physical Therapy  Goal: Physical Therapy Goal  Description: Goals to be met by: 25     Patient will increase functional independence with mobility by performin. Supine to sit with Modified [place]  2. Sit to supine with Modified [place]  3. Gait  x 150 feet with Modified [place] using Rolling Walker.     Outcome: Progressing

## 2025-01-10 NOTE — PT/OT/SLP EVAL
Occupational Therapy   Evaluation    Name: Driss Hernandez Jr.  MRN: [id number]  Admitting Diagnosis: Encephalopathy  Recent Surgery: * No surgery found *      Recommendations:     Discharge Recommendations: Low Intensity Therapy  Discharge Equipment Recommendations:  walker, rolling  Barriers to discharge:  None    Assessment:     Driss Hernandez Jr. is a 74 y.o. male with a medical diagnosis of Encephalopathy.  He presents with The primary encounter diagnosis was Syncope and collapse. Diagnoses of Fall, Altered mental status, Acute midline low back pain without sciatica, Altered mental status, unspecified altered mental status type, Back pain, Chest pain, and Syncope were also pertinent to this visit. . Performance deficits affecting function: weakness, impaired endurance, impaired self care skills, impaired functional mobility, decreased lower extremity function, decreased upper extremity function, decreased ROM, impaired skin, impaired cardiopulmonary response to activity.      OT initial eval completed. Pt presenting with increased pain, need of assist, and elevated BP from baseline and would benefit from skilled OT to maximize function prior to d/c. Recommend LIT once medically stable and RW.     Rehab Prognosis: Good; patient would benefit from acute skilled OT services to address these deficits and reach maximum level of function.       Plan:     Patient to be seen 3 x/week to address the above listed problems via self-care/home management, therapeutic activities, therapeutic exercises  Plan of Care Expires: 01/24/25  Plan of Care Reviewed with: patient, spouse    Subjective     Chief Complaint: pain  Patient/Family Comments/goals: spouse present and reports she thinks he is slightly confused since he asks the same questions repeatedly    Occupational Profile:  Living Environment: Lives with spouse, [facility], 1STE, WIS  Previous level of function: Mod I with no AD  Equipment Used at Home: none  Assistance  upon Discharge: spouse    Pain/Comfort:  Pain Rating 1: 5/10 (LBP)  Pain Addressed 1: Reposition, Distraction    Objective:     Communicated with: RN prior to session.  Patient found HOB elevated with blood pressure cuff upon OT entry to room.    General Precautions: Standard, fall  Orthopedic Precautions: spinal precautions  Braces: N/A  Respiratory Status: Room air    Occupational Performance:    Bed Mobility:  vc/tcs on log roll technique  Patient completed Rolling/Turning to Right with contact guard assistance  Patient completed Scooting/Bridging with supervision  Patient completed Supine to Sit with minimum assistance  Patient completed Sit to Supine with minimum assistance    Functional Mobility/Transfers:  Patient completed Sit <> Stand Transfer with supervision  with  rolling walker   Functional Mobility: Pt performed side steps with RW and SBA. Vc/tcs on safe t/f technique, RW sequencing, hand placement, and R/L differentiation. BP monitored: 199/89 at rest, 209/88 seated eob, 195/70 once in supine    Activities of Daily Living:  Lower Body Dressing: maximal assistance to don socks    Cognitive/Visual Perceptual:  Cognitive/Psychosocial Skills:     -       Oriented to: Person, Place, Time, and Situation   -       Follows Commands/attention:Follows one-step commands and Follows two-step commands  -       Communication: clear/fluent  -       Memory: Impaired STM  -       Safety awareness/insight to disability: intact   -       Mood/Affect/Coping skills/emotional control: Cooperative  Visual/Perceptual:      -Intact  acuity      Physical Exam:  Balance:    -       Fair+  Skin integrity: bandage to incision site intact  Dominant hand:    -       R  Upper Extremity Range of Motion:     -       Right Upper Extremity: WFL to 90 deg shldr flex  -       Left Upper Extremity: WFL to 90 deg shldr flex  Upper Extremity Strength:    -       Right Upper Extremity: WFL distally  -       Left Upper Extremity: WFL  distally   Strength:    -       Right Upper Extremity: WFL  -       Left Upper Extremity: WFL  Gross motor coordination:   WFL    AMPAC 6 Click ADL:  AMPAC Total Score: 21    Treatment & Education:  Patient educated on role of OT/ POC development. Co-eval with PT to maximize function and safety. Occupational profile developed via interview. Patient guided to transition eob for assessment. Initiated ADL / functional mobility training as above with instruction on use of RW at all times for safe mobility and for spinal precaution adherence during bed mobility with cues on body mechanics and joint protection. Educated patient on importance of out of bed mobility, movement/repositioning throughout the day, and call button use. Answered questions within scope, and all needs met.     Patient left HOB elevated with all lines intact, call button in reach, RN notified, and spouse present    GOALS:   Multidisciplinary Problems       Occupational Therapy Goals          Problem: Occupational Therapy    Goal Priority Disciplines Outcome Interventions   Occupational Therapy Goal     OT, PT/OT Progressing    Description: Goals to be met by: 1/24/2025     Patient will increase functional independence with ADLs by performing:    UE Dressing with Modified [place].  LE Dressing with Modified [place].  Grooming while standing at sink with Modified [place].  Toileting from toilet with Modified [place] for hygiene and clothing management.   Step transfer with Modified [place]  Toilet transfer to toilet with Modified [place].  100% reciprocation and integration of 3/3 spinal precautions, DME recommendations, and ADL/task modifications post-sx to support safe d/c at highest level of function.                           History:     Past Medical History:   Diagnosis Date    Chronic heart failure with preserved ejection fraction 2/9/2023    Chronic midline low back pain without sciatica 10/2/2017    Colon  polyps     Coronary artery disease involving native coronary artery of native heart 3/5/2020    Coronary artery disease involving native coronary artery of native heart with angina pectoris 12/10/2020    Diabetes mellitus with neurological manifestations, uncontrolled 1/24/2017    Diabetic polyneuropathy associated with type 2 diabetes mellitus 1/24/2017    Diabetic polyneuropathy associated with type 2 diabetes mellitus     Encephalopathy 1/10/2025    Essential hypertension 1/24/2017    Gastroesophageal reflux disease 1/24/2017    Hyperlipidemia 1/24/2017    Insomnia 1/24/2017    Long-term insulin use 1/24/2017    Longstanding persistent atrial fibrillation 12/30/2020    Lumbar spondylosis 11/13/2017    Nuclear sclerosis of both eyes 8/24/2017    Obesity     PAD (peripheral artery disease) 3/23/2022    Stage 3 chronic kidney disease 2/13/2019    Uncontrolled type 2 diabetes mellitus without complication, with long-term current use of insulin 1/24/2017         Past Surgical History:   Procedure Laterality Date    ARTHROSCOPIC REPAIR OF ROTATOR CUFF OF SHOULDER Right 7/5/2022    Procedure: REPAIR, ROTATOR CUFF, ARTHROSCOPIC;  Surgeon: Valerie Olivera MD;  Location: [facility] OR;  Service: Orthopedics;  Laterality: Right;  GUADALUPE AND NEPHJENNIFER LEMUS [phone]/ TEXTED ALLAN ON 6/23/2022 @ 9:47AM. ALLAN RESPONDED ON 6/23/2022 @ 10:33AM-  JEAN PAUL BABIN [phone] MY BE HERE FOR CASE SPOKE TO HIM @ 9:59AM ON 7-1-2022  RN PREOP 6/28/2022    BACK SURGERY      2002    CATARACT EXTRACTION W/  INTRAOCULAR LENS IMPLANT Right 08/29/2017    Dr. Hong    CATARACT EXTRACTION W/  INTRAOCULAR LENS IMPLANT Left 02/24/2022    Dr. Hong    CAUDAL EPIDURAL STEROID INJECTION N/A 9/25/2024    Procedure: Caudal Epidural Steroid Injection;  Surgeon: Eze Byrd Jr., MD;  Location: [facility] PAIN MANAGEMENT;  Service: Pain Management;  Laterality: N/A;  @1100(prev. 715)  Eliquis last 9/21  Plavix last 9/19  Check  BG  E-Consent    COLONOSCOPY N/A 2/21/2017    Procedure: COLONOSCOPY;  Surgeon: Robb Rosado MD;  Location: [facility] ENDO;  Service: Endoscopy;  Laterality: N/A;    COLONOSCOPY N/A 7/5/2023    Procedure: COLONOSCOPY;  Surgeon: Aston Varela MD;  Location: [facility] ENDO;  Service: Endoscopy;  Laterality: N/A;  Referral: JOVANNI SCANLON  ok to hold Plavix 5 days and Eliquis 2 days per Dr Liliana MUJICA Meds  Suprep   Inst portal   LW    CORONARY ANGIOGRAPHY INCLUDING BYPASS GRAFTS WITH CATHETERIZATION OF LEFT HEART N/A 11/11/2020    Procedure: ANGIOGRAM, CORONARY, INCLUDING BYPASS GRAFT, WITH LEFT HEART CATHETERIZATION;  Surgeon: Lane Cuellar MD;  Location: [facility] CATH LAB;  Service: Cardiology;  Laterality: N/A;  RN PRE OP 11-5-2020. --COVID NEGATIVE ON  11-. C A    CORONARY ARTERY BYPASS GRAFT      March 2016    EPIDURAL STEROID INJECTION Bilateral 11/14/2018    Procedure: Lumbar Medial Branch Blocks;  Surgeon: Eze Byrd Jr., MD;  Location: [facility];  Service: Pain Management;  Laterality: Bilateral;  Bilateral Lumbar Medial Branch Blocks L2, L3, L4, L5    12167  60681    Arrive @ 1150; NO Sedation    EPIDURAL STEROID INJECTION Bilateral 11/28/2018    Procedure: Injection, Steroid, Epidural;  Surgeon: Eze Byrd Jr., MD;  Location: [facility];  Service: Pain Management;  Laterality: Bilateral;  Bilateral Sacroiliac Joint Steroid Injections    45249    Arrive @ 0910    EPIDURAL STEROID INJECTION Bilateral 3/20/2019    Procedure: Injection, Steroid, Sacroiliiac Joint;  Surgeon: Eze Byrd Jr., MD;  Location: [facility];  Service: Pain Management;  Laterality: Bilateral;  Bilateral Sacroiliac Joint Steroid Injections    70865    Arrive @ 1145; Trigger point injections also?    EPIDURAL STEROID INJECTION Right 8/2/2023    Procedure: Right L5 Transforaminal Epidural Steroid Injection;  Surgeon: Eze Byrd Jr., MD;  Location: [facility];  Service: Pain Management;   Laterality: Right;  @0830 (given)  Eliquis last   Plavix last   Check BG    EPIDURAL STEROID INJECTION Right 10/11/2023    Procedure: Right L3 (infraneural) + S1 Transforaminal Epidural Steroid Injections;  Surgeon: Eze Byrd Jr., MD;  Location: [facility] ENDO;  Service: Pain Management;  Laterality: Right;  @0745 (requests morning)  Eliquis 10/7 & Plavix 10/5  Check BG    ESOPHAGOGASTRODUODENOSCOPY N/A 2023    Procedure: EGD (ESOPHAGOGASTRODUODENOSCOPY);  Surgeon: Anita Oswald MD;  Location: [facility] ENDO;  Service: Endoscopy;  Laterality: N/A;  Procedure Timin-4 weeks  Provider: Any GI provider  Location: No Preference  Additional Scheduling Information: Blood thinners  Prep Specifications:N/A   ref. by Dr. Light, instr. to portal, ,  meds-st  ok to hold Plavix 5 days and Eliquis 2 day    INTRAOCULAR PROSTHESES INSERTION Left 2022    Procedure: INSERTION, IOL PROSTHESIS;  Surgeon: Nickolas Hong MD;  Location: [facility] OR;  Service: Ophthalmology;  Laterality: Left;    LAMINOFORAMINOTOMY OF SPINE USING MINIMALLY INVASIVE TECHNIQUE Right 2025    Procedure: LAMINOFORAMINOTOMY, SPINE, MINIMALLY INVASIVE;  Surgeon: Luis M Ellis MD;  Location: [facility] OR;  Service: Neurosurgery;  Laterality: Right;  Right L4/5 minimally invasive laminotomy/microdiscectomy. silvana table, lucio frame, fluoro, microscope, no vendor, no monitoring  CLEARED BY CARDS AND PCP   RN PREOP 24---T/S--done -----NEED ORDERS    PHACOEMULSIFICATION OF CATARACT Left 2022    Procedure: PHACOEMULSIFICATION, CATARACT;  Surgeon: Nickolas Hong MD;  Location: [facility] OR;  Service: Ophthalmology;  Laterality: Left;  RN Phone Pre Op 22.  Covid NEGATIVE  22.  Arrival 08:00 am.    TONSILLECTOMY         Time Tracking:     OT Date of Treatment: 01/10/25  OT Start Time: 09  OT Stop Time: 1004  OT Total Time (min): 21 min    Billable Minutes:Evaluation 21    1/10/2025

## 2025-01-10 NOTE — ASSESSMENT & PLAN NOTE
Impaired mobility  Posture imbalance  Bradycardia  AFib  HTN  Insomnia    Suspect polypharmacy.    Place in Obs  Neuro checks q4h  Holding several of his many medications, including Eliquis 5 for 24h in setting of head trauma, Imdur, Halcion  PT OT

## 2025-01-10 NOTE — ED NOTES
Attempted to assist patient to get dressed for discharge. Pt yelling in pain with sitting up and with attempting to stand. Pt unable to stand due to the pain in his back. MD notified who states she will assess pt again before discharge. Charge  RN also notified of discharge delay.

## 2025-01-10 NOTE — NURSING
Pt continues to argue with staff, wanting to get out of bed to leave. Educated pt multiple times of fall risk preventions. Pt want to call family to leave.

## 2025-01-10 NOTE — HPI
Driss Hernandez Jr. is a 74 y.o. male with  Several spinal and balance issues, Chronic pain on oxycodone, Non-Insulin-dependent DM2 with neuropathic complications, CAD s/p CABG, HTN, HLD, AFib on Eliquis, and CKD 3  who presented to [facility] ED on 01/09/2025 for eval and treatment of a fall and near syncopal event.  Per EMS, pt was at home using the bathroom when he felt light-headed and had a syncopal episode. He was found by a family member on the ground and has been confused since the fall. Reports pt was hypertensive with /100 but vitals otherwise normal. Pt reports he sustained a small wound on his abdomen. He states he feels a little confused, but denies any headache, nausea, vomiting, CP, SOB, leg pain or numbness, neck pain, dysuria, visual disturbances, speech difficulty, hearing loss, cough, eye pain, sore throat, rhinorrhea, fever or other associated symptoms. He does report some back pain at his surgical site.    ED course notable for the following: Confused, and kept trying to stand up to leave.  Hypertensive and bradycardic to the 40s.  Exam with abrasion on forehead.  Labs BUN 28 Crt 1.5 eGFR 48 UA w hyaline casts.  EKG w bradycardia and AFib w slow ventricular response.  Imaging: CTH without acute intracranial abnormality.  ED therapy/stabilization measures: pain control.  They were placed in observation under the care of [facility] for further evaluation and treatment.

## 2025-01-10 NOTE — SUBJECTIVE & OBJECTIVE
Interval History: feels well, unsure of the events that brought him to the hospital.     Review of Systems   Unable to perform ROS: Mental status change     Objective:     Vital Signs (Most Recent):  Temp: 97.8 °F (36.6 °C) (01/10/25 0745)  Pulse: (!) 51 (01/10/25 0722)  Resp: (!) 22 (01/10/25 0618)  BP: (!) 199/83 (01/10/25 0722)  SpO2: 97 % (01/10/25 0722) Vital Signs (24h Range):  Temp:  [97.5 °F (36.4 °C)-98.6 °F (37 °C)] 97.8 °F (36.6 °C)  Pulse:  [47-61] 51  Resp:  [15-22] 22  SpO2:  [92 %-99 %] 97 %  BP: (136-205)/() 199/83     Weight: 79.4 kg (175 lb)  Body mass index is 27.41 kg/m².    Intake/Output Summary (Last 24 hours) at 1/10/2025 0903  Last data filed at 1/10/2025 0622  Gross per 24 hour   Intake 2000 ml   Output 200 ml   Net 1800 ml         Physical Exam  Vitals and nursing note reviewed.   Constitutional:       General: He is not in acute distress.     Appearance: He is well-developed. He is not diaphoretic.   HENT:      Head: Normocephalic and atraumatic.      Right Ear: External ear normal.      Left Ear: External ear normal.   Eyes:      General:         Right eye: No discharge.         Left eye: No discharge.      Conjunctiva/sclera: Conjunctivae normal.   Neck:      Thyroid: No thyromegaly.   Cardiovascular:      Rate and Rhythm: Normal rate and regular rhythm.      Heart sounds: No murmur heard.  Pulmonary:      Effort: Pulmonary effort is normal. No respiratory distress.      Breath sounds: Normal breath sounds.   Abdominal:      General: Bowel sounds are normal. There is no distension.      Palpations: Abdomen is soft. There is no mass.      Tenderness: There is no abdominal tenderness.   Musculoskeletal:         General: No deformity.      Cervical back: Normal range of motion and neck supple.   Skin:     General: Skin is warm and dry.   Neurological:      Mental Status: He is alert.      Sensory: No sensory deficit.      Comments: Symmetric movement of BUE and BLE against gravity no  facial droop or slurred speech. Oriented to wife, self, birthday. Not oriented to year or place.    Psychiatric:         Behavior: Behavior normal.             Significant Labs: CBC:   Recent Labs   Lab 01/09/25  1113 01/10/25  0328   WBC 8.80 7.76   HGB 12.7* 13.4*   HCT 41.3 42.8    210     CMP:   Recent Labs   Lab 01/09/25  1113 01/10/25  0328    140   K 4.6 4.5    111*   CO2 25 20*   GLU 76 76   BUN 28* 24*   CREATININE 1.5* 1.1   CALCIUM 9.0 9.4   PROT 6.8 6.4   ALBUMIN 3.4* 3.3*   BILITOT 0.7 0.8   ALKPHOS 27* 26*   AST 19 16   ALT 14 14   ANIONGAP 8 9       Significant Imaging:   Imaging Results              CT Lumbar Spine Without Contrast (Final result)  Result time 01/09/25 16:33:02      Final result by Manas Sorenson MD (01/09/25 16:33:02)                   Impression:      No CT evidence of lumbar spine acute osseous traumatic injury.    Lumbar spondylosis most prominent at L2-3, L4-5 and L5-S1 levels, as further outlined in the body of the report.    Other incidental/nonemergent findings in the body of the report.      Electronically signed by: Mnaas Sorenson MD  Date:    01/09/2025  Time:    16:33               Narrative:    EXAMINATION:  CT LUMBAR SPINE WITHOUT CONTRAST    CLINICAL HISTORY:  Low back pain, trauma;    TECHNIQUE:  Low-dose axial, sagittal and coronal reformations are obtained through the lumbar spine.  Contrast was not administered.    COMPARISON:  Lumbar spine series 12/12/2024, MRI lumbar spine 07/17/2023, CT abdomen and pelvis 06/24/2023    FINDINGS:  Five non-rib-bearing lumbar type vertebral bodies.  Mild straightening of the usual lumbar lordosis.  Similar chronic minimal anterior wedge deformity of L1 vertebral body.  No evidence for acute vertebral compression fracture.  No displaced fracture, dislocation or significant listhesis.  Grossly stable nonspecific subcentimeter lucent lesion at the right iliac body near the SI joint.  No destructive osseous process.   No pars defect.  Multilevel degenerative disc disease with loss of disc height, endplate changes, marginal osteophytes with posterior disc osteophyte complexes and facet arthrosis most prominent at L4-5 level, not significantly progressed from most previous imaging.  Suspected multilevel small Schmorl's nodes, similar to prior.  No significant prevertebral soft tissue thickening.  No paraspinal mass or fluid collection.  No subcutaneous emphysema or radiopaque foreign body.    L1-2: Minimal bilateral neural foraminal narrowing, more so on the right.  No significant spinal canal stenosis.    L2-3: Posterior broad-based disc bulge resulting in mild acquired canal stenosis.  Mild bilateral neural foraminal narrowing, right more so than left.    L3-4: Posterior broad-based disc bulge resulting in minimal acquired canal stenosis.  Mild right and minimal left neural foraminal narrowing.    L4-5: Posterior disc osteophyte complex resulting in at least moderate acquired canal stenosis.  Moderate bilateral neural foraminal narrowing, left more so than right.    L5-S1: Posterior disc osteophyte complex asymmetric to the right resulting in minimal acquired canal stenosis.  Moderate to severe bilateral foraminal narrowing, left more so than right.    Scattered atherosclerotic vascular calcifications.  No acute abnormality seen within the imaged abdomen or pelvis.  Age-related mild to moderate degenerative change at the bilateral SI joints.                                       CT Head Without Contrast (Final result)  Result time 01/09/25 12:09:59      Final result by Rivera Raza MD (01/09/25 12:09:59)                   Impression:      1. No definite acute intracranial findings by noncontrast CT.  2. Partially imaged soft tissue lesions within the superficial lobes of the bilateral parotid glands, with differential considerations to include intraparotid lymph nodes versus benign or malignant primary parotid neoplasms.   Further characterization with ultrasound and possible tissue sampling would be recommended.      Electronically signed by: Rivera Raza  Date:    01/09/2025  Time:    12:09               Narrative:    EXAMINATION:  CT HEAD WITHOUT CONTRAST    CLINICAL HISTORY:  Mental status change, unknown cause; Altered mental status, unspecified    TECHNIQUE:  Low dose axial images were obtained through the head.  Coronal and sagittal reformations were also performed. Contrast was not administered.    COMPARISON:  MRI of the brain performed 12/20/2024.    FINDINGS:  Blood: No acute intracranial hemorrhage.    Parenchyma: No definite loss of gray-white differentiation to suggest acute or subacute transcortical infarct. Generalized pattern of age-related parenchymal volume loss.  Nonspecific areas of white matter hypoattenuation may reflect sequela of chronic small vessel ischemic disease.    Ventricles/Extra-axial spaces: No abnormal extra-axial fluid collection. Basal cisterns are patent.    Vessels: Moderate atherosclerotic calcifications.    Orbits: Status post bilateral lens replacements.    Scalp: Unremarkable.    Skull: There are no depressed skull fractures or destructive bone lesions.  Chronic appearing nasal bone deformity.    Sinuses and mastoids: Relatively minimal paranasal sinus mucosal thickening with small retention cysts.    Other findings: Partially imaged ovoid soft tissue lesions in the superficial lobes of bilateral parotid glands, measuring up to 11 mm on the right and 12 mm on the left (for example as seen on series 3, image 1).

## 2025-01-11 ENCOUNTER — ANESTHESIA (OUTPATIENT)
Dept: INTERVENTIONAL RADIOLOGY/VASCULAR | Facility: HOSPITAL | Age: 75
End: 2025-01-11
Payer: MEDICARE

## 2025-01-11 PROBLEM — I63.89 ARTERIAL ISCHEMIC STROKE, MULTIFOCAL, MULTIPLE VASCULAR TERRITORIES, ACUTE: Status: ACTIVE | Noted: 2025-01-11

## 2025-01-11 PROBLEM — I63.411 STROKE DUE TO EMBOLISM OF RIGHT MIDDLE CEREBRAL ARTERY: Status: ACTIVE | Noted: 2025-01-11

## 2025-01-11 PROBLEM — I27.21 PULMONARY ARTERY HYPERTENSION: Status: ACTIVE | Noted: 2025-01-11

## 2025-01-11 PROBLEM — I63.9 CVA (CEREBRAL VASCULAR ACCIDENT): Status: ACTIVE | Noted: 2025-01-11

## 2025-01-11 LAB
ABO + RH BLD: NORMAL
ALBUMIN SERPL BCP-MCNC: 3.1 G/DL (ref 3.5–5.2)
ALLENS TEST: ABNORMAL
ALP SERPL-CCNC: 29 U/L (ref 40–150)
ALT SERPL W/O P-5'-P-CCNC: 14 U/L (ref 10–44)
ANION GAP SERPL CALC-SCNC: 9 MMOL/L (ref 8–16)
APTT PPP: 29.5 SEC (ref 21–32)
AST SERPL-CCNC: 19 U/L (ref 10–40)
BACTERIA #/AREA URNS AUTO: NORMAL /HPF
BASOPHILS # BLD AUTO: 0.02 K/UL (ref 0–0.2)
BASOPHILS NFR BLD: 0.3 % (ref 0–1.9)
BILIRUB SERPL-MCNC: 1.1 MG/DL (ref 0.1–1)
BILIRUB UR QL STRIP: NEGATIVE
BLD GP AB SCN CELLS X3 SERPL QL: NORMAL
BUN SERPL-MCNC: 21 MG/DL (ref 8–23)
CALCIUM SERPL-MCNC: 9.4 MG/DL (ref 8.7–10.5)
CHLORIDE SERPL-SCNC: 107 MMOL/L (ref 95–110)
CHOLEST SERPL-MCNC: 125 MG/DL (ref 120–199)
CHOLEST/HDLC SERPL: 4.6 {RATIO} (ref 2–5)
CLARITY UR REFRACT.AUTO: CLEAR
CO2 SERPL-SCNC: 21 MMOL/L (ref 23–29)
COLOR UR AUTO: YELLOW
CREAT SERPL-MCNC: 1.1 MG/DL (ref 0.5–1.4)
DELSYS: ABNORMAL
DIFFERENTIAL METHOD BLD: ABNORMAL
EOSINOPHIL # BLD AUTO: 0.4 K/UL (ref 0–0.5)
EOSINOPHIL NFR BLD: 6 % (ref 0–8)
ERYTHROCYTE [DISTWIDTH] IN BLOOD BY AUTOMATED COUNT: 14.6 % (ref 11.5–14.5)
EST. GFR  (NO RACE VARIABLE): >60 ML/MIN/1.73 M^2
ESTIMATED AVG GLUCOSE: 128 MG/DL (ref 68–131)
GLUCOSE SERPL-MCNC: 72 MG/DL (ref 70–110)
GLUCOSE UR QL STRIP: ABNORMAL
HBA1C MFR BLD: 6.1 % (ref 4–5.6)
HCO3 UR-SCNC: 20 MMOL/L (ref 24–28)
HCT VFR BLD AUTO: 40.9 % (ref 40–54)
HDLC SERPL-MCNC: 27 MG/DL (ref 40–75)
HDLC SERPL: 21.6 % (ref 20–50)
HGB BLD-MCNC: 12.7 G/DL (ref 14–18)
HGB UR QL STRIP: NEGATIVE
IMM GRANULOCYTES # BLD AUTO: 0.02 K/UL (ref 0–0.04)
IMM GRANULOCYTES NFR BLD AUTO: 0.3 % (ref 0–0.5)
INR PPP: 1.2 (ref 0.8–1.2)
KETONES UR QL STRIP: ABNORMAL
LDLC SERPL CALC-MCNC: 82.4 MG/DL (ref 63–159)
LEUKOCYTE ESTERASE UR QL STRIP: NEGATIVE
LYMPHOCYTES # BLD AUTO: 1.1 K/UL (ref 1–4.8)
LYMPHOCYTES NFR BLD: 17.9 % (ref 18–48)
MAGNESIUM SERPL-MCNC: 1.6 MG/DL (ref 1.6–2.6)
MCH RBC QN AUTO: 26 PG (ref 27–31)
MCHC RBC AUTO-ENTMCNC: 31.1 G/DL (ref 32–36)
MCV RBC AUTO: 84 FL (ref 82–98)
MICROSCOPIC COMMENT: NORMAL
MODE: ABNORMAL
MONOCYTES # BLD AUTO: 0.6 K/UL (ref 0.3–1)
MONOCYTES NFR BLD: 9.1 % (ref 4–15)
NEUTROPHILS # BLD AUTO: 4.2 K/UL (ref 1.8–7.7)
NEUTROPHILS NFR BLD: 66.4 % (ref 38–73)
NITRITE UR QL STRIP: NEGATIVE
NONHDLC SERPL-MCNC: 98 MG/DL
NRBC BLD-RTO: 0 /100 WBC
PCO2 BLDA: 34.7 MMHG (ref 35–45)
PH SMN: 7.37 [PH] (ref 7.35–7.45)
PH UR STRIP: 6 [PH] (ref 5–8)
PHOSPHATE SERPL-MCNC: 3.8 MG/DL (ref 2.7–4.5)
PLATELET # BLD AUTO: 215 K/UL (ref 150–450)
PMV BLD AUTO: 9.6 FL (ref 9.2–12.9)
PO2 BLDA: 86 MMHG (ref 80–100)
POC BE: -4 MMOL/L
POC SATURATED O2: 96 % (ref 95–100)
POC TCO2: 21 MMOL/L (ref 23–27)
POCT GLUCOSE: 81 MG/DL (ref 70–110)
POCT GLUCOSE: 83 MG/DL (ref 70–110)
POCT GLUCOSE: 90 MG/DL (ref 70–110)
POCT GLUCOSE: 94 MG/DL (ref 70–110)
POTASSIUM SERPL-SCNC: 4.5 MMOL/L (ref 3.5–5.1)
PROT SERPL-MCNC: 6.4 G/DL (ref 6–8.4)
PROT UR QL STRIP: ABNORMAL
PROTHROMBIN TIME: 13.1 SEC (ref 9–12.5)
RBC # BLD AUTO: 4.88 M/UL (ref 4.6–6.2)
RBC #/AREA URNS AUTO: 2 /HPF (ref 0–4)
SAMPLE: ABNORMAL
SITE: ABNORMAL
SODIUM SERPL-SCNC: 137 MMOL/L (ref 136–145)
SP GR UR STRIP: >1.03 (ref 1–1.03)
SPECIMEN OUTDATE: NORMAL
SQUAMOUS #/AREA URNS AUTO: 0 /HPF
TRIGL SERPL-MCNC: 78 MG/DL (ref 30–150)
URN SPEC COLLECT METH UR: ABNORMAL
WBC # BLD AUTO: 6.38 K/UL (ref 3.9–12.7)
WBC #/AREA URNS AUTO: 1 /HPF (ref 0–5)
YEAST UR QL AUTO: NORMAL

## 2025-01-11 PROCEDURE — 85730 THROMBOPLASTIN TIME PARTIAL: CPT | Mod: HCNC

## 2025-01-11 PROCEDURE — 20000000 HC ICU ROOM: Mod: HCNC

## 2025-01-11 PROCEDURE — 93010 ELECTROCARDIOGRAM REPORT: CPT | Mod: HCNC,,, | Performed by: INTERNAL MEDICINE

## 2025-01-11 PROCEDURE — B313YZZ FLUOROSCOPY OF RIGHT COMMON CAROTID ARTERY USING OTHER CONTRAST: ICD-10-PCS | Performed by: STUDENT IN AN ORGANIZED HEALTH CARE EDUCATION/TRAINING PROGRAM

## 2025-01-11 PROCEDURE — 83735 ASSAY OF MAGNESIUM: CPT | Mod: HCNC

## 2025-01-11 PROCEDURE — 80061 LIPID PANEL: CPT | Mod: HCNC

## 2025-01-11 PROCEDURE — 36415 COLL VENOUS BLD VENIPUNCTURE: CPT | Mod: HCNC

## 2025-01-11 PROCEDURE — 25000003 PHARM REV CODE 250: Mod: HCNC

## 2025-01-11 PROCEDURE — 83036 HEMOGLOBIN GLYCOSYLATED A1C: CPT | Mod: HCNC

## 2025-01-11 PROCEDURE — 63600175 PHARM REV CODE 636 W HCPCS: Mod: HCNC

## 2025-01-11 PROCEDURE — 81001 URINALYSIS AUTO W/SCOPE: CPT | Mod: HCNC

## 2025-01-11 PROCEDURE — 99291 CRITICAL CARE FIRST HOUR: CPT | Mod: HCNC,,,

## 2025-01-11 PROCEDURE — G0427 INPT/ED TELECONSULT70: HCPCS | Mod: HCNC,95,, | Performed by: STUDENT IN AN ORGANIZED HEALTH CARE EDUCATION/TRAINING PROGRAM

## 2025-01-11 PROCEDURE — D9220A PRA ANESTHESIA: Mod: HCNC,ANES,, | Performed by: STUDENT IN AN ORGANIZED HEALTH CARE EDUCATION/TRAINING PROGRAM

## 2025-01-11 PROCEDURE — B316YZZ FLUOROSCOPY OF RIGHT INTERNAL CAROTID ARTERY USING OTHER CONTRAST: ICD-10-PCS | Performed by: STUDENT IN AN ORGANIZED HEALTH CARE EDUCATION/TRAINING PROGRAM

## 2025-01-11 PROCEDURE — 99900035 HC TECH TIME PER 15 MIN (STAT): Mod: HCNC

## 2025-01-11 PROCEDURE — 25500020 PHARM REV CODE 255: Mod: HCNC | Performed by: HOSPITALIST

## 2025-01-11 PROCEDURE — 99291 CRITICAL CARE FIRST HOUR: CPT | Mod: HCNC,GC,, | Performed by: STUDENT IN AN ORGANIZED HEALTH CARE EDUCATION/TRAINING PROGRAM

## 2025-01-11 PROCEDURE — 99285 EMERGENCY DEPT VISIT HI MDM: CPT | Mod: 25,HCNC

## 2025-01-11 PROCEDURE — D9220A PRA ANESTHESIA: Mod: HCNC,CRNA,, | Performed by: NURSE ANESTHETIST, CERTIFIED REGISTERED

## 2025-01-11 PROCEDURE — 99499 UNLISTED E&M SERVICE: CPT | Mod: HCNC,,, | Performed by: PSYCHIATRY & NEUROLOGY

## 2025-01-11 PROCEDURE — 86900 BLOOD TYPING SEROLOGIC ABO: CPT | Mod: HCNC

## 2025-01-11 PROCEDURE — 36600 WITHDRAWAL OF ARTERIAL BLOOD: CPT | Mod: HCNC

## 2025-01-11 PROCEDURE — 85025 COMPLETE CBC W/AUTO DIFF WBC: CPT | Mod: HCNC

## 2025-01-11 PROCEDURE — 84100 ASSAY OF PHOSPHORUS: CPT | Mod: HCNC

## 2025-01-11 PROCEDURE — 25000003 PHARM REV CODE 250: Mod: HCNC | Performed by: HOSPITALIST

## 2025-01-11 PROCEDURE — 99447 NTRPROF PH1/NTRNET/EHR 11-20: CPT | Mod: HCNC,,, | Performed by: PSYCHIATRY & NEUROLOGY

## 2025-01-11 PROCEDURE — 82803 BLOOD GASES ANY COMBINATION: CPT | Mod: HCNC

## 2025-01-11 PROCEDURE — 25000242 PHARM REV CODE 250 ALT 637 W/ HCPCS: Mod: HCNC

## 2025-01-11 PROCEDURE — 93005 ELECTROCARDIOGRAM TRACING: CPT | Mod: HCNC

## 2025-01-11 PROCEDURE — 25000003 PHARM REV CODE 250: Mod: HCNC | Performed by: STUDENT IN AN ORGANIZED HEALTH CARE EDUCATION/TRAINING PROGRAM

## 2025-01-11 PROCEDURE — 25500020 PHARM REV CODE 255: Mod: HCNC | Performed by: EMERGENCY MEDICINE

## 2025-01-11 PROCEDURE — 63600175 PHARM REV CODE 636 W HCPCS: Mod: HCNC | Performed by: STUDENT IN AN ORGANIZED HEALTH CARE EDUCATION/TRAINING PROGRAM

## 2025-01-11 PROCEDURE — 80053 COMPREHEN METABOLIC PANEL: CPT | Mod: HCNC

## 2025-01-11 PROCEDURE — 85610 PROTHROMBIN TIME: CPT | Mod: HCNC

## 2025-01-11 PROCEDURE — 25000003 PHARM REV CODE 250: Mod: HCNC | Performed by: PHYSICIAN ASSISTANT

## 2025-01-11 PROCEDURE — 94761 N-INVAS EAR/PLS OXIMETRY MLT: CPT | Mod: HCNC,XB

## 2025-01-11 RX ORDER — SODIUM CHLORIDE 0.9 % (FLUSH) 0.9 %
10 SYRINGE (ML) INJECTION
Status: DISCONTINUED | OUTPATIENT
Start: 2025-01-11 | End: 2025-01-11

## 2025-01-11 RX ORDER — MUPIROCIN 20 MG/G
OINTMENT TOPICAL 2 TIMES DAILY
Status: DISCONTINUED | OUTPATIENT
Start: 2025-01-11 | End: 2025-01-15 | Stop reason: HOSPADM

## 2025-01-11 RX ORDER — LABETALOL HCL 20 MG/4 ML
10 SYRINGE (ML) INTRAVENOUS EVERY 6 HOURS PRN
Status: DISCONTINUED | OUTPATIENT
Start: 2025-01-11 | End: 2025-01-11

## 2025-01-11 RX ORDER — ATORVASTATIN CALCIUM 40 MG/1
40 TABLET, FILM COATED ORAL DAILY
Status: DISCONTINUED | OUTPATIENT
Start: 2025-01-11 | End: 2025-01-11

## 2025-01-11 RX ORDER — HYDROCORTISONE 25 MG/G
CREAM TOPICAL 2 TIMES DAILY PRN
Status: DISCONTINUED | OUTPATIENT
Start: 2025-01-11 | End: 2025-01-15 | Stop reason: HOSPADM

## 2025-01-11 RX ORDER — PROPOFOL 10 MG/ML
VIAL (ML) INTRAVENOUS
Status: DISCONTINUED | OUTPATIENT
Start: 2025-01-11 | End: 2025-01-11

## 2025-01-11 RX ORDER — ONDANSETRON HYDROCHLORIDE 2 MG/ML
4 INJECTION, SOLUTION INTRAVENOUS EVERY 4 HOURS PRN
Status: DISCONTINUED | OUTPATIENT
Start: 2025-01-11 | End: 2025-01-15 | Stop reason: HOSPADM

## 2025-01-11 RX ORDER — LIDOCAINE HYDROCHLORIDE 20 MG/ML
INJECTION INTRAVENOUS
Status: DISCONTINUED | OUTPATIENT
Start: 2025-01-11 | End: 2025-01-11

## 2025-01-11 RX ORDER — VASOPRESSIN 20 [USP'U]/ML
INJECTION, SOLUTION INTRAMUSCULAR; SUBCUTANEOUS
Status: DISCONTINUED | OUTPATIENT
Start: 2025-01-11 | End: 2025-01-11

## 2025-01-11 RX ORDER — NICARDIPINE HYDROCHLORIDE 0.2 MG/ML
INJECTION INTRAVENOUS
Status: COMPLETED
Start: 2025-01-11 | End: 2025-01-11

## 2025-01-11 RX ORDER — CLOPIDOGREL BISULFATE 75 MG/1
75 TABLET ORAL DAILY
Status: DISCONTINUED | OUTPATIENT
Start: 2025-01-12 | End: 2025-01-15 | Stop reason: HOSPADM

## 2025-01-11 RX ORDER — HYDRALAZINE HYDROCHLORIDE 20 MG/ML
10 INJECTION INTRAMUSCULAR; INTRAVENOUS EVERY 4 HOURS PRN
Status: DISCONTINUED | OUTPATIENT
Start: 2025-01-11 | End: 2025-01-15 | Stop reason: HOSPADM

## 2025-01-11 RX ORDER — DIPHENHYDRAMINE HYDROCHLORIDE 50 MG/ML
25 INJECTION INTRAMUSCULAR; INTRAVENOUS ONCE
Status: COMPLETED | OUTPATIENT
Start: 2025-01-11 | End: 2025-01-11

## 2025-01-11 RX ORDER — EPINEPHRINE 1 MG/ML
INJECTION, SOLUTION, CONCENTRATE INTRAVENOUS
Status: DISCONTINUED | OUTPATIENT
Start: 2025-01-11 | End: 2025-01-11

## 2025-01-11 RX ORDER — IODIXANOL 320 MG/ML
100 INJECTION, SOLUTION INTRAVASCULAR
Status: COMPLETED | OUTPATIENT
Start: 2025-01-11 | End: 2025-01-11

## 2025-01-11 RX ORDER — DIPHENHYDRAMINE HYDROCHLORIDE 50 MG/ML
25 INJECTION INTRAMUSCULAR; INTRAVENOUS ONCE AS NEEDED
Status: COMPLETED | OUTPATIENT
Start: 2025-01-11 | End: 2025-01-12

## 2025-01-11 RX ORDER — PHENYLEPHRINE HYDROCHLORIDE 10 MG/ML
INJECTION INTRAVENOUS
Status: DISCONTINUED | OUTPATIENT
Start: 2025-01-11 | End: 2025-01-11

## 2025-01-11 RX ORDER — POLYETHYLENE GLYCOL 3350 17 G/17G
17 POWDER, FOR SOLUTION ORAL DAILY
Status: DISCONTINUED | OUTPATIENT
Start: 2025-01-11 | End: 2025-01-12

## 2025-01-11 RX ORDER — BISACODYL 10 MG/1
10 SUPPOSITORY RECTAL DAILY PRN
Status: DISCONTINUED | OUTPATIENT
Start: 2025-01-11 | End: 2025-01-15 | Stop reason: HOSPADM

## 2025-01-11 RX ORDER — FENOFIBRATE 48 MG/1
48 TABLET, FILM COATED ORAL DAILY
Status: DISCONTINUED | OUTPATIENT
Start: 2025-01-12 | End: 2025-01-15 | Stop reason: HOSPADM

## 2025-01-11 RX ORDER — ROCURONIUM BROMIDE 10 MG/ML
INJECTION, SOLUTION INTRAVENOUS
Status: DISCONTINUED | OUTPATIENT
Start: 2025-01-11 | End: 2025-01-11

## 2025-01-11 RX ORDER — OXYCODONE HYDROCHLORIDE 5 MG/1
10 TABLET ORAL EVERY 6 HOURS PRN
Status: DISCONTINUED | OUTPATIENT
Start: 2025-01-12 | End: 2025-01-11

## 2025-01-11 RX ORDER — ACETAMINOPHEN 325 MG/1
650 TABLET ORAL EVERY 6 HOURS PRN
Status: DISCONTINUED | OUTPATIENT
Start: 2025-01-11 | End: 2025-01-15 | Stop reason: HOSPADM

## 2025-01-11 RX ORDER — ONDANSETRON HYDROCHLORIDE 2 MG/ML
4 INJECTION, SOLUTION INTRAVENOUS EVERY 6 HOURS PRN
Status: DISCONTINUED | OUTPATIENT
Start: 2025-01-11 | End: 2025-01-11

## 2025-01-11 RX ORDER — GLUCAGON 1 MG
1 KIT INJECTION
Status: DISCONTINUED | OUTPATIENT
Start: 2025-01-11 | End: 2025-01-11

## 2025-01-11 RX ORDER — CLOPIDOGREL BISULFATE 75 MG/1
75 TABLET ORAL DAILY
Status: DISCONTINUED | OUTPATIENT
Start: 2025-01-11 | End: 2025-01-11

## 2025-01-11 RX ORDER — INSULIN ASPART 100 [IU]/ML
0-10 INJECTION, SOLUTION INTRAVENOUS; SUBCUTANEOUS EVERY 6 HOURS PRN
Status: DISCONTINUED | OUTPATIENT
Start: 2025-01-11 | End: 2025-01-11

## 2025-01-11 RX ORDER — OXYCODONE HYDROCHLORIDE 5 MG/1
10 TABLET ORAL ONCE AS NEEDED
Status: COMPLETED | OUTPATIENT
Start: 2025-01-11 | End: 2025-01-12

## 2025-01-11 RX ORDER — SODIUM CHLORIDE 0.9 % (FLUSH) 0.9 %
10 SYRINGE (ML) INJECTION
Status: DISCONTINUED | OUTPATIENT
Start: 2025-01-11 | End: 2025-01-15 | Stop reason: HOSPADM

## 2025-01-11 RX ORDER — CARBOXYMETHYLCELLULOSE SODIUM 10 MG/ML
1 GEL OPHTHALMIC 4 TIMES DAILY PRN
Status: DISCONTINUED | OUTPATIENT
Start: 2025-01-12 | End: 2025-01-15 | Stop reason: HOSPADM

## 2025-01-11 RX ORDER — NICARDIPINE HYDROCHLORIDE 0.2 MG/ML
0-15 INJECTION INTRAVENOUS CONTINUOUS
Status: DISCONTINUED | OUTPATIENT
Start: 2025-01-11 | End: 2025-01-12

## 2025-01-11 RX ORDER — SUCCINYLCHOLINE CHLORIDE 20 MG/ML
INJECTION INTRAMUSCULAR; INTRAVENOUS
Status: DISCONTINUED | OUTPATIENT
Start: 2025-01-11 | End: 2025-01-11

## 2025-01-11 RX ORDER — HYDROXYZINE HYDROCHLORIDE 25 MG/1
25 TABLET, FILM COATED ORAL 3 TIMES DAILY PRN
Status: DISCONTINUED | OUTPATIENT
Start: 2025-01-11 | End: 2025-01-15 | Stop reason: HOSPADM

## 2025-01-11 RX ORDER — AMOXICILLIN 250 MG
1 CAPSULE ORAL 2 TIMES DAILY
Status: DISCONTINUED | OUTPATIENT
Start: 2025-01-11 | End: 2025-01-12

## 2025-01-11 RX ORDER — LABETALOL HCL 20 MG/4 ML
10 SYRINGE (ML) INTRAVENOUS EVERY 4 HOURS PRN
Status: DISCONTINUED | OUTPATIENT
Start: 2025-01-11 | End: 2025-01-15 | Stop reason: HOSPADM

## 2025-01-11 RX ORDER — CLOPIDOGREL BISULFATE 75 MG/1
75 TABLET ORAL DAILY
Status: DISCONTINUED | OUTPATIENT
Start: 2025-01-12 | End: 2025-01-11

## 2025-01-11 RX ADMIN — CLOPIDOGREL BISULFATE 75 MG: 75 TABLET ORAL at 09:01

## 2025-01-11 RX ADMIN — SUGAMMADEX 200 MG: 100 INJECTION, SOLUTION INTRAVENOUS at 02:01

## 2025-01-11 RX ADMIN — PROPOFOL 150 MG: 10 INJECTION, EMULSION INTRAVENOUS at 01:01

## 2025-01-11 RX ADMIN — AZELASTINE HYDROCHLORIDE 137 MCG: 137 SPRAY, METERED NASAL at 08:01

## 2025-01-11 RX ADMIN — ROCURONIUM BROMIDE 45 MG: 10 INJECTION INTRAVENOUS at 01:01

## 2025-01-11 RX ADMIN — PHENYLEPHRINE HYDROCHLORIDE 100 MCG: 10 INJECTION INTRAVENOUS at 02:01

## 2025-01-11 RX ADMIN — VASOPRESSIN 2 UNITS: 20 INJECTION INTRAVENOUS at 02:01

## 2025-01-11 RX ADMIN — MUPIROCIN: 20 OINTMENT TOPICAL at 08:01

## 2025-01-11 RX ADMIN — ACETAMINOPHEN 650 MG: 325 TABLET ORAL at 11:01

## 2025-01-11 RX ADMIN — PHENYLEPHRINE HYDROCHLORIDE 300 MCG: 10 INJECTION INTRAVENOUS at 02:01

## 2025-01-11 RX ADMIN — HYDROXYZINE HYDROCHLORIDE 25 MG: 25 TABLET, FILM COATED ORAL at 07:01

## 2025-01-11 RX ADMIN — APIXABAN 5 MG: 5 TABLET, FILM COATED ORAL at 08:01

## 2025-01-11 RX ADMIN — SUCCINYLCHOLINE CHLORIDE 160 MG: 20 INJECTION, SOLUTION INTRAMUSCULAR; INTRAVENOUS at 01:01

## 2025-01-11 RX ADMIN — PHENYLEPHRINE HYDROCHLORIDE 200 MCG: 10 INJECTION INTRAVENOUS at 01:01

## 2025-01-11 RX ADMIN — FENOFIBRATE 160 MG: 160 TABLET ORAL at 08:01

## 2025-01-11 RX ADMIN — IODIXANOL 35 ML: 320 INJECTION, SOLUTION INTRAVASCULAR at 02:01

## 2025-01-11 RX ADMIN — EPINEPHRINE 30 MCG: 1 INJECTION, SOLUTION, CONCENTRATE INTRAVENOUS at 02:01

## 2025-01-11 RX ADMIN — FLUTICASONE PROPIONATE 100 MCG: 50 SPRAY, METERED NASAL at 08:01

## 2025-01-11 RX ADMIN — SODIUM CHLORIDE, SODIUM GLUCONATE, SODIUM ACETATE, POTASSIUM CHLORIDE, MAGNESIUM CHLORIDE, SODIUM PHOSPHATE, DIBASIC, AND POTASSIUM PHOSPHATE: .53; .5; .37; .037; .03; .012; .00082 INJECTION, SOLUTION INTRAVENOUS at 01:01

## 2025-01-11 RX ADMIN — IOHEXOL 75 ML: 350 INJECTION, SOLUTION INTRAVENOUS at 11:01

## 2025-01-11 RX ADMIN — DIPHENHYDRAMINE HYDROCHLORIDE 25 MG: 25 CAPSULE ORAL at 09:01

## 2025-01-11 RX ADMIN — LIDOCAINE HYDROCHLORIDE 100 MG: 20 INJECTION INTRAVENOUS at 01:01

## 2025-01-11 RX ADMIN — NICARDIPINE HYDROCHLORIDE 5 MG/HR: 0.2 INJECTION INTRAVENOUS at 04:01

## 2025-01-11 RX ADMIN — PANTOPRAZOLE SODIUM 40 MG: 20 TABLET, DELAYED RELEASE ORAL at 08:01

## 2025-01-11 RX ADMIN — HYDROCODONE BITARTRATE AND ACETAMINOPHEN 1 TABLET: 10; 325 TABLET ORAL at 09:01

## 2025-01-11 RX ADMIN — ALUMINUM HYDROXIDE, MAGNESIUM HYDROXIDE, AND SIMETHICONE 30 ML: 1200; 120; 1200 SUSPENSION ORAL at 09:01

## 2025-01-11 RX ADMIN — SENNOSIDES AND DOCUSATE SODIUM 1 TABLET: 50; 8.6 TABLET ORAL at 08:01

## 2025-01-11 RX ADMIN — ROCURONIUM BROMIDE 5 MG: 10 INJECTION INTRAVENOUS at 01:01

## 2025-01-11 RX ADMIN — DIPHENHYDRAMINE HYDROCHLORIDE 25 MG: 50 INJECTION, SOLUTION INTRAMUSCULAR; INTRAVENOUS at 05:01

## 2025-01-11 RX ADMIN — EPINEPHRINE 100 MCG: 1 INJECTION, SOLUTION, CONCENTRATE INTRAVENOUS at 02:01

## 2025-01-11 RX ADMIN — EPINEPHRINE 20 MCG: 1 INJECTION, SOLUTION, CONCENTRATE INTRAVENOUS at 02:01

## 2025-01-11 RX ADMIN — ATORVASTATIN CALCIUM 80 MG: 40 TABLET, FILM COATED ORAL at 08:01

## 2025-01-11 NOTE — TELEMEDICINE CONSULT
"[facility]  Tele-Vascular Neurology   Consult Note      Consult Information  Inpatient consult to Telemedicine-General Neurology  Consult performed by: Star Viramontes MD  Consult ordered by: Biju Talavera PA-C          Consulting Provider: LUIS MIGUEL JAQUEZ   Current Providers  No providers found    Patient Location:  [facility] ICU IP Unit    Spoke hospital nurse at bedside with patient assisting consultant.  Patient information was obtained from primary team.       Vascular Neurology Documentation  Acute Stroke Times   Last Known Normal Date: 01/08/25  Last Known Normal Time: 0950  Stroke Team Arrival Date: 01/11/25  Stroke Team Arrival Time: 1013    NIH Scale:15         Modified [place]:    [place] Coma Scale:     ABCD2 Score:    [id number] Score:    HAS -BLED Score:    ICH Score:    Hunt & Zamudio Classification:      Blood pressure (!) 244/109, pulse 60, temperature 97.9 °F (36.6 °C), resp. rate 17, height 5' 7" (1.702 m), weight 79.8 kg (175 lb 14.8 oz), SpO2 98%.    VAN Stroke Assessment: Positive.  Tier 1    Medical Decision Making  HPI:  74 y.o. male with Several spinal and balance issues, Chronic pain on oxycodone, Non-Insulin-dependent DM2 with neuropathic complications, CAD s/p CABG, HTN, HLD, AFib on Eliquis, and CKD 3 who presented to [facility] ED on 01/09/2025 for eval and treatment of a fall and near syncopal event.  He underwent L4-L5 laminectomy for decompression on January 6 and his Eliquis was held. Per EMS, pt was at home using the bathroom when he felt light-headed and had a syncopal episode. He was found by a family member on the ground and has been confused since the fall.  Patient's MRI was done that showed bilateral parietal lobe acute infarctions.  Patient exam had improved completely but he again developed left hemiplegia and dysarthria with confusion at 9:57 a.m. this morning.  Tele vascular neurology was consulted further recommendations.  On urgent consult, patient has left " hemiplegia, left facial droop, dysarthria and mild right gaze preference, left ictal katiuska-neglect.  Urgent CT angio head and neck was recommended.      Images personally reviewed and interpreted:  Study: CTA Head & Neck  Study Interpretation:  CT angio head and neck demonstrates right inferior M2 occlusion that was not present on the previous CT angio    Physical exam:  Awake and follows some commands, answered 1 question, left lower facial asymmetry, LUE 0/5, LLE 2/5, RUE and RLE 4/5, dense sensory loss in the left face +arm + leg, mild-to-moderate dysarthria, mild aphasia, left tactile katiuska-neglect    NIHSS ([place] of [employer] Stroke Scale)   1a  Level of consciousness: 1=not alert but arousable by minor stimulation to obey, answer or respond   1b. LOC questions:  1 = Answers one question correctly   1c. LOC commands: 1=Answers one task correctly   2.  Best Gaze: 1=partial gaze palsy   3. Visual: 0=No visual loss   4. Facial Palsy: 2=Partial paralysis (total or near total paralysis of the lower face)   5a. Motor left arm: 4=No movement   5b.  Motor right arm: 0=No drift, limb holds 90 (or 45) degrees for full 10 seconds   6a. motor left le=Some effort against gravity, limb cannot get to or maintain (if cured) 90 (or 45) degrees, drifts down to bed, but has some effort against gravity   6b  Motor right le=No drift, limb holds 90 (or 45) degrees for full 10 seconds   7. Limb Ataxia: 0=Absent   8.  Sensory: 2=Severe to total sensory loss; patient is not aware of being touched in face, arm, leg   9. Best Language:  1=Mild to moderate aphasia; some obvious loss of fluency or facility of comprehension without significant limitation on ideas expressed or form of expression.   10. Dysarthria: 1=Mild to moderate, patient slurs at least some words and at worst, can be understood with some difficulty   11. Extinction and Inattention: 1=Visual, tactile, auditory, spatial or personal inattention or  extinction to bilateral simultaneous stimulation in one of the sensory modalities         Total:   15            Assessment and plan:  -patient is not a candidate for IV thrombolysis given recent spinal surgery  -recommend urgent transferred to [facility] [address] for mechanical thrombectomy. It was discussed in detail with primary team about off-label nature of the procedure (Given Medium vessel occlusion and acute infarcts on MRI brain) and risks of hemorrhagic conversion. Primary team had discussed risks with family. Family (Spouse and Daughter) opted for mechanical thrombectomy after clear understanding of risks    Findings were discussed with patient's provider from [facility].              Star Viramontes MD, MHA  Fellow, NeuroEndovascular Surgery, [facility]  Neurologist, [facility] [place], [place]        Additional Physical Exam, History, & ROS  ROS  Physical Exam  Past Medical History:   Diagnosis Date    Chronic heart failure with preserved ejection fraction 2/9/2023    Chronic midline low back pain without sciatica 10/2/2017    Colon polyps     Coronary artery disease involving native coronary artery of native heart 3/5/2020    Coronary artery disease involving native coronary artery of native heart with angina pectoris 12/10/2020    CVA (cerebral vascular accident) 1/11/2025    Diabetes mellitus with neurological manifestations, uncontrolled 1/24/2017    Diabetic polyneuropathy associated with type 2 diabetes mellitus 1/24/2017    Diabetic polyneuropathy associated with type 2 diabetes mellitus     Encephalopathy 1/10/2025    Essential hypertension 1/24/2017    Gastroesophageal reflux disease 1/24/2017    Hyperlipidemia 1/24/2017    Insomnia 1/24/2017    Long-term insulin use 1/24/2017    Longstanding persistent atrial fibrillation 12/30/2020    Lumbar spondylosis 11/13/2017    Nuclear sclerosis of both eyes 8/24/2017    Obesity     PAD (peripheral artery disease)  3/23/2022    Stage 3 chronic kidney disease 2/13/2019    Uncontrolled type 2 diabetes mellitus without complication, with long-term current use of insulin 1/24/2017     Past Surgical History:   Procedure Laterality Date    ARTHROSCOPIC REPAIR OF ROTATOR CUFF OF SHOULDER Right 7/5/2022    Procedure: REPAIR, ROTATOR CUFF, ARTHROSCOPIC;  Surgeon: Valerie Olivera MD;  Location: [facility] OR;  Service: Orthopedics;  Laterality: Right;  HETAL LEMUS [phone]/ TEXTED ALLAN ON 6/23/2022 @ 9:47AM. ALLAN RESPONDED ON 6/23/2022 @ 10:33AM-LO  JEAN PAUL BABIN [phone] MY BE HERE FOR CASE SPOKE TO HIM @ 9:59AM ON 7-1-2022  RN PREOP 6/28/2022    BACK SURGERY      2002    CATARACT EXTRACTION W/  INTRAOCULAR LENS IMPLANT Right 08/29/2017    Dr. Hong    CATARACT EXTRACTION W/  INTRAOCULAR LENS IMPLANT Left 02/24/2022    Dr. Hong    CAUDAL EPIDURAL STEROID INJECTION N/A 9/25/2024    Procedure: Caudal Epidural Steroid Injection;  Surgeon: Eze Byrd Jr., MD;  Location: [facility] PAIN MANAGEMENT;  Service: Pain Management;  Laterality: N/A;  @1100(prev. 715)  Eliquis last 9/21  Plavix last 9/19  Check BG  E-Consent    COLONOSCOPY N/A 2/21/2017    Procedure: COLONOSCOPY;  Surgeon: Robb Rosado MD;  Location: [facility] ENDO;  Service: Endoscopy;  Laterality: N/A;    COLONOSCOPY N/A 7/5/2023    Procedure: COLONOSCOPY;  Surgeon: Aston Varela MD;  Location: [facility] ENDO;  Service: Endoscopy;  Laterality: N/A;  Referral: JOVANNI SCANLON to hold Plavix 5 days and Eliquis 2 days per Dr Purdy-OFELIA   Meds  Suprep   Inst portal   LW    CORONARY ANGIOGRAPHY INCLUDING BYPASS GRAFTS WITH CATHETERIZATION OF LEFT HEART N/A 11/11/2020    Procedure: ANGIOGRAM, CORONARY, INCLUDING BYPASS GRAFT, WITH LEFT HEART CATHETERIZATION;  Surgeon: Lane Cuellar MD;  Location: [facility] CATH LAB;  Service: Cardiology;  Laterality: N/A;  RN PRE OP 11-5-2020. --COVID NEGATIVE ON  11-. C A    CORONARY ARTERY BYPASS GRAFT       2016    EPIDURAL STEROID INJECTION Bilateral 2018    Procedure: Lumbar Medial Branch Blocks;  Surgeon: Eze Byrd Jr., MD;  Location: [facility] ENDO;  Service: Pain Management;  Laterality: Bilateral;  Bilateral Lumbar Medial Branch Blocks L2, L3, L4, L5    81968  19693    Arrive @ 1150; NO Sedation    EPIDURAL STEROID INJECTION Bilateral 2018    Procedure: Injection, Steroid, Epidural;  Surgeon: Eze Byrd Jr., MD;  Location: [facility] ENDO;  Service: Pain Management;  Laterality: Bilateral;  Bilateral Sacroiliac Joint Steroid Injections    [id number]    Arrive @ 0910    EPIDURAL STEROID INJECTION Bilateral 3/20/2019    Procedure: Injection, Steroid, Sacroiliiac Joint;  Surgeon: Eze Byrd Jr., MD;  Location: [facility] ENDO;  Service: Pain Management;  Laterality: Bilateral;  Bilateral Sacroiliac Joint Steroid Injections    [id number]    Arrive @ 1145; Trigger point injections also?    EPIDURAL STEROID INJECTION Right 2023    Procedure: Right L5 Transforaminal Epidural Steroid Injection;  Surgeon: Eze Byrd Jr., MD;  Location: [facility] ENDO;  Service: Pain Management;  Laterality: Right;  @0830 (given)  Eliquis last   Plavix last   Check BG    EPIDURAL STEROID INJECTION Right 10/11/2023    Procedure: Right L3 (infraneural) + S1 Transforaminal Epidural Steroid Injections;  Surgeon: Eze Byrd Jr., MD;  Location: [facility];  Service: Pain Management;  Laterality: Right;  @0745 (requests morning)  Eliquis 10/7 & Plavix 10/5  Check BG    ESOPHAGOGASTRODUODENOSCOPY N/A 2023    Procedure: EGD (ESOPHAGOGASTRODUODENOSCOPY);  Surgeon: Anita Oswald MD;  Location: [facility];  Service: Endoscopy;  Laterality: N/A;  Procedure Timin-4 weeks  Provider: Any GI provider  Location: No Preference  Additional Scheduling Information: Blood thinners  Prep Specifications:N/A   ref. by Dr. Light, instr. to portal, , [facility] meds-st  ok to hold Plavix 5 days and  Eliquis 2 day    INTRAOCULAR PROSTHESES INSERTION Left 2/24/2022    Procedure: INSERTION, IOL PROSTHESIS;  Surgeon: Nickolas Hong MD;  Location: [facility] OR;  Service: Ophthalmology;  Laterality: Left;    LAMINOFORAMINOTOMY OF SPINE USING MINIMALLY INVASIVE TECHNIQUE Right 1/6/2025    Procedure: LAMINOFORAMINOTOMY, SPINE, MINIMALLY INVASIVE;  Surgeon: Luis M Ellis MD;  Location: [facility] OR;  Service: Neurosurgery;  Laterality: Right;  Right L4/5 minimally invasive laminotomy/microdiscectomy. silvana table, lucio frame, fluoro, microscope, no vendor, no monitoring  CLEARED BY CARDS AND PCP   RN PREOP 12/30/24---T/S--done 12/30-----NEED ORDERS    PHACOEMULSIFICATION OF CATARACT Left 2/24/2022    Procedure: PHACOEMULSIFICATION, CATARACT;  Surgeon: Nickolas Hong MD;  Location: [facility] OR;  Service: Ophthalmology;  Laterality: Left;  RN Phone Pre Op 2-16-22.  Covid NEGATIVE  2-23-22.  Arrival 08:00 am.    TONSILLECTOMY       Family History   Problem Relation Name Age of Onset    Depression Mother      Heart disease Mother      Stroke Father      No Known Problems Sister Elaine     Diabetes Sister Dilcia     No Known Problems Sister Idalia     Blindness Brother Burt     No Known Problems Maternal Aunt      No Known Problems Maternal Uncle      No Known Problems Paternal Aunt      No Known Problems Paternal Uncle      No Known Problems Maternal Grandmother      No Known Problems Maternal Grandfather      No Known Problems Paternal Grandmother      No Known Problems Paternal Grandfather      Amblyopia Neg Hx      Cancer Neg Hx      Cataracts Neg Hx      Glaucoma Neg Hx      Hypertension Neg Hx      Macular degeneration Neg Hx      Retinal detachment Neg Hx      Strabismus Neg Hx      Thyroid disease Neg Hx         Diagnoses  No problems updated.    Star Viramontes MD    Neurology consultation requested by spoke provider. Audiovisual encounter with the patient performed using a secure connection.  Results  and impressions from the visit are documented on this note and were communicated to the consulting provider/team via direct communication. The note has been shared for addition to the patients electronic medical record.

## 2025-01-11 NOTE — CONSULTS
[facility] - Telemetry  Neurosurgery  Consult Note    Consults  Subjective:     Chief Complaint/Reason for Admission: Stroke    History of Present Illness: This is a 73yo male with a history of CAD s/p CABG, on eliquis and plavix, who underwent MIS R L4/5 decompression with me on 1/6/25. He was doing very well until a fall on 1/8/25, when unfortunately he was found down in his home and was exhibiting language difficulties. He was brought to the ED. CT L spine was unremarkable other than post op changes of laminotomy at L4/5. CT Head was unremarkable. He remained confused in the ED but did not exhibit overt aphasia.    Neurology was consulted for encephalopathy and seizure/stroke workup was obtained. EEG was negative.  MRI shows bilateral parietal strokes which are patchy in distribution. CTA was negative for any large vessel occlusion.    He has been started back on his eliquis and is re-starting plavix today.    For me, patient does not currently endorse back pain (but he is currently in bed). Denies leg pain.    PTA Medications   Medication Sig    HYDROcodone-acetaminophen (NORCO)  mg per tablet Take 1 tablet by mouth every 4 (four) hours as needed for Pain (7-10/10 pain).    acetaminophen (TYLENOL) 650 MG TbSR Take 650 mg by mouth every 8 (eight) hours.    albuterol (PROVENTIL/VENTOLIN HFA) 90 mcg/actuation inhaler Inhale 2 puffs into the lungs every 4 (four) hours as needed for Shortness of Breath. Rescue    apixaban (ELIQUIS) 5 mg Tab Take 1 tablet (5 mg total) by mouth 2 (two) times daily.    ascorbic acid, vitamin C, (VITAMIN C) 100 MG tablet Take 100 mg by mouth daily as needed.    atorvastatin (LIPITOR) 80 MG tablet TAKE 1 TABLET EVERY EVENING    b complex vitamins tablet Take 1 tablet by mouth once daily.    blood-glucose meter kit Test glucose 2-3x/day. True metrix    clopidogreL (PLAVIX) 75 mg tablet Take 75 mg by mouth once daily.    coenzyme Q10 100 mg capsule Take 1 capsule (100 mg total) by  mouth once daily.    dapagliflozin propanediol (FARXIGA) 5 mg Tab tablet Take 1 tablet (5 mg total) by mouth once daily.    ergocalciferol (ERGOCALCIFEROL) 50,000 unit Cap TAKE 1 CAPSULE BY MOUTH WEEKLY    fenofibrate 160 MG Tab TAKE 1 TABLET EVERY MORNING    fluticasone propionate (FLONASE) 50 mcg/actuation nasal spray 2 sprays (100 mcg total) by Each Nostril route 2 (two) times daily.    furosemide (LASIX) 20 MG tablet TAKE 1 TABLET TWICE DAILY    gabapentin (NEURONTIN) 300 MG capsule Take 2 capsules (600 mg total) by mouth 2 (two) times daily.    glimepiride (AMARYL) 1 MG tablet TAKE 1 TABLET BEFORE BREAKFAST    hydrALAZINE (APRESOLINE) 50 MG tablet TAKE 1 TABLET TWICE DAILY    insulin degludec (TRESIBA FLEXTOUCH U-100) 100 unit/mL (3 mL) insulin pen Inject 20 Units into the skin once daily.    isosorbide mononitrate (IMDUR) 120 MG 24 hr tablet Take 1 tablet (120 mg total) by mouth once daily.    lancets Misc Check blood glucose 2x/day. True metrix. E11.65    linaCLOtide (LINZESS) 145 mcg Cap capsule Take 1 capsule (145 mcg total) by mouth before breakfast.    meclizine (ANTIVERT) 25 mg tablet Take 1 tablet (25 mg total) by mouth 3 (three) times daily as needed for Dizziness (or at least one HS for 1 week when vertigo active).    metFORMIN (GLUCOPHAGE-XR) 500 MG ER 24hr tablet Take 1 tablet with breakfast and 2 tablets with supper.    metFORMIN (GLUCOPHAGE-XR) 500 MG ER 24hr tablet Take 1 tablet with breakfast and 2 tablets with supper.    methocarbamoL (ROBAXIN) 750 MG Tab Take 1 tablet (750 mg total) by mouth 3 (three) times daily as needed (muscle spasms).    nitroGLYCERIN (NITROSTAT) 0.4 MG SL tablet Place 1 tablet (0.4 mg total) under the tongue every 5 (five) minutes as needed for Chest pain.    omega-3 acid ethyl esters (LOVAZA) 1 gram capsule Take 2 capsules (2 g total) by mouth 2 (two) times daily.    ondansetron (ZOFRAN-ODT) 4 MG TbDL Dissolvxe 1 tablet (4 mg total) by mouth every 6 (six) hours as  "needed (nausea).    pantoprazole (PROTONIX) 40 MG tablet TAKE 1 TABLET EVERY DAY    pen needle, diabetic 32 gauge x 1/4" Ndle Use daily    semaglutide (OZEMPIC) 2 mg/dose (8 mg/3 mL) PnIj Inject 2 mg into the skin every 7 days.    triazolam (HALCION) 0.25 MG Tab TAKE 1 TABLET BY MOUTH EVERY NIGHT AT BEDTIME AS NEEDED       Review of patient's allergies indicates:   Allergen Reactions    Penicillins Other (See Comments)     Unknown reaction, Had a reaction as a child    Shrimp Itching     Hand Itching       Past Medical History:   Diagnosis Date    Chronic heart failure with preserved ejection fraction 2/9/2023    Chronic midline low back pain without sciatica 10/2/2017    Colon polyps     Coronary artery disease involving native coronary artery of native heart 3/5/2020    Coronary artery disease involving native coronary artery of native heart with angina pectoris 12/10/2020    Diabetes mellitus with neurological manifestations, uncontrolled 1/24/2017    Diabetic polyneuropathy associated with type 2 diabetes mellitus 1/24/2017    Diabetic polyneuropathy associated with type 2 diabetes mellitus     Encephalopathy 1/10/2025    Essential hypertension 1/24/2017    Gastroesophageal reflux disease 1/24/2017    Hyperlipidemia 1/24/2017    Insomnia 1/24/2017    Long-term insulin use 1/24/2017    Longstanding persistent atrial fibrillation 12/30/2020    Lumbar spondylosis 11/13/2017    Nuclear sclerosis of both eyes 8/24/2017    Obesity     PAD (peripheral artery disease) 3/23/2022    Stage 3 chronic kidney disease 2/13/2019    Uncontrolled type 2 diabetes mellitus without complication, with long-term current use of insulin 1/24/2017     Past Surgical History:   Procedure Laterality Date    ARTHROSCOPIC REPAIR OF ROTATOR CUFF OF SHOULDER Right 7/5/2022    Procedure: REPAIR, ROTATOR CUFF, ARTHROSCOPIC;  Surgeon: Valerie Olivera MD;  Location: [facility];  Service: Orthopedics;  Laterality: Right;  ANAYELI AND NEPHJENNIFER LEMUS " [phone]/ TEXTED ALLAN ON 6/23/2022 @ 9:47AM. ALLAN RESPONDED ON 6/23/2022 @ 10:33AM-BREANNA BABIN [phone] MY BE HERE FOR CASE SPOKE TO HIM @ 9:59AM ON 7-1-2022  RN PREOP 6/28/2022    BACK SURGERY      2002    CATARACT EXTRACTION W/  INTRAOCULAR LENS IMPLANT Right 08/29/2017    Dr. Hong    CATARACT EXTRACTION W/  INTRAOCULAR LENS IMPLANT Left 02/24/2022    Dr. Hong    CAUDAL EPIDURAL STEROID INJECTION N/A 9/25/2024    Procedure: Caudal Epidural Steroid Injection;  Surgeon: Eze Byrd Jr., MD;  Location: [facility] PAIN MANAGEMENT;  Service: Pain Management;  Laterality: N/A;  @1100(prev. 715)  Eliquis last 9/21  Plavix last 9/19  Check BG  E-Consent    COLONOSCOPY N/A 2/21/2017    Procedure: COLONOSCOPY;  Surgeon: Robb Rosado MD;  Location: [facility] ENDO;  Service: Endoscopy;  Laterality: N/A;    COLONOSCOPY N/A 7/5/2023    Procedure: COLONOSCOPY;  Surgeon: Aston Varela MD;  Location: [facility] ENDO;  Service: Endoscopy;  Laterality: N/A;  Referral: JOVANNI SCANLON  ok to hold Plavix 5 days and Eliquis 2 days per Dr Purdy-GT   Meds  Suprep   Inst portal   LW    CORONARY ANGIOGRAPHY INCLUDING BYPASS GRAFTS WITH CATHETERIZATION OF LEFT HEART N/A 11/11/2020    Procedure: ANGIOGRAM, CORONARY, INCLUDING BYPASS GRAFT, WITH LEFT HEART CATHETERIZATION;  Surgeon: Lane Cuellar MD;  Location: [facility] CATH LAB;  Service: Cardiology;  Laterality: N/A;  RN PRE OP 11-5-2020. --COVID NEGATIVE ON  11-. C A    CORONARY ARTERY BYPASS GRAFT      March 2016    EPIDURAL STEROID INJECTION Bilateral 11/14/2018    Procedure: Lumbar Medial Branch Blocks;  Surgeon: Eze Byrd Jr., MD;  Location: [facility] ENDO;  Service: Pain Management;  Laterality: Bilateral;  Bilateral Lumbar Medial Branch Blocks L2, L3, L4, L5    [id number]  [id number]    Arrive @ 1150; NO Sedation    EPIDURAL STEROID INJECTION Bilateral 11/28/2018    Procedure: Injection, Steroid, Epidural;  Surgeon: Eze Byrd Jr., MD;   Location: [facility] ENDO;  Service: Pain Management;  Laterality: Bilateral;  Bilateral Sacroiliac Joint Steroid Injections    44140    Arrive @ 0910    EPIDURAL STEROID INJECTION Bilateral 3/20/2019    Procedure: Injection, Steroid, Sacroiliiac Joint;  Surgeon: Eze Byrd Jr., MD;  Location: [facility] ENDO;  Service: Pain Management;  Laterality: Bilateral;  Bilateral Sacroiliac Joint Steroid Injections    52872    Arrive @ 1145; Trigger point injections also?    EPIDURAL STEROID INJECTION Right 2023    Procedure: Right L5 Transforaminal Epidural Steroid Injection;  Surgeon: Eze Byrd Jr., MD;  Location: [facility] ENDO;  Service: Pain Management;  Laterality: Right;  @0830 (given)  Eliquis last   Plavix last   Check BG    EPIDURAL STEROID INJECTION Right 10/11/2023    Procedure: Right L3 (infraneural) + S1 Transforaminal Epidural Steroid Injections;  Surgeon: Eze Byrd Jr., MD;  Location: [facility] ENDO;  Service: Pain Management;  Laterality: Right;  @0745 (requests morning)  Eliquis 10/7 & Plavix 10/5  Check BG    ESOPHAGOGASTRODUODENOSCOPY N/A 2023    Procedure: EGD (ESOPHAGOGASTRODUODENOSCOPY);  Surgeon: Anita Oswald MD;  Location: [facility] ENDO;  Service: Endoscopy;  Laterality: N/A;  Procedure Timin-4 weeks  Provider: Any GI provider  Location: No Preference  Additional Scheduling Information: Blood thinners  Prep Specifications:N/A   ref. by Dr. Light, instr. to portal, , [facility] med[facility]-  ok to hold Plavix 5 days and Eliquis 2 day    INTRAOCULAR PROSTHESES INSERTION Left 2022    Procedure: INSERTION, IOL PROSTHESIS;  Surgeon: Nickolas Hong MD;  Location: [facility] OR;  Service: Ophthalmology;  Laterality: Left;    LAMINOFORAMINOTOMY OF SPINE USING MINIMALLY INVASIVE TECHNIQUE Right 2025    Procedure: LAMINOFORAMINOTOMY, SPINE, MINIMALLY INVASIVE;  Surgeon: Luis M Ellis MD;  Location: [facility] OR;  Service: Neurosurgery;  Laterality: Right;  Right L4/5  minimally invasive laminotomy/microdiscectomy. silvana table, lucio frame, fluoro, microscope, no vendor, no monitoring  CLEARED BY CARDS AND PCP   RN PREOP 12/30/24---T/S--done 12/30-----NEED ORDERS    PHACOEMULSIFICATION OF CATARACT Left 2/24/2022    Procedure: PHACOEMULSIFICATION, CATARACT;  Surgeon: Nickolas Hong MD;  Location: [facility] OR;  Service: Ophthalmology;  Laterality: Left;  RN Phone Pre Op 2-16-22.  Covid NEGATIVE  2-23-22.  Arrival 08:00 am.    TONSILLECTOMY       Family History       Problem Relation (Age of Onset)    Blindness Brother    Depression Mother    Diabetes Sister    Heart disease Mother    No Known Problems Sister, Sister, Maternal Aunt, Maternal Uncle, Paternal Aunt, Paternal Uncle, Maternal Grandmother, Maternal Grandfather, Paternal Grandmother, Paternal Grandfather    Stroke Father          Tobacco Use    Smoking status: Never     Passive exposure: Never    Smokeless tobacco: Never   Substance and Sexual Activity    Alcohol use: Not Currently    Drug use: No    Sexual activity: Yes     Partners: Female     Review of Systems  Objective:     Weight: 79.8 kg (175 lb 14.8 oz)  Body mass index is 27.55 kg/m².  Vital Signs (Most Recent):  Temp: 97.9 °F (36.6 °C) (01/11/25 0727)  Pulse: 60 (01/11/25 0727)  Resp: 19 (01/11/25 0727)  BP: (!) 189/85 (01/11/25 0727)  SpO2: 98 % (01/11/25 0727) Vital Signs (24h Range):  Temp:  [97.9 °F (36.6 °C)-98.4 °F (36.9 °C)] 97.9 °F (36.6 °C)  Pulse:  [60-79] 60  Resp:  [18-26] 19  SpO2:  [97 %-100 %] 98 %  BP: (159-221)/(72-98) 189/85         Neurosurgery Physical Exam  Patient is awake and alert  Conversant but confused  Fcx4  GCS 14  MAEW, symmetric  4/5 motor strength in b/l EHL/DF, stable to pre op  MIS incision is dressed, appears cdi    Significant Labs:  Recent Labs   Lab 01/09/25  1113 01/10/25  0328 01/11/25  0431   GLU 76 76 72    140 137   K 4.6 4.5 4.5    111* 107   CO2 25 20* 21*   BUN 28* 24* 21   CREATININE 1.5* 1.1 1.1    CALCIUM 9.0 9.4 9.4   MG 1.8 1.7 1.6     Recent Labs   Lab 01/09/25  1113 01/10/25  0328 01/11/25  0431   WBC 8.80 7.76 6.38   HGB 12.7* 13.4* 12.7*   HCT 41.3 42.8 40.9    210 215     Recent Labs   Lab 01/09/25  1113   INR 1.1     Microbiology Results (last 7 days)       ** No results found for the last 168 hours. **            Significant Diagnostics:  CT L spine 1/9/25 shows expected post op changes    CTH 1/9 unremarkable    MRI B 1/10 shows patchy bilateral parietal CVAs    MRA unremarkable    CTA unremarkable other than R ICA stenosis 50%, no large vessel occlusion    Assessment/Plan:   This is a 73yo gentleman POD#5 s/p R L4/5 MIS decompression who presents with bilateral strokes  -no surgical intervention is indicated  -defer to neurology for stroke workup and management  -ok to continue patient on his eliquis and plavix; this is likely the optimal treatment for his strokes but defer to neurology. He is likely far enough out from surgery that this is safe, but be on the lookout for signs of surgical site hematoma (particularly any worsening of right foot function; he has baseline weakness in dorsiflexion)  -recommend PT/OT. May need inpatient rehab  -once outpatient will need imaging of parotid lesions  -will continue to follow    Luis M Ellis  Neurosurgery      Active Diagnoses:    Diagnosis Date Noted POA    PRINCIPAL PROBLEM:  Encephalopathy [G93.40] 01/10/2025 Unknown    Fall at home [W19.XXXA, Y92.009] 01/10/2025 Not Applicable    Bradycardia [R00.1] 01/10/2025 Yes    Syncope and collapse [R55] 01/10/2025 Unknown    Impaired mobility and ADLs [Z74.09, Z78.9] 11/15/2021 Yes    Longstanding persistent atrial fibrillation [I48.11] 12/30/2020 Yes    Hx of CABG [Z95.1] 03/05/2020 Not Applicable    Posture imbalance [R29.3] 08/22/2017 Yes    Type 2 diabetes mellitus with peripheral neuropathy [E11.42] 01/24/2017 Yes    Insomnia [G47.00] 01/24/2017 Yes    Gastroesophageal reflux disease [K21.9]  01/24/2017 Yes    Mixed hyperlipidemia [E78.2] 01/24/2017 Yes    Essential hypertension [I10] 01/24/2017 Yes      Problems Resolved During this Admission:       Thank you for your consult.     Luis M Ellis MD  Neurosurgery  [facility]

## 2025-01-11 NOTE — SUBJECTIVE & OBJECTIVE
Past Medical History:   Diagnosis Date    Chronic heart failure with preserved ejection fraction 2/9/2023    Chronic midline low back pain without sciatica 10/2/2017    Colon polyps     Coronary artery disease involving native coronary artery of native heart 3/5/2020    Coronary artery disease involving native coronary artery of native heart with angina pectoris 12/10/2020    CVA (cerebral vascular accident) 1/11/2025    Diabetes mellitus with neurological manifestations, uncontrolled 1/24/2017    Diabetic polyneuropathy associated with type 2 diabetes mellitus 1/24/2017    Diabetic polyneuropathy associated with type 2 diabetes mellitus     Encephalopathy 1/10/2025    Essential hypertension 1/24/2017    Gastroesophageal reflux disease 1/24/2017    Hyperlipidemia 1/24/2017    Insomnia 1/24/2017    Long-term insulin use 1/24/2017    Longstanding persistent atrial fibrillation 12/30/2020    Lumbar spondylosis 11/13/2017    Nuclear sclerosis of both eyes 8/24/2017    Obesity     PAD (peripheral artery disease) 3/23/2022    Stage 3 chronic kidney disease 2/13/2019    Uncontrolled type 2 diabetes mellitus without complication, with long-term current use of insulin 1/24/2017     Past Surgical History:   Procedure Laterality Date    ARTHROSCOPIC REPAIR OF ROTATOR CUFF OF SHOULDER Right 7/5/2022    Procedure: REPAIR, ROTATOR CUFF, ARTHROSCOPIC;  Surgeon: Valerie Olivera MD;  Location: [facility];  Service: Orthopedics;  Laterality: Right;  GUADALUPE AND NEPHJENNIFER LEMUS [phone]/ IAR LEMUS ON 6/23/2022 @ 9:47AM. ALLAN RESPONDED ON 6/23/2022 @ 10:33AM-  JEAN PAUL BABIN [phone] MY BE HERE FOR CASE SPOKE TO HIM @ 9:59AM ON 7-1-2022  RN PREOP 6/28/2022    BACK SURGERY      2002    CATARACT EXTRACTION W/  INTRAOCULAR LENS IMPLANT Right 08/29/2017    Dr. Hong    CATARACT EXTRACTION W/  INTRAOCULAR LENS IMPLANT Left 02/24/2022    Dr. Hong    CAUDAL EPIDURAL STEROID INJECTION N/A 9/25/2024    Procedure: Caudal  Epidural Steroid Injection;  Surgeon: Eze Byrd Jr., MD;  Location: [facility];  Service: Pain Management;  Laterality: N/A;  @1100(prev. 715)  Eliquis last 9/21  Plavix last 9/19  Check BG  E-Consent    COLONOSCOPY N/A 2/21/2017    Procedure: COLONOSCOPY;  Surgeon: Robb Rosado MD;  Location: [facility] ENDO;  Service: Endoscopy;  Laterality: N/A;    COLONOSCOPY N/A 7/5/2023    Procedure: COLONOSCOPY;  Surgeon: Aston Varela MD;  Location: [facility] ENDO;  Service: Endoscopy;  Laterality: N/A;  Referral: JOVANNI SCANLON to hold Plavix 5 days and Eliquis 2 days per Dr Ford  [facility], [facility]  Suprep   Inst portal   LW    CORONARY ANGIOGRAPHY INCLUDING BYPASS GRAFTS WITH CATHETERIZATION OF LEFT HEART N/A 11/11/2020    Procedure: ANGIOGRAM, CORONARY, INCLUDING BYPASS GRAFT, WITH LEFT HEART CATHETERIZATION;  Surgeon: Lane Cuellar MD;  Location: [facility] CATH LAB;  Service: Cardiology;  Laterality: N/A;  RN PRE OP 11-5-2020. --COVID NEGATIVE ON  11-. C A    CORONARY ARTERY BYPASS GRAFT      March 2016    EPIDURAL STEROID INJECTION Bilateral 11/14/2018    Procedure: Lumbar Medial Branch Blocks;  Surgeon: Eze Byrd Jr., MD;  Location: [facility];  Service: Pain Management;  Laterality: Bilateral;  Bilateral Lumbar Medial Branch Blocks L2, L3, L4, L5    24482  11574    Arrive @ 1150; NO Sedation    EPIDURAL STEROID INJECTION Bilateral 11/28/2018    Procedure: Injection, Steroid, Epidural;  Surgeon: Eze Byrd Jr., MD;  Location: [facility];  Service: Pain Management;  Laterality: Bilateral;  Bilateral Sacroiliac Joint Steroid Injections    [id number]    Arrive @ 0910    EPIDURAL STEROID INJECTION Bilateral 3/20/2019    Procedure: Injection, Steroid, Sacroiliiac Joint;  Surgeon: Eze Byrd Jr., MD;  Location: [facility];  Service: Pain Management;  Laterality: Bilateral;  Bilateral Sacroiliac Joint Steroid Injections    [id number]    Arrive @ 1145; Trigger point injections also?     EPIDURAL STEROID INJECTION Right 2023    Procedure: Right L5 Transforaminal Epidural Steroid Injection;  Surgeon: Eze Byrd Jr., MD;  Location: [facility] ENDO;  Service: Pain Management;  Laterality: Right;  @0830 (given)  Eliquis last   Plavix last   Check BG    EPIDURAL STEROID INJECTION Right 10/11/2023    Procedure: Right L3 (infraneural) + S1 Transforaminal Epidural Steroid Injections;  Surgeon: Eze Byrd Jr., MD;  Location: [facility] ENDO;  Service: Pain Management;  Laterality: Right;  @0745 (requests morning)  Eliquis 10/7 & Plavix 10/5  Check BG    ESOPHAGOGASTRODUODENOSCOPY N/A 2023    Procedure: EGD (ESOPHAGOGASTRODUODENOSCOPY);  Surgeon: Anita Oswald MD;  Location: [facility] ENDO;  Service: Endoscopy;  Laterality: N/A;  Procedure Timin-4 weeks  Provider: Any GI provider  Location: No Preference  Additional Scheduling Information: Blood thinners  Prep Specifications:N/A   ref. by Dr. Light, instr. to portal, , [facility] meds-st  ok to hold Plavix 5 days and Eliquis 2 day    INTRAOCULAR PROSTHESES INSERTION Left 2022    Procedure: INSERTION, IOL PROSTHESIS;  Surgeon: Nickolas Hong MD;  Location: [facility] OR;  Service: Ophthalmology;  Laterality: Left;    LAMINOFORAMINOTOMY OF SPINE USING MINIMALLY INVASIVE TECHNIQUE Right 2025    Procedure: LAMINOFORAMINOTOMY, SPINE, MINIMALLY INVASIVE;  Surgeon: Luis M Ellis MD;  Location: [facility] OR;  Service: Neurosurgery;  Laterality: Right;  Right L4/5 minimally invasive laminotomy/microdiscectomy. silvana table, lucio frame, fluoro, microscope, no vendor, no monitoring  CLEARED BY CARDS AND PCP   RN PREOP 24---T/S--done -----NEED ORDERS    PHACOEMULSIFICATION OF CATARACT Left 2022    Procedure: PHACOEMULSIFICATION, CATARACT;  Surgeon: Nickolas Hong MD;  Location: [facility] OR;  Service: Ophthalmology;  Laterality: Left;  RN Phone Pre Op 22.  Covid NEGATIVE  22.  Arrival 08:00 am.     TONSILLECTOMY        Current Facility-Administered Medications on File Prior to Encounter   Medication Dose Route Frequency Provider Last Rate Last Admin    cyclopentolate 1% ophthalmic solution 1 drop  1 drop Left Eye On Call Procedure Nickolas Hong MD   1 drop at 02/24/22 0901    ofloxacin 0.3 % ophthalmic solution 1 drop  1 drop Left Eye On Call Procedure Nickolas Hong MD   2 drop at 02/24/22 1017    sodium chloride 0.9% flush 10 mL  10 mL Intravenous PRN Nickolas Hong MD        triamcinolone acetonide injection 40 mg  40 mg Intra-articular  Valerie Olivera MD   40 mg at 02/09/23 1030     Current Outpatient Medications on File Prior to Encounter   Medication Sig Dispense Refill    HYDROcodone-acetaminophen (NORCO)  mg per tablet Take 1 tablet by mouth every 4 (four) hours as needed for Pain (7-10/10 pain). 42 tablet 0    acetaminophen (TYLENOL) 650 MG TbSR Take 650 mg by mouth every 8 (eight) hours.      albuterol (PROVENTIL/VENTOLIN HFA) 90 mcg/actuation inhaler Inhale 2 puffs into the lungs every 4 (four) hours as needed for Shortness of Breath. Rescue 17 g 3    apixaban (ELIQUIS) 5 mg Tab Take 1 tablet (5 mg total) by mouth 2 (two) times daily.      ascorbic acid, vitamin C, (VITAMIN C) 100 MG tablet Take 100 mg by mouth daily as needed.      atorvastatin (LIPITOR) 80 MG tablet TAKE 1 TABLET EVERY EVENING 90 tablet 3    b complex vitamins tablet Take 1 tablet by mouth once daily.      blood-glucose meter kit Test glucose 2-3x/day. True metrix 1 each 0    clopidogreL (PLAVIX) 75 mg tablet Take 75 mg by mouth once daily.      coenzyme Q10 100 mg capsule Take 1 capsule (100 mg total) by mouth once daily.      dapagliflozin propanediol (FARXIGA) 5 mg Tab tablet Take 1 tablet (5 mg total) by mouth once daily. 90 tablet 3    ergocalciferol (ERGOCALCIFEROL) 50,000 unit Cap TAKE 1 CAPSULE BY MOUTH WEEKLY 12 capsule 0    fenofibrate 160 MG Tab TAKE 1 TABLET EVERY MORNING 90  tablet 1    fluticasone propionate (FLONASE) 50 mcg/actuation nasal spray 2 sprays (100 mcg total) by Each Nostril route 2 (two) times daily. 18.2 mL 3    furosemide (LASIX) 20 MG tablet TAKE 1 TABLET TWICE DAILY 180 tablet 3    gabapentin (NEURONTIN) 300 MG capsule Take 2 capsules (600 mg total) by mouth 2 (two) times daily.      glimepiride (AMARYL) 1 MG tablet TAKE 1 TABLET BEFORE BREAKFAST 90 tablet 3    hydrALAZINE (APRESOLINE) 50 MG tablet TAKE 1 TABLET TWICE DAILY 180 tablet 3    insulin degludec (TRESIBA FLEXTOUCH U-100) 100 unit/mL (3 mL) insulin pen Inject 20 Units into the skin once daily. 30 mL 4    isosorbide mononitrate (IMDUR) 120 MG 24 hr tablet Take 1 tablet (120 mg total) by mouth once daily. 90 tablet 3    lancets Misc Check blood glucose 2x/day. True metrix. E11.65 200 each 3    linaCLOtide (LINZESS) 145 mcg Cap capsule Take 1 capsule (145 mcg total) by mouth before breakfast. 30 capsule 0    meclizine (ANTIVERT) 25 mg tablet Take 1 tablet (25 mg total) by mouth 3 (three) times daily as needed for Dizziness (or at least one HS for 1 week when vertigo active). 60 tablet 12    metFORMIN (GLUCOPHAGE-XR) 500 MG ER 24hr tablet Take 1 tablet with breakfast and 2 tablets with supper. 270 tablet 12    metFORMIN (GLUCOPHAGE-XR) 500 MG ER 24hr tablet Take 1 tablet with breakfast and 2 tablets with supper. 270 tablet 2    methocarbamoL (ROBAXIN) 750 MG Tab Take 1 tablet (750 mg total) by mouth 3 (three) times daily as needed (muscle spasms). 60 tablet 0    nitroGLYCERIN (NITROSTAT) 0.4 MG SL tablet Place 1 tablet (0.4 mg total) under the tongue every 5 (five) minutes as needed for Chest pain. 25 tablet 11    omega-3 acid ethyl esters (LOVAZA) 1 gram capsule Take 2 capsules (2 g total) by mouth 2 (two) times daily.      ondansetron (ZOFRAN-ODT) 4 MG TbDL Dissolvxe 1 tablet (4 mg total) by mouth every 6 (six) hours as needed (nausea). 15 tablet 0    pantoprazole (PROTONIX) 40 MG tablet TAKE 1 TABLET EVERY DAY  "90 tablet 3    pen needle, diabetic 32 gauge x 1/4" Ndle Use daily 200 each 3    semaglutide (OZEMPIC) 2 mg/dose (8 mg/3 mL) PnIj Inject 2 mg into the skin every 7 days. 4 each 3    triazolam (HALCION) 0.25 MG Tab TAKE 1 TABLET BY MOUTH EVERY NIGHT AT BEDTIME AS NEEDED 90 tablet 5      Allergies: Penicillins and Shrimp  Family History   Problem Relation Name Age of Onset    Depression Mother      Heart disease Mother      Stroke Father      No Known Problems Sister Elaine     Diabetes Sister Dilcia     No Known Problems Sister Idalia     Blindness Brother Burt     No Known Problems Maternal Aunt      No Known Problems Maternal Uncle      No Known Problems Paternal Aunt      No Known Problems Paternal Uncle      No Known Problems Maternal Grandmother      No Known Problems Maternal Grandfather      No Known Problems Paternal Grandmother      No Known Problems Paternal Grandfather      Amblyopia Neg Hx      Cancer Neg Hx      Cataracts Neg Hx      Glaucoma Neg Hx      Hypertension Neg Hx      Macular degeneration Neg Hx      Retinal detachment Neg Hx      Strabismus Neg Hx      Thyroid disease Neg Hx       Social History     Tobacco Use    Smoking status: Never     Passive exposure: Never    Smokeless tobacco: Never   Substance Use Topics    Alcohol use: Not Currently    Drug use: No     Review of Systems   Constitutional:  Positive for fatigue.   HENT: Negative.     Eyes: Negative.    Respiratory: Negative.     Cardiovascular: Negative.    Gastrointestinal: Negative.    Endocrine: Negative.    Genitourinary: Negative.    Musculoskeletal: Negative.    Skin: Negative.    Allergic/Immunologic: Negative.    Neurological:         LUE numbness and weakness   Hematological: Negative.    Psychiatric/Behavioral: Negative.       Objective:     Vitals:    Temp: 98.7 °F (37.1 °C)  Pulse: 79  Rhythm: atrial rhythm  BP: (!) 143/67  MAP (mmHg): 96  Resp: (!) 23  SpO2: 98 %    Temp  Min: 97.9 °F (36.6 °C)  Max: 98.9 °F (37.2 " °C)  Pulse  Min: 60  Max: 81  BP  Min: 143/67  Max: 244/109  MAP (mmHg)  Min: 96  Max: 207  Resp  Min: 17  Max: 31  SpO2  Min: 96 %  Max: 100 %    01/10 0701 - 01/11 0700  In: -   Out: 250 [Urine:250]            Physical Exam  Vitals and nursing note reviewed.   Constitutional:       General: He is not in acute distress.     Appearance: He is obese.   HENT:      Head: Normocephalic and atraumatic.      Nose: Nose normal.      Mouth/Throat:      Mouth: Mucous membranes are moist.      Pharynx: Oropharynx is clear.   Eyes:      Pupils: Pupils are equal, round, and reactive to light.   Cardiovascular:      Rate and Rhythm: Normal rate. Rhythm irregular.      Pulses: Normal pulses.   Pulmonary:      Effort: Pulmonary effort is normal.   Abdominal:      General: There is no distension.      Palpations: Abdomen is soft.      Tenderness: There is no abdominal tenderness.   Musculoskeletal:         General: No swelling.      Cervical back: Normal range of motion.   Skin:     General: Skin is warm and dry.      Capillary Refill: Capillary refill takes less than 2 seconds.   Neurological:      Mental Status: He is alert.      Comments:   E4 V5 M6  Alert. Oriented x4. Minimal dysarthria. No aphasia. Following commands.   PERRLA. EOMI. Visual fields intact   No facial asymmetry.  Tongue midline. Shoulder shrug symmetric.  Motor:  RUE 5/5  RLE 5/5  LUE 4/5  LLE 5/5  No drift   Coordination intact, slightly delayed movements  Sensation intact on R side. LUE numbness, could not tell I was touching his L arm with eyes closed.   Psychiatric:         Mood and Affect: Mood normal.         Behavior: Behavior normal.         Thought Content: Thought content normal.         Judgment: Judgment normal.       Gait deferred.       Today I personally reviewed pertinent medications, lines/drains/airways, imaging, cardiology results, laboratory results, microbiology results,:

## 2025-01-11 NOTE — PROGRESS NOTES
[facility] Medicine  Progress Note    Patient Name: Driss Hernandez Jr.  MRN: [id number]  Patient Class: IP- Inpatient   Admission Date: 1/9/2025  Length of Stay: 1 days  Attending Physician: Keyur Mello MD  Primary Care Provider: Jono Willoughby MD        Subjective     Principal Problem:Encephalopathy        HPI:  Driss Hernandez Jr. is a 74 y.o. male with  Several spinal and balance issues, Chronic pain on oxycodone, Non-Insulin-dependent DM2 with neuropathic complications, CAD s/p CABG, HTN, HLD, AFib on Eliquis, and CKD 3  who presented to [facility] ED on 01/09/2025 for eval and treatment of a fall and near syncopal event.  Per EMS, pt was at home using the bathroom when he felt light-headed and had a syncopal episode. He was found by a family member on the ground and has been confused since the fall. Reports pt was hypertensive with /100 but vitals otherwise normal. Pt reports he sustained a small wound on his abdomen. He states he feels a little confused, but denies any headache, nausea, vomiting, CP, SOB, leg pain or numbness, neck pain, dysuria, visual disturbances, speech difficulty, hearing loss, cough, eye pain, sore throat, rhinorrhea, fever or other associated symptoms. He does report some back pain at his surgical site.    ED course notable for the following: Confused, and kept trying to stand up to leave.  Hypertensive and bradycardic to the 40s.  Exam with abrasion on forehead.  Labs BUN 28 Crt 1.5 eGFR 48 UA w hyaline casts.  EKG w bradycardia and AFib w slow ventricular response.  Imaging: CTH without acute intracranial abnormality.  ED therapy/stabilization measures: pain control.  They were placed in observation under the care of [facility] for further evaluation and treatment.        Overview/Hospital Course:  Driss Hernandez Jr. 74 y.o. male placed in observation for fall at home and associated altered mental status.  He  has been after a fall versus near syncopal episode that has unwitnessed by his family.  In the ED his CTA was without acute intracranial abnormality his CT lumbar spine and no evidence of osseous traumatic injury.  He had difficulty ambulating.  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 No evidence of stenosis or thombosis on CTA. Resumed on eliquis. Seen by PT/OT and recommended for HH.     Interval History: reports intermittent back pain, but well controlled.   Review of Systems   Constitutional:  Negative for chills and fever.   Eyes:  Negative for photophobia and visual disturbance.   Respiratory:  Negative for chest tightness and shortness of breath.    Cardiovascular:  Negative for chest pain and palpitations.   Gastrointestinal:  Negative for abdominal pain, nausea and vomiting.   Genitourinary:  Negative for dysuria and hematuria.     Objective:     Vital Signs (Most Recent):  Temp: 97.9 °F (36.6 °C) (01/11/25 0727)  Pulse: 60 (01/11/25 0727)  Resp: 19 (01/11/25 0727)  BP: (!) 189/85 (01/11/25 0727)  SpO2: 98 % (01/11/25 0727) Vital Signs (24h Range):  Temp:  [97.9 °F (36.6 °C)-98.4 °F (36.9 °C)] 97.9 °F (36.6 °C)  Pulse:  [60-79] 60  Resp:  [18-26] 19  SpO2:  [97 %-100 %] 98 %  BP: (159-221)/(72-98) 189/85     Weight: 79.8 kg (175 lb 14.8 oz)  Body mass index is 27.55 kg/m².    Intake/Output Summary (Last 24 hours) at 1/11/2025 0854  Last data filed at 1/11/2025 0005  Gross per 24 hour   Intake --   Output 250 ml   Net -250 ml         Physical Exam  Vitals and nursing note reviewed.   Constitutional:       General: He is not in acute distress.     Appearance: He is well-developed. He is not diaphoretic.   HENT:      Head: Normocephalic and atraumatic.      Right Ear: External ear normal.      Left Ear: External ear normal.   Eyes:      General:         Right eye: No discharge.         Left eye: No discharge.      Conjunctiva/sclera: Conjunctivae normal.   Neck:      Thyroid: No thyromegaly.    Cardiovascular:      Rate and Rhythm: Normal rate and regular rhythm.      Heart sounds: No murmur heard.  Pulmonary:      Effort: Pulmonary effort is normal. No respiratory distress.      Breath sounds: Normal breath sounds.   Abdominal:      General: Bowel sounds are normal. There is no distension.      Palpations: Abdomen is soft. There is no mass.      Tenderness: There is no abdominal tenderness.   Musculoskeletal:         General: No deformity.      Cervical back: Normal range of motion and neck supple.      Right lower leg: No edema.      Left lower leg: No edema.   Skin:     General: Skin is warm and dry.   Neurological:      Mental Status: He is alert.      Sensory: No sensory deficit.      Comments: Oriented to self, place, but no year. Reports new years just past.    Psychiatric:         Mood and Affect: Mood normal.         Behavior: Behavior normal.             Significant Labs: CBC:   Recent Labs   Lab 01/09/25  1113 01/10/25  0328 01/11/25  0431   WBC 8.80 7.76 6.38   HGB 12.7* 13.4* 12.7*   HCT 41.3 42.8 40.9    210 215     CMP:   Recent Labs   Lab 01/09/25  1113 01/10/25  0328 01/11/25  0431    140 137   K 4.6 4.5 4.5    111* 107   CO2 25 20* 21*   GLU 76 76 72   BUN 28* 24* 21   CREATININE 1.5* 1.1 1.1   CALCIUM 9.0 9.4 9.4   PROT 6.8 6.4 6.4   ALBUMIN 3.4* 3.3* 3.1*   BILITOT 0.7 0.8 1.1*   ALKPHOS 27* 26* 29*   AST 19 16 19   ALT 14 14 14   ANIONGAP 8 9 9     TSH:   Recent Labs   Lab 01/10/25  0328   TSH 8.716*     Urine Studies:   Recent Labs   Lab 01/09/25  1619   COLORU Yellow   APPEARANCEUA Clear   PHUR >8.0*   SPECGRAV 1.015   PROTEINUA 1+*   GLUCUA 2+*   KETONESU Negative   BILIRUBINUA Negative   OCCULTUA Negative   NITRITE Negative   UROBILINOGEN 1.0   LEUKOCYTESUR Negative   RBCUA 5*   WBCUA 4   BACTERIA Occasional   HYALINECASTS 2*       Significant Imaging:   Imaging Results              CTA Head and Neck (xpd) (Final result)  Result time 01/10/25 19:34:15       Final result by Karen Piedra MD (01/10/25 19:34:15)                   Impression:      1. Bilateral parietal lobe regions of acute infarction, as seen on earlier MRI.  2. CTA head and neck with no evidence of high-grade stenosis, large vessel occlusion, or dissection.  3. Approximately 50% stenosis at the right ICA origin.  4. Indeterminate small bilateral parotid enhancing lesions.      Electronically signed by: Karen Piedra MD  Date:    01/10/2025  Time:    19:34               Narrative:    EXAMINATION:  CTA HEAD AND NECK (XPD)    CLINICAL HISTORY:  Stroke/TIA, determine embolic source;    TECHNIQUE:  Non contrast low dose axial images were obtained through the head.  CT angiogram was performed from the level of the guerrero to the top of the head following the IV administration of 100mL of Omnipaque 350.   Sagittal and coronal reconstructions and maximum intensity projection reconstructions were performed. Arterial stenosis percentages are based on NASCET measurement criteria.  Rapid AI screening was performed.    COMPARISON:  MRI/MRA brain and neck from the same date.    FINDINGS:  Abnormal regions of parenchymal hypoattenuation are seen involving the bilateral parietal lobes consistent with acute infarction as evidenced on earlier MRI.  No evidence of intracranial or intraparenchymal hemorrhage.  The ventricular system is normal in size and configuration with no evidence of hydrocephalus. No effacement of the skull-base cisterns.  Visualized paranasal sinuses and mastoid air cells are clear. The calvarium shows no significant abnormality.    CTA Neck: Atherosclerotic plaquing of the arch and origin of the great vessels without significant focal stenosis.  The origins of the right brachiocephalic, left common carotid and left subclavian arteries from the arch are within normal limits.  The origins of the vertebral arteries are within normal limits.  The vertebral arteries are patent without evidence for  focal stenosis or occlusion.   The bilateral common carotid arteries and internal carotid arteries are patent without evidence for high-grade focal stenosis or occlusion.  Plaquing is seen at the bilateral carotid bifurcations with approximately 50% stenosis seen at the right ICA origin.  No evidence of carotid or vertebral artery dissection.    Anterior circulation: The bilateral distal cervical, petrous, cavernous, and supraclinoid ICAs are patent without significant stenosis or aneurysm.  Significant atherosclerotic plaquing of the cavernous segments of the ICAs.  The anterior and middle cerebral arteries are patent without significant stenosis, occlusion, or aneurysm.    Posterior circulation: Distal vertebral arteries, basilar artery and posterior cerebral arteries are patent without significant focal stenosis, occlusion, or aneurysm.    Airways: Unremarkable.    Glands/Nodes: The submandibular glands are unremarkable.  Small bilateral parotid gland enhancing lesions are seen, the largest of which measures 1.5 cm on the left.  There is a 1.1 cm posterior right thyroid lobe hypodense nodule.    Spine: Multilevel degenerative changes are seen throughout the cervical spine and upper visualized thoracic spine.  No acute cervical spine abnormalities identified.    Lungs: Small amount of partially visualized layering left pleural fluid is seen.  Visualized lung are otherwise clear.                                        MRA Brain without contrast (Final result)  Result time 01/10/25 17:44:28      Final result by Milo Hay MD (01/10/25 17:44:28)                   Impression:      1. There is diminished flow signal and luminal narrowing in the distal MCA distribution bilaterally in the posterior parietal region in an area of infarction.  Vascular consultation and follow-up recommended.  CTA may better characterize.  2. There is bilateral flow signal loss in the distal vertebral arteries bilaterally of  indeterminate etiology.  This could be artifact.  High-grade narrowing/diminished flow of the distal V4 segment of the left vertebral artery is a possibility.  Mild narrowing or diminished flow of the V4 segment of the right vertebral artery is not excluded also.  Basilar artery demonstrates normal flow signal.  CTA may better characterize.  Vascular consultation and follow-up recommended.  3. Posterior cerebral arteries are patent proximally bilaterally.  P1 and P2 segments are adequately demonstrated.  Probable mild narrowing in the P2 segment on the left.. Possible mild luminal narrowing and diminished flow signal in the more distal components of the PCA distribution bilaterally. CTA may better characterize.  4. This report was flagged in Epic as abnormal.      Electronically signed by: Milo De Jesus  Date:    01/10/2025  Time:    17:44               Narrative:    EXAMINATION:  MRA BRAIN WITHOUT CONTRAST    CLINICAL HISTORY:  Syncope, simple, abnormal neuro exam; .    TECHNIQUE:  Non-contrast 3-D time-of-flight intracranial MR angiography was performed through the [place] with MIP reformatting.    COMPARISON:  None.    FINDINGS:  Anterior intracranial circulation: The anterior cerebral arteries appear adequately maintained throughout the course.    The middle cerebral arteries appear adequately maintained in the M1 and M2 segments.  There is diminished size of the distal MCA distribution branches in the bilateral posterior parietal region could be associated with thrombosis/partial thrombosis.  Acute infarction is noted in these areas on MRI.  Recommend vascular consultation and follow-up.    CTA may better characterize.    There is bilateral flow signal loss in the distal vertebral arteries bilaterally of indeterminate etiology.  This could be artifact.    High-grade narrowing/diminished flow of the distal V4 segment of the left vertebral artery is a possibility.    Mild narrowing or diminished flow of  the V4 segment of the right vertebral artery is not excluded also.    Basilar artery demonstrates normal flow signal.    CTA may better characterize.    Posterior cerebral arteries are patent proximally bilaterally.  P1 and P2 segments are adequately demonstrated.  Probable mild narrowing in the P2 segment on the left..  Possible mild luminal narrowing and diminished flow signal in the more distal components of the PCA distribution bilaterally.  CTA may better characterize.    Distal internal carotid arteries are patent bilaterally.  Mild signal loss in the region of the cavernous internal carotid arteries bilaterally.  CTA may better characterize.    Posterior intracranial circulation: No high-grade focal stenosis, occlusion, aneurysm, or vascular malformation.                                        MRA Neck without contrast (Final result)  Result time 01/10/25 17:40:26      Final result by Milo Hay MD (01/10/25 17:40:26)                   Impression:      Limited study.  High-grade narrowing of the proximal internal carotid arteries bilaterally is a consideration.  CTA would better characterize.    There is flow signal within the distal vertebral arteries bilaterally though significant narrowing of the V4 segments bilaterally left greater than right are possibilities.  CTA would better characterize.    This report was flagged in Epic as abnormal.      Electronically signed by: Milo Hay  Date:    01/10/2025  Time:    17:40               Narrative:    EXAMINATION:  MRA NECK WITHOUT CONTRAST    CLINICAL HISTORY:  Syncope, simple, abnormal neuro exam;    TECHNIQUE:  Noncontrast MRA of the neck.  Non-contrast 2D and/or 3D time-of-flight MR angiography of the neck was performed, with MIP reformatting.  No axial source images.  Large field-of-view coronal images.    COMPARISON:  None    FINDINGS:  Extracranial carotid circulation: Limited study with possible high-grade narrowing the proximal left ICA.   Questionable significant narrowing the proximal right ICA also.  Limited visualization.  CTA may better characterize.    Extracranial vertebral circulation: Probable narrowing of the distal V4 segments of the vertebral arteries left greater than right.  Limited visualization.  CT may better characterize.    No axial source images.    Skull/Extracranial Contents (limited evaluation): Bone marrow signal intensity is normal.                                        MRI Brain Without Contrast (Final result)  Result time 01/10/25 17:14:53      Final result by Milo Hay MD (01/10/25 17:14:53)                   Impression:      1. Bilateral posterior parietal cortical infarcts right greater than left.  Recommend follow-up.  2. This report was flagged in Epic as abnormal.      Electronically signed by: Milo Hay  Date:    01/10/2025  Time:    17:14               Narrative:    EXAMINATION:  MRI BRAIN WITHOUT CONTRAST    CLINICAL HISTORY:  Stroke, follow up;.    TECHNIQUE:  Multiplanar multisequence MR imaging of the brain was performed without contrast.    COMPARISON:  01/09/2025    FINDINGS:  Intracranial compartment:    No midline shift or acute hydrocephalus.  No extra-axial blood or fluid collections.    Diffusion weighted imaging demonstrates restricted diffusion in the bilateral posterior parietal cortex, right greater than left consistent with acute infarction.  Follow-up recommended.    No mass lesion, acute hemorrhage, edema or acute infarct.    Normal vascular flow voids are preserved.    Skull/extracranial contents (limited evaluation): Bone marrow signal intensity is normal.                                       CT Lumbar Spine Without Contrast (Final result)  Result time 01/09/25 16:33:02      Final result by Manas Sorenson MD (01/09/25 16:33:02)                   Impression:      No CT evidence of lumbar spine acute osseous traumatic injury.    Lumbar spondylosis most prominent at L2-3, L4-5 and  L5-S1 levels, as further outlined in the body of the report.    Other incidental/nonemergent findings in the body of the report.      Electronically signed by: Manas Sorenson MD  Date:    01/09/2025  Time:    16:33               Narrative:    EXAMINATION:  CT LUMBAR SPINE WITHOUT CONTRAST    CLINICAL HISTORY:  Low back pain, trauma;    TECHNIQUE:  Low-dose axial, sagittal and coronal reformations are obtained through the lumbar spine.  Contrast was not administered.    COMPARISON:  Lumbar spine series 12/12/2024, MRI lumbar spine 07/17/2023, CT abdomen and pelvis 06/24/2023    FINDINGS:  Five non-rib-bearing lumbar type vertebral bodies.  Mild straightening of the usual lumbar lordosis.  Similar chronic minimal anterior wedge deformity of L1 vertebral body.  No evidence for acute vertebral compression fracture.  No displaced fracture, dislocation or significant listhesis.  Grossly stable nonspecific subcentimeter lucent lesion at the right iliac body near the SI joint.  No destructive osseous process.  No pars defect.  Multilevel degenerative disc disease with loss of disc height, endplate changes, marginal osteophytes with posterior disc osteophyte complexes and facet arthrosis most prominent at L4-5 level, not significantly progressed from most previous imaging.  Suspected multilevel small Schmorl's nodes, similar to prior.  No significant prevertebral soft tissue thickening.  No paraspinal mass or fluid collection.  No subcutaneous emphysema or radiopaque foreign body.    L1-2: Minimal bilateral neural foraminal narrowing, more so on the right.  No significant spinal canal stenosis.    L2-3: Posterior broad-based disc bulge resulting in mild acquired canal stenosis.  Mild bilateral neural foraminal narrowing, right more so than left.    L3-4: Posterior broad-based disc bulge resulting in minimal acquired canal stenosis.  Mild right and minimal left neural foraminal narrowing.    L4-5: Posterior disc osteophyte  complex resulting in at least moderate acquired canal stenosis.  Moderate bilateral neural foraminal narrowing, left more so than right.    L5-S1: Posterior disc osteophyte complex asymmetric to the right resulting in minimal acquired canal stenosis.  Moderate to severe bilateral foraminal narrowing, left more so than right.    Scattered atherosclerotic vascular calcifications.  No acute abnormality seen within the imaged abdomen or pelvis.  Age-related mild to moderate degenerative change at the bilateral SI joints.                                       CT Head Without Contrast (Final result)  Result time 01/09/25 12:09:59      Final result by Rivera Raza MD (01/09/25 12:09:59)                   Impression:      1. No definite acute intracranial findings by noncontrast CT.  2. Partially imaged soft tissue lesions within the superficial lobes of the bilateral parotid glands, with differential considerations to include intraparotid lymph nodes versus benign or malignant primary parotid neoplasms.  Further characterization with ultrasound and possible tissue sampling would be recommended.      Electronically signed by: Rivera Raza  Date:    01/09/2025  Time:    12:09               Narrative:    EXAMINATION:  CT HEAD WITHOUT CONTRAST    CLINICAL HISTORY:  Mental status change, unknown cause; Altered mental status, unspecified    TECHNIQUE:  Low dose axial images were obtained through the head.  Coronal and sagittal reformations were also performed. Contrast was not administered.    COMPARISON:  MRI of the brain performed 12/20/2024.    FINDINGS:  Blood: No acute intracranial hemorrhage.    Parenchyma: No definite loss of gray-white differentiation to suggest acute or subacute transcortical infarct. Generalized pattern of age-related parenchymal volume loss.  Nonspecific areas of white matter hypoattenuation may reflect sequela of chronic small vessel ischemic disease.    Ventricles/Extra-axial spaces: No  abnormal extra-axial fluid collection. Basal cisterns are patent.    Vessels: Moderate atherosclerotic calcifications.    Orbits: Status post bilateral lens replacements.    Scalp: Unremarkable.    Skull: There are no depressed skull fractures or destructive bone lesions.  Chronic appearing nasal bone deformity.    Sinuses and mastoids: Relatively minimal paranasal sinus mucosal thickening with small retention cysts.    Other findings: Partially imaged ovoid soft tissue lesions in the superficial lobes of bilateral parotid glands, measuring up to 11 mm on the right and 12 mm on the left (for example as seen on series 3, image 1).                                        Assessment and Plan     * Encephalopathy  Presented after a fall vs near syncope at home. Oriented to most topics of conversation, but disoriented to specifics and not baseline mental status per wife. CT head without abnormality. MRI with bilateral infarcts suspect cause of confusion. Possible embolic as had to stop eliquis for surgery. Discussed with neurology overnight and no acute intervention required.   Neuro consulted  Resume eliquis  Permissive HTN, will resume BP meds tomorrow AM  Echo without thrombus  EEG without active seizures.     CVA (cerebral vascular accident)  As above        Bradycardia  Per outpatient cardiology notes history of bradycardia. Not currently on BB outpt      Fall at home  Neuro checks q4h  PT OT-outpt HH        Impaired mobility and ADLs  PT/oT consulted      Longstanding persistent atrial fibrillation  Patient has paroxysmal (<7 days) atrial fibrillation. [id number] Score: 4. The patients heart rate in the last 24 hours is as follows:  Pulse  Min: 47  Max: 61     Antiarrhythmics       Anticoagulants       Plan  - Replete lytes with a goal of K>4, Mg >2  - Patient is anticoagulated, see medications listed above.  - Patient's afib is currently controlled  - resume eliquis, not on BB history of previous bradycardia per  "outpt cardio notes        Hx of CABG  HLD    Continue home statin      Posture imbalance  PT/OT consulted      Essential hypertension  Patient's blood pressure range in the last 24 hours was: BP  Min: 136/65  Max: 205/91.The patient's inpatient anti-hypertensive regimen is listed below:  Current Antihypertensives  nitroGLYCERIN SL tablet 0.4 mg, Every 5 min PRN, Sublingual  hydrALAZINE injection 10 mg, Every 6 hours PRN, Intravenous  hydrALAZINE tablet 50 mg, 2 times daily, Oral    Plan  - BP is controlled, no changes needed to their regimen  - continue home hydralazine    Mixed hyperlipidemia  Continue statin    Gastroesophageal reflux disease  Continue protonix    Type 2 diabetes mellitus with peripheral neuropathy  Last A1c reviewed-   Lab Results   Component Value Date    HGBA1C 6.3 (H) 12/30/2024     Home antihyperglycemic regimen: metformin, Ozempic, amaryl    No results for input(s): "POCTGLUCOSE" in the last 72 hours.  Most recent inpatient antihyperglycemic regimen:   Antihyperglycemics (From admission, onward)      Start     Stop Route Frequency Ordered    01/10/25 0124  insulin aspart U-100 pen 0-5 Units         -- SubQ Every 4 hours PRN 01/10/25 0024          Goal blood glucose range while admitted: 140-180 per the NICE-SUGAR trial and American Diabetes Association guidelines  Diabetic diet 2000 jim  Rechecking A1c if most recent result above > 6 months ago  Diabetic counseling and education (eg nutrition, insulin) to be given prior to discharge              VTE Risk Mitigation (From admission, onward)           Ordered     apixaban tablet 5 mg  2 times daily         01/10/25 2224     Reason for No Pharmacological VTE Prophylaxis  Once        Question:  Reasons:  Answer:  Risk of Bleeding    01/09/25 1911     IP VTE HIGH RISK PATIENT  Once         01/09/25 1911     Place sequential compression device  Until discontinued         01/09/25 1911                    Discharge Planning   SCOTT:  1/12/2025    Code " Status: Full Code   Medical Readiness for Discharge Date:   Discharge Plan A: Home with family          Please place Justification for DME      Biju Talavera PA-C  Department of Hospital Medicine   [facility]

## 2025-01-11 NOTE — NURSING
Notified by nurse Mckenna that patient family reporting slurred speech. Provider notified, came to bedside.

## 2025-01-11 NOTE — NURSING
Plavix given, see MAR, no swallowing difficulties noted. Patient able to stand at bedside, minimal assist, then back to bed. Prn norco given for back pain, See MAR. Granddaughters at bedside.

## 2025-01-11 NOTE — ASSESSMENT & PLAN NOTE
74M. Hx HTN, HLD, Afib on Eliuquis, CAD s/p CABG, CKD3, insulin dependent diabetes. Admitted to [facility] overnight 1/9 to 1/10 following a syncopal episode w/fall, as well as encephalopathy. Seen by tele-neurology. Recommended MRI (demonstrated bilateral MCA/PCA watershed infarcts), MRA (demonstrated diminished flow in the distal BL MCA), EEG (posterior slowing), and CTA head/neck (unrevealing on 1/10). Patient exhibited an acute change this morning around 10AM, described as left hemiplegia and dysarthria. Stroke code called, and repeat CTA demonstrated R M2 occlusion. Patient transferred to [facility] for neuro-IR capacity.     Taken for angiogram. DSA demonstrated recanalization of R M2, no intervention performed. Admitted to [facility] for post-procedure monitoring.    Antithrombotics for secondary stroke prevention: Anticoagulants: Apixaban 5 mg BID     Statins for secondary stroke prevention and hyperlipidemia, if present:   Statins: Atorvastatin- 80 mg daily    Aggressive risk factor modification: HTN, DM, HLD, CAD     Rehab efforts: The patient has been evaluated by a stroke team provider and the therapy needs have been fully considered based off the presenting complaints and exam findings. The following therapy evaluations are needed: PT evaluate and treat, OT evaluate and treat, SLP evaluate and treat, PM&R evaluate for appropriate placement    Diagnostics ordered/pending: None     VTE prophylaxis: Mechanical prophylaxis: Place SCDs    BP parameters: Infarct: Post unsucessful thrombectomy, SBP <220

## 2025-01-11 NOTE — HPI
74M. Hx HTN, HLD, Afib on Eliuquis, CAD s/p CABG, CKD3, insulin dependent diabetes. Admitted to [facility] overnight 1/9 to 1/10 following a syncopal episode w/fall, as well as encephalopathy. Seen by tele-neurology. Recommended MRI (demonstrated bilateral MCA/PCA watershed infarcts), MRA (demonstrated diminished flow in the distal BL MCA), EEG (posterior slowing), and CTA head/neck (unrevealing on 1/10). Patient exhibited an acute change this morning around 10AM, described as left hemiplegia and dysarthria. Stroke code called, and repeat CTA demonstrated R M2 occlusion. [place] [facility] [place].

## 2025-01-11 NOTE — CONSULTS
[facility] Neuro Critical Care  Vascular Neurology  Comprehensive Stroke Center  Consult Note    Inpatient consult to Vascular (Stroke) Neurology  Consult performed by: John Dorantes DO  Consult ordered by: Delbert Zamora, [facility]        Assessment/Plan:     Patient is a 74 y.o. year old male with:    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  74M. Hx HTN, HLD, Afib on Eliuquis (last received on 1/11AM), CAD s/p CABG, CKD3, insulin dependent diabetes. Admitted to [facility] overnight 1/9 to 1/10 following a syncopal episode w/fall, as well as encephalopathy. Seen by tele-neurology. Recommended MRI (demonstrated bilateral MCA/PCA watershed infarcts), MRA (demonstrated diminished flow in the distal BL MCA), EEG (posterior slowing), and CTA head/neck (unrevealing on 1/10). Patient exhibited an acute change this morning around 10AM, described as left hemiplegia and dysarthria. Stroke code called, and repeat CTA demonstrated R M2 occlusion. [facility].     NIHSS on arrival of 8 (left-sided inattention/neglect, dysarthria, LUE drift). Taken for angiogram. DSA demonstrated recanalization of R M2, no intervention performed. Admitted to [facility] for post-procedure monitoring.    Antithrombotics for secondary stroke prevention: Anticoagulants: OK to restart Apixaban 5 mg BID     Statins for secondary stroke prevention and hyperlipidemia, if present:   Statins: Atorvastatin- 80 mg daily    Aggressive risk factor modification: HTN, DM, HLD, CAD     Rehab efforts: The patient has been evaluated by a stroke team provider and the therapy needs have been fully considered based off the presenting complaints and exam findings. The following therapy evaluations are needed: PT evaluate and treat, OT evaluate and treat, SLP evaluate and treat, PM&R evaluate for appropriate placement    Diagnostics ordered/pending: None     VTE prophylaxis: Mechanical prophylaxis: Place SCDs    BP  parameters: Infarct: Post unsucessful thrombectomy, SBP <220            STROKE DOCUMENTATION     Acute Stroke Times   Last Known Normal Date: 01/11/25  Last Known Normal Time: 0950  Symptom Onset Date: 01/11/25  Symptom Onset Time: 0950  Stroke Team Called Date: 01/11/25  Stroke Team Called Time: 1320  Stroke Team Arrival Date: 01/11/25  Stroke Team Arrival Time: 1320  CT Interpretation Time:  (done at OSH)  Thrombolytic Therapy Recommended: No  Thrombectomy Recommended: Yes  Decision to Treat Time for IR: 1140 (Made at OSH)    NIH Scale:  1a. Level of Consciousness: 0-->Alert, keenly responsive  1b. LOC Questions: 1-->Answers one question correctly  1c. LOC Commands: 0-->Performs both tasks correctly  2. Best Gaze: 0-->Normal  3. Visual: 1-->Partial hemianopia  4. Facial Palsy: 1-->Minor paralysis (flattened nasolabial fold, asymmetry on smiling)  5a. Motor Arm, Left: 1-->Drift, limb holds 90 (or 45) degrees, but drifts down before full 10 seconds, does not hit bed or other support  5b. Motor Arm, Right: 0-->No drift, limb holds 90 (or 45) degrees for full 10 secs  6a. Motor Leg, Left: 0-->No drift, leg holds 30 degree position for full 5 secs  6b. Motor Leg, Right: 0-->No drift, leg holds 30 degree position for full 5 secs  7. Limb Ataxia: 0-->Absent  8. Sensory: 1-->Mild-to-moderate sensory loss, patient feels pinprick is less sharp or is dull on the affected side, or there is a loss of superficial pain with pinprick, but patient is aware of being touched  9. Best Language: 1-->Mild-to-moderate aphasia, some obvious loss of fluency or facility of comprehension, without significant limitation on ideas expressed or form of expression. Reduction of speech and/or comprehension, however, makes conversation. . . (see row details)  10. Dysarthria: 1-->Mild-to-moderate dysarthria, patient slurs at least some words and, at worst, can be understood with some difficulty  11. Extinction and Inattention (formerly Neglect):  1-->Visual, tactile, auditory, spatial, or personal inattention or extinction to bilateral simultaneous stimulation in one of the sensory modalities  Total (NIH Stroke Scale): 8    Modified Angelica Score: 4  Derick Coma Scale:14   ABCD2 Score:    [id number] Score:   HAS -BLED Score:   ICH Score:   Hunt & Zamudio Classification:       Thrombolysis Candidate? No, received Eliquis on 1/11AM    Delays to Thrombolysis?  Not Applicable    Interventional Revascularization Candidate?   Is the patient eligible for mechanical endovascular reperfusion (FERMÍN)?  Yes    Delays to Thrombectomy? Not Applicable    Hemorrhagic change of an Ischemic Stroke: Does this patient have an ischemic stroke with hemorrhagic changes? No     Subjective:     History of Present Illness:  74M. Hx HTN, HLD, Afib on Eliuquis, CAD s/p CABG, CKD3, insulin dependent diabetes. Admitted to [facility] overnight 1/9 to 1/10 following a syncopal episode w/fall, as well as encephalopathy. Seen by tele-neurology. Recommended MRI (demonstrated bilateral MCA/PCA watershed infarcts), MRA (demonstrated diminished flow in the distal BL MCA), EEG (posterior slowing), and CTA head/neck (unrevealing on 1/10). Patient exhibited an acute change this morning around 10AM, described as left hemiplegia and dysarthria. Stroke code called, and repeat CTA demonstrated R M2 occlusion. Patient [place] [facility] [place]-IR capacity.          Past Medical History:   Diagnosis Date    Chronic heart failure with preserved ejection fraction 2/9/2023    Chronic midline low back pain without sciatica 10/2/2017    Colon polyps     Coronary artery disease involving native coronary artery of native heart 3/5/2020    Coronary artery disease involving native coronary artery of native heart with angina pectoris 12/10/2020    CVA (cerebral vascular accident) 1/11/2025    Diabetes mellitus with neurological manifestations, uncontrolled 1/24/2017    Diabetic polyneuropathy associated  with type 2 diabetes mellitus 1/24/2017    Diabetic polyneuropathy associated with type 2 diabetes mellitus     Encephalopathy 1/10/2025    Essential hypertension 1/24/2017    Gastroesophageal reflux disease 1/24/2017    Hyperlipidemia 1/24/2017    Insomnia 1/24/2017    Long-term insulin use 1/24/2017    Longstanding persistent atrial fibrillation 12/30/2020    Lumbar spondylosis 11/13/2017    Nuclear sclerosis of both eyes 8/24/2017    Obesity     PAD (peripheral artery disease) 3/23/2022    Stage 3 chronic kidney disease 2/13/2019    Uncontrolled type 2 diabetes mellitus without complication, with long-term current use of insulin 1/24/2017     Past Surgical History:   Procedure Laterality Date    ARTHROSCOPIC REPAIR OF ROTATOR CUFF OF SHOULDER Right 7/5/2022    Procedure: REPAIR, ROTATOR CUFF, ARTHROSCOPIC;  Surgeon: Valerie Olivera MD;  Location: [facility] OR;  Service: Orthopedics;  Laterality: Right;  ANAYELI JANEE SAADIA LEMUS [phone]/ TEXTED ALLAN ON 6/23/2022 @ 9:47AM. ALLAN RESPONDED ON 6/23/2022 @ 10:33AM-  JEAN PAUL SALAZARTIER [phone] MY BE HERE FOR CASE SPOKE TO HIM @ 9:59AM ON 7-1-2022  RN PREOP 6/28/2022    BACK SURGERY      2002    CATARACT EXTRACTION W/  INTRAOCULAR LENS IMPLANT Right 08/29/2017    Dr. Hong    CATARACT EXTRACTION W/  INTRAOCULAR LENS IMPLANT Left 02/24/2022    Dr. Hong    CAUDAL EPIDURAL STEROID INJECTION N/A 9/25/2024    Procedure: Caudal Epidural Steroid Injection;  Surgeon: Eze Byrd Jr., MD;  Location: [facility] PAIN MANAGEMENT;  Service: Pain Management;  Laterality: N/A;  @1100(prev. 715)  Eliquis last 9/21  Plavix last 9/19  Check BG  E-Consent    COLONOSCOPY N/A 2/21/2017    Procedure: COLONOSCOPY;  Surgeon: Robb Rosado MD;  Location: [facility] ENDO;  Service: Endoscopy;  Laterality: N/A;    COLONOSCOPY N/A 7/5/2023    Procedure: COLONOSCOPY;  Surgeon: Aston Varela MD;  Location: [facility] ENDO;  Service: Endoscopy;  Laterality: N/A;  Referral: [facility],  JOVANNI MARC  ok to hold Plavix 5 days and Eliquis 2 days per Dr Purdy-OFELIA   Meds  Suprep   Inst portal   LW    CORONARY ANGIOGRAPHY INCLUDING BYPASS GRAFTS WITH CATHETERIZATION OF LEFT HEART N/A 11/11/2020    Procedure: ANGIOGRAM, CORONARY, INCLUDING BYPASS GRAFT, WITH LEFT HEART CATHETERIZATION;  Surgeon: Lane Cuellar MD;  Location: [facility] CATH LAB;  Service: Cardiology;  Laterality: N/A;  RN PRE OP 11-5-2020. --COVID NEGATIVE ON  11-. C A    CORONARY ARTERY BYPASS GRAFT      March 2016    EPIDURAL STEROID INJECTION Bilateral 11/14/2018    Procedure: Lumbar Medial Branch Blocks;  Surgeon: Eze Byrd Jr., MD;  Location: [facility] ENDO;  Service: Pain Management;  Laterality: Bilateral;  Bilateral Lumbar Medial Branch Blocks L2, L3, L4, L5    46341  [id number]    Arrive @ 1150; NO Sedation    EPIDURAL STEROID INJECTION Bilateral 11/28/2018    Procedure: Injection, Steroid, Epidural;  Surgeon: Eze Byrd Jr., MD;  Location: [facility];  Service: Pain Management;  Laterality: Bilateral;  Bilateral Sacroiliac Joint Steroid Injections    [id number]    Arrive @ 0910    EPIDURAL STEROID INJECTION Bilateral 3/20/2019    Procedure: Injection, Steroid, Sacroiliiac Joint;  Surgeon: Eze Byrd Jr., MD;  Location: [facility];  Service: Pain Management;  Laterality: Bilateral;  Bilateral Sacroiliac Joint Steroid Injections    [id number]    Arrive @ 1145; Trigger point injections also?    EPIDURAL STEROID INJECTION Right 8/2/2023    Procedure: Right L5 Transforaminal Epidural Steroid Injection;  Surgeon: Eze Byrd Jr., MD;  Location: [facility] ENDO;  Service: Pain Management;  Laterality: Right;  @0830 (given)  Eliquis last 7/29  Plavix last 7/27  Check BG    EPIDURAL STEROID INJECTION Right 10/11/2023    Procedure: Right L3 (infraneural) + S1 Transforaminal Epidural Steroid Injections;  Surgeon: Eze Byrd Jr., MD;  Location: [facility];  Service: Pain Management;  Laterality: Right;  @0745 (requests  morning)  Eliquis 10 & Plavix 10/  Check BG    ESOPHAGOGASTRODUODENOSCOPY N/A 2023    Procedure: EGD (ESOPHAGOGASTRODUODENOSCOPY);  Surgeon: Anita Oswald MD;  Location: [facility] ENDO;  Service: Endoscopy;  Laterality: N/A;  Procedure Timin-4 weeks  Provider: Any GI provider  Location: No Preference  Additional Scheduling Information: Blood thinners  Prep Specifications:N/A   ref. by Dr. Light, instr. to portal, , [facility] meds-st  ok to hold Plavix 5 days and Eliquis 2 day    INTRAOCULAR PROSTHESES INSERTION Left 2022    Procedure: INSERTION, IOL PROSTHESIS;  Surgeon: Nickolas Hong MD;  Location: [facility] OR;  Service: Ophthalmology;  Laterality: Left;    LAMINOFORAMINOTOMY OF SPINE USING MINIMALLY INVASIVE TECHNIQUE Right 2025    Procedure: LAMINOFORAMINOTOMY, SPINE, MINIMALLY INVASIVE;  Surgeon: Luis M Ellis MD;  Location: [facility] OR;  Service: Neurosurgery;  Laterality: Right;  Right L4/5 minimally invasive laminotomy/microdiscectomy. silvana table, lucio frame, fluoro, microscope, no vendor, no monitoring  CLEARED BY CARDS AND PCP   RN PREOP 24---T/S--done -----NEED ORDERS    PHACOEMULSIFICATION OF CATARACT Left 2022    Procedure: PHACOEMULSIFICATION, CATARACT;  Surgeon: Nickolas Hong MD;  Location: [facility] OR;  Service: Ophthalmology;  Laterality: Left;  RN Phone Pre Op 22.  Covid NEGATIVE  22.  Arrival 08:00 am.    TONSILLECTOMY       Social History     Tobacco Use    Smoking status: Never     Passive exposure: Never    Smokeless tobacco: Never   Substance Use Topics    Alcohol use: Not Currently    Drug use: No     Review of patient's allergies indicates:   Allergen Reactions    Penicillins Other (See Comments)     Unknown reaction, Had a reaction as a child    Shrimp Itching     Hand Itching       Medications: I have reviewed the current medication administration record.    (Not in a hospital admission)      Review of Systems   Unable to  "perform ROS: Acuity of condition     Objective:     Vital Signs (Most Recent):  Temp: 98.1 °F (36.7 °C) (01/11/25 1334)  Pulse: 75 (01/11/25 1334)  Resp: 20 (01/11/25 1334)  BP: (!) 178/110 (01/11/25 1334)  SpO2: 96 % (01/11/25 1334)    Vital Signs Range (Last 24H):  Temp:  [97.9 °F (36.6 °C)-98.9 °F (37.2 °C)]   Pulse:  [60-79]   Resp:  [17-31]   BP: (159-244)/()   SpO2:  [96 %-100 %]        Physical Exam  Vitals and nursing note reviewed.   HENT:      Head: Normocephalic and atraumatic.      Mouth/Throat:      Mouth: Mucous membranes are moist.   Cardiovascular:      Rate and Rhythm: Normal rate and regular rhythm.   Pulmonary:      Effort: Pulmonary effort is normal.   Skin:     General: Skin is warm and dry.              Neurological Exam:   LOC: alert  Attention Span: Good   Language: No aphasia  Articulation: Dysarthria  Orientation: Person, Place, Time   Visual Fields: Superior quadrantanopsia: left  EOM (CN III, IV, VI): Full/intact  Pupils (CN II, III): PERRL  Facial Sensation (CN V): Normal  Facial Movement (CN VII): Symmetric facial expression    Motor: Arm left  Paresis: 4/5  Leg left  Normal 5/5  Arm right  Normal 5/5  Leg right Normal 5/5  Sensation: Jan-anesthesia left and Tactile extinction to bilateral simultaneous stimulation       Laboratory:  CMP:   Recent Labs   Lab 01/11/25  0431   CALCIUM 9.4   ALBUMIN 3.1*   PROT 6.4      K 4.5   CO2 21*      BUN 21   CREATININE 1.1   ALKPHOS 29*   ALT 14   AST 19   BILITOT 1.1*     CBC:   Recent Labs   Lab 01/11/25  0431   WBC 6.38   RBC 4.88   HGB 12.7*   HCT 40.9      MCV 84   MCH 26.0*   MCHC 31.1*     Lipid Panel: No results for input(s): "CHOL", "LDLCALC", "HDL", "TRIG" in the last 168 hours.  Hgb A1C: No results for input(s): "HGBA1C" in the last 168 hours.  TSH:   Recent Labs   Lab 01/10/25  0328   TSH 8.716*       Diagnostic Results:      Brain and Vessel imaging:  MRI Brain: 1/10/25  Bilateral posterior parietal cortical " infarcts right greater than left.     MRA Head/Neck: 1/10/25  1. There is diminished flow signal and luminal narrowing in the distal MCA distribution bilaterally in the posterior parietal region in an area of infarction.  Vascular consultation and follow-up recommended.  CTA may better characterize.  2. There is bilateral flow signal loss in the distal vertebral arteries bilaterally of indeterminate etiology.  This could be artifact.  High-grade narrowing/diminished flow of the distal V4 segment of the left vertebral artery is a possibility.  Mild narrowing or diminished flow of the V4 segment of the right vertebral artery is not excluded also.  Basilar artery demonstrates normal flow signal.  CTA may better characterize.  Vascular consultation and follow-up recommended.  3. Posterior cerebral arteries are patent proximally bilaterally.  P1 and P2 segments are adequately demonstrated.  Probable mild narrowing in the P2 segment on the left.. Possible mild luminal narrowing and diminished flow signal in the more distal components of the PCA distribution bilaterally.    CTA Head and Neck: 1/10  1. Bilateral parietal lobe regions of acute infarction, as seen on earlier MRI.  2. CTA head and neck with no evidence of high-grade stenosis, large vessel occlusion, or dissection.  3. Approximately 50% stenosis at the right ICA origin.  4. Indeterminate small bilateral parotid enhancing lesions.    CTA Head and Neck: 1/11  Evolving parieto-occipital infarcts again identified.     Irregular calcified and soft plaque at the carotid bifurcations however less than 50% stenosis by NASCET criteria.     At least moderate stenosis at the origin of the vertebral arteries bilaterally.     Evaluation of the [id number] demonstrates a new right M2 branch occlusion, the occlusion is somewhat distal although the branch is quite large in size.    Cardiac Evaluation:   Echo: 1/10/25    Left Ventricle: The left ventricle is normal in  size. Mildly increased wall thickness. There is mild concentric hypertrophy. There is normal systolic function with a visually estimated ejection fraction of 55 - 60%. Unable to assess diastolic function due to atrial fibrillation.    Right Ventricle: Systolic function is normal.    Aortic Valve: The aortic valve is a trileaflet valve. There is moderate aortic valve sclerosis. Mildly restricted motion. There is mild stenosis. Aortic valve area by VTI is 1.9 cm². Aortic valve peak velocity is 1.5 m/s. Mean gradient is 5.3 mmHg. The dimensionless index is 0.59.    Tricuspid Valve: There is mild regurgitation.    Pulmonary Artery: The estimated pulmonary artery systolic pressure is 93 mmHg.    Pt in AFib throughout exam.    John Dorantes DO  Comprehensive Stroke Center  Department of Vascular Neurology   [facility]

## 2025-01-11 NOTE — ASSESSMENT & PLAN NOTE
History of  Takes Hydralazine 50 BID, holding for now  SBP <180  Cardene gtt  PRN Hydralazine and Labetalol

## 2025-01-11 NOTE — SIGNIFICANT EVENT
At 9:45 a.m. code stroke called over head due to new onset left-sided hemiparesis left-sided neglect aphasia and left facial droop which was not presented this morning at 8am.  He was brought to the CT scan where a CT non-con did not demonstrate hemorrhage.  A CTA head and neck was obtained which showed evidence of a new M2 occlusion that was not present on CTA on January 10th.  This was discussed with Dr. Viramontes and transfer to [facility] ED for thrombectomy was arranged.  At transfer he has left-sided neglect left-sided hemiparesis left facial droop and moderate expressive aphasia, but is protecting his airway and otherwise hemodynamically stable with appropriate hypertension with a BP of 206/86.    Critical care time spent on the evaluation and treatment of severe organ dysfunction, review of pertinent labs and imaging studies, discussions with consulting providers and discussions with patient/family: 100 minutes.    Biju Talavera PA-C  Department of Hospital Medicine   [facility]

## 2025-01-11 NOTE — TRANSFER OF CARE
"Anesthesia Transfer of Care Note    Patient: Driss Hernandez Jr.    Procedure(s) Performed: * No procedures listed *    Patient location: ICU    Anesthesia Type: general    Transport from OR: Transported from OR on 6-10 L/min O2 by face mask with adequate spontaneous ventilation. Continuous ECG monitoring in transport. Continuous SpO2 monitoring in transport. Continuos invasive BP monitoring in transport    Post pain: adequate analgesia    Post assessment: no apparent anesthetic complications    Post vital signs: stable    Level of consciousness: awake and alert    Nausea/Vomiting: no nausea/vomiting    Complications: none    Transfer of care protocol was followed    Last vitals: Visit Vitals  BP (!) 178/110   Pulse 75   Temp 36.7 °C (98.1 °F) (Oral)   Resp 20   Ht 5' 7" (1.702 m)   Wt 79.8 kg (175 lb 14.8 oz)   SpO2 96%   BMI 27.55 kg/m²     "

## 2025-01-11 NOTE — ASSESSMENT & PLAN NOTE
73 y/o M PMH HTN, HLD, Afib on Eliquis (last received on 1/11 AM), CAD s/p CABG, CKD3, insulin dependent diabetes. Admitted to [facility] overnight 1/9 to 1/10 following a syncopal episode w/ fall and encephalopathy. Seen by tele-neurology. Recommended MRI (demonstrated bilateral MCA/PCA watershed infarcts), MRA (demonstrated diminished flow in the distal BL MCA), EEG (posterior slowing), and CTA head/neck (unrevealing on 1/10). Patient exhibited an acute change this morning around 10AM, described as left hemiplegia and dysarthria. Stroke code called, and repeat CTA demonstrated R M2 occlusion. Patient was transferred to [facility] for higher level of care. NIHSS on arrival of 8 (left-sided inattention/neglect, dysarthria, LUE drift). Taken for DSA which showed recanalization of R M2, no intervention performed.     Admit to [facility]  Vascular Neurology and Neuro IR consulted   Q1 neuro checks, vitals, I/Os  EKG and CXR  ECHO completed at OSH  A1c, TSH, lipid panel, aPTT, PT/INR, T&S pending  CBC, CMP, Mag, and phos daily  SBP goal < 180  Cardene gtt  PRN labetalol and hydralazine  Statin   Restart Eliquis  Regular diet  PT/OT/SLP  VTE prophylaxis: mechanical, Eliquis

## 2025-01-11 NOTE — SUBJECTIVE & OBJECTIVE
Past Medical History:   Diagnosis Date    Chronic heart failure with preserved ejection fraction 2/9/2023    Chronic midline low back pain without sciatica 10/2/2017    Colon polyps     Coronary artery disease involving native coronary artery of native heart 3/5/2020    Coronary artery disease involving native coronary artery of native heart with angina pectoris 12/10/2020    CVA (cerebral vascular accident) 1/11/2025    Diabetes mellitus with neurological manifestations, uncontrolled 1/24/2017    Diabetic polyneuropathy associated with type 2 diabetes mellitus 1/24/2017    Diabetic polyneuropathy associated with type 2 diabetes mellitus     Encephalopathy 1/10/2025    Essential hypertension 1/24/2017    Gastroesophageal reflux disease 1/24/2017    Hyperlipidemia 1/24/2017    Insomnia 1/24/2017    Long-term insulin use 1/24/2017    Longstanding persistent atrial fibrillation 12/30/2020    Lumbar spondylosis 11/13/2017    Nuclear sclerosis of both eyes 8/24/2017    Obesity     PAD (peripheral artery disease) 3/23/2022    Stage 3 chronic kidney disease 2/13/2019    Uncontrolled type 2 diabetes mellitus without complication, with long-term current use of insulin 1/24/2017     Past Surgical History:   Procedure Laterality Date    ARTHROSCOPIC REPAIR OF ROTATOR CUFF OF SHOULDER Right 7/5/2022    Procedure: REPAIR, ROTATOR CUFF, ARTHROSCOPIC;  Surgeon: Valerie Olivera MD;  Location: [facility];  Service: Orthopedics;  Laterality: Right;  GUADALUPE AND NEPHJENNIFER LEMUS [phone]/ IRA LEMUS ON 6/23/2022 @ 9:47AM. ALLAN RESPONDED ON 6/23/2022 @ 10:33AM-  JEAN PAUL BABIN [phone] MY BE HERE FOR CASE SPOKE TO HIM @ 9:59AM ON 7-1-2022  RN PREOP 6/28/2022    BACK SURGERY      2002    CATARACT EXTRACTION W/  INTRAOCULAR LENS IMPLANT Right 08/29/2017    Dr. Hong    CATARACT EXTRACTION W/  INTRAOCULAR LENS IMPLANT Left 02/24/2022    Dr. Hong    CAUDAL EPIDURAL STEROID INJECTION N/A 9/25/2024    Procedure:  Caudal Epidural Steroid Injection;  Surgeon: Eze Byrd Jr., MD;  Location: [facility] PAIN MANAGEMENT;  Service: Pain Management;  Laterality: N/A;  @1100(prev. 715)  Eliquis last 9/21  Plavix last 9/19  Check BG  E-Consent    COLONOSCOPY N/A 2/21/2017    Procedure: COLONOSCOPY;  Surgeon: Robb Rosado MD;  Location: [facility] ENDO;  Service: Endoscopy;  Laterality: N/A;    COLONOSCOPY N/A 7/5/2023    Procedure: COLONOSCOPY;  Surgeon: Aston Varela MD;  Location: [facility] ENDO;  Service: Endoscopy;  Laterality: N/A;  Referral: JOVANNI SCANLON to hold Plavix 5 days and Eliquis 2 days per Dr Ford   Meds  Suprep   Inst portal   LW    CORONARY ANGIOGRAPHY INCLUDING BYPASS GRAFTS WITH CATHETERIZATION OF LEFT HEART N/A 11/11/2020    Procedure: ANGIOGRAM, CORONARY, INCLUDING BYPASS GRAFT, WITH LEFT HEART CATHETERIZATION;  Surgeon: Lane Cuellar MD;  Location: [facility] CATH LAB;  Service: Cardiology;  Laterality: N/A;  RN PRE OP 11-5-2020. --COVID NEGATIVE ON  11-. C A    CORONARY ARTERY BYPASS GRAFT      March 2016    EPIDURAL STEROID INJECTION Bilateral 11/14/2018    Procedure: Lumbar Medial Branch Blocks;  Surgeon: Eze Byrd Jr., MD;  Location: [facility];  Service: Pain Management;  Laterality: Bilateral;  Bilateral Lumbar Medial Branch Blocks L2, L3, L4, L5    90738  [id number]    Arrive @ 1150; NO Sedation    EPIDURAL STEROID INJECTION Bilateral 11/28/2018    Procedure: Injection, Steroid, Epidural;  Surgeon: Eze Byrd Jr., MD;  Location: [facility];  Service: Pain Management;  Laterality: Bilateral;  Bilateral Sacroiliac Joint Steroid Injections    [id number]    Arrive @ 0910    EPIDURAL STEROID INJECTION Bilateral 3/20/2019    Procedure: Injection, Steroid, Sacroiliiac Joint;  Surgeon: Eze Byrd Jr., MD;  Location: [facility];  Service: Pain Management;  Laterality: Bilateral;  Bilateral Sacroiliac Joint Steroid Injections    90740    Arrive @ 1145; Trigger point injections  also?    EPIDURAL STEROID INJECTION Right 2023    Procedure: Right L5 Transforaminal Epidural Steroid Injection;  Surgeon: Eze Byrd Jr., MD;  Location: [facility] ENDO;  Service: Pain Management;  Laterality: Right;  @0830 (given)  Eliquis last   Plavix last   Check BG    EPIDURAL STEROID INJECTION Right 10/11/2023    Procedure: Right L3 (infraneural) + S1 Transforaminal Epidural Steroid Injections;  Surgeon: Eze Byrd Jr., MD;  Location: [facility] ENDO;  Service: Pain Management;  Laterality: Right;  @0745 (requests morning)  Eliquis 10/7 & Plavix 10/5  Check BG    ESOPHAGOGASTRODUODENOSCOPY N/A 2023    Procedure: EGD (ESOPHAGOGASTRODUODENOSCOPY);  Surgeon: Anita Oswald MD;  Location: [facility] ENDO;  Service: Endoscopy;  Laterality: N/A;  Procedure Timin-4 weeks  Provider: Any GI provider  Location: No Preference  Additional Scheduling Information: Blood thinners  Prep Specifications:N/A   ref. by Dr. Light, instr. to portal, , [facility] meds-st  ok to hold Plavix 5 days and Eliquis 2 day    INTRAOCULAR PROSTHESES INSERTION Left 2022    Procedure: INSERTION, IOL PROSTHESIS;  Surgeon: Nickolas Hong MD;  Location: [facility] OR;  Service: Ophthalmology;  Laterality: Left;    LAMINOFORAMINOTOMY OF SPINE USING MINIMALLY INVASIVE TECHNIQUE Right 2025    Procedure: LAMINOFORAMINOTOMY, SPINE, MINIMALLY INVASIVE;  Surgeon: Luis M Ellis MD;  Location: [facility] OR;  Service: Neurosurgery;  Laterality: Right;  Right L4/5 minimally invasive laminotomy/microdiscectomy. silvana table, lucio frame, fluoro, microscope, no vendor, no monitoring  CLEARED BY CARDS AND PCP   RN PREOP 24---T/S--done -----NEED ORDERS    PHACOEMULSIFICATION OF CATARACT Left 2022    Procedure: PHACOEMULSIFICATION, CATARACT;  Surgeon: Nickolas Hong MD;  Location: [facility] OR;  Service: Ophthalmology;  Laterality: Left;  RN Phone Pre Op 22.  Covid NEGATIVE  22.  Arrival 08:00  am.    TONSILLECTOMY       Social History     Tobacco Use    Smoking status: Never     Passive exposure: Never    Smokeless tobacco: Never   Substance Use Topics    Alcohol use: Not Currently    Drug use: No     Review of patient's allergies indicates:   Allergen Reactions    Penicillins Other (See Comments)     Unknown reaction, Had a reaction as a child    Shrimp Itching     Hand Itching       Medications: I have reviewed the current medication administration record.    (Not in a hospital admission)      Review of Systems   Unable to perform ROS: Acuity of condition     Objective:     Vital Signs (Most Recent):  Temp: 98.1 °F (36.7 °C) (01/11/25 1334)  Pulse: 75 (01/11/25 1334)  Resp: 20 (01/11/25 1334)  BP: (!) 178/110 (01/11/25 1334)  SpO2: 96 % (01/11/25 1334)    Vital Signs Range (Last 24H):  Temp:  [97.9 °F (36.6 °C)-98.9 °F (37.2 °C)]   Pulse:  [60-79]   Resp:  [17-31]   BP: (159-244)/()   SpO2:  [96 %-100 %]        Physical Exam  Vitals and nursing note reviewed.   HENT:      Head: Normocephalic and atraumatic.      Mouth/Throat:      Mouth: Mucous membranes are moist.   Cardiovascular:      Rate and Rhythm: Normal rate and regular rhythm.   Pulmonary:      Effort: Pulmonary effort is normal.   Skin:     General: Skin is warm and dry.              Neurological Exam:   LOC: alert  Attention Span: Good   Language: No aphasia  Articulation: Dysarthria  Orientation: Person, Place, Time   Visual Fields: Superior quadrantanopsia: left  EOM (CN III, IV, VI): Full/intact  Pupils (CN II, III): PERRL  Facial Sensation (CN V): Normal  Facial Movement (CN VII): Symmetric facial expression    Motor: Arm left  Paresis: 4/5  Leg left  Normal 5/5  Arm right  Normal 5/5  Leg right Normal 5/5  Sensation: Jan-anesthesia left and Tactile extinction to bilateral simultaneous stimulation       Laboratory:  CMP:   Recent Labs   Lab 01/11/25  0431   CALCIUM 9.4   ALBUMIN 3.1*   PROT 6.4      K 4.5   CO2 21*     "  BUN 21   CREATININE 1.1   ALKPHOS 29*   ALT 14   AST 19   BILITOT 1.1*     CBC:   Recent Labs   Lab 01/11/25  0431   WBC 6.38   RBC 4.88   HGB 12.7*   HCT 40.9      MCV 84   MCH 26.0*   MCHC 31.1*     Lipid Panel: No results for input(s): "CHOL", "LDLCALC", "HDL", "TRIG" in the last 168 hours.  Hgb A1C: No results for input(s): "HGBA1C" in the last 168 hours.  TSH:   Recent Labs   Lab 01/10/25  0328   TSH 8.716*       Diagnostic Results:      Brain and Vessel imaging:  MRI Brain: 1/10/25  Bilateral posterior parietal cortical infarcts right greater than left.     MRA Head/Neck: 1/10/25  1. There is diminished flow signal and luminal narrowing in the distal MCA distribution bilaterally in the posterior parietal region in an area of infarction.  Vascular consultation and follow-up recommended.  CTA may better characterize.  2. There is bilateral flow signal loss in the distal vertebral arteries bilaterally of indeterminate etiology.  This could be artifact.  High-grade narrowing/diminished flow of the distal V4 segment of the left vertebral artery is a possibility.  Mild narrowing or diminished flow of the V4 segment of the right vertebral artery is not excluded also.  Basilar artery demonstrates normal flow signal.  CTA may better characterize.  Vascular consultation and follow-up recommended.  3. Posterior cerebral arteries are patent proximally bilaterally.  P1 and P2 segments are adequately demonstrated.  Probable mild narrowing in the P2 segment on the left.. Possible mild luminal narrowing and diminished flow signal in the more distal components of the PCA distribution bilaterally.    CTA Head and Neck: 1/10  1. Bilateral parietal lobe regions of acute infarction, as seen on earlier MRI.  2. CTA head and neck with no evidence of high-grade stenosis, large vessel occlusion, or dissection.  3. Approximately 50% stenosis at the right ICA origin.  4. Indeterminate small bilateral parotid enhancing " lesions.    CTA Head and Neck: 1/11  Evolving parieto-occipital infarcts again identified.     Irregular calcified and soft plaque at the carotid bifurcations however less than 50% stenosis by NASCET criteria.     At least moderate stenosis at the origin of the vertebral arteries bilaterally.     Evaluation of the [id number] demonstrates a new right M2 branch occlusion, the occlusion is somewhat distal although the branch is quite large in size.    Cardiac Evaluation:   Echo: 1/10/25    Left Ventricle: The left ventricle is normal in size. Mildly increased wall thickness. There is mild concentric hypertrophy. There is normal systolic function with a visually estimated ejection fraction of 55 - 60%. Unable to assess diastolic function due to atrial fibrillation.    Right Ventricle: Systolic function is normal.    Aortic Valve: The aortic valve is a trileaflet valve. There is moderate aortic valve sclerosis. Mildly restricted motion. There is mild stenosis. Aortic valve area by VTI is 1.9 cm². Aortic valve peak velocity is 1.5 m/s. Mean gradient is 5.3 mmHg. The dimensionless index is 0.59.    Tricuspid Valve: There is mild regurgitation.    Pulmonary Artery: The estimated pulmonary artery systolic pressure is 93 mmHg.    Pt in AFib throughout exam.

## 2025-01-11 NOTE — HPI
73 y/o M PMH HTN, HLD, Afib on Eliquis (last received on 1/11 AM), CAD s/p CABG, CKD3, insulin dependent diabetes. He had a MIS Right L4-5 laminoforaminotomy 1/6 and was off of his AC/AP during that time. At home, after the procedure, he noticed R hand numbness/weakness and did not seek treatment. He was then admitted to [facility] overnight 1/9 following a syncopal episode w/ fall and encephalopathy. At the OSH, MRI demonstrated bilateral MCA/PCA infarcts, MRA showed diminished flow in the distal BL MCA, EEG showed posterior slowing, and CTA head/neck. Patient exhibited an acute change this morning around 10AM, described as left hemiplegia and dysarthria. Stroke code called, and repeat CTA demonstrated R M2 occlusion. Patient was transferred to [facility] [place] level of care. NIHSS on arrival of 8 (left-sided inattention/neglect, dysarthria, LUE drift). Taken for DSA which showed recanalization of R M2, no intervention performed. Admitted to [facility] for post-procedure monitoring and higher level of care.

## 2025-01-11 NOTE — NURSING
Report called to [facility] ED, RN Yamilet.  Pt placed on stretcher via 2 EMS and 2 RN.

## 2025-01-11 NOTE — PLAN OF CARE
"[facility] Care Plan  POC reviewed with Driss Hernandez Jr. and family at 1400. Patient and Family verbalized understanding. Questions and concerns addressed. No acute events today. Pt progressing toward goals. Will continue to monitor. See below and flowsheets for full assessment and VS info.     -Pt c/o itchiness to BUE and BLE. Administered 1x dose of Benadryl with improvement.   -Cardene gtt for SBP<180  -CTA PE protocol 1/12 @1000          Is this a stroke patient? yes- Stroke booklet reviewed with patient and family, risk factors identified for patient and stroke booklet remains at bedside for ongoing education.     Care individualization: Cluster care, Communication     Neuro:  [place] Coma Scale  Best Eye Response: 4-->(E4) spontaneous  Best Motor Response: 6-->(M6) obeys commands  Best Verbal Response: 4-->(V4) confused  Derick Coma Scale Score: 14  Assessment Qualifiers: patient chemically sedated or paralyzed  Pupil PERRLA: yes     24 hr Temp:  [97.9 °F (36.6 °C)-98.9 °F (37.2 °C)]     CV:   Rhythm: atrial rhythm  BP goals:   SBP < 180  MAP > 65    Resp:           Plan: N/A    GI/:        Last Bowel Movement: 01/09/25  Voiding Characteristics: external catheter    Intake/Output Summary (Last 24 hours) at 1/11/2025 1746  Last data filed at 1/11/2025 1505  Gross per 24 hour   Intake 800 ml   Output 400 ml   Net 400 ml          Labs/Accuchecks:  Recent Labs   Lab 01/11/25  0431   WBC 6.38   RBC 4.88   HGB 12.7*   HCT 40.9         Recent Labs   Lab 01/11/25  0431      K 4.5   CO2 21*      BUN 21   CREATININE 1.1   ALKPHOS 29*   ALT 14   AST 19   BILITOT 1.1*      Recent Labs   Lab 01/06/25  0621 01/09/25  1113   INR 1.1 1.1   APTT 25.4  --     No results for input(s): "CPK", "CPKMB", "TROPONINI", "MB" in the last 168 hours.    Electrolytes: N/A - electrolytes WDL  Accuchecks: Q6H    Gtts:   nicardipine  0-15 mg/hr Intravenous Continuous 25 mL/hr at 01/11/25 1610 5 mg/hr at 01/11/25 1610 "       LDA/Wounds:    Garfield Risk Assessment  Sensory Perception: 3-->slightly limited  Moisture: 3-->occasionally moist  Activity: 1-->bedfast  Mobility: 3-->slightly limited  Nutrition: 2-->probably inadequate  Friction and Shear: 2-->potential problem  Garfield Score: 14    Is your garfield score 12 or less? no            Restraints:        [facility]

## 2025-01-11 NOTE — ANESTHESIA POSTPROCEDURE EVALUATION
Anesthesia Post Evaluation    Patient: Driss Hernandez Jr.    Procedure(s) Performed: * No procedures listed *    Final Anesthesia Type: general      Patient location during evaluation: ICU  Patient participation: Yes- Able to Participate  Level of consciousness: awake and alert  Post-procedure vital signs: reviewed and stable  Pain management: adequate  Airway patency: patent    PONV status at discharge: No PONV  Anesthetic complications: no      Cardiovascular status: hemodynamically stable  Hydration status: euvolemic  Follow-up not needed.              Vitals Value Taken Time   /141 01/11/25 1511   Temp  01/11/25 1513   Pulse 68 01/11/25 1512   Resp 35 01/11/25 1512   SpO2 97 % 01/11/25 1512   Vitals shown include unfiled device data.      No case tracking events are documented in the log.      Pain/Jose Maria Score: Pain Rating Prior to Med Admin: 7 (1/11/2025  9:18 AM)  Pain Rating Post Med Admin: 7 (1/11/2025 10:20 AM)

## 2025-01-11 NOTE — NURSING
RAPID RESPONSE NURSE STROKE ALERT NOTE         Admit Date: 2025  LOS: 1  Code Status: Full Code   Date of Consult: 2025  : 1950  Age: 74 y.o.  Weight:   Wt Readings from Last 1 Encounters:   25 83.1 kg (183 lb 3.2 oz)     Sex: male  Race: White   [facility]:   MRN: [id number]  Time Rapid Response Team page Received: 0957  Time Rapid Response Team at Bedside: 0959  Time Rapid Response Team left Bedside: 1100  Was the patient discharged from an ICU this admission? Yes   Was the patient discharged from a PACU within last 24 hours? No   Did the patient receive conscious sedation/general anesthesia in last 24 hours? No  Was the patient in the ED within the past 24 hours? Yes  Was the patient on NIPPV within the past 24 hours? No   Did this progress into an ARC or CPA: No  Attending Physician: Marquis Monique MD  Primary Service: [facility] NEUROCRITICAL CARE       SITUATION    Notified by overhead page.  Called to evaluate the patient for Neuro    BACKGROUND    Why is the patient in the hospital?: Encephalopathy  Patient has a past medical history of Chronic heart failure with preserved ejection fraction, Chronic midline low back pain without sciatica, Colon polyps, Coronary artery disease involving native coronary artery of native heart, Coronary artery disease involving native coronary artery of native heart with angina pectoris, CVA (cerebral vascular accident), Diabetes mellitus with neurological manifestations, uncontrolled, Diabetic polyneuropathy associated with type 2 diabetes mellitus, Diabetic polyneuropathy associated with type 2 diabetes mellitus, Encephalopathy, Essential hypertension, Gastroesophageal reflux disease, Hyperlipidemia, Insomnia, Long-term insulin use, Longstanding persistent atrial fibrillation, Lumbar spondylosis, Nuclear sclerosis of both eyes, Obesity, PAD (peripheral artery disease), Stage 3 chronic kidney disease, and Uncontrolled type 2 diabetes mellitus  "without complication, with long-term current use of insulin.    ASSESSMENT    Last VS: /64   Pulse 75   Temp 98.7 °F (37.1 °C) (Axillary)   Resp (!) 21   Ht 5' 7" (1.702 m)   Wt 83.1 kg (183 lb 3.2 oz)   SpO2 96%   BMI 28.69 kg/m²     24H Vital Sign Range:  Temp:  [97.9 °F (36.6 °C)-98.9 °F (37.2 °C)]   Pulse:  [60-81]   Resp:  [17-31]   BP: (137-244)/()   SpO2:  [96 %-100 %]     Last know well time: 0950    Glucose time: 0959   Glucose result: 81    Physical Exam    Time Stroke Code initiated:   Stroke team Arrival time:  Stroke Code activation triggers:   Vascular Neurology provider you spoke with:  NA    Time arrived at CT:  Time CT completed:    VAN positive or negative:  NA  NIH Stroke Scale Total Score:     tPA decision: No  tPA bolus (mg and time):  tPA infusion (mg and time):  tPA end time:    IR decision: Yes  IR arrival:  IR end time:     RECOMMENDATIONS    We recommend: Transfer to [facility]    FOLLOW-UP/PLAN    Call the Rapid Response Nurse, Maida Crespo RN [phone] for additional questions or concerns.    PHYSICIAN ESCALATION    Orders received and case discussed with Biju WILKINSON     Disposition: Tx in ICU bed 265.           "

## 2025-01-11 NOTE — ANESTHESIA PROCEDURE NOTES
Intubation    Date/Time: 1/11/2025 1:54 PM    Performed by: Cathy Miranda CRNA  Authorized by: Dada Tello MD    Intubation:     Induction:  Rapid sequence induction    Intubated:  Postinduction    Mask Ventilation:  Not attempted    Attempts:  1    Attempted By:  CRNA    Method of Intubation:  Video laryngoscopy    Blade:  Weinstein 3    Laryngeal View Grade: Grade I - full view of cords      Difficult Airway Encountered?: No      Complications:  None    Airway Device:  Oral endotracheal tube    Airway Device Size:  7.5    Style/Cuff Inflation:  Cuffed (inflated to minimal occlusive pressure)    Tube secured:  22    Secured at:  The lips    Placement Verified By:  Capnometry    Complicating Factors:  None    Findings Post-Intubation:  BS equal bilateral

## 2025-01-11 NOTE — ANESTHESIA PREPROCEDURE EVALUATION
01/11/2025  [facility]  Anesthesia Pre-Operative Evaluation     Patient Name: Driss Hernandez Jr.  YOB: 1950  MRN: [id number]  [facility]: [id number]       Admit Date: 1/9/2025   Admit Team: [facility] Day: 3  Date of Procedure: 1/11/2025  Anesthesia: * No anesthesia type entered * Procedure: * No procedures listed *  Pre-Operative Diagnosis: * No pre-op diagnosis entered *  Proceduralist:* No surgeons found in log *  Code Status: Full Code   Advanced Directive: <no information>  Isolation Precautions: No active isolations  Capacity: Full capacity     SUBJECTIVE:   Driss Hernandez Jr. is a 74 y.o. male who  has a past medical history of Chronic heart failure with preserved ejection fraction (2/9/2023), Chronic midline low back pain without sciatica (10/2/2017), Colon polyps, Coronary artery disease involving native coronary artery of native heart (3/5/2020), Coronary artery disease involving native coronary artery of native heart with angina pectoris (12/10/2020), CVA (cerebral vascular accident) (1/11/2025), Diabetes mellitus with neurological manifestations, uncontrolled (1/24/2017), Diabetic polyneuropathy associated with type 2 diabetes mellitus (1/24/2017), Diabetic polyneuropathy associated with type 2 diabetes mellitus, Encephalopathy (1/10/2025), Essential hypertension (1/24/2017), Gastroesophageal reflux disease (1/24/2017), Hyperlipidemia (1/24/2017), Insomnia (1/24/2017), Long-term insulin use (1/24/2017), Longstanding persistent atrial fibrillation (12/30/2020), Lumbar spondylosis (11/13/2017), Nuclear sclerosis of both eyes (8/24/2017), Obesity, PAD (peripheral artery disease) (3/23/2022), Stage 3 chronic kidney disease (2/13/2019), and Uncontrolled type 2 diabetes mellitus without complication, with long-term current use of insulin (1/24/2017).  74  yo male with medical history of L4/5 laminectomy; S/p recent MIS Right L4-5 laminectomy; chronic balance issues, chronic pain on oxycodone, DM, CAD s/p CABG, HTN, HLD, AFib on Eliquis, and CKD 3  - with admission concerns of encephalopathy. And neurology has been consulted for the same.     History from patient / family and medical records review. Patient was found slided down on the side in the bathroom - with wife reporting patient was noted to be confused / starred off / with delay in responses. No obvious evidence of clinical seizures / loss of bowel or bladder continence / tongue biting - had bruising and bleeding from the arm. Background includes recent surgery / pain medication management - however no clear report of any pain related vagal event. No report of any preceding diaphoresis / felling pale / generalized weakness. No clear emotional / situational / orthostatic trigger. No associated cardiogenic triggers. Gradual improvement in symptoms over the time and level of awareness.. No focal motor or sensory or cranial nerve deficits. No history of primary autonomic failure / cardiac arrhyhtmia pathology. No history of stroke / seizures / complex migraines. No similar event  in the past.     Investigations - mild MADDIE on admission. Afib on AC. CT head negative for any acute findings.   Exam awake alert, oriented to person / family /  / current affairs / not to month but year / no focal motor or sensory or cranial nerve deficits      Hospital LOS: 1 days  ICU LOS: 2h    he has a current medication list which includes the following long-term medication(s): albuterol, atorvastatin, blood-glucose meter, coenzyme q10, fenofibrate, furosemide, gabapentin, glimepiride, hydralazine, isosorbide mononitrate, meclizine, metformin, metformin, nitroglycerin, omega-3 acid ethyl esters, and pantoprazole.     ALLERGIES:     Review of patient's allergies indicates:   Allergen Reactions    Penicillins Other (See Comments)      Unknown reaction, Had a reaction as a child    Shrimp Itching     Hand Itching     LDA:   AIRWAY:         [unfilled]     Lines/Drains/Airways       Peripheral Intravenous Line  Duration                  Peripheral IV - Single Lumen 01/09/25 1043 20 G Left Antecubital 2 days         Peripheral IV - Single Lumen 01/11/25 1100 20 G Posterior;Right Hand <1 day                   Anesthesia Evaluation      Airway   Mallampati: II  TM distance: Normal  Neck ROM: Normal ROM  Dental      Pulmonary    Cardiovascular   (+) hypertension, CAD, angina    Neuro/Psych    (+) neuromuscular disease, CVA    GI/Hepatic/Renal    (+) GERD, chronic renal disease    Endo/Other    (+) diabetes mellitus, arthritis  Abdominal                    MEDICATIONS:     Current Outpatient Medications on File Prior to Encounter   Medication Sig Dispense Refill Last Dose/Taking    HYDROcodone-acetaminophen (NORCO)  mg per tablet Take 1 tablet by mouth every 4 (four) hours as needed for Pain (7-10/10 pain). 42 tablet 0 1/9/2025    acetaminophen (TYLENOL) 650 MG TbSR Take 650 mg by mouth every 8 (eight) hours.   Unknown    albuterol (PROVENTIL/VENTOLIN HFA) 90 mcg/actuation inhaler Inhale 2 puffs into the lungs every 4 (four) hours as needed for Shortness of Breath. Rescue 17 g 3 Unknown    apixaban (ELIQUIS) 5 mg Tab Take 1 tablet (5 mg total) by mouth 2 (two) times daily.   Unknown    ascorbic acid, vitamin C, (VITAMIN C) 100 MG tablet Take 100 mg by mouth daily as needed.   Unknown    atorvastatin (LIPITOR) 80 MG tablet TAKE 1 TABLET EVERY EVENING 90 tablet 3 Unknown    b complex vitamins tablet Take 1 tablet by mouth once daily.       blood-glucose meter kit Test glucose 2-3x/day. True metrix 1 each 0     clopidogreL (PLAVIX) 75 mg tablet Take 75 mg by mouth once daily.   Unknown    coenzyme Q10 100 mg capsule Take 1 capsule (100 mg total) by mouth once daily.   Unknown    dapagliflozin propanediol (FARXIGA) 5 mg Tab tablet Take 1 tablet (5  mg total) by mouth once daily. 90 tablet 3 Unknown    ergocalciferol (ERGOCALCIFEROL) 50,000 unit Cap TAKE 1 CAPSULE BY MOUTH WEEKLY 12 capsule 0 Unknown    fenofibrate 160 MG Tab TAKE 1 TABLET EVERY MORNING 90 tablet 1 Unknown    fluticasone propionate (FLONASE) 50 mcg/actuation nasal spray 2 sprays (100 mcg total) by Each Nostril route 2 (two) times daily. 18.2 mL 3 Unknown    furosemide (LASIX) 20 MG tablet TAKE 1 TABLET TWICE DAILY 180 tablet 3 Unknown    gabapentin (NEURONTIN) 300 MG capsule Take 2 capsules (600 mg total) by mouth 2 (two) times daily.   Unknown    glimepiride (AMARYL) 1 MG tablet TAKE 1 TABLET BEFORE BREAKFAST 90 tablet 3 Unknown    hydrALAZINE (APRESOLINE) 50 MG tablet TAKE 1 TABLET TWICE DAILY 180 tablet 3 Unknown    insulin degludec (TRESIBA FLEXTOUCH U-100) 100 unit/mL (3 mL) insulin pen Inject 20 Units into the skin once daily. 30 mL 4 Unknown    isosorbide mononitrate (IMDUR) 120 MG 24 hr tablet Take 1 tablet (120 mg total) by mouth once daily. 90 tablet 3 Unknown    lancets Misc Check blood glucose 2x/day. True metrix. E11.65 200 each 3 Unknown    linaCLOtide (LINZESS) 145 mcg Cap capsule Take 1 capsule (145 mcg total) by mouth before breakfast. 30 capsule 0 Unknown    meclizine (ANTIVERT) 25 mg tablet Take 1 tablet (25 mg total) by mouth 3 (three) times daily as needed for Dizziness (or at least one HS for 1 week when vertigo active). 60 tablet 12 Unknown    metFORMIN (GLUCOPHAGE-XR) 500 MG ER 24hr tablet Take 1 tablet with breakfast and 2 tablets with supper. 270 tablet 12 Unknown    metFORMIN (GLUCOPHAGE-XR) 500 MG ER 24hr tablet Take 1 tablet with breakfast and 2 tablets with supper. 270 tablet 2 Unknown    methocarbamoL (ROBAXIN) 750 MG Tab Take 1 tablet (750 mg total) by mouth 3 (three) times daily as needed (muscle spasms). 60 tablet 0 Unknown    nitroGLYCERIN (NITROSTAT) 0.4 MG SL tablet Place 1 tablet (0.4 mg total) under the tongue every 5 (five) minutes as needed for Chest  "pain. 25 tablet 11     omega-3 acid ethyl esters (LOVAZA) 1 gram capsule Take 2 capsules (2 g total) by mouth 2 (two) times daily.   Unknown    ondansetron (ZOFRAN-ODT) 4 MG TbDL Dissolvxe 1 tablet (4 mg total) by mouth every 6 (six) hours as needed (nausea). 15 tablet 0 Unknown    pantoprazole (PROTONIX) 40 MG tablet TAKE 1 TABLET EVERY DAY 90 tablet 3 Unknown    pen needle, diabetic 32 gauge x 1/4" Ndle Use daily 200 each 3 Unknown    semaglutide (OZEMPIC) 2 mg/dose (8 mg/3 mL) PnIj Inject 2 mg into the skin every 7 days. 4 each 3 Unknown    triazolam (HALCION) 0.25 MG Tab TAKE 1 TABLET BY MOUTH EVERY NIGHT AT BEDTIME AS NEEDED 90 tablet 5 Unknown      Inpatient Medications:  Antibiotics (From admission, onward)      Start     Stop Route Frequency Ordered    01/11/25 1145  mupirocin 2 % ointment         01/16/25 0859 Nasl 2 times daily 01/11/25 1042          VTE Risk Mitigation (From admission, onward)           Ordered     apixaban tablet 5 mg  2 times daily         01/10/25 2224     Reason for No Pharmacological VTE Prophylaxis  Once        Question:  Reasons:  Answer:  Risk of Bleeding    01/09/25 1911     IP VTE HIGH RISK PATIENT  Once         01/09/25 1911     Place sequential compression device  Until discontinued         01/09/25 1911                   apixaban  5 mg Oral BID    atorvastatin  80 mg Oral QHS    [START ON 1/12/2025] clopidogreL  75 mg Oral Daily    fenofibrate  160 mg Oral QAM    fluticasone propionate  2 spray Each Nostril BID    mupirocin   Nasal BID    pantoprazole  40 mg Oral Daily    polyethylene glycol  17 g Oral Daily       Current Facility-Administered Medications   Medication Dose Route Frequency Provider Last Rate Last Admin    acetaminophen tablet 650 mg  650 mg Oral Q4H PRN Agustin Brooks MD        albuterol sulfate nebulizer solution 2.5 mg  2.5 mg Nebulization Q4H PRN Keyur Mello MD        aluminum-magnesium hydroxide-simethicone 200-200-20 mg/5 mL suspension 30 mL  30 " mL Oral QID PRN Agustin Brooks MD        apixaban tablet 5 mg  5 mg Oral BID Agustin Brooks MD   5 mg at 01/11/25 0813    atorvastatin tablet 80 mg  80 mg Oral QHS Agustin Brooks MD   80 mg at 01/10/25 2106    [START ON 1/12/2025] clopidogreL tablet 75 mg  75 mg Oral Daily Biju Talavera PA-C        diphenhydrAMINE capsule 25 mg  25 mg Oral Q6H PRN Agustin Brooks MD   25 mg at 01/11/25 0913    fenofibrate tablet 160 mg  160 mg Oral QAM Agustin Brooks MD   160 mg at 01/11/25 0814    fluticasone propionate 50 mcg/actuation nasal spray 100 mcg  2 spray Each Nostril BID Agustin Brooks MD   100 mcg at 01/11/25 0815    glucagon (human recombinant) injection 1 mg  1 mg Intramuscular PRN Agustin Brooks MD        glucose chewable tablet 16 g  16 g Oral PRN Agustin Brooks MD        glucose chewable tablet 24 g  24 g Oral PRN Agustin Brooks MD        hydrALAZINE injection 5 mg  5 mg Intravenous Q6H PRN Biju Talavera PA-C        HYDROcodone-acetaminophen  mg per tablet 1 tablet  1 tablet Oral Q4H PRN Agustin Brooks MD   1 tablet at 01/11/25 0918    insulin aspart U-100 pen 0-5 Units  0-5 Units Subcutaneous Q4H PRAgustin Dunn MD        meclizine tablet 25 mg  25 mg Oral TID PRAgustin Dunn MD        melatonin tablet 6 mg  6 mg Oral Nightly PRN Agustin Brooks MD   6 mg at 01/10/25 2240    mupirocin 2 % ointment   Nasal BID Keyur Mello MD        naloxone 0.4 mg/mL injection 0.02 mg  0.02 mg Intravenous PRN Agustin Brooks MD        nitroGLYCERIN SL tablet 0.4 mg  0.4 mg Sublingual Q5 Min PRN Agustin Brooks MD        OLANZapine 5 mg/mL injection 2.5 mg  2.5 mg Intravenous Once PRN Agustin Brooks MD        ondansetron injection 4 mg  4 mg Intravenous Q6H PRN Agustin Brooks MD        pantoprazole EC tablet 40 mg  40 mg Oral Daily Agustin Brooks MD   40 mg at 01/11/25 0813    polyethylene glycol packet 17 g  17 g Oral Daily Agustin Brooks MD   17 g at  01/10/25 0850    prochlorperazine injection Soln 5 mg  5 mg Intravenous Q6H PRN Agustin Brooks MD        sodium chloride 0.9% flush 10 mL  10 mL Intravenous Q12H PRN Agustin Brooks MD        sodium chloride 0.9% flush 10 mL  10 mL Intravenous PRN Biju Talavera PA-C         Current Outpatient Medications   Medication Sig Dispense Refill    HYDROcodone-acetaminophen (NORCO)  mg per tablet Take 1 tablet by mouth every 4 (four) hours as needed for Pain (7-10/10 pain). 42 tablet 0    acetaminophen (TYLENOL) 650 MG TbSR Take 650 mg by mouth every 8 (eight) hours.      albuterol (PROVENTIL/VENTOLIN HFA) 90 mcg/actuation inhaler Inhale 2 puffs into the lungs every 4 (four) hours as needed for Shortness of Breath. Rescue 17 g 3    apixaban (ELIQUIS) 5 mg Tab Take 1 tablet (5 mg total) by mouth 2 (two) times daily.      ascorbic acid, vitamin C, (VITAMIN C) 100 MG tablet Take 100 mg by mouth daily as needed.      atorvastatin (LIPITOR) 80 MG tablet TAKE 1 TABLET EVERY EVENING 90 tablet 3    b complex vitamins tablet Take 1 tablet by mouth once daily.      blood-glucose meter kit Test glucose 2-3x/day. True metrix 1 each 0    clopidogreL (PLAVIX) 75 mg tablet Take 75 mg by mouth once daily.      coenzyme Q10 100 mg capsule Take 1 capsule (100 mg total) by mouth once daily.      dapagliflozin propanediol (FARXIGA) 5 mg Tab tablet Take 1 tablet (5 mg total) by mouth once daily. 90 tablet 3    ergocalciferol (ERGOCALCIFEROL) 50,000 unit Cap TAKE 1 CAPSULE BY MOUTH WEEKLY 12 capsule 0    fenofibrate 160 MG Tab TAKE 1 TABLET EVERY MORNING 90 tablet 1    fluticasone propionate (FLONASE) 50 mcg/actuation nasal spray 2 sprays (100 mcg total) by Each Nostril route 2 (two) times daily. 18.2 mL 3    furosemide (LASIX) 20 MG tablet TAKE 1 TABLET TWICE DAILY 180 tablet 3    gabapentin (NEURONTIN) 300 MG capsule Take 2 capsules (600 mg total) by mouth 2 (two) times daily.      glimepiride (AMARYL) 1 MG tablet TAKE 1 TABLET  "BEFORE BREAKFAST 90 tablet 3    hydrALAZINE (APRESOLINE) 50 MG tablet TAKE 1 TABLET TWICE DAILY 180 tablet 3    insulin degludec (TRESIBA FLEXTOUCH U-100) 100 unit/mL (3 mL) insulin pen Inject 20 Units into the skin once daily. 30 mL 4    isosorbide mononitrate (IMDUR) 120 MG 24 hr tablet Take 1 tablet (120 mg total) by mouth once daily. 90 tablet 3    lancets Misc Check blood glucose 2x/day. True metrix. E11.65 200 each 3    linaCLOtide (LINZESS) 145 mcg Cap capsule Take 1 capsule (145 mcg total) by mouth before breakfast. 30 capsule 0    meclizine (ANTIVERT) 25 mg tablet Take 1 tablet (25 mg total) by mouth 3 (three) times daily as needed for Dizziness (or at least one HS for 1 week when vertigo active). 60 tablet 12    metFORMIN (GLUCOPHAGE-XR) 500 MG ER 24hr tablet Take 1 tablet with breakfast and 2 tablets with supper. 270 tablet 12    metFORMIN (GLUCOPHAGE-XR) 500 MG ER 24hr tablet Take 1 tablet with breakfast and 2 tablets with supper. 270 tablet 2    methocarbamoL (ROBAXIN) 750 MG Tab Take 1 tablet (750 mg total) by mouth 3 (three) times daily as needed (muscle spasms). 60 tablet 0    nitroGLYCERIN (NITROSTAT) 0.4 MG SL tablet Place 1 tablet (0.4 mg total) under the tongue every 5 (five) minutes as needed for Chest pain. 25 tablet 11    omega-3 acid ethyl esters (LOVAZA) 1 gram capsule Take 2 capsules (2 g total) by mouth 2 (two) times daily.      ondansetron (ZOFRAN-ODT) 4 MG TbDL Dissolvxe 1 tablet (4 mg total) by mouth every 6 (six) hours as needed (nausea). 15 tablet 0    pantoprazole (PROTONIX) 40 MG tablet TAKE 1 TABLET EVERY DAY 90 tablet 3    pen needle, diabetic 32 gauge x 1/4" Ndle Use daily 200 each 3    semaglutide (OZEMPIC) 2 mg/dose (8 mg/3 mL) PnIj Inject 2 mg into the skin every 7 days. 4 each 3    triazolam (HALCION) 0.25 MG Tab TAKE 1 TABLET BY MOUTH EVERY NIGHT AT BEDTIME AS NEEDED 90 tablet 5     Facility-Administered Medications Ordered in Other Encounters   Medication Dose Route " Frequency Provider Last Rate Last Admin    cyclopentolate 1% ophthalmic solution 1 drop  1 drop Left Eye On Call Procedure Nickolas Hong MD   1 drop at 02/24/22 0901    ofloxacin 0.3 % ophthalmic solution 1 drop  1 drop Left Eye On Call Procedure Nickolas Hong MD   2 drop at 02/24/22 1017    sodium chloride 0.9% flush 10 mL  10 mL Intravenous PRN Nickolas Hong MD        triamcinolone acetonide injection 40 mg  40 mg Intra-articular  Valerie Olivera MD   40 mg at 02/09/23 1030          History:     Active Hospital Problems    Diagnosis  POA    *Encephalopathy [G93.40]  Unknown    CVA (cerebral vascular accident) [I63.9]  Unknown    Fall at home [W19.XXXA, Y92.009]  Not Applicable    Bradycardia [R00.1]  Yes    Syncope and collapse [R55]  Unknown    Impaired mobility and ADLs [Z74.09, Z78.9]  Yes    Longstanding persistent atrial fibrillation [I48.11]  Yes    Hx of CABG [Z95.1]  Not Applicable     3 vessel WJ 2016      Posture imbalance [R29.3]  Yes    Type 2 diabetes mellitus with peripheral neuropathy [E11.42]  Yes    Insomnia [G47.00]  Yes    Gastroesophageal reflux disease [K21.9]  Yes    Mixed hyperlipidemia [E78.2]  Yes    Essential hypertension [I10]  Yes      Resolved Hospital Problems   No resolved problems to display.     Surgical History:    has a past surgical history that includes Colonoscopy (N/A, 2/21/2017); Coronary artery bypass graft; Back surgery; Tonsillectomy; Epidural steroid injection (Bilateral, 11/14/2018); Epidural steroid injection (Bilateral, 11/28/2018); Epidural steroid injection (Bilateral, 3/20/2019); Coronary angiography including bypass grafts with catheterization of left heart (N/A, 11/11/2020); Cataract extraction w/  intraocular lens implant (Right, 08/29/2017); Cataract extraction w/  intraocular lens implant (Left, 02/24/2022); Phacoemulsification of cataract (Left, 2/24/2022); Intraocular prosthesis insertion (Left, 2/24/2022); Arthroscopic  repair of rotator cuff of shoulder (Right, 7/5/2022); Colonoscopy (N/A, 7/5/2023); Epidural steroid injection (Right, 8/2/2023); Epidural steroid injection (Right, 10/11/2023); Esophagogastroduodenoscopy (N/A, 12/6/2023); Caudal epidural steroid injection (N/A, 9/25/2024); and Laminoforaminotomy of spine using minimally invasive technique (Right, 1/6/2025).   Social History:    reports being sexually active and has had partner(s) who are female.  reports that he has never smoked. He has never been exposed to tobacco smoke. He has never used smokeless tobacco. He reports that he does not currently use alcohol. He reports that he does not use drugs.    Vitals:    01/11/25 1145 01/11/25 1200 01/11/25 1215 01/11/25 1230   BP: (!) 207/84 (!) 206/86  (!) 227/94   BP Location:       Patient Position:       Pulse: 64 62 66 65   Resp: 17 18 19 (!) 24   Temp:       TempSrc:       SpO2: 100% 100% 100% 100%   Weight:       Height:         Vital Signs Range (Last 24H):  Temp:  [36.6 °C (97.9 °F)-37.2 °C (98.9 °F)]   Pulse:  [60-79]   Resp:  [17-31]   BP: (159-244)/()   SpO2:  [97 %-100 %]     Body mass index is 27.55 kg/m².  Wt Readings from Last 4 Encounters:   01/10/25 79.8 kg (175 lb 14.8 oz)   01/02/25 79 kg (174 lb 3.2 oz)   12/30/24 79.3 kg (174 lb 13.2 oz)   12/18/24 78.5 kg (173 lb 1 oz)        Intake/Output - Last 3 Shifts         01/09 0700  01/10 0659 01/10 0700 01/11 0659 01/11 0700 01/12 0659    IV Piggyback 2000      Total Intake(mL/kg) 2000 (25.2)      Urine (mL/kg/hr) 200 250 (0.1)     Total Output 200 250     Net +1800 -250                  Lab Results   Component Value Date    WBC 6.38 01/11/2025    HGB 12.7 (L) 01/11/2025    HCT 40.9 01/11/2025     01/11/2025     01/11/2025    K 4.5 01/11/2025     01/11/2025    CREATININE 1.1 01/11/2025    BUN 21 01/11/2025    CO2 21 (L) 01/11/2025    GLU 72 01/11/2025    CALCIUM 9.4 01/11/2025    MG 1.6 01/11/2025    PHOS 3.8 01/11/2025    ALKPHOS  29 (L) 01/11/2025    ALT 14 01/11/2025    AST 19 01/11/2025    ALBUMIN 3.1 (L) 01/11/2025    INR 1.1 01/09/2025    APTT 25.4 01/06/2025    HGBA1C 6.3 (H) 12/30/2024    TROPONINI 0.054 (H) 06/24/2023     (H) 06/24/2023     Recent Results (from the past 12 hours)   Comprehensive Metabolic Panel (CMP)    Collection Time: 01/11/25  4:31 AM   Result Value Ref Range    Sodium 137 136 - 145 mmol/L    Potassium 4.5 3.5 - 5.1 mmol/L    Chloride 107 95 - 110 mmol/L    CO2 21 (L) 23 - 29 mmol/L    Glucose 72 70 - 110 mg/dL    BUN 21 8 - 23 mg/dL    Creatinine 1.1 0.5 - 1.4 mg/dL    Calcium 9.4 8.7 - 10.5 mg/dL    Total Protein 6.4 6.0 - 8.4 g/dL    Albumin 3.1 (L) 3.5 - 5.2 g/dL    Total Bilirubin 1.1 (H) 0.1 - 1.0 mg/dL    Alkaline Phosphatase 29 (L) 40 - 150 U/L    AST 19 10 - 40 U/L    ALT 14 10 - 44 U/L    eGFR >60 >60 mL/min/1.73 m^2    Anion Gap 9 8 - 16 mmol/L   Magnesium    Collection Time: 01/11/25  4:31 AM   Result Value Ref Range    Magnesium 1.6 1.6 - 2.6 mg/dL   Phosphorus    Collection Time: 01/11/25  4:31 AM   Result Value Ref Range    Phosphorus 3.8 2.7 - 4.5 mg/dL   CBC with Automated Differential    Collection Time: 01/11/25  4:31 AM   Result Value Ref Range    WBC 6.38 3.90 - 12.70 K/uL    RBC 4.88 4.60 - 6.20 M/uL    Hemoglobin 12.7 (L) 14.0 - 18.0 g/dL    Hematocrit 40.9 40.0 - 54.0 %    MCV 84 82 - 98 fL    MCH 26.0 (L) 27.0 - 31.0 pg    MCHC 31.1 (L) 32.0 - 36.0 g/dL    RDW 14.6 (H) 11.5 - 14.5 %    Platelets 215 150 - 450 K/uL    MPV 9.6 9.2 - 12.9 fL    Immature Granulocytes 0.3 0.0 - 0.5 %    Gran # (ANC) 4.2 1.8 - 7.7 K/uL    Immature Grans (Abs) 0.02 0.00 - 0.04 K/uL    Lymph # 1.1 1.0 - 4.8 K/uL    Mono # 0.6 0.3 - 1.0 K/uL    Eos # 0.4 0.0 - 0.5 K/uL    Baso # 0.02 0.00 - 0.20 K/uL    nRBC 0 0 /100 WBC    Gran % 66.4 38.0 - 73.0 %    Lymph % 17.9 (L) 18.0 - 48.0 %    Mono % 9.1 4.0 - 15.0 %    Eosinophil % 6.0 0.0 - 8.0 %    Basophil % 0.3 0.0 - 1.9 %    Differential Method Automated     POCT glucose    Collection Time: 01/11/25  7:25 AM   Result Value Ref Range    POCT Glucose 83 70 - 110 mg/dL   POCT glucose    Collection Time: 01/11/25  9:58 AM   Result Value Ref Range    POCT Glucose 81 70 - 110 mg/dL   ISTAT PROCEDURE    Collection Time: 01/11/25 11:18 AM   Result Value Ref Range    POC PH 7.370 7.35 - 7.45    POC PCO2 34.7 (L) 35 - 45 mmHg    POC PO2 86 80 - 100 mmHg    POC HCO3 20.0 (L) 24 - 28 mmol/L    POC BE -4 (L) -2 to 2 mmol/L    POC SATURATED O2 96 95 - 100 %    POC TCO2 21 (L) 23 - 27 mmol/L    Sample ARTERIAL     Site RR     Allens Test Pass     DelSys Room Air     Mode SPONT      Recent Labs   Lab 01/06/25  0621 01/09/25  1113 01/10/25  0328 01/11/25  0431   WBC  --  8.80 7.76 6.38   HGB  --  12.7* 13.4* 12.7*   HCT  --  41.3 42.8 40.9   PLT  --  207 210 215   NA  --  141 140 137   K  --  4.6 4.5 4.5   CREATININE  --  1.5* 1.1 1.1   GLU  --  76 76 72   INR 1.1 1.1  --   --      No LMP for male patient.    EKG:   Results for orders placed or performed in visit on 12/18/24   IN OFFICE EKG 12-LEAD (to [place])    Collection Time: 12/18/24 10:34 AM   Result Value Ref Range    QRS Duration 96 ms    [facility] QTC Calculation 381 ms    Narrative    Test Reason : Z01.810,    Vent. Rate :  44 BPM     Atrial Rate :  79 BPM     P-R Int :    ms          QRS Dur :  96 ms      QT Int : 446 ms       P-R-T Axes :    120 -56 degrees    QTcB Int : 381 ms    Atrial fibrillation with slow ventricular response  Right axis deviation  Anterior infarct ,age undetermined  ST and T wave abnormality, consider inferior ischemia  Abnormal ECG  No previous ECGs available  Confirmed by Leatha Dimas ([id number]) on 12/18/2024 5:47:51 PM    Referred By: HEBERT           Confirmed By: Leatha Dimas     TTE:  Results for orders placed or performed during the hospital encounter of 01/09/25   Echo   Result Value Ref Range    BSA 1.94 m2    LVOT stroke volume 59.3 cm3    LVIDd 4.2 3.5 - 6.0 cm    LV Systolic  Volume 24.66 mL    LV Systolic Volume Index 12.9 mL/m2    LVIDs 2.6 2.1 - 4.0 cm    LV Diastolic Volume 78.56 mL    LV Diastolic Volume Index 41.13 mL/m2    Left Ventricular End Systolic Volume by Teichholz Method 24.66 mL    Left Ventricular End Diastolic Volume by Teichholz Method 78.56 mL    IVS 1.3 (A) 0.6 - 1.1 cm    LVOT diameter 2.0 cm    LVOT area 3.1 cm2    FS 38.1 28 - 44 %    Left Ventricle Relative Wall Thickness 0.62 cm    PW 1.3 (A) 0.6 - 1.1 cm    LV mass 200.6 g    LV Mass Index 105.0 g/m2    MV Peak E Kartik 1.24 m/s    TR Max Kartik 4.43 m/s    IVRT 100.86 msec    LVOT peak kartik 0.9 m/s    Left Ventricular Outflow Tract Mean Velocity 0.61 cm/s    Left Ventricular Outflow Tract Mean Gradient 1.65 mmHg    RV- chambers basal diam 4.2 cm    RV S' 8.53 cm/s    TAPSE 1.12 cm    RV/LV Ratio 1.00 cm    LA size 4.85 cm    Left Atrium Minor Axis 5.84 cm    Left Atrium Major Axis 6.15 cm    RA Major Axis 5.73 cm    AV mean gradient 5.3 mmHg    AV peak gradient 9.0 mmHg    Ao peak kartik 1.5 m/s    Ao VTI 31.9 cm    LVOT peak VTI 18.9 cm    AV valve area 1.9 cm²    AV Velocity Ratio 0.60     AV index (prosthetic) 0.59     JADE by Velocity Ratio 1.9 cm²    Triscuspid Valve Regurgitation Peak Gradient 78 mmHg    PV PEAK VELOCITY 1.17 m/s    PV peak gradient 5 mmHg    Ao root annulus 3.47 cm    Sinus 3.18 cm    STJ 2.00 cm    IVC diameter 2.40 cm    ZLVIDS -1.91     ZLVIDD -2.47     RVDD 4.23 cm    AORTIC VALVE CUSP SEPERATION 1.85 cm    BERNY 53.0 mL/m2    LA Vol 101.26 cm3    LA WIDTH 4.1 cm    RA Width 4.5 cm    TV resting pulmonary artery pressure 93 mmHg    RV TB RVSP 19 mmHg    Est. RA pres 15 mmHg    Narrative      Left Ventricle: The left ventricle is normal in size. Mildly increased   wall thickness. There is mild concentric hypertrophy. There is normal   systolic function with a visually estimated ejection fraction of 55 - 60%.   Unable to assess diastolic function due to atrial fibrillation.    Right Ventricle:  Systolic function is normal.    Aortic Valve: The aortic valve is a trileaflet valve. There is moderate   aortic valve sclerosis. Mildly restricted motion. There is mild stenosis.   Aortic valve area by VTI is 1.9 cm². Aortic valve peak velocity is 1.5   m/s. Mean gradient is 5.3 mmHg. The dimensionless index is 0.59.    Tricuspid Valve: There is mild regurgitation.    Pulmonary Artery: The estimated pulmonary artery systolic pressure is   93 mmHg.    Pt in AFib throughout exam.       No results found. However, due to the size of the patient record, not all encounters were searched. Please check Results Review for a complete set of results.  MOMO:  No results found. However, due to the size of the patient record, not all encounters were searched. Please check Results Review for a complete set of results.  Stress Test:  Results for orders placed during the hospital encounter of 03/13/23    Nuclear Stress - Cardiology Interpreted    Interpretation Summary    The patient exercised for 8 minutes 16 seconds on a Marcial protocol, corresponding to a functional capacity of 9 METS, achieving a peak heart rate of 127 bpm, which is 86 % of the age predicted maximum heart rate. Their exercise capacity was above average.    Normal myocardial perfusion scan. There is no evidence of myocardial ischemia or infarction.    There is a mild to moderate intensity perfusion abnormality in the anteroseptal wall of the left ventricle consistent with the RV insertion site.    The gated perfusion images showed an ejection fraction of 66% post stress.    There is normal wall motion post stress.    The ECG portion of the study is negative for ischemia.    The patient reported no chest pain during the stress test.    During stress, rare PVCs are noted.    The exercise capacity was above average.     LHC:  Results for orders placed during the hospital encounter of 12/10/20    Cardiac catheterization    Conclusion  · The 2nd Mrg lesion was 90%  "stenosed with 0% stenosis post-intervention.  · A STENT RESOLUTE JANENE [id number] stent was successfully placed at 12 VILLA for 22 sec.  · The estimated blood loss was none.    1.  90% mid OM2 stenosis.  2.  Successful PCI with placement of a 2.25 x 26 mm drug-eluting stent reducing the 90% stenosis to 0%.  INGRIS 3 flow.  No dissection.  Good angiographic results.    Procedure Log documented by Documenter: Kris Connolly RN; Gay Jacobo and verified by Bob Nogueira MD.    Date: 12/10/2020  Time: 8:54 AM     PFT:  No results found for: "FEV1", "FVC", "DUY3JPJ", "TLC", "DLCO"           Pre-op Assessment    I have reviewed the Patient Summary Reports.     I have reviewed the Nursing Notes. I have reviewed the NPO Status.   I have reviewed the Medications.     Review of Systems  Anesthesia Hx:               Denies Personal Hx of Anesthesia complications.                    Cardiovascular:     Hypertension   CAD      Angina                                    Renal/:  Chronic Renal Disease                Hepatic/GI:     GERD                Musculoskeletal:  Arthritis               Neurological:   CVA Neuromuscular Disease,                                   Endocrine:  Diabetes               Physical Exam  General: Alert, Cooperative and Oriented    Airway:  Mallampati: II   Mouth Opening: Normal  TM Distance: Normal  Tongue: Normal  Neck ROM: Normal ROM        Anesthesia Plan  Type of Anesthesia, risks & benefits discussed:    Anesthesia Type: Gen ETT  Intra-op Monitoring Plan: Standard ASA Monitors  Post Op Pain Control Plan: multimodal analgesia  Induction:  IV  Airway Plan: Direct  Informed Consent: Informed consent signed with the Patient and all parties understand the risks and agree with anesthesia plan.  All questions answered.   ASA Score: 4 Emergent  Day of Surgery Review of History & Physical: H&P Update referred to the surgeon/provider.    Ready For Surgery From Anesthesia Perspective.     .      "

## 2025-01-11 NOTE — TELEMEDICINE CONSULT
[facility]  Vascular Neurology  Comprehensive Stroke Center  TeleVascular Neurology Interprofessional Consult Note           Consult Information  Consults    Consulting Provider: LUIS MIGUEL JAQUEZ   Patient Location:  [facility] TELEMETRY     Summary of patient's symptoms:  Patient admitted with bihemispheric strokes He had been off apixaban (AF) for surgery but it has been restarted. Developed L neglect/aphasia this AM.  CT shows no hemorrhage.     Images personally reviewed and interpreted:  Study: Head CT; MRI  Study Interpretation: mild ischemic B parietal lobes; no hemorrhage     Assessment and plan:  Recurrent stroke vs seizure:  Not an interventional candidate.  Rec contacting local neurology (I believe [facility]) for further recs.     I spent approximately 15 minutes on this encounter. More than half of that time was spent communicating with the consulting provider and coordinating patient care.       Bob Lauren MD        This encounter was conducted as an interprofessional communication between providers at the [facility] and vascular neurologist. The interaction was completed over the phone or via secure messaging (electronic medical record - [facility] Secure Chat).     Once this note was completed, a written copy was sent back to the provider via fax or electronic medical record.

## 2025-01-11 NOTE — PROCEDURES
Interventional Neuroradiology Post-Thrombectomy Note    Pre Op Diagnosis: Occlusion of right M2     Post Op Diagnosis: Refer to the full angio report     Procedure: Cerebral angiogram and mechanical thrombectomy    Procedure performed by: Arjun Franco MD; Star Viramontes MD    Written Informed Consent Obtained: Yes. From family over the phone    Specimen Removed: NO    Estimated Blood Loss: Minimal    Procedure report:     A 8F sheath was placed into the right femoral artery and an 088 Neuron Max was advanced over a 5F Keven catheter through the aortic arch and into the affected vessel.  The right common and internal carotid arteries were subselected and angiography of the brain was performed after contrast injection into the vessel.    Preliminary interpretation: Spontaneous recanalization. Previously occluded right M2 branch was open during angiogram so no attempts of thrombectomy were made. There is area of hypoperfusion in the left distal parietal area, likely reflecting his current strokes (same area as seen as DWI on MRI). . Please see Imaging report for full details.     A right femoral artery angiogram was performed, the sheath removed and hemostasis achieved using perclose.  No hematoma was present at the time of hemostasis.     The patient tolerated the procedure well.      Pre-FERMÍN TICI grade: 2C  Post-FERMÍN TICI grade: 2C  Number of passes: 0    The patient tolerated the procedure well.      Plan:  -To NCC for monitoring  -Goal SBP<160  -Bed rest for 2h  -Groin check and pulse check q2h   -Remainder of care, per vascular neurology and NCC                    Star Viramontes MD, MHA  Fellow, NeuroEndovascular Surgery, [employer] Ghassan Romano  Neurologist, [facility], [place]

## 2025-01-11 NOTE — PROGRESS NOTES
Patient arrived to [facility] from [facility]-->[facility] IR-->[facility]      Report received from:  IR nurse    Type of stroke/diagnosis:  encephalopathy    Thrombectomy hemostasis: 1420    Current symptoms: follows commands, L weakness, slurred speech    Skin Assessment done: Yes  Wounds noted: R groin site   *If wounds noted, was Wound Care consulted? N/a  *If wounds noted, LDA placed? In place  Skin Assessment Verified by:  VEENA Smart Completed? pending    Patient Belongings on Admit: none    NCC notified: MERLIN Forde

## 2025-01-11 NOTE — PLAN OF CARE
Procedure completed. Patient tolerated well; vitals per anesthesia. Hemostasis achieved to right groin via Perclose at 1420; patient to remain flat until 1620. Site CDI without hematoma. Patient to be transported to [place] [id number] accompanied by this RN and CRNA; report to be given at the bedside.

## 2025-01-11 NOTE — SUBJECTIVE & OBJECTIVE
Interval History: reports intermittent back pain, but well controlled.   Review of Systems   Constitutional:  Negative for chills and fever.   Eyes:  Negative for photophobia and visual disturbance.   Respiratory:  Negative for chest tightness and shortness of breath.    Cardiovascular:  Negative for chest pain and palpitations.   Gastrointestinal:  Negative for abdominal pain, nausea and vomiting.   Genitourinary:  Negative for dysuria and hematuria.     Objective:     Vital Signs (Most Recent):  Temp: 97.9 °F (36.6 °C) (01/11/25 0727)  Pulse: 60 (01/11/25 0727)  Resp: 19 (01/11/25 0727)  BP: (!) 189/85 (01/11/25 0727)  SpO2: 98 % (01/11/25 0727) Vital Signs (24h Range):  Temp:  [97.9 °F (36.6 °C)-98.4 °F (36.9 °C)] 97.9 °F (36.6 °C)  Pulse:  [60-79] 60  Resp:  [18-26] 19  SpO2:  [97 %-100 %] 98 %  BP: (159-221)/(72-98) 189/85     Weight: 79.8 kg (175 lb 14.8 oz)  Body mass index is 27.55 kg/m².    Intake/Output Summary (Last 24 hours) at 1/11/2025 0854  Last data filed at 1/11/2025 0005  Gross per 24 hour   Intake --   Output 250 ml   Net -250 ml         Physical Exam  Vitals and nursing note reviewed.   Constitutional:       General: He is not in acute distress.     Appearance: He is well-developed. He is not diaphoretic.   HENT:      Head: Normocephalic and atraumatic.      Right Ear: External ear normal.      Left Ear: External ear normal.   Eyes:      General:         Right eye: No discharge.         Left eye: No discharge.      Conjunctiva/sclera: Conjunctivae normal.   Neck:      Thyroid: No thyromegaly.   Cardiovascular:      Rate and Rhythm: Normal rate and regular rhythm.      Heart sounds: No murmur heard.  Pulmonary:      Effort: Pulmonary effort is normal. No respiratory distress.      Breath sounds: Normal breath sounds.   Abdominal:      General: Bowel sounds are normal. There is no distension.      Palpations: Abdomen is soft. There is no mass.      Tenderness: There is no abdominal tenderness.    Musculoskeletal:         General: No deformity.      Cervical back: Normal range of motion and neck supple.      Right lower leg: No edema.      Left lower leg: No edema.   Skin:     General: Skin is warm and dry.   Neurological:      Mental Status: He is alert.      Sensory: No sensory deficit.      Comments: Oriented to self, place, but no year. Reports new years just past.    Psychiatric:         Mood and Affect: Mood normal.         Behavior: Behavior normal.             Significant Labs: CBC:   Recent Labs   Lab 01/09/25  1113 01/10/25  0328 01/11/25  0431   WBC 8.80 7.76 6.38   HGB 12.7* 13.4* 12.7*   HCT 41.3 42.8 40.9    210 215     CMP:   Recent Labs   Lab 01/09/25  1113 01/10/25  0328 01/11/25  0431    140 137   K 4.6 4.5 4.5    111* 107   CO2 25 20* 21*   GLU 76 76 72   BUN 28* 24* 21   CREATININE 1.5* 1.1 1.1   CALCIUM 9.0 9.4 9.4   PROT 6.8 6.4 6.4   ALBUMIN 3.4* 3.3* 3.1*   BILITOT 0.7 0.8 1.1*   ALKPHOS 27* 26* 29*   AST 19 16 19   ALT 14 14 14   ANIONGAP 8 9 9     TSH:   Recent Labs   Lab 01/10/25  0328   TSH 8.716*     Urine Studies:   Recent Labs   Lab 01/09/25  1619   COLORU Yellow   APPEARANCEUA Clear   PHUR >8.0*   SPECGRAV 1.015   PROTEINUA 1+*   GLUCUA 2+*   KETONESU Negative   BILIRUBINUA Negative   OCCULTUA Negative   NITRITE Negative   UROBILINOGEN 1.0   LEUKOCYTESUR Negative   RBCUA 5*   WBCUA 4   BACTERIA Occasional   HYALINECASTS 2*       Significant Imaging:   Imaging Results              CTA Head and Neck (xpd) (Final result)  Result time 01/10/25 19:34:15      Final result by Karen Piedra MD (01/10/25 19:34:15)                   Impression:      1. Bilateral parietal lobe regions of acute infarction, as seen on earlier MRI.  2. CTA head and neck with no evidence of high-grade stenosis, large vessel occlusion, or dissection.  3. Approximately 50% stenosis at the right ICA origin.  4. Indeterminate small bilateral parotid enhancing lesions.      Electronically  signed by: Karen Piedra MD  Date:    01/10/2025  Time:    19:34               Narrative:    EXAMINATION:  CTA HEAD AND NECK (XPD)    CLINICAL HISTORY:  Stroke/TIA, determine embolic source;    TECHNIQUE:  Non contrast low dose axial images were obtained through the head.  CT angiogram was performed from the level of the guerrero to the top of the head following the IV administration of 100mL of Omnipaque 350.   Sagittal and coronal reconstructions and maximum intensity projection reconstructions were performed. Arterial stenosis percentages are based on NASCET measurement criteria.  Rapid AI screening was performed.    COMPARISON:  MRI/MRA brain and neck from the same date.    FINDINGS:  Abnormal regions of parenchymal hypoattenuation are seen involving the bilateral parietal lobes consistent with acute infarction as evidenced on earlier MRI.  No evidence of intracranial or intraparenchymal hemorrhage.  The ventricular system is normal in size and configuration with no evidence of hydrocephalus. No effacement of the skull-base cisterns.  Visualized paranasal sinuses and mastoid air cells are clear. The calvarium shows no significant abnormality.    CTA Neck: Atherosclerotic plaquing of the arch and origin of the great vessels without significant focal stenosis.  The origins of the right brachiocephalic, left common carotid and left subclavian arteries from the arch are within normal limits.  The origins of the vertebral arteries are within normal limits.  The vertebral arteries are patent without evidence for focal stenosis or occlusion.   The bilateral common carotid arteries and internal carotid arteries are patent without evidence for high-grade focal stenosis or occlusion.  Plaquing is seen at the bilateral carotid bifurcations with approximately 50% stenosis seen at the right ICA origin.  No evidence of carotid or vertebral artery dissection.    Anterior circulation: The bilateral distal cervical, petrous,  cavernous, and supraclinoid ICAs are patent without significant stenosis or aneurysm.  Significant atherosclerotic plaquing of the cavernous segments of the ICAs.  The anterior and middle cerebral arteries are patent without significant stenosis, occlusion, or aneurysm.    Posterior circulation: Distal vertebral arteries, basilar artery and posterior cerebral arteries are patent without significant focal stenosis, occlusion, or aneurysm.    Airways: Unremarkable.    Glands/Nodes: The submandibular glands are unremarkable.  Small bilateral parotid gland enhancing lesions are seen, the largest of which measures 1.5 cm on the left.  There is a 1.1 cm posterior right thyroid lobe hypodense nodule.    Spine: Multilevel degenerative changes are seen throughout the cervical spine and upper visualized thoracic spine.  No acute cervical spine abnormalities identified.    Lungs: Small amount of partially visualized layering left pleural fluid is seen.  Visualized lung are otherwise clear.                                        MRA Brain without contrast (Final result)  Result time 01/10/25 17:44:28      Final result by Milo Hay MD (01/10/25 17:44:28)                   Impression:      1. There is diminished flow signal and luminal narrowing in the distal MCA distribution bilaterally in the posterior parietal region in an area of infarction.  Vascular consultation and follow-up recommended.  CTA may better characterize.  2. There is bilateral flow signal loss in the distal vertebral arteries bilaterally of indeterminate etiology.  This could be artifact.  High-grade narrowing/diminished flow of the distal V4 segment of the left vertebral artery is a possibility.  Mild narrowing or diminished flow of the V4 segment of the right vertebral artery is not excluded also.  Basilar artery demonstrates normal flow signal.  CTA may better characterize.  Vascular consultation and follow-up recommended.  3. Posterior cerebral  arteries are patent proximally bilaterally.  P1 and P2 segments are adequately demonstrated.  Probable mild narrowing in the P2 segment on the left.. Possible mild luminal narrowing and diminished flow signal in the more distal components of the PCA distribution bilaterally. CTA may better characterize.  4. This report was flagged in Epic as abnormal.      Electronically signed by: Milo De Jesus  Date:    01/10/2025  Time:    17:44               Narrative:    EXAMINATION:  MRA BRAIN WITHOUT CONTRAST    CLINICAL HISTORY:  Syncope, simple, abnormal neuro exam; .    TECHNIQUE:  Non-contrast 3-D time-of-flight intracranial MR angiography was performed through the [place] with MIP reformatting.    COMPARISON:  None.    FINDINGS:  Anterior intracranial circulation: The anterior cerebral arteries appear adequately maintained throughout the course.    The middle cerebral arteries appear adequately maintained in the M1 and M2 segments.  There is diminished size of the distal MCA distribution branches in the bilateral posterior parietal region could be associated with thrombosis/partial thrombosis.  Acute infarction is noted in these areas on MRI.  Recommend vascular consultation and follow-up.    CTA may better characterize.    There is bilateral flow signal loss in the distal vertebral arteries bilaterally of indeterminate etiology.  This could be artifact.    High-grade narrowing/diminished flow of the distal V4 segment of the left vertebral artery is a possibility.    Mild narrowing or diminished flow of the V4 segment of the right vertebral artery is not excluded also.    Basilar artery demonstrates normal flow signal.    CTA may better characterize.    Posterior cerebral arteries are patent proximally bilaterally.  P1 and P2 segments are adequately demonstrated.  Probable mild narrowing in the P2 segment on the left..  Possible mild luminal narrowing and diminished flow signal in the more distal components of  the PCA distribution bilaterally.  CTA may better characterize.    Distal internal carotid arteries are patent bilaterally.  Mild signal loss in the region of the cavernous internal carotid arteries bilaterally.  CTA may better characterize.    Posterior intracranial circulation: No high-grade focal stenosis, occlusion, aneurysm, or vascular malformation.                                        MRA Neck without contrast (Final result)  Result time 01/10/25 17:40:26      Final result by Milo Hay MD (01/10/25 17:40:26)                   Impression:      Limited study.  High-grade narrowing of the proximal internal carotid arteries bilaterally is a consideration.  CTA would better characterize.    There is flow signal within the distal vertebral arteries bilaterally though significant narrowing of the V4 segments bilaterally left greater than right are possibilities.  CTA would better characterize.    This report was flagged in Epic as abnormal.      Electronically signed by: Milo Hay  Date:    01/10/2025  Time:    17:40               Narrative:    EXAMINATION:  MRA NECK WITHOUT CONTRAST    CLINICAL HISTORY:  Syncope, simple, abnormal neuro exam;    TECHNIQUE:  Noncontrast MRA of the neck.  Non-contrast 2D and/or 3D time-of-flight MR angiography of the neck was performed, with MIP reformatting.  No axial source images.  Large field-of-view coronal images.    COMPARISON:  None    FINDINGS:  Extracranial carotid circulation: Limited study with possible high-grade narrowing the proximal left ICA.  Questionable significant narrowing the proximal right ICA also.  Limited visualization.  CTA may better characterize.    Extracranial vertebral circulation: Probable narrowing of the distal V4 segments of the vertebral arteries left greater than right.  Limited visualization.  CT may better characterize.    No axial source images.    Skull/Extracranial Contents (limited evaluation): Bone marrow signal intensity is  normal.                                        MRI Brain Without Contrast (Final result)  Result time 01/10/25 17:14:53      Final result by Milo Hay MD (01/10/25 17:14:53)                   Impression:      1. Bilateral posterior parietal cortical infarcts right greater than left.  Recommend follow-up.  2. This report was flagged in Epic as abnormal.      Electronically signed by: Milo Hay  Date:    01/10/2025  Time:    17:14               Narrative:    EXAMINATION:  MRI BRAIN WITHOUT CONTRAST    CLINICAL HISTORY:  Stroke, follow up;.    TECHNIQUE:  Multiplanar multisequence MR imaging of the brain was performed without contrast.    COMPARISON:  01/09/2025    FINDINGS:  Intracranial compartment:    No midline shift or acute hydrocephalus.  No extra-axial blood or fluid collections.    Diffusion weighted imaging demonstrates restricted diffusion in the bilateral posterior parietal cortex, right greater than left consistent with acute infarction.  Follow-up recommended.    No mass lesion, acute hemorrhage, edema or acute infarct.    Normal vascular flow voids are preserved.    Skull/extracranial contents (limited evaluation): Bone marrow signal intensity is normal.                                       CT Lumbar Spine Without Contrast (Final result)  Result time 01/09/25 16:33:02      Final result by Manas Sorenson MD (01/09/25 16:33:02)                   Impression:      No CT evidence of lumbar spine acute osseous traumatic injury.    Lumbar spondylosis most prominent at L2-3, L4-5 and L5-S1 levels, as further outlined in the body of the report.    Other incidental/nonemergent findings in the body of the report.      Electronically signed by: Manas Sorenson MD  Date:    01/09/2025  Time:    16:33               Narrative:    EXAMINATION:  CT LUMBAR SPINE WITHOUT CONTRAST    CLINICAL HISTORY:  Low back pain, trauma;    TECHNIQUE:  Low-dose axial, sagittal and coronal reformations are obtained through  the lumbar spine.  Contrast was not administered.    COMPARISON:  Lumbar spine series 12/12/2024, MRI lumbar spine 07/17/2023, CT abdomen and pelvis 06/24/2023    FINDINGS:  Five non-rib-bearing lumbar type vertebral bodies.  Mild straightening of the usual lumbar lordosis.  Similar chronic minimal anterior wedge deformity of L1 vertebral body.  No evidence for acute vertebral compression fracture.  No displaced fracture, dislocation or significant listhesis.  Grossly stable nonspecific subcentimeter lucent lesion at the right iliac body near the SI joint.  No destructive osseous process.  No pars defect.  Multilevel degenerative disc disease with loss of disc height, endplate changes, marginal osteophytes with posterior disc osteophyte complexes and facet arthrosis most prominent at L4-5 level, not significantly progressed from most previous imaging.  Suspected multilevel small Schmorl's nodes, similar to prior.  No significant prevertebral soft tissue thickening.  No paraspinal mass or fluid collection.  No subcutaneous emphysema or radiopaque foreign body.    L1-2: Minimal bilateral neural foraminal narrowing, more so on the right.  No significant spinal canal stenosis.    L2-3: Posterior broad-based disc bulge resulting in mild acquired canal stenosis.  Mild bilateral neural foraminal narrowing, right more so than left.    L3-4: Posterior broad-based disc bulge resulting in minimal acquired canal stenosis.  Mild right and minimal left neural foraminal narrowing.    L4-5: Posterior disc osteophyte complex resulting in at least moderate acquired canal stenosis.  Moderate bilateral neural foraminal narrowing, left more so than right.    L5-S1: Posterior disc osteophyte complex asymmetric to the right resulting in minimal acquired canal stenosis.  Moderate to severe bilateral foraminal narrowing, left more so than right.    Scattered atherosclerotic vascular calcifications.  No acute abnormality seen within the  imaged abdomen or pelvis.  Age-related mild to moderate degenerative change at the bilateral SI joints.                                       CT Head Without Contrast (Final result)  Result time 01/09/25 12:09:59      Final result by Rivera Raza MD (01/09/25 12:09:59)                   Impression:      1. No definite acute intracranial findings by noncontrast CT.  2. Partially imaged soft tissue lesions within the superficial lobes of the bilateral parotid glands, with differential considerations to include intraparotid lymph nodes versus benign or malignant primary parotid neoplasms.  Further characterization with ultrasound and possible tissue sampling would be recommended.      Electronically signed by: Rivera Raza  Date:    01/09/2025  Time:    12:09               Narrative:    EXAMINATION:  CT HEAD WITHOUT CONTRAST    CLINICAL HISTORY:  Mental status change, unknown cause; Altered mental status, unspecified    TECHNIQUE:  Low dose axial images were obtained through the head.  Coronal and sagittal reformations were also performed. Contrast was not administered.    COMPARISON:  MRI of the brain performed 12/20/2024.    FINDINGS:  Blood: No acute intracranial hemorrhage.    Parenchyma: No definite loss of gray-white differentiation to suggest acute or subacute transcortical infarct. Generalized pattern of age-related parenchymal volume loss.  Nonspecific areas of white matter hypoattenuation may reflect sequela of chronic small vessel ischemic disease.    Ventricles/Extra-axial spaces: No abnormal extra-axial fluid collection. Basal cisterns are patent.    Vessels: Moderate atherosclerotic calcifications.    Orbits: Status post bilateral lens replacements.    Scalp: Unremarkable.    Skull: There are no depressed skull fractures or destructive bone lesions.  Chronic appearing nasal bone deformity.    Sinuses and mastoids: Relatively minimal paranasal sinus mucosal thickening with small retention  cysts.    Other findings: Partially imaged ovoid soft tissue lesions in the superficial lobes of bilateral parotid glands, measuring up to 11 mm on the right and 12 mm on the left (for example as seen on series 3, image 1).

## 2025-01-11 NOTE — ADDENDUM NOTE
Addendum  created 01/11/25 1536 by Cathy Miranda CRNA    Child order released for a procedure order, Clinical Note Signed, Flowsheet accepted, Intraprocedure Blocks edited, Intraprocedure Flowsheets edited, LDA created via procedure documentation, SmartForm saved

## 2025-01-11 NOTE — CONSULTS
Interventional Neuroradiology Pre-procedure Note    Procedure: Diagnostic cerebral angiogram and FERMÍN    History of Present Illness:  Driss Hernandez Jr. is a 74 y.o. male who is transferred form [facility] for urgent intervention. NIHSS on tele [facility]. Consult was 15. On arrival, repeat exam demonstrated NIHSS 7 at [facility]    ROS:   UTO    Scheduled Meds:    apixaban  5 mg Oral BID    atorvastatin  80 mg Oral QHS    [START ON 1/12/2025] clopidogreL  75 mg Oral Daily    fenofibrate  160 mg Oral QAM    fluticasone propionate  2 spray Each Nostril BID    mupirocin   Nasal BID    pantoprazole  40 mg Oral Daily    polyethylene glycol  17 g Oral Daily     Current Meds:   Current Facility-Administered Medications:     acetaminophen tablet 650 mg, 650 mg, Oral, Q4H PRN, Agustin Brooks MD    albuterol sulfate nebulizer solution 2.5 mg, 2.5 mg, Nebulization, Q4H PRN, Keyur Mello MD    aluminum-magnesium hydroxide-simethicone 200-200-20 mg/5 mL suspension 30 mL, 30 mL, Oral, QID PRN, Agustin Brooks MD    apixaban tablet 5 mg, 5 mg, Oral, BID, Agustin Brooks MD, 5 mg at 01/11/25 0813    atorvastatin tablet 80 mg, 80 mg, Oral, QHS, Agustin Brooks MD, 80 mg at 01/10/25 2106    [START ON 1/12/2025] clopidogreL tablet 75 mg, 75 mg, Oral, Daily, Biju Talavera PA-C    diphenhydrAMINE capsule 25 mg, 25 mg, Oral, Q6H PRN, Agustin Brooks MD, 25 mg at 01/11/25 0913    fenofibrate tablet 160 mg, 160 mg, Oral, QAM, Agustin Brooks MD, 160 mg at 01/11/25 0814    fluticasone propionate 50 mcg/actuation nasal spray 100 mcg, 2 spray, Each Nostril, BID, Agustin Brooks MD, 100 mcg at 01/11/25 0815    glucagon (human recombinant) injection 1 mg, 1 mg, Intramuscular, PRN, Agustin Brooks MD    glucose chewable tablet 16 g, 16 g, Oral, PRN, Agustin Brooks MD    glucose chewable tablet 24 g, 24 g, Oral, PRN, Agustin Brooks MD    hydrALAZINE injection 5 mg, 5 mg, Intravenous, Q6H PRN, Biju Talavera,  MIKEY    HYDROcodone-acetaminophen  mg per tablet 1 tablet, 1 tablet, Oral, Q4H PRN, Agustin Brooks MD, 1 tablet at 01/11/25 0918    insulin aspart U-100 pen 0-5 Units, 0-5 Units, Subcutaneous, Q4H PRN, Agustin Brooks MD    meclizine tablet 25 mg, 25 mg, Oral, TID PRN, Agustin Brooks MD    melatonin tablet 6 mg, 6 mg, Oral, Nightly PRN, Agustin Brooks MD, 6 mg at 01/10/25 2240    mupirocin 2 % ointment, , Nasal, BID, Keyur Mello MD    naloxone 0.4 mg/mL injection 0.02 mg, 0.02 mg, Intravenous, PRN, Agustin Brooks MD    nitroGLYCERIN SL tablet 0.4 mg, 0.4 mg, Sublingual, Q5 Min PRN, Agustin Brooks MD    OLANZapine 5 mg/mL injection 2.5 mg, 2.5 mg, Intravenous, Once PRN, Agustin Brooks MD    ondansetron injection 4 mg, 4 mg, Intravenous, Q6H PRN, Agustin Brooks MD    pantoprazole EC tablet 40 mg, 40 mg, Oral, Daily, Agustin Brooks MD, 40 mg at 01/11/25 0813    polyethylene glycol packet 17 g, 17 g, Oral, Daily, Agustin Brooks MD, 17 g at 01/10/25 0850    prochlorperazine injection Soln 5 mg, 5 mg, Intravenous, Q6H PRN, Agustin Brooks MD    sodium chloride 0.9% flush 10 mL, 10 mL, Intravenous, Q12H PRN, Agustin Brooks MD    sodium chloride 0.9% flush 10 mL, 10 mL, Intravenous, PRN, Biju Talavera PA-C    Facility-Administered Medications Ordered in Other Encounters:     cyclopentolate 1% ophthalmic solution 1 drop, 1 drop, Left Eye, On Call Procedure, Nickolas Hong MD, 1 drop at 02/24/22 0901    ofloxacin 0.3 % ophthalmic solution 1 drop, 1 drop, Left Eye, On Call Procedure, Nickolas Hong MD, 2 drop at 02/24/22 1017    sodium chloride 0.9% flush 10 mL, 10 mL, Intravenous, PRN, Nickolas Hong MD    triamcinolone acetonide injection 40 mg, 40 mg, Intra-articular, , Valerie Olivera MD, 40 mg at 02/09/23 1030   Continuous Infusions:   PRN Meds:  Current Facility-Administered Medications:     acetaminophen, 650 mg, Oral, Q4H PRN    albuterol  "sulfate, 2.5 mg, Nebulization, Q4H PRN    aluminum-magnesium hydroxide-simethicone, 30 mL, Oral, QID PRN    diphenhydrAMINE, 25 mg, Oral, Q6H PRN    glucagon (human recombinant), 1 mg, Intramuscular, PRN    glucose, 16 g, Oral, PRN    glucose, 24 g, Oral, PRN    hydrALAZINE, 5 mg, Intravenous, Q6H PRN    HYDROcodone-acetaminophen, 1 tablet, Oral, Q4H PRN    insulin aspart U-100, 0-5 Units, Subcutaneous, Q4H PRN    meclizine, 25 mg, Oral, TID PRN    melatonin, 6 mg, Oral, Nightly PRN    naloxone, 0.02 mg, Intravenous, PRN    nitroGLYCERIN, 0.4 mg, Sublingual, Q5 Min PRN    OLANZapine, 2.5 mg, Intravenous, Once PRN    ondansetron, 4 mg, Intravenous, Q6H PRN    prochlorperazine, 5 mg, Intravenous, Q6H PRN    sodium chloride 0.9%, 10 mL, Intravenous, Q12H PRN    sodium chloride 0.9%, 10 mL, Intravenous, PRN    Allergies:   Review of patient's allergies indicates:   Allergen Reactions    Penicillins Other (See Comments)     Unknown reaction, Had a reaction as a child    Shrimp Itching     Hand Itching     Sedation Hx: No adverse events.    Labs:     WBC/Hgb/Hct/Plts:  6.38/12.7/40.9/215 (01/11 0431)  BUN/Cr/glu/ALT/AST/amyl/lip:  21/1.1/--/14/19/--/-- (01/11 0431)     Objective:  Vitals: Blood pressure (!) 227/94, pulse 65, temperature 98.9 °F (37.2 °C), temperature source Axillary, resp. rate (!) 24, height 5' 7" (1.702 m), weight 79.8 kg (175 lb 14.8 oz), SpO2 100%.     Physical Exam:  Alert and follows commands, left facial droop, LUE 3/5, LLE 4/5, dense left hemisensory loss, left visual and tactile hemineglect     ASA: 3  MAL: Pt will be intubated    Plan:  -Plan for cerebral angiogram with possible intervention  -Sedation Plan: General anesthesia  -All diagnostics and imaging reviewed  -Risks & benefits of procedure explained in detail; patient's spouse consented and all questions answered  -Further reccs to follow procedure          Star Viramontes MD, MHA  Fellow, NeuroEndovascular Surgery, [facility] " [place]  Neurologist, [facility] [place], [place]

## 2025-01-11 NOTE — PLAN OF CARE
Pt arrived to IR room 4 for thrombectomy with anesthesia. Pt oriented to unit and staff, Pt safely transferred from stretcher to procedural table. Fall risk reviewed and comfort measures utilized with interventions. Safety strap applied, position pillows to minimize pressure points. Blankets applied. Pt prepped and draped utilizing standard sterile technique. Patient placed on continuous monitoring, as required by sedation policy. Timeouts implemented utilizing standard universal time-out per department and facility policy. CRNA to remain at bedside with continuous monitoring. Pt resting comfortably. Denies pain/discomfort. Will continue to monitor. See flow sheets for monitoring, medication administration, and updates. patient verbalizes understanding.

## 2025-01-11 NOTE — SUBJECTIVE & OBJECTIVE
HPI:  74 y.o. male with Several spinal and balance issues, Chronic pain on oxycodone, Non-Insulin-dependent DM2 with neuropathic complications, CAD s/p CABG, HTN, HLD, AFib on Eliquis, and CKD 3 who presented to [facility] ED on 01/09/2025 for eval and treatment of a fall and near syncopal event.  He underwent L4-L5 laminectomy for decompression on January 6 and his Eliquis was held. Per EMS, pt was at home using the bathroom when he felt light-headed and had a syncopal episode. He was found by a family member on the ground and has been confused since the fall.  Patient's MRI was done that showed bilateral parietal lobe acute infarctions.  Patient exam had improved completely but he again developed left hemiplegia and dysarthria with confusion at 9:57 a.m. this morning.  Tele vascular neurology was consulted further recommendations.  On urgent consult, patient has left hemiplegia, left facial droop, dysarthria and mild right gaze preference, left ictal katiuska-neglect.  Urgent CT angio head and neck was recommended.      Images personally reviewed and interpreted:  Study: CTA Head & Neck  Study Interpretation:  CT angio head and neck demonstrates right inferior M2 occlusion that was not present on the previous CT angio    Physical exam:  Awake and follows some commands, answered 1 question, left lower facial asymmetry, LUE 0/5, LLE 2/5, RUE and RLE 4/5, dense sensory loss in the left face +arm + leg, mild-to-moderate dysarthria, mild aphasia, left tactile katiuska-neglect    NIHSS ([employer] [place] [employer] Stroke Scale)   1a  Level of consciousness: 1=not alert but arousable by minor stimulation to obey, answer or respond   1b. LOC questions:  1 = Answers one question correctly   1c. LOC commands: 1=Answers one task correctly   2.  Best Gaze: 1=partial gaze palsy   3. Visual: 0=No visual loss   4. Facial Palsy: 2=Partial paralysis (total or near total paralysis of the lower face)   5a. Motor left arm: 4=No movement    5b.  Motor right arm: 0=No drift, limb holds 90 (or 45) degrees for full 10 seconds   6a. motor left le=Some effort against gravity, limb cannot get to or maintain (if cured) 90 (or 45) degrees, drifts down to bed, but has some effort against gravity   6b  Motor right le=No drift, limb holds 90 (or 45) degrees for full 10 seconds   7. Limb Ataxia: 0=Absent   8.  Sensory: 2=Severe to total sensory loss; patient is not aware of being touched in face, arm, leg   9. Best Language:  1=Mild to moderate aphasia; some obvious loss of fluency or facility of comprehension without significant limitation on ideas expressed or form of expression.   10. Dysarthria: 1=Mild to moderate, patient slurs at least some words and at worst, can be understood with some difficulty   11. Extinction and Inattention: 1=Visual, tactile, auditory, spatial or personal inattention or extinction to bilateral simultaneous stimulation in one of the sensory modalities         Total:   15            Assessment and plan:  -patient is not a candidate for IV thrombolysis given recent spinal surgery  -recommend urgent transferred to [facility] [address] for mechanical thrombectomy    Findings were discussed with patient's provider from [facility].              Star Viramontes MD, MHA  Fellow, NeuroEndovascular Surgery, [facility]  Neurologist, [facility] [place], [place]

## 2025-01-11 NOTE — ASSESSMENT & PLAN NOTE
Presented after a fall vs near syncope at home. Oriented to most topics of conversation, but disoriented to specifics and not baseline mental status per wife. CT head without abnormality. MRI with bilateral infarcts suspect cause of confusion. Possible embolic as had to stop eliquis for surgery. Discussed with neurology overnight and no acute intervention required.   Neuro consulted  Resume eliquis  Permissive HTN, will resume BP meds tomorrow AM  Echo without thrombus  EEG without active seizures.

## 2025-01-12 PROBLEM — I27.21 PULMONARY ARTERY HYPERTENSION: Status: RESOLVED | Noted: 2025-01-11 | Resolved: 2025-01-12

## 2025-01-12 LAB
ALBUMIN SERPL BCP-MCNC: 3.2 G/DL (ref 3.5–5.2)
ALP SERPL-CCNC: 30 U/L (ref 40–150)
ALT SERPL W/O P-5'-P-CCNC: 12 U/L (ref 10–44)
ANION GAP SERPL CALC-SCNC: 14 MMOL/L (ref 8–16)
AST SERPL-CCNC: 22 U/L (ref 10–40)
BASOPHILS # BLD AUTO: 0.03 K/UL (ref 0–0.2)
BASOPHILS NFR BLD: 0.4 % (ref 0–1.9)
BILIRUB SERPL-MCNC: 1.1 MG/DL (ref 0.1–1)
BUN SERPL-MCNC: 17 MG/DL (ref 8–23)
CALCIUM SERPL-MCNC: 9.2 MG/DL (ref 8.7–10.5)
CHLORIDE SERPL-SCNC: 105 MMOL/L (ref 95–110)
CO2 SERPL-SCNC: 18 MMOL/L (ref 23–29)
CREAT SERPL-MCNC: 0.9 MG/DL (ref 0.5–1.4)
DIFFERENTIAL METHOD BLD: ABNORMAL
EOSINOPHIL # BLD AUTO: 0.3 K/UL (ref 0–0.5)
EOSINOPHIL NFR BLD: 3.5 % (ref 0–8)
ERYTHROCYTE [DISTWIDTH] IN BLOOD BY AUTOMATED COUNT: 14.6 % (ref 11.5–14.5)
EST. GFR  (NO RACE VARIABLE): >60 ML/MIN/1.73 M^2
GLUCOSE SERPL-MCNC: 74 MG/DL (ref 70–110)
HCT VFR BLD AUTO: 40.4 % (ref 40–54)
HGB BLD-MCNC: 13.2 G/DL (ref 14–18)
IMM GRANULOCYTES # BLD AUTO: 0.03 K/UL (ref 0–0.04)
IMM GRANULOCYTES NFR BLD AUTO: 0.4 % (ref 0–0.5)
LYMPHOCYTES # BLD AUTO: 1.1 K/UL (ref 1–4.8)
LYMPHOCYTES NFR BLD: 15.2 % (ref 18–48)
MAGNESIUM SERPL-MCNC: 1.7 MG/DL (ref 1.6–2.6)
MCH RBC QN AUTO: 26.7 PG (ref 27–31)
MCHC RBC AUTO-ENTMCNC: 32.7 G/DL (ref 32–36)
MCV RBC AUTO: 82 FL (ref 82–98)
MONOCYTES # BLD AUTO: 0.7 K/UL (ref 0.3–1)
MONOCYTES NFR BLD: 9.9 % (ref 4–15)
NEUTROPHILS # BLD AUTO: 5.3 K/UL (ref 1.8–7.7)
NEUTROPHILS NFR BLD: 70.6 % (ref 38–73)
NRBC BLD-RTO: 0 /100 WBC
OHS QRS DURATION: 88 MS
OHS QRS DURATION: 88 MS
OHS QTC CALCULATION: 403 MS
OHS QTC CALCULATION: 424 MS
PHOSPHATE SERPL-MCNC: 3 MG/DL (ref 2.7–4.5)
PLATELET # BLD AUTO: 238 K/UL (ref 150–450)
PMV BLD AUTO: 9.6 FL (ref 9.2–12.9)
POCT GLUCOSE: 152 MG/DL (ref 70–110)
POCT GLUCOSE: 70 MG/DL (ref 70–110)
POCT GLUCOSE: 80 MG/DL (ref 70–110)
POTASSIUM SERPL-SCNC: 3.9 MMOL/L (ref 3.5–5.1)
PROT SERPL-MCNC: 6.9 G/DL (ref 6–8.4)
RBC # BLD AUTO: 4.95 M/UL (ref 4.6–6.2)
SODIUM SERPL-SCNC: 137 MMOL/L (ref 136–145)
WBC # BLD AUTO: 7.5 K/UL (ref 3.9–12.7)

## 2025-01-12 PROCEDURE — 51798 US URINE CAPACITY MEASURE: CPT | Mod: HCNC

## 2025-01-12 PROCEDURE — 25000003 PHARM REV CODE 250: Mod: HCNC

## 2025-01-12 PROCEDURE — 25000003 PHARM REV CODE 250: Mod: HCNC | Performed by: PSYCHIATRY & NEUROLOGY

## 2025-01-12 PROCEDURE — 83735 ASSAY OF MAGNESIUM: CPT | Mod: HCNC

## 2025-01-12 PROCEDURE — 97530 THERAPEUTIC ACTIVITIES: CPT | Mod: HCNC

## 2025-01-12 PROCEDURE — 25000242 PHARM REV CODE 250 ALT 637 W/ HCPCS: Mod: HCNC

## 2025-01-12 PROCEDURE — 99291 CRITICAL CARE FIRST HOUR: CPT | Mod: HCNC,,,

## 2025-01-12 PROCEDURE — 99233 SBSQ HOSP IP/OBS HIGH 50: CPT | Mod: HCNC,GC,, | Performed by: STUDENT IN AN ORGANIZED HEALTH CARE EDUCATION/TRAINING PROGRAM

## 2025-01-12 PROCEDURE — 97535 SELF CARE MNGMENT TRAINING: CPT | Mod: HCNC

## 2025-01-12 PROCEDURE — 25000003 PHARM REV CODE 250: Mod: HCNC | Performed by: HOSPITALIST

## 2025-01-12 PROCEDURE — 80053 COMPREHEN METABOLIC PANEL: CPT | Mod: HCNC

## 2025-01-12 PROCEDURE — 63600175 PHARM REV CODE 636 W HCPCS: Mod: JZ,TB,HCNC

## 2025-01-12 PROCEDURE — 99499 UNLISTED E&M SERVICE: CPT | Mod: HCNC,,, | Performed by: PSYCHIATRY & NEUROLOGY

## 2025-01-12 PROCEDURE — 97116 GAIT TRAINING THERAPY: CPT | Mod: HCNC

## 2025-01-12 PROCEDURE — 51701 INSERT BLADDER CATHETER: CPT | Mod: HCNC

## 2025-01-12 PROCEDURE — 20600001 HC STEP DOWN PRIVATE ROOM: Mod: HCNC

## 2025-01-12 PROCEDURE — 97166 OT EVAL MOD COMPLEX 45 MIN: CPT | Mod: HCNC

## 2025-01-12 PROCEDURE — 92610 EVALUATE SWALLOWING FUNCTION: CPT | Mod: HCNC

## 2025-01-12 PROCEDURE — 84100 ASSAY OF PHOSPHORUS: CPT | Mod: HCNC

## 2025-01-12 PROCEDURE — 63600175 PHARM REV CODE 636 W HCPCS: Mod: HCNC

## 2025-01-12 PROCEDURE — 97162 PT EVAL MOD COMPLEX 30 MIN: CPT | Mod: HCNC

## 2025-01-12 PROCEDURE — 94761 N-INVAS EAR/PLS OXIMETRY MLT: CPT | Mod: HCNC

## 2025-01-12 PROCEDURE — 85025 COMPLETE CBC W/AUTO DIFF WBC: CPT | Mod: HCNC

## 2025-01-12 RX ORDER — IBUPROFEN 200 MG
24 TABLET ORAL
Status: DISCONTINUED | OUTPATIENT
Start: 2025-01-13 | End: 2025-01-15 | Stop reason: HOSPADM

## 2025-01-12 RX ORDER — GLUCAGON 1 MG
1 KIT INJECTION
Status: DISCONTINUED | OUTPATIENT
Start: 2025-01-13 | End: 2025-01-15 | Stop reason: HOSPADM

## 2025-01-12 RX ORDER — HYDRALAZINE HYDROCHLORIDE 25 MG/1
50 TABLET, FILM COATED ORAL EVERY 12 HOURS
Status: DISCONTINUED | OUTPATIENT
Start: 2025-01-12 | End: 2025-01-15 | Stop reason: HOSPADM

## 2025-01-12 RX ORDER — OXYCODONE HYDROCHLORIDE 5 MG/1
5 TABLET ORAL EVERY 4 HOURS PRN
Status: DISCONTINUED | OUTPATIENT
Start: 2025-01-12 | End: 2025-01-15 | Stop reason: HOSPADM

## 2025-01-12 RX ORDER — TAMSULOSIN HYDROCHLORIDE 0.4 MG/1
0.4 CAPSULE ORAL DAILY
Status: DISCONTINUED | OUTPATIENT
Start: 2025-01-12 | End: 2025-01-15 | Stop reason: HOSPADM

## 2025-01-12 RX ORDER — METHYLPREDNISOLONE SOD SUCC 125 MG
125 VIAL (EA) INJECTION ONCE
Status: COMPLETED | OUTPATIENT
Start: 2025-01-12 | End: 2025-01-12

## 2025-01-12 RX ORDER — HYDRALAZINE HYDROCHLORIDE 20 MG/ML
10 INJECTION INTRAMUSCULAR; INTRAVENOUS ONCE
Status: COMPLETED | OUTPATIENT
Start: 2025-01-12 | End: 2025-01-12

## 2025-01-12 RX ORDER — AMOXICILLIN 250 MG
2 CAPSULE ORAL 2 TIMES DAILY
Status: DISCONTINUED | OUTPATIENT
Start: 2025-01-13 | End: 2025-01-15 | Stop reason: HOSPADM

## 2025-01-12 RX ORDER — POLYETHYLENE GLYCOL 3350 17 G/17G
17 POWDER, FOR SOLUTION ORAL 2 TIMES DAILY
Status: DISCONTINUED | OUTPATIENT
Start: 2025-01-13 | End: 2025-01-15 | Stop reason: HOSPADM

## 2025-01-12 RX ORDER — ISOSORBIDE MONONITRATE 60 MG/1
120 TABLET, EXTENDED RELEASE ORAL DAILY
Status: DISCONTINUED | OUTPATIENT
Start: 2025-01-12 | End: 2025-01-15 | Stop reason: HOSPADM

## 2025-01-12 RX ORDER — INSULIN ASPART 100 [IU]/ML
0-10 INJECTION, SOLUTION INTRAVENOUS; SUBCUTANEOUS
Status: DISCONTINUED | OUTPATIENT
Start: 2025-01-12 | End: 2025-01-15 | Stop reason: HOSPADM

## 2025-01-12 RX ORDER — IBUPROFEN 200 MG
16 TABLET ORAL
Status: DISCONTINUED | OUTPATIENT
Start: 2025-01-13 | End: 2025-01-15 | Stop reason: HOSPADM

## 2025-01-12 RX ADMIN — FLUTICASONE PROPIONATE 100 MCG: 50 SPRAY, METERED NASAL at 10:01

## 2025-01-12 RX ADMIN — METHYLPREDNISOLONE SODIUM SUCCINATE 125 MG: 125 INJECTION, POWDER, FOR SOLUTION INTRAMUSCULAR; INTRAVENOUS at 10:01

## 2025-01-12 RX ADMIN — MUPIROCIN: 20 OINTMENT TOPICAL at 08:01

## 2025-01-12 RX ADMIN — HYDROCORTISONE: 25 CREAM TOPICAL at 06:01

## 2025-01-12 RX ADMIN — SENNOSIDES AND DOCUSATE SODIUM 1 TABLET: 50; 8.6 TABLET ORAL at 08:01

## 2025-01-12 RX ADMIN — HYDRALAZINE HYDROCHLORIDE 10 MG: 20 INJECTION, SOLUTION INTRAMUSCULAR; INTRAVENOUS at 09:01

## 2025-01-12 RX ADMIN — POLYETHYLENE GLYCOL 3350 17 G: 17 POWDER, FOR SOLUTION ORAL at 09:01

## 2025-01-12 RX ADMIN — ACETAMINOPHEN 650 MG: 325 TABLET ORAL at 09:01

## 2025-01-12 RX ADMIN — HYDRALAZINE HYDROCHLORIDE 10 MG: 20 INJECTION, SOLUTION INTRAMUSCULAR; INTRAVENOUS at 05:01

## 2025-01-12 RX ADMIN — OXYCODONE 10 MG: 5 TABLET ORAL at 12:01

## 2025-01-12 RX ADMIN — HYDROCORTISONE: 25 CREAM TOPICAL at 08:01

## 2025-01-12 RX ADMIN — LABETALOL HYDROCHLORIDE 10 MG: 5 INJECTION, SOLUTION INTRAVENOUS at 07:01

## 2025-01-12 RX ADMIN — INSULIN ASPART 3 UNITS: 100 INJECTION, SOLUTION INTRAVENOUS; SUBCUTANEOUS at 11:01

## 2025-01-12 RX ADMIN — FENOFIBRATE 48 MG: 48 TABLET, FILM COATED ORAL at 08:01

## 2025-01-12 RX ADMIN — HYDROXYZINE HYDROCHLORIDE 25 MG: 25 TABLET, FILM COATED ORAL at 07:01

## 2025-01-12 RX ADMIN — CARBOXYMETHYLCELLULOSE SODIUM 1 DROP: 10 GEL OPHTHALMIC at 04:01

## 2025-01-12 RX ADMIN — ISOSORBIDE MONONITRATE 120 MG: 60 TABLET, EXTENDED RELEASE ORAL at 08:01

## 2025-01-12 RX ADMIN — CARBOXYMETHYLCELLULOSE SODIUM 1 DROP: 10 GEL OPHTHALMIC at 12:01

## 2025-01-12 RX ADMIN — HYDRALAZINE HYDROCHLORIDE 50 MG: 25 TABLET ORAL at 08:01

## 2025-01-12 RX ADMIN — PANTOPRAZOLE SODIUM 40 MG: 20 TABLET, DELAYED RELEASE ORAL at 08:01

## 2025-01-12 RX ADMIN — HYDRALAZINE HYDROCHLORIDE 10 MG: 20 INJECTION, SOLUTION INTRAMUSCULAR; INTRAVENOUS at 06:01

## 2025-01-12 RX ADMIN — HYDROCORTISONE: 25 CREAM TOPICAL at 12:01

## 2025-01-12 RX ADMIN — APIXABAN 5 MG: 5 TABLET, FILM COATED ORAL at 08:01

## 2025-01-12 RX ADMIN — ACETAMINOPHEN 650 MG: 325 TABLET ORAL at 06:01

## 2025-01-12 RX ADMIN — DIPHENHYDRAMINE HYDROCHLORIDE 25 MG: 50 INJECTION, SOLUTION INTRAMUSCULAR; INTRAVENOUS at 12:01

## 2025-01-12 RX ADMIN — ATORVASTATIN CALCIUM 80 MG: 40 TABLET, FILM COATED ORAL at 08:01

## 2025-01-12 RX ADMIN — TAMSULOSIN HYDROCHLORIDE 0.4 MG: 0.4 CAPSULE ORAL at 08:01

## 2025-01-12 RX ADMIN — ALUMINUM HYDROXIDE, MAGNESIUM HYDROXIDE, AND SIMETHICONE 30 ML: 1200; 120; 1200 SUSPENSION ORAL at 08:01

## 2025-01-12 RX ADMIN — CLOPIDOGREL BISULFATE 75 MG: 75 TABLET ORAL at 08:01

## 2025-01-12 RX ADMIN — HYDROXYZINE HYDROCHLORIDE 25 MG: 25 TABLET, FILM COATED ORAL at 08:01

## 2025-01-12 NOTE — ASSESSMENT & PLAN NOTE
Stroke risk-factor. Echo on 1/10 with Afib. On Eliquis: received at 20:40 on 1/10, and at 08:13 on 1/11, both prior to acute neurologic decline (right MCA syndrome). Eliquis restarted yesterday evening.    -continue apixaban 5mg BID

## 2025-01-12 NOTE — PLAN OF CARE
Problem: Occupational Therapy  Goal: Occupational Therapy Goal  Description: Goals to be met by: 2/12/2025     Patient will increase functional independence with ADLs by performing:    UE Dressing with Supervision.  LE Dressing with Supervsion.  Grooming while standing at sink with Supervision.  Toileting from toilet with Supervision for hygiene and clothing management.   Step transfer with Supervision  Toilet transfer to toilet with Supervision.  100% reciprocation and integration of 3/3 spinal precautions, DME recommendations, and ADL/task modifications post-sx to support safe d/c at highest level of function.      DME Justifications (see above for complete DME recommendations)    Rolling Walker- Patient demonstrates a mobility limitation that significantly impairs their ability to participate in one or more mobility related activities of daily living. Patient's mobility limitation cannot be sufficiently resolved with the use of a cane, but can be sufficiently resolved with the use of a rolling walker.The use of a rolling walker will considerably improve their ability to participate in MRADLs. Patient will use the walker on a regular basis at home.      Tub transfer bench - Patient demonstrates a mobility limitation that impairs their ability to participate in bathing within shower tub combination safely. Patient's limitation cannot be sufficiently resolved without the use of a tub transfer bench. The use of the tub transfer bench will considerably improve their ability to safely participate in bathing / shower transfers. Patient will use the tub transfer bench on a regular basis at home.    Outcome: Progressing     Patient tolerated OT eval. Goals and POC established. See note for further details.

## 2025-01-12 NOTE — H&P
[facility] - Neuro Critical Care  Neurocritical Care  History & Physical    Admit Date: 1/9/2025  Service Date: 01/11/2025  Length of Stay: 1    Subjective:     Chief Complaint: CVA (cerebral vascular accident)    History of Present Illness: 75 y/o M PMH HTN, HLD, Afib on Eliquis (last received on 1/11 AM), CAD s/p CABG, CKD3, insulin dependent diabetes. He had a MIS Right L4-5 laminoforaminotomy 1/6 and was off of his AC/AP during that time. At home, after the procedure, he noticed R hand numbness/weakness and did not seek treatment. He was then admitted to [facility] overnight 1/9 following a syncopal episode w/ fall and encephalopathy. At the OSH, MRI demonstrated bilateral MCA/PCA infarcts, MRA showed diminished flow in the distal BL MCA, EEG showed posterior slowing, and CTA head/neck. Patient exhibited an acute change this morning around 10AM, described as left hemiplegia and dysarthria. Stroke code called, and repeat CTA demonstrated R M2 occlusion. Patient was transferred to [facility] for higher level of care. NIHSS on arrival of 8 (left-sided inattention/neglect, dysarthria, LUE drift). Taken for DSA which showed recanalization of R M2, no intervention performed. Admitted to [facility] for post-procedure monitoring and higher level of care.      Past Medical History:   Diagnosis Date    Chronic heart failure with preserved ejection fraction 2/9/2023    Chronic midline low back pain without sciatica 10/2/2017    Colon polyps     Coronary artery disease involving native coronary artery of native heart 3/5/2020    Coronary artery disease involving native coronary artery of native heart with angina pectoris 12/10/2020    CVA (cerebral vascular accident) 1/11/2025    Diabetes mellitus with neurological manifestations, uncontrolled 1/24/2017    Diabetic polyneuropathy associated with type 2 diabetes mellitus 1/24/2017    Diabetic polyneuropathy associated with type 2 diabetes mellitus     Encephalopathy 1/10/2025     Essential hypertension 1/24/2017    Gastroesophageal reflux disease 1/24/2017    Hyperlipidemia 1/24/2017    Insomnia 1/24/2017    Long-term insulin use 1/24/2017    Longstanding persistent atrial fibrillation 12/30/2020    Lumbar spondylosis 11/13/2017    Nuclear sclerosis of both eyes 8/24/2017    Obesity     PAD (peripheral artery disease) 3/23/2022    Stage 3 chronic kidney disease 2/13/2019    Uncontrolled type 2 diabetes mellitus without complication, with long-term current use of insulin 1/24/2017     Past Surgical History:   Procedure Laterality Date    ARTHROSCOPIC REPAIR OF ROTATOR CUFF OF SHOULDER Right 7/5/2022    Procedure: REPAIR, ROTATOR CUFF, ARTHROSCOPIC;  Surgeon: Valerie Olivera MD;  Location: [facility] OR;  Service: Orthopedics;  Laterality: Right;  HETAL LEMUS [phone]/ TEXTED ALLAN ON 6/23/2022 @ 9:47AM. ALLAN RESPONDED ON 6/23/2022 @ 10:33AM-LO  JEAN PAUL BABIN [phone] MY BE HERE FOR CASE SPOKE TO HIM @ 9:59AM ON 7-1-2022  RN PREOP 6/28/2022    BACK SURGERY      2002    CATARACT EXTRACTION W/  INTRAOCULAR LENS IMPLANT Right 08/29/2017    Dr. Hong    CATARACT EXTRACTION W/  INTRAOCULAR LENS IMPLANT Left 02/24/2022    Dr. Hong    CAUDAL EPIDURAL STEROID INJECTION N/A 9/25/2024    Procedure: Caudal Epidural Steroid Injection;  Surgeon: Eze Byrd Jr., MD;  Location: [facility] PAIN MANAGEMENT;  Service: Pain Management;  Laterality: N/A;  @1100(prev. 715)  Eliquis last 9/21  Plavix last 9/19  Check BG  E-Consent    COLONOSCOPY N/A 2/21/2017    Procedure: COLONOSCOPY;  Surgeon: Robb Rosado MD;  Location: [facility] ENDO;  Service: Endoscopy;  Laterality: N/A;    COLONOSCOPY N/A 7/5/2023    Procedure: COLONOSCOPY;  Surgeon: Aston Varela MD;  Location: [facility] ENDO;  Service: Endoscopy;  Laterality: N/A;  Referral: JOVANNI SCANLON  ok to hold Plavix 5 days and Eliquis 2 days per Dr Ford   Meds  Suprep   Inst portal   LW    CORONARY ANGIOGRAPHY INCLUDING  BYPASS GRAFTS WITH CATHETERIZATION OF LEFT HEART N/A 11/11/2020    Procedure: ANGIOGRAM, CORONARY, INCLUDING BYPASS GRAFT, WITH LEFT HEART CATHETERIZATION;  Surgeon: Lane Cuellar MD;  Location: [facility] CATH LAB;  Service: Cardiology;  Laterality: N/A;  RN PRE OP 11-5-2020. --COVID NEGATIVE ON  11-. C A    CORONARY ARTERY BYPASS GRAFT      March 2016    EPIDURAL STEROID INJECTION Bilateral 11/14/2018    Procedure: Lumbar Medial Branch Blocks;  Surgeon: Eze Byrd Jr., MD;  Location: [facility] ENDO;  Service: Pain Management;  Laterality: Bilateral;  Bilateral Lumbar Medial Branch Blocks L2, L3, L4, L5    [id number]  [id number]    Arrive @ 1150; NO Sedation    EPIDURAL STEROID INJECTION Bilateral 11/28/2018    Procedure: Injection, Steroid, Epidural;  Surgeon: Eze Byrd Jr., MD;  Location: [facility] ENDO;  Service: Pain Management;  Laterality: Bilateral;  Bilateral Sacroiliac Joint Steroid Injections    [id number]    Arrive @ 0910    EPIDURAL STEROID INJECTION Bilateral 3/20/2019    Procedure: Injection, Steroid, Sacroiliiac Joint;  Surgeon: Eze Byrd Jr., MD;  Location: [facility] ENDO;  Service: Pain Management;  Laterality: Bilateral;  Bilateral Sacroiliac Joint Steroid Injections    [id number]    Arrive @ 1145; Trigger point injections also?    EPIDURAL STEROID INJECTION Right 8/2/2023    Procedure: Right L5 Transforaminal Epidural Steroid Injection;  Surgeon: Eze Byrd Jr., MD;  Location: [facility] ENDO;  Service: Pain Management;  Laterality: Right;  @0830 (given)  Eliquis last 7/29  Plavix last 7/27  Check BG    EPIDURAL STEROID INJECTION Right 10/11/2023    Procedure: Right L3 (infraneural) + S1 Transforaminal Epidural Steroid Injections;  Surgeon: Eze Byrd Jr., MD;  Location: [facility] ENDO;  Service: Pain Management;  Laterality: Right;  @0745 (requests morning)  Eliquis 10/7 & Plavix 10/5  Check BG    ESOPHAGOGASTRODUODENOSCOPY N/A 12/6/2023    Procedure: EGD (ESOPHAGOGASTRODUODENOSCOPY);   Surgeon: Anita Oswald MD;  Location: [facility] ENDO;  Service: Endoscopy;  Laterality: N/A;  Procedure Timin-4 weeks  Provider: Any GI provider  Location: No Preference  Additional Scheduling Information: Blood thinners  Prep Specifications:N/A   ref. by Dr. Light, instr. to portal, , [facility] meds-st  ok to hold Plavix 5 days and Eliquis 2 day    INTRAOCULAR PROSTHESES INSERTION Left 2022    Procedure: INSERTION, IOL PROSTHESIS;  Surgeon: Nickolas Hong MD;  Location: [facility] OR;  Service: Ophthalmology;  Laterality: Left;    LAMINOFORAMINOTOMY OF SPINE USING MINIMALLY INVASIVE TECHNIQUE Right 2025    Procedure: LAMINOFORAMINOTOMY, SPINE, MINIMALLY INVASIVE;  Surgeon: Luis M Ellis MD;  Location: [facility] OR;  Service: Neurosurgery;  Laterality: Right;  Right L4/5 minimally invasive laminotomy/microdiscectomy. silvana table, lucio frame, fluoro, microscope, no vendor, no monitoring  CLEARED BY CARDS AND PCP   RN PREOP 24---T/S--done -----NEED ORDERS    PHACOEMULSIFICATION OF CATARACT Left 2022    Procedure: PHACOEMULSIFICATION, CATARACT;  Surgeon: Nickolas Hong MD;  Location: [facility] OR;  Service: Ophthalmology;  Laterality: Left;  RN Phone Pre Op 22.  Covid NEGATIVE  22.  Arrival 08:00 am.    TONSILLECTOMY        Current Facility-Administered Medications on File Prior to Encounter   Medication Dose Route Frequency Provider Last Rate Last Admin    cyclopentolate 1% ophthalmic solution 1 drop  1 drop Left Eye On Call Procedure Nickolas Hong MD   1 drop at 22 0901    ofloxacin 0.3 % ophthalmic solution 1 drop  1 drop Left Eye On Call Procedure Nickolas Hong MD   2 drop at 22 1017    sodium chloride 0.9% flush 10 mL  10 mL Intravenous PRN Nickolas Hong MD        triamcinolone acetonide injection 40 mg  40 mg Intra-articular  Valerie Olivera MD   40 mg at 23 1030     Current Outpatient Medications on File Prior  to Encounter   Medication Sig Dispense Refill    HYDROcodone-acetaminophen (NORCO)  mg per tablet Take 1 tablet by mouth every 4 (four) hours as needed for Pain (7-10/10 pain). 42 tablet 0    acetaminophen (TYLENOL) 650 MG TbSR Take 650 mg by mouth every 8 (eight) hours.      albuterol (PROVENTIL/VENTOLIN HFA) 90 mcg/actuation inhaler Inhale 2 puffs into the lungs every 4 (four) hours as needed for Shortness of Breath. Rescue 17 g 3    apixaban (ELIQUIS) 5 mg Tab Take 1 tablet (5 mg total) by mouth 2 (two) times daily.      ascorbic acid, vitamin C, (VITAMIN C) 100 MG tablet Take 100 mg by mouth daily as needed.      atorvastatin (LIPITOR) 80 MG tablet TAKE 1 TABLET EVERY EVENING 90 tablet 3    b complex vitamins tablet Take 1 tablet by mouth once daily.      blood-glucose meter kit Test glucose 2-3x/day. True metrix 1 each 0    clopidogreL (PLAVIX) 75 mg tablet Take 75 mg by mouth once daily.      coenzyme Q10 100 mg capsule Take 1 capsule (100 mg total) by mouth once daily.      dapagliflozin propanediol (FARXIGA) 5 mg Tab tablet Take 1 tablet (5 mg total) by mouth once daily. 90 tablet 3    ergocalciferol (ERGOCALCIFEROL) 50,000 unit Cap TAKE 1 CAPSULE BY MOUTH WEEKLY 12 capsule 0    fenofibrate 160 MG Tab TAKE 1 TABLET EVERY MORNING 90 tablet 1    fluticasone propionate (FLONASE) 50 mcg/actuation nasal spray 2 sprays (100 mcg total) by Each Nostril route 2 (two) times daily. 18.2 mL 3    furosemide (LASIX) 20 MG tablet TAKE 1 TABLET TWICE DAILY 180 tablet 3    gabapentin (NEURONTIN) 300 MG capsule Take 2 capsules (600 mg total) by mouth 2 (two) times daily.      glimepiride (AMARYL) 1 MG tablet TAKE 1 TABLET BEFORE BREAKFAST 90 tablet 3    hydrALAZINE (APRESOLINE) 50 MG tablet TAKE 1 TABLET TWICE DAILY 180 tablet 3    insulin degludec (TRESIBA FLEXTOUCH U-100) 100 unit/mL (3 mL) insulin pen Inject 20 Units into the skin once daily. 30 mL 4    isosorbide mononitrate (IMDUR) 120 MG 24 hr tablet Take 1 tablet  "(120 mg total) by mouth once daily. 90 tablet 3    lancets Misc Check blood glucose 2x/day. True metrix. E11.65 200 each 3    linaCLOtide (LINZESS) 145 mcg Cap capsule Take 1 capsule (145 mcg total) by mouth before breakfast. 30 capsule 0    meclizine (ANTIVERT) 25 mg tablet Take 1 tablet (25 mg total) by mouth 3 (three) times daily as needed for Dizziness (or at least one HS for 1 week when vertigo active). 60 tablet 12    metFORMIN (GLUCOPHAGE-XR) 500 MG ER 24hr tablet Take 1 tablet with breakfast and 2 tablets with supper. 270 tablet 12    metFORMIN (GLUCOPHAGE-XR) 500 MG ER 24hr tablet Take 1 tablet with breakfast and 2 tablets with supper. 270 tablet 2    methocarbamoL (ROBAXIN) 750 MG Tab Take 1 tablet (750 mg total) by mouth 3 (three) times daily as needed (muscle spasms). 60 tablet 0    nitroGLYCERIN (NITROSTAT) 0.4 MG SL tablet Place 1 tablet (0.4 mg total) under the tongue every 5 (five) minutes as needed for Chest pain. 25 tablet 11    omega-3 acid ethyl esters (LOVAZA) 1 gram capsule Take 2 capsules (2 g total) by mouth 2 (two) times daily.      ondansetron (ZOFRAN-ODT) 4 MG TbDL Dissolvxe 1 tablet (4 mg total) by mouth every 6 (six) hours as needed (nausea). 15 tablet 0    pantoprazole (PROTONIX) 40 MG tablet TAKE 1 TABLET EVERY DAY 90 tablet 3    pen needle, diabetic 32 gauge x 1/4" Ndle Use daily 200 each 3    semaglutide (OZEMPIC) 2 mg/dose (8 mg/3 mL) PnIj Inject 2 mg into the skin every 7 days. 4 each 3    triazolam (HALCION) 0.25 MG Tab TAKE 1 TABLET BY MOUTH EVERY NIGHT AT BEDTIME AS NEEDED 90 tablet 5      Allergies: Penicillins and Shrimp  Family History   Problem Relation Name Age of Onset    Depression Mother      Heart disease Mother      Stroke Father      No Known Problems Sister Elaine     Diabetes Sister Dilcia     No Known Problems Sister Idalia     Blindness Brother Burt     No Known Problems Maternal Aunt      No Known Problems Maternal Uncle      No Known Problems Paternal Aunt   "    No Known Problems Paternal Uncle      No Known Problems Maternal Grandmother      No Known Problems Maternal Grandfather      No Known Problems Paternal Grandmother      No Known Problems Paternal Grandfather      Amblyopia Neg Hx      Cancer Neg Hx      Cataracts Neg Hx      Glaucoma Neg Hx      Hypertension Neg Hx      Macular degeneration Neg Hx      Retinal detachment Neg Hx      Strabismus Neg Hx      Thyroid disease Neg Hx       Social History     Tobacco Use    Smoking status: Never     Passive exposure: Never    Smokeless tobacco: Never   Substance Use Topics    Alcohol use: Not Currently    Drug use: No     Review of Systems   Constitutional:  Positive for fatigue.   HENT: Negative.     Eyes: Negative.    Respiratory: Negative.     Cardiovascular: Negative.    Gastrointestinal: Negative.    Endocrine: Negative.    Genitourinary: Negative.    Musculoskeletal: Negative.    Skin: Negative.    Allergic/Immunologic: Negative.    Neurological:         LUE numbness and weakness   Hematological: Negative.    Psychiatric/Behavioral: Negative.       Objective:     Vitals:    Temp: 98.7 °F (37.1 °C)  Pulse: 79  Rhythm: atrial rhythm  BP: (!) 143/67  MAP (mmHg): 96  Resp: (!) 23  SpO2: 98 %    Temp  Min: 97.9 °F (36.6 °C)  Max: 98.9 °F (37.2 °C)  Pulse  Min: 60  Max: 81  BP  Min: 143/67  Max: 244/109  MAP (mmHg)  Min: 96  Max: 207  Resp  Min: 17  Max: 31  SpO2  Min: 96 %  Max: 100 %    01/10 0701 - 01/11 0700  In: -   Out: 250 [Urine:250]            Physical Exam  Vitals and nursing note reviewed.   Constitutional:       General: He is not in acute distress.     Appearance: He is obese.   HENT:      Head: Normocephalic and atraumatic.      Nose: Nose normal.      Mouth/Throat:      Mouth: Mucous membranes are moist.      Pharynx: Oropharynx is clear.   Eyes:      Pupils: Pupils are equal, round, and reactive to light.   Cardiovascular:      Rate and Rhythm: Normal rate. Rhythm irregular.      Pulses: Normal pulses.    Pulmonary:      Effort: Pulmonary effort is normal.   Abdominal:      General: There is no distension.      Palpations: Abdomen is soft.      Tenderness: There is no abdominal tenderness.   Musculoskeletal:         General: No swelling.      Cervical back: Normal range of motion.   Skin:     General: Skin is warm and dry.      Capillary Refill: Capillary refill takes less than 2 seconds.   Neurological:      Mental Status: He is alert.      Comments:   E4 V5 M6  Alert. Oriented x4. Minimal dysarthria. No aphasia. Following commands.   PERRLA. EOMI. Visual fields intact   No facial asymmetry.  Tongue midline. Shoulder shrug symmetric.  Motor:  RUE 5/5  RLE 5/5  LUE 4/5  LLE 5/5  No drift   Coordination intact, slightly delayed movements  Sensation intact on R side. LUE numbness, could not tell I was touching his L arm with eyes closed.   Psychiatric:         Mood and Affect: Mood normal.         Behavior: Behavior normal.         Thought Content: Thought content normal.         Judgment: Judgment normal.       Gait deferred.       Today I personally reviewed pertinent medications, lines/drains/airways, imaging, cardiology results, laboratory results, microbiology results,:    Assessment/Plan:     Neuro  * CVA (cerebral vascular accident)  75 y/o M PMH HTN, HLD, Afib on Eliquis (last received on 1/11 AM), CAD s/p CABG, CKD3, insulin dependent diabetes. Admitted to [facility] overnight 1/9 to 1/10 following a syncopal episode w/ fall and encephalopathy. Seen by tele-neurology. Recommended MRI (demonstrated bilateral MCA/PCA watershed infarcts), MRA (demonstrated diminished flow in the distal BL MCA), EEG (posterior slowing), and CTA head/neck (unrevealing on 1/10). Patient exhibited an acute change this morning around 10AM, described as left hemiplegia and dysarthria. Stroke code called, and repeat CTA demonstrated R M2 occlusion. Patient was transferred to [facility] for higher level of care. NIHSS on arrival of 8  (left-sided inattention/neglect, dysarthria, LUE drift). Taken for DSA which showed recanalization of R M2, no intervention performed.     Admit to [facility]  Vascular Neurology and Neuro IR consulted   Q1 neuro checks, vitals, I/Os  EKG and CXR  ECHO completed at OSH  A1c, TSH, lipid panel, aPTT, PT/INR, T&S pending  CBC, CMP, Mag, and phos daily  SBP goal < 180  Cardene gtt  PRN labetalol and hydralazine  Statin   Restart Eliquis  Regular diet  PT/OT/SLP  VTE prophylaxis: mechanical, Eliquis    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  See CVA problem    Cardiac/Vascular  Pulmonary artery hypertension  Noticed on ECHO at OSH  PASP 93  Given high contrast load the last today with chronically impaired kidney function and no clinical symptoms of PE currently, PE protocol scheduled for tomorrow AM      Longstanding persistent atrial fibrillation  History of  On Eliquis  EKG pending, appears to be in rate controlled Afib on tele    Hx of CABG  History of  Resume Plavix    Essential hypertension  History of  Takes Hydralazine 50 BID, holding for now  SBP <180  Cardene gtt  PRN Hydralazine and Labetalol    Mixed hyperlipidemia  History of  Lipid level pending  Continue statin and fenofibrate    Endocrine  Type 2 diabetes mellitus with peripheral neuropathy  History of  A1C pending  Holding home meds  SSI PRN  Q6 BGs    Other  Impaired mobility and ADLs  PT/OT ordered          The patient is being Prophylaxed for:  Venous Thromboembolism with: Chemical  Stress Ulcer with: H2B  Ventilator Pneumonia with: not applicable    Activity Orders            Progressive Mobility Protocol (mobilize patient to their highest level of functioning at least twice daily) starting at 01/11 2000    Diet Adult Regular Standard Tray: Regular starting at 01/11 1745    Turn patient starting at 01/11 1600    Elevate HOB starting at 01/11 1417          Full Code    Critical care time spent on the evaluation and treatment of severe organ  dysfunction, review of pertinent labs and imaging studies, discussions with consulting providers and discussions with patient/family: 45 minutes.      JUAN Solis-BC  Neurocritical Care  [facility] - Neuro Critical Care

## 2025-01-12 NOTE — PT/OT/SLP EVAL
"Occupational Therapy  Co- Evaluation    Name: Driss Hernandez Jr.  MRN: [id number]  Admitting Diagnosis: Arterial ischemic stroke, multifocal, multiple vascular territories, acute  Recent Surgery: Procedure(s) (LRB):  ANGIOGRAM-CEREBRAL (N/A)      Recommendations:     Discharge Recommendations: High Intensity Therapy  Discharge Equipment Recommendations:  walker, rolling, bath bench  Barriers to discharge:  None    Assessment:     Driss Hernandez Jr. is a 74 y.o. male with a medical diagnosis of Arterial ischemic stroke, multifocal, multiple vascular territories, acute after recent spinal surgery. He presents with good strength, although impaired visual tracking with L inattention. Notable difficulty sequencing tasks requiring consistent step by step cueing for toothbrushing while in stance at sink. Performance deficits affecting function: weakness, impaired endurance, impaired self care skills, impaired functional mobility, gait instability, decreased lower extremity function, decreased upper extremity function, decreased safety awareness, impaired balance, impaired fine motor, impaired cognition. Patient is significantly below active, independent PLOF.  Patient presents with good participation and motivation to return to prior level of function with high intensity therapy.  The patient demonstrates appropriate endurance and strength to participate in up to 3 hours or 15hrs of combined therapy post acute.     Rehab Prognosis: Good; patient would benefit from acute skilled OT services to address these deficits and reach maximum level of function.       Plan:     Patient to be seen 4 x/week to address the above listed problems via self-care/home management, therapeutic activities, therapeutic exercises, neuromuscular re-education  Plan of Care Expires: 02/12/25  Plan of Care Reviewed with: patient, family    Subjective     Chief Complaint: "I'm not very good with instructions"  Patient/Family Comments/goals: " return to active PLOF    Occupational Profile:  Living Environment: lives with their spouse in a single story home with 1 steps to enter. Bathroom set up: walk in shower with  no AD  Previous level of function: Patient reports they were independent with ADLs and IADLs prior to hospitalization.   Roles and Routines: active   Equipment Used at Home: none  Assistance upon Discharge: family    Pain/Comfort:  Pain Rating 1: 5/10  Location - Orientation 1: generalized  Location 1: back  Pain Addressed 1: Reposition, Distraction  Pain Rating Post-Intervention 1: other (see comments)    Patients cultural, spiritual, Buddhism conflicts given the current situation: no    Objective:   Co-evaluation and treatment necessary due to patient's medical complexity requiring two skilled therapists for patient safety, activity tolerance, and appropriate progression towards functional goals.      Communicated with: nursing prior to session.  Patient found HOB elevated with telemetry, blood pressure cuff, Condom Catheter, pulse ox (continuous) upon OT entry to room.    General Precautions: Standard, fall  Orthopedic Precautions: spinal precautions  Braces: N/A  Respiratory Status: Room air    Occupational Performance:    Bed Mobility:    Patient completed Rolling/Turning to Left with  maximal assistance  Patient completed Supine to Sit with maximal assistance  Patient sat EOB for ~ 10 minutes with Stand-by Assistance      Functional Mobility/Transfers:  Patient completed Sit <> Stand Transfer with contact guard assistance  with  rolling walker x1 rep and CGA with HHA x1 rep  Functional Mobility: patient ambulated few steps forward/back with HHA and min A; patient ambulated to/from bathroom with min A and RW with patient hitting objects x3 requiring physical assistance to navigate around obstacle    Activities of Daily Living:  Grooming: moderate assistance to brush teeth in stance; good standing balance but requiring >10 cues to  "sequence task and difficulty with [facility]  Upper Body Dressing: contact guard assistance to don gown like jacket  Lower Body Dressing: maximal assistance to don socks    Cognitive/Visual Perceptual:  Cognitive/Psychosocial Skills:     -       Oriented to: Person, Place, and Situation   -       Follows Commands/attention:Follows one-step commands  -       Communication: clear/fluent  -       Memory: Impaired STM  -       Safety awareness/insight to disability: impaired   -       Mood/Affect/Coping skills/emotional control: Appropriate to situation  Visual/Perceptual:      -Impaired  unable to maintain attention to task with visual tracking, notable functional L inattention      Physical Exam:  Sensation:    -       Intact  Dominant hand:    -       R  Upper Extremity Range of Motion:     -       Right Upper Extremity: WFL  -       Left Upper Extremity: WFL  Upper Extremity Strength:    -       Right Upper Extremity: WFL  -       Left Upper Extremity: gross 4+/5   Strength:    -       Right Upper Extremity: WFL  -       Left Upper Extremity: WFL    AMPAC 6 Click ADL:  [facility] Total Score: 13    Brief Interview of Mental Status (BIMS):   Repetition of 3 words: "Sock, Blue, Bed": 3/3  3- Three  Temporal Orientation: 0/6         Able to report correct year: 0 - Missed by > 5 years 2009  Able to report correct month: 0- Missed by > 1 month January  Able to report correct day of the week: 0- Incorrect or no answer Monday  Recall: 1/6  Able to recall "Sock": 0-  No, could not recall   Able to recall "Blue": 1- Yes, after cueing "a color"  Able to recall "Bed": 0- No, could not recall  Summary score: 4/15  13-15: Cognitively Intact  8-12: Moderately Impaired   0-7: Severe Impairment     Treatment & Education:    Patient educated on:   -spinal precautions (no bending, lifting, and twisting) and its impact on ADL participation  -purpose of OT and OT POC  -facilitation and education on proper body mechanics, energy conservation, " and safety  -importance of early mobility and out of bed activities with staff assist  -overall benefits of therapy     All questions answered within OT scope and to patient's satisfaction    Patient left up in chair with all lines intact, call button in reach, chair alarm on, nursing notified, and family present    GOALS:   Multidisciplinary Problems       Occupational Therapy Goals          Problem: Occupational Therapy    Goal Priority Disciplines Outcome Interventions   Occupational Therapy Goal     OT, PT/OT Progressing    Description: Goals to be met by: 2/12/2025     Patient will increase functional independence with ADLs by performing:    UE Dressing with Supervision.  LE Dressing with Supervsion.  Grooming while standing at sink with Supervision.  Toileting from toilet with Supervision for hygiene and clothing management.   Step transfer with Supervision  Toilet transfer to toilet with Supervision.  100% reciprocation and integration of 3/3 spinal precautions, DME recommendations, and ADL/task modifications post-sx to support safe d/c at highest level of function.      DME Justifications (see above for complete DME recommendations)    Rolling Walker- Patient demonstrates a mobility limitation that significantly impairs their ability to participate in one or more mobility related activities of daily living. Patient's mobility limitation cannot be sufficiently resolved with the use of a cane, but can be sufficiently resolved with the use of a rolling walker.The use of a rolling walker will considerably improve their ability to participate in MRADLs. Patient will use the walker on a regular basis at home.      Tub transfer bench - Patient demonstrates a mobility limitation that impairs their ability to participate in bathing within shower tub combination safely. Patient's limitation cannot be sufficiently resolved without the use of a tub transfer bench. The use of the tub transfer bench will considerably  improve their ability to safely participate in bathing / shower transfers. Patient will use the tub transfer bench on a regular basis at home.                         History:     Past Medical History:   Diagnosis Date    Chronic heart failure with preserved ejection fraction 2/9/2023    Chronic midline low back pain without sciatica 10/2/2017    Colon polyps     Coronary artery disease involving native coronary artery of native heart 3/5/2020    Coronary artery disease involving native coronary artery of native heart with angina pectoris 12/10/2020    CVA (cerebral vascular accident) 1/11/2025    Diabetes mellitus with neurological manifestations, uncontrolled 1/24/2017    Diabetic polyneuropathy associated with type 2 diabetes mellitus 1/24/2017    Diabetic polyneuropathy associated with type 2 diabetes mellitus     Encephalopathy 1/10/2025    Essential hypertension 1/24/2017    Gastroesophageal reflux disease 1/24/2017    Hyperlipidemia 1/24/2017    Insomnia 1/24/2017    Long-term insulin use 1/24/2017    Longstanding persistent atrial fibrillation 12/30/2020    Lumbar spondylosis 11/13/2017    Nuclear sclerosis of both eyes 8/24/2017    Obesity     PAD (peripheral artery disease) 3/23/2022    Stage 3 chronic kidney disease 2/13/2019    Uncontrolled type 2 diabetes mellitus without complication, with long-term current use of insulin 1/24/2017         Past Surgical History:   Procedure Laterality Date    ARTHROSCOPIC REPAIR OF ROTATOR CUFF OF SHOULDER Right 7/5/2022    Procedure: REPAIR, ROTATOR CUFF, ARTHROSCOPIC;  Surgeon: Valerie Olivera MD;  Location: [facility];  Service: Orthopedics;  Laterality: Right;  HETAL LEMUS [phone]/ TEXTED ALLAN ON 6/23/2022 @ 9:47AM. ALLAN RESPONDED ON 6/23/2022 @ 10:33AM-BREANNA BABIN [phone] MY BE HERE FOR CASE SPOKE TO HIM @ 9:59AM ON 7-1-2022  RN PREOP 6/28/2022    BACK SURGERY      2002    CATARACT EXTRACTION W/  INTRAOCULAR LENS IMPLANT Right  08/29/2017    Dr. Hong    CATARACT EXTRACTION W/  INTRAOCULAR LENS IMPLANT Left 02/24/2022    Dr. Hong    CAUDAL EPIDURAL STEROID INJECTION N/A 9/25/2024    Procedure: Caudal Epidural Steroid Injection;  Surgeon: Eze Byrd Jr., MD;  Location: [facility] PAIN MANAGEMENT;  Service: Pain Management;  Laterality: N/A;  @1100(prev. 715)  Eliquis last 9/21  Plavix last 9/19  Check BG  E-Consent    COLONOSCOPY N/A 2/21/2017    Procedure: COLONOSCOPY;  Surgeon: Robb Rosado MD;  Location: [facility] ENDO;  Service: Endoscopy;  Laterality: N/A;    COLONOSCOPY N/A 7/5/2023    Procedure: COLONOSCOPY;  Surgeon: Aston Varela MD;  Location: [facility] ENDO;  Service: Endoscopy;  Laterality: N/A;  Referral: JOVANNI SCANLON to hold Plavix 5 days and Eliquis 2 days per Dr Ford   Meds  Suprep   Inst portal   LW    CORONARY ANGIOGRAPHY INCLUDING BYPASS GRAFTS WITH CATHETERIZATION OF LEFT HEART N/A 11/11/2020    Procedure: ANGIOGRAM, CORONARY, INCLUDING BYPASS GRAFT, WITH LEFT HEART CATHETERIZATION;  Surgeon: Lane Cuellar MD;  Location: [facility] CATH LAB;  Service: Cardiology;  Laterality: N/A;  RN PRE OP 11-5-2020. --COVID NEGATIVE ON  11-. C A    CORONARY ARTERY BYPASS GRAFT      March 2016    EPIDURAL STEROID INJECTION Bilateral 11/14/2018    Procedure: Lumbar Medial Branch Blocks;  Surgeon: Eze Byrd Jr., MD;  Location: [facility] ENDO;  Service: Pain Management;  Laterality: Bilateral;  Bilateral Lumbar Medial Branch Blocks L2, L3, L4, L5    11374  51259    Arrive @ 1150; NO Sedation    EPIDURAL STEROID INJECTION Bilateral 11/28/2018    Procedure: Injection, Steroid, Epidural;  Surgeon: Eze Byrd Jr., MD;  Location: [facility] ENDO;  Service: Pain Management;  Laterality: Bilateral;  Bilateral Sacroiliac Joint Steroid Injections    07028    Arrive @ 0910    EPIDURAL STEROID INJECTION Bilateral 3/20/2019    Procedure: Injection, Steroid, Sacroiliiac Joint;  Surgeon: Eze Byrd  MD Naomie;  Location: [facility] ENDO;  Service: Pain Management;  Laterality: Bilateral;  Bilateral Sacroiliac Joint Steroid Injections    [id number]    Arrive @ 1145; Trigger point injections also?    EPIDURAL STEROID INJECTION Right 2023    Procedure: Right L5 Transforaminal Epidural Steroid Injection;  Surgeon: Eze Byrd Jr., MD;  Location: [facility] ENDO;  Service: Pain Management;  Laterality: Right;  @0830 (given)  Eliquis last   Plavix last   Check BG    EPIDURAL STEROID INJECTION Right 10/11/2023    Procedure: Right L3 (infraneural) + S1 Transforaminal Epidural Steroid Injections;  Surgeon: Eze Byrd Jr., MD;  Location: [facility] ENDO;  Service: Pain Management;  Laterality: Right;  @0745 (requests morning)  Eliquis 10/7 & Plavix 10/5  Check BG    ESOPHAGOGASTRODUODENOSCOPY N/A 2023    Procedure: EGD (ESOPHAGOGASTRODUODENOSCOPY);  Surgeon: Anita Oswald MD;  Location: [facility] ENDO;  Service: Endoscopy;  Laterality: N/A;  Procedure Timin-4 weeks  Provider: Any GI provider  Location: No Preference  Additional Scheduling Information: Blood thinners  Prep Specifications:N/A   ref. by Dr. Light, instr. to portal, ,  meds-st  ok to hold Plavix 5 days and Eliquis 2 day    INTRAOCULAR PROSTHESES INSERTION Left 2022    Procedure: INSERTION, IOL PROSTHESIS;  Surgeon: Nickolas Hong MD;  Location: [facility] OR;  Service: Ophthalmology;  Laterality: Left;    LAMINOFORAMINOTOMY OF SPINE USING MINIMALLY INVASIVE TECHNIQUE Right 2025    Procedure: LAMINOFORAMINOTOMY, SPINE, MINIMALLY INVASIVE;  Surgeon: Luis M Ellis MD;  Location: [facility] OR;  Service: Neurosurgery;  Laterality: Right;  Right L4/5 minimally invasive laminotomy/microdiscectomy. silvana crisostomo, lucio frame, fluoro, microscope, no vendor, no monitoring  CLEARED BY CARDS AND PCP   RN PREOP 24---T/S--done -----NEED ORDERS    PHACOEMULSIFICATION OF CATARACT Left 2022    Procedure:  PHACOEMULSIFICATION, CATARACT;  Surgeon: Nickolas Hong MD;  Location: [facility];  Service: Ophthalmology;  Laterality: Left;  RN Phone Pre Op 2-16-22.  Covid NEGATIVE  2-23-22.  Arrival 08:00 am.    TONSILLECTOMY         Time Tracking:     OT Date of Treatment: 01/12/25  OT Start Time: 0909  OT Stop Time: 0943  OT Total Time (min): 34 min    Billable Minutes:Evaluation 10  Self Care/Home Management 14  Therapeutic Activity 10    1/12/2025

## 2025-01-12 NOTE — HOSPITAL COURSE
1/12/25: taken for endovascular thrombectomy, RM2 recanalized spontaneously, examination improved today with less dense left-side neglect though is still present, Eliquis and Plavix both restarted, stable for step-down to NPU  1/13/25: NAEON. HDS, afebrile, sating well on room air. Repeat MRI revealing progression of damage due to stroke, although clinically pt is improving.   1/14/25: NAEON. HDS, afebrile, sating well on room air. Stable clinical exam. Stepped down today.   1/15/25: Patient to discharge to rehab today. Will continue with eliquis, plavix, and high intensity statin. Plan for follow-up with vascular neuro in 1 month.

## 2025-01-12 NOTE — ASSESSMENT & PLAN NOTE
75 y/o M PMH HTN, HLD, Afib on Eliquis (last received on 1/11 AM), CAD s/p CABG, CKD3, insulin dependent diabetes. Admitted to [facility] overnight 1/9 to 1/10 following a syncopal episode w/ fall and encephalopathy. Seen by tele-neurology. Recommended MRI (demonstrated bilateral MCA/PCA watershed infarcts), MRA (demonstrated diminished flow in the distal BL MCA), EEG (posterior slowing), and CTA head/neck (unrevealing on 1/10). Patient exhibited an acute change this morning around 10AM, described as left hemiplegia and dysarthria. Stroke code called, and repeat CTA demonstrated R M2 occlusion. [place] [facility] [place]. NIHSS on arrival of 8 (left-sided inattention/neglect, dysarthria, LUE drift). Taken for DSA which showed recanalization of R M2, no intervention performed.     Admit to [facility]  Vascular Neurology and Neuro IR consulted   Q1 neuro checks, vitals, I/Os  EKG and CXR  ECHO completed at OSH  ECHO at OSH showed PASP 93 without right heart strain  Repeat ECHO pending for today  If PASP remains elevated, will consider PE protocol  A1c, TSH, lipid panel, aPTT, PT/INR, T&S  CBC, CMP, Mag, and phos daily  SBP goal < 180  PRN labetalol and hydralazine  Statin   Restart Eliquis  Regular diet  PT/OT/SLP  VTE prophylaxis: mechanical, Eliquis  Will consider SD to stroke today pending repeat ECHO

## 2025-01-12 NOTE — PROGRESS NOTES
[facility] - Neuro Critical Care  Neurocritical Care  Progress Note    Admit Date: 1/9/2025  Service Date: 01/12/2025  Length of Stay: 2    Subjective:     Chief Complaint: Arterial ischemic stroke, multifocal, multiple vascular territories, acute    History of Present Illness: 75 y/o M PMH HTN, HLD, Afib on Eliquis (last received on 1/11 AM), CAD s/p CABG, CKD3, insulin dependent diabetes. He had a MIS Right L4-5 laminoforaminotomy 1/6 and was off of his AC/AP during that time. At home, after the procedure, he noticed R hand numbness/weakness and did not seek treatment. He was then admitted to [facility] overnight 1/9 following a syncopal episode w/ fall and encephalopathy. At the OSH, MRI demonstrated bilateral MCA/PCA infarcts, MRA showed diminished flow in the distal BL MCA, EEG showed posterior slowing, and CTA head/neck. Patient exhibited an acute change this morning around 10AM, described as left hemiplegia and dysarthria. Stroke code called, and repeat CTA demonstrated R M2 occlusion. Patient was transferred to [facility] [place] for higher level of care. NIHSS on arrival of 8 (left-sided inattention/neglect, dysarthria, LUE drift). Taken for DSA which showed recanalization of R M2, no intervention performed. Admitted to [facility] for post-procedure monitoring and higher level of care.    Hospital Course: No notes on file    No new subjective & objective note has been filed under this hospital service since the last note was generated.    Assessment/Plan:     Neuro  * Arterial ischemic stroke, multifocal, multiple vascular territories, acute  75 y/o M PMH HTN, HLD, Afib on Eliquis (last received on 1/11 AM), CAD s/p CABG, CKD3, insulin dependent diabetes. Admitted to [facility] overnight 1/9 to 1/10 following a syncopal episode w/ fall and encephalopathy. Seen by tele-neurology. Recommended MRI (demonstrated bilateral MCA/PCA watershed infarcts), MRA (demonstrated diminished flow in the distal BL MCA), EEG (posterior  slowing), and CTA head/neck (unrevealing on 1/10). Patient exhibited an acute change this morning around 10AM, described as left hemiplegia and dysarthria. Stroke code called, and repeat CTA demonstrated R M2 occlusion. Patient was transferred to [facility] for higher level of care. NIHSS on arrival of 8 (left-sided inattention/neglect, dysarthria, LUE drift). Taken for DSA which showed recanalization of R M2, no intervention performed.     Admit to [facility]  Vascular Neurology and Neuro IR consulted   Q1 neuro checks, vitals, I/Os  EKG and CXR  ECHO completed at OSH  ECHO at OSH showed PASP 93 without right heart strain  Repeat ECHO pending for today  If PASP remains elevated, will consider PE protocol  A1c, TSH, lipid panel, aPTT, PT/INR, T&S  CBC, CMP, Mag, and phos daily  SBP goal < 180  PRN labetalol and hydralazine  Statin   Restart Eliquis  Regular diet  PT/OT/SLP  VTE prophylaxis: mechanical, Eliquis  Will consider SD to stroke today pending repeat ECHO    Cardiac/Vascular  Longstanding persistent atrial fibrillation  History of  On Eliquis  Rate controlled    Essential hypertension  History of  Resume home Hydralazine 50 BID  SBP <180  PRN Hydralazine and Labetalol    Mixed hyperlipidemia  History of  Lipid level on admission  Continue statin and fenofibrate    Endocrine  Type 2 diabetes mellitus with peripheral neuropathy  History of  A1C 6.1  Holding home meds  SSI PRN  Q6 BGs          The patient is being Prophylaxed for:  Venous Thromboembolism with: Mechanical or Chemical  Stress Ulcer with: Not Applicable   Ventilator Pneumonia with: not applicable    Activity Orders            Straight Cath starting at 01/12 0604    Progressive Mobility Protocol (mobilize patient to their highest level of functioning at least twice daily) starting at 01/11 2000    Diet Adult Regular Standard Tray: Regular starting at 01/11 0145    Turn patient starting at 01/11 1600    Elevate HOB starting at 01/11 1417          Full  Code    Critical care time spent on the evaluation and treatment of severe organ dysfunction, review of pertinent labs and imaging studies, discussions with consulting providers and discussions with patient/family: 45 minutes.      Delbert Zamora [facility]  Neurocritical Care  [facility] - Neuro Critical Care

## 2025-01-12 NOTE — ASSESSMENT & PLAN NOTE
74M. Hx HTN, HLD, Afib on Eliuquis, CAD s/p CABG, CKD3, insulin dependent diabetes. Admitted to [facility] overnight 1/9 to 1/10 following a syncopal episode w/fall, as well as encephalopathy. Seen by tele-neurology. Recommended MRI (demonstrated bilateral MCA/PCA watershed infarcts), MRA (demonstrated diminished flow in the distal BL MCA), EEG (posterior slowing), and CTA head/neck (unrevealing on 1/10). Patient exhibited an acute change this morning around 10AM, described as left hemiplegia and dysarthria. Stroke code called, and repeat CTA demonstrated R M2 occlusion. No TNK due to recent Eliquis (08:13 that AM, 20:40 the evening before). Patient transferred to [facility] [place] [place] for neuro-IR capacity. Taken for angiogram at [facility] [place] [place]. DSA demonstrated recanalization of R M2, no intervention performed. Admitted to [facility] for post-procedure monitoring. 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 Eliquis and Plavix restarted. Examination with improved left-sided neglect, though is still present. 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 Stable for stepdown to NPU.    Please repeat MRI brain w/o contrast to assess for stroke burden following right M2 occlusion.    Antithrombotics for secondary stroke prevention: Anticoagulants: Apixaban 5 mg BID , Antiplatelets: Plavix 75mg daily    Statins for secondary stroke prevention and hyperlipidemia, if present:   Statins: Atorvastatin- 80 mg daily    Aggressive risk factor modification: HTN, DM, HLD, CAD     Rehab efforts: The patient has been evaluated by a stroke team provider and the therapy needs have been fully considered based off the presenting complaints and exam findings. The following therapy evaluations are needed: PT evaluate and treat, OT evaluate and treat, SLP evaluate and treat, PM&R evaluate for appropriate placement    Diagnostics ordered/pending: MRI head without contrast to assess brain parenchyma    VTE  prophylaxis: Mechanical prophylaxis: Place SCDs    BP parameters: Infarct: Post unsucessful thrombectomy, SBP <220

## 2025-01-12 NOTE — PLAN OF CARE
"[facility] Care Plan  POC reviewed with Driss Hernandez Jr. and family at 1400. Patient and Family verbalized understanding. Questions and concerns addressed. No acute events today. Pt progressing toward goals. Will continue to monitor. See below and flowsheets for full assessment and VS info.     -MRI completed.   -Stepdown to floor. Pending room placement.         Is this a stroke patient? yes- Stroke booklet reviewed with patient and family, risk factors identified for patient and stroke booklet remains at bedside for ongoing education.     Care individualization: cluster care, communication     Neuro:  Derick Coma Scale  Best Eye Response: 4-->(E4) spontaneous  Best Motor Response: 6-->(M6) obeys commands  Best Verbal Response: 4-->(V4) confused  Derick Coma Scale Score: 14  Assessment Qualifiers: patient not sedated/intubated, no eye obstruction present  Pupil PERRLA: yes     24 hr Temp:  [97.7 °F (36.5 °C)-98.1 °F (36.7 °C)]     CV:   Rhythm: atrial rhythm  BP goals:   SBP < 180  MAP > 65    Resp:           Plan: N/A    GI/:     Diet/Nutrition Received: regular  Last Bowel Movement: 01/09/25  Voiding Characteristics: external catheter    Intake/Output Summary (Last 24 hours) at 1/12/2025 1706  Last data filed at 1/12/2025 1501  Gross per 24 hour   Intake 73.03 ml   Output 2375 ml   Net -2301.97 ml     Unmeasured Output  Urine Occurrence: 1  Pad Count: 1    Labs/Accuchecks:  Recent Labs   Lab 01/12/25  0028   WBC 7.50   RBC 4.95   HGB 13.2*   HCT 40.4         Recent Labs   Lab 01/12/25  0028      K 3.9   CO2 18*      BUN 17   CREATININE 0.9   ALKPHOS 30*   ALT 12   AST 22   BILITOT 1.1*      Recent Labs   Lab 01/11/25  1733   INR 1.2   APTT 29.5    No results for input(s): "CPK", "CPKMB", "TROPONINI", "MB" in the last 168 hours.    Electrolytes: N/A - electrolytes WDL  Accuchecks: Q6H    Gtts:      LDA/Wounds:    Garfield Risk Assessment  Sensory Perception: 4-->no impairment  Moisture: " 3-->occasionally moist  Activity: 1-->bedfast  Mobility: 3-->slightly limited  Nutrition: 2-->probably inadequate  Friction and Shear: 3-->no apparent problem  Garfield Score: 16    Is your garfield score 12 or less? no            Restraints:        [facility]

## 2025-01-12 NOTE — PT/OT/SLP EVAL
Physical Therapy Co-Evaluation and Co-Treatment with OT    Patient Name:  Driss Hernandez Jr.   MRN:  [id number]    Recent Surgery: Procedure(s) (LRB):  ANGIOGRAM-CEREBRAL (N/A)      Patient required co-tx with OT secondary to need for multiple set of skilled hands to provide safest therapy and best outcomes.      Recommendations:     Discharge Recommendations:   High intensity therapy  Discharge Equipment Recommendations: walker, rolling, bedside commode   Barriers to discharge: Increased level of assist    Highest Level of Mobility: Gait 18'  Assistance Required: Min(A) w/ RW    Assessment:     Driss Hernandez Jr. is a 74 y.o. male admitted with a medical diagnosis of Arterial ischemic stroke, multifocal, multiple vascular territories, acute. He presents with the following impairments/functional limitations:  weakness, gait instability, impaired balance, impaired endurance, impaired self care skills, impaired sensation, decreased safety awareness, decreased lower extremity function, impaired functional mobility, impaired cognition    Pt met with HOB elevated, family present and agreeable to PT session. Pt reports his PLOF is (I) with functional mobility and ADLs using no DME. Currently, pt requires min(A) to ambulate with RW due to L-sided inattention and motor apraxia. Pt with evident short-term memory deficits as he frequently forgets task at hand or asks repeated questions. Pt ambulated into obstacles x3 on his L side and requires max cues for problem solving obstacle navigation. He has poor insight to his deficits and is a high risk at this time.    Pt would benefit from continued skilled acute PT 4x/wk to address above listed functional deficits, provide patient/caregiver education, reduce fall risk, and maximize (I) and safety with functional mobility.     Rehab Prognosis: Good; patient would benefit from acute skilled PT services to address these deficits and reach maximum level of function.   "    Plan:     During this hospitalization, patient to be seen 4 x/week to address the identified rehab impairments via gait training, therapeutic activities, therapeutic exercises, neuromuscular re-education and progress toward the following goals:    Plan of Care Expires:  02/12/25    This plan of care has been discussed with the patient/caregiver, who was included in its development and is in agreement with the identified goals and treatment plan.     Subjective     Communicated with RN prior to session.  Patient agreeable to participate.     Chief Complaint: Stroke  Patient/Family Comments/goals: "This was a lot harder than I thought"    Pain/Comfort:  Pain Rating 1: 0/10  Location - Orientation 1: generalized  Location 1: back  Pain Addressed 1: Reposition, Distraction  Pain Rating Post-Intervention 1:  (unrated- pt reports gas pain throughout)    Patients cultural, spiritual, Evangelical conflicts given the current situation: no    Patient's living environment is as follows:  Living Environment: Pt lives with his spouse in [facility] with [address]. Bathroom set-up: walk-in shower  Prior Level of Function: independent with mobility and ADLs  DME used: none  DME owned (not currently used): none  Upon discharge, patient will have assistance from: Spouse    Objective:     Patient found HOB elevated with blood pressure cuff, telemetry, Condom Catheter, pulse ox (continuous)  upon PT entry to room.    General Precautions: Standard, fall   Orthopedic Precautions:spinal precautions   Braces: N/A   BP (!) 123/58   Pulse 80   Temp 97.7 °F (36.5 °C) (Oral)   Resp (!) 21   Ht 5' 7" (1.702 m)   Wt 80.6 kg (177 lb 11.1 oz)   SpO2 97%   BMI 27.83 kg/m²   Oxygen Device:  room air      Exams:    Cognition:  Patient is oriented to Person, Place, Situation  Follows one-step commands with frequent reminders  Insight to deficits/safety awareness: impaired    Edema: None present     Postural examination/scapula alignment: Rounded " shoulder    Lower Extremity Range of Motion:  Right Lower Extremity: WNL  Left Lower Extremity: WNL    Lower Extremity Strength    Right LE  Left LE    Hip Flexion: 4/5 Hip Flexion: 4/5   Knee Extension: 4+/5 Knee Extension: 4+/5   Knee Flexion: 4+/5 Knee Flexion: 4+/5   Ankle Dorsiflexion:  4+/5 Ankle Dorsiflexion: 4+/5   Ankle Plantarflexion: 4+/5 Ankle Plantarflexion: 4+/5        Sensation:   Light touch sensation: Intact BLEs    Functional Mobility:    Bed Mobility:  Supine to Sit: Maximum Assistance and 2 persons  on L side of bed  Via logroll  Rolling L: Maximum Assistance  Scooting anteriorly to EOB to plant feet on floor: Minimal Assistance    Transfers:   Sit to Stand Transfer: Contact Guard Assistance  from EOB with no AD   Increased time, pt reports dizziness  Bed to Chair: Minimal Assistance with RW              Gait:  Patient received gait training:   10 feet with Minimal Assistance and  no AD  18 feet with minimal assistance and RW  Gait Assessment: unsteady gait, narrow base of support, flexed posture, decreased ivania, and inconsistent left foot placement  Gait Pattern Observed: Step-to  Comments: All lines remained intact throughout ambulation trial, gait belt utilized. Pt with poor RW management and difficulty sequencing. He ambulated into obstacles on his L x3 and requires max verbal cues to back up and go around obstacle.    Balance:  Static Sit:   Stand-By Assist at EOB   Static Stand:   Min Assist with Rolling walker  Dynamic Stand:  Min Assist with Rolling walker        Therapeutic Activities/Exercises     Patient assisted with functional mobility as noted above  Therapist educated patient on spinal precautions: no bending, lifting, or twisting. Patient expressed understanding  Discussed at length benefits of PT as well as d/c recommendations. Pt agreeable  Patient educated on the importance of early mobility, OOB to prevent functional decline during hospital stay  Patient was instructed to  utilize staff assistance for mobility/transfers.  Patient is appropriate to transfer with min(A) and RN/PCT assist  Patient educated on PT POC and role of PT in acute care  White board updated to include patient's safest level of mobility with staff assistance, RN also updated    AM-PAC 6 CLICK MOBILITY  Turning over in bed (including adjusting bedclothes, sheets and blankets)?: 3  Sitting down on and standing up from a chair with arms (e.g., wheelchair, bedside commode, etc.): 3  Moving from lying on back to sitting on the side of the bed?: 2  Moving to and from a bed to a chair (including a wheelchair)?: 3  Need to walk in hospital room?: 3  Climbing 3-5 steps with a railing?: 2  Basic Mobility Total Score: 16      Patient left up in chair with all lines intact, call button in reach, chair alarm on, and rn notified.      History/Goals:     PAST MEDICAL HISTORY:  Past Medical History:   Diagnosis Date    Chronic heart failure with preserved ejection fraction 2/9/2023    Chronic midline low back pain without sciatica 10/2/2017    Colon polyps     Coronary artery disease involving native coronary artery of native heart 3/5/2020    Coronary artery disease involving native coronary artery of native heart with angina pectoris 12/10/2020    CVA (cerebral vascular accident) 1/11/2025    Diabetes mellitus with neurological manifestations, uncontrolled 1/24/2017    Diabetic polyneuropathy associated with type 2 diabetes mellitus 1/24/2017    Diabetic polyneuropathy associated with type 2 diabetes mellitus     Encephalopathy 1/10/2025    Essential hypertension 1/24/2017    Gastroesophageal reflux disease 1/24/2017    Hyperlipidemia 1/24/2017    Insomnia 1/24/2017    Long-term insulin use 1/24/2017    Longstanding persistent atrial fibrillation 12/30/2020    Lumbar spondylosis 11/13/2017    Nuclear sclerosis of both eyes 8/24/2017    Obesity     PAD (peripheral artery disease) 3/23/2022    Stage 3 chronic kidney disease  2/13/2019    Uncontrolled type 2 diabetes mellitus without complication, with long-term current use of insulin 1/24/2017       Past Surgical History:   Procedure Laterality Date    ARTHROSCOPIC REPAIR OF ROTATOR CUFF OF SHOULDER Right 7/5/2022    Procedure: REPAIR, ROTATOR CUFF, ARTHROSCOPIC;  Surgeon: Valerie Olivera MD;  Location: [facility] OR;  Service: Orthopedics;  Laterality: Right;  HETAL LEMUS [phone]/ TEXTED ALLAN ON 6/23/2022 @ 9:47AM. ALLAN RESPONDED ON 6/23/2022 @ 10:33AM-LO  JEAN PAUL BABIN [phone] MY BE HERE FOR CASE SPOKE TO HIM @ 9:59AM ON 7-1-2022  RN PREOP 6/28/2022    BACK SURGERY      2002    CATARACT EXTRACTION W/  INTRAOCULAR LENS IMPLANT Right 08/29/2017    Dr. Hong    CATARACT EXTRACTION W/  INTRAOCULAR LENS IMPLANT Left 02/24/2022    Dr. Hong    CAUDAL EPIDURAL STEROID INJECTION N/A 9/25/2024    Procedure: Caudal Epidural Steroid Injection;  Surgeon: Eze Byrd Jr., MD;  Location: [facility] PAIN MANAGEMENT;  Service: Pain Management;  Laterality: N/A;  @1100(prev. 715)  Eliquis last 9/21  Plavix last 9/19  Check BG  E-Consent    COLONOSCOPY N/A 2/21/2017    Procedure: COLONOSCOPY;  Surgeon: Robb Rosado MD;  Location: [facility] ENDO;  Service: Endoscopy;  Laterality: N/A;    COLONOSCOPY N/A 7/5/2023    Procedure: COLONOSCOPY;  Surgeon: Aston Varela MD;  Location: [facility] ENDO;  Service: Endoscopy;  Laterality: N/A;  Referral: JOVANNI SCANLON to hold Plavix 5 days and Eliquis 2 days per Dr Purdy-OFELIA   Meds  Suprep   Inst portal   LW    CORONARY ANGIOGRAPHY INCLUDING BYPASS GRAFTS WITH CATHETERIZATION OF LEFT HEART N/A 11/11/2020    Procedure: ANGIOGRAM, CORONARY, INCLUDING BYPASS GRAFT, WITH LEFT HEART CATHETERIZATION;  Surgeon: Lane Cuellar MD;  Location: [facility] CATH LAB;  Service: Cardiology;  Laterality: N/A;  RN PRE OP 11-5-2020. --COVID NEGATIVE ON  11-. C A    CORONARY ARTERY BYPASS GRAFT      March 2016    EPIDURAL STEROID  INJECTION Bilateral 2018    Procedure: Lumbar Medial Branch Blocks;  Surgeon: Eze Byrd Jr., MD;  Location: [facility] ENDO;  Service: Pain Management;  Laterality: Bilateral;  Bilateral Lumbar Medial Branch Blocks L2, L3, L4, L5    40434  [id number]    Arrive @ 1150; NO Sedation    EPIDURAL STEROID INJECTION Bilateral 2018    Procedure: Injection, Steroid, Epidural;  Surgeon: Eze Byrd Jr., MD;  Location: [facility] ENDO;  Service: Pain Management;  Laterality: Bilateral;  Bilateral Sacroiliac Joint Steroid Injections    16778    Arrive @ 0910    EPIDURAL STEROID INJECTION Bilateral 3/20/2019    Procedure: Injection, Steroid, Sacroiliiac Joint;  Surgeon: Eze Byrd Jr., MD;  Location: [facility] ENDO;  Service: Pain Management;  Laterality: Bilateral;  Bilateral Sacroiliac Joint Steroid Injections    31871    Arrive @ 1145; Trigger point injections also?    EPIDURAL STEROID INJECTION Right 2023    Procedure: Right L5 Transforaminal Epidural Steroid Injection;  Surgeon: Eze Byrd Jr., MD;  Location: [facility] ENDO;  Service: Pain Management;  Laterality: Right;  @0830 (given)  Eliquis last   Plavix last   Check BG    EPIDURAL STEROID INJECTION Right 10/11/2023    Procedure: Right L3 (infraneural) + S1 Transforaminal Epidural Steroid Injections;  Surgeon: Eze Byrd Jr., MD;  Location: [facility];  Service: Pain Management;  Laterality: Right;  @0745 (requests morning)  Eliquis 10/7 & Plavix 10/5  Check BG    ESOPHAGOGASTRODUODENOSCOPY N/A 2023    Procedure: EGD (ESOPHAGOGASTRODUODENOSCOPY);  Surgeon: Anita Oswald MD;  Location: [facility];  Service: Endoscopy;  Laterality: N/A;  Procedure Timin-4 weeks  Provider: Any GI provider  Location: No Preference  Additional Scheduling Information: Blood thinners  Prep Specifications:N/A   ref. by Dr. Light, instr. to [place], ,  meds-st  ok to hold Plavix 5 days and Eliquis 2 day    INTRAOCULAR PROSTHESES  INSERTION Left 2022    Procedure: INSERTION, IOL PROSTHESIS;  Surgeon: Nickolas Hong MD;  Location: [facility] OR;  Service: Ophthalmology;  Laterality: Left;    LAMINOFORAMINOTOMY OF SPINE USING MINIMALLY INVASIVE TECHNIQUE Right 2025    Procedure: LAMINOFORAMINOTOMY, SPINE, MINIMALLY INVASIVE;  Surgeon: Luis M Ellis MD;  Location: [facility] OR;  Service: Neurosurgery;  Laterality: Right;  Right L4/5 minimally invasive laminotomy/microdiscectomy. silvana table, lucio frame, fluoro, microscope, no vendor, no monitoring  CLEARED BY CARDS AND PCP   RN PREOP 24---T/S--done -----NEED ORDERS    PHACOEMULSIFICATION OF CATARACT Left 2022    Procedure: PHACOEMULSIFICATION, CATARACT;  Surgeon: Nickolas Hong MD;  Location: [facility] OR;  Service: Ophthalmology;  Laterality: Left;  RN Phone Pre Op 22.  Covid NEGATIVE  22.  Arrival 08:00 am.    TONSILLECTOMY         GOALS:   Multidisciplinary Problems       Physical Therapy Goals          Problem: Physical Therapy    Goal Priority Disciplines Outcome Interventions   Physical Therapy Goal     PT, PT/OT Progressing    Description: Goals to be met by: 25     Patient will increase functional independence with mobility by performin. Supine to sit with MInimal Assistance  2. Sit to supine with MInimal Assistance  3. Sit to stand transfer with Supervision  4. Bed to chair transfer with Supervision using LRAD as needed  5. Gait  x 150 feet with Supervision using LRAD as needed.   6. Lower extremity exercise program x10 reps per handout, with assistance as needed    DME Justifications (see above for complete DME recommendations)    Bedside Commode- Patient has a mobility limitation that significantly impairs their ability to participate in one or more mobility related activities of daily living, including toileting. This deficit can be resolved by using a bedside commode. Patient demonstrates mobility limitations that will cause  them to be confined to one room at home without bathroom access for up to 30 days. Using a bedside commode will greatly improve the patient's ability to participate in MRADLs.     Rolling Walker- Patient demonstrates a mobility limitation that significantly impairs their ability to participate in one or more mobility related activities of daily living. Patient's mobility limitation cannot be sufficiently resolved with the use of a cane, but can be sufficiently resolved with the use of a rolling walker.The use of a rolling walker will considerably improve their ability to participate in MRADLs. Patient will use the walker on a regular basis at home.                               Time Tracking:     PT Received On: 01/12/25  PT Start Time: 0909     PT Stop Time: 0943  PT Total Time (min): 34 min     Billable Minutes: Evaluation 10, Gait Training 15, and Therapeutic Activity 9      Natalia Hdz, PT  01/12/2025  Pager# [phone]

## 2025-01-12 NOTE — ASSESSMENT & PLAN NOTE
Bilateral MCA/PCA watershed territory infarcts, likely due to hypotension in setting on syncope. See primary problem.

## 2025-01-12 NOTE — PROGRESS NOTES
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 Hx HTN, HLD, Afib on Eliuquis, CAD s/p CABG, CKD3, insulin dependent diabetes. Admitted to [facility] overnight 1/9 to 1/10 following a syncopal episode w/fall, as well as encephalopathy. Seen by tele-neurology. Recommended MRI (demonstrated bilateral MCA/PCA watershed infarcts), MRA (demonstrated diminished flow in the distal BL MCA), EEG (posterior slowing), and CTA head/neck (unrevealing on 1/10). Patient exhibited an acute change this morning around 10AM, described as left hemiplegia and dysarthria. Stroke code called, and repeat CTA demonstrated R M2 occlusion. No TNK due to recent Eliquis (08:13 that AM, 20:40 the evening before). [place] [facility] [place]. [place] [facility] DSA demonstrated recanalization of R M2, no intervention performed. Admitted to [facility] [place] for post-procedure monitoring. 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 Eliquis and Plavix restarted. Examination with improved left-sided neglect, though is still present. Some components of Balint Syndrome as well, fits w/location of bilateral watershed infarcts. Stable for stepdown to NPU.    Please repeat MRI brain w/o contrast to assess for stroke burden following right M2 occlusion.    Antithrombotics for secondary stroke prevention: Anticoagulants: Apixaban 5 mg BID , Antiplatelets: Plavix 75mg daily    Statins for secondary stroke prevention and hyperlipidemia, if present:   Statins: Atorvastatin- 80 mg daily    Aggressive risk factor modification: HTN, DM, HLD, CAD     Rehab efforts: The patient has been evaluated by a stroke team provider and the therapy needs have been fully considered based off the presenting complaints and exam findings. The following therapy evaluations are needed: PT evaluate and treat, OT evaluate and  treat, SLP evaluate and treat, PM&R evaluate for appropriate placement    Diagnostics ordered/pending: MRI head without contrast to assess brain parenchyma    VTE prophylaxis: Mechanical prophylaxis: Place SCDs    BP parameters: Infarct: Post unsucessful thrombectomy, SBP <220        Arterial ischemic stroke, multifocal, multiple vascular territories, acute  Bilateral MCA/PCA watershed territory infarcts, likely due to hypotension in setting on syncope. See primary problem.    Longstanding persistent atrial fibrillation  Stroke risk-factor. Echo on 1/10 with Afib. On Eliquis: received at 20:40 on 1/10, and at 08:13 on 1/11, both prior to acute neurologic decline (right MCA syndrome). Eliquis restarted yesterday evening.    -continue apixaban 5mg BID    Hx of CABG  Continue statin, fibrate, Plavix as detailed elsewhere.    Essential hypertension  Stroke risk-factor.    -hydralazine 50mg BID    Mixed hyperlipidemia  Stroke risk-factor. LDL 82 on admit.    -Lipitor 80mg  -fenofibrate    Type 2 diabetes mellitus with peripheral neuropathy  Stroke risk-factor. A1C 6.1% on admission.    -goal 140 to 180 while admitted  -sliding scale insulin  -accuchecks         1/12/25: taken for endovascular thrombectomy, RM2 recanalized spontaneously, examination improved today with less dense left-side neglect though is still present, Eliquis and Plavix both restarted, stable for step-down to NPU    STROKE DOCUMENTATION   Acute Stroke Times   Last Known Normal Date: 01/11/25  Last Known Normal Time: 0950  Symptom Onset Date: 01/11/25  Symptom Onset Time: 0950  Stroke Team Called Date: 01/11/25  Stroke Team Called Time: 1320  Stroke Team Arrival Date: 01/11/25  Stroke Team Arrival Time: 1320  CT Interpretation Time:  (done at OSH)  Thrombolytic Therapy Recommended: No  Thrombectomy Recommended: Yes  Decision to Treat Time for IR: 1140 (Made at OSH)    NIH Scale:  1a. Level of Consciousness: 0-->Alert, keenly responsive  1b. LOC  Questions: 2-->Answers neither question correctly  1c. LOC Commands: 0-->Performs both tasks correctly  2. Best Gaze: 0-->Normal  3. Visual: 0-->No visual loss  4. Facial Palsy: 1-->Minor paralysis (flattened nasolabial fold, asymmetry on smiling)  5a. Motor Arm, Left: 1-->Drift, limb holds 90 (or 45) degrees, but drifts down before full 10 seconds, does not hit bed or other support  5b. Motor Arm, Right: 0-->No drift, limb holds 90 (or 45) degrees for full 10 secs  6a. Motor Leg, Left: 1-->Drift, leg falls by the end of the 5-sec period but does not hit bed  6b. Motor Leg, Right: 0-->No drift, leg holds 30 degree position for full 5 secs  7. Limb Ataxia: 0-->Absent  8. Sensory: 1-->Mild-to-moderate sensory loss, patient feels pinprick is less sharp or is dull on the affected side, or there is a loss of superficial pain with pinprick, but patient is aware of being touched  9. Best Language: 0-->No aphasia, normal  10. Dysarthria: 1-->Mild-to-moderate dysarthria, patient slurs at least some words and, at worst, can be understood with some difficulty  11. Extinction and Inattention (formerly Neglect): 1-->Visual, tactile, auditory, spatial, or personal inattention or extinction to bilateral simultaneous stimulation in one of the sensory modalities  Total (NIH Stroke Scale): 8       Modified Henderson Score: 4  Mansfield Coma Scale:14   ABCD2 Score:    [id number] Score:   HAS -BLED Score:   ICH Score:   Hunt & Zamudio Classification:      Hemorrhagic change of an Ischemic Stroke: Does this patient have an ischemic stroke with hemorrhagic changes? No     Neurologic Chief Complaint: left sided weakness and neglect    Subjective:     Interval History: Patient is seen for follow-up neurological assessment and treatment recommendations: see hospital course    HPI, Past Medical, Family, and Social History remains the same as documented in the initial encounter.     Review of Systems   Neurological:  Positive for weakness.  "    Scheduled Meds:   apixaban  5 mg Oral BID    atorvastatin  80 mg Oral QHS    clopidogreL  75 mg Oral Daily    fenofibrate  48 mg Oral Daily    fluticasone propionate  2 spray Each Nostril BID    hydrALAZINE  50 mg Oral Q12H    mupirocin   Nasal BID    pantoprazole  40 mg Oral Daily    polyethylene glycol  17 g Oral Daily    senna-docusate 8.6-50 mg  1 tablet Oral BID    tamsulosin  0.4 mg Oral Daily     Continuous Infusions:  PRN Meds:  Current Facility-Administered Medications:     acetaminophen, 650 mg, Oral, Q6H PRN    aluminum-magnesium hydroxide-simethicone, 30 mL, Oral, QID PRN    bisacodyL, 10 mg, Rectal, Daily PRN    carboxymethylcellulose sodium, 1 drop, Ophthalmic, QID PRN    hydrALAZINE, 10 mg, Intravenous, Q4H PRN    hydrocortisone, , Topical (Top), BID PRN    hydrOXYzine HCL, 25 mg, Oral, TID PRN    labetalol, 10 mg, Intravenous, Q4H PRN    ondansetron, 4 mg, Intravenous, Q4H PRN    oxyCODONE, 5 mg, Oral, Q4H PRN    sodium chloride 0.9%, 10 mL, Intravenous, PRN    Objective:     Vital Signs (Most Recent):  Temp: 98 °F (36.7 °C) (01/12/25 1501)  Pulse: 74 (01/12/25 1501)  Resp: 18 (01/12/25 1501)  BP: (!) 146/65 (01/12/25 1501)  SpO2: 95 % (01/12/25 1501)  BP Location: Right arm    Vital Signs Range (Last 24H):  Temp:  [97.7 °F (36.5 °C)-98.1 °F (36.7 °C)]   Pulse:  [49-82]   Resp:  [12-45]   BP: (107-191)/(53-84)   SpO2:  [90 %-100 %]   BP Location: Right arm       Physical Exam  Vitals and nursing note reviewed.   HENT:      Head: Normocephalic and atraumatic.      Mouth/Throat:      Mouth: Mucous membranes are moist.   Cardiovascular:      Rate and Rhythm: Normal rate and regular rhythm.   Pulmonary:      Effort: Pulmonary effort is normal.   Skin:     General: Skin is warm and dry.              Neurological Exam:   LOC: alert  Attention Span: poor  Language: No aphasia  Articulation: Dysarthria  Orientation: oriented to person, place (not hospital); disoriented to time ("75")  Visual Fields: " Full  EOM (CN III, IV, VI): Full/intact  Pupils (CN II, III): PERRL  Facial Sensation (CN V): Normal  Facial Movement (CN VII): Lower facial weakness on the Left  Motor: Arm left  Paresis: 4/5  Leg left  Paresis: 4/5  Arm right  Normal 5/5  Leg right Normal 5/5  Cerebellum: optic ataxia  Sensation: Jan-hypoesthesia left and Tactile extinction to bilateral simultaneous stimulation     Left-sided neglect, optic ataxia, optic apraxia, simultagnosia    Laboratory:  CMP:   Recent Labs   Lab 01/12/25  0028   CALCIUM 9.2   ALBUMIN 3.2*   PROT 6.9      K 3.9   CO2 18*      BUN 17   CREATININE 0.9   ALKPHOS 30*   ALT 12   AST 22   BILITOT 1.1*     CBC:   Recent Labs   Lab 01/12/25  0028   WBC 7.50   RBC 4.95   HGB 13.2*   HCT 40.4      MCV 82   MCH 26.7*   MCHC 32.7     Lipid Panel:   Recent Labs   Lab 01/11/25  1733   CHOL 125   LDLCALC 82.4   HDL 27*   TRIG 78     Coagulation:   Recent Labs   Lab 01/11/25  1733   INR 1.2   APTT 29.5     Hgb A1C:   Recent Labs   Lab 01/11/25  1733   HGBA1C 6.1*     TSH:   Recent Labs   Lab 01/10/25  0328   TSH 8.716*       Diagnostic Results     Brain and Vessel imaging:  MRI Brain: 1/10/25  Bilateral posterior parietal cortical infarcts right greater than left.          MRA Head/Neck: 1/10/25  1. There is diminished flow signal and luminal narrowing in the distal MCA distribution bilaterally in the posterior parietal region in an area of infarction.  Vascular consultation and follow-up recommended.  CTA may better characterize.  2. There is bilateral flow signal loss in the distal vertebral arteries bilaterally of indeterminate etiology.  This could be artifact.  High-grade narrowing/diminished flow of the distal V4 segment of the left vertebral artery is a possibility.  Mild narrowing or diminished flow of the V4 segment of the right vertebral artery is not excluded also.  Basilar artery demonstrates normal flow signal.  CTA may better characterize.  Vascular  consultation and follow-up recommended.  3. Posterior cerebral arteries are patent proximally bilaterally.  P1 and P2 segments are adequately demonstrated.  Probable mild narrowing in the P2 segment on the left.. Possible mild luminal narrowing and diminished flow signal in the more distal components of the PCA distribution bilaterally.     CTA Head and Neck: 1/10  1. Bilateral parietal lobe regions of acute infarction, as seen on earlier MRI.  2. CTA head and neck with no evidence of high-grade stenosis, large vessel occlusion, or dissection.  3. Approximately 50% stenosis at the right ICA origin.  4. Indeterminate small bilateral parotid enhancing lesions.     CTA Head and Neck: 1/11  Evolving parieto-occipital infarcts again identified. Irregular calcified and soft plaque at the carotid bifurcations however less than 50% stenosis by NASCET criteria. At least moderate stenosis at the origin of the vertebral arteries bilaterally. Evaluation of the [id number] demonstrates a new right M2 branch occlusion, the occlusion is somewhat distal although the branch is quite large in size.     Cardiac Evaluation:   Echo: 1/10/25    Left Ventricle: The left ventricle is normal in size. Mildly increased wall thickness. There is mild concentric hypertrophy. There is normal systolic function with a visually estimated ejection fraction of 55 - 60%. Unable to assess diastolic function due to atrial fibrillation.    Right Ventricle: Systolic function is normal.    Aortic Valve: The aortic valve is a trileaflet valve. There is moderate aortic valve sclerosis. Mildly restricted motion. There is mild stenosis. Aortic valve area by VTI is 1.9 cm². Aortic valve peak velocity is 1.5 m/s. Mean gradient is 5.3 mmHg. The dimensionless index is 0.59.    Tricuspid Valve: There is mild regurgitation.    Pulmonary Artery: The estimated pulmonary artery systolic pressure is 93 mmHg.    Pt in AFib throughout exam.    John Dorantes  DO  [facility]  Department of Vascular Neurology   [facility] - Neuro Critical Care

## 2025-01-12 NOTE — PLAN OF CARE
POC established and functional mobility goals were created to help pt return to PLOF. Will be reassessed as appropriate to measure pt progress.    Problem: Physical Therapy  Goal: Physical Therapy Goal  Description: Goals to be met by: 25     Patient will increase functional independence with mobility by performin. Supine to sit with MInimal Assistance  2. Sit to supine with MInimal Assistance  3. Sit to stand transfer with Supervision  4. Bed to chair transfer with Supervision using LRAD as needed  5. Gait  x 150 feet with Supervision using LRAD as needed.   6. Lower extremity exercise program x10 reps per handout, with assistance as needed    DME Justifications (see above for complete DME recommendations)    Bedside Commode- Patient has a mobility limitation that significantly impairs their ability to participate in one or more mobility related activities of daily living, including toileting. This deficit can be resolved by using a bedside commode. Patient demonstrates mobility limitations that will cause them to be confined to one room at home without bathroom access for up to 30 days. Using a bedside commode will greatly improve the patient's ability to participate in MRADLs.     Rolling Walker- Patient demonstrates a mobility limitation that significantly impairs their ability to participate in one or more mobility related activities of daily living. Patient's mobility limitation cannot be sufficiently resolved with the use of a cane, but can be sufficiently resolved with the use of a rolling walker.The use of a rolling walker will considerably improve their ability to participate in MRADLs. Patient will use the walker on a regular basis at home.          Outcome: Progressing

## 2025-01-12 NOTE — PT/OT/SLP EVAL
Speech Language Pathology Evaluation  Bedside Swallow    Patient Name:  Driss Hernandez Jr.   MRN:  [id number]  Admitting Diagnosis: Arterial ischemic stroke, multifocal, multiple vascular territories, acute    Recommendations:                 General Recommendations:  Dysphagia therapy, Speech language evaluation, and Cognitive-linguistic evaluation  Diet recommendations:  Regular Diet - IDDSI Level 7, Thin liquids - IDDSI Level 0   Aspiration Precautions: 1 bite/sip at a time, Assistance with meals, Check for pocketing/oral residue, Eliminate distractions, Feed only when awake/alert, Frequent oral care, HOB to 90 degrees, Meds whole 1 at a time, Monitor for s/s of aspiration, Remain upright 30 minutes post meal, Small bites/sips, and Standard aspiration precautions   General Precautions: Standard, aspiration, fall, aphasia  Communication strategies:  provide increased time to answer and go to room if call light pushed    Assessment:     Driss Hernandez Jr. is a 74 y.o. male with an SLP diagnosis of Aphasia and mild oral Dysphagia.      History:     Past Medical History:   Diagnosis Date    Chronic heart failure with preserved ejection fraction 2/9/2023    Chronic midline low back pain without sciatica 10/2/2017    Colon polyps     Coronary artery disease involving native coronary artery of native heart 3/5/2020    Coronary artery disease involving native coronary artery of native heart with angina pectoris 12/10/2020    CVA (cerebral vascular accident) 1/11/2025    Diabetes mellitus with neurological manifestations, uncontrolled 1/24/2017    Diabetic polyneuropathy associated with type 2 diabetes mellitus 1/24/2017    Diabetic polyneuropathy associated with type 2 diabetes mellitus     Encephalopathy 1/10/2025    Essential hypertension 1/24/2017    Gastroesophageal reflux disease 1/24/2017    Hyperlipidemia 1/24/2017    Insomnia 1/24/2017    Long-term insulin use 1/24/2017    Longstanding persistent atrial  fibrillation 12/30/2020    Lumbar spondylosis 11/13/2017    Nuclear sclerosis of both eyes 8/24/2017    Obesity     PAD (peripheral artery disease) 3/23/2022    Stage 3 chronic kidney disease 2/13/2019    Uncontrolled type 2 diabetes mellitus without complication, with long-term current use of insulin 1/24/2017       Past Surgical History:   Procedure Laterality Date    ARTHROSCOPIC REPAIR OF ROTATOR CUFF OF SHOULDER Right 7/5/2022    Procedure: REPAIR, ROTATOR CUFF, ARTHROSCOPIC;  Surgeon: Valerie Olivera MD;  Location: [facility] OR;  Service: Orthopedics;  Laterality: Right;  HETAL LEMUS [phone]/ TEXTED ALLAN ON 6/23/2022 @ 9:47AM. ALLAN RESPONDED ON 6/23/2022 @ 10:33AM-LO  JEAN PAUL BABIN [phone] MY BE HERE FOR CASE SPOKE TO HIM @ 9:59AM ON 7-1-2022  RN PREOP 6/28/2022    BACK SURGERY      2002    CATARACT EXTRACTION W/  INTRAOCULAR LENS IMPLANT Right 08/29/2017    Dr. Hong    CATARACT EXTRACTION W/  INTRAOCULAR LENS IMPLANT Left 02/24/2022    Dr. Hong    CAUDAL EPIDURAL STEROID INJECTION N/A 9/25/2024    Procedure: Caudal Epidural Steroid Injection;  Surgeon: Eze Byrd Jr., MD;  Location: [facility] PAIN MANAGEMENT;  Service: Pain Management;  Laterality: N/A;  @1100(prev. 715)  Eliquis last 9/21  Plavix last 9/19  Check BG  E-Consent    COLONOSCOPY N/A 2/21/2017    Procedure: COLONOSCOPY;  Surgeon: Robb Rosado MD;  Location: [facility] ENDO;  Service: Endoscopy;  Laterality: N/A;    COLONOSCOPY N/A 7/5/2023    Procedure: COLONOSCOPY;  Surgeon: Aston Varela MD;  Location: [facility] ENDO;  Service: Endoscopy;  Laterality: N/A;  Referral: JOVANNI SCANLON to hold Plavix 5 days and Eliquis 2 days per Dr [facility] Meds  Suprep   Inst portal   LW    CORONARY ANGIOGRAPHY INCLUDING BYPASS GRAFTS WITH CATHETERIZATION OF LEFT HEART N/A 11/11/2020    Procedure: ANGIOGRAM, CORONARY, INCLUDING BYPASS GRAFT, WITH LEFT HEART CATHETERIZATION;  Surgeon: Lane Cuellar MD;   Location: [facility] CATH LAB;  Service: Cardiology;  Laterality: N/A;  RN PRE OP 2020. --COVID NEGATIVE ON  11-. C A    CORONARY ARTERY BYPASS GRAFT      2016    EPIDURAL STEROID INJECTION Bilateral 2018    Procedure: Lumbar Medial Branch Blocks;  Surgeon: Eze Byrd Jr., MD;  Location: [facility] ENDO;  Service: Pain Management;  Laterality: Bilateral;  Bilateral Lumbar Medial Branch Blocks L2, L3, L4, L5    [id number]  [id number]    Arrive @ 1150; NO Sedation    EPIDURAL STEROID INJECTION Bilateral 2018    Procedure: Injection, Steroid, Epidural;  Surgeon: Eze Byrd Jr., MD;  Location: [facility] ENDO;  Service: Pain Management;  Laterality: Bilateral;  Bilateral Sacroiliac Joint Steroid Injections    10645    Arrive @ 0910    EPIDURAL STEROID INJECTION Bilateral 3/20/2019    Procedure: Injection, Steroid, Sacroiliiac Joint;  Surgeon: Eze Byrd Jr., MD;  Location: [facility] ENDO;  Service: Pain Management;  Laterality: Bilateral;  Bilateral Sacroiliac Joint Steroid Injections    90454    Arrive @ 1145; Trigger point injections also?    EPIDURAL STEROID INJECTION Right 2023    Procedure: Right L5 Transforaminal Epidural Steroid Injection;  Surgeon: Eze Byrd Jr., MD;  Location: [facility] ENDO;  Service: Pain Management;  Laterality: Right;  @0830 (given)  Eliquis last   Plavix last   Check BG    EPIDURAL STEROID INJECTION Right 10/11/2023    Procedure: Right L3 (infraneural) + S1 Transforaminal Epidural Steroid Injections;  Surgeon: Eze Byrd Jr., MD;  Location: [facility] ENDO;  Service: Pain Management;  Laterality: Right;  @0745 (requests morning)  Eliquis 10/7 & Plavix 10/5  Check BG    ESOPHAGOGASTRODUODENOSCOPY N/A 2023    Procedure: EGD (ESOPHAGOGASTRODUODENOSCOPY);  Surgeon: Anita Oswald MD;  Location: [facility];  Service: Endoscopy;  Laterality: N/A;  Procedure Timin-4 weeks  Provider: Any GI provider  Location: No Preference  Additional  Scheduling Information: Blood thinners  Prep Specifications:N/A  9/1 ref. by Dr. Light, instr. to portal, , [facility] meds-st  ok to hold Plavix 5 days and Eliquis 2 day    INTRAOCULAR PROSTHESES INSERTION Left 2/24/2022    Procedure: INSERTION, IOL PROSTHESIS;  Surgeon: Nickolas Hong MD;  Location: [facility] OR;  Service: Ophthalmology;  Laterality: Left;    LAMINOFORAMINOTOMY OF SPINE USING MINIMALLY INVASIVE TECHNIQUE Right 1/6/2025    Procedure: LAMINOFORAMINOTOMY, SPINE, MINIMALLY INVASIVE;  Surgeon: Luis M Ellis MD;  Location: [facility] OR;  Service: Neurosurgery;  Laterality: Right;  Right L4/5 minimally invasive laminotomy/microdiscectomy. silvana table, lucio frame, fluoro, microscope, no vendor, no monitoring  CLEARED BY CARDS AND PCP   RN PREOP 12/30/24---T/S--done 12/30-----NEED ORDERS    PHACOEMULSIFICATION OF CATARACT Left 2/24/2022    Procedure: PHACOEMULSIFICATION, CATARACT;  Surgeon: Nickolas Hong MD;  Location: [facility] OR;  Service: Ophthalmology;  Laterality: Left;  RN Phone Pre Op 2-16-22.  Covid NEGATIVE  2-23-22.  Arrival 08:00 am.    TONSILLECTOMY       History of Present Illness: 73 y/o M PMH HTN, HLD, Afib on Eliquis (last received on 1/11 AM), CAD s/p CABG, CKD3, insulin dependent diabetes. He had a MIS Right L4-5 laminoforaminotomy 1/6 and was off of his AC/AP during that time. At home, after the procedure, he noticed R hand numbness/weakness and did not seek treatment. He was then admitted to [facility] overnight 1/9 following a syncopal episode w/ fall and encephalopathy. At the OSH, MRI demonstrated bilateral MCA/PCA infarcts, MRA showed diminished flow in the distal BL MCA, EEG showed posterior slowing, and CTA head/neck. Patient exhibited an acute change this morning around 10AM, described as left hemiplegia and dysarthria. Stroke code called, and repeat CTA demonstrated R M2 occlusion. Patient was transferred to [facility] for higher level of care. NIHSS on arrival of 8  (left-sided inattention/neglect, dysarthria, LUE drift). Taken for DSA which showed recanalization of R M2, no intervention performed. Admitted to [facility] for post-procedure monitoring and higher level of care.     Social History: Patient lives with family.    Prior Intubation HX:  1/11    Chest X-Rays: none this admission    Prior diet: regular with thin liquids    Subjective     Spoke with RN prior to entering pt's room. Pt seen bedside for session with family present. Alert and agreeable to ST. Regular breakfast tray at the bedside.      Pain/Comfort:  Pain Rating 1: 0/10  Pain Rating Post-Intervention 1: 0/10    Respiratory Status: Room air    Objective:     Oral Musculature Evaluation  Oral Musculature: left weakness  Dentition: present and adequate  Secretion Management: adequate  Mucosal Quality: adequate  Oral Labial Strength and Mobility: impaired seal  Volitional Cough: elicited  Volitional Swallow: elicited  Voice Prior to PO Intake: clear    Bedside Swallow Eval:   Consistencies Assessed:  Thin liquids via cup and single/consecutive straw sips  Puree applesauce  Solids gallo      Oral Phase:   Anterior loss with solids on left side  Pocketing   Left mild    Pharyngeal Phase:   no overt clinical signs/symptoms of aspiration  no overt clinical signs/symptoms of pharyngeal dysphagia    Compensatory Strategies  Liquid wash  Lingual sweep    Treatment: Provided education to patient and family  re: role of ST,  POC, swallow precautions (including close supervision during meals to ensure pt clears any pocketed material from left side), recommendations to continue regular diet with  thin  liquids, and plan to f/u to ensure diet tolerance and to complete cognitive-communication evaluation. They verbalized understanding. White board updated. RN notified of results and recs. At end of session, pt remained bedside with call light and all needs in reach.       Goals:   Multidisciplinary Problems       SLP Goals           Problem: SLP    Goal Priority Disciplines Outcome   SLP Goal     SLP    Description: Goals expected to be met by 1/19:  1. Pt will tolerate least restrictive diet without overt s/sx airway threat.    2. Pt will participate in cognitive-linguistic evaluation to further develop POC.                          Plan:     Patient to be seen:  4 x/week   Plan of Care expires:  02/10/25  Plan of Care reviewed with:  patient, spouse, daughter, grandchild(maryjo)   SLP Follow-Up:  Yes       Discharge recommendations:  High Intensity Therapy   Barriers to Discharge:  Level of Skilled Assistance Needed      Time Tracking:     SLP Treatment Date:   01/12/25  Speech Start Time:  0814  Speech Stop Time:  0823     Speech Total Time (min):  9 min    Billable Minutes: Eval Swallow and Oral Function 9    01/12/2025

## 2025-01-12 NOTE — ASSESSMENT & PLAN NOTE
Noticed on ECHO at OSH  PASP 93  Given high contrast load the last today with chronically impaired kidney function and no clinical symptoms of PE currently, PE protocol scheduled for tomorrow AM

## 2025-01-12 NOTE — NURSING
Pt arrived back to room following MRI        Pt was escorted by RN on cardiac monitoring and ambu bag.        Patient placed back on bedside monitor with alarms audible, bed in low position with bed alarm on, call light within reach. ZAIDA.

## 2025-01-12 NOTE — PLAN OF CARE
[facility] Care Plan    POC reviewed with Driss Hernandez Jr. and family at 0300. Patient and Family verbalized understanding. Questions and concerns addressed. No acute events today. Pt progressing toward goals. Will continue to monitor. See below and flowsheets for full assessment and VS info.       - Cardene gtt off.  - PRN hydralazine given x 2 to maintain systolic goal less than 180.  - Straight cath x 1. Output 800  - Hydroxyzine, benadryl, and hydrocortisone cream given for itching.  - Eye drops given for eye burning.   - Tylenol and oxycodone 10 mg given for back pain.   - Simethicone given for hiccups and heartburn.       Is this a stroke patient? yes- Stroke booklet reviewed with patient and family, risk factors identified for patient and stroke booklet remains at bedside for ongoing education.     Care individualization: Lights dimmed, care clustered    Neuro:  Cora Coma Scale  Best Eye Response: 4-->(E4) spontaneous  Best Motor Response: 6-->(M6) obeys commands  Best Verbal Response: 4-->(V4) confused  Cora Coma Scale Score: 14  Assessment Qualifiers: patient chemically sedated or paralyzed  Pupil PERRLA: yes     24 hr Temp:  [97.9 °F (36.6 °C)-98.9 °F (37.2 °C)]     CV:   Rhythm: atrial rhythm  BP goals:   SBP < 180  MAP > 65    Resp:           Plan: N/A    GI/:     Diet/Nutrition Received: regular  Last Bowel Movement: 01/09/25  Voiding Characteristics: external catheter    Intake/Output Summary (Last 24 hours) at 1/12/2025 0522  Last data filed at 1/12/2025 0201  Gross per 24 hour   Intake 873.03 ml   Output 1025 ml   Net -151.97 ml     Unmeasured Output  Urine Occurrence: 1  Pad Count: 1    Labs/Accuchecks:  Recent Labs   Lab 01/12/25  0028   WBC 7.50   RBC 4.95   HGB 13.2*   HCT 40.4         Recent Labs   Lab 01/12/25  0028      K 3.9   CO2 18*      BUN 17   CREATININE 0.9   ALKPHOS 30*   ALT 12   AST 22   BILITOT 1.1*      Recent Labs   Lab 01/11/25  1733   INR 1.2   APTT  "29.5    No results for input(s): "CPK", "CPKMB", "TROPONINI", "MB" in the last 168 hours.    Electrolytes: No replacement orders  Accuchecks: Q6H    Gtts:      LDA/Wounds:    Garfield Risk Assessment  Sensory Perception: 3-->slightly limited  Moisture: 3-->occasionally moist  Activity: 1-->bedfast  Mobility: 3-->slightly limited  Nutrition: 3-->adequate  Friction and Shear: 2-->potential problem  Garfield Score: 15  Is your garfield score 12 or less? no          Restraints:        WCTM   Problem: Adult Inpatient Plan of Care  Goal: Absence of Hospital-Acquired Illness or Injury  Intervention: Identify and Manage Fall Risk  Flowsheets (Taken 1/12/2025 0520)  Safety Promotion/Fall Prevention:   bed alarm set   Fall Risk reviewed with patient/family   family expresses understanding of fall risk and prevention   pulse ox   side rails raised x 3     Problem: Infection  Goal: Absence of Infection Signs and Symptoms  Intervention: Prevent or Manage Infection  Flowsheets (Taken 1/12/2025 0520)  Fever Reduction/Comfort Measures:   lightweight bedding   lightweight clothing  Infection Management: aseptic technique maintained  Isolation Precautions:   precautions maintained   protective     Problem: Fall Injury Risk  Goal: Absence of Fall and Fall-Related Injury  Intervention: Promote Injury-Free Environment  Flowsheets (Taken 1/12/2025 0520)  Safety Promotion/Fall Prevention:   bed alarm set   Fall Risk reviewed with patient/family   family expresses understanding of fall risk and prevention   pulse ox   side rails raised x 3     Problem: Skin Injury Risk Increased  Goal: Skin Health and Integrity  Intervention: Optimize Skin Protection  Flowsheets (Taken 1/12/2025 0520)  Pressure Reduction Techniques:   frequent weight shift encouraged   weight shift assistance provided  Pressure Reduction Devices: specialty bed utilized  Skin Protection: incontinence pads utilized  Activity Management: Rolling - L1  Head of Bed (HOB) Positioning: " HOB at 30 degrees     Problem: Pain Acute  Goal: Optimal Pain Control and Function  Intervention: Develop Pain Management Plan  Flowsheets (Taken 1/12/2025 0520)  Pain Management Interventions:   care clustered   medication offered   position adjusted   pillow support provided   quiet environment facilitated  Intervention: Prevent or Manage Pain  Flowsheets (Taken 1/12/2025 0520)  Sensory Stimulation Regulation:   auditory stimulation minimized   care clustered   lighting decreased     Problem: Diabetes Comorbidity  Goal: Blood Glucose Level Within Targeted Range  Intervention: Monitor and Manage Glycemia  Flowsheets (Taken 1/12/2025 0520)  Glycemic Management: blood glucose monitored     Problem: Wound  Goal: Absence of Infection Signs and Symptoms  Intervention: Prevent or Manage Infection  Flowsheets (Taken 1/12/2025 0520)  Fever Reduction/Comfort Measures:   lightweight bedding   lightweight clothing  Infection Management: aseptic technique maintained  Isolation Precautions:   precautions maintained   protective  Goal: Skin Health and Integrity  Intervention: Optimize Skin Protection  Flowsheets (Taken 1/12/2025 0520)  Pressure Reduction Techniques:   frequent weight shift encouraged   weight shift assistance provided  Pressure Reduction Devices: specialty bed utilized  Skin Protection: incontinence pads utilized  Activity Management: Rolling - L1  Head of Bed (HOB) Positioning: HOB at 30 degrees     Problem: Stroke, Ischemic (Includes Transient Ischemic Attack)  Goal: Optimal Cerebral Tissue Perfusion  Intervention: Protect and Optimize Cerebral Perfusion  Flowsheets (Taken 1/12/2025 0520)  Sensory Stimulation Regulation:   auditory stimulation minimized   care clustered   lighting decreased  Cerebral Perfusion Promotion:   blood pressure monitored   normothermia promoted  Stabilization Measures: airway opened

## 2025-01-12 NOTE — PLAN OF CARE
Bedside swallow assessment completed. Recommend continue regular texture diet with thin liquids given close supervision to clear pocketing on left side. ST to f/u to ensure tolerance and to complete cognitive-linguistic evaluation.  Waleska Ann CCC-SLP  1/12/2025  1:14 PM

## 2025-01-12 NOTE — ASSESSMENT & PLAN NOTE
Stroke risk-factor. A1C 6.1% on admission.    -goal 140 to 180 while admitted  -sliding scale insulin  -accuchecks

## 2025-01-12 NOTE — CONSULTS
Inpatient consult to Physical Medicine Rehab  Consult performed by: Rosa Alejandra NP  Consult ordered by: Delbert Zamora, JUAN-BC  Reason for consult: Rehab      Consult received.     CARLOS Bermeo, FNP-C  Physical Medicine & Rehabilitation   01/12/2025

## 2025-01-12 NOTE — SUBJECTIVE & OBJECTIVE
Neurologic Chief Complaint: left sided weakness and neglect    Subjective:     Interval History: Patient is seen for follow-up neurological assessment and treatment recommendations: see hospital course    HPI, Past Medical, Family, and Social History remains the same as documented in the initial encounter.     Review of Systems   Neurological:  Positive for weakness.     Scheduled Meds:   apixaban  5 mg Oral BID    atorvastatin  80 mg Oral QHS    clopidogreL  75 mg Oral Daily    fenofibrate  48 mg Oral Daily    fluticasone propionate  2 spray Each Nostril BID    hydrALAZINE  50 mg Oral Q12H    mupirocin   Nasal BID    pantoprazole  40 mg Oral Daily    polyethylene glycol  17 g Oral Daily    senna-docusate 8.6-50 mg  1 tablet Oral BID    tamsulosin  0.4 mg Oral Daily     Continuous Infusions:  PRN Meds:  Current Facility-Administered Medications:     acetaminophen, 650 mg, Oral, Q6H PRN    aluminum-magnesium hydroxide-simethicone, 30 mL, Oral, QID PRN    bisacodyL, 10 mg, Rectal, Daily PRN    carboxymethylcellulose sodium, 1 drop, Ophthalmic, QID PRN    hydrALAZINE, 10 mg, Intravenous, Q4H PRN    hydrocortisone, , Topical (Top), BID PRN    hydrOXYzine HCL, 25 mg, Oral, TID PRN    labetalol, 10 mg, Intravenous, Q4H PRN    ondansetron, 4 mg, Intravenous, Q4H PRN    oxyCODONE, 5 mg, Oral, Q4H PRN    sodium chloride 0.9%, 10 mL, Intravenous, PRN    Objective:     Vital Signs (Most Recent):  Temp: 98 °F (36.7 °C) (01/12/25 1501)  Pulse: 74 (01/12/25 1501)  Resp: 18 (01/12/25 1501)  BP: (!) 146/65 (01/12/25 1501)  SpO2: 95 % (01/12/25 1501)  BP Location: Right arm    Vital Signs Range (Last 24H):  Temp:  [97.7 °F (36.5 °C)-98.1 °F (36.7 °C)]   Pulse:  [49-82]   Resp:  [12-45]   BP: (107-191)/(53-84)   SpO2:  [90 %-100 %]   BP Location: Right arm       Physical Exam  Vitals and nursing note reviewed.   HENT:      Head: Normocephalic and atraumatic.      Mouth/Throat:      Mouth: Mucous membranes are moist.   Cardiovascular:  "     Rate and Rhythm: Normal rate and regular rhythm.   Pulmonary:      Effort: Pulmonary effort is normal.   Skin:     General: Skin is warm and dry.              Neurological Exam:   LOC: alert  Attention Span: poor  Language: No aphasia  Articulation: Dysarthria  Orientation: oriented to person, place (not hospital); disoriented to time ("75")  Visual Fields: Full  EOM (CN III, IV, VI): Full/intact  Pupils (CN II, III): PERRL  Facial Sensation (CN V): Normal  Facial Movement (CN VII): Lower facial weakness on the Left  Motor: Arm left  Paresis: 4/5  Leg left  Paresis: 4/5  Arm right  Normal 5/5  Leg right Normal 5/5  Cerebellum: optic ataxia  Sensation: Jan-hypoesthesia left and Tactile extinction to bilateral simultaneous stimulation     Left-sided neglect, optic ataxia, optic apraxia, simultagnosia    Laboratory:  CMP:   Recent Labs   Lab 01/12/25  0028   CALCIUM 9.2   ALBUMIN 3.2*   PROT 6.9      K 3.9   CO2 18*      BUN 17   CREATININE 0.9   ALKPHOS 30*   ALT 12   AST 22   BILITOT 1.1*     CBC:   Recent Labs   Lab 01/12/25  0028   WBC 7.50   RBC 4.95   HGB 13.2*   HCT 40.4      MCV 82   MCH 26.7*   MCHC 32.7     Lipid Panel:   Recent Labs   Lab 01/11/25  1733   CHOL 125   LDLCALC 82.4   HDL 27*   TRIG 78     Coagulation:   Recent Labs   Lab 01/11/25  1733   INR 1.2   APTT 29.5     Hgb A1C:   Recent Labs   Lab 01/11/25  1733   HGBA1C 6.1*     TSH:   Recent Labs   Lab 01/10/25  0328   TSH 8.716*       Diagnostic Results     Brain and Vessel imaging:  MRI Brain: 1/10/25  Bilateral posterior parietal cortical infarcts right greater than left.          MRA Head/Neck: 1/10/25  1. There is diminished flow signal and luminal narrowing in the distal MCA distribution bilaterally in the posterior parietal region in an area of infarction.  Vascular consultation and follow-up recommended.  CTA may better characterize.  2. There is bilateral flow signal loss in the distal vertebral arteries bilaterally " of indeterminate etiology.  This could be artifact.  High-grade narrowing/diminished flow of the distal V4 segment of the left vertebral artery is a possibility.  Mild narrowing or diminished flow of the V4 segment of the right vertebral artery is not excluded also.  Basilar artery demonstrates normal flow signal.  CTA may better characterize.  Vascular consultation and follow-up recommended.  3. Posterior cerebral arteries are patent proximally bilaterally.  P1 and P2 segments are adequately demonstrated.  Probable mild narrowing in the P2 segment on the left.. Possible mild luminal narrowing and diminished flow signal in the more distal components of the PCA distribution bilaterally.     CTA Head and Neck: 1/10  1. Bilateral parietal lobe regions of acute infarction, as seen on earlier MRI.  2. CTA head and neck with no evidence of high-grade stenosis, large vessel occlusion, or dissection.  3. Approximately 50% stenosis at the right ICA origin.  4. Indeterminate small bilateral parotid enhancing lesions.     CTA Head and Neck: 1/11  Evolving parieto-occipital infarcts again identified. Irregular calcified and soft plaque at the carotid bifurcations however less than 50% stenosis by NASCET criteria. At least moderate stenosis at the origin of the vertebral arteries bilaterally. Evaluation of the [id number] demonstrates a new right M2 branch occlusion, the occlusion is somewhat distal although the branch is quite large in size.     Cardiac Evaluation:   Echo: 1/10/25    Left Ventricle: The left ventricle is normal in size. Mildly increased wall thickness. There is mild concentric hypertrophy. There is normal systolic function with a visually estimated ejection fraction of 55 - 60%. Unable to assess diastolic function due to atrial fibrillation.    Right Ventricle: Systolic function is normal.    Aortic Valve: The aortic valve is a trileaflet valve. There is moderate aortic valve sclerosis. Mildly restricted  motion. There is mild stenosis. Aortic valve area by VTI is 1.9 cm². Aortic valve peak velocity is 1.5 m/s. Mean gradient is 5.3 mmHg. The dimensionless index is 0.59.    Tricuspid Valve: There is mild regurgitation.    Pulmonary Artery: The estimated pulmonary artery systolic pressure is 93 mmHg.    Pt in AFib throughout exam.

## 2025-01-13 LAB
ALBUMIN SERPL BCP-MCNC: 3.1 G/DL (ref 3.5–5.2)
ALP SERPL-CCNC: 30 U/L (ref 40–150)
ALT SERPL W/O P-5'-P-CCNC: 14 U/L (ref 10–44)
ANION GAP SERPL CALC-SCNC: 13 MMOL/L (ref 8–16)
AST SERPL-CCNC: 20 U/L (ref 10–40)
BASOPHILS # BLD AUTO: 0.02 K/UL (ref 0–0.2)
BASOPHILS NFR BLD: 0.3 % (ref 0–1.9)
BILIRUB SERPL-MCNC: 0.6 MG/DL (ref 0.1–1)
BSA FOR ECHO PROCEDURE: 1.98 M2
BUN SERPL-MCNC: 22 MG/DL (ref 8–23)
CALCIUM SERPL-MCNC: 9.2 MG/DL (ref 8.7–10.5)
CHLORIDE SERPL-SCNC: 104 MMOL/L (ref 95–110)
CO2 SERPL-SCNC: 17 MMOL/L (ref 23–29)
CREAT SERPL-MCNC: 1.3 MG/DL (ref 0.5–1.4)
CV ECHO LV RWT: 0.27 CM
DIFFERENTIAL METHOD BLD: ABNORMAL
ECHO EF ESTIMATED: 65 %
ECHO LV POSTERIOR WALL: 0.7 CM (ref 0.6–1.1)
EOSINOPHIL # BLD AUTO: 0 K/UL (ref 0–0.5)
EOSINOPHIL NFR BLD: 0 % (ref 0–8)
ERYTHROCYTE [DISTWIDTH] IN BLOOD BY AUTOMATED COUNT: 14.6 % (ref 11.5–14.5)
EST. GFR  (NO RACE VARIABLE): 57.3 ML/MIN/1.73 M^2
FRACTIONAL SHORTENING: 35.3 % (ref 28–44)
GLUCOSE SERPL-MCNC: 236 MG/DL (ref 70–110)
HCT VFR BLD AUTO: 39.5 % (ref 40–54)
HGB BLD-MCNC: 13.2 G/DL (ref 14–18)
IMM GRANULOCYTES # BLD AUTO: 0.04 K/UL (ref 0–0.04)
IMM GRANULOCYTES NFR BLD AUTO: 0.5 % (ref 0–0.5)
INTERVENTRICULAR SEPTUM: 0.9 CM (ref 0.6–1.1)
LEFT ATRIUM SIZE: 4.41 CM
LEFT INTERNAL DIMENSION IN SYSTOLE: 3.3 CM (ref 2.1–4)
LEFT VENTRICLE DIASTOLIC VOLUME INDEX: 64.25 ML/M2
LEFT VENTRICLE DIASTOLIC VOLUME: 125.28 ML
LEFT VENTRICLE MASS INDEX: 72 G/M2
LEFT VENTRICLE SYSTOLIC VOLUME INDEX: 22.5 ML/M2
LEFT VENTRICLE SYSTOLIC VOLUME: 43.86 ML
LEFT VENTRICULAR INTERNAL DIMENSION IN DIASTOLE: 5.1 CM (ref 3.5–6)
LEFT VENTRICULAR MASS: 140.5 G
LYMPHOCYTES # BLD AUTO: 0.7 K/UL (ref 1–4.8)
LYMPHOCYTES NFR BLD: 9.7 % (ref 18–48)
MAGNESIUM SERPL-MCNC: 2 MG/DL (ref 1.6–2.6)
MCH RBC QN AUTO: 27.2 PG (ref 27–31)
MCHC RBC AUTO-ENTMCNC: 33.4 G/DL (ref 32–36)
MCV RBC AUTO: 81 FL (ref 82–98)
MONOCYTES # BLD AUTO: 0.4 K/UL (ref 0.3–1)
MONOCYTES NFR BLD: 5.8 % (ref 4–15)
NEUTROPHILS # BLD AUTO: 6.4 K/UL (ref 1.8–7.7)
NEUTROPHILS NFR BLD: 83.7 % (ref 38–73)
NRBC BLD-RTO: 0 /100 WBC
PHOSPHATE SERPL-MCNC: 3.4 MG/DL (ref 2.7–4.5)
PLATELET # BLD AUTO: 235 K/UL (ref 150–450)
PMV BLD AUTO: 9.3 FL (ref 9.2–12.9)
POCT GLUCOSE: 148 MG/DL (ref 70–110)
POCT GLUCOSE: 168 MG/DL (ref 70–110)
POCT GLUCOSE: 191 MG/DL (ref 70–110)
POCT GLUCOSE: 267 MG/DL (ref 70–110)
POTASSIUM SERPL-SCNC: 4.1 MMOL/L (ref 3.5–5.1)
PROT SERPL-MCNC: 7 G/DL (ref 6–8.4)
RBC # BLD AUTO: 4.85 M/UL (ref 4.6–6.2)
SODIUM SERPL-SCNC: 134 MMOL/L (ref 136–145)
WBC # BLD AUTO: 7.63 K/UL (ref 3.9–12.7)
Z-SCORE OF LEFT VENTRICULAR DIMENSION IN END DIASTOLE: -0.86
Z-SCORE OF LEFT VENTRICULAR DIMENSION IN END SYSTOLE: -0.28

## 2025-01-13 PROCEDURE — 80053 COMPREHEN METABOLIC PANEL: CPT | Mod: HCNC

## 2025-01-13 PROCEDURE — 85025 COMPLETE CBC W/AUTO DIFF WBC: CPT | Mod: HCNC

## 2025-01-13 PROCEDURE — 25000003 PHARM REV CODE 250: Mod: HCNC

## 2025-01-13 PROCEDURE — 99232 SBSQ HOSP IP/OBS MODERATE 35: CPT | Mod: HCNC,,,

## 2025-01-13 PROCEDURE — 84100 ASSAY OF PHOSPHORUS: CPT | Mod: HCNC

## 2025-01-13 PROCEDURE — 99233 SBSQ HOSP IP/OBS HIGH 50: CPT | Mod: HCNC,GC,, | Performed by: STUDENT IN AN ORGANIZED HEALTH CARE EDUCATION/TRAINING PROGRAM

## 2025-01-13 PROCEDURE — 83735 ASSAY OF MAGNESIUM: CPT | Mod: HCNC

## 2025-01-13 PROCEDURE — 94761 N-INVAS EAR/PLS OXIMETRY MLT: CPT | Mod: HCNC

## 2025-01-13 PROCEDURE — 94799 UNLISTED PULMONARY SVC/PX: CPT | Mod: HCNC

## 2025-01-13 PROCEDURE — 63600175 PHARM REV CODE 636 W HCPCS: Mod: HCNC

## 2025-01-13 PROCEDURE — 99900035 HC TECH TIME PER 15 MIN (STAT): Mod: HCNC

## 2025-01-13 PROCEDURE — 97535 SELF CARE MNGMENT TRAINING: CPT | Mod: HCNC

## 2025-01-13 PROCEDURE — 20600001 HC STEP DOWN PRIVATE ROOM: Mod: HCNC

## 2025-01-13 PROCEDURE — 27100171 HC OXYGEN HIGH FLOW UP TO 24 HOURS: Mod: HCNC

## 2025-01-13 PROCEDURE — 92523 SPEECH SOUND LANG COMPREHEN: CPT | Mod: HCNC

## 2025-01-13 PROCEDURE — 25000242 PHARM REV CODE 250 ALT 637 W/ HCPCS: Mod: HCNC

## 2025-01-13 PROCEDURE — 25000003 PHARM REV CODE 250: Mod: HCNC | Performed by: PSYCHIATRY & NEUROLOGY

## 2025-01-13 RX ADMIN — HYDRALAZINE HYDROCHLORIDE 50 MG: 25 TABLET ORAL at 08:01

## 2025-01-13 RX ADMIN — ISOSORBIDE MONONITRATE 120 MG: 60 TABLET, EXTENDED RELEASE ORAL at 08:01

## 2025-01-13 RX ADMIN — SENNOSIDES AND DOCUSATE SODIUM 2 TABLET: 50; 8.6 TABLET ORAL at 08:01

## 2025-01-13 RX ADMIN — ACETAMINOPHEN 650 MG: 325 TABLET ORAL at 02:01

## 2025-01-13 RX ADMIN — TAMSULOSIN HYDROCHLORIDE 0.4 MG: 0.4 CAPSULE ORAL at 08:01

## 2025-01-13 RX ADMIN — CARBOXYMETHYLCELLULOSE SODIUM 1 DROP: 10 GEL OPHTHALMIC at 06:01

## 2025-01-13 RX ADMIN — MUPIROCIN: 20 OINTMENT TOPICAL at 09:01

## 2025-01-13 RX ADMIN — INSULIN ASPART 2 UNITS: 100 INJECTION, SOLUTION INTRAVENOUS; SUBCUTANEOUS at 07:01

## 2025-01-13 RX ADMIN — CARBOXYMETHYLCELLULOSE SODIUM 1 DROP: 10 GEL OPHTHALMIC at 09:01

## 2025-01-13 RX ADMIN — ATORVASTATIN CALCIUM 80 MG: 40 TABLET, FILM COATED ORAL at 08:01

## 2025-01-13 RX ADMIN — CLOPIDOGREL BISULFATE 75 MG: 75 TABLET ORAL at 08:01

## 2025-01-13 RX ADMIN — MUPIROCIN: 20 OINTMENT TOPICAL at 08:01

## 2025-01-13 RX ADMIN — APIXABAN 5 MG: 5 TABLET, FILM COATED ORAL at 08:01

## 2025-01-13 RX ADMIN — POLYETHYLENE GLYCOL 3350 17 G: 17 POWDER, FOR SOLUTION ORAL at 08:01

## 2025-01-13 RX ADMIN — OXYCODONE 5 MG: 5 TABLET ORAL at 04:01

## 2025-01-13 RX ADMIN — FENOFIBRATE 48 MG: 48 TABLET, FILM COATED ORAL at 08:01

## 2025-01-13 RX ADMIN — INSULIN ASPART 2 UNITS: 100 INJECTION, SOLUTION INTRAVENOUS; SUBCUTANEOUS at 02:01

## 2025-01-13 RX ADMIN — PANTOPRAZOLE SODIUM 40 MG: 20 TABLET, DELAYED RELEASE ORAL at 08:01

## 2025-01-13 RX ADMIN — SODIUM CHLORIDE, POTASSIUM CHLORIDE, SODIUM LACTATE AND CALCIUM CHLORIDE 500 ML: 600; 310; 30; 20 INJECTION, SOLUTION INTRAVENOUS at 05:01

## 2025-01-13 NOTE — ASSESSMENT & PLAN NOTE
73 y/o M PMH HTN, HLD, Afib on Eliquis (last received on 1/11 AM), CAD s/p CABG, CKD3, insulin dependent diabetes. Admitted to [facility] overnight 1/9 to 1/10 following a syncopal episode w/ fall and encephalopathy. Seen by tele-neurology. Recommended MRI (demonstrated bilateral MCA/PCA watershed infarcts), MRA (demonstrated diminished flow in the distal BL MCA), EEG (posterior slowing), and CTA head/neck (unrevealing on 1/10). Patient exhibited an acute change this morning around 10AM, described as left hemiplegia and dysarthria. Stroke code called, and repeat CTA demonstrated R M2 occlusion. Patient was transferred to [facility] for higher level of care. NIHSS on arrival of 8 (left-sided inattention/neglect, dysarthria, LUE drift). Taken for DSA which showed recanalization of R M2, no intervention performed.     Admit to [facility]  SD to stroke team yesterday  Vascular Neurology and Neuro IR consulted   Q4 neuro checks, vitals, I/Os  EKG and CXR  ECHO completed at OSH  ECHO at OSH showed PASP 93 without right heart strain  Contacted cardiology who believes the initial elevated PASP on OSH ECHO was inaccurate.  They reviewed the ECHO this morning, no significant wall motion abnormalities or right heart strain.   A1c, TSH, lipid panel, aPTT, PT/INR, T&S  CBC, CMP, Mag, and phos daily  SBP goal < 180  PRN labetalol and hydralazine  Statin   Restart Eliquis  Regular diet  PT/OT/SLP  VTE prophylaxis: mechanical, Eliquis  Will consider SD to stroke today pending repeat ECHO

## 2025-01-13 NOTE — PROGRESS NOTES
[facility] - Neuro Critical Care  Neurocritical Care  Progress Note    Admit Date: 1/9/2025  Service Date: 01/13/2025  Length of Stay: 3    Subjective:     Chief Complaint: 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    History of Present Illness: 73 y/o M PMH HTN, HLD, Afib on Eliquis (last received on 1/11 AM), CAD s/p CABG, CKD3, insulin dependent diabetes. He had a MIS Right L4-5 laminoforaminotomy 1/6 and was off of his AC/AP during that time. At home, after the procedure, he noticed R hand numbness/weakness and did not seek treatment. He was then admitted to [facility] overnight 1/9 following a syncopal episode w/ fall and encephalopathy. At the OSH, MRI demonstrated bilateral MCA/PCA infarcts, MRA showed diminished flow in the distal BL MCA, EEG showed posterior slowing, and CTA head/neck. Patient exhibited an acute change this morning around 10AM, described as left hemiplegia and dysarthria. Stroke code called, and repeat CTA demonstrated R M2 occlusion. [place] [facility] [place]. NIHSS on arrival of 8 (left-sided inattention/neglect, dysarthria, LUE drift). Taken for DSA which showed recanalization of R M2, no intervention performed. Admitted to [facility] for post-procedure monitoring and higher level of care.    Hospital Course: 1/12/2025: Solumedrol for itching post-contrast, worked well. Dc'd PE scan. ECHO reordered. SD to stroke.  1/13/2025: Contacted cardiology who believes the initial elevated PASP on OSH ECHO was inaccurate. They reviewed the ECHO this morning, no significant wall motion abnormalities or right heart strain.      Past Medical History:   Diagnosis Date    Chronic heart failure with preserved ejection fraction 2/9/2023    Chronic midline low back pain without sciatica 10/2/2017    Colon polyps     Coronary artery disease involving native coronary artery of native heart 3/5/2020    Coronary artery disease involving native coronary artery of native  heart with angina pectoris 12/10/2020    CVA (cerebral vascular accident) 1/11/2025    Diabetes mellitus with neurological manifestations, uncontrolled 1/24/2017    Diabetic polyneuropathy associated with type 2 diabetes mellitus 1/24/2017    Diabetic polyneuropathy associated with type 2 diabetes mellitus     Encephalopathy 1/10/2025    Essential hypertension 1/24/2017    Gastroesophageal reflux disease 1/24/2017    Hyperlipidemia 1/24/2017    Insomnia 1/24/2017    Long-term insulin use 1/24/2017    Longstanding persistent atrial fibrillation 12/30/2020    Lumbar spondylosis 11/13/2017    Nuclear sclerosis of both eyes 8/24/2017    Obesity     PAD (peripheral artery disease) 3/23/2022    Stage 3 chronic kidney disease 2/13/2019    Uncontrolled type 2 diabetes mellitus without complication, with long-term current use of insulin 1/24/2017     Past Surgical History:   Procedure Laterality Date    ARTHROSCOPIC REPAIR OF ROTATOR CUFF OF SHOULDER Right 7/5/2022    Procedure: REPAIR, ROTATOR CUFF, ARTHROSCOPIC;  Surgeon: Valerie Olivera MD;  Location: [facility] OR;  Service: Orthopedics;  Laterality: Right;  GUADALUPE AND NEPHJENNIFER LEMUS [phone]/ TEXTED ALLAN ON 6/23/2022 @ 9:47AM. ALLAN RESPONDED ON 6/23/2022 @ 10:33AM-  JEAN PAUL SALAZARTIER [phone] MY BE HERE FOR CASE SPOKE TO HIM @ 9:59AM ON 7-1-2022  RN PREOP 6/28/2022    BACK SURGERY      2002    CATARACT EXTRACTION W/  INTRAOCULAR LENS IMPLANT Right 08/29/2017    Dr. Hong    CATARACT EXTRACTION W/  INTRAOCULAR LENS IMPLANT Left 02/24/2022    Dr. Hong    CAUDAL EPIDURAL STEROID INJECTION N/A 9/25/2024    Procedure: Caudal Epidural Steroid Injection;  Surgeon: Eze Byrd Jr., MD;  Location: [facility] PAIN MANAGEMENT;  Service: Pain Management;  Laterality: N/A;  @1100(prev. 715)  Eliquis last 9/21  Plavix last 9/19  Check BG  E-Consent    COLONOSCOPY N/A 2/21/2017    Procedure: COLONOSCOPY;  Surgeon: Robb Rosado MD;  Location: [facility] ENDO;   Service: Endoscopy;  Laterality: N/A;    COLONOSCOPY N/A 7/5/2023    Procedure: COLONOSCOPY;  Surgeon: Aston Varela MD;  Location: [facility] ENDO;  Service: Endoscopy;  Laterality: N/A;  Referral: JOVANNI SCANLON  ok to hold Plavix 5 days and Eliquis 2 days per Dr Ford   Meds  Suprep   Inst portal   LW    CORONARY ANGIOGRAPHY INCLUDING BYPASS GRAFTS WITH CATHETERIZATION OF LEFT HEART N/A 11/11/2020    Procedure: ANGIOGRAM, CORONARY, INCLUDING BYPASS GRAFT, WITH LEFT HEART CATHETERIZATION;  Surgeon: Lane Cuellar MD;  Location: [facility] CATH LAB;  Service: Cardiology;  Laterality: N/A;  RN PRE OP 11-5-2020. --COVID NEGATIVE ON  11-. C A    CORONARY ARTERY BYPASS GRAFT      March 2016    EPIDURAL STEROID INJECTION Bilateral 11/14/2018    Procedure: Lumbar Medial Branch Blocks;  Surgeon: Eze Byrd Jr., MD;  Location: [facility] ENDO;  Service: Pain Management;  Laterality: Bilateral;  Bilateral Lumbar Medial Branch Blocks L2, L3, L4, L5    [id number]  [id number]    Arrive @ 1150; NO Sedation    EPIDURAL STEROID INJECTION Bilateral 11/28/2018    Procedure: Injection, Steroid, Epidural;  Surgeon: Eze Byrd Jr., MD;  Location: [facility] ENDO;  Service: Pain Management;  Laterality: Bilateral;  Bilateral Sacroiliac Joint Steroid Injections    45239    Arrive @ 0910    EPIDURAL STEROID INJECTION Bilateral 3/20/2019    Procedure: Injection, Steroid, Sacroiliiac Joint;  Surgeon: Eze Byrd Jr., MD;  Location: [facility] ENDO;  Service: Pain Management;  Laterality: Bilateral;  Bilateral Sacroiliac Joint Steroid Injections    12947    Arrive @ 1145; Trigger point injections also?    EPIDURAL STEROID INJECTION Right 8/2/2023    Procedure: Right L5 Transforaminal Epidural Steroid Injection;  Surgeon: Eze Byrd Jr., MD;  Location: [facility] ENDO;  Service: Pain Management;  Laterality: Right;  @0830 (given)  Eliquis last 7/29  Plavix last 7/27  Check BG    EPIDURAL STEROID INJECTION Right 10/11/2023     Procedure: Right L3 (infraneural) + S1 Transforaminal Epidural Steroid Injections;  Surgeon: Eze Byrd Jr., MD;  Location: [facility] ENDO;  Service: Pain Management;  Laterality: Right;  @0745 (requests morning)  Eliquis 10/7 & Plavix 10/  Check BG    ESOPHAGOGASTRODUODENOSCOPY N/A 2023    Procedure: EGD (ESOPHAGOGASTRODUODENOSCOPY);  Surgeon: Anita Oswald MD;  Location: [facility] ENDO;  Service: Endoscopy;  Laterality: N/A;  Procedure Timin-4 weeks  Provider: Any GI provider  Location: No Preference  Additional Scheduling Information: Blood thinners  Prep Specifications:N/A   ref. by Dr. Light, instr. to portal, ,  meds-st  ok to hold Plavix 5 days and Eliquis 2 day    INTRAOCULAR PROSTHESES INSERTION Left 2022    Procedure: INSERTION, IOL PROSTHESIS;  Surgeon: Nickolas Hong MD;  Location: [facility] OR;  Service: Ophthalmology;  Laterality: Left;    LAMINOFORAMINOTOMY OF SPINE USING MINIMALLY INVASIVE TECHNIQUE Right 2025    Procedure: LAMINOFORAMINOTOMY, SPINE, MINIMALLY INVASIVE;  Surgeon: Luis M Ellis MD;  Location: [facility] OR;  Service: Neurosurgery;  Laterality: Right;  Right L4/5 minimally invasive laminotomy/microdiscectomy. silvana table, lucio frame, fluoro, microscope, no vendor, no monitoring  CLEARED BY CARDS AND PCP   RN PREOP 24---T/S--done -----NEED ORDERS    PHACOEMULSIFICATION OF CATARACT Left 2022    Procedure: PHACOEMULSIFICATION, CATARACT;  Surgeon: Nickolas Hong MD;  Location: [facility] OR;  Service: Ophthalmology;  Laterality: Left;  RN Phone Pre Op 22.  Covid NEGATIVE  22.  Arrival 08:00 am.    TONSILLECTOMY        Current Facility-Administered Medications on File Prior to Encounter   Medication Dose Route Frequency Provider Last Rate Last Admin    cyclopentolate 1% ophthalmic solution 1 drop  1 drop Left Eye On Call Procedure Nickolas Hong MD   1 drop at 22 0901    ofloxacin 0.3 % ophthalmic solution 1  drop  1 drop Left Eye On Call Procedure Nickolas Hong MD   2 drop at 02/24/22 1017    sodium chloride 0.9% flush 10 mL  10 mL Intravenous PRN Nickolas Hong MD        triamcinolone acetonide injection 40 mg  40 mg Intra-articular  Valerie Olivera MD   40 mg at 02/09/23 1030     Current Outpatient Medications on File Prior to Encounter   Medication Sig Dispense Refill    HYDROcodone-acetaminophen (NORCO)  mg per tablet Take 1 tablet by mouth every 4 (four) hours as needed for Pain (7-10/10 pain). 42 tablet 0    acetaminophen (TYLENOL) 650 MG TbSR Take 650 mg by mouth every 8 (eight) hours.      albuterol (PROVENTIL/VENTOLIN HFA) 90 mcg/actuation inhaler Inhale 2 puffs into the lungs every 4 (four) hours as needed for Shortness of Breath. Rescue 17 g 3    apixaban (ELIQUIS) 5 mg Tab Take 1 tablet (5 mg total) by mouth 2 (two) times daily.      ascorbic acid, vitamin C, (VITAMIN C) 100 MG tablet Take 100 mg by mouth daily as needed.      atorvastatin (LIPITOR) 80 MG tablet TAKE 1 TABLET EVERY EVENING 90 tablet 3    b complex vitamins tablet Take 1 tablet by mouth once daily.      blood-glucose meter kit Test glucose 2-3x/day. True metrix 1 each 0    clopidogreL (PLAVIX) 75 mg tablet Take 75 mg by mouth once daily.      coenzyme Q10 100 mg capsule Take 1 capsule (100 mg total) by mouth once daily.      dapagliflozin propanediol (FARXIGA) 5 mg Tab tablet Take 1 tablet (5 mg total) by mouth once daily. 90 tablet 3    ergocalciferol (ERGOCALCIFEROL) 50,000 unit Cap TAKE 1 CAPSULE BY MOUTH WEEKLY 12 capsule 0    fenofibrate 160 MG Tab TAKE 1 TABLET EVERY MORNING 90 tablet 1    fluticasone propionate (FLONASE) 50 mcg/actuation nasal spray 2 sprays (100 mcg total) by Each Nostril route 2 (two) times daily. 18.2 mL 3    furosemide (LASIX) 20 MG tablet TAKE 1 TABLET TWICE DAILY 180 tablet 3    gabapentin (NEURONTIN) 300 MG capsule Take 2 capsules (600 mg total) by mouth 2 (two) times daily.       "glimepiride (AMARYL) 1 MG tablet TAKE 1 TABLET BEFORE BREAKFAST 90 tablet 3    hydrALAZINE (APRESOLINE) 50 MG tablet TAKE 1 TABLET TWICE DAILY 180 tablet 3    insulin degludec (TRESIBA FLEXTOUCH U-100) 100 unit/mL (3 mL) insulin pen Inject 20 Units into the skin once daily. 30 mL 4    isosorbide mononitrate (IMDUR) 120 MG 24 hr tablet Take 1 tablet (120 mg total) by mouth once daily. 90 tablet 3    lancets Misc Check blood glucose 2x/day. True metrix. E11.65 200 each 3    linaCLOtide (LINZESS) 145 mcg Cap capsule Take 1 capsule (145 mcg total) by mouth before breakfast. 30 capsule 0    meclizine (ANTIVERT) 25 mg tablet Take 1 tablet (25 mg total) by mouth 3 (three) times daily as needed for Dizziness (or at least one HS for 1 week when vertigo active). 60 tablet 12    metFORMIN (GLUCOPHAGE-XR) 500 MG ER 24hr tablet Take 1 tablet with breakfast and 2 tablets with supper. 270 tablet 12    metFORMIN (GLUCOPHAGE-XR) 500 MG ER 24hr tablet Take 1 tablet with breakfast and 2 tablets with supper. 270 tablet 2    methocarbamoL (ROBAXIN) 750 MG Tab Take 1 tablet (750 mg total) by mouth 3 (three) times daily as needed (muscle spasms). 60 tablet 0    nitroGLYCERIN (NITROSTAT) 0.4 MG SL tablet Place 1 tablet (0.4 mg total) under the tongue every 5 (five) minutes as needed for Chest pain. 25 tablet 11    omega-3 acid ethyl esters (LOVAZA) 1 gram capsule Take 2 capsules (2 g total) by mouth 2 (two) times daily.      ondansetron (ZOFRAN-ODT) 4 MG TbDL Dissolvxe 1 tablet (4 mg total) by mouth every 6 (six) hours as needed (nausea). 15 tablet 0    pantoprazole (PROTONIX) 40 MG tablet TAKE 1 TABLET EVERY DAY 90 tablet 3    pen needle, diabetic 32 gauge x 1/4" Ndle Use daily 200 each 3    semaglutide (OZEMPIC) 2 mg/dose (8 mg/3 mL) PnIj Inject 2 mg into the skin every 7 days. 4 each 3    triazolam (HALCION) 0.25 MG Tab TAKE 1 TABLET BY MOUTH EVERY NIGHT AT BEDTIME AS NEEDED 90 tablet 5      Allergies: Penicillins and Shrimp  Family " History   Problem Relation Name Age of Onset    Depression Mother      Heart disease Mother      Stroke Father      No Known Problems Sister Elaine     Diabetes Sister Dilcia     No Known Problems Sister Idalia     Blindness Brother Burt     No Known Problems Maternal Aunt      No Known Problems Maternal Uncle      No Known Problems Paternal Aunt      No Known Problems Paternal Uncle      No Known Problems Maternal Grandmother      No Known Problems Maternal Grandfather      No Known Problems Paternal Grandmother      No Known Problems Paternal Grandfather      Amblyopia Neg Hx      Cancer Neg Hx      Cataracts Neg Hx      Glaucoma Neg Hx      Hypertension Neg Hx      Macular degeneration Neg Hx      Retinal detachment Neg Hx      Strabismus Neg Hx      Thyroid disease Neg Hx       Social History     Tobacco Use    Smoking status: Never     Passive exposure: Never    Smokeless tobacco: Never   Substance Use Topics    Alcohol use: Not Currently    Drug use: No     Review of Systems   Constitutional:  Positive for fatigue.   HENT: Negative.     Eyes: Negative.    Respiratory: Negative.     Cardiovascular: Negative.    Gastrointestinal: Negative.    Endocrine: Negative.    Genitourinary: Negative.    Musculoskeletal: Negative.    Skin: Negative.    Allergic/Immunologic: Negative.    Neurological:         LUE numbness and weakness   Hematological: Negative.    Psychiatric/Behavioral: Negative.       Objective:     Vitals:    Temp: 97.9 °F (36.6 °C)  Pulse: 70  Rhythm: atrial rhythm  BP: (!) 164/68  MAP (mmHg): 98  Resp: (!) 23  SpO2: 98 %    Temp  Min: 97.6 °F (36.4 °C)  Max: 98.1 °F (36.7 °C)  Pulse  Min: 56  Max: 80  BP  Min: 101/47  Max: 209/88  MAP (mmHg)  Min: 68  Max: 127  Resp  Min: 13  Max: 28  SpO2  Min: 93 %  Max: 100 %    01/12 0701 - 01/13 0700  In: 792.6 [P.O.:300]  Out: 1500 [Urine:1500]   Unmeasured Output  Urine Occurrence: 1  Stool Occurrence: 1  Emesis Occurrence: 0  Pad Count: 0        Physical  Exam  Vitals and nursing note reviewed.   Constitutional:       General: He is not in acute distress.     Appearance: He is obese.   HENT:      Head: Normocephalic and atraumatic.      Nose: Nose normal.      Mouth/Throat:      Mouth: Mucous membranes are moist.      Pharynx: Oropharynx is clear.   Eyes:      Pupils: Pupils are equal, round, and reactive to light.   Cardiovascular:      Rate and Rhythm: Normal rate. Rhythm irregular.      Pulses: Normal pulses.   Pulmonary:      Effort: Pulmonary effort is normal.   Abdominal:      General: There is no distension.      Palpations: Abdomen is soft.      Tenderness: There is no abdominal tenderness.   Musculoskeletal:         General: No swelling.      Cervical back: Normal range of motion.   Skin:     General: Skin is warm and dry.      Capillary Refill: Capillary refill takes less than 2 seconds.   Neurological:      Mental Status: He is alert.      Comments:   E4 V5 M6  Alert. Oriented x4. Minimal dysarthria. No aphasia. Following commands.   PERRLA. EOMI. Visual fields intact   No facial asymmetry.  Tongue midline. Shoulder shrug symmetric.  Motor:  RUE 5/5  RLE 5/5  LUE 4/5  LLE 5/5  No drift   Coordination intact, slightly delayed movements  Sensation intact on R side, minimized on L side.   Psychiatric:         Mood and Affect: Mood normal.         Behavior: Behavior normal.         Thought Content: Thought content normal.         Judgment: Judgment normal.       Gait deferred.       Today I personally reviewed pertinent medications, lines/drains/airways, imaging, cardiology results, laboratory results, microbiology results,:    Assessment/Plan:     Neuro  Arterial ischemic stroke, multifocal, multiple vascular territories, acute  73 y/o M PMH HTN, HLD, Afib on Eliquis (last received on 1/11 AM), CAD s/p CABG, CKD3, insulin dependent diabetes. Admitted to [facility] overnight 1/9 to 1/10 following a syncopal episode w/ fall and encephalopathy. Seen by  tele-neurology. Recommended MRI (demonstrated bilateral MCA/PCA watershed infarcts), MRA (demonstrated diminished flow in the distal BL MCA), EEG (posterior slowing), and CTA head/neck (unrevealing on 1/10). Patient exhibited an acute change this morning around 10AM, described as left hemiplegia and dysarthria. Stroke code called, and repeat CTA demonstrated R M2 occlusion. Patient was transferred to [facility] for higher level of care. NIHSS on arrival of 8 (left-sided inattention/neglect, dysarthria, LUE drift). Taken for DSA which showed recanalization of R M2, no intervention performed.     Admit to [facility]  SD to stroke team yesterday  Vascular Neurology and Neuro IR consulted   Q4 neuro checks, vitals, I/Os  EKG and CXR  ECHO completed at OSH  ECHO at OSH showed PASP 93 without right heart strain  Contacted cardiology who believes the initial elevated PASP on OSH ECHO was inaccurate.  They reviewed the ECHO this morning, no significant wall motion abnormalities or right heart strain.   A1c, TSH, lipid panel, aPTT, PT/INR, T&S  CBC, CMP, Mag, and phos daily  SBP goal < 180  PRN labetalol and hydralazine  Statin   Restart Eliquis  Regular diet  PT/OT/SLP  VTE prophylaxis: mechanical, Eliquis  Will consider SD to stroke today pending repeat ECHO          The patient is being Prophylaxed for:  Venous Thromboembolism with: Mechanical or Chemical  Stress Ulcer with: Not Applicable   Ventilator Pneumonia with: not applicable    Activity Orders            Diet diabetic 2000 Calories (up to 75 gm per meal): Diabetic starting at 01/12 2310    Straight Cath starting at 01/12 0604    Progressive Mobility Protocol (mobilize patient to their highest level of functioning at least twice daily) starting at 01/11 2000    Turn patient starting at 01/11 1600    Elevate HOB starting at 01/11 1417          Full Code    JUAN Solis-BC  Neurocritical Care  [facility] - Neuro Critical Care

## 2025-01-13 NOTE — SUBJECTIVE & OBJECTIVE
Past Medical History:   Diagnosis Date    Chronic heart failure with preserved ejection fraction 2/9/2023    Chronic midline low back pain without sciatica 10/2/2017    Colon polyps     Coronary artery disease involving native coronary artery of native heart 3/5/2020    Coronary artery disease involving native coronary artery of native heart with angina pectoris 12/10/2020    CVA (cerebral vascular accident) 1/11/2025    Diabetes mellitus with neurological manifestations, uncontrolled 1/24/2017    Diabetic polyneuropathy associated with type 2 diabetes mellitus 1/24/2017    Diabetic polyneuropathy associated with type 2 diabetes mellitus     Encephalopathy 1/10/2025    Essential hypertension 1/24/2017    Gastroesophageal reflux disease 1/24/2017    Hyperlipidemia 1/24/2017    Insomnia 1/24/2017    Long-term insulin use 1/24/2017    Longstanding persistent atrial fibrillation 12/30/2020    Lumbar spondylosis 11/13/2017    Nuclear sclerosis of both eyes 8/24/2017    Obesity     PAD (peripheral artery disease) 3/23/2022    Stage 3 chronic kidney disease 2/13/2019    Uncontrolled type 2 diabetes mellitus without complication, with long-term current use of insulin 1/24/2017     Past Surgical History:   Procedure Laterality Date    ARTHROSCOPIC REPAIR OF ROTATOR CUFF OF SHOULDER Right 7/5/2022    Procedure: REPAIR, ROTATOR CUFF, ARTHROSCOPIC;  Surgeon: Valerie Olivera MD;  Location: [facility];  Service: Orthopedics;  Laterality: Right;  GUADALUPE AND NEPHJENNIFER LEMUS [phone]/ IRA LEMUS ON 6/23/2022 @ 9:47AM. ALLAN RESPONDED ON 6/23/2022 @ 10:33AM-  JEAN PAUL BABIN [phone] MY BE HERE FOR CASE SPOKE TO HIM @ 9:59AM ON 7-1-2022  RN PREOP 6/28/2022    BACK SURGERY      2002    CATARACT EXTRACTION W/  INTRAOCULAR LENS IMPLANT Right 08/29/2017    Dr. Hong    CATARACT EXTRACTION W/  INTRAOCULAR LENS IMPLANT Left 02/24/2022    Dr. Hong    CAUDAL EPIDURAL STEROID INJECTION N/A 9/25/2024    Procedure: Caudal  Epidural Steroid Injection;  Surgeon: Eze Byrd Jr., MD;  Location: [facility] PAIN MANAGEMENT;  Service: Pain Management;  Laterality: N/A;  @1100(prev. 715)  Eliquis last 9/21  Plavix last 9/19  Check BG  E-Consent    COLONOSCOPY N/A 2/21/2017    Procedure: COLONOSCOPY;  Surgeon: Robb Rosado MD;  Location: [facility] ENDO;  Service: Endoscopy;  Laterality: N/A;    COLONOSCOPY N/A 7/5/2023    Procedure: COLONOSCOPY;  Surgeon: Aston Varela MD;  Location: [facility] ENDO;  Service: Endoscopy;  Laterality: N/A;  Referral: JOVANNI SCANLON to hold Plavix 5 days and Eliquis 2 days per Dr Ford  [facility]  Suprep   Inst portal   LW    CORONARY ANGIOGRAPHY INCLUDING BYPASS GRAFTS WITH CATHETERIZATION OF LEFT HEART N/A 11/11/2020    Procedure: ANGIOGRAM, CORONARY, INCLUDING BYPASS GRAFT, WITH LEFT HEART CATHETERIZATION;  Surgeon: Lane Cuellar MD;  Location: [facility] CATH LAB;  Service: Cardiology;  Laterality: N/A;  RN PRE OP 11-5-2020. --COVID NEGATIVE ON  11-. C A    CORONARY ARTERY BYPASS GRAFT      March 2016    EPIDURAL STEROID INJECTION Bilateral 11/14/2018    Procedure: Lumbar Medial Branch Blocks;  Surgeon: Eze Byrd Jr., MD;  Location: [facility];  Service: Pain Management;  Laterality: Bilateral;  Bilateral Lumbar Medial Branch Blocks L2, L3, L4, L5    44420  [id number]    Arrive @ 1150; NO Sedation    EPIDURAL STEROID INJECTION Bilateral 11/28/2018    Procedure: Injection, Steroid, Epidural;  Surgeon: Eze Byrd Jr., MD;  Location: [facility];  Service: Pain Management;  Laterality: Bilateral;  Bilateral Sacroiliac Joint Steroid Injections    [id number]    Arrive @ 0910    EPIDURAL STEROID INJECTION Bilateral 3/20/2019    Procedure: Injection, Steroid, Sacroiliiac Joint;  Surgeon: Eze Byrd Jr., MD;  Location: [facility];  Service: Pain Management;  Laterality: Bilateral;  Bilateral Sacroiliac Joint Steroid Injections    [id number]    Arrive @ 1145; Trigger point injections also?     EPIDURAL STEROID INJECTION Right 2023    Procedure: Right L5 Transforaminal Epidural Steroid Injection;  Surgeon: Eez Byrd Jr., MD;  Location: [facility] ENDO;  Service: Pain Management;  Laterality: Right;  @0830 (given)  Eliquis last   Plavix last   Check BG    EPIDURAL STEROID INJECTION Right 10/11/2023    Procedure: Right L3 (infraneural) + S1 Transforaminal Epidural Steroid Injections;  Surgeon: Eze Byrd Jr., MD;  Location: [facility] ENDO;  Service: Pain Management;  Laterality: Right;  @0745 (requests morning)  Eliquis 10/7 & Plavix 10/5  Check BG    ESOPHAGOGASTRODUODENOSCOPY N/A 2023    Procedure: EGD (ESOPHAGOGASTRODUODENOSCOPY);  Surgeon: Anita Oswald MD;  Location: [facility] ENDO;  Service: Endoscopy;  Laterality: N/A;  Procedure Timin-4 weeks  Provider: Any GI provider  Location: No Preference  Additional Scheduling Information: Blood thinners  Prep Specifications:N/A   ref. by Dr. Light, instr. to portal, , [facility] meds-st  ok to hold Plavix 5 days and Eliquis 2 day    INTRAOCULAR PROSTHESES INSERTION Left 2022    Procedure: INSERTION, IOL PROSTHESIS;  Surgeon: Nickolas Hong MD;  Location: [facility] OR;  Service: Ophthalmology;  Laterality: Left;    LAMINOFORAMINOTOMY OF SPINE USING MINIMALLY INVASIVE TECHNIQUE Right 2025    Procedure: LAMINOFORAMINOTOMY, SPINE, MINIMALLY INVASIVE;  Surgeon: Luis M Ellis MD;  Location: [facility] OR;  Service: Neurosurgery;  Laterality: Right;  Right L4/5 minimally invasive laminotomy/microdiscectomy. silvana table, lucio frame, fluoro, microscope, no vendor, no monitoring  CLEARED BY CARDS AND PCP   RN PREOP 24---T/S--done -----NEED ORDERS    PHACOEMULSIFICATION OF CATARACT Left 2022    Procedure: PHACOEMULSIFICATION, CATARACT;  Surgeon: Nickolas Hong MD;  Location: [facility] OR;  Service: Ophthalmology;  Laterality: Left;  RN Phone Pre Op 22.  Covid NEGATIVE  22.  Arrival 08:00 am.     TONSILLECTOMY        Current Facility-Administered Medications on File Prior to Encounter   Medication Dose Route Frequency Provider Last Rate Last Admin    cyclopentolate 1% ophthalmic solution 1 drop  1 drop Left Eye On Call Procedure Nickolas Hong MD   1 drop at 02/24/22 0901    ofloxacin 0.3 % ophthalmic solution 1 drop  1 drop Left Eye On Call Procedure Nickolas Hong MD   2 drop at 02/24/22 1017    sodium chloride 0.9% flush 10 mL  10 mL Intravenous PRN Nickolas Hong MD        triamcinolone acetonide injection 40 mg  40 mg Intra-articular  Valerie Olivera MD   40 mg at 02/09/23 1030     Current Outpatient Medications on File Prior to Encounter   Medication Sig Dispense Refill    HYDROcodone-acetaminophen (NORCO)  mg per tablet Take 1 tablet by mouth every 4 (four) hours as needed for Pain (7-10/10 pain). 42 tablet 0    acetaminophen (TYLENOL) 650 MG TbSR Take 650 mg by mouth every 8 (eight) hours.      albuterol (PROVENTIL/VENTOLIN HFA) 90 mcg/actuation inhaler Inhale 2 puffs into the lungs every 4 (four) hours as needed for Shortness of Breath. Rescue 17 g 3    apixaban (ELIQUIS) 5 mg Tab Take 1 tablet (5 mg total) by mouth 2 (two) times daily.      ascorbic acid, vitamin C, (VITAMIN C) 100 MG tablet Take 100 mg by mouth daily as needed.      atorvastatin (LIPITOR) 80 MG tablet TAKE 1 TABLET EVERY EVENING 90 tablet 3    b complex vitamins tablet Take 1 tablet by mouth once daily.      blood-glucose meter kit Test glucose 2-3x/day. True metrix 1 each 0    clopidogreL (PLAVIX) 75 mg tablet Take 75 mg by mouth once daily.      coenzyme Q10 100 mg capsule Take 1 capsule (100 mg total) by mouth once daily.      dapagliflozin propanediol (FARXIGA) 5 mg Tab tablet Take 1 tablet (5 mg total) by mouth once daily. 90 tablet 3    ergocalciferol (ERGOCALCIFEROL) 50,000 unit Cap TAKE 1 CAPSULE BY MOUTH WEEKLY 12 capsule 0    fenofibrate 160 MG Tab TAKE 1 TABLET EVERY MORNING 90  tablet 1    fluticasone propionate (FLONASE) 50 mcg/actuation nasal spray 2 sprays (100 mcg total) by Each Nostril route 2 (two) times daily. 18.2 mL 3    furosemide (LASIX) 20 MG tablet TAKE 1 TABLET TWICE DAILY 180 tablet 3    gabapentin (NEURONTIN) 300 MG capsule Take 2 capsules (600 mg total) by mouth 2 (two) times daily.      glimepiride (AMARYL) 1 MG tablet TAKE 1 TABLET BEFORE BREAKFAST 90 tablet 3    hydrALAZINE (APRESOLINE) 50 MG tablet TAKE 1 TABLET TWICE DAILY 180 tablet 3    insulin degludec (TRESIBA FLEXTOUCH U-100) 100 unit/mL (3 mL) insulin pen Inject 20 Units into the skin once daily. 30 mL 4    isosorbide mononitrate (IMDUR) 120 MG 24 hr tablet Take 1 tablet (120 mg total) by mouth once daily. 90 tablet 3    lancets Misc Check blood glucose 2x/day. True metrix. E11.65 200 each 3    linaCLOtide (LINZESS) 145 mcg Cap capsule Take 1 capsule (145 mcg total) by mouth before breakfast. 30 capsule 0    meclizine (ANTIVERT) 25 mg tablet Take 1 tablet (25 mg total) by mouth 3 (three) times daily as needed for Dizziness (or at least one HS for 1 week when vertigo active). 60 tablet 12    metFORMIN (GLUCOPHAGE-XR) 500 MG ER 24hr tablet Take 1 tablet with breakfast and 2 tablets with supper. 270 tablet 12    metFORMIN (GLUCOPHAGE-XR) 500 MG ER 24hr tablet Take 1 tablet with breakfast and 2 tablets with supper. 270 tablet 2    methocarbamoL (ROBAXIN) 750 MG Tab Take 1 tablet (750 mg total) by mouth 3 (three) times daily as needed (muscle spasms). 60 tablet 0    nitroGLYCERIN (NITROSTAT) 0.4 MG SL tablet Place 1 tablet (0.4 mg total) under the tongue every 5 (five) minutes as needed for Chest pain. 25 tablet 11    omega-3 acid ethyl esters (LOVAZA) 1 gram capsule Take 2 capsules (2 g total) by mouth 2 (two) times daily.      ondansetron (ZOFRAN-ODT) 4 MG TbDL Dissolvxe 1 tablet (4 mg total) by mouth every 6 (six) hours as needed (nausea). 15 tablet 0    pantoprazole (PROTONIX) 40 MG tablet TAKE 1 TABLET EVERY DAY  "90 tablet 3    pen needle, diabetic 32 gauge x 1/4" Ndle Use daily 200 each 3    semaglutide (OZEMPIC) 2 mg/dose (8 mg/3 mL) PnIj Inject 2 mg into the skin every 7 days. 4 each 3    triazolam (HALCION) 0.25 MG Tab TAKE 1 TABLET BY MOUTH EVERY NIGHT AT BEDTIME AS NEEDED 90 tablet 5      Allergies: Penicillins and Shrimp  Family History   Problem Relation Name Age of Onset    Depression Mother      Heart disease Mother      Stroke Father      No Known Problems Sister Elaine     Diabetes Sister Dilcia     No Known Problems Sister Idalia     Blindness Brother Burt     No Known Problems Maternal Aunt      No Known Problems Maternal Uncle      No Known Problems Paternal Aunt      No Known Problems Paternal Uncle      No Known Problems Maternal Grandmother      No Known Problems Maternal Grandfather      No Known Problems Paternal Grandmother      No Known Problems Paternal Grandfather      Amblyopia Neg Hx      Cancer Neg Hx      Cataracts Neg Hx      Glaucoma Neg Hx      Hypertension Neg Hx      Macular degeneration Neg Hx      Retinal detachment Neg Hx      Strabismus Neg Hx      Thyroid disease Neg Hx       Social History     Tobacco Use    Smoking status: Never     Passive exposure: Never    Smokeless tobacco: Never   Substance Use Topics    Alcohol use: Not Currently    Drug use: No     Review of Systems   Constitutional:  Positive for fatigue.   HENT: Negative.     Eyes: Negative.    Respiratory: Negative.     Cardiovascular: Negative.    Gastrointestinal: Negative.    Endocrine: Negative.    Genitourinary: Negative.    Musculoskeletal: Negative.    Skin: Negative.    Allergic/Immunologic: Negative.    Neurological:         LUE numbness and weakness   Hematological: Negative.    Psychiatric/Behavioral: Negative.       Objective:     Vitals:    Temp: 97.9 °F (36.6 °C)  Pulse: 70  Rhythm: atrial rhythm  BP: (!) 164/68  MAP (mmHg): 98  Resp: (!) 23  SpO2: 98 %    Temp  Min: 97.6 °F (36.4 °C)  Max: 98.1 °F (36.7 " °C)  Pulse  Min: 56  Max: 80  BP  Min: 101/47  Max: 209/88  MAP (mmHg)  Min: 68  Max: 127  Resp  Min: 13  Max: 28  SpO2  Min: 93 %  Max: 100 %    01/12 0701 - 01/13 0700  In: 792.6 [P.O.:300]  Out: 1500 [Urine:1500]   Unmeasured Output  Urine Occurrence: 1  Stool Occurrence: 1  Emesis Occurrence: 0  Pad Count: 0        Physical Exam  Vitals and nursing note reviewed.   Constitutional:       General: He is not in acute distress.     Appearance: He is obese.   HENT:      Head: Normocephalic and atraumatic.      Nose: Nose normal.      Mouth/Throat:      Mouth: Mucous membranes are moist.      Pharynx: Oropharynx is clear.   Eyes:      Pupils: Pupils are equal, round, and reactive to light.   Cardiovascular:      Rate and Rhythm: Normal rate. Rhythm irregular.      Pulses: Normal pulses.   Pulmonary:      Effort: Pulmonary effort is normal.   Abdominal:      General: There is no distension.      Palpations: Abdomen is soft.      Tenderness: There is no abdominal tenderness.   Musculoskeletal:         General: No swelling.      Cervical back: Normal range of motion.   Skin:     General: Skin is warm and dry.      Capillary Refill: Capillary refill takes less than 2 seconds.   Neurological:      Mental Status: He is alert.      Comments:   E4 V5 M6  Alert. Oriented x4. Minimal dysarthria. No aphasia. Following commands.   PERRLA. EOMI. Visual fields intact   No facial asymmetry.  Tongue midline. Shoulder shrug symmetric.  Motor:  RUE 5/5  RLE 5/5  LUE 4/5  LLE 5/5  No drift   Coordination intact, slightly delayed movements  Sensation intact on R side, minimized on L side.   Psychiatric:         Mood and Affect: Mood normal.         Behavior: Behavior normal.         Thought Content: Thought content normal.         Judgment: Judgment normal.       Gait deferred.       Today I personally reviewed pertinent medications, lines/drains/airways, imaging, cardiology results, laboratory results, microbiology results,:

## 2025-01-13 NOTE — PT/OT/SLP EVAL
Speech Language Pathology Evaluation  Cognitive Communication    Patient Name:  Driss Hernandez Jr.   MRN:  [id number]  Admitting Diagnosis: 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    Recommendations:     Recommendations:                General Recommendations:  Cognitive-linguistic therapy  Diet recommendations:  Regular Diet - IDDSI Level 7, Thin liquids - IDDSI Level 0   Aspiration Precautions: Assistance with meals, Avoid talking while eating, Check for pocketing/oral residue, HOB to 90 degrees, Monitor for s/s of aspiration, and Strict aspiration precautions   General Precautions: Standard, aspiration, fall  Communication strategies:  provide increased time to answer and go to room if call light pushed    Assessment:     Driss Hernandez Jr. is a 74 y.o. male with an SLP diagnosis of Dysphagia and Cognitive-Linguistic Impairment.      History:     Past Medical History:   Diagnosis Date    Chronic heart failure with preserved ejection fraction 2/9/2023    Chronic midline low back pain without sciatica 10/2/2017    Colon polyps     Coronary artery disease involving native coronary artery of native heart 3/5/2020    Coronary artery disease involving native coronary artery of native heart with angina pectoris 12/10/2020    CVA (cerebral vascular accident) 1/11/2025    Diabetes mellitus with neurological manifestations, uncontrolled 1/24/2017    Diabetic polyneuropathy associated with type 2 diabetes mellitus 1/24/2017    Diabetic polyneuropathy associated with type 2 diabetes mellitus     Encephalopathy 1/10/2025    Essential hypertension 1/24/2017    Gastroesophageal reflux disease 1/24/2017    Hyperlipidemia 1/24/2017    Insomnia 1/24/2017    Long-term insulin use 1/24/2017    Longstanding persistent atrial fibrillation 12/30/2020    Lumbar spondylosis 11/13/2017    Nuclear sclerosis of both eyes 8/24/2017    Obesity     PAD (peripheral artery disease) 3/23/2022    Stage 3 chronic kidney  disease 2/13/2019    Uncontrolled type 2 diabetes mellitus without complication, with long-term current use of insulin 1/24/2017       Past Surgical History:   Procedure Laterality Date    ARTHROSCOPIC REPAIR OF ROTATOR CUFF OF SHOULDER Right 7/5/2022    Procedure: REPAIR, ROTATOR CUFF, ARTHROSCOPIC;  Surgeon: Valerie Olivera MD;  Location: [facility] OR;  Service: Orthopedics;  Laterality: Right;  HETAL LEMUS [phone]/ TEXTED ALLAN ON 6/23/2022 @ 9:47AM. ALLAN RESPONDED ON 6/23/2022 @ 10:33AM-LO  JEAN PAUL BABIN [phone] MY BE HERE FOR CASE SPOKE TO HIM @ 9:59AM ON 7-1-2022  RN PREOP 6/28/2022    BACK SURGERY      2002    CATARACT EXTRACTION W/  INTRAOCULAR LENS IMPLANT Right 08/29/2017    Dr. Hong    CATARACT EXTRACTION W/  INTRAOCULAR LENS IMPLANT Left 02/24/2022    Dr. Hong    CAUDAL EPIDURAL STEROID INJECTION N/A 9/25/2024    Procedure: Caudal Epidural Steroid Injection;  Surgeon: Eze Byrd Jr., MD;  Location: [facility] PAIN MANAGEMENT;  Service: Pain Management;  Laterality: N/A;  @1100(prev. 715)  Eliquis last 9/21  Plavix last 9/19  Check BG  E-Consent    COLONOSCOPY N/A 2/21/2017    Procedure: COLONOSCOPY;  Surgeon: Robb Rosado MD;  Location: [facility] ENDO;  Service: Endoscopy;  Laterality: N/A;    COLONOSCOPY N/A 7/5/2023    Procedure: COLONOSCOPY;  Surgeon: Aston Varela MD;  Location: [facility] ENDO;  Service: Endoscopy;  Laterality: N/A;  Referral: JOVANNI SCANLON to hold Plavix 5 days and Eliquis 2 days per Dr Purdy-OFELIA   Meds  Suprep   Inst portal   LW    CORONARY ANGIOGRAPHY INCLUDING BYPASS GRAFTS WITH CATHETERIZATION OF LEFT HEART N/A 11/11/2020    Procedure: ANGIOGRAM, CORONARY, INCLUDING BYPASS GRAFT, WITH LEFT HEART CATHETERIZATION;  Surgeon: Lane Cuellar MD;  Location: [facility] CATH LAB;  Service: Cardiology;  Laterality: N/A;  RN PRE OP 11-5-2020. --COVID NEGATIVE ON  11-. C A    CORONARY ARTERY BYPASS GRAFT      March 2016    EPIDURAL STEROID  INJECTION Bilateral 2018    Procedure: Lumbar Medial Branch Blocks;  Surgeon: Eze Byrd Jr., MD;  Location: [facility] [place] ENDO;  Service: Pain Management;  Laterality: Bilateral;  Bilateral Lumbar Medial Branch Blocks L2, L3, L4, L5    34839  [id number]    Arrive @ 1150; NO Sedation    EPIDURAL STEROID INJECTION Bilateral 2018    Procedure: Injection, Steroid, Epidural;  Surgeon: Eze Byrd Jr., MD;  Location: [facility] [place] ENDO;  Service: Pain Management;  Laterality: Bilateral;  Bilateral Sacroiliac Joint Steroid Injections    80565    Arrive @ 0910    EPIDURAL STEROID INJECTION Bilateral 3/20/2019    Procedure: Injection, Steroid, Sacroiliiac Joint;  Surgeon: Eze Byrd Jr., MD;  Location: [facility] [place] ENDO;  Service: Pain Management;  Laterality: Bilateral;  Bilateral Sacroiliac Joint Steroid Injections    50494    Arrive @ 1145; Trigger point injections also?    EPIDURAL STEROID INJECTION Right 2023    Procedure: Right L5 Transforaminal Epidural Steroid Injection;  Surgeon: Eze Byrd Jr., MD;  Location: [facility] [place] ENDO;  Service: Pain Management;  Laterality: Right;  @0830 (given)  Eliquis last   Plavix last   Check BG    EPIDURAL STEROID INJECTION Right 10/11/2023    Procedure: Right L3 (infraneural) + S1 Transforaminal Epidural Steroid Injections;  Surgeon: Eze Byrd Jr., MD;  Location: [facility];  Service: Pain Management;  Laterality: Right;  @0745 (requests morning)  Eliquis 10/7 & Plavix 10/5  Check BG    ESOPHAGOGASTRODUODENOSCOPY N/A 2023    Procedure: EGD (ESOPHAGOGASTRODUODENOSCOPY);  Surgeon: Anita Oswald MD;  Location: [facility];  Service: Endoscopy;  Laterality: N/A;  Procedure Timin-4 weeks  Provider: Any GI provider  Location: No Preference  Additional Scheduling Information: Blood thinners  Prep Specifications:N/A   ref. by Dr. Light, instr. to [place], ,  meds-st  ok to hold Plavix 5 days and Eliquis 2 day    INTRAOCULAR PROSTHESES  INSERTION Left 2/24/2022    Procedure: INSERTION, IOL PROSTHESIS;  Surgeon: Nickolas Hong MD;  Location: [facility] OR;  Service: Ophthalmology;  Laterality: Left;    LAMINOFORAMINOTOMY OF SPINE USING MINIMALLY INVASIVE TECHNIQUE Right 1/6/2025    Procedure: LAMINOFORAMINOTOMY, SPINE, MINIMALLY INVASIVE;  Surgeon: Luis M Ellis MD;  Location: [facility] OR;  Service: Neurosurgery;  Laterality: Right;  Right L4/5 minimally invasive laminotomy/microdiscectomy. silvana table, lucio frame, fluoro, microscope, no vendor, no monitoring  CLEARED BY CARDS AND PCP   RN PREOP 12/30/24---T/S--done 12/30-----NEED ORDERS    PHACOEMULSIFICATION OF CATARACT Left 2/24/2022    Procedure: PHACOEMULSIFICATION, CATARACT;  Surgeon: Nickolas Hong MD;  Location: [facility] OR;  Service: Ophthalmology;  Laterality: Left;  RN Phone Pre Op 2-16-22.  Covid NEGATIVE  2-23-22.  Arrival 08:00 am.    TONSILLECTOMY       History of Present Illness: 75 y/o M PMH HTN, HLD, Afib on Eliquis (last received on 1/11 AM), CAD s/p CABG, CKD3, insulin dependent diabetes. He had a MIS Right L4-5 laminoforaminotomy 1/6 and was off of his AC/AP during that time. At home, after the procedure, he noticed R hand numbness/weakness and did not seek treatment. He was then admitted to [facility] overnight 1/9 following a syncopal episode w/ fall and encephalopathy. At the OSH, MRI demonstrated bilateral MCA/PCA infarcts, MRA showed diminished flow in the distal BL MCA, EEG showed posterior slowing, and CTA head/neck. Patient exhibited an acute change this morning around 10AM, described as left hemiplegia and dysarthria. Stroke code called, and repeat CTA demonstrated R M2 occlusion. Patient was transferred to [facility] for higher level of care. NIHSS on arrival of 8 (left-sided inattention/neglect, dysarthria, LUE drift). Taken for DSA which showed recanalization of R M2, no intervention performed. Admitted to [facility] for post-procedure monitoring and higher level  "of care.     Occupation/hobbies/homemaking: Pt retired from an engineering firm. He currently drives and was IND, but his wife assisted with managing medications.     Subjective     "I can't recall things."     Pain/Comfort:  Pain Rating 1: 0/10    Respiratory Status: Room air    Objective:     Cognitive Status:    Pt was oriented to place and situation. External aid used to oriented to year. External aid and max cues were needed to orient to month. Min cues were needed to complete general recall tasks. Pt immediately recalled series of 3, 4, and 5 digits with 100% accuracy. Following a 2 minute delay, pt recalled 2/3 unrelated words given cues (1/3 IND). Pt answered 2/3 problem solving q's correctly. Min cues were needed to generate multiple causes for a hypothetical problem.         Receptive Language:   Comprehension:      Pt answered complex yes/no q's and followed 1-, 2-, and 3-step commands with 100% accuracy.     Expressive Language:  Verbal:  Pt able to express basic and functional information without significant expressive language deficits. During a word fluency task, pt listed 10 items in a category in one minute given min cues (15-20 is considered wnl).       Motor Speech:  WFL    Voice:   WFL    Visual-Spatial:  tba    Reading:   tba      Written Expression:   tba    Treatment:   RN reported pocketing in left buccal cavity with PO intake, but given cues to utilize compensatory strategies, pt was able to clear.  SLP performed another oral [facility] exam.  No significant left facial droop was appreciated, but SLP suspects decreased sensory awareness may contribute to risks of pocketing. Will continue to monitor and reinforce compensatory strategies.     Education is provided to pt and wife regarding role of SLP, purpose of speech/language/cognitive evaluation, impressions/results, cognitive tx goals, decreased sensory awareness, risks of pocketing, strategies for managing pocketing, rehabilitation s/p stroke, " and SLP treatment plan and POC.  Pt demonstrated understanding of education provided, but will benefit from continued reinforcement.       Goals:   Multidisciplinary Problems       SLP Goals          Problem: SLP    Goal Priority Disciplines Outcome   SLP Goal     SLP    Description: Goals expected to be met by 1/19:  1. Pt will tolerate least restrictive diet without overt s/sx airway threat.    2. Pt will participate in cognitive-linguistic evaluation to further develop POC. Goal met/initiated 1/13:  ADDITIONAL GOALS:   2. Pt will O x 4 given mod-max cues and/or external aid.  3. Pt will recall general information with 80% accuracy given min cues.   4. Pt will recall 3/3 items after a 2 minute delay given mod-max cues.   5. Pt will complete problem solving/reasoning tasks with 60% accuracy given mod cues.   6. Pt will list an average of 11 items in a category in one minute given min cues.                            Plan:   Patient to be seen:  4 x/week   Plan of Care expires:  02/10/25  Plan of Care reviewed with:  patient, spouse   SLP Follow-Up:  Yes       Discharge recommendations:  Therapy Intensity Recommendations at Discharge: High Intensity Therapy       Time Tracking:     SLP Treatment Date:   01/13/25  Speech Start Time:  1552  Speech Stop Time:  1609     Speech Total Time (min):  17 min    Billable Minutes: Eval 9  and Self Care/Home Management Training 8    01/13/2025

## 2025-01-13 NOTE — PLAN OF CARE
01/13/25 1459   Post-Acute Status   Post-Acute Authorization Placement   Post-Acute Placement Status Referrals Sent   Hospital Resources/Appts/Education Provided Provided patient/caregiver with written discharge plan information   Patient choice form signed by patient/caregiver List with quality metrics by geographic area provided   Discharge Delays None known at this time   Discharge Plan   Discharge Plan A Rehab   Discharge Plan B Home with family     Met with pt wife and daughter  to review discharge recommendation of Rehab  and is agreeable to plan    Patient/family provided list of facilities in-network with patient's payor plan. Providers that are owned, operated, or affiliated with [facility] are included on the list.     Notified that referral sent to below listed facilities from in-network list based on proximity to home/family support:   [facility]   2. [facility]  3.  4.  5. (can send more than 5)    Patient/family instructed to identify preference.    Preferred Facility: (if more than 1, listed in order of descending preference)  [facility]     If an additional preferred facility not listed above is identified, additional referral to be sent. If above facilities unable to accept, will send additional referrals to in-network providers.     Discharge Plan A and Plan B have been determined by review of patient's clinical status, future medical and therapeutic needs, and coverage/benefits for post-acute care in coordination with multidisciplinary team members.    Donna Bell MSW, AMANW  [facility]  Case Management Dept.

## 2025-01-13 NOTE — PROGRESS NOTES
Neurosurgery      Subjective:     Interval history:  Transferred to [facility] after acute R M2 occlusion yesterday. Was found on DSA to have recanalized   Patient without complaint this evening  Denies back or leg pain.  Back on eliquis and plavix.    PTA Medications   Medication Sig    HYDROcodone-acetaminophen (NORCO)  mg per tablet Take 1 tablet by mouth every 4 (four) hours as needed for Pain (7-10/10 pain).    acetaminophen (TYLENOL) 650 MG TbSR Take 650 mg by mouth every 8 (eight) hours.    albuterol (PROVENTIL/VENTOLIN HFA) 90 mcg/actuation inhaler Inhale 2 puffs into the lungs every 4 (four) hours as needed for Shortness of Breath. Rescue    apixaban (ELIQUIS) 5 mg Tab Take 1 tablet (5 mg total) by mouth 2 (two) times daily.    ascorbic acid, vitamin C, (VITAMIN C) 100 MG tablet Take 100 mg by mouth daily as needed.    atorvastatin (LIPITOR) 80 MG tablet TAKE 1 TABLET EVERY EVENING    b complex vitamins tablet Take 1 tablet by mouth once daily.    blood-glucose meter kit Test glucose 2-3x/day. True metrix    clopidogreL (PLAVIX) 75 mg tablet Take 75 mg by mouth once daily.    coenzyme Q10 100 mg capsule Take 1 capsule (100 mg total) by mouth once daily.    dapagliflozin propanediol (FARXIGA) 5 mg Tab tablet Take 1 tablet (5 mg total) by mouth once daily.    ergocalciferol (ERGOCALCIFEROL) 50,000 unit Cap TAKE 1 CAPSULE BY MOUTH WEEKLY    fenofibrate 160 MG Tab TAKE 1 TABLET EVERY MORNING    fluticasone propionate (FLONASE) 50 mcg/actuation nasal spray 2 sprays (100 mcg total) by Each Nostril route 2 (two) times daily.    furosemide (LASIX) 20 MG tablet TAKE 1 TABLET TWICE DAILY    gabapentin (NEURONTIN) 300 MG capsule Take 2 capsules (600 mg total) by mouth 2 (two) times daily.    glimepiride (AMARYL) 1 MG tablet TAKE 1 TABLET BEFORE BREAKFAST    hydrALAZINE (APRESOLINE) 50 MG tablet TAKE 1 TABLET TWICE DAILY    insulin degludec (TRESIBA FLEXTOUCH U-100) 100 unit/mL (3 mL) insulin pen Inject 20 Units into  "the skin once daily.    isosorbide mononitrate (IMDUR) 120 MG 24 hr tablet Take 1 tablet (120 mg total) by mouth once daily.    lancets Misc Check blood glucose 2x/day. True metrix. E11.65    linaCLOtide (LINZESS) 145 mcg Cap capsule Take 1 capsule (145 mcg total) by mouth before breakfast.    meclizine (ANTIVERT) 25 mg tablet Take 1 tablet (25 mg total) by mouth 3 (three) times daily as needed for Dizziness (or at least one HS for 1 week when vertigo active).    metFORMIN (GLUCOPHAGE-XR) 500 MG ER 24hr tablet Take 1 tablet with breakfast and 2 tablets with supper.    metFORMIN (GLUCOPHAGE-XR) 500 MG ER 24hr tablet Take 1 tablet with breakfast and 2 tablets with supper.    methocarbamoL (ROBAXIN) 750 MG Tab Take 1 tablet (750 mg total) by mouth 3 (three) times daily as needed (muscle spasms).    nitroGLYCERIN (NITROSTAT) 0.4 MG SL tablet Place 1 tablet (0.4 mg total) under the tongue every 5 (five) minutes as needed for Chest pain.    omega-3 acid ethyl esters (LOVAZA) 1 gram capsule Take 2 capsules (2 g total) by mouth 2 (two) times daily.    ondansetron (ZOFRAN-ODT) 4 MG TbDL Dissolvxe 1 tablet (4 mg total) by mouth every 6 (six) hours as needed (nausea).    pantoprazole (PROTONIX) 40 MG tablet TAKE 1 TABLET EVERY DAY    pen needle, diabetic 32 gauge x 1/4" Ndle Use daily    semaglutide (OZEMPIC) 2 mg/dose (8 mg/3 mL) PnIj Inject 2 mg into the skin every 7 days.    triazolam (HALCION) 0.25 MG Tab TAKE 1 TABLET BY MOUTH EVERY NIGHT AT BEDTIME AS NEEDED       Review of patient's allergies indicates:   Allergen Reactions    Penicillins Other (See Comments)     Unknown reaction, Had a reaction as a child    Shrimp Itching     Hand Itching       Past Medical History:   Diagnosis Date    Chronic heart failure with preserved ejection fraction 2/9/2023    Chronic midline low back pain without sciatica 10/2/2017    Colon polyps     Coronary artery disease involving native coronary artery of native heart 3/5/2020    " Coronary artery disease involving native coronary artery of native heart with angina pectoris 12/10/2020    CVA (cerebral vascular accident) 1/11/2025    Diabetes mellitus with neurological manifestations, uncontrolled 1/24/2017    Diabetic polyneuropathy associated with type 2 diabetes mellitus 1/24/2017    Diabetic polyneuropathy associated with type 2 diabetes mellitus     Encephalopathy 1/10/2025    Essential hypertension 1/24/2017    Gastroesophageal reflux disease 1/24/2017    Hyperlipidemia 1/24/2017    Insomnia 1/24/2017    Long-term insulin use 1/24/2017    Longstanding persistent atrial fibrillation 12/30/2020    Lumbar spondylosis 11/13/2017    Nuclear sclerosis of both eyes 8/24/2017    Obesity     PAD (peripheral artery disease) 3/23/2022    Stage 3 chronic kidney disease 2/13/2019    Uncontrolled type 2 diabetes mellitus without complication, with long-term current use of insulin 1/24/2017     Past Surgical History:   Procedure Laterality Date    ARTHROSCOPIC REPAIR OF ROTATOR CUFF OF SHOULDER Right 7/5/2022    Procedure: REPAIR, ROTATOR CUFF, ARTHROSCOPIC;  Surgeon: Valerie Olivera MD;  Location: [facility] OR;  Service: Orthopedics;  Laterality: Right;  GUADALUPE AND NEPHJENNIFER LEMUS [phone]/ TEXTED ALLAN ON 6/23/2022 @ 9:47AM. ALLAN RESPONDED ON 6/23/2022 @ 10:33AM-  JEAN PAUL TALYA [phone] MY BE HERE FOR CASE SPOKE TO HIM @ 9:59AM ON 7-1-2022  RN PREOP 6/28/2022    BACK SURGERY      2002    CATARACT EXTRACTION W/  INTRAOCULAR LENS IMPLANT Right 08/29/2017    Dr. Hong    CATARACT EXTRACTION W/  INTRAOCULAR LENS IMPLANT Left 02/24/2022    Dr. Hong    CAUDAL EPIDURAL STEROID INJECTION N/A 9/25/2024    Procedure: Caudal Epidural Steroid Injection;  Surgeon: Eze Byrd Jr., MD;  Location: [facility] PAIN MANAGEMENT;  Service: Pain Management;  Laterality: N/A;  @1100(prev. 715)  Eliquis last 9/21  Plavix last 9/19  Check BG  E-Consent    COLONOSCOPY N/A 2/21/2017    Procedure:  COLONOSCOPY;  Surgeon: Robb Rosado MD;  Location: [facility] ENDO;  Service: Endoscopy;  Laterality: N/A;    COLONOSCOPY N/A 7/5/2023    Procedure: COLONOSCOPY;  Surgeon: Aston Varela MD;  Location: [facility] ENDO;  Service: Endoscopy;  Laterality: N/A;  Referral: BETHELANDRESPATRIC JOVANNI CARIASCherise  ok to hold Plavix 5 days and Eliquis 2 days per Dr Liliana MUJICA Meds  Suprep   Inst portal   LW    CORONARY ANGIOGRAPHY INCLUDING BYPASS GRAFTS WITH CATHETERIZATION OF LEFT HEART N/A 11/11/2020    Procedure: ANGIOGRAM, CORONARY, INCLUDING BYPASS GRAFT, WITH LEFT HEART CATHETERIZATION;  Surgeon: Laen Cuellar MD;  Location: [facility] CATH LAB;  Service: Cardiology;  Laterality: N/A;  RN PRE OP 11-5-2020. --COVID NEGATIVE ON  11-. C A    CORONARY ARTERY BYPASS GRAFT      March 2016    EPIDURAL STEROID INJECTION Bilateral 11/14/2018    Procedure: Lumbar Medial Branch Blocks;  Surgeon: Eze Byrd Jr., MD;  Location: [facility];  Service: Pain Management;  Laterality: Bilateral;  Bilateral Lumbar Medial Branch Blocks L2, L3, L4, L5    49226  [id number]    Arrive @ 1150; NO Sedation    EPIDURAL STEROID INJECTION Bilateral 11/28/2018    Procedure: Injection, Steroid, Epidural;  Surgeon: Eze Byrd Jr., MD;  Location: [facility];  Service: Pain Management;  Laterality: Bilateral;  Bilateral Sacroiliac Joint Steroid Injections    99731    Arrive @ 0910    EPIDURAL STEROID INJECTION Bilateral 3/20/2019    Procedure: Injection, Steroid, Sacroiliiac Joint;  Surgeon: Eze Byrd Jr., MD;  Location: [facility];  Service: Pain Management;  Laterality: Bilateral;  Bilateral Sacroiliac Joint Steroid Injections    12118    Arrive @ 1145; Trigger point injections also?    EPIDURAL STEROID INJECTION Right 8/2/2023    Procedure: Right L5 Transforaminal Epidural Steroid Injection;  Surgeon: Eze Byrd Jr., MD;  Location: [facility];  Service: Pain Management;  Laterality: Right;  @0830 (given)  Eliquis last 7/29  Plavix last    Check BG    EPIDURAL STEROID INJECTION Right 10/11/2023    Procedure: Right L3 (infraneural) + S1 Transforaminal Epidural Steroid Injections;  Surgeon: Eze Byrd Jr., MD;  Location: [facility] ENDO;  Service: Pain Management;  Laterality: Right;  @0745 (requests morning)  Eliquis 10/7 & Plavix 10/5  Check BG    ESOPHAGOGASTRODUODENOSCOPY N/A 2023    Procedure: EGD (ESOPHAGOGASTRODUODENOSCOPY);  Surgeon: Anita Oswald MD;  Location: [facility] ENDO;  Service: Endoscopy;  Laterality: N/A;  Procedure Timin-4 weeks  Provider: Any GI provider  Location: No Preference  Additional Scheduling Information: Blood thinners  Prep Specifications:N/A   ref. by Dr. Light, instr. to portal, , [facility] meds-st  ok to hold Plavix 5 days and Eliquis 2 day    INTRAOCULAR PROSTHESES INSERTION Left 2022    Procedure: INSERTION, IOL PROSTHESIS;  Surgeon: Nickolas Hong MD;  Location: [facility] OR;  Service: Ophthalmology;  Laterality: Left;    LAMINOFORAMINOTOMY OF SPINE USING MINIMALLY INVASIVE TECHNIQUE Right 2025    Procedure: LAMINOFORAMINOTOMY, SPINE, MINIMALLY INVASIVE;  Surgeon: Luis M Ellis MD;  Location: [facility] OR;  Service: Neurosurgery;  Laterality: Right;  Right L4/5 minimally invasive laminotomy/microdiscectomy. silvana table, lucio frame, fluoro, microscope, no vendor, no monitoring  CLEARED BY CARDS AND PCP   RN PREOP 24---T/S--done -----NEED ORDERS    PHACOEMULSIFICATION OF CATARACT Left 2022    Procedure: PHACOEMULSIFICATION, CATARACT;  Surgeon: Nickolas Hong MD;  Location: [facility] OR;  Service: Ophthalmology;  Laterality: Left;  RN Phone Pre Op 22.  Covid NEGATIVE  22.  Arrival 08:00 am.    TONSILLECTOMY       Family History       Problem Relation (Age of Onset)    Blindness Brother    Depression Mother    Diabetes Sister    Heart disease Mother    No Known Problems Sister, Sister, Maternal Aunt, Maternal Uncle, Paternal Aunt, Paternal Uncle, Maternal  Grandmother, Maternal Grandfather, Paternal Grandmother, Paternal Grandfather    Stroke Father          Tobacco Use    Smoking status: Never     Passive exposure: Never    Smokeless tobacco: Never   Substance and Sexual Activity    Alcohol use: Not Currently    Drug use: No    Sexual activity: Yes     Partners: Female     Review of Systems  Objective:     Weight: 80.6 kg (177 lb 11.1 oz)  Body mass index is 27.83 kg/m².  Vital Signs (Most Recent):  Temp: 97.8 °F (36.6 °C) (01/12/25 1901)  Pulse: 61 (01/12/25 2101)  Resp: 17 (01/12/25 2101)  BP: (!) 195/78 (prn hydralazine given) (01/12/25 2101)  SpO2: (!) 93 % (01/12/25 2101) Vital Signs (24h Range):  Temp:  [97.7 °F (36.5 °C)-98.1 °F (36.7 °C)] 97.8 °F (36.6 °C)  Pulse:  [49-80] 61  Resp:  [12-28] 17  SpO2:  [90 %-100 %] 93 %  BP: (107-209)/(53-88) 195/78     Date 01/12/25 0700 - 01/13/25 0659   Shift 7111-1144 7729-2852 8775-6452 24 Hour Total   INTAKE   Shift Total(mL/kg)       OUTPUT   Urine(mL/kg/hr)  1100  1100   Shift Total(mL/kg)  1100(13.6)  1100(13.6)   Weight (kg) 80.6 80.6 80.6 80.6       Neurosurgery Physical Exam  Patient is awake and alert  Conversant, does not know year, pleasantly confused  Fcx4, symmetric strength  GCS 14  4/5 motor strength R EHL/DF, stable to pre op  MIS incision is cdi with dermabond    Significant Labs:  Recent Labs   Lab 01/11/25  0431 01/12/25  0028   GLU 72 74    137   K 4.5 3.9    105   CO2 21* 18*   BUN 21 17   CREATININE 1.1 0.9   CALCIUM 9.4 9.2   MG 1.6 1.7     Recent Labs   Lab 01/11/25  0431 01/12/25  0028   WBC 6.38 7.50   HGB 12.7* 13.2*   HCT 40.9 40.4    238     Recent Labs   Lab 01/11/25  1733   INR 1.2   APTT 29.5     Microbiology Results (last 7 days)       ** No results found for the last 168 hours. **            Significant Diagnostics:    MRI B 1/12 shows areas of additional stroke in R temporal region, likely related to R M2 occlusion which recanalized yesterday    Assessment/Plan:   This  is a 75yo gentleman POD#6 s/p R L4/5 MIS decompression who presents with bilateral strokes  -no surgical intervention is indicated  -defer to neurology for stroke workup and management, remain on eliquis/plavix  -recommend PT/OT. Awaiting inpatient rehab  -consider obtaining CT soft tissue neck while inpatient (imaging had been scheduled by ENT to be done 1/13 to eval parotid lesion)    Luis M Ellis  Neurosurgery      Active Diagnoses:    Diagnosis Date Noted POA    PRINCIPAL PROBLEM:  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 [I63.411] 01/11/2025 Yes    Arterial ischemic stroke, multifocal, multiple vascular territories, acute [I63.89] 01/11/2025 Yes    Fall at home [W19.XXXA, Y92.009] 01/10/2025 Not Applicable    Encephalopathy [G93.40] 01/10/2025 Yes    Syncope and collapse [R55] 01/10/2025 Yes    Impaired mobility and ADLs [Z74.09, Z78.9] 11/15/2021 Yes    Longstanding persistent atrial fibrillation [I48.11] 12/30/2020 Yes    Hx of CABG [Z95.1] 03/05/2020 Not Applicable    Posture imbalance [R29.3] 08/22/2017 Yes    Type 2 diabetes mellitus with peripheral neuropathy [E11.42] 01/24/2017 Yes    Gastroesophageal reflux disease [K21.9] 01/24/2017 Yes    Mixed hyperlipidemia [E78.2] 01/24/2017 Yes    Essential hypertension [I10] 01/24/2017 Yes      Problems Resolved During this Admission:    Diagnosis Date Noted Date Resolved POA    Pulmonary artery hypertension [I27.21] 01/11/2025 01/12/2025 Yes       Thank you for your consult.     Luis M Ellis MD  Neurosurgery  [facility] - Telemetry

## 2025-01-13 NOTE — ASSESSMENT & PLAN NOTE
74M. Hx HTN, HLD, Afib on Eliuquis, CAD s/p CABG, CKD3, insulin dependent diabetes. Admitted to [facility] overnight 1/9 to 1/10 following a syncopal episode w/fall, as well as encephalopathy. Seen by tele-neurology. Recommended MRI (demonstrated bilateral MCA/PCA watershed infarcts), MRA (demonstrated diminished flow in the distal BL MCA), EEG (posterior slowing), and CTA head/neck (unrevealing on 1/10). Patient exhibited an acute change this morning around 10AM, described as left hemiplegia and dysarthria. Stroke code called, and repeat CTA demonstrated R M2 occlusion. No TNK due to recent Eliquis (08:13 that AM, 20:40 the evening before). Patient [place] [facility] [place] capacity. Taken for angiogram at [facility]. DSA demonstrated recanalization of R M2, no intervention performed. Admitted to [facility] for post-procedure monitoring. 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 Eliquis and Plavix restarted. Examination with improved left-sided neglect, though is still present. 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 Stable for stepdown to NPU.    Repeat MRI brain w/o contrast to assess for stroke burden following right M2 occlusion revealing infarct extension within the right cerebral hemisphere into the frontal and temporal lobes along with development of petechial hemorrhage/hemorrhagic conversion in the right parietal lobe. Clinically, pt is improving.     Antithrombotics for secondary stroke prevention: Anticoagulants: Apixaban 5 mg BID , Antiplatelets: Plavix 75mg daily    Statins for secondary stroke prevention and hyperlipidemia, if present:   Statins: Atorvastatin- 80 mg daily    Aggressive risk factor modification: HTN, DM, HLD, CAD     Rehab efforts: The patient has been evaluated by a stroke team provider and the therapy needs have been fully considered based off the presenting complaints and exam findings. The following therapy evaluations are  needed: PT evaluate and treat, OT evaluate and treat, SLP evaluate and treat, PM&R evaluate for appropriate placement    VTE prophylaxis: Mechanical prophylaxis: Place SCDs    BP parameters: Infarct: Post unsucessful thrombectomy, SBP <220

## 2025-01-13 NOTE — HOSPITAL COURSE
1/12/2025: Solumedrol for itching post-contrast, worked well. Dc'd PE scan. ECHO reordered. SD to stroke.  1/13/2025: Contacted cardiology who believes the initial elevated PASP on OSH ECHO was inaccurate. They reviewed the ECHO this morning, no significant wall motion abnormalities or right heart strain.

## 2025-01-13 NOTE — SUBJECTIVE & OBJECTIVE
Neurologic Chief Complaint: bilateral MCA/PCA watershed infarcts and R MCA infarct    Subjective:     Interval History: Patient is seen for follow-up neurological assessment and treatment recommendations: NAEON. HDS, afebrile, sating well on room air. Repeat MRI revealing progression of damage due to stroke, although clinically pt is improving.     HPI, Past Medical, Family, and Social History remains the same as documented in the initial encounter.     Review of Systems   Constitutional:  Negative for fever.   HENT:  Negative for congestion.    Eyes:  Negative for visual disturbance.   Respiratory:  Negative for shortness of breath.    Cardiovascular:  Negative for chest pain.   Gastrointestinal:  Negative for abdominal distention and abdominal pain.   Endocrine: Negative for polyuria.   Genitourinary:  Negative for difficulty urinating.   Neurological:  Negative for dizziness and headaches.     Scheduled Meds:   apixaban  5 mg Oral BID    atorvastatin  80 mg Oral QHS    clopidogreL  75 mg Oral Daily    fenofibrate  48 mg Oral Daily    fluticasone propionate  2 spray Each Nostril BID    hydrALAZINE  50 mg Oral Q12H    isosorbide mononitrate  120 mg Oral Daily    mupirocin   Nasal BID    pantoprazole  40 mg Oral Daily    polyethylene glycol  17 g Oral BID    senna-docusate 8.6-50 mg  2 tablet Oral BID    tamsulosin  0.4 mg Oral Daily     Continuous Infusions:  PRN Meds:  Current Facility-Administered Medications:     acetaminophen, 650 mg, Oral, Q6H PRN    aluminum-magnesium hydroxide-simethicone, 30 mL, Oral, QID PRN    bisacodyL, 10 mg, Rectal, Daily PRN    carboxymethylcellulose sodium, 1 drop, Ophthalmic, QID PRN    dextrose 50%, 12.5 g, Intravenous, PRN    dextrose 50%, 25 g, Intravenous, PRN    glucagon (human recombinant), 1 mg, Intramuscular, PRN    glucose, 16 g, Oral, PRN    glucose, 24 g, Oral, PRN    hydrALAZINE, 10 mg, Intravenous, Q4H PRN    hydrocortisone, , Topical (Top), BID PRN    hydrOXYzine HCL,  25 mg, Oral, TID PRN    insulin aspart U-100, 0-10 Units, Subcutaneous, QID (AC + HS) PRN    labetalol, 10 mg, Intravenous, Q4H PRN    ondansetron, 4 mg, Intravenous, Q4H PRN    oxyCODONE, 5 mg, Oral, Q4H PRN    sodium chloride 0.9%, 10 mL, Intravenous, PRN    Objective:     Vital Signs (Most Recent):  Temp: 97.9 °F (36.6 °C) (01/13/25 1200)  Pulse: 70 (01/13/25 1500)  Resp: 17 (01/13/25 1500)  BP: (!) 159/72 (01/13/25 1500)  SpO2: 98 % (01/13/25 1500)  BP Location: Left arm    Vital Signs Range (Last 24H):  Temp:  [97.6 °F (36.4 °C)-98.1 °F (36.7 °C)]   Pulse:  [56-80]   Resp:  [13-28]   BP: (101-209)/()   SpO2:  [93 %-100 %]   BP Location: Left arm       Physical Exam  Constitutional:       General: He is not in acute distress.  HENT:      Head: Normocephalic and atraumatic.   Eyes:      Extraocular Movements: Extraocular movements intact.      Conjunctiva/sclera: Conjunctivae normal.      Pupils: Pupils are equal, round, and reactive to light.   Cardiovascular:      Rate and Rhythm: Normal rate and regular rhythm.      Pulses: Normal pulses.      Heart sounds: Normal heart sounds.   Pulmonary:      Breath sounds: Normal breath sounds.   Abdominal:      General: Abdomen is flat. Bowel sounds are normal. There is no distension.      Tenderness: There is no abdominal tenderness.   Musculoskeletal:         General: No swelling or tenderness.   Skin:     General: Skin is warm and dry.      Capillary Refill: Capillary refill takes less than 2 seconds.   Neurological:      Mental Status: He is alert.              Neurological Exam:   LOC: alert  Attention Span: Good   Language: No aphasia  Articulation: No dysarthria  Orientation: Person, Place, Time   Visual Fields: Full  EOM (CN III, IV, VI): Full/intact  Pupils (CN II, III): PERRL  Facial Movement (CN VII): left facial droop  Motor: Arm left  Normal 5/5  Leg left  Normal 5/5  Arm right  Normal 5/5  Leg right Normal 5/5  Sensation: severe sensory loss of left  arm and leg    Laboratory:  CMP:   Recent Labs   Lab 01/13/25  0124   CALCIUM 9.2   ALBUMIN 3.1*   PROT 7.0   *   K 4.1   CO2 17*      BUN 22   CREATININE 1.3   ALKPHOS 30*   ALT 14   AST 20   BILITOT 0.6     CBC:   Recent Labs   Lab 01/13/25  0124   WBC 7.63   RBC 4.85   HGB 13.2*   HCT 39.5*      MCV 81*   MCH 27.2   MCHC 33.4       Diagnostic Results     Brain Imaging   MRI Brain: 1/10/25  Bilateral posterior parietal cortical infarcts right greater than left.           MRA Head/Neck: 1/10/25  1. There is diminished flow signal and luminal narrowing in the distal MCA distribution bilaterally in the posterior parietal region in an area of infarction.  Vascular consultation and follow-up recommended.  CTA may better characterize.  2. There is bilateral flow signal loss in the distal vertebral arteries bilaterally of indeterminate etiology.  This could be artifact.  High-grade narrowing/diminished flow of the distal V4 segment of the left vertebral artery is a possibility.  Mild narrowing or diminished flow of the V4 segment of the right vertebral artery is not excluded also.  Basilar artery demonstrates normal flow signal.  CTA may better characterize.  Vascular consultation and follow-up recommended.  3. Posterior cerebral arteries are patent proximally bilaterally.  P1 and P2 segments are adequately demonstrated.  Probable mild narrowing in the P2 segment on the left.. Possible mild luminal narrowing and diminished flow signal in the more distal components of the PCA distribution bilaterally.     CTA Head and Neck: 1/10  1. Bilateral parietal lobe regions of acute infarction, as seen on earlier MRI.  2. CTA head and neck with no evidence of high-grade stenosis, large vessel occlusion, or dissection.  3. Approximately 50% stenosis at the right ICA origin.  4. Indeterminate small bilateral parotid enhancing lesions.     CTA Head and Neck: 1/11  Evolving parieto-occipital infarcts again  identified. Irregular calcified and soft plaque at the carotid bifurcations however less than 50% stenosis by NASCET criteria. At least moderate stenosis at the origin of the vertebral arteries bilaterally. Evaluation of the [id number] demonstrates a new right M2 branch occlusion, the occlusion is somewhat distal although the branch is quite large in size.    MRI Brain 1/12/25:   Compared to MRI 01/10/2025, there is infarct extension within the right cerebral hemisphere into the frontal and temporal lobes.  Development of petechial hemorrhage/hemorrhagic conversion in the right parietal lobe.   Similar-sized left parietal infarct with development of petechial hemorrhage.   Bilateral parotid cystic lesions, which could represent cysts or mucoceles.  Other etiologies including cystic neoplasm are not excluded.  Consider follow-up outpatient soft tissue ultrasound if clinically warranted.     Cardiac Imaging   Echo: 1/10/25    Left Ventricle: The left ventricle is normal in size. Mildly increased wall thickness. There is mild concentric hypertrophy. There is normal systolic function with a visually estimated ejection fraction of 55 - 60%. Unable to assess diastolic function due to atrial fibrillation.    Right Ventricle: Systolic function is normal.    Aortic Valve: The aortic valve is a trileaflet valve. There is moderate aortic valve sclerosis. Mildly restricted motion. There is mild stenosis. Aortic valve area by VTI is 1.9 cm². Aortic valve peak velocity is 1.5 m/s. Mean gradient is 5.3 mmHg. The dimensionless index is 0.59.    Tricuspid Valve: There is mild regurgitation.    Pulmonary Artery: The estimated pulmonary artery systolic pressure is 93 mmHg.    Pt in AFib throughout exam.

## 2025-01-13 NOTE — PROGRESS NOTES
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 Hx HTN, HLD, Afib on Eliuquis, CAD s/p CABG, CKD3, insulin dependent diabetes. Admitted to [facility] overnight 1/9 to 1/10 following a syncopal episode w/fall, as well as encephalopathy. Seen by tele-neurology. Recommended MRI (demonstrated bilateral MCA/PCA watershed infarcts), MRA (demonstrated diminished flow in the distal BL MCA), EEG (posterior slowing), and CTA head/neck (unrevealing on 1/10). Patient exhibited an acute change this morning around 10AM, described as left hemiplegia and dysarthria. Stroke code called, and repeat CTA demonstrated R M2 occlusion. No TNK due to recent Eliquis (08:13 that AM, 20:40 the evening before). Patient transferred to [facility] [place] for neuro-IR capacity. Taken for angiogram at [facility] [place]. DSA demonstrated recanalization of R M2, no intervention performed. Admitted to [facility] for post-procedure monitoring. 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 Eliquis and Plavix restarted. Examination with improved left-sided neglect, though is still present. 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 Stable for stepdown to NPU.    Repeat MRI brain w/o contrast to assess for stroke burden following right M2 occlusion revealing infarct extension within the right cerebral hemisphere into the frontal and temporal lobes along with development of petechial hemorrhage/hemorrhagic conversion in the right parietal lobe. Clinically, pt is improving.     Antithrombotics for secondary stroke prevention: Anticoagulants: Apixaban 5 mg BID , Antiplatelets: Plavix 75mg daily    Statins for secondary stroke prevention and hyperlipidemia, if present:   Statins: Atorvastatin- 80 mg daily    Aggressive risk factor modification: HTN, DM, HLD, CAD     Rehab efforts: The patient has been  evaluated by a stroke team provider and the therapy needs have been fully considered based off the presenting complaints and exam findings. The following therapy evaluations are needed: PT evaluate and treat, OT evaluate and treat, SLP evaluate and treat, PM&R evaluate for appropriate placement    VTE prophylaxis: Mechanical prophylaxis: Place SCDs    BP parameters: Infarct: Post unsucessful thrombectomy, SBP <220        Arterial ischemic stroke, multifocal, multiple vascular territories, acute  Bilateral MCA/PCA watershed territory infarcts, likely due to hypotension in setting on syncope. See primary problem.    Longstanding persistent atrial fibrillation  Stroke risk-factor. Echo on 1/10 with Afib. On Eliquis: received at 20:40 on 1/10, and at 08:13 on 1/11, both prior to acute neurologic decline (right MCA syndrome). Eliquis restarted yesterday evening.    -continue apixaban 5mg BID    Hx of CABG  Continue statin, fibrate, Plavix as detailed elsewhere.    Essential hypertension  Stroke risk-factor.    -hydralazine 50mg BID    Mixed hyperlipidemia  Stroke risk-factor. LDL 82 on admit.    -Lipitor 80mg  -fenofibrate    Type 2 diabetes mellitus with peripheral neuropathy  Stroke risk-factor. A1C 6.1% on admission.    -goal 140 to 180 while admitted  -sliding scale insulin  -accuchecks         1/12/25: taken for endovascular thrombectomy, RM2 recanalized spontaneously, examination improved today with less dense left-side neglect though is still present, Eliquis and Plavix both restarted, stable for step-down to NPU    STROKE DOCUMENTATION   Acute Stroke Times   Last Known Normal Date: 01/11/25  Last Known Normal Time: 0950  Symptom Onset Date: 01/11/25  Symptom Onset Time: 0950  Stroke Team Called Date: 01/11/25  Stroke Team Called Time: 1320  Stroke Team Arrival Date: 01/11/25  Stroke Team Arrival Time: 1320  CT Interpretation Time:  (done at OSH)  Thrombolytic Therapy Recommended: No  Thrombectomy Recommended:  Yes  Decision to Treat Time for IR: 1140 (Made at OSH)    NIH Scale:  1a. Level of Consciousness: 0-->Alert, keenly responsive  1b. LOC Questions: 0-->Answers both questions correctly  1c. LOC Commands: 0-->Performs both tasks correctly  2. Best Gaze: 0-->Normal  3. Visual: 0-->No visual loss  4. Facial Palsy: 1-->Minor paralysis (flattened nasolabial fold, asymmetry on smiling)  5a. Motor Arm, Left: 0-->No drift, limb holds 90 (or 45) degrees for full 10 secs  5b. Motor Arm, Right: 0-->No drift, limb holds 90 (or 45) degrees for full 10 secs  6a. Motor Leg, Left: 0-->No drift, leg holds 30 degree position for full 5 secs  6b. Motor Leg, Right: 0-->No drift, leg holds 30 degree position for full 5 secs  7. Limb Ataxia: 0-->Absent  8. Sensory: 2-->Severe to total sensory loss, patient is not aware of being touched in the face, arm, and leg  9. Best Language: 0-->No aphasia, normal  10. Dysarthria: 0-->Normal  11. Extinction and Inattention (formerly Neglect): 1-->Visual, tactile, auditory, spatial, or personal inattention or extinction to bilateral simultaneous stimulation in one of the sensory modalities  Total (NIH Stroke Scale): 4       Modified Angelica Score: 4  Fayette Coma Scale:    ABCD2 Score:    [id number] Score:   HAS -BLED Score:   ICH Score:   Hunt & Zamudio Classification:      Hemorrhagic change of an Ischemic Stroke: Does this patient have an ischemic stroke with hemorrhagic changes? Yes, Grading Scale: HI Type 1 (HI-1) = small petechiae along the margins of the infarct. Is this a symptomatic change?  No - Hemorrhage is not clinically significant     Neurologic Chief Complaint: bilateral MCA/PCA watershed infarcts and R MCA infarct    Subjective:     Interval History: Patient is seen for follow-up neurological assessment and treatment recommendations: NAEON. HDS, afebrile, sating well on room air. Repeat MRI revealing progression of damage due to stroke, although clinically pt is improving.     HPI, Past  Medical, Family, and Social History remains the same as documented in the initial encounter.     Review of Systems   Constitutional:  Negative for fever.   HENT:  Negative for congestion.    Eyes:  Negative for visual disturbance.   Respiratory:  Negative for shortness of breath.    Cardiovascular:  Negative for chest pain.   Gastrointestinal:  Negative for abdominal distention and abdominal pain.   Endocrine: Negative for polyuria.   Genitourinary:  Negative for difficulty urinating.   Neurological:  Negative for dizziness and headaches.     Scheduled Meds:   apixaban  5 mg Oral BID    atorvastatin  80 mg Oral QHS    clopidogreL  75 mg Oral Daily    fenofibrate  48 mg Oral Daily    fluticasone propionate  2 spray Each Nostril BID    hydrALAZINE  50 mg Oral Q12H    isosorbide mononitrate  120 mg Oral Daily    mupirocin   Nasal BID    pantoprazole  40 mg Oral Daily    polyethylene glycol  17 g Oral BID    senna-docusate 8.6-50 mg  2 tablet Oral BID    tamsulosin  0.4 mg Oral Daily     Continuous Infusions:  PRN Meds:  Current Facility-Administered Medications:     acetaminophen, 650 mg, Oral, Q6H PRN    aluminum-magnesium hydroxide-simethicone, 30 mL, Oral, QID PRN    bisacodyL, 10 mg, Rectal, Daily PRN    carboxymethylcellulose sodium, 1 drop, Ophthalmic, QID PRN    dextrose 50%, 12.5 g, Intravenous, PRN    dextrose 50%, 25 g, Intravenous, PRN    glucagon (human recombinant), 1 mg, Intramuscular, PRN    glucose, 16 g, Oral, PRN    glucose, 24 g, Oral, PRN    hydrALAZINE, 10 mg, Intravenous, Q4H PRN    hydrocortisone, , Topical (Top), BID PRN    hydrOXYzine HCL, 25 mg, Oral, TID PRN    insulin aspart U-100, 0-10 Units, Subcutaneous, QID (AC + HS) PRN    labetalol, 10 mg, Intravenous, Q4H PRN    ondansetron, 4 mg, Intravenous, Q4H PRN    oxyCODONE, 5 mg, Oral, Q4H PRN    sodium chloride 0.9%, 10 mL, Intravenous, PRN    Objective:     Vital Signs (Most Recent):  Temp: 97.9 °F (36.6 °C) (01/13/25 1200)  Pulse: 70  (01/13/25 1500)  Resp: 17 (01/13/25 1500)  BP: (!) 159/72 (01/13/25 1500)  SpO2: 98 % (01/13/25 1500)  BP Location: Left arm    Vital Signs Range (Last 24H):  Temp:  [97.6 °F (36.4 °C)-98.1 °F (36.7 °C)]   Pulse:  [56-80]   Resp:  [13-28]   BP: (101-209)/()   SpO2:  [93 %-100 %]   BP Location: Left arm       Physical Exam  Constitutional:       General: He is not in acute distress.  HENT:      Head: Normocephalic and atraumatic.   Eyes:      Extraocular Movements: Extraocular movements intact.      Conjunctiva/sclera: Conjunctivae normal.      Pupils: Pupils are equal, round, and reactive to light.   Cardiovascular:      Rate and Rhythm: Normal rate and regular rhythm.      Pulses: Normal pulses.      Heart sounds: Normal heart sounds.   Pulmonary:      Breath sounds: Normal breath sounds.   Abdominal:      General: Abdomen is flat. Bowel sounds are normal. There is no distension.      Tenderness: There is no abdominal tenderness.   Musculoskeletal:         General: No swelling or tenderness.   Skin:     General: Skin is warm and dry.      Capillary Refill: Capillary refill takes less than 2 seconds.   Neurological:      Mental Status: He is alert.              Neurological Exam:   LOC: alert  Attention Span: Good   Language: No aphasia  Articulation: No dysarthria  Orientation: Person, Place, Time   Visual Fields: Full  EOM (CN III, IV, VI): Full/intact  Pupils (CN II, III): PERRL  Facial Movement (CN VII): left facial droop  Motor: Arm left  Normal 5/5  Leg left  Normal 5/5  Arm right  Normal 5/5  Leg right Normal 5/5  Sensation: severe sensory loss of left arm and leg    Laboratory:  CMP:   Recent Labs   Lab 01/13/25  0124   CALCIUM 9.2   ALBUMIN 3.1*   PROT 7.0   *   K 4.1   CO2 17*      BUN 22   CREATININE 1.3   ALKPHOS 30*   ALT 14   AST 20   BILITOT 0.6     CBC:   Recent Labs   Lab 01/13/25  0124   WBC 7.63   RBC 4.85   HGB 13.2*   HCT 39.5*      MCV 81*   MCH 27.2   MCHC 33.4        Diagnostic Results     Brain Imaging   MRI Brain: 1/10/25  Bilateral posterior parietal cortical infarcts right greater than left.           MRA Head/Neck: 1/10/25  1. There is diminished flow signal and luminal narrowing in the distal MCA distribution bilaterally in the posterior parietal region in an area of infarction.  Vascular consultation and follow-up recommended.  CTA may better characterize.  2. There is bilateral flow signal loss in the distal vertebral arteries bilaterally of indeterminate etiology.  This could be artifact.  High-grade narrowing/diminished flow of the distal V4 segment of the left vertebral artery is a possibility.  Mild narrowing or diminished flow of the V4 segment of the right vertebral artery is not excluded also.  Basilar artery demonstrates normal flow signal.  CTA may better characterize.  Vascular consultation and follow-up recommended.  3. Posterior cerebral arteries are patent proximally bilaterally.  P1 and P2 segments are adequately demonstrated.  Probable mild narrowing in the P2 segment on the left.. Possible mild luminal narrowing and diminished flow signal in the more distal components of the PCA distribution bilaterally.     CTA Head and Neck: 1/10  1. Bilateral parietal lobe regions of acute infarction, as seen on earlier MRI.  2. CTA head and neck with no evidence of high-grade stenosis, large vessel occlusion, or dissection.  3. Approximately 50% stenosis at the right ICA origin.  4. Indeterminate small bilateral parotid enhancing lesions.     CTA Head and Neck: 1/11  Evolving parieto-occipital infarcts again identified. Irregular calcified and soft plaque at the carotid bifurcations however less than 50% stenosis by NASCET criteria. At least moderate stenosis at the origin of the vertebral arteries bilaterally. Evaluation of the [id number] demonstrates a new right M2 branch occlusion, the occlusion is somewhat distal although the branch is quite large in  size.    MRI Brain 1/12/25:   Compared to MRI 01/10/2025, there is infarct extension within the right cerebral hemisphere into the frontal and temporal lobes.  Development of petechial hemorrhage/hemorrhagic conversion in the right parietal lobe.   Similar-sized left parietal infarct with development of petechial hemorrhage.   Bilateral parotid cystic lesions, which could represent cysts or mucoceles.  Other etiologies including cystic neoplasm are not excluded.  Consider follow-up outpatient soft tissue ultrasound if clinically warranted.     Cardiac Imaging   Echo: 1/10/25    Left Ventricle: The left ventricle is normal in size. Mildly increased wall thickness. There is mild concentric hypertrophy. There is normal systolic function with a visually estimated ejection fraction of 55 - 60%. Unable to assess diastolic function due to atrial fibrillation.    Right Ventricle: Systolic function is normal.    Aortic Valve: The aortic valve is a trileaflet valve. There is moderate aortic valve sclerosis. Mildly restricted motion. There is mild stenosis. Aortic valve area by VTI is 1.9 cm². Aortic valve peak velocity is 1.5 m/s. Mean gradient is 5.3 mmHg. The dimensionless index is 0.59.    Tricuspid Valve: There is mild regurgitation.    Pulmonary Artery: The estimated pulmonary artery systolic pressure is 93 mmHg.    Pt in AFib throughout exam.    Suinta Sahu MD  Comprehensive Stroke Center  Department of Vascular Neurology   [facility] - Neuro Critical Care

## 2025-01-13 NOTE — PLAN OF CARE
[facility] Care Plan    POC reviewed with Driss Hernandez Jr. and family at 0300. Patient verbalized understanding. Questions and concerns addressed. No acute events today. Pt progressing toward goals. Will continue to monitor. See below and flowsheets for full assessment and VS info.     Neuro exam unchanged  Hypertensive, PRN medications given  Voided x2  Stood at side of bed x2  500ml LR bolus  Back incision dressing removed by MD  Groin site cdi    Is this a stroke patient? yes- Stroke booklet reviewed with patient and family, risk factors identified for patient and stroke booklet remains at bedside for ongoing education.     Care individualization:   Problem: Stroke, Ischemic (Includes Transient Ischemic Attack)  Goal: Optimal Coping  Intervention: Support Psychosocial Response to Stroke  Flowsheets (Taken 1/13/2025 0530)  Supportive Measures:   positive reinforcement provided   relaxation techniques promoted   problem-solving facilitated     Problem: Stroke, Ischemic (Includes Transient Ischemic Attack)  Goal: Improved Communication Skills  Intervention: Optimize Communication Skills  Flowsheets (Taken 1/13/2025 0530)  Communication Enhancement Strategies:   extra time allowed for response   one-step directions provided   repetition utilized     Problem: Stroke, Ischemic (Includes Transient Ischemic Attack)  Goal: Effective Urinary Elimination  Intervention: Promote Effective Bladder Elimination  Flowsheets (Taken 1/13/2025 0530)  Urinary Elimination Promotion:   frequent voiding encouraged   positioned for ease of voiding   toileting offered   catheter patency maintained   bladder volume assessed by ultrasound       Neuro:  Derick Coma Scale  Best Eye Response: 4-->(E4) spontaneous  Best Motor Response: 6-->(M6) obeys commands  Best Verbal Response: 4-->(V4) confused  Derick Coma Scale Score: 14  Assessment Qualifiers: patient not sedated/intubated, no eye obstruction present  Pupil PERRLA: yes     24 hr  "Temp:  [97.6 °F (36.4 °C)-98.1 °F (36.7 °C)]     CV:   Rhythm: atrial rhythm  BP goals:   SBP < 180  MAP > 65    Resp:           Plan: N/A    GI/:     Diet/Nutrition Received: regular  Last Bowel Movement: 01/09/25  Voiding Characteristics: external catheter, due to void    Intake/Output Summary (Last 24 hours) at 1/13/2025 0530  Last data filed at 1/13/2025 0501  Gross per 24 hour   Intake 300 ml   Output 2300 ml   Net -2000 ml     Unmeasured Output  Urine Occurrence: 1  Pad Count: 1    Labs/Accuchecks:  Recent Labs   Lab 01/13/25  0124   WBC 7.63   RBC 4.85   HGB 13.2*   HCT 39.5*         Recent Labs   Lab 01/13/25  0124   *   K 4.1   CO2 17*      BUN 22   CREATININE 1.3   ALKPHOS 30*   ALT 14   AST 20   BILITOT 0.6      Recent Labs   Lab 01/11/25  1733   INR 1.2   APTT 29.5    No results for input(s): "CPK", "CPKMB", "TROPONINI", "MB" in the last 168 hours.    Electrolytes: N/A - electrolytes WDL  Accuchecks: ACHS    Gtts:      LDA/Wounds:    Garfield Risk Assessment  Sensory Perception: 4-->no impairment  Moisture: 4-->rarely moist  Activity: 1-->bedfast  Mobility: 3-->slightly limited  Nutrition: 3-->adequate  Friction and Shear: 3-->no apparent problem  Garfield Score: 18  Is your garfield score 12 or less? no      WCTM   "

## 2025-01-14 LAB
ALBUMIN SERPL BCP-MCNC: 3.3 G/DL (ref 3.5–5.2)
ALP SERPL-CCNC: 36 U/L (ref 40–150)
ALT SERPL W/O P-5'-P-CCNC: 17 U/L (ref 10–44)
ANION GAP SERPL CALC-SCNC: 12 MMOL/L (ref 8–16)
AST SERPL-CCNC: 28 U/L (ref 10–40)
BASOPHILS # BLD AUTO: 0.05 K/UL (ref 0–0.2)
BASOPHILS NFR BLD: 0.4 % (ref 0–1.9)
BILIRUB SERPL-MCNC: 0.8 MG/DL (ref 0.1–1)
BUN SERPL-MCNC: 23 MG/DL (ref 8–23)
CALCIUM SERPL-MCNC: 9.1 MG/DL (ref 8.7–10.5)
CHLORIDE SERPL-SCNC: 107 MMOL/L (ref 95–110)
CO2 SERPL-SCNC: 20 MMOL/L (ref 23–29)
CREAT SERPL-MCNC: 1.2 MG/DL (ref 0.5–1.4)
DIFFERENTIAL METHOD BLD: ABNORMAL
EOSINOPHIL # BLD AUTO: 0.2 K/UL (ref 0–0.5)
EOSINOPHIL NFR BLD: 1.5 % (ref 0–8)
ERYTHROCYTE [DISTWIDTH] IN BLOOD BY AUTOMATED COUNT: 14.8 % (ref 11.5–14.5)
EST. GFR  (NO RACE VARIABLE): >60 ML/MIN/1.73 M^2
GLUCOSE SERPL-MCNC: 125 MG/DL (ref 70–110)
HCT VFR BLD AUTO: 39.9 % (ref 40–54)
HGB BLD-MCNC: 13.4 G/DL (ref 14–18)
IMM GRANULOCYTES # BLD AUTO: 0.04 K/UL (ref 0–0.04)
IMM GRANULOCYTES NFR BLD AUTO: 0.3 % (ref 0–0.5)
LYMPHOCYTES # BLD AUTO: 1.8 K/UL (ref 1–4.8)
LYMPHOCYTES NFR BLD: 15.3 % (ref 18–48)
MAGNESIUM SERPL-MCNC: 1.9 MG/DL (ref 1.6–2.6)
MCH RBC QN AUTO: 27.4 PG (ref 27–31)
MCHC RBC AUTO-ENTMCNC: 33.6 G/DL (ref 32–36)
MCV RBC AUTO: 82 FL (ref 82–98)
MONOCYTES # BLD AUTO: 1 K/UL (ref 0.3–1)
MONOCYTES NFR BLD: 8.8 % (ref 4–15)
NEUTROPHILS # BLD AUTO: 8.6 K/UL (ref 1.8–7.7)
NEUTROPHILS NFR BLD: 73.7 % (ref 38–73)
NRBC BLD-RTO: 0 /100 WBC
PHOSPHATE SERPL-MCNC: 2.8 MG/DL (ref 2.7–4.5)
PLATELET # BLD AUTO: 237 K/UL (ref 150–450)
PMV BLD AUTO: 9.5 FL (ref 9.2–12.9)
POCT GLUCOSE: 149 MG/DL (ref 70–110)
POCT GLUCOSE: 154 MG/DL (ref 70–110)
POTASSIUM SERPL-SCNC: 4.3 MMOL/L (ref 3.5–5.1)
PROT SERPL-MCNC: 7.1 G/DL (ref 6–8.4)
RBC # BLD AUTO: 4.89 M/UL (ref 4.6–6.2)
SODIUM SERPL-SCNC: 139 MMOL/L (ref 136–145)
WBC # BLD AUTO: 11.63 K/UL (ref 3.9–12.7)

## 2025-01-14 PROCEDURE — 97535 SELF CARE MNGMENT TRAINING: CPT | Mod: HCNC

## 2025-01-14 PROCEDURE — 94761 N-INVAS EAR/PLS OXIMETRY MLT: CPT | Mod: HCNC

## 2025-01-14 PROCEDURE — 84100 ASSAY OF PHOSPHORUS: CPT | Mod: HCNC

## 2025-01-14 PROCEDURE — 83735 ASSAY OF MAGNESIUM: CPT | Mod: HCNC

## 2025-01-14 PROCEDURE — 25000003 PHARM REV CODE 250: Mod: HCNC

## 2025-01-14 PROCEDURE — 63600175 PHARM REV CODE 636 W HCPCS: Mod: HCNC

## 2025-01-14 PROCEDURE — 97116 GAIT TRAINING THERAPY: CPT | Mod: HCNC,CQ

## 2025-01-14 PROCEDURE — 94799 UNLISTED PULMONARY SVC/PX: CPT | Mod: HCNC

## 2025-01-14 PROCEDURE — 85025 COMPLETE CBC W/AUTO DIFF WBC: CPT | Mod: HCNC

## 2025-01-14 PROCEDURE — 25000003 PHARM REV CODE 250: Mod: HCNC | Performed by: HOSPITALIST

## 2025-01-14 PROCEDURE — 97530 THERAPEUTIC ACTIVITIES: CPT | Mod: HCNC

## 2025-01-14 PROCEDURE — 25000003 PHARM REV CODE 250: Mod: HCNC | Performed by: PSYCHIATRY & NEUROLOGY

## 2025-01-14 PROCEDURE — 11000001 HC ACUTE MED/SURG PRIVATE ROOM: Mod: HCNC

## 2025-01-14 PROCEDURE — 25000242 PHARM REV CODE 250 ALT 637 W/ HCPCS: Mod: HCNC

## 2025-01-14 PROCEDURE — 99900035 HC TECH TIME PER 15 MIN (STAT): Mod: HCNC

## 2025-01-14 PROCEDURE — 80053 COMPREHEN METABOLIC PANEL: CPT | Mod: HCNC

## 2025-01-14 PROCEDURE — 99233 SBSQ HOSP IP/OBS HIGH 50: CPT | Mod: HCNC,GC,, | Performed by: STUDENT IN AN ORGANIZED HEALTH CARE EDUCATION/TRAINING PROGRAM

## 2025-01-14 PROCEDURE — 97129 THER IVNTJ 1ST 15 MIN: CPT | Mod: HCNC

## 2025-01-14 RX ORDER — CETIRIZINE HYDROCHLORIDE 5 MG/1
10 TABLET ORAL DAILY PRN
Status: DISCONTINUED | OUTPATIENT
Start: 2025-01-14 | End: 2025-01-15 | Stop reason: HOSPADM

## 2025-01-14 RX ADMIN — POLYETHYLENE GLYCOL 3350 17 G: 17 POWDER, FOR SOLUTION ORAL at 09:01

## 2025-01-14 RX ADMIN — ATORVASTATIN CALCIUM 80 MG: 40 TABLET, FILM COATED ORAL at 09:01

## 2025-01-14 RX ADMIN — INSULIN ASPART 2 UNITS: 100 INJECTION, SOLUTION INTRAVENOUS; SUBCUTANEOUS at 07:01

## 2025-01-14 RX ADMIN — MUPIROCIN: 20 OINTMENT TOPICAL at 09:01

## 2025-01-14 RX ADMIN — HYDRALAZINE HYDROCHLORIDE 50 MG: 25 TABLET ORAL at 09:01

## 2025-01-14 RX ADMIN — APIXABAN 5 MG: 5 TABLET, FILM COATED ORAL at 09:01

## 2025-01-14 RX ADMIN — OXYCODONE 5 MG: 5 TABLET ORAL at 01:01

## 2025-01-14 RX ADMIN — ACETAMINOPHEN 650 MG: 325 TABLET ORAL at 12:01

## 2025-01-14 RX ADMIN — LABETALOL HYDROCHLORIDE 10 MG: 5 INJECTION, SOLUTION INTRAVENOUS at 05:01

## 2025-01-14 RX ADMIN — FLUTICASONE PROPIONATE 100 MCG: 50 SPRAY, METERED NASAL at 09:01

## 2025-01-14 RX ADMIN — TAMSULOSIN HYDROCHLORIDE 0.4 MG: 0.4 CAPSULE ORAL at 09:01

## 2025-01-14 RX ADMIN — CLOPIDOGREL BISULFATE 75 MG: 75 TABLET ORAL at 09:01

## 2025-01-14 RX ADMIN — SENNOSIDES AND DOCUSATE SODIUM 2 TABLET: 50; 8.6 TABLET ORAL at 09:01

## 2025-01-14 RX ADMIN — FENOFIBRATE 48 MG: 48 TABLET, FILM COATED ORAL at 09:01

## 2025-01-14 RX ADMIN — HYDRALAZINE HYDROCHLORIDE 10 MG: 20 INJECTION, SOLUTION INTRAMUSCULAR; INTRAVENOUS at 08:01

## 2025-01-14 RX ADMIN — ACETAMINOPHEN 650 MG: 325 TABLET ORAL at 08:01

## 2025-01-14 RX ADMIN — PANTOPRAZOLE SODIUM 40 MG: 20 TABLET, DELAYED RELEASE ORAL at 08:01

## 2025-01-14 RX ADMIN — ISOSORBIDE MONONITRATE 120 MG: 60 TABLET, EXTENDED RELEASE ORAL at 09:01

## 2025-01-14 NOTE — PLAN OF CARE
Sw  received a call from [employer] [facility] indicating that they will be able to accept pt and will be by the hospital to visit.  SW informed case management of his status.         Discharge Plan A and Plan B have been determined by review of patient's clinical status, future medical and therapeutic needs, and coverage/benefits for post-acute care in coordination with multidisciplinary team members.    Donna Bell MSW, AMANW  [facility]  Case Management Dept.

## 2025-01-14 NOTE — PT/OT/SLP PROGRESS
Occupational Therapy   Treatment    Name: Driss Hernandez Jr.  MRN: [id number]  Admitting Diagnosis:  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       Recommendations:     Discharge Recommendations: High Intensity Therapy  Discharge Equipment Recommendations:  walker, rolling, bedside commode  Barriers to discharge:  None    Assessment:     Driss Hernandez Jr. is a 74 y.o. male with a medical diagnosis of 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.  He presents with consistent assistance needed for self-care activities, though with improved functional mobility this date. Performance deficits affecting function are weakness, gait instability, decreased upper extremity function, impaired balance, impaired endurance, decreased lower extremity function, impaired coordination, visual deficits, decreased safety awareness, impaired fine motor, impaired self care skills, impaired cognition, orthopedic precautions, impaired functional mobility, decreased coordination. Pt continues to display decreased L attention, though utilizing LUE for bimanual activities. Pt continues to display decreased problem solving and sequencing of self-care activities benefiting from significant verbal cues for sequencing. Pt noted to display mild difficulty with FMC with self-care this date. Pt also benefiting from moderate verbal and tactile cues for RW management and obstacle navigation. Pt remains motivated to participate in OT sessions to increase independence. Patient has demonstrated sufficient progression to warrant high intensity therapy evidenced by objectives noted below.      Rehab Prognosis:  Good; patient would benefit from acute skilled OT services to address these deficits and reach maximum level of function.       Plan:     Patient to be seen 4 x/week to address the above listed problems via self-care/home management, therapeutic activities, therapeutic exercises, neuromuscular re-education  Plan of  "Care Expires: 02/12/25  Plan of Care Reviewed with: patient, daughter, spouse    Subjective     Chief Complaint: "what should I do next?" (Pt asking next step of oral hygiene sequence)  Patient/Family Comments/goals: increase independence  Pain/Comfort:  Pain Rating 1: 0/10    Objective:     Communicated with: nursing prior to session.  Patient found up in chair with telemetry upon OT entry to room.    General Precautions: Standard, fall, aspiration    Orthopedic Precautions:spinal precautions  Braces: N/A  Respiratory Status: Room air     Occupational Performance:     Bed Mobility:    Not completed 2/2 pt found [facility]     Functional Mobility/Transfers:  Patient completed Sit <> Stand Transfer with contact guard assistance  with  rolling walker x2 trials; pt maintaining static standing with CGA and RW  Functional Mobility: Pt engaged in functional mobility to simulate household/community distances to and from bathroom with CGA and RW in order to maximize functional endurance and standing balance required for engagement in occupations of choice - pt benefiting from moderate verbal and tactile cues for RW management, obstacle navigation, and visual scanning       Activities of Daily Living:  Grooming: pt completing oral hygiene while static standing at sink with CGA and moderate verbal/tactile cues for activity sequencing. Pt noted to display decreased problem solving with manipulation of hand tools; pt completing hand washing with moderate verbal cues for visual scanning to identify location of hand soap as well as to terminate activity    Upper Body Dressing: moderate assistance to stefano gown as robe  Toileting: minimum assistance for clothing management to complete standing toiletting with RW      [facility] 6 Click ADL: 15    Treatment & Education:  Session this date targeted self-care and therapeutic activities to increase pt's independence  Pt educated on visual scanning with mobility and self-care to promote safety  Pt " educated on LUE AROM HEP to prevent adverse events and increase LUE strength for self-care  Pt educated on OT roles, POC, call button for assistance    Patient left up in chair with all lines intact, call button in reach, nursing notified, and family members present    GOALS:   Multidisciplinary Problems       Occupational Therapy Goals          Problem: Occupational Therapy    Goal Priority Disciplines Outcome Interventions   Occupational Therapy Goal     OT, PT/OT Progressing    Description: Goals to be met by: 2/12/2025     Patient will increase functional independence with ADLs by performing:    UE Dressing with Supervision.  LE Dressing with Supervsion.  Grooming while standing at sink with Supervision.  Toileting from toilet with Supervision for hygiene and clothing management.   Step transfer with Supervision  Toilet transfer to toilet with Supervision.  100% reciprocation and integration of 3/3 spinal precautions, DME recommendations, and ADL/task modifications post-sx to support safe d/c at highest level of function.      DME Justifications (see above for complete DME recommendations)    Rolling Walker- Patient demonstrates a mobility limitation that significantly impairs their ability to participate in one or more mobility related activities of daily living. Patient's mobility limitation cannot be sufficiently resolved with the use of a cane, but can be sufficiently resolved with the use of a rolling walker.The use of a rolling walker will considerably improve their ability to participate in MRADLs. Patient will use the walker on a regular basis at home.      Tub transfer bench - Patient demonstrates a mobility limitation that impairs their ability to participate in bathing within shower tub combination safely. Patient's limitation cannot be sufficiently resolved without the use of a tub transfer bench. The use of the tub transfer bench will considerably improve their ability to safely participate in  bathing / shower transfers. Patient will use the tub transfer bench on a regular basis at home.                         Time Tracking:     OT Date of Treatment: 01/14/25  OT Start Time: 1054  OT Stop Time: 1121  OT Total Time (min): 27 min    Billable Minutes:Self Care/Home Management 14  Therapeutic Activity 13    OT/DOROTHY: OT          1/14/2025

## 2025-01-14 NOTE — CONSULTS
Inpatient consult to Physical Medicine Rehab  Consult performed by: Allegra White NP  Consult ordered by: Pasquale Rasmussen MD      Consult received.     Allegra White NP  Physical Medicine & Rehabilitation   01/14/2025

## 2025-01-14 NOTE — NURSING TRANSFER
Nursing Transfer Note      1/14/2025   10:33 AM    Nurse giving handoff:VEENA Jeffery  Nurse receiving handoff:MARIE Quarles RN    Reason patient is being transferred: Stepdown     Transfer From: [facility] 9007    Transfer via wheelchair    Transfer with cardiac monitoring    Transported by VEENA Jeffery    Transfer Vital Signs:  Blood Pressure:149/76  Heart Rate:70  O2:97  Temperature:98.6  Respirations:24    Telemetry: Box Number [id number]  Order for Tele Monitor? Yes    Additional Lines: N/A    Medicines sent: Yes    Any special needs or follow-up needed: bed alarm, chair alarm     Patient belongings transferred with patient: Yes    Chart send with patient: Yes    Notified: spouse, daughter    Patient reassessed at: 1/14/2025 @ 1030   1  Upon arrival to floor: cardiac monitor applied, patient oriented to room, call bell in reach, and bed in lowest position

## 2025-01-14 NOTE — PROGRESS NOTES
Ghassan Romano - Neurosurgery ([facility])  Vascular Neurology  Comprehensive Stroke Center  Progress Note    Assessment/Plan:     * 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  74M. Hx HTN, HLD, Afib on Eliuquis, CAD s/p CABG, CKD3, insulin dependent diabetes. Admitted to [facility] overnight 1/9 to 1/10 following a syncopal episode w/fall, as well as encephalopathy. Seen by tele-neurology. Recommended MRI (demonstrated bilateral MCA/PCA watershed infarcts), MRA (demonstrated diminished flow in the distal BL MCA), EEG (posterior slowing), and CTA head/neck (unrevealing on 1/10). Patient exhibited an acute change this morning around 10AM, described as left hemiplegia and dysarthria. Stroke code called, and repeat CTA demonstrated R M2 occlusion. No TNK due to recent Eliquis (08:13 that AM, 20:40 the evening before). [place] [facility] [place]. Taken for [place]ogram at [facility]. DSA demonstrated recanalization of R M2, no intervention performed. Admitted to [facility] for post-procedure monitoring. 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 Eliquis and Plavix restarted. Examination with improved left-sided neglect, though is still present. 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 Stable for stepdown to NPU.    Repeat MRI brain w/o contrast to assess for stroke burden following right M2 occlusion revealing infarct extension within the right cerebral hemisphere into the frontal and temporal lobes along with development of petechial hemorrhage/hemorrhagic conversion in the right parietal lobe. Clinically, pt is improving. Stepped down to NPU.    Antithrombotics for secondary stroke prevention: Anticoagulants: Apixaban 5 mg BID , Antiplatelets: Plavix 75mg daily    Statins for secondary stroke prevention and hyperlipidemia, if present:   Statins: Atorvastatin- 80 mg daily    Aggressive risk factor modification: HTN, DM, HLD, CAD     Rehab  efforts: The patient has been evaluated by a stroke team provider and the therapy needs have been fully considered based off the presenting complaints and exam findings. The following therapy evaluations are needed: PT evaluate and treat, OT evaluate and treat, SLP evaluate and treat, PM&R evaluate for appropriate placement    VTE prophylaxis: Mechanical prophylaxis: Place SCDs    BP parameters: Infarct: Post unsucessful thrombectomy, SBP <220        Arterial ischemic stroke, multifocal, multiple vascular territories, acute  Bilateral MCA/PCA watershed territory infarcts, likely due to hypotension in setting on syncope. See primary problem.    Longstanding persistent atrial fibrillation  Stroke risk-factor. Echo on 1/10 with Afib. On Eliquis: received at 20:40 on 1/10, and at 08:13 on 1/11, both prior to acute neurologic decline (right MCA syndrome). Eliquis restarted yesterday evening.    -continue apixaban 5mg BID    Hx of CABG  Continue statin, fibrate, Plavix as detailed elsewhere.    Essential hypertension  Stroke risk-factor.    -hydralazine 50mg BID    Mixed hyperlipidemia  Stroke risk-factor. LDL 82 on admit.    -Lipitor 80mg  -fenofibrate    Type 2 diabetes mellitus with peripheral neuropathy  Stroke risk-factor. A1C 6.1% on admission.    -goal 140 to 180 while admitted  -sliding scale insulin  -accuchecks         1/12/25: taken for endovascular thrombectomy, RM2 recanalized spontaneously, examination improved today with less dense left-side neglect though is still present, Eliquis and Plavix both restarted, stable for step-down to NPU  1/13/25: NAEON. HDS, afebrile, sating well on room air. Repeat MRI revealing progression of damage due to stroke, although clinically pt is improving.   1/14/25: NAEON. HDS, afebrile, sating well on room air. Stable clinical exam. Stepped down today.       STROKE DOCUMENTATION   Acute Stroke Times   Last Known Normal Date: 01/11/25  Last Known Normal Time: 0950  Symptom  Onset Date: 01/11/25  Symptom Onset Time: 0950  Stroke Team Called Date: 01/11/25  Stroke Team Called Time: 1320  Stroke Team Arrival Date: 01/11/25  Stroke Team Arrival Time: 1320  CT Interpretation Time:  (done at OSH)  Thrombolytic Therapy Recommended: No  Thrombectomy Recommended: Yes  Decision to Treat Time for IR: 1140 (Made at OSH)    NIH Scale:  1a. Level of Consciousness: 0-->Alert, keenly responsive  1b. LOC Questions: 2-->Answers neither question correctly  1c. LOC Commands: 0-->Performs both tasks correctly  2. Best Gaze: 0-->Normal  3. Visual: 0-->No visual loss  4. Facial Palsy: 1-->Minor paralysis (flattened nasolabial fold, asymmetry on smiling)  5a. Motor Arm, Left: 1-->Drift, limb holds 90 (or 45) degrees, but drifts down before full 10 seconds, does not hit bed or other support  5b. Motor Arm, Right: 0-->No drift, limb holds 90 (or 45) degrees for full 10 secs  6a. Motor Leg, Left: 1-->Drift, leg falls by the end of the 5-sec period but does not hit bed  6b. Motor Leg, Right: 0-->No drift, leg holds 30 degree position for full 5 secs  7. Limb Ataxia: 0-->Absent  8. Sensory: 2-->Severe to total sensory loss, patient is not aware of being touched in the face, arm, and leg  9. Best Language: 0-->No aphasia, normal  10. Dysarthria: 0-->Normal  11. Extinction and Inattention (formerly Neglect): 1-->Visual, tactile, auditory, spatial, or personal inattention or extinction to bilateral simultaneous stimulation in one of the sensory modalities  Total (NIH Stroke Scale): 8       Modified [place] Score: 4  Derick Coma Scale:    ABCD2 Score:    [id number] Score:   HAS -BLED Score:   ICH Score:   Hunt & Zamudio Classification:      Hemorrhagic change of an Ischemic Stroke: Does this patient have an ischemic stroke with hemorrhagic changes? No     Neurologic Chief Complaint: bilateral MCA/PCA watershed infarcts 1/10 and R MCA infarct 1/12    Subjective:     Interval History: Patient is seen for follow-up  neurological assessment and treatment recommendations: KIARAON. HDS, afebrile, sating well on room air. Stable clinical exam. Stepped down today.       HPI, Past Medical, Family, and Social History remains the same as documented in the initial encounter.     Review of Systems   Constitutional:  Negative for fever.   HENT:  Negative for congestion.    Eyes:  Negative for visual disturbance.   Respiratory:  Negative for shortness of breath.    Cardiovascular:  Negative for chest pain.   Gastrointestinal:  Negative for abdominal distention and abdominal pain.   Endocrine: Negative for polyuria.   Genitourinary:  Negative for difficulty urinating.   Neurological:  Negative for dizziness and headaches.     Scheduled Meds:   apixaban  5 mg Oral BID    atorvastatin  80 mg Oral QHS    clopidogreL  75 mg Oral Daily    fenofibrate  48 mg Oral Daily    fluticasone propionate  2 spray Each Nostril BID    hydrALAZINE  50 mg Oral Q12H    isosorbide mononitrate  120 mg Oral Daily    mupirocin   Nasal BID    pantoprazole  40 mg Oral Daily    polyethylene glycol  17 g Oral BID    senna-docusate 8.6-50 mg  2 tablet Oral BID    tamsulosin  0.4 mg Oral Daily     Continuous Infusions:  PRN Meds:  Current Facility-Administered Medications:     acetaminophen, 650 mg, Oral, Q6H PRN    aluminum-magnesium hydroxide-simethicone, 30 mL, Oral, QID PRN    bisacodyL, 10 mg, Rectal, Daily PRN    carboxymethylcellulose sodium, 1 drop, Ophthalmic, QID PRN    dextrose 50%, 12.5 g, Intravenous, PRN    dextrose 50%, 25 g, Intravenous, PRN    glucagon (human recombinant), 1 mg, Intramuscular, PRN    glucose, 16 g, Oral, PRN    glucose, 24 g, Oral, PRN    hydrALAZINE, 10 mg, Intravenous, Q4H PRN    hydrocortisone, , Topical (Top), BID PRN    hydrOXYzine HCL, 25 mg, Oral, TID PRN    insulin aspart U-100, 0-10 Units, Subcutaneous, QID (AC + HS) PRN    labetalol, 10 mg, Intravenous, Q4H PRN    ondansetron, 4 mg, Intravenous, Q4H PRN    oxyCODONE, 5 mg, Oral,  Q4H PRN    sodium chloride 0.9%, 10 mL, Intravenous, PRN    Objective:     Vital Signs (Most Recent):  Temp: 98.2 °F (36.8 °C) (01/14/25 1210)  Pulse: 74 (01/14/25 1210)  Resp: 18 (01/14/25 1210)  BP: 121/62 (01/14/25 1210)  SpO2: 98 % (01/14/25 1210)  BP Location: Right arm    Vital Signs Range (Last 24H):  Temp:  [97.9 °F (36.6 °C)-98.7 °F (37.1 °C)]   Pulse:  [54-92]   Resp:  [16-28]   BP: (117-209)/()   SpO2:  [93 %-99 %]   BP Location: Right arm       Physical Exam  Constitutional:       General: He is not in acute distress.  HENT:      Head: Normocephalic and atraumatic.   Eyes:      Extraocular Movements: Extraocular movements intact.      Conjunctiva/sclera: Conjunctivae normal.      Pupils: Pupils are equal, round, and reactive to light.   Cardiovascular:      Rate and Rhythm: Normal rate and regular rhythm.      Pulses: Normal pulses.      Heart sounds: Normal heart sounds.   Pulmonary:      Breath sounds: Normal breath sounds.   Abdominal:      General: Abdomen is flat. Bowel sounds are normal. There is no distension.      Tenderness: There is no abdominal tenderness.   Musculoskeletal:         General: No swelling or tenderness.   Skin:     General: Skin is warm and dry.      Capillary Refill: Capillary refill takes less than 2 seconds.   Neurological:      Mental Status: He is alert.              Neurological Exam:   LOC: alert  Attention Span: Good   Language: No aphasia  Articulation: No dysarthria  Orientation: Person, Place, Time   Visual Fields: Full  EOM (CN III, IV, VI): Full/intact  Pupils (CN II, III): PERRL  Facial Movement (CN VII): left facial droop  Motor: Arm left  Normal 5/5  Leg left  Normal 5/5  Arm right  Normal 5/5  Leg right Normal 5/5  Sensation: severe sensory loss of left arm and leg    Laboratory:  CMP:   Recent Labs   Lab 01/14/25  0104   CALCIUM 9.1   ALBUMIN 3.3*   PROT 7.1      K 4.3   CO2 20*      BUN 23   CREATININE 1.2   ALKPHOS 36*   ALT 17   AST 28    BILITOT 0.8     CBC:   Recent Labs   Lab 01/14/25  0104   WBC 11.63   RBC 4.89   HGB 13.4*   HCT 39.9*      MCV 82   MCH 27.4   MCHC 33.6       Diagnostic Results     Brain Imaging   MRI Brain: 1/10/25  Bilateral posterior parietal cortical infarcts right greater than left.           MRA Head/Neck: 1/10/25  1. There is diminished flow signal and luminal narrowing in the distal MCA distribution bilaterally in the posterior parietal region in an area of infarction.  Vascular consultation and follow-up recommended.  CTA may better characterize.  2. There is bilateral flow signal loss in the distal vertebral arteries bilaterally of indeterminate etiology.  This could be artifact.  High-grade narrowing/diminished flow of the distal V4 segment of the left vertebral artery is a possibility.  Mild narrowing or diminished flow of the V4 segment of the right vertebral artery is not excluded also.  Basilar artery demonstrates normal flow signal.  CTA may better characterize.  Vascular consultation and follow-up recommended.  3. Posterior cerebral arteries are patent proximally bilaterally.  P1 and P2 segments are adequately demonstrated.  Probable mild narrowing in the P2 segment on the left.. Possible mild luminal narrowing and diminished flow signal in the more distal components of the PCA distribution bilaterally.     CTA Head and Neck: 1/10  1. Bilateral parietal lobe regions of acute infarction, as seen on earlier MRI.  2. CTA head and neck with no evidence of high-grade stenosis, large vessel occlusion, or dissection.  3. Approximately 50% stenosis at the right ICA origin.  4. Indeterminate small bilateral parotid enhancing lesions.     CTA Head and Neck: 1/11  Evolving parieto-occipital infarcts again identified. Irregular calcified and soft plaque at the carotid bifurcations however less than 50% stenosis by NASCET criteria. At least moderate stenosis at the origin of the vertebral arteries bilaterally.  Evaluation of the [id number] demonstrates a new right M2 branch occlusion, the occlusion is somewhat distal although the branch is quite large in size.     MRI Brain 1/12/25:   Compared to MRI 01/10/2025, there is infarct extension within the right cerebral hemisphere into the frontal and temporal lobes.  Development of petechial hemorrhage/hemorrhagic conversion in the right parietal lobe.   Similar-sized left parietal infarct with development of petechial hemorrhage.   Bilateral parotid cystic lesions, which could represent cysts or mucoceles.  Other etiologies including cystic neoplasm are not excluded.  Consider follow-up outpatient soft tissue ultrasound if clinically warranted.      Cardiac Imaging   Echo: 1/10/25    Left Ventricle: The left ventricle is normal in size. Mildly increased wall thickness. There is mild concentric hypertrophy. There is normal systolic function with a visually estimated ejection fraction of 55 - 60%. Unable to assess diastolic function due to atrial fibrillation.    Right Ventricle: Systolic function is normal.    Aortic Valve: The aortic valve is a trileaflet valve. There is moderate aortic valve sclerosis. Mildly restricted motion. There is mild stenosis. Aortic valve area by VTI is 1.9 cm². Aortic valve peak velocity is 1.5 m/s. Mean gradient is 5.3 mmHg. The dimensionless index is 0.59.    Tricuspid Valve: There is mild regurgitation.    Pulmonary Artery: The estimated pulmonary artery systolic pressure is 93 mmHg.    Pt in AFib throughout exam.      Sunita Sahu MD  Comprehensive Stroke Center  Department of Vascular Neurology   [facility])

## 2025-01-14 NOTE — PT/OT/SLP PROGRESS
"Speech Language Pathology Treatment    Patient Name:  Driss Hernandez Jr.   MRN:  [id number]  Admitting Diagnosis: 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    Recommendations:                 General Recommendations:  Speech/language therapy and Cognitive-linguistic therapy  Diet recommendations:  Regular Diet - IDDSI Level 7, Liquid Diet Level: Thin liquids - IDDSI Level 0   Aspiration Precautions: 1 bite/sip at a time, Alternating bites/sips, Avoid talking while eating, Check for pocketing/oral residue - utilize liquid wash and/or finger sweep to clear, Eliminate distractions, Feed only when awake/alert, Frequent oral care, HOB to 90 degrees, Monitor for s/s of aspiration, Small bites/sips, and Strict aspiration precautions   General Precautions: Standard, aspiration, fall, vision impaired (left neglect)  Communication strategies:  provide increased time to answer and go to room if call light pushed    Assessment:     Driss Hernandez Jr. is a 74 y.o. male with an SLP diagnosis of Dysphagia, Cognitive-Linguistic Impairment, and Visio-Spatial Impairment.      Subjective     "I probably won't remember anything you said." Pt commented when SLP stated plan to follow up tomorrow for continued therapy.     Pain/Comfort:  Pain Rating 1: 0/10    Respiratory Status: Room air    Objective:     Has the patient been evaluated by SLP for swallowing?   Yes  Keep patient NPO? No   Current Respiratory Status:        Pt was seen for ongoing cognitive-linguistic tx and assessment of visual spatial/attention skills.  Pt stepped down to NPU today, sitting upright in recliner chair. Family at bedside.  Pt was oriented to situation IND, to place and month given cues, and year with utilization of an external aid.  Pt was unable to participate in reading assessment at the paragraph level.  When presented with phrases and short sentences, pt displayed left neglect/hemianopsia by omitting first word at left margin and " producing reading errors as pt scanned left to right to read.  Following a 2 minute delay, pt recalled 2/3 unrelated words given cues (1/3 IND). Pt completed problem solving tasks stating possible effects of problem situations with 75% accuracy IND/100% given cues.  Pt's family member reported that pt continues to exhibit left side pocketing.  Oral [facility] exam continues to reveal no significant oral motor weakness or asymmetry, but decreased perception and sensory awareness on the left side evident.  Pt was not able to correctly locate precise location when SLP touched on left side of face.  Education was provided to pt and family regarding consideration of modifying diet if appears necessary, but further explained that pt likely needs to utilize compensatory strategies to manage/eliminate pocketing on any oral diet due to decreased sensory awareness.  These compensatory strategies were discussed and demonstrated. Further education was also provided regarding left neglect and visual-spatial and perceptual impairments s/p stroke.  Family expressed understanding. Pt will benefit from continued reinforcement.     Goals:   Multidisciplinary Problems       SLP Goals          Problem: SLP    Goal Priority Disciplines Outcome   SLP Goal     SLP    Description: Goals expected to be met by 1/19:  1. Pt will tolerate least restrictive diet without overt s/sx airway threat.    2. Pt will participate in cognitive-linguistic evaluation to further develop POC. Goal met/initiated 1/13:  ADDITIONAL GOALS:   2. Pt will O x 4 given mod-max cues and/or external aid.  3. Pt will recall general information with 80% accuracy given min cues.   4. Pt will recall 3/3 items after a 2 minute delay given mod-max cues.   5. Pt will complete problem solving/reasoning tasks with 60% accuracy given mod cues.   6. Pt will list an average of 11 items in a category in one minute given min cues.                            Plan:     Patient to be seen:   4 x/week   Plan of Care expires:  02/10/25  Plan of Care reviewed with:  patient, spouse, family   SLP Follow-Up:  Yes       Discharge recommendations:  High Intensity Therapy     Time Tracking:     SLP Treatment Date:   01/14/25  Speech Start Time:  1402  Speech Stop Time:  1425     Speech Total Time (min):  23 min    Billable Minutes: Speech Therapy Individual (cognitive therapy) 13 and Self Care/Home Management Training 10    01/14/2025

## 2025-01-14 NOTE — PT/OT/SLP PROGRESS
"Physical Therapy Treatment    Patient Name:  Driss Hernandez Jr.   MRN:  [id number]    Recommendations:     Discharge Recommendations: High Intensity Therapy  Discharge Equipment Recommendations: walker, rolling, bedside commode  Barriers to discharge:  Increased level of assistance    Assessment:     Driss Hernandez Jr. is a 74 y.o. male admitted with a medical diagnosis of 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.  He presents with the following impairments/functional limitations: weakness, gait instability, decreased upper extremity function, impaired balance, impaired endurance, decreased lower extremity function, decreased coordination, visual deficits, decreased safety awareness, impaired fine motor, impaired self care skills, impaired cognition, orthopedic precautions, impaired functional mobility.    Pt met sitting up in bedside chair and agreeable to session. Pt tolerates session well with emphasis on transfers and gait training. Pt making progress towards therapy goals as indicated by decreased assistance required with gait trials and increased total ambulation distance with the use of RW. Pt continues to be limited by L inattention and motor apraxia requiring increased cueing when navigating obstacles on L side. Verbal cues also required to keep LUE on RW. Pt is not at PLOF and  will continue to benefit from skilled therapy services.    Rehab Prognosis: Good; patient would benefit from acute skilled PT services to address these deficits and reach maximum level of function.    Recent Surgery: Procedure(s) (LRB):  ANGIOGRAM-CEREBRAL (N/A)      Plan:     During this hospitalization, patient to be seen 4 x/week to address the identified rehab impairments via gait training, therapeutic activities, therapeutic exercises, neuromuscular re-education and progress toward the following goals:    Plan of Care Expires:  02/12/25    Subjective     Chief Complaint: "I'm really cold"  Patient/Family " "Comments/goals: "ok, what next"  Pain/Comfort:  Pain Rating 1: 3/10  Location - Side 1: Bilateral  Location - Orientation 1: generalized  Location 1: back  Pain Addressed 1: Reposition, Distraction  Pain Rating Post-Intervention 1: 3/10      Objective:     Communicated with RN (Willam) prior to session.  Patient found up in chair with telemetry and family present upon PTA entry to room.     General Precautions: Standard, fall, aspiration  Orthopedic Precautions: spinal precautions  Braces: N/A  Respiratory Status: Room air     Functional Mobility:  Transfers:     Sit to Stand:  x1 from bedside chair contact guard assistance with rolling walker  Verbal cues required to place LUE on RW.   Gait:   Pt ambulates x2 trials 30 feet each  with contact guard assistance and rolling walker  Deviations noted: unsteady gait, decreased step length, decrease ivania, decreased gait speed, difficulty navigating objects on L  Verbal and tactile cue required to assist with navigating obstacles/managing motor apraxia with pt stating "what now" with cueing to navigate around obstacles  All lines remained intact and gait belt utilized.    AM-PAC 6 CLICK MOBILITY  Turning over in bed (including adjusting bedclothes, sheets and blankets)?: 3  Sitting down on and standing up from a chair with arms (e.g., wheelchair, bedside commode, etc.): 3  Moving from lying on back to sitting on the side of the bed?: 2  Moving to and from a bed to a chair (including a wheelchair)?: 3  Need to walk in hospital room?: 3  Climbing 3-5 steps with a railing?: 2  Basic Mobility Total Score: 16       Treatment & Education:  Patient provided with daily orientation and goals of this PT session.     Pt educated on appropriate BLE therex to perform while seated up in bedside chair:  X10 AP, LAQ, Hip flexion    Pt educated to call for assistance and to transfer with hospital staff only.  Also, pt was educated on the effects of prolonged immobility and the " importance of performing OOB activity and exercises to promote healing and reduce recovery time.    Patient left up in chair with all lines intact, call button in reach, RN notified, and family present.    GOALS:   Multidisciplinary Problems       Physical Therapy Goals          Problem: Physical Therapy    Goal Priority Disciplines Outcome Interventions   Physical Therapy Goal     PT, PT/OT Progressing    Description: Goals to be met by: 25     Patient will increase functional independence with mobility by performin. Supine to sit with MInimal Assistance  2. Sit to supine with MInimal Assistance  3. Sit to stand transfer with Supervision  4. Bed to chair transfer with Supervision using LRAD as needed  5. Gait  x 150 feet with Supervision using LRAD as needed.   6. Lower extremity exercise program x10 reps per handout, with assistance as needed    DME Justifications (see above for complete DME recommendations)    Bedside Commode- Patient has a mobility limitation that significantly impairs their ability to participate in one or more mobility related activities of daily living, including toileting. This deficit can be resolved by using a bedside commode. Patient demonstrates mobility limitations that will cause them to be confined to one room at home without bathroom access for up to 30 days. Using a bedside commode will greatly improve the patient's ability to participate in MRADLs.     Rolling Walker- Patient demonstrates a mobility limitation that significantly impairs their ability to participate in one or more mobility related activities of daily living. Patient's mobility limitation cannot be sufficiently resolved with the use of a cane, but can be sufficiently resolved with the use of a rolling walker.The use of a rolling walker will considerably improve their ability to participate in MRADLs. Patient will use the walker on a regular basis at home.                               Time Tracking:      PT Received On: 01/14/25  PT Start Time: 1300     PT Stop Time: 1313  PT Total Time (min): 13 min     Billable Minutes: Gait Training 13    Treatment Type: Treatment  PT/PTA: PTA     Number of PTA visits since last PT visit: 1 01/14/2025

## 2025-01-14 NOTE — PLAN OF CARE
"[facility] Care Plan  POC reviewed with Driss Hernandez Jr. and family at 1400. Patient verbalized understanding. Questions and concerns addressed. No acute events today. Pt progressing toward goals. Will continue to monitor. See below and flowsheets for full assessment and VS info.             Is this a stroke patient? yes- Stroke booklet reviewed with patient, risk factors identified for patient and stroke booklet remains at bedside for ongoing education.     Care individualization: pt likes room temp at 72    Neuro:  Derick Coma Scale  Best Eye Response: 4-->(E4) spontaneous  Best Motor Response: 6-->(M6) obeys commands  Best Verbal Response: 4-->(V4) confused  Derick Coma Scale Score: 14  Assessment Qualifiers: patient not sedated/intubated  Pupil PERRLA: yes     24 hr Temp:  [97.6 °F (36.4 °C)-98.1 °F (36.7 °C)]     CV:   Rhythm: atrial rhythm  BP goals:   SBP < 180  MAP > 65    Resp:           Plan: N/A    GI/:     Diet/Nutrition Received: consistent carb/diabetic diet  Last Bowel Movement: 01/13/25  Voiding Characteristics: external catheter    Intake/Output Summary (Last 24 hours) at 1/13/2025 1843  Last data filed at 1/13/2025 1813  Gross per 24 hour   Intake 1292.56 ml   Output 2000 ml   Net -707.44 ml     Unmeasured Output  Urine Occurrence: 1  Stool Occurrence: 1  Emesis Occurrence: 0  Pad Count: 0    Labs/Accuchecks:  Recent Labs   Lab 01/13/25  0124   WBC 7.63   RBC 4.85   HGB 13.2*   HCT 39.5*         Recent Labs   Lab 01/13/25  0124   *   K 4.1   CO2 17*      BUN 22   CREATININE 1.3   ALKPHOS 30*   ALT 14   AST 20   BILITOT 0.6      Recent Labs   Lab 01/11/25  1733   INR 1.2   APTT 29.5    No results for input(s): "CPK", "CPKMB", "TROPONINI", "MB" in the last 168 hours.    Electrolytes: N/A - electrolytes WDL  Accuchecks: ACHS    Gtts:      LDA/Wounds:    Garfield Risk Assessment  Sensory Perception: 3-->slightly limited  Moisture: 4-->rarely moist  Activity: 1-->bedfast  Mobility: " 3-->slightly limited  Nutrition: 3-->adequate  Friction and Shear: 3-->no apparent problem  Garfield Score: 17    Is your garfield score 12 or less? no            Restraints:        WCTM   Problem: Adult Inpatient Plan of Care  Goal: Optimal Comfort and Wellbeing  Outcome: Progressing     Problem: Fall Injury Risk  Goal: Absence of Fall and Fall-Related Injury  Outcome: Progressing     Problem: Stroke, Ischemic (Includes Transient Ischemic Attack)  Goal: Effective Bowel Elimination  Outcome: Progressing  Goal: Optimal Cerebral Tissue Perfusion  Outcome: Progressing

## 2025-01-14 NOTE — PLAN OF CARE
A&O x4, able to make needs known and using call light. VSS this shift; monitoring BP. Denies CP/SOB, resting comfortably on RA. Afib controlled on Telemetry. Up in chair majority of today-- worked with PT/OT. Ambulates to bathroom with walker ans SBA. Started magic mouthwash for ulcer in mouth and Zyrtec for drainage in his eyes-- CTM. Family at bedside attentive to patient and participating in care. Stroke education provided to patient and booklet at bedside; family involved in education and asking questions. All questions answered, needs met and safety checks preformed.       Problem: Adult Inpatient Plan of Care  Goal: Optimal Comfort and Wellbeing  Outcome: Progressing     Problem: Infection  Goal: Absence of Infection Signs and Symptoms  Outcome: Progressing     Problem: Fall Injury Risk  Goal: Absence of Fall and Fall-Related Injury  Outcome: Progressing

## 2025-01-14 NOTE — SUBJECTIVE & OBJECTIVE
Neurologic Chief Complaint: bilateral MCA/PCA watershed infarcts 1/10 and R MCA infarct 1/12    Subjective:     Interval History: Patient is seen for follow-up neurological assessment and treatment recommendations: NAEON. HDS, afebrile, sating well on room air. Stable clinical exam. Stepped down today.       HPI, Past Medical, Family, and Social History remains the same as documented in the initial encounter.     Review of Systems   Constitutional:  Negative for fever.   HENT:  Negative for congestion.    Eyes:  Negative for visual disturbance.   Respiratory:  Negative for shortness of breath.    Cardiovascular:  Negative for chest pain.   Gastrointestinal:  Negative for abdominal distention and abdominal pain.   Endocrine: Negative for polyuria.   Genitourinary:  Negative for difficulty urinating.   Neurological:  Negative for dizziness and headaches.     Scheduled Meds:   apixaban  5 mg Oral BID    atorvastatin  80 mg Oral QHS    clopidogreL  75 mg Oral Daily    fenofibrate  48 mg Oral Daily    fluticasone propionate  2 spray Each Nostril BID    hydrALAZINE  50 mg Oral Q12H    isosorbide mononitrate  120 mg Oral Daily    mupirocin   Nasal BID    pantoprazole  40 mg Oral Daily    polyethylene glycol  17 g Oral BID    senna-docusate 8.6-50 mg  2 tablet Oral BID    tamsulosin  0.4 mg Oral Daily     Continuous Infusions:  PRN Meds:  Current Facility-Administered Medications:     acetaminophen, 650 mg, Oral, Q6H PRN    aluminum-magnesium hydroxide-simethicone, 30 mL, Oral, QID PRN    bisacodyL, 10 mg, Rectal, Daily PRN    carboxymethylcellulose sodium, 1 drop, Ophthalmic, QID PRN    dextrose 50%, 12.5 g, Intravenous, PRN    dextrose 50%, 25 g, Intravenous, PRN    glucagon (human recombinant), 1 mg, Intramuscular, PRN    glucose, 16 g, Oral, PRN    glucose, 24 g, Oral, PRN    hydrALAZINE, 10 mg, Intravenous, Q4H PRN    hydrocortisone, , Topical (Top), BID PRN    hydrOXYzine HCL, 25 mg, Oral, TID PRN    insulin aspart  U-100, 0-10 Units, Subcutaneous, QID (AC + HS) PRN    labetalol, 10 mg, Intravenous, Q4H PRN    ondansetron, 4 mg, Intravenous, Q4H PRN    oxyCODONE, 5 mg, Oral, Q4H PRN    sodium chloride 0.9%, 10 mL, Intravenous, PRN    Objective:     Vital Signs (Most Recent):  Temp: 98.2 °F (36.8 °C) (01/14/25 1210)  Pulse: 74 (01/14/25 1210)  Resp: 18 (01/14/25 1210)  BP: 121/62 (01/14/25 1210)  SpO2: 98 % (01/14/25 1210)  BP Location: Right arm    Vital Signs Range (Last 24H):  Temp:  [97.9 °F (36.6 °C)-98.7 °F (37.1 °C)]   Pulse:  [54-92]   Resp:  [16-28]   BP: (117-209)/()   SpO2:  [93 %-99 %]   BP Location: Right arm       Physical Exam  Constitutional:       General: He is not in acute distress.  HENT:      Head: Normocephalic and atraumatic.   Eyes:      Extraocular Movements: Extraocular movements intact.      Conjunctiva/sclera: Conjunctivae normal.      Pupils: Pupils are equal, round, and reactive to light.   Cardiovascular:      Rate and Rhythm: Normal rate and regular rhythm.      Pulses: Normal pulses.      Heart sounds: Normal heart sounds.   Pulmonary:      Breath sounds: Normal breath sounds.   Abdominal:      General: Abdomen is flat. Bowel sounds are normal. There is no distension.      Tenderness: There is no abdominal tenderness.   Musculoskeletal:         General: No swelling or tenderness.   Skin:     General: Skin is warm and dry.      Capillary Refill: Capillary refill takes less than 2 seconds.   Neurological:      Mental Status: He is alert.              Neurological Exam:   LOC: alert  Attention Span: Good   Language: No aphasia  Articulation: No dysarthria  Orientation: Person, Place, Time   Visual Fields: Full  EOM (CN III, IV, VI): Full/intact  Pupils (CN II, III): PERRL  Facial Movement (CN VII): left facial droop  Motor: Arm left  Normal 5/5  Leg left  Normal 5/5  Arm right  Normal 5/5  Leg right Normal 5/5  Sensation: severe sensory loss of left arm and leg    Laboratory:  CMP:   Recent  Labs   Lab 01/14/25  0104   CALCIUM 9.1   ALBUMIN 3.3*   PROT 7.1      K 4.3   CO2 20*      BUN 23   CREATININE 1.2   ALKPHOS 36*   ALT 17   AST 28   BILITOT 0.8     CBC:   Recent Labs   Lab 01/14/25  0104   WBC 11.63   RBC 4.89   HGB 13.4*   HCT 39.9*      MCV 82   MCH 27.4   MCHC 33.6       Diagnostic Results     Brain Imaging   MRI Brain: 1/10/25  Bilateral posterior parietal cortical infarcts right greater than left.           MRA Head/Neck: 1/10/25  1. There is diminished flow signal and luminal narrowing in the distal MCA distribution bilaterally in the posterior parietal region in an area of infarction.  Vascular consultation and follow-up recommended.  CTA may better characterize.  2. There is bilateral flow signal loss in the distal vertebral arteries bilaterally of indeterminate etiology.  This could be artifact.  High-grade narrowing/diminished flow of the distal V4 segment of the left vertebral artery is a possibility.  Mild narrowing or diminished flow of the V4 segment of the right vertebral artery is not excluded also.  Basilar artery demonstrates normal flow signal.  CTA may better characterize.  Vascular consultation and follow-up recommended.  3. Posterior cerebral arteries are patent proximally bilaterally.  P1 and P2 segments are adequately demonstrated.  Probable mild narrowing in the P2 segment on the left.. Possible mild luminal narrowing and diminished flow signal in the more distal components of the PCA distribution bilaterally.     CTA Head and Neck: 1/10  1. Bilateral parietal lobe regions of acute infarction, as seen on earlier MRI.  2. CTA head and neck with no evidence of high-grade stenosis, large vessel occlusion, or dissection.  3. Approximately 50% stenosis at the right ICA origin.  4. Indeterminate small bilateral parotid enhancing lesions.     CTA Head and Neck: 1/11  Evolving parieto-occipital infarcts again identified. Irregular calcified and soft plaque at  the carotid bifurcations however less than 50% stenosis by NASCET criteria. At least moderate stenosis at the origin of the vertebral arteries bilaterally. Evaluation of the [id number] demonstrates a new right M2 branch occlusion, the occlusion is somewhat distal although the branch is quite large in size.     MRI Brain 1/12/25:   Compared to MRI 01/10/2025, there is infarct extension within the right cerebral hemisphere into the frontal and temporal lobes.  Development of petechial hemorrhage/hemorrhagic conversion in the right parietal lobe.   Similar-sized left parietal infarct with development of petechial hemorrhage.   Bilateral parotid cystic lesions, which could represent cysts or mucoceles.  Other etiologies including cystic neoplasm are not excluded.  Consider follow-up outpatient soft tissue ultrasound if clinically warranted.      Cardiac Imaging   Echo: 1/10/25    Left Ventricle: The left ventricle is normal in size. Mildly increased wall thickness. There is mild concentric hypertrophy. There is normal systolic function with a visually estimated ejection fraction of 55 - 60%. Unable to assess diastolic function due to atrial fibrillation.    Right Ventricle: Systolic function is normal.    Aortic Valve: The aortic valve is a trileaflet valve. There is moderate aortic valve sclerosis. Mildly restricted motion. There is mild stenosis. Aortic valve area by VTI is 1.9 cm². Aortic valve peak velocity is 1.5 m/s. Mean gradient is 5.3 mmHg. The dimensionless index is 0.59.    Tricuspid Valve: There is mild regurgitation.    Pulmonary Artery: The estimated pulmonary artery systolic pressure is 93 mmHg.    Pt in AFib throughout exam.

## 2025-01-14 NOTE — ASSESSMENT & PLAN NOTE
74M. Hx HTN, HLD, Afib on Eliuquis, CAD s/p CABG, CKD3, insulin dependent diabetes. Admitted to [facility] overnight 1/9 to 1/10 following a syncopal episode w/fall, as well as encephalopathy. Seen by tele-neurology. Recommended MRI (demonstrated bilateral MCA/PCA watershed infarcts), MRA (demonstrated diminished flow in the distal BL MCA), EEG (posterior slowing), and CTA head/neck (unrevealing on 1/10). Patient exhibited an acute change this morning around 10AM, described as left hemiplegia and dysarthria. Stroke code called, and repeat CTA demonstrated R M2 occlusion. No TNK due to recent Eliquis (08:13 that AM, 20:40 the evening before). [place] [facility]. [place] [facility] DSA demonstrated recanalization of R M2, no intervention performed. Admitted to [facility] for post-procedure monitoring. 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 Eliquis and Plavix restarted. Examination with improved left-sided neglect, though is still present. 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 Stable for stepdown to NPU.    Repeat MRI brain w/o contrast to assess for stroke burden following right M2 occlusion revealing infarct extension within the right cerebral hemisphere into the frontal and temporal lobes along with development of petechial hemorrhage/hemorrhagic conversion in the right parietal lobe. Clinically, pt is improving. Stepped down to NPU.    Antithrombotics for secondary stroke prevention: Anticoagulants: Apixaban 5 mg BID , Antiplatelets: Plavix 75mg daily    Statins for secondary stroke prevention and hyperlipidemia, if present:   Statins: Atorvastatin- 80 mg daily    Aggressive risk factor modification: HTN, DM, HLD, CAD     Rehab efforts: The patient has been evaluated by a stroke team provider and the therapy needs have been fully considered based off the presenting complaints and exam findings. The following  therapy evaluations are needed: PT evaluate and treat, OT evaluate and treat, SLP evaluate and treat, PM&R evaluate for appropriate placement    VTE prophylaxis: Mechanical prophylaxis: Place SCDs    BP parameters: Infarct: Post unsucessful thrombectomy, SBP <220

## 2025-01-14 NOTE — PLAN OF CARE
"[facility] Care Plan    POC reviewed with Driss Hernandez Jr. at 0130. Patient verbalized understanding. Questions and concerns addressed. No acute events today. Pt progressing toward goals. Will continue to monitor. See below and flowsheets for full assessment and VS info.     - Tylenol x1 for 7/10 headache  - Oxycodone x1 for 7/10 low back pain  - Labetalol IV PRN x1 for SBP >180  - Pending for step down    Is this a stroke patient? yes- Stroke booklet reviewed with family, risk factors identified for patient and stroke booklet remains at bedside for ongoing education.     Care individualization: Pt likes to be informed of time.    Neuro:  Hastings Coma Scale  Best Eye Response: 4-->(E4) spontaneous  Best Motor Response: 6-->(M6) obeys commands  Best Verbal Response: 4-->(V4) confused  Hastings Coma Scale Score: 14  Assessment Qualifiers: patient not sedated/intubated  Pupil PERRLA: yes     24 hr Temp:  [97.6 °F (36.4 °C)-98 °F (36.7 °C)]     CV:   Rhythm: atrial rhythm  BP goals:   SBP < 180  MAP > 65    Resp: RA    Plan: N/A    GI/:     Diet/Nutrition Received: consistent carb/diabetic diet  Last Bowel Movement: 01/13/25  Voiding Characteristics: incontinence, external catheter    Intake/Output Summary (Last 24 hours) at 1/14/2025 0219  Last data filed at 1/14/2025 0201  Gross per 24 hour   Intake 1142.56 ml   Output 1950 ml   Net -807.44 ml     Unmeasured Output  Urine Occurrence: 1  Stool Occurrence: 1  Emesis Occurrence: 0  Pad Count: 0    Labs/Accuchecks:  Recent Labs   Lab 01/14/25  0104   WBC 11.63   RBC 4.89   HGB 13.4*   HCT 39.9*         Recent Labs   Lab 01/14/25  0104      K 4.3   CO2 20*      BUN 23   CREATININE 1.2   ALKPHOS 36*   ALT 17   AST 28   BILITOT 0.8      Recent Labs   Lab 01/11/25  1733   INR 1.2   APTT 29.5    No results for input(s): "CPK", "CPKMB", "TROPONINI", "MB" in the last 168 hours.    Electrolytes: N/A - electrolytes WDL  Accuchecks: " ACHS    Gtts:      LDA/Wounds:    Garfield Risk Assessment  Sensory Perception: 3-->slightly limited  Moisture: 4-->rarely moist  Activity: 2-->chairfast  Mobility: 3-->slightly limited  Nutrition: 3-->adequate  Friction and Shear: 3-->no apparent problem  Garfield Score: 18  Is your garfield score 12 or less? no    Restraints: N/A    WCTM      Problem: Adult Inpatient Plan of Care  Goal: Plan of Care Review  Outcome: Progressing  Goal: Optimal Comfort and Wellbeing  Outcome: Progressing     Problem: Fall Injury Risk  Goal: Absence of Fall and Fall-Related Injury  Outcome: Progressing     Problem: Skin Injury Risk Increased  Goal: Skin Health and Integrity  Outcome: Progressing     Problem: Pain Acute  Goal: Optimal Pain Control and Function  Outcome: Progressing     Problem: Diabetes Comorbidity  Goal: Blood Glucose Level Within Targeted Range  Outcome: Progressing     Problem: Wound  Goal: Skin Health and Integrity  Outcome: Progressing  Goal: Optimal Wound Healing  Outcome: Progressing     Problem: Stroke, Ischemic (Includes Transient Ischemic Attack)  Goal: Optimal Coping  Outcome: Progressing  Goal: Effective Bowel Elimination  Outcome: Progressing  Goal: Optimal Cerebral Tissue Perfusion  Outcome: Progressing  Goal: Optimal Cognitive Function  Outcome: Progressing  Goal: Optimal Functional Ability  Outcome: Progressing  Goal: Optimal Nutrition Intake  Outcome: Progressing  Goal: Improved Sensorimotor Function  Outcome: Progressing  Goal: Safe and Effective Swallow  Outcome: Progressing  Goal: Effective Urinary Elimination  Outcome: Progressing

## 2025-01-15 ENCOUNTER — ANESTHESIA (OUTPATIENT)
Dept: SURGERY | Facility: HOSPITAL | Age: 75
End: 2025-01-15
Payer: MEDICARE

## 2025-01-15 VITALS
HEIGHT: 67 IN | RESPIRATION RATE: 16 BRPM | SYSTOLIC BLOOD PRESSURE: 103 MMHG | OXYGEN SATURATION: 96 % | WEIGHT: 183 LBS | DIASTOLIC BLOOD PRESSURE: 57 MMHG | HEART RATE: 61 BPM | BODY MASS INDEX: 28.72 KG/M2 | TEMPERATURE: 97 F

## 2025-01-15 PROBLEM — N17.9 AKI (ACUTE KIDNEY INJURY): Status: ACTIVE | Noted: 2025-01-15

## 2025-01-15 PROBLEM — I48.91 A-FIB: Status: ACTIVE | Noted: 2025-01-15

## 2025-01-15 PROBLEM — G81.90 HEMIPARESIS: Status: ACTIVE | Noted: 2025-01-15

## 2025-01-15 PROBLEM — I48.91 HYPERCOAGULABLE STATE DUE TO ATRIAL FIBRILLATION: Status: ACTIVE | Noted: 2025-01-15

## 2025-01-15 PROBLEM — E87.1 HYPONATREMIA: Status: ACTIVE | Noted: 2025-01-15

## 2025-01-15 PROBLEM — R47.01 APHASIA: Status: ACTIVE | Noted: 2025-01-15

## 2025-01-15 PROBLEM — R20.0 SENSORY LOSS: Status: ACTIVE | Noted: 2025-01-15

## 2025-01-15 PROBLEM — D68.69 HYPERCOAGULABLE STATE DUE TO ATRIAL FIBRILLATION: Status: ACTIVE | Noted: 2025-01-15

## 2025-01-15 LAB
POCT GLUCOSE: 136 MG/DL (ref 70–110)
POCT GLUCOSE: 252 MG/DL (ref 70–110)

## 2025-01-15 PROCEDURE — 97535 SELF CARE MNGMENT TRAINING: CPT | Mod: HCNC

## 2025-01-15 PROCEDURE — 25000003 PHARM REV CODE 250: Mod: HCNC

## 2025-01-15 PROCEDURE — 25000003 PHARM REV CODE 250: Mod: HCNC | Performed by: PSYCHIATRY & NEUROLOGY

## 2025-01-15 PROCEDURE — 99233 SBSQ HOSP IP/OBS HIGH 50: CPT | Mod: HCNC,GC,, | Performed by: STUDENT IN AN ORGANIZED HEALTH CARE EDUCATION/TRAINING PROGRAM

## 2025-01-15 PROCEDURE — 97112 NEUROMUSCULAR REEDUCATION: CPT | Mod: HCNC

## 2025-01-15 RX ORDER — AMOXICILLIN 250 MG
2 CAPSULE ORAL 2 TIMES DAILY
Start: 2025-01-15 | End: 2025-02-10

## 2025-01-15 RX ORDER — TAMSULOSIN HYDROCHLORIDE 0.4 MG/1
0.4 CAPSULE ORAL DAILY
Start: 2025-01-16 | End: 2025-02-26

## 2025-01-15 RX ORDER — INSULIN ASPART 100 [IU]/ML
0-10 INJECTION, SOLUTION INTRAVENOUS; SUBCUTANEOUS
Start: 2025-01-15 | End: 2025-02-10

## 2025-01-15 RX ADMIN — ISOSORBIDE MONONITRATE 120 MG: 60 TABLET, EXTENDED RELEASE ORAL at 08:01

## 2025-01-15 RX ADMIN — APIXABAN 5 MG: 5 TABLET, FILM COATED ORAL at 08:01

## 2025-01-15 RX ADMIN — MUPIROCIN: 20 OINTMENT TOPICAL at 08:01

## 2025-01-15 RX ADMIN — CLOPIDOGREL BISULFATE 75 MG: 75 TABLET ORAL at 08:01

## 2025-01-15 RX ADMIN — PANTOPRAZOLE SODIUM 40 MG: 20 TABLET, DELAYED RELEASE ORAL at 08:01

## 2025-01-15 RX ADMIN — FLUTICASONE PROPIONATE 100 MCG: 50 SPRAY, METERED NASAL at 08:01

## 2025-01-15 RX ADMIN — TAMSULOSIN HYDROCHLORIDE 0.4 MG: 0.4 CAPSULE ORAL at 08:01

## 2025-01-15 RX ADMIN — ACETAMINOPHEN 650 MG: 325 TABLET ORAL at 08:01

## 2025-01-15 RX ADMIN — CETIRIZINE HYDROCHLORIDE 10 MG: 5 TABLET, FILM COATED ORAL at 08:01

## 2025-01-15 RX ADMIN — FENOFIBRATE 48 MG: 48 TABLET, FILM COATED ORAL at 08:01

## 2025-01-15 RX ADMIN — HYDRALAZINE HYDROCHLORIDE 50 MG: 25 TABLET ORAL at 08:01

## 2025-01-15 NOTE — DISCHARGE SUMMARY
Ghassan Romano - Neurosurgery ([facility])  Vascular Neurology  Comprehensive Stroke Center  Discharge Summary     Summary:     Admit Date: 1/9/2025 10:43 AM    Discharge Date and Time:  01/15/2025 11:29 AM    Attending Physician: Ella Ryder MD     Discharge Provider: Merlin Tavares MD    History of Present Illness: 74M. Hx HTN, HLD, Afib on Eliuquis, CAD s/p CABG, CKD3, insulin dependent diabetes. Admitted to [facility] overnight 1/9 to 1/10 following a syncopal episode w/fall, as well as encephalopathy. Seen by tele-neurology. Recommended MRI (demonstrated bilateral MCA/PCA watershed infarcts), MRA (demonstrated diminished flow in the distal BL MCA), EEG (posterior slowing), and CTA head/neck (unrevealing on 1/10). Patient exhibited an acute change this morning around 10AM, described as left hemiplegia and dysarthria. Stroke code called, and repeat CTA demonstrated R M2 occlusion. Patient transferred to [facility] for neuro-IR capacity.    Hospital Course (synopsis of major diagnoses, care, treatment, and services provided during the course of the hospital stay): 1/12/25: taken for endovascular thrombectomy, RM2 recanalized spontaneously, examination improved today with less dense left-side neglect though is still present, Eliquis and Plavix both restarted, stable for step-down to NPU  1/13/25: NAEON. HDS, afebrile, sating well on room air. Repeat MRI revealing progression of damage due to stroke, although clinically pt is improving.   1/14/25: NAEON. HDS, afebrile, sating well on room air. Stable clinical exam. Stepped down today.   1/15/25: Patient to discharge to rehab today. Will continue with eliquis, plavix, and high intensity statin. Plan for follow-up with vascular neuro in 1 month.      Goals of Care Treatment Preferences:  Code Status: Full Code      Stroke Etiology: Ischemic Cardioembolic Atrial fibrillation    STROKE DOCUMENTATION   Acute Stroke Times   Last Known Normal Date: 01/11/25  Last Known Normal  Time: 0950  Symptom Onset Date: 01/11/25  Symptom Onset Time: 0950  Stroke Team Called Date: 01/11/25  Stroke Team Called Time: 1320  Stroke Team Arrival Date: 01/11/25  Stroke Team Arrival Time: 1320  CT Interpretation Time:  (done at OSH)  Thrombolytic Therapy Recommended: No  Thrombectomy Recommended: Yes  Decision to Treat Time for IR: 1140 (Made at OSH)     NIH Scale:  1a. Level of Consciousness: 0-->Alert, keenly responsive  1b. LOC Questions: 0-->Answers both questions correctly  1c. LOC Commands: 0-->Performs both tasks correctly  2. Best Gaze: 0-->Normal  3. Visual: 0-->No visual loss  4. Facial Palsy: 1-->Minor paralysis (flattened nasolabial fold, asymmetry on smiling)  5a. Motor Arm, Left: 1-->Drift, limb holds 90 (or 45) degrees, but drifts down before full 10 seconds, does not hit bed or other support  5b. Motor Arm, Right: 0-->No drift, limb holds 90 (or 45) degrees for full 10 secs  6a. Motor Leg, Left: 1-->Drift, leg falls by the end of the 5-sec period but does not hit bed  6b. Motor Leg, Right: 0-->No drift, leg holds 30 degree position for full 5 secs  7. Limb Ataxia: 0-->Absent  8. Sensory: 2-->Severe to total sensory loss, patient is not aware of being touched in the face, arm, and leg  9. Best Language: 0-->No aphasia, normal  10. Dysarthria: 0-->Normal  11. Extinction and Inattention (formerly Neglect): 1-->Visual, tactile, auditory, spatial, or personal inattention or extinction to bilateral simultaneous stimulation in one of the sensory modalities  Total (NIH Stroke Scale): 6        Modified Monument Score: 3  Williford Coma Scale:15   ABCD2 Score:    [id number] Score:   HAS -BLED Score:   ICH Score:   Hunt & Zamudio Classification:       Assessment/Plan:     Diagnostic Results      Brain Imaging   MRI Brain: 1/10/25  Bilateral posterior parietal cortical infarcts right greater than left.           MRA Head/Neck: 1/10/25  1. There is diminished flow signal and luminal narrowing in the distal MCA  distribution bilaterally in the posterior parietal region in an area of infarction.  Vascular consultation and follow-up recommended.  CTA may better characterize.  2. There is bilateral flow signal loss in the distal vertebral arteries bilaterally of indeterminate etiology.  This could be artifact.  High-grade narrowing/diminished flow of the distal V4 segment of the left vertebral artery is a possibility.  Mild narrowing or diminished flow of the V4 segment of the right vertebral artery is not excluded also.  Basilar artery demonstrates normal flow signal.  CTA may better characterize.  Vascular consultation and follow-up recommended.  3. Posterior cerebral arteries are patent proximally bilaterally.  P1 and P2 segments are adequately demonstrated.  Probable mild narrowing in the P2 segment on the left.. Possible mild luminal narrowing and diminished flow signal in the more distal components of the PCA distribution bilaterally.     CTA Head and Neck: 1/10  1. Bilateral parietal lobe regions of acute infarction, as seen on earlier MRI.  2. CTA head and neck with no evidence of high-grade stenosis, large vessel occlusion, or dissection.  3. Approximately 50% stenosis at the right ICA origin.  4. Indeterminate small bilateral parotid enhancing lesions.     CTA Head and Neck: 1/11  Evolving parieto-occipital infarcts again identified. Irregular calcified and soft plaque at the carotid bifurcations however less than 50% stenosis by NASCET criteria. At least moderate stenosis at the origin of the vertebral arteries bilaterally. Evaluation of the [id number]-ob-Sdlvpf demonstrates a new right M2 branch occlusion, the occlusion is somewhat distal although the branch is quite large in size.     MRI Brain 1/12/25:   Compared to MRI 01/10/2025, there is infarct extension within the right cerebral hemisphere into the frontal and temporal lobes.  Development of petechial hemorrhage/hemorrhagic conversion in the right parietal lobe.    Similar-sized left parietal infarct with development of petechial hemorrhage.   Bilateral parotid cystic lesions, which could represent cysts or mucoceles.  Other etiologies including cystic neoplasm are not excluded.  Consider follow-up outpatient soft tissue ultrasound if clinically warranted.      Cardiac Imaging   Echo: 1/10/25    Left Ventricle: The left ventricle is normal in size. Mildly increased wall thickness. There is mild concentric hypertrophy. There is normal systolic function with a visually estimated ejection fraction of 55 - 60%. Unable to assess diastolic function due to atrial fibrillation.    Right Ventricle: Systolic function is normal.    Aortic Valve: The aortic valve is a trileaflet valve. There is moderate aortic valve sclerosis. Mildly restricted motion. There is mild stenosis. Aortic valve area by VTI is 1.9 cm². Aortic valve peak velocity is 1.5 m/s. Mean gradient is 5.3 mmHg. The dimensionless index is 0.59.    Tricuspid Valve: There is mild regurgitation.    Pulmonary Artery: The estimated pulmonary artery systolic pressure is 93 mmHg.    Pt in AFib throughout exam.       Interventions: None    Complications: None    Disposition: Rehab Facility    Final Active Diagnoses:    Diagnosis Date Noted POA    PRINCIPAL PROBLEM:  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 [I63.411] 01/11/2025 Yes    Arterial ischemic stroke, multifocal, multiple vascular territories, acute [I63.89] 01/11/2025 Yes    Fall at home [W19.XXXA, Y92.009] 01/10/2025 Not Applicable    Encephalopathy [G93.40] 01/10/2025 Yes    Syncope and collapse [R55] 01/10/2025 Yes    Impaired mobility and ADLs [Z74.09, Z78.9] 11/15/2021 Yes    Longstanding persistent atrial fibrillation [I48.11] 12/30/2020 Yes    Hx of CABG [Z95.1] 03/05/2020 Not Applicable    Posture imbalance [R29.3] 08/22/2017 Yes    Type 2 diabetes mellitus with peripheral neuropathy [E11.42] 01/24/2017 Yes    Gastroesophageal reflux disease [K21.9]  01/24/2017 Yes    Mixed hyperlipidemia [E78.2] 01/24/2017 Yes    Essential hypertension [I10] 01/24/2017 Yes      Problems Resolved During this Admission:    Diagnosis Date Noted Date Resolved POA    Pulmonary artery hypertension [I27.21] 01/11/2025 01/12/2025 Yes     No new Assessment & Plan notes have been filed under this hospital service since the last note was generated.  Service: Vascular Neurology      Recommendations:     Post-discharge complication risks: Falls, Seizure    Stroke Education given to: patient and family    Follow-up in Stroke Clinic in 4-6 weeks.     Discharge Plan:  Antithrombotics: Clopidogrel 75mg  Statin: Atorvastatin 80mg  Anticoagulant: Apixaban 5mg BID  Aggresive risk factor modification:  Diabetes  Diet  Exercise  Atrial Fibrillation    Follow Up:   Follow-up Information       Jono Willoughby MD. Schedule an appointment as soon as possible for a visit .    Specialty: Internal Medicine  Contact information:  [address] [address]  [phone]               Luis M Ellis MD. Schedule an appointment as soon as possible for a visit .    Specialty: Neurosurgery  Contact information:  [address] [facility] [address]  [phone]               [facility] VASCULAR NEUROLOGY Follow up in 1 month(s).    Specialty: Vascular Neurology  Contact information:  [address] [facility] [id number] [phone]                           Patient Instructions:      Ambulatory referral/consult to Vascular Neurology   Standing Status: Future   Referral Priority: Routine Referral Type: Consultation   Referral Reason: Specialty Services Required   Requested Specialty: Vascular Neurology   Number of Visits Requested: 1       Medications:  Reconciled Home Medications:      Medication List        START taking these medications      (MAGIC MOUTHWASH) 1:1:1 BENADRYL 12.5MG/5ML LIQ, ALUMINUM & MAGNESIUM  Swish and spit 5 mLs every 4 (four) hours as needed (ulcer). for  mouth sores     insulin aspart U-100 100 unit/mL (3 mL) Inpn pen  Commonly known as: NovoLOG  Inject 0-10 Units into the skin before meals and at bedtime as needed (Hyperglycemia).     senna-docusate 8.6-50 mg 8.6-50 mg per tablet  Commonly known as: PERICOLACE  Take 2 tablets by mouth 2 (two) times daily.     tamsulosin 0.4 mg Cap  Commonly known as: FLOMAX  Take 1 capsule (0.4 mg total) by mouth once daily.  Start taking on: January 16, 2025            CHANGE how you take these medications      albuterol 90 mcg/actuation inhaler  Commonly known as: PROVENTIL/VENTOLIN HFA  Inhale 2 puffs into the lungs every 4 (four) hours as needed for Shortness of Breath. Rescue  Notes to patient: Continue to take after discharge from rehab     ascorbic acid (vitamin C) 100 MG tablet  Commonly known as: VITAMIN C  Take 100 mg by mouth daily as needed.  Notes to patient: Continue to take after discharge from rehab     b complex vitamins tablet  Take 1 tablet by mouth once daily.  Notes to patient: Continue to take after discharge from rehab     blood-glucose meter kit  Test glucose 2-3x/day. True metrix  Notes to patient: Continue to take after discharge from rehab     coenzyme Q10 100 mg capsule  Take 1 capsule (100 mg total) by mouth once daily.     dapagliflozin propanediol 5 mg Tab tablet  Commonly known as: Farxiga  Take 1 tablet (5 mg total) by mouth once daily.  Notes to patient: Continue to take after discharge from rehab     ergocalciferol 50,000 unit Cap  Commonly known as: ERGOCALCIFEROL  TAKE 1 CAPSULE BY MOUTH WEEKLY  Notes to patient: Continue to take after discharge from rehab     furosemide 20 MG tablet  Commonly known as: LASIX  TAKE 1 TABLET TWICE DAILY  Notes to patient: Continue to take after discharge from rehab     gabapentin 300 MG capsule  Commonly known as: NEURONTIN  Take 2 capsules (600 mg total) by mouth 2 (two) times daily.  Notes to patient: Continue to take after discharge from rehab     glimepiride  "1 MG tablet  Commonly known as: AMARYL  TAKE 1 TABLET BEFORE BREAKFAST  Notes to patient: Continue to take after discharge from rehab     insulin degludec 100 unit/mL (3 mL) insulin pen  Commonly known as: TRESIBA FLEXTOUCH U-100  Inject 20 Units into the skin once daily.  Notes to patient: Continue to take after discharge from rehab     lancets Misc  Check blood glucose 2x/day. True metrix. E11.65  Notes to patient: Continue to take after discharge from rehab     linaCLOtide 145 mcg Cap capsule  Commonly known as: LINZESS  Take 1 capsule (145 mcg total) by mouth before breakfast.  Notes to patient: Continue to take after discharge from rehab     meclizine 25 mg tablet  Commonly known as: ANTIVERT  Take 1 tablet (25 mg total) by mouth 3 (three) times daily as needed for Dizziness (or at least one HS for 1 week when vertigo active).  Notes to patient: Continue to take after discharge from rehab     * metFORMIN 500 MG ER 24hr tablet  Commonly known as: GLUCOPHAGE-XR  Take 1 tablet with breakfast and 2 tablets with supper.  Notes to patient: Continue to take after discharge from rehab     * metFORMIN 500 MG ER 24hr tablet  Commonly known as: GLUCOPHAGE-XR  Take 1 tablet with breakfast and 2 tablets with supper.  Notes to patient: Continue to take after discharge from rehab     methocarbamoL 750 MG Tab  Commonly known as: ROBAXIN  Take 1 tablet (750 mg total) by mouth 3 (three) times daily as needed (muscle spasms).  Notes to patient: Continue to take after discharge from rehab     nitroGLYCERIN 0.4 MG SL tablet  Commonly known as: NITROSTAT  Place 1 tablet (0.4 mg total) under the tongue every 5 (five) minutes as needed for Chest pain.  Notes to patient: Continue to take after discharge from rehab     NOVOFINE 32 32 gauge x 1/4" Ndle  Generic drug: pen needle, diabetic  Use daily  Notes to patient: Continue to take after discharge from rehab     ondansetron 4 MG Tbdl  Commonly known as: ZOFRAN-ODT  Dissolvxe 1 tablet " (4 mg total) by mouth every 6 (six) hours as needed (nausea).  Notes to patient: Continue to take after discharge from rehab     OZEMPIC 2 mg/dose (8 mg/3 mL) Pnij  Generic drug: semaglutide  Inject 2 mg into the skin every 7 days.  Notes to patient: Continue to take after discharge from rehab     triazolam 0.25 MG tablet  TAKE 1 TABLET BY MOUTH EVERY NIGHT AT BEDTIME AS NEEDED  Notes to patient: Continue to take after discharge from rehab           * This list has 2 medication(s) that are the same as other medications prescribed for you. Read the directions carefully, and ask your doctor or other care provider to review them with you.                CONTINUE taking these medications      acetaminophen 650 MG Tbsr  Commonly known as: TYLENOL  Take 650 mg by mouth every 8 (eight) hours.     apixaban 5 mg Tab  Commonly known as: ELIQUIS  Take 1 tablet (5 mg total) by mouth 2 (two) times daily.     atorvastatin 80 MG tablet  Commonly known as: LIPITOR  TAKE 1 TABLET EVERY EVENING     clopidogreL 75 mg tablet  Commonly known as: PLAVIX  Take 75 mg by mouth once daily.     fenofibrate 160 MG Tab  TAKE 1 TABLET EVERY MORNING     fluticasone propionate 50 mcg/actuation nasal spray  Commonly known as: FLONASE  2 sprays (100 mcg total) by Each Nostril route 2 (two) times daily.     hydrALAZINE 50 MG tablet  Commonly known as: APRESOLINE  TAKE 1 TABLET TWICE DAILY     HYDROcodone-acetaminophen  mg per tablet  Commonly known as: NORCO  Take 1 tablet by mouth every 4 (four) hours as needed for Pain (7-10/10 pain).     isosorbide mononitrate 120 MG 24 hr tablet  Commonly known as: IMDUR  Take 1 tablet (120 mg total) by mouth once daily.     omega-3 acid ethyl esters 1 gram capsule  Commonly known as: LOVAZA  Take 2 capsules (2 g total) by mouth 2 (two) times daily.     pantoprazole 40 MG tablet  Commonly known as: PROTONIX  TAKE 1 TABLET EVERY DAY              Merlin Tavares MD  [facility]  Department of  Vascular Neurology   Ghassan Romano - Neurosurgery ([facility])

## 2025-01-15 NOTE — PLAN OF CARE
A&O x4, able to make needs known and using call light. VSS this shift; monitoring BP. Denies CP/SOB, resting comfortably on RA. Afib controlled on Telemetry. Up in chair majority of today-- worked with PT/OT. Ambulates to bathroom with walker ans SBA. Family at bedside attentive to patient and participating in care. Stroke education provided to patient and booklet at bedside; family involved in education and asking questions. AVS given to patient and discharge education provided. PIV removed and all belongings sent home with family. Report given to Anuja CURIEL at [facility]. Left via wheelchair with transport. All questions answered, needs met and safety checks preformed.     Problem: Adult Inpatient Plan of Care  Goal: Readiness for Transition of Care  Outcome: Adequate for Care Transition     Problem: Stroke, Ischemic (Includes Transient Ischemic Attack)  Goal: Optimal Functional Ability  Outcome: Adequate for Care Transition

## 2025-01-15 NOTE — CARE UPDATE
I have reviewed the chart of Driss Jimenezan Mary Akbar who is hospitalized for the following:    Active Hospital Problems    Diagnosis    *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    Aphasia     Therapy to evaluate and treat       Sensory loss    Hemiparesis     Therapy to evaluate and treat       A-fib     Hist of afib on eliquis      Hypercoagulable state due to atrial fibrillation     On eliquis      Hyponatremia     Monitor with daily cmp      MADDIE (acute kidney injury)     POA  Creatinine 1.5  Avoid nephrotoxic agents  Renally dose meds  Monitor with daily cmp          Arterial ischemic stroke, multifocal, multiple vascular territories, acute    Fall at home    Encephalopathy    Syncope and collapse    Reduced mobility    Chronic anticoagulation     On eliquis      Longstanding persistent atrial fibrillation    Hx of CABG     3 vessel WJ 2016      Posture imbalance    Type 2 diabetes mellitus with peripheral neuropathy    Gastroesophageal reflux disease    Mixed hyperlipidemia    Essential hypertension        Renae Acosta NP  Unit Based [facility]

## 2025-01-15 NOTE — PROGRESS NOTES
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 Hx HTN, HLD, Afib on Eliuquis, CAD s/p CABG, CKD3, insulin dependent diabetes. Admitted to [facility] overnight 1/9 to 1/10 following a syncopal episode w/fall, as well as encephalopathy. Seen by tele-neurology. Recommended MRI (demonstrated bilateral MCA/PCA watershed infarcts), MRA (demonstrated diminished flow in the distal BL MCA), EEG (posterior slowing), and CTA head/neck (unrevealing on 1/10). Patient exhibited an acute change this morning around 10AM, described as left hemiplegia and dysarthria. Stroke code called, and repeat CTA demonstrated R M2 occlusion. No TNK due to recent Eliquis (08:13 that AM, 20:40 the evening before). Patient transferred to [facility] [place] for neuro-IR capacity. Taken for angiogram at [facility] [place] [place]. DSA demonstrated recanalization of R M2, no intervention performed. Admitted to [facility] for post-procedure monitoring. 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 Eliquis and Plavix restarted. Examination with improved left-sided neglect, though is still present. 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 Stable for stepdown to NPU.    Repeat MRI brain w/o contrast to assess for stroke burden following right M2 occlusion revealing infarct extension within the right cerebral hemisphere into the frontal and temporal lobes along with development of petechial hemorrhage/hemorrhagic conversion in the right parietal lobe. Clinically, pt is improving. Stepped down to NPU.    Antithrombotics for secondary stroke prevention: Anticoagulants: Apixaban 5 mg BID , Antiplatelets: Plavix 75mg daily    Statins for secondary stroke prevention and hyperlipidemia, if present:   Statins: Atorvastatin- 80 mg daily    Aggressive risk factor modification: HTN, DM, HLD, CAD     Rehab  efforts: The patient has been evaluated by a stroke team provider and the therapy needs have been fully considered based off the presenting complaints and exam findings. The following therapy evaluations are needed: PT evaluate and treat, OT evaluate and treat, SLP evaluate and treat, PM&R evaluate for appropriate placement    VTE prophylaxis: Mechanical prophylaxis: Place SCDs    BP parameters: Infarct: Post unsucessful thrombectomy, SBP <220        Arterial ischemic stroke, multifocal, multiple vascular territories, acute  Bilateral MCA/PCA watershed territory infarcts, likely due to hypotension in setting on syncope. See primary problem.    Longstanding persistent atrial fibrillation  Stroke risk-factor. Echo on 1/10 with Afib. On Eliquis: received at 20:40 on 1/10, and at 08:13 on 1/11, both prior to acute neurologic decline (right MCA syndrome). Eliquis restarted yesterday evening.    -continue apixaban 5mg BID    Hx of CABG  Continue statin, fibrate, Plavix as detailed elsewhere.    Essential hypertension  Stroke risk-factor.    -hydralazine 50mg BID    Mixed hyperlipidemia  Stroke risk-factor. LDL 82 on admit.    -Lipitor 80mg  -fenofibrate    Type 2 diabetes mellitus with peripheral neuropathy  Stroke risk-factor. A1C 6.1% on admission.    -goal 140 to 180 while admitted  -sliding scale insulin  -accuchecks         1/12/25: taken for endovascular thrombectomy, RM2 recanalized spontaneously, examination improved today with less dense left-side neglect though is still present, Eliquis and Plavix both restarted, stable for step-down to NPU  1/13/25: NAEON. HDS, afebrile, sating well on room air. Repeat MRI revealing progression of damage due to stroke, although clinically pt is improving.   1/14/25: NAEON. HDS, afebrile, sating well on room air. Stable clinical exam. Stepped down today.   1/15/25: Patient to discharge to rehab today. Will continue with eliquis, plavix, and high intensity statin. Plan for  follow-up with vascular neuro in 1 month.      STROKE DOCUMENTATION   Acute Stroke Times   Last Known Normal Date: 01/11/25  Last Known Normal Time: 0950  Symptom Onset Date: 01/11/25  Symptom Onset Time: 0950  Stroke Team Called Date: 01/11/25  Stroke Team Called Time: 1320  Stroke Team Arrival Date: 01/11/25  Stroke Team Arrival Time: 1320  CT Interpretation Time:  (done at OSH)  Thrombolytic Therapy Recommended: No  Thrombectomy Recommended: Yes  Decision to Treat Time for IR: 1140 (Made at OSH)    NIH Scale:  1a. Level of Consciousness: 0-->Alert, keenly responsive  1b. LOC Questions: 0-->Answers both questions correctly  1c. LOC Commands: 0-->Performs both tasks correctly  2. Best Gaze: 0-->Normal  3. Visual: 0-->No visual loss  4. Facial Palsy: 1-->Minor paralysis (flattened nasolabial fold, asymmetry on smiling)  5a. Motor Arm, Left: 1-->Drift, limb holds 90 (or 45) degrees, but drifts down before full 10 seconds, does not hit bed or other support  5b. Motor Arm, Right: 0-->No drift, limb holds 90 (or 45) degrees for full 10 secs  6a. Motor Leg, Left: 1-->Drift, leg falls by the end of the 5-sec period but does not hit bed  6b. Motor Leg, Right: 0-->No drift, leg holds 30 degree position for full 5 secs  7. Limb Ataxia: 0-->Absent  8. Sensory: 2-->Severe to total sensory loss, patient is not aware of being touched in the face, arm, and leg  9. Best Language: 0-->No aphasia, normal  10. Dysarthria: 0-->Normal  11. Extinction and Inattention (formerly Neglect): 1-->Visual, tactile, auditory, spatial, or personal inattention or extinction to bilateral simultaneous stimulation in one of the sensory modalities  Total (NIH Stroke Scale): 6       Modified Aneglica Score: 3  Hampton Coma Scale:15   ABCD2 Score:    [id number] Score:   HAS -BLED Score:   ICH Score:   Hunt & Zamudio Classification:      Hemorrhagic change of an Ischemic Stroke: Does this patient have an ischemic stroke with hemorrhagic changes? No      Neurologic Chief Complaint: bilateral MCA/PCA watershed infarcts 1/10 and R MCA infarct 1/12     Subjective:     Interval History: Patient is seen for follow-up neurological assessment and treatment recommendations: See hospital course    HPI, Past Medical, Family, and Social History remains the same as documented in the initial encounter.     Review of Systems   Constitutional:  Negative for fever.   HENT:  Negative for congestion.    Eyes:  Negative for visual disturbance.   Respiratory:  Negative for shortness of breath.    Cardiovascular:  Negative for chest pain.   Gastrointestinal:  Negative for abdominal distention and abdominal pain.   Endocrine: Negative for polyuria.   Genitourinary:  Negative for difficulty urinating.   Neurological:  Negative for dizziness and headaches.     Scheduled Meds:   apixaban  5 mg Oral BID    atorvastatin  80 mg Oral QHS    clopidogreL  75 mg Oral Daily    fenofibrate  48 mg Oral Daily    fluticasone propionate  2 spray Each Nostril BID    hydrALAZINE  50 mg Oral Q12H    isosorbide mononitrate  120 mg Oral Daily    mupirocin   Nasal BID    pantoprazole  40 mg Oral Daily    polyethylene glycol  17 g Oral BID    senna-docusate 8.6-50 mg  2 tablet Oral BID    tamsulosin  0.4 mg Oral Daily     Continuous Infusions:  PRN Meds:  Current Facility-Administered Medications:     (Magic mouthwash) 1:1:1 diphenhydrAMINE(Benadryl) 12.5mg/5ml liq, aluminum & magnesium hydroxide-simethicone (Maalox), LIDOcaine viscous 2%, 5 mL, Swish & Spit, Q4H PRN    acetaminophen, 650 mg, Oral, Q6H PRN    aluminum-magnesium hydroxide-simethicone, 30 mL, Oral, QID PRN    bisacodyL, 10 mg, Rectal, Daily PRN    carboxymethylcellulose sodium, 1 drop, Ophthalmic, QID PRN    cetirizine, 10 mg, Oral, Daily PRN    dextrose 50%, 12.5 g, Intravenous, PRN    dextrose 50%, 25 g, Intravenous, PRN    glucagon (human recombinant), 1 mg, Intramuscular, PRN    glucose, 16 g, Oral, PRN    glucose, 24 g, Oral, PRN     hydrALAZINE, 10 mg, Intravenous, Q4H PRN    hydrocortisone, , Topical (Top), BID PRN    hydrOXYzine HCL, 25 mg, Oral, TID PRN    insulin aspart U-100, 0-10 Units, Subcutaneous, QID (AC + HS) PRN    labetalol, 10 mg, Intravenous, Q4H PRN    ondansetron, 4 mg, Intravenous, Q4H PRN    oxyCODONE, 5 mg, Oral, Q4H PRN    sodium chloride 0.9%, 10 mL, Intravenous, PRN    Objective:     Vital Signs (Most Recent):  Temp: 97.4 °F (36.3 °C) (01/15/25 1132)  Pulse: 69 (01/15/25 1132)  Resp: 16 (01/15/25 0747)  BP: (!) 83/45 (01/15/25 1132)  SpO2: 96 % (01/15/25 1132)  BP Location: Left arm    Vital Signs Range (Last 24H):  Temp:  [97.4 °F (36.3 °C)-98.3 °F (36.8 °C)]   Pulse:  [58-74]   Resp:  [16-18]   BP: ()/(45-79)   SpO2:  [94 %-98 %]   BP Location: Left arm       Physical Exam  Constitutional:       General: He is not in acute distress.  HENT:      Head: Normocephalic and atraumatic.   Eyes:      Extraocular Movements: Extraocular movements intact.      Conjunctiva/sclera: Conjunctivae normal.      Pupils: Pupils are equal, round, and reactive to light.   Cardiovascular:      Rate and Rhythm: Normal rate and regular rhythm.      Pulses: Normal pulses.      Heart sounds: Normal heart sounds.   Pulmonary:      Breath sounds: Normal breath sounds.   Abdominal:      General: Abdomen is flat. Bowel sounds are normal. There is no distension.      Tenderness: There is no abdominal tenderness.   Musculoskeletal:         General: No swelling or tenderness.   Skin:     General: Skin is warm and dry.      Capillary Refill: Capillary refill takes less than 2 seconds.   Neurological:      Mental Status: He is alert.      Motor: Weakness present.              Neurological Exam:   LOC: alert  Attention Span: Good   Language: No aphasia  Articulation: No dysarthria  Orientation: Person, Place, Time   Visual Fields: Full  EOM (CN III, IV, VI): Full/intact  Pupils (CN II, III): PERRL  Facial Sensation (CN V): Facial sensory  "loss  Facial Movement (CN VII): Lower facial weakness on the Left  Motor: Arm left  Normal 5/5  Leg left  Normal 5/5  Arm right  Normal 5/5  Leg right Normal 5/5  Sensation: Intact to light touch, temperature and vibration and severe sensory loss of left arm and leg    Laboratory:  CMP: No results for input(s): "GLUCOSE", "CALCIUM", "ALBUMIN", "PROT", "NA", "K", "CO2", "CL", "BUN", "CREATININE", "ALKPHOS", "ALT", "AST", "BILITOT" in the last 24 hours.  CBC:   Recent Labs   Lab 01/14/25  0104   WBC 11.63   RBC 4.89   HGB 13.4*   HCT 39.9*      MCV 82   MCH 27.4   MCHC 33.6       Diagnostic Results      Brain Imaging   MRI Brain: 1/10/25  Bilateral posterior parietal cortical infarcts right greater than left.           MRA Head/Neck: 1/10/25  1. There is diminished flow signal and luminal narrowing in the distal MCA distribution bilaterally in the posterior parietal region in an area of infarction.  Vascular consultation and follow-up recommended.  CTA may better characterize.  2. There is bilateral flow signal loss in the distal vertebral arteries bilaterally of indeterminate etiology.  This could be artifact.  High-grade narrowing/diminished flow of the distal V4 segment of the left vertebral artery is a possibility.  Mild narrowing or diminished flow of the V4 segment of the right vertebral artery is not excluded also.  Basilar artery demonstrates normal flow signal.  CTA may better characterize.  Vascular consultation and follow-up recommended.  3. Posterior cerebral arteries are patent proximally bilaterally.  P1 and P2 segments are adequately demonstrated.  Probable mild narrowing in the P2 segment on the left.. Possible mild luminal narrowing and diminished flow signal in the more distal components of the PCA distribution bilaterally.     CTA Head and Neck: 1/10  1. Bilateral parietal lobe regions of acute infarction, as seen on earlier MRI.  2. CTA head and neck with no evidence of high-grade stenosis, " large vessel occlusion, or dissection.  3. Approximately 50% stenosis at the right ICA origin.  4. Indeterminate small bilateral parotid enhancing lesions.     CTA Head and Neck: 1/11  Evolving parieto-occipital infarcts again identified. Irregular calcified and soft plaque at the carotid bifurcations however less than 50% stenosis by NASCET criteria. At least moderate stenosis at the origin of the vertebral arteries bilaterally. Evaluation of the [id number] demonstrates a new right M2 branch occlusion, the occlusion is somewhat distal although the branch is quite large in size.     MRI Brain 1/12/25:   Compared to MRI 01/10/2025, there is infarct extension within the right cerebral hemisphere into the frontal and temporal lobes.  Development of petechial hemorrhage/hemorrhagic conversion in the right parietal lobe.   Similar-sized left parietal infarct with development of petechial hemorrhage.   Bilateral parotid cystic lesions, which could represent cysts or mucoceles.  Other etiologies including cystic neoplasm are not excluded.  Consider follow-up outpatient soft tissue ultrasound if clinically warranted.      Cardiac Imaging   Echo: 1/10/25    Left Ventricle: The left ventricle is normal in size. Mildly increased wall thickness. There is mild concentric hypertrophy. There is normal systolic function with a visually estimated ejection fraction of 55 - 60%. Unable to assess diastolic function due to atrial fibrillation.    Right Ventricle: Systolic function is normal.    Aortic Valve: The aortic valve is a trileaflet valve. There is moderate aortic valve sclerosis. Mildly restricted motion. There is mild stenosis. Aortic valve area by VTI is 1.9 cm². Aortic valve peak velocity is 1.5 m/s. Mean gradient is 5.3 mmHg. The dimensionless index is 0.59.    Tricuspid Valve: There is mild regurgitation.    Pulmonary Artery: The estimated pulmonary artery systolic pressure is 93 mmHg.    Pt in AFib throughout  exam.    Merlin Tavares MD  Comprehensive Stroke Center  Department of Vascular Neurology   [facility] [place])

## 2025-01-15 NOTE — SUBJECTIVE & OBJECTIVE
Neurologic Chief Complaint: bilateral MCA/PCA watershed infarcts 1/10 and R MCA infarct 1/12     Subjective:     Interval History: Patient is seen for follow-up neurological assessment and treatment recommendations: See hospital course    HPI, Past Medical, Family, and Social History remains the same as documented in the initial encounter.     Review of Systems   Constitutional:  Negative for fever.   HENT:  Negative for congestion.    Eyes:  Negative for visual disturbance.   Respiratory:  Negative for shortness of breath.    Cardiovascular:  Negative for chest pain.   Gastrointestinal:  Negative for abdominal distention and abdominal pain.   Endocrine: Negative for polyuria.   Genitourinary:  Negative for difficulty urinating.   Neurological:  Negative for dizziness and headaches.     Scheduled Meds:   apixaban  5 mg Oral BID    atorvastatin  80 mg Oral QHS    clopidogreL  75 mg Oral Daily    fenofibrate  48 mg Oral Daily    fluticasone propionate  2 spray Each Nostril BID    hydrALAZINE  50 mg Oral Q12H    isosorbide mononitrate  120 mg Oral Daily    mupirocin   Nasal BID    pantoprazole  40 mg Oral Daily    polyethylene glycol  17 g Oral BID    senna-docusate 8.6-50 mg  2 tablet Oral BID    tamsulosin  0.4 mg Oral Daily     Continuous Infusions:  PRN Meds:  Current Facility-Administered Medications:     (Magic mouthwash) 1:1:1 diphenhydrAMINE(Benadryl) 12.5mg/5ml liq, aluminum & magnesium hydroxide-simethicone (Maalox), LIDOcaine viscous 2%, 5 mL, Swish & Spit, Q4H PRN    acetaminophen, 650 mg, Oral, Q6H PRN    aluminum-magnesium hydroxide-simethicone, 30 mL, Oral, QID PRN    bisacodyL, 10 mg, Rectal, Daily PRN    carboxymethylcellulose sodium, 1 drop, Ophthalmic, QID PRN    cetirizine, 10 mg, Oral, Daily PRN    dextrose 50%, 12.5 g, Intravenous, PRN    dextrose 50%, 25 g, Intravenous, PRN    glucagon (human recombinant), 1 mg, Intramuscular, PRN    glucose, 16 g, Oral, PRN    glucose, 24 g, Oral, PRN     hydrALAZINE, 10 mg, Intravenous, Q4H PRN    hydrocortisone, , Topical (Top), BID PRN    hydrOXYzine HCL, 25 mg, Oral, TID PRN    insulin aspart U-100, 0-10 Units, Subcutaneous, QID (AC + HS) PRN    labetalol, 10 mg, Intravenous, Q4H PRN    ondansetron, 4 mg, Intravenous, Q4H PRN    oxyCODONE, 5 mg, Oral, Q4H PRN    sodium chloride 0.9%, 10 mL, Intravenous, PRN    Objective:     Vital Signs (Most Recent):  Temp: 97.4 °F (36.3 °C) (01/15/25 1132)  Pulse: 69 (01/15/25 1132)  Resp: 16 (01/15/25 0747)  BP: (!) 83/45 (01/15/25 1132)  SpO2: 96 % (01/15/25 1132)  BP Location: Left arm    Vital Signs Range (Last 24H):  Temp:  [97.4 °F (36.3 °C)-98.3 °F (36.8 °C)]   Pulse:  [58-74]   Resp:  [16-18]   BP: ()/(45-79)   SpO2:  [94 %-98 %]   BP Location: Left arm       Physical Exam  Constitutional:       General: He is not in acute distress.  HENT:      Head: Normocephalic and atraumatic.   Eyes:      Extraocular Movements: Extraocular movements intact.      Conjunctiva/sclera: Conjunctivae normal.      Pupils: Pupils are equal, round, and reactive to light.   Cardiovascular:      Rate and Rhythm: Normal rate and regular rhythm.      Pulses: Normal pulses.      Heart sounds: Normal heart sounds.   Pulmonary:      Breath sounds: Normal breath sounds.   Abdominal:      General: Abdomen is flat. Bowel sounds are normal. There is no distension.      Tenderness: There is no abdominal tenderness.   Musculoskeletal:         General: No swelling or tenderness.   Skin:     General: Skin is warm and dry.      Capillary Refill: Capillary refill takes less than 2 seconds.   Neurological:      Mental Status: He is alert.      Motor: Weakness present.              Neurological Exam:   LOC: alert  Attention Span: Good   Language: No aphasia  Articulation: No dysarthria  Orientation: Person, Place, Time   Visual Fields: Full  EOM (CN III, IV, VI): Full/intact  Pupils (CN II, III): PERRL  Facial Sensation (CN V): Facial sensory  "loss  Facial Movement (CN VII): Lower facial weakness on the Left  Motor: Arm left  Normal 5/5  Leg left  Normal 5/5  Arm right  Normal 5/5  Leg right Normal 5/5  Sensation: Intact to light touch, temperature and vibration and severe sensory loss of left arm and leg    Laboratory:  CMP: No results for input(s): "GLUCOSE", "CALCIUM", "ALBUMIN", "PROT", "NA", "K", "CO2", "CL", "BUN", "CREATININE", "ALKPHOS", "ALT", "AST", "BILITOT" in the last 24 hours.  CBC:   Recent Labs   Lab 01/14/25  0104   WBC 11.63   RBC 4.89   HGB 13.4*   HCT 39.9*      MCV 82   MCH 27.4   MCHC 33.6       Diagnostic Results      Brain Imaging   MRI Brain: 1/10/25  Bilateral posterior parietal cortical infarcts right greater than left.           MRA Head/Neck: 1/10/25  1. There is diminished flow signal and luminal narrowing in the distal MCA distribution bilaterally in the posterior parietal region in an area of infarction.  Vascular consultation and follow-up recommended.  CTA may better characterize.  2. There is bilateral flow signal loss in the distal vertebral arteries bilaterally of indeterminate etiology.  This could be artifact.  High-grade narrowing/diminished flow of the distal V4 segment of the left vertebral artery is a possibility.  Mild narrowing or diminished flow of the V4 segment of the right vertebral artery is not excluded also.  Basilar artery demonstrates normal flow signal.  CTA may better characterize.  Vascular consultation and follow-up recommended.  3. Posterior cerebral arteries are patent proximally bilaterally.  P1 and P2 segments are adequately demonstrated.  Probable mild narrowing in the P2 segment on the left.. Possible mild luminal narrowing and diminished flow signal in the more distal components of the PCA distribution bilaterally.     CTA Head and Neck: 1/10  1. Bilateral parietal lobe regions of acute infarction, as seen on earlier MRI.  2. CTA head and neck with no evidence of high-grade stenosis, " large vessel occlusion, or dissection.  3. Approximately 50% stenosis at the right ICA origin.  4. Indeterminate small bilateral parotid enhancing lesions.     CTA Head and Neck: 1/11  Evolving parieto-occipital infarcts again identified. Irregular calcified and soft plaque at the carotid bifurcations however less than 50% stenosis by NASCET criteria. At least moderate stenosis at the origin of the vertebral arteries bilaterally. Evaluation of the [id number] demonstrates a new right M2 branch occlusion, the occlusion is somewhat distal although the branch is quite large in size.     MRI Brain 1/12/25:   Compared to MRI 01/10/2025, there is infarct extension within the right cerebral hemisphere into the frontal and temporal lobes.  Development of petechial hemorrhage/hemorrhagic conversion in the right parietal lobe.   Similar-sized left parietal infarct with development of petechial hemorrhage.   Bilateral parotid cystic lesions, which could represent cysts or mucoceles.  Other etiologies including cystic neoplasm are not excluded.  Consider follow-up outpatient soft tissue ultrasound if clinically warranted.      Cardiac Imaging   Echo: 1/10/25    Left Ventricle: The left ventricle is normal in size. Mildly increased wall thickness. There is mild concentric hypertrophy. There is normal systolic function with a visually estimated ejection fraction of 55 - 60%. Unable to assess diastolic function due to atrial fibrillation.    Right Ventricle: Systolic function is normal.    Aortic Valve: The aortic valve is a trileaflet valve. There is moderate aortic valve sclerosis. Mildly restricted motion. There is mild stenosis. Aortic valve area by VTI is 1.9 cm². Aortic valve peak velocity is 1.5 m/s. Mean gradient is 5.3 mmHg. The dimensionless index is 0.59.    Tricuspid Valve: There is mild regurgitation.    Pulmonary Artery: The estimated pulmonary artery systolic pressure is 93 mmHg.    Pt in AFib throughout  exam.

## 2025-01-15 NOTE — PT/OT/SLP PROGRESS
Occupational Therapy   Treatment    Name: Driss Hernandez Jr.  MRN: [id number]  Admitting Diagnosis:  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       Recommendations:     Discharge Recommendations: High Intensity Therapy  Discharge Equipment Recommendations:  walker, rolling, bedside commode  Barriers to discharge:  None    Assessment:     Driss Hernandez Jr. is a 74 y.o. male with a medical diagnosis of 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.  He presents with continued increased assistance needed for self-care and functional mobility 2/2 AMS. Performance deficits affecting function are weakness, decreased upper extremity function, gait instability, decreased ROM, impaired endurance, impaired balance, decreased lower extremity function, impaired coordination, visual deficits, decreased safety awareness, impaired fine motor, impaired self care skills, impaired cognition, impaired functional mobility, decreased coordination. This date pt continuing to display decreased LUE and L visual attention with poor obstacle navigation and safety awareness with mobility. Pt benefiting from increased assistance for RW management and obstacle navigation due to L attention deficits and decreased problem solving. Pt also continues to display poor activity sequencing and problem solving, benefiting from cues t/o self-care activities. Pt completing neuromuscular re-education activity with improved timing and visual scanning with repetitions, requiring decreasing cues. Pt remains motivated to increase independence. Patient has demonstrated sufficient progression to warrant high intensity therapy evidenced by objectives noted below.      Rehab Prognosis:  Good; patient would benefit from acute skilled OT services to address these deficits and reach maximum level of function.       Plan:     Patient to be seen 4 x/week to address the above listed problems via self-care/home management, therapeutic  "activities, therapeutic exercises, neuromuscular re-education  Plan of Care Expires: 02/12/25  Plan of Care Reviewed with: patient, spouse, daughter    Subjective     Chief Complaint: "what next?" "Am I done?" Pt benefiting from cues for activity sequencing  Patient/Family Comments/goals: increase independence  Pain/Comfort:  Pain Rating 1: 0/10    Objective:     Communicated with: nursing prior to session.  Patient found up in chair with telemetry, Condom Catheter upon OT entry to room.    General Precautions: Standard, aspiration, vision impaired, fall    Orthopedic Precautions:spinal precautions  Braces: N/A  Respiratory Status: Room air     Occupational Performance:     Bed Mobility:    Not completed 2/2 pt found [facility]     Functional Mobility/Transfers:  Patient completed Sit <> Stand Transfer with contact guard assistance  with  rolling walker   Functional Mobility: Pt engaged in functional mobility to simulate household/community distances with moderate assistance & RW in order to maximize functional endurance and standing balance required for engagement in occupations of choice - pt benefiting from moderate assistance for RW management and obstacle navigation; upon 2nd trial pt completing functional mobility in room with minimal assitance and R HHA benefiting from moderate verbal cues for activity sequence to return to chair      Activities of Daily Living:  Grooming: moderate assistance for oral hygiene and face washing while standing at sink with RW and CGA - pt benefiting from moderate tactile and verbal cues for activity sequence and thoroughness    Neuromuscular re-education  - Pt completing LUE D1 PNF diagonals and visual scanning activity to  manipulative on R side of waist height table and hand to therapist in L upper quadrant while static standing with CGA x10 reps, progressing from moderate to minimal verbal cues for activity sequence; pt then reversing activity, requiring no verbal cues for " task sequence x10 reps; pt with mild difficulty with FMC to grasp 1 manipulative at a time    AMPAC 6 Click ADL: 17    Treatment & Education:  Session this date targeted self-care and neuromuscular activities to increase pt's independence  Pt educated on OT roles, POC, call button for assistance  Pt educated on importance of OOB activity  Pt educated on visual scanning techniques to increase functional mobility safety    Patient left up in chair with all lines intact, call button in reach, nursing notified, and family present    GOALS:   Multidisciplinary Problems       Occupational Therapy Goals          Problem: Occupational Therapy    Goal Priority Disciplines Outcome Interventions   Occupational Therapy Goal     OT, PT/OT Progressing    Description: Goals to be met by: 2/12/2025     Patient will increase functional independence with ADLs by performing:    UE Dressing with Supervision.  LE Dressing with Supervsion.  Grooming while standing at sink with Supervision.  Toileting from toilet with Supervision for hygiene and clothing management.   Step transfer with Supervision  Toilet transfer to toilet with Supervision.  100% reciprocation and integration of 3/3 spinal precautions, DME recommendations, and ADL/task modifications post-sx to support safe d/c at highest level of function.      DME Justifications (see above for complete DME recommendations)    Rolling Walker- Patient demonstrates a mobility limitation that significantly impairs their ability to participate in one or more mobility related activities of daily living. Patient's mobility limitation cannot be sufficiently resolved with the use of a cane, but can be sufficiently resolved with the use of a rolling walker.The use of a rolling walker will considerably improve their ability to participate in MRADLs. Patient will use the walker on a regular basis at home.      Tub transfer bench - Patient demonstrates a mobility limitation that impairs their  ability to participate in bathing within shower tub combination safely. Patient's limitation cannot be sufficiently resolved without the use of a tub transfer bench. The use of the tub transfer bench will considerably improve their ability to safely participate in bathing / shower transfers. Patient will use the tub transfer bench on a regular basis at home.                         Time Tracking:     OT Date of Treatment: 01/15/25  OT Start Time: 0919  OT Stop Time: 0943  OT Total Time (min): 24 min    Billable Minutes:Self Care/Home Management 12  Neuromuscular Re-education 12    OT/DOROTHY: OT          1/15/2025

## 2025-01-15 NOTE — PLAN OF CARE
[facility]    FACILITY TRANSFER ORDERS      Patient Name: Driss Hernandez Jr.  YOB: 1950    PCP: Jono Willoughby MD   PCP Address: [address] [place]  PCP Phone Number: [phone]  PCP Fax: [phone]    Encounter Date: 01/15/2025    Admit to: Rehab    Vital Signs:  Routine    Diagnoses:   Active Hospital Problems    Diagnosis  POA    *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 [I63.411]  Yes    Arterial ischemic stroke, multifocal, multiple vascular territories, acute [I63.89]  Yes    Fall at home [W19.XXXA, Y92.009]  Not Applicable    Encephalopathy [G93.40]  Yes    Syncope and collapse [R55]  Yes    Impaired mobility and ADLs [Z74.09, Z78.9]  Yes    Longstanding persistent atrial fibrillation [I48.11]  Yes    Hx of CABG [Z95.1]  Not Applicable     3 vessel WJ 2016      Posture imbalance [R29.3]  Yes    Type 2 diabetes mellitus with peripheral neuropathy [E11.42]  Yes    Gastroesophageal reflux disease [K21.9]  Yes    Mixed hyperlipidemia [E78.2]  Yes    Essential hypertension [I10]  Yes      Resolved Hospital Problems    Diagnosis Date Resolved POA    Pulmonary artery hypertension [I27.21] 01/12/2025 Yes       Allergies:  Review of patient's allergies indicates:   Allergen Reactions    Penicillins Other (See Comments)     Unknown reaction, Had a reaction as a child    Shrimp Itching     Hand Itching       Diet: diabetic diet: 2000 calorie  IDDSI Level 7, Liquid Diet Level: Thin liquids     Activities: Activity as tolerated    Goals of Care Treatment Preferences:  Code Status: Full Code      Nursing: Per facility     Labs: CBC and CMP Once     CONSULTS:    Physical Therapy to evaluate and treat. , Occupational Therapy to evaluate and treat., and Speech Therapy to evaluate and treat for Language, Swallowing, and Cognition.    MISCELLANEOUS CARE:  Diabetes Care:   SN to perform and educate Diabetic management with blood glucose monitoring:,  Fingerstick blood sugar a.m. and p.m., and Report CBG < 60 or > 350 to physician.    WOUND CARE ORDERS  None    Medications: Review discharge medications with patient and family and provide education.         Medication List        START taking these medications      (MAGIC MOUTHWASH) 1:1:1 BENADRYL 12.5MG/5ML LIQ, ALUMINUM & MAGNESIUM  Swish and spit 5 mLs every 4 (four) hours as needed (ulcer). for mouth sores     insulin aspart U-100 100 unit/mL (3 mL) Inpn pen  Commonly known as: NovoLOG  Inject 0-10 Units into the skin before meals and at bedtime as needed (Hyperglycemia).     senna-docusate 8.6-50 mg 8.6-50 mg per tablet  Commonly known as: PERICOLACE  Take 2 tablets by mouth 2 (two) times daily.     tamsulosin 0.4 mg Cap  Commonly known as: FLOMAX  Take 1 capsule (0.4 mg total) by mouth once daily.  Start taking on: January 16, 2025            CHANGE how you take these medications      albuterol 90 mcg/actuation inhaler  Commonly known as: PROVENTIL/VENTOLIN HFA  Inhale 2 puffs into the lungs every 4 (four) hours as needed for Shortness of Breath. Rescue  Notes to patient: Continue to take after discharge from rehab     ascorbic acid (vitamin C) 100 MG tablet  Commonly known as: VITAMIN C  Take 100 mg by mouth daily as needed.  Notes to patient: Continue to take after discharge from rehab     b complex vitamins tablet  Take 1 tablet by mouth once daily.  Notes to patient: Continue to take after discharge from rehab     blood-glucose meter kit  Test glucose 2-3x/day. True metrix  Notes to patient: Continue to take after discharge from rehab     coenzyme Q10 100 mg capsule  Take 1 capsule (100 mg total) by mouth once daily.     dapagliflozin propanediol 5 mg Tab tablet  Commonly known as: Farxiga  Take 1 tablet (5 mg total) by mouth once daily.  Notes to patient: Continue to take after discharge from rehab     ergocalciferol 50,000 unit Cap  Commonly known as: ERGOCALCIFEROL  TAKE 1 CAPSULE BY MOUTH  WEEKLY  Notes to patient: Continue to take after discharge from rehab     furosemide 20 MG tablet  Commonly known as: LASIX  TAKE 1 TABLET TWICE DAILY  Notes to patient: Continue to take after discharge from rehab     gabapentin 300 MG capsule  Commonly known as: NEURONTIN  Take 2 capsules (600 mg total) by mouth 2 (two) times daily.  Notes to patient: Continue to take after discharge from rehab     glimepiride 1 MG tablet  Commonly known as: AMARYL  TAKE 1 TABLET BEFORE BREAKFAST  Notes to patient: Continue to take after discharge from rehab     hydrALAZINE 50 MG tablet  Commonly known as: APRESOLINE  TAKE 1 TABLET TWICE DAILY  Notes to patient: Hold for SBP <130     insulin degludec 100 unit/mL (3 mL) insulin pen  Commonly known as: TRESIBA FLEXTOUCH U-100  Inject 20 Units into the skin once daily.  Notes to patient: Continue to take after discharge from rehab     lancets Misc  Check blood glucose 2x/day. True metrix. E11.65  Notes to patient: Continue to take after discharge from rehab     linaCLOtide 145 mcg Cap capsule  Commonly known as: LINZESS  Take 1 capsule (145 mcg total) by mouth before breakfast.  Notes to patient: Continue to take after discharge from rehab     meclizine 25 mg tablet  Commonly known as: ANTIVERT  Take 1 tablet (25 mg total) by mouth 3 (three) times daily as needed for Dizziness (or at least one HS for 1 week when vertigo active).  Notes to patient: Continue to take after discharge from rehab     * metFORMIN 500 MG ER 24hr tablet  Commonly known as: GLUCOPHAGE-XR  Take 1 tablet with breakfast and 2 tablets with supper.  Notes to patient: Continue to take after discharge from rehab     * metFORMIN 500 MG ER 24hr tablet  Commonly known as: GLUCOPHAGE-XR  Take 1 tablet with breakfast and 2 tablets with supper.  Notes to patient: Continue to take after discharge from rehab     methocarbamoL 750 MG Tab  Commonly known as: ROBAXIN  Take 1 tablet (750 mg total) by mouth 3 (three) times daily  "as needed (muscle spasms).  Notes to patient: Continue to take after discharge from rehab     nitroGLYCERIN 0.4 MG SL tablet  Commonly known as: NITROSTAT  Place 1 tablet (0.4 mg total) under the tongue every 5 (five) minutes as needed for Chest pain.  Notes to patient: Continue to take after discharge from rehab     NOVOFINE 32 32 gauge x 1/4" Ndle  Generic drug: pen needle, diabetic  Use daily  Notes to patient: Continue to take after discharge from rehab     ondansetron 4 MG Tbdl  Commonly known as: ZOFRAN-ODT  Dissolvxe 1 tablet (4 mg total) by mouth every 6 (six) hours as needed (nausea).  Notes to patient: Continue to take after discharge from rehab     OZEMPIC 2 mg/dose (8 mg/3 mL) Pnij  Generic drug: semaglutide  Inject 2 mg into the skin every 7 days.  Notes to patient: Continue to take after discharge from rehab     triazolam 0.25 MG tablet  TAKE 1 TABLET BY MOUTH EVERY NIGHT AT BEDTIME AS NEEDED  Notes to patient: Continue to take after discharge from rehab           * This list has 2 medication(s) that are the same as other medications prescribed for you. Read the directions carefully, and ask your doctor or other care provider to review them with you.                CONTINUE taking these medications      acetaminophen 650 MG Tbsr  Commonly known as: TYLENOL  Take 650 mg by mouth every 8 (eight) hours.     apixaban 5 mg Tab  Commonly known as: ELIQUIS  Take 1 tablet (5 mg total) by mouth 2 (two) times daily.     atorvastatin 80 MG tablet  Commonly known as: LIPITOR  TAKE 1 TABLET EVERY EVENING     clopidogreL 75 mg tablet  Commonly known as: PLAVIX  Take 75 mg by mouth once daily.     fenofibrate 160 MG Tab  TAKE 1 TABLET EVERY MORNING     fluticasone propionate 50 mcg/actuation nasal spray  Commonly known as: FLONASE  2 sprays (100 mcg total) by Each Nostril route 2 (two) times daily.     HYDROcodone-acetaminophen  mg per tablet  Commonly known as: NORCO  Take 1 tablet by mouth every 4 (four) " hours as needed for Pain (7-10/10 pain).     isosorbide mononitrate 120 MG 24 hr tablet  Commonly known as: IMDUR  Take 1 tablet (120 mg total) by mouth once daily.     omega-3 acid ethyl esters 1 gram capsule  Commonly known as: LOVAZA  Take 2 capsules (2 g total) by mouth 2 (two) times daily.     pantoprazole 40 MG tablet  Commonly known as: PROTONIX  TAKE 1 TABLET EVERY DAY                Immunizations Administered as of 1/15/2025       Name Date Dose VIS Date Route Exp Date    COVID-19, MRNA, LN-S, PF (Pfizer) (Purple Cap) 12/7/2021  9:27 AM 0.3 mL 12/12/2020 Intramuscular 2/28/2022    Site: Left deltoid     Given By: Bernadette Cavazos RN     : [employer]     Lot: [id number]     COVID-19, MRNA, LN-S, PF (Pfizer) (Purple Cap) 2/15/2021 0.3 mL -- Intramuscular --    Site: Left deltoid     : [employer]     Lot: [id number]     Comment: Adminis     COVID-19, MRNA, LN-S, PF (Pfizer) (Purple Cap) 1/25/2021 0.3 mL -- Intramuscular --    Site: Left deltoid     : [employer]     Lot: [id number]     Comment: Adminis             This patient has had both covid vaccinations    Some patients may experience side effects after vaccination.  These may include fever, headache, muscle or joint aches.  Most symptoms resolve with 24-48 hours and do not require urgent medical evaluation unless they persist for more than 72 hours or symptoms are concerning for an unrelated medical condition.          _________________________________  Merlin Tavares MD  01/15/2025

## 2025-01-15 NOTE — PLAN OF CARE
[facility])  Discharge Final Note    Primary Care Provider: Jono Willoughby MD    Expected Discharge Date: 1/15/2025    Final Discharge Note (most recent)       Final Note - 01/15/25 1212          Final Note    Assessment Type Final Discharge Note (P)      Anticipated Discharge Disposition Rehab Facility (P)         Post-Acute Status    Post-Acute Authorization Placement (P)      Post-Acute Placement Status Set-up Complete/Auth obtained (P)                  [facility]  RN given number for report and SW set up 2pm WC transport.  SW notified family at bedside.      Maricarmen Palomino LMSW [facility]  [phone]    Future Appointments   Date Time Provider Department Center   1/22/2025 10:40 AM Bing Antonio PA-C [facility]         Important Message from Medicare  Important Message from Medicare regarding Discharge Appeal Rights: Other (comments) (pt. going to Rehab)     Date IMM was signed: 01/15/25  Time IMM was signed: 1046    Contact Info       Jono Willoughby MD   Specialty: Internal Medicine   Relationship: PCP - General    [address] [place] [address] [place] [address]   Phone: [phone]       Next Steps: Schedule an appointment as soon as possible for a visit    Luis M Ellis MD   Specialty: Neurosurgery    [address]  [place] [address]   Phone: [phone]       Next Steps: Schedule an appointment as soon as possible for a visit    [facility] VASCULAR NEUROLOGY   Specialty: Vascular Neurology    [address]  [place] [address]   Phone: [phone]       Next Steps: Follow up in 1 month(s)

## 2025-01-15 NOTE — ASSESSMENT & PLAN NOTE
74M. Hx HTN, HLD, Afib on Eliuquis, CAD s/p CABG, CKD3, insulin dependent diabetes. Admitted to [facility] overnight 1/9 to 1/10 following a syncopal episode w/fall, as well as encephalopathy. Seen by tele-neurology. Recommended MRI (demonstrated bilateral MCA/PCA watershed infarcts), MRA (demonstrated diminished flow in the distal BL MCA), EEG (posterior slowing), and CTA head/neck (unrevealing on 1/10). Patient exhibited an acute change this morning around 10AM, described as left hemiplegia and dysarthria. Stroke code called, and repeat CTA demonstrated R M2 occlusion. No TNK due to recent Eliquis (08:13 that AM, 20:40 the evening before). Patient transferred to [facility] for neuro-IR capacity. Taken for angiogram at [facility]. DSA demonstrated recanalization of R M2, no intervention performed. Admitted to [facility] for post-procedure monitoring. 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 Eliquis and Plavix restarted. Examination with improved left-sided neglect, though is still present. 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 Stable for stepdown to NPU.    Repeat MRI brain w/o contrast to assess for stroke burden following right M2 occlusion revealing infarct extension within the right cerebral hemisphere into the frontal and temporal lobes along with development of petechial hemorrhage/hemorrhagic conversion in the right parietal lobe. Clinically, pt is improving. Stepped down to NPU.    Antithrombotics for secondary stroke prevention: Anticoagulants: Apixaban 5 mg BID , Antiplatelets: Plavix 75mg daily    Statins for secondary stroke prevention and hyperlipidemia, if present:   Statins: Atorvastatin- 80 mg daily    Aggressive risk factor modification: HTN, DM, HLD, CAD     Rehab efforts: The patient has been evaluated by a stroke team provider and the therapy needs have been fully considered based off the presenting complaints and exam findings. The following  therapy evaluations are needed: PT evaluate and treat, OT evaluate and treat, SLP evaluate and treat, PM&R evaluate for appropriate placement    VTE prophylaxis: Mechanical prophylaxis: Place SCDs    BP parameters: Infarct: Post unsucessful thrombectomy, SBP <220

## 2025-01-16 ENCOUNTER — TELEPHONE (OUTPATIENT)
Dept: OTOLARYNGOLOGY | Facility: CLINIC | Age: 75
End: 2025-01-16
Payer: MEDICARE

## 2025-01-16 DIAGNOSIS — K11.9 LESION OF PAROTID GLAND: Primary | ICD-10-CM

## 2025-01-16 NOTE — TELEPHONE ENCOUNTER
Got a message from IR dept when the tried to schedule pt for FNA, wife stated pt in rehab and [facility] and didn't think pt needed fna anymore.  I called pt wife, Misa . She stated pt had stroke last week and is in rehab at [facility].  States pt is doing well and improving.  I informed her to let us know once pt is well enough to do procedure. She verb understanding

## 2025-01-27 ENCOUNTER — TELEPHONE (OUTPATIENT)
Dept: NEUROSURGERY | Facility: CLINIC | Age: 75
End: 2025-01-27
Payer: MEDICARE

## 2025-01-27 ENCOUNTER — TELEPHONE (OUTPATIENT)
Dept: FAMILY MEDICINE | Facility: CLINIC | Age: 75
End: 2025-01-27
Payer: MEDICARE

## 2025-01-27 NOTE — TELEPHONE ENCOUNTER
----- Message from Sneha sent at 1/27/2025  8:13 AM CST -----  Regarding: Kike (daughter)    Patient is requesting a sooner appointment.  Patient declined first available appointment listed as well as another facility and provider .  Patient will not accept being placed on the waitlist and is requesting a message be sent to doctor.    Name of Caller: Kike (daughter)    When is the first available appointment? No available appt.'s    Symptoms: Patient had stroke two weeks ago and he was discharged from rehab    And is now home and his daughter is concerned about his medicaitons that was prescribed to him he was not put back on any of his diabetes medications     Would the patient rather a call back or a response via [facility]?    Best Call Back Number:[phone]    Additional Information:

## 2025-01-27 NOTE — TELEPHONE ENCOUNTER
No care due was identified.  [facility] Embedded Care Due Messages. Reference number: [id number].   1/27/2025 8:55:07 AM CST

## 2025-01-27 NOTE — TELEPHONE ENCOUNTER
----- Message from Ruby sent at 1/27/2025  8:44 AM CST -----  .Type:  Patient Returning Call    Who Called: Kike - daughter     Who Left Message for Patient:  Anthony    Does the patient know what this is regarding?: Yes     Would the patient rather a call back or a response via [facility]? Call Back     Best Call Back Number: .[phone]      Additional Information: Stated dad need a glucometer machine and strips

## 2025-01-29 RX ORDER — ATORVASTATIN CALCIUM 80 MG/1
80 TABLET, FILM COATED ORAL NIGHTLY
Qty: 90 TABLET | Refills: 3 | Status: SHIPPED | OUTPATIENT
Start: 2025-01-29

## 2025-01-29 NOTE — INTERVAL H&P NOTE
The patient has been examined and the H&P has been reviewed:    I concur with the findings and no changes have occurred since H&P was written.    Anesthesia/Surgery risks, benefits and alternative options discussed and understood by patient/family.          Active Hospital Problems    Diagnosis  POA    Lumbar radiculopathy [M54.16]  Yes      Resolved Hospital Problems    Diagnosis Date Resolved POA   No resolved problems to display.      2. The status of comorbities. (See ED/admit documents)

## 2025-01-30 DIAGNOSIS — Z00.00 ENCOUNTER FOR MEDICARE ANNUAL WELLNESS EXAM: ICD-10-CM

## 2025-01-30 RX ORDER — LANCETS
EACH MISCELLANEOUS
Qty: 200 EACH | Refills: 12 | Status: SHIPPED | OUTPATIENT
Start: 2025-01-30

## 2025-01-30 RX ORDER — INSULIN PUMP SYRINGE, 3 ML
EACH MISCELLANEOUS
Qty: 1 EACH | Refills: 12 | Status: SHIPPED | OUTPATIENT
Start: 2025-01-30 | End: 2026-01-27

## 2025-01-30 RX ORDER — BLOOD SUGAR DIAGNOSTIC
STRIP MISCELLANEOUS
Qty: 300 STRIP | Refills: 12 | Status: SHIPPED | OUTPATIENT
Start: 2025-01-30

## 2025-01-31 ENCOUNTER — TELEPHONE (OUTPATIENT)
Dept: INTERVENTIONAL RADIOLOGY/VASCULAR | Facility: CLINIC | Age: 75
End: 2025-01-31
Payer: MEDICARE

## 2025-01-31 DIAGNOSIS — I10 ESSENTIAL HYPERTENSION: ICD-10-CM

## 2025-01-31 RX ORDER — ISOSORBIDE MONONITRATE 120 MG/1
120 TABLET, EXTENDED RELEASE ORAL DAILY
Qty: 90 TABLET | Refills: 3 | Status: SHIPPED | OUTPATIENT
Start: 2025-01-31

## 2025-02-05 NOTE — PHYSICIAN QUERY
Please clarify/provide the diagnosis associated with the clinical findings.  Other encephalopathy (please specify): 2/2 acute ischemic stroke

## 2025-02-06 ENCOUNTER — PATIENT OUTREACH (OUTPATIENT)
Dept: ADMINISTRATIVE | Facility: OTHER | Age: 75
End: 2025-02-06
Payer: MEDICARE

## 2025-02-06 NOTE — TELEPHONE ENCOUNTER
No care due was identified.  [facility] Embedded Care Due Messages. Reference number: [id number].   2/06/2025 1:53:56 PM CST

## 2025-02-06 NOTE — TELEPHONE ENCOUNTER
----- Message from Joyce sent at 2/6/2025 11:42 AM CST -----  Contact: April @ [facility] [phone]  Requesting an RX refill or new RX.    Is this a refill or new RX: refill     RX name and strength (copy/paste from chart):  metFORMIN (GLUCOPHAGE-XR) 500 MG ER 24hr tablet     Is this a 30 day or 90 day RX: 90    Pharmacy name and phone # (copy/paste from chart):      [facility] - [place], [place] - [address]  [address] [address]  Phone: [phone] Fax: [phone]

## 2025-02-06 NOTE — PROGRESS NOTES
CHW - Follow Up    This Community Health Worker completed a follow up visit with patient via telephone today.  Pt/Caregiver reported: Patient has food insecurities.   Community Health Worker provided: mailed referrals to patient for food insecurities, will follow up in one week to check status of referrals and pt's future needs.  Follow up required: yes.  Follow-up Outreach - Due: 2/12/2025

## 2025-02-07 RX ORDER — METFORMIN HYDROCHLORIDE 500 MG/1
TABLET, EXTENDED RELEASE ORAL
Qty: 270 TABLET | Refills: 3 | Status: SHIPPED | OUTPATIENT
Start: 2025-02-07 | End: 2025-02-10 | Stop reason: SDUPTHER

## 2025-02-10 ENCOUNTER — OFFICE VISIT (OUTPATIENT)
Dept: FAMILY MEDICINE | Facility: CLINIC | Age: 75
End: 2025-02-10
Payer: MEDICARE

## 2025-02-10 VITALS
DIASTOLIC BLOOD PRESSURE: 64 MMHG | WEIGHT: 162.94 LBS | HEART RATE: 76 BPM | OXYGEN SATURATION: 96 % | SYSTOLIC BLOOD PRESSURE: 144 MMHG | HEIGHT: 67 IN | BODY MASS INDEX: 25.57 KG/M2 | TEMPERATURE: 98 F

## 2025-02-10 DIAGNOSIS — I63.89 ARTERIAL ISCHEMIC STROKE, MULTIFOCAL, MULTIPLE VASCULAR TERRITORIES, ACUTE: ICD-10-CM

## 2025-02-10 DIAGNOSIS — M54.50 CHRONIC MIDLINE LOW BACK PAIN WITHOUT SCIATICA: ICD-10-CM

## 2025-02-10 DIAGNOSIS — G81.90 HEMIPARESIS, UNSPECIFIED HEMIPARESIS ETIOLOGY, UNSPECIFIED LATERALITY: ICD-10-CM

## 2025-02-10 DIAGNOSIS — I63.411 STROKE DUE TO EMBOLISM OF RIGHT MIDDLE CEREBRAL ARTERY: ICD-10-CM

## 2025-02-10 DIAGNOSIS — N18.31 STAGE 3A CHRONIC KIDNEY DISEASE: ICD-10-CM

## 2025-02-10 DIAGNOSIS — F13.20 SEDATIVE DEPENDENCE: ICD-10-CM

## 2025-02-10 DIAGNOSIS — I25.700 CORONARY ARTERY DISEASE INVOLVING CORONARY BYPASS GRAFT OF NATIVE HEART WITH UNSTABLE ANGINA PECTORIS: ICD-10-CM

## 2025-02-10 DIAGNOSIS — I48.11 LONGSTANDING PERSISTENT ATRIAL FIBRILLATION: Primary | ICD-10-CM

## 2025-02-10 DIAGNOSIS — G47.00 INSOMNIA, UNSPECIFIED TYPE: ICD-10-CM

## 2025-02-10 DIAGNOSIS — Z79.4 LONG-TERM INSULIN USE: ICD-10-CM

## 2025-02-10 DIAGNOSIS — G89.29 CHRONIC MIDLINE LOW BACK PAIN WITHOUT SCIATICA: ICD-10-CM

## 2025-02-10 PROCEDURE — 3288F FALL RISK ASSESSMENT DOCD: CPT | Mod: HCNC,CPTII,S$GLB, | Performed by: INTERNAL MEDICINE

## 2025-02-10 PROCEDURE — 1101F PT FALLS ASSESS-DOCD LE1/YR: CPT | Mod: HCNC,CPTII,S$GLB, | Performed by: INTERNAL MEDICINE

## 2025-02-10 PROCEDURE — 3072F LOW RISK FOR RETINOPATHY: CPT | Mod: HCNC,CPTII,S$GLB, | Performed by: INTERNAL MEDICINE

## 2025-02-10 PROCEDURE — G2211 COMPLEX E/M VISIT ADD ON: HCPCS | Mod: HCNC,S$GLB,, | Performed by: INTERNAL MEDICINE

## 2025-02-10 PROCEDURE — 3077F SYST BP >= 140 MM HG: CPT | Mod: HCNC,CPTII,S$GLB, | Performed by: INTERNAL MEDICINE

## 2025-02-10 PROCEDURE — 1125F AMNT PAIN NOTED PAIN PRSNT: CPT | Mod: HCNC,CPTII,S$GLB, | Performed by: INTERNAL MEDICINE

## 2025-02-10 PROCEDURE — 1111F DSCHRG MED/CURRENT MED MERGE: CPT | Mod: HCNC,CPTII,S$GLB, | Performed by: INTERNAL MEDICINE

## 2025-02-10 PROCEDURE — 99215 OFFICE O/P EST HI 40 MIN: CPT | Mod: HCNC,S$GLB,, | Performed by: INTERNAL MEDICINE

## 2025-02-10 PROCEDURE — 99999 PR PBB SHADOW E&M-EST. PATIENT-LVL V: CPT | Mod: PBBFAC,HCNC,, | Performed by: INTERNAL MEDICINE

## 2025-02-10 PROCEDURE — 3078F DIAST BP <80 MM HG: CPT | Mod: HCNC,CPTII,S$GLB, | Performed by: INTERNAL MEDICINE

## 2025-02-10 PROCEDURE — 3044F HG A1C LEVEL LT 7.0%: CPT | Mod: HCNC,CPTII,S$GLB, | Performed by: INTERNAL MEDICINE

## 2025-02-10 PROCEDURE — 1159F MED LIST DOCD IN RCRD: CPT | Mod: HCNC,CPTII,S$GLB, | Performed by: INTERNAL MEDICINE

## 2025-02-10 RX ORDER — BLOOD-GLUCOSE SENSOR
EACH MISCELLANEOUS
Qty: 2 EACH | Refills: 12 | Status: SHIPPED | OUTPATIENT
Start: 2025-02-10

## 2025-02-10 RX ORDER — DULOXETIN HYDROCHLORIDE 30 MG/1
1 CAPSULE, DELAYED RELEASE ORAL 2 TIMES DAILY
COMMUNITY
Start: 2025-01-23 | End: 2025-02-22

## 2025-02-10 NOTE — PROGRESS NOTES
Chief complaint:  Discussed diabetes and stroke       Physical exam 9/24    74-year-old white male.  Back surgery 1/6, stroke 1/9/25.  Reviewed interval events.  Patient is still doing some outpatient therapy with OT PT and speech.  Still a little bit of issues getting words out but his speech is normal and he is comprehending normally.  He is still has a little bit of weak on the left but had some significant improvement.  Reassured that at least we know the cause of his stroke being AFib and being off anticoagulation.  He had a temporal bone MRI done prior to all these events and we reviewed that as well with no tumors as a cause of his prior vertigo and he was reassured about that.      he has numerous questions about medications.  Currently looks like he is off his low-dose glimepiride Tresiba and Ozempic.  He needs a new prescription for a Dexcom system as he had before.  After his stroke he has a poor taste and everything tastes salty so he is really has a poor appetite and not eating and we discussed this may change his overall diabetic treatment and regimen.  For now I would like him to get back on the Tresiba and make appropriate adjustments and hold off on getting back on Ozempic since already having a new reason for poor appetite and hold off on the glimepiride since we are not quite sure of his current intake.  Sugars will guide additional meds.    History of Present Illness: 74M. Hx HTN, HLD, Afib on Eliuquis, CAD s/p CABG, CKD3, insulin dependent diabetes. Admitted to [facility] overnight 1/9 to 1/10 following a syncopal episode w/fall, as well as encephalopathy. Seen by tele-neurology. Recommended MRI (demonstrated bilateral MCA/PCA watershed infarcts), MRA (demonstrated diminished flow in the distal BL MCA), EEG (posterior slowing), and CTA head/neck (unrevealing on 1/10). Patient exhibited an acute change this morning around 10AM, described as left hemiplegia and dysarthria. Stroke code  called, and repeat CTA demonstrated R M2 occlusion. Patient [place] [facility] for [place]IR capacity.     Hospital Course (synopsis of major diagnoses, care, treatment, and services provided during the course of the hospital stay): 1/12/25: taken for endovascular thrombectomy, RM2 recanalized spontaneously, examination improved today with less dense left-side neglect though is still present, Eliquis and Plavix both restarted, stable for step-down to NPU  1/13/25: NAEON. HDS, afebrile, sating well on room air. Repeat MRI revealing progression of damage due to stroke, although clinically pt is improving.   1/14/25: NAEON. HDS, afebrile, sating well on room air. Stable clinical exam. Stepped down today.   1/15/25: Patient to discharge to rehab today. Will continue with eliquis, plavix, and high intensity statin. Plan for follow-up with vascular neuro in 1 month.    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 He is noted to have had a recent spinal surgery on 1/6/25. He presented to [facility] on 1/9 after a fall. Per EMS, patient was at home using the bathroom when he felt light-headed and had a syncopal episode. He was found by a family member on the ground and had been confused since the fall. In ED he was noted to be confused and kept trying to stand up to leave. He was hypertensive and bradycardic to the 40s. Exam with abrasion on forehead. Labs BUN 28 Crt 1.5 eGFR 48 UA with hyaline casts. EKG with bradycardia and AFib with slow ventricular response. CTH without acute intracranial abnormality. CT lumbar spine and no evidence of osseous traumatic injury. He had difficulty ambulating. His urine drug screen was positive for opiates previously prescribed per [facility].  He was placed in observation.     Neurology was consulted for encephalopathy. On 1/10, MRI with bilateral posterior parietal cortical infarcts and MRA with diminished flow and signal and luminal narrowing in the distal distribution bilaterally. CTA was negative for any large vessel occlusion. Suspect  to AMS and confusion. BP medications were stopped to allow permissive HTN. Continue eliquis; he was not a candidate for TNK as last known normal was the day prior. EEG done 1/10 and showed no epileptiform findings, electrographic seizures, or no clinical events recorded. Echo without thrombus.     Neurosurgery was consulted on 1/11 as patient was POD#5 status post right L4/5 MIS decompression. No surgical intervention is indicated. Ok to continue patient on his eliquis and plavix; this is likely the optimal treatment for his strokes but defer to neurology. He is likely far enough out from surgery that this is safe, but be on the lookout for signs of surgical site hematoma (particularly any worsening of right foot function; he has baseline weakness in dorsiflexion).     Patient exam had improved completely but he again developed left hemiplegia and dysarthria with confusion. CT angio head and neck demonstrated right inferior M2 occlusion that was not present on the previous CT angio. Patient was transferred to [facility] on 1/11 and admitted to Neuro CC. NIHSS on arrival of 8 (left-sided inattention/neglect, dysarthria, LUE drift). He was taken for angiogram. DSA demonstrated recanalization of R M2, no intervention performed. He was admitted to [facility] for post-procedure monitoring. Etiology was felt likely cardio embolic in setting of Afib. He was restarted on his anticoagulant medications since on MRI his cortical strokes were not of significant size without any sign of hemorrhagic conversion.     On 1/12, patient was taken for endovascular thrombectomy, RM2 recanalized spontaneously. Bilateral MCA/PCA  watershed territory infarcts, likely due to hypotension in setting on syncope. On 1/13, contacted cardiology who believes the initial elevated PASP on OSH ECHO was inaccurate. They reviewed the ECHO, no significant wall motion abnormalities or right heart strain. Repeat MRI revealing progression of damage due to stroke, although clinically patient is reportedly improving. SLP assessed patient and noted he had aphasia and mild dysphagia. Patient was started on a regular diet with thin liquids.    Prior to admission, the patient was independent for self care and mobility. Upon admission to the inpatient rehab unit, the patient was setup to maximal assistance for self care and moderate to maximal assistance for mobility. Due to a decline in functional status and the need for an interdisciplinary rehab approach, the patient was admitted to [facility] acute inpatient rehabilitation unit.       HOSPITAL COURSE  The patient participated in a comprehensive inpatient rehabilitation program including physical therapy, occupational therapy and rehabilitation nursing. Please refer to UDS for discharge QIM scores    Routine admission blood cultures returned positive for Klebsiella bacteremia secondary to UTI. ID consulted and made recs:   -CTX 2 g q24h  -EOT 1/26/25  -Continue CTX inpatient, but if plans to discharge prior to completion, will step down to levofloxacin 500 mg q24h to complete course.         Patient followed previously by Endocrine nurse practitioner.  In order for him to get off mealtime insulin it appears they put him on Amaryl 1 mg in addition to his Ozempic 2 mg.  He was eating less and taking his Amaryl in the evening and having some low sugars in the evening.  He has increased his started to take slightly in his had no more low sugars.  We discussed that if indeed it is a recurrent problem is clearly the Amaryl he may need to discontinue.  He previously continued on the Ozempic and his  Tresiba which we will hold for now as above.    Also reports a remote history years ago of some vertigo he got up and looked up in the room was spinning and he had acute nausea and vomiting for two days.  For the past three days when he goes to get up and turns his head he will get some vertigo type symptoms although not as severe.  He has had some ringing in his ears and so does have an MRI scheduled and has seen ENT.  We discussed the importance of following through on that is since he has some new tinnitus and vertigo and unclear if he has hearing loss but I would imagine he would.  We discussed suppression with meclizine.  He also is working at his large having to get up on high ladders and advised him against that and until vertigo is quiet he should avoid getting on ladders, roofs and so forth.      Regarding diabetes he was managed by outside Endocrine.  His A1c has been uncontrolled chronically -8.4 in may, 7.7, 8.2, 6.2 , 6.9, 7.4, 6.6, 6.4, 6.2, 6.5 July, 6.3, 6.1 Jan .  We discussed diet improvement.  He has chosen to use regular insulin due to cost.  We discussed that it may well need to be taken 30 min before the meal and could last for hours and he was not aware of that.  He can discuss in greater detail with his endocrinologist.  Continues on tresiba 20  He is also on a weekly injection.  He was thinking about stopping it but encouraged him to do so as it may well be helping with his appetite over eating.    Counseled Regarding all these issues at length today. Over 70 min  minutes of total time spent on the encounter, which includes face to face time and non-face to face time preparing to see the patient (eg, review of tests), Obtaining and/or reviewing separately obtained history, Documenting clinical information in the electronic or other health record, Independently interpreting results (not separately reported) and communicating results to the patient/family/caregiver, or Care coordination (not  separately reported).  Hospital records reviewed and summarized at great length prior to the appointment    ROS:   CONST: weight stable. EYES: no vision change. ENT: no sore throat. CV: no chest pain w/ exertion. RESP: no shortness of breath. GI: no nausea, vomiting, diarrhea. No dysphagia. : no urinary issues. MUSCULOSKELETAL: no new myalgias or arthralgias. SKIN: no new changes. NEURO: no focal deficits. PSYCH: no new issues. ENDOCRINE: no polyuria. HEME: no lymph nodes. ALLERGY: no general pruritis.      Past medical history:  1.  Uncontrolled diabetes with neuropathy, followed by Dr. Agrawal  2.  Coronary artery disease with triple bypass March 2016, followed by the heart clinic. Now [facility], neg PET 2017, Nuc/echo neg 3/20,  3.  Back surgery 2002.  5.  Hyperlipidemia.  6.  Hypertension.  7.   colonoscopy 3 polyps 2/17 -5 polyps 7/23- 5 yrs  8.  Pneumovax and Prevnar given 2015 according to records  9.  Right leg radiculopathy, confirmed by MRI, did respond epidural with [facility] pain management  10. Partial small-bowel obstruction October 2018  11.  Abnormal TSH on occasion    Past Surgical History:   Procedure Laterality Date    ARTHROSCOPIC REPAIR OF ROTATOR CUFF OF SHOULDER Right 7/5/2022    Procedure: REPAIR, ROTATOR CUFF, ARTHROSCOPIC;  Surgeon: Valerie Olivera MD;  Location: [facility] OR;  Service: Orthopedics;  Laterality: Right;  GUADALUPE AND NEPHJENNIFER ALLAN [phone]/ TEXTED ALLAN ON 6/23/2022 @ 9:47AM. ALLAN RESPONDED ON 6/23/2022 @ 10:33AM-  JEAN PAUL BABIN [phone] MY BE HERE FOR CASE SPOKE TO HIM @ 9:59AM ON 7-1-2022  RN PREOP 6/28/2022    BACK SURGERY      2002    CATARACT EXTRACTION W/  INTRAOCULAR LENS IMPLANT Right 08/29/2017    Dr. Hong    CATARACT EXTRACTION W/  INTRAOCULAR LENS IMPLANT Left 02/24/2022    Dr. Hong    CAUDAL EPIDURAL STEROID INJECTION N/A 9/25/2024    Procedure: Caudal Epidural Steroid Injection;  Surgeon: Eze Byrd Jr., MD;  Location: [facility] PAIN  MANAGEMENT;  Service: Pain Management;  Laterality: N/A;  @1100(prev. 715)  Eliquis last 9/21  Plavix last 9/19  Check BG  E-Consent    COLONOSCOPY N/A 2/21/2017    Procedure: COLONOSCOPY;  Surgeon: Robb Rosado MD;  Location: [facility] ENDO;  Service: Endoscopy;  Laterality: N/A;    COLONOSCOPY N/A 7/5/2023    Procedure: COLONOSCOPY;  Surgeon: Aston Varela MD;  Location: [facility] ENDO;  Service: Endoscopy;  Laterality: N/A;  Referral: JOVANNI SCANLON to hold Plavix 5 days and Eliquis 2 days per Dr Purdy-OFELIA   Meds  Suprep   Inst portal   LW    CORONARY ANGIOGRAPHY INCLUDING BYPASS GRAFTS WITH CATHETERIZATION OF LEFT HEART N/A 11/11/2020    Procedure: ANGIOGRAM, CORONARY, INCLUDING BYPASS GRAFT, WITH LEFT HEART CATHETERIZATION;  Surgeon: Lane Cuellar MD;  Location: [facility] CATH LAB;  Service: Cardiology;  Laterality: N/A;  RN PRE OP 11-5-2020. --COVID NEGATIVE ON  11-. C A    CORONARY ARTERY BYPASS GRAFT      March 2016    EPIDURAL STEROID INJECTION Bilateral 11/14/2018    Procedure: Lumbar Medial Branch Blocks;  Surgeon: Eze Byrd Jr., MD;  Location: [facility] ENDO;  Service: Pain Management;  Laterality: Bilateral;  Bilateral Lumbar Medial Branch Blocks L2, L3, L4, L5    15089  14666    Arrive @ 1150; NO Sedation    EPIDURAL STEROID INJECTION Bilateral 11/28/2018    Procedure: Injection, Steroid, Epidural;  Surgeon: Eze Byrd Jr., MD;  Location: [facility];  Service: Pain Management;  Laterality: Bilateral;  Bilateral Sacroiliac Joint Steroid Injections    50111    Arrive @ 0910    EPIDURAL STEROID INJECTION Bilateral 3/20/2019    Procedure: Injection, Steroid, Sacroiliiac Joint;  Surgeon: Eze Byrd Jr., MD;  Location: [facility] ENDO;  Service: Pain Management;  Laterality: Bilateral;  Bilateral Sacroiliac Joint Steroid Injections    50904    Arrive @ 1145; Trigger point injections also?    EPIDURAL STEROID INJECTION Right 8/2/2023    Procedure: Right L5 Transforaminal Epidural  Steroid Injection;  Surgeon: Eze Byrd Jr., MD;  Location: [facility] ENDO;  Service: Pain Management;  Laterality: Right;  @0830 (given)  Eliquis last   Plavix last   Check BG    EPIDURAL STEROID INJECTION Right 10/11/2023    Procedure: Right L3 (infraneural) + S1 Transforaminal Epidural Steroid Injections;  Surgeon: Eze Byrd Jr., MD;  Location: [facility] ENDO;  Service: Pain Management;  Laterality: Right;  @0745 (requests morning)  Eliquis 10/7 & Plavix 10/5  Check BG    ESOPHAGOGASTRODUODENOSCOPY N/A 2023    Procedure: EGD (ESOPHAGOGASTRODUODENOSCOPY);  Surgeon: Anita Oswald MD;  Location: [facility] ENDO;  Service: Endoscopy;  Laterality: N/A;  Procedure Timin-4 weeks  Provider: Any GI provider  Location: No Preference  Additional Scheduling Information: Blood thinners  Prep Specifications:N/A   ref. by Dr. Light, instr. to portal, ,  meds-st  ok to hold Plavix 5 days and Eliquis 2 day    INTRAOCULAR PROSTHESES INSERTION Left 2022    Procedure: INSERTION, IOL PROSTHESIS;  Surgeon: Nickloas Hong MD;  Location: [facility] OR;  Service: Ophthalmology;  Laterality: Left;    LAMINOFORAMINOTOMY OF SPINE USING MINIMALLY INVASIVE TECHNIQUE Right 2025    Procedure: LAMINOFORAMINOTOMY, SPINE, MINIMALLY INVASIVE;  Surgeon: Luis M Ellis MD;  Location: [facility] OR;  Service: Neurosurgery;  Laterality: Right;  Right L4/5 minimally invasive laminotomy/microdiscectomy. silvana table, lucio frame, fluoro, microscope, no vendor, no monitoring  CLEARED BY CARDS AND PCP   RN PREOP 24---T/S--done -----NEED ORDERS    PHACOEMULSIFICATION OF CATARACT Left 2022    Procedure: PHACOEMULSIFICATION, CATARACT;  Surgeon: Nickolas Hong MD;  Location: [facility] OR;  Service: Ophthalmology;  Laterality: Left;  RN Phone Pre Op 22.  Covid NEGATIVE  22.  Arrival 08:00 am.    TONSILLECTOMY           Family history: Father  at 77 with stroke.  Mother  at 72  with heart problems, diabetes, hypertension.  2 sisters living in their 80s and one  at 82.  One brother  at 80 with some kidney disease and diabetes.  Sisters with diabetes, hypertension and stroke.  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  He drinks alcohol about 3 times.  Never smoked.      Vital signs as above,     Gen: no distress, speech is normal, patient will muscles move symmetrically new line gait is normal.  He can arise without difficulty.  His handgrip is 5/5 on both sides.            Assessment plan:      Diagnoses and all orders for this visit:    Longstanding persistent atrial fibrillation, back on anticoagulation and discuss that in the future should he ever need any break in anticoagulation for procedure we may want to think about using bridging anticoagulation with Lovenox to reduce the time off anticoagulation    Hemiparesis, unspecified hemiparesis etiology, unspecified laterality, continue with therapy, appears to be improving in regards to left-sided weakness and aphasia    Long-term insulin use, restart Tresiba and monitor sugars, prescription for his Dexcom system as well sent to [facility] and they will check to find out what "[employer] company may have provided it for him in the past.    Arterial ischemic stroke, multifocal, multiple vascular territories, acute, back on appropriate medications, improving    Coronary artery disease involving coronary bypass graft of native heart with unstable angina pectoris, chronic and stable    Chronic midline low back pain without sciatica, chronic and stable with some clinical improvement after back surgery    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    Stage 3a chronic kidney disease, chronic and stable    Sedative dependence, chronic and stable    Insomnia, unspecified type, chronic and stable    Other orders  -      blood-glucose sensor (DEXCOM G7 SENSOR) [place]; Check every 5 min. Change sensor every 10 days

## 2025-02-10 NOTE — PROGRESS NOTES
Wound Check   Neurosurgery     Driss Hernandez Jr. is a 74 y.o. male who presents to clinic today for 5 week wound check, s/p 1/6/24: Right MIS L4-5 laminectomy and foraminotomy with Dr. Ellis.  Denies fevers, chills, night sweats or N/V. Further denies wound drainage or swelling.     Initial postop visit was delayed due to inpatient rehab where patient was recovering from an ischemic stroke presented on postop day#4.  Stroke symptoms continued to improve.  He has transitioned to outpatient PT and OT.  He still feels he has some residual left-sided weakness.    Regarding the surgery, he remains very happy.  He has full resolution of preop leg pain.  Reports constant low back pain since surgery that is worse in the seated position.  However, he finds this to be very manageable and takes primarily Tylenol for pain control.      Physical Exam:   General: well developed, well nourished, no distress  Neurologic: Alert and oriented. Thought content appropriate.   GCS: Motor: 6/Verbal: 5/Eyes: 4 GCS Total: 15   Mental Status: Awake, Alert, Oriented x3   Cranial nerves: face symmetric, tongue midline, pupils equal, round, reactive to light with accomodation, EOMI.   Motor Strength: moves all extremities with good strength and tone 5/5 BLE and b/l hand   Sensation: response to light touch throughout  No gait disturbances     Incision is clean, dry and intact with no signs of erythema, swelling or purulent drainage.  All skin edges are completely approximated.  Small scabs still present in the middle of the incision      Vitals:    02/11/25 0652   BP: (!) 189/82   Pulse: (!) 51   Temp: 98.2 °F (36.8 °C)             Assessment/Plan:   Driss Hernandez Jr. is a 74 y.o. male who presents for 5 week wound check, s/p 1/6/24: Right MIS L4-5 laminectomy and foraminotomy with Dr. Ellis.     -Keep incision open to air   -OK to resume home blood thinner, if applicable.   -Can shower and get incision wet, just pat dry and  no vigorous scrubbing. Do not submerge incision for another 4 weeks.   -No lifting more than 10 lbs or excessive bending/twisting.   -Can drive after 4 weeks when no longer taking narcotics   -Follow up with Dr. Ellis in 6 weeks for 3 month postop evaluation  -Encouraged patient to call if they have any questions or concerns prior to next follow up appt        Bing Antonio PA-C  [facility]  Department of Neurosurgery  [phone]

## 2025-02-10 NOTE — PATIENT INSTRUCTIONS
-Keep incision open to air   -Can shower and get incision wet, just pat dry and no vigorous scrubbing. Do not submerge incision for another 4 weeks.   -No lifting more than 10 lbs or excessive bending/twisting.   -Can drive after 4 weeks when no longer taking narcotics   -Follow up with Dr. Ellis in 6 weeks for 3 month postop evaluation  -Please call with any questions or concerns prior to your next appointment.

## 2025-02-11 ENCOUNTER — OFFICE VISIT (OUTPATIENT)
Dept: NEUROSURGERY | Facility: CLINIC | Age: 75
End: 2025-02-11
Payer: MEDICARE

## 2025-02-11 VITALS
DIASTOLIC BLOOD PRESSURE: 82 MMHG | SYSTOLIC BLOOD PRESSURE: 189 MMHG | WEIGHT: 164.25 LBS | BODY MASS INDEX: 25.78 KG/M2 | HEART RATE: 51 BPM | TEMPERATURE: 98 F | HEIGHT: 67 IN | OXYGEN SATURATION: 97 %

## 2025-02-11 DIAGNOSIS — Z98.890 S/P LUMBAR LAMINECTOMY: Primary | ICD-10-CM

## 2025-02-11 PROCEDURE — 1159F MED LIST DOCD IN RCRD: CPT | Mod: CPTII,S$GLB,, | Performed by: PHYSICIAN ASSISTANT

## 2025-02-11 PROCEDURE — 3288F FALL RISK ASSESSMENT DOCD: CPT | Mod: CPTII,S$GLB,, | Performed by: PHYSICIAN ASSISTANT

## 2025-02-11 PROCEDURE — 1101F PT FALLS ASSESS-DOCD LE1/YR: CPT | Mod: CPTII,S$GLB,, | Performed by: PHYSICIAN ASSISTANT

## 2025-02-11 PROCEDURE — 3072F LOW RISK FOR RETINOPATHY: CPT | Mod: CPTII,S$GLB,, | Performed by: PHYSICIAN ASSISTANT

## 2025-02-11 PROCEDURE — 3044F HG A1C LEVEL LT 7.0%: CPT | Mod: CPTII,S$GLB,, | Performed by: PHYSICIAN ASSISTANT

## 2025-02-11 PROCEDURE — 1125F AMNT PAIN NOTED PAIN PRSNT: CPT | Mod: CPTII,S$GLB,, | Performed by: PHYSICIAN ASSISTANT

## 2025-02-11 PROCEDURE — 1160F RVW MEDS BY RX/DR IN RCRD: CPT | Mod: CPTII,S$GLB,, | Performed by: PHYSICIAN ASSISTANT

## 2025-02-11 PROCEDURE — 3079F DIAST BP 80-89 MM HG: CPT | Mod: CPTII,S$GLB,, | Performed by: PHYSICIAN ASSISTANT

## 2025-02-11 PROCEDURE — 99024 POSTOP FOLLOW-UP VISIT: CPT | Mod: S$GLB,,, | Performed by: PHYSICIAN ASSISTANT

## 2025-02-11 PROCEDURE — 3077F SYST BP >= 140 MM HG: CPT | Mod: CPTII,S$GLB,, | Performed by: PHYSICIAN ASSISTANT

## 2025-02-13 NOTE — TELEPHONE ENCOUNTER
No care due was identified.  [facility] Embedded Care Due Messages. Reference number: [id number].   2/13/2025 5:43:24 PM CST

## 2025-02-14 RX ORDER — NITROGLYCERIN 0.4 MG/1
0.4 TABLET SUBLINGUAL EVERY 5 MIN PRN
Qty: 25 TABLET | Refills: 12 | Status: SHIPPED | OUTPATIENT
Start: 2025-02-14 | End: 2026-02-14

## 2025-02-16 DIAGNOSIS — E55.9 HYPOVITAMINOSIS D: ICD-10-CM

## 2025-02-17 ENCOUNTER — PATIENT MESSAGE (OUTPATIENT)
Dept: FAMILY MEDICINE | Facility: CLINIC | Age: 75
End: 2025-02-17
Payer: MEDICARE

## 2025-02-18 RX ORDER — ERGOCALCIFEROL 1.25 MG/1
CAPSULE ORAL
Qty: 12 CAPSULE | Refills: 0 | Status: SHIPPED | OUTPATIENT
Start: 2025-02-18

## 2025-02-25 DIAGNOSIS — G47.00 INSOMNIA, UNSPECIFIED TYPE: ICD-10-CM

## 2025-02-26 ENCOUNTER — OFFICE VISIT (OUTPATIENT)
Dept: CARDIOLOGY | Facility: CLINIC | Age: 75
End: 2025-02-26
Payer: MEDICARE

## 2025-02-26 VITALS
RESPIRATION RATE: 18 BRPM | HEIGHT: 67 IN | OXYGEN SATURATION: 97 % | WEIGHT: 162.25 LBS | HEART RATE: 80 BPM | BODY MASS INDEX: 25.47 KG/M2 | DIASTOLIC BLOOD PRESSURE: 58 MMHG | SYSTOLIC BLOOD PRESSURE: 116 MMHG

## 2025-02-26 DIAGNOSIS — I73.9 PERIPHERAL VASCULAR DISEASE, UNSPECIFIED: ICD-10-CM

## 2025-02-26 DIAGNOSIS — Z98.61 HISTORY OF PERCUTANEOUS CORONARY INTERVENTION: ICD-10-CM

## 2025-02-26 DIAGNOSIS — Z79.01 LONG TERM (CURRENT) USE OF ANTICOAGULANTS: ICD-10-CM

## 2025-02-26 DIAGNOSIS — Z95.1 HX OF CABG: ICD-10-CM

## 2025-02-26 DIAGNOSIS — I10 ESSENTIAL HYPERTENSION: ICD-10-CM

## 2025-02-26 DIAGNOSIS — E78.2 MIXED HYPERLIPIDEMIA: ICD-10-CM

## 2025-02-26 DIAGNOSIS — I48.20 CHRONIC ATRIAL FIBRILLATION: ICD-10-CM

## 2025-02-26 DIAGNOSIS — I65.23 ATHEROSCLEROSIS OF BOTH CAROTID ARTERIES: ICD-10-CM

## 2025-02-26 DIAGNOSIS — I70.0 AORTIC ATHEROSCLEROSIS: ICD-10-CM

## 2025-02-26 DIAGNOSIS — R94.31 ABNORMAL EKG: ICD-10-CM

## 2025-02-26 DIAGNOSIS — I63.411 STROKE DUE TO EMBOLISM OF RIGHT MIDDLE CEREBRAL ARTERY: Primary | ICD-10-CM

## 2025-02-26 PROCEDURE — 99999 PR PBB SHADOW E&M-EST. PATIENT-LVL III: CPT | Mod: PBBFAC,HCNC,, | Performed by: INTERNAL MEDICINE

## 2025-02-26 NOTE — TELEPHONE ENCOUNTER
No care due was identified.  [facility] Embedded Care Due Messages. Reference number: [id number].   2/25/2025 9:29:01 PM CST

## 2025-02-26 NOTE — PROGRESS NOTES
CARDIOVASCULAR PROGRESS NOTE    REASON FOR CONSULT:   Driss Hernandez Jr. is a 74 y.o. male who presents for follow up of CAD, HFpEF, Chr AF.    PCP: Ehrensing  Ortho: Jarod FULLER: Caleb Hernandez Neuro: Salvador/Britni  HISTORY OF PRESENT ILLNESS:   The patient returns for follow up, accompanied by his wife.  In the interim since last office visit, he underwent a neurosurgical procedure.  This apparently was complicated by a fall and then subsequent stroke involving his right MCA.  He was transferred for urgent neurovascular intervention, but angiography apparently revealed recanalization and he was treated medically.  The above was in the setting of withdrawal of his anticoagulation for the neurosurgical procedure.  The patient has done remarkably well.  He denies any angina or dyspnea.  There has been no palpitations or syncope.  He denies PND, orthopnea, melena, hematuria, or claudication symptoms.  He does report some residual left upper extremity weakness, although his left upper extremity appears to be fully mobile on my examination today.  He is now back on his apixaban 5 mg b.i.d. as well as Plavix as ordered by neurovascular Service.  I have sent a message to the vascular neurologist to ask how long they wished to keep him on Plavix and if they wish to see him in the office in follow up.    CARDIOVASCULAR HISTORY:   CAD 2016 CABG x3 (LIMA-LAD, SVG-D, SVG-PDA)   11/11/20 cath with diffuse native disease, patent SVG x2, LIMA-LAD atretic   12/10/20: PCI OM2 2.25x26 Resolute Isiah RICO    HFpEF    PAD    Chr AF on eliquis 5mg bid    Carotid atherosclerosis, bilat (US 9/2024)    CVA (R MCA) 1/2025 (in setting of DOAC withdrawal for NUSU procedure)    PAST MEDICAL HISTORY:     Past Medical History:   Diagnosis Date    Chronic heart failure with preserved ejection fraction 2/9/2023    Chronic midline low back pain without sciatica 10/2/2017    Colon polyps     Coronary artery disease involving native coronary  artery of native heart 3/5/2020    Coronary artery disease involving native coronary artery of native heart with angina pectoris 12/10/2020    CVA (cerebral vascular accident) 1/11/2025    Diabetes mellitus with neurological manifestations, uncontrolled 1/24/2017    Diabetic polyneuropathy associated with type 2 diabetes mellitus 1/24/2017    Diabetic polyneuropathy associated with type 2 diabetes mellitus     Encephalopathy 1/10/2025    Essential hypertension 1/24/2017    Gastroesophageal reflux disease 1/24/2017    Hyperlipidemia 1/24/2017    Insomnia 1/24/2017    Long-term insulin use 1/24/2017    Longstanding persistent atrial fibrillation 12/30/2020    Lumbar spondylosis 11/13/2017    Nuclear sclerosis of both eyes 8/24/2017    Obesity     PAD (peripheral artery disease) 3/23/2022    Stage 3 chronic kidney disease 2/13/2019    Uncontrolled type 2 diabetes mellitus without complication, with long-term current use of insulin 1/24/2017       PAST SURGICAL HISTORY:     Past Surgical History:   Procedure Laterality Date    ARTHROSCOPIC REPAIR OF ROTATOR CUFF OF SHOULDER Right 7/5/2022    Procedure: REPAIR, ROTATOR CUFF, ARTHROSCOPIC;  Surgeon: Valerie Olivera MD;  Location: [facility] OR;  Service: Orthopedics;  Laterality: Right;  ANAYELI AND NEPHJENNIFER LEMUS [phone]/ TEXTED ALLAN ON 6/23/2022 @ 9:47AM. ALLAN RESPONDED ON 6/23/2022 @ 10:33AM-BREANNA BABIN [phone] MY BE HERE FOR CASE SPOKE TO HIM @ 9:59AM ON 7-1-2022  RN PREOP 6/28/2022    BACK SURGERY      2002    CATARACT EXTRACTION W/  INTRAOCULAR LENS IMPLANT Right 08/29/2017    Dr. Hong    CATARACT EXTRACTION W/  INTRAOCULAR LENS IMPLANT Left 02/24/2022    Dr. Hong    CAUDAL EPIDURAL STEROID INJECTION N/A 9/25/2024    Procedure: Caudal Epidural Steroid Injection;  Surgeon: Eze Byrd Jr., MD;  Location: [facility] PAIN MANAGEMENT;  Service: Pain Management;  Laterality: N/A;  @1100(prev. 715)  Eliquis last 9/21  Plavix last 9/19  Check  BG  E-Consent    COLONOSCOPY N/A 2/21/2017    Procedure: COLONOSCOPY;  Surgeon: Robb Rosado MD;  Location: [facility] ENDO;  Service: Endoscopy;  Laterality: N/A;    COLONOSCOPY N/A 7/5/2023    Procedure: COLONOSCOPY;  Surgeon: Aston Varela MD;  Location: [facility] ENDO;  Service: Endoscopy;  Laterality: N/A;  Referral: JOVANNI SCANLON  ok to hold Plavix 5 days and Eliquis 2 days per Dr Liliana MUJICA Meds  Suprep   Inst portal   LW    CORONARY ANGIOGRAPHY INCLUDING BYPASS GRAFTS WITH CATHETERIZATION OF LEFT HEART N/A 11/11/2020    Procedure: ANGIOGRAM, CORONARY, INCLUDING BYPASS GRAFT, WITH LEFT HEART CATHETERIZATION;  Surgeon: Lane Cuellar MD;  Location: [facility] CATH LAB;  Service: Cardiology;  Laterality: N/A;  RN PRE OP 11-5-2020. --COVID NEGATIVE ON  11-. C A    CORONARY ARTERY BYPASS GRAFT      March 2016    EPIDURAL STEROID INJECTION Bilateral 11/14/2018    Procedure: Lumbar Medial Branch Blocks;  Surgeon: Eze Byrd Jr., MD;  Location: [facility];  Service: Pain Management;  Laterality: Bilateral;  Bilateral Lumbar Medial Branch Blocks L2, L3, L4, L5    [id number]  06461    Arrive @ 1150; NO Sedation    EPIDURAL STEROID INJECTION Bilateral 11/28/2018    Procedure: Injection, Steroid, Epidural;  Surgeon: Eze Byrd Jr., MD;  Location: [facility];  Service: Pain Management;  Laterality: Bilateral;  Bilateral Sacroiliac Joint Steroid Injections    [id number]    Arrive @ 0910    EPIDURAL STEROID INJECTION Bilateral 3/20/2019    Procedure: Injection, Steroid, Sacroiliiac Joint;  Surgeon: Eze Byrd Jr., MD;  Location: [facility];  Service: Pain Management;  Laterality: Bilateral;  Bilateral Sacroiliac Joint Steroid Injections    [id number]    Arrive @ 1145; Trigger point injections also?    EPIDURAL STEROID INJECTION Right 8/2/2023    Procedure: Right L5 Transforaminal Epidural Steroid Injection;  Surgeon: Eze Byrd Jr., MD;  Location: [facility];  Service: Pain Management;   Laterality: Right;  @0830 (given)  Eliquis last   Plavix last   Check BG    EPIDURAL STEROID INJECTION Right 10/11/2023    Procedure: Right L3 (infraneural) + S1 Transforaminal Epidural Steroid Injections;  Surgeon: Eze Byrd Jr., MD;  Location: [facility] ENDO;  Service: Pain Management;  Laterality: Right;  @0745 (requests morning)  Eliquis 10/7 & Plavix 10/5  Check BG    ESOPHAGOGASTRODUODENOSCOPY N/A 2023    Procedure: EGD (ESOPHAGOGASTRODUODENOSCOPY);  Surgeon: Anita Oswald MD;  Location: [facility] ENDO;  Service: Endoscopy;  Laterality: N/A;  Procedure Timin-4 weeks  Provider: Any GI provider  Location: No Preference  Additional Scheduling Information: Blood thinners  Prep Specifications:N/A   ref. by Dr. Light, instr. to portal, , [facility] meds-st  ok to hold Plavix 5 days and Eliquis 2 day    INTRAOCULAR PROSTHESES INSERTION Left 2022    Procedure: INSERTION, IOL PROSTHESIS;  Surgeon: Nickolas Hong MD;  Location: [facility] OR;  Service: Ophthalmology;  Laterality: Left;    LAMINOFORAMINOTOMY OF SPINE USING MINIMALLY INVASIVE TECHNIQUE Right 2025    Procedure: LAMINOFORAMINOTOMY, SPINE, MINIMALLY INVASIVE;  Surgeon: Luis M Ellis MD;  Location: [facility] OR;  Service: Neurosurgery;  Laterality: Right;  Right L4/5 minimally invasive laminotomy/microdiscectomy. silvana table, lucio frame, fluoro, microscope, no vendor, no monitoring  CLEARED BY CARDS AND PCP   RN PREOP 24---T/S--done -----NEED ORDERS    PHACOEMULSIFICATION OF CATARACT Left 2022    Procedure: PHACOEMULSIFICATION, CATARACT;  Surgeon: Nickolas Hong MD;  Location: [facility] OR;  Service: Ophthalmology;  Laterality: Left;  RN Phone Pre Op 22.  Covid NEGATIVE  22.  Arrival 08:00 am.    TONSILLECTOMY         ALLERGIES AND MEDICATION:     Review of patient's allergies indicates:   Allergen Reactions    Penicillins Other (See Comments)     Unknown reaction, Had a reaction as a child     Shrimp Itching     Hand Itching        Medication List            Accurate as of February 26, 2025  9:28 AM. If you have any questions, ask your nurse or doctor.                CONTINUE taking these medications      ACCU-CHEK MOIZ PLUS TEST STRP Strp  Generic drug: blood sugar diagnostic  TEST THREE TIMES DAILY     albuterol 90 mcg/actuation inhaler  Commonly known as: PROVENTIL/VENTOLIN HFA  Inhale 2 puffs into the lungs every 4 (four) hours as needed for Shortness of Breath. Rescue     apixaban 5 mg Tab  Commonly known as: ELIQUIS  Take 1 tablet (5 mg total) by mouth 2 (two) times daily.     ascorbic acid (vitamin C) 100 MG tablet  Commonly known as: VITAMIN C     atorvastatin 80 MG tablet  Commonly known as: LIPITOR  TAKE 1 TABLET EVERY EVENING     blood-glucose meter kit  Test glucose 2-3x/day. True metrix     clopidogreL 75 mg tablet  Commonly known as: PLAVIX     coenzyme Q10 100 mg capsule  Take 1 capsule (100 mg total) by mouth once daily.     dapagliflozin propanediol 5 mg Tab tablet  Commonly known as: Farxiga  Take 1 tablet (5 mg total) by mouth once daily.     DEXCOM G7 SENSOR [place]  Generic drug: blood-glucose sensor  Check every 5 min. Change sensor every 10 days     ergocalciferol 50,000 unit Cap  Commonly known as: ERGOCALCIFEROL  TAKE 1 CAPSULE BY MOUTH WEEKLY     fenofibrate 160 MG Tab  TAKE 1 TABLET EVERY MORNING     fluticasone propionate 50 mcg/actuation nasal spray  Commonly known as: FLONASE  2 sprays (100 mcg total) by Each Nostril route 2 (two) times daily.     gabapentin 300 MG capsule  Commonly known as: NEURONTIN  Take 2 capsules (600 mg total) by mouth 2 (two) times daily.     glimepiride 1 MG tablet  Commonly known as: AMARYL  TAKE 1 TABLET BEFORE BREAKFAST     hydrALAZINE 50 MG tablet  Commonly known as: APRESOLINE  TAKE 1 TABLET TWICE DAILY     HYDROcodone-acetaminophen  mg per tablet  Commonly known as: NORCO  Take 1 tablet by mouth every 4 (four) hours as needed for Pain  "(7-10/10 pain).     insulin degludec 100 unit/mL (3 mL) insulin pen  Commonly known as: TRESIBA FLEXTOUCH U-100  Inject 20 Units into the skin once daily.     isosorbide mononitrate 120 MG 24 hr tablet  Commonly known as: IMDUR  Take 1 tablet (120 mg total) by mouth once daily.     lancets Misc  Check blood glucose 2x/day. True metrix. E11.65     meclizine 25 mg tablet  Commonly known as: ANTIVERT  Take 1 tablet (25 mg total) by mouth 3 (three) times daily as needed for Dizziness (or at least one HS for 1 week when vertigo active).     metFORMIN 500 MG ER 24hr tablet  Commonly known as: GLUCOPHAGE-XR  Take 1 tablet with breakfast and 2 tablets with supper.     nitroGLYCERIN 0.4 MG SL tablet  Commonly known as: NITROSTAT  Place 1 tablet (0.4 mg total) under the tongue every 5 (five) minutes as needed for Chest pain.     NOVOFINE 32 32 gauge x 1/4" Ndle  Generic drug: pen needle, diabetic  Use daily     omega-3 acid ethyl esters 1 gram capsule  Commonly known as: LOVAZA  Take 2 capsules (2 g total) by mouth 2 (two) times daily.     OZEMPIC 2 mg/dose (8 mg/3 mL) Pnij  Generic drug: semaglutide  Inject 2 mg into the skin every 7 days.     pantoprazole 40 MG tablet  Commonly known as: PROTONIX  TAKE 1 TABLET EVERY DAY     triazolam 0.25 MG tablet  TAKE 1 TABLET BY MOUTH EVERY NIGHT AT BEDTIME AS NEEDED            STOP taking these medications      (MAGIC MOUTHWASH) 1:1:1 BENADRYL 12.5MG/5ML LIQ, ALUMINUM & MAGNESIUM  Stopped by: Eze Purdy MD     b complex vitamins tablet  Stopped by: Eze Purdy MD     furosemide 20 MG tablet  Commonly known as: LASIX  Stopped by: Eze Purdy MD     methocarbamoL 750 MG Tab  Commonly known as: ROBAXIN  Stopped by: Eze Purdy MD     tamsulosin 0.4 mg Cap  Commonly known as: FLOMAX  Stopped by: Eze Purdy MD              SOCIAL HISTORY:     Social History     Socioeconomic History    Marital status:    Tobacco Use    Smoking status: Never     " Passive exposure: Never    Smokeless tobacco: Never   Substance and Sexual Activity    Alcohol use: Not Currently    Drug use: No    Sexual activity: Yes     Partners: Female     Social Drivers of Health     Financial Resource Strain: Low Risk  (2/3/2025)    Overall Financial Resource Strain (CARDIA)     Difficulty of Paying Living Expenses: Not hard at all   Food Insecurity: Food Insecurity Present (2/6/2025)    Hunger Vital Sign     Worried About Running Out of Food in the Last Year: Often true     Ran Out of Food in the Last Year: Often true   Transportation Needs: No Transportation Needs (2/6/2025)    TRANSPORTATION NEEDS     Transportation : No   Physical Activity: Inactive (2/3/2025)    Exercise Vital Sign     Days of Exercise per Week: 0 days     Minutes of Exercise per Session: 0 min   Stress: No Stress Concern Present (2/3/2025)    [employer] - Occupational Stress Questionnaire     Feeling of Stress : Not at all   Housing Stability: Unknown (2/3/2025)    Housing Stability Vital Sign     Unable to Pay for Housing in the Last Year: No     Homeless in the Last Year: No   Recent Concern: Housing Stability - High Risk (1/15/2025)    Received from [facility]    Housing Stability Vital Sign     Unable to Pay for Housing in the Last Year: Yes     Number of Times Moved in the Last Year: 1     Homeless in the Last Year: Yes       FAMILY HISTORY:     Family History   Problem Relation Name Age of Onset    Depression Mother      Heart disease Mother      Stroke Father      No Known Problems Sister Elaine     Diabetes Sister Diclia     No Known Problems Sister Idalia     Blindness Brother Burt     No Known Problems Maternal Aunt      No Known Problems Maternal Uncle      No Known Problems Paternal Aunt      No Known Problems Paternal Uncle      No Known Problems Maternal Grandmother      No Known Problems Maternal Grandfather      No Known Problems Paternal Grandmother      No Known  "Problems Paternal Grandfather      Amblyopia Neg Hx      Cancer Neg Hx      Cataracts Neg Hx      Glaucoma Neg Hx      Hypertension Neg Hx      Macular degeneration Neg Hx      Retinal detachment Neg Hx      Strabismus Neg Hx      Thyroid disease Neg Hx         REVIEW OF SYSTEMS:   Review of Systems   Constitutional:  Negative for chills, diaphoresis, fever and malaise/fatigue.   HENT:  Negative for nosebleeds.    Eyes:  Negative for blurred vision, double vision and photophobia.   Respiratory:  Negative for cough, hemoptysis, shortness of breath and wheezing.    Cardiovascular:  Negative for chest pain, palpitations, orthopnea, claudication, leg swelling and PND.   Gastrointestinal:  Negative for abdominal pain, blood in stool, heartburn, melena, nausea and vomiting.   Genitourinary:  Negative for flank pain and hematuria.   Musculoskeletal:  Negative for falls, joint pain, myalgias and neck pain.   Skin:  Negative for rash.   Neurological:  Negative for dizziness, seizures, loss of consciousness, weakness and headaches.   Endo/Heme/Allergies:  Negative for polydipsia. Does not bruise/bleed easily.   Psychiatric/Behavioral:  Negative for depression and memory loss. The patient is not nervous/anxious.        PHYSICAL EXAM:     Vitals:    02/26/25 0900   BP: (!) 116/58   Pulse: 80   Resp: 18    Body mass index is 25.41 kg/m².  Weight: 73.6 kg (162 lb 4.1 oz)   Height: 5' 7" (170.2 cm)     Physical Exam  Vitals reviewed.   Constitutional:       General: He is not in acute distress.     Appearance: Normal appearance. He is well-developed. He is not ill-appearing, toxic-appearing or diaphoretic.   HENT:      Head: Normocephalic and atraumatic.   Eyes:      General: No scleral icterus.     Extraocular Movements: Extraocular movements intact.      Conjunctiva/sclera: Conjunctivae normal.      Pupils: Pupils are equal, round, and reactive to light.   Neck:      Thyroid: No thyromegaly.      Vascular: Normal carotid pulses. " No carotid bruit or JVD.      Trachea: Trachea normal.   Cardiovascular:      Rate and Rhythm: Normal rate. Rhythm irregularly irregular.      Heart sounds: S1 normal and S2 normal. Heart sounds are distant. No murmur heard.     No friction rub. No gallop.   Pulmonary:      Effort: Pulmonary effort is normal. No respiratory distress.      Breath sounds: Normal breath sounds. No stridor. No wheezing, rhonchi or rales.   Chest:      Chest wall: No tenderness.   Abdominal:      General: There is no distension.      Palpations: Abdomen is soft.   Musculoskeletal:         General: No swelling or tenderness.      Cervical back: Normal range of motion and neck supple. No edema or rigidity.      Right lower leg: No edema.      Left lower leg: No edema.   Feet:      Right foot:      Skin integrity: No ulcer.      Left foot:      Skin integrity: No ulcer.   Skin:     General: Skin is warm and dry.      Coloration: Skin is not jaundiced.   Neurological:      General: No focal deficit present.      Mental Status: He is alert and oriented to person, place, and time.      Cranial Nerves: No cranial nerve deficit.   Psychiatric:         Mood and Affect: Mood normal.         Speech: Speech normal.         Behavior: Behavior normal. Behavior is cooperative.         DATA:   EKG: (personally reviewed tracing(s))  12/18/24 AF 44, PRWP, similar to 6/10/24    Laboratory:  CBC:  Recent Labs   Lab 01/12/25  0028 01/13/25  0124 01/14/25  0104   WBC 7.50 7.63 11.63   Hemoglobin 13.2 L 13.2 L 13.4 L   Hematocrit 40.4 39.5 L 39.9 L   Platelets 238 235 237       CHEMISTRIES:  Recent Labs   Lab 04/28/22  0923 05/11/22  0722 06/09/22  0826 08/11/22  0931 01/12/25  0028 01/13/25  0124 01/14/25  0104 01/16/25  0557 01/18/25  1120 01/20/25  0659   Glucose 146 H  --  126 H   < > 74 236 H 125 H  --   --   --    Sodium 141  --  143   < > 137 134 L 139 134 L 133 L 134 L   Potassium 4.9  --  4.9   < > 3.9 4.1 4.3 4.2 4.4 4.1   BUN 30 H  --  33 H   < > 17  22 23 22.8 23.2 19.1   Creatinine 1.5 H 1.5 H 1.5 H   < > 0.9 1.3 1.2 1.16 1.01 0.97   eGFR if  53 A 53.0 A 53.0 A  --   --   --   --   --   --   --    eGFR if non  46 A 45.9 A 45.9 A  --   --   --   --   --   --   --    Calcium 9.7  --  10.2   < > 9.2 9.2 9.1 9.3 9.2 9.3   Magnesium  --   --   --    < > 1.7 2.0 1.9  --   --   --     < > = values in this interval not displayed.       CARDIAC BIOMARKERS:  Recent Labs   Lab 06/24/23 2118 06/24/23  2329   Troponin I 0.050 H 0.054 H       COAGS:  Recent Labs   Lab 01/06/25  0621 01/09/25  1113 01/11/25  1733   INR 1.1 1.1 1.2       LIPIDS/LFTS:  Recent Labs   Lab 06/16/23  0953 06/24/23 2118 07/24/24  0735 09/05/24  0920 01/11/25  1733 01/12/25  0028 01/13/25  0124 01/14/25  0104 01/18/25  1120   Cholesterol 141  --  138  --  125  --   --   --   --    Triglycerides 104  --  195 H  --  78  --   --   --   --    HDL 28 L  --  29 L  --  27 L  --   --   --   --    LDL Cholesterol 92.2  --  70.0  --  82.4  --   --   --   --    Non-HDL Cholesterol 113  --  109  --  98  --   --   --   --    AST 18   < >  --    < >  --    < > 20 28 21   ALT 13   < >  --    < >  --    < > 14 17 13    < > = values in this interval not displayed.       Cardiovascular Testing:  [facility] Echo 1/13/25    Limited TTE for wall motion abnormalities in s/o stroke.    Left Ventricle: The left ventricle is normal in size. Ventricular mass is normal. Normal wall thickness. Normal wall motion. There is normal systolic function with a visually estimated ejection fraction of 55 - 60%. Unable to assess diastolic function due to atrial fibrillation.    Right Ventricle: Normal right ventricular cavity size. Systolic function is normal.    Echo 1/10/25    Left Ventricle: The left ventricle is normal in size. Mildly increased wall thickness. There is mild concentric hypertrophy. There is normal systolic function with a visually estimated ejection fraction of 55 - 60%. Unable to assess  diastolic function due to atrial fibrillation.    Right Ventricle: Systolic function is normal.    Aortic Valve: The aortic valve is a trileaflet valve. There is moderate aortic valve sclerosis. Mildly restricted motion. There is mild stenosis. Aortic valve area by VTI is 1.9 cm². Aortic valve peak velocity is 1.5 m/s. Mean gradient is 5.3 mmHg. The dimensionless index is 0.59.    Tricuspid Valve: There is mild regurgitation.    Pulmonary Artery: The estimated pulmonary artery systolic pressure is 93 mmHg.    Pt in AFib throughout exam.    Carotid US 9/10/24    There is 40-49% right Internal Carotid Stenosis.    There is 60-69% left Internal Carotid Stenosis.    >50% bilat ECA stenosis.    Compared with prior report dated 03/19/2024: left ICA stenosis has progressed; right ICA stenosis is stable.    Holter 4/2/24    Atrial fibrillation    Heart rates varied between 31 and 103 BPM with an average of 57 BPM.    Longest R-R interval 3.6sec at 12:39am.    There were occasional PVCs totalling 1185 and averaging 24.69 per hour. There were 10 couplets. There were 3 bigeminal cycles.    The diary was not returned.    Ex MPI 3/13/23     The patient exercised for 8 minutes 16 seconds on a Marcial protocol, corresponding to a functional capacity of 9 METS, achieving a peak heart rate of 127 bpm, which is 86 % of the age predicted maximum heart rate. Their exercise capacity was above average.    Normal myocardial perfusion scan. There is no evidence of myocardial ischemia or infarction.    There is a mild to moderate intensity perfusion abnormality in the anteroseptal wall of the left ventricle consistent with the RV insertion site.    The gated perfusion images showed an ejection fraction of 66% post stress.    There is normal wall motion post stress.    The ECG portion of the study is negative for ischemia.    The patient reported no chest pain during the stress test.    During stress, rare PVCs are noted.    The exercise  capacity was above average.    LE venous US 4/20/22  There is no evidence of a right lower extremity DVT.  There is no evidence of a left lower extremity DVT.  1.2x0.6cm non vascular structure noted in left proximal calf.    PCI OM1 12/10/20  1.  90% mid OM2 stenosis.  2.  Successful PCI with placement of a 2.25 x 26 mm drug-eluting stent reducing the 90% stenosis to 0%.  INGRIS 3 flow.  No dissection.  Good angiographic results.    Cath 11/11/20   Left Main   The vessel is angiographically normal.   Left Anterior Descending   Subtotal mid occlusion. Diffusely diseased distal vessel   Left Circumflex   Long mid 80% between OM1 and OM2   First Obtuse Marginal Branch   90% mid   Second Obtuse Marginal Branch   80% mid   Right Coronary Artery   80% mid - moderate disffuse distal disease   LIMA Graft To Mid LAD   Mid to distal graft diffusely diseased 0 multile 80-90% lesions. Small diffusely diseased distal LAD   Saphenous Graft To RPDA   Patent with good runoff to PDA   Graft To 1st Diag   Patent to D1 with good runoff     Ex NUSRAT 4/18/18  Resting NUSRAT:   The right ankle brachial index was 1.04 which is normal.   The left ankle brachial index was 1.08 which is normal.   The right TBI is 0.55.   The left TBI is 0.98.   Exercise NUSRAT:   Post exercise right ankle pressure was 113 mmHg, resulting in a right post-exercise NUSRAT of 0.67.   Post exercise left ankle pressure was 150 mmHg, resulting in a left post-exercise NUSRAT of 0.89.       ASSESSMENT:   # Chr AF on eliquis 5mg bid, HR controlled (?too slow).  Holter 3/2023 OK, no evidence of chronotropic incompetence on Ex MPI 3/2023. Fatigue resolved.  No significant bradycardia on holter 4/2024 (longest RR interval during sleeping hours).  # CAD s/p CABG/PCI OM 12/2020, asymtomatic.  MPI 3/2023 neg.  # HTN, controlled  # HFpEF, euvolemic  # CVA (R MCA) 1/11/25 in setting of DOAC withdrawal for surgical procedure, now on plavix as well as apixaban.  # CKD3a, elev creat/K+ with  losartan, normalized when stopped losartan.  # HLP on atorva 80mg  # PAD, abnl NUSRAT 4/18/18  # DM  # carotid atherosclerosis (CUS 9/2024)  # L>R LE edema, resolved.  LE venous US 4/2022 neg.  # aortic atherosclerosis (CT Chest 10/29/18)    PLAN:   Cont med rx  Cont plavix at direction of [facility], message sent to Alexandra Franco and Britni for direction as to duration of rx.  Cont eliquis 5mg bid  RTC 3 months with surveillance Carotid US (June 2025)  Consider bridging anticoagulatuion for procedures requiring DOAC withdrawal in the future given CVA 1/2025 with withdrawal of apixaban.  Watchman referral as contingency.    Complex OV, multiple records reviewed.    The above documents medical care services that are part of ongoing care related to this patient's serious/complex condition (Code ) (AF, HTN, CAD/HFpEF).        Eze Pudry MD, [facility]    Addendum 2/26/25  Eze Muhammad MD Castine, Michael R., MD  Caller: Unspecified (Today,  9:50 AM)  That is not in the current guidelines as far as I know. We could try, but it is all dependent on insurance review. I had one last week cancelled since they felt that extensive bruising was not enough. Exact same scenario on another patient was approved.          Previous Messages       ----- Message -----  From: Eze Purdy MD  Sent: 2/26/2025   9:51 AM CST  To: Eze Muhammad MD  Subject: ?watchman indication                            Is there an indication to implant a Watchman in a patient who had a major stroke after DOAC withdrawal for an elective surgery?  He's doing amazingly well, but I have obvious concerns about withdrawing his apixaban in the future if needed.    Petr

## 2025-02-27 ENCOUNTER — PATIENT MESSAGE (OUTPATIENT)
Dept: FAMILY MEDICINE | Facility: CLINIC | Age: 75
End: 2025-02-27
Payer: MEDICARE

## 2025-02-27 DIAGNOSIS — G47.00 INSOMNIA, UNSPECIFIED TYPE: ICD-10-CM

## 2025-02-27 RX ORDER — TRIAZOLAM 0.25 MG/1
TABLET ORAL
Qty: 90 TABLET | Refills: 5 | Status: SHIPPED | OUTPATIENT
Start: 2025-02-27

## 2025-02-27 RX ORDER — TRIAZOLAM 0.25 MG/1
TABLET ORAL
Qty: 90 TABLET | Refills: 5 | Status: SHIPPED | OUTPATIENT
Start: 2025-02-27 | End: 2025-02-27 | Stop reason: SDUPTHER

## 2025-02-27 NOTE — TELEPHONE ENCOUNTER
No care due was identified.  Health [facility] Embedded Care Due Messages. Reference number: [id number].   2/27/2025 9:31:34 AM CST

## 2025-03-05 ENCOUNTER — HOSPITAL ENCOUNTER (EMERGENCY)
Facility: HOSPITAL | Age: 75
Discharge: HOME | End: 2025-03-05
Attending: EMERGENCY MEDICINE
Payer: MEDICARE

## 2025-03-05 ENCOUNTER — TELEPHONE (OUTPATIENT)
Dept: FAMILY MEDICINE | Facility: CLINIC | Age: 75
End: 2025-03-05
Payer: MEDICARE

## 2025-03-05 VITALS
HEART RATE: 68 BPM | WEIGHT: 162 LBS | TEMPERATURE: 98 F | BODY MASS INDEX: 25.37 KG/M2 | RESPIRATION RATE: 20 BRPM | DIASTOLIC BLOOD PRESSURE: 59 MMHG | OXYGEN SATURATION: 99 % | SYSTOLIC BLOOD PRESSURE: 129 MMHG

## 2025-03-05 DIAGNOSIS — R41.82 ALTERED MENTAL STATUS, UNSPECIFIED ALTERED MENTAL STATUS TYPE: Primary | ICD-10-CM

## 2025-03-05 DIAGNOSIS — R35.0 URINE FREQUENCY: Primary | ICD-10-CM

## 2025-03-05 DIAGNOSIS — R82.71 BACTERIURIA: ICD-10-CM

## 2025-03-05 LAB
ALBUMIN SERPL BCP-MCNC: 3.9 G/DL (ref 3.5–5.2)
ALP SERPL-CCNC: 27 U/L (ref 40–150)
ALT SERPL W/O P-5'-P-CCNC: 17 U/L (ref 10–44)
ANION GAP SERPL CALC-SCNC: 8 MMOL/L (ref 8–16)
AST SERPL-CCNC: 20 U/L (ref 10–40)
BACTERIA #/AREA URNS HPF: ABNORMAL /HPF
BASOPHILS # BLD AUTO: 0.04 K/UL (ref 0–0.2)
BASOPHILS NFR BLD: 0.3 % (ref 0–1.9)
BILIRUB SERPL-MCNC: 1 MG/DL (ref 0.1–1)
BILIRUB UR QL STRIP: NEGATIVE
BUN SERPL-MCNC: 30 MG/DL (ref 8–23)
CALCIUM SERPL-MCNC: 9.4 MG/DL (ref 8.7–10.5)
CHLORIDE SERPL-SCNC: 109 MMOL/L (ref 95–110)
CHOLEST SERPL-MCNC: 139 MG/DL (ref 120–199)
CHOLEST/HDLC SERPL: 5.6 {RATIO} (ref 2–5)
CLARITY UR: CLEAR
CO2 SERPL-SCNC: 21 MMOL/L (ref 23–29)
COLOR UR: YELLOW
CREAT SERPL-MCNC: 1.2 MG/DL (ref 0.5–1.4)
DIFFERENTIAL METHOD BLD: ABNORMAL
EOSINOPHIL # BLD AUTO: 0 K/UL (ref 0–0.5)
EOSINOPHIL NFR BLD: 0.2 % (ref 0–8)
ERYTHROCYTE [DISTWIDTH] IN BLOOD BY AUTOMATED COUNT: 17.4 % (ref 11.5–14.5)
EST. GFR  (NO RACE VARIABLE): >60 ML/MIN/1.73 M^2
GLUCOSE SERPL-MCNC: 112 MG/DL (ref 70–110)
GLUCOSE SERPL-MCNC: 114 MG/DL (ref 70–110)
GLUCOSE UR QL STRIP: ABNORMAL
HCT VFR BLD AUTO: 40 % (ref 40–54)
HDLC SERPL-MCNC: 25 MG/DL (ref 40–75)
HDLC SERPL: 18 % (ref 20–50)
HGB BLD-MCNC: 12.7 G/DL (ref 14–18)
HGB UR QL STRIP: NEGATIVE
IMM GRANULOCYTES # BLD AUTO: 0.07 K/UL (ref 0–0.04)
IMM GRANULOCYTES NFR BLD AUTO: 0.5 % (ref 0–0.5)
INR PPP: 1.2 (ref 0.8–1.2)
KETONES UR QL STRIP: NEGATIVE
LDLC SERPL CALC-MCNC: 92.2 MG/DL (ref 63–159)
LEUKOCYTE ESTERASE UR QL STRIP: NEGATIVE
LYMPHOCYTES # BLD AUTO: 1 K/UL (ref 1–4.8)
LYMPHOCYTES NFR BLD: 7.2 % (ref 18–48)
MCH RBC QN AUTO: 27.3 PG (ref 27–31)
MCHC RBC AUTO-ENTMCNC: 31.8 G/DL (ref 32–36)
MCV RBC AUTO: 86 FL (ref 82–98)
MICROSCOPIC COMMENT: ABNORMAL
MONOCYTES # BLD AUTO: 1.5 K/UL (ref 0.3–1)
MONOCYTES NFR BLD: 10.5 % (ref 4–15)
NEUTROPHILS # BLD AUTO: 11.3 K/UL (ref 1.8–7.7)
NEUTROPHILS NFR BLD: 81.3 % (ref 38–73)
NITRITE UR QL STRIP: NEGATIVE
NONHDLC SERPL-MCNC: 114 MG/DL
NRBC BLD-RTO: 0 /100 WBC
PH UR STRIP: 8 [PH] (ref 5–8)
PLATELET # BLD AUTO: 208 K/UL (ref 150–450)
PMV BLD AUTO: 10.2 FL (ref 9.2–12.9)
POCT GLUCOSE: 114 MG/DL (ref 70–110)
POTASSIUM SERPL-SCNC: 4.6 MMOL/L (ref 3.5–5.1)
PROT SERPL-MCNC: 7.4 G/DL (ref 6–8.4)
PROT UR QL STRIP: NEGATIVE
PROTHROMBIN TIME: 13.4 SEC (ref 9–12.5)
RBC # BLD AUTO: 4.65 M/UL (ref 4.6–6.2)
SODIUM SERPL-SCNC: 138 MMOL/L (ref 136–145)
SP GR UR STRIP: >1.03 (ref 1–1.03)
TRIGL SERPL-MCNC: 109 MG/DL (ref 30–150)
TSH SERPL DL<=0.005 MIU/L-ACNC: 2.7 UIU/ML (ref 0.4–4)
URN SPEC COLLECT METH UR: ABNORMAL
UROBILINOGEN UR STRIP-ACNC: NEGATIVE EU/DL
WBC # BLD AUTO: 13.95 K/UL (ref 3.9–12.7)
WBC #/AREA URNS HPF: 9 /HPF (ref 0–5)
YEAST URNS QL MICRO: ABNORMAL

## 2025-03-05 PROCEDURE — 96374 THER/PROPH/DIAG INJ IV PUSH: CPT | Mod: HCNC

## 2025-03-05 PROCEDURE — 99285 EMERGENCY DEPT VISIT HI MDM: CPT | Mod: 25,HCNC

## 2025-03-05 PROCEDURE — 85025 COMPLETE CBC W/AUTO DIFF WBC: CPT | Mod: HCNC | Performed by: PHYSICIAN ASSISTANT

## 2025-03-05 PROCEDURE — 80053 COMPREHEN METABOLIC PANEL: CPT | Mod: HCNC | Performed by: PHYSICIAN ASSISTANT

## 2025-03-05 PROCEDURE — 85610 PROTHROMBIN TIME: CPT | Mod: HCNC | Performed by: PHYSICIAN ASSISTANT

## 2025-03-05 PROCEDURE — 81000 URINALYSIS NONAUTO W/SCOPE: CPT | Mod: HCNC | Performed by: EMERGENCY MEDICINE

## 2025-03-05 PROCEDURE — 82962 GLUCOSE BLOOD TEST: CPT | Mod: HCNC

## 2025-03-05 PROCEDURE — 84443 ASSAY THYROID STIM HORMONE: CPT | Mod: HCNC | Performed by: PHYSICIAN ASSISTANT

## 2025-03-05 PROCEDURE — 25000003 PHARM REV CODE 250: Mod: HCNC | Performed by: EMERGENCY MEDICINE

## 2025-03-05 PROCEDURE — 63600175 PHARM REV CODE 636 W HCPCS: Mod: HCNC | Performed by: EMERGENCY MEDICINE

## 2025-03-05 PROCEDURE — 80061 LIPID PANEL: CPT | Mod: HCNC | Performed by: PHYSICIAN ASSISTANT

## 2025-03-05 PROCEDURE — 93010 ELECTROCARDIOGRAM REPORT: CPT | Mod: HCNC,,, | Performed by: INTERNAL MEDICINE

## 2025-03-05 PROCEDURE — 96361 HYDRATE IV INFUSION ADD-ON: CPT | Mod: HCNC

## 2025-03-05 PROCEDURE — 25500020 PHARM REV CODE 255: Mod: HCNC | Performed by: EMERGENCY MEDICINE

## 2025-03-05 PROCEDURE — 93005 ELECTROCARDIOGRAM TRACING: CPT | Mod: HCNC

## 2025-03-05 RX ORDER — CEPHALEXIN 500 MG/1
500 CAPSULE ORAL 4 TIMES DAILY
Qty: 20 CAPSULE | Refills: 0 | Status: SHIPPED | OUTPATIENT
Start: 2025-03-05 | End: 2025-03-10

## 2025-03-05 RX ORDER — CEPHALEXIN 500 MG/1
500 CAPSULE ORAL 4 TIMES DAILY
Qty: 20 CAPSULE | Refills: 0 | Status: SHIPPED | OUTPATIENT
Start: 2025-03-05 | End: 2025-03-05

## 2025-03-05 RX ORDER — CEFTRIAXONE 1 G/1
1 INJECTION, POWDER, FOR SOLUTION INTRAMUSCULAR; INTRAVENOUS
Status: COMPLETED | OUTPATIENT
Start: 2025-03-05 | End: 2025-03-05

## 2025-03-05 RX ADMIN — SODIUM CHLORIDE 1000 ML: 9 INJECTION, SOLUTION INTRAVENOUS at 03:03

## 2025-03-05 RX ADMIN — IOHEXOL 75 ML: 350 INJECTION, SOLUTION INTRAVENOUS at 02:03

## 2025-03-05 RX ADMIN — CEFTRIAXONE 1 G: 1 INJECTION, POWDER, FOR SOLUTION INTRAMUSCULAR; INTRAVENOUS at 05:03

## 2025-03-05 NOTE — ED TRIAGE NOTES
Pt came to the ED with his wife reporting altered mental status, weakness and an episode of urinary incontinence. Pt's wife reports pt going to the physical therapy this morning and being weaker than usual, coming home and having some confusion. PT 's wife reports pt had sat on the couch and urinated on himself and that the behavior was unusual for him.

## 2025-03-05 NOTE — FIRST PROVIDER EVALUATION
Medical screening examination initiated.  I have conducted a focused provider triage encounter, findings are as follows:    Brief history of present illness: 10 PM went to bed. 0700 woke up this morning w symptoms. Per wife- shuffling gait difficulty walking.   Confused today. Wife states the pt could not remember the things she had just said to him  Also had episode of urinary incontinence this morning.   Has chronic LUE weakness from previous stroke  There were no vitals filed for this visit.    Pertinent physical exam:  weakness to LUE that is chronic.   Oriented to person and time not to place.   ?neglect- initially states touching R leg when touching the R, L and both but then corrected himself.     Brief workup plan:  stroke code    Preliminary workup initiated; this workup will be continued and followed by the physician or advanced practice provider that is assigned to the patient when roomed.

## 2025-03-05 NOTE — ED PROVIDER NOTES
"Encounter Date: 3/5/2025       History     Chief Complaint   Patient presents with    Altered Mental Status     Pt to the ED with his wife who reports when pt woke up this morning at 7am, he was confused, didn't remember what she was telling him and had a "shuffling" gait. Pt only oriented to self and time, not place. Wife also reports pt had an episode of urinary incontinence this AM which he usually doesn't do. Pt with chronic R upper extremity weakness since previous stroke 2 months ago. Sort provider in triage activating code stroke. Code stroke paged overhead 1323. Pt off the unit to CT 1330.      HPI    74-year-old male past medical history of CAD, CVA, diabetes, hypertension, GERD, hyperlipidemia, obesity, PAD presents with concern for weakness, urinary continence earlier today.  Wife and patient report that around 7:00 a.m. when he woke up he appeared to have a little bit more weak this than normal.  Reports it was concern he may have had some slurred speech as well.  He proceeded to go to physical therapy and was unable to perform in the same therapy tasks he had done a couple of days ago.  He reports still being able to get around, wife reports that he lost control of his bladder on the couch whenever they got home and subsequently brought him to the emergency department for evaluation he has had a urinary tract infection in the last hospitalization he was at, and recently has had no issues urinating, has had no burning with urination or blood in his urine.  He reports currently feeling well, wife at bedside states his speech does not appear to be slurred at this point.  He does have some residual right upper extremity weakness which is what he presented with the last time that he had a very large stroke.  He is currently being compliant with all of his medications including his blood thinning medications.  No further complaints reported.    Review of patient's allergies indicates:   Allergen Reactions    " Penicillins Other (See Comments)     Unknown reaction, Had a reaction as a child    Shrimp Itching     Hand Itching     Past Medical History:   Diagnosis Date    Chronic heart failure with preserved ejection fraction 2/9/2023    Chronic midline low back pain without sciatica 10/2/2017    Colon polyps     Coronary artery disease involving native coronary artery of native heart 3/5/2020    Coronary artery disease involving native coronary artery of native heart with angina pectoris 12/10/2020    CVA (cerebral vascular accident) 1/11/2025    Diabetes mellitus with neurological manifestations, uncontrolled 1/24/2017    Diabetic polyneuropathy associated with type 2 diabetes mellitus 1/24/2017    Diabetic polyneuropathy associated with type 2 diabetes mellitus     Encephalopathy 1/10/2025    Essential hypertension 1/24/2017    Gastroesophageal reflux disease 1/24/2017    Hyperlipidemia 1/24/2017    Insomnia 1/24/2017    Long-term insulin use 1/24/2017    Longstanding persistent atrial fibrillation 12/30/2020    Lumbar spondylosis 11/13/2017    Nuclear sclerosis of both eyes 8/24/2017    Obesity     PAD (peripheral artery disease) 3/23/2022    Stage 3 chronic kidney disease 2/13/2019    Uncontrolled type 2 diabetes mellitus without complication, with long-term current use of insulin 1/24/2017     Past Surgical History:   Procedure Laterality Date    ARTHROSCOPIC REPAIR OF ROTATOR CUFF OF SHOULDER Right 7/5/2022    Procedure: REPAIR, ROTATOR CUFF, ARTHROSCOPIC;  Surgeon: Valerie Olivera MD;  Location: [facility];  Service: Orthopedics;  Laterality: Right;  GUADALUPE AND SAADIA LEMUS [phone]/ TEXTED ALLAN ON 6/23/2022 @ 9:47AM. ALLAN RESPONDED ON 6/23/2022 @ 10:33AM-LO  JEAN PAUL BABIN [phone] MY BE HERE FOR CASE SPOKE TO HIM @ 9:59AM ON 7-1-2022  RN PREOP 6/28/2022    BACK SURGERY      2002    CATARACT EXTRACTION W/  INTRAOCULAR LENS IMPLANT Right 08/29/2017    Dr. Hong    CATARACT EXTRACTION W/  INTRAOCULAR  LENS IMPLANT Left 02/24/2022    Dr. Hong    CAUDAL EPIDURAL STEROID INJECTION N/A 9/25/2024    Procedure: Caudal Epidural Steroid Injection;  Surgeon: Eze Byrd Jr., MD;  Location: [facility] PAIN MANAGEMENT;  Service: Pain Management;  Laterality: N/A;  @1100(prev. 715)  Eliquis last 9/21  Plavix last 9/19  Check BG  E-Consent    COLONOSCOPY N/A 2/21/2017    Procedure: COLONOSCOPY;  Surgeon: Robb Rosado MD;  Location: [facility] ENDO;  Service: Endoscopy;  Laterality: N/A;    COLONOSCOPY N/A 7/5/2023    Procedure: COLONOSCOPY;  Surgeon: Aston Varela MD;  Location: [facility] ENDO;  Service: Endoscopy;  Laterality: N/A;  Referral: JOVANNI SCANLON to hold Plavix 5 days and Eliquis 2 days per Dr Ford  [facility]  Suprep   Inst portal   LW    CORONARY ANGIOGRAPHY INCLUDING BYPASS GRAFTS WITH CATHETERIZATION OF LEFT HEART N/A 11/11/2020    Procedure: ANGIOGRAM, CORONARY, INCLUDING BYPASS GRAFT, WITH LEFT HEART CATHETERIZATION;  Surgeon: Lane Cuellar MD;  Location: [facility] CATH LAB;  Service: Cardiology;  Laterality: N/A;  RN PRE OP 11-5-2020. --COVID NEGATIVE ON  11-. C A    CORONARY ARTERY BYPASS GRAFT      March 2016    EPIDURAL STEROID INJECTION Bilateral 11/14/2018    Procedure: Lumbar Medial Branch Blocks;  Surgeon: Eze Byrd Jr., MD;  Location: [facility];  Service: Pain Management;  Laterality: Bilateral;  Bilateral Lumbar Medial Branch Blocks L2, L3, L4, L5    [id number]  [id number]    Arrive @ 1150; NO Sedation    EPIDURAL STEROID INJECTION Bilateral 11/28/2018    Procedure: Injection, Steroid, Epidural;  Surgeon: Eze Byrd Jr., MD;  Location: [facility];  Service: Pain Management;  Laterality: Bilateral;  Bilateral Sacroiliac Joint Steroid Injections    71462    Arrive @ 0910    EPIDURAL STEROID INJECTION Bilateral 3/20/2019    Procedure: Injection, Steroid, Sacroiliiac Joint;  Surgeon: Eze Byrd Jr., MD;  Location: [facility];  Service: Pain Management;  Laterality:  Bilateral;  Bilateral Sacroiliac Joint Steroid Injections    11333    Arrive @ 1145; Trigger point injections also?    EPIDURAL STEROID INJECTION Right 2023    Procedure: Right L5 Transforaminal Epidural Steroid Injection;  Surgeon: Eze Byrd Jr., MD;  Location: [facility] ENDO;  Service: Pain Management;  Laterality: Right;  @0830 (given)  Eliquis last   Plavix last   Check BG    EPIDURAL STEROID INJECTION Right 10/11/2023    Procedure: Right L3 (infraneural) + S1 Transforaminal Epidural Steroid Injections;  Surgeon: Eze Byrd Jr., MD;  Location: [facility] ENDO;  Service: Pain Management;  Laterality: Right;  @0745 (requests morning)  Eliquis 10/7 & Plavix 10/5  Check BG    ESOPHAGOGASTRODUODENOSCOPY N/A 2023    Procedure: EGD (ESOPHAGOGASTRODUODENOSCOPY);  Surgeon: Anita Oswald MD;  Location: [facility] ENDO;  Service: Endoscopy;  Laterality: N/A;  Procedure Timin-4 weeks  Provider: Any GI provider  Location: No Preference  Additional Scheduling Information: Blood thinners  Prep Specifications:N/A   ref. by Dr. Light, instr. to portal, ,  meds-st  ok to hold Plavix 5 days and Eliquis 2 day    INTRAOCULAR PROSTHESES INSERTION Left 2022    Procedure: INSERTION, IOL PROSTHESIS;  Surgeon: Nickolas Hong MD;  Location: [facility] OR;  Service: Ophthalmology;  Laterality: Left;    LAMINOFORAMINOTOMY OF SPINE USING MINIMALLY INVASIVE TECHNIQUE Right 2025    Procedure: LAMINOFORAMINOTOMY, SPINE, MINIMALLY INVASIVE;  Surgeon: Luis M Ellis MD;  Location: [facility] OR;  Service: Neurosurgery;  Laterality: Right;  Right L4/5 minimally invasive laminotomy/microdiscectomy. silvana kranthi, lucio frame, fluoro, microscope, no vendor, no monitoring  CLEARED BY CARDS AND PCP   RN PREOP 24---T/S--done -----NEED ORDERS    PHACOEMULSIFICATION OF CATARACT Left 2022    Procedure: PHACOEMULSIFICATION, CATARACT;  Surgeon: Nickolas Hong MD;  Location: [facility] OR;   Service: Ophthalmology;  Laterality: Left;  RN Phone Pre Op 2-16-22.  Covid NEGATIVE  2-23-22.  Arrival 08:00 am.    TONSILLECTOMY       Family History   Problem Relation Name Age of Onset    Depression Mother      Heart disease Mother      Stroke Father      No Known Problems Sister Elaine     Diabetes Sister Dilcia     No Known Problems Sister Idalia     Blindness Brother Burt     No Known Problems Maternal Aunt      No Known Problems Maternal Uncle      No Known Problems Paternal Aunt      No Known Problems Paternal Uncle      No Known Problems Maternal Grandmother      No Known Problems Maternal Grandfather      No Known Problems Paternal Grandmother      No Known Problems Paternal Grandfather      Amblyopia Neg Hx      Cancer Neg Hx      Cataracts Neg Hx      Glaucoma Neg Hx      Hypertension Neg Hx      Macular degeneration Neg Hx      Retinal detachment Neg Hx      Strabismus Neg Hx      Thyroid disease Neg Hx       Social History[1]  Review of Systems   Constitutional: Negative.    HENT: Negative.     Eyes: Negative.    Respiratory: Negative.     Cardiovascular: Negative.    Gastrointestinal: Negative.    Genitourinary:         Urinary incontinence   Musculoskeletal: Negative.    Skin: Negative.    Neurological:  Positive for speech difficulty and weakness.       Physical Exam     Initial Vitals [03/05/25 1330]   BP Pulse Resp Temp SpO2   (!) 142/74 78 18 97.8 °F (36.6 °C) 98 %      MAP       --         Physical Exam    Nursing note and vitals reviewed.  Constitutional: He appears well-developed and well-nourished. He is not diaphoretic. No distress.   HENT:   Head: Normocephalic and atraumatic.   Nose: Nose normal.   Eyes: EOM are normal. Pupils are equal, round, and reactive to light.   Neck: Neck supple. No tracheal deviation present. No JVD present.   Normal range of motion.  Cardiovascular:  Normal rate, regular rhythm, normal heart sounds and intact distal pulses.           Pulmonary/Chest: Breath  sounds normal. No respiratory distress. He has no wheezes. He has no rhonchi. He has no rales.   Abdominal: Abdomen is soft. Bowel sounds are normal. He exhibits no distension. There is no abdominal tenderness. There is no rebound and no guarding.   Musculoskeletal:         General: No tenderness or edema. Normal range of motion.      Cervical back: Normal range of motion and neck supple.     Neurological: He is alert and oriented to person, place, and time. No cranial nerve deficit.   Slightly asymmetric  strength with slightly weaker in the right upper extremity.   Skin: Skin is warm and dry. Capillary refill takes less than 2 seconds. No rash noted. No erythema.         ED Course   Critical Care    Date/Time: 3/5/2025 1:55 PM    Performed by: Joe Cobos MD  Authorized by: Joe Cobos MD  Direct patient critical care time: 20 minutes  Additional history critical care time: 10 minutes  Ordering / reviewing critical care time: 5 minutes  Documentation critical care time: 5 minutes  Consulting other physicians critical care time: 8 minutes  Total critical care time (exclusive of procedural time) : 48 minutes  Critical care time was exclusive of separately billable procedures and treating other patients and teaching time.  Critical care was necessary to treat or prevent imminent or life-threatening deterioration of the following conditions: CNS failure or compromise.  Critical care was time spent personally by me on the following activities: development of treatment plan with patient or surrogate, evaluation of patient's response to treatment, examination of patient, obtaining history from patient or surrogate, ordering and performing treatments and interventions, ordering and review of laboratory studies, ordering and review of radiographic studies, re-evaluation of patient's condition, review of old charts and pulse oximetry.        Labs Reviewed   CBC W/ AUTO DIFFERENTIAL - Abnormal        Result Value    WBC 13.95 (*)     RBC 4.65      Hemoglobin 12.7 (*)     Hematocrit 40.0      MCV 86      MCH 27.3      MCHC 31.8 (*)     RDW 17.4 (*)     Platelets 208      MPV 10.2      Immature Granulocytes 0.5      Gran # (ANC) 11.3 (*)     Immature Grans (Abs) 0.07 (*)     Lymph # 1.0      Mono # 1.5 (*)     Eos # 0.0      Baso # 0.04      nRBC 0      Gran % 81.3 (*)     Lymph % 7.2 (*)     Mono % 10.5      Eosinophil % 0.2      Basophil % 0.3      Differential Method Automated     COMPREHENSIVE METABOLIC PANEL - Abnormal    Sodium 138      Potassium 4.6      Chloride 109      CO2 21 (*)     Glucose 112 (*)     BUN 30 (*)     Creatinine 1.2      Calcium 9.4      Total Protein 7.4      Albumin 3.9      Total Bilirubin 1.0      Alkaline Phosphatase 27 (*)     AST 20      ALT 17      eGFR >60      Anion Gap 8     PROTIME-INR - Abnormal    Prothrombin Time 13.4 (*)     INR 1.2     LIPID PANEL - Abnormal    Cholesterol 139      Triglycerides 109      HDL 25 (*)     LDL Cholesterol 92.2      HDL/Cholesterol Ratio 18.0 (*)     Total Cholesterol/HDL Ratio 5.6 (*)     Non-HDL Cholesterol 114     URINALYSIS, REFLEX TO URINE CULTURE - Abnormal    Specimen UA Urine, Clean Catch      Color, UA Yellow      Appearance, UA Clear      pH, UA 8.0      Specific Gravity, UA >1.030 (*)     Protein, UA Negative      Glucose, UA 4+ (*)     Ketones, UA Negative      Bilirubin (UA) Negative      Occult Blood UA Negative      Nitrite, UA Negative      Urobilinogen, UA Negative      Leukocytes, UA Negative      Narrative:     Specimen Source->Urine   URINALYSIS MICROSCOPIC - Abnormal    WBC, UA 9 (*)     Bacteria Occasional      Yeast, UA None      Microscopic Comment SEE COMMENT      Narrative:     Specimen Source->Urine   POCT GLUCOSE, HAND-HELD DEVICE - Abnormal    POC Glucose 114 (*)    POCT GLUCOSE - Abnormal    POCT Glucose 114 (*)    TSH    TSH 2.697            Imaging Results               MRI Brain Without Contrast (Final result)   Result time 03/05/25 20:42:13      Final result by Milo Hay MD (03/05/25 20:42:13)                   Impression:      1. Bilateral posterior parietal subacute cortical infarcts with mild blood products bilaterally.  No significant hematoma is identified.  Follow-up recommended.  2. Moderate bilateral ethmoid sinus disease.  3. This report was flagged in Epic as abnormal.      Electronically signed by: Milo Hay  Date:    03/05/2025  Time:    20:42               Narrative:    EXAMINATION:  MRI BRAIN WITHOUT CONTRAST    CLINICAL HISTORY:  Mental status change, unknown cause;.    TECHNIQUE:  Multiplanar multisequence MR imaging of the brain was performed without contrast.    COMPARISON:  03/05/2025    FINDINGS:  Intracranial compartment:    No midline shift or acute hydrocephalus.  No extra-axial blood or fluid collections.    Diffusion-weighted imaging demonstrates abnormal increased signal in the bilateral posterior parietal lobes suggesting subacute areas of infarction.  Mild edema with increased T2 and FLAIR signal noted.  Mild susceptibility bilaterally also indicating mild blood products.  No significant hematoma is detected.    Minimal increased T1 cortical signal may be associated with early laminar necrosis.    No mass lesion, acute hemorrhage, edema or acute infarct.    Normal vascular flow voids are preserved.    Skull/extracranial contents (limited evaluation): Bone marrow signal intensity is normal.    Moderate bilateral ethmoid sinus disease.                                       CTA Head and Neck (xpd) (Final result)  Result time 03/05/25 15:07:39      Final result by Alfredito Jose III, MD (03/05/25 15:07:39)                   Impression:      There are bilateral posterior hemisphere of all vein subacute infarcts and there is a focus of enhancement involving the right posterior infarct indicating blood-brain barrier breakdown and contrast leak.    50% diameter stenosis of the proximal  ICAs bilaterally    Plaque and narrowing of the vertebral arteries bilaterally at the level of the foramen magnum.  This is probably 50%.    ACAs, MCAs, and PCAs and branches are patent and symmetric.      Electronically signed by: Alfredito Jose MD  Date:    03/05/2025  Time:    15:07               Narrative:    EXAMINATION:  CTA HEAD AND NECK (XPD)    CLINICAL HISTORY:  Transient ischemic attack (TIA);    FINDINGS:  Comparison is CT head dated 03/05/2025.    Patient was administered 75 cc of Omnipaque 350 intravenously.    Again seen are subacute evolving infarcts involving the posterior right and left hemispheres.    Postcontrast images demonstrate some enhancement of the posterior right hemispheric infarct suggesting blood brain barrier breakdown and contrast leak into a subacute evolving infarct.  The aortic arch and great vessels are unremarkable.  The vertebral arteries are grossly patent.  The basilar artery is patent.  There is 50% diameter stenosis of the proximal ICAs.  Skull base vessels are unremarkable.  The ACAs and MCAs are patent and symmetric.  The vertebrobasilar system is patent and symmetric.  The PCAs are patent and symmetric.  No aneurysm, dissection, or thrombus seen.  There is plaque and narrowing of the vertebral arteries at the level of the foramen magnum.    The venous structures are patent.  No soft tissue masses or adenopathy seen.  Thoracic inlet is unremarkable.  The lungs demonstrate nothing unusual.  The bones demonstrate DJD and bilateral areas of neural foraminal narrowing.                                       CT Head Without Contrast (Final result)  Result time 03/05/25 13:50:04      Final result by Alfredito Jose III, MD (03/05/25 13:50:04)                   Impression:      Normal evolution of the previously seen bilateral infarcts.  No new infarcts are seen and no blood products are detected.      Electronically signed by: Alfredito Jose  MD  Date:    03/05/2025  Time:    13:50               Narrative:    EXAMINATION:  CT HEAD WITHOUT CONTRAST    CLINICAL HISTORY:  Neuro deficit, acute, stroke suspected;    FINDINGS:  Comparison is MRI dated 01/12/2025 which showed bilateral posterior acute infarcts prior MRI demonstrated acute infarcts involving the left occipital lobe, right parietal occipital temporal junction, and right subinsular region.  Since the prior study these infarcts show a normal pattern of evolution from acute to subacute.  No new infarcts are seen.  No significant blood products are seen on CT.  No skull lesion or skull fracture seen.  There is minimal sinusitis change.                                       Medications   sodium chloride 0.9% bolus 1,000 mL 1,000 mL (0 mLs Intravenous Stopped 3/5/25 1600)   iohexoL (OMNIPAQUE 350) injection 75 mL (75 mLs Intravenous Given 3/5/25 1452)   cefTRIAXone injection 1 g (1 g Intravenous Given 3/5/25 1717)     Medical Decision Making  Amount and/or Complexity of Data Reviewed  Radiology: ordered.    Risk  Prescription drug management.      MDM:      74-year-old male past medical history of CAD, CVA, diabetes, hypertension, GERD, hyperlipidemia, obesity, PAD presents with concern for weakness, urinary continence earlier today.  Differential Diagnosis includes:  Acute CVA, TIA, encephalopathy, urinary tract infection, intracranial hemorrhage.  Physical exam as noted above.  ED workup notable urinalysis with 4+ sugar, negative ketones, occasional bacteriuria, negative leukocytes CBC white blood cell count 13.95, hemoglobin 12.7, CMP with BUN 30, creatinine 1.2, glucose 1.2, INR 1.2, TSH 2.69, CT head shows normal evolution of the previously seen bilateral infarcts, CTA shows bilateral posterior hemisphere subacute infarcts with a focus of enhancement follow up with the right posterior infarct with contrast slowly, additional findings as noted above.  MRI shows bilateral posterior parietal subacute  infarcts with mild blood products bilaterally without significant hematoma identified.  Patient presentation appears concerning for possible extension of prior stroke versus acute CVA.  Code stroke was activated, vascular Neurology evaluated patient as well as patient is currently outside of the window for thrombolytics in his currently anticoagulated.  Patient does have a bout of urinary incontinence and has had bacteriuria, will treat with Rocephin and Keflex.  Broad his ED course patient requesting to leave multiple different instances.  CTA results and MRI results were discussed with the patient as well as with vascular Neurology recommending appropriately continued medication regimen no additional changes at this point and follow-up with Neurology.  Offered patient observation as well overnight earlier on in his ED stay however he declined.  After further discussion with patient and with family member at bedside patient will be discharged home with outpatient follow-up continued adherence to his medication regimen.  Do not suspect any additional surgical or medical emergency. Discussed diagnosis and further treatment with patient, including f/u.  Return precautions given and all questions answered.  Patient in understanding of plan.  Pt discharged to home improved and stable.        Note was created using voice recognition software. Note may have occasional typographical or grammatical errors, garbled syntax, and other bizarre constructions that may not have been identified and edited despite good tex initial review prior to signing.                                       Clinical Impression:  Final diagnoses:  [R41.82] Altered mental status, unspecified altered mental status type (Primary)  [R82.71] Bacteriuria          ED Disposition Condition    Discharge Stable          ED Prescriptions       Medication Sig Dispense Start Date End Date Auth. Provider    cephALEXin (KEFLEX) 500 MG capsule  (Status:  Discontinued) Take 1 capsule (500 mg total) by mouth 4 (four) times daily. for 5 days 20 capsule 3/5/2025 3/5/2025 Joe Cobos MD    cephALEXin (KEFLEX) 500 MG capsule Take 1 capsule (500 mg total) by mouth 4 (four) times daily. for 5 days 20 capsule 3/5/2025 3/10/2025 Joe Cobos MD          Follow-up Information       Follow up With Specialties Details Why Contact Info    [facility] [place] - Emergency Dept Emergency Medicine Go to  If symptoms worsen [address] [facility] [place] [id number] [phone]    Jono Willoughby MD Internal Medicine Go in 1 week As needed [address] [facility] [address]  [phone]                 Joe Cobos MD  03/06/25 8817         [1]   Social History  Tobacco Use    Smoking status: Never     Passive exposure: Never    Smokeless tobacco: Never   Substance Use Topics    Alcohol use: Not Currently    Drug use: No        Joe Cobos MD  03/06/25 7486

## 2025-03-05 NOTE — TELEPHONE ENCOUNTER
----- Message from Rosa sent at 3/5/2025 10:53 AM CST -----  Type:  Needs Medical Advice/Symptom-based CallWho Called: wifeSymptoms (please be specific): UTI How long has patient had these symptoms:  1 dayWould the patient rather a call back or a response via [facility]: [phone]

## 2025-03-05 NOTE — TELEPHONE ENCOUNTER
I spoke with the patient's spouse, Misa, who stated she is taking the patient to the UC. The patient has UTI symptoms and also has a fever of 102.0.

## 2025-03-06 ENCOUNTER — PATIENT OUTREACH (OUTPATIENT)
Facility: OTHER | Age: 75
End: 2025-03-06
Payer: MEDICARE

## 2025-03-06 ENCOUNTER — TELEPHONE (OUTPATIENT)
Dept: NEUROSURGERY | Facility: CLINIC | Age: 75
End: 2025-03-06
Payer: MEDICARE

## 2025-03-06 DIAGNOSIS — Z98.890 S/P LUMBAR LAMINECTOMY: ICD-10-CM

## 2025-03-06 LAB
OHS QRS DURATION: 92 MS
OHS QTC CALCULATION: 413 MS

## 2025-03-06 RX ORDER — HYDROCODONE BITARTRATE AND ACETAMINOPHEN 10; 325 MG/1; MG/1
1 TABLET ORAL EVERY 6 HOURS PRN
Qty: 16 TABLET | Refills: 0 | Status: SHIPPED | OUTPATIENT
Start: 2025-03-06

## 2025-03-06 NOTE — ED NOTES
Resumed care, received report from Melva. Pt resting in bed, denies any new/concerning symptoms, denies any needs at this time. Family at bedside.

## 2025-03-06 NOTE — TELEPHONE ENCOUNTER
Spoke with patient. Denied a fall. He was hurting so he laid on the ground and then couldn't get back up. Went to the ED and was found to have a UTI.     Short term norco Rx filled. Will schedule for FU in clinic on Monday.

## 2025-03-06 NOTE — TELEPHONE ENCOUNTER
----- Message from Tech Jj sent at 3/6/2025  2:16 PM CST -----  Regarding: FW: Pt called states he need to speak with someone regarding issues he's having with his back  Contact: [phone]  Left rehab 2 days ago and had a bad fall on yesterday and couldn't get up. Went to hosp on yesterday(UTI). Pain at a 10 last 2 days. Asking for something for pain. Please advise.  ----- Message -----  From: Willa Mckee  Sent: 3/6/2025   2:12 PM CST  To: [facility] Staff  Subject: Pt called states he need to speak with someo#    Name of Who is Calling:FREDY ROLLE JR. [[id number]]  What is the request in detail:Pt called states he need to speak with someone regarding issues he's having with his back. Please advise   Can the clinic reply by [facility]:No  What Number to Call Back if not in [facility]: Telephone Information:Mobile          [phone]

## 2025-03-10 ENCOUNTER — OFFICE VISIT (OUTPATIENT)
Dept: NEUROSURGERY | Facility: CLINIC | Age: 75
End: 2025-03-10
Payer: MEDICARE

## 2025-03-10 VITALS
BODY MASS INDEX: 25.78 KG/M2 | OXYGEN SATURATION: 99 % | DIASTOLIC BLOOD PRESSURE: 58 MMHG | WEIGHT: 164.25 LBS | SYSTOLIC BLOOD PRESSURE: 119 MMHG | HEART RATE: 56 BPM | HEIGHT: 67 IN | TEMPERATURE: 99 F

## 2025-03-10 DIAGNOSIS — Z98.890 S/P LUMBAR LAMINECTOMY: Primary | ICD-10-CM

## 2025-03-10 PROCEDURE — 1159F MED LIST DOCD IN RCRD: CPT | Mod: CPTII,S$GLB,, | Performed by: PHYSICIAN ASSISTANT

## 2025-03-10 PROCEDURE — 3074F SYST BP LT 130 MM HG: CPT | Mod: CPTII,S$GLB,, | Performed by: PHYSICIAN ASSISTANT

## 2025-03-10 PROCEDURE — 3044F HG A1C LEVEL LT 7.0%: CPT | Mod: CPTII,S$GLB,, | Performed by: PHYSICIAN ASSISTANT

## 2025-03-10 PROCEDURE — 1160F RVW MEDS BY RX/DR IN RCRD: CPT | Mod: CPTII,S$GLB,, | Performed by: PHYSICIAN ASSISTANT

## 2025-03-10 PROCEDURE — 1101F PT FALLS ASSESS-DOCD LE1/YR: CPT | Mod: CPTII,S$GLB,, | Performed by: PHYSICIAN ASSISTANT

## 2025-03-10 PROCEDURE — 99024 POSTOP FOLLOW-UP VISIT: CPT | Mod: S$GLB,,, | Performed by: PHYSICIAN ASSISTANT

## 2025-03-10 PROCEDURE — 1125F AMNT PAIN NOTED PAIN PRSNT: CPT | Mod: CPTII,S$GLB,, | Performed by: PHYSICIAN ASSISTANT

## 2025-03-10 PROCEDURE — 3288F FALL RISK ASSESSMENT DOCD: CPT | Mod: CPTII,S$GLB,, | Performed by: PHYSICIAN ASSISTANT

## 2025-03-10 PROCEDURE — 3072F LOW RISK FOR RETINOPATHY: CPT | Mod: CPTII,S$GLB,, | Performed by: PHYSICIAN ASSISTANT

## 2025-03-10 PROCEDURE — 3078F DIAST BP <80 MM HG: CPT | Mod: CPTII,S$GLB,, | Performed by: PHYSICIAN ASSISTANT

## 2025-03-10 NOTE — PROGRESS NOTES
[facility]  Neurosurgery     Interval history 03/10/2025:   Patient returns to clinic today for evaluation of acute and severe low back pain.  Last week, the patient had severe back pain, prompting him to lay down on the ground.  The pain was so intense that he was unable to stand up without assistance from his son.  He went to the ER where a stroke was suspected and ruled out.  He was found to have a UTI and started on antibiotics.  Today, he reports that it still hurts when he urinates, but his back pain is improving.  He rates it at 7/10 today, but does not feel the need to take any narcotic pain medication.  He is able to get up and move around freely on his own.  Denies leg pain, numbness, and weakness.      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 Ellis.  Denies fevers, chills, night sweats or N/V. Further denies wound drainage or swelling.     Initial postop visit was delayed due to inpatient rehab where patient was recovering from an ischemic stroke presented on postop day#4.  Stroke symptoms continued to improve.  He has transitioned to outpatient PT and OT.  He still feels he has some residual left-sided weakness.    Regarding the surgery, he remains very happy.  He has full resolution of preop leg pain.  Reports constant low back pain since surgery that is worse in the seated position.  However, he finds this to be very manageable and takes primarily Tylenol for pain control.      Physical Exam:   General: well developed, well nourished, no distress  Neurologic: Alert and oriented. Thought content appropriate.   GCS: Motor: 6/Verbal: 5/Eyes: 4 GCS Total: 15   Mental Status: Awake, Alert, Oriented x3   Cranial nerves: face symmetric, tongue midline, pupils equal, round, reactive to light with accomodation, EOMI.   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 Ellis on 1/6/24.     I suspect the increase in back pains related to increased activities and physical therapy as well as the severe UTI.  Back pain is improving since starting antibiotics.  He continues to participate in PT,OT, and speech therapy related to his recent stroke.  Patient is advised to discuss any increase in back pain with the therapist so that the activities can be modified.    -okay to shower or bathe as normal   -No lifting more than 20 lbs or excessive bending/twisting.   -Can drive when no longer taking narcotics   -Follow up with Dr. Ellis in 4 weeks for 3 month postop evaluation  -Encouraged patient to call if they have any questions or concerns prior to next follow up appt        Bing Antonio PA-C  [facility]  Department of Neurosurgery  [phone]

## 2025-03-12 ENCOUNTER — PATIENT MESSAGE (OUTPATIENT)
Dept: CARDIOLOGY | Facility: CLINIC | Age: 75
End: 2025-03-12
Payer: MEDICARE

## 2025-03-12 ENCOUNTER — TELEPHONE (OUTPATIENT)
Dept: CARDIOLOGY | Facility: CLINIC | Age: 75
End: 2025-03-12
Payer: MEDICARE

## 2025-03-12 NOTE — TELEPHONE ENCOUNTER
----- Message from Eze Purdy MD sent at 3/12/2025  8:40 AM CDT -----  Regarding: Stop plavix  Pls call pt and instruct him to stop Plavix (let him know I spoke to the [facility] neuro team in regards to this).  Also let him know he needs to continue taking the eliquis 5mg bid indefinitely.[facility]  ----- Message -----  From: Ella Ryder MD  Sent: 3/12/2025   8:25 AM CDT  To: Eze Purdy MD  Subject: RE: plavix duration                              Good morning, Per report he was already taking the Eliquis and Plavix, so this was just continues from as his home medication regimen. We discussed that his stroke etiology was likely a combination of a hypoperfusion event initially w/ subsequent cardio embolic R M2 occlusion, so shouldn't be an issue with stopping Plavix from the vascular neuro perspective if he doesn't need an antiplatelet agent from for his cardiac stent placement history. Please let me know if you have any further questions. Sincerely, Dr. Ryder  ----- Message -----  From: Eze Purdy MD  Sent: 2/26/2025   9:44 AM CDT  To: Arjun Franco MD; Ella Ryder MD  Subject: plavix duration                                  I had the pleasure of seeing our mutual patient today.  He is doing well after his recent hospitalization for right MCA CVA.  This appears to have been in the setting withdrawal from his atrial fibrillation thromboprophylaxis.  You also started him on Plavix.  How long you wish him to continue the Plavix?  He is still taking the apixaban 5 mg b.i.d.Also, the discharge summary mentions follow up with vascular Neurology.  Do you want to see him as an outpatient?  If so, please contact the patient to schedule an appointment.Thanks,[facility]

## 2025-03-12 NOTE — TELEPHONE ENCOUNTER
Spoke with patient and his wife and informed them per Dr. Purdy that patient needs to stop Plavix (let him know Dr. Purdy spoke to the [facility] neuro team in regards to this).  Also let him know he needs to continue taking the eliquis 5mg bid indefinitely. They verbalized understanding.

## 2025-03-24 ENCOUNTER — NURSE TRIAGE (OUTPATIENT)
Dept: ADMINISTRATIVE | Facility: CLINIC | Age: 75
End: 2025-03-24
Payer: MEDICARE

## 2025-03-24 ENCOUNTER — HOSPITAL ENCOUNTER (EMERGENCY)
Facility: HOSPITAL | Age: 75
Discharge: HOME | End: 2025-03-24
Attending: EMERGENCY MEDICINE
Payer: MEDICARE

## 2025-03-24 ENCOUNTER — TELEPHONE (OUTPATIENT)
Dept: FAMILY MEDICINE | Facility: CLINIC | Age: 75
End: 2025-03-24
Payer: MEDICARE

## 2025-03-24 VITALS
DIASTOLIC BLOOD PRESSURE: 65 MMHG | SYSTOLIC BLOOD PRESSURE: 150 MMHG | HEART RATE: 52 BPM | WEIGHT: 165 LBS | RESPIRATION RATE: 20 BRPM | TEMPERATURE: 99 F | OXYGEN SATURATION: 99 % | HEIGHT: 67 IN | BODY MASS INDEX: 25.9 KG/M2

## 2025-03-24 DIAGNOSIS — K62.5 RECTAL BLEEDING: Primary | ICD-10-CM

## 2025-03-24 DIAGNOSIS — R10.84 DIFFUSE ABDOMINAL PAIN: ICD-10-CM

## 2025-03-24 LAB
ABSOLUTE EOSINOPHIL (OHS): 0.01 K/UL
ABSOLUTE MONOCYTE (OHS): 0.76 K/UL (ref 0.3–1)
ABSOLUTE NEUTROPHIL COUNT (OHS): 8.74 K/UL (ref 1.8–7.7)
ALBUMIN SERPL BCP-MCNC: 4 G/DL (ref 3.5–5.2)
ALP SERPL-CCNC: 33 UNIT/L (ref 40–150)
ALT SERPL W/O P-5'-P-CCNC: 19 UNIT/L (ref 10–44)
ANION GAP (OHS): 12 MMOL/L (ref 8–16)
APTT PPP: 27.8 SECONDS (ref 21–32)
AST SERPL-CCNC: 26 UNIT/L (ref 11–45)
BASOPHILS # BLD AUTO: 0.03 K/UL
BASOPHILS NFR BLD AUTO: 0.3 %
BILIRUB SERPL-MCNC: 0.9 MG/DL (ref 0.1–1)
BUN SERPL-MCNC: 33 MG/DL (ref 8–23)
CALCIUM SERPL-MCNC: 9.9 MG/DL (ref 8.7–10.5)
CHLORIDE SERPL-SCNC: 108 MMOL/L (ref 95–110)
CO2 SERPL-SCNC: 19 MMOL/L (ref 23–29)
CREAT SERPL-MCNC: 1.2 MG/DL (ref 0.5–1.4)
ERYTHROCYTE [DISTWIDTH] IN BLOOD BY AUTOMATED COUNT: 17.3 % (ref 11.5–14.5)
GFR SERPLBLD CREATININE-BSD FMLA CKD-EPI: >60 ML/MIN/1.73/M2
GLUCOSE SERPL-MCNC: 104 MG/DL (ref 70–110)
HCT VFR BLD AUTO: 39.2 % (ref 40–54)
HGB BLD-MCNC: 12.5 GM/DL (ref 14–18)
IMM GRANULOCYTES # BLD AUTO: 0.05 K/UL (ref 0–0.04)
IMM GRANULOCYTES NFR BLD AUTO: 0.5 % (ref 0–0.5)
INDIRECT COOMBS: NORMAL
INR PPP: 1.2 (ref 0.8–1.2)
LIPASE SERPL-CCNC: 21 U/L (ref 4–60)
LYMPHOCYTES # BLD AUTO: 1.03 K/UL (ref 1–4.8)
MCH RBC QN AUTO: 27 PG (ref 27–50)
MCHC RBC AUTO-ENTMCNC: 31.9 G/DL (ref 32–36)
MCV RBC AUTO: 85 FL (ref 82–98)
NUCLEATED RBC (/100WBC) (OHS): 0 /100 WBC
PLATELET # BLD AUTO: 227 K/UL (ref 150–450)
PMV BLD AUTO: 10.2 FL (ref 9.2–12.9)
POTASSIUM SERPL-SCNC: 4.5 MMOL/L (ref 3.5–5.1)
PROT SERPL-MCNC: 7.9 GM/DL (ref 6–8.4)
PROTHROMBIN TIME: 13.3 SECONDS (ref 9–12.5)
RBC # BLD AUTO: 4.63 M/UL (ref 4.6–6.2)
RELATIVE EOSINOPHIL (OHS): 0.1 %
RELATIVE LYMPHOCYTE (OHS): 9.7 % (ref 18–48)
RELATIVE MONOCYTE (OHS): 7.2 % (ref 4–15)
RELATIVE NEUTROPHIL (OHS): 82.2 % (ref 38–73)
RH BLD: NORMAL
SODIUM SERPL-SCNC: 139 MMOL/L (ref 136–145)
SPECIMEN OUTDATE: NORMAL
WBC # BLD AUTO: 10.62 K/UL (ref 3.9–12.7)

## 2025-03-24 PROCEDURE — 63600175 PHARM REV CODE 636 W HCPCS: Mod: HCNC | Performed by: EMERGENCY MEDICINE

## 2025-03-24 PROCEDURE — 80053 COMPREHEN METABOLIC PANEL: CPT | Mod: HCNC | Performed by: PHYSICIAN ASSISTANT

## 2025-03-24 PROCEDURE — 36415 COLL VENOUS BLD VENIPUNCTURE: CPT | Mod: HCNC | Performed by: PHYSICIAN ASSISTANT

## 2025-03-24 PROCEDURE — 93005 ELECTROCARDIOGRAM TRACING: CPT | Mod: HCNC

## 2025-03-24 PROCEDURE — 96374 THER/PROPH/DIAG INJ IV PUSH: CPT | Mod: HCNC

## 2025-03-24 PROCEDURE — 93010 ELECTROCARDIOGRAM REPORT: CPT | Mod: HCNC,,, | Performed by: INTERNAL MEDICINE

## 2025-03-24 PROCEDURE — 99284 EMERGENCY DEPT VISIT MOD MDM: CPT | Mod: 25,HCNC

## 2025-03-24 PROCEDURE — 85730 THROMBOPLASTIN TIME PARTIAL: CPT | Mod: HCNC | Performed by: PHYSICIAN ASSISTANT

## 2025-03-24 PROCEDURE — 86901 BLOOD TYPING SEROLOGIC RH(D): CPT | Mod: HCNC | Performed by: PHYSICIAN ASSISTANT

## 2025-03-24 PROCEDURE — 85025 COMPLETE CBC W/AUTO DIFF WBC: CPT | Mod: HCNC | Performed by: PHYSICIAN ASSISTANT

## 2025-03-24 PROCEDURE — 83690 ASSAY OF LIPASE: CPT | Mod: HCNC | Performed by: EMERGENCY MEDICINE

## 2025-03-24 PROCEDURE — 96375 TX/PRO/DX INJ NEW DRUG ADDON: CPT | Mod: HCNC

## 2025-03-24 PROCEDURE — 25000003 PHARM REV CODE 250: Mod: HCNC | Performed by: EMERGENCY MEDICINE

## 2025-03-24 PROCEDURE — 85610 PROTHROMBIN TIME: CPT | Mod: HCNC | Performed by: PHYSICIAN ASSISTANT

## 2025-03-24 RX ORDER — FAMOTIDINE 20 MG/1
20 TABLET, FILM COATED ORAL 2 TIMES DAILY
Qty: 20 TABLET | Refills: 0 | Status: SHIPPED | OUTPATIENT
Start: 2025-03-24 | End: 2026-03-24

## 2025-03-24 RX ORDER — FAMOTIDINE 10 MG/ML
40 INJECTION, SOLUTION INTRAVENOUS
Status: COMPLETED | OUTPATIENT
Start: 2025-03-24 | End: 2025-03-24

## 2025-03-24 RX ORDER — ONDANSETRON 4 MG/1
4 TABLET, ORALLY DISINTEGRATING ORAL EVERY 8 HOURS PRN
Qty: 20 TABLET | Refills: 0 | Status: SHIPPED | OUTPATIENT
Start: 2025-03-24

## 2025-03-24 RX ORDER — HYDRALAZINE HYDROCHLORIDE 25 MG/1
50 TABLET, FILM COATED ORAL
Status: COMPLETED | OUTPATIENT
Start: 2025-03-24 | End: 2025-03-24

## 2025-03-24 RX ORDER — LIDOCAINE HYDROCHLORIDE 20 MG/ML
JELLY TOPICAL
Status: COMPLETED | OUTPATIENT
Start: 2025-03-24 | End: 2025-03-24

## 2025-03-24 RX ORDER — ONDANSETRON HYDROCHLORIDE 2 MG/ML
4 INJECTION, SOLUTION INTRAVENOUS
Status: COMPLETED | OUTPATIENT
Start: 2025-03-24 | End: 2025-03-24

## 2025-03-24 RX ORDER — HYDRALAZINE HYDROCHLORIDE 20 MG/ML
10 INJECTION INTRAMUSCULAR; INTRAVENOUS
Status: COMPLETED | OUTPATIENT
Start: 2025-03-24 | End: 2025-03-24

## 2025-03-24 RX ADMIN — FAMOTIDINE 40 MG: 10 INJECTION, SOLUTION INTRAVENOUS at 08:03

## 2025-03-24 RX ADMIN — HYDRALAZINE HYDROCHLORIDE 50 MG: 25 TABLET ORAL at 08:03

## 2025-03-24 RX ADMIN — HYDRALAZINE HYDROCHLORIDE 10 MG: 20 INJECTION INTRAMUSCULAR; INTRAVENOUS at 08:03

## 2025-03-24 RX ADMIN — ONDANSETRON 4 MG: 2 INJECTION INTRAMUSCULAR; INTRAVENOUS at 08:03

## 2025-03-24 RX ADMIN — LIDOCAINE HYDROCHLORIDE 10 ML: 20 JELLY TOPICAL at 08:03

## 2025-03-24 NOTE — TELEPHONE ENCOUNTER
Discussed with staff after her phone conversation with the patient.  Recommendations from triage and from staff all the same in that with his symptoms it is best he be evaluated in the emergency room since he is on anticoagulation and having bleeding from two sources by his report.  At the time of opening this message it looks like he has a arrived at the [facility] room which is appropriate.

## 2025-03-24 NOTE — TELEPHONE ENCOUNTER
"The patient spoke with the Nurse Triage and a message was left by his wife. I contact the patient who stated "he is not going to do that" to anything I said to him. The patient was urinating bleed and passing blood from his rectum. The patient refused to go to the ED or do a urinalysis. He stated he only wants to speak to you.  "

## 2025-03-24 NOTE — TELEPHONE ENCOUNTER
----- Message from Venita sent at 3/24/2025 11:38 AM CDT -----  Regarding: wife  Who Called: wifeSymptoms (please be specific): pt took too much Ozempic Friday and now he is unable to use bathroom. When he did it was nothing but blood. Denied wanting to speak with [facility] and states they want to speak with pcp doctor todayHow long has patient had these symptoms:  Pharmacy:Would the patient rather a call back or a response via [facility] [employer]?Best Call Back Number: [phone] Information:

## 2025-03-24 NOTE — TELEPHONE ENCOUNTER
Pt transferred from scheduling. Rectal bleeding since 0300. States has passed large amount of blood. Also having black/ rust colored stool. States just wants to speak to doctor to find out what to do to stop bleeding. Also complains of rectal pain. Care advice per protocol. Pt declines ED advice stating he does not need ED and does not like going to the ED. States has had negative experiences in the past. Insistent that he speak with his provider. Again advised due to blood loss and reported symptoms, it is strongly advised he go to ED. Pt continues to decline. Requests to speak to his provider. Advised will route high priority to provider but again advised to strongly consider going to ED for evaluation.     Reason for Disposition   SEVERE rectal bleeding (large blood clots; constant or on and off bleeding)    Additional Information   Negative: Passed out (e.g., fainted, lost consciousness, blacked out and was not responding)   Negative: Shock suspected (e.g., cold/pale/clammy skin, too weak to stand, low BP, rapid pulse)   Negative: Vomiting red blood or black (coffee ground) material   Negative: Sounds like a life-threatening emergency to the triager    Protocols used: Rectal Bleeding-A-[place]

## 2025-03-25 ENCOUNTER — PATIENT OUTREACH (OUTPATIENT)
Facility: OTHER | Age: 75
End: 2025-03-25
Payer: MEDICARE

## 2025-03-25 ENCOUNTER — TELEPHONE (OUTPATIENT)
Dept: FAMILY MEDICINE | Facility: CLINIC | Age: 75
End: 2025-03-25
Payer: MEDICARE

## 2025-03-25 DIAGNOSIS — K62.89 RECTAL PAIN: ICD-10-CM

## 2025-03-25 DIAGNOSIS — K62.5 RECTAL BLEEDING: Primary | ICD-10-CM

## 2025-03-25 LAB
OHS QRS DURATION: 92 MS
OHS QTC CALCULATION: 386 MS

## 2025-03-25 NOTE — TELEPHONE ENCOUNTER
The patient stated that the ED told him that he needs to see a Gastro physician. He stated that he was told by the Gastro representative that it could be months before he can be seen. He did stated the representative would try to get him in sooner. The patient was told that a Stat referral is being sent to the Colorectal by Dr. Willoughby. Dr. Willoughby instructed me to tell the patient he needs use Preparation H suppositories along with Lidocaine Ointment. He understood this.

## 2025-03-25 NOTE — TELEPHONE ENCOUNTER
Spoke with medical assistant while she was on the phone.  Patient's sounds like he is having a lot of significant internal rectal pain and constant bleeding.  All occurred apparently after some constipation.  May well have a thrombosed hemorrhoid or tear.  Reviewed ER visit in labs.  Will try and get him a stat referral to Colorectal surgery.  Advise him to use some preparation H or equivalent suppositories and topical lidocaine ointment.  He says he was trying suppositories but it turns out he was probably using Senokot suppositories

## 2025-03-25 NOTE — TELEPHONE ENCOUNTER
Pt went to the ED already last night and I got his message today. He more frustrated today and is still bleeding when he stands up. They gave him medication that seems to help. He's concern that seeing GI will not help because he did it before and they did noyhing according to him.          ----- Message from Sridhar sent at 3/25/2025  2:06 PM CDT -----  Regarding: Ada Wife  Who Called: adaSymptoms (please be specific): rectal bleeding How long has patient had these symptoms:  yesterday morning Pharmacy: .[facility] - [place], [place] - [address] [address]Rjcho: [phone] Fax: [phone] the patient rather a call back or a response via [facility]? Call back  Best Call Back Number: ..[phone] Information: called staff no answer

## 2025-03-25 NOTE — ED PROVIDER NOTES
Encounter Date: 3/24/2025       History     Chief Complaint   Patient presents with    Rectal Bleeding     Pt to ER with reports of rectal bleeding with rectal pain since 3am. Pt denies hx of hemorrhoids. + blood thinners.  Pt reports took ozempic on Saturday      HPI    74-year-old male with a past medical history CVA, diabetes, heart failure, GERD, hypertension, hyperlipidemia presents with concern for rectal bleeding and rectal pain since around 3:00 a.m. this morning.  Patient reports taking Ozempic on Saturday, states he is taking it before however he took a very large dose unknowingly.  He reports shortly thereafter in his subsequent day experiencing some abdominal discomfort, diarrhea, and some burning when he uses the restroom.  Patient has no issues urinating, denies any blood in his urine, hematuria or dysuria.  He reports it hurts worse to sit down currently with his rectal pain.  He is not taking any blood thinning medications.  He has had a colonoscopy in the past.  He has had no history of gastrointestinal bleeding.  He otherwise appears well at this time, is not lightheaded or dizzy.    Review of patient's allergies indicates:   Allergen Reactions    Penicillins Other (See Comments)     Unknown reaction, Had a reaction as a child    Shrimp Itching     Hand Itching     Past Medical History:   Diagnosis Date    Chronic heart failure with preserved ejection fraction 2/9/2023    Chronic midline low back pain without sciatica 10/2/2017    Colon polyps     Coronary artery disease involving native coronary artery of native heart 3/5/2020    Coronary artery disease involving native coronary artery of native heart with angina pectoris 12/10/2020    CVA (cerebral vascular accident) 1/11/2025    Diabetes mellitus with neurological manifestations, uncontrolled 1/24/2017    Diabetic polyneuropathy associated with type 2 diabetes mellitus 1/24/2017    Diabetic polyneuropathy associated with type 2 diabetes mellitus      Encephalopathy 1/10/2025    Essential hypertension 1/24/2017    Gastroesophageal reflux disease 1/24/2017    Hyperlipidemia 1/24/2017    Insomnia 1/24/2017    Long-term insulin use 1/24/2017    Longstanding persistent atrial fibrillation 12/30/2020    Lumbar spondylosis 11/13/2017    Nuclear sclerosis of both eyes 8/24/2017    Obesity     PAD (peripheral artery disease) 3/23/2022    Stage 3 chronic kidney disease 2/13/2019    Uncontrolled type 2 diabetes mellitus without complication, with long-term current use of insulin 1/24/2017     Past Surgical History:   Procedure Laterality Date    ARTHROSCOPIC REPAIR OF ROTATOR CUFF OF SHOULDER Right 7/5/2022    Procedure: REPAIR, ROTATOR CUFF, ARTHROSCOPIC;  Surgeon: Valerie Olivera MD;  Location: [facility] OR;  Service: Orthopedics;  Laterality: Right;  ANAYELI JANEE SAADIA LEMUS [phone]/ TEXTED ALLAN ON 6/23/2022 @ 9:47AM. ALLAN RESPONDED ON 6/23/2022 @ 10:33AM-  JEAN PAUL BABIN [phone] MY BE HERE FOR CASE SPOKE TO HIM @ 9:59AM ON 7-1-2022  RN PREOP 6/28/2022    BACK SURGERY      2002    CATARACT EXTRACTION W/  INTRAOCULAR LENS IMPLANT Right 08/29/2017    Dr. Hong    CATARACT EXTRACTION W/  INTRAOCULAR LENS IMPLANT Left 02/24/2022    Dr. Hong    CAUDAL EPIDURAL STEROID INJECTION N/A 9/25/2024    Procedure: Caudal Epidural Steroid Injection;  Surgeon: Eze Byrd Jr., MD;  Location: [facility] PAIN MANAGEMENT;  Service: Pain Management;  Laterality: N/A;  @1100(prev. 715)  Eliquis last 9/21  Plavix last 9/19  Check BG  E-Consent    COLONOSCOPY N/A 2/21/2017    Procedure: COLONOSCOPY;  Surgeon: Robb Rosado MD;  Location: [facility] ENDO;  Service: Endoscopy;  Laterality: N/A;    COLONOSCOPY N/A 7/5/2023    Procedure: COLONOSCOPY;  Surgeon: Aston Varela MD;  Location: [facility] ENDO;  Service: Endoscopy;  Laterality: N/A;  Referral: JOVANNI SCANLON to hold Plavix 5 days and Eliquis 2 days per Dr Purdy-OFELIA   Meds  Suprep   Inst portal   LW     CORONARY ANGIOGRAPHY INCLUDING BYPASS GRAFTS WITH CATHETERIZATION OF LEFT HEART N/A 11/11/2020    Procedure: ANGIOGRAM, CORONARY, INCLUDING BYPASS GRAFT, WITH LEFT HEART CATHETERIZATION;  Surgeon: Lane Cuellar MD;  Location: [facility] CATH LAB;  Service: Cardiology;  Laterality: N/A;  RN PRE OP 11-5-2020. --COVID NEGATIVE ON  11-. C A    CORONARY ARTERY BYPASS GRAFT      March 2016    EPIDURAL STEROID INJECTION Bilateral 11/14/2018    Procedure: Lumbar Medial Branch Blocks;  Surgeon: Eze Byrd Jr., MD;  Location: [facility] ENDO;  Service: Pain Management;  Laterality: Bilateral;  Bilateral Lumbar Medial Branch Blocks L2, L3, L4, L5    17022  [id number]    Arrive @ 1150; NO Sedation    EPIDURAL STEROID INJECTION Bilateral 11/28/2018    Procedure: Injection, Steroid, Epidural;  Surgeon: Eze Byrd Jr., MD;  Location: [facility];  Service: Pain Management;  Laterality: Bilateral;  Bilateral Sacroiliac Joint Steroid Injections    89310    Arrive @ 0910    EPIDURAL STEROID INJECTION Bilateral 3/20/2019    Procedure: Injection, Steroid, Sacroiliiac Joint;  Surgeon: Eze Byrd Jr., MD;  Location: [facility] ENDO;  Service: Pain Management;  Laterality: Bilateral;  Bilateral Sacroiliac Joint Steroid Injections    99786    Arrive @ 1145; Trigger point injections also?    EPIDURAL STEROID INJECTION Right 8/2/2023    Procedure: Right L5 Transforaminal Epidural Steroid Injection;  Surgeon: Eze Byrd Jr., MD;  Location: [facility];  Service: Pain Management;  Laterality: Right;  @0830 (given)  Eliquis last 7/29  Plavix last 7/27  Check BG    EPIDURAL STEROID INJECTION Right 10/11/2023    Procedure: Right L3 (infraneural) + S1 Transforaminal Epidural Steroid Injections;  Surgeon: Eze Byrd Jr., MD;  Location: [facility] ENDO;  Service: Pain Management;  Laterality: Right;  @0745 (requests morning)  Eliquis 10/7 & Plavix 10/5  Check BG    ESOPHAGOGASTRODUODENOSCOPY N/A 12/6/2023    Procedure: EGD  (ESOPHAGOGASTRODUODENOSCOPY);  Surgeon: Anita Oswald MD;  Location: [facility] ENDO;  Service: Endoscopy;  Laterality: N/A;  Procedure Timin-4 weeks  Provider: Any GI provider  Location: No Preference  Additional Scheduling Information: Blood thinners  Prep Specifications:N/A   ref. by Dr. Light, instr. to portal, , [facility] meds-st  ok to hold Plavix 5 days and Eliquis 2 day    INTRAOCULAR PROSTHESES INSERTION Left 2022    Procedure: INSERTION, IOL PROSTHESIS;  Surgeon: Nickolas Hong MD;  Location: [facility] OR;  Service: Ophthalmology;  Laterality: Left;    LAMINOFORAMINOTOMY OF SPINE USING MINIMALLY INVASIVE TECHNIQUE Right 2025    Procedure: LAMINOFORAMINOTOMY, SPINE, MINIMALLY INVASIVE;  Surgeon: Luis M Ellis MD;  Location: [facility] OR;  Service: Neurosurgery;  Laterality: Right;  Right L4/5 minimally invasive laminotomy/microdiscectomy. silvana table, lucio frame, fluoro, microscope, no vendor, no monitoring  CLEARED BY CARDS AND PCP   RN PREOP 24---T/S--done -----NEED ORDERS    PHACOEMULSIFICATION OF CATARACT Left 2022    Procedure: PHACOEMULSIFICATION, CATARACT;  Surgeon: Nickolas Hong MD;  Location: [facility] OR;  Service: Ophthalmology;  Laterality: Left;  RN Phone Pre Op 22.  Covid NEGATIVE  22.  Arrival 08:00 am.    TONSILLECTOMY       Family History   Problem Relation Name Age of Onset    Depression Mother      Heart disease Mother      Stroke Father      No Known Problems Sister Elaine     Diabetes Sister Dilcia     No Known Problems Sister Idalia     Blindness Brother Burt     No Known Problems Maternal Aunt      No Known Problems Maternal Uncle      No Known Problems Paternal Aunt      No Known Problems Paternal Uncle      No Known Problems Maternal Grandmother      No Known Problems Maternal Grandfather      No Known Problems Paternal Grandmother      No Known Problems Paternal Grandfather      Amblyopia Neg Hx      Cancer Neg Hx      Cataracts  Neg Hx      Glaucoma Neg Hx      Hypertension Neg Hx      Macular degeneration Neg Hx      Retinal detachment Neg Hx      Strabismus Neg Hx      Thyroid disease Neg Hx       Social History[1]  Review of Systems   Constitutional: Negative.    HENT: Negative.     Eyes: Negative.    Respiratory: Negative.     Cardiovascular: Negative.    Gastrointestinal:  Positive for anal bleeding and rectal pain.   Musculoskeletal: Negative.    Skin: Negative.    Neurological: Negative.        Physical Exam     Initial Vitals [03/24/25 1819]   BP Pulse Resp Temp SpO2   (!) 193/74 (!) 55 18 98.7 °F (37.1 °C) 100 %      MAP       --         Physical Exam    Nursing note and vitals reviewed.  Constitutional: He appears well-developed. He is not diaphoretic. He appears distressed (mildly uncomfortable appearing).   HENT:   Head: Normocephalic and atraumatic.   Nose: Nose normal. Mouth/Throat: No oropharyngeal exudate.   Eyes: EOM are normal. Pupils are equal, round, and reactive to light.   Neck: Neck supple. No tracheal deviation present. No JVD present.   Normal range of motion.  Cardiovascular:  Regular rhythm, normal heart sounds and intact distal pulses.           Mild bradycardia   Pulmonary/Chest: Breath sounds normal. No respiratory distress. He has no wheezes. He has no rhonchi. He has no rales.   Abdominal: Abdomen is soft. Bowel sounds are normal. He exhibits no distension. There is no abdominal tenderness. There is no rebound and no guarding.   Genitourinary:    Genitourinary Comments: Good rectal tone, no hemorrhoid noted, no fluctuance or induration present, mild erythema surrounding the rectum, no discharge drainage noted, faintly positive FOBT, no gross blood noted.     Musculoskeletal:         General: No tenderness or edema. Normal range of motion.      Cervical back: Normal range of motion and neck supple.     Neurological: He is alert and oriented to person, place, and time. He has normal strength.   Skin: Skin is  warm and dry. Capillary refill takes less than 2 seconds. No rash noted. No erythema.         ED Course   Procedures  Labs Reviewed   COMPREHENSIVE METABOLIC PANEL - Abnormal       Result Value    Sodium 139      Potassium 4.5      Chloride 108      CO2 19 (*)     Glucose 104      BUN 33 (*)     Creatinine 1.2      Calcium 9.9      Protein Total 7.9      Albumin 4.0      Bilirubin Total 0.9      ALP 33 (*)     AST 26      ALT 19      Anion Gap 12      eGFR >60     PROTIME-INR - Abnormal    PT 13.3 (*)     INR 1.2     CBC WITH DIFFERENTIAL - Abnormal    WBC 10.62      RBC 4.63      HGB 12.5 (*)     HCT 39.2 (*)     MCV 85      MCH 27.0      MCHC 31.9 (*)     RDW 17.3 (*)     Platelet Count 227      MPV 10.2      Nucleated RBC 0      Neut % 82.2 (*)     Lymph % 9.7 (*)     Mono % 7.2      Eos % 0.1      Basophil % 0.3      Imm Grans % 0.5      Neut # 8.74 (*)     Lymph # 1.03      Mono # 0.76      Eos # 0.01      Baso # 0.03      Imm Grans # 0.05 (*)    APTT - Normal    PTT 27.8     LIPASE - Normal    Lipase Level 21     CBC W/ AUTO DIFFERENTIAL    Narrative:     The following orders were created for panel order CBC auto differential.  Procedure                               Abnormality         Status                     ---------                               -----------         ------                     CBC with Differential[[id number]]       Abnormal            Final result                 Please view results for these tests on the individual orders.   TYPE & SCREEN    Specimen Outdate 03/27/2025 23:59      Group & Rh A NEG      Indirect Phong NEG          ECG Results              EKG 12-lead (Final result)        Collection Time Result Time QRS Duration OHS QTC Calculation    03/24/25 20:09:30 03/25/25 12:36:21 92 [id number]                     Final result by Interface, Lab In [facility] (03/25/25 12:36:28)                   Narrative:    Test Reason : K92.2,    Vent. Rate :  53 BPM     Atrial Rate :  46 BPM     P-R Int  :    ms          QRS Dur :  92 ms      QT Int : 412 ms       P-R-T Axes :    109 -37 degrees    QTcB Int : 386 ms    Atrial fibrillation with slow ventricular response  Rightward axis  Nonspecific T wave abnormality  Abnormal ECG  When compared with ECG of 05-Mar-2025 13:51,  Significant changes have occurred  Confirmed by Eze Purdy ([id number]) on 3/25/2025 12:36:14 PM    Referred By: AAAREFERRAL SELF           Confirmed By: Eze Purdy                                  Imaging Results    None          Medications   LIDOcaine HCl 2% urojet (10 mLs Mucous Membrane Given 3/24/25 2036)   hydrALAZINE injection 10 mg (10 mg Intravenous Given 3/24/25 2038)   hydrALAZINE tablet 50 mg (50 mg Oral Given 3/24/25 2038)   ondansetron injection 4 mg (4 mg Intravenous Given 3/24/25 2037)   famotidine (PF) injection 40 mg (40 mg Intravenous Given 3/24/25 2038)     Medical Decision Making  Risk  Prescription drug management.    MDM:    74-year-old male with a past medical history CVA, diabetes, heart failure, GERD, hypertension, hyperlipidemia presents with concern for rectal bleeding and rectal pain since around 3:00 a.m. this morning.   Differential Diagnosis includes:  Medication side-effect, rectal abscess, hemorrhoid, lower GI bleed, symptomatic anemia.  Physical exam as noted above.  ED workup notable for EKG showing AFib with slow ventricular response rate of 53 beats per minute, rightward appearing axis, nonspecific ST and T-wave changes noted,  MS, no STEMI.  Lipase 21, white blood cell count 10.62, hemoglobin 12.5, platelets 227, INR 1.2, CMP with BUN 33, creatinine 1.2, bicarb 19.  Patient presentation appears more consistent with rectal pain secondary to skin irritation with ongoing diarrhea, stool appears brown, faintly positive on FOBT.  Patient observed in the emergency department for greater than 4 hours, no ongoing bowel movement, no melena, no hematochezia noted.  Hemoglobin appears to be baseline, he  otherwise appears well with stable vitals.  Lidocaine jelly applied with complete symptomatic improvement.  Will continue going forward with symptomatic and supportive care with barrier cream, knowing that his symptoms may continue until his Ozempic has fully been metabolized.  Patient is wife at bedside have been in understanding of plan at this time, have communicated to family as well the plan.  All questions were answered and appears stable for discharge home at this time.  Do not suspect any additional surgical or medical emergency. Discussed diagnosis and further treatment with patient and family at bedside, including f/u.  Return precautions given and all questions answered.  Patient and family in understanding of plan.  Pt discharged to home improved and stable.        Note was created using voice recognition software. Note may have occasional typographical or grammatical errors, garbled syntax, and other bizarre constructions that may not have been identified and edited despite good tex initial review prior to signing.                                         Clinical Impression:  Final diagnoses:  [K62.5] Rectal bleeding (Primary)  [R10.84] Diffuse abdominal pain          ED Disposition Condition    Discharge Stable          ED Prescriptions       Medication Sig Dispense Start Date End Date Auth. Provider    ondansetron (ZOFRAN-ODT) 4 MG TbDL Take 1 tablet (4 mg total) by mouth every 8 (eight) hours as needed. 20 tablet 3/24/2025 -- Joe Cobos MD    famotidine (PEPCID) 20 MG tablet Take 1 tablet (20 mg total) by mouth 2 (two) times daily. 20 tablet 3/24/2025 3/24/2026 Joe Cobos MD          Follow-up Information       Follow up With Specialties Details Why Contact [facility] - Emergency Dept Emergency Medicine Go to  If symptoms worsen [address] [facility] [place] [address] [phone]    Jono Willoughby MD Internal  Medicine Go in 1 week As needed [address]  [place] [address]  [phone]      Tatiana Rangel MD Gastroenterology Schedule an appointment as soon as possible for a visit   [address] [facility] [address]  [phone]                   [1]   Social History  Tobacco Use    Smoking status: Never     Passive exposure: Never    Smokeless tobacco: Never   Substance Use Topics    Alcohol use: Not Currently    Drug use: No        Joe Cobos MD  03/27/25 1400

## 2025-03-25 NOTE — TELEPHONE ENCOUNTER
----- Message from Jourdandelin sent at 3/25/2025  3:39 PM CDT -----  Type : Patient Call  Who Called : Patient Wife   Does the patient know what this is regarding?: Requesting a call back in regards to the pt . Pt has more questions for the providers nurse . Please Advise   Would the patient rather a call back or a response via [facility]? Call   Best Call Back Number: [phone]  Additional Information:

## 2025-03-26 ENCOUNTER — HOSPITAL ENCOUNTER (OUTPATIENT)
Dept: RADIOLOGY | Facility: HOSPITAL | Age: 75
Discharge: HOME | End: 2025-03-26
Attending: STUDENT IN AN ORGANIZED HEALTH CARE EDUCATION/TRAINING PROGRAM
Payer: MEDICARE

## 2025-03-26 DIAGNOSIS — K59.03 DRUG-INDUCED CONSTIPATION: ICD-10-CM

## 2025-03-26 PROCEDURE — 74018 RADEX ABDOMEN 1 VIEW: CPT | Mod: TC,HCNC

## 2025-03-26 PROCEDURE — 74018 RADEX ABDOMEN 1 VIEW: CPT | Mod: 26,HCNC,, | Performed by: RADIOLOGY

## 2025-03-26 NOTE — PROGRESS NOTES
Bing Valladares  ED Navigator  Emergency Department    Project: [facility] ED Navigator  Role: Community Health Worker    Date: 03/26/2025  Patient Name: Driss Hernandez Jr.  MRN: [id number]  PCP: Jono Willoughby MD    Assessment:     Driss Hernandez Jr. is a 74 y.o. male who has presented to ED for Rectal bleeding; Diffuse abdominal pain. Patient has visited the ED 2 times in the past 3 months. Patient did contact PCP.     ED Navigator Initial Assessment    ED Navigator Enrollment Documentation  Consent to Services  Does patient consent to completing the assessment?: Yes  Contact  Method of Initial Contact: Phone  Transportation  Insurance Coverage  Do you have coverage/adequate coverage?: Yes  Specialist Appointment  Did the patient come to the ED to see a specialist?: No  Does the patient have a pending specialist referral?: No  Does the patient have a specialist appointment made?: Yes  PCP Follow Up Appointment  Medications  Is patient able to afford medication?: Yes  Psychological  Food  Communication/Education  Other Financial Concerns  Other Social Barriers/Concerns  Primary Barrier  Scheduled Appointment Date: 3/26/25  Plan: Provided information for [facility] 24/7 Nurse triage line, [phone] or [phone] ([phone])         Social History     Socioeconomic History    Marital status:    Tobacco Use    Smoking status: Never     Passive exposure: Never    Smokeless tobacco: Never   Substance and Sexual Activity    Alcohol use: Not Currently    Drug use: No    Sexual activity: Yes     Partners: Female     Social Drivers of Health     Financial Resource Strain: Low Risk  (2/3/2025)    Overall Financial Resource Strain (CARDIA)     Difficulty of Paying Living Expenses: Not hard at all   Food Insecurity: Food Insecurity Present (2/6/2025)    Hunger Vital Sign     Worried About Running Out of Food in the Last Year: Often true     Ran Out of Food in the Last Year: Often true   Transportation  Needs: No Transportation Needs (2/6/2025)    TRANSPORTATION NEEDS     Transportation : No   Physical Activity: Inactive (2/3/2025)    Exercise Vital Sign     Days of Exercise per Week: 0 days     Minutes of Exercise per Session: 0 min   Stress: No Stress Concern Present (2/3/2025)    [employer] - Occupational Stress Questionnaire     Feeling of Stress : Not at all   Housing Stability: Unknown (2/3/2025)    Housing Stability Vital Sign     Unable to Pay for Housing in the Last Year: No     Homeless in the Last Year: No   Recent Concern: Housing Stability - High Risk (1/15/2025)    Received from [facility]    Housing Stability Vital Sign     Unable to Pay for Housing in the Last Year: Yes     Number of Times Moved in the Last Year: 1     Homeless in the Last Year: Yes       Plan:   Pt was seen in the ED on 3/24/25 for rectal bleeding, diffuse abdominal pain. ED Navigator spoke with pt's wife for Post ED visit follow up navigation to assist with scheduling a 7-day Post ED visit follow up appt. Pt's wife states that she had contacted pcp to schedule a 7-day Post ED visit follow up appt and was scheduled on 3/26/24 w/pcp. Pt does not have transportation issues and has no additional needs at this time.  Closing Encounter.    Bing Valladares      Appointment made with: Jono Willoughby MD

## 2025-03-27 ENCOUNTER — TELEPHONE (OUTPATIENT)
Dept: NEUROSURGERY | Facility: CLINIC | Age: 75
End: 2025-03-27
Payer: MEDICARE

## 2025-03-27 NOTE — TELEPHONE ENCOUNTER
Spoke with patient. He woke this AM with severe low back pain. Confirms this is different than his pre-op pain. Denies numbness, weakness, and leg pain. He took a bath yesterday and had trouble getting out of the tub. He had to pull himself out. He wonders if he may have pulled a muscle.     Recently in the hospital with constipation, now with an anal fissure.     Avoid narcotics if at all possible to reduce the risk of worsening constipation   Trial OTC remedies such as lidocaine patches heating pads, ice, Voltaren gel, etc.    All questions answered  Patient understands he may call with any new or worsening symptoms for evaluation in clinic        Bing Antonio PA-C  [facility]  Department of Neurosurgery  [phone]

## 2025-03-27 NOTE — TELEPHONE ENCOUNTER
----- Message from Tech Nekimberly sent at 3/27/2025  2:01 PM CDT -----  Regarding: FW: Consult/Advisory    ----- Message -----  From: Dora Vargas  Sent: 3/27/2025   1:34 PM CDT  To: [facility] Staff  Subject: Consult/Advisory                                   Name Of Caller: Self Contact Preference?:  [phone]  Provider Name:   Paco Does patient feel the need to be seen today? No What is the nature of the call?:  Pt would like to speak with nurse about their back pain. He states it developed after their surgery on Monday 03/24.

## 2025-03-28 ENCOUNTER — TELEPHONE (OUTPATIENT)
Dept: SURGERY | Facility: CLINIC | Age: 75
End: 2025-03-28
Payer: MEDICARE

## 2025-03-28 NOTE — TELEPHONE ENCOUNTER
----- Message from Venessa sent at 3/28/2025 12:06 PM CDT -----  Regarding: appt access  Contact: [phone]  Ada/spouse  calling to ask if she can pick prescription up for ointment. . Pls call [phone]

## 2025-03-31 ENCOUNTER — TELEPHONE (OUTPATIENT)
Dept: FAMILY MEDICINE | Facility: CLINIC | Age: 75
End: 2025-03-31
Payer: MEDICARE

## 2025-03-31 DIAGNOSIS — I63.411 STROKE DUE TO EMBOLISM OF RIGHT MIDDLE CEREBRAL ARTERY: Primary | ICD-10-CM

## 2025-03-31 NOTE — TELEPHONE ENCOUNTER
Referral placed for [facility]    ----- Message from Jovanna Cook sent at 3/31/2025  9:51 AM CDT -----  Regarding: Patient call back  .Type: Patient Call BackWho called:self What is the request in detail:needs clearance from doctor to go back to rehab; states he has been out of rehab due to sickness.Can the clinic reply by [facility]?no Would the patient rather a call back or a response via [facility]? Call Best call back number:.[phone] Information:

## 2025-04-01 ENCOUNTER — TELEPHONE (OUTPATIENT)
Dept: FAMILY MEDICINE | Facility: CLINIC | Age: 75
End: 2025-04-01
Payer: MEDICARE

## 2025-04-01 DIAGNOSIS — I63.411 STROKE DUE TO EMBOLISM OF RIGHT MIDDLE CEREBRAL ARTERY: Primary | ICD-10-CM

## 2025-04-01 NOTE — TELEPHONE ENCOUNTER
----- Message from Med Assistant Baer sent at 4/1/2025  9:11 AM CDT -----  Who called:JONATHAN What is the request in detail:Order faxed to wrong department , please fax to [facility] Can the clinic reply by [facility]? NoWould the patient rather a call back or a response via [facility]? Call [facility] call back number:[phone] Information:Office [phone] dept Thank you.

## 2025-04-02 ENCOUNTER — TELEPHONE (OUTPATIENT)
Dept: FAMILY MEDICINE | Facility: CLINIC | Age: 75
End: 2025-04-02
Payer: MEDICARE

## 2025-04-02 NOTE — TELEPHONE ENCOUNTER
Please advise,    ----- Message from Miguel A sent at 4/2/2025  9:52 AM CDT -----  Regarding: [facility]                                                                      Provider InformationProvider: [facility] Message: stated that they are needing a clearance for the patient to have therapy. Please fax over the clearance to them today. The patient is schedule for therapy on today for 1pm and they have already canceled 2 previous appointments. The fax number is [phone]. The email is [email or web address].Stated that emailing the clearance would be faster. Contact Info:[phone] Info:

## 2025-04-03 ENCOUNTER — TELEPHONE (OUTPATIENT)
Dept: FAMILY MEDICINE | Facility: CLINIC | Age: 75
End: 2025-04-03
Payer: MEDICARE

## 2025-04-03 NOTE — TELEPHONE ENCOUNTER
----- Message from Jono Willoughby MD sent at 4/3/2025  1:45 PM CDT -----  Give condolences and let pt know Dr GLASS did review all the recent events and the appt with the colon doctor about the rectal pain.  ----- Message -----  From: Brayan Fung  Sent: 4/3/2025   1:30 PM CDT  To: Case [facility] Staff    Who called: PtWhat is the request in detail: Pt can't make appt due to son just passed away. He would like a call back.Can the clinic reply by [facility]? No Would the patient rather a call back or a response via [facility]? Call back Best call back number: Telephone Information:mobile [phone] Information: Thank you.

## 2025-04-03 NOTE — TELEPHONE ENCOUNTER
Spoke with patients wife. States that patient is still doing therapy. Patient has an appointment today with provider and will further speak on the matter there.

## 2025-04-03 NOTE — TELEPHONE ENCOUNTER
Spoke with patient's wife. Wife verbalized understanding. Wife appreciates the call and does not have any other concerns at this time.

## 2025-04-04 ENCOUNTER — TELEPHONE (OUTPATIENT)
Dept: FAMILY MEDICINE | Facility: CLINIC | Age: 75
End: 2025-04-04
Payer: MEDICARE

## 2025-04-04 NOTE — TELEPHONE ENCOUNTER
Do not call pt- his son  yesterday I believe    Please tell  Dr GLASS did not initiate the PT or OT and that he thinks all that came after his stroke and orders came from  rehab facility so may want to check the origin of the order -Dr Norris?    At [facility], PT and OT orders are the same order      Ok to VERBALLY give them order to do whatever needed and ok to set up epic order but Dr GLASS will not be able to print it today etc, he is out

## 2025-05-07 ENCOUNTER — TELEPHONE (OUTPATIENT)
Dept: FAMILY MEDICINE | Facility: CLINIC | Age: 75
End: 2025-05-07
Payer: MEDICARE

## 2025-05-07 NOTE — TELEPHONE ENCOUNTER
I left a voice mail reminder for the patient in regards to an upcoming appointment on 05/08/25 with Dr. Willoughby. The patient was notified to arrive 10 minutes early.

## 2025-05-08 ENCOUNTER — OFFICE VISIT (OUTPATIENT)
Dept: FAMILY MEDICINE | Facility: CLINIC | Age: 75
End: 2025-05-08
Payer: MEDICARE

## 2025-05-08 VITALS
HEIGHT: 67 IN | HEART RATE: 62 BPM | SYSTOLIC BLOOD PRESSURE: 164 MMHG | TEMPERATURE: 99 F | DIASTOLIC BLOOD PRESSURE: 74 MMHG | WEIGHT: 153.44 LBS | BODY MASS INDEX: 24.08 KG/M2 | OXYGEN SATURATION: 97 %

## 2025-05-08 DIAGNOSIS — F32.A DEPRESSION, UNSPECIFIED DEPRESSION TYPE: ICD-10-CM

## 2025-05-08 DIAGNOSIS — N18.31 DIABETES MELLITUS DUE TO UNDERLYING CONDITION WITH STAGE 3A CHRONIC KIDNEY DISEASE, WITH LONG-TERM CURRENT USE OF INSULIN: ICD-10-CM

## 2025-05-08 DIAGNOSIS — R41.4 LEFT-SIDED NEGLECT: ICD-10-CM

## 2025-05-08 DIAGNOSIS — E11.65 TYPE 2 DIABETES MELLITUS WITH HYPERGLYCEMIA, WITH LONG-TERM CURRENT USE OF INSULIN: Primary | ICD-10-CM

## 2025-05-08 DIAGNOSIS — E08.22 DIABETES MELLITUS DUE TO UNDERLYING CONDITION WITH STAGE 3A CHRONIC KIDNEY DISEASE, WITH LONG-TERM CURRENT USE OF INSULIN: ICD-10-CM

## 2025-05-08 DIAGNOSIS — Z79.4 TYPE 2 DIABETES MELLITUS WITH HYPERGLYCEMIA, WITH LONG-TERM CURRENT USE OF INSULIN: Primary | ICD-10-CM

## 2025-05-08 DIAGNOSIS — K62.89 RECTAL PAIN: ICD-10-CM

## 2025-05-08 DIAGNOSIS — Z79.4 DIABETES MELLITUS DUE TO UNDERLYING CONDITION WITH STAGE 3A CHRONIC KIDNEY DISEASE, WITH LONG-TERM CURRENT USE OF INSULIN: ICD-10-CM

## 2025-05-08 DIAGNOSIS — I48.11 LONGSTANDING PERSISTENT ATRIAL FIBRILLATION: ICD-10-CM

## 2025-05-08 DIAGNOSIS — Z63.4 BEREAVEMENT: ICD-10-CM

## 2025-05-08 DIAGNOSIS — I10 ESSENTIAL HYPERTENSION: ICD-10-CM

## 2025-05-08 DIAGNOSIS — N18.31 STAGE 3A CHRONIC KIDNEY DISEASE: ICD-10-CM

## 2025-05-08 DIAGNOSIS — K62.5 RECTAL BLEEDING: ICD-10-CM

## 2025-05-08 DIAGNOSIS — I63.411 STROKE DUE TO EMBOLISM OF RIGHT MIDDLE CEREBRAL ARTERY: ICD-10-CM

## 2025-05-08 DIAGNOSIS — I25.10 CORONARY ARTERY DISEASE INVOLVING NATIVE CORONARY ARTERY OF NATIVE HEART WITHOUT ANGINA PECTORIS: ICD-10-CM

## 2025-05-08 PROCEDURE — 99999 PR PBB SHADOW E&M-EST. PATIENT-LVL V: CPT | Mod: PBBFAC,HCNC,, | Performed by: INTERNAL MEDICINE

## 2025-05-08 RX ORDER — SEMAGLUTIDE 0.68 MG/ML
0.25 INJECTION, SOLUTION SUBCUTANEOUS
Qty: 1 EACH | Refills: 6 | Status: SHIPPED | OUTPATIENT
Start: 2025-05-08

## 2025-05-08 RX ORDER — DULOXETIN HYDROCHLORIDE 30 MG/1
30 CAPSULE, DELAYED RELEASE ORAL 2 TIMES DAILY
COMMUNITY
Start: 2025-04-15

## 2025-05-08 SDOH — SOCIAL DETERMINANTS OF HEALTH (SDOH): DISSAPEARANCE AND DEATH OF FAMILY MEMBER: Z63.4

## 2025-05-08 NOTE — PROGRESS NOTES
Chief complaint:  Discussed diabetes and stroke, depression       Physical exam 9/24    74-year-old white male.  Back surgery 1/6, stroke 1/9/25.  Reviewed interval events.  Patient is still doing some outpatient therapy with OT  and speech.  Still a little bit of issues getting words out but his speech is normal and he is comprehending normally.  He is still has a little bit of weak on the left but had some significant improvement.  Reassured that at least we know the cause of his stroke being AFib and being off anticoagulation.  He had a temporal bone MRI done prior to all these events and we reviewed that as well with no tumors as a cause of his prior vertigo and he was reassured about that.          Here today with his daughter who is a nurse practitioner and [place].  We reviewed his med list extensively.  Due to the interval events see has been off of the Amaryl Ozempic and metformin.  He is back on metformin but when taking two at night had diarrhea and okay on 500 b.i.d..  Sugars have been running higher and we looked it is Dexcom and it has been averaging 166 and a 30 day A1c of 7.3.  It was better prior.  Due to the hospitalization he was off the Ozempic and when he got home he started the full 2 mg which cause significant constipation but had not been a problem prior.  He had some impaction.  He was examined in the ER and sustained a tear to his rectum with the acute severe pain.  Reviewed interval events where he did follow up with Colorectal surgery in the injury was seen but getting better and now rectal pain is all resolved.  He did have some spontaneous bright red blood per rectum the other day but no recurrence.    His A1c has been uncontrolled chronically -8.4 in may, 7.7, 8.2, 6.2 , 6.9, 7.4, 6.6, 6.4, 6.2, 6.5 July, 6.3, 6.1 Francois .    Numerous questions regarding other medications to start for diabetes.  Since he did so well on Ozempic without constipation prior we can restart the Ozempic but  start over at 0.25 and titrate upward.  He does have coronary disease and we discussed the FDA approved benefits for his heart and kidneys so it would be a good medication to restart and it was effective.  He would like to lose an additional 10 lb.  He lost some significant weight on Ozempic prior.  We will hold off on adding back the Amaryl to avoid hypoglycemia.  He is on Farxiga as well apparently.  We could switch that to Jardiance her cardiovascular benefits as well but will hold off on that now.  He has also not been on the Tresiba and if we can get diabetes under control without the insulin that would be great so will hold off on that now.      In regards to his depression he has been under significant stress.  His wife left him and moved out in his living with a daughter.  He is hoping to get that relationship repaired.  He knows it was his fault apparently.  He also had a son with a drug problem who was found dead and he found his son we had been dead for awhile.  He was told recently that the workup revealed that it was a fentanyl overdose.  He did have an opiate problem and family had been getting him various forms and treatment over the years.  Patient does have some significant bereavement with all these losses and feels it was his fault his son  and that he feels he could have somehow stopped this from happening.  We discussed he is going through the phases of bereavement and he and daughter are interested in trying to get him to meet with someone for coping skills and grieving since he has had so many losses.  Fully agree.  Daughter will call the psychiatry department and see if there is a counselor or psychologist on the [facility] that he can see.  They know it is a self referral process in the referral on my side was put in.      Currently on Cymbalta 30 mg twice a day since his son .  He has been on it about three weeks total lately.  He does feel depressed frustrated irritable.   Reassured those are typical symptoms.  For now they can take the 60 mg total Cymbalta all at one time in the morning and we need to give it another three or four weeks before we assess response and decide if we want to go to 90/120 mg.  He does have some nerve pain and chronic back pain for which Cymbalta would be a secondary benefit as well so it would be good to try and continue and titrate this upward.    Another issue is his thoughts about driving.  All his family do not want him to drive.  We did openly discuss that it would be safer for someone else without is deficits to drive.  Physically feels he can and I am sure he physically can not but it might be his vision and left neglect.  We did openly discuss that I have known people who have had a vision defect on one side or left neglect and despite all of their safety they still did not see a car coming from that particular side and have been an accidents.  He and family are open and willing to even pay for a physical therapy driving evaluation as so I put in a referral to OT and PT since I am not sure which one does the driving eval and it was not a choice on the current epic order to pick that.  Extensively reviewed both referrals for that.    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 male with a history of HTN, HLD, recurrent CVA on Eliquis (last dose today), IDDM2, CKD3, PAD, obesity on Ozempic, HFpEF, CAD who presents with constipation x1 week and severe anorectal pain following recent anorectal exam. Mr. Hernandez notes that he had stopped taking his previous 1mg dose of Ozempic, but accidentally administered a 2mg dose 5 days ago. Since administration, he developed significant constipation with ability to pass only very small volume stool; when attempting to stool, he began passing a large volume of blood per rectum 3/24/25 and [place] [facility] [place] he reportedly underwent RUDOLPH and subsequently developed severe anorectal  pain following the examination. He was discharged with outpatient follow up, for which he presents today.     Since discharge 3/24, he denies any recurrent rectal bleeding but notes persistent anorectal pain. Pain is sharp, constant, worse with sitting/straining, and improves when lying down.  He denies associated abdominal/pelvic pain or pain radiation. He has applied Preparation H without improvement. He denies BM in the past 7 days but continues passing flatus; has attempted Senokot (1x/day since constipation onset) as well as Miralax twice total.     Of note, he has only been able to pass small amounts of urine today; he denies prior urinary retention or LUTS. He does not have a prior diagnosis of BPH nor take meds; he denies weak stream, dribbling, incomplete emptying or nocturia.   Anal canal and anorectal junction: Upon eversion of the anal mucosa, a posterior tear is visible with reproducible pain to palpation. Following discussion with the patient regarding associated pain, a RUDOLPH was performed to evaluate for fecal impaction. Normal internal anal sphincter tonicitiy. Smooth anal canal and distal rectal mucosa without nodularity or mass but associated tenderness along distal/mid anal canal. Rectal vault with small volume soft stool and no blood. After ruling out distal fecal impaction, the rest of the exam was aborted.   ANOSCOPY: Deferred            History of Present Illness: 74M. Hx HTN, HLD, Afib on Eliuquis, CAD s/p CABG, CKD3, insulin dependent diabetes. Admitted to [facility] overnight 1/9 to 1/10 following a syncopal episode w/fall, as well as encephalopathy. Seen by tele-neurology. Recommended MRI (demonstrated bilateral MCA/PCA watershed infarcts), MRA (demonstrated diminished flow in the distal BL MCA), EEG (posterior slowing), and CTA head/neck (unrevealing on 1/10). Patient exhibited an acute change this morning around 10AM, described as left hemiplegia and dysarthria. Stroke code called,  and repeat CTA demonstrated R M2 occlusion. [place] [facility] [place]     Hospital Course (synopsis of major diagnoses, care, treatment, and services provided during the course of the hospital stay): 1/12/25: taken for endovascular thrombectomy, RM2 recanalized spontaneously, examination improved today with less dense left-side neglect though is still present, Eliquis and Plavix both restarted, stable for step-down to NPU  1/13/25: NAEON. HDS, afebrile, sating well on room air. Repeat MRI revealing progression of damage due to stroke, although clinically pt is improving.   1/14/25: NAEON. HDS, afebrile, sating well on room air. Stable clinical exam. Stepped down today.   1/15/25: Patient to discharge to rehab today. Will continue with eliquis, plavix, and high intensity statin. Plan for follow-up with vascular neuro in 1 month.    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 He is noted to have had a recent spinal surgery on 1/6/25. He presented to [facility] on 1/9 after a fall. Per EMS, patient was at home using the bathroom when he felt light-headed and had a syncopal episode. He was found by a family member on the ground and had been confused since the fall. In ED he was noted to be confused and kept trying to stand up to leave. He was hypertensive and bradycardic to the 40s. Exam with abrasion on forehead. Labs BUN 28 Crt 1.5 eGFR 48 UA with hyaline casts. EKG with bradycardia and AFib with slow ventricular response. CTH without acute intracranial abnormality. CT lumbar spine and no evidence of osseous traumatic injury. He had difficulty ambulating. His urine drug screen was positive for opiates previously prescribed per . He was  placed in observation.     Neurology was consulted for encephalopathy. On 1/10, MRI with bilateral posterior parietal cortical infarcts and MRA with diminished flow and signal and luminal narrowing in the distal distribution bilaterally. CTA was negative for any large vessel occlusion. Suspect  to AMS and confusion. BP medications were stopped to allow permissive HTN. Continue eliquis; he was not a candidate for TNK as last known normal was the day prior. EEG done 1/10 and showed no epileptiform findings, electrographic seizures, or no clinical events recorded. Echo without thrombus.     Neurosurgery was consulted on 1/11 as patient was POD#5 status post right L4/5 MIS decompression. No surgical intervention is indicated. Ok to continue patient on his eliquis and plavix; this is likely the optimal treatment for his strokes but defer to neurology. He is likely far enough out from surgery that this is safe, but be on the lookout for signs of surgical site hematoma (particularly any worsening of right foot function; he has baseline weakness in dorsiflexion).     Patient exam had improved completely but he again developed left hemiplegia and dysarthria with confusion. CT angio head and neck demonstrated right inferior M2 occlusion that was not present on the previous CT angio. Patient was transferred to [facility] on 1/11 and admitted to Neuro CC. NIHSS on arrival of 8 (left-sided inattention/neglect, dysarthria, LUE drift). He was taken for angiogram. DSA demonstrated recanalization of R M2, no intervention performed. He was admitted to [facility] for post-procedure monitoring. Etiology was felt likely cardio embolic in setting of Afib. He was restarted on his anticoagulant medications since on MRI his cortical strokes were not of significant size without any sign of hemorrhagic conversion.     On 1/12, patient was taken for endovascular thrombectomy, RM2 recanalized spontaneously. Bilateral MCA/PCA watershed  territory infarcts, likely due to hypotension in setting on syncope. On 1/13, contacted cardiology who believes the initial elevated PASP on OSH ECHO was inaccurate. They reviewed the ECHO, no significant wall motion abnormalities or right heart strain. Repeat MRI revealing progression of damage due to stroke, although clinically patient is reportedly improving. SLP assessed patient and noted he had aphasia and mild dysphagia. Patient was started on a regular diet with thin liquids.    Prior to admission, the patient was independent for self care and mobility. Upon admission to the inpatient rehab unit, the patient was setup to maximal assistance for self care and moderate to maximal assistance for mobility. Due to a decline in functional status and the need for an interdisciplinary rehab approach, the patient was admitted to [facility] acute inpatient rehabilitation unit.       HOSPITAL COURSE  The patient participated in a comprehensive inpatient rehabilitation program including physical therapy, occupational therapy and rehabilitation nursing. Please refer to UDS for discharge QIM scores    Routine admission blood cultures returned positive for Klebsiella bacteremia secondary to UTI. ID consulted and made recs:   -CTX 2 g q24h  -EOT 1/26/25  -Continue CTX inpatient, but if plans to discharge prior to completion, will step down to levofloxacin 500 mg q24h to complete course.           Counseled Regarding all these issues at length today. Over 70 min  minutes of total time spent on the encounter, which includes face to face time and non-face to face time preparing to see the patient (eg, review of tests), Obtaining and/or reviewing separately obtained history, Documenting clinical information in the electronic or other health record, Independently interpreting results (not separately reported) and communicating results to the patient/family/caregiver, or Care coordination (not separately  reported).  Hospital records reviewed and summarized at great length prior to the appointment    ROS:   CONST: weight stable.  No hypoglycemia, no polyuria, no new neurological deficits.  1 episode of rectal bleeding as above.  No rectal pain    Past medical history:  1.  Uncontrolled diabetes with neuropathy, followed by Dr. Agrawal  2.  Coronary artery disease with triple bypass March 2016, followed by the heart clinic. Now [facility], neg PET 2017, Nuc/echo neg 3/20,  3.  Back surgery 2002.  5.  Hyperlipidemia.  6.  Hypertension.  7.   colonoscopy 3 polyps 2/17 -5 polyps 7/23- 5 yrs  8.  Pneumovax and Prevnar given 2015 according to records  9.  Right leg radiculopathy, confirmed by MRI, did respond epidural with [facility] pain management  10. Partial small-bowel obstruction October 2018  11.  Abnormal TSH on occasion  12. MRI (demonstrated bilateral MCA/PCA watershed infarcts), secondary to off anticoagulation/AFib after right L4/5 MIS decompression    Past Surgical History:   Procedure Laterality Date    ARTHROSCOPIC REPAIR OF ROTATOR CUFF OF SHOULDER Right 7/5/2022    Procedure: REPAIR, ROTATOR CUFF, ARTHROSCOPIC;  Surgeon: Valerie Olivera MD;  Location: [facility] OR;  Service: Orthopedics;  Laterality: Right;  GUADALUPE AND NEPHJENNIFER LEMUS [phone]/ TEXTED ALLAN ON 6/23/2022 @ 9:47AM. ALLAN RESPONDED ON 6/23/2022 @ 10:33AM-  JEAN PAUL BABIN [phone] MY BE HERE FOR CASE SPOKE TO HIM @ 9:59AM ON 7-1-2022  RN PREOP 6/28/2022    BACK SURGERY      2002    CATARACT EXTRACTION W/  INTRAOCULAR LENS IMPLANT Right 08/29/2017    Dr. Hong    CATARACT EXTRACTION W/  INTRAOCULAR LENS IMPLANT Left 02/24/2022    Dr. Hong    CAUDAL EPIDURAL STEROID INJECTION N/A 9/25/2024    Procedure: Caudal Epidural Steroid Injection;  Surgeon: Eze Byrd Jr., MD;  Location: [facility] PAIN MANAGEMENT;  Service: Pain Management;  Laterality: N/A;  @1100(prev. 715)  Eliquis last 9/21  Plavix last 9/19  Check BG  E-Consent     COLONOSCOPY N/A 2/21/2017    Procedure: COLONOSCOPY;  Surgeon: Robb Rosado MD;  Location: [facility] ENDO;  Service: Endoscopy;  Laterality: N/A;    COLONOSCOPY N/A 7/5/2023    Procedure: COLONOSCOPY;  Surgeon: Aston Varela MD;  Location: [facility] ENDO;  Service: Endoscopy;  Laterality: N/A;  Referral: BETHELANDRESPATRICJOVANNI  ok to hold Plavix 5 days and Eliquis 2 days per Dr Purdy-OFELIA   Meds  Suprep   Inst portal   LW    CORONARY ANGIOGRAPHY INCLUDING BYPASS GRAFTS WITH CATHETERIZATION OF LEFT HEART N/A 11/11/2020    Procedure: ANGIOGRAM, CORONARY, INCLUDING BYPASS GRAFT, WITH LEFT HEART CATHETERIZATION;  Surgeon: Lane Cuellar MD;  Location: [facility] CATH LAB;  Service: Cardiology;  Laterality: N/A;  RN PRE OP 11-5-2020. --COVID NEGATIVE ON  11-. C A    CORONARY ARTERY BYPASS GRAFT      March 2016    EPIDURAL STEROID INJECTION Bilateral 11/14/2018    Procedure: Lumbar Medial Branch Blocks;  Surgeon: Eze Byrd Jr., MD;  Location: [facility];  Service: Pain Management;  Laterality: Bilateral;  Bilateral Lumbar Medial Branch Blocks L2, L3, L4, L5    34258  [id number]    Arrive @ 1150; NO Sedation    EPIDURAL STEROID INJECTION Bilateral 11/28/2018    Procedure: Injection, Steroid, Epidural;  Surgeon: Eze Byrd Jr., MD;  Location: [facility];  Service: Pain Management;  Laterality: Bilateral;  Bilateral Sacroiliac Joint Steroid Injections    80696    Arrive @ 0910    EPIDURAL STEROID INJECTION Bilateral 3/20/2019    Procedure: Injection, Steroid, Sacroiliiac Joint;  Surgeon: Eze Byrd Jr., MD;  Location: [facility];  Service: Pain Management;  Laterality: Bilateral;  Bilateral Sacroiliac Joint Steroid Injections    [id number]    Arrive @ 1145; Trigger point injections also?    EPIDURAL STEROID INJECTION Right 8/2/2023    Procedure: Right L5 Transforaminal Epidural Steroid Injection;  Surgeon: Eze Byrd Jr., MD;  Location: [facility];  Service: Pain Management;  Laterality: Right;  @0830  (given)  Eliquis last   Plavix last   Check BG    EPIDURAL STEROID INJECTION Right 10/11/2023    Procedure: Right L3 (infraneural) + S1 Transforaminal Epidural Steroid Injections;  Surgeon: Eze Byrd Jr., MD;  Location: [facility] ENDO;  Service: Pain Management;  Laterality: Right;  @0745 (requests morning)  Eliquis 10/7 & Plavix 10  Check BG    ESOPHAGOGASTRODUODENOSCOPY N/A 2023    Procedure: EGD (ESOPHAGOGASTRODUODENOSCOPY);  Surgeon: Anita Oswald MD;  Location: [facility] ENDO;  Service: Endoscopy;  Laterality: N/A;  Procedure Timin-4 weeks  Provider: Any GI provider  Location: No Preference  Additional Scheduling Information: Blood thinners  Prep Specifications:N/A   ref. by Dr. Light, instr. to portal, , [facility] meds-st  ok to hold Plavix 5 days and Eliquis 2 day    INTRAOCULAR PROSTHESES INSERTION Left 2022    Procedure: INSERTION, IOL PROSTHESIS;  Surgeon: Nickolas Hong MD;  Location: [facility] OR;  Service: Ophthalmology;  Laterality: Left;    LAMINOFORAMINOTOMY OF SPINE USING MINIMALLY INVASIVE TECHNIQUE Right 2025    Procedure: LAMINOFORAMINOTOMY, SPINE, MINIMALLY INVASIVE;  Surgeon: Luis M Ellis MD;  Location: [facility] OR;  Service: Neurosurgery;  Laterality: Right;  Right L4/5 minimally invasive laminotomy/microdiscectomy. silvana table, lucio frame, fluoro, microscope, no vendor, no monitoring  CLEARED BY CARDS AND PCP   RN PREOP 24---T/S--done -----NEED ORDERS    PHACOEMULSIFICATION OF CATARACT Left 2022    Procedure: PHACOEMULSIFICATION, CATARACT;  Surgeon: Nickolas Hong MD;  Location: [facility] OR;  Service: Ophthalmology;  Laterality: Left;  RN Phone Pre Op 22.  Covid NEGATIVE  22.  Arrival 08:00 am.    TONSILLECTOMY           Family history: Father  at 77 with stroke.  Mother  at 72 with heart problems, diabetes, hypertension.  2 sisters living in their 80s and one  at 82.  One brother  at 80 with some  kidney disease and diabetes.  Sisters with diabetes, hypertension and stroke.  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  He drinks alcohol about 3 times.  Never smoked.      Vital signs as above,     Gen: no distress, speech is normal, patient will muscles move symmetrically new line gait is normal.  He can arise without difficulty.      Records and interval clinic notes as above reviewed      Assessment plan:      Diagnoses and all orders for this visit:    Type 2 diabetes mellitus with hyperglycemia, with long-term current use of insulin, uncontrolled due to the issues as above and medication withdrawals as above.  Restart Ozempic, continue metformin and Farxiga, reassess in three-month    Bereavement, long time today discussing depression bereavement and plan of action as above  -     Ambulatory referral/consult to Psychology; Future    Depression, unspecified depression type    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  -     Ambulatory Referral/Consult to Physical Therapy; Future  -     Ambulatory Referral/Consult to Occupational Therapy; Future    Coronary artery disease involving native coronary artery of native heart without angina pectoris, initiate Ozempic again    Left-sided neglect, driving issues discussed    Rectal pain, interval records reviewed and appears rectal injury has healed    Rectal bleeding, continue to monitor bleeding while on anticoagulation    Longstanding persistent atrial fibrillation, rate controlled and on anticoagulation    Stage 3a chronic kidney disease, chronic and stable    Essential hypertension, elevated today and will need to continue to monitor    Diabetes mellitus due to underlying condition with stage 3a chronic kidney disease, with long-term current use of insulin    Other orders  -     semaglutide (OZEMPIC) 0.25 mg or 0.5 mg (2 mg/3 mL) pen  injector; Inject 0.25 mg into the skin every 7 days.     This is a patient with chronic and complex diagnoses whose overall, ongoing care is being managed and monitored by me and our primary care clinic. As such,   is the appropriate add-on code to accompany the other E/M billing for this visit.

## 2025-05-12 ENCOUNTER — OFFICE VISIT (OUTPATIENT)
Dept: NEUROSURGERY | Facility: CLINIC | Age: 75
End: 2025-05-12
Attending: STUDENT IN AN ORGANIZED HEALTH CARE EDUCATION/TRAINING PROGRAM
Payer: MEDICARE

## 2025-05-12 VITALS
DIASTOLIC BLOOD PRESSURE: 78 MMHG | HEART RATE: 60 BPM | WEIGHT: 151.25 LBS | HEIGHT: 67 IN | SYSTOLIC BLOOD PRESSURE: 159 MMHG | BODY MASS INDEX: 23.74 KG/M2 | OXYGEN SATURATION: 96 %

## 2025-05-12 DIAGNOSIS — M54.50 ACUTE LEFT-SIDED LOW BACK PAIN WITHOUT SCIATICA: ICD-10-CM

## 2025-05-12 DIAGNOSIS — M46.1 SACROILIITIS: Primary | ICD-10-CM

## 2025-05-12 DIAGNOSIS — Z98.890 S/P LUMBAR LAMINECTOMY: ICD-10-CM

## 2025-05-12 PROCEDURE — 3072F LOW RISK FOR RETINOPATHY: CPT | Mod: CPTII,S$GLB,, | Performed by: PHYSICIAN ASSISTANT

## 2025-05-12 PROCEDURE — 1101F PT FALLS ASSESS-DOCD LE1/YR: CPT | Mod: CPTII,S$GLB,, | Performed by: PHYSICIAN ASSISTANT

## 2025-05-12 PROCEDURE — 1125F AMNT PAIN NOTED PAIN PRSNT: CPT | Mod: CPTII,S$GLB,, | Performed by: PHYSICIAN ASSISTANT

## 2025-05-12 PROCEDURE — 1159F MED LIST DOCD IN RCRD: CPT | Mod: CPTII,S$GLB,, | Performed by: PHYSICIAN ASSISTANT

## 2025-05-12 PROCEDURE — 99213 OFFICE O/P EST LOW 20 MIN: CPT | Mod: S$GLB,,, | Performed by: PHYSICIAN ASSISTANT

## 2025-05-12 PROCEDURE — 3288F FALL RISK ASSESSMENT DOCD: CPT | Mod: CPTII,S$GLB,, | Performed by: PHYSICIAN ASSISTANT

## 2025-05-12 PROCEDURE — 3078F DIAST BP <80 MM HG: CPT | Mod: CPTII,S$GLB,, | Performed by: PHYSICIAN ASSISTANT

## 2025-05-12 PROCEDURE — 3044F HG A1C LEVEL LT 7.0%: CPT | Mod: CPTII,S$GLB,, | Performed by: PHYSICIAN ASSISTANT

## 2025-05-12 PROCEDURE — 3077F SYST BP >= 140 MM HG: CPT | Mod: CPTII,S$GLB,, | Performed by: PHYSICIAN ASSISTANT

## 2025-05-12 PROCEDURE — 1160F RVW MEDS BY RX/DR IN RCRD: CPT | Mod: CPTII,S$GLB,, | Performed by: PHYSICIAN ASSISTANT

## 2025-05-12 NOTE — Clinical Note
This was Mr. Hernandez's final post-op visit. For a variety of reasons, he never ended up seeing you for a post-op visit. Leg pain remains resolved. New left low back pain seems manageable, and we talked about remedies to make it better.   Offered a visit with you since the others were missed, but he is ok with prn FU.   Thanks,  Inna

## 2025-05-12 NOTE — PROGRESS NOTES
"[facility]     Interval history 5/12/25:  Patient returns to clinic 4 months s/p Right MIS L4-5 laminectomy and foraminotomy with Dr. Ellis. Post-op recovery was complicated by a stroke 4 days after surgery. He has residual cognitive processing deficits. He continues to participate in PT for stroke recovery at [place].     Pre-op leg pain remains fully resolved. Right sided low back pain reported at his last visit has since resolved. He now complains of left sided low back pain that began about one month ago. Pain is worse with prolonged sitting or when sitting on a hard chair. Pain decreases with ambulation. Denies numbness, weakness, or pain in the legs.         Physical Exam:   Vitals:    05/12/25 1432   BP: (!) 159/78   Pulse: 60   SpO2: 96%   Weight: 68.6 kg (151 lb 3.8 oz)   Height: 5' 7" (1.702 m)   PainSc:   8   PainLoc: Back     General: well developed, well nourished, no distress.   Head: normocephalic, atraumatic  Neurologic: Awake, Alert, Oriented x 4. Thought content appropriate.  GCS: Motor: 6/Verbal: 5/Eyes: 4 GCS Total: 15  Language: No aphasia  Speech: No dysarthria  Cranial nerves: face symmetric, tongue midline, CN II-XII grossly intact.   Eyes: pupils equal, round, reactive to light with accomodation, EOMI.  Pulmonary: normal respirations, no signs of respiratory distress    Sensory: intact to light touch throughout BLE    Motor Strength: Moves all extremities spontaneously with good tone.  Full strength lower extremities. No abnormal movements seen.     Strength  Iliopsoas Quadriceps Tibialis  anterior Gastro- cnemius   Lower: R 5/5 5/5 5/5 5/5    L 5/5 5/5 5/5 5/5     Skin: Skin is warm, dry and intact.    Gait: steady   SI Joint tenderness: positive on left   Midline bony tenderness: negative        Assessment/Plan:   Driss Hernandez  is a 74 y.o. male who presents for routine 4 month post-op evaluation, s/p  Right MIS L4-5 laminectomy and foraminotomy with Dr." Caleb on 1/6/24. New complaints of left sided low back pain. Evidence of left sacroiliitis on exam.     Discussed OTC remedies that can be helpful, such as heating pads, lidocaine patches, and voltaren gel.   Recommend PT for low back and SI joint. Referral sent to [facility] where he currently attends therapy for his stroke.   FU prn with neurosurgery for new or worsening sxs.   FU prn with pain mgmt for consideration of injections if pain fails to improve with PT     -Encouraged patient to call if they have any questions or concerns prior to next follow up appt        Bing Antonio PA-C  [facility]  Department of Neurosurgery  [phone]

## 2025-05-13 ENCOUNTER — PATIENT MESSAGE (OUTPATIENT)
Dept: FAMILY MEDICINE | Facility: CLINIC | Age: 75
End: 2025-05-13
Payer: MEDICARE

## 2025-05-14 VITALS — SYSTOLIC BLOOD PRESSURE: 147 MMHG | DIASTOLIC BLOOD PRESSURE: 84 MMHG

## 2025-05-14 RX ORDER — VALSARTAN 80 MG/1
80 TABLET ORAL DAILY
Qty: 90 TABLET | Refills: 3 | Status: SHIPPED | OUTPATIENT
Start: 2025-05-14 | End: 2026-05-14

## 2025-05-16 DIAGNOSIS — E55.9 HYPOVITAMINOSIS D: ICD-10-CM

## 2025-05-19 ENCOUNTER — TELEPHONE (OUTPATIENT)
Dept: FAMILY MEDICINE | Facility: CLINIC | Age: 75
End: 2025-05-19
Payer: MEDICARE

## 2025-05-19 NOTE — TELEPHONE ENCOUNTER
Per Alyssa the Rx Triazolam 0.25 MG Tablets will not be covered for future refills. Please send a new Rx Trazodone HCL or a different Rx. Thank you.

## 2025-05-20 RX ORDER — ERGOCALCIFEROL 1.25 MG/1
50000 CAPSULE ORAL
Qty: 12 CAPSULE | Refills: 12 | Status: SHIPPED | OUTPATIENT
Start: 2025-05-20

## 2025-05-22 DIAGNOSIS — E78.2 MIXED HYPERLIPIDEMIA: ICD-10-CM

## 2025-05-22 RX ORDER — APIXABAN 5 MG/1
5 TABLET, FILM COATED ORAL 2 TIMES DAILY
Qty: 180 TABLET | Refills: 3 | Status: SHIPPED | OUTPATIENT
Start: 2025-05-22

## 2025-05-22 RX ORDER — OMEGA-3-ACID ETHYL ESTERS 1 G/1
CAPSULE, LIQUID FILLED ORAL
Qty: 360 CAPSULE | Refills: 3 | Status: SHIPPED | OUTPATIENT
Start: 2025-05-22

## 2025-05-22 NOTE — TELEPHONE ENCOUNTER
Provider Staff:  Action required for this patient    Requires labs      Please see care gap opportunities below in Care Due Message.    Thanks!  [facility]     Appointments      Date Provider   Last Visit   5/8/2025 Jono Willoughby MD   Next Visit   Visit date not found Jono Willoughby MD     Refill Decision Note   Driss Hernandez  is requesting a refill authorization.  Brief Assessment and Rationale for Refill:  Approve     Medication Therapy Plan:  SIGNED 1/6/25 BUT WAS SET TO PRINT      Comments:     Note composed:8:50 AM 05/22/2025

## 2025-05-22 NOTE — TELEPHONE ENCOUNTER
Care Due:                  Date            Visit Type   Department     Provider  --------------------------------------------------------------------------------                                [facility]                              FOLLOWUP/OF  / INTERNAL MED Jono Samuel  Last Visit: 05-      FICE VISIT   / PEDS         [place]  Next Visit: None Scheduled  None         None Found                                                            Last  Test          Frequency    Reason                     Performed    Due Date  --------------------------------------------------------------------------------    HBA1C.......  6 months...  dapagliflozin, metFORMIN,   01- 07-                             semaglutide..............    Vitamin D...  12 months..  ergocalciferol...........  07- 07-    [facility] Embedded Care Due Messages. Reference number: [id number].   5/22/2025 8:42:58 AM CDT

## 2025-05-23 NOTE — TELEPHONE ENCOUNTER
I spoke with the patient's daughter who stated that the medication is not expensive and they will just pay for it.

## 2025-05-25 DIAGNOSIS — E78.49 OTHER HYPERLIPIDEMIA: ICD-10-CM

## 2025-05-25 NOTE — TELEPHONE ENCOUNTER
No care due was identified.  [facility] Embedded Care Due Messages. Reference number: [id number].   5/25/2025 1:26:40 PM CDT

## 2025-05-26 ENCOUNTER — TELEPHONE (OUTPATIENT)
Dept: FAMILY MEDICINE | Facility: CLINIC | Age: 75
End: 2025-05-26
Payer: MEDICARE

## 2025-05-26 ENCOUNTER — CLINICAL SUPPORT (OUTPATIENT)
Dept: REHABILITATION | Facility: HOSPITAL | Age: 75
End: 2025-05-26

## 2025-05-26 DIAGNOSIS — I63.411 STROKE DUE TO EMBOLISM OF RIGHT MIDDLE CEREBRAL ARTERY: ICD-10-CM

## 2025-05-26 RX ORDER — FENOFIBRATE 160 MG/1
160 TABLET ORAL EVERY MORNING
Qty: 90 TABLET | Refills: 3 | Status: SHIPPED | OUTPATIENT
Start: 2025-05-26

## 2025-05-26 NOTE — PROGRESS NOTES
"Occupational Therapy   Driving Evaluation Clinic Only     Driss Hernandez Jr.   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 Hernandez is currently a licensed  but has been referred to Occupational Therapy by his MD for clinical and on-road testing to be used for driving evaluation. Mr. Hernandez underwent back surgery in January of 2025 and also suffered a CVA a few days later. He was discharged on 1/24/2025 and reports that he is still going to outpatient Speech and Occupational Therapies at [place].     [facility] [phone]   Exp 10/22/2029   Restrictions None    Date last drove: Mr. Hernandez stated that he drove his truck in his driveway and on his block 5 days ago just to make sure it is still running well. He did state that his doctor has told him not to drive.     SUBJECTIVE:    Mr. Hernandez stated that "I have always had a hard time following instructions," and "I have trouble bringing things written together."     OBJECTIVE:   Physical Function Testing:    ROM:  WFLs B UE's    Head / Neck ROM: WFL's   Pt is right hand dominant   Strength: WFL's B UEs  Balance:    Static and Dynamic sitting- Normal   Static Standing - Normal     Dynamic standing - Normal   Transfers and Mobility: Independent as he ambulates at a normal pace and does not need the use of device.   Rapid Pace Walk: 7.5 seconds which is faster and better than the average standard of 8 seconds    Perceptual testing:   Letter cancellation:  33/34 a passing score but testing time was greatly increased and was completed in 3 minutes 4 seconds, average time for completion is 1 minute 15 seconds   Line bisection:  WNL's  Maze Test - 66 seconds, 1 error(s)  (Cut point score is 60 seconds or less with 1 error or less)   Trail Making Test A - 164 seconds, no error(s) (Average 29 seconds, Deficient > 78 seconds, rule of thumb: most in 90 " seconds)  Trail Making Test B - Unable to complete after 4 minutes with multiple instructions given and multiple errors. (Average 75 seconds, Deficient > 273 seconds, rule of thumb: most in 3 minutes)  Motor Free Visual Perception Test (MVPT), which assesses figure ground, visual discrimination, spatial relationships, visual closure and visual memory, 26/36, a non-passing score, where testing was competed in 20 minutes 4 seconds, average time for completion is 7 minutes. Mr. Hernandez needed the instructions to be repeated 12 times throughout the testing. Normally, instructions are given once for each section and repeating the instructions are not needed.   Right/Left discrimination: 4/4   Auditory discrimination: WFL     Vision testing: no glasses were worn during testing  Peripheral vision: bilateral nasal vision is intact. Left peripheral vision is intact at 85°, Right is intact at 85°.  Acuity  Acuity Left eye 20/40  Acuity Right eye 20/30  Acuity both eyes 20/30  Color vision: 4/5   Lateral phoria which assesses binocular vision was Ideal (Orthophoric).  Correctly identified only 8/12 road signs and was 0/3 for depth perception.  Comment: Visual acuity minimum standards for the [facility] is 20/40 in one eye.    Cognitive testing:   Short term memory:  2/4, below average score   Immediate # recall (Digit span) : 8 , a WNL's  score    Cognitive sequencing of months of year in reverse:  Unable to perform task even with cues and encouragement  Long term memory:  Mild impairment for dates as he could not remember the year he was  and was delayed answering the year he graduated high school     [facility] Mental Status Exam (UMS) which is used to  detect mild neurocognitive disorder and dementia: Pt scored 12/30 which  scores in the range indicating dementia.    Judgment questions regarding rules of the road: 6/8 correct    Insight:  Poor, as when results were discussed and recommendations  given to continue therapy and not drive at this time. Mr. Hernandez became very angry and stated that he was going to drive anyway and find a new doctor that will allow him to drive.     Communication:   Expressive aphasia:  Not found  Receptive aphasia:  Not found    Reaction time testin/100s or a second (avg of 3 trials) which is near the average standard of 50/100s of a second.    In-car / on-road assessment:  On road testing not performed as Mr. Hernandez has too many deficits in clinical testing to be able safely operate an automobile.     ASSESSMENT:   Mary Cash had poor scores in multiple areas of testing in the clinic. The lower scores in perception skills, cognitive skills, road sign recognition, depth perception and overall increased time for processing in all areas of testing show that Mr. Hernandez does not have the skills to safely operate a motor vehicle. It was discussed that since he is still in therapies he may still be recovering and was encouraged to continue his Speech and Occupational Therapies and if his MD feels that he has improved he could be retested in about 4-6 months. Mr. Hernandez was not happy with this recommendation and stated that he feels that he has the skills to drive and will drive anyway since he has a current 's license. The session is not covered by insurance and Mr. Hernandez also said that he does not intend to pay and will not pay the bill if it is mailed to him.   There were no family members present to discuss the recommendations and Mr. Hernandez did not want to contact anyone. He did understand that this report would be notified to the referring physician.   Mr. Hernandez stated that he has no one to provide transportation services for him, but a friend did bring him to the session today. He did not want to discuss community transportation options and left the session very angry.  Findings were notified to the office of Jono Willoughby MD.      PLAN:   Discharge patient from outpatient Occupational Therapy services as driving evaluation is completed.     YAEL Salazar, ELENI  Occupational Therapist, Certified  Rehabilitation Specialist  5/26/2025

## 2025-05-26 NOTE — TELEPHONE ENCOUNTER
----- Message from CAPRICE Melgoza sent at 5/26/2025  4:17 PM CDT -----  Regarding: Driving evaluation  Alondra Willoughby,Mr. Driss Hernandez completed only the clinic part of a driving evaluation with me today and did not do well having deficits in many areas. I recommend that he not drive at this time. My full report is in his chart. Thanks for the referral, YAEL Salazar

## 2025-05-27 ENCOUNTER — TELEPHONE (OUTPATIENT)
Dept: FAMILY MEDICINE | Facility: CLINIC | Age: 75
End: 2025-05-27
Payer: MEDICARE

## 2025-05-27 ENCOUNTER — HOSPITAL ENCOUNTER (OUTPATIENT)
Dept: CARDIOLOGY | Facility: HOSPITAL | Age: 75
Discharge: HOME | End: 2025-05-27
Attending: INTERNAL MEDICINE
Payer: MEDICARE

## 2025-05-27 ENCOUNTER — RESULTS FOLLOW-UP (OUTPATIENT)
Dept: CARDIOLOGY | Facility: HOSPITAL | Age: 75
End: 2025-05-27

## 2025-05-27 DIAGNOSIS — I65.23 ATHEROSCLEROSIS OF BOTH CAROTID ARTERIES: ICD-10-CM

## 2025-05-27 LAB
LEFT CBA DIAS: 9 CM/S
LEFT CBA SYS: 106 CM/S
LEFT CCA DIST DIAS: 9 CM/S
LEFT CCA DIST SYS: 70 CM/S
LEFT CCA MID DIAS: 9 CM/S
LEFT CCA MID SYS: 70 CM/S
LEFT CCA PROX DIAS: 7 CM/S
LEFT CCA PROX SYS: 64 CM/S
LEFT ECA DIAS: 7 CM/S
LEFT ECA SYS: 210 CM/S
LEFT ICA DIST DIAS: 15 CM/S
LEFT ICA DIST SYS: 74 CM/S
LEFT ICA MID DIAS: 25 CM/S
LEFT ICA MID SYS: 180 CM/S
LEFT ICA PROX DIAS: 51 CM/S
LEFT ICA PROX SYS: 240 CM/S
LEFT VERTEBRAL DIAS: 15 CM/S
LEFT VERTEBRAL SYS: 85 CM/S
OHS CV CAROTID RIGHT ICA EDV HIGHEST: 33
OHS CV CAROTID ULTRASOUND LEFT ICA/CCA RATIO: 3.43
OHS CV CAROTID ULTRASOUND RIGHT ICA/CCA RATIO: 1.64
OHS CV PV CAROTID LEFT HIGHEST CCA: 70
OHS CV PV CAROTID LEFT HIGHEST ICA: 240
OHS CV PV CAROTID RIGHT HIGHEST CCA: 94
OHS CV PV CAROTID RIGHT HIGHEST ICA: 154
OHS CV US CAROTID LEFT HIGHEST EDV: 51
RIGHT CBA DIAS: 18 CM/S
RIGHT CBA SYS: 109 CM/S
RIGHT CCA DIST DIAS: 14 CM/S
RIGHT CCA DIST SYS: 94 CM/S
RIGHT CCA MID DIAS: 7 CM/S
RIGHT CCA MID SYS: 67 CM/S
RIGHT CCA PROX DIAS: 7 CM/S
RIGHT CCA PROX SYS: 79 CM/S
RIGHT ECA DIAS: 0 CM/S
RIGHT ECA SYS: 265 CM/S
RIGHT ICA DIST DIAS: 22 CM/S
RIGHT ICA DIST SYS: 91 CM/S
RIGHT ICA MID DIAS: 25 CM/S
RIGHT ICA MID SYS: 154 CM/S
RIGHT ICA PROX DIAS: 33 CM/S
RIGHT ICA PROX SYS: 154 CM/S
RIGHT VERTEBRAL DIAS: 13 CM/S
RIGHT VERTEBRAL SYS: 66 CM/S

## 2025-05-27 PROCEDURE — 93880 EXTRACRANIAL BILAT STUDY: CPT | Mod: 26,,, | Performed by: INTERNAL MEDICINE

## 2025-05-27 PROCEDURE — 93880 EXTRACRANIAL BILAT STUDY: CPT

## 2025-05-27 NOTE — TELEPHONE ENCOUNTER
----- Message from Med Assistant Yanci sent at 5/27/2025  2:25 PM CDT -----  Name of Who is Calling: Ada wife of SARIAH, FREDY BARRERA JR. [[id number]]  What is the request in detail: Pt is requesting a call back to discuss the driving test he did yesterday. Please assist.  Can the clinic reply by [facility]: No  What Number to Call Back if not in [facility]: [phone]

## 2025-06-09 ENCOUNTER — PATIENT MESSAGE (OUTPATIENT)
Dept: ADMINISTRATIVE | Facility: HOSPITAL | Age: 75
End: 2025-06-09
Payer: MEDICARE

## 2025-06-23 ENCOUNTER — OFFICE VISIT (OUTPATIENT)
Dept: FAMILY MEDICINE | Facility: CLINIC | Age: 75
End: 2025-06-23
Payer: MEDICARE

## 2025-06-23 ENCOUNTER — PATIENT MESSAGE (OUTPATIENT)
Dept: FAMILY MEDICINE | Facility: CLINIC | Age: 75
End: 2025-06-23

## 2025-06-23 VITALS
TEMPERATURE: 98 F | HEART RATE: 68 BPM | SYSTOLIC BLOOD PRESSURE: 126 MMHG | HEIGHT: 67 IN | BODY MASS INDEX: 23.53 KG/M2 | DIASTOLIC BLOOD PRESSURE: 64 MMHG | OXYGEN SATURATION: 96 % | WEIGHT: 149.94 LBS

## 2025-06-23 DIAGNOSIS — I10 ESSENTIAL HYPERTENSION: ICD-10-CM

## 2025-06-23 DIAGNOSIS — E11.65 TYPE 2 DIABETES MELLITUS WITH HYPERGLYCEMIA, WITH LONG-TERM CURRENT USE OF INSULIN: ICD-10-CM

## 2025-06-23 DIAGNOSIS — I63.411 STROKE DUE TO EMBOLISM OF RIGHT MIDDLE CEREBRAL ARTERY: Primary | ICD-10-CM

## 2025-06-23 DIAGNOSIS — M54.16 LUMBAR RADICULOPATHY: ICD-10-CM

## 2025-06-23 DIAGNOSIS — M96.1 POSTLAMINECTOMY SYNDROME OF LUMBAR REGION: ICD-10-CM

## 2025-06-23 DIAGNOSIS — Z79.4 TYPE 2 DIABETES MELLITUS WITH HYPERGLYCEMIA, WITH LONG-TERM CURRENT USE OF INSULIN: ICD-10-CM

## 2025-06-23 PROCEDURE — 99999 PR PBB SHADOW E&M-EST. PATIENT-LVL IV: CPT | Mod: PBBFAC,HCNC,, | Performed by: INTERNAL MEDICINE

## 2025-06-23 RX ORDER — GABAPENTIN 300 MG/1
900 CAPSULE ORAL NIGHTLY
Qty: 270 CAPSULE | Refills: 5 | Status: SHIPPED | OUTPATIENT
Start: 2025-06-23

## 2025-06-23 RX ORDER — CLOPIDOGREL BISULFATE 75 MG/1
75 TABLET ORAL
COMMUNITY
Start: 2025-05-27

## 2025-06-23 NOTE — PROGRESS NOTES
Chief complaint:  Follow-up on diabetes, stroke, therapy       Physical exam 9/24    74-year-old white male.  Back surgery 1/6, stroke 1/9/25.  Reviewed interval events.  Patient is still doing some outpatient therapy with OT  and speech.  Still a little bit of issues getting words out but his speech is normal and he is comprehending normally.  He is still has a little bit of weak on the left but had some significant improvement.  Reassured that at least we know the cause of his stroke being AFib and being off anticoagulation.  He had a temporal bone MRI done prior to all these events and we reviewed that as well with no tumors as a cause of his prior vertigo and he was reassured about that.      Patient did go for a driving evaluation but felt that the therapist started the conversation and evaluation very rude and so patient new he was not going to be passed.  It does not sound like it was a pleasant experience.  He is still driving but only around the block to therapy.  He only drives where his family will say it is okay and he is fine with that.  He does not perceive any driving difficulties.  He is open to repeating a driving evaluation but at a different facility and we did look up on the Internet and it does appear that there are available resources for that.  1 of his current therapist worked at [facility] neuro rehabilitation and they probably offer that service.    He does need a refill of gabapentin and he takes two or three at night only so I adjusted his prescription to reflect his current dosing.        He was here previously with his daughter who is a nurse practitioner and [place].  We previously reviewed his med list.  Due to the interval events see has been off of the Amaryl Ozempic and metformin.  He is back on metformin but when taking two at night had diarrhea and okay on 500 b.i.d..  Sugars have been running higher and we looked it is Dexcom and it has been averaging 166 and a 30 day A1c of 7.3.  It  was better prior.  Due to the hospitalization he was off the Ozempic and when he got home he started the full 2 mg which cause significant constipation but had not been a problem prior.  He had some impaction.  He was examined in the ER and sustained a tear to his rectum with the acute severe pain.  Reviewed interval events where he did follow up with Colorectal surgery in the injury was seen but getting better and now rectal pain is all resolved.  He did have some spontaneous bright red blood per rectum the other day but no recurrence.    His A1c has been uncontrolled chronically -8.4 in may, 7.7, 8.2, 6.2 , 6.9, 7.4, 6.6, 6.4, 6.2, 6.5 July, 6.3, 6.1 Jan .    Numerous questions regarding other medications to start for diabetes.  Since he did so well on Ozempic without constipation prior we can restart the Ozempic but start over at 0.25 and titrate upward.  He does have coronary disease and we discussed the FDA approved benefits for his heart and kidneys so it would be a good medication to restart and it was effective.  He would like to lose an additional 10 lb.  He lost some significant weight on Ozempic prior.  We will hold off on adding back the Amaryl to avoid hypoglycemia.  He is on Farxiga as well apparently.  We could switch that to Jardiance her cardiovascular benefits as well but will hold off on that now.  He has also not been on the Tresiba and if we can get diabetes under control without the insulin that would be great so will hold off on that now.      In regards to his depression he has been under significant stress.  His wife left him and moved out in his living with a daughter.  He is hoping to get that relationship repaired.  He knows it was his fault apparently.  He also had a son with a drug problem who was found dead and he found his son we had been dead for awhile.  He was told recently that the workup revealed that it was a fentanyl overdose.  He did have an opiate problem and family had been  getting him various forms and treatment over the years.  Patient does have some significant bereavement with all these losses and feels it was his fault his son  and that he feels he could have somehow stopped this from happening.  We discussed he is going through the phases of bereavement and he and daughter are interested in trying to get him to meet with someone for coping skills and grieving since he has had so many losses.  Fully agree.  Daughter will call the psychiatry department and see if there is a counselor or psychologist on the [facility] preferable that he can see.  They know it is a self referral process in the referral on my side was put in.      Currently on Cymbalta 30 mg twice a day since his son .  He has been on it about three weeks total lately.  He does feel depressed frustrated irritable.  Reassured those are typical symptoms.  For now they can take the 60 mg total Cymbalta all at one time in the morning and we need to give it another three or four weeks before we assess response and decide if we want to go to 90/120 mg.  He does have some nerve pain and chronic back pain for which Cymbalta would be a secondary benefit as well so it would be good to try and continue and titrate this upward.      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 male with a history of HTN, HLD, recurrent CVA on Eliquis (last dose today), IDDM2, CKD3, PAD, obesity on Ozempic, HFpEF, CAD who presents with constipation x1 week and severe anorectal pain following recent anorectal exam. Mr. Hernandez notes that he had stopped taking his previous 1mg dose of Ozempic, but accidentally administered a 2mg dose 5 days ago. Since administration, he developed significant constipation with ability to pass only very small volume stool; when attempting to stool, he began passing a large volume of blood per rectum 3/24/25 and underwent evaluation in the [facility] ED; he reportedly underwent RUDOLPH and subsequently  developed severe anorectal pain following the examination. He was discharged with outpatient follow up, for which he presents today.     Since discharge 3/24, he denies any recurrent rectal bleeding but notes persistent anorectal pain. Pain is sharp, constant, worse with sitting/straining, and improves when lying down.  He denies associated abdominal/pelvic pain or pain radiation. He has applied Preparation H without improvement. He denies BM in the past 7 days but continues passing flatus; has attempted Senokot (1x/day since constipation onset) as well as Miralax twice total.     Of note, he has only been able to pass small amounts of urine today; he denies prior urinary retention or LUTS. He does not have a prior diagnosis of BPH nor take meds; he denies weak stream, dribbling, incomplete emptying or nocturia.   Anal canal and anorectal junction: Upon eversion of the anal mucosa, a posterior tear is visible with reproducible pain to palpation. Following discussion with the patient regarding associated pain, a RUDOLPH was performed to evaluate for fecal impaction. Normal internal anal sphincter tonicitiy. Smooth anal canal and distal rectal mucosa without nodularity or mass but associated tenderness along distal/mid anal canal. Rectal vault with small volume soft stool and no blood. After ruling out distal fecal impaction, the rest of the exam was aborted.   ANOSCOPY: Deferred            History of Present Illness: 74M. Hx HTN, HLD, Afib on Eliuquis, CAD s/p CABG, CKD3, insulin dependent diabetes. Admitted to [facility] overnight 1/9 to 1/10 following a syncopal episode w/fall, as well as encephalopathy. Seen by tele-neurology. Recommended MRI (demonstrated bilateral MCA/PCA watershed infarcts), MRA (demonstrated diminished flow in the distal BL MCA), EEG (posterior slowing), and CTA head/neck (unrevealing on 1/10). Patient exhibited an acute change this morning around 10AM, described as left hemiplegia and  dysarthria. Stroke code called, and repeat CTA demonstrated R M2 occlusion. Patient transferred to [facility] for neuro-IR capacity.     Hospital Course (synopsis of major diagnoses, care, treatment, and services provided during the course of the hospital stay): 1/12/25: taken for endovascular thrombectomy, RM2 recanalized spontaneously, examination improved today with less dense left-side neglect though is still present, Eliquis and Plavix both restarted, stable for step-down to NPU  1/13/25: NAEON. HDS, afebrile, sating well on room air. Repeat MRI revealing progression of damage due to stroke, although clinically pt is improving.   1/14/25: NAEON. HDS, afebrile, sating well on room air. Stable clinical exam. Stepped down today.   1/15/25: Patient to discharge to rehab today. Will continue with eliquis, plavix, and high intensity statin. Plan for follow-up with vascular neuro in 1 month.    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 He is noted to have had a recent spinal surgery on 1/6/25. He presented to [facility] on 1/9 after a fall. Per EMS, patient was at home using the bathroom when he felt light-headed and had a syncopal episode. He was found by a family member on the ground and had been confused since the fall. In ED he was noted to be confused and kept trying to stand up to leave. He was hypertensive and bradycardic to the 40s. Exam with abrasion on forehead. Labs BUN 28 Crt 1.5 eGFR 48 UA with hyaline casts. EKG with bradycardia and AFib with slow ventricular response. CTH without acute intracranial abnormality. CT lumbar spine and no evidence of osseous traumatic injury. He had difficulty ambulating. His urine drug screen was positive for opiates  previously prescribed per . He was placed in observation.     Neurology was consulted for encephalopathy. On 1/10, MRI with bilateral posterior parietal cortical infarcts and MRA with diminished flow and signal and luminal narrowing in the distal distribution bilaterally. CTA was negative for any large vessel occlusion. Suspect  to AMS and confusion. BP medications were stopped to allow permissive HTN. Continue eliquis; he was not a candidate for TNK as last known normal was the day prior. EEG done 1/10 and showed no epileptiform findings, electrographic seizures, or no clinical events recorded. Echo without thrombus.     Neurosurgery was consulted on 1/11 as patient was POD#5 status post right L4/5 MIS decompression. No surgical intervention is indicated. Ok to continue patient on his eliquis and plavix; this is likely the optimal treatment for his strokes but defer to neurology. He is likely far enough out from surgery that this is safe, but be on the lookout for signs of surgical site hematoma (particularly any worsening of right foot function; he has baseline weakness in dorsiflexion).     Patient exam had improved completely but he again developed left hemiplegia and dysarthria with confusion. CT angio head and neck demonstrated right inferior M2 occlusion that was not present on the previous CT angio. Patient was transferred to [facility] on 1/11 and admitted to Neuro CC. NIHSS on arrival of 8 (left-sided inattention/neglect, dysarthria, LUE drift). He was taken for angiogram. DSA demonstrated recanalization of R M2, no intervention performed. He was admitted to [facility] for post-procedure monitoring. Etiology was felt likely cardio embolic in setting of Afib. He was restarted on his anticoagulant medications since on MRI his cortical strokes were not of significant size without any sign of hemorrhagic conversion.     On 1/12, patient was taken for endovascular thrombectomy, RM2 recanalized  spontaneously. Bilateral MCA/PCA watershed territory infarcts, likely due to hypotension in setting on syncope. On 1/13, contacted cardiology who believes the initial elevated PASP on OSH ECHO was inaccurate. They reviewed the ECHO, no significant wall motion abnormalities or right heart strain. Repeat MRI revealing progression of damage due to stroke, although clinically patient is reportedly improving. SLP assessed patient and noted he had aphasia and mild dysphagia. Patient was started on a regular diet with thin liquids.    Prior to admission, the patient was independent for self care and mobility. Upon admission to the inpatient rehab unit, the patient was setup to maximal assistance for self care and moderate to maximal assistance for mobility. Due to a decline in functional status and the need for an interdisciplinary rehab approach, the patient was admitted to [facility] acute inpatient rehabilitation unit.       HOSPITAL COURSE  The patient participated in a comprehensive inpatient rehabilitation program including physical therapy, occupational therapy and rehabilitation nursing. Please refer to UDS for discharge QIM scores    Routine admission blood cultures returned positive for Klebsiella bacteremia secondary to UTI. ID consulted and made recs:   -CTX 2 g q24h  -EOT 1/26/25  -Continue CTX inpatient, but if plans to discharge prior to completion, will step down to levofloxacin 500 mg q24h to complete course.               ROS:   CONST: weight stable.  No hypoglycemia, no polyuria, no new neurological deficits.  1 episode of rectal bleeding as above.  No rectal pain    Past medical history:  1.  Uncontrolled diabetes with neuropathy, followed by Dr. Agrawal  2.  Coronary artery disease with triple bypass March 2016, followed by the heart clinic. Now [facility], neg PET 2017, Nuc/echo neg 3/20,  3.  Back surgery 2002.  5.  Hyperlipidemia.  6.  Hypertension.  7.   colonoscopy 3 polyps 2/17 -5  polyps 7/23- 5 yrs  8.  Pneumovax and Prevnar given 2015 according to records  9.  Right leg radiculopathy, confirmed by MRI, did respond epidural with [facility] pain [facility]  10. Partial small-bowel obstruction October 2018  11.  Abnormal TSH on occasion  12. MRI (demonstrated bilateral MCA/PCA watershed infarcts), secondary to off anticoagulation/AFib after right L4/5 MIS decompression    Past Surgical History:   Procedure Laterality Date    ARTHROSCOPIC REPAIR OF ROTATOR CUFF OF SHOULDER Right 7/5/2022    Procedure: REPAIR, ROTATOR CUFF, ARTHROSCOPIC;  Surgeon: Valerie Olivera MD;  Location: [facility] OR;  Service: Orthopedics;  Laterality: Right;  VARELA AND NEPHJENNIFER ALLAN [phone]/ TEXTED ALLAN ON 6/23/2022 @ 9:47AM. ALLAN RESPONDED ON 6/23/2022 @ 10:33AM-LO  JEAN PAUL BABIN [phone] MY BE HERE FOR CASE SPOKE TO HIM @ 9:59AM ON 7-1-2022  RN PREOP 6/28/2022    BACK SURGERY      2002    CATARACT EXTRACTION W/  INTRAOCULAR LENS IMPLANT Right 08/29/2017    Dr. Hong    CATARACT EXTRACTION W/  INTRAOCULAR LENS IMPLANT Left 02/24/2022    Dr. Hong    CAUDAL EPIDURAL STEROID INJECTION N/A 9/25/2024    Procedure: Caudal Epidural Steroid Injection;  Surgeon: Eze Byrd Jr., MD;  Location: [facility] PAIN MANAGEMENT;  Service: Pain Management;  Laterality: N/A;  @1100(prev. 715)  Eliquis last 9/21  Plavix last 9/19  Check BG  E-Consent    COLONOSCOPY N/A 2/21/2017    Procedure: COLONOSCOPY;  Surgeon: Robb Rosado MD;  Location: [facility] ENDO;  Service: Endoscopy;  Laterality: N/A;    COLONOSCOPY N/A 7/5/2023    Procedure: COLONOSCOPY;  Surgeon: Aston Varela MD;  Location: [facility] ENDO;  Service: Endoscopy;  Laterality: N/A;  Referral: JOVANNI SCANLON  ok to hold Plavix 5 days and Eliquis 2 days per Dr Purdy-OFELIA   Meds  Suprep   Inst portal   LW    CORONARY ANGIOGRAPHY INCLUDING BYPASS GRAFTS WITH CATHETERIZATION OF LEFT HEART N/A 11/11/2020    Procedure: ANGIOGRAM, CORONARY, INCLUDING BYPASS  GRAFT, WITH LEFT HEART CATHETERIZATION;  Surgeon: Lane Cuellar MD;  Location: [facility] CATH LAB;  Service: Cardiology;  Laterality: N/A;  RN PRE OP 11-5-2020. --COVID NEGATIVE ON  11-. C A    CORONARY ARTERY BYPASS GRAFT      March 2016    EPIDURAL STEROID INJECTION Bilateral 11/14/2018    Procedure: Lumbar Medial Branch Blocks;  Surgeon: Eze Byrd Jr., MD;  Location: [facility] ENDO;  Service: Pain Management;  Laterality: Bilateral;  Bilateral Lumbar Medial Branch Blocks L2, L3, L4, L5    52207  11004    Arrive @ 1150; NO Sedation    EPIDURAL STEROID INJECTION Bilateral 11/28/2018    Procedure: Injection, Steroid, Epidural;  Surgeon: Eze Byrd Jr., MD;  Location: [facility] ENDO;  Service: Pain Management;  Laterality: Bilateral;  Bilateral Sacroiliac Joint Steroid Injections    27704    Arrive @ 0910    EPIDURAL STEROID INJECTION Bilateral 3/20/2019    Procedure: Injection, Steroid, Sacroiliiac Joint;  Surgeon: Eze Byrd Jr., MD;  Location: [facility] ENDO;  Service: Pain Management;  Laterality: Bilateral;  Bilateral Sacroiliac Joint Steroid Injections    06807    Arrive @ 1145; Trigger point injections also?    EPIDURAL STEROID INJECTION Right 8/2/2023    Procedure: Right L5 Transforaminal Epidural Steroid Injection;  Surgeon: Eze Byrd Jr., MD;  Location: [facility] ENDO;  Service: Pain Management;  Laterality: Right;  @0830 (given)  Eliquis last 7/29  Plavix last 7/27  Check BG    EPIDURAL STEROID INJECTION Right 10/11/2023    Procedure: Right L3 (infraneural) + S1 Transforaminal Epidural Steroid Injections;  Surgeon: Eze Byrd Jr., MD;  Location: [facility] ENDO;  Service: Pain Management;  Laterality: Right;  @0745 (requests morning)  Eliquis 10/7 & Plavix 10/5  Check BG    ESOPHAGOGASTRODUODENOSCOPY N/A 12/6/2023    Procedure: EGD (ESOPHAGOGASTRODUODENOSCOPY);  Surgeon: Anita Oswald MD;  Location: [facility];  Service: Endoscopy;  Laterality: N/A;  Procedure Timing:  1-4 weeks  Provider: Any GI provider  Location: No Preference  Additional Scheduling Information: Blood thinners  Prep Specifications:N/A   ref. by Dr. Light, instr. to portal, , [facility] meds-st  ok to hold Plavix 5 days and Eliquis 2 day    INTRAOCULAR PROSTHESES INSERTION Left 2022    Procedure: INSERTION, IOL PROSTHESIS;  Surgeon: Nickolas Hong MD;  Location: [facility] OR;  Service: Ophthalmology;  Laterality: Left;    LAMINOFORAMINOTOMY OF SPINE USING MINIMALLY INVASIVE TECHNIQUE Right 2025    Procedure: LAMINOFORAMINOTOMY, SPINE, MINIMALLY INVASIVE;  Surgeon: Luis M Ellis MD;  Location: [facility] OR;  Service: Neurosurgery;  Laterality: Right;  Right L4/5 minimally invasive laminotomy/microdiscectomy. silvana table, lucio frame, fluoro, microscope, no vendor, no monitoring  CLEARED BY CARDS AND PCP   RN PREOP 24---T/S--done -----NEED ORDERS    PHACOEMULSIFICATION OF CATARACT Left 2022    Procedure: PHACOEMULSIFICATION, CATARACT;  Surgeon: Nickolas Hong MD;  Location: [facility] OR;  Service: Ophthalmology;  Laterality: Left;  RN Phone Pre Op 22.  Covid NEGATIVE  22.  Arrival 08:00 am.    TONSILLECTOMY           Family history: Father  at 77 with stroke.  Mother  at 72 with heart problems, diabetes, hypertension.  2 sisters living in their 80s and one  at 82.  One brother  at 80 with some kidney disease and diabetes.  Sisters with diabetes, hypertension and stroke.  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  He drinks alcohol about 3 times.  Never smoked.      Vital signs as above,     Gen: no distress, speech is normal, patient will muscles move symmetrically new line gait is normal.  He can arise without difficulty.      Records and interval clinic notes as above reviewed      Assessment plan:      Diagnoses and all orders for this  visit:    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, continue with therapy, eventual driving evaluation outpatient if needed    Postlaminectomy syndrome of lumbar region, medications refilled, chronic and stable  -     gabapentin (NEURONTIN) 300 MG capsule; Take 3 capsules (900 mg total) by mouth every evening.    Lumbar radiculopathy, chronic and stable  -     gabapentin (NEURONTIN) 300 MG capsule; Take 3 capsules (900 mg total) by mouth every evening.    Type 2 diabetes mellitus with hyperglycemia, with long-term current use of insulin, chronic and stable    Essential hypertension, chronic and stable          This is a patient with chronic and complex diagnoses whose overall, ongoing care is being managed and monitored by me and our primary care clinic. As such,   is the appropriate add-on code to accompany the other E/M billing for this visit.

## 2025-07-17 RX ORDER — PANTOPRAZOLE SODIUM 40 MG/1
40 TABLET, DELAYED RELEASE ORAL
Qty: 90 TABLET | Refills: 3 | Status: SHIPPED | OUTPATIENT
Start: 2025-07-17

## 2025-07-17 RX ORDER — HYDRALAZINE HYDROCHLORIDE 50 MG/1
50 TABLET, FILM COATED ORAL 2 TIMES DAILY
Qty: 180 TABLET | Refills: 3 | Status: SHIPPED | OUTPATIENT
Start: 2025-07-17

## 2025-07-17 NOTE — TELEPHONE ENCOUNTER
Refill Decision Note   Driss Hernandez  is requesting a refill authorization.  Brief Assessment and Rationale for Refill:  Approve     Medication Therapy Plan:         Comments:     Note composed:7:15 AM 07/17/2025

## 2025-07-17 NOTE — TELEPHONE ENCOUNTER
No care due was identified.  [facility] Embedded Care Due Messages. Reference number: [id number].   7/17/2025 7:14:36 AM CDT

## 2025-07-18 ENCOUNTER — TELEPHONE (OUTPATIENT)
Dept: FAMILY MEDICINE | Facility: CLINIC | Age: 75
End: 2025-07-18
Payer: MEDICARE

## 2025-07-18 NOTE — TELEPHONE ENCOUNTER
Copied from CRM #[id number]. Topic: General Inquiry - Patient Advice  >> Jul 18, 2025  3:17 PM Ann-Marie wrote:  Type: Orders Request    What orders/ testing are being requested? Orders to continue Physical Therapy     Is there a future appointment scheduled for the patient with PCP? no    When? -    Would you prefer a response via [employer]? No [phone]      Comments: [employer]

## 2025-07-23 ENCOUNTER — TELEPHONE (OUTPATIENT)
Dept: FAMILY MEDICINE | Facility: CLINIC | Age: 75
End: 2025-07-23
Payer: MEDICARE

## 2025-07-23 DIAGNOSIS — I63.411 STROKE DUE TO EMBOLISM OF RIGHT MIDDLE CEREBRAL ARTERY: Primary | ICD-10-CM

## 2025-07-23 NOTE — TELEPHONE ENCOUNTER
Copied from CRM #[id number]. Topic: Appointments - Amb Referral  >> Jul 23, 2025  1:05 PM Med Assistant Buffy wrote:  Type:  Patient Requesting Referral    Who Called:Pt   Does the patient already have the specialty appointment scheduled?:no  If yes, what is the date of that appointment?:no  Referral to What Specialty:Speech   Reason for Referral:Strokes a few months a go   Does the patient want the referral with a specific physician?:no  [facility] the [facility]   Patient Requesting a Response?:yes   Would the patient rather a call back or a response via [facility]? Callback   Best Call Back Number:Telephone Information:  Mobile          [phone]     Additional Information: Dianne told him a request would be sent to pcp office a week or 2 ago

## 2025-07-24 ENCOUNTER — TELEPHONE (OUTPATIENT)
Dept: OPHTHALMOLOGY | Facility: CLINIC | Age: 75
End: 2025-07-24
Payer: MEDICARE

## 2025-07-24 ENCOUNTER — TELEPHONE (OUTPATIENT)
Dept: FAMILY MEDICINE | Facility: CLINIC | Age: 75
End: 2025-07-24
Payer: MEDICARE

## 2025-07-24 DIAGNOSIS — H52.7 REFRACTIVE ERROR: Primary | ICD-10-CM

## 2025-07-24 NOTE — TELEPHONE ENCOUNTER
----- Message from Veronica sent at 7/24/2025  2:19 PM CDT -----  Regarding: referral appt request  Pt has referral for:     Refractive error [[id number]]    Please call pt to schedule. Thank you

## 2025-07-24 NOTE — TELEPHONE ENCOUNTER
"Copied from CRM #[id number]. Topic: General Inquiry - Patient Advice  >> Jul 24, 2025  8:39 AM Ivelisse wrote:  .Name Of Caller:Kike / daughter      Contact Preference?:[phone]      What is the nature of the call?: in reference to pt requesting a referral to be placed to see a neuro ophthalmology Dr. ROSEANNE Bunn plsl call      Additional Notes:  "Thank you for all that you do for our patients"  "

## 2025-07-28 ENCOUNTER — CLINICAL SUPPORT (OUTPATIENT)
Dept: REHABILITATION | Facility: HOSPITAL | Age: 75
End: 2025-07-28
Attending: INTERNAL MEDICINE
Payer: MEDICARE

## 2025-07-28 DIAGNOSIS — R41.841 COGNITIVE COMMUNICATION DEFICIT: ICD-10-CM

## 2025-07-28 DIAGNOSIS — I63.411 STROKE DUE TO EMBOLISM OF RIGHT MIDDLE CEREBRAL ARTERY: Primary | ICD-10-CM

## 2025-07-28 DIAGNOSIS — I63.89 ARTERIAL ISCHEMIC STROKE, MULTIFOCAL, MULTIPLE VASCULAR TERRITORIES, ACUTE: ICD-10-CM

## 2025-07-28 PROCEDURE — 96125 COGNITIVE TEST BY HC PRO: CPT | Mod: HCNC,PN

## 2025-07-31 PROBLEM — R47.01 APHASIA: Status: RESOLVED | Noted: 2025-01-15 | Resolved: 2025-07-31

## 2025-07-31 PROBLEM — R41.841 COGNITIVE COMMUNICATION DEFICIT: Status: ACTIVE | Noted: 2025-07-31

## 2025-07-31 NOTE — PROGRESS NOTES
Outpatient Rehab    Speech-Language Pathology Evaluation    Patient Name: Driss Hernandez Jr.  MRN: [id number]  YOB: 1950  Encounter Date: 7/28/2025    Therapy Diagnosis:   Encounter Diagnoses   Name Primary?    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 on 7/28/2025.   FOTO report - see Media section or FOTO account episode details.     Intake Score (%): Not applicable for this Episode    Precautions:  Additional Precautions and Protocol Details: Standard    Subjective   History of Present Illness  Driss is a 74 y.o. male who reports to Speech-Language Pathology with a chief concern of cognitive-linguistic decline.     The patient reports a medical diagnosis of cognitive communication deficit. The patient has experienced this issue since 07/28/25.       Diagnostic tests related to this condition: Other (Comment).   Other Diagnostic Test Details: Cognitive Linguistic Quick Test (CLQT)    Patient presents in clinic breathing with Room air.             Dominant Hand: Right  Prior Level of Function: Min attention deficits and WFL in other cognitive-linguistic abilities  Current Level of  Function: Min cognitive-linguistic deficits    History of Present Condition/Illness: Pt is a 74 year old male who currently presents with cognitive deficits following CVA that occurred on 1/9/2025 and 3/5/2025. PMHx includes: HTN, HLD, Afib on Eliuquis, CAD s/p CABG, CKD3, insulin dependent diabetes, and GERD. Pt reported previous SLP services at [facility]. However, he noted that deficits continue to impact quality of life in home environment at this time. Pt reported continued word finding issues as well, but upon informal assessment expressive language noted to be WNL.    Current Speech Symptoms    Cognitive    Pain  No Pain Reported: Yes                 Treatment History  Discharged From Past 30 Days: Outpatient therapy    Previous Treatments: Speech language therapy, Cognitive training     No: Currently Receiving Treatments          [facility]    Living Arrangements  Living Situation  Housing: Home independently  Living Arrangements: Alone, Other (Comment)  Other Living Arrangements Comment: Pt noted that he and his wife are currently interacting daily but living separately. Therefore, wife able to assist with ADL/IADL task completion as needed.  Support Systems: Spouse/significant other, Children           Past Medical History/Physical Systems Review:   Driss Hernandez Jr.  has a past medical history of Aphasia, Chronic heart failure with preserved ejection fraction, Chronic midline low back pain without sciatica, Colon polyps, Coronary artery disease involving native coronary artery of native heart, Coronary artery disease involving native coronary artery of native heart with angina pectoris, CVA (cerebral vascular accident), Diabetes mellitus with neurological manifestations, uncontrolled, Diabetic polyneuropathy associated with type 2 diabetes mellitus, Diabetic polyneuropathy associated with type 2 diabetes mellitus, Encephalopathy, Essential  hypertension, Gastroesophageal reflux disease, Hyperlipidemia, Insomnia, Long-term insulin use, Longstanding persistent atrial fibrillation, Lumbar spondylosis, Nuclear sclerosis of both eyes, Obesity, PAD (peripheral artery disease), Stage 3 chronic kidney disease, and Uncontrolled type 2 diabetes mellitus without complication, with long-term current use of insulin.    Driss Hernandez Jr.  has a past surgical history that includes Colonoscopy (N/A, 2/21/2017); Coronary artery bypass graft; Back surgery; Tonsillectomy; Epidural steroid injection (Bilateral, 11/14/2018); Epidural steroid injection (Bilateral, 11/28/2018); Epidural steroid injection (Bilateral, 3/20/2019); Coronary angiography including bypass grafts with catheterization of left heart (N/A, 11/11/2020); Cataract extraction w/  intraocular lens implant (Right, 08/29/2017); Cataract extraction w/  intraocular lens implant (Left, 02/24/2022); Phacoemulsification of cataract (Left, 2/24/2022); Intraocular prosthesis insertion (Left, 2/24/2022); Arthroscopic repair of rotator cuff of shoulder (Right, 7/5/2022); Colonoscopy (N/A, 7/5/2023); Epidural steroid injection (Right, 8/2/2023); Epidural steroid injection (Right, 10/11/2023); Esophagogastroduodenoscopy (N/A, 12/6/2023); Caudal epidural steroid injection (N/A, 9/25/2024); and Laminoforaminotomy of spine using minimally invasive technique (Right, 1/6/2025).    Driss has a current medication list which includes the following prescription(s): accu-chek tish plus test strp, ascorbic acid (vitamin c), atorvastatin, blood-glucose meter, dexcom g7 sensor, clopidogrel, coenzyme q10, dapagliflozin propanediol, duloxetine, eliquis, ergocalciferol, fenofibrate, fluticasone propionate, gabapentin, hydralazine, hydrocodone-acetaminophen, isosorbide mononitrate, lancets, meclizine, metformin, nitroglycerin, omega-3 acid ethyl esters, ondansetron, pantoprazole, pen needle, diabetic, ozempic, triazolam, and  valsartan, and the following Facility-Administered Medications: cyclopentolate 1%, ofloxacin, sodium chloride 0.9%, and triamcinolone acetonide.    Review of patient's allergies indicates:   Allergen Reactions    Penicillins Other (See Comments)     Unknown reaction, Had a reaction as a child    Shrimp Itching     Hand Itching        Objective         Cognition  The patient's level of consciousness is Alert. Orientation level is Oriented x4.      Observed memory impairments include Decreased recall of precautions, Decreased short-term memory, and Decreased working memory.   Observed attention impairments include Selective attention, Sustained attention, and Divided attention.   Processing speed is Intact.      The patient demonstrates the ability to follow Multi-step commands.   Sequencing of tasks is Impaired. Sequencing impairments include Ordering tasks. The following cues are required to support task sequencing: Verbal indirect cue, Verbal direct cue, and Visual cue.      Simple problem solving is Intact.    Complex problem solving is Intact.       Error recognition awareness is Anticipatory.    Safety judgment is Impaired. Safety judgement deemed impaired d/t impulsivity. Problem solving noted to be WNL.            Cognitive-Communication  Verbal Reasoning  Impaired: Verbal Reasoning, Convergent Reasoning, and Divergent Reasoning     The following cues are required to support verbal reasoning:  Verbal indirect cue, Verbal direct cue, and Visual cue.      Social Communication  The patient's behavior and affect were observed and the patient was found to be Appropriate to situation, Calm, and Cooperative.       Joint attention was evaluated and the patient was Able to initiate and Able to respond.    Intact: Response to Humor/Figurative Language, Attempts to Repair Communication Breakdowns, Adapts Communication to Partner or Situation, Eye Contact, Prosody, Expression of Communicative Intentions, Turn Taking,  Topic Maintenance, and Perspective Taking                 Executive Function  Impaired: Planning, Organization, and Mental Flexibility     The following cues are required to support executive function: Verbal direct cue, Verbal indirect cue, and Visual cue                   Treatment       Time Entry(in minutes):  Standardized Cog Perf Testing w/ Interp Time Entry: 53    Assessment & Plan   Assessment   Driss            Diagnosis and Impressions: Pt currently presents with with mild-moderate cognitive-linguistic deficits c/b decreased sustained/selective/divided attention, short term/working memory, complex reasoning, complex sequencing, and executive functioning. These deficits limit overall safety awareness, communicative exchange, level of independence with ADL/IADL tasks, and quality of life.  Functional Limitations: Impulsivity could impact progress towards structured tasks and carryover of skills obtained throughout tx in home environment.         Evaluation/Treatment Response: Patient responded to treatment well     Patient Goal for Therapy (SLP): return to PLOF  Prognosis: Good  Prognosis Details: Prognosis is good given pt motivation, support of significant other, prior SLP services, and orientation x 4. However, noted impulsivity could negatively affect prognosis.    Assessment Details: Pt participated in Cognitive Linguistic Quick Test (CLQT) in order to determine severity of cognitive-linguistic deficits at this time. Pt scored 126/215 in the attention domain, which is indicative of mild sustained/selective/divided attention deficits. Memory domain score of 136/185 noted, indicative of mild short term/working memory deficits. Executive functioning score noted to be 11/40, indicative of moderate deficits impacting executive function skills for independence in home environment with functional IADL tasks. Pt scored 28/37 in the language domain, which indicates language skills WNL. However, reduced  convergent/divergent reasoning and attention currently impacting verbal output/thought organization. Visuospatial score of 44/105 (moderate deficits) with difficulty noted throughout structured tasks requiring sequencing, memory, and reasoning. Clock drawing score of 8/13 noted (moderate deficits) with reduced attention, memory, reasoning, and sequencing. Composite Severity Rating - 2.8 (mild-moderate impairment) noted at this time.  Plan  From a speech language pathology perspective, the patient would benefit from: Skilled Rehab Services  Planned therapy interventions and modalities include: Cognitive therapy.              Visit Frequency: 1 times Per Week for 8 Weeks.       This plan was discussed with Patient.   Discussion participants: Agreed Upon Plan of Care  Plan details: SLP services warranted 1x/week for 8 weeks in order to reduce risk of falls/injury, improve level of independence with functional ADL/IADL task completion, and improve utilization of cognitive maintenance/memory strategies in home environment. Pt verbalized understanding.        The patient's spiritual, cultural, and educational needs were considered, and the patient is agreeable to the plan of care and goals.     Goals:   Active       Long Term Goals       Pt will improve attention skills in order to reduce risk of falls/injury and increase functional level of independence with ADL/IADL tasks in home environment.       Start:  07/31/25    Expected End:  09/26/25            Pt will improve complex reasoning skills in order to reduce risk of falls/injury and increase functional level of independence with ADL/IADL tasks in home environment.        Start:  07/31/25    Expected End:  09/26/25            Pt will improve memory in order to reduce risk of falls/injury and increase functional level of independence with ADL/IADL tasks in home environment.       Start:  07/31/25    Expected End:  09/26/25            Pt will improve complex sequencing  skills in order to reduce risk of falls/injury and increase functional level of independence with ADL/IADL tasks in home environment.       Start:  07/31/25    Expected End:  09/26/25            Pt will improve executive functioning skills in order to reduce risk of falls/injury and increase functional level of independence with IADL tasks in home environment.       Start:  07/31/25    Expected End:  09/26/25            Pt will use appropriate memory strategies to schedule and recall weekly activities, perform daily tasks, express needs, and recall names to maintain safety and participate socially in functional living environment.       Start:  07/31/25    Expected End:  09/26/25               Short Term Goals       Pt will complete divided attention task with 90% accuracy independently for two consecutive sessions in order to reduce risk of falls/injury and improve functional level of independence with ADL/IADL tasks.       Start:  07/31/25    Expected End:  08/29/25            Pt will recall functional information after a 5 minute delay with 80% accuracy requiring min visual/verbal cues for two consecutive sessions in order to improve short term memory with functional ADL/IADL tasks.       Start:  07/31/25    Expected End:  08/29/25            Pt will complete time/money/medication management tasks with 70% accuracy requiring min visual/verbal cues for two consecutive sessions in order to improve executive functioning for independence with functional IADL tasks in home environment.       Start:  07/31/25    Expected End:  08/29/25            Pt will complete deductive reasoning task with 90% accuracy requiring min visual/verbal cues for two consecutive sessions in order to improve functional level of independence with ADL/IADL tasks in home environment.       Start:  07/31/25    Expected End:  08/29/25            Pt will complete complex sequencing task with 90% accuracy requiring min visual/verbal cues for two  consecutive sessions in order to improve functional level of independence with ADL/IADL tasks in home environment.       Start:  07/31/25    Expected End:  08/29/25            Pt will recall and utilize 3/4 memory strategies independently in order to promote utilization of strategies in home environment.        Start:  07/31/25    Expected End:  08/29/25                Natasha Covarrubias CCC-SLP

## 2025-08-01 ENCOUNTER — TELEPHONE (OUTPATIENT)
Dept: FAMILY MEDICINE | Facility: CLINIC | Age: 75
End: 2025-08-01
Payer: MEDICARE

## 2025-08-01 DIAGNOSIS — R20.0 SENSORY LOSS: Primary | ICD-10-CM

## 2025-08-01 DIAGNOSIS — E78.2 MIXED HYPERLIPIDEMIA: ICD-10-CM

## 2025-08-01 DIAGNOSIS — R29.3 POSTURE IMBALANCE: ICD-10-CM

## 2025-08-01 RX ORDER — OMEGA-3-ACID ETHYL ESTERS 1 G/1
CAPSULE, LIQUID FILLED ORAL
Qty: 360 CAPSULE | Refills: 3 | Status: SHIPPED | OUTPATIENT
Start: 2025-08-01

## 2025-08-01 NOTE — TELEPHONE ENCOUNTER
----- Message from Natasha Covarrubias sent at 8/1/2025  8:20 AM CDT -----  Regarding: RE: OT Orders  Yes, I would need you to put in those orders. It would be OT for vestibular therapy.    Have a good weekend!    - Natasha  ----- Message -----  From: Jono Willoughby MD  Sent: 7/31/2025   5:08 PM CDT  To: Natasha Covarrubias CCC-SLP  Subject: RE: OT Orders                                    Thanks for the update.  Presumably you need me to put in the OT orders?  As you probably know better than I do, I guess it is OT that does the vestibular therapy?  Not PT?  With the epic now the order has to be specific  ----- Message -----  From: Natasha Covarrubias CCC-SLP  Sent: 7/31/2025  12:38 PM CDT  To: Jono Willoughby MD  Subject: OT Orders                                        Good afternoon!     My name is Natasha, and I'm the SLP at [facility]. I just evaluated your pt, TARI Hernandez, today and wanted to request OT orders d/t persisting vestibular/balance deficits per pt. Please let me know if you have any questions or concerns.     Thank you!     - MIHIR Hernandez, CCC-SLP

## 2025-08-01 NOTE — TELEPHONE ENCOUNTER
Care Due:                  Date            Visit Type   Department     Provider  --------------------------------------------------------------------------------                                [facility] MED                              PRIMARY      / INTERNAL MED Jono Samuel  Last Visit: 06-      CARE ([facility])   / PEDS         [place]  Next Visit: None Scheduled  None         None Found                                                            Last  Test          Frequency    Reason                     Performed    Due Date  --------------------------------------------------------------------------------    HBA1C.......  6 months...  dapagliflozin, metFORMIN,   01- 07-                             semaglutide..............    Vitamin D...  12 months..  ergocalciferol...........  07- 07-    [employer] Embedded Care Due Messages. Reference number: [id number].   8/01/2025 1:11:17 PM CDT

## 2025-08-04 ENCOUNTER — CLINICAL SUPPORT (OUTPATIENT)
Dept: REHABILITATION | Facility: HOSPITAL | Age: 75
End: 2025-08-04
Payer: MEDICARE

## 2025-08-04 DIAGNOSIS — I63.89 ARTERIAL ISCHEMIC STROKE, MULTIFOCAL, MULTIPLE VASCULAR TERRITORIES, ACUTE: ICD-10-CM

## 2025-08-04 DIAGNOSIS — I63.411 STROKE DUE TO EMBOLISM OF RIGHT MIDDLE CEREBRAL ARTERY: Primary | ICD-10-CM

## 2025-08-04 DIAGNOSIS — R41.841 COGNITIVE COMMUNICATION DEFICIT: ICD-10-CM

## 2025-08-04 PROCEDURE — 97130 THER IVNTJ EA ADDL 15 MIN: CPT | Mod: HCNC,PN

## 2025-08-04 PROCEDURE — 97129 THER IVNTJ 1ST 15 MIN: CPT | Mod: HCNC,PN

## 2025-08-04 NOTE — PROGRESS NOTES
Outpatient Rehab    Speech-Language Pathology Visit    Patient Name: Driss Hernandez Jr.  MRN: [id number]  YOB: 1950  Encounter Date: 8/4/2025    Therapy Diagnosis:   Encounter Diagnoses   Name Primary?    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 Yes    Cognitive communication deficit     Arterial ischemic stroke, multifocal, multiple vascular territories, acute      Physician: Jono Willoughby MD    Physician Orders: Eval and Treat  Medical Diagnosis:   Surgical Diagnosis: Not applicable for this Episode   Surgical Date: Not applicable for this Episode  Days Since Last Surgery: Not applicable for this Episode    Visit # / Visits Authorized: 1 / 20   Insurance Authorization Period: 8/4/2025 to 12/31/2025  Date of Evaluation: 7/28/2025   Plan of Care Certification:       Time In: 1100   Time Out: 1145  Total Time (in minutes): 45   Total Billable Time (in minutes): 45    FOTO:  Intake Score (%): Not applicable for this Episode  Survey Score 2 (%): Not applicable for this Episode  Survey Score 3 (%): Not applicable for this Episode    Precautions:  Additional Precautions and Protocol Details: Standard    Subjective   Pt arrived on time for SLP visit alert and oriented x 4 participating well in both structured/non-structured complex cognitive-linguistic tasks..  Pain reported as 0/10. pt reported no pain at this time, however noted that neuropathy pain levels fluctuate throughout day.      Objective            Treatment  Cognitive Skills  Activity 1: Divided attention targeted via written directions task in which pt followed multi-step concrete and abstract directions instructing to draw specific markings on certain category members in a list of words with 50% accuracy requiring mod visual/verbal cues. Perseveration noted throughout of previous stimuli.  Activity 2: Pt recalled functional details from a photograph after a 5 minute delay with 90% accuracy independently.  Activity 3:  Executive functioning targeted via time management word problem task in which pt verbally solved word problems with 80% accuracy requiring min visual/verbal cues.  Activity 4: Pt sequenced 4 scrambled words in order to form an appropriate sentence via scrambled sentences task with 100% accuracy independently.    Time Entry(in minutes):  Cognitive Skill Development Time Entry: 15  Cognitive Skill Development (Medicare) Time Entry: 30    Assessment & Plan   Assessment  Pt currently presents with with mild-moderate cognitive-linguistic deficits c/b decreased sustained/selective/divided attention, short term/working memory, complex reasoning, complex sequencing, and executive functioning. These deficits limit overall safety awareness, communicative exchange, level of independence with ADL/IADL tasks, and quality of life. Steady gains noted in sequencing, short term memory, and executive functioning. Pt approaching meeting STG's for executive functioning and short term memory at this time. However, reduced divided attention continues to be noted.  Evaluation/Treatment Tolerance: Patient tolerated treatment well    The patient will continue to benefit from skilled outpatient speech therapy in order to address the deficits listed in the problem list on the initial evaluation, provide patient and family education, and maximize the patients level of independence in the home and community environments.     The patient's spiritual, cultural, and educational needs were considered, and the patient is agreeable to the plan of care and goals.     Education  Education was done with Patient. The patient's learning style includes Demonstration, Listening, Pictures/video, and Reading. The patient Demonstrates understanding and Verbalizes understanding.         SLP educated on utilization of cognitive maintenance strategies in home environment in order to promote carryover of skills obtained throughout tx. Pt verbalized  understanding.        Plan  Continue POC in order to reduce risk of falls/injury, improve level of independence with functional ADL/IADL task completion, and improve utilization of cognitive maintenance/memory strategies in home environment.  Diet Recommendation: Oral diet       Goals:   Active       Long Term Goals       Pt will improve attention skills in order to reduce risk of falls/injury and increase functional level of independence with ADL/IADL tasks in home environment. (Progressing)       Start:  07/31/25    Expected End:  09/26/25            Pt will improve complex reasoning skills in order to reduce risk of falls/injury and increase functional level of independence with ADL/IADL tasks in home environment.  (Progressing)       Start:  07/31/25    Expected End:  09/26/25            Pt will improve memory in order to reduce risk of falls/injury and increase functional level of independence with ADL/IADL tasks in home environment. (Progressing)       Start:  07/31/25    Expected End:  09/26/25            Pt will improve complex sequencing skills in order to reduce risk of falls/injury and increase functional level of independence with ADL/IADL tasks in home environment. (Progressing)       Start:  07/31/25    Expected End:  09/26/25            Pt will improve executive functioning skills in order to reduce risk of falls/injury and increase functional level of independence with IADL tasks in home environment. (Progressing)       Start:  07/31/25    Expected End:  09/26/25            Pt will use appropriate memory strategies to schedule and recall weekly activities, perform daily tasks, express needs, and recall names to maintain safety and participate socially in functional living environment. (Progressing)       Start:  07/31/25    Expected End:  09/26/25               Short Term Goals       Pt will complete divided attention task with 90% accuracy independently for two consecutive sessions in order to  reduce risk of falls/injury and improve functional level of independence with ADL/IADL tasks. (Progressing)       Start:  07/31/25    Expected End:  08/29/25            Pt will recall functional information after a 5 minute delay with 80% accuracy requiring min visual/verbal cues for two consecutive sessions in order to improve short term memory with functional ADL/IADL tasks. (Progressing)       Start:  07/31/25    Expected End:  08/29/25            Pt will complete time/money/medication management tasks with 70% accuracy requiring min visual/verbal cues for two consecutive sessions in order to improve executive functioning for independence with functional IADL tasks in home environment. (Progressing)       Start:  07/31/25    Expected End:  08/29/25            Pt will complete deductive reasoning task with 90% accuracy requiring min visual/verbal cues for two consecutive sessions in order to improve functional level of independence with ADL/IADL tasks in home environment. (Progressing)       Start:  07/31/25    Expected End:  08/29/25            Pt will complete complex sequencing task with 90% accuracy requiring min visual/verbal cues for two consecutive sessions in order to improve functional level of independence with ADL/IADL tasks in home environment. (Progressing)       Start:  07/31/25    Expected End:  08/29/25            Pt will recall and utilize 3/4 memory strategies independently in order to promote utilization of strategies in home environment.  (Progressing)       Start:  07/31/25    Expected End:  08/29/25                Natasha Covarrubias, GAIL-SLP

## 2025-08-11 ENCOUNTER — TELEPHONE (OUTPATIENT)
Dept: CARDIOLOGY | Facility: CLINIC | Age: 75
End: 2025-08-11
Payer: MEDICARE

## 2025-08-11 ENCOUNTER — CLINICAL SUPPORT (OUTPATIENT)
Dept: REHABILITATION | Facility: HOSPITAL | Age: 75
End: 2025-08-11
Payer: MEDICARE

## 2025-08-11 DIAGNOSIS — I63.89 ARTERIAL ISCHEMIC STROKE, MULTIFOCAL, MULTIPLE VASCULAR TERRITORIES, ACUTE: ICD-10-CM

## 2025-08-11 DIAGNOSIS — R41.841 COGNITIVE COMMUNICATION DEFICIT: ICD-10-CM

## 2025-08-11 DIAGNOSIS — I63.411 STROKE DUE TO EMBOLISM OF RIGHT MIDDLE CEREBRAL ARTERY: Primary | ICD-10-CM

## 2025-08-11 PROCEDURE — 97129 THER IVNTJ 1ST 15 MIN: CPT | Mod: HCNC,PN

## 2025-08-11 PROCEDURE — 97130 THER IVNTJ EA ADDL 15 MIN: CPT | Mod: HCNC,PN

## 2025-08-12 DIAGNOSIS — M96.1 POSTLAMINECTOMY SYNDROME OF LUMBAR REGION: ICD-10-CM

## 2025-08-12 DIAGNOSIS — E55.9 HYPOVITAMINOSIS D: ICD-10-CM

## 2025-08-12 DIAGNOSIS — M54.16 LUMBAR RADICULOPATHY: ICD-10-CM

## 2025-08-12 DIAGNOSIS — E78.49 OTHER HYPERLIPIDEMIA: ICD-10-CM

## 2025-08-12 RX ORDER — FENOFIBRATE 160 MG/1
160 TABLET ORAL EVERY MORNING
Qty: 90 TABLET | Refills: 1 | Status: SHIPPED | OUTPATIENT
Start: 2025-08-12

## 2025-08-13 RX ORDER — ERGOCALCIFEROL 1.25 MG/1
50000 CAPSULE ORAL
Qty: 12 CAPSULE | Refills: 12 | Status: SHIPPED | OUTPATIENT
Start: 2025-08-13

## 2025-08-13 RX ORDER — GABAPENTIN 300 MG/1
900 CAPSULE ORAL NIGHTLY
Qty: 270 CAPSULE | Refills: 5 | OUTPATIENT
Start: 2025-08-13

## 2025-08-18 ENCOUNTER — CLINICAL SUPPORT (OUTPATIENT)
Dept: REHABILITATION | Facility: HOSPITAL | Age: 75
End: 2025-08-18
Payer: MEDICARE

## 2025-08-18 DIAGNOSIS — R41.841 COGNITIVE COMMUNICATION DEFICIT: ICD-10-CM

## 2025-08-18 DIAGNOSIS — I63.411 STROKE DUE TO EMBOLISM OF RIGHT MIDDLE CEREBRAL ARTERY: Primary | ICD-10-CM

## 2025-08-18 DIAGNOSIS — I63.89 ARTERIAL ISCHEMIC STROKE, MULTIFOCAL, MULTIPLE VASCULAR TERRITORIES, ACUTE: ICD-10-CM

## 2025-08-18 PROCEDURE — 97130 THER IVNTJ EA ADDL 15 MIN: CPT | Mod: HCNC,PN

## 2025-08-18 PROCEDURE — 97129 THER IVNTJ 1ST 15 MIN: CPT | Mod: HCNC,PN

## 2025-08-19 ENCOUNTER — TELEPHONE (OUTPATIENT)
Dept: CARDIOLOGY | Facility: CLINIC | Age: 75
End: 2025-08-19
Payer: MEDICARE

## 2025-08-25 ENCOUNTER — CLINICAL SUPPORT (OUTPATIENT)
Dept: REHABILITATION | Facility: HOSPITAL | Age: 75
End: 2025-08-25
Payer: MEDICARE

## 2025-08-25 DIAGNOSIS — Z74.09 IMPAIRED MOBILITY AND ADLS: Primary | ICD-10-CM

## 2025-08-25 DIAGNOSIS — R41.841 COGNITIVE COMMUNICATION DEFICIT: ICD-10-CM

## 2025-08-25 DIAGNOSIS — Z78.9 IMPAIRED MOBILITY AND ADLS: Primary | ICD-10-CM

## 2025-08-25 DIAGNOSIS — I63.411 STROKE DUE TO EMBOLISM OF RIGHT MIDDLE CEREBRAL ARTERY: Primary | ICD-10-CM

## 2025-08-25 DIAGNOSIS — I63.89 ARTERIAL ISCHEMIC STROKE, MULTIFOCAL, MULTIPLE VASCULAR TERRITORIES, ACUTE: ICD-10-CM

## 2025-08-25 PROCEDURE — 97130 THER IVNTJ EA ADDL 15 MIN: CPT | Mod: HCNC,PN

## 2025-08-25 PROCEDURE — 97165 OT EVAL LOW COMPLEX 30 MIN: CPT | Mod: HCNC,PN

## 2025-08-25 PROCEDURE — 97129 THER IVNTJ 1ST 15 MIN: CPT | Mod: HCNC,PN

## 2025-09-01 PROBLEM — Z78.9 IMPAIRED MOBILITY AND ADLS: Status: RESOLVED | Noted: 2025-08-25 | Resolved: 2025-09-01

## 2025-09-01 PROBLEM — Z74.09 IMPAIRED MOBILITY AND ADLS: Status: RESOLVED | Noted: 2025-08-25 | Resolved: 2025-09-01

## 2025-09-04 ENCOUNTER — CLINICAL SUPPORT (OUTPATIENT)
Dept: REHABILITATION | Facility: HOSPITAL | Age: 75
End: 2025-09-04
Payer: MEDICARE

## 2025-09-04 DIAGNOSIS — R41.841 COGNITIVE COMMUNICATION DEFICIT: ICD-10-CM

## 2025-09-04 DIAGNOSIS — I63.89 ARTERIAL ISCHEMIC STROKE, MULTIFOCAL, MULTIPLE VASCULAR TERRITORIES, ACUTE: ICD-10-CM

## 2025-09-04 DIAGNOSIS — R29.818 FINE MOTOR IMPAIRMENT: Primary | ICD-10-CM

## 2025-09-04 DIAGNOSIS — R29.898 FINE MOTOR IMPAIRMENT: Primary | ICD-10-CM

## 2025-09-04 DIAGNOSIS — G93.40 ENCEPHALOPATHY, UNSPECIFIED TYPE: ICD-10-CM

## 2025-09-04 DIAGNOSIS — I63.411 STROKE DUE TO EMBOLISM OF RIGHT MIDDLE CEREBRAL ARTERY: Primary | ICD-10-CM

## 2025-09-04 PROCEDURE — 97130 THER IVNTJ EA ADDL 15 MIN: CPT | Mod: HCNC,PN

## 2025-09-04 PROCEDURE — 97129 THER IVNTJ 1ST 15 MIN: CPT | Mod: HCNC,PN

## 2025-09-04 PROCEDURE — 97530 THERAPEUTIC ACTIVITIES: CPT | Mod: HCNC,PN

## 2025-09-04 PROCEDURE — 97110 THERAPEUTIC EXERCISES: CPT | Mod: HCNC,PN

## (undated) DEVICE — NDL SAFETY 22G X 1.5 ECLIPSE

## (undated) DEVICE — CATH 6FR IMA

## (undated) DEVICE — KIT MANIFOLD LOW PRESS TUBING

## (undated) DEVICE — PACK CATH LAB

## (undated) DEVICE — KIT GREY EYE

## (undated) DEVICE — WIRE GUIDE SAFE-T-J .035 260CM

## (undated) DEVICE — KIT SYR REUSABLE

## (undated) DEVICE — DRAPE OPMI STERILE

## (undated) DEVICE — BNDG COFLEX FOAM LF2 ST 4X5YD

## (undated) DEVICE — SUT D SPECIAL VICRYL 2-0

## (undated) DEVICE — MAT SUCTION PUDDLEVAC ORANGE

## (undated) DEVICE — KNIFE ANGLE 1MM

## (undated) DEVICE — HEMOSTAT VASC BAND LONG 27CM

## (undated) DEVICE — SUT MCRYL PLUS 4-0 PS2 27IN

## (undated) DEVICE — HYDRODISSECTOR NUCLEUS 27GX7/8

## (undated) DEVICE — SHEILD & GARTERS FOX METAL EYE

## (undated) DEVICE — PAD SHOULDER CARE POLAR

## (undated) DEVICE — SYR 3CC LUER LOC

## (undated) DEVICE — GUIDEWIRE SAFE T J TIP 145X2.5

## (undated) DEVICE — ADHESIVE DERMABOND MINI HV

## (undated) DEVICE — STOCKINET 4INX48

## (undated) DEVICE — NDL 18GA X1 1/2 REG BEVEL

## (undated) DEVICE — DRESSING XEROFORM FOIL PK 1X8

## (undated) DEVICE — KIT SPINAL PATIENT CARE JACK

## (undated) DEVICE — GOWN NONREINF SET-IN SLV 2XL

## (undated) DEVICE — COVER MAYO STAND 23X54IN

## (undated) DEVICE — MAT QUICK 40X30 FLOOR FLUID LF

## (undated) DEVICE — PAD ABD TNDRSRB 7.5X8 STRL

## (undated) DEVICE — NDL SPINAL 18GX3.5 SPINOCAN

## (undated) DEVICE — GLOVE SIGNATURE ESSNTL LTX 7.5

## (undated) DEVICE — APPLICATOR CHLORAPREP ORN 26ML

## (undated) DEVICE — DRAPE ABDOMINAL TIBURON 14X11

## (undated) DEVICE — MARKER SKIN RULER STERILE

## (undated) DEVICE — GLOVE BIOGEL ECLIPSE SZ 7.5

## (undated) DEVICE — CARTRIDGE LENS D

## (undated) DEVICE — ELECTRODE REM PLYHSV RETURN 9

## (undated) DEVICE — KIT HAND CONTROL HIGH PRESSUR

## (undated) DEVICE — SEE MEDLINE ITEM 157110

## (undated) DEVICE — CATH DXTERITY JL40 100CM 6FR

## (undated) DEVICE — PAD ABD 8X10 STERILE

## (undated) DEVICE — OMNIPAQUE 350MG 150ML VIAL

## (undated) DEVICE — KIT TRACTION SHLDR ST DISP LF

## (undated) DEVICE — PAD DEFIB CADENCE ADULT R2

## (undated) DEVICE — SOL WATER STRL IRR 1000ML

## (undated) DEVICE — GLOVE SURGICAL LATEX SZ 7

## (undated) DEVICE — GAUZE SPONGE 4X4 12PLY

## (undated) DEVICE — GUIDE TENDON STABILIZATION

## (undated) DEVICE — Device

## (undated) DEVICE — DURAPREP SURG SCRUB 26ML

## (undated) DEVICE — SEE MEDLINE ITEM 157166

## (undated) DEVICE — HANDPIECE TRANSFORMER I/A

## (undated) DEVICE — SUT 0 36IN PDS II VIO MONO

## (undated) DEVICE — SUT 3/0 36IN COATED VICRYL

## (undated) DEVICE — GOWN SURGICAL X-LARGE

## (undated) DEVICE — CATH OPTITORQUE TIGER 5F 100CM

## (undated) DEVICE — PROBE ARTHO ENERGY 90 DEG

## (undated) DEVICE — SOLUTION BSS PLUS

## (undated) DEVICE — KIT GLIDESHEATH SLEND 6FR 10CM

## (undated) DEVICE — SOL BETADINE 5%

## (undated) DEVICE — SLEEVE ULTRA INFUSION

## (undated) DEVICE — TOWEL OR DISP STRL BLUE 4/PK

## (undated) DEVICE — BLANKET LOWER BODY 55.9X40.2IN

## (undated) DEVICE — COVER SNAP 36IN X 30IN

## (undated) DEVICE — DRAPE STERI 32 X 50

## (undated) DEVICE — BUR BONE CUT MICRO TPS 3X3.8MM

## (undated) DEVICE — DRAPE PLASTIC U 60X72

## (undated) DEVICE — GLOVE BIOGEL PI MICRO SZ 6.5

## (undated) DEVICE — SYR 10CC LUER LOCK

## (undated) DEVICE — DRESSING GAUZE XEROFORM 5X9

## (undated) DEVICE — VALVE CONTROL COPILOT

## (undated) DEVICE — NDL HYPO REG 25G X 1 1/2

## (undated) DEVICE — TUBE SET INFLOW/OUTFLOW

## (undated) DEVICE — CATH DXTERITY LCB 100CM 6FR

## (undated) DEVICE — SPONGE COTTON TRAY 4X4IN

## (undated) DEVICE — GUIDE LAUNCHER 6FR EBU 3.0

## (undated) DEVICE — BLADE ELECTRO EDGE INSULATED

## (undated) DEVICE — SUT ETHILON 3-0 PS2 18 BLK

## (undated) DEVICE — GUIDEWIRE PROWATER .014X180CM

## (undated) DEVICE — CANISTER SUCTION 2 LTR

## (undated) DEVICE — BANDAGE ADHESIVE

## (undated) DEVICE — KIT CUSTOM BASIC EYE ST / MEA

## (undated) DEVICE — PACK ARTHROSCOPY W/ISO BAC

## (undated) DEVICE — GLOVE SENSICARE PI GRN 8

## (undated) DEVICE — SOL IRR STRL WATER 500ML

## (undated) DEVICE — TRAY NEURO OMC

## (undated) DEVICE — PACK ENDOSCOPY GENERAL

## (undated) DEVICE — TIP PHACO 45 DEGREE KELMAN

## (undated) DEVICE — DRAPE STERI INSTRUMENT 1018

## (undated) DEVICE — TUBE FRAZIER 5MM 2FT SOFT TIP

## (undated) DEVICE — CATH DXTERITY NOTO 100CM 6FR

## (undated) DEVICE — INTRODUCER CATH 6F 11CM

## (undated) DEVICE — SYR ONLY LUER LOCK 20CC

## (undated) DEVICE — PEROXIDE HYDROGEN 3% 16OZ

## (undated) DEVICE — UNDERGLOVE BIOGEL PI SZ 6.5 LF

## (undated) DEVICE — KIT EYE PIC PACK WB

## (undated) DEVICE — UNDERGLOVES BIOGEL PI SZ 7 LF

## (undated) DEVICE — SHIELD FOX W/GARTER

## (undated) DEVICE — CATH DXTERITY PIGSTR 110CM 6FR

## (undated) DEVICE — SEE MEDLINE ITEM 157165

## (undated) DEVICE — PENCIL BAYONET BOVIE

## (undated) DEVICE — STRIP MG FML-GLO .06 - ORDER F

## (undated) DEVICE — SOL IRR NACL .9% 3000ML

## (undated) DEVICE — DRAPE INCISE IOBAN 2 23X17IN

## (undated) DEVICE — GUIDE LAUNCHER 6FR EBU 3.5

## (undated) DEVICE — CATH EMERGE MR 12 X 2.00

## (undated) DEVICE — NDL SPINAL 20GX3.5 HUB

## (undated) DEVICE — DRESSING AQUACEL FOAM 5 X 5

## (undated) DEVICE — SLING ARM ULTRA III PAD LG

## (undated) DEVICE — HEMOSTAT SURGICEL 4X8IN

## (undated) DEVICE — BLADE SURG BVL ANG COAX 2.4MM

## (undated) DEVICE — BLADE SURG CARBON STEEL SZ11